# Patient Record
Sex: FEMALE | Race: WHITE
[De-identification: names, ages, dates, MRNs, and addresses within clinical notes are randomized per-mention and may not be internally consistent; named-entity substitution may affect disease eponyms.]

---

## 2021-12-31 ENCOUNTER — HOSPITAL ENCOUNTER (EMERGENCY)
Dept: HOSPITAL 97 - ER | Age: 66
Discharge: LEFT BEFORE DISCHARGE | End: 2021-12-31
Payer: COMMERCIAL

## 2021-12-31 VITALS — DIASTOLIC BLOOD PRESSURE: 58 MMHG | SYSTOLIC BLOOD PRESSURE: 130 MMHG | TEMPERATURE: 98.4 F | OXYGEN SATURATION: 100 %

## 2021-12-31 DIAGNOSIS — Z53.29: ICD-10-CM

## 2021-12-31 DIAGNOSIS — Z20.822: ICD-10-CM

## 2021-12-31 DIAGNOSIS — R11.2: Primary | ICD-10-CM

## 2021-12-31 PROCEDURE — 99282 EMERGENCY DEPT VISIT SF MDM: CPT

## 2021-12-31 NOTE — ER
Nurse's Notes                                                                                     

 [facility]                                                                 

Name: Juli Rushing                                                                             

Age: 66 yrs                                                                                       

Sex: Female                                                                                       

: 1955                                                                                   

MRN: [id number]                                                                                   

Arrival Date: 2021                                                                          

Time: 16:33                                                                                       

Account#: [id number]                                                                            

Bed Waiting                                                                                       

Private MD:                                                                                       

Diagnosis:                                                                                        

                                                                                                  

Presentation:                                                                                     

                                                                                             

16:42 Chief complaint: Patient states: "I want to tested for Covid". Pt reports vomiting for  eo2 

      days, denies vomiting today, reports ongoing nausea, "headache and sinus drainage". Pt      

      gets dialysis T,TH, Sat, reports unvaccinated for COVID. Noted with O2 NC from home.        

      Coronavirus screen: Vaccine status: Patient reports being unvaccinated. Ebola Screen:       

      Patient negative for fever greater than or equal to 101.5 degrees Fahrenheit, and           

      additional compatible Ebola Virus Disease symptoms Patient denies travel to an              

      Ebola-affected area in the 21 days before illness onset. (+) Exposure. Initial Sepsis       

      Screen: Does the patient meet any 2 criteria? No. Patient's initial sepsis screen is        

      negative. Does the patient have a suspected source of infection? No. Patient's initial      

      sepsis screen is negative. Risk Assessment: Do you want to hurt yourself or someone         

      else? Patient reports no desire to harm self or others. Onset of symptoms is unknown.       

16:42 Method Of Arrival: Ambulatory                                                           eo2 

16:42 Acuity: MIRTHA 4                                                                           eo2 

17:01 Note Pt seen by Raymond GARCIA in triage, made aware that COVID results can take a few      eo2 

      days, and that her symptoms cannot be attributed to COVID alone given her health            

      history. Pt advised to wait for further workup in the ER, pt states she does not desire     

      to wait for a long time in the ER, states she wants the COVID swab done and will leave      

      after. Raymond NP aware.                                                                    

                                                                                                  

Triage Assessment:                                                                                

16:49 General: Appears in no apparent distress. Behavior is cooperative.                      eo2 

                                                                                                  

Historical:                                                                                       

- Allergies:                                                                                      

16:47 PENICILLINS;                                                                            eo2 

- PMHx:                                                                                           

16:47 stage 4 kidney failure; dialysis; Hypertensive disorder; high cholesterol;              eo2 

                                                                                                  

- Immunization history:: Adult Immunizations not up to date, Client reports having NOT            

  received the Covid vaccine. Flu vaccine is not up to date.                                      

- Social history:: Smoking status: Patient denies any tobacco usage or history of.                

  Patient/guardian denies using alcohol, street drugs.                                            

                                                                                                  

                                                                                                  

Vital Signs:                                                                                      

16:42  / 58; Pulse 73; Resp 19; Temp 98.4(O); Pulse Ox 100% on 2 lpm NC; Weight 104.33  eo2 

      kg; Height 5 ft. 7 in. (170.18 cm); Pain 9/10;                                              

16:42 Body Mass Index 36.02 (104.33 kg, 170.18 cm)                                            eo2 

16:42 pt reports lower back pain, states I don't know if I have a urinary tract infection     eo2 

                                                                                                  

ED Course:                                                                                        

16:33 Patient arrived in ED.                                                                  mr  

16:44 [facility]                                                        kb  

16:44 Scotty Leavitt MD is Attending Physician.                                                kb  

16:47 Triage completed.                                                                       eo2 

17:01 COVID-19 (Coronavirus) Document "Date of Onset" if Symptomatic Sent.                    eo2 

                                                                                                  

Administered Medications:                                                                         

No medications were administered                                                                  

                                                                                                  

                                                                                                  

Outcome:                                                                                          

17:04 Patient left the ED.                                                                    eo2 

                                                                                                  

Signatures:                                                                                       

Maribel Andrade FNP-C FNP-Ckb Rivera, Mary                                                                                    

Anila Riley, RN                      RN   eo2                                                  

                                                                                                  

**************************************************************************************************

## 2021-12-31 NOTE — XMS REPORT
Continuity of Care Document

                          Created on:2021



Patient:SUZI SEARS

Sex:Female

:1955

External Reference #:[id number]





Demographics







                          Address                   [address] [place] [address] [address]



                                                    [place] [address]

 

                          Home Phone                [phone]

 

                          Work Phone                [phone]

 

                          Email Address             [email or web address]

 

                          Preferred Language        English

 

                          Marital Status            Unknown

 

                          Latter day Affiliation     Unknown

 

                          Race                      Unknown

 

                          Ethnic Group              Unknown









Author







                          Organization              [facility]

t

 

                          Address                   [address]



                                                    [place] [address]

 

                          Phone                     [phone]









Support







                Name            Relationship    Address         Phone

 

                EDUARDO SEARS      SP              [address] [phone]



                                                [place] [address] 

 

                EDUARDO SEARS      FAUSTO              Unavailable     [phone]









Care Team Providers







                    Name                Role                Phone

 

                    DRUMED               Attending Clinician Unavailable

 

                    ANABEL              Attending Clinician Unavailable

 

                    TONO           Attending Clinician Unavailable

 

                    MD ALIYA LAMAR Attending Clinician Unavailable

 

                    LAKHWINDER           Attending Clinician Unavailable

 

                    RADHA              Attending Clinician Unavailable

 

                    ANAIS          Attending Clinician Unavailable

 

                    MD ANAIS      Attending Clinician Unavailable

 

                    MD ANABEL  OBIOMA  Attending Clinician Unavailable

 

                    MD LATISHA POWER Attending Clinician Unavailable

 

                    DANIELLA               Attending Clinician Unavailable

 

                    CHAD              Attending Clinician Unavailable

 

                    DR KEIKO            Attending Clinician Unavailable

 

                    TONO           Admitting Clinician Unavailable

 

                    MD ALIYA LAMAR Admitting Clinician Unavailable

 

                    ANAIS          Admitting Clinician Unavailable

 

                    MD ANAIS      Admitting Clinician Unavailable

 

                    ANABEL              Admitting Clinician Unavailable

 

                    MD JOSEY BECKHAM        Admitting Clinician Unavailable

 

                    MD LATISHA POWER Admitting Clinician Unavailable

 

                    DR KEIKO            Admitting Clinician Unavailable









Problems







       Condition Condition Condition Status Onset  Resolution Last   Treating Co

mments 

Source



       Name   Details Category        Date   Date   Treatment Clinician        



                                                 Date                 

 

       SOLO           Diagnosis Active 2021               Mem

[facility]



                                             12:03:00               l



                SOLO                00:00:                             Barron



                                   00                                 



                                                                      



              Active                                                  



                                                                      



                                                                      



              2021                                                  



                                                                      



                                                                      



                                                                      



                                                                      



                                                                      



                                                                      



                                                                      



                                                                      



                                                                       



              [facility]                                                  



                                                                      



                                                                      

 

       COLON         Diagnosis Active 2017               [facility]

[facility]



       CANCER                                05:49:00               l



       SCREENING-   COLON               00:00:                             [facility]

nn



       Z12.11 CANCER               00                                 



              SCREENING-                                                  



              Z12.11                                                  



                                                                      



                                                                      



              Active                                                  



                                                                      



                                                                      



              2017                                                  



                                                                      



                                                                      



                                                                      



                                                                      



                                                                      



                                                                      



                                                                      



                                                                      



                     [facility]                                                  



              [facility]                                                  



                                                                      



                                                                      

 

       R92.1 -        Diagnosis Active 2016               Me

moria



       MAMMOGRAPH                                15:55:00               l



       IC       R92.1 -               00:01:                             Barron



       CALCIFCN MAMMOGRAPH               00                                 



       FOUND ON IC                                                      



              CALCIFCN                                                  



              FOUND ON                                                  



                                                                      



                                                                      



              Active                                                  



                                                                      



                                                                      



              2016                                                  



                                                                      



                                                                      



                                                                      



                                                                      



                                                                      



                                                                      



                                                                      



                                                                      



                                                                       



              [employer] [facility]                                                  



              [facility]                                                    



                                                                      



                                                                      

 

       Z12.31 -        Diagnosis Active 2016               M

em[facility]



       ENCNTR                      -          07:08:00               l



       SCREEN   Z12.31 -               00:01:                             Donny martinez



       MAMMOGRAM ENCNTR               00                                 



       FOR MA SCREEN                                                  



              MAMMOGRAM                                                  



              FOR MA                                                  



                                                                      



                                                                      



              Active                                                  



                                                                      



                                                                      



              2016                                                  



                                                                      



                                                                      



                                                                      



                                                                      



                                                                      



                                                                      



                                                                      



                                                                      



                                                                       



              [employer]                                                  



              [facility]                                                    



                                                                      



                                                                      

 

       RT WRIST        Diagnosis Active 2017               M

emoria



       DISTAL                      -          02:03:00               l



       RADIUS   RT WRIST               09:00:                             Donny martinez



       CLSD FX DISTAL               00                                 



              RADIUS                                                  



              CLSD FX                                                  



                                                                      



                                                                      



              Active                                                  



                                                                      



                                                                      



              2016                                                  



                                                                      



                                                                      



                                                                      



                                                                      



                                                                      



                                                                      



                                                                      



                                                                      



                     [facility]                                                  



              [place]                                                  



              [facility]                                                  



              [facility]                                                   



                                                                      



                                                                      

 

       Abnormal        Problem Active 2021               Mem

[facility]



       glucose                                22:17:41               l



       level    Abnormal               00:00:                             Donny martinez



       (finding) glucose               00                                 



              level                                                   



              (finding)                                                  



                                                                      



                                                                      



              Active                                                  



                                                                      



                                                                      



              2015                                                  



                                                                      



                                                                      



               Problem                                                  



                                                                      



                                                                      



              2021                                                  



                                                                      



                                                                      



               Data                                                   



              migrated                                                  



              from [employer]                                                  



              on 7/8/15.                                                  



                                                                      



                                                                      



                                                                    



              Medical                                                  



              Group,                                                  



              [facility]                                                    



              [facility],                                                  



              [facility]                                                  



              [place],                                                  



              [facility]                                                     



              [facility]                                                  



              [facility]                                                  



              [place]                                                  



              [facility]                                                  



              [facility],                                                  



              [place]                                                  



                                                                      



                                                                      

 

       Lymphedema        Problem Active 2020               M

em[facility]



       (disorder)                      2-          00:38:51               l



                                   00:00:                             Barron



              Lymphedema               00                                 



              (disorder)                                                  



                                                                      



                                                                      



               Active                                                  



                                                                      



                                                                      



              2014                                                  



                                                                      



                                                                      



               Problem                                                  



                                                                      



                                                                      



              2020                                                  



                                                                      



                                                                      



               Data                                                   



              migrated                                                  



              from [employer]                                                  



              on                                                      



              5/30/15.                                                  



                                                                      



                                                                      



               Medical                                                  



              Group,                                                  



              OPILIZY Zarate,                                                  



              [employer]                                                  



              [place],                                                  



              [facility]                                                     



              [facility]                                                  



              [facility]                                                  



              [place]                                                  



              [facility]                                                  



              [facility],                                                  



              [place]                                                  



                                                                      



                                                                      

 

       Obstructiv        Problem Active 2021               M

[facility]



       e sleep                      2-04          22:17:41               l



       apnea                       00:00:                             [place]



       syndrome Obstructiv               00                                 



       (disorder) e sleep                                                  



              apnea                                                   



              syndrome                                                  



              (disorder)                                                  



                                                                      



                                                                      



               Active                                                  



                                                                      



                                                                      



              2014                                                  



                                                                      



                                                                      



               Problem                                                  



                                                                      



                                                                      



              2021                                                  



                                                                      



                                                                      



               Data                                                   



              migrated                                                  



              from [employer]                                                  



              on                                                      



              5/30/15.                                                  



                                                                      



                                                                      



               Medical                                                  



              Group,                                                  



              OPILIZY Zarate,                                                  



              [employer]                                                  



              [facility],                                                  



              [facility]                                                     



              [facility]                                                  



              [facility]                                                  



              [place]                                                  



              [facility]                                                  



              [facility],                                                  



              [place]                                                  



                                                                      



                                                                      

 

       Hypothyroi        Problem Active 2013-0        2019-10-19               M

em[facility]



       dism                                  21:25:36               l



       (disorder)                      00:00:                             Donny

n



              Hypothyroi               00                                 



              dism                                                    



              (disorder)                                                  



                                                                      



                                                                      



               Active                                                  



                                                                      



                                                                      



              2013                                                  



                                                                      



                                                                      



               Problem                                                  



                                                                      



                                                                      



              10/19/2019                                                  



                                                                      



                                                                      



               Data                                                   



              migrated                                                  



              from [employer]                                                  



              [employer]                                                  



              on                                                      



              5/30/15.                                                  



                                                                      



                                                                      



               Medical                                                  



              Group,[facility]                                                  



              [facility]                                                    



              Tessa,                                                  



              [facility]                                                  



              [facility],                                                  



              [facility]                                                     



              [facility]                                                  



              [facility]                                                  



              [place]                                                  



              [facility]                                                  



              [facility],                                                  



              [place]                                                  



                                                                      



                                                                      

 

       Depressive        Problem Active 2021               [facility]

[facility]



       disorder                      4-24          22:17:41               l



       (disorder)                      00:00:                             Donny

n



              Depressive               00                                 



              disorder                                                  



              (disorder)                                                  



                                                                      



                                                                      



               Active                                                  



                                                                      



                                                                      



              2013                                                  



                                                                      



                                                                      



               Problem                                                  



                                                                      



                                                                      



              2021                                                  



                                                                      



                                                                      



               Data                                                   



              migrated                                                  



              from [employer]                                                  



              [employer]                                                  



              on                                                      



              5/30/15.                                                  



                                                                      



                                                                      



               Medical                                                  



              Group,                                                  



              [facility]                                                    



              [facility],                                                  



              [facility]                                                  



              [facility],                                                  



              [facility]                                                     



              [facility]                                                  



              [facility]                                                  



              [place]                                                  



              [facility]                                                  



              [facility],                                                  



              [place]                                                  



                                                                      



                                                                      

 

       Obesity        Problem Active 2016               [place]

[facility]



       (disorder)                      8-20          00:23:22               l



                Obesity               00:00:                             [place]ron



              (disorder)               00                                 



                                                                      



                                                                      



               Active                                                  



                                                                      



                                                                      



              2012                                                  



                                                                      



                                                                      



               Problem                                                  



                                                                      



                                                                      



              2016                                                  



                                                                      



                                                                      



               Data                                                   



              migrated                                                  



              from [employer]                                                  



              [employer]                                                  



              on                                                      



              5/30/15.                                                  



                                                                      



                                                                      



               [facility]                                                  



              [facility],                                                  



              [facility]                                                  



              [facility],                                                  



              [facility]                                                     



              [facility]                                                  



              [facility]                                                  



              [place]                                                  



              [facility]                                                  



              [facility]                                                   



                                                                      



                                                                      

 

       Cobalamin        Problem Active 2021               [facility]

[facility]



       deficiency                      7-11          22:17:41               l



       (disorder)                      00:00:                             Donny

n



              Cobalamin               00                                 



              deficiency                                                  



              (disorder)                                                  



                                                                      



                                                                      



               Active                                                  



                                                                      



                                                                      



              2012                                                  



                                                                      



                                                                      



               Problem                                                  



                                                                      



                                                                      



              2021                                                  



                                                                      



                                                                      



               Data                                                   



              migrated                                                  



              from [employer]                                                  



              [employer]                                                  



              on                                                      



              5/30/15.                                                  



                                                                      



                                                                      



               Medical                                                  



              [facility]                                                  



              [facility]                                                    



              [facility],                                                  



              [facility]                                                  



              [facility],                                                  



              [facility]                                                     



              [facility]                                                  



              [facility]                                                  



              [place]                                                  



              [facility]                                                  



              [facility],                                                  



              [place]                                                  



                                                                      



                                                                      

 

       Hypertensi        Problem Active 2016               [facility]

[facility]



       ve episode                      7-10          00:23:22               l



       (disorder)                      00:00:                             Donny

n



              Hypertensi               00                                 



              ve episode                                                  



              (disorder)                                                  



                                                                      



                                                                      



               Active                                                  



                                                                      



                                                                      



              07/10/2012                                                  



                                                                      



                                                                      



               Problem                                                  



                                                                      



                                                                      



              2016                                                  



                                                                      



                                                                      



               Data                                                   



              migrated                                                  



              from [employer]                                                  



              [employer]                                                  



              on                                                      



              5/30/15.                                                  



                                                                      



                                                                      



              [facility]                                                  



              [facility],                                                  



              [facility]                                                  



              [facility],                                                  



              [facility]                                                     



              [facility]                                                  



              [facility]                                                  



              [place]                                                  



              [facility]                                                  



              [facility]                                                   



                                                                      



                                                                      

 

       Pain in        Problem                      2016               [place]

[facility]an



       wrist                                     05:02:18               l



       (finding)   Pain in                                                  [facility]

daryl



              wrist                                                   



              (finding)                                                  



                                                                      



                                                                      



                                                                      



                                                                      



                                                                      



                                                                      



                                                                      



              Problem                                                  



                                                                      



                                                                      



              2016                                                  



                                                                      



                                                                      



                                                                      



                                                                      



                                                                      



              [facility]                                                  



              [facility]                                                  



              [facility]                                                    



                                                                      



                                                                      

 

       Calcificat        Problem Resolve               2021               

[facility]



       ion of               d                    22:17:41               l



       breast                                                         [place]



       (finding) Calcificat                                                  



              ion of                                                  



              breast                                                  



              (finding)                                                  



                                                                      



                                                                      



              Resolved                                                  



                                                                      



                                                                      



                                                                      



                                                                      



               Problem                                                  



                                                                      



                                                                      



              2021                                                  



                                                                      



                                                                      



               Left                                                   



                                                                      



                                                                       



              Medical                                                  



              Group,                                                  



              [facility]                                                    



              [facility],                                                  



              [facility]                                                  



              [facility],                                                  



              [facility]                                                     



              [facility]                                                  



              [facility]                                                  



              [place]                                                  



              [facility]                                                  



              [facility],                                                  



              [place]                                                  



                                                                      



                                                                      

 

       Dysuria        Problem Resolve               2021               [facility]

[facility]



       (finding)               d                    22:17:41               l



                Dysuria                                                  [place]



              (finding)                                                  



                                                                      



                                                                      



              Resolved                                                  



                                                                      



                                                                      



                                                                      



                                                                      



               Problem                                                  



                                                                      



                                                                      



              2021                                                  



                                                                      



                                                                      



                                                                      



                                                                      



                                                                    



              Medical                                                  



              Group,                                                  



              [facility]                                                  



              [facility],                                                  



              [place]                                                  



                                                                      



                                                                      

 

       Acute         Problem Active               2020               [facility]

ia



       urinary                                    22:36:35               l



       tract    Acute                                                  [place]



       infection urinary                                                  



       (disorder) tract                                                   



              infection                                                  



              (disorder)                                                  



                                                                      



                                                                      



               Active                                                  



                                                                      



                                                                      



                                                                      



                                                                      



              Problem                                                  



                                                                      



                                                                      



              2020                                                  



                                                                      



                                                                      



                                                                      



                                                                      



                                                                    



              Medical                                                  



              Group                                                   



                                                                      



                                                                      

 

       Chronic        Problem Active               2020               [place]

milan



       renal                                     22:36:35               l



       impairment   Chronic                                                  Her

[place]



       (disorder) renal                                                   



              impairment                                                  



              (disorder)                                                  



                                                                      



                                                                      



               Active                                                  



                                                                      



                                                                      



                                                                      



                                                                      



              Problem                                                  



                                                                      



                                                                      



              2020                                                  



                                                                      



                                                                      



                                                                      



                                                                      



                                                                    



              Medical                                                  



              Group,                                                  



              OPIL[facility],                                                  



              [facility]                                                  



              [facility],                                                  



              [facility]                                                     



              [facility]                                                  



              [facility]                                                  



              [place]                                                  



              [facility]                                                  



              [facility],                                                  



              [place]                                                  



                                                                      



                                                                      

 

       Diabetes        Problem Active               2020               [facility]

[facility]



       mellitus                                    23:27:54               l



       (disorder)   Diabetes                                                  He

rmann



              mellitus                                                  



              (disorder)                                                  



                                                                      



                                                                      



               Active                                                  



                                                                      



                                                                      



                                                                      



                                                                      



              Problem                                                  



                                                                      



                                                                      



              2020                                                  



                                                                      



                                                                      



                                                                      



                                                                      



                                                                    



              Medical                                                  



              Group,                                                  



              [facility]                                                  



              [place]                                                    



                                                                      



                                                                      

 

       Urinalysis        Problem Active               2021               [facility]

[facility]



       = abnormal                                    22:17:41               l



       (finding)                                                         [place]



              Urinalysis                                                  



              = abnormal                                                  



              (finding)                                                  



                                                                      



                                                                      



              Active                                                  



                                                                      



                                                                      



                                                                      



                                                                      



              Problem                                                  



                                                                      



                                                                      



              2021                                                  



                                                                      



                                                                      



                                                                      



                                                                      



                                                                    



              Medical                                                  



              Group,                                                  



              [facility]                                                  



              [place],                                                  



              [place]                                                  



                                                                      



                                                                      

 

       Atrial        Problem Active               2021               [facility]

ia



       fibrillati                                    22:17:41               l



       on       Atrial                                                  [place]



       (disorder) fibrillati                                                  



              on                                                      



              (disorder)                                                  



                                                                      



                                                                      



               Active                                                  



                                                                      



                                                                      



                                                                      



                                                                      



              Problem                                                  



                                                                      



                                                                      



              2021                                                  



                                                                      



                                                                      



                                                                      



                                                                      



                                                                    



              Medical                                                  



              Group,                                                  



              [facility]                                                  



              [facility],                                                  



              [place]                                                  



                                                                      



                                                                      

 

       Benign        Problem Active               2021               [facility]

ia



       essential                                    22:17:41               l



       hypertensi   Benign                                                  [facility]

daryl



       on     essential                                                  



       (disorder) hypertensi                                                  



              on                                                      



              (disorder)                                                  



                                                                      



                                                                      



               Active                                                  



                                                                      



                                                                      



                                                                      



                                                                      



              Problem                                                  



                                                                      



                                                                      



              2021                                                  



                                                                      



                                                                      



                                                                      



                                                                      



                                                                    



              Medical                                                  



              Group,                                                  



              [facility]                                                    



              [facility],                                                  



              [employer]                                                  



              [place],                                                  



              [facility]                                                     



              [place]                                                  



              [facility]                                                  



              [place]                                                  



              [facility]                                                  



              [facility],                                                  



              [place]                                                  



                                                                      



                                                                      

 

       Cardiorena        Problem Active               2021               [facility]

[facility]



       l syndrome                                    22:17:41               l



       (disorder)                                                         [place]

n



              Cardiorena                                                  



              l syndrome                                                  



              (disorder)                                                  



                                                                      



                                                                      



               Active                                                  



                                                                      



                                                                      



                                                                      



                                                                      



              Problem                                                  



                                                                      



                                                                      



              2021                                                  



                                                                      



                                                                      



                                                                      



                                                                      



                                                                    



              Medical                                                  



              Group,                                                  



              [facility] [employer]                                                  



              [facility],                                                  



              [place]                                                  



                                                                      



                                                                      

 

       Chronic        Problem Active               2021               [place]

milan



       kidney                                    22:17:41               l



       disease   Chronic                                                  Donny

n



       (disorder) kidney                                                  



              disease                                                  



              (disorder)                                                  



                                                                      



                                                                      



               Active                                                  



                                                                      



                                                                      



                                                                      



                                                                      



              Problem                                                  



                                                                      



                                                                      



              2021                                                  



                                                                      



                                                                      



                                                                      



                                                                      



                                                                    



              Medical                                                  



              Group,                                                  



              [employer]                                                  



              [facility],                                                  



              [place]                                                  



                                                                      



                                                                      

 

       Chronic        Problem Active               2021               [place]

milan



       kidney                                    22:17:41               l



       disease   Chronic                                                  Donny

n



       stage 3 kidney                                                  



       (disorder) disease                                                  



              stage 3                                                  



              (disorder)                                                  



                                                                      



                                                                      



               Active                                                  



                                                                      



                                                                      



                                                                      



                                                                      



              Problem                                                  



                                                                      



                                                                      



              2021                                                  



                                                                      



                                                                      



                                                                      



                                                                      



                                                                    



              Medical                                                  



              Group,                                                  



              [employer] [employer]                                                  



              [place],                                                  



              [place]                                                  



                                                                      



                                                                      

 

       Chronic        Problem Active               2021               [place]

milan



       pain                                      22:17:41               l



       (finding)   Chronic                                                  Herm

daryl



              pain                                                    



              (finding)                                                  



                                                                      



                                                                      



              Active                                                  



                                                                      



                                                                      



                                                                      



                                                                      



              Problem                                                  



                                                                      



                                                                      



              2021                                                  



                                                                      



                                                                      



                                                                      



                                                                      



                                                                    



              Medical                                                  



              Group,                                                  



              [employer] [employer]                                                  



              [place],                                                  



              [place]                                                  



                                                                      



                                                                      

 

       Dependence        Problem Active               2021               [facility]

[facility]



       on                                        22:17:41               l



       supplement                                                         [place]

n



       al oxygen [place]                                                  



       (finding) on                                                      



              supplement                                                  



              al oxygen                                                  



              (finding)                                                  



                                                                      



                                                                      



              Active                                                  



                                                                      



                                                                      



                                                                      



                                                                      



              Problem                                                  



                                                                      



                                                                      



              2021                                                  



                                                                      



                                                                      



                                                                      



                                                                      



                                                                    



              Medical                                                  



              Group,                                                  



              [place]                                                  



                                                                      



                                                                      

 

       Edema of        Problem Active               2021               [facility]

[facility]



       lower                                     22:17:41               l



       extremity   Edema of                                                  Her

[place]



       (finding) lower                                                   



              extremity                                                  



              (finding)                                                  



                                                                      



                                                                      



              Active                                                  



                                                                      



                                                                      



                                                                      



                                                                      



              Problem                                                  



                                                                      



                                                                      



              2021                                                  



                                                                      



                                                                      



                                                                      



                                                                      



                                                                    



              Medical                                                  



              Group,                                                  



              [facility] [employer]                                                  



              [place],                                                  



              [place]                                                  



                                                                      



                                                                      

 

       Hyperlipid        Problem Active               2021               [facility]

[facility]



       emia                                      22:17:41               l



       (disorder)                                                         [place]

n



              Hyperlipid                                                  



              emia                                                    



              (disorder)                                                  



                                                                      



                                                                      



               Active                                                  



                                                                      



                                                                      



                                                                      



                                                                      



              Problem                                                  



                                                                      



                                                                      



              2021                                                  



                                                                      



                                                                      



               Data                                                   



              migrated                                                  



              from [facility]                                                  



              on                                                      



              5/30/15.                                                  



                                                                      



                                                                      



               Medical                                                  



              Group,                                                  



              [facility],                                                  



              [facility]                                                  



              [facility],                                                  



              [facility]                                                     



              [facility]                                                  



              [facility]                                                  



              [facility]                                                  



              Bone &                                                  



              Joint,                                                  



              [place]                                                  



                                                                      



                                                                      

 

       Hyperparat        Problem Active               2021               M

[facility]



       hyroidism                                    22:17:41               l



       (disorder)                                                         Donny

n



              Hyperparat                                                  



              hyroidism                                                  



              (disorder)                                                  



                                                                      



                                                                      



               Active                                                  



                                                                      



                                                                      



                                                                      



                                                                      



              Problem                                                  



                                                                      



                                                                      



              2021                                                  



                                                                      



                                                                      



                                                                      



                                                                      



                                                                    



              Medical                                                  



              Group,                                                  



              [facility]                                                  



              Land,                                                  



              [place]                                                  



                                                                      



                                                                      

 

       Hypertensi        Problem Active               2021               M

[facility]



       ve                                        22:17:41               l



       disorder,                                                         [place]



       systemic Hypertensi                                                  



       arterial ve                                                      



       (disorder) disorder,                                                  



              systemic                                                  



              arterial                                                  



              (disorder)                                                  



                                                                      



                                                                      



               Active                                                  



                                                                      



                                                                      



                                                                      



                                                                      



              Problem                                                  



                                                                      



                                                                      



              2021                                                  



                                                                      



                                                                      



                                                                      



                                                                      



                                                                    



              Medical                                                  



              [facility]                                                    



              [facility]                                                  



              [facility]                                                  



              [facility]                                                  



              Land,                                                  



              [facility]                                                  



              Land,                                                  



              [place]                                                  



                                                                      



                                                                      

 

       Hypertensi        Problem Active               2021               M

[facility]



       ve renal                                    22:17:41               l



       disease                                                         [place]



       (disorder) Hypertensi                                                  



              ve renal                                                  



              disease                                                  



              (disorder)                                                  



                                                                      



                                                                      



               Active                                                  



                                                                      



                                                                      



                                                                      



                                                                      



              Problem                                                  



                                                                      



                                                                      



              2021                                                  



                                                                      



                                                                      



                                                                      



                                                                      



                                                                    



              Medical                                                  



              Group,                                                  



              [facility]                                                  



              Land,                                                  



              [place]                                                  



                                                                      



                                                                      

 

       Hyperurice        Problem Active               2021               M

[facility]



       keegan                                       22:17:41               l



       (disorder)                                                         Donny

n



              Hyperurice                                                  



              keegan                                                     



              (disorder)                                                  



                                                                      



                                                                      



               Active                                                  



                                                                      



                                                                      



                                                                      



                                                                      



              Problem                                                  



                                                                      



                                                                      



              2021                                                  



                                                                      



                                                                      



               Data                                                   



              migrated                                                  



              from [facility]                                                  



              on                                                      



              5/30/15.                                                  



                                                                      



                                                                      



               Medical                                                  



              Group,                                                  



              [facility]                                                    



              [facility],                                                  



              [facility]                                                  



              Land,                                                  



              [facility]                                                     



              [facility]                                                  



              [facility]                                                  



              [place]                                                  



              [facility]one &                                                  



              Joint,                                                  



              [place]                                                  



                                                                      



                                                                      

 

       Lymphedema        Problem Active               2021               M

[facility]



       of lower                                    22:17:41               l



       extremity                                                         [place]



       (disorder) Lymphedema                                                  



              of lower                                                  



              extremity                                                  



              (disorder)                                                  



                                                                      



                                                                      



               Active                                                  



                                                                      



                                                                      



                                                                      



                                                                      



              Problem                                                  



                                                                      



                                                                      



              2021                                                  



                                                                      



                                                                      



                                                                      



                                                                      



                                                                    



              Medical                                                  



              Group,                                                  



              [facility]                                                  



              Land,                                                  



              [place]                                                  



                                                                      



                                                                      

 

       Malignant        Problem Active               2021               Me

moria



       neoplasm                                    22:17:41               l



       of skin of                                                         [place]

n



       upper limb Malignant                                                  



       (disorder) neoplasm                                                  



              of skin of                                                  



              upper limb                                                  



              (disorder)                                                  



                                                                      



                                                                      



               Active                                                  



                                                                      



                                                                      



                                                                      



                                                                      



              Problem                                                  



                                                                      



                                                                      



              2021                                                  



                                                                      



                                                                      



                                                                      



                                                                      



                                                                    



              Medical                                                  



              Group,                                                  



              [facility]                                                  



              Land,                                                  



              [place]                                                  



                                                                      



                                                                      

 

       Morbid        Problem Active               2021               [facility]

ia



       obesity                                    22:17:41               l



       (disorder)   Morbid                                                  Herm

daryl



              obesity                                                  



              (disorder)                                                  



                                                                      



                                                                      



               Active                                                  



                                                                      



                                                                      



                                                                      



                                                                      



              Problem                                                  



                                                                      



                                                                      



              2021                                                  



                                                                      



                                                                      



                                                                      



                                                                      



                                                                    



              Medical                                                  



              Group,                                                  



              [facility]                                                    



              [facility],                                                  



              [facility]                                                  



              Land,                                                  



              [facility]                                                     



              [facility]                                                  



              [facility]                                                  



              [place]                                                  



              Bone &                                                  



              Joint,                                                  



              [place]                                                  



                                                                      



                                                                      

 

       Post-disch        Problem Active               2021               M

[facility]



       arge                                      22:17:41               l



       follow-up                                                         [place]



       (finding) Post-disch                                                  



              arge                                                    



              follow-up                                                  



              (finding)                                                  



                                                                      



                                                                      



              Active                                                  



                                                                      



                                                                      



                                                                      



                                                                      



              Problem                                                  



                                                                      



                                                                      



              2021                                                  



                                                                      



                                                                      



                                                                      



                                                                      



                                                                    



              Medical                                                  



              Group,                                                  



              [place]                                                  



                                                                      



                                                                      

 

       Prerenal        Problem Active               2021               Mem

oria



       azotemia                                    22:17:41               l



       (disorder)   Prerenal                                                  He

rmann



              azotemia                                                  



              (disorder)                                                  



                                                                      



                                                                      



               Active                                                  



                                                                      



                                                                      



                                                                      



                                                                      



              Problem                                                  



                                                                      



                                                                      



              2021                                                  



                                                                      



                                                                      



                                                                      



                                                                      



                                                                    



              Medical                                                  



              Group,                                                  



              [facility]                                                  



              Land,                                                  



              [place]                                                  



                                                                      



                                                                      

 

       Proteinuri        Problem Active               2021               M

[facility]



       a                                         22:17:41               l



       (finding)                                                         [place]



              Proteinuri                                                  



              a                                                       



              (finding)                                                  



                                                                      



                                                                      



              Active                                                  



                                                                      



                                                                      



                                                                      



                                                                      



              Problem                                                  



                                                                      



                                                                      



              2021                                                  



                                                                      



                                                                      



                                                                      



                                                                      



                                                                    



              Medical                                                  



              Group,                                                  



              [facility]                                                  



              [place],                                                  



              [place]                                                  



                                                                      



                                                                      

 

       Stasis        Problem Active               2021               [facility]

ia



       ulcer                                     22:17:41               l



       (disorder)   Stasis                                                  [facility]

daryl



              ulcer                                                   



              (disorder)                                                  



                                                                      



                                                                      



               Active                                                  



                                                                      



                                                                      



                                                                      



                                                                      



              Problem                                                  



                                                                      



                                                                      



              2021                                                  



                                                                      



                                                                      



                                                                      



                                                                      



                                                                    



              Medical                                                  



              Group,                                                  



              [facility]                                                  



              [place],                                                  



              [place]                                                  



                                                                      



                                                                      

 

       Swelling -        Problem Active               2021               [facility]

[facility]



       edema -                                    22:17:41               l



       symptom   Swelling                                                  [place]

nn



       (finding) - edema -                                                  



              symptom                                                  



              (finding)                                                  



                                                                      



                                                                      



              Active                                                  



                                                                      



                                                                      



                                                                      



                                                                      



              Problem                                                  



                                                                      



                                                                      



              2021                                                  



                                                                      



                                                                      



                                                                      



                                                                      



                                                                    



              Medical                                                  



              Group,                                                  



              [facility]                                                  



              [place],                                                  



              [place]                                                  



                                                                      



                                                                      

 

       Swollen        Problem Active               2021               [place]

[place]an



       ankle                                     22:17:41               l



       (finding)   Swollen                                                  [facility]

daryl



              ankle                                                   



              (finding)                                                  



                                                                      



                                                                      



              Active                                                  



                                                                      



                                                                      



                                                                      



                                                                      



              Problem                                                  



                                                                      



                                                                      



              2021                                                  



                                                                      



                                                                      



                                                                      



                                                                      



                                                                    



              Medical                                                  



              Group,                                                  



              [facility]                                                  



              [place],                                                  



              [place]                                                  



                                                                      



                                                                      

 

       Urinary        Problem Resolve               2021               [facility]

[facility]



       tract                d                    22:17:41               l



       infectious   Urinary                                                  [facility]

[place]



       disease tract                                                   



       (disorder) infectious                                                  



              disease                                                  



              (disorder)                                                  



                                                                      



                                                                      



               Resolved                                                  



                                                                      



                                                                      



                                                                      



                                                                      



                Problem                                                  



                                                                      



                                                                      



              2021                                                  



                                                                      



                                                                      



                                                                      



                                                                      



                                                                    



              Medical                                                  



              Group,                                                  



              [facility]                                                  



              [place],                                                  



              [place]                                                  



                                                                      



                                                                      

 

       Venous        Problem Active               2021               [facility]

ia



       ulcer of                                    22:17:41               l



       leg      Venous                                                  [place]



       (disorder) ulcer of                                                  



              leg                                                     



              (disorder)                                                  



                                                                      



                                                                      



               Active                                                  



                                                                      



                                                                      



                                                                      



                                                                      



              Problem                                                  



                                                                      



                                                                      



              2021                                                  



                                                                      



                                                                      



                                                                      



                                                                      



                                                                    



              Medical                                                  



              Group,                                                  



              [facility]                                                  



              [place],                                                  



              [place]                                                  



                                                                      



                                                                      

 

       Weight        Problem Active               2021               [facility]

ia



       gain                                      22:17:41               l



       finding   Weight                                                  [place]



       (finding) gain                                                    



              finding                                                  



              (finding)                                                  



                                                                      



                                                                      



              Active                                                  



                                                                      



                                                                      



                                                                      



                                                                      



              Problem                                                  



                                                                      



                                                                      



              2021                                                  



                                                                      



                                                                      



                                                                      



                                                                      



                                                                    



              Medical                                                  



              Group,                                                  



              [facility]                                                  



              [place],                                                  



              [place]                                                  



                                                                      



                                                                      

 

       Acute         Problem Active               2016               [facility]

ia



       renal                                     00:23:22               l



       failure   Acute                                                  [place]



       syndrome renal                                                   



       (disorder) failure                                                  



              syndrome                                                  



              (disorder)                                                  



                                                                      



                                                                      



               Active                                                  



                                                                      



                                                                      



                                                                      



                                                                      



              Problem                                                  



                                                                      



                                                                      



              2016                                                  



                                                                      



                                                                      



               Data                                                   



              migrated                                                  



              from [facility]                                                  



              on                                                      



              5/30/15.                                                  



                                                                      



                                                                      



               [facility]                                                  



              [facility],                                                  



              [facility]                                                  



              [place],[facility]                                                     



              [place]                                                  



              [facility]                                                  



              [place]                                                  



              [facility]                                                  



              [facility]                                                   



                                                                      



                                                                      

 

       Kidney        Problem Active               2017               [facility]

ia



       disease                                    03:07:28               l



       (disorder)   Kidney                                                  [facility]

daryl



              disease                                                  



              (disorder)                                                  



                                                                      



                                                                      



               Active                                                  



                                                                      



                                                                      



                                                                      



                                                                      



              Problem                                                  



                                                                      



                                                                      



              2017                                                  



                                                                      



                                                                      



                                                                      



                                                                      



                                                                    



              [place]                                                  



                                                                      



                                                                      

 

       Migraine        Problem Active               2017               [facility]

[facility]



       (disorder)                                    03:07:28               l



                Migraine                                                  [place]

n



              (disorder)                                                  



                                                                      



                                                                      



               Active                                                  



                                                                      



                                                                      



                                                                      



                                                                      



              Problem                                                  



                                                                      



                                                                      



              2017                                                  



                                                                      



                                                                      



                                                                      



                                                                      



                                                                      



              [facility]                                                  



              [facility]                                                  



              [facility]                                                  



              [facility] [place],                                                  



              [place]                                                  



                                                                      



                                                                      

 

       RIGHT         Diagnosis Active               2016               [facility]

[facility]



       WRIST                                     15:06:00               l



       DISTAL   RIGHT                                                  [place]



       RADIUS WRIST                                                   



       CLSD FX DISTAL                                                  



              RADIUS                                                  



              CLSD FX                                                  



                                                                      



                                                                      



              Active                                                  



                                                                      



                                                                      



                                                                      



                                                                      



                                                                      



                                                                      



                                                                      



                                                                      



                                                                      



                                                                      



                   [facility]                                                  



              [facility]                                                  



              [facility]                                                   



                                                                      



                                                                      

 

       DISTAL        Diagnosis Active               2016               [facility]

[facility]



       RADIUS                                    09:46:00               l



       CLSD FX   DISTAL                                                  [place]



              RADIUS                                                  



              CLSD FX                                                  



                                                                      



                                                                      



              Active                                                  



                                                                      



                                                                      



                                                                      



                                                                      



                                                                      



                                                                      



                                                                      



                                                                      



                                                                      



                                                                      



                                                                     



              [facility]                                                     



              [place]                                                  



              Trace                                                   



                                                                      



                                                                      

 

       Cholestero        Problem                      2016               [facility]

[facility]



       l                                         05:02:18               l



       (substance                                                         Don[place]

n



       )      Cholestero                                                  



              l                                                       



              (substance                                                  



              )                                                       



                                                                      



                                                                      



                                                                      



                                                                      



                                                                      



                                                                      



              Problem                                                  



                                                                      



                                                                      



              2016                                                  



                                                                      



                                                                      



                                                                      



                                                                      



                                                                      



              [facility]                                                  



              [facility]                                                  



              [facility]                                                  



               [facility]                                                  



              [facility]                                                    



                                                                      



                                                                      

 

       Sleep         Problem                      2016               [facility]

ia



       apnea                                     05:02:18               l



       (finding)   Sleep                                                  [facility]

n



              apnea                                                   



              (finding)                                                  



                                                                      



                                                                      



                                                                      



                                                                      



                                                                      



                                                                      



                                                                      



              Problem                                                  



                                                                      



                                                                      



              2016                                                  



                                                                      



                                                                      



               2does not                                                  



              use cpap                                                  



                                                                      



                                                                      



              Surgical                                                  



              Specialty                                                  



              [facility]                                                  



              [facility]                                                    



                                                                      



                                                                      

 

       Dependence        Problem        2021            

   [facility]



       on                             01:29:16 01:29:16               l



       supplement                      21:17:                             [facility]

n



       al oxygen Dependence               00                                 



              on                                                      



              supplement                                                  



              al oxygen                                                  



                                                                      



                                                                      



                                                                      



                                                                      



                                                                      



              2021                                                  



                                                                      



                                                                      



                                                                      



                                                                      



                                                                    



              Medical                                                  



              Group                                                   



                                                                      



                                                                      

 

       Other         Problem        2021               [facility]

[facility]



       hyperlipid                         01:29:16 01:29:16               l



       emia     Other               21:16:                             [facility]



              hyperlipid               00                                 



              emia                                                    



                                                                      



                                                                      



                                                                      



                                                                      



              2021                                                  



                                                                      



                                                                      



                                                                      



                                                                      



                                                                    



              Medical                                                  



              Group                                                   



                                                                      



                                                                      

 

       Unsteadine        Problem        2021            

   [facility]



       ss on feet                         01:29:16 01:29:16               l



                                   21:13:                             [facility]



              Unsteadine               00                                 



              ss on feet                                                  



                                                                      



                                                                      



                                                                      



                                                                      



                                                                      



              2021                                                  



                                                                      



                                                                      



                                                                      



                                                                      



                                                                    



              Medical                                                  



              Group                                                   



                                                                      



                                                                      

 

       Weakness        Problem        2021              

 [facility]



                                      01:29:16 01:29:16               l



                Weakness               21:13:                             [facility]

n



                                   00                                 



                                                                      



                                                                      



                                                                      



                                                                      



              2021                                                  



                                                                      



                                                                      



                                                                      



                                                                      



                                                                    



              Medical                                                  



              Group                                                   



                                                                      



                                                                      

 

       Essential        Problem        2021             

  [facility]



       (primary)                         01:29:16 01:29:16               l



       hypertensi                      21:12:                             [facility]

n



       on     Essential               00                                 



              (primary)                                                  



              hypertensi                                                  



              on                                                      



                                                                      



                                                                      



                                                                      



                                                                      



              2021                                                  



                                                                      



                                                                      



                                                                      



                                                                      



                                                                    



              Medical                                                  



              Group                                                   



                                                                      



                                                                      

 

       Other long        Problem        2021            

   [facility]



       term                           22:39:43 22:39:43               l



       (current)   Other               22:29:                             [facility]

n



       drug   long term               00                                 



       therapy (current)                                                  



              drug                                                    



              therapy                                                  



                                                                      



                                                                      



                                                                      



                                                                      



              2021                                                  



                                                                      



                                                                      



                                                                      



                                                                      



                                                                    



              Medical                                                  



              Group                                                   



                                                                      



                                                                      

 

       Other         Problem        2021               SUSIE gonzalez



       abnormal                         22:39:43 22:39:43               l



       glucose   Other               22:23:                             [facility]



              abnormal               00                                 



              glucose                                                  



                                                                      



                                                                      



                                                                      



                                                                      



              2021                                                  



                                                                      



                                                                      



                                                                      



                                                                      



                                                                    



              Medical                                                  



              Group                                                   



                                                                      



                                                                      

 

       Acute         Problem Resolve 2016               

[facility]



       otitis               d      -24   00:23:22 00:23:22               l



       media    Acute               00:00:                             [facility]



       (disorder) otitis               00                                 



              media                                                   



              (disorder)                                                  



                                                                      



                                                                      



               Resolved                                                  



                                                                      



                                                                      



              2013                                                  



                                                                      



                                                                      



               Problem                                                  



                                                                      



                                                                      



              2016                                                  



                                                                      



                                                                      



               Data                                                   



              migrated                                                  



              from                                                   



              [employer]                                                  



              on                                                      



              7/18/15.                                                  



                                                                      



                                                                      



               ILA Zarate,                                                  



              OPILIZY [facility]                                                  



              [place],[facility]                                                     



              [place]                                                  



              [facility]                                                  



              Bone &                                                  



              Joint                                                   



                                                                      



                                                                      







Allergies, Adverse Reactions, Alerts







       Allergy Allergy Status Severity Reaction(s) Onset  Inactive Treating Comm

ents 

Source



       Name   Type                        Date   Date   Clinician        

 

       penicill penicill Active                                           [facility]

a



       ins<sup> ins<sup>                                                  l



       1</sup> 1</sup>                                                  [facility]

 

       codeine< codeine< Active                                           [facility]

a



       sup>1</s sup>1</s                                                  l



       up>    up>                                                     [facility]

 

       cefepime cefepime Active                                           [facility]

a



                                                                      l



                                                                      [facility]

 

       Levaquin Levaquin Active                                           [facility]

a



                                                                      l



                                                                      [facility]

 

       penicill penicill Active                                           [facility]

a



       ins<sup> ins<sup>                                                  l



       2</sup> 2</sup>                                                  [facility]

 

       codeine codeine Active                                           [facility]



                                                                      l



                                                                      [facility]

 

       penicill penicill Active                                           [facility]

a



       ins    ins                                                     l



                                                                      [facility]







Social History







           Social Habit Start Date Stop Date  Quantity   Comments   Source

 

           Social History 2019-10-25 2019-10-25                       [facility] KAREN nguyen



                      14:54:48   14:54:48                         









                Smoking Status  Start Date      Stop Date       Source

 

                Social History                                  [facility]







Medications







       Ordered Filled Start  Stop   Current Ordering Indication Dosage Frequency

 Signature

                    Comments            Components          Source



     Medication Medication Date Date Medication? Clinician                (SIG) 

          



     Name Name                                                   

 

     Mupirocin      2021      Yes                      1 appl,           [facility]

ia



     0.02 MG/MG      1-22                               TOP, TID,           l



     Topical      23:21:                               X 5 day, #           [facility]

[facility]



     Ointment      00                                 22 gm, 0           



                                                  Refill(s),           



                                                  Pharmacy:           



                                                  [facility]           



                                                  [facility]           



                                                  #[id number],           



                                                  165.1, cm,           



                                                  21           



                                                  14:08:00           



                                                  CST,           



                                                  Height,           



                                                  136.42,           



                                                  kg,            



                                                  21           



                                                  14:08:00           



                                                  CST,           



                                                  Weight           

 

     bumetanide      2021      Yes                      2 mg = 1           [facility]

[facility]



     2 mg oral      1-19                               tab, PO,           l



     tablet      21:11:                               Daily, #           [facility]



               00                                 30 tab, 0           



                                                  Refill(s)           

 

     allopurinol      2021      Yes                      100 mg = 1           

[facility]



     100 mg oral      1-19                               tab, PO,           l



     tablet      21:10:                               BID, # 60           [facility]

n



               00                                 tab, 0           



                                                  Refill(s)           

 

     gabapentin      2021      Yes                      200 mg = 2           [facility]

[facility]



     100 MG Oral      1-19                               cap, PO,           l



     Capsule      21:10:                               Bedtime, 0           [facility]

[facility]



               00                                 Refill(s)           

 

     QUEtiapine            Yes                      1 tablet,           Me

[facility]



     50 mg oral      3-30                               once a           l



     tablet      13:55:                               day, 0           [facility]



               00                                 Refill(s)           

 

     torsemide            Yes                      1 tablet,           [facility]

[facility]



     20 mg oral      3-30                               twice a           l



     tablet      13:55:                               day, 0           [facility]



               00                                 Refill(s)           

 

     potassium            Yes                      1 tablet,           [facility]

[facility]



     chloride 20      3-30                               once a           l



     mEq oral      13:54:                               day, 0           [facility]



     tablet,      00                                 Refill(s)           



     extended                                                        



     release                                                        



     (KCL)                                                        

 

     allopurinol            Yes                      1/2            [facility]

a



     300 mg oral      3-30                               tablet,           l



     tablet      13:53:                               once a           [facility]



               00                                 day, 0           



                                                  Refill(s)           

 

     carvedilol            Yes                      1 tablet,           [facility]

[facility]



     3.125 mg      3-30                               twice a           l



     oral tablet      13:53:                               day, 0           [facility]

d[facility]



               00                                 Refill(s)           

 

     Digoxin            Yes                      1 tablet,           [facility]

ia



     0.125 MG      3-30                               once a           l



     Oral Tablet      13:53:                               day, 0           [facility]

daryl



               00                                 Refill(s)           

 

     DULoxetine            Yes                      1 capsule,           M

[facility]



     60 mg oral      3-30                               once a           l



     delayed      13:53:                               day, 0           [facility]



     release      00                                 Refill(s)           



     capsule                                                        

 

     apixaban 5            Yes                      1 tablet,           Me

[facility]



     MG Oral      3-30                               twice a           l



     Tablet      13:53:                               day, 0           [facility]



     [Eliquis]      00                                 Refill(s)           

 

     gabapentin            Yes                      1 capsule,           M

[facility]



     300 MG Oral      3-30                               twice a           l



     Capsule      13:53:                               day, 0           [facility]



               00                                 Refill(s)           

 

     metoprolol            Yes                      1 tablet,           Me

[facility]



     tartrate 50      3-30                               Twice a           l



     mg oral      13:53:                               day, 0           [facility]



     tablet      00                                 Refill(s)           

 

     midodrine 5            Yes                      1 tablet,           M

[facility]



     mg oral      3-30                               twice a           l



     tablet      13:53:                               day, 0           [facility]



               00                                 Refill(s)           

 

     DULoxetine      2020      Yes                      60 mg = 1           Me

[facility]



     60 mg oral      1-25                               cap, PO,           l



     delayed      17:17:                               Daily, #           [facility]

n



     release      00                                 30 cap, 0           



     capsule                                         Refill(s)           

 

     Spironolact      2020      Yes                      25 mg = 1           M

[facility]



     one 25 MG      1-25                               tab, PO,           l



     Oral Tablet      17:17:                               Daily, 0           [facility]

rmann



     [Aldactone]      00                                 Refill(s)           

 

     Digoxin      2020      Yes                      0.125 mg,           [facility]

ia



     0.125 MG      1-25                               PO, Daily,           l



     Oral Tablet      17:13:                               # 30 tab,           H

[facility]



               00                                 0              



                                                  Refill(s)           

 

     Mupirocin      2020      Yes                      See            [facility]



     0.02 MG/MG      0-13                               Instructio           l



     Topical      15:44:                               ns, APPLY           [facility]

nn



     Ointment      00                                 EXTERNALLY           



                                                  TO THE           



                                                  AFFECTED           



                                                  AREA TWICE           



                                                  DAILY, #           



                                                  66 gm, 1           



                                                  Refill(s),           



                                                  Pharmacy:           



                                                  [facility]           



                                                  [facility]           



                                                  #[id number],           



                                                  170.18,           



                                                  cm,            



                                                  20           



                                                  14:42:00           



                                                  CST,           



                                                  Height,           



                                                  164.318,           



                                                  kg,            



                                                  20           



                                                  14:42:00           



                                                  CST,           



                                                  Weight           

 

     Nitrofurant            Yes                      100 mg = 1           

[facility]



     oin 100 MG      8-09                               cap, PO,           l



     Oral      17:57:                               BID, X 10           [facility]



     Capsule      00                                 day, # 20           



     [Macrobid]                                         cap, 0           



                                                  Refill(s),           



                                                  Pharmacy:           



                                                  [facility]           



                                                  #[id number],           



                                                  170.18,           



                                                  cm,            



                                                  20           



                                                  14:42:00           



                                                  CST,           



                                                  Height,           



                                                  164.318,           



                                                  kg,            



                                                  20           



                                                  14:42:00           



                                                  CST,           



                                                  Weight           

 

     lisinopril            Yes                      2.5 mg = 1           [facility]

[facility]



     2.5 mg oral      6-04                               tab, PO,           l



     tablet      23:26:                               Daily, #           [facility]



               00                                 30 tab, 2           



                                                  Refill(s),           



                                                  called to           



                                                  pharmacy           

 

     calcitriol      -0      Yes                      = 1 cap,           [facility]

[facility]



     0.25 mcg      5-20                               PO, Daily,           l



     oral      12:18:                               # 90 cap,           [facility]



     capsule      00                                 1              



                                                  Refill(s),           



                                                  Pharmacy:           



                                                  [facility]           



                                                  #[id number]           

 

     calcitriol      2020-0      Yes                      = 1 cap,           [facility]

[facility]



     0.25 mcg      4-16                               PO, Daily,           l



     oral      16:37:                               # 90           [facility]



     capsule      47                                 unknown           



                                                  unit,           



                                                  Pharmacy:           



                                                  [facility]           



                                                  #[id number]           

 

     Furosemide      -0      Yes                      = 1 tab,           [facility]

[facility]



     40 MG Oral      4-13                               PO, Every           l



     Tablet      20:06:                               Other Day,           [facility]

nn



               19                                 # 45 tab,           



                                                  Pharmacy:           



                                                  [facility]           



                                                  #[id number]           

 

     prednisolon      -0      Yes                      1 drop in           M

[facility]



     e acetate      4-10                               each eye,           l



     10 MG/ML      20:53:                               once           [facility]



     Ophthalmic      00                                 daily, 0           



     Suspension                                         Refill(s)           

 

     bromfenac      -0      Yes                      1 drop in           [facility]

[facility]



     0.7 MG/ML      4-10                               right eye,           l



     Ophthalmic      20:53:                               once           [facility]



     Solution      00                                 daily, 0           



     [Prolensa]                                         Refill(s)           

 

     Furosemide      -0      Yes                      = 1 tab,           [facility]

[facility]



     40 MG Oral      1-23                               PO, Every           l



     Tablet      15:13:                               Other Day,           [facility]

nn



               34                                 # 45 tab,           



                                                  Pharmacy:           



                                                  [facility]           



                                                  #[id number]           

 

     Mupirocin      2019      Yes                      See            [facility]



     0.02 MG/MG      2-27                               Instructio           l



     Topical      19:11:                               ns, # 66           [facility]

n



     Ointment      15                                 gm, APPLY           



                                                  EXTERNALLY           



                                                  TO THE           



                                                  AFFECTED           



                                                  AREA TWICE           



                                                  DAILY,           



                                                  Pharmacy:           



                                                  [facility]           



                                                  #[id number]           

 

     Nitrofurant      2019      Yes                      100 mg = 1           

[facility]



     oin 100 MG      2-13                               cap, PO,           l



     Oral      01:44:                               BID, X 5           [facility]



     Capsule      00                                 day, # 10           



     [Macrodanti                                         cap, 0           



     n]                                           Refill(s),           



                                                  Pharmacy:           



                                                  [facility]           



                                                  #[id number]           

 

     apixaban 5      2019      Yes                      5 mg, PO,           [facility]

[facility]



     MG Oral      0-25                               Q12H, 0           l



     Tablet      15:07:                               Refill(s)           [facility]

n



     [Eliquis]      00                                                

 

     metoprolol      2019      Yes                      50 mg = 1           Me

[facility]



     tartrate 50      0-25                               tab, PO,           l



     mg oral      15:07:                               BID, # 180           [facility]

daryl



     tablet      00                                 tab, 0           



                                                  Refill(s)           

 

     Diltiazem      2019      Yes                      180 mg = 1           [facility]

[facility]



     Hydrochlori      0-25                               cap, PO,           l



     de       15:07:                               Daily, #           [facility]

daryl



     mg/24 hours      00                                 30 cap, 0           



     oral                                         Refill(s)           



     capsule,                                                        



     extended                                                        



     release                                                        

 

     tramadol      2019      Yes                      150 mg = 1           [facility]

[facility]



     150 mg/24      0-25                               cap, PO,           l



     hours oral      15:07:                               Daily, 0           Her

hernandes



     capsule,      00                                 Refill(s)           



     extended                                                        



     release                                                        

 

     Acetaminoph      2019      Yes                      1 tab, PO,           

[facility]



     en 325 MG /      0-25                               Q6H, 0           l



     Hydrocodone      15:07:                               Refill(s)           H

[facility]



     Bitartrate      00                                                



     5 MG Oral                                                        



     Tablet                                                        



     [Norco                                                        



     5/325]                                                        

 

     calcitriol      2019      Yes                      = 1 cap,           [facility]

[facility]



     0.25 mcg      0-11                               PO, Daily,           l



     oral      21:10:                               # 90           [facility]



     capsule      30                                 unknown           



                                                  unit,           



                                                  Pharmacy:           



                                                  [facility]           



                                                  [facility]ORE           



                                                  #[id number]           

 

     gabapentin            Yes                      300 mg = 1           M

[facility]



     300 MG Oral      9-27                               cap, PO,           l



     Capsule      20:54:                               BID, # 180           [facility]

daryl



               00                                 cap, 3           



                                                  Refill(s),           



                                                  called to           



                                                  pharmacy           

 

     allopurinol            Yes                      = 1 tab,           [facility]

[facility]



     300 mg oral      8-17                               PO, Daily,           l



     tablet      02:39:                               # 90 tab,           [facility]

n



               31                                 Pharmacy:           



                                                  [facility]           



                                                  DR[facility]ORE           



                                                  #[id number]           

 

     QUEtiapine            Yes                      50 mg = 1           [facility]

[facility]



     50 mg oral      6-06                               tab, PO,           l



     tablet      15:28:                               Bedtime, #           [facility]

nn



               00                                 30 tab, 1           



                                                  Refill(s)           

 

     eletriptan            Yes                      40 mg = 1           [facility]

[facility]



     40 mg oral      6-06                               tab, PO,           l



     tablet      15:26:                               Daily, PRN           [facility]

nn



               00                                 for            



                                                  migraine           



                                                  headache,           



                                                  may repeat           



                                                  dose once           



                                                  in 2           



                                                  hours, # 6           



                                                  tab, 0           



                                                  Refill(s)           

 

     atorvastati            Yes                      See            [facility]

a



     n 40 mg      3-14                               Instructio           l



     oral tablet      15:07:                               ns, TAKE 1           

[facility]



               35                                 TABLET BY           



                                                  MOUTH           



                                                  EVERY           



                                                  NIGHT AT           



                                                  BEDTIME, #           



                                                  90 tab, 1           



                                                  Refill(s),           



                                                  Pharmacy:           



                                                  [facility]           



                                                  [facility]           



                                                  [id number]           

 

     amLODIPine            Yes                      See            [facility]



     5 mg oral      3-14                               Instructio           l



     tablet      15:07:                               ns, TAKE 1           [facility]

nn



               33                                 TABLET BY           



                                                  MOUTH           



                                                  EVERY DAY,           



                                                  # 90 tab,           



                                                  1              



                                                  Refill(s),           



                                                  Pharmacy:           



                                                  [facility]           



                                                  [facility]           



                                                  [id number]           

 

     lisinopril            Yes                      See            [facility]



     5 mg oral      8-21                               Instructio           l



     tablet      19:00:                               ns, TAKE 1           [facility]

nn



               30                                 TABLET BY           



                                                  MOUTH           



                                                  DAILY, #           



                                                  90 tab, 1           



                                                  Refill(s),           



                                                  Pharmacy:           



                                                  [facility]           



                                                  [facility]           



                                                  [id number]           

 

     atorvastati            Yes                      See            [facility]

a



     n 40 mg      8-21                               Instructio           l



     oral tablet      18:50:                               ns, TAKE 1           

[facility]



               45                                 TABLET BY           



                                                  MOUTH           



                                                  EVERY           



                                                  NIGHT AT           



                                                  BEDTIME, #           



                                                  30 tab, 5           



                                                  Refill(s),           



                                                  Pharmacy:           



                                                  [facility]           



                                                  [facility]           



                                                  [id number]           

 

     amLODIPine            Yes                      See            [facility]



     5 mg oral      8-21                               Instructio           l



     tablet      18:50:                               ns, TAKE 1           [facility]

nn



               41                                 TABLET BY           



                                                  MOUTH           



                                                  EVERY DAY,           



                                                  # 30 tab,           



                                                  5              



                                                  Refill(s),           



                                                  Pharmacy:           



                                                  [facility]           



                                                  [facility]           



                                                  [id number]           

 

     lisinopril            No                       See            [facility]



     5 mg oral      7-31                               Instructio           l



     tablet      15:28:                               ns, # 90           [facility]



               34                                 tab, TAKE           



                                                  1 TABLET           



                                                  BY MOUTH           



                                                  DAILY,           



                                                  Pharmacy:           



                                                  [facility]           



                                                  [facility]           



                                                  [id number]           

 

     allopurinol            No                       300 mg = 1           

[facility]



     300 mg oral      5-10                               tab, PO,           l



     tablet      13:59:                               Daily, #           [facility]



               00                                 90 tab, 1           



                                                  Refill(s),           



                                                  Pharmacy:           



                                                  [facility]           



                                                  [facility]           



                                                  [id number]           

 

     lisinopril            No                       See            [facility]



     5 mg oral      5-01                               Instructio           l



     tablet      12:38:                               ns, # 90           [place]



               18                                 tab, TAKE           



                                                  1 TABLET           



                                                  BY MOUTH           



                                                  DAILY,           



                                                  Pharmacy:           



                                                  [facility]           



                                                  [employer]           



                                                  [id number]           

 

     ALLOPURINOL            Yes                      See            [facility]

a



     300MG      5-01                               Instructio           l



     TABLETS      12:38:                               ns, # 90           [facility]

n



               18                                 tab, TAKE           



                                                  1 TABLET           



                                                  BY MOUTH           



                                                  DAILY,           



                                                  Pharmacy:           



                                                  [facility]           



                                                  [employer] [facility]           



                                                  [id number]           

 

     calcitriol            Yes                      0.25           [facility]



     0.25 mcg      3-28                               microgram           l



     oral      13:49:                               = 1 cap,           [facility]



     capsule      00                                 PO, Daily,           



                                                  # 90 cap,           



                                                  3              



                                                  Refill(s),           



                                                  Pharmacy:           



                                                  [facility]           



                                                  Drug Store           



                                                  [facility]           

 

     Mupirocin            Yes                      1 appl,           [facility]

ia



     0.02 MG/MG      3-21                               TOP, BID,           l



     Topical      15:37:                               30 grams           [facility]

n



     Ointment      21                                 3each, # 3           



                                                  ea, 1           



                                                  Refill(s),           



                                                  Pharmacy:           



                                                  [facility]           



                                                  Drug Store           



                                                  [facility]           

 

     atorvastati            Yes                      See            [facility]

a



     n 40 mg      3-21                               Instructio           l



     oral tablet      15:36:                               ns, TAKE 1           

[place]



               22                                 TABLET BY           



                                                  MOUTH           



                                                  EVERY           



                                                  NIGHT AT           



                                                  BEDTIME, #           



                                                  30 tab, 5           



                                                  Refill(s),           



                                                  Pharmacy:           



                                                  [facility]           



                                                  Drug Store           



                                                  [facility]           

 

     amLODIPine            Yes                      See            [facility]



     5 mg oral      3-21                               Instructio           l



     tablet      15:36:                               ns, TAKE 1           [place]

nn



               18                                 TABLET BY           



                                                  MOUTH           



                                                  EVERY DAY,           



                                                  # 30 tab,           



                                                  5              



                                                  Refill(s),           



                                                  Pharmacy:           



                                                  [facility]           



                                                  Drug Store           



                                                  [facility]           

 

     aspirin 81            Yes                      81 mg = 1           Me

[facility]ia



     mg tablet,      1-24                               tab, PO,           l



     enteric      16:34:                               Daily, #           [facility]

n



     coated      00                                 90 tab, 3           



                                                  Refill(s)           

 

     Xopenex            No   Ghassan K                0.63 mg =           Mem

oria



     0.63 mg/3      3-11           Chun                3 mL,           l



     mL        01:00:                               Soln, NEB,           [facility]



     inhalation      00                                 Once,           



     solution                                         first dose           



                                                  03/10/16           



                                                  19:00:00           



                                                  CST, stop           



                                                  date           



                                                  03/10/16           



                                                  19:00:00           



                                                  CST            

 

     Misc            No   Deuce                300 mL,           [facility]



     Medication      3-11           [place]                Soln-IV,           l



               00:03:                               IV, Once,           [place]



               00                                 first dose           



                                                  03/10/16           



                                                  18:03:00           



                                                  CST, stop           



                                                  date           



                                                  03/10/16           



                                                  18:03:00           



                                                  CST            

 

     promethazin            No   Ghassan K                12.5 mg =          

 [facility]



     e         3-11           [facility]                0.5 mL,           l



               00:02:                               Injection,           [place]



               00                                 IM, Once           



                                                  PRN for           



                                                  severe           



                                                  nausea,           



                                                  first dose           



                                                  03/10/16           



                                                  18:02:00           



                                                  CST            

 

     albuterol            No   Ghassan K                2.5 mg = 3           

[facility]



     2.5 mg/3 mL      3-11           [facility]                mL, Soln,           

l



     (0.083%)      00:02:                               NEB, Once           [facility]

daryl



     inhalation      00                                 PRN for           



     solution                                         wheezing,           



                                                  first dose           



                                                  03/10/16           



                                                  18:02:00           



                                                  CST            

 

     Demerol HCl            No   Ghassan K                12.5 mg =          

 [facility]



               3-11           [facility]                0.25 mL,           l



               00:02:                               Injection,           [place]



               00                                 IV Push,           



                                                  Once PRN           



                                                  for            



                                                  shivers,           



                                                  first dose           



                                                  03/10/16           



                                                  18:02:00           



                                                  CST            

 

     ondansetron            No   Ghassan K                4 mg = 2           

[facility]



               3-11           [facility]                mL,            l



               00:02:                               Injection,           [place]



               00                                 IV Push,           



                                                  q15min PRN           



                                                  for            



                                                  nausea/vom           



                                                  iting,           



                                                  order           



                                                  duration:           



                                                  2 doses,           



                                                  first dose           



                                                  03/10/16           



                                                  18:02:00           



                                                  CST, stop           



                                                  date           



                                                  Limited #           



                                                  of times           

 

     Dilaudid            No   Ghassan K                0.5 mg =           [facility]

oria



               3-11           [facility]                0.25 mL,           l



               00:02:                               Injection,           [place]



               00                                 IV Push,           



                                                  q10min PRN           



                                                  for pain           



                                                  severe           



                                                  (7-10),           



                                                  first dose           



                                                  03/10/16           



                                                  18:02:00           



                                                  CST            

 

     diphenhydrA            No   Ghassan K                25 mg =           M

[facility]



     MINE      3-11           [place]                0.5 mL,           l



               00:02:                               Injection,           [place]



               00                                 IV Push,           



                                                  Once PRN           



                                                  for            



                                                  itching,           



                                                  first dose           



                                                  03/10/16           



                                                  18:02:00           



                                                  CST            

 

     LR 1,000 mL            No   Ghassan K                1,000 mL,          

 [facility]



               3-11           [facility]                IV, 75           l



               00:02:                               mL/hr,           [facility]



               00                                 start date           



                                                  03/10/16           



                                                  18:02:00           



                                                  CST            

 

     Saline Lock            No   Ghassan K                10 mL,           Me

[facility]



     Flush      3-11           [facility]                Soln, IV           l



               00:02:                               Push, As           [place]



               00                                 Indicated           



                                                  PRN for           



                                                  flush,           



                                                  first dose           



                                                  03/10/16           



                                                  18:02:00           



                                                  CST            

 

     Bupivacaine            No   Ghassan K                300 mL,           M

[facility]



     0.25% 300      3-11           Chun                Nerve           l



     mL pump 300      00:02:                               Block, 5           He

[facility]



     mL        00                                 mL/hr,           



                                                  start date           



                                                  03/10/16           



                                                  18:02:00           



                                                  CST            

 

     Misc            No   Deuce                1,000 mL,           [facility]

a



     Medication      3-10           [place]                Soln-IV,           l



               23:42:                               IV, Once,           [place]



               00                                 first dose           



                                                  03/10/16           



                                                  17:42:00           



                                                  CST, stop           



                                                  date           



                                                  03/10/16           



                                                  17:42:00           



                                                  CST            

 

     fentaNYL            No   Deuce                25 mcg =           [facility]

[facility]



               3-10           [place]                0.5 mL,           l



               23:20:                               Injection,           [place]



               00                                 IV, Once,           



                                                  first dose           



                                                  03/10/16           



                                                  17:20:00           



                                                  CST, stop           



                                                  date           



                                                  03/10/16           



                                                  17:20:00           



                                                  CST            

 

     ondansetron            No   [facility]uce                4 mg = 2           

[facility]



               3-10           [place]                mL,            l



               23:03:                               Injection,           [place]



               00                                 IV, Once,           



                                                  first dose           



                                                  03/10/16           



                                                  17:03:00           



                                                  CST, stop           



                                                  date           



                                                  03/10/16           



                                                  17:03:00           



                                                  CST            

 

     fentaNYL            No   Deuce                25 mcg =           [facility]

[facility]



               3-10           [place]                0.5 mL,           l



               23:00:                               Injection,           [facility]



               00                                 IV, Once,           



                                                  first dose           



                                                  03/10/16           



                                                  17:00:00           



                                                  CST, stop           



                                                  date           



                                                  03/10/16           



                                                  17:00:00           



                                                  CST            

 

     fentaNYL            No   Deuce                25 mcg =           [facility]

[facility]



               3-10           [place]                0.5 mL,           l



               22:48:                               Injection,           [place]



               00                                 IV, Once,           



                                                  first dose           



                                                  03/10/16           



                                                  16:48:00           



                                                  CST, stop           



                                                  date           



                                                  03/10/16           



                                                  16:48:00           



                                                  CST            

 

     fentaNYL            No   Deuce                25 mcg =           [facility]

[facility]



               3-10           [place]                0.5 mL,           l



               22:37:                               Injection,           [place]



               00                                 IV, Once,           



                                                  first dose           



                                                  03/10/16           



                                                  16:37:00           



                                                  CST, stop           



                                                  date           



                                                  03/10/16           



                                                  16:37:00           



                                                  CST            

 

     dexamethaso            No   [facility]                8 mg = 2           

[facility]



     ne        3-10           [place]                mL,            l



               22:23:                               Injection,           [place]



               00                                 IV, Once,           



                                                  first dose           



                                                  03/10/16           



                                                  16:23:00           



                                                  CST, stop           



                                                  date           



                                                  03/10/16           



                                                  16:23:00           



                                                  CST            

 

     Misc            No   Deuce                1,000 mL,           [facility]

a



     Medication      3-10           [place]                Soln-IV,           l



               22:21:                               IV, Once,           [place]



               00                                 first dose           



                                                  03/10/16           



                                                  16:21:00           



                                                  CST, stop           



                                                  date           



                                                  03/10/16           



                                                  16:21:00           



                                                  CST            

 

     clindamycin            Yes  Deuce                928.125           M

[facility]



               3-10           [place]                mg,            l



               22:18:                               Soln-IV,           [place]



               00                                 IV, Once,           



                                                  first dose           



                                                  03/10/16           



                                                  16:18:00           



                                                  CST, stop           



                                                  date           



                                                  03/10/16           



                                                  16:18:00           



                                                  CST            

 

     fentaNYL            No   Deuce                25 mcg =           [facility]

[facility]



               3-10           [place]                0.5 mL,           l



               22:05:                               Injection,           [place]



               00                                 IV, Once,           



                                                  first dose           



                                                  03/10/16           



                                                  16:05:00           



                                                  CST, stop           



                                                  date           



                                                  03/10/16           



                                                  16:05:00           



                                                  CST            

 

     midazolam            No   Deuce                0.5 mg =           Me

[facility]



               3-10           [place]                0.5 mL,           l



               22:05:                               Injection,           [place]



               00                                 IV, Once,           



                                                  first dose           



                                                  03/10/16           



                                                  16:05:00           



                                                  CST, stop           



                                                  date           



                                                  03/10/16           



                                                  16:05:00           



                                                  CST            

 

     lidocaine            No   Deuce                3 mL,           [facility]

[facility]



               3-10           [facility]                Injection,           l



               21:58:                               IV, Once,           [facility]



               00                                 first dose           



                                                  03/10/16           



                                                  15:58:00           



                                                  CST, stop           



                                                  date           



                                                  03/10/16           



                                                  15:58:00           



                                                  CST            

 

     propofol            No   Deuce                120 mg =           [facility]

[facility]



               3-10           [place]                12 mL,           l



               21:58:                               Emulsion,           [facility]



               00                                 IV, Once,           



                                                  first dose           



                                                  03/10/16           



                                                  15:58:00           



                                                  CST, stop           



                                                  date           



                                                  03/10/16           



                                                  15:58:00           



                                                  CST            

 

     midazolam            No   Deuce                0.5 mg =           [facility]

[facility]



               3-10           [place]                0.5 mL,           l



               21:50:                               Injection,           [place]



               00                                 IV, Once,           



                                                  first dose           



                                                  03/10/16           



                                                  15:50:00           



                                                  CST, stop           



                                                  date           



                                                  03/10/16           



                                                  15:50:00           



                                                  CST            

 

     fentaNYL            No   Deuce                25 mcg =           [facility]

[facility]



               3-10           [place]                0.5 mL,           l



               21:50:                               Injection,           [facility]



               00                                 IV, Once,           



                                                  first dose           



                                                  03/10/16           



                                                  15:50:00           



                                                  CST, stop           



                                                  date           



                                                  03/10/16           



                                                  15:50:00           



                                                  CST            

 

     midazolam            No   Deuce                0.5 mg =           [facility]

[facility]



               3-10           [place]                0.5 mL,           l



               21:10:                               Injection,           [place]



               00                                 IV, Once,           



                                                  first dose           



                                                  03/10/16           



                                                  15:10:00           



                                                  CST, stop           



                                                  date           



                                                  03/10/16           



                                                  15:10:00           



                                                  CST            

 

     fentaNYL            No   Deuce                25 mcg =           [facility]

[facility]



               3-10           [place]                0.5 mL,           l



               21:10:                               Injection,           [facility]



               00                                 IV, Once,           



                                                  first dose           



                                                  03/10/16           



                                                  15:10:00           



                                                  CST, stop           



                                                  date           



                                                  03/10/16           



                                                  15:10:00           



                                                  CST            

 

     fentaNYL            No   Deuce                25 mcg =           [facility]

[facility]



               3-10           [place]                0.5 mL,           l



               21:05:                               Injection,           [place]



               00                                 IV, Once,           



                                                  first dose           



                                                  03/10/16           



                                                  15:05:00           



                                                  CST, stop           



                                                  date           



                                                  03/10/16           



                                                  15:05:00           



                                                  CST            

 

     midazolam            No   Deuce                0.5 mg =           [facility]

[facility]



               3-10           [place]                0.5 mL,           l



               21:05:                               Injection,           [facility]



               00                                 IV, Once,           



                                                  first dose           



                                                  03/10/16           



                                                  15:05:00           



                                                  CST, stop           



                                                  date           



                                                  03/10/16           



                                                  15:05:00           



                                                  CST            

 

     clindamycin            No   Yoan                900 mg, IV        

   [facility]



               3-10           Lei                Piggyback,           l



               20:00:                               Once,           [facility]



               00                                 infuse           



                                                  over 30           



                                                  minutes,           



                                                  first dose           



                                                  03/10/16           



                                                  14:00:00           



                                                  CST, stop           



                                                  date           



                                                  03/10/16           



                                                  14:00:00           



                                                  CST,           



                                                  Prophylaxi           



                                                  s              

 

     LR 1,000 mL            No   Ghassan K                1,000 mL,          

 [facility]



               3-10           Chun                IV, 30           l



               19:27:                               mL/hr,           [facility]



               00                                 start date           



                                                  03/10/16           



                                                  13:27:00           



                                                  CST            

 

     Lidocaine            No   Ghassan K                0.2 mL,           [facility]

[facility]



     2% 0.2 mL      3-10           Chun                Injection,           l



     IV Start      19:27:                               Subcutaneo           [facility]

[facility]



     [[facility]]      00                                 us, Once           



                                                  PRN for           



                                                  other (see           



                                                  comment),           



                                                  first dose           



                                                  03/10/16           



                                                  13:27:00           



                                                  CST            

 

     Seroquel            Yes                      100 mg,           [facility]

a



               3-09                               Oral,           l



               14:40:                               Daily, 0           [place]



               00                                 Refill(s),           



                                                  migraines           

 

     acetaminoph            Yes                      1 tabs,           [facility]

[facility]



     en-HYDROcod      3-09                               Oral,           l



     one 325      14:40:                               q6hr, 0           [facility]



     mg-5 mg      00                                 Refill(s),           



     oral tablet                                         pain           

 

     traMADol 50            Yes                      50 mg = 1           [facility]

[facility]



     mg oral      3-09                               tabs,           l



     tablet      14:40:                               Oral,           [place]



               00                                 q4hr, 0           



                                                  Refill(s),           



                                                  pain           

 

     amLODIPine-            Yes                      1 tabs,           [facility]

[facility]



     atorvastati      3-09                               Oral, qHS,           l



     n 5 mg-40      14:40:                               0              [facility]



     mg oral      00                                 Refill(s),           



     tablet                                         cholestero           



                                                  l/hyperten           



                                                  alexx           

 

     Osteo            Yes                      Oral,           [facility]



     Bi-Flex      3-09                               Daily, 0           l



               14:40:                               Refill(s),           [facility]



               00                                 supplement           

 

     Nature's            Yes                      1,000 mg =           [facility]

[facility]



     Bounty Red      3-09                               2 caps,           l



     Krill Oil      14:40:                               Oral, BID,           [facility]

[facility]



     500 mg oral      00                                 0              



     capsule                                         Refill(s),           



                                                  supplement           

 

     aspirin 81            Yes                      81 mg = 1           [facility]

[facility]



     mg oral      3-09                               tabs,           l



     tablet      14:40:                               Oral,           [place]



               00                                 Daily, 0           



                                                  Refill(s),           



                                                  supplement           

 

     Axert 12.5            Yes                      12.5 mg =           [facility]

[facility]



     mg oral      3-09                               1 tabs,           l



     tablet      14:40:                               Oral,           [facility]



               00                                 Once, PRN           



                                                  for            



                                                  migraine           



                                                  headache,           



                                                  may repeat           



                                                  dose once           



                                                  in 2           



                                                  hours, # 6           



                                                  tabs, 0           



                                                  Refill(s),           



                                                  migrainesm           



                                                  ay repeat           



                                                  dose once           



                                                  in 2 hours           

 

     Wellbutrin            Yes                      300 mg,           [facility]

[facility]



     XL        3-09                               Oral,           l



               14:40:                               q24hr, 0           [facility]



               00                                 Refill(s),           



                                                  migraines           







Immunizations







           Ordered Immunization Filled Immunization Date       Status     Commen

ts   Source



           Name       Name                                        

 

            influenza            2019-10-23 Completed             [facility] [place]



           vaccine-unspecified<            00:00:00                         [facility]

daryl



           sup>1</sup>                                             

 

           pneumococcal            2019 Completed             [facility] [place]



           23-valent             00:00:00                         [facility]



           vaccine<sup>3</sup>                                             

 

           Hx influenza            2011-10-25 Completed             [facility] [place]



           vaccine-unspecified<            14:42:42                         [facility]

daryl



           sup>1</sup>                                             

 

           Hx influenza            2011-10-25 Completed             [facility] [place]



           vaccine-unspecified<            14:42:42                         [facility]

daryl



           sup>2</sup>                                             







Vital Signs







             Vital Name   Observation Time Observation Value Comments     Source

 

             Heart Rate   2021 20:12:00                           [facility] [place]

 [facility]

 

             Heart Rate   2021 20:08:00                           [facility]

 [facility]

 

             Systolic (mm Hg) 2021 20:08:00                           [facility]

[facility]

 

             Diastolic (mm Hg) 2021 20:08:00                           [facility]

[facility] [place]

 

             Height       2021 20:08:00 165.1 cm                  [facility]

 [facility]

 

             Weight       2021 20:08:00                           [facility]

 [facility]

 

             BMI Calculated 2021 20:08:00                           [facility]

al [place]

 

             Systolic (mm Hg) 2020 15:59:00                           [facility]

[facility] [place]

 

             Diastolic (mm Hg) 2020 15:59:00                           [facility]

[facility] [place]

 

             Heart Rate   2020 15:59:00                           [facility]

 [facility]

 

             Height       2020 15:59:00 165.1 cm                  [facility]

 [facility]

 

             Weight       2020 15:59:00                           [facility]

 [facility]

 

             BMI Calculated 2020 15:59:00                           [facility]

al [place]

 

             Systolic (mm Hg) 2020 20:42:00                           [facility]

[facility] [place]

 

             Diastolic (mm Hg) 2020 20:42:00                           [facility]

[facility] [place]

 

             Heart Rate   2020 20:42:00                           [facility]

 [place]

 

             Temperature Oral (F) 2020 20:42:00 98.2 F                    

[facility] [place]

 

             Height       2020 20:42:00 170.18 cm                 [facility]

 [place]

 

             Weight       2020 20:42:00                           [facility]

 [facility]

 

             BMI Calculated 2020 20:42:00                           [facility]

al [place]

 

             Systolic (mm Hg) 2019 21:53:00                           [facility]

[facility] [place]

 

             Diastolic (mm Hg) 2019 21:53:00                           [facility]

[facility] [place]

 

             Heart Rate   2019 21:53:00                           [facility]

 [place]

 

             Temperature Oral (F) 2019 21:53:00 97.9 F                    

[facility]

 

             Height       2019 21:53:00 165.1 cm                  [facility]

 [place]

 

             Weight       2019 21:53:00                           [facility]

 [place]

 

             BMI Calculated 2019 21:53:00                           [facility]

al [place]

 

             Height       2019-10-25 14:54:00 165.1 cm                  [facility]

 [place]

 

             Weight       2019-10-25 14:54:00                           [facility]

 [facility]

 

             BMI Calculated 2019-10-25 14:54:00                           [facility]

al [place]

 

             Systolic (mm Hg) 2019-10-25 14:54:00                           [facility]

[facility] [place]

 

             Diastolic (mm Hg) 2019-10-25 14:54:00                           [facility]

[facility] [place]

 

             Heart Rate   2019-10-25 14:54:00                           [facility]

 [place]

 

             Temperature Oral (F) 2019-10-25 14:54:00 97.6 F                    

[facility] [place]

 

             Systolic (mm Hg) 2019-10-10 15:37:00                           [facility]

[facility] [place]

 

             Diastolic (mm Hg) 2019-10-10 15:37:00                           [facility]

[facility] [place]

 

             Heart Rate   2019-10-10 15:37:00                           [facility]

 [facility]

 

             Temperature Oral (F) 2019-10-10 15:37:00 98.2 F                    

[facility]

 

             Height       2019-10-10 15:37:00 165.1 cm                  [facility]

 [facility]

 

             Weight       2019-10-10 15:37:00                           [facility]

 [place]

 

             BMI Calculated 2019-10-10 15:37:00                           [facility]

al [place]

 

             Weight       2019 15:18:00                           [facility]

 [place]

 

             BMI Calculated 2019 15:18:00                           [facility]

al [place]

 

             Height       2019 15:18:00 165.1 cm                  [facility]

 [facility]

 

             Temperature Oral (F) 2019 15:18:00 98.4 F                    

[facility] [place]

 

             Heart Rate   2019 15:18:00                           [facility]

 [facility]

 

             Systolic (mm Hg) 2019 15:18:00                           [facility]

[facility] [place]

 

             Diastolic (mm Hg) 2019 15:18:00                           [facility]

[facility] [place]

 

             Height       2019 19:16:00 165.1 cm                  [facility]

 [facility]

 

             BMI Calculated 2019 19:16:00                           [facility]

al [place]

 

             Weight       2019 19:16:00                           [facility]

 [facility]

 

             Heart Rate   2019 19:16:00                           [facility]

 [facility]

 

             Systolic (mm Hg) 2019 19:16:00                           [facility]

[facility] [place]

 

             Diastolic (mm Hg) 2019 19:16:00                           [facility]

[facility] [place]

 

             Height       2019 14:26:00 167.01 cm                 [facility]

 [place]

 

             BMI Calculated 2019 14:26:00                           [facility]

al [place]

 

             Weight       2019 14:26:00                           [facility]

 [facility]

 

             Heart Rate   2019 14:26:00                           [facility]

 [place]

 

             Temperature Oral (F) 2019 14:26:00 97.9 F                    

[facility] [place]

 

             Systolic (mm Hg) 2019 14:26:00                           [facility]

[facility] [place]

 

             Diastolic (mm Hg) 2019 14:26:00                           [facility]

[facility] [place]

 

             Systolic (mm Hg) 2018 18:33:00                           [facility]

[facility] [place]

 

             Diastolic (mm Hg) 2018 18:33:00                           [facility]

[facility] [place]

 

             Heart Rate   2018 18:33:00                           [facility]

 [facility]

 

             Weight       2018 18:33:00                           [facility]

 [place]

 

             BMI Calculated 2018 18:31:00                           [facility]

al [place]

 

             Weight       2018 18:31:00                           [facility]

 [place]

 

             Systolic (mm Hg) 2018 18:31:00                           [facility]

[facility] [place]

 

             Diastolic (mm Hg) 2018 18:31:00                           [facility]

[facility] [place]

 

             Height       2018 18:31:00 170.18 cm                 [facility]

 [place]

 

             Temperature Oral (F) 2018 18:31:00 98.3 F                    

[facility]

 

             Heart Rate   2018 18:31:00                           [facility]

 [facility]

 

             Weight       2018 16:14:00                           [facility]

 [place]

 

             Height       2018 16:14:00 170.18 cm                 [facility]

 [place]

 

             BMI Calculated 2018 16:14:00                           [facility]

al [place]

 

             Heart Rate   2018 16:14:00                           [facility]

 [facility]

 

             Systolic (mm Hg) 2018 16:14:00                           [facility]

[facility] [place]

 

             Diastolic (mm Hg) 2018 16:14:00                           [facility]

[facility] [place]

 

             BMI Calculated 2018 15:06:00                           [facility]

al [place]

 

             Height       2018 15:06:00 170.18 cm                 [facility]

 [place]

 

             Weight       2018 15:06:00                           [facility]

 [place]

 

             Systolic (mm Hg) 2018 15:06:00                           [facility]

[facility] [place]

 

             Diastolic (mm Hg) 2018 15:06:00                           [facility]

[facility] [place]

 

             Heart Rate   2018 15:06:00                           [facility]

 [facility]

 

             Temperature Oral (F) 2018 15:06:00 97.9 F                    

[facility] [place]

 

             Weight       2017 16:17:00                           [facility]

 [place]

 

             Height       2017 16:17:00 170.18 cm                 [facility]

 [place]

 

             BMI Calculated 2017 16:17:00                           [facility]

al [place]

 

             Respitory Rate 2016 01:25:00                           [facility]

al [place]

 

             Systolic (mm Hg) 2016 00:50:00                           [facility]

[facility] [place]

 

             Respitory Rate 2016 00:50:00                           [facility]

al [place]

 

             Heart Rate   2016 00:50:00                           [facility]

 [place]

 

             Systolic (mm Hg) 2016 00:40:00                           [facility]

rial [place]

 

             Respitory Rate 2016 00:40:00                           [facility]

al [place]

 

             Heart Rate   2016 00:40:00                           [facility]

 [place]

 

             Heart Rate   2016 00:30:00                           [facility]

 [place]

 

             Systolic (mm Hg) 2016 00:30:00                           [facility]

rial [place]

 

             Temperature Oral (F) 2016-03-10 23:50:00 37.1 Sherlyn                  

[facility] [place]

 

             Height       2016-03-10 19:23:00 169 cm                    [facility]

 [place]

 

             Weight       2016-03-10 19:23:00                           [facility]

 [place]

 

             Temperature Oral (F) 2016-03-10 19:23:00 36.6 Sherlyn                  

[facility] [place]

 

             Height       2016 14:13:00 170 cm                    [facility]

 [place]

 

             Weight       2016 14:13:00                           [facility]

 [place]







Procedures







                Procedure       Date / Time Performed Performing Clinician [facility]

e

 

                Diabetic retinal eye 2020 06:00:00                 Dillan Mart



                exam<sup>1</sup>                                 

 

                Mammogram       2017-07-15 05:00:00                 Doreen hernandes

 

                Colonoscopy<sup>2</sup> 2017 06:00:00                 Hakeem

rial [place]

 

                OPEN REDUCTION INTERNAL 2016-03-10 22:13:00 Yoan Lei [facility]

rial [place]



                FIXATION RADIUS                                 



                INTRA-ARTICULAR W/3                                 



                FRAGMENTS 11777                                 



                (Right)<sup>1</sup>                                 

 

                Repair of       2016-03-10 06:00:00                 Doreen hernandes



                wrist<sup>1</sup>                                 

 

                skin cancer removed from 2012 00:00:00                 [facility]

[facility] [place]



                arm                                             

 

                Skin cancer of                                  [facility] [place]



                arm<sup>2</sup>                                 

 

                Procedure<sup>2</sup>                                 [facility]

[facility]

 

                Tubal ligation                                  [facility]

 

                Eye operation                                   [facility]

 

                Biopsy of breast                                 [facility]

n

 

                bilateral radial                                 [facility]

n



                kerototomy                                      

 

                bilateral tubal ligation                                 [facility]

l [place]

 

                colonoscopy                                     [facility]







Encounters







        Start   End     Encounter Admission Attending Care    Care    Encounter 

Source



        Date/Time Date/Time Type    Type    Clinicians Facility Department ID   

   

 

        2021 Outpatient         DANIELLA  [facility]     [id number]

[address] [place]



        00:00:00 00:00:00                 TIMAIKATY                 834     M[place]

farhana



                                                                        st

 

        2021 Between                 nullFlavo [facility] 3467

[id number] [facility]



        14:18:24 14:18:24 Visit                   r       Medicine 62      l



                                                        Rudy Hines

n

 

        2021 Between                 nullFlavo [facility] 3467

[id number] [facility]



        00:56:47 00:56:47 Visit                   r       Medicine 61      l



                                                        Rudy Hines

n

 

        2021 Outpatient                 nullFlavo [facility] 3

[id number] [facility]



        20:00:00 05:59:59                         r       Medicine 52      l



                                                        Grantshyanne Hines

n

 

        2021 Phone                   nullFlavo [facility] 3467

[id number] [facility]



        21:38:38 05:59:59 Message                 r       Medicine 13      l



                                                        Rudy Hines

n

 

        2021 Outpatient         ANABEL [facility]     [id number]

[address] [place]



        00:00:00 00:00:00                 DAYLIN Beckwith3     Method

i



                                                                        st

 

        2021-11-15 2021 Between                 nullFlavo [facility] 3467

[id number] [facility]



        15:33:59 15:33:59 Visit                   r       Medicine 59      l



                                                        Rudy Hines

n

 

        2021 2021-10-05 Inpatient         SOLIPURAM, [facility]     074     [id number]

[address] [place]



        00:00:00 00:00:00                 MELISSA                    329     Method

i



                                                                        st

 

        2021 Outpatient         LAKHWINDER, [facility]     2100

[id number] [place]



        00:00:00 00:00:00                 JULIANNA                 104     Method

i



                                                                        st

 

        2021 Outpatient         [facility]     [id number]

[address] [place]



        00:00:00 00:00:00                 RAZIUDDIN                 273     Meth

farhana



                                                                        

 

        2021 Outpatient         EKERUO, [facility]     [id number]

[address] [place]



        00:00:00 00:00:00                 DAYLIN                  787     Method

i



                                                                        

 

        2021 Outpatient         DAOURA, [facility]     [id number]

587 [place]



        00:00:00 00:00:00                 MAGY                 546     Method

i



                                                                        

 

        2021 Inpatient         MATHIVANAN, [facility]     064     [id number]

343464 [place]



        00:00:00 00:00:00                 MELVIN                   275     Method

i



                                                                        

 

        2021 Outpatient         AHMED,  [facility]     [id number]

068 [place]



        00:00:00 00:00:00                 RAZIUDDIN                 199     Meth

farhana



                                                                        

 

        2021 Outpatient         EKERUO, [facility]     021     [id number]

337 [place]



        00:00:00 00:00:00                 DAYLIN                  640     Method

i



                                                                        

 

        2021 Outpatient         EKERUO, [facility]     [id number]

412 [place]



        00:00:00 00:00:00                 DAYLIN                  435     Method

i



                                                                        

 

        2021 Outpatient         EKERUO, [facility]     [id number]

[address] [place]



        00:00:00 00:00:00                 DAYLIN                  537     Method

i



                                                                        

 

        2021 Outpatient         EKERUO, [facility]     [id number]

029 [place]



        00:00:00 00:00:00                 DAYLIN                  709     Method

i



                                                                        

 

        2021 Inpatient         SOLIPURAM, [facility]     064     [id number]

11845 [place]



        00:00:00 00:00:00                 MELISSA                    685     Method

i



                                                                        

 

        2021 Outpatient         AHMED,  [facility]     [id number]

046 [place]



        00:00:00 00:00:00                 RAZIUDDIN                 101     Meth

farhana



                                                                        

 

        2021 Outpatient                 [facility] 3467

[id number] [facility]



        01:00:00 04:59:00                         r       [place] 58      l



                                                        [place]         Meredith



 

        2021 Outpatient         AHMED,  [facility]    PUL     [id number]   

 [facility]



        20:00:00 23:59:00                 RAZIUDDIN                         

 

        2021 Outpatient         DANIELLA,  [facility]     [id number]

[address] [place]



        00:00:00 00:00:00                 RAZIUDDIN                 598     Meth

farhana



                                                                        

 

        2021-04-15 2021 Inpatient         ANAIS, [facility]     064     [id number]

[id number] [place]



        00:00:00 00:00:00                 MELVIN                   060     Method

i



                                                                        

 

        2021 Outpatient                 [facility]     [id number]

422 [place]



        00:00:00 00:00:00                                         470     Method

i



                                                                        

 

        2021 Outpatient                 nullFlavo [facility] 3

[id number] [facility]



        19:30:00 04:59:59                         r       Medicine 51      l



                                                        Rudy Hines

n

 

        2021 Outpatient                 [facility]     [id number]

[address] [place]



        00:00:00 00:00:00                                         398     Method

i



                                                                        

 

        2021 Between                 [facility]Flavo [facility] 3467

[id number] [facility]



        13:38:03 13:38:03 Visit                   r       Medicine 57      l



                                                        Rudy Hines

n

 

        2021 Outpatient         DANIELLA,  [facility]     [id number]

[address] [place]



        00:00:00 00:00:00                 RAZIUDDIN                 554     Meth

farhana



                                                                        

 

        2021 Inpatient         ANAIS, [facility]     025     2100

[id number] [place]



        00:00:00 00:00:00                 MELVIN                   541     Method

i



                                                                        

 

        2021 Inpatient         ANAIS, [facility]     Ayse     2100

[id number] [place]



        00:00:00 00:00:00                 MELVIN                   559     Method

i



                                                                        

 

        2020 Inpatient         ANAIS, [facility]     012     

[id number] [place]



        00:00:00 00:00:00                 MELVIN                   271     Method

i



                                                                        

 

        2020 Outpatient                 [facility]Fl[facility] 3

[id number] [facility]



        16:30:00 05:59:59                         r       Medicine 50      l



                                                        Rudy martinez

 

        2020 Inpatient         SOLIPURAM, [facility]     012     [id number]

[address]120 [place]



        00:00:00 00:00:00                 MELISSA                    464     Method

i



                                                                        

 

        2020-10-23 2020 Inpatient         TONO, [facility]     012     [id number]

[address]330 [place]



        00:00:00 00:00:00                 MELISSA                    557     Method

i



                                                                        

 

        2020-10-23 2020-10-24 Outpt Diag                 nullFlavo [facility]    [id number]

[id number] [facility]



        18:10:00 04:59:00 Services                 r       Outpatient 06      l



                                                        Imaging         [place]



                                                        [place]         

 

        2020-10-21 2020-10-22 Between                 nullFlavo [facility] 3467

[id number] [facility]



        17:58:19 17:58:19 Visit                   r       Medicine 56      l



                                                        Rudy Hines

n

 

        2020-10-13 2020-10-14 Outpatient                 nullFlavo [facility] 3

[id number] [facility]



        15:15:00 04:59:59                         r       Medicine 49      l



                                                        Rudy Hines

n

 

        2020 Outpt Diag                 nullFlavo [facility]    [id number]

38404 [facility]



        17:02:00 04:59:00 Services                 r       Outpatient 05      l



                                                        Imaging         [place]



                                                        [place]         

 

        2020 Ambulatory                 [facility]    [id number]

[id number] [facility]



        19:00:00 19:00:00 Pre-Reg                 r       Nephrology 46      l



                                                        Rudy Hines

n

 

        2020 Outpatient                 [facility]    [id number]

365 [facility]



        11:15:00 11:15:00                                         47      l



                                                                        [place]

 

        2020 Outpatient                 [facility]    [id number]

[id number] [facility]



        19:45:00 04:59:59                         r       Nephrology 48      l



                                                        Rudy Hines

n

 

        2020 Outpatient         MANGIN, [facility]     [id number]

467 [place]



        00:00:00 00:00:00                 EMILIA                    832     Method

i



                                                                        

 

        2020 Phone                   [facility]avo [facility]    [id number]

55 [facility]



        16:17:50 04:59:59 Message                 r       Nephrology 12      l



                                                        Rudy Hines

n

 

        2020 Outpatient                 [facility]avo [facility]    [id number]

[id number] [facility]



        19:00:00 04:59:59                         r       Nephrology 41      l



                                                        Rudy Hines

n

 

        2020 Phone                   [facility]    [id number]

55 [facility]



        18:35:51 04:59:59 Message                 r       Nephrology 11      dennis Arguello         [place]

 

        2020 Outpatient                 [facility] 3

[id number] [facility]



        15:15:00 04:59:59                         r       Medicine 45      dennis Hines

michelle

 

        2020 Ambulatory                 [facility]Fl[facility] 3

[id number] [facility]



        15:15:00 15:15:00 Pre-Reg                 r       Medicine 44      l



                                                        Rudy Hines

michelle

 

        2020 Phone                   nullFlavo [facility]    [id number]

55 [facility]



        13:23:32 04:59:59 Message                 r       Nephrology 10      dennis Rosariosylvie martinez

 

        2020 Between                 nullFlavo [facility] 3467

[id number] [facility]



        14:14:54 14:14:54 Visit                   r       Medicine 52      l



                                                        Rudy         Donny martinez

 

        2020 Outpatient                 [facility]    [facility]    [id number]

365 [facility]



        11:30:00 11:30:00                                         43      dennis



                                                                        [place]

 

        2020 2020-04-15 Phone                   nullFlavo [facility] 3467

[id number] [facility]



        20:00:15 04:59:59 Message                 r       Medicine 09      dennis Rosariosylvie martinez

 

        2020-04-10 2020 Phone                   nullFlavo [facility] 3467

[id number] [facility]



        17:18:28 04:59:59 Message                 r       Medicine 08      dennis Rosariosylvie martinez

 

        2020-04-10 2020 Phone                   nullFlavo [facility]    [id number]

55 [facility]



        13:26:55 04:59:59 Message                 r       Nephrology 07      l



                                                        Rudy Hines

michelle

 

        2020 Outpatient                 nullFlavo [facility] 3

[id number] [facility]



        20:15:00 05:59:59                         r       Medicine 42      dennis Grant         Donny martinez

 

        2020 Phone                   nullFlavo [facility] 3467

[id number] [facility]



        14:22:27 05:59:59 Message                 r       Medicine 06      dennis Palacioberg         Donny martinez

 

        2019 Phone                   nullFlavo [facility] 3467

[id number] [facility]



        16:06:04 05:59:59 Message                 r       Medicine 05      dennis Palacioberg         Donny martinez

 

        2019 Between                 nullFlavo [facility]    [id number]

75 [facility]



        20:05:03 20:05:03 Visit                   r       Nephrology 45      dennis Mart

 

        2019 Outpatient                 nullFlavo [facility]    [id number]

[id number] [facility]



        20:45:00 05:59:59                         r       Nephrology 38      l



                                                        Rudy Hines

michelle

 

        2019 Between                 nullFlavo [facility]    [id number]

75 [facility]



        00:07:10 00:07:10 Visit                   r       Nephrology 43      l



                                                        Saundra Mart

 

        2019 Outpatient                 [facility]    [facility]    [id number]

365 [facility]



        09:00:00 09:00:00                                         39      dennis



                                                                        [place]

 

        2019 Between                 [facility]Flavo [facility] 3467

[id number] [facility]



        21:47:12 21:47:12 Visit                   r       Medicine 41      dennis Hines

michelle

 

        2019-10-29 2019-10-30 Between                 [facility]Flavo [facility] 3467

[id number] [facility]



        22:13:13 22:13:13 Visit                   r       Medicine 40      dennis Hines

michelle

 

        2019-10-25 2019-10-26 Outpatient                 [facility]Flavo [facility] 3

[id number] [facility]



        14:45:00 04:59:59                         r       Medicine 40      dennis Hines

michelle

 

        2019-10-17 2019-10-17 Ambulatory                 [facility]Flavo [facility] 3

[id number] [facility]



        16:00:00 16:00:00 Pre-Reg                 r       Medicine 36      dennis Hines

michelle

 

        2019-10-10 2019-10-11 Outpatient                 nullFlavo [facility]    [id number]

[id number] [facility]



        15:00:00 04:59:59                         r       Nephrology 35      l



                                                        Rudy Hines

michelle

 

        2019-10-10 2019-10-10 Outpatient                 [facility]    [facility]    [id number]

365 [place]



        12:00:00 12:00:00                                         37      dennis



                                                                        [place]

 

        2019 Phone                   [facility] 3467

[id number] [facility]



        15:15:06 04:59:59 Message                 r       Medicine 04      dennis Hines

michelle

 

        2019 Phone                   nullFlavo [facility]    [id number]

55 [place]



        15:10:51 04:59:59 Message                 r       Nephrology 03      dennis martinez

 

        2019 Ambulatory                 nullFlavo [facility]    [id number]

[id number] [facility]



        20:00:00 20:00:00 Pre-Reg                 r       Nephrology 34      dennis martinez

 

        2019 Between                 nullFlavo [facility]    [id number]

75 [place]



        20:15:16 20:15:16 Visit                   r       Nephrology 34      dennis Mart

 

        2019 Phone                   nullFlavo [facility]    [id number]

55 [place]



        15:29:54 04:59:59 Message                 r       Nephrology 02      dennis Mart

 

        2019 Ambulatory                 nullFlavo [facility]    [id number]

[id number] [facility]



        16:30:00 16:30:00 Pre-Reg                 r       Nephrology 29      dennis martinez

 

        2019 Ambulatory                 nullFlavo [facility]    [id number]

[id number] [facility]



        16:15:00 16:15:00 Pre-Reg                 r       Nephrology 30      dennis martinez

 

        2019 Ambulatory                 [facility]Fl[facility] 3

[id number] [facility]



        15:15:00 15:15:00 Pre-Reg                 r       Medicine 31      dennis Hines



 

        [facility]    [id number]

69 [place]



        10:05:00 17:55:00                 GOPAL                         [facility]

[facility]

 

        [facility]    [id number]

427 [place]



        21:36:00 19:00:00                 GOPAL                         [facility]

[facility]

 

        2019 Outpatient                 nullFlavo  Urgent 346

[id number] [facility]



        20:40:00 04:59:59                         r       Care    33      l



                                                        Candace Rosarioann

 

        2019 Outpatient                 [facility]Flavo [facility] 3

[id number] [facility]



        15:15:00 04:59:59                         r       Medicine 32      dennis martinez

 

        2019 Between                 [facility]Flavo [facility] 3467

[id number] [facility]



        19:00:16 19:00:16 Visit                   r       Medicine 29      dennis Hines

michelle

 

        2019 2019-03-15 Between                 nullFlavo [facility]    [id number]

75 [facility]



        15:02:37 15:02:37 Visit                   r       Nephrology 27      l



                                                        Rudy Hines

n

 

        2019 2019-03-15 Outpatient                 [facility]Flavo [facility]    [id number]

[id number] [facility]



        18:45:00 04:59:59                         r       Nephrology 28      l



                                                        Rudy martinez

 

        2019 2019-03-15 Outpatient                 [facility]Flavo [facility]

[id number] [facility]



        14:15:00 04:59:59                         r       Medicine 22      l



                                                        Rudy Hines

n

 

        2019 Between                 [facility]Flavo [facility]    [id number]

75 [facility]



        23:59:47 23:59:47 Visit                   r       Nephrology 26      l



                                                        Rudy martinez

 

        2019 Between                 [facility]Flavo [facility]    [id number]

75 [place]



        22:00:33 22:00:33 Visit                   r       Nephrology 24      dennis martinez

 

        2019 Between                 [facility]Flavo [facility]    [id number]

75 [place]



        04:48:44 04:48:44 Visit                   r       Nephrology 23      l



                                                        Rudy Hines

michelle

 

        2019 Ambulatory                 [facility]Flavo [facility]    [id number]

[id number] [facility]



        20:30:00 20:30:00 Pre-Reg                 r       Nephrology 27      dennis Hines

michelle

 

        2019 Ambulatory                 [facility]Flavo [facility]    [id number]

[id number] [facility]



        18:40:00 18:40:00 Pre-Reg                 r       Nephrology 24      dennis Hines

michelle

 

        2019 Ambulatory                 nullFlavo [facility]    [id number]

[id number] [facility]



        15:30:00 15:30:00 Pre-Reg                 r       Internal 25      dennis Grant         

 

        2018 2018-10-20 Ambulatory                 nullFlavo [facility]    [id number]

[id number] [facility]



        12:45:00 12:45:00 Pre-Reg                 r       Internal 23      dennis Grant         

 

        2018 Outpatient                 nullFlavo [facility]    [id number]

[id number] [facility]



        19:15:00 04:59:59                         r       Nephrology 26      dennis Hines

michelle

 

        2018 Outpatient                 nullFlavo [facility]    [id number]

[id number] [facility]



        18:00:00 04:59:59                         r       Nephrology 21      l



                                                        Rudy Hines

michelle

 

        2018 Outpatient                 nullFlavo [facility]

[id number] [facility]



        18:30:00 04:59:59                         r       Medicine 20      dennis Hines

n

 

        2018 Outpatient                 nullFlavo [facility]    [id number]

[id number] [facility]



        16:00:00 04:59:59                         r       Nephrology 19      dennis Hines

michelle

 

        2018 Outpatient                 nullFlavo [facility]

[id number] [facility]



        15:00:00 04:59:59                         r       Medicine 18      l



                                                        Rudy Hines

michelle

 

        2017 Outpatient                 [facility]    [id number]

365 [facility]



        10:40:00 10:40:00                                         16      dennis Mart

 

        2017 Outpatient                 [facility]    [id number]

365 [facility]



        11:30:00 11:30:00                                         17      dennis Mart

 

        2017 Outpatient                 [facility]    [id number]

365 [facility]



        11:40:00 11:40:00                                         11      dennis Mart

 

        2017 Outpatient                 [facility]    [id number]

365 [facility]



        14:30:00 14:30:00                                         15      dennis Mart

 

        2017 Outpatient                 [facility]    [id number]

365 [facility]



        10:00:00 10:00:00                                         08      dennis Mart

 

        2017 Bedded                  nullFlavo [facility] [id number]

375 [facility]



        11:48:35 14:30:00 Outpatient                 xuan Mart 13      dennis beard

 

        2017 Outpatient                 [facility]    [id number]

365 [facility]



        13:15:00 13:15:00                                         14      dennis Mart

 

        2016 Outpatient                 [facility]    [id number]

365 [facility]



        09:30:00 09:30:00                                         12      dennis Mart

 

        2016 Outpatient                 [facility]    [id number]

365 [facility]



        09:30:00 09:30:00                                         13      dennis Mart

 

        2016 Outpatient                 [facility]    [id number]

365 [facility]



        10:20:00 10:20:00                                         09      dennis Mart

 

        2016 Outpatient                 [facility]    [id number]

365 [facility]



        13:00:00 13:00:00                                         04      l



                                                                        [place]

 

        [id number]



        11:15:00 11:15:00                                         06      dennis



                                                                        [place]

 

        2016 OP Therapy                 nullFlavo [facility]     [id number]

[id number] [facility]



        13:00:00 04:59:00 Patients                 r       [place] 03      l



                                                        Jl Mart

 

        2016 OP Therapy                 nullFlavo [facility]     [id number]

[id number] [facility]



        17:39:00 04:59:00 Patients                 r       Regulo 02      l



                                                        Jl Mart

 

        2016 OP Therapy                 nullFlavo [facility] [id number]

[id number] [facility]



        19:00:00 04:59:00 Patients                 r       [place]   01      dennis



                                                                        [place]

 

        2016 Outpt Diag                 nullFlavo [facility]    [id number]

[id number] [facility]



        16:14:00 04:59:00 Services                 r       Outpatient 04      l



                                                        Imaging         [place]



                                                        [place]         

 

        2016 OP Therapy                 nullFlavo [facility] [id number]

[id number] [facility]



        19:00:00 04:59:00 Patients                 r       [place]   00      dennis



                                                                        [place]

 

        2016 Outpatient                 [facility]    IE    [id number]

365 [facility]



        10:20:00 10:20:00                                         01      dennis



                                                                        [place]

 

        2016-05-10 2016 Outpt Diag                 nullFlavo [facility]    [id number]

[id number] [facility]



        14:59:00 04:59:00 Services                 r       Outpatient 03      l



                                                        Imaging         [place]            

 

        2016 Outpt Diag                 nullFlavo [facility]    [id number]

62355 [facility]



        18:11:00 04:59:00 Services                 r       Outpatient 01      l



                                                        Imaging         [place]



                                                        [place]         

 

        2016-03-10 2016-03-10 Outpatient                 nullFlavo [facility]    [id number]

   [facility]



        12:50:31 19:25:00                         r                       dennis



                                                                        [place]

 

        2016 2016-02-10 Outpt Diag                 nullFlavo [facility]    [id number]

[id number] [facility]



        12:58:00 05:59:00 Services                 r       Outpatient 00      l



                                                        Imaging         [place]



                                                        [place]         

 

        [facility]    [id number]

[facility]



        10:15:00 10:15:00                                         02      dennis Mart

 

        2015 Outpatient                 [facility]    [id number]

365 [facility]



        11:30:00 11:30:00                                         00      l



                                                                        [place]

 

        2014 Outpatient                 [place] [facility] 3467

2273_3 [facility]



        01:16:00 05:59:00                         r       [place] [id number] l



                                                        [place] 1       [place]

nn

 

        2014 Outpatient                 nullFlavo       [id number]

15704 [facility]



        19:16:00 19:16:00                         r       [place] 01      l



                                                                        [place]

 

        2014 Outpatient                 nullFlavo       [id number]

[id number] [facility]



        19:43:00 19:43:00                         r       [place] 00      l



                                                                        [place]







Results







           Test Description Test Time  Test Comments Results    Result Comments 

Source









                          SARS-CoV-2 (COVID-19) RNA [Presence] in Respiratory sp

ecimen by 2021 

22:34:30                                



                    EDWARD with probe detection                     









                      Test Item  Value      Reference Range Interpretation Comme

nts









             SARS-CoV-2 (COVID-19) RNA [Presence] in Respiratory Not detected No

t-Detected              



             specimen by EDWARD with probe detection (test code =                  

                      



             30867-6)                                            

 

             Whether patient is employed in a healthcare setting                

                        



             (test code = 74841-0)                                        

 

             Whether the patient has symptoms related to condition              

                          



             of interest (test code = 28502-1)                                  

      

 

             Patient was hospitalized because of this condition                 

                       



             (test code = 67624-7)                                        

 

             Whether the patient was admitted to intensive care unit            

                            



             (ICU) for condition of interest (test code = 18721-6)              

                          

 

             Whether patient resides in a congregate care setting               

                         



             (test code = 75976-7)                                        



SARS-CoV-2 (COVID-19) RNA [Presence] in Respiratory specimen by EDWARD with probe 
[id number] 22:35:42





             Test Item    Value        Reference Range Interpretation Comments

 

             SARS-CoV-2 (COVID-19) RNA Not detected Not-Detected              



             [Presence] in Respiratory                                        



             specimen by EDWARD with probe                                        



             detection (test code = 39776-3)                                    

    

 

             Whether patient is employed in a                                   

     



             healthcare setting (test code =                                    

    



             39393-2)                                            

 

             Whether the patient has symptoms                                   

     



             related to condition of interest                                   

     



             (test code = 22654-2)                                        

 

             Patient was hospitalized because                                   

     



             of this condition (test code =                                     

   



             54611-9)                                            

 

             Whether the patient was admitted                                   

     



             to intensive care unit (ICU) for                                   

     



             condition of interest (test code                                   

     



             = 42249-2)                                          

 

             Whether patient resides in a                                       

 



             congregate care setting (test                                      

  



             code = 45248-3)                                        



SARS-CoV-2 (COVID-19) RNA [Presence] in Respiratory specimen by EDWARD with probe 
[id number] 22:46:10





             Test Item    Value        Reference Range Interpretation Comments

 

             SARS-CoV-2 (COVID-19) RNA Not detected Not-Detected              



             [Presence] in Respiratory                                        



             specimen by EDWARD with probe                                        



             detection (test code = 64787-3)                                    

    

 

             Whether patient is employed in a                                   

     



             healthcare setting (test code =                                    

    



             00151-3)                                            

 

             Whether the patient has symptoms                                   

     



             related to condition of interest                                   

     



             (test code = 05765-7)                                        

 

             Patient was hospitalized because                                   

     



             of this condition (test code =                                     

   



             65211-5)                                            

 

             Whether the patient was admitted                                   

     



             to intensive care unit (ICU) for                                   

     



             condition of interest (test code                                   

     



             = 79358-1)                                          

 

             Whether patient resides in a                                       

 



             congregate care setting (test                                      

  



             code = 44653-9)                                        



SARS-CoV-2 (COVID-19) RNA [Presence] in Respiratory specimen by EDWARD with probe 
[id number] 01:54:24





             Test Item    Value        Reference Range Interpretation Comments

 

             SARS-CoV-2 (COVID-19) RNA Not detected Not-Detected              



             [Presence] in Respiratory                                        



             specimen by EDWARD with probe                                        



             detection (test code = 10718-8)                                    

    

 

             Whether patient is employed in a                                   

     



             healthcare setting (test code =                                    

    



             38134-3)                                            

 

             Whether the patient has symptoms                                   

     



             related to condition of interest                                   

     



             (test code = 18570-9)                                        

 

             Patient was hospitalized because                                   

     



             of this condition (test code =                                     

   



             60526-4)                                            

 

             Whether the patient was admitted                                   

     



             to intensive care unit (ICU) for                                   

     



             condition of interest (test code                                   

     



             = 34140-1)                                          

 

             Whether patient resides in a                                       

 



             congregate care setting (test                                      

  



             code = 87586-7)                                        



SARS-CoV-2 (COVID-19) RNA [Presence] in Respiratory specimen by EDWARD with probe 
[id number] 21:44:06





             Test Item    Value        Reference Range Interpretation Comments

 

             SARS-CoV-2 (COVID-19) RNA Not detected Not-Detected              



             [Presence] in Respiratory                                        



             specimen by EDWARD with probe                                        



             detection (test code = 05530-1)                                    

    

 

             Whether patient is employed in a                                   

     



             healthcare setting (test code =                                    

    



             94036-6)                                            

 

             Whether the patient has symptoms                                   

     



             related to condition of interest                                   

     



             (test code = 75300-7)                                        

 

             Patient was hospitalized because                                   

     



             of this condition (test code =                                     

   



             72082-4)                                            

 

             Whether the patient was admitted                                   

     



             to intensive care unit (ICU) for                                   

     



             condition of interest (test code                                   

     



             = 16509-8)                                          

 

             Whether patient resides in a                                       

 



             congregate care setting (test                                      

  



             code = 39427-2)                                        



SARS-CoV-2 (COVID-19) RNA [Presence] in Respiratory specimen by EDWARD with probe 
[id number] 00:36:59





             Test Item    Value        Reference Range Interpretation Comments

 

             SARS-CoV-2 (COVID-19) RNA Not detected Not-Detected              



             [Presence] in Respiratory                                        



             specimen by EDWARD with probe                                        



             detection (test code = 01030-2)                                    

    



SARS-CoV-2 (COVID-19) RNA [Presence] in Respiratory specimen by EDWARD with probe 
[id number] 17:35:35





             Test Item    Value        Reference Range Interpretation Comments

 

             SARS-CoV-2 (COVID-19) RNA Not detected Not-Detected              



             [Presence] in Respiratory                                        



             specimen by EDWARD with probe                                        



             detection (test code = 63642-3)                                    

    



SARS-CoV-2 (COVID-19) RNA [Presence] in Respiratory specimen by EDWARD with probe 
[id number] 18:03:30





             Test Item    Value        Reference Range Interpretation Comments

 

             SARS-CoV-2 (COVID-19) RNA Not detected Not-Detected              



             [Presence] in Respiratory                                        



             specimen by EDWARD with probe                                        



             detection (test code = 17636-1)                                    

    



SARS-CoV-2 (COVID-19) RNA [Presence] in Respiratory specimen by EDWARD with probe 
[id number] 10:06:03





             Test Item    Value        Reference Range Interpretation Comments

 

             SARS-CoV-2 (COVID-19) RNA Not detected Not-Detected              



             [Presence] in Respiratory                                        



             specimen by EDWARD with probe                                        



             detection (test code = 78805-8)                                    

    



SARS-CoV-2 (COVID-19) RNA [Presence] in Respiratory specimen by EDWARD with probe 
[id number] 05:11:58





             Test Item    Value        Reference Range Interpretation Comments

 

             SARS-CoV-2 (COVID-19) RNA Not detected Not-Detected              



             [Presence] in Respiratory                                        



             specimen by EDWARD with probe                                        



             detection (test code = 96862-8)                                    

    



SARS-CoV-2 (COVID-19) RNA [Presence] in Respiratory specimen by EDWARD with probe 
[id number] 03:34:16





             Test Item    Value        Reference Range Interpretation Comments

 

             SARS-CoV-2 (COVID-19) RNA Not detected Not-Detected              



             [Presence] in Respiratory                                        



             specimen by EDWARD with probe                                        



             detection (test code = 25367-3)                                    

    



SARS-CoV-2 (COVID-19) RNA [Presence] in Respiratory specimen by EDWARD with probe 
[id number] 10:06:41





             Test Item    Value        Reference Range Interpretation Comments

 

             SARS-CoV-2 (COVID-19) RNA Not detected Not-Detected              



             [Presence] in Respiratory                                        



             specimen by EDWARD with probe                                        



             detection (test code = 88154-8)                                    

    



LIPIDS2020-10-14 12:33:00





             Test Item    Value        Reference Range Interpretation Comments

 

             Chol (test code = Chol) 129                                    



[facility]





             Test Item    Value        Reference Range Interpretation Comments

 

             HDL (test code = HDL) 37                                     



[facility]





             Test Item    Value        Reference Range Interpretation Comments

 

             Trig (test code = Trig) 76                                     



[facility]





             Test Item    Value        Reference Range Interpretation Comments

 

             LDL (Calculated) (test code = LDL 76                               

      



             (Calculated))                                        



[facility]





             Test Item    Value        Reference Range Interpretation Comments

 

             CHD Risk (test code = CHD Risk) 3.5                                

    



[facility]





             Test Item    Value        Reference Range Interpretation Comments

 

             Non HDL Chol (test code = Non HDL Chol) 92                         

            



[facility] [employer] [id number] 14:47:00





             Test Item    Value        Reference Range Interpretation Comments

 

             Vitamin D, 25-OH, Total (test code = 37                      

         



             Vitamin D, 25-OH, Total)                                        



[facility] [employer] [id number] 14:47:00





             Test Item    Value        Reference Range Interpretation Comments

 

             U Creat mg/dL (test code = U Creat 114                       

       



             mg/dL)                                              



[facility] [email or web address] [id number] 14:47:00





             Test Item    Value        Reference Range Interpretation Comments

 

             U Prot/Creat (test code = U Prot/Creat) 430                  

            



[facility] [employer] [id number] 14:47:00





             Test Item    Value        Reference Range Interpretation Comments

 

             U Prot/Creat (test code = U 0.430 1      0.021-0.161               



             Prot/Creat)                                         



[facility]2020-08-06 14:47:00





             Test Item    Value        Reference Range Interpretation Comments

 

             U Protein (test code = U Protein) 49           5-24                

      



[place] [id number] 14:47:00





             Test Item    Value        Reference Range Interpretation Comments

 

             Glucose Lvl (test code = Glucose Lvl) 103          65-99           

          



[place] [id number] 14:47:00





             Test Item    Value        Reference Range Interpretation Comments

 

             BUN (test code = BUN) 24           7-25                      



[place] 846910-08-06 14:47:00





             Test Item    Value        Reference Range Interpretation Comments

 

             Creatinine Lvl (test code = Creatinine 1.32         0.50-0.99      

           



             Lvl)                                                



[place] 846910-08-06 14:47:00





             Test Item    Value        Reference Range Interpretation Comments

 

             eGFR NON-AFR. AMERICAN (test code = 43                             

        



             eGFR NON-AFR. AMERICAN)                                        



[facility]2020-08-06 14:47:00





             Test Item    Value        Reference Range Interpretation Comments

 

             eGFR  (test code = eGFR 50                         

            



             )                                        



[place] 846910-08-06 14:47:00





             Test Item    Value        Reference Range Interpretation Comments

 

             B/C Ratio (test code = B/C Ratio) 18           6-22                

      



[place] 846910-08-06 14:47:00





             Test Item    Value        Reference Range Interpretation Comments

 

             Sodium Lvl (test code = Sodium Lvl) 138          135-146           

        



[place] 846910-08-06 14:47:00





             Test Item    Value        Reference Range Interpretation Comments

 

             Potassium Lvl (test code = Potassium 4.4          3.5-5.3          

         



             Lvl)                                                



[place] 846910-08-06 14:47:00





             Test Item    Value        Reference Range Interpretation Comments

 

             Chloride Lvl (test code = Chloride Lvl) 102                  

            



[place] 846910-08-06 14:47:00





             Test Item    Value        Reference Range Interpretation Comments

 

             CO2 (test code = CO2) 30           20-32                     



[place] 846910-08-06 14:47:00





             Test Item    Value        Reference Range Interpretation Comments

 

             Calcium Lvl (test code = Calcium Lvl) 9.4          8.6-10.4        

          



[place] 846910-08-06 14:47:00





             Test Item    Value        Reference Range Interpretation Comments

 

             Phosphorus (test code = Phosphorus) 4.8          2.5-4.5           

        



[facility]2020-08-06 14:47:00





             Test Item    Value        Reference Range Interpretation Comments

 

             Albumin Lvl (test code = Albumin Lvl) 3.9          3.6-5.1         

          



[facility]170[facility] 14[facility]47:00





             Test Item    Value        Reference Range Interpretation Comments

 

             Plt Count Estimated (test code = DECREASED                         

     



             Plt Count Estimated)                                        



[facility]LqlmxmnFBFZZFGCJF3599-72-72 14:47:00





             Test Item    Value        Reference Range Interpretation Comments

 

             WBC X 10x3 (test code = WBC X 10x3) 7.2          3.8-10.8          

        



[facility] HospitalJkacvrsLFQZADRWRO7140-79-25 14:47:00





             Test Item    Value        Reference Range Interpretation Comments

 

             RBC X 10x6 (test code = RBC X 10x6) 3.71         3.80-5.10         

        



[facility] HospitalCerypnpDETSTYIZDC0548-09-76 14:47:00





             Test Item    Value        Reference Range Interpretation Comments

 

             Hgb (test code = Hgb) 10.7         11.7-15.5                 



[facility] HospitalNcugynaTLAXKOHPVN9425-47-22 14:47:00





             Test Item    Value        Reference Range Interpretation Comments

 

             Hct (test code = Hct) 33.9         35.0-45.0                 



[facility] HospitalOwxqzsmPLRZCZAXGK8562-62-86 14:47:00





             Test Item    Value        Reference Range Interpretation Comments

 

             MCV (test code = MCV) 91.4         80.0-100.0                



[facility] HospitalWmrmzmlOSEYKMWYWW4481-66-29 14:47:00





             Test Item    Value        Reference Range Interpretation Comments

 

             MCH (test code = MCH) 28.8 pg      27.0-33.0                 



[facility] HospitalAijbsljUPAKSWZPDC2377-09-99 14:47:00





             Test Item    Value        Reference Range Interpretation Comments

 

             MCHC (test code = MCHC) 31.6         32.0-36.0                 



[facility] HospitalPcswfcfFPREKXLRNG2527-38-42 14:47:00





             Test Item    Value        Reference Range Interpretation Comments

 

             RDW (test code = RDW) 13.9         11.0-15.0                 



[facility] HospitalFoauuxpBLGSPBYCDR7162-77-30 14:47:00





             Test Item    Value        Reference Range Interpretation Comments

 

             Platelet (test code = Platelet) 110          140-400               

    



[facility] HospitalPlnjcqvFMAXUGSKIJ1405-45-09 14:47:00





             Test Item    Value        Reference Range Interpretation Comments

 

             MPV (test code = MPV) 11.7         7.5-12.5                  



[place] 392640-08-06 14:47:00





             Test Item    Value        Reference Range Interpretation Comments

 

             Neutrophils # (test code = Neutrophils 5414         3544-2441      

           



             #)                                                  



[facility]BP[facility]GU[facility]75[facility]





             Test Item    Value        Reference Range Interpretation Comments

 

             Lymphocytes # (test code = Lymphocytes 1152         850-3900       

           



             #)                                                  



[facility]cpomwTQWYJZWLOY1114-21-26 14:47:00





             Test Item    Value        Reference Range Interpretation Comments

 

             Monocytes # (test code = Monocytes #) 461          200-950         

          



[facility]VvdwomvTTSFHABGRL2600-59-59 14:47:00





             Test Item    Value        Reference Range Interpretation Comments

 

             Eosinophils # (test code = Eosinophils 122                   

           



             #)                                                  



[facility]oniaTtrvfiqVHTRIWIEFZ8927-76-28 14:47:00





             Test Item    Value        Reference Range Interpretation Comments

 

             Basophils # (test code 50           See_Comment                [Aut

omated message] The



             = Basophils #)                                        system which 

generated



                                                                 this result tra

nsmitted



                                                                 reference range

: <=200.



                                                                 The reference r

cheyenne was



                                                                 not used to int

erpret



                                                                 this result as



                                                                 normal/abnormal

.



[facility]oniaFnvxsjxKVHUOSFBTW5844-90-35 14:47:00





             Test Item    Value        Reference Range Interpretation Comments

 

             Segs (test code = Segs) 75.2                                   



[facility]oniaLadghxuKCWIOWKGTB5467-39-33 14:47:00





             Test Item    Value        Reference Range Interpretation Comments

 

             Lymphocytes (test code = Lymphocytes) 16.0                         

          



[facility]oniaYkvfnaoPMCYNRTCAL9521-90-13 14:47:00





             Test Item    Value        Reference Range Interpretation Comments

 

             Monocytes (test code = Monocytes) 6.4                              

      



[facility]oniaIefhltgQUULVQVSVM5218-37-44 14:47:00





             Test Item    Value        Reference Range Interpretation Comments

 

             Eosinophils (test code = Eosinophils) 1.7                          

          



[facility]of[facility]UHXHFIFCNB0407[facility]80-55 14:47:00





             Test Item    Value        Reference Range Interpretation Comments

 

             Basophils (test code = Basophils) 0.7                              

      



[facility] [id number] 14:47:00





             Test Item    Value        Reference Range Interpretation Comments

 

             Result 2 (Urine Culture) See Result Comment                        

   



             (test code = Result 2                                        



             (Urine Culture))                                        



[facility] [id number] 14:47:00





             Test Item    Value        Reference Range Interpretation Comments

 

             UA Color (test code = UA Color) YELLOW                             

    



[facility] [id number] 14:47:00





             Test Item    Value        Reference Range Interpretation Comments

 

             UA Turbidity (test code = UA CLOUDY                                

 



             Turbidity)                                          



[facility] [id number] 14:47:00





             Test Item    Value        Reference Range Interpretation Comments

 

             UA Spec Grav (test code = UA Spec 1.017 1      1.001-1.035         

      



             Grav)                                               



[facility] [id number] 14:47:00





             Test Item    Value        Reference Range Interpretation Comments

 

             UA pH (test code = UA pH) 5.5 1        5.0-8.0                   



[facility] [id number] 14:47:00





             Test Item    Value        Reference Range Interpretation Comments

 

             UA Glucose (test code = UA Glucose) NEGATIVE                       

        



[facility] [id number] 14:47:00





             Test Item    Value        Reference Range Interpretation Comments

 

             UA Bili (test code = UA Bili) NEGATIVE                             

  



[facility] [id number] 14:47:00





             Test Item    Value        Reference Range Interpretation Comments

 

             UA Ketones (test code = UA Ketones) NEGATIVE                       

        



[facility] [id number] 14:47:00





             Test Item    Value        Reference Range Interpretation Comments

 

             UA Blood (test code = UA Blood) 1+                                 

    



[facility] [id number] 14:47:00





             Test Item    Value        Reference Range Interpretation Comments

 

             UA Protein (test code = UA Protein) 1+                             

        



[facility] [id number] 14:47:00





             Test Item    Value        Reference Range Interpretation Comments

 

             UA Nitrite (test code = UA Nitrite) POSITIVE                       

        



[facility] [id number] 14:47:00





             Test Item    Value        Reference Range Interpretation Comments

 

             UA Leuk Est (test code = UA Leuk Est) 2+                           

          



[facility] [id number] 14:47:00





             Test Item    Value        Reference Range Interpretation Comments

 

             UA WBC (test code = UA WBC) > OR = 60                              



[facility] [id number] 14:47:00





             Test Item    Value        Reference Range Interpretation Comments

 

             UA RBC (test code = UA RBC) 0-2                                    



[facility] [id number] 14:47:00





             Test Item    Value        Reference Range Interpretation Comments

 

             UA Sq Epi (test code = UA Sq Epi) NONE SEEN                        

      



[facility] [id number] 14:47:00





             Test Item    Value        Reference Range Interpretation Comments

 

             UA Bacteria (test code = UA Bacteria) MANY                         

          



[facility] [id number] 14:47:00





             Test Item    Value        Reference Range Interpretation Comments

 

             UA Hyal Cast (test code = UA Hyal NONE SEEN                        

      



             Cast)                                               



[facility] [id number] 14:47:00





             Test Item    Value        Reference Range Interpretation Comments

 

             UA Reflex (test code CULTURE INDICATED -                           



             = UA Reflex) RESULTS TO FOLLOW                           



[facility] [id number] 14:47:00





             Test Item    Value        Reference Range Interpretation Comments

 

             U Creat mg/dL (test code = U Creat 114                       

       



             mg/dL)                                              



[facility] [id number] 14:47:00





             Test Item    Value        Reference Range Interpretation Comments

 

             U Alb (test code = U Alb) 16.7                                   



[facility] [id number] 14:47:00





             Test Item    Value        Reference Range Interpretation Comments

 

             U Alb/Crea (test code = U Alb/Crea) 146                            

        



[facility] PROTEIN ELECTROPHORESIS-24HR [id number] 08:01:00





             Test Item    Value        Reference Range Interpretation Comments

 

             CREATININE, 24 HOUR 1.45 g/24 h  0.50-2.15                 



             URINE (test code =                                        



             82396714)                                           

 

             PROTEIN/CREATININE 292 mg/g creat < OR = 114   H            



             RATION (test code =                                        



             07222588)                                           

 

             PROTEIN, TOTAL 24 HR  mg/24 h  <150         H            TEST

 PERFORMED



             (test code = 10340992)                                        AT:QU

EST



                                                                 [facility]

IN



                                                                 [address], [place]



                                                                 [id number] FELIX MD

 

             ALBUMIN (test code = 35 %                                   



             42632738)                                           

 

             ALPHA-1-GLOBULINS (test 10 %                                   



             code = 95880933)                                        

 

             ALPHA-2-GLOBULINS (test 12 %                                   



             code = 33036475)                                        

 

             BETA GLOBULINS (test 25 %                                   



             code = 61091624)                                        

 

             GAMMA GLOBULINS (test 18 %                                   



             code = 64937259)                                        

 

             INTERPRETATION (test                                        Albumin

 and



             code = 10463894)                                        various aba

bulin



                                                                 fractions



                                                                 detected on



                                                                 proteinelectrop

ho



                                                                 resis. No



                                                                 abnormal protei

n



                                                                 bands



                                                                 (Bence-Jonespro

te



                                                                 inuria)



                                                                 detected.TEST



                                                                 PERFORMED



                                                                 AT:[facility]

IN



                                                                 [address]



                                                                 [id number] FELIX MD



NEUTROPHIL CYTOPLASMIC EV--15-21 05:19:00





             Test Item    Value        Reference Range Interpretation Comments

 

             ANCA SCREEN (test NEGATIVE     NEGATIVE                  ANCA Scree

n includes



             code = 65985496)                                        evaluation 

for p-ANCA,



                                                                 c-ANCA andatypi

tayla p-ANCA.



                                                                 A positive ANCA

 screen



                                                                 reflexes to tit

erand



                                                                 pattern(s), e.g

.,



                                                                 cytoplasmic pat

tern



                                                                 (c-ANCA),perinu

clear



                                                                 pattern (p-ANCA

), or



                                                                 atypical p-ANCA



                                                                 pattern.c-ANCA 

and p-ANCA



                                                                 are observed in

 vasculitis,



                                                                 whereasatypical

 p-ANCA is



                                                                 observed in IBD



                                                                 (Inflammatory



                                                                 BowelDisease). 

Atypical



                                                                 p-ANCA is detec

kolby in about



                                                                 55% to 80% ofpa

tients with



                                                                 ulcerative coli

tis but only



                                                                 5% to 25% ofpat

ients with



                                                                 Crohn's disease

.TEST



                                                                 PERFORMED AT:[employer]



                                                                 [facility]



                                                                 [id number] [place], [place]



                                                                 [id number] MARIEE MD,PHD

,RAMILA



COMPREHENSIVE METABOLIC [id number] 06:25:00





             Test Item    Value        Reference Range Interpretation Comments

 

             GLUCOSE (test code = 06D) 115 mg/dL           H            

 

             SODIUM (test code = 01A) 137 mmol/L   136-145                   

 

             POTASSIUM (test code = 01B) 3.3 mmol/L   3.6-5.1      L            

 

             CHLORIDE (test code = 04A) 97 mmol/L           L            

 

             CO2 (test code = 02A) 32 mmol/L    22-32                     

 

             ANION GAP (test code = ANG) 11.3 mmol/L                            

 

             BUN (test code = 05D) 36 mg/dL     7-18         H            

 

             CREATININE (test code = 03E) 1.4 mg/dL    0.4-1.1      H           

 

 

             BUN/CREA (test code = BCR) 25           12-20        H            

 

             CALCIUM (test code = 09D) 8.8 mg/dL    8.3-9.5                   

 

             BILI TOTAL (test code = 11A) 1.2 mg/dL    0.2-1.0      H           

 

 

             PROTEIN (test code = 07D) 6.5 g/dL     6.4-8.2                   

 

             ALBUMIN (test code = 08D) 2.9 g/dL     3.5-4.8      L            

 

             GLOBULIN (test code = GLB) 3.6 g/dL     1.5-3.8                   

 

             ALB/GLOB (test code = AGRR) 0.8          1.0-2.6      L            

 

             ALK PHOS (test code = 35A) 97 IU/L                          

 

             AST (test code = 30A) 15 IU/L      <=42                      

 

             ALT (test code = 31A) 35 IU/L      <=78                      



CBC (INCLUDES AUTOMATED DIFFERENTIAL)2019 06:06:00





             Test Item    Value        Reference Range Interpretation Comments

 

             WBC (test code = WBC) 6.9 10\S\3/uL 4.5-11.0                  

 

             RBC (test code = RBC) 3.56 10\S\6/uL 4.20-5.60    L            

 

             HGB (test code = HBG) 10.4 g/dL    12.0-15.5    L            

 

             HCT (test code = HCT) 32.1 %       35.0-44.0    L            

 

             MCV (test code = MCV) 90.2 fL      81.0-99.0                 

 

             MCH (test code = MCH) 29.2 pg      27.0-31.0                 

 

             MCHC (test code = MCHC) 32.4 g/dL    32.0-36.0                 

 

             RDW (test code = RDW) 15.0 %       11.5-14.5    H            

 

             PLT (test code = PLT) 158 10\S\3/uL 130-400                   

 

             MPV (test code = MPV) 10.7 fL      9.4-12.4                  

 

             NEUTROP # (test code = NE#) 4.4 10\S\3/uL 1.6-8.0                  

 

 

             LYMPH # (test code = LY#) 1.8 10\S\3/uL 1.1-3.5                   

 

             MONOCYTE # (test code = MO#) 0.5 10\S\3/uL 0.0-1.1                 

  

 

             EOSINOPH # (test code = EO#) 0.2 10\S\3/uL 0.0-0.7                 

  

 

             BASOPHIL # (test code = BA#) 0.0 10\S\3/uL 0.0-0.3                 

  

 

             IG # (test code = IG#) 0.03 10\S\3/uL 0.00-0.06                 

 

             NRBC # (test code = NRBC#) 0.00 10\S\3/uL 0.00-0.01                

 

 

             NEUTROPH % (test code = NE%) 64.0 %       35.0-73.0                

 

 

             LYMPH % (test code = LY%) 26.1 %       20.0-55.0                 

 

             MONO % (test code = MO%) 6.5 %        2.5-10.0                  

 

             EOSINOPH % (test code = EO%) 2.6 %        0.0-5.0                  

 

 

             BASOPHIL % (test code = BA%) 0.4 %        0.0-2.0                  

 

 

             IG % (test code = IG%) 0.4 %        0.0-0.8                   

 

             NRBC% (test code = NRBC%) 0.0 %        0.0-0.2                   

 

             MANDIFF (test code = MDIFF) NO           NO                        

 

             RBC MORPH (test code = RBCMOR)              NORMAL                 

   



URINE [id number] 09:53:00





             Test Item    Value        Reference Range Interpretation Comments

 

             Isolate 1 (test code = Proteus mirabilis              A            



             ISO1)                                               

 

             ampicillin (test code = am)  ug/mL                    S            

 

             ampicillin/sulbactam (test  ug/mL                    S            



             code = ams)                                         

 

             piperacillin/tazobactam  ug/mL                    S            



             (test code = tzp)                                        

 

             ceftazidime (test code =  ug/mL                    S            



             jeannine)                                                

 

             ceftriaxone1 (test code =  ug/mL                    S            



             ctr)                                                

 

             cefepime (test code = fep)  ug/mL                    S            

 

             aztreonam (test code = azm)  ug/mL                    S            

 

             ertapenem (test code = etp)  ug/mL                    S            

 

             meropenem (test code = mem)  ug/mL                    S            

 

             gentamicin (test code = gm)  ug/mL                    S            

 

             tobramycin (test code =  ug/mL                    S            



             tob)                                                

 

             levofloxacin (test code =  ug/mL                    S            



             lev)                                                

 

             trimethoprim/sulfamethoxazo  ug/mL                    S            



             le (test code = sxt)                                        



PARTHYROID HORMONE (INTACT)2019 08:24:00





             Test Item    Value        Reference Range Interpretation Comments

 

             PTH INTACT (test code 166.6 pg/mL  18.4-80.1    H            



             = A85)                                              

 

             Ref Range Change ***** Please note the                           



             (test code = REF change in reference                           



             RANGE)       range ***                              



VITAMIN D TOTAL 25 (OH)2019 07:15:00





             Test Item    Value        Reference Range Interpretation Comments

 

             VITAMIN D 25(OH) (test code = VD) 36.0 ng/mL   30.0-100.0          

      



SERUM PROTEIN [id number] 06:20:00





             Test Item    Value        Reference Range Interpretation Comments

 

             PROTEIN TOTAL (test 5.7 g/dL     6.1-8.1      L            TEST PER

FORMED AT:[facility]



             code = 98498788)                                        [facility]

[id number]



                                                                 [facility], [place]



                                                                 [id number] FELIX MD

 

             ALBUMIN (test code = 3.2 g/dL     3.8-4.8      L            



             [id number])                                           

 

             ALPHA-1-GLOBULINS 0.4 g/dL     0.2-0.3      H            



             (test code =                                        



             35093108)                                           

 

             ALPHA-2-GLOBULINS 0.8 g/dL     0.5-0.9                   



             (test code =                                        



             71028085)                                           

 

             BETA 1 GLOBULIN (test 0.5 g/dL     0.4-0.6                   



             code = [id number])                                        

 

             BETA 2 GLOBULIN (test 0.2 g/dL     0.2-0.5                   



             code = 48686026)                                        

 

             GAMMA GLOBULINS (test 0.6 g/dL     0.8-1.7      L            



             code = [id number])                                        

 

             INTERPRETATION (test                                        Pattern

 consistent with



             code = [id number])                                        an acute ph

ase



                                                                 reactionConsist

ent with



                                                                 hypogammaglobul

inemia.



                                                                 Serum free ligh

tchains



                                                                 or urine immuno

fixation



                                                                 should be consi

dered



                                                                 ifplasma cell d

yscrasias



                                                                 are a possible



                                                                 clinicaldiagnos

is.TEST



                                                                 PERFORMED AT:[facility]

[facility]



                                                                 [facility]

[id number]



                                                                 [facility], [place]



                                                                 [id number] FELIX MD



[id number] 06:13:00





             Test Item    Value        Reference Range Interpretation Comments

 

             MAGNESIUM (test code = 48A) 1.7 mg/dL    1.8-2.4      L            



COMPREHENSIVE METABOLIC [id number] 06:13:00





             Test Item    Value        Reference Range Interpretation Comments

 

             GLUCOSE (test code = 06D) 110 mg/dL           H            

 

             SODIUM (test code = 01A) 140 mmol/L   136-145                   

 

             POTASSIUM (test code = 01B) 3.1 mmol/L   3.6-5.1      L            

 

             CHLORIDE (test code = 04A) 96 mmol/L           L            

 

             CO2 (test code = 02A) 34 mmol/L    22-32        H            

 

             ANION GAP (test code = ANG) 13.1 mmol/L                            

 

             BUN (test code = 05D) 33 mg/dL     7-18         H            

 

             CREATININE (test code = 03E) 1.5 mg/dL    0.4-1.1      H           

 

 

             BUN/CREA (test code = BCR) 22           12-20        H            

 

             CALCIUM (test code = 09D) 9.1 mg/dL    8.3-9.5                   

 

             BILI TOTAL (test code = 11A) 1.4 mg/dL    0.2-1.0      H           

 

 

             PROTEIN (test code = 07D) 7.0 g/dL     6.4-8.2                   

 

             ALBUMIN (test code = 08D) 3.2 g/dL     3.5-4.8      L            

 

             GLOBULIN (test code = GLB) 3.8 g/dL     1.5-3.8                   

 

             ALB/GLOB (test code = AGRR) 0.8          1.0-2.6      L            

 

             ALK PHOS (test code = 35A) 111 IU/L                         

 

             AST (test code = 30A) 18 IU/L      <=42                      

 

             ALT (test code = 31A) 43 IU/L      <=78                      



PHOSPHORUS (P04)2019 06:13:00





             Test Item    Value        Reference Range Interpretation Comments

 

             PHOSPHORUS (test code = 43D) 4.0 mg/dL    2.7-4.6                  

 



CBC (INCLUDES AUTOMATED DIFFERENTIAL)2019 05:48:00





             Test Item    Value        Reference Range Interpretation Comments

 

             WBC (test code = WBC) 9.4 10\S\3/uL 4.5-11.0                  

 

             RBC (test code = RBC) 3.73 10\S\6/uL 4.20-5.60    L            

 

             HGB (test code = HBG) 10.8 g/dL    12.0-15.5    L            

 

             HCT (test code = HCT) 33.8 %       35.0-44.0    L            

 

             MCV (test code = MCV) 90.6 fL      81.0-99.0                 

 

             MCH (test code = MCH) 29.0 pg      27.0-31.0                 

 

             MCHC (test code = MCHC) 32.0 g/dL    32.0-36.0                 

 

             RDW (test code = RDW) 15.1 %       11.5-14.5    H            

 

             PLT (test code = PLT) 189 10\S\3/uL 130-400                   

 

             MPV (test code = MPV) 11.8 fL      9.4-12.4                  

 

             NEUTROP # (test code = NE#) 7.0 10\S\3/uL 1.6-8.0                  

 

 

             LYMPH # (test code = LY#) 1.6 10\S\3/uL 1.1-3.5                   

 

             MONOCYTE # (test code = MO#) 0.7 10\S\3/uL 0.0-1.1                 

  

 

             EOSINOPH # (test code = EO#) 0.1 10\S\3/uL 0.0-0.7                 

  

 

             BASOPHIL # (test code = BA#) 0.0 10\S\3/uL 0.0-0.3                 

  

 

             IG # (test code = IG#) 0.06 10\S\3/uL 0.00-0.06                 

 

             NRBC # (test code = NRBC#) 0.00 10\S\3/uL 0.00-0.01                

 

 

             NEUTROPH % (test code = NE%) 74.5 %       35.0-73.0    H           

 

 

             LYMPH % (test code = LY%) 16.5 %       20.0-55.0    L            

 

             MONO % (test code = MO%) 7.0 %        2.5-10.0                  

 

             EOSINOPH % (test code = EO%) 1.1 %        0.0-5.0                  

 

 

             BASOPHIL % (test code = BA%) 0.3 %        0.0-2.0                  

 

 

             IG % (test code = IG%) 0.6 %        0.0-0.8                   

 

             NRBC% (test code = NRBC%) 0.0 %        0.0-0.2                   

 

             MANDIFF (test code = MDIFF) NO           NO                        

 

             RBC MORPH (test code = RBCMOR)              NORMAL                 

   



COMPREHENSIVE METABOLIC [id number] 05:30:00





             Test Item    Value        Reference Range Interpretation Comments

 

             GLUCOSE (test code = 06D) 122 mg/dL           H            

 

             SODIUM (test code = 01A) 140 mmol/L   136-145                   

 

             POTASSIUM (test code = 01B) 3.8 mmol/L   3.6-5.1                   

 

             CHLORIDE (test code = 04A) 100 mmol/L                       

 

             CO2 (test code = 02A) 32 mmol/L    22-32                     

 

             ANION GAP (test code = ANG) 11.8 mmol/L                            

 

             BUN (test code = 05D) 29 mg/dL     7-18         H            

 

             CREATININE (test code = 03E) 1.5 mg/dL    0.4-1.1      H           

 

 

             BUN/CREA (test code = BCR) 20           12-20                     

 

             CALCIUM (test code = 09D) 9.0 mg/dL    8.3-9.5                   

 

             BILI TOTAL (test code = 11A) 1.3 mg/dL    0.2-1.0      H           

 

 

             PROTEIN (test code = 07D) 7.1 g/dL     6.4-8.2                   

 

             ALBUMIN (test code = 08D) 3.5 g/dL     3.5-4.8                   

 

             GLOBULIN (test code = GLB) 3.6 g/dL     1.5-3.8                   

 

             ALB/GLOB (test code = AGRR) 1.0          1.0-2.6                   

 

             ALK PHOS (test code = 35A) 119 IU/L                         

 

             AST (test code = 30A) 22 IU/L      <=42                      

 

             ALT (test code = 31A) 49 IU/L      <=78                      



CBC (INCLUDES AUTOMATED DIFFERENTIAL)2019 05:18:00





             Test Item    Value        Reference Range Interpretation Comments

 

             WBC (test code = WBC) 9.0 10\S\3/uL 4.5-11.0                  

 

             RBC (test code = RBC) 3.94 10\S\6/uL 4.20-5.60    L            

 

             HGB (test code = HBG) 11.4 g/dL    12.0-15.5    L            

 

             HCT (test code = HCT) 35.8 %       35.0-44.0                 

 

             MCV (test code = MCV) 90.9 fL      81.0-99.0                 

 

             MCH (test code = MCH) 28.9 pg      27.0-31.0                 

 

             MCHC (test code = MCHC) 31.8 g/dL    32.0-36.0    L            

 

             RDW (test code = RDW) 14.8 %       11.5-14.5    H            

 

             PLT (test code = PLT) 164 10\S\3/uL 130-400                   

 

             MPV (test code = MPV) 11.6 fL      9.4-12.4                  

 

             NEUTROP # (test code = NE#) 6.8 10\S\3/uL 1.6-8.0                  

 

 

             LYMPH # (test code = LY#) 1.4 10\S\3/uL 1.1-3.5                   

 

             MONOCYTE # (test code = MO#) 0.6 10\S\3/uL 0.0-1.1                 

  

 

             EOSINOPH # (test code = EO#) 0.1 10\S\3/uL 0.0-0.7                 

  

 

             BASOPHIL # (test code = BA#) 0.0 10\S\3/uL 0.0-0.3                 

  

 

             IG # (test code = IG#) 0.06 10\S\3/uL 0.00-0.06                 

 

             NRBC # (test code = NRBC#) 0.00 10\S\3/uL 0.00-0.01                

 

 

             NEUTROPH % (test code = NE%) 75.6 %       35.0-73.0    H           

 

 

             LYMPH % (test code = LY%) 15.9 %       20.0-55.0    L            

 

             MONO % (test code = MO%) 6.5 %        2.5-10.0                  

 

             EOSINOPH % (test code = EO%) 0.9 %        0.0-5.0                  

 

 

             BASOPHIL % (test code = BA%) 0.4 %        0.0-2.0                  

 

 

             IG % (test code = IG%) 0.7 %        0.0-0.8                   

 

             NRBC% (test code = NRBC%) 0.0 %        0.0-0.2                   

 

             MANDIFF (test code = MDIFF) NO           NO                        

 

             RBC MORPH (test code = RBCMOR)              NORMAL                 

   



URINALYSIS WITH [id number] 06:44:00





             Test Item    Value        Reference Range Interpretation Comments

 

             COLOR (test code = COLU) YELLOW       YELLOW                    

 

             CLARITY (test code = CLA) CLOUDY       CLEAR        A            

 

             GLUCOSE UR (test code = UA NEGATIVE     NEGATIVE                  



             GLUCOSE)                                            

 

             BILI UR (test code = BILE) NEGATIVE     NEGATIVE                  

 

             KETONES UR (test code = ACE) NEGATIVE     NEGATIVE                 

 

 

             SP GRAVITY (test code = SPGR) 1.014        1.005-1.030             

  

 

             PH UR (test code = PH) 6.0          4.5-8.0                   

 

             PROTEIN UR (test code = PU) TRACE        NEGATIVE     A            

 

             UROBIL UR (test code = UROQ) 0.2 EU/dL    0.2-1.0                  

 

 

             NITRITE UR (test code = NEGATIVE     NEGATIVE                  



             NITRITE)                                            

 

             BLOOD UR (test code = UA BLOOD) TRACE        NEGATIVE     A        

    

 

             LEUK ES UR (test code = LEUK) 2+           NEGATIVE     A          

  

 

             WBC UR (test code = UWBC) 12 /HPF      0-5          H            

 

             RBC UR (test code = URBC) 1 /HPF       0-2                       

 

             EPITH  UR (test code = UEPC) FEW /LPF     FEW                      

 

 

             BACTERIA UR (test code = UBACT) MODERATE /HPF NONE         A       

     

 

             CAST UR (test code = CAST)  /LPF        NONE                      

 

             CRYSTAL UR (test code = CRYU)  / LPF       NONE                    

  

 

             MUCUS UR (test code = MUC)  / HPF       NONE                      

 

             AMORPH UR (test code = YASMEEN)  / HPF       NONE                     

 

 

             TRICH UR (test code = UTRICH)  /HPF        NONE                    

  

 

             YEAST UR (test code = UY)  /HPF        NONE                      

 

             SPERM UR (test code = USPERM)  /HPF        NONE                    

  



COMPREHENSIVE METABOLIC [id number] 05:24:00





             Test Item    Value        Reference Range Interpretation Comments

 

             GLUCOSE (test code = 06D) 105 mg/dL           H            

 

             SODIUM (test code = 01A) 138 mmol/L   136-145                   

 

             POTASSIUM (test code = 01B) 4.0 mmol/L   3.6-5.1                   

 

             CHLORIDE (test code = 04A) 104 mmol/L                       

 

             CO2 (test code = 02A) 25 mmol/L    22-32                     

 

             ANION GAP (test code = ANG) 13.0 mmol/L                            

 

             BUN (test code = 05D) 29 mg/dL     7-18         H            

 

             CREATININE (test code = 03E) 1.5 mg/dL    0.4-1.1      H           

 

 

             BUN/CREA (test code = BCR) 19           12-20                     

 

             CALCIUM (test code = 09D) 8.4 mg/dL    8.3-9.5                   

 

             BILI TOTAL (test code = 11A) 0.7 mg/dL    0.2-1.0                  

 

 

             PROTEIN (test code = 07D) 6.2 g/dL     6.4-8.2      L            

 

             ALBUMIN (test code = 08D) 3.1 g/dL     3.5-4.8      L            

 

             GLOBULIN (test code = GLB) 3.1 g/dL     1.5-3.8                   

 

             ALB/GLOB (test code = AGRR) 1.0          1.0-2.6                   

 

             ALK PHOS (test code = 35A) 104 IU/L                         

 

             AST (test code = 30A) 22 IU/L      <=42                      

 

             ALT (test code = 31A) 42 IU/L      <=78                      



CBC (INCLUDES AUTOMATED DIFFERENTIAL)2019 05:07:00





             Test Item    Value        Reference Range Interpretation Comments

 

             WBC (test code = WBC) 8.6 10\S\3/uL 4.5-11.0                  

 

             RBC (test code = RBC) 3.72 10\S\6/uL 4.20-5.60    L            

 

             HGB (test code = HBG) 10.8 g/dL    12.0-15.5    L            

 

             HCT (test code = HCT) 33.7 %       35.0-44.0    L            

 

             MCV (test code = MCV) 90.6 fL      81.0-99.0                 

 

             MCH (test code = MCH) 29.0 pg      27.0-31.0                 

 

             MCHC (test code = MCHC) 32.0 g/dL    32.0-36.0                 

 

             RDW (test code = RDW) 14.7 %       11.5-14.5    H            

 

             PLT (test code = PLT) 152 10\S\3/uL 130-400                   

 

             MPV (test code = MPV) 11.4 fL      9.4-12.4                  

 

             NEUTROP # (test code = NE#) 6.2 10\S\3/uL 1.6-8.0                  

 

 

             LYMPH # (test code = LY#) 1.6 10\S\3/uL 1.1-3.5                   

 

             MONOCYTE # (test code = MO#) 0.5 10\S\3/uL 0.0-1.1                 

  

 

             EOSINOPH # (test code = EO#) 0.2 10\S\3/uL 0.0-0.7                 

  

 

             BASOPHIL # (test code = BA#) 0.1 10\S\3/uL 0.0-0.3                 

  

 

             IG # (test code = IG#) 0.03 10\S\3/uL 0.00-0.06                 

 

             NRBC # (test code = NRBC#) 0.00 10\S\3/uL 0.00-0.01                

 

 

             NEUTROPH % (test code = NE%) 72.8 %       35.0-73.0                

 

 

             LYMPH % (test code = LY%) 18.5 %       20.0-55.0    L            

 

             MONO % (test code = MO%) 5.8 %        2.5-10.0                  

 

             EOSINOPH % (test code = EO%) 2.0 %        0.0-5.0                  

 

 

             BASOPHIL % (test code = BA%) 0.6 %        0.0-2.0                  

 

 

             IG % (test code = IG%) 0.3 %        0.0-0.8                   

 

             NRBC% (test code = NRBC%) 0.0 %        0.0-0.2                   

 

             MANDIFF (test code = MDIFF) NO           NO                        

 

             RBC MORPH (test code = RBCMOR)              NORMAL                 

   



U/S KIDNEY (RENAL)2019 23:20:42LOCATION: [id number]: 63-year-old female 
who presents with acute nontraumatic kidney injury.COMMENT:Sonographic imaging 
of this patient's retroperitoneum was performed.The right kidney measures 9.9 x 
5.9 x 6.4 cm with a 13 mm cortical thickness. Acyst is seen in the upper pole 
measuring 23 x 19 x 19 mm, and a second cyst isseen in the lower pole measuring 
18 x 16 x 16 mm.The left kidney measures 9.4 x 5.3 x 5.6 cm with a 11 mm 
cortical thickness. Nocysts or masses are seen in the left kidney.Cortical 
echotexture of the kidneys otherwise is unremarkable.There is no evidence of 
hydronephrosis of either kidney.In the urinary bladder only the left urine jet 
was observed on the color flowstudy. The bladder otherwise is 
unremarkable.IMPRESSION:Cystic changes are seen in the upper pole and lower pole
ofthis patient'sright kidney. No gross abnormalities are seen elsewhere in 
either kidney.The urinary bladder is unremarkable. Only the left urine jet was 
observed onthe color flow study.TROPONIN -55-12 07:14:00





             Test Item    Value        Reference Range Interpretation Comments

 

             TROPONIN I (test code = A84) <0.015 ng/mL 0.000-0.045              

 



COMPREHENSIVE METABOLIC [id number] 05:28:00





             Test Item    Value        Reference Range Interpretation Comments

 

             GLUCOSE (test code = 06D) 110 mg/dL           H            

 

             SODIUM (test code = 01A) 138 mmol/L   136-145                   

 

             POTASSIUM (test code = 01B) 3.9 mmol/L   3.6-5.1                   

 

             CHLORIDE (test code = 04A) 106 mmol/L                       

 

             CO2 (test code = 02A) 24 mmol/L    22-32                     

 

             ANION GAP (test code = ANG) 11.9 mmol/L                            

 

             BUN (test code = 05D) 22 mg/dL     7-18         H            

 

             CREATININE (test code = 03E) 1.5 mg/dL    0.4-1.1      H           

 

 

             BUN/CREA (test code = BCR) 15           12-20                     

 

             CALCIUM (test code = 09D) 8.2 mg/dL    8.3-9.5      L            

 

             BILI TOTAL (test code = 11A) 0.6 mg/dL    0.2-1.0                  

 

 

             PROTEIN (test code = 07D) 6.0 g/dL     6.4-8.2      L            

 

             ALBUMIN (test code = 08D) 2.9 g/dL     3.5-4.8      L            

 

             GLOBULIN (test code = GLB) 3.1 g/dL     1.5-3.8                   

 

             ALB/GLOB (test code = AGRR) 0.9          1.0-2.6      L            

 

             ALK PHOS (test code = 35A) 96 IU/L                          

 

             AST (test code = 30A) 19 IU/L      <=42                      

 

             ALT (test code = 31A) 35 IU/L      <=78                      



CBC (INCLUDES AUTOMATED DIFFERENTIAL)2019 05:14:00





             Test Item    Value        Reference Range Interpretation Comments

 

             WBC (test code = WBC) 7.0 10\S\3/uL 4.5-11.0                  

 

             RBC (test code = RBC) 3.64 10\S\6/uL 4.20-5.60    L            

 

             HGB (test code = HBG) 10.6 g/dL    12.0-15.5    L            

 

             HCT (test code = HCT) 33.5 %       35.0-44.0    L            

 

             MCV (test code = MCV) 92.0 fL      81.0-99.0                 

 

             MCH (test code = MCH) 29.1 pg      27.0-31.0                 

 

             MCHC (test code = MCHC) 31.6 g/dL    32.0-36.0    L            

 

             RDW (test code = RDW) 14.9 %       11.5-14.5    H            

 

             PLT (test code = PLT) 145 10\S\3/uL 130-400                   

 

             MPV (test code = MPV) 11.4 fL      9.4-12.4                  

 

             NEUTROP # (test code = NE#) 4.2 10\S\3/uL 1.6-8.0                  

 

 

             LYMPH # (test code = LY#) 2.1 10\S\3/uL 1.1-3.5                   

 

             MONOCYTE # (test code = MO#) 0.5 10\S\3/uL 0.0-1.1                 

  

 

             EOSINOPH # (test code = EO#) 0.1 10\S\3/uL 0.0-0.7                 

  

 

             BASOPHIL # (test code = BA#) 0.0 10\S\3/uL 0.0-0.3                 

  

 

             IG # (test code = IG#) 0.04 10\S\3/uL 0.00-0.06                 

 

             NRBC # (test code = NRBC#) 0.00 10\S\3/uL 0.00-0.01                

 

 

             NEUTROPH % (test code = NE%) 59.7 %       35.0-73.0                

 

 

             LYMPH % (test code = LY%) 30.1 %       20.0-55.0                 

 

             MONO % (test code = MO%) 7.0 %        2.5-10.0                  

 

             EOSINOPH % (test code = EO%) 2.0 %        0.0-5.0                  

 

 

             BASOPHIL % (test code = BA%) 0.6 %        0.0-2.0                  

 

 

             IG % (test code = IG%) 0.6 %        0.0-0.8                   

 

             NRBC% (test code = NRBC%) 0.0 %        0.0-0.2                   

 

             MANDIFF (test code = MDIFF) NO           NO                        

 

             RBC MORPH (test code = RBCMOR)              NORMAL                 

   



COMPREHENSIVE METABOLIC PAN2019-08-15 04:58:00





             Test Item    Value        Reference Range Interpretation Comments

 

             GLUCOSE (test code = 06D) 112 mg/dL           H            

 

             SODIUM (test code = 01A) 143 mmol/L   136-145                   

 

             POTASSIUM (test code = 01B) 4.4 mmol/L   3.6-5.1                   

 

             CHLORIDE (test code = 04A) 108 mmol/L          H            

 

             CO2 (test code = 02A) 27 mmol/L    22-32                     

 

             ANION GAP (test code = ANG) 12.4 mmol/L                            

 

             BUN (test code = 05D) 19 mg/dL     7-18         H            

 

             CREATININE (test code = 03E) 1.4 mg/dL    0.4-1.1      H           

 

 

             BUN/CREA (test code = BCR) 14           12-20                     

 

             CALCIUM (test code = 09D) 8.5 mg/dL    8.3-9.5                   

 

             BILI TOTAL (test code = 11A) 0.9 mg/dL    0.2-1.0                  

 

 

             PROTEIN (test code = 07D) 6.2 g/dL     6.4-8.2      L            

 

             ALBUMIN (test code = 08D) 2.9 g/dL     3.5-4.8      L            

 

             GLOBULIN (test code = GLB) 3.3 g/dL     1.5-3.8                   

 

             ALB/GLOB (test code = AGRR) 0.9          1.0-2.6      L            

 

             ALK PHOS (test code = 35A) 95 IU/L                          

 

             AST (test code = 30A) 19 IU/L      <=42                      

 

             ALT (test code = 31A) 40 IU/L      <=78                      



CBC (INCLUDES AUTOMATED DIFFERENTIAL)2019-08-15 04:51:00





             Test Item    Value        Reference Range Interpretation Comments

 

             WBC (test code = WBC) 8.2 10\S\3/uL 4.5-11.0                  

 

             RBC (test code = RBC) 3.48 10\S\6/uL 4.20-5.60    L            

 

             HGB (test code = HBG) 10.3 g/dL    12.0-15.5    L            

 

             HCT (test code = HCT) 32.4 %       35.0-44.0    L            

 

             MCV (test code = MCV) 93.1 fL      81.0-99.0                 

 

             MCH (test code = MCH) 29.6 pg      27.0-31.0                 

 

             MCHC (test code = MCHC) 31.8 g/dL    32.0-36.0    L            

 

             RDW (test code = RDW) 15.5 %       11.5-14.5    H            

 

             PLT (test code = PLT) 163 10\S\3/uL 130-400                   

 

             MPV (test code = MPV) 11.8 fL      9.4-12.4                  

 

             NEUTROP # (test code = NE#) 5.3 10\S\3/uL 1.6-8.0                  

 

 

             LYMPH # (test code = LY#) 2.0 10\S\3/uL 1.1-3.5                   

 

             MONOCYTE # (test code = MO#) 0.6 10\S\3/uL 0.0-1.1                 

  

 

             EOSINOPH # (test code = EO#) 0.2 10\S\3/uL 0.0-0.7                 

  

 

             BASOPHIL # (test code = BA#) 0.0 10\S\3/uL 0.0-0.3                 

  

 

             IG # (test code = IG#) 0.03 10\S\3/uL 0.00-0.06                 

 

             NRBC # (test code = NRBC#) 0.00 10\S\3/uL 0.00-0.01                

 

 

             NEUTROPH % (test code = NE%) 65.5 %       35.0-73.0                

 

 

             LYMPH % (test code = LY%) 24.2 %       20.0-55.0                 

 

             MONO % (test code = MO%) 7.2 %        2.5-10.0                  

 

             EOSINOPH % (test code = EO%) 2.2 %        0.0-5.0                  

 

 

             BASOPHIL % (test code = BA%) 0.5 %        0.0-2.0                  

 

 

             IG % (test code = IG%) 0.4 %        0.0-0.8                   

 

             NRBC% (test code = NRBC%) 0.0 %        0.0-0.2                   

 

             MANDIFF (test code = MDIFF) NO           NO                        



CARDIAC [id number] 23:10:00





             Test Item    Value        Reference Range Interpretation Comments

 

             TROPONIN I (test code = A84) <0.015 ng/mL 0.000-0.045              

 



U/S VENOUS DOPPLER IVON LOW [id number] 22:23:14LOCATION: [id number]: 
63-year-old female who presents with bilateral leg swelling.COMMENT:    Sonogra
[facility] imaging of the venous anatomy in both of this patient's legs wasobtained 
utilizing grayscale, color-flow, and Doppler waveform imagingmodalities.The 
common femoral veins, superficial femoral veins, popliteal veins, 
posteriortibial veins, anterior tibial veins, and peroneal veins in both legs 
wereincluded in the study.The anatomy exhibits normal compressibility on 
grayscale study. No suspiciousfilling defects are seen on the color flow study. 
Appropriate waveform responseas are noted on the Doppler study during 
augmentation maneuvers and duringquiet respiration.    IMPRESSION:There is no 
sonographic evidence of venous thrombosis in either of thispatient's legs.
CARDIAC [id number] 16:50:00





             Test Item    Value        Reference Range Interpretation Comments

 

             TROPONIN I (test code = A84) <0.015 ng/mL 0.000-0.045              

 



BRAIN NATRIURETIC [id number] 14:39:00





             Test Item    Value        Reference Range Interpretation Comments

 

             proBNP (test code = PBNP) 1337 pg/mL   0-125        H            



THYROID PANEL/SCREEN (TSH)2019 12:05:00





             Test Item    Value        Reference Range Interpretation Comments

 

             TSH (test code = A57) 0.989 uIU/mL 0.358-3.740               



[id number] 12:02:00





             Test Item    Value        Reference Range Interpretation Comments

 

             CPK (test code = 32A) 98 IU/L                          



AMYLASE AND [id number] 12:02:00





             Test Item    Value        Reference Range Interpretation Comments

 

             AMYLASE (test code = 10A) 19 U/L              L            

 

             LIPASE (test code = 60A) 67 IU/L             L            



COMPREHENSIVE METABOLIC [id number] 12:02:00





             Test Item    Value        Reference Range Interpretation Comments

 

             GLUCOSE (test code = 06D) 109 mg/dL           H            

 

             SODIUM (test code = 01A) 142 mmol/L   136-145                   

 

             POTASSIUM (test code = 01B) 4.2 mmol/L   3.6-5.1                   

 

             CHLORIDE (test code = 04A) 109 mmol/L          H            

 

             CO2 (test code = 02A) 26 mmol/L    22-32                     

 

             ANION GAP (test code = ANG) 11.2 mmol/L                            

 

             BUN (test code = 05D) 16 mg/dL     7-18                      

 

             CREATININE (test code = 03E) 1.3 mg/dL    0.4-1.1      H           

 

 

             BUN/CREA (test code = BCR) 13           12-20                     

 

             CALCIUM (test code = 09D) 8.7 mg/dL    8.3-9.5                   

 

             BILI TOTAL (test code = 11A) 1.3 mg/dL    0.2-1.0      H           

 

 

             PROTEIN (test code = 07D) 6.5 g/dL     6.4-8.2                   

 

             ALBUMIN (test code = 08D) 3.2 g/dL     3.5-4.8      L            

 

             GLOBULIN (test code = GLB) 3.3 g/dL     1.5-3.8                   

 

             ALB/GLOB (test code = AGRR) 1.0          1.0-2.6                   

 

             ALK PHOS (test code = 35A) 101 IU/L                         

 

             AST (test code = 30A) 21 IU/L      <=42                      

 

             ALT (test code = 31A) 41 IU/L      <=78                      



TROPONIN -31-12 11:57:00





             Test Item    Value        Reference Range Interpretation Comments

 

             TROPONIN I (test code = A84) <0.015 ng/mL 0.000-0.045              

 



XR CHEST 1 VIEW [id number] 11:55:22EXAM: XR CHEST 1 VIEW 
PORTABLE.LOCATION: D4.HISTORY: [id number]: Chest pain.COMPARISON: Radiograph dated
2019.TECHNIQUE: Single AP view of the chest was obtained. FINDINGS:The 
heart is enlarged in size. Small left pleural effusion and left basilaropacities
are present. There is elevation of the right hemidiaphragm. No acuteosseous 
abnormality is identified.IMPRESSION:Small left pleural effusion with left 
basilar opacities, which may representatelectasis or infiltrates.Cardiomegaly.
PRO TIME AND [id number] 11:43:00





             Test Item    Value        Reference Range Interpretation Comments

 

             PT (test code = 13.4 s       9.8-13.6                  



             TT)                                                 

 

             INR (test code = 1.2                                    



             INR)                                                

 

             INRH (test code =      **** SUGGESTED                           



             INRH)        THERAPEUTIC RANGE FOR INR:                           



                          ****    2.5 - 3.5 **  For                           



                          Patients with Prosthetic                           



                          Valves or Patients with                           



                                          recurrent                           



                          Thromboembolic Events **                           



                          2.0 - 3.0 ** For Most Other                           



                          Applications **                           

 

             PTT (test code = 30.4 s       20.2-38.0                 



             PTT)                                                

 

             PTTH (test code = **** To monitor the                           



             PTTH)        effectiveness of heparin,                           



                          we offer the Anti-Xa                           



                          (Heparin Assay). It can be                           



                          used for either                           



                          unfractionated or LMW                           



                          Heparin. Order Code is                           



                          ANTI-XA ****                           



CBC (INCLUDES AUTOMATED DIFFERENTIAL)2019 11:36:00





             Test Item    Value        Reference Range Interpretation Comments

 

             WBC (test code = WBC) 7.6 10\S\3/uL 4.5-11.0                  

 

             RBC (test code = RBC) 3.53 10\S\6/uL 4.20-5.60    L            

 

             HGB (test code = HBG) 10.3 g/dL    12.0-15.5    L            

 

             HCT (test code = HCT) 33.5 %       35.0-44.0    L            

 

             MCV (test code = MCV) 94.9 fL      81.0-99.0                 

 

             MCH (test code = MCH) 29.2 pg      27.0-31.0                 

 

             MCHC (test code = MCHC) 30.7 g/dL    32.0-36.0    L            

 

             RDW (test code = RDW) 15.4 %       11.5-14.5    H            

 

             PLT (test code = PLT) 164 10\S\3/uL 130-400                   

 

             MPV (test code = MPV) 11.7 fL      9.4-12.4                  

 

             NEUTROP # (test code = NE#) 5.6 10\S\3/uL 1.6-8.0                  

 

 

             LYMPH # (test code = LY#) 1.4 10\S\3/uL 1.1-3.5                   

 

             MONOCYTE # (test code = MO#) 0.4 10\S\3/uL 0.0-1.1                 

  

 

             EOSINOPH # (test code = EO#) 0.1 10\S\3/uL 0.0-0.7                 

  

 

             BASOPHIL # (test code = BA#) 0.0 10\S\3/uL 0.0-0.3                 

  

 

             IG # (test code = IG#) 0.04 10\S\3/uL 0.00-0.06                 

 

             NRBC # (test code = NRBC#) 0.00 10\S\3/uL 0.00-0.01                

 

 

             NEUTROPH % (test code = NE%) 73.9 %       35.0-73.0    H           

 

 

             LYMPH % (test code = LY%) 18.0 %       20.0-55.0    L            

 

             MONO % (test code = MO%) 5.7 %        2.5-10.0                  

 

             EOSINOPH % (test code = EO%) 1.6 %        0.0-5.0                  

 

 

             BASOPHIL % (test code = BA%) 0.3 %        0.0-2.0                  

 

 

             IG % (test code = IG%) 0.5 %        0.0-0.8                   

 

             NRBC% (test code = NRBC%) 0.0 %        0.0-0.2                   

 

             MANDIFF (test code = MDIFF) NO           NO                        

 

             RBC MORPH (test code = RBCMOR)              NORMAL                 

   



CBC WITH  [id number] 15:29:00





             Test Item    Value        Reference Range Interpretation Comments

 

             WBC (test code = WBC) 9.7 10\S\3/uL 4.5-11.0                  

 

             RBC (test code = RBC) 3.73 10\S\6/uL 4.20-5.60    L            

 

             HGB (test code = HBG) 11.0 g/dL    12.0-15.5    L            

 

             HCT (test code = HCT) 34.1 %       35.0-44.0    L            

 

             MCV (test code = MCV) 91.4 fL      81.0-99.0                 

 

             MCH (test code = MCH) 29.5 pg      27.0-31.0                 

 

             MCHC (test code = MCHC) 32.3 g/dL    32.0-36.0                 

 

             RDW (test code = RDW) 14.2 %       11.5-14.5                 

 

             PLT (test code = PLT) 116 10\S\3/uL 130-400      L            

 

             MPV (test code = MPV) 11.8 fL      9.4-12.4                  

 

             NEUTROP # (test code = NE#) 7.9 10\S\3/uL 1.6-8.0                  

 

 

             LYMPH # (test code = LY#) 1.0 10\S\3/uL 1.1-3.5      L            

 

             MONOCYTE # (test code = 0.6 10\S\3/uL 0.0-1.1                   



             MO#)                                                

 

             EOSINOPH # (test code = 0.1 10\S\3/uL 0.0-0.7                   



             EO#)                                                

 

             BASOPHIL # (test code = 0.0 10\S\3/uL 0.0-0.3                   



             BA#)                                                

 

             IG # (test code = IG#) 0.04 10\S\3/uL 0.00-0.06                 

 

             NRBC # (test code = NRBC#) 0.00 10\S\3/uL 0.00-0.01                

 

 

             NEUTROPH % (test code = 81.6 %       35.0-73.0    H            



             NE%)                                                

 

             LYMPH % (test code = LY%) 10.3 %       20.0-55.0    L            

 

             MONO % (test code = MO%) 6.4 %        2.5-10.0                  

 

             EOSINOPH % (test code = 1.0 %        0.0-5.0                   



             EO%)                                                

 

             BASOPHIL % (test code = 0.3 %        0.0-2.0                   



             BA%)                                                

 

             IG % (test code = IG%) 0.4 %        0.0-0.8                   

 

             NRBC% (test code = NRBC%) 0.0 %        0.0-0.2                   

 

             PLT EST (test code = ADEQUATE     ADEQUATE                  



             PLTEST)                                             

 

             PLT MORPH (test code = NORMAL (1.5-3 um) NORMAL                    



             PLTMOR)                                             



BASIC METABOLIC [id number] 15:26:00





             Test Item    Value        Reference Range Interpretation Comments

 

             GLUCOSE (test code = 06D) 118 mg/dL           H            

 

             SODIUM (test code = 01A) 136 mmol/L   136-145                   

 

             POTASSIUM (test code = 01B) 4.2 mmol/L   3.6-5.1                   

 

             CHLORIDE (test code = 04A) 106 mmol/L                       

 

             CO2 (test code = 02A) 24 mmol/L    22-32                     

 

             ANION GAP (test code = ANG) 10.2 mmol/L                            

 

             BUN (test code = 05D) 38 mg/dL     7-18         H            

 

             CREATININE (test code = 03E) 1.6 mg/dL    0.4-1.1      H           

 

 

             BUN/CREA (test code = BCR) 23           12-20        H            

 

             CALCIUM (test code = 09D) 9.2 mg/dL    8.3-9.5                   



CARDIAC [id number] 06:04:00





             Test Item    Value        Reference Range Interpretation Comments

 

             TROPONIN I (test code = A84) <0.015 ng/mL 0.000-0.045              

 



BASIC METABOLIC [id number] 05:52:00





             Test Item    Value        Reference Range Interpretation Comments

 

             GLUCOSE (test code = 06D) 171 mg/dL           H            

 

             SODIUM (test code = 01A) 138 mmol/L   136-145                   

 

             POTASSIUM (test code = 01B) 4.0 mmol/L   3.6-5.1                   

 

             CHLORIDE (test code = 04A) 107 mmol/L                       

 

             CO2 (test code = 02A) 23 mmol/L    22-32                     

 

             ANION GAP (test code = ANG) 12.0 mmol/L                            

 

             BUN (test code = 05D) 19 mg/dL     7-18         H            

 

             CREATININE (test code = 03E) 1.2 mg/dL    0.4-1.1      H           

 

 

             BUN/CREA (test code = BCR) 15           12-20                     

 

             CALCIUM (test code = 09D) 8.5 mg/dL    8.3-9.5                   



CBC (INCLUDES AUTOMATED DIFFERENTIAL)2019 05:30:00





             Test Item    Value        Reference Range Interpretation Comments

 

             WBC (test code = WBC) 14.0 10\S\3/uL 4.5-11.0     H            

 

             RBC (test code = RBC) 3.64 10\S\6/uL 4.20-5.60    L            

 

             HGB (test code = HBG) 10.8 g/dL    12.0-15.5    L            

 

             HCT (test code = HCT) 33.2 %       35.0-44.0    L            

 

             MCV (test code = MCV) 91.2 fL      81.0-99.0                 

 

             MCH (test code = MCH) 29.7 pg      27.0-31.0                 

 

             MCHC (test code = MCHC) 32.5 g/dL    32.0-36.0                 

 

             RDW (test code = RDW) 14.0 %       11.5-14.5                 

 

             PLT (test code = PLT) 143 10\S\3/uL 130-400                   

 

             MPV (test code = MPV) 11.4 fL      9.4-12.4                  

 

             NEUTROP # (test code = NE#) 12.1 10\S\3/uL 1.6-8.0      H          

  

 

             LYMPH # (test code = LY#) 0.9 10\S\3/uL 1.1-3.5      L            

 

             MONOCYTE # (test code = MO#) 0.9 10\S\3/uL 0.0-1.1                 

  

 

             EOSINOPH # (test code = EO#) 0.0 10\S\3/uL 0.0-0.7                 

  

 

             BASOPHIL # (test code = BA#) 0.0 10\S\3/uL 0.0-0.3                 

  

 

             IG # (test code = IG#) 0.10 10\S\3/uL 0.00-0.06    H            

 

             NRBC # (test code = NRBC#) 0.00 10\S\3/uL 0.00-0.01                

 

 

             NEUTROPH % (test code = NE%) 86.2 %       35.0-73.0    H           

 

 

             LYMPH % (test code = LY%) 6.3 %        20.0-55.0    L            

 

             MONO % (test code = MO%) 6.6 %        2.5-10.0                  

 

             EOSINOPH % (test code = EO%) 0.0 %        0.0-5.0                  

 

 

             BASOPHIL % (test code = BA%) 0.2 %        0.0-2.0                  

 

 

             IG % (test code = IG%) 0.7 %        0.0-0.8                   

 

             NRBC% (test code = NRBC%) 0.0 %        0.0-0.2                   

 

             MANDIFF (test code = MDIFF) NO           NO                        

 

             RBC MORPH (test code = RBCMOR)              NORMAL                 

   



CARDIAC [id number] 23:08:00





             Test Item    Value        Reference Range Interpretation Comments

 

             TROPONIN I (test code = A84) <0.015 ng/mL 0.000-0.045              

 



CT DISSECTION [id number] 19:26:35Exam: CT thorax PE protocol.Location: H
12History: [id number]: Chest painTechnique: Enhanced spiral slices were taken from
the apices of the lungs,through the upper abdomen utilizing a pulmonary embolism
protocol. Multiplanarreformations were performed. 100cc of Omnipaque were used. 
One or more of thefollowing radiation dose reduction techniques was used: 
automated exposurecontrol, adjustment of mA and/or KV according to patient size,
and/orutilization of iterative reconstruction technique.Findings:Nofilling 
defects are seen in the main pulmonary arteries. The pulmonaryvasculature is 
normal. No pulmonary venous congestion or arterial hypertensionis seen.The lungs
are clear. No infiltration or effusion is seen. No mass or nodule isseen.The 
mediastinum and the pulmonary kandis are normal. Calcified granulomas arenoted. No
lymphadenopathy is present. The heart size is  normal.No pericardialeffusion is
seen.The  visualized upper abdominal organs are unremarkable.Impression:1. 
Negative for pulmonary embolism.2. No acute disease.THYROID PANEL/SCREEN (TSH)
2019 18:29:00





             Test Item    Value        Reference Range Interpretation Comments

 

             TSH (test code = A57) 0.706 uIU/mL 0.358-3.740               



LIVER [id number] 18:28:00





             Test Item    Value        Reference Range Interpretation Comments

 

             BILI TOTAL (test code = 11A) 0.9 mg/dL    0.2-1.0                  

 

 

             BILI DIRCT (test code = 12A) 0.2 mg/dL    0.0-0.2                  

 

 

             BILI INDIR (test code = BILII) 0.7 mg/dL    <=0.8                  

   

 

             PROTEIN (test code = 07D) 7.7 g/dL     6.4-8.2                   

 

             ALBUMIN (test code = 08D) 3.8 g/dL     3.5-4.8                   

 

             GLOBULIN (test code = GLB) 3.9 g/dL     1.5-3.8      H            

 

             ALB/GLOB (test code = AGRR) 1.0          1.0-2.6                   

 

             ALK PHOS (test code = 35A) 106 IU/L                         

 

             AST (test code = 30A) 25 IU/L      <=42                      

 

             ALT (test code = 31A) 21 IU/L      <=78                      



BRAIN NATRIURETIC [id number] 18:19:00





             Test Item    Value        Reference Range Interpretation Comments

 

             proBNP (test code = PBNP) 518 pg/mL    0-125        H            



[id number] 18:15:00





             Test Item    Value        Reference Range Interpretation Comments

 

             MAGNESIUM (test code = 48A) 1.9 mg/dL    1.8-2.4                   



[id number] 17:40:00





             Test Item    Value        Reference Range Interpretation Comments

 

             D-DIMER (test code = <200 ng/mL D-DU 0-234                     



             DDI)                                                

 

             D-DIMER COMMENT (test *Level to rule out                           



             code = DDCOM) DVT or PE: <235 ng/mL                           



                          D-DU*                                  



CARDIAC [id number] 17:31:00





             Test Item    Value        Reference Range Interpretation Comments

 

             TROPONIN I (test code = A84) <0.015 ng/mL 0.000-0.045              

 



BASIC METABOLIC [id number] 17:27:00





             Test Item    Value        Reference Range Interpretation Comments

 

             GLUCOSE (test code = 06D) 114 mg/dL           H            

 

             SODIUM (test code = 01A) 142 mmol/L   136-145                   

 

             POTASSIUM (test code = 01B) 4.0 mmol/L   3.6-5.1                   

 

             CHLORIDE (test code = 04A) 111 mmol/L          H            

 

             CO2 (test code = 02A) 26 mmol/L    22-32                     

 

             ANION GAP (test code = ANG) 9.0 mmol/L                             

 

             BUN (test code = 05D) 19 mg/dL     7-18         H            

 

             CREATININE (test code = 03E) 1.2 mg/dL    0.4-1.1      H           

 

 

             BUN/CREA (test code = BCR) 15           12-20                     

 

             CALCIUM (test code = 09D) 9.2 mg/dL    8.3-9.5                   



CBC (INCLUDES AUTOMATED DIFFERENTIAL)2019 17:26:00





             Test Item    Value        Reference Range Interpretation Comments

 

             WBC (test code = WBC) 12.6 10\S\3/uL 4.5-11.0     H            

 

             RBC (test code = RBC) 4.04 10\S\6/uL 4.20-5.60    L            

 

             HGB (test code = HBG) 11.9 g/dL    12.0-15.5    L            

 

             HCT (test code = HCT) 37.6 %       35.0-44.0                 

 

             MCV (test code = MCV) 93.1 fL      81.0-99.0                 

 

             MCH (test code = MCH) 29.5 pg      27.0-31.0                 

 

             MCHC (test code = MCHC) 31.6 g/dL    32.0-36.0    L            

 

             RDW (test code = RDW) 13.8 %       11.5-14.5                 

 

             PLT (test code = PLT) 152 10\S\3/uL 130-400                   

 

             MPV (test code = MPV) 11.3 fL      9.4-12.4                  

 

             NEUTROP # (test code = NE#) 10.7 10\S\3/uL 1.6-8.0      H          

  

 

             LYMPH # (test code = LY#) 1.1 10\S\3/uL 1.1-3.5                   

 

             MONOCYTE # (test code = MO#) 0.6 10\S\3/uL 0.0-1.1                 

  

 

             EOSINOPH # (test code = EO#) 0.1 10\S\3/uL 0.0-0.7                 

  

 

             BASOPHIL # (test code = BA#) 0.0 10\S\3/uL 0.0-0.3                 

  

 

             IG # (test code = IG#) 0.06 10\S\3/uL 0.00-0.06                 

 

             NRBC # (test code = NRBC#) 0.00 10\S\3/uL 0.00-0.01                

 

 

             NEUTROPH % (test code = NE%) 85.2 %       35.0-73.0    H           

 

 

             LYMPH % (test code = LY%) 8.4 %        20.0-55.0    L            

 

             MONO % (test code = MO%) 5.0 %        2.5-10.0                  

 

             EOSINOPH % (test code = EO%) 0.6 %        0.0-5.0                  

 

 

             BASOPHIL % (test code = BA%) 0.3 %        0.0-2.0                  

 

 

             IG % (test code = IG%) 0.5 %        0.0-0.8                   

 

             NRBC% (test code = NRBC%) 0.0 %        0.0-0.2                   

 

             MANDIFF (test code = MDIFF) NO           NO                        



PTT (PARTIAL THROMBOPLASTIN TIME)2019 17:26:00





             Test Item    Value        Reference Range Interpretation Comments

 

             PTT (test code = 31.9 s       20.2-38.0                 



             PTT)                                                

 

             PTTH (test code = **** To monitor the                           



             PTTH)        effectiveness of heparin,                           



                          we offer the Anti-Xa                           



                          (Heparin Assay). It can be                           



                          used for either                           



                          unfractionated or LMW                           



                          Heparin. Order Code is                           



                          ANTI-XA ****                           



PROTHROMBIN [id number] 17:26:00





             Test Item    Value        Reference Range Interpretation Comments

 

             PT (test code = 12.0 s       9.8-13.6                  



             TT)                                                 

 

             INR (test code = 1.1                                    



             INR)                                                

 

             INRH (test code =      **** SUGGESTED                           



             INRH)        THERAPEUTIC RANGE FOR INR:                           



                          ****    2.5 - 3.5 **  For                           



                          Patients with Prosthetic                           



                          Valves or Patients with                           



                                          recurrent                           



                          Thromboembolic Events **                           



                          2.0 - 3.0 ** For Most Other                           



                          Applications **                           



XR CHEST 1 VIEW [id number] 17:04:28Portable AP chest, 1 viewLocation 
Code: S2IKUDVTFT HISTORY: Chest painCOMPARISON: NoneCOMMENT: Mild atelectasis is
present within the lung bases. The costophrenic angles aresharp. The 
cardiomediastinalsilhouette is unremarkable. The bones are intact.IMPRESSION: 
Mild bibasilar atelectasis. Otherwise, no acute abnormalityUNC Health [id number] 
16:51:00





             Test Item    Value        Reference Range Interpretation Comments

 

             Creatinine Lvl (test code = Creatinine 1.15         0.50-1.40      

           



             Lvl)                                                



[facility]2017-01-24 16:51:00





             Test Item    Value        Reference Range Interpretation Comments

 

             BUN (test code = BUN) 16           7-22                      



[facility]2017-01-24 16:51:00





             Test Item    Value        Reference Range Interpretation Comments

 

             Chloride Lvl (test code = Chloride Lvl) 109                  

            



[facility]2017-01-24 16:51:00





             Test Item    Value        Reference Range Interpretation Comments

 

             Potassium Lvl (test code = Potassium 4.3          3.5-5.1          

         



             Lvl)                                                



[facility]2017-01-24 16:51:00





             Test Item    Value        Reference Range Interpretation Comments

 

             Sodium Lvl (test code = Sodium Lvl) 144          135-145           

        



[facility]2017-01-24 16:51:00





             Test Item    Value        Reference Range Interpretation Comments

 

             CO2 (test code = CO2) 28           24-32                     



[facility]2017-01-24 16:51:00





             Test Item    Value        Reference Range Interpretation Comments

 

             Calcium Lvl (test code = Calcium Lvl) 8.4          8.5-10.5        

          



[facility]2017-01-24 16:51:00





             Test Item    Value        Reference Range Interpretation Comments

 

             AGAP (test code = AGAP) 11.3         10.0-20.0                 



[facility]2017-01-24 16:51:00





             Test Item    Value        Reference Range Interpretation Comments

 

             Glucose Lvl (test code = Glucose Lvl) 109          70-99           

          



[facility]2017-01-24 16:51:00





             Test Item    Value        Reference Range Interpretation Comments

 

             eGFR (test code = eGFR) 52                                     



[facility]

## 2021-12-31 NOTE — EDPHYS
Physician Documentation                                                                           

 [facility]                                                                 

Name: Juli Rushing                                                                             

Age: 66 yrs                                                                                       

Sex: Female                                                                                       

: 1955                                                                                   

MRN: [id number]                                                                                   

Arrival Date: 2021                                                                          

Time: 16:33                                                                                       

Account#: [id number]                                                                            

Bed Waiting                                                                                       

Private MD:                                                                                       

ED Physician Scotty Leavitt                                                                         

HPI:                                                                                              

                                                                                             

16:44 This 66 yrs old Female presents to ER via Un[facility]igned with complaints of Covid Test.     kb  

16:44 The patient presents to the emergency department with nausea, vomiting. Onset: The      kb  

      symptoms/episode began/occurred 2 day(s) ago. Possible causes: unknown. The symptoms        

      are aggravated by nothing. The symptoms are alleviated by nothing. Associated signs and     

      symptoms: Pertinent positives: nausea, vomiting. Severity of symptoms: At their worst       

      the symptoms were moderate in the emergency department the symptoms have improved. The      

      patient has not experienced similar symptoms in the past. The patient has not recently      

      seen a physician. Pt reports nausea, vomiting and headache for 2 days. No vomiting          

      today, but nausea continues. States she came in for a covid test because dialysis told      

      her she would get results today.                                                            

                                                                                                  

Historical:                                                                                       

- Allergies:                                                                                      

16:47 PENICILLINS;                                                                            eo2 

- PMHx:                                                                                           

16:47 stage 4 kidney failure; dialysis; Hypertensive disorder; high cholesterol;              eo2 

                                                                                                  

- Immunization history:: Adult Immunizations not up to date, Client reports having NOT            

  received the Covid vaccine. Flu vaccine is not up to date.                                      

- Social history:: Smoking status: Patient denies any tobacco usage or history of.                

  Patient/guardian denies using alcohol, street drugs.                                            

                                                                                                  

                                                                                                  

ROS:                                                                                              

16:44 Constitutional: Negative for fever, chills, and weight loss.                            kb  

16:44 Abdomen/GI: Positive for nausea and vomiting, Negative for abdominal pain.                  

16:44 All other systems are negative.                                                             

16:44 Neuro: Positive for headache.                                                           kb  

                                                                                                  

Exam:                                                                                             

16:45 Constitutional:  This is a well developed, well nourished patient who is awake, alert,  kb  

      and in no acute distress. Head/Face:  Normocephalic, atraumatic. ENT:  Moist Mucous         

      membranes Respiratory:  Respirations even and unlabored. No increased work of               

      breathing. Talking in full sentences Skin:  Warm, dry with normal turgor.  Normal           

      color. MS/ Extremity:  Pulses equal, no cyanosis.  Neurovascular intact.  Full, normal      

      range of motion. Neuro:  Awake and alert, GCS 15, oriented to person, place, time, and      

      situation. Moves all extremities. Normal gait. Psych:  Awake, alert, with orientation       

      to person, place and time.  Behavior, mood, and affect are within normal limits.            

                                                                                                  

Vital Signs:                                                                                      

16:42  / 58; Pulse 73; Resp 19; Temp 98.4(O); Pulse Ox 100% on 2 lpm NC; Weight 104.33  eo2 

      kg; Height 5 ft. 7 in. (170.18 cm); Pain 9/10;                                              

16:42 Body Mass Index 36.02 (104.33 kg, 170.18 cm)                                            eo2 

16:42 pt reports lower back pain, states I don't know if I have a urinary tract infection     eo2 

                                                                                                  

MDM:                                                                                              

16:44 Patient medically screened.                                                             kb  

16:45 Data reviewed: vital signs, nurses notes. Data interpreted: Pulse oximetry: on room air kb  

      is 100 %. Interpretation: normal.                                                           

16:59 ED course: Pt states she does not want to stay for further workup. Came for a covid     kb  

      test and was under the impression that she would get the results today because the          

      dialysis clinic told her that. Pt states she will follow up with her PCP instead of         

      waiting for further studies.                                                                

                                                                                                  

                                                                                             

16:53 Order name: COVID-19 (Coronavirus) Document "Date of Onset" if Symptomatic              kb  

                                                                                                  

Administered Medications:                                                                         

No medications were administered                                                                  

                                                                                                  

                                                                                                  

Disposition:                                                                                      

18:03 Co-signature as Attending Physician, Scotty Leavitt MD.                                    rn  

                                                                                                  

Disposition Summary:                                                                              

21 17:04                                                                                    

Eloped                                                                                            

      Disposition: after being seen by provider                                               eo2 

      Reason: (see nurse's notes)                                                             eo2 

Signatures:                                                                                       

Dispatcher [employer]                           Maribel Molina, DIAMONDP-C                 FNP-CkScotty Fritz MD MD rn Owoade, Eunice, RN                      RN   eo2                                                  

                                                                                                  

**************************************************************************************************

## 2022-01-04 ENCOUNTER — HOSPITAL ENCOUNTER (OUTPATIENT)
Dept: HOSPITAL 97 - ER | Age: 67
Setting detail: OBSERVATION
LOS: 1 days | Discharge: HOME | End: 2022-01-05
Admitting: INTERNAL MEDICINE
Payer: COMMERCIAL

## 2022-01-04 VITALS — BODY MASS INDEX: 38.3 KG/M2

## 2022-01-04 DIAGNOSIS — E87.70: ICD-10-CM

## 2022-01-04 DIAGNOSIS — Z91.15: ICD-10-CM

## 2022-01-04 DIAGNOSIS — Z88.0: ICD-10-CM

## 2022-01-04 DIAGNOSIS — N39.0: Primary | ICD-10-CM

## 2022-01-04 DIAGNOSIS — Z79.01: ICD-10-CM

## 2022-01-04 DIAGNOSIS — Z87.442: ICD-10-CM

## 2022-01-04 DIAGNOSIS — E87.5: ICD-10-CM

## 2022-01-04 DIAGNOSIS — E78.5: ICD-10-CM

## 2022-01-04 DIAGNOSIS — Z99.2: ICD-10-CM

## 2022-01-04 DIAGNOSIS — R05.9: ICD-10-CM

## 2022-01-04 DIAGNOSIS — R09.89: ICD-10-CM

## 2022-01-04 DIAGNOSIS — I95.89: ICD-10-CM

## 2022-01-04 DIAGNOSIS — N18.6: ICD-10-CM

## 2022-01-04 DIAGNOSIS — Z88.8: ICD-10-CM

## 2022-01-04 DIAGNOSIS — Z20.822: ICD-10-CM

## 2022-01-04 PROCEDURE — 84484 ASSAY OF TROPONIN QUANT: CPT

## 2022-01-04 PROCEDURE — 93005 ELECTROCARDIOGRAM TRACING: CPT

## 2022-01-04 PROCEDURE — 87086 URINE CULTURE/COLONY COUNT: CPT

## 2022-01-04 PROCEDURE — 99285 EMERGENCY DEPT VISIT HI MDM: CPT

## 2022-01-04 PROCEDURE — 87186 SC STD MICRODIL/AGAR DIL: CPT

## 2022-01-04 PROCEDURE — 71045 X-RAY EXAM CHEST 1 VIEW: CPT

## 2022-01-04 PROCEDURE — 81015 MICROSCOPIC EXAM OF URINE: CPT

## 2022-01-04 PROCEDURE — 96365 THER/PROPH/DIAG IV INF INIT: CPT

## 2022-01-04 PROCEDURE — 80076 HEPATIC FUNCTION PANEL: CPT

## 2022-01-04 PROCEDURE — 74176 CT ABD & PELVIS W/O CONTRAST: CPT

## 2022-01-04 PROCEDURE — 85610 PROTHROMBIN TIME: CPT

## 2022-01-04 PROCEDURE — 83735 ASSAY OF MAGNESIUM: CPT

## 2022-01-04 PROCEDURE — 87088 URINE BACTERIA CULTURE: CPT

## 2022-01-04 PROCEDURE — 87077 CULTURE AEROBIC IDENTIFY: CPT

## 2022-01-04 PROCEDURE — 85025 COMPLETE CBC W/AUTO DIFF WBC: CPT

## 2022-01-04 PROCEDURE — 82947 ASSAY GLUCOSE BLOOD QUANT: CPT

## 2022-01-04 PROCEDURE — 76377 3D RENDER W/INTRP POSTPROCES: CPT

## 2022-01-04 PROCEDURE — 81003 URINALYSIS AUTO W/O SCOPE: CPT

## 2022-01-04 PROCEDURE — 36415 COLL VENOUS BLD VENIPUNCTURE: CPT

## 2022-01-04 PROCEDURE — 0240U: CPT

## 2022-01-04 PROCEDURE — 84443 ASSAY THYROID STIM HORMONE: CPT

## 2022-01-04 PROCEDURE — 87040 BLOOD CULTURE FOR BACTERIA: CPT

## 2022-01-04 PROCEDURE — 83880 ASSAY OF NATRIURETIC PEPTIDE: CPT

## 2022-01-04 PROCEDURE — 90935 HEMODIALYSIS ONE EVALUATION: CPT

## 2022-01-04 PROCEDURE — 80048 BASIC METABOLIC PNL TOTAL CA: CPT

## 2022-01-04 NOTE — XMS REPORT
Continuity of Care Document

                           Created on:2022



Patient:SUZI SEARS

Sex:Female

:1955

External Reference #:[id number]





Demographics







                          Address                   [address] [place] [address] [address]



                                                    [place] [address]

 

                          Home Phone                [phone]

 

                          Work Phone                [phone]

 

                          Email Address             [email or web address]

 

                          Preferred Language        English

 

                          Marital Status            Unknown

 

                          Latter day Affiliation     Unknown

 

                          Race                      Unknown

 

                          Additional Race(s)        Unavailable

 

                          Ethnic Group              Unknown









Author







                          Organization              [facility]

t

 

                          Address                   [address]



                                                    [place] [address]

 

                          Phone                     [phone]









Support







                Name            Relationship    Address         Phone

 

                EDUARDO SEARS      FAUSTO              [address] [phone]



                                                [place] [address] 

 

                EDUARDO SEARS      FAUSTO              Unavailable     [phone]









Care Team Providers







                    Name                Role                Phone

 

                    AHMED               Attending Clinician Unavailable

 

                    ANABEL              Attending Clinician Unavailable

 

                    TONO           Attending Clinician Unavailable

 

                    MD ALIYA LAMAR Attending Clinician Unavailable

 

                    LAKHWINDER           Attending Clinician Unavailable

 

                    RADHA              Attending Clinician Unavailable

 

                    ANAIS          Attending Clinician Unavailable

 

                    MD ANAIS      Attending Clinician Unavailable

 

                    MD ANABEL  OBIOMA  Attending Clinician Unavailable

 

                    MD LATISHA POWER Attending Clinician Unavailable

 

                    DANIELLA               Attending Clinician Unavailable

 

                    CHAD              Attending Clinician Unavailable

 

                    DR KEIKO            Attending Clinician Unavailable

 

                    TONO           Admitting Clinician Unavailable

 

                    MD ALIYA LAMAR Admitting Clinician Unavailable

 

                    ANAIS          Admitting Clinician Unavailable

 

                    MD ANAIS      Admitting Clinician Unavailable

 

                    ANABEL              Admitting Clinician Unavailable

 

                    MD JOSEY BECKHAM        Admitting Clinician Unavailable

 

                    MD LATISHA POWER Admitting Clinician Unavailable

 

                    DR KEIKO            Admitting Clinician Unavailable









Problems







       Condition Condition Condition Status Onset  Resolution Last   Treating Co

mments 

Source



       Name   Details Category        Date   Date   Treatment Clinician        



                                                 Date                 

 

       SOLO           Diagnosis Active 2021               [facility]

[facility]



                                             12:03:00               l



                SOLO                00:00:                             Barron



                                   00                                 



                                                                      



              Active                                                  



                                                                      



                                                                      



              2021                                                  



                                                                      



                                                                      



                                                                      



                                                                      



                                                                      



                                                                      



                                                                      



                                                                      



                                                                       



              [place]                                                  



                                                                      



                                                                      

 

       COLON         Diagnosis Active 2017               [facility]

[facility]



       CANCER                                05:49:00               l



       SCREENING-   COLON               00:00:                             [facility]

nn



       Z12.11 CANCER               00                                 



              SCREENING-                                                  



              Z12.11                                                  



                                                                      



                                                                      



              Active                                                  



                                                                      



                                                                      



              2017                                                  



                                                                      



                                                                      



                                                                      



                                                                      



                                                                      



                                                                      



                                                                      



                                                                      



                                                                       



              [place]                                                  



                                                                      



                                                                      

 

       R92.1 -        Diagnosis Active 2016               Me

moria



       MAMMOGRAPH                                15:55:00               l



       IC       R92.1 -               00:01:                             Barron



       CALCIFCN MAMMOGRAPH               00                                 



       FOUND ON IC                                                      



              CALCIFCN                                                  



              FOUND ON                                                  



                                                                      



                                                                      



              Active                                                  



                                                                      



                                                                      



              2016                                                  



                                                                      



                                                                      



                                                                      



                                                                      



                                                                      



                                                                      



                                                                      



                                                                      



                                                                       



              [facility]                                                  



              [facility]                                                    



                                                                      



                                                                      

 

       Z12.31 -        Diagnosis Active 2016               [facility]

[facility]



       ENCNTR                      -          07:08:00               l



       SCREEN   Z12.31 -               00:01:                             Donny martinez



       MAMMOGRAM ENCNTR               00                                 



       FOR MA SCREEN                                                  



              MAMMOGRAM                                                  



              FOR MA                                                  



                                                                      



                                                                      



              Active                                                  



                                                                      



                                                                      



              2016                                                  



                                                                      



                                                                      



                                                                      



                                                                      



                                                                      



                                                                      



                                                                      



                                                                      



                                                                       



              [facility]                                                  



              [facility]                                                    



                                                                      



                                                                      

 

       RT WRIST        Diagnosis Active 2017               M

[facility]



       DISTAL                      1-          02:03:00               l



       RADIUS   RT WRIST               09:00:                             Donny martinez



       CLSD FX DISTAL               00                                 



              RADIUS                                                  



              CLSD FX                                                  



                                                                      



                                                                      



              Active                                                  



                                                                      



                                                                      



              2016                                                  



                                                                      



                                                                      



                                                                      



                                                                      



                                                                      



                                                                      



                                                                      



                                                                      



                     [facility]                                                  



              [place]                                                  



              [facility]                                                  



              [facility]                                                   



                                                                      



                                                                      

 

       Abnormal        Problem Active 2021               [facility]

[facility]



       glucose                      7-02          22:17:41               l



       level    Abnormal               00:00:                             Donny martinez



       (finding) glucose               00                                 



              level                                                   



              (finding)                                                  



                                                                      



                                                                      



              Active                                                  



                                                                      



                                                                      



              2015                                                  



                                                                      



                                                                      



               Problem                                                  



                                                                      



                                                                      



              2021                                                  



                                                                      



                                                                      



               Data                                                   



              migrated                                                  



              from [facility]                                                  



              on 7/8/15.                                                  



                                                                      



                                                                      



                                                                    



              Medical                                                  



              Group,                                                  



              [facility],                                                  



              [facility]                                                  



              [facility],                                                  



              [facility]                                                     



              [facility]                                                  



              [facility]                                                  



              [place]                                                  



              [facility]                                                  



              [facility],                                                  



              [place]                                                  



                                                                      



                                                                      

 

       Lymphedema        Problem Active 2020               [facility]

[facility]



       (disorder)                      2-          00:38:51               l



                                   00:00:                             Barron



              Lymphedema               00                                 



              (disorder)                                                  



                                                                      



                                                                      



               Active                                                  



                                                                      



                                                                      



              2014                                                  



                                                                      



                                                                      



               Problem                                                  



                                                                      



                                                                      



              2020                                                  



                                                                      



                                                                      



               Data                                                   



              migrated                                                  



              from [facility]                                                  



              on                                                      



              5/30/15.                                                  



                                                                      



                                                                      



               Medical                                                  



              Group,                                                  



              [facility],                                                  



              [facility]                                                  



              [facility],                                                  



              [facility]                                                     



              [facility]                                                  



              [facility]                                                  



              [place]                                                  



              [facility]                                                  



              [facility],                                                  



              [place]                                                  



                                                                      



                                                                      

 

       Obstructiv        Problem Active 2021               [facility]

[facility]



       e sleep                      2-04          22:17:41               l



       apnea                       00:00:                             Barron



       syndrome Obstructiv               00                                 



       (disorder) e sleep                                                  



              apnea                                                   



              syndrome                                                  



              (disorder)                                                  



                                                                      



                                                                      



               Active                                                  



                                                                      



                                                                      



              2014                                                  



                                                                      



                                                                      



               Problem                                                  



                                                                      



                                                                      



              2021                                                  



                                                                      



                                                                      



               Data                                                   



              migrated                                                  



              from [facility]                                                  



              on                                                      



              5/30/15.                                                  



                                                                      



                                                                      



               Medical                                                  



              Group,                                                  



              [employer],                                                  



              [facility]                                                  



              [facility],                                                  



              [facility]                                                     



              [facility]                                                  



              [facility]                                                  



              [place]                                                  



              [facility]                                                  



              [facility],                                                  



              [place]                                                  



                                                                      



                                                                      

 

       Hypothyroi        Problem Active 2013-0        2019-10-19               M

[facility]



       dism                                  21:25:36               l



       (disorder)                      00:00:                             Donny

n



              Hypothyroi               00                                 



              dism                                                    



              (disorder)                                                  



                                                                      



                                                                      



               Active                                                  



                                                                      



                                                                      



              2013                                                  



                                                                      



                                                                      



               Problem                                                  



                                                                      



                                                                      



              10/19/2019                                                  



                                                                      



                                                                      



               Data                                                   



              migrated                                                  



              from [employer]                                                  



              [employer]                                                  



              on                                                      



              5/30/15.                                                  



                                                                      



                                                                      



               Medical                                                  



              Group,                                                  



              [facility]                                                    



              [facility],                                                  



              [facility]                                                  



              [facility],                                                  



              [facility]                                                     



              [facility]                                                  



              [facility]                                                  



              [place]                                                  



              [facility]                                                  



              [facility],                                                  



              [place]                                                  



                                                                      



                                                                      

 

       Depressive        Problem Active 2021               [facility]

[facility]



       disorder                      4-24          22:17:41               l



       (disorder)                      00:00:                             Donny

n



              Depressive               00                                 



              disorder                                                  



              (disorder)                                                  



                                                                      



                                                                      



               Active                                                  



                                                                      



                                                                      



              2013                                                  



                                                                      



                                                                      



               Problem                                                  



                                                                      



                                                                      



              2021                                                  



                                                                      



                                                                      



               Data                                                   



              migrated                                                  



              from [employer]                                                  



              [employer]                                                  



              on                                                      



              5/30/15.                                                  



                                                                      



                                                                      



               Medical                                                  



              Group,                                                  



              [facility]                                                    



              [facility],                                                  



              [facility]                                                  



              [facility],                                                  



              [facility]                                                     



              [facility]                                                  



              [facility]                                                  



              [place]                                                  



              [facility]                                                  



              [facility],                                                  



              [place]                                                  



                                                                      



                                                                      

 

       Obesity        Problem Active 2016               [place]

[place]



       (disorder)                      8-20          00:23:22               l



                Obesity               00:00:                             [place]



              (disorder)               00                                 



                                                                      



                                                                      



               Active                                                  



                                                                      



                                                                      



              2012                                                  



                                                                      



                                                                      



               Problem                                                  



                                                                      



                                                                      



              2016                                                  



                                                                      



                                                                      



               Data                                                   



              migrated                                                  



              from [employer]                                                  



              [facility]                                                  



              on                                                      



              5/30/15.                                                  



                                                                      



                                                                      



               [facility]                                                  



              [facility],                                                  



              [facility]                                                  



              [facility],                                                  



              [facility]                                                     



              [facility]                                                  



              [facility]                                                  



              [place]                                                  



              [facility]                                                  



              [facility]                                                   



                                                                      



                                                                      

 

       Cobalamin        Problem Active 2021               [facility]

[facility]



       deficiency                      7-11          22:17:41               l



       (disorder)                      00:00:                             Donny

n



              Cobalamin               00                                 



              deficiency                                                  



              (disorder)                                                  



                                                                      



                                                                      



               Active                                                  



                                                                      



                                                                      



              2012                                                  



                                                                      



                                                                      



               Problem                                                  



                                                                      



                                                                      



              2021                                                  



                                                                      



                                                                      



               Data                                                   



              migrated                                                  



              from [employer]                                                  



              [facility]                                                  



              on                                                      



              5/30/15.                                                  



                                                                      



                                                                      



               Medical                                                  



              Group,                                                  



              [facility]                                                    



              [facility],                                                  



              [facility]                                                  



              [facility],                                                  



              [facility]                                                     



              [facility]                                                  



              [facility]                                                  



              [place]                                                  



              [facility]                                                  



              [facility],                                                  



              [place]                                                  



                                                                      



                                                                      

 

       Hypertensi        Problem Active 2016               [facility]

[facility]



       ve episode                      7-10          00:23:22               l



       (disorder)                      00:00:                             Donny

n



              Hypertensi               00                                 



              ve episode                                                  



              (disorder)                                                  



                                                                      



                                                                      



               Active                                                  



                                                                      



                                                                      



              07/10/2012                                                  



                                                                      



                                                                      



               Problem                                                  



                                                                      



                                                                      



              2016                                                  



                                                                      



                                                                      



               Data                                                   



              migrated                                                  



              from [employer]                                                  



              [employer]                                                  



              on                                                      



              5/30/15.                                                  



                                                                      



                                                                      



              [facility]                                                  



              [facility],                                                  



              [facility]                                                  



              [facility],                                                  



              [facility]                                                     



              [facility]                                                  



              [facility]                                                  



              [place]                                                  



              [facility]                                                  



              [facility]                                                   



                                                                      



                                                                      

 

       Pain in        Problem                      2016               [place]

[facility]



       wrist                                     05:02:18               l



       (finding)   Pain in                                                  [facility]

daryl



              wrist                                                   



              (finding)                                                  



                                                                      



                                                                      



                                                                      



                                                                      



                                                                      



                                                                      



                                                                      



              Problem                                                  



                                                                      



                                                                      



              2016                                                  



                                                                      



                                                                      



                                                                      



                                                                      



                                                                      



              [facility]                                                  



              [facility]                                                  



              [facility]                                                    



                                                                      



                                                                      

 

       Calcificat        Problem Resolve               2021               

[facility]



       ion of               d                    22:17:41               l



       breast                                                         [place]



       (finding) Calcificat                                                  



              ion of                                                  



              breast                                                  



              (finding)                                                  



                                                                      



                                                                      



              Resolved                                                  



                                                                      



                                                                      



                                                                      



                                                                      



               Problem                                                  



                                                                      



                                                                      



              2021                                                  



                                                                      



                                                                      



               Left                                                   



                                                                      



                                                                       



              Medical                                                  



              Group,                                                  



              [facility]                                                    



              [facility],                                                  



              [facility]                                                  



              [facility],                                                  



              [facility]                                                     



              [facility]                                                  



              [facility]                                                  



              [place]                                                  



              [facility]                                                  



              [facility],                                                  



              [place]                                                  



                                                                      



                                                                      

 

       Dysuria        Problem Resolve               2021               [facility]

[facility]



       (finding)               d                    22:17:41               l



                Dysuria                                                  [place]



              (finding)                                                  



                                                                      



                                                                      



              Resolved                                                  



                                                                      



                                                                      



                                                                      



                                                                      



               Problem                                                  



                                                                      



                                                                      



              2021                                                  



                                                                      



                                                                      



                                                                      



                                                                      



                                                                    



              Medical                                                  



              Group,                                                  



              [facility]                                                  



              [facility],                                                  



              [place]                                                  



                                                                      



                                                                      

 

       Acute         Problem Active               2020               [facility]

ia



       urinary                                    22:36:35               l



       tract    Acute                                                  [place]



       infection urinary                                                  



       (disorder) tract                                                   



              infection                                                  



              (disorder)                                                  



                                                                      



                                                                      



               Active                                                  



                                                                      



                                                                      



                                                                      



                                                                      



              Problem                                                  



                                                                      



                                                                      



              2020                                                  



                                                                      



                                                                      



                                                                      



                                                                      



                                                                    



              Medical                                                  



              Group                                                   



                                                                      



                                                                      

 

       Chronic        Problem Active               2020               [place]

[place]



       renal                                     22:36:35               l



       impairment   Chronic                                                  [facility]

[place]



       (disorder) renal                                                   



              impairment                                                  



              (disorder)                                                  



                                                                      



                                                                      



               Active                                                  



                                                                      



                                                                      



                                                                      



                                                                      



              Problem                                                  



                                                                      



                                                                      



              2020                                                  



                                                                      



                                                                      



                                                                      



                                                                      



                                                                    



              Medical                                                  



              Group,                                                  



              OPIL[facility]arate,                                                  



              [facility]                                                  



              [facility],                                                  



              [facility]                                                     



              [facility]                                                  



              [facility]                                                  



              [place]                                                  



              [facility]                                                  



              [facility],                                                  



              [place] [facility]                                                  



                                                                      



                                                                      

 

       Diabetes        Problem Active               2020               [facility]

[facility]



       mellitus                                    23:27:54               l



       (disorder)   Diabetes                                                  He

rmann



              mellitus                                                  



              (disorder)                                                  



                                                                      



                                                                      



               Active                                                  



                                                                      



                                                                      



                                                                      



                                                                      



              Problem                                                  



                                                                      



                                                                      



              2020                                                  



                                                                      



                                                                      



                                                                      



                                                                      



                                                                    



              Medical                                                  



              Group,                                                  



              [facility]                                                  



              [facility]                                                    



                                                                      



                                                                      

 

       Urinalysis        Problem Active               2021               [facility]

[facility]



       = abnormal                                    22:17:41               l



       (finding)                                                         [place]



              Urinalysis                                                  



              = abnormal                                                  



              (finding)                                                  



                                                                      



                                                                      



              Active                                                  



                                                                      



                                                                      



                                                                      



                                                                      



              Problem                                                  



                                                                      



                                                                      



              2021                                                  



                                                                      



                                                                      



                                                                      



                                                                      



                                                                    



              Medical                                                  



              Group,                                                  



              [facility]                                                  



              [place],                                                  



              [place]                                                  



                                                                      



                                                                      

 

       Atrial        Problem Active               2021               [facility]

ia



       fibrillati                                    22:17:41               l



       on       Atrial                                                  [place]



       (disorder) fibrillati                                                  



              on                                                      



              (disorder)                                                  



                                                                      



                                                                      



               Active                                                  



                                                                      



                                                                      



                                                                      



                                                                      



              Problem                                                  



                                                                      



                                                                      



              2021                                                  



                                                                      



                                                                      



                                                                      



                                                                      



                                                                    



              Medical                                                  



              Group,                                                  



              [facility]                                                  



              [place],                                                  



              [place]                                                  



                                                                      



                                                                      

 

       Benign        Problem Active               2021               [facility]

ia



       essential                                    22:17:41               l



       hypertensi   Benign                                                  [facility]

daryl



       on     essential                                                  



       (disorder) hypertensi                                                  



              on                                                      



              (disorder)                                                  



                                                                      



                                                                      



               Active                                                  



                                                                      



                                                                      



                                                                      



                                                                      



              Problem                                                  



                                                                      



                                                                      



              2021                                                  



                                                                      



                                                                      



                                                                      



                                                                      



                                                                    



              Medical                                                  



              Group,                                                  



              [facility]                                                    



              [facility],                                                  



              [facility]                                                  



              [facility],                                                  



              [facility]                                                     



              [facility]                                                  



              [facility]                                                  



              [place]                                                  



              [facility]                                                  



              [facility],                                                  



              [place]                                                  



                                                                      



                                                                      

 

       Cardiorena        Problem Active               2021               [facility]

[facility]



       l syndrome                                    22:17:41               l



       (disorder)                                                         Donny

n



              Cardiorena                                                  



              l syndrome                                                  



              (disorder)                                                  



                                                                      



                                                                      



               Active                                                  



                                                                      



                                                                      



                                                                      



                                                                      



              Problem                                                  



                                                                      



                                                                      



              2021                                                  



                                                                      



                                                                      



                                                                      



                                                                      



                                                                    



              Medical                                                  



              Group,                                                  



              [facility]                                                  



              [facility],                                                  



              [place]                                                  



                                                                      



                                                                      

 

       Chronic        Problem Active               2021               [place]

milan



       kidney                                    22:17:41               l



       disease   Chronic                                                  Donny

n



       (disorder) kidney                                                  



              disease                                                  



              (disorder)                                                  



                                                                      



                                                                      



               Active                                                  



                                                                      



                                                                      



                                                                      



                                                                      



              Problem                                                  



                                                                      



                                                                      



              2021                                                  



                                                                      



                                                                      



                                                                      



                                                                      



                                                                    



              Medical                                                  



              Group,                                                  



              [facility]                                                  



              [facility],                                                  



              [place]                                                  



                                                                      



                                                                      

 

       Chronic        Problem Active               2021               [place]

milan



       kidney                                    22:17:41               l



       disease   Chronic                                                  Donny

n



       stage 3 kidney                                                  



       (disorder) disease                                                  



              stage 3                                                  



              (disorder)                                                  



                                                                      



                                                                      



               Active                                                  



                                                                      



                                                                      



                                                                      



                                                                      



              Problem                                                  



                                                                      



                                                                      



              2021                                                  



                                                                      



                                                                      



                                                                      



                                                                      



                                                                    



              Medical                                                  



              Group,                                                  



              [facility]                                                  



              [facility],                                                  



              [place]                                                  



                                                                      



                                                                      

 

       Chronic        Problem Active               2021               [place]

milan



       pain                                      22:17:41               l



       (finding)   Chronic                                                  Herm

daryl



              pain                                                    



              (finding)                                                  



                                                                      



                                                                      



              Active                                                  



                                                                      



                                                                      



                                                                      



                                                                      



              Problem                                                  



                                                                      



                                                                      



              2021                                                  



                                                                      



                                                                      



                                                                      



                                                                      



                                                                    



              Medical                                                  



              Group,                                                  



              [facility]                                                  



              [place],                                                  



              [place]                                                  



                                                                      



                                                                      

 

       Dependence        Problem Active               2021               [facility]

[facility]



       on                                        22:17:41               l



       supplement                                                         Donny

n



       al oxygen Dependence                                                  



       (finding) on                                                      



              supplement                                                  



              al oxygen                                                  



              (finding)                                                  



                                                                      



                                                                      



              Active                                                  



                                                                      



                                                                      



                                                                      



                                                                      



              Problem                                                  



                                                                      



                                                                      



              2021                                                  



                                                                      



                                                                      



                                                                      



                                                                      



                                                                    



              Medical                                                  



              Group,                                                  



              [place]                                                  



                                                                      



                                                                      

 

       Edema of        Problem Active               2021               [facility]

[facility]



       lower                                     22:17:41               l



       extremity   Edema of                                                  Her

[place]



       (finding) lower                                                   



              extremity                                                  



              (finding)                                                  



                                                                      



                                                                      



              Active                                                  



                                                                      



                                                                      



                                                                      



                                                                      



              Problem                                                  



                                                                      



                                                                      



              2021                                                  



                                                                      



                                                                      



                                                                      



                                                                      



                                                                    



              Medical                                                  



              Group,                                                  



              [facility]                                                  



              [place],                                                  



              [place]                                                  



                                                                      



                                                                      

 

       Hyperlipid        Problem Active               2021               [facility]

[facility]



       emia                                      22:17:41               l



       (disorder)                                                         Donny

n



              Hyperlipid                                                  



              emia                                                    



              (disorder)                                                  



                                                                      



                                                                      



               Active                                                  



                                                                      



                                                                      



                                                                      



                                                                      



              Problem                                                  



                                                                      



                                                                      



              2021                                                  



                                                                      



                                                                      



               Data                                                   



              migrated                                                  



              from [facility]                                                  



              on                                                      



              5/30/15.                                                  



                                                                      



                                                                      



               Medical                                                  



              Group,                                                  



              [facility],                                                  



              [facility]                                                  



              Land,[facility]                                                     



              [facility]                                                  



              Trace,[facility]                                                  



              Bone &                                                  



              Joint,                                                  



              [place]                                                  



                                                                      



                                                                      

 

       Hyperparat        Problem Active               2021               M

emoria



       hyroidism                                    22:17:41               l



       (disorder)                                                         Donny

n



              Hyperparat                                                  



              hyroidism                                                  



              (disorder)                                                  



                                                                      



                                                                      



               Active                                                  



                                                                      



                                                                      



                                                                      



                                                                      



              Problem                                                  



                                                                      



                                                                      



              2021                                                  



                                                                      



                                                                      



                                                                      



                                                                      



                                                                    



              Medical                                                  



              Group,                                                  



              [facility]                                                  



              Land,                                                  



              [place]                                                  



                                                                      



                                                                      

 

       Hypertensi        Problem Active               2021               M

emoria



       ve                                        22:17:41               l



       disorder,                                                         [place]



       systemic Hypertensi                                                  



       arterial ve                                                      



       (disorder) disorder,                                                  



              systemic                                                  



              arterial                                                  



              (disorder)                                                  



                                                                      



                                                                      



               Active                                                  



                                                                      



                                                                      



                                                                      



                                                                      



              Problem                                                  



                                                                      



                                                                      



              2021                                                  



                                                                      



                                                                      



                                                                      



                                                                      



                                                                    



              Medical                                                  



              Group,[facility]                                                    



              [facility]                                                  



              [facility]                                                  



              [facility]                                                  



              Land,                                                  



              [facility]                                                  



              Land,                                                  



              [place]                                                  



                                                                      



                                                                      

 

       Hypertensi        Problem Active               2021               M

emoria



       ve renal                                    22:17:41               l



       disease                                                         [place]



       (disorder) Hypertensi                                                  



              ve renal                                                  



              disease                                                  



              (disorder)                                                  



                                                                      



                                                                      



               Active                                                  



                                                                      



                                                                      



                                                                      



                                                                      



              Problem                                                  



                                                                      



                                                                      



              2021                                                  



                                                                      



                                                                      



                                                                      



                                                                      



                                                                    



              Medical                                                  



              Group,                                                  



              [facility]                                                  



              Land,                                                  



              [place]                                                  



                                                                      



                                                                      

 

       Hyperurice        Problem Active               2021               M

emoria



       keegan                                       22:17:41               l



       (disorder)                                                         Donny

n



              Hyperurice                                                  



              keegan                                                     



              (disorder)                                                  



                                                                      



                                                                      



               Active                                                  



                                                                      



                                                                      



                                                                      



                                                                      



              Problem                                                  



                                                                      



                                                                      



              2021                                                  



                                                                      



                                                                      



               Data                                                   



              migrated                                                  



              from [facility]                                                  



              on                                                      



              5/30/15.                                                  



                                                                      



                                                                      



               Medical                                                  



              Group,                                                  



              [facility]                                                    



              [facility],                                                  



              [facility]                                                  



              Land,                                                  



              [facility]                                                     



              [facility]                                                  



              [facility]                                                  



              [place]                                                  



              [facility]one &                                                  



              Joint,                                                  



              [place]                                                  



                                                                      



                                                                      

 

       Lymphedema        Problem Active               2021               M

emoria



       of lower                                    22:17:41               l



       extremity                                                         [place]



       (disorder) Lymphedema                                                  



              of lower                                                  



              extremity                                                  



              (disorder)                                                  



                                                                      



                                                                      



               Active                                                  



                                                                      



                                                                      



                                                                      



                                                                      



              Problem                                                  



                                                                      



                                                                      



              2021                                                  



                                                                      



                                                                      



                                                                      



                                                                      



                                                                    



              Medical                                                  



              Group,                                                  



              [facility]                                                  



              Land,                                                  



              [place]                                                  



                                                                      



                                                                      

 

       Malignant        Problem Active               2021               Me

moria



       neoplasm                                    22:17:41               l



       of skin of                                                         Donny

n



       upper limb Malignant                                                  



       (disorder) neoplasm                                                  



              of skin of                                                  



              upper limb                                                  



              (disorder)                                                  



                                                                      



                                                                      



               Active                                                  



                                                                      



                                                                      



                                                                      



                                                                      



              Problem                                                  



                                                                      



                                                                      



              2021                                                  



                                                                      



                                                                      



                                                                      



                                                                      



                                                                    



              Medical                                                  



              Group,                                                  



              [facility]                                                  



              Land,                                                  



              [place]                                                  



                                                                      



                                                                      

 

       Morbid        Problem Active               2021               [facility]or

ia



       obesity                                    22:17:41               l



       (disorder)   Morbid                                                  Herm

daryl



              obesity                                                  



              (disorder)                                                  



                                                                      



                                                                      



               Active                                                  



                                                                      



                                                                      



                                                                      



                                                                      



              Problem                                                  



                                                                      



                                                                      



              2021                                                  



                                                                      



                                                                      



                                                                      



                                                                      



                                                                    



              Medical                                                  



              Group,                                                  



              [facility]                                                    



              [facility],                                                  



              [facility]                                                  



              Land,                                                  



              [facility]                                                     



              [facility]                                                  



              Trace,[facility]                                                  



              [place]                                                  



              Bone &                                                  



              Joint,                                                  



              [place]                                                  



                                                                      



                                                                      

 

       Post-disch        Problem Active               2021               M

emoria



       arge                                      22:17:41               l



       follow-up                                                         [place]



       (finding) Post-disch                                                  



              arge                                                    



              follow-up                                                  



              (finding)                                                  



                                                                      



                                                                      



              Active                                                  



                                                                      



                                                                      



                                                                      



                                                                      



              Problem                                                  



                                                                      



                                                                      



              2021                                                  



                                                                      



                                                                      



                                                                      



                                                                      



                                                                    



              Medical                                                  



              Group,                                                  



              [place]                                                  



                                                                      



                                                                      

 

       Prerenal        Problem Active               2021               Mem

oria



       azotemia                                    22:17:41               l



       (disorder)   Prerenal                                                  He

rmann



              azotemia                                                  



              (disorder)                                                  



                                                                      



                                                                      



               Active                                                  



                                                                      



                                                                      



                                                                      



                                                                      



              Problem                                                  



                                                                      



                                                                      



              2021                                                  



                                                                      



                                                                      



                                                                      



                                                                      



                                                                    



              Medical                                                  



              Group,                                                  



              [facility]                                                  



              Land,                                                  



              [place]                                                  



                                                                      



                                                                      

 

       Proteinuri        Problem Active               2021               M

emoria



       a                                         22:17:41               l



       (finding)                                                         [place]



              Proteinuri                                                  



              a                                                       



              (finding)                                                  



                                                                      



                                                                      



              Active                                                  



                                                                      



                                                                      



                                                                      



                                                                      



              Problem                                                  



                                                                      



                                                                      



              2021                                                  



                                                                      



                                                                      



                                                                      



                                                                      



                                                                    



              Medical                                                  



              Group,                                                  



              [facility]                                                  



              [place],                                                  



              [place]                                                  



                                                                      



                                                                      

 

       Stasis        Problem Active               2021               [facility]

ia



       ulcer                                     22:17:41               l



       (disorder)   Stasis                                                  [place]

daryl



              ulcer                                                   



              (disorder)                                                  



                                                                      



                                                                      



               Active                                                  



                                                                      



                                                                      



                                                                      



                                                                      



              Problem                                                  



                                                                      



                                                                      



              2021                                                  



                                                                      



                                                                      



                                                                      



                                                                      



                                                                    



              Medical                                                  



              Group,                                                  



              [facility]                                                  



              [place],                                                  



              [place]                                                  



                                                                      



                                                                      

 

       Swelling -        Problem Active               2021               [facility]

[facility]



       edema -                                    22:17:41               l



       symptom   Swelling                                                  [place]

nn



       (finding) - edema -                                                  



              symptom                                                  



              (finding)                                                  



                                                                      



                                                                      



              Active                                                  



                                                                      



                                                                      



                                                                      



                                                                      



              Problem                                                  



                                                                      



                                                                      



              2021                                                  



                                                                      



                                                                      



                                                                      



                                                                      



                                                                    



              Medical                                                  



              Group,                                                  



              [facility]                                                  



              [place],                                                  



              [place]                                                  



                                                                      



                                                                      

 

       Swollen        Problem Active               2021               [place]

[place]an



       ankle                                     22:17:41               l



       (finding)   Swollen                                                  [place]

daryl



              ankle                                                   



              (finding)                                                  



                                                                      



                                                                      



              Active                                                  



                                                                      



                                                                      



                                                                      



                                                                      



              Problem                                                  



                                                                      



                                                                      



              2021                                                  



                                                                      



                                                                      



                                                                      



                                                                      



                                                                    



              Medical                                                  



              Group,                                                  



              [facility]                                                  



              [place],                                                  



              [place]                                                  



                                                                      



                                                                      

 

       Urinary        Problem Resolve               2021               [facility]

[facility]



       tract                d                    22:17:41               l



       infectious   Urinary                                                  Her

[place]



       disease tract                                                   



       (disorder) infectious                                                  



              disease                                                  



              (disorder)                                                  



                                                                      



                                                                      



               Resolved                                                  



                                                                      



                                                                      



                                                                      



                                                                      



                Problem                                                  



                                                                      



                                                                      



              2021                                                  



                                                                      



                                                                      



                                                                      



                                                                      



                                                                    



              Medical                                                  



              Group,                                                  



              [facility]                                                  



              [place],                                                  



              [place]                                                  



                                                                      



                                                                      

 

       Venous        Problem Active               2021               [facility]

ia



       ulcer of                                    22:17:41               l



       leg      Venous                                                  [place]



       (disorder) ulcer of                                                  



              leg                                                     



              (disorder)                                                  



                                                                      



                                                                      



               Active                                                  



                                                                      



                                                                      



                                                                      



                                                                      



              Problem                                                  



                                                                      



                                                                      



              2021                                                  



                                                                      



                                                                      



                                                                      



                                                                      



                                                                    



              Medical                                                  



              Group,                                                  



              [facility]                                                  



              [facility],                                                  



              [place]                                                  



                                                                      



                                                                      

 

       Weight        Problem Active               2021               [facility]

ia



       gain                                      22:17:41               l



       finding   Weight                                                  [place]



       (finding) gain                                                    



              finding                                                  



              (finding)                                                  



                                                                      



                                                                      



              Active                                                  



                                                                      



                                                                      



                                                                      



                                                                      



              Problem                                                  



                                                                      



                                                                      



              2021                                                  



                                                                      



                                                                      



                                                                      



                                                                      



                                                                    



              Medical                                                  



              Group,                                                  



              [facility]                                                  



              [place],                                                  



              [place]                                                  



                                                                      



                                                                      

 

       Acute         Problem Active               2016               [facility]

ia



       renal                                     00:23:22               l



       failure   Acute                                                  [place]



       syndrome renal                                                   



       (disorder) failure                                                  



              syndrome                                                  



              (disorder)                                                  



                                                                      



                                                                      



               Active                                                  



                                                                      



                                                                      



                                                                      



                                                                      



              Problem                                                  



                                                                      



                                                                      



              2016                                                  



                                                                      



                                                                      



               Data                                                   



              migrated                                                  



              from [facility]                                                  



              on                                                      



              5/30/15.                                                  



                                                                      



                                                                      



               [facility]                                                  



              [facility],                                                  



              [facility]                                                  



              [place],[facility]                                                     



              [facility]                                                  



              [facility]                                                  



              [place]                                                  



              [facility]                                                  



              [facility]                                                   



                                                                      



                                                                      

 

       Kidney        Problem Active               2017               [facility]

ia



       disease                                    03:07:28               l



       (disorder)   Kidney                                                  [facility]

daryl



              disease                                                  



              (disorder)                                                  



                                                                      



                                                                      



               Active                                                  



                                                                      



                                                                      



                                                                      



                                                                      



              Problem                                                  



                                                                      



                                                                      



              2017                                                  



                                                                      



                                                                      



                                                                      



                                                                      



                                                                    



              [place]                                                  



                                                                      



                                                                      

 

       Migraine        Problem Active               2017               [facility]

[facility]



       (disorder)                                    03:07:28               l



                Migraine                                                  [place]

n



              (disorder)                                                  



                                                                      



                                                                      



               Active                                                  



                                                                      



                                                                      



                                                                      



                                                                      



              Problem                                                  



                                                                      



                                                                      



              2017                                                  



                                                                      



                                                                      



                                                                      



                                                                      



                                                                      



              [facility]                                                  



              [facility]                                                  



              [facility]                                                  



              [facility] [place],                                                  



              [place]                                                  



                                                                      



                                                                      

 

       RIGHT         Diagnosis Active               2016               [facility]

[facility]



       WRIST                                     15:06:00               l



       DISTAL   RIGHT                                                  [place]



       RADIUS WRIST                                                   



       CLSD FX DISTAL                                                  



              RADIUS                                                  



              CLSD FX                                                  



                                                                      



                                                                      



              Active                                                  



                                                                      



                                                                      



                                                                      



                                                                      



                                                                      



                                                                      



                                                                      



                                                                      



                                                                      



                                                                      



                   [facility]                                                  



              [facility]                                                  



              [facility]                                                   



                                                                      



                                                                      

 

       DISTAL        Diagnosis Active               2016               [facility]

[facility]



       RADIUS                                    09:46:00               l



       CLSD FX   DISTAL                                                  [place]



              RADIUS                                                  



              CLSD FX                                                  



                                                                      



                                                                      



              Active                                                  



                                                                      



                                                                      



                                                                      



                                                                      



                                                                      



                                                                      



                                                                      



                                                                      



                                                                      



                                                                      



                                                                     



              [facility]                                                     



              [facility]                                                  



              Trace                                                   



                                                                      



                                                                      

 

       Cholestero        Problem                      2016               [facility]

[facility]



       l                                         05:02:18               l



       (substance                                                         [place]

n



       )      Cholestero                                                  



              l                                                       



              (substance                                                  



              )                                                       



                                                                      



                                                                      



                                                                      



                                                                      



                                                                      



                                                                      



              Problem                                                  



                                                                      



                                                                      



              2016                                                  



                                                                      



                                                                      



                                                                      



                                                                      



                                                                      



              [facility]                                                  



              [facility]                                                  



              [facility]                                                  



               [facility]                                                  



              [facility]                                                    



                                                                      



                                                                      

 

       Sleep         Problem                      2016               [facility]

ia



       apnea                                     05:02:18               l



       (finding)   Sleep                                                  [facility]

n



              apnea                                                   



              (finding)                                                  



                                                                      



                                                                      



                                                                      



                                                                      



                                                                      



                                                                      



                                                                      



              Problem                                                  



                                                                      



                                                                      



              2016                                                  



                                                                      



                                                                      



               2does not                                                  



              use cpap                                                  



                                                                      



                                                                      



              Surgical                                                  



              Specialty                                                  



              [facility]                                                  



              [facility]                                                    



                                                                      



                                                                      

 

       Dependence        Problem        2021            

   [facility]



       on                             01:29:16 01:29:16               l



       supplement                      21:17:                             [facility]

n



       al oxygen Dependence               00                                 



              on                                                      



              supplement                                                  



              al oxygen                                                  



                                                                      



                                                                      



                                                                      



                                                                      



                                                                      



              2021                                                  



                                                                      



                                                                      



                                                                      



                                                                      



                                                                    



              Medical                                                  



              Group                                                   



                                                                      



                                                                      

 

       Other         Problem        2021               [facility]

[facility]



       hyperlipid                         01:29:16 01:29:16               l



       emia     Other               21:16:                             [place]



              hyperlipid               00                                 



              emia                                                    



                                                                      



                                                                      



                                                                      



                                                                      



              2021                                                  



                                                                      



                                                                      



                                                                      



                                                                      



                                                                    



              Medical                                                  



              Group                                                   



                                                                      



                                                                      

 

       Unsteadine        Problem        2021            

   [facility]



       ss on feet                         01:29:16 01:29:16               l



                                   21:13:                             [facility]



              Unsteadine               00                                 



              ss on feet                                                  



                                                                      



                                                                      



                                                                      



                                                                      



                                                                      



              2021                                                  



                                                                      



                                                                      



                                                                      



                                                                      



                                                                    



              Medical                                                  



              Group                                                   



                                                                      



                                                                      

 

       Weakness        Problem        2021              

 [facility]



                                      01:29:16 01:29:16               l



                Weakness               21:13:                             [facility]

n



                                   00                                 



                                                                      



                                                                      



                                                                      



                                                                      



              2021                                                  



                                                                      



                                                                      



                                                                      



                                                                      



                                                                    



              Medical                                                  



              Group                                                   



                                                                      



                                                                      

 

       Essential        Problem        2021             

  [facility]



       (primary)                         01:29:16 01:29:16               l



       hypertensi                      21:12:                             [place] martinez



       on     Essential               00                                 



              (primary)                                                  



              hypertensi                                                  



              on                                                      



                                                                      



                                                                      



                                                                      



                                                                      



              2021                                                  



                                                                      



                                                                      



                                                                      



                                                                      



                                                                    



              Medical                                                  



              Group                                                   



                                                                      



                                                                      

 

       Other long        Problem        2021            

   [facility]



       term                           22:39:43 22:39:43               l



       (current)   Other               22:29:                             [facility]

n



       drug   long term               00                                 



       therapy (current)                                                  



              drug                                                    



              therapy                                                  



                                                                      



                                                                      



                                                                      



                                                                      



              2021                                                  



                                                                      



                                                                      



                                                                      



                                                                      



                                                                    



              Medical                                                  



              Group                                                   



                                                                      



                                                                      

 

       Other         Problem        2021               SUSIE gonzalez



       abnormal                         22:39:43 22:39:43               l



       glucose   Other               22:23:                             [facility]



              abnormal               00                                 



              glucose                                                  



                                                                      



                                                                      



                                                                      



                                                                      



              2021                                                  



                                                                      



                                                                      



                                                                      



                                                                      



                                                                    



              Medical                                                  



              Group                                                   



                                                                      



                                                                      

 

       Acute         Problem Resolve -2016               

[facility]



       otitis               d      -24   00:23:22 00:23:22               l



       media    Acute               00:00:                             [facility]



       (disorder) otitis               00                                 



              media                                                   



              (disorder)                                                  



                                                                      



                                                                      



               Resolved                                                  



                                                                      



                                                                      



              2013                                                  



                                                                      



                                                                      



               Problem                                                  



                                                                      



                                                                      



              2016                                                  



                                                                      



                                                                      



               Data                                                   



              migrated                                                  



              from [facility]                                                  



              on                                                      



              7/18/15.                                                  



                                                                      



                                                                      



               ILA Zarate,                                                  



              [facility]                                                  



              [place],[facility]                                                     



              [place]                                                  



              [facility]                                                  



              Bone &                                                  



              Joint                                                   



                                                                      



                                                                      







Allergies, Adverse Reactions, Alerts







       Allergy Allergy Status Severity Reaction(s) Onset  Inactive Treating Comm

ents 

Source



       Name   Type                        Date   Date   Clinician        

 

       penicill penicill Active                                           [facility]

a



       ins<sup> ins<sup>                                                  l



       1</sup> 1</sup>                                                  [facility]

 

       codeine< codeine< Active                                           [facility]

a



       sup>1</s sup>1</s                                                  l



       up>    up>                                                     [facility]

 

       cefepime cefepime Active                                           [facility]

a



                                                                      l



                                                                      [facility]

 

       Levaquin Levaquin Active                                           [facility]

a



                                                                      l



                                                                      [place]

 

       penicill penicill Active                                           [facility]

a



       ins<sup> ins<sup>                                                  l



       2</sup> 2</sup>                                                  [facility]

 

       codeine codeine Active                                           [facility]



                                                                      l



                                                                      [facility]

 

       penicill penicill Active                                           [facility]

a



       ins    ins                                                     l



                                                                      [place]







Social History







           Social Habit Start Date Stop Date  Quantity   Comments   Source

 

           Social History 2019-10-25 2019-10-25                       Doreen nguyen



                      14:54:48   14:54:48                         









                Smoking Status  Start Date      Stop Date       Source

 

                Social History                                  [facility]







Medications







       Ordered Filled Start  Stop   Current Ordering Indication Dosage Frequency

 Signature

                    Comments            Components          Source



     Medication Medication Date Date Medication? Clinician                (SIG) 

          



     Name Name                                                   

 

     Mupirocin      2021      Yes                      1 appl,           [facility]

ia



     0.02 MG/MG      1-22                               TOP, TID,           l



     Topical      23:21:                               X 5 day, #           [facility]

[facility]



     Ointment      00                                 22 gm, 0           



                                                  Refill(s),           



                                                  Pharmacy:           



                                                  [facility]           



                                                  [facility]           



                                                  #[id number],           



                                                  165.1, cm,           



                                                  21           



                                                  14:08:00           



                                                  CST,           



                                                  Height,           



                                                  136.42,           



                                                  kg,            



                                                  21           



                                                  14:08:00           



                                                  CST,           



                                                  Weight           

 

     bumetanide      2021      Yes                      2 mg = 1           [facility]

[facility]



     2 mg oral      1-19                               tab, PO,           l



     tablet      21:11:                               Daily, #           [place]



               00                                 30 tab, 0           



                                                  Refill(s)           

 

     allopurinol      2021      Yes                      100 mg = 1           

[facility]



     100 mg oral      1-19                               tab, PO,           l



     tablet      21:10:                               BID, # 60           [facility]

n



               00                                 tab, 0           



                                                  Refill(s)           

 

     gabapentin      2021      Yes                      200 mg = 2           [facility]

[facility]



     100 MG Oral      1-19                               cap, PO,           l



     Capsule      21:10:                               Bedtime, 0           [facility]

[facility]



               00                                 Refill(s)           

 

     QUEtiapine            Yes                      1 tablet,           [facility]

[facility]



     50 mg oral      3-30                               once a           l



     tablet      13:55:                               day, 0           [facility]



               00                                 Refill(s)           

 

     torsemide            Yes                      1 tablet,           [facility]

[facility]



     20 mg oral      3-30                               twice a           l



     tablet      13:55:                               day, 0           [place]



               00                                 Refill(s)           

 

     potassium            Yes                      1 tablet,           [facility]

[facility]



     chloride 20      3-30                               once a           l



     mEq oral      13:54:                               day, 0           [facility]



     tablet,      00                                 Refill(s)           



     extended                                                        



     release                                                        



     (KCL)                                                        

 

     allopurinol            Yes                      1/2            [facility]

a



     300 mg oral      3-30                               tablet,           l



     tablet      13:53:                               once a           [facility]



               00                                 day, 0           



                                                  Refill(s)           

 

     carvedilol            Yes                      1 tablet,           [facility]

[facility]



     3.125 mg      3-30                               twice a           l



     oral tablet      13:53:                               day, 0           [facility]

[facility]



               00                                 Refill(s)           

 

     Digoxin            Yes                      1 tablet,           [facility]

ia



     0.125 MG      3-30                               once a           l



     Oral Tablet      13:53:                               day, 0           [facility]

[facility]



               00                                 Refill(s)           

 

     DULoxetine            Yes                      1 capsule,           [facility]

[facility]



     60 mg oral      3-30                               once a           l



     delayed      13:53:                               day, 0           [place]



     release      00                                 Refill(s)           



     capsule                                                        

 

     apixaban 5            Yes                      1 tablet,           Me

[facility]



     MG Oral      3-30                               twice a           l



     Tablet      13:53:                               day, 0           [facility]



     [Eliquis]      00                                 Refill(s)           

 

     gabapentin            Yes                      1 capsule,           M

[facility]



     300 MG Oral      3-30                               twice a           l



     Capsule      13:53:                               day, 0           [facility]



               00                                 Refill(s)           

 

     metoprolol            Yes                      1 tablet,           Me

[facility]



     tartrate 50      3-30                               Twice a           l



     mg oral      13:53:                               day, 0           [facility]



     tablet      00                                 Refill(s)           

 

     midodrine 5            Yes                      1 tablet,           M

[facility]



     mg oral      3-30                               twice a           l



     tablet      13:53:                               day, 0           [place]



               00                                 Refill(s)           

 

     DULoxetine      2020      Yes                      60 mg = 1           Me

[facility]



     60 mg oral      1-25                               cap, PO,           l



     delayed      17:17:                               Daily, #           [facility]

n



     release      00                                 30 cap, 0           



     capsule                                         Refill(s)           

 

     Spironolact      2020      Yes                      25 mg = 1           [facility]

[facility]



     one 25 MG      1-25                               tab, PO,           l



     Oral Tablet      17:17:                               Daily, 0           [facility]

[facility]



     [Aldactone]      00                                 Refill(s)           

 

     Digoxin      2020      Yes                      0.125 mg,           [facility]

ia



     0.125 MG      1-25                               PO, Daily,           l



     Oral Tablet      17:13:                               # 30 tab,           [facility]

[facility]



               00                                 0              



                                                  Refill(s)           

 

     Mupirocin      2020      Yes                      See            [facility]



     0.02 MG/MG      0-13                               Instructio           l



     Topical      15:44:                               ns, APPLY           [facility]

nn



     Ointment      00                                 EXTERNALLY           



                                                  TO THE           



                                                  AFFECTED           



                                                  AREA TWICE           



                                                  DAILY, #           



                                                  66 gm, 1           



                                                  Refill(s),           



                                                  Pharmacy:           



                                                  [facility]           



                                                  [facility]           



                                                  #[id number],           



                                                  170.18,           



                                                  cm,            



                                                  20           



                                                  14:42:00           



                                                  CST,           



                                                  Height,           



                                                  164.318,           



                                                  kg,            



                                                  20           



                                                  14:42:00           



                                                  CST,           



                                                  Weight           

 

     Nitrofurant            Yes                      100 mg = 1           

[facility]



     oin 100 MG      8-09                               cap, PO,           l



     Oral      17:57:                               BID, X 10           [place]



     Capsule      00                                 day, # 20           



     [Macrobid]                                         cap, 0           



                                                  Refill(s),           



                                                  Pharmacy:           



                                                  [facility]           



                                                  #[id number],           



                                                  170.18,           



                                                  cm,            



                                                  20           



                                                  14:42:00           



                                                  CST,           



                                                  Height,           



                                                  164.318,           



                                                  kg,            



                                                  20           



                                                  14:42:00           



                                                  CST,           



                                                  Weight           

 

     lisinopril      2020-0      Yes                      2.5 mg = 1           M

[facility]



     2.5 mg oral      6-04                               tab, PO,           l



     tablet      23:26:                               Daily, #           [place]



               00                                 30 tab, 2           



                                                  Refill(s),           



                                                  called to           



                                                  pharmacy           

 

     calcitriol      2020-0      Yes                      = 1 cap,           [facility]

[facility]



     0.25 mcg      5-20                               PO, Daily,           l



     oral      12:18:                               # 90 cap,           [place]



     capsule      00                                 1              



                                                  Refill(s),           



                                                  Pharmacy:           



                                                  [facility]           



                                                  #[id number]           

 

     calcitriol      2020-0      Yes                      = 1 cap,           [facility]

[facility]



     0.25 mcg      4-16                               PO, Daily,           l



     oral      16:37:                               # 90           [place]



     capsule      47                                 unknown           



                                                  unit,           



                                                  Pharmacy:           



                                                  [facility]           



                                                  #[id number]           

 

     Furosemide      -0      Yes                      = 1 tab,           [facility]

[facility]



     40 MG Oral      4-13                               PO, Every           l



     Tablet      20:06:                               Other Day,           [facility]

nn



               19                                 # 45 tab,           



                                                  Pharmacy:           



                                                  [facility]           



                                                  #[id number]           

 

     prednisolon      2020-0      Yes                      1 drop in           M

[facility]



     e acetate      4-10                               each eye,           l



     10 MG/ML      20:53:                               once           [place]



     Ophthalmic      00                                 daily, 0           



     Suspension                                         Refill(s)           

 

     bromfenac      -0      Yes                      1 drop in           [facility]

[facility]



     0.7 MG/ML      4-10                               right eye,           l



     Ophthalmic      20:53:                               once           [place]



     Solution      00                                 daily, 0           



     [Prolensa]                                         Refill(s)           

 

     Furosemide      -0      Yes                      = 1 tab,           [facility]

[facility]



     40 MG Oral      1-23                               PO, Every           l



     Tablet      15:13:                               Other Day,           [facility]

nn



               34                                 # 45 tab,           



                                                  Pharmacy:           



                                                  [facility]           



                                                  #[id number]           

 

     Mupirocin      2019      Yes                      See            [facility]



     0.02 MG/MG      2-27                               Instructio           l



     Topical      19:11:                               ns, # 66           [facility]

n



     Ointment      15                                 gm, APPLY           



                                                  EXTERNALLY           



                                                  TO THE           



                                                  AFFECTED           



                                                  AREA TWICE           



                                                  DAILY,           



                                                  Pharmacy:           



                                                  [facility]           



                                                  #[id number]           

 

     Nitrofurant      2019      Yes                      100 mg = 1           

[facility]



     oin 100 MG      2-13                               cap, PO,           l



     Oral      01:44:                               BID, X 5           [place]



     Capsule      00                                 day, # 10           



     [Macrodanti                                         cap, 0           



     n]                                           Refill(s),           



                                                  Pharmacy:           



                                                  [facility]           



                                                  #[id number]           

 

     apixaban 5      2019      Yes                      5 mg, PO,           Me

[facility]



     MG Oral      0-25                               Q12H, 0           l



     Tablet      15:07:                               Refill(s)           [facility]

n



     [Eliquis]      00                                                

 

     metoprolol      2019      Yes                      50 mg = 1           Me

[facility]



     tartrate 50      0-25                               tab, PO,           l



     mg oral      15:07:                               BID, # 180           [facility]

[facility]



     tablet      00                                 tab, 0           



                                                  Refill(s)           

 

     Diltiazem      2019      Yes                      180 mg = 1           Me

[facility]



     Hydrochlori      0-25                               cap, PO,           l



     de       15:07:                               Daily, #           [facility]

[facility]



     mg/24 hours      00                                 30 cap, 0           



     oral                                         Refill(s)           



     capsule,                                                        



     extended                                                        



     release                                                        

 

     tramadol      2019      Yes                      150 mg = 1           [facility]

[facility]



     150 mg/24      0-25                               cap, PO,           l



     hours oral      15:07:                               Daily, 0           Her

hern[facility]



     capsule,      00                                 Refill(s)           



     extended                                                        



     release                                                        

 

     Acetaminoph      2019      Yes                      1 tab, PO,           

[facility]



     en 325 MG /      0-25                               Q6H, 0           l



     Hydrocodone      15:07:                               Refill(s)           H

[facility]



     Bitartrate      00                                                



     5 MG Oral                                                        



     Tablet                                                        



     [Norco                                                        



     5/325]                                                        

 

     calcitriol      2019      Yes                      = 1 cap,           [facility]

[facility]



     0.25 mcg      0-11                               PO, Daily,           l



     oral      21:10:                               # 90           [place]



     capsule      30                                 unknown           



                                                  unit,           



                                                  Pharmacy:           



                                                  [facility]           



                                                  [facility]           



                                                  #[id number]           

 

     gabapentin            Yes                      300 mg = 1           [facility]

[facility]



     300 MG Oral      9-27                               cap, PO,           l



     Capsule      20:54:                               BID, # 180           [facility]

[facility]



               00                                 cap, 3           



                                                  Refill(s),           



                                                  called to           



                                                  pharmacy           

 

     allopurinol            Yes                      = 1 tab,           [facility]

[facility]



     300 mg oral      8-17                               PO, Daily,           l



     tablet      02:39:                               # 90 tab,           [facility]

n



               31                                 Pharmacy:           



                                                  [facility]           



                                                  [facility]           



                                                  #[id number]           

 

     QUEtiapine            Yes                      50 mg = 1           Me

[facility]



     50 mg oral      6-06                               tab, PO,           l



     tablet      15:28:                               Bedtime, #           [facility]

nn



               00                                 30 tab, 1           



                                                  Refill(s)           

 

     eletriptan            Yes                      40 mg = 1           Me

[facility]



     40 mg oral      6-06                               tab, PO,           l



     tablet      15:26:                               Daily, PRN           [facility]

nn



               00                                 for            



                                                  migraine           



                                                  headache,           



                                                  may repeat           



                                                  dose once           



                                                  in 2           



                                                  hours, # 6           



                                                  tab, 0           



                                                  Refill(s)           

 

     atorvastati            Yes                      See            [facility]

a



     n 40 mg      3-14                               Instructio           l



     oral tablet      15:07:                               ns, TAKE 1           

[place]



               35                                 TABLET BY           



                                                  MOUTH           



                                                  EVERY           



                                                  NIGHT AT           



                                                  BEDTIME, #           



                                                  90 tab, 1           



                                                  Refill(s),           



                                                  Pharmacy:           



                                                  [facility]           



                                                  [facility]           



                                                  [id number]           

 

     amLODIPine            Yes                      See            [facility]



     5 mg oral      3-14                               Instructio           l



     tablet      15:07:                               ns, TAKE 1           [facility]

nn



               33                                 TABLET BY           



                                                  MOUTH           



                                                  EVERY DAY,           



                                                  # 90 tab,           



                                                  1              



                                                  Refill(s),           



                                                  Pharmacy:           



                                                  [facility]           



                                                  [facility]           



                                                  [id number]           

 

     lisinopril            Yes                      See            [facility]



     5 mg oral      8-21                               Instructio           l



     tablet      19:00:                               ns, TAKE 1           [facility]

nn



               30                                 TABLET BY           



                                                  MOUTH           



                                                  DAILY, #           



                                                  90 tab, 1           



                                                  Refill(s),           



                                                  Pharmacy:           



                                                  [facility]           



                                                  [facility]           



                                                  [id number]           

 

     atorvastati            Yes                      See            [facility]

a



     n 40 mg      8-21                               Instructio           l



     oral tablet      18:50:                               ns, TAKE 1           

[place]



               45                                 TABLET BY           



                                                  MOUTH           



                                                  EVERY           



                                                  NIGHT AT           



                                                  BEDTIME, #           



                                                  30 tab, 5           



                                                  Refill(s),           



                                                  Pharmacy:           



                                                  [facility]           



                                                  [facility]           



                                                  [id number]           

 

     amLODIPine            Yes                      See            [facility]



     5 mg oral      8-21                               Instructio           l



     tablet      18:50:                               ns, TAKE 1           [facility]

nn



               41                                 TABLET BY           



                                                  MOUTH           



                                                  EVERY DAY,           



                                                  # 30 tab,           



                                                  5              



                                                  Refill(s),           



                                                  Pharmacy:           



                                                  [facility]           



                                                  [facility]           



                                                  [id number]           

 

     lisinopril            No                       See            [facility]



     5 mg oral      7-31                               Instructio           l



     tablet      15:28:                               ns, # 90           [place]



               34                                 tab, TAKE           



                                                  1 TABLET           



                                                  BY MOUTH           



                                                  DAILY,           



                                                  Pharmacy:           



                                                  [facility]           



                                                  [facility]           



                                                  [id number]           

 

     allopurinol            No                       300 mg = 1           

[facility]



     300 mg oral      5-10                               tab, PO,           l



     tablet      13:59:                               Daily, #           [place]



               00                                 90 tab, 1           



                                                  Refill(s),           



                                                  Pharmacy:           



                                                  [facility]           



                                                  [facility]           



                                                  [id number]           

 

     lisinopril            No                       See            [facility]



     5 mg oral      5-01                               Instructio           l



     tablet      12:38:                               ns, # 90           [place]



               18                                 tab, TAKE           



                                                  1 TABLET           



                                                  BY MOUTH           



                                                  DAILY,           



                                                  Pharmacy:           



                                                  [facility]           



                                                  [facility]           



                                                  [id number]           

 

     ALLOPURINOL            Yes                      See            [facility]

a



     300MG      5-01                               Instructio           l



     TABLETS      12:38:                               ns, # 90           Donny

n



               18                                 tab, TAKE           



                                                  1 TABLET           



                                                  BY MOUTH           



                                                  DAILY,           



                                                  Pharmacy:           



                                                  [facility]           



                                                  [facility] [address]           



                                                  [id number]           

 

     calcitriol      2018-0      Yes                      0.25           [facility]



     0.25 mcg      3-28                               microgram           l



     oral      13:49:                               = 1 cap,           [facility]



     capsule      00                                 PO, Daily,           



                                                  # 90 cap,           



                                                  3              



                                                  Refill(s),           



                                                  Pharmacy:           



                                                  [facility]           



                                                  Drug Store           



                                                  [facility]           

 

     Mupirocin            Yes                      1 appl,           [facility]

ia



     0.02 MG/MG      3-21                               TOP, BID,           l



     Topical      15:37:                               30 grams           Donny

n



     Ointment      21                                 3each, # 3           



                                                  ea, 1           



                                                  Refill(s),           



                                                  Pharmacy:           



                                                  [facility]           



                                                  Drug Store           



                                                  [facility]           

 

     atorvastati            Yes                      See            [facility]

a



     n 40 mg      3-21                               Instructio           l



     oral tablet      15:36:                               ns, TAKE 1           

[facility]



               22                                 TABLET BY           



                                                  MOUTH           



                                                  EVERY           



                                                  NIGHT AT           



                                                  BEDTIME, #           



                                                  30 tab, 5           



                                                  Refill(s),           



                                                  Pharmacy:           



                                                  [facility]           



                                                  [facility]           



                                                  [facility]           

 

     amLODIPine            Yes                      See            [facility]



     5 mg oral      3-21                               Instructio           l



     tablet      15:36:                               ns, TAKE 1           [facility]

nn



               18                                 TABLET BY           



                                                  MOUTH           



                                                  EVERY DAY,           



                                                  # 30 tab,           



                                                  5              



                                                  Refill(s),           



                                                  Pharmacy:           



                                                  [facility]           



                                                  Drug Store           



                                                  [facility]           

 

     aspirin 81            Yes                      81 mg = 1           Me

moria



     mg tablet,      1-24                               tab, PO,           l



     enteric      16:34:                               Daily, #           Donny

n



     coated      00                                 90 tab, 3           



                                                  Refill(s)           

 

     Xopenex            No   Ghassan K                0.63 mg =           Mem

oria



     0.63 mg/3      3-11           Chun                3 mL,           l



     mL        01:00:                               Soln, NEB,           [facility]



     inhalation      00                                 Once,           



     solution                                         first dose           



                                                  03/10/16           



                                                  19:00:00           



                                                  CST, stop           



                                                  date           



                                                  03/10/16           



                                                  19:00:00           



                                                  CST            

 

     Misc            No   Deuce                300 mL,           [facility]



     Medication      3-11           Wheat                Soln-IV,           l



               00:03:                               IV, Once,           [place]



               00                                 first dose           



                                                  03/10/16           



                                                  18:03:00           



                                                  CST, stop           



                                                  date           



                                                  03/10/16           



                                                  18:03:00           



                                                  CST            

 

     promethazin            No   Ghassan K                12.5 mg =          

 [facility]



     e         3-11           Chun                0.5 mL,           l



               00:02:                               Injection,           [facility]



               00                                 IM, Once           



                                                  PRN for           



                                                  severe           



                                                  nausea,           



                                                  first dose           



                                                  03/10/16           



                                                  18:02:00           



                                                  CST            

 

     albuterol            No   Ghassan K                2.5 mg = 3           

[facility]



     2.5 mg/3 mL      3-11           Chun                mL, Soln,           

l



     (0.083%)      00:02:                               NEB, Once           [facility]

daryl



     inhalation      00                                 PRN for           



     solution                                         wheezing,           



                                                  first dose           



                                                  03/10/16           



                                                  18:02:00           



                                                  CST            

 

     Demerol HCl            No   Ghassan K                12.5 mg =          

 [facility]



               3-11           Chun                0.25 mL,           l



               00:02:                               Injection,           [facility]



               00                                 IV Push,           



                                                  Once PRN           



                                                  for            



                                                  shivers,           



                                                  first dose           



                                                  03/10/16           



                                                  18:02:00           



                                                  CST            

 

     ondansetron            No   Ghassan K                4 mg = 2           

[facility]



               3-11           Chun                mL,            l



               00:02:                               Injection,           [facility]



               00                                 IV Push,           



                                                  q15min PRN           



                                                  for            



                                                  nausea/vom           



                                                  iting,           



                                                  order           



                                                  duration:           



                                                  2 doses,           



                                                  first dose           



                                                  03/10/16           



                                                  18:02:00           



                                                  CST, stop           



                                                  date           



                                                  Limited #           



                                                  of times           

 

     Dilaudid            No   Ghassan K                0.5 mg =           Mem

oria



               3-11           Chun                0.25 mL,           l



               00:02:                               Injection,           [facility]



               00                                 IV Push,           



                                                  q10min PRN           



                                                  for pain           



                                                  severe           



                                                  (7-10),           



                                                  first dose           



                                                  03/10/16           



                                                  18:02:00           



                                                  CST            

 

     diphenhydrA            No   Ghassan K                25 mg =           M

emoria



     MINE      3-11           Chun                0.5 mL,           l



               00:02:                               Injection,           [facility]



               00                                 IV Push,           



                                                  Once PRN           



                                                  for            



                                                  itching,           



                                                  first dose           



                                                  03/10/16           



                                                  18:02:00           



                                                  CST            

 

     LR 1,000 mL            No   Ghassan K                1,000 mL,          

 [facility]



               3-11           Chun                IV, 75           l



               00:02:                               mL/hr,           [facility]



                                                start date           



                                                  03/10/16           



                                                  18:02:00           



                                                  CST            

 

     Saline Lock            No   Ghassan K                10 mL,           Me

moria



     Flush      3-11           Chun                Soln, IV           l



               00:02:                               Push, As           [facility]



               00                                 Indicated           



                                                  PRN for           



                                                  flush,           



                                                  first dose           



                                                  03/10/16           



                                                  18:02:00           



                                                  CST            

 

     Bupivacaine            No   Ghassan K                300 mL,           M

[facility]oria



     0.25% 300      3-11           Chun                Nerve           l



     mL pump 300      00:02:                               Block, 5           He

rmann



     mL        00                                 mL/hr,           



                                                  start date           



                                                  03/10/16           



                                                  18:02:00           



                                                  CST            

 

     Misc            No   Deuce                1,000 mL,           [facility]

a



     Medication      3-10           [place]heat                Soln-IV,           l



               23:42:                               IV, Once,           [place]



               00                                 first dose           



                                                  03/10/16           



                                                  17:42:00           



                                                  CST, stop           



                                                  date           



                                                  03/10/16           



                                                  17:42:00           



                                                  CST            

 

     fentaNYL            No   Deuce                25 mcg =           Mem

oria



               3-10           [place]                0.5 mL,           l



               23:20:                               Injection,           [place]



               00                                 IV, Once,           



                                                  first dose           



                                                  03/10/16           



                                                  17:20:00           



                                                  CST, stop           



                                                  date           



                                                  03/10/16           



                                                  17:20:00           



                                                  CST            

 

     ondansetron            No   Deuce                4 mg = 2           

[facility]



               3-10           [place]                mL,            l



               23:03:                               Injection,           [place]



               00                                 IV, Once,           



                                                  first dose           



                                                  03/10/16           



                                                  17:03:00           



                                                  CST, stop           



                                                  date           



                                                  03/10/16           



                                                  17:03:00           



                                                  CST            

 

     fentaNYL            No   Deuce                25 mcg =           Mem

oria



               3-10           [place]heat                0.5 mL,           l



               23:00:                               Injection,           [place]



               00                                 IV, Once,           



                                                  first dose           



                                                  03/10/16           



                                                  17:00:00           



                                                  CST, stop           



                                                  date           



                                                  03/10/16           



                                                  17:00:00           



                                                  CST            

 

     fentaNYL            No   Deuce                25 mcg =           Mem

oria



               3-10           [place]                0.5 mL,           l



               22:48:                               Injection,           [place]



               00                                 IV, Once,           



                                                  first dose           



                                                  03/10/16           



                                                  16:48:00           



                                                  CST, stop           



                                                  date           



                                                  03/10/16           



                                                  16:48:00           



                                                  CST            

 

     fentaNYL            No   Deuce                25 mcg =           Mem

oria



               3-10           [place]                0.5 mL,           l



               22:37:                               Injection,           [place]



               00                                 IV, Once,           



                                                  first dose           



                                                  03/10/16           



                                                  16:37:00           



                                                  CST, stop           



                                                  date           



                                                  03/10/16           



                                                  16:37:00           



                                                  CST            

 

     dexamethaso            No   Deuce                8 mg = 2           

[facility]



     ne        3-10           [place]                mL,            l



               22:23:                               Injection,           [place]



               00                                 IV, Once,           



                                                  first dose           



                                                  03/10/16           



                                                  16:23:00           



                                                  CST, stop           



                                                  date           



                                                  03/10/16           



                                                  16:23:00           



                                                  CST            

 

     Misc            No   Deuce                1,000 mL,           [facility]

a



     Medication      3-10           [place]                Soln-IV,           l



               22:21:                               IV, Once,           [place]



               00                                 first dose           



                                                  03/10/16           



                                                  16:21:00           



                                                  CST, stop           



                                                  date           



                                                  03/10/16           



                                                  16:21:00           



                                                  CST            

 

     clindamycin            Yes  Deuce                928.125           M

[facility]



               3-10           [place]                mg,            l



               22:18:                               Soln-IV,           [place]



               00                                 IV, Once,           



                                                  first dose           



                                                  03/10/16           



                                                  16:18:00           



                                                  CST, stop           



                                                  date           



                                                  03/10/16           



                                                  16:18:00           



                                                  CST            

 

     fentaNYL            No   Deuce                25 mcg =           Mem

oria



               3-10           [place]                0.5 mL,           l



               22:05:                               Injection,           [place]



               00                                 IV, Once,           



                                                  first dose           



                                                  03/10/16           



                                                  16:05:00           



                                                  CST, stop           



                                                  date           



                                                  03/10/16           



                                                  16:05:00           



                                                  CST            

 

     midazolam            No   Deuce                0.5 mg =           Me

moria



               3-10           [place]                0.5 mL,           l



               22:05:                               Injection,           [place]



               00                                 IV, Once,           



                                                  first dose           



                                                  03/10/16           



                                                  16:05:00           



                                                  CST, stop           



                                                  date           



                                                  03/10/16           



                                                  16:05:00           



                                                  CST            

 

     lidocaine            No   Deuce                3 mL,           [facility]

ia



               3-10           [place]heat                Injection,           l



               21:58:                               IV, Once,           [facility]



               00                                 first dose           



                                                  03/10/16           



                                                  15:58:00           



                                                  CST, stop           



                                                  date           



                                                  03/10/16           



                                                  15:58:00           



                                                  CST            

 

     propofol            No   Deuce                120 mg =           [facility]

[facility]



               3-10           Wheat                12 mL,           l



               21:58:                               Emulsion,           [place]



               00                                 IV, Once,           



                                                  first dose           



                                                  03/10/16           



                                                  15:58:00           



                                                  CST, stop           



                                                  date           



                                                  03/10/16           



                                                  15:58:00           



                                                  CST            

 

     midazolam            No   Deuce                0.5 mg =           [facility]

[facility]



               3-10           Wheat                0.5 mL,           l



               21:50:                               Injection,           [place]



               00                                 IV, Once,           



                                                  first dose           



                                                  03/10/16           



                                                  15:50:00           



                                                  CST, stop           



                                                  date           



                                                  03/10/16           



                                                  15:50:00           



                                                  CST            

 

     fentaNYL            No   Deuce                25 mcg =           [facility]

[facility]



               3-10           [place]heat                0.5 mL,           l



               21:50:                               Injection,           [place]



               00                                 IV, Once,           



                                                  first dose           



                                                  03/10/16           



                                                  15:50:00           



                                                  CST, stop           



                                                  date           



                                                  03/10/16           



                                                  15:50:00           



                                                  CST            

 

     midazolam            No   Deuce                0.5 mg =           [facility]

[facility]



               3-10           Wheat                0.5 mL,           l



               21:10:                               Injection,           [place]



               00                                 IV, Once,           



                                                  first dose           



                                                  03/10/16           



                                                  15:10:00           



                                                  CST, stop           



                                                  date           



                                                  03/10/16           



                                                  15:10:00           



                                                  CST            

 

     fentaNYL            No   Deuce                25 mcg =           [facility]

[facility]



               3-10           Wheat                0.5 mL,           l



               21:10:                               Injection,           [place]



               00                                 IV, Once,           



                                                  first dose           



                                                  03/10/16           



                                                  15:10:00           



                                                  CST, stop           



                                                  date           



                                                  03/10/16           



                                                  15:10:00           



                                                  CST            

 

     fentaNYL            No   Deuce                25 mcg =           [facility]

[facility]



               3-10           Wheat                0.5 mL,           l



               21:05:                               Injection,           [place]



               00                                 IV, Once,           



                                                  first dose           



                                                  03/10/16           



                                                  15:05:00           



                                                  CST, stop           



                                                  date           



                                                  03/10/16           



                                                  15:05:00           



                                                  CST            

 

     midazolam            No   Deuce                0.5 mg =           [facility]

[facility]



               3-10           Wheat                0.5 mL,           l



               21:05:                               Injection,           [place]



               00                                 IV, Once,           



                                                  first dose           



                                                  03/10/16           



                                                  15:05:00           



                                                  CST, stop           



                                                  date           



                                                  03/10/16           



                                                  15:05:00           



                                                  CST            

 

     clindamycin            No   Yoan                900 mg, IV        

   [facility]



               3-10           Lei                Piggyback,           l



               20:00:                               Once,           [place]



               00                                 infuse           



                                                  over 30           



                                                  minutes,           



                                                  first dose           



                                                  03/10/16           



                                                  14:00:00           



                                                  CST, stop           



                                                  date           



                                                  03/10/16           



                                                  14:00:00           



                                                  CST,           



                                                  Prophylaxi           



                                                  s              

 

     LR 1,000 mL            No   Ghassan K                1,000 mL,          

 [facility]



               3-10           Chun                IV, 30           l



               19:27:                               mL/hr,           [place]



               00                                 start date           



                                                  03/10/16           



                                                  13:27:00           



                                                  CST            

 

     Lidocaine            No   Ghassan K                0.2 mL,           [facility]

[facility]



     2% 0.2 mL      3-10           Chun                Injection,           l



     IV Start      19:27:                               Subcutaneo           [facility]

[facility]



     [[facility]]      00                                 us, Once           



                                                  PRN for           



                                                  other (see           



                                                  comment),           



                                                  first dose           



                                                  03/10/16           



                                                  13:27:00           



                                                  CST            

 

     Seroquel            Yes                      100 mg,           [facility]

a



               3-09                               Oral,           l



               14:40:                               Daily, 0           [place]



               00                                 Refill(s),           



                                                  migraines           

 

     acetaminoph            Yes                      1 tabs,           [facility]

[facility]



     en-HYDROcod      3-09                               Oral,           l



     one 325      14:40:                               q6hr, 0           [place]



     mg-5 mg      00                                 Refill(s),           



     oral tablet                                         pain           

 

     traMADol 50      2016-0      Yes                      50 mg = 1           [facility]

[facility]



     mg oral      3-09                               tabs,           l



     tablet      14:40:                               Oral,           [place]



               00                                 q4hr, 0           



                                                  Refill(s),           



                                                  pain           

 

     amLODIPine-            Yes                      1 tabs,           [facility]

[facility]



     atorvastati      3-09                               Oral, qHS,           l



     n 5 mg-40      14:40:                               0              [place]



     mg oral      00                                 Refill(s),           



     tablet                                         cholestero           



                                                  l/hyperten           



                                                  alexx           

 

     Osteo            Yes                      Oral,           [facility]



     Bi-Flex      3-                               Daily, 0           l



               14:40:                               Refill(s),           [place]



               00                                 supplement           

 

     Nature's            Yes                      1,000 mg =           [facility]

[facility]



     Bounty Red      3-09                               2 caps,           l



     Krill Oil      14:40:                               Oral, BID,           [facility]

[facility]



     500 mg oral      00                                 0              



     capsule                                         Refill(s),           



                                                  supplement           

 

     aspirin 81            Yes                      81 mg = 1           [facility]

[facility]



     mg oral      3-09                               tabs,           l



     tablet      14:40:                               Oral,           [place]



               00                                 Daily, 0           



                                                  Refill(s),           



                                                  supplement           

 

     Axert 12.5            Yes                      12.5 mg =           [facility]

[facility]



     mg oral      3-09                               1 tabs,           l



     tablet      14:40:                               Oral,           [place]



               00                                 Once, PRN           



                                                  for            



                                                  migraine           



                                                  headache,           



                                                  may repeat           



                                                  dose once           



                                                  in 2           



                                                  hours, # 6           



                                                  tabs, 0           



                                                  Refill(s),           



                                                  migrainesm           



                                                  ay repeat           



                                                  dose once           



                                                  in 2 hours           

 

     Wellbutrin            Yes                      300 mg,           [facility]

[facility]



     XL        3-09                               Oral,           l



               14:40:                               q24hr, 0           [place]



               00                                 Refill(s),           



                                                  migraines           







Immunizations







           Ordered Immunization Filled Immunization Date       Status     Commen

ts   Source



           Name       Name                                        

 

            influenza            2019-10-23 Completed             [facility]



           vaccine-unspecified<            00:00:00                         [facility]

d[facility]



           sup>1</sup>                                             

 

           pneumococcal            2019 Completed             [facility]



           23-valent             00:00:00                         [place]



           vaccine<sup>3</sup>                                             

 

           Hx influenza            2011-10-25 Completed             [facility]



           vaccine-unspecified<            14:42:42                         [facility]

d[facility]



           sup>1</sup>                                             

 

           Hx influenza            2011-10-25 Completed             [facility]



           vaccine-unspecified<            14:42:42                         [facility]

d[facility]



           sup>2</sup>                                             







Vital Signs







             Vital Name   Observation Time Observation Value Comments     Source

 

             Heart Rate   2021 20:12:00                           [facility]

 [place]

 

             Heart Rate   2021 20:08:00                           [facility]

 [place]

 

             Systolic (mm Hg) 2021 20:08:00                           [facility]

[facility] [place]

 

             Diastolic (mm Hg) 2021 20:08:00                           [facility]

[facility] [place]

 

             Height       2021 20:08:00 165.1 cm                  [facility]

 [place]

 

             Weight       2021 20:08:00                           [facility]

 [place]

 

             BMI Calculated 2021 20:08:00                           [facility]

al [place]

 

             Systolic (mm Hg) 2020 15:59:00                           [facility]

[facility] [place]

 

             Diastolic (mm Hg) 2020 15:59:00                           [facility]

[facility] [place]

 

             Heart Rate   2020 15:59:00                           [facility]

 [place]

 

             Height       2020 15:59:00 165.1 cm                  [facility]

 [place]

 

             Weight       2020 15:59:00                           [facility]

 [place]

 

             BMI Calculated 2020 15:59:00                           [facility]

al [place]

 

             Systolic (mm Hg) 2020 20:42:00                           [facility]

[facility] [place]

 

             Diastolic (mm Hg) 2020 20:42:00                           [facility]

[facility] [place]

 

             Heart Rate   2020 20:42:00                           [facility]

 [place]

 

             Temperature Oral (F) 2020 20:42:00 98.2 F                    

[facility] [place]

 

             Height       2020 20:42:00 170.18 cm                 [facility]

 [place]

 

             Weight       2020 20:42:00                           [facility]

 [place]

 

             BMI Calculated 2020 20:42:00                           [facility]

al [place]

 

             Systolic (mm Hg) 2019 21:53:00                           [facility]

[facility] [place]

 

             Diastolic (mm Hg) 2019 21:53:00                           [facility]

[facility] [place]

 

             Heart Rate   2019 21:53:00                           [facility]

 [place]

 

             Temperature Oral (F) 2019 21:53:00 97.9 F                    

[facility] [place]

 

             Height       2019 21:53:00 165.1 cm                  [facility]

 [place]

 

             Weight       2019 21:53:00                           [facility]

 [place]

 

             BMI Calculated 2019 21:53:00                           [facility]

al [place]

 

             Height       2019-10-25 14:54:00 165.1 cm                  [facility]

 [place]

 

             Weight       2019-10-25 14:54:00                           [facility]

 [place]

 

             BMI Calculated 2019-10-25 14:54:00                           [facility]

al [place]

 

             Systolic (mm Hg) 2019-10-25 14:54:00                           [facility]

[facility] [place]

 

             Diastolic (mm Hg) 2019-10-25 14:54:00                           [facility]

[facility] [place]

 

             Heart Rate   2019-10-25 14:54:00                           [facility]

 [place]

 

             Temperature Oral (F) 2019-10-25 14:54:00 97.6 F                    

[facility] [place]

 

             Systolic (mm Hg) 2019-10-10 15:37:00                           [facility]

[facility] [place]

 

             Diastolic (mm Hg) 2019-10-10 15:37:00                           [facility]

[facility] [place]

 

             Heart Rate   2019-10-10 15:37:00                           [facility]

 [place]

 

             Temperature Oral (F) 2019-10-10 15:37:00 98.2 F                    

[facility] [place]

 

             Height       2019-10-10 15:37:00 165.1 cm                  [facility]

 [place]

 

             Weight       2019-10-10 15:37:00                           [facility]

 [place]

 

             BMI Calculated 2019-10-10 15:37:00                           [facility]

al [place]

 

             Weight       2019 15:18:00                           [facility]

 [place]

 

             BMI Calculated 2019 15:18:00                           [facility]

al [place]

 

             Height       2019 15:18:00 165.1 cm                  [facility]

 [place]

 

             Temperature Oral (F) 2019 15:18:00 98.4 F                    

[facility] [place]

 

             Heart Rate   2019 15:18:00                           [facility]

 [place]

 

             Systolic (mm Hg) 2019 15:18:00                           [facility]

[facility] [place]

 

             Diastolic (mm Hg) 2019 15:18:00                           [facility]

[facility] [place]

 

             Height       2019 19:16:00 165.1 cm                  [facility]

 [place]

 

             BMI Calculated 2019 19:16:00                           [facility]

al [place]

 

             Weight       2019 19:16:00                           [facility]

 [place]

 

             Heart Rate   2019 19:16:00                           [facility]

 [place]

 

             Systolic (mm Hg) 2019 19:16:00                           [facility]

[facility] [place]

 

             Diastolic (mm Hg) 2019 19:16:00                           [facility]

[facility] [place]

 

             Height       2019 14:26:00 167.01 cm                 [facility]

 [place]

 

             BMI Calculated 2019 14:26:00                           [facility]

al [place]

 

             Weight       2019 14:26:00                           [facility]

 [place]

 

             Heart Rate   2019 14:26:00                           [facility]

 [place]

 

             Temperature Oral (F) 2019 14:26:00 97.9 F                    

[facility] [place]

 

             Systolic (mm Hg) 2019 14:26:00                           [facility]

[facility] [place]

 

             Diastolic (mm Hg) 2019 14:26:00                           [facility]

[facility] [place]

 

             Systolic (mm Hg) 2018 18:33:00                           [facility]

[facility] [place]

 

             Diastolic (mm Hg) 2018 18:33:00                           [facility]

[facility] [place]

 

             Heart Rate   2018 18:33:00                           [facility]

 [place]

 

             Weight       2018 18:33:00                           [facility]

 [place]

 

             BMI Calculated 2018 18:31:00                           [facility]

al [place]

 

             Weight       2018 18:31:00                           [facility]

 [place]

 

             Systolic (mm Hg) 2018 18:31:00                           [facility]

[facility] [place]

 

             Diastolic (mm Hg) 2018 18:31:00                           [facility]

[facility] [place]

 

             Height       2018 18:31:00 170.18 cm                 [facility]

 [place]

 

             Temperature Oral (F) 2018 18:31:00 98.3 F                    

[facility] [place]

 

             Heart Rate   2018 18:31:00                           [facility]

 [place]

 

             Weight       2018 16:14:00                           [facility]

 [place]

 

             Height       2018 16:14:00 170.18 cm                 [facility]

 [place]

 

             BMI Calculated 2018 16:14:00                           [facility]

al [place]

 

             Heart Rate   2018 16:14:00                           [facility]

 [place]

 

             Systolic (mm Hg) 2018 16:14:00                           [facility]

[facility] [place]

 

             Diastolic (mm Hg) 2018 16:14:00                           [facility]

[facility] [place]

 

             BMI Calculated 2018 15:06:00                           [facility]

al [place]

 

             Height       2018 15:06:00 170.18 cm                 [facility]

 [place]

 

             Weight       2018 15:06:00                           [facility]

 [place]

 

             Systolic (mm Hg) 2018 15:06:00                           [facility]

[facility] [place]

 

             Diastolic (mm Hg) 2018 15:06:00                           [facility]

[facility] [place]

 

             Heart Rate   2018 15:06:00                           [facility]

 [place]

 

             Temperature Oral (F) 2018 15:06:00 97.9 F                    

[facility] [place]

 

             Weight       2017 16:17:00                           [facility]

 [place]

 

             Height       2017 16:17:00 170.18 cm                 [facility]

 [place]

 

             BMI Calculated 2017 16:17:00                           [facility]

al [place]

 

             Respitory Rate 2016 01:25:00                           [facility]

al [place]

 

             Systolic (mm Hg) 2016 00:50:00                           [facility]

[facility] [place]

 

             Respitory Rate 2016 00:50:00                           [facility]

al [place]

 

             Heart Rate   2016 00:50:00                           [facility]

 [place]

 

             Systolic (mm Hg) 2016 00:40:00                           [facility]

[facility] [place]

 

             Respitory Rate 2016 00:40:00                           [facility]

al [place]

 

             Heart Rate   2016 00:40:00                           [facility]

 [place]

 

             Heart Rate   2016 00:30:00                           [facility]

 [place]

 

             Systolic (mm Hg) 2016 00:30:00                           [facility]

[facility] [place]

 

             Temperature Oral (F) 2016-03-10 23:50:00 37.1 [place]                  

[facility] [place]

 

             Height       2016-03-10 19:23:00 169 cm                    [facility]

 [place]

 

             Weight       2016-03-10 19:23:00                           [facility]

 [place]

 

             Temperature Oral (F) 2016-03-10 19:23:00 36.6 Sherlyn                  

[facility] [place]

 

             Height       2016 14:13:00 170 cm                    [facility]

 [place]

 

             Weight       2016 14:13:00                           [facility]

 [place]







Procedures







                Procedure       Date / Time Performed Performing Clinician [facility]

e

 

                Diabetic retinal eye 2020 06:00:00                 Dillan Mart



                exam<sup>1</sup>                                 

 

                Mammogram       2017-07-15 05:00:00                 Doreen hernandes

 

                Colonoscopy<sup>2</sup> 2017 06:00:00                 Hakeem

rial [place]

 

                OPEN REDUCTION INTERNAL 2016-03-10 22:13:00 Yoan Lei [facility]

rial [place]



                FIXATION RADIUS                                 



                INTRA-ARTICULAR W/3                                 



                FRAGMENTS 77904                                 



                (Right)<sup>1</sup>                                 

 

                Repair of       2016-03-10 06:00:00                 Doreen hernandes



                wrist<sup>1</sup>                                 

 

                skin cancer removed from 2012 00:00:00                 [facility]

[facility] [place]



                arm                                             

 

                Skin cancer of                                  [facility]



                arm<sup>2</sup>                                 

 

                Procedure<sup>2</sup>                                 [facility]

ermann

 

                Tubal ligation                                  [facility]

 

                Eye operation                                   [facility]

 

                Biopsy of breast                                 [facility]

n

 

                bilateral radial                                 [facility]

n



                kerototomy                                      

 

                bilateral tubal ligation                                 [facility]

l [place]

 

                colonoscopy                                     [facility]







Encounters







        Start   End     Encounter Admission Attending Care    Care    Encounter 

Source



        Date/Time Date/Time Type    Type    Clinicians Facility Department ID   

   

 

        2021 Outpatient         [facility],  [facility]     [id number]

742 [place]



        00:00:00 00:00:00                 RAZIUDDIN                 834     Meth

farhana



                                                                        

 

        2021 Between                 [facility] 3467

[id number] [facility]



        14:18:24 14:18:24 Visit                   r       Medicine 62      l



                                                        Rudy Hines

n

 

        2021 Between                 [facility] 3467

[id number] [facility]



        00:56:47 00:56:47 Visit                   r       Medicine 61      l



                                                        Rudy Hines

n

 

        2021 Outpatient                 [facility] 3

[id number] [facility]



        20:00:00 05:59:59                         r       Medicine 52      l



                                                        Grantshyanne Hines

n

 

        2021 Phone                   [facility] 3467

[id number] [facility]



        21:38:38 05:59:59 Message                 r       Medicine 13      l



                                                        Rudy Hines

n

 

        2021 Outpatient         ANABEL [facility]     [id number]

744 [place]



        00:00:00 00:00:00                 DAYLIN                  513     Method

i



                                                                        

 

        2021-11-15 2021 Between                 [facility] 3467

[id number] [facility]



        15:33:59 15:33:59 Visit                   r       Medicine 59      l



                                                        Rudy Hines

n

 

        2021 2021-10-05 Inpatient         SOLIPURAM, [facility]     074     [id number]

[id number] [place]



        00:00:00 00:00:00                 MELISSA                    329     Method

i



                                                                        

 

        2021 Outpatient         OPNANI, [facility]     2100

[id number] [place]



        00:00:00 00:00:00                 JULIANNA                 104     Method

i



                                                                        

 

        2021 Outpatient         [facility]     [id number]

765 [place]



        00:00:00 00:00:00                 RAZIUDDIN                 273     Meth

farhana



                                                                        

 

        2021 Outpatient         EKERUO, [facility]     [id number]

411 [place]



        00:00:00 00:00:00                 DAYLIN                  787     Method

i



                                                                        

 

        2021 Outpatient         DAOURA, [facility]     [id number]

587 [place]



        00:00:00 00:00:00                 MAGY                 546     Method

i



                                                                        

 

        2021 Inpatient         MATHIVANAN, [facility]     064     [id number]

[id number] [place]



        00:00:00 00:00:00                 MELVIN                   275     Method

i



                                                                        

 

        2021 Outpatient         AHMED,  [facility]     [id number]

068 [place]



        00:00:00 00:00:00                 RAZIUDDIN                 199     Meth

farhana



                                                                        

 

        2021 Outpatient         EKERUO, [facility]     021     [id number]

337 [place]



        00:00:00 00:00:00                 DAYLIN                  640     Method

i



                                                                        

 

        2021 Outpatient         EKERUO, [facility]     [id number]

412 [place]



        00:00:00 00:00:00                 DAYLIN                  435     Method

i



                                                                        

 

        2021 Outpatient         EKERUO, [facility]     [id number]

412 [place]



        00:00:00 00:00:00                 DAYLIN                  537     Method

i



                                                                        

 

        2021 Outpatient         EKERUO, [facility]     [id number]

029 [place]



        00:00:00 00:00:00                 DAYLIN                  709     Method

i



                                                                        

 

        2021 Inpatient         SOLIPURAM, [facility]     064     [id number]

[id number] [place]



        00:00:00 00:00:00                 MELISSA                    685     Method

i



                                                                        

 

        2021 Outpatient         AHMED,  [facility]     [id number]

046 [place]



        00:00:00 00:00:00                 RAZIUDDIN                 101     Meth

farhana



                                                                        

 

        2021 Outpatient                 [facility] 3467

[id number] [facility]



        01:00:00 04:59:00                         r       [place] 58      l



                                                        [place]         [place]



 

        2021 Outpatient         AHMED,  [facility]    PUL     [id number]   

 [facility]



        20:00:00 23:59:00                 RAZIUDDIN                         

 

        2021 Outpatient         DANIELLA,  [facility]     [id number]

900 [place]



        00:00:00 00:00:00                 RAZIUDDIN                 598     Meth

farhana



                                                                        

 

        2021-04-15 2021 Inpatient         ANAIS, [facility]     064     [id number]

[id number] [place]



        00:00:00 00:00:00                 MELVIN                   060     Method

i



                                                                        

 

        2021 Outpatient                 [facility]     [id number]

422 [place]



        00:00:00 00:00:00                                         470     Method

i



                                                                        st

 

        2021 Outpatient                 [facility] 3

[id number] [facility]



        19:30:00 04:59:59                         r       Medicine 51      l



                                                        Rudy Hines

n

 

        2021 Outpatient                 [facility]     [id number]

901 [place]



        00:00:00 00:00:00                                         398     Method

i



                                                                        

 

        2021 Between                 [facility] 3467

[id number] [facility]



        13:38:03 13:38:03 Visit                   r       Medicine 57      l



                                                        Rudy Hines

n

 

        2021 Outpatient         DANIELLA,  [facility]     [id number]

980 [place]



        00:00:00 00:00:00                 RAZIUDDIN                 554     Meth

farhana



                                                                        

 

        2021 [facility]     [facility]     2100

[id number] [place]



        00:00:00 00:00:00                 MELVIN                   541     Method

i



                                                                        

 

        2021 Inpatient         ANAIS, [facility]     Ayse     2100

[id number] [place]



        00:00:00 00:00:00                 MELVIN                   559     Method

i



                                                                        

 

        2020 Inpatient         ANAIS, [facility]     012     

[id number] [place]



        00:00:00 00:00:00                 MELVIN                   271     Method

i



                                                                        

 

        2020 Outpatient                 [facility] 3

[id number] [facility]



        16:30:00 05:59:59                         r       Medicine 50      l



                                                        Rudy martinez

 

        2020 Inpatient         SOLIPURAM, [facility]     012     [id number]

[id number] [place]



        00:00:00 00:00:00                 MELISSA                    464     Method

i



                                                                        

 

        2020-10-23 2020 Inpatient         TONO [facility]     012     [id number]

[id number] [place]



        00:00:00 00:00:00                 MELISSA                    557     Method

i



                                                                        

 

        2020-10-23 2020-10-24 Outpt Diag                 [facility]    [id number]

[id number] [facility]



        18:10:00 04:59:00 Services                 r       Outpatient 06      l



                                                        Imaging         [place]



                                                        [place]         

 

        2020-10-21 2020-10-22 Between                 [facility] 3467

[id number] [facility]



        17:58:19 17:58:19 Visit                   r       Medicine 56      l



                                                        Rudy Hines

n

 

        2020-10-13 2020-10-14 Outpatient                 [facility] 3

[id number] [facility]



        15:15:00 04:59:59                         r       Medicine 49      l



                                                        Rudy Hines

n

 

        2020 Outpt Diag                 [facility]    [id number]

[id number] [facility]



        17:02:00 04:59:00 Services                 r       Outpatient 05      l



                                                        Imaging         [place]



                                                        [place]         

 

        2020 Ambulatory                 [facility]    [id number]

[id number] [facility]



        19:00:00 19:00:00 Pre-Reg                 r       Nephrology 46      l



                                                        Rudy Hines

n

 

        2020 Outpatient                 [facility]    [id number]

365 [facility]



        11:15:00 11:15:00                                         47      l



                                                                        [place]

 

        2020 Outpatient                 [facility]    [id number]

[id number] [facility]



        19:45:00 04:59:59                         r       Nephrology 48      l



                                                        Rudy Hines

n

 

        2020 Outpatient         MANGIN, [facility]     [id number]

467 [place]



        00:00:00 00:00:00                 EMILIA                    832     Method

i



                                                                        

 

        2020 Phone                   [facility]    [id number]

55 [facility]



        16:17:50 04:59:59 Message                 r       Nephrology 12      l



                                                        Rudy Hines

n

 

        2020 Outpatient                 [facility]    [id number]

[id number] [facility]



        19:00:00 04:59:59                         r       Nephrology 41      l



                                                        Rudy Hines

n

 

        2020 Phone                   [facility]    [id number]

55 [facility]



        18:35:51 04:59:59 Message                 r       Nephrology 11      dennis Arguello         [place]

 

        2020 Outpatient                 [facility] 3

[id number] [facility]



        15:15:00 04:59:59                         r       Medicine 45      dennis Hines

michelle

 

        2020 Ambulatory                 [facility] 3

[id number] [facility]



        15:15:00 15:15:00 Pre-Reg                 r       Medicine 44      l



                                                        Rudy Hines

michelle

 

        2020 Phone                   [facility]    [id number]

55 [facility]



        13:23:32 04:59:59 Message                 r       Nephrology 10      dennis Hines

michelle

 

        2020 Between                 [facility] 3467

[id number] [facility]



        14:14:54 14:14:54 Visit                   r       Medicine 52      dennis Rosariosylvie martinez

 

        2020 Outpatient                 [facility]    IE    [id number]

365 [facility]



        11:30:00 11:30:00                                         43      dennis



                                                                        [place]

 

        2020 2020-04-15 Phone                   [facility] 3467

[id number] [facility]



        20:00:15 04:59:59 Message                 r       Medicine 09      dennis Hines

michelle

 

        2020-04-10 2020 Phone                   [facility] 3467

[id number] [facility]



        17:18:28 04:59:59 Message                 r       Medicine 08      dennis Hines

michelle

 

        2020-04-10 2020 Phone                   [facility]    [id number]

55 [facility]



        13:26:55 04:59:59 Message                 r       Nephrology 07      dennis Hines

michelle

 

        2020 Outpatient                 [facility] 3

[id number] [facility]



        20:15:00 05:59:59                         r       Medicine 42      dennis Rosariosylvie martinez

 

        2020 Phone                   [facility] 3467

[id number] [facility]



        14:22:27 05:59:59 Message                 r       Medicine 06      l



                                                        Rudy Rosariosylvie martinez

 

        2019 Phone                   [facility] 3467

[id number] [facility]



        16:06:04 05:59:59 Message                 r       Medicine 05      dennis martinez

 

        2019 Between                 [facility]    [id number]

75 [facility]



        20:05:03 20:05:03 Visit                   r       Nephrology 45      dennis Mart

 

        2019 Outpatient                 [facility]    [id number]

[id number] [facility]



        20:45:00 05:59:59                         r       Nephrology 38      dennis martinez

 

        2019 Between                 [facility]    [id number]

75 [facility]



        00:07:10 00:07:10 Visit                   r       Nephrology 43      dennis Mart

 

        2019 Outpatient                 [facility]    [facility]    [id number]

365 [facility]



        09:00:00 09:00:00                                         39      dennis Mart

 

        2019 Between                 [facility] 3467

[id number] [facility]



        21:47:12 21:47:12 Visit                   r       Medicine 41      dennis Hines

michelle

 

        2019-10-29 2019-10-30 Between                 [facility] 3467

[id number] [facility]



        22:13:13 22:13:13 Visit                   r       Medicine 40      dennis martinez

 

        2019-10-25 2019-10-26 Outpatient                 [facility] 3

[id number] [facility]



        14:45:00 04:59:59                         r       Medicine 40      dennis martinez

 

        2019-10-17 2019-10-17 Ambulatory                 [facility] 3

[id number] [facility]



        16:00:00 16:00:00 Pre-Reg                 r       Medicine 36      dennis Hines

michelle

 

        2019-10-10 2019-10-11 Outpatient                 [facility]    [id number]

[id number] [facility]



        15:00:00 04:59:59                         r       Nephrology 35      dennis martinez

 

        2019-10-10 2019-10-10 Outpatient                 [facility]    IE    [id number]

365 [facility]



        12:00:00 12:00:00                                         37      dennis Mart

 

        2019 Phone                   [facility] 3467

[id number] [facility]



        15:15:06 04:59:59 Message                 r       Medicine 04      dennsi Hines

michelle

 

        2019 Phone                   [facility]    [id number]

55 [facility]



        15:10:51 04:59:59 Message                 r       Nephrology 03      dennis martinez

 

        2019 Ambulatory                 [facility]    [id number]

[id number] [facility]



        20:00:00 20:00:00 Pre-Reg                 r       Nephrology 34      dennis martinez

 

        2019 Between                 [facility]    [id number]

75 [facility]



        20:15:16 20:15:16 Visit                   r       Nephrology 34      dennis Rosarioann

 

        2019 Phone                   [facility]    [id number]

55 [facility]



        15:29:54 04:59:59 Message                 r       Nephrology 02      dennis Mart

 

        2019 Ambulatory                 [facility]    [id number]

[id number] [facility]



        16:30:00 16:30:00 Pre-Reg                 r       Nephrology 29      dennis martinez

 

        2019 Ambulatory                 [facility]    [id number]

[id number] [facility]



        16:15:00 16:15:00 Pre-Reg                 r       Nephrology 30      dennis martinez

 

        2019 Ambulatory                 [facility] 3

[id number] [facility]



        15:15:00 15:15:00 Pre-Reg                 r       Medicine 31      dennis martinez

 

        [facility]    [id number]

69 [place]



        10:05:00 17:55:00                 GOPAL                         [facility]

[facility]

 

        [facility]    [id number]

427 [place]



        21:36:00 19:00:00                 GOPAL                         [facility]

[facility]

 

        2019 Outpatient                 [facility] [id number]

[id number] [facility]



        20:40:00 04:59:59                         r       Care    33      dennis Rosarioann

 

        2019 Outpatient                 [facility] 3

[id number] [facility]



        15:15:00 04:59:59                         r       Medicine 32      dennis martinez

 

        2019 Between                 [facility] 3467

[id number] [facility]



        19:00:16 19:00:16 Visit                   r       Medicine 29      dennis Hines

michelle

 

        2019 2019-03-15 Between                 [facility]    [id number]

75 [facility]



        15:02:37 15:02:37 Visit                   r       Nephrology 27      l



                                                        Rudy Hines

n

 

        2019 2019-03-15 Outpatient                 [facility]    [id number]

[id number] [facility]



        18:45:00 04:59:59                         r       Nephrology 28      l



                                                        Rudy martinez

 

        2019 2019-03-15 Outpatient                 [facility] 3

[id number] [facility]



        14:15:00 04:59:59                         r       Medicine 22      l



                                                        Rudy Hines

michelle

 

        2019 Between                 [facility]    [id number]

75 [facility]



        23:59:47 23:59:47 Visit                   r       Nephrology 26      l



                                                        Rudy Hines

michelle

 

        2019 Between                 [facility]    [id number]

75 [facility]



        22:00:33 22:00:33 Visit                   r       Nephrology 24      dennis Hines

michelle

 

        2019 Between                 [facility]    [id number]

75 [facility]



        04:48:44 04:48:44 Visit                   r       Nephrology 23      l



                                                        Rudy Hines

michelle

 

        2019 Ambulatory                 [facility]    [id number]

[id number] [facility]



        20:30:00 20:30:00 Pre-Reg                 r       Nephrology 27      dennis Hines

michelle

 

        2019 Ambulatory                 [facility]    [id number]

[id number] [facility]



        18:40:00 18:40:00 Pre-Reg                 r       Nephrology 24      dennis Hines

michelle

 

        2019 Ambulatory                 [facility]    [id number]

[id number] [facility]



        15:30:00 15:30:00 Pre-Reg                 r       Internal 25      dennis Grant         

 

        2018 2018-10-20 Ambulatory                 [facility]    [id number]

[id number] [facility]



        12:45:00 12:45:00 Pre-Reg                 r       Internal 23      dennis Grant         

 

        2018 Outpatient                 nullFlavo [facility]    [id number]

[id number] [facility]



        19:15:00 04:59:59                         r       Nephrology 26      l



                                                        Rudy Hines

michelle

 

        2018 Outpatient                 nullFlavo [facility]    [id number]

[id number] [facility]



        18:00:00 04:59:59                         r       Nephrology 21      l



                                                        Rudy         Donny martinez

 

        2018 Outpatient                 nullFlavo [facility]

[id number] [facility]



        18:30:00 04:59:59                         r       Medicine 20      l



                                                        Rudy Hines

n

 

        2018 Outpatient                 nullFlavo [facility]    [id number]

[id number] [facility]



        16:00:00 04:59:59                         r       Nephrology 19      dennis Hines

n

 

        2018 Outpatient                 nullFlavo [facility]

[id number] [facility]



        15:00:00 04:59:59                         r       Medicine 18      l



                                                        Rudy Hines

n

 

        2017 Outpatient                 [facility]    [id number]

365 [facility]



        10:40:00 10:40:00                                         16      dennis Mart

 

        2017 Outpatient                 [facility]    [id number]

365 [facility]



        11:30:00 11:30:00                                         17      dennis Mart

 

        2017 Outpatient                 [facility]    [id number]

365 [facility]



        11:40:00 11:40:00                                         11      dennis Mart

 

        2017 Outpatient                 [facility]    [id number]

365 [facility]



        14:30:00 14:30:00                                         15      dennis Mart

 

        2017 Outpatient                 [facility]    [id number]

365 [facility]



        10:00:00 10:00:00                                         08      dennis Mart

 

        2017 Bedded                  nullFlavo [facility] [id number]

375 [facility]



        11:48:35 14:30:00 Outpatient                 xuan Mart 13      l



                                                        Jyoti Harper



 

        2017 Outpatient                 [facility]    [id number]

365 [facility]



        13:15:00 13:15:00                                         14      dennis Mart

 

        2016 Outpatient                 [facility]    [id number]

365 [facility]



        09:30:00 09:30:00                                         12      dennis Mart

 

        2016 Outpatient                 [facility]    [id number]

365 [facility]



        09:30:00 09:30:00                                         13      dennis Mart

 

        2016 Outpatient                 [facility]    [id number]

365 [facility]



        10:20:00 10:20:00                                         09      dennis Mart

 

        2016 Outpatient                 [facility]    [id number]

365 [facility]



        13:00:00 13:00:00                                         04      l



                                                                        [place]

 

        2016 Outpatient                 [facility]    [id number]

365 [facility]



        11:15:00 11:15:00                                         06      dennis



                                                                        [place]

 

        2016 OP Therapy                 nullFlavo [facility]     [id number]

[id number] [facility]



        13:00:00 04:59:00 Patients                 r       [place] 03      l



                                                        Jl Mart

 

        2016 OP Therapy                 nullFlavo [facility]     [id number]

[id number] [facility]



        17:39:00 04:59:00 Patients                 r       Regulo 02      l



                                                        Trace           [place]

 

        2016 OP Therapy                 nullFlavo [facility] [id number]

[id number] [facility]



        19:00:00 04:59:00 Patients                 r       [place]   01      dennis



                                                                        [place]

 

        2016 Outpt Diag                 nullFlavo [facility]    [id number]

[id number] [facility]



        16:14:00 04:59:00 Services                 r       Outpatient 04      l



                                                        Imaging         [place]



                                                        [place]         

 

        2016 OP Therapy                 nullFlavo [facility] [id number]

[id number] [facility]



        19:00:00 04:59:00 Patients                 r       [place]   00      dennis



                                                                        [place]

 

        [facility]    [id number]

365 [facility]



        10:20:00 10:20:00                                         01      dennis



                                                                        [place]

 

        2016-05-10 2016 Outpt Diag                 nullFlavo [facility]    [id number]

[id number] [facility]



        14:59:00 04:59:00 Services                 r       Outpatient 03      l



                                                        Imaging         [place]



                                                        [place]            

 

        2016 Outpt Diag                 nullFlavo [facility]    [id number]

[id number] [facility]



        18:11:00 04:59:00 Services                 r       Outpatient 01      l



                                                        Imaging         [place]



                                                        [place]         

 

        2016-03-10 2016-03-10 Outpatient                 nullFlavo [facility]    [id number]

   [facility]



        12:50:31 19:25:00                         r                       dennis



                                                                        [place]

 

        2016 2016-02-10 Outpt Diag                 nullFlavo [facility]    [id number]

[id number] [facility]



        12:58:00 05:59:00 Services                 r       Outpatient 00      l



                                                        Imaging         [place]



                                                        [place]         

 

        2016 Outpatient                 [facility]    [id number]

365 [facility]



        10:15:00 10:15:00                                         02      l



                                                                        [place]

 

        2015 Outpatient                 [facility]    [id number]

365 [facility]



        11:30:00 11:30:00                                         00      l



                                                                        [place]

 

        2014 Outpatient                 [place] [facility] 3467

2273_3 Memoria



        01:16:00 05:59:00                         r       [place] [id number] l



                                                        [place] 1       [place]

nn

 

        2014 [facility]      [id number]

[id number] [facility]



        19:16:00 19:16:00                         r       [place] 01      l



                                                                        [place]

 

        2014 [facility]      [id number]

[id number] [facility]



        19:43:00 19:43:00                         r       [place] 00      l



                                                                        [place]







Results







           Test Description Test Time  Test Comments Results    Result Comments 

Source









                          SARS-CoV-2 (COVID-19) RNA [Presence] in Respiratory sp

ecimen by 2021 

22:34:30                                



                    EDWARD with probe detection                     









                      Test Item  Value      Reference Range Interpretation Comme

nts









             SARS-CoV-2 (COVID-19) RNA [Presence] in Respiratory Not detected No

t-Detected              



             specimen by EDWARD with probe detection (test code =                  

                      



             68768-8)                                            

 

             Whether patient is employed in a healthcare setting                

                        



             (test code = 12312-2)                                        

 

             Whether the patient has symptoms related to condition              

                          



             of interest (test code = 74273-0)                                  

      

 

             Patient was hospitalized because of this condition                 

                       



             (test code = 66766-2)                                        

 

             Whether the patient was admitted to intensive care unit            

                            



             (ICU) for condition of interest (test code = 32459-1)              

                          

 

             Whether patient resides in a congregate care setting               

                         



             (test code = 79458-5)                                        



SARS-CoV-2 (COVID-19) RNA [Presence] in Respiratory specimen by EDWARD with probe 
[id number] 22:35:42





             Test Item    Value        Reference Range Interpretation Comments

 

             SARS-CoV-2 (COVID-19) RNA Not detected Not-Detected              



             [Presence] in Respiratory                                        



             specimen by EDWARD with probe                                        



             detection (test code = 96591-3)                                    

    

 

             Whether patient is employed in a                                   

     



             healthcare setting (test code =                                    

    



             82328-4)                                            

 

             Whether the patient has symptoms                                   

     



             related to condition of interest                                   

     



             (test code = 00328-9)                                        

 

             Patient was hospitalized because                                   

     



             of this condition (test code =                                     

   



             57700-8)                                            

 

             Whether the patient was admitted                                   

     



             to intensive care unit (ICU) for                                   

     



             condition of interest (test code                                   

     



             = 03321-8)                                          

 

             Whether patient resides in a                                       

 



             congregate care setting (test                                      

  



             code = 33634-4)                                        



SARS-CoV-2 (COVID-19) RNA [Presence] in Respiratory specimen by EDWARD with probe 
[id number] 22:46:10





             Test Item    Value        Reference Range Interpretation Comments

 

             SARS-CoV-2 (COVID-19) RNA Not detected Not-Detected              



             [Presence] in Respiratory                                        



             specimen by EDWARD with probe                                        



             detection (test code = 77038-3)                                    

    

 

             Whether patient is employed in a                                   

     



             healthcare setting (test code =                                    

    



             16518-1)                                            

 

             Whether the patient has symptoms                                   

     



             related to condition of interest                                   

     



             (test code = 03816-6)                                        

 

             Patient was hospitalized because                                   

     



             of this condition (test code =                                     

   



             84626-6)                                            

 

             Whether the patient was admitted                                   

     



             to intensive care unit (ICU) for                                   

     



             condition of interest (test code                                   

     



             = 56253-4)                                          

 

             Whether patient resides in a                                       

 



             congregate care setting (test                                      

  



             code = 43157-2)                                        



SARS-CoV-2 (COVID-19) RNA [Presence] in Respiratory specimen by EDWARD with probe 
[id number] 01:54:24





             Test Item    Value        Reference Range Interpretation Comments

 

             SARS-CoV-2 (COVID-19) RNA Not detected Not-Detected              



             [Presence] in Respiratory                                        



             specimen by EDWARD with probe                                        



             detection (test code = 41578-0)                                    

    

 

             Whether patient is employed in a                                   

     



             healthcare setting (test code =                                    

    



             49314-5)                                            

 

             Whether the patient has symptoms                                   

     



             related to condition of interest                                   

     



             (test code = 94999-1)                                        

 

             Patient was hospitalized because                                   

     



             of this condition (test code =                                     

   



             29665-1)                                            

 

             Whether the patient was admitted                                   

     



             to intensive care unit (ICU) for                                   

     



             condition of interest (test code                                   

     



             = 03193-7)                                          

 

             Whether patient resides in a                                       

 



             congregate care setting (test                                      

  



             code = 80158-9)                                        



SARS-CoV-2 (COVID-19) RNA [Presence] in Respiratory specimen by EDWARD with probe 
[id number] 21:44:06





             Test Item    Value        Reference Range Interpretation Comments

 

             SARS-CoV-2 (COVID-19) RNA Not detected Not-Detected              



             [Presence] in Respiratory                                        



             specimen by EDWARD with probe                                        



             detection (test code = 17161-8)                                    

    

 

             Whether patient is employed in a                                   

     



             healthcare setting (test code =                                    

    



             05499-5)                                            

 

             Whether the patient has symptoms                                   

     



             related to condition of interest                                   

     



             (test code = 18967-0)                                        

 

             Patient was hospitalized because                                   

     



             of this condition (test code =                                     

   



             96992-6)                                            

 

             Whether the patient was admitted                                   

     



             to intensive care unit (ICU) for                                   

     



             condition of interest (test code                                   

     



             = 16784-6)                                          

 

             Whether patient resides in a                                       

 



             congregate care setting (test                                      

  



             code = 08729-0)                                        



SARS-CoV-2 (COVID-19) RNA [Presence] in Respiratory specimen by EDWARD with probe 
[id number] 00:36:59





             Test Item    Value        Reference Range Interpretation Comments

 

             SARS-CoV-2 (COVID-19) RNA Not detected Not-Detected              



             [Presence] in Respiratory                                        



             specimen by EDWARD with probe                                        



             detection (test code = 13188-9)                                    

    



SARS-CoV-2 (COVID-19) RNA [Presence] in Respiratory specimen by EDWARD with probe 
[id number] 17:35:35





             Test Item    Value        Reference Range Interpretation Comments

 

             SARS-CoV-2 (COVID-19) RNA Not detected Not-Detected              



             [Presence] in Respiratory                                        



             specimen by EDWARD with probe                                        



             detection (test code = 63296-5)                                    

    



SARS-CoV-2 (COVID-19) RNA [Presence] in Respiratory specimen by EDWARD with probe 
[id number] 18:03:30





             Test Item    Value        Reference Range Interpretation Comments

 

             SARS-CoV-2 (COVID-19) RNA Not detected Not-Detected              



             [Presence] in Respiratory                                        



             specimen by EDWARD with probe                                        



             detection (test code = 89529-2)                                    

    



SARS-CoV-2 (COVID-19) RNA [Presence] in Respiratory specimen by EDWARD with probe 
[id number] 10:06:03





             Test Item    Value        Reference Range Interpretation Comments

 

             SARS-CoV-2 (COVID-19) RNA Not detected Not-Detected              



             [Presence] in Respiratory                                        



             specimen by EDWARD with probe                                        



             detection (test code = 18579-0)                                    

    



SARS-CoV-2 (COVID-19) RNA [Presence] in Respiratory specimen by EDWARD with probe 
[id number] 05:11:58





             Test Item    Value        Reference Range Interpretation Comments

 

             SARS-CoV-2 (COVID-19) RNA Not detected Not-Detected              



             [Presence] in Respiratory                                        



             specimen by EDWARD with probe                                        



             detection (test code = 14352-6)                                    

    



SARS-CoV-2 (COVID-19) RNA [Presence] in Respiratory specimen by EDWARD with probe 
[id number] 03:34:16





             Test Item    Value        Reference Range Interpretation Comments

 

             SARS-CoV-2 (COVID-19) RNA Not detected Not-Detected              



             [Presence] in Respiratory                                        



             specimen by EDWARD with probe                                        



             detection (test code = 01790-8)                                    

    



SARS-CoV-2 (COVID-19) RNA [Presence] in Respiratory specimen by EDWARD with probe 
[id number] 10:06:41





             Test Item    Value        Reference Range Interpretation Comments

 

             SARS-CoV-2 (COVID-19) RNA Not detected Not-Detected              



             [Presence] in Respiratory                                        



             specimen by EDWARD with probe                                        



             detection (test code = 05231-6)                                    

    



LIPIDS2020-10-14 12:33:00





             Test Item    Value        Reference Range Interpretation Comments

 

             Chol (test code = Chol) 129                                    



[facility]





             Test Item    Value        Reference Range Interpretation Comments

 

             HDL (test code = HDL) 37                                     



[facility]





             Test Item    Value        Reference Range Interpretation Comments

 

             Trig (test code = Trig) 76                                     



[facility]





             Test Item    Value        Reference Range Interpretation Comments

 

             LDL (Calculated) (test code = LDL 76                               

      



             (Calculated))                                        



[facility]





             Test Item    Value        Reference Range Interpretation Comments

 

             CHD Risk (test code = CHD Risk) 3.5                                

    



[facility]





             Test Item    Value        Reference Range Interpretation Comments

 

             Non HDL Chol (test code = Non HDL Chol) 92                         

            



[facility] [id number] 14:47:00





             Test Item    Value        Reference Range Interpretation Comments

 

             Vitamin D, 25-OH, Total (test code = 37                      

         



             Vitamin D, 25-OH, Total)                                        



[facility] [id number] 14:47:00





             Test Item    Value        Reference Range Interpretation Comments

 

             U Creat mg/dL (test code = U Creat 114                       

       



             mg/dL)                                              



[facility] [id number] 14:47:00





             Test Item    Value        Reference Range Interpretation Comments

 

             U Prot/Creat (test code = U Prot/Creat) 430                  

            



[facility] [id number] 14:47:00





             Test Item    Value        Reference Range Interpretation Comments

 

             U Prot/Creat (test code = U 0.430 1      0.021-0.161               



             Prot/Creat)                                         



[place] [id number] 14:47:00





             Test Item    Value        Reference Range Interpretation Comments

 

             U Protein (test code = U Protein) 49           5-24                

      



[place] 453450-08-06 14:47:00





             Test Item    Value        Reference Range Interpretation Comments

 

             Glucose Lvl (test code = Glucose Lvl) 103          65-99           

          



[place] 453450-08-06 14:47:00





             Test Item    Value        Reference Range Interpretation Comments

 

             BUN (test code = BUN) 24           7-25                      



[place] [id number] 14:47:00





             Test Item    Value        Reference Range Interpretation Comments

 

             Creatinine Lvl (test code = Creatinine 1.32         0.50-0.99      

           



             Lvl)                                                



[place] 453450-08-06 14:47:00





             Test Item    Value        Reference Range Interpretation Comments

 

             eGFR NON-AFR. AMERICAN (test code = 43                             

        



             eGFR NON-AFR. AMERICAN)                                        



[facility]2020-08-06 14:47:00





             Test Item    Value        Reference Range Interpretation Comments

 

             eGFR  (test code = eGFR 50                         

            



             )                                        



[place] 453450-08-06 14:47:00





             Test Item    Value        Reference Range Interpretation Comments

 

             B/C Ratio (test code = B/C Ratio) 18           6-22                

      



[place] 453450-08-06 14:47:00





             Test Item    Value        Reference Range Interpretation Comments

 

             Sodium Lvl (test code = Sodium Lvl) 138          135-146           

        



[place] 453450-08-06 14:47:00





             Test Item    Value        Reference Range Interpretation Comments

 

             Potassium Lvl (test code = Potassium 4.4          3.5-5.3          

         



             Lvl)                                                



[place] 453450-08-06 14:47:00





             Test Item    Value        Reference Range Interpretation Comments

 

             Chloride Lvl (test code = Chloride Lvl) 102                  

            



[place] 453450-08-06 14:47:00





             Test Item    Value        Reference Range Interpretation Comments

 

             CO2 (test code = CO2) 30           20-32                     



[place] 453450-08-06 14:47:00





             Test Item    Value        Reference Range Interpretation Comments

 

             Calcium Lvl (test code = Calcium Lvl) 9.4          8.6-10.4        

          



[place] 453450-08-06 14:47:00





             Test Item    Value        Reference Range Interpretation Comments

 

             Phosphorus (test code = Phosphorus) 4.8          2.5-4.5           

        



[facility]2020-08-06 14:47:00





             Test Item    Value        Reference Range Interpretation Comments

 

             Albumin Lvl (test code = Albumin Lvl) 3.9          3.6-5.1         

          



[facility]





             Test Item    Value        Reference Range Interpretation Comments

 

             Plt Count Estimated (test code = DECREASED                         

     



             Plt Count Estimated)                                        



[facility]AtxwtkpLTYCIFBISH2019-48-56 14:47:00





             Test Item    Value        Reference Range Interpretation Comments

 

             WBC X 10x3 (test code = WBC X 10x3) 7.2          3.8-10.8          

        



[facility] HospitalNzhqyedFXJACBARMJ1501-63-22 14:47:00





             Test Item    Value        Reference Range Interpretation Comments

 

             RBC X 10x6 (test code = RBC X 10x6) 3.71         3.80-5.10         

        



[facility] HospitalQerliolWRYOPYFGSM7658-35-39 14:47:00





             Test Item    Value        Reference Range Interpretation Comments

 

             Hgb (test code = Hgb) 10.7         11.7-15.5                 



[facility] HospitalQcbjhecUDPRKMHIUJ6714-85-03 14:47:00





             Test Item    Value        Reference Range Interpretation Comments

 

             Hct (test code = Hct) 33.9         35.0-45.0                 



[facility] HospitalFcpojoeBCNGMPIDCV6992-25-74 14:47:00





             Test Item    Value        Reference Range Interpretation Comments

 

             MCV (test code = MCV) 91.4         80.0-100.0                



[facility] HospitalMncvttqGJAEIDEBWL5987-62-82 14:47:00





             Test Item    Value        Reference Range Interpretation Comments

 

             MCH (test code = MCH) 28.8 pg      27.0-33.0                 



[facility] HospitalGztqxjlVFUDMSXHEB5382-27-21 14:47:00





             Test Item    Value        Reference Range Interpretation Comments

 

             MCHC (test code = MCHC) 31.6         32.0-36.0                 



[place] 057450-08-06 14:47:00





             Test Item    Value        Reference Range Interpretation Comments

 

             RDW (test code = RDW) 13.9         11.0-15.0                 



[facility] HospitalJfrjivtFNCCVFVCCT5198-11-03 14:47:00





             Test Item    Value        Reference Range Interpretation Comments

 

             Platelet (test code = Platelet) 110          140-400               

    



[facility] HospitalUqxnttcKWFHAOYOFI2041-63-78 14:47:00





             Test Item    Value        Reference Range Interpretation Comments

 

             MPV (test code = MPV) 11.7         7.5-12.5                  



[facility]xyKQGDCTHHSF1132-15-65 14:47:00





             Test Item    Value        Reference Range Interpretation Comments

 

             Neutrophils # (test code = Neutrophils 5414         4230-1601      

           



             #)                                                  



[facility] HospitalKlvaqscWUGBOVHQPF7072-76-36 14:47:00





             Test Item    Value        Reference Range Interpretation Comments

 

             Lymphocytes # (test code = Lymphocytes 1152         850-3900       

           



             #)                                                  



[facility]tlfeqsNNEZYZMLMT5395-52-78 14:47:00





             Test Item    Value        Reference Range Interpretation Comments

 

             Monocytes # (test code = Monocytes #) 461          200-950         

          



[facility] SouthBavtgrsTHSLHTUTZW1314-82-28 14:47:00





             Test Item    Value        Reference Range Interpretation Comments

 

             Eosinophils # (test code = Eosinophils 122                   

           



             #)                                                  



[facility] HospitalDxyvwniBPTDGPXMEF1979-91-88 14:47:00





             Test Item    Value        Reference Range Interpretation Comments

 

             Basophils # (test code 50           See_Comment                [Aut

omated message] The



             = Basophils #)                                        system which 

generated



                                                                 this result tra

nsmitted



                                                                 reference range

: <=200.



                                                                 The reference r

cheyenne was



                                                                 not used to int

erpret



                                                                 this result as



                                                                 normal/abnormal

.



[facility] SouthEdapssrPMXBNZNJXD4088-50-87 14:47:00





             Test Item    Value        Reference Range Interpretation Comments

 

             Segs (test code = Segs) 75.2                                   



[facility] HospitalAbjivanLAWOYIKIHM3465-64-59 14:47:00





             Test Item    Value        Reference Range Interpretation Comments

 

             Lymphocytes (test code = Lymphocytes) 16.0                         

          



[facility] HospitalFundtwhHHDHZPVTKW5420-71-58 14:47:00





             Test Item    Value        Reference Range Interpretation Comments

 

             Monocytes (test code = Monocytes) 6.4                              

      



[facility] HospitalKcxnmudRWNPHSFFCM6232-67-87 14:47:00





             Test Item    Value        Reference Range Interpretation Comments

 

             Eosinophils (test code = Eosinophils) 1.7                          

          



[facility]FiqhthsOQOASGDKIA3568-42-10 14:47:00





             Test Item    Value        Reference Range Interpretation Comments

 

             Basophils (test code = Basophils) 0.7                              

      



[facility] SZBGLVU8867-76-76 14:47:00





             Test Item    Value        Reference Range Interpretation Comments

 

             Result 2 (Urine Culture) See Result Comment                        

   



             (test code = Result 2                                        



             (Urine Culture))                                        



[facility] [id number] 14:47:00





             Test Item    Value        Reference Range Interpretation Comments

 

             UA Color (test code = UA Color) YELLOW                             

    



[facility] [id number] 14:47:00





             Test Item    Value        Reference Range Interpretation Comments

 

             UA Turbidity (test code = UA CLOUDY                                

 



             Turbidity)                                          



[facility] [id number] 14:47:00





             Test Item    Value        Reference Range Interpretation Comments

 

             UA Spec Grav (test code = UA Spec 1.017 1      1.001-1.035         

      



             Grav)                                               



[facility] [id number] 14:47:00





             Test Item    Value        Reference Range Interpretation Comments

 

             UA pH (test code = UA pH) 5.5 1        5.0-8.0                   



[facility] [id number] 14:47:00





             Test Item    Value        Reference Range Interpretation Comments

 

             UA Glucose (test code = UA Glucose) NEGATIVE                       

        



[facility] [id number] 14:47:00





             Test Item    Value        Reference Range Interpretation Comments

 

             UA Bili (test code = UA Bili) NEGATIVE                             

  



[facility] [id number] 14:47:00





             Test Item    Value        Reference Range Interpretation Comments

 

             UA Ketones (test code = UA Ketones) NEGATIVE                       

        



[facility] [id number] 14:47:00





             Test Item    Value        Reference Range Interpretation Comments

 

             UA Blood (test code = UA Blood) 1+                                 

    



[facility] [id number] 14:47:00





             Test Item    Value        Reference Range Interpretation Comments

 

             UA Protein (test code = UA Protein) 1+                             

        



[facility] [id number] 14:47:00





             Test Item    Value        Reference Range Interpretation Comments

 

             UA Nitrite (test code = UA Nitrite) POSITIVE                       

        



[facility] [id number] 14:47:00





             Test Item    Value        Reference Range Interpretation Comments

 

             UA Leuk Est (test code = UA Leuk Est) 2+                           

          



[facility] [id number] 14:47:00





             Test Item    Value        Reference Range Interpretation Comments

 

             UA WBC (test code = UA WBC) > OR = 60                              



[facility] [id number] 14:47:00





             Test Item    Value        Reference Range Interpretation Comments

 

             UA RBC (test code = UA RBC) 0-2                                    



[facility] [id number] 14:47:00





             Test Item    Value        Reference Range Interpretation Comments

 

             UA Sq Epi (test code = UA Sq Epi) NONE SEEN                        

      



[facility] [id number] 14:47:00





             Test Item    Value        Reference Range Interpretation Comments

 

             UA Bacteria (test code = UA Bacteria) MANY                         

          



[facility] [id number] 14:47:00





             Test Item    Value        Reference Range Interpretation Comments

 

             UA Hyal Cast (test code = UA Hyal NONE SEEN                        

      



             Cast)                                               



[facility] AND [id number] 14:47:00





             Test Item    Value        Reference Range Interpretation Comments

 

             UA Reflex (test code CULTURE INDICATED -                           



             = UA Reflex) RESULTS TO FOLLOW                           



[facility] [id number] 14:47:00





             Test Item    Value        Reference Range Interpretation Comments

 

             U Creat mg/dL (test code = U Creat 114                       

       



             mg/dL)                                              



[facility] [id number] 14:47:00





             Test Item    Value        Reference Range Interpretation Comments

 

             U Alb (test code = U Alb) 16.7                                   



[facility] [id number] 14:47:00





             Test Item    Value        Reference Range Interpretation Comments

 

             U Alb/Crea (test code = U Alb/Crea) 146                            

        



[facility] PROTEIN ELECTROPHORESIS-24HR [id number] 08:01:00





             Test Item    Value        Reference Range Interpretation Comments

 

             CREATININE, 24 HOUR 1.45 g/24 h  0.50-2.15                 



             URINE (test code =                                        



             79390237)                                           

 

             PROTEIN/CREATININE 292 mg/g creat < OR = 114   H            



             RATION (test code =                                        



             47194582)                                           

 

             PROTEIN, TOTAL 24 HR  mg/24 h  <150         H            TEST

 PERFORMED



             (test code = 90519346)                                        AT:QU

EST



                                                                 [facility]

IN



                                                                 [address], [place]



                                                                 [id number] FELIX MD

 

             ALBUMIN (test code = 35 %                                   



             95096531)                                           

 

             ALPHA-1-GLOBULINS (test 10 %                                   



             code = 91554890)                                        

 

             ALPHA-2-GLOBULINS (test 12 %                                   



             code = 44799850)                                        

 

             BETA GLOBULINS (test 25 %                                   



             code = 68775272)                                        

 

             GAMMA GLOBULINS (test 18 %                                   



             code = 55525526)                                        

 

             INTERPRETATION (test                                        Albumin

 and



             code = 41248165)                                        various aba

bulin



                                                                 fractions



                                                                 detected on



                                                                 proteinelectrop

ho



                                                                 resis. No



                                                                 abnormal protei

n



                                                                 bands



                                                                 (Bence-Jonespro

te



                                                                 inuria)



                                                                 detected.TEST



                                                                 PERFORMED



                                                                 AT:[facility]

IN



                                                                 [address], [place]



                                                                 [address]KMWDFELVIRA FELIX MD



NEUTROPHIL CYTOPLASMIC [place]--10-21 05:19:00





             Test Item    Value        Reference Range Interpretation Comments

 

             ANCA SCREEN (test NEGATIVE     NEGATIVE                  ANCA Scree

n includes



             code = 26592547)                                        evaluation 

for p-ANCA,



                                                                 c-ANCA andatypi

tayla p-ANCA.



                                                                 A positive ANCA

 screen



                                                                 reflexes to tit

erand



                                                                 pattern(s), e.g

.,



                                                                 cytoplasmic pat

tern



                                                                 (c-ANCA),perinu

clear



                                                                 pattern (p-ANCA

), or



                                                                 atypical p-ANCA



                                                                 pattern.c-ANCA 

and p-ANCA



                                                                 are observed in

 vasculitis,



                                                                 whereasatypical

 p-ANCA is



                                                                 observed in IBD



                                                                 (Inflammatory



                                                                 BowelDisease). 

Atypical



                                                                 p-ANCA is detec

kolby in about



                                                                 55% to 80% ofpa

tients with



                                                                 ulcerative coli

tis but only



                                                                 5% to 25% ofpat

ients with



                                                                 Crohn's disease

.TEST



                                                                 PERFORMED AT:

[facility]



                                                                 [facility]



                                                                 [id number], [place]



                                                                 [id number] MARIEE MD,PHD

,RAMILA



COMPREHENSIVE METABOLIC [id number] 06:25:00





             Test Item    Value        Reference Range Interpretation Comments

 

             GLUCOSE (test code = 06D) 115 mg/dL           H            

 

             SODIUM (test code = 01A) 137 mmol/L   136-145                   

 

             POTASSIUM (test code = 01B) 3.3 mmol/L   3.6-5.1      L            

 

             CHLORIDE (test code = 04A) 97 mmol/L           L            

 

             CO2 (test code = 02A) 32 mmol/L    22-32                     

 

             ANION GAP (test code = ANG) 11.3 mmol/L                            

 

             BUN (test code = 05D) 36 mg/dL     7-18         H            

 

             CREATININE (test code = 03E) 1.4 mg/dL    0.4-1.1      H           

 

 

             BUN/CREA (test code = BCR) 25           12-20        H            

 

             CALCIUM (test code = 09D) 8.8 mg/dL    8.3-9.5                   

 

             BILI TOTAL (test code = 11A) 1.2 mg/dL    0.2-1.0      H           

 

 

             PROTEIN (test code = 07D) 6.5 g/dL     6.4-8.2                   

 

             ALBUMIN (test code = 08D) 2.9 g/dL     3.5-4.8      L            

 

             GLOBULIN (test code = GLB) 3.6 g/dL     1.5-3.8                   

 

             ALB/GLOB (test code = AGRR) 0.8          1.0-2.6      L            

 

             ALK PHOS (test code = 35A) 97 IU/L                          

 

             AST (test code = 30A) 15 IU/L      <=42                      

 

             ALT (test code = 31A) 35 IU/L      <=78                      



CBC (INCLUDES AUTOMATED DIFFERENTIAL)2019 06:06:00





             Test Item    Value        Reference Range Interpretation Comments

 

             WBC (test code = WBC) 6.9 10\S\3/uL 4.5-11.0                  

 

             RBC (test code = RBC) 3.56 10\S\6/uL 4.20-5.60    L            

 

             HGB (test code = HBG) 10.4 g/dL    12.0-15.5    L            

 

             HCT (test code = HCT) 32.1 %       35.0-44.0    L            

 

             MCV (test code = MCV) 90.2 fL      81.0-99.0                 

 

             MCH (test code = MCH) 29.2 pg      27.0-31.0                 

 

             MCHC (test code = MCHC) 32.4 g/dL    32.0-36.0                 

 

             RDW (test code = RDW) 15.0 %       11.5-14.5    H            

 

             PLT (test code = PLT) 158 10\S\3/uL 130-400                   

 

             MPV (test code = MPV) 10.7 fL      9.4-12.4                  

 

             NEUTROP # (test code = NE#) 4.4 10\S\3/uL 1.6-8.0                  

 

 

             LYMPH # (test code = LY#) 1.8 10\S\3/uL 1.1-3.5                   

 

             MONOCYTE # (test code = MO#) 0.5 10\S\3/uL 0.0-1.1                 

  

 

             EOSINOPH # (test code = EO#) 0.2 10\S\3/uL 0.0-0.7                 

  

 

             BASOPHIL # (test code = BA#) 0.0 10\S\3/uL 0.0-0.3                 

  

 

             IG # (test code = IG#) 0.03 10\S\3/uL 0.00-0.06                 

 

             NRBC # (test code = NRBC#) 0.00 10\S\3/uL 0.00-0.01                

 

 

             NEUTROPH % (test code = NE%) 64.0 %       35.0-73.0                

 

 

             LYMPH % (test code = LY%) 26.1 %       20.0-55.0                 

 

             MONO % (test code = MO%) 6.5 %        2.5-10.0                  

 

             EOSINOPH % (test code = EO%) 2.6 %        0.0-5.0                  

 

 

             BASOPHIL % (test code = BA%) 0.4 %        0.0-2.0                  

 

 

             IG % (test code = IG%) 0.4 %        0.0-0.8                   

 

             NRBC% (test code = NRBC%) 0.0 %        0.0-0.2                   

 

             MANDIFF (test code = MDIFF) NO           NO                        

 

             RBC MORPH (test code = RBCMOR)              NORMAL                 

   



URINE [id number] 09:53:00





             Test Item    Value        Reference Range Interpretation Comments

 

             Isolate 1 (test code = Proteus mirabilis              A            



             ISO1)                                               

 

             ampicillin (test code = am)  ug/mL                    S            

 

             ampicillin/sulbactam (test  ug/mL                    S            



             code = ams)                                         

 

             piperacillin/tazobactam  ug/mL                    S            



             (test code = tzp)                                        

 

             ceftazidime (test code =  ug/mL                    S            



             jeannine)                                                

 

             ceftriaxone1 (test code =  ug/mL                    S            



             ctr)                                                

 

             cefepime (test code = fep)  ug/mL                    S            

 

             aztreonam (test code = azm)  ug/mL                    S            

 

             ertapenem (test code = etp)  ug/mL                    S            

 

             meropenem (test code = mem)  ug/mL                    S            

 

             gentamicin (test code = gm)  ug/mL                    S            

 

             tobramycin (test code =  ug/mL                    S            



             tob)                                                

 

             levofloxacin (test code =  ug/mL                    S            



             lev)                                                

 

             trimethoprim/sulfamethoxazo  ug/mL                    S            



             le (test code = sxt)                                        



PARTHYROID HORMONE (INTACT)2019 08:24:00





             Test Item    Value        Reference Range Interpretation Comments

 

             PTH INTACT (test code 166.6 pg/mL  18.4-80.1    H            



             = A85)                                              

 

             Ref Range Change ***** Please note the                           



             (test code = REF change in reference                           



             RANGE)       range ***                              



VITAMIN D TOTAL 25 (OH)2019 07:15:00





             Test Item    Value        Reference Range Interpretation Comments

 

             VITAMIN D 25(OH) (test code = VD) 36.0 ng/mL   30.0-100.0          

      



SERUM PROTEIN [id number] 06:20:00





             Test Item    Value        Reference Range Interpretation Comments

 

             PROTEIN TOTAL (test 5.7 g/dL     6.1-8.1      L            TEST PER

FORMED AT:[facility]



             code = 65032358)                                        DIAGNOSTICS

[id number]



                                                                 [facility], [place]



                                                                 [id number] FELIX MD

 

             ALBUMIN (test code = 3.2 g/dL     3.8-4.8      L            



             [id number])                                           

 

             ALPHA-1-GLOBULINS 0.4 g/dL     0.2-0.3      H            



             (test code =                                        



             74241292)                                           

 

             ALPHA-2-GLOBULINS 0.8 g/dL     0.5-0.9                   



             (test code =                                        



             30668123)                                           

 

             BETA 1 GLOBULIN (test 0.5 g/dL     0.4-0.6                   



             code = 19204042)                                        

 

             BETA 2 GLOBULIN (test 0.2 g/dL     0.2-0.5                   



             code = 86754031)                                        

 

             GAMMA GLOBULINS (test 0.6 g/dL     0.8-1.7      L            



             code = 58782778)                                        

 

             INTERPRETATION (test                                        Pattern

 consistent with



             code = 12210768)                                        an acute ph

ase



                                                                 reactionConsist

ent with



                                                                 hypogammaglobul

inemia.



                                                                 Serum free ligh

tchains



                                                                 or urine immuno

fixation



                                                                 should be consi

dered



                                                                 ifplasma cell d

yscrasias



                                                                 are a possible



                                                                 clinicaldiagnos

is.TEST



                                                                 PERFORMED AT:[facility]

[facility]



                                                                 [facility]

[id number]



                                                                 [facility], [place]



                                                                 [id number] FELIX MD



[id number] 06:13:00





             Test Item    Value        Reference Range Interpretation Comments

 

             MAGNESIUM (test code = 48A) 1.7 mg/dL    1.8-2.4      L            



COMPREHENSIVE METABOLIC [id number] 06:13:00





             Test Item    Value        Reference Range Interpretation Comments

 

             GLUCOSE (test code = 06D) 110 mg/dL           H            

 

             SODIUM (test code = 01A) 140 mmol/L   136-145                   

 

             POTASSIUM (test code = 01B) 3.1 mmol/L   3.6-5.1      L            

 

             CHLORIDE (test code = 04A) 96 mmol/L           L            

 

             CO2 (test code = 02A) 34 mmol/L    22-32        H            

 

             ANION GAP (test code = ANG) 13.1 mmol/L                            

 

             BUN (test code = 05D) 33 mg/dL     7-18         H            

 

             CREATININE (test code = 03E) 1.5 mg/dL    0.4-1.1      H           

 

 

             BUN/CREA (test code = BCR) 22           12-20        H            

 

             CALCIUM (test code = 09D) 9.1 mg/dL    8.3-9.5                   

 

             BILI TOTAL (test code = 11A) 1.4 mg/dL    0.2-1.0      H           

 

 

             PROTEIN (test code = 07D) 7.0 g/dL     6.4-8.2                   

 

             ALBUMIN (test code = 08D) 3.2 g/dL     3.5-4.8      L            

 

             GLOBULIN (test code = GLB) 3.8 g/dL     1.5-3.8                   

 

             ALB/GLOB (test code = AGRR) 0.8          1.0-2.6      L            

 

             ALK PHOS (test code = 35A) 111 IU/L                         

 

             AST (test code = 30A) 18 IU/L      <=42                      

 

             ALT (test code = 31A) 43 IU/L      <=78                      



PHOSPHORUS (P04)2019 06:13:00





             Test Item    Value        Reference Range Interpretation Comments

 

             PHOSPHORUS (test code = 43D) 4.0 mg/dL    2.7-4.6                  

 



CBC (INCLUDES AUTOMATED DIFFERENTIAL)2019 05:48:00





             Test Item    Value        Reference Range Interpretation Comments

 

             WBC (test code = WBC) 9.4 10\S\3/uL 4.5-11.0                  

 

             RBC (test code = RBC) 3.73 10\S\6/uL 4.20-5.60    L            

 

             HGB (test code = HBG) 10.8 g/dL    12.0-15.5    L            

 

             HCT (test code = HCT) 33.8 %       35.0-44.0    L            

 

             MCV (test code = MCV) 90.6 fL      81.0-99.0                 

 

             MCH (test code = MCH) 29.0 pg      27.0-31.0                 

 

             MCHC (test code = MCHC) 32.0 g/dL    32.0-36.0                 

 

             RDW (test code = RDW) 15.1 %       11.5-14.5    H            

 

             PLT (test code = PLT) 189 10\S\3/uL 130-400                   

 

             MPV (test code = MPV) 11.8 fL      9.4-12.4                  

 

             NEUTROP # (test code = NE#) 7.0 10\S\3/uL 1.6-8.0                  

 

 

             LYMPH # (test code = LY#) 1.6 10\S\3/uL 1.1-3.5                   

 

             MONOCYTE # (test code = MO#) 0.7 10\S\3/uL 0.0-1.1                 

  

 

             EOSINOPH # (test code = EO#) 0.1 10\S\3/uL 0.0-0.7                 

  

 

             BASOPHIL # (test code = BA#) 0.0 10\S\3/uL 0.0-0.3                 

  

 

             IG # (test code = IG#) 0.06 10\S\3/uL 0.00-0.06                 

 

             NRBC # (test code = NRBC#) 0.00 10\S\3/uL 0.00-0.01                

 

 

             NEUTROPH % (test code = NE%) 74.5 %       35.0-73.0    H           

 

 

             LYMPH % (test code = LY%) 16.5 %       20.0-55.0    L            

 

             MONO % (test code = MO%) 7.0 %        2.5-10.0                  

 

             EOSINOPH % (test code = EO%) 1.1 %        0.0-5.0                  

 

 

             BASOPHIL % (test code = BA%) 0.3 %        0.0-2.0                  

 

 

             IG % (test code = IG%) 0.6 %        0.0-0.8                   

 

             NRBC% (test code = NRBC%) 0.0 %        0.0-0.2                   

 

             MANDIFF (test code = MDIFF) NO           NO                        

 

             RBC MORPH (test code = RBCMOR)              NORMAL                 

   



COMPREHENSIVE METABOLIC [id number] 05:30:00





             Test Item    Value        Reference Range Interpretation Comments

 

             GLUCOSE (test code = 06D) 122 mg/dL           H            

 

             SODIUM (test code = 01A) 140 mmol/L   136-145                   

 

             POTASSIUM (test code = 01B) 3.8 mmol/L   3.6-5.1                   

 

             CHLORIDE (test code = 04A) 100 mmol/L                       

 

             CO2 (test code = 02A) 32 mmol/L    22-32                     

 

             ANION GAP (test code = ANG) 11.8 mmol/L                            

 

             BUN (test code = 05D) 29 mg/dL     7-18         H            

 

             CREATININE (test code = 03E) 1.5 mg/dL    0.4-1.1      H           

 

 

             BUN/CREA (test code = BCR) 20           12-20                     

 

             CALCIUM (test code = 09D) 9.0 mg/dL    8.3-9.5                   

 

             BILI TOTAL (test code = 11A) 1.3 mg/dL    0.2-1.0      H           

 

 

             PROTEIN (test code = 07D) 7.1 g/dL     6.4-8.2                   

 

             ALBUMIN (test code = 08D) 3.5 g/dL     3.5-4.8                   

 

             GLOBULIN (test code = GLB) 3.6 g/dL     1.5-3.8                   

 

             ALB/GLOB (test code = AGRR) 1.0          1.0-2.6                   

 

             ALK PHOS (test code = 35A) 119 IU/L                         

 

             AST (test code = 30A) 22 IU/L      <=42                      

 

             ALT (test code = 31A) 49 IU/L      <=78                      



CBC (INCLUDES AUTOMATED DIFFERENTIAL)2019 05:18:00





             Test Item    Value        Reference Range Interpretation Comments

 

             WBC (test code = WBC) 9.0 10\S\3/uL 4.5-11.0                  

 

             RBC (test code = RBC) 3.94 10\S\6/uL 4.20-5.60    L            

 

             HGB (test code = HBG) 11.4 g/dL    12.0-15.5    L            

 

             HCT (test code = HCT) 35.8 %       35.0-44.0                 

 

             MCV (test code = MCV) 90.9 fL      81.0-99.0                 

 

             MCH (test code = MCH) 28.9 pg      27.0-31.0                 

 

             MCHC (test code = MCHC) 31.8 g/dL    32.0-36.0    L            

 

             RDW (test code = RDW) 14.8 %       11.5-14.5    H            

 

             PLT (test code = PLT) 164 10\S\3/uL 130-400                   

 

             MPV (test code = MPV) 11.6 fL      9.4-12.4                  

 

             NEUTROP # (test code = NE#) 6.8 10\S\3/uL 1.6-8.0                  

 

 

             LYMPH # (test code = LY#) 1.4 10\S\3/uL 1.1-3.5                   

 

             MONOCYTE # (test code = MO#) 0.6 10\S\3/uL 0.0-1.1                 

  

 

             EOSINOPH # (test code = EO#) 0.1 10\S\3/uL 0.0-0.7                 

  

 

             BASOPHIL # (test code = BA#) 0.0 10\S\3/uL 0.0-0.3                 

  

 

             IG # (test code = IG#) 0.06 10\S\3/uL 0.00-0.06                 

 

             NRBC # (test code = NRBC#) 0.00 10\S\3/uL 0.00-0.01                

 

 

             NEUTROPH % (test code = NE%) 75.6 %       35.0-73.0    H           

 

 

             LYMPH % (test code = LY%) 15.9 %       20.0-55.0    L            

 

             MONO % (test code = MO%) 6.5 %        2.5-10.0                  

 

             EOSINOPH % (test code = EO%) 0.9 %        0.0-5.0                  

 

 

             BASOPHIL % (test code = BA%) 0.4 %        0.0-2.0                  

 

 

             IG % (test code = IG%) 0.7 %        0.0-0.8                   

 

             NRBC% (test code = NRBC%) 0.0 %        0.0-0.2                   

 

             MANDIFF (test code = MDIFF) NO           NO                        

 

             RBC MORPH (test code = RBCMOR)              NORMAL                 

   



URINALYSIS WITH [id number] 06:44:00





             Test Item    Value        Reference Range Interpretation Comments

 

             COLOR (test code = COLU) YELLOW       YELLOW                    

 

             CLARITY (test code = CLA) CLOUDY       CLEAR        A            

 

             GLUCOSE UR (test code = UA NEGATIVE     NEGATIVE                  



             GLUCOSE)                                            

 

             BILI UR (test code = BILE) NEGATIVE     NEGATIVE                  

 

             KETONES UR (test code = ACE) NEGATIVE     NEGATIVE                 

 

 

             SP GRAVITY (test code = SPGR) 1.014        1.005-1.030             

  

 

             PH UR (test code = PH) 6.0          4.5-8.0                   

 

             PROTEIN UR (test code = PU) TRACE        NEGATIVE     A            

 

             UROBIL UR (test code = UROQ) 0.2 EU/dL    0.2-1.0                  

 

 

             NITRITE UR (test code = NEGATIVE     NEGATIVE                  



             NITRITE)                                            

 

             BLOOD UR (test code = UA BLOOD) TRACE        NEGATIVE     A        

    

 

             LEUK ES UR (test code = LEUK) 2+           NEGATIVE     A          

  

 

             WBC UR (test code = UWBC) 12 /HPF      0-5          H            

 

             RBC UR (test code = URBC) 1 /HPF       0-2                       

 

             EPITH  UR (test code = UEPC) FEW /LPF     FEW                      

 

 

             BACTERIA UR (test code = UBACT) MODERATE /HPF NONE         A       

     

 

             CAST UR (test code = CAST)  /LPF        NONE                      

 

             CRYSTAL UR (test code = CRYU)  / LPF       NONE                    

  

 

             MUCUS UR (test code = MUC)  / HPF       NONE                      

 

             AMORPH UR (test code = YASMEEN)  / HPF       NONE                     

 

 

             TRICH UR (test code = UTRICH)  /HPF        NONE                    

  

 

             YEAST UR (test code = UY)  /HPF        NONE                      

 

             SPERM UR (test code = USPERM)  /HPF        NONE                    

  



COMPREHENSIVE METABOLIC [id number] 05:24:00





             Test Item    Value        Reference Range Interpretation Comments

 

             GLUCOSE (test code = 06D) 105 mg/dL           H            

 

             SODIUM (test code = 01A) 138 mmol/L   136-145                   

 

             POTASSIUM (test code = 01B) 4.0 mmol/L   3.6-5.1                   

 

             CHLORIDE (test code = 04A) 104 mmol/L                       

 

             CO2 (test code = 02A) 25 mmol/L    22-32                     

 

             ANION GAP (test code = ANG) 13.0 mmol/L                            

 

             BUN (test code = 05D) 29 mg/dL     7-18         H            

 

             CREATININE (test code = 03E) 1.5 mg/dL    0.4-1.1      H           

 

 

             BUN/CREA (test code = BCR) 19           12-20                     

 

             CALCIUM (test code = 09D) 8.4 mg/dL    8.3-9.5                   

 

             BILI TOTAL (test code = 11A) 0.7 mg/dL    0.2-1.0                  

 

 

             PROTEIN (test code = 07D) 6.2 g/dL     6.4-8.2      L            

 

             ALBUMIN (test code = 08D) 3.1 g/dL     3.5-4.8      L            

 

             GLOBULIN (test code = GLB) 3.1 g/dL     1.5-3.8                   

 

             ALB/GLOB (test code = AGRR) 1.0          1.0-2.6                   

 

             ALK PHOS (test code = 35A) 104 IU/L                         

 

             AST (test code = 30A) 22 IU/L      <=42                      

 

             ALT (test code = 31A) 42 IU/L      <=78                      



CBC (INCLUDES AUTOMATED DIFFERENTIAL)2019 05:07:00





             Test Item    Value        Reference Range Interpretation Comments

 

             WBC (test code = WBC) 8.6 10\S\3/uL 4.5-11.0                  

 

             RBC (test code = RBC) 3.72 10\S\6/uL 4.20-5.60    L            

 

             HGB (test code = HBG) 10.8 g/dL    12.0-15.5    L            

 

             HCT (test code = HCT) 33.7 %       35.0-44.0    L            

 

             MCV (test code = MCV) 90.6 fL      81.0-99.0                 

 

             MCH (test code = MCH) 29.0 pg      27.0-31.0                 

 

             MCHC (test code = MCHC) 32.0 g/dL    32.0-36.0                 

 

             RDW (test code = RDW) 14.7 %       11.5-14.5    H            

 

             PLT (test code = PLT) 152 10\S\3/uL 130-400                   

 

             MPV (test code = MPV) 11.4 fL      9.4-12.4                  

 

             NEUTROP # (test code = NE#) 6.2 10\S\3/uL 1.6-8.0                  

 

 

             LYMPH # (test code = LY#) 1.6 10\S\3/uL 1.1-3.5                   

 

             MONOCYTE # (test code = MO#) 0.5 10\S\3/uL 0.0-1.1                 

  

 

             EOSINOPH # (test code = EO#) 0.2 10\S\3/uL 0.0-0.7                 

  

 

             BASOPHIL # (test code = BA#) 0.1 10\S\3/uL 0.0-0.3                 

  

 

             IG # (test code = IG#) 0.03 10\S\3/uL 0.00-0.06                 

 

             NRBC # (test code = NRBC#) 0.00 10\S\3/uL 0.00-0.01                

 

 

             NEUTROPH % (test code = NE%) 72.8 %       35.0-73.0                

 

 

             LYMPH % (test code = LY%) 18.5 %       20.0-55.0    L            

 

             MONO % (test code = MO%) 5.8 %        2.5-10.0                  

 

             EOSINOPH % (test code = EO%) 2.0 %        0.0-5.0                  

 

 

             BASOPHIL % (test code = BA%) 0.6 %        0.0-2.0                  

 

 

             IG % (test code = IG%) 0.3 %        0.0-0.8                   

 

             NRBC% (test code = NRBC%) 0.0 %        0.0-0.2                   

 

             MANDIFF (test code = MDIFF) NO           NO                        

 

             RBC MORPH (test code = RBCMOR)              NORMAL                 

   



U/S KIDNEY (RENAL)2019 23:20:42LOCATION: [id number]: 63-year-old female 
who presents with acute nontraumatic kidney injury.COMMENT:Sonographic imaging 
of this patient's retroperitoneum was performed.The right kidney measures 9.9 x 
5.9 x 6.4 cm with a 13 mm cortical thickness. Acyst is seen in the upper pole 
measuring 23 x 19 x 19 mm, and a second cyst isseen in the lower pole measuring 
18 x 16 x 16 mm.The left kidney measures 9.4 x 5.3 x 5.6 cm with a 11 mm 
cortical thickness. Nocysts or masses are seen in the left kidney.Cortical 
echotexture of the kidneys otherwise is unremarkable.There is no evidence of 
hydronephrosis of either kidney.In the urinary bladder only the left urine jet 
was observed on the color flowstudy. The bladder otherwise is 
unremarkable.IMPRESSION:Cystic changes are seen in the upper pole and lower pole
ofthis patient'sright kidney. No gross abnormalities are seen elsewhere in 
either kidney.The urinary bladder is unremarkable. Only the left urine jet was 
observed onthe color flow study.TROPONIN -22-05 07:14:00





             Test Item    Value        Reference Range Interpretation Comments

 

             TROPONIN I (test code = A84) <0.015 ng/mL 0.000-0.045              

 



COMPREHENSIVE METABOLIC [id number] 05:28:00





             Test Item    Value        Reference Range Interpretation Comments

 

             GLUCOSE (test code = 06D) 110 mg/dL           H            

 

             SODIUM (test code = 01A) 138 mmol/L   136-145                   

 

             POTASSIUM (test code = 01B) 3.9 mmol/L   3.6-5.1                   

 

             CHLORIDE (test code = 04A) 106 mmol/L                       

 

             CO2 (test code = 02A) 24 mmol/L    22-32                     

 

             ANION GAP (test code = ANG) 11.9 mmol/L                            

 

             BUN (test code = 05D) 22 mg/dL     7-18         H            

 

             CREATININE (test code = 03E) 1.5 mg/dL    0.4-1.1      H           

 

 

             BUN/CREA (test code = BCR) 15           12-20                     

 

             CALCIUM (test code = 09D) 8.2 mg/dL    8.3-9.5      L            

 

             BILI TOTAL (test code = 11A) 0.6 mg/dL    0.2-1.0                  

 

 

             PROTEIN (test code = 07D) 6.0 g/dL     6.4-8.2      L            

 

             ALBUMIN (test code = 08D) 2.9 g/dL     3.5-4.8      L            

 

             GLOBULIN (test code = GLB) 3.1 g/dL     1.5-3.8                   

 

             ALB/GLOB (test code = AGRR) 0.9          1.0-2.6      L            

 

             ALK PHOS (test code = 35A) 96 IU/L                          

 

             AST (test code = 30A) 19 IU/L      <=42                      

 

             ALT (test code = 31A) 35 IU/L      <=78                      



CBC (INCLUDES AUTOMATED DIFFERENTIAL)2019 05:14:00





             Test Item    Value        Reference Range Interpretation Comments

 

             WBC (test code = WBC) 7.0 10\S\3/uL 4.5-11.0                  

 

             RBC (test code = RBC) 3.64 10\S\6/uL 4.20-5.60    L            

 

             HGB (test code = HBG) 10.6 g/dL    12.0-15.5    L            

 

             HCT (test code = HCT) 33.5 %       35.0-44.0    L            

 

             MCV (test code = MCV) 92.0 fL      81.0-99.0                 

 

             MCH (test code = MCH) 29.1 pg      27.0-31.0                 

 

             MCHC (test code = MCHC) 31.6 g/dL    32.0-36.0    L            

 

             RDW (test code = RDW) 14.9 %       11.5-14.5    H            

 

             PLT (test code = PLT) 145 10\S\3/uL 130-400                   

 

             MPV (test code = MPV) 11.4 fL      9.4-12.4                  

 

             NEUTROP # (test code = NE#) 4.2 10\S\3/uL 1.6-8.0                  

 

 

             LYMPH # (test code = LY#) 2.1 10\S\3/uL 1.1-3.5                   

 

             MONOCYTE # (test code = MO#) 0.5 10\S\3/uL 0.0-1.1                 

  

 

             EOSINOPH # (test code = EO#) 0.1 10\S\3/uL 0.0-0.7                 

  

 

             BASOPHIL # (test code = BA#) 0.0 10\S\3/uL 0.0-0.3                 

  

 

             IG # (test code = IG#) 0.04 10\S\3/uL 0.00-0.06                 

 

             NRBC # (test code = NRBC#) 0.00 10\S\3/uL 0.00-0.01                

 

 

             NEUTROPH % (test code = NE%) 59.7 %       35.0-73.0                

 

 

             LYMPH % (test code = LY%) 30.1 %       20.0-55.0                 

 

             MONO % (test code = MO%) 7.0 %        2.5-10.0                  

 

             EOSINOPH % (test code = EO%) 2.0 %        0.0-5.0                  

 

 

             BASOPHIL % (test code = BA%) 0.6 %        0.0-2.0                  

 

 

             IG % (test code = IG%) 0.6 %        0.0-0.8                   

 

             NRBC% (test code = NRBC%) 0.0 %        0.0-0.2                   

 

             MANDIFF (test code = MDIFF) NO           NO                        

 

             RBC MORPH (test code = RBCMOR)              NORMAL                 

   



COMPREHENSIVE METABOLIC PAN2019-08-15 04:58:00





             Test Item    Value        Reference Range Interpretation Comments

 

             GLUCOSE (test code = 06D) 112 mg/dL           H            

 

             SODIUM (test code = 01A) 143 mmol/L   136-145                   

 

             POTASSIUM (test code = 01B) 4.4 mmol/L   3.6-5.1                   

 

             CHLORIDE (test code = 04A) 108 mmol/L          H            

 

             CO2 (test code = 02A) 27 mmol/L    22-32                     

 

             ANION GAP (test code = ANG) 12.4 mmol/L                            

 

             BUN (test code = 05D) 19 mg/dL     7-18         H            

 

             CREATININE (test code = 03E) 1.4 mg/dL    0.4-1.1      H           

 

 

             BUN/CREA (test code = BCR) 14           12-20                     

 

             CALCIUM (test code = 09D) 8.5 mg/dL    8.3-9.5                   

 

             BILI TOTAL (test code = 11A) 0.9 mg/dL    0.2-1.0                  

 

 

             PROTEIN (test code = 07D) 6.2 g/dL     6.4-8.2      L            

 

             ALBUMIN (test code = 08D) 2.9 g/dL     3.5-4.8      L            

 

             GLOBULIN (test code = GLB) 3.3 g/dL     1.5-3.8                   

 

             ALB/GLOB (test code = AGRR) 0.9          1.0-2.6      L            

 

             ALK PHOS (test code = 35A) 95 IU/L                          

 

             AST (test code = 30A) 19 IU/L      <=42                      

 

             ALT (test code = 31A) 40 IU/L      <=78                      



CBC (INCLUDES AUTOMATED DIFFERENTIAL)2019-08-15 04:51:00





             Test Item    Value        Reference Range Interpretation Comments

 

             WBC (test code = WBC) 8.2 10\S\3/uL 4.5-11.0                  

 

             RBC (test code = RBC) 3.48 10\S\6/uL 4.20-5.60    L            

 

             HGB (test code = HBG) 10.3 g/dL    12.0-15.5    L            

 

             HCT (test code = HCT) 32.4 %       35.0-44.0    L            

 

             MCV (test code = MCV) 93.1 fL      81.0-99.0                 

 

             MCH (test code = MCH) 29.6 pg      27.0-31.0                 

 

             MCHC (test code = MCHC) 31.8 g/dL    32.0-36.0    L            

 

             RDW (test code = RDW) 15.5 %       11.5-14.5    H            

 

             PLT (test code = PLT) 163 10\S\3/uL 130-400                   

 

             MPV (test code = MPV) 11.8 fL      9.4-12.4                  

 

             NEUTROP # (test code = NE#) 5.3 10\S\3/uL 1.6-8.0                  

 

 

             LYMPH # (test code = LY#) 2.0 10\S\3/uL 1.1-3.5                   

 

             MONOCYTE # (test code = MO#) 0.6 10\S\3/uL 0.0-1.1                 

  

 

             EOSINOPH # (test code = EO#) 0.2 10\S\3/uL 0.0-0.7                 

  

 

             BASOPHIL # (test code = BA#) 0.0 10\S\3/uL 0.0-0.3                 

  

 

             IG # (test code = IG#) 0.03 10\S\3/uL 0.00-0.06                 

 

             NRBC # (test code = NRBC#) 0.00 10\S\3/uL 0.00-0.01                

 

 

             NEUTROPH % (test code = NE%) 65.5 %       35.0-73.0                

 

 

             LYMPH % (test code = LY%) 24.2 %       20.0-55.0                 

 

             MONO % (test code = MO%) 7.2 %        2.5-10.0                  

 

             EOSINOPH % (test code = EO%) 2.2 %        0.0-5.0                  

 

 

             BASOPHIL % (test code = BA%) 0.5 %        0.0-2.0                  

 

 

             IG % (test code = IG%) 0.4 %        0.0-0.8                   

 

             NRBC% (test code = NRBC%) 0.0 %        0.0-0.2                   

 

             MANDIFF (test code = MDIFF) NO           NO                        



CARDIAC [id number] 23:10:00





             Test Item    Value        Reference Range Interpretation Comments

 

             TROPONIN I (test code = A84) <0.015 ng/mL 0.000-0.045              

 



U/S VENOUS DOPPLER IVON LOW [id number] 22:23:14LOCATION: [id number]: 
63-year-old female who presents with bilateral leg swelling.COMMENT:    Sonogra
[facility] imaging of the venous anatomy in both of this patient's legs wasobtained 
utilizing grayscale, color-flow, and Doppler waveform imagingmodalities.The 
common femoral veins, superficial femoral veins, popliteal veins, 
posteriortibial veins, anterior tibial veins, and peroneal veins in both legs 
wereincluded in the study.The anatomy exhibits normal compressibility on 
grayscale study. No suspiciousfilling defects are seen on the color flow study. 
Appropriate waveform responseas are noted on the Doppler study during 
augmentation maneuvers and duringquiet respiration.    IMPRESSION:There is no 
sonographic evidence of venous thrombosis in either of thispatient's legs.
CARDIAC [id number] 16:50:00





             Test Item    Value        Reference Range Interpretation Comments

 

             TROPONIN I (test code = A84) <0.015 ng/mL 0.000-0.045              

 



BRAIN NATRIURETIC [id number] 14:39:00





             Test Item    Value        Reference Range Interpretation Comments

 

             proBNP (test code = PBNP) 1337 pg/mL   0-125        H            



THYROID PANEL/SCREEN (TSH)2019 12:05:00





             Test Item    Value        Reference Range Interpretation Comments

 

             TSH (test code = A57) 0.989 uIU/mL 0.358-3.740               



[id number] 12:02:00





             Test Item    Value        Reference Range Interpretation Comments

 

             CPK (test code = 32A) 98 IU/L                          



AMYLASE AND [id number] 12:02:00





             Test Item    Value        Reference Range Interpretation Comments

 

             AMYLASE (test code = 10A) 19 U/L              L            

 

             LIPASE (test code = 60A) 67 IU/L             L            



COMPREHENSIVE METABOLIC [id number] 12:02:00





             Test Item    Value        Reference Range Interpretation Comments

 

             GLUCOSE (test code = 06D) 109 mg/dL           H            

 

             SODIUM (test code = 01A) 142 mmol/L   136-145                   

 

             POTASSIUM (test code = 01B) 4.2 mmol/L   3.6-5.1                   

 

             CHLORIDE (test code = 04A) 109 mmol/L          H            

 

             CO2 (test code = 02A) 26 mmol/L    22-32                     

 

             ANION GAP (test code = ANG) 11.2 mmol/L                            

 

             BUN (test code = 05D) 16 mg/dL     7-18                      

 

             CREATININE (test code = 03E) 1.3 mg/dL    0.4-1.1      H           

 

 

             BUN/CREA (test code = BCR) 13           12-20                     

 

             CALCIUM (test code = 09D) 8.7 mg/dL    8.3-9.5                   

 

             BILI TOTAL (test code = 11A) 1.3 mg/dL    0.2-1.0      H           

 

 

             PROTEIN (test code = 07D) 6.5 g/dL     6.4-8.2                   

 

             ALBUMIN (test code = 08D) 3.2 g/dL     3.5-4.8      L            

 

             GLOBULIN (test code = GLB) 3.3 g/dL     1.5-3.8                   

 

             ALB/GLOB (test code = AGRR) 1.0          1.0-2.6                   

 

             ALK PHOS (test code = 35A) 101 IU/L                         

 

             AST (test code = 30A) 21 IU/L      <=42                      

 

             ALT (test code = 31A) 41 IU/L      <=78                      



TROPONIN [id number] 11:57:00





             Test Item    Value        Reference Range Interpretation Comments

 

             TROPONIN I (test code = A84) <0.015 ng/mL 0.000-0.045              

 



XR CHEST 1 VIEW [id number] 11:55:22EXAM: XR CHEST 1 VIEW 
PORTABLE.LOCATION: D4.HISTORY: [id number]: Chest pain.COMPARISON: Radiograph dated
2019.TECHNIQUE: Single AP view of the chest was obtained. FINDINGS:The 
heart is enlarged in size. Small left pleural effusion and left basilaropacities
are present. There is elevation of the right hemidiaphragm. No acuteosseous 
abnormality is identified.IMPRESSION:Small left pleural effusion with left 
basilar opacities, which may representatelectasis or infiltrates.Cardiomegaly.
PRO TIME AND [id number] 11:43:00





             Test Item    Value        Reference Range Interpretation Comments

 

             PT (test code = 13.4 s       9.8-13.6                  



             TT)                                                 

 

             INR (test code = 1.2                                    



             INR)                                                

 

             INRH (test code =      **** SUGGESTED                           



             INRH)        THERAPEUTIC RANGE FOR INR:                           



                          ****    2.5 - 3.5 **  For                           



                          Patients with Prosthetic                           



                          Valves or Patients with                           



                                          recurrent                           



                          Thromboembolic Events **                           



                          2.0 - 3.0 ** For Most Other                           



                          Applications **                           

 

             PTT (test code = 30.4 s       20.2-38.0                 



             PTT)                                                

 

             PTTH (test code = **** To monitor the                           



             PTTH)        effectiveness of heparin,                           



                          we offer the Anti-Xa                           



                          (Heparin Assay). It can be                           



                          used for either                           



                          unfractionated or LMW                           



                          Heparin. Order Code is                           



                          ANTI-XA ****                           



CBC (INCLUDES AUTOMATED DIFFERENTIAL)2019 11:36:00





             Test Item    Value        Reference Range Interpretation Comments

 

             WBC (test code = WBC) 7.6 10\S\3/uL 4.5-11.0                  

 

             RBC (test code = RBC) 3.53 10\S\6/uL 4.20-5.60    L            

 

             HGB (test code = HBG) 10.3 g/dL    12.0-15.5    L            

 

             HCT (test code = HCT) 33.5 %       35.0-44.0    L            

 

             MCV (test code = MCV) 94.9 fL      81.0-99.0                 

 

             MCH (test code = MCH) 29.2 pg      27.0-31.0                 

 

             MCHC (test code = MCHC) 30.7 g/dL    32.0-36.0    L            

 

             RDW (test code = RDW) 15.4 %       11.5-14.5    H            

 

             PLT (test code = PLT) 164 10\S\3/uL 130-400                   

 

             MPV (test code = MPV) 11.7 fL      9.4-12.4                  

 

             NEUTROP # (test code = NE#) 5.6 10\S\3/uL 1.6-8.0                  

 

 

             LYMPH # (test code = LY#) 1.4 10\S\3/uL 1.1-3.5                   

 

             MONOCYTE # (test code = MO#) 0.4 10\S\3/uL 0.0-1.1                 

  

 

             EOSINOPH # (test code = EO#) 0.1 10\S\3/uL 0.0-0.7                 

  

 

             BASOPHIL # (test code = BA#) 0.0 10\S\3/uL 0.0-0.3                 

  

 

             IG # (test code = IG#) 0.04 10\S\3/uL 0.00-0.06                 

 

             NRBC # (test code = NRBC#) 0.00 10\S\3/uL 0.00-0.01                

 

 

             NEUTROPH % (test code = NE%) 73.9 %       35.0-73.0    H           

 

 

             LYMPH % (test code = LY%) 18.0 %       20.0-55.0    L            

 

             MONO % (test code = MO%) 5.7 %        2.5-10.0                  

 

             EOSINOPH % (test code = EO%) 1.6 %        0.0-5.0                  

 

 

             BASOPHIL % (test code = BA%) 0.3 %        0.0-2.0                  

 

 

             IG % (test code = IG%) 0.5 %        0.0-0.8                   

 

             NRBC% (test code = NRBC%) 0.0 %        0.0-0.2                   

 

             MANDIFF (test code = MDIFF) NO           NO                        

 

             RBC MORPH (test code = RBCMOR)              NORMAL                 

   



CBC WITH  [id number] 15:29:00





             Test Item    Value        Reference Range Interpretation Comments

 

             WBC (test code = WBC) 9.7 10\S\3/uL 4.5-11.0                  

 

             RBC (test code = RBC) 3.73 10\S\6/uL 4.20-5.60    L            

 

             HGB (test code = HBG) 11.0 g/dL    12.0-15.5    L            

 

             HCT (test code = HCT) 34.1 %       35.0-44.0    L            

 

             MCV (test code = MCV) 91.4 fL      81.0-99.0                 

 

             MCH (test code = MCH) 29.5 pg      27.0-31.0                 

 

             MCHC (test code = MCHC) 32.3 g/dL    32.0-36.0                 

 

             RDW (test code = RDW) 14.2 %       11.5-14.5                 

 

             PLT (test code = PLT) 116 10\S\3/uL 130-400      L            

 

             MPV (test code = MPV) 11.8 fL      9.4-12.4                  

 

             NEUTROP # (test code = NE#) 7.9 10\S\3/uL 1.6-8.0                  

 

 

             LYMPH # (test code = LY#) 1.0 10\S\3/uL 1.1-3.5      L            

 

             MONOCYTE # (test code = 0.6 10\S\3/uL 0.0-1.1                   



             MO#)                                                

 

             EOSINOPH # (test code = 0.1 10\S\3/uL 0.0-0.7                   



             EO#)                                                

 

             BASOPHIL # (test code = 0.0 10\S\3/uL 0.0-0.3                   



             BA#)                                                

 

             IG # (test code = IG#) 0.04 10\S\3/uL 0.00-0.06                 

 

             NRBC # (test code = NRBC#) 0.00 10\S\3/uL 0.00-0.01                

 

 

             NEUTROPH % (test code = 81.6 %       35.0-73.0    H            



             NE%)                                                

 

             LYMPH % (test code = LY%) 10.3 %       20.0-55.0    L            

 

             MONO % (test code = MO%) 6.4 %        2.5-10.0                  

 

             EOSINOPH % (test code = 1.0 %        0.0-5.0                   



             EO%)                                                

 

             BASOPHIL % (test code = 0.3 %        0.0-2.0                   



             BA%)                                                

 

             IG % (test code = IG%) 0.4 %        0.0-0.8                   

 

             NRBC% (test code = NRBC%) 0.0 %        0.0-0.2                   

 

             PLT EST (test code = ADEQUATE     ADEQUATE                  



             PLTEST)                                             

 

             PLT MORPH (test code = NORMAL (1.5-3 um) NORMAL                    



             PLTMOR)                                             



BASIC METABOLIC [id number] 15:26:00





             Test Item    Value        Reference Range Interpretation Comments

 

             GLUCOSE (test code = 06D) 118 mg/dL           H            

 

             SODIUM (test code = 01A) 136 mmol/L   136-145                   

 

             POTASSIUM (test code = 01B) 4.2 mmol/L   3.6-5.1                   

 

             CHLORIDE (test code = 04A) 106 mmol/L                       

 

             CO2 (test code = 02A) 24 mmol/L    22-32                     

 

             ANION GAP (test code = ANG) 10.2 mmol/L                            

 

             BUN (test code = 05D) 38 mg/dL     7-18         H            

 

             CREATININE (test code = 03E) 1.6 mg/dL    0.4-1.1      H           

 

 

             BUN/CREA (test code = BCR) 23           12-20        H            

 

             CALCIUM (test code = 09D) 9.2 mg/dL    8.3-9.5                   



CARDIAC [id number] 06:04:00





             Test Item    Value        Reference Range Interpretation Comments

 

             TROPONIN I (test code = A84) <0.015 ng/mL 0.000-0.045              

 



BASIC METABOLIC [id number] 05:52:00





             Test Item    Value        Reference Range Interpretation Comments

 

             GLUCOSE (test code = 06D) 171 mg/dL           H            

 

             SODIUM (test code = 01A) 138 mmol/L   136-145                   

 

             POTASSIUM (test code = 01B) 4.0 mmol/L   3.6-5.1                   

 

             CHLORIDE (test code = 04A) 107 mmol/L                       

 

             CO2 (test code = 02A) 23 mmol/L    22-32                     

 

             ANION GAP (test code = ANG) 12.0 mmol/L                            

 

             BUN (test code = 05D) 19 mg/dL     7-18         H            

 

             CREATININE (test code = 03E) 1.2 mg/dL    0.4-1.1      H           

 

 

             BUN/CREA (test code = BCR) 15           12-20                     

 

             CALCIUM (test code = 09D) 8.5 mg/dL    8.3-9.5                   



CBC (INCLUDES AUTOMATED DIFFERENTIAL)2019 05:30:00





             Test Item    Value        Reference Range Interpretation Comments

 

             WBC (test code = WBC) 14.0 10\S\3/uL 4.5-11.0     H            

 

             RBC (test code = RBC) 3.64 10\S\6/uL 4.20-5.60    L            

 

             HGB (test code = HBG) 10.8 g/dL    12.0-15.5    L            

 

             HCT (test code = HCT) 33.2 %       35.0-44.0    L            

 

             MCV (test code = MCV) 91.2 fL      81.0-99.0                 

 

             MCH (test code = MCH) 29.7 pg      27.0-31.0                 

 

             MCHC (test code = MCHC) 32.5 g/dL    32.0-36.0                 

 

             RDW (test code = RDW) 14.0 %       11.5-14.5                 

 

             PLT (test code = PLT) 143 10\S\3/uL 130-400                   

 

             MPV (test code = MPV) 11.4 fL      9.4-12.4                  

 

             NEUTROP # (test code = NE#) 12.1 10\S\3/uL 1.6-8.0      H          

  

 

             LYMPH # (test code = LY#) 0.9 10\S\3/uL 1.1-3.5      L            

 

             MONOCYTE # (test code = MO#) 0.9 10\S\3/uL 0.0-1.1                 

  

 

             EOSINOPH # (test code = EO#) 0.0 10\S\3/uL 0.0-0.7                 

  

 

             BASOPHIL # (test code = BA#) 0.0 10\S\3/uL 0.0-0.3                 

  

 

             IG # (test code = IG#) 0.10 10\S\3/uL 0.00-0.06    H            

 

             NRBC # (test code = NRBC#) 0.00 10\S\3/uL 0.00-0.01                

 

 

             NEUTROPH % (test code = NE%) 86.2 %       35.0-73.0    H           

 

 

             LYMPH % (test code = LY%) 6.3 %        20.0-55.0    L            

 

             MONO % (test code = MO%) 6.6 %        2.5-10.0                  

 

             EOSINOPH % (test code = EO%) 0.0 %        0.0-5.0                  

 

 

             BASOPHIL % (test code = BA%) 0.2 %        0.0-2.0                  

 

 

             IG % (test code = IG%) 0.7 %        0.0-0.8                   

 

             NRBC% (test code = NRBC%) 0.0 %        0.0-0.2                   

 

             MANDIFF (test code = MDIFF) NO           NO                        

 

             RBC MORPH (test code = RBCMOR)              NORMAL                 

   



CARDIAC [id number] 23:08:00





             Test Item    Value        Reference Range Interpretation Comments

 

             TROPONIN I (test code = A84) <0.015 ng/mL 0.000-0.045              

 



CT DISSECTION [id number] 19:26:35Exam: CT thorax PE protocol.Location: H
12History: [id number]: Chest painTechnique: Enhanced spiral slices were taken from
the apices of the lungs,through the upper abdomen utilizing a pulmonary embolism
protocol. Multiplanarreformations were performed. 100cc of Omnipaque were used. 
One or more of thefollowing radiation dose reduction techniques was used: 
automated exposurecontrol, adjustment of mA and/or KV according to patient size,
and/orutilization of iterative reconstruction technique.Findings:Nofilling 
defects are seen in the main pulmonary arteries. The pulmonaryvasculature is 
normal. No pulmonary venous congestion or arterial hypertensionis seen.The lungs
are clear. No infiltration or effusion is seen. No mass or nodule isseen.The 
mediastinum and the pulmonary kandis are normal. Calcified granulomas arenoted. No
lymphadenopathy is present. The heart size is  normal.No pericardialeffusion is
seen.The  visualized upper abdominal organs are unremarkable.Impression:1. 
Negative for pulmonary embolism.2. No acute disease.THYROID PANEL/SCREEN (TSH)
2019 18:29:00





             Test Item    Value        Reference Range Interpretation Comments

 

             TSH (test code = A57) 0.706 uIU/mL 0.358-3.740               



LIVER [id number] 18:28:00





             Test Item    Value        Reference Range Interpretation Comments

 

             BILI TOTAL (test code = 11A) 0.9 mg/dL    0.2-1.0                  

 

 

             BILI DIRCT (test code = 12A) 0.2 mg/dL    0.0-0.2                  

 

 

             BILI INDIR (test code = BILII) 0.7 mg/dL    <=0.8                  

   

 

             PROTEIN (test code = 07D) 7.7 g/dL     6.4-8.2                   

 

             ALBUMIN (test code = 08D) 3.8 g/dL     3.5-4.8                   

 

             GLOBULIN (test code = GLB) 3.9 g/dL     1.5-3.8      H            

 

             ALB/GLOB (test code = AGRR) 1.0          1.0-2.6                   

 

             ALK PHOS (test code = 35A) 106 IU/L                         

 

             AST (test code = 30A) 25 IU/L      <=42                      

 

             ALT (test code = 31A) 21 IU/L      <=78                      



BRAIN NATRIURETIC [id number] 18:19:00





             Test Item    Value        Reference Range Interpretation Comments

 

             proBNP (test code = PBNP) 518 pg/mL    0-125        H            



[id number] 18:15:00





             Test Item    Value        Reference Range Interpretation Comments

 

             MAGNESIUM (test code = 48A) 1.9 mg/dL    1.8-2.4                   



[id number] 17:40:00





             Test Item    Value        Reference Range Interpretation Comments

 

             D-DIMER (test code = <200 ng/mL D-DU 0-234                     



             DDI)                                                

 

             D-DIMER COMMENT (test *Level to rule out                           



             code = DDCOM) DVT or PE: <235 ng/mL                           



                          D-DU*                                  



CARDIAC [id number] 17:31:00





             Test Item    Value        Reference Range Interpretation Comments

 

             TROPONIN I (test code = A84) <0.015 ng/mL 0.000-0.045              

 



BASIC METABOLIC [id number] 17:27:00





             Test Item    Value        Reference Range Interpretation Comments

 

             GLUCOSE (test code = 06D) 114 mg/dL           H            

 

             SODIUM (test code = 01A) 142 mmol/L   136-145                   

 

             POTASSIUM (test code = 01B) 4.0 mmol/L   3.6-5.1                   

 

             CHLORIDE (test code = 04A) 111 mmol/L          H            

 

             CO2 (test code = 02A) 26 mmol/L    22-32                     

 

             ANION GAP (test code = ANG) 9.0 mmol/L                             

 

             BUN (test code = 05D) 19 mg/dL     7-18         H            

 

             CREATININE (test code = 03E) 1.2 mg/dL    0.4-1.1      H           

 

 

             BUN/CREA (test code = BCR) 15           12-20                     

 

             CALCIUM (test code = 09D) 9.2 mg/dL    8.3-9.5                   



CBC (INCLUDES AUTOMATED DIFFERENTIAL)2019 17:26:00





             Test Item    Value        Reference Range Interpretation Comments

 

             WBC (test code = WBC) 12.6 10\S\3/uL 4.5-11.0     H            

 

             RBC (test code = RBC) 4.04 10\S\6/uL 4.20-5.60    L            

 

             HGB (test code = HBG) 11.9 g/dL    12.0-15.5    L            

 

             HCT (test code = HCT) 37.6 %       35.0-44.0                 

 

             MCV (test code = MCV) 93.1 fL      81.0-99.0                 

 

             MCH (test code = MCH) 29.5 pg      27.0-31.0                 

 

             MCHC (test code = MCHC) 31.6 g/dL    32.0-36.0    L            

 

             RDW (test code = RDW) 13.8 %       11.5-14.5                 

 

             PLT (test code = PLT) 152 10\S\3/uL 130-400                   

 

             MPV (test code = MPV) 11.3 fL      9.4-12.4                  

 

             NEUTROP # (test code = NE#) 10.7 10\S\3/uL 1.6-8.0      H          

  

 

             LYMPH # (test code = LY#) 1.1 10\S\3/uL 1.1-3.5                   

 

             MONOCYTE # (test code = MO#) 0.6 10\S\3/uL 0.0-1.1                 

  

 

             EOSINOPH # (test code = EO#) 0.1 10\S\3/uL 0.0-0.7                 

  

 

             BASOPHIL # (test code = BA#) 0.0 10\S\3/uL 0.0-0.3                 

  

 

             IG # (test code = IG#) 0.06 10\S\3/uL 0.00-0.06                 

 

             NRBC # (test code = NRBC#) 0.00 10\S\3/uL 0.00-0.01                

 

 

             NEUTROPH % (test code = NE%) 85.2 %       35.0-73.0    H           

 

 

             LYMPH % (test code = LY%) 8.4 %        20.0-55.0    L            

 

             MONO % (test code = MO%) 5.0 %        2.5-10.0                  

 

             EOSINOPH % (test code = EO%) 0.6 %        0.0-5.0                  

 

 

             BASOPHIL % (test code = BA%) 0.3 %        0.0-2.0                  

 

 

             IG % (test code = IG%) 0.5 %        0.0-0.8                   

 

             NRBC% (test code = NRBC%) 0.0 %        0.0-0.2                   

 

             MANDIFF (test code = MDIFF) NO           NO                        



PTT (PARTIAL THROMBOPLASTIN TIME)2019 17:26:00





             Test Item    Value        Reference Range Interpretation Comments

 

             PTT (test code = 31.9 s       20.2-38.0                 



             PTT)                                                

 

             PTTH (test code = **** To monitor the                           



             PTTH)        effectiveness of heparin,                           



                          we offer the Anti-Xa                           



                          (Heparin Assay). It can be                           



                          used for either                           



                          unfractionated or LMW                           



                          Heparin. Order Code is                           



                          ANTI-XA ****                           



PROTHROMBIN [id number] 17:26:00





             Test Item    Value        Reference Range Interpretation Comments

 

             PT (test code = 12.0 s       9.8-13.6                  



             TT)                                                 

 

             INR (test code = 1.1                                    



             INR)                                                

 

             INRH (test code =      **** SUGGESTED                           



             INRH)        THERAPEUTIC RANGE FOR INR:                           



                          ****    2.5 - 3.5 **  For                           



                          Patients with Prosthetic                           



                          Valves or Patients with                           



                                          recurrent                           



                          Thromboembolic Events **                           



                          2.0 - 3.0 ** For Most Other                           



                          Applications **                           



XR CHEST 1 VIEW [id number] 17:04:28Portable AP chest, 1 viewLocation 
Code: [id number] HISTORY: Chest painCOMPARISON: NoneCOMMENT: Mild atelectasis is
present within the lung bases. The costophrenic angles aresharp. The 
cardiomediastinalsilhouette is unremarkable. The bones are intact.IMPRESSION: 
Mild bibasilar atelectasis. Otherwise, no acute abnormalityCHEM [id number] 
16:51:00





             Test Item    Value        Reference Range Interpretation Comments

 

             Creatinine Lvl (test code = Creatinine 1.15         0.50-1.40      

           



             Lvl)                                                



[facility]2017-01-24 16:51:00





             Test Item    Value        Reference Range Interpretation Comments

 

             BUN (test code = BUN) 16           -22                      



[facility]2017-01-24 16:51:00





             Test Item    Value        Reference Range Interpretation Comments

 

             Chloride Lvl (test code = Chloride Lvl) 109                  

            



[facility]2017-01-24 16:51:00





             Test Item    Value        Reference Range Interpretation Comments

 

             Potassium Lvl (test code = Potassium 4.3          3.5-5.1          

         



             Lvl)                                                



[facility]2017-01-24 16:51:00





             Test Item    Value        Reference Range Interpretation Comments

 

             Sodium Lvl (test code = Sodium Lvl) 144          135-145           

        



[facility]2017-01-24 16:51:00





             Test Item    Value        Reference Range Interpretation Comments

 

             CO2 (test code = CO2) 28           -                     



[facility]2017-01-24 16:51:00





             Test Item    Value        Reference Range Interpretation Comments

 

             Calcium Lvl (test code = Calcium Lvl) 8.4          8.5-10.5        

          



[facility]2017-01-24 16:51:00





             Test Item    Value        Reference Range Interpretation Comments

 

             AGAP (test code = AGAP) 11.3         10.0-20.0                 



[facility]2017-01-24 16:51:00





             Test Item    Value        Reference Range Interpretation Comments

 

             Glucose Lvl (test code = Glucose Lvl) 109          70-99           

          



[facility]2017-01-24 16:51:00





             Test Item    Value        Reference Range Interpretation Comments

 

             eGFR (test code = eGFR) 52                                     



[facility]

## 2022-01-05 VITALS — SYSTOLIC BLOOD PRESSURE: 104 MMHG | DIASTOLIC BLOOD PRESSURE: 73 MMHG | OXYGEN SATURATION: 99 %

## 2022-01-05 VITALS — TEMPERATURE: 97.6 F

## 2022-01-05 LAB
ALBUMIN SERPL BCP-MCNC: 2.6 G/DL (ref 3.4–5)
ALP SERPL-CCNC: 220 U/L (ref 45–117)
ALT SERPL W P-5'-P-CCNC: 32 U/L (ref 12–78)
AST SERPL W P-5'-P-CCNC: 24 U/L (ref 15–37)
BUN BLD-MCNC: 58 MG/DL (ref 7–18)
GLUCOSE SERPLBLD-MCNC: 112 MG/DL (ref 74–106)
HCT VFR BLD CALC: 24.1 % (ref 36–45)
INR BLD: 1.26
LYMPHOCYTES # SPEC AUTO: 1.6 K/UL (ref 0.7–4.9)
MAGNESIUM SERPL-MCNC: 2.5 MG/DL (ref 1.8–2.4)
NT-PROBNP SERPL-MCNC: (no result) PG/ML (ref ?–125)
PMV BLD: 8.2 FL (ref 7.6–11.3)
POTASSIUM SERPL-SCNC: 5.5 MMOL/L (ref 3.5–5.1)
RBC # BLD: 2.37 M/UL (ref 3.86–4.86)
SARS-COV-2 RNA RESP QL NAA+PROBE: NEGATIVE
TROPONIN (EMERG DEPT USE ONLY): < 0.02 NG/ML (ref 0–0.04)
TSH SERPL DL<=0.05 MIU/L-ACNC: 2.57 UIU/ML (ref 0.36–3.74)
UA DIPSTICK W REFLEX MICRO PNL UR: (no result)

## 2022-01-05 RX ADMIN — MEROPENEM SCH: 1 INJECTION INTRAVENOUS at 04:15

## 2022-01-05 RX ADMIN — MEROPENEM SCH MLS: 1 INJECTION INTRAVENOUS at 04:15

## 2022-01-05 NOTE — EKG
Test Date:    2022-01-05               Test Time:    01:41:16

Technician:   ALL                                    

                                                     

MEASUREMENT RESULTS:                                       

Intervals:                                           

Rate:         81                                     

CA:                                                  

QRSD:         86                                     

QT:           350                                    

QTc:          406                                    

Axis:                                                

P:                                                   

CA:                                                  

QRS:          125                                    

T:            -7                                     

                                                     

INTERPRETIVE STATEMENTS:                                       

                                                     

Atrial fibrillation with a competing junctional pacemaker

Possible Right ventricular hypertrophy

Nonspecific T wave abnormality

Abnormal ECG

No previous ECG available for comparison



Electronically Signed On 01-05-22 11:22:12 CST by David Sullivan

## 2022-01-05 NOTE — RAD REPORT
EXAM DESCRIPTION:  CT - Stone Protocol - 1/5/2022 7:18 am

 

CLINICAL HISTORY:  66 years, Female, FLANK PAIN

 

COMPARISON:  None

 

TECHNIQUE:  Multiple transaxial tomograms of the abdomen and pelvis were performed from the lung base
s to the symphysis pubis 3 mm slice thickness at 3 mm interval reconstruction, without administration
 of IV and oral contrast.

Multiplanar reformats in the sagittal and coronal plane were generated and reviewed.

This exam was performed according to our departmental dose-optimization protocol, which includes auto
mated exposure control, adjustment of the mA and/or kV according to patient size and/or use of iterat
kyle reconstruction technique.

 

FINDINGS:  The lack of IV and oral contrast limits evaluation of solid organs, subtle lesions cannot 
be excluded.

The lung bases demonstrated presence of a central catheter within the cavoatrial junction/right atriu
m. Mild cardiomegaly. Minimal dependent atelectatic changes lung bases. Trace bilateral pleural effus
ions.

Grossly the unopacified liver is increase in size. There is small amount of air within the left inter
body biliary system with the presence of a biliary stent within the common bile duct/second portion o
f the duodenum.

Surgical clips within the gallbladder fossa correspond to previous cholecystectomy.

The pancreas, spleen and adrenal glands demonstrate to be within normal limits, no significant focal 
lesions were identified.

There is trace of fluid inferior subcapsular aspect of the liver.

The left kidney demonstrated presence of a left internal ureteral stent and place. Several upper mid 
pole radiodensities correspond to most likely calculi. There is no evidence for significant hydroneph
rosis.

The right kidney demonstrated presence of small upper calculi within the posterior aspect. There are 
upper and lower pole renal cyst.

Grossly the unopacified stomach, small bowel and large bowel demonstrate to be within normal limits. 
There is no evidence for bowel dilatation and/or free air. Mild fecal stasis. The appendix is suggest
ed on axial image [id number].

The urinary bladder demonstrate to be within normal limits. The uterus is unremarkable. Surgical clip
s within bilateral size of the uterus/fallopian tube areas correspond to tubal ligation. The aorta de
monstrate to be within normal limits.   There is no retroperitoneal lymphadenopathy.   There is no ev
idence for ascites.

The bone windows demonstrate mild diffuse bony osteopenia. No definitive compression deformity. Schmo
rl's node superior plate of L3. There is minimal haziness of the skin/subcutaneous tissue perhaps sug
gesting edema.

 

IMPRESSION:  Left internal ureteral stent and place. No evidence for significant hydronephrosis.

Bilateral nephrolithiasis.

Right renal cysts

Status post cholecystectomy with internal biliary stent in place and minimal amount of pneumobilia.

Mild fecal stasis.

Trace of fluid inferior subcapsular aspect of the liver.

Trace bilateral pleural effusions.

Mild cardiomegaly.

 

Electronically signed by:   Danny Delgado MD   1/5/2022 1:41 AM CST Workstation: [id number]

 

 

Due to temporary technical issues with the PACS/Fluency reporting system, reports are being signed by
 the in house radiologist without review as a courtesy to ensure prompt reporting. The interpreting r
adiologist is fully responsible for the content of the report.

## 2022-01-05 NOTE — EDPHYS
Physician Documentation                                                                           

 [facility]                                                                 

Name: Jlui Rushing                                                                             

Age: 66 yrs                                                                                       

Sex: Female                                                                                       

: 1955                                                                                   

MRN: [id number]                                                                                   

Arrival Date: 2022                                                                          

Time: 23:08                                                                                       

Account#: [id number]                                                                            

Bed 27                                                                                            

Private MD:                                                                                       

ED Physician Andrea Presley                                                                      

HPI:                                                                                              

                                                                                             

01:10 This 66 yrs old Female presents to ER via Wheelchair with complaints of Pain With       mh7 

      Urination.                                                                                  

01:10 The patient presents to the emergency department with nausea, that is moderate,         mh7 

      vomiting, that is intermittent, described as clear fluid. Onset: The symptoms/episode       

      began/occurred 4 day(s) ago. Possible causes: unknown. The symptoms are aggravated by       

      nothing. The symptoms are alleviated by nothing. Associated signs and symptoms:             

      Pertinent positives: abdominal pain, dysuria, nausea, vomiting, Low back pain,              

      Pertinent negatives: anorexia, belching, constipation, diarrhea, fever, flatulence, GI      

      bleeding, hematuria, vaginal discharge. Severity of symptoms: At their worst the            

      symptoms were moderate 3 day(s) ago, in the emergency department the symptoms are           

      unchanged.                                                                                  

                                                                                                  

Historical:                                                                                       

- Allergies:                                                                                      

                                                                                             

23:48 PENICILLINS;                                                                            tw5 

                                                                                             

02:36 cefepime;                                                                               al4 

02:36 QUINOLONES;                                                                             al4 

- Home Meds:                                                                                      

                                                                                             

23:48 midodrine 5 mg oral tab 2 tabs 3 times per day [Active]; sevelamer carbonate 800 mg     tw5 

      oral tab 2 tabs 3 times per day [Active]; carvedilol 3.125 mg oral tab 1 tab 2 times        

      per day [Active]; Eliquis 5 mg oral tab 1 tab 2 times per day [Active]; allopurinol 100     

      mg Oral tab 1 tab once daily [Active]; bumetanide 2 mg Oral tab 1 tab once daily            

      [Active]; digoxin 125 mcg (0.125 mg) Oral tab 1 tab once daily [Active]; atorvastatin       

      10 mg oral tab 1 tab once daily [Active]; gabapentin 300 mg oral cap 1 cap BID              

      [Active]; Colace 100 mg oral cap 1 cap once daily [Active]; Osteo Bi-Flex Triple            

      Strength 750 mg-644 mg- 30 mg-1 mg oral tab [Active]; tramadol 100 mg Oral Tb24 1 tab       

      once daily [Active];                                                                        

- PMHx:                                                                                           

23:48 Dialysis; High Cholesterol; Hypertensive disorder; stage 4 kidney failure;              tw5 

- PSHx:                                                                                           

23:48 None;                                                                                   tw5 

                                                                                                  

- Immunization history:: Flu vaccine is not up to date.                                           

- Social history:: Smoking status: Patient denies any tobacco usage or history of.                

                                                                                                  

                                                                                                  

ROS:                                                                                              

                                                                                             

01:10 Constitutional: Negative for fever, chills, and weight loss, Eyes: Negative for injury, mh7 

      pain, redness, and discharge, ENT: Negative for injury, pain, and discharge, Neck:          

      Negative for injury, pain, and swelling, Cardiovascular: Negative for chest pain,           

      palpitations, and edema, Respiratory: Negative for shortness of breath, cough,              

      wheezing, and pleuritic chest pain, MS/Extremity: Negative for injury and deformity,        

      Skin: Negative for injury, rash, and discoloration, Neuro: Negative for headache,           

      weakness, numbness, tingling, and seizure, Psych: Negative for depression, anxiety,         

      suicide ideation, homicidal ideation, and hallucinations, Allergy/Immunology: Negative      

      for hives, rash, and allergies, Endocrine: Negative for neck swelling, polydipsia,          

      polyuria, polyphagia, and marked weight changes, Hematologic/Lymphatic: Negative for        

      swollen nodes, abnormal bleeding, and unusual bruising.                                     

                                                                                                  

Exam:                                                                                             

01:10 Head/Face:  Normocephalic, atraumatic. Eyes:  Pupils equal round and reactive to light, mh7 

      extra-ocular motions intact.  Lids and lashes normal.  Conjunctiva and sclera are           

      non-icteric and not injected.  Cornea within normal limits.  Periorbital areas with no      

      swelling, redness, or edema. Neck:  Trachea midline, no thyromegaly or masses palpated,     

      and no cervical lymphadenopathy.  Supple, full range of motion without nuchal rigidity,     

      or vertebral point tenderness.  No Meningismus. Chest/axilla:  Normal chest wall            

      appearance and motion.  Nontender with no deformity.  No lesions are appreciated.           

      Cardiovascular:  Regular rate and rhythm with a normal S1 and S2.  No gallops, murmurs,     

      or rubs.  Normal PMI, no JVD.  No pulse deficits. Respiratory:  Lungs have equal breath     

      sounds bilaterally, clear to auscultation and percussion.  No rales, rhonchi or wheezes     

      noted.  No increased work of breathing, no retractions or nasal flaring. Abdomen/GI:        

      Soft, non-tender, with normal bowel sounds.  No distension or tympany.  No guarding or      

      rebound.  No evidence of tenderness throughout. Skin:  Warm, dry with normal turgor.        

      Normal color with no rashes, no lesions, and no evidence of cellulitis. MS/ Extremity:      

      Pulses equal, no cyanosis.  Neurovascular intact.  Full, normal range of motion. Neuro:     

       Awake and alert, GCS 15, oriented to person, place, time, and situation.  Cranial          

      nerves II-XII grossly intact.  Motor strength 5/5 in all extremities.  Sensory grossly      

      intact.  Cerebellar exam normal.  Normal gait. Psych:  Awake, alert, with orientation       

      to person, place and time.  Behavior, mood, and affect are within normal limits.            

01:10 Constitutional: The patient appears in no acute distress, alert, awake, uncomfortable.      

01:10 Back:  No spinal tenderness.  No costovertebral tenderness.  Full range of motion.      [facility] 

                                                                                                  

Vital Signs:                                                                                      

                                                                                             

23:45 BP 86 / 51; Pulse 65; Resp 22; Temp 97.6(TE); Pulse Ox 96% on 2 lpm NC; Weight 106.59   tw5 

      kg; Height 5 ft. 7 in. (170.18 cm); Pain 2/10;                                              

                                                                                             

00:00 BP 93 / 62; Pulse 76; Resp 20 S; Pulse Ox 100% on R/A;                                  al4 

00:30  / 47; Pulse 71; Resp 20 S; Pulse Ox 100% on R/A;                                 al4 

01:00 BP 79 / 57; Pulse 71; Resp 20 S; Pulse Ox 100% on R/A;                                  al4 

01:45  / 66; Pulse 66; Resp 20 S; Pulse Ox 100% on R/A;                                 al4 

02:30 BP 96 / 62; Pulse 73; Resp 20; Pulse Ox 100% ;                                          al4 

04:01  / 71; Pulse 73; Resp 20; Pulse Ox 100% ;                                         al4 

05:15  / 73; Pulse 100; Resp 20; Pulse Ox 99% ;                                         al4 

                                                                                             

23:45 Body Mass Index 36.81 (106.59 kg, 170.18 cm)                                            tw5 

                                                                                                  

MDM:                                                                                              

03:21 Differential diagnosis: Nonspecific abd pain, gastritis, pancreatitis, UTI,             7 

      pyelonephritis. Data reviewed: vital signs, nurses notes, old medical records, lab test     

      result(s), CBC, electrolytes, urinalysis, EKG, radiologic studies, CT scan, plain           

      films. Data interpreted: Pulse oximetry: on room air is 100 %. Interpretation: normal.      

      Counseling: I had a detailed discussion with the patient and/or guardian regarding: the     

      historical points, exam findings, and any diagnostic results supporting the                 

      discharge/admit diagnosis, lab results, radiology results, the need for further work-up     

      and treatment in the hospital. Response to treatment: the patient's symptoms have           

      mildly improved after treatment.                                                            

03:24 Patient medically screened.                                                             [facility] 

                                                                                                  

                                                                                             

23:45 Order name: Urine Microscopic Only                                                      [facility] 

                                                                                             

23:45 Order name: Urine Microscopic Only; Complete Time: 01:47                                [facility]

                                                                                             

00:54 Order name: Basic Metabolic Panel                                                       [facility] 

                                                                                             

00:54 Order name: CBC with Diff; Complete Time: 02:18                                         [facility] 

                                                                                             

00:54 Order name: LFT's                                                                       [facility] 

                                                                                             

00:54 Order name: Magnesium                                                                   [facility] 

                                                                                             

00:54 Order name: NT PRO-BNP                                                                  [facility] 

                                                                                             

00:54 Order name: PT-INR; Complete Time: 02:18                                                [facility] 

                                                                                             

00:54 Order name: Troponin (emerg Dept Use Only)                                              [facility] 

                                                                                             

00:54 Order name: COVID-19/FLU A+B (Document "Date of Onset" if Symptomatic); Complete Time:  [facility] 

      02:53                                                                                       

                                                                                             

01:09 Order name: Urine Culture                                                               [facility]

                                                                                             

01:38 Order name: Urinalysis; Complete Time: 01:47                                            [facility]

                                                                                             

01:49 Order name: Blood Culture Adult (2)                                                     [facility] 

                                                                                             

00:54 Order name: XRAY Chest (1 view)                                                         [facility] 

                                                                                             

00:54 Order name: EKG; Complete Time: 00:55                                                   [facility] 

                                                                                             

00:55 Order name: CT Stone Protocol                                                           [facility] 

                                                                                             

04:11 Order name: Comprehensive Metabolic Panel                                               [facility]

                                                                                             

04:11 Order name: Comprehensive Metabolic Panel                                               [facility]

                                                                                             

04:11 Order name: CONS Physician Consult                                                      [facility]

                                                                                             

04:11 Order name: Renal                                                                       [facility]

                                                                                             

04:11 Order name: Comprehensive Metabolic Panel                                               [facility]

                                                                                             

04:11 Order name: Comprehensive Metabolic Panel                                               [facility]

                                                                                             

04:11 Order name: Comprehensive Metabolic Panel                                               [facility]

                                                                                             

04:17 Order name: Thyroid Stimulating Hormone                                                 [facility]

                                                                                             

08:21 Order name: Glucose, Ancillary Testing                                                  [facility]

                                                                                             

12:42 Order name: Glucose, Ancillary Testing                                                  [facility]

                                                                                             

00:54 Order name: Cardiac monitoring; Complete Time: 02:28                                    [facility] 

                                                                                             

00:54 Order name: EKG - Nurse/Tech; Complete Time: 01:44                                      [facility] 

                                                                                             

00:54 Order name: IV Saline Lock; Complete Time: :44                                        [facility] 

                                                                                             

00:54 Order name: Labs collected and sent; Complete Time: :45                               [facility] 

                                                                                             

00:54 Order name: O2 Per Protocol; Complete Time: :45                                       [facility] 

                                                                                             

00:54 Order name: O2 Sat Monitoring; Complete Time: :45                                     [facility] 

                                                                                                  

Administered Medications:                                                                         

02:27 Not Given (Physician Discretion): LevaQUIN (levofloxacin) 500 mg 100 ml IVPB once over  al4 

      60 mins                                                                                     

04:00 Drug: Azactam 1 grams Route: IVPB; Infused Over: 30 mins; Site: right antecubital;      al4 

04:32 Follow up: Response: No adverse reaction; IV Status: Completed infusion; IV Intake:     al4 

      100ml                                                                                       

                                                                                                  

                                                                                                  

Disposition Summary:                                                                              

22 03:24                                                                                    

Hospitalization Ordered                                                                           

      Hospitalization Status: Inpatient Admission                                             [facility] 

      Provider: Roberto Hernandez                                                               M[facility]ail 

      Condition: Stable                                                                       [facility] 

      Problem: an ongoing problem                                                             [facility] 

      Symptoms: have improved                                                                 [facility] 

      Bed/Room Type: Standard                                                                 [facility] 

      Location: [facility] ER HOLD(22 03:41)                                                    

      Room Assignment: ERHOLD-(22 03:41)                                                  

      Diagnosis                                                                                   

        - UTI/ Urinary tract infection, site not specified                                    [facility] 

        - End-stage renal disease on hemodialysis                                             [facility] 

        - Hyperkalemia                                                                        [facility] 

      Forms:                                                                                      

        - Medication Reconciliation Form                                                      [facility] 

        - SBAR form                                                                           [facility] 

Signatures:                                                                                       

Dispatcher [employer]                           EDMS                                                 

Kylah Wynn RN                        RN                                                      

Andrea Presley MD MD   7                                                  

Analia Bauman                                5                                                  

Giovany Urban                            al4                                                  

                                                                                                  

Corrections: (The following items were deleted from the chart)                                    

02:17 01:49 Urine Culture+BA.LAB.BRZ ordered. [facility]

03:41 03:24 Telemetry/MedSurg (Inpatient) [facility]  

03:41 03:24 [facility]                                                                                 

04:17 04:11 Thyroid Stimulating Hormone ordered. EDMS                                         EDMS

                                                                                                  

**************************************************************************************************

## 2022-01-05 NOTE — P.HP
Certification for Inpatient


Patient admitted to: Observation


With expected LOS: <2 Midnights


Patient will require the following post-hospital care: None


Practitioner: I am a practitioner with admitting privileges, knowledge of 

patient current condition, hospital course, and medical plan of care.


Services: Services provided to patient in accordance with Admission requirements

found in Title 42 Section 412.3 of the Code of Federal Regulations





Patient History


Date of Service: 01/05/22


Reason for admission: Dysuria and missed dialysis


History of Present Illness: 





66-year-old female with history of chronic hypotension on midodrine, ESRD after 

prolonged acute kidney failure since the last 3 months, chronic anticoagulation 

with Eliquisunclear reason for anticoagulation, typically do dialysis on TTS 

schedule follows with Dr. Victor but missed the last 2 treatments due to new 

onset of chest congestion and cough.  Patient states she was afraid she might 

have Covid and did not want to go to dialysis.  She states she is not vaccinated

and does not want the vaccine.  She denies any fever.  She developed dysuria 

with flank pain since the last 2 days and had called her nephrologist and was 

asked to come to the emergency room.  On arrival in the emergency room a Covid 

screen was negative.  Chest x-ray shows mild pulmonary edema.  Potassium of 5.5.

 Beta elevated proBNP of 1152.  She is being admitted for UTI with missed 

dialysis.


Home medications list reviewed: Yes





- Past Medical/Surgical History


Diabetic: No


-: Chronic hypotension


-: Hyperlipidemia


-: Chronic anticoagulation use


-: History of nephrolithiasis requiring stent placement


-: Recurrent UTI


-: End-stage renal disease


Past Surgical History: Reviewed- Non-Contributory





- Family History


Family History: Reviewed- Non-Contributory





- Social History


Smoking Status: Never smoker


Smoking therapy provided: No


Alcohol use: No


CD- Drugs: No


Caffeine use: No


Place of Residence: Home





Review of Systems


10-point ROS is otherwise unremarkable





Physical Examination





- Physical Exam


General: Alert, In no apparent distress, Oriented x3, Obese


HEENT: Atraumatic, Normocephalic, PERRLA


Neck: Supple, 2+ carotid pulse no bruit, JVD not distended


Respiratory: Normal air movement, Diminished


Cardiovascular: Normal pulses, Regular rate/rhythm, Normal S1 S2


Capillary refill: <2 Seconds


Gastrointestinal: Normal bowel sounds, Soft and benign, Non-distended


Musculoskeletal: No clubbing, No swelling


Integumentary: No rashes, No breakdown


Neurological: Normal speech, Normal strength at 5/5 x4 extr, Normal tone


Urinary: Dialysis catheter (left permacath)





- Studies


Laboratory Data (last 24 hrs)





01/05/22 01:41: PT 14.5 H, INR 1.26


01/05/22 01:41: WBC 8.30, Hgb 7.4 L, Hct 24.1 L, Plt Count 112 L


01/05/22 01:41: Sodium 138, Potassium 5.5 H, BUN 58 H, Creatinine 6.61 H*, 

Glucose 112 H, Magnesium 2.5 H, Total Bilirubin 0.9, AST 24, ALT 32, Alkaline 

Phosphatase 220 H








Assessment and Plan





- Problems (Diagnosis)


(1) ESRD (end stage renal disease) on dialysis


Current Visit: Yes   Status: Acute   





(2) UTI (urinary tract infection)


Current Visit: Yes   Status: Acute   





(3) Hypotension arterial


Current Visit: Yes   Status: Acute   





- Plan


ESRD


Missed dialysis


UTI


Hyperkalemia


Chronic anticoagulation


Chronic hypotension


History of nephrolithiasiswith indwelling stent noted on CT imaging





Plan


Will admit patient to observation


We will plan for hemodialysis in a.m. nephrology consult


We will start empiric antibiotics with meropenem


Follow urine culture and base antibiotics on sensitivity given patient allergy 

profile


Continue midodrine


Continue Eliquis


Possible discharge in a.m. after dialysis





Discharge Plan: Home





- Advance Directives


Does patient have a Living Will: No


Does patient have a Durable POA for Healthcare: No





- Code Status/Comfort Care


Code Status: Full Code


Physician Review: Patient Assessed, Agree with Above Assessment and Plan


Time Spent Managing Pts Care (In Minutes): 65

## 2022-01-05 NOTE — P.DS
Admission Date: 01/05/22


Discharge Date: 01/05/22


Disposition: ROUTINE DISCHARGE


Discharge Condition: GOOD


Reason for Admission: Dysuria and missed dialysis


Consultations: 





Nephrology - Dr. Vergara





Procedures: 





Problem list


UTI


Volume overloaded secondary to missing dialysis


ESRD on HD


History of nephrolithiasis requiring stent placement


Chronic hypotension





Brief History of Present Illness: 


66-year-old female with history of chronic hypotension on midodrine, ESRD after 

prolonged acute kidney failure since the last 3 months, chronic anticoagulation 

with Eliquisunclear reason for anticoagulation, typically do dialysis on TTS 

schedule follows with Dr. Victor but missed the last 2 treatments due to new 

onset of chest congestion and cough.  Patient states she was afraid she might 

have Covid and did not want to go to dialysis.  She states she is not vaccinated

and does not want the vaccine.  She denies any fever.  She developed dysuria 

with flank pain since the last 2 days and had called her nephrologist and was 

asked to come to the emergency room.  On arrival in the emergency room a Covid 

screen was negative.  Chest x-ray shows mild pulmonary edema.  Potassium of 5.5.

 Beta elevated proBNP of 1152.  She is being admitted for UTI with missed 

dialysis.





Hospital Course: 


Patient underwent dialysis without complications.  She is feeling better, was 

not septic.  She is empirically treated with IV meropenem.


Nephrology was consulted and in agreement that patient was stable and 

appropriate for discharge home.  She is discharged home with single strength 

Bactrim for UTI.  Nephrology stated they will follow up on urine culture and 

inform the patient if anything needs to be changed.


Patient to resume dialysis as previously scheduled.





Vital Signs/Physical Exam: 














Temp Pulse Resp BP Pulse Ox


 


    79      107/58 L   


 


    01/05/22 04:08     01/05/22 04:08   








General: Alert, In no apparent distress


HEENT: Sclerae nonicteric


Respiratory: Diminished, Other (Nonlabored on room air)


Cardiovascular: Regular rate/rhythm, Edema (1+)


Gastrointestinal: Soft and benign, Non-distended, No tenderness


Musculoskeletal: No tenderness


Integumentary: No rashes


Neurological: Normal speech, Normal affect


Laboratory Data at Discharge: 














WBC  8.30 K/uL (4.3-10.9)   01/05/22  01:41    


 


Hgb  7.4 g/dL (12.0-15.0)  L  01/05/22  01:41    


 


Hct  24.1 % (36.0-45.0)  L  01/05/22  01:41    


 


Plt Count  112 K/uL (152-406)  L  01/05/22  01:41    


 


PT  14.5 SECONDS (9.5-12.5)  H  01/05/22  01:41    


 


INR  1.26   01/05/22  01:41    


 


Sodium  138 mmol/L (136-145)   01/05/22  01:41    


 


Potassium  5.5 mmol/L (3.5-5.1)  H  01/05/22  01:41    


 


BUN  58 mg/dL (7-18)  H  01/05/22  01:41    


 


Creatinine  6.61 mg/dL (0.55-1.3)  H*  01/05/22  01:41    


 


Glucose  112 mg/dL ()  H  01/05/22  01:41    


 


Magnesium  2.5 mg/dL (1.8-2.4)  H  01/05/22  01:41    


 


Total Bilirubin  0.9 mg/dL (0.2-1.0)   01/05/22  01:41    


 


AST  24 U/L (15-37)   01/05/22  01:41    


 


ALT  32 U/L (12-78)   01/05/22  01:41    


 


Alkaline Phosphatase  220 U/L ()  H  01/05/22  01:41    








Home Medications: 








Sulfamethoxazole/Trimethoprim [Bactrim 400-80 mg Tablet] 1 each PO BID 5 Days 

#10 tablet 01/05/22 





New Medications: 


Sulfamethoxazole/Trimethoprim [Bactrim 400-80 mg Tablet] 1 each PO BID 5 Days 

#10 tablet


Physician Discharge Instructions: 


You were volume overloaded due to missing dialysis. Improved with dialysis. 

Please continue as normally scheduled.


You were also found to have a UTI. This was discussed with nephrology. You 

received a dose of IV meropenem in the hospital and are prescribed bactrim on 

discharge


Your nephrologist will follow up on the urine culture and call you if the 

antibiotic needs to be changed.





Diet: Renal


Activity: Ad susan


Followup: 


Charisse Green MD [Primary Care Provider] - 


Time spent managing pt's care (in minutes): 45

## 2022-01-05 NOTE — CON
Date of Consultation:  01/05/2022



Reason For Consultation:  Elevated BUN and creatinine, resuming hemodialysis.



History Of Present Illness:  This is a pleasant 66-year-old female with significant past medical hist
ory of hyperlipidemia, cardiac arrhythmia on anticoagulation, nephrolithiasis with recurrent UTI stat
us post stenting, end-stage renal disease secondary to toxic ATN, recently started on dialysis few mo
nths ago as acute kidney injury.  The patient maintained on dialysis.  Apparently, the patient felt s
ick with nausea and vomiting, abdominal pain, suspected herself to have COVID but did not follow inst
ructions.  The patient did not show up to dialysis for the last 2 sessions because she was afraid of 
contamination to other patients and/or picking it up from patient who is infected.  The patient start
ed feeling sick and for that reason reported to the hospital.  In the hospital, found to be over volu
me with elevation in BUN and creatinine and hyperkalemia.  For that reason, we have been consulted.  
As I mentioned, the patient missed the last 2 sessions of dialysis.



Past Medical History:  Includes,

1.Nephrolithiasis with recurrent UTI, status post stenting.  Recurrent treatment as outpatient for U
TI.

2.Hypertension.

3.Hyperlipidemia.

4.End-stage renal disease, dialysis dependent.



Past Surgical History:  Include urethral stent placement.



Family History:  Positive for hypertension.



Social History:  Lives alone.  Denied smoking.  Denied drinking.  Denied drugs abuse.



Allergies:  CEFEPIME, LEVAQUIN, AND PENICILLIN.



Review of Systems:

Head and Neck:  No red eye.  No ear pain. 

GI:  Has abdominal pain.  Has flank pain. 

:  Has flank pain.  Has dysuria. 

Gyn:  No vaginal discharge. 

Respiratory:  Has shortness of breath. 

Cardiovascular:  No chest pain. 

Endocrine:  No polydipsia. 

Skin:  No rash. 

Neuro:  Has neuropathy. 

Musculoskeletal:  No joint pain.



Physical Examination:

Vital Signs:  When I saw the patient; blood pressure 162/78, pulse of 88. 

Chest:  Crackles bilateral. 

Heart:  S1, S2.  Systolic murmur. 

Abdomen:  Soft, nontender.  No guarding or rebound.  Only minimal tenderness on the left flank. 

Extremities:  +1 edema. 

Neurologic:  Alert and oriented x3.  Nonfocal.



Laboratory Data:  WBC 8.3, H and H 7.4/24.1.  Sodium 138, potassium 5.5, bicarb 23, BUN 58, creatinin
e 6.6, calcium 8.7.  BNP 11,841, albumin 2.6, corrected calcium is 10.  TSH 2.7.



Current Medications:  The patient is on include meropenem, hydralazine, Lasix 40, Pepcid, morphine, g
uaifenesin.



Assessment And Plan:  

1.End-stage renal disease, over volume with hyperkalemia, missed dialysis.  I am going to arrange fo
r the patient for dialysis today.  We will challenge the patient to establish better volume control. 
 We will dialyze the patient on low-potassium bath and we will follow up.

2.Hypertension.  We will utilize blood pressure for more ultrafiltration.  We will follow up blood p
ressure after dialysis.

3.Urinary tract infection, recurrent with history of urosepsis and renal stenting.  We will follow u
p CT result.  We will obtain culture of the urine and the blood.  Currently, the patient is going to 
be started on antibiotic.  We will follow up the culture as outpatient.  We will start the patient on
 Bactrim for the time being as the patient is allergic to penicillin and cephalosporin and quinolones
.

4.Hyperkalemia.  The patient is going to be dialyzed on low-potassium bath.

5.Over volume.  The patient is going to be challenged.

6.Diabetes, as by primary.





MA/JUAREZ

DD:  01/05/2022 10:07:58Voice ID:  [id number]

DT:  01/05/2022 10:37:51Report ID:  [id number]

## 2022-01-05 NOTE — ER
Nurse's Notes                                                                                     

 [facility]                                                                 

Name: Juli Rushing                                                                             

Age: 66 yrs                                                                                       

Sex: Female                                                                                       

: 1955                                                                                   

MRN: [id number]                                                                                   

Arrival Date: 2022                                                                          

Time: 23:08                                                                                       

Account#: [id number]                                                                            

Bed 27                                                                                            

Private MD:                                                                                       

Diagnosis: UTI/ Urinary tract infection, site not specified;End-stage renal disease on            

  hemodialysis;Hyperkalemia                                                                       

                                                                                                  

Presentation:                                                                                     

                                                                                             

23:46 Chief complaint: Patient states: "I was here Friday and they did a Covid test, I had    tw5 

      been vomiting. For the last several days I just feel lousy and my lower back hurts. I       

      just feel I have a urinary tract infection.". Coronavirus screen: Vaccine status:           

      Patient reports being unvaccinated. Ebola Screen: Patient negative for fever greater        

      than or equal to 101.5 degrees Fahrenheit, and additional compatible Ebola Virus            

      Disease symptoms Patient denies exposure to infectious person. Patient denies travel to     

      an Ebola-affected area in the 21 days before illness onset. Initial Sepsis Screen: Does     

      the patient meet any 2 criteria? Mean Arterial Pressure (MAP) < 65. Does the patient        

      have a suspected source of infection? Yes: Dysuria/Frequency/Urgency/UTI. Risk              

      Assessment: Do you want to hurt yourself or someone else? Patient reports no desire to      

      harm self or others. Onset of symptoms is unknown.                                          

23:46 Method Of Arrival: Wheelchair                                                           tw5 

23:46 Acuity: MIRTHA 3                                                                           tw5 

                                                                                                  

Triage Assessment:                                                                                

23:48 General: Appears in no apparent distress. uncomfortable, Behavior is calm, cooperative, tw5 

      appropriate for age. Pain: Complains of pain in low back area Pain currently is 3 out       

      of 10 on a pain scale.                                                                      

                                                                                                  

Historical:                                                                                       

- Allergies:                                                                                      

23:48 PENICILLINS;                                                                            tw5 

                                                                                             

02:36 cefepime;                                                                               al4 

02:36 QUINOLONES;                                                                             al4 

- Home Meds:                                                                                      

                                                                                             

23:48 midodrine 5 mg oral tab 2 tabs 3 times per day [Active]; sevelamer carbonate 800 mg     tw5 

      oral tab 2 tabs 3 times per day [Active]; carvedilol 3.125 mg oral tab 1 tab 2 times        

      per day [Active]; Eliquis 5 mg oral tab 1 tab 2 times per day [Active]; allopurinol 100     

      mg Oral tab 1 tab once daily [Active]; bumetanide 2 mg Oral tab 1 tab once daily            

      [Active]; digoxin 125 mcg (0.125 mg) Oral tab 1 tab once daily [Active]; atorvastatin       

      10 mg oral tab 1 tab once daily [Active]; gabapentin 300 mg oral cap 1 cap BID              

      [Active]; Colace 100 mg oral cap 1 cap once daily [Active]; Osteo Bi-Flex Triple            

      Strength 750 mg-644 mg- 30 mg-1 mg oral tab [Active]; tramadol 100 mg Oral Tb24 1 tab       

      once daily [Active];                                                                        

- PMHx:                                                                                           

23:48 Dialysis; High Cholesterol; Hypertensive disorder; stage 4 kidney failure;              tw5 

- PSHx:                                                                                           

23:48 None;                                                                                   tw 

                                                                                                  

- Immunization history:: Flu vaccine is not up to date.                                           

- Social history:: Smoking status: Patient denies any tobacco usage or history of.                

                                                                                                  

                                                                                                  

Screenin:54 Abuse screen: Denies threats or abuse. Denies injuries from another. Abuse screen:      tw5 

      Denies threats or abuse. Nutritional screening: No deficits noted. Tuberculosis             

      screening: No symptoms or risk factors identified. Fall Risk Fall in past 12 months (25     

      points). IV access (20 points). Ambulatory Aid- None/Bed Rest/Nurse Assist (0 pts).         

      Gait- Normal/Bed Rest/Wheelchair (0 pts) Mental Status- Oriented to own ability (0          

      pts). Total Gates Fall Scale indicates High Risk Score (45 or more points). Fall            

      prevention measures have been instituted. Side Rails Up X 2 Placed Close to Nursing         

      Station Frequent Obs/Assessments Occuring As available patient and family educated on       

      Fall Prevention Program and Strategies.                                                     

                                                                                                  

Assessment:                                                                                       

23:53 General: Patient states that she has missed two sessions of dialysis .                                                                                                               

01:00 General: Appears in no apparent distress. comfortable, Behavior is calm, cooperative,   al4 

      appropriate for age, patient states "my doctor sent me here because I had blood work        

      this morning and missed my dialysis today and last Saturday because I wasn't feeling        

      well." patient states she just "generally was not feeling good." patient states " I was     

      vomiting Wednesday and Thursday so I came in Friday to get covid tested.". Pain:            

      Complains of pain in back Pain currently is 4 out of 10 on a pain scale. Neuro: Level       

      of Consciousness is awake, alert, obeys commands, Oriented to person, place, time.          

      Cardiovascular: Heart tones present Capillary refill < 3 seconds Patient's skin is warm     

      and dry. Respiratory: Airway is patent Respiratory effort is even, unlabored,               

      Respiratory pattern is regular, symmetrical, Breath sounds are clear. GI: Abdomen is        

      tender to palpation RUQ and LLQ. : Reports burning with urination, "pelvic pain on        

      both sides". EENT: No signs and/or symptoms were reported regarding the EENT system.        

      Derm: No signs and/or symptoms reported regarding the dermatologic system.                  

      Musculoskeletal: Swelling present in right leg and left leg patient states leg swelling     

      is secondary to her kidney failure.                                                         

01:49 Reassessment: urine dipstick on hold because machine is down .                          al4 

02:27 Reassessment: patient states she is allergic to Levaquin, cefepime, and penicillin -    al4 

      physician aware. Levaquin not given.                                                        

04:00 Reassessment: Patient is alert, oriented x 3, equal unlabored respirations, skin        al4 

      warm/dry/pink. patient ambulated to restroom with assistance. physician updated her on      

      POC.                                                                                        

04:43 Reassessment: Patient is alert, oriented x 3, equal unlabored respirations, skin        al4 

      warm/dry/pink.                                                                              

06:00 Reassessment: patient moved to room 27. report given to ELPIDIO mendoza.                     al4 

06:36 Reassessment: patient educated on meropenem, and stated she does not have any allergies al4 

      or reactions to this med. she gave consent to give.                                         

                                                                                                  

Vital Signs:                                                                                      

                                                                                             

23:45 BP 86 / 51; Pulse 65; Resp 22; Temp 97.6(TE); Pulse Ox 96% on 2 lpm NC; Weight 106.59   tw5 

      kg; Height 5 ft. 7 in. (170.18 cm); Pain 2/10;                                              

                                                                                             

00:00 BP 93 / 62; Pulse 76; Resp 20 S; Pulse Ox 100% on R/A;                                  al4 

00:30  / 47; Pulse 71; Resp 20 S; Pulse Ox 100% on R/A;                                 al4 

01:00 BP 79 / 57; Pulse 71; Resp 20 S; Pulse Ox 100% on R/A;                                  al4 

01:45  / 66; Pulse 66; Resp 20 S; Pulse Ox 100% on R/A;                                 al4 

02:30 BP 96 / 62; Pulse 73; Resp 20; Pulse Ox 100% ;                                          al4 

04:01  / 71; Pulse 73; Resp 20; Pulse Ox 100% ;                                         al4 

05:15  / 73; Pulse 100; Resp 20; Pulse Ox 99% ;                                         al4 

                                                                                             

23:45 Body Mass Index 36.81 (106.59 kg, 170.18 cm)                                            tw5 

                                                                                                  

ED Course:                                                                                        

                                                                                             

23:08 Patient arrived in ED.                                                                  ag3 

23:48 Triage completed.                                                                       tw5 

23:53 Arm band placed on right wrist.                                                         tw5 

23:58 John Akhtar, ELPIDIO is Primary Nurse.                                                    as6 

                                                                                             

00:40 Andrea Presley MD is Attending Physician.                                             mh7 

01:18 CT Stone Protocol In Process Unspecified.                                               EDMS

01:44 Inserted saline lock: 20 gauge in right antecubital area, using aseptic technique.      al4 

      ,using aseptic technique. done by Sil Sal RN Blood collected.                         

01:45 COVID-19/FLU A+B (Document "Date of Onset" if Symptomatic) Sent.                        al4 

01:57 Urine Culture Sent.                                                                     al4 

02:05 Patient has correct armband on for positive identification. Placed in gown. Bed in low  al4 

      position. Call light in reach. Side rails up X 1. Pulse ox on. NIBP on.                     

02:27 COVID-19/FLU A+B (Document "Date of Onset" if Symptomatic) Sent.                        al4 

02:53 XRAY Chest (1 view) In Process Unspecified.                                             EDMS

03:23 Roberto Hernandez MD is Hospitalizing Provider.                                          mh7 

03:35 Blood Culture Adult (2) Sent.                                                           as6 

06:38 No provider procedures requiring assistance completed.                                  al4 

06:39 Patient admitted, IV remains in place.                                                  al4 

                                                                                                  

Administered Medications:                                                                         

02:27 Not Given (Physician Discretion): LevaQUIN (levofloxacin) 500 mg 100 ml IVPB once over  al4 

      60 mins                                                                                     

04:00 Drug: Azactam 1 grams Route: IVPB; Infused Over: 30 mins; Site: right antecubital;      al4 

04:32 Follow up: Response: No adverse reaction; IV Status: Completed infusion; IV Intake:     al4 

      100ml                                                                                       

                                                                                                  

                                                                                                  

Intake:                                                                                           

04:32 IV: 100ml; Total: 100ml.                                                                al4 

                                                                                                  

Outcome:                                                                                          

03:24 Decision to Hospitalize by Provider.                                                    mh7 

06:38 Admitted to ER Hold.  Please see [facility] for further documentation.                    al4 

06:38 Condition: stable                                                                           

06:38 Instructed on the need for admit.                                                           

21:43 Patient left the ED.                                                                    wayne  

                                                                                                  

Signatures:                                                                                       

Dispatcher [employer]                           EDMS                                                 

Jacey Beckham RN                     RN   Amisha Maynard                                 3                                                  

Andrea Presley MD MD   7                                                  

Analia Bauman                                5                                                  

John Akhtar RN RN   as6                                                  

Giovany Urban                            al4                                                  

                                                                                                  

Corrections: (The following items were deleted from the chart)                                    

02:17 01:57 Urine Culture+BA.LAB.BRZ drawn and sent. al4                                      EDMS

06:38  23:54 Fall Risk Fall in past 12 months (25 points). tw5                           al4 

                                                                                             

06:39 06:38 Admitted to ER Hold. Please see [facility] [facility] for further documentation. al4           al4 

06:40 06:38 Inserted saline lock: 20 gauge in right antecubital area, using aseptic           al4 

      technique. ,using aseptic technique. done by Sil Sal RN Blood collected. al4          

                                                                                                  

**************************************************************************************************

## 2022-01-05 NOTE — RAD REPORT
EXAM DESCRIPTION:  RAD - Chest Single View - 1/5/2022 2:54 am

 

CLINICAL HISTORY:  66 years, Female, SOB

 

COMPARISON:  None

 

FINDINGS:  Single view of the chest was obtained portable. No prior films are available for compariso
n.   The lung volume is decreased. The heart is enlarged. Thoracic aorta is unremarkable. Left IJ sisi
luis alberto dialysis catheter. External EKG leads within the field-of-view limits diagnosis.. Elevation right
 hemidiaphragm with compressive atelectatic changes/or infiltrate. Pulmonary vasculature is normal di
stribution. The rest of the soft tissue and bony structures demonstrate to be unremarkable.

 

IMPRESSION:  Elevation right hemidiaphragm with compressive atelectatic changes and/or infiltrate.

Cardiomegaly.

No focal areas of acute airspace disease.

 

Electronically signed by:   Danny Delgado MD   1/5/2022 3:30 AM CST Workstation: [id number]

 

 

Due to temporary technical issues with the PACS/Fluency reporting system, reports are being signed by
 the in house radiologist without review as a courtesy to ensure prompt reporting. The interpreting r
adiologist is fully responsible for the content of the report.

## 2022-01-12 ENCOUNTER — HOSPITAL ENCOUNTER (EMERGENCY)
Dept: HOSPITAL 97 - ER | Age: 67
Discharge: HOME | End: 2022-01-12
Payer: COMMERCIAL

## 2022-01-12 VITALS — DIASTOLIC BLOOD PRESSURE: 49 MMHG | OXYGEN SATURATION: 100 % | TEMPERATURE: 97.9 F | SYSTOLIC BLOOD PRESSURE: 104 MMHG

## 2022-01-12 DIAGNOSIS — I48.91: ICD-10-CM

## 2022-01-12 DIAGNOSIS — I50.20: Primary | ICD-10-CM

## 2022-01-12 DIAGNOSIS — N39.0: ICD-10-CM

## 2022-01-12 DIAGNOSIS — I12.9: ICD-10-CM

## 2022-01-12 DIAGNOSIS — Z99.2: ICD-10-CM

## 2022-01-12 DIAGNOSIS — Z79.01: ICD-10-CM

## 2022-01-12 DIAGNOSIS — N18.4: ICD-10-CM

## 2022-01-12 LAB
ALBUMIN SERPL BCP-MCNC: 3.2 G/DL (ref 3.4–5)
ALP SERPL-CCNC: 225 U/L (ref 45–117)
ALT SERPL W P-5'-P-CCNC: 17 U/L (ref 12–78)
AST SERPL W P-5'-P-CCNC: 8 U/L (ref 15–37)
BUN BLD-MCNC: 32 MG/DL (ref 7–18)
GLUCOSE SERPLBLD-MCNC: 83 MG/DL (ref 74–106)
HCT VFR BLD CALC: 26.5 % (ref 36–45)
INR BLD: 1.21
LYMPHOCYTES # SPEC AUTO: 1.8 K/UL (ref 0.7–4.9)
MAGNESIUM SERPL-MCNC: 2.4 MG/DL (ref 1.8–2.4)
PMV BLD: 7.9 FL (ref 7.6–11.3)
POTASSIUM SERPL-SCNC: 4.7 MMOL/L (ref 3.5–5.1)
RBC # BLD: 2.6 M/UL (ref 3.86–4.86)
TROPONIN I SERPL HS-MCNC: 17 PG/ML (ref ?–58.9)
URINE UROTHELIAL CELLS: <5 /HPF

## 2022-01-12 PROCEDURE — 85610 PROTHROMBIN TIME: CPT

## 2022-01-12 PROCEDURE — 87186 SC STD MICRODIL/AGAR DIL: CPT

## 2022-01-12 PROCEDURE — 71045 X-RAY EXAM CHEST 1 VIEW: CPT

## 2022-01-12 PROCEDURE — 36415 COLL VENOUS BLD VENIPUNCTURE: CPT

## 2022-01-12 PROCEDURE — 84484 ASSAY OF TROPONIN QUANT: CPT

## 2022-01-12 PROCEDURE — 87077 CULTURE AEROBIC IDENTIFY: CPT

## 2022-01-12 PROCEDURE — 81001 URINALYSIS AUTO W/SCOPE: CPT

## 2022-01-12 PROCEDURE — 96374 THER/PROPH/DIAG INJ IV PUSH: CPT

## 2022-01-12 PROCEDURE — 80048 BASIC METABOLIC PNL TOTAL CA: CPT

## 2022-01-12 PROCEDURE — 80076 HEPATIC FUNCTION PANEL: CPT

## 2022-01-12 PROCEDURE — 83880 ASSAY OF NATRIURETIC PEPTIDE: CPT

## 2022-01-12 PROCEDURE — 83735 ASSAY OF MAGNESIUM: CPT

## 2022-01-12 PROCEDURE — 99283 EMERGENCY DEPT VISIT LOW MDM: CPT

## 2022-01-12 PROCEDURE — 93005 ELECTROCARDIOGRAM TRACING: CPT

## 2022-01-12 PROCEDURE — 85025 COMPLETE CBC W/AUTO DIFF WBC: CPT

## 2022-01-12 PROCEDURE — 87088 URINE BACTERIA CULTURE: CPT

## 2022-01-12 PROCEDURE — 87086 URINE CULTURE/COLONY COUNT: CPT

## 2022-01-12 NOTE — XMS REPORT
Continuity of Care Document

                           Created on:2022



Patient:SUZI SEARS

Sex:Female

:1955

External Reference #:[id number]





Demographics







                          Address                   [address] [place] [address] [address]



                                                    [place] [address]

 

                          Home Phone                [phone]

 

                          Work Phone                [phone]

 

                          Email Address             [email or web address]

 

                          Preferred Language        English

 

                          Marital Status            Unknown

 

                          Latter day Affiliation     Unknown

 

                          Race                      Unknown

 

                          Additional Race(s)        Unavailable

 

                          Ethnic Group              Unknown









Author







                          Organization              [facility]

t

 

                          Address                   [address]



                                                    [place] [address]

 

                          Phone                     [phone]









Support







                Name            Relationship    Address         Phone

 

                EDUARDO SEARS      FAUSTO              [address] [phone]



                                                [place] [address] 

 

                EDUARDO SEARS      FAUSTO              Unavailable     [phone]









Care Team Providers







                    Name                Role                Phone

 

                    AHMED               Attending Clinician Unavailable

 

                    ANABEL              Attending Clinician Unavailable

 

                    TONO           Attending Clinician Unavailable

 

                    MD ALIYA LAMAR Attending Clinician Unavailable

 

                    LAKHWINDER           Attending Clinician Unavailable

 

                    RADHA              Attending Clinician Unavailable

 

                    ANAIS          Attending Clinician Unavailable

 

                    MD ANAIS      Attending Clinician Unavailable

 

                    MD ANABEL  OBIOMA  Attending Clinician Unavailable

 

                    MD LATISHA POWER Attending Clinician Unavailable

 

                    DANIELLA               Attending Clinician Unavailable

 

                    CHAD              Attending Clinician Unavailable

 

                    DR KEIKO            Attending Clinician Unavailable

 

                    TONO           Admitting Clinician Unavailable

 

                    MD ALIYA LAMAR Admitting Clinician Unavailable

 

                    ANAIS          Admitting Clinician Unavailable

 

                    MD ANAIS      Admitting Clinician Unavailable

 

                    ANABEL              Admitting Clinician Unavailable

 

                    MD JOSEY BECKHAM        Admitting Clinician Unavailable

 

                    MD LATISHA POWER Admitting Clinician Unavailable

 

                    DR KEIKO            Admitting Clinician Unavailable









Problems







       Condition Condition Condition Status Onset  Resolution Last   Treating Co

mments 

Source



       Name   Details Category        Date   Date   Treatment Clinician        



                                                 Date                 

 

       SOLO           Diagnosis Active 2021               Mem

oria



                                             12:03:00               l



                SOLO                00:00:                             Barron



                                   00                                 



                                                                      



              Active                                                  



                                                                      



                                                                      



              2021                                                  



                                                                      



                                                                      



                                                                      



                                                                      



                                                                      



                                                                      



                                                                      



                                                                      



                                                                       



              [facility]                                                  



                                                                      



                                                                      

 

       COLON         Diagnosis Active 2017               Mem

oria



       CANCER                                05:49:00               l



       SCREENING-   COLON               00:00:                             M[facility]

nn



       Z12.11 CANCER               00                                 



              SCREENING-                                                  



              Z12.11                                                  



                                                                      



                                                                      



              Active                                                  



                                                                      



                                                                      



              2017                                                  



                                                                      



                                                                      



                                                                      



                                                                      



                                                                      



                                                                      



                                                                      



                                                                      



                                                                       



              [place]                                                  



                                                                      



                                                                      

 

       R92.1 -        Diagnosis Active 2016               Me

moria



       MAMMOGRAPH                                15:55:00               l



       IC       R92.1 -               00:01:                             [facility]ron



       CALCIFCN MAMMOGRAPH               00                                 



       FOUND ON IC                                                      



              CALCIFCN                                                  



              FOUND ON                                                  



                                                                      



                                                                      



              Active                                                  



                                                                      



                                                                      



              2016                                                  



                                                                      



                                                                      



                                                                      



                                                                      



                                                                      



                                                                      



                                                                      



                                                                      



                                                                       



              [facility]                                                  



              [facility]                                                    



                                                                      



                                                                      

 

       Z12.31 -        Diagnosis Active 2016               M

emoria



       ENCNTR                      -          07:08:00               l



       SCREEN   Z12.31 -               00:01:                             Donny martinez



       MAMMOGRAM ENCNTR               00                                 



       FOR MA SCREEN                                                  



              MAMMOGRAM                                                  



              FOR MA                                                  



                                                                      



                                                                      



              Active                                                  



                                                                      



                                                                      



              2016                                                  



                                                                      



                                                                      



                                                                      



                                                                      



                                                                      



                                                                      



                                                                      



                                                                      



                                                                       



              [facility]                                                  



              [facility]                                                    



                                                                      



                                                                      

 

       RT WRIST        Diagnosis Active 2017               M

emoria



       DISTAL                      1-          02:03:00               l



       RADIUS   RT WRIST               09:00:                             Donny martinez



       CLSD FX DISTAL               00                                 



              RADIUS                                                  



              CLSD FX                                                  



                                                                      



                                                                      



              Active                                                  



                                                                      



                                                                      



              2016                                                  



                                                                      



                                                                      



                                                                      



                                                                      



                                                                      



                                                                      



                                                                      



                                                                      



                     [facility]                                                  



              [facility]                                                  



              [facility]                                                  



              [facility]                                                   



                                                                      



                                                                      

 

       Abnormal        Problem Active 2021               Mem

oria



       glucose                      7-02          22:17:41               l



       level    Abnormal               00:00:                             Donny martinez



       (finding) glucose               00                                 



              level                                                   



              (finding)                                                  



                                                                      



                                                                      



              Active                                                  



                                                                      



                                                                      



              2015                                                  



                                                                      



                                                                      



               Problem                                                  



                                                                      



                                                                      



              2021                                                  



                                                                      



                                                                      



               Data                                                   



              migrated                                                  



              from [employer]                                                  



              on 7/8/15.                                                  



                                                                      



                                                                      



                                                                    



              Medical                                                  



              Group,                                                  



              [facility],                                                  



              [facility]                                                  



              [facility],                                                  



              [facility]                                                     



              [facility]                                                  



              [facility]                                                  



              [facility]                                                  



              [facility]                                                  



              [facility],                                                  



              [place]                                                  



                                                                      



                                                                      

 

       Lymphedema        Problem Active 2020               M

emoria



       (disorder)                      2-          00:38:51               l



                                   00:00:                             [facility]ron



              Lymphedema               00                                 



              (disorder)                                                  



                                                                      



                                                                      



               Active                                                  



                                                                      



                                                                      



              2014                                                  



                                                                      



                                                                      



               Problem                                                  



                                                                      



                                                                      



              2020                                                  



                                                                      



                                                                      



               Data                                                   



              migrated                                                  



              from [employer]                                                  



              on                                                      



              5/30/15.                                                  



                                                                      



                                                                      



               Medical                                                  



              Group,                                                  



              [facility],                                                  



              [facility]                                                  



              [facility],                                                  



              [facility]                                                     



              [facility]                                                  



              [facility]                                                  



              [facility]                                                  



              [facility]                                                  



              [facility],                                                  



              [place]                                                  



                                                                      



                                                                      

 

       Obstructiv        Problem Active 2021               M

emoria



       e sleep                      2-04          22:17:41               l



       apnea                       00:00:                             [place]



       syndrome Obstructiv               00                                 



       (disorder) e sleep                                                  



              apnea                                                   



              syndrome                                                  



              (disorder)                                                  



                                                                      



                                                                      



               Active                                                  



                                                                      



                                                                      



              2014                                                  



                                                                      



                                                                      



               Problem                                                  



                                                                      



                                                                      



              2021                                                  



                                                                      



                                                                      



               Data                                                   



              migrated                                                  



              from [employer]                                                  



              on                                                      



              5/30/15.                                                  



                                                                      



                                                                      



               Medical                                                  



              Group,                                                  



              [facility],                                                  



              [facility]                                                  



              [facility],                                                  



              [facility]                                                     



              [facility]                                                  



              [facility]                                                  



              [facility]                                                  



              [facility]                                                  



              [facility],                                                  



              [place]                                                  



                                                                      



                                                                      

 

       Hypothyroi        Problem Active 2013-0        2019-10-19               M

em[facility]



       dism                                  21:25:36               l



       (disorder)                      00:00:                             Donny

n



              Hypothyroi               00                                 



              dism                                                    



              (disorder)                                                  



                                                                      



                                                                      



               Active                                                  



                                                                      



                                                                      



              2013                                                  



                                                                      



                                                                      



               Problem                                                  



                                                                      



                                                                      



              10/19/2019                                                  



                                                                      



                                                                      



               Data                                                   



              migrated                                                  



              from [employer]                                                  



              [employer]                                                  



              on                                                      



              5/30/15.                                                  



                                                                      



                                                                      



               Medical                                                  



              Group,                                                  



              [facility]                                                    



              [facility],                                                  



              [facility]                                                  



              [facility],                                                  



              [facility]                                                     



              [facility]                                                  



              [facility]                                                  



              [place]                                                  



              [facility]                                                  



              [facility],                                                  



              [place]                                                  



                                                                      



                                                                      

 

       Depressive        Problem Active 2021               [facility]

[facility]



       disorder                      4-24          22:17:41               l



       (disorder)                      00:00:                             Donny

n



              Depressive               00                                 



              disorder                                                  



              (disorder)                                                  



                                                                      



                                                                      



               Active                                                  



                                                                      



                                                                      



              2013                                                  



                                                                      



                                                                      



               Problem                                                  



                                                                      



                                                                      



              2021                                                  



                                                                      



                                                                      



               Data                                                   



              migrated                                                  



              from [employer]                                                  



              [employer]                                                  



              on                                                      



              5/30/15.                                                  



                                                                      



                                                                      



               Medical                                                  



              Group,                                                  



              [facility]                                                    



              [facility],                                                  



              [facility]                                                  



              [facility],                                                  



              [facility]                                                     



              [facility]                                                  



              [facility]                                                  



              [place]                                                  



              [facility]                                                  



              [facility],                                                  



              [place]                                                  



                                                                      



                                                                      

 

       Obesity        Problem Active 2016               [place]

[facility]



       (disorder)                      8-20          00:23:22               l



                Obesity               00:00:                             [place]



              (disorder)               00                                 



                                                                      



                                                                      



               Active                                                  



                                                                      



                                                                      



              2012                                                  



                                                                      



                                                                      



               Problem                                                  



                                                                      



                                                                      



              2016                                                  



                                                                      



                                                                      



               Data                                                   



              migrated                                                  



              from [employer]                                                  



              [employer]                                                  



              on                                                      



              5/30/15.                                                  



                                                                      



                                                                      



               [facility]                                                  



              [facility],                                                  



              [facility]                                                  



              [facility],                                                  



              [facility]                                                     



              [facility]                                                  



              [facility]                                                  



              [place]                                                  



              [facility]                                                  



              [facility]                                                   



                                                                      



                                                                      

 

       Cobalamin        Problem Active 2021               [facility]

[facility]



       deficiency                      7-11          22:17:41               l



       (disorder)                      00:00:                             Donny

n



              Cobalamin               00                                 



              deficiency                                                  



              (disorder)                                                  



                                                                      



                                                                      



               Active                                                  



                                                                      



                                                                      



              2012                                                  



                                                                      



                                                                      



               Problem                                                  



                                                                      



                                                                      



              2021                                                  



                                                                      



                                                                      



               Data                                                   



              migrated                                                  



              from [employer]                                                  



              [employer]                                                  



              on                                                      



              5/30/15.                                                  



                                                                      



                                                                      



               Medical                                                  



              Group,                                                  



              [facility]                                                    



              [facility],                                                  



              [facility]                                                  



              [facility],                                                  



              [facility]                                                     



              [facility]                                                  



              [facility]                                                  



              [place]                                                  



              [facility]                                                  



              [facility],                                                  



              [place]                                                  



                                                                      



                                                                      

 

       Hypertensi        Problem Active 2016               [facility]

[facility]



       ve episode                      7-10          00:23:22               l



       (disorder)                      00:00:                             Donny

n



              Hypertensi               00                                 



              ve episode                                                  



              (disorder)                                                  



                                                                      



                                                                      



               Active                                                  



                                                                      



                                                                      



              07/10/2012                                                  



                                                                      



                                                                      



               Problem                                                  



                                                                      



                                                                      



              2016                                                  



                                                                      



                                                                      



               Data                                                   



              migrated                                                  



              from [employer]                                                  



              [employer]                                                  



              on                                                      



              5/30/15.                                                  



                                                                      



                                                                      



              [facility]                                                  



              [facility],                                                  



              [facility]                                                  



              [facility],                                                  



              [facility]                                                     



              [facility]                                                  



              [facility]                                                  



              [place]                                                  



              [facility]                                                  



              [facility]                                                   



                                                                      



                                                                      

 

       Pain in        Problem                      2016               [place]

[facility]



       wrist                                     05:02:18               l



       (finding)   Pain in                                                  [facility]

daryl



              wrist                                                   



              (finding)                                                  



                                                                      



                                                                      



                                                                      



                                                                      



                                                                      



                                                                      



                                                                      



              Problem                                                  



                                                                      



                                                                      



              2016                                                  



                                                                      



                                                                      



                                                                      



                                                                      



                                                                      



              [facility] [place]                                                  



              [facility]                                                    



                                                                      



                                                                      

 

       Calcificat        Problem Resolve               2021               

[facility]



       ion of               d                    22:17:41               l



       breast                                                         [place]



       (finding) Calcificat                                                  



              ion of                                                  



              breast                                                  



              (finding)                                                  



                                                                      



                                                                      



              Resolved                                                  



                                                                      



                                                                      



                                                                      



                                                                      



               Problem                                                  



                                                                      



                                                                      



              2021                                                  



                                                                      



                                                                      



               Left                                                   



                                                                      



                                                                       



              Medical                                                  



              Group,                                                  



              [facility]                                                    



              [facility],                                                  



              [facility]                                                  



              [facility],                                                  



              [facility]                                                     



              [facility]                                                  



              [facility]                                                  



              [place]                                                  



              [facility]                                                  



              [facility],                                                  



              [place]                                                  



                                                                      



                                                                      

 

       Dysuria        Problem Resolve               2021               [facility]

[facility]



       (finding)               d                    22:17:41               l



                Dysuria                                                  [place]



              (finding)                                                  



                                                                      



                                                                      



              Resolved                                                  



                                                                      



                                                                      



                                                                      



                                                                      



               Problem                                                  



                                                                      



                                                                      



              2021                                                  



                                                                      



                                                                      



                                                                      



                                                                      



                                                                    



              Medical                                                  



              Group,                                                  



              [facility]                                                  



              [facility],                                                  



              [place]                                                  



                                                                      



                                                                      

 

       Acute         Problem Active               2020               [facility]

ia



       urinary                                    22:36:35               l



       tract    Acute                                                  [place]



       infection urinary                                                  



       (disorder) tract                                                   



              infection                                                  



              (disorder)                                                  



                                                                      



                                                                      



               Active                                                  



                                                                      



                                                                      



                                                                      



                                                                      



              Problem                                                  



                                                                      



                                                                      



              2020                                                  



                                                                      



                                                                      



                                                                      



                                                                      



                                                                    



              Medical                                                  



              Group                                                   



                                                                      



                                                                      

 

       Chronic        Problem Active               2020               [place]

[facility]



       renal                                     22:36:35               l



       impairment   Chronic                                                  Her

[place]



       (disorder) renal                                                   



              impairment                                                  



              (disorder)                                                  



                                                                      



                                                                      



               Active                                                  



                                                                      



                                                                      



                                                                      



                                                                      



              Problem                                                  



                                                                      



                                                                      



              2020                                                  



                                                                      



                                                                      



                                                                      



                                                                      



                                                                    



              Medical                                                  



              Group,                                                  



              OPIL[facility] Zarate,                                                  



              [facility]                                                  



              [facility],                                                  



              [facility]                                                     



              [facility]                                                  



              [facility]                                                  



              [place]                                                  



              [facility]                                                  



              [facility]                                                  



              [place]                                                  



                                                                      



                                                                      

 

       Diabetes        Problem Active               2020               [facility]

[facility]



       mellitus                                    23:27:54               l



       (disorder)   Diabetes                                                  He

rmann



              mellitus                                                  



              (disorder)                                                  



                                                                      



                                                                      



               Active                                                  



                                                                      



                                                                      



                                                                      



                                                                      



              Problem                                                  



                                                                      



                                                                      



              2020                                                  



                                                                      



                                                                      



                                                                      



                                                                      



                                                                    



              Medical                                                  



              Group,                                                  



              [employer]                                                  



              [place]                                                    



                                                                      



                                                                      

 

       Urinalysis        Problem Active               2021               [facility]

[facility]



       = abnormal                                    22:17:41               l



       (finding)                                                         [place]



              Urinalysis                                                  



              = abnormal                                                  



              (finding)                                                  



                                                                      



                                                                      



              Active                                                  



                                                                      



                                                                      



                                                                      



                                                                      



              Problem                                                  



                                                                      



                                                                      



              2021                                                  



                                                                      



                                                                      



                                                                      



                                                                      



                                                                    



              Medical                                                  



              Group,                                                  



              [employer]                                                  



              [place],                                                  



              [place]                                                  



                                                                      



                                                                      

 

       Atrial        Problem Active               2021               [facility]

ia



       fibrillati                                    22:17:41               l



       on       Atrial                                                  [place]



       (disorder) fibrillati                                                  



              on                                                      



              (disorder)                                                  



                                                                      



                                                                      



               Active                                                  



                                                                      



                                                                      



                                                                      



                                                                      



              Problem                                                  



                                                                      



                                                                      



              2021                                                  



                                                                      



                                                                      



                                                                      



                                                                      



                                                                    



              Medical                                                  



              Group,                                                  



              [employer]                                                  



              [place],                                                  



              [place]                                                  



                                                                      



                                                                      

 

       Benign        Problem Active               2021               [facility]

ia



       essential                                    22:17:41               l



       hypertensi   Benign                                                  [facility]

daryl



       on     essential                                                  



       (disorder) hypertensi                                                  



              on                                                      



              (disorder)                                                  



                                                                      



                                                                      



               Active                                                  



                                                                      



                                                                      



                                                                      



                                                                      



              Problem                                                  



                                                                      



                                                                      



              2021                                                  



                                                                      



                                                                      



                                                                      



                                                                      



                                                                    



              Medical                                                  



              Group,                                                  



              [facility]                                                    



              [facility],                                                  



              [employer]                                                  



              [place],                                                  



              [facility]                                                     



              [facility]                                                  



              [facility]                                                  



              [place]                                                  



              [facility]                                                  



              [facility],                                                  



              [place]                                                  



                                                                      



                                                                      

 

       Cardiorena        Problem Active               2021               [facility]

[facility]



       l syndrome                                    22:17:41               l



       (disorder)                                                         [place]

n



              Cardiorena                                                  



              l syndrome                                                  



              (disorder)                                                  



                                                                      



                                                                      



               Active                                                  



                                                                      



                                                                      



                                                                      



                                                                      



              Problem                                                  



                                                                      



                                                                      



              2021                                                  



                                                                      



                                                                      



                                                                      



                                                                      



                                                                    



              Medical                                                  



              Group,                                                  



              [employer]                                                  



              [place],                                                  



              [place]                                                  



                                                                      



                                                                      

 

       Chronic        Problem Active               2021               [place]

milan



       kidney                                    22:17:41               l



       disease   Chronic                                                  Donny

n



       (disorder) kidney                                                  



              disease                                                  



              (disorder)                                                  



                                                                      



                                                                      



               Active                                                  



                                                                      



                                                                      



                                                                      



                                                                      



              Problem                                                  



                                                                      



                                                                      



              2021                                                  



                                                                      



                                                                      



                                                                      



                                                                      



                                                                    



              Medical                                                  



              Group,                                                  



              [employer]                                                  



              [place],                                                  



              [place]                                                  



                                                                      



                                                                      

 

       Chronic        Problem Active               2021               [place]

milan



       kidney                                    22:17:41               l



       disease   Chronic                                                  Donny

n



       stage 3 kidney                                                  



       (disorder) disease                                                  



              stage 3                                                  



              (disorder)                                                  



                                                                      



                                                                      



               Active                                                  



                                                                      



                                                                      



                                                                      



                                                                      



              Problem                                                  



                                                                      



                                                                      



              2021                                                  



                                                                      



                                                                      



                                                                      



                                                                      



                                                                    



              Medical                                                  



              Group,                                                  



              [employer]                                                  



              [place],                                                  



              [place]                                                  



                                                                      



                                                                      

 

       Chronic        Problem Active               2021               [place]

milan



       pain                                      22:17:41               l



       (finding)   Chronic                                                  Herm

daryl



              pain                                                    



              (finding)                                                  



                                                                      



                                                                      



              Active                                                  



                                                                      



                                                                      



                                                                      



                                                                      



              Problem                                                  



                                                                      



                                                                      



              2021                                                  



                                                                      



                                                                      



                                                                      



                                                                      



                                                                    



              Medical                                                  



              Group,                                                  



              [employer]                                                  



              [place],                                                  



              [place]                                                  



                                                                      



                                                                      

 

       Dependence        Problem Active               2021               [facility]

[facility]



       on                                        22:17:41               l



       supplement                                                         [place]ny

n



       al oxygen [place]                                                  



       (finding) on                                                      



              supplement                                                  



              al oxygen                                                  



              (finding)                                                  



                                                                      



                                                                      



              Active                                                  



                                                                      



                                                                      



                                                                      



                                                                      



              Problem                                                  



                                                                      



                                                                      



              2021                                                  



                                                                      



                                                                      



                                                                      



                                                                      



                                                                    



              Medical                                                  



              Group,                                                  



              [place]                                                  



                                                                      



                                                                      

 

       Edema of        Problem Active               2021               [facility]

[facility]



       lower                                     22:17:41               l



       extremity   Edema of                                                  Her

[place]



       (finding) lower                                                   



              extremity                                                  



              (finding)                                                  



                                                                      



                                                                      



              Active                                                  



                                                                      



                                                                      



                                                                      



                                                                      



              Problem                                                  



                                                                      



                                                                      



              2021                                                  



                                                                      



                                                                      



                                                                      



                                                                      



                                                                    



              Medical                                                  



              Group,                                                  



              [employer]                                                  



              [place],                                                  



              [place]                                                  



                                                                      



                                                                      

 

       Hyperlipid        Problem Active               2021               [facility]

[facility]



       emia                                      22:17:41               l



       (disorder)                                                         [place]

n



              Hyperlipid                                                  



              emia                                                    



              (disorder)                                                  



                                                                      



                                                                      



               Active                                                  



                                                                      



                                                                      



                                                                      



                                                                      



              Problem                                                  



                                                                      



                                                                      



              2021                                                  



                                                                      



                                                                      



               Data                                                   



              migrated                                                  



              from [facility]                                                  



              on                                                      



              5/30/15.                                                  



                                                                      



                                                                      



               Medical                                                  



              Group,                                                  



              [facility],                                                  



              [facility]                                                  



              Land,[facility]                                                     



              [place]                                                  



              Trace,[facility]                                                  



              Bone &                                                  



              Joint,                                                  



              [place]                                                  



                                                                      



                                                                      

 

       Hyperparat        Problem Active               2021               M

emoria



       hyroidism                                    22:17:41               l



       (disorder)                                                         Donny

n



              Hyperparat                                                  



              hyroidism                                                  



              (disorder)                                                  



                                                                      



                                                                      



               Active                                                  



                                                                      



                                                                      



                                                                      



                                                                      



              Problem                                                  



                                                                      



                                                                      



              2021                                                  



                                                                      



                                                                      



                                                                      



                                                                      



                                                                    



              Medical                                                  



              Group,                                                  



              [facility]                                                  



              [place],                                                  



              [place]                                                  



                                                                      



                                                                      

 

       Hypertensi        Problem Active               2021               M

emoria



       ve                                        22:17:41               l



       disorder,                                                         [place]



       systemic Hypertensi                                                  



       arterial ve                                                      



       (disorder) disorder,                                                  



              systemic                                                  



              arterial                                                  



              (disorder)                                                  



                                                                      



                                                                      



               Active                                                  



                                                                      



                                                                      



                                                                      



                                                                      



              Problem                                                  



                                                                      



                                                                      



              2021                                                  



                                                                      



                                                                      



                                                                      



                                                                      



                                                                    



              Medical                                                  



              Group,[facility]                                                    



              [facility]                                                  



              [facility]                                                  



              [facility]                                                  



              Land,                                                  



              [facility]                                                  



              [place],                                                  



              [place]                                                  



                                                                      



                                                                      

 

       Hypertensi        Problem Active               2021               M

emoria



       ve renal                                    22:17:41               l



       disease                                                         [place]



       (disorder) Hypertensi                                                  



              ve renal                                                  



              disease                                                  



              (disorder)                                                  



                                                                      



                                                                      



               Active                                                  



                                                                      



                                                                      



                                                                      



                                                                      



              Problem                                                  



                                                                      



                                                                      



              2021                                                  



                                                                      



                                                                      



                                                                      



                                                                      



                                                                    



              Medical                                                  



              Group,                                                  



              [facility]                                                  



              [place],                                                  



              [place]                                                  



                                                                      



                                                                      

 

       Hyperurice        Problem Active               2021               M

emoria



       keegan                                       22:17:41               l



       (disorder)                                                         Donny

n



              Hyperurice                                                  



              keegan                                                     



              (disorder)                                                  



                                                                      



                                                                      



               Active                                                  



                                                                      



                                                                      



                                                                      



                                                                      



              Problem                                                  



                                                                      



                                                                      



              2021                                                  



                                                                      



                                                                      



               Data                                                   



              migrated                                                  



              from [facility]                                                  



              on                                                      



              5/30/15.                                                  



                                                                      



                                                                      



               Medical                                                  



              Group,                                                  



              [facility]                                                    



              [place],                                                  



              [facility]                                                  



              Land,                                                  



              [facility]                                                     



              [place]                                                  



              [facility]                                                  



              [place]                                                  



              Bone &                                                  



              Joint,                                                  



              [place]                                                  



                                                                      



                                                                      

 

       Lymphedema        Problem Active               2021               M

emoria



       of lower                                    22:17:41               l



       extremity                                                         [place]



       (disorder) Lymphedema                                                  



              of lower                                                  



              extremity                                                  



              (disorder)                                                  



                                                                      



                                                                      



               Active                                                  



                                                                      



                                                                      



                                                                      



                                                                      



              Problem                                                  



                                                                      



                                                                      



              2021                                                  



                                                                      



                                                                      



                                                                      



                                                                      



                                                                    



              Medical                                                  



              Group,                                                  



              [facility]                                                  



              Land,                                                  



              [place]                                                  



                                                                      



                                                                      

 

       Malignant        Problem Active               2021               Me

moria



       neoplasm                                    22:17:41               l



       of skin of                                                         Donny

n



       upper limb Malignant                                                  



       (disorder) neoplasm                                                  



              of skin of                                                  



              upper limb                                                  



              (disorder)                                                  



                                                                      



                                                                      



               Active                                                  



                                                                      



                                                                      



                                                                      



                                                                      



              Problem                                                  



                                                                      



                                                                      



              2021                                                  



                                                                      



                                                                      



                                                                      



                                                                      



                                                                    



              Medical                                                  



              Group,                                                  



              [facility]                                                  



              Land,                                                  



              [place]                                                  



                                                                      



                                                                      

 

       Morbid        Problem Active               2021               Memor

ia



       obesity                                    22:17:41               l



       (disorder)   Morbid                                                  Herm

daryl



              obesity                                                  



              (disorder)                                                  



                                                                      



                                                                      



               Active                                                  



                                                                      



                                                                      



                                                                      



                                                                      



              Problem                                                  



                                                                      



                                                                      



              2021                                                  



                                                                      



                                                                      



                                                                      



                                                                      



                                                                    



              Medical                                                  



              Group,                                                  



              [facility]                                                    



              [place],                                                  



              [facility]                                                  



              Land,                                                  



              [facility]                                                     



              [place]                                                  



              [facility]                                                  



              [place]                                                  



              Bone &                                                  



              Joint,                                                  



              [place]                                                  



                                                                      



                                                                      

 

       Post-disch        Problem Active               2021               M

emoria



       arge                                      22:17:41               l



       follow-up                                                         [place]



       (finding) Post-disch                                                  



              arge                                                    



              follow-up                                                  



              (finding)                                                  



                                                                      



                                                                      



              Active                                                  



                                                                      



                                                                      



                                                                      



                                                                      



              Problem                                                  



                                                                      



                                                                      



              2021                                                  



                                                                      



                                                                      



                                                                      



                                                                      



                                                                    



              Medical                                                  



              Group,                                                  



              [place]                                                  



                                                                      



                                                                      

 

       Prerenal        Problem Active               2021               Mem

oria



       azotemia                                    22:17:41               l



       (disorder)   Prerenal                                                  He

rmann



              azotemia                                                  



              (disorder)                                                  



                                                                      



                                                                      



               Active                                                  



                                                                      



                                                                      



                                                                      



                                                                      



              Problem                                                  



                                                                      



                                                                      



              2021                                                  



                                                                      



                                                                      



                                                                      



                                                                      



                                                                    



              Medical                                                  



              Group,                                                  



              [facility]                                                  



              Land,                                                  



              [place]                                                  



                                                                      



                                                                      

 

       Proteinuri        Problem Active               2021               M

emoria



       a                                         22:17:41               l



       (finding)                                                         [place]



              Proteinuri                                                  



              a                                                       



              (finding)                                                  



                                                                      



                                                                      



              Active                                                  



                                                                      



                                                                      



                                                                      



                                                                      



              Problem                                                  



                                                                      



                                                                      



              2021                                                  



                                                                      



                                                                      



                                                                      



                                                                      



                                                                    



              Medical                                                  



              Group,                                                  



              [employer]                                                  



              [place],                                                  



              [place]                                                  



                                                                      



                                                                      

 

       Stasis        Problem Active               2021               [facility]

ia



       ulcer                                     22:17:41               l



       (disorder)   Stasis                                                  [facility]

daryl



              ulcer                                                   



              (disorder)                                                  



                                                                      



                                                                      



               Active                                                  



                                                                      



                                                                      



                                                                      



                                                                      



              Problem                                                  



                                                                      



                                                                      



              2021                                                  



                                                                      



                                                                      



                                                                      



                                                                      



                                                                    



              Medical                                                  



              Group,                                                  



              [employer]                                                  



              [place],                                                  



              [place]                                                  



                                                                      



                                                                      

 

       Swelling -        Problem Active               2021               [facility]

[facility]



       edema -                                    22:17:41               l



       symptom   Swelling                                                  [place]

nn



       (finding) - edema -                                                  



              symptom                                                  



              (finding)                                                  



                                                                      



                                                                      



              Active                                                  



                                                                      



                                                                      



                                                                      



                                                                      



              Problem                                                  



                                                                      



                                                                      



              2021                                                  



                                                                      



                                                                      



                                                                      



                                                                      



                                                                    



              Medical                                                  



              Group,                                                  



              [facility] [employer]                                                  



              [place],                                                  



              [place]                                                  



                                                                      



                                                                      

 

       Swollen        Problem Active               2021               [place]

[place]



       ankle                                     22:17:41               l



       (finding)   Swollen                                                  [facility]

daryl



              ankle                                                   



              (finding)                                                  



                                                                      



                                                                      



              Active                                                  



                                                                      



                                                                      



                                                                      



                                                                      



              Problem                                                  



                                                                      



                                                                      



              2021                                                  



                                                                      



                                                                      



                                                                      



                                                                      



                                                                    



              Medical                                                  



              Group,                                                  



              [facility] [employer]                                                  



              [place],                                                  



              [place]                                                  



                                                                      



                                                                      

 

       Urinary        Problem Resolve               2021               [facility]

[facility]



       tract                d                    22:17:41               l



       infectious   Urinary                                                  Her

[place]



       disease tract                                                   



       (disorder) infectious                                                  



              disease                                                  



              (disorder)                                                  



                                                                      



                                                                      



               Resolved                                                  



                                                                      



                                                                      



                                                                      



                                                                      



                Problem                                                  



                                                                      



                                                                      



              2021                                                  



                                                                      



                                                                      



                                                                      



                                                                      



                                                                    



              Medical                                                  



              Group,                                                  



              [employer]                                                  



              [place],                                                  



              [place]                                                  



                                                                      



                                                                      

 

       Venous        Problem Active               2021               [facility]

ia



       ulcer of                                    22:17:41               l



       leg      Venous                                                  [place]



       (disorder) ulcer of                                                  



              leg                                                     



              (disorder)                                                  



                                                                      



                                                                      



               Active                                                  



                                                                      



                                                                      



                                                                      



                                                                      



              Problem                                                  



                                                                      



                                                                      



              2021                                                  



                                                                      



                                                                      



                                                                      



                                                                      



                                                                    



              Medical                                                  



              Group,                                                  



              [facility]                                                  



              [place],                                                  



              [place]                                                  



                                                                      



                                                                      

 

       Weight        Problem Active               2021               [facility]

ia



       gain                                      22:17:41               l



       finding   Weight                                                  [place]



       (finding) gain                                                    



              finding                                                  



              (finding)                                                  



                                                                      



                                                                      



              Active                                                  



                                                                      



                                                                      



                                                                      



                                                                      



              Problem                                                  



                                                                      



                                                                      



              2021                                                  



                                                                      



                                                                      



                                                                      



                                                                      



                                                                    



              Medical                                                  



              Group,                                                  



              [facility]                                                  



              [place],                                                  



              [place]                                                  



                                                                      



                                                                      

 

       Acute         Problem Active               2016               [facility]

ia



       renal                                     00:23:22               l



       failure   Acute                                                  [place]



       syndrome renal                                                   



       (disorder) failure                                                  



              syndrome                                                  



              (disorder)                                                  



                                                                      



                                                                      



               Active                                                  



                                                                      



                                                                      



                                                                      



                                                                      



              Problem                                                  



                                                                      



                                                                      



              2016                                                  



                                                                      



                                                                      



               Data                                                   



              migrated                                                  



              from [facility]                                                  



              on                                                      



              5/30/15.                                                  



                                                                      



                                                                      



               [facility]                                                  



              [place],                                                  



              [employer]                                                  



              [place],[facility]                                                     



              [place]                                                  



              [facility]                                                  



              [place]                                                  



              [facility]                                                  



              [facility]                                                   



                                                                      



                                                                      

 

       Kidney        Problem Active               2017               [facility]

ia



       disease                                    03:07:28               l



       (disorder)   Kidney                                                  Herm

daryl



              disease                                                  



              (disorder)                                                  



                                                                      



                                                                      



               Active                                                  



                                                                      



                                                                      



                                                                      



                                                                      



              Problem                                                  



                                                                      



                                                                      



              2017                                                  



                                                                      



                                                                      



                                                                      



                                                                      



                                                                    



              [place]                                                  



                                                                      



                                                                      

 

       Migraine        Problem Active               2017               [facility]

[facility]



       (disorder)                                    03:07:28               l



                Migraine                                                  [place]

n



              (disorder)                                                  



                                                                      



                                                                      



               Active                                                  



                                                                      



                                                                      



                                                                      



                                                                      



              Problem                                                  



                                                                      



                                                                      



              2017                                                  



                                                                      



                                                                      



                                                                      



                                                                      



                                                                      



              [facility]                                                  



              [facility] [place]                                                  



              [place]                                                  



                                                                      



                                                                      

 

       RIGHT         Diagnosis Active               2016               [facility]

[facility]



       WRIST                                     15:06:00               l



       DISTAL   RIGHT                                                  [place]



       RADIUS WRIST                                                   



       CLSD FX DISTAL                                                  



              RADIUS                                                  



              CLSD FX                                                  



                                                                      



                                                                      



              Active                                                  



                                                                      



                                                                      



                                                                      



                                                                      



                                                                      



                                                                      



                                                                      



                                                                      



                                                                      



                                                                      



                   [facility]                                                  



              [facility]                                                  



              [facility]                                                   



                                                                      



                                                                      

 

       DISTAL        Diagnosis Active               2016               [facility]

[facility]



       RADIUS                                    09:46:00               l



       CLSD FX   DISTAL                                                  [place]



              RADIUS                                                  



              CLSD FX                                                  



                                                                      



                                                                      



              Active                                                  



                                                                      



                                                                      



                                                                      



                                                                      



                                                                      



                                                                      



                                                                      



                                                                      



                                                                      



                                                                      



                                                                     



              [facility]                                                     



              [place]                                                  



              Trace                                                   



                                                                      



                                                                      

 

       Cholestero        Problem                      2016               [facility]

[facility]



       l                                         05:02:18               l



       (substance                                                         Donny

n



       )      Cholestero                                                  



              l                                                       



              (substance                                                  



              )                                                       



                                                                      



                                                                      



                                                                      



                                                                      



                                                                      



                                                                      



              Problem                                                  



                                                                      



                                                                      



              2016                                                  



                                                                      



                                                                      



                                                                      



                                                                      



                                                                      



              [facility]                                                  



              [facility]                                                  



              [facility]                                                  



               [facility]                                                  



              [facility]                                                    



                                                                      



                                                                      

 

       Sleep         Problem                      2016               [facility]

ia



       apnea                                     05:02:18               l



       (finding)   Sleep                                                  [facility]

n



              apnea                                                   



              (finding)                                                  



                                                                      



                                                                      



                                                                      



                                                                      



                                                                      



                                                                      



                                                                      



              Problem                                                  



                                                                      



                                                                      



              2016                                                  



                                                                      



                                                                      



               2does not                                                  



              use cpap                                                  



                                                                      



                                                                      



              [facility]                                                  



              Specialty                                                  



              [facility]                                                  



              [facility]                                                    



                                                                      



                                                                      

 

       Dependence        Problem        2021            

   [facility]



       on                             01:29:16 01:29:16               l



       supplement                      21:17:                             [facility]

n



       al oxygen Dependence               00                                 



              on                                                      



              supplement                                                  



              al oxygen                                                  



                                                                      



                                                                      



                                                                      



                                                                      



                                                                      



              2021                                                  



                                                                      



                                                                      



                                                                      



                                                                      



                                                                    



              Medical                                                  



              Group                                                   



                                                                      



                                                                      

 

       Other         Problem        2021               [facility]

[facility]



       hyperlipid                         01:29:16 01:29:16               l



       emia     Other               21:16:                             [place]



              hyperlipid               00                                 



              emia                                                    



                                                                      



                                                                      



                                                                      



                                                                      



              2021                                                  



                                                                      



                                                                      



                                                                      



                                                                      



                                                                    



              Medical                                                  



              Group                                                   



                                                                      



                                                                      

 

       Unsteadine        Problem        2021            

   [facility]



       ss on feet                         01:29:16 01:29:16               l



                                   21:13:                             [place]



              Unsteadine               00                                 



              ss on feet                                                  



                                                                      



                                                                      



                                                                      



                                                                      



                                                                      



              2021                                                  



                                                                      



                                                                      



                                                                      



                                                                      



                                                                    



              Medical                                                  



              Group                                                   



                                                                      



                                                                      

 

       Weakness        Problem        2021              

 [facility]



                                      01:29:16 01:29:16               l



                Weakness               21:13:                             [facility]

n



                                   00                                 



                                                                      



                                                                      



                                                                      



                                                                      



              2021                                                  



                                                                      



                                                                      



                                                                      



                                                                      



                                                                    



              Medical                                                  



              Group                                                   



                                                                      



                                                                      

 

       Essential        Problem        2021             

  [facility]



       (primary)                         01:29:16 01:29:16               l



       hypertensi                      21:12:                             [place] martinez



       on     Essential               00                                 



              (primary)                                                  



              hypertensi                                                  



              on                                                      



                                                                      



                                                                      



                                                                      



                                                                      



              2021                                                  



                                                                      



                                                                      



                                                                      



                                                                      



                                                                    



              Medical                                                  



              Group                                                   



                                                                      



                                                                      

 

       Other long        Problem        2021            

   [facility]



       term                           22:39:43 22:39:43               l



       (current)   Other               22:29:                             [facility]

n



       drug   long term               00                                 



       therapy (current)                                                  



              drug                                                    



              therapy                                                  



                                                                      



                                                                      



                                                                      



                                                                      



              2021                                                  



                                                                      



                                                                      



                                                                      



                                                                      



                                                                    



              Medical                                                  



              Group                                                   



                                                                      



                                                                      

 

       Other         Problem        2021               SUSIE gonzalez



       abnormal                         22:39:43 22:39:43               l



       glucose   Other               22:23:                             [facility]



              abnormal               00                                 



              glucose                                                  



                                                                      



                                                                      



                                                                      



                                                                      



              2021                                                  



                                                                      



                                                                      



                                                                      



                                                                      



                                                                    



              Medical                                                  



              Group                                                   



                                                                      



                                                                      

 

       Acute         Problem Resolve -2016               

[facility]



       otitis               d      -24   00:23:22 00:23:22               l



       media    Acute               00:00:                             [facility]



       (disorder) otitis               00                                 



              media                                                   



              (disorder)                                                  



                                                                      



                                                                      



               Resolved                                                  



                                                                      



                                                                      



              2013                                                  



                                                                      



                                                                      



               Problem                                                  



                                                                      



                                                                      



              2016                                                  



                                                                      



                                                                      



               Data                                                   



              migrated                                                  



              from [facility]                                                  



              on                                                      



              7/18/15.                                                  



                                                                      



                                                                      



               ILA Zarate,                                                  



              [facility]                                                  



              [place],[facility]                                                     



              [place]                                                  



              [facility]                                                  



              Bone &                                                  



              Joint                                                   



                                                                      



                                                                      







Allergies, Adverse Reactions, Alerts







       Allergy Allergy Status Severity Reaction(s) Onset  Inactive Treating Comm

ents 

Source



       Name   Type                        Date   Date   Clinician        

 

       penicill penicill Active                                           [facility]

a



       ins<sup> ins<sup>                                                  l



       1</sup> 1</sup>                                                  [facility]

 

       codeine< codeine< Active                                           [facility]

a



       sup>1</s sup>1</s                                                  l



       up>    up>                                                     [facility]

 

       cefepime cefepime Active                                           [facility]

a



                                                                      l



                                                                      [facility]

 

       Levaquin Levaquin Active                                           [facility]

a



                                                                      l



                                                                      [place]

 

       penicill penicill Active                                           [facility]

a



       ins<sup> ins<sup>                                                  l



       2</sup> 2</sup>                                                  [facility]

 

       codeine codeine Active                                           [facility]



                                                                      l



                                                                      [facility]

 

       penicill penicill Active                                           [facility]

a



       ins    ins                                                     l



                                                                      [facility]







Social History







           Social Habit Start Date Stop Date  Quantity   Comments   Source

 

           Social History 2019-10-25 2019-10-25                       Doreen nguyen



                      14:54:48   14:54:48                         









                Smoking Status  Start Date      Stop Date       Source

 

                Social History                                  [facility]







Medications







       Ordered Filled Start  Stop   Current Ordering Indication Dosage Frequency

 Signature

                    Comments            Components          Source



     Medication Medication Date Date Medication? Clinician                (SIG) 

          



     Name Name                                                   

 

     Mupirocin      2021      Yes                      1 appl,           [facility]

ia



     0.02 MG/MG      1-22                               TOP, TID,           l



     Topical      23:21:                               X 5 day, #           [facility]

d[facility]



     Ointment      00                                 22 gm, 0           



                                                  Refill(s),           



                                                  Pharmacy:           



                                                  [facility]           



                                                  [facility]           



                                                  #[id number],           



                                                  165.1, cm,           



                                                  21           



                                                  14:08:00           



                                                  CST,           



                                                  Height,           



                                                  136.42,           



                                                  kg,            



                                                  21           



                                                  14:08:00           



                                                  CST,           



                                                  Weight           

 

     bumetanide      2021      Yes                      2 mg = 1           [facility]

[facility]



     2 mg oral      1-19                               tab, PO,           l



     tablet      21:11:                               Daily, #           [place]



               00                                 30 tab, 0           



                                                  Refill(s)           

 

     allopurinol      2021      Yes                      100 mg = 1           

[facility]



     100 mg oral      1-19                               tab, PO,           l



     tablet      21:10:                               BID, # 60           [facility]

n



               00                                 tab, 0           



                                                  Refill(s)           

 

     gabapentin      2021      Yes                      200 mg = 2           [facility]

[facility]



     100 MG Oral      1-19                               cap, PO,           l



     Capsule      21:10:                               Bedtime, 0           [facility]

d[facility]



               00                                 Refill(s)           

 

     QUEtiapine            Yes                      1 tablet,           [facility]

[facility]



     50 mg oral      3-30                               once a           l



     tablet      13:55:                               day, 0           [facility]



               00                                 Refill(s)           

 

     torsemide            Yes                      1 tablet,           [facility]

[facility]



     20 mg oral      3-30                               twice a           l



     tablet      13:55:                               day, 0           [place]



               00                                 Refill(s)           

 

     potassium            Yes                      1 tablet,           [facility]

[facility]



     chloride 20      3-30                               once a           l



     mEq oral      13:54:                               day, 0           [facility]



     tablet,      00                                 Refill(s)           



     extended                                                        



     release                                                        



     (KCL)                                                        

 

     allopurinol            Yes                      1/2            [facility]

a



     300 mg oral      3-30                               tablet,           l



     tablet      13:53:                               once a           [facility]



               00                                 day, 0           



                                                  Refill(s)           

 

     carvedilol            Yes                      1 tablet,           [facility]

[facility]



     3.125 mg      3-30                               twice a           l



     oral tablet      13:53:                               day, 0           [facility]

d[facility]



               00                                 Refill(s)           

 

     Digoxin            Yes                      1 tablet,           [facility]

ia



     0.125 MG      3-30                               once a           l



     Oral Tablet      13:53:                               day, 0           [facility]

[facility]



               00                                 Refill(s)           

 

     DULoxetine            Yes                      1 capsule,           M

[facility]



     60 mg oral      3-30                               once a           l



     delayed      13:53:                               day, 0           [place]



     release      00                                 Refill(s)           



     capsule                                                        

 

     apixaban 5            Yes                      1 tablet,           Me

[facility]



     MG Oral      3-30                               twice a           l



     Tablet      13:53:                               day, 0           [facility]



     [Eliquis]      00                                 Refill(s)           

 

     gabapentin            Yes                      1 capsule,           M

[facility]



     300 MG Oral      3-30                               twice a           l



     Capsule      13:53:                               day, 0           [facility]



               00                                 Refill(s)           

 

     metoprolol            Yes                      1 tablet,           Me

[facility]



     tartrate 50      3-30                               Twice a           l



     mg oral      13:53:                               day, 0           [facility]



     tablet      00                                 Refill(s)           

 

     midodrine 5            Yes                      1 tablet,           M

[facility]



     mg oral      3-30                               twice a           l



     tablet      13:53:                               day, 0           [facility]



               00                                 Refill(s)           

 

     DULoxetine      2020      Yes                      60 mg = 1           Me

[facility]



     60 mg oral      1-25                               cap, PO,           l



     delayed      17:17:                               Daily, #           [facility]

n



     release      00                                 30 cap, 0           



     capsule                                         Refill(s)           

 

     Spironolact      2020      Yes                      25 mg = 1           M

[facility]



     one 25 MG      1-25                               tab, PO,           l



     Oral Tablet      17:17:                               Daily, 0           He

[facility]ann



     [Aldactone]      00                                 Refill(s)           

 

     Digoxin      2020      Yes                      0.125 mg,           [facility]

ia



     0.125 MG      1-25                               PO, Daily,           l



     Oral Tablet      17:13:                               # 30 tab,           H

[facility]



               00                                 0              



                                                  Refill(s)           

 

     Mupirocin      2020      Yes                      See            [facility]



     0.02 MG/MG      0-13                               Instructio           l



     Topical      15:44:                               ns, APPLY           [facility]

nn



     Ointment      00                                 EXTERNALLY           



                                                  TO THE           



                                                  AFFECTED           



                                                  AREA TWICE           



                                                  DAILY, #           



                                                  66 gm, 1           



                                                  Refill(s),           



                                                  Pharmacy:           



                                                  [facility]           



                                                  [facility]           



                                                  #[id number],           



                                                  170.18,           



                                                  cm,            



                                                  20           



                                                  14:42:00           



                                                  CST,           



                                                  Height,           



                                                  164.318,           



                                                  kg,            



                                                  20           



                                                  14:42:00           



                                                  CST,           



                                                  Weight           

 

     Nitrofurant            Yes                      100 mg = 1           

[facility]



     oin 100 MG      8-09                               cap, PO,           l



     Oral      17:57:                               BID, X 10           [facility]



     Capsule      00                                 day, # 20           



     [Macrobid]                                         cap, 0           



                                                  Refill(s),           



                                                  Pharmacy:           



                                                  [facility]           



                                                  #[id number],           



                                                  170.18,           



                                                  cm,            



                                                  20           



                                                  14:42:00           



                                                  CST,           



                                                  Height,           



                                                  164.318,           



                                                  kg,            



                                                  20           



                                                  14:42:00           



                                                  CST,           



                                                  Weight           

 

     lisinopril      2020-0      Yes                      2.5 mg = 1           M

[facility]



     2.5 mg oral      6-04                               tab, PO,           l



     tablet      23:26:                               Daily, #           [facility]



               00                                 30 tab, 2           



                                                  Refill(s),           



                                                  called to           



                                                  pharmacy           

 

     calcitriol      2020-0      Yes                      = 1 cap,           [facility]

[facility]



     0.25 mcg      5-20                               PO, Daily,           l



     oral      12:18:                               # 90 cap,           [facility]



     capsule      00                                 1              



                                                  Refill(s),           



                                                  Pharmacy:           



                                                  [facility]           



                                                  #[id number]           

 

     calcitriol      2020-0      Yes                      = 1 cap,           [facility]

[facility]



     0.25 mcg      4-16                               PO, Daily,           l



     oral      16:37:                               # 90           [facility]



     capsule      47                                 unknown           



                                                  unit,           



                                                  Pharmacy:           



                                                  [facility]           



                                                  #[id number]           

 

     Furosemide      -0      Yes                      = 1 tab,           [facility]

[facility]



     40 MG Oral      4-13                               PO, Every           l



     Tablet      20:06:                               Other Day,           [facility]

nn



               19                                 # 45 tab,           



                                                  Pharmacy:           



                                                  [facility]           



                                                  #[id number]           

 

     prednisolon      2020-0      Yes                      1 drop in           M

[facility]



     e acetate      4-10                               each eye,           l



     10 MG/ML      20:53:                               once           [place]



     Ophthalmic      00                                 daily, 0           



     Suspension                                         Refill(s)           

 

     bromfenac      -0      Yes                      1 drop in           [facility]

oria



     0.7 MG/ML      4-10                               right eye,           l



     Ophthalmic      20:53:                               once           [facility]



     Solution      00                                 daily, 0           



     [Prolensa]                                         Refill(s)           

 

     Furosemide      -0      Yes                      = 1 tab,           [facility]

[facility]



     40 MG Oral      1-23                               PO, Every           l



     Tablet      15:13:                               Other Day,           [facility]

nn



               34                                 # 45 tab,           



                                                  Pharmacy:           



                                                  [facility]           



                                                  #[id number]           

 

     Mupirocin      2019      Yes                      See            [facility]



     0.02 MG/MG      2-27                               Instructio           l



     Topical      19:11:                               ns, # 66           [facility]

n



     Ointment      15                                 gm, APPLY           



                                                  EXTERNALLY           



                                                  TO THE           



                                                  AFFECTED           



                                                  AREA TWICE           



                                                  DAILY,           



                                                  Pharmacy:           



                                                  [facility]           



                                                  #[id number]           

 

     Nitrofurant      2019      Yes                      100 mg = 1           

[facility]



     oin 100 MG      2-13                               cap, PO,           l



     Oral      01:44:                               BID, X 5           [facility]



     Capsule      00                                 day, # 10           



     [Macrodanti                                         cap, 0           



     n]                                           Refill(s),           



                                                  Pharmacy:           



                                                  [facility]           



                                                  #[id number]           

 

     apixaban 5      2019      Yes                      5 mg, PO,           Me

[facility]



     MG Oral      0-25                               Q12H, 0           l



     Tablet      15:07:                               Refill(s)           [facility]

n



     [Eliquis]      00                                                

 

     metoprolol      2019      Yes                      50 mg = 1           Me

[facility]ia



     tartrate 50      0-25                               tab, PO,           l



     mg oral      15:07:                               BID, # 180           [facility]

daryl



     tablet      00                                 tab, 0           



                                                  Refill(s)           

 

     Diltiazem      2019      Yes                      180 mg = 1           Me

[facility]ia



     Hydrochlori      0-25                               cap, PO,           l



     de       15:07:                               Daily, #           [facility]

daryl



     mg/24 hours      00                                 30 cap, 0           



     oral                                         Refill(s)           



     capsule,                                                        



     extended                                                        



     release                                                        

 

     tramadol      2019      Yes                      150 mg = 1           [facility]

oria



     150 mg/24      0-25                               cap, PO,           l



     hours oral      15:07:                               Daily, 0           Her

hernandes



     capsule,      00                                 Refill(s)           



     extended                                                        



     release                                                        

 

     Acetaminoph      2019      Yes                      1 tab, PO,           

[facility]



     en 325 MG /      0-25                               Q6H, 0           l



     Hydrocodone      15:07:                               Refill(s)           H

[facility]



     Bitartrate      00                                                



     5 MG Oral                                                        



     Tablet                                                        



     [Norco                                                        



     5/325]                                                        

 

     calcitriol      2019      Yes                      = 1 cap,           Mem

oria



     0.25 mcg      0-11                               PO, Daily,           l



     oral      21:10:                               # 90           [place]



     capsule      30                                 unknown           



                                                  unit,           



                                                  Pharmacy:           



                                                  [facility]           



                                                  [facility]           



                                                  #[id number]           

 

     gabapentin            Yes                      300 mg = 1           M

[facility]



     300 MG Oral      9-27                               cap, PO,           l



     Capsule      20:54:                               BID, # 180           [facility]

daryl



               00                                 cap, 3           



                                                  Refill(s),           



                                                  called to           



                                                  pharmacy           

 

     allopurinol            Yes                      = 1 tab,           Me

[facility]



     300 mg oral      8-17                               PO, Daily,           l



     tablet      02:39:                               # 90 tab,           [facility]

n



               31                                 Pharmacy:           



                                                  [facility]           



                                                  [facility]           



                                                  #[id number]           

 

     QUEtiapine            Yes                      50 mg = 1           Me

[facility]ia



     50 mg oral      6-06                               tab, PO,           l



     tablet      15:28:                               Bedtime, #           [facility]

nn



               00                                 30 tab, 1           



                                                  Refill(s)           

 

     eletriptan            Yes                      40 mg = 1           Me

[facility]ia



     40 mg oral      6-06                               tab, PO,           l



     tablet      15:26:                               Daily, PRN           [facility]

nn



               00                                 for            



                                                  migraine           



                                                  headache,           



                                                  may repeat           



                                                  dose once           



                                                  in 2           



                                                  hours, # 6           



                                                  tab, 0           



                                                  Refill(s)           

 

     atorvastati            Yes                      See            [facility]

a



     n 40 mg      3-14                               Instructio           l



     oral tablet      15:07:                               ns, TAKE 1           

[facility]



               35                                 TABLET BY           



                                                  MOUTH           



                                                  EVERY           



                                                  NIGHT AT           



                                                  BEDTIME, #           



                                                  90 tab, 1           



                                                  Refill(s),           



                                                  Pharmacy:           



                                                  [facility]           



                                                  [facility]           



                                                  [id number]           

 

     amLODIPine            Yes                      See            [facility]



     5 mg oral      3-14                               Instructio           l



     tablet      15:07:                               ns, TAKE 1           [facility]

nn



               33                                 TABLET BY           



                                                  MOUTH           



                                                  EVERY DAY,           



                                                  # 90 tab,           



                                                  1              



                                                  Refill(s),           



                                                  Pharmacy:           



                                                  [facility]           



                                                  [facility]           



                                                  [id number]           

 

     lisinopril            Yes                      See            [facility]



     5 mg oral      8-21                               Instructio           l



     tablet      19:00:                               ns, TAKE 1           [facility]

nn



               30                                 TABLET BY           



                                                  MOUTH           



                                                  DAILY, #           



                                                  90 tab, 1           



                                                  Refill(s),           



                                                  Pharmacy:           



                                                  [facility]           



                                                  [facility]           



                                                  [id number]           

 

     atorvastati            Yes                      See            [facility]

a



     n 40 mg      8-21                               Instructio           l



     oral tablet      18:50:                               ns, TAKE 1           

[facility]



               45                                 TABLET BY           



                                                  MOUTH           



                                                  EVERY           



                                                  NIGHT AT           



                                                  BEDTIME, #           



                                                  30 tab, 5           



                                                  Refill(s),           



                                                  Pharmacy:           



                                                  [facility]           



                                                  [facility]           



                                                  [id number]           

 

     amLODIPine            Yes                      See            [facility]



     5 mg oral      8-21                               Instructio           l



     tablet      18:50:                               ns, TAKE 1           [facility]

nn



               41                                 TABLET BY           



                                                  MOUTH           



                                                  EVERY DAY,           



                                                  # 30 tab,           



                                                  5              



                                                  Refill(s),           



                                                  Pharmacy:           



                                                  [facility]           



                                                  [facility]           



                                                  [id number]           

 

     lisinopril            No                       See            [facility]



     5 mg oral      7-31                               Instructio           l



     tablet      15:28:                               ns, # 90           [place]



               34                                 tab, TAKE           



                                                  1 TABLET           



                                                  BY MOUTH           



                                                  DAILY,           



                                                  Pharmacy:           



                                                  [facility]           



                                                  [facility]           



                                                  [id number]           

 

     allopurinol            No                       300 mg = 1           

[facility]



     300 mg oral      5-10                               tab, PO,           l



     tablet      13:59:                               Daily, #           [place]



               00                                 90 tab, 1           



                                                  Refill(s),           



                                                  Pharmacy:           



                                                  [facility]           



                                                  [facility]           



                                                  [id number]           

 

     lisinopril            No                       See            [facility]



     5 mg oral      5-01                               Instructio           l



     tablet      12:38:                               ns, # 90           [place]



               18                                 tab, TAKE           



                                                  1 TABLET           



                                                  BY MOUTH           



                                                  DAILY,           



                                                  Pharmacy:           



                                                  [facility]           



                                                  [facility]           



                                                  [id number]           

 

     ALLOPURINOL            Yes                      See            [facility]

a



     300MG      5-01                               Instructio           l



     TABLETS      12:38:                               ns, # 90           Donny

n



               18                                 tab, TAKE           



                                                  1 TABLET           



                                                  BY MOUTH           



                                                  DAILY,           



                                                  Pharmacy:           



                                                  [facility]           



                                                  [facility] [facility]           



                                                  [id number]           

 

     calcitriol      2018-0      Yes                      0.25           [facility]



     0.25 mcg      3-28                               microgram           l



     oral      13:49:                               = 1 cap,           [facility]



     capsule      00                                 PO, Daily,           



                                                  # 90 cap,           



                                                  3              



                                                  Refill(s),           



                                                  Pharmacy:           



                                                  [facility]           



                                                  D[facility] Store           



                                                  [facility]           

 

     Mupirocin            Yes                      1 appl,           [facility]

ia



     0.02 MG/MG      3-21                               TOP, BID,           l



     Topical      15:37:                               30 grams           Donny

n



     Ointment      21                                 3each, # 3           



                                                  ea, 1           



                                                  Refill(s),           



                                                  Pharmacy:           



                                                  [facility]           



                                                  Drug Store           



                                                  [facility]           

 

     atorvastati            Yes                      See            [facility]

a



     n 40 mg      3-21                               Instructio           l



     oral tablet      15:36:                               ns, TAKE 1           

[facility]



               22                                 TABLET BY           



                                                  MOUTH           



                                                  EVERY           



                                                  NIGHT AT           



                                                  BEDTIME, #           



                                                  30 tab, 5           



                                                  Refill(s),           



                                                  Pharmacy:           



                                                  [facility]           



                                                  [facility] Store           



                                                  [facility]           

 

     amLODIPine            Yes                      See            [facility]



     5 mg oral      3-21                               Instructio           l



     tablet      15:36:                               ns, TAKE 1           [facility]

nn



               18                                 TABLET BY           



                                                  MOUTH           



                                                  EVERY DAY,           



                                                  # 30 tab,           



                                                  5              



                                                  Refill(s),           



                                                  Pharmacy:           



                                                  [facility]           



                                                  Drug Store           



                                                  [facility]           

 

     aspirin 81            Yes                      81 mg = 1           Me

moria



     mg tablet,      1-24                               tab, PO,           l



     enteric      16:34:                               Daily, #           Donny

n



     coated      00                                 90 tab, 3           



                                                  Refill(s)           

 

     Xopenex            No   Ghassan K                0.63 mg =           Mem

oria



     0.63 mg/3      3-11           Chun                3 mL,           l



     mL        01:00:                               Soln, NEB,           [facility]



     inhalation      00                                 Once,           



     solution                                         first dose           



                                                  03/10/16           



                                                  19:00:00           



                                                  CST, stop           



                                                  date           



                                                  03/10/16           



                                                  19:00:00           



                                                  CST            

 

     Misc            No   Deuce                300 mL,           [facility]



     Medication      3-11           Wheat                Soln-IV,           l



               00:03:                               IV, Once,           [facility]



               00                                 first dose           



                                                  03/10/16           



                                                  18:03:00           



                                                  CST, stop           



                                                  date           



                                                  03/10/16           



                                                  18:03:00           



                                                  CST            

 

     promethazin            No   Ghassan K                12.5 mg =          

 [facility]



     e         3-11           Chun                0.5 mL,           l



               00:02:                               Injection,           [place]



               00                                 IM, Once           



                                                  PRN for           



                                                  severe           



                                                  nausea,           



                                                  first dose           



                                                  03/10/16           



                                                  18:02:00           



                                                  CST            

 

     albuterol            No   Ghassan K                2.5 mg = 3           

[facility]



     2.5 mg/3 mL      3-11           Chun                mL, Soln,           

l



     (0.083%)      00:02:                               NEB, Once           [facility]

daryl



     inhalation      00                                 PRN for           



     solution                                         wheezing,           



                                                  first dose           



                                                  03/10/16           



                                                  18:02:00           



                                                  CST            

 

     Demerol HCl            No   Ghassan K                12.5 mg =          

 [facility]



               3-11           Chun                0.25 mL,           l



               00:02:                               Injection,           [place]



               00                                 IV Push,           



                                                  Once PRN           



                                                  for            



                                                  shivers,           



                                                  first dose           



                                                  03/10/16           



                                                  18:02:00           



                                                  CST            

 

     ondansetron            No   Ghassan K                4 mg = 2           

[facility]



               3-11           Chun                mL,            l



               00:02:                               Injection,           [facility]



               00                                 IV Push,           



                                                  q15min PRN           



                                                  for            



                                                  nausea/vom           



                                                  iting,           



                                                  order           



                                                  duration:           



                                                  2 doses,           



                                                  first dose           



                                                  03/10/16           



                                                  18:02:00           



                                                  CST, stop           



                                                  date           



                                                  Limited #           



                                                  of times           

 

     Dilaudid            No   Ghassan K                0.5 mg =           Mem

oria



               3-11           Chun                0.25 mL,           l



               00:02:                               Injection,           [place]



               00                                 IV Push,           



                                                  q10min PRN           



                                                  for pain           



                                                  severe           



                                                  (7-10),           



                                                  first dose           



                                                  03/10/16           



                                                  18:02:00           



                                                  CST            

 

     diphenhydrA            No   Ghassan K                25 mg =           M

[facility]oria



     MINE      3-11           Chun                0.5 mL,           l



               00:02:                               Injection,           [place]



               00                                 IV Push,           



                                                  Once PRN           



                                                  for            



                                                  itching,           



                                                  first dose           



                                                  03/10/16           



                                                  18:02:00           



                                                  CST            

 

     LR 1,000 mL            No   Ghassan K                1,000 mL,          

 [facility]



               3-11           Chun                IV, 75           l



               00:02:                               mL/hr,           [place]



                                                start date           



                                                  03/10/16           



                                                  18:02:00           



                                                  CST            

 

     Saline Lock            No   Ghassan K                10 mL,           Me

moria



     Flush      3-11           Chun                Soln, IV           l



               00:02:                               Push, As           [place]



               00                                 Indicated           



                                                  PRN for           



                                                  flush,           



                                                  first dose           



                                                  03/10/16           



                                                  18:02:00           



                                                  CST            

 

     Bupivacaine            No   Ghassan K                300 mL,           M

[facility]



     0.25% 300      3-11           Chun                Nerve           l



     mL pump 300      00:02:                               Block, 5           He

rmann



     mL        00                                 mL/hr,           



                                                  start date           



                                                  03/10/16           



                                                  18:02:00           



                                                  CST            

 

     Misc            No   Deuce                1,000 mL,           [facility]

a



     Medication      3-10           [place]                Soln-IV,           l



               23:42:                               IV, Once,           [facility]



               00                                 first dose           



                                                  03/10/16           



                                                  17:42:00           



                                                  CST, stop           



                                                  date           



                                                  03/10/16           



                                                  17:42:00           



                                                  CST            

 

     fentaNYL            No   Deuce                25 mcg =           Mem

oria



               3-10           [place]                0.5 mL,           l



               23:20:                               Injection,           [place]



               00                                 IV, Once,           



                                                  first dose           



                                                  03/10/16           



                                                  17:20:00           



                                                  CST, stop           



                                                  date           



                                                  03/10/16           



                                                  17:20:00           



                                                  CST            

 

     ondansetron            No   Deuce                4 mg = 2           

[facility]



               3-10           [place]                mL,            l



               23:03:                               Injection,           [place]



               00                                 IV, Once,           



                                                  first dose           



                                                  03/10/16           



                                                  17:03:00           



                                                  CST, stop           



                                                  date           



                                                  03/10/16           



                                                  17:03:00           



                                                  CST            

 

     fentaNYL            No   Deuce                25 mcg =           Mem

oria



               3-10           [place]                0.5 mL,           l



               23:00:                               Injection,           [facility]



               00                                 IV, Once,           



                                                  first dose           



                                                  03/10/16           



                                                  17:00:00           



                                                  CST, stop           



                                                  date           



                                                  03/10/16           



                                                  17:00:00           



                                                  CST            

 

     fentaNYL            No   Deuce                25 mcg =           Mem

oria



               3-10           [place]                0.5 mL,           l



               22:48:                               Injection,           [facility]



               00                                 IV, Once,           



                                                  first dose           



                                                  03/10/16           



                                                  16:48:00           



                                                  CST, stop           



                                                  date           



                                                  03/10/16           



                                                  16:48:00           



                                                  CST            

 

     fentaNYL            No   Deuce                25 mcg =           Mem

oria



               3-10           [place]                0.5 mL,           l



               22:37:                               Injection,           [facility]



               00                                 IV, Once,           



                                                  first dose           



                                                  03/10/16           



                                                  16:37:00           



                                                  CST, stop           



                                                  date           



                                                  03/10/16           



                                                  16:37:00           



                                                  CST            

 

     dexamethaso            No   Deuce                8 mg = 2           

[facility]



     ne        3-10           [place]                mL,            l



               22:23:                               Injection,           [place]



               00                                 IV, Once,           



                                                  first dose           



                                                  03/10/16           



                                                  16:23:00           



                                                  CST, stop           



                                                  date           



                                                  03/10/16           



                                                  16:23:00           



                                                  CST            

 

     Misc            No   Deuce                1,000 mL,           [facility]

a



     Medication      3-10           [place]                Soln-IV,           l



               22:21:                               IV, Once,           [place]



               00                                 first dose           



                                                  03/10/16           



                                                  16:21:00           



                                                  CST, stop           



                                                  date           



                                                  03/10/16           



                                                  16:21:00           



                                                  CST            

 

     clindamycin            Yes  Deuce                928.125           M

[facility]



               3-10           [place]                mg,            l



               22:18:                               Soln-IV,           [facility]



               00                                 IV, Once,           



                                                  first dose           



                                                  03/10/16           



                                                  16:18:00           



                                                  CST, stop           



                                                  date           



                                                  03/10/16           



                                                  16:18:00           



                                                  CST            

 

     fentaNYL            No   Deuce                25 mcg =           Mem

oria



               3-10           [place]                0.5 mL,           l



               22:05:                               Injection,           [place]



               00                                 IV, Once,           



                                                  first dose           



                                                  03/10/16           



                                                  16:05:00           



                                                  CST, stop           



                                                  date           



                                                  03/10/16           



                                                  16:05:00           



                                                  CST            

 

     midazolam            No   Deuce                0.5 mg =           Me

moria



               3-10           [place]                0.5 mL,           l



               22:05:                               Injection,           [place]



               00                                 IV, Once,           



                                                  first dose           



                                                  03/10/16           



                                                  16:05:00           



                                                  CST, stop           



                                                  date           



                                                  03/10/16           



                                                  16:05:00           



                                                  CST            

 

     lidocaine            No   Deuce                3 mL,           [facility]

ia



               3-10           [place]                Injection,           l



               21:58:                               IV, Once,           [facility]



               00                                 first dose           



                                                  03/10/16           



                                                  15:58:00           



                                                  CST, stop           



                                                  date           



                                                  03/10/16           



                                                  15:58:00           



                                                  CST            

 

     propofol            No   Deuce                120 mg =           [facility]

[facility]



               3-10           Wheat                12 mL,           l



               21:58:                               Emulsion,           [place]



               00                                 IV, Once,           



                                                  first dose           



                                                  03/10/16           



                                                  15:58:00           



                                                  CST, stop           



                                                  date           



                                                  03/10/16           



                                                  15:58:00           



                                                  CST            

 

     midazolam            No   Deuce                0.5 mg =           [facility]

[facility]



               3-10           Wheat                0.5 mL,           l



               21:50:                               Injection,           [place]



               00                                 IV, Once,           



                                                  first dose           



                                                  03/10/16           



                                                  15:50:00           



                                                  CST, stop           



                                                  date           



                                                  03/10/16           



                                                  15:50:00           



                                                  CST            

 

     fentaNYL            No   Deuce                25 mcg =           [facility]

[facility]



               3-10           Wheat                0.5 mL,           l



               21:50:                               Injection,           [place]



               00                                 IV, Once,           



                                                  first dose           



                                                  03/10/16           



                                                  15:50:00           



                                                  CST, stop           



                                                  date           



                                                  03/10/16           



                                                  15:50:00           



                                                  CST            

 

     midazolam            No   Deuce                0.5 mg =           [facility]

[facility]



               3-10           Wheat                0.5 mL,           l



               21:10:                               Injection,           [place]



               00                                 IV, Once,           



                                                  first dose           



                                                  03/10/16           



                                                  15:10:00           



                                                  CST, stop           



                                                  date           



                                                  03/10/16           



                                                  15:10:00           



                                                  CST            

 

     fentaNYL            No   Deuce                25 mcg =           [facility]

[facility]



               3-10           Wheat                0.5 mL,           l



               21:10:                               Injection,           [place]



               00                                 IV, Once,           



                                                  first dose           



                                                  03/10/16           



                                                  15:10:00           



                                                  CST, stop           



                                                  date           



                                                  03/10/16           



                                                  15:10:00           



                                                  CST            

 

     fentaNYL            No   Deuce                25 mcg =           [facility]

[facility]



               3-10           Wheat                0.5 mL,           l



               21:05:                               Injection,           [place]



               00                                 IV, Once,           



                                                  first dose           



                                                  03/10/16           



                                                  15:05:00           



                                                  CST, stop           



                                                  date           



                                                  03/10/16           



                                                  15:05:00           



                                                  CST            

 

     midazolam            No   Deuce                0.5 mg =           [facility]

[facility]



               3-10           Wheat                0.5 mL,           l



               21:05:                               Injection,           [place]



               00                                 IV, Once,           



                                                  first dose           



                                                  03/10/16           



                                                  15:05:00           



                                                  CST, stop           



                                                  date           



                                                  03/10/16           



                                                  15:05:00           



                                                  CST            

 

     clindamycin            No   Yoan                900 mg, IV        

   [facility]



               3-10           Lei                Piggyback,           l



               20:00:                               Once,           [place]



               00                                 infuse           



                                                  over 30           



                                                  minutes,           



                                                  first dose           



                                                  03/10/16           



                                                  14:00:00           



                                                  CST, stop           



                                                  date           



                                                  03/10/16           



                                                  14:00:00           



                                                  CST,           



                                                  Prophylaxi           



                                                  s              

 

     LR 1,000 mL            No   Ghassan K                1,000 mL,          

 [facility]



               3-10           Chun                IV, 30           l



               19:27:                               mL/hr,           [place]



               00                                 start date           



                                                  03/10/16           



                                                  13:27:00           



                                                  CST            

 

     Lidocaine            No   Ghassan K                0.2 mL,           [facility]

[facility]



     2% 0.2 mL      3-10           Chun                Injection,           l



     IV Start      19:27:                               Subcutaneo           [facility]

[facility]



     [[facility]]      00                                 us, Once           



                                                  PRN for           



                                                  other (see           



                                                  comment),           



                                                  first dose           



                                                  03/10/16           



                                                  13:27:00           



                                                  CST            

 

     Seroquel            Yes                      100 mg,           [facility]

a



               3-09                               Oral,           l



               14:40:                               Daily, 0           [place]



               00                                 Refill(s),           



                                                  migraines           

 

     acetaminoph            Yes                      1 tabs,           [facility]

[facility]



     en-HYDROcod      3-09                               Oral,           l



     one 325      14:40:                               q6hr, 0           [place]



     mg-5 mg      00                                 Refill(s),           



     oral tablet                                         pain           

 

     traMADol 50      2016-0      Yes                      50 mg = 1           [facility]

[facility]



     mg oral      3-09                               tabs,           l



     tablet      14:40:                               Oral,           [place]



               00                                 q4hr, 0           



                                                  Refill(s),           



                                                  pain           

 

     amLODIPine-            Yes                      1 tabs,           [facility]

[facility]



     atorvastati      3-09                               Oral, qHS,           l



     n 5 mg-40      14:40:                               0              [place]



     mg oral      00                                 Refill(s),           



     tablet                                         cholestero           



                                                  l/hyperten           



                                                  alexx           

 

     Osteo            Yes                      Oral,           [facility]



     Bi-Flex      3-                               Daily, 0           l



               14:40:                               Refill(s),           [place]



               00                                 supplement           

 

     Nature's            Yes                      1,000 mg =           [facility]

[facility]



     Bounty Red      3-09                               2 caps,           l



     Krill Oil      14:40:                               Oral, BID,           [facility]

[facility]



     500 mg oral      00                                 0              



     capsule                                         Refill(s),           



                                                  supplement           

 

     aspirin 81            Yes                      81 mg = 1           [facility]

[facility]



     mg oral      3-09                               tabs,           l



     tablet      14:40:                               Oral,           [place]



               00                                 Daily, 0           



                                                  Refill(s),           



                                                  supplement           

 

     Axert 12.5            Yes                      12.5 mg =           [facility]

[facility]



     mg oral      3-09                               1 tabs,           l



     tablet      14:40:                               Oral,           [place]



               00                                 Once, PRN           



                                                  for            



                                                  migraine           



                                                  headache,           



                                                  may repeat           



                                                  dose once           



                                                  in 2           



                                                  hours, # 6           



                                                  tabs, 0           



                                                  Refill(s),           



                                                  migrainesm           



                                                  ay repeat           



                                                  dose once           



                                                  in 2 hours           

 

     Wellbutrin            Yes                      300 mg,           [facility]

[facility]



     XL        3-09                               Oral,           l



               14:40:                               q24hr, 0           [facility]



               00                                 Refill(s),           



                                                  migraines           







Immunizations







           Ordered Immunization Filled Immunization Date       Status     Commen

ts   Source



           Name       Name                                        

 

            influenza            2019-10-23 Completed             [facility]



           vaccine-unspecified<            00:00:00                         [facility]

d[facility]



           sup>1</sup>                                             

 

           pneumococcal            2019 Completed             [facility]



           23-valent             00:00:00                         [facility]



           vaccine<sup>3</sup>                                             

 

           Hx influenza            2011-10-25 Completed             [facility]



           vaccine-unspecified<            14:42:42                         [facility]

d[facility]



           sup>1</sup>                                             

 

           Hx influenza            2011-10-25 Completed             [facility]



           vaccine-unspecified<            14:42:42                         [facility]

d[facility]



           sup>2</sup>                                             







Vital Signs







             Vital Name   Observation Time Observation Value Comments     Source

 

             Heart Rate   2021 20:12:00                           [facility]

 [place]

 

             Heart Rate   2021 20:08:00                           [facility]

 [facility]

 

             Systolic (mm Hg) 2021 20:08:00                           [facility]

[facility] [place]

 

             Diastolic (mm Hg) 2021 20:08:00                           [facility]

[facility] [place]

 

             Height       2021 20:08:00 165.1 cm                  [facility]

 [place]

 

             Weight       2021 20:08:00                           [facility]

 [place]

 

             BMI Calculated 2021 20:08:00                           [facility]

[facility] [place]

 

             Systolic (mm Hg) 2020 15:59:00                           [facility]

[facility] [place]

 

             Diastolic (mm Hg) 2020 15:59:00                           [facility]

[facility] [place]

 

             Heart Rate   2020 15:59:00                           [facility]

 [place]

 

             Height       2020 15:59:00 165.1 cm                  [facility]

 [place]

 

             Weight       2020 15:59:00                           [facility]

 [place]

 

             BMI Calculated 2020 15:59:00                           [facility]

al [place]

 

             Systolic (mm Hg) 2020 20:42:00                           [facility]

[facility] [place]

 

             Diastolic (mm Hg) 2020 20:42:00                           [facility]

[facility] [place]

 

             Heart Rate   2020 20:42:00                           [facility]

 [place]

 

             Temperature Oral (F) 2020 20:42:00 98.2 F                    

[facility] [place]

 

             Height       2020 20:42:00 170.18 cm                 [facility]

 [place]

 

             Weight       2020 20:42:00                           [facility]

 [place]

 

             BMI Calculated 2020 20:42:00                           [facility]

al [place]

 

             Systolic (mm Hg) 2019 21:53:00                           [facility]

[facility] [place]

 

             Diastolic (mm Hg) 2019 21:53:00                           [facility]

[facility] [place]

 

             Heart Rate   2019 21:53:00                           [facility]

 [place]

 

             Temperature Oral (F) 2019 21:53:00 97.9 F                    

[facility] [place]

 

             Height       2019 21:53:00 165.1 cm                  [facility]

 [place]

 

             Weight       2019 21:53:00                           [facility]

 [place]

 

             BMI Calculated 2019 21:53:00                           [facility]

al [place]

 

             Height       2019-10-25 14:54:00 165.1 cm                  [facility]

 [place]

 

             Weight       2019-10-25 14:54:00                           [facility]

 [place]

 

             BMI Calculated 2019-10-25 14:54:00                           [facility]

al [place]

 

             Systolic (mm Hg) 2019-10-25 14:54:00                           [facility]

[facility] [place]

 

             Diastolic (mm Hg) 2019-10-25 14:54:00                           [facility]

[facility] [place]

 

             Heart Rate   2019-10-25 14:54:00                           [facility]

 [place]

 

             Temperature Oral (F) 2019-10-25 14:54:00 97.6 F                    

[facility] [place]

 

             Systolic (mm Hg) 2019-10-10 15:37:00                           [facility]

[facility] [place]

 

             Diastolic (mm Hg) 2019-10-10 15:37:00                           [facility]

[facility] [place]

 

             Heart Rate   2019-10-10 15:37:00                           [facility]

 [place]

 

             Temperature Oral (F) 2019-10-10 15:37:00 98.2 F                    

[facility]

 

             Height       2019-10-10 15:37:00 165.1 cm                  [facility]

 [place]

 

             Weight       2019-10-10 15:37:00                           [facility]

 [place]

 

             BMI Calculated 2019-10-10 15:37:00                           [facility]

al [place]

 

             Weight       2019 15:18:00                           [facility]

 [place]

 

             BMI Calculated 2019 15:18:00                           [facility]

al [place]

 

             Height       2019 15:18:00 165.1 cm                  [facility]

 [place]

 

             Temperature Oral (F) 2019 15:18:00 98.4 F                    

[facility]

 

             Heart Rate   2019 15:18:00                           [facility]

 [place]

 

             Systolic (mm Hg) 2019 15:18:00                           [facility]

[facility] [place]

 

             Diastolic (mm Hg) 2019 15:18:00                           [facility]

[facility] [place]

 

             Height       2019 19:16:00 165.1 cm                  [facility]

 [place]

 

             BMI Calculated 2019 19:16:00                           [facility]

al [place]

 

             Weight       2019 19:16:00                           [facility]

 [place]

 

             Heart Rate   2019 19:16:00                           [facility]

 [place]

 

             Systolic (mm Hg) 2019 19:16:00                           [facility]

[facility] [place]

 

             Diastolic (mm Hg) 2019 19:16:00                           [facility]

[facility] [place]

 

             Height       2019 14:26:00 167.01 cm                 [facility]

 [place]

 

             BMI Calculated 2019 14:26:00                           [facility]

al [place]

 

             Weight       2019 14:26:00                           [facility]

 [place]

 

             Heart Rate   2019 14:26:00                           [facility]

 [place]

 

             Temperature Oral (F) 2019 14:26:00 97.9 F                    

[facility] [place]

 

             Systolic (mm Hg) 2019 14:26:00                           [facility]

[facility] [place]

 

             Diastolic (mm Hg) 2019 14:26:00                           [facility]

[facility] [place]

 

             Systolic (mm Hg) 2018 18:33:00                           [facility]

[facility] [place]

 

             Diastolic (mm Hg) 2018 18:33:00                           [facility]

[facility] [place]

 

             Heart Rate   2018 18:33:00                           [facility]

 [place]

 

             Weight       2018 18:33:00                           [facility]

 [place]

 

             BMI Calculated 2018 18:31:00                           [facility]

al [place]

 

             Weight       2018 18:31:00                           [facility]

 [place]

 

             Systolic (mm Hg) 2018 18:31:00                           [facility]

[facility] [place]

 

             Diastolic (mm Hg) 2018 18:31:00                           [facility]

[facility] [place]

 

             Height       2018 18:31:00 170.18 cm                 [facility]

 [place]

 

             Temperature Oral (F) 2018 18:31:00 98.3 F                    

[facility] [place]

 

             Heart Rate   2018 18:31:00                           [facility]

 [place]

 

             Weight       2018 16:14:00                           [facility]

 [place]

 

             Height       2018 16:14:00 170.18 cm                 [facility]

 [place]

 

             BMI Calculated 2018 16:14:00                           [facility]

al [place]

 

             Heart Rate   2018 16:14:00                           [facility]

 [place]

 

             Systolic (mm Hg) 2018 16:14:00                           [facility]

[facility] [place]

 

             Diastolic (mm Hg) 2018 16:14:00                           [facility]

[facility] [place]

 

             BMI Calculated 2018 15:06:00                           [facility]

al [place]

 

             Height       2018 15:06:00 170.18 cm                 [facility]

 [place]

 

             Weight       2018 15:06:00                           [facility]

 [place]

 

             Systolic (mm Hg) 2018 15:06:00                           [facility]

[facility] [place]

 

             Diastolic (mm Hg) 2018 15:06:00                           [facility]

[facility] [place]

 

             Heart Rate   2018 15:06:00                           [facility]

 [place]

 

             Temperature Oral (F) 2018 15:06:00 97.9 F                    

[facility]

 

             Weight       2017 16:17:00                           [facility]

 [place]

 

             Height       2017 16:17:00 170.18 cm                 [facility]

 [place]

 

             BMI Calculated 2017 16:17:00                           [facility]

al [place]

 

             Respitory Rate 2016 01:25:00                           [facility]

al [place]

 

             Systolic (mm Hg) 2016 00:50:00                           [facility]

[facility] [place]

 

             Respitory Rate 2016 00:50:00                           [facility]

al [place]

 

             Heart Rate   2016 00:50:00                           [facility]

 [place]

 

             Systolic (mm Hg) 2016 00:40:00                           [facility]

[facility] [place]

 

             Respitory Rate 2016 00:40:00                           [facility]

al [place]

 

             Heart Rate   2016 00:40:00                           [facility]

 [place]

 

             Heart Rate   2016 00:30:00                           [facility]

 [place]

 

             Systolic (mm Hg) 2016 00:30:00                           [facility]

[facility] [place]

 

             Temperature Oral (F) 2016-03-10 23:50:00 37.1 Sherlyn                  

[facility] [place]

 

             Height       2016-03-10 19:23:00 169 cm                    [facility]

 [place]

 

             Weight       2016-03-10 19:23:00                           [facility]

 [place]

 

             Temperature Oral (F) 2016-03-10 19:23:00 36.6 Sherlyn                  

[facility]

 

             Height       2016 14:13:00 170 cm                    [facility]

 [place]

 

             Weight       2016 14:13:00                           [facility]

 [place]







Procedures







                Procedure       Date / Time Performed Performing Clinician [facility]

e

 

                Diabetic retinal eye 2020 06:00:00                 Dillan Mart



                exam<sup>1</sup>                                 

 

                Mammogram       2017-07-15 05:00:00                 Doreen hernandes

 

                Colonoscopy<sup>2</sup> 2017 06:00:00                 Hakeem

rial [place]

 

                OPEN REDUCTION INTERNAL 2016-03-10 22:13:00 Yoan Lei Mem[facility]

rial [place]



                FIXATION RADIUS                                 



                INTRA-ARTICULAR W/3                                 



                FRAGMENTS 55614                                 



                (Right)<sup>1</sup>                                 

 

                Repair of       2016-03-10 06:00:00                 Doreen hernandes



                wrist<sup>1</sup>                                 

 

                skin cancer removed from 2012 00:00:00                 [facility]

[facility] [place]



                arm                                             

 

                Skin cancer of                                  [facility]



                arm<sup>2</sup>                                 

 

                Procedure<sup>2</sup>                                 [facility]

ermann

 

                Tubal ligation                                  [facility]

 

                Eye operation                                   [facility]

 

                Biopsy of breast                                 [facility]

n

 

                bilateral radial                                 [facility]

n



                kerototomy                                      

 

                bilateral tubal ligation                                 [facility]

l [place]

 

                colonoscopy                                     [facility]







Encounters







        Start   End     Encounter Admission Attending Care    Care    Encounter 

Source



        Date/Time Date/Time Type    Type    Clinicians Facility Department ID   

   

 

        2021 Outpatient         [facility],  [facility]     [id number]

[address] [place]



        00:00:00 00:00:00                 RAZIUDDIN                 834     M[facility]

farhana



                                                                        

 

        2021 Between                 [facility] 3467

[id number] [facility]



        14:18:24 14:18:24 Visit                   r       Medicine 62      l



                                                        Rudy Hines

n

 

        2021 Between                 [facility] 3467

[id number] [facility]



        00:56:47 00:56:47 Visit                   r       Medicine 61      l



                                                        Rudy Hines

n

 

        2021 Outpatient                 [facility] 3

[id number] [facility]



        20:00:00 05:59:59                         r       Medicine 52      l



                                                        Grantshyanne Hines

n

 

        2021 Phone                   [facility] 3467

[id number] [facility]



        21:38:38 05:59:59 Message                 r       Medicine 13      l



                                                        Rudy Hines

n

 

        2021 Outpatient         ANABEL [facility]     [id number]

[address] [place]



        00:00:00 00:00:00                 DAYLIN                  513     Method

i



                                                                        

 

        2021-11-15 2021 Between                 [facility] 3467

[id number] [facility]



        15:33:59 15:33:59 Visit                   r       Medicine 59      l



                                                        Rudy Hines

n

 

        2021 2021-10-05 Inpatient         SOLIPURAM, [facility]     074     [id number]

[address] [place]



        00:00:00 00:00:00                 MELISSA                    329     Method

i



                                                                        

 

        2021 Outpatient         OPNANI, [facility]     2100

[id number] [place]



        00:00:00 00:00:00                 JULIANNA                 104     Method

i



                                                                        

 

        2021 Outpatient         [facility]     [id number]

[address] [place]



        00:00:00 00:00:00                 RAZIUDDIN                 273     [facility]

farhana



                                                                        

 

        2021 Outpatient         EKERUO, [facility]     [id number]

[address] [place]



        00:00:00 00:00:00                 DAYLIN                  787     Method

i



                                                                        

 

        2021 Outpatient         DAOURA, [facility]     [id number]

[address] [place]



        00:00:00 00:00:00                 MAGY                 546     Method

i



                                                                        

 

        2021 Inpatient         MATHIVANAN, [facility]     064     [id number]

[id number] [place]



        00:00:00 00:00:00                 MELVIN                   275     Method

i



                                                                        

 

        2021 Outpatient         AHMED,  [facility]     [id number]

068 [place]



        00:00:00 00:00:00                 RAZIUDDIN                 199     M[facility]

farhana



                                                                        

 

        2021 Outpatient         EKERUO, [facility]     021     [id number]

[address] [place]



        00:00:00 00:00:00                 DAYLIN                  640     Method

i



                                                                        

 

        2021 Outpatient         EKERUO, [facility]     [id number]

[address] [place]



        00:00:00 00:00:00                 DAYLIN                  435     Method

i



                                                                        

 

        2021 Outpatient         EKERUO, [facility]     [id number]

[address] [place]



        00:00:00 00:00:00                 DAYLIN                  537     Method

i



                                                                        

 

        2021 Outpatient         EKERUO, [facility]     [id number]

029 [place]



        00:00:00 00:00:00                 DAYLIN                  709     Method

i



                                                                        

 

        2021 Inpatient         SOLIPURAM, [facility]     064     [id number]

86180 [place]



        00:00:00 00:00:00                 MELISSA                    685     Method

i



                                                                        

 

        2021 Outpatient         AHMED,  [facility]     [id number]

046 [place]



        00:00:00 00:00:00                 RAZIUDDIN                 101     [facility]

farhana



                                                                        

 

        2021 Outpatient                 [facility] 3467

[id number] [facility]



        01:00:00 04:59:00                         r       [place] 58      l



                                                        [place]         [place]



 

        2021 Outpatient         AHMED,  [facility]    PUL     [id number]   

 [facility]



        20:00:00 23:59:00                 RAZIUDDIN                         

 

        2021 Outpatient         DANIELLA,  [facility]     [id number]

[address] [place]



        00:00:00 00:00:00                 RAZIUDDIN                 598     [facility]

farhana



                                                                        

 

        2021-04-15 2021 Inpatient         ANAIS, [facility]     064     [id number]

[id number] [place]



        00:00:00 00:00:00                 MELVIN                   060     Method

i



                                                                        

 

        2021 Outpatient                 [facility]     [id number]

[address] [place]



        00:00:00 00:00:00                                         470     Method

i



                                                                        st

 

        2021 Outpatient                 [facility] 3

[id number] [facility]



        19:30:00 04:59:59                         r       Medicine 51      l



                                                        Rudy Hines

n

 

        2021 Outpatient                 [facility]     [id number]

[address] [place]



        00:00:00 00:00:00                                         398     Method

i



                                                                        

 

        2021 Between                 [facility] 3467

[id number] [facility]



        13:38:03 13:38:03 Visit                   r       Medicine 57      l



                                                        Rudy Hines

n

 

        2021 Outpatient         DANIELLA,  [facility]     [id number]

[address] [place]



        00:00:00 00:00:00                 RAZIUDDIN                 554     [facility]

farhana



                                                                        

 

        2021 [facility]     2100

[id number] [place]



        00:00:00 00:00:00                 MELVIN                   541     Method

i



                                                                        

 

        2021 Inpatient         ANAIS, [facility]     Ayse     2100

[id number] [place]



        00:00:00 00:00:00                 MELVIN                   559     Method

i



                                                                        

 

        2020 Inpatient         ANAIS, [facility]     012     

[id number] [place]



        00:00:00 00:00:00                 MELVIN                   271     Method

i



                                                                        

 

        2020 Outpatient                 [facility] 3

[id number] [facility]



        16:30:00 05:59:59                         r       Medicine 50      l



                                                        Rudy martinez

 

        2020 Inpatient         SOLIPURAM, [facility]     012     [id number]

[address] [place]



        00:00:00 00:00:00                 MELISSA                    464     Method

i



                                                                        

 

        2020-10-23 2020 Inpatient         TONO [facility]     012     [id number]

[id number] [place]



        00:00:00 00:00:00                 MELISSA                    557     Method

i



                                                                        

 

        2020-10-23 2020-10-24 Outpt Diag                 nullFlavo [facility]    [id number]

[id number] [facility]



        18:10:00 04:59:00 Services                 r       Outpatient 06      l



                                                        Imaging         [place]



                                                        [place]         

 

        2020-10-21 2020-10-22 Between                 [facility] 3467

[id number] [facility]



        17:58:19 17:58:19 Visit                   r       Medicine 56      l



                                                        Rudy Hines

n

 

        2020-10-13 2020-10-14 Outpatient                 [facility] 3

[id number] [facility]



        15:15:00 04:59:59                         r       Medicine 49      l



                                                        Rudy Hines

n

 

        2020 Outpt Diag                 nullFlavo [facility]    [id number]

[id number] [facility]



        17:02:00 04:59:00 Services                 r       Outpatient 05      l



                                                        Imaging         [place]



                                                        [place]         

 

        2020 Ambulatory                 [facility]    [id number]

[id number] [facility]



        19:00:00 19:00:00 Pre-Reg                 r       Nephrology 46      l



                                                        Rudy Hines

n

 

        2020 Outpatient                 [facility]    [id number]

365 [facility]



        11:15:00 11:15:00                                         47      l



                                                                        [place]

 

        2020 Outpatient                 [facility]    [id number]

[id number] [facility]



        19:45:00 04:59:59                         r       Nephrology 48      l



                                                        Rudy Hines

n

 

        2020 Outpatient         MANGIN, [facility]     [id number]

[address] [place]



        00:00:00 00:00:00                 EMILIA                    832     Method

i



                                                                        

 

        2020 Phone                   [facility]    [id number]

55 [facility]



        16:17:50 04:59:59 Message                 r       Nephrology 12      l



                                                        Rudy Hines

n

 

        2020 Outpatient                 [facility]    [id number]

[id number] [facility]



        19:00:00 04:59:59                         r       Nephrology 41      l



                                                        Rudy Hines

n

 

        2020 Phone                   [facility]    [id number]

55 [facility]



        18:35:51 04:59:59 Message                 r       Nephrology 11      dennis Arguello         [place]

 

        2020 Outpatient                 [facility] 3

[id number] [facility]



        15:15:00 04:59:59                         r       Medicine 45      dennis Hines

michelle

 

        2020 Ambulatory                 [facility] 3

[id number] [facility]



        15:15:00 15:15:00 Pre-Reg                 r       Medicine 44      l



                                                        Rudy Hines

michelle

 

        2020 Phone                   null[facility]    [id number]

55 [facility]



        13:23:32 04:59:59 Message                 r       Nephrology 10      dennis Hines

michelle

 

        2020 Between                 [facility] 3467

[id number] [facility]



        14:14:54 14:14:54 Visit                   r       Medicine 52      dennis Rosariosylvie martinez

 

        2020 Outpatient                 [facility]    [facility]    [id number]

365 [facility]



        11:30:00 11:30:00                                         43      dennis



                                                                        [place]

 

        2020 2020-04-15 Phone                   [facility] 3467

[id number] [facility]



        20:00:15 04:59:59 Message                 r       Medicine 09      dennis Hines

michelle

 

        2020-04-10 2020 Phone                   [facility] 3467

[id number] [facility]



        17:18:28 04:59:59 Message                 r       Medicine 08      dennis Hines

michelle

 

        2020-04-10 2020 Phone                   [facility]    [id number]

55 [facility]



        13:26:55 04:59:59 Message                 r       Nephrology 07      dennis Hines

michelle

 

        2020 Outpatient                 [facility] 3

[id number] [facility]



        20:15:00 05:59:59                         r       Medicine 42      dennis Rosariosylvie martinez

 

        2020 Phone                   [facility] 3467

[id number] [facility]



        14:22:27 05:59:59 Message                 r       Medicine 06      l



                                                        Rudy Rosariosylvie martinez

 

        2019 Phone                   [facility] 3467

[id number] [facility]



        16:06:04 05:59:59 Message                 r       Medicine 05      dennis martinez

 

        2019 Between                 [facility]    [id number]

75 [facility]



        20:05:03 20:05:03 Visit                   r       Nephrology 45      dennis Mart

 

        2019 Outpatient                 [facility]    [id number]

[id number] [facility]



        20:45:00 05:59:59                         r       Nephrology 38      dennis martinez

 

        2019 Between                 [facility]    [id number]

75 [facility]



        00:07:10 00:07:10 Visit                   r       Nephrology 43      dennis Mart

 

        2019 Outpatient                 [facility]    [facility]    [id number]

365 [facility]



        09:00:00 09:00:00                                         39      dennis Mart

 

        2019 Between                 [facility] 3467

[id number] [facility]



        21:47:12 21:47:12 Visit                   r       Medicine 41      dennis Hines

michelle

 

        2019-10-29 2019-10-30 Between                 [facility] 3467

[id number] [facility]



        22:13:13 22:13:13 Visit                   r       Medicine 40      dennis martinez

 

        2019-10-25 2019-10-26 Outpatient                 [facility] 3

[id number] [facility]



        14:45:00 04:59:59                         r       Medicine 40      dennis martinez

 

        2019-10-17 2019-10-17 Ambulatory                 [facility] 3

[id number] [facility]



        16:00:00 16:00:00 Pre-Reg                 r       Medicine 36      dennis Hines

michelle

 

        2019-10-10 2019-10-11 Outpatient                 [facility]    [id number]

[id number] [facility]



        15:00:00 04:59:59                         r       Nephrology 35      dennis martinez

 

        2019-10-10 2019-10-10 Outpatient                 [facility]    IE    [id number]

365 [facility]



        12:00:00 12:00:00                                         37      dennis Mart

 

        2019 Phone                   [facility] 3467

[id number] [facility]



        15:15:06 04:59:59 Message                 r       Medicine 04      dennis Hines

michelle

 

        2019 Phone                   [facility]    [id number]

55 [facility]



        15:10:51 04:59:59 Message                 r       Nephrology 03      dennis martinez

 

        2019 Ambulatory                 [facility]    [id number]

[id number] [facility]



        20:00:00 20:00:00 Pre-Reg                 r       Nephrology 34      dennis martinez

 

        2019 Between                 [facility]    [id number]

75 [facility]



        20:15:16 20:15:16 Visit                   r       Nephrology 34      dennis Rosarioann

 

        2019 Phone                   [facility]    [id number]

55 [facility]



        15:29:54 04:59:59 Message                 r       Nephrology 02      dennis Mart

 

        2019 Ambulatory                 [facility]avo [facility]    [id number]

[id number] [facility]



        16:30:00 16:30:00 Pre-Reg                 r       Nephrology 29      dennis martinez

 

        2019 Ambulatory                 null[facility]    [id number]

[id number] [facility]



        16:15:00 16:15:00 Pre-Reg                 r       Nephrology 30      dennis martinez

 

        2019 Ambulatory                 [facility] 3

[id number] [facility]



        15:15:00 15:15:00 Pre-Reg                 r       Medicine 31      dennis martinez

 

        2019 Inpatient [facility]     TELE    [id number]

69 [place]



        10:05:00 17:55:00                 GOPAL                         Medica

[facility]

 

        2019 Outpatient [facility]     TELE    [id number]

[address] [place]



        21:36:00 19:00:00                 GOPAL                         Medica

[facility]

 

        2019 Outpatient                 [facility] 346

[id number] [facility]



        20:40:00 04:59:59                         r       Care    33      dennis Rosarioann

 

        2019 Outpatient                 [facility] 3

[id number] [facility]



        15:15:00 04:59:59                         r       Medicine 32      dennis martinez

 

        2019 Between                 [facility] 3467

[id number] [facility]



        19:00:16 19:00:16 Visit                   r       Medicine 29      dennis Hines

michelle

 

        2019 2019-03-15 Between                 [facility]    [id number]

75 [facility]



        15:02:37 15:02:37 Visit                   r       Nephrology 27      l



                                                        Rudy Hines

n

 

        2019 2019-03-15 Outpatient                 [facility]    [id number]

[id number] [facility]



        18:45:00 04:59:59                         r       Nephrology 28      l



                                                        Rudy martinez

 

        2019 2019-03-15 Outpatient                 [facility] 3

[id number] [facility]



        14:15:00 04:59:59                         r       Medicine 22      l



                                                        Rudy Hines

michelle

 

        2019 Between                 [facility]avo [facility]    [id number]

75 [facility]



        23:59:47 23:59:47 Visit                   r       Nephrology 26      l



                                                        Rudy Hines

michelle

 

        2019 Between                 [facility]    [id number]

75 [facility]



        22:00:33 22:00:33 Visit                   r       Nephrology 24      dennis Hines

michelle

 

        2019 Between                 null[facility]avo [facility]    [id number]

75 [facility]



        04:48:44 04:48:44 Visit                   r       Nephrology 23      l



                                                        Rudy Hines

michelle

 

        2019 Ambulatory                 [facility]    [id number]

[id number] [facility]



        20:30:00 20:30:00 Pre-Reg                 r       Nephrology 27      dennis Hines

michelle

 

        2019 Ambulatory                 [facility]    [id number]

[id number] [facility]



        18:40:00 18:40:00 Pre-Reg                 r       Nephrology 24      dennis Hines

michelle

 

        2019 Ambulatory                 [facility]    [id number]

[id number] [facility]



        15:30:00 15:30:00 Pre-Reg                 r       Internal 25      dennis Grant         

 

        2018 2018-10-20 Ambulatory                 [facility]    [id number]

[id number] [facility]



        12:45:00 12:45:00 Pre-Reg                 r       Internal 23      dennis Grant         

 

        2018 Outpatient                 nullFlavo [facility]    [id number]

[id number] [facility]



        19:15:00 04:59:59                         r       Nephrology 26      l



                                                        Rudy Hines

michelle

 

        2018 Outpatient                 nullFlavo [facility]    [id number]

[id number] [facility]



        18:00:00 04:59:59                         r       Nephrology 21      l



                                                        Rudy         Donny martinez

 

        2018 Outpatient                 nullFlavo [facility]

[id number] [facility]



        18:30:00 04:59:59                         r       Medicine 20      l



                                                        Rudy Hines

n

 

        2018 Outpatient                 nullFlavo [facility]    [id number]

[id number] [facility]



        16:00:00 04:59:59                         r       Nephrology 19      dennis Hines

n

 

        2018 Outpatient                 nullFlavo [facility]

[id number] [facility]



        15:00:00 04:59:59                         r       Medicine 18      l



                                                        Rudy Hines

n

 

        2017 Outpatient                 [facility]    [id number]

365 [facility]



        10:40:00 10:40:00                                         16      dennis Mart

 

        2017 Outpatient                 [facility]    [id number]

365 [facility]



        11:30:00 11:30:00                                         17      dennis Mart

 

        2017 Outpatient                 [facility]    [id number]

365 [facility]



        11:40:00 11:40:00                                         11      dennis Mart

 

        2017 Outpatient                 [facility]    [id number]

365 [facility]



        14:30:00 14:30:00                                         15      dennis Mart

 

        2017 Outpatient                 [facility]    [id number]

365 [facility]



        10:00:00 10:00:00                                         08      dennis Mart

 

        2017 Bedded                  nullFlavo [facility] [id number]

375 [facility]



        11:48:35 14:30:00 Outpatient                 xuan Mart 13      l



                                                        Jyoti Harper



 

        2017 Outpatient                 [facility]    [id number]

365 [facility]



        13:15:00 13:15:00                                         14      dennis Mart

 

        2016 Outpatient                 [facility]    [id number]

365 [facility]



        09:30:00 09:30:00                                         12      dennis Mart

 

        2016 Outpatient                 [facility]    [id number]

365 [facility]



        09:30:00 09:30:00                                         13      dennis Mart

 

        2016 Outpatient                 [facility]    [id number]

365 [facility]



        10:20:00 10:20:00                                         09      dennis Mart

 

        2016 Outpatient                 [facility]    [id number]

365 [facility]



        13:00:00 13:00:00                                         04      l



                                                                        [place]

 

        2016 Outpatient                 [facility]    [id number]

365 [facility]



        11:15:00 11:15:00                                         06      dennis



                                                                        [place]

 

        2016 OP Therapy                 nullFlavo [facility]     [id number]

[id number] [facility]



        13:00:00 04:59:00 Patients                 r       [place] 03      l



                                                        Jl Mart

 

        2016 OP Therapy                 nullFlavo [facility]     [id number]

[id number] [facility]



        17:39:00 04:59:00 Patients                 r       Regulo 02      l



                                                        Trace           [place]

 

        2016 OP Therapy                 nullFlavo [facility] [place]

[id number] [facility]



        19:00:00 04:59:00 Patients                 r       Tangirnaq   01      dennis



                                                                        [place]

 

        2016 Outpt Diag                 nullFlavo [facility]    [id number]

[id number] [facility]



        16:14:00 04:59:00 Services                 r       Outpatient 04      l



                                                        Imaging         [place]



                                                        [place]         

 

        2016 OP Therapy                 nullFlavo [facility] [place]

[id number] [facility]



        19:00:00 04:59:00 Patients                 r       [place]   00      dennis



                                                                        [place]

 

        2016 [facility]    [id number]

365 [facility]



        10:20:00 10:20:00                                         01      dennis



                                                                        [place]

 

        2016-05-10 2016 Outpt Diag                 nullFlavo [facility]    [id number]

[id number] [facility]



        14:59:00 04:59:00 Services                 r       Outpatient 03      l



                                                        Imaging         [place]



                                                        Tessa            

 

        2016 Outpt Diag                 nullFlavo [facility]    [id number]

[id number] [facility]



        18:11:00 04:59:00 Services                 r       Outpatient 01      l



                                                        Imaging         [place]



                                                        [place]         

 

        2016-03-10 2016-03-10 Outpatient                 nullFlavo [facility]    [id number]

   [facility]



        12:50:31 19:25:00                         r                       dennis



                                                                        [place]

 

        2016 2016-02-10 Outpt Diag                 nullFlavo [facility]    [id number]

[id number] [facility]



        12:58:00 05:59:00 Services                 r       Outpatient 00      l



                                                        Imaging         [place]



                                                        [place]         

 

        2016 Outpatient                 [facility]    [id number]

365 [facility]



        10:15:00 10:15:00                                         02      l



                                                                        [place]

 

        2015 Outpatient                 [facility]    [id number]

365 [facility]



        11:30:00 11:30:00                                         00      l



                                                                        [place]

 

        2014 Outpatient                 [facility] 3467

[id number] Memoria



        01:16:00 05:59:00                         r       [place] [id number] l



                                                        [place] 1       [place]

nn

 

        2014 [facility]      [id number]

[id number] [facility]



        19:16:00 19:16:00                         r       [place] 01      l



                                                                        [place]

 

        2014 [facility]      [id number]

[id number] [facility]



        19:43:00 19:43:00                         r       [place] 00      l



                                                                        [place]







Results







           Test Description Test Time  Test Comments Results    Result Comments 

Source









                          SARS-CoV-2 (COVID-19) RNA [Presence] in Respiratory sp

ecimen by 2021 

22:34:30                                



                    EDWARD with probe detection                     









                      Test Item  Value      Reference Range Interpretation Comme

nts









             SARS-CoV-2 (COVID-19) RNA [Presence] in Respiratory Not detected No

t-Detected              



             specimen by EDWARD with probe detection (test code =                  

                      



             08901-8)                                            

 

             Whether patient is employed in a healthcare setting                

                        



             (test code = 80744-0)                                        

 

             Whether the patient has symptoms related to condition              

                          



             of interest (test code = 95323-1)                                  

      

 

             Patient was hospitalized because of this condition                 

                       



             (test code = 57717-2)                                        

 

             Whether the patient was admitted to intensive care unit            

                            



             (ICU) for condition of interest (test code = 63671-9)              

                          

 

             Whether patient resides in a congregate care setting               

                         



             (test code = 97236-5)                                        



SARS-CoV-2 (COVID-19) RNA [Presence] in Respiratory specimen by EDWARD with probe 
[id number] 22:35:42





             Test Item    Value        Reference Range Interpretation Comments

 

             SARS-CoV-2 (COVID-19) RNA Not detected Not-Detected              



             [Presence] in Respiratory                                        



             specimen by EDWARD with probe                                        



             detection (test code = 25334-2)                                    

    

 

             Whether patient is employed in a                                   

     



             healthcare setting (test code =                                    

    



             26621-8)                                            

 

             Whether the patient has symptoms                                   

     



             related to condition of interest                                   

     



             (test code = 07107-1)                                        

 

             Patient was hospitalized because                                   

     



             of this condition (test code =                                     

   



             47626-8)                                            

 

             Whether the patient was admitted                                   

     



             to intensive care unit (ICU) for                                   

     



             condition of interest (test code                                   

     



             = 91562-1)                                          

 

             Whether patient resides in a                                       

 



             congregate care setting (test                                      

  



             code = 41594-7)                                        



SARS-CoV-2 (COVID-19) RNA [Presence] in Respiratory specimen by EDWARD with probe 
[id number] 22:46:10





             Test Item    Value        Reference Range Interpretation Comments

 

             SARS-CoV-2 (COVID-19) RNA Not detected Not-Detected              



             [Presence] in Respiratory                                        



             specimen by EDWARD with probe                                        



             detection (test code = 89257-1)                                    

    

 

             Whether patient is employed in a                                   

     



             healthcare setting (test code =                                    

    



             16674-2)                                            

 

             Whether the patient has symptoms                                   

     



             related to condition of interest                                   

     



             (test code = 66379-7)                                        

 

             Patient was hospitalized because                                   

     



             of this condition (test code =                                     

   



             95732-8)                                            

 

             Whether the patient was admitted                                   

     



             to intensive care unit (ICU) for                                   

     



             condition of interest (test code                                   

     



             = 34138-4)                                          

 

             Whether patient resides in a                                       

 



             congregate care setting (test                                      

  



             code = 31019-0)                                        



SARS-CoV-2 (COVID-19) RNA [Presence] in Respiratory specimen by EDWARD with probe 
[id number] 01:54:24





             Test Item    Value        Reference Range Interpretation Comments

 

             SARS-CoV-2 (COVID-19) RNA Not detected Not-Detected              



             [Presence] in Respiratory                                        



             specimen by EDWARD with probe                                        



             detection (test code = 24656-7)                                    

    

 

             Whether patient is employed in a                                   

     



             healthcare setting (test code =                                    

    



             05633-5)                                            

 

             Whether the patient has symptoms                                   

     



             related to condition of interest                                   

     



             (test code = 78308-2)                                        

 

             Patient was hospitalized because                                   

     



             of this condition (test code =                                     

   



             98949-3)                                            

 

             Whether the patient was admitted                                   

     



             to intensive care unit (ICU) for                                   

     



             condition of interest (test code                                   

     



             = 44743-8)                                          

 

             Whether patient resides in a                                       

 



             congregate care setting (test                                      

  



             code = 03180-4)                                        



SARS-CoV-2 (COVID-19) RNA [Presence] in Respiratory specimen by EDWARD with probe 
[id number] 21:44:06





             Test Item    Value        Reference Range Interpretation Comments

 

             SARS-CoV-2 (COVID-19) RNA Not detected Not-Detected              



             [Presence] in Respiratory                                        



             specimen by EDWARD with probe                                        



             detection (test code = 31901-6)                                    

    

 

             Whether patient is employed in a                                   

     



             healthcare setting (test code =                                    

    



             92196-0)                                            

 

             Whether the patient has symptoms                                   

     



             related to condition of interest                                   

     



             (test code = 71975-3)                                        

 

             Patient was hospitalized because                                   

     



             of this condition (test code =                                     

   



             45411-6)                                            

 

             Whether the patient was admitted                                   

     



             to intensive care unit (ICU) for                                   

     



             condition of interest (test code                                   

     



             = 08939-0)                                          

 

             Whether patient resides in a                                       

 



             congregate care setting (test                                      

  



             code = 18362-6)                                        



SARS-CoV-2 (COVID-19) RNA [Presence] in Respiratory specimen by EDWARD with probe 
[id number] 00:36:59





             Test Item    Value        Reference Range Interpretation Comments

 

             SARS-CoV-2 (COVID-19) RNA Not detected Not-Detected              



             [Presence] in Respiratory                                        



             specimen by DEWARD with probe                                        



             detection (test code = 11884-5)                                    

    



SARS-CoV-2 (COVID-19) RNA [Presence] in Respiratory specimen by EDWARD with probe 
[id number] 17:35:35





             Test Item    Value        Reference Range Interpretation Comments

 

             SARS-CoV-2 (COVID-19) RNA Not detected Not-Detected              



             [Presence] in Respiratory                                        



             specimen by EDWARD with probe                                        



             detection (test code = 00088-5)                                    

    



SARS-CoV-2 (COVID-19) RNA [Presence] in Respiratory specimen by EDWARD with probe 
[id number] 18:03:30





             Test Item    Value        Reference Range Interpretation Comments

 

             SARS-CoV-2 (COVID-19) RNA Not detected Not-Detected              



             [Presence] in Respiratory                                        



             specimen by EDWARD with probe                                        



             detection (test code = 84491-7)                                    

    



SARS-CoV-2 (COVID-19) RNA [Presence] in Respiratory specimen by EDWARD with probe 
[id number] 10:06:03





             Test Item    Value        Reference Range Interpretation Comments

 

             SARS-CoV-2 (COVID-19) RNA Not detected Not-Detected              



             [Presence] in Respiratory                                        



             specimen by EDWARD with probe                                        



             detection (test code = 00690-5)                                    

    



SARS-CoV-2 (COVID-19) RNA [Presence] in Respiratory specimen by EDWARD with probe 
[id number] 05:11:58





             Test Item    Value        Reference Range Interpretation Comments

 

             SARS-CoV-2 (COVID-19) RNA Not detected Not-Detected              



             [Presence] in Respiratory                                        



             specimen by EDWARD with probe                                        



             detection (test code = 34826-0)                                    

    



SARS-CoV-2 (COVID-19) RNA [Presence] in Respiratory specimen by EDWARD with probe 
[id number] 03:34:16





             Test Item    Value        Reference Range Interpretation Comments

 

             SARS-CoV-2 (COVID-19) RNA Not detected Not-Detected              



             [Presence] in Respiratory                                        



             specimen by EDWARD with probe                                        



             detection (test code = 40823-3)                                    

    



SARS-CoV-2 (COVID-19) RNA [Presence] in Respiratory specimen by EDWARD with probe 
[id number] 10:06:41





             Test Item    Value        Reference Range Interpretation Comments

 

             SARS-CoV-2 (COVID-19) RNA Not detected Not-Detected              



             [Presence] in Respiratory                                        



             specimen by EDWARD with probe                                        



             detection (test code = 18963-5)                                    

    



LIPIDS2020-10-14 12:33:00





             Test Item    Value        Reference Range Interpretation Comments

 

             Chol (test code = Chol) 129                                    



[facility]





             Test Item    Value        Reference Range Interpretation Comments

 

             HDL (test code = HDL) 37                                     



[facility] 12:33[facility]00





             Test Item    Value        Reference Range Interpretation Comments

 

             Trig (test code = Trig) 76                                     



[facility] 12:33[facility]





             Test Item    Value        Reference Range Interpretation Comments

 

             LDL (Calculated) (test code = LDL 76                               

      



             (Calculated))                                        



[facility] 12:33[facility]





             Test Item    Value        Reference Range Interpretation Comments

 

             CHD Risk (test code = CHD Risk) 3.5                                

    



[facility] 12:33[facility]





             Test Item    Value        Reference Range Interpretation Comments

 

             Non HDL Chol (test code = Non HDL Chol) 92                         

            



[facility] [id number] 14:47:00





             Test Item    Value        Reference Range Interpretation Comments

 

             Vitamin D, 25-OH, Total (test code = 37                      

         



             Vitamin D, 25-OH, Total)                                        



[facility] [id number] 14:47:00





             Test Item    Value        Reference Range Interpretation Comments

 

             U Creat mg/dL (test code = U Creat 114                       

       



             mg/dL)                                              



[facility] [id number] 14:47:00





             Test Item    Value        Reference Range Interpretation Comments

 

             U Prot/Creat (test code = U Prot/Creat) 430                  

            



[facility] [id number] 14:47:00





             Test Item    Value        Reference Range Interpretation Comments

 

             U Prot/Creat (test code = U 0.430 1      0.021-0.161               



             Prot/Creat)                                         



[place] [id number] 14:47:00





             Test Item    Value        Reference Range Interpretation Comments

 

             U Protein (test code = U Protein) 49           5-24                

      



[place] [id number] 14:47:00





             Test Item    Value        Reference Range Interpretation Comments

 

             Glucose Lvl (test code = Glucose Lvl) 103          65-99           

          



[place] [id number] 14:47:00





             Test Item    Value        Reference Range Interpretation Comments

 

             BUN (test code = BUN) 24           7-25                      



[place] [id number] 14:47:00





             Test Item    Value        Reference Range Interpretation Comments

 

             Creatinine Lvl (test code = Creatinine 1.32         0.50-0.99      

           



             Lvl)                                                



[place] 725870-08-06 14:47:00





             Test Item    Value        Reference Range Interpretation Comments

 

             eGFR NON-AFR. AMERICAN (test code = 43                             

        



             eGFR NON-AFR. AMERICAN)                                        



[facility]2020-08-06 14:47:00





             Test Item    Value        Reference Range Interpretation Comments

 

             eGFR  (test code = eGFR 50                         

            



             )                                        



[place] 725870-08-06 14:47:00





             Test Item    Value        Reference Range Interpretation Comments

 

             B/C Ratio (test code = B/C Ratio) 18           6-22                

      



[place] 725870-08-06 14:47:00





             Test Item    Value        Reference Range Interpretation Comments

 

             Sodium Lvl (test code = Sodium Lvl) 138          135-146           

        



[place] 725870-08-06 14:47:00





             Test Item    Value        Reference Range Interpretation Comments

 

             Potassium Lvl (test code = Potassium 4.4          3.5-5.3          

         



             Lvl)                                                



[place] 725870-08-06 14:47:00





             Test Item    Value        Reference Range Interpretation Comments

 

             Chloride Lvl (test code = Chloride Lvl) 102                  

            



[place] 725870-08-06 14:47:00





             Test Item    Value        Reference Range Interpretation Comments

 

             CO2 (test code = CO2) 30           20-32                     



[place] 725870-08-06 14:47:00





             Test Item    Value        Reference Range Interpretation Comments

 

             Calcium Lvl (test code = Calcium Lvl) 9.4          8.6-10.4        

          



[place] 725870-08-06 14:47:00





             Test Item    Value        Reference Range Interpretation Comments

 

             Phosphorus (test code = Phosphorus) 4.8          2.5-4.5           

        



[facility]2020-08-06 14:47:00





             Test Item    Value        Reference Range Interpretation Comments

 

             Albumin Lvl (test code = Albumin Lvl) 3.9          3.6-5.1         

          



[facility]3567[facility]47[facility]00





             Test Item    Value        Reference Range Interpretation Comments

 

             Plt Count Estimated (test code = DECREASED                         

     



             Plt Count Estimated)                                        



[facility] HospitalWidbnhuAMHXUPDMEJ7158-66-86 14:47:00





             Test Item    Value        Reference Range Interpretation Comments

 

             WBC X 10x3 (test code = WBC X 10x3) 7.2          3.8-10.8          

        



[facility] HospitalWcyyypzQSLQZCMNAU9503-05-70 14:47:00





             Test Item    Value        Reference Range Interpretation Comments

 

             RBC X 10x6 (test code = RBC X 10x6) 3.71         3.80-5.10         

        



[facility] HospitalDcjfewkZSNBAGRHJD7243-92-80 14:47:00





             Test Item    Value        Reference Range Interpretation Comments

 

             Hgb (test code = Hgb) 10.7         11.7-15.5                 



[facility] HospitalYhtbzrsYRDJYSQUQD7515-68-38 14:47:00





             Test Item    Value        Reference Range Interpretation Comments

 

             Hct (test code = Hct) 33.9         35.0-45.0                 



[facility] HospitalBkjtacaCZMZJLXOMR9566-15-28 14:47:00





             Test Item    Value        Reference Range Interpretation Comments

 

             MCV (test code = MCV) 91.4         80.0-100.0                



[facility] HospitalHzfxibnRFHCBLKOSW7278-73-08 14:47:00





             Test Item    Value        Reference Range Interpretation Comments

 

             MCH (test code = MCH) 28.8 pg      27.0-33.0                 



[facility] HospitalNnarimgIDWVPPDWDQ3258-67-98 14:47:00





             Test Item    Value        Reference Range Interpretation Comments

 

             MCHC (test code = MCHC) 31.6         32.0-36.0                 



[place] 315230-08-06 14:47:00





             Test Item    Value        Reference Range Interpretation Comments

 

             RDW (test code = RDW) 13.9         11.0-15.0                 



[facility] HospitalYmkmrkjSPQVYPNKIF2345-24-44 14:47:00





             Test Item    Value        Reference Range Interpretation Comments

 

             Platelet (test code = Platelet) 110          140-400               

    



[facility] HospitalQiyvwqxWGRHMFTBLE3115-52-83 14:47:00





             Test Item    Value        Reference Range Interpretation Comments

 

             MPV (test code = MPV) 11.7         7.5-12.5                  



[facility]iv[facility]lqTCPNGWAWWA2490[facility] 14[facility]47[facility]





             Test Item    Value        Reference Range Interpretation Comments

 

             Neutrophils # (test code = Neutrophils 5414         6546-6846      

           



             #)                                                  



[facility] MichiganZrjhbsiLIBBRVEMWC9250-36-04 14:47:00





             Test Item    Value        Reference Range Interpretation Comments

 

             Lymphocytes # (test code = Lymphocytes 1152         850-3900       

           



             #)                                                  



[facility] CenterZaipfouSDEZLBODFP1824-51-89 14:47:00





             Test Item    Value        Reference Range Interpretation Comments

 

             Monocytes # (test code = Monocytes #) 461          200-950         

          



[facility] CenterCdlaveaLUJBRJGGZB4711-92-14 14:47:00





             Test Item    Value        Reference Range Interpretation Comments

 

             Eosinophils # (test code = Eosinophils 122                   

           



             #)                                                  



[facility] MichiganWyuygokTSGPKNEDWS1359-34-84 14:47:00





             Test Item    Value        Reference Range Interpretation Comments

 

             Basophils # (test code 50           See_Comment                [Aut

omated message] The



             = Basophils #)                                        system which 

generated



                                                                 this result tra

nsmitted



                                                                 reference range

: <=200.



                                                                 The reference r

cheyenne was



                                                                 not used to int

erpret



                                                                 this result as



                                                                 normal/abnormal

.



[facility] CenterBvdprwfNULDWMELJO6708-20-29 14:47:00





             Test Item    Value        Reference Range Interpretation Comments

 

             Segs (test code = Segs) 75.2                                   



[facility] MichiganAasaoolWVGGKOACFS5404-98-96 14:47:00





             Test Item    Value        Reference Range Interpretation Comments

 

             Lymphocytes (test code = Lymphocytes) 16.0                         

          



[facility] MichiganPuleyhlWZOQMGXXPD8960-58-83 14:47:00





             Test Item    Value        Reference Range Interpretation Comments

 

             Monocytes (test code = Monocytes) 6.4                              

      



[facility] MichiganKtrftugQMNNLRTFYX2425-85-76 14:47:00





             Test Item    Value        Reference Range Interpretation Comments

 

             Eosinophils (test code = Eosinophils) 1.7                          

          



[facility]mgoidODGTHXYEKH8757-86-93 14:47:00





             Test Item    Value        Reference Range Interpretation Comments

 

             Basophils (test code = Basophils) 0.7                              

      



[facility] [id number] 14:47:00





             Test Item    Value        Reference Range Interpretation Comments

 

             Result 2 (Urine Culture) See Result Comment                        

   



             (test code = Result 2                                        



             (Urine Culture))                                        



[facility] [id number] 14:47:00





             Test Item    Value        Reference Range Interpretation Comments

 

             UA Color (test code = UA Color) YELLOW                             

    



[facility] [id number] 14:47:00





             Test Item    Value        Reference Range Interpretation Comments

 

             UA Turbidity (test code = UA CLOUDY                                

 



             Turbidity)                                          



[facility] [id number] 14:47:00





             Test Item    Value        Reference Range Interpretation Comments

 

             UA Spec Grav (test code = UA Spec 1.017 1      1.001-1.035         

      



             Grav)                                               



[facility] [id number] 14:47:00





             Test Item    Value        Reference Range Interpretation Comments

 

             UA pH (test code = UA pH) 5.5 1        5.0-8.0                   



[facility] [id number] 14:47:00





             Test Item    Value        Reference Range Interpretation Comments

 

             UA Glucose (test code = UA Glucose) NEGATIVE                       

        



[facility] [id number] 14:47:00





             Test Item    Value        Reference Range Interpretation Comments

 

             UA Bili (test code = UA Bili) NEGATIVE                             

  



[facility] [id number] 14:47:00





             Test Item    Value        Reference Range Interpretation Comments

 

             UA Ketones (test code = UA Ketones) NEGATIVE                       

        



[facility] [id number] 14:47:00





             Test Item    Value        Reference Range Interpretation Comments

 

             UA Blood (test code = UA Blood) 1+                                 

    



[facility] [id number] 14:47:00





             Test Item    Value        Reference Range Interpretation Comments

 

             UA Protein (test code = UA Protein) 1+                             

        



[facility] [id number] 14:47:00





             Test Item    Value        Reference Range Interpretation Comments

 

             UA Nitrite (test code = UA Nitrite) POSITIVE                       

        



[facility] [id number] 14:47:00





             Test Item    Value        Reference Range Interpretation Comments

 

             UA Leuk Est (test code = UA Leuk Est) 2+                           

          



[facility] [id number] 14:47:00





             Test Item    Value        Reference Range Interpretation Comments

 

             UA WBC (test code = UA WBC) > OR = 60                              



[facility] [id number] 14:47:00





             Test Item    Value        Reference Range Interpretation Comments

 

             UA RBC (test code = UA RBC) 0-2                                    



[facility] [id number] 14:47:00





             Test Item    Value        Reference Range Interpretation Comments

 

             UA Sq Epi (test code = UA Sq Epi) NONE SEEN                        

      



[facility] [id number] 14:47:00





             Test Item    Value        Reference Range Interpretation Comments

 

             UA Bacteria (test code = UA Bacteria) MANY                         

          



[facility] [id number] 14:47:00





             Test Item    Value        Reference Range Interpretation Comments

 

             UA Hyal Cast (test code = UA Hyal NONE SEEN                        

      



             Cast)                                               



[facility] AND [id number] 14:47:00





             Test Item    Value        Reference Range Interpretation Comments

 

             UA Reflex (test code CULTURE INDICATED -                           



             = UA Reflex) RESULTS TO FOLLOW                           



[facility] [id number] 14:47:00





             Test Item    Value        Reference Range Interpretation Comments

 

             U Creat mg/dL (test code = U Creat 114                       

       



             mg/dL)                                              



[facility] [id number] 14:47:00





             Test Item    Value        Reference Range Interpretation Comments

 

             U Alb (test code = U Alb) 16.7                                   



[facility] [id number] 14:47:00





             Test Item    Value        Reference Range Interpretation Comments

 

             U Alb/Crea (test code = U Alb/Crea) 146                            

        



[facility] PROTEIN ELECTROPHORESIS-24HR [id number] 08:01:00





             Test Item    Value        Reference Range Interpretation Comments

 

             CREATININE, 24 HOUR 1.45 g/24 h  0.50-2.15                 



             URINE (test code =                                        



             65218710)                                           

 

             PROTEIN/CREATININE 292 mg/g creat < OR = 114   H            



             RATION (test code =                                        



             02236248)                                           

 

             PROTEIN, TOTAL 24 HR  mg/24 h  <150         H            TEST

 PERFORMED



             (test code = 04929656)                                        AT:QU

EST



                                                                 [facility]

IN



                                                                 [address], [place]



                                                                 [id number] FELIX MD

 

             ALBUMIN (test code = 35 %                                   



             26487638)                                           

 

             ALPHA-1-GLOBULINS (test 10 %                                   



             code = 24295717)                                        

 

             ALPHA-2-GLOBULINS (test 12 %                                   



             code = 73306967)                                        

 

             BETA GLOBULINS (test 25 %                                   



             code = 13634035)                                        

 

             GAMMA GLOBULINS (test 18 %                                   



             code = 33545794)                                        

 

             INTERPRETATION (test                                        Albumin

 and



             code = 59529006)                                        various aba

bulin



                                                                 fractions



                                                                 detected on



                                                                 proteinelectrop

ho



                                                                 resis. No



                                                                 abnormal protei

n



                                                                 bands



                                                                 (Bence-Jonespro

te



                                                                 inuria)



                                                                 detected.TEST



                                                                 PERFORMED



                                                                 AT:[facility]

IN



                                                                 [address], [place]



                                                                 [id number] FELIX MD



NEUTROPHIL CYTOPLASMIC [id number] 05:19:00





             Test Item    Value        Reference Range Interpretation Comments

 

             ANCA SCREEN (test NEGATIVE     NEGATIVE                  ANCA Scree

n includes



             code = 61450333)                                        evaluation 

for p-ANCA,



                                                                 c-ANCA andatypi

tayla p-ANCA.



                                                                 A positive ANCA

 screen



                                                                 reflexes to tit

erand



                                                                 pattern(s), e.g

.,



                                                                 cytoplasmic pat

tern



                                                                 (c-ANCA),perinu

clear



                                                                 pattern (p-ANCA

), or



                                                                 atypical p-ANCA



                                                                 pattern.c-ANCA 

and p-ANCA



                                                                 are observed in

 vasculitis,



                                                                 whereasatypical

 p-ANCA is



                                                                 observed in IBD



                                                                 (Inflammatory



                                                                 BowelDisease). 

Atypical



                                                                 p-ANCA is detec

kolby in about



                                                                 55% to 80% ofpa

tients with



                                                                 ulcerative coli

tis but only



                                                                 5% to 25% ofpat

ients with



                                                                 Crohn's disease

.TEST



                                                                 PERFORMED AT:

[facility]



                                                                 [facility]



                                                                 [id number], [place]



                                                                 [id number] MARIEE MD,PHD

,RAMILA



COMPREHENSIVE METABOLIC [id number] 06:25:00





             Test Item    Value        Reference Range Interpretation Comments

 

             GLUCOSE (test code = 06D) 115 mg/dL           H            

 

             SODIUM (test code = 01A) 137 mmol/L   136-145                   

 

             POTASSIUM (test code = 01B) 3.3 mmol/L   3.6-5.1      L            

 

             CHLORIDE (test code = 04A) 97 mmol/L           L            

 

             CO2 (test code = 02A) 32 mmol/L    22-32                     

 

             ANION GAP (test code = ANG) 11.3 mmol/L                            

 

             BUN (test code = 05D) 36 mg/dL     7-18         H            

 

             CREATININE (test code = 03E) 1.4 mg/dL    0.4-1.1      H           

 

 

             BUN/CREA (test code = BCR) 25           12-20        H            

 

             CALCIUM (test code = 09D) 8.8 mg/dL    8.3-9.5                   

 

             BILI TOTAL (test code = 11A) 1.2 mg/dL    0.2-1.0      H           

 

 

             PROTEIN (test code = 07D) 6.5 g/dL     6.4-8.2                   

 

             ALBUMIN (test code = 08D) 2.9 g/dL     3.5-4.8      L            

 

             GLOBULIN (test code = GLB) 3.6 g/dL     1.5-3.8                   

 

             ALB/GLOB (test code = AGRR) 0.8          1.0-2.6      L            

 

             ALK PHOS (test code = 35A) 97 IU/L                          

 

             AST (test code = 30A) 15 IU/L      <=42                      

 

             ALT (test code = 31A) 35 IU/L      <=78                      



CBC (INCLUDES AUTOMATED DIFFERENTIAL)2019 06:06:00





             Test Item    Value        Reference Range Interpretation Comments

 

             WBC (test code = WBC) 6.9 10\S\3/uL 4.5-11.0                  

 

             RBC (test code = RBC) 3.56 10\S\6/uL 4.20-5.60    L            

 

             HGB (test code = HBG) 10.4 g/dL    12.0-15.5    L            

 

             HCT (test code = HCT) 32.1 %       35.0-44.0    L            

 

             MCV (test code = MCV) 90.2 fL      81.0-99.0                 

 

             MCH (test code = MCH) 29.2 pg      27.0-31.0                 

 

             MCHC (test code = MCHC) 32.4 g/dL    32.0-36.0                 

 

             RDW (test code = RDW) 15.0 %       11.5-14.5    H            

 

             PLT (test code = PLT) 158 10\S\3/uL 130-400                   

 

             MPV (test code = MPV) 10.7 fL      9.4-12.4                  

 

             NEUTROP # (test code = NE#) 4.4 10\S\3/uL 1.6-8.0                  

 

 

             LYMPH # (test code = LY#) 1.8 10\S\3/uL 1.1-3.5                   

 

             MONOCYTE # (test code = MO#) 0.5 10\S\3/uL 0.0-1.1                 

  

 

             EOSINOPH # (test code = EO#) 0.2 10\S\3/uL 0.0-0.7                 

  

 

             BASOPHIL # (test code = BA#) 0.0 10\S\3/uL 0.0-0.3                 

  

 

             IG # (test code = IG#) 0.03 10\S\3/uL 0.00-0.06                 

 

             NRBC # (test code = NRBC#) 0.00 10\S\3/uL 0.00-0.01                

 

 

             NEUTROPH % (test code = NE%) 64.0 %       35.0-73.0                

 

 

             LYMPH % (test code = LY%) 26.1 %       20.0-55.0                 

 

             MONO % (test code = MO%) 6.5 %        2.5-10.0                  

 

             EOSINOPH % (test code = EO%) 2.6 %        0.0-5.0                  

 

 

             BASOPHIL % (test code = BA%) 0.4 %        0.0-2.0                  

 

 

             IG % (test code = IG%) 0.4 %        0.0-0.8                   

 

             NRBC% (test code = NRBC%) 0.0 %        0.0-0.2                   

 

             MANDIFF (test code = MDIFF) NO           NO                        

 

             RBC MORPH (test code = RBCMOR)              NORMAL                 

   



URINE [id number] 09:53:00





             Test Item    Value        Reference Range Interpretation Comments

 

             Isolate 1 (test code = Proteus mirabilis              A            



             ISO1)                                               

 

             ampicillin (test code = am)  ug/mL                    S            

 

             ampicillin/sulbactam (test  ug/mL                    S            



             code = ams)                                         

 

             piperacillin/tazobactam  ug/mL                    S            



             (test code = tzp)                                        

 

             ceftazidime (test code =  ug/mL                    S            



             jeannine)                                                

 

             ceftriaxone1 (test code =  ug/mL                    S            



             ctr)                                                

 

             cefepime (test code = fep)  ug/mL                    S            

 

             aztreonam (test code = azm)  ug/mL                    S            

 

             ertapenem (test code = etp)  ug/mL                    S            

 

             meropenem (test code = mem)  ug/mL                    S            

 

             gentamicin (test code = gm)  ug/mL                    S            

 

             tobramycin (test code =  ug/mL                    S            



             tob)                                                

 

             levofloxacin (test code =  ug/mL                    S            



             lev)                                                

 

             trimethoprim/sulfamethoxazo  ug/mL                    S            



             le (test code = sxt)                                        



PARTHYROID HORMONE (INTACT)2019 08:24:00





             Test Item    Value        Reference Range Interpretation Comments

 

             PTH INTACT (test code 166.6 pg/mL  18.4-80.1    H            



             = A85)                                              

 

             Ref Range Change ***** Please note the                           



             (test code = REF change in reference                           



             RANGE)       range ***                              



VITAMIN D TOTAL 25 (OH)2019 07:15:00





             Test Item    Value        Reference Range Interpretation Comments

 

             VITAMIN D 25(OH) (test code = VD) 36.0 ng/mL   30.0-100.0          

      



SERUM PROTEIN [id number] 06:20:00





             Test Item    Value        Reference Range Interpretation Comments

 

             PROTEIN TOTAL (test 5.7 g/dL     6.1-8.1      L            TEST PER

FORMED AT:[facility]



             code = 17867904)                                        DIAGNOSTICS

[id number]



                                                                 [facility], [place]



                                                                 [id number] FELIX MD

 

             ALBUMIN (test code = 3.2 g/dL     3.8-4.8      L            



             12511199)                                           

 

             ALPHA-1-GLOBULINS 0.4 g/dL     0.2-0.3      H            



             (test code =                                        



             64793662)                                           

 

             ALPHA-2-GLOBULINS 0.8 g/dL     0.5-0.9                   



             (test code =                                        



             77262022)                                           

 

             BETA 1 GLOBULIN (test 0.5 g/dL     0.4-0.6                   



             code = [id number])                                        

 

             BETA 2 GLOBULIN (test 0.2 g/dL     0.2-0.5                   



             code = 38782079)                                        

 

             GAMMA GLOBULINS (test 0.6 g/dL     0.8-1.7      L            



             code = 60926511)                                        

 

             INTERPRETATION (test                                        Pattern

 consistent with



             code = 12331387)                                        an acute ph

ase



                                                                 reactionConsist

ent with



                                                                 hypogammaglobul

inemia.



                                                                 Serum free ligh

tchains



                                                                 or urine immuno

fixation



                                                                 should be consi

dered



                                                                 ifplasma cell d

yscrasias



                                                                 are a possible



                                                                 clinicaldiagnos

is.TEST



                                                                 PERFORMED AT:QU

EST



                                                                 DIAGNOSTICS-[facility]

[id number]



                                                                 [facility], [place]



                                                                 [id number] FELIX MD



[id number] 06:13:00





             Test Item    Value        Reference Range Interpretation Comments

 

             MAGNESIUM (test code = 48A) 1.7 mg/dL    1.8-2.4      L            



COMPREHENSIVE METABOLIC [id number] 06:13:00





             Test Item    Value        Reference Range Interpretation Comments

 

             GLUCOSE (test code = 06D) 110 mg/dL           H            

 

             SODIUM (test code = 01A) 140 mmol/L   136-145                   

 

             POTASSIUM (test code = 01B) 3.1 mmol/L   3.6-5.1      L            

 

             CHLORIDE (test code = 04A) 96 mmol/L           L            

 

             CO2 (test code = 02A) 34 mmol/L    22-32        H            

 

             ANION GAP (test code = ANG) 13.1 mmol/L                            

 

             BUN (test code = 05D) 33 mg/dL     7-18         H            

 

             CREATININE (test code = 03E) 1.5 mg/dL    0.4-1.1      H           

 

 

             BUN/CREA (test code = BCR) 22           12-20        H            

 

             CALCIUM (test code = 09D) 9.1 mg/dL    8.3-9.5                   

 

             BILI TOTAL (test code = 11A) 1.4 mg/dL    0.2-1.0      H           

 

 

             PROTEIN (test code = 07D) 7.0 g/dL     6.4-8.2                   

 

             ALBUMIN (test code = 08D) 3.2 g/dL     3.5-4.8      L            

 

             GLOBULIN (test code = GLB) 3.8 g/dL     1.5-3.8                   

 

             ALB/GLOB (test code = AGRR) 0.8          1.0-2.6      L            

 

             ALK PHOS (test code = 35A) 111 IU/L                         

 

             AST (test code = 30A) 18 IU/L      <=42                      

 

             ALT (test code = 31A) 43 IU/L      <=78                      



PHOSPHORUS (P04)2019 06:13:00





             Test Item    Value        Reference Range Interpretation Comments

 

             PHOSPHORUS (test code = 43D) 4.0 mg/dL    2.7-4.6                  

 



CBC (INCLUDES AUTOMATED DIFFERENTIAL)2019 05:48:00





             Test Item    Value        Reference Range Interpretation Comments

 

             WBC (test code = WBC) 9.4 10\S\3/uL 4.5-11.0                  

 

             RBC (test code = RBC) 3.73 10\S\6/uL 4.20-5.60    L            

 

             HGB (test code = HBG) 10.8 g/dL    12.0-15.5    L            

 

             HCT (test code = HCT) 33.8 %       35.0-44.0    L            

 

             MCV (test code = MCV) 90.6 fL      81.0-99.0                 

 

             MCH (test code = MCH) 29.0 pg      27.0-31.0                 

 

             MCHC (test code = MCHC) 32.0 g/dL    32.0-36.0                 

 

             RDW (test code = RDW) 15.1 %       11.5-14.5    H            

 

             PLT (test code = PLT) 189 10\S\3/uL 130-400                   

 

             MPV (test code = MPV) 11.8 fL      9.4-12.4                  

 

             NEUTROP # (test code = NE#) 7.0 10\S\3/uL 1.6-8.0                  

 

 

             LYMPH # (test code = LY#) 1.6 10\S\3/uL 1.1-3.5                   

 

             MONOCYTE # (test code = MO#) 0.7 10\S\3/uL 0.0-1.1                 

  

 

             EOSINOPH # (test code = EO#) 0.1 10\S\3/uL 0.0-0.7                 

  

 

             BASOPHIL # (test code = BA#) 0.0 10\S\3/uL 0.0-0.3                 

  

 

             IG # (test code = IG#) 0.06 10\S\3/uL 0.00-0.06                 

 

             NRBC # (test code = NRBC#) 0.00 10\S\3/uL 0.00-0.01                

 

 

             NEUTROPH % (test code = NE%) 74.5 %       35.0-73.0    H           

 

 

             LYMPH % (test code = LY%) 16.5 %       20.0-55.0    L            

 

             MONO % (test code = MO%) 7.0 %        2.5-10.0                  

 

             EOSINOPH % (test code = EO%) 1.1 %        0.0-5.0                  

 

 

             BASOPHIL % (test code = BA%) 0.3 %        0.0-2.0                  

 

 

             IG % (test code = IG%) 0.6 %        0.0-0.8                   

 

             NRBC% (test code = NRBC%) 0.0 %        0.0-0.2                   

 

             MANDIFF (test code = MDIFF) NO           NO                        

 

             RBC MORPH (test code = RBCMOR)              NORMAL                 

   



COMPREHENSIVE METABOLIC [id number] 05:30:00





             Test Item    Value        Reference Range Interpretation Comments

 

             GLUCOSE (test code = 06D) 122 mg/dL           H            

 

             SODIUM (test code = 01A) 140 mmol/L   136-145                   

 

             POTASSIUM (test code = 01B) 3.8 mmol/L   3.6-5.1                   

 

             CHLORIDE (test code = 04A) 100 mmol/L                       

 

             CO2 (test code = 02A) 32 mmol/L    22-32                     

 

             ANION GAP (test code = ANG) 11.8 mmol/L                            

 

             BUN (test code = 05D) 29 mg/dL     7-18         H            

 

             CREATININE (test code = 03E) 1.5 mg/dL    0.4-1.1      H           

 

 

             BUN/CREA (test code = BCR) 20           12-20                     

 

             CALCIUM (test code = 09D) 9.0 mg/dL    8.3-9.5                   

 

             BILI TOTAL (test code = 11A) 1.3 mg/dL    0.2-1.0      H           

 

 

             PROTEIN (test code = 07D) 7.1 g/dL     6.4-8.2                   

 

             ALBUMIN (test code = 08D) 3.5 g/dL     3.5-4.8                   

 

             GLOBULIN (test code = GLB) 3.6 g/dL     1.5-3.8                   

 

             ALB/GLOB (test code = AGRR) 1.0          1.0-2.6                   

 

             ALK PHOS (test code = 35A) 119 IU/L                         

 

             AST (test code = 30A) 22 IU/L      <=42                      

 

             ALT (test code = 31A) 49 IU/L      <=78                      



CBC (INCLUDES AUTOMATED DIFFERENTIAL)2019 05:18:00





             Test Item    Value        Reference Range Interpretation Comments

 

             WBC (test code = WBC) 9.0 10\S\3/uL 4.5-11.0                  

 

             RBC (test code = RBC) 3.94 10\S\6/uL 4.20-5.60    L            

 

             HGB (test code = HBG) 11.4 g/dL    12.0-15.5    L            

 

             HCT (test code = HCT) 35.8 %       35.0-44.0                 

 

             MCV (test code = MCV) 90.9 fL      81.0-99.0                 

 

             MCH (test code = MCH) 28.9 pg      27.0-31.0                 

 

             MCHC (test code = MCHC) 31.8 g/dL    32.0-36.0    L            

 

             RDW (test code = RDW) 14.8 %       11.5-14.5    H            

 

             PLT (test code = PLT) 164 10\S\3/uL 130-400                   

 

             MPV (test code = MPV) 11.6 fL      9.4-12.4                  

 

             NEUTROP # (test code = NE#) 6.8 10\S\3/uL 1.6-8.0                  

 

 

             LYMPH # (test code = LY#) 1.4 10\S\3/uL 1.1-3.5                   

 

             MONOCYTE # (test code = MO#) 0.6 10\S\3/uL 0.0-1.1                 

  

 

             EOSINOPH # (test code = EO#) 0.1 10\S\3/uL 0.0-0.7                 

  

 

             BASOPHIL # (test code = BA#) 0.0 10\S\3/uL 0.0-0.3                 

  

 

             IG # (test code = IG#) 0.06 10\S\3/uL 0.00-0.06                 

 

             NRBC # (test code = NRBC#) 0.00 10\S\3/uL 0.00-0.01                

 

 

             NEUTROPH % (test code = NE%) 75.6 %       35.0-73.0    H           

 

 

             LYMPH % (test code = LY%) 15.9 %       20.0-55.0    L            

 

             MONO % (test code = MO%) 6.5 %        2.5-10.0                  

 

             EOSINOPH % (test code = EO%) 0.9 %        0.0-5.0                  

 

 

             BASOPHIL % (test code = BA%) 0.4 %        0.0-2.0                  

 

 

             IG % (test code = IG%) 0.7 %        0.0-0.8                   

 

             NRBC% (test code = NRBC%) 0.0 %        0.0-0.2                   

 

             MANDIFF (test code = MDIFF) NO           NO                        

 

             RBC MORPH (test code = RBCMOR)              NORMAL                 

   



URINALYSIS WITH [id number] 06:44:00





             Test Item    Value        Reference Range Interpretation Comments

 

             COLOR (test code = COLU) YELLOW       YELLOW                    

 

             CLARITY (test code = CLA) CLOUDY       CLEAR        A            

 

             GLUCOSE UR (test code = UA NEGATIVE     NEGATIVE                  



             GLUCOSE)                                            

 

             BILI UR (test code = BILE) NEGATIVE     NEGATIVE                  

 

             KETONES UR (test code = ACE) NEGATIVE     NEGATIVE                 

 

 

             SP GRAVITY (test code = SPGR) 1.014        1.005-1.030             

  

 

             PH UR (test code = PH) 6.0          4.5-8.0                   

 

             PROTEIN UR (test code = PU) TRACE        NEGATIVE     A            

 

             UROBIL UR (test code = UROQ) 0.2 EU/dL    0.2-1.0                  

 

 

             NITRITE UR (test code = NEGATIVE     NEGATIVE                  



             NITRITE)                                            

 

             BLOOD UR (test code = UA BLOOD) TRACE        NEGATIVE     A        

    

 

             LEUK ES UR (test code = LEUK) 2+           NEGATIVE     A          

  

 

             WBC UR (test code = UWBC) 12 /HPF      0-5          H            

 

             RBC UR (test code = URBC) 1 /HPF       0-2                       

 

             EPITH  UR (test code = UEPC) FEW /LPF     FEW                      

 

 

             BACTERIA UR (test code = UBACT) MODERATE /HPF NONE         A       

     

 

             CAST UR (test code = CAST)  /LPF        NONE                      

 

             CRYSTAL UR (test code = CRYU)  / LPF       NONE                    

  

 

             MUCUS UR (test code = MUC)  / HPF       NONE                      

 

             AMORPH UR (test code = YASMEEN)  / HPF       NONE                     

 

 

             TRICH UR (test code = UTRICH)  /HPF        NONE                    

  

 

             YEAST UR (test code = UY)  /HPF        NONE                      

 

             SPERM UR (test code = USPERM)  /HPF        NONE                    

  



COMPREHENSIVE METABOLIC [id number] 05:24:00





             Test Item    Value        Reference Range Interpretation Comments

 

             GLUCOSE (test code = 06D) 105 mg/dL           H            

 

             SODIUM (test code = 01A) 138 mmol/L   136-145                   

 

             POTASSIUM (test code = 01B) 4.0 mmol/L   3.6-5.1                   

 

             CHLORIDE (test code = 04A) 104 mmol/L                       

 

             CO2 (test code = 02A) 25 mmol/L    22-32                     

 

             ANION GAP (test code = ANG) 13.0 mmol/L                            

 

             BUN (test code = 05D) 29 mg/dL     7-18         H            

 

             CREATININE (test code = 03E) 1.5 mg/dL    0.4-1.1      H           

 

 

             BUN/CREA (test code = BCR) 19           12-20                     

 

             CALCIUM (test code = 09D) 8.4 mg/dL    8.3-9.5                   

 

             BILI TOTAL (test code = 11A) 0.7 mg/dL    0.2-1.0                  

 

 

             PROTEIN (test code = 07D) 6.2 g/dL     6.4-8.2      L            

 

             ALBUMIN (test code = 08D) 3.1 g/dL     3.5-4.8      L            

 

             GLOBULIN (test code = GLB) 3.1 g/dL     1.5-3.8                   

 

             ALB/GLOB (test code = AGRR) 1.0          1.0-2.6                   

 

             ALK PHOS (test code = 35A) 104 IU/L                         

 

             AST (test code = 30A) 22 IU/L      <=42                      

 

             ALT (test code = 31A) 42 IU/L      <=78                      



CBC (INCLUDES AUTOMATED DIFFERENTIAL)2019 05:07:00





             Test Item    Value        Reference Range Interpretation Comments

 

             WBC (test code = WBC) 8.6 10\S\3/uL 4.5-11.0                  

 

             RBC (test code = RBC) 3.72 10\S\6/uL 4.20-5.60    L            

 

             HGB (test code = HBG) 10.8 g/dL    12.0-15.5    L            

 

             HCT (test code = HCT) 33.7 %       35.0-44.0    L            

 

             MCV (test code = MCV) 90.6 fL      81.0-99.0                 

 

             MCH (test code = MCH) 29.0 pg      27.0-31.0                 

 

             MCHC (test code = MCHC) 32.0 g/dL    32.0-36.0                 

 

             RDW (test code = RDW) 14.7 %       11.5-14.5    H            

 

             PLT (test code = PLT) 152 10\S\3/uL 130-400                   

 

             MPV (test code = MPV) 11.4 fL      9.4-12.4                  

 

             NEUTROP # (test code = NE#) 6.2 10\S\3/uL 1.6-8.0                  

 

 

             LYMPH # (test code = LY#) 1.6 10\S\3/uL 1.1-3.5                   

 

             MONOCYTE # (test code = MO#) 0.5 10\S\3/uL 0.0-1.1                 

  

 

             EOSINOPH # (test code = EO#) 0.2 10\S\3/uL 0.0-0.7                 

  

 

             BASOPHIL # (test code = BA#) 0.1 10\S\3/uL 0.0-0.3                 

  

 

             IG # (test code = IG#) 0.03 10\S\3/uL 0.00-0.06                 

 

             NRBC # (test code = NRBC#) 0.00 10\S\3/uL 0.00-0.01                

 

 

             NEUTROPH % (test code = NE%) 72.8 %       35.0-73.0                

 

 

             LYMPH % (test code = LY%) 18.5 %       20.0-55.0    L            

 

             MONO % (test code = MO%) 5.8 %        2.5-10.0                  

 

             EOSINOPH % (test code = EO%) 2.0 %        0.0-5.0                  

 

 

             BASOPHIL % (test code = BA%) 0.6 %        0.0-2.0                  

 

 

             IG % (test code = IG%) 0.3 %        0.0-0.8                   

 

             NRBC% (test code = NRBC%) 0.0 %        0.0-0.2                   

 

             MANDIFF (test code = MDIFF) NO           NO                        

 

             RBC MORPH (test code = RBCMOR)              NORMAL                 

   



U/S KIDNEY (RENAL)2019 23:20:42LOCATION: [id number]: 63-year-old female 
who presents with acute nontraumatic kidney injury.COMMENT:Sonographic imaging 
of this patient's retroperitoneum was performed.The right kidney measures 9.9 x 
5.9 x 6.4 cm with a 13 mm cortical thickness. Acyst is seen in the upper pole 
measuring 23 x 19 x 19 mm, and a second cyst isseen in the lower pole measuring 
18 x 16 x 16 mm.The left kidney measures 9.4 x 5.3 x 5.6 cm with a 11 mm 
cortical thickness. Nocysts or masses are seen in the left kidney.Cortical 
echotexture of the kidneys otherwise is unremarkable.There is no evidence of 
hydronephrosis of either kidney.In the urinary bladder only the left urine jet 
was observed on the color flowstudy. The bladder otherwise is 
unremarkable.IMPRESSION:Cystic changes are seen in the upper pole and lower pole
ofthis patient'sright kidney. No gross abnormalities are seen elsewhere in 
either kidney.The urinary bladder is unremarkable. Only the left urine jet was 
observed onthe color flow study.TROPONIN -[id number] 07:14:00





             Test Item    Value        Reference Range Interpretation Comments

 

             TROPONIN I (test code = A84) <0.015 ng/mL 0.000-0.045              

 



COMPREHENSIVE METABOLIC [id number] 05:28:00





             Test Item    Value        Reference Range Interpretation Comments

 

             GLUCOSE (test code = 06D) 110 mg/dL           H            

 

             SODIUM (test code = 01A) 138 mmol/L   136-145                   

 

             POTASSIUM (test code = 01B) 3.9 mmol/L   3.6-5.1                   

 

             CHLORIDE (test code = 04A) 106 mmol/L                       

 

             CO2 (test code = 02A) 24 mmol/L    22-32                     

 

             ANION GAP (test code = ANG) 11.9 mmol/L                            

 

             BUN (test code = 05D) 22 mg/dL     7-18         H            

 

             CREATININE (test code = 03E) 1.5 mg/dL    0.4-1.1      H           

 

 

             BUN/CREA (test code = BCR) 15           12-20                     

 

             CALCIUM (test code = 09D) 8.2 mg/dL    8.3-9.5      L            

 

             BILI TOTAL (test code = 11A) 0.6 mg/dL    0.2-1.0                  

 

 

             PROTEIN (test code = 07D) 6.0 g/dL     6.4-8.2      L            

 

             ALBUMIN (test code = 08D) 2.9 g/dL     3.5-4.8      L            

 

             GLOBULIN (test code = GLB) 3.1 g/dL     1.5-3.8                   

 

             ALB/GLOB (test code = AGRR) 0.9          1.0-2.6      L            

 

             ALK PHOS (test code = 35A) 96 IU/L                          

 

             AST (test code = 30A) 19 IU/L      <=42                      

 

             ALT (test code = 31A) 35 IU/L      <=78                      



CBC (INCLUDES AUTOMATED DIFFERENTIAL)2019 05:14:00





             Test Item    Value        Reference Range Interpretation Comments

 

             WBC (test code = WBC) 7.0 10\S\3/uL 4.5-11.0                  

 

             RBC (test code = RBC) 3.64 10\S\6/uL 4.20-5.60    L            

 

             HGB (test code = HBG) 10.6 g/dL    12.0-15.5    L            

 

             HCT (test code = HCT) 33.5 %       35.0-44.0    L            

 

             MCV (test code = MCV) 92.0 fL      81.0-99.0                 

 

             MCH (test code = MCH) 29.1 pg      27.0-31.0                 

 

             MCHC (test code = MCHC) 31.6 g/dL    32.0-36.0    L            

 

             RDW (test code = RDW) 14.9 %       11.5-14.5    H            

 

             PLT (test code = PLT) 145 10\S\3/uL 130-400                   

 

             MPV (test code = MPV) 11.4 fL      9.4-12.4                  

 

             NEUTROP # (test code = NE#) 4.2 10\S\3/uL 1.6-8.0                  

 

 

             LYMPH # (test code = LY#) 2.1 10\S\3/uL 1.1-3.5                   

 

             MONOCYTE # (test code = MO#) 0.5 10\S\3/uL 0.0-1.1                 

  

 

             EOSINOPH # (test code = EO#) 0.1 10\S\3/uL 0.0-0.7                 

  

 

             BASOPHIL # (test code = BA#) 0.0 10\S\3/uL 0.0-0.3                 

  

 

             IG # (test code = IG#) 0.04 10\S\3/uL 0.00-0.06                 

 

             NRBC # (test code = NRBC#) 0.00 10\S\3/uL 0.00-0.01                

 

 

             NEUTROPH % (test code = NE%) 59.7 %       35.0-73.0                

 

 

             LYMPH % (test code = LY%) 30.1 %       20.0-55.0                 

 

             MONO % (test code = MO%) 7.0 %        2.5-10.0                  

 

             EOSINOPH % (test code = EO%) 2.0 %        0.0-5.0                  

 

 

             BASOPHIL % (test code = BA%) 0.6 %        0.0-2.0                  

 

 

             IG % (test code = IG%) 0.6 %        0.0-0.8                   

 

             NRBC% (test code = NRBC%) 0.0 %        0.0-0.2                   

 

             MANDIFF (test code = MDIFF) NO           NO                        

 

             RBC MORPH (test code = RBCMOR)              NORMAL                 

   



COMPREHENSIVE METABOLIC PAN2019-08-15 04:58:00





             Test Item    Value        Reference Range Interpretation Comments

 

             GLUCOSE (test code = 06D) 112 mg/dL           H            

 

             SODIUM (test code = 01A) 143 mmol/L   136-145                   

 

             POTASSIUM (test code = 01B) 4.4 mmol/L   3.6-5.1                   

 

             CHLORIDE (test code = 04A) 108 mmol/L          H            

 

             CO2 (test code = 02A) 27 mmol/L    22-32                     

 

             ANION GAP (test code = ANG) 12.4 mmol/L                            

 

             BUN (test code = 05D) 19 mg/dL     7-18         H            

 

             CREATININE (test code = 03E) 1.4 mg/dL    0.4-1.1      H           

 

 

             BUN/CREA (test code = BCR) 14           12-20                     

 

             CALCIUM (test code = 09D) 8.5 mg/dL    8.3-9.5                   

 

             BILI TOTAL (test code = 11A) 0.9 mg/dL    0.2-1.0                  

 

 

             PROTEIN (test code = 07D) 6.2 g/dL     6.4-8.2      L            

 

             ALBUMIN (test code = 08D) 2.9 g/dL     3.5-4.8      L            

 

             GLOBULIN (test code = GLB) 3.3 g/dL     1.5-3.8                   

 

             ALB/GLOB (test code = AGRR) 0.9          1.0-2.6      L            

 

             ALK PHOS (test code = 35A) 95 IU/L                          

 

             AST (test code = 30A) 19 IU/L      <=42                      

 

             ALT (test code = 31A) 40 IU/L      <=78                      



CBC (INCLUDES AUTOMATED DIFFERENTIAL)2019-08-15 04:51:00





             Test Item    Value        Reference Range Interpretation Comments

 

             WBC (test code = WBC) 8.2 10\S\3/uL 4.5-11.0                  

 

             RBC (test code = RBC) 3.48 10\S\6/uL 4.20-5.60    L            

 

             HGB (test code = HBG) 10.3 g/dL    12.0-15.5    L            

 

             HCT (test code = HCT) 32.4 %       35.0-44.0    L            

 

             MCV (test code = MCV) 93.1 fL      81.0-99.0                 

 

             MCH (test code = MCH) 29.6 pg      27.0-31.0                 

 

             MCHC (test code = MCHC) 31.8 g/dL    32.0-36.0    L            

 

             RDW (test code = RDW) 15.5 %       11.5-14.5    H            

 

             PLT (test code = PLT) 163 10\S\3/uL 130-400                   

 

             MPV (test code = MPV) 11.8 fL      9.4-12.4                  

 

             NEUTROP # (test code = NE#) 5.3 10\S\3/uL 1.6-8.0                  

 

 

             LYMPH # (test code = LY#) 2.0 10\S\3/uL 1.1-3.5                   

 

             MONOCYTE # (test code = MO#) 0.6 10\S\3/uL 0.0-1.1                 

  

 

             EOSINOPH # (test code = EO#) 0.2 10\S\3/uL 0.0-0.7                 

  

 

             BASOPHIL # (test code = BA#) 0.0 10\S\3/uL 0.0-0.3                 

  

 

             IG # (test code = IG#) 0.03 10\S\3/uL 0.00-0.06                 

 

             NRBC # (test code = NRBC#) 0.00 10\S\3/uL 0.00-0.01                

 

 

             NEUTROPH % (test code = NE%) 65.5 %       35.0-73.0                

 

 

             LYMPH % (test code = LY%) 24.2 %       20.0-55.0                 

 

             MONO % (test code = MO%) 7.2 %        2.5-10.0                  

 

             EOSINOPH % (test code = EO%) 2.2 %        0.0-5.0                  

 

 

             BASOPHIL % (test code = BA%) 0.5 %        0.0-2.0                  

 

 

             IG % (test code = IG%) 0.4 %        0.0-0.8                   

 

             NRBC% (test code = NRBC%) 0.0 %        0.0-0.2                   

 

             MANDIFF (test code = MDIFF) NO           NO                        



CARDIAC [id number] 23:10:00





             Test Item    Value        Reference Range Interpretation Comments

 

             TROPONIN I (test code = A84) <0.015 ng/mL 0.000-0.045              

 



U/S VENOUS DOPPLER IVON LOW [id number] 22:23:14LOCATION: [id number]: 
63-year-old female who presents with bilateral leg swelling.COMMENT:    Sonogra
[facility] imaging of the venous anatomy in both of this patient's legs wasobtained 
utilizing grayscale, color-flow, and Doppler waveform imagingmodalities.The 
common femoral veins, superficial femoral veins, popliteal veins, 
posteriortibial veins, anterior tibial veins, and peroneal veins in both legs 
wereincluded in the study.The anatomy exhibits normal compressibility on 
grayscale study. No suspiciousfilling defects are seen on the color flow study. 
Appropriate waveform responseas are noted on the Doppler study during 
augmentation maneuvers and duringquiet respiration.    IMPRESSION:There is no 
sonographic evidence of venous thrombosis in either of thispatient's legs.
CARDIAC [id number] 16:50:00





             Test Item    Value        Reference Range Interpretation Comments

 

             TROPONIN I (test code = A84) <0.015 ng/mL 0.000-0.045              

 



BRAIN NATRIURETIC [id number] 14:39:00





             Test Item    Value        Reference Range Interpretation Comments

 

             proBNP (test code = PBNP) 1337 pg/mL   0-125        H            



THYROID PANEL/SCREEN (TSH)2019 12:05:00





             Test Item    Value        Reference Range Interpretation Comments

 

             TSH (test code = A57) 0.989 uIU/mL 0.358-3.740               



[id number] 12:02:00





             Test Item    Value        Reference Range Interpretation Comments

 

             CPK (test code = 32A) 98 IU/L                          



AMYLASE AND [id number] 12:02:00





             Test Item    Value        Reference Range Interpretation Comments

 

             AMYLASE (test code = 10A) 19 U/L              L            

 

             LIPASE (test code = 60A) 67 IU/L             L            



COMPREHENSIVE METABOLIC [id number] 12:02:00





             Test Item    Value        Reference Range Interpretation Comments

 

             GLUCOSE (test code = 06D) 109 mg/dL           H            

 

             SODIUM (test code = 01A) 142 mmol/L   136-145                   

 

             POTASSIUM (test code = 01B) 4.2 mmol/L   3.6-5.1                   

 

             CHLORIDE (test code = 04A) 109 mmol/L          H            

 

             CO2 (test code = 02A) 26 mmol/L    22-32                     

 

             ANION GAP (test code = ANG) 11.2 mmol/L                            

 

             BUN (test code = 05D) 16 mg/dL     7-18                      

 

             CREATININE (test code = 03E) 1.3 mg/dL    0.4-1.1      H           

 

 

             BUN/CREA (test code = BCR) 13           12-20                     

 

             CALCIUM (test code = 09D) 8.7 mg/dL    8.3-9.5                   

 

             BILI TOTAL (test code = 11A) 1.3 mg/dL    0.2-1.0      H           

 

 

             PROTEIN (test code = 07D) 6.5 g/dL     6.4-8.2                   

 

             ALBUMIN (test code = 08D) 3.2 g/dL     3.5-4.8      L            

 

             GLOBULIN (test code = GLB) 3.3 g/dL     1.5-3.8                   

 

             ALB/GLOB (test code = AGRR) 1.0          1.0-2.6                   

 

             ALK PHOS (test code = 35A) 101 IU/L                         

 

             AST (test code = 30A) 21 IU/L      <=42                      

 

             ALT (test code = 31A) 41 IU/L      <=78                      



TROPONIN -15-09 11:57:00





             Test Item    Value        Reference Range Interpretation Comments

 

             TROPONIN I (test code = A84) <0.015 ng/mL 0.000-0.045              

 



XR CHEST 1 VIEW [id number] 11:55:22EXAM: XR CHEST 1 VIEW 
PORTABLE.LOCATION: D4.HISTORY: [id number]: Chest pain.COMPARISON: Radiograph dated
2019.TECHNIQUE: Single AP view of the chest was obtained. FINDINGS:The 
heart is enlarged in size. Small left pleural effusion and left basilaropacities
are present. There is elevation of the right hemidiaphragm. No acuteosseous 
abnormality is identified.IMPRESSION:Small left pleural effusion with left 
basilar opacities, which may representatelectasis or infiltrates.Cardiomegaly.
PRO TIME AND [id number] 11:43:00





             Test Item    Value        Reference Range Interpretation Comments

 

             PT (test code = 13.4 s       9.8-13.6                  



             TT)                                                 

 

             INR (test code = 1.2                                    



             INR)                                                

 

             INRH (test code =      **** SUGGESTED                           



             INRH)        THERAPEUTIC RANGE FOR INR:                           



                          ****    2.5 - 3.5 **  For                           



                          Patients with Prosthetic                           



                          Valves or Patients with                           



                                          recurrent                           



                          Thromboembolic Events **                           



                          2.0 - 3.0 ** For Most Other                           



                          Applications **                           

 

             PTT (test code = 30.4 s       20.2-38.0                 



             PTT)                                                

 

             PTTH (test code = **** To monitor the                           



             PTTH)        effectiveness of heparin,                           



                          we offer the Anti-Xa                           



                          (Heparin Assay). It can be                           



                          used for either                           



                          unfractionated or LMW                           



                          Heparin. Order Code is                           



                          ANTI-XA ****                           



CBC (INCLUDES AUTOMATED DIFFERENTIAL)2019 11:36:00





             Test Item    Value        Reference Range Interpretation Comments

 

             WBC (test code = WBC) 7.6 10\S\3/uL 4.5-11.0                  

 

             RBC (test code = RBC) 3.53 10\S\6/uL 4.20-5.60    L            

 

             HGB (test code = HBG) 10.3 g/dL    12.0-15.5    L            

 

             HCT (test code = HCT) 33.5 %       35.0-44.0    L            

 

             MCV (test code = MCV) 94.9 fL      81.0-99.0                 

 

             MCH (test code = MCH) 29.2 pg      27.0-31.0                 

 

             MCHC (test code = MCHC) 30.7 g/dL    32.0-36.0    L            

 

             RDW (test code = RDW) 15.4 %       11.5-14.5    H            

 

             PLT (test code = PLT) 164 10\S\3/uL 130-400                   

 

             MPV (test code = MPV) 11.7 fL      9.4-12.4                  

 

             NEUTROP # (test code = NE#) 5.6 10\S\3/uL 1.6-8.0                  

 

 

             LYMPH # (test code = LY#) 1.4 10\S\3/uL 1.1-3.5                   

 

             MONOCYTE # (test code = MO#) 0.4 10\S\3/uL 0.0-1.1                 

  

 

             EOSINOPH # (test code = EO#) 0.1 10\S\3/uL 0.0-0.7                 

  

 

             BASOPHIL # (test code = BA#) 0.0 10\S\3/uL 0.0-0.3                 

  

 

             IG # (test code = IG#) 0.04 10\S\3/uL 0.00-0.06                 

 

             NRBC # (test code = NRBC#) 0.00 10\S\3/uL 0.00-0.01                

 

 

             NEUTROPH % (test code = NE%) 73.9 %       35.0-73.0    H           

 

 

             LYMPH % (test code = LY%) 18.0 %       20.0-55.0    L            

 

             MONO % (test code = MO%) 5.7 %        2.5-10.0                  

 

             EOSINOPH % (test code = EO%) 1.6 %        0.0-5.0                  

 

 

             BASOPHIL % (test code = BA%) 0.3 %        0.0-2.0                  

 

 

             IG % (test code = IG%) 0.5 %        0.0-0.8                   

 

             NRBC% (test code = NRBC%) 0.0 %        0.0-0.2                   

 

             MANDIFF (test code = MDIFF) NO           NO                        

 

             RBC MORPH (test code = RBCMOR)              NORMAL                 

   



CBC WITH  [id number] 15:29:00





             Test Item    Value        Reference Range Interpretation Comments

 

             WBC (test code = WBC) 9.7 10\S\3/uL 4.5-11.0                  

 

             RBC (test code = RBC) 3.73 10\S\6/uL 4.20-5.60    L            

 

             HGB (test code = HBG) 11.0 g/dL    12.0-15.5    L            

 

             HCT (test code = HCT) 34.1 %       35.0-44.0    L            

 

             MCV (test code = MCV) 91.4 fL      81.0-99.0                 

 

             MCH (test code = MCH) 29.5 pg      27.0-31.0                 

 

             MCHC (test code = MCHC) 32.3 g/dL    32.0-36.0                 

 

             RDW (test code = RDW) 14.2 %       11.5-14.5                 

 

             PLT (test code = PLT) 116 10\S\3/uL 130-400      L            

 

             MPV (test code = MPV) 11.8 fL      9.4-12.4                  

 

             NEUTROP # (test code = NE#) 7.9 10\S\3/uL 1.6-8.0                  

 

 

             LYMPH # (test code = LY#) 1.0 10\S\3/uL 1.1-3.5      L            

 

             MONOCYTE # (test code = 0.6 10\S\3/uL 0.0-1.1                   



             MO#)                                                

 

             EOSINOPH # (test code = 0.1 10\S\3/uL 0.0-0.7                   



             EO#)                                                

 

             BASOPHIL # (test code = 0.0 10\S\3/uL 0.0-0.3                   



             BA#)                                                

 

             IG # (test code = IG#) 0.04 10\S\3/uL 0.00-0.06                 

 

             NRBC # (test code = NRBC#) 0.00 10\S\3/uL 0.00-0.01                

 

 

             NEUTROPH % (test code = 81.6 %       35.0-73.0    H            



             NE%)                                                

 

             LYMPH % (test code = LY%) 10.3 %       20.0-55.0    L            

 

             MONO % (test code = MO%) 6.4 %        2.5-10.0                  

 

             EOSINOPH % (test code = 1.0 %        0.0-5.0                   



             EO%)                                                

 

             BASOPHIL % (test code = 0.3 %        0.0-2.0                   



             BA%)                                                

 

             IG % (test code = IG%) 0.4 %        0.0-0.8                   

 

             NRBC% (test code = NRBC%) 0.0 %        0.0-0.2                   

 

             PLT EST (test code = ADEQUATE     ADEQUATE                  



             PLTEST)                                             

 

             PLT MORPH (test code = NORMAL (1.5-3 um) NORMAL                    



             PLTMOR)                                             



BASIC METABOLIC [id number] 15:26:00





             Test Item    Value        Reference Range Interpretation Comments

 

             GLUCOSE (test code = 06D) 118 mg/dL           H            

 

             SODIUM (test code = 01A) 136 mmol/L   136-145                   

 

             POTASSIUM (test code = 01B) 4.2 mmol/L   3.6-5.1                   

 

             CHLORIDE (test code = 04A) 106 mmol/L                       

 

             CO2 (test code = 02A) 24 mmol/L    22-32                     

 

             ANION GAP (test code = ANG) 10.2 mmol/L                            

 

             BUN (test code = 05D) 38 mg/dL     7-18         H            

 

             CREATININE (test code = 03E) 1.6 mg/dL    0.4-1.1      H           

 

 

             BUN/CREA (test code = BCR) 23           12-20        H            

 

             CALCIUM (test code = 09D) 9.2 mg/dL    8.3-9.5                   



CARDIAC [id number] 06:04:00





             Test Item    Value        Reference Range Interpretation Comments

 

             TROPONIN I (test code = A84) <0.015 ng/mL 0.000-0.045              

 



BASIC METABOLIC [id number] 05:52:00





             Test Item    Value        Reference Range Interpretation Comments

 

             GLUCOSE (test code = 06D) 171 mg/dL           H            

 

             SODIUM (test code = 01A) 138 mmol/L   136-145                   

 

             POTASSIUM (test code = 01B) 4.0 mmol/L   3.6-5.1                   

 

             CHLORIDE (test code = 04A) 107 mmol/L                       

 

             CO2 (test code = 02A) 23 mmol/L    22-32                     

 

             ANION GAP (test code = ANG) 12.0 mmol/L                            

 

             BUN (test code = 05D) 19 mg/dL     7-18         H            

 

             CREATININE (test code = 03E) 1.2 mg/dL    0.4-1.1      H           

 

 

             BUN/CREA (test code = BCR) 15           12-20                     

 

             CALCIUM (test code = 09D) 8.5 mg/dL    8.3-9.5                   



CBC (INCLUDES AUTOMATED DIFFERENTIAL)2019 05:30:00





             Test Item    Value        Reference Range Interpretation Comments

 

             WBC (test code = WBC) 14.0 10\S\3/uL 4.5-11.0     H            

 

             RBC (test code = RBC) 3.64 10\S\6/uL 4.20-5.60    L            

 

             HGB (test code = HBG) 10.8 g/dL    12.0-15.5    L            

 

             HCT (test code = HCT) 33.2 %       35.0-44.0    L            

 

             MCV (test code = MCV) 91.2 fL      81.0-99.0                 

 

             MCH (test code = MCH) 29.7 pg      27.0-31.0                 

 

             MCHC (test code = MCHC) 32.5 g/dL    32.0-36.0                 

 

             RDW (test code = RDW) 14.0 %       11.5-14.5                 

 

             PLT (test code = PLT) 143 10\S\3/uL 130-400                   

 

             MPV (test code = MPV) 11.4 fL      9.4-12.4                  

 

             NEUTROP # (test code = NE#) 12.1 10\S\3/uL 1.6-8.0      H          

  

 

             LYMPH # (test code = LY#) 0.9 10\S\3/uL 1.1-3.5      L            

 

             MONOCYTE # (test code = MO#) 0.9 10\S\3/uL 0.0-1.1                 

  

 

             EOSINOPH # (test code = EO#) 0.0 10\S\3/uL 0.0-0.7                 

  

 

             BASOPHIL # (test code = BA#) 0.0 10\S\3/uL 0.0-0.3                 

  

 

             IG # (test code = IG#) 0.10 10\S\3/uL 0.00-0.06    H            

 

             NRBC # (test code = NRBC#) 0.00 10\S\3/uL 0.00-0.01                

 

 

             NEUTROPH % (test code = NE%) 86.2 %       35.0-73.0    H           

 

 

             LYMPH % (test code = LY%) 6.3 %        20.0-55.0    L            

 

             MONO % (test code = MO%) 6.6 %        2.5-10.0                  

 

             EOSINOPH % (test code = EO%) 0.0 %        0.0-5.0                  

 

 

             BASOPHIL % (test code = BA%) 0.2 %        0.0-2.0                  

 

 

             IG % (test code = IG%) 0.7 %        0.0-0.8                   

 

             NRBC% (test code = NRBC%) 0.0 %        0.0-0.2                   

 

             MANDIFF (test code = MDIFF) NO           NO                        

 

             RBC MORPH (test code = RBCMOR)              NORMAL                 

   



CARDIAC [id number] 23:08:00





             Test Item    Value        Reference Range Interpretation Comments

 

             TROPONIN I (test code = A84) <0.015 ng/mL 0.000-0.045              

 



CT DISSECTION [id number] 19:26:35Exam: CT thorax PE protocol.Location: H
12History: [id number]: Chest painTechnique: Enhanced spiral slices were taken from
the apices of the lungs,through the upper abdomen utilizing a pulmonary embolism
protocol. Multiplanarreformations were performed. 100cc of Omnipaque were used. 
One or more of thefollowing radiation dose reduction techniques was used: 
automated exposurecontrol, adjustment of mA and/or KV according to patient size,
and/orutilization of iterative reconstruction technique.Findings:Nofilling 
defects are seen in the main pulmonary arteries. The pulmonaryvasculature is 
normal. No pulmonary venous congestion or arterial hypertensionis seen.The lungs
are clear. No infiltration or effusion is seen. No mass or nodule isseen.The 
mediastinum and the pulmonary kandis are normal. Calcified granulomas arenoted. No
lymphadenopathy is present. The heart size is  normal.No pericardialeffusion is
seen.The  visualized upper abdominal organs are unremarkable.Impression:1. 
Negative for pulmonary embolism.2. No acute disease.THYROID PANEL/SCREEN (TSH)
2019 18:29:00





             Test Item    Value        Reference Range Interpretation Comments

 

             TSH (test code = A57) 0.706 uIU/mL 0.358-3.740               



LIVER [id number] 18:28:00





             Test Item    Value        Reference Range Interpretation Comments

 

             BILI TOTAL (test code = 11A) 0.9 mg/dL    0.2-1.0                  

 

 

             BILI DIRCT (test code = 12A) 0.2 mg/dL    0.0-0.2                  

 

 

             BILI INDIR (test code = BILII) 0.7 mg/dL    <=0.8                  

   

 

             PROTEIN (test code = 07D) 7.7 g/dL     6.4-8.2                   

 

             ALBUMIN (test code = 08D) 3.8 g/dL     3.5-4.8                   

 

             GLOBULIN (test code = GLB) 3.9 g/dL     1.5-3.8      H            

 

             ALB/GLOB (test code = AGRR) 1.0          1.0-2.6                   

 

             ALK PHOS (test code = 35A) 106 IU/L                         

 

             AST (test code = 30A) 25 IU/L      <=42                      

 

             ALT (test code = 31A) 21 IU/L      <=78                      



BRAIN NATRIURETIC [id number] 18:19:00





             Test Item    Value        Reference Range Interpretation Comments

 

             proBNP (test code = PBNP) 518 pg/mL    0-125        H            



[id number] 18:15:00





             Test Item    Value        Reference Range Interpretation Comments

 

             MAGNESIUM (test code = 48A) 1.9 mg/dL    1.8-2.4                   



[id number] 17:40:00





             Test Item    Value        Reference Range Interpretation Comments

 

             D-DIMER (test code = <200 ng/mL D-DU 0-234                     



             DDI)                                                

 

             D-DIMER COMMENT (test *Level to rule out                           



             code = DDCOM) DVT or PE: <235 ng/mL                           



                          D-DU*                                  



CARDIAC [id number] 17:31:00





             Test Item    Value        Reference Range Interpretation Comments

 

             TROPONIN I (test code = A84) <0.015 ng/mL 0.000-0.045              

 



BASIC METABOLIC [id number] 17:27:00





             Test Item    Value        Reference Range Interpretation Comments

 

             GLUCOSE (test code = 06D) 114 mg/dL           H            

 

             SODIUM (test code = 01A) 142 mmol/L   136-145                   

 

             POTASSIUM (test code = 01B) 4.0 mmol/L   3.6-5.1                   

 

             CHLORIDE (test code = 04A) 111 mmol/L          H            

 

             CO2 (test code = 02A) 26 mmol/L    22-32                     

 

             ANION GAP (test code = ANG) 9.0 mmol/L                             

 

             BUN (test code = 05D) 19 mg/dL     7-18         H            

 

             CREATININE (test code = 03E) 1.2 mg/dL    0.4-1.1      H           

 

 

             BUN/CREA (test code = BCR) 15           12-20                     

 

             CALCIUM (test code = 09D) 9.2 mg/dL    8.3-9.5                   



CBC (INCLUDES AUTOMATED DIFFERENTIAL)2019 17:26:00





             Test Item    Value        Reference Range Interpretation Comments

 

             WBC (test code = WBC) 12.6 10\S\3/uL 4.5-11.0     H            

 

             RBC (test code = RBC) 4.04 10\S\6/uL 4.20-5.60    L            

 

             HGB (test code = HBG) 11.9 g/dL    12.0-15.5    L            

 

             HCT (test code = HCT) 37.6 %       35.0-44.0                 

 

             MCV (test code = MCV) 93.1 fL      81.0-99.0                 

 

             MCH (test code = MCH) 29.5 pg      27.0-31.0                 

 

             MCHC (test code = MCHC) 31.6 g/dL    32.0-36.0    L            

 

             RDW (test code = RDW) 13.8 %       11.5-14.5                 

 

             PLT (test code = PLT) 152 10\S\3/uL 130-400                   

 

             MPV (test code = MPV) 11.3 fL      9.4-12.4                  

 

             NEUTROP # (test code = NE#) 10.7 10\S\3/uL 1.6-8.0      H          

  

 

             LYMPH # (test code = LY#) 1.1 10\S\3/uL 1.1-3.5                   

 

             MONOCYTE # (test code = MO#) 0.6 10\S\3/uL 0.0-1.1                 

  

 

             EOSINOPH # (test code = EO#) 0.1 10\S\3/uL 0.0-0.7                 

  

 

             BASOPHIL # (test code = BA#) 0.0 10\S\3/uL 0.0-0.3                 

  

 

             IG # (test code = IG#) 0.06 10\S\3/uL 0.00-0.06                 

 

             NRBC # (test code = NRBC#) 0.00 10\S\3/uL 0.00-0.01                

 

 

             NEUTROPH % (test code = NE%) 85.2 %       35.0-73.0    H           

 

 

             LYMPH % (test code = LY%) 8.4 %        20.0-55.0    L            

 

             MONO % (test code = MO%) 5.0 %        2.5-10.0                  

 

             EOSINOPH % (test code = EO%) 0.6 %        0.0-5.0                  

 

 

             BASOPHIL % (test code = BA%) 0.3 %        0.0-2.0                  

 

 

             IG % (test code = IG%) 0.5 %        0.0-0.8                   

 

             NRBC% (test code = NRBC%) 0.0 %        0.0-0.2                   

 

             MANDIFF (test code = MDIFF) NO           NO                        



PTT (PARTIAL THROMBOPLASTIN TIME)2019 17:26:00





             Test Item    Value        Reference Range Interpretation Comments

 

             PTT (test code = 31.9 s       20.2-38.0                 



             PTT)                                                

 

             PTTH (test code = **** To monitor the                           



             PTTH)        effectiveness of heparin,                           



                          we offer the Anti-Xa                           



                          (Heparin Assay). It can be                           



                          used for either                           



                          unfractionated or LMW                           



                          Heparin. Order Code is                           



                          ANTI-XA ****                           



PROTHROMBIN [id number] 17:26:00





             Test Item    Value        Reference Range Interpretation Comments

 

             PT (test code = 12.0 s       9.8-13.6                  



             TT)                                                 

 

             INR (test code = 1.1                                    



             INR)                                                

 

             INRH (test code =      **** SUGGESTED                           



             INRH)        THERAPEUTIC RANGE FOR INR:                           



                          ****    2.5 - 3.5 **  For                           



                          Patients with Prosthetic                           



                          Valves or Patients with                           



                                          recurrent                           



                          Thromboembolic Events **                           



                          2.0 - 3.0 ** For Most Other                           



                          Applications **                           



XR CHEST 1 VIEW [id number] 17:04:28Portable AP chest, 1 viewLocation 
Code: T4UJLZODPA HISTORY: Chest painCOMPARISON: NoneCOMMENT: Mild atelectasis is
present within the lung bases. The costophrenic angles aresharp. The 
cardiomediastinalsilhouette is unremarkable. The bones are intact.IMPRESSION: 
Mild bibasilar atelectasis. Otherwise, no acute abnormalityCHEM [id number] 
16:51:00





             Test Item    Value        Reference Range Interpretation Comments

 

             Creatinine Lvl (test code = Creatinine 1.15         0.50-1.40      

           



             Lvl)                                                



[facility]2017-01-24 16:51:00





             Test Item    Value        Reference Range Interpretation Comments

 

             BUN (test code = BUN) 16           -22                      



[facility]2017-01-24 16:51:00





             Test Item    Value        Reference Range Interpretation Comments

 

             Chloride Lvl (test code = Chloride Lvl) 109                  

            



[facility]2017-01-24 16:51:00





             Test Item    Value        Reference Range Interpretation Comments

 

             Potassium Lvl (test code = Potassium 4.3          3.5-5.1          

         



             Lvl)                                                



[facility]2017-01-24 16:51:00





             Test Item    Value        Reference Range Interpretation Comments

 

             Sodium Lvl (test code = Sodium Lvl) 144          135-145           

        



[facility]2017-01-24 16:51:00





             Test Item    Value        Reference Range Interpretation Comments

 

             CO2 (test code = CO2) 28           -                     



[facility]2017-01-24 16:51:00





             Test Item    Value        Reference Range Interpretation Comments

 

             Calcium Lvl (test code = Calcium Lvl) 8.4          8.5-10.5        

          



[facility]2017-01-24 16:51:00





             Test Item    Value        Reference Range Interpretation Comments

 

             AGAP (test code = AGAP) 11.3         10.0-20.0                 



[facility]2017-01-24 16:51:00





             Test Item    Value        Reference Range Interpretation Comments

 

             Glucose Lvl (test code = Glucose Lvl) 109          70-99           

          



[facility]2017-01-24 16:51:00





             Test Item    Value        Reference Range Interpretation Comments

 

             eGFR (test code = eGFR) 52                                     



[facility]

## 2022-01-12 NOTE — ER
Nurse's Notes                                                                                     

 [facility]                                                                 

Name: Juli Rushing                                                                             

Age: 66 yrs                                                                                       

Sex: Female                                                                                       

: 1955                                                                                   

MRN: [id number]                                                                                   

Arrival Date: 2022                                                                          

Time: 17:05                                                                                       

Account#: [id number]                                                                            

Bed 18                                                                                            

Private MD:                                                                                       

Diagnosis: Unspecified systolic (congestive) heart failure;UTI/ Urinary tract infection, site not 

  specified                                                                                       

                                                                                                  

Presentation:                                                                                     

                                                                                             

17:25 Chief complaint: Patient states: "I went to see Dr. Elkins because my right ribs are     jl7 

      hurting from leaning over a chair. Dr. Elkins listened to my heart with his stethoscope      

      and said he thinks I'm in heart failure so he sent me here." Pt denies palpitations, pt     

      denies shortness of breath, pt denies chest pain, reports back pain for the last few        

      days, rib pain for 3 days, and nausea x 2 weeks. Coronavirus screen: At this time, the      

      client does not indicate any symptoms associated with coronavirus-19. Ebola Screen: No      

      symptoms or risks identified at this time. Initial Sepsis Screen: Does the patient meet     

      any 2 criteria? No. Patient's initial sepsis screen is negative. Does the patient have      

      a suspected source of infection? No. Patient's initial sepsis screen is negative. Risk      

      Assessment: Do you want to hurt yourself or someone else? Patient reports no desire to      

      harm self or others. Onset of symptoms was January 10, 2022.                                

17:25 Method Of Arrival: Wheelchair                                                           jl7 

17:25 Acuity: MIRTHA 3                                                                           jl7 

                                                                                                  

Triage Assessment:                                                                                

17:31 General: Appears in no apparent distress. uncomfortable, Behavior is calm, cooperative, jl7 

      appropriate for age. Pain: Complains of pain in right ribs Pain currently is 5 out of       

      10 on a pain scale. Cardiovascular: Patient's skin is warm and dry. Pulses are palpable     

      in right radial artery and left radial artery are 3+ in right radial artery and left        

      radial artery Rhythm is regular. Respiratory: Airway is patent Respiratory effort is        

      even, unlabored, Respiratory pattern is regular, symmetrical. Derm: Skin is pink, warm      

      \T\ dry.                                                                                    

                                                                                                  

Historical:                                                                                       

- Allergies:                                                                                      

17:31 cefepime;                                                                               jl7 

17:31 PENICILLINS;                                                                            jl7 

17:31 QUINOLONES;                                                                             jl7 

- Home Meds:                                                                                      

17:31 allopurinol 100 mg Oral tab 1 tab once daily [Active]; atorvastatin 10 mg Oral tab 1    jl7 

      tab once daily [Active]; bumetanide 2 mg Oral tab 1 tab once daily [Active]; carvedilol     

      3.125 mg Oral tab 1 tab 2 times per day [Active]; Colace 100 mg Oral cap 1 cap once         

      daily [Active]; digoxin 125 mcg (0.125 mg) Oral tab 1 tab once daily [Active]; Eliquis      

      5 mg Oral tab 1 tab 2 times per day [Active]; gabapentin 300 mg Oral cap 1 cap BID          

      [Active]; midodrine 5 mg Oral tab 2 tabs 3 times per day [Active]; Osteo Bi-Flex Triple     

      Strength 750 mg-644 mg- 30 mg-1 mg Oral tab [Active]; sevelamer carbonate 800 mg Oral       

      tab 2 tabs 3 times per day [Active]; tramadol 100 mg Oral Tb24 1 tab once daily             

      [Active];                                                                                   

- PMHx:                                                                                           

17:31 Dialysis; High Cholesterol; Hypertensive disorder; stage 4 kidney failure; Atrial       jl7 

      fibrillation;                                                                               

                                                                                                  

- Immunization history:: Client reports having NOT received the Covid vaccine.                    

- Social history:: Smoking status: Patient denies any tobacco usage or history of.                

                                                                                                  

                                                                                                  

Screenin:00 Abuse screen: Denies threats or abuse. Denies injuries from another. Nutritional        kd3 

      screening: No deficits noted. Tuberculosis screening: No symptoms or risk factors           

      identified. Fall Risk IV access (20 points).                                                

                                                                                                  

Vital Signs:                                                                                      

17:25  / 49; Pulse 81; Resp 20; Temp 97.9; Pulse Ox 100% on 2 lpm NC; Weight 118.84 kg; jl7 

      Height 5 ft. 7 in. (170.18 cm); Pain 5/10;                                                  

17:25 Body Mass Index 41.03 (118.84 kg, 170.18 cm)                                            jl7 

                                                                                                  

ED Course:                                                                                        

17:05 Patient arrived in ED.                                                                  am2 

17:31 Triage completed.                                                                       jl7 

17:31 Arm band placed on right wrist.                                                         jl7 

20:01 Florence Nunez, RN is Primary Nurse.                                                    kd3 

20:05 Bry Centeno MD is Attending Physician.                                                sp3 

20:41 XRAY Chest (1 view) In Process Unspecified.                                             EDMS

23:00 Patient has correct armband on for positive identification. Placed in gown. Bed in low  kd3 

      position. Call light in reach. Side rails up X 1.                                           

23:00 No provider procedures requiring assistance completed. IV discontinued.                 kd3 

                                                                                                  

Administered Medications:                                                                         

23:00 Drug: Zofran (Ondansetron) 8 mg Route: IVP; Site: right antecubital;                    kd3 

                                                                                                  

                                                                                                  

Outcome:                                                                                          

22:37 Discharge ordered by MD.                                                                sp3 

23:00 Discharged to home ambulatory.                                                          kd3 

23:00 Condition: stable                                                                           

23:00 Discharge instructions given to patient.                                                    

23:10 Patient left the ED.                                                                    kd3 

                                                                                                  

Addendum:                                                                                         

2022                                                                                        

     07:31 Addendum: Culture Results: Positive urine culture. No further action required. Bacteria e
b

           sensitive to prescribed antibiotic.                                                    

                                                                                                  

Signatures:                                                                                       

Dispatcher [employer]                           Terry Ramsey, RN                        RN   jl7                                                  

Basia Sears Elizabeth eb Patel, Setul, MD MD   sp3                                                  

Florence Nunez RN                      RN   kd3                                                  

                                                                                                  

**************************************************************************************************

## 2022-01-12 NOTE — EDPHYS
Physician Documentation                                                                           

 [facility]                                                                 

Name: Juli Rushing                                                                             

Age: 66 yrs                                                                                       

Sex: Female                                                                                       

: 1955                                                                                   

MRN: [id number]                                                                                   

Arrival Date: 2022                                                                          

Time: 17:05                                                                                       

Account#: [id number]                                                                            

Bed 18                                                                                            

Private MD:                                                                                       

ED Physician Bry Centeno                                                                         

HPI:                                                                                              

                                                                                             

20:37 This 66 yrs old Female presents to ER via Wheelchair with complaints of Palpitations,   sp3 

      rib pain.                                                                                   

20:37 6-year-old female with a history of hypertension, stage IV kidney disease on end-stage  sp3 

      renal disease dialysis, hyper lipidemia who presents to the ED for right rib pain and       

      sent by her PCP for possible fluid overload/congestive heart failure. Patient initially     

      was seen in her PCPs office for this and she states that he "listen to her and then         

      stated that she may be in heart failure" and so sent her to the ED for further              

      evaluation. She states that she has right rib pain secondary to leaning on a chair in       

      an abnormal fashion. No direct trauma or fall reported. No back pain, substernal chest      

      pain, shortness of breath, cough, URI symptoms, headache, neck pain, low back pain,         

      abdominal pain, nausea, vomiting, diarrhea, syncope, near syncope, neuro symptoms,          

      rash, known sick contacts, any other ROS at this time..                                     

                                                                                                  

Historical:                                                                                       

- Allergies:                                                                                      

17:31 cefepime;                                                                               jl7 

17:31 PENICILLINS;                                                                            jl7 

17:31 QUINOLONES;                                                                             jl7 

- Home Meds:                                                                                      

17:31 allopurinol 100 mg Oral tab 1 tab once daily [Active]; atorvastatin 10 mg Oral tab 1    jl7 

      tab once daily [Active]; bumetanide 2 mg Oral tab 1 tab once daily [Active]; carvedilol     

      3.125 mg Oral tab 1 tab 2 times per day [Active]; Colace 100 mg Oral cap 1 cap once         

      daily [Active]; digoxin 125 mcg (0.125 mg) Oral tab 1 tab once daily [Active]; Eliquis      

      5 mg Oral tab 1 tab 2 times per day [Active]; gabapentin 300 mg Oral cap 1 cap BID          

      [Active]; midodrine 5 mg Oral tab 2 tabs 3 times per day [Active]; Osteo Bi-Flex Triple     

      Strength 750 mg-644 mg- 30 mg-1 mg Oral tab [Active]; sevelamer carbonate 800 mg Oral       

      tab 2 tabs 3 times per day [Active]; tramadol 100 mg Oral Tb24 1 tab once daily             

      [Active];                                                                                   

- PMHx:                                                                                           

17:31 Dialysis; High Cholesterol; Hypertensive disorder; stage 4 kidney failure; Atrial       jl7 

      fibrillation;                                                                               

                                                                                                  

- Immunization history:: Client reports having NOT received the Covid vaccine.                    

- Social history:: Smoking status: Patient denies any tobacco usage or history of.                

                                                                                                  

                                                                                                  

ROS:                                                                                              

20:39 Constitutional: Negative for fever, chills, and weight loss, Eyes: Negative for injury, sp3 

      pain, redness, and discharge, ENT: Negative for injury, pain, and discharge, Neck:          

      Negative for injury, pain, and swelling, Cardiovascular: Negative for chest pain,           

      palpitations, and edema, Abdomen/GI: Negative for abdominal pain, nausea, vomiting,         

      diarrhea, and constipation, Back: Negative for injury and pain, Skin: Negative for          

      injury, rash, and discoloration, Neuro: Negative for headache, weakness, numbness,          

      tingling, and seizure, Psych: Negative for depression, anxiety, suicide ideation,           

      homicidal ideation, and hallucinations, Allergy/Immunology: Negative for hives, rash,       

      and allergies, Endocrine: Negative for neck swelling, polydipsia, polyuria, polyphagia,     

      and marked weight changes.                                                                  

20:39 All other systems are negative.                                                             

                                                                                                  

Exam:                                                                                             

20:40 Constitutional:  This is a well developed, well nourished patient who is awake, alert,  sp3 

      and in no acute distress. Head/Face:  Normocephalic, atraumatic. Eyes:  Pupils equal        

      round and reactive to light, extra-ocular motions intact.  Lids and lashes normal.          

      Conjunctiva and sclera are non-icteric and not injected.  Cornea within normal limits.      

      Periorbital areas with no swelling, redness, or edema. ENT:  Nares patent. No nasal         

      discharge, no septal abnormalities noted.  External auditory canals are clear.              

      Oropharynx with no redness, swelling, or masses, exudates, or evidence of obstruction,      

      uvula midline.  Mucous membranes moist. Neck:  Trachea midline, no thyromegaly or           

      masses palpated, and no cervical lymphadenopathy.  Supple, full range of motion without     

      nuchal rigidity, or vertebral point tenderness.  No Meningismus. Cardiovascular:            

      Regular rate and rhythm with a normal S1 and S2.  No gallops, murmurs, or rubs.  Normal     

      PMI, no JVD.  No pulse deficits. Abdomen/GI:  Soft, non-tender, with normal bowel           

      sounds.  No distension or tympany.  No guarding or rebound.  No evidence of tenderness      

      throughout. Skin:  Warm, dry with normal turgor.  Normal color with no rashes, no           

      lesions, and no evidence of cellulitis. MS/ Extremity:  Pulses equal, no cyanosis.          

      Neurovascular intact.  Full, normal range of motion. Neuro:  Awake and alert, GCS 15,       

      oriented to person, place, time, and situation.  Cranial nerves II-XII grossly intact.      

      Motor strength 5/5 in all extremities.  Sensory grossly intact.  Cerebellar exam            

      normal.  Normal gait.                                                                       

20:40 Chest/axilla: Patient has mild point chest pain on 2 ribs on the right side.  No subcu      

      emphysema or other rib crepitus or other abnormality is noted..                             

20:40 Respiratory: No respiratory distress.  Mild rales noted bilaterally.  Bilateral lower       

      extremity swelling is slightly worse than baseline per patient..                            

21:00 ECG was reviewed by the Attending Physician. EKG demonstrates atrial fibrillation at 74 sp3 

      bpm with normal QRS, normal axis, with no ST/T changes indicative of ischemia.              

                                                                                                  

Vital Signs:                                                                                      

17:25  / 49; Pulse 81; Resp 20; Temp 97.9; Pulse Ox 100% on 2 lpm NC; Weight 118.84 kg; jl7 

      Height 5 ft. 7 in. (170.18 cm); Pain 5/10;                                                  

17:25 Body Mass Index 41.03 (118.84 kg, 170.18 cm)                                            jl7 

                                                                                                  

MDM:                                                                                              

20:15 Patient medically screened.                                                             sp3 

20:43 Data reviewed: vital signs, nurses notes. ED course: Patient was at dialysis yesterday  sp3 

      and is due for dialysis tomorrow. I do not believe patient is heavily volume overloaded     

      at this time. Will obtain chest x-ray, BNP labs as well as routine labs. I am not           

      suspicious for acute coronary syndrome, vascular compromise, infection, sepsis, or any      

      other critical findings at this time. Will discharge patient home if work-up is             

      negative even if patient is in mild failure secondary to her getting dialysis tomorrow..    

22:35 ED course: Had extensive discussion with patient who really wants to go home.           sp3 

      Laboratory work does not show any significant abnormalities other than the BNP level.       

      Respiratory status clinically is not severe. Patient has mild shortness of breath but       

      she states that that is her baseline at her best day. She has dialysis tomorrow and         

      does not wish to be in the hospital today. Her urine also demonstrated continued            

      infection. Culture has been ordered. We will place her on nitrofurantoin and have her       

      follow-up with her PCP on this. I also told patient that she may return at any time if      

      her breathing gets worse and she wants to be admitted to the hospital for which her and     

      her family member in the room acknowledged and were appreciative. Will discharge home       

      at this time..                                                                              

                                                                                                  

                                                                                             

20:16 Order name: Basic Metabolic Panel; Complete Time: 22:23                                 3 

                                                                                             

20:16 Order name: CBC with Diff; Complete Time: 21:50                                         [facility] 

                                                                                             

20:16 Order name: LFT's; Complete Time: 22:23                                                 3 

                                                                                             

20:16 Order name: Magnesium; Complete Time: 22:23                                             3 

                                                                                             

20:16 Order name: NT PRO-BNP; Complete Time: 22:23                                            [facility] 

                                                                                             

20:16 Order name: PT-INR; Complete Time: 21:50                                                3 

                                                                                             

20:16 Order name: Troponin HS; Complete Time: 22:23                                           3 

                                                                                             

20:16 Order name: XRAY Chest (1 view); Complete Time: 21:50                                   3 

                                                                                             

21:01 Order name: UA                                                                          [facility] 

                                                                                             

21:01 Order name: UA MICROSCOPIC                                                              [facility] 

                                                                                             

21:14 Order name: Urinalysis W/Microscopic; Complete Time: 22:23                              [facility]

                                                                                             

22:16 Order name: Urine Culture                                                               [facility]

                                                                                             

20:16 Order name: EKG; Complete Time: 20:16                                                   [facility] 

                                                                                             

20:16 Order name: Cardiac monitoring                                                          [facility] 

                                                                                             

20:16 Order name: EKG - Nurse/Tech                                                            [facility] 

                                                                                             

20:16 Order name: IV Saline Lock                                                              [facility] 

                                                                                             

20:16 Order name: Labs collected and sent                                                     [facility] 

                                                                                             

20:16 Order name: O2 Per Protocol                                                             [facility] 

                                                                                             

20:16 Order name: O2 Sat Monitoring                                                           3 

                                                                                                  

Administered Medications:                                                                         

23:00 Drug: Zofran (Ondansetron) 8 mg Route: IVP; Site: right antecubital;                    kd3 

                                                                                                  

                                                                                                  

Disposition Summary:                                                                              

22 22:37                                                                                    

Discharge Ordered                                                                                 

      Location: Home                                                                          sp3 

      Condition: Stable                                                                       sp3 

      Diagnosis                                                                                   

        - Unspecified systolic (congestive) heart failure                                     sp3 

        - UTI/ Urinary tract infection, site not specified                                    sp3 

      Discharge Instructions:                                                                     

        - Discharge Summary Sheet                                                             sp3 

        - Urinary Tract Infection, Adult                                                      sp3 

        - Heart Failure Exacerbation                                                          sp3 

      Forms:                                                                                      

        - Medication Reconciliation Form                                                      sp3 

        - Thank You Letter                                                                    sp3 

        - Antibiotic Education                                                                sp3 

        - Prescription Opioid Use                                                             sp3 

      Prescriptions:                                                                              

        - Macrobid 100 mg Oral Capsule                                                             

            - take 1 capsule by ORAL route every 12 hours for 10 days; 20 capsule; Refills:   sp3 

      0, Product Selection Permitted                                                              

Signatures:                                                                                       

Dispatcher [employer]                           EDMS                                                 

Terry Ernandez RN                        RN   jl7                                                  

Bry Centeno MD MD   sp3                                                  

Florence Nunez RN                      RN   kd3                                                  

                                                                                                  

Corrections: (The following items were deleted from the chart)                                    

21:14 21:02 Urinalysis ordered. EDMS                                                          EDMS

21:14 21:02 Urine Microscopic Only ordered. EDMS                                              EDMS

                                                                                                  

**************************************************************************************************

## 2022-02-23 ENCOUNTER — HOSPITAL ENCOUNTER (EMERGENCY)
Dept: HOSPITAL 97 - ER | Age: 67
Discharge: HOME | End: 2022-02-23
Payer: COMMERCIAL

## 2022-02-23 VITALS — DIASTOLIC BLOOD PRESSURE: 71 MMHG | SYSTOLIC BLOOD PRESSURE: 116 MMHG | OXYGEN SATURATION: 99 %

## 2022-02-23 VITALS — TEMPERATURE: 97.9 F

## 2022-02-23 DIAGNOSIS — Z88.8: ICD-10-CM

## 2022-02-23 DIAGNOSIS — I13.2: Primary | ICD-10-CM

## 2022-02-23 DIAGNOSIS — Z88.5: ICD-10-CM

## 2022-02-23 DIAGNOSIS — Z88.0: ICD-10-CM

## 2022-02-23 LAB
BUN BLD-MCNC: 54 MG/DL (ref 7–18)
GLUCOSE SERPLBLD-MCNC: 114 MG/DL (ref 74–106)
HCT VFR BLD CALC: 42 % (ref 36–45)
LYMPHOCYTES # SPEC AUTO: 1.4 K/UL (ref 0.7–4.9)
MAGNESIUM SERPL-MCNC: 2.7 MG/DL (ref 1.8–2.4)
PMV BLD: 7.9 FL (ref 7.6–11.3)
POTASSIUM SERPL-SCNC: 5.5 MMOL/L (ref 3.5–5.1)
RBC # BLD: 4.1 M/UL (ref 3.86–4.86)

## 2022-02-23 PROCEDURE — 83735 ASSAY OF MAGNESIUM: CPT

## 2022-02-23 PROCEDURE — 85025 COMPLETE CBC W/AUTO DIFF WBC: CPT

## 2022-02-23 PROCEDURE — 99284 EMERGENCY DEPT VISIT MOD MDM: CPT

## 2022-02-23 PROCEDURE — 36415 COLL VENOUS BLD VENIPUNCTURE: CPT

## 2022-02-23 PROCEDURE — 80048 BASIC METABOLIC PNL TOTAL CA: CPT

## 2022-02-23 NOTE — EDPHYS
Physician Documentation                                                                           

 [facility]                                                                 

Name: Juli Rushing                                                                             

Age: 66 yrs                                                                                       

Sex: Female                                                                                       

: 1955                                                                                   

MRN: [id number]                                                                                   

Arrival Date: 2022                                                                          

Time: 12:33                                                                                       

Account#: [id number]                                                                            

Bed 26                                                                                            

Private MD: Ciera Rosenthal                                                            

ED Physician Eliecer Chu                                                                    

HPI:                                                                                              

                                                                                             

13:53 This 66 yrs old Female presents to ER via Ambulatory with complaints of Abnormal Lab    ma2 

      Results.                                                                                    

13:53 This is an ESRD patient who is on scheduled hemodialysis, had dialysis yesterday. After ma2 

      dialysis today due to routine blood work were potassium came back 7. PCP called her         

      today to go to ER to get a recheck of her potassium. Patient does not have any              

      symptoms..                                                                                  

                                                                                                  

Historical:                                                                                       

- Allergies:                                                                                      

13:06 cefepime;                                                                               ap3 

13:06 PENICILLINS;                                                                            ap3 

13:06 QUINOLONES;                                                                             ap3 

13:06 Codeine;                                                                                ap3 

- PMHx:                                                                                           

13:06 Atrial fibrillation; Dialysis; High Cholesterol; Hypertensive disorder; stage 4 kidney  ap3 

      failure;                                                                                    

13:08 Congestive heart failure;                                                               ap3 

                                                                                                  

- Immunization history:: Client reports having NOT received the Covid vaccine.                    

  Pneumococcal vaccine is up to date, Flu vaccine is up to date.                                  

- Social history:: Smoking status: Patient denies any tobacco usage or history of.                

- Family history:: not pertinent.                                                                 

                                                                                                  

                                                                                                  

ROS:                                                                                              

13:53 Constitutional: Negative for fever, chills, and weight loss.                            ma2 

13:53 All other systems are negative.                                                             

                                                                                                  

Exam:                                                                                             

13:53 Constitutional:  This is a well developed, well nourished patient who is awake, alert,  ma2 

      and in no acute distress. Head/Face:  Normocephalic, atraumatic. Eyes:  Pupils equal        

      round and reactive to light, extra-ocular motions intact.  Lids and lashes normal.          

      Conjunctiva and sclera are non-icteric and not injected.  Cornea within normal limits.      

      Periorbital areas with no swelling, redness, or edema. ENT:  Nares patent. No nasal         

      discharge, no septal abnormalities noted.  Tympanic membranes are normal and external       

      auditory canals are clear.  Oropharynx with no redness, swelling, or masses, exudates,      

      or evidence of obstruction, uvula midline.  Mucous membranes moist. Neck:  Trachea          

      midline, no thyromegaly or masses palpated, and no cervical lymphadenopathy.  Supple,       

      full range of motion without nuchal rigidity, or vertebral point tenderness.  No            

      Meningismus. Chest/axilla:  Left upper chest dialysis catheter, otherwise normal chest      

      wall appearance and motion.  Nontender with no deformity.  No lesions are appreciated.      

      Cardiovascular:  Regular rate and rhythm with a normal S1 and S2.  No gallops, murmurs,     

      or rubs.  Normal PMI, no JVD.  No pulse deficits. Respiratory:  Lungs have equal breath     

      sounds bilaterally, clear to auscultation and percussion.  No rales, rhonchi or wheezes     

      noted.  No increased work of breathing, no retractions or nasal flaring. Abdomen/GI:        

      Soft, non-tender, with normal bowel sounds.  No distension or tympany.  No guarding or      

      rebound.  No evidence of tenderness throughout. MS/ Extremity:  Pulses equal, no            

      cyanosis.  Neurovascular intact.  Full, normal range of motion. Neuro:  Awake and           

      alert, GCS 15, oriented to person, place, time, and situation.  Cranial nerves II-XII       

      grossly intact.  Motor strength 5/5 in all extremities.  Sensory grossly intact.            

      Cerebellar exam normal.  Normal gait.                                                       

                                                                                                  

Vital Signs:                                                                                      

13:07  / 72; Pulse 79; Resp 17; Temp 97.9; Pulse Ox 99% on 2 lpm NC; Weight 105.69 kg;  ap3 

      Height 5 ft. 7 in. (170.18 cm);                                                             

14:00  / 55; Pulse 80; Resp 18; Pulse Ox 100% on 2 lpm NC;                              lr4 

14:16  / 52; Pulse 71; Resp 17; Pulse Ox 100% on 2 lpm NC;                              lr4 

15:42  / 55; Pulse 64; Resp 18; Pulse Ox 100% ;                                         lr4 

16:32  / 71; Pulse 71; Resp 18; Pulse Ox 99% ;                                          lr4 

13:07 Body Mass Index 36.49 (105.69 kg, 170.18 cm)                                            ap3 

                                                                                                  

MDM:                                                                                              

16:02 Differential Diagnosis Hyperkalemia versus hyperkalemia versus electrolyte abnormality, ma2 

      lab work resulted and shows potassium 5.5, I explained to patient that prior result of      

      potassium of 7 is likely false due to thrombosed blood, she has dialysis tomorrow,          

      patient has no symptoms at this time. Data reviewed: vital signs, nurses notes.             

      Counseling: I had a detailed discussion with the patient and/or guardian regarding: the     

      historical points, exam findings, and any diagnostic results supporting the                 

      discharge/admit diagnosis, the presence of at least one elevated blood pressure reading     

      (>120/80) during this emergency department visit, the need for outpatient follow up.        

      Response to treatment: the patient's symptoms have markedly improved after treatment.       

16:04 Patient medically screened.                                                             ma                                                                                             

13:29 Order name: Basic Metabolic Panel; Complete Time: 15:12                                                                                                                              

13:29 Order name: CBC with Diff; Complete Time: 15:12                                                                                                                                      

13:29 Order name: Magnesium; Complete Time: 15:12                                                                                                                                          

13:29 Order name: EKG; Complete Time: 13:29                                                                                                                                                

13:29 Order name: Cardiac monitoring; Complete Time: 14:15                                                                                                                                 

13:29 Order name: EKG - Nurse/Tech; Complete Time: 14:15                                      ma                                                                                             

13:29 Order name: IV Saline Lock; Complete Time: 13:53                                                                                                                                     

13:29 Order name: Labs collected and sent; Complete Time: 13:53                                                                                                                            

13:29 Order name: O2 Per Protocol; Complete Time: 13:53                                       ma                                                                                             

13:29 Order name: O2 Sat Monitoring; Complete Time: 13:53                                      

                                                                                                  

Administered Medications:                                                                         

No medications were administered                                                                  

                                                                                                  

                                                                                                  

Disposition Summary:                                                                              

22 16:04                                                                                    

Discharge Ordered                                                                                 

      Location: Home                                                                          ma2 

      Condition: Stable                                                                       ma2 

      Diagnosis                                                                                   

        - End stage renal disease                                                             ma2 

      Followup:                                                                               ma2 

        - With: Private Physician                                                                  

        - When: Tomorrow                                                                           

        - Reason: If symptoms return                                                               

      Discharge Instructions:                                                                     

        - Discharge Summary Sheet                                                             ma2 

        - Chronic Kidney Disease, Adult, Easy-to-Read                                         ma2 

      Forms:                                                                                      

        - Medication Reconciliation Form                                                      ma2 

        - Thank You Letter                                                                    ma2 

        - Antibiotic Education                                                                ma2 

        - Prescription Opioid Use                                                             ma2 

Signatures:                                                                                       

Dispatcher [employer]                           EDEliecer López MD MD   ma2                                                  

Basia Coulter RN                    RN   ap3                                                  

                                                                                                  

**************************************************************************************************

## 2022-02-23 NOTE — XMS REPORT
Continuity of Care Document

                          Created on:2022



Patient:SUZI SEARS

Sex:Female

:1955

External Reference #:[id number]





Demographics







                          Address                   [address] [place] [address] [address]



                                                    [place] [address]

 

                          Home Phone                [phone]

 

                          Work Phone                [phone]

 

                          Email Address             [email or web address]

 

                          Preferred Language        English

 

                          Marital Status            Unknown

 

                          Orthodoxy Affiliation     Unknown

 

                          Race                      Unknown

 

                          Additional Race(s)        Unavailable

 

                          Ethnic Group              Unknown









Author







                          Organization              [facility]

t

 

                          Address                   [address]



                                                    [place] [address]

 

                          Phone                     [phone]









Support







                Name            Relationship    Address         Phone

 

                EDUARDO SEARS      FAUSTO              [address] [phone]



                                                [place] [address] 

 

                EDUARDO SEARS      FAUSTO              Unavailable     [phone]









Care Team Providers







                    Name                Role                Phone

 

                    AHMED               Attending Clinician Unavailable

 

                    ANABEL              Attending Clinician Unavailable

 

                    TONO           Attending Clinician Unavailable

 

                    MD ALIYA LAMAR Attending Clinician Unavailable

 

                    LAKHWINDER           Attending Clinician Unavailable

 

                    RADHA              Attending Clinician Unavailable

 

                    NAAIS          Attending Clinician Unavailable

 

                    MD ANAIS      Attending Clinician Unavailable

 

                    MD ANABEL  OBIOMA  Attending Clinician Unavailable

 

                    MD LATISHA POWER Attending Clinician Unavailable

 

                    DANIELLA               Attending Clinician Unavailable

 

                    CHAD              Attending Clinician Unavailable

 

                    DR KEIKO            Attending Clinician Unavailable

 

                    TONO           Admitting Clinician Unavailable

 

                    MD ALIYA LAMAR Admitting Clinician Unavailable

 

                    ANAIS          Admitting Clinician Unavailable

 

                    MD ANAIS      Admitting Clinician Unavailable

 

                    ANABEL              Admitting Clinician Unavailable

 

                    MD JOSEY BECKHAM        Admitting Clinician Unavailable

 

                    MD LATISHA POWER Admitting Clinician Unavailable

 

                    DR KEIKO            Admitting Clinician Unavailable









Problems







       Condition Condition Condition Status Onset  Resolution Last   Treating Co

mments 

Source



       Name   Details Category        Date   Date   Treatment Clinician        



                                                 Date                 

 

       SOLO           Diagnosis Active 2021               Mem

[facility]



                                             12:03:00               l



                SOLO                00:00:                             Barron



                                   00                                 



                                                                      



              Active                                                  



                                                                      



                                                                      



              2021                                                  



                                                                      



                                                                      



                                                                      



                                                                      



                                                                      



                                                                      



                                                                      



                                                                      



                                                                       



              [place]                                                  



                                                                      



                                                                      

 

       COLON         Diagnosis Active 2017               Mem

oria



       CANCER                                05:49:00               l



       SCREENING-   COLON               00:00:                             Meredith

nn



       Z12.11 CANCER               00                                 



              SCREENING-                                                  



              Z12.11                                                  



                                                                      



                                                                      



              Active                                                  



                                                                      



                                                                      



              2017                                                  



                                                                      



                                                                      



                                                                      



                                                                      



                                                                      



                                                                      



                                                                      



                                                                      



                                                                       



              [place]                                                  



                                                                      



                                                                      

 

       R92.1 -        Diagnosis Active 2016               Me

moria



       MAMMOGRAPH                                15:55:00               l



       IC       R92.1 -               00:01:                             Barron



       CALCIFCN MAMMOGRAPH               00                                 



       FOUND ON IC                                                      



              CALCIFCN                                                  



              FOUND ON                                                  



                                                                      



                                                                      



              Active                                                  



                                                                      



                                                                      



              2016                                                  



                                                                      



                                                                      



                                                                      



                                                                      



                                                                      



                                                                      



                                                                      



                                                                      



                                                                       



              [facility]                                                  



              [facility]                                                    



                                                                      



                                                                      

 

       Z12.31 -        Diagnosis Active 2016               M

emoria



       ENCNTR                      -          07:08:00               l



       SCREEN   Z12.31 -               00:01:                             Donny martinez



       MAMMOGRAM ENCNTR               00                                 



       FOR MA SCREEN                                                  



              MAMMOGRAM                                                  



              FOR MA                                                  



                                                                      



                                                                      



              Active                                                  



                                                                      



                                                                      



              2016                                                  



                                                                      



                                                                      



                                                                      



                                                                      



                                                                      



                                                                      



                                                                      



                                                                      



                                                                       



              [employer] [employer]                                                  



              [facility]                                                    



                                                                      



                                                                      

 

       RT WRIST        Diagnosis Active 2017               M

emoria



       DISTAL                      1-          02:03:00               l



       RADIUS   RT WRIST               09:00:                             Donny martinez



       CLSD FX DISTAL               00                                 



              RADIUS                                                  



              CLSD FX                                                  



                                                                      



                                                                      



              Active                                                  



                                                                      



                                                                      



              2016                                                  



                                                                      



                                                                      



                                                                      



                                                                      



                                                                      



                                                                      



                                                                      



                                                                      



                     [facility]                                                  



              [place]                                                  



              [facility]                                                  



              [facility]                                                   



                                                                      



                                                                      

 

       Abnormal        Problem Active 2021               Mem

[facility]



       glucose                      7-02          22:17:41               l



       level    Abnormal               00:00:                             Donny martinez



       (finding) glucose               00                                 



              level                                                   



              (finding)                                                  



                                                                      



                                                                      



              Active                                                  



                                                                      



                                                                      



              2015                                                  



                                                                      



                                                                      



               Problem                                                  



                                                                      



                                                                      



              2021                                                  



                                                                      



                                                                      



               Data                                                   



              migrated                                                  



              from [facility]                                                  



              on 7/8/15.                                                  



                                                                      



                                                                      



                                                                    



              Medical                                                  



              Group,                                                  



              [facility],                                                  



              [facility]                                                  



              [facility],                                                  



              [facility]                                                     



              [facility]                                                  



              [facility]                                                  



              [place]                                                  



              [facility]                                                  



              [facility],                                                  



              [place]                                                  



                                                                      



                                                                      

 

       Lymphedema        Problem Active 2020               M

em[facility]



       (disorder)                      2-          00:38:51               l



                                   00:00:                             Barron



              Lymphedema               00                                 



              (disorder)                                                  



                                                                      



                                                                      



               Active                                                  



                                                                      



                                                                      



              2014                                                  



                                                                      



                                                                      



               Problem                                                  



                                                                      



                                                                      



              2020                                                  



                                                                      



                                                                      



               Data                                                   



              migrated                                                  



              from [facility]                                                  



              on                                                      



              5/30/15.                                                  



                                                                      



                                                                      



               Medical                                                  



              Group,                                                  



              [facility],                                                  



              [facility]                                                  



              [facility],                                                  



              [facility]                                                     



              [facility]                                                  



              [facility]                                                  



              [place]                                                  



              [facility]                                                  



              [facility],                                                  



              [place]                                                  



                                                                      



                                                                      

 

       Obstructiv        Problem Active 2021               M

emoria



       e sleep                      2-04          22:17:41               l



       apnea                       00:00:                             Vermillion



       syndrome Obstructiv               00                                 



       (disorder) e sleep                                                  



              apnea                                                   



              syndrome                                                  



              (disorder)                                                  



                                                                      



                                                                      



               Active                                                  



                                                                      



                                                                      



              2014                                                  



                                                                      



                                                                      



               Problem                                                  



                                                                      



                                                                      



              2021                                                  



                                                                      



                                                                      



               Data                                                   



              migrated                                                  



              from [facility]                                                  



              on                                                      



              5/30/15.                                                  



                                                                      



                                                                      



               Medical                                                  



              Group,                                                  



              [facility],                                                  



              [facility] [employer]                                                  



              [facility],                                                  



              [facility]                                                     



              [facility]                                                  



              [facility]                                                  



              [place]                                                  



              [facility]                                                  



              [facility],                                                  



              [place]                                                  



                                                                      



                                                                      

 

       Hypothyroi        Problem Active 2013-0        2019-10-19               M

emoria



       dism                                  21:25:36               l



       (disorder)                      00:00:                             Donny

n



              Hypothyroi               00                                 



              dism                                                    



              (disorder)                                                  



                                                                      



                                                                      



               Active                                                  



                                                                      



                                                                      



              2013                                                  



                                                                      



                                                                      



               Problem                                                  



                                                                      



                                                                      



              10/19/2019                                                  



                                                                      



                                                                      



               Data                                                   



              migrated                                                  



              from [employer]                                                  



              [employer]                                                  



              on                                                      



              5/30/15.                                                  



                                                                      



                                                                      



               Medical                                                  



              Group,                                                  



              [facility]                                                    



              [facility],                                                  



              [employer] [employer]                                                  



              [facility],                                                  



              [facility]                                                     



              [facility]                                                  



              [facility]                                                  



              [place]                                                  



              [facility]                                                  



              [facility],                                                  



              [place]                                                  



                                                                      



                                                                      

 

       Depressive        Problem Active 2021               [facility]

[facility]



       disorder                      4-24          22:17:41               l



       (disorder)                      00:00:                             Donny

n



              Depressive               00                                 



              disorder                                                  



              (disorder)                                                  



                                                                      



                                                                      



               Active                                                  



                                                                      



                                                                      



              2013                                                  



                                                                      



                                                                      



               Problem                                                  



                                                                      



                                                                      



              2021                                                  



                                                                      



                                                                      



               Data                                                   



              migrated                                                  



              from [employer]                                                  



              [employer]                                                  



              on                                                      



              5/30/15.                                                  



                                                                      



                                                                      



               Medical                                                  



              Group,                                                  



              [facility]                                                    



              [facility],                                                  



              [facility]                                                  



              [facility],                                                  



              [facility]                                                     



              [facility]                                                  



              [facility]                                                  



              [place]                                                  



              [facility]                                                  



              [facility],                                                  



              [place]                                                  



                                                                      



                                                                      

 

       Obesity        Problem Active 2016               [place]

[facility]



       (disorder)                      8-20          00:23:22               l



                Obesity               00:00:                             [place]ron



              (disorder)               00                                 



                                                                      



                                                                      



               Active                                                  



                                                                      



                                                                      



              2012                                                  



                                                                      



                                                                      



               Problem                                                  



                                                                      



                                                                      



              2016                                                  



                                                                      



                                                                      



               Data                                                   



              migrated                                                  



              from [employer]                                                  



              [employer]                                                  



              on                                                      



              5/30/15.                                                  



                                                                      



                                                                      



               [facility]                                                  



              [facility],                                                  



              [facility]                                                  



              [facility],                                                  



              [facility]                                                     



              [facility]                                                  



              [facility]                                                  



              [place]                                                  



              [facility]                                                  



              [facility]                                                   



                                                                      



                                                                      

 

       Cobalamin        Problem Active 2021               [facility]

[facility]ia



       deficiency                      7-11          22:17:41               l



       (disorder)                      00:00:                             Donny

n



              Cobalamin               00                                 



              deficiency                                                  



              (disorder)                                                  



                                                                      



                                                                      



               Active                                                  



                                                                      



                                                                      



              2012                                                  



                                                                      



                                                                      



               Problem                                                  



                                                                      



                                                                      



              2021                                                  



                                                                      



                                                                      



               Data                                                   



              migrated                                                  



              from [employer]                                                  



              [employer]                                                  



              on                                                      



              5/30/15.                                                  



                                                                      



                                                                      



               Medical                                                  



              Group,                                                  



              [facility]                                                    



              [facility],                                                  



              [facility]                                                  



              [facility],                                                  



              [facility]                                                     



              [facility]                                                  



              [facility]                                                  



              [place]                                                  



              [facility]                                                  



              [facility],                                                  



              [place]                                                  



                                                                      



                                                                      

 

       Hypertensi        Problem Active 2016               [facility]

[facility]



       ve episode                      7-10          00:23:22               l



       (disorder)                      00:00:                             Donny

n



              Hypertensi               00                                 



              ve episode                                                  



              (disorder)                                                  



                                                                      



                                                                      



               Active                                                  



                                                                      



                                                                      



              07/10/2012                                                  



                                                                      



                                                                      



               Problem                                                  



                                                                      



                                                                      



              2016                                                  



                                                                      



                                                                      



               Data                                                   



              migrated                                                  



              from [employer]                                                  



              [employer]                                                  



              on                                                      



              5/30/15.                                                  



                                                                      



                                                                      



              [facility]                                                  



              [facility],                                                  



              [facility]                                                  



              [facility],                                                  



              [facility]                                                     



              [facility]                                                  



              [facility]                                                  



              [place]                                                  



              [facility]                                                  



              [facility]                                                   



                                                                      



                                                                      

 

       Pain in        Problem                      2016               [place]

[facility]an



       wrist                                     05:02:18               l



       (finding)   Pain in                                                  [facility]

daryl



              wrist                                                   



              (finding)                                                  



                                                                      



                                                                      



                                                                      



                                                                      



                                                                      



                                                                      



                                                                      



              Problem                                                  



                                                                      



                                                                      



              2016                                                  



                                                                      



                                                                      



                                                                      



                                                                      



                                                                      



              [facility]                                                  



              [facility]                                                  



              [facility]                                                    



                                                                      



                                                                      

 

       Calcificat        Problem Resolve               2021               

[facility]



       ion of               d                    22:17:41               l



       breast                                                         [place]



       (finding) Calcificat                                                  



              ion of                                                  



              breast                                                  



              (finding)                                                  



                                                                      



                                                                      



              Resolved                                                  



                                                                      



                                                                      



                                                                      



                                                                      



               Problem                                                  



                                                                      



                                                                      



              2021                                                  



                                                                      



                                                                      



               Left                                                   



                                                                      



                                                                       



              Medical                                                  



              Group,                                                  



              [facility]                                                    



              [facility],                                                  



              [facility]                                                  



              [facility],                                                  



              [facility]                                                     



              [facility]                                                  



              [facility]                                                  



              [place]                                                  



              [facility]                                                  



              [facility],                                                  



              [place]                                                  



                                                                      



                                                                      

 

       Dysuria        Problem Resolve               2021               [facility]

[facility]



       (finding)               d                    22:17:41               l



                Dysuria                                                  [place]



              (finding)                                                  



                                                                      



                                                                      



              Resolved                                                  



                                                                      



                                                                      



                                                                      



                                                                      



               Problem                                                  



                                                                      



                                                                      



              2021                                                  



                                                                      



                                                                      



                                                                      



                                                                      



                                                                    



              Medical                                                  



              Group,                                                  



              [facility]                                                  



              [facility],                                                  



              [place]                                                  



                                                                      



                                                                      

 

       Acute         Problem Active               2020               [facility]

ia



       urinary                                    22:36:35               l



       tract    Acute                                                  [place]



       infection urinary                                                  



       (disorder) tract                                                   



              infection                                                  



              (disorder)                                                  



                                                                      



                                                                      



               Active                                                  



                                                                      



                                                                      



                                                                      



                                                                      



              Problem                                                  



                                                                      



                                                                      



              2020                                                  



                                                                      



                                                                      



                                                                      



                                                                      



                                                                    



              Medical                                                  



              Group                                                   



                                                                      



                                                                      

 

       Chronic        Problem Active               2020               [place]

milan



       renal                                     22:36:35               l



       impairment   Chronic                                                  Her

[place]



       (disorder) renal                                                   



              impairment                                                  



              (disorder)                                                  



                                                                      



                                                                      



               Active                                                  



                                                                      



                                                                      



                                                                      



                                                                      



              Problem                                                  



                                                                      



                                                                      



              2020                                                  



                                                                      



                                                                      



                                                                      



                                                                      



                                                                    



              Medical                                                  



              Group,                                                  



              OP[facility],                                                  



              [facility]                                                  



              [facility],                                                  



              [facility]                                                     



              [facility]                                                  



              [facility]                                                  



              [place]                                                  



              [facility]                                                  



              [facility],                                                  



              [place]                                                  



                                                                      



                                                                      

 

       Diabetes        Problem Active               2020               [facility]

[facility]



       mellitus                                    23:27:54               l



       (disorder)   Diabetes                                                  He

rmann



              mellitus                                                  



              (disorder)                                                  



                                                                      



                                                                      



               Active                                                  



                                                                      



                                                                      



                                                                      



                                                                      



              Problem                                                  



                                                                      



                                                                      



              2020                                                  



                                                                      



                                                                      



                                                                      



                                                                      



                                                                    



              Medical                                                  



              Group,                                                  



              [facility]                                                  



              [facility]                                                    



                                                                      



                                                                      

 

       Urinalysis        Problem Active               2021               [facility]

[facility]



       = abnormal                                    22:17:41               l



       (finding)                                                         [place]



              Urinalysis                                                  



              = abnormal                                                  



              (finding)                                                  



                                                                      



                                                                      



              Active                                                  



                                                                      



                                                                      



                                                                      



                                                                      



              Problem                                                  



                                                                      



                                                                      



              2021                                                  



                                                                      



                                                                      



                                                                      



                                                                      



                                                                    



              Medical                                                  



              Group,                                                  



              [facility]                                                  



              [place],                                                  



              [place]                                                  



                                                                      



                                                                      

 

       Atrial        Problem Active               2021               [facility]

ia



       fibrillati                                    22:17:41               l



       on       Atrial                                                  [place]



       (disorder) fibrillati                                                  



              on                                                      



              (disorder)                                                  



                                                                      



                                                                      



               Active                                                  



                                                                      



                                                                      



                                                                      



                                                                      



              Problem                                                  



                                                                      



                                                                      



              2021                                                  



                                                                      



                                                                      



                                                                      



                                                                      



                                                                    



              Medical                                                  



              Group,                                                  



              [facility]                                                  



              [place],                                                  



              [place]                                                  



                                                                      



                                                                      

 

       Benign        Problem Active               2021               [facility]

ia



       essential                                    22:17:41               l



       hypertensi   Benign                                                  [facility]

daryl



       on     essential                                                  



       (disorder) hypertensi                                                  



              on                                                      



              (disorder)                                                  



                                                                      



                                                                      



               Active                                                  



                                                                      



                                                                      



                                                                      



                                                                      



              Problem                                                  



                                                                      



                                                                      



              2021                                                  



                                                                      



                                                                      



                                                                      



                                                                      



                                                                    



              Medical                                                  



              Group,                                                  



              [facility]                                                    



              [facility],                                                  



              [facility]                                                  



              [facility],                                                  



              [facility]                                                     



              [facility]                                                  



              [facility]                                                  



              [place]                                                  



              [facility]                                                  



              [facility],                                                  



              [place]                                                  



                                                                      



                                                                      

 

       Cardiorena        Problem Active               2021               [facility]

[facility]



       l syndrome                                    22:17:41               l



       (disorder)                                                         Donny

n



              Cardiorena                                                  



              l syndrome                                                  



              (disorder)                                                  



                                                                      



                                                                      



               Active                                                  



                                                                      



                                                                      



                                                                      



                                                                      



              Problem                                                  



                                                                      



                                                                      



              2021                                                  



                                                                      



                                                                      



                                                                      



                                                                      



                                                                    



              Medical                                                  



              Group,                                                  



              [facility]                                                  



              [place],                                                  



              [place]                                                  



                                                                      



                                                                      

 

       Chronic        Problem Active               2021               [place]

milan



       kidney                                    22:17:41               l



       disease   Chronic                                                  Donny

n



       (disorder) kidney                                                  



              disease                                                  



              (disorder)                                                  



                                                                      



                                                                      



               Active                                                  



                                                                      



                                                                      



                                                                      



                                                                      



              Problem                                                  



                                                                      



                                                                      



              2021                                                  



                                                                      



                                                                      



                                                                      



                                                                      



                                                                    



              Medical                                                  



              Group,                                                  



              [facility]                                                  



              [facility],                                                  



              [place]                                                  



                                                                      



                                                                      

 

       Chronic        Problem Active               2021               [place]

milan



       kidney                                    22:17:41               l



       disease   Chronic                                                  Donny

n



       stage 3 kidney                                                  



       (disorder) disease                                                  



              stage 3                                                  



              (disorder)                                                  



                                                                      



                                                                      



               Active                                                  



                                                                      



                                                                      



                                                                      



                                                                      



              Problem                                                  



                                                                      



                                                                      



              2021                                                  



                                                                      



                                                                      



                                                                      



                                                                      



                                                                    



              Medical                                                  



              Group,                                                  



              [facility]                                                  



              [place],                                                  



              [place]                                                  



                                                                      



                                                                      

 

       Chronic        Problem Active               2021               [place]

milan



       pain                                      22:17:41               l



       (finding)   Chronic                                                  Herm

daryl



              pain                                                    



              (finding)                                                  



                                                                      



                                                                      



              Active                                                  



                                                                      



                                                                      



                                                                      



                                                                      



              Problem                                                  



                                                                      



                                                                      



              2021                                                  



                                                                      



                                                                      



                                                                      



                                                                      



                                                                    



              Medical                                                  



              Group,                                                  



              [facility]                                                  



              [place],                                                  



              [place]                                                  



                                                                      



                                                                      

 

       Dependence        Problem Active               2021               [facility]

[facility]



       on                                        22:17:41               l



       supplement                                                         Donny

n



       al oxygen Dependence                                                  



       (finding) on                                                      



              supplement                                                  



              al oxygen                                                  



              (finding)                                                  



                                                                      



                                                                      



              Active                                                  



                                                                      



                                                                      



                                                                      



                                                                      



              Problem                                                  



                                                                      



                                                                      



              2021                                                  



                                                                      



                                                                      



                                                                      



                                                                      



                                                                    



              Medical                                                  



              Group,                                                  



              [place]                                                  



                                                                      



                                                                      

 

       Edema of        Problem Active               2021               [facility]

[facility]



       lower                                     22:17:41               l



       extremity   Edema of                                                  Her

[place]



       (finding) lower                                                   



              extremity                                                  



              (finding)                                                  



                                                                      



                                                                      



              Active                                                  



                                                                      



                                                                      



                                                                      



                                                                      



              Problem                                                  



                                                                      



                                                                      



              2021                                                  



                                                                      



                                                                      



                                                                      



                                                                      



                                                                    



              Medical                                                  



              Group,                                                  



              [facility]                                                  



              [place],                                                  



              [place]                                                  



                                                                      



                                                                      

 

       Hyperlipid        Problem Active               2021               [facility]

[facility]



       emia                                      22:17:41               l



       (disorder)                                                         Donny

n



              Hyperlipid                                                  



              emia                                                    



              (disorder)                                                  



                                                                      



                                                                      



               Active                                                  



                                                                      



                                                                      



                                                                      



                                                                      



              Problem                                                  



                                                                      



                                                                      



              2021                                                  



                                                                      



                                                                      



               Data                                                   



              migrated                                                  



              from [facility]                                                  



              on                                                      



              5/30/15.                                                  



                                                                      



                                                                      



               Medical                                                  



              Group,                                                  



              [facility],                                                  



              [facility]                                                  



              Land,[facility]                                                     



              [facility]                                                  



              Trace,[facility]                                                  



              Bone &                                                  



              Joint,                                                  



              [place]                                                  



                                                                      



                                                                      

 

       Hyperparat        Problem Active               2021               M

emoria



       hyroidism                                    22:17:41               l



       (disorder)                                                         Donny

n



              Hyperparat                                                  



              hyroidism                                                  



              (disorder)                                                  



                                                                      



                                                                      



               Active                                                  



                                                                      



                                                                      



                                                                      



                                                                      



              Problem                                                  



                                                                      



                                                                      



              2021                                                  



                                                                      



                                                                      



                                                                      



                                                                      



                                                                    



              Medical                                                  



              Group,                                                  



              [facility]                                                  



              Land,                                                  



              [place]                                                  



                                                                      



                                                                      

 

       Hypertensi        Problem Active               2021               M

emoria



       ve                                        22:17:41               l



       disorder,                                                         Barron



       systemic Hypertensi                                                  



       arterial ve                                                      



       (disorder) disorder,                                                  



              systemic                                                  



              arterial                                                  



              (disorder)                                                  



                                                                      



                                                                      



               Active                                                  



                                                                      



                                                                      



                                                                      



                                                                      



              Problem                                                  



                                                                      



                                                                      



              2021                                                  



                                                                      



                                                                      



                                                                      



                                                                      



                                                                    



              Medical                                                  



              Group,[facility]                                                    



              Special[facility]                                                  



              Hospital                                                  



              [facility]                                                  



              Land,                                                  



              [facility]                                                  



              Land,                                                  



              [place]                                                  



                                                                      



                                                                      

 

       Hypertensi        Problem Active               2021               M

emoria



       ve renal                                    22:17:41               l



       disease                                                         Barron



       (disorder) Hypertensi                                                  



              ve renal                                                  



              disease                                                  



              (disorder)                                                  



                                                                      



                                                                      



               Active                                                  



                                                                      



                                                                      



                                                                      



                                                                      



              Problem                                                  



                                                                      



                                                                      



              2021                                                  



                                                                      



                                                                      



                                                                      



                                                                      



                                                                    



              Medical                                                  



              Group,                                                  



              [facility]                                                  



              Land,                                                  



              [place]                                                  



                                                                      



                                                                      

 

       Hyperurice        Problem Active               2021               M

emoria



       keegan                                       22:17:41               l



       (disorder)                                                         Donny

n



              Hyperurice                                                  



              keegan                                                     



              (disorder)                                                  



                                                                      



                                                                      



               Active                                                  



                                                                      



                                                                      



                                                                      



                                                                      



              Problem                                                  



                                                                      



                                                                      



              2021                                                  



                                                                      



                                                                      



               Data                                                   



              migrated                                                  



              from [facility]                                                  



              on                                                      



              5/30/15.                                                  



                                                                      



                                                                      



               Medical                                                  



              Group,                                                  



              [facility]                                                    



              [facility],                                                  



              [facility]                                                  



              Land,                                                  



              [facility]                                                     



              [facility]                                                  



              [facility]                                                  



              [place]                                                  



              [facility]one &                                                  



              Joint,                                                  



              [place]                                                  



                                                                      



                                                                      

 

       Lymphedema        Problem Active               2021               M

emoria



       of lower                                    22:17:41               l



       extremity                                                         Barron



       (disorder) Lymphedema                                                  



              of lower                                                  



              extremity                                                  



              (disorder)                                                  



                                                                      



                                                                      



               Active                                                  



                                                                      



                                                                      



                                                                      



                                                                      



              Problem                                                  



                                                                      



                                                                      



              2021                                                  



                                                                      



                                                                      



                                                                      



                                                                      



                                                                    



              Medical                                                  



              Group,                                                  



              [facility]                                                  



              Land,                                                  



              [place]                                                  



                                                                      



                                                                      

 

       Malignant        Problem Active               2021               Me

moria



       neoplasm                                    22:17:41               l



       of skin of                                                         Donny

n



       upper limb Malignant                                                  



       (disorder) neoplasm                                                  



              of skin of                                                  



              upper limb                                                  



              (disorder)                                                  



                                                                      



                                                                      



               Active                                                  



                                                                      



                                                                      



                                                                      



                                                                      



              Problem                                                  



                                                                      



                                                                      



              2021                                                  



                                                                      



                                                                      



                                                                      



                                                                      



                                                                    



              Medical                                                  



              Group,                                                  



              [facility]                                                  



              Land,                                                  



              [place]                                                  



                                                                      



                                                                      

 

       Morbid        Problem Active               2021               Memor

ia



       obesity                                    22:17:41               l



       (disorder)   Morbid                                                  Herm

daryl



              obesity                                                  



              (disorder)                                                  



                                                                      



                                                                      



               Active                                                  



                                                                      



                                                                      



                                                                      



                                                                      



              Problem                                                  



                                                                      



                                                                      



              2021                                                  



                                                                      



                                                                      



                                                                      



                                                                      



                                                                    



              Medical                                                  



              Group,                                                  



              [facility]                                                    



              [facility],                                                  



              [facility]                                                  



              Land,                                                  



              [facility]                                                     



              [facility]                                                  



              [facility]                                                  



              [place]                                                  



              Bone &                                                  



              Joint,                                                  



              [place]                                                  



                                                                      



                                                                      

 

       Post-disch        Problem Active               2021               M

emoria



       arge                                      22:17:41               l



       follow-up                                                         Barron



       (finding) Post-disch                                                  



              arge                                                    



              follow-up                                                  



              (finding)                                                  



                                                                      



                                                                      



              Active                                                  



                                                                      



                                                                      



                                                                      



                                                                      



              Problem                                                  



                                                                      



                                                                      



              2021                                                  



                                                                      



                                                                      



                                                                      



                                                                      



                                                                    



              Medical                                                  



              Group,                                                  



              [place]                                                  



                                                                      



                                                                      

 

       Prerenal        Problem Active               2021               Mem

oria



       azotemia                                    22:17:41               l



       (disorder)   Prerenal                                                  He

rmann



              azotemia                                                  



              (disorder)                                                  



                                                                      



                                                                      



               Active                                                  



                                                                      



                                                                      



                                                                      



                                                                      



              Problem                                                  



                                                                      



                                                                      



              2021                                                  



                                                                      



                                                                      



                                                                      



                                                                      



                                                                    



              Medical                                                  



              Group,                                                  



              [facility]                                                  



              Land,                                                  



              [place]                                                  



                                                                      



                                                                      

 

       Proteinuri        Problem Active               2021               M

emoria



       a                                         22:17:41               l



       (finding)                                                         [place]



              Proteinuri                                                  



              a                                                       



              (finding)                                                  



                                                                      



                                                                      



              Active                                                  



                                                                      



                                                                      



                                                                      



                                                                      



              Problem                                                  



                                                                      



                                                                      



              2021                                                  



                                                                      



                                                                      



                                                                      



                                                                      



                                                                    



              Medical                                                  



              Group,                                                  



              [facility]                                                  



              [place],                                                  



              [place]                                                  



                                                                      



                                                                      

 

       Stasis        Problem Active               2021               [facility]

ia



       ulcer                                     22:17:41               l



       (disorder)   Stasis                                                  [facility]

daryl



              ulcer                                                   



              (disorder)                                                  



                                                                      



                                                                      



               Active                                                  



                                                                      



                                                                      



                                                                      



                                                                      



              Problem                                                  



                                                                      



                                                                      



              2021                                                  



                                                                      



                                                                      



                                                                      



                                                                      



                                                                    



              Medical                                                  



              Group,                                                  



              [facility]                                                  



              [place],                                                  



              [place]                                                  



                                                                      



                                                                      

 

       Swelling -        Problem Active               2021               [facility]

[facility]



       edema -                                    22:17:41               l



       symptom   Swelling                                                  [place]

nn



       (finding) - edema -                                                  



              symptom                                                  



              (finding)                                                  



                                                                      



                                                                      



              Active                                                  



                                                                      



                                                                      



                                                                      



                                                                      



              Problem                                                  



                                                                      



                                                                      



              2021                                                  



                                                                      



                                                                      



                                                                      



                                                                      



                                                                    



              Medical                                                  



              Group,                                                  



              [facility]                                                  



              [place],                                                  



              [place]                                                  



                                                                      



                                                                      

 

       Swollen        Problem Active               2021               [place]

[place]



       ankle                                     22:17:41               l



       (finding)   Swollen                                                  Herm

daryl



              ankle                                                   



              (finding)                                                  



                                                                      



                                                                      



              Active                                                  



                                                                      



                                                                      



                                                                      



                                                                      



              Problem                                                  



                                                                      



                                                                      



              2021                                                  



                                                                      



                                                                      



                                                                      



                                                                      



                                                                    



              Medical                                                  



              Group,                                                  



              [facility]                                                  



              [place],                                                  



              [place]                                                  



                                                                      



                                                                      

 

       Urinary        Problem Resolve               2021               [facility]

[facility]



       tract                d                    22:17:41               l



       infectious   Urinary                                                  Her

[place]



       disease tract                                                   



       (disorder) infectious                                                  



              disease                                                  



              (disorder)                                                  



                                                                      



                                                                      



               Resolved                                                  



                                                                      



                                                                      



                                                                      



                                                                      



                Problem                                                  



                                                                      



                                                                      



              2021                                                  



                                                                      



                                                                      



                                                                      



                                                                      



                                                                    



              Medical                                                  



              Group,                                                  



              [facility]                                                  



              [place],                                                  



              [place]                                                  



                                                                      



                                                                      

 

       Venous        Problem Active               2021               [facility]

ia



       ulcer of                                    22:17:41               l



       leg      Venous                                                  [place]



       (disorder) ulcer of                                                  



              leg                                                     



              (disorder)                                                  



                                                                      



                                                                      



               Active                                                  



                                                                      



                                                                      



                                                                      



                                                                      



              Problem                                                  



                                                                      



                                                                      



              2021                                                  



                                                                      



                                                                      



                                                                      



                                                                      



                                                                    



              Medical                                                  



              Group,                                                  



              [facility]                                                  



              [place],                                                  



              [place]                                                  



                                                                      



                                                                      

 

       Weight        Problem Active               2021               [facility]

ia



       gain                                      22:17:41               l



       finding   Weight                                                  [place]



       (finding) gain                                                    



              finding                                                  



              (finding)                                                  



                                                                      



                                                                      



              Active                                                  



                                                                      



                                                                      



                                                                      



                                                                      



              Problem                                                  



                                                                      



                                                                      



              2021                                                  



                                                                      



                                                                      



                                                                      



                                                                      



                                                                    



              Medical                                                  



              Group,                                                  



              [facility]                                                  



              [place],                                                  



              [place]                                                  



                                                                      



                                                                      

 

       Acute         Problem Active               2016               [facility]

[facility]



       renal                                     00:23:22               l



       failure   Acute                                                  [place]



       syndrome renal                                                   



       (disorder) failure                                                  



              syndrome                                                  



              (disorder)                                                  



                                                                      



                                                                      



               Active                                                  



                                                                      



                                                                      



                                                                      



                                                                      



              Problem                                                  



                                                                      



                                                                      



              2016                                                  



                                                                      



                                                                      



               Data                                                   



              migrated                                                  



              from [facility]                                                  



              on                                                      



              5/30/15.                                                  



                                                                      



                                                                      



               [facility]                                                  



              [facility],                                                  



              [facility]                                                  



              [place],[facility]                                                     



              [facility]                                                  



              [facility]                                                  



              [place]                                                  



              [facility]                                                  



              [facility]                                                   



                                                                      



                                                                      

 

       Kidney        Problem Active               2017               [facility]

ia



       disease                                    03:07:28               l



       (disorder)   Kidney                                                  Herm

daryl



              disease                                                  



              (disorder)                                                  



                                                                      



                                                                      



               Active                                                  



                                                                      



                                                                      



                                                                      



                                                                      



              Problem                                                  



                                                                      



                                                                      



              2017                                                  



                                                                      



                                                                      



                                                                      



                                                                      



                                                                    



              [place]                                                  



                                                                      



                                                                      

 

       Migraine        Problem Active               2017               [facility]

[facility]



       (disorder)                                    03:07:28               l



                Migraine                                                  Donny

n



              (disorder)                                                  



                                                                      



                                                                      



               Active                                                  



                                                                      



                                                                      



                                                                      



                                                                      



              Problem                                                  



                                                                      



                                                                      



              2017                                                  



                                                                      



                                                                      



                                                                      



                                                                      



                                                                      



              [facility]                                                  



              [facility]                                                  



              [facility]                                                  



              [facility] [place],                                                  



              [place]                                                  



                                                                      



                                                                      

 

       RIGHT         Diagnosis Active               2016               [facility]

[facility]



       WRIST                                     15:06:00               l



       DISTAL   RIGHT                                                  [place]



       RADIUS WRIST                                                   



       CLSD FX DISTAL                                                  



              RADIUS                                                  



              CLSD FX                                                  



                                                                      



                                                                      



              Active                                                  



                                                                      



                                                                      



                                                                      



                                                                      



                                                                      



                                                                      



                                                                      



                                                                      



                                                                      



                                                                      



                   [facility]                                                  



              [place]                                                  



              [facility]                                                  



              [facility]                                                   



                                                                      



                                                                      

 

       DISTAL        Diagnosis Active               2016               [facility]

[facility]



       RADIUS                                    09:46:00               l



       CLSD FX   DISTAL                                                  [place]



              RADIUS                                                  



              CLSD FX                                                  



                                                                      



                                                                      



              Active                                                  



                                                                      



                                                                      



                                                                      



                                                                      



                                                                      



                                                                      



                                                                      



                                                                      



                                                                      



                                                                      



                   [facility]                                                     



              [place]                                                  



              [facility]                                                   



                                                                      



                                                                      

 

       Cholestero        Problem                      2016               [facility]

[facility]



       l                                         05:02:18               l



       (substance                                                         Donny

n



       )      Cholestero                                                  



              l                                                       



              (substance                                                  



              )                                                       



                                                                      



                                                                      



                                                                      



                                                                      



                                                                      



                                                                      



              Problem                                                  



                                                                      



                                                                      



              2016                                                  



                                                                      



                                                                      



                                                                      



                                                                      



                                                                      



              Surgical                                                  



              Specialty                                                  



              [facility]                                                    



                                                                      



                                                                      

 

       Sleep         Problem                      2016               [facility]

ia



       apnea                                     05:02:18               l



       (finding)   Sleep                                                  Donny

n



              apnea                                                   



              (finding)                                                  



                                                                      



                                                                      



                                                                      



                                                                      



                                                                      



                                                                      



                                                                      



              Problem                                                  



                                                                      



                                                                      



              2016                                                  



                                                                      



                                                                      



               2does not                                                  



              use cpap                                                  



                                                                      



                                                                      



              Surgical                                                  



              [facility]                                                    



                                                                      



                                                                      

 

       Acute         Problem Resolve 2016               

[facility]



       otitis               d         00:23:22 00:23:22               l



       media    Acute               00:00:                             [facility]



       (disorder) otitis               00                                 



              media                                                   



              (disorder)                                                  



                                                                      



                                                                      



               Resolved                                                  



                                                                      



                                                                      



              2013                                                  



                                                                      



                                                                      



               Problem                                                  



                                                                      



                                                                      



              2016                                                  



                                                                      



                                                                      



               Data                                                   



              migrated                                                  



              from [facility]                                                  



              on                                                      



              7/18/15.                                                  



                                                                      



                                                                      



               OPID                                                  



              Tessa,                                                  



              OPID Sugar                                                  



              [place],[facility]                                                     



              [place]                                                  



              [place],[facility]                                                  



              Bone &                                                  



              Joint                                                   



                                                                      



                                                                      







History of Past Illness







       Condition Condition Condition Status Onset  Resolution Last   Treating Co

mments 

Source



       Name   Details Category        Date   Date   Treatment Clinician        



                                                 Date                 

 

       Dependence        Problem        2021            

   [facility]



       on                             01:29:16 01:29:16               l



       supplement                      21:17:                             Donny

n



       al oxygen Dependence               00                                 



              on                                                      



              supplement                                                  



              al oxygen                                                  



                                                                      



                                                                      



                                                                      



                                                                      



                                                                      



              2021                                                  



                                                                      



                                                                      



                                                                      



                                                                      



                                                                    



              Medical                                                  



              Group                                                   



                                                                      



                                                                      

 

       Other         Problem        2021               M

[facility]



       hyperlipid                         01:29:16 01:29:16               l



       emia     Other               21:16:                             [facility]



              hyperlipid               00                                 



              emia                                                    



                                                                      



                                                                      



                                                                      



                                                                      



              2021                                                  



                                                                      



                                                                      



                                                                      



                                                                      



                                                                    



              Medical                                                  



              Group                                                   



                                                                      



                                                                      

 

       Unsteadine        Problem        2021            

   [facility]



       ss on feet                         01:29:16 01:29:16               l



                                   21:13:                             [facility]



              Unsteadine               00                                 



              ss on feet                                                  



                                                                      



                                                                      



                                                                      



                                                                      



                                                                      



              2021                                                  



                                                                      



                                                                      



                                                                      



                                                                      



                                                                    



              Medical                                                  



              Group                                                   



                                                                      



                                                                      

 

       Weakness        Problem        2021              

 [facility]



                                      01:29:16 01:29:16               l



                Weakness               21:13:                             Donny

n



                                   00                                 



                                                                      



                                                                      



                                                                      



                                                                      



              2021                                                  



                                                                      



                                                                      



                                                                      



                                                                      



                                                                    



              Medical                                                  



              Group                                                   



                                                                      



                                                                      

 

       Essential        Problem        2021             

  [facility]



       (primary)                         01:29:16 01:29:16               l



       hypertensi                      21:12:                             Donny martinez



       on     Essential               00                                 



              (primary)                                                  



              hypertensi                                                  



              on                                                      



                                                                      



                                                                      



                                                                      



                                                                      



              2021                                                  



                                                                      



                                                                      



                                                                      



                                                                      



                                                                    



              Medical                                                  



              Group                                                   



                                                                      



                                                                      

 

       Other long        Problem        2021            

   [facility]



       term                           22:39:43 22:39:43               l



       (current)   Other               22:29:                             Donny

n



       drug   long term               00                                 



       therapy (current)                                                  



              drug                                                    



              therapy                                                  



                                                                      



                                                                      



                                                                      



                                                                      



              2021                                                  



                                                                      



                                                                      



                                                                      



                                                                      



                                                                    



              Medical                                                  



              Group                                                   



                                                                      



                                                                      

 

       Other         Problem        2021               M

[facility]



       abnormal                         22:39:43 22:39:43               l



       glucose   Other               22:23:                             Barron



              abnormal               00                                 



              glucose                                                  



                                                                      



                                                                      



                                                                      



                                                                      



              2021                                                  



                                                                      



                                                                      



                                                                      



                                                                      



                 [facility]                                                  



              Group                                                   



                                                                      



                                                                      







Allergies, Adverse Reactions, Alerts







       Allergy Allergy Status Severity Reaction(s) Onset  Inactive Treating Comm

ents 

Source



       Name   Type                        Date   Date   Clinician        

 

       penicill penicill Active                                           [facility]

a



       ins<sup> ins<sup>                                                  l



       1</sup> 1</sup>                                                  Barron

 

       codeine< codeine< Active                                           [facility]

a



       sup>1</s sup>1</s                                                  l



       up>    up>                                                     Barron

 

       cefepime cefepime Active                                           [facility]

a



                                                                      l



                                                                      [facility]

 

       Levaquin Levaquin Active                                           [facility]

a



                                                                      l



                                                                      Bar[facility]

 

       penicill penicill Active                                           [facility]

a



       ins<sup> ins<sup>                                                  l



       2</sup> 2</sup>                                                  [facility]

 

       codeine codeine Active                                           [facility]



                                                                      l



                                                                      [facility]

 

       penicill penicill Active                                           [facility]

a



       ins    ins                                                     l



                                                                      Barron







Social History







           Social Habit Start Date Stop Date  Quantity   Comments   Source

 

           Social History 2019-10-25 2019-10-25                       Doreen nguyen



                      14:54:48   14:54:48                         









                Smoking Status  Start Date      Stop Date       Source

 

                Social History                                  Doreen Mart







Medications







       Ordered Filled Start  Stop   Current Ordering Indication Dosage Frequency

 Signature

                    Comments            Components          Source



     Medication Medication Date Date Medication? Clinician                (SIG) 

          



     Name Name                                                   

 

     Mupirocin      2021      Yes                      1 appl,           [facility]

ia



     0.02 MG/MG      -                               TOP, TID,           l



     Topical      23:21:                               X 5 day, #           [facility]

daryl



     Ointment      00                                 22 gm, 0           



                                                  Refill(s),           



                                                  Pharmacy:           



                                                  [facility]           



                                                  DRUG STORE           



                                                  #[id number],           



                                                  165.1, cm,           



                                                  21           



                                                  14:08:00           



                                                  CST,           



                                                  Height,           



                                                  136.42,           



                                                  kg,            



                                                  21           



                                                  14:08:00           



                                                  CST,           



                                                  Weight           

 

     bumetanide      2021      Yes                      2 mg = 1           Mem

oria



     2 mg oral      1-19                               tab, PO,           l



     tablet      21:11:                               Daily, #           [facility]



               00                                 30 tab, 0           



                                                  Refill(s)           

 

     allopurinol      2021      Yes                      100 mg = 1           

[facility]



     100 mg oral      1-19                               tab, PO,           l



     tablet      21:10:                               BID, # 60           Donny

n



               00                                 tab, 0           



                                                  Refill(s)           

 

     gabapentin      2021      Yes                      200 mg = 2           M

[facility]



     100 MG Oral      1-19                               cap, PO,           l



     Capsule      21:10:                               Bedtime, 0           [facility]

daryl



               00                                 Refill(s)           

 

     QUEtiapine            Yes                      1 tablet,           Me

moria



     50 mg oral      3-30                               once a           l



     tablet      13:55:                               day, 0           [facility]



               00                                 Refill(s)           

 

     torsemide            Yes                      1 tablet,           Mem

oria



     20 mg oral      3-30                               twice a           l



     tablet      13:55:                               day, 0           [facility]



               00                                 Refill(s)           

 

     potassium            Yes                      1 tablet,           Mem

oria



     chloride 20      3-30                               once a           l



     mEq oral      13:54:                               day, 0           [facility]



     tablet,      00                                 Refill(s)           



     extended                                                        



     release                                                        



     (KCL)                                                        

 

     allopurinol            Yes                      1/2            [facility]

a



     300 mg oral      3-30                               tablet,           l



     tablet      13:53:                               once a           [facility]



               00                                 day, 0           



                                                  Refill(s)           

 

     carvedilol            Yes                      1 tablet,           Me

moria



     3.125 mg      3-30                               twice a           l



     oral tablet      13:53:                               day, 0           [facility]

daryl



               00                                 Refill(s)           

 

     Digoxin            Yes                      1 tablet,           [facility]

ia



     0.125 MG      3-30                               once a           l



     Oral Tablet      13:53:                               day, 0           [facility]

daryl



               00                                 Refill(s)           

 

     DULoxetine            Yes                      1 capsule,           [facility]

[facility]



     60 mg oral      3-30                               once a           l



     delayed      13:53:                               day, 0           [facility]



     release      00                                 Refill(s)           



     capsule                                                        

 

     apixaban 5            Yes                      1 tablet,           Me

[facility]



     MG Oral      3-30                               twice a           l



     Tablet      13:53:                               day, 0           [facility]



     [Eliquis]      00                                 Refill(s)           

 

     gabapentin            Yes                      1 capsule,           M

[facility]



     300 MG Oral      3-30                               twice a           l



     Capsule      13:53:                               day, 0           [facility]



               00                                 Refill(s)           

 

     metoprolol            Yes                      1 tablet,           Me

[facility]



     tartrate 50      3-30                               Twice a           l



     mg oral      13:53:                               day, 0           [facility]



     tablet      00                                 Refill(s)           

 

     midodrine 5            Yes                      1 tablet,           M

[facility]



     mg oral      3-30                               twice a           l



     tablet      13:53:                               day, 0           [facility]



               00                                 Refill(s)           

 

     DULoxetine      2020      Yes                      60 mg = 1           Me

[facility]



     60 mg oral      1-25                               cap, PO,           l



     delayed      17:17:                               Daily, #           [facility]

n



     release      00                                 30 cap, 0           



     capsule                                         Refill(s)           

 

     Spironolact      2020      Yes                      25 mg = 1           M

[facility]



     one 25 MG      1-25                               tab, PO,           l



     Oral Tablet      17:17:                               Daily, 0           He

rmann



     [Aldactone]      00                                 Refill(s)           

 

     Digoxin      2020      Yes                      0.125 mg,           [facility]

ia



     0.125 MG      1-25                               PO, Daily,           l



     Oral Tablet      17:13:                               # 30 tab,           [facility]

[facility]



               00                                 0              



                                                  Refill(s)           

 

     Mupirocin      2020      Yes                      See            [facility]



     0.02 MG/MG      0-13                               Instruct[facility]           l



     Topical      15:44:                               ns, APPLY           [facility]

nn



     Ointment      00                                 EXTERNALLY           



                                                  TO THE           



                                                  AFFECTED           



                                                  AREA TWICE           



                                                  DAILY, #           



                                                  66 gm, 1           



                                                  Refill(s),           



                                                  Pharmacy:           



                                                  [facility]           



                                                  [facility]           



                                                  #[id number],           



                                                  170.18,           



                                                  cm,            



                                                  20           



                                                  14:42:00           



                                                  CST,           



                                                  Height,           



                                                  164.318,           



                                                  kg,            



                                                  20           



                                                  14:42:00           



                                                  CST,           



                                                  Weight           

 

     Nitrofurant            Yes                      100 mg = 1           

[facility]



     oin 100 MG      8-09                               cap, PO,           l



     Oral      17:57:                               BID, X 10           [facility]



     Capsule      00                                 day, # 20           



     [Macrobid]                                         cap, 0           



                                                  Refill(s),           



                                                  Pharmacy:           



                                                  [facility]           



                                                  #[id number],           



                                                  170.18,           



                                                  cm,            



                                                  20           



                                                  14:42:00           



                                                  CST,           



                                                  Height,           



                                                  164.318,           



                                                  kg,            



                                                  20           



                                                  14:42:00           



                                                  CST,           



                                                  Weight           

 

     lisinopril      -0      Yes                      2.5 mg = 1           M

[facility]



     2.5 mg oral      6-04                               tab, PO,           l



     tablet      23:26:                               Daily, #           [facility]



               00                                 30 tab, 2           



                                                  Refill(s),           



                                                  called to           



                                                  pharmacy           

 

     calcitriol      -0      Yes                      = 1 cap,           [facility]

[facility]



     0.25 mcg      5-20                               PO, Daily,           l



     oral      12:18:                               # 90 cap,           [facility]



     capsule      00                                 1              



                                                  Refill(s),           



                                                  Pharmacy[place]           



                                                  [facility] [place]           



                                                  [id number]           

 

     calcitriol      -0      Yes                      = 1 cap,           [facility]

[facility]



     0.25 mcg      4-16                               PO, Daily,           l



     oral      16:37:                               # 90           [facility]



     capsule      47                                 unknown           



                                                  unit,           



                                                  Pharmacy:           



                                                  [facility]           



                                                  #[id number]           

 

     Furosemide      -0      Yes                      = 1 tab,           [facility]

[facility]



     40 MG Oral      4-13                               PO, Every           l



     Tablet      20:06:                               Other Day,           [facility]

nn



               19                                 # 45 tab,           



                                                  Pharmacy:           



                                                  [facility] [place]           



                                                  #[id number]           

 

     prednisolon      -0      Yes                      1 drop in           M

[facility]



     e acetate      4-10                               each eye,           l



     10 MG/ML      20:53:                               once           [facility]



     Ophthalmic      00                                 daily, 0           



     Suspension                                         Refill(s)           

 

     bromfenac      -0      Yes                      1 drop in           [facility]

[facility]



     0.7 MG/ML      4-10                               right eye,           l



     Ophthalmic      20:53:                               once           [facility]



     Solution      00                                 daily, 0           



     [Prolensa]                                         Refill(s)           

 

     Furosemide      -0      Yes                      = 1 tab,           [facility]

[facility]



     40 MG Oral      1-23                               PO, Every           l



     Tablet      15:13:                               Other Day,           [facility]

nn



               34                                 # 45 tab,           



                                                  Pharmacy:           



                                                  [facility]           



                                                  #[id number]           

 

     Mupirocin      2019      Yes                      See            [facility]



     0.02 MG/MG      2-27                               Instructio           l



     Topical      19:11:                               ns, # 66           [facility]

n



     Ointment      15                                 gm, APPLY           



                                                  EXTERNALLY           



                                                  TO THE           



                                                  AFFECTED           



                                                  AREA TWICE           



                                                  DAILY,           



                                                  Pharmacy:           



                                                  [facility]           



                                                  #[id number]           

 

     Nitrofurant      2019      Yes                      100 mg = 1           

[facility]



     oin 100 MG      2-13                               cap, PO,           l



     Oral      01:44:                               BID, X 5           [facility]



     Capsule      00                                 day, # 10           



     [Macrodanti                                         cap, 0           



     n]                                           Refill(s),           



                                                  Pharmacy:           



                                                  [facility]           



                                                  #[id number]           

 

     apixaban 5      2019      Yes                      5 mg, PO,           Me

[facility]



     MG Oral      0-25                               Q12H, 0           l



     Tablet      15:07:                               Refill(s)           [facility]

n



     [Eliquis]      00                                                

 

     metoprolol      2019      Yes                      50 mg = 1           Me

[facility]



     tartrate 50      0-25                               tab, PO,           l



     mg oral      15:07:                               BID, # 180           [facility]

d[facility]



     tablet      00                                 tab, 0           



                                                  Refill(s)           

 

     Diltiazem      2019      Yes                      180 mg = 1           Me

[facility]



     Hydrochlori      0-25                               cap, PO,           l



     de       15:07:                               Daily, #           [facility]

daryl



     mg/24 hours      00                                 30 cap, 0           



     oral                                         Refill(s)           



     capsule,                                                        



     extended                                                        



     release                                                        

 

     tramadol      2019      Yes                      150 mg = 1           [facility]

[facility]



     150 mg/24      0-25                               cap, PO,           l



     hours oral      15:07:                               Daily, 0           Her

hernandes



     capsule,      00                                 Refill(s)           



     extended                                                        



     release                                                        

 

     Acetaminoph      2019      Yes                      1 tab, PO,           

[facility]



     en 325 MG /      0-25                               Q6H, 0           l



     Hydrocodone      15:07:                               Refill(s)           H

[facility]



     Bitartrate      00                                                



     5 MG Oral                                                        



     Tablet                                                        



     [Norco                                                        



     5/325]                                                        

 

     calcitriol      2019      Yes                      = 1 cap,           [facility]

[facility]



     0.25 mcg      0-11                               PO, Daily,           l



     oral      21:10:                               # 90           [facility]



     capsule      30                                 unknown           



                                                  unit,           



                                                  Pharmacy:           



                                                  [facility]           



                                                  [facility]           



                                                  #[id number]           

 

     gabapentin            Yes                      300 mg = 1           M

[facility]



     300 MG Oral      9-27                               cap, PO,           l



     Capsule      20:54:                               BID, # 180           [facility]

daryl



               00                                 cap, 3           



                                                  Refill(s),           



                                                  called to           



                                                  pharmacy           

 

     allopurinol            Yes                      = 1 tab,           Me

[facility]



     300 mg oral      8-17                               PO, Daily,           l



     tablet      02:39:                               # 90 tab,           [facility]

n



               31                                 Pharmacy:           



                                                  [facility]           



                                                  #[id number]           

 

     QUEtiapine            Yes                      50 mg = 1           Me

[facility]



     50 mg oral      6-06                               tab, PO,           l



     tablet      15:28:                               Bedtime, #           [facility]

nn



               00                                 30 tab, 1           



                                                  Refill(s)           

 

     eletriptan            Yes                      40 mg = 1           Me

[facility]



     40 mg oral      6-06                               tab, PO,           l



     tablet      15:26:                               Daily, PRN           [facility]

nn



               00                                 for            



                                                  migraine           



                                                  headache,           



                                                  may repeat           



                                                  dose once           



                                                  in 2           



                                                  hours, # 6           



                                                  tab, 0           



                                                  Refill(s)           

 

     atorvastati            Yes                      See            [facility]

a



     n 40 mg      3-14                               Instructio           l



     oral tablet      15:07:                               ns, TAKE 1           

[facility]



               35                                 TABLET BY           



                                                  MOUTH           



                                                  EVERY           



                                                  NIGHT AT           



                                                  BEDTIME, #           



                                                  90 tab, 1           



                                                  Refill(s),           



                                                  Pharmacy:           



                                                  [facility]           



                                                  [facility]           



                                                  [id number]           

 

     amLODIPine            Yes                      See            [facility]



     5 mg oral      3-14                               Instructio           l



     tablet      15:07:                               ns, TAKE 1           [facility]

nn



               33                                 TABLET BY           



                                                  MOUTH           



                                                  EVERY DAY,           



                                                  # 90 tab,           



                                                  1              



                                                  Refill(s),           



                                                  Pharmacy:           



                                                  [facility]           



                                                  [facility]           



                                                  [id number]           

 

     lisinopril            Yes                      See            [facility]



     5 mg oral      8-21                               Instructio           l



     tablet      19:00:                               ns, TAKE 1           [facility]

nn



               30                                 TABLET BY           



                                                  MOUTH           



                                                  DAILY, #           



                                                  90 tab, 1           



                                                  Refill(s),           



                                                  Pharmacy:           



                                                  [facility]           



                                                  [facility]           



                                                  [id number]           

 

     atorvastati            Yes                      See            [facility]

a



     n 40 mg      8-21                               Instructio           l



     oral tablet      18:50:                               ns, TAKE 1           

[facility]



               45                                 TABLET BY           



                                                  MOUTH           



                                                  EVERY           



                                                  NIGHT AT           



                                                  BEDTIME, #           



                                                  30 tab, 5           



                                                  Refill(s),           



                                                  Pharmacy:           



                                                  [facility]           



                                                  [facility]           



                                                  [id number]           

 

     amLODIPine            Yes                      See            [facility]



     5 mg oral      8-21                               Instructio           l



     tablet      18:50:                               ns, TAKE 1           [facility]

nn



               41                                 TABLET BY           



                                                  MOUTH           



                                                  EVERY DAY,           



                                                  # 30 tab,           



                                                  5              



                                                  Refill(s),           



                                                  Pharmacy:           



                                                  [facility]           



                                                  [facility]           



                                                  [id number]           

 

     lisinopril            No                       See            [facility]



     5 mg oral      7-31                               Instructio           l



     tablet      15:28:                               ns, # 90           [facility]



               34                                 tab, TAKE           



                                                  1 TABLET           



                                                  BY MOUTH           



                                                  DAILY,           



                                                  Pharmacy:           



                                                  [facility]           



                                                  [facility]           



                                                  [id number]           

 

     allopurinol            No                       300 mg = 1           

[facility]



     300 mg oral      5-10                               tab, PO,           l



     tablet      13:59:                               Daily, #           [facility]



               00                                 90 tab, 1           



                                                  Refill(s),           



                                                  Pharmacy:           



                                                  [facility]           



                                                  [facility]           



                                                  [id number]           

 

     lisinopril            No                       See            [facility]



     5 mg oral      5-01                               Instructio           l



     tablet      12:38:                               ns, # 90           [facility]



               18                                 tab, TAKE           



                                                  1 TABLET           



                                                  BY MOUTH           



                                                  DAILY,           



                                                  Pharmacy:           



                                                  [facility]           



                                                  [facility]           



                                                  [id number]           

 

     ALLOPURINOL            Yes                      See            [facility]

a



     300MG      5-01                               Instructio           l



     TABLETS      12:38:                               ns, # 90           Donny

n



               18                                 tab, TAKE           



                                                  1 TABLET           



                                                  BY MOUTH           



                                                  DAILY,           



                                                  Pharmacy:           



                                                  [facility]           



                                                  [facility]           



                                                  [facility]           

 

     calcitriol            Yes                      0.25           [facility]



     0.25 mcg      3-28                               microgram           l



     oral      13:49:                               = 1 cap,           [facility]



     capsule      00                                 PO, Daily,           



                                                  # 90 cap,           



                                                  3              



                                                  Refill(s),           



                                                  Pharmacy:           



                                                  [facility]           



                                                  [facility]           



                                                  [facility]           

 

     Mupirocin            Yes                      1 appl,           [facility]

ia



     0.02 MG/MG      3-21                               TOP, BID,           l



     Topical      15:37:                               30 grams           Donny

n



     Ointment      21                                 3each, # 3           



                                                  ea, 1           



                                                  Refill(s),           



                                                  Pharmacy:           



                                                  [facility]           



                                                  [facility]           



                                                  [facility]           

 

     atorvastati            Yes                      See            [facility]

a



     n 40 mg      3-21                               Instructio           l



     oral tablet      15:36:                               ns, TAKE 1           

[facility]



               22                                 TABLET BY           



                                                  MOUTH           



                                                  EVERY           



                                                  NIGHT AT           



                                                  BEDTIME, #           



                                                  30 tab, 5           



                                                  Refill(s),           



                                                  Pharmacy:           



                                                  [facility]           



                                                  [facility]           



                                                  [facility]           

 

     amLODIPine            Yes                      See            [facility]



     5 mg oral      3-21                               Instructio           l



     tablet      15:36:                               ns, TAKE 1           [facility]

nn



               18                                 TABLET BY           



                                                  MOUTH           



                                                  EVERY DAY,           



                                                  # 30 tab,           



                                                  5              



                                                  Refill(s),           



                                                  Pharmacy:           



                                                  [facility]           



                                                  [facility]           



                                                  [facility]           

 

     aspirin 81            Yes                      81 mg = 1           Me

moria



     mg tablet,      1-24                               tab, PO,           l



     enteric      16:34:                               Daily, #           Donny

n



     coated      00                                 90 tab, 3           



                                                  Refill(s)           

 

     Xopenex            No   Ghassan K                0.63 mg =           Mem

oria



     0.63 mg/3      3-11           Chun                3 mL,           l



     mL        01:00:                               DAYNA Browning,           [facility]



     inhalation      00                                 Once,           



     solution                                         first dose           



                                                  03/10/16           



                                                  19:00:00           



                                                  CST, stop           



                                                  date           



                                                  03/10/16           



                                                  19:00:00           



                                                  CST            

 

     Misc            No   Deuce                300 mL,           [facility]



     Medication      3-11           [facility]                Soln-IV,           l



               00:03:                               IV, Once,           [facility]



               00                                 first dose           



                                                  03/10/16           



                                                  18:03:00           



                                                  CST, stop           



                                                  date           



                                                  03/10/16           



                                                  18:03:00           



                                                  CST            

 

     promethazin            No   Ghassan K                12.5 mg =          

 [facility]



     e         3-11           Chun                0.5 mL,           l



               00:02:                               Injection,           [facility]



               00                                 IM, Once           



                                                  PRN for           



                                                  severe           



                                                  nausea,           



                                                  first dose           



                                                  03/10/16           



                                                  18:02:00           



                                                  CST            

 

     albuterol            No   Ghassan K                2.5 mg = 3           

[facility]



     2.5 mg/3 mL      3-11           Chun                mL, Soln,           

l



     (0.083%)      00:02:                               NEB, Once           [facility]

daryl



     inhalation      00                                 PRN for           



     solution                                         wheezing,           



                                                  first dose           



                                                  03/10/16           



                                                  18:02:00           



                                                  CST            

 

     Demerol HCl            No   Ghassan K                12.5 mg =          

 [facility]



               3-11           Chun                0.25 mL,           l



               00:02:                               Injection,           [facility]



               00                                 IV Push,           



                                                  Once PRN           



                                                  for            



                                                  shivers,           



                                                  first dose           



                                                  03/10/16           



                                                  18:02:00           



                                                  CST            

 

     ondansetron            No   Ghassan K                4 mg = 2           

[facility]



               3-11           Chun                mL,            l



               00:02:                               Injection,           [facility]



               00                                 IV Push,           



                                                  q15min PRN           



                                                  for            



                                                  nausea/vom           



                                                  iting,           



                                                  order           



                                                  duration:           



                                                  2 doses,           



                                                  first dose           



                                                  03/10/16           



                                                  18:02:00           



                                                  CST, stop           



                                                  date           



                                                  Limited #           



                                                  of times           

 

     Dilaudid            No   Ghassan K                0.5 mg =           Mem

oria



               3-11           Chun                0.25 mL,           l



               00:02:                               Injection,           [facility]



               00                                 IV Push,           



                                                  q10min PRN           



                                                  for pain           



                                                  severe           



                                                  (7-10),           



                                                  first dose           



                                                  03/10/16           



                                                  18:02:00           



                                                  CST            

 

     diphenhydrA            No   Ghassan K                25 mg =           M

[facility]



     MINE      3-11           Chun                0.5 mL,           l



               00:02:                               Injection,           [facility]



               00                                 IV Push,           



                                                  Once PRN           



                                                  for            



                                                  itching,           



                                                  first dose           



                                                  03/10/16           



                                                  18:02:00           



                                                  CST            

 

     LR 1,000 mL            No   Ghassan K                1,000 mL,          

 [facility]



               3-11           Chun                IV, 75           l



               00:02:                               mL/hr,           [facility]



               00                                 start date           



                                                  03/10/16           



                                                  18:02:00           



                                                  CST            

 

     Saline Lock            No   Ghassan K                10 mL,           Me

[facility]ia



     Flush      3-11           Chun                Soln, IV           l



               00:02:                               Push, As           [facility]



               00                                 Indicated           



                                                  PRN for           



                                                  flush,           



                                                  first dose           



                                                  03/10/16           



                                                  18:02:00           



                                                  CST            

 

     Bupivacaine            No   Ghassan K                300 mL,           M

[facility]



     0.25% 300      3-11           Chun                Nerve           l



     mL pump 300      00:02:                               Block, 5           He

rmann



     mL        00                                 mL/hr,           



                                                  start date           



                                                  03/10/16           



                                                  18:02:00           



                                                  CST            

 

     Misc      0      No   Deuce                1,000 mL,           [facility]

a



     Medication      3-10           [facility]                Soln-IV,           l



               23:42:                               IV, Once,           [facility]



               00                                 first dose           



                                                  03/10/16           



                                                  17:42:00           



                                                  CST, stop           



                                                  date           



                                                  03/10/16           



                                                  17:42:00           



                                                  CST            

 

     fentaNYL            No   Deuce                25 mcg =           Mem

oria



               3-10           [facility]                0.5 mL,           l



               23:20:                               Injection,           [facility]



               00                                 IV, Once,           



                                                  first dose           



                                                  03/10/16           



                                                  17:20:00           



                                                  CST, stop           



                                                  date           



                                                  03/10/16           



                                                  17:20:00           



                                                  CST            

 

     ondansetron            No   Deuce                4 mg = 2           

[facility]



               3-10           [facility]                mL,            l



               23:03:                               Injection,           [facility]



               00                                 IV, Once,           



                                                  first dose           



                                                  03/10/16           



                                                  17:03:00           



                                                  CST, stop           



                                                  date           



                                                  03/10/16           



                                                  17:03:00           



                                                  CST            

 

     fentaNYL            No   Deuce                25 mcg =           Mem

oria



               3-10           [facility]                0.5 mL,           l



               23:00:                               Injection,           [facility]



               00                                 IV, Once,           



                                                  first dose           



                                                  03/10/16           



                                                  17:00:00           



                                                  CST, stop           



                                                  date           



                                                  03/10/16           



                                                  17:00:00           



                                                  CST            

 

     fentaNYL            No   Deuce                25 mcg =           Mem

oria



               3-10           [facility]                0.5 mL,           l



               22:48:                               Injection,           [facility]



               00                                 IV, Once,           



                                                  first dose           



                                                  03/10/16           



                                                  16:48:00           



                                                  CST, stop           



                                                  date           



                                                  03/10/16           



                                                  16:48:00           



                                                  CST            

 

     fentaNYL            No   Deuce                25 mcg =           Mem

oria



               3-10           [facility]                0.5 mL,           l



               22:37:                               Injection,           [facility]



               00                                 IV, Once,           



                                                  first dose           



                                                  03/10/16           



                                                  16:37:00           



                                                  CST, stop           



                                                  date           



                                                  03/10/16           



                                                  16:37:00           



                                                  CST            

 

     dexamethaso      0      No   Deuce                8 mg = 2           

[facility]



     ne        3-10           [facility]                mL,            l



               22:23:                               Injection,           [facility]



               00                                 IV, Once,           



                                                  first dose           



                                                  03/10/16           



                                                  16:23:00           



                                                  CST, stop           



                                                  date           



                                                  03/10/16           



                                                  16:23:00           



                                                  CST            

 

     Misc      0      No   Deuce                1,000 mL,           [facility]

a



     Medication      3-10           [facility]                Soln-IV,           l



               22:21:                               IV, Once,           [facility]



               00                                 first dose           



                                                  03/10/16           



                                                  16:21:00           



                                                  CST, stop           



                                                  date           



                                                  03/10/16           



                                                  16:21:00           



                                                  CST            

 

     clindamycin            Yes  Deuce                928.125           M

[facility]



               3-10           [facility]                mg,            l



               22:18:                               Soln-IV,           [facility]



               00                                 IV, Once,           



                                                  first dose           



                                                  03/10/16           



                                                  16:18:00           



                                                  CST, stop           



                                                  date           



                                                  03/10/16           



                                                  16:18:00           



                                                  CST            

 

     fentaNYL            No   Deuce                25 mcg =           Mem

oria



               3-10           [facility]                0.5 mL,           l



               22:05:                               Injection,           [facility]



               00                                 IV, Once,           



                                                  first dose           



                                                  03/10/16           



                                                  16:05:00           



                                                  CST, stop           



                                                  date           



                                                  03/10/16           



                                                  16:05:00           



                                                  CST            

 

     midazolam            No   Deuce                0.5 mg =           Me

[facility]ia



               3-10           [facility]                0.5 mL,           l



               22:05:                               Injection,           [facility]



               00                                 IV, Once,           



                                                  first dose           



                                                  03/10/16           



                                                  16:05:00           



                                                  CST, stop           



                                                  date           



                                                  03/10/16           



                                                  16:05:00           



                                                  CST            

 

     lidocaine            No   Deuce                3 mL,           [facility]

ia



               3-10           [facility]heat                Injection,           l



               21:58:                               IV, Once,           [facility]



               00                                 first dose           



                                                  03/10/16           



                                                  15:58:00           



                                                  CST, stop           



                                                  date           



                                                  03/10/16           



                                                  15:58:00           



                                                  CST            

 

     propofol            No   Deuce                120 mg =           [facility]

[facility]



               3-10           Wheat                12 mL,           l



               21:58:                               Emulsion,           [facility]



               00                                 IV, Once,           



                                                  first dose           



                                                  03/10/16           



                                                  15:58:00           



                                                  CST, stop           



                                                  date           



                                                  03/10/16           



                                                  15:58:00           



                                                  CST            

 

     midazolam            No   Deuce                0.5 mg =           [facility]

[facility]



               3-10           Wheat                0.5 mL,           l



               21:50:                               Injection,           [facility]



               00                                 IV, Once,           



                                                  first dose           



                                                  03/10/16           



                                                  15:50:00           



                                                  CST, stop           



                                                  date           



                                                  03/10/16           



                                                  15:50:00           



                                                  CST            

 

     fentaNYL            No   Deuce                25 mcg =           [facility]

[facility]



               3-10           Wheat                0.5 mL,           l



               21:50:                               Injection,           [facility]



               00                                 IV, Once,           



                                                  first dose           



                                                  03/10/16           



                                                  15:50:00           



                                                  CST, stop           



                                                  date           



                                                  03/10/16           



                                                  15:50:00           



                                                  CST            

 

     midazolam            No   Deuce                0.5 mg =           [facility]

[facility]



               3-10           Wheat                0.5 mL,           l



               21:10:                               Injection,           [facility]



               00                                 IV, Once,           



                                                  first dose           



                                                  03/10/16           



                                                  15:10:00           



                                                  CST, stop           



                                                  date           



                                                  03/10/16           



                                                  15:10:00           



                                                  CST            

 

     fentaNYL            No   Deuce                25 mcg =           [facility]

[facility]



               3-10           Wheat                0.5 mL,           l



               21:10:                               Injection,           [facility]



               00                                 IV, Once,           



                                                  first dose           



                                                  03/10/16           



                                                  15:10:00           



                                                  CST, stop           



                                                  date           



                                                  03/10/16           



                                                  15:10:00           



                                                  CST            

 

     fentaNYL            No   Deuce                25 mcg =           [facility]

[facility]



               3-10           Wheat                0.5 mL,           l



               21:05:                               Injection,           [facility]



               00                                 IV, Once,           



                                                  first dose           



                                                  03/10/16           



                                                  15:05:00           



                                                  CST, stop           



                                                  date           



                                                  03/10/16           



                                                  15:05:00           



                                                  CST            

 

     midazolam            No   Deuce                0.5 mg =           [facility]

[facility]



               3-10           Wheat                0.5 mL,           l



               21:05:                               Injection,           [facility]



               00                                 IV, Once,           



                                                  first dose           



                                                  03/10/16           



                                                  15:05:00           



                                                  CST, stop           



                                                  date           



                                                  03/10/16           



                                                  15:05:00           



                                                  CST            

 

     clindamycin            No   Yoan                900 mg, IV        

   [facility]



               3-10           Lei                Piggyback,           l



               20:00:                               Once,           [facility]



               00                                 infuse           



                                                  over 30           



                                                  minutes,           



                                                  first dose           



                                                  03/10/16           



                                                  14:00:00           



                                                  CST, stop           



                                                  date           



                                                  03/10/16           



                                                  14:00:00           



                                                  CST,           



                                                  Prophylaxi           



                                                  s              

 

     LR 1,000 mL            No   Ghassan K                1,000 mL,          

 [facility]



               3-10           Chun                IV, 30           l



               19:27:                               mL/hr,           [facility]



               00                                 start date           



                                                  03/10/16           



                                                  13:27:00           



                                                  CST            

 

     Lidocaine            No   Ghassan K                0.2 mL,           [facility]

[facility]



     2% 0.2 mL      3-10           Chun                Injection,           l



     IV Start      19:27:                               Subcutaneo           [facility]

hern[facility]



     [[facility]]      00                                 us, Once           



                                                  PRN for           



                                                  other (see           



                                                  comment),           



                                                  first dose           



                                                  03/10/16           



                                                  13:27:00           



                                                  CST            

 

     Seroquel            Yes                      100 mg,           [facility]

a



               3-09                               Oral,           l



               14:40:                               Daily, 0           [facility]



               00                                 Refill(s),           



                                                  migraines           

 

     acetaminoph            Yes                      1 tabs,           [facility]

[facility]



     en-HYDROcod      3-09                               Oral,           l



     one 325      14:40:                               q6hr, 0           [facility]



     mg-5 mg      00                                 Refill(s),           



     oral tablet                                         pain           

 

     traMADol 50            Yes                      50 mg = 1           [facility]

[facility]



     mg oral      3-09                               tabs,           l



     tablet      14:40:                               Oral,           [facility]



               00                                 q4hr, 0           



                                                  Refill(s),           



                                                  pain           

 

     amLODIPine-            Yes                      1 tabs,           [facility]

[facility]



     atorvastati      3-09                               Oral, qHS,           l



     n 5 mg-40      14:40:                               0              [facility]



     mg oral      00                                 Refill(s),           



     tablet                                         cholestero           



                                                  l/hyperten           



                                                  alexx           

 

     Osteo            Yes                      Oral,           [facility]



     Bi-Flex      3-                               Daily, 0           l



               14:40:                               Refill(s),           [facility]



               00                                 supplement           

 

     Nature's            Yes                      1,000 mg =           [facility]

[facility]



     Bounty Red      3-09                               2 caps,           l



     Krill Oil      14:40:                               Oral, BID,           [facility]

[facility]



     500 mg oral      00                                 0              



     capsule                                         Refill(s),           



                                                  supplement           

 

     aspirin 81            Yes                      81 mg = 1           [facility]

[facility]



     mg oral      3-09                               tabs,           l



     tablet      14:40:                               Oral,           [facility]



               00                                 Daily, 0           



                                                  Refill(s),           



                                                  supplement           

 

     Axert 12.5            Yes                      12.5 mg =           [facility]

[facility]



     mg oral      3-09                               1 tabs,           l



     tablet      14:40:                               Oral,           [facility]



               00                                 Once, PRN           



                                                  for            



                                                  migraine           



                                                  headache,           



                                                  may repeat           



                                                  dose once           



                                                  in 2           



                                                  hours, # 6           



                                                  tabs, 0           



                                                  Refill(s),           



                                                  migrainesm           



                                                  ay repeat           



                                                  dose once           



                                                  in 2 hours           

 

     Wellbutrin            Yes                      300 mg,           [facility]

[facility]



     XL        3-09                               Oral,           l



               14:40:                               q24hr, 0           [facility]



               00                                 Refill(s),           



                                                  migraines           







Immunizations







           Ordered Immunization Filled Immunization Date       Status     Commen

ts   Source



           Name       Name                                        

 

           Hx influenza            2019-10-23 Completed             [facility]



           vaccine-unspecified<            00:00:00                         [facility]

[facility]



           sup>1</sup>                                             

 

           pneumococcal            2019 Completed             [facility]



           23-valent             00:00:00                         [facility]



           vaccine<sup>3</sup>                                             

 

           Hx influenza            2011-10-25 Completed             [facility]



           vaccine-unspecified<            14:42:42                         [facility]

d[facility]



           sup>1</sup>                                             

 

           Hx influenza            2011-10-25 Completed             [facility]



           vaccine-unspecified<            14:42:42                         [facility]

d[facility]



           sup>2</sup>                                             







Vital Signs







             Vital Name   Observation Time Observation Value Comments     Source

 

             Heart Rate   2021 20:12:00                           [facility]

 [facility]

 

             Heart Rate   2021 20:08:00                           [facility]

 [facility]

 

             Systolic (mm Hg) 2021 20:08:00                           [facility]

[facility]

 

             Diastolic (mm Hg) 2021 20:08:00                           [facility]

[facility]

 

             Height       2021 20:08:00 165.1 cm                  [facility]

 [facility]

 

             Weight       2021 20:08:00                           [facility]

 [facility]

 

             BMI Calculated 2021 20:08:00                           [facility]

[facility]

 

             Systolic (mm Hg) 2020 15:59:00                           [facility]

[facility]

 

             Diastolic (mm Hg) 2020 15:59:00                           [facility]

[facility]

 

             Heart Rate   2020 15:59:00                           [facility]

 [facility]

 

             Height       2020 15:59:00 165.1 cm                  [facility]

 [facility]

 

             Weight       2020 15:59:00                           [facility]

 [facility]

 

             BMI Calculated 2020 15:59:00                           [facility]

[facility]

 

             Systolic (mm Hg) 2020 20:42:00                           [facility]

[facility]

 

             Diastolic (mm Hg) 2020 20:42:00                           [facility]

[facility]

 

             Heart Rate   2020 20:42:00                           [facility]

 [facility]

 

             Temperature Oral (F) 2020 20:42:00 98.2 F                    

[facility]

 

             Height       2020 20:42:00 170.18 cm                 [facility]

 [facility]

 

             Weight       2020 20:42:00                           [facility]

 [facility]

 

             BMI Calculated 2020 20:42:00                           [facility]

[facility]

 

             Systolic (mm Hg) 2019 21:53:00                           [facility]

[facility]

 

             Diastolic (mm Hg) 2019 21:53:00                           [facility]

[facility]

 

             Heart Rate   2019 21:53:00                           [facility]

 [facility]

 

             Temperature Oral (F) 2019 21:53:00 97.9 F                    

[facility]

 

             Height       2019 21:53:00 165.1 cm                  [facility]

 [facility]

 

             Weight       2019 21:53:00                           [facility]

 [facility]

 

             BMI Calculated 2019 21:53:00                           [facility]

[facility]

 

             Height       2019-10-25 14:54:00 165.1 cm                  [facility]

 [facility]

 

             Weight       2019-10-25 14:54:00                           [facility]

 [facility]

 

             BMI Calculated 2019-10-25 14:54:00                           [facility]

[facility] [place]

 

             Systolic (mm Hg) 2019-10-25 14:54:00                           [facility]

[facility]

 

             Diastolic (mm Hg) 2019-10-25 14:54:00                           [facility]

[facility]

 

             Heart Rate   2019-10-25 14:54:00                           [facility]

 [facility]

 

             Temperature Oral (F) 2019-10-25 14:54:00 97.6 F                    

[facility]

 

             Systolic (mm Hg) 2019-10-10 15:37:00                           [facility]

[facility]

 

             Diastolic (mm Hg) 2019-10-10 15:37:00                           [facility]

[facility]

 

             Heart Rate   2019-10-10 15:37:00                           [facility]

 [facility]

 

             Temperature Oral (F) 2019-10-10 15:37:00 98.2 F                    

[facility]

 

             Height       2019-10-10 15:37:00 165.1 cm                  [facility]

 [facility]

 

             Weight       2019-10-10 15:37:00                           [facility]

 [facility]

 

             BMI Calculated 2019-10-10 15:37:00                           [facility]

[facility] [place]

 

             Weight       2019 15:18:00                           [facility]

 [facility]

 

             BMI Calculated 2019 15:18:00                           [facility]

[facility] [place]

 

             Height       2019 15:18:00 165.1 cm                  [facility]

 [facility]

 

             Temperature Oral (F) 2019 15:18:00 98.4 F                    

[facility]

 

             Heart Rate   2019 15:18:00                           [facility]

 [facility]

 

             Systolic (mm Hg) 2019 15:18:00                           [facility]

[facility]

 

             Diastolic (mm Hg) 2019 15:18:00                           [facility]

[facility]

 

             Height       2019 19:16:00 165.1 cm                  [facility]

 [facility]

 

             BMI Calculated 2019 19:16:00                           [facility]

[facility]

 

             Weight       2019 19:16:00                           [facility]

 [facility]

 

             Heart Rate   2019 19:16:00                           [facility]

 [facility]

 

             Systolic (mm Hg) 2019 19:16:00                           [facility]

[facility]

 

             Diastolic (mm Hg) 2019 19:16:00                           [facility]

[facility]

 

             Height       2019 14:26:00 167.01 cm                 [facility]

 [facility]

 

             BMI Calculated 2019 14:26:00                           [facility]

[facility]

 

             Weight       2019 14:26:00                           [facility]

 [facility]

 

             Heart Rate   2019 14:26:00                           [facility]

 [facility]

 

             Temperature Oral (F) 2019 14:26:00 97.9 F                    

[facility]

 

             Systolic (mm Hg) 2019 14:26:00                           [facility]

[facility]

 

             Diastolic (mm Hg) 2019 14:26:00                           [facility]

[facility]

 

             Systolic (mm Hg) 2018 18:33:00                           [facility]

[facility]

 

             Diastolic (mm Hg) 2018 18:33:00                           [facility]

[facility]

 

             Heart Rate   2018 18:33:00                           [facility]

 [facility]

 

             Weight       2018 18:33:00                           [facility]

 [facility]

 

             BMI Calculated 2018 18:31:00                           [facility]

[facility]

 

             Weight       2018 18:31:00                           [facility]

 [facility]

 

             Systolic (mm Hg) 2018 18:31:00                           [facility]

[facility]

 

             Diastolic (mm Hg) 2018 18:31:00                           [facility]

[facility]

 

             Height       2018 18:31:00 170.18 cm                 [facility]

 [facility]

 

             Temperature Oral (F) 2018 18:31:00 98.3 F                    

[facility]

 

             Heart Rate   2018 18:31:00                           [facility]

 [facility]

 

             Weight       2018 16:14:00                           [facility]

 [facility]

 

             Height       2018 16:14:00 170.18 cm                 [facility]

 [facility]

 

             BMI Calculated 2018 16:14:00                           [facility]

[facility]

 

             Heart Rate   2018 16:14:00                           [facility]

 [facility]

 

             Systolic (mm Hg) 2018 16:14:00                           [facility]

[facility]

 

             Diastolic (mm Hg) 2018 16:14:00                           [facility]

[facility]

 

             BMI Calculated 2018 15:06:00                           [facility]

[facility]

 

             Height       2018 15:06:00 170.18 cm                 [facility]

 [facility]

 

             Weight       2018 15:06:00                           [facility]

 [facility]

 

             Systolic (mm Hg) 2018 15:06:00                           [facility]

[facility]

 

             Diastolic (mm Hg) 2018 15:06:00                           [facility]

[facility]

 

             Heart Rate   2018 15:06:00                           [facility]

 [facility]

 

             Temperature Oral (F) 2018 15:06:00 97.9 F                    

[facility]

 

             Weight       2017 16:17:00                           [facility]

 [facility]

 

             Height       2017 16:17:00 170.18 cm                 [facility]

 [facility]

 

             BMI Calculated 2017 16:17:00                           [facility]

[facility]

 

             Respitory Rate 2016 01:25:00                           [facility]

[facility]

 

             Systolic (mm Hg) 2016 00:50:00                           [facility]

[facility]

 

             Respitory Rate 2016 00:50:00                           [facility]

[facility]

 

             Heart Rate   2016 00:50:00                           [facility]

 [facility]

 

             Systolic (mm Hg) 2016 00:40:00                           [facility]

[facility]

 

             Respitory Rate 2016 00:40:00                           [facility]

[facility]

 

             Heart Rate   2016 00:40:00                           [facility]

 [facility]

 

             Heart Rate   2016 00:30:00                           [facility]

 [facility]

 

             Systolic (mm Hg) 2016 00:30:00                           [facility]

[facility]

 

             Temperature Oral (F) 2016-03-10 23:50:00 37.1 Sherlyn                  

[facility]

 

             Height       2016-03-10 19:23:00 169 cm                    [facility]

 [facility]

 

             Weight       2016-03-10 19:23:00                           [facility]

 [place]

 

             Temperature Oral (F) 2016-03-10 19:23:00 36.6 Sherlyn                  

[facility] [place]

 

             Height       2016 14:13:00 170 cm                    [facility]

 [place]

 

             Weight       2016 14:13:00                           [facility]

 [place]







Procedures







                Procedure       Date / Time Performed Performing Clinician Jericho beltre

 

                Diabetic retinal eye 2020 06:00:00                 Dillan Mart



                exam<sup>1</sup>                                 

 

                Mammogram       2017-07-15 05:00:00                 [facility] hernandes

 

                Colonoscopy<sup>2</sup> 2017 06:00:00                 Hakeem

rial [place]

 

                OPEN REDUCTION INTERNAL 2016-03-10 22:13:00 Yoan Lei [facility]

rial [place]



                FIXATION RADIUS                                 



                INTRA-ARTICULAR W/3                                 



                FRAGMENTS 02678                                 



                (Right)<sup>1</sup>                                 

 

                Repair of       2016-03-10 06:00:00                 Doreen Her hernandes



                wrist<sup>1</sup>                                 

 

                skin cancer removed from 2012 00:00:00                 [facility]

[facility] [place]



                arm                                             

 

                Skin cancer of                                  [facility]



                arm<sup>2</sup>                                 

 

                Procedure<sup>2</sup>                                 [facility]

[facility]

 

                Tubal ligation                                  [facility] [place]

 

                Eye operation                                   [facility] [place]

 

                Biopsy of breast                                 [facility]

n

 

                bilateral radial                                 [facility]

n



                kerototomy                                      

 

                bilateral tubal ligation                                 [facility]

l [place]

 

                colonoscopy                                     [facility] [place]







Encounters







        Start   End     Encounter Admission Attending Care    Care    Encounter 

Source



        Date/Time Date/Time Type    Type    Clinicians Facility Department ID   

   

 

        2022 Outpatient         [facility],  [facility]     [id number]

[address] [place]



        00:00:00 00:00:00                 RAZIUDDIN                 169     Meth

farhana



                                                                        

 

        2022 Outpatient         [facility]     [id number]

[address] [place]



        00:00:00 00:00:00                 RAZIUDDIN                 979     Meth

farhana



                                                                        st

 

        2021 Outpatient         [facility]     [id number]

[address] [place]



        00:00:00 00:00:00                 RAZIUDDIN                 834     Meth

farhana



                                                                        st

 

        2021 Between                 nullFlavo [facility] 3467

[id number] [facility]



        14:18:24 14:18:24 Visit                   r       Medicine 62      l



                                                        Rudy Hines

n

 

        2021 Between                 nullFlavo [facility] 3467

[id number] [facility]



        00:56:47 00:56:47 Visit                   r       Medicine 61      l



                                                        Rudy martinez

 

        2021 Outpatient                 nullFlavo [facility] 3

[id number] [facility]



        20:00:00 05:59:59                         r       Medicine 52      l



                                                        Rudy Hines

n

 

        2021 Phone                   nullFlavo [facility] 3467

[id number] [facility]



        21:38:38 05:59:59 Message                 r       Medicine 13      l



                                                        Rudy martinez

 

        [facility]21 [facility]     [id number]

[address] [place]



        00:00:00 00:00:00                 DAYLIN Arthur     Method

i



                                                                        st

 

        2021-11-15 2021 Between                 nullFlavo [facility] 3467

[id number] [facility]



        15:33:59 15:33:59 Visit                   r       Medicine 59      l



                                                        Rudy martinez

 

        2021 2021-10-05 Inpatient         SOLIPURAM, [facility]     074     [id number]

[id number] [place]



        00:00:00 00:00:00                 MELISSA                    329     Method

i



                                                                        st

 

        2021 Outpatient         OPPERMANN, [facility]     2100

[id number] [place]



        00:00:00 00:00:00                 JULIANNA                 104     Method

i



                                                                        st

 

        2021 Outpatient         AHMED,  [facility]     [id number]

[address] [place]



        00:00:00 00:00:00                 RAZIUDDIN                 273     Meth

farhana



                                                                        st

 

        2021 Outpatient         EKERUO, [facility]     [id number]

[address] [place]



        00:00:00 00:00:00                 DAYLIN                  787     Method

i



                                                                        

 

        2021 Outpatient         DAOURA, [facility]     [id number]

[address] [place]



        00:00:00 00:00:00                 MAGY                 546     Method

i



                                                                        

 

        2021 Inpatient         MATHIVANAN, [facility]     064     [id number]

[id number] [place]



        00:00:00 00:00:00                 MELVIN                   275     Method

i



                                                                        

 

        2021 Outpatient         AHMED,  [facility]     [id number]

068 [place]



        00:00:00 00:00:00                 RAZIUDDIN                 199     Meth

farhana



                                                                        

 

        2021 Outpatient         EKERUO, [facility]     021     [id number]

[address] [place]



        00:00:00 00:00:00                 DAYLIN                  640     Method

i



                                                                        

 

        2021 Outpatient         EKERUO, [facility]     [id number]

412 [place]



        00:00:00 00:00:00                 DAYLIN                  435     Method

i



                                                                        st

 

        2021 Outpatient         EKERUO, [facility]     [id number]

412 [place]



        00:00:00 00:00:00                 DAYLIN                  537     Method

i



                                                                        st

 

        2021 Outpatient         EKERUO, [facility]     [id number]

029 [place]



        00:00:00 00:00:00                 DAYLIN                  709     Method

i



                                                                        

 

        2021 Inpatient         SOLIPURAM, [facility]     064     [id number]

14844 [place]



        00:00:00 00:00:00                 MELISSA                    685     Method

i



                                                                        st

 

        [facility]         [facility]     [id number]

046 [place]



        00:00:00 00:00:00                 RAZIUDDIN                 101     Meth

farhana



                                                                        st

 

        2021 Outpatient                 nullFlavo [place] [id number]

[id number] [facility]



        01:00:00 04:59:00                         r       [place] 58      l



                                                        [place]         Meredith

nn

 

        2021 Outpatient         AHMED,  [facility]    PUL     [id number]   

 [facility]



        20:00:00 23:59:00                 RAZIUDDIN                         

 

        [facility]21 [facility]         [facility]     [id number]

[address] [place]



        00:00:00 00:00:00                 RAZIUDDIN                 598     Meth

farhana



                                                                        st

 

        2021-04-15 2021 [facility]     [facility]     2100

[id number] [place]



        00:00:00 00:00:00                 MELVIN                   060     Method

i



                                                                        st

 

        2021 Outpatient                 [facility]     [id number]

422 [place]



        00:00:00 00:00:00                                         470     Method

i



                                                                        st

 

        2021 Outpatient                 nullFlavo [facility] 3

[id number] [facility]



        19:30:00 04:59:59                         r       Medicine 51      l



                                                        Rudy Hines

n

 

        2021 Outpatient                 [facility]     [id number]

[address] [place]



        00:00:00 00:00:00                                         398     Method

i



                                                                        st

 

        2021 Between                 nullFlavo [facility] 3467

[id number] [facility]



        13:38:03 13:38:03 Visit                   r       Medicine 57      l



                                                        Rudy Hines

n

 

        2021 Outpatient         [facility],  [facility]     [id number]

[address] [place]



        00:00:00 00:00:00                 RAZIUDDIN                 554     Meth

farhana



                                                                        st

 

        2021 [facility]         [facility]     025     2100

[id number] [place]



        00:00:00 00:00:00                 MELVIN                   541     Method

i



                                                                        st

 

        2021 [facility]         [facility]     Ayse     2100

[id number] [place]



        00:00:00 00:00:00                 MELVIN                   559     Method

i



                                                                        

 

        2020 Inpatient         [facility]     012     

[id number] [place]



        00:00:00 00:00:00                 MELVIN                   271     Method

i



                                                                        

 

        2020 Outpatient                 nullFlavo [facility] 3

[id number] [facility]



        16:30:00 05:59:59                         r       Medicine 50      l



                                                        Rudy Hines

n

 

        2020 Inpatient         JESSICA, [facility]     012     

44749 [place]



        00:00:00 00:00:00                 MELISSA                    464     Method

i



                                                                        

 

        2020-10-23 2020 Inpatient         [facility]     012     

[id number] [place]



        00:00:00 00:00:00                 MELISSA                    557     Method

i



                                                                        

 

        2020-10-23 2020-10-24 Outpt Diag                 nullFlavo [facility]    [id number]

[id number] [place]



        18:10:00 04:59:00 Services                 r       Outpatient 06      l



                                                        Imaging         [place]



                                                        [place]         

 

        2020-10-21 2020-10-22 Between                 nullFlavo [facility] 3467

[id number] [facility]



        17:58:19 17:58:19 Visit                   r       Medicine 56      l



                                                        Rudy Hines

n

 

        2020-10-13 2020-10-14 Outpatient                 nullFlavo [facility] 3

[id number] [facility]



        15:15:00 04:59:59                         r       Medicine 49      l



                                                        Rudy Hines

n

 

        2020 Outpt Diag                 nullFlavo [facility]    [id number]

[id number] [facility]



        17:02:00 04:59:00 Services                 r       Outpatient 05      l



                                                        Imaging         [place]



                                                        [place]         

 

        2020 Ambulatory                 nullFlavo [facility]    [id number]

[id number] [facility]



        19:00:00 19:00:00 Pre-Reg                 r       Nephrology 46      l



                                                        Rudy Hines

n

 

        2020 Outpatient                 [facility]    [id number]

365 [facility]



        11:15:00 11:15:00                                         47      l



                                                                        [place]

 

        2020 Outpatient                 nullFlavo [facility]    [id number]

[id number] [facility]



        19:45:00 04:59:59                         r       Nephrology 48      l



                                                        Rudy Hines

n

 

        2020 Outpatient         MANGIN, [facility]     [id number]

[address] [place]



        00:00:00 00:00:00                 EMILIA                    832     Method

i



                                                                        st

 

        2020 Phone                   nullFlavo [facility]    [id number]

55 [facility]



        16:17:50 04:59:59 Message                 r       Nephrology 12      dennis Hines

michelle

 

        2020 Outpatient                 nullFlavo [facility]    [id number]

[id number] [facility]



        19:00:00 04:59:59                         r       Nephrology 41      l



                                                        Rudy Hines

michelle

 

        2020 Phone                   nullFlavo [facility]    [id number]

55 [facility]



        18:35:51 04:59:59 Message                 r       Nephrology 11      l



                                                        Saundra         [place]

 

        2020 Outpatient                 nullFlavo [facility] 3

[id number] [facility]



        15:15:00 04:59:59                         r       Medicine 45      dennis Hines

michelle

 

        2020 Ambulatory                 nullFlavo [facility] 3

[id number] [facility]



        15:15:00 15:15:00 Pre-Reg                 r       Medicine 44      l



                                                        Rudy Hines

michelle

 

        2020 Phone                   nullFlavo [facility]    [id number]

55 [place]



        13:23:32 04:59:59 Message                 r       Nephrology 10      dennis Hines

michelle

 

        2020 Between                 nullFlavo [facility] 3467

[id number] [facility]



        14:14:54 14:14:54 Visit                   r       Medicine 52      dennis Hines

michelle

 

        2020 Outpatient                 [facility]    IE    [id number]

365 [facility]



        11:30:00 11:30:00                                         43      dennis



                                                                        [place]

 

        2020 2020-04-15 Phone                   nullFlavo [facility] 3467

[id number] [facility]



        20:00:15 04:59:59 Message                 r       Medicine 09      dennis Hines

michelle

 

        2020-04-10 2020 Phone                   nullFlavo [facility] 3467

[id number] [facility]



        17:18:28 04:59:59 Message                 r       Medicine 08      dennis Hines

michelle

 

        2020-04-10 2020 Phone                   nullFlavo [facility]    [id number]

55 [place]



        13:26:55 04:59:59 Message                 r       Nephrology 07      l



                                                        Rudy Hines

michelle

 

        2020 Outpatient                 nullFlavo [facility] 3

[id number] [facility]



        20:15:00 05:59:59                         r       Medicine 42      dennis martinez

 

        2020 Phone                   nullFlavo [facility] Family 3467

[id number] [place]



        14:22:27 05:59:59 Message                 r       Medicine 06      dennis martinez

 

        2019 Phone                   nullFlavo [facility] Family 3467

[id number] [place]



        16:06:04 05:59:59 Message                 r       Medicine 05      dennis martinez

 

        2019 Between                 nullFlavo [facility]    [id number]

75 [place]



        20:05:03 20:05:03 Visit                   r       Nephrology 45      dennis Mart

 

        2019 Outpatient                 nullFlavo [facility]    [id number]

[id number] [facility]



        20:45:00 05:59:59                         r       Nephrology 38      dennis Hines

michelle

 

        2019 Between                 nullFlavo MG    [id number]

75 [place]



        00:07:10 00:07:10 Visit                   r       Nephrology 43      dennis Mart

 

        2019 Outpatient                 [facility]    IE    [id number]

365 [place]



        09:00:00 09:00:00                                         39      dennis



                                                                        [place]

 

        2019 Between                 nullFlavo [facility] Family 3467

[id number] [place]



        21:47:12 21:47:12 Visit                   r       Medicine 41      dennis Hines

michelle

 

        2019-10-29 2019-10-30 Between                 nullFlavo [facility] Family 3467

[id number] [place]



        22:13:13 22:13:13 Visit                   r       Medicine 40      dennis Hines

michelle

 

        2019-10-25 2019-10-26 Outpatient                 nullFlavo [facility] Family 3

[id number] [place]



        14:45:00 04:59:59                         r       Medicine 40      dennis Hines

michelle

 

        2019-10-17 2019-10-17 Ambulatory                 [facility]Flavo [facility] 3

[id number] [place]



        16:00:00 16:00:00 Pre-Reg                 r       Medicine 36      dennis Hines

michelle

 

        2019-10-10 2019-10-11 Outpatient                 nullFlavo MG    [id number]

[id number] [facility]



        15:00:00 04:59:59                         r       Nephrology 35      dennis Hines

michelle

 

        2019-10-10 2019-10-10 Outpatient                 IE    IE    [id number]

365 [place]



        12:00:00 12:00:00                                         37      dennis Mart

 

        2019 Phone                   nullFlavo [facility] 3467

[id number] [facility]



        15:15:06 04:59:59 Message                 r       Medicine 04      dennis martinez

 

        2019 Phone                   nullFlavo MG    [id number]

55 [place]



        15:10:51 04:59:59 Message                 r       Nephrology 03      dennis martinez

 

        2019 Ambulatory                 nullFlavo [facility]    [id number]

[id number] [facility]



        20:00:00 20:00:00 Pre-Reg                 r       Nephrology 34      dennis martinez

 

        2019 Between                 nullFlavo [facility]    [id number]

75 [place]



        20:15:16 20:15:16 Visit                   r       Nephrology 34      dennis Mart

 

        2019 Phone                   nullFlavo [facility]    [id number]

55 [place]



        15:29:54 04:59:59 Message                 r       Nephrology 02      dennis Rosarioann

 

        2019 Ambulatory                 nullFlavo [facility]    [id number]

[id number] [place]



        16:30:00 16:30:00 Pre-Reg                 r       Nephrology 29      dennis martinez

 

        2019 Ambulatory                 nullFlavo [facility]    [id number]

[id number] [facility]



        16:15:00 16:15:00 Pre-Reg                 r       Nephrology 30      dennis martinez

 

        2019 Ambulatory                 nullFlavo [facility] 3

[id number] [facility]



        15:15:00 15:15:00 Pre-Reg                 r       Medicine 31      dennis martinez

 

        2019 Inpatient E       KEIKO,   [facility]     TELE    [id number]

69 [place]



        10:05:00 17:55:00                 GOPAL                         Medica

[facility]

 

        [facility]    [id number]

427 [place]



        21:36:00 19:00:00                 GOPAL                         Medica

[facility]

 

        2019 Outpatient                 nullFlavo [facility] 346

[id number] [place]



        20:40:00 04:59:59                         r       Care    33      dennis Mart

 

        2019 Outpatient                 nullFlavo [facility] Family 3

[id number] [facility]



        15:15:00 04:59:59                         r       Medicine 32      l



                                                        Rudy Hines

michelle

 

        2019 Between                 nullFlavo [facility] Family 3467

[id number] [place]



        19:00:16 19:00:16 Visit                   r       Medicine 29      l



                                                        Rudy Hines

michelle

 

        2019 2019-03-15 Between                 nullFlavo [facility]    [id number]

75 [facility]



        15:02:37 15:02:37 Visit                   r       Nephrology 27      l



                                                        Rudy Hines

michelle

 

        2019 2019-03-15 Outpatient                 nullFlavo [facility]    [id number]

[id number] [facility]



        18:45:00 04:59:59                         r       Nephrology 28      l



                                                        Rudy Hines

michelle

 

        2019 2019-03-15 Outpatient                 nullFlavo [facility] Family 3

[id number] [facility]



        14:15:00 04:59:59                         r       Medicine 22      l



                                                        Rudy Hines

michelle

 

        2019 Between                 nullFlavo MG    [id number]

75 [facility]



        23:59:47 23:59:47 Visit                   r       Nephrology 26      dennis Hines

michelle

 

        2019 Between                 nullFlavo [facility]    [id number]

75 [place]



        22:00:33 22:00:33 Visit                   r       Nephrology 24      dennis Hines

michelle

 

        2019 Between                 nullFlavo MG    [id number]

75 [place]



        04:48:44 04:48:44 Visit                   r       Nephrology 23      dennis Hines

michelle

 

        2019 Ambulatory                 nullFlavo [facility]    [id number]

[id number] [facility]



        20:30:00 20:30:00 Pre-Reg                 r       Nephrology 27      dennis Hines

michelle

 

        2019 Ambulatory                 nullFlavo [facility]    [id number]

[id number] [facility]



        18:40:00 18:40:00 Pre-Reg                 r       Nephrology 24      dennis Hines

michelle

 

        2019 Ambulatory                 nullFlavo [facility]    [id number]

[id number] [facility]



        15:30:00 15:30:00 Pre-Reg                 r       Internal 25      dennis Grant         

 

        2018 2018-10-20 Ambulatory                 nullFlavo [facility]    [id number]

[id number] [facility]



        12:45:00 12:45:00 Pre-Reg                 r       Internal 23      l



                                                        Manuel Grant         

 

        2018 Outpatient                 nullFlavo [facility]    [id number]

[id number] [facility]



        19:15:00 04:59:59                         r       Nephrology 26      dennis Hines

michelle

 

        2018 Outpatient                 nullFlavo [facility]    [id number]

[id number] [facility]



        18:00:00 04:59:59                         r       Nephrology 21      l



                                                        Rudy Hines

michelle

 

        2018 Outpatient                 nullFlavo [facility] Family 3

[id number] Mem[facility]



        18:30:00 04:59:59                         r       Medicine 20      dennis Hines

michelle

 

        2018 Outpatient                 nullFlavo [facility]    [id number]

[id number] [facility]



        16:00:00 04:59:59                         r       Nephrology 19      dennis Hines

michelle

 

        2018 Outpatient                 nullFlavo [facility] Family 3

[id number] [facility]



        15:00:00 04:59:59                         r       Medicine 18      dennis Hines

michelle

 

        2017 Outpatient                 [facility]    IE    [id number]

365 [facility]



        10:40:00 10:40:00                                         16      dennis Mart

 

        2017 Outpatient                 [facility]    [facility]    [id number]

365 [facility]



        11:30:00 11:30:00                                         17      dennis Mart

 

        2017 Outpatient                 [facility]    [facility]    [id number]

365 [facility]



        11:40:00 11:40:00                                         11      dennis Mart

 

        2017 Outpatient                 [facility]    CHELSEYIE    [id number]

365 [facility]



        14:30:00 14:30:00                                         15      dennis Mart

 

        2017 Outpatient                 [facility]    [facility]    [id number]

365 [facility]



        10:00:00 10:00:00                                         08      dennis Mart

 

        2017 Bedded                  nullFlavo [facility] [id number]

375 [facility]



        11:48:35 14:30:00 Outpatient                 xuan Mart 13      l



                                                        Jyoti beard

 

        2017 Outpatient                 [facility]    [facility]    [id number]

365 [facility]



        13:15:00 13:15:00                                         14      dennis Mart

 

        2016 Outpatient                 IE    IE    [id number]

365 [facility]



        09:30:00 09:30:00                                         12      dennis Mart

 

        2016 Outpatient                 [facility]    IE    [id number]

365 [facility]



        09:30:00 09:30:00                                         13      dennis Mart

 

        2016 Outpatient                 IE    IE    [id number]

365 [facility]



        10:20:00 10:20:00                                         09      dennis Mart

 

        2016 Outpatient                 IE    IE    [id number]

365 [facility]



        13:00:00 13:00:00                                         04      dennis Mart

 

        2016 Outpatient                 IE    IE    [id number]

365 [facility]



        11:15:00 11:15:00                                         06      dennis Mart

 

        2016 OP Therapy                 nullFlavo [facility]     [id number]

[id number] [facility]



        13:00:00 04:59:00 Patients                 r       [place] 03      dennis



                                                        Jl           Barron

 

        2016 OP Therapy                 nullFlavo [facility]     [id number]

[id number] [facility]



        17:39:00 04:59:00 Patients                 r       Regulo 02      dennis



                                                        Jl           Barron

 

        2016 OP Therapy                 nullFlavo [facility] [place]

[id number] [facility]



        19:00:00 04:59:00 Patients                 r       [place]   01      dennis Mart

 

        2016 Outpt Diag                 nullFlavo [facility]    [id number]

05348 [facility]



        16:14:00 04:59:00 Services                 r       Outpatient 04      l



                                                        Imaging         [place]



                                                        [place]         

 

        2016 OP Therapy                 nullFlavo [facility] [place]

[id number] [facility]



        19:00:00 04:59:00 Patients                 r       [place]   00      dennis Mart

 

        2016 Outpatient                 IE    IE    [id number]

365 [facility]



        10:20:00 10:20:00                                         01      dennis Mart

 

        2016-05-10 2016 Outpt Diag                 nullFlavo [facility]    [id number]

00415 [facility]



        14:59:00 04:59:00 Services                 r       Outpatient 03      l



                                                        Imaging         Barron Zarate            

 

        2016 Outpt Diag                 nullFlavo [facility]    [id number]

90900 [facility]



        18:11:00 04:59:00 Services                 r       Outpatient 01      l



                                                        Imaging         [place]



                                                        [place]         

 

        2016-03-10 2016-03-10 Outpatient                 nullFlavo [facility]    [id number]

   [facility]



        12:50:31 19:25:00                         r                       l



                                                                        [place]

 

        2016 2016-02-10 Outpt Diag                 nullFlavo [facility]    [id number]

[id number] [facility]



        12:58:00 05:59:00 Services                 r       Outpatient 00      l



                                                        Imaging         [place]



                                                        [place]         

 

        2016 Outpatient                 IE    [facility]    [id number]

365 [facility]



        10:15:00 10:15:00                                         02      l



                                                                        aBrron

 

        2015 Outpatient                 [facility]    [id number]

365 [facility]



        11:30:00 11:30:00                                         00      l



                                                                        [place]

 

        2014 Outpatient                 nullFlavo [facility] 3467

2273_3 Memoria



        01:16:00 05:59:00                         r       [place] [id number] l



                                                        [place] 1       [place]

nn

 

        2014 Outpatient                 nullFlavo       [id number]

[id number] [facility]



        19:16:00 19:16:00                         r       [place] 01      l



                                                                        [place]

 

        2014 Outpatient                 nullFlavo       [id number]

13090 [facility]



        19:43:00 19:43:00                         r       [place] 00      l



                                                                        [place]







Results







           Test Description Test Time  Test Comments Results    Result Comments 

Source









                          SARS-CoV-2 (COVID-19) RNA [Presence] in Respiratory sp

ecimen by 2021 

22:34:30                                



                    EDWARD with probe detection                     









                      Test Item  Value      Reference Range Interpretation Comme

nts









             SARS-CoV-2 (COVID-19) RNA [Presence] in Respiratory Not detected No

t-Detected              



             specimen by EDWARD with probe detection (test code =                  

                      



             10538-8)                                            

 

             Whether patient is employed in a healthcare setting                

                        



             (test code = 20337-5)                                        

 

             Whether the patient has symptoms related to condition              

                          



             of interest (test code = 37871-2)                                  

      

 

             Patient was hospitalized because of this condition                 

                       



             (test code = 67913-4)                                        

 

             Whether the patient was admitted to intensive care unit            

                            



             (ICU) for condition of interest (test code = 87978-8)              

                          

 

             Whether patient resides in a congregate care setting               

                         



             (test code = 89607-8)                                        



SARS-CoV-2 (COVID-19) RNA [Presence] in Respiratory specimen by EDWARD with probe 
[id number] 22:35:42





             Test Item    Value        Reference Range Interpretation Comments

 

             SARS-CoV-2 (COVID-19) RNA Not detected Not-Detected              



             [Presence] in Respiratory                                        



             specimen by EDWARD with probe                                        



             detection (test code = 08063-2)                                    

    

 

             Whether patient is employed in a                                   

     



             healthcare setting (test code =                                    

    



             45617-4)                                            

 

             Whether the patient has symptoms                                   

     



             related to condition of interest                                   

     



             (test code = 91528-7)                                        

 

             Patient was hospitalized because                                   

     



             of this condition (test code =                                     

   



             26975-8)                                            

 

             Whether the patient was admitted                                   

     



             to intensive care unit (ICU) for                                   

     



             condition of interest (test code                                   

     



             = 23539-0)                                          

 

             Whether patient resides in a                                       

 



             congregate care setting (test                                      

  



             code = 87258-3)                                        



SARS-CoV-2 (COVID-19) RNA [Presence] in Respiratory specimen by EDWARD with probe 
[id number] 22:46:10





             Test Item    Value        Reference Range Interpretation Comments

 

             SARS-CoV-2 (COVID-19) RNA Not detected Not-Detected              



             [Presence] in Respiratory                                        



             specimen by EDWARD with probe                                        



             detection (test code = 40006-3)                                    

    

 

             Whether patient is employed in a                                   

     



             healthcare setting (test code =                                    

    



             37563-8)                                            

 

             Whether the patient has symptoms                                   

     



             related to condition of interest                                   

     



             (test code = 13184-7)                                        

 

             Patient was hospitalized because                                   

     



             of this condition (test code =                                     

   



             89448-8)                                            

 

             Whether the patient was admitted                                   

     



             to intensive care unit (ICU) for                                   

     



             condition of interest (test code                                   

     



             = 25710-6)                                          

 

             Whether patient resides in a                                       

 



             congregate care setting (test                                      

  



             code = 88877-2)                                        



SARS-CoV-2 (COVID-19) RNA [Presence] in Respiratory specimen by EDWARD with probe 
[id number] 01:54:24





             Test Item    Value        Reference Range Interpretation Comments

 

             SARS-CoV-2 (COVID-19) RNA Not detected Not-Detected              



             [Presence] in Respiratory                                        



             specimen by EDWARD with probe                                        



             detection (test code = 77778-8)                                    

    

 

             Whether patient is employed in a                                   

     



             healthcare setting (test code =                                    

    



             72384-2)                                            

 

             Whether the patient has symptoms                                   

     



             related to condition of interest                                   

     



             (test code = 45244-7)                                        

 

             Patient was hospitalized because                                   

     



             of this condition (test code =                                     

   



             17254-9)                                            

 

             Whether the patient was admitted                                   

     



             to intensive care unit (ICU) for                                   

     



             condition of interest (test code                                   

     



             = 44758-6)                                          

 

             Whether patient resides in a                                       

 



             congregate care setting (test                                      

  



             code = 58097-6)                                        



SARS-CoV-2 (COVID-19) RNA [Presence] in Respiratory specimen by EDWARD with probe 
[id number] 21:44:06





             Test Item    Value        Reference Range Interpretation Comments

 

             SARS-CoV-2 (COVID-19) RNA Not detected Not-Detected              



             [Presence] in Respiratory                                        



             specimen by EDWARD with probe                                        



             detection (test code = 59118-4)                                    

    

 

             Whether patient is employed in a                                   

     



             healthcare setting (test code =                                    

    



             36738-6)                                            

 

             Whether the patient has symptoms                                   

     



             related to condition of interest                                   

     



             (test code = 53210-9)                                        

 

             Patient was hospitalized because                                   

     



             of this condition (test code =                                     

   



             00370-1)                                            

 

             Whether the patient was admitted                                   

     



             to intensive care unit (ICU) for                                   

     



             condition of interest (test code                                   

     



             = 92820-0)                                          

 

             Whether patient resides in a                                       

 



             congregate care setting (test                                      

  



             code = 16048-8)                                        



SARS-CoV-2 (COVID-19) RNA [Presence] in Respiratory specimen by EDWARD with probe 
[id number] 00:36:59





             Test Item    Value        Reference Range Interpretation Comments

 

             SARS-CoV-2 (COVID-19) RNA Not detected Not-Detected              



             [Presence] in Respiratory                                        



             specimen by EDWARD with probe                                        



             detection (test code = 79258-3)                                    

    



SARS-CoV-2 (COVID-19) RNA [Presence] in Respiratory specimen by EDWARD with probe 
[id number] 17:35:35





             Test Item    Value        Reference Range Interpretation Comments

 

             SARS-CoV-2 (COVID-19) RNA Not detected Not-Detected              



             [Presence] in Respiratory                                        



             specimen by EDWARD with probe                                        



             detection (test code = 26135-2)                                    

    



SARS-CoV-2 (COVID-19) RNA [Presence] in Respiratory specimen by EDWARD with probe 
[id number] 18:03:30





             Test Item    Value        Reference Range Interpretation Comments

 

             SARS-CoV-2 (COVID-19) RNA Not detected Not-Detected              



             [Presence] in Respiratory                                        



             specimen by EDWARD with probe                                        



             detection (test code = 45170-7)                                    

    



SARS-CoV-2 (COVID-19) RNA [Presence] in Respiratory specimen by EDWARD with probe 
[id number] 10:06:03





             Test Item    Value        Reference Range Interpretation Comments

 

             SARS-CoV-2 (COVID-19) RNA Not detected Not-Detected              



             [Presence] in Respiratory                                        



             specimen by EDWARD with probe                                        



             detection (test code = 91037-1)                                    

    



SARS-CoV-2 (COVID-19) RNA [Presence] in Respiratory specimen by EDWARD with probe 
[id number] 05:11:58





             Test Item    Value        Reference Range Interpretation Comments

 

             SARS-CoV-2 (COVID-19) RNA Not detected Not-Detected              



             [Presence] in Respiratory                                        



             specimen by EDWARD with probe                                        



             detection (test code = 74545-1)                                    

    



SARS-CoV-2 (COVID-19) RNA [Presence] in Respiratory specimen by EDWARD with probe 
[id number] 03:34:16





             Test Item    Value        Reference Range Interpretation Comments

 

             SARS-CoV-2 (COVID-19) RNA Not detected Not-Detected              



             [Presence] in Respiratory                                        



             specimen by EDWARD with probe                                        



             detection (test code = 92265-6)                                    

    



SARS-CoV-2 (COVID-19) RNA [Presence] in Respiratory specimen by EDWARD with probe 
[id number] 10:06:41





             Test Item    Value        Reference Range Interpretation Comments

 

             SARS-CoV-2 (COVID-19) RNA Not detected Not-Detected              



             [Presence] in Respiratory                                        



             specimen by EDWARD with probe                                        



             detection (test code = 73190-4)                                    

    



LIPIDS2020-10-14 12:33:00





             Test Item    Value        Reference Range Interpretation Comments

 

             Chol (test code = Chol) 129                                    



[facility]





             Test Item    Value        Reference Range Interpretation Comments

 

             HDL (test code = HDL) 37                                     



[facility] 12:33[facility]





             Test Item    Value        Reference Range Interpretation Comments

 

             Trig (test code = Trig) 76                                     



[facility]:33[facility]





             Test Item    Value        Reference Range Interpretation Comments

 

             LDL (Calculated) (test code = LDL 76                               

      



             (Calculated))                                        



[facility]:33[facility]





             Test Item    Value        Reference Range Interpretation Comments

 

             CHD Risk (test code = CHD Risk) 3.5                                

    



[facility] 12:33[facility]





             Test Item    Value        Reference Range Interpretation Comments

 

             Non HDL Chol (test code = Non HDL Chol) 92                         

            



[facility] [id number] 14:47:00





             Test Item    Value        Reference Range Interpretation Comments

 

             Vitamin D, 25-OH, Total (test code = 37                      

         



             Vitamin D, 25-OH, Total)                                        



[facility] [id number] 14:47:00





             Test Item    Value        Reference Range Interpretation Comments

 

             U Creat mg/dL (test code = U Creat 114                       

       



             mg/dL)                                              



[facility]2020-08-06 14:47:00





             Test Item    Value        Reference Range Interpretation Comments

 

             U Prot/Creat (test code = U Prot/Creat) 430                  

            



[place] 8[address]66-08-06 14:47:00





             Test Item    Value        Reference Range Interpretation Comments

 

             U Prot/Creat (test code = U 0.430 1      0.021-0.161               



             Prot/Creat)                                         



[facility]2020-08-06 14:47:00





             Test Item    Value        Reference Range Interpretation Comments

 

             U Protein (test code = U Protein) 49           5-24                

      



[place] [id number] 14:47:00





             Test Item    Value        Reference Range Interpretation Comments

 

             Glucose Lvl (test code = Glucose Lvl) 103          65-99           

          



[place] [id number] 14:47:00





             Test Item    Value        Reference Range Interpretation Comments

 

             BUN (test code = BUN) 24           7-25                      



[place] [id number] 14:47:00





             Test Item    Value        Reference Range Interpretation Comments

 

             Creatinine Lvl (test code = Creatinine 1.32         0.50-0.99      

           



             Lvl)                                                



[facility]2020-08-06 14:47:00





             Test Item    Value        Reference Range Interpretation Comments

 

             eGFR NON-AFR. AMERICAN (test code = 43                             

        



             eGFR NON-AFR. AMERICAN)                                        



[facility]2020-08-06 14:47:00





             Test Item    Value        Reference Range Interpretation Comments

 

             eGFR  (test code = eGFR 50                         

            



             )                                        



[place] 879390-08-06 14:47:00





             Test Item    Value        Reference Range Interpretation Comments

 

             B/C Ratio (test code = B/C Ratio) 18           6-22                

      



[place] 879390-08-06 14:47:00





             Test Item    Value        Reference Range Interpretation Comments

 

             Sodium Lvl (test code = Sodium Lvl) 138          135-146           

        



[place] 879390-08-06 14:47:00





             Test Item    Value        Reference Range Interpretation Comments

 

             Potassium Lvl (test code = Potassium 4.4          3.5-5.3          

         



             Lvl)                                                



[place] 879390-08-06 14:47:00





             Test Item    Value        Reference Range Interpretation Comments

 

             Chloride Lvl (test code = Chloride Lvl) 102                  

            



[facility]2020-08-06 14:47:00





             Test Item    Value        Reference Range Interpretation Comments

 

             CO2 (test code = CO2) 30           20-32                     



[facility]2020-08-06 14:47:00





             Test Item    Value        Reference Range Interpretation Comments

 

             Calcium Lvl (test code = Calcium Lvl) 9.4          8.6-10.4        

          



[place] 879390-08-06 14:47:00





             Test Item    Value        Reference Range Interpretation Comments

 

             Phosphorus (test code = Phosphorus) 4.8          2.5-4.5           

        



[facility]2020-08-06 14:47:00





             Test Item    Value        Reference Range Interpretation Comments

 

             Albumin Lvl (test code = Albumin Lvl) 3.9          3.6-5.1         

          



[facility]7136[facility] 14:47:00





             Test Item    Value        Reference Range Interpretation Comments

 

             Plt Count Estimated (test code = DECREASED                         

     



             Plt Count Estimated)                                        



[facility] HospitalDrtuiqoEHBQGXXSIE3422-55-63 14:47:00





             Test Item    Value        Reference Range Interpretation Comments

 

             WBC X 10x3 (test code = WBC X 10x3) 7.2          3.8-10.8          

        



[place] 120430-08-06 14:47:00





             Test Item    Value        Reference Range Interpretation Comments

 

             RBC X 10x6 (test code = RBC X 10x6) 3.71         3.80-5.10         

        



[facility] HospitalPerixvmJWNESZRXZH6223-92-93 14:47:00





             Test Item    Value        Reference Range Interpretation Comments

 

             Hgb (test code = Hgb) 10.7         11.7-15.5                 



[facility] HospitalQufyuliHMRQPBFCDX4045-32-30 14:47:00





             Test Item    Value        Reference Range Interpretation Comments

 

             Hct (test code = Hct) 33.9         35.0-45.0                 



[place] 120430-08-06 14:47:00





             Test Item    Value        Reference Range Interpretation Comments

 

             MCV (test code = MCV) 91.4         80.0-100.0                



[place] [address]-08-06 14:47:00





             Test Item    Value        Reference Range Interpretation Comments

 

             MCH (test code = MCH) 28.8 pg      27.0-33.0                 



[place] [address]-08-06 14:47:00





             Test Item    Value        Reference Range Interpretation Comments

 

             MCHC (test code = MCHC) 31.6         32.0-36.0                 



[place] 120430-08-06 14:47:00





             Test Item    Value        Reference Range Interpretation Comments

 

             RDW (test code = RDW) 13.9         11.0-15.0                 



[facility] HospitalAmdgofbMEDDPIKDSZ0817-98-79 14:47:00





             Test Item    Value        Reference Range Interpretation Comments

 

             Platelet (test code = Platelet) 110          140-400               

    



[facility] HospitalXktakpmFMNKIWSBXZ1807-69-95 14:47:00





             Test Item    Value        Reference Range Interpretation Comments

 

             MPV (test code = MPV) 11.7         7.5-12.5                  



[facility] HospitalYznbchtZYWKHQSJLC8476-50-74 14:47:00





             Test Item    Value        Reference Range Interpretation Comments

 

             Neutrophils # (test code = Neutrophils 5414         8343-8875      

           



             #)                                                  



[facility] HospitalBbygtwzMHEWFBTZIB8479-66-61 14:47:00





             Test Item    Value        Reference Range Interpretation Comments

 

             Lymphocytes # (test code = Lymphocytes 1152         850-3900       

           



             #)                                                  



[facility] HospitalGuxjucoTICKLNAUVL3299-96-85 14:47:00





             Test Item    Value        Reference Range Interpretation Comments

 

             Monocytes # (test code = Monocytes #) 461          200-950         

          



[facility] HospitalMrhalixQNGBOHYSWV9105-34-41 14:47:00





             Test Item    Value        Reference Range Interpretation Comments

 

             Eosinophils # (test code = Eosinophils 122                   

           



             #)                                                  



[facility] HospitalYwbgcvoXAEHMOWUZG4446-98-86 14:47:00





             Test Item    Value        Reference Range Interpretation Comments

 

             Basophils # (test code 50           See_Comment                [Aut

omated message] The



             = Basophils #)                                        system which 

generated



                                                                 this result tra

nsmitted



                                                                 reference range

: <=200.



                                                                 The reference r

cheyenne was



                                                                 not used to int

erpret



                                                                 this result as



                                                                 normal/abnormal

.



[facility] HospitalWdoekyrQITJNDQQPL6091-56-83 14:47:00





             Test Item    Value        Reference Range Interpretation Comments

 

             Segs (test code = Segs) 75.2                                   



[facility] HospitalQkjfxbzSWFQFZJQKD8072-17-65 14:47:00





             Test Item    Value        Reference Range Interpretation Comments

 

             Lymphocytes (test code = Lymphocytes) 16.0                         

          



[facility] HospitalCorsklcXGVRPBBIGM8857-69-88 14:47:00





             Test Item    Value        Reference Range Interpretation Comments

 

             Monocytes (test code = Monocytes) 6.4                              

      



[place] 120430-08-06 14:47:00





             Test Item    Value        Reference Range Interpretation Comments

 

             Eosinophils (test code = Eosinophils) 1.7                          

          



[facility] HospitalKholmjpHIPYVCFIDV9417-81-79 14:47:00





             Test Item    Value        Reference Range Interpretation Comments

 

             Basophils (test code = Basophils) 0.7                              

      



[facility] [id number] 14:47:00





             Test Item    Value        Reference Range Interpretation Comments

 

             Result 2 (Urine Culture) See Result Comment                        

   



             (test code = Result 2                                        



             (Urine Culture))                                        



[facility] [id number] 14:47:00





             Test Item    Value        Reference Range Interpretation Comments

 

             UA Color (test code = UA Color) YELLOW                             

    



[facility] [id number] 14:47:00





             Test Item    Value        Reference Range Interpretation Comments

 

             UA Turbidity (test code = UA CLOUDY                                

 



             Turbidity)                                          



[facility] [id number] 14:47:00





             Test Item    Value        Reference Range Interpretation Comments

 

             UA Spec Grav (test code = UA Spec 1.017 1      1.001-1.035         

      



             Grav)                                               



[facility] [id number] 14:47:00





             Test Item    Value        Reference Range Interpretation Comments

 

             UA pH (test code = UA pH) 5.5 1        5.0-8.0                   



[facility] [id number] 14:47:00





             Test Item    Value        Reference Range Interpretation Comments

 

             UA Glucose (test code = UA Glucose) NEGATIVE                       

        



[facility] [id number] 14:47:00





             Test Item    Value        Reference Range Interpretation Comments

 

             UA Bili (test code = UA Bili) NEGATIVE                             

  



[facility] [id number] 14:47:00





             Test Item    Value        Reference Range Interpretation Comments

 

             UA Ketones (test code = UA Ketones) NEGATIVE                       

        



[facility] [id number] 14:47:00





             Test Item    Value        Reference Range Interpretation Comments

 

             UA Blood (test code = UA Blood) 1+                                 

    



[facility] [id number] 14:47:00





             Test Item    Value        Reference Range Interpretation Comments

 

             UA Protein (test code = UA Protein) 1+                             

        



[facility] [id number] 14:47:00





             Test Item    Value        Reference Range Interpretation Comments

 

             UA Nitrite (test code = UA Nitrite) POSITIVE                       

        



[facility] [id number] 14:47:00





             Test Item    Value        Reference Range Interpretation Comments

 

             UA Leuk Est (test code = UA Leuk Est) 2+                           

          



[facility] [id number] 14:47:00





             Test Item    Value        Reference Range Interpretation Comments

 

             UA WBC (test code = UA WBC) > OR = 60                              



[facility] [id number] 14:47:00





             Test Item    Value        Reference Range Interpretation Comments

 

             UA RBC (test code = UA RBC) 0-2                                    



[facility] [id number] 14:47:00





             Test Item    Value        Reference Range Interpretation Comments

 

             UA Sq Epi (test code = UA Sq Epi) NONE SEEN                        

      



[facility] [id number] 14:47:00





             Test Item    Value        Reference Range Interpretation Comments

 

             UA Bacteria (test code = UA Bacteria) MANY                         

          



[facility] [id number] 14:47:00





             Test Item    Value        Reference Range Interpretation Comments

 

             UA Hyal Cast (test code = UA Hyal NONE SEEN                        

      



             Cast)                                               



[facility] [id number] 14:47:00





             Test Item    Value        Reference Range Interpretation Comments

 

             UA Reflex (test code CULTURE INDICATED -                           



             = UA Reflex) RESULTS TO FOLLOW                           



[facility] [id number] 14:47:00





             Test Item    Value        Reference Range Interpretation Comments

 

             U Creat mg/dL (test code = U Creat 114                       

       



             mg/dL)                                              



[facility] [id number] 14:47:00





             Test Item    Value        Reference Range Interpretation Comments

 

             U Alb (test code = U Alb) 16.7                                   



[facility] [id number] 14:47:00





             Test Item    Value        Reference Range Interpretation Comments

 

             U Alb/Crea (test code = U Alb/Crea) 146                            

        



[facility] PROTEIN ELECTROPHORESIS-24HR [id number] 08:01:00





             Test Item    Value        Reference Range Interpretation Comments

 

             CREATININE, 24 HOUR 1.45 g/24 h  0.50-2.15                 



             URINE (test code =                                        



             97438736)                                           

 

             PROTEIN/CREATININE 292 mg/g creat < OR = 114   H            



             RATION (test code =                                        



             83245448)                                           

 

             PROTEIN, TOTAL 24 HR  mg/24 h  <150         H            TEST

 PERFORMED



             (test code = 79245614)                                        AT:

EST



                                                                 [facility]

IN



                                                                 [id number] [place], [place] [address], [place]



                                                                 [id number] FELIX MD

 

             ALBUMIN (test code = 35 %                                   



             88885924)                                           

 

             ALPHA-1-GLOBULINS (test 10 %                                   



             code = 56132364)                                        

 

             ALPHA-2-GLOBULINS (test 12 %                                   



             code = 03693680)                                        

 

             BETA GLOBULINS (test 25 %                                   



             code = 72973459)                                        

 

             GAMMA GLOBULINS (test 18 %                                   



             code = 85723526)                                        

 

             INTERPRETATION (test                                        Albumin

 and



             code = 01168926)                                        various aba

bulin



                                                                 fractions



                                                                 detected on



                                                                 proteinelectrop

ho



                                                                 resis. No



                                                                 abnormal protei

n



                                                                 bands



                                                                 (Bence-Jonespro

te



                                                                 inuria)



                                                                 detected.TEST



                                                                 PERFORMED



                                                                 AT:[facility]



                                                                 [facility]

IN



                                                                 [address]



                                                                 [id number] FELIX MD



NEUTROPHIL CYTOPLASMIC GW--25-21 05:19:00





             Test Item    Value        Reference Range Interpretation Comments

 

             ANCA SCREEN (test NEGATIVE     NEGATIVE                  ANCA Scree

n includes



             code = 44627294)                                        evaluation 

for p-ANCA,



                                                                 c-ANCA andatypi

tayla p-ANCA.



                                                                 A positive ANCA

 screen



                                                                 reflexes to tit

erand



                                                                 pattern(s), e.g

.,



                                                                 cytoplasmic pat

tern



                                                                 (c-ANCA),perinu

clear



                                                                 pattern (p-ANCA

), or



                                                                 atypical p-ANCA



                                                                 pattern.c-ANCA 

and p-ANCA



                                                                 are observed in

 vasculitis,



                                                                 whereasatypical

 p-ANCA is



                                                                 observed in IBD



                                                                 (Inflammatory



                                                                 BowelDisease). 

Atypical



                                                                 p-ANCA is detec

kolby in about



                                                                 55% to 80% ofpa

tients with



                                                                 ulcerative coli

tis but only



                                                                 5% to 25% ofpat

ients with



                                                                 Crohn's disease

.TEST



                                                                 PERFORMED AT:[facility]

[employer]



                                                                 [facility]



                                                                 [id number], [place]



                                                                 [id number] MARIEE MD,PHD

,RAMILA



COMPREHENSIVE METABOLIC [id number] 06:25:00





             Test Item    Value        Reference Range Interpretation Comments

 

             GLUCOSE (test code = 06D) 115 mg/dL           H            

 

             SODIUM (test code = 01A) 137 mmol/L   136-145                   

 

             POTASSIUM (test code = 01B) 3.3 mmol/L   3.6-5.1      L            

 

             CHLORIDE (test code = 04A) 97 mmol/L           L            

 

             CO2 (test code = 02A) 32 mmol/L    22-32                     

 

             ANION GAP (test code = ANG) 11.3 mmol/L                            

 

             BUN (test code = 05D) 36 mg/dL     7-18         H            

 

             CREATININE (test code = 03E) 1.4 mg/dL    0.4-1.1      H           

 

 

             BUN/CREA (test code = BCR) 25           12-20        H            

 

             CALCIUM (test code = 09D) 8.8 mg/dL    8.3-9.5                   

 

             BILI TOTAL (test code = 11A) 1.2 mg/dL    0.2-1.0      H           

 

 

             PROTEIN (test code = 07D) 6.5 g/dL     6.4-8.2                   

 

             ALBUMIN (test code = 08D) 2.9 g/dL     3.5-4.8      L            

 

             GLOBULIN (test code = GLB) 3.6 g/dL     1.5-3.8                   

 

             ALB/GLOB (test code = AGRR) 0.8          1.0-2.6      L            

 

             ALK PHOS (test code = 35A) 97 IU/L                          

 

             AST (test code = 30A) 15 IU/L      <=42                      

 

             ALT (test code = 31A) 35 IU/L      <=78                      



CBC (INCLUDES AUTOMATED DIFFERENTIAL)2019 06:06:00





             Test Item    Value        Reference Range Interpretation Comments

 

             WBC (test code = WBC) 6.9 10\S\3/uL 4.5-11.0                  

 

             RBC (test code = RBC) 3.56 10\S\6/uL 4.20-5.60    L            

 

             HGB (test code = HBG) 10.4 g/dL    12.0-15.5    L            

 

             HCT (test code = HCT) 32.1 %       35.0-44.0    L            

 

             MCV (test code = MCV) 90.2 fL      81.0-99.0                 

 

             MCH (test code = MCH) 29.2 pg      27.0-31.0                 

 

             MCHC (test code = MCHC) 32.4 g/dL    32.0-36.0                 

 

             RDW (test code = RDW) 15.0 %       11.5-14.5    H            

 

             PLT (test code = PLT) 158 10\S\3/uL 130-400                   

 

             MPV (test code = MPV) 10.7 fL      9.4-12.4                  

 

             NEUTROP # (test code = NE#) 4.4 10\S\3/uL 1.6-8.0                  

 

 

             LYMPH # (test code = LY#) 1.8 10\S\3/uL 1.1-3.5                   

 

             MONOCYTE # (test code = MO#) 0.5 10\S\3/uL 0.0-1.1                 

  

 

             EOSINOPH # (test code = EO#) 0.2 10\S\3/uL 0.0-0.7                 

  

 

             BASOPHIL # (test code = BA#) 0.0 10\S\3/uL 0.0-0.3                 

  

 

             IG # (test code = IG#) 0.03 10\S\3/uL 0.00-0.06                 

 

             NRBC # (test code = NRBC#) 0.00 10\S\3/uL 0.00-0.01                

 

 

             NEUTROPH % (test code = NE%) 64.0 %       35.0-73.0                

 

 

             LYMPH % (test code = LY%) 26.1 %       20.0-55.0                 

 

             MONO % (test code = MO%) 6.5 %        2.5-10.0                  

 

             EOSINOPH % (test code = EO%) 2.6 %        0.0-5.0                  

 

 

             BASOPHIL % (test code = BA%) 0.4 %        0.0-2.0                  

 

 

             IG % (test code = IG%) 0.4 %        0.0-0.8                   

 

             NRBC% (test code = NRBC%) 0.0 %        0.0-0.2                   

 

             MANDIFF (test code = MDIFF) NO           NO                        

 

             RBC MORPH (test code = RBCMOR)              NORMAL                 

   



URINE [id number] 09:53:00





             Test Item    Value        Reference Range Interpretation Comments

 

             Isolate 1 (test code = Proteus mirabilis              A            



             ISO1)                                               

 

             ampicillin (test code = am)  ug/mL                    S            

 

             ampicillin/sulbactam (test  ug/mL                    S            



             code = ams)                                         

 

             piperacillin/tazobactam  ug/mL                    S            



             (test code = tzp)                                        

 

             ceftazidime (test code =  ug/mL                    S            



             jeannine)                                                

 

             ceftriaxone1 (test code =  ug/mL                    S            



             ctr)                                                

 

             cefepime (test code = fep)  ug/mL                    S            

 

             aztreonam (test code = azm)  ug/mL                    S            

 

             ertapenem (test code = etp)  ug/mL                    S            

 

             meropenem (test code = mem)  ug/mL                    S            

 

             gentamicin (test code = gm)  ug/mL                    S            

 

             tobramycin (test code =  ug/mL                    S            



             tob)                                                

 

             levofloxacin (test code =  ug/mL                    S            



             lev)                                                

 

             trimethoprim/sulfamethoxazo  ug/mL                    S            



             le (test code = sxt)                                        



PARTHYROID HORMONE (INTACT)2019 08:24:00





             Test Item    Value        Reference Range Interpretation Comments

 

             PTH INTACT (test code 166.6 pg/mL  18.4-80.1    H            



             = A85)                                              

 

             Ref Range Change ***** Please note the                           



             (test code = REF change in reference                           



             RANGE)       range ***                              



VITAMIN D TOTAL 25 (OH)2019 07:15:00





             Test Item    Value        Reference Range Interpretation Comments

 

             VITAMIN D 25(OH) (test code = VD) 36.0 ng/mL   30.0-100.0          

      



SERUM PROTEIN [id number] 06:20:00





             Test Item    Value        Reference Range Interpretation Comments

 

             PROTEIN TOTAL (test 5.7 g/dL     6.1-8.1      L            TEST PER

FORMED AT:[facility]



             code = 16275457)                                        [facility]AGNO[facility]

[id number]



                                                                 [facility]

[place], [place]



                                                                 [id number] FELIX MD

 

             ALBUMIN (test code = 3.2 g/dL     3.8-4.8      L            



             [id number])                                           

 

             ALPHA-1-GLOBULINS 0.4 g/dL     0.2-0.3      H            



             (test code =                                        



             28425795)                                           

 

             ALPHA-2-GLOBULINS 0.8 g/dL     0.5-0.9                   



             (test code =                                        



             34976948)                                           

 

             BETA 1 GLOBULIN (test 0.5 g/dL     0.4-0.6                   



             code = [id number])                                        

 

             BETA 2 GLOBULIN (test 0.2 g/dL     0.2-0.5                   



             code = 51203799)                                        

 

             GAMMA GLOBULINS (test 0.6 g/dL     0.8-1.7      L            



             code = 05686143)                                        

 

             INTERPRETATION (test                                        Pattern

 consistent with



             code = [id number])                                        an acute ph

ase



                                                                 reactionConsist

ent with



                                                                 hypogammaglobul

inemia.



                                                                 Serum free ligh

tchains



                                                                 or urine immuno

fixation



                                                                 should be consi

dered



                                                                 ifplasma cell d

yscrasias



                                                                 are a possible



                                                                 clinicaldiagnos

is.TEST



                                                                 PERFORMED AT:[facility]

[facility]



                                                                 [facility]

[id number]



                                                                 [facility]

[place], [place]



                                                                 [id number] FELIX MD



[id number] 06:13:00





             Test Item    Value        Reference Range Interpretation Comments

 

             MAGNESIUM (test code = 48A) 1.7 mg/dL    1.8-2.4      L            



COMPREHENSIVE METABOLIC [id number] 06:13:00





             Test Item    Value        Reference Range Interpretation Comments

 

             GLUCOSE (test code = 06D) 110 mg/dL           H            

 

             SODIUM (test code = 01A) 140 mmol/L   136-145                   

 

             POTASSIUM (test code = 01B) 3.1 mmol/L   3.6-5.1      L            

 

             CHLORIDE (test code = 04A) 96 mmol/L           L            

 

             CO2 (test code = 02A) 34 mmol/L    22-32        H            

 

             ANION GAP (test code = ANG) 13.1 mmol/L                            

 

             BUN (test code = 05D) 33 mg/dL     7-18         H            

 

             CREATININE (test code = 03E) 1.5 mg/dL    0.4-1.1      H           

 

 

             BUN/CREA (test code = BCR) 22           12-20        H            

 

             CALCIUM (test code = 09D) 9.1 mg/dL    8.3-9.5                   

 

             BILI TOTAL (test code = 11A) 1.4 mg/dL    0.2-1.0      H           

 

 

             PROTEIN (test code = 07D) 7.0 g/dL     6.4-8.2                   

 

             ALBUMIN (test code = 08D) 3.2 g/dL     3.5-4.8      L            

 

             GLOBULIN (test code = GLB) 3.8 g/dL     1.5-3.8                   

 

             ALB/GLOB (test code = AGRR) 0.8          1.0-2.6      L            

 

             ALK PHOS (test code = 35A) 111 IU/L                         

 

             AST (test code = 30A) 18 IU/L      <=42                      

 

             ALT (test code = 31A) 43 IU/L      <=78                      



PHOSPHORUS (P04)2019 06:13:00





             Test Item    Value        Reference Range Interpretation Comments

 

             PHOSPHORUS (test code = 43D) 4.0 mg/dL    2.7-4.6                  

 



CBC (INCLUDES AUTOMATED DIFFERENTIAL)2019 05:48:00





             Test Item    Value        Reference Range Interpretation Comments

 

             WBC (test code = WBC) 9.4 10\S\3/uL 4.5-11.0                  

 

             RBC (test code = RBC) 3.73 10\S\6/uL 4.20-5.60    L            

 

             HGB (test code = HBG) 10.8 g/dL    12.0-15.5    L            

 

             HCT (test code = HCT) 33.8 %       35.0-44.0    L            

 

             MCV (test code = MCV) 90.6 fL      81.0-99.0                 

 

             MCH (test code = MCH) 29.0 pg      27.0-31.0                 

 

             MCHC (test code = MCHC) 32.0 g/dL    32.0-36.0                 

 

             RDW (test code = RDW) 15.1 %       11.5-14.5    H            

 

             PLT (test code = PLT) 189 10\S\3/uL 130-400                   

 

             MPV (test code = MPV) 11.8 fL      9.4-12.4                  

 

             NEUTROP # (test code = NE#) 7.0 10\S\3/uL 1.6-8.0                  

 

 

             LYMPH # (test code = LY#) 1.6 10\S\3/uL 1.1-3.5                   

 

             MONOCYTE # (test code = MO#) 0.7 10\S\3/uL 0.0-1.1                 

  

 

             EOSINOPH # (test code = EO#) 0.1 10\S\3/uL 0.0-0.7                 

  

 

             BASOPHIL # (test code = BA#) 0.0 10\S\3/uL 0.0-0.3                 

  

 

             IG # (test code = IG#) 0.06 10\S\3/uL 0.00-0.06                 

 

             NRBC # (test code = NRBC#) 0.00 10\S\3/uL 0.00-0.01                

 

 

             NEUTROPH % (test code = NE%) 74.5 %       35.0-73.0    H           

 

 

             LYMPH % (test code = LY%) 16.5 %       20.0-55.0    L            

 

             MONO % (test code = MO%) 7.0 %        2.5-10.0                  

 

             EOSINOPH % (test code = EO%) 1.1 %        0.0-5.0                  

 

 

             BASOPHIL % (test code = BA%) 0.3 %        0.0-2.0                  

 

 

             IG % (test code = IG%) 0.6 %        0.0-0.8                   

 

             NRBC% (test code = NRBC%) 0.0 %        0.0-0.2                   

 

             MANDIFF (test code = MDIFF) NO           NO                        

 

             RBC MORPH (test code = RBCMOR)              NORMAL                 

   



COMPREHENSIVE METABOLIC [id number] 05:30:00





             Test Item    Value        Reference Range Interpretation Comments

 

             GLUCOSE (test code = 06D) 122 mg/dL           H            

 

             SODIUM (test code = 01A) 140 mmol/L   136-145                   

 

             POTASSIUM (test code = 01B) 3.8 mmol/L   3.6-5.1                   

 

             CHLORIDE (test code = 04A) 100 mmol/L                       

 

             CO2 (test code = 02A) 32 mmol/L    22-32                     

 

             ANION GAP (test code = ANG) 11.8 mmol/L                            

 

             BUN (test code = 05D) 29 mg/dL     7-18         H            

 

             CREATININE (test code = 03E) 1.5 mg/dL    0.4-1.1      H           

 

 

             BUN/CREA (test code = BCR) 20           12-20                     

 

             CALCIUM (test code = 09D) 9.0 mg/dL    8.3-9.5                   

 

             BILI TOTAL (test code = 11A) 1.3 mg/dL    0.2-1.0      H           

 

 

             PROTEIN (test code = 07D) 7.1 g/dL     6.4-8.2                   

 

             ALBUMIN (test code = 08D) 3.5 g/dL     3.5-4.8                   

 

             GLOBULIN (test code = GLB) 3.6 g/dL     1.5-3.8                   

 

             ALB/GLOB (test code = AGRR) 1.0          1.0-2.6                   

 

             ALK PHOS (test code = 35A) 119 IU/L                         

 

             AST (test code = 30A) 22 IU/L      <=42                      

 

             ALT (test code = 31A) 49 IU/L      <=78                      



CBC (INCLUDES AUTOMATED DIFFERENTIAL)2019 05:18:00





             Test Item    Value        Reference Range Interpretation Comments

 

             WBC (test code = WBC) 9.0 10\S\3/uL 4.5-11.0                  

 

             RBC (test code = RBC) 3.94 10\S\6/uL 4.20-5.60    L            

 

             HGB (test code = HBG) 11.4 g/dL    12.0-15.5    L            

 

             HCT (test code = HCT) 35.8 %       35.0-44.0                 

 

             MCV (test code = MCV) 90.9 fL      81.0-99.0                 

 

             MCH (test code = MCH) 28.9 pg      27.0-31.0                 

 

             MCHC (test code = MCHC) 31.8 g/dL    32.0-36.0    L            

 

             RDW (test code = RDW) 14.8 %       11.5-14.5    H            

 

             PLT (test code = PLT) 164 10\S\3/uL 130-400                   

 

             MPV (test code = MPV) 11.6 fL      9.4-12.4                  

 

             NEUTROP # (test code = NE#) 6.8 10\S\3/uL 1.6-8.0                  

 

 

             LYMPH # (test code = LY#) 1.4 10\S\3/uL 1.1-3.5                   

 

             MONOCYTE # (test code = MO#) 0.6 10\S\3/uL 0.0-1.1                 

  

 

             EOSINOPH # (test code = EO#) 0.1 10\S\3/uL 0.0-0.7                 

  

 

             BASOPHIL # (test code = BA#) 0.0 10\S\3/uL 0.0-0.3                 

  

 

             IG # (test code = IG#) 0.06 10\S\3/uL 0.00-0.06                 

 

             NRBC # (test code = NRBC#) 0.00 10\S\3/uL 0.00-0.01                

 

 

             NEUTROPH % (test code = NE%) 75.6 %       35.0-73.0    H           

 

 

             LYMPH % (test code = LY%) 15.9 %       20.0-55.0    L            

 

             MONO % (test code = MO%) 6.5 %        2.5-10.0                  

 

             EOSINOPH % (test code = EO%) 0.9 %        0.0-5.0                  

 

 

             BASOPHIL % (test code = BA%) 0.4 %        0.0-2.0                  

 

 

             IG % (test code = IG%) 0.7 %        0.0-0.8                   

 

             NRBC% (test code = NRBC%) 0.0 %        0.0-0.2                   

 

             MANDIFF (test code = MDIFF) NO           NO                        

 

             RBC MORPH (test code = RBCMOR)              NORMAL                 

   



URINALYSIS WITH [id number] 06:44:00





             Test Item    Value        Reference Range Interpretation Comments

 

             COLOR (test code = COLU) YELLOW       YELLOW                    

 

             CLARITY (test code = CLA) CLOUDY       CLEAR        A            

 

             GLUCOSE UR (test code = UA NEGATIVE     NEGATIVE                  



             GLUCOSE)                                            

 

             BILI UR (test code = BILE) NEGATIVE     NEGATIVE                  

 

             KETONES UR (test code = ACE) NEGATIVE     NEGATIVE                 

 

 

             SP GRAVITY (test code = SPGR) 1.014        1.005-1.030             

  

 

             PH UR (test code = PH) 6.0          4.5-8.0                   

 

             PROTEIN UR (test code = PU) TRACE        NEGATIVE     A            

 

             UROBIL UR (test code = UROQ) 0.2 EU/dL    0.2-1.0                  

 

 

             NITRITE UR (test code = NEGATIVE     NEGATIVE                  



             NITRITE)                                            

 

             BLOOD UR (test code = UA BLOOD) TRACE        NEGATIVE     A        

    

 

             LEUK ES UR (test code = LEUK) 2+           NEGATIVE     A          

  

 

             WBC UR (test code = UWBC) 12 /HPF      0-5          H            

 

             RBC UR (test code = URBC) 1 /HPF       0-2                       

 

             EPITH  UR (test code = UEPC) FEW /LPF     FEW                      

 

 

             BACTERIA UR (test code = UBACT) MODERATE /HPF NONE         A       

     

 

             CAST UR (test code = CAST)  /LPF        NONE                      

 

             CRYSTAL UR (test code = CRYU)  / LPF       NONE                    

  

 

             MUCUS UR (test code = MUC)  / HPF       NONE                      

 

             AMORPH UR (test code = YASMEEN)  / HPF       NONE                     

 

 

             TRICH UR (test code = UTRICH)  /HPF        NONE                    

  

 

             YEAST UR (test code = UY)  /HPF        NONE                      

 

             SPERM UR (test code = USPERM)  /HPF        NONE                    

  



COMPREHENSIVE METABOLIC [id number] 05:24:00





             Test Item    Value        Reference Range Interpretation Comments

 

             GLUCOSE (test code = 06D) 105 mg/dL           H            

 

             SODIUM (test code = 01A) 138 mmol/L   136-145                   

 

             POTASSIUM (test code = 01B) 4.0 mmol/L   3.6-5.1                   

 

             CHLORIDE (test code = 04A) 104 mmol/L                       

 

             CO2 (test code = 02A) 25 mmol/L    22-32                     

 

             ANION GAP (test code = ANG) 13.0 mmol/L                            

 

             BUN (test code = 05D) 29 mg/dL     7-18         H            

 

             CREATININE (test code = 03E) 1.5 mg/dL    0.4-1.1      H           

 

 

             BUN/CREA (test code = BCR) 19           12-20                     

 

             CALCIUM (test code = 09D) 8.4 mg/dL    8.3-9.5                   

 

             BILI TOTAL (test code = 11A) 0.7 mg/dL    0.2-1.0                  

 

 

             PROTEIN (test code = 07D) 6.2 g/dL     6.4-8.2      L            

 

             ALBUMIN (test code = 08D) 3.1 g/dL     3.5-4.8      L            

 

             GLOBULIN (test code = GLB) 3.1 g/dL     1.5-3.8                   

 

             ALB/GLOB (test code = AGRR) 1.0          1.0-2.6                   

 

             ALK PHOS (test code = 35A) 104 IU/L                         

 

             AST (test code = 30A) 22 IU/L      <=42                      

 

             ALT (test code = 31A) 42 IU/L      <=78                      



CBC (INCLUDES AUTOMATED DIFFERENTIAL)2019 05:07:00





             Test Item    Value        Reference Range Interpretation Comments

 

             WBC (test code = WBC) 8.6 10\S\3/uL 4.5-11.0                  

 

             RBC (test code = RBC) 3.72 10\S\6/uL 4.20-5.60    L            

 

             HGB (test code = HBG) 10.8 g/dL    12.0-15.5    L            

 

             HCT (test code = HCT) 33.7 %       35.0-44.0    L            

 

             MCV (test code = MCV) 90.6 fL      81.0-99.0                 

 

             MCH (test code = MCH) 29.0 pg      27.0-31.0                 

 

             MCHC (test code = MCHC) 32.0 g/dL    32.0-36.0                 

 

             RDW (test code = RDW) 14.7 %       11.5-14.5    H            

 

             PLT (test code = PLT) 152 10\S\3/uL 130-400                   

 

             MPV (test code = MPV) 11.4 fL      9.4-12.4                  

 

             NEUTROP # (test code = NE#) 6.2 10\S\3/uL 1.6-8.0                  

 

 

             LYMPH # (test code = LY#) 1.6 10\S\3/uL 1.1-3.5                   

 

             MONOCYTE # (test code = MO#) 0.5 10\S\3/uL 0.0-1.1                 

  

 

             EOSINOPH # (test code = EO#) 0.2 10\S\3/uL 0.0-0.7                 

  

 

             BASOPHIL # (test code = BA#) 0.1 10\S\3/uL 0.0-0.3                 

  

 

             IG # (test code = IG#) 0.03 10\S\3/uL 0.00-0.06                 

 

             NRBC # (test code = NRBC#) 0.00 10\S\3/uL 0.00-0.01                

 

 

             NEUTROPH % (test code = NE%) 72.8 %       35.0-73.0                

 

 

             LYMPH % (test code = LY%) 18.5 %       20.0-55.0    L            

 

             MONO % (test code = MO%) 5.8 %        2.5-10.0                  

 

             EOSINOPH % (test code = EO%) 2.0 %        0.0-5.0                  

 

 

             BASOPHIL % (test code = BA%) 0.6 %        0.0-2.0                  

 

 

             IG % (test code = IG%) 0.3 %        0.0-0.8                   

 

             NRBC% (test code = NRBC%) 0.0 %        0.0-0.2                   

 

             MANDIFF (test code = MDIFF) NO           NO                        

 

             RBC MORPH (test code = RBCMOR)              NORMAL                 

   



U/S KIDNEY (RENAL)2019 23:20:42LOCATION: [id number]: 63-year-old female 
who presents with acute nontraumatic kidney injury.COMMENT:Sonographic imaging 
of this patient's retroperitoneum was performed.The right kidney measures 9.9 x 
5.9 x 6.4 cm with a 13 mm cortical thickness. Acyst is seen in the upper pole 
measuring 23 x 19 x 19 mm, and a second cyst isseen in the lower pole measuring 
18 x 16 x 16 mm.The left kidney measures 9.4 x 5.3 x 5.6 cm with a 11 mm 
cortical thickness. Nocysts or masses are seen in the left kidney.Cortical 
echotexture of the kidneys otherwise is unremarkable.There is no evidence of 
hydronephrosis of either kidney.In the urinary bladder only the left urine jet 
was observed on the color flowstudy. The bladder otherwise is 
unremarkable.IMPRESSION:Cystic changes are seen in the upper pole and lower pole
ofthis patient'sright kidney. No gross abnormalities are seen elsewhere in 
either kidney.The urinary bladder is unremarkable. Only the left urine jet was 
observed onthe color flow study.TROPONIN -31-30 07:14:00





             Test Item    Value        Reference Range Interpretation Comments

 

             TROPONIN I (test code = A84) <0.015 ng/mL 0.000-0.045              

 



COMPREHENSIVE METABOLIC [id number] 05:28:00





             Test Item    Value        Reference Range Interpretation Comments

 

             GLUCOSE (test code = 06D) 110 mg/dL           H            

 

             SODIUM (test code = 01A) 138 mmol/L   136-145                   

 

             POTASSIUM (test code = 01B) 3.9 mmol/L   3.6-5.1                   

 

             CHLORIDE (test code = 04A) 106 mmol/L                       

 

             CO2 (test code = 02A) 24 mmol/L    22-32                     

 

             ANION GAP (test code = ANG) 11.9 mmol/L                            

 

             BUN (test code = 05D) 22 mg/dL     7-18         H            

 

             CREATININE (test code = 03E) 1.5 mg/dL    0.4-1.1      H           

 

 

             BUN/CREA (test code = BCR) 15           12-20                     

 

             CALCIUM (test code = 09D) 8.2 mg/dL    8.3-9.5      L            

 

             BILI TOTAL (test code = 11A) 0.6 mg/dL    0.2-1.0                  

 

 

             PROTEIN (test code = 07D) 6.0 g/dL     6.4-8.2      L            

 

             ALBUMIN (test code = 08D) 2.9 g/dL     3.5-4.8      L            

 

             GLOBULIN (test code = GLB) 3.1 g/dL     1.5-3.8                   

 

             ALB/GLOB (test code = AGRR) 0.9          1.0-2.6      L            

 

             ALK PHOS (test code = 35A) 96 IU/L                          

 

             AST (test code = 30A) 19 IU/L      <=42                      

 

             ALT (test code = 31A) 35 IU/L      <=78                      



CBC (INCLUDES AUTOMATED DIFFERENTIAL)2019 05:14:00





             Test Item    Value        Reference Range Interpretation Comments

 

             WBC (test code = WBC) 7.0 10\S\3/uL 4.5-11.0                  

 

             RBC (test code = RBC) 3.64 10\S\6/uL 4.20-5.60    L            

 

             HGB (test code = HBG) 10.6 g/dL    12.0-15.5    L            

 

             HCT (test code = HCT) 33.5 %       35.0-44.0    L            

 

             MCV (test code = MCV) 92.0 fL      81.0-99.0                 

 

             MCH (test code = MCH) 29.1 pg      27.0-31.0                 

 

             MCHC (test code = MCHC) 31.6 g/dL    32.0-36.0    L            

 

             RDW (test code = RDW) 14.9 %       11.5-14.5    H            

 

             PLT (test code = PLT) 145 10\S\3/uL 130-400                   

 

             MPV (test code = MPV) 11.4 fL      9.4-12.4                  

 

             NEUTROP # (test code = NE#) 4.2 10\S\3/uL 1.6-8.0                  

 

 

             LYMPH # (test code = LY#) 2.1 10\S\3/uL 1.1-3.5                   

 

             MONOCYTE # (test code = MO#) 0.5 10\S\3/uL 0.0-1.1                 

  

 

             EOSINOPH # (test code = EO#) 0.1 10\S\3/uL 0.0-0.7                 

  

 

             BASOPHIL # (test code = BA#) 0.0 10\S\3/uL 0.0-0.3                 

  

 

             IG # (test code = IG#) 0.04 10\S\3/uL 0.00-0.06                 

 

             NRBC # (test code = NRBC#) 0.00 10\S\3/uL 0.00-0.01                

 

 

             NEUTROPH % (test code = NE%) 59.7 %       35.0-73.0                

 

 

             LYMPH % (test code = LY%) 30.1 %       20.0-55.0                 

 

             MONO % (test code = MO%) 7.0 %        2.5-10.0                  

 

             EOSINOPH % (test code = EO%) 2.0 %        0.0-5.0                  

 

 

             BASOPHIL % (test code = BA%) 0.6 %        0.0-2.0                  

 

 

             IG % (test code = IG%) 0.6 %        0.0-0.8                   

 

             NRBC% (test code = NRBC%) 0.0 %        0.0-0.2                   

 

             MANDIFF (test code = MDIFF) NO           NO                        

 

             RBC MORPH (test code = RBCMOR)              NORMAL                 

   



COMPREHENSIVE METABOLIC PAN2019-08-15 04:58:00





             Test Item    Value        Reference Range Interpretation Comments

 

             GLUCOSE (test code = 06D) 112 mg/dL           H            

 

             SODIUM (test code = 01A) 143 mmol/L   136-145                   

 

             POTASSIUM (test code = 01B) 4.4 mmol/L   3.6-5.1                   

 

             CHLORIDE (test code = 04A) 108 mmol/L          H            

 

             CO2 (test code = 02A) 27 mmol/L    22-32                     

 

             ANION GAP (test code = ANG) 12.4 mmol/L                            

 

             BUN (test code = 05D) 19 mg/dL     7-18         H            

 

             CREATININE (test code = 03E) 1.4 mg/dL    0.4-1.1      H           

 

 

             BUN/CREA (test code = BCR) 14           12-20                     

 

             CALCIUM (test code = 09D) 8.5 mg/dL    8.3-9.5                   

 

             BILI TOTAL (test code = 11A) 0.9 mg/dL    0.2-1.0                  

 

 

             PROTEIN (test code = 07D) 6.2 g/dL     6.4-8.2      L            

 

             ALBUMIN (test code = 08D) 2.9 g/dL     3.5-4.8      L            

 

             GLOBULIN (test code = GLB) 3.3 g/dL     1.5-3.8                   

 

             ALB/GLOB (test code = AGRR) 0.9          1.0-2.6      L            

 

             ALK PHOS (test code = 35A) 95 IU/L                          

 

             AST (test code = 30A) 19 IU/L      <=42                      

 

             ALT (test code = 31A) 40 IU/L      <=78                      



CBC (INCLUDES AUTOMATED DIFFERENTIAL)2019-08-15 04:51:00





             Test Item    Value        Reference Range Interpretation Comments

 

             WBC (test code = WBC) 8.2 10\S\3/uL 4.5-11.0                  

 

             RBC (test code = RBC) 3.48 10\S\6/uL 4.20-5.60    L            

 

             HGB (test code = HBG) 10.3 g/dL    12.0-15.5    L            

 

             HCT (test code = HCT) 32.4 %       35.0-44.0    L            

 

             MCV (test code = MCV) 93.1 fL      81.0-99.0                 

 

             MCH (test code = MCH) 29.6 pg      27.0-31.0                 

 

             MCHC (test code = MCHC) 31.8 g/dL    32.0-36.0    L            

 

             RDW (test code = RDW) 15.5 %       11.5-14.5    H            

 

             PLT (test code = PLT) 163 10\S\3/uL 130-400                   

 

             MPV (test code = MPV) 11.8 fL      9.4-12.4                  

 

             NEUTROP # (test code = NE#) 5.3 10\S\3/uL 1.6-8.0                  

 

 

             LYMPH # (test code = LY#) 2.0 10\S\3/uL 1.1-3.5                   

 

             MONOCYTE # (test code = MO#) 0.6 10\S\3/uL 0.0-1.1                 

  

 

             EOSINOPH # (test code = EO#) 0.2 10\S\3/uL 0.0-0.7                 

  

 

             BASOPHIL # (test code = BA#) 0.0 10\S\3/uL 0.0-0.3                 

  

 

             IG # (test code = IG#) 0.03 10\S\3/uL 0.00-0.06                 

 

             NRBC # (test code = NRBC#) 0.00 10\S\3/uL 0.00-0.01                

 

 

             NEUTROPH % (test code = NE%) 65.5 %       35.0-73.0                

 

 

             LYMPH % (test code = LY%) 24.2 %       20.0-55.0                 

 

             MONO % (test code = MO%) 7.2 %        2.5-10.0                  

 

             EOSINOPH % (test code = EO%) 2.2 %        0.0-5.0                  

 

 

             BASOPHIL % (test code = BA%) 0.5 %        0.0-2.0                  

 

 

             IG % (test code = IG%) 0.4 %        0.0-0.8                   

 

             NRBC% (test code = NRBC%) 0.0 %        0.0-0.2                   

 

             MANDIFF (test code = MDIFF) NO           NO                        



CARDIAC [id number] 23:10:00





             Test Item    Value        Reference Range Interpretation Comments

 

             TROPONIN I (test code = A84) <0.015 ng/mL 0.000-0.045              

 



U/S VENOUS DOPPLER IVON LOW [id number] 22:23:14LOCATION: [id number]: 
63-year-old female who presents with bilateral leg swelling.COMMENT:    Sonogra
[facility] imaging of the venous anatomy in both of this patient's legs wasobtained 
utilizing grayscale, color-flow, and Doppler waveform imagingmodalities.The 
common femoral veins, superficial femoral veins, popliteal veins, 
posteriortibial veins, anterior tibial veins, and peroneal veins in both legs 
wereincluded in the study.The anatomy exhibits normal compressibility on 
grayscale study. No suspiciousfilling defects are seen on the color flow study. 
Appropriate waveform responseas are noted on the Doppler study during 
augmentation maneuvers and duringquiet respiration.    IMPRESSION:There is no 
sonographic evidence of venous thrombosis in either of thispatient's legs.
CARDIAC [id number] 16:50:00





             Test Item    Value        Reference Range Interpretation Comments

 

             TROPONIN I (test code = A84) <0.015 ng/mL 0.000-0.045              

 



BRAIN NATRIURETIC [id number] 14:39:00





             Test Item    Value        Reference Range Interpretation Comments

 

             proBNP (test code = PBNP) 1337 pg/mL   0-125        H            



THYROID PANEL/SCREEN (TSH)2019 12:05:00





             Test Item    Value        Reference Range Interpretation Comments

 

             TSH (test code = A57) 0.989 uIU/mL 0.358-3.740               



[id number] 12:02:00





             Test Item    Value        Reference Range Interpretation Comments

 

             CPK (test code = 32A) 98 IU/L                          



AMYLASE AND [id number] 12:02:00





             Test Item    Value        Reference Range Interpretation Comments

 

             AMYLASE (test code = 10A) 19 U/L              L            

 

             LIPASE (test code = 60A) 67 IU/L             L            



COMPREHENSIVE METABOLIC [id number] 12:02:00





             Test Item    Value        Reference Range Interpretation Comments

 

             GLUCOSE (test code = 06D) 109 mg/dL           H            

 

             SODIUM (test code = 01A) 142 mmol/L   136-145                   

 

             POTASSIUM (test code = 01B) 4.2 mmol/L   3.6-5.1                   

 

             CHLORIDE (test code = 04A) 109 mmol/L          H            

 

             CO2 (test code = 02A) 26 mmol/L    22-32                     

 

             ANION GAP (test code = ANG) 11.2 mmol/L                            

 

             BUN (test code = 05D) 16 mg/dL     7-18                      

 

             CREATININE (test code = 03E) 1.3 mg/dL    0.4-1.1      H           

 

 

             BUN/CREA (test code = BCR) 13           12-20                     

 

             CALCIUM (test code = 09D) 8.7 mg/dL    8.3-9.5                   

 

             BILI TOTAL (test code = 11A) 1.3 mg/dL    0.2-1.0      H           

 

 

             PROTEIN (test code = 07D) 6.5 g/dL     6.4-8.2                   

 

             ALBUMIN (test code = 08D) 3.2 g/dL     3.5-4.8      L            

 

             GLOBULIN (test code = GLB) 3.3 g/dL     1.5-3.8                   

 

             ALB/GLOB (test code = AGRR) 1.0          1.0-2.6                   

 

             ALK PHOS (test code = 35A) 101 IU/L                         

 

             AST (test code = 30A) 21 IU/L      <=42                      

 

             ALT (test code = 31A) 41 IU/L      <=78                      



TROPONIN [id number] 11:57:00





             Test Item    Value        Reference Range Interpretation Comments

 

             TROPONIN I (test code = A84) <0.015 ng/mL 0.000-0.045              

 



XR CHEST 1 VIEW [id number] 11:55:22EXAM: XR CHEST 1 VIEW 
PORTABLE.LOCATION: D4.HISTORY: [id number]: Chest pain.COMPARISON: Radiograph dated
2019.TECHNIQUE: Single AP view of the chest was obtained. FINDINGS:The 
heart is enlarged in size. Small left pleural effusion and left basilaropacities
are present. There is elevation of the right hemidiaphragm. No acuteosseous 
abnormality is identified.IMPRESSION:Small left pleural effusion with left 
basilar opacities, which may representatelectasis or infiltrates.Cardiomegaly.
PRO TIME AND [id number] 11:43:00





             Test Item    Value        Reference Range Interpretation Comments

 

             PT (test code = 13.4 s       9.8-13.6                  



             TT)                                                 

 

             INR (test code = 1.2                                    



             INR)                                                

 

             INRH (test code =      **** SUGGESTED                           



             INRH)        THERAPEUTIC RANGE FOR INR:                           



                          ****    2.5 - 3.5 **  For                           



                          Patients with Prosthetic                           



                          Valves or Patients with                           



                                          recurrent                           



                          Thromboembolic Events **                           



                          2.0 - 3.0 ** For Most Other                           



                          Applications **                           

 

             PTT (test code = 30.4 s       20.2-38.0                 



             PTT)                                                

 

             PTTH (test code = **** To monitor the                           



             PTTH)        effectiveness of heparin,                           



                          we offer the Anti-Xa                           



                          (Heparin Assay). It can be                           



                          used for either                           



                          unfractionated or LMW                           



                          Heparin. Order Code is                           



                          ANTI-XA ****                           



CBC (INCLUDES AUTOMATED DIFFERENTIAL)2019 11:36:00





             Test Item    Value        Reference Range Interpretation Comments

 

             WBC (test code = WBC) 7.6 10\S\3/uL 4.5-11.0                  

 

             RBC (test code = RBC) 3.53 10\S\6/uL 4.20-5.60    L            

 

             HGB (test code = HBG) 10.3 g/dL    12.0-15.5    L            

 

             HCT (test code = HCT) 33.5 %       35.0-44.0    L            

 

             MCV (test code = MCV) 94.9 fL      81.0-99.0                 

 

             MCH (test code = MCH) 29.2 pg      27.0-31.0                 

 

             MCHC (test code = MCHC) 30.7 g/dL    32.0-36.0    L            

 

             RDW (test code = RDW) 15.4 %       11.5-14.5    H            

 

             PLT (test code = PLT) 164 10\S\3/uL 130-400                   

 

             MPV (test code = MPV) 11.7 fL      9.4-12.4                  

 

             NEUTROP # (test code = NE#) 5.6 10\S\3/uL 1.6-8.0                  

 

 

             LYMPH # (test code = LY#) 1.4 10\S\3/uL 1.1-3.5                   

 

             MONOCYTE # (test code = MO#) 0.4 10\S\3/uL 0.0-1.1                 

  

 

             EOSINOPH # (test code = EO#) 0.1 10\S\3/uL 0.0-0.7                 

  

 

             BASOPHIL # (test code = BA#) 0.0 10\S\3/uL 0.0-0.3                 

  

 

             IG # (test code = IG#) 0.04 10\S\3/uL 0.00-0.06                 

 

             NRBC # (test code = NRBC#) 0.00 10\S\3/uL 0.00-0.01                

 

 

             NEUTROPH % (test code = NE%) 73.9 %       35.0-73.0    H           

 

 

             LYMPH % (test code = LY%) 18.0 %       20.0-55.0    L            

 

             MONO % (test code = MO%) 5.7 %        2.5-10.0                  

 

             EOSINOPH % (test code = EO%) 1.6 %        0.0-5.0                  

 

 

             BASOPHIL % (test code = BA%) 0.3 %        0.0-2.0                  

 

 

             IG % (test code = IG%) 0.5 %        0.0-0.8                   

 

             NRBC% (test code = NRBC%) 0.0 %        0.0-0.2                   

 

             MANDIFF (test code = MDIFF) NO           NO                        

 

             RBC MORPH (test code = RBCMOR)              NORMAL                 

   



CBC WITH  [id number] 15:29:00





             Test Item    Value        Reference Range Interpretation Comments

 

             WBC (test code = WBC) 9.7 10\S\3/uL 4.5-11.0                  

 

             RBC (test code = RBC) 3.73 10\S\6/uL 4.20-5.60    L            

 

             HGB (test code = HBG) 11.0 g/dL    12.0-15.5    L            

 

             HCT (test code = HCT) 34.1 %       35.0-44.0    L            

 

             MCV (test code = MCV) 91.4 fL      81.0-99.0                 

 

             MCH (test code = MCH) 29.5 pg      27.0-31.0                 

 

             MCHC (test code = MCHC) 32.3 g/dL    32.0-36.0                 

 

             RDW (test code = RDW) 14.2 %       11.5-14.5                 

 

             PLT (test code = PLT) 116 10\S\3/uL 130-400      L            

 

             MPV (test code = MPV) 11.8 fL      9.4-12.4                  

 

             NEUTROP # (test code = NE#) 7.9 10\S\3/uL 1.6-8.0                  

 

 

             LYMPH # (test code = LY#) 1.0 10\S\3/uL 1.1-3.5      L            

 

             MONOCYTE # (test code = 0.6 10\S\3/uL 0.0-1.1                   



             MO#)                                                

 

             EOSINOPH # (test code = 0.1 10\S\3/uL 0.0-0.7                   



             EO#)                                                

 

             BASOPHIL # (test code = 0.0 10\S\3/uL 0.0-0.3                   



             BA#)                                                

 

             IG # (test code = IG#) 0.04 10\S\3/uL 0.00-0.06                 

 

             NRBC # (test code = NRBC#) 0.00 10\S\3/uL 0.00-0.01                

 

 

             NEUTROPH % (test code = 81.6 %       35.0-73.0    H            



             NE%)                                                

 

             LYMPH % (test code = LY%) 10.3 %       20.0-55.0    L            

 

             MONO % (test code = MO%) 6.4 %        2.5-10.0                  

 

             EOSINOPH % (test code = 1.0 %        0.0-5.0                   



             EO%)                                                

 

             BASOPHIL % (test code = 0.3 %        0.0-2.0                   



             BA%)                                                

 

             IG % (test code = IG%) 0.4 %        0.0-0.8                   

 

             NRBC% (test code = NRBC%) 0.0 %        0.0-0.2                   

 

             PLT EST (test code = ADEQUATE     ADEQUATE                  



             PLTEST)                                             

 

             PLT MORPH (test code = NORMAL (1.5-3 um) NORMAL                    



             PLTMOR)                                             



BASIC METABOLIC [id number] 15:26:00





             Test Item    Value        Reference Range Interpretation Comments

 

             GLUCOSE (test code = 06D) 118 mg/dL           H            

 

             SODIUM (test code = 01A) 136 mmol/L   136-145                   

 

             POTASSIUM (test code = 01B) 4.2 mmol/L   3.6-5.1                   

 

             CHLORIDE (test code = 04A) 106 mmol/L                       

 

             CO2 (test code = 02A) 24 mmol/L    22-32                     

 

             ANION GAP (test code = ANG) 10.2 mmol/L                            

 

             BUN (test code = 05D) 38 mg/dL     7-18         H            

 

             CREATININE (test code = 03E) 1.6 mg/dL    0.4-1.1      H           

 

 

             BUN/CREA (test code = BCR) 23           12-20        H            

 

             CALCIUM (test code = 09D) 9.2 mg/dL    8.3-9.5                   



CARDIAC [id number] 06:04:00





             Test Item    Value        Reference Range Interpretation Comments

 

             TROPONIN I (test code = A84) <0.015 ng/mL 0.000-0.045              

 



BASIC METABOLIC [id number] 05:52:00





             Test Item    Value        Reference Range Interpretation Comments

 

             GLUCOSE (test code = 06D) 171 mg/dL           H            

 

             SODIUM (test code = 01A) 138 mmol/L   136-145                   

 

             POTASSIUM (test code = 01B) 4.0 mmol/L   3.6-5.1                   

 

             CHLORIDE (test code = 04A) 107 mmol/L                       

 

             CO2 (test code = 02A) 23 mmol/L    22-32                     

 

             ANION GAP (test code = ANG) 12.0 mmol/L                            

 

             BUN (test code = 05D) 19 mg/dL     7-18         H            

 

             CREATININE (test code = 03E) 1.2 mg/dL    0.4-1.1      H           

 

 

             BUN/CREA (test code = BCR) 15           12-20                     

 

             CALCIUM (test code = 09D) 8.5 mg/dL    8.3-9.5                   



CBC (INCLUDES AUTOMATED DIFFERENTIAL)2019 05:30:00





             Test Item    Value        Reference Range Interpretation Comments

 

             WBC (test code = WBC) 14.0 10\S\3/uL 4.5-11.0     H            

 

             RBC (test code = RBC) 3.64 10\S\6/uL 4.20-5.60    L            

 

             HGB (test code = HBG) 10.8 g/dL    12.0-15.5    L            

 

             HCT (test code = HCT) 33.2 %       35.0-44.0    L            

 

             MCV (test code = MCV) 91.2 fL      81.0-99.0                 

 

             MCH (test code = MCH) 29.7 pg      27.0-31.0                 

 

             MCHC (test code = MCHC) 32.5 g/dL    32.0-36.0                 

 

             RDW (test code = RDW) 14.0 %       11.5-14.5                 

 

             PLT (test code = PLT) 143 10\S\3/uL 130-400                   

 

             MPV (test code = MPV) 11.4 fL      9.4-12.4                  

 

             NEUTROP # (test code = NE#) 12.1 10\S\3/uL 1.6-8.0      H          

  

 

             LYMPH # (test code = LY#) 0.9 10\S\3/uL 1.1-3.5      L            

 

             MONOCYTE # (test code = MO#) 0.9 10\S\3/uL 0.0-1.1                 

  

 

             EOSINOPH # (test code = EO#) 0.0 10\S\3/uL 0.0-0.7                 

  

 

             BASOPHIL # (test code = BA#) 0.0 10\S\3/uL 0.0-0.3                 

  

 

             IG # (test code = IG#) 0.10 10\S\3/uL 0.00-0.06    H            

 

             NRBC # (test code = NRBC#) 0.00 10\S\3/uL 0.00-0.01                

 

 

             NEUTROPH % (test code = NE%) 86.2 %       35.0-73.0    H           

 

 

             LYMPH % (test code = LY%) 6.3 %        20.0-55.0    L            

 

             MONO % (test code = MO%) 6.6 %        2.5-10.0                  

 

             EOSINOPH % (test code = EO%) 0.0 %        0.0-5.0                  

 

 

             BASOPHIL % (test code = BA%) 0.2 %        0.0-2.0                  

 

 

             IG % (test code = IG%) 0.7 %        0.0-0.8                   

 

             NRBC% (test code = NRBC%) 0.0 %        0.0-0.2                   

 

             MANDIFF (test code = MDIFF) NO           NO                        

 

             RBC MORPH (test code = RBCMOR)              NORMAL                 

   



CARDIAC [id number] 23:08:00





             Test Item    Value        Reference Range Interpretation Comments

 

             TROPONIN I (test code = A84) <0.015 ng/mL 0.000-0.045              

 



CT DISSECTION [id number] 19:26:35Exam: CT thorax PE protocol.Location: 
12History: [id number]: Chest painTechnique: Enhanced spiral slices were taken from
the apices of the lungs,through the upper abdomen utilizing a pulmonary embolism
protocol. Multiplanarreformations were performed. 100cc of Omnipaque were used. 
One or more of thefollowing radiation dose reduction techniques was used: 
automated exposurecontrol, adjustment of mA and/or KV according to patient size,
and/orutilization of iterative reconstruction technique.Findings:Nofilling 
defects are seen in the main pulmonary arteries. The pulmonaryvasculature is 
normal. No pulmonary venous congestion or arterial hypertensionis seen.The lungs
are clear. No infiltration or effusion is seen. No mass or nodule isseen.The 
mediastinum and the pulmonary kandis are normal. Calcified granulomas arenoted. No
lymphadenopathy is present. The heart size is  normal.No pericardialeffusion is
seen.The  visualized upper abdominal organs are unremarkable.Impression:1. 
Negative for pulmonary embolism.2. No acute disease.THYROID PANEL/SCREEN (TSH)
2019 18:29:00





             Test Item    Value        Reference Range Interpretation Comments

 

             TSH (test code = A57) 0.706 uIU/mL 0.358-3.740               



LIVER [id number] 18:28:00





             Test Item    Value        Reference Range Interpretation Comments

 

             BILI TOTAL (test code = 11A) 0.9 mg/dL    0.2-1.0                  

 

 

             BILI DIRCT (test code = 12A) 0.2 mg/dL    0.0-0.2                  

 

 

             BILI INDIR (test code = BILII) 0.7 mg/dL    <=0.8                  

   

 

             PROTEIN (test code = 07D) 7.7 g/dL     6.4-8.2                   

 

             ALBUMIN (test code = 08D) 3.8 g/dL     3.5-4.8                   

 

             GLOBULIN (test code = GLB) 3.9 g/dL     1.5-3.8      H            

 

             ALB/GLOB (test code = AGRR) 1.0          1.0-2.6                   

 

             ALK PHOS (test code = 35A) 106 IU/L                         

 

             AST (test code = 30A) 25 IU/L      <=42                      

 

             ALT (test code = 31A) 21 IU/L      <=78                      



BRAIN NATRIURETIC [id number] 18:19:00





             Test Item    Value        Reference Range Interpretation Comments

 

             proBNP (test code = PBNP) 518 pg/mL    0-125        H            



[id number] 18:15:00





             Test Item    Value        Reference Range Interpretation Comments

 

             MAGNESIUM (test code = 48A) 1.9 mg/dL    1.8-2.4                   



[id number] 17:40:00





             Test Item    Value        Reference Range Interpretation Comments

 

             D-DIMER (test code = <200 ng/mL D-DU 0-234                     



             DDI)                                                

 

             D-DIMER COMMENT (test *Level to rule out                           



             code = DDCOM) DVT or PE: <235 ng/mL                           



                          D-DU*                                  



CARDIAC [id number] 17:31:00





             Test Item    Value        Reference Range Interpretation Comments

 

             TROPONIN I (test code = A84) <0.015 ng/mL 0.000-0.045              

 



BASIC METABOLIC [id number] 17:27:00





             Test Item    Value        Reference Range Interpretation Comments

 

             GLUCOSE (test code = 06D) 114 mg/dL           H            

 

             SODIUM (test code = 01A) 142 mmol/L   136-145                   

 

             POTASSIUM (test code = 01B) 4.0 mmol/L   3.6-5.1                   

 

             CHLORIDE (test code = 04A) 111 mmol/L          H            

 

             CO2 (test code = 02A) 26 mmol/L    22-32                     

 

             ANION GAP (test code = ANG) 9.0 mmol/L                             

 

             BUN (test code = 05D) 19 mg/dL     7-18         H            

 

             CREATININE (test code = 03E) 1.2 mg/dL    0.4-1.1      H           

 

 

             BUN/CREA (test code = BCR) 15           12-20                     

 

             CALCIUM (test code = 09D) 9.2 mg/dL    8.3-9.5                   



CBC (INCLUDES AUTOMATED DIFFERENTIAL)2019 17:26:00





             Test Item    Value        Reference Range Interpretation Comments

 

             WBC (test code = WBC) 12.6 10\S\3/uL 4.5-11.0     H            

 

             RBC (test code = RBC) 4.04 10\S\6/uL 4.20-5.60    L            

 

             HGB (test code = HBG) 11.9 g/dL    12.0-15.5    L            

 

             HCT (test code = HCT) 37.6 %       35.0-44.0                 

 

             MCV (test code = MCV) 93.1 fL      81.0-99.0                 

 

             MCH (test code = MCH) 29.5 pg      27.0-31.0                 

 

             MCHC (test code = MCHC) 31.6 g/dL    32.0-36.0    L            

 

             RDW (test code = RDW) 13.8 %       11.5-14.5                 

 

             PLT (test code = PLT) 152 10\S\3/uL 130-400                   

 

             MPV (test code = MPV) 11.3 fL      9.4-12.4                  

 

             NEUTROP # (test code = NE#) 10.7 10\S\3/uL 1.6-8.0      H          

  

 

             LYMPH # (test code = LY#) 1.1 10\S\3/uL 1.1-3.5                   

 

             MONOCYTE # (test code = MO#) 0.6 10\S\3/uL 0.0-1.1                 

  

 

             EOSINOPH # (test code = EO#) 0.1 10\S\3/uL 0.0-0.7                 

  

 

             BASOPHIL # (test code = BA#) 0.0 10\S\3/uL 0.0-0.3                 

  

 

             IG # (test code = IG#) 0.06 10\S\3/uL 0.00-0.06                 

 

             NRBC # (test code = NRBC#) 0.00 10\S\3/uL 0.00-0.01                

 

 

             NEUTROPH % (test code = NE%) 85.2 %       35.0-73.0    H           

 

 

             LYMPH % (test code = LY%) 8.4 %        20.0-55.0    L            

 

             MONO % (test code = MO%) 5.0 %        2.5-10.0                  

 

             EOSINOPH % (test code = EO%) 0.6 %        0.0-5.0                  

 

 

             BASOPHIL % (test code = BA%) 0.3 %        0.0-2.0                  

 

 

             IG % (test code = IG%) 0.5 %        0.0-0.8                   

 

             NRBC% (test code = NRBC%) 0.0 %        0.0-0.2                   

 

             MANDIFF (test code = MDIFF) NO           NO                        



PTT (PARTIAL THROMBOPLASTIN TIME)2019 17:26:00





             Test Item    Value        Reference Range Interpretation Comments

 

             PTT (test code = 31.9 s       20.2-38.0                 



             PTT)                                                

 

             PTTH (test code = **** To monitor the                           



             PTTH)        effectiveness of heparin,                           



                          we offer the Anti-Xa                           



                          (Heparin Assay). It can be                           



                          used for either                           



                          unfractionated or LMW                           



                          Heparin. Order Code is                           



                          ANTI-XA ****                           



PROTHROMBIN [id number] 17:26:00





             Test Item    Value        Reference Range Interpretation Comments

 

             PT (test code = 12.0 s       9.8-13.6                  



             TT)                                                 

 

             INR (test code = 1.1                                    



             INR)                                                

 

             INRH (test code =      **** SUGGESTED                           



             INRH)        THERAPEUTIC RANGE FOR INR:                           



                          ****    2.5 - 3.5 **  For                           



                          Patients with Prosthetic                           



                          Valves or Patients with                           



                                          recurrent                           



                          Thromboembolic Events **                           



                          2.0 - 3.0 ** For Most Other                           



                          Applications **                           



XR CHEST 1 VIEW [id number] 17:04:28Portable AP chest, 1 viewLocation 
Code: [id number] HISTORY: Chest painCOMPARISON: NoneCOMMENT: Mild atelectasis is
present within the lung bases. The costophrenic angles aresharp. The 
cardiomediastinalsilhouette is unremarkable. The bones are intact.IMPRESSION: 
Mild bibasilar atelectasis. Otherwise, no acute abnormalityCHEM [id number] 
16:51:00





             Test Item    Value        Reference Range Interpretation Comments

 

             Creatinine Lvl (test code = Creatinine 1.15         0.50-1.40      

           



             Lvl)                                                



[facility]2017-01-24 16:51:00





             Test Item    Value        Reference Range Interpretation Comments

 

             BUN (test code = BUN) 16           7-22                      



[facility]2017-01-24 16:51:00





             Test Item    Value        Reference Range Interpretation Comments

 

             Chloride Lvl (test code = Chloride Lvl) 109                  

            



[facility]2017-01-24 16:51:00





             Test Item    Value        Reference Range Interpretation Comments

 

             Potassium Lvl (test code = Potassium 4.3          3.5-5.1          

         



             Lvl)                                                



[facility]2017-01-24 16:51:00





             Test Item    Value        Reference Range Interpretation Comments

 

             Sodium Lvl (test code = Sodium Lvl) 144          135-145           

        



[facility]2017-01-24 16:51:00





             Test Item    Value        Reference Range Interpretation Comments

 

             CO2 (test code = CO2) 28           24-32                     



[facility]2017-01-24 16:51:00





             Test Item    Value        Reference Range Interpretation Comments

 

             Calcium Lvl (test code = Calcium Lvl) 8.4          8.5-10.5        

          



[facility]2017-01-24 16:51:00





             Test Item    Value        Reference Range Interpretation Comments

 

             AGAP (test code = AGAP) 11.3         10.0-20.0                 



[facility]2017-01-24 16:51:00





             Test Item    Value        Reference Range Interpretation Comments

 

             Glucose Lvl (test code = Glucose Lvl) 109          70-99           

          



[facility]2017-01-24 16:51:00





             Test Item    Value        Reference Range Interpretation Comments

 

             eGFR (test code = eGFR) 52                                     



[facility]

## 2022-02-23 NOTE — ER
Nurse's Notes                                                                                     

 [facility]                                                                 

Name: Juli Rushing                                                                             

Age: 66 yrs                                                                                       

Sex: Female                                                                                       

: 1955                                                                                   

MRN: [id number]                                                                                   

Arrival Date: 2022                                                                          

Time: 12:33                                                                                       

Account#: [id number]                                                                            

Bed 26                                                                                            

Private MD: Ciera Rosenthal                                                            

Diagnosis: End stage renal disease                                                                

                                                                                                  

Presentation:                                                                                     

                                                                                             

13:07 Chief complaint: Patient states: she was told to come to the ED from her PCP who        ap3 

      informed the patient her blood draw showed she had high potassium. Patient stated the       

      blood was drawn during dialysis yesterday. She is a MWF dialysis patient. Coronavirus       

      screen: At this time, the client does not indicate any symptoms associated with             

      coronavirus-19. Ebola Screen: No symptoms or risks identified at this time. Initial         

      Sepsis Screen: Does the patient meet any 2 criteria? No. Patient's initial sepsis           

      screen is negative. Does the patient have a suspected source of infection? No.              

      Patient's initial sepsis screen is negative. Risk Assessment: Do you want to hurt           

      yourself or someone else? Patient reports no desire to harm self or others. Onset of        

      symptoms was 2022.                                                             

13:07 Method Of Arrival: Ambulatory                                                           ap3 

13:07 Acuity: MIRTHA 3                                                                           ap3 

                                                                                                  

Triage Assessment:                                                                                

13:09 General: Appears in no apparent distress. Behavior is calm, cooperative, appropriate    ap3 

      for age. Pain: Denies pain. Neuro: No deficits noted. Level of Consciousness is awake,      

      alert, obeys commands, Oriented to person, place, time, situation, Appropriate for age      

      Gait is uses walker. Cardiovascular: Denies chest pain. Respiratory: Airway is patent       

      Respiratory effort is even, unlabored, Respiratory pattern is regular, symmetrical.         

13:09 Derm:.                                                                                  ap3 

                                                                                                  

Historical:                                                                                       

- Allergies:                                                                                      

13:06 cefepime;                                                                               ap3 

13:06 PENICILLINS;                                                                            ap3 

13:06 QUINOLONES;                                                                             ap3 

13:06 Codeine;                                                                                ap3 

- PMHx:                                                                                           

13:06 Atrial fibrillation; Dialysis; High Cholesterol; Hypertensive disorder; stage 4 kidney  ap3 

      failure;                                                                                    

13:08 Congestive heart failure;                                                               ap3 

                                                                                                  

- Immunization history:: Client reports having NOT received the Covid vaccine.                    

  Pneumococcal vaccine is up to date, Flu vaccine is up to date.                                  

- Social history:: Smoking status: Patient denies any tobacco usage or history of.                

- Family history:: not pertinent.                                                                 

                                                                                                  

                                                                                                  

Screenin:10 Abuse screen: Denies threats or abuse. Nutritional screening: No deficits noted.        ap3 

      Tuberculosis screening: No symptoms or risk factors identified.                             

15:43 Fall Risk None identified.                                                              lr4 

                                                                                                  

Assessment:                                                                                       

13:54 General: Appears in no apparent distress. comfortable, Behavior is calm, cooperative.   lr4 

      Neuro: No deficits noted. Cardiovascular: Denies chest pain, lightheadedness, Heart         

      tones S1 S2 Capillary refill < 3 seconds. Respiratory: Reports Airway is patent             

      Respiratory effort is Respiratory pattern is regular, Pt on home 02 at 2 L.                 

      Musculoskeletal: No deficits noted.                                                         

13:54 Cardiovascular: Rhythm is atrial fibrillation With PVC's.                               lr4 

16:33 General: pt departed ed ambulatory with  and all personal effects, pt in nad.    lr4 

                                                                                                  

Vital Signs:                                                                                      

13:07  / 72; Pulse 79; Resp 17; Temp 97.9; Pulse Ox 99% on 2 lpm NC; Weight 105.69 kg;  ap3 

      Height 5 ft. 7 in. (170.18 cm);                                                             

14:00  / 55; Pulse 80; Resp 18; Pulse Ox 100% on 2 lpm NC;                              lr4 

14:16  / 52; Pulse 71; Resp 17; Pulse Ox 100% on 2 lpm NC;                              lr4 

15:42  / 55; Pulse 64; Resp 18; Pulse Ox 100% ;                                         lr4 

16:32  / 71; Pulse 71; Resp 18; Pulse Ox 99% ;                                          lr4 

13:07 Body Mass Index 36.49 (105.69 kg, 170.18 cm)                                            ap3 

                                                                                                  

ED Course:                                                                                        

12:33 Patient arrived in ED.                                                                  as  

12:33 Ciera Rosenthal MD is Private Physician.                                    as  

13:08 Triage completed.                                                                       ap3 

13:10 Arm band placed on right wrist.                                                         ap3 

13:25 Eliecer Chu MD is Attending Physician.                                           ma2 

13:53 Suzanne Steen, RN is Primary Nurse.                                                 lr4 

13:53 Basic Metabolic Panel Sent.                                                             lr4 

13:53 CBC with Diff Sent.                                                                     lr4 

13:53 Magnesium Sent.                                                                         lr4 

13:54 Inserted saline lock: 20 gauge in right antecubital area, using aseptic technique.      lr4 

14:15 Basic Metabolic Panel Sent.                                                             lr4 

15:42 No provider procedures requiring assistance completed.                                  lr4 

16:32 Patient has correct armband on for positive identification. Call light in reach. Side   lr4 

      rails up X 1. Adult w/ patient.                                                             

16:32 IV discontinued.                                                                        lr4 

                                                                                                  

Administered Medications:                                                                         

No medications were administered                                                                  

                                                                                                  

                                                                                                  

Outcome:                                                                                          

15:43 Condition: good                                                                         lr4 

16:04 Discharge ordered by MD.                                                                ma2 

16:32 Discharged to home ambulatory.                                                          lr4 

16:32 Discharge instructions given to patient, family.                                            

16:33 Patient left the ED.                                                                    lr4 

                                                                                                  

Signatures:                                                                                       

Lynnette Hutchinson Mohammad, MD MD   ma2                                                  

Basia Coulter RN                    RN   ap3                                                  

Suzanne Steen RN                   RN   lr4                                                  

                                                                                                  

**************************************************************************************************

## 2022-03-13 ENCOUNTER — HOSPITAL ENCOUNTER (EMERGENCY)
Dept: HOSPITAL 97 - ER | Age: 67
Discharge: HOME | End: 2022-03-13
Payer: COMMERCIAL

## 2022-03-13 VITALS — OXYGEN SATURATION: 100 % | TEMPERATURE: 98.1 F

## 2022-03-13 VITALS — DIASTOLIC BLOOD PRESSURE: 95 MMHG | SYSTOLIC BLOOD PRESSURE: 119 MMHG

## 2022-03-13 DIAGNOSIS — I13.2: ICD-10-CM

## 2022-03-13 DIAGNOSIS — Z88.5: ICD-10-CM

## 2022-03-13 DIAGNOSIS — Z99.2: ICD-10-CM

## 2022-03-13 DIAGNOSIS — E78.00: ICD-10-CM

## 2022-03-13 DIAGNOSIS — K59.00: ICD-10-CM

## 2022-03-13 DIAGNOSIS — Z88.8: ICD-10-CM

## 2022-03-13 DIAGNOSIS — Z88.0: ICD-10-CM

## 2022-03-13 DIAGNOSIS — I50.9: ICD-10-CM

## 2022-03-13 DIAGNOSIS — N18.6: ICD-10-CM

## 2022-03-13 DIAGNOSIS — K51.511: Primary | ICD-10-CM

## 2022-03-13 DIAGNOSIS — I48.91: ICD-10-CM

## 2022-03-13 LAB
ALBUMIN SERPL BCP-MCNC: 3.2 G/DL (ref 3.4–5)
ALP SERPL-CCNC: 276 U/L (ref 45–117)
ALT SERPL W P-5'-P-CCNC: 19 U/L (ref 12–78)
AST SERPL W P-5'-P-CCNC: 18 U/L (ref 15–37)
BUN BLD-MCNC: 39 MG/DL (ref 7–18)
GLUCOSE SERPLBLD-MCNC: 104 MG/DL (ref 74–106)
HCT VFR BLD CALC: 39.9 % (ref 36–45)
INR BLD: 1.21
LIPASE SERPL-CCNC: 44 U/L (ref 73–393)
LYMPHOCYTES # SPEC AUTO: 0.9 K/UL (ref 0.7–4.9)
MAGNESIUM SERPL-MCNC: 2.2 MG/DL (ref 1.8–2.4)
PMV BLD: 8.1 FL (ref 7.6–11.3)
POTASSIUM SERPL-SCNC: 4.3 MMOL/L (ref 3.5–5.1)
RBC # BLD: 4.09 M/UL (ref 3.86–4.86)
TROPONIN I SERPL HS-MCNC: 31.1 PG/ML (ref ?–58.9)

## 2022-03-13 PROCEDURE — 83690 ASSAY OF LIPASE: CPT

## 2022-03-13 PROCEDURE — 99284 EMERGENCY DEPT VISIT MOD MDM: CPT

## 2022-03-13 PROCEDURE — 85610 PROTHROMBIN TIME: CPT

## 2022-03-13 PROCEDURE — 74176 CT ABD & PELVIS W/O CONTRAST: CPT

## 2022-03-13 PROCEDURE — 80048 BASIC METABOLIC PNL TOTAL CA: CPT

## 2022-03-13 PROCEDURE — 84484 ASSAY OF TROPONIN QUANT: CPT

## 2022-03-13 PROCEDURE — 96374 THER/PROPH/DIAG INJ IV PUSH: CPT

## 2022-03-13 PROCEDURE — 71045 X-RAY EXAM CHEST 1 VIEW: CPT

## 2022-03-13 PROCEDURE — 36415 COLL VENOUS BLD VENIPUNCTURE: CPT

## 2022-03-13 PROCEDURE — 93005 ELECTROCARDIOGRAM TRACING: CPT

## 2022-03-13 PROCEDURE — 96375 TX/PRO/DX INJ NEW DRUG ADDON: CPT

## 2022-03-13 PROCEDURE — 83735 ASSAY OF MAGNESIUM: CPT

## 2022-03-13 PROCEDURE — 83880 ASSAY OF NATRIURETIC PEPTIDE: CPT

## 2022-03-13 PROCEDURE — 80076 HEPATIC FUNCTION PANEL: CPT

## 2022-03-13 PROCEDURE — 85025 COMPLETE CBC W/AUTO DIFF WBC: CPT

## 2022-03-13 NOTE — XMS REPORT
Continuity of Care Document

                            Created on:2022



Patient:SUZI SEARS

Sex:Female

:1955

External Reference #:[id number]





Demographics







                          Address                   [address] [place] [address] [address] [place] [address]

 

                          Home Phone                [phone]

 

                          Work Phone                [phone]

 

                          Mobile Phone              [phone]

 

                          Email Address             [email or web address]

 

                          Preferred Language        English

 

                          Marital Status            Unknown

 

                          Buddhist Affiliation     Unknown

 

                          Race                      Unknown

 

                          Additional Race(s)        Unavailable

 

                          Ethnic Group              Unknown









Author







                          Organization              [facility]

t

 

                          Address                   [address]



                                                    [place] [address]

 

                          Phone                     [phone]









Support







                Name            Relationship    Address         Phone

 

                EDUARDO SEARS              [address] [phone]



                                                [place] [address] 

 

                EDUARDO SEARS      FAUSTO              Unavailable     [phone]









Care Team Providers







                    Name                Role                Phone

 

                    ANABEL              Attending Clinician Unavailable

 

                    TONO           Attending Clinician Unavailable

 

                    DANIELLA               Attending Clinician Unavailable

 

                    MD ALIYA LAMAR Attending Clinician Unavailable

 

                    LAKHWINDER           Attending Clinician Unavailable

 

                    RADHA              Attending Clinician Unavailable

 

                    ANAIS          Attending Clinician Unavailable

 

                    MD ANAIS      Attending Clinician Unavailable

 

                    MD ANABEL  OBIOMA  Attending Clinician Unavailable

 

                    MD LATISHA POWER Attending Clinician Unavailable

 

                    DANIELLA               Attending Clinician Unavailable

 

                    CHAD              Attending Clinician Unavailable

 

                    DR KEIKO            Attending Clinician Unavailable

 

                    TONO           Admitting Clinician Unavailable

 

                    MD ALIYA LAMAR Admitting Clinician Unavailable

 

                    ANAIS          Admitting Clinician Unavailable

 

                    MD ANAIS      Admitting Clinician Unavailable

 

                    ANABEL              Admitting Clinician Unavailable

 

                    MD JOSEY BECKHAM        Admitting Clinician Unavailable

 

                    MD LATISHA POWER Admitting Clinician Unavailable

 

                    DR KEIKO            Admitting Clinician Unavailable









Problems







       Condition Condition Condition Status Onset  Resolution Last   Treating Co

mments 

Source



       Name   Details Category        Date   Date   Treatment Clinician        



                                                 Date                 

 

       SOLO           Diagnosis Active 2021               [facility]

[facility]



                                             12:03:00               l



                SOLO                00:00:                             Barron



                                   00                                 



                                                                      



              Active                                                  



                                                                      



                                                                      



              2021                                                  



                                                                      



                                                                      



                                                                      



                                                                      



                                                                      



                                                                      



                                                                      



                                                                      



                                                                       



              Carney                                                  



                                                                      



                                                                      

 

       COLON         Diagnosis Active 2017               [facility]

[facility]



       CANCER                                05:49:00               l



       SCREENING-   COLON               00:00:                             M[facility]

nn



       Z12.11 CANCER               00                                 



              SCREENING-                                                  



              Z12.11                                                  



                                                                      



                                                                      



              Active                                                  



                                                                      



                                                                      



              2017                                                  



                                                                      



                                                                      



                                                                      



                                                                      



                                                                      



                                                                      



                                                                      



                                                                      



                                                                       



              Carney                                                  



                                                                      



                                                                      

 

       R92.1 -        Diagnosis Active 2016               Me

moria



       MAMMOGRAPH                      4-12          15:55:00               l



       IC       R92.1 -               00:01:                             [facility]



       CALCIFCN MAMMOGRAPH               00                                 



       FOUND ON IC                                                      



              CALCIFCN                                                  



              FOUND ON                                                  



                                                                      



                                                                      



              Active                                                  



                                                                      



                                                                      



              2016                                                  



                                                                      



                                                                      



                                                                      



                                                                      



                                                                      



                                                                      



                                                                      



                                                                      



                                                                       



              [facility]                                                  



              [facility]                                                    



                                                                      



                                                                      

 

       Z12.31 -        Diagnosis Active 2016               M

[facility]



       ENCNTR                      -          07:08:00               l



       SCREEN   Z12.31 -               00:01:                             Donny martinez



       MAMMOGRAM ENCNTR               00                                 



       FOR MA SCREEN                                                  



              MAMMOGRAM                                                  



              FOR MA                                                  



                                                                      



                                                                      



              Active                                                  



                                                                      



                                                                      



              2016                                                  



                                                                      



                                                                      



                                                                      



                                                                      



                                                                      



                                                                      



                                                                      



                                                                      



                                                                       



              [facility]                                                  



              [facility]                                                    



                                                                      



                                                                      

 

       RT WRIST        Diagnosis Active 2017               M

emoria



       DISTAL                      1-          02:03:00               l



       RADIUS   RT WRIST               09:00:                             Donny martinez



       CLSD FX DISTAL               00                                 



              RADIUS                                                  



              CLSD FX                                                  



                                                                      



                                                                      



              Active                                                  



                                                                      



                                                                      



              2016                                                  



                                                                      



                                                                      



                                                                      



                                                                      



                                                                      



                                                                      



                                                                      



                                                                      



                     [facility]                                                  



              [place]                                                  



              [facility]                                                  



              [facility]                                                   



                                                                      



                                                                      

 

       Abnormal        Problem Active 2021               [facility]

oria



       glucose                                22:17:41               l



       level    Abnormal               00:00:                             Donny martinez



       (finding) glucose               00                                 



              level                                                   



              (finding)                                                  



                                                                      



                                                                      



              Active                                                  



                                                                      



                                                                      



              2015                                                  



                                                                      



                                                                      



               Problem                                                  



                                                                      



                                                                      



              2021                                                  



                                                                      



                                                                      



               Data                                                   



              migrated                                                  



              from [place]                                                  



              on 7/8/15.                                                  



                                                                      



                                                                      



                                                                    



              Medical                                                  



              Group,                                                  



              [facility],                                                  



              [facility]                                                  



              [facility],                                                  



              [facility]                                                     



              [facility]                                                  



              [facility]                                                  



              [place]                                                  



              [facility]                                                  



              [facility],                                                  



              [place]                                                  



                                                                      



                                                                      

 

       Lymphedema        Problem Active 2020               M

[facility]



       (disorder)                      2-          00:38:51               l



                                   00:00:                             [facility]



              Lymphedema               00                                 



              (disorder)                                                  



                                                                      



                                                                      



               Active                                                  



                                                                      



                                                                      



              2014                                                  



                                                                      



                                                                      



               Problem                                                  



                                                                      



                                                                      



              2020                                                  



                                                                      



                                                                      



               Data                                                   



              migrated                                                  



              from [facility]                                                  



              on                                                      



              5/30/15.                                                  



                                                                      



                                                                      



               Medical                                                  



              Group,                                                  



              [facility],                                                  



              [facility]                                                  



              [facility],                                                  



              [facility]                                                     



              [facility]                                                  



              [facility]                                                  



              [place]                                                  



              [facility]                                                  



              [facility],                                                  



              [place]                                                  



                                                                      



                                                                      

 

       Obstructiv        Problem Active 2021               M

[facility]



       e sleep                      2-04          22:17:41               l



       apnea                       00:00:                             [facility]



       syndrome Obstructiv               00                                 



       (disorder) e sleep                                                  



              apnea                                                   



              syndrome                                                  



              (disorder)                                                  



                                                                      



                                                                      



               Active                                                  



                                                                      



                                                                      



              2014                                                  



                                                                      



                                                                      



               Problem                                                  



                                                                      



                                                                      



              2021                                                  



                                                                      



                                                                      



               Data                                                   



              migrated                                                  



              from [facility]                                                  



              on                                                      



              5/30/15.                                                  



                                                                      



                                                                      



               Medical                                                  



              Group,                                                  



              [facility],                                                  



              [facility]                                                  



              [facility],                                                  



              [facility]                                                     



              [facility]                                                  



              [facility]                                                  



              [place]                                                  



              [facility]                                                  



              [facility],                                                  



              [place]                                                  



                                                                      



                                                                      

 

       Hypothyroi        Problem Active 2013-0        2019-10-19               M

[facility]



       dism                        4-24          21:25:36               l



       (disorder)                      00:00:                             Donny

n



              Hypothyroi               00                                 



              dism                                                    



              (disorder)                                                  



                                                                      



                                                                      



               Active                                                  



                                                                      



                                                                      



              2013                                                  



                                                                      



                                                                      



               Problem                                                  



                                                                      



                                                                      



              10/19/2019                                                  



                                                                      



                                                                      



               Data                                                   



              migrated                                                  



              from [facility]                                                  



              [employer]                                                  



              on                                                      



              5/30/15.                                                  



                                                                      



                                                                      



               Medical                                                  



              [facility]                                                  



              [facility]                                                    



              [facility],                                                  



              [facility]                                                  



              [facility],                                                  



              [facility]                                                     



              [facility]                                                  



              [facility]                                                  



              [place]                                                  



              [facility]                                                  



              [facility],                                                  



              [place]                                                  



                                                                      



                                                                      

 

       Depressive        Problem Active 2021               [facility]

[facility]



       disorder                      4-24          22:17:41               l



       (disorder)                      00:00:                             Donny

n



              Depressive               00                                 



              disorder                                                  



              (disorder)                                                  



                                                                      



                                                                      



               Active                                                  



                                                                      



                                                                      



              2013                                                  



                                                                      



                                                                      



               Problem                                                  



                                                                      



                                                                      



              2021                                                  



                                                                      



                                                                      



               Data                                                   



              migrated                                                  



              from [employer]                                                  



              [facility]                                                  



              on                                                      



              5/30/15.                                                  



                                                                      



                                                                      



               Medical                                                  



              Group,                                                  



              [facility]                                                    



              [facility],                                                  



              [facility] [place]                                                  



              [facility],                                                  



              [facility]                                                     



              [facility]                                                  



              [facility]                                                  



              [place]                                                  



              [facility]                                                  



              [facility],                                                  



              [place]                                                  



                                                                      



                                                                      

 

       Obesity        Problem Active 2016               [place]

[place]



       (disorder)                      8-20          00:23:22               l



                Obesity               00:00:                             [place]



              (disorder)               00                                 



                                                                      



                                                                      



               Active                                                  



                                                                      



                                                                      



              2012                                                  



                                                                      



                                                                      



               Problem                                                  



                                                                      



                                                                      



              2016                                                  



                                                                      



                                                                      



               Data                                                   



              migrated                                                  



              from [employer]                                                  



              [employer]                                                  



              on                                                      



              5/30/15.                                                  



                                                                      



                                                                      



              [facility]                                                  



              [place],                                                  



              [facility] [place]                                                  



              [facility],                                                  



              [facility]                                                     



              [facility]                                                  



              [facility]                                                  



              [place]                                                  



              [facility]                                                  



              [facility]                                                   



                                                                      



                                                                      

 

       Cobalamin        Problem Active 2021               Me

moria



       deficiency                      7-          22:17:41               l



       (disorder)                      00:00:                             Donny

n



              Cobalamin               00                                 



              deficiency                                                  



              (disorder)                                                  



                                                                      



                                                                      



               Active                                                  



                                                                      



                                                                      



              2012                                                  



                                                                      



                                                                      



               Problem                                                  



                                                                      



                                                                      



              2021                                                  



                                                                      



                                                                      



               Data                                                   



              migrated                                                  



              from [employer]                                                  



              [facility]                                                  



              on                                                      



              5/30/15.                                                  



                                                                      



                                                                      



               Medical                                                  



              [facility]                                                  



              [facility]                                                    



              [facility],                                                  



              [facility] [place]                                                  



              [facility],                                                  



              [facility]                                                     



              [facility]                                                  



              [facility]                                                  



              [place]                                                  



              [facility]                                                  



              [facility],                                                  



              [place]                                                  



                                                                      



                                                                      

 

       Hypertensi        Problem Active 2016               [facility]

[place]



       ve episode                      7-10          00:23:22               l



       (disorder)                      00:00:                             Donny

n



              Hypertensi               00                                 



              ve episode                                                  



              (disorder)                                                  



                                                                      



                                                                      



               Active                                                  



                                                                      



                                                                      



              07/10/2012                                                  



                                                                      



                                                                      



               Problem                                                  



                                                                      



                                                                      



              2016                                                  



                                                                      



                                                                      



               Data                                                   



              migrated                                                  



              from [employer]                                                  



              [facility]                                                  



              on                                                      



              5/30/15.                                                  



                                                                      



                                                                      



              [facility]                                                  



              [facility],                                                  



              [facility]                                                  



              [facility],                                                  



              [facility]                                                     



              [facility]                                                  



              [facility]                                                  



              [place]                                                  



              [facility]                                                  



              [facility]                                                   



                                                                      



                                                                      

 

       Pain in        Problem                      2016               [place]

[place]an



       wrist                                     05:02:18               l



       (finding)   Pain in                                                  [place]

daryl



              wrist                                                   



              (finding)                                                  



                                                                      



                                                                      



                                                                      



                                                                      



                                                                      



                                                                      



                                                                      



              Problem                                                  



                                                                      



                                                                      



              2016                                                  



                                                                      



                                                                      



                                                                      



                                                                      



                                                                      



              [facility]                                                  



              [facility]                                                  



              [facility] [place]                                                  



              [facility]                                                    



                                                                      



                                                                      

 

       Calcificat        Problem Resolve               2021               

[facility]



       ion of               d                    22:17:41               l



       breast                                                         [place]



       (finding) Calcificat                                                  



              ion of                                                  



              breast                                                  



              (finding)                                                  



                                                                      



                                                                      



              Resolved                                                  



                                                                      



                                                                      



                                                                      



                                                                      



               Problem                                                  



                                                                      



                                                                      



              2021                                                  



                                                                      



                                                                      



               Left                                                   



                                                                      



                                                                       



              Medical                                                  



              Group,                                                  



              [facility]                                                    



              [facility],                                                  



              [facility]                                                  



              [facility],                                                  



              [facility]                                                     



              [facility]                                                  



              [facility]                                                  



              [place]                                                  



              [facility]                                                  



              [facility],                                                  



              [place]                                                  



                                                                      



                                                                      

 

       Dysuria        Problem Resolve               2021               [facility]

[facility]



       (finding)               d                    22:17:41               l



                Dysuria                                                  [place]



              (finding)                                                  



                                                                      



                                                                      



              Resolved                                                  



                                                                      



                                                                      



                                                                      



                                                                      



               Problem                                                  



                                                                      



                                                                      



              2021                                                  



                                                                      



                                                                      



                                                                      



                                                                      



                                                                    



              Medical                                                  



              Group,                                                  



              [facility]                                                  



              [facility],                                                  



              [place]                                                  



                                                                      



                                                                      

 

       Acute         Problem Active               2020               [facility]

ia



       urinary                                    22:36:35               l



       tract    Acute                                                  [place]



       infection urinary                                                  



       (disorder) tract                                                   



              infection                                                  



              (disorder)                                                  



                                                                      



                                                                      



               Active                                                  



                                                                      



                                                                      



                                                                      



                                                                      



              Problem                                                  



                                                                      



                                                                      



              2020                                                  



                                                                      



                                                                      



                                                                      



                                                                      



                                                                    



              Medical                                                  



              Group                                                   



                                                                      



                                                                      

 

       Chronic        Problem Active               2020               [place]

[place]



       renal                                     22:36:35               l



       impairment   Chronic                                                  Her

[place]



       (disorder) renal                                                   



              impairment                                                  



              (disorder)                                                  



                                                                      



                                                                      



               Active                                                  



                                                                      



                                                                      



                                                                      



                                                                      



              Problem                                                  



                                                                      



                                                                      



              2020                                                  



                                                                      



                                                                      



                                                                      



                                                                      



                                                                    



              Medical                                                  



              Group,                                                  



              [facility],                                                  



              [facility]                                                  



              [facility],                                                  



              [facility]                                                     



              [facility]                                                  



              [facility]                                                  



              [place]                                                  



              [facility]                                                  



              [facility]                                                  



              [place]                                                  



                                                                      



                                                                      

 

       Diabetes        Problem Active               2020               [facility]

[facility]



       mellitus                                    23:27:54               l



       (disorder)   Diabetes                                                  He

rmann



              mellitus                                                  



              (disorder)                                                  



                                                                      



                                                                      



               Active                                                  



                                                                      



                                                                      



                                                                      



                                                                      



              Problem                                                  



                                                                      



                                                                      



              2020                                                  



                                                                      



                                                                      



                                                                      



                                                                      



                                                                    



              Medical                                                  



              Group,                                                  



              [employer]                                                  



              [facility]                                                    



                                                                      



                                                                      

 

       Urinalysis        Problem Active               2021               [facility]

[facility]



       = abnormal                                    22:17:41               l



       (finding)                                                         [place]



              Urinalysis                                                  



              = abnormal                                                  



              (finding)                                                  



                                                                      



                                                                      



              Active                                                  



                                                                      



                                                                      



                                                                      



                                                                      



              Problem                                                  



                                                                      



                                                                      



              2021                                                  



                                                                      



                                                                      



                                                                      



                                                                      



                                                                    



              Medical                                                  



              Group,                                                  



              [employer]                                                  



              [place],                                                  



              [place]                                                  



                                                                      



                                                                      

 

       Atrial        Problem Active               2021               [facility]

ia



       fibrillati                                    22:17:41               l



       on       Atrial                                                  [place]



       (disorder) fibrillati                                                  



              on                                                      



              (disorder)                                                  



                                                                      



                                                                      



               Active                                                  



                                                                      



                                                                      



                                                                      



                                                                      



              Problem                                                  



                                                                      



                                                                      



              2021                                                  



                                                                      



                                                                      



                                                                      



                                                                      



                                                                    



              Medical                                                  



              Group,                                                  



              [employer] [employer]                                                  



              [place],                                                  



              [place]                                                  



                                                                      



                                                                      

 

       Benign        Problem Active               2021               [facility]

ia



       essential                                    22:17:41               l



       hypertensi   Benign                                                  H[facility]

daryl



       on     essential                                                  



       (disorder) hypertensi                                                  



              on                                                      



              (disorder)                                                  



                                                                      



                                                                      



               Active                                                  



                                                                      



                                                                      



                                                                      



                                                                      



              Problem                                                  



                                                                      



                                                                      



              2021                                                  



                                                                      



                                                                      



                                                                      



                                                                      



                                                                    



              Medical                                                  



              Group,                                                  



              [facility],                                                  



              [employer]                                                  



              [facility],                                                  



              [facility]                                                     



              [facility]                                                  



              [facility]                                                  



              [place]                                                  



              [facility]                                                  



              [facility],                                                  



              [place]                                                  



                                                                      



                                                                      

 

       Cardiorena        Problem Active               2021               [facility]

[facility]



       l syndrome                                    22:17:41               l



       (disorder)                                                         Donny

n



              Cardiorena                                                  



              l syndrome                                                  



              (disorder)                                                  



                                                                      



                                                                      



               Active                                                  



                                                                      



                                                                      



                                                                      



                                                                      



              Problem                                                  



                                                                      



                                                                      



              2021                                                  



                                                                      



                                                                      



                                                                      



                                                                      



                                                                    



              Medical                                                  



              Group,                                                  



              [employer]                                                  



              [facility],                                                  



              [place]                                                  



                                                                      



                                                                      

 

       Chronic        Problem Active               2021               [place]

[facility]an



       kidney                                    22:17:41               l



       disease   Chronic                                                  Donny

n



       (disorder) kidney                                                  



              disease                                                  



              (disorder)                                                  



                                                                      



                                                                      



               Active                                                  



                                                                      



                                                                      



                                                                      



                                                                      



              Problem                                                  



                                                                      



                                                                      



              2021                                                  



                                                                      



                                                                      



                                                                      



                                                                      



                                                                    



              Medical                                                  



              Group,                                                  



              [employer]                                                  



              [facility],                                                  



              [place]                                                  



                                                                      



                                                                      

 

       Chronic        Problem Active               2021               [place]

milan



       kidney                                    22:17:41               l



       disease   Chronic                                                  Donny

n



       stage 3 kidney                                                  



       (disorder) disease                                                  



              stage 3                                                  



              (disorder)                                                  



                                                                      



                                                                      



               Active                                                  



                                                                      



                                                                      



                                                                      



                                                                      



              Problem                                                  



                                                                      



                                                                      



              2021                                                  



                                                                      



                                                                      



                                                                      



                                                                      



                                                                    



              Medical                                                  



              Group,                                                  



              [employer]                                                  



              [place],                                                  



              [place]                                                  



                                                                      



                                                                      

 

       Chronic        Problem Active               2021               [place]

milan



       pain                                      22:17:41               l



       (finding)   Chronic                                                  Herm

daryl



              pain                                                    



              (finding)                                                  



                                                                      



                                                                      



              Active                                                  



                                                                      



                                                                      



                                                                      



                                                                      



              Problem                                                  



                                                                      



                                                                      



              2021                                                  



                                                                      



                                                                      



                                                                      



                                                                      



                                                                    



              Medical                                                  



              Group,                                                  



              [employer]                                                  



              [place],                                                  



              [place]                                                  



                                                                      



                                                                      

 

       Dependence        Problem Active               2021               [facility]

[facility]



       on                                        22:17:41               l



       supplement                                                         Donny

n



       al oxygen Dependence                                                  



       (finding) on                                                      



              supplement                                                  



              al oxygen                                                  



              (finding)                                                  



                                                                      



                                                                      



              Active                                                  



                                                                      



                                                                      



                                                                      



                                                                      



              Problem                                                  



                                                                      



                                                                      



              2021                                                  



                                                                      



                                                                      



                                                                      



                                                                      



                                                                    



              Medical                                                  



              Group,                                                  



              [place]                                                  



                                                                      



                                                                      

 

       Edema of        Problem Active               2021               [facility]

[facility]



       lower                                     22:17:41               l



       extremity   Edema of                                                  Her

[place]



       (finding) lower                                                   



              extremity                                                  



              (finding)                                                  



                                                                      



                                                                      



              Active                                                  



                                                                      



                                                                      



                                                                      



                                                                      



              Problem                                                  



                                                                      



                                                                      



              2021                                                  



                                                                      



                                                                      



                                                                      



                                                                      



                                                                    



              Medical                                                  



              Group,                                                  



              [employer]                                                  



              [place],                                                  



              [place]                                                  



                                                                      



                                                                      

 

       Hyperlipid        Problem Active               2021               [facility]

[facility]



       emia                                      22:17:41               l



       (disorder)                                                         Donny

n



              Hyperlipid                                                  



              emia                                                    



              (disorder)                                                  



                                                                      



                                                                      



               Active                                                  



                                                                      



                                                                      



                                                                      



                                                                      



              Problem                                                  



                                                                      



                                                                      



              2021                                                  



                                                                      



                                                                      



               Data                                                   



              migrated                                                  



              from [facility]                                                  



              on                                                      



              5/30/15.                                                  



                                                                      



                                                                      



               Medical                                                  



              Group,                                                  



              [facility]                                                    



              [place],                                                  



              [facility] [place]                                                  



              Land,[facility]                                                     



              [facility]                                                  



              Trace,[facility]                                                  



              [place]                                                  



              [facility]                                                  



              Joint,                                                  



              [place]                                                  



                                                                      



                                                                      

 

       Hyperparat        Problem Active               2021               M

[facility]oria



       hyroidism                                    22:17:41               l



       (disorder)                                                         Donny

n



              Hyperparat                                                  



              hyroidism                                                  



              (disorder)                                                  



                                                                      



                                                                      



               Active                                                  



                                                                      



                                                                      



                                                                      



                                                                      



              Problem                                                  



                                                                      



                                                                      



              2021                                                  



                                                                      



                                                                      



                                                                      



                                                                      



                                                                    



              Medical                                                  



              Group,                                                  



              [facility]                                                  



              Land,                                                  



              [place]                                                  



                                                                      



                                                                      

 

       Hypertensi        Problem Active               2021               M

emoria



       ve                                        22:17:41               l



       disorder,                                                         [place]ron



       systemic Hypertensi                                                  



       arterial ve                                                      



       (disorder) disorder,                                                  



              systemic                                                  



              arterial                                                  



              (disorder)                                                  



                                                                      



                                                                      



               Active                                                  



                                                                      



                                                                      



                                                                      



                                                                      



              Problem                                                  



                                                                      



                                                                      



              2021                                                  



                                                                      



                                                                      



                                                                      



                                                                      



                                                                    



              Medical                                                  



              Group,[facility]                                                  



              [facility]                                                    



              [facility]                                                  



              [facility]                                                  



              [facility]                                                  



              Land,                                                  



              [facility]                                                  



              Land,                                                  



              [place]                                                  



                                                                      



                                                                      

 

       Hypertensi        Problem Active               2021               M

em[facility]



       ve renal                                    22:17:41               l



       disease                                                         [place]



       (disorder) Hypertensi                                                  



              ve renal                                                  



              disease                                                  



              (disorder)                                                  



                                                                      



                                                                      



               Active                                                  



                                                                      



                                                                      



                                                                      



                                                                      



              Problem                                                  



                                                                      



                                                                      



              2021                                                  



                                                                      



                                                                      



                                                                      



                                                                      



                                                                    



              Medical                                                  



              Group,                                                  



              [facility]                                                  



              Land,                                                  



              [place]                                                  



                                                                      



                                                                      

 

       Hyperurice        Problem Active               2021               M

emoria



       keegan                                       22:17:41               l



       (disorder)                                                         Donny

n



              Hyperurice                                                  



              keegan                                                     



              (disorder)                                                  



                                                                      



                                                                      



               Active                                                  



                                                                      



                                                                      



                                                                      



                                                                      



              Problem                                                  



                                                                      



                                                                      



              2021                                                  



                                                                      



                                                                      



               Data                                                   



              migrated                                                  



              from [facility]                                                  



              on                                                      



              5/30/15.                                                  



                                                                      



                                                                      



               Medical                                                  



              Group,                                                  



              [facility]                                                    



              [place],                                                  



              [facility]                                                  



              Land,                                                  



              [facility]                                                     



              [facility]                                                  



              [facility]                                                  



              [place]                                                  



              [facility]                                                  



              Joint,                                                  



              [place]                                                  



                                                                      



                                                                      

 

       Lymphedema        Problem Active               2021               M

em[facility]



       of lower                                    22:17:41               l



       extremity                                                         [place]



       (disorder) Lymphedema                                                  



              of lower                                                  



              extremity                                                  



              (disorder)                                                  



                                                                      



                                                                      



               Active                                                  



                                                                      



                                                                      



                                                                      



                                                                      



              Problem                                                  



                                                                      



                                                                      



              2021                                                  



                                                                      



                                                                      



                                                                      



                                                                      



                                                                    



              Medical                                                  



              Group,                                                  



              [facility]                                                  



              Land,                                                  



              [place]                                                  



                                                                      



                                                                      

 

       Malignant        Problem Active               2021               Me

moria



       neoplasm                                    22:17:41               l



       of skin of                                                         Donny

n



       upper limb Malignant                                                  



       (disorder) neoplasm                                                  



              of skin of                                                  



              upper limb                                                  



              (disorder)                                                  



                                                                      



                                                                      



               Active                                                  



                                                                      



                                                                      



                                                                      



                                                                      



              Problem                                                  



                                                                      



                                                                      



              2021                                                  



                                                                      



                                                                      



                                                                      



                                                                      



                                                                    



              Medical                                                  



              Group,                                                  



              [facility] [place]                                                  



              Land,                                                  



              [place]                                                  



                                                                      



                                                                      

 

       Morbid        Problem Active               2021               [facility]

ia



       obesity                                    22:17:41               l



       (disorder)   Morbid                                                  Herm

daryl



              obesity                                                  



              (disorder)                                                  



                                                                      



                                                                      



               Active                                                  



                                                                      



                                                                      



                                                                      



                                                                      



              Problem                                                  



                                                                      



                                                                      



              2021                                                  



                                                                      



                                                                      



                                                                      



                                                                      



                                                                    



              Medical                                                  



              Group,                                                  



              [facility]                                                    



              [facility],                                                  



              [facility]                                                  



              Land,                                                  



              [facility]                                                     



              [facility]                                                  



              Trace,[facility]                                                  



              [place]                                                  



              [facility]one &                                                  



              Joint,                                                  



              [place]                                                  



                                                                      



                                                                      

 

       Post-disch        Problem Active               2021               M

emoria



       arge                                      22:17:41               l



       follow-up                                                         [place]



       (finding) Post-disch                                                  



              arge                                                    



              follow-up                                                  



              (finding)                                                  



                                                                      



                                                                      



              Active                                                  



                                                                      



                                                                      



                                                                      



                                                                      



              Problem                                                  



                                                                      



                                                                      



              2021                                                  



                                                                      



                                                                      



                                                                      



                                                                      



                                                                    



              Medical                                                  



              Group,                                                  



              [place]                                                  



                                                                      



                                                                      

 

       Prerenal        Problem Active               2021               Mem

oria



       azotemia                                    22:17:41               l



       (disorder)   Prerenal                                                  He

rmann



              azotemia                                                  



              (disorder)                                                  



                                                                      



                                                                      



               Active                                                  



                                                                      



                                                                      



                                                                      



                                                                      



              Problem                                                  



                                                                      



                                                                      



              2021                                                  



                                                                      



                                                                      



                                                                      



                                                                      



                                                                    



              Medical                                                  



              Group,                                                  



              [facility] [place]                                                  



              Land,                                                  



              [place]                                                  



                                                                      



                                                                      

 

       Proteinuri        Problem Active               2021               M

emoria



       a                                         22:17:41               l



       (finding)                                                         [place]



              Proteinuri                                                  



              a                                                       



              (finding)                                                  



                                                                      



                                                                      



              Active                                                  



                                                                      



                                                                      



                                                                      



                                                                      



              Problem                                                  



                                                                      



                                                                      



              2021                                                  



                                                                      



                                                                      



                                                                      



                                                                      



                                                                    



              Medical                                                  



              Group,                                                  



              [employer]                                                  



              [place],                                                  



              [place]                                                  



                                                                      



                                                                      

 

       Stasis        Problem Active               2021               [facility]

ia



       ulcer                                     22:17:41               l



       (disorder)   Stasis                                                  [place]

daryl



              ulcer                                                   



              (disorder)                                                  



                                                                      



                                                                      



               Active                                                  



                                                                      



                                                                      



                                                                      



                                                                      



              Problem                                                  



                                                                      



                                                                      



              2021                                                  



                                                                      



                                                                      



                                                                      



                                                                      



                                                                    



              Medical                                                  



              Group,                                                  



              [facility]                                                  



              [place],                                                  



              [place]                                                  



                                                                      



                                                                      

 

       Swelling -        Problem Active               2021               [facility]

[facility]



       edema -                                    22:17:41               l



       symptom   Swelling                                                  [place]

nn



       (finding) - edema -                                                  



              symptom                                                  



              (finding)                                                  



                                                                      



                                                                      



              Active                                                  



                                                                      



                                                                      



                                                                      



                                                                      



              Problem                                                  



                                                                      



                                                                      



              2021                                                  



                                                                      



                                                                      



                                                                      



                                                                      



                                                                    



              Medical                                                  



              Group,                                                  



              [facility]                                                  



              [place],                                                  



              [place]                                                  



                                                                      



                                                                      

 

       Swollen        Problem Active               2021               [place]

[place]an



       ankle                                     22:17:41               l



       (finding)   Swollen                                                  [facility]

daryl



              ankle                                                   



              (finding)                                                  



                                                                      



                                                                      



              Active                                                  



                                                                      



                                                                      



                                                                      



                                                                      



              Problem                                                  



                                                                      



                                                                      



              2021                                                  



                                                                      



                                                                      



                                                                      



                                                                      



                                                                    



              Medical                                                  



              Group,                                                  



              [facility]                                                  



              [place],                                                  



              [place]                                                  



                                                                      



                                                                      

 

       Urinary        Problem Resolve               2021               [facility]

[facility]



       tract                d                    22:17:41               l



       infectious   Urinary                                                  Her

[place]



       disease tract                                                   



       (disorder) infectious                                                  



              disease                                                  



              (disorder)                                                  



                                                                      



                                                                      



               Resolved                                                  



                                                                      



                                                                      



                                                                      



                                                                      



                Problem                                                  



                                                                      



                                                                      



              2021                                                  



                                                                      



                                                                      



                                                                      



                                                                      



                                                                    



              Medical                                                  



              Group,                                                  



              [facility]                                                  



              [place],                                                  



              [place]                                                  



                                                                      



                                                                      

 

       Venous        Problem Active               2021               [facility]

ia



       ulcer of                                    22:17:41               l



       leg      Venous                                                  [place]



       (disorder) ulcer of                                                  



              leg                                                     



              (disorder)                                                  



                                                                      



                                                                      



               Active                                                  



                                                                      



                                                                      



                                                                      



                                                                      



              Problem                                                  



                                                                      



                                                                      



              2021                                                  



                                                                      



                                                                      



                                                                      



                                                                      



                                                                    



              Medical                                                  



              Group,                                                  



              [facility]                                                  



              [place],                                                  



              [place]                                                  



                                                                      



                                                                      

 

       Weight        Problem Active               2021               [facility]

ia



       gain                                      22:17:41               l



       finding   Weight                                                  [place]



       (finding) gain                                                    



              finding                                                  



              (finding)                                                  



                                                                      



                                                                      



              Active                                                  



                                                                      



                                                                      



                                                                      



                                                                      



              Problem                                                  



                                                                      



                                                                      



              2021                                                  



                                                                      



                                                                      



                                                                      



                                                                      



                                                                    



              Medical                                                  



              Group,                                                  



              [facility]                                                  



              [place],                                                  



              [place]                                                  



                                                                      



                                                                      

 

       Acute         Problem Active               2016               [facility]

ia



       renal                                     00:23:22               l



       failure   Acute                                                  [place]



       syndrome renal                                                   



       (disorder) failure                                                  



              syndrome                                                  



              (disorder)                                                  



                                                                      



                                                                      



               Active                                                  



                                                                      



                                                                      



                                                                      



                                                                      



              Problem                                                  



                                                                      



                                                                      



              2016                                                  



                                                                      



                                                                      



               Data                                                   



              migrated                                                  



              from                                                   



              [employer]                                                  



              on                                                      



              5/30/15.                                                  



                                                                      



                                                                      



               [facility]                                                  



              [facility],                                                  



              [employer] [employer]                                                  



              [place],[facility]                                                     



              [place]                                                  



              [facility]                                                  



              [place]                                                  



              [facility]                                                  



              [facility]                                                   



                                                                      



                                                                      

 

       Kidney        Problem Active               2017               [facility]

ia



       disease                                    03:07:28               l



       (disorder)   Kidney                                                  [facility]

daryl



              disease                                                  



              (disorder)                                                  



                                                                      



                                                                      



               Active                                                  



                                                                      



                                                                      



                                                                      



                                                                      



              Problem                                                  



                                                                      



                                                                      



              2017                                                  



                                                                      



                                                                      



                                                                      



                                                                      



                                                                    



              [place]                                                  



                                                                      



                                                                      

 

       Migraine        Problem Active               2017               [facility]

[facility]



       (disorder)                                    03:07:28               l



                Migraine                                                  Donny

n



              (disorder)                                                  



                                                                      



                                                                      



               Active                                                  



                                                                      



                                                                      



                                                                      



                                                                      



              Problem                                                  



                                                                      



                                                                      



              2017                                                  



                                                                      



                                                                      



                                                                      



                                                                      



                                                                      



              [facility]                                                  



              [facility]                                                  



              [facility]                                                  



              [facility] [place],                                                  



              [place]                                                  



                                                                      



                                                                      

 

       RIGHT         Diagnosis Active               2016               [facility]

[facility]



       WRIST                                     15:06:00               l



       DISTAL   RIGHT                                                  [place]



       RADIUS WRIST                                                   



       CLSD FX DISTAL                                                  



              RADIUS                                                  



              CLSD FX                                                  



                                                                      



                                                                      



              Active                                                  



                                                                      



                                                                      



                                                                      



                                                                      



                                                                      



                                                                      



                                                                      



                                                                      



                                                                      



                                                                      



                   [facility]                                                  



              [place]                                                  



              [facility]                                                  



              [facility]                                                   



                                                                      



                                                                      

 

       DISTAL        Diagnosis Active               2016               [facility]

[facility]



       RADIUS                                    09:46:00               l



       CLSD FX   DISTAL                                                  [place]



              RADIUS                                                  



              CLSD FX                                                  



                                                                      



                                                                      



              Active                                                  



                                                                      



                                                                      



                                                                      



                                                                      



                                                                      



                                                                      



                                                                      



                                                                      



                                                                      



                                                                      



                   [facility]                                                     



              [place]                                                  



              Trace                                                   



                                                                      



                                                                      

 

       Cholestero        Problem                      2016               [facility]

[facility]



       l                                         05:02:18               l



       (substance                                                         Donny

n



       )      Cholestero                                                  



              l                                                       



              (substance                                                  



              )                                                       



                                                                      



                                                                      



                                                                      



                                                                      



                                                                      



                                                                      



              Problem                                                  



                                                                      



                                                                      



              2016                                                  



                                                                      



                                                                      



                                                                      



                                                                      



                                                                      



              Surgical                                                  



              Specialty                                                  



              [facility]                                                    



                                                                      



                                                                      

 

       Sleep         Problem                      2016               [facility]

ia



       apnea                                     05:02:18               l



       (finding)   Sleep                                                  Donny

n



              apnea                                                   



              (finding)                                                  



                                                                      



                                                                      



                                                                      



                                                                      



                                                                      



                                                                      



                                                                      



              Problem                                                  



                                                                      



                                                                      



              2016                                                  



                                                                      



                                                                      



               2does not                                                  



              use cpap                                                  



                                                                      



                                                                      



              Surgical                                                  



              [facility]                                                    



                                                                      



                                                                      

 

       Acute         Problem Resolve 2016               

[facility]



       otitis               d         00:23:22 00:23:22               l



       media    Acute               00:00:                             [facility]



       (disorder) otitis               00                                 



              media                                                   



              (disorder)                                                  



                                                                      



                                                                      



               Resolved                                                  



                                                                      



                                                                      



              2013                                                  



                                                                      



                                                                      



               Problem                                                  



                                                                      



                                                                      



              2016                                                  



                                                                      



                                                                      



               Data                                                   



              migrated                                                  



              from [facility]                                                  



              on                                                      



              7/18/15.                                                  



                                                                      



                                                                      



               OPID                                                  



              Tessa,                                                  



              [employer]                                                  



              [place],[facility]                                                     



              [place]                                                  



              Trace,[facility]                                                  



              Bone &                                                  



              Joint                                                   



                                                                      



                                                                      







History of Past Illness







       Condition Condition Condition Status Onset  Resolution Last   Treating Co

mments 

Source



       Name   Details Category        Date   Date   Treatment Clinician        



                                                 Date                 

 

       Dependence        Problem        2021            

   [facility]



       on                             01:29:16 01:29:16               l



       supplement                      21:17:                             Donny

n



       al oxygen Dependence               00                                 



              on                                                      



              supplement                                                  



              al oxygen                                                  



                                                                      



                                                                      



                                                                      



                                                                      



                                                                      



              2021                                                  



                                                                      



                                                                      



                                                                      



                                                                      



                                                                    



              Medical                                                  



              Group                                                   



                                                                      



                                                                      

 

       Other         Problem        2021               M

[facility]



       hyperlipid                         01:29:16 01:29:16               l



       emia     Other               21:16:                             [facility]



              hyperlipid               00                                 



              emia                                                    



                                                                      



                                                                      



                                                                      



                                                                      



              2021                                                  



                                                                      



                                                                      



                                                                      



                                                                      



                                                                    



              Medical                                                  



              Group                                                   



                                                                      



                                                                      

 

       Unsteadine        Problem        2021            

   [facility]



       ss on feet                         01:29:16 01:29:16               l



                                   21:13:                             [facility]



              Unsteadine               00                                 



              ss on feet                                                  



                                                                      



                                                                      



                                                                      



                                                                      



                                                                      



              2021                                                  



                                                                      



                                                                      



                                                                      



                                                                      



                                                                    



              Medical                                                  



              Group                                                   



                                                                      



                                                                      

 

       Weakness        Problem        2021              

 [facility]



                                   -   01:29:16 01:29:16               l



                Weakness               21:13:                             Donny

n



                                   00                                 



                                                                      



                                                                      



                                                                      



                                                                      



              2021                                                  



                                                                      



                                                                      



                                                                      



                                                                      



                                                                    



              Medical                                                  



              Group                                                   



                                                                      



                                                                      

 

       Essential        Problem        2021             

  [facility]



       (primary)                         01:29:16 01:29:16               l



       hypertensi                      21:12:                             Donny

n



       on     Essential               00                                 



              (primary)                                                  



              hypertensi                                                  



              on                                                      



                                                                      



                                                                      



                                                                      



                                                                      



              2021                                                  



                                                                      



                                                                      



                                                                      



                                                                      



                                                                    



              Medical                                                  



              Group                                                   



                                                                      



                                                                      

 

       Other long        Problem        2021            

   [facility]



       term                        -   22:39:43 22:39:43               l



       (current)   Other               22:29:                             Donny

n



       drug   long term               00                                 



       therapy (current)                                                  



              drug                                                    



              therapy                                                  



                                                                      



                                                                      



                                                                      



                                                                      



              2021                                                  



                                                                      



                                                                      



                                                                      



                                                                      



                                                                    



              Medical                                                  



              Group                                                   



                                                                      



                                                                      

 

       Other         Problem        2021               M

[facility]



       abnormal                         22:39:43 22:39:43               l



       glucose   Other               22:23:                             [facility]



              abnormal               00                                 



              glucose                                                  



                                                                      



                                                                      



                                                                      



                                                                      



              2021                                                  



                                                                      



                                                                      



                                                                      



                                                                      



                 [facility]                                                   



                                                                      



                                                                      







Allergies, Adverse Reactions, Alerts







       Allergy Allergy Status Severity Reaction(s) Onset  Inactive Treating Comm

ents 

Source



       Name   Type                        Date   Date   Clinician        

 

       penicill penicill Active                                           [facility]

a



       ins<sup> ins<sup>                                                  l



       1</sup> 1</sup>                                                  [facility]

 

       codeine< codeine< Active                                           [facility]

a



       sup>1</s sup>1</s                                                  l



       up>    up>                                                     [facility]

 

       cefepime cefepime Active                                           [facility]

a



                                                                      l



                                                                      [facility]

 

       Levaquin Levaquin Active                                           [facility]

a



                                                                      l



                                                                      [facility]

 

       penicill penicill Active                                           [facility]

a



       ins<sup> ins<sup>                                                  l



       2</sup> 2</sup>                                                  [facility]

 

       codeine codeine Active                                           [facility]



                                                                      l



                                                                      [facility]

 

       penicill penicill Active                                           [facility]

a



       ins    ins                                                     l



                                                                      [facility]







Social History







           Social Habit Start Date Stop Date  Quantity   Comments   Source

 

           Social History 2019-10-25 2019-10-25                       Doreen nguyen



                      14:54:48   14:54:48                         









                Smoking Status  Start Date      Stop Date       Source

 

                Social History                                  Doreen Mart







Medications







       Ordered Filled Start  Stop   Current Ordering Indication Dosage Frequency

 Signature

                    Comments            Components          Source



     Medication Medication Date Date Medication? Clinician                (SIG) 

          



     Name Name                                                   

 

     Mupirocin      2021      Yes                      1 appl,           [facility]

ia



     0.02 MG/MG                                     TOP, TID,           l



     Topical      23:21:                               X 5 day, #           [facility]

daryl



     Ointment      00                                 22 gm, 0           



                                                  Refill(s),           



                                                  Pharmacy:           



                                                  [facility]           



                                                  [facility]           



                                                  #[id number],           



                                                  165.1, cm,           



                                                  21           



                                                  14:08:00           



                                                  CST,           



                                                  Height,           



                                                  136.42,           



                                                  kg,            



                                                  21           



                                                  14:08:00           



                                                  CST,           



                                                  Weight           

 

     bumetanide      2021      Yes                      2 mg = 1           [facility]

[facility]



     2 mg oral      1-19                               tab, PO,           l



     tablet      21:11:                               Daily, #           [facility]



               00                                 30 tab, 0           



                                                  Refill(s)           

 

     allopurinol      2021      Yes                      100 mg = 1           

[facility]



     100 mg oral      1-19                               tab, PO,           l



     tablet      21:10:                               BID, # 60           Don[facility]

n



               00                                 tab, 0           



                                                  Refill(s)           

 

     gabapentin      2021      Yes                      200 mg = 2           M

[facility]



     100 MG Oral      -19                               cap, PO,           l



     Capsule      21:10:                               Bedtime, 0           [facility]

daryl



               00                                 Refill(s)           

 

     QUEtiapine            Yes                      1 tablet,           Me

[facility]



     50 mg oral      3-30                               once a           l



     tablet      13:55:                               day, 0           [facility]



               00                                 Refill(s)           

 

     torsemide            Yes                      1 tablet,           Mem

oria



     20 mg oral      3-30                               twice a           l



     tablet      13:55:                               day, 0           [facility]



               00                                 Refill(s)           

 

     potassium            Yes                      1 tablet,           [facility]

[facility]



     chloride 20      3-30                               once a           l



     mEq oral      13:54:                               day, 0           [facility]



     tablet,      00                                 Refill(s)           



     extended                                                        



     release                                                        



     (KCL)                                                        

 

     allopurinol            Yes                      1/2            [facility]

a



     300 mg oral      3-30                               tablet,           l



     tablet      13:53:                               once a           [facility]



               00                                 day, 0           



                                                  Refill(s)           

 

     carvedilol            Yes                      1 tablet,           Me

[facility]



     3.125 mg      3-30                               twice a           l



     oral tablet      13:53:                               day, 0           [facility]

daryl



               00                                 Refill(s)           

 

     Digoxin            Yes                      1 tablet,           [facility]

ia



     0.125 MG      3-30                               once a           l



     Oral Tablet      13:53:                               day, 0           [facility]

daryl



               00                                 Refill(s)           

 

     DULoxetine            Yes                      1 capsule,           M

emoria



     60 mg oral      3-30                               once a           l



     delayed      13:53:                               day, 0           [facility]



     release      00                                 Refill(s)           



     capsule                                                        

 

     apixaban 5            Yes                      1 tablet,           Me

moria



     MG Oral      3-30                               twice a           l



     Tablet      13:53:                               day, 0           [facility]



     [Eliquis]      00                                 Refill(s)           

 

     gabapentin            Yes                      1 capsule,           M

emoria



     300 MG Oral      3-30                               twice a           l



     Capsule      13:53:                               day, 0           [facility]



               00                                 Refill(s)           

 

     metoprolol            Yes                      1 tablet,           Me

moria



     tartrate 50      3-30                               Twice a           l



     mg oral      13:53:                               day, 0           [facility]



     tablet      00                                 Refill(s)           

 

     midodrine 5            Yes                      1 tablet,           M

emoria



     mg oral      3-30                               twice a           l



     tablet      13:53:                               day, 0           [facility]



               00                                 Refill(s)           

 

     DULoxetine      2020      Yes                      60 mg = 1           Me

moria



     60 mg oral      1-25                               cap, PO,           l



     delayed      17:17:                               Daily, #           Donny

n



     release      00                                 30 cap, 0           



     capsule                                         Refill(s)           

 

     Spironolact      2020      Yes                      25 mg = 1           M

emoria



     one 25 MG      1-25                               tab, PO,           l



     Oral Tablet      17:17:                               Daily, 0           He

rmann



     [Aldactone]      00                                 Refill(s)           

 

     Digoxin      2020      Yes                      0.125 mg,           [facility]

ia



     0.125 MG      1-25                               PO, Daily,           l



     Oral Tablet      17:13:                               # 30 tab,           H

ermann



               00                                 0              



                                                  Refill(s)           

 

     Mupirocin      2020      Yes                      See            [facility]



     0.02 MG/MG      0-13                               Instructio           l



     Topical      15:44:                               ns, APPLY           [facility]

nn



     Ointment      00                                 EXTERNALLY           



                                                  TO THE           



                                                  AFFECTED           



                                                  AREA TWICE           



                                                  DAILY, #           



                                                  66 gm, 1           



                                                  Refill(s),           



                                                  Pharmacy:           



                                                  [facility]           



                                                  [facility]           



                                                  #[id number],           



                                                  170.18,           



                                                  cm,            



                                                  20           



                                                  14:42:00           



                                                  CST,           



                                                  Height,           



                                                  164.318,           



                                                  kg,            



                                                  20           



                                                  14:42:00           



                                                  CST,           



                                                  Weight           

 

     Nitrofurant            Yes                      100 mg = 1           

[facility]



     oin 100 MG      8-09                               cap, PO,           l



     Oral      17:57:                               BID, X 10           [facility]



     Capsule      00                                 day, # 20           



     [Macrobid]                                         cap, 0           



                                                  Refill(s),           



                                                  Pharmacy:           



                                                  [facility]           



                                                  [facility]           



                                                  #[id number],           



                                                  170.18,           



                                                  cm,            



                                                  20           



                                                  14:42:00           



                                                  CST,           



                                                  Height,           



                                                  164.318,           



                                                  kg,            



                                                  20           



                                                  14:42:00           



                                                  CST,           



                                                  Weight           

 

     lisinopril      -      Yes                      2.5 mg = 1           M

emoria



     2.5 mg oral      6-04                               tab, PO,           l



     tablet      23:26:                               Daily, #           [facility]



               00                                 30 tab, 2           



                                                  Refill(s),           



                                                  called to           



                                                  pharmacy           

 

     calcitriol      -0      Yes                      = 1 cap,           Mem

oria



     0.25 mcg      5-20                               PO, Daily,           l



     oral      12:18:                               # 90 cap,           [facility]



     capsule      00                                 1              



                                                  Refill(s),           



                                                  [facility]           



                                                  [facility]           



                                                  #[id number]           

 

     calcitriol      -0      Yes                      = 1 cap,           Mem

oria



     0.25 mcg      4-16                               PO, Daily,           l



     oral      16:37:                               # 90           [facility]



     capsule      47                                 unknown           



                                                  unit,           



                                                  Pharmacy:           



                                                  [facility]           



                                                  #[id number]           

 

     Furosemide      -0      Yes                      = 1 tab,           Mem

oria



     40 MG Oral      4-13                               PO, Every           l



     Tablet      20:06:                               Other Day,           [facility]

nn



               19                                 # 45 tab,           



                                                  Pharmacy:           



                                                  [facility]           



                                                  #[id number]           

 

     prednisolon      -0      Yes                      1 drop in           M

emoria



     e acetate      4-10                               each eye,           l



     10 MG/ML      20:53:                               once           [facility]



     Ophthalmic      00                                 daily, 0           



     Suspension                                         Refill(s)           

 

     bromfenac      -0      Yes                      1 drop in           Mem

oria



     0.7 MG/ML      4-10                               right eye,           l



     Ophthalmic      20:53:                               once           [facility]



     Solution      00                                 daily, 0           



     [Prolensa]                                         Refill(s)           

 

     Furosemide      -0      Yes                      = 1 tab,           Mem

oria



     40 MG Oral      1-23                               PO, Every           l



     Tablet      15:13:                               Other Day,           [facility]

nn



               34                                 # 45 tab,           



                                                  Pharmacy:           



                                                  [facility] [place]           



                                                  #[id number]           

 

     Mupirocin      2019      Yes                      See            [facility]



     0.02 MG/MG      2-27                               Instructio           l



     Topical      19:11:                               ns, # 66           Donny

n



     Ointment      15                                 gm, APPLY           



                                                  EXTERNALLY           



                                                  TO THE           



                                                  AFFECTED           



                                                  AREA TWICE           



                                                  DAILY,           



                                                  Pharmacy:           



                                                  [facility]           



                                                  #[id number]           

 

     Nitrofurant      2019      Yes                      100 mg = 1           

[facility]



     oin 100 MG      2-13                               cap, PO,           l



     Oral      01:44:                               BID, X 5           [facility]



     Capsule      00                                 day, # 10           



     [Macrodanti                                         cap, 0           



     n]                                           Refill(s),           



                                                  Pharmacy:           



                                                  [facility]           



                                                  #[id number]           

 

     apixaban 5      2019      Yes                      5 mg, PO,           Me

moria



     MG Oral      0-25                               Q12H, 0           l



     Tablet      15:07:                               Refill(s)           Donny

n



     [Eliquis]      00                                                

 

     metoprolol      2019      Yes                      50 mg = 1           Me

moria



     tartrate 50      0-25                               tab, PO,           l



     mg oral      15:07:                               BID, # 180           Herm

daryl



     tablet      00                                 tab, 0           



                                                  Refill(s)           

 

     Diltiazem      2019      Yes                      180 mg = 1           Me

moria



     Hydrochlori      0-25                               cap, PO,           l



     de       15:07:                               Daily, #           Herm

daryl



     mg/24 hours      00                                 30 cap, 0           



     oral                                         Refill(s)           



     capsule,                                                        



     extended                                                        



     release                                                        

 

     tramadol      2019      Yes                      150 mg = 1           Mem

oria



     150 mg/24      0-25                               cap, PO,           l



     hours oral      15:07:                               Daily, 0           Her

hernandes



     capsule,      00                                 Refill(s)           



     extended                                                        



     release                                                        

 

     Acetaminoph      2019      Yes                      1 tab, PO,           

[facility]



     en 325 MG /      0-25                               Q6H, 0           l



     Hydrocodone      15:07:                               Refill(s)           H

ermann



     Bitartrate      00                                                



     5 MG Oral                                                        



     Tablet                                                        



     [Norco                                                        



     5/325]                                                        

 

     calcitriol      2019      Yes                      = 1 cap,           Mem

oria



     0.25 mcg      0-11                               PO, Daily,           l



     oral      21:10:                               # 90           [facility]



     capsule      30                                 unknown           



                                                  unit,           



                                                  [facility]           



                                                  [facility]           



                                                  [id number]           

 

     gabapentin            Yes                      300 mg = 1           M

emoria



     300 MG Oral      9-27                               cap, PO,           l



     Capsule      20:54:                               BID, # 180           Herm

daryl



               00                                 cap, 3           



                                                  Refill(s),           



                                                  called to           



                                                  pharmacy           

 

     allopurinol            Yes                      = 1 tab,           Me

moria



     300 mg oral      8-17                               PO, Daily,           l



     tablet      02:39:                               # 90 tab,           Donny

n



               31                                 Pharm[facility]           



                                                  [facility]           



                                                  [id number]           

 

     QUEtiapine            Yes                      50 mg = 1           Me

moria



     50 mg oral      6-06                               tab, PO,           l



     tablet      15:28:                               Bedtime, #           [facility]

nn



               00                                 30 tab, 1           



                                                  Refill(s)           

 

     eletriptan            Yes                      40 mg = 1           Me

moria



     40 mg oral      6-06                               tab, PO,           l



     tablet      15:26:                               Daily, PRN           [facility]

nn



               00                                 for            



                                                  migraine           



                                                  headache,           



                                                  may repeat           



                                                  dose once           



                                                  in 2           



                                                  hours, # 6           



                                                  tab, 0           



                                                  Refill(s)           

 

     atorvastati            Yes                      See            [facility]

a



     n 40 mg      3-14                               Instructio           l



     oral tablet      15:07:                               ns, TAKE 1           

[facility]



               35                                 TABLET BY           



                                                  MOUTH           



                                                  EVERY           



                                                  NIGHT AT           



                                                  BEDTIME, #           



                                                  90 tab, 1           



                                                  Refill(s),           



                                                  Pharmacy:           



                                                  [facility]           



                                                  [facility]           



                                                  [id number]           

 

     amLODIPine            Yes                      See            [facility]



     5 mg oral      3-14                               Instructio           l



     tablet      15:07:                               ns, TAKE 1           [facility]

nn



               33                                 TABLET BY           



                                                  MOUTH           



                                                  EVERY DAY,           



                                                  # 90 tab,           



                                                  1              



                                                  Refill(s),           



                                                  Pharmacy:           



                                                  [facility]           



                                                  [facility]           



                                                  [id number]           

 

     lisinopril            Yes                      See            [facility]



     5 mg oral      8-21                               Instructio           l



     tablet      19:00:                               ns, TAKE 1           [facility]

nn



               30                                 TABLET BY           



                                                  MOUTH           



                                                  DAILY, #           



                                                  90 tab, 1           



                                                  Refill(s),           



                                                  Pharmacy:           



                                                  [facility]           



                                                  [facility]           



                                                  [id number]           

 

     atorvastati            Yes                      See            [facility]

a



     n 40 mg      8-21                               Instructio           l



     oral tablet      18:50:                               ns, TAKE 1           

[facility]



               45                                 TABLET BY           



                                                  MOUTH           



                                                  EVERY           



                                                  NIGHT AT           



                                                  BEDTIME, #           



                                                  30 tab, 5           



                                                  Refill(s),           



                                                  Pharmacy:           



                                                  [facility]           



                                                  [facility]           



                                                  [id number]           

 

     amLODIPine            Yes                      See            [facility]



     5 mg oral      8-21                               Instructio           l



     tablet      18:50:                               ns, TAKE 1           [facility]eredith

nn



               41                                 TABLET BY           



                                                  MOUTH           



                                                  EVERY DAY,           



                                                  # 30 tab,           



                                                  5              



                                                  Refill(s),           



                                                  Pharmacy:           



                                                  [facility]           



                                                  [facility]           



                                                  [id number]           

 

     lisinopril            No                       See            [facility]



     5 mg oral      7-31                               Instructio           l



     tablet      15:28:                               ns, # 90           [facility]



               34                                 tab, TAKE           



                                                  1 TABLET           



                                                  BY MOUTH           



                                                  DAILY,           



                                                  Pharmacy:           



                                                  [facility]           



                                                  [facility]           



                                                  [id number]           

 

     allopurinol            No                       300 mg = 1           

[facility]



     300 mg oral      5-10                               tab, PO,           l



     tablet      13:59:                               Daily, #           [facility]



               00                                 90 tab, 1           



                                                  Refill(s),           



                                                  Pharmacy:           



                                                  [facility]           



                                                  [facility]           



                                                  [id number]           

 

     lisinopril            No                       See            [facility]



     5 mg oral      5-01                               Instructio           l



     tablet      12:38:                               ns, # 90           [facility]



               18                                 tab, TAKE           



                                                  1 TABLET           



                                                  BY MOUTH           



                                                  DAILY,           



                                                  Pharmacy:           



                                                  [facility]           



                                                  [facility]           



                                                  [id number]           

 

     ALLOPURINOL            Yes                      See            [facility]

a



     300MG      5-01                               Instructio           l



     TABLETS      12:38:                               ns, # 90           Donny

n



               18                                 tab, TAKE           



                                                  1 TABLET           



                                                  BY MOUTH           



                                                  DAILY,           



                                                  Pharmacy:           



                                                  [facility]           



                                                  [facility]           



                                                  [facility]           

 

     calcitriol            Yes                      0.25           [facility]



     0.25 mcg      3-28                               microgram           l



     oral      13:49:                               = 1 cap,           [facility]



     capsule      00                                 PO, Daily,           



                                                  # 90 cap,           



                                                  3              



                                                  Refill(s),           



                                                  Pharmacy:           



                                                  [facility]           



                                                  [facility]           



                                                  [facility]           

 

     Mupirocin            Yes                      1 appl,           [facility]

ia



     0.02 MG/MG      3-21                               TOP, BID,           l



     Topical      15:37:                               30 grams           Donny

n



     Ointment      21                                 3each, # 3           



                                                  ea, 1           



                                                  Refill(s),           



                                                  Pharmacy:           



                                                  [facility]           



                                                  [facility]           



                                                  [facility]           

 

     atorvastati            Yes                      See            [facility]

a



     n 40 mg      3-21                               Instructio           l



     oral tablet      15:36:                               ns, TAKE 1           

[facility]



               22                                 TABLET BY           



                                                  MOUTH           



                                                  EVERY           



                                                  NIGHT AT           



                                                  BEDTIME, #           



                                                  30 tab, 5           



                                                  Refill(s),           



                                                  Pharmacy:           



                                                  [facility]           



                                                  D[facility] Store           



                                                  [facility]           

 

     amLODIPine            Yes                      See            [facility]



     5 mg oral      3-21                               Instructio           l



     tablet      15:36:                               ns, TAKE 1           [facility]

nn



               18                                 TABLET BY           



                                                  MOUTH           



                                                  EVERY DAY,           



                                                  # 30 tab,           



                                                  5              



                                                  Refill(s),           



                                                  Pharmacy:           



                                                  [facility]           



                                                  D[facility]           



                                                  [facility]           

 

     aspirin 81            Yes                      81 mg = 1           Me

[facility]



     mg tablet,      1-24                               tab, PO,           l



     enteric      16:34:                               Daily, #           [facility]

n



     coated      00                                 90 tab, 3           



                                                  Refill(s)           

 

     Xopenex            No   Ghassan K                0.63 mg =           Mem

oria



     0.63 mg/3      3-11           Chun                3 mL,           l



     mL        01:00:                               Soln, NEB,           [facility]



     inhalation      00                                 Once,           



     solution                                         first dose           



                                                  03/10/16           



                                                  19:00:00           



                                                  CST, stop           



                                                  date           



                                                  03/10/16           



                                                  19:00:00           



                                                  CST            

 

     Misc            No   Deuce                300 mL,           [facility]



     Medication      3-11           Wheat                Soln-IV,           l



               00:03:                               IV, Once,           [facility]



               00                                 first dose           



                                                  03/10/16           



                                                  18:03:00           



                                                  CST, stop           



                                                  date           



                                                  03/10/16           



                                                  18:03:00           



                                                  CST            

 

     promethazin            No   Ghassan K                12.5 mg =          

 [facility]



     e         3-11           Chun                0.5 mL,           l



               00:02:                               Injection,           [facility]



               00                                 IM, Once           



                                                  PRN for           



                                                  severe           



                                                  nausea,           



                                                  first dose           



                                                  03/10/16           



                                                  18:02:00           



                                                  CST            

 

     albuterol            No   Ghassan K                2.5 mg = 3           

[facility]



     2.5 mg/3 mL      3-11           Chun                mL, Soln,           

l



     (0.083%)      00:02:                               NEB, Once           [facility]

daryl



     inhalation      00                                 PRN for           



     solution                                         wheezing,           



                                                  first dose           



                                                  03/10/16           



                                                  18:02:00           



                                                  CST            

 

     Demerol HCl            No   Ghassan K                12.5 mg =          

 [facility]



               3-11           Chun                0.25 mL,           l



               00:02:                               Injection,           [facility]



               00                                 IV Push,           



                                                  Once PRN           



                                                  for            



                                                  shivers,           



                                                  first dose           



                                                  03/10/16           



                                                  18:02:00           



                                                  CST            

 

     ondansetron            No   Ghassan K                4 mg = 2           

[facility]



               3-11           Chun                mL,            l



               00:02:                               Injection,           [facility]



               00                                 IV Push,           



                                                  q15min PRN           



                                                  for            



                                                  nausea/vom           



                                                  iting,           



                                                  order           



                                                  duration:           



                                                  2 doses,           



                                                  first dose           



                                                  03/10/16           



                                                  18:02:00           



                                                  CST, stop           



                                                  date           



                                                  Limited #           



                                                  of times           

 

     Dilaudid            No   Ghassan K                0.5 mg =           Mem

oria



               3-11           Chun                0.25 mL,           l



               00:02:                               Injection,           [facility]



               00                                 IV Push,           



                                                  q10min PRN           



                                                  for pain           



                                                  severe           



                                                  (7-10),           



                                                  first dose           



                                                  03/10/16           



                                                  18:02:00           



                                                  CST            

 

     diphenhydrA            No   Ghassan K                25 mg =           M

[facility]



     MINE      3-11           Chun                0.5 mL,           l



               00:02:                               Injection,           [facility]



               00                                 IV Push,           



                                                  Once PRN           



                                                  for            



                                                  itching,           



                                                  first dose           



                                                  03/10/16           



                                                  18:02:00           



                                                  CST            

 

     LR 1,000 mL            No   Ghassan K                1,000 mL,          

 [facility]



               3-11           Chun                IV, 75           l



               00:02:                               mL/hr,           [facility]



               00                                 start date           



                                                  03/10/16           



                                                  18:02:00           



                                                  CST            

 

     Saline Lock            No   Ghassan K                10 mL,           Me

[facility]



     Flush      3-11           Chun                Soln, IV           l



               00:02:                               Push, As           [facility]



               00                                 Indicated           



                                                  PRN for           



                                                  flush,           



                                                  first dose           



                                                  03/10/16           



                                                  18:02:00           



                                                  CST            

 

     Bupivacaine            No   Ghassan K                300 mL,           M

[facility]



     0.25% 300      3-11           Chun                Nerve           l



     mL pump 300      00:02:                               Block, 5           He

rmann



     mL        00                                 mL/hr,           



                                                  start date           



                                                  03/10/16           



                                                  18:02:00           



                                                  CST            

 

     Misc            No   Deuce                1,000 mL,           [facility]

a



     Medication      3-10           Wheat                Soln-IV,           l



               23:42:                               IV, Once,           [facility]



               00                                 first dose           



                                                  03/10/16           



                                                  17:42:00           



                                                  CST, stop           



                                                  date           



                                                  03/10/16           



                                                  17:42:00           



                                                  CST            

 

     fentaNYL            No   Deuce                25 mcg =           Mem

oria



               3-10           [facility]                0.5 mL,           l



               23:20:                               Injection,           [facility]



               00                                 IV, Once,           



                                                  first dose           



                                                  03/10/16           



                                                  17:20:00           



                                                  CST, stop           



                                                  date           



                                                  03/10/16           



                                                  17:20:00           



                                                  CST            

 

     ondansetron            No   Deuce                4 mg = 2           

[facility]



               3-10           Wheat                mL,            l



               23:03:                               Injection,           [facility]



               00                                 IV, Once,           



                                                  first dose           



                                                  03/10/16           



                                                  17:03:00           



                                                  CST, stop           



                                                  date           



                                                  03/10/16           



                                                  17:03:00           



                                                  CST            

 

     fentaNYL            No   Deuce                25 mcg =           Mem

oria



               3-10           Wheat                0.5 mL,           l



               23:00:                               Injection,           [facility]



               00                                 IV, Once,           



                                                  first dose           



                                                  03/10/16           



                                                  17:00:00           



                                                  CST, stop           



                                                  date           



                                                  03/10/16           



                                                  17:00:00           



                                                  CST            

 

     fentaNYL            No   Deuce                25 mcg =           Mem

[facility]



               3-10           Wheat                0.5 mL,           l



               22:48:                               Injection,           [facility]



               00                                 IV, Once,           



                                                  first dose           



                                                  03/10/16           



                                                  16:48:00           



                                                  CST, stop           



                                                  date           



                                                  03/10/16           



                                                  16:48:00           



                                                  CST            

 

     fentaNYL            No   Duece                25 mcg =           Mem

oria



               3-10           [facility]                0.5 mL,           l



               22:37:                               Injection,           [facility]



               00                                 IV, Once,           



                                                  first dose           



                                                  03/10/16           



                                                  16:37:00           



                                                  CST, stop           



                                                  date           



                                                  03/10/16           



                                                  16:37:00           



                                                  CST            

 

     dexamethaso            No   Deuce                8 mg = 2           

[facility]



     ne        3-10           [facility]                mL,            l



               22:23:                               Injection,           [facility]



               00                                 IV, Once,           



                                                  first dose           



                                                  03/10/16           



                                                  16:23:00           



                                                  CST, stop           



                                                  date           



                                                  03/10/16           



                                                  16:23:00           



                                                  CST            

 

     Misc            No   Deuce                1,000 mL,           [facility]

a



     Medication      3-10           [facility]                Soln-IV,           l



               22:21:                               IV, Once,           [facility]



               00                                 first dose           



                                                  03/10/16           



                                                  16:21:00           



                                                  CST, stop           



                                                  date           



                                                  03/10/16           



                                                  16:21:00           



                                                  CST            

 

     clindamycin      -      Yes  Deuce                928.125           M

[facility]



               3-10           [facility]                mg,            l



               22:18:                               Soln-IV,           [facility]



               00                                 IV, Once,           



                                                  first dose           



                                                  03/10/16           



                                                  16:18:00           



                                                  CST, stop           



                                                  date           



                                                  03/10/16           



                                                  16:18:00           



                                                  CST            

 

     fentaNYL            No   Deuce                25 mcg =           Mem

[facility]



               3-10           [facility]                0.5 mL,           l



               22:05:                               Injection,           [facility]



               00                                 IV, Once,           



                                                  first dose           



                                                  03/10/16           



                                                  16:05:00           



                                                  CST, stop           



                                                  date           



                                                  03/10/16           



                                                  16:05:00           



                                                  CST            

 

     midazolam            No   Deuce                0.5 mg =           Me

[facility]



               3-10           Wheat                0.5 mL,           l



               22:05:                               Injection,           [facility]



               00                                 IV, Once,           



                                                  first dose           



                                                  03/10/16           



                                                  16:05:00           



                                                  CST, stop           



                                                  date           



                                                  03/10/16           



                                                  16:05:00           



                                                  CST            

 

     lidocaine            No   Deuce                3 mL,           [facility]

ia



               3-10           Wheat                Injection,           l



               21:58:                               IV, Once,           [facility]



               00                                 first dose           



                                                  03/10/16           



                                                  15:58:00           



                                                  CST, stop           



                                                  date           



                                                  03/10/16           



                                                  15:58:00           



                                                  CST            

 

     propofol            No   Deuce                120 mg =           [facility]

[facility]



               3-10           Wheat                12 mL,           l



               21:58:                               Emulsion,           [facility]



               00                                 IV, Once,           



                                                  first dose           



                                                  03/10/16           



                                                  15:58:00           



                                                  CST, stop           



                                                  date           



                                                  03/10/16           



                                                  15:58:00           



                                                  CST            

 

     midazolam            No   Deuce                0.5 mg =           [facility]

[facility]



               3-10           Wheat                0.5 mL,           l



               21:50:                               Injection,           [facility]



               00                                 IV, Once,           



                                                  first dose           



                                                  03/10/16           



                                                  15:50:00           



                                                  CST, stop           



                                                  date           



                                                  03/10/16           



                                                  15:50:00           



                                                  CST            

 

     fentaNYL            No   Deuce                25 mcg =           [facility]

[facility]



               3-10           Wheat                0.5 mL,           l



               21:50:                               Injection,           [facility]



               00                                 IV, Once,           



                                                  first dose           



                                                  03/10/16           



                                                  15:50:00           



                                                  CST, stop           



                                                  date           



                                                  03/10/16           



                                                  15:50:00           



                                                  CST            

 

     midazolam            No   Deuce                0.5 mg =           [facility]

[facility]



               3-10           Wheat                0.5 mL,           l



               21:10:                               Injection,           [facility]



               00                                 IV, Once,           



                                                  first dose           



                                                  03/10/16           



                                                  15:10:00           



                                                  CST, stop           



                                                  date           



                                                  03/10/16           



                                                  15:10:00           



                                                  CST            

 

     fentaNYL            No   Deuce                25 mcg =           [facility]

[facility]



               3-10           Wheat                0.5 mL,           l



               21:10:                               Injection,           [facility]



               00                                 IV, Once,           



                                                  first dose           



                                                  03/10/16           



                                                  15:10:00           



                                                  CST, stop           



                                                  date           



                                                  03/10/16           



                                                  15:10:00           



                                                  CST            

 

     fentaNYL            No   Deuce                25 mcg =           [facility]

[facility]



               3-10           Wheat                0.5 mL,           l



               21:05:                               Injection,           [facility]



               00                                 IV, Once,           



                                                  first dose           



                                                  03/10/16           



                                                  15:05:00           



                                                  CST, stop           



                                                  date           



                                                  03/10/16           



                                                  15:05:00           



                                                  CST            

 

     midazolam            No   Deuce                0.5 mg =           [facility]

[facility]



               3-10           Wheat                0.5 mL,           l



               21:05:                               Injection,           [facility]



               00                                 IV, Once,           



                                                  first dose           



                                                  03/10/16           



                                                  15:05:00           



                                                  CST, stop           



                                                  date           



                                                  03/10/16           



                                                  15:05:00           



                                                  CST            

 

     clindamycin            No   Yoan                900 mg, IV        

   [facility]



               3-10           Lei                Piggyback,           l



               20:00:                               Once,           [facility]



               00                                 infuse           



                                                  over 30           



                                                  minutes,           



                                                  first dose           



                                                  03/10/16           



                                                  14:00:00           



                                                  CST, stop           



                                                  date           



                                                  03/10/16           



                                                  14:00:00           



                                                  CST,           



                                                  Prophylaxi           



                                                  s              

 

     LR 1,000 mL            No   Ghassan K                1,000 mL,          

 [facility]



               3-10           Chun                IV, 30           l



               19:27:                               mL/hr,           [facility]



               00                                 start date           



                                                  03/10/16           



                                                  13:27:00           



                                                  CST            

 

     Lidocaine            No   Ghassan K                0.2 mL,           [facility]

[facility]



     2% 0.2 mL      3-10           Chun                Injection,           l



     IV Start      19:27:                               Subcutaneo           [facility]

[facility]



     [[facility]]      00                                 us, Once           



                                                  PRN for           



                                                  other (see           



                                                  comment),           



                                                  first dose           



                                                  03/10/16           



                                                  13:27:00           



                                                  CST            

 

     Seroquel            Yes                      100 mg,           [facility]

a



               3-09                               Oral,           l



               14:40:                               Daily, 0           [facility]



               00                                 Refill(s),           



                                                  migraines           

 

     acetaminoph            Yes                      1 tabs,           [facility]

[facility]



     en-HYDROcod      3-09                               Oral,           l



     one 325      14:40:                               q6hr, 0           [facility]



     mg-5 mg      00                                 Refill(s),           



     oral tablet                                         pain           

 

     traMADol 50            Yes                      50 mg = 1           [facility]

[facility]



     mg oral      3-                               tabs,           l



     tablet      14:40:                               Oral,           [facility]



               00                                 q4hr, 0           



                                                  Refill(s),           



                                                  pain           

 

     amLODIPine-            Yes                      1 tabs,           [facility]

[facility]



     atorvastati      3-09                               Oral, qHS,           l



     n 5 mg-40      14:40:                               0              [facility]



     mg oral      00                                 Refill(s),           



     tablet                                         cholestero           



                                                  l/hyperten           



                                                  alexx           

 

     Osteo            Yes                      Oral,           [facility]



     Bi-Flex      3-09                               Daily, 0           l



               14:40:                               Refill(s),           [facility]



               00                                 supplement           

 

     Nature's            Yes                      1,000 mg =           [facility]

[facility]



     Bounty Red      3-09                               2 caps,           l



     Krill Oil      14:40:                               Oral, BID,           [facility]

[facility]



     500 mg oral      00                                 0              



     capsule                                         Refill(s),           



                                                  supplement           

 

     aspirin 81            Yes                      81 mg = 1           [facility]

[facility]



     mg oral      3-09                               tabs,           l



     tablet      14:40:                               Oral,           [facility]



               00                                 Daily, 0           



                                                  Refill(s),           



                                                  supplement           

 

     Axert 12.5            Yes                      12.5 mg =           [facility]

[facility]



     mg oral      3-09                               1 tabs,           l



     tablet      14:40:                               Oral,           [facility]



               00                                 Once, PRN           



                                                  for            



                                                  migraine           



                                                  headache,           



                                                  may repeat           



                                                  dose once           



                                                  in 2           



                                                  hours, # 6           



                                                  tabs, 0           



                                                  Refill(s),           



                                                  migrainesm           



                                                  ay repeat           



                                                  dose once           



                                                  in 2 hours           

 

     Wellbutrin            Yes                      300 mg,           [facility]

[facility]



     XL        3-09                               Oral,           l



               14:40:                               q24hr, 0           [facility]



               00                                 Refill(s),           



                                                  migraines           







Immunizations







           Ordered Immunization Filled Immunization Date       Status     Commen

ts   Source



           Name       Name                                        

 

           Hx influenza            2019-10-23 Completed             [facility]



           vaccine-unspecified<            00:00:00                         [facility]

[facility]



           sup>1</sup>                                             

 

           pneumococcal            2019 Completed             [facility]



           23-valent             00:00:00                         [facility]



           vaccine<sup>3</sup>                                             

 

           Hx influenza            2011-10-25 Completed             [facility]



           vaccine-unspecified<            14:42:42                         [facility]

daryl



           sup>1</sup>                                             

 

           Hx influenza            2011-10-25 Completed             [facility]



           vaccine-unspecified<            14:42:42                         [facility]

daryl



           sup>2</sup>                                             







Vital Signs







             Vital Name   Observation Time Observation Value Comments     Source

 

             Heart Rate   2021 20:12:00                           [facility]

 [facility]

 

             Heart Rate   2021 20:08:00                           [facility]

 [facility]

 

             Systolic (mm Hg) 2021 20:08:00                           [facility]

[facility]

 

             Diastolic (mm Hg) 2021 20:08:00                           [facility]

[facility]

 

             Height       2021 20:08:00 165.1 cm                  [facility]

 [facility]

 

             Weight       2021 20:08:00                           [facility]

 [facility]

 

             BMI Calculated 2021 20:08:00                           [facility]

al [facility]

 

             Systolic (mm Hg) 2020 15:59:00                           [facility]

[facility]

 

             Diastolic (mm Hg) 2020 15:59:00                           [facility]

[facility]

 

             Heart Rate   2020 15:59:00                           [facility]

 [facility]

 

             Height       2020 15:59:00 165.1 cm                  [facility]

 [facility]

 

             Weight       2020 15:59:00                           [facility]

 [facility]

 

             BMI Calculated 2020 15:59:00                           [facility]

al [facility]

 

             Systolic (mm Hg) 2020 20:42:00                           [facility]

[facility]

 

             Diastolic (mm Hg) 2020 20:42:00                           [facility]

[facility]

 

             Heart Rate   2020 20:42:00                           [facility]

 [facility]

 

             Temperature Oral (F) 2020 20:42:00 98.2 F                    

[facility]

 

             Height       2020 20:42:00 170.18 cm                 [facility]

 [facility]

 

             Weight       2020 20:42:00                           [facility]

 [facility]

 

             BMI Calculated 2020 20:42:00                           [facility]

al [facility]

 

             Systolic (mm Hg) 2019 21:53:00                           [facility]

[facility]

 

             Diastolic (mm Hg) 2019 21:53:00                           [facility]

[facility]

 

             Heart Rate   2019 21:53:00                           [facility]

 [facility]

 

             Temperature Oral (F) 2019 21:53:00 97.9 F                    

[facility]

 

             Height       2019 21:53:00 165.1 cm                  [facility]

 [facility]

 

             Weight       2019 21:53:00                           [facility]

 [facility]

 

             BMI Calculated 2019 21:53:00                           [facility]

al [facility]

 

             Height       2019-10-25 14:54:00 165.1 cm                  [facility]

 [facility]

 

             Weight       2019-10-25 14:54:00                           [facility]

 [facility]

 

             BMI Calculated 2019-10-25 14:54:00                           [facility]

al [facility]

 

             Systolic (mm Hg) 2019-10-25 14:54:00                           [facility]

[facility]

 

             Diastolic (mm Hg) 2019-10-25 14:54:00                           [facility]

[facility]

 

             Heart Rate   2019-10-25 14:54:00                           [facility]

 [facility]

 

             Temperature Oral (F) 2019-10-25 14:54:00 97.6 F                    

[facility]

 

             Systolic (mm Hg) 2019-10-10 15:37:00                           [facility]

[facility]

 

             Diastolic (mm Hg) 2019-10-10 15:37:00                           [facility]

[facility]

 

             Heart Rate   2019-10-10 15:37:00                           [facility]

 [facility]

 

             Temperature Oral (F) 2019-10-10 15:37:00 98.2 F                    

[facility]

 

             Height       2019-10-10 15:37:00 165.1 cm                  [facility]

 [facility]

 

             Weight       2019-10-10 15:37:00                           [facility]

 [facility]

 

             BMI Calculated 2019-10-10 15:37:00                           [facility]

al [facility]

 

             Weight       2019 15:18:00                           [facility]

 [facility]

 

             BMI Calculated 2019 15:18:00                           [facility]

al [facility]

 

             Height       2019 15:18:00 165.1 cm                  [facility]

 [facility]

 

             Temperature Oral (F) 2019 15:18:00 98.4 F                    

[facility]

 

             Heart Rate   2019 15:18:00                           [facility]

 [facility]

 

             Systolic (mm Hg) 2019 15:18:00                           [facility]

[facility]

 

             Diastolic (mm Hg) 2019 15:18:00                           [facility]

[facility]

 

             Height       2019 19:16:00 165.1 cm                  [facility]

 [facility]

 

             BMI Calculated 2019 19:16:00                           [facility]

al [facility]

 

             Weight       2019 19:16:00                           [facility]

 [facility]

 

             Heart Rate   2019 19:16:00                           [facility]

 [facility]

 

             Systolic (mm Hg) 2019 19:16:00                           [facility]

[facility]

 

             Diastolic (mm Hg) 2019 19:16:00                           [facility]

[facility]

 

             Height       2019 14:26:00 167.01 cm                 [facility]

 [facility]

 

             BMI Calculated 2019 14:26:00                           [facility]

al [facility]

 

             Weight       2019 14:26:00                           [facility]

 [facility]

 

             Heart Rate   2019 14:26:00                           [facility]

 [facility]

 

             Temperature Oral (F) 2019 14:26:00 97.9 F                    

[facility]

 

             Systolic (mm Hg) 2019 14:26:00                           [facility]

[facility]

 

             Diastolic (mm Hg) 2019 14:26:00                           [facility]

[facility]

 

             Systolic (mm Hg) 2018 18:33:00                           [facility]

[facility]

 

             Diastolic (mm Hg) 2018 18:33:00                           [facility]

[facility]

 

             Heart Rate   2018 18:33:00                           [facility]

 [facility]

 

             Weight       2018 18:33:00                           [facility]

 [facility]

 

             BMI Calculated 2018 18:31:00                           [facility]

al [facility]

 

             Weight       2018 18:31:00                           [facility]

 [facility]

 

             Systolic (mm Hg) 2018 18:31:00                           [facility]

[facility]

 

             Diastolic (mm Hg) 2018 18:31:00                           [facility]

[facility]

 

             Height       2018 18:31:00 170.18 cm                 [facility]

 [facility]

 

             Temperature Oral (F) 2018 18:31:00 98.3 F                    

[facility]

 

             Heart Rate   2018 18:31:00                           [facility]

 [facility]

 

             Weight       2018 16:14:00                           [facility]

 [facility]

 

             Height       2018 16:14:00 170.18 cm                 [facility]

 [facility]

 

             BMI Calculated 2018 16:14:00                           [facility]

al [facility]

 

             Heart Rate   2018 16:14:00                           [facility]

 [facility]

 

             Systolic (mm Hg) 2018 16:14:00                           [facility]

[facility]

 

             Diastolic (mm Hg) 2018 16:14:00                           [facility]

[facility]

 

             BMI Calculated 2018 15:06:00                           [facility]

al [facility]

 

             Height       2018 15:06:00 170.18 cm                 [facility]

 [facility]

 

             Weight       2018 15:06:00                           [facility]

 [facility]

 

             Systolic (mm Hg) 2018 15:06:00                           [facility]

[facility]

 

             Diastolic (mm Hg) 2018 15:06:00                           [facility]

[facility]

 

             Heart Rate   2018 15:06:00                           [facility]

 [facility]

 

             Temperature Oral (F) 2018 15:06:00 97.9 F                    

[facility]

 

             Weight       2017 16:17:00                           [facility]

 [facility]

 

             Height       2017 16:17:00 170.18 cm                 [facility]

 [facility]

 

             BMI Calculated 2017 16:17:00                           [facility]

al [facility]

 

             Respitory Rate 2016 01:25:00                           [facility]

al [facility]

 

             Systolic (mm Hg) 2016 00:50:00                           [facility]

[facility]

 

             Respitory Rate 2016 00:50:00                           [facility]

al [facility]

 

             Heart Rate   2016 00:50:00                           [facility]

 [facility]

 

             Systolic (mm Hg) 2016 00:40:00                           [facility]

[facility]

 

             Respitory Rate 2016 00:40:00                           [facility]

al [facility]

 

             Heart Rate   2016 00:40:00                           [facility]

 [facility]

 

             Heart Rate   2016 00:30:00                           [facility]

 [facility]

 

             Systolic (mm Hg) 2016 00:30:00                           [facility]

rial [place]

 

             Temperature Oral (F) 2016-03-10 23:50:00 37.1 Sherlyn                  

[facility]

 

             Height       2016-03-10 19:23:00 169 cm                    [facility]

 [facility]

 

             Weight       2016-03-10 19:23:00                           [facility]

 [place]

 

             Temperature Oral (F) 2016-03-10 19:23:00 36.6 Sherlyn                  

[facility]

 

             Height       2016 14:13:00 170 cm                    [facility]

 [place]

 

             Weight       2016 14:13:00                           [facility]

 [facility]







Procedures







                Procedure       Date / Time Performed Performing Clinician [facility]

e

 

                Diabetic retinal eye 2020 06:00:00                 Dillan Mart



                exam<sup>1</sup>                                 

 

                Mammogram       2017-07-15 05:00:00                 [facility]

 

                Colonoscopy<sup>2</sup> 2017 06:00:00                 Hakeem

rial [place]

 

                OPEN REDUCTION INTERNAL 2016-03-10 22:13:00 Yoan Lei [place]



                FIXATION RADIUS                                 



                INTRA-ARTICULAR W/3                                 



                FRAGMENTS 79698                                 



                (Right)<sup>1</sup>                                 

 

                Repair of       2016-03-10 06:00:00                 [facility]



                wrist<sup>1</sup>                                 

 

                skin cancer removed from 2012 00:00:00                 [facility]

[facility]



                arm                                             

 

                Skin cancer of                                  [facility]



                arm<sup>2</sup>                                 

 

                Procedure<sup>2</sup>                                 [facility]

[facility]

 

                Tubal ligation                                  [facility]

 

                Eye operation                                   [facility]

 

                Biopsy of breast                                 [facility]

n

 

                bilateral radial                                 [facility]

n



                kerototomy                                      

 

                bilateral tubal ligation                                 [facility]

l [facility]

 

                colonoscopy                                     [facility]







Encounters







        Start   End     Encounter Admission Attending Care    Care    Encounter 

Source



        Date/Time Date/Time Type    Type    Clinicians Facility Department ID   

   

 

        2022 Outpatient         EKERUO, [facility]     [id number]

[address] [place]



        00:00:00 00:00:00                 DAYLIN                  779     Method

i



                                                                        

 

        2022 Inpatient         SOLIPURAM, [facility]     064     [id number]

81632 [place]



        00:00:00 00:00:00                 MELISSA                    492     Method

i



                                                                        st

 

        2022 Outpatient         [facility],  [facility]     [id number]

[address] [place]



        00:00:00 00:00:00                 RAZIUDDIN                 169     Meth

farhana



                                                                        st

 

        2022 Outpatient         AHMED,  [facility]     [id number]

[address] [place]



        00:00:00 00:00:00                 RAZIUDDIN                 979     Meth

farhana



                                                                        st

 

        2021 Outpatient         AHMED,  [facility]     [id number]

[address] [place]



        00:00:00 00:00:00                 RAZIUDDIN                 834     Meth

farhana



                                                                        st

 

        2021 Between                 nullFlavo [facility] 3467

[id number] [facility]



        14:18:24 14:18:24 Visit                   r       Medicine 62      l



                                                        Rudy martinez

 

        2021 Between                 [facility]Flavo [facility] 3467

[id number] [facility]



        00:56:47 00:56:47 Visit                   r       Medicine 61      l



                                                        Rudy martinez

 

        2021 Outpatient                 nullFlavo [facility] 3

[id number] Mem[facility]



        20:00:00 05:59:59                         r       Medicine 52      l



                                                        Rudy martinez

 

        2021 Phone                   nullFlavo [facility] 3467

[id number] Mem[facility]



        21:38:38 05:59:59 Message                 r       Medicine 13      l



                                                        Rudy         Donny

n

 

        2021 Outpatient         EKERUO, [facility]     [id number]

[address] [place]



        00:00:00 00:00:00                 DAYLIN                  513     Method

i



                                                                        

 

        2021-11-15 2021 Between                 nullFlavo [facility] 3467

[id number] Memoria



        15:33:59 15:33:59 Visit                   r       Medicine 59      l



                                                        Grant         Donny

n

 

        2021 2021-10-05 Inpatient         SOLIPURAM, [facility]     074     [id number]

37638 [place]



        00:00:00 00:00:00                 MELISSA                    329     Method

i



                                                                        

 

        2021 Outpatient         OPPERMANN, [facility]     2100

[id number] [place]



        00:00:00 00:00:00                 JULIANNA                 104     Method

i



                                                                        

 

        2021 Outpatient         AHMED,  [facility]     [id number]

[address] [place]



        00:00:00 00:00:00                 RAZIUDDIN                 273     Meth

farhana



                                                                        

 

        2021 Outpatient         EKERUO, [facility]     [id number]

[address] [place]



        00:00:00 00:00:00                 DAYLIN                  787     Method

i



                                                                        

 

        2021 Outpatient         DAOURA, [facility]     [id number]

[address] [place]



        00:00:00 00:00:00                 MAGY                 546     Method

i



                                                                        

 

        2021 Inpatient         ANAIS, [facility]     064     [id number]

[id number] [place]



        00:00:00 00:00:00                 MELVIN                   275     Method

i



                                                                        

 

        2021 Outpatient         AHMED,  [facility]     [id number]

068 [place]



        00:00:00 00:00:00                 RAZIUDDIN                 199     Meth

farhana



                                                                        

 

        2021 Outpatient         EKERUO, [facility]     021     [id number]

[address] [place]



        00:00:00 00:00:00                 DAYLIN                  640     Method

i



                                                                        

 

        2021 Outpatient         EKERUO, [facility]     [id number]

[address] [place]



        00:00:00 00:00:00                 DAYLIN                  435     Method

i



                                                                        

 

        2021 Outpatient         EKERUO, [facility]     [id number]

[address] [place]



        00:00:00 00:00:00                 DAYLIN                  537     Method

i



                                                                        st

 

        2021 Outpatient         EKERUO, [facility]     [id number]

029 [place]



        00:00:00 00:00:00                 DAYLIN                  709     Method

i



                                                                        st

 

        2021 Inpatient         TONO, [facility]     064     [id number]

73466 [place]



        00:00:00 00:00:00                 MELISSA                    685     Method

i



                                                                        st

 

        2021 Outpatient         AHMED,  [facility]     [id number]

[address] [place]



        00:00:00 00:00:00                 RAZIUDDIN                 101     Meth

farhana



                                                                        st

 

        2021 Outpatient                 nullFlavo [place] [id number]

[id number] [facility]



        01:00:00 04:59:00                         r       [place] 58      l



                                                        [place]         Meredith

nn

 

        2021 Outpatient         AHMED,  [facility]    PUL     [id number]   

 [facility]



        20:00:00 23:59:00                 RAZIUDDIN                         

 

        2021 Outpatient         AHMED,  [facility]     [id number]

[address] [place]



        00:00:00 00:00:00                 RAZIUDDIN                 598     Meth

farhana



                                                                        st

 

        2021-04-15 2021 Inpatient         ANAIS, [facility]     064     [id number]

[id number] [place]



        00:00:00 00:00:00                 MELVIN                   060     Method

i



                                                                        

 

        2021 Outpatient                 [facility]     [id number]

[address] [place]



        00:00:00 00:00:00                                         470     Method

i



                                                                        st

 

        2021 Outpatient                 [facility] 3

[id number] Mem[facility]



        19:30:00 04:59:59                         r       Medicine 51      l



                                                        Rudy Hines

n

 

        2021 Outpatient                 [facility]     [id number]

[address] [place]



        00:00:00 00:00:00                                         398     Method

i



                                                                        st

 

        2021 Between                 [facility] 3467

[id number] Mem[facility]



        13:38:03 13:38:03 Visit                   r       Medicine 57      l



                                                        Rudy martinez

 

        2021 Outpatient         MED,  [facility]     [id number]

[address] [place]



        00:00:00 00:00:00                 RAZIUDDIN                 554     Meth

farhana



                                                                        

 

        2021 Inpatient         ANAIS, [facility]     025     

[id number] [place]



        00:00:00 00:00:00                 MELVIN                   541     Method

i



                                                                        

 

        2021 Inpatient         ANAIS [facility]     Ayse     

[id number] [place]



        00:00:00 00:00:00                 MELVIN                   559     Method

i



                                                                        

 

        2020 Inpatient         ANAIS, [facility]     012     

[id number] [place]



        00:00:00 00:00:00                 MELVIN                   271     Method

i



                                                                        

 

        2020 Outpatient                 [facility] 3

[id number] [facility]



        16:30:00 05:59:59                         r       Medicine 50      l



                                                        Rudy Hines

n

 

        2020 Inpatient         TONO, [facility]     012     [id number]

47612 [place]



        00:00:00 00:00:00                 MELISSA                    464     Method

i



                                                                        

 

        2020-10-23 2020 Inpatient         TONO, [facility]     012     [id number]

72214 [place]



        00:00:00 00:00:00                 MELISSA                    557     Method

i



                                                                        

 

        2020-10-23 2020-10-24 Outpt Diag                 nullFlavo [facility]    [id number]

08596 [facility]



        18:10:00 04:59:00 Services                 r       Outpatient 06      l



                                                        Imaging         [facility]



                                                        Carney         

 

        2020-10-21 2020-10-22 Between                 [facility]Fl[facility] 3467

[id number] [facility]



        17:58:19 17:58:19 Visit                   r       Medicine 56      l



                                                        Rudy Hines

n

 

        2020-10-13 2020-10-14 Outpatient                 [facility] 3

[id number] [facility]



        15:15:00 04:59:59                         r       Medicine 49      l



                                                        Rudy Hines

n

 

        2020 Outpt Diag                 nullFlavo [facility]    [id number]

[id number] [facility]



        17:02:00 04:59:00 Services                 r       Outpatient 05      l



                                                        Imaging         [facility]



                                                        Carney         

 

        2020 Ambulatory                 [facility]avo [facility]    [id number]

[id number] [facility]



        19:00:00 19:00:00 Pre-Reg                 r       Nephrology 46      l



                                                        Rudy Hines

n

 

        2020 Outpatient                 [facility]    IE    [id number]

365 [facility]



        11:15:00 11:15:00                                         47      dennis



                                                                        [place]

 

        2020 Outpatient                 nullFlavo [facility]    [id number]

[id number] [facility]



        19:45:00 04:59:59                         r       Nephrology 48      l



                                                        Rudy Hines

michelle

 

        2020 Outpatient         MANGIN, [facility]     [id number]

[address] [place] [address]



        00:00:00 00:00:00                 EMILIA                    832     Method

i



                                                                        st

 

        2020 Phone                   nullFlavo MG    [id number]

55 [facility]



        16:17:50 04:59:59 Message                 r       Nephrology 12      l



                                                        Rudy Hines

michelle

 

        2020 Outpatient                 nullFlavo [facility]    [id number]

[id number] [facility]



        19:00:00 04:59:59                         r       Nephrology 41      l



                                                        Rudy Hines

michelle

 

        2020 Phone                   nullFlavo [facility]    [id number]

55 [facility]



        18:35:51 04:59:59 Message                 r       Nephrology 11      l



                                                        Saundra         [place]

 

        2020 Outpatient                 [facility] 3

[id number] [facility]



        15:15:00 04:59:59                         r       Medicine 45      l



                                                        Rudy Hines

michelle

 

        2020 Ambulatory                 [facility] 3

[id number] [facility]



        15:15:00 15:15:00 Pre-Reg                 r       Medicine 44      l



                                                        Rudy Hines

michelle

 

        2020 Phone                   nullFlavo [facility]    [id number]

55 [facility]



        13:23:32 04:59:59 Message                 r       Nephrology 10      l



                                                        Rudy Hines

michelle

 

        2020 Between                 [facility] 3467

[id number] [facility]



        14:14:54 14:14:54 Visit                   r       Medicine 52      l



                                                        Rudy         Donny martinez

 

        2020 Outpatient                 [facility]    [facility]    [id number]

365 [facility]



        11:30:00 11:30:00                                         43      dennis



                                                                        [place]

 

        2020 2020-04-15 Phone                   [facility] 3467

[id number] [facility]



        20:00:15 04:59:59 Message                 r       Medicine 09      l



                                                        Rudy         Donny martinez

 

        2020-04-10 2020 Phone                   nullFlavo [facility] 3467

[id number] [facility]



        17:18:28 04:59:59 Message                 r       Medicine 08      dennis martinez

 

        2020-04-10 2020 Phone                   nullFlavo MG    [id number]

55 [facility]



        13:26:55 04:59:59 Message                 r       Nephrology 07      dennis martinez

 

        2020 Outpatient                 nullFlavo [facility] Family 3

[id number] [facility]



        20:15:00 05:59:59                         r       Medicine 42      dennis martinez

 

        2020 Phone                   nullFlavo [facility] Family 3467

[id number] [facility]



        14:22:27 05:59:59 Message                 r       Medicine 06      dennis Hines

michelle

 

        2019 Phone                   [facility] 3467

[id number] [facility]



        16:06:04 05:59:59 Message                 r       Medicine 05      dennis Hines

michelle

 

        2019 Between                 nullFlavo MG    [id number]

75 [facility]



        20:05:03 20:05:03 Visit                   r       Nephrology 45      dennis Mart

 

        2019 Outpatient                 [facility]avo [facility]    [id number]

[id number] [facility]



        20:45:00 05:59:59                         r       Nephrology 38      dennis Hines

michelle

 

        2019 Between                 nullFlavo [facility]    [id number]

75 [facility]



        00:07:10 00:07:10 Visit                   r       Nephrology 43      dennis Arguello         [place]

 

        2019 Outpatient                 [facility]    [id number]

365 [place]



        09:00:00 09:00:00                                         39      dennis



                                                                        [place]

 

        2019 Between                 [facility] 3467

[id number] [facility]



        21:47:12 21:47:12 Visit                   r       Medicine 41      dennis Hines

michelle

 

        2019-10-29 2019-10-30 Between                 nullFlavo [facility] 3467

[id number] [facility]



        22:13:13 22:13:13 Visit                   r       Medicine 40      dennis Hines

michelle

 

        2019-10-25 2019-10-26 Outpatient                 nullFlavo [facility] Family 3

[id number] [facility]



        14:45:00 04:59:59                         r       Medicine 40      dennis martinez

 

        2019-10-17 2019-10-17 Ambulatory                 [facility] 3

[id number] [facility]



        16:00:00 16:00:00 Pre-Reg                 r       Medicine 36      dennis martinez

 

        2019-10-10 2019-10-11 Outpatient                 nullFlavo [facility]    [id number]

[id number] [place]



        15:00:00 04:59:59                         r       Nephrology 35      dennis martinez

 

        2019-10-10 2019-10-10 Outpatient                 [facility]    [facility]    [id number]

365 [place]



        12:00:00 12:00:00                                         37      dennis Mart

 

        2019 Phone                   nullFlavo [facility] Family 3467

[id number] [facility]



        15:15:06 04:59:59 Message                 r       Medicine 04      dennis martniez

 

        2019 Phone                   nullFlavo MG    [id number]

55 [place]



        15:10:51 04:59:59 Message                 r       Nephrology 03      dennis martinez

 

        2019 Ambulatory                 nullFlavo MG    [id number]

[id number] [facility]



        20:00:00 20:00:00 Pre-Reg                 r       Nephrology 34      dennis martinez

 

        2019 Between                 nullFlavo MG    [id number]

75 [facility]



        20:15:16 20:15:16 Visit                   r       Nephrology 34      dennis Mart

 

        2019 Phone                   nullFlavo MG    [id number]

55 [facility]



        15:29:54 04:59:59 Message                 r       Nephrology 02      dennis Mart

 

        2019 Ambulatory                 nullFlavo [facility]    [id number]

[id number] [facility]



        16:30:00 16:30:00 Pre-Reg                 r       Nephrology 29      dennis martinez

 

        2019 Ambulatory                 nullFlavo [facility]    [id number]

[id number] [facility]



        16:15:00 16:15:00 Pre-Reg                 r       Nephrology 30      dennis martinez

 

        2019 Ambulatory                 nullFlavo [facility] Family 3

[id number] [facility]



        15:15:00 15:15:00 Pre-Reg                 r       Medicine 31      dennis martinez

 

        2019 Inpatient PABLITO ADEN   [facility]     TELE    [id number]

[address] [place]



        10:05:00 17:55:00                 [facility]

 

        2019 Outpatient PABLITO ADEN   [facility]     TELE    [id number]

[address] [place]



        21:36:00 19:00:00                 GOPALAMARA Martineza

[facility]

 

        2019 Outpatient                 nullFlavo [facility] 346

[id number] [facility]



        20:40:00 04:59:59                         r       Care    33      l



                                                        Candace Rosarioann

 

        2019 Outpatient                 nullFlavo [facility] Family 3

[id number] [facility]



        15:15:00 04:59:59                         r       Medicine 32      l



                                                        Rudy Hines

michelle

 

        2019 Between                 nullFlavo [facility] Family 3467

[id number] [facility]



        19:00:16 19:00:16 Visit                   r       Medicine 29      l



                                                        Grantshyanne Hines

michelle

 

        2019 2019-03-15 Between                 nullFlavo MG    [id number]

75 [facility]



        15:02:37 15:02:37 Visit                   r       Nephrology 27      l



                                                        Rudy Hines

michelle

 

        2019 2019-03-15 Outpatient                 nullFlavo [facility]    [id number]

[id number] [facility]



        18:45:00 04:59:59                         r       Nephrology 28      l



                                                        Rudy Hines

michelle

 

        2019 2019-03-15 Outpatient                 nullFlavo [facility] Family 3

[id number] [facility]



        14:15:00 04:59:59                         r       Medicine 22      l



                                                        Rudy Hines

michelle

 

        2019 Between                 nullFlavo MG    [id number]

75 [facility]



        23:59:47 23:59:47 Visit                   r       Nephrology 26      l



                                                        Grantshyanne Hines

michelle

 

        2019 Between                 nullFlavo MG    [id number]

75 [facility]



        22:00:33 22:00:33 Visit                   r       Nephrology 24      l



                                                        Rudy         Donny

michelle

 

        2019 Between                 nullFlavo MG    [id number]

75 [facility]



        04:48:44 04:48:44 Visit                   r       Nephrology 23      l



                                                        Grantshyanne Hines

michelle

 

        2019 Ambulatory                 nullFlavo MG    [id number]

[id number] [facility]



        20:30:00 20:30:00 Pre-Reg                 r       Nephrology 27      l



                                                        Grant         Donny martinez

 

        2019 Ambulatory                 nullFlavo MG    [id number]

[id number] [facility]



        18:40:00 18:40:00 Pre-Reg                 r       Nephrology 24      dennis martinez

 

        2019 Ambulatory                 nullFlavo MG    [id number]

[id number] [facility]



        15:30:00 15:30:00 Pre-Reg                 r       Internal 25      dennis Grant         

 

        2018 2018-10-20 Ambulatory                 nullFlavo MG    [id number]

[id number] [facility]



        12:45:00 12:45:00 Pre-Reg                 r       Internal 23      dennis Palacioberg         

 

        2018 Outpatient                 nullFlavo MG    [id number]

[id number] [facility]



        19:15:00 04:59:59                         r       Nephrology 26      dennis Hines

michelle

 

        2018 Outpatient                 nullFlavo MG    [id number]

[id number] [facility]



        18:00:00 04:59:59                         r       Nephrology 21      l



                                                        Rudy Hines

michelle

 

        2018 Outpatient                 nullFlavo MG Family 3

[id number] [facility]



        18:30:00 04:59:59                         r       Medicine 20      dennis Hines

michelle

 

        2018 Outpatient                 nullFlavo MG    [id number]

[id number] [facility]



        16:00:00 04:59:59                         r       Nephrology 19      dennis Hines

michelle

 

        2018 Outpatient                 nullFlavo MG Family 3

[id number] [facility]



        15:00:00 04:59:59                         r       Medicine 18      dennis Hines

michelle

 

        2017 Outpatient                 [facility]    [facility]    [id number]

365 [facility]



        10:40:00 10:40:00                                         16      dennis Mart

 

        2017 Outpatient                 [facility]    [facility]    [id number]

365 [facility]



        11:30:00 11:30:00                                         17      dennis Mart

 

        2017 Outpatient                 [facility]    [facility]    [id number]

365 [facility]



        11:40:00 11:40:00                                         11      dennis Mart

 

        2017 Outpatient                 [facility]    [facility]    [id number]

365 [facility]



        14:30:00 14:30:00                                         15      dennis Mart

 

        2017 Outpatient                 [facility]    [facility]    [id number]

365 [facility]



        10:00:00 10:00:00                                         08      dennis Mart

 

        2017 Bedded                  nullFlavo [facility] [id number]

375 [facility]



        11:48:35 14:30:00 Outpatient                 xuan Mart 13      dennis Pinzon         [place]

chirag

 

        2017 Outpatient                 NADEEM    NADEEM    [id number]

365 [facility]



        13:15:00 13:15:00                                         14      dennis Mart

 

        2016 Outpatient                 NADEEM    NADEEM    [id number]

365 [facility]



        09:30:00 09:30:00                                         12      dennis Mart

 

        2016 Outpatient                 NADEEM    NADEEM    [id number]

365 [facility]



        09:30:00 09:30:00                                         13      dennis Mart

 

        2016 Outpatient                 NADEEM    NADEEM    [id number]

365 [facility]



        10:20:00 10:20:00                                         09      dennis Mart

 

        2016 Outpatient                 NADEEM    NADEEM    [id number]

365 [facility]



        13:00:00 13:00:00                                         04      dennis Mart

 

        [id number]



        11:15:00 11:15:00                                         06      dennis Mart

 

        2016 OP Therapy                 nullFlavo [facility]     [id number]

[id number] [facility]



        13:00:00 04:59:00 Patients                 r       [place] 03      dennis Mart

 

        2016 OP Therapy                 nullFlavo [facility]     [id number]

[id number] [facility]



        17:39:00 04:59:00 Patients                 r       [place] 02      dennis Mart

 

        2016 OP Therapy                 nullFlavo [facility] [id number]

[id number] [facility]



        19:00:00 04:59:00 Patients                 r       [place]   01      dennis Mart

 

        2016 Outpt Diag                 nullFlavo [facility]    [id number]

[id number] [facility]



        16:14:00 04:59:00 Services                 r       [place] 04      dennis Mart



                                                        [place]         

 

        2016 OP Therapy                 nullFlavo [facility] [place]

[id number] [facility]



        19:00:00 04:59:00 Patients                 r       [place]   00      dennis Mart

 

        [id number]

[facility]



        10:20:00 10:20:00                                         01      l



                                                                        Barron

 

        2016-05-10 2016 Outpt Diag                 nullFlavo [facility]    [id number]

[id number] [facility]



        14:59:00 04:59:00 Services                 r       Outpatient 03      l



                                                        Imaging         Barron Zarate            

 

        2016 Outpt Diag                 nullFlavo [facility]    [id number]

[id number] [facility]



        18:11:00 04:59:00 Services                 r       Outpatient 01      l



                                                        Imaging         Barron



                                                        [place]         

 

        2016-03-10 2016-03-10 Outpatient                 nullFlavo [facility]    [id number]

   [facility]



        12:50:31 19:25:00                         r                       l



                                                                        Barron

 

        2016 2016-02-10 Outpt Diag                 nullFlavo [facility]    [id number]

[id number] [facility]



        12:58:00 05:59:00 Services                 r       Outpatient 00      l



                                                        Imaging         Barron



                                                        [place]         

 

        2016 Outpatient                 IE    [facility]    [id number]

365 [facility]



        10:15:00 10:15:00                                         02      l



                                                                        Barron

 

        2015 Outpatient                 [facility]    [id number]

365 [facility]



        11:30:00 11:30:00                                         00      l



                                                                        Barron

 

        2014 Outpatient                 nullFlavo [facility] 3467

2273_3 Memoria



        01:16:00 05:59:00                         r       [place]ron [id number] [facility] 1       [place]

nn

 

        2014 Outpatient                 nullFlavo       [id number]

79916 [facility]



        19:16:00 19:16:00                         r       [place] 01      l



                                                                        Barron

 

        2014 Outpatient                 nullFlavo       [id number]

[id number] [facility]



        19:43:00 19:43:00                         r       [place] 00      l



                                                                        [place]







Results







           Test Description Test Time  Test Comments Results    Result Comments 

Source









                          SARS-CoV-2 (COVID-19) RNA [Presence] in Respiratory sp

ecimen by 2022 

23:10:10                                



                    EDWARD with probe detection                     









                      Test Item  Value      Reference Range Interpretation Comme

nts









             SARS coronavirus RNA [Presence] in Isolate by EDWARD with probe Not de

tected                           



             detection (test code = 42286-2)                                    

    

 

             Whether patient is employed in a healthcare setting (test code = No

                                     



             51159-7)                                            

 

             Whether the patient has symptoms related to condition of interest Y

es                                    



             (test code = 60119-0)                                        

 

             Whether the patient was hospitalized for condition of interest No  

                                   



             (test code = 14628-0)                                        

 

             Whether the patient was admitted to intensive care unit (ICU) for N

o                                     



             condition of interest (test code = 00597-7)                        

                

 

             Whether patient resides in a congregate care setting (test code = N

o                                     



             08216-8)                                            

 

             Pregnancy status (test code = 82810-3) No                          

           

 

             Date and time of symptom onset (test code = 46870-7) Unknown       

                         



SARS-CoV-2 (COVID-19) RNA [Presence] in Respiratory specimen by EDWARD with probe 
[id number] 23:10:10





             Test Item    Value        Reference Range Interpretation Comments

 

             SARS-CoV-2 (COVID-19) RNA Not detected                           



             [Presence] in Respiratory                                        



             specimen by EDWARD with probe                                        



             detection (test code = 69098-1)                                    

    

 

             Whether patient is employed in a No                                

     



             healthcare setting (test code =                                    

    



             83286-3)                                            

 

             Whether the patient has symptoms Yes                               

     



             related to condition of interest                                   

     



             (test code = 07216-0)                                        

 

             Whether the patient was No                                     



             hospitalized for condition of                                      

  



             interest (test code = 89947-5)                                     

   

 

             Whether the patient was admitted No                                

     



             to intensive care unit (ICU) for                                   

     



             condition of interest (test code                                   

     



             = 83420-6)                                          

 

             Whether patient resides in a No                                    

 



             congregate care setting (test                                      

  



             code = 21346-2)                                        

 

             Pregnancy status (test code = No                                   

  



             72025-8)                                            

 

             Date and time of symptom onset Unknown                             

   



             (test code = 78560-6)                                        



SARS-CoV-2 (COVID-19) RNA [Presence] in Respiratory specimen by EDWARD with probe 
[id number] 22:34:30





             Test Item    Value        Reference Range Interpretation Comments

 

             SARS-CoV-2 (COVID-19) RNA Not detected Not-Detected              



             [Presence] in Respiratory                                        



             specimen by EDWARD with probe                                        



             detection (test code = 76594-5)                                    

    

 

             Whether patient is employed in a                                   

     



             healthcare setting (test code =                                    

    



             09579-7)                                            

 

             Whether the patient has symptoms                                   

     



             related to condition of interest                                   

     



             (test code = 46548-7)                                        

 

             Patient was hospitalized because                                   

     



             of this condition (test code =                                     

   



             23212-6)                                            

 

             Whether the patient was admitted                                   

     



             to intensive care unit (ICU) for                                   

     



             condition of interest (test code                                   

     



             = 11481-8)                                          

 

             Whether patient resides in a                                       

 



             congregate care setting (test                                      

  



             code = 14823-0)                                        



SARS-CoV-2 (COVID-19) RNA [Presence] in Respiratory specimen by EDWARD with probe 
[id number] 22:35:42





             Test Item    Value        Reference Range Interpretation Comments

 

             SARS-CoV-2 (COVID-19) RNA Not detected Not-Detected              



             [Presence] in Respiratory                                        



             specimen by EDWARD with probe                                        



             detection (test code = 64096-5)                                    

    

 

             Whether patient is employed in a                                   

     



             healthcare setting (test code =                                    

    



             30302-3)                                            

 

             Whether the patient has symptoms                                   

     



             related to condition of interest                                   

     



             (test code = 36534-6)                                        

 

             Patient was hospitalized because                                   

     



             of this condition (test code =                                     

   



             74559-4)                                            

 

             Whether the patient was admitted                                   

     



             to intensive care unit (ICU) for                                   

     



             condition of interest (test code                                   

     



             = 08204-5)                                          

 

             Whether patient resides in a                                       

 



             congregate care setting (test                                      

  



             code = 48166-3)                                        



SARS-CoV-2 (COVID-19) RNA [Presence] in Respiratory specimen by EDWARD with probe 
[id number] 22:46:10





             Test Item    Value        Reference Range Interpretation Comments

 

             SARS-CoV-2 (COVID-19) RNA Not detected Not-Detected              



             [Presence] in Respiratory                                        



             specimen by EDWARD with probe                                        



             detection (test code = 81927-8)                                    

    

 

             Whether patient is employed in a                                   

     



             healthcare setting (test code =                                    

    



             97419-0)                                            

 

             Whether the patient has symptoms                                   

     



             related to condition of interest                                   

     



             (test code = 81053-2)                                        

 

             Patient was hospitalized because                                   

     



             of this condition (test code =                                     

   



             21549-5)                                            

 

             Whether the patient was admitted                                   

     



             to intensive care unit (ICU) for                                   

     



             condition of interest (test code                                   

     



             = 91148-2)                                          

 

             Whether patient resides in a                                       

 



             congregate care setting (test                                      

  



             code = 79349-6)                                        



SARS-CoV-2 (COVID-19) RNA [Presence] in Respiratory specimen by EDWARD with probe 
[id number] 01:54:24





             Test Item    Value        Reference Range Interpretation Comments

 

             SARS-CoV-2 (COVID-19) RNA Not detected Not-Detected              



             [Presence] in Respiratory                                        



             specimen by EDWARD with probe                                        



             detection (test code = 18552-1)                                    

    

 

             Whether patient is employed in a                                   

     



             healthcare setting (test code =                                    

    



             94407-7)                                            

 

             Whether the patient has symptoms                                   

     



             related to condition of interest                                   

     



             (test code = 94208-4)                                        

 

             Patient was hospitalized because                                   

     



             of this condition (test code =                                     

   



             49262-2)                                            

 

             Whether the patient was admitted                                   

     



             to intensive care unit (ICU) for                                   

     



             condition of interest (test code                                   

     



             = 86475-7)                                          

 

             Whether patient resides in a                                       

 



             congregate care setting (test                                      

  



             code = 51980-8)                                        



SARS-CoV-2 (COVID-19) RNA [Presence] in Respiratory specimen by EDWARD with probe 
[id number] 21:44:06





             Test Item    Value        Reference Range Interpretation Comments

 

             SARS-CoV-2 (COVID-19) RNA Not detected Not-Detected              



             [Presence] in Respiratory                                        



             specimen by EDWARD with probe                                        



             detection (test code = 99223-4)                                    

    

 

             Whether patient is employed in a                                   

     



             healthcare setting (test code =                                    

    



             74123-0)                                            

 

             Whether the patient has symptoms                                   

     



             related to condition of interest                                   

     



             (test code = 42649-6)                                        

 

             Patient was hospitalized because                                   

     



             of this condition (test code =                                     

   



             15610-0)                                            

 

             Whether the patient was admitted                                   

     



             to intensive care unit (ICU) for                                   

     



             condition of interest (test code                                   

     



             = 04894-3)                                          

 

             Whether patient resides in a                                       

 



             congregate care setting (test                                      

  



             code = 41815-0)                                        



SARS-CoV-2 (COVID-19) RNA [Presence] in Respiratory specimen by EDWARD with probe 
[id number] 00:36:59





             Test Item    Value        Reference Range Interpretation Comments

 

             SARS-CoV-2 (COVID-19) RNA Not detected Not-Detected              



             [Presence] in Respiratory                                        



             specimen by EDWARD with probe                                        



             detection (test code = 45257-6)                                    

    



SARS-CoV-2 (COVID-19) RNA [Presence] in Respiratory specimen by EDWARD with probe 
[id number] 17:35:35





             Test Item    Value        Reference Range Interpretation Comments

 

             SARS-CoV-2 (COVID-19) RNA Not detected Not-Detected              



             [Presence] in Respiratory                                        



             specimen by EDWARD with probe                                        



             detection (test code = 87730-9)                                    

    



SARS-CoV-2 (COVID-19) RNA [Presence] in Respiratory specimen by EDWARD with probe 
[id number] 18:03:30





             Test Item    Value        Reference Range Interpretation Comments

 

             SARS-CoV-2 (COVID-19) RNA Not detected Not-Detected              



             [Presence] in Respiratory                                        



             specimen by EDWARD with probe                                        



             detection (test code = 06692-5)                                    

    



SARS-CoV-2 (COVID-19) RNA [Presence] in Respiratory specimen by EDWARD with probe 
[id number] 10:06:03





             Test Item    Value        Reference Range Interpretation Comments

 

             SARS-CoV-2 (COVID-19) RNA Not detected Not-Detected              



             [Presence] in Respiratory                                        



             specimen by EDWARD with probe                                        



             detection (test code = 43149-8)                                    

    



SARS-CoV-2 (COVID-19) RNA [Presence] in Respiratory specimen by EDWARD with probe 
[id number] 05:11:58





             Test Item    Value        Reference Range Interpretation Comments

 

             SARS-CoV-2 (COVID-19) RNA Not detected Not-Detected              



             [Presence] in Respiratory                                        



             specimen by EDWARD with probe                                        



             detection (test code = 87898-5)                                    

    



SARS-CoV-2 (COVID-19) RNA [Presence] in Respiratory specimen by EDWARD with probe 
[id number] 03:34:16





             Test Item    Value        Reference Range Interpretation Comments

 

             SARS-CoV-2 (COVID-19) RNA Not detected Not-Detected              



             [Presence] in Respiratory                                        



             specimen by EDWARD with probe                                        



             detection (test code = 06473-5)                                    

    



SARS-CoV-2 (COVID-19) RNA [Presence] in Respiratory specimen by EDWARD with probe 
[id number] 10:06:41





             Test Item    Value        Reference Range Interpretation Comments

 

             SARS-CoV-2 (COVID-19) RNA Not detected Not-Detected              



             [Presence] in Respiratory                                        



             specimen by EDWARD with probe                                        



             detection (test code = 09032-9)                                    

    



[facility]2020-10-14 12:33:00





             Test Item    Value        Reference Range Interpretation Comments

 

             Chol (test code = Chol) 129                                    



[facility]





             Test Item    Value        Reference Range Interpretation Comments

 

             HDL (test code = HDL) 37                                     



[facility]





             Test Item    Value        Reference Range Interpretation Comments

 

             Trig (test code = Trig) 76                                     



[facility]





             Test Item    Value        Reference Range Interpretation Comments

 

             LDL (Calculated) (test code = LDL 76                               

      



             (Calculated))                                        



[facility]





             Test Item    Value        Reference Range Interpretation Comments

 

             CHD Risk (test code = CHD Risk) 3.5                                

    



[facility]





             Test Item    Value        Reference Range Interpretation Comments

 

             Non HDL Chol (test code = Non HDL Chol) 92                         

            



[facility] [employer] [id number] 14:47:00





             Test Item    Value        Reference Range Interpretation Comments

 

             Vitamin D, 25-OH, Total (test code = 37                      

         



             Vitamin D, 25-OH, Total)                                        



[facility]2020-08-06 14:47:00





             Test Item    Value        Reference Range Interpretation Comments

 

             U Creat mg/dL (test code = U Creat 114                       

       



             mg/dL)                                              



[place]6850-08-06 14:47:00





             Test Item    Value        Reference Range Interpretation Comments

 

             U Prot/Creat (test code = U Prot/Creat) 430                  

            



[place] 946850-08-06 14:47:00





             Test Item    Value        Reference Range Interpretation Comments

 

             U Prot/Creat (test code = U 0.430 1      0.021-0.161               



             Prot/Creat)                                         



[place] 946850-08-06 14:47:00





             Test Item    Value        Reference Range Interpretation Comments

 

             U Protein (test code = U Protein) 49           5-24                

      



[place] 946850-08-06 14:47:00





             Test Item    Value        Reference Range Interpretation Comments

 

             Glucose Lvl (test code = Glucose Lvl) 103          65-99           

          



[place] 946850-08-06 14:47:00





             Test Item    Value        Reference Range Interpretation Comments

 

             BUN (test code = BUN) 24           7-25                      



[place] 946850-08-06 14:47:00





             Test Item    Value        Reference Range Interpretation Comments

 

             Creatinine Lvl (test code = Creatinine 1.32         0.50-0.99      

           



             Lvl)                                                



[facility]2020-08-06 14:47:00





             Test Item    Value        Reference Range Interpretation Comments

 

             eGFR NON-AFR. AMERICAN (test code = 43                             

        



             eGFR NON-AFR. AMERICAN)                                        



[place] 946850-08-06 14:47:00





             Test Item    Value        Reference Range Interpretation Comments

 

             eGFR  (test code = eGFR 50                         

            



             )                                        



[place] 946850-08-06 14:47:00





             Test Item    Value        Reference Range Interpretation Comments

 

             B/C Ratio (test code = B/C Ratio) 18           6-22                

      



[place] 946850-08-06 14:47:00





             Test Item    Value        Reference Range Interpretation Comments

 

             Sodium Lvl (test code = Sodium Lvl) 138          135-146           

        



[place] 946850-08-06 14:47:00





             Test Item    Value        Reference Range Interpretation Comments

 

             Potassium Lvl (test code = Potassium 4.4          3.5-5.3          

         



             Lvl)                                                



[place] 946850-08-06 14:47:00





             Test Item    Value        Reference Range Interpretation Comments

 

             Chloride Lvl (test code = Chloride Lvl) 102                  

            



[place] 946850-08-06 14:47:00





             Test Item    Value        Reference Range Interpretation Comments

 

             CO2 (test code = CO2) 30           20-32                     



[place] 946850-08-06 14:47:00





             Test Item    Value        Reference Range Interpretation Comments

 

             Calcium Lvl (test code = Calcium Lvl) 9.4          8.6-10.4        

          



[place] [address]-08-06 14:47:00





             Test Item    Value        Reference Range Interpretation Comments

 

             Phosphorus (test code = Phosphorus) 4.8          2.5-4.5           

        



[place] 946850-08-06 14:47:00





             Test Item    Value        Reference Range Interpretation Comments

 

             Albumin Lvl (test code = Albumin Lvl) 3.9          3.6-5.1         

          



[place] [address]-08-06 14:47:00





             Test Item    Value        Reference Range Interpretation Comments

 

             Plt Count Estimated (test code = DECREASED                         

     



             Plt Count Estimated)                                        



[place] [address]-08-06 14:47:00





             Test Item    Value        Reference Range Interpretation Comments

 

             WBC X 10x3 (test code = WBC X 10x3) 7.2          3.8-10.8          

        



[place] [address]-08-06 14:47:00





             Test Item    Value        Reference Range Interpretation Comments

 

             RBC X 10x6 (test code = RBC X 10x6) 3.71         3.80-5.10         

        



[place] [address]-08-06 14:47:00





             Test Item    Value        Reference Range Interpretation Comments

 

             Hgb (test code = Hgb) 10.7         11.7-15.5                 



[place] [address]-08-06 14:47:00





             Test Item    Value        Reference Range Interpretation Comments

 

             Hct (test code = Hct) 33.9         35.0-45.0                 



[place] [address]-08-06 14:47:00





             Test Item    Value        Reference Range Interpretation Comments

 

             MCV (test code = MCV) 91.4         80.0-100.0                



[place] [address]-08-06 14:47:00





             Test Item    Value        Reference Range Interpretation Comments

 

             MCH (test code = MCH) 28.8 pg      27.0-33.0                 



[place] [address]-08-06 14:47:00





             Test Item    Value        Reference Range Interpretation Comments

 

             MCHC (test code = MCHC) 31.6         32.0-36.0                 



[facility]51[facility]





             Test Item    Value        Reference Range Interpretation Comments

 

             RDW (test code = RDW) 13.9         11.0-15.0                 



[facility]landChizbyzRJVRWPEMUR1748-21-77 14:47:00





             Test Item    Value        Reference Range Interpretation Comments

 

             Platelet (test code = Platelet) 110          140-400               

    



[facility]landMhenuimQTWUZPFLUB2109-10-78 14:47:00





             Test Item    Value        Reference Range Interpretation Comments

 

             MPV (test code = MPV) 11.7         7.5-12.5                  



[facility]landCgosmwhRGJAEDIZRP2816-41-04 14:47:00





             Test Item    Value        Reference Range Interpretation Comments

 

             Neutrophils # (test code = Neutrophils 5414         0750-9378      

           



             #)                                                  



[facility]landKtyxbqcVDKNTWXFTP8025-86-01 14:47:00





             Test Item    Value        Reference Range Interpretation Comments

 

             Lymphocytes # (test code = Lymphocytes 1152         850-3900       

           



             #)                                                  



[facility]landRmrivwbNAYDBQXVPU0162-62-08 14:47:00





             Test Item    Value        Reference Range Interpretation Comments

 

             Monocytes # (test code = Monocytes #) 461          200-950         

          



[facility] PearlandYmuqzyyLIVWGCAHCO2327-70-97 14:47:00





             Test Item    Value        Reference Range Interpretation Comments

 

             Eosinophils # (test code = Eosinophils 122                   

           



             #)                                                  



[facility]landTalcdzeYQHQEJOOYA2047-59-29 14:47:00





             Test Item    Value        Reference Range Interpretation Comments

 

             Basophils # (test code 50           See_Comment                [Aut

omated message] The



             = Basophils #)                                        system which 

generated



                                                                 this result tra

nsmitted



                                                                 reference range

: <=200.



                                                                 The reference r

cheyenne was



                                                                 not used to int

erpret



                                                                 this result as



                                                                 normal/abnormal

.



[facility] PearlandRtrolliPHBPPPSOUS4347-56-47 14:47:00





             Test Item    Value        Reference Range Interpretation Comments

 

             Segs (test code = Segs) 75.2                                   



[facility] PearlandIntsrikKEJAXCICMT8146-98-73 14:47:00





             Test Item    Value        Reference Range Interpretation Comments

 

             Lymphocytes (test code = Lymphocytes) 16.0                         

          



[facility] PearlandUtxeilaDBTHOZAXTZ9213-40-88 14:47:00





             Test Item    Value        Reference Range Interpretation Comments

 

             Monocytes (test code = Monocytes) 6.4                              

      



[facility]landAjydlngBIVUJMPEQE8670-72-85 14:47:00





             Test Item    Value        Reference Range Interpretation Comments

 

             Eosinophils (test code = Eosinophils) 1.7                          

          



[facility] PearlandOxjnknxYXBANRSJDO1596-51-40 14:47:00





             Test Item    Value        Reference Range Interpretation Comments

 

             Basophils (test code = Basophils) 0.7                              

      



[facility] [id number] 14:47:00





             Test Item    Value        Reference Range Interpretation Comments

 

             Result 2 (Urine Culture) See Result Comment                        

   



             (test code = Result 2                                        



             (Urine Culture))                                        



[facility] [id number] 14:47:00





             Test Item    Value        Reference Range Interpretation Comments

 

             UA Color (test code = UA Color) YELLOW                             

    



[facility] [id number] 14:47:00





             Test Item    Value        Reference Range Interpretation Comments

 

             UA Turbidity (test code = UA CLOUDY                                

 



             Turbidity)                                          



[facility] [id number] 14:47:00





             Test Item    Value        Reference Range Interpretation Comments

 

             UA Spec Grav (test code = UA Spec 1.017 1      1.001-1.035         

      



             Grav)                                               



[facility] [id number] 14:47:00





             Test Item    Value        Reference Range Interpretation Comments

 

             UA pH (test code = UA pH) 5.5 1        5.0-8.0                   



[facility] [id number] 14:47:00





             Test Item    Value        Reference Range Interpretation Comments

 

             UA Glucose (test code = UA Glucose) NEGATIVE                       

        



[facility] [id number] 14:47:00





             Test Item    Value        Reference Range Interpretation Comments

 

             UA Bili (test code = UA Bili) NEGATIVE                             

  



[facility] [id number] 14:47:00





             Test Item    Value        Reference Range Interpretation Comments

 

             UA Ketones (test code = UA Ketones) NEGATIVE                       

        



[facility] [id number] 14:47:00





             Test Item    Value        Reference Range Interpretation Comments

 

             UA Blood (test code = UA Blood) 1+                                 

    



[facility] [id number] 14:47:00





             Test Item    Value        Reference Range Interpretation Comments

 

             UA Protein (test code = UA Protein) 1+                             

        



[facility] [id number] 14:47:00





             Test Item    Value        Reference Range Interpretation Comments

 

             UA Nitrite (test code = UA Nitrite) POSITIVE                       

        



[facility] [id number] 14:47:00





             Test Item    Value        Reference Range Interpretation Comments

 

             UA Leuk Est (test code = UA Leuk Est) 2+                           

          



[facility] [id number] 14:47:00





             Test Item    Value        Reference Range Interpretation Comments

 

             UA WBC (test code = UA WBC) > OR = 60                              



[facility] [id number] 14:47:00





             Test Item    Value        Reference Range Interpretation Comments

 

             UA RBC (test code = UA RBC) 0-2                                    



[facility] [id number] 14:47:00





             Test Item    Value        Reference Range Interpretation Comments

 

             UA Sq Epi (test code = UA Sq Epi) NONE SEEN                        

      



[facility] [id number] 14:47:00





             Test Item    Value        Reference Range Interpretation Comments

 

             UA Bacteria (test code = UA Bacteria) MANY                         

          



[facility] [id number] 14:47:00





             Test Item    Value        Reference Range Interpretation Comments

 

             UA Hyal Cast (test code = UA Hyal NONE SEEN                        

      



             Cast)                                               



[facility] AND [id number] 14:47:00





             Test Item    Value        Reference Range Interpretation Comments

 

             UA Reflex (test code CULTURE INDICATED -                           



             = UA Reflex) RESULTS TO FOLLOW                           



[facility]2020-08-06 14:47:00





             Test Item    Value        Reference Range Interpretation Comments

 

             U Creat mg/dL (test code = U Creat 114                       

       



             mg/dL)                                              



[facility] [id number] 14:47:00





             Test Item    Value        Reference Range Interpretation Comments

 

             U Alb (test code = U Alb) 16.7                                   



[facility] [id number] 14:47:00





             Test Item    Value        Reference Range Interpretation Comments

 

             U Alb/Crea (test code = U Alb/Crea) 146                            

        



[facility] PROTEIN ELECTROPHORESIS-24HR [id number] 08:01:00





             Test Item    Value        Reference Range Interpretation Comments

 

             CREATININE, 24 HOUR 1.45 g/24 h  0.50-2.15                 



             URINE (test code =                                        



             95540605)                                           

 

             PROTEIN/CREATININE 292 mg/g creat < OR = 114   H            



             RATION (test code =                                        



             95977659)                                           

 

             PROTEIN, TOTAL 24 HR  mg/24 h  <150         H            TEST

 PERFORMED



             (test code = 21547096)                                        AT:QU

EST



                                                                 [facility]

IN



                                                                 [address] [place], [place] [address], [place]



                                                                 [id number] FELIX MD

 

             ALBUMIN (test code = 35 %                                   



             23626235)                                           

 

             ALPHA-1-GLOBULINS (test 10 %                                   



             code = 91438659)                                        

 

             ALPHA-2-GLOBULINS (test 12 %                                   



             code = 15442784)                                        

 

             BETA GLOBULINS (test 25 %                                   



             code = 54923298)                                        

 

             GAMMA GLOBULINS (test 18 %                                   



             code = 57021498)                                        

 

             INTERPRETATION (test                                        Albumin

 and



             code = 91746003)                                        various aba

bulin



                                                                 fractions



                                                                 detected on



                                                                 proteinelectrop

ho



                                                                 resis. No



                                                                 abnormal protei

n



                                                                 bands



                                                                 (Bence-Jonespro

te



                                                                 inuria)



                                                                 detected.TEST



                                                                 PERFORMED



                                                                 AT:[facility]



                                                                 [facility]

IN



                                                                 [address]



                                                                 [id number] FELIX MD



NEUTROPHIL CYTOPLASMIC MM--94-21 05:19:00





             Test Item    Value        Reference Range Interpretation Comments

 

             ANCA SCREEN (test NEGATIVE     NEGATIVE                  ANCA Scree

n includes



             code = 15774808)                                        evaluation 

for p-ANCA,



                                                                 c-ANCA andatypi

tayla p-ANCA.



                                                                 A positive ANCA

 screen



                                                                 reflexes to tit

erand



                                                                 pattern(s), e.g

.,



                                                                 cytoplasmic pat

tern



                                                                 (c-ANCA),perinu

clear



                                                                 pattern (p-ANCA

), or



                                                                 atypical p-ANCA



                                                                 pattern.c-ANCA 

and p-ANCA



                                                                 are observed in

 vasculitis,



                                                                 whereasatypical

 p-ANCA is



                                                                 observed in IBD



                                                                 (Inflammatory



                                                                 BowelDisease). 

Atypical



                                                                 p-ANCA is detec

kolby in about



                                                                 55% to 80% ofpa

tients with



                                                                 ulcerative coli

tis but only



                                                                 5% to 25% ofpat

ients with



                                                                 Crohn's disease

.TEST



                                                                 PERFORMED AT:[facility]



                                                                 [facility]



                                                                 [id number] [facility] [place] [place]



                                                                 [id number] MARIEE MD,PHD

,RAMILA



COMPREHENSIVE METABOLIC [id number] 06:25:00





             Test Item    Value        Reference Range Interpretation Comments

 

             GLUCOSE (test code = 06D) 115 mg/dL           H            

 

             SODIUM (test code = 01A) 137 mmol/L   136-145                   

 

             POTASSIUM (test code = 01B) 3.3 mmol/L   3.6-5.1      L            

 

             CHLORIDE (test code = 04A) 97 mmol/L           L            

 

             CO2 (test code = 02A) 32 mmol/L    22-32                     

 

             ANION GAP (test code = ANG) 11.3 mmol/L                            

 

             BUN (test code = 05D) 36 mg/dL     7-18         H            

 

             CREATININE (test code = 03E) 1.4 mg/dL    0.4-1.1      H           

 

 

             BUN/CREA (test code = BCR) 25           12-20        H            

 

             CALCIUM (test code = 09D) 8.8 mg/dL    8.3-9.5                   

 

             BILI TOTAL (test code = 11A) 1.2 mg/dL    0.2-1.0      H           

 

 

             PROTEIN (test code = 07D) 6.5 g/dL     6.4-8.2                   

 

             ALBUMIN (test code = 08D) 2.9 g/dL     3.5-4.8      L            

 

             GLOBULIN (test code = GLB) 3.6 g/dL     1.5-3.8                   

 

             ALB/GLOB (test code = AGRR) 0.8          1.0-2.6      L            

 

             ALK PHOS (test code = 35A) 97 IU/L                          

 

             AST (test code = 30A) 15 IU/L      <=42                      

 

             ALT (test code = 31A) 35 IU/L      <=78                      



CBC (INCLUDES AUTOMATED DIFFERENTIAL)2019 06:06:00





             Test Item    Value        Reference Range Interpretation Comments

 

             WBC (test code = WBC) 6.9 10\S\3/uL 4.5-11.0                  

 

             RBC (test code = RBC) 3.56 10\S\6/uL 4.20-5.60    L            

 

             HGB (test code = HBG) 10.4 g/dL    12.0-15.5    L            

 

             HCT (test code = HCT) 32.1 %       35.0-44.0    L            

 

             MCV (test code = MCV) 90.2 fL      81.0-99.0                 

 

             MCH (test code = MCH) 29.2 pg      27.0-31.0                 

 

             MCHC (test code = MCHC) 32.4 g/dL    32.0-36.0                 

 

             RDW (test code = RDW) 15.0 %       11.5-14.5    H            

 

             PLT (test code = PLT) 158 10\S\3/uL 130-400                   

 

             MPV (test code = MPV) 10.7 fL      9.4-12.4                  

 

             NEUTROP # (test code = NE#) 4.4 10\S\3/uL 1.6-8.0                  

 

 

             LYMPH # (test code = LY#) 1.8 10\S\3/uL 1.1-3.5                   

 

             MONOCYTE # (test code = MO#) 0.5 10\S\3/uL 0.0-1.1                 

  

 

             EOSINOPH # (test code = EO#) 0.2 10\S\3/uL 0.0-0.7                 

  

 

             BASOPHIL # (test code = BA#) 0.0 10\S\3/uL 0.0-0.3                 

  

 

             IG # (test code = IG#) 0.03 10\S\3/uL 0.00-0.06                 

 

             NRBC # (test code = NRBC#) 0.00 10\S\3/uL 0.00-0.01                

 

 

             NEUTROPH % (test code = NE%) 64.0 %       35.0-73.0                

 

 

             LYMPH % (test code = LY%) 26.1 %       20.0-55.0                 

 

             MONO % (test code = MO%) 6.5 %        2.5-10.0                  

 

             EOSINOPH % (test code = EO%) 2.6 %        0.0-5.0                  

 

 

             BASOPHIL % (test code = BA%) 0.4 %        0.0-2.0                  

 

 

             IG % (test code = IG%) 0.4 %        0.0-0.8                   

 

             NRBC% (test code = NRBC%) 0.0 %        0.0-0.2                   

 

             MANDIFF (test code = MDIFF) NO           NO                        

 

             RBC MORPH (test code = RBCMOR)              NORMAL                 

   



URINE [id number] 09:53:00





             Test Item    Value        Reference Range Interpretation Comments

 

             Isolate 1 (test code = Proteus mirabilis              A            



             ISO1)                                               

 

             ampicillin (test code = am)  ug/mL                    S            

 

             ampicillin/sulbactam (test  ug/mL                    S            



             code = ams)                                         

 

             piperacillin/tazobactam  ug/mL                    S            



             (test code = tzp)                                        

 

             ceftazidime (test code =  ug/mL                    S            



             jeannine)                                                

 

             ceftriaxone1 (test code =  ug/mL                    S            



             ctr)                                                

 

             cefepime (test code = fep)  ug/mL                    S            

 

             aztreonam (test code = azm)  ug/mL                    S            

 

             ertapenem (test code = etp)  ug/mL                    S            

 

             meropenem (test code = mem)  ug/mL                    S            

 

             gentamicin (test code = gm)  ug/mL                    S            

 

             tobramycin (test code =  ug/mL                    S            



             tob)                                                

 

             levofloxacin (test code =  ug/mL                    S            



             lev)                                                

 

             trimethoprim/sulfamethoxazo  ug/mL                    S            



             le (test code = sxt)                                        



PARTHYROID HORMONE (INTACT)2019 08:24:00





             Test Item    Value        Reference Range Interpretation Comments

 

             PTH INTACT (test code 166.6 pg/mL  18.4-80.1    H            



             = A85)                                              

 

             Ref Range Change ***** Please note the                           



             (test code = REF change in reference                           



             RANGE)       range ***                              



VITAMIN D TOTAL 25 (OH)2019 07:15:00





             Test Item    Value        Reference Range Interpretation Comments

 

             VITAMIN D 25(OH) (test code = VD) 36.0 ng/mL   30.0-100.0          

      



SERUM PROTEIN [id number] 06:20:00





             Test Item    Value        Reference Range Interpretation Comments

 

             PROTEIN TOTAL (test 5.7 g/dL     6.1-8.1      L            TEST PER

FORMED AT:[facility]



             code = 65062635)                                        [facility]

[id number]



                                                                 [facility]

[place], [place]



                                                                 [id number] FELIX MD

 

             ALBUMIN (test code = 3.2 g/dL     3.8-4.8      L            



             [id number])                                           

 

             ALPHA-1-GLOBULINS 0.4 g/dL     0.2-0.3      H            



             (test code =                                        



             97930964)                                           

 

             ALPHA-2-GLOBULINS 0.8 g/dL     0.5-0.9                   



             (test code =                                        



             82368525)                                           

 

             BETA 1 GLOBULIN (test 0.5 g/dL     0.4-0.6                   



             code = 13815648)                                        

 

             BETA 2 GLOBULIN (test 0.2 g/dL     0.2-0.5                   



             code = 61064761)                                        

 

             GAMMA GLOBULINS (test 0.6 g/dL     0.8-1.7      L            



             code = 10721290)                                        

 

             INTERPRETATION (test                                        Pattern

 consistent with



             code = [id number])                                        an acute ph

ase



                                                                 reactionConsist

ent with



                                                                 hypogammaglobul

inemia.



                                                                 Serum free ligh

tchains



                                                                 or urine immuno

fixation



                                                                 should be consi

dered



                                                                 ifplasma cell d

yscrasias



                                                                 are a possible



                                                                 clinicaldiagnos

is.TEST



                                                                 PERFORMED AT:[facility]

[facility]



                                                                 [facility]

[id number]



                                                                 [facility]

[place], [place]



                                                                 [id number] FELIX MD



[id number] 06:13:00





             Test Item    Value        Reference Range Interpretation Comments

 

             MAGNESIUM (test code = 48A) 1.7 mg/dL    1.8-2.4      L            



COMPREHENSIVE METABOLIC [id number] 06:13:00





             Test Item    Value        Reference Range Interpretation Comments

 

             GLUCOSE (test code = 06D) 110 mg/dL           H            

 

             SODIUM (test code = 01A) 140 mmol/L   136-145                   

 

             POTASSIUM (test code = 01B) 3.1 mmol/L   3.6-5.1      L            

 

             CHLORIDE (test code = 04A) 96 mmol/L           L            

 

             CO2 (test code = 02A) 34 mmol/L    22-32        H            

 

             ANION GAP (test code = ANG) 13.1 mmol/L                            

 

             BUN (test code = 05D) 33 mg/dL     7-18         H            

 

             CREATININE (test code = 03E) 1.5 mg/dL    0.4-1.1      H           

 

 

             BUN/CREA (test code = BCR) 22           12-20        H            

 

             CALCIUM (test code = 09D) 9.1 mg/dL    8.3-9.5                   

 

             BILI TOTAL (test code = 11A) 1.4 mg/dL    0.2-1.0      H           

 

 

             PROTEIN (test code = 07D) 7.0 g/dL     6.4-8.2                   

 

             ALBUMIN (test code = 08D) 3.2 g/dL     3.5-4.8      L            

 

             GLOBULIN (test code = GLB) 3.8 g/dL     1.5-3.8                   

 

             ALB/GLOB (test code = AGRR) 0.8          1.0-2.6      L            

 

             ALK PHOS (test code = 35A) 111 IU/L                         

 

             AST (test code = 30A) 18 IU/L      <=42                      

 

             ALT (test code = 31A) 43 IU/L      <=78                      



PHOSPHORUS (P04)2019 06:13:00





             Test Item    Value        Reference Range Interpretation Comments

 

             PHOSPHORUS (test code = 43D) 4.0 mg/dL    2.7-4.6                  

 



CBC (INCLUDES AUTOMATED DIFFERENTIAL)2019 05:48:00





             Test Item    Value        Reference Range Interpretation Comments

 

             WBC (test code = WBC) 9.4 10\S\3/uL 4.5-11.0                  

 

             RBC (test code = RBC) 3.73 10\S\6/uL 4.20-5.60    L            

 

             HGB (test code = HBG) 10.8 g/dL    12.0-15.5    L            

 

             HCT (test code = HCT) 33.8 %       35.0-44.0    L            

 

             MCV (test code = MCV) 90.6 fL      81.0-99.0                 

 

             MCH (test code = MCH) 29.0 pg      27.0-31.0                 

 

             MCHC (test code = MCHC) 32.0 g/dL    32.0-36.0                 

 

             RDW (test code = RDW) 15.1 %       11.5-14.5    H            

 

             PLT (test code = PLT) 189 10\S\3/uL 130-400                   

 

             MPV (test code = MPV) 11.8 fL      9.4-12.4                  

 

             NEUTROP # (test code = NE#) 7.0 10\S\3/uL 1.6-8.0                  

 

 

             LYMPH # (test code = LY#) 1.6 10\S\3/uL 1.1-3.5                   

 

             MONOCYTE # (test code = MO#) 0.7 10\S\3/uL 0.0-1.1                 

  

 

             EOSINOPH # (test code = EO#) 0.1 10\S\3/uL 0.0-0.7                 

  

 

             BASOPHIL # (test code = BA#) 0.0 10\S\3/uL 0.0-0.3                 

  

 

             IG # (test code = IG#) 0.06 10\S\3/uL 0.00-0.06                 

 

             NRBC # (test code = NRBC#) 0.00 10\S\3/uL 0.00-0.01                

 

 

             NEUTROPH % (test code = NE%) 74.5 %       35.0-73.0    H           

 

 

             LYMPH % (test code = LY%) 16.5 %       20.0-55.0    L            

 

             MONO % (test code = MO%) 7.0 %        2.5-10.0                  

 

             EOSINOPH % (test code = EO%) 1.1 %        0.0-5.0                  

 

 

             BASOPHIL % (test code = BA%) 0.3 %        0.0-2.0                  

 

 

             IG % (test code = IG%) 0.6 %        0.0-0.8                   

 

             NRBC% (test code = NRBC%) 0.0 %        0.0-0.2                   

 

             MANDIFF (test code = MDIFF) NO           NO                        

 

             RBC MORPH (test code = RBCMOR)              NORMAL                 

   



COMPREHENSIVE METABOLIC [id number] 05:30:00





             Test Item    Value        Reference Range Interpretation Comments

 

             GLUCOSE (test code = 06D) 122 mg/dL           H            

 

             SODIUM (test code = 01A) 140 mmol/L   136-145                   

 

             POTASSIUM (test code = 01B) 3.8 mmol/L   3.6-5.1                   

 

             CHLORIDE (test code = 04A) 100 mmol/L                       

 

             CO2 (test code = 02A) 32 mmol/L    22-32                     

 

             ANION GAP (test code = ANG) 11.8 mmol/L                            

 

             BUN (test code = 05D) 29 mg/dL     7-18         H            

 

             CREATININE (test code = 03E) 1.5 mg/dL    0.4-1.1      H           

 

 

             BUN/CREA (test code = BCR) 20           12-20                     

 

             CALCIUM (test code = 09D) 9.0 mg/dL    8.3-9.5                   

 

             BILI TOTAL (test code = 11A) 1.3 mg/dL    0.2-1.0      H           

 

 

             PROTEIN (test code = 07D) 7.1 g/dL     6.4-8.2                   

 

             ALBUMIN (test code = 08D) 3.5 g/dL     3.5-4.8                   

 

             GLOBULIN (test code = GLB) 3.6 g/dL     1.5-3.8                   

 

             ALB/GLOB (test code = AGRR) 1.0          1.0-2.6                   

 

             ALK PHOS (test code = 35A) 119 IU/L                         

 

             AST (test code = 30A) 22 IU/L      <=42                      

 

             ALT (test code = 31A) 49 IU/L      <=78                      



CBC (INCLUDES AUTOMATED DIFFERENTIAL)2019 05:18:00





             Test Item    Value        Reference Range Interpretation Comments

 

             WBC (test code = WBC) 9.0 10\S\3/uL 4.5-11.0                  

 

             RBC (test code = RBC) 3.94 10\S\6/uL 4.20-5.60    L            

 

             HGB (test code = HBG) 11.4 g/dL    12.0-15.5    L            

 

             HCT (test code = HCT) 35.8 %       35.0-44.0                 

 

             MCV (test code = MCV) 90.9 fL      81.0-99.0                 

 

             MCH (test code = MCH) 28.9 pg      27.0-31.0                 

 

             MCHC (test code = MCHC) 31.8 g/dL    32.0-36.0    L            

 

             RDW (test code = RDW) 14.8 %       11.5-14.5    H            

 

             PLT (test code = PLT) 164 10\S\3/uL 130-400                   

 

             MPV (test code = MPV) 11.6 fL      9.4-12.4                  

 

             NEUTROP # (test code = NE#) 6.8 10\S\3/uL 1.6-8.0                  

 

 

             LYMPH # (test code = LY#) 1.4 10\S\3/uL 1.1-3.5                   

 

             MONOCYTE # (test code = MO#) 0.6 10\S\3/uL 0.0-1.1                 

  

 

             EOSINOPH # (test code = EO#) 0.1 10\S\3/uL 0.0-0.7                 

  

 

             BASOPHIL # (test code = BA#) 0.0 10\S\3/uL 0.0-0.3                 

  

 

             IG # (test code = IG#) 0.06 10\S\3/uL 0.00-0.06                 

 

             NRBC # (test code = NRBC#) 0.00 10\S\3/uL 0.00-0.01                

 

 

             NEUTROPH % (test code = NE%) 75.6 %       35.0-73.0    H           

 

 

             LYMPH % (test code = LY%) 15.9 %       20.0-55.0    L            

 

             MONO % (test code = MO%) 6.5 %        2.5-10.0                  

 

             EOSINOPH % (test code = EO%) 0.9 %        0.0-5.0                  

 

 

             BASOPHIL % (test code = BA%) 0.4 %        0.0-2.0                  

 

 

             IG % (test code = IG%) 0.7 %        0.0-0.8                   

 

             NRBC% (test code = NRBC%) 0.0 %        0.0-0.2                   

 

             MANDIFF (test code = MDIFF) NO           NO                        

 

             RBC MORPH (test code = RBCMOR)              NORMAL                 

   



URINALYSIS WITH [id number] 06:44:00





             Test Item    Value        Reference Range Interpretation Comments

 

             COLOR (test code = COLU) YELLOW       YELLOW                    

 

             CLARITY (test code = CLA) CLOUDY       CLEAR        A            

 

             GLUCOSE UR (test code = UA NEGATIVE     NEGATIVE                  



             GLUCOSE)                                            

 

             BILI UR (test code = BILE) NEGATIVE     NEGATIVE                  

 

             KETONES UR (test code = ACE) NEGATIVE     NEGATIVE                 

 

 

             SP GRAVITY (test code = SPGR) 1.014        1.005-1.030             

  

 

             PH UR (test code = PH) 6.0          4.5-8.0                   

 

             PROTEIN UR (test code = PU) TRACE        NEGATIVE     A            

 

             UROBIL UR (test code = UROQ) 0.2 EU/dL    0.2-1.0                  

 

 

             NITRITE UR (test code = NEGATIVE     NEGATIVE                  



             NITRITE)                                            

 

             BLOOD UR (test code = UA BLOOD) TRACE        NEGATIVE     A        

    

 

             LEUK ES UR (test code = LEUK) 2+           NEGATIVE     A          

  

 

             WBC UR (test code = UWBC) 12 /HPF      0-5          H            

 

             RBC UR (test code = URBC) 1 /HPF       0-2                       

 

             EPITH  UR (test code = UEPC) FEW /LPF     FEW                      

 

 

             BACTERIA UR (test code = UBACT) MODERATE /HPF NONE         A       

     

 

             CAST UR (test code = CAST)  /LPF        NONE                      

 

             CRYSTAL UR (test code = CRYU)  / LPF       NONE                    

  

 

             MUCUS UR (test code = MUC)  / HPF       NONE                      

 

             AMORPH UR (test code = YASMEEN)  / HPF       NONE                     

 

 

             TRICH UR (test code = UTRICH)  /HPF        NONE                    

  

 

             YEAST UR (test code = UY)  /HPF        NONE                      

 

             SPERM UR (test code = USPERM)  /HPF        NONE                    

  



COMPREHENSIVE METABOLIC [id number] 05:24:00





             Test Item    Value        Reference Range Interpretation Comments

 

             GLUCOSE (test code = 06D) 105 mg/dL           H            

 

             SODIUM (test code = 01A) 138 mmol/L   136-145                   

 

             POTASSIUM (test code = 01B) 4.0 mmol/L   3.6-5.1                   

 

             CHLORIDE (test code = 04A) 104 mmol/L                       

 

             CO2 (test code = 02A) 25 mmol/L    22-32                     

 

             ANION GAP (test code = ANG) 13.0 mmol/L                            

 

             BUN (test code = 05D) 29 mg/dL     7-18         H            

 

             CREATININE (test code = 03E) 1.5 mg/dL    0.4-1.1      H           

 

 

             BUN/CREA (test code = BCR) 19           12-20                     

 

             CALCIUM (test code = 09D) 8.4 mg/dL    8.3-9.5                   

 

             BILI TOTAL (test code = 11A) 0.7 mg/dL    0.2-1.0                  

 

 

             PROTEIN (test code = 07D) 6.2 g/dL     6.4-8.2      L            

 

             ALBUMIN (test code = 08D) 3.1 g/dL     3.5-4.8      L            

 

             GLOBULIN (test code = GLB) 3.1 g/dL     1.5-3.8                   

 

             ALB/GLOB (test code = AGRR) 1.0          1.0-2.6                   

 

             ALK PHOS (test code = 35A) 104 IU/L                         

 

             AST (test code = 30A) 22 IU/L      <=42                      

 

             ALT (test code = 31A) 42 IU/L      <=78                      



CBC (INCLUDES AUTOMATED DIFFERENTIAL)2019 05:07:00





             Test Item    Value        Reference Range Interpretation Comments

 

             WBC (test code = WBC) 8.6 10\S\3/uL 4.5-11.0                  

 

             RBC (test code = RBC) 3.72 10\S\6/uL 4.20-5.60    L            

 

             HGB (test code = HBG) 10.8 g/dL    12.0-15.5    L            

 

             HCT (test code = HCT) 33.7 %       35.0-44.0    L            

 

             MCV (test code = MCV) 90.6 fL      81.0-99.0                 

 

             MCH (test code = MCH) 29.0 pg      27.0-31.0                 

 

             MCHC (test code = MCHC) 32.0 g/dL    32.0-36.0                 

 

             RDW (test code = RDW) 14.7 %       11.5-14.5    H            

 

             PLT (test code = PLT) 152 10\S\3/uL 130-400                   

 

             MPV (test code = MPV) 11.4 fL      9.4-12.4                  

 

             NEUTROP # (test code = NE#) 6.2 10\S\3/uL 1.6-8.0                  

 

 

             LYMPH # (test code = LY#) 1.6 10\S\3/uL 1.1-3.5                   

 

             MONOCYTE # (test code = MO#) 0.5 10\S\3/uL 0.0-1.1                 

  

 

             EOSINOPH # (test code = EO#) 0.2 10\S\3/uL 0.0-0.7                 

  

 

             BASOPHIL # (test code = BA#) 0.1 10\S\3/uL 0.0-0.3                 

  

 

             IG # (test code = IG#) 0.03 10\S\3/uL 0.00-0.06                 

 

             NRBC # (test code = NRBC#) 0.00 10\S\3/uL 0.00-0.01                

 

 

             NEUTROPH % (test code = NE%) 72.8 %       35.0-73.0                

 

 

             LYMPH % (test code = LY%) 18.5 %       20.0-55.0    L            

 

             MONO % (test code = MO%) 5.8 %        2.5-10.0                  

 

             EOSINOPH % (test code = EO%) 2.0 %        0.0-5.0                  

 

 

             BASOPHIL % (test code = BA%) 0.6 %        0.0-2.0                  

 

 

             IG % (test code = IG%) 0.3 %        0.0-0.8                   

 

             NRBC% (test code = NRBC%) 0.0 %        0.0-0.2                   

 

             MANDIFF (test code = MDIFF) NO           NO                        

 

             RBC MORPH (test code = RBCMOR)              NORMAL                 

   



U/S KIDNEY (RENAL)2019 23:20:42LOCATION: [id number]: 63-year-old female 
who presents with acute nontraumatic kidney injury.COMMENT:Sonographic imaging 
of this patient's retroperitoneum was performed.The right kidney measures 9.9 x 
5.9 x 6.4 cm with a 13 mm cortical thickness. Acyst is seen in the upper pole 
measuring 23 x 19 x 19 mm, and a second cyst isseen in the lower pole measuring 
18 x 16 x 16 mm.The left kidney measures 9.4 x 5.3 x 5.6 cm with a 11 mm 
cortical thickness. Nocysts or masses are seen in the left kidney.Cortical 
echotexture of the kidneys otherwise is unremarkable.There is no evidence of 
hydronephrosis of either kidney.In the urinary bladder only the left urine jet 
was observed on the color flowstudy. The bladder otherwise is 
unremarkable.IMPRESSION:Cystic changes are seen in the upper pole and lower pole
ofthis patient'sright kidney. No gross abnormalities are seen elsewhere in 
either kidney.The urinary bladder is unremarkable. Only the left urine jet was 
observed onthe color flow study.TROPONIN [id number] 07:14:00





             Test Item    Value        Reference Range Interpretation Comments

 

             TROPONIN I (test code = A84) <0.015 ng/mL 0.000-0.045              

 



COMPREHENSIVE METABOLIC [id number] 05:28:00





             Test Item    Value        Reference Range Interpretation Comments

 

             GLUCOSE (test code = 06D) 110 mg/dL           H            

 

             SODIUM (test code = 01A) 138 mmol/L   136-145                   

 

             POTASSIUM (test code = 01B) 3.9 mmol/L   3.6-5.1                   

 

             CHLORIDE (test code = 04A) 106 mmol/L                       

 

             CO2 (test code = 02A) 24 mmol/L    22-32                     

 

             ANION GAP (test code = ANG) 11.9 mmol/L                            

 

             BUN (test code = 05D) 22 mg/dL     7-18         H            

 

             CREATININE (test code = 03E) 1.5 mg/dL    0.4-1.1      H           

 

 

             BUN/CREA (test code = BCR) 15           12-20                     

 

             CALCIUM (test code = 09D) 8.2 mg/dL    8.3-9.5      L            

 

             BILI TOTAL (test code = 11A) 0.6 mg/dL    0.2-1.0                  

 

 

             PROTEIN (test code = 07D) 6.0 g/dL     6.4-8.2      L            

 

             ALBUMIN (test code = 08D) 2.9 g/dL     3.5-4.8      L            

 

             GLOBULIN (test code = GLB) 3.1 g/dL     1.5-3.8                   

 

             ALB/GLOB (test code = AGRR) 0.9          1.0-2.6      L            

 

             ALK PHOS (test code = 35A) 96 IU/L                          

 

             AST (test code = 30A) 19 IU/L      <=42                      

 

             ALT (test code = 31A) 35 IU/L      <=78                      



CBC (INCLUDES AUTOMATED DIFFERENTIAL)2019 05:14:00





             Test Item    Value        Reference Range Interpretation Comments

 

             WBC (test code = WBC) 7.0 10\S\3/uL 4.5-11.0                  

 

             RBC (test code = RBC) 3.64 10\S\6/uL 4.20-5.60    L            

 

             HGB (test code = HBG) 10.6 g/dL    12.0-15.5    L            

 

             HCT (test code = HCT) 33.5 %       35.0-44.0    L            

 

             MCV (test code = MCV) 92.0 fL      81.0-99.0                 

 

             MCH (test code = MCH) 29.1 pg      27.0-31.0                 

 

             MCHC (test code = MCHC) 31.6 g/dL    32.0-36.0    L            

 

             RDW (test code = RDW) 14.9 %       11.5-14.5    H            

 

             PLT (test code = PLT) 145 10\S\3/uL 130-400                   

 

             MPV (test code = MPV) 11.4 fL      9.4-12.4                  

 

             NEUTROP # (test code = NE#) 4.2 10\S\3/uL 1.6-8.0                  

 

 

             LYMPH # (test code = LY#) 2.1 10\S\3/uL 1.1-3.5                   

 

             MONOCYTE # (test code = MO#) 0.5 10\S\3/uL 0.0-1.1                 

  

 

             EOSINOPH # (test code = EO#) 0.1 10\S\3/uL 0.0-0.7                 

  

 

             BASOPHIL # (test code = BA#) 0.0 10\S\3/uL 0.0-0.3                 

  

 

             IG # (test code = IG#) 0.04 10\S\3/uL 0.00-0.06                 

 

             NRBC # (test code = NRBC#) 0.00 10\S\3/uL 0.00-0.01                

 

 

             NEUTROPH % (test code = NE%) 59.7 %       35.0-73.0                

 

 

             LYMPH % (test code = LY%) 30.1 %       20.0-55.0                 

 

             MONO % (test code = MO%) 7.0 %        2.5-10.0                  

 

             EOSINOPH % (test code = EO%) 2.0 %        0.0-5.0                  

 

 

             BASOPHIL % (test code = BA%) 0.6 %        0.0-2.0                  

 

 

             IG % (test code = IG%) 0.6 %        0.0-0.8                   

 

             NRBC% (test code = NRBC%) 0.0 %        0.0-0.2                   

 

             MANDIFF (test code = MDIFF) NO           NO                        

 

             RBC MORPH (test code = RBCMOR)              NORMAL                 

   



COMPREHENSIVE METABOLIC PAN2019-08-15 04:58:00





             Test Item    Value        Reference Range Interpretation Comments

 

             GLUCOSE (test code = 06D) 112 mg/dL           H            

 

             SODIUM (test code = 01A) 143 mmol/L   136-145                   

 

             POTASSIUM (test code = 01B) 4.4 mmol/L   3.6-5.1                   

 

             CHLORIDE (test code = 04A) 108 mmol/L          H            

 

             CO2 (test code = 02A) 27 mmol/L    22-32                     

 

             ANION GAP (test code = ANG) 12.4 mmol/L                            

 

             BUN (test code = 05D) 19 mg/dL     7-18         H            

 

             CREATININE (test code = 03E) 1.4 mg/dL    0.4-1.1      H           

 

 

             BUN/CREA (test code = BCR) 14           12-20                     

 

             CALCIUM (test code = 09D) 8.5 mg/dL    8.3-9.5                   

 

             BILI TOTAL (test code = 11A) 0.9 mg/dL    0.2-1.0                  

 

 

             PROTEIN (test code = 07D) 6.2 g/dL     6.4-8.2      L            

 

             ALBUMIN (test code = 08D) 2.9 g/dL     3.5-4.8      L            

 

             GLOBULIN (test code = GLB) 3.3 g/dL     1.5-3.8                   

 

             ALB/GLOB (test code = AGRR) 0.9          1.0-2.6      L            

 

             ALK PHOS (test code = 35A) 95 IU/L                          

 

             AST (test code = 30A) 19 IU/L      <=42                      

 

             ALT (test code = 31A) 40 IU/L      <=78                      



CBC (INCLUDES AUTOMATED DIFFERENTIAL)2019-08-15 04:51:00





             Test Item    Value        Reference Range Interpretation Comments

 

             WBC (test code = WBC) 8.2 10\S\3/uL 4.5-11.0                  

 

             RBC (test code = RBC) 3.48 10\S\6/uL 4.20-5.60    L            

 

             HGB (test code = HBG) 10.3 g/dL    12.0-15.5    L            

 

             HCT (test code = HCT) 32.4 %       35.0-44.0    L            

 

             MCV (test code = MCV) 93.1 fL      81.0-99.0                 

 

             MCH (test code = MCH) 29.6 pg      27.0-31.0                 

 

             MCHC (test code = MCHC) 31.8 g/dL    32.0-36.0    L            

 

             RDW (test code = RDW) 15.5 %       11.5-14.5    H            

 

             PLT (test code = PLT) 163 10\S\3/uL 130-400                   

 

             MPV (test code = MPV) 11.8 fL      9.4-12.4                  

 

             NEUTROP # (test code = NE#) 5.3 10\S\3/uL 1.6-8.0                  

 

 

             LYMPH # (test code = LY#) 2.0 10\S\3/uL 1.1-3.5                   

 

             MONOCYTE # (test code = MO#) 0.6 10\S\3/uL 0.0-1.1                 

  

 

             EOSINOPH # (test code = EO#) 0.2 10\S\3/uL 0.0-0.7                 

  

 

             BASOPHIL # (test code = BA#) 0.0 10\S\3/uL 0.0-0.3                 

  

 

             IG # (test code = IG#) 0.03 10\S\3/uL 0.00-0.06                 

 

             NRBC # (test code = NRBC#) 0.00 10\S\3/uL 0.00-0.01                

 

 

             NEUTROPH % (test code = NE%) 65.5 %       35.0-73.0                

 

 

             LYMPH % (test code = LY%) 24.2 %       20.0-55.0                 

 

             MONO % (test code = MO%) 7.2 %        2.5-10.0                  

 

             EOSINOPH % (test code = EO%) 2.2 %        0.0-5.0                  

 

 

             BASOPHIL % (test code = BA%) 0.5 %        0.0-2.0                  

 

 

             IG % (test code = IG%) 0.4 %        0.0-0.8                   

 

             NRBC% (test code = NRBC%) 0.0 %        0.0-0.2                   

 

             MANDIFF (test code = MDIFF) NO           NO                        



CARDIAC [id number] 23:10:00





             Test Item    Value        Reference Range Interpretation Comments

 

             TROPONIN I (test code = A84) <0.015 ng/mL 0.000-0.045              

 



U/S VENOUS DOPPLER IVON LOW [id number] 22:23:14LOCATION: [id number]: 
63-year-old female who presents with bilateral leg swelling.COMMENT:    Sonogra
[facility] imaging of the venous anatomy in both of this patient's legs wasobtained 
utilizing grayscale, color-flow, and Doppler waveform imagingmodalities.The 
common femoral veins, superficial femoral veins, popliteal veins, 
posteriortibial veins, anterior tibial veins, and peroneal veins in both legs 
wereincluded in the study.The anatomy exhibits normal compressibility on 
grayscale study. No suspiciousfilling defects are seen on the color flow study. 
Appropriate waveform responseas are noted on the Doppler study during 
augmentation maneuvers and duringquiet respiration.    IMPRESSION:There is no 
sonographic evidence of venous thrombosis in either of thispatient's legs.
CARDIAC [id number] 16:50:00





             Test Item    Value        Reference Range Interpretation Comments

 

             TROPONIN I (test code = A84) <0.015 ng/mL 0.000-0.045              

 



BRAIN NATRIURETIC [id number] 14:39:00





             Test Item    Value        Reference Range Interpretation Comments

 

             proBNP (test code = PBNP) 1337 pg/mL   0-125        H            



THYROID PANEL/SCREEN (TSH)2019 12:05:00





             Test Item    Value        Reference Range Interpretation Comments

 

             TSH (test code = A57) 0.989 uIU/mL 0.358-3.740               



[id number] 12:02:00





             Test Item    Value        Reference Range Interpretation Comments

 

             CPK (test code = 32A) 98 IU/L                          



AMYLASE AND [id number] 12:02:00





             Test Item    Value        Reference Range Interpretation Comments

 

             AMYLASE (test code = 10A) 19 U/L              L            

 

             LIPASE (test code = 60A) 67 IU/L             L            



COMPREHENSIVE METABOLIC [id number] 12:02:00





             Test Item    Value        Reference Range Interpretation Comments

 

             GLUCOSE (test code = 06D) 109 mg/dL           H            

 

             SODIUM (test code = 01A) 142 mmol/L   136-145                   

 

             POTASSIUM (test code = 01B) 4.2 mmol/L   3.6-5.1                   

 

             CHLORIDE (test code = 04A) 109 mmol/L          H            

 

             CO2 (test code = 02A) 26 mmol/L    22-32                     

 

             ANION GAP (test code = ANG) 11.2 mmol/L                            

 

             BUN (test code = 05D) 16 mg/dL     7-18                      

 

             CREATININE (test code = 03E) 1.3 mg/dL    0.4-1.1      H           

 

 

             BUN/CREA (test code = BCR) 13           12-20                     

 

             CALCIUM (test code = 09D) 8.7 mg/dL    8.3-9.5                   

 

             BILI TOTAL (test code = 11A) 1.3 mg/dL    0.2-1.0      H           

 

 

             PROTEIN (test code = 07D) 6.5 g/dL     6.4-8.2                   

 

             ALBUMIN (test code = 08D) 3.2 g/dL     3.5-4.8      L            

 

             GLOBULIN (test code = GLB) 3.3 g/dL     1.5-3.8                   

 

             ALB/GLOB (test code = AGRR) 1.0          1.0-2.6                   

 

             ALK PHOS (test code = 35A) 101 IU/L                         

 

             AST (test code = 30A) 21 IU/L      <=42                      

 

             ALT (test code = 31A) 41 IU/L      <=78                      



TROPONIN -[id number] 11:57:00





             Test Item    Value        Reference Range Interpretation Comments

 

             TROPONIN I (test code = A84) <0.015 ng/mL 0.000-0.045              

 



XR CHEST 1 VIEW [id number] 11:55:22EXAM: XR CHEST 1 VIEW 
PORTABLE.LOCATION: D4.HISTORY: [id number]: Chest pain.COMPARISON: Radiograph dated
2019.TECHNIQUE: Single AP view of the chest was obtained. FINDINGS:The 
heart is enlarged in size. Small left pleural effusion and left basilaropacities
are present. There is elevation of the right hemidiaphragm. No acuteosseous 
abnormality is identified.IMPRESSION:Small left pleural effusion with left 
basilar opacities, which may representatelectasis or infiltrates.Cardiomegaly.
PRO TIME AND [id number] 11:43:00





             Test Item    Value        Reference Range Interpretation Comments

 

             PT (test code = 13.4 s       9.8-13.6                  



             TT)                                                 

 

             INR (test code = 1.2                                    



             INR)                                                

 

             INRH (test code =      **** SUGGESTED                           



             INRH)        THERAPEUTIC RANGE FOR INR:                           



                          ****    2.5 - 3.5 **  For                           



                          Patients with Prosthetic                           



                          Valves or Patients with                           



                                          recurrent                           



                          Thromboembolic Events **                           



                          2.0 - 3.0 ** For Most Other                           



                          Applications **                           

 

             PTT (test code = 30.4 s       20.2-38.0                 



             PTT)                                                

 

             PTTH (test code = **** To monitor the                           



             PTTH)        effectiveness of heparin,                           



                          we offer the Anti-Xa                           



                          (Heparin Assay). It can be                           



                          used for either                           



                          unfractionated or LMW                           



                          Heparin. Order Code is                           



                          ANTI-XA ****                           



CBC (INCLUDES AUTOMATED DIFFERENTIAL)2019 11:36:00





             Test Item    Value        Reference Range Interpretation Comments

 

             WBC (test code = WBC) 7.6 10\S\3/uL 4.5-11.0                  

 

             RBC (test code = RBC) 3.53 10\S\6/uL 4.20-5.60    L            

 

             HGB (test code = HBG) 10.3 g/dL    12.0-15.5    L            

 

             HCT (test code = HCT) 33.5 %       35.0-44.0    L            

 

             MCV (test code = MCV) 94.9 fL      81.0-99.0                 

 

             MCH (test code = MCH) 29.2 pg      27.0-31.0                 

 

             MCHC (test code = MCHC) 30.7 g/dL    32.0-36.0    L            

 

             RDW (test code = RDW) 15.4 %       11.5-14.5    H            

 

             PLT (test code = PLT) 164 10\S\3/uL 130-400                   

 

             MPV (test code = MPV) 11.7 fL      9.4-12.4                  

 

             NEUTROP # (test code = NE#) 5.6 10\S\3/uL 1.6-8.0                  

 

 

             LYMPH # (test code = LY#) 1.4 10\S\3/uL 1.1-3.5                   

 

             MONOCYTE # (test code = MO#) 0.4 10\S\3/uL 0.0-1.1                 

  

 

             EOSINOPH # (test code = EO#) 0.1 10\S\3/uL 0.0-0.7                 

  

 

             BASOPHIL # (test code = BA#) 0.0 10\S\3/uL 0.0-0.3                 

  

 

             IG # (test code = IG#) 0.04 10\S\3/uL 0.00-0.06                 

 

             NRBC # (test code = NRBC#) 0.00 10\S\3/uL 0.00-0.01                

 

 

             NEUTROPH % (test code = NE%) 73.9 %       35.0-73.0    H           

 

 

             LYMPH % (test code = LY%) 18.0 %       20.0-55.0    L            

 

             MONO % (test code = MO%) 5.7 %        2.5-10.0                  

 

             EOSINOPH % (test code = EO%) 1.6 %        0.0-5.0                  

 

 

             BASOPHIL % (test code = BA%) 0.3 %        0.0-2.0                  

 

 

             IG % (test code = IG%) 0.5 %        0.0-0.8                   

 

             NRBC% (test code = NRBC%) 0.0 %        0.0-0.2                   

 

             MANDIFF (test code = MDIFF) NO           NO                        

 

             RBC MORPH (test code = RBCMOR)              NORMAL                 

   



CBC WITH  [id number] 15:29:00





             Test Item    Value        Reference Range Interpretation Comments

 

             WBC (test code = WBC) 9.7 10\S\3/uL 4.5-11.0                  

 

             RBC (test code = RBC) 3.73 10\S\6/uL 4.20-5.60    L            

 

             HGB (test code = HBG) 11.0 g/dL    12.0-15.5    L            

 

             HCT (test code = HCT) 34.1 %       35.0-44.0    L            

 

             MCV (test code = MCV) 91.4 fL      81.0-99.0                 

 

             MCH (test code = MCH) 29.5 pg      27.0-31.0                 

 

             MCHC (test code = MCHC) 32.3 g/dL    32.0-36.0                 

 

             RDW (test code = RDW) 14.2 %       11.5-14.5                 

 

             PLT (test code = PLT) 116 10\S\3/uL 130-400      L            

 

             MPV (test code = MPV) 11.8 fL      9.4-12.4                  

 

             NEUTROP # (test code = NE#) 7.9 10\S\3/uL 1.6-8.0                  

 

 

             LYMPH # (test code = LY#) 1.0 10\S\3/uL 1.1-3.5      L            

 

             MONOCYTE # (test code = 0.6 10\S\3/uL 0.0-1.1                   



             MO#)                                                

 

             EOSINOPH # (test code = 0.1 10\S\3/uL 0.0-0.7                   



             EO#)                                                

 

             BASOPHIL # (test code = 0.0 10\S\3/uL 0.0-0.3                   



             BA#)                                                

 

             IG # (test code = IG#) 0.04 10\S\3/uL 0.00-0.06                 

 

             NRBC # (test code = NRBC#) 0.00 10\S\3/uL 0.00-0.01                

 

 

             NEUTROPH % (test code = 81.6 %       35.0-73.0    H            



             NE%)                                                

 

             LYMPH % (test code = LY%) 10.3 %       20.0-55.0    L            

 

             MONO % (test code = MO%) 6.4 %        2.5-10.0                  

 

             EOSINOPH % (test code = 1.0 %        0.0-5.0                   



             EO%)                                                

 

             BASOPHIL % (test code = 0.3 %        0.0-2.0                   



             BA%)                                                

 

             IG % (test code = IG%) 0.4 %        0.0-0.8                   

 

             NRBC% (test code = NRBC%) 0.0 %        0.0-0.2                   

 

             PLT EST (test code = ADEQUATE     ADEQUATE                  



             PLTEST)                                             

 

             PLT MORPH (test code = NORMAL (1.5-3 um) NORMAL                    



             PLTMOR)                                             



BASIC METABOLIC [id number] 15:26:00





             Test Item    Value        Reference Range Interpretation Comments

 

             GLUCOSE (test code = 06D) 118 mg/dL           H            

 

             SODIUM (test code = 01A) 136 mmol/L   136-145                   

 

             POTASSIUM (test code = 01B) 4.2 mmol/L   3.6-5.1                   

 

             CHLORIDE (test code = 04A) 106 mmol/L                       

 

             CO2 (test code = 02A) 24 mmol/L    22-32                     

 

             ANION GAP (test code = ANG) 10.2 mmol/L                            

 

             BUN (test code = 05D) 38 mg/dL     7-18         H            

 

             CREATININE (test code = 03E) 1.6 mg/dL    0.4-1.1      H           

 

 

             BUN/CREA (test code = BCR) 23           12-20        H            

 

             CALCIUM (test code = 09D) 9.2 mg/dL    8.3-9.5                   



CARDIAC [id number] 06:04:00





             Test Item    Value        Reference Range Interpretation Comments

 

             TROPONIN I (test code = A84) <0.015 ng/mL 0.000-0.045              

 



BASIC METABOLIC [id number] 05:52:00





             Test Item    Value        Reference Range Interpretation Comments

 

             GLUCOSE (test code = 06D) 171 mg/dL           H            

 

             SODIUM (test code = 01A) 138 mmol/L   136-145                   

 

             POTASSIUM (test code = 01B) 4.0 mmol/L   3.6-5.1                   

 

             CHLORIDE (test code = 04A) 107 mmol/L                       

 

             CO2 (test code = 02A) 23 mmol/L    22-32                     

 

             ANION GAP (test code = ANG) 12.0 mmol/L                            

 

             BUN (test code = 05D) 19 mg/dL     7-18         H            

 

             CREATININE (test code = 03E) 1.2 mg/dL    0.4-1.1      H           

 

 

             BUN/CREA (test code = BCR) 15           12-20                     

 

             CALCIUM (test code = 09D) 8.5 mg/dL    8.3-9.5                   



CBC (INCLUDES AUTOMATED DIFFERENTIAL)2019 05:30:00





             Test Item    Value        Reference Range Interpretation Comments

 

             WBC (test code = WBC) 14.0 10\S\3/uL 4.5-11.0     H            

 

             RBC (test code = RBC) 3.64 10\S\6/uL 4.20-5.60    L            

 

             HGB (test code = HBG) 10.8 g/dL    12.0-15.5    L            

 

             HCT (test code = HCT) 33.2 %       35.0-44.0    L            

 

             MCV (test code = MCV) 91.2 fL      81.0-99.0                 

 

             MCH (test code = MCH) 29.7 pg      27.0-31.0                 

 

             MCHC (test code = MCHC) 32.5 g/dL    32.0-36.0                 

 

             RDW (test code = RDW) 14.0 %       11.5-14.5                 

 

             PLT (test code = PLT) 143 10\S\3/uL 130-400                   

 

             MPV (test code = MPV) 11.4 fL      9.4-12.4                  

 

             NEUTROP # (test code = NE#) 12.1 10\S\3/uL 1.6-8.0      H          

  

 

             LYMPH # (test code = LY#) 0.9 10\S\3/uL 1.1-3.5      L            

 

             MONOCYTE # (test code = MO#) 0.9 10\S\3/uL 0.0-1.1                 

  

 

             EOSINOPH # (test code = EO#) 0.0 10\S\3/uL 0.0-0.7                 

  

 

             BASOPHIL # (test code = BA#) 0.0 10\S\3/uL 0.0-0.3                 

  

 

             IG # (test code = IG#) 0.10 10\S\3/uL 0.00-0.06    H            

 

             NRBC # (test code = NRBC#) 0.00 10\S\3/uL 0.00-0.01                

 

 

             NEUTROPH % (test code = NE%) 86.2 %       35.0-73.0    H           

 

 

             LYMPH % (test code = LY%) 6.3 %        20.0-55.0    L            

 

             MONO % (test code = MO%) 6.6 %        2.5-10.0                  

 

             EOSINOPH % (test code = EO%) 0.0 %        0.0-5.0                  

 

 

             BASOPHIL % (test code = BA%) 0.2 %        0.0-2.0                  

 

 

             IG % (test code = IG%) 0.7 %        0.0-0.8                   

 

             NRBC% (test code = NRBC%) 0.0 %        0.0-0.2                   

 

             MANDIFF (test code = MDIFF) NO           NO                        

 

             RBC MORPH (test code = RBCMOR)              NORMAL                 

   



CARDIAC [id number] 23:08:00





             Test Item    Value        Reference Range Interpretation Comments

 

             TROPONIN I (test code = A84) <0.015 ng/mL 0.000-0.045              

 



CT DISSECTION [id number] 19:26:35Exam: CT thorax PE protocol.Location: [facility]
12History: [id number]: Chest painTechnique: Enhanced spiral slices were taken from
the apices of the lungs,through the upper abdomen utilizing a pulmonary embolism
protocol. Multiplanarreformations were performed. 100cc of Omnipaque were used. 
One or more of thefollowing radiation dose reduction techniques was used: 
automated exposurecontrol, adjustment of mA and/or KV according to patient size,
and/orutilization of iterative reconstruction technique.Findings:Nofilling 
defects are seen in the main pulmonary arteries. The pulmonaryvasculature is 
normal. No pulmonary venous congestion or arterial hypertensionis seen.The lungs
are clear. No infiltration or effusion is seen. No mass or nodule isseen.The 
mediastinum and the pulmonary kandis are normal. Calcified granulomas arenoted. No
lymphadenopathy is present. The heart size is  normal.No pericardialeffusion is
seen.The  visualized upper abdominal organs are unremarkable.Impression:1. 
Negative for pulmonary embolism.2. No acute disease.THYROID PANEL/SCREEN (TSH)
2019 18:29:00





             Test Item    Value        Reference Range Interpretation Comments

 

             TSH (test code = A57) 0.706 uIU/mL 0.358-3.740               



LIVER [id number] 18:28:00





             Test Item    Value        Reference Range Interpretation Comments

 

             BILI TOTAL (test code = 11A) 0.9 mg/dL    0.2-1.0                  

 

 

             BILI DIRCT (test code = 12A) 0.2 mg/dL    0.0-0.2                  

 

 

             BILI INDIR (test code = BILII) 0.7 mg/dL    <=0.8                  

   

 

             PROTEIN (test code = 07D) 7.7 g/dL     6.4-8.2                   

 

             ALBUMIN (test code = 08D) 3.8 g/dL     3.5-4.8                   

 

             GLOBULIN (test code = GLB) 3.9 g/dL     1.5-3.8      H            

 

             ALB/GLOB (test code = AGRR) 1.0          1.0-2.6                   

 

             ALK PHOS (test code = 35A) 106 IU/L                         

 

             AST (test code = 30A) 25 IU/L      <=42                      

 

             ALT (test code = 31A) 21 IU/L      <=78                      



BRAIN NATRIURETIC [id number] 18:19:00





             Test Item    Value        Reference Range Interpretation Comments

 

             proBNP (test code = PBNP) 518 pg/mL    0-125        H            



[id number] 18:15:00





             Test Item    Value        Reference Range Interpretation Comments

 

             MAGNESIUM (test code = 48A) 1.9 mg/dL    1.8-2.4                   



[id number] 17:40:00





             Test Item    Value        Reference Range Interpretation Comments

 

             D-DIMER (test code = <200 ng/mL D-DU 0-234                     



             DDI)                                                

 

             D-DIMER COMMENT (test *Level to rule out                           



             code = DDCOM) DVT or PE: <235 ng/mL                           



                          D-DU*                                  



CARDIAC [id number] 17:31:00





             Test Item    Value        Reference Range Interpretation Comments

 

             TROPONIN I (test code = A84) <0.015 ng/mL 0.000-0.045              

 



BASIC METABOLIC [id number] 17:27:00





             Test Item    Value        Reference Range Interpretation Comments

 

             GLUCOSE (test code = 06D) 114 mg/dL           H            

 

             SODIUM (test code = 01A) 142 mmol/L   136-145                   

 

             POTASSIUM (test code = 01B) 4.0 mmol/L   3.6-5.1                   

 

             CHLORIDE (test code = 04A) 111 mmol/L          H            

 

             CO2 (test code = 02A) 26 mmol/L    22-32                     

 

             ANION GAP (test code = ANG) 9.0 mmol/L                             

 

             BUN (test code = 05D) 19 mg/dL     7-18         H            

 

             CREATININE (test code = 03E) 1.2 mg/dL    0.4-1.1      H           

 

 

             BUN/CREA (test code = BCR) 15           12-20                     

 

             CALCIUM (test code = 09D) 9.2 mg/dL    8.3-9.5                   



CBC (INCLUDES AUTOMATED DIFFERENTIAL)2019 17:26:00





             Test Item    Value        Reference Range Interpretation Comments

 

             WBC (test code = WBC) 12.6 10\S\3/uL 4.5-11.0     H            

 

             RBC (test code = RBC) 4.04 10\S\6/uL 4.20-5.60    L            

 

             HGB (test code = HBG) 11.9 g/dL    12.0-15.5    L            

 

             HCT (test code = HCT) 37.6 %       35.0-44.0                 

 

             MCV (test code = MCV) 93.1 fL      81.0-99.0                 

 

             MCH (test code = MCH) 29.5 pg      27.0-31.0                 

 

             MCHC (test code = MCHC) 31.6 g/dL    32.0-36.0    L            

 

             RDW (test code = RDW) 13.8 %       11.5-14.5                 

 

             PLT (test code = PLT) 152 10\S\3/uL 130-400                   

 

             MPV (test code = MPV) 11.3 fL      9.4-12.4                  

 

             NEUTROP # (test code = NE#) 10.7 10\S\3/uL 1.6-8.0      H          

  

 

             LYMPH # (test code = LY#) 1.1 10\S\3/uL 1.1-3.5                   

 

             MONOCYTE # (test code = MO#) 0.6 10\S\3/uL 0.0-1.1                 

  

 

             EOSINOPH # (test code = EO#) 0.1 10\S\3/uL 0.0-0.7                 

  

 

             BASOPHIL # (test code = BA#) 0.0 10\S\3/uL 0.0-0.3                 

  

 

             IG # (test code = IG#) 0.06 10\S\3/uL 0.00-0.06                 

 

             NRBC # (test code = NRBC#) 0.00 10\S\3/uL 0.00-0.01                

 

 

             NEUTROPH % (test code = NE%) 85.2 %       35.0-73.0    H           

 

 

             LYMPH % (test code = LY%) 8.4 %        20.0-55.0    L            

 

             MONO % (test code = MO%) 5.0 %        2.5-10.0                  

 

             EOSINOPH % (test code = EO%) 0.6 %        0.0-5.0                  

 

 

             BASOPHIL % (test code = BA%) 0.3 %        0.0-2.0                  

 

 

             IG % (test code = IG%) 0.5 %        0.0-0.8                   

 

             NRBC% (test code = NRBC%) 0.0 %        0.0-0.2                   

 

             MANDIFF (test code = MDIFF) NO           NO                        



PTT (PARTIAL THROMBOPLASTIN TIME)2019 17:26:00





             Test Item    Value        Reference Range Interpretation Comments

 

             PTT (test code = 31.9 s       20.2-38.0                 



             PTT)                                                

 

             PTTH (test code = **** To monitor the                           



             PTTH)        effectiveness of heparin,                           



                          we offer the Anti-Xa                           



                          (Heparin Assay). It can be                           



                          used for either                           



                          unfractionated or LMW                           



                          Heparin. Order Code is                           



                          ANTI-XA ****                           



PROTHROMBIN [id number] 17:26:00





             Test Item    Value        Reference Range Interpretation Comments

 

             PT (test code = 12.0 s       9.8-13.6                  



             TT)                                                 

 

             INR (test code = 1.1                                    



             INR)                                                

 

             INRH (test code =      **** SUGGESTED                           



             INRH)        THERAPEUTIC RANGE FOR INR:                           



                          ****    2.5 - 3.5 **  For                           



                          Patients with Prosthetic                           



                          Valves or Patients with                           



                                          recurrent                           



                          Thromboembolic Events **                           



                          2.0 - 3.0 ** For Most Other                           



                          Applications **                           



XR CHEST 1 VIEW [id number] 17:04:28Portable AP chest, 1 viewLocation 
Code: [id number] HISTORY: Chest painCOMPARISON: NoneCOMMENT: Mild atelectasis is
present within the lung bases. The costophrenic angles aresharp. The 
cardiomediastinalsilhouette is unremarkable. The bones are intact.IMPRESSION: 
Mild bibasilar atelectasis. Otherwise, no acute abnormalityCHEM [id number] 
16:51:00





             Test Item    Value        Reference Range Interpretation Comments

 

             Creatinine Lvl (test code = Creatinine 1.15         0.50-1.40      

           



             Lvl)                                                



[facility] [id number] 16:51:00





             Test Item    Value        Reference Range Interpretation Comments

 

             BUN (test code = BUN) 16                                 



[facility]2017-01-24 16:51:00





             Test Item    Value        Reference Range Interpretation Comments

 

             Chloride Lvl (test code = Chloride Lvl) 109                  

            



[facility]2017-01-24 16:51:00





             Test Item    Value        Reference Range Interpretation Comments

 

             Potassium Lvl (test code = Potassium 4.3          3.5-5.1          

         



             Lvl)                                                



[facility]2017-01-24 16:51:00





             Test Item    Value        Reference Range Interpretation Comments

 

             Sodium Lvl (test code = Sodium Lvl) 144          135-145           

        



[facility]2017-01-24 16:51:00





             Test Item    Value        Reference Range Interpretation Comments

 

             CO2 (test code = CO2) 28           24-32                     



[facility]2017-01-24 16:51:00





             Test Item    Value        Reference Range Interpretation Comments

 

             Calcium Lvl (test code = Calcium Lvl) 8.4          8.5-10.5        

          



[facility]2017-01-24 16:51:00





             Test Item    Value        Reference Range Interpretation Comments

 

             AGAP (test code = AGAP) 11.3         10.0-20.0                 



[facility]2017-01-24 16:51:00





             Test Item    Value        Reference Range Interpretation Comments

 

             Glucose Lvl (test code = Glucose Lvl) 109          70-99           

          



[facility]2017-01-24 16:51:00





             Test Item    Value        Reference Range Interpretation Comments

 

             eGFR (test code = eGFR) 52                                     



[facility]

## 2022-03-13 NOTE — ER
Nurse's Notes                                                                                     

 [facility]                                                                 

Name: Juli Rushing                                                                             

Age: 66 yrs                                                                                       

Sex: Female                                                                                       

: 1955                                                                                   

MRN: [id number]                                                                                   

Arrival Date: 2022                                                                          

Time: 12:55                                                                                       

Account#: [id number]                                                                            

Bed 17                                                                                            

Private MD:                                                                                       

Diagnosis: End stage renal disease-on HD;Left sided colitis with rectal bleeding;Constipation     

                                                                                                  

Presentation:                                                                                     

                                                                                             

13:04 Chief complaint: Patient states: Has not had a bowel movement in 2 weeks since Monday.  ww  

      Patient denies abdominal pain. Had back surgery at [facility] in [facility] and had a         

      urinary stent removed on Friday and placed on Nitrofurantin. Coronavirus screen: Client     

      denies travel out of the [place] in the last 14 days. Ebola Screen: Patient denies travel      

      to an Ebola-affected area in the 21 days before illness onset. Initial Sepsis Screen:       

      Does the patient meet any 2 criteria? No. Patient's initial sepsis screen is negative.      

      Does the patient have a suspected source of infection? No. Patient's initial sepsis         

      screen is negative. Risk Assessment: Do you want to hurt yourself or someone else?          

      Patient reports no desire to harm self or others. Onset of symptoms is unknown.             

13:04 Method Of Arrival: Wheelchair                                                           ww  

13:04 Acuity: MIRTHA 3                                                                           ww  

                                                                                                  

Triage Assessment:                                                                                

13:06 General: Appears in no apparent distress. Behavior is calm, cooperative. Pain: Denies   ww  

      pain. EENT: No signs and/or symptoms were reported regarding the EENT system. Neuro:        

      Level of Consciousness is awake, alert, obeys commands, Oriented to person, place,          

      time, situation, Speech is normal. Cardiovascular: Patient's skin is warm and dry.          

      Respiratory: Airway is patent Respiratory effort is even, unlabored, Respiratory            

      pattern is regular, symmetrical, wears 2L NC at home. GI: Reports constipation. :         

      Reports makes very little urine.                                                            

                                                                                                  

Historical:                                                                                       

- Allergies:                                                                                      

13:06 cefepime;                                                                               ww  

13:06 Codeine;                                                                                ww  

13:06 PENICILLINS;                                                                            ww  

13:06 QUINOLONES;                                                                             ww  

- PMHx:                                                                                           

13:06 Atrial fibrillation; Congestive heart failure; stage 4 kidney failure; Hypertensive     ww  

      disorder; High Cholesterol; Dialysis;                                                       

- PSHx:                                                                                           

13:06 back surgery;                                                                           ww  

                                                                                                  

- Immunization history:: Adult Immunizations Flu vaccine is up to date.                           

- Social history:: Smoking status: Patient denies any tobacco usage or history of.                

- Family history:: not pertinent.                                                                 

                                                                                                  

                                                                                                  

Screenin:08 Abuse screen: Denies threats or abuse. Denies injuries from another. Nutritional        ww  

      screening: No deficits noted. Tuberculosis screening: No symptoms or risk factors           

      identified.                                                                                 

15:00 Fall Risk IV access (20 points). Ambulatory Aid- Crutches/Cane/Walker (15 pts). Gait-   jd3 

      Normal/Bed Rest/Wheelchair (0 pts) Mental Status- Oriented to own ability (0 pts).          

      Total Gates Fall Scale indicates Low Risk Score (25-44 pts). Fall prevention measures       

      have been instituted. Side Rails Up X 2 Placed close to Nursing Station Frequent            

      Obs/Assesments occuring Family Present and informed to notify staff if they need to         

      leave bedside.                                                                              

                                                                                                  

Assessment:                                                                                       

13:00 General: Appears in no apparent distress. Behavior is calm, cooperative, appropriate    ag7 

      for age. Pain: Complains of pain in annus when attemting to defecate Pain does not          

      radiate. Pain currently is 7 out of 10 on a pain scale. Quality of pain is described as     

      aching, Pain began suddenly, Is intermittent, Alleviated by rest. Neuro: Level of           

      Consciousness is awake, alert, obeys commands, Oriented to person, place, situation,        

      Appropriate for age  are equal bilaterally. Cardiovascular: Heart tones S1 S2          

      present Capillary refill < 3 seconds Pulses are 1+ in right radial artery and left          

      radial artery Edema is absent. Rhythm is regular. Respiratory: Airway is patent Trachea     

      midline Respiratory effort is even, unlabored, Respiratory pattern is regular,              

      symmetrical, Breath sounds are diminished bilaterally. GI: Abdomen is round distended,      

      obese, Bowel sounds present in ascending, transverse colon bowel sounds are normoactive     

      absent in descending, sigmoid Bruits are absent. Abd is soft and non tender X 4 quads.      

      Reports constipation.                                                                       

14:25 GI: Stools are reported to be large amount of stool noted. small amount of blood noted  jd3 

      on stool. pt reports only small amount of abdominal pressure relief. . Last BM at 14:25.    

14:51 Reassessment: Patient appears in no apparent distress at this time. No changes from     jd3 

      previously documented assessment. Patient and/or family updated on plan of care and         

      expected duration. Pain level reassessed. Patient is alert, oriented x 3, equal             

      unlabored respirations, skin warm/dry/pink.                                                 

15:52 Reassessment: No changes from previously documented assessment. Patient and/or family   ag7 

      updated on plan of care and expected duration. Pain level reassessed. Patient is alert,     

      oriented x 3, equal unlabored respirations, skin warm/dry/pink. Patient denies pain at      

      this time. Patient states feeling better. Patient states symptoms have improved. extra      

      extra large bowel movement x1.                                                              

16:52 Reassessment: No changes from previously documented assessment. Patient and/or family   ag7 

      updated on plan of care and expected duration. Pain level reassessed. Patient is alert,     

      oriented x 3, equal unlabored respirations, skin warm/dry/pink.                             

17:52 Reassessment: No changes from previously documented assessment. Patient is alert,       ag7 

      oriented x 3, equal unlabored respirations, skin warm/dry/pink.                             

                                                                                                  

Vital Signs:                                                                                      

13:04 BP 91 / 71; Pulse 82; Resp 18; Temp 98.1; Pulse Ox 100% on 2 lpm NC; Weight 104.33 kg;  ww  

      Height 5 ft. 7 in. (170.18 cm); Pain 0/10;                                                  

13:09 BP 95 / 48; Pulse 80; Resp 18; Temp 98.1; Pulse Ox 100% on 2 lpm NC;                    dh4 

15:00  / 63; Pulse 83; Resp 17 S; Pulse Ox 100% on 2 lpm NC;                            jd3 

16:02 BP 93 / 59; Pulse 71 MON; Resp 16 S; Pulse Ox 100% on R/A; Pain 0/10;                   ag7 

17:07  / 103 RA Sitting (man/lg); Pulse 77 MON;                                         ag7 

17:10 BP 96 / 41 RA Supine (man/lg); Pulse 41 MON;                                            ag7 

17:13 BP 43 / 27 RA Standing (man/lg); Pulse 82 MON;                                          ag7 

17:13  / 51 RA Sitting (man/lg); Pulse 86 MON;                                          ag7 

17:50 BP 96 / 62 RA Sitting (man/lg); Pulse 97 MON;                                           ag7 

17:55  / 95 RA Standing (man/lg); Pulse 79 MON;                                         ag7 

13:04 Body Mass Index 36.02 (104.33 kg, 170.18 cm)                                            ww  

                                                                                                  

ED Course:                                                                                        

12:55 Patient arrived in ED.                                                                  ds1 

13:06 Triage completed.                                                                       ww  

13:06 Arm band placed on right wrist.                                                         ww  

13:10 Laura Oneill, RN is Primary Nurse.                                                     ag7 

13:13 Arnold Larsen MD is Attending Physician.                                             cee 

13:41 Inserted saline lock: 20 gauge in right antecubital area, using aseptic technique.      dh4 

      Blood collected.                                                                            

14:15 Notified ED physician of a critical lab result(s). Creatinine 7.8.                      ab2 

14:24 Inserted saline lock: 22 gauge in left antecubital area, using aseptic technique.       jd3 

15:01 Patient has correct armband on for positive identification. Bed in low position. Call   jd3 

      light in reach. Side rails up X2. Adult w/ patient. Pulse ox on. NIBP on.                   

15:39 Abdomen In Process Unspecified.                                                         EDMS

15:53 XRAY Chest (1 view) In Process Unspecified.                                             EDMS

16:46 Clark Cuellar MD is Referral Physician.                                              cee 

16:48 Fabi Vergara MD is Referral Physician.                                              cee 

18:28 No provider procedures requiring assistance completed. IV discontinued, intact,         ag7 

      bleeding controlled, No redness/swelling at site. Pressure dressing applied.                

                                                                                                  

Administered Medications:                                                                         

18:33 Discontinued: NS 0.9% 1000 ml IV at 125 ml/hr continuous                                ag7 

13:46 Drug: NS 0.9% 1000 ml Route: IV; Rate: 125 ml/hr; Site: right antecubital;              ag7 

13:46 Drug: Dulcolax (bisacodyl) Suppository 10 mg Route: NC;                                 ag7 

14:16 Follow up: Response: No adverse reaction                                                ag7 

13:46 Drug: Lactulose 60 grams Volume: 45 ml; Route: PO;                                      ag7 

14:16 Follow up: Response: No adverse reaction                                                ag7 

17:17 CANCELLED (Duplicate Order): Bactrim (trimethoprim-sulfamethoxazole) (160 mg-800 mg     cee 

      (DS) 1 tablet PO once                                                                       

17:24 Drug: ProTONIX (pantoprazole) 40 mg Route: IVP; Site: left antecubital;                 ag7 

18:30 Follow up: Response: No adverse reaction                                                ag7 

17:25 Drug: Flagyl (metroNIDAZOLE) 500 mg Volume: 100 ml; Route: IVPB; Rate: 200 ml/hr;       ag7 

      Infused Over: 30 mins; Site: left antecubital;                                              

18:30 Follow up: Response: Adverse reaction, Physician notified                               ag7 

17:31 Drug: Doxycycline 100 mg Route: PO;                                                     ag7 

18:30 Follow up: Response: No adverse reaction; No change in condition                        ag7 

                                                                                                  

                                                                                                  

Output:                                                                                           

17:49 Stool: 5 (Loose Stool) ; Total: 0ml.                                                    ag7 

                                                                                                  

Outcome:                                                                                          

16:49 Discharge ordered by MD.                                                                cee 

18:28 Discharged to home via wheelchair, with family.                                         ag7 

18:28 Condition: stable                                                                           

18:28 Discharge instructions given to patient, family, Instructed on discharge instructions,      

      follow up and referral plans. medication usage, Demonstrated understanding of               

      instructions, follow-up care, medications, Prescriptions given X 5                          

18:32 Patient left the ED.                                                                    ag7 

                                                                                                  

Signatures:                                                                                       

Dispatcher [employer]                           EDMS                                                 

Arnold Larsen MD MD cha Sanford, Demi                                ds1                                                  

Hunter Mercado RN                    RN   Roberto Macias                                 4                                                  

Netta Bauman RN RN ww Bleininger, Alexis ab2 Glenn, Angela, RN                       RN   ag7                                                  

                                                                                                  

**************************************************************************************************

## 2022-03-13 NOTE — RAD REPORT
EXAM DESCRIPTION:  CTAbdomen   Pelvis Wo Contrast - 3/13/2022 3:40 pm

 

CLINICAL HISTORY:

Abd pain;Constipation

 

COMPARISON:  [facility] Protocol dated 1/5/2022[facility] Protocol dated 1/5/2022; Chest Single View dated 3/13
/2022

 

TECHNIQUE:  CT of the abdomen and pelvis was performed.

 

All CT scans are performed using dose optimization technique as appropriate and may include automated
 exposure control or mA/KV adjustment according to patient size.

 

FINDINGS:  Lower chest: Cardiomegaly. Dialysis catheter. Dependent atelectasis. Trace pleural fluid o
n the right.

Liver: No acute abnormality or suspicious lesions.

Biliary: Biliary stent. Pneumobilia. Cholecystectomy.

Stomach: No significant focal abnormality.

Duodenum: No significant focal abnormality.

Pancreas: No significant abnormality.

Spleen: No significant abnormality.

Adrenal: No suspicious lesions.

Kidney/ureter: No hydronephrosis. Calcified contents within the collecting systems. No hydronephrosis
. Bilateral renal scarring. Too small to characterize and/or benign appearing renal lesions are noted
.

Retroperitoneum: No retroperitoneal adenopathy.

Vascular: No aneurysm.

Bowel: Moderate stool in the colon .. Mild perirectal edema and rectal wall thickening.

Peritoneum: Fluid collection along the inferior margin of the liver measuring 11.3 x 4.3 cm. The flui
d is simple in appearance. It was also present on the CT from 01/05/2022.

Bladder: Gas within the bladder.

Reproductive: No adnexal masses. Tubal ligation clips.

Bones: No acute fracture. L3 compression deformity is unchanged .

Other: n/a

 

IMPRESSION:  Mild rectal wall thickening and perirectal stranding could represent a proctitis.

 

Fluid collection along the inferior margin of the right hepatic lobe is unchanged. This has a simple 
appearance.

 

Gas within the bladder is presumably related to instrumentation. Correlate with urinalysis.

## 2022-03-13 NOTE — EDPHYS
Physician Documentation                                                                           

 [facility]                                                                 

Name: Juli Rushing                                                                             

Age: 66 yrs                                                                                       

Sex: Female                                                                                       

: 1955                                                                                   

MRN: [id number]                                                                                   

Arrival Date: 2022                                                                          

Time: 12:55                                                                                       

Account#: [id number]                                                                            

Bed 17                                                                                            

Private MD:                                                                                       

INDIO Physician Arnold Larsen                                                                      

HPI:                                                                                              

                                                                                             

16:42 This 66 yrs old  Female presents to ER via Wheelchair with complaints of       cee 

      Constipation.                                                                               

16:42 The patient presents with abdominal pain in the lower abdomen, abdominal distention in  cee 

      the upper abdomen, in the lower abdomen. Onset: The symptoms/episode began/occurred 3       

      day(s) ago. The patient presents to the emergency department with rectal bleeding, a        

      small amount, bright red blood with bowel movement. Onset: The symptoms/episode             

      began/occurred today. Abdominal pain: located in the right lower quadrant and left          

      lower quadrant. Modifying factors: The symptoms are alleviated by nothing. Associated       

      signs and symptoms: The patient has no apparent associated signs or symptoms. The           

      symptoms do not radiate. Associated signs and symptoms: Pertinent positives:                

      constipation. Modifying factors: The symptoms are alleviated by nothing, the symptoms       

      are aggravated by nothing.                                                                  

                                                                                                  

Historical:                                                                                       

- Allergies:                                                                                      

13:06 cefepime;                                                                               ww  

13:06 Codeine;                                                                                ww  

13:06 PENICILLINS;                                                                            ww  

13:06 QUINOLONES;                                                                             ww  

- PMHx:                                                                                           

13:06 Atrial fibrillation; Congestive heart failure; stage 4 kidney failure; Hypertensive     ww  

      disorder; High Cholesterol; Dialysis;                                                       

- PSHx:                                                                                           

13:06 back surgery;                                                                           ww  

                                                                                                  

- Immunization history:: Adult Immunizations Flu vaccine is up to date.                           

- Social history:: Smoking status: Patient denies any tobacco usage or history of.                

- Family history:: not pertinent.                                                                 

                                                                                                  

                                                                                                  

ROS:                                                                                              

16:42 Constitutional: Negative for fever, chills, and weight loss, Eyes: Negative for injury, cee 

      pain, redness, and discharge, ENT: Negative for injury, pain, and discharge, Neck:          

      Negative for injury, pain, and swelling, Cardiovascular: Negative for chest pain,           

      palpitations, and edema, Respiratory: Negative for shortness of breath, cough,              

      wheezing, and pleuritic chest pain, Back: Negative for injury and pain, : Negative        

      for injury, bleeding, discharge, and swelling, MS/Extremity: Negative for injury and        

      deformity, Skin: Negative for injury, rash, and discoloration, Neuro: Negative for          

      headache, weakness, numbness, tingling, and seizure, Psych: Negative for depression,        

      anxiety, suicide ideation, homicidal ideation, and hallucinations, Allergy/Immunology:      

      Negative for hives, rash, and allergies, Endocrine: Negative for neck swelling,             

      polydipsia, polyuria, polyphagia, and marked weight changes, Hematologic/Lymphatic:         

      Negative for swollen nodes, abnormal bleeding, and unusual bruising.                        

16:42 Abdomen/GI: Positive for abdominal pain, of the right lower quadrant and left lower         

      quadrant.                                                                                   

                                                                                                  

Exam:                                                                                             

16:42 Constitutional:  This is a well developed, well nourished patient who is awake, alert,  cee 

      and in no acute distress. Head/Face:  Normocephalic, atraumatic. Eyes:  Pupils equal        

      round and reactive to light, extra-ocular motions intact.  Lids and lashes normal.          

      Conjunctiva and sclera are non-icteric and not injected.  Cornea within normal limits.      

      Periorbital areas with no swelling, redness, or edema. ENT:  Nares patent. No nasal         

      discharge, no septal abnormalities noted.  Tympanic membranes are normal and external       

      auditory canals are clear.  Oropharynx with no redness, swelling, or masses, exudates,      

      or evidence of obstruction, uvula midline.  Mucous membranes moist. Neck:  Trachea          

      midline, no thyromegaly or masses palpated, and no cervical lymphadenopathy.  Supple,       

      full range of motion without nuchal rigidity, or vertebral point tenderness.  No            

      Meningismus. Chest/axilla:  Normal chest wall appearance and motion.  Nontender with no     

      deformity.  No lesions are appreciated. Cardiovascular:  Regular rate and rhythm with a     

      normal S1 and S2.  No gallops, murmurs, or rubs.  Normal PMI, no JVD.  No pulse             

      deficits. Respiratory:  Lungs have equal breath sounds bilaterally, clear to                

      auscultation and percussion.  No rales, rhonchi or wheezes noted.  No increased work of     

      breathing, no retractions or nasal flaring. Back:  No spinal tenderness.  No                

      costovertebral tenderness.  Full range of motion. Female :  Normal external               

      genitalia. Skin:  Warm, dry with normal turgor.  Normal color with no rashes, no            

      lesions, and no evidence of cellulitis. MS/ Extremity:  Pulses equal, no cyanosis.          

      Neurovascular intact.  Full, normal range of motion. Neuro:  Awake and alert, GCS 15,       

      oriented to person, place, time, and situation.  Cranial nerves II-XII grossly intact.      

      Motor strength 5/5 in all extremities.  Sensory grossly intact.  Cerebellar exam            

      normal.  Normal gait. Psych:  Awake, alert, with orientation to person, place and time.     

       Behavior, mood, and affect are within normal limits.                                       

16:42 Abdomen/GI: Inspection: abdomen appears normal, Bowel sounds: normal, Palpation: mild       

      abdominal tenderness, in the right lower quadrant and left lower quadrant, Rectal exam:     

      Stool: guaiac positive, no melena, hemorrhoid(s), are not appreciated, mass, is not         

      appreciated, swelling, is not appreciated, tenderness, is not appreciated, Liver: no        

      appreciated palpable abnormalities, Hernia: not appreciated.                                

                                                                                                  

Vital Signs:                                                                                      

13:04 BP 91 / 71; Pulse 82; Resp 18; Temp 98.1; Pulse Ox 100% on 2 lpm NC; Weight 104.33 kg;  ww  

      Height 5 ft. 7 in. (170.18 cm); Pain 0/10;                                                  

13:09 BP 95 / 48; Pulse 80; Resp 18; Temp 98.1; Pulse Ox 100% on 2 lpm NC;                    dh4 

15:00  / 63; Pulse 83; Resp 17 S; Pulse Ox 100% on 2 lpm NC;                            jd3 

16:02 BP 93 / 59; Pulse 71 MON; Resp 16 S; Pulse Ox 100% on R/A; Pain 0/10;                   ag7 

17:07  / 103 RA Sitting (man/lg); Pulse 77 MON;                                         ag7 

17:10 BP 96 / 41 RA Supine (man/lg); Pulse 41 MON;                                            ag7 

17:13 BP 43 / 27 RA Standing (man/lg); Pulse 82 MON;                                          ag7 

17:13  / 51 RA Sitting (man/lg); Pulse 86 MON;                                          ag7 

17:50 BP 96 / 62 RA Sitting (man/lg); Pulse 97 MON;                                           ag7 

17:55  / 95 RA Standing (man/lg); Pulse 79 MON;                                         ag7 

13:04 Body Mass Index 36.02 (104.33 kg, 170.18 cm)                                            ww  

                                                                                                  

MDM:                                                                                              

13:13 Patient medically screened.                                                             cee 

16:52 Differential diagnosis: gastritis, diverticulitis, hemorrhoids, varices,                cee 

      diverticulitis, gastritis, gastroesophageal reflux disease, GI Bleed, non-specific abd      

      pain, pancreatitis, Peptic Ulcer Disease, Ureterolithiasis, urinary tract infection.        

      Data reviewed: vital signs, nurses notes, lab test result(s), EKG, radiologic studies,      

      CT scan, plain films. Data interpreted: Cardiac monitor: rate is 71 beats/min, rhythm       

      is regular, Pulse oximetry: on room air is 100 %. Test interpretation: by ED physician      

      or midlevel provider: ECG, plain radiologic studies. Counseling: I had a detailed           

      discussion with the patient and/or guardian regarding: the historical points, exam          

      findings, and any diagnostic results supporting the discharge/admit diagnosis, lab          

      results, radiology results, the need for outpatient follow up, for definitive care, a       

      gastroenterologist, an internist.                                                           

                                                                                                  

                                                                                             

13:16 Order name: Basic Metabolic Panel                                                       [facility] 

                                                                                             

13:16 Order name: CBC with Diff; Complete Time: 15:21                                         [facility] 

                                                                                             

13:16 Order name: LFT's; Complete Time: 15:21                                                 cee 

                                                                                             

13:16 Order name: Magnesium; Complete Time: 15:21                                             cee 

                                                                                             

13:16 Order name: NT PRO-BNP; Complete Time: 15:21                                                                                                                                         

13:16 Order name: PT-INR; Complete Time: 15:21                                                cee 

                                                                                             

13:16 Order name: Troponin HS; Complete Time: 15:21                                           [facility] 

                                                                                             

13:16 Order name: XRAY Chest (1 view); Complete Time: 16:16                                   cee 

                                                                                             

13:16 Order name: Lipase; Complete Time: 15:21                                                cee 

                                                                                             

13:17 Order name: Basic Metabolic Panel; Complete Time: 15:21                                 [facility]

                                                                                             

13:39 Order name: Abdomen ; Complete Time: 16:16                                              [facility]

                                                                                             

13:16 Order name: EKG; Complete Time: 13:17                                                                                                                                                

13:16 Order name: Cardiac monitoring; Complete Time: 14:02                                                                                                                                 

13:16 Order name: EKG - Nurse/Tech; Complete Time: 14:02                                      cee 

                                                                                             

13:16 Order name: Labs collected and sent; Complete Time: 14:02                                                                                                                            

13:16 Order name: O2 Per Protocol; Complete Time: 14:02                                                                                                                                    

13:16 Order name: O2 Sat Monitoring; Complete Time: 14:02                                                                                                                                  

16:42 Order name: Orthostatics; Complete Time: 18:22                                          cee 

                                                                                                  

Administered Medications:                                                                         

18:33 Discontinued: NS 0.9% 1000 ml IV at 125 ml/hr continuous                                ag7 

13:46 Drug: NS 0.9% 1000 ml Route: IV; Rate: 125 ml/hr; Site: right antecubital;              ag7 

13:46 Drug: Dulcolax (bisacodyl) Suppository 10 mg Route: IL;                                 ag7 

14:16 Follow up: Response: No adverse reaction                                                ag7 

13:46 Drug: Lactulose 60 grams Volume: 45 ml; Route: PO;                                      ag7 

14:16 Follow up: Response: No adverse reaction                                                ag7 

17:17 CANCELLED (Duplicate Order): Bactrim (trimethoprim-sulfamethoxazole) (160 mg-800 mg     cee 

      (DS) 1 tablet PO once                                                                       

17:24 Drug: ProTONIX (pantoprazole) 40 mg Route: IVP; Site: left antecubital;                 ag7 

18:30 Follow up: Response: No adverse reaction                                                ag7 

17:25 Drug: Flagyl (metroNIDAZOLE) 500 mg Volume: 100 ml; Route: IVPB; Rate: 200 ml/hr;       ag7 

      Infused Over: 30 mins; Site: left antecubital;                                              

18:30 Follow up: Response: Adverse reaction, Physician notified                               ag7 

17:31 Drug: Doxycycline 100 mg Route: PO;                                                     ag7 

18:30 Follow up: Response: No adverse reaction; No change in condition                        ag7 

                                                                                                  

                                                                                                  

Disposition Summary:                                                                              

22 16:49                                                                                    

Discharge Ordered                                                                                 

      Location: Home                                                                          cee 

      Problem: new                                                                            cee 

      Symptoms: have improved                                                                 cee 

      Condition: Stable                                                                       cee 

      Diagnosis                                                                                   

        - End stage renal disease - on HD                                                     cee 

        - Left sided colitis with rectal bleeding                                             cee 

        - Constipation                                                                        cee 

      Followup:                                                                               cee 

        - With: Private Physician                                                                  

        - When: 2 - 3 days                                                                         

        - Reason: Recheck today's complaints, Continuance of care, Re-evaluation by your           

      physician                                                                                   

      Followup:                                                                               cee 

        - With: Clark Cuellar MD                                                                

        - When: 1 - 2 days                                                                         

        - Reason: Recheck today's complaints, Re-evaluation by your physician                      

      Followup:                                                                               cee 

        - With: Fabi Vergara MD                                                                

        - When: 1 - 2 days                                                                         

        - Reason: Recheck today's complaints, Re-evaluation by your physician                      

      Discharge Instructions:                                                                     

        - Discharge Summary Sheet                                                             cee 

        - Dialysis                                                                            cee 

        - Hemodialysis, Care After                                                            cee 

        - Colitis                                                                             cee 

        - Eating Plan for Dialysis                                                            cee 

        - Hemodialysis, Easy-to-Read                                                          cee 

      Forms:                                                                                      

        - Medication Reconciliation Form                                                      cee 

        - Thank You Letter                                                                    cee 

        - Antibiotic Education                                                                cee 

        - Prescription Opioid Use                                                             [facility] 

      Prescriptions:                                                                              

        - Flagyl 500 mg Oral Tablet                                                                

            - take 1 tablet by ORAL route every 8 hours for 7 days; 3 tablet; Refills: 0,     [facility] 

      Product Selection Permitted                                                                 

        - Protonix 40 mg Oral Tablet                                                               

            - take 1 tablet by ORAL route once daily; 30 tablet; Refills: 0, Product          cee 

      Selection Permitted                                                                         

        - dicyclomine 20 mg Oral Tablet                                                            

            - take 1 tablet by ORAL route 4 times per day; 28 tablet; Refills: 0, Product     [facility] 

      Selection Permitted                                                                         

        - Lactulose 10 gram/15 mL Oral Solution                                                    

            - take 30 milliliters by ORAL route once daily; 300 milliliter; Refills: 0,       [facility] 

      Product Selection Permitted                                                                 

        - Doxycycline Hyclate 100 mg Oral Tablet                                                   

            - take 1 tablet by ORAL route every 12 hours; 20 tablet; Refills: 0, Product      [facility] 

      Selection Permitted                                                                         

Signatures:                                                                                       

Dispatcher [employer]                           EDMS                                                 

Arnold Larsen MD MD cha Wood, Whitney RN                       RN   ww                                                   

Laura Oneill RN                       RN   ag7                                                  

                                                                                                  

Corrections: (The following items were deleted from the chart)                                    

13:39 13:17 Abdomen Pelvis W [id number] ordered. EDMS                                     EDMS

17:17 16:18 Bactrim (trimethoprim-sulfamethoxazole) (160 mg-800 mg (DS) 1 tablet PO once      [facility] 

      ordered. [facility]                                                                                

                                                                                                  

**************************************************************************************************

## 2022-03-13 NOTE — RAD REPORT
EXAM DESCRIPTION:  RAD - Chest Single View - 3/13/2022 3:53 pm

 

CLINICAL HISTORY:  COUGH

 

COMPARISON:  Chest Single View dated 1/12/2022; Chest Single View dated 1/5/2022

 

FINDINGS:  Lines: Dialysis catheter.

Lungs: Linear opacities at the right lung base.

Pleural: No significant pleural effusions or pneumothorax.

Cardiac: Mild cardiomegaly.

Bones: No acute fractures.

Other:

 

IMPRESSION:  Linear opacities at the right lung base likely reflecting atelectasis.

## 2022-07-29 ENCOUNTER — HOSPITAL ENCOUNTER (INPATIENT)
Dept: HOSPITAL 97 - ER | Age: 67
LOS: 3 days | Discharge: HOME | DRG: 291 | End: 2022-08-01
Attending: INTERNAL MEDICINE | Admitting: INTERNAL MEDICINE
Payer: COMMERCIAL

## 2022-07-29 VITALS — BODY MASS INDEX: 35.8 KG/M2

## 2022-07-29 DIAGNOSIS — Z99.2: ICD-10-CM

## 2022-07-29 DIAGNOSIS — E87.5: ICD-10-CM

## 2022-07-29 DIAGNOSIS — Z20.822: ICD-10-CM

## 2022-07-29 DIAGNOSIS — N18.6: ICD-10-CM

## 2022-07-29 DIAGNOSIS — N25.81: ICD-10-CM

## 2022-07-29 DIAGNOSIS — Z91.15: ICD-10-CM

## 2022-07-29 DIAGNOSIS — E78.5: ICD-10-CM

## 2022-07-29 DIAGNOSIS — E87.2: ICD-10-CM

## 2022-07-29 DIAGNOSIS — Z86.16: ICD-10-CM

## 2022-07-29 DIAGNOSIS — I34.0: ICD-10-CM

## 2022-07-29 DIAGNOSIS — Z88.0: ICD-10-CM

## 2022-07-29 DIAGNOSIS — M54.9: ICD-10-CM

## 2022-07-29 DIAGNOSIS — Z79.01: ICD-10-CM

## 2022-07-29 DIAGNOSIS — I50.33: ICD-10-CM

## 2022-07-29 DIAGNOSIS — D63.1: ICD-10-CM

## 2022-07-29 DIAGNOSIS — I13.2: Primary | ICD-10-CM

## 2022-07-29 LAB
BUN BLD-MCNC: 84 MG/DL (ref 7–18)
GLUCOSE SERPLBLD-MCNC: 97 MG/DL (ref 74–106)
HCT VFR BLD CALC: 28 % (ref 36–45)
INR BLD: 1.48
LIPASE SERPL-CCNC: 86 U/L (ref 73–393)
LYMPHOCYTES # SPEC AUTO: 1.9 K/UL (ref 0.7–4.9)
MCV RBC: 101.5 FL (ref 80–100)
PMV BLD: 8.7 FL (ref 7.6–11.3)
POTASSIUM SERPL-SCNC: 7.3 MMOL/L (ref 3.5–5.1)
RBC # BLD: 2.76 M/UL (ref 3.86–4.86)
TROPONIN I SERPL HS-MCNC: 29.4 PG/ML (ref ?–58.9)

## 2022-07-29 PROCEDURE — 87340 HEPATITIS B SURFACE AG IA: CPT

## 2022-07-29 PROCEDURE — 80048 BASIC METABOLIC PNL TOTAL CA: CPT

## 2022-07-29 PROCEDURE — 99284 EMERGENCY DEPT VISIT MOD MDM: CPT

## 2022-07-29 PROCEDURE — 85025 COMPLETE CBC W/AUTO DIFF WBC: CPT

## 2022-07-29 PROCEDURE — 97530 THERAPEUTIC ACTIVITIES: CPT

## 2022-07-29 PROCEDURE — 74176 CT ABD & PELVIS W/O CONTRAST: CPT

## 2022-07-29 PROCEDURE — 87811 SARS-COV-2 COVID19 W/OPTIC: CPT

## 2022-07-29 PROCEDURE — 84443 ASSAY THYROID STIM HORMONE: CPT

## 2022-07-29 PROCEDURE — 94640 AIRWAY INHALATION TREATMENT: CPT

## 2022-07-29 PROCEDURE — 93005 ELECTROCARDIOGRAM TRACING: CPT

## 2022-07-29 PROCEDURE — 36415 COLL VENOUS BLD VENIPUNCTURE: CPT

## 2022-07-29 PROCEDURE — 86706 HEP B SURFACE ANTIBODY: CPT

## 2022-07-29 PROCEDURE — 97161 PT EVAL LOW COMPLEX 20 MIN: CPT

## 2022-07-29 PROCEDURE — 97116 GAIT TRAINING THERAPY: CPT

## 2022-07-29 PROCEDURE — 71045 X-RAY EXAM CHEST 1 VIEW: CPT

## 2022-07-29 PROCEDURE — 85610 PROTHROMBIN TIME: CPT

## 2022-07-29 PROCEDURE — 93306 TTE W/DOPPLER COMPLETE: CPT

## 2022-07-29 PROCEDURE — 96374 THER/PROPH/DIAG INJ IV PUSH: CPT

## 2022-07-29 PROCEDURE — 90935 HEMODIALYSIS ONE EVALUATION: CPT

## 2022-07-29 PROCEDURE — 84484 ASSAY OF TROPONIN QUANT: CPT

## 2022-07-29 PROCEDURE — 5A1D70Z PERFORMANCE OF URINARY FILTRATION, INTERMITTENT, LESS THAN 6 HOURS PER DAY: ICD-10-PCS

## 2022-07-29 PROCEDURE — 80069 RENAL FUNCTION PANEL: CPT

## 2022-07-29 PROCEDURE — 96375 TX/PRO/DX INJ NEW DRUG ADDON: CPT

## 2022-07-29 PROCEDURE — 83690 ASSAY OF LIPASE: CPT

## 2022-07-29 PROCEDURE — 82947 ASSAY GLUCOSE BLOOD QUANT: CPT

## 2022-07-29 RX ADMIN — HEPARIN SODIUM SCH UNIT: 5000 INJECTION, SOLUTION INTRAVENOUS; SUBCUTANEOUS at 20:49

## 2022-07-29 RX ADMIN — Medication SCH ML: at 20:50

## 2022-07-29 NOTE — ER
Nurse's Notes                                                                                     

 [facility]                                                                 

Name: Juli Rushing                                                                             

Age: 66 yrs                                                                                       

Sex: Female                                                                                       

: 1955                                                                                   

MRN: [id number]                                                                                   

Arrival Date: 2022                                                                          

Time: 12:22                                                                                       

Account#: [id number]                                                                            

Bed 4                                                                                             

Private MD: Anthony Elkins                                                                         

Diagnosis: Weakness;Dyspnea;Unspecified diastolic (congestive) heart failure-Volume Overload;End  

  stage renal disease-on HD;Diarrhea, unspecified;Hyperkalemia-7.3                                

                                                                                                  

Presentation:                                                                                     

                                                                                             

13:15 Chief complaint: Patient states: has bene having diarrhea and nausea for days and has   iw  

      severe back pain and Dr. Elkins wanted some blood work done because she hasn't been to       

      dialysis in 6 days. Coronavirus screen: Client presents with at least one sign or           

      symptom that may indicate coronavirus-19. Ebola Screen: Patient negative for fever          

      greater than or equal to 101.5 degrees Fahrenheit, and additional compatible Ebola          

      Virus Disease symptoms Patient denies exposure to infectious person. Patient denies         

      travel to an Ebola-affected area in the 21 days before illness onset. No symptoms or        

      risks identified at this time. Initial Sepsis Screen: Does the patient meet any 2           

      criteria? No. Patient's initial sepsis screen is negative. Does the patient have a          

      suspected source of infection? No. Patient's initial sepsis screen is negative. Risk        

      Assessment: Do you want to hurt yourself or someone else? Patient reports no desire to      

      harm self or others. Onset of symptoms was 2022.                                   

13:15 Method Of Arrival: Wheelchair                                                           iw  

13:15 Acuity: MIRTHA 3                                                                           iw  

15:27 Acuity: MIRTHA 2                                                                           iw  

                                                                                                  

Historical:                                                                                       

- Allergies:                                                                                      

13:17 cefepime;                                                                               iw  

13:17 Codeine;                                                                                iw  

13:17 PENICILLINS;                                                                            iw  

13:17 QUINOLONES;                                                                             iw  

13:17 Bactrim;                                                                                iw  

- PMHx:                                                                                           

13:17 Atrial fibrillation; Congestive heart failure; Dialysis; High Cholesterol; Hypertensive iw  

      disorder; stage 4 kidney failure;                                                           

- PSHx:                                                                                           

13:17 back surgery;                                                                           iw  

                                                                                                  

- Immunization history:: Client reports receiving the 2nd dose of the Covid vaccine.              

- Social history:: Smoking status: Patient denies any tobacco usage or history of.                

                                                                                                  

                                                                                                  

Screenin:32 Abuse screen: Denies threats or abuse. Denies injuries from another. Nutritional        hb  

      screening: No deficits noted. Tuberculosis screening: No symptoms or risk factors           

      identified. Fall Risk None identified.                                                      

                                                                                                  

Assessment:                                                                                       

14:32 General: Appears in no apparent distress. Behavior is calm, cooperative. Pain: Pain     hb  

      currently is 3 out of 10 on a pain scale. Neuro: Level of Consciousness is awake,           

      alert, obeys commands, Oriented to person, place, time, situation. Cardiovascular:          

      Patient's skin is warm and dry. Respiratory: Respiratory effort is even, unlabored,         

      Respiratory pattern is regular, symmetrical. GI: Reports diarrhea, nausea. : No signs     

      and/or symptoms were reported regarding the genitourinary system. EENT: No signs and/or     

      symptoms were reported regarding the EENT system. Derm: Skin is pink, warm \T\ dry.         

      Musculoskeletal: No signs and/or symptoms reported regarding the musculoskeletal system.    

16:10 Reassessment: Patient appears in no apparent distress at this time. Patient and/or      hb  

      family updated on plan of care and expected duration. Pain level reassessed. Patient is     

      alert, oriented x 3, equal unlabored respirations, skin warm/dry/pink.                      

17:30 Reassessment: Patient appears in no apparent distress at this time. Patient and/or      hb  

      family updated on plan of care and expected duration. Pain level reassessed. Patient is     

      alert, oriented x 3, equal unlabored respirations, skin warm/dry/pink.                      

19:02 Reassessment: Patient appears in no apparent distress at this time. Patient and/or      hb  

      family updated on plan of care and expected duration. Pain level reassessed. Patient is     

      alert, oriented x 3, equal unlabored respirations, skin warm/dry/pink.                      

                                                                                                  

Vital Signs:                                                                                      

13:15  / 53; Pulse 64; Resp 16; Temp 98.2; Pulse Ox 99% on R/A; Weight 104.33 kg;       iw  

      Height 5 ft. 7 in. (170.18 cm);                                                             

14:50  / 79; Pulse 57; Resp 17; Pulse Ox 98% on 3 lpm NC;                               hb  

16:10  / 83; Pulse 71; Resp 21; Pulse Ox 98% on 3 lpm NC;                               hb  

17:30  / 70; Pulse 74; Resp 19; Pulse Ox 98% on 3 lpm NC;                               hb  

19:02  / 65; Pulse 74; Resp 22; Pulse Ox 98% on 3 lpm NC;                               hb  

13:15 Body Mass Index 36.02 (104.33 kg, 170.18 cm)                                            iw  

                                                                                                  

ED Course:                                                                                        

12:22 Patient arrived in ED.                                                                  mr  

12:23 Anthony Eklins MD is Private Physician.                                                 mr  

13:17 Triage completed.                                                                       iw  

13:17 Arm band placed on.                                                                     iw  

13:48 Arnold Larsen MD is Attending Physician.                                             cee 

13:48 Vane Chopra RN is Primary Nurse.                                                   hb  

14:11 Placed in gown. Bed in low position. Call light in reach. Side rails up X 1. Door       mb7 

      closed. Noise minimized. Warm blanket given.                                                

14:20 Inserted saline lock: 22 gauge in left antecubital area, using aseptic technique. Blood hb  

      collected.                                                                                  

14:26 XRAY Chest (1 view) In Process Unspecified.                                             EDMS

15:10 Anthony Elkins MD is Hospitalizing Provider.                                            cee 

15:23 CT Abd/Pelvis - Without Contrast In Process Unspecified.                                EDMS

19:04 Primary Nurse role handed off by Vane Chopra RN                                    eb  

                                                                                                  

Administered Medications:                                                                         

15:13 Discontinued: NS 0.9% 1000 ml IV at 50 ml/hr continuous                                 cee 

14:49 Drug: NS 0.9% 1000 ml Route: IV; Rate: 50 ml/hr; Site: left antecubital;                hb  

15:39 Drug: Insulin Regular Human 10 units {Co-Signature: ll1 (Alison Salazar RN).} Route: IVP; hb  

      Site: left antecubital;                                                                     

18:05 Follow up: Response: No adverse reaction                                                hb  

15:40 Drug: Albuterol - atroVENT (ipratropium) (3:1) (2.5 mg - 0.5 mg) 3 ml Route: Nebulizer; hb  

18:05 Follow up: Response: No adverse reaction                                                hb  

15:40 Drug: Calcium Gluconate 1 grams Route: IVPB; Infused Over: 10 mins; Site: left          hb  

      antecubital;                                                                                

18:06 Follow up: Response: No adverse reaction                                                hb  

15:40 Drug: Sodium Bicarbonate 1 amp Route: IVP; Site: left antecubital;                      hb  

18:05 Follow up: Response: No adverse reaction                                                hb  

15:40 Drug: D50W 50 ml Route: IVP; Site: left antecubital;                                    hb  

18:05 Follow up: Response: No adverse reaction                                                hb  

15:41 Drug: Lasix (furosemide) 100 mg Route: IVP; Site: left antecubital;                     hb  

18:05 Follow up: Response: No adverse reaction                                                hb  

15:54 Drug: Kayexalate (polystyrene) 60 grams Route: PO;                                      hb  

18:05 Follow up: Response: No adverse reaction                                                hb  

                                                                                                  

                                                                                                  

Medication:                                                                                       

14:32 VIS not applicable for this client.                                                     hb  

                                                                                                  

Outcome:                                                                                          

15:12 Decision to Hospitalize by Provider.                                                    cee 

19:29 Patient left the ED.                                                                    hb  

                                                                                                  

Signatures:                                                                                       

Dispatcher [employer]                           EDMS                                                 

Arnold Larsen MD MD cha Rivera, Mary mr Williams, Irene, ELPIDIO                     RN                                                      

Vane Chopra RN                     RN   Kaykay Nuno Mary                               mb7                                                  

Alison Salazar RN                              ll1                                                  

                                                                                                  

Corrections: (The following items were deleted from the chart)                                    

17:30 17:30 Reassessment: Patient appears in no apparent distress at this time. hb            hb  

17:31 14:50  / 79; Pulse 57bpm; Resp 17bpm; Pulse Ox 98% RA; hb                         hb  

                                                                                                  

**************************************************************************************************

## 2022-07-29 NOTE — EDPHYS
Physician Documentation                                                                           

 [facility]                                                                 

Name: Juli Rushing                                                                             

Age: 66 yrs                                                                                       

Sex: Female                                                                                       

: 1955                                                                                   

MRN: [id number]                                                                                   

Arrival Date: 2022                                                                          

Time: 12:22                                                                                       

Account#: [id number]                                                                            

Bed 4                                                                                             

Private MD: Anthony Elkins ED Physician Arnold Larsen                                                                      

HPI:                                                                                              

                                                                                             

14:56 This 66 yrs old  Female presents to ER via Wheelchair with complaints of       cee 

      Diarrhea, Back Pain.                                                                        

                                                                                                  

Historical:                                                                                       

- Allergies:                                                                                      

13:17 cefepime;                                                                               iw  

13:17 Codeine;                                                                                iw  

13:17 PENICILLINS;                                                                            iw  

13:17 QUINOLONES;                                                                             iw  

13:17 Bactrim;                                                                                iw  

- PMHx:                                                                                           

13:17 Atrial fibrillation; Congestive heart failure; Dialysis; High Cholesterol; Hypertensive iw  

      disorder; stage 4 kidney failure;                                                           

- PSHx:                                                                                           

13:17 back surgery;                                                                           iw  

                                                                                                  

- Immunization history:: Client reports receiving the 2nd dose of the Covid vaccine.              

- Social history:: Smoking status: Patient denies any tobacco usage or history of.                

                                                                                                  

                                                                                                  

ROS:                                                                                              

15:00 Constitutional: Negative for fever, chills, and weight loss, Eyes: Negative for injury, cee 

      pain, redness, and discharge, ENT: Negative for injury, pain, and discharge, Neck:          

      Negative for injury, pain, and swelling, Cardiovascular: Negative for chest pain,           

      palpitations, and edema, Abdomen/GI: Negative for abdominal pain, nausea, vomiting,         

      diarrhea, and constipation, Back: Negative for injury and pain, : Negative for            

      injury, bleeding, discharge, and swelling, MS/Extremity: Negative for injury and            

      deformity, Psych: Negative for depression, anxiety, suicide ideation, homicidal             

      ideation, and hallucinations, Allergy/Immunology: Negative for hives, rash, and             

      allergies, Endocrine: Negative for neck swelling, polydipsia, polyuria, polyphagia, and     

      marked weight changes.                                                                      

15:00 Respiratory: Positive for cough, shortness of breath.                                       

15:00 Skin: Positive for pallor.                                                                  

15:00 Neuro: Positive for weakness.                                                               

                                                                                                  

Exam:                                                                                             

15:00 Constitutional:  This is a well developed, well nourished patient who is awake, alert,  cee 

      and in no acute distress. Head/Face:  Normocephalic, atraumatic. ENT:  Nares patent. No     

      nasal discharge, no septal abnormalities noted.  Tympanic membranes are normal and          

      external auditory canals are clear.  Oropharynx with no redness, swelling, or masses,       

      exudates, or evidence of obstruction, uvula midline.  Mucous membranes moist. Neck:         

      Trachea midline, no thyromegaly or masses palpated, and no cervical lymphadenopathy.        

      Supple, full range of motion without nuchal rigidity, or vertebral point tenderness.        

      No Meningismus. Chest/axilla:  Normal chest wall appearance and motion.  Nontender with     

      no deformity.  No lesions are appreciated. Cardiovascular:  Regular rate and rhythm         

      with a normal S1 and S2.  No gallops, murmurs, or rubs.  Normal PMI, no JVD.  No pulse      

      deficits. Respiratory:  Lungs have equal breath sounds bilaterally, clear to                

      auscultation and percussion.  No rales, rhonchi or wheezes noted.  No increased work of     

      breathing, no retractions or nasal flaring. Abdomen/GI:  Soft, non-tender, with normal      

      bowel sounds.  No distension or tympany.  No guarding or rebound.  No evidence of           

      tenderness throughout. Back:  No spinal tenderness.  No costovertebral tenderness.          

      Full range of motion. Female :  Normal external genitalia. MS/ Extremity:  Pulses         

      equal, no cyanosis.  Neurovascular intact.  Full, normal range of motion. Neuro:  Awake     

      and alert, GCS 15, oriented to person, place, time, and situation.  Cranial nerves          

      II-XII grossly intact.  Motor strength 5/5 in all extremities.  Sensory grossly intact.     

       Cerebellar exam normal.  Normal gait. Psych:  Awake, alert, with orientation to            

      person, place and time.  Behavior, mood, and affect are within normal limits.               

15:00 Eyes: Conjunctiva: pale, bilaterally.                                                       

15:00 ECG was reviewed by the Attending Physician.                                                

                                                                                                  

Vital Signs:                                                                                      

13:15  / 53; Pulse 64; Resp 16; Temp 98.2; Pulse Ox 99% on R/A; Weight 104.33 kg;       iw  

      Height 5 ft. 7 in. (170.18 cm);                                                             

14:50  / 79; Pulse 57; Resp 17; Pulse Ox 98% on 3 lpm NC;                               hb  

16:10  / 83; Pulse 71; Resp 21; Pulse Ox 98% on 3 lpm NC;                               hb  

17:30  / 70; Pulse 74; Resp 19; Pulse Ox 98% on 3 lpm NC;                               hb  

19:02  / 65; Pulse 74; Resp 22; Pulse Ox 98% on 3 lpm NC;                               hb  

13:15 Body Mass Index 36.02 (104.33 kg, 170.18 cm)                                            iw  

                                                                                                  

MDM:                                                                                              

13:48 Patient medically screened.                                                             cee 

15:03 Differential diagnosis: Nonspecific abd pain, gastritis, viral gastroenteritis,         cee 

      gastroenteritis. Data reviewed: vital signs, nurses notes, lab test result(s), EKG,         

      radiologic studies, CT scan, plain films. Data interpreted: Cardiac monitor: rate is 57     

      beats/min, rhythm is regular, Pulse oximetry: on room air is 98 %. Test interpretation:     

      by ED physician or midlevel provider: ECG, plain radiologic studies. Counseling: I had      

      a detailed discussion with the patient and/or guardian regarding: the historical            

      points, exam findings, and any diagnostic results supporting the discharge/admit            

      diagnosis, the presence of at least one elevated blood pressure reading (>120/80)           

      during this emergency department visit, lab results, radiology results, the need for        

      further work-up and treatment in the hospital.                                              

                                                                                                  

                                                                                             

13:27 Order name: Basic Metabolic Panel; Complete Time: 15:24                                   

                                                                                             

13:27 Order name: CBC with Diff                                                               iw  

                                                                                             

13:27 Order name: PT-INR; Complete Time: 15:24                                                iw  

                                                                                             

13:27 Order name: Troponin HS; Complete Time: 15:24                                             

                                                                                             

13:50 Order name: SARS RAPID; Complete Time: 14:56                                            [facility] 

                                                                                             

13:27 Order name: XRAY Chest (1 view); Complete Time: 14:36                                     

                                                                                             

14:47 Order name: Lipase; Complete Time: 15:24                                                [facility]

                                                                                             

14:58 Order name: CT Abd/Pelvis - Without Contrast; Complete Time: 15:50                      [facility] 

                                                                                             

13:27 Order name: EKG; Complete Time: 13:29                                                   iw  

                                                                                             

13:27 Order name: Cardiac monitoring; Complete Time: 13:54                                    iw  

                                                                                             

13:27 Order name: EKG - Nurse/Tech; Complete Time: 14:24                                        

                                                                                             

13:27 Order name: IV Saline Lock; Complete Time: 14:49                                        iw  

                                                                                             

13:27 Order name: Labs collected and sent; Complete Time: 14:49                               iw  

                                                                                             

13:27 Order name: O2 Per Protocol; Complete Time: 13:54                                         

                                                                                             

13:27 Order name: O2 Sat Monitoring; Complete Time: 13:54                                     iw  

                                                                                             

15:21 Order name: CONS Physician Consult                                                      [facility]

                                                                                                  

ECG:                                                                                              

15:00 Rate is 58 beats/min. Rhythm is regular. QRS Axis is Normal. NH interval is prolonged   cee 

      at 234 msec. QRS interval is normal. QT interval is normal. No Q waves. T waves are         

      Normal. No ST changes noted. Clinical impression: Sinus bradycardia and No evidence of      

      ischemia. Interpreted by me.                                                                

                                                                                                  

Administered Medications:                                                                         

15:13 Discontinued: NS 0.9% 1000 ml IV at 50 ml/hr continuous                                 cee 

14:49 Drug: NS 0.9% 1000 ml Route: IV; Rate: 50 ml/hr; Site: left antecubital;                hb  

15:39 Drug: Insulin Regular Human 10 units {Co-Signature: ll1 (Alison Salazar RN).} Route: IVP; hb  

      Site: left antecubital;                                                                     

18:05 Follow up: Response: No adverse reaction                                                hb  

15:40 Drug: Albuterol - atroVENT (ipratropium) (3:1) (2.5 mg - 0.5 mg) 3 ml Route: Nebulizer; hb  

18:05 Follow up: Response: No adverse reaction                                                hb  

15:40 Drug: Calcium Gluconate 1 grams Route: IVPB; Infused Over: 10 mins; Site: left          hb  

      antecubital;                                                                                

18:06 Follow up: Response: No adverse reaction                                                hb  

15:40 Drug: Sodium Bicarbonate 1 amp Route: IVP; Site: left antecubital;                      hb  

18:05 Follow up: Response: No adverse reaction                                                hb  

15:40 Drug: D50W 50 ml Route: IVP; Site: left antecubital;                                    hb  

18:05 Follow up: Response: No adverse reaction                                                hb  

15:41 Drug: Lasix (furosemide) 100 mg Route: IVP; Site: left antecubital;                     hb  

18:05 Follow up: Response: No adverse reaction                                                hb  

15:54 Drug: Kayexalate (polystyrene) 60 grams Route: PO;                                      hb  

18:05 Follow up: Response: No adverse reaction                                                hb  

                                                                                                  

                                                                                                  

Disposition Summary:                                                                              

22 15:12                                                                                    

Hospitalization Ordered                                                                           

      Provider: Anthony Elkins cha 

      Condition: Fair                                                                         cee 

      Problem: new                                                                            cee 

      Symptoms: have improved                                                                 cee 

      Bed/Room Type: Standard                                                                 cee 

      Hospitalization Status: Inpatient Admission(22 15:23)                             cee 

      Location: Intensive Care Unit(22 15:44)                                           cee 

      Room Assignment: 1-(22 18:37)                                                     dw  

      Diagnosis                                                                                   

        - Weakness                                                                            cee 

        - Dyspnea                                                                             cee 

        - Unspecified diastolic (congestive) heart failure - Volume Overload                  cee 

        - End stage renal disease - on HD                                                     cee 

        - Diarrhea, unspecified                                                               cee 

        - Hyperkalemia - 7.3                                                                  cee 

      Forms:                                                                                      

        - Medication Reconciliation Form                                                      cee 

        - SBAR form                                                                           cee 

Signatures:                                                                                       

Dispatcher [employer]                           EDSheryr Gonzales RN RN dw Anderson, Corey, MD MD cha Williams, Irene, RN RN                                                      

Vane Chopra RN                     RN                                                      

Alison Salazar RN                              ll1                                                  

                                                                                                  

Corrections: (The following items were deleted from the chart)                                    

14:46 14:09 LIPASE+C.LAB.BRZ ordered. [facility]                                                    EDMS

15:23 15:12 Observation cee                                                                   cee 

15:23 15:12 Telemetry/MedSurg (observation) cee                                               cee 

15:23 15:12 cee                                                                               cee 

15:44 15:23 Telemetry/MedSurg (Inpatient) cee                                                 cee 

15:44 15:23 cee                                                                               cee 

18:37 15:44 cee                                                                               dw  

                                                                                                  

**************************************************************************************************

## 2022-07-29 NOTE — RAD REPORT
EXAM DESCRIPTION:  RAD - Chest Single View - 7/29/2022 2:23 pm

 

CLINICAL HISTORY:  MALAISE

Chest pain.

 

COMPARISON:  Chest Single View dated 3/13/2022; Chest Single View dated 1/12/2022; Chest Single View 
dated 1/5/2022

 

FINDINGS:  Portable technique limits examination quality.

 

Atelectasis is present in the right lung base with elevation of right hemidiaphragm. Mild interstitia
l pulmonary edema is present. The heart is moderately enlarged. Left-sided venous catheter is unchang
ed in position. No displaced fractures.

 

IMPRESSION:  Mild CHF versus volume overload.

 

Atelectasis in the right lung base with elevated right hemidiaphragm.

## 2022-07-29 NOTE — RAD REPORT
EXAM DESCRIPTION:  CT - Abdomen   Pelvis Wo Contrast - 7/29/2022 3:21 pm

 

CLINICAL HISTORY:  Abdominal pain.

Abdominal pain, acute, nonlocalized

 

COMPARISON:  Abdomen   Pelvis Wo Contrast dated 3/13/2022

 

TECHNIQUE:  CT imaging of the abdomen and pelvis was performed without contrast. Solid organ, bowel a
nd vascular assessment is limited due to lack of IV and oral contrast.

 

All CT scans are performed using dose optimization technique as appropriate and may include automated
 exposure control or mA/KV adjustment according to patient size.

 

FINDINGS:  Linear atelectasis is present both lung bases with trace bilateral pleural effusions.

 

Localize fluid collection seen inferior to right lobe of the liver is again noted.This appears slight
ly larger than on the prior study but pain is nonaggressive in appearance. Exact etiology of this les
ion is unclear. Cholecystectomy.

 

The spleen, pancreas and adrenal glands are normal. Stones present in the left pelvicaliceal system a
re unchanged with atrophic left kidney. No hydronephrosis appear right kidney contains several simple
 appearing cysts. No hydronephrosis.

 

Mild free fluid is seen in the pelvis.  No bowel obstruction or free air.

 

Vertebroplasty cement is noted in the L1 level.Mild compression deformity of L3 is unchanged.

 

IMPRESSION:  No acute intra-abdominal or pelvic findings. Right upper quadrant localized fluid collec
tion is of unclear etiology appears mildly larger than on the prior study. It is nonaggressive in carl
earance.

 

A limited non-contrast examination was performed as detailed.

## 2022-07-29 NOTE — P.HP
Certification for Inpatient


Patient admitted to: Inpatient


With expected LOS: >2 Midnights


Practitioner: I am a practitioner with admitting privileges, knowledge of 

patient current condition, hospital course, and medical plan of care.


Services: Services provided to patient in accordance with Admission requirements

found in Title 42 Section 412.3 of the Code of Federal Regulations





Patient History


Date of Service: 07/29/22


Primary Care Provider: Brittni


Reason for admission: non compliance with dialysis


History of Present Illness: 





Office patient of mine.  She has a history of chf, esrd.  The patient has covid 

2 weeks ago.  Was started on paxlovid.  She developed some nausea and diarrhea 

this week.  She was sent in some phenergan.  However the patient was still 

having diarrhea.  Brought her into the office today.  She then revealed she had 

not been to dialysis since last Saturday.  Her normal dialysis doctor is Dr. Sood.  The patient was sent to the ER.  Was found to have an elevated 

potassium and bun 


Allergies





cefepime Allergy (Verified 01/05/22 06:30)


   Hives


levofloxacin Allergy (Verified 01/05/22 06:30)


   Hives/Rash


Penicillins Allergy (Verified 01/05/22 06:30)


   Hives/Rash





Home Medications: 








Sulfamethoxazole/Trimethoprim [Bactrim 400-80 mg Tablet] 1 each PO BID 5 Days 

#10 tablet 01/05/22 








- Past Medical/Surgical History


Diabetic: No


-: Chronic hypotension


-: Hyperlipidemia


-: Chronic anticoagulation use


-: History of nephrolithiasis requiring stent placement


-: Recurrent UTI


-: End-stage renal disease


-: tubal ligation


-: dialysis port





- Social History


Alcohol use: No


CD- Drugs: No


Caffeine use: No





Review of Systems


10-point ROS is otherwise unremarkable


Gastrointestinal: Diarrhea





Physical Examination





- Physical Exam


General: Alert, In no apparent distress


HEENT: Atraumatic, PERRLA, Mucous membr. moist/pink, EOMI, Sclerae nonicteric


Neck: Supple, 2+ carotid pulse no bruit, No LAD, Without JVD or thyroid 

abnormality


Respiratory: Clear to auscultation bilaterally, Normal air movement


Cardiovascular: Regular rate/rhythm, Normal S1 S2


Gastrointestinal: Normal bowel sounds, No tenderness


Musculoskeletal: No tenderness


Integumentary: No rashes


Neurological: Normal gait, Normal speech, Normal strength at 5/5 x4 extr, Normal

tone, Normal affect


Lymphatics: No axilla or inguinal lymphadenopathy





- Studies


Laboratory Data (last 24 hrs)





07/29/22 14:34: PT 16.4 H, INR 1.48


07/29/22 14:34: Sodium 135 L, Potassium 7.3 H*, BUN 84 H, Creatinine 13.10 H*, 

Glucose 97, Lipase 86


07/29/22 13:50: Lipase Cancelled








Assessment and Plan





- Problems (Diagnosis)


(1) ESRD (end stage renal disease) on dialysis


Current Visit: No   Status: Chronic   


Plan: 


Plans for dialysis with Dr. Bernal








(2) CHF (congestive heart failure)


Current Visit: Yes   Status: Chronic   


Plan: 


we can get a consult with Dr. Sullivan.  she needs to establish with a local 

cardiologist.  Will try getting a echocardiogram as we need a baseline 

assessment.  


Qualifiers: 


   Heart failure type: unspecified   Heart failure chronicity: chronic   

Qualified Code(s): I50.9 - Heart failure, unspecified   





(3) Back pain


Current Visit: Yes   Status: Chronic   


Plan: 


will give her pain medication in house.  Will need to work up as an outpatient 


Qualifiers: 


   Back pain location: low back pain 





(4) Non-compliance with renal dialysis


Current Visit: Yes   Status: Chronic   


Plan: 


She has only been in the area for a few months.  This is her second admission 

for non compliance.  She is not very good at follow up 





Discharge Plan: Home


Plan to discharge in: 48 Hours





- Advance Directives


Does patient have a Living Will: No


Does patient have a Durable POA for Healthcare: No





- Code Status/Comfort Care


Code Status Assessed: No


Code Status: Full Code


Physician Review: Patient Assessed, Agree with Above Assessment and Plan


Critical Care: No


Time Spent Managing Pts Care (In Minutes): 75

## 2022-07-30 LAB
BUN BLD-MCNC: 43 MG/DL (ref 7–18)
GLUCOSE SERPLBLD-MCNC: 94 MG/DL (ref 74–106)
HCT VFR BLD CALC: 24.3 % (ref 36–45)
LYMPHOCYTES # SPEC AUTO: 1.2 K/UL (ref 0.7–4.9)
MCV RBC: 104.6 FL (ref 80–100)
PMV BLD: 8.1 FL (ref 7.6–11.3)
POTASSIUM SERPL-SCNC: 5 MMOL/L (ref 3.5–5.1)
RBC # BLD: 2.33 M/UL (ref 3.86–4.86)

## 2022-07-30 PROCEDURE — 5A1D70Z PERFORMANCE OF URINARY FILTRATION, INTERMITTENT, LESS THAN 6 HOURS PER DAY: ICD-10-PCS

## 2022-07-30 RX ADMIN — HEPARIN SODIUM SCH UNIT: 5000 INJECTION, SOLUTION INTRAVENOUS; SUBCUTANEOUS at 08:23

## 2022-07-30 RX ADMIN — HEPARIN SODIUM SCH UNIT: 5000 INJECTION, SOLUTION INTRAVENOUS; SUBCUTANEOUS at 20:48

## 2022-07-30 RX ADMIN — HYDROMORPHONE HYDROCHLORIDE PRN MG: 1 INJECTION, SOLUTION INTRAMUSCULAR; INTRAVENOUS; SUBCUTANEOUS at 21:23

## 2022-07-30 RX ADMIN — Medication SCH ML: at 08:23

## 2022-07-30 RX ADMIN — ONDANSETRON PRN MG: 2 INJECTION INTRAMUSCULAR; INTRAVENOUS at 16:36

## 2022-07-30 RX ADMIN — ACETAMINOPHEN SCH MG: 325 TABLET ORAL at 16:35

## 2022-07-30 RX ADMIN — PANTOPRAZOLE SODIUM SCH MG: 40 TABLET, DELAYED RELEASE ORAL at 05:41

## 2022-07-30 RX ADMIN — HYDROMORPHONE HYDROCHLORIDE PRN MG: 1 INJECTION, SOLUTION INTRAMUSCULAR; INTRAVENOUS; SUBCUTANEOUS at 11:14

## 2022-07-30 RX ADMIN — Medication SCH ML: at 20:49

## 2022-07-30 RX ADMIN — ONDANSETRON PRN MG: 2 INJECTION INTRAMUSCULAR; INTRAVENOUS at 08:24

## 2022-07-30 RX ADMIN — ONDANSETRON PRN MG: 2 INJECTION INTRAMUSCULAR; INTRAVENOUS at 21:22

## 2022-07-30 NOTE — CON
Date of Consultation:  07/30/2022



Reason For Consultation:  Congestive heart failure.



History Of Present Illness:  This is a middle-aged female with history of end-stage renal disease, on
 hemodialysis, not compliant, dyslipidemia, hypertension, who presented to the emergency room with na
usea and diarrhea.  Denies having any chest pain.  There is no shortness of breath.  No orthopnea or 
cough.  No other complaints.



Past Medical History:  As outlined above in the HPI.



Medications:  Refer reconciliation sheet for detailed list.



Allergies:  REVIEWED.  REFER TO NURSE'S NOTES.



Family History:  No premature coronary artery disease or cancer.



Social History:  Does not smoke or drink.  Does not use any drugs.



Review of Systems:

All systems reviewed and are negative except above mentioned in HPI.



Physical Examination:

Vital Signs:  Reviewed. 

Head And Neck:  Pupils are equal and reactive to light.  Intact eye movements.  No JVD.  No cervical 
lymphadenopathy. 

Neck:  Supple.  Thyroid not enlarged. 

Lungs:  Clear to auscultation bilaterally.  No rhonchi, rales, or crackles.  No accessory muscle use.
 

Heart:  Regular rate and rhythm.  No extra sounds. 

Abdomen:  Soft, nontender.  Bowel sounds positive. 

Extremities:  No clubbing or cyanosis.  Intact pulses. 

Skin:  No rash. 

Neurologic:  Alert, awake, and oriented x3.  No acute focal deficits are appreciated.



Investigations:  Troponins are negative.  Her creatinine was 13 and now at 8.6.  Hemoglobin is 8.4.



Assessment And Recommendations:  

1.History of diastolic congestive heart failure.  She seems to be euvolemic now.  Fluid management t
o be carried on by Nephrology during dialysis to get her ideal dry body weight.  From the cardiac sta
ndpoint, the patient does not need any inpatient cardiac workup.  We will plan for outpatient echo an
d stress test.

2.End-stage renal disease, noncompliant.  She is encouraged to stick to her schedule and the patient
 is being dialyzed as an inpatient and she appears to be comfortable.

3.Hypertension.  Blood pressure is controlled.





SR/MODL

DD:  07/30/2022 15:48:22Voice ID:  [id number]

DT:  07/30/2022 20:42:00Report ID:  [id number]

## 2022-07-30 NOTE — PN
Date of Progress Note:  07/30/2022



Subjective:  The patient was admitted with end-stage renal disease, over volume,
and hyperkalemia because she missed her dialysis multiple times over the week.  
The patient had dialysis yesterday, tolerated well.  The patient is feeling 
well.  The patient is waiting for echocardiogram on Monday.  Still has some 
shortness of breath.  Scheduled for another session of dialysis today.



Physical Examination:

Vital Signs:  Blood pressure 115/64, pulse of 77, afebrile.  The patient had 
ultrafiltration of 1700 yesterday. 

Chest:  Crackles bilateral, more prominent on the left side. 

Heart:  S1, S2.  Systolic murmur. 

Abdomen:  Morbidly obese.  Could not appreciate any organomegaly. 

Extremities:  Trace edema. 

Neuro:  Alert, no focality.



Laboratory Data:  WBC 5.5, H and H 8.4/24.3.  Sodium 137, potassium 5, bicarb 
26, BUN 43, creatinine 8, calcium 8.8, TSH 1.8.



Current Medications:  The patient on include:

1.   Heparin.

2.   Tylenol.

3.   Breathing treatment.

4.   Hydrocodone.



Assessment And Plan:  

1.   End-stage renal disease, over volume with hyperkalemia status post dialysis
yesterday.  We will repeat another session of dialysis today.  Then, we will go 
back to her schedule as TTS.

2.   Hyperkalemia, resolved.  The patient dialyzed on low-potassium bath.  We 
will repeat the session today.

3.   Over volume.  The patient is going to be challenged again today.

4.   Anemia of chronic kidney disease.  I am going to go ahead and resume 
Retacrit for the patient.

5.   Secondary hyperparathyroidism.  We will follow up phos for level.

6.   Deconditioning as by primary.

7.   Congestive heart failure with exacerbation.  We will challenge the patient 
today.  We will follow up the progress.  The patient is 

going to be scheduled for echocardiogram, Monday.

8.   Acidosis marginal, going to be corrected with dialysis.



Time spent examining the patient face-to-face, reviewing the data, placing 
order, discussing with  by bedside, discussing with staff member 
including charge nurse and nursing and hospitalist 45 minutes.



KHUSHI

DD:  07/30/2022 12:33:39   Voice ID:  [id number]

DT:  07/30/2022 13:28:42   Report ID:  [id number]

ANANTH

## 2022-07-30 NOTE — P.PN
Subjective


Date of Service: 07/30/22


Primary Care Provider: Brittni


Chief Complaint: non compliance with dialysis


Subjective: No new changes





Review of Systems


10-point ROS is otherwise unremarkable


Gastrointestinal: Nausea


Musculoskeletal: Back Pain





Physical Examination





- Vital Signs


Temperature: 97.5 F


Blood Pressure: 115/64


Pulse: 77


Respirations: 17


Pulse Ox (%): 98





- Physical Exam


General: Alert, In no apparent distress


HEENT: Atraumatic, PERRLA, EOMI


Neck: Supple, JVD not distended


Respiratory: Clear to auscultation bilaterally, Normal air movement


Cardiovascular: Regular rate/rhythm, Normal S1 S2


Gastrointestinal: Normal bowel sounds, No tenderness


Musculoskeletal: No tenderness


Integumentary: No rashes


Neurological: Normal speech, Normal tone, Normal affect


Lymphatics: No axilla or inguinal lymphadenopathy





- Studies


Laboratory Data (last 24 hrs)





07/29/22 14:34: PT 16.4 H, INR 1.48


07/29/22 14:34: Sodium 135 L, Potassium 7.3 H*, BUN 84 H, Creatinine 13.10 H*, 

Glucose 97, Lipase 86


07/29/22 13:50: Lipase Cancelled








Assessment And Plan





- Current Problems (Diagnosis)


(1) ESRD (end stage renal disease) on dialysis


Current Visit: No   Status: Chronic   


Plan: 


Her labs are improved.  potassium is back to normal.  Will  continue dialysis 

with Dr. Medrano








(2) CHF (congestive heart failure)


Current Visit: Yes   Status: Chronic   


Plan: 


we can get a consult with Dr. Sullivan.  she needs to establish with a local 

cardiologist.  Will try getting a echocardiogram as we need a baseline 

assessment.  Order an echo for Monday 


Qualifiers: 


   Heart failure type: unspecified   Heart failure chronicity: chronic   

Qualified Code(s): I50.9 - Heart failure, unspecified   





(3) Back pain


Current Visit: Yes   Status: Chronic   


Plan: 


will give her pain medication in house.  Will need to work up as an outpatient 


Qualifiers: 


   Back pain location: low back pain 





(4) Non-compliance with renal dialysis


Current Visit: Yes   Status: Chronic   


Plan: 


She has only been in the area for a few months.  This is her second admission 

for non compliance.  She is not very good at follow up 





7/30  


Have discussed the patient with Dr. Medrano.  She is historically non co

mpliant.  He will offer her peritoneal dialysis again 





Discharge Plan: Home


Plan to discharge in: Greater than 2 days





- Code Status/Comfort Care


Code Status Assessed: No


Physician Review: Patient Assessed, Agree with Above Assessment and Plan


Critical Care: No


Time Spent Managing PTS Care (In Minutes): 20

## 2022-07-31 LAB
ALBUMIN SERPL BCP-MCNC: 3 G/DL (ref 3.4–5)
BUN BLD-MCNC: 22 MG/DL (ref 7–18)
GLUCOSE SERPLBLD-MCNC: 91 MG/DL (ref 74–106)
POTASSIUM SERPL-SCNC: 4.5 MMOL/L (ref 3.5–5.1)

## 2022-07-31 RX ADMIN — HEPARIN SODIUM SCH UNIT: 5000 INJECTION, SOLUTION INTRAVENOUS; SUBCUTANEOUS at 21:16

## 2022-07-31 RX ADMIN — Medication SCH ML: at 09:00

## 2022-07-31 RX ADMIN — ACETAMINOPHEN SCH MG: 325 TABLET ORAL at 16:42

## 2022-07-31 RX ADMIN — PANTOPRAZOLE SODIUM SCH MG: 40 TABLET, DELAYED RELEASE ORAL at 05:41

## 2022-07-31 RX ADMIN — ACETAMINOPHEN SCH MG: 325 TABLET ORAL at 09:11

## 2022-07-31 RX ADMIN — ONDANSETRON PRN MG: 2 INJECTION INTRAMUSCULAR; INTRAVENOUS at 05:41

## 2022-07-31 RX ADMIN — HEPARIN SODIUM SCH UNIT: 5000 INJECTION, SOLUTION INTRAVENOUS; SUBCUTANEOUS at 09:11

## 2022-07-31 RX ADMIN — HYDROMORPHONE HYDROCHLORIDE PRN MG: 1 INJECTION, SOLUTION INTRAMUSCULAR; INTRAVENOUS; SUBCUTANEOUS at 05:41

## 2022-07-31 RX ADMIN — HYDROMORPHONE HYDROCHLORIDE PRN MG: 1 INJECTION, SOLUTION INTRAMUSCULAR; INTRAVENOUS; SUBCUTANEOUS at 21:17

## 2022-07-31 RX ADMIN — Medication SCH ML: at 21:16

## 2022-07-31 RX ADMIN — ACETAMINOPHEN SCH MG: 325 TABLET ORAL at 00:57

## 2022-07-31 RX ADMIN — HYDROMORPHONE HYDROCHLORIDE PRN MG: 1 INJECTION, SOLUTION INTRAMUSCULAR; INTRAVENOUS; SUBCUTANEOUS at 15:43

## 2022-07-31 NOTE — RAD REPORT
EXAM DESCRIPTION:  RAD - Chest Single View - 7/31/2022 2:11 pm

 

CLINICAL HISTORY:  COPD

 

COMPARISON:  Chest Single View dated 7/29/2022; Chest Single View dated 3/13/2022; Chest Single View 
dated 1/12/2022; Chest Single View dated 1/5/2022

 

FINDINGS:  Lines: Left IJ approach dialysis catheter with tip overlying the right atrium.

Lungs: Low lung volumes which accentuates the pulmonary vasculature.

Pleural: No significant pleural effusions or pneumothorax.

Cardiac: Similar cardiomegaly .

Bones: No acute fractures.

Other:

 

IMPRESSION:  Similar low lung volumes. This accentuates the pulmonary vasculature.

## 2022-07-31 NOTE — EKG
Test Date:    2022-07-29               Test Time:    14:10:33

Technician:   MB                                     

                                                     

MEASUREMENT RESULTS:                                       

Intervals:                                           

Rate:         58                                     

NE:           234                                    

QRSD:         92                                     

QT:           386                                    

QTc:          378                                    

Axis:                                                

P:            35                                     

NE:           234                                    

QRS:          97                                     

T:            7                                      

                                                     

INTERPRETIVE STATEMENTS:                                       

                                                     

Sinus bradycardia with 1st degree AV block

Rightward axis

Low voltage QRS

Incomplete right bundle branch block

Cannot rule out Anterior infarct, age undetermined

Abnormal ECG



Electronically Signed On 07-31-22 16:57:25 CDT by Venancio Barajas

## 2022-07-31 NOTE — P.PN
Subjective


Date of Service: 07/31/22


Primary Care Provider: Brittni


Chief Complaint: non compliance with dialysis


Subjective: No new changes (Patient is complainting about nausea and diarrhea.  

She was asked about these symptoms by nursing and stated no to all these symptom

s earlier)





Review of Systems


10-point ROS is otherwise unremarkable


Gastrointestinal: Nausea, Diarrhea





Physical Examination





- Vital Signs


Temperature: 97.3 F


Blood Pressure: 137/69


Pulse: 79


Respirations: 20


Pulse Ox (%): 96





- Physical Exam


General: Alert, In no apparent distress


HEENT: Atraumatic, PERRLA, EOMI


Neck: Supple, JVD not distended


Respiratory: Clear to auscultation bilaterally, Normal air movement


Cardiovascular: Regular rate/rhythm, Normal S1 S2


Gastrointestinal: Normal bowel sounds, No tenderness


Musculoskeletal: No tenderness


Integumentary: No rashes


Neurological: Normal speech, Normal tone, Normal affect


Lymphatics: No axilla or inguinal lymphadenopathy





Assessment And Plan





- Current Problems (Diagnosis)


(1) ESRD (end stage renal disease) on dialysis


Current Visit: No   Status: Chronic   


Plan: 


Her labs are improved.  potassium is back to normal.  Will  continue dialysis 

with Dr. Medrano








(2) CHF (congestive heart failure)


Current Visit: Yes   Status: Chronic   


Plan: 


we can get a consult with Dr. Sullivan.  she needs to establish with a local 

cardiologist.  Will try getting a echocardiogram as we need a baseline 

assessment.  Order an echo for Monday. 


Plans for discharge after cardiology consult and echo 


Qualifiers: 


   Heart failure type: unspecified   Heart failure chronicity: chronic   

Qualified Code(s): I50.9 - Heart failure, unspecified   





(3) Back pain


Current Visit: Yes   Status: Chronic   


Plan: 


will give her pain medication in house.  Will need to work up as an outpatient 


Qualifiers: 


   Back pain location: low back pain 





(4) Non-compliance with renal dialysis


Current Visit: Yes   Status: Chronic   


Plan: 


She has only been in the area for a few months.  This is her second admission 

for non compliance.  She is not very good at follow up 





7/30  


Have discussed the patient with Dr. Medrano.  She is historically non 

compliant.  He will offer her peritoneal dialysis again 





Discharge Plan: Home





- Code Status/Comfort Care


Code Status Assessed: No


Physician Review: Patient Assessed, Agree with Above Assessment and Plan


Critical Care: No


Time Spent Managing PTS Care (In Minutes): 20

## 2022-07-31 NOTE — PN
Date of Progress Note:  07/31/2022



Subjective:  The patient was admitted with hyperkalemia, over volume.  The 
patient received dialysis back-to-back, last dialysis yesterday, tolerated well.
 We managed to remove 2700.  The patient is still on nasal cannula.



Physical Examination:

Vital Signs:  Blood pressure 137/69, pulse of 79, afebrile. 

Chest:  Crackles bilateral base. 

Heart:  S1, S2.  Systolic murmur. 

Abdomen:  Soft, nontender. 

Extremity:  No edema. 

Neuro:  Alert.  No focality.



Laboratory Data:  WBC 5.5, H and H 8.4/24.3.  Sodium 138, potassium 4.5, bicarb 
30, BUN 22, creatinine 0.5, calcium 8.8, phosphorus 4.3.



Current Medications:  The patient on include;

1.   Epogen.

2.   Breathing treatment.

3.   Hydromorphone.



Assessment And Plan:  

1.   End-stage renal disease, over volume.  We are going to resume dialysis TTS.

2.   Secondary hyperparathyroidism, stable.  Continue current treatment.

3.   Anemia of chronic kidney disease.  Continue LENARD.

4.   Hypertension, controlled, optimal.  We will continue to utilize blood 
pressure for ultrafiltration.

5.   Congestive heart failure with exacerbation.  The patient planned for 
echocardiogram tomorrow.  We will follow up.



Time spent examining the patient face-to-face, reviewing the data, placing 
order, discussing with  by bedside, discussing with staff member 
including charge nurse and nursing and hospitalist 35 minutes.



KHUSHI

DD:  07/31/2022 13:33:04   Voice ID:  [id number]

DT:  07/31/2022 14:04:18   Report ID:  [id number]

ANANTH

## 2022-07-31 NOTE — PN
Date of Progress Note:  07/31/2022



Subjective:  Seen by bedside.  Doing clinically well.  No shortness of breath.  No chest pain.



Review of Systems:

No chest pain, shortness of breath, orthopnea, cough.  No nausea, vomiting, diarrhea.  All other syst
ems reviewed and they were negative.



Physical Examination:

Vital Signs:  Reviewed. 

Head and Neck:  Pupils are equal, reactive to light.  Intact eye movements.  No JVD.  No cervical lym
phadenopathy.  Neck is supple.  Thyroid is not enlarged. 

Lungs:  Clear to auscultation bilaterally.  No rhonchi, wheezing, or crackles.  No accessory muscle u
se. 

Heart:  Regular rate and rhythm.  No extra sounds. 

Abdomen:  Soft, nontender.  Bowel sounds positive.  No organomegaly.  No masses or hernia.  No rigidi
ty or rebound. 

Extremities:  No edema, clubbing, or cyanosis.  Intact pulses. 

Skin:  No rash. 

Neurologic:  Alert, awake.  No acute focal deficits appreciated.



Investigations:  Creatinine 5.56 today and troponins were negative.



Assessment And Recommendations:  

1.Acute on chronic diastolic heart failure exacerbation, seems to be euvolemic after dialysis, doing
 well.  Await on echocardiogram, which will be done tomorrow morning.  I discussed the patient the im
portance of being compliant with dialysis and she voiced understanding.

2.End-stage renal disease with fluid retention and very high creatinine due to noncompliance with di
alysis, status post 

inpatient dialysis and the patient appears to be euvolemic at the present time.





/MODL

DD:  07/31/2022 16:40:56Voice ID:  [id number]

DT:  07/31/2022 16:59:58Report ID:  [id number]

## 2022-08-01 VITALS — DIASTOLIC BLOOD PRESSURE: 65 MMHG | SYSTOLIC BLOOD PRESSURE: 140 MMHG | TEMPERATURE: 97.5 F

## 2022-08-01 VITALS — OXYGEN SATURATION: 99 %

## 2022-08-01 LAB
ALBUMIN SERPL BCP-MCNC: 2.8 G/DL (ref 3.4–5)
BUN BLD-MCNC: 24 MG/DL (ref 7–18)
GLUCOSE SERPLBLD-MCNC: 132 MG/DL (ref 74–106)
POTASSIUM SERPL-SCNC: 4.6 MMOL/L (ref 3.5–5.1)

## 2022-08-01 RX ADMIN — Medication SCH ML: at 09:00

## 2022-08-01 RX ADMIN — ACETAMINOPHEN SCH MG: 325 TABLET ORAL at 09:55

## 2022-08-01 RX ADMIN — ACETAMINOPHEN SCH MG: 325 TABLET ORAL at 00:34

## 2022-08-01 RX ADMIN — HEPARIN SODIUM SCH UNIT: 5000 INJECTION, SOLUTION INTRAVENOUS; SUBCUTANEOUS at 09:55

## 2022-08-01 RX ADMIN — PANTOPRAZOLE SODIUM SCH MG: 40 TABLET, DELAYED RELEASE ORAL at 06:20

## 2022-08-01 NOTE — P.DS
Admission Date: 07/30/22


Discharge Date: 08/01/22


Primary Care Provider: Brittni


Disposition: ROUTINE DISCHARGE


Discharge Condition: GOOD


Reason for Admission: non compliance with dialysis





- Problems


(1) ESRD (end stage renal disease) on dialysis


Status: Chronic   





(2) CHF (congestive heart failure)


Status: Chronic   


Qualifiers: 


   Heart failure type: unspecified   Heart failure chronicity: chronic   

Qualified Code(s): I50.9 - Heart failure, unspecified   





(3) Back pain


Status: Chronic   


Qualifiers: 


   Back pain location: low back pain 





(4) Non-compliance with renal dialysis


Status: Chronic   


Brief History of Present Illness: 





Office patient of mine.  She has a history of chf, esrd.  The patient has covid 

2 weeks ago.  Was started on paxlovid.  She developed some nausea and diarrhea 

this week.  She was sent in some phenergan.  However the patient was still 

having diarrhea.  Brought her into the office today.  She then revealed she had 

not been to dialysis since last Saturday.  Her normal dialysis doctor is Dr. Sood.  The patient was sent to the ER.  Was found to have an elevated 

potassium and bun 


Hospital Course: 





Patient was admitted for non compliance with dialysis.  Was seen by Dr. Medrano.  Who ran his dialysis.  The patient was also seen by Dr. Barajas.  This

was to establish with a local cardiologist.  We have gotten an echocardiogram.  

Will discharge him today and have the patient follow up in the office.  thank 

you for allowing me to take part in her care. 


Vital Signs/Physical Exam: 














Temp Pulse Resp BP Pulse Ox


 


 97.5 F   78   16   140/65   100 


 


 08/01/22 12:00  08/01/22 12:00 08/01/22 12:00  08/01/22 12:00 08/01/22 12:00








General: Alert, In no apparent distress


HEENT: Atraumatic, PERRLA, EOMI


Neck: Supple, JVD not distended


Respiratory: Clear to auscultation bilaterally, Normal air movement


Cardiovascular: Regular rate/rhythm, Normal S1 S2


Gastrointestinal: Normal bowel sounds, No tenderness


Musculoskeletal: No tenderness


Integumentary: No rashes


Neurological: Normal speech, Normal tone, Normal affect


Lymphatics: No axilla or inguinal lymphadenopathy


Laboratory Data at Discharge: 














WBC  5.5 K/uL (4.3-10.9)  D 07/30/22  04:58    


 


Hgb  8.4 g/dL (12.0-15.0)  L  07/30/22  04:58    


 


Hct  24.3 % (36.0-45.0)  L  07/30/22  04:58    


 


Plt Count  102 K/uL (152-406)  L  07/30/22  04:58    


 


PT  16.4 SECONDS (9.5-12.5)  H  07/29/22  14:34    


 


INR  1.48   07/29/22  14:34    


 


Sodium  140 mmol/L (136-145)   08/01/22  03:26    


 


Potassium  4.6 mmol/L (3.5-5.1)   08/01/22  03:26    


 


BUN  24 mg/dL (7-18)  H  08/01/22  03:26    


 


Creatinine  7.04 mg/dL (0.55-1.3)  H* D 08/01/22  03:26    


 


Glucose  132 mg/dL ()  H  08/01/22  03:26    


 


Phosphorus  5.3 mg/dL (2.5-4.9)  H  08/01/22  03:26    


 


Lipase  86 U/L ()   07/29/22  14:34    








Home Medications: 








Apixaban [Eliquis] 1 tab PO BID 07/29/22 


Atorvastatin Calcium 1 tab PO BEDTIME 07/29/22 


Brimonidine Tartrate [Alphagan P] 1 drop EACH EYE BID 07/29/22 


Bumetanide 1 tab PO DAILY 07/29/22 


Diclofenac Na [Voltaren D.r*] 1 tab PO BID 07/29/22 


Digoxin [Lanoxin] 1 tab PO T,TH,S 07/29/22 


Folic Acid 1 tab PO DAILY 07/29/22 


Glucosamine/D3/Boswellia Pauline [Osteo Bi-Flex Tablet] 1 tab PO DAILY 07/29/22 


Melatonin 1 tab SL BEDTIME 07/29/22 


Metoprolol Tartrate 1 tab PO BID 07/29/22 


Midodrine HCl 1 tab PO TID 07/29/22 


Pantoprazole [Protonix Tab*] 1 tab PO DAILY 07/29/22 


Promethazine HCl 1 tab PO TID PRN 07/29/22 


Sevelamer Carbonate [Renvela] 2 tab PO TID 07/29/22 


Sucroferric Oxyhydroxide [Velphoro] 1 tab PO DAILY 07/29/22 


Tramadol HCl [Ultram] 1 tab PO Q12H 07/29/22 


Diphenox/Atropine [Lomotil] 1 tab PO TIDP PRN 7 Days #20 tab 08/01/22 


Promethazine Tab [Phenergan] 25 mg PO Q6HP PRN 7 Days #20 tab 08/01/22 





New Medications: 


Diphenox/Atropine [Lomotil] 1 tab PO TIDP PRN 7 Days #20 tab


 PRN Reason: Diarrhea


Promethazine Tab [Phenergan] 25 mg PO Q6HP PRN 7 Days #20 tab


 PRN Reason: Nausea / Vomiting


Diet: Renal


Activity: Ad susan


Followup: 


Anthony Elkins MD [Primary Care Provider] - 1 Week


(Call to schedule appointment.


)


Venancio Barajas MD [ACTIVE - CAN ADMIT] - 1-2 Weeks (Call to schedule 

appointment.)


Physician Review: Patient Assessed, Agree with Above Assessment and Plan


Time spent managing pt's care (in minutes): 30

## 2022-08-01 NOTE — ECHO
HEIGHT: 5 ft 7 in   WEIGHT: 228 lb 9.6 oz   DATE OF STUDY: 08/01/2022   REFER DR: Anthony Elkins MD

2-DIMENSIONAL: YES

     M.MODE: YES

 DOPPLER: YES

COLOR FLOW: YES



                    TDS:  NO

PORTABLE: YES

 DEFINITY:  NO

BUBBLE STUDY: NO





DIAGNOSIS:  CONGESTIVE HEART FAILURE



CARDIAC HISTORY:  

CATHERIZATION: NO

SURGERY: NO

PROSTHETIC VALVE: NO

PACEMAKER: NO





MEASUREMENTS (cm)

    DIASTOLIC (NORMALS)                 SYSTOLIC (NORMALS)

IVSd                 1.1 (0.6-1.2)                    LA Diam 4.9 (1.9-4.0)     LVEF       
  55%  

LVIDd               3.5 (3.5-5.7)                        LVIDs      2.5 (2.0-3.5)     %FS  
        28%

LVPWd             1.1 (0.6-1.2)

Ao Diam           2.6 (2.0-3.7)



2 DIMENSIONAL ASSESSMENT:

RIGHT ATRIUM:                   NORMAL

LEFT ATRIUM:       ENLARGED



RIGHT VENTRICLE:            NORMAL

LEFT VENTRICLE: NORMAL



TRICUSPID VALVE:             

MITRAL VALVE:     



PULMONIC VALVE:             NORMAL

AORTIC VALVE:     NORMAL



PERICARDIAL EFFUSION: NONE

AORTIC ROOT:      NORMAL





LEFT VENTRICULAR WALL MOTION:     NORMAL 



DOPPLER/COLOR FLOW:     SEE BELOW



COMMENTS:      NORMAL LEFT VENTRICULAR EJECTION FRACTION 55-60% WITH NORMAL WALL MOTION. 
MODERATE TO SEVERE TRICUSPID AND MITRAL REGURGITATION. LEFT ATRIAL ENLARGEMENT. MODERATE 
DIASTOLIC DYSFUNCTION. SEVERE PULMONARY HYPERTENSION WITH RIGHT VENTRICULAR SYSTOLIC 
PRESSURE >60 mmHg.



TECHNOLOGIST:   RILEY SARMIENTO

## 2022-08-01 NOTE — PN
Date of Progress Note:  08/01/2022



Subjective:  Seen by bedside.  She feels well.  Does not have any chest pain or shortness of breath.



Review of Systems:

No chest pain, shortness of breath, orthopnea, cough.  No nausea, vomiting, diarrhea.  All other syst
ems reviewed and they were negative.



Physical Examination:

Vital Signs:  Reviewed. 

Head and Neck:  Pupils are equal, reactive to light.  Intact eye movements.  No JVD.  No cervical lym
phadenopathy.  Neck is supple.  Thyroid is not enlarged. 

Lungs:  Clear to auscultation bilaterally.  No rhonchi, wheezing, or crackles.  No accessory muscle u
se. 

Heart:  Regular rate and rhythm.  No extra sounds. 

Abdomen:  Soft, nontender.  Bowel sounds positive.  No organomegaly.  No masses or hernia.  No rigidi
ty or rebound. 

Extremities:  No clubbing or cyanosis.  Intact pulses. 

Skin:  No rash. 

Neurologic:  Alert, awake.  No acute focal deficits appreciated.



Investigations:  On echo; she has moderate-to-severe MR and moderate-to-severe TR, normal ejection fr
action, and moderate diastolic dysfunction.



Assessment And Recommendations:  

1.Diastolic congestive heart failure.  This is chronic.  Needs fluid management through dialysis.  T
he patient understands to keep compliant with her dialysis sessions.

2.Mitral valve regurgitation, appears severe.  However, recommend outpatient transesophageal echocar
diogram to further assess the mitral valve and tricuspid valve.  The patient likely will need surgica
l intervention on the above valves. 

From my standpoint, this patient to be released and I will follow her 

up as an outpatient and arrange for RUPA if the patient chooses to be compliant.





/JUAREZ

DD:  08/01/2022 12:59:10Voice ID:  [id number]

DT:  08/01/2022 17:40:50Report ID:  [id number]

## 2022-08-01 NOTE — PN
Date of Progress Note:  08/01/2022



Chief Complaint:  Hyperkalemia, fluid overload, end-stage renal disease.  Patient presented to the [facility] because of nausea, vomiting, and diarrhea.  She has multiple medical problems including histor
y of congestive heart failure with diastolic dysfunction, obstructive sleep apnea, lymphedema, atrial
 fibrillation.  The patient received dialysis with daily treatment to control severe hyperkalemia.  S
he was admitted to ICU when she was found to have potassium of 7.5.  Azotemia has improved with dialy
sis and potassium level stabilized.  Potassium level today is within normal limits.  She is due for d
ialysis tomorrow.  Volemia is in better control.  Patient denies fever or chills.



Physical Examination:

Lungs:  Clear to auscultation bilaterally. 

Heart:  S1, S2. 

Abdomen:  Soft. 

Extremities:  Slight edema in both legs.



Impression And Plan:  

1.End-stage renal disease.  Patient will have dialysis tomorrow.

2.Secondary hyperparathyroidism, stable.  Patient is off calcitriol.  Intact PTH was below target ra
nge.

3.Anemia of chronic kidney disease.  Continue LENARD.

4.Hypertension, controlled.  Continue to monitor fluid intake and fluid balance.  Patient is to cont
inue p.o. fluid restriction.

5.Congestive heart failure with exacerbation, acute on chronic with diastolic dysfunction.  Patient 
is undergoing cardiac workup.  The patient will continue low-sodium diet and p.o. fluid restriction. 
 

Plan is to advance ultrafiltration to treat fluid overload.

6.Intradialytic hypotension.  Continue midodrine.





EB/MODL

DD:  08/01/2022 17:01:49Voice ID:  [id number]

DT:  08/01/2022 22:24:59Report ID:  [id number]

## 2022-10-16 ENCOUNTER — HOSPITAL ENCOUNTER (EMERGENCY)
Dept: HOSPITAL 97 - ER | Age: 67
LOS: 1 days | Discharge: HOME | End: 2022-10-17
Payer: COMMERCIAL

## 2022-10-16 DIAGNOSIS — R10.9: Primary | ICD-10-CM

## 2022-10-16 DIAGNOSIS — Z88.8: ICD-10-CM

## 2022-10-16 DIAGNOSIS — N18.6: ICD-10-CM

## 2022-10-16 DIAGNOSIS — E78.5: ICD-10-CM

## 2022-10-16 DIAGNOSIS — Z88.6: ICD-10-CM

## 2022-10-16 DIAGNOSIS — I48.91: ICD-10-CM

## 2022-10-16 DIAGNOSIS — M48.55XA: ICD-10-CM

## 2022-10-16 DIAGNOSIS — Z88.0: ICD-10-CM

## 2022-10-16 DIAGNOSIS — Z99.2: ICD-10-CM

## 2022-10-16 DIAGNOSIS — Z88.1: ICD-10-CM

## 2022-10-16 LAB
ALBUMIN SERPL BCP-MCNC: 3.2 G/DL (ref 3.4–5)
ALP SERPL-CCNC: 149 U/L (ref 45–117)
ALT SERPL W P-5'-P-CCNC: 19 U/L (ref 12–78)
AST SERPL W P-5'-P-CCNC: 18 U/L (ref 15–37)
BUN BLD-MCNC: 24 MG/DL (ref 7–18)
GLUCOSE SERPLBLD-MCNC: 108 MG/DL (ref 74–106)
HCT VFR BLD CALC: 31.9 % (ref 36–45)
LIPASE SERPL-CCNC: 88 U/L (ref 73–393)
LYMPHOCYTES # SPEC AUTO: 1 K/UL (ref 0.7–4.9)
MCV RBC: 104.6 FL (ref 80–100)
PMV BLD: 7.9 FL (ref 7.6–11.3)
POTASSIUM SERPL-SCNC: 4.4 MMOL/L (ref 3.5–5.1)
RBC # BLD: 3.05 M/UL (ref 3.86–4.86)

## 2022-10-16 PROCEDURE — 36415 COLL VENOUS BLD VENIPUNCTURE: CPT

## 2022-10-16 PROCEDURE — 87088 URINE BACTERIA CULTURE: CPT

## 2022-10-16 PROCEDURE — 99284 EMERGENCY DEPT VISIT MOD MDM: CPT

## 2022-10-16 PROCEDURE — 74176 CT ABD & PELVIS W/O CONTRAST: CPT

## 2022-10-16 PROCEDURE — 87086 URINE CULTURE/COLONY COUNT: CPT

## 2022-10-16 PROCEDURE — 81015 MICROSCOPIC EXAM OF URINE: CPT

## 2022-10-16 PROCEDURE — 96374 THER/PROPH/DIAG INJ IV PUSH: CPT

## 2022-10-16 PROCEDURE — 51702 INSERT TEMP BLADDER CATH: CPT

## 2022-10-16 PROCEDURE — 80053 COMPREHEN METABOLIC PANEL: CPT

## 2022-10-16 PROCEDURE — 83690 ASSAY OF LIPASE: CPT

## 2022-10-16 PROCEDURE — 85025 COMPLETE CBC W/AUTO DIFF WBC: CPT

## 2022-10-16 NOTE — XMS REPORT
Continuity of Care Document

                           Created on:2022



Patient:SUZI SEARS

Sex:Female

:1955

External Reference #:[id number]





Demographics







                          Address                   [address]



                                                    [place] [address]

 

                          Home Phone                [phone]

 

                          Work Phone                [phone]

 

                          Mobile Phone              [phone]

 

                          Email Address             [email or web address]

 

                          Preferred Language        English

 

                          Marital Status            Unknown

 

                          Yazidism Affiliation     Unknown

 

                          Race                      Unknown

 

                          Additional Race(s)        Unavailable

 

                          Ethnic Group              Unknown









Author







                          Organization              [facility]

t

 

                          Address                   [address]



                                                    [place] [address]

 

                          Phone                     [phone]









Support







                Name            Relationship    Address         Phone

 

                JEIMY SEARS EDUARDO SP              [address] [phone]

[phone]



                                                [place] [address] 

 

                CHITRA SEARSKEVIN LAMBERT              Unavailable     [phone]

 

                JEIMY SEARS CHITRA SPOU            [address] [phone]



                                                [place] [address] 

 

                JEIMY SEARS SPOU            [address]       [phone]

[phone]



                                                [place] [address] 

 

                CHITRA SEARS    SPOU            [address]       [phone]



                                                [place] [address] 









Care Team Providers







                    Name                Role                Phone

 

                    Clark Cuellar   Attending Clinician Unavailable

 

                    ADRIEL MATIAS     Attending Clinician Unavailable

 

                    DAYLIN LITTLE      Attending Clinician Unavailable

 

                    MARTINA DOUGLASS     Attending Clinician Unavailable

 

                    Venancio Barajas      Attending Clinician Unavailable

 

                    Koko         Attending Clinician Unavailable

 

                    LORRAINE LYON       Attending Clinician Unavailable

 

                    Charisse Green  Attending Clinician [phone]

 

                    ESTELITA LAMAR        Attending Clinician Unavailable

 

                    THU DUVALL       Attending Clinician Unavailable

 

                    MD ROSALIO ANGULO     Attending Clinician Unavailable

 

                    Suzie Dominguez       Attending Clinician Unavailable

 

                    Hadley Medina         Attending Clinician Unavailable

 

                    MELISSA POWER     Attending Clinician Unavailable

 

                    BRENNA JACOBS    Attending Clinician Unavailable

 

                    MD ESTELITA LAMAR Attending Clinician Unavailable

 

                    JULIANNA KEANE  Attending Clinician Unavailable

 

                    MAGY GARCIA     Attending Clinician Unavailable

 

                    MELVIN MANZO   Attending Clinician Unavailable

 

                    MD MELVIN MANZO Attending Clinician Unavailable

 

                    MD DAYLIN LITTLE OBIOMA Attending Clinician Unavailable

 

                    SOLIPURAM, MD MELISSA GOOD R Attending Clinician Unavailable

 

                    Brenna Jacobs    Attending Clinician [phone]

 

                    BRENNA JACOBS    Attending Clinician Unavailable

 

                    Abdi Schulz Attending Clinician [phone]

 

                    Gabriela Rosenthal Attending Clinician [phone]

 

                    EMILIA BONILLA        Attending Clinician Unavailable

 

                    DR GOPAL ADEN    Attending Clinician Unavailable

 

                    Yousif Rhodes Attending Clinician [phone]

 

                    Randy Cunningham   Attending Clinician [phone]

 

                    Yoan Lei     Attending Clinician [phone]

 

                    Abbi Somers  Attending Clinician [phone]

 

                    Vignesh See Attending Clinician [phone]

 

                    Charisse Green  Admitting Clinician Unavailable

 

                    MARTINA DOUGLASS     Admitting Clinician Unavailable

 

                    Anthony Elkins     Admitting Clinician Unavailable

 

                    ElliotB         Admitting Clinician Unavailable

 

                    AKI DUPREE         Admitting Clinician Unavailable

 

                    CHRISTINE MARTINEZ       Admitting Clinician Unavailable

 

                    MD ROSALIO ANGULO     Admitting Clinician Unavailable

 

                    MELISSA POWER     Admitting Clinician Unavailable

 

                    MD ESTELITA LAMAR Admitting Clinician Unavailable

 

                    MELVIN MANZO   Admitting Clinician Unavailable

 

                    MD MELVIN MANZO Admitting Clinician Unavailable

 

                    DAYLIN LITTLE      Admitting Clinician Unavailable

 

                    MD KAUSHIK BECKHAM   Admitting Clinician Unavailable

 

                    MD MELISSA POWER Admitting Clinician Unavailable

 

                    DR GOPAL ADEN    Admitting Clinician Unavailable









Payers







           Payer Name Policy Type Policy Number Effective Date Expiration Date S ource MEDICARE B-[place]:            [id number] 2021            



           [employer]                       00:00:00              







Problems







       Condition Condition Condition Status Onset  Resolution Last   Treating Co

mments 

Source



       Name   Details Category        Date   Date   Treatment Clinician        



                                                 Date                 

 

       SOLO     SOLO   Diagnosis Active 2021               Mem

oria



              Active                         12:03:00               l



              2021               00:00:                             Donny Pinzon                                                    

 

       COLON   COLON Diagnosis Active 2017               Mem

oria



       CANCER CANCER                         05:49:00               l



       SCREENING- SCREENING-               00:00:                             Neel deleon



       Z12.11 Z12.11               00                                 



              Active                                                  



              2017                                                  



              [facility]                                                  



              [facility]                                                    

 

       R92.1 -  R92.1 - Diagnosis Active 2016               

[facility]



       MAMMOGRAPH MAMMOGRAPH               -12          15:55:00               

l



       IC     IC                   00:01:                             Barron



       CALCIFCN CALCIFCN               00                                 



       FOUND ON FOUND ON                                                  



              Active                                                  



              2016                                                  



              [facility]                                                  



              [place]                                                  



                                                                      

 

       Z12.31 -  Z12.31 - Diagnosis Active 2016             

  [facility]



       ENCNTR ENCNTR               -          07:08:00               l



       SCREEN SCREEN               00:01:                             Barron



       MAMMOGRAM MAMMOGRAM               00                                 



       FOR MA FOR MA                                                  



              Active                                                  



              2016                                                  



               [facility]                                                  



              [place]                                                  

 

       RT WRIST  RT WRIST Diagnosis Active 2017             

  [facility]



       DISTAL DISTAL               1-          02:03:00               l



       RADIUS RADIUS               09:00:                             Barron



       CLSD FX CLSD FX               00                                 



              Active                                                  



              2016                                                  



              [facility]                                                     



              [place]                                                  



              Bone &                                                  



              Joint                                                   

 

       Abnormal  Abnormal Problem Active 2022               

[facility]



       glucose glucose               7-02          03:11:55               l



       level  level                00:00:                             Barron



       (finding) (finding)               00                                 



              Active                                                  



              2015                                                  



               Problem                                                  



              2022                                                  



              Data                                                    



              migrated                                                  



              from [facility]                                                  



              on 7/8/15.                                                  



               Medical                                                  



              Group,                                                  



              ILA Zarate,                                                  



              [facility]                                                  



              [place],                                                  



              [facility]                                                     



              [place]                                                  



              Trace,[facility]                                                  



              [place]                                                  



              Bone &                                                  



              Joint,                                                  



              [place]                                                  

 

       Lymphedema        Problem Active 2020               M

emoria



       (disorder) Lymphedema               2-04          00:38:51               

l



              (disorder)               00:00:                             Donny

n



              Active               00                                 



              2014                                                  



              Problem                                                  



              2020                                                  



              Data                                                    



              migrated                                                  



              from [facility]                                                  



              on                                                      



              5/30/15.                                                  



               Medical                                                  



              Group,                                                  



              ILA Zarate,                                                  



              [facility]                                                  



              Land,                                                  



              SMR                                                     



              Regulo                                                  



              Trace,[facility]                                                  



              [place]                                                  



              Bone &                                                  



              Joint,                                                  



              [place]                                                  

 

       Obstructiv  Obstructi Problem Active 2022            

   [facility]



       e sleep ve sleep               2-04          03:11:55               l



       apnea  apnea                00:00:                             [facility]



       syndrome syndrome               00                                 



       (disorder) (disorder)                                                  



               Active                                                  



              2014                                                  



              Problem                                                  



              2022                                                  



              Data                                                    



              migrated                                                  



              from [facility]                                                  



              on                                                      



              5/30/15.                                                  



               Medical                                                  



              Group,                                                  



              OPILIZY Zarate,                                                  



              [facility]                                                  



              Land,                                                  



              S[facility]                                                     



              Regulo                                                  



              Trace,[facility]                                                  



              [place]                                                  



              [facility]one &                                                  



              Joint,                                                  



              [place]                                                  

 

       Hypothyroi  Hypothyro Problem Active 2013-0        2019-10-19            

   [facility]



       dism   idism                4-24          21:25:36               l



       (disorder) (disorder)               00:00:                             He

rmann



              Active               00                                 



              2013                                                  



              Problem                                                  



              10/19/2019                                                  



              Data                                                    



              migrated                                                  



              from [facility]                                                  



              on                                                      



              5/30/15.                                                  



               Medical                                                  



              Group,                                                  



              [facility]                                                    



              Tessa,                                                  



              [facility]                                                  



              Land,                                                  



              [facility]                                                     



              [facility]                                                  



              Trace,[facility]                                                  



              [place]                                                  



              [facility]one &amp;                                                  



              Joint,                                                  



              [place]                                                  

 

       Depressive  Depressiv Problem Active 2022            

   [facility]



       disorder e disorder               4-24          03:11:55               l



       (disorder) (disorder)               00:00:                             He

rmann



              Active               00                                 



              2013                                                  



              Problem                                                  



              2022                                                  



              Data                                                    



              migrated                                                  



              from [facility]                                                  



              on                                                      



              5/30/15.                                                  



               Medical                                                  



              Group,                                                  



              [facility]                                                    



              Tessa,                                                  



              [facility]                                                  



              Land,                                                  



              S[facility]                                                     



              [facility]                                                  



              Trace,[facility]                                                  



              [place]                                                  



              [facility]one &                                                  



              Joint,                                                  



              [place]                                                  

 

       Obesity  Obesity Problem Active 2016               Me

moria



       (disorder) (disorder)               8-20          00:23:22               

l



              Active               00:00:                             Barron



              2012               00                                 



               Problem                                                  



              2016                                                  



              Data                                                    



              migrated                                                  



              from [facility]                                                  



              on                                                      



              5/30/15.                                                  



               [facility]                                                  



              Tessa,                                                  



              [facility]                                                  



              Land,                                                  



              SMR                                                     



              [facility]                                                  



              Trace,[facility]                                                  



              [place]                                                  



              Bone &                                                  



              Joint                                                   

 

       Cobalamin  Cobalamin Problem Active 2022             

  [facility]



       deficiency deficiency               7-11          03:11:55               

l



       (disorder) (disorder)               00:00:                             He

rmann



              Active               00                                 



              2012                                                  



              Problem                                                  



              2022                                                  



              Data                                                    



              migrated                                                  



              from [facility]                                                  



              on                                                      



              5/30/15.                                                  



               Medical                                                  



              Group,                                                  



              [facility]                                                    



              Tessa,                                                  



              [facility]                                                  



              Land,                                                  



              S[facility]                                                     



              Regulo                                                  



              Trace,[facility]                                                  



              [place]                                                  



              [facility]one &                                                  



              Joint,                                                  



              [place]                                                  

 

       Hypertensi  Hypertens Problem Active 2016            

   [facility]



       ve episode kyle                  7-10          00:23:22               l



       (disorder) episode               00:00:                             Meredith

nn



              (disorder)               00                                 



              Active                                                  



              07/10/2012                                                  



              Problem                                                  



              2016                                                  



              Data                                                    



              migrated                                                  



              from [facility]                                                  



              on                                                      



              5/30/15.                                                  



               [facility]                                                  



              Tessa,                                                  



              [facility]                                                  



              [place],                                                  



              S[facility]                                                     



              [facility]                                                  



              Trace,[facility]                                                  



              [place]                                                  



              Bone &                                                  



              Joint                                                   

 

       Pain in   Pain in Problem                      2016               [facility]

[facility]



       wrist  wrist                              05:02:18               l



       (finding) (finding)                                                  [facility]

daryl



              Problem                                                  



              2016                                                  



              [facility]                                                  



              [facility]                                                  



              [facility]                                                  



              [facility] [place]                                                  



              [place]                                                    

 

       Calcificat  Calcifica Problem Resolve               2022           

    [facility]



       ion of tion of        d                    03:11:55               l



       breast breast                                                  [place]



       (finding) (finding)                                                  



              Resolved                                                  



              Problem                                                  



              2022                                                  



              Left                                                   



              Medical                                                  



              Group,                                                  



              [facility]                                                    



              Tessa,                                                  



              [facility] [place]                                                  



              [place],                                                  



              [facility]                                                     



              [place]                                                  



              [facility]                                                  



              [place]                                                  



              [facility]one [facility]                                                  



              Joint,                                                  



              [place]                                                  

 

       Dysuria   Dysuria Problem Resolve               2022               

[facility]



       (finding) (finding)        d                    03:11:55               l



              Resolved                                                  [place]



              Problem                                                  



              2022                                                  



                                                                    



              Medical                                                  



              Group,                                                  



              [facility] [place]                                                  



              [place],                                                  



              [place]                                                  

 

       Acute   Acute Problem Active               2020               [facility]

ia



       urinary urinary                             22:36:35               l



       tract  tract                                                   [place]



       infection infection                                                  



       (disorder) (disorder)                                                  



              Active                                                  



              Problem                                                  



              2020                                                  



                                                                    



              Medical                                                  



              Group                                                   

 

       Chronic  Chronic Problem Active               2020               Me

moria



       renal  renal                              22:36:35               l



       impairment impairment                                                  He

rmann



       (disorder) (disorder)                                                  



               Active                                                  



              Problem                                                  



              2020                                                  



               Medical                                                  



              Group,                                                  



              OPILIZY Zarate,                                                  



              [facility] [place]                                                  



              [place],[facility]                                                     



              [place]                                                  



              [facility]                                                  



              [place]                                                  



              [facility]one [facility]                                                  



              Joint,                                                  



              [place]                                                  

 

       Diabetes   Diabetes Problem Active               2020              

 [facility]



       mellitus mellitus                             23:27:54               l



       (disorder) (disorder)                                                  He

rmann



              Active                                                  



              Problem                                                  



              2020                                                  



                                                                    



              Medical                                                  



              Group,                                                  



              [facility] [place]                                                  



              [place]                                                    

 

       Urinalysis  Urinalysi Problem Active               2022            

   [facility]



       = abnormal s =                                03:11:55               l



       (finding) abnormal                                                  [place]

nn



              (finding)                                                  



              Active                                                  



              Problem                                                  



              2022                                                  



               Medical                                                  



              Group,                                                  



              [facility] [place]                                                  



              [place],                                                  



              [place]                                                  

 

       Atrial  Atrial Problem Active               2022               [place]

milan



       fibrillati fibrillati                             03:11:55               

l



       on     on                                                      [place]



       (disorder) (disorder)                                                  



               Active                                                  



              Problem                                                  



              2022                                                  



               Medical                                                  



              Group,                                                  



              [facility] [place]                                                  



              [place],                                                  



              [place]                                                  

 

       Benign  Benign Problem Active               2022               [place]

milan



       essential essential                             03:11:55               l



       hypertensi hypertensi                                                  He

rmann



       on     on                                                      



       (disorder) (disorder)                                                  



              Active                                                  



              Problem                                                  



              2022                                                  



               Medical                                                  



              Group,                                                  



              [facility]                                                    



              Tessa,                                                  



              [facility] [place]                                                  



              [place],                                                  



              [facility]                                                     



              [facility]                                                  



              [facility]                                                  



              [place]                                                  



              [facility]one [facility]                                                  



              Joint,                                                  



              [place]                                                  

 

       Cardiorena        Problem Active               2022               M

[facility]oria



       l syndrome Cardiorena                             03:11:55               

l



       (disorder) l syndrome                                                  He

rmann



              (disorder)                                                  



              Active                                                  



              Problem                                                  



              2022                                                  



                                                                    



              Medical                                                  



              Group,                                                  



              [facility] [place]                                                  



              [place],                                                  



              [place]                                                  

 

       Chronic   Chronic Problem Active               2022               M

[facility]



       kidney kidney                             03:11:55               l



       disease disease                                                  [place]



       (disorder) (disorder)                                                  



              Active                                                  



              Problem                                                  



              2022                                                  



                                                                    



              Medical                                                  



              Group,                                                  



              [facility] [place]                                                  



              [place],                                                  



              [place]                                                  

 

       Chronic   Chronic Problem Active               2022               M

[facility]oria



       kidney kidney                             03:11:55               l



       disease disease                                                  [place]



       stage 3 stage 3                                                  



       (disorder) (disorder)                                                  



              Active                                                  



              Problem                                                  



              2022                                                  



                                                                    



              Medical                                                  



              Group,                                                  



              [facility] [place]                                                  



              [place],                                                  



              [place]                                                  

 

       Chronic  Chronic Problem Active               2022               Me

moria



       pain   pain                               03:11:55               l



       (finding) (finding)                                                  [facility]

daryl



              Active                                                  



              Problem                                                  



              2022                                                  



               Medical                                                  



              Group,                                                  



              [facility] [place]                                                  



              [place],                                                  



              [place]                                                  

 

       Dependence  Dependenc Problem Active               2022            

   [facility]



       on     e on                               03:11:55               l



       supplement supplement                                                  He

adrienne



       al oxygen al oxygen                                                  



       (finding) (finding)                                                  



              Active                                                  



              Problem                                                  



              2022                                                  



               Medical                                                  



              Group,                                                  



              [place]                                                  

 

       Edema of  Edema of Problem Active               2022               

[facility]



       lower  lower                              03:11:55               l



       extremity extremity                                                  [facility]

daryl



       (finding) (finding)                                                  



              Active                                                  



              Problem                                                  



              2022                                                  



               Medical                                                  



              Group,                                                  



              [facility] [place]                                                  



              [place],                                                  



              [place]                                                  

 

       Hyperlipid  Hyperlipi Problem Active               2022            

   [facility]



       emia   demia                              03:11:55               l



       (disorder) (disorder)                                                  He

rmann



              Active                                                  



              Problem                                                  



              2022                                                  



              Data                                                    



              migrated                                                  



              from [facility]                                                  



              on                                                      



              5/30/15.                                                  



               Medical                                                  



              Group,                                                  



              [facility]                                                    



              Tessa,                                                  



              [facility] [place]                                                  



              [place],                                                  



              [facility]                                                     



              [place]                                                  



              [facility]                                                  



              [place]                                                  



              [facility]                                                  



              Joint,                                                  



              [place]                                                  

 

       Hyperparat  Hyperpara Problem Active               2022            

   [facility]



       hyroidism thyroidism                             03:11:55               l



       (disorder) (disorder)                                                  He

rmann



              Active                                                  



              Problem                                                  



              2022                                                  



                                                                    



              Medical                                                  



              Group,                                                  



              [facility] [place]                                                  



              [place],                                                  



              [place]                                                  

 

       Hypertensi  Hypertens Problem Active               2022            

   [facility]



       ve     kyle                                03:11:55               l



       disorder, disorder,                                                  [facility]

daryl



       systemic systemic                                                  



       arterial arterial                                                  



       (disorder) (disorder)                                                  



              Active                                                  



              Problem                                                  



              2022                                                  



               Medical                                                  



              Group,[facility]                                                  



              ical                                                    



              Specialty                                                  



              Hospital                                                  



              [facility] [place]                                                  



              [place],                                                  



              [facility] [place]                                                  



              [place],                                                  



              [place]                                                  

 

       Hypertensi  Hypertens Problem Active               2022            

   [facility]



       ve renal kyle renal                             03:11:55               l



       disease disease                                                  [place]



       (disorder) (disorder)                                                  



              Active                                                  



              Problem                                                  



              2022                                                  



               Medical                                                  



              Group,                                                  



              [facility] [place]                                                  



              [place],                                                  



              [place]                                                  

 

       Hyperurice  Hyperuric Problem Active               2022            

   [facility]



       keegan    emia                               03:11:55               l



       (disorder) (disorder)                                                  He

rmann



              Active                                                  



              Problem                                                  



              2022                                                  



              Data                                                    



              migrated                                                  



              from [facility]                                                  



              on                                                      



              5/30/15.                                                  



               Medical                                                  



              Group,                                                  



              [facility]                                                    



              [facility],                                                  



              [facility] [place]                                                  



              [facility],                                                  



              [facility]                                                     



              [place]                                                  



              [facility]                                                  



              [place]                                                  



              [facility]                                                  



              Joint,                                                  



              [place]                                                  

 

       Lymphedema  Lymphedem Problem Active               2022            

   [facility]



       of lower a of lower                             03:11:55               l



       extremity extremity                                                  [facility]

daryl



       (disorder) (disorder)                                                  



              Active                                                  



              Problem                                                  



              2022                                                  



               Medical                                                  



              Group,                                                  



              [facility] [place]                                                  



              [place],                                                  



              [place]                                                  

 

       Malignant  Malignant Problem Active               2022             

  [facility]



       neoplasm neoplasm                             03:11:55               l



       of skin of of skin of                                                  He

rmann



       upper limb upper limb                                                  



       (disorder) (disorder)                                                  



              Active                                                  



              Problem                                                  



              2022                                                  



                                                                    



              Medical                                                  



              Group,                                                  



              [facility]                                                  



              [place],                                                  



              [place]                                                  

 

       Morbid  Morbid Problem Active               2022               Hakeem

milan



       obesity obesity                             03:11:55               l



       (disorder) (disorder)                                                  He

rmann



              Active                                                  



              Problem                                                  



              2022                                                  



               Medical                                                  



              Group,                                                  



              [facility]                                                    



              Tessa,                                                  



              [facility]                                                  



              [place],                                                  



              [facility]                                                     



              [place]                                                  



              [facility]                                                  



              [place]                                                  



              [facility]                                                  



              Joint,                                                  



              [place]                                                  

 

       Post-disch  Post-disc Problem Active               2022            

   [facility]



       arge   harge                              03:11:55               l



       follow-up follow-up                                                  Abraham brito



       (finding) (finding)                                                  



              Active                                                  



              Problem                                                  



              2022                                                  



               Medical                                                  



              Group,                                                  



              [place]                                                  

 

       Prerenal   Prerenal Problem Active               2022              

 [facility]



       azotemia azotemia                             03:11:55               l



       (disorder) (disorder)                                                  He

rmann



              Active                                                  



              Problem                                                  



              2022                                                  



                                                                    



              Medical                                                  



              Group,                                                  



              [facility]                                                  



              [place],                                                  



              [place]                                                  

 

       Proteinuri  Proteinur Problem Active               2022            

   [facility]



       a      ia                                 03:11:55               l



       (finding) (finding)                                                  Abraham brito



              Active                                                  



              Problem                                                  



              2022                                                  



               Medical                                                  



              Group,                                                  



              [facility]                                                  



              [place],                                                  



              [place]                                                  

 

       Stasis  Stasis Problem Active               2022               [place]

milan



       ulcer  ulcer                              03:11:55               l



       (disorder) (disorder)                                                  He

rmann



              Active                                                  



              Problem                                                  



              2022                                                  



               Medical                                                  



              Group,                                                  



              [facility]                                                  



              [place],                                                  



              [place]                                                  



                                                                      

 

       Swelling -  Swelling Problem Active               2022             

  [facility]



       edema - - edema -                             03:11:55               l



       symptom symptom                                                  [facility]



       (finding) (finding)                                                  



              Active                                                  



              Problem                                                  



              2022                                                  



               Medical                                                  



              Group,                                                  



              [facility]                                                  



              [place],                                                  



              [place]                                                  



                                                                      

 

       Swollen  Swollen Problem Active               2022               Me

moria



       ankle  ankle                              03:11:55               l



       (finding) (finding)                                                  Herm

daryl



              Active                                                  



              Problem                                                  



              2022                                                  



               Medical                                                  



              Group,                                                  



              [facility]                                                  



              [place],                                                  



              [place]                                                  

 

       Urinary  Urinary Problem Resolve               2022               M

emoria



       tract  tract         d                    03:11:55               l



       infectious infectious                                                  He

rmann



       disease disease                                                  



       (disorder) (disorder)                                                  



              Resolved                                                  



              Problem                                                  



              2022                                                  



               Medical                                                  



              Group,                                                  



              [facility]                                                  



              [place],                                                  



              [place]                                                  

 

       Venous  Venous Problem Active               2022               [place]

milan



       ulcer of ulcer of                             03:11:55               l



       leg    leg                                                     [place]



       (disorder) (disorder)                                                  



              Active                                                  



              Problem                                                  



              2022                                                  



               Medical                                                  



              Group,                                                  



              [facility]                                                  



              [place],                                                  



              [place]                                                  

 

       Weight  Weight Problem Active               2022               [place]

milan



       gain   gain                               03:11:55               l



       finding finding                                                  [place]



       (finding) (finding)                                                  



              Active                                                  



              Problem                                                  



              2022                                                  



               Medical                                                  



              Group,                                                  



              [facility]                                                  



              [place],                                                  



              [place]                                                  

 

       Acute   Acute Problem Active               2016               Memor

ia



       renal  renal                              00:23:22               l



       failure failure                                                  [facility]



       syndrome syndrome                                                  



       (disorder) (disorder)                                                  



              Active                                                  



              Problem                                                  



              2016                                                  



              Data                                                    



              migrated                                                  



              from                                                   



              [facility] [employer]                                                  



              on                                                      



              5/30/15.                                                  



               ILA Zarate,                                                  



              [facility]                                                  



              [place],[facility]                                                     



              [place]                                                  



              [facility]                                                  



              [facility]                                                  



              [facility]                                                   

 

       Kidney  Kidney Problem Active               2017               [place]

milan



       disease disease                             03:07:28               l



       (disorder) (disorder)                                                  He

rmann



               Active                                                  



              Problem                                                  



              2017                                                  



               [facility]                                                  



              [facility]                                                    

 

       Migraine  Migraine Problem Active               2017               

[facility]



       (disorder) (disorder)                             03:07:28               

l



              Active                                                  [place]



              Problem                                                  



              2017                                                  



              [facility]                                                  



              [facility]                                                  



              [facility]                                                  



              [facility] [place],                                                  



              [place]                                                  

 

       RIGHT   RIGHT Diagnosis Active               2016               Mem

oria



       WRIST  WRIST                              15:06:00               l



       DISTAL DISTAL                                                  [place]



       RADIUS RADIUS                                                  



       CLSD FX CLSD FX                                                  



              Active                                                  



              [facility]                                                     



              [place]                                                  



              [facility]one &                                                  



              Joint                                                   

 

       DISTAL   DISTAL Diagnosis Active               2016               M

emoria



       RADIUS RADIUS                             09:46:00               l



       CLSD FX CLSD FX                                                  [place]



              Active [facility]                                                     



              [place] [facility]                                                   

 

       Long-term  Long-term Problem Active               2022             

  [facility]



       current current                             03:11:55               l



       use of use of                                                  [facility]



       drug   drug                                                    



       therapy therapy                                                  



       (situation (situation                                                  



       )      ) Active                                                  



              Problem                                                  



              2022                                                  



               Medical                                                  



              Group                                                   

 

       Cholestero  Cholester Problem                      2016            

   [facility]



       l      ol                                 05:02:18               l



       (substance (substance                                                  He

rmann



       )      )  Problem                                                  



              2016                                                  



              Surgical                                                  



              Specialty                                                  



              [facility]                                                    

 

       Sleep   Sleep Problem                      2016               Memor

ia



       apnea  apnea                              05:02:18               l



       (finding) (finding)                                                  Herm

daryl



              Problem                                                  



              2016                                                  



              <sup>2</matos                                                  



              p>does not                                                  



              use cpap                                                  



              Surgical                                                  



              [facility]                                                    

 

       Acute   Acute Problem Resolve 2016               

[facility]



       otitis otitis        d         00:23:22 00:23:22               l



       media  media                00:00:                             [place]



       (disorder) (disorder)               00                                 



              Resolved                                                  



              2013                                                  



              Problem                                                  



              2016                                                  



              Data                                                    



              migrated                                                  



              from                                                   



              [facility]                                                  



              on                                                      



              7/18/15.                                                  



               ILA Zarate,                                                  



              [facility]                                                  



              [place],[facility]                                                     



              [place]                                                  



              Trace,[facility]                                                  



              Bone &                                                  



              Joint                                                   







History of Past Illness







       Condition Condition Condition Status Onset  Resolution Last   Treating Co

mments 

Source



       Name   Details Category        Date   Date   Treatment Clinician        



                                                 Date                 

 

       -nCoV  2019-nCoV Problem        2022         

      [facility]



       acute  acute                   03:11:55 03:11:55               l



       respirator respirator               21:13:                             He

rmann



       y disease y disease               00                                 



              2022                                                  



               Medical                                                  



              Group                                                   

 

       Bronchitis  Bronchiti Problem        2022        

       [facility]



       , not  s, not                  03:11:55 03:11:55               l



       specified specified               21:13:                             Herm

daryl



       as acute as acute               00                                 



       or chronic or chronic                                                  



              2022                                                  



               Medical                                                  



              Group                                                   

 

       Other   Other Problem        2021               M

emoria



       abnormal abnormal                  01:18:13 01:18:13               l



       glucose glucose               21:47:                             Barron



              2021               00                                 



              2021                                                  



               Medical                                                  



              Group                                                   

 

       Other long  Other Problem        2021            

   [facility]



       term   long term                  01:18:13 01:18:13               l



       (current) (current)               21:46:                             Abraham brito



       drug   drug                 00                                 



       therapy therapy                                                  



              2021                                                  



               Medical                                                  



              Group                                                   

 

       Other   Other Problem        2021               M

emoria



       hyperlipid hyperlipid                  01:18:13 01:18:13             

  l



       emia   emia                 21:45:                             [facility]ron



              2021               00                                 



              2021                                                  



               Medical                                                  



              Group                                                   

 

       Essential  Essential Problem        2021         

      [facility]



       (primary) (primary)                  01:18:13 01:18:13               

l



       hypertensi hypertensi               21:44:                             Neel deleon



       on     on                   2021                                                  



               Medical                                                  



              Group                                                   

 

       Muscle  Muscle Problem        2021               

[facility]



       spasm of spasm of                  01:18:13 01:18:13               l



       back   back                 21:39:                             [facility]



              2021               00                                 



              2021                                                  



              [facility]                                                  



              Group                                                   

 

       Low back  Low back Problem        2021           

    [facility]



       pain,  pain,                   01:18:13 01:18:13               l



       unspecifie unspecifie               21:38:                             Neel deleon



       d      d                    00                                 



              2021                                                  



              [facility]                                                  



              Group                                                   

 

       Dependence  Dependenc Problem        2021        

       [facility]



       on     e on                    01:29:16 01:29:16               l



       supplement supplement               21:17:                             Neel deleon



       al oxygen al oxygen               00                                 



              2021                                                  



              [facility]                                                  



              Group                                                   

 

       Unsteadine  Unsteadin Problem        2021        

       [facility]



       ss on feet ess on                  01:29:16 01:29:16               l



              feet                 21:13:                             [facility]



              2021               00                                 



              2021                                                  



               Medical                                                  



              Group                                                   

 

       Weakness  Weakness Problem        2021           

    [facility]



              2021   01:29:16 01:29:16               l



              2021               21:13:                             Donny martinez



               Medical               00                                 



              Group                                                   







Allergies, Adverse Reactions, Alerts







       Allergy Allergy Status Severity Reaction(s) Onset  Inactive Treating Comm

ents 

Source



       Name   Type                        Date   Date   Clinician        

 

       codeine DA     Active U      UNKN                         [facility]



                                          5-13                        Clear



                                          00:00:                      [place]



                                                                    [facility]

 

       sulfamet DA     Active U      UNKN                         [facility]



       hoxazole                             -                        Clear



                                          00:00:                      [place]



                                                                    [facility]

 

       trimetho DA     Active U      UNKN                         [facility]



       prim                               -                        Clear



                                          00:00:                      [place]



                                                                    [facility]

 

       P[place]ill DA     Active U      AS AN INFANT                       [facility]

A



       ins                                                        Clear



                                          00:00:                      [place]



                                                                    [facility]

 

       cefepime DA     Active U      RASH                         [facility]



                                          5-06                        Clear



                                          00:00:                      [place]



                                                                    [facility]

 

       levoflox DA     Active U      RASH                         [facility]



       acin                               5-06                        Clear



                                          00:00:                      [place]



                                                                    [facility]

 

       penicill penicill Active                                           [facility]

a



       ins<sup> ins<sup>                                                  l



       1</sup> 1</sup>                                                  [facility]

 

       codeine< codeine< Active                                           [facility]

a



       sup>2</s sup>2</s                                                  l



       up>    up>                                                     [facility]

 

       cefepime cefepime Active                                           [facility]

a



                                                                      l



                                                                      [place]

 

       Levaquin Levaquin Active                                           [facility]

a



                                                                      l



                                                                      [facility]

 

       penicill penicill Active                                           [facility]

a



       ins<sup> ins<sup>                                                  l



       2</sup> 2</sup>                                                  [facility]

 

       codeine codeine Active                                           [facility]



                                                                      l



                                                                      [facility]

 

       Bactrim Bactrim Active                                           [facility]



                                                                      l



                                                                      [facility]

 

       penicill penicill Active                                           [facility]

a



       ins    ins                                                     l



                                                                      [facility]







Social History







           Social Habit Start Date Stop Date  Quantity   Comments   Source

 

           Social History 2020                       Doreen nguyen



                      15:59:50   15:59:50                         









                Smoking Status  Start Date      Stop Date       Source

 

                Social History                                  [facility]







Medications







       Ordered Filled Start  Stop   Current Ordering Indication Dosage Frequency

 Signature

                    Comments            Components          Source



     Medication Medication Date Date Medication? Clinician                (SIG) 

          



     Name Name                                                   

 

     Zithromax      2022-0      Yes                      See            [facility]



     Z-Mata 250      6-29                               Instruct[facility]           l



     mg oral      21:14:                               ns, Take 2           [facility]

d[facility]



     tablet      00                                 tablets by           



                                                  mouth the           



                                                  first day           



                                                  then 1           



                                                  tablet by           



                                                  mouth days           



                                                  2-5. (Pt           



                                                  is on           



                                                  hemodialys           



                                                  is)., X 5           



                                                  day, # 6           



                                                  tab, 0           



                                                  Refill(s),           



                                                  Pharmacy:           



                                                  [facility],           



                                                  [id number],           



                                                  cm,            



                                                  22           



                                                  14:52:00           



                                                  CDT,           



                                                  Height,           



                                                  106.818,           



                                                  kg,            



                                                  22           



                                                  14...           

 

     Zith[facility]      2-0      Yes                      [facility]                               [facility]           l



     mg oral      21:14:                               ns, Take 2           [facility]

d[facility]



     tablet      00                                 tablets by           



                                                  mouth the           



                                                  first day           



                                                  then 1           



                                                  tablet by           



                                                  mouth days           



                                                  2-5. (Pt           



                                                  is on           



                                                  hemodialys           



                                                  is)., X 5           



                                                  day, # 6           



                                                  tab, 0           



                                                  Refill(s),           



                                                  Pharmacy:           



                                                  [facility],           



                                                  [id number],           



                                                  cm,            



                                                  22           



                                                  14:52:00           



                                                  CDT,           



                                                  Height,           



                                                  106.818,           



                                                  kg,            



                                                  22           



                                                  14...           

 

     Velphoro      2-0      Yes                      500 mg,           [facility]

a



               6-29                               CHEW,           l



               20:13:                               Daily,           [facility]



               00                                 take with           



                                                  food, 0           



                                                  Refill(s)           

 

     Velphoro      2-0      Yes                      500 mg,           [facility]

a



               6-29                               CHEW,           l



               20:13:                               Daily,           [place]



               00                                 take with           



                                                  food, 0           



                                                  Refill(s)           

 

     sevelamer      2-0      Yes                      2,400 mg =           [facility]

[facility]



     carbonate      6-29                               3 tab, PO,           l



     800 mg oral      20:12:                               TID-Meals,           

[place]



     tablet      00                                 # 270 tab,           



                                                  0              



                                                  Refill(s)           

 

     sevelamer      2-0      Yes                      2,400 mg =           [facility]

[facility]



     carbonate      6-29                               3 tab, PO,           l



     800 mg oral      20:12:                               TID-Meals,           

[facility]



     tablet      00                                 # 270 tab,           



                                                  0              



                                                  Refill(s)           

 

     pantoprazol      2-0      Yes                      40 mg = 1           [facility]

[facility]



     e 40 mg      6-29                               tab, PO,           l



     oral      20:11:                               Daily, 0           [facility]



     enteric      00                                 Refill(s)           



     coated                                                        



     tablet                                                        

 

     pantoprazol      2-0      Yes                      40 mg = 1           [facility]

[facility]



     e 40 mg      6-29                               tab, PO,           l



     oral      20:11:                               Daily, 0           [facility]



     enteric      00                                 Refill(s)           



     coated                                                        



     tablet                                                        

 

     oxyCODONE 5      0      Yes                      5 mg = 1           [facility]

[facility]



     mg oral      6-29                               tab, PO,           l



     tablet,      20:10:                               Q4H, PRN           [facility]

n



     immediate      00                                 Pain, prn,           



     release                                         0              



                                                  Refill(s)           

 

     oxyCODONE 5      0      Yes                      5 mg = 1           [facility]

[facility]



     mg oral      6-29                               tab, PO,           l



     tablet,      20:10:                               Q4H, PRN           [facility]

n



     immediate      00                                 Pain, prn,           



     release                                         0              



                                                  Refill(s)           

 

     ondansetron      0      Yes                      4 mg = 1           [facility]

[facility]



     4 mg oral      6-29                               tab, PO,           l



     tablet,      20:09:                               TID, PRN           [facility]

n



     disintegrat      00                                 Nausea /           



     ing                                          Vomiting,           



                                                  Dissolve           



                                                  tab under           



                                                  tongue, 0           



                                                  Refill(s)           

 

     ondansetron      -0      Yes                      4 mg = 1           [facility]

[facility]



     4 mg oral      6-29                               tab, PO,           l



     tablet,      20:09:                               TID, PRN           [facility]

n



     disintegrat      00                                 Nausea /           



     ing                                          Vomiting,           



                                                  Dissolve           



                                                  tab under           



                                                  tongue, 0           



                                                  Refill(s)           

 

     Nitazoxanid      -0      Yes                      500 mg = 1           

[facility]



     e 500 mg      6-29                               tab, PO,           l



     oral tablet      20:08:                               Q12H, 0           [facility]

[facility]



               00                                 Refill(s)           

 

     Nitazoxanid      -0      Yes                      500 mg = 1           

[facility]



     e 500 mg      6-29                               tab, PO,           l



     oral tablet      20:08:                               Q12H, 0           [facility]

[facility]



               00                                 Refill(s)           

 

     metoprolol      -0      Yes                      25 mg = 1           [facility]

[facility]



     tartrate 25      6-29                               tab, PO,           l



     mg oral      20:07:                               BID, 0           [facility]



     tablet      00                                 Refill(s)           

 

     metoprolol      -0      Yes                      25 mg = 1           [facility]

[facility]



     tartrate 25      6-29                               tab, PO,           l



     mg oral      20:07:                               BID, 0           [facility]



     tablet      00                                 Refill(s)           

 

     methocarbam      -0      Yes                      250 mg =           [facility]

[facility]



     ol 500 mg      6-29                               0.5 tab,           l



     oral tablet      20:05:                               PO, TID, 0           

[facility]



               00                                 Refill(s)           

 

     methocarbam      2-0      Yes                      250 mg =           [facility]

[facility]



     ol 500 mg      6-29                               0.5 tab,           l



     oral tablet      20:05:                               PO, TID, 0           

[facility]



               00                                 Refill(s)           

 

     Alphagan P      -0      Yes                      1 drp,           [facility]

ia



     0.1%      6-29                               OPTH, Q12H           l



     ophthalmic      20:04:                                              [facility]



     solution      00                                                

 

     folic acid      -0      Yes                      1 mg, PO,           [facility]

[facility]



               6-29                               Daily, 0           l



               20:04:                               Refill(s)                                                           

 

     Alphagan P      -0      Yes                      1 drp,           [facility]

[facility]



     0.1%      6-29                               OPTH, Q12H           l



     ophthalmic      20:04:                                              [facility]



     solution      00                                                

 

     folic acid      -0      Yes                      1 mg, PO,           [facility]

[facility]



               6-29                               Daily, 0           l



               20:04:                               Refill(s)           [facility]                                                

 

     Oxygen      -0      Yes                      1 btl,           [facility]



               6-29                               MISC,           l



               20:03:                               Daily, 2           [facility] [place]



               00                                 liters, 0           



                                                  Refill(s)           

 

     Oxygen      -0      Yes                      1 btl,           [facility]



               6-29                               MISC,           l



               20:03:                               Daily, 2           [facility]



               00                                 liters, 0           



                                                  Refill(s)           

 

     Eliquis 2.5      -0      Yes                      2.5 mg,           [facility]

[facility]



     mg oral      6-29                               PO, Q12H,           l



     tablet      20:02:                               tab, 0           [facility] [place]



               00                                 Refill(s)           

 

     Eliquis 2.5      -0      Yes                      2.5 mg,           [facility]

[facility]



     mg oral      6-29                               PO, Q12H,           l



     tablet      20:02:                               tab, 0           [facility]



               00                                 Refill(s)           

 

     tizanidine      -0      Yes                      4 mg = 1           [facility]

[facility]



     4 mg oral      5-16                               tab, PO,           l



     tablet      18:09:                               Bedtime,           [facility] [place]



               00                                 PRN AS           



                                                  NEEDED FOR           



                                                  MUSCLE           



                                                  SPASM, #           



                                                  15 tab, 0           



                                                  Refill(s),           



                                                  Pharmacy:           



                                                  [facility]           



                                                  #[id number],           



                                                  165.1, cm,           



                                                  21           



                                                  15:23:00           



                                                  CST,           



                                                  Height,           



                                                  107.301,           



                                                  kg,            



                                                  21           



                                                  15:23:00           



                                                  CST,           



                                                  Weight           

 

     tizanidine      -0      Yes                      4 mg = 1           [facility]

[facility]



     4 mg oral      5-16                               tab, PO,           l



     tablet      18:09:                               Bedtime,           [facility]



               00                                 PRN AS           



                                                  NEEDED FOR           



                                                  MUSCLE           



                                                  SPASM, #           



                                                  15 tab, 0           



                                                  Refill(s),           



                                                  Pharmacy:           



                                                  [facility]           



                                                  #[id number],           



                                                  165.1, cm,           



                                                  21           



                                                  15:23:00           



                                                  CST,           



                                                  Height,           



                                                  107.301,           



                                                  kg,            



                                                  21           



                                                  15:23:00           



                                                  CST,           



                                                  Weight           

 

     Ondansetron      2-0      Yes                      4 mg = 1           [facility]

[facility]



     4 MG Oral      1-07                               tab, PO,           l



     Tablet      22:01:                               BID, X 5           [facility]



     [Zofran]      00                                 day, # 10           



                                                  tab, 0           



                                                  Refill(s),           



                                                  Pharmacy:           



                                                  [facility]           



                                                  [facility]           



                                                  #[id number],           



                                                  165.1, cm,           



                                                  21           



                                                  15:23:00           



                                                  CST,           



                                                  Height,           



                                                  107.301,           



                                                  kg,            



                                                  21           



                                                  15:23:00           



                                                  CST,           



                                                  Weight           

 

     Ondansetron      -0      Yes                      4 mg = 1           [facility]

[facility]



     4 MG Oral      1-07                               tab, PO,           l



     Tablet      22:01:                               BID, X 5           [facility]



     [Zofran]      00                                 day, # 10           



                                                  tab, 0           



                                                  Refill(s),           



                                                  Pharmacy:           



                                                  [facility]           



                                                  [facility]           



                                                  #[id number],           



                                                  165.1, cm,           



                                                  21           



                                                  15:23:00           



                                                  CST,           



                                                  Height,           



                                                  107.301,           



                                                  kg,            



                                                  21           



                                                  15:23:00           



                                                  CST,           



                                                  Weight           

 

     atorvastati      2021      Yes                      10 mg = 1           [facility]

[facility]



     n 10 mg      2-27                               tab, PO,           l



     oral tablet      21:45:                               Bedtime, #           

[place]



               00                                 90 tab, 0           



                                                  Refill(s),           



                                                  Pharmacy:           



                                                  [facility]           



                                                  [facility]           



                                                  #[id number],           



                                                  165.1, cm,           



                                                  21           



                                                  15:23:00           



                                                  CST,           



                                                  Height,           



                                                  107.301,           



                                                  kg,            



                                                  21           



                                                  15:23:00           



                                                  CST,           



                                                  Weight           

 

     atorvastati      2021      Yes                      10 mg = 1           M

[facility]



     n 10 mg      2-27                               tab, PO,           l



     oral tablet      21:45:                               Bedtime, #           

[place]



               00                                 90 tab, 0           



                                                  Refill(s),           



                                                  Pharmacy:           



                                                  [facility]           



                                                  [facility]           



                                                  #[id number],           



                                                  165.1, cm,           



                                                  21           



                                                  15:23:00           



                                                  CST,           



                                                  Height,           



                                                  107.301,           



                                                  kg,            



                                                  21           



                                                  15:23:00           



                                                  CST,           



                                                  Weight           

 

     tizanidine      2021      Yes                      4 mg = 1           [facility]

[facility]



     4 mg oral      2-27                               tab, PO,           l



     tablet      21:40:                               Bedtime,           [place]



               00                                 PRN for           



                                                  muscle           



                                                  spasm, #           



                                                  30 tab, 0           



                                                  Refill(s),           



                                                  Pharmacy:           



                                                  [facility]           



                                                  #[id number],           



                                                  165.1, cm,           



                                                  21           



                                                  15:23:00           



                                                  CST,           



                                                  Height,           



                                                  107.301,           



                                                  kg,            



                                                  21           



                                                  15:23:00           



                                                  CST,           



                                                  Weight           

 

     Acetaminoph      2021      Yes                      1 tab, PO,           

[facility]



     en 325 MG /      2-27                               Q12H, PRN           l



     tramadol      21:40:                               for pain,           [facility]

[facility]



     hydrochlori      00                                 X 15 day,           



     de 37.5 MG                                         # 30 tab,           



     Oral Tablet                                         0              



     [Ultracet]                                         Refill(s),           



                                                  Pharmacy:           



                                                  [facility]           



                                                  #[id number],           



                                                  165.1, cm,           



                                                  21           



                                                  15:23:00           



                                                  CST,           



                                                  Height,           



                                                  107.301,           



                                                  kg,            



                                                  21           



                                                  15:23:00           



                                                  CST,           



                                                  Weight           

 

     tizanidine      2021      Yes                      4 mg = 1           [facility]

[facility]



     4 mg oral      2-27                               tab, PO,           l



     tablet      21:40:                               Bedtime,           [place]



                                                PRN for           



                                                  muscle           



                                                  spasm, #           



                                                  30 tab, 0           



                                                  Refill(s),           



                                                  Pharmacy:           



                                                  [facility]           



                                                  #[id number],           



                                                  165.1, cm,           



                                                  21           



                                                  15:23:00           



                                                  CST,           



                                                  Height,           



                                                  107.301,           



                                                  kg,            



                                                  21           



                                                  15:23:00           



                                                  CST,           



                                                  Weight           

 

     Acetaminoph      2021      Yes                      1 tab, PO,           

[facility]



     en 325 MG /      2-27                               Q12H, PRN           l



     tramadol      21:40:                               for pain,           [facility]

[facility]



     hydrochlori      00                                 X 15 day,           



     de 37.5 MG                                         # 30 tab,           



     Oral Tablet                                         0              



     [Ultracet]                                         Refill(s),           



                                                  Pharmacy:           



                                                  [facility]           



                                                  #[id number],           



                                                  165.1, cm,           



                                                  21           



                                                  15:23:00           



                                                  CST,           



                                                  Height,           



                                                  107.301,           



                                                  kg,            



                                                  21           



                                                  15:23:00           



                                                  CST,           



                                                  Weight           

 

     sevelamer      2021      Yes                      1,600 mg =           [facility]

[facility]



     800 mg oral      2-27                               2 tab, PO,           l



     tablet      21:21:                               TID-Meals,           [facility]

nn



               00                                 # 180 tab,           



                                                  0              



                                                  Refill(s)           

 

     sevelamer      2021      Yes                      1,600 mg =           [facility]

[facility]



     800 mg oral      2-27                               2 tab, PO,           l



     tablet      21:21:                               TID-Meals,           [facility]

nn



               00                                 # 180 tab,           



                                                  0              



                                                  Refill(s)           

 

     Mupirocin      2021      Yes                      1 appl,           [facility]

[facility]



     0.02 MG/MG      1-22                               TOP, TID,           l



     Topical      23:21:                               X 5 day, #           [facility]

[facility]



     Ointment      00                                 22 gm, 0           



                                                  Refill(s),           



                                                  Pharmacy:           



                                                  [facility]           



                                                  [facility]           



                                                  #[id number],           



                                                  165.1, cm,           



                                                  21           



                                                  14:08:00           



                                                  CST,           



                                                  Height,           



                                                  136.42,           



                                                  kg,            



                                                  21           



                                                  14:08:00           



                                                  CST,           



                                                  Weight           

 

     Mupirocin      2021      Yes                      1 appl,           [facility]

[facility]



     0.02 MG/MG      1-22                               TOP, TID,           l



     Topical      23:21:                               X 5 day, #           [facility]

[facility]



     Ointment      00                                 22 gm, 0           



                                                  Refill(s),           



                                                  Pharmacy:           



                                                  [facility]           



                                                  [facility]           



                                                  #[id number],           



                                                  165.1, cm,           



                                                  21           



                                                  14:08:00           



                                                  CST,           



                                                  Height,           



                                                  136.42,           



                                                  kg,            



                                                  21           



                                                  14:08:00           



                                                  CST,           



                                                  Weight           

 

     bumetanide      2021      Yes                      2 mg = 1           [facility]

[facility]



     2 mg oral      1-19                               tab, PO,           l



     tablet      21:11:                               Daily, #           [place]



               00                                 30 tab, 0           



                                                  Refill(s)           

 

     bumetanide      2021      Yes                      2 mg = 1           [facility]

[facility]



     2 mg oral      1-19                               tab, PO,           l



     tablet      21:11:                               Daily, #           [place]



               00                                 30 tab, 0           



                                                  Refill(s)           

 

     allopurinol      2021      Yes                      100 mg = 1           

[facility]



     100 mg oral      1-19                               tab, PO,           l



     tablet      21:10:                               BID, # 60           [facility]

n



               00                                 tab, 0           



                                                  Refill(s)           

 

     gabapentin      2021      Yes                      200 mg = 2           [facility]

[facility]



     100 MG Oral      1-19                               cap, PO,           l



     Capsule      21:10:                               Bedtime, 0           [facility]

[facility]



               00                                 Refill(s)           

 

     allopurinol      2021      Yes                      100 mg = 1           

[facility]



     100 mg oral      1-19                               tab, PO,           l



     tablet      21:10:                               BID, # 60           [facility]

n



               00                                 tab, 0           



                                                  Refill(s)           

 

     gabapentin      2021      Yes                      200 mg = 2           [facility]

[facility]



     100 MG Oral      1-19                               cap, PO,           l



     Capsule      21:10:                               Bedtime, 0           [facility]

[facility]



               00                                 Refill(s)           

 

     QUEtiapine      -0      Yes                      1 tablet,           [facility]

[facility]



     50 mg oral      3-30                               once a           l



     tablet      13:55:                               day, 0           [facility]



               00                                 Refill(s)           

 

     torsemide      -0      Yes                      1 tablet,           [facility]

[facility]



     20 mg oral      3-30                               twice a           l



     tablet      13:55:                               day, 0           [place]



               00                                 Refill(s)           

 

     QUEtiapine      -0      Yes                      1 tablet,           [facility]

[facility]



     50 mg oral      3-30                               once a           l



     tablet      13:55:                               day, 0           [place]



               00                                 Refill(s)           

 

     torsemide      -0      Yes                      1 tablet,           [facility]

[facility]



     20 mg oral      3-30                               twice a           l



     tablet      13:55:                               day, 0           [place]



               00                                 Refill(s)           

 

     potassium      0      Yes                      1 tablet,           [facility]

[facility]



     chloride 20      3-30                               once a           l



     mEq oral      13:54:                               day, 0           [place]



     tablet,      00                                 Refill(s)           



     extended                                                        



     release                                                        



     (KCL)                                                        

 

     potassium      0      Yes                      1 tablet,           [facility]

[facility]



     chloride 20      3-30                               once a           l



     mEq oral      13:54:                               day, 0           [facility]



     tablet,      00                                 Refill(s)           



     extended                                                        



     release                                                        



     (KCL)                                                        

 

     allopurinol            Yes                      1/2            [facility]

a



     300 mg oral      3-30                               tablet,           l



     tablet      13:53:                               once a           [place]



               00                                 day, 0           



                                                  Refill(s)           

 

     carvedilol            Yes                      1 tablet,           [facility]

[facility]



     3.125 mg      3-30                               twice a           l



     oral tablet      13:53:                               day, 0           [facility]

[facility]



               00                                 Refill(s)           

 

     Digoxin      0      Yes                      1 tablet,           [facility]

[facility]



     0.125 MG      3-30                               once a           l



     Oral Tablet      13:53:                               day, 0           [facility]

[facility]



               00                                 Refill(s)           

 

     DULoxetine      -0      Yes                      1 capsule,           [facility]

[facility]



     60 mg oral      3-30                               once a           l



     delayed      13:53:                               day, 0           [place]



     release      00                                 Refill(s)           



     capsule                                                        

 

     apixaban 5      0      Yes                      1 tablet,           [facility]

[facility]



     MG Oral      3-30                               twice a           l



     Tablet      13:53:                               day, 0           [place]



     [Eliquis]      00                                 Refill(s)           

 

     gabapentin      0      Yes                      1 capsule,           [facility]

[facility]



     300 MG Oral      3-30                               twice a           l



     Capsule      13:53:                               day, 0           [facility]



               00                                 Refill(s)           

 

     metoprolol      0      Yes                      1 tablet,           [facility]

[facility]



     tartrate 50      3-30                               Twice a           l



     mg oral      13:53:                               day, 0           [facility]



     tablet      00                                 Refill(s)           

 

     midodrine 5            Yes                      1 tablet,           [facility]

[facility]



     mg oral      3-30                               twice a           l



     tablet      13:53:                               day, 0           [place]



               00                                 Refill(s)           

 

     allopurinol            Yes                      1/2            [facility]

a



     300 mg oral      3-30                               tablet,           l



     tablet      13:53:                               once a           [facility]



               00                                 day, 0           



                                                  Refill(s)           

 

     carvedilol            Yes                      1 tablet,           [facility]

[facility]



     3.125 mg      3-30                               twice a           l



     oral tablet      13:53:                               day, 0           [facility]

[facility]



               00                                 Refill(s)           

 

     Digoxin            Yes                      1 tablet,           [facility]

[facility]



     0.125 MG      3-30                               once a           l



     Oral Tablet      13:53:                               day, 0           [facility]

[facility]



               00                                 Refill(s)           

 

     DULoxetine            Yes                      1 capsule,           [facility]

[facility]



     60 mg oral      3-30                               once a           l



     delayed      13:53:                               day, 0           [place]



     release      00                                 Refill(s)           



     capsule                                                        

 

     apixaban 5            Yes                      1 tablet,           [facility]

[facility]



     MG Oral      3-30                               twice a           l



     Tablet      13:53:                               day, 0           [facility]



     [Eliquis]      00                                 Refill(s)           

 

     gabapentin            Yes                      1 capsule,           [facility]

[facility]



     300 MG Oral      3-30                               twice a           l



     Capsule      13:53:                               day, 0           [place]



               00                                 Refill(s)           

 

     metoprolol            Yes                      1 tablet,           [facility]

[facility]



     tartrate 50      3-30                               Twice a           l



     mg oral      13:53:                               day, 0           [place]



     tablet      00                                 Refill(s)           

 

     midodrine 5            Yes                      1 tablet,           [facility]

[facility]



     mg oral      3-30                               twice a           l



     tablet      13:53:                               day, 0           [facility]



               00                                 Refill(s)           

 

     DULoxetine      2020      Yes                      60 mg = 1           [facility]

[facility]



     60 mg oral      1-25                               cap, PO,           l



     delayed      17:17:                               Daily, #           [facility]

n



     release      00                                 30 cap, 0           



     capsule                                         Refill(s)           

 

     Spironolact      2020      Yes                      25 mg = 1           M

[facility]



     one 25 MG      1-25                               tab, PO,           l



     Oral Tablet      17:17:                               Daily, 0           He

rmann



     [Aldactone]      00                                 Refill(s)           

 

     DULoxetine      2020-1      Yes                      60 mg = 1           Me

moria



     60 mg oral      1-25                               cap, PO,           l



     delayed      17:17:                               Daily, #           [facility]

n



     release      00                                 30 cap, 0           



     capsule                                         Refill(s)           

 

     Spironolact      2020      Yes                      25 mg = 1           M

em[facility]



     one 25 MG      1-25                               tab, PO,           l



     Oral Tablet      17:17:                               Daily, 0           He

rmann



     [Aldactone]      00                                 Refill(s)           

 

     Digoxin      2020      Yes                      0.125 mg,           [facility]

ia



     0.125 MG      1-25                               PO, Daily,           l



     Oral Tablet      17:13:                               # 30 tab,           H

ermann



               00                                 0              



                                                  Refill(s)           

 

     Digoxin      2020      Yes                      0.125 mg,           [facility]

ia



     0.125 MG      1-25                               PO, Daily,           l



     Oral Tablet      17:13:                               # 30 tab,           H

ermann



               00                                 0              



                                                  Refill(s)           

 

     Mupirocin      2020      Yes                      See            [facility]



     0.02 MG/MG      0-13                               Instructio           l



     Topical      15:44:                               ns, APPLY           [facility]

nn



     Ointment      00                                 EXTERNALLY           



                                                  TO THE           



                                                  AFFECTED           



                                                  AREA TWICE           



                                                  DAILY, #           



                                                  66 gm, 1           



                                                  Refill(s),           



                                                  Pharmacy:           



                                                  [facility]           



                                                  #[id number],           



                                                  170.18,           



                                                  cm,            



                                                  20           



                                                  14:42:00           



                                                  CST,           



                                                  Height,           



                                                  164.318,           



                                                  kg,            



                                                  20           



                                                  14:42:00           



                                                  CST,           



                                                  Weight           

 

     Mupirocin      2020      Yes                      See            [facility]



     0.02 MG/MG      0-13                               Instructio           l



     Topical      15:44:                               ns, APPLY           [facility]

nn



     Ointment      00                                 EXTERNALLY           



                                                  TO THE           



                                                  AFFECTED           



                                                  AREA TWICE           



                                                  DAILY, #           



                                                  66 gm, 1           



                                                  Refill(s),           



                                                  Pharmacy:           



                                                  [facility]           



                                                  #[id number],           



                                                  170.18,           



                                                  cm,            



                                                  20           



                                                  14:42:00           



                                                  CST,           



                                                  Height,           



                                                  164.318,           



                                                  kg,            



                                                  20           



                                                  14:42:00           



                                                  CST,           



                                                  Weight           

 

     Nitrofurant            Yes                      100 mg = 1           

[facility]



     oin 100 MG      8-09                               cap, PO,           l



     Oral      17:57:                               BID, X 10           [place]



     Capsule      00                                 day, # 20           



     [Macrobid]                                         cap, 0           



                                                  Refill(s),           



                                                  Pharmacy:           



                                                  [facility]           



                                                  #[id number],           



                                                  170.18,           



                                                  cm,            



                                                  20           



                                                  14:42:00           



                                                  CST,           



                                                  Height,           



                                                  164.318,           



                                                  kg,            



                                                  20           



                                                  14:42:00           



                                                  CST,           



                                                  Weight           

 

     Nitrofurant      2020-0      Yes                      100 mg = 1           

[facility]



     oin 100 MG      8-09                               cap, PO,           l



     Oral      17:57:                               BID, X 10           [place]



     Capsule      00                                 day, # 20           



     [Macrobid]                                         cap, 0           



                                                  Refill(s),           



                                                  Pharmacy:           



                                                  [facility]           



                                                  #[id number],           



                                                  170.18,           



                                                  cm,            



                                                  20           



                                                  14:42:00           



                                                  CST,           



                                                  Height,           



                                                  164.318,           



                                                  kg,            



                                                  20           



                                                  14:42:00           



                                                  CST,           



                                                  Weight           

 

     lisinopril      2020-0      Yes                      2.5 mg = 1           M

emoria



     2.5 mg oral      6-04                               tab, PO,           l



     tablet      23:26:                               Daily, #           [facility]



               00                                 30 tab, 2           



                                                  Refill(s),           



                                                  called to           



                                                  pharmacy           

 

     lisinopril      2020-0      Yes                      2.5 mg = 1           M

em[facility]



     2.5 mg oral      6-04                               tab, PO,           l



     tablet      23:26:                               Daily, #           [place]



               00                                 30 tab, 2           



                                                  Refill(s),           



                                                  called to           



                                                  pharmacy           

 

     calcitriol      2020-0      Yes                      = 1 cap,           [facility]

[facility]



     0.25 mcg      5-20                               PO, Daily,           l



     oral      12:18:                               # 90 cap,           [facility]



     capsule      00                                 1              



                                                  Refill(s),           



                                                  Pharmacy:           



                                                  [facility]           



                                                  [facility] [place]           



                                                  #[id number]           

 

     calcitriol      2020-0      Yes                      = 1 cap,           [facility]

[facility]



     0.25 mcg      5-20                               PO, Daily,           l



     oral      12:18:                               # 90 cap,           [place]



     capsule      00                                 1              



                                                  Refill(s),           



                                                  Pharmacy:           



                                                  [facility]           



                                                  [facility] [place]           



                                                  #[id number]           

 

     calcitriol      2020-0      Yes                      = 1 cap,           [facility]

[facility]



     0.25 mcg      4-16                               PO, Daily,           l



     oral      16:37:                               # 90           [facility]



     capsule      47                                 unknown           



                                                  unit,           



                                                  Pharmacy:           



                                                  [facility]           



                                                  [facility]           



                                                  #[id number]           

 

     calcitriol      2020-0      Yes                      = 1 cap,           [facility]

[facility]



     0.25 mcg      4-16                               PO, Daily,           l



     oral      16:37:                               # 90           [facility]



     capsule      47                                 unknown           



                                                  unit,           



                                                  Pharmacy:           



                                                  [facility]           



                                                  [facility] [place]           



                                                  #[id number]           

 

     Furosemide      2020-0      Yes                      = 1 tab,           [facility]

oria



     40 MG Oral      4-13                               PO, Every           l



     Tablet      20:06:                               Other Day,           [facility]

nn



               19                                 # 45 tab,           



                                                  Pharmacy:           



                                                  [facility]           



                                                  [facility]           



                                                  #[id number]           

 

     Furosemide      2020-0      Yes                      = 1 tab,           [facility]

[facility]



     40 MG Oral      4-13                               PO, Every           l



     Tablet      20:06:                               Other Day,           [facility]



               19                                 # 45 tab,           



                                                  Pharmacy:           



                                                  [facility]           



                                                  [facility]           



                                                  #[id number]           

 

     prednisolon      2020-0      Yes                      1 drop in           M

[facility]



     e acetate      4-10                               each eye,           l



     10 MG/ML      20:53:                               once           [place]



     Ophthalmic      00                                 daily, 0           



     Suspension                                         Refill(s)           

 

     bromfenac      2020-0      Yes                      1 drop in           [facility]

oria



     0.7 MG/ML      4-10                               right eye,           l



     Ophthalmic      20:53:                               once           [place]



     Solution      00                                 daily, 0           



     [Prolensa]                                         Refill(s)           

 

     prednisolon      2020-0      Yes                      1 drop in           M

em[facility]



     e acetate      4-10                               each eye,           l



     10 MG/ML      20:53:                               once           [facility]



     Ophthalmic      00                                 daily, 0           



     Suspension                                         Refill(s)           

 

     bromfenac      2020-0      Yes                      1 drop in           [facility]

oria



     0.7 MG/ML      4-10                               right eye,           l



     Ophthalmic      20:53:                               once           [place]



     Solution      00                                 daily, 0           



     [Prolensa]                                         Refill(s)           

 

     Furosemide      2020-0      Yes                      = 1 tab,           [facility]

oria



     40 MG Oral      1-23                               PO, Every           l



     Tablet      15:13:                               Other Day,           [facility]



               34                                 # 45 tab,           



                                                  Pharmacy:           



                                                  [facility]           



                                                  [facility] [place]           



                                                  #[id number]           

 

     Furosemide      2020-0      Yes                      = 1 tab,           [facility]

[facility]



     40 MG Oral      1-23                               PO, Every           l



     Tablet      15:13:                               Other Day,           [facility]



               34                                 # 45 tab,           



                                                  Pharmacy:           



                                                  [facility]           



                                                  [facility] [place]           



                                                  #[id number]           

 

     Mupirocin      2019      Yes                      See            [facility]



     0.02 MG/MG      2-27                               Instructio           l



     Topical      19:11:                               ns, # 66           [facility]

n



     Ointment      15                                 gm, APPLY           



                                                  EXTERNALLY           



                                                  TO THE           



                                                  AFFECTED           



                                                  AREA TWICE           



                                                  DAILY,           



                                                  Pharmacy:           



                                                  [facility]           



                                                  [facility]           



                                                  #[id number]           

 

     Mupirocin      2019      Yes                      See            [facility]



     0.02 MG/MG      2-27                               Instructio           l



     Topical      19:11:                               ns, # 66           [facility]

n



     Ointment      15                                 gm, APPLY           



                                                  EXTERNALLY           



                                                  TO THE           



                                                  AFFECTED           



                                                  AREA TWICE           



                                                  DAILY,           



                                                  Pharmacy:           



                                                  [facility]           



                                                  [facility]           



                                                  #[id number]           

 

     Nitrofurant      2019      Yes                      100 mg = 1           

[facility]



     oin 100 MG      2-13                               cap, PO,           l



     Oral      01:44:                               BID, X 5           [facility]



     Capsule      00                                 day, # 10           



     [Macrodanti                                         cap, 0           



     n]                                           Refill(s),           



                                                  Pharmacy:           



                                                  [facility]           



                                                  [facility]           



                                                  #[id number]           

 

     Nitrofurant      2019      Yes                      100 mg = 1           

[facility]



     oin 100 MG      2-13                               cap, PO,           l



     Oral      01:44:                               BID, X 5           [facility]



     Capsule      00                                 day, # 10           



     [Macrodanti                                         cap, 0           



     n]                                           Refill(s),           



                                                  Pharmacy:           



                                                  [facility]           



                                                  [facility] [place]           



                                                  #[id number]           

 

     apixaban 2019      Yes                      5 mg, PO,           Me

moria



     MG Oral      0-25                               Q12H, 0           l



     Tablet      15:07:                               Refill(s)           [facility]

n



     [Eliquis]      00                                                

 

     metoprolol      2019      Yes                      50 mg = 1           Me

[facility]ia



     tartrate 50      0-25                               tab, PO,           l



     mg oral      15:07:                               BID, # 180           [facility]

daryl



     tablet      00                                 tab, 0           



                                                  Refill(s)           

 

     Diltiazem      2019      Yes                      180 mg = 1           [facility]

[facility]



     Hydrochlori      0-25                               cap, PO,           l



     de       15:07:                               Daily, #           [facility]

daryl



     mg/24 hours      00                                 30 cap, 0           



     oral                                         Refill(s)           



     capsule,                                                        



     extended                                                        



     release                                                        

 

     tramadol      2019      Yes                      150 mg = 1           [facility]

[facility]



     150 mg/24      0-25                               cap, PO,           l



     hours oral      15:07:                               Daily, 0           [facility]

hernandes



     capsule,      00                                 Refill(s)           



     extended                                                        



     release                                                        

 

     Acetaminoph      2019      Yes                      1 tab, PO,           

[facility]



     en 325 MG /      0-25                               Q6H, 0           l



     Hydrocodone      15:07:                               Refill(s)           H

[facility]



     Bitartrate      00                                                



     5 MG Oral                                                        



     Tablet                                                        



     [Norco                                                        



     5/325]                                                        

 

     apixaban 2019      Yes                      5 mg, PO,           Me

moria



     MG Oral      0-25                               Q12H, 0           l



     Tablet      15:07:                               Refill(s)           [facility]

n



     [Eliquis]      00                                                

 

     metoprolol      2019      Yes                      50 mg = 1           Me

moria



     tartrate 50      0-25                               tab, PO,           l



     mg oral      15:07:                               BID, # 180           [facility]

daryl



     tablet      00                                 tab, 0           



                                                  Refill(s)           

 

     Diltiazem      2019      Yes                      180 mg = 1           [facility]

[facility]ia



     Hydrochlori      0-25                               cap, PO,           l



     de       15:07:                               Daily, #           [facility]

daryl



     mg/24 hours      00                                 30 cap, 0           



     oral                                         Refill(s)           



     capsule,                                                        



     extended                                                        



     release                                                        

 

     tramadol      2019      Yes                      150 mg = 1           [facility]

[facility]



     150 mg/24      0-25                               cap, PO,           l



     hours oral      15:07:                               Daily, 0           Her

hernandes



     capsule,      00                                 Refill(s)           



     extended                                                        



     release                                                        

 

     Acetaminoph      2019      Yes                      1 tab, PO,           

[facility]



     en 325 MG /      0-25                               Q6H, 0           l



     Hydrocodone      15:07:                               Refill(s)           H

[facility]



     Bitartrate      00                                                



     5 MG Oral                                                        



     Tablet                                                        



     [Norco                                                        



     5/325]                                                        

 

     calcitriol      2019      Yes                      = 1 cap,           [facility]

[facility]



     0.25 mcg      0-11                               PO, Daily,           l



     oral      21:10:                               # 90           [facility]



     capsule      30                                 unknown           



                                                  unit,           



                                                  Pharmacy:           



                                                  [facility]           



                                                  [facility]           



                                                  #[id number]           

 

     calcitriol      2019      Yes                      = 1 cap,           [facility]

[facility]



     0.25 mcg      0-11                               PO, Daily,           l



     oral      21:10:                               # 90           [facility]



     capsule      30                                 unknown           



                                                  unit,           



                                                  Pharmacy:           



                                                  [facility]           



                                                  [facility]           



                                                  #[id number]           

 

     gabapentin            Yes                      300 mg = 1           M

[facility]



     300 MG Oral      9-27                               cap, PO,           l



     Capsule      20:54:                               BID, # 180           [facility]

daryl



               00                                 cap, 3           



                                                  Refill(s),           



                                                  called to           



                                                  pharmacy           

 

     gabapentin            Yes                      300 mg = 1           M

[facility]



     300 MG Oral      9-27                               cap, PO,           l



     Capsule      20:54:                               BID, # 180           [facility]

daryl



               00                                 cap, 3           



                                                  Refill(s),           



                                                  called to           



                                                  pharmacy           

 

     allopurinol            Yes                      = 1 tab,           Me

moria



     300 mg oral      8-17                               PO, Daily,           l



     tablet      02:39:                               # 90 tabDonny

n



               31                                 Pharmacy:           



                                                  [facility]           



                                                  [facility]           



                                                  #[id number]           

 

     allopurinol      -      Yes                      = 1 tab,           Me

[facility]



     300 mg oral      8-17                               PO, Daily,           l



     tablet      02:39:                               # 90 Donny rothman

n



               31                                 Pharmacy:           



                                                  [facility]           



                                                  [facility]           



                                                  #[id number]           

 

     QUEtiapine      -      Yes                      50 mg = 1           Me

[facility]



     50 mg oral      6-06                               tab, PO,           l



     tablet      15:28:                               Bedtime, #           [facility]

nn



               00                                 30 tab, 1           



                                                  Refill(s)           

 

     QUEtiapine      -      Yes                      50 mg = 1           [facility]

[facility]



     50 mg oral      6-06                               tab, PO,           l



     tablet      15:28:                               Bedtime, #           [facility]

nn



               00                                 30 tab, 1           



                                                  Refill(s)           

 

     eletriptan      -      Yes                      40 mg = 1           Me

[facility]



     40 mg oral      6-06                               tab, PO,           l



     tablet      15:26:                               Daily, PRN           [facility]

nn



               00                                 for            



                                                  migraine           



                                                  headache,           



                                                  may repeat           



                                                  dose once           



                                                  in 2           



                                                  hours, # 6           



                                                  tab, 0           



                                                  Refill(s)           

 

     eletriptan      2019      Yes                      40 mg = 1           [facility]

[facility]



     40 mg oral      6-06                               tab, PO,           l



     tablet      15:26:                               Daily, PRN           [facility]

nn



               00                                 for            



                                                  migraine           



                                                  headache,           



                                                  may repeat           



                                                  dose once           



                                                  in 2           



                                                  hours, # 6           



                                                  tab, 0           



                                                  Refill(s)           

 

     atorvastati            Yes                      See            [facility]

a



     n 40 mg      3-14                               Instructio           l



     oral tablet      15:07:                               ns, TAKE 1           

[facility]



               35                                 TABLET BY           



                                                  MOUTH           



                                                  EVERY           



                                                  NIGHT AT           



                                                  BEDTIME, #           



                                                  90 tab, 1           



                                                  Refill(s),           



                                                  Pharmacy:           



                                                  [facility]           



                                                  [facility]           



                                                  [id number]           

 

     atorvastati            Yes                      See            [facility]

a



     n 40 mg      3-14                               Instructio           l



     oral tablet      15:07:                               ns, TAKE 1           

[facility]



               35                                 TABLET BY           



                                                  MOUTH           



                                                  EVERY           



                                                  NIGHT AT           



                                                  BEDTIME, #           



                                                  90 tab, 1           



                                                  Refill(s),           



                                                  Pharmacy:           



                                                  [facility]           



                                                  [facility]           



                                                  [id number]           

 

     amLODIPine            Yes                      See            [facility]



     5 mg oral      3-14                               Instructio           l



     tablet      15:07:                               ns, TAKE 1           [facility]

nn



               33                                 TABLET BY           



                                                  MOUTH           



                                                  EVERY DAY,           



                                                  # 90 tab,           



                                                  1              



                                                  Refill(s),           



                                                  Pharmacy:           



                                                  [facility]           



                                                  [facility]           



                                                  [facility]           

 

     amLODIPine            Yes                      See            [facility]



     5 mg oral      3-14                               Instructio           l



     tablet      15:07:                               ns, TAKE 1           [facility]

nn



               33                                 TABLET BY           



                                                  MOUTH           



                                                  EVERY DAY,           



                                                  # 90 tab,           



                                                  1              



                                                  Refill(s),           



                                                  Pharmacy:           



                                                  [facility]           



                                                  [facility]           



                                                  [id number]           

 

     lisinopril            Yes                      See            [facility]



     5 mg oral      8-21                               Instructio           l



     tablet      19:00:                               ns, TAKE 1           [facility]

nn



               30                                 TABLET BY           



                                                  MOUTH           



                                                  DAILY, #           



                                                  90 tab, 1           



                                                  Refill(s),           



                                                  Pharmacy:           



                                                  [facility]           



                                                  [facility]           



                                                  [facility]           

 

     lisinopril            Yes                      See            [facility]



     5 mg oral      8-21                               Instructio           l



     tablet      19:00:                               ns, TAKE 1           [facility]

nn



               30                                 TABLET BY           



                                                  MOUTH           



                                                  DAILY, #           



                                                  90 tab, 1           



                                                  Refill(s),           



                                                  Pharmacy:           



                                                  [facility]           



                                                  [facility]           



                                                  [facility]           

 

     atorvastati            Yes                      See            [facility]

a



     n 40 mg      8-21                               Instructio           l



     oral tablet      18:50:                               ns, TAKE 1           

[place]



               45                                 TABLET BY           



                                                  MOUTH           



                                                  EVERY           



                                                  NIGHT AT           



                                                  BEDTIME, #           



                                                  30 tab, 5           



                                                  Refill(s),           



                                                  Pharmacy:           



                                                  [facility]           



                                                  [facility]           



                                                  [id number]           

 

     atorvastati            Yes                      See            [facility]

a



     n 40 mg      8-21                               Instructio           l



     oral tablet      18:50:                               ns, TAKE 1           

[place]



               45                                 TABLET BY           



                                                  MOUTH           



                                                  EVERY           



                                                  NIGHT AT           



                                                  BEDTIME, #           



                                                  30 tab, 5           



                                                  Refill(s),           



                                                  Pharmacy:           



                                                  [facility]           



                                                  [facility]           



                                                  [id number]           

 

     amLODIPine            Yes                      See            [facility]



     5 mg oral      8-21                               Instructio           l



     tablet      18:50:                               ns, TAKE 1           [facility]

nn



               41                                 TABLET BY           



                                                  MOUTH           



                                                  EVERY DAY,           



                                                  # 30 tab,           



                                                  5              



                                                  Refill(s),           



                                                  Pharmacy:           



                                                  [facility]           



                                                  [facility]           



                                                  [id number]           

 

     amLODIPine            Yes                      See            [facility]



     5 mg oral      8-21                               Instructio           l



     tablet      18:50:                               ns, TAKE 1           [facility]

nn



               41                                 TABLET BY           



                                                  MOUTH           



                                                  EVERY DAY,           



                                                  # 30 tab,           



                                                  5              



                                                  Refill(s),           



                                                  Pharmacy:           



                                                  [facility]           



                                                  [facility]           



                                                  [id number]           

 

     lisinopril            No                       See            [facility]



     5 mg oral      7-31                               Instructio           l



     tablet      15:28:                               ns, # 90           [facility]



               34                                 tab, TAKE           



                                                  1 TABLET           



                                                  BY MOUTH           



                                                  DAILY,           



                                                  Pharmacy:           



                                                  [facility]           



                                                  [facility]           



                                                  [id number]           

 

     lisinopril            No                       See            [facility]



     5 mg oral      7-31                               Instructio           l



     tablet      15:28:                               ns, # 90           [facility]



               34                                 tab, TAKE           



                                                  1 TABLET           



                                                  BY MOUTH           



                                                  DAILY,           



                                                  Pharmacy:           



                                                  [facility]           



                                                  [facility]           



                                                  [id number]           

 

     allopurinol            No                       300 mg = 1           

[facility]



     300 mg oral      5-10                               tab, PO,           l



     tablet      13:59:                               Daily, #           [facility]



               00                                 90 tab, 1           



                                                  Refill(s),           



                                                  Pharmacy:           



                                                  [facility]           



                                                  [facility]           



                                                  [id number]           

 

     allopurinol            No                       300 mg = 1           

[facility]



     300 mg oral      5-10                               tab, PO,           l



     tablet      13:59:                               Daily, #           [facility]



               00                                 90 tab, 1           



                                                  Refill(s),           



                                                  Pharmacy:           



                                                  [facility]           



                                                  [facility]           



                                                  [id number]           

 

     lisinopril            No                       See            [facility]



     5 mg oral      5-01                               Instructio           l



     tablet      12:38:                               ns, # 90           [facility]



               18                                 tab, TAKE           



                                                  1 TABLET           



                                                  BY MOUTH           



                                                  DAILY,           



                                                  Pharmacy:           



                                                  [facility]           



                                                  [facility]           



                                                  [id number]           

 

     ALLOPURINOL            Yes                      See            [facility]

a



     300MG      5-01                               Instructio           l



     TABLETS      12:38:                               ns, # 90           Don[facility]

n



               18                                 tab, TAKE           



                                                  1 TABLET           



                                                  BY MOUTH           



                                                  DAILY,           



                                                  Pharmacy:           



                                                  [place] [id number]           

 

     lisinopril            No                       See            [facility]



     5 mg oral      5-01                               Instructio           l



     tablet      12:38:                               ns, # 90           [place]



               18                                 tab, TAKE           



                                                  1 TABLET           



                                                  BY MOUTH           



                                                  DAILY,           



                                                  Pharmacy:           



                                                  [place] [id number]           

 

     ALLOPURINOL            Yes                      See            [facility]

a



     300MG      5-01                               Instructio           l



     TABLETS      12:38:                               ns, # 90           Don[facility]

n



               18                                 tab, TAKE           



                                                  1 TABLET           



                                                  BY MOUTH           



                                                  DAILY,           



                                                  Pharmacy:           



                                                  [place] [id number]           

 

     calcitriol            Yes                      0.25           [facility]



     0.25 mcg      3-28                               microgram           l



     oral      13:49:                               = 1 cap,           [facility]



     capsule      00                                 PO, Daily,           



                                                  # 90 cap,           



                                                  3              



                                                  Refill(s),           



                                                  Pharmacy:           



                                                  [place] [address]           

 

     calcitriol            Yes                      0.25           [facility]



     0.25 mcg      3-28                               microgram           l



     oral      13:49:                               = 1 cap,           [facility]



     capsule      00                                 PO, Daily,           



                                                  # 90 cap,           



                                                  3              



                                                  Refill(s),           



                                                  Pharmacy:           



                                                  [place] [address]           

 

     Mupirocin            Yes                      1 appl,           [facility]

ia



     0.02 MG/MG      3-21                               TOP, BID,           l



     Topical      15:37:                               30 grams           [facility]

n



     Ointment      21                                 3each, # 3           



                                                  ea, 1           



                                                  Refill(s),           



                                                  Pharmacy:           



                                                  [place] [id number]           

 

     Mupirocin            Yes                      1 appl,           [facility]

ia



     0.02 MG/MG      3-21                               TOP, BID,           l



     Topical      15:37:                               30 grams           [facility]

n



     Ointment      21                                 3each, # 3           



                                                  ea, 1           



                                                  Refill(s),           



                                                  Pharmacy:           



                                                  [place] [id number]           

 

     atorvastati            Yes                      See            [facility]

a



     n 40 mg      3-21                               Instructio           l



     oral tablet      15:36:                               ns, TAKE 1           

[facility]



               22                                 TABLET BY           



                                                  MOUTH           



                                                  EVERY           



                                                  NIGHT AT           



                                                  BEDTIME, #           



                                                  30 tab, 5           



                                                  Refill(s),           



                                                  Pharmacy:           



                                                  [place] [id number]           

 

     atorvastati            Yes                      See            [facility]

a



     n 40 mg      3-21                               Instructio           l



     oral tablet      15:36:                               ns, TAKE 1           

[place]



               22                                 TABLET BY           



                                                  MOUTH           



                                                  EVERY           



                                                  NIGHT AT           



                                                  BEDTIME, #           



                                                  30 tab, 5           



                                                  Refill(s),           



                                                  Pharmacy:           



                                                  [place] [address]           

 

     amLODIPine            Yes                      See            [facility]



     5 mg oral      3-21                               Instructio           l



     tablet      15:36:                               ns, TAKE 1           [facility]

nn



               18                                 TABLET BY           



                                                  MOUTH           



                                                  EVERY DAY,           



                                                  # 30 tab,           



                                                  5              



                                                  Refill(s),           



                                                  Pharmacy:           



                                                  [facility]           



                                                  [facility]           



                                                  [id number]           

 

     amLODIPine            Yes                      See            [facility]



     5 mg oral      3-21                               Instructio           l



     tablet      15:36:                               ns, TAKE 1           [facility]

nn



               18                                 TABLET BY           



                                                  MOUTH           



                                                  EVERY DAY,           



                                                  # 30 tab,           



                                                  5              



                                                  Refill(s),           



                                                  Pharmacy:           



                                                  [facility]           



                                                  [facility]           



                                                  [id number]           

 

     aspirin 81            Yes                      81 mg = 1           [facility]

[facility]



     mg tablet,      1-24                               tab, PO,           l



     enteric      16:34:                               Daily, #           [facility]

n



     coated      00                                 90 tab, 3           



                                                  Refill(s)           

 

     aspirin 81            Yes                      81 mg = 1           [facility]

[facility]



     mg tablet,      1-24                               tab, PO,           l



     enteric      16:34:                               Daily, #           [facility]

n



     coated      00                                 90 tab, 3           



                                                  Refill(s)           

 

     Xopenex            No   Ghassan K                0.63 mg =           [facility]

[facility]



     0.63 mg/3      3-11           [facility]                3 mL,           l



     mL        01:00:                               Soln, NEB,           [facility]



     inhalation      00                                 Once,           



     solution                                         first dose           



                                                  03/10/16           



                                                  19:00:00           



                                                  CST, stop           



                                                  date           



                                                  03/10/16           



                                                  19:00:00           



                                                  CST            

 

     Xopenex            No   Ghassan K                0.63 mg =           [facility]

[facility]



     0.63 mg/3      3-11           [facility]                3 mL,           l



     mL        01:00:                               Soln, NEB,           [facility]



     inhalation      00                                 Once,           



     solution                                         first dose           



                                                  03/10/16           



                                                  19:00:00           



                                                  CST, stop           



                                                  date           



                                                  03/10/16           



                                                  19:00:00           



                                                  CST            

 

     Misc            No   Deuce                300 mL,           [facility]



     Medication      3-11           [place]                Soln-IV,           l



               00:03:                               IV, Once,           [facility]



                                                first dose           



                                                  03/10/16           



                                                  18:03:00           



                                                  CST, stop           



                                                  date           



                                                  03/10/16           



                                                  18:03:00           



                                                  CST            

 

     Misc            No   Deuce                300 mL,           [facility]



     Medication      3-11           [place]                Soln-IV,           l



               00:03:                               IV, Once,           [facility]



               00                                 first dose           



                                                  03/10/16           



                                                  18:03:00           



                                                  CST, stop           



                                                  date           



                                                  03/10/16           



                                                  18:03:00           



                                                  CST            

 

     promethazin            No   Ghassan K                12.5 mg =          

 [facility]



     e         3-11           [facility]                0.5 mL,           l



               00:02:                               Injection,           [facility]



               00                                 IM, Once           



                                                  PRN for           



                                                  severe           



                                                  nausea,           



                                                  first dose           



                                                  03/10/16           



                                                  18:02:00           



                                                  CST            

 

     albuterol            No   Ghassan K                2.5 mg = 3           

[facility]



     2.5 mg/3 mL      3-11           [facility]                mL, Soln,           

l



     (0.083%)      00:02:                               NEB, Once           [facility]

[facility]



     inhalation      00                                 PRN for           



     solution                                         wheezing,           



                                                  first dose           



                                                  03/10/16           



                                                  18:02:00           



                                                  CST            

 

     Demerol HCl            No   Ghassan K                12.5 mg =          

 [facility]



               3-11           [facility]                0.25 mL,           l



               00:02:                               Injection,           [place]



               00                                 IV Push,           



                                                  Once PRN           



                                                  for            



                                                  shivers,           



                                                  first dose           



                                                  03/10/16           



                                                  18:02:00           



                                                  CST            

 

     ondansetron            No   Ghassan K                4 mg = 2           

[facility]



               3-11           [facility]                mL,            l



               00:02:                               Injection,           [facility]



               00                                 IV Push,           



                                                  q15min PRN           



                                                  for            



                                                  nausea/vom           



                                                  iting,           



                                                  order           



                                                  duration:           



                                                  2 doses,           



                                                  first dose           



                                                  03/10/16           



                                                  18:02:00           



                                                  CST, stop           



                                                  date           



                                                  Limited #           



                                                  of times           

 

     Dilaudid            No   Ghassan K                0.5 mg =           [facility]

[facility]



               3-11           [facility]                0.25 mL,           l



               00:02:                               Injection,           [facility]



               00                                 IV Push,           



                                                  q10min PRN           



                                                  for pain           



                                                  severe           



                                                  (7-10),           



                                                  first dose           



                                                  03/10/16           



                                                  18:02:00           



                                                  CST            

 

     diphenhydrA            No   Ghassan K                25 mg =           [facility]

[facility]



     MINE      3-11           [facility]                0.5 mL,           l



               00:02:                               Injection,           [facility]



               00                                 IV Push,           



                                                  Once PRN           



                                                  for            



                                                  itching,           



                                                  first dose           



                                                  03/10/16           



                                                  18:02:00           



                                                  CST            

 

     LR 1,000 mL            No   Ghassan K                1,000 mL,          

 [facility]



               3-11           [facility]                IV, 75           l



               00:02:                               mL/hr,           [place]



               00                                 start date           



                                                  03/10/16           



                                                  18:02:00           



                                                  CST            

 

     Saline Lock            No   Ghassan K                10 mL,           [facility]

[facility]



     Flush      3-11           Ch[facility]                Soln, IV           l



               00:02:                               Push, As           [facility]



               00                                 Indicated           



                                                  PRN for           



                                                  flush,           



                                                  first dose           



                                                  03/10/16           



                                                  18:02:00           



                                                  CST            

 

     Bupivacaine            No   Ghassan K                300 mL,           [facility]

[facility]



     0.25% 300      3-11           Ch[facility]                Nerve           l



     mL pump 300      00:02:                               Block, 5           [facility]

[facility]



     mL        00                                 mL/hr,           



                                                  start date           



                                                  03/10/16           



                                                  18:02:00           



                                                  CST            

 

     promethazin            No   Ghassan K                12.5 mg =          

 [facility]



     e         3-11           [facility]                0.5 mL,           l



               00:02:                               Injection,           [place]



               00                                 IM, Once           



                                                  PRN for           



                                                  severe           



                                                  nausea,           



                                                  first dose           



                                                  03/10/16           



                                                  18:02:00           



                                                  CST            

 

     albuterol            No   Ghassan K                2.5 mg = 3           

[facility]



     2.5 mg/3 mL      3-11           [facility]                mL, Soln,           

l



     (0.083%)      00:02:                               NEB, Once           [facility]

d[facility]



     inhalation      00                                 PRN for           



     solution                                         wheezing,           



                                                  first dose           



                                                  03/10/16           



                                                  18:02:00           



                                                  CST            

 

     Demerol HCl            No   Ghassan K                12.5 mg =          

 [facility]



               3-11           [facility]                0.25 mL,           l



               00:02:                               Injection,           [facility]



               00                                 IV Push,           



                                                  Once PRN           



                                                  for            



                                                  shivers,           



                                                  first dose           



                                                  03/10/16           



                                                  18:02:00           



                                                  CST            

 

     ondansetron            No   Ghassan K                4 mg = 2           

[facility]



               3-11           [facility]                mL,            l



               00:02:                               Injection,           [place]



               00                                 IV Push,           



                                                  q15min PRN           



                                                  for            



                                                  nausea/vom           



                                                  iting,           



                                                  order           



                                                  duration:           



                                                  2 doses,           



                                                  first dose           



                                                  03/10/16           



                                                  18:02:00           



                                                  CST, stop           



                                                  date           



                                                  Limited #           



                                                  of times           

 

     Dilaudid            No   Ghassan K                0.5 mg =           [facility]

[facility]



               3-11           [facility]                0.25 mL,           l



               00:02:                               Injection,           [place]



               00                                 IV Push,           



                                                  q10min PRN           



                                                  for pain           



                                                  severe           



                                                  (7-10),           



                                                  first dose           



                                                  03/10/16           



                                                  18:02:00           



                                                  CST            

 

     diphenhydrA            No   Ghassan K                25 mg =           [facility]

[facility]



     MINE      3-11           Ch[facility]                0.5 mL,           l



               00:02:                               Injection,           [facility]



               00                                 IV Push,           



                                                  Once PRN           



                                                  for            



                                                  itching,           



                                                  first dose           



                                                  03/10/16           



                                                  18:02:00           



                                                  CST            

 

     LR 1,000 mL            No   Ghassan K                1,000 mL,          

 [facility]



               3-11           [facility]                IV, 75           l



               00:02:                               mL/hr,           [facility]



               00                                 start date           



                                                  03/10/16           



                                                  18:02:00           



                                                  CST            

 

     Saline Lock            No   Ghassan K                10 mL,           [facility]

[facility]



     Flush      3-11           [facility]                Soln, IV           l



               00:02:                               Push, As           [facility]



               00                                 Indicated           



                                                  PRN for           



                                                  flush,           



                                                  first dose           



                                                  03/10/16           



                                                  18:02:00           



                                                  CST            

 

     Bupivacaine      -0      No   Ghassan K                300 mL,           [facility]

[facility]



     0.25% 300      3-11           [facility]                Nerve           l



     mL pump 300      00:02:                               Block, 5           [facility]

[facility]



     mL        00                                 mL/hr,           



                                                  start date           



                                                  03/10/16           



                                                  18:02:00           



                                                  CST            

 

     Misc      0      No   Deuce                1,000 mL,           [facility]

a



     Medication      3-10           [facility]                Soln-IV,           l



               23:42:                               IV, Once,           [facility]                                 first dose           



                                                  03/10/16           



                                                  17:42:00           



                                                  CST, stop           



                                                  date           



                                                  03/10/16           



                                                  17:42:00           



                                                  CST            

 

     Misc            No   Deuce                1,000 mL,           [facility]

a



     Medication      3-10           [facility]                Soln-IV,           l



               23:42:                               IV, Once,           [facility]                                 first dose           



                                                  03/10/16           



                                                  17:42:00           



                                                  CST, stop           



                                                  date           



                                                  03/10/16           



                                                  17:42:00           



                                                  CST            

 

     fentaNYL            No   Deuce                25 mcg =           [facility]

[facility]



               3-10           [facility]                0.5 mL,           l



               23:20:                               Injection,           [place]



               00                                 IV, Once,           



                                                  first dose           



                                                  03/10/16           



                                                  17:20:00           



                                                  CST, stop           



                                                  date           



                                                  03/10/16           



                                                  17:20:00           



                                                  CST            

 

     fentaNYL      -0      No   Deuce                25 mcg =           [facility]

[facility]



               3-10           [facility]                0.5 mL,           l



               23:20:                               Injection,           [facility]



               00                                 IV, Once,           



                                                  first dose           



                                                  03/10/16           



                                                  17:20:00           



                                                  CST, stop           



                                                  date           



                                                  03/10/16           



                                                  17:20:00           



                                                  CST            

 

     ondansetron      0      No   Deuce                4 mg = 2           

[facility]



               3-10           [facility]                mL,            l



               23:03:                               Injection,           [place]



               00                                 IV, Once,           



                                                  first dose           



                                                  03/10/16           



                                                  17:03:00           



                                                  CST, stop           



                                                  date           



                                                  03/10/16           



                                                  17:03:00           



                                                  CST            

 

     ondansetron      0      No   Deuce                4 mg = 2           

[facility]



               3-10           [facility]                mL,            l



               23:03:                               Injection,           [place]



               00                                 IV, Once,           



                                                  first dose           



                                                  03/10/16           



                                                  17:03:00           



                                                  CST, stop           



                                                  date           



                                                  03/10/16           



                                                  17:03:00           



                                                  CST            

 

     fentaNYL      0      No   Deuce                25 mcg =           [facility]

[facility]



               3-10           [facility]                0.5 mL,           l



               23:00:                               Injection,           [place]



               00                                 IV, Once,           



                                                  first dose           



                                                  03/10/16           



                                                  17:00:00           



                                                  CST, stop           



                                                  date           



                                                  03/10/16           



                                                  17:00:00           



                                                  CST            

 

     fentaNYL      -0      No   Deuce                25 mcg =           [facility]

[facility]



               3-10           [facility]                0.5 mL,           l



               23:00:                               Injection,           [place]



               00                                 IV, Once,           



                                                  first dose           



                                                  03/10/16           



                                                  17:00:00           



                                                  CST, stop           



                                                  date           



                                                  03/10/16           



                                                  17:00:00           



                                                  CST            

 

     fentaNYL      -0      No   Deuce                25 mcg =           [facility]

[facility]



               3-10           [facility]                0.5 mL,           l



               22:48:                               Injection,           [place]



               00                                 IV, Once,           



                                                  first dose           



                                                  03/10/16           



                                                  16:48:00           



                                                  CST, stop           



                                                  date           



                                                  03/10/16           



                                                  16:48:00           



                                                  CST            

 

     fentaNYL            No   Deuce                25 mcg =           [facility]

[facility]



               3-10           [facility]                0.5 mL,           l



               22:48:                               Injection,           [place]



               00                                 IV, Once,           



                                                  first dose           



                                                  03/10/16           



                                                  16:48:00           



                                                  CST, stop           



                                                  date           



                                                  03/10/16           



                                                  16:48:00           



                                                  CST            

 

     fentaNYL      -0      No   Deuce                25 mcg =           [facility]

[facility]



               3-10           [place]                0.5 mL,           l



               22:37:                               Injection,           [place]



               00                                 IV, Once,           



                                                  first dose           



                                                  03/10/16           



                                                  16:37:00           



                                                  CST, stop           



                                                  date           



                                                  03/10/16           



                                                  16:37:00           



                                                  CST            

 

     fentaNYL      0      No   Deuce                25 mcg =           [facility]

[facility]



               3-10           [facility]                0.5 mL,           l



               22:37:                               Injection,           [place]



               00                                 IV, Once,           



                                                  first dose           



                                                  03/10/16           



                                                  16:37:00           



                                                  CST, stop           



                                                  date           



                                                  03/10/16           



                                                  16:37:00           



                                                  CST            

 

     dexamethaso      -0      No   Deuce                8 mg = 2           

[facility]



     ne        3-10           [place]                mL,            l



               22:23:                               Injection,           [place]



               00                                 IV, Once,           



                                                  first dose           



                                                  03/10/16           



                                                  16:23:00           



                                                  CST, stop           



                                                  date           



                                                  03/10/16           



                                                  16:23:00           



                                                  CST            

 

     dexamethaso      -0      No   Deuce                8 mg = 2           

[facility]



     ne        3-10           [place]                mL,            l



               22:23:                               Injection,           [place]



               00                                 IV, Once,           



                                                  first dose           



                                                  03/10/16           



                                                  16:23:00           



                                                  CST, stop           



                                                  date           



                                                  03/10/16           



                                                  16:23:00           



                                                  CST            

 

     Misc      -0      No   Deuce                1,000 mL,           [facility]

a



     Medication      3-10           [place]                Soln-IV,           l



               22:21:                               IV, Once,           [place]



               00                                 first dose           



                                                  03/10/16           



                                                  16:21:00           



                                                  CST, stop           



                                                  date           



                                                  03/10/16           



                                                  16:21:00           



                                                  CST            

 

     Misc      -0      No   Deuce                1,000 mL,           [facility]

a



     Medication      3-10           [place]                Soln-IV,           l



               22:21:                               IV, Once,           [place]



               00                                 first dose           



                                                  03/10/16           



                                                  16:21:00           



                                                  CST, stop           



                                                  date           



                                                  03/10/16           



                                                  16:21:00           



                                                  CST            

 

     clindamycin            Yes  Deuce                928.125           [facility]

[facility]



               3-10           [facility]                mg,            l



               22:18:                               Soln-IV,           [place]



               00                                 IV, Once,           



                                                  first dose           



                                                  03/10/16           



                                                  16:18:00           



                                                  CST, stop           



                                                  date           



                                                  03/10/16           



                                                  16:18:00           



                                                  CST            

 

     clindamycin            Yes  Deuce                928.125           [facility]

[facility]



               3-10           [place]                mg,            l



               22:18:                               Soln-IV,           [place]



               00                                 IV, Once,           



                                                  first dose           



                                                  03/10/16           



                                                  16:18:00           



                                                  CST, stop           



                                                  date           



                                                  03/10/16           



                                                  16:18:00           



                                                  CST            

 

     fentaNYL            No   Deuce                25 mcg =           [facility]

[facility]



               3-10           [facility]                0.5 mL,           l



               22:05:                               Injection,           [place]



               00                                 IV, Once,           



                                                  first dose           



                                                  03/10/16           



                                                  16:05:00           



                                                  CST, stop           



                                                  date           



                                                  03/10/16           



                                                  16:05:00           



                                                  CST            

 

     midazolam            No   Deuce                0.5 mg =           [facility]

[facility]



               3-10           [facility]                0.5 mL,           l



               22:05:                               Injection,           [place]



               00                                 IV, Once,           



                                                  first dose           



                                                  03/10/16           



                                                  16:05:00           



                                                  CST, stop           



                                                  date           



                                                  03/10/16           



                                                  16:05:00           



                                                  CST            

 

     fentaNYL            No   Deuce                25 mcg =           [facility]

[facility]



               3-10           [facility]                0.5 mL,           l



               22:05:                               Injection,           [place]



               00                                 IV, Once,           



                                                  first dose           



                                                  03/10/16           



                                                  16:05:00           



                                                  CST, stop           



                                                  date           



                                                  03/10/16           



                                                  16:05:00           



                                                  CST            

 

     midazolam            No   Deuce                0.5 mg =           [facility]

[facility]



               3-10           [place]                0.5 mL,           l



               22:05:                               Injection,           [place]



               00                                 IV, Once,           



                                                  first dose           



                                                  03/10/16           



                                                  16:05:00           



                                                  CST, stop           



                                                  date           



                                                  03/10/16           



                                                  16:05:00           



                                                  CST            

 

     lidocaine      -0      No   Deuce                3 mL,           [facility]

[facility]



               3-10           [facility]                Injection,           l



               21:58:                               IV, Once,           [facility]



               00                                 first dose           



                                                  03/10/16           



                                                  15:58:00           



                                                  CST, stop           



                                                  date           



                                                  03/10/16           



                                                  15:58:00           



                                                  CST            

 

     propofol            No   Deuce                120 mg =           [facility]

[facility]



               3-10           [facility]                12 mL,           l



               21:58:                               Emulsion,           [place]



               00                                 IV, Once,           



                                                  first dose           



                                                  03/10/16           



                                                  15:58:00           



                                                  CST, stop           



                                                  date           



                                                  03/10/16           



                                                  15:58:00           



                                                  CST            

 

     lidocaine            No   Deuce                3 mL,           [facility]

[facility]



               3-10           [place]                Injection,           l



               21:58:                               IV, Once,           [place]                                 first dose           



                                                  03/10/16           



                                                  15:58:00           



                                                  CST, stop           



                                                  date           



                                                  03/10/16           



                                                  15:58:00           



                                                  CST            

 

     propofol            No   Deuce                120 mg =           [facility]

[facility]



               3-10           [place]                12 mL,           l



               21:58:                               Emulsion,           [place]



               00                                 IV, Once,           



                                                  first dose           



                                                  03/10/16           



                                                  15:58:00           



                                                  CST, stop           



                                                  date           



                                                  03/10/16           



                                                  15:58:00           



                                                  CST            

 

     midazolam            No   Deuce                0.5 mg =           [facility]

[facility]



               3-10           [place]                0.5 mL,           l



               21:50:                               Injection,           [place]



               00                                 IV, Once,           



                                                  first dose           



                                                  03/10/16           



                                                  15:50:00           



                                                  CST, stop           



                                                  date           



                                                  03/10/16           



                                                  15:50:00           



                                                  CST            

 

     fentaNYL            No   Deuce                25 mcg =           [facility]

[facility]



               3-10           [facility]                0.5 mL,           l



               21:50:                               Injection,           [place]



               00                                 IV, Once,           



                                                  first dose           



                                                  03/10/16           



                                                  15:50:00           



                                                  CST, stop           



                                                  date           



                                                  03/10/16           



                                                  15:50:00           



                                                  CST            

 

     midazolam            No   Deuce                0.5 mg =           [facility]

[facility]



               3-10           Wheat                0.5 mL,           l



               21:50:                               Injection,           [place]



               00                                 IV, Once,           



                                                  first dose           



                                                  03/10/16           



                                                  15:50:00           



                                                  CST, stop           



                                                  date           



                                                  03/10/16           



                                                  15:50:00           



                                                  CST            

 

     fentaNYL            No   Deuce                25 mcg =           [facility]

[facility]



               3-10           Wheat                0.5 mL,           l



               21:50:                               Injection,           [facility]



               00                                 IV, Once,           



                                                  first dose           



                                                  03/10/16           



                                                  15:50:00           



                                                  CST, stop           



                                                  date           



                                                  03/10/16           



                                                  15:50:00           



                                                  CST            

 

     midazolam      -0      No   Deuce                0.5 mg =           [facility]

[facility]



               3-10           Wheat                0.5 mL,           l



               21:10:                               Injection,           [facility]



               00                                 IV, Once,           



                                                  first dose           



                                                  03/10/16           



                                                  15:10:00           



                                                  CST, stop           



                                                  date           



                                                  03/10/16           



                                                  15:10:00           



                                                  CST            

 

     fentaNYL      -0      No   Deuce                25 mcg =           [facility]

[facility]



               3-10           Wheat                0.5 mL,           l



               21:10:                               Injection,           [place]



               00                                 IV, Once,           



                                                  first dose           



                                                  03/10/16           



                                                  15:10:00           



                                                  CST, stop           



                                                  date           



                                                  03/10/16           



                                                  15:10:00           



                                                  CST            

 

     midazolam      -      No   Deuce                0.5 mg =           [facility]

[facility]



               3-10           Wheat                0.5 mL,           l



               21:10:                               Injection,           [facility]



               00                                 IV, Once,           



                                                  first dose           



                                                  03/10/16           



                                                  15:10:00           



                                                  CST, stop           



                                                  date           



                                                  03/10/16           



                                                  15:10:00           



                                                  CST            

 

     fentaNYL      -      No   Deuce                25 mcg =           [facility]

[facility]



               3-10           Wheat                0.5 mL,           l



               21:10:                               Injection,           [facility]



               00                                 IV, Once,           



                                                  first dose           



                                                  03/10/16           



                                                  15:10:00           



                                                  CST, stop           



                                                  date           



                                                  03/10/16           



                                                  15:10:00           



                                                  CST            

 

     fentaNYL      -0      No   Deuce                25 mcg =           [facility]

[facility]



               3-10           Wheat                0.5 mL,           l



               21:05:                               Injection,           [place]



               00                                 IV, Once,           



                                                  first dose           



                                                  03/10/16           



                                                  15:05:00           



                                                  CST, stop           



                                                  date           



                                                  03/10/16           



                                                  15:05:00           



                                                  CST            

 

     midazolam      -0      No   Deuce                0.5 mg =           [facility]

[facility]



               3-10           Wheat                0.5 mL,           l



               21:05:                               Injection,           [place]



               00                                 IV, Once,           



                                                  first dose           



                                                  03/10/16           



                                                  15:05:00           



                                                  CST, stop           



                                                  date           



                                                  03/10/16           



                                                  15:05:00           



                                                  CST            

 

     fentaNYL      -0      No   Deuce                25 mcg =           [facility]

[facility]



               3-10           Wheat                0.5 mL,           l



               21:05:                               Injection,           [facility]



               00                                 IV, Once,           



                                                  first dose           



                                                  03/10/16           



                                                  15:05:00           



                                                  CST, stop           



                                                  date           



                                                  03/10/16           



                                                  15:05:00           



                                                  CST            

 

     midazolam      -0      No   Deuce                0.5 mg =           [facility]

[facility]



               3-10           Wheat                0.5 mL,           l



               21:05:                               Injection,           [place]



               00                                 IV, Once,           



                                                  first dose           



                                                  03/10/16           



                                                  15:05:00           



                                                  CST, stop           



                                                  date           



                                                  03/10/16           



                                                  15:05:00           



                                                  CST            

 

     clindamycin            No   Yoan                900 mg, IV        

   [facility]



               3-10           Lei                Piggyback,           l



               20:00:                               Once,           [place]



               00                                 infuse           



                                                  over 30           



                                                  minutes,           



                                                  first dose           



                                                  03/10/16           



                                                  14:00:00           



                                                  CST, stop           



                                                  date           



                                                  03/10/16           



                                                  14:00:00           



                                                  CST,           



                                                  Prophylaxi           



                                                  s              

 

     clindamycin            No   Yoan                900 mg, IV        

   [facility]



               3-10           Lei                Piggyback,           l



               20:00:                               Once,           [place]



               00                                 infuse           



                                                  over 30           



                                                  minutes,           



                                                  first dose           



                                                  03/10/16           



                                                  14:00:00           



                                                  CST, stop           



                                                  date           



                                                  03/10/16           



                                                  14:00:00           



                                                  CST,           



                                                  Prophylaxi           



                                                  s              

 

     LR 1,000 mL            No   Ghassan K                1,000 mL,          

 [facility]



               3-10           Chun                IV, 30           l



               19:27:                               mL/hr,           [facility]



               00                                 start date           



                                                  03/10/16           



                                                  13:27:00           



                                                  CST            

 

     Lidocaine            No   Ghassan K                0.2 mL,           [facility]

[facility]



     2% 0.2 mL      3-10           Chun                Injection,           l



     IV Start      19:27:                               Subcutaneo           [facility]

[facility]



     [[place]]      00                                 us, Once           



                                                  PRN for           



                                                  other (see           



                                                  comment),           



                                                  first dose           



                                                  03/10/16           



                                                  13:27:00           



                                                  CST            

 

     LR 1,000 mL            No   Ghassan K                1,000 mL,          

 [facility]



               3-10           Chun                IV, 30           l



               19:27:                               mL/hr,           [facility]



               00                                 start date           



                                                  03/10/16           



                                                  13:27:00           



                                                  CST            

 

     Lidocaine            No   Ghassan K                0.2 mL,           [facility]

[facility]



     2% 0.2 mL      3-10           Chun                Injection,           l



     IV Start      19:27:                               Subcutaneo           [facility]

[facility]



     [[place]]      00                                 us, Once           



                                                  PRN for           



                                                  other (see           



                                                  comment),           



                                                  first dose           



                                                  03/10/16           



                                                  13:27:00           



                                                  CST            

 

     Seroquel            Yes                      100 mg,           [facility]

a



               3-09                               Oral,           l



               14:40:                               Daily, 0           [place]



               00                                 Refill(s),           



                                                  migraines           

 

     acetaminoph            Yes                      1 tabs,           [facility]

[facility]



     en-HYDROcod      3-09                               Oral,           l



     one 325      14:40:                               q6hr, 0           [facility]



     mg-5 mg      00                                 Refill(s),           



     oral tablet                                         pain           

 

     traMADol 50            Yes                      50 mg = 1           [facility]

[facility]



     mg oral      3-09                               tabs,           l



     tablet      14:40:                               Oral,           [facility]



               00                                 q4hr, 0           



                                                  Refill(s),           



                                                  pain           

 

     amLODIPine-            Yes                      1 tabs,           [facility]

[facility]



     atorvastati      3-09                               Oral, qHS,           l



     n 5 mg-40      14:40:                               0              [place]



     mg oral      00                                 Refill(s),           



     tablet                                         cholestero           



                                                  l/hyperten           



                                                  alexx           

 

     Osteo      -      Yes                      Oral,           [facility]



     Bi-Flex      3-                               Daily, 0           l



               14:40:                               Refill(s),           [facility]



               00                                 supplement           

 

     Nature's            Yes                      1,000 mg =           [facility]

[facility]



     Bounty Red      3-09                               2 caps,           l



     Krill Oil      14:40:                               Oral, BID,           [facility]

[facility]



     500 mg oral      00                                 0              



     capsule                                         Refill(s),           



                                                  supplement           

 

     aspirin 81            Yes                      81 mg = 1           [facility]

[facility]



     mg oral      3-09                               tabs,           l



     tablet      14:40:                               Oral,           [facility]



               00                                 Daily, 0           



                                                  Refill(s),           



                                                  supplement           

 

     Axert 12.5            Yes                      12.5 mg =           [facility]

[facility]



     mg oral      3-09                               1 tabs,           l



     tablet      14:40:                               Oral,           [facility]



               00                                 Once, PRN           



                                                  for            



                                                  migraine           



                                                  headache,           



                                                  may repeat           



                                                  dose once           



                                                  in 2           



                                                  hours, # 6           



                                                  tabs, 0           



                                                  Refill(s),           



                                                  migrainesm           



                                                  ay repeat           



                                                  dose once           



                                                  in 2 hours           

 

     Wellbutrin            Yes                      300 mg,           [facility]

[facility]



     XL        3-09                               Oral,           l



               14:40:                               q24hr, 0           [place]



               00                                 Refill(s),           



                                                  migraines           

 

     Seroquel            Yes                      100 mg,           [facility]

a



               3-09                               Oral,           l



               14:40:                               Daily, 0           [facility]



               00                                 Refill(s),           



                                                  migraines           

 

     acetaminoph            Yes                      1 tabs,           [facility]

[facility]



     en-HYDROcod      3-                               Oral,           l



     one 325      14:40:                               q6hr, 0           [place]



     mg-5 mg      00                                 Refill(s),           



     oral tablet                                         pain           

 

     traMADol 50            Yes                      50 mg = 1           [facility]

[facility]



     mg oral      3-09                               tabs,           l



     tablet      14:40:                               Oral,           [place]



               00                                 q4hr, 0           



                                                  Refill(s),           



                                                  pain           

 

     amLODIPine-            Yes                      1 tabs,           [facility]

[facility]



     atorvastati      3-09                               Oral, qHS,           l



     n 5 mg-40      14:40:                               0              [facility]



     mg oral      00                                 Refill(s),           



     tablet                                         cholestero           



                                                  l/hyperten           



                                                  alexx           

 

     Osteo            Yes                      Oral,           [facility]



     Bi-Flex      3-                               Daily, 0           l



               14:40:                               Refill(s),           [facility]



               00                                 supplement           

 

     Nature's            Yes                      1,000 mg =           [facility]

[facility]



     Bounty Red      3-09                               2 caps,           l



     Krill Oil      14:40:                               Oral, BID,           [facility]

[facility]



     500 mg oral      00                                 0              



     capsule                                         Refill(s),           



                                                  supplement           

 

     aspirin 81            Yes                      81 mg = 1           [facility]

[facility]



     mg oral      3-09                               tabs,           l



     tablet      14:40:                               Oral,           [place]



               00                                 Daily, 0           



                                                  Refill(s),           



                                                  supplement           

 

     Axert 12.5            Yes                      12.5 mg =           [facility]

[facility]



     mg oral      3-09                               1 tabs,           l



     tablet      14:40:                               Oral,           [place]



               00                                 Once, PRN           



                                                  for            



                                                  migraine           



                                                  headache,           



                                                  may repeat           



                                                  dose once           



                                                  in 2           



                                                  hours, # 6           



                                                  tabs, 0           



                                                  Refill(s),           



                                                  migrainesm           



                                                  ay repeat           



                                                  dose once           



                                                  in 2 hours           

 

     Wellbutrin            Yes                      300 mg,           [facility]

[facility]



     XL        3-09                               Oral,           l



               14:40:                               q24hr, 0           [facility]



               00                                 Refill(s),           



                                                  migraines           







Immunizations







           Ordered Immunization Filled Immunization Date       Status     Commen

ts   Source



           Name       Name                                        

 

           Hx influenza            2019-10-23 Completed             [facility]



           vaccine-unspecified<            00:00:00                         [facility]

[facility]



           sup>1</sup>                                             

 

           Hx influenza            2019-10-23 Completed             [facility]



           vaccine-unspecified<            00:00:00                         [facility]

[facility]



           sup>1</sup>                                             

 

           pneumococcal            2019 Completed             [facility]



           23-valent             00:00:00                         [place]



           vaccine<sup>3</sup>                                             

 

           pneumococcal            2019 Completed             [facility]



           [facility]valent             00:00:00                         [place]



           vaccine<sup>3</sup>                                             

 

           Hx influenza            2011-10-25 Completed             [facility]



           vaccine-unspecified<            14:42:42                         [facility]

daryl



           sup>1</sup>                                             

 

           Hx influenza            2011-10-25 Completed             [facility]



           vaccine-unspecified<            14:42:42                         [facility]

daryl



           sup>2</sup>                                             

 

           Hx influenza            2011-10-25 Completed             [facility]



           vaccine-unspecified<            14:42:42                         [facility]

daryl



           sup>1</sup>                                             

 

           Hx influenza            2011-10-25 Completed             [facility]



           vaccine-unspecified<            14:42:42                         [facility]

daryl



           sup>2</sup>                                             







Vital Signs







             Vital Name   Observation Time Observation Value Comments     Source

 

             Temperature Oral (F) 2022 19:52:00 97.6 F                    

[facility] [place]

 

             Height       2022 19:52:00 170.18 cm                 [facility]

 [place]

 

             Weight       2022 19:52:00                           [facility]

 [place]

 

             BMI Calculated 2022 19:52:00                           [facility]

al [place]

 

             Heart Rate   2021 21:23:00                           [facility]

 [place]

 

             Systolic (mm Hg) 2021 21:23:00                           [facility]

[facility] [place]

 

             Diastolic (mm Hg) 2021 21:23:00                           [facility]

[facility] [place]

 

             Height       2021 21:23:00 165.1 cm                  [facility]

 [place]

 

             Weight       2021 21:23:00                           [facility]

 [place]

 

             BMI Calculated 2021 21:23:00                           [facility]

al [place]

 

             Heart Rate   2021 20:12:00                           [facility]

 [place]

 

             Heart Rate   2021 20:08:00                           [facility]

 [place]

 

             Systolic (mm Hg) 2021 20:08:00                           [facility]

[facility] [place]

 

             Diastolic (mm Hg) 2021 20:08:00                           [facility]

[facility] [place]

 

             Height       2021 20:08:00 165.1 cm                  [facility]

 [place]

 

             Weight       2021 20:08:00                           [facility]

 [place]

 

             BMI Calculated 2021 20:08:00                           [facility]

al [place]

 

             Systolic (mm Hg) 2020 15:59:00                           [facility]

[facility] [place]

 

             Diastolic (mm Hg) 2020 15:59:00                           [facility]

[facility] [place]

 

             Heart Rate   2020 15:59:00                           [facility]

 [place]

 

             Height       2020 15:59:00 165.1 cm                  [facility]

 [place]

 

             Weight       2020 15:59:00                           [facility]

 [place]

 

             BMI Calculated 2020 15:59:00                           [facility]

al [place]

 

             Systolic (mm Hg) 2020 20:42:00                           [facility]

[facility] [place]

 

             Diastolic (mm Hg) 2020 20:42:00                           [facility]

[facility] [place]

 

             Heart Rate   2020 20:42:00                           [facility]

 [place]

 

             Temperature Oral (F) 2020 20:42:00 98.2 F                    

[facility] [place]

 

             Height       2020 20:42:00 170.18 cm                 [facility]

 [place]

 

             Weight       2020 20:42:00                           [facility]

 [place]

 

             BMI Calculated 2020 20:42:00                           [facility]

al [place]

 

             Systolic (mm Hg) 2019 21:53:00                           [facility]

[facility] [place]

 

             Diastolic (mm Hg) 2019 21:53:00                           [facility]

[facility] [place]

 

             Heart Rate   2019 21:53:00                           [facility]

 [place]

 

             Temperature Oral (F) 2019 21:53:00 97.9 F                    

[facility] [place]

 

             Height       2019 21:53:00 165.1 cm                  [facility]

 [place]

 

             Weight       2019 21:53:00                           [facility]

 [place]

 

             BMI Calculated 2019 21:53:00                           [facility]

[facility] [place]

 

             Height       2019-10-25 14:54:00 165.1 cm                  [facility]

 [place]

 

             Weight       2019-10-25 14:54:00                           [facility]

 [place]

 

             BMI Calculated 2019-10-25 14:54:00                           [facility]

al [place]

 

             Systolic (mm Hg) 2019-10-25 14:54:00                           [facility]

[facility] [place]

 

             Diastolic (mm Hg) 2019-10-25 14:54:00                           [facility]

[facility] [place]

 

             Heart Rate   2019-10-25 14:54:00                           [facility]

 [place]

 

             Temperature Oral (F) 2019-10-25 14:54:00 97.6 F                    

[facility] [place]

 

             Systolic (mm Hg) 2019-10-10 15:37:00                           [facility]

[facility] [place]

 

             Diastolic (mm Hg) 2019-10-10 15:37:00                           [facility]

[facility] [place]

 

             Heart Rate   2019-10-10 15:37:00                           [facility]

 [place]

 

             Temperature Oral (F) 2019-10-10 15:37:00 98.2 F                    

[facility] [place]

 

             Height       2019-10-10 15:37:00 165.1 cm                  [facility]

 [place]

 

             Weight       2019-10-10 15:37:00                           [facility]

 [place]

 

             BMI Calculated 2019-10-10 15:37:00                           [facility]

[facility] [place]

 

             Weight       2019 15:18:00                           [facility]

 [place]

 

             BMI Calculated 2019 15:18:00                           [facility]

[facility] [place]

 

             Height       2019 15:18:00 165.1 cm                  [facility]

 [place]

 

             Temperature Oral (F) 2019 15:18:00 98.4 F                    

[facility] [place]

 

             Heart Rate   2019 15:18:00                           [facility]

 [place]

 

             Systolic (mm Hg) 2019 15:18:00                           [facility]

[facility] [place]

 

             Diastolic (mm Hg) 2019 15:18:00                           [facility]

[facility] [place]

 

             Height       2019 19:16:00 165.1 cm                  [facility]

 [place]

 

             BMI Calculated 2019 19:16:00                           [facility]

[facility] [place]

 

             Weight       2019 19:16:00                           [facility]

 [place]

 

             Heart Rate   2019 19:16:00                           [facility]

 [place]

 

             Systolic (mm Hg) 2019 19:16:00                           [facility]

[facility] [place]

 

             Diastolic (mm Hg) 2019 19:16:00                           [facility]

[facility] [place]

 

             Height       2019 14:26:00 167.01 cm                 [facility]

 [place]

 

             BMI Calculated 2019 14:26:00                           [facility]

al [place]

 

             Weight       2019 14:26:00                           [facility]

 [place]

 

             Heart Rate   2019 14:26:00                           [facility]

 [place]

 

             Temperature Oral (F) 2019 14:26:00 97.9 F                    

[facility] [place]

 

             Systolic (mm Hg) 2019 14:26:00                           [facility]

[facility] [place]

 

             Diastolic (mm Hg) 2019 14:26:00                           [facility]

[facility] [place]

 

             Systolic (mm Hg) 2018 18:33:00                           [facility]

[facility] [place]

 

             Diastolic (mm Hg) 2018 18:33:00                           [facility]

[facility] [place]

 

             Heart Rate   2018 18:33:00                           [facility]

 [place]

 

             Weight       2018 18:33:00                           [facility]

 [place]

 

             BMI Calculated 2018 18:31:00                           [facility]

al [place]

 

             Weight       2018 18:31:00                           [facility]

 [place]

 

             Systolic (mm Hg) 2018 18:31:00                           [facility]

[facility] [place]

 

             Diastolic (mm Hg) 2018 18:31:00                           [facility]

[facility] [place]

 

             Height       2018 18:31:00 170.18 cm                 [facility]

 [place]

 

             Temperature Oral (F) 2018 18:31:00 98.3 F                    

[facility] [place]

 

             Heart Rate   2018 18:31:00                           [facility]

 [place]

 

             Weight       2018 16:14:00                           [facility]

 [place]

 

             Height       2018 16:14:00 170.18 cm                 [facility]

 [place]

 

             BMI Calculated 2018 16:14:00                           [facility]

[facility] [place]

 

             Heart Rate   2018 16:14:00                           [facility]

 [place]

 

             Systolic (mm Hg) 2018 16:14:00                           [facility]

[facility] [place]

 

             Diastolic (mm Hg) 2018 16:14:00                           [facility]

[facility] [place]

 

             BMI Calculated 2018 15:06:00                           [facility]

al [place]

 

             Height       2018 15:06:00 170.18 cm                 [facility]

 [place]

 

             Weight       2018 15:06:00                           [facility]

 [place]

 

             Systolic (mm Hg) 2018 15:06:00                           [facility]

[facility] [place]

 

             Diastolic (mm Hg) 2018 15:06:00                           [facility]

[facility] [place]

 

             Heart Rate   2018 15:06:00                           [facility]

 [place]

 

             Temperature Oral (F) 2018 15:06:00 97.9 F                    

[facility] [place]

 

             Weight       2017 16:17:00                           [facility]

 [place]

 

             Height       2017 16:17:00 170.18 cm                 [facility]

 [place]

 

             BMI Calculated 2017 16:17:00                           [facility]

al [place]

 

             Respitory Rate 2016 01:25:00                           [facility]

al [place]

 

             Systolic (mm Hg) 2016 00:50:00                           [facility]

[facility] [place]

 

             Respitory Rate 2016 00:50:00                           [facility]

al [place]

 

             Heart Rate   2016 00:50:00                           [facility]

 [place]

 

             Systolic (mm Hg) 2016 00:40:00                           [facility]

[facility] [place]

 

             Respitory Rate 2016 00:40:00                           [facility]

al [place]

 

             Heart Rate   2016 00:40:00                           [facility]

 [place]

 

             Heart Rate   2016 00:30:00                           [facility]

 [place]

 

             Systolic (mm Hg) 2016 00:30:00                           [facility]

[facility] [place]

 

             Temperature Oral (F) 2016-03-10 23:50:00 37.1 Sherlyn                  

[facility] [place]

 

             Height       2016-03-10 19:23:00 169 cm                    [facility]

 [place]

 

             Weight       2016-03-10 19:23:00                           [facility]

 [place]

 

             Temperature Oral (F) 2016-03-10 19:23:00 36.6 Sherlyn                  

[facility] [place]

 

             Height       2016 14:13:00 170 cm                    [facility]

 [place]

 

             Weight       2016 14:13:00                           [facility]

 [place]







Procedures







                Procedure       Date / Time     Performing Clinician Source



                                Performed                       

 

                Diabetic retinal eye 2020 06:00:00                 [facility]

l [place]



                exam<sup>1</sup>                                 

 

                Mammogram       2017-07-15 05:00:00                 [facility]

[facility]

 

                Colonoscopy<sup>2</sup> 2017 06:00:00                 [facility]

[facility] [place]

 

                OPEN REDUCTION INTERNAL 2016-03-10 22:13:00 Shandra Leiew   [facility]

[facility] [place]



                FIXATION RADIUS                                 



                INTRA-ARTICULAR W/3                                 



                FRAGMENTS 28486                                 



                (Right)<sup>1</sup>                                 

 

                Repair of wrist<sup>3</sup> 2016-03-10 06:00:00                 

[facility] [place]

 

                skin cancer removed from 2012 00:00:00                 [facility]

[facility] [place]



                arm                                             

 

                Skin cancer of                                  [facility] [place]



                arm<sup>4</sup>                                 

 

                Cholecystectomy                                 [facility] [place]

 

                Procedure<sup>2</sup>                                 [facility]

[facility]

 

                Tubal ligation                                  [facility]

 

                Eye operation                                   [facility] [place]

 

                Biopsy of breast                                 [facility] Donny

n

 

                bilateral radial kerototomy                                 [facility]

[facility] [place]

 

                bilateral tubal ligation                                 [facility]

l [place]

 

                colonoscopy                                     [facility]







Encounters







        Start   End     Encounter Admission Attending Care    Care    Encounter 

Source



        Date/Time Date/Time Type    Type    Clinicians Facility Department ID   

   

 

        2022         Inpatient RUFINA Cuellar, KHANHPM   ENDO    [id number]

8 [facility]



        09:00:00                         [address]

 

        2022-10-05 2022-10-05 Outpatient         [facility]     [id number]

[address]



        00:00:00 00:00:00                 ADRIEL                 622     [address]

di



                                                                        

 

        2022-10-05 2022-10-05 [facility]     [id number]

[address]



        00:00:00 00:00:00                 ADRIEL                 62Toni     Meth[place]

di



                                                                        st

 

        2022-10-05 2022-10-05 [facility]     [id number]

[address]



        00:00:00 00:00:00                 ADRIEL                 626     [address]

di



                                                                        st

 

        202220 Outpatient         EKERUO, [facility]     [id number]

[address]



        00:00:00 00:00:00                 DAYLIN                  502     [facility]

i



                                                                        

 

        2022 Outpatient         POLLY,  [facility]     [id number]

[address]



        00:00:00 00:00:00                 ADRIEL                 547     [address]

di



                                                                        

 

        2022 Inpatient         RAFAT, [facility]     [id number]

[id number]



        00:00:00 00:00:00                 ADIL                    904     [facility]

i



                                                                        

 

        2022 Outpatient KHANH MinayaCL   OUTD    [id number]

562 [facility]



        04:58:00 04:58:00                 Venancio                  89      [facility]

 

        2022 Outpatient CLEMENTE Minaya   OUTD    [id number]

090 [facility]



        06:37:00 06:37:00                 Venancio                  64      [facility]

 

        2022 Outpatient         [facility]     [facility]     [id number]

[phone]



        00:00:00 00:00:00                                         [id number]   [place]



                                                                        [facility]

 

        2022 Outpatient         LYON,  [place] [address]     [id number]

[address]



        00:00:00 00:00:00                 LORRAINE Christine     Method

i



                                                                        

 

        2022 Outpatient                 nullFlavo [facility]

[id number] [facility]



        21:20:00 04:59:59                         r       Medicine 56      l



                                                        Rudy Hines

n

 

        2022 Outpatient                 nullFlavo [facility]

[id number] [facility]



        21:20:00 04:59:59                         r       Medicine 56      l



                                                        Rudy Hines

n

 

        2022 Outpatient         Green,  [facility]    [id number]

365 



        16:20:00 23:59:59                 Charisse N                 56      

 

        2022 Outpatient                 [facility]    [facility]    [id number]

365 [facility]



        16:20:00 16:20:00                                         56      l



                                                                        [place]

 

        2022 Emergency         WILLARD,  [facility]     064     [id number]

[address]



        00:00:00 00:00:00                 ESTELITA                   503     [facility]

i



                                                                        st

 

        2022 Inpatient         JADIEL [facility]     064     [id number]

[address]



        00:00:00 00:00:00                 THU                   968     [facility]

i



                                                                        st

 

        [facility]    LA0

[id number] [facility]



        07:07:00 07:07:00                                         79      [facility]

 

        2022 [facility]   [id number]

[id number] [facility]



        07:07:00 07:07:00                                            [facility]

 

        2022 Phone                   nullFlavo [facility] 3467

[id number] [facility]



        15:25:04 04:59:59 Message                 r       Medicine 17      l



                                                        Rudy martinez

 

        2022 Phone                   nullFlavo [facility] 3467

[id number] [facility]



        15:25:04 04:59:59 Message                 r       Medicine 17      l



                                                        Rudy martinez

 

        2022 Outpatient                 [facility]    [id number]

355 



        10:25:04 23:59:59                                         17      

 

        2022 Emergency EM      Tenke, Hadley HCAPM   CT    [id number]

[id number] [facility]



        12:08:00 15:51:00                                         00      [facility]

 

        2022 Emergency EM      Tenke, Hadley [facility]CAPM   [facility]   [id number]

 [facility]



        12:08:00 15:51:00                                            [facility]

 

        2022 Out[facility]   [facility]    

[id number] [facility]



        07:10:00 07:10:00                 Clark                   92      [facility]

 

        2022 Ambulatory                 [facility]

[id number] [facility]



        15:15:00 15:15:00 Pre-Reg                 r       Medicine 54      l



                                                        Rudy martinez

 

        2022 Ambulatory                 [facility]

[id number] [facility]



        15:15:00 15:15:00 Pre-Reg                 r       Medicine 54      l



                                                        Rudy martinez

 

        2022 Outpatient                 [facility]    [id number]

365 [facility]



        10:15:00 10:15:00                                         54      l



                                                                        [place]

 

        2022 Outpatient         Peter,  [facility]    [id number]

365 



        10:15:00 10:15:00                 Charisse N                 54      

 

        2022 Outpatient                 [facility]    [facility]    [id number]

365 [facility]



        10:30:00 10:30:00                                         55      l



                                                                        [place]

 

        2022 Outpatient                 [facility]    [facility]    [id number]

365 [facility]



        10:30:00 10:30:00                                         55      l



                                                                        [place]

 

        2022 Outpatient         ANABEL, [facility]     [id number]

[address]



        00:00:00 00:00:00                 DAYLIN                  779     [facility]

i



                                                                        

 

        2022 Inpatient         SOLIPURASUSIE, [facility]     064     [id number]

[id number]



        00:00:00 00:00:00                 MELISSA                    492     [facility]

i



                                                                        

 

        2022 Phone                   [facility]Flavo [facility] 3467

[id number] [facility]



        17:07:02 05:59:59 Message                 r       Medicine 16      l



                                                        Rudy martinez

 

        2022 Phone                   [facility]Fl[facility] 3467

[id number] [facility]



        17:07:02 05:59:59 Message                 r       Medicine 16      l



                                                        Rudy martinez

 

        2022 Outpatient                 [facility]    [facility]    [id number]

355 



        11:07:02 23:59:59                                         16      

 

        2022 Outpatient         MED,  [facility]     [id number]

[address]



        00:00:00 00:00:00                 RAZIUDDIN                 169     [facility]

farhana



                                                                        

 

        2022 Outpatient         [facility],  [facility]     [id number]

[address]



        00:00:00 00:00:00                 RAZIUDDIN                 979     M[facility]

farhana



                                                                        st

 

        2022 Phone                   [facility]Fl[facility]67

[id number] [facility]



        19:33:53 05:59:59 Message                 r       Medicine 15      l



                                                        Rudy martinez

 

        2022 Phone                   nullFlavo [facility] 3467

[id number] [facility]



        19:33:53 05:59:59 Message                 r       Medicine 15      l



                                                        Grant         Donny martinez

 

        2022 Outpatient                 [facility]    [facility]    [id number]

355 



        13:33:53 23:59:59                                         15      

 

        2021 Phone                   nullFlavo [facility] 3467

[id number] [facility]



        19:38:03 05:59:59 Message                 r       Medicine 14      dennis Palacioberg         Donny martinez

 

        2021 Phone                   [facility]

[id number] Memoria



        19:38:03 05:59:59 Message                 r       Medicine 14      dennis martinez

 

        2021 Outpatient                 [facility]    [facility]    [id number]

355 



        13:38:03 23:59:59                                         14      

 

        2021 Outpatient                 [facility]

[id number] Memoria



        21:15:00 05:59:59                         r       Medicine 53      dennis martinez

 

        2021 Outpatient                 [facility]

[id number] Memoria



        21:15:00 05:59:59                         r       Medicine 53      dennis martinez

 

        2021 Outpatient         Green,  MG    MG    [id number]

365 



        15:15:00 23:59:59                 Charisse MARTINEZ                 53      

 

        2021 Outpatient                 [facility]    IE    [id number]

365 Memoria



        15:15:00 15:15:00                                         Rashel Mart

 

        2021 Outpatient         MED,  [facility]     [id number]

742 [place]



        00:00:00 00:00:00                 RAZIUDDIN                 834     Meth

farhana



                                                                        st

 

        2021 Between                 [facility]

[id number] Memoria



        14:18:24 14:18:24 Visit                   r       Medicine 62      dennis martinez

 

        2021 Between                 [facility]

[id number] Memoria



        14:18:24 14:18:24 Visit                   r       Medicine 62      dennis martinez

 

        2021 Outpatient                 [facility]    [facility]    [id number]

375 



        08:18:24 08:18:24                                         62      

 

        2021 Between                 [facility]

[id number] Memoria



        00:56:47 00:56:47 Visit                   r       Medicine 61      dennis Rosariosylvie martinez

 

        2021 Between                 [facility]

[id number] Memoria



        00:56:47 00:56:47 Visit                   r       Medicine 61      dennis Palacioberg         Donny martinez

 

        2021 Outpatient                 [facility]    [facility]    [id number]

375 



        18:56:47 18:56:47                                         61      

 

        2021 Outpatient                 [facility]

[id number] Memoria



        20:00:00 05:59:59                         r       Medicine 52      dennis Palacioberg         Donyn martinez

 

        2021 Outpatient                 [facility]

[id number] Memoria



        20:00:00 05:59:59                         r       Medicine 52      dennis Palacioberg         Donny martinez

 

        2021 Outpatient         Green,  [facility]    [facility]    [id number]

365 



        14:00:00 23:59:59                 Charisse MARTINEZ                 52      

 

        2021 Outpatient                 [facility]    [facility]    [id number]

365 Memoria



        14:00:00 14:00:00                                         52      dennis Mart

 

        2021 Phone                   [facility]

[id number] Memoria



        21:38:38 05:59:59 Message                 r       Medicine 13      dennis martinez

 

        2021 Phone                   [facility]

[id number] Memoria



        21:38:38 05:59:59 Message                 r       Medicine 13      dennis martinez

 

        2021 Outpatient                 [facility]    [facility]    [id number]

355 



        15:38:38 23:59:59                                         13      

 

        2021 Outpatient         EKERKAVITHA, [facility]     [id number]

744 [place]



        00:00:00 00:00:00                 DAYLIN Quinonez

i



                                                                        st

 

        2021-11-15 2021 Between                 [facility]

[id number] Memoria



        15:33:59 15:33:59 Visit                   r       Medicine 59      l



                                                        Rudy Hines

n

 

        2021-11-15 2021 Between                 [facility]

[id number] Memoria



        15:33:59 15:33:59 Visit                   r       Medicine 59      l



                                                        Rudy Hines

n

 

        2021-11-15 2021 Outpatient                 [facility]    [id number]

375 



        09:33:59 09:33:59                                         59      

 

        2021 2021-10-05 Inpatient         SOLIPURAM, [facility]     074     [id number]

[id number] [place]



        00:00:00 00:00:00                 MELISSA                    329     Method

i



                                                                        

 

        2021 Outpatient         OPPERMANN, [facility]     2100

[id number] [place]



        00:00:00 00:00:00                 JULIANNA                 104     Method

i



                                                                        

 

        2021 Outpatient         AHMED,  [facility]     [id number]

765 [place]



        00:00:00 00:00:00                 RAZIUDDIN                 273     Meth

farhana



                                                                        

 

        2021 Outpatient         EKERUO, [facility]     [id number]

411 [place]



        00:00:00 00:00:00                 DAYLIN                  787     Method

i



                                                                        

 

        2021 Outpatient         DAOURA, [facility]     [id number]

587 [place]



        00:00:00 00:00:00                 MAGY                 546     Method

i



                                                                        

 

        2021 Inpatient         KISHOREAN, [facility]     064     2100

[id number] [place]



        00:00:00 00:00:00                 MELVIN                   275     Method

i



                                                                        

 

        2021 Outpatient         AHMED,  [facility]     [id number]

068 [place]



        00:00:00 00:00:00                 RAZIUDDIN                 199     Meth

farhana



                                                                        

 

        2021 Outpatient         EKERUO, [facility]     021     [id number]

337 [place]



        00:00:00 00:00:00                 DAYLIN                  640     Method

i



                                                                        

 

        2021 Outpatient         EKERUO, [facility]     [id number]

412 [place]



        00:00:00 00:00:00                 DAYLIN                  435     Method

i



                                                                        

 

        2021 Outpatient         EKERUO, [facility]     [id number]

412 [place]



        00:00:00 00:00:00                 DAYLIN                  537     Method

i



                                                                        

 

        2021 Outpatient         EKERUO, [facility]     [id number]

029 [place]



        00:00:00 00:00:00                 DAYLIN                  709     Method

i



                                                                        

 

        2021 Inpatient         TONO, [facility]     064     [id number]

[id number] [place]



        00:00:00 00:00:00                 MELISSA                    685     Method

i



                                                                        

 

        2021 Outpatient         AHMED,  [facility]     [id number]

046 [place]



        00:00:00 00:00:00                 RAZIUDDIN                 101     Meth

farhana



                                                                        

 

        2021 Outpatient                 [facility]

[id number] Memoria



        01:00:00 04:59:00                         r       [place] 58      l



                                                        [place]         Meredith



 

        2021 Outpatient                 [facility]67

[id number] Memoria



        01:00:00 04:59:00                         r       [place] 58      l



                                                        [place]         Meredith



 

        2021 Outpatient         Ahmed,  [facility]    [facility]    [id number]

375 



        20:00:00 23:59:00                 Raziuddin                 58      

 

        2021 Outpatient         AHMED,  [facility]    PUL     [id number]   

 [facility]



        20:00:00 23:59:00                 RAZIUDDIN                         

 

        2021 Outpatient         AHMED,  [facility]     [id number]

900 [place]



        00:00:00 00:00:00                 RAZIUDDIN                 598     Meth

farhana



                                                                        

 

        2021-04-15 2021 Inpatient         ANAIS, [facility]     064     [id number]

[id number] [place]



        00:00:00 00:00:00                 MELVIN                   060     Method

i



                                                                        

 

        2021 Outpatient                 [facility]     [id number]

422 [place]



        00:00:00 00:00:00                                         470     Method

i



                                                                        

 

        2021 Outpatient                 [facility]

[id number] Memoria



        19:30:00 04:59:59                         r       Medicine 51      l



                                                        Rudy martinez

 

        2021 Outpatient                 [facility]

[id number] Memoria



        19:30:00 04:59:59                         r       Medicine 51      l



                                                        Rudy Hines

n

 

        2021 Outpatient         Green,  [facility]    [facility]    [id number]

365 



        14:30:00 23:59:59                 Charisse N                 51      

 

        2021 Outpatient                 [facility]    [facility]    [id number]

365 Memoria



        14:30:00 14:30:00                                         51      l



                                                                        [place]

 

        2021 Outpatient                 [facility]     [id number]

901 [place]



        00:00:00 00:00:00                                         398     Method

i



                                                                        

 

        2021 Between                 [facility]

[id number] Memoria



        13:38:03 13:38:03 Visit                   r       Medicine 57      l



                                                        Rudy martinez

 

        2021 Between                 [facility]

[id number] Memoria



        13:38:03 13:38:03 Visit                   r       Medicine 57      l



                                                        Rudy Hines

n

 

        2021 Outpatient                 MG    MG    [id number]

375 



        08:38:03 08:38:03                                         57      

 

        2021 Outpatient         DANIELLA  [facility]     [id number]

980 [place]



        00:00:00 00:00:00                 RAZIUDDIN                 554     Meth

farhana



                                                                        

 

        2021 [facility]     2100

[id number] [place]



        00:00:00 00:00:00                 MELVIN                   541     Method

i



                                                                        

 

        2021 [facility]         [facility]     Ayse     2100

[id number] [place]



        00:00:00 00:00:00                 MELVIN                   559     Method

i



                                                                        

 

        2020 Inpatient         ANAIS [facility]     012     2100

[id number] [place]



        00:00:00 00:00:00                 MELVIN                   271     Method

i



                                                                        

 

        2020 Outpatient                 [facility]

[id number] Memoria



        16:30:00 05:59:59                         r       Medicine 50      l



                                                        Rudy martinez

 

        2020 Outpatient                 [facility]

[id number] Memoria



        16:30:00 05:59:59                         r       Medicine 50      l



                                                        Rudy Hines

n

 

        2020 Outpatient         Peter  MG    [facility]    [id number]

365 



        10:30:00 23:59:59                 Charisse MARTINEZ                 50      

 

        2020 [facility]    [id number]

365 [facility]



        10:30:00 10:30:00                                         50      l



                                                                        [place]

 

        2020 Inpatient         SOLIPURAM, [facility]     012     [id number]

08229 [place]



        00:00:00 00:00:00                 MELISSA                    464     Method

i



                                                                        

 

        2020-10-23 2020 Inpatient         SOLIPURA, [facility]     012     [id number]

[id number] [place]



        00:00:00 00:00:00                 MELISSA                    557     Method

i



                                                                        st

 

        2020-10-23 2020-10-24 Outpt Diag                 [facility]    [id number]

[id number] Memoria



        18:10:00 04:59:00 Services                 r       Outpatient 06      l



                                                        Imaging         [place]



                                                        [place]         

 

        2020-10-23 2020-10-24 Outpt Diag                 [facility]    [id number]

50726 Memoria



        18:10:00 04:59:00 Services                 r       Outpatient 06      l



                                                        Imaging         [place]



                                                        [place]         

 

        2020-10-23 2020-10-23 Outpatient         Jazz, 29    29    [id number]

[id number] 



        13:10:00 23:59:00                 Abdi Rich      

 

        2020-10-21 2020-10-22 Between                 [facility]

[id number] Memoria



        17:58:19 17:58:19 Visit                   r       Medicine 56      l



                                                        Rudy Hines

n

 

        2020-10-21 2020-10-22 Between                 [facility]

[id number] Memoria



        17:58:19 17:58:19 Visit                   r       Medicine 56      l



                                                        Rudy Hines

n

 

        2020-10-21 2020-10-22 Outpatient                 MG    [facility]    [id number]

375 



        12:58:19 12:58:19                                         56      

 

        2020-10-13 2020-10-14 Outpatient                 [facility]

[id number] Memoria



        15:15:00 04:59:59                         r       Medicine 49      l



                                                        Rudy Hines

n

 

        2020-10-13 2020-10-14 Outpatient                 [facility]

[id number] Memoria



        15:15:00 04:59:59                         r       Medicine 49      l



                                                        Rudy Hines

n

 

        2020-10-13 2020-10-13 Outpatient         Peter,  [facility]    [id number]

365 



        10:15:00 23:59:59                 Charisse MICHELLE                 49      

 

        2020-10-13 2020-10-13 Outpatient                 [facility]    [facility]    [id number]

365 Memoria



        10:15:00 10:15:00                                         49      l



                                                                        [place]

 

        2020 Outpt Diag                 [facility]    [id number]

[id number] Memoria



        17:02:00 04:59:00 Services                 r       Outpatient 05      l



                                                        Imaging         [place]



                                                        [place]         

 

        2020 Outpt Diag                 [facility]    [id number]

34502 Memoria



        17:02:00 04:59:00 Services                 r       Outpatient 05      l



                                                        Imaging         [place]



                                                        [place]         

 

        2020 Outpatient         Jazz, 29    [facility]    [id number]

[id number] 



        12:02:00 23:59:00                 Abdi N                 05      

 

        2020 Ambulatory                 [facility]    [id number]

[id number] Memoria



        19:00:00 19:00:00 Pre-Reg                 r       Nephrology 46      l



                                                        Rudy Hines

n

 

        2020 Ambulatory                 [facility]    [id number]

[id number] Memoria



        19:00:00 19:00:00 Pre-Reg                 r       Nephrology 46      l



                                                        Rudy Hines

n

 

        2020 Outpatient                 [facility]    [facility]    [id number]

365 Memoria



        14:00:00 14:00:00                                         46      l



                                                                        [place]

 

        2020 Outpatient         [facility]    346

[id number] 



        14:00:00 14:00:00                 Daca,                   46      



                                        Gabriela J                         

 

        2020 Outpatient                 [facility]    [facility]    [id number]

365 Memoria



        11:15:00 11:15:00                                         47      l



                                                                        [place]

 

        2020 Outpatient                 [facility]    [facility]    [id number]

365 Memoria



        11:15:00 11:15:00                                         47      l



                                                                        [place]

 

        2020 Outpatient                 [facility]    [id number]

[id number] Memoria



        19:45:00 04:59:59                         r       Nephrology 48      l



                                                        Rudy Hines

n

 

        2020 Outpatient                 [facility]    [id number]

[id number] Memoria



        19:45:00 04:59:59                         r       Nephrology 48      dennis Hines

michelle

 

        2020 [facility]         [facility]    346

[id number] 



        14:45:00 23:59:59                 Dacalyssia,                   48      



                                        Gabriela EDUARDO                         

 

        2020 Outpatient                 [facility]    IE    [id number]

365 Memoria



        14:45:00 14:45:00                                         48      dennis



                                                                        [place]

 

        2020 Outpatient         KIMBERLIIN, [facility]     [id number]

[address] [place] [address]



        00:00:00 00:00:00                 EMILIA                    832     Method

i



                                                                        st

 

        2020 Phone                   [facility]    [id number]

55 Memoria



        16:17:50 04:59:59 Message                 r       Nephrology 12      dennis Hines

michelle

 

        2020 Phone                   [facility] MG    [id number]

55 Memoria



        16:17:50 04:59:59 Message                 r       Nephrology 12      dennis Hines

michelle

 

        2020 Outpatient                 [facility]    [facility]    [id number]

355 



        11:17:50 23:59:59                                         12      

 

        2020 Outpatient                 [facility]    [id number]

[id number] Memoria



        19:00:00 04:59:59                         r       Nephrology 41      dennis Hines

michelle

 

        2020 Outpatient                 [facility] MG    [id number]

[id number] Memoria



        19:00:00 04:59:59                         r       Nephrology 41      dennis Hines

michelle

 

        2020 Outpatient         [place] [facility]    MG    346

[id number] 



        14:00:00 23:59:59                 [place]aca,                   41      



                                        Gabriela EDUARDO                         

 

        2020 Outpatient                 [facility]    [facility]    [id number]

365 Memoria



        14:00:00 14:00:00                                         41      dennis



                                                                        [place]

 

        2020 Phone                   [facility] MG    [id number]

55 Memoria



        18:35:51 04:59:59 Message                 r       Nephrology 11      l



                                                        [place]         [place]

 

        2020 Phone                   [facility] MG    [id number]

55 Memoria



        18:35:51 04:59:59 Message                 r       Nephrology 11      dennis Mart

 

        2020 Outpatient                 [facility]    [facility]    [id number]

355 



        13:35:51 23:59:59                                         11      

 

        2020 Outpatient                 [facility]

[id number] Memoria



        15:15:00 04:59:59                         r       Medicine 45      l



                                                        Rudy Hines

n

 

        2020 Outpatient                 [facility]

[id number] Memoria



        15:15:00 04:59:59                         r       Medicine 45      l



                                                        Rudy Hines

n

 

        2020 Outpatient         Green,  [facility]    MG    [id number]

365 



        10:15:00 23:59:59                 Charisse N                 45      

 

        2020 Ambulatory                 [facility]

[id number] Memoria



        15:15:00 15:15:00 Pre-Reg                 r       Medicine 44      l



                                                        Rudy Hines

n

 

        2020 Ambulatory                 [facility]

[id number] Memoria



        15:15:00 15:15:00 Pre-Reg                 r       Medicine 44      l



                                                        Rudy Hines

n

 

        2020 Outpatient                 [facility]    [facility]    [id number]

365 Memoria



        10:15:00 10:15:00                                         45      dennis Mart

 

        2020 Outpatient                 [facility]    [facility]    [id number]

365 Memoria



        10:15:00 10:15:00                                         44      dennis



                                                                        aBrron

 

        2020 Outpatient         Green,  MG    [facility]    [id number]

365 



        10:15:00 10:15:00                 Charisse N                 44      

 

        2020 Phone                   [facility]    [id number]

55 Memoria



        13:23:32 04:59:59 Message                 r       Nephrology 10      dennis Hines

n

 

        2020 Phone                   [facility] MG    [id number]

55 Memoria



        13:23:32 04:59:59 Message                 r       Nephrology 10      dennis Hines

n

 

        2020 Outpatient                 [facility]    [facility]    [id number]

355 



        08:23:32 23:59:59                                         10      

 

        2020 Between                 [facility]

[id number] Memoria



        14:14:54 14:14:54 Visit                   r       Medicine 52      dennis Hines

michelle

 

        2020 Between                 [facility] [facility]

[id number] Memoria



        14:14:54 14:14:54 Visit                   r       Medicine 52      dennis Grant         Donny martinez

 

        2020 Outpatient                 [facility]    [facility]    [id number]

375 



        09:14:54 09:14:54                                         52      

 

        2020 Outpatient                 [facility]    [facility]    [id number]

365 [facility]oria



        11:30:00 11:30:00                                         43      dennis



                                                                        [place]

 

        2020 Outpatient                 [facility]    [facility]    [id number]

365 [facility]



        11:30:00 11:30:00                                         43      dennis



                                                                        [place]

 

        2020 2020-04-15 Phone                   [facility]

[id number] Memoria



        20:00:15 04:59:59 Message                 r       Medicine 09      l



                                                        Grant         Donny martinez

 

        2020 2020-04-15 Phone                   [facility] 3467

[id number] Memoria



        20:00:15 04:59:59 Message                 r       Medicine 09      dennis Rosariosylvie martinez

 

        2020 Outpatient                 MG    MG    [id number]

355 



        15:00:15 23:59:59                                         09      

 

        2020-04-10 2020 Phone                   [facility] 3467

[id number] Memoria



        17:18:28 04:59:59 Message                 r       Medicine 08      l



                                                        Rudy         Donny martinez

 

        2020-04-10 2020 Phone                   [facility] 3467

[id number] Memoria



        17:18:28 04:59:59 Message                 r       Medicine 08      l



                                                        Rudy         Donny martinez

 

        2020-04-10 2020 Phone                   [facility] MG    [id number]

55 Memoria



        13:26:55 04:59:59 Message                 r       Nephrology 07      l



                                                        Rudy         Donny martinez

 

        2020-04-10 2020 Phone                   [facility] MG    [id number]

55 Memoria



        13:26:55 04:59:59 Message                 r       Nephrology 07      l



                                                        Grant         Donny martinez

 

        2020-04-10 2020 Outpatient                 [facility]    [facility]    [id number]

355 



        12:18:28 23:59:59                                         08      

 

        2020-04-10 2020 Outpatient                 [facility]    [facility]    [id number]

355 



        08:26:55 23:59:59                                         07      

 

        2020 Outpatient                 [facility] 3

[id number] Memoria



        20:15:00 05:59:59                         r       Medicine 42      l



                                                        Rudy Hines

michelle

 

        2020 Outpatient                 [facility]

[id number] Memoria



        20:15:00 05:59:59                         r       Medicine 42      l



                                                        Rudy         Donny

michelle

 

        2020 Outpatient         [place],  MG    MG    [id number]

365 



        14:15:00 23:59:59                 Charisse MARTINEZ                 42      

 

        2020 Outpatient                 [facility]    [facility]    [id number]

365 Memoria



        14:15:00 14:15:00                                         42      dennis



                                                                        [place]

 

        2020 Phone                   [facility]67

[id number] Memoria



        14:22:27 05:59:59 Message                 r       Medicine 06      l



                                                        Rudy Hines

michelle

 

        2020 Phone                   [facility] 3467

[id number] Memoria



        14:22:27 05:59:59 Message                 r       Medicine 06      dennis Hines

michelle

 

        2020 Outpatient                 MG    MG    [id number]

355 



        08:22:27 23:59:59                                         06      

 

        2019 Phone                   [facility] 3467

[id number] Memoria



        16:06:04 05:59:59 Message                 r       Medicine 05      l



                                                        Rudy         Donny

michelle

 

        2019 Phone                   [facility] 3467

[id number] Memoria



        16:06:04 05:59:59 Message                 r       Medicine 05      l



                                                        Rudy Rosariosylvie martinez

 

        2019 Outpatient                 [facility]    [facility]    [id number]

355 



        10:06:04 23:59:59                                         05      

 

        2019 Between                 [facility] MG    [id number]

75 Memoria



        20:05:03 20:05:03 Visit                   r       Nephrology 45      l



                                                        Saundra         [place]

 

        2019 Between                 [facility] MG    [id number]

75 Memoria



        20:05:03 20:05:03 Visit                   r       Nephrology 45      l



                                                        [place]         [place]

 

        2019 Outpatient                 [facility]    [id number]

375 



        14:05:03 14:05:03                                         45      

 

        2019 Outpatient                 [facility]    [id number]

[id number] Memoria



        20:45:00 05:59:59                         r       Nephrology 38      dennis martinez

 

        2019 Outpatient                 [facility] MG    [id number]

[id number] Memoria



        20:45:00 05:59:59                         r       Nephrology 38      dennis Hines

n

 

        2019 Outpatient         [place]    346

[id number] 



        14:45:00 23:59:59                 Rosy,                   38      



                                        Gabriela CLARK                         

 

        2019 [facility]    [id number]

365 [facility]



        14:45:00 14:45:00                                         38      dennis Mart

 

        2019 Between                 [facility] MG    [id number]

75 Memoria



        00:07:10 00:07:10 Visit                   r       Nephrology 43      dennis Mart

 

        2019 Between                 [facility]    [id number]

75 Memoria



        00:07:10 00:07:10 Visit                   r       Nephrology 43      dennis Mart

 

        2019 Outpatient                 [facility]    [id number]

375 



        18:07:10 18:07:10                                         43      

 

        2019 Outpatient                 IE    IE    [id number]

365 Memoria



        09:00:00 09:00:00                                         39      dennis Mart

 

        2019 Outpatient                 IE    IE    [id number]

365 Memoria



        09:00:00 09:00:00                                         39      dennis



                                                                        [place]

 

        2019 Between                 [facility] 3467

[id number] Memoria



        21:47:12 21:47:12 Visit                   r       Medicine 41      dennis Hines

michelle

 

        2019 Between                 [facility] 3467

[id number] Memoria



        21:47:12 21:47:12 Visit                   r       Medicine 41      dennis Hines

michelle

 

        2019 Outpatient                 [facility]    [id number]

375 



        15:47:12 15:47:12                                         41      

 

        2019-10-29 2019-10-30 Between                 [facility]

[id number] Memoria



        22:13:13 22:13:13 Visit                   r       Medicine 40      dennis Hines

n

 

        2019-10-29 2019-10-30 Between                 [facility]

[id number] Memoria



        22:13:13 22:13:13 Visit                   r       Medicine 40      dennis Hines

n

 

        2019-10-29 2019-10-30 Outpatient                 [facility]    [facility]    [id number]

375 



        17:13:13 17:13:13                                         40      

 

        2019-10-25 2019-10-26 Outpatient                 [facility] 3

[id number] Memoria



        14:45:00 04:59:59                         r       Medicine 40      dennis Hines

n

 

        2019-10-25 2019-10-26 Outpatient                 [facility] 3

[id number] Memoria



        14:45:00 04:59:59                         r       Medicine 40      dennis Hines

n

 

        2019-10-25 2019-10-25 Outpatient         [place],  [facility]    [facility]    [id number]

365 



        09:45:00 23:59:59                 Charisse N                 40      

 

        2019-10-25 2019-10-25 Outpatient                 [facility]    [facility]    [id number]

365 [facility]oria



        09:45:00 09:45:00                                         40      dennis



                                                                        [place]

 

        2019-10-17 2019-10-17 Ambulatory                 [facility] 3

[id number] Memoria



        16:00:00 16:00:00 Pre-Reg                 r       Medicine 36      dennis Hines

n

 

        2019-10-17 2019-10-17 Ambulatory                 [facility] 3

[id number] Memoria



        16:00:00 16:00:00 Pre-Reg                 r       Medicine 36      dennis Hines

n

 

        2019-10-17 2019-10-17 Outpatient                 [facility]    [facility]    [id number]

365 Memoria



        11:00:00 11:00:00                                         36      dennis



                                                                        [place]

 

        2019-10-17 2019-10-17 Outpatient         [place],  [facility]    MG    [id number]

365 



        11:00:00 11:00:00                 Charisse N                 36      

 

        2019-10-10 2019-10-11 Outpatient                 [facility] MG    [id number]

[id number] Memoria



        15:00:00 04:59:59                         r       Nephrology 35      l



                                                        Rudy Hines

n

 

        2019-10-10 2019-10-11 Outpatient                 [facility]    [id number]

[id number] Memoria



        15:00:00 04:59:59                         r       Nephrology 35      l



                                                        Rudy Hines

n

 

        2019-10-10 2019-10-10 Outpatient         [place],  [facility]    [id number]

365 



        10:00:00 23:59:59                 Charisse N                 35      

 

        2019-10-10 2019-10-10 Outpatient                 IE    IE    [id number]

365 [facility]oria



        12:00:00 12:00:00                                         37      dennis Mart

 

        2019-10-10 2019-10-10 Outpatient                 IE    IE    [id number]

365 [facility]oria



        12:00:00 12:00:00                                         37      dennis Mart

 

        2019-10-10 2019-10-10 Outpatient                 IE    IE    [id number]

365 [facility]oria



        10:00:00 10:00:00                                         35      dennis Mart

 

        2019 Phone                   [facility] 3467

[id number] Memoria



        15:15:06 04:59:59 Message                 r       Medicine 04      dennis Hines

n

 

        2019 Phone                   [facility] 3467

[id number] Memoria



        15:15:06 04:59:59 Message                 r       Medicine 04      dennis Hines

n

 

        2019 Phone                   [facility]    [id number]

55 Memoria



        15:10:51 04:59:59 Message                 r       Nephrology 03      dennis Hines

n

 

        2019 Phone                   [facility] MG    [id number]

55 Memoria



        15:10:51 04:59:59 Message                 r       Nephrology 03      dennis Hines

n

 

        2019 Outpatient                 [facility]    [id number]

355 



        10:15:06 23:59:59                                         04      

 

        2019 Outpatient                 [facility]    [id number]

355 



        10:10:51 23:59:59                                         03      

 

        2019 Ambulatory                 [facility]    [id number]

[id number] Memoria



        20:00:00 20:00:00 Pre-Reg                 r       Nephrology 34      l



                                                        Rudy Hines

n

 

        2019 Ambulatory                 [facility]    [id number]

[id number] Memoria



        20:00:00 20:00:00 Pre-Reg                 r       Nephrology 34      l



                                                        Rudy Hines

n

 

        2019 Outpatient                 [facility]    [id number]

365 Memoria



        15:00:00 15:00:00                                         34      dennis Mart

 

        2019 Outpatient         [place]    346

[id number] 



        15:00:00 15:00:00                 Tate Gallegos                         

 

        2019 Between                 [facility] MG    [id number]

75 Memoria



        20:15:16 20:15:16 Visit                   r       Nephrology 34      dennis Mart

 

        2019 Between                 [facility] MG    [id number]

75 Memoria



        20:15:16 20:15:16 Visit                   r       Nephrology 34      dennis Mart

 

        2019 Outpatient                 [facility]    [id number]

375 



        15:15:16 15:15:16                                         34      

 

        2019 Phone                   [facility] MG    [id number]

55 Memoria



        15:29:54 04:59:59 Message                 r       Nephrology 02      dennis Mart

 

        2019 Phone                   [facility] MG    [id number]

55 Memoria



        15:29:54 04:59:59 Message                 r       Nephrology 02      dennis Mart

 

        2019 Outpatient                 MG    [facility]    [id number]

355 



        10:29:54 23:59:59                                         02      

 

        2019 Ambulatory                 [facility] MG    [id number]

[id number] Memoria



        16:30:00 16:30:00 Pre-Reg                 r       Nephrology 29      l



                                                        Rudy martinez

 

        2019 Ambulatory                 [facility] MG    [id number]

[id number] Memoria



        16:30:00 16:30:00 Pre-Reg                 r       Nephrology 29      l



                                                        Rudy Hines

michelle

 

        2019 Ambulatory                 [facility] MG    [id number]

[id number] Memoria



        16:15:00 16:15:00 Pre-Reg                 r       Nephrology 30      l



                                                        Rudy Hines

michelle

 

        2019 Ambulatory                 [facility] MG    [id number]

[id number] Memoria



        16:15:00 16:15:00 Pre-Reg                 r       Nephrology 30      l



                                                        Rudy Hines

michelle

 

        2019 Ambulatory                 [facility] 3

[id number] Memoria



        15:15:00 15:15:00 Pre-Reg                 r       Medicine 31      dennis Hines

n

 

        2019 Ambulatory                 [facility] 3

[id number] Memoria



        15:15:00 15:15:00 Pre-Reg                 r       Medicine 31      dennis martinez

 

        2019 Outpatient                 [facility]    [facility]    [id number]

365 Memoria



        11:30:00 11:30:00                                         29      dennis



                                                                        [place]

 

        2019 Outpatient         [place]    [facility]    [id number]

[id number] 



        11:30:00 11:30:00                 Rosy                   29      



                                        Gabriela CALRK                         

 

        2019 Outpatient                 [facility]    [facility]    [id number]

365 Memoria



        11:15:00 11:15:00                                         30      dennis



                                                                        [place]

 

        2019 Outpatient         [place]    [facility]    [id number]

[id number] 



        11:15:00 11:15:00                 Rosy                   Jamil      



                                        Gabriela CLARK                         

 

        2019 Outpatient                 [facility]    [facility]    [id number]

365 Memoria



        10:15:00 10:15:00                                         31      dennis



                                                                        [place]

 

        2019 Outpatient         [place],  MG    MG    [id number]

365 



        10:15:00 10:15:00                 Charisse MARTINEZ                 31      

 

        2019 Inpatient PABLITO ADEN,   [facility]     TELE    [id number]

[address]



        10:05:00 17:55:00                 GOPAL                         Med[facility]

l



                                                                        [place]

 

        2019 Outpatient PABLITO ADEN,   [facility]     TELE    [id number]

[address] [place]



        21:36:00 19:00:00                 GOPAL                         [facility]

l



                                                                        [place]

 

        2019 Outpatient                 [facility] [id number]

[id number] Memoria



        20:40:00 04:59:59                         r       Care    33      l



                                                        Candace         [place]

 

        2019 Outpatient                 [facility] 346

[id number] Memoria



        20:40:00 04:59:59                         r       Care    33      l



                                                        Candace         [place]

 

        2019 Outpatient         Van     [facility]    MG    [id number]

365 



        15:40:00 23:59:59                 [place]                 Yousif louise                           

 

        2019 Outpatient                 [facility]    [facility]    [id number]

365 Memoria



        15:40:00 15:40:00                                         33      dennis



                                                                        [place]

 

        2019 Outpatient                 [facility] 3

[id number] Memoria



        15:15:00 04:59:59                         r       Medicine 32      dennis Hines

michelle

 

        2019 Outpatient                 [facility] 3

[id number] Memoria



        15:15:00 04:59:59                         r       Medicine 32      dennis Hines

michelle

 

        2019 Outpatient         [place]- MG    MG    [id number]

[id number] 



        10:15:00 23:59:59                 Kristie Gallegos                         

 

        2019 Outpatient                 [facility]    [facility]    [id number]

365 Memoria



        10:15:00 10:15:00                                         32      dennis



                                                                        [place]

 

        2019 Between                 [facility] 3467

[id number] Memoria



        19:00:16 19:00:16 Visit                   r       Medicine 29      dennis Hines

michelle

 

        2019 Between                 [facility] 3467

[id number] Memoria



        19:00:16 19:00:16 Visit                   r       Medicine 29      dennis Hines

michelle

 

        2019 Outpatient                 MG    MG    [id number]

375 



        14:00:16 14:00:16                                         29      

 

        2019 2019-03-15 Between                 [facility] MG    [id number]

75 Memoria



        15:02:37 15:02:37 Visit                   r       Nephrology 27      dennis Hines

michelle

 

        2019 2019-03-15 Between                 [facility] MG    [id number]

75 Memoria



        15:02:37 15:02:37 Visit                   r       Nephrology 27      dennis Hines

michelle

 

        2019 2019-03-15 Outpatient                 MG    MG    [id number]

375 



        10:02:37 10:02:37                                         27      

 

        2019 2019-03-15 Outpatient                 [facility] MG    [id number]

[id number] Memoria



        18:45:00 04:59:59                         r       Nephrology 28      l



                                                        Rudy Hines

michelle

 

        2019 2019-03-15 Outpatient                 [facility]    [id number]

[id number] Memoria



        18:45:00 04:59:59                         r       Nephrology 28      dennis Hines

n

 

        2019 2019-03-15 Outpatient                 [facility] 3

[id number] Memoria



        14:15:00 04:59:59                         r       Medicine 22      dennis Hines

n

 

        2019 2019-03-15 Outpatient                 [facility] 3

[id number] Memoria



        14:15:00 04:59:59                         r       Medicine 22      dennis Hines

n

 

        2019 Outpatient         [place]    346

[id number] 



        13:45:00 23:59:59                 Husam Gallegos                         

 

        2019 Outpatient         [place],  [facility]    [id number]

365 



        09:15:00 23:59:59                 Charisse MARTINEZ                 22      

 

        2019 Outpatient                 [facility]    IE    [id number]

365 Memoria



        13:45:00 13:45:00                                         28      dennis



                                                                        [place]

 

        2019 Outpatient                 [facility]    IE    [id number]

365 Memoria



        09:15:00 09:15:00                                         22      dennis



                                                                        [place]

 

        2019 Between                 [facility] MG    [id number]

75 Memoria



        23:59:47 23:59:47 Visit                   r       Nephrology 26      dennis Hines

n

 

        2019 Between                 [facility] MG    [id number]

75 Memoria



        23:59:47 23:59:47 Visit                   r       Nephrology 26      dennis Hines

michelle

 

        2019 [facility]    [id number]

375 



        18:59:47 18:59:47                                         26      

 

        2019 Between                 [facility] MG    [id number]

75 Memoria



        22:00:33 22:00:33 Visit                   r       Nephrology 24      dnenis Hines

michelle

 

        2019 Between                 [facility] MG    [id number]

75 Memoria



        22:00:33 22:00:33 Visit                   r       Nephrology 24      dennis Hines

michelle

 

        2019 [facility]    [id number]

375 



        16:00:33 16:00:33                                         24      

 

        2019 Between                 [facility] MG    [id number]

75 Memoria



        04:48:44 04:48:44 Visit                   r       Nephrology 23      dennis martinez

 

        2019 Between                 [facility]    [id number]

75 Memoria



        04:48:44 04:48:44 Visit                   r       Nephrology 23      dennis martinez

 

        2019 Outpatient                 [facility]    MG    [id number]

375 



        22:48:44 22:48:44                                         23      

 

        2019 Ambulatory                 [facility] MG    [id number]

[id number] Memoria



        20:30:00 20:30:00 Pre-Reg                 r       Nephrology 27      dennis martinez

 

        2019 Ambulatory                 [facility] MG    [id number]

[id number] Memoria



        20:30:00 20:30:00 Pre-Reg                 r       Nephrology 27      dennis martinez

 

        2019 Ambulatory                 [facility] MG    [id number]

[id number] Memoria



        18:40:00 18:40:00 Pre-Reg                 r       Nephrology 24      dennis martinez

 

        2019 Ambulatory                 [facility] MG    [id number]

[id number] [facility]oria



        18:40:00 18:40:00 Pre-Reg                 r       Nephrology 24      dennis martinez

 

        2019 Outpatient                 [facility]    IE    [id number]

365 [facility]



        14:30:00 14:30:00                                         27      dennis Mart

 

        2019 Outpatient         [place]    MG    346

[id number] 



        14:30:00 14:30:00                 Zuleyma Gallegos                         

 

        2019 Outpatient         [place]    MG    346

[id number] 



        14:30:00 14:30:00                 Zuleyma Gallegos                         

 

        2019 Outpatient                 [facility]    IE    [id number]

365 [facility]



        12:40:00 12:40:00                                         24      dennis



                                                                        Bar[place]

 

        2019 Outpatient         [place]    [facility]    346

[id number] 



        12:40:00 12:40:00                 Michael Gallegos                         

 

        2019 Outpatient         [facility]    346

[id number] 



        12:40:00 12:40:00                 Michael Gallegos                         

 

        2019 Ambulatory                 [facility]    [id number]

83383 Memoria



        15:30:00 15:30:00 Pre-Reg                 r       Internal 25      [facility]         

 

        2019 Ambulatory                 nullFlavo MG    [id number]

90893 Memoria



        15:30:00 15:30:00 Pre-Reg                 r       Internal 25      [facility]         

 

        [id number]



        09:30:00 09:30:00                                         25      [facility]

 

        2019 Outpatient                 [facility]    [id number]

365 



        09:30:00 09:30:00                                         25      

 

        2018 2018-10-20 Ambulatory                 [facility]    [id number]

[id number] Memoria



        12:45:00 12:45:00 Pre-Reg                 r       Internal 23      [facility]         

 

        2018 2018-10-20 Ambulatory                 nullFlavo MG    [id number]

[id number] Memoria



        12:45:00 12:45:00 Pre-Reg                 r       Internal 23      [facility]         

 

        2018 2018-10-20 Outpatient                 [facility]    [id number]

365 



        07:45:00 07:45:00                                         23      

 

        2018 Outpatient                 nullFlavo MG    [id number]

[id number] Memoria



        19:15:00 04:59:59                         r       Nephrology 26      l



                                                        Rudy Hines

michelle

 

        2018 Outpatient                 nullFlavo MG    [id number]

[id number] Memoria



        19:15:00 04:59:59                         r       Nephrology 26      l



                                                        Rudy Hines

michelle

 

        2018 Outpatient                 nullFlavo MG    [id number]

[id number] Memoria



        18:00:00 04:59:59                         r       Nephrology 21      l



                                                        Rudy Hines

michelle

 

        2018 Outpatient                 nullFlavo MG    [id number]

[id number] Memoria



        18:00:00 04:59:59                         r       Nephrology 21      l



                                                        Rudy Hines

michelle

 

        2018 Outpatient         [facility]    346

[id number] 



        14:15:00 23:59:59                 DacTay cade      



                                        Gabriela EDUARDO                         

 

        2018 Outpatient         [place]    346

[id number] 



        13:00:00 23:59:59                 DacNela cade      



                                        Gabriela CLARK                         

 

        2018 Outpatient                 IE    IE    [id number]

365 [facility]



        14:15:00 14:15:00                                         26      dennis



                                                                        [place]

 

        2018 Outpatient                 [facility]    IE    [id number]

365 [facility]



        13:00:00 13:00:00                                         21      dennis



                                                                        [place]

 

        2018 Outpatient                 [facility]    IE    [id number]

365 [facility]



        07:45:00 07:45:00                                         23      dennis



                                                                        [place]

 

        2018 Outpatient                 [facility]

[id number] Memoria



        18:30:00 04:59:59                         r       Medicine 20      dennis Hines

michelle

 

        2018 Outpatient                 [facility]

[id number] Memoria



        18:30:00 04:59:59                         r       Medicine 20      l



                                                        Rudy Hines

michelle

 

        2018 Outpatient         [place],  MG    MG    [id number]

365 



        13:30:00 23:59:59                 Charisse MARTINEZ                 20      

 

        2018 Outpatient                 IE    IE    [id number]

365 [facility]



        13:30:00 13:30:00                                         20      dennis



                                                                        [place]

 

        2018 Outpatient                 nullFlavo MG    [id number]

[id number] Memoria



        16:00:00 04:59:59                         r       Nephrology 19      dennis Hines

n

 

        2018 Outpatient                 nullFlavo MG    [id number]

[id number] [facility]oria



        16:00:00 04:59:59                         r       Nephrology 19      dennis Hines

michelle

 

        2018 [facility]

[id number] 



        11:00:00 23:59:59                 Toney Gallegos                         

 

        2018 [facility]

[id number] 



        11:00:00 23:59:59                 Toney Gallegos                         

 

        2018 Outpatient                 [facility]    [facility]    [id number]

365 [facility]



        11:00:00 11:00:00                                         19      dennis



                                                                        [place]

 

        2018 Outpatient                 [facility]

[id number] Memoria



        15:00:00 04:59:59                         r       Medicine 18      l



                                                        Rudy Hines

n

 

        2018 Outpatient                 [facility]

[id number] Memoria



        15:00:00 04:59:59                         r       Medicine 18      l



                                                        Rudy Hines

n

 

        2018 Outpatient         [facility],  [facility]    [facility]    [id number]

365 



        10:00:00 23:59:59                 Charisse N                 18      

 

        2018 Outpatient         [place],  [facility]    [facility]    [id number]

365 



        10:00:00 23:59:59                 Charisse N                 18      

 

        2018 Outpatient                 [facility]    [facility]    [id number]

365 [facility]



        10:00:00 10:00:00                                         18      dennis Mart

 

        2017 Outpatient                 [facility]    [facility]    [id number]

365 [facility]



        10:40:00 10:40:00                                         16      dennis Mart

 

        2017 Outpatient                 [facility]    [facility]    [id number]

365 [facility]



        10:40:00 10:40:00                                         16      dennis Mart

 

        2017 Outpatient                 [facility]    [facility]    [id number]

365 [facility]



        11:30:00 11:30:00                                         17      dennis Mart

 

        2017 Outpatient                 [facility]    [facility]    [id number]

365 [facility]



        11:30:00 11:30:00                                         17      dennis Mart

 

        2017 Outpatient                 [facility]    [facility]    [id number]

365 [facility]



        11:40:00 11:40:00                                         11      dennis Mart

 

        2017 Outpatient                 [facility]    [facility]    [id number]

365 [facility]



        11:40:00 11:40:00                                         11      dennis Mart

 

        2017 Outpatient                 [facility]    [facility]    [id number]

365 [facility]



        14:30:00 14:30:00                                         15      dennis Mart

 

        2017 Outpatient                 [facility]    [facility]    [id number]

365 [facility]



        14:30:00 14:30:00                                         15      l



                                                                        [place]

 

        2017 Outpatient                 [facility]    [facility]IE    [id number]

365 [facility]



        10:00:00 10:00:00                                         08      dennis Mart

 

        2017 Outpatient                 [facility]    [facility]    [id number]

365 [facility]



        10:00:00 10:00:00                                         08      dennis Mart

 

        2017 Bedded                  nullFlavo [facility] [id number]

375 Memoria



        11:48:35 14:30:00 Outpatient                 r       [place] 13      dennis Harper



 

        2017 Bedded                  nullFlavo [facility] [id number]

375 Memoria



        11:48:35 14:30:00 Outpatient                 xuan Rosarioann 13      dennis Harper



 

        2017 Outpatient         [facility]    [id number]

[id number] 



        05:48:35 08:30:00                 Randy Velasco      

 

        2017 Outpatient                 [facility]    [facility]    [id number]

365 [facility]



        13:15:00 13:15:00                                         14      dennis Mart

 

        2017 Outpatient                 [facility]    [facility]    [id number]

365 [facility]



        13:15:00 13:15:00                                         14      dennis Mart

 

        2016 Outpatient                 [facility]    [facility]    [id number]

365 [facility]



        09:30:00 09:30:00                                         12      dennis Mart

 

        2016 Outpatient                 [facility]    [facility]    [id number]

365 [facility]



        09:30:00 09:30:00                                         13      dennis Mart

 

        2016 Outpatient                 [facility]    [facility]IE    [id number]

365 [facility]



        09:30:00 09:30:00                                         12      dennis Mart

 

        2016 Outpatient                 [facility]    [facility]IE    [id number]

365 [facility]



        09:30:00 09:30:00                                         13      dennis Mart

 

        2016 Outpatient                 [facility]    [facility]IE    [id number]

365 [facility]



        10:20:00 10:20:00                                         09      dennis Mart

 

        2016 Outpatient                 [facility]    [facility]    [id number]

365 [facility]



        10:20:00 10:20:00                                         09      dennis Mart

 

        2016 Outpatient                 [facility]    [facility]    [id number]

365 [facility]



        13:00:00 13:00:00                                         04      dennis Mart

 

        2016 [id number]

[facility]



        13:00:00 13:00:00                                         04      dennis Mart

 

        2016 [facility]    [id number]

365 [facility]



        11:15:00 11:15:00                                         06      dennis Mart

 

        2016 [facility]    [id number]

365 [facility]



        11:15:00 11:15:00                                         06      dennis Mart

 

        2016 OP Therapy                 nullFlavo [facility]     [id number]

[id number] Memoria



        13:00:00 04:59:00 Patients                 xuan Rajput 03      dennis Mart

 

        2016 OP Therapy                 [facility]Flavo [facility]     [id number]

[id number] Memoria



        13:00:00 04:59:00 Patients                 xuan Rajput 03      dennis Mart

 

        2016 Outpatient         Lei,   2.16.840. 2.16.840.1. 3

[id number] 



        08:00:00 23:59:00                 Yoan M [id number]. [id number] 03    

  



                                                3.615.55 5.55            

 

        2016 OP Therapy                 [facility]Flavo [facility]     [id number]

[id number] Memoria



        17:39:00 04:59:00 Patients                 r       [place] 02      dennis Mart

 

        2016 OP Therapy                 [facility]Flavo [facility]     [id number]

[id number] Memoria



        17:39:00 04:59:00 Patients                 xuan Rajput 02      dennis Mart

 

        2016 Outpatient         Lei,   2.16.840. 2.16.840.1. 3

[id number] 



        12:39:00 23:59:00                 Yoan M 1.984056. 802603.3.61 02    

  



                                                3.615.55 5.55            

 

        2016 OP Therapy                 [facility] [place] 346

[id number] Memoria



        19:00:00 04:59:00 Patients                 r       [place]         dennis Mart

 

        2016 OP Therapy                 [facility]avo [facility] [place] 346

[id number] Memoria



        19:00:00 04:59:00 Patients                 r       [place]         dennis Mart

 

        2016 Outpatient         Do,   2.16.840. 2.16.840.1. 3

[id number] 



        14:00:00 23:59:00                 Yoan RICHARDS 1.599782. 991320.3.61 01    

  



                                                3.615.69 5.69            

 

        2016 Outpt Diag                 nullFlavo [facility]    [id number]

24582 Memoria



        16:14:00 04:59:00 Services                 r       Outpatient 04      l



                                                        Imaging         [place]         

 

        2016 Outpt Diag                 [facility]Fl[facility]    [id number]

34572 Memoria



        16:14:00 04:59:00 Services                 r       Outpatient 04      



                                                        Imaging         [place]         

 

        2016 [facility] [address]    346

[id number] 



        11:14:00 23:59:00                 Roland Gallegos                         

 

        2016 OP Therapy                 [facility] [place] 346

[id number] Memoria



        19:00:00 04:59:00 Patients                 r       [place]   00      dennis



                                                                        [place]

 

        2016 OP Therapy                 [facility] [place] 346

[id number] Memoria



        19:00:00 04:59:00 Patients                 r       [place]   00      dennis



                                                                        [place]

 

        2016 Outpatient         Do,   2.16.840. 2.16.840.1. 3

[id number] 



        14:00:00 23:59:00                 Yoan M [id number]. 843347.3.61 00    

  



                                                3.615.69 5.69            

 

        2016 Outpatient                 IE    IE    [id number]

365 [facility]



        10:20:00 10:20:00                                               dennis



                                                                        [place]

 

        2016 Outpatient                 IE    IE    [id number]

365 [facility]



        10:20:00 10:20:00                                               dennis Mart

 

        2016-05-10 2016 Outpt Diag                 [facility]Fl[facility]    [id number]

32152 Memoria



        14:59:00 04:59:00 Services                 r       Outpatient 03      l



                                                        Imaging         Barron Zarate            

 

        2016-05-10 2016 Outpt Diag                 [facility]    [id number]

25331 Memoria



        14:59:00 04:59:00 Services                 r       Outpatient 03      l



                                                        Imaging         Barron Zarate            

 

        2016-05-10 2016-05-10 Outpatient         Abbi Somers 28    28    346

[id number] 



        09:59:00 23:59:00                 [place]                  2016 Outpt Diag                 [facility]    [id number]

[id number] Memoria



        18:11:00 04:59:00 Services                 r       Outpatient 01      l



                                                        Imaging         Barron [place]         

 

        2016 Outpt Diag                 [facility]    [id number]

38991 Memoria



        18:11:00 04:59:00 Services                 r       Outpatient 01      l



                                                        Imaging         Barron Pinzon         

 

        2016 Outpatient         Abbi Somers 29    29    346

[id number] 



        13:11:00 23:59:00                 [place]                  01      

 

        2016-03-10 2016-03-10 Outpatient                 nullFlavo [facility]    [id number]

   Memoria



        12:50:31 19:25:00                         r                       l



                                                                        [place]

 

        2016-03-10 2016-03-10 Outpatient                 2.16.840. 2.16.840.1. 3

[id number]   Memoria



        12:50:31 19:25:00                         [id number] 800553.3.20         

l



                                                3.2081.20 81.2000         Donny

n



                                                00                      [place]



                                                                        l



                                                                        [facility]

 

        2016-03-10 2016-03-10 Outpatient                 [facility]Flavo [facility]    [id number]

   Memoria



        12:50:31 19:25:00                         r                       l



                                                                        [place]

 

        2016 2016-02-10 Outpt Diag                 nullFlavo [facility]    [id number]

78495 Memoria



        12:58:00 05:59:00 Services                 r       Outpatient 00      l



                                                        Imaging         Barron Pinzon         

 

        2016 2016-02-10 Outpt Diag                 [facility]Flavo [facility]    [id number]

10362 Memoria



        12:58:00 05:59:00 Services                 r       Outpatient 00      l



                                                        Imaging         Barron Pinzon         

 

        2016 Outpatient         Abbi Somers 29    29    346

[id number] 



        06:58:00 23:59:00                 [place]                  2016 Outpatient                 [facility]    [id number]

365 Memoria



        10:15:00 10:15:00                                         02      l



                                                                        [place]

 

        2016 Outpatient                 [facility]    [id number]

365 Memoria



        10:15:00 10:15:00                                         02      l



                                                                        [place]

 

        2015 Outpatient                 [facility]    [id number]

365 Memoria



        11:30:00 11:30:00                                         00      l



                                                                        [place]

 

        2015 Outpatient                 [facility]    [id number]

365 Memoria



        11:30:00 11:30:00                                         00      l



                                                                        [place]

 

        2014 Outpatient                 nullFlavo [place] [id number]

2273_3 Memoria



        01:16:00 05:59:00                         r       [place] [id number] 



                                                        [place] [facility]       [facility]

 

        2014 Outpatient                 nullFlavo [facility] 3467

2273_3 Memoria



        01:16:00 05:59:00                         r       [place] [id number] 



                                                        [place] [facility]       [facility]

 

        2014 Outpatient         [facility] 2.16.840. 2.16.840.

1. [id number] 



        19:16:00 23:59:00                 , Vignesh 1.745796. 093248.3.61        

 



                                        Caitlin  3.615.0.1 5.0.100         



                                                00                      

 

        2014 Outpatient                 nullFlavo       [id number]

47025 Memoria



        19:16:00 19:16:00                         r       [place] 01      l



                                                                        [place]

 

        2014 Outpatient                 nullFlavo       [id number]

14008 Memoria



        19:16:00 19:16:00                         r       [place] 01      l



                                                                        [place]

 

        2014 Outpatient                 nullFlavo       [id number]

18893 Memoria



        19:43:00 19:43:00                         r       [place] 00      l



                                                                        [place]

 

        2014 Outpatient                 nullFlavo       [id number]

62971 Memoria



        19:43:00 19:43:00                         r       [place] 00      l



                                                                        [place]







Results







           Test Description Test Time  Test Comments Results    Result Comments 

Source









                          SARS-CoV-2 (COVID-19) RNA [Presence] in Respiratory sp

ecimen by 2022 

19:34:22                                



                    EDWARD with probe detection                     









                      Test Item  Value      Reference Range Interpretation Comme

nts









             SARS-CoV-2 (COVID-19) RNA [Presence] in Respiratory specimen by EDWARD

 Detected                               



             with probe detection (test code = 95341-1)                         

               

 

             Whether patient is employed in a healthcare setting (test code = Un

known                                



             72817-1)                                            

 

             Whether the patient has symptoms related to condition of interest U

nknown                                



             (test code = 28204-7)                                        

 

             Whether the patient was hospitalized for condition of interest (mariama

t Unknown                                



             code = 92274-8)                                        

 

             Whether the patient was admitted to intensive care unit (ICU) for U

nknown                                



             condition of interest (test code = 65319-8)                        

                

 

             Whether patient resides in a congregate care setting (test code = U

nknown                                



             54632-1)                                            

 

             Pregnancy status (test code = 82810-3) Unknown                     

           

 

             Date and time of symptom onset (test code = 63364-3) Unknown       

                         



BASIC METABOLIC [id number] 16:39:00





             Test Item    Value        Reference Range Interpretation Comments

 

             SODIUM (test code = NA) 138 mEq/L    134-147      N            

 

             POTASSIUM (test code = 3.7 mEq/L    3.4-5.0      N            



             K)                                                  

 

             CHLORIDE (test code = 100 mEq/L    100-108      N            



             CL)                                                 

 

             CARBON DIOXIDE (test 28 mEq/l     21-33        N            



             code = CO2)                                         

 

             ANION GAP (test code = 13           0-20         N            



             GAP)                                                

 

             GLUCOSE (test code = 77 mg/dL            N            



             GLU)                                                

 

             BLOOD UREA NITROGEN 13 mg/dL     7-18         N            



             (test code = BUN)                                        

 

             GLOMERULAR FILTRATION 11.6         80-90        L            Units 

of measure =



             RATE (test code = GFR)                                        ml/mi

n/1.73 m2

 

             CREATININE (test code = 3.9 mg/dL    0.6-1.3      H            



             CREAT)                                              

 

             CALCIUM (test code = 8.8 mg/dL    8.0-10.5     N            



             CA)                                                 



PROTHROMBIN [id number] 16:33:00





             Test Item    Value        Reference Range Interpretation Comments

 

             PROTHROMBIN TIME 13.1 SECONDS 9.3-12.9     H            



             PATIENT (test code =                                        



             PTP)                                                

 

             INTERNATIONAL NORMAL 1.2          0.8-1.2      N             TARGET

 INR BY



             RATIO (test code =                                        INDICATIO

N Indication



             INR)                                                INR1. Prophylax

is of



                                                                 venous thrombos

is 2.0



                                                                 - 3.0 (orthoped

ic



                                                                 surgery), Proph

ylaxis



                                                                 of venous throm

bosis



                                                                 (other than hig

h-risk



                                                                 surgery), Treat

ment of



                                                                 Deep Vein



                                                                 Thrombosis/Pulm

onary



                                                                 Embolism, Preve

ntion



                                                                 of systemic emb

olism -



                                                                 Tissue heart va

lves,



                                                                 Acute Myocardia

l



                                                                 Infarction (to 

prevent



                                                                 systemic emboli

sm),



                                                                 Valvular heart



                                                                 disease, Atrial



                                                                 Fibrillation,



                                                                 Bileaflet mecha

nical



                                                                 valve in aortic



                                                                 position.2. Mec

hanical



                                                                 prosthetic valv

es



                                                                 (high risk), 2.

5 - 3.5



                                                                 Presence of Lup

us



                                                                 Anticoagulant o

r



                                                                 Antiphospholipi

d



                                                                 Antibodies, Pre

vention



                                                                 of systemic emb

olism -



                                                                 Acute Myocardia

l



                                                                 Infarction (to 

prevent



                                                                 recurrent infar

ct).



CBC W/AUTO [id number] 16:23:00





             Test Item    Value        Reference Range Interpretation Comments

 

             WHITE BLOOD CELL (test code = 9.2 x10 3/uL 4.5-11.0     N          

  



             WBC)                                                

 

             RED BLOOD CELL (test code = 3.17 x10 6/uL 3.54-5.02    L           

 



             RBC)                                                

 

             HEMOGLOBIN (test code = HGB) 10.3 g/dL    11.0-15.0    L           

 

 

             HEMATOCRIT (test code = HCT) 33.2 %       33.0-45.0    N           

 

 

             MEAN CELL VOLUME (test code = 104.7 fL     81.0-99.0    H          

  



             MCV)                                                

 

             MEAN CELL HGB (test code = MCH) 32.5 pg      27.0-33.0    N        

    

 

             MEAN CELL HGB CONCETRATION 31.0 g/dL    33.0-37.0    L            



             (test code = MCHC)                                        

 

             RED CELL DISTRIBUTION WIDTH CV 14.5 %       11.5-14.5    N         

   



             (test code = RDW)                                        

 

             RED CELL DISTRIBUTION WIDTH SD 56.6 fL      37.0-54.0    H         

   



             (test code = RDW-SD)                                        

 

             PLATELET COUNT (test code = 108 x10 3/uL 150-400      L            



             PLT)                                                

 

             MEAN PLATELET VOLUME (test code 10.2 fL      7.0-9.0      H        

    



             = MPV)                                              

 

             NEUTROPHIL % (test code = NT%) 80.2 %       56.0-77.0    H         

   

 

             IMMATURE GRANULOCYTE % (test 1.4 %        0.0-2.0      N           

 



             code = IG%)                                         

 

             LYMPHOCYTE % (test code = LY%) 11.0 %       14.0-32.0    L         

   

 

             MONOCYTE % (test code = MO%) 5.2 %        4.8-9.0      N           

 

 

             EOSINOPHIL % (test code = EO%) 1.5 %        0.3-3.7      N         

   

 

             BASOPHIL % (test code = BA%) 0.7 %        0.0-2.0      N           

 

 

             NUCLEATED RBC % (test code = 0.0 %        0-0          N           

 



             NRBC%)                                              

 

             NEUTROPHIL # (test code = NT#) 7.34 x10 3/uL 2.0-7.6      N        

    

 

             IMMATURE GRANULOCYTE # (test 0.13 x10 3/uL 0.00-0.03    H          

  



             code = IG#)                                         

 

             LYMPHOCYTE # (test code = LY#) 1.01 x10 3/uL 1.0-3.8      N        

    

 

             MONOCYTE # (test code = MO#) 0.48 x10 3/uL 0.1-0.8      N          

  

 

             EOSINOPHIL # (test code = EO#) 0.14 x10 3/uL 0.0-0.2      N        

    

 

             BASOPHIL # (test code = BA#) 0.06 x10 3/uL 0.0-0.2      N          

  

 

             NUCLEATED RBC # (test code = 0.00 x10 3/uL 0.0-0.1      N          

  



             NRBC#)                                              

 

             MANUAL DIFF REQUIRED (test code NO                                 

    



             = MDIFF)                                            



HEPATITIS B SURFACE [id number] 21:36:32





             Test Item    Value        Reference Range Interpretation Comments

 

             HEPATITIS B SURFACE ANTIBODY < mIU/mL     <8.0                     

 



             (BEAKER) (test code = 647)                                        



 ID - DBHEPATITIS B SURFACE [id number] 21:24:22





             Test Item    Value        Reference Range Interpretation Comments

 

             HEPATITIS B SURFACE ANTIGEN (2) Nonreactive  Nonreactive           

    



             (BEAKER) (test code = 2585)                                        



Specimen is considered negative for HBsAg.SARS-CoV-2 (COVID-19) RNA [Presence] 
in Respiratory specimen by EDWARD with probe [id number] 00:41:02





             Test Item    Value        Reference Range Interpretation Comments

 

             SARS-CoV-2 (COVID-19) RNA Not detected                           



             [Presence] in Respiratory                                        



             specimen by EDWARD with probe                                        



             detection (test code = 74850-6)                                    

    

 

             Whether patient is employed in a Unknown                           

     



             healthcare setting (test code =                                    

    



             16849-6)                                            

 

             Whether the patient has symptoms Unknown                           

     



             related to condition of interest                                   

     



             (test code = 43206-8)                                        

 

             Whether the patient was Unknown                                



             hospitalized for condition of                                      

  



             interest (test code = 91382-6)                                     

   

 

             Whether the patient was admitted Unknown                           

     



             to intensive care unit (ICU) for                                   

     



             condition of interest (test code                                   

     



             = 10555-1)                                          

 

             Whether patient resides in a Unknown                               

 



             congregate care setting (test                                      

  



             code = 93499-7)                                        

 

             Pregnancy status (test code = Unknown                              

  



             63596-5)                                            

 

             Date and time of symptom onset Unknown                             

   



             (test code = 48484-3)                                        



- XR [id number] 10:28:00
[facility]  : 1955 Sex: 
F************************************************************ Name: 
SUZI SEARS [facility] : 1955 Age/S: 66 / F [id number] 
Unit #: [id number] Loc: [place] [address] Phys: Suzie Dominguez MD Acct: 
[id number] Dis Date: Status: REG SDC PHONE #: [phone] Exam Date: 
2022 0950 FAX #: Reason: ERCP EXAMS: CPT: [id number] XR ERCP 42037 Fluoro
Time: 33 SEC DAP (Gy m2): Air Kerma (mGy): EXAMINATION: - XR ERCP. LOCATION: 
S17. HISTORY: ERCP, CBDobstruction. COMPARISON: None. FINDINGS/ IMPRESSION: Nine
 portable limited intraoperative fluoroscopic images of right upper quadrant 
during ERCP are submitted to radiology department. Initial image demonstrates 
CBD stent and postoperative clips in upper abdomen. Subsequent images 
demonstrate contrastopacification of CBD and small bowel. Please see operative 
report for further details. No radiologist was present during procedure. 
FLUOROSCOPIC TIME: 35.6 seconds. Reference air Kerma: 11.146 mGy.  **
Electronically Signed by ISH Paz ** ** on 2022 at 1028 **
 Reported and signedby: Roverto Paz M.D. CC: Suzie Dominguez MD; Charisse Green MD PAGE 1 Signed Report Name: SUZI SEARS : 
1955 Age/S: 66 / F [id number] Unit #: [id number] Loc:[place] 
[address] Phys: Suzie Dominguez MD Acct: [id number] Dis Date: Status: REG [facility] [place], [place] PHONE 
#: [phone] Exam Date: 2022 0966 FAX #: Reason: ERCP  EXAMS: CPT: 
[id number] XR ERCP [id number] Fluoro Time: 33 SEC DAP (Gy m2): Air Kerma (mGy): 
(Continued) Technologist: Maribel Diaz, RT(R) Trnscb Date/Time: 2022 
(3536) [email or web address] Orig Print D/T: S: 2022 (7129) PAGE 2 Signed Report
[id number] 08:51:00





             Test Item    Value        Reference Range Interpretation Comments

 

             POTASSIUM (test code = K) 5.1 mmol/L   3.4-5.0      H            



CBC W/AUTO [id number] 08:15:00





             Test Item    Value        Reference Range Interpretation Comments

 

             WHITE BLOOD CELL (test code = 9.2 K/mm3    3.5-11.0     N          

  



             WBC)                                                

 

             RED BLOOD CELL (test code = 3.76 M/mm3   4.70-6.10    L            



             RBC)                                                

 

             HEMOGLOBIN (test code = HGB) 11.8 G/DL    10.4-14.9    N           

 

 

             HEMATOCRIT (test code = HCT) 37.7 %       31.5-44.1    N           

 

 

             MEAN CELL VOLUME (test code = 100.3 Fl     84.5-98.6    H          

  



             MCV)                                                

 

             MEAN CELL HGB (test code = MCH) 31.4 pg      27.0-34.2    N        

    

 

             MEAN CELL HGB CONCETRATION 31.3 G/DL    31.5-34.0    L            



             (test code = MCHC)                                        

 

             RED CELL DISTRIBUTION WIDTH 13.2 SD      11.5-14.5    N            



             (test code = RDW)                                        

 

             PLATELET COUNT (test code = 136 K/mm3    150-450      L            



             PLT)                                                

 

             MEAN PLATELET VOLUME (test code 9.80 fL      7.0-10.5     N        

    



             = MPV)                                              

 

             NEUTROPHIL % (test code = NT%) 71.9 %       40-76        N         

   

 

             IMMATURE GRANULOCYTE % (test 0.9 %        0.0-5.0      N           

 



             code = IG%)                                         

 

             LYMPHOCYTE % (test code = LY%) 18.1 %       20.5-51.1    L         

   

 

             MONOCYTE % (test code = MO%) 6.8 %        1.7-9.3      N           

 

 

             EOSINOPHIL % (test code = EO%) 1.5 %        0.0-6.0      N         

   

 

             BASOPHIL % (test code = BA%) 0.8 %        0.0-2.0      N           

 

 

             NUCLEATED RBC % (test code = 0.0 /100WBC% 0.0-1.0      N           

 



             NRBC%)                                              

 

             NEUTROPHIL # (test code = NT#) 6.7 K/mm3    1.8-7.6      N         

   

 

             IMMATURE GRANULOCYTE # (test 0.08 x10 3/uL 0.00-0.03    H          

  



             code = IG#)                                         

 

             LYMPHOCYTE # (test code = LY#) 1.7 K/mm3    0.6-3.2      N         

   

 

             MONOCYTE # (test code = MO#) 0.6 K/mm3    0.3-1.1      N           

 

 

             EOSINOPHIL # (test code = EO#) 0.1 K/mm3    0.0-0.4      N         

   

 

             BASOPHIL # (test code = BA#) 0.1 K/mm3    0.0-0.1      N           

 

 

             NUCLEATED RBC # (test code = 0.0 K/mm3    0.0-0.1      N           

 



             NRBC#)                                              

 

             MANUAL DIFF REQUIRED (test code NO DIFF/SCN  CRITERIA              

    



             = MDIFF)                                            



BASIC METABOLIC [id number] 08:08:00





             Test Item    Value        Reference Range Interpretation Comments

 

             SODIUM (test code = NA) 135 mmol/L   134-147      N            

 

             POTASSIUM (test code = K) 5.9 mmol/L   3.4-5.0      HH           

 

             CHLORIDE (test code = CL) 103 mmol/L   100-108      N            

 

             CARBON DIOXIDE (test code = CO2) 27 mmol/L    21-32        N       

     

 

             ANION GAP (test code = GAP) 5.0 GAP calc 4.0-15.0     N            

 

             GLUCOSE (test code = GLU) 109 MG/DL           N            

 

             BLOOD UREA NITROGEN (test code = 43 MG/DL     7-18         H       

     



             BUN)                                                

 

             GLOMERULAR FILTRATION RATE (test 4 estGFR     >60          L       

     



             code = GFR)                                         

 

             CREATININE (test code = CREAT) 10.5 MG/DL   0.6-1.0      H         

   

 

             CALCIUM (test code = CA) 9.9 MG/DL    8.5-10.1     N            



COVID 19 INHOUSE [id number] 14:11:00





             Test Item    Value        Reference Range Interpretation Comments

 

             COVID 19 INHOUSE AG NEGATIVE     Negative                  Per arthur

facturer,



             (test code =                                        negative result

s should



             [id number])                                        be treated aspr

esumptive



                                                                 and, if inconsi

stent with



                                                                 clinical signs



                                                                 andsymptoms or 

necessary



                                                                 for patient man

agement,



                                                                 should betested

 with an



                                                                 alternative mol

ecular



                                                                 assay. Negative

 resultsdo



                                                                 not preclude SA

RS-CoV-2



                                                                 infection and s

hould not



                                                                 be usedas the s

ole basis



                                                                 for patient man

agement



                                                                 decisions. Nega

tive



                                                                 results should 

be



                                                                 considered in t

he context



                                                                 of apatient's r

ecent



                                                                 exposures, hist

ory,



                                                                 presence of cli

nicalsigns



                                                                 and symptoms co

nsistent



                                                                 with COVID-19.



CBC W/AUTO [id number] 14:00:00





             Test Item    Value        Reference Range Interpretation Comments

 

             WHITE BLOOD CELL 7.2 K/mm3    3.5-11.0     N            



             (test code = WBC)                                        

 

             RED BLOOD CELL (test 4.01 M/mm3   4.70-6.10    L            



             code = RBC)                                         

 

             HEMOGLOBIN (test code 12.6 G/DL    10.4-14.9    N            



             = HGB)                                              

 

             HEMATOCRIT (test code 39.7 %       31.5-44.1    N            



             = HCT)                                              

 

             MEAN CELL VOLUME 99.0 Fl      84.5-98.6    H            



             (test code = MCV)                                        

 

             MEAN CELL HGB (test 31.4 pg      27.0-34.2    N            



             code = MCH)                                         

 

             MEAN CELL HGB 31.7 G/DL    31.5-34.0    N            



             CONCETRATION (test                                        



             code = MCHC)                                        

 

             RED CELL DISTRIBUTION 13.3 SD      11.5-14.5    N            



             WIDTH (test code =                                        



             RDW)                                                

 

             PLATELET COUNT (test 106 K/mm3    150-450      L            



             code = PLT)                                         

 

             MEAN PLATELET VOLUME 10.20 fL     7.0-10.5     N            



             (test code = MPV)                                        

 

             NEUTROPHIL % (test 72.0 %       40-76        N            



             code = NT%)                                         

 

             IMMATURE GRANULOCYTE 0.7 %        0.0-5.0      N            



             % (test code = IG%)                                        

 

             LYMPHOCYTE % (test 17.8 %       20.5-51.1    L            



             code = LY%)                                         

 

             MONOCYTE % (test code 7.4 %        1.7-9.3      N            



             = MO%)                                              

 

             EOSINOPHIL % (test 1.4 %        0.0-6.0      N            



             code = EO%)                                         

 

             BASOPHIL % (test code 0.7 %        0.0-2.0      N            



             = BA%)                                              

 

             NUCLEATED RBC % (test 0.0 /100WBC% 0.0-1.0      N            



             code = NRBC%)                                        

 

             NEUTROPHIL # (test 5.2 K/mm3    1.8-7.6      N            



             code = NT#)                                         

 

             IMMATURE GRANULOCYTE 0.05 x10 3/uL 0.00-0.03    H            



             # (test code = IG#)                                        

 

             LYMPHOCYTE # (test 1.3 K/mm3    0.6-3.2      N            



             code = LY#)                                         

 

             MONOCYTE # (test code 0.5 K/mm3    0.3-1.1      N            



             = MO#)                                              

 

             EOSINOPHIL # (test 0.1 K/mm3    0.0-0.4      N            



             code = EO#)                                         

 

             BASOPHIL # (test code 0.1 K/mm3    0.0-0.1      N            



             = BA#)                                              

 

             NUCLEATED RBC # (test 0.0 K/mm3    0.0-0.1      N            



             code = NRBC#)                                        

 

             MANUAL DIFF REQUIRED NO DIFF/SCN  CRITERIA                  SLIDE R

SERGIOW



             (test code = MDIFF)                                        CONSISTA

NT WITH



                                                                 AUTO DIFFERENTI

AL.



BASIC METABOLIC [id number] 13:32:00





             Test Item    Value        Reference Range Interpretation Comments

 

             SODIUM (test code = NA) 136 mmol/L   134-147      N            

 

             POTASSIUM (test code = K) 5.1 mmol/L   3.4-5.0      H            

 

             CHLORIDE (test code = CL) 103 mmol/L   100-108      N            

 

             CARBON DIOXIDE (test code = CO2) 26 mmol/L    21-32        N       

     

 

             ANION GAP (test code = GAP) 7.0 GAP calc 4.0-15.0     N            

 

             GLUCOSE (test code = GLU) 103 MG/DL           N            

 

             BLOOD UREA NITROGEN (test code = 37 MG/DL     7-18         H       

     



             BUN)                                                

 

             GLOMERULAR FILTRATION RATE (test 6 estGFR     >60          L       

     



             code = GFR)                                         

 

             CREATININE (test code = CREAT) 7.4 MG/DL    0.6-1.0      H         

   

 

             CALCIUM (test code = CA) 10.1 MG/DL   8.5-10.1     N            



HEPATIC FUNCTION [id number] 13:32:00





             Test Item    Value        Reference Range Interpretation Comments

 

             TOTAL PROTEIN (test code = PROT) 7.7 G/DL     6.4-8.2      N       

     

 

             ALBUMIN (test code = ALB) 3.4 G/DL     3.4-5.0      N            

 

             BILIRUBIN TOTAL (test code = BILT) 2.10 MG/DL   0.2-1.2      H     

       

 

             BILIRUBIN DIRECT (test code = 0.50 MG/DL   0.00-0.30    H          

  



             BILD)                                               

 

             BILIRUBIN INDIRECT (test code = 1.60 MG/DL   0.2-1.2      H        

    



             BILIND)                                             

 

             SGOT/AST (test code = AST) 17 Unit/L    15-37        N            

 

             SGPT/ALT (test code = ALT) 27 Unit/L    12-78        N            

 

             ALKALINE PHOSPHATASE TOTAL (test 222 Unit/L          H       

     



             code = ALKP)                                        



[id number] 13:32:00





             Test Item    Value        Reference Range Interpretation Comments

 

             LIPASE (test code = LIP) 109 Unit/L   114-286      L            



[id number] 18:01:00





             Test Item    Value        Reference Range Interpretation Comments

 

             SURGICAL (test --------------------------------                    

       



             code = SR)   --------------------------------                      

     



                          ----------------------------RUN                       

    



                          DATE: 22  [facility] PAGE 1 RUN TIME:                       

    



                          1801 Specimen Inquiry RUN USER:                       

    



                          INTERFACE                              



                          --------------------------------                      

     



                          --------------------------------                      

     



                          ----------------------------KYLE                      

     



                          ENT: SUZI SEARS ACCT #:                          

 



                          [id number] LOC: L.END U #:                          

 



                          [id number] AGE/SX: 66/F ROOM:                         

  



                          RE22REG DR:                           



                          Clark Cuellar MD :                           



                          57 BED: DIS:  STATUS: DEP                       

    



                          [facility] [place] [place] TLOC:                              



                          --------------------------------                      

     



                          --------------------------------                      

     



                          ----------------------------                          

 



                          SPEC #: 22:PMC: RECD:                           



                           STATUS: VERONICA LEYVA                        

   



                          #: [id number] SOFIA:                        

    



                          [facility] DR: Clark Cuellar MD                          

 



                          ENTERED:  SP TYPE:                       

    



                          SURGICAL  OTHR DR:                           



                          Charisse Green MD ORDERED:                         

  



                          98206/2, 50425, 72549, 33764,                         

  



                          ANATOMIC SPEC, SPECIMEN TRACK                         

  



                          COPIES TO: Charisse Green MD                       

    



                          [address] [place] [address] [phone]                           



                          [place] [address]                         

  



                          [phone] PROCEDURES: [id number]                        

   



                          () 48106                           



                          () 87364                           



                          () 00454                           



                          () SPECIMEN TRACK                        

   



                          () TISSUES: A.                           



                          GASTRIC MUCOSA - BX ANTRUM AND                        

   



                          BODY B. ESOPHAGUS BIOPSY -                           



                          DISTAI ESOPHAGUS BX FINAL                           



                          DIAGNOSIS A. Stomach, antrum and                      

     



                          body, endoscopic biopsy:-                           



                          Healing/reparative/chemical                           



                          gastropathy changes, minimal to                       

    



                          mild- Negative for intestinal                         

  



                          metaplasia- Negative for                           



                          Helicobacter pylori                           



                          (Immunohistochemistry stain) B.                       

    



                          Esophagus, distal, endoscopic                         

  



                          biopsy:- Reflux esophagitis,                          

 



                          mild- Negative for glandular                          

 



                          epithelium/intestinal                           



                          metaplasia/dysplasia (Alcian                          

 



                          Blue stain)- Negative for fungal                      

     



                          organisms (PAS stain) Comment:                        

   



                          Suggest clinical correlation.                         

  



                          Note: For each marker stain                           



                          tested above: Positive control                        

   



                          is positive and                           



                          Negative(external or internal)                        

   



                          control is negative (staining                         

  



                          performed at [facility]).                       

    



                          ** CONTINUED ON NEXT PAGE **                          

 



                          --------------------------------                      

     



                          --------------------------------                      

     



                          ----------------------------RUN                       

    



                          DATE: 22 [facility] - LAB PAGE 2 RUN TIME:                       

    



                          1801 Specimen Inquiry RUN USER:                       

    



                          INTERFACE                              



                          --------------------------------                      

     



                          --------------------------------                      

     



                          ----------------------------SPEC                      

     



                          #: 22:PMC: PATIENT:                           



                          SUZI SEARS #[id number]                         

  



                          (Continued)---------------------                      

     



                          --------------------------------                      

     



                          --------------------------------                      

     



                          ------- GROSS DESCRIPTION A.                          

 



                          Antrum and body biopsy. It                           



                          consists of 4 tissue fragments                        

   



                          measuring 2-5 mm. All as A1. B.                       

    



                          Distal esophageal biopsy. It                          

 



                          consists of 2 tissue fragments                        

   



                          measuring 3 mm each. Allas B1.                        

   



                          Technical component performed at                      

     



                          [employer],[id number] ,                        

   



                          [place] [address] Unless gross                         

  



                          only, the diagnosis is based                          

 



                          upon microscopic                           



                          examination.Immunohistochemistry                      

     



                          : This test was developed and                         

  



                          its performancecharacteristics                        

   



                          determined by this laboratory.                        

   



                          It has not been approved nordoes                      

     



                          it need approval by the [place] FDA.                       

    



                          Appropriate positive and                           



                          negative controlsare reviewed                         

  



                          and judged to be acceptable.                          

 



                          This laboratory is certified                          

 



                          underthe Clinical Laboratory                          

 



                          Improvement Amendments (CLIA-88)                      

     



                          as qualified toperform high                           



                          complexity clinical laboratory                        

   



                          testing. MICROSCOPIC DESCRIPTION                      

     



                          Findings are incorporated into                        

   



                          the diagnosis                           



                          section.------------------------                      

     



                          --------------------------------                      

     



                          --------------------------------                      

     



                          ---- Signed SIGNATURE ON Kye Zambrano 22 180                         

  



                          --------------------------------                      

     



                          --------------------------------                      

     



                          ----------------------------  **                      

     



                          END OF REPORT **                           



BASIC METABOLIC [id number] 08:13:00





             Test Item    Value        Reference Range Interpretation Comments

 

             SODIUM (test code = NA) 137 mmol/L   134-147      N            

 

             POTASSIUM (test code = K) 4.2 mmol/L   3.4-5.0      N            

 

             CHLORIDE (test code = CL) 105 mmol/L   100-108      N            

 

             CARBON DIOXIDE (test code = CO2) 23 mmol/L    21-32        N       

     

 

             ANION GAP (test code = GAP) 9.0 GAP calc 4.0-15.0     N            

 

             GLUCOSE (test code = GLU) 111 MG/DL           H            

 

             BLOOD UREA NITROGEN (test code = 41 MG/DL     7-18         H       

     



             BUN)                                                

 

             GLOMERULAR FILTRATION RATE (test 5 estGFR     >60          L       

     



             code = GFR)                                         

 

             CREATININE (test code = CREAT) 8.6 MG/DL    0.6-1.0      H         

   

 

             CALCIUM (test code = CA) 9.8 MG/DL    8.5-10.1     N            



- XR CHEST 2 -16-89 13:28:00
[facility] 1955 Sex: 
F************************************************************ Name: 
SUZI SEARS [place] : 1955 Age/S: 66 / F [id number] 
Unit #: [id number] Loc: [place] [address] Phys: Clark Cuellar MD  Acct: 
[id number] Dis Date: Status: PRE SDC PHONE #: [phone] Exam Date: 
2022 1253  FAX #: Reason: PRE OP EXAMS: CPT: [id number] XR CHEST 2 [id number] 
Fluoro Time: DAP (Gy m2): Air Kerma (mGy): Chest 2 views 2022 1:27 PM 
CLINICAL HISTORY: Preop  COMPARISON: None available LOCATION: W1 IMPRESSION: 
There is elevation of the right hemidiaphragm. Bibasilar opacities suggest 
atelectasis. Pneumonia should be excluded clinically. Cardiomediastinal contours
 are within normal limits. The central vasculature is not engorged. A tunneled 
left hemodialysis catheter is present. There are chronic appearing degenerative 
changes in the skeleton. ** Electronically Signed by M.D. Timothy Seipel ** **  
on 2022 at 1328 ** Reported and signed by: Timothy Seipel, M.D. CC: 
Charisse Green MD; Clark Cuellar MD PAGE 1 Signed Report Name: 
SUZI SEARS [place] : 1955 Age/S: 66 / F [id number] 
Unit #: [id number] Loc: [place] [address]  Phys: Clark Cuellar MD Acct: 
[id number] Dis Date: Status: PRE [facility] [place] [place] PHONE #: [phone] Exam Date: 
2022 1253 FAX #: Reason: PRE OP EXAMS: CPT:  [id number] XR CHEST 2 V 76636 
Fluoro Time: DAP (Gy m2): Air Kerma (mGy): (Continued) Technologist: Kylah Jacobo RT (R)(CT)  Trnscb Date/Time: 2022 (4214) [email or web address].[email or web address] Orig Print D/T:
 S: 2022 (0978)  PAGE 2 Signed ReportBASIC METABOLIC [id number] 
12:55:00





             Test Item    Value        Reference Range Interpretation Comments

 

             SODIUM (test code = NA) 134 mmol/L   134-147      N            

 

             POTASSIUM (test code = K) 4.6 mmol/L   3.4-5.0      N            

 

             CHLORIDE (test code = CL) 100 mmol/L   100-108      N            

 

             CARBON DIOXIDE (test code = CO2) 28 mmol/L    21-32        N       

     

 

             ANION GAP (test code = GAP) 6.0 GAP calc 4.0-15.0     N            

 

             GLUCOSE (test code = GLU) 113 MG/DL           H            

 

             BLOOD UREA NITROGEN (test code = 38 MG/DL     7-18         H       

     



             BUN)                                                

 

             GLOMERULAR FILTRATION RATE (test 6 estGFR     >60          L       

     



             code = GFR)                                         

 

             CREATININE (test code = CREAT) 7.0 MG/DL    0.6-1.0      H         

   

 

             CALCIUM (test code = CA) 10.5 MG/DL   8.5-10.1     H            



COVID 19 INHOUSE [id number] 12:52:00





             Test Item    Value        Reference Range Interpretation Comments

 

             COVID 19 INHOUSE AG NEGATIVE     Negative                  Per arthur

factmiguelr,



             (test code =                                        negative result

s should



             [id number])                                        be treated aspr

esumptive



                                                                 and, if inconsi

stent with



                                                                 clinical signs



                                                                 andsymptoms or 

necessary



                                                                 for patient man

agement,



                                                                 should betested

 with an



                                                                 alternative mol

ecular



                                                                 assay. Negative

 resultsdo



                                                                 not preclude SA

RS-CoV-2



                                                                 infection and s

hould not



                                                                 be usedas the s

ole basis



                                                                 for patient man

agement



                                                                 decisions. Nega

tive



                                                                 results should 

be



                                                                 considered in t

he context



                                                                 of apatient's r

ecent



                                                                 exposures, hist

ory,



                                                                 presence of cli

nicalsigns



                                                                 and symptoms co

nsistent



                                                                 with COVID-19.



PROTHROMBIN [id number] 12:44:00





             Test Item    Value        Reference Range Interpretation Comments

 

             PT PATIENT (test 12.2 SECONDS 9.3-12.9     N            



             code = PTP)                                         

 

             INTERNATIONAL NORMAL 1.07 INR Unit 0.8-1.2      N             TARGE

T INR BY



             RATIO (test code =                                        INDICATIO

N Indication



             INR)                                                INR1. Prophylax

is of



                                                                 venous thrombos

is 2.0



                                                                 - 3.0 (orthoped

ic



                                                                 surgery), Proph

ylaxis



                                                                 of venous throm

bosis



                                                                 (other than hig

h-risk



                                                                 surgery), Treat

ment of



                                                                 Deep Vein



                                                                 Thrombosis/Pulm

onary



                                                                 Embolism, Preve

ntion



                                                                 of systemic emb

olism -



                                                                 Tissue heart va

lves,



                                                                 Acute Myocardia

l



                                                                 Infarction (to 

prevent



                                                                 systemic emboli

sm),



                                                                 Valvular heart



                                                                 disease, Acute



                                                                 Myocardial Infa

rction



                                                                 (to prevent sys

temic



                                                                 embolism), Valv

ular



                                                                 heart disease, 

Atrial



                                                                 Fibrillation,



                                                                 Bileaflet mecha

nical



                                                                 valve in aortic



                                                                 position.2. Mec

hanical



                                                                 prosthetic valv

es



                                                                 (high risk), 2.

5 - 3.5



                                                                 Presence of Lup

us



                                                                 Anticoagulant o

r



                                                                 Antiphospholipi

d



                                                                 Antibodies, Pre

vention



                                                                 of systemic emb

olism -



                                                                 Acute Myocardia

l



                                                                 Infarction (to 

prevent



                                                                 recurrent infar

ct).



THROMBOPLASTIN TIME [id number] 12:44:00





             Test Item    Value        Reference Range Interpretation Comments

 

             THROMBOPLASTIN TIME PARTIAL 36.2 SECONDS 26-35        H            



             (test code = PTT)                                        



CBC W/AUTO [id number] 12:43:00





             Test Item    Value        Reference Range Interpretation Comments

 

             WHITE BLOOD CELL (test code = 9.1 K/mm3    3.5-11.0     N          

  



             WBC)                                                

 

             RED BLOOD CELL (test code = 4.16 M/mm3   4.70-6.10    L            



             RBC)                                                

 

             HEMOGLOBIN (test code = HGB) 13.1 G/DL    10.4-14.9    N           

 

 

             HEMATOCRIT (test code = HCT) 41.8 %       31.5-44.1    N           

 

 

             MEAN CELL VOLUME (test code = 100.5 Fl     84.5-98.6    H          

  



             MCV)                                                

 

             MEAN CELL HGB (test code = MCH) 31.5 pg      27.0-34.2    N        

    

 

             MEAN CELL HGB CONCETRATION 31.3 G/DL    31.5-34.0    L            



             (test code = MCHC)                                        

 

             RED CELL DISTRIBUTION WIDTH 13.8 SD      11.5-14.5    N            



             (test code = RDW)                                        

 

             PLATELET COUNT (test code = 140 K/mm3    150-450      L            



             PLT)                                                

 

             MEAN PLATELET VOLUME (test code 10.20 fL     7.0-10.5     N        

    



             = MPV)                                              

 

             NEUTROPHIL % (test code = NT%) 71.8 %       40-76        N         

   

 

             IMMATURE GRANULOCYTE % (test 1.3 %        0.0-5.0      N           

 



             code = IG%)                                         

 

             LYMPHOCYTE % (test code = LY%) 18.2 %       20.5-51.1    L         

   

 

             MONOCYTE % (test code = MO%) 6.7 %        1.7-9.3      N           

 

 

             EOSINOPHIL % (test code = EO%) 1.3 %        0.0-6.0      N         

   

 

             BASOPHIL % (test code = BA%) 0.7 %        0.0-2.0      N           

 

 

             NUCLEATED RBC % (test code = 0.0 /100WBC% 0.0-1.0      N           

 



             NRBC%)                                              

 

             NEUTROPHIL # (test code = NT#) 6.5 K/mm3    1.8-7.6      N         

   

 

             IMMATURE GRANULOCYTE # (test 0.12 x10 3/uL 0.00-0.03    H          

  



             code = IG#)                                         

 

             LYMPHOCYTE # (test code = LY#) 1.7 K/mm3    0.6-3.2      N         

   

 

             MONOCYTE # (test code = MO#) 0.6 K/mm3    0.3-1.1      N           

 

 

             EOSINOPHIL # (test code = EO#) 0.1 K/mm3    0.0-0.4      N         

   

 

             BASOPHIL # (test code = BA#) 0.1 K/mm3    0.0-0.1      N           

 

 

             NUCLEATED RBC # (test code = 0.0 K/mm3    0.0-0.1      N           

 



             NRBC#)                                              

 

             MANUAL DIFF REQUIRED (test code NO DIFF/SCN  CRITERIA              

    



             = MDIFF)                                            



SARS-CoV-2 (COVID-19) RNA [Presence] in Respiratory specimen by EDWARD with probe 
[id number] 23:10:10





             Test Item    Value        Reference Range Interpretation Comments

 

             SARS coronavirus RNA [Presence] Not detected                       

    



             in Isolate by EDWARD with probe                                       

 



             detection (test code = 71595-6)                                    

    

 

             Whether patient is employed in a No                                

     



             healthcare setting (test code =                                    

    



             74789-7)                                            

 

             Whether the patient has symptoms Yes                               

     



             related to condition of interest                                   

     



             (test code = 86071-3)                                        

 

             Whether the patient was No                                     



             hospitalized for condition of                                      

  



             interest (test code = 40425-6)                                     

   

 

             Whether the patient was admitted No                                

     



             to intensive care unit (ICU) for                                   

     



             condition of interest (test code                                   

     



             = 80806-0)                                          

 

             Whether patient resides in a No                                    

 



             congregate care setting (test                                      

  



             code = 54372-4)                                        

 

             Pregnancy status (test code = No                                   

  



             16304-3)                                            

 

             Date and time of symptom onset Unknown                             

   



             (test code = 38270-9)                                        



SARS-CoV-2 (COVID-19) RNA [Presence] in Respiratory specimen by EDWARD with probe 
[id number] 23:10:10





             Test Item    Value        Reference Range Interpretation Comments

 

             SARS-CoV-2 (COVID-19) RNA Not detected                           



             [Presence] in Respiratory                                        



             specimen by EDWARD with probe                                        



             detection (test code = 08081-7)                                    

    

 

             Whether patient is employed in a No                                

     



             healthcare setting (test code =                                    

    



             70871-8)                                            

 

             Whether the patient has symptoms Yes                               

     



             related to condition of interest                                   

     



             (test code = 99449-3)                                        

 

             Whether the patient was No                                     



             hospitalized for condition of                                      

  



             interest (test code = 64099-5)                                     

   

 

             Whether the patient was admitted No                                

     



             to intensive care unit (ICU) for                                   

     



             condition of interest (test code                                   

     



             = 89107-0)                                          

 

             Whether patient resides in a No                                    

 



             congregate care setting (test                                      

  



             code = 79221-0)                                        

 

             Pregnancy status (test code = No                                   

  



             40095-3)                                            

 

             Date and time of symptom onset Unknown                             

   



             (test code = 27411-3)                                        



SARS-CoV-2 (COVID-19) RNA [Presence] in Respiratory specimen by EDWARD with probe 
[id number] 22:34:30





             Test Item    Value        Reference Range Interpretation Comments

 

             SARS-CoV-2 (COVID-19) RNA Not detected Not-Detected              



             [Presence] in Respiratory                                        



             specimen by EDWARD with probe                                        



             detection (test code = 36182-8)                                    

    

 

             Whether patient is employed in a                                   

     



             healthcare setting (test code =                                    

    



             13085-9)                                            

 

             Whether the patient has symptoms                                   

     



             related to condition of interest                                   

     



             (test code = 55690-6)                                        

 

             Patient was hospitalized because                                   

     



             of this condition (test code =                                     

   



             28736-9)                                            

 

             Whether the patient was admitted                                   

     



             to intensive care unit (ICU) for                                   

     



             condition of interest (test code                                   

     



             = 33528-4)                                          

 

             Whether patient resides in a                                       

 



             congregate care setting (test                                      

  



             code = 76306-6)                                        



SARS-CoV-2 (COVID-19) RNA [Presence] in Respiratory specimen by EDWARD with probe 
[id number] 22:35:42





             Test Item    Value        Reference Range Interpretation Comments

 

             SARS-CoV-2 (COVID-19) RNA Not detected Not-Detected              



             [Presence] in Respiratory                                        



             specimen by EDWARD with probe                                        



             detection (test code = 65636-0)                                    

    

 

             Whether patient is employed in a                                   

     



             healthcare setting (test code =                                    

    



             41846-1)                                            

 

             Whether the patient has symptoms                                   

     



             related to condition of interest                                   

     



             (test code = 28484-7)                                        

 

             Patient was hospitalized because                                   

     



             of this condition (test code =                                     

   



             54939-7)                                            

 

             Whether the patient was admitted                                   

     



             to intensive care unit (ICU) for                                   

     



             condition of interest (test code                                   

     



             = 69167-2)                                          

 

             Whether patient resides in a                                       

 



             congregate care setting (test                                      

  



             code = 14064-3)                                        



SARS-CoV-2 (COVID-19) RNA [Presence] in Respiratory specimen by EDWARD with probe 
[id number] 22:46:10





             Test Item    Value        Reference Range Interpretation Comments

 

             SARS-CoV-2 (COVID-19) RNA Not detected Not-Detected              



             [Presence] in Respiratory                                        



             specimen by EDWARD with probe                                        



             detection (test code = 04461-1)                                    

    

 

             Whether patient is employed in a                                   

     



             healthcare setting (test code =                                    

    



             48481-3)                                            

 

             Whether the patient has symptoms                                   

     



             related to condition of interest                                   

     



             (test code = 08700-0)                                        

 

             Patient was hospitalized because                                   

     



             of this condition (test code =                                     

   



             04471-7)                                            

 

             Whether the patient was admitted                                   

     



             to intensive care unit (ICU) for                                   

     



             condition of interest (test code                                   

     



             = 75085-3)                                          

 

             Whether patient resides in a                                       

 



             congregate care setting (test                                      

  



             code = 43262-4)                                        



SARS-CoV-2 (COVID-19) RNA [Presence] in Respiratory specimen by EDWARD with probe 
[id number] 01:54:24





             Test Item    Value        Reference Range Interpretation Comments

 

             SARS-CoV-2 (COVID-19) RNA Not detected Not-Detected              



             [Presence] in Respiratory                                        



             specimen by EDWARD with probe                                        



             detection (test code = 66383-7)                                    

    

 

             Whether patient is employed in a                                   

     



             healthcare setting (test code =                                    

    



             98076-2)                                            

 

             Whether the patient has symptoms                                   

     



             related to condition of interest                                   

     



             (test code = 17080-3)                                        

 

             Patient was hospitalized because                                   

     



             of this condition (test code =                                     

   



             31819-8)                                            

 

             Whether the patient was admitted                                   

     



             to intensive care unit (ICU) for                                   

     



             condition of interest (test code                                   

     



             = 65403-1)                                          

 

             Whether patient resides in a                                       

 



             congregate care setting (test                                      

  



             code = 78431-0)                                        



SARS-CoV-2 (COVID-19) RNA [Presence] in Respiratory specimen by EDWARD with probe 
[id number] 21:44:06





             Test Item    Value        Reference Range Interpretation Comments

 

             SARS-CoV-2 (COVID-19) RNA Not detected Not-Detected              



             [Presence] in Respiratory                                        



             specimen by EDWARD with probe                                        



             detection (test code = 17787-0)                                    

    

 

             Whether patient is employed in a                                   

     



             healthcare setting (test code =                                    

    



             95152-0)                                            

 

             Whether the patient has symptoms                                   

     



             related to condition of interest                                   

     



             (test code = 49427-6)                                        

 

             Patient was hospitalized because                                   

     



             of this condition (test code =                                     

   



             74807-3)                                            

 

             Whether the patient was admitted                                   

     



             to intensive care unit (ICU) for                                   

     



             condition of interest (test code                                   

     



             = 15949-7)                                          

 

             Whether patient resides in a                                       

 



             congregate care setting (test                                      

  



             code = 89064-6)                                        



SARS-CoV-2 (COVID-19) RNA [Presence] in Respiratory specimen by EDWARD with probe 
[id number] 00:36:59





             Test Item    Value        Reference Range Interpretation Comments

 

             SARS-CoV-2 (COVID-19) RNA Not detected Not-Detected              



             [Presence] in Respiratory                                        



             specimen by EDWARD with probe                                        



             detection (test code = 50929-5)                                    

    



SARS-CoV-2 (COVID-19) RNA [Presence] in Respiratory specimen by EDWARD with probe 
[id number] 17:35:35





             Test Item    Value        Reference Range Interpretation Comments

 

             SARS-CoV-2 (COVID-19) RNA Not detected Not-Detected              



             [Presence] in Respiratory                                        



             specimen by EDWARD with probe                                        



             detection (test code = 17101-2)                                    

    



SARS-CoV-2 (COVID-19) RNA [Presence] in Respiratory specimen by EDWARD with probe 
[id number] 18:03:30





             Test Item    Value        Reference Range Interpretation Comments

 

             SARS-CoV-2 (COVID-19) RNA Not detected Not-Detected              



             [Presence] in Respiratory                                        



             specimen by EDWARD with probe                                        



             detection (test code = 02550-1)                                    

    



SARS-CoV-2 (COVID-19) RNA [Presence] in Respiratory specimen by EDWARD with probe 
[id number] 10:06:03





             Test Item    Value        Reference Range Interpretation Comments

 

             SARS-CoV-2 (COVID-19) RNA Not detected Not-Detected              



             [Presence] in Respiratory                                        



             specimen by EDWARD with probe                                        



             detection (test code = 89615-1)                                    

    



SARS-CoV-2 (COVID-19) RNA [Presence] in Respiratory specimen by EDWARD with probe 
[id number] 05:11:58





             Test Item    Value        Reference Range Interpretation Comments

 

             SARS-CoV-2 (COVID-19) RNA Not detected Not-Detected              



             [Presence] in Respiratory                                        



             specimen by EDWARD with probe                                        



             detection (test code = 18587-6)                                    

    



SARS-CoV-2 (COVID-19) RNA [Presence] in Respiratory specimen by EDWARD with probe 
[id number] 03:34:16





             Test Item    Value        Reference Range Interpretation Comments

 

             SARS-CoV-2 (COVID-19) RNA Not detected Not-Detected              



             [Presence] in Respiratory                                        



             specimen by EDWARD with probe                                        



             detection (test code = 93483-3)                                    

    



SARS-CoV-2 (COVID-19) RNA [Presence] in Respiratory specimen by EDWARD with probe 
[id number] 10:06:41





             Test Item    Value        Reference Range Interpretation Comments

 

             SARS-CoV-2 (COVID-19) RNA Not detected Not-Detected              



             [Presence] in Respiratory                                        



             specimen by EDWARD with probe                                        



             detection (test code = 67713-8)                                    

    



[facility]2020-10-14 12:33:00





             Test Item    Value        Reference Range Interpretation Comments

 

             Chol (test code = Chol) 129                                    



[facility]





             Test Item    Value        Reference Range Interpretation Comments

 

             HDL (test code = HDL) 37                                     



[facility]





             Test Item    Value        Reference Range Interpretation Comments

 

             Trig (test code = Trig) 76                                     



[facility]





             Test Item    Value        Reference Range Interpretation Comments

 

             LDL (Calculated) (test code = LDL 76                               

      



             (Calculated))                                        



[facility]





             Test Item    Value        Reference Range Interpretation Comments

 

             CHD Risk (test code = CHD Risk) 3.5                                

    



[facility]





             Test Item    Value        Reference Range Interpretation Comments

 

             Non HDL Chol (test code = Non HDL Chol) 92                         

            



[facility]





             Test Item    Value        Reference Range Interpretation Comments

 

             Chol (test code = Chol) 129                                    



[facility]





             Test Item    Value        Reference Range Interpretation Comments

 

             HDL (test code = HDL) 37                                     



[facility]





             Test Item    Value        Reference Range Interpretation Comments

 

             Trig (test code = Trig) 76                                     



[facility]





             Test Item    Value        Reference Range Interpretation Comments

 

             LDL (Calculated) (test code = LDL 76                               

      



             (Calculated))                                        



[facility]





             Test Item    Value        Reference Range Interpretation Comments

 

             CHD Risk (test code = CHD Risk) 3.5                                

    



[facility]





             Test Item    Value        Reference Range Interpretation Comments

 

             Non HDL Chol (test code = Non HDL Chol) 92                         

            



[place] 902330-08-06 14:47:00





             Test Item    Value        Reference Range Interpretation Comments

 

             Vitamin D, 25-OH, Total (test code = 37                      

         



             Vitamin D, 25-OH, Total)                                        



[place] 902330-08-06 14:47:00





             Test Item    Value        Reference Range Interpretation Comments

 

             U Creat mg/dL (test code = U Creat 114                       

       



             mg/dL)                                              



[place] 902330-08-06 14:47:00





             Test Item    Value        Reference Range Interpretation Comments

 

             U Prot/Creat (test code = U Prot/Creat) 430                  

            



[place] 902330-08-06 14:47:00





             Test Item    Value        Reference Range Interpretation Comments

 

             U Prot/Creat (test code = U 0.430 1      0.021-0.161               



             Prot/Creat)                                         



[place] 902330-08-06 14:47:00





             Test Item    Value        Reference Range Interpretation Comments

 

             U Protein (test code = U Protein) 49           5-24                

      



[place] 902330-08-06 14:47:00





             Test Item    Value        Reference Range Interpretation Comments

 

             Glucose Lvl (test code = Glucose Lvl) 103          65-99           

          



[place] 902330-08-06 14:47:00





             Test Item    Value        Reference Range Interpretation Comments

 

             BUN (test code = BUN) 24           7-25                      



[place] 902330-08-06 14:47:00





             Test Item    Value        Reference Range Interpretation Comments

 

             Vitamin D, 25-OH, Total (test code = 37                      

         



             Vitamin D, 25-OH, Total)                                        



[place] 902330-08-06 14:47:00





             Test Item    Value        Reference Range Interpretation Comments

 

             U Creat mg/dL (test code = U Creat 114                       

       



             mg/dL)                                              



[place] 902330-08-06 14:47:00





             Test Item    Value        Reference Range Interpretation Comments

 

             Creatinine Lvl (test code = Creatinine 1.32         0.50-0.99      

           



             Lvl)                                                



[place] 902330-08-06 14:47:00





             Test Item    Value        Reference Range Interpretation Comments

 

             U Prot/Creat (test code = U Prot/Creat) 430                  

            



[facility]2020-08-06 14:47:00





             Test Item    Value        Reference Range Interpretation Comments

 

             U Prot/Creat (test code = U 0.430 1      0.021-0.161               



             Prot/Creat)                                         



[facility]2020-08-06 14:47:00





             Test Item    Value        Reference Range Interpretation Comments

 

             U Protein (test code = U Protein) 49           5-24                

      



[place] 902330-08-06 14:47:00





             Test Item    Value        Reference Range Interpretation Comments

 

             Glucose Lvl (test code = Glucose Lvl) 103          65-99           

          



[place] 902330-08-06 14:47:00





             Test Item    Value        Reference Range Interpretation Comments

 

             BUN (test code = BUN) 24           7-25                      



[facility]2020-08-06 14:47:00





             Test Item    Value        Reference Range Interpretation Comments

 

             Creatinine Lvl (test code = Creatinine 1.32         0.50-0.99      

           



             Lvl)                                                



[facility]2020-08-06 14:47:00





             Test Item    Value        Reference Range Interpretation Comments

 

             eGFR NON-AFR. AMERICAN (test code = 43                             

        



             eGFR NON-AFR. AMERICAN)                                        



[facility]2020-08-06 14:47:00





             Test Item    Value        Reference Range Interpretation Comments

 

             eGFR  (test code = eGFR 50                         

            



             )                                        



[facility]2020-08-06 14:47:00





             Test Item    Value        Reference Range Interpretation Comments

 

             B/C Ratio (test code = B/C Ratio) 18           6-22                

      



[facility]2020-08-06 14:47:00





             Test Item    Value        Reference Range Interpretation Comments

 

             Sodium Lvl (test code = Sodium Lvl) 138          135-146           

        



[facility]2020-08-06 14:47:00





             Test Item    Value        Reference Range Interpretation Comments

 

             Potassium Lvl (test code = Potassium 4.4          3.5-5.3          

         



             Lvl)                                                



[facility]2020-08-06 14:47:00





             Test Item    Value        Reference Range Interpretation Comments

 

             Chloride Lvl (test code = Chloride Lvl) 102                  

            



[place] 902330-08-06 14:47:00





             Test Item    Value        Reference Range Interpretation Comments

 

             CO2 (test code = CO2) 30           20-32                     



[place] 902330-08-06 14:47:00





             Test Item    Value        Reference Range Interpretation Comments

 

             Calcium Lvl (test code = Calcium Lvl) 9.4          8.6-10.4        

          



[facility]2020-08-06 14:47:00





             Test Item    Value        Reference Range Interpretation Comments

 

             Phosphorus (test code = Phosphorus) 4.8          2.5-4.5           

        



[place] 902330-08-06 14:47:00





             Test Item    Value        Reference Range Interpretation Comments

 

             Albumin Lvl (test code = Albumin Lvl) 3.9          3.6-5.1         

          



[place] 221760-08-06 14:47:00





             Test Item    Value        Reference Range Interpretation Comments

 

             Plt Count Estimated (test code = DECREASED                         

     



             Plt Count Estimated)                                        



[facility]





             Test Item    Value        Reference Range Interpretation Comments

 

             WBC X 10x3 (test code = WBC X 10x3) 7.2          3.8-10.8          

        



[place] [address]-08-06 14:47:00





             Test Item    Value        Reference Range Interpretation Comments

 

             RBC X 10x6 (test code = RBC X 10x6) 3.71         3.80-5.10         

        



[place] [address]-08-06 14:47:00





             Test Item    Value        Reference Range Interpretation Comments

 

             Hgb (test code = Hgb) 10.7         11.7-15.5                 



[facility]2020-08-06 14:47:00





             Test Item    Value        Reference Range Interpretation Comments

 

             eGFR NON-AFR. AMERICAN (test code = 43                             

        



             eGFR NON-AFR. AMERICAN)                                        



[facility]lblyxgXWDDANOFKH7638-70-49 14:47:00





             Test Item    Value        Reference Range Interpretation Comments

 

             Hct (test code = Hct) 33.9         35.0-45.0                 



[place] 221760-08-06 14:47:00





             Test Item    Value        Reference Range Interpretation Comments

 

             MCV (test code = MCV) 91.4         80.0-100.0                



[place] [address]-08-06 14:47:00





             Test Item    Value        Reference Range Interpretation Comments

 

             MCH (test code = MCH) 28.8 pg      27.0-33.0                 



[place] [address]-08-06 14:47:00





             Test Item    Value        Reference Range Interpretation Comments

 

             MCHC (test code = MCHC) 31.6         32.0-36.0                 



[place] 221760-08-06 14:47:00





             Test Item    Value        Reference Range Interpretation Comments

 

             RDW (test code = RDW) 13.9         11.0-15.0                 



[facility]ulxjmdIMXCLNHBDK8246-72-51 14:47:00





             Test Item    Value        Reference Range Interpretation Comments

 

             Platelet (test code = Platelet) 110          140-400               

    



[facility] HospitalZddrklyLKEUPFZPYL1205-85-87 14:47:00





             Test Item    Value        Reference Range Interpretation Comments

 

             MPV (test code = MPV) 11.7         7.5-12.5                  



[facility] HospitalZfkuarnIEFECYEIUF2514-62-89 14:47:00





             Test Item    Value        Reference Range Interpretation Comments

 

             Neutrophils # (test code = Neutrophils 5414         3645-2912      

           



             #)                                                  



[facility] HospitalYsclgxcFIIFWGKYZR8164-59-15 14:47:00





             Test Item    Value        Reference Range Interpretation Comments

 

             Lymphocytes # (test code = Lymphocytes 1152         850-3900       

           



             #)                                                  



[facility] HospitalAjwhlbqNJPVWVPXMQ1930-89-74 14:47:00





             Test Item    Value        Reference Range Interpretation Comments

 

             Monocytes # (test code = Monocytes #) 461          200-950         

          



[facility]2020-08-06 14:47:00





             Test Item    Value        Reference Range Interpretation Comments

 

             eGFR  (test code = eGFR 50                         

            



             )                                        



[facility] HospitalDwmesqwEZEULJCLRV8454-75-44 14:47:00





             Test Item    Value        Reference Range Interpretation Comments

 

             Eosinophils # (test code = Eosinophils 122                   

           



             #)                                                  



[facility] HospitalCgnvdboGOTRLUFLWM9266-84-01 14:47:00





             Test Item    Value        Reference Range Interpretation Comments

 

             Basophils # (test code 50           See_Comment                [Aut

omated message] The



             = Basophils #)                                        system which 

generated



                                                                 this result tra

nsmitted



                                                                 reference range

: <=200.



                                                                 The reference r

cheyenne was



                                                                 not used to int

erpret



                                                                 this result as



                                                                 normal/abnormal

.



[facility] HospitalPqyicyxMYDSGXNEZM4911-45-08 14:47:00





             Test Item    Value        Reference Range Interpretation Comments

 

             Segs (test code = Segs) 75.2                                   



[facility] HospitalMzyhtvgJOHTYAIETK9232-08-02 14:47:00





             Test Item    Value        Reference Range Interpretation Comments

 

             Lymphocytes (test code = Lymphocytes) 16.0                         

          



[place] 221760-08-06 14:47:00





             Test Item    Value        Reference Range Interpretation Comments

 

             Monocytes (test code = Monocytes) 6.4                              

      



[place] [address]-08-06 14:47:00





             Test Item    Value        Reference Range Interpretation Comments

 

             Eosinophils (test code = Eosinophils) 1.7                          

          



[place] [address]-08-06 14:47:00





             Test Item    Value        Reference Range Interpretation Comments

 

             Basophils (test code = Basophils) 0.7                              

      



[facility] [id number] 14:47:00





             Test Item    Value        Reference Range Interpretation Comments

 

             Result 2 (Urine Culture) See Result Comment                        

   



             (test code = Result 2                                        



             (Urine Culture))                                        



[facility] [id number] 14:47:00





             Test Item    Value        Reference Range Interpretation Comments

 

             UA Color (test code = UA Color) YELLOW                             

    



[facility] [id number] 14:47:00





             Test Item    Value        Reference Range Interpretation Comments

 

             UA Turbidity (test code = UA CLOUDY                                

 



             Turbidity)                                          



[facility] [id number] 14:47:00





             Test Item    Value        Reference Range Interpretation Comments

 

             B/C Ratio (test code = B/C Ratio) 18           6-22                

      



[facility] [id number] 14:47:00





             Test Item    Value        Reference Range Interpretation Comments

 

             UA Spec Grav (test code = UA Spec 1.017 1      1.001-1.035         

      



             Grav)                                               



[facility] [id number] 14:47:00





             Test Item    Value        Reference Range Interpretation Comments

 

             UA pH (test code = UA pH) 5.5 1        5.0-8.0                   



[facility] [id number] 14:47:00





             Test Item    Value        Reference Range Interpretation Comments

 

             UA Glucose (test code = UA Glucose) NEGATIVE                       

        



[facility] [id number] 14:47:00





             Test Item    Value        Reference Range Interpretation Comments

 

             UA Bili (test code = UA Bili) NEGATIVE                             

  



[facility] [id number] 14:47:00





             Test Item    Value        Reference Range Interpretation Comments

 

             UA Ketones (test code = UA Ketones) NEGATIVE                       

        



[facility] [id number] 14:47:00





             Test Item    Value        Reference Range Interpretation Comments

 

             UA Blood (test code = UA Blood) 1+                                 

    



[facility] [id number] 14:47:00





             Test Item    Value        Reference Range Interpretation Comments

 

             UA Protein (test code = UA Protein) 1+                             

        



[facility] [id number] 14:47:00





             Test Item    Value        Reference Range Interpretation Comments

 

             UA Nitrite (test code = UA Nitrite) POSITIVE                       

        



[facility] [id number] 14:47:00





             Test Item    Value        Reference Range Interpretation Comments

 

             UA Leuk Est (test code = UA Leuk Est) 2+                           

          



[facility] AND [id number] 14:47:00





             Test Item    Value        Reference Range Interpretation Comments

 

             UA WBC (test code = UA WBC) > OR = 60                              



[facility]2020-08-06 14:47:00





             Test Item    Value        Reference Range Interpretation Comments

 

             Sodium Lvl (test code = Sodium Lvl) 138          135-146           

        



[facility] AND [id number] 14:47:00





             Test Item    Value        Reference Range Interpretation Comments

 

             UA RBC (test code = UA RBC) 0-2                                    



[facility] AND [id number] 14:47:00





             Test Item    Value        Reference Range Interpretation Comments

 

             UA Sq Epi (test code = UA Sq Epi) NONE SEEN                        

      



[facility] AND [id number] 14:47:00





             Test Item    Value        Reference Range Interpretation Comments

 

             UA Bacteria (test code = UA Bacteria) MANY                         

          



[facility] AND [id number] 14:47:00





             Test Item    Value        Reference Range Interpretation Comments

 

             UA Hyal Cast (test code = UA Hyal NONE SEEN                        

      



             Cast)                                               



[facility] AND [id number] 14:47:00





             Test Item    Value        Reference Range Interpretation Comments

 

             UA Reflex (test code CULTURE INDICATED -                           



             = UA Reflex) RESULTS TO FOLLOW                           



[facility]2020-08-06 14:47:00





             Test Item    Value        Reference Range Interpretation Comments

 

             U Creat mg/dL (test code = U Creat 114                       

       



             mg/dL)                                              



[facility]2020-08-06 14:47:00





             Test Item    Value        Reference Range Interpretation Comments

 

             U Alb (test code = U Alb) 16.7                                   



[facility]2020-08-06 14:47:00





             Test Item    Value        Reference Range Interpretation Comments

 

             U Alb/Crea (test code = U Alb/Crea) 146                            

        



[facility]2020-08-06 14:47:00





             Test Item    Value        Reference Range Interpretation Comments

 

             Potassium Lvl (test code = Potassium 4.4          3.5-5.3          

         



             Lvl)                                                



[facility]2020-08-06 14:47:00





             Test Item    Value        Reference Range Interpretation Comments

 

             Chloride Lvl (test code = Chloride Lvl) 102                  

            



[facility]2020-08-06 14:47:00





             Test Item    Value        Reference Range Interpretation Comments

 

             CO2 (test code = CO2) 30           20-32                     



[facility]2020-08-06 14:47:00





             Test Item    Value        Reference Range Interpretation Comments

 

             Calcium Lvl (test code = Calcium Lvl) 9.4          8.6-10.4        

          



[place] 902330-08-06 14:47:00





             Test Item    Value        Reference Range Interpretation Comments

 

             Phosphorus (test code = Phosphorus) 4.8          2.5-4.5           

        



[facility]2020-08-06 14:47:00





             Test Item    Value        Reference Range Interpretation Comments

 

             Albumin Lvl (test code = Albumin Lvl) 3.9          3.6-5.1         

          



[place] 221760-08-06 14:47:00





             Test Item    Value        Reference Range Interpretation Comments

 

             Plt Count Estimated (test code = DECREASED                         

     



             Plt Count Estimated)                                        



[place] 221760-08-06 14:47:00





             Test Item    Value        Reference Range Interpretation Comments

 

             WBC X 10x3 (test code = WBC X 10x3) 7.2          3.8-10.8          

        



[place] [address]-08-06 14:47:00





             Test Item    Value        Reference Range Interpretation Comments

 

             RBC X 10x6 (test code = RBC X 10x6) 3.71         3.80-5.10         

        



[place] [address]-08-06 14:47:00





             Test Item    Value        Reference Range Interpretation Comments

 

             Hgb (test code = Hgb) 10.7         11.7-15.5                 



[place] [address]-08-06 14:47:00





             Test Item    Value        Reference Range Interpretation Comments

 

             Hct (test code = Hct) 33.9         35.0-45.0                 



[place] [address]-08-06 14:47:00





             Test Item    Value        Reference Range Interpretation Comments

 

             MCV (test code = MCV) 91.4         80.0-100.0                



[place] [address]-08-06 14:47:00





             Test Item    Value        Reference Range Interpretation Comments

 

             MCH (test code = MCH) 28.8 pg      27.0-33.0                 



[place] [address]-08-06 14:47:00





             Test Item    Value        Reference Range Interpretation Comments

 

             MCHC (test code = MCHC) 31.6         32.0-36.0                 



[place] [address]-08-06 14:47:00





             Test Item    Value        Reference Range Interpretation Comments

 

             RDW (test code = RDW) 13.9         11.0-15.0                 



[id number]47[facility]





             Test Item    Value        Reference Range Interpretation Comments

 

             Platelet (test code = Platelet) 110          140-400               

    



[facility] CenterIibrptoUXQVCZRBMR7665-45-31 14:47:00





             Test Item    Value        Reference Range Interpretation Comments

 

             MPV (test code = MPV) 11.7         7.5-12.5                  



[facility] CenterLjkkiueLWGDGXVJFD4120-08-93 14:47:00





             Test Item    Value        Reference Range Interpretation Comments

 

             Neutrophils # (test code = Neutrophils 5414         3799-5650      

           



             #)                                                  



[facility] CenterIvqxxkzQGUXEJJDJX8186-18-20 14:47:00





             Test Item    Value        Reference Range Interpretation Comments

 

             Lymphocytes # (test code = Lymphocytes 1152         850-3900       

           



             #)                                                  



[facility] CenterQhkthrsFRNDIMFLRM2656-13-10 14:47:00





             Test Item    Value        Reference Range Interpretation Comments

 

             Monocytes # (test code = Monocytes #) 461          200-950         

          



[facility] CenterLfjcvijMAAEYQSAQK3505-81-44 14:47:00





             Test Item    Value        Reference Range Interpretation Comments

 

             Eosinophils # (test code = Eosinophils 122                   

           



             #)                                                  



[facility] CenterLkrqqjpBVVFQQYCUS4105-90-78 14:47:00





             Test Item    Value        Reference Range Interpretation Comments

 

             Basophils # (test code 50           See_Comment                [Aut

omated message] The



             = Basophils #)                                        system which 

generated



                                                                 this result tra

nsmitted



                                                                 reference range

: <=200.



                                                                 The reference r

cheyenne was



                                                                 not used to int

erpret



                                                                 this result as



                                                                 normal/abnormal

.



[facility]btkercTGFFHXAPVU6049-88-12 14:47:00





             Test Item    Value        Reference Range Interpretation Comments

 

             Segs (test code = Segs) 75.2                                   



[facility] CenterPfyekvkMRHFILGJAY4792-35-09 14:47:00





             Test Item    Value        Reference Range Interpretation Comments

 

             Lymphocytes (test code = Lymphocytes) 16.0                         

          



[facility] CenterTksnurzGIFVWWUPCD0867-66-27 14:47:00





             Test Item    Value        Reference Range Interpretation Comments

 

             Monocytes (test code = Monocytes) 6.4                              

      



[facility] CenterItmebqsELLKSIISDO3151-84-77 14:47:00





             Test Item    Value        Reference Range Interpretation Comments

 

             Eosinophils (test code = Eosinophils) 1.7                          

          



[facility] CenterDuqcjtaVUIEZRHUYD0189-94-93 14:47:00





             Test Item    Value        Reference Range Interpretation Comments

 

             Basophils (test code = Basophils) 0.7                              

      



[facility] [id number] 14:47:00





             Test Item    Value        Reference Range Interpretation Comments

 

             Result 2 (Urine Culture) See Result Comment                        

   



             (test code = Result 2                                        



             (Urine Culture))                                        



[facility] AND [id number] 14:47:00





             Test Item    Value        Reference Range Interpretation Comments

 

             UA Color (test code = UA Color) YELLOW                             

    



[facility] AND [id number] 14:47:00





             Test Item    Value        Reference Range Interpretation Comments

 

             UA Turbidity (test code = UA CLOUDY                                

 



             Turbidity)                                          



[facility] AND [id number] 14:47:00





             Test Item    Value        Reference Range Interpretation Comments

 

             UA Spec Grav (test code = UA Spec 1.017 1      1.001-1.035         

      



             Grav)                                               



[facility] AND [id number] 14:47:00





             Test Item    Value        Reference Range Interpretation Comments

 

             UA pH (test code = UA pH) 5.5 1        5.0-8.0                   



[facility] AND [id number] 14:47:00





             Test Item    Value        Reference Range Interpretation Comments

 

             UA Glucose (test code = UA Glucose) NEGATIVE                       

        



[facility] AND [id number] 14:47:00





             Test Item    Value        Reference Range Interpretation Comments

 

             UA Bili (test code = UA Bili) NEGATIVE                             

  



[facility] AND [id number] 14:47:00





             Test Item    Value        Reference Range Interpretation Comments

 

             UA Ketones (test code = UA Ketones) NEGATIVE                       

        



[facility] AND [id number] 14:47:00





             Test Item    Value        Reference Range Interpretation Comments

 

             UA Blood (test code = UA Blood) 1+                                 

    



[facility] AND [id number] 14:47:00





             Test Item    Value        Reference Range Interpretation Comments

 

             UA Protein (test code = UA Protein) 1+                             

        



[facility] AND [id number] 14:47:00





             Test Item    Value        Reference Range Interpretation Comments

 

             UA Nitrite (test code = UA Nitrite) POSITIVE                       

        



[facility] AND [id number] 14:47:00





             Test Item    Value        Reference Range Interpretation Comments

 

             UA Leuk Est (test code = UA Leuk Est) 2+                           

          



[facility] AND [id number] 14:47:00





             Test Item    Value        Reference Range Interpretation Comments

 

             UA WBC (test code = UA WBC) > OR = 60                              



[facility] AND [id number] 14:47:00





             Test Item    Value        Reference Range Interpretation Comments

 

             UA RBC (test code = UA RBC) 0-2                                    



[facility] [id number] 14:47:00





             Test Item    Value        Reference Range Interpretation Comments

 

             UA Sq Epi (test code = UA Sq Epi) NONE SEEN                        

      



[facility] [id number] 14:47:00





             Test Item    Value        Reference Range Interpretation Comments

 

             UA Bacteria (test code = UA Bacteria) MANY                         

          



[facility] [id number] 14:47:00





             Test Item    Value        Reference Range Interpretation Comments

 

             UA Hyal Cast (test code = UA Hyal NONE SEEN                        

      



             Cast)                                               



[facility] AND [id number] 14:47:00





             Test Item    Value        Reference Range Interpretation Comments

 

             UA Reflex (test code CULTURE INDICATED -                           



             = UA Reflex) RESULTS TO FOLLOW                           



[facility]2020-08-06 14:47:00





             Test Item    Value        Reference Range Interpretation Comments

 

             U Creat mg/dL (test code = U Creat 114                       

       



             mg/dL)                                              



[facility] [id number] 14:47:00





             Test Item    Value        Reference Range Interpretation Comments

 

             U Alb (test code = U Alb) 16.7                                   



[facility] [id number] 14:47:00





             Test Item    Value        Reference Range Interpretation Comments

 

             U Alb/Crea (test code = U Alb/Crea) 146                            

        



[facility] PROTEIN ELECTROPHORESIS-24HR [id number] 08:01:00





             Test Item    Value        Reference Range Interpretation Comments

 

             CREATININE, 24 HOUR 1.45 g/24 h  0.50-2.15                 



             URINE (test code =                                        



             26887295)                                           

 

             PROTEIN/CREATININE 292 mg/g creat < OR = 114   H            



             RATION (test code =                                        



             97427049)                                           

 

             PROTEIN, TOTAL 24 HR  mg/24 h  <150         H            TEST

 PERFORMED



             (test code = 39099308)                                        AT:[facility]

EST



                                                                 [facility]

IN



                                                                 [address] [place] [address], [place]



                                                                 [id number] FELIX MD

 

             ALBUMIN (test code = 35 %                                   



             21262463)                                           

 

             ALPHA-1-GLOBULINS (test 10 %                                   



             code = 23692063)                                        

 

             ALPHA-2-GLOBULINS (test 12 %                                   



             code = 19661823)                                        

 

             BETA GLOBULINS (test 25 %                                   



             code = 48770519)                                        

 

             GAMMA GLOBULINS (test 18 %                                   



             code = 31066843)                                        

 

             INTERPRETATION (test                                        Albumin

 and



             code = 53932703)                                        various aba

bulin



                                                                 fractions



                                                                 detected on



                                                                 proteinelectrop

ho



                                                                 resis. No



                                                                 abnormal protei

n



                                                                 bands



                                                                 (Bence-Jonespro

te



                                                                 inuria)



                                                                 detected.TEST



                                                                 PERFORMED



                                                                 AT:[facility]



                                                                 [facility]

IN



                                                                 [facility].RIDDHI ALVAREZ



                                                                 [id number] FELIX MD



NEUTROPHIL CYTOPLASMIC [id number] 05:19:00





             Test Item    Value        Reference Range Interpretation Comments

 

             ANCA SCREEN (test NEGATIVE     NEGATIVE                  ANCA Scree

n includes



             code = 39763522)                                        evaluation 

for p-ANCA,



                                                                 c-ANCA andatypi

tayla p-ANCA.



                                                                 A positive ANCA

 screen



                                                                 reflexes to tit

erand



                                                                 pattern(s), e.g

.,



                                                                 cytoplasmic pat

tern



                                                                 (c-ANCA),perinu

clear



                                                                 pattern (p-ANCA

), or



                                                                 atypical p-ANCA



                                                                 pattern.c-ANCA 

and p-ANCA



                                                                 are observed in

 vasculitis,



                                                                 whereasatypical

 p-ANCA is



                                                                 observed in IBD



                                                                 (Inflammatory



                                                                 BowelDisease). 

Atypical



                                                                 p-ANCA is detec

kolby in about



                                                                 55% to 80% ofpa

tients with



                                                                 ulcerative coli

tis but only



                                                                 5% to 25% ofpat

ients with



                                                                 Crohn's disease

.TEST



                                                                 PERFORMED AT:[facility]



                                                                 DIAGNOST[facility]



                                                                 [id number] [facility] [place]



                                                                 [id number] MARIEE MD,PHD

,RAMILA



COMPREHENSIVE METABOLIC [id number] 06:25:00





             Test Item    Value        Reference Range Interpretation Comments

 

             GLUCOSE (test code = 06D) 115 mg/dL           H            

 

             SODIUM (test code = 01A) 137 mmol/L   136-145                   

 

             POTASSIUM (test code = 01B) 3.3 mmol/L   3.6-5.1      L            

 

             CHLORIDE (test code = 04A) 97 mmol/L           L            

 

             CO2 (test code = 02A) 32 mmol/L    22-32                     

 

             ANION GAP (test code = ANG) 11.3 mmol/L                            

 

             BUN (test code = 05D) 36 mg/dL     7-18         H            

 

             CREATININE (test code = 03E) 1.4 mg/dL    0.4-1.1      H           

 

 

             BUN/CREA (test code = BCR) 25           12-20        H            

 

             CALCIUM (test code = 09D) 8.8 mg/dL    8.3-9.5                   

 

             BILI TOTAL (test code = 11A) 1.2 mg/dL    0.2-1.0      H           

 

 

             PROTEIN (test code = 07D) 6.5 g/dL     6.4-8.2                   

 

             ALBUMIN (test code = 08D) 2.9 g/dL     3.5-4.8      L            

 

             GLOBULIN (test code = GLB) 3.6 g/dL     1.5-3.8                   

 

             ALB/GLOB (test code = AGRR) 0.8          1.0-2.6      L            

 

             ALK PHOS (test code = 35A) 97 IU/L                          

 

             AST (test code = 30A) 15 IU/L      <=42                      

 

             ALT (test code = 31A) 35 IU/L      <=78                      



CBC (INCLUDES AUTOMATED DIFFERENTIAL)2019 06:06:00





             Test Item    Value        Reference Range Interpretation Comments

 

             WBC (test code = WBC) 6.9 10\S\3/uL 4.5-11.0                  

 

             RBC (test code = RBC) 3.56 10\S\6/uL 4.20-5.60    L            

 

             HGB (test code = HBG) 10.4 g/dL    12.0-15.5    L            

 

             HCT (test code = HCT) 32.1 %       35.0-44.0    L            

 

             MCV (test code = MCV) 90.2 fL      81.0-99.0                 

 

             MCH (test code = MCH) 29.2 pg      27.0-31.0                 

 

             MCHC (test code = MCHC) 32.4 g/dL    32.0-36.0                 

 

             RDW (test code = RDW) 15.0 %       11.5-14.5    H            

 

             PLT (test code = PLT) 158 10\S\3/uL 130-400                   

 

             MPV (test code = MPV) 10.7 fL      9.4-12.4                  

 

             NEUTROP # (test code = NE#) 4.4 10\S\3/uL 1.6-8.0                  

 

 

             LYMPH # (test code = LY#) 1.8 10\S\3/uL 1.1-3.5                   

 

             MONOCYTE # (test code = MO#) 0.5 10\S\3/uL 0.0-1.1                 

  

 

             EOSINOPH # (test code = EO#) 0.2 10\S\3/uL 0.0-0.7                 

  

 

             BASOPHIL # (test code = BA#) 0.0 10\S\3/uL 0.0-0.3                 

  

 

             IG # (test code = IG#) 0.03 10\S\3/uL 0.00-0.06                 

 

             NRBC # (test code = NRBC#) 0.00 10\S\3/uL 0.00-0.01                

 

 

             NEUTROPH % (test code = NE%) 64.0 %       35.0-73.0                

 

 

             LYMPH % (test code = LY%) 26.1 %       20.0-55.0                 

 

             MONO % (test code = MO%) 6.5 %        2.5-10.0                  

 

             EOSINOPH % (test code = EO%) 2.6 %        0.0-5.0                  

 

 

             BASOPHIL % (test code = BA%) 0.4 %        0.0-2.0                  

 

 

             IG % (test code = IG%) 0.4 %        0.0-0.8                   

 

             NRBC% (test code = NRBC%) 0.0 %        0.0-0.2                   

 

             MANDIFF (test code = MDIFF) NO           NO                        

 

             RBC MORPH (test code = RBCMOR)              NORMAL                 

   



URINE [id number] 09:53:00





             Test Item    Value        Reference Range Interpretation Comments

 

             Isolate 1 (test code = Proteus mirabilis              A            



             ISO1)                                               

 

             ampicillin (test code = am)  ug/mL                    S            

 

             ampicillin/sulbactam (test  ug/mL                    S            



             code = ams)                                         

 

             piperacillin/tazobactam  ug/mL                    S            



             (test code = tzp)                                        

 

             ceftazidime (test code =  ug/mL                    S            



             jeannine)                                                

 

             ceftriaxone1 (test code =  ug/mL                    S            



             ctr)                                                

 

             cefepime (test code = fep)  ug/mL                    S            

 

             aztreonam (test code = azm)  ug/mL                    S            

 

             ertapenem (test code = etp)  ug/mL                    S            

 

             meropenem (test code = mem)  ug/mL                    S            

 

             gentamicin (test code = gm)  ug/mL                    S            

 

             tobramycin (test code =  ug/mL                    S            



             tob)                                                

 

             levofloxacin (test code =  ug/mL                    S            



             lev)                                                

 

             trimethoprim/sulfamethoxazo  ug/mL                    S            



             le (test code = sxt)                                        



PARTHYROID HORMONE (INTACT)2019 08:24:00





             Test Item    Value        Reference Range Interpretation Comments

 

             PTH INTACT (test code 166.6 pg/mL  18.4-80.1    H            



             = A85)                                              

 

             Ref Range Change ***** Please note the                           



             (test code = REF change in reference                           



             RANGE)       range ***                              



VITAMIN D TOTAL 25 (OH)2019 07:15:00





             Test Item    Value        Reference Range Interpretation Comments

 

             VITAMIN D 25(OH) (test code = VD) 36.0 ng/mL   30.0-100.0          

      



SERUM PROTEIN [id number] 06:20:00





             Test Item    Value        Reference Range Interpretation Comments

 

             PROTEIN TOTAL (test 5.7 g/dL     6.1-8.1      L            TEST PER

FORMED AT:[facility]



             code = 30083193)                                        [facility]

[id number]



                                                                 [facility]

[place], [place]



                                                                 [id number] FELIX MD

 

             ALBUMIN (test code = 3.2 g/dL     3.8-4.8      L            



             25441835)                                           

 

             ALPHA-1-GLOBULINS 0.4 g/dL     0.2-0.3      H            



             (test code =                                        



             44525525)                                           

 

             ALPHA-2-GLOBULINS 0.8 g/dL     0.5-0.9                   



             (test code =                                        



             47844557)                                           

 

             BETA 1 GLOBULIN (test 0.5 g/dL     0.4-0.6                   



             code = 52491899)                                        

 

             BETA 2 GLOBULIN (test 0.2 g/dL     0.2-0.5                   



             code = 68099522)                                        

 

             GAMMA GLOBULINS (test 0.6 g/dL     0.8-1.7      L            



             code = 72809526)                                        

 

             INTERPRETATION (test                                        Pattern

 consistent with



             code = 39203459)                                        an acute ph

ase



                                                                 reactionConsist

ent with



                                                                 hypogammaglobul

inemia.



                                                                 Serum free ligh

tchains



                                                                 or urine immuno

fixation



                                                                 should be consi

dered



                                                                 ifplasma cell d

yscrasias



                                                                 are a possible



                                                                 clinicaldiagnos

is.TEST



                                                                 PERFORMED AT:[facility]

[facility]



                                                                 [facility]

[id number]



                                                                 [facility]

[place], [place]



                                                                 [address]LKDTSJONH FELIX MD



[id number] 06:13:00





             Test Item    Value        Reference Range Interpretation Comments

 

             MAGNESIUM (test code = 48A) 1.7 mg/dL    1.8-2.4      L            



COMPREHENSIVE METABOLIC [id number] 06:13:00





             Test Item    Value        Reference Range Interpretation Comments

 

             GLUCOSE (test code = 06D) 110 mg/dL           H            

 

             SODIUM (test code = 01A) 140 mmol/L   136-145                   

 

             POTASSIUM (test code = 01B) 3.1 mmol/L   3.6-5.1      L            

 

             CHLORIDE (test code = 04A) 96 mmol/L           L            

 

             CO2 (test code = 02A) 34 mmol/L    22-32        H            

 

             ANION GAP (test code = ANG) 13.1 mmol/L                            

 

             BUN (test code = 05D) 33 mg/dL     7-18         H            

 

             CREATININE (test code = 03E) 1.5 mg/dL    0.4-1.1      H           

 

 

             BUN/CREA (test code = BCR) 22           12-20        H            

 

             CALCIUM (test code = 09D) 9.1 mg/dL    8.3-9.5                   

 

             BILI TOTAL (test code = 11A) 1.4 mg/dL    0.2-1.0      H           

 

 

             PROTEIN (test code = 07D) 7.0 g/dL     6.4-8.2                   

 

             ALBUMIN (test code = 08D) 3.2 g/dL     3.5-4.8      L            

 

             GLOBULIN (test code = GLB) 3.8 g/dL     1.5-3.8                   

 

             ALB/GLOB (test code = AGRR) 0.8          1.0-2.6      L            

 

             ALK PHOS (test code = 35A) 111 IU/L                         

 

             AST (test code = 30A) 18 IU/L      <=42                      

 

             ALT (test code = 31A) 43 IU/L      <=78                      



PHOSPHORUS (P04)2019 06:13:00





             Test Item    Value        Reference Range Interpretation Comments

 

             PHOSPHORUS (test code = 43D) 4.0 mg/dL    2.7-4.6                  

 



CBC (INCLUDES AUTOMATED DIFFERENTIAL)2019 05:48:00





             Test Item    Value        Reference Range Interpretation Comments

 

             WBC (test code = WBC) 9.4 10\S\3/uL 4.5-11.0                  

 

             RBC (test code = RBC) 3.73 10\S\6/uL 4.20-5.60    L            

 

             HGB (test code = HBG) 10.8 g/dL    12.0-15.5    L            

 

             HCT (test code = HCT) 33.8 %       35.0-44.0    L            

 

             MCV (test code = MCV) 90.6 fL      81.0-99.0                 

 

             MCH (test code = MCH) 29.0 pg      27.0-31.0                 

 

             MCHC (test code = MCHC) 32.0 g/dL    32.0-36.0                 

 

             RDW (test code = RDW) 15.1 %       11.5-14.5    H            

 

             PLT (test code = PLT) 189 10\S\3/uL 130-400                   

 

             MPV (test code = MPV) 11.8 fL      9.4-12.4                  

 

             NEUTROP # (test code = NE#) 7.0 10\S\3/uL 1.6-8.0                  

 

 

             LYMPH # (test code = LY#) 1.6 10\S\3/uL 1.1-3.5                   

 

             MONOCYTE # (test code = MO#) 0.7 10\S\3/uL 0.0-1.1                 

  

 

             EOSINOPH # (test code = EO#) 0.1 10\S\3/uL 0.0-0.7                 

  

 

             BASOPHIL # (test code = BA#) 0.0 10\S\3/uL 0.0-0.3                 

  

 

             IG # (test code = IG#) 0.06 10\S\3/uL 0.00-0.06                 

 

             NRBC # (test code = NRBC#) 0.00 10\S\3/uL 0.00-0.01                

 

 

             NEUTROPH % (test code = NE%) 74.5 %       35.0-73.0    H           

 

 

             LYMPH % (test code = LY%) 16.5 %       20.0-55.0    L            

 

             MONO % (test code = MO%) 7.0 %        2.5-10.0                  

 

             EOSINOPH % (test code = EO%) 1.1 %        0.0-5.0                  

 

 

             BASOPHIL % (test code = BA%) 0.3 %        0.0-2.0                  

 

 

             IG % (test code = IG%) 0.6 %        0.0-0.8                   

 

             NRBC% (test code = NRBC%) 0.0 %        0.0-0.2                   

 

             MANDIFF (test code = MDIFF) NO           NO                        

 

             RBC MORPH (test code = RBCMOR)              NORMAL                 

   



COMPREHENSIVE METABOLIC [id number] 05:30:00





             Test Item    Value        Reference Range Interpretation Comments

 

             GLUCOSE (test code = 06D) 122 mg/dL           H            

 

             SODIUM (test code = 01A) 140 mmol/L   136-145                   

 

             POTASSIUM (test code = 01B) 3.8 mmol/L   3.6-5.1                   

 

             CHLORIDE (test code = 04A) 100 mmol/L                       

 

             CO2 (test code = 02A) 32 mmol/L    22-32                     

 

             ANION GAP (test code = ANG) 11.8 mmol/L                            

 

             BUN (test code = 05D) 29 mg/dL     7-18         H            

 

             CREATININE (test code = 03E) 1.5 mg/dL    0.4-1.1      H           

 

 

             BUN/CREA (test code = BCR) 20           12-20                     

 

             CALCIUM (test code = 09D) 9.0 mg/dL    8.3-9.5                   

 

             BILI TOTAL (test code = 11A) 1.3 mg/dL    0.2-1.0      H           

 

 

             PROTEIN (test code = 07D) 7.1 g/dL     6.4-8.2                   

 

             ALBUMIN (test code = 08D) 3.5 g/dL     3.5-4.8                   

 

             GLOBULIN (test code = GLB) 3.6 g/dL     1.5-3.8                   

 

             ALB/GLOB (test code = AGRR) 1.0          1.0-2.6                   

 

             ALK PHOS (test code = 35A) 119 IU/L                         

 

             AST (test code = 30A) 22 IU/L      <=42                      

 

             ALT (test code = 31A) 49 IU/L      <=78                      



CBC (INCLUDES AUTOMATED DIFFERENTIAL)2019 05:18:00





             Test Item    Value        Reference Range Interpretation Comments

 

             WBC (test code = WBC) 9.0 10\S\3/uL 4.5-11.0                  

 

             RBC (test code = RBC) 3.94 10\S\6/uL 4.20-5.60    L            

 

             HGB (test code = HBG) 11.4 g/dL    12.0-15.5    L            

 

             HCT (test code = HCT) 35.8 %       35.0-44.0                 

 

             MCV (test code = MCV) 90.9 fL      81.0-99.0                 

 

             MCH (test code = MCH) 28.9 pg      27.0-31.0                 

 

             MCHC (test code = MCHC) 31.8 g/dL    32.0-36.0    L            

 

             RDW (test code = RDW) 14.8 %       11.5-14.5    H            

 

             PLT (test code = PLT) 164 10\S\3/uL 130-400                   

 

             MPV (test code = MPV) 11.6 fL      9.4-12.4                  

 

             NEUTROP # (test code = NE#) 6.8 10\S\3/uL 1.6-8.0                  

 

 

             LYMPH # (test code = LY#) 1.4 10\S\3/uL 1.1-3.5                   

 

             MONOCYTE # (test code = MO#) 0.6 10\S\3/uL 0.0-1.1                 

  

 

             EOSINOPH # (test code = EO#) 0.1 10\S\3/uL 0.0-0.7                 

  

 

             BASOPHIL # (test code = BA#) 0.0 10\S\3/uL 0.0-0.3                 

  

 

             IG # (test code = IG#) 0.06 10\S\3/uL 0.00-0.06                 

 

             NRBC # (test code = NRBC#) 0.00 10\S\3/uL 0.00-0.01                

 

 

             NEUTROPH % (test code = NE%) 75.6 %       35.0-73.0    H           

 

 

             LYMPH % (test code = LY%) 15.9 %       20.0-55.0    L            

 

             MONO % (test code = MO%) 6.5 %        2.5-10.0                  

 

             EOSINOPH % (test code = EO%) 0.9 %        0.0-5.0                  

 

 

             BASOPHIL % (test code = BA%) 0.4 %        0.0-2.0                  

 

 

             IG % (test code = IG%) 0.7 %        0.0-0.8                   

 

             NRBC% (test code = NRBC%) 0.0 %        0.0-0.2                   

 

             MANDIFF (test code = MDIFF) NO           NO                        

 

             RBC MORPH (test code = RBCMOR)              NORMAL                 

   



URINALYSIS WITH [id number] 06:44:00





             Test Item    Value        Reference Range Interpretation Comments

 

             COLOR (test code = COLU) YELLOW       YELLOW                    

 

             CLARITY (test code = CLA) CLOUDY       CLEAR        A            

 

             GLUCOSE UR (test code = UA NEGATIVE     NEGATIVE                  



             GLUCOSE)                                            

 

             BILI UR (test code = BILE) NEGATIVE     NEGATIVE                  

 

             KETONES UR (test code = ACE) NEGATIVE     NEGATIVE                 

 

 

             SP GRAVITY (test code = SPGR) 1.014        1.005-1.030             

  

 

             PH UR (test code = PH) 6.0          4.5-8.0                   

 

             PROTEIN UR (test code = PU) TRACE        NEGATIVE     A            

 

             UROBIL UR (test code = UROQ) 0.2 EU/dL    0.2-1.0                  

 

 

             NITRITE UR (test code = NEGATIVE     NEGATIVE                  



             NITRITE)                                            

 

             BLOOD UR (test code = UA BLOOD) TRACE        NEGATIVE     A        

    

 

             LEUK ES UR (test code = LEUK) 2+           NEGATIVE     A          

  

 

             WBC UR (test code = UWBC) 12 /HPF      0-5          H            

 

             RBC UR (test code = URBC) 1 /HPF       0-2                       

 

             EPITH UR (test code = UEPC) FEW /LPF     FEW                       

 

             BACTERIA UR (test code = UBACT) MODERATE /HPF NONE         A       

     

 

             CAST UR (test code = CAST)  /LPF        NONE                      

 

             CRYSTAL UR (test code = CRYU)  / LPF       NONE                    

  

 

             MUCUS UR (test code = MUC)  / HPF       NONE                      

 

             AMORPH UR (test code = YASMEEN)  / HPF       NONE                     

 

 

             TRICH UR (test code = UTRICH)  /HPF        NONE                    

  

 

             YEAST UR (test code = UY)  /HPF        NONE                      

 

             SPERM UR (test code = USPERM)  /HPF        NONE                    

  



COMPREHENSIVE METABOLIC [id number] 05:24:00





             Test Item    Value        Reference Range Interpretation Comments

 

             GLUCOSE (test code = 06D) 105 mg/dL           H            

 

             SODIUM (test code = 01A) 138 mmol/L   136-145                   

 

             POTASSIUM (test code = 01B) 4.0 mmol/L   3.6-5.1                   

 

             CHLORIDE (test code = 04A) 104 mmol/L                       

 

             CO2 (test code = 02A) 25 mmol/L    22-32                     

 

             ANION GAP (test code = ANG) 13.0 mmol/L                            

 

             BUN (test code = 05D) 29 mg/dL     7-18         H            

 

             CREATININE (test code = 03E) 1.5 mg/dL    0.4-1.1      H           

 

 

             BUN/CREA (test code = BCR) 19           12-20                     

 

             CALCIUM (test code = 09D) 8.4 mg/dL    8.3-9.5                   

 

             BILI TOTAL (test code = 11A) 0.7 mg/dL    0.2-1.0                  

 

 

             PROTEIN (test code = 07D) 6.2 g/dL     6.4-8.2      L            

 

             ALBUMIN (test code = 08D) 3.1 g/dL     3.5-4.8      L            

 

             GLOBULIN (test code = GLB) 3.1 g/dL     1.5-3.8                   

 

             ALB/GLOB (test code = AGRR) 1.0          1.0-2.6                   

 

             ALK PHOS (test code = 35A) 104 IU/L                         

 

             AST (test code = 30A) 22 IU/L      <=42                      

 

             ALT (test code = 31A) 42 IU/L      <=78                      



CBC (INCLUDES AUTOMATED DIFFERENTIAL)2019 05:07:00





             Test Item    Value        Reference Range Interpretation Comments

 

             WBC (test code = WBC) 8.6 10\S\3/uL 4.5-11.0                  

 

             RBC (test code = RBC) 3.72 10\S\6/uL 4.20-5.60    L            

 

             HGB (test code = HBG) 10.8 g/dL    12.0-15.5    L            

 

             HCT (test code = HCT) 33.7 %       35.0-44.0    L            

 

             MCV (test code = MCV) 90.6 fL      81.0-99.0                 

 

             MCH (test code = MCH) 29.0 pg      27.0-31.0                 

 

             MCHC (test code = MCHC) 32.0 g/dL    32.0-36.0                 

 

             RDW (test code = RDW) 14.7 %       11.5-14.5    H            

 

             PLT (test code = PLT) 152 10\S\3/uL 130-400                   

 

             MPV (test code = MPV) 11.4 fL      9.4-12.4                  

 

             NEUTROP # (test code = NE#) 6.2 10\S\3/uL 1.6-8.0                  

 

 

             LYMPH # (test code = LY#) 1.6 10\S\3/uL 1.1-3.5                   

 

             MONOCYTE # (test code = MO#) 0.5 10\S\3/uL 0.0-1.1                 

  

 

             EOSINOPH # (test code = EO#) 0.2 10\S\3/uL 0.0-0.7                 

  

 

             BASOPHIL # (test code = BA#) 0.1 10\S\3/uL 0.0-0.3                 

  

 

             IG # (test code = IG#) 0.03 10\S\3/uL 0.00-0.06                 

 

             NRBC # (test code = NRBC#) 0.00 10\S\3/uL 0.00-0.01                

 

 

             NEUTROPH % (test code = NE%) 72.8 %       35.0-73.0                

 

 

             LYMPH % (test code = LY%) 18.5 %       20.0-55.0    L            

 

             MONO % (test code = MO%) 5.8 %        2.5-10.0                  

 

             EOSINOPH % (test code = EO%) 2.0 %        0.0-5.0                  

 

 

             BASOPHIL % (test code = BA%) 0.6 %        0.0-2.0                  

 

 

             IG % (test code = IG%) 0.3 %        0.0-0.8                   

 

             NRBC% (test code = NRBC%) 0.0 %        0.0-0.2                   

 

             MANDIFF (test code = MDIFF) NO           NO                        

 

             RBC MORPH (test code = RBCMOR)              NORMAL                 

   



U/S KIDNEY (RENAL)2019 23:20:42LOCATION: [id number]: 63-year-old female 
who presents with acute nontraumatic kidney injury.COMMENT:Sonographic imaging 
of this patient's retroperitoneum was performed.The right kidney measures 9.9 x 
5.9 x 6.4 cm with a 13 mm cortical thickness. Acyst is seen in the upper pole 
measuring 23 x 19 x 19 mm, and a second cyst isseen in the lower pole measuring 
18 x 16 x 16 mm.The left kidney measures 9.4 x 5.3 x 5.6 cm with a 11 mm 
cortical thickness. Nocysts or masses are seen in the left kidney.Cortical 
echotexture of the kidneys otherwise is unremarkable.There is no evidence of 
hydronephrosis of either kidney.In the urinary bladder only the left urine jet 
was observed on the color flowstudy. The bladder otherwise is 
unremarkable.IMPRESSION:Cystic changes are seen in the upper pole and lower pole
ofthis patient'sright kidney. No gross abnormalities are seen elsewhere in 
either kidney.The urinary bladder is unremarkable. Only the left urine jet was 
observed onthe color flow study.TROPONIN -[id number] 07:14:00





             Test Item    Value        Reference Range Interpretation Comments

 

             TROPONIN I (test code = A84) <0.015 ng/mL 0.000-0.045              

 



COMPREHENSIVE METABOLIC [id number] 05:28:00





             Test Item    Value        Reference Range Interpretation Comments

 

             GLUCOSE (test code = 06D) 110 mg/dL           H            

 

             SODIUM (test code = 01A) 138 mmol/L   136-145                   

 

             POTASSIUM (test code = 01B) 3.9 mmol/L   3.6-5.1                   

 

             CHLORIDE (test code = 04A) 106 mmol/L                       

 

             CO2 (test code = 02A) 24 mmol/L    22-32                     

 

             ANION GAP (test code = ANG) 11.9 mmol/L                            

 

             BUN (test code = 05D) 22 mg/dL     7-18         H            

 

             CREATININE (test code = 03E) 1.5 mg/dL    0.4-1.1      H           

 

 

             BUN/CREA (test code = BCR) 15           12-20                     

 

             CALCIUM (test code = 09D) 8.2 mg/dL    8.3-9.5      L            

 

             BILI TOTAL (test code = 11A) 0.6 mg/dL    0.2-1.0                  

 

 

             PROTEIN (test code = 07D) 6.0 g/dL     6.4-8.2      L            

 

             ALBUMIN (test code = 08D) 2.9 g/dL     3.5-4.8      L            

 

             GLOBULIN (test code = GLB) 3.1 g/dL     1.5-3.8                   

 

             ALB/GLOB (test code = AGRR) 0.9          1.0-2.6      L            

 

             ALK PHOS (test code = 35A) 96 IU/L                          

 

             AST (test code = 30A) 19 IU/L      <=42                      

 

             ALT (test code = 31A) 35 IU/L      <=78                      



CBC (INCLUDES AUTOMATED DIFFERENTIAL)2019 05:14:00





             Test Item    Value        Reference Range Interpretation Comments

 

             WBC (test code = WBC) 7.0 10\S\3/uL 4.5-11.0                  

 

             RBC (test code = RBC) 3.64 10\S\6/uL 4.20-5.60    L            

 

             HGB (test code = HBG) 10.6 g/dL    12.0-15.5    L            

 

             HCT (test code = HCT) 33.5 %       35.0-44.0    L            

 

             MCV (test code = MCV) 92.0 fL      81.0-99.0                 

 

             MCH (test code = MCH) 29.1 pg      27.0-31.0                 

 

             MCHC (test code = MCHC) 31.6 g/dL    32.0-36.0    L            

 

             RDW (test code = RDW) 14.9 %       11.5-14.5    H            

 

             PLT (test code = PLT) 145 10\S\3/uL 130-400                   

 

             MPV (test code = MPV) 11.4 fL      9.4-12.4                  

 

             NEUTROP # (test code = NE#) 4.2 10\S\3/uL 1.6-8.0                  

 

 

             LYMPH # (test code = LY#) 2.1 10\S\3/uL 1.1-3.5                   

 

             MONOCYTE # (test code = MO#) 0.5 10\S\3/uL 0.0-1.1                 

  

 

             EOSINOPH # (test code = EO#) 0.1 10\S\3/uL 0.0-0.7                 

  

 

             BASOPHIL # (test code = BA#) 0.0 10\S\3/uL 0.0-0.3                 

  

 

             IG # (test code = IG#) 0.04 10\S\3/uL 0.00-0.06                 

 

             NRBC # (test code = NRBC#) 0.00 10\S\3/uL 0.00-0.01                

 

 

             NEUTROPH % (test code = NE%) 59.7 %       35.0-73.0                

 

 

             LYMPH % (test code = LY%) 30.1 %       20.0-55.0                 

 

             MONO % (test code = MO%) 7.0 %        2.5-10.0                  

 

             EOSINOPH % (test code = EO%) 2.0 %        0.0-5.0                  

 

 

             BASOPHIL % (test code = BA%) 0.6 %        0.0-2.0                  

 

 

             IG % (test code = IG%) 0.6 %        0.0-0.8                   

 

             NRBC% (test code = NRBC%) 0.0 %        0.0-0.2                   

 

             MANDIFF (test code = MDIFF) NO           NO                        

 

             RBC MORPH (test code = RBCMOR)              NORMAL                 

   



COMPREHENSIVE METABOLIC PAN2019-08-15 04:58:00





             Test Item    Value        Reference Range Interpretation Comments

 

             GLUCOSE (test code = 06D) 112 mg/dL           H            

 

             SODIUM (test code = 01A) 143 mmol/L   136-145                   

 

             POTASSIUM (test code = 01B) 4.4 mmol/L   3.6-5.1                   

 

             CHLORIDE (test code = 04A) 108 mmol/L          H            

 

             CO2 (test code = 02A) 27 mmol/L    22-32                     

 

             ANION GAP (test code = ANG) 12.4 mmol/L                            

 

             BUN (test code = 05D) 19 mg/dL     7-18         H            

 

             CREATININE (test code = 03E) 1.4 mg/dL    0.4-1.1      H           

 

 

             BUN/CREA (test code = BCR) 14           12-20                     

 

             CALCIUM (test code = 09D) 8.5 mg/dL    8.3-9.5                   

 

             BILI TOTAL (test code = 11A) 0.9 mg/dL    0.2-1.0                  

 

 

             PROTEIN (test code = 07D) 6.2 g/dL     6.4-8.2      L            

 

             ALBUMIN (test code = 08D) 2.9 g/dL     3.5-4.8      L            

 

             GLOBULIN (test code = GLB) 3.3 g/dL     1.5-3.8                   

 

             ALB/GLOB (test code = AGRR) 0.9          1.0-2.6      L            

 

             ALK PHOS (test code = 35A) 95 IU/L                          

 

             AST (test code = 30A) 19 IU/L      <=42                      

 

             ALT (test code = 31A) 40 IU/L      <=78                      



CBC (INCLUDES AUTOMATED DIFFERENTIAL)2019-08-15 04:51:00





             Test Item    Value        Reference Range Interpretation Comments

 

             WBC (test code = WBC) 8.2 10\S\3/uL 4.5-11.0                  

 

             RBC (test code = RBC) 3.48 10\S\6/uL 4.20-5.60    L            

 

             HGB (test code = HBG) 10.3 g/dL    12.0-15.5    L            

 

             HCT (test code = HCT) 32.4 %       35.0-44.0    L            

 

             MCV (test code = MCV) 93.1 fL      81.0-99.0                 

 

             MCH (test code = MCH) 29.6 pg      27.0-31.0                 

 

             MCHC (test code = MCHC) 31.8 g/dL    32.0-36.0    L            

 

             RDW (test code = RDW) 15.5 %       11.5-14.5    H            

 

             PLT (test code = PLT) 163 10\S\3/uL 130-400                   

 

             MPV (test code = MPV) 11.8 fL      9.4-12.4                  

 

             NEUTROP # (test code = NE#) 5.3 10\S\3/uL 1.6-8.0                  

 

 

             LYMPH # (test code = LY#) 2.0 10\S\3/uL 1.1-3.5                   

 

             MONOCYTE # (test code = MO#) 0.6 10\S\3/uL 0.0-1.1                 

  

 

             EOSINOPH # (test code = EO#) 0.2 10\S\3/uL 0.0-0.7                 

  

 

             BASOPHIL # (test code = BA#) 0.0 10\S\3/uL 0.0-0.3                 

  

 

             IG # (test code = IG#) 0.03 10\S\3/uL 0.00-0.06                 

 

             NRBC # (test code = NRBC#) 0.00 10\S\3/uL 0.00-0.01                

 

 

             NEUTROPH % (test code = NE%) 65.5 %       35.0-73.0                

 

 

             LYMPH % (test code = LY%) 24.2 %       20.0-55.0                 

 

             MONO % (test code = MO%) 7.2 %        2.5-10.0                  

 

             EOSINOPH % (test code = EO%) 2.2 %        0.0-5.0                  

 

 

             BASOPHIL % (test code = BA%) 0.5 %        0.0-2.0                  

 

 

             IG % (test code = IG%) 0.4 %        0.0-0.8                   

 

             NRBC% (test code = NRBC%) 0.0 %        0.0-0.2                   

 

             MANDIFF (test code = MDIFF) NO           NO                        



CARDIAC [id number] 23:10:00





             Test Item    Value        Reference Range Interpretation Comments

 

             TROPONIN I (test code = A84) <0.015 ng/mL 0.000-0.045              

 



U/S VENOUS DOPPLER IVON LOW [id number] 22:23:14LOCATION: [id number]: 
63-year-old female who presents with bilateral leg swelling.COMMENT: Sonographic
imaging of the venous anatomy in both of this patient's legs wasobtained 
utilizing grayscale, color-flow, and Doppler waveform imagingmodalities.The 
common femoral veins, superficial femoral veins, popliteal veins, 
posteriortibial veins, anterior tibial veins, and peroneal veins in both legs 
wereincluded in the study.The anatomy exhibits normal compressibility on 
grayscale study. No suspiciousfilling defects are seen on the color flow study. 
Appropriate waveform responseas are noted on the Dopplerstudy during 
augmentation maneuvers and duringquiet respiration. IMPRESSION:There is no 
sonographic evidence of venous thrombosis in either of thispatient's legs.
CARDIAC [id number] 16:50:00





             Test Item    Value        Reference Range Interpretation Comments

 

             TROPONIN I (test code = A84) <0.015 ng/mL 0.000-0.045              

 



BRAIN NATRIURETIC [id number] 14:39:00





             Test Item    Value        Reference Range Interpretation Comments

 

             proBNP (test code = PBNP) 1337 pg/mL   0-125        H            



THYROID PANEL/SCREEN (TSH)2019 12:05:00





             Test Item    Value        Reference Range Interpretation Comments

 

             TSH (test code = A57) 0.989 uIU/mL 0.358-3.740               



[id number] 12:02:00





             Test Item    Value        Reference Range Interpretation Comments

 

             CPK (test code = 32A) 98 IU/L                          



AMYLASE AND [id number] 12:02:00





             Test Item    Value        Reference Range Interpretation Comments

 

             AMYLASE (test code = 10A) 19 U/L              L            

 

             LIPASE (test code = 60A) 67 IU/L             L            



COMPREHENSIVE METABOLIC [id number] 12:02:00





             Test Item    Value        Reference Range Interpretation Comments

 

             GLUCOSE (test code = 06D) 109 mg/dL           H            

 

             SODIUM (test code = 01A) 142 mmol/L   136-145                   

 

             POTASSIUM (test code = 01B) 4.2 mmol/L   3.6-5.1                   

 

             CHLORIDE (test code = 04A) 109 mmol/L          H            

 

             CO2 (test code = 02A) 26 mmol/L    22-32                     

 

             ANION GAP (test code = ANG) 11.2 mmol/L                            

 

             BUN (test code = 05D) 16 mg/dL     7-18                      

 

             CREATININE (test code = 03E) 1.3 mg/dL    0.4-1.1      H           

 

 

             BUN/CREA (test code = BCR) 13           12-20                     

 

             CALCIUM (test code = 09D) 8.7 mg/dL    8.3-9.5                   

 

             BILI TOTAL (test code = 11A) 1.3 mg/dL    0.2-1.0      H           

 

 

             PROTEIN (test code = 07D) 6.5 g/dL     6.4-8.2                   

 

             ALBUMIN (test code = 08D) 3.2 g/dL     3.5-4.8      L            

 

             GLOBULIN (test code = GLB) 3.3 g/dL     1.5-3.8                   

 

             ALB/GLOB (test code = AGRR) 1.0          1.0-2.6                   

 

             ALK PHOS (test code = 35A) 101 IU/L                         

 

             AST (test code = 30A) 21 IU/L      <=42                      

 

             ALT (test code = 31A) 41 IU/L      <=78                      



TROPONIN -[id number] 11:57:00





             Test Item    Value        Reference Range Interpretation Comments

 

             TROPONIN I (test code = A84) <0.015 ng/mL 0.000-0.045              

 



XR CHEST 1 VIEW [id number] 11:55:22EXAM: XR CHEST 1 VIEW 
PORTABLE.LOCATION: D4.HISTORY: [id number]: Chest pain.COMPARISON: Radiograph dated
2019.TECHNIQUE: Single AP view of the chest was obtained. FINDINGS:The 
heart is enlarged in size. Small left pleural effusion and left basilaropacities
are present. There is elevation of the right hemidiaphragm. No acuteosseous 
abnormality is identified.IMPRESSION:Small left pleural effusion with left 
basilar opacities, which may representatelectasis or infiltrates.Cardiomegaly.
PRO TIME AND [id number] 11:43:00





             Test Item    Value        Reference Range Interpretation Comments

 

             PT (test code = 13.4 s       9.8-13.6                  



             TT)                                                 

 

             INR (test code = 1.2                                    



             INR)                                                

 

             INRH (test code =  **** SUGGESTED THERAPEUTIC                      

     



             INRH)        RANGE FOR INR: **** 2.5 -                           



                          3.5 ** For Patients with                           



                          Prosthetic Valves or                           



                          Patients with recurrent                           



                          Thromboembolic Events **                           



                          2.0 - 3.0 ** For Most Other                           



                          Applications **                           

 

             PTT (test code = 30.4 s       20.2-38.0                 



             PTT)                                                

 

             PTTH (test code = **** To monitor the                           



             PTTH)        effectiveness of heparin,                           



                          we offer the Anti-Xa                           



                          (Heparin Assay). It can be                           



                          used for either                           



                          unfractionated or LMW                           



                          Heparin. Order Code is                           



                          ANTI-XA ****                           



CBC (INCLUDES AUTOMATED DIFFERENTIAL)2019 11:36:00





             Test Item    Value        Reference Range Interpretation Comments

 

             WBC (test code = WBC) 7.6 10\S\3/uL 4.5-11.0                  

 

             RBC (test code = RBC) 3.53 10\S\6/uL 4.20-5.60    L            

 

             HGB (test code = HBG) 10.3 g/dL    12.0-15.5    L            

 

             HCT (test code = HCT) 33.5 %       35.0-44.0    L            

 

             MCV (test code = MCV) 94.9 fL      81.0-99.0                 

 

             MCH (test code = MCH) 29.2 pg      27.0-31.0                 

 

             MCHC (test code = MCHC) 30.7 g/dL    32.0-36.0    L            

 

             RDW (test code = RDW) 15.4 %       11.5-14.5    H            

 

             PLT (test code = PLT) 164 10\S\3/uL 130-400                   

 

             MPV (test code = MPV) 11.7 fL      9.4-12.4                  

 

             NEUTROP # (test code = NE#) 5.6 10\S\3/uL 1.6-8.0                  

 

 

             LYMPH # (test code = LY#) 1.4 10\S\3/uL 1.1-3.5                   

 

             MONOCYTE # (test code = MO#) 0.4 10\S\3/uL 0.0-1.1                 

  

 

             EOSINOPH # (test code = EO#) 0.1 10\S\3/uL 0.0-0.7                 

  

 

             BASOPHIL # (test code = BA#) 0.0 10\S\3/uL 0.0-0.3                 

  

 

             IG # (test code = IG#) 0.04 10\S\3/uL 0.00-0.06                 

 

             NRBC # (test code = NRBC#) 0.00 10\S\3/uL 0.00-0.01                

 

 

             NEUTROPH % (test code = NE%) 73.9 %       35.0-73.0    H           

 

 

             LYMPH % (test code = LY%) 18.0 %       20.0-55.0    L            

 

             MONO % (test code = MO%) 5.7 %        2.5-10.0                  

 

             EOSINOPH % (test code = EO%) 1.6 %        0.0-5.0                  

 

 

             BASOPHIL % (test code = BA%) 0.3 %        0.0-2.0                  

 

 

             IG % (test code = IG%) 0.5 %        0.0-0.8                   

 

             NRBC% (test code = NRBC%) 0.0 %        0.0-0.2                   

 

             MANDIFF (test code = MDIFF) NO           NO                        

 

             RBC MORPH (test code = RBCMOR)              NORMAL                 

   



CBC WITH [id number] 15:29:00





             Test Item    Value        Reference Range Interpretation Comments

 

             WBC (test code = WBC) 9.7 10\S\3/uL 4.5-11.0                  

 

             RBC (test code = RBC) 3.73 10\S\6/uL 4.20-5.60    L            

 

             HGB (test code = HBG) 11.0 g/dL    12.0-15.5    L            

 

             HCT (test code = HCT) 34.1 %       35.0-44.0    L            

 

             MCV (test code = MCV) 91.4 fL      81.0-99.0                 

 

             MCH (test code = MCH) 29.5 pg      27.0-31.0                 

 

             MCHC (test code = MCHC) 32.3 g/dL    32.0-36.0                 

 

             RDW (test code = RDW) 14.2 %       11.5-14.5                 

 

             PLT (test code = PLT) 116 10\S\3/uL 130-400      L            

 

             MPV (test code = MPV) 11.8 fL      9.4-12.4                  

 

             NEUTROP # (test code = NE#) 7.9 10\S\3/uL 1.6-8.0                  

 

 

             LYMPH # (test code = LY#) 1.0 10\S\3/uL 1.1-3.5      L            

 

             MONOCYTE # (test code = 0.6 10\S\3/uL 0.0-1.1                   



             MO#)                                                

 

             EOSINOPH # (test code = 0.1 10\S\3/uL 0.0-0.7                   



             EO#)                                                

 

             BASOPHIL # (test code = 0.0 10\S\3/uL 0.0-0.3                   



             BA#)                                                

 

             IG # (test code = IG#) 0.04 10\S\3/uL 0.00-0.06                 

 

             NRBC # (test code = NRBC#) 0.00 10\S\3/uL 0.00-0.01                

 

 

             NEUTROPH % (test code = 81.6 %       35.0-73.0    H            



             NE%)                                                

 

             LYMPH % (test code = LY%) 10.3 %       20.0-55.0    L            

 

             MONO % (test code = MO%) 6.4 %        2.5-10.0                  

 

             EOSINOPH % (test code = 1.0 %        0.0-5.0                   



             EO%)                                                

 

             BASOPHIL % (test code = 0.3 %        0.0-2.0                   



             BA%)                                                

 

             IG % (test code = IG%) 0.4 %        0.0-0.8                   

 

             NRBC% (test code = NRBC%) 0.0 %        0.0-0.2                   

 

             PLT EST (test code = ADEQUATE     ADEQUATE                  



             PLTEST)                                             

 

             PLT MORPH (test code = NORMAL (1.5-3 um) NORMAL                    



             PLTMOR)                                             



BASIC METABOLIC [id number] 15:26:00





             Test Item    Value        Reference Range Interpretation Comments

 

             GLUCOSE (test code = 06D) 118 mg/dL           H            

 

             SODIUM (test code = 01A) 136 mmol/L   136-145                   

 

             POTASSIUM (test code = 01B) 4.2 mmol/L   3.6-5.1                   

 

             CHLORIDE (test code = 04A) 106 mmol/L                       

 

             CO2 (test code = 02A) 24 mmol/L    22-32                     

 

             ANION GAP (test code = ANG) 10.2 mmol/L                            

 

             BUN (test code = 05D) 38 mg/dL     7-18         H            

 

             CREATININE (test code = 03E) 1.6 mg/dL    0.4-1.1      H           

 

 

             BUN/CREA (test code = BCR) 23           12-20        H            

 

             CALCIUM (test code = 09D) 9.2 mg/dL    8.3-9.5                   



CARDIAC [id number] 06:04:00





             Test Item    Value        Reference Range Interpretation Comments

 

             TROPONIN I (test code = A84) <0.015 ng/mL 0.000-0.045              

 



BASIC METABOLIC [id number] 05:52:00





             Test Item    Value        Reference Range Interpretation Comments

 

             GLUCOSE (test code = 06D) 171 mg/dL           H            

 

             SODIUM (test code = 01A) 138 mmol/L   136-145                   

 

             POTASSIUM (test code = 01B) 4.0 mmol/L   3.6-5.1                   

 

             CHLORIDE (test code = 04A) 107 mmol/L                       

 

             CO2 (test code = 02A) 23 mmol/L    22-32                     

 

             ANION GAP (test code = ANG) 12.0 mmol/L                            

 

             BUN (test code = 05D) 19 mg/dL     7-18         H            

 

             CREATININE (test code = 03E) 1.2 mg/dL    0.4-1.1      H           

 

 

             BUN/CREA (test code = BCR) 15           12-20                     

 

             CALCIUM (test code = 09D) 8.5 mg/dL    8.3-9.5                   



CBC (INCLUDES AUTOMATED DIFFERENTIAL)2019 05:30:00





             Test Item    Value        Reference Range Interpretation Comments

 

             WBC (test code = WBC) 14.0 10\S\3/uL 4.5-11.0     H            

 

             RBC (test code = RBC) 3.64 10\S\6/uL 4.20-5.60    L            

 

             HGB (test code = HBG) 10.8 g/dL    12.0-15.5    L            

 

             HCT (test code = HCT) 33.2 %       35.0-44.0    L            

 

             MCV (test code = MCV) 91.2 fL      81.0-99.0                 

 

             MCH (test code = MCH) 29.7 pg      27.0-31.0                 

 

             MCHC (test code = MCHC) 32.5 g/dL    32.0-36.0                 

 

             RDW (test code = RDW) 14.0 %       11.5-14.5                 

 

             PLT (test code = PLT) 143 10\S\3/uL 130-400                   

 

             MPV (test code = MPV) 11.4 fL      9.4-12.4                  

 

             NEUTROP # (test code = NE#) 12.1 10\S\3/uL 1.6-8.0      H          

  

 

             LYMPH # (test code = LY#) 0.9 10\S\3/uL 1.1-3.5      L            

 

             MONOCYTE # (test code = MO#) 0.9 10\S\3/uL 0.0-1.1                 

  

 

             EOSINOPH # (test code = EO#) 0.0 10\S\3/uL 0.0-0.7                 

  

 

             BASOPHIL # (test code = BA#) 0.0 10\S\3/uL 0.0-0.3                 

  

 

             IG # (test code = IG#) 0.10 10\S\3/uL 0.00-0.06    H            

 

             NRBC # (test code = NRBC#) 0.00 10\S\3/uL 0.00-0.01                

 

 

             NEUTROPH % (test code = NE%) 86.2 %       35.0-73.0    H           

 

 

             LYMPH % (test code = LY%) 6.3 %        20.0-55.0    L            

 

             MONO % (test code = MO%) 6.6 %        2.5-10.0                  

 

             EOSINOPH % (test code = EO%) 0.0 %        0.0-5.0                  

 

 

             BASOPHIL % (test code = BA%) 0.2 %        0.0-2.0                  

 

 

             IG % (test code = IG%) 0.7 %        0.0-0.8                   

 

             NRBC% (test code = NRBC%) 0.0 %        0.0-0.2                   

 

             MANDIFF (test code = MDIFF) NO           NO                        

 

             RBC MORPH (test code = RBCMOR)              NORMAL                 

   



CARDIAC [id number] 23:08:00





             Test Item    Value        Reference Range Interpretation Comments

 

             TROPONIN I (test code = A84) <0.015 ng/mL 0.000-0.045              

 



CT DISSECTION [id number] 19:26:35Exam: CT thorax PE protocol.Location: H
12History: [id number]: Chest painTechnique: Enhanced spiral slices were taken from
the apices of the lungs,through the upper abdomen utilizing a pulmonary embolism
protocol. Multiplanarreformations were performed. 100cc of Omnipaque were used. 
One or more of thefollowing radiation dose reduction techniques was used: 
automated exposurecontrol, adjustment of mA and/or KV according to patient size,
and/orutilization of iterative reconstruction technique.Findings:Nofilling 
defects are seen in the main pulmonary arteries. The pulmonaryvasculature is 
normal. No pulmonary venous congestion or arterial hypertensionis seen.The lungs
are clear. No infiltration or effusion is seen. No mass or nodule isseen.The 
mediastinum and the pulmonary kandis are normal. Calcified granulomas arenoted. No
lymphadenopathy is present. The heart size is normal.No pericardialeffusion is s
een.The visualized upper abdominal organs are unremarkable.Impression:1. 
Negative for pulmonary embolism.2. No acute disease.THYROID PANEL/SCREEN (TSH)
2019 18:29:00





             Test Item    Value        Reference Range Interpretation Comments

 

             TSH (test code = A57) 0.706 uIU/mL 0.358-3.740               



LIVER [id number] 18:28:00





             Test Item    Value        Reference Range Interpretation Comments

 

             BILI TOTAL (test code = 11A) 0.9 mg/dL    0.2-1.0                  

 

 

             BILI DIRCT (test code = 12A) 0.2 mg/dL    0.0-0.2                  

 

 

             BILI INDIR (test code = BILII) 0.7 mg/dL    <=0.8                  

   

 

             PROTEIN (test code = 07D) 7.7 g/dL     6.4-8.2                   

 

             ALBUMIN (test code = 08D) 3.8 g/dL     3.5-4.8                   

 

             GLOBULIN (test code = GLB) 3.9 g/dL     1.5-3.8      H            

 

             ALB/GLOB (test code = AGRR) 1.0          1.0-2.6                   

 

             ALK PHOS (test code = 35A) 106 IU/L                         

 

             AST (test code = 30A) 25 IU/L      <=42                      

 

             ALT (test code = 31A) 21 IU/L      <=78                      



BRAIN NATRIURETIC [id number] 18:19:00





             Test Item    Value        Reference Range Interpretation Comments

 

             proBNP (test code = PBNP) 518 pg/mL    0-125        H            



[id number] 18:15:00





             Test Item    Value        Reference Range Interpretation Comments

 

             MAGNESIUM (test code = 48A) 1.9 mg/dL    1.8-2.4                   



[id number] 17:40:00





             Test Item    Value        Reference Range Interpretation Comments

 

             D-DIMER (test code = <200 ng/mL D-DU 0-234                     



             DDI)                                                

 

             D-DIMER COMMENT (test *Level to rule out                           



             code = DDCOM) DVT or PE: <235 ng/mL                           



                          D-DU*                                  



CARDIAC [id number] 17:31:00





             Test Item    Value        Reference Range Interpretation Comments

 

             TROPONIN I (test code = A84) <0.015 ng/mL 0.000-0.045              

 



BASIC METABOLIC [id number] 17:27:00





             Test Item    Value        Reference Range Interpretation Comments

 

             GLUCOSE (test code = 06D) 114 mg/dL           H            

 

             SODIUM (test code = 01A) 142 mmol/L   136-145                   

 

             POTASSIUM (test code = 01B) 4.0 mmol/L   3.6-5.1                   

 

             CHLORIDE (test code = 04A) 111 mmol/L          H            

 

             CO2 (test code = 02A) 26 mmol/L    22-32                     

 

             ANION GAP (test code = ANG) 9.0 mmol/L                             

 

             BUN (test code = 05D) 19 mg/dL     7-18         H            

 

             CREATININE (test code = 03E) 1.2 mg/dL    0.4-1.1      H           

 

 

             BUN/CREA (test code = BCR) 15           12-20                     

 

             CALCIUM (test code = 09D) 9.2 mg/dL    8.3-9.5                   



CBC (INCLUDES AUTOMATED DIFFERENTIAL)2019 17:26:00





             Test Item    Value        Reference Range Interpretation Comments

 

             WBC (test code = WBC) 12.6 10\S\3/uL 4.5-11.0     H            

 

             RBC (test code = RBC) 4.04 10\S\6/uL 4.20-5.60    L            

 

             HGB (test code = HBG) 11.9 g/dL    12.0-15.5    L            

 

             HCT (test code = HCT) 37.6 %       35.0-44.0                 

 

             MCV (test code = MCV) 93.1 fL      81.0-99.0                 

 

             MCH (test code = MCH) 29.5 pg      27.0-31.0                 

 

             MCHC (test code = MCHC) 31.6 g/dL    32.0-36.0    L            

 

             RDW (test code = RDW) 13.8 %       11.5-14.5                 

 

             PLT (test code = PLT) 152 10\S\3/uL 130-400                   

 

             MPV (test code = MPV) 11.3 fL      9.4-12.4                  

 

             NEUTROP # (test code = NE#) 10.7 10\S\3/uL 1.6-8.0      H          

  

 

             LYMPH # (test code = LY#) 1.1 10\S\3/uL 1.1-3.5                   

 

             MONOCYTE # (test code = MO#) 0.6 10\S\3/uL 0.0-1.1                 

  

 

             EOSINOPH # (test code = EO#) 0.1 10\S\3/uL 0.0-0.7                 

  

 

             BASOPHIL # (test code = BA#) 0.0 10\S\3/uL 0.0-0.3                 

  

 

             IG # (test code = IG#) 0.06 10\S\3/uL 0.00-0.06                 

 

             NRBC # (test code = NRBC#) 0.00 10\S\3/uL 0.00-0.01                

 

 

             NEUTROPH % (test code = NE%) 85.2 %       35.0-73.0    H           

 

 

             LYMPH % (test code = LY%) 8.4 %        20.0-55.0    L            

 

             MONO % (test code = MO%) 5.0 %        2.5-10.0                  

 

             EOSINOPH % (test code = EO%) 0.6 %        0.0-5.0                  

 

 

             BASOPHIL % (test code = BA%) 0.3 %        0.0-2.0                  

 

 

             IG % (test code = IG%) 0.5 %        0.0-0.8                   

 

             NRBC% (test code = NRBC%) 0.0 %        0.0-0.2                   

 

             MANDIFF (test code = MDIFF) NO           NO                        



PTT (PARTIAL THROMBOPLASTIN TIME)2019 17:26:00





             Test Item    Value        Reference Range Interpretation Comments

 

             PTT (test code = 31.9 s       20.2-38.0                 



             PTT)                                                

 

             PTTH (test code = **** To monitor the                           



             PTTH)        effectiveness of heparin,                           



                          we offer the Anti-Xa                           



                          (Heparin Assay). It can be                           



                          used for either                           



                          unfractionated or LMW                           



                          Heparin. Order Code is                           



                          ANTI-XA ****                           



PROTHROMBIN [id number] 17:26:00





             Test Item    Value        Reference Range Interpretation Comments

 

             PT (test code = 12.0 s       9.8-13.6                  



             TT)                                                 

 

             INR (test code = 1.1                                    



             INR)                                                

 

             INRH (test code =  **** SUGGESTED THERAPEUTIC                      

     



             INRH)        RANGE FOR INR: **** 2.5 -                           



                          3.5 ** For Patients with                           



                          Prosthetic Valves or                           



                          Patients with recurrent                           



                          Thromboembolic Events **                           



                          2.0 - 3.0 ** For Most Other                           



                          Applications **                           



XR CHEST 1 VIEW [id number] 17:04:28Portable AP chest, 1 viewLocation 
Code: [id number] HISTORY: Chest painCOMPARISON: NoneCOMMENT: Mild atelectasis is
present within the lung bases. The costophrenic angles aresharp. The 
cardiomediastinalsilhouette is unremarkable. The bones are intact.IMPRESSION: 
Mild bibasilar atelectasis. Otherwise, no acute abnormalityCHEM [id number] 
16:51:00





             Test Item    Value        Reference Range Interpretation Comments

 

             Creatinine Lvl (test code = Creatinine 1.15         0.50-1.40      

           



             Lvl)                                                



[facility]2017-01-24 16:51:00





             Test Item    Value        Reference Range Interpretation Comments

 

             BUN (test code = BUN) 16           7-22                      



[facility]2017-01-24 16:51:00





             Test Item    Value        Reference Range Interpretation Comments

 

             Chloride Lvl (test code = Chloride Lvl) 109                  

            



[id number]51[facility]





             Test Item    Value        Reference Range Interpretation Comments

 

             Potassium Lvl (test code = Potassium 4.3          3.5-5.1          

         



             Lvl)                                                



[facility]2017-01-24 16:51:00





             Test Item    Value        Reference Range Interpretation Comments

 

             Sodium Lvl (test code = Sodium Lvl) 144          135-145           

        



[facility]2017-01-24 16:51:00





             Test Item    Value        Reference Range Interpretation Comments

 

             CO2 (test code = CO2)                      



[facility]2017-01-24 16:51:00





             Test Item    Value        Reference Range Interpretation Comments

 

             Calcium Lvl (test code = Calcium Lvl) 8.4          8.5-10.5        

          



[facility]2017-01-24 16:51:00





             Test Item    Value        Reference Range Interpretation Comments

 

             AGAP (test code = AGAP) 11.3         10.0-20.0                 



[facility]2017-01-24 16:51:00





             Test Item    Value        Reference Range Interpretation Comments

 

             Glucose Lvl (test code = Glucose Lvl) 109          70-99           

          



[facility]2017-01-24 16:51:00





             Test Item    Value        Reference Range Interpretation Comments

 

             eGFR (test code = eGFR) 52                                     



[facility]2017-01-24 16:51:00





             Test Item    Value        Reference Range Interpretation Comments

 

             Creatinine Lvl (test code = Creatinine 1.15         0.50-1.40      

           



             Lvl)                                                



[facility]2017-01-24 16:51:00





             Test Item    Value        Reference Range Interpretation Comments

 

             BUN (test code = BUN) 16           -                      



[facility]2017-01-24 16:51:00





             Test Item    Value        Reference Range Interpretation Comments

 

             Chloride Lvl (test code = Chloride Lvl) 109                  

            



[facility]2017-01-24 16:51:00





             Test Item    Value        Reference Range Interpretation Comments

 

             Potassium Lvl (test code = Potassium 4.3          3.5-5.1          

         



             Lvl)                                                



[facility]2017-01-24 16:51:00





             Test Item    Value        Reference Range Interpretation Comments

 

             Sodium Lvl (test code = Sodium Lvl) 144          135-145           

        



[facility]2017-01-24 16:51:00





             Test Item    Value        Reference Range Interpretation Comments

 

             CO2 (test code = CO2)                      



[facility]2017-01-24 16:51:00





             Test Item    Value        Reference Range Interpretation Comments

 

             Calcium Lvl (test code = Calcium Lvl) 8.4          8.5-10.5        

          



[facility]2017-01-24 16:51:00





             Test Item    Value        Reference Range Interpretation Comments

 

             AGAP (test code = AGAP) 11.3         10.0-20.0                 



[facility]2017-01-24 16:51:00





             Test Item    Value        Reference Range Interpretation Comments

 

             Glucose Lvl (test code = Glucose Lvl) 109          70-99           

          



[facility]2017-01-24 16:51:00





             Test Item    Value        Reference Range Interpretation Comments

 

             eGFR (test code = eGFR) 52                                     



[facility]

## 2022-10-16 NOTE — RAD REPORT
EXAM DESCRIPTION:  CTAbdomen   Pelvis Wo Contrast - 10/16/2022 11:33 pm

 

CLINICAL HISTORY:

abdominal pain

 

COMPARISON:  [facility] Protocol dated 10/13/2022; Abdomen   Pelvis Wo Contrast dated 10/8/2022; Abdomen  
 Pelvis Wo Contrast dated 7/29/2022; Abdomen   Pelvis Wo Contrast dated 3/13/2022[facility] Protocol dated
 10/13/2022; Abdomen   Pelvis Wo Contrast dated 10/8/2022; Abdomen   Pelvis Wo Contrast dated 7/29/20
22; Abdomen   Pelvis Wo Contrast dated 3/13/2022; [facility] Protocol dated 1/5/2022

 

TECHNIQUE:  CT of the abdomen and pelvis was performed.

 

All CT scans are performed using dose optimization technique as appropriate and may include automated
 exposure control or mA/KV adjustment according to patient size.

 

FINDINGS:  Lower chest: Tiny right pleural effusion. Cardiomegaly. Coronary artery calcifications. Di
alysis catheter.

Liver: No acute abnormality or suspicious lesions.

Biliary: No biliary ductal dilatation.

Stomach: No significant focal abnormality.

Duodenum: No significant focal abnormality.

Pancreas: No significant abnormality.

Spleen: No significant abnormality.

Adrenal: No suspicious lesions.

Kidney/ureter: No hydronephrosis. Multiple calcifications in the left interpolar kidney and collectin
g system. Too small to characterize and/or benign appearing renal lesions are noted. Renal scarring.

Retroperitoneum: No retroperitoneal adenopathy.

Vascular: No aneurysm.

Bowel: Subacute/ chronic T11, T12, L2, L3, L4, and L5 compression fractures. . No appendicitis.

Peritoneum: Simple appearing fluid collection posterior to the right hepatic lobe is again identified
. This is unchanged.

Bladder: Grossly unremarkable.

Reproductive: No adnexal masses. Tubal ligation clips.

Bones: L1 kyphoplasty.

Other: n/a

 

IMPRESSION:  No acute intra-abdominal or pelvic finding.

 

Combination of subacute and chronic thoracic and lumbar compression fractures. No new acute fracture.

## 2022-10-17 VITALS — OXYGEN SATURATION: 100 %

## 2022-10-17 VITALS — TEMPERATURE: 97.7 F | SYSTOLIC BLOOD PRESSURE: 133 MMHG | DIASTOLIC BLOOD PRESSURE: 64 MMHG

## 2022-10-17 NOTE — ER
Nurse's Notes                                                                                     

 [facility]                                                                 

Name: Juli Rushing                                                                             

Age: 66 yrs                                                                                       

Sex: Female                                                                                       

: 1955                                                                                   

MRN: [id number]                                                                                   

Arrival Date: 10/16/2022                                                                          

Time: 17:59                                                                                       

Account#: [id number]                                                                            

Bed 25                                                                                            

Private MD:                                                                                       

Diagnosis: Right flank pain;Compression fractures of Thoracic and Lumbar vertebrae                

                                                                                                  

Presentation:                                                                                     

10/16                                                                                             

18:19 Chief complaint: Patient states: i am having a really stabbing pain in my right side    tw2 

      about waist level. just started today. i just got out of the hospital and told me that      

      was because i missed dialysis. was sick last week and couldn't do normal dialysis which     

      is //s. i make very little urine since dialysis, i do have a discharge that's gummy      

      like its brownish yellow and does have an odor. Coronavirus screen: At this time, the       

      client does not indicate any symptoms associated with coronavirus-19. Ebola Screen:         

      Patient denies travel to an Ebola-affected area in the 21 days before illness onset.        

      Initial Sepsis Screen: Does the patient meet any 2 criteria? No. Patient's initial          

      sepsis screen is negative. Does the patient have a suspected source of infection? No.       

      Patient's initial sepsis screen is negative. Risk Assessment: Do you want to hurt           

      yourself or someone else? Patient reports no desire to harm self or others. Onset of        

      symptoms was 2022.                                                              

18:19 Method Of Arrival: Wheelchair                                                           tw2 

18:19 Acuity: MIRTHA 3                                                                           tw2 

                                                                                                  

Triage Assessment:                                                                                

18:23 General: Appears in no apparent distress. uncomfortable, obese, Behavior is calm,       tw2 

      cooperative, appropriate for age. Pain: Complains of pain in right flank pain. Neuro:       

      Level of Consciousness is awake, alert, obeys commands, Oriented to person, place,          

      time, situation. : Reports discharge, malodorous, yellow.                                 

                                                                                                  

Historical:                                                                                       

- Allergies:                                                                                      

18:23 Bactrim;                                                                                tw2 

18:23 cefepime;                                                                               tw2 

18:23 Codeine;                                                                                tw2 

18:23 PENICILLINS;                                                                            tw2 

18:23 QUINOLONES;                                                                             tw2 

18:23 levofloxacin;                                                                           tw2 

18:23 Morphine; itching;                                                                      tw2 

- PMHx:                                                                                           

18:23 stage 4 kidney failure; High Cholesterol; Hypertensive disorder; Dialysis; Congestive   tw2 

      heart failure; Atrial fibrillation;                                                         

- PSHx:                                                                                           

18:23 back surgery; Dialysis catheter LEFT upper chest;                                       tw2 

                                                                                                  

- Immunization history:: Adult Immunizations.                                                     

- Social history:: Smoking status: .                                                              

                                                                                                  

                                                                                                  

Screenin:31 Abuse screen: Denies threats or abuse. Denies injuries from another. Nutritional        as6 

      screening: No deficits noted. Tuberculosis screening: No symptoms or risk factors           

      identified. Fall Risk None identified.                                                      

                                                                                                  

Assessment:                                                                                       

20:29 General: Appears in no apparent distress. Behavior is calm, cooperative. Pain:          as6 

      Complains of pain in right flank. Neuro: Level of Consciousness is awake, alert.            

      Respiratory: Respiratory effort is even, unlabored. GI: Reports nausea. : Reports.        

      : Reports inability to void. : Reports pain in right flank(s).                          

23:12 General: updated pt on plan of care, pending CT results .                               as6 

10/17                                                                                             

00:39 Reassessment: Patient is alert, oriented x 3, equal unlabored respirations, skin        bb  

      warm/dry/pink. awaiting urine results, family at bedside.                                   

01:17 Reassessment: Patient is alert, oriented x 3, equal unlabored respirations, skin        bb  

      warm/dry/pink. pt verbalized understanding of and agrees to plan of care discharge          

      instructions given pt assisted to exit via wheelchair accompanied by spouse.                

                                                                                                  

Vital Signs:                                                                                      

10/16                                                                                             

18:19  / 50; Pulse 80; Resp 17; Temp 98.4(TE); Pulse Ox 93% on R/A; Weight 111.58 kg    tw2 

      (R); Height 5 ft. 7 in. (170.18 cm); Pain 8/10;                                             

20:31  / 69; Pulse 68; Resp 18 S; Pulse Ox 99% on 2 lpm NC;                             as6 

21:45  / 84; Pulse 72; Resp 18 S; Pulse Ox 100% on R/A;                                 as6 

23:12  / 67; Pulse 68; Resp 16 S; Pulse Ox 100% on R/A;                                 as6 

10/17                                                                                             

00:39  / 64; Pulse 86; Resp 20 S; Temp 97.7(O); Pulse Ox 100% on 2 lpm NC;              bb  

10/16                                                                                             

18:19 Body Mass Index 38.53 (111.58 kg, 170.18 cm)                                            tw2 

10/16                                                                                             

18:19 pt states uses home o2 daily at 2L and 93 % is good for her, pt placed on 2l nc at this tw2 

      time                                                                                        

                                                                                                  

ED Course:                                                                                        

17:59 Patient arrived in ED.                                                                  ja2 

18:23 Triage completed.                                                                       tw2 

18:23 Arm band placed on.                                                                     tw2 

19:05 Jeancarlos Olmos DO is Attending Physician.                                                ms3 

19:56 John Akhtar, RN is Primary Nurse.                                                    as6 

20:30 Inserted saline lock: 20 gauge in right antecubital area, using aseptic technique.      as6 

      Blood collected.                                                                            

20:31 Bed in low position. Call light in reach.                                               as6 

23:35 Abdomen In Process Unspecified.                                                         EDMS

10/17                                                                                             

00:15 Straight cath inserted, using sterile technique, Specimen obtained. Returned scant      bb  

      amount of cream colored opaque urine.                                                       

00:40 Primary Nurse role handed off by John Akhtar, ELPIDIO                                     bb  

00:40 Jacey Beckham RN is Primary Nurse.                                                   bb  

01:18 No provider procedures requiring assistance completed. IV discontinued, intact,         bb  

      bleeding controlled, No redness/swelling at site. Pressure dressing applied.                

                                                                                                  

Administered Medications:                                                                         

10/16                                                                                             

20:31 Drug: Zofran (Ondansetron) 4 mg Route: IVP; Site: right antecubital;                    as6 

22:00 Follow up: Response: No adverse reaction                                                bb  

10/17                                                                                             

00:27 CANCELLED (Physician Discretion): Ketamine 10 mg IVP once                               ms3 

00:38 Drug: traMADol 50 mg Route: PO;                                                         bb  

01:16 Follow up: Response: No adverse reaction                                                bb  

                                                                                                  

                                                                                                  

Medication:                                                                                       

10/16                                                                                             

23:12 VIS not applicable for this client.                                                     as6 

                                                                                                  

Outcome:                                                                                          

10/17                                                                                             

00:59 Discharge ordered by MD.                                                                ms3 

01:18 Discharged to home via wheelchair, with family.                                         bb  

01:18 Condition: stable                                                                           

01:18 Discharge instructions given to patient, Instructed on discharge instructions, follow       

      up and referral plans. medication usage, Demonstrated understanding of instructions,        

      follow-up care, medications, Prescriptions given X 1.                                       

01:18 Patient left the ED.                                                                    bb  

                                                                                                  

Signatures:                                                                                       

Dispatcher [employer]                           EDMS                                                 

Jacey Beckham, ELPIDIO                     RN   bb                                                   

Claire Stovall RN                          RN   tw2                                                  

Jeancarlos Olmos DO                        DO   ms3                                                  

Alma Patton                           ja2                                                  

John Akhtar, ELPIDIO                      RN   as6                                                  

                                                                                                  

**************************************************************************************************

## 2022-10-17 NOTE — EDPHYS
Physician Documentation                                                                           

 [facility]                                                                 

Name: Juli Rushing                                                                             

Age: 66 yrs                                                                                       

Sex: Female                                                                                       

: 1955                                                                                   

MRN: [id number]                                                                                   

Arrival Date: 10/16/2022                                                                          

Time: 17:59                                                                                       

Account#: [id number]                                                                            

Bed 25                                                                                            

Private MD:                                                                                       

ED Physician Jeancarlos Olmos                                                                         

HPI:                                                                                              

10/16                                                                                             

22:53 This 66 yrs old Female presents to ER via Wheelchair with complaints of Flank Pain.     ms3 

22:53 66-year-old female with past medical history of end-stage renal disease,                ms3 

      hyperlipidemia, hypertension, atrial fibrillation, congestive heart failure presents        

      for right-sided flank pain that began at 7:30 AM. Patient states pain is an 8/10            

      described as sharp. Patient denies alleviating or inciting factors. Patient denies          

      fevers, chills, nausea, vomiting, diarrhea.                                                 

                                                                                                  

Historical:                                                                                       

- Allergies:                                                                                      

18:23 Bactrim;                                                                                tw2 

18:23 cefepime;                                                                               tw2 

18:23 Codeine;                                                                                tw2 

18:23 PENICILLINS;                                                                            tw2 

18:23 QUINOLONES;                                                                             tw2 

18:23 levofloxacin;                                                                           tw2 

18:23 Morphine; itching;                                                                      tw2 

- PMHx:                                                                                           

18:23 stage 4 kidney failure; High Cholesterol; Hypertensive disorder; Dialysis; Congestive   tw2 

      heart failure; Atrial fibrillation;                                                         

- PSHx:                                                                                           

18:23 back surgery; Dialysis catheter LEFT upper chest;                                       tw2 

                                                                                                  

- Immunization history:: Adult Immunizations.                                                     

- Social history:: Smoking status: .                                                              

                                                                                                  

                                                                                                  

ROS:                                                                                              

22:53 Constitutional: Negative for fever, and chills. Neck: Negative for injury, pain, and    ms3 

      swelling, Cardiovascular: Negative for chest pain, and palpitations. Respiratory:           

      Negative for shortness of breath, cough, wheezing, and pleuritic chest pain,                

      Abdomen/GI: Negative for abdominal pain, nausea, vomiting, diarrhea, and constipation.      

22:53 Skin: Negative for injury, rash, and discoloration.                                         

22:53 Back: Positive for flank pain, on the right.                                                

22:53 All other systems are negative.                                                             

                                                                                                  

Exam:                                                                                             

22:53 Constitutional:  This is a well developed, well nourished patient who is awake, alert,  ms3 

      and in no acute distress. Eyes:  Pupils equal round and reactive to light, extra-ocular     

      motions intact.  Lids and lashes normal.  Conjunctiva and sclera are non-icteric and        

      not injected.  Periorbital areas with no swelling, redness, or edema. ENT:  Nares           

      patent. No nasal discharge, no septal abnormalities noted.  Tympanic membranes are          

      normal and external auditory canals are clear.  Oropharynx with no redness, swelling,       

      or masses, exudates, or evidence of obstruction, uvula midline.  Mucous membranes           

      moist. Neck:  Trachea midline, no cervical lymphadenopathy.  Supple, full range of          

      motion without nuchal rigidity, or vertebral point tenderness.  No Meningismus.             

      Chest/axilla:  Normal chest wall appearance and motion.  Nontender with no deformity.       

      Cardiovascular:  Regular rate and rhythm with a normal S1 and S2.  No gallops, murmurs,     

      or rubs.  Normal PMI, no JVD.  No pulse deficits. Respiratory:  Lungs have equal breath     

      sounds bilaterally, clear to auscultation and percussion.  No rales, rhonchi or wheezes     

      noted.  No increased work of breathing, no retractions or nasal flaring. Abdomen/GI:        

      Soft, non-tender, with normal bowel sounds.  No distension or tympany.  No guarding or      

      rebound.  No evidence of tenderness throughout.                                             

22:53 Skin:  Warm, dry with normal turgor.  Normal color with no rashes, no lesions, and no       

      evidence of cellulitis. Psych:  Awake, alert, with orientation to person, place and         

      time.  Behavior, mood, and affect are within normal limits.                                 

22:53 Back: CVA tenderness, that is moderate, is noted on the right.                              

                                                                                                  

Vital Signs:                                                                                      

18:19  / 50; Pulse 80; Resp 17; Temp 98.4(TE); Pulse Ox 93% on R/A; Weight 111.58 kg    tw2 

      (R); Height 5 ft. 7 in. (170.18 cm); Pain 8/10;                                             

20:31  / 69; Pulse 68; Resp 18 S; Pulse Ox 99% on 2 lpm NC;                             as6 

21:45  / 84; Pulse 72; Resp 18 S; Pulse Ox 100% on R/A;                                 as6 

23:12  / 67; Pulse 68; Resp 16 S; Pulse Ox 100% on R/A;                                 as6 

10/17                                                                                             

00:39  / 64; Pulse 86; Resp 20 S; Temp 97.7(O); Pulse Ox 100% on 2 lpm NC;              bb  

10/16                                                                                             

18:19 Body Mass Index 38.53 (111.58 kg, 170.18 cm)                                            tw2 

10/16                                                                                             

18:19 pt states uses home o2 daily at 2L and 93 % is good for her, pt placed on 2l nc at this tw2 

      time                                                                                        

                                                                                                  

MDM:                                                                                              

19:26 Patient medically screened.                                                             ms3 

22:53 Differential diagnosis: nephrolithiasis, pyelonephritis, UTI.                           ms3 

10/17                                                                                             

00:59 Data reviewed: vital signs, nurses notes, lab test result(s), radiologic studies, and   ms3 

      as a result, I will discharge patient. Counseling: I had a detailed discussion with the     

      patient and/or guardian regarding: the historical points, exam findings, and any            

      diagnostic results supporting the discharge/admit diagnosis, lab results, radiology         

      results, the need for outpatient follow up, to return to the emergency department if        

      symptoms worsen or persist or if there are any questions or concerns that arise at          

      home. ED course: Discussed labs, imaging, physical exam findings with patient and her       

      . Patient to follow-up with her primary care physician in 2 to 3 days. Return        

      precautions discussed include worsening symptoms, or any other concerns. On                 

      reevaluation patient is alert and oriented, in no apparent distress, nontoxic,              

      ambulatory in the emergency department, speaking full sentences..                           

                                                                                                  

10/16                                                                                             

19:29 Order name: CBC with Diff; Complete Time: 22:53                                         ms3 

10/16                                                                                             

19:29 Order name: CMP; Complete Time: 22:53                                                   ms3 

10/16                                                                                             

19:29 Order name: Lipase; Complete Time: 22:53                                                ms3 

10/16                                                                                             

19:29 Order name: Urine Microscopic Only; Complete Time: 00:50                                ms3 

10/17                                                                                             

00:45 Order name: Urine Culture                                                               EDMS

10/16                                                                                             

19:29 Order name: IV Saline Lock; Complete Time: 20:31                                        ms3 

10/16                                                                                             

19:29 Order name: Labs collected and sent; Complete Time: 20:31                               ms3 

10/16                                                                                             

19:29 Order name: Urine Dipstick-Ancillary (obtain specimen); Complete Time: 21:44            ms3 

10/16                                                                                             

23:25 Order name: Abdomen ; Complete Time: 23:56                                              EDMS

                                                                                                  

Administered Medications:                                                                         

10/16                                                                                             

20:31 Drug: Zofran (Ondansetron) 4 mg Route: IVP; Site: right antecubital;                    as6 

22:00 Follow up: Response: No adverse reaction                                                bb  

10/17                                                                                             

00:27 CANCELLED (Physician Discretion): Ketamine 10 mg IVP once                               ms3 

00:38 Drug: traMADol 50 mg Route: PO;                                                         bb  

01:16 Follow up: Response: No adverse reaction                                                bb  

                                                                                                  

                                                                                                  

Disposition Summary:                                                                              

10/17/22 00:59                                                                                    

Discharge Ordered                                                                                 

      Location: Home                                                                          ms3 

      Condition: Stable                                                                       ms3 

      Diagnosis                                                                                   

        - Right flank pain                                                                    ms3 

        - Compression fractures of Thoracic and Lumbar vertebrae                              ms3 

      Followup:                                                                               ms3 

        - With: Private Physician                                                                  

        - When: 2 - 3 days                                                                         

        - Reason: Recheck today's complaints                                                       

      Discharge Instructions:                                                                     

        - Discharge Summary Sheet                                                             ms3 

        - Flank Pain, Adult                                                                   ms3 

      Forms:                                                                                      

        - Medication Reconciliation Form                                                      ms3 

        - Thank You Letter                                                                    ms3 

        - Antibiotic Education                                                                ms3 

        - Prescription Opioid Use                                                             ms3 

      Prescriptions:                                                                              

        - ondansetron 4 mg Oral tablet,disintegrating                                              

            - take 1 tablet by ORAL route every 8 hours; 15 tablet; Refills: 0, Product       ms3 

      Selection Permitted                                                                         

Signatures:                                                                                       

Dispatcher [employer]                           Jacey Mckinnon RN RN   bb                                                   

Claire Stovall RN                          RN   tw2                                                  

Jeancarlos Olmos DO                        DO   ms3                                                  

John Akhtar RN                      RN   as6                                                  

                                                                                                  

Corrections: (The following items were deleted from the chart)                                    

00:27 00:18 Ketamine 10 mg IVP once ordered. ms3                                              ms3 

                                                                                                  

**************************************************************************************************

## 2022-11-11 ENCOUNTER — HOSPITAL ENCOUNTER (INPATIENT)
Dept: HOSPITAL 97 - ER | Age: 67
LOS: 3 days | Discharge: HOME | DRG: 640 | End: 2022-11-14
Attending: INTERNAL MEDICINE | Admitting: INTERNAL MEDICINE
Payer: COMMERCIAL

## 2022-11-11 VITALS — BODY MASS INDEX: 33.5 KG/M2

## 2022-11-11 DIAGNOSIS — Z88.5: ICD-10-CM

## 2022-11-11 DIAGNOSIS — N25.81: ICD-10-CM

## 2022-11-11 DIAGNOSIS — Z20.822: ICD-10-CM

## 2022-11-11 DIAGNOSIS — N18.6: ICD-10-CM

## 2022-11-11 DIAGNOSIS — I13.2: ICD-10-CM

## 2022-11-11 DIAGNOSIS — E87.70: Primary | ICD-10-CM

## 2022-11-11 DIAGNOSIS — Z99.2: ICD-10-CM

## 2022-11-11 DIAGNOSIS — E78.5: ICD-10-CM

## 2022-11-11 DIAGNOSIS — Z98.51: ICD-10-CM

## 2022-11-11 DIAGNOSIS — R06.03: ICD-10-CM

## 2022-11-11 DIAGNOSIS — Z99.89: ICD-10-CM

## 2022-11-11 DIAGNOSIS — I50.32: ICD-10-CM

## 2022-11-11 DIAGNOSIS — I48.91: ICD-10-CM

## 2022-11-11 DIAGNOSIS — Z28.310: ICD-10-CM

## 2022-11-11 DIAGNOSIS — D63.1: ICD-10-CM

## 2022-11-11 DIAGNOSIS — G47.33: ICD-10-CM

## 2022-11-11 DIAGNOSIS — Z91.15: ICD-10-CM

## 2022-11-11 DIAGNOSIS — Z88.1: ICD-10-CM

## 2022-11-11 DIAGNOSIS — Z79.01: ICD-10-CM

## 2022-11-11 DIAGNOSIS — Z90.49: ICD-10-CM

## 2022-11-11 DIAGNOSIS — I95.3: ICD-10-CM

## 2022-11-11 DIAGNOSIS — Z88.0: ICD-10-CM

## 2022-11-11 LAB
BUN BLD-MCNC: 25 MG/DL (ref 7–18)
GLUCOSE SERPLBLD-MCNC: 92 MG/DL (ref 74–106)
HCT VFR BLD CALC: 29.7 % (ref 36–45)
LYMPHOCYTES # SPEC AUTO: 1.2 K/UL (ref 0.7–4.9)
MCV RBC: 102.1 FL (ref 80–100)
PMV BLD: 8.7 FL (ref 7.6–11.3)
POTASSIUM SERPL-SCNC: 4.7 MMOL/L (ref 3.5–5.1)
RBC # BLD: 2.9 M/UL (ref 3.86–4.86)
TROPONIN I SERPL HS-MCNC: 16.5 PG/ML (ref ?–58.9)

## 2022-11-11 PROCEDURE — 36415 COLL VENOUS BLD VENIPUNCTURE: CPT

## 2022-11-11 PROCEDURE — 80053 COMPREHEN METABOLIC PANEL: CPT

## 2022-11-11 PROCEDURE — 82947 ASSAY GLUCOSE BLOOD QUANT: CPT

## 2022-11-11 PROCEDURE — 80048 BASIC METABOLIC PNL TOTAL CA: CPT

## 2022-11-11 PROCEDURE — 87811 SARS-COV-2 COVID19 W/OPTIC: CPT

## 2022-11-11 PROCEDURE — 99285 EMERGENCY DEPT VISIT HI MDM: CPT

## 2022-11-11 PROCEDURE — 71045 X-RAY EXAM CHEST 1 VIEW: CPT

## 2022-11-11 PROCEDURE — 84484 ASSAY OF TROPONIN QUANT: CPT

## 2022-11-11 PROCEDURE — 90935 HEMODIALYSIS ONE EVALUATION: CPT

## 2022-11-11 PROCEDURE — 85025 COMPLETE CBC W/AUTO DIFF WBC: CPT

## 2022-11-11 PROCEDURE — 93005 ELECTROCARDIOGRAM TRACING: CPT

## 2022-11-11 RX ADMIN — APIXABAN SCH MG: 2.5 TABLET, FILM COATED ORAL at 21:50

## 2022-11-11 RX ADMIN — Medication SCH: at 21:00

## 2022-11-11 RX ADMIN — ATORVASTATIN CALCIUM SCH MG: 10 TABLET, FILM COATED ORAL at 21:50

## 2022-11-11 RX ADMIN — METOPROLOL SUCCINATE SCH MG: 25 TABLET, EXTENDED RELEASE ORAL at 21:50

## 2022-11-11 NOTE — P.CNS
Date of Consult: 11/11/22


Reason for Consult: esrd


Requesting Physician: Anthony Elkins


Primary Care Provider: Brittni


Chief Complaint: angina, esrd


History of Present Illness: 





66F w/ PMHx of ESRD on HD TTS, chronic diastolic heart failure, afib, anemia, & 

renal osteodystrophy, who p/w L-sided chest pain, admitted for further cardiac 

eval.  Received HD yesterday.





Allergies





cefepime Allergy (Verified 01/05/22 06:30)


   Hives


codeine Allergy (Verified 07/30/22 05:03)


   Itching


levofloxacin Allergy (Verified 01/05/22 06:30)


   Hives/Rash


morphine Allergy (Verified 07/30/22 05:03)


   Itching


Penicillins Allergy (Verified 01/05/22 06:30)


   Hives/Rash


sulfamethoxazole [From Bactrim] Allergy (Verified 07/30/22 05:03)


   Hives


trimethoprim [From Bactrim] Allergy (Verified 07/30/22 05:03)


   Hives





Home Medications: 








Apixaban [Eliquis] 2.5 mg PO BID 07/29/22 


Bumetanide 1 tab PO DAILY 07/29/22 


Digoxin [Lanoxin] 1 tab PO T,TH,S 07/29/22 


Metoprolol Tartrate 1 tab PO BID 07/29/22 


Midodrine HCl 1 tab PO TID 07/29/22 


Pantoprazole [Protonix Tab*] 1 tab PO BID 07/29/22 


Sevelamer Carbonate [Renvela] 800 mg PO TID 07/29/22 


Dicyclomine [Bentyl*] 1 tab PO TID PRN 11/11/22 


Ondansetron [Zofran (Odt)*] 8 mg PO TID PRN 11/11/22 


Tizanidine [Zanaflex*] 1 tab PO BID 11/11/22 








- Past Medical/Surgical History


Diabetic: No


-: Chronic hypotension


-: Hyperlipidemia


-: Chronic anticoagulation use


-: History of nephrolithiasis requiring stent placement


-: Recurrent UTI


-: End-stage renal disease


-: CHF


-: HTN


-: A-Fib


-: tubal ligation


-: dialysis port


-: cholecystectomy


-: right broken wrist


-: lasik


-: cataracts removed





- Family History


  ** Sister


Medical History: Cancer


Notes: per pt, sister has lung cancer and is being treated for a "spot on her 

brain"





- Social History


Alcohol use: No


CD- Drugs: No


Caffeine use: Yes


Place of Residence: Home





Review of Systems


General: Weakness


Eyes: Unremarkable


ENT: Unremarkable


Respiratory: Unremarkable


Cardiovascular: Chest Pain


Gastrointestinal: Unremarkable


Genitourinary: Unremarkable


Musculoskeletal: Unremarkable


Integumentary: Unremarkable


Neurological: Unremarkable


Lymphatics: Unremarkable





Physical Examination














Temp Pulse Resp BP Pulse Ox


 


 98.4 F   65   20   110/60    


 


 11/11/22 11:06  11/11/22 17:27  11/11/22 17:27  11/11/22 17:27   








General: Other (NAD)


HEENT: Atraumatic, Normocephalic


Neck: Supple, JVD not distended


Respiratory: Other (Symmetric chest expansion)


Cardiovascular: No rubs, No murmurs


Gastrointestinal: Soft and benign, No guarding


Musculoskeletal: No clubbing


Integumentary: No warmth


Neurological: Normal speech, Normal tone


Urinary: Other (No bladder distention)


External genitalia: Deferred


Rectal: Deferred


Laboratory Data (last 24 hrs)





11/11/22 12:10: WBC 6.50, Hgb 9.4 L, Hct 29.7 L, Plt Count 103 L


11/11/22 12:10: Sodium 137, Potassium 4.7, BUN 25 H, Creatinine 5.77 H*, Glucose

 92





Conclusions/Impression: 





1. End-stage renal disease on outpt HD TTS.  HD access: L TDC.  .5 kgs.  

Received HD yesterday.  HD tomorrow.


2. Chest pain.  L-sided, over L TDC site.  Per other services.


3. Secondary hyperparathyroidism.  Recheck PTH + 25OHD as outpt


4. Anemia of chronic kidney disease. Continue LENARD.


4. Hypertension.  Cont current med regimen.


5. Chronic diastolic HF, afib.  Per other services.


6. Intradialytic hypotension. Continue midodrine.


7. Sleep apnea.  Per other services.

## 2022-11-11 NOTE — EDPHYS
Physician Documentation                                                                           

 [facility]                                                                 

Name: Juli Rushing                                                                             

Age: 66 yrs                                                                                       

Sex: Female                                                                                       

: 1955                                                                                   

MRN: [id number]                                                                                   

Arrival Date: 2022                                                                          

Time: 11:04                                                                                       

Account#: [id number]                                                                            

Bed 6                                                                                             

Private MD: Anthony Elkins                                                                         

ED Physician Kyler Brink                                                                       

HPI:                                                                                              

                                                                                             

19:35 This 66 yrs old Female presents to ER via Wheelchair with complaints of Chest Pain.     kdr 

19:36 Patient presents to the ED complaining of chest pain that began 1/2 weeks ago. Patient  kdr 

      reports that she was in [facility] and was released after a few days admission     

      where and they ruled out an MI. I was about 5 days ago. She has follow-up appointment       

      with her cardiologist (Dr. Barajas) today and he told her to come to the ER for further      

      treatment.. Onset: The symptoms/episode began/occurred gradually, 1.5 week(s) ago.          

      Severity of symptoms: At their worst the symptoms were mild moderate just prior to          

      arrival, in the emergency department the symptoms are unchanged. The patient has            

      experienced similar episodes in the past, chronically. The patient has been recently        

      seen by a physician: It is unknown whether or not the patient has recently seen a           

      physician. Patient states that more than the chest discomfort which was seems to be         

      chronic, she is having shortness of breath which occurs with even short distance..          

                                                                                                  

Historical:                                                                                       

- Allergies:                                                                                      

11:09 Bactrim;                                                                                ss  

11:09 cefepime;                                                                               ss  

11:09 Codeine;                                                                                ss  

11:09 Levofloxacin;                                                                           ss  

11:09 Morphine; itching;                                                                      ss  

11:09 PENICILLINS;                                                                            ss  

11:09 QUINOLONES;                                                                             ss  

- PMHx:                                                                                           

11:09 Atrial fibrillation; Congestive heart failure; Dialysis; High Cholesterol; Hypertensive ss  

      disorder; stage 4 kidney failure;                                                           

- PSHx:                                                                                           

11:09 back surgery; Dialysis catheter LEFT upper chest;                                       ss  

                                                                                                  

- Immunization history:: Client reports having NOT received the Covid vaccine.                    

- Social history:: Smoking status: Patient denies any tobacco usage or history of.                

                                                                                                  

                                                                                                  

ROS:                                                                                              

19:36 Constitutional: Negative for fever, chills, and weight loss, Eyes: Negative for injury, kdr 

      pain, redness, and discharge, Neck: Negative for injury, pain, and swelling,                

      Abdomen/GI: Negative for abdominal pain, nausea, vomiting, diarrhea, and constipation,      

      Back: Negative for injury and pain, : Negative for injury, bleeding, discharge, and       

      swelling, MS/Extremity: Negative for injury and deformity, Skin: Negative for injury,       

      rash, and discoloration, Neuro: Negative for headache, weakness, numbness, tingling,        

      and seizure activity. Psych: Negative for depression, anxiety, suicide ideation,            

      homicidal ideation, and hallucinations, Allergy/Immunology: Negative for hives, rash,       

      and allergies, Endocrine: Negative for neck swelling, polydipsia, polyuria, polyphagia,     

      and marked weight changes, Hematologic/Lymphatic: Negative for swollen nodes, abnormal      

      bleeding, and unusual bruising.                                                             

19:36 Cardiovascular: Positive for chest pain, edema, Negative for orthopnea, palpitations,       

      paroxysmal nocturnal dyspnea.                                                               

19:36 Respiratory: Positive for dyspnea on exertion, shortness of breath, Negative for            

      hemoptysis, orthopnea, pleurisy.                                                            

                                                                                                  

Exam:                                                                                             

19:36 Constitutional:  This is a well developed, well nourished patient who is awake, alert,  kdr 

      and in no acute distress. Head/Face:  Normocephalic, atraumatic. Eyes:  Pupils equal        

      round and reactive to light, extra-ocular motions intact.  Lids and lashes normal.          

      Conjunctiva and sclera are non-icteric and not injected.  Cornea within normal limits.      

      Periorbital areas with no swelling, redness, or edema. Neck:  Trachea midline, no           

      thyromegaly or masses palpated, and no cervical lymphadenopathy.  Supple, full range of     

      motion without nuchal rigidity, or vertebral point tenderness.  No Meningismus.             

      Chest/axilla:  Normal chest wall appearance and motion.  Nontender with no deformity.       

      No lesions are appreciated. Cardiovascular:  Regular rate and rhythm with a normal S1       

      and S2.  No gallops, murmurs, or rubs.  Normal PMI, no JVD.  No pulse deficits.             

      Respiratory:  Lungs have equal breath sounds bilaterally, clear to auscultation and         

      percussion.  No rales, rhonchi or wheezes noted.  No increased work of breathing, no        

      retractions or nasal flaring. Abdomen/GI:  Soft, non-tender, with normal bowel sounds.      

      No distension or tympany.  No guarding or rebound.  No evidence of tenderness               

      throughout. Back:  No spinal tenderness.  No costovertebral tenderness.  Full range of      

      motion. Skin:  Warm, dry with normal turgor.  Normal color with no rashes, no lesions,      

      and no evidence of cellulitis. MS/ Extremity:  Pulses equal, no cyanosis.                   

      Neurovascular intact.  Full, normal range of motion. Neuro:  Awake and alert, GCS 15,       

      oriented to person, place, time, and situation.  Cranial nerves II-XII grossly intact.      

      Motor strength 5/5 in all extremities.  Sensory grossly intact.  Cerebellar exam            

      normal.  Normal gait. Psych:  Awake, alert, with orientation to person, place and time.     

       Behavior, mood, and affect are within normal limits.                                       

19:36 Cardiovascular: Rate: normal, Edema: 2+ edema to level of left midcalf, left ankle,         

      left foot, left toes, right midcalf, right ankle, right foot and right toes.                

                                                                                                  

Vital Signs:                                                                                      

11:06 BP 97 / 55; Pulse 55; Resp 18; Temp 98.4(TE); Pulse Ox 99% ; Weight 107.95 kg; Height 5 ss  

      ft. 7 in. (170.18 cm); Pain 8/10;                                                           

13:08  / 96; Pulse 67; Resp 22 S; Pulse Ox 96% on R/A;                                  jd3 

14:07 BP 88 / 55; Pulse 68; Resp 21; Pulse Ox 97% on R/A;                                     jd3 

17:27  / 60; Pulse 65; Resp 20; Pulse Ox 100% on R/A;                                   jd3 

11:06 Body Mass Index 37.28 (107.95 kg, 170.18 cm)                                            ss  

                                                                                                  

MDM:                                                                                              

14:38 Patient medically screened.                                                             kdr 

19:36 Data reviewed: vital signs, nurses notes, lab test result(s), EKG, radiologic studies.  kdr 

      Counseling: I had a detailed discussion with the patient and/or guardian regarding: the     

      historical points, exam findings, and any diagnostic results supporting the                 

      discharge/admit diagnosis, lab results, radiology results, the need for further work-up     

      and treatment in the hospital.                                                              

                                                                                                  

                                                                                             

11:40 Order name: Basic Metabolic Panel; Complete Time: 13:46                                 kdr 

                                                                                             

11:40 Order name: CBC with Diff; Complete Time: 13:46                                         kdr 

                                                                                             

11:40 Order name: Troponin HS; Complete Time: 13:46                                           kdr 

                                                                                             

11:40 Order name: XRAY Chest (1 view); Complete Time: 13:46                                   kdr 

                                                                                             

15:03 Order name: SARS RAPID; Complete Time: 16:08                                            jd3 

                                                                                             

16:52 Order name: Troponin High Sensitivity                                                   EDMS

                                                                                             

11:10 Order name: EKG; Complete Time: 11:11                                                   ss  

                                                                                             

11:10 Order name: EKG - Nurse/Tech; Complete Time: 13:21                                      ss  

                                                                                             

11:40 Order name: Cardiac monitoring; Complete Time: 12:02                                    kdr 

                                                                                             

16:29 Order name: CONS Physician Consult                                                      EDMS

                                                                                             

16:29 Order name: CONS Physician Consult                                                      EDMS

                                                                                             

16:29 Order name: Renal                                                                       EDMS

                                                                                             

11:40 Order name: Labs collected and sent; Complete Time: 12:12                               kdr 

                                                                                             

11:40 Order name: O2 Per Protocol; Complete Time: 12:02                                       kdr 

                                                                                             

11:40 Order name: O2 Sat Monitoring; Complete Time: 12:02                                     kdr 

                                                                                                  

Administered Medications:                                                                         

13:38 Drug: Ondansetron 4 mg Route: PO;                                                       jd3 

14:30 Follow up: Response: No adverse reaction                                                jd3 

                                                                                                  

                                                                                                  

Disposition Summary:                                                                              

22 14:38                                                                                    

Hospitalization Ordered                                                                           

      Hospitalization Status: Inpatient Admission                                             kdr 

      Provider: Anthony Elkins 

      Location: Telemetry/MedSurg (Inpatient)                                                 kdr 

      Condition: Fair                                                                         kdr 

      Problem: an ongoing problem                                                             kdr 

      Symptoms: are unchanged                                                                 kdr 

      Bed/Room Type: Standard                                                                 kdr 

      Room Assignment: 214(22 16:35)                                                    eb  

      Diagnosis                                                                                   

        - Shortness of breath                                                                 kdr 

        - Chest pain, unspecified                                                             kdr 

      Forms:                                                                                      

        - Medication Reconciliation Form                                                      kdr 

        - SBAR form                                                                           kdr 

Signatures:                                                                                       

Dispatcher [employer]                           EDMS                                                 

Kyler Brink MD MD   kdr                                                  

Lili Price RN RN ss Davies, Jonathon, RN                    RN   jKaykay Guzman                                                   

                                                                                                  

Corrections: (The following items were deleted from the chart)                                    

16:35 14:38 kdr                                                                               eb  

                                                                                                  

**************************************************************************************************

## 2022-11-11 NOTE — P.HP
Certification for Inpatient


Patient admitted to: Inpatient


With expected LOS: >2 Midnights


Patient will require the following post-hospital care: None


Practitioner: I am a practitioner with admitting privileges, knowledge of 

patient current condition, hospital course, and medical plan of care.


Services: Services provided to patient in accordance with Admission requirements

found in Title 42 Section 412.3 of the Code of Federal Regulations





Patient History


Date of Service: 22


Primary Care Provider: Brittni


Reason for admission: angina, esrd


History of Present Illness: 





Patient is an office patient of mine.  She has a history of chf, diastolic 

dysfunction, Sleep apnea, afib and htn.  She evidently has been having chest p

ain for the past 10days.  Went to a cardiologist in [facility].  Was admitted and

had a chest pain rule out.  The patient was discharged.  She was still having 

pain. Describes it as left sided, non radiating.  Currently a 8/10.  She states 

it is constant at this point.  Was on exertion to begin with.  


Allergies





cefepime Allergy (Verified 22 06:30)


   Hives


codeine Allergy (Verified 22 05:03)


   Itching


levofloxacin Allergy (Verified 22 06:30)


   Hives/Rash


morphine Allergy (Verified 22 05:03)


   Itching


Penicillins Allergy (Verified 22 06:30)


   Hives/Rash


sulfamethoxazole [From Bactrim] Allergy (Verified 22 05:03)


   Hives


trimethoprim [From Bactrim] Allergy (Verified 22 05:03)


   Hives





Home Medications: 








Apixaban [Eliquis] 2.5 mg PO BID 22 


Atorvastatin Calcium 1 tab PO BEDTIME 22 


Brimonidine Tartrate [Alphagan P] 1 drop EACH EYE BID 22 


Bumetanide 1 tab PO DAILY 22 


Diclofenac Na [Voltaren D.r*] 1 tab PO BID 22 


Digoxin [Lanoxin] 1 tab PO T,TH,S 22 


Folic Acid 1 tab PO DAILY 22 


Glucosamine/D3/Boswellia Pauline [Osteo Bi-Flex Tablet] 1 tab PO BID 22 


Melatonin 1 tab SL BEDTIME 22 


Metoprolol Tartrate 1 tab PO BID 22 


Midodrine HCl 1 tab PO TID 22 


Pantoprazole [Protonix Tab*] 1 tab PO DAILY 22 


Promethazine HCl 1 tab PO TID PRN 22 


Sevelamer Carbonate [Renvela] 800 mg PO TID 22 


Sucroferric Oxyhydroxide [Velphoro] 500 mg PO TID 22 


Tramadol HCl [Ultram] 100 mg PO Q12H 22 


Diphenox/Atropine [Lomotil] 1 tab PO TIDP PRN 7 Days #20 tab 22 


Promethazine Tab [Phenergan] 25 mg PO Q6HP PRN 7 Days #20 tab 22 








- Past Medical/Surgical History


Diabetic: No


-: Chronic hypotension


-: Hyperlipidemia


-: Chronic anticoagulation use


-: History of nephrolithiasis requiring stent placement


-: Recurrent UTI


-: End-stage renal disease


-: CHF


-: HTN


-: A-Fib


-: tubal ligation


-: dialysis port


-: cholecystectomy


-: right broken wrist


-: lasik


-: cataracts removed





- Family History


  ** Sister


-: Cancer


Notes: per pt, sister has lung cancer and is being treated for a "spot on her 

brain"





- Social History


Alcohol use: No


CD- Drugs: No


Caffeine use: Yes





Review of Systems


10-point ROS is otherwise unremarkable


Cardiovascular: Chest Pain





Physical Examination





- Physical Exam


General: Alert, In no apparent distress


HEENT: Atraumatic, PERRLA, Mucous membr. moist/pink, EOMI, Sclerae nonicteric


Neck: Supple, 2+ carotid pulse no bruit, No LAD, Without JVD or thyroid 

abnormality


Respiratory: Clear to auscultation bilaterally, Normal air movement


Cardiovascular: Regular rate/rhythm, Normal S1 S2


Gastrointestinal: Normal bowel sounds, No tenderness


Musculoskeletal: No tenderness


Integumentary: No rashes


Neurological: Normal gait, Normal speech, Normal strength at 5/5 x4 extr, Normal

 tone, Normal affect


Lymphatics: No axilla or inguinal lymphadenopathy





- Studies


Laboratory Data (last 24 hrs)





22 12:10: WBC 6.50, Hgb 9.4 L, Hct 29.7 L, Plt Count 103 L


22 12:10: Sodium 137, Potassium 4.7, BUN 25 H, Creatinine 5.77 H*, Glucose

 92








Assessment and Plan





- Problems (Diagnosis)


(1) Angina at rest


Current Visit: Yes   Status: Acute   


Plan: 


will admit the patient.  Check serial troponins. She was sent to the ER from a 

office visit with Dr. Barajas.  Will discuss the plan with him 








(2) CHF (congestive heart failure)


Current Visit: No   Status: Chronic   


Plan: 


1 + edema.  She is doing well.  Her lungs are clear.  Will restart her 

metoprolol.  


Qualifiers: 


   Heart failure type: diastolic   Heart failure chronicity: chronic   Qualified

 Code(s): I50.32 - Chronic diastolic (congestive) heart failure   





(3) ESRD (end stage renal disease) on dialysis


Current Visit: No   Status: Chronic   


Plan: 


discussed with Dr. Whelan. She is a TTS dialysis patient 








(4) Sleep apnea


Current Visit: No   Status: Acute   


Plan: 


will order a cpap for the patient, she can also bring her home cpap 


Qualifiers: 


   Primary sleep apnea of  type: obstructive 





- Advance Directives


Does patient have a Living Will: No


Does patient have a Durable POA for Healthcare: No





- Code Status/Comfort Care


Code Status Assessed: Yes


Code Status: Full Code


Physician Review: Patient Assessed, Agree with Above Assessment and Plan


Critical Care: No


Time Spent Managing Pts Care (In Minutes): 50

## 2022-11-11 NOTE — ER
Nurse's Notes                                                                                     

 [facility]                                                                 

Name: Juli Rushing                                                                             

Age: 66 yrs                                                                                       

Sex: Female                                                                                       

: 1955                                                                                   

MRN: [id number]                                                                                   

Arrival Date: 2022                                                                          

Time: 11:04                                                                                       

Account#: [id number]                                                                            

Bed 6                                                                                             

Private MD: Anthony Elkins                                                                         

Diagnosis: Shortness of breath;Chest pain, unspecified                                            

                                                                                                  

Presentation:                                                                                     

                                                                                             

11:06 Chief complaint: Patient states: Chest pain that has been ongoing x 1.5 weeks. PT       ss  

      reports she was seen in [facility] and released after ruling out an MI 5 days       

      ago. PT states she had a follow up appointment with her cardiologist today and told her     

      to come to the ER for further treatment. Coronavirus screen: Client denies travel out       

      of the [place] in the last 14 days. Ebola Screen: Patient denies exposure to infectious        

      person. Patient denies travel to an Ebola-affected area in the 21 days before illness       

      onset. Initial Sepsis Screen: Does the patient meet any 2 criteria? No. Patient's           

      initial sepsis screen is negative. Does the patient have a suspected source of              

      infection? No. Patient's initial sepsis screen is negative. Risk Assessment: Do you         

      want to hurt yourself or someone else? Patient reports no desire to harm self or            

      others. Onset of symptoms was 2022.                                            

11:06 Method Of Arrival: Wheelchair                                                           ss  

11:06 Acuity: MIRTHA 2                                                                           ss  

                                                                                                  

Historical:                                                                                       

- Allergies:                                                                                      

11:09 Bactrim;                                                                                ss  

11:09 cefepime;                                                                               ss  

11:09 Codeine;                                                                                ss  

11:09 Levofloxacin;                                                                           ss  

11:09 Morphine; itching;                                                                      ss  

11:09 PENICILLINS;                                                                            ss  

11:09 QUINOLONES;                                                                             ss  

- PMHx:                                                                                           

11:09 Atrial fibrillation; Congestive heart failure; Dialysis; High Cholesterol; Hypertensive ss  

      disorder; stage 4 kidney failure;                                                           

- PSHx:                                                                                           

11:09 back surgery; Dialysis catheter LEFT upper chest;                                       ss  

                                                                                                  

- Immunization history:: Client reports having NOT received the Covid vaccine.                    

- Social history:: Smoking status: Patient denies any tobacco usage or history of.                

                                                                                                  

                                                                                                  

Screenin:24 Abuse screen: Denies threats or abuse. Nutritional screening: No deficits noted.        jd3 

      Tuberculosis screening: No symptoms or risk factors identified. Fall Risk Ambulatory        

      Aid- None/Bed Rest/Nurse Assist (0 pts). Gait- Normal/Bed Rest/Wheelchair (0 pts)           

      Mental Status- Oriented to own ability (0 pts). Total Gates Fall Scale indicates No         

      Risk (0-24 pts).                                                                            

                                                                                                  

Assessment:                                                                                       

12:00 General: Appears in no apparent distress. comfortable, Behavior is calm, cooperative,   jd3 

      appropriate for age. Pain: Complains of pain in chest Pain does not radiate. Quality of     

      pain is described as pressure, Pain began X 10 days. Neuro: Drummond Agitation-Sedation     

      Scale (RASS): 0 - Alert and Calm Level of Consciousness is awake, alert, obeys              

      commands, Oriented to person, place, time, situation. Cardiovascular: Heart tones           

      present Capillary refill < 3 seconds Patient's skin is warm and dry. Rhythm is atrial       

      fibrillation. Respiratory: Reports shortness of breath at rest Airway is patent             

      Respiratory effort is even, unlabored, Respiratory pattern is regular, symmetrical,         

      Breath sounds are diminished bilaterally. GI: No signs and/or symptoms were reported        

      involving the gastrointestinal system. : No signs and/or symptoms were reported           

      regarding the genitourinary system. EENT: No signs and/or symptoms were reported            

      regarding the EENT system. Derm: Skin is intact, Skin is dry, Skin is normal, Skin          

      temperature is warm. Musculoskeletal: Circulation, motion, and sensation intact. Range      

      of motion: intact in all extremities.                                                       

13:08 Reassessment: Patient appears in no apparent distress at this time. Patient and/or      vg1 

      family updated on plan of care and expected duration. Pain level reassessed. Patient is     

      alert, oriented x 3, equal unlabored respirations, skin warm/dry/pink.                      

14:07 Reassessment: Patient appears in no apparent distress at this time. No changes from     jd3 

      previously documented assessment. Patient and/or family updated on plan of care and         

      expected duration. Pain level reassessed. Patient is alert, oriented x 3, equal             

      unlabored respirations, skin warm/dry/pink.                                                 

15:00 Reassessment: Patient appears in no apparent distress at this time. Patient and/or      jd3 

      family updated on plan of care and expected duration. Pain level reassessed. Patient is     

      alert, oriented x 3, equal unlabored respirations, skin warm/dry/pink. awaiting             

      admission.                                                                                  

16:00 Reassessment: Patient appears in no apparent distress at this time. No changes from     jd3 

      previously documented assessment. Patient and/or family updated on plan of care and         

      expected duration. Pain level reassessed. Patient is alert, oriented x 3, equal             

      unlabored respirations, skin warm/dry/pink.                                                 

17:00 Reassessment: Patient appears in no apparent distress at this time. No changes from     jd3 

      previously documented assessment. Patient and/or family updated on plan of care and         

      expected duration. Pain level reassessed. Patient is alert, oriented x 3, equal             

      unlabored respirations, skin warm/dry/pink.                                                 

17:33 Reassessment: Patient and/or family updated on plan of care and expected duration. Pain jd3 

      level reassessed. Patient is alert, oriented x 3, equal unlabored respirations, skin        

      warm/dry/pink. report called to Evita MAGALLANES nurse for room 214.                               

                                                                                                  

Vital Signs:                                                                                      

11:06 BP 97 / 55; Pulse 55; Resp 18; Temp 98.4(TE); Pulse Ox 99% ; Weight 107.95 kg; Height 5 ss  

      ft. 7 in. (170.18 cm); Pain 8/10;                                                           

13:08  / 96; Pulse 67; Resp 22 S; Pulse Ox 96% on R/A;                                  jd3 

14:07 BP 88 / 55; Pulse 68; Resp 21; Pulse Ox 97% on R/A;                                     jd3 

17:27  / 60; Pulse 65; Resp 20; Pulse Ox 100% on R/A;                                   jd3 

11:06 Body Mass Index 37.28 (107.95 kg, 170.18 cm)                                              

                                                                                                  

ED Course:                                                                                        

11:04 Patient arrived in ED.                                                                  am2 

11:04 Anthony Elkins MD is Private Physician.                                                 am2 

11:09 Triage completed.                                                                       ss  

11:09 Arm band placed on right wrist.                                                         ss  

11:39 Kyler Brink MD is Attending Physician.                                              kdr 

12:00 Hunter Mercado RN is Primary Nurse.                                                  jd3 

12:32 XRAY Chest (1 view) In Process Unspecified.                                             EDMS

13:25 Patient has correct armband on for positive identification. Bed in low position. Call   jd3 

      light in reach. Side rails up X 1. Adult w/ patient. Client placed on continuous            

      cardiac and pulse oximetry monitoring. NIBP monitoring applied. Cardiac monitor on.         

      Pulse ox on. NIBP on. Warm blanket given.                                                   

13:25 Patient maintains SpO2 saturation greater than 95% on room air.                         jd3 

14:37 Anthony Elkins MD is Hospitalizing Provider.                                            kdr 

17:34 Patient admitted, IV remains in place.                                                  jd3 

17:35 No provider procedures requiring assistance completed.                                  jd3 

                                                                                                  

Administered Medications:                                                                         

13:38 Drug: Ondansetron 4 mg Route: PO;                                                       jd3 

14:30 Follow up: Response: No adverse reaction                                                jd3 

                                                                                                  

                                                                                                  

Medication:                                                                                       

13:24 VIS not applicable for this client.                                                     jd3 

                                                                                                  

Outcome:                                                                                          

14:38 Decision to Hospitalize by Provider.                                                    kdr 

17:35 Admitted to Tele accompanied by tech, via wheelchair, room 214, with chart, Report      jd3 

      called to  Evita MAGALLANES                                                                        

17:35 Condition: stable                                                                           

17:35 Instructed on the need for admit, Demonstrated understanding of instructions.               

17:37 Patient left the ED.                                                                    jd3 

                                                                                                  

Signatures:                                                                                       

Dispatcher [employer]                           EDMS                                                 

Kyler Brink MD MD   kdr                                                  

Lili Price RN                      RN   ss                                                   

Basia Sears am2                                                  

Hunter Mercado RN                    RN   jd3                                                  

Nicole Barnes, RN                    RN   vg1                                                  

                                                                                                  

Corrections: (The following items were deleted from the chart)                                    

13:21 13:08  / 96; Pulse 22bpm; Resp 67bpm; Pulse Ox 96% RA; vg1                        jd3 

14:19 14:07 BP 98 / 55; Pulse 68bpm; Resp 21bpm; Pulse Ox 97% RA; jd3                         jd3 

                                                                                                  

**************************************************************************************************

## 2022-11-11 NOTE — XMS REPORT
Continuity of Care Document

                          Created on:2022



Patient:SUZI SEARS

Sex:Female

:1955

External Reference #:[id number]





Demographics







                          Address                   [address]



                                                    [place] [address]

 

                          Home Phone                [phone]

 

                          Work Phone                [phone]

 

                          Mobile Phone              [phone]

 

                          Email Address             [email or web address]

 

                          Preferred Language        English

 

                          Marital Status            Unknown

 

                          Rastafarian Affiliation     Unknown

 

                          Race                      Unknown

 

                          Additional Race(s)        Unavailable



                                                    White

 

                          Ethnic Group              Unknown









Author







                          Organization              [facility]

t

 

                          Address                   [address]



                                                    [place] [address]

 

                          Phone                     [phone]









Support







                Name            Relationship    Address         Phone

 

                JEIMY SEARS SPOU            [address] [phone]



                                                [place] [address] 

 

                JEIMY SEARS SPOU            [address] [phone]



                                                [place] [address] 

 

                JEIMY SEARS EDUARDO SP              [address] [phone]

[phone]



                                                [place] [address] 

 

                JEIMY SEARS SPOU            [address]       [phone]

[phone]



                                                [place] [address] 

 

                CHITRA SEARS    SPOU            [address]       [phone]



                                                [place] [address] 

 

                Jeimy Sears Spouse          [phone]

[phone]



                                                [place] [address] 

 

                Jonah Sears   Child           Unavailable     [phone]









Care Team Providers







                    Name                Role                Phone

 

                    Peter WIGGINS, Charisse MARTINEZ Primary Care Physician [phone]

 

                    Clark Cuellar   Attending Clinician Unavailable

 

                    Miriam WIGGINS, Suresh Landry Attending Clinician [phone]

 

                    Lane WIGGINS, Zhen Steward Attending Clinician [phone]

[phone]

 

                    Faye Mitchell MD    Attending Clinician [phone]

 

                    Trinidad WIGGINS, Kumar Valadez Attending Clinician [phone]

[phone]

 

                    Adriel Sparks MD Attending Clinician [phone]

 

                    Vadim WIGGINS, Daylin   Attending Clinician [phone]

 

                    Jacobo WIGGINS, Quinton Gonsalez Attending Clinician [phone]

 

                    Rafat WIGGINS, Martina  Attending Clinician [phone]

 

                    Louann Odom MA Attending Clinician Unavailable

 

                    Venancio Barajas      Attending Clinician Unavailable

 

                    Koko         Attending Clinician Unavailable

 

                    Addison WIGGINS, Louann Carrizales Attending Clinician [phone]

[phone]

 

                    Aki Dupree MD      Attending Clinician [phone]

 

                    Luc WIGGINS, Shleby Attending Clinician [phone]

 

                    Fantasma Squires       Attending Clinician [phone]

 

                    Charisse Green  Attending Clinician [phone]

 

                    Maureen WIGGINS, Martha Chu Attending Clinician [phone]

[phone]

 

                    Jasper WIGGINS, Parveen LIN Attending Clinician [phone]

 

                    Shaikh ROSALIE, Rosalio     Attending Clinician [phone]

 

                    Christine Saini DO Attending Clinician [phone]

 

                    Edinson WIGGINS, Trev Hays Attending Clinician [phone]

 

                    Hasmukh WIGGINS, Mariluz Sanchez Attending Clinician [phone]

 

                    Suzie Dominguez       Attending Clinician Unavailable

 

                    Hadley Medina         Attending Clinician Unavailable

 

                    Antionette MAGALLANES, Joana      Attending Clinician Unavailable

 

                    Nieves WIGGINS, Melissa PHAM Attending Clinician [phone]

[phone]

 

                    Lazaro WIGGINS, Naseem Attending Clinician [phone]

 

                    Elaina Clark MD Attending Clinician [phone]

 

                    Brenna Jacobs MD Attending Clinician [phone]

 

                    [email or web address]      Attending Clinician Unavailable

 

                    MD MARTHA LAMAR Attending Clinician Unavailable

 

                    JULIANNA KEANE  Attending Clinician Unavailable

 

                    MAGY EARL     Attending Clinician Unavailable

 

                    MELVIN MANZO   Attending Clinician Unavailable

 

                    MD MELVIN MANZO Attending Clinician Unavailable

 

                    MD DAYLIN LITTLE Attending Clinician Unavailable

 

                    MD MELISSA PICKETT Attending Clinician Unavailable

 

                    BRENNA JACOBS    Attending Clinician Unavailable

 

                    Brenna Jacobs    Attending Clinician [phone]

 

                    Abdi Schulz Attending Clinician [phone]

 

                    Gabriela Rosenthal Attending Clinician [phone]

[phone]

 

                    EMILIA BONILLA        Attending Clinician Unavailable

 

                    DR GOPAL ADEN    Attending Clinician Unavailable

 

                    Yousif Rhodes Attending Clinician [phone]

 

                    Randy Cunningham   Attending Clinician [phone]

 

                    Yoan Lei     Attending Clinician [phone]

 

                    Abbi Somers  Attending Clinician [phone]

 

                    Vignesh See Attending Clinician [phone]

 

                    Charisse Green  Admitting Clinician Unavailable

 

                    ZHEN JENKINS     Admitting Clinician Unavailable

 

                    MARTINA DOUGLASS     Admitting Clinician Unavailable

 

                    Anthony Elkins     Admitting Clinician Unavailable

 

                    Koko         Admitting Clinician Unavailable

 

                    AKI DUPREE         Admitting Clinician Unavailable

 

                    CHRISTINE SAINI       Admitting Clinician Unavailable

 

                    MD ROSALIO ANGULO     Admitting Clinician Unavailable

 

                    MELISSA PICKETT     Admitting Clinician Unavailable

 

                    Susan      Admitting Clinician Unavailable

 

                    MD MARTHA LAMAR Admitting Clinician Unavailable

 

                    MELVIN MANZO   Admitting Clinician Unavailable

 

                    MD MELVIN MANZO Admitting Clinician Unavailable

 

                    DAYLIN LITTLE      Admitting Clinician Unavailable

 

                    MD KAUSHIK BECKHAM   Admitting Clinician Unavailable

 

                    MD MELISSA PICKETT Admitting Clinician Unavailable

 

                    DR GOPAL ADEN    Admitting Clinician Unavailable









Payers







           Payer Name Policy Type Policy Number Effective Date Expiration Date S ource MEDICARE [place]:            [id number] 2021            



           [employer]                       00:00:00              







Problems







       Condition Condition Condition Status Onset  Resolution Last   Treating Co

mments 

Source



       Name   Details Category        Date   Date   Treatment Clinician        



                                                 Date                 

 

       Chest pain Chest pain Disease Active 2022                             M

ethodi



       with high with high               05                               



       risk of risk of               00:00:                             [facility]



       acute  acute                00                                 l



       coronary coronary                                                  



       syndrome syndrome                                                  

 

       Hyperkalem Hyperkalem Disease Active                              M

ethodi



       ia     ia                   9                               st



                                   00:00:                             Hospita



                                   00                                 l

 

       Fluid  Fluid  Disease Active                              Methodi



       overload overload               



                                   00:00:                             Hospita



                                   00                                 l

 

       COVID-19 COVID-19 Disease Active                              [facility]

di



       virus  virus                                               st



       detected detected               00:00:                             Hospit

a



                                   00                                 l

 

       Acute back Acute back Disease Active                              M

ethodi



       pain   pain                                                st



                                   00:00:                             [facility]ospita



                                   00                                 l

 

       Sepsis Sepsis Disease Active                              Methodi



                                                                  st



                                   00:00:                             [facility]ospita



                                   00                                 l

 

       ARF (acute ARF (acute Disease Active                              M

ethodi



       renal  renal                



       failure) failure)               00:00:                             Hospit

a



                                   00                                 l

 

       Hypoxemia Hypoxemia Disease Active                              Met

hodi



                                                                  st



                                   00:00:                             Hospita



                                   00                                 l

 

       Pulmonary Pulmonary Disease Active                              Met

hodi



       edema  edema                                               st



                                   00:00:                             Hospita



                                   00                                 l

 

       UTI    UTI    Disease Active                              Methodi



       (urinary (urinary               



       tract  tract                00:00:                             [facility]osp[facility]



       infection) infection)               00                                 l

 

       Shortness Shortness Disease Active                              Met

hodi



       of breath of breath               



                                   00:00:                             Hospita



                                   00                                 l

 

       Dysuria Dysuria Disease Active                              Methodi



                                                                  st



                                   00:00:                             Hospita



                                   00                                 l

 

       Edema  Edema  Disease Active                              Methodi



                                                                  st



                                   00:00:                             Hospita



                                   00                                 l

 

       Hyperlipid Hyperlipid Disease Active                       Overview

: Methodi



       emia   emia                                         Formattin st



                                   00:00:                      g of this Hospita



                                   00                          note   l



                                                               might be 



                                                               different 



                                                               from the 



                                                               original. 



                                                               Data   



                                                               migrated 



                                                               from [employer] 



                                                               [employer] 



                                                               y on   



                                                               5/30/15.D 



                                                               nancie    



                                                               migrated 



                                                               from [employer] 



                                                               [employer] 



                                                               y on   



                                                               5/30/15.D 



                                                               nancie    



                                                               migrated 



                                                               from [employer] 



                                                               [employer] 



                                                               y on   



                                                               5/30/15.D 



                                                               nancie    



                                                               migrated 



                                                               from [employer] 



                                                               [employer] 



                                                               y on   



                                                               5/30/15.D 



                                                               nancie    



                                                               migrated 



                                                               from [employer] 



                                                               [employer] 



                                                               y on   



                                                               5/30/15. 

 

       Hyperparat Hyperparat Disease Active                              M

ethodi



       hyroidism hyroidism                                              st



                                   00:00:                             Hospita



                                   00                                 l

 

       Hyperurice Hyperurice Disease Active                       Overview

: Methodi



       keegan    keegan                                          Formattin st



                                   00:00:                      g of this Hospita



                                   00                          note   l



                                                               might be 



                                                               different 



                                                               from the 



                                                               original. 



                                                               Data   



                                                               migrated 



                                                               from [employer] 



                                                               [employer] 



                                                               y on   



                                                               5/30/15.D 



                                                               nancie    



                                                               migrated 



                                                               from [employer] 



                                                               [employer] 



                                                               y on   



                                                               5/30/15.D 



                                                               nancie    



                                                               migrated 



                                                               from [employer] 



                                                               [employer] 



                                                               y on   



                                                               5/30/15.D 



                                                               nancie    



                                                               migrated 



                                                               from [employer] 



                                                               [employer] 



                                                               y on   



                                                               5/30/15.D 



                                                               nancie    



                                                               migrated 



                                                               from [employer] 



                                                               [employer] 



                                                               y on   



                                                               5/30/15. 

 

       Abnormal Abnormal Disease Active                              [facility]

di



       urinalysis urinalysis               



                                   00:00:                             Hospita



                                   00                                 l

 

       Abnormal Abnormal Disease Active                              [facility]

di



       gait   gait                 



                                   00:00:                             [facility]ospita



                                   00                                 l

 

       Ankle  Ankle  Disease Active                              Methodi



       swelling swelling               



                                   00:00:                             [facility]ospita



                                   00                                 l

 

       Atrial Atrial Disease Active                              Methodi



       fibrillati fibrillati               



       on     on                   00:00:                             Hospita



                                   00                                 l

 

       Chronic Chronic Disease Active                              Methodi



       venous venous               



       stasis stasis               00:00:                             Hospita



                                   00                                 l

 

       Venous Venous Disease Active                              Methodi



       stasis stasis               



       ulcer of ulcer of               00:00:                             Hospit

a



       lower  lower                00                                 l



       extremity extremity                                                  

 

       Weight Weight Disease Active                              Methodi



       gain   gain                 



                                   00:00:                             Hospita



                                   00                                 l

 

       Pain in Pain in Disease Active                              Methodi



       wrist  wrist                



                                   00:00:                             Hospita



                                   00                                 l

 

       Prerenal Prerenal Disease Active                              [facility]

di



       azotemia azotemia               



                                   00:00:                             Hospita



                                   00                                 l

 

       Proteinuri Proteinuri Disease Active                              M

ethodi



       a      a                    



                                   00:00:                             Hospita



                                   00                                 l

 

       Peripheral Peripheral Disease Active                              M

ethodi



       venous venous               



       insufficie insufficie               00:00:                             Ho

spita



       ncy    ncy                  00                                 l

 

       Malignant Malignant Disease Active                              Met

hodi



       neoplasm neoplasm               



       of skin of of skin of               00:00:                             Ho

spita



       upper  upper                00                                 l



       extremity extremity                                                  

 

       Migraine Migraine Disease Active                              [facility]

di



       headache headache               



                                   00:00:                             Hospita



                                   00                                 l

 

       Diabetes Diabetes Disease Active                              [facility]

di



       mellitus mellitus               



                                   00:00:                             [facility]ospita



                                   00                                 l

 

       Abdominal Abdominal Disease Active                              Met

hodi



       pain   pain                 



                                   00:00:                             [facility]ospita



                                   00                                 l

 

       CHF    CHF    Disease Active                              Methodi



       (congestiv (congestiv                                              st



       e heart e heart               00:00:                             Hospita



       failure) failure)               00                                 l

 

       Flank pain Flank pain Disease Active                              M

ethodi



                                   6-18                               st



                                   00:00:                             Hospita



                                   00                                 l

 

       Renal  Renal  Disease Active                       Overview: Method

i



       stone  stone                [address]



                                   00:00:                      g of this Hospita



                                   00                          note   l



                                                               might be 



                                                               different 



                                                               from the 



                                                               original. 



                                                               Added  



                                                               automatic 



                                                               ally from 



                                                               request 



                                                               for    



                                                               surgery 



                                                               [id number] 

 

       SOLO     SOLO   Diagnosis Active 2021               [facility]

[facility]



              Active                         12:03:00               l



              2021               00:00:                             Donny

n



               [facility]               00                                 



              [place]                                                    

 

       Symptomati Symptomati Disease Active                       Overview

: Methodi



       c anemia c anemia               4-15                        Formattin st



                                   00:00:                      g of this [facility]osp[facility]



                                   00                          note   l



                                                               might be 



                                                               different 



                                                               from the 



                                                               original. 



                                                               Added  



                                                               automatic 



                                                               ally from 



                                                               request 



                                                               for    



                                                               surgery 



                                                               [id number] 

 

       Acute  Acute  Disease Active                              Methodi



       gallstone gallstone               3-12                               st



       pancreatit pancreatit               00:00:                             [facility]

spita



       is     is                   00                                 l

 

       Hypervolem Hypervolem Disease Active                              M

ethodi



       ia     ia                   2                               st



                                   00:00:                             Hospita



                                   00                                 l

 

       Hypotensio Hypotensio Disease Active 2020                             M

ethodi



       n      n                    2-21                               st



                                   00:00:                             [facility]osp[facility]



                                   00                                 l

 

       Respirator Respirator Disease Active 2020                             M

ethodi



       y failure y failure               1-16                               st



                                   00:00:                             [facility]ospita



                                   00                                 l

 

       Acute  Acute  Disease Active 2020                             Methodi



       cystitis cystitis               1-13                               st



       without without               00:00:                             [facility]ospita



       hematuria hematuria               00                                 l

 

       Class 3 Class 3 Disease Active 2020                             Methodi



       severe severe               0-26                               st



       obesity obesity               00:00:                             Hospita



       without without               00                                 l



       serious serious                                                  



       comorbidit comorbidit                                                  



       y with y with                                                  



       body mass body mass                                                  



       index  index                                                   



       (BMI) of (BMI) of                                                  



       60.0 to 60.0 to                                                  



       69.9 in 69.9 in                                                  



       adult  adult                                                   

 

       Acute  Acute  Disease Active 2020                             Methodi



       renal  renal                0-26                               st



       failure failure               00:00:                             [facility]ospita



       (ARF)  (ARF)                00                                 l

 

       Acute  Acute  Disease Active 2020                             Methodi



       respirator respirator               0-26                               st



       y failure y failure               00:00:                             Hosp

mimi



       with   with                 00                                 l



       hypoxia hypoxia                                                  



       and    and                                                     



       hypercapni hypercapni                                                  



       a      a                                                       

 

       Acute  Acute  Disease Active 2020                             Methodi



       congestive congestive               0-24                               st



       heart  heart                00:00:                             Hospita



       failure failure               00                                 l

 

       Left foot Left foot Disease Active 2019-1                             Met

hodi



       infection infection               0-15                               st



                                   00:00:                             Hospita



                                   00                                 l

 

       Cellulitis Cellulitis Disease Active -0                             M

ethodi



       of left of left               9-15                               st



       leg    leg                  00:00:                             Hospita



                                   00                                 l

 

       Bacteremia Bacteremia Disease Active 2019-                             M

ethodi



       due to due to                                              st



       Pseudomona Pseudomona               00:00:                             Toney montalvo



       s      s                    00                                 l

 

       COLON   COLON Diagnosis Active 2017               [facility]

[place]



       CANCER CANCER                         05:49:00               l



       SCREENING- SCREENING-               00:00:                             He

rmann



       Z12.11 Z12.11               00                                 



              Active                                                  



              2017                                                  



               [facility]                                                  



              [place]                                                    

 

       R92.1 -  R92.1 - Diagnosis Active 2016               

[facility]



       MAMMOGRAPH MAMMOGRAPH                         15:55:00               

l



       IC     IC                   00:01:                             [facility]



       CALCIFCN CALCIFCN               00                                 



       FOUND ON FOUND ON                                                  



              Active                                                  



              2016                                                  



               [facility]                                                  



              [place]                                                  

 

       Z12.31 -  Z12.31 - Diagnosis Active 2016             

  [facility]



       ENCNTR ENCNTR                         07:08:00               l



       SCREEN SCREEN               00:01:                             [facility]



       MAMMOGRAM MAMMOGRAM               00                                 



       FOR MA FOR MA                                                  



              Active                                                  



              2016                                                  



               [facility]                                                  



              [place]                                                  

 

       RT WRIST  RT WRIST Diagnosis Active 2017             

  [facility]



       DISTAL DISTAL               1-          02:03:00               l



       RADIUS RADIUS               09:00:                             [facility]



       CLSD FX CLSD FX               00                                 



              Active                                                  



              2016                                                  



               [facility]                                                  



              Bone &                                                  



              Joint                                                   

 

       Abnormal Abnormal Disease Active                       Overview: Me

thodi



       glucose glucose               7-02                        Formattin st



       level  level                00:00:                      g of this Hospita



                                   00                          note   l



                                                               might be 



                                                               different 



                                                               from the 



                                                               original. 



                                                               Data   



                                                               migrated 



                                                               from [employer] 



                                                               y on   



                                                               7/8/15.Da 



                                                               ta     



                                                               migrated 



                                                               from [employer] 



                                                               y on   



                                                               7/8/15.Da 



                                                               ta     



                                                               migrated 



                                                               from [employer] 



                                                               y on   



                                                               7/8/15. 

 

       Lymphedema Lymphedema Disease Active                       Overview

: Methodi



                                   2-04                        Formattin st



                                   00:00:                      g of this Hospita



                                   00                          note   l



                                                               might be 



                                                               different 



                                                               from the 



                                                               original. 



                                                               Data   



                                                               migrated 



                                                               from [employer] 



                                                               y on   



                                                               5/30/15. 

 

       Obstructiv Obstructiv Disease Active                       Overview

: Methodi



       e sleep e sleep               2-04                        Formattin st



       apnea  apnea                00:00:                      g of this Hospita



       syndrome syndrome               00                          note   l



                                                               might be 



                                                               different 



                                                               from the 



                                                               original. 



                                                               Data   



                                                               migrated 



                                                               from [employer] 



                                                               [employer] 



                                                               y on   



                                                               5/30/15.D 



                                                               nancie    



                                                               migrated 



                                                               from [employer] 



                                                               [employer] 



                                                               y on   



                                                               5/30/15.D 



                                                               nancie    



                                                               migrated 



                                                               from [employer] 



                                                               [employer] 



                                                               y on   



                                                               5/30/15.D 



                                                               nancie    



                                                               migrated 



                                                               from [employer] 



                                                               [employer] 



                                                               y on   



                                                               5/30/15.D 



                                                               nancie    



                                                               migrated 



                                                               from [employer] 



                                                               [employer] 



                                                               y on   



                                                               5/30/15.D 



                                                               nancie    



                                                               migrated 



                                                               from [employer] 



                                                               [employer] 



                                                               y on   



                                                               5/30/15. 

 

       Hypothyroi Hypothyroi Disease Active                       Overview

: Methodi



       dism   dism                                         Formattin st



                                   00:00:                      g of this Hospita



                                   00                          note   l



                                                               might be 



                                                               different 



                                                               from the 



                                                               original. 



                                                               Data   



                                                               migrated 



                                                               from [employer] 



                                                               [employer] 



                                                               y on   



                                                               5/30/15. 

 

       Depressive Depressive Disease Active                       Overview

: Methodi



       disorder disorder                                       Formattin st



                                   00:00:                      g of this Hospita



                                   00                          note   l



                                                               might be 



                                                               different 



                                                               from the 



                                                               original. 



                                                               Data   



                                                               migrated 



                                                               from [employer] 



                                                               [employer] 



                                                               y on   



                                                               5/30/15.D 



                                                               nancie    



                                                               migrated 



                                                               from [employer] 



                                                               [employer] 



                                                               y on   



                                                               5/30/15.D 



                                                               nancie    



                                                               migrated 



                                                               from [employer] 



                                                               [employer] 



                                                               y on   



                                                               5/30/15.D 



                                                               nancie    



                                                               migrated 



                                                               from [employer] 



                                                               [employer] 



                                                               y on   



                                                               5/30/15. 

 

       Obesity  Obesity Problem Active 2016               [facility]

moria



       (disorder) (disorder)               8          00:23:22               

l



              Active               00:00:                             [facility]



              2012               00                                 



              Problem                                                  



              2016                                                  



              Data                                                    



              migrated                                                  



              from [employer]                                                  



              [employer]                                                  



              on                                                      



              5/30/15.                                                  



               [facility]                                                  



              Tessa,                                                  



              [facility]                                                  



              [place],[facility]                                                  



              [place]                                                  



              Bone &                                                  



              Joint                                                   

 

       Cobalamin Cobalamin Disease Active                       Overview: 

Methodi



       deficiency deficiency                                       Formattin

 st



                                   00:00:                      g of this Hospita



                                   00                          note   l



                                                               might be 



                                                               different 



                                                               from the 



                                                               original. 



                                                               Data   



                                                               migrated 



                                                               from [employer] 



                                                               [employer] 



                                                               y on   



                                                               5/30/15.D 



                                                               nancie    



                                                               migrated 



                                                               from [employer] 



                                                               [employer] 



                                                               y on   



                                                               5/30/15.D 



                                                               nancie    



                                                               migrated 



                                                               from [employer] 



                                                               [employer] 



                                                               y on   



                                                               5/30/15.D 



                                                               nancie    



                                                               migrated 



                                                               from [employer] 



                                                               [employer] 



                                                               y on   



                                                               5/30/15. 

 

       Hypertensi  Hypertens Problem Active 2016            

   [facility]



       ve episode kyle                  7-10          00:23:22               l



       (disorder) episode               00:00:                             [facility]

nn



              (disorder)               00                                 



              Active                                                  



              07/10/2012                                                  



              Problem                                                  



              2016                                                  



              Data                                                    



              migrated                                                  



              from                                                   



              [employer]                                                  



              on                                                      



              5/30/15.                                                  



               [facility]                                                  



              [place],                                                  



              [facility]                                                  



              [place],                                                  



              [facility]                                                     



              [place]                                                  



              Trace,[facility]                                                  



              [place]                                                  



              Bone &                                                  



              Joint                                                   

 

       Kidney  Kidney Problem Active               2017               [place]

milan



       disease disease                             03:07:28               l



       (disorder) (disorder)                                                  He

rmann



              Active                                                  



              Problem                                                  



              2017                                                  



               [place]                                                  



              [place]                                                    

 

       RIGHT   RIGHT Diagnosis Active               2016               Mem

oria



       WRIST  WRIST                              15:06:00               l



       DISTAL DISTAL                                                  [place]



       RADIUS RADIUS                                                  



       CLSD FX CLSD FX                                                  



              Active                                                  



              [facility]                                                     



              [place]                                                  



              Bone &                                                  



              Joint                                                   

 

       DISTAL   DISTAL Diagnosis Active               2016               M

emoria



       RADIUS RADIUS                             09:46:00               l



       CLSD FX CLSD FX                                                  [place]



              Active                                                   



              [facility]                                                     



              [place]                                                  



              Trace                                                   

 

       Long-term  Long-term Problem Active               2022             

  [facility]



       current current                             03:11:55               l



       use of use of                                                  [place]



       drug   drug                                                    



       therapy therapy                                                  



       (situation (situation                                                  



       )      ) Active                                                  



              Problem                                                  



              2022                                                  



               Medical                                                  



              Group                                                   

 

       Cholestero  Cholester Problem                      2016            

   [facility]



       l      ol                                 05:02:18               l



       (substance (substance                                                  He

rmann



       )      )  Problem                                                  



              2016                                                  



              Surgical                                                  



              Specialty                                                  



              [facility]                                                    

 

       Sleep   Sleep Problem                      2016               Memor

ia



       apnea  apnea                              05:02:18               l



       (finding) (finding)                                                  [place]

daryl



              Problem                                                  



              2016                                                  



              <sup>2</matos                                                  



              p>does not                                                  



              use cpap                                                  



              Surgical                                                  



              [facility]                                                    

 

       Calcificat  Calcifica Problem Resolve               2022           

    [facility]



       ion of tion of        d                    03:11:55               l



       breast breast                                                  [place]



       (finding) (finding)                                                  



              Resolved                                                  



              Problem                                                  



              2022                                                  



              Left                                                   



              Medical                                                  



              Group,                                                  



              [facility]                                                    



              Tessa,                                                  



              [facility]                                                  



              Land,[facility]                                                     



              [place]                                                  



              Trace,[facility]                                                  



              [place]                                                  



              Bone &                                                  



              Joint,                                                  



              [place]                                                  



                                                                      

 

       Acute   Acute Problem Active               2020               Memor

ia



       urinary urinary                             22:36:35               l



       tract  tract                                                   [place]



       infection infection                                                  



       (disorder) (disorder)                                                  



               Active                                                  



              Problem                                                  



              2020                                                  



               Medical                                                  



              Group                                                   

 

       Chronic  Chronic Problem Active               2020               Me

moria



       renal  renal                              22:36:35               l



       impairment impairment                                                  He

rmann



       (disorder) (disorder)                                                  



              Active                                                  



              Problem                                                  



              2020                                                  



               Medical                                                  



              Group,                                                  



              [facility]                                                    



              Tessa,                                                  



              [facility]                                                  



              [place],                                                  



              [facility]                                                     



              [place]                                                  



              Trace,[facility]                                                  



              [place]                                                  



              Bone &                                                  



              Joint,                                                  



              [place]                                                  

 

       Benign  Benign Problem Active               2022               [place]

milan



       essential essential                             03:11:55               l



       hypertensi hypertensi                                                  He

rmann



       on     on                                                      



       (disorder) (disorder)                                                  



              Active                                                  



              Problem                                                  



              2022                                                  



               Medical                                                  



              Group,                                                  



              [facility]                                                    



              Tessa,                                                  



              [facility]                                                  



              [place],[facility]                                                     



              [place]                                                  



              Trace,[facility]                                                  



              [place]                                                  



              Bone &                                                  



              Joint,                                                  



              [place]                                                  

 

       Cardiorena  Cardioren Problem Active               2022            

   [facility]



       l syndrome al                                 03:11:55               l



       (disorder) syndrome                                                  [place]

daryl



              (disorder)                                                  



              Active                                                  



              Problem                                                  



              2022                                                  



                                                                    



              Medical                                                  



              Group,                                                  



              [facility]                                                  



              [place],                                                  



              [place]                                                  

 

       Chronic   Chronic Problem Active               2022               M

emoria



       kidney kidney                             03:11:55               l



       disease disease                                                  [place]



       (disorder) (disorder)                                                  



              Active                                                  



              Problem                                                  



              2022                                                  



                                                                    



              Medical                                                  



              Group,                                                  



              [facility]                                                  



              [place],                                                  



              [place]                                                  

 

       Chronic   Chronic Problem Active               2022               M

emoria



       kidney kidney                             03:11:55               l



       disease disease                                                  [place]



       stage 3 stage 3                                                  



       (disorder) (disorder)                                                  



              Active                                                  



              Problem                                                  



              2022                                                  



                                                                    



              Medical                                                  



              Group,                                                  



              [facility]                                                  



              [place],                                                  



              [place]                                                  

 

       Chronic   Chronic Problem Active               2022               M

emoria



       pain   pain                               03:11:55               l



       (finding) (finding)                                                  [place]

daryl



              Active                                                  



              Problem                                                  



              2022                                                  



                                                                    



              Medical                                                  



              Group,                                                  



              [facility]                                                  



              [place],                                                  



              [place]                                                  

 

       Dependence  Dependenc Problem Active               2022            

   [facility]



       on     e on                               03:11:55               l



       supplement supplement                                                  He

rmann



       al oxygen al oxygen                                                  



       (finding) (finding)                                                  



              Active                                                  



              Problem                                                  



              2022                                                  



                                                                    



              Medical                                                  



              Group,                                                  



              [place]                                                  

 

       Edema of  Edema of Problem Active               2022               

[facility]



       lower  lower                              03:11:55               l



       extremity extremity                                                  [place]

daryl



       (finding) (finding)                                                  



              Active                                                  



              Problem                                                  



              2022                                                  



               Medical                                                  



              Group,                                                  



              [facility]                                                  



              [place],                                                  



              [place]                                                  

 

       Hypertensi  Hypertens Problem Active               2022            

   [facility]



       ve     kyle                                03:11:55               l



       disorder, disorder,                                                  [place]

daryl



       systemic systemic                                                  



       arterial arterial                                                  



       (disorder) (disorder)                                                  



              Active                                                  



              Problem                                                  



              2022                                                  



                                                                    



              Medical                                                  



              Group,[facility]                                                  



              ical                                                    



              Specialty                                                  



              Hospital                                                  



              [facility] [place]                                                  



              [place],                                                  



              [facility]                                                  



              [place],                                                  



              [place]                                                  

 

       Hypertensi  Hypertens Problem Active               2022            

   [facility]



       ve renal kyle renal                             03:11:55               l



       disease disease                                                  [place]



       (disorder) (disorder)                                                  



              Active                                                  



              Problem                                                  



              2022                                                  



               Medical                                                  



              Group,                                                  



              [facility]                                                  



              [place],                                                  



              [place]                                                  

 

       Lymphedema  Lymphedem Problem Active               2022            

   [facility]



       of lower a of lower                             03:11:55               l



       extremity extremity                                                  [place]

daryl



       (disorder) (disorder)                                                  



              Active                                                  



              Problem                                                  



              2022                                                  



               Medical                                                  



              Group,                                                  



              [facility]                                                  



              [place],                                                  



              [place]                                                  

 

       Morbid  Morbid Problem Active               2022               [place]

milan



       obesity obesity                             03:11:55               l



       (disorder) (disorder)                                                  He

rmann



              Active                                                  



              Problem                                                  



              2022                                                  



                                                                    



              Medical                                                  



              Group,                                                  



              ILA Zarate,                                                  



              [facility]                                                  



              [place],[facility]                                                     



              [place]                                                  



              Trace,[facility]                                                  



              [place]                                                  



              Bone &                                                  



              Joint,                                                  



              [place]                                                  

 

       Post-disch  Post-disc Problem Active               2022            

   [facility]



       arge   harge                              03:11:55               l



       follow-up follow-up                                                  [facility]bra[facility] brito



       (finding) (finding)                                                  



              Active                                                  



              Problem                                                  



              2022                                                  



               Medical                                                  



              Group,                                                  



              [place]                                                  

 

       Stasis  Stasis Problem Active               2022               [place]

milan



       ulcer  ulcer                              03:11:55               l



       (disorder) (disorder)                                                  He

rmann



              Active                                                  



              Problem                                                  



              2022                                                  



                                                                    



              Medical                                                  



              Group,                                                  



              [facility]                                                  



              [place],                                                  



              [place]                                                  

 

       Swelling -  Swelling Problem Active               2022             

  [facility]



       edema - - edema -                             03:11:55               l



       symptom symptom                                                  [place]



       (finding) (finding)                                                  



              Active                                                  



              Problem                                                  



              2022                                                  



                                                                    



              Medical                                                  



              Group,                                                  



              [facility]                                                  



              [place],                                                  



              [place]                                                  

 

       Acute    Acute Problem Resolve 2016              

 [facility]



       otitis otitis        d      4-24   00:23:22 00:23:22               l



       media  media                00:00:                             [place]



       (disorder) (disorder)               00                                 



              Resolved                                                  



              2013                                                  



              Problem                                                  



              2016                                                  



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           



     per tablet                                         days           



                                                  .acute           



                                                  pain.           

 

     methocarbam      2022- No             500mg Q.25D Take 500          

 [facility]



     oL        -                          mg by           st



     (ROBAXIN)      18:56: 00:00                          mouth 4           [facility]

[facility]



     500 MG      38   :00                           (four)           l



     tablet                                         times a           



                                                  day.           

 

     apixaban      2022- No             2.5mg Q.5D Take 2.5           [facility]

[facility]



     (ELIQUIS) 5                                mg by           st



     mg tablet      18:51: 00:00                          mouth 2           [facility]

[facility]



               03   :00                           (two)           l



                                                  times a           



                                                  day.           

 

     sevelamer      2022- No             1600mg [id number] Take 1,600    

       [facility]



     (RENVELA)                           [id number] mg by           st



     800 mg      18:50: 00:00                     3D   mouth 3           [facility]osp[facility]



     tablet      58   :00                           (three)           l



                                                  times a           



                                                  day with           



                                                  meals.           

 

     oxyCODONE      2022- No        [id number] 5mg  Q4H  Take 5 mg           M

[facility]



     (ROXICODONE                                by mouth           st



     ) 5 MG      18:50: 00:00                          every 4           [facility]osp[facility]



     immediate      30   :00                           (four)           l



     release                                         hours as           



     tablet                                         needed for           



                                                  moderate           



                                                  pain           



                                                  .acute           



                                                  pain.           

 

     tiZANidine      0      Yes            4mg  Q.5D Take 1           [facility]

[facility]



     (ZANAFLEX)      -01                               tablet (4           st



     4 MG tablet      00:00:                               mg total)           H

ospita



               00                                 by mouth 2           l



                                                  (two)           



                                                  times a           



                                                  day as           



                                                  needed for           



                                                  muscle           



                                                  spasms.           

 

     promethazin      -0      Yes            25mg Q6H  Take 1           [facility]

[facility]



     e         8-01                               tablet (25           st



     (PHENERGAN)      00:00:                               mg total)           H

ospita



     25 MG      00                                 by mouth           l



     tablet                                         every 6           



                                                  (six)           



                                                  hours as           



                                                  needed for           



                                                  vomiting           



                                                  or nausea.           

 

     promethazin      -0      Yes            50mg [id number] Take 1         

  [facility]



     e         7-28                          [id number] tablet (50           st



     (PHENERGAN)      00:00:                          3D   mg total)           H

ospita



     50 MG      00                                 by mouth 3           l



     tablet                                         (three)           



                                                  times a           



                                                  day as           



                                                  needed for           



                                                  nausea or           



                                                  vomiting.           

 

     diclofenac            Yes            75mg Q.5D Take 1           [facility]

[facility]



     (VOLTAREN)      7-26                               tablet (75           st



     75 MG EC      00:00:                               mg total)           [facility]

[facility]



     tablet      00                                 by mouth 2           l



                                                  (two)           



                                                  times a           



                                                  day.           

 

     fluconazole            Yes            150mg Q7D  Take 1           [facility]

[facility]



     (DIFLUCAN)      7-25                               tablet           st



     150 MG      00:00:                               (150 mg           [facility]osp[facility]



     tablet      00                                 total) by           l



                                                  mouth once           



                                                  a week. On           



                                                  Monday           

 

     DULoxetine      2022- No             60mg QD   Take 60 mg           

[facility]



     (CYMBALTA)                                by mouth           st



     60 MG      18:01: 00:00                          daily.           [facility]



     capsule      54   :00                                          l

 

     gabapentin      2022- No             300mg Q.5D Take 300           M

[facility]



     (NEURONTIN)      -09                          mg by           st



     100 mg      18:01: 00:00                          mouth 2           [facility]



     capsule      54   :00                           (two)           l



                                                  times a           



                                                  day.           

 

     allopurinoL      2022- No             100mg QD   Take 100           

[facility]



     (ZYLOPRIM)      -09                          mg by           st



     100 MG      18:01: 00:00                          mouth           [facility]



     tablet      54   :00                           daily.           l

 

     Zithromax            Yes                      See            [facility]



     Z-Mata 250      6-29                               Instructio           l



     mg oral      21:14:                               ns, Take 2           [facility]

[facility]



     tablet      00                                 tablets by           



                                                  mouth the           



                                                  first day           



                                                  then 1           



                                                  tablet by           



                                                  mouth days           



                                                  2-5. (Pt           



                                                  is on           



                                                  hemodialys           



                                                  is)., X 5           



                                                  day, # 6           



                                                  tab, 0           



                                                  Refill(s),           



                                                  Pharmacy:           



                                                  [facility],           



                                                  170.18,           



                                                  cm,            



                                                  22           



                                                  14:52:00           



                                                  CDT,           



                                                  Height,           



                                                  106.818,           



                                                  kg,            



                                                  22           



                                                  14...           

 

     Zithromax            Yes                      See            [facility]



     Z-Mata 250      6-29                               Instructio           l



     mg oral      21:14:                               ns, Take 2           [facility]

d[facility]



     tablet      00                                 tablets by           



                                                  mouth the           



                                                  first day           



                                                  then 1           



                                                  tablet by           



                                                  mouth days           



                                                  2-5. (Pt           



                                                  is on           



                                                  hemodialys           



                                                  is)., X 5           



                                                  day, # 6           



                                                  tab, 0           



                                                  Refill(s),           



                                                  Pharmacy:           



                                                  [facility]            



                                                  Pharmacy           



                                                  [place],           



                                                  170.18,           



                                                  cm,            



                                                  22           



                                                  14:52:00           



                                                  CDT,           



                                                  Height,           



                                                  106.818,           



                                                  kg,            



                                                  22           



                                                  14...           

 

     Zithromax      2-0      Yes                      See            [facility]



     Z-Mata 250      6-29                               Instructio           l



     mg oral      21:14:                               ns, Take 2           [facility]

d[facility]



     tablet      00                                 tablets by           



                                                  mouth the           



                                                  first day           



                                                  then 1           



                                                  tablet by           



                                                  mouth days           



                                                  2-5. (Pt           



                                                  is on           



                                                  hemodialys           



                                                  is)., X 5           



                                                  day, # 6           



                                                  tab, 0           



                                                  Refill(s),           



                                                  Pharmacy:           



                                                  [facility]            



                                                  Pharmacy           



                                                  [place],           



                                                  170.18,           



                                                  cm,            



                                                  22           



                                                  14:52:00           



                                                  CDT,           



                                                  Height,           



                                                  106.818,           



                                                  kg,            



                                                  22           



                                                  14...           

 

     Velphoro      2022-0      Yes                      500 mg,           [facility]

a



               6-29                               CHEW,           l



               20:13:                               Daily,           [facility]



               00                                 take with           



                                                  food, 0           



                                                  Refill(s)           

 

     Velphoro      2022-0      Yes                      500 mg,           [facility]

a



               6-29                               CHEW,           l



               20:13:                               Daily,           [facility]



               00                                 take with           



                                                  food, 0           



                                                  Refill(s)           

 

     Velphoro      2022-0      Yes                      500 mg,           [facility]

a



               6-29                               CHEW,           l



               20:13:                               Daily,           [place]



               00                                 take with           



                                                  food, 0           



                                                  Refill(s)           

 

     sevelamer      2-0      Yes                      2,400 mg =           [facility]

[facility]



     carbonate      6-29                               3 tab, PO,           l



     800 mg oral      20:12:                               TID-Meals,           

[facility]



     tablet      00                                 # 270 tab,           



                                                  0              



                                                  Refill(s)           

 

     sevelamer      2022-0      Yes                      2,400 mg =           [facility]

[facility]



     carbonate      6-29                               3 tab, PO,           l



     800 mg oral      20:12:                               TID-Meals,           

[facility]



     tablet      00                                 # 270 tab,           



                                                  0              



                                                  Refill(s)           

 

     sevelamer      2022-0      Yes                      2,400 mg =           [facility]

[facility]



     carbonate      6-29                               3 tab, PO,           l



     800 mg oral      20:12:                               TID-Meals,           

[facility]



     tablet      00                                 # 270 tab,           



                                                  0              



                                                  Refill(s)           

 

     pantoprazol      2-0      Yes                      40 mg = 1           [facility]

[facility]



     e 40 mg      6-29                               tab, PO,           l



     oral      20:11:                               Daily, 0           [place]



     enteric      00                                 Refill(s)           



     coated                                                        



     tablet                                                        

 

     pantoprazol      2022-0      Yes                      40 mg = 1           [facility]

[facility]



     e 40 mg      6-29                               tab, PO,           l



     oral      20:11:                               Daily, 0           [place]



     enteric      00                                 Refill(s)           



     coated                                                        



     tablet                                                        

 

     pantoprazol      -0      Yes                      40 mg = 1           [facility]

[facility]



     e 40 mg      6-29                               tab, PO,           l



     oral      20:11:                               Daily, 0           [place]



     enteric      00                                 Refill(s)           



     coated                                                        



     tablet                                                        

 

     oxyCODONE 5      0      Yes                      5 mg = 1           [facility]

[facility]



     mg oral      6-29                               tab, PO,           l



     tablet,      20:10:                               Q4H, PRN           [facility]

n



     immediate      00                                 Pain, prn,           



     release                                         0              



                                                  Refill(s)           

 

     oxyCODONE 5      -0      Yes                      5 mg = 1           [facility]

[facility]



     mg oral      6-29                               tab, PO,           l



     tablet,      20:10:                               Q4H, PRN           [facility]

n



     immediate      00                                 Pain, prn,           



     release                                         0              



                                                  Refill(s)           

 

     oxyCODONE 5      -0      Yes                      5 mg = 1           [facility]

[facility]



     mg oral      6-29                               tab, PO,           l



     tablet,      20:10:                               Q4H, PRN           [facility]

n



     immediate      00                                 Pain, prn,           



     release                                         0              



                                                  Refill(s)           

 

     ondansetron      -0      Yes                      4 mg = 1           [facility]

[facility]



     4 mg oral      6-29                               tab, PO,           l



     tablet,      20:09:                               TID, PRN           [facility]

n



     disintegrat      00                                 Nausea /           



     ing                                          Vomiting,           



                                                  Dissolve           



                                                  tab under           



                                                  tongue, 0           



                                                  Refill(s)           

 

     ondansetron      -0      Yes                      4 mg = 1           [facility]

[facility]



     4 mg oral      6-29                               tab, PO,           l



     tablet,      20:09:                               TID, PRN           [facility]

n



     disintegrat      00                                 Nausea /           



     ing                                          Vomiting,           



                                                  Dissolve           



                                                  tab under           



                                                  tongue, 0           



                                                  Refill(s)           

 

     ondansetron      -0      Yes                      4 mg = 1           [facility]

[facility]



     4 mg oral      6-29                               tab, PO,           l



     tablet,      20:09:                               TID, PRN           [facility]

n



     disintegrat      00                                 Nausea /           



     ing                                          Vomiting,           



                                                  Dissolve           



                                                  tab under           



                                                  tongue, 0           



                                                  Refill(s)           

 

     Nitazoxanid      -0      Yes                      500 mg = 1           

[facility]



     e 500 mg      6-29                               tab, PO,           l



     oral tablet      20:08:                               Q12H, 0           [facility]

[facility]



               00                                 Refill(s)           

 

     Nitazoxanid      -0      Yes                      500 mg = 1           

[facility]



     e 500 mg      6-29                               tab, PO,           l



     oral tablet      20:08:                               Q12H, 0           [facility]

[place]



               00                                 Refill(s)           

 

     Nitazoxanid      -0      Yes                      500 mg = 1           

[facility]



     e 500 mg      6-29                               tab, PO,           l



     oral tablet      20:08:                               Q12H, 0           [facility]

[place]



               00                                 Refill(s)           

 

     metoprolol      -0      Yes                      25 mg = 1           [facility]

[facility]



     tartrate 25      6-29                               tab, PO,           l



     mg oral      20:07:                               BID, 0           [facility]



     tablet      00                                 Refill(s)           

 

     metoprolol      -0      Yes                      25 mg = 1           [facility]

[facility]



     tartrate 25      6-29                               tab, PO,           l



     mg oral      20:07:                               BID, 0           [place]



     tablet      00                                 Refill(s)           

 

     metoprolol      -0      Yes                      25 mg = 1           [facility]

[facility]



     tartrate 25      6-29                               tab, PO,           l



     mg oral      20:07:                               BID, 0           [place]



     tablet      00                                 Refill(s)           

 

     methocarbam      -0      Yes                      250 mg =           [facility]

[facility]



     ol 500 mg      6-29                               0.5 tab,           l



     oral tablet      20:05:                               PO, TID, 0           

[facility]



               00                                 Refill(s)           

 

     methocarbam      -0      Yes                      250 mg =           [facility]

[facility]



     ol 500 mg      6-29                               0.5 tab,           l



     oral tablet      20:05:                               PO, TID, 0           

[facility]



               00                                 Refill(s)           

 

     methocarbam      -0      Yes                      250 mg =           [facility]

[facility]



     ol 500 mg      6-29                               0.5 tab,           l



     oral tablet      20:05:                               PO, TID, 0           

[facility]



               00                                 Refill(s)           

 

     Alphagan P      -0      Yes                      1 drp,           [facility]

[facility]



     0.1%      6-29                               OPTH, Q12H           l



     ophthalmic      20:04:                                              [place]



     solution      00                                                

 

     folic acid      -0      Yes                      1 mg, PO,           [facility]

[facility]



               6-29                               Daily, 0           l



               20:04:                               Refill(s)           [facility]



                                                               

 

     Alphagan P      -0      Yes                      1 drp,           [facility]

[facility]



     0.1%      6-29                               OPTH, Q12H           l



     ophthalmic      20:04:                                              [place]



     solution      00                                                

 

     folic acid      -0      Yes                      1 mg, PO,           [facility]

[facility]



               6-29                               Daily, 0           l



               20:04:                               Refill(s)           [facility]



                                                               

 

     Alphagan P      -0      Yes                      1 drp,           [facility]

[facility]



     0.1%                                     OPTH, Q12H           l



     ophthalmic      20:04:                                              [place]



     solution      00                                                

 

     folic acid            Yes                      1 mg, PO,           [facility]

[facility]



                                              Daily, 0           l



               20:04:                               Refill(s)           [place]



               00                                                

 

     Oxygen            Yes                      1 btl,           [facility]



               -                               MISC,           l



               20:03:                               Daily, 2           [facility]



               00                                 liters, 0           



                                                  Refill(s)           

 

     Oxygen            Yes                      1 btl,           [facility]



               -                               MISC,           l



               20:03:                               Daily, 2           [place]



               00                                 liters, 0           



                                                  Refill(s)           

 

     Oxygen            Yes                      1 btl,           [facility]



               -                               MISC,           l



               20:03:                               Daily, 2           [place]



               00                                 liters, 0           



                                                  Refill(s)           

 

     Eliquis 2.5            Yes                      2.5 mg,           [facility]

[facility]



     mg oral      6-                               PO, Q12H,           l



     tablet      20:02:                               tab, 0           [place]



               00                                 Refill(s)           

 

     Eliquis 2.5      0      Yes                      2.5 mg,           [facility]

[facility]



     mg oral      6-29                               PO, Q12H,           l



     tablet      20:02:                               tab, 0           [place]



               00                                 Refill(s)           

 

     Eliquis 2.5      0      Yes                      2.5 mg,           [facility]

[facility]



     mg oral      6-29                               PO, Q12H,           l



     tablet      20:02:                               tab, 0           [facility]



               00                                 Refill(s)           

 

     doxycycline      2022- No             200mg Q.5D Take 200           

[facility]



     (VIBRAMYCIN      -                          mg by           



     ) 100 MG      13:07: 00:00                          mouth 2           [facility]

ta



     capsule      01   :00                           (two)           l



                                                  times a           



                                                  day. Take           



                                                  200mg (x2           



                                                  100mg           



                                                  capsules).           



                                                  Per            



                                                  patient           



                                                  started           



                                                  taking           



                                                  2022           

 

     glucosam/ch      2022- No             1{tbl} Q.5D Take 1           [facility]

[facility]



     on-msm1/C/m                                tablet by           



     ang/evelia      13:07: 00:00                          mouth 2           [facility]

ta



     (OSTEO      01   :00                           (two)           l



     BI-FLEX                                         times a           



     TRIPLE                                         day.           



     STRENGTH                                                        



     ORAL)                                                        

 

     NON       2022- No             1{capsu QD   Take 1           [facility]



     FORMULARY      6-26 06-25                le}       capsule by           st



               13:07: 00:00                          mouth           [facility]



               01   :00                           nightly.           l



                                                  Patient           



                                                  takes           



                                                  young           



                                                  living           



                                                  probiotic           



                                                  with 17           



                                                  billion           



                                                  active           



                                                  cultures           

 

     folic acid      2022- No             1mg  QD   Take 1           [facility]

[facility]



     (FOLVITE) 1                                tablet (1           st



     MG tablet      00:00: 04:59                          mg total)           [facility]

[facility]



               00   :00                           by mouth           l



                                                  daily for           



                                                  30 days.           

 

     methocarbam      2022- No             250mg [id number] Take 0.5     

      [facility]



     oL                             [id number] tablets           st



     (ROBAXIN)      00:00: 04:59                     3D   (250 mg           [facility]

[facility]



     500 MG      00   :00                           total) by           l



     tablet                                         mouth 3           



                                                  (three)           



                                                  times a           



                                                  day for 7           



                                                  days.           

 

     nitazoxanid      2022- No             500mg Q.5D Take 1           [facility]

[facility]



     e (ALINIA)                                tablet           st



     500 MG      00:00: 04:59                          (500 mg           [facility]



     tablet      00   :00                           total) by           l



                                                  mouth 2           



                                                  (two)           



                                                  times a           



                                                  day for 7           



                                                  days.           

 

     oxyCODONE      2022- No        [id number] 5mg  Q4H  Take 1           [facility]

[facility]



     (ROXICODONE                                tablet (5           st



     ) 5 MG      00:00: 04:59                          mg total)           [facility]

ta



     immediate      00   :00                           by mouth           l



     release                                         every 4           



     tablet                                         (four)           



                                                  hours as           



                                                  needed for           



                                                  severe           



                                                  pain for           



                                                  up to 20           



                                                  doses           



                                                  .acute           



                                                  pain. Max           



                                                  Daily           



                                                  Amount: 30           



                                                  mg             

 

     tizanidine            Yes                      4 mg = 1           [facility]

[facility]



     4 mg oral      5-16                               tab, PO,           l



     tablet      18:09:                               Bedtime,           [place]



               00                                 PRN AS           



                                                  NEEDED FOR           



                                                  MUSCLE           



                                                  SPASM, #           



                                                  15 tab, 0           



                                                  Refill(s),           



                                                  Pharmacy:           



                                                  [facility]           



                                                  #[id number],           



                                                  165.1, cm,           



                                                  21           



                                                  15:23:00           



                                                  CST,           



                                                  Height,           



                                                  107.301,           



                                                  kg,            



                                                  21           



                                                  15:23:00           



                                                  CST,           



                                                  Weight           

 

     tizanidine      -0      Yes                      4 mg = 1           [facility]

[facility]



     4 mg oral      5-16                               tab, PO,           l



     tablet      18:09:                               Bedtime,           [place]



               00                                 PRN AS           



                                                  NEEDED FOR           



                                                  MUSCLE           



                                                  SPASM, #           



                                                  15 tab, 0           



                                                  Refill(s),           



                                                  Pharmacy:           



                                                  [facility]           



                                                  #[id number],           



                                                  165.1, cm,           



                                                  21           



                                                  15:23:00           



                                                  CST,           



                                                  Height,           



                                                  107.301,           



                                                  kg,            



                                                  21           



                                                  15:23:00           



                                                  CST,           



                                                  Weight           

 

     tizanidine            Yes                      4 mg = 1           [facility]

[facility]



     4 mg oral      5-16                               tab, PO,           l



     tablet      18:09:                               Bedtime,           [facility]



               00                                 PRN AS           



                                                  NEEDED FOR           



                                                  MUSCLE           



                                                  SPASM, #           



                                                  15 tab, 0           



                                                  Refill(s),           



                                                  Pharmacy:           



                                                  [facility]           



                                                  [facility]           



                                                  #[id number],           



                                                  165.1, cm,           



                                                  21           



                                                  15:23:00           



                                                  CST,           



                                                  Height,           



                                                  107.301,           



                                                  kg,            



                                                  21           



                                                  15:23:00           



                                                  CST,           



                                                  Weight           

 

     nitrofurant      2022- No             100mg Q.5D Take 1           [facility]

[facility]



     oin,      3-11 03-15                          capsule           st



     macrocrysta      00:00: 04:59                          (100 mg           [facility]

[facility]



     l-monohydra      00   :00                           total) by           l



     te,                                          mouth 2           



     (MACROBID)                                         (two)           



     100 MG                                         times a           



     capsule                                         day for 3           



                                                  days.           

 

     lidocaine      2022- No             1{patch Q24H Place 1           [facility]

[facility]



     (LIDODERM)      3-09 04-09                }         patch on           st



     5 %       00:00: 04:59                          the skin           [facility]



               00   :00                           in the           l



                                                  morning           



                                                  for 30           



                                                  days.           



                                                  Remove &           



                                                  Discard           



                                                  patch           



                                                  within 12           



                                                  hours or           



                                                  as             



                                                  directed           



                                                  by MD.           

 

     traMADoL      2022- No        [id number] 50mg Q.[id number] Take 1          

 [facility]



     (ULTRAM) 50      3-09 03-20                     [id number] tablet (50      

     st



     mg tablet      00:00: 04:59                     3D   mg total)           [facility]

[facility]



               00   :00                           by mouth           l



                                                  as needed           



                                                  in the           



                                                  morning           



                                                  and 1           



                                                  tablet (50           



                                                  mg total)           



                                                  as needed           



                                                  at noon           



                                                  and 1           



                                                  tablet (50           



                                                  mg total)           



                                                  as needed           



                                                  in the           



                                                  evening           



                                                  for            



                                                  moderate           



                                                  pain. Do           



                                                  all this           



                                                  for up to           



                                                  10             



                                                  days.acute           



                                                  pain.           

 

     metoprolol            Yes            25mg Q.5D Take 25 mg           [facility]

[facility]



     tartrate      2-21                               by mouth 2           st



     (LOPRESSOR)      00:00:                               (two)           [facility]

ta



     25 mg      00                                 times a           l



     tablet                                         day.           

 

     Ondansetron            Yes                      4 mg = 1           [facility]

[facility]



     4 MG Oral      1-07                               tab, PO,           l



     Tablet      22:01:                               BID, X 5           [facility]



     [Zofran]                                       day, # 10           



                                                  tab, 0           



                                                  Refill(s),           



                                                  Pharmacy:           



                                                  [facility]           



                                                  [facility]           



                                                  #[id number],           



                                                  165.1, cm,           



                                                  21           



                                                  15:23:00           



                                                  CST,           



                                                  Height,           



                                                  107.301,           



                                                  kg,            



                                                  21           



                                                  15:23:00           



                                                  CST,           



                                                  Weight           

 

     Ondansetron      2-0      Yes                      4 mg = 1           [facility]

[facility]



     4 MG Oral      1-07                               tab, PO,           l



     Tablet      22:01:                               BID, X 5           [facility]



     [Zofran]                                       day, # 10           



                                                  tab, 0           



                                                  Refill(s),           



                                                  Pharmacy:           



                                                  [facility]           



                                                  [facility]           



                                                  #[id number],           



                                                  165.1, cm,           



                                                  21           



                                                  15:23:00           



                                                  CST,           



                                                  Height,           



                                                  107.301,           



                                                  kg,            



                                                  21           



                                                  15:23:00           



                                                  CST,           



                                                  Weight           

 

     Ondansetron      -0      Yes                      4 mg = 1           [facility]

[facility]



     4 MG Oral      1-07                               tab, PO,           l



     Tablet      22:01:                               BID, X 5           [facility]



     [Zofran]                                       day, # 10           



                                                  tab, 0           



                                                  Refill(s),           



                                                  Pharmacy:           



                                                  [facility]           



                                                  [facility]           



                                                  #[id number],           



                                                  165.1, cm,           



                                                  21           



                                                  15:23:00           



                                                  CST,           



                                                  Height,           



                                                  107.301,           



                                                  kg,            



                                                  21           



                                                  15:23:00           



                                                  CST,           



                                                  Weight           

 

     atorvastati      2021      Yes                      10 mg = 1           [facility]

[facility]



     n 10 mg      2-27                               tab, PO,           l



     oral tablet      21:45:                               Bedtime, #           

[facility]



               00                                 90 tab, 0           



                                                  Refill(s),           



                                                  Pharmacy:           



                                                  [facility]           



                                                  [facility]           



                                                  #[id number],           



                                                  165.1, cm,           



                                                  21           



                                                  15:23:00           



                                                  CST,           



                                                  Height,           



                                                  107.301,           



                                                  kg,            



                                                  21           



                                                  15:23:00           



                                                  CST,           



                                                  Weight           

 

     atorvastati      2021      Yes                      10 mg = 1           M

[facility]



     n 10 mg      2-27                               tab, PO,           l



     oral tablet      21:45:                               Bedtime, #           

[place]



               00                                 90 tab, 0           



                                                  Refill(s),           



                                                  Pharmacy:           



                                                  [facility]           



                                                  [facility]           



                                                  #[id number],           



                                                  165.1, cm,           



                                                  21           



                                                  15:23:00           



                                                  CST,           



                                                  Height,           



                                                  107.301,           



                                                  kg,            



                                                  21           



                                                  15:23:00           



                                                  CST,           



                                                  Weight           

 

     atorvastati      2021      Yes                      10 mg = 1           M

[facility]



     n 10 mg      2-27                               tab, PO,           l



     oral tablet      21:45:                               Bedtime, #           

[place]



               00                                 90 tab, 0           



                                                  Refill(s),           



                                                  Pharmacy:           



                                                  [facility]           



                                                  #[id number],           



                                                  165.1, cm,           



                                                  21           



                                                  15:23:00           



                                                  CST,           



                                                  Height,           



                                                  107.301,           



                                                  kg,            



                                                  21           



                                                  15:23:00           



                                                  CST,           



                                                  Weight           

 

     tizanidine      2021      Yes                      4 mg = 1           [facility]

[facility]



     4 mg oral      2-27                               tab, PO,           l



     tablet      21:40:                               Bedtime,           [place]



               00                                 PRN for           



                                                  muscle           



                                                  spasm, #           



                                                  30 tab, 0           



                                                  Refill(s),           



                                                  Pharmacy:           



                                                  [facility]           



                                                  #[id number],           



                                                  165.1, cm,           



                                                  21           



                                                  15:23:00           



                                                  CST,           



                                                  Height,           



                                                  107.301,           



                                                  kg,            



                                                  21           



                                                  15:23:00           



                                                  CST,           



                                                  Weight           

 

     Acetaminoph      2021      Yes                      1 tab, PO,           

[facility]



     en 325 MG /      2-27                               Q12H, PRN           l



     tramadol      21:40:                               for pain,           [facility]

[facility]



     hydrochlori      00                                 X 15 day,           



     de 37.5 MG                                         # 30 tab,           



     Oral Tablet                                         0              



     [Ultracet]                                         Refill(s),           



                                                  Pharmacy:           



                                                  [facility]           



                                                  #[id number],           



                                                  165.1, cm,           



                                                  21           



                                                  15:23:00           



                                                  CST,           



                                                  Height,           



                                                  107.301,           



                                                  kg,            



                                                  21           



                                                  15:23:00           



                                                  CST,           



                                                  Weight           

 

     tizanidine      2021      Yes                      4 mg = 1           [facility]

[facility]



     4 mg oral      2-27                               tab, PO,           l



     tablet      21:40:                               Bedtime,           [place]



               00                                 PRN for           



                                                  muscle           



                                                  spasm, #           



                                                  30 tab, 0           



                                                  Refill(s),           



                                                  Pharmacy:           



                                                  [facility]           



                                                  #[id number],           



                                                  165.1, cm,           



                                                  21           



                                                  15:23:00           



                                                  CST,           



                                                  Height,           



                                                  107.301,           



                                                  kg,            



                                                  21           



                                                  15:23:00           



                                                  CST,           



                                                  Weight           

 

     Acetaminoph      2021      Yes                      1 tab, PO,           

[facility]



     en 325 MG /      2-27                               Q12H, PRN           l



     tramadol      21:40:                               for pain,           [facility]

[facility]



     hydrochlori      00                                 X 15 day,           



     de 37.5 MG                                         # 30 tab,           



     Oral Tablet                                         0              



     [Ultracet]                                         Refill(s),           



                                                  Pharmacy:           



                                                  [facility]           



                                                  [facility]           



                                                  #[id number],           



                                                  165.1, cm,           



                                                  21           



                                                  15:23:00           



                                                  CST,           



                                                  Height,           



                                                  107.301,           



                                                  kg,            



                                                  21           



                                                  15:23:00           



                                                  CST,           



                                                  Weight           

 

     tizanidine      2021      Yes                      4 mg = 1           [facility]

[facility]



     4 mg oral      2-27                               tab, PO,           l



     tablet      21:40:                               Bedtime,           [place]



               00                                 PRN for           



                                                  muscle           



                                                  spasm, #           



                                                  30 tab, 0           



                                                  Refill(s),           



                                                  Pharmacy:           



                                                  [facility]           



                                                  [facility]           



                                                  #[id number],           



                                                  165.1, cm,           



                                                  21           



                                                  15:23:00           



                                                  CST,           



                                                  Height,           



                                                  107.301,           



                                                  kg,            



                                                  21           



                                                  15:23:00           



                                                  CST,           



                                                  Weight           

 

     Acetaminoph      2021      Yes                      1 tab, PO,           

[facility]



     en 325 MG /      2-27                               Q12H, PRN           l



     tramadol      21:40:                               for pain,           [facility]

[facility]



     hydrochlori      00                                 X 15 day,           



     de 37.5 MG                                         # 30 tab,           



     Oral Tablet                                         0              



     [Ultracet]                                         Refill(s),           



                                                  Pharmacy:           



                                                  [facility]           



                                                  [facility]           



                                                  #[id number],           



                                                  165.1, cm,           



                                                  21           



                                                  15:23:00           



                                                  CST,           



                                                  Height,           



                                                  107.301,           



                                                  kg,            



                                                  21           



                                                  15:23:00           



                                                  CST,           



                                                  Weight           

 

     sevelamer      2021      Yes                      1,600 mg =           Me

[facility]



     800 mg oral      2-27                               2 tab, PO,           l



     tablet      21:21:                               TID-Meals,           [facility]

nn



               00                                 # 180 tab,           



                                                  0              



                                                  Refill(s)           

 

     sevelamer      2021      Yes                      1,600 mg =           Me

[facility]



     800 mg oral      2-27                               2 tab, PO,           l



     tablet      21:21:                               TID-Meals,           [facility]

nn



               00                                 # 180 tab,           



                                                  0              



                                                  Refill(s)           

 

     sevelamer      2021      Yes                      1,600 mg =           Me

[facility]



     800 mg oral      2-27                               2 tab, PO,           l



     tablet      21:21:                               TID-Meals,           [facility]

nn



               00                                 # 180 tab,           



                                                  0              



                                                  Refill(s)           

 

     Mupirocin      2021      Yes                      1 appl,           [facility]

ia



     0.02 MG/MG      1-22                               TOP, TID,           l



     Topical      23:21:                               X 5 day, #           [facility]

[facility]



     Ointment      00                                 22 gm, 0           



                                                  Refill(s),           



                                                  Pharmacy:           



                                                  [facility]           



                                                  #[id number],           



                                                  165.1, cm,           



                                                  21           



                                                  14:08:00           



                                                  CST,           



                                                  Height,           



                                                  136.42,           



                                                  kg,            



                                                  21           



                                                  14:08:00           



                                                  CST,           



                                                  Weight           

 

     Mupirocin      2021      Yes                      1 appl,           [facility]

ia



     0.02 MG/MG      1-22                               TOP, TID,           l



     Topical      23:21:                               X 5 day, #           [facility]

d[facility]



     Ointment      00                                 22 gm, 0           



                                                  Refill(s),           



                                                  Pharmacy:           



                                                  [facility]           



                                                  [facility]           



                                                  #[id number],           



                                                  165.1, cm,           



                                                  21           



                                                  14:08:00           



                                                  CST,           



                                                  Height,           



                                                  136.42,           



                                                  kg,            



                                                  21           



                                                  14:08:00           



                                                  CST,           



                                                  Weight           

 

     Mupirocin      2021      Yes                      1 appl,           [facility]

ia



     0.02 MG/MG      -22                               TOP, TID,           l



     Topical      23:21:                               X 5 day, #           [facility]

d[facility]



     Ointment      00                                 22 gm, 0           



                                                  Refill(s),           



                                                  Pharmacy:           



                                                  [facility]           



                                                  [facility]           



                                                  #[id number],           



                                                  165.1, cm,           



                                                  21           



                                                  14:08:00           



                                                  CST,           



                                                  Height,           



                                                  136.42,           



                                                  kg,            



                                                  21           



                                                  14:08:00           



                                                  CST,           



                                                  Weight           

 

     bumetanide      2021      Yes                      2 mg = 1           [facility]

[facility]



     2 mg oral      1-19                               tab, PO,           l



     tablet      21:11:                               Daily, #           [place]



               00                                 30 tab, 0           



                                                  Refill(s)           

 

     bumetanide      2021      Yes                      2 mg = 1           [facility]

[facility]



     2 mg oral      1-19                               tab, PO,           l



     tablet      21:11:                               Daily, #           [place]



               00                                 30 tab, 0           



                                                  Refill(s)           

 

     bumetanide      2021      Yes                      2 mg = 1           [facility]

[facility]



     2 mg oral      1-19                               tab, PO,           l



     tablet      21:11:                               Daily, #           [place]



               00                                 30 tab, 0           



                                                  Refill(s)           

 

     allopurinol      2021      Yes                      100 mg = 1           

[facility]



     100 mg oral      1-19                               tab, PO,           l



     tablet      21:10:                               BID, # 60           [facility]

n



               00                                 tab, 0           



                                                  Refill(s)           

 

     gabapentin      2021      Yes                      200 mg = 2           [facility]

[facility]



     100 MG Oral      1-19                               cap, PO,           l



     Capsule      21:10:                               Bedtime, 0           [facility]

[facility]



               00                                 Refill(s)           

 

     allopurinol      2021      Yes                      100 mg = 1           

[facility]



     100 mg oral      1-19                               tab, PO,           l



     tablet      21:10:                               BID, # 60           [facility]

n



               00                                 tab, 0           



                                                  Refill(s)           

 

     gabapentin      2021      Yes                      200 mg = 2           [facility]

[facility]



     100 MG Oral      1-19                               cap, PO,           l



     Capsule      21:10:                               Bedtime, 0           [facility]

[facility]



               00                                 Refill(s)           

 

     allopurinol      2021      Yes                      100 mg = 1           

[facility]



     100 mg oral      1-19                               tab, PO,           l



     tablet      21:10:                               BID, # 60           [facility]

n



               00                                 tab, 0           



                                                  Refill(s)           

 

     gabapentin      2021      Yes                      200 mg = 2           M

[facility]



     100 MG Oral      1-19                               cap, PO,           l



     Capsule      21:10:                               Bedtime, 0           [facility]

[facility]



               00                                 Refill(s)           

 

     midodrine      2021      Yes            10mg [id number] Take 10 mg       

    [facility]



     (PROAMATINE      1-13                          [id number] by mouth 3       

    st



     ) 10 MG      00:00:                          3D   (three)           [facility]



     tablet      00                                 times a           l



                                                  day.           

 

     BUMETanide      2021      Yes            2mg  QD   Take 2 mg           Me

[facility]



     (BUMEX) 2      1-12                               by mouth           st



     MG tablet      00:00:                               every           [facility]



               00                                 morning.           l

 

     digOXIN      2021      Yes            125ug [id number] Take 125          

 [facility]



     (LANOXIN)      1-12                          [id number] mcg by           st



     125 mcg      00:00:                          3W   mouth 3           [facility]



     (0.125 mg)      00                                 (three)           l



     tablet                                         times a           



                                                  week. On           



                                                  dialysis           



                                                  days[place]uesda           



                                                  y,             



                                                  Thursday,           



                                                  Saturday           

 

     carvediloL      2021- No                                      [facility]

i



     (COREG)      1-12 02-28                                         st



     3.125 MG      00:00: 00:00                                         [facility]



     tablet      00   :00                                          l

 

     ipratropium      2021- No        [id number] .5mg Q.25D Take 2.5      

     Methodi



     (ATROVENT)      0-05 02-28                          mL (0.5 mg           st



     0.02 %      00:00: 00:00                          total) by           [facility]

ta



     nebulizer      00   :00                           nebulizati           l



     solution                                         on 4           



                                                  (four)           



                                                  times a           



                                                  day.           

 

     QUEtiapine            Yes                      1 tablet,           [facility]

[facility]



     50 mg oral      3-30                               once a           l



     tablet      13:55:                               day, 0           [facility]



               00                                 Refill(s)           

 

     torsemide      2021-0      Yes                      1 tablet,           [facility]

[facility]



     20 mg oral      3-30                               twice a           l



     tablet      13:55:                               day, 0           [place]



               00                                 Refill(s)           

 

     QUEtiapine      -0      Yes                      1 tablet,           [facility]

[facility]



     50 mg oral      3-30                               once a           l



     tablet      13:55:                               day, 0           [place]



               00                                 Refill(s)           

 

     torsemide      -0      Yes                      1 tablet,           [facility]

[facility]



     20 mg oral      3-30                               twice a           l



     tablet      13:55:                               day, 0           [place]



               00                                 Refill(s)           

 

     QUEtiapine      -0      Yes                      1 tablet,           [facility]

[facility]



     50 mg oral      3-30                               once a           l



     tablet      13:55:                               day, 0           [facility]



               00                                 Refill(s)           

 

     torsemide      -0      Yes                      1 tablet,           [facility]

[facility]



     20 mg oral      3-30                               twice a           l



     tablet      13:55:                               day, 0           [place]



               00                                 Refill(s)           

 

     potassium      -0      Yes                      1 tablet,           [facility]

[facility]



     chloride 20      3-30                               once a           l



     mEq oral      13:54:                               day, 0           [place]



     tablet,      00                                 Refill(s)           



     extended                                                        



     release                                                        



     (KCL)                                                        

 

     potassium      -0      Yes                      1 tablet,           [facility]

[facility]



     chloride 20      3-30                               once a           l



     mEq oral      13:54:                               day, 0           [facility]



     tablet,      00                                 Refill(s)           



     extended                                                        



     release                                                        



     (KCL)                                                        

 

     potassium      -0      Yes                      1 tablet,           [facility]

[facility]



     chloride 20      3-30                               once a           l



     mEq oral      13:54:                               day, 0           [facility]



     tablet,      00                                 Refill(s)           



     extended                                                        



     release                                                        



     (KCL)                                                        

 

     allopurinol      -0      Yes                      1/2            [facility]

a



     300 mg oral      3-30                               tablet,           l



     tablet      13:53:                               once a           [facility]



               00                                 day, 0           



                                                  Refill(s)           

 

     carvedilol      -0      Yes                      1 tablet,           [facility]

[facility]



     3.125 mg      3-30                               twice a           l



     oral tablet      13:53:                               day, 0           [facility]

[facility]



               00                                 Refill(s)           

 

     Digoxin      -0      Yes                      1 tablet,           [facility]

[facility]



     0.125 MG      3-30                               once a           l



     Oral Tablet      13:53:                               day, 0           [facility]

[facility]



               00                                 Refill(s)           

 

     DULoxetine      -0      Yes                      1 capsule,           [facility]

[facility]



     60 mg oral      3-30                               once a           l



     delayed      13:53:                               day, 0           [place]



     release      00                                 Refill(s)           



     capsule                                                        

 

     apixaban 5      0      Yes                      1 tablet,           [facility]

[facility]



     MG Oral      3-30                               twice a           l



     Tablet      13:53:                               day, 0           [facility]



     [Eliquis]      00                                 Refill(s)           

 

     gabapentin      0      Yes                      1 capsule,           [facility]

[facility]



     300 MG Oral      3-30                               twice a           l



     Capsule      13:53:                               day, 0           [place]



               00                                 Refill(s)           

 

     metoprolol      0      Yes                      1 tablet,           [facility]

[facility]



     tartrate 50      3-30                               Twice a           l



     mg oral      13:53:                               day, 0           [place]



     tablet      00                                 Refill(s)           

 

     midodrine 5            Yes                      1 tablet,           [facility]

[facility]



     mg oral      3-30                               twice a           l



     tablet      13:53:                               day, 0           [facility]



               00                                 Refill(s)           

 

     allopurinol            Yes                      1/2            [facility]

[facility]



     300 mg oral      3-30                               tablet,           l



     tablet      13:53:                               once a           [place]



               00                                 day, 0           



                                                  Refill(s)           

 

     carvedilol            Yes                      1 tablet,           [facility]

[facility]



     3.125 mg      3-30                               twice a           l



     oral tablet      13:53:                               day, 0           [facility]

[facility]



               00                                 Refill(s)           

 

     Digoxin            Yes                      1 tablet,           [facility]

[facility]



     0.125 MG      3-30                               once a           l



     Oral Tablet      13:53:                               day, 0           [facility]

[facility]



               00                                 Refill(s)           

 

     DULoxetine            Yes                      1 capsule,           [facility]

[facility]



     60 mg oral      3-30                               once a           l



     delayed      13:53:                               day, 0           [facility]



     release      00                                 Refill(s)           



     capsule                                                        

 

     apixaban 5            Yes                      1 tablet,           [facility]

[facility]



     MG Oral      3-30                               twice a           l



     Tablet      13:53:                               day, 0           [facility]



     [Eliquis]      00                                 Refill(s)           

 

     gabapentin            Yes                      1 capsule,           [facility]

[facility]



     300 MG Oral      3-30                               twice a           l



     Capsule      13:53:                               day, 0           [facility]



               00                                 Refill(s)           

 

     metoprolol      0      Yes                      1 tablet,           [facility]

[facility]



     tartrate 50      3-30                               Twice a           l



     mg oral      13:53:                               day, 0           [place]



     tablet      00                                 Refill(s)           

 

     midodrine 5      0      Yes                      1 tablet,           [facility]

[facility]



     mg oral      3-30                               twice a           l



     tablet      13:53:                               day, 0           [facility]



               00                                 Refill(s)           

 

     allopurinol            Yes                      1/2            [facility]

a



     300 mg oral      3-30                               tablet,           l



     tablet      13:53:                               once a           [place]



               00                                 day, 0           



                                                  Refill(s)           

 

     carvedilol            Yes                      1 tablet,           [facility]

[facility]



     3.125 mg      3-30                               twice a           l



     oral tablet      13:53:                               day, 0           [facility]

[facility]



               00                                 Refill(s)           

 

     Digoxin            Yes                      1 tablet,           [facility]

[facility]



     0.125 MG      3-30                               once a           l



     Oral Tablet      13:53:                               day, 0           [facility]

[facility]



               00                                 Refill(s)           

 

     DULoxetine            Yes                      1 capsule,           [facility]

[facility]



     60 mg oral      3-30                               once a           l



     delayed      13:53:                               day, 0           [place]



     release      00                                 Refill(s)           



     capsule                                                        

 

     apixaban 5            Yes                      1 tablet,           [facility]

[facility]



     MG Oral      3-30                               twice a           l



     Tablet      13:53:                               day, 0           [place]



     [Eliquis]      00                                 Refill(s)           

 

     gabapentin            Yes                      1 capsule,           [facility]

[facility]



     300 MG Oral      3-30                               twice a           l



     Capsule      13:53:                               day, 0           [facility]



               00                                 Refill(s)           

 

     metoprolol            Yes                      1 tablet,           [facility]

[facility]



     tartrate 50      3-30                               Twice a           l



     mg oral      13:53:                               day, 0           [place]



     tablet      00                                 Refill(s)           

 

     midodrine 5            Yes                      1 tablet,           [facility]

[facility]



     mg oral      3-30                               twice a           l



     tablet      13:53:                               day, 0           [place]



               00                                 Refill(s)           

 

     DULoxetine      2020      Yes                      60 mg = 1           [facility]

[facility]



     60 mg oral      1-25                               cap, PO,           l



     delayed      17:17:                               Daily, #           [facility]

n



     release      00                                 30 cap, 0           



     capsule                                         Refill(s)           

 

     Spironolact      2020      Yes                      25 mg = 1           [facility]

[facility]



     one 25 MG      1-25                               tab, PO,           l



     Oral Tablet      17:17:                               Daily, 0           [facility]

[facility]



     [Aldactone]      00                                 Refill(s)           

 

     DULoxetine      2020      Yes                      60 mg = 1           [facility]

[facility]



     60 mg oral      1-25                               cap, PO,           l



     delayed      17:17:                               Daily, #           [facility]

n



     release      00                                 30 cap, 0           



     capsule                                         Refill(s)           

 

     Spironolact      2020      Yes                      25 mg = 1           [facility]

[facility]



     one 25 MG      1-25                               tab, PO,           l



     Oral Tablet      17:17:                               Daily, 0           [facility]

[facility]



     [Aldactone]      00                                 Refill(s)           

 

     DULoxetine      2020      Yes                      60 mg = 1           [facility]

[facility]



     60 mg oral      1-25                               cap, PO,           l



     delayed      17:17:                               Daily, #           [facility]

n



     release      00                                 30 cap, 0           



     capsule                                         Refill(s)           

 

     Spironolact      2020      Yes                      25 mg = 1           [facility]

[facility]



     one 25 MG      1-25                               tab, PO,           l



     Oral Tablet      17:17:                               Daily, 0           [facility]

[facility]



     [Aldactone]      00                                 Refill(s)           

 

     Digoxin      2020      Yes                      0.125 mg,           [facility]

[facility]



     0.125 MG      1-25                               PO, Daily,           l



     Oral Tablet      17:13:                               # 30 tab,           [facility]

[facility]



               00                                 0              



                                                  Refill(s)           

 

     Digoxin      2020      Yes                      0.125 mg,           [facility]

[facility]



     0.125 MG      1-25                               PO, Daily,           l



     Oral Tablet      17:13:                               # 30 tab,           [facility]

[facility]



               00                                 0              



                                                  Refill(s)           

 

     Digoxin      2020      Yes                      0.125 mg,           [facility]

[facility]



     0.125 MG      1-25                               PO, Daily,           l



     Oral Tablet      17:13:                               # 30 tab,           [facility]

[facility]



               00                                 0              



                                                  Refill(s)           

 

     Mupirocin      2020      Yes                      See            [facility]



     0.02 MG/MG      0-13                               Instructio           l



     Topical      15:44:                               ns, APPLY           [facility]

[facility]



     Ointment      00                                 EXTERNALLY           



                                                  TO THE           



                                                  AFFECTED           



                                                  AREA TWICE           



                                                  DAILY, #           



                                                  66 gm, 1           



                                                  Refill(s),           



                                                  Pharmacy:           



                                                  [facility]           



                                                  #[id number],           



                                                  170.18,           



                                                  cm,            



                                                  20           



                                                  14:42:00           



                                                  CST,           



                                                  Height,           



                                                  164.318,           



                                                  kg,            



                                                  20           



                                                  14:42:00           



                                                  CST,           



                                                  Weight           

 

     Mupirocin      2020      Yes                      See            [facility]



     0.02 MG/MG      0-13                               Instructio           l



     Topical      15:44:                               ns, APPLY           [facility]

[facility]



     Ointment      00                                 EXTERNALLY           



                                                  TO THE           



                                                  AFFECTED           



                                                  AREA TWICE           



                                                  DAILY, #           



                                                  66 gm, 1           



                                                  Refill(s),           



                                                  Pharmacy:           



                                                  [facility]           



                                                  #[id number],           



                                                  170.18,           



                                                  cm,            



                                                  20           



                                                  14:42:00           



                                                  CST,           



                                                  Height,           



                                                  164.318,           



                                                  kg,            



                                                  20           



                                                  14:42:00           



                                                  CST,           



                                                  Weight           

 

     Mupirocin      2020      Yes                      See            [facility]



     0.02 MG/MG      0-13                               Instructio           l



     Topical      15:44:                               ns, APPLY           [facility]

[facility]



     Ointment      00                                 EXTERNALLY           



                                                  TO THE           



                                                  AFFECTED           



                                                  AREA TWICE           



                                                  DAILY, #           



                                                  66 gm, 1           



                                                  Refill(s),           



                                                  Pharmacy:           



                                                  [facility]           



                                                  #[id number],           



                                                  170.18,           



                                                  cm,            



                                                  20           



                                                  14:42:00           



                                                  CST,           



                                                  Height,           



                                                  164.318,           



                                                  kg,            



                                                  20           



                                                  14:42:00           



                                                  CST,           



                                                  Weight           

 

     Nitrofurant      2020-0      Yes                      100 mg = 1           

[facility]



     oin 100 MG      8-09                               cap, PO,           l



     Oral      17:57:                               BID, X 10           [place]



     Capsule      00                                 day, # 20           



     [Macrobid]                                         cap, 0           



                                                  Refill(s),           



                                                  Pharmacy:           



                                                  [facility]           



                                                  #[id number],           



                                                  170.18,           



                                                  cm,            



                                                  20           



                                                  14:42:00           



                                                  CST,           



                                                  Height,           



                                                  164.318,           



                                                  kg,            



                                                  20           



                                                  14:42:00           



                                                  CST,           



                                                  Weight           

 

     Nitrofurant      2020-0      Yes                      100 mg = 1           

[facility]



     oin 100 MG      8-09                               cap, PO,           l



     Oral      17:57:                               BID, X 10           [facility]



     Capsule      00                                 day, # 20           



     [Macrobid]                                         cap, 0           



                                                  Refill(s),           



                                                  Pharmacy:           



                                                  [facility]           



                                                  #[id number],           



                                                  170.18,           



                                                  cm,            



                                                  20           



                                                  14:42:00           



                                                  CST,           



                                                  Height,           



                                                  164.318,           



                                                  kg,            



                                                  20           



                                                  14:42:00           



                                                  CST,           



                                                  Weight           

 

     Nitrofurant      2020-0      Yes                      100 mg = 1           

[facility]



     oin 100 MG      8-09                               cap, PO,           l



     Oral      17:57:                               BID, X 10           [place]



     Capsule      00                                 day, # 20           



     [Macrobid]                                         cap, 0           



                                                  Refill(s),           



                                                  Pharmacy:           



                                                  [facility]           



                                                  #[id number],           



                                                  170.18,           



                                                  cm,            



                                                  20           



                                                  14:42:00           



                                                  CST,           



                                                  Height,           



                                                  164.318,           



                                                  kg,            



                                                  20           



                                                  14:42:00           



                                                  CST,           



                                                  Weight           

 

     lisinopril      2020-0      Yes                      2.5 mg = 1           [facility]

[facility]



     2.5 mg oral      6-04                               tab, PO,           l



     tablet      23:26:                               Daily, #           [place]



               00                                 30 tab, 2           



                                                  Refill(s),           



                                                  called to           



                                                  pharmacy           

 

     lisinopril      2020-0      Yes                      2.5 mg = 1           [facility]

[facility]



     2.5 mg oral      6-04                               tab, PO,           l



     tablet      23:26:                               Daily, #           [facility]



               00                                 30 tab, 2           



                                                  Refill(s),           



                                                  called to           



                                                  pharmacy           

 

     lisinopril      2020-0      Yes                      2.5 mg = 1           [facility]

[facility]



     2.5 mg oral      6-04                               tab, PO,           l



     tablet      23:26:                               Daily, #           [facility]



               00                                 30 tab, 2           



                                                  Refill(s),           



                                                  called to           



                                                  pharmacy           

 

     calcitriol      2020-0      Yes                      = 1 cap,           [facility]

[facility]



     0.25 mcg      5-20                               PO, Daily,           l



     oral      12:18:                               # 90 cap,           [place]



     capsule      00                                 1              



                                                  Refill(s),           



                                                  Pharmacy:           



                                                  [facility]           



                                                  [facility]           



                                                  #[id number]           

 

     calcitriol      2020-0      Yes                      = 1 cap,           [facility]

[facility]



     0.25 mcg      5-20                               PO, Daily,           l



     oral      12:18:                               # 90 cap,           [facility]



     capsule      00                                 1              



                                                  Refill(s),           



                                                  Pharmacy:           



                                                  [facility]           



                                                  [facility]           



                                                  #[id number]           

 

     calcitriol      2020-0      Yes                      = 1 cap,           [facility]

[facility]



     0.25 mcg      5-20                               PO, Daily,           l



     oral      12:18:                               # 90 cap,           [facility]



     capsule      00                                 1              



                                                  Refill(s),           



                                                  Pharmacy:           



                                                  [facility]           



                                                  [facility]           



                                                  #[id number]           

 

     calcitriol      2020-0      Yes                      = 1 cap,           [facility]

[facility]



     0.25 mcg      4-16                               PO, Daily,           l



     oral      16:37:                               # 90           [place]



     capsule      47                                 unknown           



                                                  unit,           



                                                  Pharmacy:           



                                                  [facility]           



                                                  [facility]           



                                                  #[id number]           

 

     calcitriol      2020-0      Yes                      = 1 cap,           [facility]

[facility]



     0.25 mcg      4-16                               PO, Daily,           l



     oral      16:37:                               # 90           [place]



     capsule      47                                 unknown           



                                                  unit,           



                                                  Pharmacy:           



                                                  [facility]           



                                                  #[id number]           

 

     calcitriol      2020-0      Yes                      = 1 cap,           [facility]

[facility]



     0.25 mcg      4-16                               PO, Daily,           l



     oral      16:37:                               # 90           [place]



     capsule      47                                 unknown           



                                                  unit,           



                                                  Pharmacy:           



                                                  [facility]           



                                                  [facility]           



                                                  #[id number]           

 

     Furosemide      2020-0      Yes                      = 1 tab,           [facility]

[facility]



     40 MG Oral      4-13                               PO, Every           l



     Tablet      20:06:                               Other Day,           [facility]

[facility]



               19                                 # 45 tab,           



                                                  Pharmacy:           



                                                  [facility]           



                                                  [facility]           



                                                  #[id number]           

 

     Furosemide      2020-0      Yes                      = 1 tab,           [facility]

[facility]



     40 MG Oral      4-13                               PO, Every           l



     Tablet      20:06:                               Other Day,           [facility]

[facility]



               19                                 # 45 tab,           



                                                  Pharmacy:           



                                                  [facility]           



                                                  [facility]           



                                                  #[id number]           

 

     Furosemide      2020-0      Yes                      = 1 tab,           [facility]

[facility]



     40 MG Oral      4-13                               PO, Every           l



     Tablet      20:06:                               Other Day,           [facility]

[facility]



               19                                 # 45 tab,           



                                                  Pharmacy:           



                                                  [facility]           



                                                  [facility]           



                                                  #[id number]           

 

     prednisolon      2020-0      Yes                      1 drop in           [facility]

[facility]



     e acetate      4-10                               each eye,           l



     10 MG/ML      20:53:                               once           [place]



     Ophthalmic      00                                 daily, 0           



     Suspension                                         Refill(s)           

 

     bromfenac      2020-0      Yes                      1 drop in           [facility]

[facility]



     0.7 MG/ML      4-10                               right eye,           l



     Ophthalmic      20:53:                               once           [place]



     Solution      00                                 daily, 0           



     [Prolensa]                                         Refill(s)           

 

     prednisolon      2020-0      Yes                      1 drop in           [facility]

[facility]



     e acetate      4-10                               each eye,           l



     10 MG/ML      20:53:                               once           [place]



     Ophthalmic      00                                 daily, 0           



     Suspension                                         Refill(s)           

 

     bromfenac      2020-0      Yes                      1 drop in           [facility]

[facility]



     0.7 MG/ML      4-10                               right eye,           l



     Ophthalmic      20:53:                               once           [place]



     Solution      00                                 daily, 0           



     [Prolensa]                                         Refill(s)           

 

     prednisolon      2020-0      Yes                      1 drop in           [facility]

[facility]



     e acetate      4-10                               each eye,           l



     10 MG/ML      20:53:                               once           [place]



     Ophthalmic      00                                 daily, 0           



     Suspension                                         Refill(s)           

 

     bromfenac      2020-0      Yes                      1 drop in           [facility]

[facility]



     0.7 MG/ML      4-10                               right eye,           l



     Ophthalmic      20:53:                               once           [place]



     Solution      00                                 daily, 0           



     [Prolensa]                                         Refill(s)           

 

     Furosemide      2020-0      Yes                      = 1 tab,           [facility]

[facility]



     40 MG Oral      1-23                               PO, Every           l



     Tablet      15:13:                               Other Day,           [facility]



               34                                 # 45 tab,           



                                                  Pharmacy:           



                                                  [facility]           



                                                  #[id number]           

 

     Furosemide      2020-0      Yes                      = 1 tab,           [facility]

[facility]



     40 MG Oral      1-23                               PO, Every           l



     Tablet      15:13:                               Other Day,           [facility]



               34                                 # 45 tab,           



                                                  Pharmacy:           



                                                  [facility]           



                                                  #[id number]           

 

     Furosemide      2020-0      Yes                      = 1 tab,           [facility]

[facility]



     40 MG Oral      1-23                               PO, Every           l



     Tablet      15:13:                               Other Day,           [facility]



               34                                 # 45 tab,           



                                                  Pharmacy:           



                                                  [facility]           



                                                  #[id number]           

 

     Mupirocin      2019      Yes                      See            [facility]



     0.02 MG/MG      2-27                               Instructio           l



     Topical      19:11:                               ns, # 66           [facility]

n



     Ointment      15                                 gm, APPLY           



                                                  EXTERNALLY           



                                                  TO THE           



                                                  AFFECTED           



                                                  AREA TWICE           



                                                  DAILY,           



                                                  Pharmacy:           



                                                  [facility]           



                                                  #[id number]           

 

     Mupirocin      2019      Yes                      See            [facility]



     0.02 MG/MG      2-27                               Instructio           l



     Topical      19:11:                               ns, # 66           [facility]

n



     Ointment      15                                 gm, APPLY           



                                                  EXTERNALLY           



                                                  TO THE           



                                                  AFFECTED           



                                                  AREA TWICE           



                                                  DAILY,           



                                                  Pharmacy:           



                                                  [facility]           



                                                  [facility]           



                                                  #[id number]           

 

     Mupirocin      2019      Yes                      See            [facility]



     0.02 MG/MG      2-27                               Instructio           l



     Topical      19:11:                               ns, # 66           [facility]

n



     Ointment      15                                 gm, APPLY           



                                                  EXTERNALLY           



                                                  TO THE           



                                                  AFFECTED           



                                                  AREA TWICE           



                                                  DAILY,           



                                                  Pharmacy:           



                                                  [facility]           



                                                  [facility]           



                                                  #[id number]           

 

     Nitrofurant      2019      Yes                      100 mg = 1           

[facility]



     oin 100 MG      2-13                               cap, PO,           l



     Oral      01:44:                               BID, X 5           [place]



     Capsule      00                                 day, # 10           



     [Macrodanti                                         cap, 0           



     n]                                           Refill(s),           



                                                  Pharmacy:           



                                                  [facility]           



                                                  #[id number]           

 

     Nitrofurant      2019      Yes                      100 mg = 1           

[facility]



     oin 100 MG      2-13                               cap, PO,           l



     Oral      01:44:                               BID, X 5           [place]



     Capsule      00                                 day, # 10           



     [Macrodanti                                         cap, 0           



     n]                                           Refill(s),           



                                                  Pharmacy:           



                                                  [facility]           



                                                  [facility]           



                                                  #[id number]           

 

     Nitrofurant      2019      Yes                      100 mg = 1           

[facility]



     oin 100 MG      2-13                               cap, PO,           l



     Oral      01:44:                               BID, X 5           [place]



     Capsule      00                                 day, # 10           



     [Macrodanti                                         cap, 0           



     n]                                           Refill(s),           



                                                  Pharmacy:           



                                                  [facility]           



                                                  [facility]           



                                                  #[id number]           

 

     apixaban 5      2019      Yes                      5 mg, PO,           [facility]

[facility]



     MG Oral      0-25                               Q12H, 0           l



     Tablet      15:07:                               Refill(s)           [facility]

n



     [Eliquis]      00                                                

 

     metoprolol      2019      Yes                      50 mg = 1           [facility]

[facility]



     tartrate 50      0-25                               tab, PO,           l



     mg oral      15:07:                               BID, # 180           [facility]

[facility]



     tablet      00                                 tab, 0           



                                                  Refill(s)           

 

     Diltiazem      2019      Yes                      180 mg = 1           [facility]

[facility]



     Hydrochlori      0-25                               cap, PO,           l



     de       15:07:                               Daily, #           [facility]

[facility]



     mg/24 hours      00                                 30 cap, 0           



     oral                                         Refill(s)           



     capsule,                                                        



     extended                                                        



     release                                                        

 

     tramadol      2019      Yes                      150 mg = 1           [facility]

[facility]



     150 mg/24      0-25                               cap, PO,           l



     hours oral      15:07:                               Daily, 0           [facility]

[facility]



     capsule,      00                                 Refill(s)           



     extended                                                        



     release                                                        

 

     Acetaminoph      2019      Yes                      1 tab, PO,           

[facility]



     en 325 MG /      0-25                               Q6H, 0           l



     Hydrocodone      15:07:                               Refill(s)           [facility]

[facility]



     Bitartrate      00                                                



     5 MG Oral                                                        



     Tablet                                                        



     [Norco                                                        



     5/325]                                                        

 

     apixaban 5      2019      Yes                      5 mg, PO,           [facility]

[facility]



     MG Oral      0-25                               Q12H, 0           l



     Tablet      15:07:                               Refill(s)           [facility]

n



     [Eliquis]      00                                                

 

     metoprolol      2019      Yes                      50 mg = 1           [facility]

[facility]



     tartrate 50      0-25                               tab, PO,           l



     mg oral      15:07:                               BID, # 180           [facility]

[facility]



     tablet      00                                 tab, 0           



                                                  Refill(s)           

 

     Diltiazem      2019      Yes                      180 mg = 1           [facility]

[facility]



     Hydrochlori      0-25                               cap, PO,           l



     de       15:07:                               Daily, #           [facility]

[facility]



     mg/24 hours      00                                 30 cap, 0           



     oral                                         Refill(s)           



     capsule,                                                        



     extended                                                        



     release                                                        

 

     tramadol      2019      Yes                      150 mg = 1           [facility]

[facility]



     150 mg/24      0-25                               cap, PO,           l



     hours oral      15:07:                               Daily, 0           [facility]

[facility]



     capsule,      00                                 Refill(s)           



     extended                                                        



     release                                                        

 

     Acetaminoph      2019      Yes                      1 tab, PO,           

[facility]



     en 325 MG /      0-25                               Q6H, 0           l



     Hydrocodone      15:07:                               Refill(s)           [facility]

[facility]



     Bitartrate      00                                                



     5 MG Oral                                                        



     Tablet                                                        



     [Norco                                                        



     5/325]                                                        

 

     apixaban 5      2019      Yes                      5 mg, PO,           [facility]

[facility]



     MG Oral      0-25                               Q12H, 0           l



     Tablet      15:07:                               Refill(s)           [facility]

n



     [Eliquis]      00                                                

 

     metoprolol      2019      Yes                      50 mg = 1           Me

moria



     tartrate 50      0-25                               tab, PO,           l



     mg oral      15:07:                               BID, # 180           [facility]

daryl



     tablet      00                                 tab, 0           



                                                  Refill(s)           

 

     Diltiazem      2019      Yes                      180 mg = 1           Me

moria



     Hydrochlori      0-25                               cap, PO,           l



     de       15:07:                               Daily, #           [facility]

daryl



     mg/24 hours      00                                 30 cap, 0           



     oral                                         Refill(s)           



     capsule,                                                        



     extended                                                        



     release                                                        

 

     tramadol      2019      Yes                      150 mg = 1           [facility]

[facility]



     150 mg/24      0-25                               cap, PO,           l



     hours oral      15:07:                               Daily, 0           Her

hernandes



     capsule,      00                                 Refill(s)           



     extended                                                        



     release                                                        

 

     Acetaminoph      2019      Yes                      1 tab, PO,           

[facility]



     en 325 MG /      0-25                               Q6H, 0           l



     Hydrocodone      15:07:                               Refill(s)           H

[facility]



     Bitartrate      00                                                



     5 MG Oral                                                        



     Tablet                                                        



     [Norco                                                        



     5/325]                                                        

 

     calcitriol      2019      Yes                      = 1 cap,           [facility]

[facility]



     0.25 mcg      0-11                               PO, Daily,           l



     oral      21:10:                               # 90           [place]



     capsule      30                                 unknown           



                                                  unit,           



                                                  Pharmacy:           



                                                  [facility]           



                                                  [facility]           



                                                  #[id number]           

 

     calcitriol      2019      Yes                      = 1 cap,           [facility]

[facility]



     0.25 mcg      0-11                               PO, Daily,           l



     oral      21:10:                               # 90           [place]



     capsule      30                                 unknown           



                                                  unit,           



                                                  Pharmacy:           



                                                  [facility]           



                                                  [facility]           



                                                  #[id number]           

 

     calcitriol      2019      Yes                      = 1 cap,           [facility]

[facility]



     0.25 mcg      0-11                               PO, Daily,           l



     oral      21:10:                               # 90           [place]



     capsule      30                                 unknown           



                                                  unit,           



                                                  Pharmacy:           



                                                  [facility]           



                                                  [facility]           



                                                  #[id number]           

 

     gabapentin      -      Yes                      300 mg = 1           [facility]

[facility]



     300 MG Oral      9-27                               cap, PO,           l



     Capsule      20:54:                               BID, # 180           [facility]

daryl



               00                                 cap, 3           



                                                  Refill(s),           



                                                  called to           



                                                  pharmacy           

 

     gabapentin      -      Yes                      300 mg = 1           [facility]

[facility]



     300 MG Oral      9-27                               cap, PO,           l



     Capsule      20:54:                               BID, # 180           [facility]

daryl



               00                                 cap, 3           



                                                  Refill(s),           



                                                  called to           



                                                  pharmacy           

 

     gabapentin            Yes                      300 mg = 1           [facility]

[facility]



     300 MG Oral      9-27                               cap, PO,           l



     Capsule      20:54:                               BID, # 180           [facility]

daryl



               00                                 cap, 3           



                                                  Refill(s),           



                                                  called to           



                                                  pharmacy           

 

     allopurinol      2019-      Yes                      = 1 tab,           Me

moria



     300 mg oral      8-17                               PO, Daily,           l



     tablet      02:39:                               # 90 tabDonny

n



               31                                 Pharmacy:           



                                                  [facility]           



                                                  [facility]           



                                                  #[id number]           

 

     allopurinol      -      Yes                      = 1 tab,           Me

moria



     300 mg oral      8-17                               PO, Daily,           l



     tablet      02:39:                               # 90 tabDonny

n



               31                                 Pharmacy:           



                                                  [facility]           



                                                  [facility]           



                                                  #[id number]           

 

     allopurinol      -      Yes                      = 1 tab,           Me

moria



     300 mg oral      8-17                               PO, Daily,           l



     tablet      02:39:                               # 90 tabDonny

n



               31                                 Pharmacy:           



                                                  [facility]           



                                                  [facility]           



                                                  #[id number]           

 

     QUEtiapine            Yes                      50 mg = 1           Me

moria



     50 mg oral      6-06                               tab, PO,           l



     tablet      15:28:                               Bedtime, #           [facility]

nn



               00                                 30 tab, 1           



                                                  Refill(s)           

 

     QUEtiapine            Yes                      50 mg = 1           Me

moria



     50 mg oral      6-06                               tab, PO,           l



     tablet      15:28:                               Bedtime, #           [facility]

nn



               00                                 30 tab, 1           



                                                  Refill(s)           

 

     QUEtiapine            Yes                      50 mg = 1           Me

moria



     50 mg oral      6-06                               tab, PO,           l



     tablet      15:28:                               Bedtime, #           [facility]

nn



               00                                 30 tab, 1           



                                                  Refill(s)           

 

     eletriptan            Yes                      40 mg = 1           Me

moria



     40 mg oral      6-06                               tab, PO,           l



     tablet      15:26:                               Daily, PRN           [facility]

nn



               00                                 for            



                                                  migraine           



                                                  headache,           



                                                  may repeat           



                                                  dose once           



                                                  in 2           



                                                  hours, # 6           



                                                  tab, 0           



                                                  Refill(s)           

 

     eletriptan            Yes                      40 mg = 1           Me

moria



     40 mg oral      6-06                               tab, PO,           l



     tablet      15:26:                               Daily, PRN           [facility]

nn



               00                                 for            



                                                  migraine           



                                                  headache,           



                                                  may repeat           



                                                  dose once           



                                                  in 2           



                                                  hours, # 6           



                                                  tab, 0           



                                                  Refill(s)           

 

     eletriptan            Yes                      40 mg = 1           [facility]

moria



     40 mg oral      6-06                               tab, PO,           l



     tablet      15:26:                               Daily, PRN           [facility]

nn



               00                                 for            



                                                  migraine           



                                                  headache,           



                                                  may repeat           



                                                  dose once           



                                                  in 2           



                                                  hours, # 6           



                                                  tab, 0           



                                                  Refill(s)           

 

     atorvastati            Yes                      See            [facility]

a



     n 40 mg      3-14                               Instructio           l



     oral tablet      15:07:                               ns, TAKE 1           

[place]



               35                                 TABLET BY           



                                                  MOUTH           



                                                  EVERY           



                                                  NIGHT AT           



                                                  BEDTIME, #           



                                                  90 tab, 1           



                                                  Refill(s),           



                                                  [facility]           



                                                  [facility]           



                                                  [id number]           

 

     atorvastati            Yes                      See            [facility]

a



     n 40 mg      3-14                               Instructio           l



     oral tablet      15:07:                               ns, TAKE 1           

[place]



               35                                 TABLET BY           



                                                  MOUTH           



                                                  EVERY           



                                                  NIGHT AT           



                                                  BEDTIME, #           



                                                  90 tab, 1           



                                                  Refill(s),           



                                                  [facility]arm[facility]:           



                                                  [facility]           



                                                  [id number]           

 

     atorvastati            Yes                      See            [facility]

a



     n 40 mg      3-14                               Instructio           l



     oral tablet      15:07:                               ns, TAKE 1           

[place]



               35                                 TABLET BY           



                                                  MOUTH           



                                                  EVERY           



                                                  NIGHT AT           



                                                  BEDTIME, #           



                                                  90 tab, 1           



                                                  Refill(s),           



                                                  Pharmacy:           



                                                  [facility]           



                                                  [facility]           



                                                  [id number]           

 

     amLODIPine            Yes                      See            [facility]



     5 mg oral      3-14                               Instructio           l



     tablet      15:07:                               ns, TAKE 1           [facility]

nn



               33                                 TABLET BY           



                                                  MOUTH           



                                                  EVERY DAY,           



                                                  # 90 tab,           



                                                  1              



                                                  Refill(s),           



                                                  Pharmacy:           



                                                  [facility]           



                                                  [facility]           



                                                  [id number]           

 

     amLODIPine            Yes                      See            [facility]



     5 mg oral      3-14                               Instructio           l



     tablet      15:07:                               ns, TAKE 1           [facility]

nn



               33                                 TABLET BY           



                                                  MOUTH           



                                                  EVERY DAY,           



                                                  # 90 tab,           



                                                  1              



                                                  Refill(s),           



                                                  Pharmacy:           



                                                  [facility]           



                                                  [facility]           



                                                  [id number]           

 

     amLODIPine            Yes                      See            [facility]



     5 mg oral      3-14                               Instructio           l



     tablet      15:07:                               ns, TAKE 1           [facility]

nn



               33                                 TABLET BY           



                                                  MOUTH           



                                                  EVERY DAY,           



                                                  # 90 tab,           



                                                  1              



                                                  Refill(s),           



                                                  Pharmacy:           



                                                  [facility]           



                                                  [facility]           



                                                  [id number]           

 

     lisinopril            Yes                      See            [facility]



     5 mg oral      8-21                               In[facility]           l



     tablet      19:00:                               ns, TAKE 1           [facility]

nn



               30                                 TABLET BY           



                                                  MOUTH           



                                                  DAILY, #           



                                                  90 tab, 1           



                                                  Refill(s),           



                                                  Pharmacy:           



                                                  [facility]           



                                                  [facility]           



                                                  [id number]           

 

     lisinopril            Yes                      See            [facility]



     5 mg oral      8-21                               Instruct[facility]           l



     tablet      19:00:                               ns, TAKE 1           [facility]

nn



               30                                 TABLET BY           



                                                  MOUTH           



                                                  DAILY, #           



                                                  90 tab, 1           



                                                  Refill(s),           



                                                  Pharmacy:           



                                                  [facility]           



                                                  [facility]           



                                                  [id number]           

 

     lisinopril            Yes                      See            [facility]



     5 mg oral      8-21                               Instructio           l



     tablet      19:00:                               ns, TAKE 1           [facility]

nn



               30                                 TABLET BY           



                                                  MOUTH           



                                                  DAILY, #           



                                                  90 tab, 1           



                                                  Refill(s),           



                                                  Pharmacy:           



                                                  [facility]           



                                                  [facility]           



                                                  [id number]           

 

     atorvastati            Yes                      See            [facility]

a



     n 40 mg      8-21                               Instructio           l



     oral tablet      18:50:                               ns, TAKE 1           

[place]



               45                                 TABLET BY           



                                                  MOUTH           



                                                  EVERY           



                                                  NIGHT AT           



                                                  BEDTIME, #           



                                                  30 tab, 5           



                                                  Refill(s),           



                                                  Pharmacy:           



                                                  [facility]           



                                                  [facility]           



                                                  [id number]           

 

     atorvastati            Yes                      See            [facility]

a



     n 40 mg      8-21                               Instructio           l



     oral tablet      18:50:                               ns, TAKE 1           

[place]



               45                                 TABLET BY           



                                                  MOUTH           



                                                  EVERY           



                                                  NIGHT AT           



                                                  BEDTIME, #           



                                                  30 tab, 5           



                                                  Refill(s),           



                                                  Pharmacy:           



                                                  [facility]           



                                                  [facility]           



                                                  [id number]           

 

     atorvastati            Yes                      See            [facility]

a



     n 40 mg      8-21                               Instructio           l



     oral tablet      18:50:                               ns, TAKE 1           

[place]



               45                                 TABLET BY           



                                                  MOUTH           



                                                  EVERY           



                                                  NIGHT AT           



                                                  BEDTIME, #           



                                                  30 tab, 5           



                                                  Refill(s),           



                                                  Pharmacy:           



                                                  [facility]           



                                                  [facility]           



                                                  [id number]           

 

     amLODIPine            Yes                      See            [facility]



     5 mg oral      8-21                               Instructio           l



     tablet      18:50:                               ns, TAKE 1           [facility]

nn



               41                                 TABLET BY           



                                                  MOUTH           



                                                  EVERY DAY,           



                                                  # 30 tab,           



                                                  5              



                                                  Refill(s),           



                                                  Pharmacy:           



                                                  [facility]           



                                                  [facility]           



                                                  [id number]           

 

     amLODIPine            Yes                      See            [facility]



     5 mg oral      8-21                               Instructio           l



     tablet      18:50:                               ns, TAKE 1           [facility]

nn



               41                                 TABLET BY           



                                                  MOUTH           



                                                  EVERY DAY,           



                                                  # 30 tab,           



                                                  5              



                                                  Refill(s),           



                                                  Pharmacy:           



                                                  [facility]           



                                                  [facility]           



                                                  [id number]           

 

     amLODIPine            Yes                      See            [facility]



     5 mg oral      8-21                               Instructio           l



     tablet      18:50:                               ns, TAKE 1           [facility]

nn



               41                                 TABLET BY           



                                                  MOUTH           



                                                  EVERY DAY,           



                                                  # 30 tab,           



                                                  5              



                                                  Refill(s),           



                                                  Pharmacy:           



                                                  [facility]           



                                                  [facility]           



                                                  [id number]           

 

     lisinopril            No                       See            [facility]



     5 mg oral      7-31                               Instructio           l



     tablet      15:28:                               ns, # 90           [place]



               34                                 tab, TAKE           



                                                  1 TABLET           



                                                  BY MOUTH           



                                                  DAILY,           



                                                  Pharmacy:           



                                                  [facility]           



                                                  [facility]           



                                                  [id number]           

 

     lisinopril            No                       See            [facility]



     5 mg oral      7-31                               Instructio           l



     tablet      15:28:                               ns, # 90           [place]



               34                                 tab, TAKE           



                                                  1 TABLET           



                                                  BY MOUTH           



                                                  DAILY,           



                                                  Pharmacy:           



                                                  [facility]           



                                                  [facility]           



                                                  [id number]           

 

     lisinopril            No                       See            [facility]



     5 mg oral      7-31                               Instructio           l



     tablet      15:28:                               ns, # 90           [place]



               34                                 tab, TAKE           



                                                  1 TABLET           



                                                  BY MOUTH           



                                                  DAILY,           



                                                  Pharmacy:           



                                                  [facility]           



                                                  [facility]           



                                                  [id number]           

 

     allopurinol            No                       300 mg = 1           

[facility]



     300 mg oral      5-10                               tab, PO,           l



     tablet      13:59:                               Daily, #           [place]



               00                                 90 tab, 1           



                                                  Refill(s),           



                                                  Pharmacy:           



                                                  [facility]           



                                                  [facility]           



                                                  [id number]           

 

     allopurinol            No                       300 mg = 1           

[facility]



     300 mg oral      5-10                               tab, PO,           l



     tablet      13:59:                               Daily, #           [place]



               00                                 90 tab, 1           



                                                  Refill(s),           



                                                  Pharmacy:           



                                                  [facility]           



                                                  [facility]           



                                                  [id number]           

 

     allopurinol            No                       300 mg = 1           

[facility]



     300 mg oral      5-10                               tab, PO,           l



     tablet      13:59:                               Daily, #           [place]



               00                                 90 tab, 1           



                                                  Refill(s),           



                                                  Pharmacy:           



                                                  [place] [address]           

 

     lisinopril            No                       See            [facility]



     5 mg oral      5-01                               Instructio           l



     tablet      12:38:                               ns, # 90           [place]



               18                                 tab, TAKE           



                                                  1 TABLET           



                                                  BY MOUTH           



                                                  DAILY,           



                                                  Pharmacy:           



                                                  [facility]           



                                                  [facility]           



                                                  [id number]           

 

     ALLOPURINOL            Yes                      See            [facility]

a



     300MG      5-01                               Instructio           l



     TABLETS      12:38:                               ns, # 90           [place]

n



               18                                 tab, TAKE           



                                                  1 TABLET           



                                                  BY MOUTH           



                                                  DAILY,           



                                                  Pharmacy:           



                                                  [facility]           



                                                  [facility]           



                                                  [id number]           

 

     lisinopril            No                       See            [facility]



     5 mg oral      5-01                               Instructio           l



     tablet      12:38:                               ns, # 90           [place]



               18                                 tab, TAKE           



                                                  1 TABLET           



                                                  BY MOUTH           



                                                  DAILY,           



                                                  Pharmacy:           



                                                  [facility]           



                                                  [facility]           



                                                  [id number]           

 

     ALLOPURINOL            Yes                      See            [facility]

a



     300MG      5-01                               Instructio           l



     TABLETS      12:38:                               ns, # 90           [place]

n



               18                                 tab, TAKE           



                                                  1 TABLET           



                                                  BY MOUTH           



                                                  DAILY,           



                                                  Pharmacy:           



                                                  [facility]           



                                                  [facility]           



                                                  [id number]           

 

     lisinopril            No                       See            [facility]



     5 mg oral      5-01                               Instructio           l



     tablet      12:38:                               ns, # 90           [place]



               18                                 tab, TAKE           



                                                  1 TABLET           



                                                  BY MOUTH           



                                                  DAILY,           



                                                  Pharmacy:           



                                                  [facility]           



                                                  [facility]           



                                                  [id number]           

 

     ALLOPURINOL            Yes                      See            [facility]

a



     300MG      5-01                               Instructio           l



     TABLETS      12:38:                               ns, # 90           [place]

n



               18                                 tab, TAKE           



                                                  1 TABLET           



                                                  BY MOUTH           



                                                  DAILY,           



                                                  Pharmacy:           



                                                  [facility]           



                                                  [facility]           



                                                  [id number]           

 

     calcitriol            Yes                      0.25           [facility]



     0.25 mcg      3-28                               microgram           l



     oral      13:49:                               = 1 cap,           [place]



     capsule      00                                 PO, Daily,           



                                                  # 90 cap,           



                                                  3              



                                                  Refill(s),           



                                                  Pharmacy:           



                                                  [facility]           



                                                  [facility]           



                                                  [id number]           

 

     calcitriol            Yes                      0.25           [facility]



     0.25 mcg      3-28                               microgram           l



     oral      13:49:                               = 1 cap,           [place]



     capsule      00                                 PO, Daily,           



                                                  # 90 cap,           



                                                  3              



                                                  Refill(s),           



                                                  Pharmacy:           



                                                  [facility]           



                                                  [facility]           



                                                  [id number]           

 

     calcitriol            Yes                      0.25           [facility]



     0.25 mcg      3-28                               microgram           l



     oral      13:49:                               = 1 cap,           [place]



     capsule      00                                 PO, Daily,           



                                                  # 90 cap,           



                                                  3              



                                                  Refill(s),           



                                                  Pharmacy:           



                                                  [place] [id number]           

 

     Mupirocin            Yes                      1 appl,           [facility]

ia



     0.02 MG/MG      3-21                               TOP, BID,           l



     Topical      15:37:                               30 grams           [place]

n



     Ointment      21                                 3each, # 3           



                                                  ea, 1           



                                                  Refill(s),           



                                                  Pharmacy:           



                                                  [place] [id number]           

 

     Mupirocin            Yes                      1 appl,           [facility]

ia



     0.02 MG/MG      3-21                               TOP, BID,           l



     Topical      15:37:                               30 grams           [place]

n



     Ointment      21                                 3each, # 3           



                                                  ea, 1           



                                                  Refill(s),           



                                                  Pharmacy:           



                                                  [place] [id number]           

 

     Mupirocin            Yes                      1 appl,           [facility]

ia



     0.02 MG/MG      3-21                               TOP, BID,           l



     Topical      15:37:                               30 grams           [place]

n



     Ointment      21                                 3each, # 3           



                                                  ea, 1           



                                                  Refill(s),           



                                                  Pharmacy:           



                                                  [place] [id number]           

 

     atorvastati            Yes                      See            [facility]

a



     n 40 mg      3-21                               Instructio           l



     oral tablet      15:36:                               ns, TAKE 1           

[place]



               22                                 TABLET BY           



                                                  MOUTH           



                                                  EVERY           



                                                  NIGHT AT           



                                                  BEDTIME, #           



                                                  30 tab, 5           



                                                  Refill(s),           



                                                  Pharmacy:           



                                                  [place] [id number]           

 

     atorvastati            Yes                      See            [facility]

a



     n 40 mg      3-21                               Instructio           l



     oral tablet      15:36:                               ns, TAKE 1           

[place]



               22                                 TABLET BY           



                                                  MOUTH           



                                                  EVERY           



                                                  NIGHT AT           



                                                  BEDTIME, #           



                                                  30 tab, 5           



                                                  Refill(s),           



                                                  Pharmacy:           



                                                  [place] [id number]           

 

     atorvastati            Yes                      See            [facility]

a



     n 40 mg      3-21                               Instructio           l



     oral tablet      15:36:                               ns, TAKE 1           

[place]



               22                                 TABLET BY           



                                                  MOUTH           



                                                  EVERY           



                                                  NIGHT AT           



                                                  BEDTIME, #           



                                                  30 tab, 5           



                                                  Refill(s),           



                                                  Pharmacy:           



                                                  [place] [address]           

 

     amLODIPine            Yes                      See            [facility]



     5 mg oral      3-21                               Instructio           l



     tablet      15:36:                               ns, TAKE 1           [facility]

nn



               18                                 TABLET BY           



                                                  MOUTH           



                                                  EVERY DAY,           



                                                  # 30 tab,           



                                                  5              



                                                  Refill(s),           



                                                  Pharmacy:           



                                                  [place] [id number]           

 

     amLODIPine      2018-0      Yes                      See            [facility]



     5 mg oral      3-21                               Instructio           l



     tablet      15:36:                               ns, TAKE 1           [facility]

nn



               18                                 TABLET BY           



                                                  MOUTH           



                                                  EVERY DAY,           



                                                  # 30 tab,           



                                                  5              



                                                  Refill(s),           



                                                  Pharmacy:           



                                                  [facility]           



                                                  [facility]           



                                                  [id number]           

 

     amLODIPine            Yes                      See            [facility]



     5 mg oral      3-21                               Instructio           l



     tablet      15:36:                               ns, TAKE 1           [facility]

nn



               18                                 TABLET BY           



                                                  MOUTH           



                                                  EVERY DAY,           



                                                  # 30 tab,           



                                                  5              



                                                  Refill(s),           



                                                  Pharmacy:           



                                                  [facility]           



                                                  [facility]           



                                                  [id number]           

 

     aspirin 81            Yes                      81 mg = 1           [facility]

[facility]



     mg tablet,      1-24                               tab, PO,           l



     enteric      16:34:                               Daily, #           [facility]

n



     coated      00                                 90 tab, 3           



                                                  Refill(s)           

 

     aspirin 81            Yes                      81 mg = 1           [facility]

[facility]



     mg tablet,      1-24                               tab, PO,           l



     enteric      16:34:                               Daily, #           [facility]

n



     coated      00                                 90 tab, 3           



                                                  Refill(s)           

 

     aspirin 81            Yes                      81 mg = 1           [facility]

[facility]



     mg tablet,      1-24                               tab, PO,           l



     enteric      16:34:                               Daily, #           [facility]

n



     coated      00                                 90 tab, 3           



                                                  Refill(s)           

 

     Xopenex            No   Ghassan K                0.63 mg =           [facility]

[facility]



     0.63 mg/3      3-11           [facility]                3 mL,           l



     mL        01:00:                               Soln, NEB,           [place]



     inhalation      00                                 Once,           



     solution                                         first dose           



                                                  03/10/16           



                                                  19:00:00           



                                                  CST, stop           



                                                  date           



                                                  03/10/16           



                                                  19:00:00           



                                                  CST            

 

     Xopenex            No   Ghassan K                0.63 mg =           [facility]

[facility]



     0.63 mg/3      3-11           Chun                3 mL,           l



     mL        01:00:                               Soln, NEB,           [place]



     inhalation      00                                 Once,           



     solution                                         first dose           



                                                  03/10/16           



                                                  19:00:00           



                                                  CST, stop           



                                                  date           



                                                  03/10/16           



                                                  19:00:00           



                                                  CST            

 

     Xopenex            No   Ghassan K                0.63 mg =           [facility]

[facility]



     0.63 mg/3      3-11           Chun                3 mL,           l



     mL        01:00:                               Soln, NEB,           [place]



     inhalation      00                                 Once,           



     solution                                         first dose           



                                                  03/10/16           



                                                  19:00:00           



                                                  CST, stop           



                                                  date           



                                                  03/10/16           



                                                  19:00:00           



                                                  CST            

 

     Misc            No   Deuce                300 mL,           [facility]



     Medication      3-11           [place]                Soln-IV,           l



               00:03:                               IV, Once,           [place]



               00                                 first dose           



                                                  03/10/16           



                                                  18:03:00           



                                                  CST, stop           



                                                  date           



                                                  03/10/16           



                                                  18:03:00           



                                                  CST            

 

     Misc            No   Deuce                300 mL,           [facility]



     Medication      3-11           [place]                Soln-IV,           l



               00:03:                               IV, Once,           [place]



               00                                 first dose           



                                                  03/10/16           



                                                  18:03:00           



                                                  CST, stop           



                                                  date           



                                                  03/10/16           



                                                  18:03:00           



                                                  CST            

 

     Misc            No   Deuce                300 mL,           [facility]



     Medication      3-11           [place]                Soln-IV,           l



               00:03:                               IV, Once,           [place]



                                                first dose           



                                                  03/10/16           



                                                  18:03:00           



                                                  CST, stop           



                                                  date           



                                                  03/10/16           



                                                  18:03:00           



                                                  CST            

 

     promethazin            No   Ghassan K                12.5 mg =          

 [facility]



     e         3-11           Chun                0.5 mL,           l



               00:02:                               Injection,           [place]



               00                                 IM, Once           



                                                  PRN for           



                                                  severe           



                                                  nausea,           



                                                  first dose           



                                                  03/10/16           



                                                  18:02:00           



                                                  CST            

 

     albuterol            No   Ghassan K                2.5 mg = 3           

[facility]



     2.5 mg/3 mL      3-11           Chun                mL, Soln,           

l



     (0.083%)      00:02:                               NEB, Once           [facility]

[facility]



     inhalation      00                                 PRN for           



     solution                                         wheezing,           



                                                  first dose           



                                                  03/10/16           



                                                  18:02:00           



                                                  CST            

 

     Demerol HCl            No   Ghassan K                12.5 mg =          

 [facility]



               3-11           Chun                0.25 mL,           l



               00:02:                               Injection,           [place]



               00                                 IV Push,           



                                                  Once PRN           



                                                  for            



                                                  shivers,           



                                                  first dose           



                                                  03/10/16           



                                                  18:02:00           



                                                  CST            

 

     ondansetron            No   Ghassan K                4 mg = 2           

[facility]



               3-11           Chun                mL,            l



               00:02:                               Injection,           [place]



               00                                 IV Push,           



                                                  q15min PRN           



                                                  for            



                                                  nausea/vom           



                                                  iting,           



                                                  order           



                                                  duration:           



                                                  2 doses,           



                                                  first dose           



                                                  03/10/16           



                                                  18:02:00           



                                                  CST, stop           



                                                  date           



                                                  Limited #           



                                                  of times           

 

     Dilaudid            No   Ghassan K                0.5 mg =           [facility]

[facility]



               3-11           Chun                0.25 mL,           l



               00:02:                               Injection,           [place]



               00                                 IV Push,           



                                                  q10min PRN           



                                                  for pain           



                                                  severe           



                                                  (7-10),           



                                                  first dose           



                                                  03/10/16           



                                                  18:02:00           



                                                  CST            

 

     diphenhydrA            No   Ghassan K                25 mg =           [facility]

[facility]



     MINE      3-11           Chun                0.5 mL,           l



               00:02:                               Injection,           [place]



               00                                 IV Push,           



                                                  Once PRN           



                                                  for            



                                                  itching,           



                                                  first dose           



                                                  03/10/16           



                                                  18:02:00           



                                                  CST            

 

     LR 1,000 mL            No   Ghassan K                1,000 mL,          

 [facility]



               3-11           Chun                IV, 75           l



               00:02:                               mL/hr,           [place]



               00                                 start date           



                                                  03/10/16           



                                                  18:02:00           



                                                  CST            

 

     Saline Lock            No   Ghassan K                10 mL,           [facility]

[facility]



     Flush      3-11           Chun                Soln, IV           l



               00:02:                               Push, As           [place]



               00                                 Indicated           



                                                  PRN for           



                                                  flush,           



                                                  first dose           



                                                  03/10/16           



                                                  18:02:00           



                                                  CST            

 

     Bupivacaine            No   Ghassan K                300 mL,           [facility]

[facility]



     0.25% 300      3-11           Chun                Nerve           l



     mL pump 300      00:02:                               Block, 5           [facility]

[facility]



     mL        00                                 mL/hr,           



                                                  start date           



                                                  03/10/16           



                                                  18:02:00           



                                                  CST            

 

     promethazin            No   Ghassan K                12.5 mg =          

 [facility]



     e         3-11           Chun                0.5 mL,           l



               00:02:                               Injection,           [place]



               00                                 IM, Once           



                                                  PRN for           



                                                  severe           



                                                  nausea,           



                                                  first dose           



                                                  03/10/16           



                                                  18:02:00           



                                                  CST            

 

     albuterol            No   Ghassan K                2.5 mg = 3           

[facility]



     2.5 mg/3 mL      3-11           Chun                mL, Soln,           

l



     (0.083%)      00:02:                               NEB, Once           [facility]

[facility]



     inhalation      00                                 PRN for           



     solution                                         wheezing,           



                                                  first dose           



                                                  03/10/16           



                                                  18:02:00           



                                                  CST            

 

     Demerol HCl            No   Ghassan K                12.5 mg =          

 [facility]



               3-11           Chun                0.25 mL,           l



               00:02:                               Injection,           [place]



               00                                 IV Push,           



                                                  Once PRN           



                                                  for            



                                                  shivers,           



                                                  first dose           



                                                  03/10/16           



                                                  18:02:00           



                                                  CST            

 

     ondansetron            No   Ghassan K                4 mg = 2           

[facility]



               3-11           Chun                mL,            l



               00:02:                               Injection,           [place]



               00                                 IV Push,           



                                                  q15min PRN           



                                                  for            



                                                  nausea/vom           



                                                  iting,           



                                                  order           



                                                  duration:           



                                                  2 doses,           



                                                  first dose           



                                                  03/10/16           



                                                  18:02:00           



                                                  CST, stop           



                                                  date           



                                                  Limited #           



                                                  of times           

 

     Dilaudid            No   Ghassan K                0.5 mg =           [facility]

[facility]



               3-11           Chun                0.25 mL,           l



               00:02:                               Injection,           [place]



               00                                 IV Push,           



                                                  q10min PRN           



                                                  for pain           



                                                  severe           



                                                  (7-10),           



                                                  first dose           



                                                  03/10/16           



                                                  18:02:00           



                                                  CST            

 

     diphenhydrA            No   Ghassan K                25 mg =           [facility]

[facility]



     MINE      3-11           Chun                0.5 mL,           l



               00:02:                               Injection,           [place]



               00                                 IV Push,           



                                                  Once PRN           



                                                  for            



                                                  itching,           



                                                  first dose           



                                                  03/10/16           



                                                  18:02:00           



                                                  CST            

 

     LR 1,000 mL            No   Ghassan K                1,000 mL,          

 [facility]



               3-11           Chun                IV, 75           l



               00:02:                               mL/hr,           [place]



               00                                 start date           



                                                  03/10/16           



                                                  18:02:00           



                                                  CST            

 

     Saline Lock            No   Ghassan K                10 mL,           [facility]

[facility]



     Flush      3-11           Chun                Soln, IV           l



               00:02:                               Push, As           [place]



               00                                 Indicated           



                                                  PRN for           



                                                  flush,           



                                                  first dose           



                                                  03/10/16           



                                                  18:02:00           



                                                  CST            

 

     Bupivacaine            No   Ghassan K                300 mL,           [facility]

[facility]



     0.25% 300      3-11           Chun                Nerve           l



     mL pump 300      00:02:                               Block, 5           [facility]

[place]



     mL        00                                 mL/hr,           



                                                  start date           



                                                  03/10/16           



                                                  18:02:00           



                                                  CST            

 

     promethazin            No   Ghassan K                12.5 mg =          

 [facility]



     e         3-11           Chun                0.5 mL,           l



               00:02:                               Injection,           [place]



               00                                 IM, Once           



                                                  PRN for           



                                                  severe           



                                                  nausea,           



                                                  first dose           



                                                  03/10/16           



                                                  18:02:00           



                                                  CST            

 

     albuterol            No   Ghassan K                2.5 mg = 3           

[facility]



     2.5 mg/3 mL      3-11           Chun                mL, Soln,           

l



     (0.083%)      00:02:                               NEB, Once           [facility]

[place]



     inhalation      00                                 PRN for           



     solution                                         wheezing,           



                                                  first dose           



                                                  03/10/16           



                                                  18:02:00           



                                                  CST            

 

     Demerol HCl            No   Ghassan K                12.5 mg =          

 [facility]



               3-11           Chun                0.25 mL,           l



               00:02:                               Injection,           [place]



               00                                 IV Push,           



                                                  Once PRN           



                                                  for            



                                                  shivers,           



                                                  first dose           



                                                  03/10/16           



                                                  18:02:00           



                                                  CST            

 

     ondansetron            No   Ghassan K                4 mg = 2           

[facility]



               3-11           Chun                mL,            l



               00:02:                               Injection,           [place]



               00                                 IV Push,           



                                                  q15min PRN           



                                                  for            



                                                  nausea/vom           



                                                  iting,           



                                                  order           



                                                  duration:           



                                                  2 doses,           



                                                  first dose           



                                                  03/10/16           



                                                  18:02:00           



                                                  CST, stop           



                                                  date           



                                                  Limited #           



                                                  of times           

 

     Dilaudid            No   Ghassan K                0.5 mg =           [facility]

[facility]



               3-11           Chun                0.25 mL,           l



               00:02:                               Injection,                                            IV Push,           



                                                  q10min PRN           



                                                  for pain           



                                                  severe           



                                                  (7-10),           



                                                  first dose           



                                                  03/10/16           



                                                  18:02:00           



                                                  CST            

 

     diphenhydrA            No   Ghassan K                25 mg =           [facility]

[facility]



     MINE      3-11           Chun                0.5 mL,           l



               00:02:                               Injection,           [place]                                 IV Push,           



                                                  Once PRN           



                                                  for            



                                                  itching,           



                                                  first dose           



                                                  03/10/16           



                                                  18:02:00           



                                                  CST            

 

     LR 1,000 mL            No   Ghassan K                1,000 mL,          

 [facility]



               3-11           Chun                IV, 75           l



               00:02:                               mL/hr,                                            start date           



                                                  03/10/16           



                                                  18:02:00           



                                                  CST            

 

     Saline Lock            No   Ghassan K                10 mL,           Me

moria



     Flush      3-11           Chun                Soln, IV           l



               00:02:                               Push, As                                            Indicated           



                                                  PRN for           



                                                  flush,           



                                                  first dose           



                                                  03/10/16           



                                                  18:02:00           



                                                  CST            

 

     Bupivacaine            No   Ghassan K                300 mL,           M

[facility]



     0.25% 300      3-11           Chun                Nerve           l



     mL pump 300      00:02:                               Block, 5           He

rmann



     mL        00                                 mL/hr,           



                                                  start date           



                                                  03/10/16           



                                                  18:02:00           



                                                  CST            

 

     Misc            No   Deuce                1,000 mL,           [facility]

a



     Medication      3-10           Wheat                Soln-IV,           l



               23:42:                               IV, Once,                                            first dose           



                                                  03/10/16           



                                                  17:42:00           



                                                  CST, stop           



                                                  date           



                                                  03/10/16           



                                                  17:42:00           



                                                  CST            

 

     Misc            No   Deuce                1,000 mL,           [facility]

a



     Medication      3-10           [facility]                Soln-IV,           l



               23:42:                               IV, Once,                                            first dose           



                                                  03/10/16           



                                                  17:42:00           



                                                  CST, stop           



                                                  date           



                                                  03/10/16           



                                                  17:42:00           



                                                  CST            

 

     Misc            No   Deuce                1,000 mL,           [facility]

a



     Medication      3-10           Wheat                Soln-IV,           l



               23:42:                               IV, Once,           [place]



               00                                 first dose           



                                                  03/10/16           



                                                  17:42:00           



                                                  CST, stop           



                                                  date           



                                                  03/10/16           



                                                  17:42:00           



                                                  CST            

 

     fentaNYL      -0      No   Deuce                25 mcg =           [facility]

[facility]



               3-10           [facility]                0.5 mL,           l



               23:20:                               Injection,           [place]



               00                                 IV, Once,           



                                                  first dose           



                                                  03/10/16           



                                                  17:20:00           



                                                  CST, stop           



                                                  date           



                                                  03/10/16           



                                                  17:20:00           



                                                  CST            

 

     fentaNYL      -0      No   Deuce                25 mcg =           [facility]

[facility]



               3-10           [facility]                0.5 mL,           l



               23:20:                               Injection,           [place]



               00                                 IV, Once,           



                                                  first dose           



                                                  03/10/16           



                                                  17:20:00           



                                                  CST, stop           



                                                  date           



                                                  03/10/16           



                                                  17:20:00           



                                                  CST            

 

     fentaNYL      -0      No   Deuce                25 mcg =           [facility]

[facility]



               3-10           [facility]                0.5 mL,           l



               23:20:                               Injection,           [place]



               00                                 IV, Once,           



                                                  first dose           



                                                  03/10/16           



                                                  17:20:00           



                                                  CST, stop           



                                                  date           



                                                  03/10/16           



                                                  17:20:00           



                                                  CST            

 

     ondansetron            No   Deuce                4 mg = 2           

[facility]



               3-10           [facility]                mL,            l



               23:03:                               Injection,           [place]



               00                                 IV, Once,           



                                                  first dose           



                                                  03/10/16           



                                                  17:03:00           



                                                  CST, stop           



                                                  date           



                                                  03/10/16           



                                                  17:03:00           



                                                  CST            

 

     ondansetron            No   Deuce                4 mg = 2           

[facility]



               3-10           [facility]                mL,            l



               23:03:                               Injection,           [place]



               00                                 IV, Once,           



                                                  first dose           



                                                  03/10/16           



                                                  17:03:00           



                                                  CST, stop           



                                                  date           



                                                  03/10/16           



                                                  17:03:00           



                                                  CST            

 

     ondansetron            No   Deuce                4 mg = 2           

[facility]



               3-10           [facility]                mL,            l



               23:03:                               Injection,           [place]



               00                                 IV, Once,           



                                                  first dose           



                                                  03/10/16           



                                                  17:03:00           



                                                  CST, stop           



                                                  date           



                                                  03/10/16           



                                                  17:03:00           



                                                  CST            

 

     fentaNYL      -0      No   Deuce                25 mcg =           [facility]

[facility]



               3-10           [facility]                0.5 mL,           l



               23:00:                               Injection,           [place]



               00                                 IV, Once,           



                                                  first dose           



                                                  03/10/16           



                                                  17:00:00           



                                                  CST, stop           



                                                  date           



                                                  03/10/16           



                                                  17:00:00           



                                                  CST            

 

     fentaNYL      -0      No   Deuce                25 mcg =           [facility]

[facility]



               3-10           [facility]                0.5 mL,           l



               23:00:                               Injection,           [place]



               00                                 IV, Once,           



                                                  first dose           



                                                  03/10/16           



                                                  17:00:00           



                                                  CST, stop           



                                                  date           



                                                  03/10/16           



                                                  17:00:00           



                                                  CST            

 

     fentaNYL      -      No   Deuce                25 mcg =           [facility]

[facility]



               3-10           [facility]                0.5 mL,           l



               23:00:                               Injection,           [place]



               00                                 IV, Once,           



                                                  first dose           



                                                  03/10/16           



                                                  17:00:00           



                                                  CST, stop           



                                                  date           



                                                  03/10/16           



                                                  17:00:00           



                                                  CST            

 

     fentaNYL            No   Deuce                25 mcg =           [facility]

[facility]



               3-10           [facility]                0.5 mL,           l



               22:48:                               Injection,           [place]



               00                                 IV, Once,           



                                                  first dose           



                                                  03/10/16           



                                                  16:48:00           



                                                  CST, stop           



                                                  date           



                                                  03/10/16           



                                                  16:48:00           



                                                  CST            

 

     fentaNYL            No   Deuce                25 mcg =           [facility]

[facility]



               3-10           [place]                0.5 mL,           l



               22:48:                               Injection,           [place]



               00                                 IV, Once,           



                                                  first dose           



                                                  03/10/16           



                                                  16:48:00           



                                                  CST, stop           



                                                  date           



                                                  03/10/16           



                                                  16:48:00           



                                                  CST            

 

     fentaNYL            No   Deuce                25 mcg =           [facility]

[facility]



               3-10           [facility]                0.5 mL,           l



               22:48:                               Injection,           [place]



               00                                 IV, Once,           



                                                  first dose           



                                                  03/10/16           



                                                  16:48:00           



                                                  CST, stop           



                                                  date           



                                                  03/10/16           



                                                  16:48:00           



                                                  CST            

 

     fentaNYL            No   Deuce                25 mcg =           [facility]

[facility]



               3-10           [facility]                0.5 mL,           l



               22:37:                               Injection,           [place]



               00                                 IV, Once,           



                                                  first dose           



                                                  03/10/16           



                                                  16:37:00           



                                                  CST, stop           



                                                  date           



                                                  03/10/16           



                                                  16:37:00           



                                                  CST            

 

     fentaNYL            No   Deuce                25 mcg =           [facility]

[facility]



               3-10           [facility]                0.5 mL,           l



               22:37:                               Injection,           [place]



               00                                 IV, Once,           



                                                  first dose           



                                                  03/10/16           



                                                  16:37:00           



                                                  CST, stop           



                                                  date           



                                                  03/10/16           



                                                  16:37:00           



                                                  CST            

 

     fentaNYL      -      No   Deuce                25 mcg =           [facility]

[facility]



               3-10           [place]                0.5 mL,           l



               22:37:                               Injection,           [place]



               00                                 IV, Once,           



                                                  first dose           



                                                  03/10/16           



                                                  16:37:00           



                                                  CST, stop           



                                                  date           



                                                  03/10/16           



                                                  16:37:00           



                                                  CST            

 

     dexamethaso      2016-0      No   [facility]uce                8 mg = 2           

[facility]



     ne        3-10           [facility]                mL,            l



               22:23:                               Injection,           [place]



               00                                 IV, Once,           



                                                  first dose           



                                                  03/10/16           



                                                  16:23:00           



                                                  CST, stop           



                                                  date           



                                                  03/10/16           



                                                  16:23:00           



                                                  CST            

 

     dexamethaso      -0      No   Deuce                8 mg = 2           

[facility]



     ne        3-10           [facility]                mL,            l



               22:23:                               Injection,           [place]



               00                                 IV, Once,           



                                                  first dose           



                                                  03/10/16           



                                                  16:23:00           



                                                  CST, stop           



                                                  date           



                                                  03/10/16           



                                                  16:23:00           



                                                  CST            

 

     dexamethaso      -0      No   Deuce                8 mg = 2           

[facility]



     ne        3-10           [facility]                mL,            l



               22:23:                               Injection,           [place]



               00                                 IV, Once,           



                                                  first dose           



                                                  03/10/16           



                                                  16:23:00           



                                                  CST, stop           



                                                  date           



                                                  03/10/16           



                                                  16:23:00           



                                                  CST            

 

     Misc      -0      No   Deuce                1,000 mL,           [facility]

a



     Medication      3-10           [facility]                Soln-IV,           l



               22:21:                               IV, Once,           [place]                                 first dose           



                                                  03/10/16           



                                                  16:21:00           



                                                  CST, stop           



                                                  date           



                                                  03/10/16           



                                                  16:21:00           



                                                  CST            

 

     Misc      -0      No   Deuce                1,000 mL,           [facility]

a



     Medication      3-10           [facility]                Soln-IV,           l



               22:21:                               IV, Once,           [place]                                 first dose           



                                                  03/10/16           



                                                  16:21:00           



                                                  CST, stop           



                                                  date           



                                                  03/10/16           



                                                  16:21:00           



                                                  CST            

 

     Misc      0      No   Deuce                1,000 mL,           [facility]

a



     Medication      3-10           [facility]                Soln-IV,           l



               22:21:                               IV, Once,           [place]                                 first dose           



                                                  03/10/16           



                                                  16:21:00           



                                                  CST, stop           



                                                  date           



                                                  03/10/16           



                                                  16:21:00           



                                                  CST            

 

     clindamycin      -0      Yes  Deuce                928.125           M

[facility]



               3-10           [facility]                mg,            l



               22:18:                               Soln-IV,           [place]



               00                                 IV, Once,           



                                                  first dose           



                                                  03/10/16           



                                                  16:18:00           



                                                  CST, stop           



                                                  date           



                                                  03/10/16           



                                                  16:18:00           



                                                  CST            

 

     clindamycin      -0      Yes  Deuce                928.125           M

[facility]



               3-10           [place]                mg,            l



               22:18:                               Soln-IV,           [place]



               00                                 IV, Once,           



                                                  first dose           



                                                  03/10/16           



                                                  16:18:00           



                                                  CST, stop           



                                                  date           



                                                  03/10/16           



                                                  16:18:00           



                                                  CST            

 

     clindamycin      -0      Yes  Deuce                928.125           M

[facility]



               3-10           [place]                mg,            l



               22:18:                               Soln-IV,           [place]



               00                                 IV, Once,           



                                                  first dose           



                                                  03/10/16           



                                                  16:18:00           



                                                  CST, stop           



                                                  date           



                                                  03/10/16           



                                                  16:18:00           



                                                  CST            

 

     fentaNYL            No   Deuce                25 mcg =           [facility]

[facility]



               3-10           Wheat                0.5 mL,           l



               22:05:                               Injection,           [place]



               00                                 IV, Once,           



                                                  first dose           



                                                  03/10/16           



                                                  16:05:00           



                                                  CST, stop           



                                                  date           



                                                  03/10/16           



                                                  16:05:00           



                                                  CST            

 

     midazolam            No   Deuce                0.5 mg =           [facility]

[facility]



               3-10           Wheat                0.5 mL,           l



               22:05:                               Injection,           [place]



               00                                 IV, Once,           



                                                  first dose           



                                                  03/10/16           



                                                  16:05:00           



                                                  CST, stop           



                                                  date           



                                                  03/10/16           



                                                  16:05:00           



                                                  CST            

 

     fentaNYL      -      No   Deuce                25 mcg =           [facility]

[facility]



               3-10           Wheat                0.5 mL,           l



               22:05:                               Injection,           [place]



               00                                 IV, Once,           



                                                  first dose           



                                                  03/10/16           



                                                  16:05:00           



                                                  CST, stop           



                                                  date           



                                                  03/10/16           



                                                  16:05:00           



                                                  CST            

 

     midazolam      -      No   Deuce                0.5 mg =           [facility]

[facility]



               3-10           Wheat                0.5 mL,           l



               22:05:                               Injection,           [place]



               00                                 IV, Once,           



                                                  first dose           



                                                  03/10/16           



                                                  16:05:00           



                                                  CST, stop           



                                                  date           



                                                  03/10/16           



                                                  16:05:00           



                                                  CST            

 

     fentaNYL      -0      No   Deuce                25 mcg =           [facility]

[facility]



               3-10           Wheat                0.5 mL,           l



               22:05:                               Injection,           [place]



               00                                 IV, Once,           



                                                  first dose           



                                                  03/10/16           



                                                  16:05:00           



                                                  CST, stop           



                                                  date           



                                                  03/10/16           



                                                  16:05:00           



                                                  CST            

 

     midazolam            No   Deuce                0.5 mg =           [facility]

[facility]



               3-10           Wheat                0.5 mL,           l



               22:05:                               Injection,           [place]



               00                                 IV, Once,           



                                                  first dose           



                                                  03/10/16           



                                                  16:05:00           



                                                  CST, stop           



                                                  date           



                                                  03/10/16           



                                                  16:05:00           



                                                  CST            

 

     lidocaine      -0      No   Deuce                3 mL,           [facility]

[facility]



               3-10           Wheat                Injection,           l



               21:58:                               IV, Once,           [place]



               00                                 first dose           



                                                  03/10/16           



                                                  15:58:00           



                                                  CST, stop           



                                                  date           



                                                  03/10/16           



                                                  15:58:00           



                                                  CST            

 

     propofol            No   Deuce                120 mg =           [facility]

[facility]



               3-10           W[facility]                12 mL,           l



               21:58:                               Emulsion,           [place]



               00                                 IV, Once,           



                                                  first dose           



                                                  03/10/16           



                                                  15:58:00           



                                                  CST, stop           



                                                  date           



                                                  03/10/16           



                                                  15:58:00           



                                                  CST            

 

     lidocaine            No   Deuce                3 mL,           [facility]

[facility]



               3-10           Wheat                Injection,           l



               21:58:                               IV, Once,           [place]



               00                                 first dose           



                                                  03/10/16           



                                                  15:58:00           



                                                  CST, stop           



                                                  date           



                                                  03/10/16           



                                                  15:58:00           



                                                  CST            

 

     propofol            No   Deuce                120 mg =           [facility]

[facility]



               3-10           [place]                12 mL,           l



               21:58:                               Emulsion,           [place]



               00                                 IV, Once,           



                                                  first dose           



                                                  03/10/16           



                                                  15:58:00           



                                                  CST, stop           



                                                  date           



                                                  03/10/16           



                                                  15:58:00           



                                                  CST            

 

     lidocaine            No   Deuce                3 mL,           [facility]

[facility]



               3-10           Wheat                Injection,           l



               21:58:                               IV, Once,           [place]



               00                                 first dose           



                                                  03/10/16           



                                                  15:58:00           



                                                  CST, stop           



                                                  date           



                                                  03/10/16           



                                                  15:58:00           



                                                  CST            

 

     propofol            No   Deuce                120 mg =           [facility]

[facility]



               3-10           [place]                12 mL,           l



               21:58:                               Emulsion,           [place]



               00                                 IV, Once,           



                                                  first dose           



                                                  03/10/16           



                                                  15:58:00           



                                                  CST, stop           



                                                  date           



                                                  03/10/16           



                                                  15:58:00           



                                                  CST            

 

     midazolam            No   Deuce                0.5 mg =           [facility]

[facility]



               3-10           [place]                0.5 mL,           l



               21:50:                               Injection,           [place]



               00                                 IV, Once,           



                                                  first dose           



                                                  03/10/16           



                                                  15:50:00           



                                                  CST, stop           



                                                  date           



                                                  03/10/16           



                                                  15:50:00           



                                                  CST            

 

     fentaNYL            No   Deuce                25 mcg =           [facility]

[facility]



               3-10           [place]                0.5 mL,           l



               21:50:                               Injection,           [place]



               00                                 IV, Once,           



                                                  first dose           



                                                  03/10/16           



                                                  15:50:00           



                                                  CST, stop           



                                                  date           



                                                  03/10/16           



                                                  15:50:00           



                                                  CST            

 

     midazolam            No   Deuce                0.5 mg =           [facility]

[facility]



               3-10           [place]                0.5 mL,           l



               21:50:                               Injection,           [place]



               00                                 IV, Once,           



                                                  first dose           



                                                  03/10/16           



                                                  15:50:00           



                                                  CST, stop           



                                                  date           



                                                  03/10/16           



                                                  15:50:00           



                                                  CST            

 

     fentaNYL      2016-0      No   Deuce                25 mcg =           [facility]

[facility]



               3-10           [place]                0.5 mL,           l



               21:50:                               Injection,           [place]



               00                                 IV, Once,           



                                                  first dose           



                                                  03/10/16           



                                                  15:50:00           



                                                  CST, stop           



                                                  date           



                                                  03/10/16           



                                                  15:50:00           



                                                  CST            

 

     midazolam      -0      No   Deuce                0.5 mg =           [facility]

[facility]



               3-10           [place]                0.5 mL,           l



               21:50:                               Injection,           [place]



               00                                 IV, Once,           



                                                  first dose           



                                                  03/10/16           



                                                  15:50:00           



                                                  CST, stop           



                                                  date           



                                                  03/10/16           



                                                  15:50:00           



                                                  CST            

 

     fentaNYL      2016-0      No   Deuce                25 mcg =           [facility]

[facility]



               3-10           [place]                0.5 mL,           l



               21:50:                               Injection,           [place]



               00                                 IV, Once,           



                                                  first dose           



                                                  03/10/16           



                                                  15:50:00           



                                                  CST, stop           



                                                  date           



                                                  03/10/16           



                                                  15:50:00           



                                                  CST            

 

     midazolam      -0      No   Deuce                0.5 mg =           [facility]

[facility]



               3-10           [place]                0.5 mL,           l



               21:10:                               Injection,           [place]



               00                                 IV, Once,           



                                                  first dose           



                                                  03/10/16           



                                                  15:10:00           



                                                  CST, stop           



                                                  date           



                                                  03/10/16           



                                                  15:10:00           



                                                  CST            

 

     fentaNYL      2016-0      No   Deuce                25 mcg =           [facility]

[facility]



               3-10           [place]                0.5 mL,           l



               21:10:                               Injection,           [place]



               00                                 IV, Once,           



                                                  first dose           



                                                  03/10/16           



                                                  15:10:00           



                                                  CST, stop           



                                                  date           



                                                  03/10/16           



                                                  15:10:00           



                                                  CST            

 

     midazolam      -0      No   [facility]uce                0.5 mg =           [facility]

[facility]



               3-10           [place]                0.5 mL,           l



               21:10:                               Injection,           [place]



               00                                 IV, Once,           



                                                  first dose           



                                                  03/10/16           



                                                  15:10:00           



                                                  CST, stop           



                                                  date           



                                                  03/10/16           



                                                  15:10:00           



                                                  CST            

 

     fentaNYL      2016-0      No   Deuce                25 mcg =           [facility]

[facility]



               3-10           [place]                0.5 mL,           l



               21:10:                               Injection,           [place]



               00                                 IV, Once,           



                                                  first dose           



                                                  03/10/16           



                                                  15:10:00           



                                                  CST, stop           



                                                  date           



                                                  03/10/16           



                                                  15:10:00           



                                                  CST            

 

     midazolam      2016-0      No   [facility]uce                0.5 mg =           [facility]

[facility]



               3-10           [place]                0.5 mL,           l



               21:10:                               Injection,           [place]



               00                                 IV, Once,           



                                                  first dose           



                                                  03/10/16           



                                                  15:10:00           



                                                  CST, stop           



                                                  date           



                                                  03/10/16           



                                                  15:10:00           



                                                  CST            

 

     fentaNYL      -      No   Deuce                25 mcg =           [facility]

[facility]



               3-10           [place]                0.5 mL,           l



               21:10:                               Injection,           [place]



               00                                 IV, Once,           



                                                  first dose           



                                                  03/10/16           



                                                  15:10:00           



                                                  CST, stop           



                                                  date           



                                                  03/10/16           



                                                  15:10:00           



                                                  CST            

 

     fentaNYL            No   Deuce                25 mcg =           [facility]

[facility]



               3-10           [place]                0.5 mL,           l



               21:05:                               Injection,           [place]



               00                                 IV, Once,           



                                                  first dose           



                                                  03/10/16           



                                                  15:05:00           



                                                  CST, stop           



                                                  date           



                                                  03/10/16           



                                                  15:05:00           



                                                  CST            

 

     midazolam            No   Deuce                0.5 mg =           [facility]

[facility]



               3-10           [place]                0.5 mL,           l



               21:05:                               Injection,           [place]



               00                                 IV, Once,           



                                                  first dose           



                                                  03/10/16           



                                                  15:05:00           



                                                  CST, stop           



                                                  date           



                                                  03/10/16           



                                                  15:05:00           



                                                  CST            

 

     fentaNYL            No   Deuce                25 mcg =           [facility]

[facility]



               3-10           [place]                0.5 mL,           l



               21:05:                               Injection,           [place]



               00                                 IV, Once,           



                                                  first dose           



                                                  03/10/16           



                                                  15:05:00           



                                                  CST, stop           



                                                  date           



                                                  03/10/16           



                                                  15:05:00           



                                                  CST            

 

     midazolam            No   Deuce                0.5 mg =           Me

[facility]ia



               3-10           Wheat                0.5 mL,           l



               21:05:                               Injection,           [place]



               00                                 IV, Once,           



                                                  first dose           



                                                  03/10/16           



                                                  15:05:00           



                                                  CST, stop           



                                                  date           



                                                  03/10/16           



                                                  15:05:00           



                                                  CST            

 

     fentaNYL            No   Deuce                25 mcg =           [facility]

[facility]



               3-10           Wheat                0.5 mL,           l



               21:05:                               Injection,           [place]



               00                                 IV, Once,           



                                                  first dose           



                                                  03/10/16           



                                                  15:05:00           



                                                  CST, stop           



                                                  date           



                                                  03/10/16           



                                                  15:05:00           



                                                  CST            

 

     midazolam            No   Deuce                0.5 mg =           Me

[facility]



               3-10           Wheat                0.5 mL,           l



               21:05:                               Injection,           [place]



               00                                 IV, Once,           



                                                  first dose           



                                                  03/10/16           



                                                  15:05:00           



                                                  CST, stop           



                                                  date           



                                                  03/10/16           



                                                  15:05:00           



                                                  CST            

 

     clindamycin      -0      No   Yoan                900 mg, IV        

   [facility]



               3-10           Lei                Piggyback,           l



               20:00:                               Once,           [place]



               00                                 infuse           



                                                  over 30           



                                                  minutes,           



                                                  first dose           



                                                  03/10/16           



                                                  14:00:00           



                                                  CST, stop           



                                                  date           



                                                  03/10/16           



                                                  14:00:00           



                                                  CST,           



                                                  Prophylaxi           



                                                  s              

 

     clindamycin      -0      No   Yoan                900 mg, IV        

   [facility]



               3-10           Lei                Piggyback,           l



               20:00:                               Once,           [place]



               00                                 infuse           



                                                  over 30           



                                                  minutes,           



                                                  first dose           



                                                  03/10/16           



                                                  14:00:00           



                                                  CST, stop           



                                                  date           



                                                  03/10/16           



                                                  14:00:00           



                                                  CST,           



                                                  Prophylaxi           



                                                  s              

 

     clindamycin      -0      No   Yoan                900 mg, IV        

   [facility]



               3-10           Lei                Piggyback,           l



               20:00:                               Once,           [place]



               00                                 infuse           



                                                  over 30           



                                                  minutes,           



                                                  first dose           



                                                  03/10/16           



                                                  14:00:00           



                                                  CST, stop           



                                                  date           



                                                  03/10/16           



                                                  14:00:00           



                                                  CST,           



                                                  Prophylaxi           



                                                  s              

 

     LR 1,000 mL            No   Ghassan K                1,000 mL,          

 [facility]



               3-10           Chun                IV, 30           l



               19:27:                               mL/hr,           [place]



               00                                 start date           



                                                  03/10/16           



                                                  13:27:00           



                                                  CST            

 

     Lidocaine      -0      No   Ghassan K                0.2 mL,           [facility]

[facility]



     2% 0.2 mL      3-10           Chun                Injection,           l



     IV Start      19:27:                               Subcutaneo           Her

[facility]



     [[place]]      00                                 us, Once           



                                                  PRN for           



                                                  other (see           



                                                  comment),           



                                                  first dose           



                                                  03/10/16           



                                                  13:27:00           



                                                  CST            

 

     LR 1,000 mL      -0      No   Ghassan K                1,000 mL,          

 [facility]



               3-10           Chun                IV, 30           l



               19:27:                               mL/hr,           [place]



               00                                 start date           



                                                  03/10/16           



                                                  13:27:00           



                                                  CST            

 

     Lidocaine      -0      No   Ghassan K                0.2 mL,           [facility]

[facility]



     2% 0.2 mL      3-10           Chun                Injection,           l



     IV Start      19:27:                               Subcutaneo           Her

hernandes



     [[place]]      00                                 us, Once           



                                                  PRN for           



                                                  other (see           



                                                  comment),           



                                                  first dose           



                                                  03/10/16           



                                                  13:27:00           



                                                  CST            

 

     LR 1,000 mL            No   Ghassan K                1,000 mL,          

 [facility]



               3-10           Chun                IV, 30           l



               19:27:                               mL/hr,           [place]



               00                                 start date           



                                                  03/10/16           



                                                  13:27:00           



                                                  CST            

 

     Lidocaine            No   Ghassan K                0.2 mL,           [facility]

[facility]



     2% 0.2 mL      3-10           Chun                Injection,           l



     IV Start      19:27:                               Subcutaneo           Her

hernandes



     [[facility]]      00                                 us, Once           



                                                  PRN for           



                                                  other (see           



                                                  comment),           



                                                  first dose           



                                                  03/10/16           



                                                  13:27:00           



                                                  CST            

 

     Seroquel            Yes                      100 mg,           [facility]

a



               3-09                               Oral,           l



               14:40:                               Daily, 0           [place]



               00                                 Refill(s),           



                                                  migraines           

 

     acetaminoph            Yes                      1 tabs,           [facility]

[facility]



     en-HYDROcod      3-09                               Oral,           l



     one 325      14:40:                               q6hr, 0           [place]



     mg-5 mg      00                                 Refill(s),           



     oral tablet                                         pain           

 

     traMADol 50            Yes                      50 mg = 1           [facility]

[facility]



     mg oral      3-09                               tabs,           l



     tablet      14:40:                               Oral,           [place]



               00                                 q4hr, 0           



                                                  Refill(s),           



                                                  pain           

 

     amLODIPine-            Yes                      1 tabs,           [facility]

[facility]



     atorvastati      3-09                               Oral, qHS,           l



     n 5 mg-40      14:40:                               0              [place]



     mg oral      00                                 Refill(s),           



     tablet                                         cholestero           



                                                  l/hyperten           



                                                  alexx           

 

     Osteo            Yes                      Oral,           [facility]



     Bi-Flex      3-                               Daily, 0           l



               14:40:                               Refill(s),           [place]



               00                                 supplement           

 

     Nature's            Yes                      1,000 mg =           [facility]

[facility]



     Bounty Red      3-09                               2 caps,           l



     Krill Oil      14:40:                               Oral, BID,           [facility]

[facility]



     500 mg oral      00                                 0              



     capsule                                         Refill(s),           



                                                  supplement           

 

     aspirin 81            Yes                      81 mg = 1           Me

[facility]



     mg oral      3-09                               tabs,           l



     tablet      14:40:                               Oral,           [place]



               00                                 Daily, 0           



                                                  Refill(s),           



                                                  supplement           

 

     Axert 12.5            Yes                      12.5 mg =           Me

[facility]



     mg oral      3-09                               1 tabs,           l



     tablet      14:40:                               Oral,           [place]



               00                                 Once, PRN           



                                                  for            



                                                  migraine           



                                                  headache,           



                                                  may repeat           



                                                  dose once           



                                                  in 2           



                                                  hours, # 6           



                                                  tabs, 0           



                                                  Refill(s),           



                                                  migrainesm           



                                                  ay repeat           



                                                  dose once           



                                                  in 2 hours           

 

     Wellbutrin      2016-0      Yes                      300 mg,           [place]

[facility]an



     XL        3-                               Oral,           l



               14:40:                               q24hr, 0           [place]



               00                                 Refill(s),           



                                                  migraines           

 

     Seroquel      2016-      Yes                      100 mg,           [facility]

a



               3-                               Oral,           l



               14:40:                               Daily, 0           [place]



               00                                 Refill(s),           



                                                  migraines           

 

     acetaminoph            Yes                      1 tabs,           [facility]

[facility]



     en-HYDROcod      3-                               Oral,           l



     one 325      14:40:                               q6hr, 0           [place]



     mg-5 mg      00                                 Refill(s),           



     oral tablet                                         pain           

 

     traMADol 50      2016-      Yes                      50 mg = 1           M

[facility]



     mg oral      3-09                               tabs,           l



     tablet      14:40:                               Oral,           [place]



               00                                 q4hr, 0           



                                                  Refill(s),           



                                                  pain           

 

     amLODIPine-      2016      Yes                      1 tabs,           [facility]

[facility]



     atorvastati      3-                               Oral, qHS,           l



     n 5 mg-40      14:40:                               0              [place]



     mg oral      00                                 Refill(s),           



     tablet                                         cholestero           



                                                  l/hyperten           



                                                  alexx           

 

     Osteo      -      Yes                      Oral,           [facility]



     Bi-Flex      3-                               Daily, 0           l



               14:40:                               Refill(s),           [place]



               00                                 supplement           

 

     Nature's            Yes                      1,000 mg =           [facility]

[facility]



     Bounty Red      3-09                               2 caps,           l



     Krill Oil      14:40:                               Oral, BID,           He

[facility]



     500 mg oral      00                                 0              



     capsule                                         Refill(s),           



                                                  supplement           

 

     aspirin 81            Yes                      81 mg = 1           Me

[facility]



     mg oral      3-09                               tabs,           l



     tablet      14:40:                               Oral,           [place]



               00                                 Daily, 0           



                                                  Refill(s),           



                                                  supplement           

 

     Axert 12.5      -      Yes                      12.5 mg =           Me

[facility]



     mg oral      3-09                               1 tabs,           l



     tablet      14:40:                               Oral,           [place]



               00                                 Once, PRN           



                                                  for            



                                                  migraine           



                                                  headache,           



                                                  may repeat           



                                                  dose once           



                                                  in 2           



                                                  hours, # 6           



                                                  tabs, 0           



                                                  Refill(s),           



                                                  migrainesm           



                                                  ay repeat           



                                                  dose once           



                                                  in 2 hours           

 

     Wellbutrin      2016-      Yes                      300 mg,           [place]

[facility]an



     XL        3-                               Oral,           l



               14:40:                               q24hr, 0           [place]



               00                                 Refill(s),           



                                                  migraines           

 

     Seroquel      2016      Yes                      100 mg,           [facility]

a



               3-                               Oral,           l



               14:40:                               Daily, 0           [place]



               00                                 Refill(s),           



                                                  migraines           

 

     acetaminoph            Yes                      1 tabs,           [facility]

[facility]



     en-HYDROcod      3-09                               Oral,           l



     one 325      14:40:                               q6hr, 0           [place]



     mg-5 mg      00                                 Refill(s),           



     oral tablet                                         pain           

 

     traMADol 50            Yes                      50 mg = 1           M

[facility]



     mg oral      3-09                               tabs,           l



     tablet      14:40:                               Oral,           [place]



               00                                 q4hr, 0           



                                                  Refill(s),           



                                                  pain           

 

     amLODIPine-            Yes                      1 tabs,           [facility]

[facility]



     atorvastati      3-09                               Oral, qHS,           l



     n 5 mg-40      14:40:                               0              [place]



     mg oral      00                                 Refill(s),           



     tablet                                         cholestero           



                                                  l/hyperten           



                                                  alexx           

 

     Osteo            Yes                      Oral,           [facility]



     Bi-Flex      3                               Daily, 0           l



               14:40:                               Refill(s),           [place]



               00                                 supplement           

 

     Nature's            Yes                      1,000 mg =           [facility]

[facility]



     Bounty Red      3-09                               2 caps,           l



     Krill Oil      14:40:                               Oral, BID,           [facility]

[facility]



     500 mg oral      00                                 0              



     capsule                                         Refill(s),           



                                                  supplement           

 

     aspirin 81            Yes                      81 mg = 1           Me

[facility]



     mg oral      3-09                               tabs,           l



     tablet      14:40:                               Oral,           [place]



               00                                 Daily, 0           



                                                  Refill(s),           



                                                  supplement           

 

     Axert 12.5            Yes                      12.5 mg =           Me

[facility]



     mg oral      3-09                               1 tabs,           l



     tablet      14:40:                               Oral,           [place]



               00                                 Once, PRN           



                                                  for            



                                                  migraine           



                                                  headache,           



                                                  may repeat           



                                                  dose once           



                                                  in 2           



                                                  hours, # 6           



                                                  tabs, 0           



                                                  Refill(s),           



                                                  migrainesm           



                                                  ay repeat           



                                                  dose once           



                                                  in 2 hours           

 

     Wellbutrin            Yes                      300 mg,           [place]

[facility]



     XL        3-09                               Oral,           l



               14:40:                               q24hr, 0           [place]



               00                                 Refill(s),           



                                                  migraines           







Immunizations







           Ordered Immunization Filled Immunization Date       Status     Commen

ts   Source



           Name       Name                                        

 

           FLUCELVAX QUAD PF            2022 Completed             [facility]

st



                                 00:00:00                         [facility]

 

           Bebtelovimab            2022 Completed             [facility]



                                 00:00:00                         [facility]

 

           FLUCELVAX QUAD PF            2021-10-05 Completed             [facility]

st



                                 00:00:00                         [facility]

 

           FLUCELVAX QUAD PF            2020 Completed             [facility]

st



                                 00:00:00                         [facility]

 

           Hx influenza            2019-10-23 Completed             [facility]



           vaccine-unspecified<            00:00:00                         



           sup>1</sup>                                             

 

           Hx influenza            2019-10-23 Completed             [facility]



           vaccine-unspecified<            00:00:00                         



           sup>1</sup>                                             

 

           Hx influenza            2019-10-23 Completed             [facility]

[facility]



           vaccine-unspecified<            00:00:00                         



           sup>1</sup>                                             

 

           FLUCELVAX QUAD PF            2019-10-23 Completed             [facility]

[facility]



                                 [facility]:00:00                         [facility]

 

           Tdap                  2019 Completed             [facility]



                                 00:00:00                         [facility]

 

           pneumococcal            2019 Completed             [facility]

[facility]



           23-valent             00:00:00                         



           vaccine<sup>3</sup>                                             

 

           pneumococcal            2019 Completed             [facility]

[facility]



           23-valent             00:00:00                         



           vaccine<sup>3</sup>                                             

 

           pneumococcal            2019 Completed             [facility]

[facility]



           23-valent             00:00:00                         



           vaccine<sup>3</sup>                                             

 

           Hx influenza            2011-10-25 Completed             [facility]

[facility]



           vaccine-unspecified<            14:42:42                         



           sup>1</sup>                                             

 

           Hx influenza            2011-10-25 Completed             [facility]

[facility]



           vaccine-unspecified<            14:42:42                         



           sup>2</sup>                                             

 

           Hx influenza            2011-10-25 Completed             [facility]

[facility]



           vaccine-unspecified<            14:42:42                         



           sup>1</sup>                                             

 

           Hx influenza            2011-10-25 Completed             [facility]

[facility]



           vaccine-unspecified<            14:42:42                         



           sup>2</sup>                                             

 

           Hx influenza            2011-10-25 Completed             [facility]

[facility]



           vaccine-unspecified<            14:42:42                         



           sup>1</sup>                                             

 

           Hx influenza            2011-10-25 Completed             [facility]

[facility]



           vaccine-unspecified<            14:42:42                         



           sup>2</sup>                                             







Vital Signs







             Vital Name   Observation Time Observation Value Comments     Source

 

             Systolic blood 2022 21:24:34 114 mm[Hg]                Method

ist [facility]



             pressure                                            

 

             Diastolic blood 2022 21:24:34 57 mm[Hg]                 [facility]



             pressure                                            

 

             Heart rate   2022 21:24:34 87 /min                   [facility]

 

             Body temperature 2022 21:24:34 36.61 Sherlyn                 [facility]

 

             Respiratory rate 2022 21:24:34 18 /min                   [facility]

 

             Oxygen saturation in 2022 21:24:34 100 /min                  

[facility]



             Arterial blood by                                        



             Pulse oximetry                                        

 

             Body height  2022 05:00:00 170.2 cm                  [facility]

 

             Body weight  2022 05:00:00 107.2 kg                  [facility]

 

             BMI          2022 05:00:00 37.02 kg/m2               [facility]

 

             Temperature Oral (F) 2022 19:52:00 97.6 F                    

[facility]

 

             Height       2022 19:52:00 170.18 cm                 [facility]

 [place]

 

             Weight       2022 19:52:00                           [facility]

 [place]

 

             BMI Calculated 2022 19:52:00                           [facility]

[facility] [place]

 

             Heart Rate   2021 21:23:00                           [facility]

 [place]

 

             Systolic (mm Hg) 2021 21:23:00                           [facility]

[facility] [place]

 

             Diastolic (mm Hg) 2021 21:23:00                           [facility]

[facility] [place]

 

             Height       2021 21:23:00 165.1 cm                  [facility]

 [place]

 

             Weight       2021 21:23:00                           [facility]

 [place]

 

             BMI Calculated 2021 21:23:00                           [facility]

[facility] [place]

 

             Heart Rate   2021 20:12:00                           [facility]

 [place]

 

             Heart Rate   2021 20:08:00                           [facility]

 [place]

 

             Systolic (mm Hg) 2021 20:08:00                           [facility]

[facility] [place]

 

             Diastolic (mm Hg) 2021 20:08:00                           [facility]

[facility] [place]

 

             Height       2021 20:08:00 165.1 cm                  [facility]

 [place]

 

             Weight       2021 20:08:00                           [facility]

 [place]

 

             BMI Calculated 2021 20:08:00                           [facility]

[facility] [place]

 

             Systolic (mm Hg) 2020 15:59:00                           [facility]

[facility] [place]

 

             Diastolic (mm Hg) 2020 15:59:00                           [facility]

[facility] [place]

 

             Heart Rate   2020 15:59:00                           [facility]

 [place]

 

             Height       2020 15:59:00 165.1 cm                  [facility]

 [place]

 

             Weight       2020 15:59:00                           [facility]

 [place]

 

             BMI Calculated 2020 15:59:00                           [facility]

[facility] [place]

 

             Systolic (mm Hg) 2020 20:42:00                           [facility]

[facility] [place]

 

             Diastolic (mm Hg) 2020 20:42:00                           [facility]

[facility] [place]

 

             Heart Rate   2020 20:42:00                           [facility]

 [place]

 

             Temperature Oral (F) 2020 20:42:00 98.2 F                    

[facility]

 

             Height       2020 20:42:00 170.18 cm                 [facility]

 [place]

 

             Weight       2020 20:42:00                           [facility]

 [place]

 

             BMI Calculated 2020 20:42:00                           [facility]

[facility] [place]

 

             Systolic (mm Hg) 2019 21:53:00                           [facility]

[facility] [place]

 

             Diastolic (mm Hg) 2019 21:53:00                           [facility]

[facility] [place]

 

             Heart Rate   2019 21:53:00                           [facility]

 [place]

 

             Temperature Oral (F) 2019 21:53:00 97.9 F                    

[facility]

 

             Height       2019 21:53:00 165.1 cm                  [facility]

 [place]

 

             Weight       2019 21:53:00                           [facility]

 [place]

 

             BMI Calculated 2019 21:53:00                           [facility]

[facility] [place]

 

             Height       2019-10-25 14:54:00 165.1 cm                  [facility]

 [place]

 

             Weight       2019-10-25 14:54:00                           [facility]

 [place]

 

             BMI Calculated 2019-10-25 14:54:00                           [facility]

[facility] [place]

 

             Systolic (mm Hg) 2019-10-25 14:54:00                           [facility]

[facility] [place]

 

             Diastolic (mm Hg) 2019-10-25 14:54:00                           [facility]

[facility] [place]

 

             Heart Rate   2019-10-25 14:54:00                           [facility]

 [place]

 

             Temperature Oral (F) 2019-10-25 14:54:00 97.6 F                    

[facility]

 

             Systolic (mm Hg) 2019-10-10 15:37:00                           [facility]

[facility] [place]

 

             Diastolic (mm Hg) 2019-10-10 15:37:00                           [facility]

[facility] [place]

 

             Heart Rate   2019-10-10 15:37:00                           [facility]

 [place]

 

             Temperature Oral (F) 2019-10-10 15:37:00 98.2 F                    

[facility]

 

             Height       2019-10-10 15:37:00 165.1 cm                  [facility]

 [place]

 

             Weight       2019-10-10 15:37:00                           [facility]

 [place]

 

             BMI Calculated 2019-10-10 15:37:00                           [facility]

[facility] [place]

 

             Weight       2019 15:18:00                           [facility]

 [place]

 

             BMI Calculated 2019 15:18:00                           [facility]

[facility] [place]

 

             Height       2019 15:18:00 165.1 cm                  [facility]

 [place]

 

             Temperature Oral (F) 2019 15:18:00 98.4 F                    

[facility]

 

             Heart Rate   2019 15:18:00                           [facility]

 [place]

 

             Systolic (mm Hg) 2019 15:18:00                           [facility]

[facility] [place]

 

             Diastolic (mm Hg) 2019 15:18:00                           [facility]

[facility] [place]

 

             Height       2019 19:16:00 165.1 cm                  [facility]

 [place]

 

             BMI Calculated 2019 19:16:00                           [facility]

[facility] [place]

 

             Weight       2019 19:16:00                           [facility]

 [place]

 

             Heart Rate   2019 19:16:00                           [facility]

 [place]

 

             Systolic (mm Hg) 2019 19:16:00                           [facility] [place]

 

             Diastolic (mm Hg) 2019 19:16:00                           [facility]

[facility] [place]

 

             Height       2019 14:26:00 167.01 cm                 [facility]

 [place]

 

             BMI Calculated 2019 14:26:00                           [facility]

[facility] [place]

 

             Weight       2019 14:26:00                           [facility]

 [place]

 

             Heart Rate   2019 14:26:00                           [facility]

 [place]

 

             Temperature Oral (F) 2019 14:26:00 97.9 F                    

[facility]

 

             Systolic (mm Hg) 2019 14:26:00                           [facility]

[facility] [place]

 

             Diastolic (mm Hg) 2019 14:26:00                           [facility]

[facility] [place]

 

             Systolic (mm Hg) 2018 18:33:00                           [facility]

[facility] [place]

 

             Diastolic (mm Hg) 2018 18:33:00                           [facility]

[facility] [place]

 

             Heart Rate   2018 18:33:00                           [facility]

 [place]

 

             Weight       2018 18:33:00                           [facility]

 [place]

 

             BMI Calculated 2018 18:31:00                           [facility]

[facility] [place]

 

             Weight       2018 18:31:00                           [facility]

 [place]

 

             Systolic (mm Hg) 2018 18:31:00                           [facility] [place]

 

             Diastolic (mm Hg) 2018 18:31:00                           [facility]

[facility] [place]

 

             Height       2018 18:31:00 170.18 cm                 [facility]

 [place]

 

             Temperature Oral (F) 2018 18:31:00 98.3 F                    

[facility]

 

             Heart Rate   2018 18:31:00                           [facility]

 [place]

 

             Weight       2018 16:14:00                           [facility]

 [place]

 

             Height       2018 16:14:00 170.18 cm                 [facility]

 [place]

 

             BMI Calculated 2018 16:14:00                           [facility]

[facility] [place]

 

             Heart Rate   2018 16:14:00                           [facility]

 [place]

 

             Systolic (mm Hg) 2018 16:14:00                           [facility]

[facility] [place]

 

             Diastolic (mm Hg) 2018 16:14:00                           [facility]

[facility] [place]

 

             BMI Calculated 2018 15:06:00                           [facility]

[facility] [place]

 

             Height       2018 15:06:00 170.18 cm                 [facility]

 [place]

 

             Weight       2018 15:06:00                           [facility]

 [place]

 

             Systolic (mm Hg) 2018 15:06:00                           [facility]

[facility] [place]

 

             Diastolic (mm Hg) 2018 15:06:00                           [facility]

[facility] [place]

 

             Heart Rate   2018 15:06:00                           [facility]

 [place]

 

             Temperature Oral (F) 2018 15:06:00 97.9 F                    

[facility]

 

             Weight       2017 16:17:00                           [facility]

 [place]

 

             Height       2017 16:17:00 170.18 cm                 [facility]

 [place]

 

             BMI Calculated 2017 16:17:00                           [facility]

[facility] [place]

 

             Respitory Rate 2016 01:25:00                           [facility]

[facility] [place]

 

             Systolic (mm Hg) 2016 00:50:00                           [facility] [place]

 

             Respitory Rate 2016 00:50:00                           [facility]

[facility] [place]

 

             Heart Rate   2016 00:50:00                           [facility]

 [place]

 

             Systolic (mm Hg) 2016 00:40:00                           [facility]

[facility] [place]

 

             Respitory Rate 2016 00:40:00                           [facility]

[facility] [place]

 

             Heart Rate   2016 00:40:00                           [facility]

 [place]

 

             Heart Rate   2016 00:30:00                           [facility]

 [place]

 

             Systolic (mm Hg) 2016 00:30:00                           [facility]

[facility] [place]

 

             Temperature Oral (F) 2016-03-10 23:50:00 37.1 Sherlyn                  

[facility]

 

             Height       2016-03-10 19:23:00 169 cm                    [facility]

 [place]

 

             Weight       2016-03-10 19:23:00                           [facility] [place]

 

             Temperature Oral (F) 2016-03-10 19:23:00 36.6 Sherlyn                  

[facility] [place]

 

             Height       2016 14:13:00 170 cm                    [facility]

 [place]

 

             Weight       2016 14:13:00                           [facility]







Procedures







                Procedure       Date / Time     Performing Clinician Source



                                Performed                       

 

                PROTHROMBIN TIME WITH INR 2022 06:57:00 Faye Mitchell   [facility] 2022 06:57:00 Faye Mitchell   [facility]



                PANEL                                           

 

                MAGNESIUM LEVEL 2022 06:57:00 Faye Mitchell

spital

 

                PHOSPHORUS LEVEL 2022 06:57:00 Faye Mitchell

ospital

 

                ESTIMATED GFR   2022 06:57:00 Faye Mitchell

spital

 

                TROPONIN T      2022 06:57:00 Faye Mitchell 

spital

 

                ZZCOVID-19 ANTI-SPIKE IGG 2022 06:56:00 [facility]



                ANTIBODY TITER                                  

 

                COVID-19 [facility]OLOG[facility] PAT[facility]56[facility]



                SURVEILLANCE                                    

 

                HC COMPLETE BLD COUNT 2022 06:56:00 Faye Mitchell   [facility]



                W/AUTO DIFF                                     

 

                COVID-19 QUALITATIVE 2022 04:58:00 Suresh Pineda [facility]



                RT-PCR                                          

 

                TROPONIN T      2022 03:58:00 Faye Mitchell 

spital

 

                XR CHEST 1 VW PORTABLE 2022 01:31:25 Suresh Pineda

 [facility] METABOLIC 2022 01:20:00 Suresh ontiveros [facility]



                PANEL                                           

 

                LIPASE LEVEL    2022 01:20:00 Suresh ontiveros [facility]

 

                TROPONIN T      2022 01:20:00 Faye Mitchell 

spital

 

                B NATRIURETIC PEPTIDE 2022 01:20:00 [facility]

 

                HC COMPLETE BLD COUNT 2022 01:20:00 [facility]



                W/AUTO DIFF                                     

 

                ESTIMATED GFR   2022 01:20:00 [facility]

 

                ECG 12-LEAD     2022 01:03:45 Faye Mitchell 

spital

 

                COMPREHENSIVE METABOLIC 2022 16:47:00 [facility]



                PANEL                           Baljinder       

 

                CBC WITH PLATELET AND 2022 16:47:00 [facility]



                DIFFERENTIAL                    Baljinder       

 

                THYROID STIMULATING 2022 16:47:00 [facility]



                HORMONE                         Baljinder       

 

                PARATHYROID HORMONE 2022 16:47:00 [facility]



                                                Baljinder       

 

                VITAMIN D 25 HYDROXY LEVEL 2022 16:47:00 [facility]



                                                Baljinder       

 

                NM BONE SCAN 3 PHASE 2022-10-05 18:11:00 [facility]



                                                Baljinder       

 

                BONE DENSITY    2022-10-05 14:02:48 [facility]



                                                Baljinder       

 

                BONE DENSITY PERIPHERAL 2022-10-05 14:02:48 [facility]



                                                Baljinder       

 

                POC GLUCOSE     2022 04:49:00 Martina Douglass 

spital

 

                CBC HEMOGRAM    2022 10:05:00 Sanket Keene 

spital

 

                BASIC METABOLIC PANEL 2022 10:05:00 Sanket Keene  [facility]

 

                ESTIMATED GFR   2022 10:05:00 Adriel Sparks [facility]



                                                Baljinder       

 

                XR LUMBAR SPINE 2 OR 3 VW 2022 21:10:24 Sanket Keene  [facility]

 

                XR THORACIC SPINE 2  2022 21:10:08 JassiSanket snider  [facility]

 

                XR CHEST 1 VW PORTABLE 2022 19:05:29 Rafat il [facility]

 

                BASIC METABOLIC PANEL 2022 09:08:00 [facility]

 

                PHOSPHORUS LEVEL 2022 09:08:00 [facility]

 

                ESTIMATED GFR   2022 09:08:00 [facility]

 

                TTE COMPLETE, WO CONTRAST, 2022 15:40:58 Yue Youngohkun

 [facility]



                W DOPPLER ([id number])                                 

 

                MRI THORACIC SPINE WO 2022 02:11:31 Adriel Sparks [facility]



                CONTRAST                        Baljinder       

 

                MRI LUMBAR SPINE WO 2022 01:29:36 Quinton Centeno [facility]



                CONTRAST                        [facility]   

 

                HEPATITIS B CORE ANTIBODY 2022 17:34:00 [facility]

[facility]



                TOTAL                                           

 

                HEPATITIS B SURFACE AB, 2022 17:34:00 [facility]

[facility]



                QUANTITATIVE                                    

 

                HEPATITIS B SURFACE [facility]22 17:34:00 [facility]



                ANTIGEN                                         

 

                COVID-19 QUALITATIVE 2022 16:35:00 Quinton Centeno [facility]



                RT-PCR                          [facility]   

 

                COVID-19 OMICRON VARIANT 2022 16:35:00 [facility]



                QUALITATIVE RT-PCR                 [facility]   

 

                POC GLUCOSE     2022 16:35:00 Rafat il [facility]

[facility]

 

                POC GLUCOSE     2022 15:09:00 [facility] Centeno [facility]   

 

                CT LUMBAR SPINE WO 2022 13:41:51 Quinton Centeno [facility]



                CONTRAST                        [facility]   

 

                CT RENAL STONE PROTOCOL 2022 13:41:38 Quinton Centeno 

[facility]   

 

                HC COMPLETE BLD COUNT 2022 12:44:00 Qu[facility] Centeno [facility]



                W/AUTO DIFF                     [facility] 2022 12:44:00 [facility] 

[facility]



                PANEL                           [facility]   

 

                ESTIMATED GFR   2022 12:44:00 [facility]   

 

                MI CRITICAL CARE, E/M 2022 12:28:33 Qu[facility] Centeno [facility]



                30-74 MINUTES                   [facility]   

 

                HEPATITIS B SURFACE 2022 15:33:00                 [facility]

[place]



                ANTIGEN                                         [facility]

 

                HEPATITIS B SURFACE 2022 15:33:00                 [facility]

[facility]



                ANTIBODY                                        [facility]

 

                XR CHEST 1 VW PORTABLE 2022 13:25:18 Pamela Blanton

[facility] 2022 10:32:00 [facility]



                PANEL                                           

 

                HC COMPLETE BLD COUNT 2022 10:32:00 Shelby Calle [facility]



                W/AUTO DIFF                                     

 

                ESTIMATED GFR   2022 10:32:00 [facility]

 

                HEMODIALYSIS    2022 05:06:40 Janeen Rosenthal 

ospital



                                                [facility]        

 

                HEPATITIS B CORE ANTIBODY 2022 20:59:00 Galo [facility]



                TOTAL                           [facility]        

 

                HEPATITIS B CORE ANTIBODY 2022 20:59:00 Galo [facility]



                IGM                             Gabriela        

 

                HEPATITIS B SURFACE 2022 20:59:00 Galo [facility]



                ANTIGEN                         Gabriela        

 

                HEPATITIS B SURFACE AB, 2022 20:58:00 BarMarcelino [facility]



                QUANTITATIVE                    Gabriela        

 

                HEMODIALYSIS    2022 15:58:20 BarJaneen [facility]

ospital



                                                Gabriela        

 

                TROPONIN T      2022 15:44:00 [facility]

 

                XR CHEST 1 VW PORTABLE 2022 11:41:07 Luc [facility]

 

                TROPONIN T      2022 11:41:00 [facility]

 

                HC COMPLETE BLD COUNT 2022 11:41:00 [facility]



                W/AUTO DIFF                                     

 

                COMPREHENSIVE METABOLIC 2022 11:41:00 [facility]



                PANEL                                           

 

                PROTHROMBIN TIME WITH INR 2022 11:41:00 [facility]

 

                PARTIAL THROMBOPLASTIN 2022 11:41:00 [facility]



                TIME (PTT)                                      

 

                B NATRIURETIC PEPTIDE 2022 11:41:00 [facility]

 

                PROCALCITONIN   2022 11:41:00 [facility]

 

                CREATINE KINASE, TOTAL 2022 11:41:00 [facility]



                (CPK)                                           

 

                C-REACTIVE PROTEIN 2022 11:41:00 [facility]

 

                INTERLEUKIN 6   2022 11:41:00 [facility]

 

                FERRITIN LEVEL  2022 11:41:00 [facility]

 

                D-DIMER         2022 11:41:00 [facility]

 

                LDH             2022 11:41:00 [facility]

 

                FIBRINOGEN      2022 11:41:00 [facility]

 

                ESTIMATED GFR   2022 11:41:00 [facility]

 

                RESPIRATORY PATHOGEN PANEL 2022 09:45:00 Louann Jaime 

[facility]



                WITH COVID-19 RT-PCR                 All         

 

                XR CHEST 1 VW   2022 08:05:46 Louann Jaime 

ospiloco Carrizales         

 

                BLOOD CULTURE, AEROBIC & 2022 07:49:00 Louann Jaime [facility]



                ANAEROBIC                       All         

 

                HC COMPLETE [facility]LD [facility]22 [facility]49[facility]00 [facility]



                W/AUTO DIFF                     All         

 

                COMPREHENSIVE METABOLIC 2022 07:49:00 Louann Jaime [facility]



                PANEL                           All         

 

                TROPONIN T      2022 07:49:00 Shelby Calle [facility]

 

                B NATRIURETIC PEPTIDE 2022 07:49:00 Louann Jaime [facility]



                                                All         

 

                ESTIMATED GFR   2022 07:49:00 Louann Jaime 

osantoniotal



                                                All         

 

                ECG ED PRELIMINARY 2022 07:26:13 Louann Jaime [facility]



                INTERPRETATION                  All         

 

                ECG 12-LEAD     2022 07:20:02 Louann Jaime 

osantoniotal



                                                Serena         

 

                CT ABDOMEN PELVIS WO 2022 [facility]38:27 [facility]



                CONTRAST                                        

 

                HC COMPLETE BLD COUNT 2022 23:14:00 Mahamed [facility]



                W/AUTO DIFF                                     

 

                COMPREHENSIVE METABOLIC 2022 23:14:00 [facility]



                PANEL                                           

 

                LIPASE LEVEL    2022 23:14:00 [facility]

 

                ESTIMATED GFR   2022 23:14:00 [facility]

 

                BASIC METABOLIC PANEL 2022 22:54:00 [facility]



                                                Gabriela        

 

                ESTIMATED GFR   2022 22:54:00 [facility]        

 

                POC GLUCOSE     2022 22:04:00 Edinson Trev [facility]

 

                CT ANGIOGRAM PE CHEST 2022 00:23:56 Emilia Bonilla [facility]

 

                IR VERTEBRO LUM UNI OR IVON 2022 19:25:00 Christine Saini

jarod [facility]

 

                ECG 12-LEAD     2022 18:05:39 Mariluz Noe [facility]

 

                BASIC METABOLIC PANEL 2022 10:16:00 [facility]

 

                HC COMPLETE BLD COUNT 2022 [facility]:16:00 [facility]



                W/AUTO DIFF                                     

 

                PROTHROMBIN TIME WITH INR 2022 10:16:00 Trev Neves

[facility]

 

                ESTIMATED GFR   2022 10:16:00 Trev Neves Saúl [facility]

 

                TYPE AND SCREEN 2022 10:16:00 Trev Neves [facility]

 

                TROPONIN T      2022 02:33:00 Trev Neves [facility]

 

                HEMODIALYSIS    2022 01:59:31 Baranowssandee-Daca[facility]



                                                Gabriela        

 

                TTE COMPLETE, WO CONTRAST, 2022 00:15:00 Trev Neves

[facility]



                W DOPPLER ([id number])                                 

 

                TROPONIN T      2022 23:10:00 Edinson Trev Saúl [facility]

 

                TROPONIN T      2022 20:19:00 Trev Neves [facility]

 

                HEMODIALYSIS    2022 13:51:20 [facility]ado-Rosy[facility]

[facility]



                                                Gabriela        

 

                HC COMPLETE BLD COUNT 2022 11:10:00 [facility]



                W/AUTO DIFF                                     

 

                BASIC METABOLIC PANEL 2022 11:10:00 Parveen [facility]

 

                PARATHYROID HORMONE 2022 11:10:00 [facility]

 

                VITAMIN D 25 HYDROXY LEVEL 2022 11:10:00 Christine Saini

[facility]

 

                DIGOXIN LEVEL   2022 11:10:00 [facility]

ospital



                                                Gabriela        

 

                PHOSPHORUS LEVEL 2022 11:10:00 [facility], [facility]



                                                Gabriela        

 

                ESTIMATED GFR   2022 11:10:00 Christine Saini [facility]

 

                HC COMPLETE BLD COUNT 2022 09:45:00 Parveen [facility]



                W/AUTO DIFF                                     

 

                BASIC METABOLIC PANEL 2022 09:45:00 Parveen [facility]

 

                PROTHROMBIN TIME WITH INR 2022 09:45:00 ParveenChristine kirkland

[facility]

 

                ESTIMATED GFR   2022 09:45:00 Parveen Christine [facility]

 

                GASTROINTESTINAL PANEL 2022 22:47:00 Maheshsoni Nadia  [facility]

 

                XR CHEST 1 VW PORTABLE 2022 17:34:26 Parveen [facility]

 

                HC COMPLETE BLD COUNT 2022 10:01:00 Parveen [facility]



                W/AUTO DIFF                                     

 

                BASIC METABOLIC PANEL 2022 10:01:00 Parveen [facility]

 

                ESTIMATED GFR   2022 10:01:00 Parveen Christine [facility]

 

                HEMODIALYSIS    2022 14:21:31 Baranomargaka-Daca[facility]

[facility]



                                                Gabriela        

 

                HC COMPLETE BLD COUNT 2022 10:11:00 [facility]



                W/AUTO DIFF                                     

 

                COMPREHENSIVE METABOLIC 2022 10:11:00 Parveen Christine Mesha

[facility]



                PANEL                                           

 

                ESTIMATED GFR   2022 10:11:00 [facility]

 

                MRI LUMBAR SPINE WO 2022 02:13:34 [facility]



                CONTRAST                                        

 

                POTASSIUM LEVEL 2022 18:15:00 [facility]

[facility]



                                                Gabriela        

 

                HEPATITIS B SURFACE 2022 13:46:00 Baranowska-Daca, [facility]



                ANTIGEN                         Gabriela        

 

                HEPATITIS B SURFACE 2022 13:46:00 Baranowska-Daca, [facility]



                ANTIBODY                        Gabriela        

 

                TROPONIN T      2022 13:43:00 Rosalio Angulo Ho

spital

 

                HEMODIALYSIS    2022 13:16:08 Baranowska-Daca[facility]

[facility]



                                                Gabriela        

 

                HC COMPLETE BLD COUNT 2022 11:21:00 Rosalio Angulo

[facility]



                W/AUTO DIFF                                     

 

                PROTHROMBIN TIME WITH INR 2022 11:21:00 Angulo [facility]

 

                COMPREHENSIVE METABOLIC 2022 11:21:00 [facility]



                PANEL                                           

 

                THYROID STIMULATING 2022 11:21:00 [facility]



                HORMONE                                         

 

                ESTIMATED GFR   2022 11:21:00 Rosalio Angulo 

spital

 

                ZZCOVID-19 ANTI-SPIKE IGG 2022 06:43:00 [facility]



                ANTIBODY TITER                                  

 

                COVID-19 SEROLOGY PATIENT 2022 06:43:00 [facility]



                SURVEILLANCE                                    

 

                TROPONIN T      2022 06:43:00 Rosalio Angulo 

spital

 

                PROCALCITONIN   2022 06:43:00 Rosalio Angulo 

spital

 

                URINE CULTURE   2022 05:18:00 Nadia Mendez 

spital

 

                COVID-19 QUALITATIVE 2022 04:41:00 [facility]



                RT-PCR                                          

 

                URINALYSIS SCREEN AND 2022 04:41:00 [facility]



                MICROSCOPY, WITH REFLEX TO                                 



                CULTURE                                         

 

                CT ABDOMEN PELVIS WO 2022 03:51:11 [facility]



                CONTRAST                                        

 

                CT LUMBAR SPINE WO 2022 03:49:20 [facility]



                CONTRAST                                        

 

                COMPREHENSIVE METABOLIC 2022 03:31:00 [facility]



                [facility]EL                                           

 

                HC COMPLETE BLD COUNT 2022 03:31:00 [facility]



                W/AUTO DIFF                                     

 

                ESTIMATED GFR   2022 03:31:00 Nadia Mendez 

spital

 

                XR CHEST 1 VW   2022 03:22:49 Nadia Mendez 

spital

 

                ECG ED PRELIMINARY 2022 02:44:33 [facility]



                INTERPRETATION                                  

 

                ECG 12-LEAD     2022 02:40:03 Rosalio Angulo 

spital

 

                HEMODIALYSIS    2022 14:28:03 Baranowska-Daca[facility]

[facility]



                                                Gabriela        

 

                BASIC METABOLIC PANEL 2022 03:17:00 Galo, [facility]



                                                Gabriela        

 

                ESTIMATED GFR   2022 03:17:00 Beth[facility]Rosy[facility]

[facility]



                                                Gabriela        

 

                POC GLUCOSE     2022 02:29:00 [facility]ieves[facility]



                                                R               

 

                IR VERTEBRO CERVICAL AND 2022 20:19:56 [facility]



                THOR UNI OR IVON                 R               

 

                IR VERTEBROPLASTY EA ADDL 2022 20:19:56 Génesis, [facility]



                T OR L                          R               

 

                MI AN ELECTIVE  2022 19:47:00 Juan Jose Templeton Armk [facility]



                ENDOTRACHEAL AIRWAY                                 

 

                HEMODIALYSIS    2022 20:14:38 [facility]

[facility]



                                                Gabriela        

 

                BLOOD CULTURE, AEROBIC & 2022 20:28:00 Magy Earl

[facility]



                ANAEROBIC                                       

 

                URINE CULTURE   2022 23:56:00 [facility]Rosy[facility]

[facility]



                                                Gabriela        

 

                CT RENAL STONE PROTOCOL 2022 23:39:33 Met Galo

[facility]        

 

                URINALYSIS SCREEN AND 2022 22:29:00 Galo, [facility]



                MICROSCOPY, WITH REFLEX TO                 Gabriela        



                CULTURE                                         

 

                HEMODIALYSIS    2022 15:08:13 [facility]



                                                Gabriela        

 

                HC COMPLETE BLD COUNT 2022 09:55:00 Solsujata, [facility]



                W/AUTO DIFF                     R               

 

                BASIC METABOLIC PANEL 2022 09:55:00 [facility]



                                                R               

 

                PROTHROMBIN TIME WITH INR 2022 09:55:00 Génesis, [facility]



                                                R               

 

                URIC ACID LEVEL 2022 09:55:00 [facility], [facility]

[facility]        

 

                DIGOXIN LEVEL   2022 09:55:00 Bar[facility]owska-Daca, [facility]

[facility]        

 

                CORTISOL LEVEL, AM 2022 09:55:00 Baranowska-Daca, [facility]        

 

                HEMOGLOBIN A1C  2022 09:55:00 [facility]Daca, [facility]        

 

                AMYLASE LEVEL   2022 09:55:00 Baranowska-Daca, [facility]

ospital



                                                Gabriela        

 

                LIPASE LEVEL    2022 09:55:00 Baranowska-Daca, [facility]

[facility]        

 

                ESTIMATED GFR   2022 09:55:00 Melissa [facility]



                                                R               

 

                VITAMIN D 25 HYDROXY LEVEL 2022 09:33:00 Baranowska-Daca, 

[facility]        

 

                PARATHYROID HORMONE 2022 09:33:00 Baranowska-Daca, [facility]        

 

                PHOSPHORUS LEVEL 2022 09:33:00 Baranowska-Daca, [facility]        

 

                XR SHOULDER 2+ VW RIGHT 2022 04:05:25 Baranowska-Daca, [facility]        

 

                HEMODIALYSIS    2022 03:41:52 Baranowska-Daca, [facility]        

 

                MRI LUMBAR SPINE WO 2022 15:10:25 Bree Ivan [facility]



                CONTRAST                                        

 

                MRI THORACIC SPINE WO 2022 14:44:08 [facility]ree [facility]



                CONTRAST                                        

 

                BASIC METABOLIC PANEL 2022 10:43:00 Baranowska-Daca, [facility]        

 

                ESTIMATED GFR   2022 10:43:00 Baranowska-Daca, [facility]

[facility]        

 

                BASIC METABOLIC PANEL 2022 22:26:00 Baranowska-Daca, [facility]        

 

                ESTIMATED GFR   2022 22:26:00 Baranowska-Daca[facility]

[facility]



                                                Gabriela        

 

                HEMODIALYSIS    2022 14:03:25 Cristiano [facility]ist [facility]

ospital



                                                Gabriela        

 

                HC COMPLETE BLD COUNT 2022 09:43:00 [facility]



                W/AUTO DIFF                     Tajdin          

 

                COMPREHENSIVE METABOLIC 2022 09:43:00 [facility]



                PANEL                           Tajdin          

 

                MAGNESIUM LEVEL 2022 09:43:00 [facility]



                                                Tajdin          

 

                ESTIMATED GFR   2022 09:43:00 [facility]          

 

                TROPONIN T      2022 00:24:00 Mahamed [facility] [place]

 

                HEPATITIS B [facility]FACE [facility]22 [facility]:24[facility]



                ANTIGEN                         [facility]        

 

                HEPATITIS B SURFACE AB, 2022 00:24:00 Galo [facility]



                QUANTITATIVE                    Gabriela        

 

                HEMODIALYSIS    2022 22:22:37 Janeen Rosenthal 

ospital



                                                Gabriela        

 

                THYROID STIMULATING 2022 21:32:00 Esther Biggs [facility]



                HORMONE                                         

 

                T4, FREE        2022 21:32:00 Esther Biggs Ho

spital

 

                TROPONIN T      2022 21:32:00 Esther Biggs Ho

spital

 

                COVID-19 QUALITATIVE 2022 21:22:00 Mahamed [facility]



                RT-PCR                                          

 

                CT LUMBAR SPINE WO 2022 20:55:41 Mahamed [facility]



                CONTRAST                                        

 

                CT THORACIC SPINE WO 2022 20:53:28 Mahamed [facility]



                CONTRAST                                        

 

                CT ANGIOGRAM PE CHEST 2022 20:45:21 Mahamed [facility]

 

                ECG 12-LEAD     2022 19:35:11 Mahamed [facility] [place]

 

                XR THORACIC SPINE 2 VW 2022 19:17:36 Mahamed [facility]

 

                HC COMPLETE BLD COUNT 2022 18:50:00 Mahamed [facility]



                W/AUTO DIFF                                     

 

                COMPREHENSIVE METABOLIC 2022 18:50:00 [facility]



                PANEL                                           

 

                TROPONIN T      2022 18:50:00 [facility]

 

                ESTIMATED GFR   2022 18:50:00 [facility]

 

                LIPASE LEVEL    2022 18:50:00 [facility]

 

                ECG ED PRELIMINARY 2022 [facility]18:35 [facility]



                INTERPRETATION                                  

 

                XR CHEST 2 VW   2022 16:02:54 Brenna Jacobs 

ospital

 

                Diabetic retinal eye 2020 06:00:00                 Dillan aMrt



                exam<sup>1</sup>                                 

 

                Mammogram       2017-07-15 05:00:00                 Doreen hernandes

 

                Colonoscopy<sup>2</sup> 2017 06:00:00                 Hakeem

[facility] [place]

 

                OPEN REDUCTION INTERNAL 2016-03-10 22:13:00 Yoan Lei   [facility]

[facility] [place]



                FIXATION RADIUS                                 



                INTRA-ARTICULAR W/3                                 



                FRAGMENTS [id number]                                 



                (Right)<sup>1</sup>                                 

 

                Repair of       2016-03-10 06:00:00                 Doreen hernandes



                wrist<sup>3</sup>                                 

 

                skin cancer [facility] 2012 00:00:00                 [facility] [place]



                arm                                             

 

                Cholecystectomy                                 [facility]

 

                Procedure<sup>2</sup>                                 [facility]

ermann

 

                Tubal ligation                                  [facility]

 

                Eye operation                                   [facility]

 

                Biopsy of breast                                 [facility]

n

 

                bilateral radial                                 [facility]

n



                kerototomy                                      

 

                bilateral tubal ligation                                 [facility]

 

                colonoscopy                                     [facility] [place]

 

                Skin cancer of                                  [facility]



                arm<sup>4</sup>                                 







Plan of Care







             Planned Activity Planned Date Details      Comments     Source

 

             Future Scheduled 2022   HEPATITIS B VACCINES              [facility]



             Test         11:07:43     (1 of 3 - 3-dose              



                                       series) [code =              



                                       HEPATITIS B VACCINES              



                                       (1 of 3 - 3-dose              



                                       series)]                  

 

             Future Scheduled 2022   COVID-19 VACCINE (#1)              [facility]



             Test         11:07:43     [code = COVID-19              



                                       VACCINE (#1)]              

 

             Future Scheduled 2022   65+ PNEUMOCOCCAL              Methodi

[facility]



             Test         11:07:43     VACCINE (1 - PCV)              



                                       [code = 65+               



                                       PNEUMOCOCCAL VACCINE              



                                       (1 - PCV)]                

 

             Future Scheduled 2022   SHINGLES VACCINES (1              Met

[facility]



             Test         11:07:43     of 2) [code = SHINGLES              



                                       VACCINES (1 of 2)]              

 

             Future Scheduled 2022   Screening for              [facility]



             Test         11:07:43     malignant neoplasm of              



                                       cervix (procedure)              



                                       [code = 862706886]              

 

             Future Scheduled 2022   COLONOSCOPY SCREENING              [facility]



             Test         11:07:43     [code = COLONOSCOPY              



                                       SCREENING]                

 

             Future Scheduled 2022   BREAST CANCER              [facility]



             Test         11:07:43     SCREENING [code =              



                                       BREAST CANCER              



                                       SCREENING]                







Encounters







        Start   End     Encounter Admission Attending Care    Care    Encounter 

Source



        Date/Time Date/Time Type    Type    Clinicians Facility Department ID   

   

 

        2022         Inpatient KHANH GermanPM   ENDO    [id number]

8 [facility]



        09:00:00                         [address]

 

        2022 [facility]         Suresh Pineda 1.2.840.1 1

[id number] 

[id number]                              Methodi



        20:01:00 18:10:00 Encounter         Zhen Jenkins 21350.1.1    

     208     [facility] 3.430.2.7                 

[facility] .3.799076        

         l



                                                .8                      

 

        2022 Inpatient         [facility]     064     [id number]

[address] [place] [address]



        00:00:00 00:00:00                 [facility]                 208     Method

i



                                                                        st

 

        2022 Travel                  1.2.840.1 1.2.252.576 1645

[id number] Methodi



        00:00:00 00:00:00                         96877.1.1 350.1.13.43 679     

st



                                                3.430.2.7 0.2.7.3.698         Ho

spita



                                                .3.123168 084.8           l



                                                .8                      

 

        2022 Orders          Bridger,  1.2.840.1 [id number]

6850 Methodi



        00:00:00 00:00:00 Only            Adriel 30556.1.1         198     st



                                        Baljinder 3.430.2.7                 Hosp

mimi



                                                .3.071424                 l



                                                .8                      

 

        2022-10-21 2022-10-21 Office          [facility]  1.2.840.1 [id number]

5385 Methodi



        12:00:00 12:15:00 Visit           Adriel 57548.1.1         507     st



                                        Baljinder 3.430.2.7                 Hosp

mimi



                                                .3.653092                 l



                                                .8                      

 

        2022-10-21 2022-10-21 Travel                  1.2.840.1 1.2.724.550 8517

776224 Methodi



        00:00:00 00:00:00                         17129.1.1 350.1.13.43 055     

st



                                                3.430.2.7 0.2.7.3.698         Ho

spita



                                                .3.011171 084.8           l



                                                .8                      

 

        2022-10-21 2022-10-21 Outpatient         [facility]     [id number]

[address] [place] [address]



        00:00:00 00:00:00                 ADRIEL                 507     Metho

di



                                                                        st

 

        2022-10-12 2022-10-12 Travel                  1.2.840.1 1.2.491.081 0945

662814 Methodi



        00:00:00 00:00:00                         12739.1.1 350.1.13.43 459     

st



                                                3.430.2.7 0.2.7.3.698         Ho

spita



                                                .3.300822 084.8           l



                                                .8                      

 

        2022-10-05 2022-10-05 [facility]  1.2.840.1 [id number] 

[id number] Methodi



        12:10:58 23:59:00 Encounter         Adriel 58280.1.1         626     s

t



                                        Baljinder 3.430.2.7                 Hosp

mimi



                                                .3.671097                 l



                                                .8                      

 

        2022-10-05 2022-10-05 [facility]  1.2.840.1 [id number] 

[id number] Methodi



        09:06:47 12:09:00 Encounter         Adriel 36229.1.1         624     s

t



                                        Baljinder 3.430.2.7                 Hosp

mimi



                                                .3.607199                 l



                                                .8                      

 

        2022-10-05 2022-10-05 Hospital         [place],  1.2.840.1 [id number]

90423 Methodi



        08:25:04 09:05:00 Encounter         Adriel 23144.1.1         622     s

t



                                        Baljinder 3.430.2.7                 Hosp

mimi



                                                .3.945431                 l



                                                .8                      

 

        2022-10-05 2022-10-05 Travel                  1.2.840.1 1.2.719.525 0170

[id number] Methodi



        00:00:00 00:00:00                         12228.1.1 350.1.13.43 702     

st



                                                3.430.2.7 0.2.7.3.698         Ho

spita



                                                .3.402496 084.8           l



                                                .8                      

 

        2022-10-05 2022-10-05 [facility]         [facility]     [id number]

[address]



        00:00:00 00:00:00                 ADRIEL                 622     Metho

di



                                                                        st

 

        2022-10-05 2022-10-05 [facility]         [facility]     [id number]

[address] [place] [address]



        00:00:00 00:00:00                 ADRIEL Cotton     Metho

di



                                                                        st

 

        2022-10-05 2022-10-05 [facility]         [facility]     [id number]

[address]



        00:00:00 00:00:00                 ADRIEL                 626     Metho

di



                                                                        st

 

        2022 Office          Eker, 1.2.840.1 [id number] 086015

9152 Methodi



        14:15:00 14:15:00 Visit           Daylin  02522.1.1         502     st



                                                3.430.2.7                 Hospit

a



                                                .3.215373                 l



                                                .8                      

 

        2022 Travel                  1.2.840.1 1.2.163.642 3371

250092 Methodi



        00:00:00 00:00:00                         35024.1.1 350.1.13.43 876     

st



                                                3.430.2.7 0.2.7.3.698         Ho

spita



                                                .3.128873 084.8           l



                                                .8                      

 

        [facility]22 [facility]         [facility]     [id number]

[address]



        00:00:00 00:00:00                 DAYLIN                  502     Method

i



                                                                        st

 

        2022 Office          Bridger,  1.2.840.1 [id number]

3139 Methodi



        12:45:00 13:22:26 Visit           Adriel 48567.1.1         547     st



                                        Baljinder 3.430.2.7                 Hosp

mimi



                                                .3.235692                 l



                                                .8                      

 

        2022 Outpatient         BRIDGER,  [facility]     [id number]

[address]



        00:00:00 00:00:00                 ADRIEL                 547     Metho

di



                                                                        st

 

        2022 Travel                  1.2.840.1 1.2.148.429 8131

463920 Methodi



        00:00:00 00:00:00                         66172.1.1 350.1.13.43 541     

st



                                                3.430.2.7 0.2.7.3.698         Ho

spita



                                                .3.233397 084.8           l



                                                .8                      

 

          [facility] 1.2.840[facility]1 

[id number]                 [id number]                Methodi



        07:21:00 20:54:00 Encounter         Martina Douglass 12699.1.1         90

4     st



                                                3.430.2.7                 Hospit

a



                                                .3.538297                 l



                                                .8                      

 

        2022 Inpatient         RAFAT [facility]     064     [id number]

[address]



        00:00:00 00:00:00                 ADIL                    904     Method

i



                                                                        st

 

        2022 Prep for         Lei, 1.2.840.1 [id number] 11861

48857 Methodi



        00:00:00 00:00:00 Surgery         Louann MEDEL 92288.1.1         190     s

t



                                                3.430.2.7                 Hospit

a



                                                .3.281765                 l



                                                .8                      

 

        2022 Travel                  1.2.840.1 1.2.980.366 0668

339602 Methodi



        00:00:00 00:00:00                         45403.1.1 350.1.13.43 602     

st



                                                3.430.2.7 0.2.7.3.698         Ho

spita



                                                .3.026399 084.8           l



                                                .8                      

 

        2022 Outpatient CLEMENTE Minaya   OUTD    [id number]

562 [facility]



        04:58:00 04:58:00                 Venancio                  89      [facility]

 

        2022 Outpatient CLEMENTE Minaya   OUTD    [id number]

090 [facility]



        06:37:00 06:37:00                 Venancio                  64      [facility]

 

        2022 Lab                     [facility]   [id number]

053 [facility]



        00:00:00 00:00:00 Requisitio                                         [facility]

 

        2022 Outpatient         [facility]     476

[address]



        00:00:00 00:00:00                                         [address]   [place]



                                                                        Urology

 

        2022 Emergency         Louann Jaime 1.2.840

.1 [id number] 

[id number]                              Methodi



        02:04:00 18:01:00                 Aki Dupree 41836.1.1         026     

[facility]430[facility]          

       [facility]



                                        Marlon Squiresish .3.965378                 

l



                                                .8                      

 

        2022 Outpatient         [facility]     064     [id number]

[address]



        00:00:00 00:00:00                 FANTASMA Christine     Method

i



                                                                        st

 

        2022 Travel                  1.2.840.1 1.2.915.046 7169

026415 Methodi



        00:00:00 00:00:00                         61885.1.1 350.1.13.43 552     

st



                                                3.430.2.7 0.2.7.3.698         Ho

spita



                                                .3.961294 084.8           l



                                                .8                      

 

        2022 Outpatient                 [facility]

[id number] Memoria



        21:20:00 04:59:59                         r       Medicine 56      l



                                                        Rudy         Donny

n

 

        2022 Outpatient                 [facility]Flavo [facility]

[id number] Memoria



        21:20:00 04:59:59                         r       Medicine 56      l



                                                        Rudy Hines

n

 

        2022 Outpatient         Peter,  NESSA    MG    [id number]

365 



        16:20:00 23:59:59                 Charisse MARTINEZ                 56      

 

        2022 Outpatient                 [facility]    [facility]    [id number]

365 [facility]



        16:20:00 16:20:00                                         56      l



                                                                        Barron

 

        2022 Emergency         [facility],  1.2.840.1 [id number] 2100

[id number] Methodi



        17:10:00 22:30:00                 Martha   06876.1.1         503     [facility] 3.430.2.7                 Hosp

mimi



                                                .3.971006                 l



                                                .8                      

 

        2022 Travel                  1.2.840.1 1.2.316.781 2747

449956 Methodi



        00:00:00 00:00:00                         11363.1.1 350.1.13.43 329     

st



                                                3.430.2.7 0.2.7.3.698         Ho

spita



                                                .3.694594 084.8           l



                                                .8                      

 

        2022 Emergency         [facility]  [facility]     064     [id number]

[address] [place] [address]



        00:00:00 00:00:00                 MARTHA                   503     Method

i



                                                                        st

 

        2022 [facility]         Parveen Hutchinson . 1.2.840.1 104

[id number] 

[id number]                              Methodi



        21:20:00 13:07:00 Encounter         Rosalio Angulo 17965.1.1         968  

   [facility] 3.430.2.7         

        Hospita



                                        Trev Neves .3.014548          

       l



                                                .8                      

 

        2022 Inpatient         [facility]     064     [id number]

[address] [place] [address]



        00:00:00 00:00:00                 TREV                   968     Method

i



                                                                        st

 

        2022 Anesthesia         Hasmukh,   1.2.840.1 [id number]

[id number] Methodi



        13:41:00 14:30:00 Event           Mariluz  12846.1.1         538     st



                                        Laura  3.430.2.7                 Hospit

a



                                                .3.464638                 l



                                                .8                      

 

        2022 Travel                  1.2.840.1 1.2.909.643 5501

639444 Methodi



        00:00:00 00:00:00                         90082.1.1 350.1.13.43 714     

st



                                                3.430.2.7 0.2.7.3.698         Ho

spita



                                                .3.278676 084.8           l



                                                .8                      

 

        [facility]    LA0

[id number] [facility]



        07:07:00 07:07:00                                         79      [facility]

 

        2022 Outpatient Suzie Ramos [facility]   F94

7 [facility]



        07:07:00 07:07:00                                            [facility]

 

        2022 Phone                   nullFlavo [facility] 3467

620814 Memoria



        15:25:04 04:59:59 Message                 r       Medicine 17      l



                                                        Rudy Hines

michelle

 

        2022 Phone                   nullFlavo [facility] 3467

473067 Memoria



        15:25:04 04:59:59 Message                 r       Medicine 17      l



                                                        Rudy Hines

michelle

 

        2022 Outpatient                 [facility]    [id number]

355 



        10:25:04 23:59:59                                         17      

 

        2022 Emergency EM      [facility]    LA00

396580 [facility]



        12:08:00 15:51:00                                         00      [facility]

 

        2022 [facility]      [facility]45

77202 [facility]



        12:08:00 15:51:00                                            [facility]

 

        2022 [facility]    

77457 [facility]



        07:10:00 07:10:00                 Clark                   92      [facility]

 

        2022 Ambulatory                 nullFlavo [facility] 3

[id number] Memoria



        15:15:00 15:15:00 Pre-Reg                 r       Medicine 54      l



                                                        Rudy Hines

n

 

        2022 Ambulatory                 [facility]Flavo [facility]

[id number] [facility]



        15:15:00 15:15:00 Pre-Reg                 r       Medicine 54      l



                                                        Rudy Hines

n

 

        2022 Outpatient                 [facility]    [facility]    [id number]

365 [facility]



        10:15:00 10:15:00                                         54      dennis Mart

 

        2022 Outpatient         Green,  MG    MG    [id number]

365 



        10:15:00 10:15:00                 Charisse N                 54      

 

        2022 Outpatient                 [facility]    [facility]    [id number]

365 [facility]



        10:30:00 10:30:00                                         55      dennis



                                                                        [place]

 

        2022 Outpatient                 [facility]    [facility]    [id number]

365 [facility]



        10:30:00 10:30:00                                         55      dennis



                                                                        [place]

 

        2022 Patient         AntionetteKateryna martinezda 1.2.840.1 [id number]

[id number] 

Methodi



        00:00:00 00:00:00 Outreach                 66831.1.1         020     st



                                                3.430.2.7                 Hospit

a



                                                .3.323529                 l



                                                .8                      

 

        2022 Office          Ekfrancis, 1.2.840.1 [id number]

0540 Methodi



        10:15:00 11:21:59 Visit           Daylin  69103.1.1         779     st



                                                3.430.2.7                 Hospit

a



                                                .3.412684                 l



                                                .8                      

 

        2022 Travel                  1.2.840.1 1.2.690.460 2074

885040 Methodi



        00:00:00 00:00:00                         63829.1.1 350.1.13.43 779     

st



                                                3.430.2.7 0.2.7.3.698         Ho

spita



                                                .3.389811 084.8           l



                                                .8                      

 

        2022 Outpatient         EKFRANCIS [facility]     [id number]

[address]



        00:00:00 00:00:00                 DAYLIN                  779     Method

i



                                                                        st

 

        2022 [facility]         Suresh Pineda 1.2.840.1 1

[id number] 

[id number]                              Methodi



        12:16:00 16:05:00 Encounter         Melissa Pickett 12907.1.1   

      492     st



                                                3.430.2.7                 Hospit

a



                                                .3.556856                 l



                                                .8                      

 

        2022 Inpatient         NIEVES [facility]     064      [place]



        00:00:00 00:00:00                 MELISSA                    492     Method

i



                                                                        st

 

        2022 Anesthesia         Naseem Willis 1.2.8

40.1 [id number] 

[id number]                              Methodi



        13:07:00 14:43:00 Event           Elaina Clark 56360.1.1         65

0     st



                                                3.430.2.7                 Hospit

a



                                                .3.291284                 l



                                                .8                      

 

        2022 Telephone         Vadim 1.2.840.1 [id number] 2100

166433 Methodi



        00:00:00 00:00:00                 Day[place]  97452.1.1         006     st



                                                3.430.2.7                 Hospit

a



                                                .3.148454                 l



                                                .8                      

 

        2022 Phone                   nullFlavo [facility] 3467

[id number] Memoria



        17:07:02 05:59:59 Message                 r       Medicine 16      l



                                                        Rudy Hines

n

 

        2022 Phone                   nullFlavo [facility] 3467

[id number] Memoria



        17:07:02 05:59:59 Message                 r       Medicine 16      l



                                                        Rudy Hines

n

 

        2022 Outpatient                 [facility]    [id number]

355 



        11:07:02 23:59:59                                         16      

 

        2022 Travel                  1.2.840.1 1.2.507.638 1236

[id number] Methodi



        00:00:00 00:00:00                         18129.1.1 350.1.13.43 996     

st



                                                3.430.2.7 0.2.7.3.698         Ho

spita



                                                .3.757442 084.8           l



                                                .8                      

 

        2022 Office          [facility],  1.2.840.1 [id number]

7744 Methodi



        16:30:00 16:34:07 Visit           Novasuesarabjit 21554.1.1         169     st



                                                3.430.2.7                 Hospit

a



                                                .3.100689                 l



                                                .8                      

 

        2022 Outpatient         [facility]     [id number]

[address] [place]



        00:00:00 00:00:00                 RAZIUDDIN                 169     Meth

farhana



                                                                        st

 

        2022 Hospital         [facility],  1.2.840.1 [id number] [id number]

31024 Methodi



        09:45:00 23:59:00 Encounter         Novamarysol 83787.1.1         979     

st



                                                3.430.2.7                 Hospit

a



                                                .3.770962                 l



                                                .8                      

 

        2022 Travel                  1.2.840.1 1.2.728.610 7919

[id number] Methodi



        00:00:00 00:00:00                         42680.1.1 350.1.13.43 961     

st



                                                3.430.2.7 0.2.7.3.698         Ho

spita



                                                .3.741395 084.8           l



                                                .8                      

 

        2022 Outpatient         [facility]     [id number]

[address] [place]



        00:00:00 00:00:00                 RAZIUDDIN                 979     Meth

farhana



                                                                        st

 

        2022 Phone                   [facility] 3467

[id number] Memoria



        19:33:53 05:59:59 Message                 r       Medicine 15      dennis martinez

 

        2022 Phone                   [facility] 3467

[id number] Memoria



        19:33:53 05:59:59 Message                 r       Medicine Dimitry martinez

 

        2022 Outpatient                 [facility]    [id number]

355 



        13:33:53 23:59:59                                         15      

 

        2021 Phone                   [facility] 3467

[id number] Memoria



        19:38:03 05:59:59 Message                 r       Medicine 14      dennis Hines

n

 

        2021 Phone                   [facility] 3467

[id number] Memoria



        19:38:03 05:59:59 Message                 r       Medicine 14      dennis Hines

n

 

        2021 Outpatient                 [facility]    [facility]    [id number]

355 



        13:38:03 23:59:59                                         14      

 

        2021 Outpatient                 [facility]

[id number] Memoria



        21:15:00 05:59:59                         r       Medicine 53      dennis Hines

n

 

        2021 Outpatient                 [facility]

[id number] Memoria



        21:15:00 05:59:59                         r       Medicine 53      dennis Hines

n

 

        2021 Outpatient         Green,  MG    [facility]    [id number]

365 



        15:15:00 23:59:59                 Charisse N                 53      

 

        2021 Outpatient                 [facility]    [id number]

365 Memoria



        15:15:00 15:15:00                                         53      dennis



                                                                        [place]

 

        2021 Office          [facility],  1.2.840.1 [id number]

2742 Methodi



        15:30:00 16:01:04 Visit           Brenna 02104.1.1         834     st



                                                3.430.2.7                 Hospit

a



                                                .3.399806                 l



                                                .8                      

 

        2021 Travel                  1.2.840.1 1.2.311.267 9246

601904 Methodi



        00:00:00 00:00:00                         61304.1.1 350.1.13.43 575     

st



                                                3.430.2.7 0.2.7.3.698         Ho

spita



                                                .3.291081 084.8           l



                                                .8                      

 

        2021 Outpatient         AHMED,  [facility]     [id number]

[address] [place]



        00:00:00 00:00:00                 BRENNA                 834     Meth

farhana



                                                                        st

 

        2021 Between                 [facility] 3467

[id number] Memoria



        14:18:24 14:18:24 Visit                   r       Medicine 62      dennis Hines

michelle

 

        2021 Between                 [facility] 3467

[id number] Memoria



        14:18:24 14:18:24 Visit                   r       Medicine 62      dennis Hines

michelle

 

        2021 Outpatient                 [facility]    [id number]

375 



        08:18:24 08:18:24                                         62      

 

        2021 Between                 [facility] 3467

[id number] Memoria



        00:56:47 00:56:47 Visit                   r       Medicine 61      dennis Hines

michelle

 

        2021 Between                 [facility] 3467

[id number] Memoria



        00:56:47 00:56:47 Visit                   r       Medicine 61      dennis Hines

michelle

 

        2021 Outpatient                 [facility]    [facility]    [id number]

375 



        18:56:47 18:56:47                                         61      

 

        2021 Outpatient                 [facility]

[id number] Memoria



        20:00:00 05:59:59                         r       Medicine 52      dennis



                                                        Grant         Donny

michelle

 

        2021 Outpatient                 [facility]

[id number] Memoria



        20:00:00 05:59:59                         r       Medicine 52      dennis



                                                        Grant         Donny

michelle

 

        2021 Outpatient         Green,  MG    MG    [id number]

365 



        14:00:00 23:59:59                 Charisse MARTINEZ                 52      

 

        2021 Outpatient                 [facility]    [facility]    [id number]

365 Memoria



        14:00:00 14:00:00                                         52      dennis Mart

 

        2021 Phone                   [facility] 3467

[id number] Memoria



        21:38:38 05:59:59 Message                 r       Medicine 13      dennis martinez

 

        2021 Phone                   [facility] 3467

[id number] Memoria



        21:38:38 05:59:59 Message                 r       Medicine 13      dennis martinez

 

        2021 Outpatient                 [facility]    [facility]    [id number]

355 



        15:38:38 23:59:59                                         13      

 

        2021 Office          Vadim, 1.2.840.1 [id number]

2744 Methodi



        11:00:00 11:44:58 Visit           Daylin  45838.1.1         513     st



                                                3.430.2.7                 Hospit

a



                                                .3.681402                 l



                                                .8                      

 

        2021 Travel                  1.2.840.1 1.2.482.741 3752

956724 Methodi



        00:00:00 00:00:00                         77299.1.1 350.1.13.43 808     

st



                                                3.430.2.7 0.2.7.3.698         Ho

spita



                                                .3.276625 084.8           l



                                                .8                      

 

        2021 Outpatient         CHANFRANCIS [facility]     [id number]

[address] [place]



        00:00:00 00:00:00                 DAYLIN                  513     Method

i



                                                                        st

 

        2021-11-15 2021 Between                 [facility] 3467

[id number] Memoria



        15:33:59 15:33:59 Visit                   r       Medicine 59      l



                                                        Grant         Donny

n

 

        2021-11-15 2021 Between                 [facility] 3467

[id number] Memoria



        15:33:59 15:33:59 Visit                   r       Medicine 59      l



                                                        Rudy Hines

n

 

        2021-11-15 2021 Outpatient                 [facility]    [id number]

375 



        09:33:59 09:33:59                                         59      

 

        2021 2021-10-05 Inpatient         INEVES, [facility]     [address]      [place]



        00:00:00 00:00:00                 MELISSA                    329     Method

i



                                                                        st

 

        2021 Outpatient         [facility] PRIV    PRIV    140

[id number] [facility]



        00:00:00 00:00:00                 n_J                     [id number] Medica

l

 

        2021 Outpatient         LAKHWINDER [facility]     2100

[id number] [place]



        00:00:00 00:00:00                 JULIANNA                 104     Method

i



                                                                        st

 

        2021 Outpatient         REYNALDO  [facility]     [id number]

[address] [place]



        00:00:00 00:00:00                 RAZIUDDIN                 273     Meth

farhana



                                                                        

 

        2021 Outpatient         EKERUO, [facility]     [id number]

[address] [place]



        00:00:00 00:00:00                 DAYLIN                  787     Method

i



                                                                        

 

        2021 Outpatient         DAOURA, [facility]     [id number]

[address] [place]



        00:00:00 00:00:00                 MAGY                 546     Method

i



                                                                        

 

        2021 Inpatient         MATHIVANAN, [facility]     064     2100

[id number] [place]



        00:00:00 00:00:00                 MELVIN                   275     Method

i



                                                                        

 

        2021 Outpatient         AHMED,  [facility]     [id number]

[address] [place]



        00:00:00 00:00:00                 RAZIUDDIN                 199     Meth

farhana



                                                                        

 

        2021 Outpatient         EKERUO, [facility]     021     [id number]

[address] [place]



        00:00:00 00:00:00                 DAYLIN                  640     Method

i



                                                                        

 

        2021 Outpatient         EKERUO, [facility]     [id number]

[address] [place]



        00:00:00 00:00:00                 DAYLIN                  435     Method

i



                                                                        

 

        2021 Outpatient         EKERUO, [facility]     [id number]

[address] [place]



        00:00:00 00:00:00                 DAYLIN                  537     Method

i



                                                                        

 

        2021 Outpatient         EKERUO, [facility]     [id number]

[address] [place]



        00:00:00 00:00:00                 DAYLIN                  709     Method

i



                                                                        

 

        2021 Inpatient         SOLIPURAM, [facility]     064     [id number]

70084 [place]



        00:00:00 00:00:00                 MELISSA                    685     Method

i



                                                                        

 

        2021 Outpatient         AHMED,  [facility]     [id number]

[address] [place]



        00:00:00 00:00:00                 RAZIUDDIN                 101     Meth

farhana



                                                                        

 

        2021 Outpatient                 [facility]

[id number] Memoria



        01:00:00 04:59:00                         uxan Mart 58      l



                                                        [place]         Meredith



 

        2021 Outpatient                 [facility]

[id number] Memoria



        01:00:00 04:59:00                         r       Barron 58      l



                                                        [place]         Meredith

nn

 

        2021 Outpatient         AHMED,  [facility]    PUL     [id number]   

 [facility]



        20:00:00 23:59:00                 RAZIUDDIN                         

 

        2021 Outpatient         Reynaldo,  [facility]    [facility]    [id number]

375 



        20:00:00 23:59:00                 Raziuddin                 58      

 

        2021 Outpatient         AHMED,  [facility]     [id number]

[address] [place]



        00:00:00 00:00:00                 RAZIUDDIN                 598     Meth

farhana



                                                                        st

 

        2021-04-15 2021 Inpatient         ANAIS, [facility]     064     2100

[id number] [place]



        00:00:00 00:00:00                 MELVIN                   060     Method

i



                                                                        st

 

        2021 Outpatient                 [facility]     [id number]

[address] [place]



        00:00:00 00:00:00                                         470     Method

i



                                                                        st

 

        2021 Outpatient                 [facility]

[id number] Memoria



        19:30:00 04:59:59                         r       Medicine 51      l



                                                        Rudy Hines

n

 

        2021 Outpatient                 [facility]

[id number] Memoria



        19:30:00 04:59:59                         r       Medicine 51      l



                                                        Rudy Hines

n

 

        2021 Outpatient         Green,  MG    MG    [id number]

365 



        14:30:00 23:59:59                 Charisse MARTINEZ                 51      

 

        2021 Outpatient                 IE    IE    [id number]

365 Memoria



        14:30:00 14:30:00                                         51      l



                                                                        Barron

 

        2021 Outpatient                 [facility]     [id number]

[address] [place] [address]



        00:00:00 00:00:00                                         398     Method

i



                                                                        st

 

        2021 Between                 [facility] 3467

[id number] Memoria



        13:38:03 13:38:03 Visit                   r       Medicine 57      l



                                                        Rudy Hines

n

 

        2021 Between                 [facility] 3467

[id number] Memoria



        13:38:03 13:38:03 Visit                   r       Medicine 57      l



                                                        Rudy Hines

n

 

        2021 Outpatient                 [facility]    [facility]    [id number]

375 



        08:38:03 08:38:03                                         57      

 

        2021 Outpatient         REYNALDO,  [facility]     [id number]

[address] [place]



        00:00:00 00:00:00                 RAZIUDDIN                 554     Meth

farhana



                                                                        

 

        2021 Inpatient         ANAIS [facility]     025     

[id number] [place]



        00:00:00 00:00:00                 MELVIN                   541     Method

i



                                                                        

 

        2021 Inpatient         ANAIS [facility]     Ayse     2100

[id number] [place]



        00:00:00 00:00:00                 MELVIN                   559     Method

i



                                                                        

 

        2020 Inpatient         ANAIS [facility]     012     

[id number] [place]



        00:00:00 00:00:00                 MELVIN                   271     Method

i



                                                                        

 

        2020 Outpatient                 [facility]

[id number] Memoria



        16:30:00 05:59:59                         r       Medicine 50      l



                                                        Rudy Hines

n

 

        2020 Outpatient                 [facility]

[id number] Memoria



        16:30:00 05:59:59                         r       Medicine 50      l



                                                        Rudy Hines

n

 

        2020 Outpatient         Green,  [facility]    MG    [id number]

365 



        10:30:00 23:59:59                 Charisse N                 50      

 

        2020 Outpatient                 [facility]    [facility]    [id number]

365 Memoria



        10:30:00 10:30:00                                         50      l



                                                                        [place]

 

        2020 Inpatient         SOLIPURAM, [facility]     012     [id number]

35532 [place]



        00:00:00 00:00:00                 MELISSA                    464     Method

i



                                                                        

 

        2020-10-23 2020 Inpatient         SOLIPURAM, [facility]     012     [id number]

64436 [place]



        00:00:00 00:00:00                 MELISSA                    557     Method

i



                                                                        

 

        2020-10-23 2020-10-24 Outpt Diag                 [facility]    [id number]

13524 Memoria



        18:10:00 04:59:00 Services                 r       Outpatient 06      l



                                                        Imaging         [place]



                                                        [place]         

 

        2020-10-23 2020-10-24 Outpt Diag                 [facility]    [id number]

95157 Memoria



        18:10:00 04:59:00 Services                 r       Outpatient 06      l



                                                        Imaging         [place]



                                                        [place]         

 

        2020-10-23 2020-10-23 Outpatient         Jazz [facility]    [facility]    [id number]

7385 



        13:10:00 23:59:00                 Abdi MARTINEZ                 06      

 

        2020-10-21 2020-10-22 Between                 [facility] 3467

[id number] Memoria



        17:58:19 17:58:19 Visit                   r       Medicine 56      l



                                                        Rudy Hines

n

 

        2020-10-21 2020-10-22 Between                 [facility] 3467

[id number] Memoria



        17:58:19 17:58:19 Visit                   r       Medicine 56      l



                                                        Rudy Hines

n

 

        2020-10-21 2020-10-22 Outpatient                 [facility]    [facility]    [id number]

375 



        12:58:19 12:58:19                                         56      

 

        2020-10-13 2020-10-14 Outpatient                 [facility]

[id number] Memoria



        15:15:00 04:59:59                         r       Medicine 49      l



                                                        Rudy Hines

n

 

        2020-10-13 2020-10-14 Outpatient                 [facility]

[id number] Memoria



        15:15:00 04:59:59                         r       Medicine 49      l



                                                        Rudy Hines

n

 

        2020-10-13 2020-10-13 Outpatient         Green,  MG    [facility]    [id number]

365 



        10:15:00 23:59:59                 Charisse MARTINEZ                 49      

 

        2020-10-13 2020-10-13 Outpatient                 [facility]    [id number]

365 Memoria



        10:15:00 10:15:00                                         49      l



                                                                        [place]

 

        2020 Outpt Diag                 [facility]    [id number]

33252 Memoria



        17:02:00 04:59:00 Services                 r       Outpatient 05      l



                                                        Imaging         [place]



                                                        [place]         

 

        2020 Outpt Diag                 [facility]    [id number]

91149 Memoria



        17:02:00 04:59:00 Services                 r       Outpatient 05      l



                                                        Imaging         [place]



                                                        [place]         

 

        2020 Outpatient         Jazz, [facility]    29    [id number]

7385 



        12:02:00 23:59:00                 Abdi MARTINEZ                 05      

 

        2020 Ambulatory                 [facility]    [id number]

[id number] Memoria



        19:00:00 19:00:00 Pre-Reg                 r       Nephrology 46      dennis martinez

 

        2020 Ambulatory                 [facility]    [id number]

40461 Memoria



        19:00:00 19:00:00 Pre-Reg                 r       Nephrology 46      l



                                                        Rudy martinez

 

        2020 Outpatient                 IE    IE    [id number]

365 [facility]



        14:00:00 14:00:00                                         46      dennis Mart

 

        2020 Outpatient         [facility]    346

[id number] 



        14:00:00 14:00:00                 Dacalyssia,                   46      



                                        Gabriela CLARK                         

 

        2020 Outpatient                 [facility]    [facility]    [id number]

365 [facility]



        11:15:00 11:15:00                                         47      dennis



                                                                        [place]

 

        2020 Outpatient                 IE    IE    [id number]

365 Memoria



        11:15:00 11:15:00                                         47      dennis



                                                                        [place]

 

        2020 Outpatient                 [facility]Flavo [facility]    [id number]

[id number] Memoria



        19:45:00 04:59:59                         r       Nephrology 48      l



                                                        Rudy martinez

 

        2020 Outpatient                 [facility]    [id number]

83122 Memoria



        19:45:00 04:59:59                         r       Nephrology 48      l



                                                        Rudy Hines

michelle

 

        2020 Outpatient         [facility]    346

[id number] 



        14:45:00 23:59:59                 Dacalyssia                   48      



                                        Gabriela CLARK                         

 

        2020 Outpatient                 IE    IE    [id number]

365 [facility]



        14:45:00 14:45:00                                         48      dennis



                                                                        Barron

 

        2020 Outpatient         [facility], [facility]     [id number]

[address] [place] [address]



        00:00:00 00:00:00                 EMILIA                    832     Method

i



                                                                        st

 

        2020 Phone                   nullFlavo [facility]    [id number]

55 Memoria



        16:17:50 04:59:59 Message                 r       Nephrology 12      dennis Hines

michelle

 

        2020 Phone                   nullFlavo [facility]    [id number]

55 [facility]oria



        16:17:50 04:59:59 Message                 r       Nephrology 12      l



                                                        Rudy Hines

n

 

        2020 Outpatient                 [facility]    MG    [id number]

355 



        11:17:50 23:59:59                                         12      

 

        2020 Outpatient                 nullFlavo MG    [id number]

[id number] Memoria



        19:00:00 04:59:59                         r       Nephrology 41      l



                                                        Rudy Hines

n

 

        2020 Outpatient                 nullFlavo MG    [id number]

[id number] Memoria



        19:00:00 04:59:59                         r       Nephrology 41      l



                                                        Rudy Hines

n

 

        2020 Outpatient         [place]    MG    [id number]

[id number] 



        14:00:00 23:59:59                 Daca,                   41      



                                        Gabriela EDUARDO                         

 

        2020 Outpatient                 [facility]    IE    [id number]

365 [facility]



        14:00:00 14:00:00                                         41      dennis



                                                                        Barron

 

        2020 Phone                   nullFlavo MG    [id number]

55 [facility]



        18:35:51 04:59:59 Message                 r       Nephrology 11      dennis Mart

 

        2020 Phone                   nullFlavo MG    [id number]

55 [facility]



        18:35:51 04:59:59 Message                 r       Nephrology 11      dennis Mart

 

        2020 Outpatient                 MG    MG    [id number]

355 



        13:35:51 23:59:59                                         11      

 

        2020 Outpatient                 nullFlavo [facility] 3

[id number] Memoria



        15:15:00 04:59:59                         r       Medicine 45      l



                                                        Rudy Hines

n

 

        2020 Outpatient                 nullFlavo [facility]

[id number] Memoria



        15:15:00 04:59:59                         r       Medicine 45      l



                                                        Rudy Hines

n

 

        2020 Outpatient         Green,  MG    MG    [id number]

365 



        10:15:00 23:59:59                 Charisse MARTINEZ                 45      

 

        2020 Ambulatory                 nullFlavo [facility]

[id number] Memoria



        15:15:00 15:15:00 Pre-Reg                 r       Medicine 44      l



                                                        Rudy Hines

n

 

        2020 Ambulatory                 nullFlavo [facility] 3

[id number] Memoria



        15:15:00 15:15:00 Pre-Reg                 r       Medicine 44      l



                                                        Rudy Hines

michelle

 

        2020 Outpatient                 [facility]    [facility]    [id number]

365 [facility]



        10:15:00 10:15:00                                         45      dennis



                                                                        Barron

 

        2020 Outpatient                 [facility]    [facility]    [id number]

365 [facility]



        10:15:00 10:15:00                                         44      dennis Mart

 

        2020 Outpatient         Green,  MG    MG    [id number]

365 



        10:15:00 10:15:00                 Charisse N                 44      

 

        2020 Phone                   nullFlavo MG    [id number]

55 Memoria



        13:23:32 04:59:59 Message                 r       Nephrology 10      dennis Rosariosylvie martinez

 

        2020 Phone                   nullFlavo MG    [id number]

55 [facility]oria



        13:23:32 04:59:59 Message                 r       Nephrology 10      dennis Grant         Donny martinez

 

        2020 Outpatient                 [facility]    MG    [id number]

355 



        08:23:32 23:59:59                                         10      

 

        2020 Between                 nullFlavo [facility] 3467

[id number] Memoria



        14:14:54 14:14:54 Visit                   r       Medicine 52      l



                                                        Rudy Rosariosylvie martinez

 

        2020 Between                 nullFlavo [facility] 3467

[id number] Memoria



        14:14:54 14:14:54 Visit                   r       Medicine 52      l



                                                        Grant         Donny martinez

 

        2020 Outpatient                 [facility]    MG    [id number]

375 



        09:14:54 09:14:54                                         52      

 

        2020 Outpatient                 [facility]    IE    [id number]

365 [facility]



        11:30:00 11:30:00                                         43      dennis Mart

 

        2020 Outpatient                 [facility]    [facility]    [id number]

365 [facility]



        11:30:00 11:30:00                                         43      dennis Matr

 

        2020 2020-04-15 Phone                   nullFlavo [facility] 3467

[id number] Memoria



        20:00:15 04:59:59 Message                 r       Medicine 09      l



                                                        Grant         Donny martinez

 

        2020 2020-04-15 Phone                   nullFlavo [facility] 3467

[id number] Memoria



        20:00:15 04:59:59 Message                 r       Medicine 09      dennis Hines

n

 

        2020 Outpatient                 [facility]    [facility]    [id number]

355 



        15:00:15 23:59:59                                         09      

 

        2020-04-10 2020 Phone                   nullFlavo [facility] 3467

[id number] Memoria



        17:18:28 04:59:59 Message                 r       Medicine 08      dennis Hines

n

 

        2020-04-10 2020 Phone                   nullFlavo [facility] 3467

[id number] Memoria



        17:18:28 04:59:59 Message                 r       Medicine 08      dennis Hines

michelle

 

        2020-04-10 2020 Phone                   nullFlavo [facility]    [id number]

55 [facility]oria



        13:26:55 04:59:59 Message                 r       Nephrology 07      dennis Hines

michelle

 

        2020-04-10 2020 Phone                   nullFlavo MG    [id number]

55 [facility]oria



        13:26:55 04:59:59 Message                 r       Nephrology 07      dennis Hines

michelle

 

        2020-04-10 2020 Outpatient                 [facility]    [facility]    [id number]

355 



        12:18:28 23:59:59                                         08      

 

        2020-04-10 2020 Outpatient                 [facility]    [facility]    [id number]

355 



        08:26:55 23:59:59                                         07      

 

        2020 Outpatient                 nullFlavo [facility] 3

[id number] Memoria



        20:15:00 05:59:59                         r       Medicine 42      l



                                                        Rudy Hines

michelle

 

        2020 Outpatient                 nullFlavo [facility] Family 3

[id number] Memoria



        20:15:00 05:59:59                         r       Medicine 42      l



                                                        Rudy Hines

michelle

 

        2020 Outpatient         Green,  [facility]    MG    [id number]

365 



        14:15:00 23:59:59                 Charisse MARTINEZ                 42      

 

        2020 Outpatient                 [facility]    [facility]    [id number]

365 [facility]



        14:15:00 14:15:00                                         Gildardo Mart

 

        2020 Phone                   nullFlavo [facility] 3467

[id number] Memoria



        14:22:27 05:59:59 Message                 r       Medicine 06      dennis martinez

 

        2020 Phone                   nullFlavo [facility] 3467

[id number] Memoria



        14:22:27 05:59:59 Message                 r       Medicine 06      dennis martinez

 

        2020 Outpatient                 [facility]    [facility]    [id number]

355 



        08:22:27 23:59:59                                         06      

 

        2019 Phone                   nullFlavo [facility] 3467

[id number] Memoria



        16:06:04 05:59:59 Message                 r       Medicine 05      dennis martinez

 

        2019 Phone                   nullFlavo [facility] 3467

[id number] Memoria



        16:06:04 05:59:59 Message                 r       Medicine Fran martinez

 

        2019 Outpatient                 [facility]    [facility]    [id number]

355 



        10:06:04 23:59:59                                         05      

 

        2019 Between                 nullFlavo MG    [id number]

75 Memoria



        20:05:03 20:05:03 Visit                   r       Nephrology 45      dennis Mart

 

        2019 Between                 nullFlavo MG    [id number]

75 [facility]oria



        20:05:03 20:05:03 Visit                   r       Nephrology 45      dennis Mart

 

        2019 Outpatient                 [facility]    [facility]    [id number]

375 



        14:05:03 14:05:03                                         45      

 

        2019 Outpatient                 nullFlavo MG    [id number]

[id number] Memoria



        20:45:00 05:59:59                         r       Nephrology 38      dennis martinez

 

        2019 Outpatient                 nullFlavo MG    [id number]

[id number] Memoria



        20:45:00 05:59:59                         r       Nephrology 38      dennis Hines

michelle

 

        2019 Outpatient         [facility]    [facility]    346

[id number] 



        14:45:00 23:59:59                 Brianda Gallegos                         

 

        2019 Outpatient                 [facility]    [facility]    [id number]

365 Memoria



        14:45:00 14:45:00                                         38      dennis Mart

 

        2019 Between                 nullFlavo MG    [id number]

75 Memoria



        00:07:10 00:07:10 Visit                   r       Nephrology 43      dennis Mart

 

        2019 Between                 nullFlavo MG    [id number]

75 Memoria



        00:07:10 00:07:10 Visit                   r       Nephrology 43      dennis Mart

 

        2019 Outpatient                 MG    MG    [id number]

375 



        18:07:10 18:07:10                                         43      

 

        2019 Outpatient                 [facility]    IE    [id number]

365 [facility]



        09:00:00 09:00:00                                         39      dennis Mart

 

        2019 Outpatient                 [facility]    IE    [id number]

365 [facility]



        09:00:00 09:00:00                                         39      dennis Mart

 

        2019 Between                 nullFlavo [facility] 3467

[id number] Memoria



        21:47:12 21:47:12 Visit                   r       Medicine 41      dennis Hines

michelle

 

        2019 Between                 nullFlavo [facility] 3467

[id number] Memoria



        21:47:12 21:47:12 Visit                   r       Medicine 41      dennis Hines

michelle

 

        2019 Outpatient                 MG    MG    [id number]

375 



        15:47:12 15:47:12                                         41      

 

        2019-10-29 2019-10-30 Between                 nullFlavo [facility] 3467

[id number] Memoria



        22:13:13 22:13:13 Visit                   r       Medicine 40      dennis Hines

michelle

 

        2019-10-29 2019-10-30 Between                 nullFlavo [facility] 3467

[id number] Memoria



        22:13:13 22:13:13 Visit                   r       Medicine 40      dennis Hines

michelle

 

        2019-10-29 2019-10-30 Outpatient                 MG    MG    [id number]

375 



        17:13:13 17:13:13                                         40      

 

        2019-10-25 2019-10-26 Outpatient                 nullFlavo [facility] 3

[id number] Memoria



        14:45:00 04:59:59                         r       Medicine 40      dennis Hines

michelle

 

        2019-10-25 2019-10-26 Outpatient                 nullFlavo [facility]

[id number] Memoria



        14:45:00 04:59:59                         r       Medicine 40      l



                                                        Grant         Donny martinez

 

        2019-10-25 2019-10-25 Outpatient         Green,  MG    MG    [id number]

365 



        09:45:00 23:59:59                 Charisse N                 40      

 

        2019-10-25 2019-10-25 Outpatient                 [facility]    [facility]    [id number]

365 [facility]



        09:45:00 09:45:00                                         40      dennis



                                                                        Barron

 

        2019-10-17 2019-10-17 Ambulatory                 nullFlavo [facility]

[id number] Memoria



        16:00:00 16:00:00 Pre-Reg                 r       Medicine 36      dennis Hines

n

 

        2019-10-17 2019-10-17 Ambulatory                 nullFlavo [facility]

[id number] Memoria



        16:00:00 16:00:00 Pre-Reg                 r       Medicine 36      dennis Hines

n

 

        2019-10-17 2019-10-17 Outpatient                 [facility]    [facility]    [id number]

365 [facility]



        11:00:00 11:00:00                                         36      dennis



                                                                        Barron

 

        2019-10-17 2019-10-17 Outpatient         Green,  MG    MG    [id number]

365 



        11:00:00 11:00:00                 Charisse N                 36      

 

        2019-10-10 2019-10-11 Outpatient                 nullFlavo MG    [id number]

[id number] Memoria



        15:00:00 04:59:59                         r       Nephrology 35      dennis iHnes

n

 

        2019-10-10 2019-10-11 Outpatient                 nullFlavo MG    [id number]

[id number] Memoria



        15:00:00 04:59:59                         r       Nephrology 35      dennis Hines

n

 

        2019-10-10 2019-10-10 Outpatient         Green,  MG    MG    [id number]

365 



        10:00:00 23:59:59                 Charisse N                 35      

 

        2019-10-10 2019-10-10 Outpatient                 [facility]    [facility]    [id number]

365 [facility]oria



        12:00:00 12:00:00                                         37      dennis Mart

 

        2019-10-10 2019-10-10 Outpatient                 [facility]    [facility]    [id number]

365 [facility]oria



        12:00:00 12:00:00                                         37      dennis Mart

 

        2019-10-10 2019-10-10 Outpatient                 [facility]    [facility]    [id number]

365 [facility]oria



        10:00:00 10:00:00                                         35      dennis Mart

 

        2019 Phone                   nullFlavo [facility] 3467

[id number] Memoria



        15:15:06 04:59:59 Message                 r       Medicine 04      dennis Hines

n

 

        2019 Phone                   nullFlavo [facility] 3467

[id number] Memoria



        15:15:06 04:59:59 Message                 r       Medicine 04      dennis martinez

 

        2019 Phone                   nullFlavo MG    [id number]

55 Memoria



        15:10:51 04:59:59 Message                 r       Nephrology 03      dennis martinez

 

        2019 Phone                   nullFlavo MG    [id number]

55 Memoria



        15:10:51 04:59:59 Message                 r       Nephrology 03      dennis martinez

 

        2019 Outpatient                 MG    MG    [id number]

355 



        10:15:06 23:59:59                                         04      

 

        2019 Outpatient                 MG    MG    [id number]

355 



        10:10:51 23:59:59                                         03      

 

        2019 Ambulatory                 nullFlavo MG    [id number]

[id number] Memoria



        20:00:00 20:00:00 Pre-Reg                 r       Nephrology 34      dennis martinez

 

        2019 Ambulatory                 nullFlavo MG    [id number]

[id number] Memoria



        20:00:00 20:00:00 Pre-Reg                 r       Nephrology 34      dennis martinez

 

        2019 Outpatient                 [facility]    [id number]

365 Memoria



        15:00:00 15:00:00                                         34      dennis Mart

 

        2019 Outpatient         [place] MG    [facility]    346

[id number] 



        15:00:00 15:00:00                 Tate Gallegos                         

 

        2019 Between                 nullFlavo MG    [id number]

75 Memoria



        20:15:16 20:15:16 Visit                   r       Nephrology 34      dennis Mart

 

        2019 Between                 nullFlavo MG    [id number]

75 [facility]oria



        20:15:16 20:15:16 Visit                   r       Nephrology 34      dennis Mart

 

        2019 Outpatient                 MG    MG    [id number]

375 



        15:15:16 15:15:16                                         34      

 

        2019 Phone                   nullFlavo MG    [id number]

55 [facility]oria



        15:29:54 04:59:59 Message                 r       Nephrology 02      dennis Mart

 

        2019 Phone                   nullFlavo MG    [id number]

55 Memoria



        15:29:54 04:59:59 Message                 r       Nephrology 02      dennis Mart

 

        2019 Outpatient                 [facility]    MG    [id number]

355 



        10:29:54 23:59:59                                         02      

 

        2019 Ambulatory                 nullFlavo MG    [id number]

[id number] Memoria



        16:30:00 16:30:00 Pre-Reg                 r       Nephrology 29      dennis martinez

 

        2019 Ambulatory                 nullFlavo MG    [id number]

[id number] Memoria



        16:30:00 16:30:00 Pre-Reg                 r       Nephrology 29      dennis martinez

 

        2019 Ambulatory                 nullFlavo MG    [id number]

[id number] Memoria



        16:15:00 16:15:00 Pre-Reg                 r       Nephrology 30      dennis martinez

 

        2019 Ambulatory                 nullFlavo MG    [id number]

[id number] Memoria



        16:15:00 16:15:00 Pre-Reg                 r       Nephrology 30      dennis martinez

 

        2019 Ambulatory                 nullFlavo [facility]

[id number] Memoria



        15:15:00 15:15:00 Pre-Reg                 r       Medicine 31      dennis martinez

 

        2019 Ambulatory                 nullFlavo [facility]

[id number] Memoria



        15:15:00 15:15:00 Pre-Reg                 r       Medicine 31      dennis martinez

 

        2019 Outpatient                 [facility]    IE    [id number]

365 [facility]oria



        11:30:00 11:30:00                                         29      dennis Mart

 

        2019 Outpatient         [place] MG    MG    [id number]

[id number] 



        11:30:00 11:30:00                 Marco A Gallegos                         

 

        2019 Outpatient                 [facility]    [facility]    [id number]

365 [facility]



        11:15:00 11:15:00                                         30      dennis Rosarioann

 

        2019 Outpatient         [place]    MG    [id number]

[id number] 



        11:15:00 11:15:00                 Jamil Gallegosbieta J                         

 

        2019 Outpatient                 [facility]    [facility]    [id number]

365 [facility]



        10:15:00 10:15:00                                         31      dennis Mart

 

        2019 Outpatient         Green,  MG    [facility]    [id number]

365 



        10:15:00 10:15:00                 Charisse MARTINEZ                 31      

 

        2019 Inpatient PABLITO ADEN   [facility]     TELE    [id number]

69 [place]



        10:05:00 17:55:00                 GOPAL                         [facility]

[facility]

 

        2019 Outpatient PABLITO ADEN   [facility]     TELE    [id number]

427 [place]



        21:36:00 19:00:00                 GOPAL                         [facility]

[facility]

 

        2019 Outpatient                 nullFlavo [facility] 346

[id number] Memoria



        20:40:00 04:59:59                         r       Care    33      l



                                                        [facility]

 

        2019 Outpatient                 nullFlavo [facility] 346

[id number] Memoria



        20:40:00 04:59:59                         r       Care    33      l



                                                        Candace         [place]

 

        2019 Outpatient         Van     [facility]    [facility]    [id number]

365 



        15:40:00 23:59:59                 Mitch Brooke      



                                        Yousif kirkpatrick                         



                                        Escobar                           

 

        2019 Outpatient                 [facility]    [facility]    [id number]

365 [facility]



        15:40:00 15:40:00                                         33      dennis Mart

 

        2019 Outpatient                 nullFlavo [facility]

[id number] Memoria



        15:15:00 04:59:59                         r       Medicine 32      l



                                                        Rudy martinez

 

        2019 Outpatient                 nullFlavo [facility]

[id number] Memoria



        15:15:00 04:59:59                         r       Medicine 32      l



                                                        Rudy martinez

 

        2019 Outpatient         [place]    [facility]    [id number]

[id number] 



        10:15:00 23:59:59                 DacKristie cade                         

 

        2019 Outpatient                 [facility]    [facility]    [id number]

365 [facility]



        10:15:00 10:15:00                                         Kristie Mart

 

        2019 Between                 nullFlavo [facility] 3467

[id number] Memoria



        19:00:16 19:00:16 Visit                   r       Medicine 29      dennis Hines

n

 

        2019 Between                 nullFlavo [facility] 3467

[id number] Memoria



        19:00:16 19:00:16 Visit                   r       Medicine 29      dennis Hines

n

 

        2019 Outpatient                 MG    MG    [id number]

375 



        14:00:16 14:00:16                                         29      

 

        2019 2019-03-15 Between                 nullFlavo MG    [id number]

75 [facility]



        15:02:37 15:02:37 Visit                   r       Nephrology 27      dennis Hines

n

 

        2019 2019-03-15 Between                 nullFlavo MG    [id number]

75 [facility]



        15:02:37 15:02:37 Visit                   r       Nephrology 27      dennis Hines

n

 

        2019 2019-03-15 Outpatient                 MG    MG    [id number]

375 



        10:02:37 10:02:37                                         27      

 

        2019 2019-03-15 Outpatient                 nullFlavo MG    [id number]

[id number] [facility]oria



        18:45:00 04:59:59                         r       Nephrology 28      dennis Hines

n

 

        2019 2019-03-15 Outpatient                 nullFlavo MG    [id number]

[id number] [facility]



        18:45:00 04:59:59                         r       Nephrology 28      dennis Hines

n

 

        2019 2019-03-15 Outpatient                 nullFlavo [facility] 3

[id number] Memoria



        14:15:00 04:59:59                         r       Medicine 22      dennis Hines

n

 

        2019 2019-03-15 Outpatient                 nullFlavo [facility] Family 3

[id number] Memoria



        14:15:00 04:59:59                         r       Medicine 22      dennis Hines

n

 

        2019 Outpatient         [place] [facility]    MG    [id number]

[id number] 



        13:45:00 23:59:59                 Husam Gallegos                         

 

        2019 Outpatient         [place],  MG    MG    [id number]

365 



        09:15:00 23:59:59                 Charisse MARTINEZ                 22      

 

        2019 Outpatient                 [facility]    [facility]    [id number]

365 Memoria



        13:45:00 13:45:00                                         28      dennis Rosarioann

 

        2019 Outpatient                 [facility]    [id number]

365 Memoria



        09:15:00 09:15:00                                         22      dennis



                                                                        Barron

 

        2019 Between                 nullFlavo MG    [id number]

75 Memoria



        23:59:47 23:59:47 Visit                   r       Nephrology 26      dennis Hines

michelle

 

        2019 Between                 nullFlavo MG    [id number]

75 Memoria



        23:59:47 23:59:47 Visit                   r       Nephrology 26      dennis Hines

michelle

 

        2019 Outpatient                 [facility]    [id number]

375 



        18:59:47 18:59:47                                         26      

 

        2019 Between                 nullFlavo MG    [id number]

75 Memoria



        22:00:33 22:00:33 Visit                   r       Nephrology 24      dennis Hines

michelle

 

        2019 Between                 nullFlavo MG    [id number]

75 Memoria



        22:00:33 22:00:33 Visit                   r       Nephrology 24      dennis Hines

michelle

 

        2019 Outpatient                 [facility]    [id number]

375 



        16:00:33 16:00:33                                         24      

 

        2019 Between                 nullFlavo MG    [id number]

75 Memoria



        04:48:44 04:48:44 Visit                   r       Nephrology 23      dennis Hines

michelle

 

        2019 Between                 nullFlavo MG    [id number]

75 Memoria



        04:48:44 04:48:44 Visit                   r       Nephrology 23      dennis Hines

michelle

 

        2019 Outpatient                 [facility]    [id number]

375 



        22:48:44 22:48:44                                         23      

 

        2019 Ambulatory                 nullFlavo MG    [id number]

[id number] Memoria



        20:30:00 20:30:00 Pre-Reg                 r       Nephrology 27      dennis Hines

michelle

 

        2019 Ambulatory                 nullFlavo MG    [id number]

[id number] Memoria



        20:30:00 20:30:00 Pre-Reg                 r       Nephrology 27      dennis Hines

michelle

 

        2019 Ambulatory                 nullFlavo MG    [id number]

[id number] Memoria



        18:40:00 18:40:00 Pre-Reg                 r       Nephrology 24      dennis martinez

 

        2019 Ambulatory                 nullFlavo MG    [id number]

24073 Memoria



        18:40:00 18:40:00 Pre-Reg                 r       Nephrology 24      dennis martinez

 

        2019 Outpatient                 [facility]    [facility]    [id number]

365 Memoria



        14:30:00 14:30:00                                         27      dennis



                                                                        Barron

 

        2019 [facility]

[id number] 



        14:30:00 14:30:00                 Zuleyma Gallegos                         

 

        2019 [facility]

[id number] 



        14:30:00 14:30:00                 Zuleyma Gallegos                         

 

        2019 Outpatient                 IE    IE    [id number]

365 Memoria



        12:40:00 12:40:00                                         24      dennis Mart

 

        2019 Outpatient         [facility]    346

[id number] 



        12:40:00 12:40:00                 Michael Gallegos                         

 

        2019 Outpatient         [facility]

[id number] 



        12:40:00 12:40:00                 Michael Gallegos                         

 

        2019 Ambulatory                 nullFlavo MG    [id number]

[id number] Memoria



        15:30:00 15:30:00 Pre-Reg                 r       Internal 25      l



                                                        Medicine         Barron Grant         

 

        2019 Ambulatory                 nullFlavo MG    [id number]

[id number] Memoria



        15:30:00 15:30:00 Pre-Reg                 r       Internal 25      l



                                                        Manuel Grant         

 

        2019 Outpatient                 MHIE    IE    [id number]

365 Memoria



        09:30:00 09:30:00                                         25      dennis Mart

 

        2019 Outpatient                 MG    MG    [id number]

365 



        09:30:00 09:30:00                                         25      

 

        2018 2018-10-20 Ambulatory                 nullFlavo MG    [id number]

[id number] Memoria



        12:45:00 12:45:00 Pre-Reg                 r       Internal 23      dennis Grant         

 

        2018 2018-10-20 Ambulatory                 nullFlavo MG    [id number]

[id number] Memoria



        12:45:00 12:45:00 Pre-Reg                 r       Internal 23      dennis Grant         

 

        2018 2018-10-20 Outpatient                 MG    MG    [id number]

365 



        07:45:00 07:45:00                                         23      

 

        2018 Outpatient                 nullFlavo MG    [id number]

[id number] Memoria



        19:15:00 04:59:59                         r       Nephrology 26      dennis martinez

 

        2018 Outpatient                 nullFlavo MG    [id number]

[id number] Memoria



        19:15:00 04:59:59                         r       Nephrology 26      dennis martinez

 

        2018 Outpatient                 nullFlavo MG    [id number]

[id number] Memoria



        18:00:00 04:59:59                         r       Nephrology 21      dennis Hines

michelle

 

        2018 Outpatient                 nullFlavo MG    [id number]

[id number] Memoria



        18:00:00 04:59:59                         r       Nephrology 21      dennis martinez

 

        2018 Outpatient         [place]    346

[id number] 



        14:15:00 23:59:59                 Daca,                   Tay      



                                        Gabriela EDUARDO                         

 

        2018 Outpatient         [place]    MG    346

[id number] 



        13:00:00 23:59:59                 Daca,                   21      



                                        Gabriela EDUARDO                         

 

        2018 Outpatient                 IE    IE    [id number]

365 Memoria



        14:15:00 14:15:00                                         26      dennis Mart

 

        2018 Outpatient                 MHIE    IE    [id number]

365 Memoria



        13:00:00 13:00:00                                         21      dennis



                                                                        Barron

 

        2018 Outpatient                 MHIE    IE    [id number]

365 Memoria



        07:45:00 07:45:00                                         23      dennis



                                                                        Barron

 

        2018 Outpatient                 nullFlavo [facility] Family 3

[id number] Memoria



        18:30:00 04:59:59                         r       Medicine 20      dennis Hines

michelle

 

        2018 Outpatient                 nullFlavo MG Family 3

[id number] Memoria



        18:30:00 04:59:59                         r       Medicine 20      dennis Hines

n

 

        2018 Outpatient         [place],  MG    MG    [id number]

365 



        13:30:00 23:59:59                 Charisse N                 20      

 

        2018 Outpatient                 MHIE    MHIE    [id number]

365 Memoria



        13:30:00 13:30:00                                         20      dennis Mart

 

        2018 Outpatient                 nullFlavo MG    [id number]

[id number] Memoria



        16:00:00 04:59:59                         r       Nephrology 19      dennis Hines

n

 

        2018 Outpatient                 nullFlavo MG    [id number]

[id number] Memoria



        16:00:00 04:59:59                         r       Nephrology 19      dennis Hines

michelle

 

        2018 Outpatient         [place]    MG    346

[id number] 



        11:00:00 23:59:59                 Daca,                   Toney CLARK                         

 

        2018 [facility]         [facility]

[id number] 



        11:00:00 23:59:59                 Daca,                   Toney Chaeta EDUARDO                         

 

        2018 Outpatient                 MHIE    IE    [id number]

365 Memoria



        11:00:00 11:00:00                                         19      dennis Mart

 

        2018 Outpatient                 nullFlavo MG Family 3

[id number] Memoria



        15:00:00 04:59:59                         r       Medicine 18      dennis Hines

michelle

 

        2018 Outpatient                 nullFlavo MG Family 3

[id number] Memoria



        15:00:00 04:59:59                         r       Medicine 18      dennis Hines

n

 

        2018 Outpatient         Green,  MG    MG    [id number]

365 



        10:00:00 23:59:59                 Charisse N                 18      

 

        2018 Outpatient         [facility],  [facility]    MG    [id number]

365 



        10:00:00 23:59:59                 Charisse N                 18      

 

        2018 Outpatient                 MHIE    IE    [id number]

365 Memoria



        10:00:00 10:00:00                                         18      dennis Mart

 

        2017 Outpatient                 MHIE    MHIE    [id number]

365 [facility]oria



        10:40:00 10:40:00                                         16      dennis



                                                                        Barron

 

        2017 Outpatient                 MHIE    MHIE    [id number]

365 Memoria



        10:40:00 10:40:00                                         16      dennis



                                                                        Barron

 

        2017 Outpatient                 MHIE    MHIE    [id number]

365 Memoria



        11:30:00 11:30:00                                         17      dennis



                                                                        Barron

 

        2017 Outpatient                 MHIE    MHIE    [id number]

365 Memoria



        11:30:00 11:30:00                                         17      dennis



                                                                        Barron

 

        2017 Outpatient                 MHIE    [facility]IE    [id number]

365 Memoria



        11:40:00 11:40:00                                         11      dennis



                                                                        Barron

 

        2017 Outpatient                 MHIE    MHIE    [id number]

365 Memoria



        11:40:00 11:40:00                                         11      dennis



                                                                        Barron

 

        2017 Outpatient                 MHIE    MHIE    [id number]

365 [facility]oria



        14:30:00 14:30:00                                         15      dennis



                                                                        Barron

 

        2017 Outpatient                 MHIE    MHIE    [id number]

365 [facility]oria



        14:30:00 14:30:00                                         15      dennis



                                                                        Barron

 

        2017 Outpatient                 MHIE    MHIE    [id number]

365 Memoria



        10:00:00 10:00:00                                         08      dennis



                                                                        Barron

 

        2017 Outpatient                 MHIE    MHIE    [id number]

365 [facility]oria



        10:00:00 10:00:00                                         08      dennis Mart

 

        2017 Bedded                  [facility] [id number]

375 [facility]



        11:48:35 14:30:00 Outpatient                 r       Barron 13      l



                                                        [place]         Meredith

nn

 

        2017 Bedded                  [facility] [id number]

375 Memoria



        11:48:35 14:30:00 Outpatient                 xuan Mart 13      l



                                                        [place]         Meredith

nn

 

        2017 Outpatient         CHELSEY CunninghamSL    [facility]    [id number]

[id number] 



        05:48:35 08:30:00                 Randy Velasco      

 

        2017 Outpatient                 MHIE    [facility]    [id number]

365 [facility]oria



        13:15:00 13:15:00                                         14      dennis



                                                                        Barron

 

        2017 Outpatient                 MHIE    IE    [id number]

365 Memoria



        13:15:00 13:15:00                                         14      dennis



                                                                        Barron

 

        2016 Outpatient                 MHIE    [facility]IE    [id number]

365 Memoria



        09:30:00 09:30:00                                         12      dennis



                                                                        Barron

 

        2016 Outpatient                 MHIE    [facility]IE    [id number]

365 Memoria



        09:30:00 09:30:00                                         13      dennis



                                                                        Barron

 

        2016 Outpatient                 MHIE    [facility]    [id number]

365 [facility]oria



        09:30:00 09:30:00                                         12      dennis



                                                                        Barron

 

        2016 Outpatient                 MHIE    MHIE    [id number]

365 Memoria



        09:30:00 09:30:00                                         13      dennis



                                                                        Barron

 

        2016 Outpatient                 MHIE    [facility]    [id number]

365 Memoria



        10:20:00 10:20:00                                         09      dennis Mart

 

        2016 Outpatient                 MHIE    IE    [id number]

365 Memoria



        10:20:00 10:20:00                                         09      dennis



                                                                        Barron

 

        2016 Outpatient                 MHIE    [facility]    [id number]

365 [facility]oria



        13:00:00 13:00:00                                         04      dennis Mart

 

        2016 Outpatient                 MHIE    [facility]IE    [id number]

365 [facility]oria



        13:00:00 13:00:00                                         04      dennis Mart

 

        2016 Outpatient                 MHIE    IE    [id number]

365 [facility]oria



        11:15:00 11:15:00                                         06      dennis Mart

 

        2016 Outpatient                 MHIE    IE    [id number]

365 Memoria



        11:15:00 11:15:00                                         06      dennis Mart

 

        2016 OP Therapy                 nullFlavo [facility]     [id number]

[id number] Memoria



        13:00:00 04:59:00 Patients                 xuan Rajput 03      dennis Mart

 

        2016 OP Therapy                 nullFlavo [facility]     [id number]

[id number] Memoria



        13:00:00 04:59:00 Patients                 xuan Rajput 03      dennis Mart

 

        2016 Outpatient         Do   2.16.840. 2.16.840.1. 3

[id number] 



        08:00:00 23:59:00                 Yoan RICHARDS [id number]. [id number] 03    

  



                                                3.615.55 5.55            

 

        2016 OP Therapy                 nullFlavo [facility]     [id number]

[id number] Memoria



        17:39:00 04:59:00 Patients                 r       Regulo 02      dennis Mart

 

        2016 OP Therapy                 nullFlavo [facility]     [id number]

[id number] Memoria



        17:39:00 04:59:00 Patients                 r       Regulo 02      dennis Mart

 

        2016 Outpatient         Do,   2.16.840. 2.16.840.1. 3

[id number] 



        12:39:00 23:59:00                 Yoan RICHARDS [id number]. [id number] 02    

  



                                                3.615.55 5.55            

 

        2016 OP Therapy                 nullFlavo [facility] [place] 346

[id number] Memoria



        19:00:00 04:59:00 Patients                 r       [place]         dennis Mart

 

        2016 OP Therapy                 nullFlavo [facility] [place] 346

[id number] Memoria



        19:00:00 04:59:00 Patients                 r       [place]         dennis Mart

 

        2016 Outpatient         Do,   2.16.840. 2.16.840.1. 3

[id number] 



        14:00:00 23:59:00                 Yoan RICHARDS [id number]. [id number] 01    

  



                                                3.615.69 5.69            

 

        2016 Outpt Diag                 nullFlavo [facility]    [id number]

[id number] Memoria



        16:14:00 04:59:00 Services                 r       Outpatient 04      l



                                                        Imaging         [place]



                                                        [place]         

 

        2016 Outpt Diag                 nullFlavo [facility]    [id number]

[id number] Memoria



        16:14:00 04:59:00 Services                 r       Outpatient 04      l



                                                        Imaging         [place]



                                                        [place]         

 

        2016 Outpatient         [address]    346

[id number] 



        11:14:00 23:59:00                 Roland Gallegos                         

 

        2016 OP Therapy                 nullFlavo [facility] [place] 346

[id number] Memoria



        19:00:00 04:59:00 Patients                 r       [place]   00      dennis Mart

 

        2016 OP Therapy                 nullFlavo [facility] [place]

[id number] Memoria



        19:00:00 04:59:00 Patients                 r       [place]   00      dennis Mart

 

        2016 Outpatient         Do   2.16.840. 2.16.840.1. 3

[id number] 



        14:00:00 23:59:00                 Yoan RICHARDS [id number]. [id number] 00    

  



                                                3.615.69 5.69            

 

        2016 Outpatient                 IE    [facility]    [id number]

365 Memoria



        10:20:00 10:20:00                                         01      dennis Mart

 

        2016 Outpatient                 IE    IE    [id number]

365 Memoria



        10:20:00 10:20:00                                         01      dennis Mart

 

        2016-05-10 2016 Outpt Diag                 nullFlavo [facility]    [id number]

[id number] Memoria



        14:59:00 04:59:00 Services                 r       Outpatient 03      l



                                                        Imaging         Barron Zarate            

 

        2016-05-10 2016 Outpt Diag                 nullFlavo [facility]    [id number]

[id number] Memoria



        14:59:00 04:59:00 Services                 r       Outpatient 03      l



                                                        Imaging         Barron Zarate            

 

        2016-05-10 2016-05-10 Outpatient         Abbi Somers 28    28    346

[id number] 



        09:59:00 23:59:00                 [place]                  2016 Outpt Diag                 nullFlavo [facility]    [id number]

26219 Memoria



        18:11:00 04:59:00 Services                 r       Outpatient 01      l



                                                        Imaging         [place]         

 

        2016 Outpt Diag                 nullFlavo [facility]    [id number]

02700 Memoria



        18:11:00 04:59:00 Services                 r       Outpatient 01      l



                                                        Imaging         [place]         

 

        2016 Outpatient         Abbi Somers 29    29    346

[id number] 



        13:11:00 23:59:00                 At[place]                  01      

 

        2016-03-10 2016-03-10 Outpatient                 nullFlavo [facility]    [id number]

   Memoria



        12:50:31 19:25:00                         r                       



                                                                        Barron

 

        2016-03-10 2016-03-10 Outpatient                 2.16.840. 2.16.840.1. 3

[id number]   Memoria



        12:50:31 19:25:00                         [id number]         

l



                                                3.2081.20 81.2000         Donny

n



                                                00                      [facility]



                                                                        l



                                                                        Hospita



                                                                        [facility]

 

        2016-03-10 2016-03-10 Outpatient                 nullFlavo [facility]    [id number]

   Memoria



        12:50:31 19:25:00                         r                       l



                                                                        [place]

 

        2016 2016-02-10 Outpt Diag                 nullFlavo [facility]    [id number]

04561 Memoria



        12:58:00 05:59:00 Services                 r       Outpatient 00      l



                                                        Imaging         Barron



                                                        [place]         

 

        2016 2016-02-10 Outpt Diag                 nullFlavo [facility]    [id number]

[id number] Memoria



        12:58:00 05:59:00 Services                 r       Outpatient 00      l



                                                        Imaging         [place]



                                                        [place]         

 

        2016 Outpatient         Somers, Abbi [address]    346

[id number] 



        06:58:00 23:59:00                 [place]                  00      

 

        2016 Outpatient                 MH[facility]    IE    [id number]

365 Memoria



        10:15:00 10:15:00                                         02      l



                                                                        [place]

 

        2016 Outpatient                 MHIE    IE    [id number]

365 [facility]



        10:15:00 10:15:00                                         02      dennis Mart

 

        2015 Outpatient                 MHIE    IE    [id number]

365 [facility]



        11:30:00 11:30:00                                         00      dennis Mart

 

        2015 Outpatient                 MHIE    IE    [id number]

365 [facility]oria



        11:30:00 11:30:00                                         00      dennis



                                                                        [place]

 

        2014 Outpatient                 nullFlavo [facility] [id number]

[id number] Memoria



        01:16:00 05:59:00                         r       Barron [id number] 



                                                        [place] 1       [facility]

 

        2014 Outpatient                 [facility] 3467

2273_3 Memoria



        01:16:00 05:59:00                         r       Barron [id number] 



                                                        [place] 1       [facility]

 

        2014 Outpatient         [facility] 2.16.840. 2.16.840.

1. [id number] 



        19:16:00 23:59:00                 , Vignesh [id number]        

 



                                        Caitlin  [id number]         



                                                00                      

 

        2014 Outpatient                 nullFlavo       [id number]

[id number] Memoria



        19:16:00 19:16:00                         r       [place] [address]      l



                                                                        [place]

 

        2014 Outpatient                 nullFlavo       [id number]

[id number] Memoria



        19:16:00 19:16:00                         r       [place] 01      l



                                                                        [place]

 

        2014 Outpatient                 nullFlavo       [id number]

[id number] Memoria



        19:43:00 19:43:00                         r       [place] [address]      l



                                                                        [place]

 

        2014 Outpatient                 nullFlavo       [id number]

[id number] Memoria



        19:43:00 19:43:00                         r       [place] [address]      l



                                                                        [place]







Results







           Test Description Test Time  Test Comments Results    Result Comments 

Source









                    ECG 12 lead         2022 22:49:39 









                      Test Item  Value      Reference Range Interpretation Comme

nts









             Ventricular rate (test code = 253)                                 

       

 

             QRSD interval (test code = 260)                                    

    

 

             QT interval (test code = 264)                                      

  

 

             QTC interval (test code = 265)                                     

   

 

             QRS axis 1 (test code = 268)                                       

 

 

             T wave axis (test code = 270)                                      

  

 

             EKG impression (test code = 273) Atrial fibrillation-Rightward axis

-ST & T                           



                          wave abnormality, consider inferior                   

        



                          ischemia-Abnormal ECG-In automated                    

       



                          comparison with ECG of 2022                    

       



                          02:20,-QRS axis shifted right-Electronically          

                 



                          Signed By Moris WIGGINS, Neftali YING (2008) on                

           



                          2022 4:49:37 PM                           



[facility] ChandFvsojdzbMECK-DvQ-1 (COVID-19) RNA [Presence] in Respiratory specimen 
by EDWARD with probe [id number] 04:29:38





             Test Item    Value        Reference Range Interpretation Comments

 

             SARS-CoV-2 (COVID-19) RNA Not detected                           



             [Presence] in Respiratory                                        



             specimen by EDWARD with probe                                        



             detection (test code = 14811-0)                                    

    

 

             Whether patient is employed in a Unknown                           

     



             healthcare setting (test code =                                    

    



             75427-6)                                            

 

             Whether the patient has symptoms Unknown                           

     



             related to condition of interest                                   

     



             (test code = 32285-3)                                        

 

             Whether the patient was Unknown                                



             hospitalized for condition of                                      

  



             interest (test code = 57907-3)                                     

   

 

             Whether the patient was admitted Unknown                           

     



             to intensive care unit (ICU) for                                   

     



             condition of interest (test code                                   

     



             = 77158-7)                                          

 

             Whether patient resides in a Unknown                               

 



             congregate care setting (test                                      

  



             code = 78405-0)                                        

 

             Pregnancy status (test code = Unknown                              

  



             77126-2)                                            

 

             Date and time of symptom onset Unknown                             

   



             (test code = 45271-3)                                        



[id number]49[facility]00





             Test Item    Value        Reference    Interpretation Comments



                                       Range                     

 

             PTH (test code = 146 pg/mL    16-77        H             Interpreti

ve Guide Intact



             2731-8)                                             PTH



                                                                 Calcium--------

----------



                                                                 ---------- ----

---Normal



                                                                 Parathyroid Nor

mal



                                                                 NormalHypoparat

hyroidism



                                                                 Low or Low Norm

al



                                                                 LowHyperparathy

roidism



                                                                 Primary Normal 

or High



                                                                 High Secondary 

High Normal



                                                                 or Low Tertiary

 High



                                                                 HighNon-Parathy

roid



                                                                 Hypercalcemia L

ow or Low



                                                                 Normal High

 

             SUGEY (test code = FASTING:NO                             



             SUGEY)         FASTING: NO                            

 

             RAC (test code = Performing                             



             RAC)         Organization                           



                          Information:                           



                          Site ID: RGA                           



                          Name: [facility]                               



                          Address: [address] [place] [address]                            



                          [id number]                             



                          Director:                              



                          Vignesh Felix                           

 

             Lab          Abnormal                               



             Interpretation                                        



             (test code =                                        



             94963-3)                                            



[facility] [id number] 17:49:00





             Test Item    Value        Reference Range Interpretation Comments

 

             TSH (test                 See_Comment                [Automated mes

zia]



             code =                                              The system ic

h



             3016-3)                                             generated this



                                                                 result transmit

kolby



                                                                 reference range

:



                                                                 0.40 - 4.50 mIU

/L.



                                                                 The reference r

cheyenne



                                                                 was not used to



                                                                 interpret this



                                                                 result as



                                                                 normal/abnormal

.

 

             SUGEY (test    FASTING:NO FASTING:                           



             code = SUGEY)  NO                                     

 

             RAC (test    Performing                             



             code = RAC)  Organization                           



                          Information: Site ID:                           



                          [facility]                           



                          Lab Address: [address] [place] [address]                            



                          [id number] Director:                           



                          Vignesh Felix                           



[facility] 25 hydroxy [id number] 17:49:00





             Test Item    Value        Reference Range Interpretation Comments

 

             Vitamin D,   82 ng/mL                         Vitamin D Statu

s



             25-hydroxy (test                                        25-OH Vitam

in D:



             code = 1989-3)                                        Deficiency: <

20



                                                                 ng/mLInsufficie

ncy



                                                                 : 20 - 29



                                                                 ng/mLOptimal: >

 or



                                                                 = 30 ng/mL For



                                                                 25-OH Vitamin D



                                                                 testing on



                                                                 patients on



                                                                 D2-supplementat

ion



                                                                 and patients fo

r



                                                                 whom quantitati

on



                                                                 of D2 and D3



                                                                 fractions is



                                                                 required, the



                                                                 [employer]ureD(TM

)25



                                                                 -OH VIT D,



                                                                 (D2,D3), LC/MS/

MS



                                                                 is recommended:



                                                                 order code 9288

8



                                                                 (patients



                                                                 >2yrs).See Note

 1



                                                                 Note 1 For



                                                                 additional



                                                                 information,



                                                                 please refer to



                                                                 http://educatio

n.Q



                                                                 [facility]

.co



                                                                 m/faq/BBQ376 (T

his



                                                                 link is being



                                                                 provided for



                                                                 informational/e

radu



                                                                 ational purpose

s



                                                                 only.)

 

             SUGEY (test code = FASTING:NO FASTING:                           



             SUGEY)         NO                                     

 

             RAC (test code = Performing                             



             RAC)         Organization                           



                          Information: Site                           



                          ID: [facility]                           



                          Lab Address: [address] [place] [address]                            



                          [phone] Director:                           



                          Vignesh Felix                           



[facility] [id number] 04:51:00





             Test Item    Value        Reference Range Interpretation Comments

 

             POC glucose (test code = 129 mg/dL    65-99        H            Ope

rator Name:



             66022-6)                                            Juliana



                                                                 [place]~Daniela

ce



                                                                 ID:



                                                                 [id number]~Luzmaria

table



                                                                 : HMW Notified 

RN

 

             Lab Interpretation (test Abnormal                               



             code = 19136-3)                                        



[facility], (w Contrast, Strain and
3D if needed)2022 14:28:07





             Test Item    Value        Reference    Interpretation Comments



                                       Range                     

 

             RA pressure (test              mmHg                      



             code = 1711241894)                                        

 

             EF (test code = 50 %         54-74        A            



             4092794099)                                         

 

             IVS,d (test code = 0.92 cm      0.6-0.9      A            



             2363396687)                                         

 

             IVS s 2D (test code 1.08 cm                                



             = 6060095783)                                        

 

             LVPWD,d (test code = 0.93 cm      0.60-1.19                 



             1103552237)                                         

 

             LVPW s PLAX (test 1.13 cm                                



             code = 3183074665)                                        

 

             LV,s (test code = 2.94 cm                                



             9337369869)                                         

 

             LVOT Diam,S (test 1.98 cm                                



             code = 4824891395)                                        

 

             LV PEREZ VOL (test 66.04 ml                         



             code = 2553330201)                                        

 

             LV SYS VOL (test 33.32 ml     14-42                     



             code = 9491429683)                                        

 

             MV Peak E Adama (test 1.61 m/s                               



             code = 0548008381)                                        

 

             MV Peak A Adama (test 0.58 m/s                               



             code = 1800935238)                                        

 

             E/A ratio (test code              See_Comment  A             [Autom

ated



             = 5937382683)                                        message] The



                                                                 system which



                                                                 generated this



                                                                 result



                                                                 transmitted



                                                                 reference range

:



                                                                 <=0.8. The



                                                                 reference range



                                                                 was not used to



                                                                 interpret this



                                                                 result as



                                                                 normal/abnormal

.

 

             E wave decelartion              See_Comment  A             [Automat

ed



             time (test code =                                        message] T

he



             2355422305)                                         system which



                                                                 generated this



                                                                 result



                                                                 transmitted



                                                                 reference range

:



                                                                 200 msec. The



                                                                 reference range



                                                                 was not used to



                                                                 interpret this



                                                                 result as



                                                                 normal/abnormal

.

 

             LV,d (test code = 3.90 cm                                



             2550201333)                                         

 

             IVS/LVPW,2D (test                                        



             code = 5117814792)                                        

 

             LV EF,2D (test code 57.26 %                                



             = 0713397856)                                        

 

             LV FS Cube 2D (test                                        



             code = 9423081424)                                        

 

             LV FS Teich 2D (test                                        



             code = 1838528982)                                        

 

             LV SV Teich 2D (test 32.73 ml                               



             code = 8987095269)                                        

 

             LV Vol s Teich PSAX 33.32 ml                               



             (test code =                                        



             5404979029)                                         

 

             LVOT stroke volume 0.46 cm3                               



             (test code =                                        



             6129989198)                                         

 

             Left Atrium  5.30 cm      See_Comment  A             [Automated



             Dimension Anterior                                        message] 

The



             (test code =                                        system which



             1335441647)                                         generated this



                                                                 result



                                                                 transmitted



                                                                 reference range

:



                                                                 <=3.8. The



                                                                 reference range



                                                                 was not used to



                                                                 interpret this



                                                                 result as



                                                                 normal/abnormal

.

 

             LA Vol MOD A4C (test 130.09 ml                              



             code = 6328035180)                                        

 

             LA area s A4C (test 36.52 cm2                              



             code = 6485464515)                                        

 

             LVOT area (test code 3.08 cm2                               



             = 6374944091)                                        

 

             LVOT Vmax (test code 0.85 m/s                               



             = 4862062527)                                        

 

             AoV Mean PG (test              See_Comment                [Automate

d



             code = 7754872504)                                        message] 

The



                                                                 system which



                                                                 generated this



                                                                 result



                                                                 transmitted



                                                                 reference range

:



                                                                 20 mmHg. The



                                                                 reference range



                                                                 was not used to



                                                                 interpret this



                                                                 result as



                                                                 normal/abnormal

.

 

             AoV Peak PG (test              mmHg                      



             code = 4597726933)                                        

 

             AV LVOT peak              mmHg                      



             gradient (test code                                        



             = 5400283986)                                        

 

             AoV Area, Vmax (test 1.94 cm2     See_Comment                [Autom

ated



             code = 2758338165)                                        message] 

The



                                                                 system which



                                                                 generated this



                                                                 result



                                                                 transmitted



                                                                 reference range

:



                                                                 >=1.5. The



                                                                 reference range



                                                                 was not used to



                                                                 interpret this



                                                                 result as



                                                                 normal/abnormal

.

 

             LVOT VTI (CM) (test 15.00 cm                               



             code = 8892710378)                                        

 

             AoV Vmax (test code 1.35 m/s                               



             = 9309635021)                                        

 

             AoV Vmn (test code = 0.82 m/s                               



             7965414845)                                         

 

             AoV Area, VTI (test 2.30 cm2                               



             code = 9079613321)                                        

 

             LVOT CO (test code = 4.72 l/min                             



             7476412140)                                         

 

             LVOT HR for LVOT CO              bpm                       



             (test code =                                        



             1875822156)                                         

 

             Velocity Ratio 0.63 m/s                               



             (V1/V2) (test code =                                        



             4689)                                               

 

             MV mean gradient              See_Comment                [Automated



             (test code =                                        message] The



             3855193067)                                         system which



                                                                 generated this



                                                                 result



                                                                 transmitted



                                                                 reference range

:



                                                                 5 mmHg. The



                                                                 reference range



                                                                 was not used to



                                                                 interpret this



                                                                 result as



                                                                 normal/abnormal

.

 

             MR peak grad (test              mmHg                      



             code = 8399273864)                                        

 

             MV stenosis pressure 30.12 ms     See_Comment                [Autom

ated



             1/2 time (test code                                        message]

 The



             = 4835677890)                                        system which



                                                                 generated this



                                                                 result



                                                                 transmitted



                                                                 reference range

:



                                                                 <=150. The



                                                                 reference range



                                                                 was not used to



                                                                 interpret this



                                                                 result as



                                                                 normal/abnormal

.

 

             MV E A ratio (test                                        



             code = 0947193622)                                        

 

             MV valve area p 1/2 7.30 cm2                               



             method (test code =                                        



             1647489516)                                         

 

             MV VTI Tips (test 0.15 m                                 



             code = 7285874495)                                        

 

             MV Vmax (test code = 0.72 m                                 



             3781832203)                                         

 

             RVSP (test code =              See_Comment  A             [Automate

d



             3804280605)                                         message] The



                                                                 system which



                                                                 generated this



                                                                 result



                                                                 transmitted



                                                                 reference range

:



                                                                 40.00 mmHg. The



                                                                 reference range



                                                                 was not used to



                                                                 interpret this



                                                                 result as



                                                                 normal/abnormal

.

 

             TR pk grad (test              mmHg                      



             code = 9005526223)                                        

 

             TR Vpeak (test code 2.85 m/s                               



             = 2073457735)                                        

 

             RVSP (TR) (test code              mmHg                      



             = 8334588878)                                        

 

             RVOT Vmax (test code 0.44 m/s                               



             = 7979809877)                                        

 

             PV Mean Grad (test              mmHg                      



             code = 9201904200)                                        

 

             PV Pk Grad (test              See_Comment                [Automated



             code = 3041721674)                                        message] 

The



                                                                 system which



                                                                 generated this



                                                                 result



                                                                 transmitted



                                                                 reference range

:



                                                                 36 mmHg. The



                                                                 reference range



                                                                 was not used to



                                                                 interpret this



                                                                 result as



                                                                 normal/abnormal

.

 

             PV VTI (test code = 0.17 m                                 



             1754714453)                                         

 

             RVOT pk grad (test              mmHg                      



             code = 1696046644)                                        

 

             PV VMAX (test code = 1.05 m/s     See_Comment                [Autom

ated



             1360915474)                                         message] The



                                                                 system which



                                                                 generated this



                                                                 result



                                                                 transmitted



                                                                 reference range

:



                                                                 <=3. The



                                                                 reference range



                                                                 was not used to



                                                                 interpret this



                                                                 result as



                                                                 normal/abnormal

.

 

             PV Vmn (test code =                                        



             5678750423)                                         

 

             Ao Root Diameter 2.95 cm      See_Comment                [Automated



             (test code =                                        message] The



             1680196346)                                         system which



                                                                 generated this



                                                                 result



                                                                 transmitted



                                                                 reference range

:



                                                                 <=3.99. The



                                                                 reference range



                                                                 was not used to



                                                                 interpret this



                                                                 result as



                                                                 normal/abnormal

.

 

             Ao Root Diameter 2.95 cm                                



             (test code =                                        



             9823726090)                                         

 

             Ascending aorta 2.58 cm                                



             (test code =                                        



             8385053292)                                         

 

             Pred METS R1 (test                                        



             code = 6718950919)                                        

 

             Pred Exer Dur R1                                        



             (test code =                                        



             9186168236)                                         

 

             MV Decel slope (test 15.54 m/s2                             



             code = 2295744042)                                        

 

             LVPW pct thck PLAX 20.67 %                                



             (test code =                                        



             4962474433)                                         

 

             LV vol s cube 2D 25.42 ml                               



             (test code =                                        



             3835231127)                                         

 

             LV vol d cube 2D 59.47 ml                               



             (test code =                                        



             6575424948)                                         

 

             LV SV Cube 2D (test 34.05 ml                               



             code = 1552655405)                                        

 

             IVS pct thck PLAX 17.04 %                                



             (test code =                                        



             5462327399)                                         

 

             Calc MPHR (test code              bpm                       



             = 1110448048)                                        

 

             85 of MPHR (test                                        



             code = 0273012463)                                        

 

             RVOT VTI (test code 0.08 m                                 



             = 2945517839)                                        

 

             RVOT Vmn (test code 0.30 m/s                               



             = 7442731054)                                        

 

             RVOT mean grad (test              mmHg                      



             code = 3303084948)                                        

 

             MAX Pred HR (test                                        



             code = 9080584355)                                        

 

             LVOT mean grad (test              mmHg                      



             code = 5306073508)                                        

 

             Aov area Vmn (test 2.31 cm2                               



             code = 7625736497)                                        

 

             MV AE ratio (test                                        



             code = 4837236369)                                        

 

             LVOT Vmn (test code                                        



             = 2356220199)                                        

 

             MR Vmax (test code = 4.65 m/s                               



             7931847400)                                         

 

             AoV VTI (test code = 0.20 m                                 



             1421597823)                                         

 

             LVOT VTI (test code 0.15 m                                 



             = 4780553999)                                        

 

                          SUGEY (test code =          

                                                           



             SUGEY)          Left Ventricle:                           



                          Normal systolic                           



                          function with a                           



                          visually estimated                           



                          EF of 55 - 60%.                           



                          Grade III                              



                          (restrictive)                           



                          diastolic                              



                                                    dysfunction.

                                                           



                           Right Ventricle:                           



                          Right ventricle is                           



                          moderately dilated.                           



                          Mildly reduced                           



                                                    systolic function.

                                                           



                           Tricuspid Valve:                           



                          Moderate valvular                           



                          regurgitation. Left                           



                          VentricleLeft                           



                          ventricle size is                           



                          normal. Normal                           



                          systolic function                           



                          with a visually                           



                          estimated EF of 55 -                           



                          60%. Grade III                           



                          (restrictive)                           



                          diastolic                              



                          dysfunction.Right                           



                          VentricleRight                           



                          ventricle is                           



                          moderately dilated.                           



                          Mildly reduced                           



                          systolic                               



                          function.Left                           



                          AtriumLeft atrium is                           



                          severely                               



                          dilated.Right                           



                          AtriumRight atrium                           



                          is severely                            



                          dilated.Mitral                           



                          ValveMild mitral                           



                          annular                                



                          calcification. Mild                           



                          valvular                               



                          regurgitation.Tricus                           



                          pid ValveValve                           



                          structure is normal.                           



                          Moderate valvular                           



                          regurgitation.Aortic                           



                          ValveValve structure                           



                          is normal. No                           



                          significant valvular                           



                          regurgitation.Pulmon                           



                          ic ValveValve                           



                          structure is normal.                           



                          Mild valvular                           



                          regurgitation.Perica                           



                          rdiumThere is no                           



                          pericardial effusion                           



                          present.Study                           



                          DetailsStudy quality                           



                          was adequate. A                           



                          complete 2D, color                           



                          flow Doppler and                           



                          spectral Doppler                           



                          echocardiogram was                           



                          performed.The                           



                          apical, parasternal,                           



                          subcostal and                           



                          suprasternal views                           



                          were obtained.                           



                          Technical                              



                          difficulties due to                           



                          lung artifact.                           

 

             Lab Interpretation Abnormal                               



             (test code =                                        



             74447-0)                                            



[facility] Respiratory specimen 
by EDWARD with probe [id number] 19:34:22





             Test Item    Value        Reference Range Interpretation Comments

 

             SARS-CoV-2 (COVID-19) RNA [Presence] Detected                      

         



             in Respiratory specimen by EDWARD with                                

        



             probe detection (test code =                                       

 



             14786-3)                                            

 

             Whether patient is employed in a Unknown                           

     



             healthcare setting (test code =                                    

    



             19266-6)                                            

 

             Whether the patient has symptoms Unknown                           

     



             related to condition of interest                                   

     



             (test code = 58622-5)                                        

 

             Whether the patient was hospitalized Unknown                       

         



             for condition of interest (test code                               

         



             = 41362-1)                                          

 

             Whether the patient was admitted to Unknown                        

        



             intensive care unit (ICU) for                                      

  



             condition of interest (test code =                                 

       



             62299-0)                                            

 

             Whether patient resides in a Unknown                               

 



             congregate care setting (test code =                               

         



             67822-5)                                            

 

             Pregnancy status (test code = Unknown                              

  



             54457-8)                                            

 

             Date and time of symptom onset (test Unknown                       

         



             code = 26374-0)                                        



[facility] [id number] 16[facility]39:00





             Test Item    Value        Reference Range Interpretation Comments

 

             SODIUM (test code = NA) 138 mEq/L    134-147      N            

 

             POTASSIUM (test code = 3.7 mEq/L    3.4-5.0      N            



             K)                                                  

 

             CHLORIDE (test code = 100 mEq/L    100-108      N            



             CL)                                                 

 

             CARBON DIOXIDE (test 28 mEq/l     21-33        N            



             code = CO2)                                         

 

             ANION GAP (test code = 13           0-20         N            



             GAP)                                                

 

             GLUCOSE (test code = 77 mg/dL            N            



             GLU)                                                

 

             BLOOD UREA NITROGEN 13 mg/dL     7-18         N            



             (test code = BUN)                                        

 

             GLOMERULAR FILTRATION 11.6         80-90        L            Units 

of measure =



             RATE (test code = GFR)                                        ml/mi

n/1.73 m2

 

             CREATININE (test code = 3.9 mg/dL    0.6-1.3      H            



             CREAT)                                              

 

             CALCIUM (test code = 8.8 mg/dL    8.0-10.5     N            



             CA)                                                 



PROTHROMBIN [id number] 16:33:00





             Test Item    Value        Reference Range Interpretation Comments

 

             PROTHROMBIN TIME 13.1 SECONDS 9.3-12.9     H            



             PATIENT (test code =                                        



             PTP)                                                

 

             INTERN[employer] NORMAL 1.2          0.8-1.2      N             TARGET

 INR BY



             RATIO (test code =                                        INDICATIO

N Indication



             INR)                                                INR1. Prophylax

is of



                                                                 venous thrombos

is 2.0



                                                                 - 3.0 (orthoped

ic



                                                                 surgery), Proph

ylaxis



                                                                 of venous throm

bosis



                                                                 (other than hig

h-risk



                                                                 surgery), Treat

ment of



                                                                 Deep Vein



                                                                 Thrombosis/Pulm

onary



                                                                 Embolism, Preve

ntion



                                                                 of systemic emb

olism -



                                                                 Tissue heart va

lves,



                                                                 Acute Myocardia

l



                                                                 Infarction (to 

prevent



                                                                 systemic emboli

sm),



                                                                 Valvular heart



                                                                 disease, Atrial



                                                                 Fibrillation,



                                                                 Bileaflet mecha

nical



                                                                 valve in aortic



                                                                 position.2. Mec

hanical



                                                                 prosthetic valv

es



                                                                 (high risk), 2.

5 - 3.5



                                                                 Presence of Lup

us



                                                                 Anticoagulant o

r



                                                                 Antiphospholipi

d



                                                                 Antibodies, Pre

vention



                                                                 of systemic emb

olism -



                                                                 Acute Myocardia

l



                                                                 Infarction (to 

prevent



                                                                 recurrent infar

ct).



CBC W/AUTO [id number] 16:23:00





             Test Item    Value        Reference Range Interpretation Comments

 

             WHITE BLOOD CELL (test code = 9.2 x10 3/uL 4.5-11.0     N          

  



             WBC)                                                

 

             RED BLOOD CELL (test code = 3.17 x10 6/uL 3.54-5.02    L           

 



             RBC)                                                

 

             HEMOGLOBIN (test code = HGB) 10.3 g/dL    11.0-15.0    L           

 

 

             HEMATOCRIT (test code = HCT) 33.2 %       33.0-45.0    N           

 

 

             MEAN CELL VOLUME (test code = 104.7 fL     81.0-99.0    H          

  



             MCV)                                                

 

             MEAN CELL HGB (test code = MCH) 32.5 pg      27.0-33.0    N        

    

 

             MEAN CELL HGB CONCETRATION 31.0 g/dL    33.0-37.0    L            



             (test code = MCHC)                                        

 

             RED CELL DISTRIBUTION WIDTH CV 14.5 %       11.5-14.5    N         

   



             (test code = RDW)                                        

 

             RED CELL DISTRIBUTION WIDTH SD 56.6 fL      37.0-54.0    H         

   



             (test code = RDW-SD)                                        

 

             PLATELET COUNT (test code = 108 x10 3/uL 150-400      L            



             PLT)                                                

 

             MEAN PLATELET VOLUME (test code 10.2 fL      7.0-9.0      H        

    



             = MPV)                                              

 

             NEUTROPHIL % (test code = NT%) 80.2 %       56.0-77.0    H         

   

 

             IMMATURE GRANULOCYTE % (test 1.4 %        0.0-2.0      N           

 



             code = IG%)                                         

 

             LYMPHOCYTE % (test code = LY%) 11.0 %       14.0-32.0    L         

   

 

             MONOCYTE % (test code = MO%) 5.2 %        4.8-9.0      N           

 

 

             EOSINOPHIL % (test code = EO%) 1.5 %        0.3-3.7      N         

   

 

             BASOPHIL % (test code = BA%) 0.7 %        0.0-2.0      N           

 

 

             NUCLEATED RBC % (test code = 0.0 %        0-0          N           

 



             NRBC%)                                              

 

             NEUTROPHIL # (test code = NT#) 7.34 x10 3/uL 2.0-7.6      N        

    

 

             IMMATURE GRANULOCYTE # (test 0.13 x10 3/uL 0.00-0.03    H          

  



             code = IG#)                                         

 

             LYMPHOCYTE # (test code = LY#) 1.01 x10 3/uL 1.0-3.8      N        

    

 

             MONOCYTE # (test code = MO#) 0.48 x10 3/uL 0.1-0.8      N          

  

 

             EOSINOPHIL # (test code = EO#) 0.14 x10 3/uL 0.0-0.2      N        

    

 

             BASOPHIL # (test code = BA#) 0.06 x10 3/uL 0.0-0.2      N          

  

 

             NUCLEATED RBC # (test code = 0.00 x10 3/uL 0.0-0.1      N          

  



             NRBC#)                                              

 

             MANUAL DIFF REQUIRED (test code NO                                 

    



             = MDIFF)                                            



Hepatitis B surface [id number] 21:36:32





             Test Item    Value        Reference Range Interpretation Comments

 

             Hep B S Ab (test code <8.0         See_Comment                [Auto

mated



             = 21586-6)                                          message] The



                                                                 system which



                                                                 generated this



                                                                 result transmit

kolby



                                                                 reference range

:



                                                                 <8.0 mIU/mL. 

e



                                                                 reference range



                                                                 was not used to



                                                                 interpret this



                                                                 result as



                                                                 normal/abnormal

.

 

             SUGEY (test code = SUGEY)  ID -                           



                          DB                                     

 

             Lab Interpretation Normal                                 



             (test code = 93815-9)                                        



[id number]32





             Test Item    Value        Reference Range Interpretation Comments

 

             HEPATITIS B SURFACE ANTIBODY < mIU/mL     <8.0                     

 



             (BEAKER) (test code = 647)                                        



 ID - DBHepatitis B surface [id number] 21:24:22





             Test Item    Value        Reference Range Interpretation Comments

 

             Hepatitis B surface Nonreactive  Nonreactive               



             antigen (test code =                                        



             5195-3)                                             

 

             SUGEY (test code = SUGEY) Specimen is                            



                          considered negative                           



                          for HBsAg.                             

 

             Lab Interpretation (test Normal                                 



             code = 57518-8)                                        



[facility] [id number] 21:24:22





             Test Item    Value        Reference Range Interpretation Comments

 

             HEPATITIS B SURFACE ANTIGEN (2) Nonreactive  Nonreactive           

    



             (BEAKER) (test code = 2585)                                        



Specimen is considered negative for HBsAg.Transthoracic Echocardiogram Complete,
(w Contrast, Strain and 3D if needed)2022 13:23:07





             Test Item    Value        Reference    Interpretation Comments



                                       Range                     

 

             EF (test code = 66 %         54-74                     



             [id number])                                         

 

             IVS,d (test code = 1.03 cm      0.6-0.9      A            



             5384179511)                                         

 

             LVPWD,d (test code = 1.27 cm      0.60-1.19    A            



             5906538387)                                         

 

             LV,s (test code = 2.56 cm                                



             1608404843)                                         

 

             LVOT Diam,S (test 2.01 cm                                



             code = 3578980614)                                        

 

             LV PEREZ VOL (test 69.20 ml                         



             code = 3169318601)                                        

 

             LV SYS VOL (test 23.74 ml     14-42                     



             code = 0084377597)                                        

 

             MV Peak E Adama (test 1.20 m/s                               



             code = 5021114266)                                        

 

             MV Peak A Adama (test 0.47 m/s                               



             code = 6710033200)                                        

 

             E/A ratio (test code              See_Comment  A             [Autom

ated



             = 2160289932)                                        message] The



                                                                 system which



                                                                 generated this



                                                                 result



                                                                 transmitted



                                                                 reference range

:



                                                                 <=0.8. The



                                                                 reference range



                                                                 was not used to



                                                                 interpret this



                                                                 result as



                                                                 normal/abnormal

.

 

             E wave decelartion              See_Comment  A             [Automat

ed



             time (test code =                                        message] T

he



             9561911232)                                         system which



                                                                 generated this



                                                                 result



                                                                 transmitted



                                                                 reference range

:



                                                                 200 msec. The



                                                                 reference range



                                                                 was not used to



                                                                 interpret this



                                                                 result as



                                                                 normal/abnormal

.

 

             LV,d (test code = 3.98 cm                                



             1032815684)                                         

 

             IVS/LVPW,2D (test                                        



             code = 6624126829)                                        

 

             LV EF,2D (test code 73.32 %                                



             = 7291437752)                                        

 

             LV FS Cube 2D (test                                        



             code = 6354079820)                                        

 

             LV FS Teich 2D (test                                        



             code = 4543416592)                                        

 

             LV SV Teich 2D (test 45.46 ml                               



             code = 8495734391)                                        

 

             LV Vol s Teich PSAX 23.74 ml                               



             (test code =                                        



             9333888263)                                         

 

             LVOT stroke volume 0.35 cm3                               



             (test code =                                        



             2174271135)                                         

 

             Left Atrium  4.52 cm      See_Comment  A             [Automated



             Dimension Anterior                                        message] 

The



             (test code =                                        system which



             6175593525)                                         generated this



                                                                 result



                                                                 transmitted



                                                                 reference range

:



                                                                 <=3.8. The



                                                                 reference range



                                                                 was not used to



                                                                 interpret this



                                                                 result as



                                                                 normal/abnormal

.

 

             LA Vol MOD A4C (test 83.62 ml                               



             code = 7051736209)                                        

 

             LA area s A4C (test 28.59 cm2                              



             code = 8103201815)                                        

 

             LA Ao Ratio Mmode                                        



             (test code =                                        



             0525587307)                                         

 

             LVOT area (test code 3.17 cm2                               



             = 8158418286)                                        

 

             LVOT Vmax (test code 0.62 m/s                               



             = 3885964102)                                        

 

             AoV Mean PG (test              See_Comment                [Automate

d



             code = 2926436262)                                        message] 

The



                                                                 system which



                                                                 generated this



                                                                 result



                                                                 transmitted



                                                                 reference range

:



                                                                 20 mmHg. The



                                                                 reference range



                                                                 was not used to



                                                                 interpret this



                                                                 result as



                                                                 normal/abnormal

.

 

             AoV Peak PG (test              mmHg                      



             code = 8327482751)                                        

 

             AV LVOT peak              mmHg                      



             gradient (test code                                        



             = 9563306995)                                        

 

             AoV Area, Vmax (test 2.00 cm2     See_Comment                [Autom

ated



             code = 0979245977)                                        message] 

The



                                                                 system which



                                                                 generated this



                                                                 result



                                                                 transmitted



                                                                 reference range

:



                                                                 >=1.5. The



                                                                 reference range



                                                                 was not used to



                                                                 interpret this



                                                                 result as



                                                                 normal/abnormal

.

 

             LVOT VTI (CM) (test 11.00 cm                               



             code = 5595591909)                                        

 

             AoV Vmax (test code 0.98 m/s                               



             = 8115165649)                                        

 

             AoV Vmn (test code = 0.65 m/s                               



             8898641994)                                         

 

             AoV Area, VTI (test 2.42 cm2                               



             code = 1718801095)                                        

 

             Velocity Ratio 0.63 m/s                               



             (V1/V2) (test code =                                        



             4689)                                               

 

             MR peak grad (test              mmHg                      



             code = 8684824024)                                        

 

             MV stenosis pressure 31.00 ms     See_Comment                [Autom

ated



             1/2 time (test code                                        message]

 The



             = 5343800326)                                        system which



                                                                 generated this



                                                                 result



                                                                 transmitted



                                                                 reference range

:



                                                                 <=150. The



                                                                 reference range



                                                                 was not used to



                                                                 interpret this



                                                                 result as



                                                                 normal/abnormal

.

 

             MV E A ratio (test                                        



             code = 0067351354)                                        

 

             MV valve area p 1/2 7.10 cm2                               



             method (test code =                                        



             0301933499)                                         

 

             E prime lat (test                                        



             code = 1777054894)                                        

 

             E prine sept (test                                        



             code = 2806600010)                                        

 

             RVSP (test code =              See_Comment  A             [Automate

d



             1181861807)                                         message] The



                                                                 system which



                                                                 generated this



                                                                 result



                                                                 transmitted



                                                                 reference range

:



                                                                 40.00 mmHg. The



                                                                 reference range



                                                                 was not used to



                                                                 interpret this



                                                                 result as



                                                                 normal/abnormal

.

 

             RA pressure (test              mmHg                      



             code = 2464367720)                                        

 

             TR pk grad (test              mmHg                      



             code = 2274070104)                                        

 

             TR Vpeak (test code 3.51 m/s                               



             = 1226890327)                                        

 

             RVSP (TR) (test code              mmHg                      



             = 1317370962)                                        

 

             RVOT Vmax (test code 0.46 m/s                               



             = 2544634615)                                        

 

             PV Pk Grad (test              See_Comment                [Automated



             code = 1972882276)                                        message] 

The



                                                                 system which



                                                                 generated this



                                                                 result



                                                                 transmitted



                                                                 reference range

:



                                                                 36 mmHg. The



                                                                 reference range



                                                                 was not used to



                                                                 interpret this



                                                                 result as



                                                                 normal/abnormal

.

 

             RVOT pk grad (test              mmHg                      



             code = 9998045937)                                        

 

             PV VMAX (test code = 1.15 m/s     See_Comment                [Autom

ated



             8734014029)                                         message] The



                                                                 system which



                                                                 generated this



                                                                 result



                                                                 transmitted



                                                                 reference range

:



                                                                 <=3. The



                                                                 reference range



                                                                 was not used to



                                                                 interpret this



                                                                 result as



                                                                 normal/abnormal

.

 

             Ao root annulus 2.82 cm                                



             (test code =                                        



             0710079222)                                         

 

             Ao Root Diameter 3.23 cm      See_Comment                [Automated



             (test code =                                        message] The



             5582072812)                                         system which



                                                                 generated this



                                                                 result



                                                                 transmitted



                                                                 reference range

:



                                                                 <=3.99. The



                                                                 reference range



                                                                 was not used to



                                                                 interpret this



                                                                 result as



                                                                 normal/abnormal

.

 

             Ao Root Diameter 3.23 cm                                



             (test code =                                        



             3804003077)                                         

 

             Ascending aorta 3.69 cm                                



             (test code =                                        



             0568739776)                                         

 

             Pred METS R1 (test                                        



             code = 8802786533)                                        

 

             Pred Exer Dur R1                                        



             (test code =                                        



             5123692256)                                         

 

             MV Decel slope (test 11.26 m/s2                             



             code = 4487884091)                                        

 

             LV vol s cube 2D 16.83 ml                               



             (test code =                                        



             1291939948)                                         

 

             LV vol d cube 2D 63.08 ml                               



             (test code =                                        



             7351754433)                                         

 

             LV SV Cube 2D (test 46.25 ml                               



             code = 6432474935)                                        

 

             Calc MPHR (test code              bpm                       



             = 0073045660)                                        

 

             85 of MPHR (test                                        



             code = 2417121906)                                        

 

             MAX Pred HR (test                                        



             code = 8824517659)                                        

 

             LVOT mean grad (test              mmHg                      



             code = 9976309269)                                        

 

             Aov area Vmn (test 1.92 cm2                               



             code = 6052672835)                                        

 

             MV AE ratio (test                                        



             code = 9859240213)                                        

 

             LVOT Vmn (test code                                        



             = 0388878731)                                        

 

             MR Vmax (test code = 4.22 m/s                               



             4619441863)                                         

 

             AoV VTI (test code = 0.15 m                                 



             1686574088)                                         

 

             LVOT VTI (test code 0.11 m                                 



             = 0444526606)                                        

 

                          SUGEY (test code =          

                                                           



             SUGEY)          Left Ventricle:                           



                          Normal systolic                           



                          function with a                           



                          visually estimated                           



                          EF of 65 - 70%.                           



                          Grade I (impaired                           



                          relaxation)                            



                          diastolic                              



                                                    dysfunction.

                                                           



                           Left Atrium: Left                           



                          atrium is mildly                           



                                                    dilated.

                                                           



                           Right Ventricle:                           



                          Right ventricle is                           



                          moderately dilated.                           



                          Reduced systolic                           



                                                    function.

                                                           



                           Right Atrium: Right                           



                          atrium is moderately                           



                                                    dilated.

                                                           



                           Mitral Valve: Valve                           



                          structure is normal.                           



                          Mildly calcified                           



                          leaflets. Mild                           



                          valvular                               



                                                    regurgitation.

                                                           



                           Tricuspid Valve:                           



                          Moderate valvular                           



                          regurgitation.                           



                          Moderately elevated                           



                          pulmonary artery                           



                          systolic pressure.                           



                          RVSP is 59.82 mmHg.                           



                          Left VentricleLeft                           



                          ventricle size is                           



                          normal. Normal                           



                          systolic function                           



                          with a visually                           



                          estimated EF of 65 -                           



                          70%. Grade I                           



                          (impaired                              



                          relaxation)                            



                          diastolic                              



                          dysfunction.Right                           



                          VentricleRight                           



                          ventricle is                           



                          moderately dilated.                           



                          Reduced systolic                           



                          function.Left                           



                          AtriumLeft atrium is                           



                          mildly dilated.Right                           



                          AtriumRight atrium                           



                          is moderately                           



                          dilated.Mitral                           



                          ValveValve structure                           



                          is normal. Mildly                           



                          calcified leaflets.                           



                          Mild valvular                           



                          regurgitation.Tricus                           



                          pid ValveValve                           



                          structure is normal.                           



                          Moderate valvular                           



                          regurgitation.                           



                          Moderately elevated                           



                          pulmonary artery                           



                          systolic pressure.                           



                          RVSP is 59.82                           



                          mmHg.Aortic                            



                          ValveValve structure                           



                          appears tricuspid.                           



                          No significant                           



                          valvular                               



                          regurgitation. No                           



                          stenosis.Pulmonic                           



                          ValveValve structure                           



                          is                                     



                          normal.PericardiumTh                           



                          ere is no                              



                          pericardial effusion                           



                          present.Study                           



                          DetailsStudy quality                           



                          was good. A complete                           



                          2D, color flow                           



                          Doppler and spectral                           



                          Doppler                                



                          echocardiogram was                           



                          performed.The                           



                          apical, parasternal,                           



                          subcostal and                           



                          suprasternal views                           



                          were obtained.                           



                          Patient exhibited                           



                          sinus tachycardia.                           

 

             Lab Interpretation Abnormal                               



             (test code =                                        



             93276-1)                                            



[id number]





             Test Item    Value        Reference    Interpretation Comments



                                       Range                     

 

             Urine culture Proteus [id number]              A            Specime

n



             isolate (test cfu/mlThe                              InformationSpe

[facility]



             code = 00717-2) performance                            Source: Urin

eSpecimen



                          characteristics of                           Site: [facility]

an catch



                          this assay on this                           



                          isolatewere                            



                          validated by the                           



                          Microbiology                           



                          Laboratory at                           



                          [facility]. This                           



                          source has not been                           



                          approved by the                           



                          U.S. Food and Drug                           



                          Administration. The                           



                          results are not                           



                          intended to be used                           



                          as the sole means                           



                          for clinical                           



                          diagnosis or                           



                          patient management.                           



                          The Microbiology                           



                          Laboratory is                           



                          authorized under                           



                          the clinical                           



                          Laboratory                             



                          Improvement                            



                          Amendments of 1988                           



                          (CLIA-88) to                           



                          perform high                           



                          complexity testing.                           

 

             Lab          Abnormal                               



             Interpretation                                        



             (test code =                                        



             48924-4)                                            



[facility]Presence] in Respiratory specimen 
by EDWARD with probe [id number] 00:41:02





             Test Item    Value        Reference Range Interpretation Comments

 

             SARS-CoV-2 (COVID-19) RNA Not detected                           



             [Presence] in Respiratory                                        



             specimen by EDWARD with probe                                        



             detection (test code = 79758-7)                                    

    

 

             Whether patient is employed in a Unknown                           

     



             healthcare setting (test code =                                    

    



             65554-2)                                            

 

             Whether the patient has symptoms Unknown                           

     



             related to condition of interest                                   

     



             (test code = 54145-6)                                        

 

             Whether the patient was Unknown                                



             hospitalized for condition of                                      

  



             interest (test code = 78809-2)                                     

   

 

             Whether the patient was admitted Unknown                           

     



             to intensive care unit (ICU) for                                   

     



             condition of interest (test code                                   

     



             = 04413-1)                                          

 

             Whether patient resides in a Unknown                               

 



             congregate care setting (test                                      

  



             code = 63003-5)                                        

 

             Pregnancy status (test code = Unknown                              

  



             13347-3)                                            

 

             Date and time of symptom onset Unknown                             

   



             (test code = 58849-7)                                        



[facility]- XR [id number] 10:28:00
[facility] : 1955 Sex: 
F************************************************************ Name: 
SUZI SEARS [facility] : 1955 Age/S: 66 / F [address] [facility] 
Unit #: [id number] Loc: [place] [address] Phys: Suzie Dominguez MD  Acct: 
[id number] Dis Date: Status: REG SDC PHONE #: [phone] Exam Date: 
2022 0950 FAX #:  Reason: ERCP EXAMS: CPT: [id number] XR ERCP 47781  Fluoro
 Time: 33 SEC DAP (Gy m2): Air Kerma (mGy): EXAMINATION: - XR ERCP. LOCATION: 
S17. HISTORY: ERCP, CBD obstruction.  COMPARISON: None. FINDINGS/ IMPRESSION: 
Nine portable limited intraoperative fluoroscopic images of right upper quadrant
 during ERCP are submitted to radiology department. Initial imagedemonstrates 
CBD stent and postoperative clips in upper abdomen. Subsequent images 
demonstrate contrast opacification of CBD  and small bowel. Please see operative
 report for further details. No radiologist was present during procedure. 
FLUOROSCOPIC TIME: 35.6 seconds. Reference air Kerma: 11.146 mGy.** 
Electronically Signed by ISH Paz ** ** on 2022 at 1028 **
 Reported and signed by: Roverto Paz M.D. CC: Suzie Dominguez MD; 
Charisse Green MD  PAGE 1 Signed Report Name: SUZI SEARS 
: 1955 Age/S: 66 / F  [id number] Unit #: [id number]: 
[place] [address] Phys: Suzie Dominguez MD Acct: [id number] Dis Date: Status: 
REG SDC PHONE #: [phone] Exam Date: 2022 0950 FAX #: Reason: ERCP  
EXAMS: CPT: [id number] XR ERCP 00408 Fluoro Time: 33 SEC DAP (Gy m2): Air Kerma 
(mGy): (Continued) Technologist: Maribel Diaz, RT(R) Trnscb Date/Time: 
2022 (1028) t.SDR.ANS4  Orig Print D/T: S: 2022 (1032) PAGE 2 Signed
 [id number] 08:51:00





             Test Item    Value        Reference Range Interpretation Comments

 

             POTASSIUM (test code = K) 5.1 mmol/L   3.4-5.0      H            



CBC W/AUTO [id number] 08:15:00





             Test Item    Value        Reference Range Interpretation Comments

 

             WHITE BLOOD CELL (test code = 9.2 K/mm3    3.5-11.0     N          

  



             WBC)                                                

 

             RED BLOOD CELL (test code = 3.76 M/mm3   4.70-6.10    L            



             RBC)                                                

 

             HEMOGLOBIN (test code = HGB) 11.8 G/DL    10.4-14.9    N           

 

 

             HEMATOCRIT (test code = HCT) 37.7 %       31.5-44.1    N           

 

 

             MEAN CELL VOLUME (test code = 100.3 Fl     84.5-98.6    H          

  



             MCV)                                                

 

             MEAN CELL HGB (test code = MCH) 31.4 pg      27.0-34.2    N        

    

 

             MEAN CELL HGB CONCETRATION 31.3 G/DL    31.5-34.0    L            



             (test code = MCHC)                                        

 

             RED CELL DISTRIBUTION WIDTH 13.2 SD      11.5-14.5    N            



             (test code = RDW)                                        

 

             PLATELET COUNT (test code = 136 K/mm3    150-450      L            



             PLT)                                                

 

             MEAN PLATELET VOLUME (test code 9.80 fL      7.0-10.5     N        

    



             = MPV)                                              

 

             NEUTROPHIL % (test code = NT%) 71.9 %       40-76        N         

   

 

             IMMATURE GRANULOCYTE % (test 0.9 %        0.0-5.0      N           

 



             code = IG%)                                         

 

             LYMPHOCYTE % (test code = LY%) 18.1 %       20.5-51.1    L         

   

 

             MONOCYTE % (test code = MO%) 6.8 %        1.7-9.3      N           

 

 

             EOSINOPHIL % (test code = EO%) 1.5 %        0.0-6.0      N         

   

 

             BASOPHIL % (test code = BA%) 0.8 %        0.0-2.0      N           

 

 

             NUCLEATED RBC % (test code = 0.0 /100WBC% 0.0-1.0      N           

 



             NRBC%)                                              

 

             NEUTROPHIL # (test code = NT#) 6.7 K/mm3    1.8-7.6      N         

   

 

             IMMATURE GRANULOCYTE # (test 0.08 x10 3/uL 0.00-0.03    H          

  



             code = IG#)                                         

 

             LYMPHOCYTE # (test code = LY#) 1.7 K/mm3    0.6-3.2      N         

   

 

             MONOCYTE # (test code = MO#) 0.6 K/mm3    0.3-1.1      N           

 

 

             EOSINOPHIL # (test code = EO#) 0.1 K/mm3    0.0-0.4      N         

   

 

             BASOPHIL # (test code = BA#) 0.1 K/mm3    0.0-0.1      N           

 

 

             NUCLEATED RBC # (test code = 0.0 K/mm3    0.0-0.1      N           

 



             NRBC#)                                              

 

             MANUAL DIFF REQUIRED (test code NO DIFF/SCN  CRITERIA              

    



             = MDIFF)                                            



BASIC METABOLIC [id number] 08:08:00





             Test Item    Value        Reference Range Interpretation Comments

 

             SODIUM (test code = NA) 135 mmol/L   134-147      N            

 

             POTASSIUM (test code = K) 5.9 mmol/L   3.4-5.0      HH           

 

             CHLORIDE (test code = CL) 103 mmol/L   100-108      N            

 

             CARBON DIOXIDE (test code = CO2) 27 mmol/L    21-32        N       

     

 

             ANION GAP (test code = GAP) 5.0 GAP calc 4.0-15.0     N            

 

             GLUCOSE (test code = GLU) 109 MG/DL           N            

 

             BLOOD UREA NITROGEN (test code = 43 MG/DL     7-18         H       

     



             BUN)                                                

 

             GLOMERULAR FILTRATION RATE (test 4 estGFR     >60          L       

     



             code = GFR)                                         

 

             CREATININE (test code = CREAT) 10.5 MG/DL   0.6-1.0      H         

   

 

             CALCIUM (test code = CA) 9.9 MG/DL    8.5-10.1     N            



COVID 19 INHOUSE [id number] 14:11:00





             Test Item    Value        Reference Range Interpretation Comments

 

             COVID 19 INHOUSE AG NEGATIVE     Negative                  Per manu

facturer,



             (test code =                                        negative result

s should



             [id number])                                        be treated aspr

esumptive



                                                                 and, if inconsi

stent with



                                                                 clinical signs



                                                                 andsymptoms or 

necessary



                                                                 for patient man

agement,



                                                                 should betested

 with an



                                                                 alternative mol

ecular



                                                                 assay. Negative

 resultsdo



                                                                 not preclude SA

RS-CoV-2



                                                                 infection and s

hould not



                                                                 be usedas the s

ole basis



                                                                 for patient man

agement



                                                                 decisions. Nega

tive



                                                                 results should 

be



                                                                 considered in t

he context



                                                                 of apatient's r

ecent



                                                                 exposures, hist

ory,



                                                                 presence of cli

nicalsigns



                                                                 and symptoms co

nsistent



                                                                 with COVID-19.



CBC W/AUTO [id number] 14:00:00





             Test Item    Value        Reference Range Interpretation Comments

 

             WHITE BLOOD CELL 7.2 K/mm3    3.5-11.0     N            



             (test code = WBC)                                        

 

             RED BLOOD CELL (test 4.01 M/mm3   4.70-6.10    L            



             code = RBC)                                         

 

             HEMOGLOBIN (test code 12.6 G/DL    10.4-14.9    N            



             = HGB)                                              

 

             HEMATOCRIT (test code 39.7 %       31.5-44.1    N            



             = HCT)                                              

 

             MEAN CELL VOLUME 99.0 Fl      84.5-98.6    H            



             (test code = MCV)                                        

 

             MEAN CELL HGB (test 31.4 pg      27.0-34.2    N            



             code = MCH)                                         

 

             MEAN CELL HGB 31.7 G/DL    31.5-34.0    N            



             CONCETRATION (test                                        



             code = MCHC)                                        

 

             RED CELL DISTRIBUTION 13.3 SD      11.5-14.5    N            



             WIDTH (test code =                                        



             RDW)                                                

 

             PLATELET COUNT (test 106 K/mm3    150-450      L            



             code = PLT)                                         

 

             MEAN PLATELET VOLUME 10.20 fL     7.0-10.5     N            



             (test code = MPV)                                        

 

             NEUTROPHIL % (test 72.0 %       40-76        N            



             code = NT%)                                         

 

             IMMATURE GRANULOCYTE 0.7 %        0.0-5.0      N            



             % (test code = IG%)                                        

 

             LYMPHOCYTE % (test 17.8 %       20.5-51.1    L            



             code = LY%)                                         

 

             MONOCYTE % (test code 7.4 %        1.7-9.3      N            



             = MO%)                                              

 

             EOSINOPHIL % (test 1.4 %        0.0-6.0      N            



             code = EO%)                                         

 

             BASOPHIL % (test code 0.7 %        0.0-2.0      N            



             = BA%)                                              

 

             NUCLEATED RBC % (test 0.0 /100WBC% 0.0-1.0      N            



             code = NRBC%)                                        

 

             NEUTROPHIL # (test 5.2 K/mm3    1.8-7.6      N            



             code = NT#)                                         

 

             IMMATURE GRANULOCYTE 0.05 x10 3/uL 0.00-0.03    H            



             # (test code = IG#)                                        

 

             LYMPHOCYTE # (test 1.3 K/mm3    0.6-3.2      N            



             code = LY#)                                         

 

             MONOCYTE # (test code 0.5 K/mm3    0.3-1.1      N            



             = MO#)                                              

 

             EOSINOPHIL # (test 0.1 K/mm3    0.0-0.4      N            



             code = EO#)                                         

 

             BASOPHIL # (test code 0.1 K/mm3    0.0-0.1      N            



             = BA#)                                              

 

             NUCLEATED RBC # (test 0.0 K/mm3    0.0-0.1      N            



             code = NRBC#)                                        

 

             MANUAL DIFF REQUIRED NO DIFF/SCN  CRITERIA                  SLIDE R

SERGIOW



             (test code = MDIFF)                                        CONSISTA

NT WITH



                                                                 AUTO DIFFERENTI

AL.



HEPATIC FUNCTION [id number] 13:32:00





             Test Item    Value        Reference Range Interpretation Comments

 

             TOTAL PROTEIN (test code = PROT) 7.7 G/DL     6.4-8.2      N       

     

 

             ALBUMIN (test code = ALB) 3.4 G/DL     3.4-5.0      N            

 

             BILIRUBIN TOTAL (test code = BILT) 2.10 MG/DL   0.2-1.2      H     

       

 

             BILIRUBIN DIRECT (test code = 0.50 MG/DL   0.00-0.30    H          

  



             BILD)                                               

 

             BILIRUBIN INDIRECT (test code = 1.60 MG/DL   0.2-1.2      H        

    



             BILIND)                                             

 

             SGOT/AST (test code = AST) 17 Unit/L    15-37        N            

 

             SGPT/ALT (test code = ALT) 27 Unit/L    12-78        N            

 

             ALKALINE PHOSPHATASE TOTAL (test 222 Unit/L          H       

     



             code = ALKP)                                        



[id number] 13:32:00





             Test Item    Value        Reference Range Interpretation Comments

 

             LIPASE (test code = LIP) 109 Unit/L   114-286      L            



BASIC METABOLIC [id number] 13:32:00





             Test Item    Value        Reference Range Interpretation Comments

 

             SODIUM (test code = NA) 136 mmol/L   134-147      N            

 

             POTASSIUM (test code = K) 5.1 mmol/L   3.4-5.0      H            

 

             CHLORIDE (test code = CL) 103 mmol/L   100-108      N            

 

             CARBON DIOXIDE (test code = CO2) 26 mmol/L    21-32        N       

     

 

             ANION GAP (test code = GAP) 7.0 GAP calc 4.0-15.0     N            

 

             GLUCOSE (test code = GLU) 103 MG/DL           N            

 

             BLOOD UREA NITROGEN (test code = 37 MG/DL     7-18         H       

     



             BUN)                                                

 

             GLOMERULAR FILTRATION RATE (test 6 estGFR     >60          L       

     



             code = GFR)                                         

 

             CREATININE (test code = CREAT) 7.4 MG/DL    0.6-1.0      H         

   

 

             CALCIUM (test code = CA) 10.1 MG/DL   8.5-10.1     N            



[id number] 18:01:00





             Test Item    Value        Reference Range Interpretation Comments

 

             SURGICAL (test --------------------------------                    

       



             code = SR)   --------------------------------                      

     



                          ----------------------------RUN                       

    



                          DATE: 22 [facility] PAGE 1 RUN TIME:                       

    



                          180 Specimen Inquiry RUN USER:                       

    



                          INTERFACE                              



                          --------------------------------                      

     



                          --------------------------------                      

     



                          ----------------------------KYLE                      

     



                          ENT: SUZI SEARS ACCT #:                          

 



                          [id number] LOC: L.END U #:                          

 



                          [id number] AGE/SX: 66/F ROOM:                         

  



                          RE22REG DR:                           



                          Clark Cuellar MD :                           



                          56 BED:  DIS: STATUS: [facility] TLOC:                              



                          --------------------------------                      

     



                          --------------------------------                      

     



                          ----------------------------                          

 



                          SPEC #: 22:PMC: RECD:                           



                           STATUS: VERONICA AUTUMN                        

   



                          #: [id number] SOFIA:                        

    



                          [facility] DR: Clark Cuellar MD                          

 



                          ENTERED:  SP TYPE:                       

    



                          SURGICAL OTHR DR:                           



                          Charisse Green MD ORDERED:                         

  



                          93526/2, 11829, 51531, 51062,                         

  



                          ANATOMIC SPEC, SPECIMEN TRACK                         

  



                          COPIES TO: Charisse Green MD                       

    



                          [address] [place] [address] [phone]                           



                          [place] [address]                         

  



                          [phone] PROCEDURES: [id number]                        

   



                          () 07250                           



                          () 07230                           



                          () 99624                           



                          () SPECIMEN TRACK                        

   



                          () TISSUES: A.                           



                          GASTRIC MUCOSA - BX ANTRUM AND                        

   



                          BODY  B. ESOPHAGUS BIOPSY -                           



                          DISTAI ESOPHAGUS BX FINAL                           



                          DIAGNOSIS A. Stomach, antrum and                      

     



                          body, endoscopic biopsy:-                           



                          Healing/reparative/chemical                           



                          gastropathy changes, minimal to                       

    



                          mild- Negative for intestinal                         

  



                          metaplasia- Negative for                           



                          Helicobacter pylori                           



                          (Immunohistochemistry stain) B.                       

    



                          Esophagus, distal, endoscopic                         

  



                          biopsy:- Reflux esophagitis,                          

 



                          mild- Negative for glandular                          

 



                          epithelium/intestinal                           



                          metaplasia/dysplasia (Alcian                          

 



                          Blue stain)- Negative for fungal                      

     



                          organisms (PAS stain) Comment:                        

   



                          Suggest clinical correlation.                         

  



                          Note: For each marker stain                           



                          tested above: Positive control                        

   



                          is positive and                           



                          Negative(external or internal)                        

   



                          control is negative (staining                         

  



                          performed at [facility]).                       

    



                          ** CONTINUED ON NEXT PAGE **                          

 



                          --------------------------------                      

     



                          --------------------------------                      

     



                          ----------------------------RUN                       

    



                          DATE: 22 [facility] PAGE 2 RUN TIME:                       

    



                          1801  Specimen Inquiry RUN USER:                      

     



                          INTERFACE                              



                          --------------------------------                      

     



                          --------------------------------                      

     



                          ----------------------------SPEC                      

     



                          #: 22:[facility]: PATIENT:                           



                          SUZI SEARS #[id number]                         

  



                          (Continued)---------------------                      

     



                          --------------------------------                      

     



                          --------------------------------                      

     



                          ------- GROSS DESCRIPTION A.                          

 



                          Antrum and body biopsy. It                           



                          consists of 4 tissue fragments                        

   



                          measuring 2-5 mm. All as A1. B.                       

    



                          Distal esophageal biopsy. It                          

 



                          consists of 2 tissue fragments                        

   



                          measuring 3 mm each. Allas B1.                        

   



                          Technical component performed at                      

     



                          [id number] ,                        

   



                          [place] [address] Unless gross                         

  



                          only, the diagnosis is based                          

 



                          upon microscopic                           



                          examination.Immunohistochemistry                      

     



                          : This test was developed and                         

  



                          its performancecharacteristics                        

   



                          determined by this laboratory.                        

   



                          It has not been approved nordoes                      

     



                          it need approval by the [place] FDA.                       

    



                          Appropriate positive and                           



                          negative controlsare reviewed                         

  



                          and judged to be acceptable.                          

 



                          This laboratory is certified                          

 



                          underthe Clinical Laboratory                          

 



                          Improvement Amendments (CLIA-88)                      

     



                          as qualified toperform high                           



                          complexity clinical laboratory                        

   



                          testing. MICROSCOPIC DESCRIPTION                      

     



                          Findings are incorporated into                        

   



                          the diagnosis                           



                          section.------------------------                      

     



                          --------------------------------                      

     



                          --------------------------------                      

     



                          ---- Signed SIGNATURE ON FILE                         

  



                          Kye Fontenot 22 180                         

  



                          --------------------------------                      

     



                          --------------------------------                      

     



                          ----------------------------  **                      

     



                          END OF REPORT **                           



BASIC METABOLIC [id number] 08:13:00





             Test Item    Value        Reference Range Interpretation Comments

 

             SODIUM (test code = NA) 137 mmol/L   134-147      N            

 

             POTASSIUM (test code = K) 4.2 mmol/L   3.4-5.0      N            

 

             CHLORIDE (test code = CL) 105 mmol/L   100-108      N            

 

             CARBON DIOXIDE (test code = CO2) 23 mmol/L    21-32        N       

     

 

             ANION GAP (test code = GAP) 9.0 GAP calc 4.0-15.0     N            

 

             GLUCOSE (test code = GLU) 111 MG/DL           H            

 

             BLOOD UREA NITROGEN (test code = 41 MG/DL     7-18         H       

     



             BUN)                                                

 

             GLOMERULAR FILTRATION RATE (test 5 estGFR     >60          L       

     



             code = GFR)                                         

 

             CREATININE (test code = CREAT) 8.6 MG/DL    0.6-1.0      H         

   

 

             CALCIUM (test code = CA) 9.8 MG/DL    8.5-10.1     N            



- XR CHEST [id number] -[id number] 13:28:00
[facility] 1955 Sex: 
F************************************************************ Name: 
SUZI SEARS [place] : 1955 Age/S: 66 / F [id number] 
Unit #: [id number] Loc: [place] [address] Phys: Clark Cuellar MD Acct: 
[id number] Dis Date: Status: PRE [facility] [place], [place] PHONE #: [phone] Exam Date: 
2022 1253 FAX #: Reason: PRE OP EXAMS: CPT: [id number] XR CHEST 2 V 46335 
Fluoro Time: DAP (Gy m2): Air Kerma (mGy): Chest 2 views 2022 1:27 PM 
CLINICAL HISTORY: Preop COMPARISON: None available LOCATION: W1 IMPRESSION: 
There is elevation of the right hemidiaphragm. Bibasilar opacities suggest 
atelectasis. Pneumonia should be excluded clinically. Cardiomediastinal contours
 are within normal limits. The central vasculature is not engorged. A tunneled 
left hemodialysis catheter is present. There are chronic appearing degenerative 
changes in the skeleton. ** Electronically Signed by M.D. Timothy Seipel **  ** 
on 2022 at 1328 ** Reported and signed by: Timothy Seipel, M.D. CC: 
Charisse Green MD; Clark Cuellar MD PAGE 1 Signed Report Name: 
DAVID SEARS [place] : 1955 Age/S: 66 / F [id number] 
Unit #: [id number] Loc: [place] [address] Phys: Clark Cuellar MD Acct: 
[id number] Dis Date: Status: PRE [facility] [place] [place] PHONE #: [phone] Exam Date: 
2022 1257 FAX #: Reason: PRE OP EXAMS:  CPT: [id number] XR CHEST 2 V 23260 
Fluoro Time:DAP (Gy m2): Air Kerma (mGy): (Continued) Technologist: Kylah Jacobo,
 RT (R)(CT) Trnscb Date/Time: 2022 (9871) JoeTS14 Orig Print D/T: S: 
2022 (4821)  PAGE 2 Signed ReportBASIC METABOLIC [id number] 12:55:00





             Test Item    Value        Reference Range Interpretation Comments

 

             SODIUM (test code = NA) 134 mmol/L   134-147      N            

 

             POTASSIUM (test code = K) 4.6 mmol/L   3.4-5.0      N            

 

             CHLORIDE (test code = CL) 100 mmol/L   100-108      N            

 

             CARBON DIOXIDE (test code = CO2) 28 mmol/L    21-32        N       

     

 

             ANION GAP (test code = GAP) 6.0 GAP calc 4.0-15.0     N            

 

             GLUCOSE (test code = GLU) 113 MG/DL           H            

 

             BLOOD UREA NITROGEN (test code = 38 MG/DL     7-18         H       

     



             BUN)                                                

 

             GLOMERULAR FILTRATION RATE (test 6 estGFR     >60          L       

     



             code = GFR)                                         

 

             CREATININE (test code = CREAT) 7.0 MG/DL    0.6-1.0      H         

   

 

             CALCIUM (test code = CA) 10.5 MG/DL   8.5-10.1     H            



COVID 19 INHOUSE [id number] 12:52:00





             Test Item    Value        Reference Range Interpretation Comments

 

             COVID 19 INHOUSE AG NEGATIVE     Negative                  Per manu

facturer,



             (test code =                                        negative result

s should



             [id number])                                        be treated aspr

esumptive



                                                                 and, if inconsi

stent with



                                                                 clinical signs



                                                                 andsymptoms or 

necessary



                                                                 for patient man

agement,



                                                                 should betested

 with an



                                                                 alternative mol

ecular



                                                                 assay. Negative

 resultsdo



                                                                 not preclude SA

RS-CoV-2



                                                                 infection and s

hould not



                                                                 be usedas the s

ole basis



                                                                 for patient man

agement



                                                                 decisions. Nega

tive



                                                                 results should 

be



                                                                 considered in t

he context



                                                                 of apatient's r

ecent



                                                                 exposures, hist

ory,



                                                                 presence of cli

nicalsigns



                                                                 and symptoms co

nsistent



                                                                 with COVID-19.



PROTHROMBIN [id number] 12:44:00





             Test Item    Value        Reference Range Interpretation Comments

 

             PT PATIENT (test 12.2 SECONDS 9.3-12.9     N            



             code = PTP)                                         

 

             INTERNATIONAL NORMAL 1.07 INR Unit 0.8-1.2      N             TARGE

T INR BY



             RATIO (test code =                                        INDICATIO

N Indication



             INR)                                                INR1. Prophylax

is of



                                                                 venous thrombos

is 2.0



                                                                 - 3.0 (orthoped

ic



                                                                 surgery), Proph

ylaxis



                                                                 of venous throm

bosis



                                                                 (other than hig

h-risk



                                                                 surgery), Treat

ment of



                                                                 Deep Vein



                                                                 Thrombosis/Pulm

onary



                                                                 Embolism, Preve

ntion



                                                                 of systemic emb

olism -



                                                                 Tissue heart va

lves,



                                                                 Acute Myocardia

l



                                                                 Infarction (to 

prevent



                                                                 systemic emboli

sm),



                                                                 Valvular heart



                                                                 disease, Acute



                                                                 Myocardial Infa

rction



                                                                 (to prevent sys

temic



                                                                 embolism), Valv

ular



                                                                 heart disease, 

Atrial



                                                                 Fibrillation,



                                                                 Bileaflet mecha

nical



                                                                 valve in aortic



                                                                 position.2. Mec

hanical



                                                                 prosthetic valv

es



                                                                 (high risk), 2.

5 - 3.5



                                                                 Presence of Lup

us



                                                                 Anticoagulant o

r



                                                                 Antiphospholipi

d



                                                                 Antibodies, Pre

vention



                                                                 of systemic emb

olism -



                                                                 Acute Myocardia

l



                                                                 Infarction (to 

prevent



                                                                 recurrent infar

ct).



THROMBOPLASTIN TIME [id number] 12:44:00





             Test Item    Value        Reference Range Interpretation Comments

 

             THROMBOPLASTIN TIME PARTIAL 36.2 SECONDS 26-35        H            



             (test code = PTT)                                        



CBC W/AUTO [id number] 12:43:00





             Test Item    Value        Reference Range Interpretation Comments

 

             WHITE BLOOD CELL (test code = 9.1 K/mm3    3.5-11.0     N          

  



             WBC)                                                

 

             RED BLOOD CELL (test code = 4.16 M/mm3   4.70-6.10    L            



             RBC)                                                

 

             HEMOGLOBIN (test code = HGB) 13.1 G/DL    10.4-14.9    N           

 

 

             HEMATOCRIT (test code = HCT) 41.8 %       31.5-44.1    N           

 

 

             MEAN CELL VOLUME (test code = 100.5 Fl     84.5-98.6    H          

  



             MCV)                                                

 

             MEAN CELL HGB (test code = MCH) 31.5 pg      27.0-34.2    N        

    

 

             MEAN CELL HGB CONCETRATION 31.3 G/DL    31.5-34.0    L            



             (test code = MCHC)                                        

 

             RED CELL DISTRIBUTION WIDTH 13.8 SD      11.5-14.5    N            



             (test code = RDW)                                        

 

             PLATELET COUNT (test code = 140 K/mm3    150-450      L            



             PLT)                                                

 

             MEAN PLATELET VOLUME (test code 10.20 fL     7.0-10.5     N        

    



             = MPV)                                              

 

             NEUTROPHIL % (test code = NT%) 71.8 %       40-76        N         

   

 

             IMMATURE GRANULOCYTE % (test 1.3 %        0.0-5.0      N           

 



             code = IG%)                                         

 

             LYMPHOCYTE % (test code = LY%) 18.2 %       20.5-51.1    L         

   

 

             MONOCYTE % (test code = MO%) 6.7 %        1.7-9.3      N           

 

 

             EOSINOPHIL % (test code = EO%) 1.3 %        0.0-6.0      N         

   

 

             BASOPHIL % (test code = BA%) 0.7 %        0.0-2.0      N           

 

 

             NUCLEATED RBC % (test code = 0.0 /100WBC% 0.0-1.0      N           

 



             NRBC%)                                              

 

             NEUTROPHIL # (test code = NT#) 6.5 K/mm3    1.8-7.6      N         

   

 

             IMMATURE GRANULOCYTE # (test 0.12 x10 3/uL 0.00-0.03    H          

  



             code = IG#)                                         

 

             LYMPHOCYTE # (test code = LY#) 1.7 K/mm3    0.6-3.2      N         

   

 

             MONOCYTE # (test code = MO#) 0.6 K/mm3    0.3-1.1      N           

 

 

             EOSINOPHIL # (test code = EO#) 0.1 K/mm3    0.0-0.4      N         

   

 

             BASOPHIL # (test code = BA#) 0.1 K/mm3    0.0-0.1      N           

 

 

             NUCLEATED RBC # (test code = 0.0 K/mm3    0.0-0.1      N           

 



             NRBC#)                                              

 

             MANUAL DIFF REQUIRED (test code NO DIFF/SCN  CRITERIA              

    



             = MDIFF)                                            



SARS-CoV-2 (COVID-19) RNA [Presence] in Respiratory specimen by EDWARD with probe 
[id number] 23:10:10





             Test Item    Value        Reference Range Interpretation Comments

 

             SARS coronavirus RNA [Presence] Not detected                       

    



             in Isolate by EDWARD with probe                                       

 



             detection (test code = 51153-4)                                    

    

 

             Whether patient is employed in a No                                

     



             healthcare setting (test code =                                    

    



             33199-6)                                            

 

             Whether the patient has symptoms Yes                               

     



             related to condition of interest                                   

     



             (test code = 30863-6)                                        

 

             Whether the patient was No                                     



             hospitalized for condition of                                      

  



             interest (test code = 52063-6)                                     

   

 

             Whether the patient was admitted No                                

     



             to intensive care unit (ICU) for                                   

     



             condition of interest (test code                                   

     



             = 75262-0)                                          

 

             Whether patient resides in a No                                    

 



             congregate care setting (test                                      

  



             code = 74576-0)                                        

 

             Pregnancy status (test code = No                                   

  



             15009-1)                                            

 

             Date and time of symptom onset Unknown                             

   



             (test code = 41890-0)                                        



[facility] [Presence] in 
Respiratory specimen by EDWARD with probe [id number] 23:10:10





             Test Item    Value        Reference Range Interpretation Comments

 

             SARS-CoV-2 (COVID-19) RNA Not detected                           



             [Presence] in Respiratory                                        



             specimen by EDWARD with probe                                        



             detection (test code = 08595-1)                                    

    

 

             Whether patient is employed in a No                                

     



             healthcare setting (test code =                                    

    



             34991-0)                                            

 

             Whether the patient has symptoms Yes                               

     



             related to condition of interest                                   

     



             (test code = 14090-4)                                        

 

             Whether the patient was No                                     



             hospitalized for condition of                                      

  



             interest (test code = 82333-3)                                     

   

 

             Whether the patient was admitted No                                

     



             to intensive care unit (ICU) for                                   

     



             condition of interest (test code                                   

     



             = 11737-3)                                          

 

             Whether patient resides in a No                                    

 



             congregate care setting (test                                      

  



             code = 71551-1)                                        

 

             Pregnancy status (test code = No                                   

  



             91961-5)                                            

 

             Date and time of symptom onset Unknown                             

   



             (test code = 79713-6)                                        



[facility] [Presence] in 
Respiratory specimen by EDWARD with probe [id number] 22:34:30





             Test Item    Value        Reference Range Interpretation Comments

 

             SARS-CoV-2 (COVID-19) RNA Not detected Not-Detected              



             [Presence] in Respiratory                                        



             specimen by EDWARD with probe                                        



             detection (test code = 88708-3)                                    

    

 

             Whether patient is employed in a                                   

     



             healthcare setting (test code =                                    

    



             83886-3)                                            

 

             Whether the patient has symptoms                                   

     



             related to condition of interest                                   

     



             (test code = 66065-5)                                        

 

             Patient was hospitalized because                                   

     



             of this condition (test code =                                     

   



             51296-9)                                            

 

             Whether the patient was admitted                                   

     



             to intensive care unit (ICU) for                                   

     



             condition of interest (test code                                   

     



             = 13847-9)                                          

 

             Whether patient resides in a                                       

 



             congregate care setting (test                                      

  



             code = 24310-9)                                        



[facility]-CoV-2 (COVID-19) RNA [Presence] in 
Respiratory specimen by EDWARD with probe [id number] 22:35:42





             Test Item    Value        Reference Range Interpretation Comments

 

             SARS-CoV-2 (COVID-19) RNA Not detected Not-Detected              



             [Presence] in Respiratory                                        



             specimen by EDWARD with probe                                        



             detection (test code = 67826-9)                                    

    

 

             Whether patient is employed in a                                   

     



             healthcare setting (test code =                                    

    



             86285-2)                                            

 

             Whether the patient has symptoms                                   

     



             related to condition of interest                                   

     



             (test code = 37990-8)                                        

 

             Patient was hospitalized because                                   

     



             of this condition (test code =                                     

   



             32274-2)                                            

 

             Whether the patient was admitted                                   

     



             to intensive care unit (ICU) for                                   

     



             condition of interest (test code                                   

     



             = 32985-8)                                          

 

             Whether patient resides in a                                       

 



             congregate care setting (test                                      

  



             code = 13537-4)                                        



[facility]-CoV-2 (COVID-19) RNA [Presence] in 
Respiratory specimen by EDWARD with probe [id number] 22:46:10





             Test Item    Value        Reference Range Interpretation Comments

 

             SARS-CoV-2 (COVID-19) RNA Not detected Not-Detected              



             [Presence] in Respiratory                                        



             specimen by EDWARD with probe                                        



             detection (test code = 97410-6)                                    

    

 

             Whether patient is employed in a                                   

     



             healthcare setting (test code =                                    

    



             44902-4)                                            

 

             Whether the patient has symptoms                                   

     



             related to condition of interest                                   

     



             (test code = 50797-5)                                        

 

             Patient was hospitalized because                                   

     



             of this condition (test code =                                     

   



             77489-3)                                            

 

             Whether the patient was admitted                                   

     



             to intensive care unit (ICU) for                                   

     



             condition of interest (test code                                   

     



             = 32253-4)                                          

 

             Whether patient resides in a                                       

 



             congregate care setting (test                                      

  



             code = 34500-0)                                        



[facility] in 
Respiratory specimen by EDWARD with probe [id number] 01:54:24





             Test Item    Value        Reference Range Interpretation Comments

 

             SARS-CoV-2 (COVID-19) RNA Not detected Not-Detected              



             [Presence] in Respiratory                                        



             specimen by EDWARD with probe                                        



             detection (test code = 87544-9)                                    

    

 

             Whether patient is employed in a                                   

     



             healthcare setting (test code =                                    

    



             01659-8)                                            

 

             Whether the patient has symptoms                                   

     



             related to condition of interest                                   

     



             (test code = 36329-4)                                        

 

             Patient was hospitalized because                                   

     



             of this condition (test code =                                     

   



             10509-9)                                            

 

             Whether the patient was admitted                                   

     



             to intensive care unit (ICU) for                                   

     



             condition of interest (test code                                   

     



             = 75792-2)                                          

 

             Whether patient resides in a                                       

 



             congregate care setting (test                                      

  



             code = 01729-0)                                        



[facility]-CoV[facility] (C[facility]-19) RNA [Presence] in 
Respiratory specimen by EDWARD with probe [id number] 21:44:06





             Test Item    Value        Reference Range Interpretation Comments

 

             SARS-CoV-2 (COVID-19) RNA Not detected Not-Detected              



             [Presence] in Respiratory                                        



             specimen by EDWARD with probe                                        



             detection (test code = 74982-7)                                    

    

 

             Whether patient is employed in a                                   

     



             healthcare setting (test code =                                    

    



             49994-6)                                            

 

             Whether the patient has symptoms                                   

     



             related to condition of interest                                   

     



             (test code = 10404-6)                                        

 

             Patient was hospitalized because                                   

     



             of this condition (test code =                                     

   



             89139-1)                                            

 

             Whether the patient was admitted                                   

     



             to intensive care unit (ICU) for                                   

     



             condition of interest (test code                                   

     



             = 37241-0)                                          

 

             Whether patient resides in a                                       

 



             congregate care setting (test                                      

  



             code = 31912-6)                                        



[facility]-CoV[facility] (C[facility]-19) RNA [Presence] in 
Respiratory specimen by EDWARD with probe [id number] 00:36:59





             Test Item    Value        Reference Range Interpretation Comments

 

             SARS-CoV-2 (COVID-19) RNA Not detected Not-Detected              



             [Presence] in Respiratory                                        



             specimen by EDWARD with probe                                        



             detection (test code = 94326-0)                                    

    



[facility] (COVID-19) RNA [Presence] in 
Respiratory specimen by EDWARD with probe [id number] 17:35:35





             Test Item    Value        Reference Range Interpretation Comments

 

             SARS-CoV-2 (COVID-19) RNA Not detected Not-Detected              



             [Presence] in Respiratory                                        



             specimen by EDWARD with probe                                        



             detection (test code = 15165-9)                                    

    



[facility] ([facility]-19) RNA [Presence] in 
Respiratory specimen by EDWARD with probe [id number] 18:03:30





             Test Item    Value        Reference Range Interpretation Comments

 

             SARS-CoV-2 (COVID-19) RNA Not detected Not-Detected              



             [Presence] in Respiratory                                        



             specimen by EDWARD with probe                                        



             detection (test code = 56292-7)                                    

    



[facility] (C[facility]-19) RNA [Presence] in 
Respiratory specimen by EDWARD with probe [id number] 10:06:03





             Test Item    Value        Reference Range Interpretation Comments

 

             SARS-CoV-2 (COVID-19) RNA Not detected Not-Detected              



             [Presence] in Respiratory                                        



             specimen by EDWARD with probe                                        



             detection (test code = 40955-1)                                    

    



[facility] (C[facility]-19) RNA [Presence] in 
Respiratory specimen by EDWARD with probe [id number] 05:11:58





             Test Item    Value        Reference Range Interpretation Comments

 

             SARS-CoV-2 (COVID-19) RNA Not detected Not-Detected              



             [Presence] in Respiratory                                        



             specimen by EDWARD with probe                                        



             detection (test code = 15347-3)                                    

    



[facility] (C[facility]-19) RNA [Presence] in 
Respiratory specimen by EDWARD with probe [id number] 03:34:16





             Test Item    Value        Reference Range Interpretation Comments

 

             SARS-CoV-2 (COVID-19) RNA Not detected Not-Detected              



             [Presence] in Respiratory                                        



             specimen by EDWARD with probe                                        



             detection (test code = 10128-1)                                    

    



[facility] ([facility]-19) RNA [Presence] in 
Respiratory specimen by EDWARD with probe [id number] 10:06:41





             Test Item    Value        Reference Range Interpretation Comments

 

             SARS-CoV-2 (COVID-19) RNA Not detected Not-Detected              



             [Presence] in Respiratory                                        



             specimen by EDWARD with probe                                        



             detection (test code = 51443-0)                                    

    



[facility]2020-10-14 12:33:00





             Test Item    Value        Reference Range Interpretation Comments

 

             Chol (test code = Chol) 129                                    



[facility]2020-10-14 12:33:00





             Test Item    Value        Reference Range Interpretation Comments

 

             Chol (test code = Chol) 129                                    



[facility]2020-10-14 12:33:00





             Test Item    Value        Reference Range Interpretation Comments

 

             HDL (test code = HDL) 37                                     



[facility]2020-10-14 12:33:00





             Test Item    Value        Reference Range Interpretation Comments

 

             HDL (test code = HDL) 37                                     



[facility]2020-10-14 12:33:00





             Test Item    Value        Reference Range Interpretation Comments

 

             Trig (test code = Trig) 76                                     



[facility]2020-10-14 12:33:00





             Test Item    Value        Reference Range Interpretation Comments

 

             LDL (Calculated) (test code = LDL 76                               

      



             (Calculated))                                        



[facility]2020-10-14 12:33:00





             Test Item    Value        Reference Range Interpretation Comments

 

             CHD Risk (test code = CHD Risk) 3.5                                

    



[facility]





             Test Item    Value        Reference Range Interpretation Comments

 

             Non HDL Chol (test code = Non HDL Chol) 92                         

            



[facility]2020-10-14 12:33:00





             Test Item    Value        Reference Range Interpretation Comments

 

             Trig (test code = Trig) 76                                     



[facility]2020-10-14 12:33:00





             Test Item    Value        Reference Range Interpretation Comments

 

             LDL (Calculated) (test code = LDL 76                               

      



             (Calculated))                                        



[facility]





             Test Item    Value        Reference Range Interpretation Comments

 

             CHD Risk (test code = CHD Risk) 3.5                                

    



[facility]





             Test Item    Value        Reference Range Interpretation Comments

 

             Non HDL Chol (test code = Non HDL Chol) 92                         

            



[facility]2020-10-14 12:33:00





             Test Item    Value        Reference Range Interpretation Comments

 

             Chol (test code = Chol) 129                                    



[facility]2020-10-14 12:33:00





             Test Item    Value        Reference Range Interpretation Comments

 

             HDL (test code = HDL) 37                                     



[facility]





             Test Item    Value        Reference Range Interpretation Comments

 

             Trig (test code = Trig) 76                                     



[facility]2020-10-14 12:33:00





             Test Item    Value        Reference Range Interpretation Comments

 

             LDL (Calculated) (test code = LDL 76                               

      



             (Calculated))                                        



[facility]2020-10-14 12:33:00





             Test Item    Value        Reference Range Interpretation Comments

 

             CHD Risk (test code = CHD Risk) 3.5                                

    



[facility]





             Test Item    Value        Reference Range Interpretation Comments

 

             Non HDL Chol (test code = Non HDL Chol) 92                         

            



[facility] [employer] [id number] 14:47:00





             Test Item    Value        Reference Range Interpretation Comments

 

             Vitamin D, 25-OH, Total (test code = 37                      

         



             Vitamin D, 25-OH, Total)                                        



[facility] [place] [place] [id number] 14:47:00





             Test Item    Value        Reference Range Interpretation Comments

 

             U Creat mg/dL (test code = U Creat 114                       

       



             mg/dL)                                              



[facility] [employer] [id number] 14:47:00





             Test Item    Value        Reference Range Interpretation Comments

 

             U Prot/Creat (test code = U Prot/Creat) 430                  

            



[facility]2020-08-06 14:47:00





             Test Item    Value        Reference Range Interpretation Comments

 

             U Prot/Creat (test code = U 0.430 1      0.021-0.161               



             Prot/Creat)                                         



[facility]2020-08-06 14:47:00





             Test Item    Value        Reference Range Interpretation Comments

 

             U Protein (test code = U Protein) 49           5-24                

      



[facility]2020-08-06 14:47:00





             Test Item    Value        Reference Range Interpretation Comments

 

             Glucose Lvl (test code = Glucose Lvl) 103          65-99           

          



[place]250-08-06 14:47:00





             Test Item    Value        Reference Range Interpretation Comments

 

             BUN (test code = BUN) 24           7-25                      



[facility]2020-08-06 14:47:00





             Test Item    Value        Reference Range Interpretation Comments

 

             Creatinine Lvl (test code = Creatinine 1.32         0.50-0.99      

           



             Lvl)                                                



[facility]2020-08-06 14:47:00





             Test Item    Value        Reference Range Interpretation Comments

 

             eGFR NON-AFR. AMERICAN (test code = 43                             

        



             eGFR NON-AFR. AMERICAN)                                        



[facility]2020-08-06 14:47:00





             Test Item    Value        Reference Range Interpretation Comments

 

             eGFR  (test code = eGFR 50                         

            



             )                                        



[place] 256250-08-06 14:47:00





             Test Item    Value        Reference Range Interpretation Comments

 

             B/C Ratio (test code = B/C Ratio) 18           6-22                

      



[place] 256250-08-06 14:47:00





             Test Item    Value        Reference Range Interpretation Comments

 

             Sodium Lvl (test code = Sodium Lvl) 138          135-146           

        



[facility]2020-08-06 14:47:00





             Test Item    Value        Reference Range Interpretation Comments

 

             Potassium Lvl (test code = Potassium 4.4          3.5-5.3          

         



             Lvl)                                                



[facility]2020-08-06 14:47:00





             Test Item    Value        Reference Range Interpretation Comments

 

             Chloride Lvl (test code = Chloride Lvl) 102                  

            



[place] 256250-08-06 14:47:00





             Test Item    Value        Reference Range Interpretation Comments

 

             CO2 (test code = CO2) 30           20-32                     



[facility]2020-08-06 14:47:00





             Test Item    Value        Reference Range Interpretation Comments

 

             Calcium Lvl (test code = Calcium Lvl) 9.4          8.6-10.4        

          



[place] 256250-08-06 14:47:00





             Test Item    Value        Reference Range Interpretation Comments

 

             Phosphorus (test code = Phosphorus) 4.8          2.5-4.5           

        



[facility]2020-08-06 14:47:00





             Test Item    Value        Reference Range Interpretation Comments

 

             Albumin Lvl (test code = Albumin Lvl) 3.9          3.6-5.1         

          



[place] [address]-08-06 14:47:00





             Test Item    Value        Reference Range Interpretation Comments

 

             Plt Count Estimated (test code = DECREASED                         

     



             Plt Count Estimated)                                        



[place] 609650-08-06 14:47:00





             Test Item    Value        Reference Range Interpretation Comments

 

             WBC X 10x3 (test code = WBC X 10x3) 7.2          3.8-10.8          

        



[place] [address]-08-06 14:47:00





             Test Item    Value        Reference Range Interpretation Comments

 

             RBC X 10x6 (test code = RBC X 10x6) 3.71         3.80-5.10         

        



[place] [address]-08-06 14:47:00





             Test Item    Value        Reference Range Interpretation Comments

 

             Hgb (test code = Hgb) 10.7         11.7-15.5                 



[place] [address]-08-06 14:47:00





             Test Item    Value        Reference Range Interpretation Comments

 

             Hct (test code = Hct) 33.9         35.0-45.0                 



[place] [address]-08-06 14:47:00





             Test Item    Value        Reference Range Interpretation Comments

 

             MCV (test code = MCV) 91.4         80.0-100.0                



[place] [address]-08-06 14:47:00





             Test Item    Value        Reference Range Interpretation Comments

 

             MCH (test code = MCH) 28.8 pg      27.0-33.0                 



[place] [address]-08-06 14:47:00





             Test Item    Value        Reference Range Interpretation Comments

 

             MCHC (test code = MCHC) 31.6         32.0-36.0                 



[place] [address]-08-06 14:47:00





             Test Item    Value        Reference Range Interpretation Comments

 

             RDW (test code = RDW) 13.9         11.0-15.0                 



[facility]





             Test Item    Value        Reference Range Interpretation Comments

 

             Platelet (test code = Platelet) 110          140-400               

    



[facility] CenterMvaxrxwBQMGFAGQDZ9872-34-07 14:47:00





             Test Item    Value        Reference Range Interpretation Comments

 

             MPV (test code = MPV) 11.7         7.5-12.5                  



[facility] CenterUnhvduoJPZKQVGRNR6086-67-00 14:47:00





             Test Item    Value        Reference Range Interpretation Comments

 

             Neutrophils # (test code = Neutrophils 5414         0925-7191      

           



             #)                                                  



[facility]pp[facility]EBG[facility]TLNT1583[facility]42-20 14:47:00





             Test Item    Value        Reference Range Interpretation Comments

 

             Lymphocytes # (test code = Lymphocytes 1152         850-3900       

           



             #)                                                  



[facility] Center[facility]aglpasYNEWOTSNTQ9418-00-26 14:47:00





             Test Item    Value        Reference Range Interpretation Comments

 

             Monocytes # (test code = Monocytes #) 461          200-950         

          



[facility] CenterPjsjjhgPWAZAATBSG4673-27-46 14:47:00





             Test Item    Value        Reference Range Interpretation Comments

 

             Eosinophils # (test code = Eosinophils 122                   

           



             #)                                                  



[facility] Center[facility]agyczbWNTRDBCIFW3260-24-36 14:47:00





             Test Item    Value        Reference Range Interpretation Comments

 

             Basophils # (test code 50           See_Comment                [Aut

omated message] The



             = Basophils #)                                        system which 

generated



                                                                 this result tra

nsmitted



                                                                 reference range

: <=200.



                                                                 The reference r

cheyenne was



                                                                 not used to int

erpret



                                                                 this result as



                                                                 normal/abnormal

.



[id number] 14:47:00





             Test Item    Value        Reference Range Interpretation Comments

 

             Segs (test code = Segs) 75.2                                   



[facility] HospitalPupkhjtCSGFABFXNP3819-70-70 14:47:00





             Test Item    Value        Reference Range Interpretation Comments

 

             Lymphocytes (test code = Lymphocytes) 16.0                         

          



[facility] CenterLiwydyjEJBCMQMCAF3698-92-97 14:47:00





             Test Item    Value        Reference Range Interpretation Comments

 

             Monocytes (test code = Monocytes) 6.4                              

      



[facility] HospitalBsbzrzqJIVRCIEJMG0476-60-95 14:47:00





             Test Item    Value        Reference Range Interpretation Comments

 

             Eosinophils (test code = Eosinophils) 1.7                          

          



[facility] CenterZczdiuiRWTETAHDLR5795-01-91 14:47:00





             Test Item    Value        Reference Range Interpretation Comments

 

             Basophils (test code = Basophils) 0.7                              

      



[facility] [id number] 14:47:00





             Test Item    Value        Reference Range Interpretation Comments

 

             Result 2 (Urine Culture) See Result Comment                        

   



             (test code = Result 2                                        



             (Urine Culture))                                        



[facility] AND [id number] 14:47:00





             Test Item    Value        Reference Range Interpretation Comments

 

             UA Color (test code = UA Color) YELLOW                             

    



[facility] AND [id number] 14:47:00





             Test Item    Value        Reference Range Interpretation Comments

 

             UA Turbidity (test code = UA CLOUDY                                

 



             Turbidity)                                          



[facility] AND [id number] 14:47:00





             Test Item    Value        Reference Range Interpretation Comments

 

             UA Spec Grav (test code = UA Spec 1.017 1      1.001-1.035         

      



             Grav)                                               



[facility] AND [id number] 14:47:00





             Test Item    Value        Reference Range Interpretation Comments

 

             UA pH (test code = UA pH) 5.5 1        5.0-8.0                   



[facility] AND [id number] 14:47:00





             Test Item    Value        Reference Range Interpretation Comments

 

             UA Glucose (test code = UA Glucose) NEGATIVE                       

        



[facility] AND [id number] 14:47:00





             Test Item    Value        Reference Range Interpretation Comments

 

             UA Bili (test code = UA Bili) NEGATIVE                             

  



[facility] AND [id number] 14:47:00





             Test Item    Value        Reference Range Interpretation Comments

 

             UA Ketones (test code = UA Ketones) NEGATIVE                       

        



[facility] AND [id number] 14:47:00





             Test Item    Value        Reference Range Interpretation Comments

 

             UA Blood (test code = UA Blood) 1+                                 

    



[facility] AND [id number] 14:47:00





             Test Item    Value        Reference Range Interpretation Comments

 

             UA Protein (test code = UA Protein) 1+                             

        



[facility] AND [id number] 14:47:00





             Test Item    Value        Reference Range Interpretation Comments

 

             UA Nitrite (test code = UA Nitrite) POSITIVE                       

        



[facility] AND [id number] 14:47:00





             Test Item    Value        Reference Range Interpretation Comments

 

             UA Leuk Est (test code = UA Leuk Est) 2+                           

          



[facility] AND [id number] 14:47:00





             Test Item    Value        Reference Range Interpretation Comments

 

             UA WBC (test code = UA WBC) > OR = 60                              



[facility] AND [id number] 14:47:00





             Test Item    Value        Reference Range Interpretation Comments

 

             UA RBC (test code = UA RBC) 0-2                                    



[facility] [id number] 14:47:00





             Test Item    Value        Reference Range Interpretation Comments

 

             UA Sq Epi (test code = UA Sq Epi) NONE SEEN                        

      



[facility] [id number] 14:47:00





             Test Item    Value        Reference Range Interpretation Comments

 

             UA Bacteria (test code = UA Bacteria) MANY                         

          



[facility] [id number] 14:47:00





             Test Item    Value        Reference Range Interpretation Comments

 

             UA Hyal Cast (test code = UA Hyal NONE SEEN                        

      



             Cast)                                               



[facility] AND [id number] 14:47:00





             Test Item    Value        Reference Range Interpretation Comments

 

             UA Reflex (test code CULTURE INDICATED -                           



             = UA Reflex) RESULTS TO FOLLOW                           



[facility]2020-08-06 14:47:00





             Test Item    Value        Reference Range Interpretation Comments

 

             U Creat mg/dL (test code = U Creat 114                       

       



             mg/dL)                                              



[facility]2020-08-06 14:47:00





             Test Item    Value        Reference Range Interpretation Comments

 

             U Alb (test code = U Alb) 16.7                                   



[facility]2020-08-06 14:47:00





             Test Item    Value        Reference Range Interpretation Comments

 

             U Alb/Crea (test code = U Alb/Crea) 146                            

        



[facility]2020-08-06 14:47:00





             Test Item    Value        Reference Range Interpretation Comments

 

             Vitamin D, 25-OH, Total (test code = 37                      

         



             Vitamin D, 25-OH, Total)                                        



[facility]2020-08-06 14:47:00





             Test Item    Value        Reference Range Interpretation Comments

 

             U Creat mg/dL (test code = U Creat 114                       

       



             mg/dL)                                              



[facility]2020-08-06 14:47:00





             Test Item    Value        Reference Range Interpretation Comments

 

             U Prot/Creat (test code = U Prot/Creat) 430                  

            



[facility]2020-08-06 14:47:00





             Test Item    Value        Reference Range Interpretation Comments

 

             U Prot/Creat (test code = U 0.430 1      0.021-0.161               



             Prot/Creat)                                         



[facility]2020-08-06 14:47:00





             Test Item    Value        Reference Range Interpretation Comments

 

             U Protein (test code = U Protein) 49           5-24                

      



[facility]2020-08-06 14:47:00





             Test Item    Value        Reference Range Interpretation Comments

 

             Glucose Lvl (test code = Glucose Lvl) 103          65-99           

          



[place] 256250-08-06 14:47:00





             Test Item    Value        Reference Range Interpretation Comments

 

             BUN (test code = BUN) 24           7-25                      



[place] 256250-08-06 14:47:00





             Test Item    Value        Reference Range Interpretation Comments

 

             Creatinine Lvl (test code = Creatinine 1.32         0.50-0.99      

           



             Lvl)                                                



[place] 256250-08-06 14:47:00





             Test Item    Value        Reference Range Interpretation Comments

 

             eGFR NON-AFR. AMERICAN (test code = 43                             

        



             eGFR NON-AFR. AMERICAN)                                        



[place] 256250-08-06 14:47:00





             Test Item    Value        Reference Range Interpretation Comments

 

             eGFR  (test code = eGFR 50                         

            



             )                                        



[place] 256250-08-06 14:47:00





             Test Item    Value        Reference Range Interpretation Comments

 

             B/C Ratio (test code = B/C Ratio) 18           6-22                

      



[place] 256250-08-06 14:47:00





             Test Item    Value        Reference Range Interpretation Comments

 

             Sodium Lvl (test code = Sodium Lvl) 138          135-146           

        



[place] 256250-08-06 14:47:00





             Test Item    Value        Reference Range Interpretation Comments

 

             Potassium Lvl (test code = Potassium 4.4          3.5-5.3          

         



             Lvl)                                                



[facility]2020-08-06 14:47:00





             Test Item    Value        Reference Range Interpretation Comments

 

             Chloride Lvl (test code = Chloride Lvl) 102                  

            



[place] 256250-08-06 14:47:00





             Test Item    Value        Reference Range Interpretation Comments

 

             CO2 (test code = CO2) 30           20-32                     



[place] 256250-08-06 14:47:00





             Test Item    Value        Reference Range Interpretation Comments

 

             Calcium Lvl (test code = Calcium Lvl) 9.4          8.6-10.4        

          



[place] 256250-08-06 14:47:00





             Test Item    Value        Reference Range Interpretation Comments

 

             Phosphorus (test code = Phosphorus) 4.8          2.5-4.5           

        



[place] [address]250-08-06 14:47:00





             Test Item    Value        Reference Range Interpretation Comments

 

             Albumin Lvl (test code = Albumin Lvl) 3.9          3.6-5.1         

          



[place] [address]650-08-06 14:47:00





             Test Item    Value        Reference Range Interpretation Comments

 

             Plt Count Estimated (test code = DECREASED                         

     



             Plt Count Estimated)                                        



[id number] 14:47:00





             Test Item    Value        Reference Range Interpretation Comments

 

             WBC X 10x3 (test code = WBC X 10x3) 7.2          3.8-10.8          

        



[place]650-08-06 14:47:00





             Test Item    Value        Reference Range Interpretation Comments

 

             RBC X 10x6 (test code = RBC X 10x6) 3.71         3.80-5.10         

        



[facility]QmxqzgdZNHZRBINJI8081-30-46 14:47:00





             Test Item    Value        Reference Range Interpretation Comments

 

             Hgb (test code = Hgb) 10.7         11.7-15.5                 



[place] 609650-08-06 14:47:00





             Test Item    Value        Reference Range Interpretation Comments

 

             Hct (test code = Hct) 33.9         35.0-45.0                 



[facility] HospitalApfwiabTXSKSMGVZB9798-27-36 14:47:00





             Test Item    Value        Reference Range Interpretation Comments

 

             MCV (test code = MCV) 91.4         80.0-100.0                



[facility] HospitalUcxeihlNPXOCMVDLW4167-90-30 14:47:00





             Test Item    Value        Reference Range Interpretation Comments

 

             MCH (test code = MCH) 28.8 pg      27.0-33.0                 



[facility] HospitalMqkrxjsOWCYOESSEI5517-05-74 14:47:00





             Test Item    Value        Reference Range Interpretation Comments

 

             MCHC (test code = MCHC) 31.6         32.0-36.0                 



[facility] HospitalQctuixeWNXQLEHSDV6901-43-45 14:47:00





             Test Item    Value        Reference Range Interpretation Comments

 

             RDW (test code = RDW) 13.9         11.0-15.0                 



[place] 609650-08-06 14:47:00





             Test Item    Value        Reference Range Interpretation Comments

 

             Platelet (test code = Platelet) 110          140-400               

    



[facility] HospitalXnkfweeIXRKXJDTIG9432-91-67 14:47:00





             Test Item    Value        Reference Range Interpretation Comments

 

             MPV (test code = MPV) 11.7         7.5-12.5                  



[place]650-08-06 14:47:00





             Test Item    Value        Reference Range Interpretation Comments

 

             Neutrophils # (test code = Neutrophils 5414         2846-3141      

           



             #)                                                  



[facility] HospitalNvjgpoqTLCVGMWJIH6012-72-97 14:47:00





             Test Item    Value        Reference Range Interpretation Comments

 

             Lymphocytes # (test code = Lymphocytes 1152         850-3900       

           



             #)                                                  



[facility] [place], TXApiztiiWGEFHXPEEV8574-00-27 14:47:00





             Test Item    Value        Reference Range Interpretation Comments

 

             Monocytes # (test code = Monocytes #) 461          200-950         

          



[facility]UDFSZJYQ4780[facility] 14[facility]47:[facility]





             Test Item    Value        Reference Range Interpretation Comments

 

             Eosinophils # (test code = Eosinophils 122                   

           



             #)                                                  



[facility]NjfiwjsHWCVHECGYC4481-29-87 14:47:00





             Test Item    Value        Reference Range Interpretation Comments

 

             Basophils # (test code 50           See_Comment                [Aut

omated message] The



             = Basophils #)                                        system which 

generated



                                                                 this result tra

nsmitted



                                                                 reference range

: <=200.



                                                                 The reference r

cheyenne was



                                                                 not used to int

erpret



                                                                 this result as



                                                                 normal/abnormal

.



[facility] HospitalGbjvnmmRDTGBZKBPT8871-03-90 14:47:00





             Test Item    Value        Reference Range Interpretation Comments

 

             Segs (test code = Segs) 75.2                                   



[facility] HospitalUzgqdsuGHXTPMTOZK5555-14-45 14:47:00





             Test Item    Value        Reference Range Interpretation Comments

 

             Lymphocytes (test code = Lymphocytes) 16.0                         

          



[facility] CenterJvjnatwWMVEETITCJ2266-77-07 14:47:00





             Test Item    Value        Reference Range Interpretation Comments

 

             Monocytes (test code = Monocytes) 6.4                              

      



[facility] CenterMuusnftOEQHNUDLZV9228-78-80 14:47:00





             Test Item    Value        Reference Range Interpretation Comments

 

             Eosinophils (test code = Eosinophils) 1.7                          

          



[facility] CenterDmtkfvrQFZAHLLDNH5479-84-28 14:47:00





             Test Item    Value        Reference Range Interpretation Comments

 

             Basophils (test code = Basophils) 0.7                              

      



[facility] [id number] 14:47:00





             Test Item    Value        Reference Range Interpretation Comments

 

             Result 2 (Urine Culture) See Result Comment                        

   



             (test code = Result 2                                        



             (Urine Culture))                                        



[facility] AND [id number] 14:47:00





             Test Item    Value        Reference Range Interpretation Comments

 

             UA Color (test code = UA Color) YELLOW                             

    



[facility] AND [id number] 14:47:00





             Test Item    Value        Reference Range Interpretation Comments

 

             UA Turbidity (test code = UA CLOUDY                                

 



             Turbidity)                                          



[facility] AND [id number] 14:47:00





             Test Item    Value        Reference Range Interpretation Comments

 

             UA Spec Grav (test code = UA Spec 1.017 1      1.001-1.035         

      



             Grav)                                               



[facility] [id number] 14:47:00





             Test Item    Value        Reference Range Interpretation Comments

 

             UA pH (test code = UA pH) 5.5 1        5.0-8.0                   



[facility] [id number] 14:47:00





             Test Item    Value        Reference Range Interpretation Comments

 

             UA Glucose (test code = UA Glucose) NEGATIVE                       

        



[facility] [id number] 14:47:00





             Test Item    Value        Reference Range Interpretation Comments

 

             UA Bili (test code = UA Bili) NEGATIVE                             

  



[facility] [id number] 14:47:00





             Test Item    Value        Reference Range Interpretation Comments

 

             UA Ketones (test code = UA Ketones) NEGATIVE                       

        



[facility] [id number] 14:47:00





             Test Item    Value        Reference Range Interpretation Comments

 

             UA Blood (test code = UA Blood) 1+                                 

    



[facility] [id number] 14:47:00





             Test Item    Value        Reference Range Interpretation Comments

 

             UA Protein (test code = UA Protein) 1+                             

        



[facility] [id number] 14:47:00





             Test Item    Value        Reference Range Interpretation Comments

 

             UA Nitrite (test code = UA Nitrite) POSITIVE                       

        



[facility] [id number] 14:47:00





             Test Item    Value        Reference Range Interpretation Comments

 

             UA Leuk Est (test code = UA Leuk Est) 2+                           

          



[facility] [id number] 14:47:00





             Test Item    Value        Reference Range Interpretation Comments

 

             UA WBC (test code = UA WBC) > OR = 60                              



[facility] [id number] 14:47:00





             Test Item    Value        Reference Range Interpretation Comments

 

             UA RBC (test code = UA RBC) 0-2                                    



[facility] [id number] 14:47:00





             Test Item    Value        Reference Range Interpretation Comments

 

             UA Sq Epi (test code = UA Sq Epi) NONE SEEN                        

      



[facility] [id number] 14:47:00





             Test Item    Value        Reference Range Interpretation Comments

 

             UA Bacteria (test code = UA Bacteria) MANY                         

          



[facility] [id number] 14:47:00





             Test Item    Value        Reference Range Interpretation Comments

 

             UA Hyal Cast (test code = UA Hyal NONE SEEN                        

      



             Cast)                                               



[facility] [place] [id number] 14:47:00





             Test Item    Value        Reference Range Interpretation Comments

 

             UA Reflex (test code CULTURE INDICATED -                           



             = UA Reflex) RESULTS TO FOLLOW                           



[place] [address]-08-06 14:47:00





             Test Item    Value        Reference Range Interpretation Comments

 

             U Creat mg/dL (test code = U Creat 114                       

       



             mg/dL)                                              



[place] [address]-08-06 14:47:00





             Test Item    Value        Reference Range Interpretation Comments

 

             U Alb (test code = U Alb) 16.7                                   



[place] [address]-08-06 14:47:00





             Test Item    Value        Reference Range Interpretation Comments

 

             U Alb/Crea (test code = U Alb/Crea) 146                            

        



[place] 256250-08-06 14:47:00





             Test Item    Value        Reference Range Interpretation Comments

 

             Vitamin D, 25-OH, Total (test code = 37                      

         



             Vitamin D, 25-OH, Total)                                        



[place] [address]-08-06 14:47:00





             Test Item    Value        Reference Range Interpretation Comments

 

             U Creat mg/dL (test code = U Creat 114                       

       



             mg/dL)                                              



[place] [address]-08-06 14:47:00





             Test Item    Value        Reference Range Interpretation Comments

 

             U Prot/Creat (test code = U Prot/Creat) 430                  

            



[facility]2020-08-06 14:47:00





             Test Item    Value        Reference Range Interpretation Comments

 

             U Prot/Creat (test code = U 0.430 1      0.021-0.161               



             Prot/Creat)                                         



[place] 256250-08-06 14:47:00





             Test Item    Value        Reference Range Interpretation Comments

 

             U Protein (test code = U Protein) 49           5-24                

      



[place] [address]-08-06 14:47:00





             Test Item    Value        Reference Range Interpretation Comments

 

             Glucose Lvl (test code = Glucose Lvl) 103          65-99           

          



[place] 256250-08-06 14:47:00





             Test Item    Value        Reference Range Interpretation Comments

 

             BUN (test code = BUN) 24           7-25                      



[place] 256250-08-06 14:47:00





             Test Item    Value        Reference Range Interpretation Comments

 

             Creatinine Lvl (test code = Creatinine 1.32         0.50-0.99      

           



             Lvl)                                                



[place] 256250-08-06 14:47:00





             Test Item    Value        Reference Range Interpretation Comments

 

             eGFR NON-AFR. AMERICAN (test code = 43                             

        



             eGFR NON-AFR. AMERICAN)                                        



[facility]2020-08-06 14:47:00





             Test Item    Value        Reference Range Interpretation Comments

 

             eGFR  (test code = eGFR 50                         

            



             )                                        



[facility]2020-08-06 14:47:00





             Test Item    Value        Reference Range Interpretation Comments

 

             B/C Ratio (test code = B/C Ratio) 18           6-22                

      



[place] 256250-08-06 14:47:00





             Test Item    Value        Reference Range Interpretation Comments

 

             Sodium Lvl (test code = Sodium Lvl) 138          135-146           

        



[place] 256250-08-06 14:47:00





             Test Item    Value        Reference Range Interpretation Comments

 

             Potassium Lvl (test code = Potassium 4.4          3.5-5.3          

         



             Lvl)                                                



[place] 256250-08-06 14:47:00





             Test Item    Value        Reference Range Interpretation Comments

 

             Chloride Lvl (test code = Chloride Lvl) 102                  

            



[place] 256250-08-06 14:47:00





             Test Item    Value        Reference Range Interpretation Comments

 

             CO2 (test code = CO2) 30           20-32                     



[place] 256250-08-06 14:47:00





             Test Item    Value        Reference Range Interpretation Comments

 

             Calcium Lvl (test code = Calcium Lvl) 9.4          8.6-10.4        

          



[place] 256250-08-06 14:47:00





             Test Item    Value        Reference Range Interpretation Comments

 

             Phosphorus (test code = Phosphorus) 4.8          2.5-4.5           

        



[place] 256250-08-06 14:47:00





             Test Item    Value        Reference Range Interpretation Comments

 

             Albumin Lvl (test code = Albumin Lvl) 3.9          3.6-5.1         

          



[place]650-08-06 14:47:00





             Test Item    Value        Reference Range Interpretation Comments

 

             Plt Count Estimated (test code = DECREASED                         

     



             Plt Count Estimated)                                        



[facility] HospitalIfeeekdKNGQGVUIIS2484-64-25 14:47:00





             Test Item    Value        Reference Range Interpretation Comments

 

             WBC X 10x3 (test code = WBC X 10x3) 7.2          3.8-10.8          

        



[place] 609650-08-06 14:47:00





             Test Item    Value        Reference Range Interpretation Comments

 

             RBC X 10x6 (test code = RBC X 10x6) 3.71         3.80-5.10         

        



[place] [address]-08-06 14:47:00





             Test Item    Value        Reference Range Interpretation Comments

 

             Hgb (test code = Hgb) 10.7         11.7-15.5                 



[place]650-08-06 14:47:00





             Test Item    Value        Reference Range Interpretation Comments

 

             Hct (test code = Hct) 33.9         35.0-45.0                 



[facility] HospitalJbdkhleLCSEYWHIXJ1116-64-61 14:47:00





             Test Item    Value        Reference Range Interpretation Comments

 

             MCV (test code = MCV) 91.4         80.0-100.0                



[facility] HospitalDjcdrlsCLYGIHMGMI8746-72-51 14:47:00





             Test Item    Value        Reference Range Interpretation Comments

 

             MCH (test code = MCH) 28.8 pg      27.0-33.0                 



[facility] HospitalDyfmezoUHQZQUBJSC8259-57-91 14:47:00





             Test Item    Value        Reference Range Interpretation Comments

 

             MCHC (test code = MCHC) 31.6         32.0-36.0                 



[facility] HospitalFiowbhcYUCJFDAMGV4983-08-21 14:47:00





             Test Item    Value        Reference Range Interpretation Comments

 

             RDW (test code = RDW) 13.9         11.0-15.0                 



[place] [address]-08-06 14:47:00





             Test Item    Value        Reference Range Interpretation Comments

 

             Platelet (test code = Platelet) 110          140-400               

    



[facility] HospitalDuamilpDDOBWNRNAB6108-03-81 14:47:00





             Test Item    Value        Reference Range Interpretation Comments

 

             MPV (test code = MPV) 11.7         7.5-12.5                  



[facility] HospitalWrbuwueJHDXHJXYKD7660-09-26 14:47:00





             Test Item    Value        Reference Range Interpretation Comments

 

             Neutrophils # (test code = Neutrophils 5414         7497-0671      

           



             #)                                                  



[facility] HospitalSkjrzvdBIPCSNNUPR9965-32-40 14:47:00





             Test Item    Value        Reference Range Interpretation Comments

 

             Lymphocytes # (test code = Lymphocytes 1152         850-3900       

           



             #)                                                  



[facility] HospitalYfugkjoFFWCQYKLUD3373-52-20 14:47:00





             Test Item    Value        Reference Range Interpretation Comments

 

             Monocytes # (test code = Monocytes #) 461          200-950         

          



[facility] HospitalGydikcyMGLPFSCXZR8884-62-11 14:47:00





             Test Item    Value        Reference Range Interpretation Comments

 

             Eosinophils # (test code = Eosinophils 122                   

           



             #)                                                  



[facility] HospitalCqvttacSMGLDCPZGC7771-50-26 14:47:00





             Test Item    Value        Reference Range Interpretation Comments

 

             Basophils # (test code 50           See_Comment                [Aut

omated message] The



             = Basophils #)                                        system which 

generated



                                                                 this result tra

nsmitted



                                                                 reference range

: <=200.



                                                                 The reference r

cheyenne was



                                                                 not used to int

erpret



                                                                 this result as



                                                                 normal/abnormal

.



[facility] [place], [place]BddncioKFEGSACZDR8827-73-05 14:47:00





             Test Item    Value        Reference Range Interpretation Comments

 

             Segs (test code = Segs) 75.2                                   



[facility] CenterKrhmuyxWFTBKTFMRX2293-76-77 14:47:00





             Test Item    Value        Reference Range Interpretation Comments

 

             Lymphocytes (test code = Lymphocytes) 16.0                         

          



[facility] [place], TXCbqznprBKFXBXBRSS9922-20-52 14:47:00





             Test Item    Value        Reference Range Interpretation Comments

 

             Monocytes (test code = Monocytes) 6.4                              

      



[facility] [place], TXIhevjwpSTWWYXQKML2234-80-32 14:47:00





             Test Item    Value        Reference Range Interpretation Comments

 

             Eosinophils (test code = Eosinophils) 1.7                          

          



[facility] [place], TXYgygttjXSJZZCEYZK3123-18-25 14:47:00





             Test Item    Value        Reference Range Interpretation Comments

 

             Basophils (test code = Basophils) 0.7                              

      



[facility] [id number] 14:47:00





             Test Item    Value        Reference Range Interpretation Comments

 

             Result 2 (Urine Culture) See Result Comment                        

   



             (test code = Result 2                                        



             (Urine Culture))                                        



[facility] AND [id number] 14:47:00





             Test Item    Value        Reference Range Interpretation Comments

 

             UA Color (test code = UA Color) YELLOW                             

    



[facility] AND [id number] 14:47:00





             Test Item    Value        Reference Range Interpretation Comments

 

             UA Turbidity (test code = UA CLOUDY                                

 



             Turbidity)                                          



[facility] AND [id number] 14:47:00





             Test Item    Value        Reference Range Interpretation Comments

 

             UA Spec Grav (test code = UA Spec 1.017 1      1.001-1.035         

      



             Grav)                                               



[facility] [place] [id number] 14:47:00





             Test Item    Value        Reference Range Interpretation Comments

 

             UA pH (test code = UA pH) 5.5 1        5.0-8.0                   



[facility] [place] [id number] 14:47:00





             Test Item    Value        Reference Range Interpretation Comments

 

             UA Glucose (test code = UA Glucose) NEGATIVE                       

        



[facility] [place] [id number] 14:47:00





             Test Item    Value        Reference Range Interpretation Comments

 

             UA Bili (test code = UA Bili) NEGATIVE                             

  



[facility] [place] [id number] 14:47:00





             Test Item    Value        Reference Range Interpretation Comments

 

             UA Ketones (test code = UA Ketones) NEGATIVE                       

        



[facility] [id number] 14:47:00





             Test Item    Value        Reference Range Interpretation Comments

 

             UA Blood (test code = UA Blood) 1+                                 

    



[facility] [id number] 14:47:00





             Test Item    Value        Reference Range Interpretation Comments

 

             UA Protein (test code = UA Protein) 1+                             

        



[facility] [id number] 14:47:00





             Test Item    Value        Reference Range Interpretation Comments

 

             UA Nitrite (test code = UA Nitrite) POSITIVE                       

        



[facility] [id number] 14:47:00





             Test Item    Value        Reference Range Interpretation Comments

 

             UA Leuk Est (test code = UA Leuk Est) 2+                           

          



[facility] [id number] 14:47:00





             Test Item    Value        Reference Range Interpretation Comments

 

             UA WBC (test code = UA WBC) > OR = 60                              



[facility] [id number] 14:47:00





             Test Item    Value        Reference Range Interpretation Comments

 

             UA RBC (test code = UA RBC) 0-2                                    



[facility] [id number] 14:47:00





             Test Item    Value        Reference Range Interpretation Comments

 

             UA Sq Epi (test code = UA Sq Epi) NONE SEEN                        

      



[facility] [id number] 14:47:00





             Test Item    Value        Reference Range Interpretation Comments

 

             UA Bacteria (test code = UA Bacteria) MANY                         

          



[facility] [id number] 14:47:00





             Test Item    Value        Reference Range Interpretation Comments

 

             UA Hyal Cast (test code = UA Hyal NONE SEEN                        

      



             Cast)                                               



[facility] [place] [id number] 14:47:00





             Test Item    Value        Reference Range Interpretation Comments

 

             UA Reflex (test code CULTURE INDICATED -                           



             = UA Reflex) RESULTS TO FOLLOW                           



[facility] [place] [id number] 14:47:00





             Test Item    Value        Reference Range Interpretation Comments

 

             U Creat mg/dL (test code = U Creat 114                       

       



             mg/dL)                                              



[facility] [place] [id number] 14:47:00





             Test Item    Value        Reference Range Interpretation Comments

 

             U Alb (test code = U Alb) 16.7                                   



[facility] [place] [id number] 14:47:00





             Test Item    Value        Reference Range Interpretation Comments

 

             U Alb/Crea (test code = U Alb/Crea) 146                            

        



[facility] PROTEIN ELECTROPHORESIS-24HR [id number] 08:01:00





             Test Item    Value        Reference Range Interpretation Comments

 

             CREATININE, 24 HOUR 1.45 g/24 h  0.50-2.15                 



             URINE (test code =                                        



             98108981)                                           

 

             PROTEIN/CREATININE 292 mg/g creat < OR = 114   H            



             RATION (test code =                                        



             10041253)                                           

 

             PROTEIN, TOTAL 24 HR  mg/24 h  <150         H            TEST

 PERFORMED



             (test code = 34126361)                                        AT:[facility]

[facility]



                                                                 [facility]

IN



                                                                 [address]



                                                                 [id number] FELIX MD

 

             ALBUMIN (test code = 35 %                                   



             34994777)                                           

 

             ALPHA-1-GLOBULINS (test 10 %                                   



             code = 26582131)                                        

 

             ALPHA-2-GLOBULINS (test 12 %                                   



             code = 15818894)                                        

 

             BETA GLOBULINS (test 25 %                                   



             code = 78278192)                                        

 

             GAMMA GLOBULINS (test 18 %                                   



             code = 12816093)                                        

 

             INTERPRETATION (test                                        Albumin

 and



             code = 61587658)                                        various aba

bulin



                                                                 fractions



                                                                 detected on



                                                                 proteinelectrop

ho



                                                                 resis. No



                                                                 abnormal protei

n



                                                                 bands



                                                                 (Bence-Jonespro

te



                                                                 inuria)



                                                                 detected.TEST



                                                                 PERFORMED



                                                                 AT:[employer]

IN



                                                                 [address]



                                                                 [id number] FELIX MD



NEUTROPHIL CYTOPLASMIC [id number] 05:19:00





             Test Item    Value        Reference Range Interpretation Comments

 

             ANCA SCREEN (test NEGATIVE     NEGATIVE                  ANCA Scree

n includes



             code = 28010881)                                        evaluation 

for p-ANCA,



                                                                 c-ANCA andatypi

tayla p-ANCA.



                                                                 A positive ANCA

 screen



                                                                 reflexes to tit

erand



                                                                 pattern(s), e.g

.,



                                                                 cytoplasmic pat

tern



                                                                 (c-ANCA),perinu

clear



                                                                 pattern (p-ANCA

), or



                                                                 atypical p-ANCA



                                                                 pattern.c-ANCA 

and p-ANCA



                                                                 are observed in

 vasculitis,



                                                                 whereasatypical

 p-ANCA is



                                                                 observed in IBD



                                                                 (Inflammatory



                                                                 BowelDisease). 

Atypical



                                                                 p-ANCA is detec

kolby in about



                                                                 55% to 80% ofpa

tients with



                                                                 ulcerative coli

tis but only



                                                                 5% to 25% ofpat

ients with



                                                                 Crohn's disease

.TEST



                                                                 PERFORMED AT:[email or web address]

EST



                                                                 [facility]ST[facility]



                                                                 [id number] [place]



                                                                 [id number] MARIEE MD,PHD

,RAMILA



COMPREHENSIVE METABOLIC [id number] 06:25:00





             Test Item    Value        Reference Range Interpretation Comments

 

             GLUCOSE (test code = 06D) 115 mg/dL           H            

 

             SODIUM (test code = 01A) 137 mmol/L   136-145                   

 

             POTASSIUM (test code = 01B) 3.3 mmol/L   3.6-5.1      L            

 

             CHLORIDE (test code = 04A) 97 mmol/L           L            

 

             CO2 (test code = 02A) 32 mmol/L    22-32                     

 

             ANION GAP (test code = ANG) 11.3 mmol/L                            

 

             BUN (test code = 05D) 36 mg/dL     7-18         H            

 

             CREATININE (test code = 03E) 1.4 mg/dL    0.4-1.1      H           

 

 

             BUN/CREA (test code = BCR) 25           12-20        H            

 

             CALCIUM (test code = 09D) 8.8 mg/dL    8.3-9.5                   

 

             BILI TOTAL (test code = 11A) 1.2 mg/dL    0.2-1.0      H           

 

 

             PROTEIN (test code = 07D) 6.5 g/dL     6.4-8.2                   

 

             ALBUMIN (test code = 08D) 2.9 g/dL     3.5-4.8      L            

 

             GLOBULIN (test code = GLB) 3.6 g/dL     1.5-3.8                   

 

             ALB/GLOB (test code = AGRR) 0.8          1.0-2.6      L            

 

             ALK PHOS (test code = 35A) 97 IU/L                          

 

             AST (test code = 30A) 15 IU/L      <=42                      

 

             ALT (test code = 31A) 35 IU/L      <=78                      



CBC (INCLUDES AUTOMATED DIFFERENTIAL)2019 06:06:00





             Test Item    Value        Reference Range Interpretation Comments

 

             WBC (test code = WBC) 6.9 10\S\3/uL 4.5-11.0                  

 

             RBC (test code = RBC) 3.56 10\S\6/uL 4.20-5.60    L            

 

             HGB (test code = HBG) 10.4 g/dL    12.0-15.5    L            

 

             HCT (test code = HCT) 32.1 %       35.0-44.0    L            

 

             MCV (test code = MCV) 90.2 fL      81.0-99.0                 

 

             MCH (test code = MCH) 29.2 pg      27.0-31.0                 

 

             MCHC (test code = MCHC) 32.4 g/dL    32.0-36.0                 

 

             RDW (test code = RDW) 15.0 %       11.5-14.5    H            

 

             PLT (test code = PLT) 158 10\S\3/uL 130-400                   

 

             MPV (test code = MPV) 10.7 fL      9.4-12.4                  

 

             NEUTROP # (test code = NE#) 4.4 10\S\3/uL 1.6-8.0                  

 

 

             LYMPH # (test code = LY#) 1.8 10\S\3/uL 1.1-3.5                   

 

             MONOCYTE # (test code = MO#) 0.5 10\S\3/uL 0.0-1.1                 

  

 

             EOSINOPH # (test code = EO#) 0.2 10\S\3/uL 0.0-0.7                 

  

 

             BASOPHIL # (test code = BA#) 0.0 10\S\3/uL 0.0-0.3                 

  

 

             IG # (test code = IG#) 0.03 10\S\3/uL 0.00-0.06                 

 

             NRBC # (test code = NRBC#) 0.00 10\S\3/uL 0.00-0.01                

 

 

             NEUTROPH % (test code = NE%) 64.0 %       35.0-73.0                

 

 

             LYMPH % (test code = LY%) 26.1 %       20.0-55.0                 

 

             MONO % (test code = MO%) 6.5 %        2.5-10.0                  

 

             EOSINOPH % (test code = EO%) 2.6 %        0.0-5.0                  

 

 

             BASOPHIL % (test code = BA%) 0.4 %        0.0-2.0                  

 

 

             IG % (test code = IG%) 0.4 %        0.0-0.8                   

 

             NRBC% (test code = NRBC%) 0.0 %        0.0-0.2                   

 

             MANDIFF (test code = MDIFF) NO           NO                        

 

             RBC MORPH (test code = RBCMOR)              NORMAL                 

   



URINE [id number] 09:53:00





             Test Item    Value        Reference Range Interpretation Comments

 

             Isolate 1 (test code = Proteus mirabilis              A            



             ISO1)                                               

 

             ampicillin (test code = am)  ug/mL                    S            

 

             ampicillin/sulbactam (test  ug/mL                    S            



             code = ams)                                         

 

             piperacillin/tazobactam  ug/mL                    S            



             (test code = tzp)                                        

 

             ceftazidime (test code =  ug/mL                    S            



             jeannine)                                                

 

             ceftriaxone1 (test code =  ug/mL                    S            



             ctr)                                                

 

             cefepime (test code = fep)  ug/mL                    S            

 

             aztreonam (test code = azm)  ug/mL                    S            

 

             ertapenem (test code = etp)  ug/mL                    S            

 

             meropenem (test code = mem)  ug/mL                    S            

 

             gentamicin (test code = gm)  ug/mL                    S            

 

             tobramycin (test code =  ug/mL                    S            



             tob)                                                

 

             levofloxacin (test code =  ug/mL                    S            



             lev)                                                

 

             trimethoprim/sulfamethoxazo  ug/mL                    S            



             le (test code = sxt)                                        



PARTHYROID HORMONE (INTACT)2019 08:24:00





             Test Item    Value        Reference Range Interpretation Comments

 

             PTH INTACT (test code 166.6 pg/mL  18.4-80.1    H            



             = A85)                                              

 

             Ref Range Change ***** Please note the                           



             (test code = REF change in reference                           



             RANGE)       range ***                              



VITAMIN D TOTAL 25 (OH)2019 07:15:00





             Test Item    Value        Reference Range Interpretation Comments

 

             VITAMIN D 25(OH) (test code = VD) 36.0 ng/mL   30.0-100.0          

      



SERUM PROTEIN [id number] 06:20:00





             Test Item    Value        Reference Range Interpretation Comments

 

             PROTEIN TOTAL (test 5.7 g/dL     6.1-8.1      L            TEST PER

FORMED AT:[facility]



             code = 38404740)                                        DIAGNOSTICS

[id number]



                                                                 [facility], [place]



                                                                 [id number] FELIX MD

 

             ALBUMIN (test code = 3.2 g/dL     3.8-4.8      L            



             90579072)                                           

 

             ALPHA-1-GLOBULINS 0.4 g/dL     0.2-0.3      H            



             (test code =                                        



             22594949)                                           

 

             ALPHA-2-GLOBULINS 0.8 g/dL     0.5-0.9                   



             (test code =                                        



             07880665)                                           

 

             BETA 1 GLOBULIN (test 0.5 g/dL     0.4-0.6                   



             code = 95518032)                                        

 

             BETA 2 GLOBULIN (test 0.2 g/dL     0.2-0.5                   



             code = 43618035)                                        

 

             GAMMA GLOBULINS (test 0.6 g/dL     0.8-1.7      L            



             code = 40349706)                                        

 

             INTERPRETATION (test                                        Pattern

 consistent with



             code = 43032392)                                        an acute ph

ase



                                                                 reactionConsist

ent with



                                                                 hypogammaglobul

inemia.



                                                                 Serum free ligh

tchains



                                                                 or urine immuno

fixation



                                                                 should be consi

dered



                                                                 ifplasma cell d

yscrasias



                                                                 are a possible



                                                                 clinicaldiagnos

is.TEST



                                                                 PERFORMED AT:

[facility]



                                                                 [facility]

[id number]



                                                                 [facility]

[place], [place]



                                                                 [id number] FELIX MD



[id number] 06:13:00





             Test Item    Value        Reference Range Interpretation Comments

 

             MAGNESIUM (test code = 48A) 1.7 mg/dL    1.8-2.4      L            



COMPREHENSIVE METABOLIC [id number] 06:13:00





             Test Item    Value        Reference Range Interpretation Comments

 

             GLUCOSE (test code = 06D) 110 mg/dL           H            

 

             SODIUM (test code = 01A) 140 mmol/L   136-145                   

 

             POTASSIUM (test code = 01B) 3.1 mmol/L   3.6-5.1      L            

 

             CHLORIDE (test code = 04A) 96 mmol/L           L            

 

             CO2 (test code = 02A) 34 mmol/L    22-32        H            

 

             ANION GAP (test code = ANG) 13.1 mmol/L                            

 

             BUN (test code = 05D) 33 mg/dL     7-18         H            

 

             CREATININE (test code = 03E) 1.5 mg/dL    0.4-1.1      H           

 

 

             BUN/CREA (test code = BCR) 22           12-20        H            

 

             CALCIUM (test code = 09D) 9.1 mg/dL    8.3-9.5                   

 

             BILI TOTAL (test code = 11A) 1.4 mg/dL    0.2-1.0      H           

 

 

             PROTEIN (test code = 07D) 7.0 g/dL     6.4-8.2                   

 

             ALBUMIN (test code = 08D) 3.2 g/dL     3.5-4.8      L            

 

             GLOBULIN (test code = GLB) 3.8 g/dL     1.5-3.8                   

 

             ALB/GLOB (test code = AGRR) 0.8          1.0-2.6      L            

 

             ALK PHOS (test code = 35A) 111 IU/L                         

 

             AST (test code = 30A) 18 IU/L      <=42                      

 

             ALT (test code = 31A) 43 IU/L      <=78                      



PHOSPHORUS (P04)2019 06:13:00





             Test Item    Value        Reference Range Interpretation Comments

 

             PHOSPHORUS (test code = 43D) 4.0 mg/dL    2.7-4.6                  

 



CBC (INCLUDES AUTOMATED DIFFERENTIAL)2019 05:48:00





             Test Item    Value        Reference Range Interpretation Comments

 

             WBC (test code = WBC) 9.4 10\S\3/uL 4.5-11.0                  

 

             RBC (test code = RBC) 3.73 10\S\6/uL 4.20-5.60    L            

 

             HGB (test code = HBG) 10.8 g/dL    12.0-15.5    L            

 

             HCT (test code = HCT) 33.8 %       35.0-44.0    L            

 

             MCV (test code = MCV) 90.6 fL      81.0-99.0                 

 

             MCH (test code = MCH) 29.0 pg      27.0-31.0                 

 

             MCHC (test code = MCHC) 32.0 g/dL    32.0-36.0                 

 

             RDW (test code = RDW) 15.1 %       11.5-14.5    H            

 

             PLT (test code = PLT) 189 10\S\3/uL 130-400                   

 

             MPV (test code = MPV) 11.8 fL      9.4-12.4                  

 

             NEUTROP # (test code = NE#) 7.0 10\S\3/uL 1.6-8.0                  

 

 

             LYMPH # (test code = LY#) 1.6 10\S\3/uL 1.1-3.5                   

 

             MONOCYTE # (test code = MO#) 0.7 10\S\3/uL 0.0-1.1                 

  

 

             EOSINOPH # (test code = EO#) 0.1 10\S\3/uL 0.0-0.7                 

  

 

             BASOPHIL # (test code = BA#) 0.0 10\S\3/uL 0.0-0.3                 

  

 

             IG # (test code = IG#) 0.06 10\S\3/uL 0.00-0.06                 

 

             NRBC # (test code = NRBC#) 0.00 10\S\3/uL 0.00-0.01                

 

 

             NEUTROPH % (test code = NE%) 74.5 %       35.0-73.0    H           

 

 

             LYMPH % (test code = LY%) 16.5 %       20.0-55.0    L            

 

             MONO % (test code = MO%) 7.0 %        2.5-10.0                  

 

             EOSINOPH % (test code = EO%) 1.1 %        0.0-5.0                  

 

 

             BASOPHIL % (test code = BA%) 0.3 %        0.0-2.0                  

 

 

             IG % (test code = IG%) 0.6 %        0.0-0.8                   

 

             NRBC% (test code = NRBC%) 0.0 %        0.0-0.2                   

 

             MANDIFF (test code = MDIFF) NO           NO                        

 

             RBC MORPH (test code = RBCMOR)              NORMAL                 

   



COMPREHENSIVE METABOLIC [id number] 05:30:00





             Test Item    Value        Reference Range Interpretation Comments

 

             GLUCOSE (test code = 06D) 122 mg/dL           H            

 

             SODIUM (test code = 01A) 140 mmol/L   136-145                   

 

             POTASSIUM (test code = 01B) 3.8 mmol/L   3.6-5.1                   

 

             CHLORIDE (test code = 04A) 100 mmol/L                       

 

             CO2 (test code = 02A) 32 mmol/L    22-32                     

 

             ANION GAP (test code = ANG) 11.8 mmol/L                            

 

             BUN (test code = 05D) 29 mg/dL     7-18         H            

 

             CREATININE (test code = 03E) 1.5 mg/dL    0.4-1.1      H           

 

 

             BUN/CREA (test code = BCR) 20           12-20                     

 

             CALCIUM (test code = 09D) 9.0 mg/dL    8.3-9.5                   

 

             BILI TOTAL (test code = 11A) 1.3 mg/dL    0.2-1.0      H           

 

 

             PROTEIN (test code = 07D) 7.1 g/dL     6.4-8.2                   

 

             ALBUMIN (test code = 08D) 3.5 g/dL     3.5-4.8                   

 

             GLOBULIN (test code = GLB) 3.6 g/dL     1.5-3.8                   

 

             ALB/GLOB (test code = AGRR) 1.0          1.0-2.6                   

 

             ALK PHOS (test code = 35A) 119 IU/L                         

 

             AST (test code = 30A) 22 IU/L      <=42                      

 

             ALT (test code = 31A) 49 IU/L      <=78                      



CBC (INCLUDES AUTOMATED DIFFERENTIAL)2019 05:18:00





             Test Item    Value        Reference Range Interpretation Comments

 

             WBC (test code = WBC) 9.0 10\S\3/uL 4.5-11.0                  

 

             RBC (test code = RBC) 3.94 10\S\6/uL 4.20-5.60    L            

 

             HGB (test code = HBG) 11.4 g/dL    12.0-15.5    L            

 

             HCT (test code = HCT) 35.8 %       35.0-44.0                 

 

             MCV (test code = MCV) 90.9 fL      81.0-99.0                 

 

             MCH (test code = MCH) 28.9 pg      27.0-31.0                 

 

             MCHC (test code = MCHC) 31.8 g/dL    32.0-36.0    L            

 

             RDW (test code = RDW) 14.8 %       11.5-14.5    H            

 

             PLT (test code = PLT) 164 10\S\3/uL 130-400                   

 

             MPV (test code = MPV) 11.6 fL      9.4-12.4                  

 

             NEUTROP # (test code = NE#) 6.8 10\S\3/uL 1.6-8.0                  

 

 

             LYMPH # (test code = LY#) 1.4 10\S\3/uL 1.1-3.5                   

 

             MONOCYTE # (test code = MO#) 0.6 10\S\3/uL 0.0-1.1                 

  

 

             EOSINOPH # (test code = EO#) 0.1 10\S\3/uL 0.0-0.7                 

  

 

             BASOPHIL # (test code = BA#) 0.0 10\S\3/uL 0.0-0.3                 

  

 

             IG # (test code = IG#) 0.06 10\S\3/uL 0.00-0.06                 

 

             NRBC # (test code = NRBC#) 0.00 10\S\3/uL 0.00-0.01                

 

 

             NEUTROPH % (test code = NE%) 75.6 %       35.0-73.0    H           

 

 

             LYMPH % (test code = LY%) 15.9 %       20.0-55.0    L            

 

             MONO % (test code = MO%) 6.5 %        2.5-10.0                  

 

             EOSINOPH % (test code = EO%) 0.9 %        0.0-5.0                  

 

 

             BASOPHIL % (test code = BA%) 0.4 %        0.0-2.0                  

 

 

             IG % (test code = IG%) 0.7 %        0.0-0.8                   

 

             NRBC% (test code = NRBC%) 0.0 %        0.0-0.2                   

 

             MANDIFF (test code = MDIFF) NO           NO                        

 

             RBC MORPH (test code = RBCMOR)              NORMAL                 

   



URINALYSIS WITH [id number] 06:44:00





             Test Item    Value        Reference Range Interpretation Comments

 

             COLOR (test code = COLU) YELLOW       YELLOW                    

 

             CLARITY (test code = CLA) CLOUDY       CLEAR        A            

 

             GLUCOSE UR (test code = UA NEGATIVE     NEGATIVE                  



             GLUCOSE)                                            

 

             BILI UR (test code = BILE) NEGATIVE     NEGATIVE                  

 

             KETONES UR (test code = ACE) NEGATIVE     NEGATIVE                 

 

 

             SP GRAVITY (test code = SPGR) 1.014        1.005-1.030             

  

 

             PH UR (test code = PH) 6.0          4.5-8.0                   

 

             PROTEIN UR (test code = PU) TRACE        NEGATIVE     A            

 

             UROBIL UR (test code = UROQ) 0.2 EU/dL    0.2-1.0                  

 

 

             NITRITE UR (test code = NEGATIVE     NEGATIVE                  



             NITRITE)                                            

 

             BLOOD UR (test code = UA BLOOD) TRACE        NEGATIVE     A        

    

 

             LEUK ES UR (test code = LEUK) 2+           NEGATIVE     A          

  

 

             WBC UR (test code = UWBC) 12 /HPF      0-5          H            

 

             RBC UR (test code = URBC) 1 /HPF       0-2                       

 

             EPITH UR (test code = UEPC) FEW /LPF     FEW                       

 

             BACTERIA UR (test code = UBACT) MODERATE /HPF NONE         A       

     

 

             CAST UR (test code = CAST)  /LPF        NONE                      

 

             CRYSTAL UR (test code = CRYU)  / LPF       NONE                    

  

 

             MUCUS UR (test code = MUC)  / HPF       NONE                      

 

             AMORPH UR (test code = YASMEEN)  / HPF       NONE                     

 

 

             TRICH UR (test code = UTRICH)  /HPF        NONE                    

  

 

             YEAST UR (test code = UY)  /HPF        NONE                      

 

             SPERM UR (test code = USPERM)  /HPF        NONE                    

  



COMPREHENSIVE METABOLIC [id number] 05:24:00





             Test Item    Value        Reference Range Interpretation Comments

 

             GLUCOSE (test code = 06D) 105 mg/dL           H            

 

             SODIUM (test code = 01A) 138 mmol/L   136-145                   

 

             POTASSIUM (test code = 01B) 4.0 mmol/L   3.6-5.1                   

 

             CHLORIDE (test code = 04A) 104 mmol/L                       

 

             CO2 (test code = 02A) 25 mmol/L    22-32                     

 

             ANION GAP (test code = ANG) 13.0 mmol/L                            

 

             BUN (test code = 05D) 29 mg/dL     7-18         H            

 

             CREATININE (test code = 03E) 1.5 mg/dL    0.4-1.1      H           

 

 

             BUN/CREA (test code = BCR) 19           12-20                     

 

             CALCIUM (test code = 09D) 8.4 mg/dL    8.3-9.5                   

 

             BILI TOTAL (test code = 11A) 0.7 mg/dL    0.2-1.0                  

 

 

             PROTEIN (test code = 07D) 6.2 g/dL     6.4-8.2      L            

 

             ALBUMIN (test code = 08D) 3.1 g/dL     3.5-4.8      L            

 

             GLOBULIN (test code = GLB) 3.1 g/dL     1.5-3.8                   

 

             ALB/GLOB (test code = AGRR) 1.0          1.0-2.6                   

 

             ALK PHOS (test code = 35A) 104 IU/L                         

 

             AST (test code = 30A) 22 IU/L      <=42                      

 

             ALT (test code = 31A) 42 IU/L      <=78                      



CBC (INCLUDES AUTOMATED DIFFERENTIAL)2019 05:07:00





             Test Item    Value        Reference Range Interpretation Comments

 

             WBC (test code = WBC) 8.6 10\S\3/uL 4.5-11.0                  

 

             RBC (test code = RBC) 3.72 10\S\6/uL 4.20-5.60    L            

 

             HGB (test code = HBG) 10.8 g/dL    12.0-15.5    L            

 

             HCT (test code = HCT) 33.7 %       35.0-44.0    L            

 

             MCV (test code = MCV) 90.6 fL      81.0-99.0                 

 

             MCH (test code = MCH) 29.0 pg      27.0-31.0                 

 

             MCHC (test code = MCHC) 32.0 g/dL    32.0-36.0                 

 

             RDW (test code = RDW) 14.7 %       11.5-14.5    H            

 

             PLT (test code = PLT) 152 10\S\3/uL 130-400                   

 

             MPV (test code = MPV) 11.4 fL      9.4-12.4                  

 

             NEUTROP # (test code = NE#) 6.2 10\S\3/uL 1.6-8.0                  

 

 

             LYMPH # (test code = LY#) 1.6 10\S\3/uL 1.1-3.5                   

 

             MONOCYTE # (test code = MO#) 0.5 10\S\3/uL 0.0-1.1                 

  

 

             EOSINOPH # (test code = EO#) 0.2 10\S\3/uL 0.0-0.7                 

  

 

             BASOPHIL # (test code = BA#) 0.1 10\S\3/uL 0.0-0.3                 

  

 

             IG # (test code = IG#) 0.03 10\S\3/uL 0.00-0.06                 

 

             NRBC # (test code = NRBC#) 0.00 10\S\3/uL 0.00-0.01                

 

 

             NEUTROPH % (test code = NE%) 72.8 %       35.0-73.0                

 

 

             LYMPH % (test code = LY%) 18.5 %       20.0-55.0    L            

 

             MONO % (test code = MO%) 5.8 %        2.5-10.0                  

 

             EOSINOPH % (test code = EO%) 2.0 %        0.0-5.0                  

 

 

             BASOPHIL % (test code = BA%) 0.6 %        0.0-2.0                  

 

 

             IG % (test code = IG%) 0.3 %        0.0-0.8                   

 

             NRBC% (test code = NRBC%) 0.0 %        0.0-0.2                   

 

             MANDIFF (test code = MDIFF) NO           NO                        

 

             RBC MORPH (test code = RBCMOR)              NORMAL                 

   



U/S KIDNEY (RENAL)2019 23:20:42LOCATION: [id number]: 63-year-old female 
who presents with acute nontraumatic kidney injury.COMMENT:Sonographic imaging 
of this patient's retroperitoneum was performed.The right kidney measures 9.9 x 
5.9 x 6.4 cm with a 13 mm cortical thickness. Acyst is seen in the upper pole 
measuring 23 x 19 x 19 mm, and a second cyst isseen in the lower pole measuring 
18 x 16 x 16 mm.The left kidney measures 9.4 x 5.3 x 5.6 cm with a 11 mm 
cortical thickness. Nocysts or masses are seen in the left kidney.Cortical 
echotexture of the kidneys otherwise is unremarkable.There is no evidence of 
hydronephrosis of either kidney.In the urinary bladder only the left urine jet 
was observed on the color flowstudy. The bladder otherwise is 
unremarkable.IMPRESSION:Cystic changes are seen in the upper pole and lower pole
ofthis patient'sright kidney. No gross abnormalities are seen elsewhere in 
either kidney.The urinary bladder is unremarkable. Only the left urine jet was 
observed onthe color flow study.TROPONIN -13-36 07:14:00





             Test Item    Value        Reference Range Interpretation Comments

 

             TROPONIN I (test code = A84) <0.015 ng/mL 0.000-0.045              

 



COMPREHENSIVE METABOLIC [id number] 05:28:00





             Test Item    Value        Reference Range Interpretation Comments

 

             GLUCOSE (test code = 06D) 110 mg/dL           H            

 

             SODIUM (test code = 01A) 138 mmol/L   136-145                   

 

             POTASSIUM (test code = 01B) 3.9 mmol/L   3.6-5.1                   

 

             CHLORIDE (test code = 04A) 106 mmol/L                       

 

             CO2 (test code = 02A) 24 mmol/L    22-32                     

 

             ANION GAP (test code = ANG) 11.9 mmol/L                            

 

             BUN (test code = 05D) 22 mg/dL     7-18         H            

 

             CREATININE (test code = 03E) 1.5 mg/dL    0.4-1.1      H           

 

 

             BUN/CREA (test code = BCR) 15           12-20                     

 

             CALCIUM (test code = 09D) 8.2 mg/dL    8.3-9.5      L            

 

             BILI TOTAL (test code = 11A) 0.6 mg/dL    0.2-1.0                  

 

 

             PROTEIN (test code = 07D) 6.0 g/dL     6.4-8.2      L            

 

             ALBUMIN (test code = 08D) 2.9 g/dL     3.5-4.8      L            

 

             GLOBULIN (test code = GLB) 3.1 g/dL     1.5-3.8                   

 

             ALB/GLOB (test code = AGRR) 0.9          1.0-2.6      L            

 

             ALK PHOS (test code = 35A) 96 IU/L                          

 

             AST (test code = 30A) 19 IU/L      <=42                      

 

             ALT (test code = 31A) 35 IU/L      <=78                      



CBC (INCLUDES AUTOMATED DIFFERENTIAL)2019 05:14:00





             Test Item    Value        Reference Range Interpretation Comments

 

             WBC (test code = WBC) 7.0 10\S\3/uL 4.5-11.0                  

 

             RBC (test code = RBC) 3.64 10\S\6/uL 4.20-5.60    L            

 

             HGB (test code = HBG) 10.6 g/dL    12.0-15.5    L            

 

             HCT (test code = HCT) 33.5 %       35.0-44.0    L            

 

             MCV (test code = MCV) 92.0 fL      81.0-99.0                 

 

             MCH (test code = MCH) 29.1 pg      27.0-31.0                 

 

             MCHC (test code = MCHC) 31.6 g/dL    32.0-36.0    L            

 

             RDW (test code = RDW) 14.9 %       11.5-14.5    H            

 

             PLT (test code = PLT) 145 10\S\3/uL 130-400                   

 

             MPV (test code = MPV) 11.4 fL      9.4-12.4                  

 

             NEUTROP # (test code = NE#) 4.2 10\S\3/uL 1.6-8.0                  

 

 

             LYMPH # (test code = LY#) 2.1 10\S\3/uL 1.1-3.5                   

 

             MONOCYTE # (test code = MO#) 0.5 10\S\3/uL 0.0-1.1                 

  

 

             EOSINOPH # (test code = EO#) 0.1 10\S\3/uL 0.0-0.7                 

  

 

             BASOPHIL # (test code = BA#) 0.0 10\S\3/uL 0.0-0.3                 

  

 

             IG # (test code = IG#) 0.04 10\S\3/uL 0.00-0.06                 

 

             NRBC # (test code = NRBC#) 0.00 10\S\3/uL 0.00-0.01                

 

 

             NEUTROPH % (test code = NE%) 59.7 %       35.0-73.0                

 

 

             LYMPH % (test code = LY%) 30.1 %       20.0-55.0                 

 

             MONO % (test code = MO%) 7.0 %        2.5-10.0                  

 

             EOSINOPH % (test code = EO%) 2.0 %        0.0-5.0                  

 

 

             BASOPHIL % (test code = BA%) 0.6 %        0.0-2.0                  

 

 

             IG % (test code = IG%) 0.6 %        0.0-0.8                   

 

             NRBC% (test code = NRBC%) 0.0 %        0.0-0.2                   

 

             MANDIFF (test code = MDIFF) NO           NO                        

 

             RBC MORPH (test code = RBCMOR)              NORMAL                 

   



COMPREHENSIVE METABOLIC PAN2019-08-15 04:58:00





             Test Item    Value        Reference Range Interpretation Comments

 

             GLUCOSE (test code = 06D) 112 mg/dL           H            

 

             SODIUM (test code = 01A) 143 mmol/L   136-145                   

 

             POTASSIUM (test code = 01B) 4.4 mmol/L   3.6-5.1                   

 

             CHLORIDE (test code = 04A) 108 mmol/L          H            

 

             CO2 (test code = 02A) 27 mmol/L    22-32                     

 

             ANION GAP (test code = ANG) 12.4 mmol/L                            

 

             BUN (test code = 05D) 19 mg/dL     7-18         H            

 

             CREATININE (test code = 03E) 1.4 mg/dL    0.4-1.1      H           

 

 

             BUN/CREA (test code = BCR) 14           12-20                     

 

             CALCIUM (test code = 09D) 8.5 mg/dL    8.3-9.5                   

 

             BILI TOTAL (test code = 11A) 0.9 mg/dL    0.2-1.0                  

 

 

             PROTEIN (test code = 07D) 6.2 g/dL     6.4-8.2      L            

 

             ALBUMIN (test code = 08D) 2.9 g/dL     3.5-4.8      L            

 

             GLOBULIN (test code = GLB) 3.3 g/dL     1.5-3.8                   

 

             ALB/GLOB (test code = AGRR) 0.9          1.0-2.6      L            

 

             ALK PHOS (test code = 35A) 95 IU/L                          

 

             AST (test code = 30A) 19 IU/L      <=42                      

 

             ALT (test code = 31A) 40 IU/L      <=78                      



CBC (INCLUDES AUTOMATED DIFFERENTIAL)2019-08-15 04:51:00





             Test Item    Value        Reference Range Interpretation Comments

 

             WBC (test code = WBC) 8.2 10\S\3/uL 4.5-11.0                  

 

             RBC (test code = RBC) 3.48 10\S\6/uL 4.20-5.60    L            

 

             HGB (test code = HBG) 10.3 g/dL    12.0-15.5    L            

 

             HCT (test code = HCT) 32.4 %       35.0-44.0    L            

 

             MCV (test code = MCV) 93.1 fL      81.0-99.0                 

 

             MCH (test code = MCH) 29.6 pg      27.0-31.0                 

 

             MCHC (test code = MCHC) 31.8 g/dL    32.0-36.0    L            

 

             RDW (test code = RDW) 15.5 %       11.5-14.5    H            

 

             PLT (test code = PLT) 163 10\S\3/uL 130-400                   

 

             MPV (test code = MPV) 11.8 fL      9.4-12.4                  

 

             NEUTROP # (test code = NE#) 5.3 10\S\3/uL 1.6-8.0                  

 

 

             LYMPH # (test code = LY#) 2.0 10\S\3/uL 1.1-3.5                   

 

             MONOCYTE # (test code = MO#) 0.6 10\S\3/uL 0.0-1.1                 

  

 

             EOSINOPH # (test code = EO#) 0.2 10\S\3/uL 0.0-0.7                 

  

 

             BASOPHIL # (test code = BA#) 0.0 10\S\3/uL 0.0-0.3                 

  

 

             IG # (test code = IG#) 0.03 10\S\3/uL 0.00-0.06                 

 

             NRBC # (test code = NRBC#) 0.00 10\S\3/uL 0.00-0.01                

 

 

             NEUTROPH % (test code = NE%) 65.5 %       35.0-73.0                

 

 

             LYMPH % (test code = LY%) 24.2 %       20.0-55.0                 

 

             MONO % (test code = MO%) 7.2 %        2.5-10.0                  

 

             EOSINOPH % (test code = EO%) 2.2 %        0.0-5.0                  

 

 

             BASOPHIL % (test code = BA%) 0.5 %        0.0-2.0                  

 

 

             IG % (test code = IG%) 0.4 %        0.0-0.8                   

 

             NRBC% (test code = NRBC%) 0.0 %        0.0-0.2                   

 

             MANDIFF (test code = MDIFF) NO           NO                        



CARDIAC [id number] 23:10:00





             Test Item    Value        Reference Range Interpretation Comments

 

             TROPONIN I (test code = A84) <0.015 ng/mL 0.000-0.045              

 



U/S VENOUS DOPPLER IVON LOW [id number] 22:23:14LOCATION: [id number]: 
63-year-old female who presents with bilateral leg swelling.COMMENT: Sonographic
imaging of the venous anatomy in both of this patient's legs wasobtained 
utilizing grayscale, color-flow, and Doppler waveform imagingmodalities.The 
common femoral veins, superficial femoral veins, popliteal veins, 
posteriortibial veins, anterior tibial veins, and peroneal veins in both legs 
wereincluded in the study.The anatomy exhibits normal compressibility on 
grayscale study. No suspiciousfilling defects are seen on the color flow study. 
Appropriate waveform responseas are noted on the Dopplerstudy during 
augmentation maneuvers and duringquiet respiration. IMPRESSION:There is no 
sonographic evidence of venous thrombosis in either of thispatient's legs.
CARDIAC [id number] 16:50:00





             Test Item    Value        Reference Range Interpretation Comments

 

             TROPONIN I (test code = A84) <0.015 ng/mL 0.000-0.045              

 



BRAIN NATRIURETIC [id number] 14:39:00





             Test Item    Value        Reference Range Interpretation Comments

 

             proBNP (test code = PBNP) 1337 pg/mL   0-125        H            



THYROID PANEL/SCREEN (TSH)2019 12:05:00





             Test Item    Value        Reference Range Interpretation Comments

 

             TSH (test code = A57) 0.989 uIU/mL 0.358-3.740               



[id number] 12:02:00





             Test Item    Value        Reference Range Interpretation Comments

 

             CPK (test code = 32A) 98 IU/L                          



AMYLASE AND [id number] 12:02:00





             Test Item    Value        Reference Range Interpretation Comments

 

             AMYLASE (test code = 10A) 19 U/L              L            

 

             LIPASE (test code = 60A) 67 IU/L             L            



COMPREHENSIVE METABOLIC [id number] 12:02:00





             Test Item    Value        Reference Range Interpretation Comments

 

             GLUCOSE (test code = 06D) 109 mg/dL           H            

 

             SODIUM (test code = 01A) 142 mmol/L   136-145                   

 

             POTASSIUM (test code = 01B) 4.2 mmol/L   3.6-5.1                   

 

             CHLORIDE (test code = 04A) 109 mmol/L          H            

 

             CO2 (test code = 02A) 26 mmol/L    22-32                     

 

             ANION GAP (test code = ANG) 11.2 mmol/L                            

 

             BUN (test code = 05D) 16 mg/dL     7-18                      

 

             CREATININE (test code = 03E) 1.3 mg/dL    0.4-1.1      H           

 

 

             BUN/CREA (test code = BCR) 13           12-20                     

 

             CALCIUM (test code = 09D) 8.7 mg/dL    8.3-9.5                   

 

             BILI TOTAL (test code = 11A) 1.3 mg/dL    0.2-1.0      H           

 

 

             PROTEIN (test code = 07D) 6.5 g/dL     6.4-8.2                   

 

             ALBUMIN (test code = 08D) 3.2 g/dL     3.5-4.8      L            

 

             GLOBULIN (test code = GLB) 3.3 g/dL     1.5-3.8                   

 

             ALB/GLOB (test code = AGRR) 1.0          1.0-2.6                   

 

             ALK PHOS (test code = 35A) 101 IU/L                         

 

             AST (test code = 30A) 21 IU/L      <=42                      

 

             ALT (test code = 31A) 41 IU/L      <=78                      



TROPONIN -[id number] 11:57:00





             Test Item    Value        Reference Range Interpretation Comments

 

             TROPONIN I (test code = A84) <0.015 ng/mL 0.000-0.045              

 



XR CHEST 1 VIEW [id number] 11:55:22EXAM: XR CHEST 1 VIEW 
PORTABLE.LOCATION: .HISTORY: [id number]: Chest pain.COMPARISON: Radiograph dated
2019.TECHNIQUE: Single AP view of the chest was obtained. FINDINGS:The 
heart is enlarged in size. Small left pleural effusion and left basilaropacities
are present. There is elevation of the right hemidiaphragm. No acuteosseous 
abnormality is identified.IMPRESSION:Small left pleural effusion with left 
basilar opacities, which may representatelectasis or infiltrates.Cardiomegaly.
PRO TIME AND [id number] 11:43:00





             Test Item    Value        Reference Range Interpretation Comments

 

             PT (test code = 13.4 s       9.8-13.6                  



             TT)                                                 

 

             INR (test code = 1.2                                    



             INR)                                                

 

             INRH (test code =  **** SUGGESTED THERAPEUTIC                      

     



             INRH)        RANGE FOR INR: **** 2.5 -                           



                          3.5 ** For Patients with                           



                          Prosthetic Valves or                           



                          Patients with recurrent                           



                          Thromboembolic Events **                           



                          2.0 - 3.0 ** For Most Other                           



                          Applications **                           

 

             PTT (test code = 30.4 s       20.2-38.0                 



             PTT)                                                

 

             PTTH (test code = **** To monitor the                           



             PTTH)        effectiveness of heparin,                           



                          we offer the Anti-Xa                           



                          (Heparin Assay). It can be                           



                          used for either                           



                          unfractionated or LMW                           



                          Heparin. Order Code is                           



                          ANTI-XA ****                           



CBC (INCLUDES AUTOMATED DIFFERENTIAL)2019 11:36:00





             Test Item    Value        Reference Range Interpretation Comments

 

             WBC (test code = WBC) 7.6 10\S\3/uL 4.5-11.0                  

 

             RBC (test code = RBC) 3.53 10\S\6/uL 4.20-5.60    L            

 

             HGB (test code = HBG) 10.3 g/dL    12.0-15.5    L            

 

             HCT (test code = HCT) 33.5 %       35.0-44.0    L            

 

             MCV (test code = MCV) 94.9 fL      81.0-99.0                 

 

             MCH (test code = MCH) 29.2 pg      27.0-31.0                 

 

             MCHC (test code = MCHC) 30.7 g/dL    32.0-36.0    L            

 

             RDW (test code = RDW) 15.4 %       11.5-14.5    H            

 

             PLT (test code = PLT) 164 10\S\3/uL 130-400                   

 

             MPV (test code = MPV) 11.7 fL      9.4-12.4                  

 

             NEUTROP # (test code = NE#) 5.6 10\S\3/uL 1.6-8.0                  

 

 

             LYMPH # (test code = LY#) 1.4 10\S\3/uL 1.1-3.5                   

 

             MONOCYTE # (test code = MO#) 0.4 10\S\3/uL 0.0-1.1                 

  

 

             EOSINOPH # (test code = EO#) 0.1 10\S\3/uL 0.0-0.7                 

  

 

             BASOPHIL # (test code = BA#) 0.0 10\S\3/uL 0.0-0.3                 

  

 

             IG # (test code = IG#) 0.04 10\S\3/uL 0.00-0.06                 

 

             NRBC # (test code = NRBC#) 0.00 10\S\3/uL 0.00-0.01                

 

 

             NEUTROPH % (test code = NE%) 73.9 %       35.0-73.0    H           

 

 

             LYMPH % (test code = LY%) 18.0 %       20.0-55.0    L            

 

             MONO % (test code = MO%) 5.7 %        2.5-10.0                  

 

             EOSINOPH % (test code = EO%) 1.6 %        0.0-5.0                  

 

 

             BASOPHIL % (test code = BA%) 0.3 %        0.0-2.0                  

 

 

             IG % (test code = IG%) 0.5 %        0.0-0.8                   

 

             NRBC% (test code = NRBC%) 0.0 %        0.0-0.2                   

 

             MANDIFF (test code = MDIFF) NO           NO                        

 

             RBC MORPH (test code = RBCMOR)              NORMAL                 

   



CBC WITH [id number] 15:29:00





             Test Item    Value        Reference Range Interpretation Comments

 

             WBC (test code = WBC) 9.7 10\S\3/uL 4.5-11.0                  

 

             RBC (test code = RBC) 3.73 10\S\6/uL 4.20-5.60    L            

 

             HGB (test code = HBG) 11.0 g/dL    12.0-15.5    L            

 

             HCT (test code = HCT) 34.1 %       35.0-44.0    L            

 

             MCV (test code = MCV) 91.4 fL      81.0-99.0                 

 

             MCH (test code = MCH) 29.5 pg      27.0-31.0                 

 

             MCHC (test code = MCHC) 32.3 g/dL    32.0-36.0                 

 

             RDW (test code = RDW) 14.2 %       11.5-14.5                 

 

             PLT (test code = PLT) 116 10\S\3/uL 130-400      L            

 

             MPV (test code = MPV) 11.8 fL      9.4-12.4                  

 

             NEUTROP # (test code = NE#) 7.9 10\S\3/uL 1.6-8.0                  

 

 

             LYMPH # (test code = LY#) 1.0 10\S\3/uL 1.1-3.5      L            

 

             MONOCYTE # (test code = 0.6 10\S\3/uL 0.0-1.1                   



             MO#)                                                

 

             EOSINOPH # (test code = 0.1 10\S\3/uL 0.0-0.7                   



             EO#)                                                

 

             BASOPHIL # (test code = 0.0 10\S\3/uL 0.0-0.3                   



             BA#)                                                

 

             IG # (test code = IG#) 0.04 10\S\3/uL 0.00-0.06                 

 

             NRBC # (test code = NRBC#) 0.00 10\S\3/uL 0.00-0.01                

 

 

             NEUTROPH % (test code = 81.6 %       35.0-73.0    H            



             NE%)                                                

 

             LYMPH % (test code = LY%) 10.3 %       20.0-55.0    L            

 

             MONO % (test code = MO%) 6.4 %        2.5-10.0                  

 

             EOSINOPH % (test code = 1.0 %        0.0-5.0                   



             EO%)                                                

 

             BASOPHIL % (test code = 0.3 %        0.0-2.0                   



             BA%)                                                

 

             IG % (test code = IG%) 0.4 %        0.0-0.8                   

 

             NRBC% (test code = NRBC%) 0.0 %        0.0-0.2                   

 

             PLT EST (test code = ADEQUATE     ADEQUATE                  



             PLTEST)                                             

 

             PLT MORPH (test code = NORMAL (1.5-3 um) NORMAL                    



             PLTMOR)                                             



BASIC METABOLIC [id number] 15:26:00





             Test Item    Value        Reference Range Interpretation Comments

 

             GLUCOSE (test code = 06D) 118 mg/dL           H            

 

             SODIUM (test code = 01A) 136 mmol/L   136-145                   

 

             POTASSIUM (test code = 01B) 4.2 mmol/L   3.6-5.1                   

 

             CHLORIDE (test code = 04A) 106 mmol/L                       

 

             CO2 (test code = 02A) 24 mmol/L    22-32                     

 

             ANION GAP (test code = ANG) 10.2 mmol/L                            

 

             BUN (test code = 05D) 38 mg/dL     7-18         H            

 

             CREATININE (test code = 03E) 1.6 mg/dL    0.4-1.1      H           

 

 

             BUN/CREA (test code = BCR) 23           12-20        H            

 

             CALCIUM (test code = 09D) 9.2 mg/dL    8.3-9.5                   



CARDIAC [id number] 06:04:00





             Test Item    Value        Reference Range Interpretation Comments

 

             TROPONIN I (test code = A84) <0.015 ng/mL 0.000-0.045              

 



BASIC METABOLIC [id number] 05:52:00





             Test Item    Value        Reference Range Interpretation Comments

 

             GLUCOSE (test code = 06D) 171 mg/dL           H            

 

             SODIUM (test code = 01A) 138 mmol/L   136-145                   

 

             POTASSIUM (test code = 01B) 4.0 mmol/L   3.6-5.1                   

 

             CHLORIDE (test code = 04A) 107 mmol/L                       

 

             CO2 (test code = 02A) 23 mmol/L    22-32                     

 

             ANION GAP (test code = ANG) 12.0 mmol/L                            

 

             BUN (test code = 05D) 19 mg/dL     7-18         H            

 

             CREATININE (test code = 03E) 1.2 mg/dL    0.4-1.1      H           

 

 

             BUN/CREA (test code = BCR) 15           12-20                     

 

             CALCIUM (test code = 09D) 8.5 mg/dL    8.3-9.5                   



CBC (INCLUDES AUTOMATED DIFFERENTIAL)2019 05:30:00





             Test Item    Value        Reference Range Interpretation Comments

 

             WBC (test code = WBC) 14.0 10\S\3/uL 4.5-11.0     H            

 

             RBC (test code = RBC) 3.64 10\S\6/uL 4.20-5.60    L            

 

             HGB (test code = HBG) 10.8 g/dL    12.0-15.5    L            

 

             HCT (test code = HCT) 33.2 %       35.0-44.0    L            

 

             MCV (test code = MCV) 91.2 fL      81.0-99.0                 

 

             MCH (test code = MCH) 29.7 pg      27.0-31.0                 

 

             MCHC (test code = MCHC) 32.5 g/dL    32.0-36.0                 

 

             RDW (test code = RDW) 14.0 %       11.5-14.5                 

 

             PLT (test code = PLT) 143 10\S\3/uL 130-400                   

 

             MPV (test code = MPV) 11.4 fL      9.4-12.4                  

 

             NEUTROP # (test code = NE#) 12.1 10\S\3/uL 1.6-8.0      H          

  

 

             LYMPH # (test code = LY#) 0.9 10\S\3/uL 1.1-3.5      L            

 

             MONOCYTE # (test code = MO#) 0.9 10\S\3/uL 0.0-1.1                 

  

 

             EOSINOPH # (test code = EO#) 0.0 10\S\3/uL 0.0-0.7                 

  

 

             BASOPHIL # (test code = BA#) 0.0 10\S\3/uL 0.0-0.3                 

  

 

             IG # (test code = IG#) 0.10 10\S\3/uL 0.00-0.06    H            

 

             NRBC # (test code = NRBC#) 0.00 10\S\3/uL 0.00-0.01                

 

 

             NEUTROPH % (test code = NE%) 86.2 %       35.0-73.0    H           

 

 

             LYMPH % (test code = LY%) 6.3 %        20.0-55.0    L            

 

             MONO % (test code = MO%) 6.6 %        2.5-10.0                  

 

             EOSINOPH % (test code = EO%) 0.0 %        0.0-5.0                  

 

 

             BASOPHIL % (test code = BA%) 0.2 %        0.0-2.0                  

 

 

             IG % (test code = IG%) 0.7 %        0.0-0.8                   

 

             NRBC% (test code = NRBC%) 0.0 %        0.0-0.2                   

 

             MANDIFF (test code = MDIFF) NO           NO                        

 

             RBC MORPH (test code = RBCMOR)              NORMAL                 

   



CARDIAC [id number] 23:08:00





             Test Item    Value        Reference Range Interpretation Comments

 

             TROPONIN I (test code = A84) <0.015 ng/mL 0.000-0.045              

 



CT DISSECTION [id number] 19:26:35Exam: CT thorax PE protocol.Location: 
12History: [id number]: Chest painTechnique: Enhanced spiral slices were taken from
the apices of the lungs,through the upper abdomen utilizing a pulmonary embolism
protocol. Multiplanarreformations were performed. 100cc of Omnipaque were used. 
One or more of thefollowing radiation dose reduction techniques was used: 
automated exposurecontrol, adjustment of mA and/or KV according to patient size,
and/orutilization of iterative reconstruction technique.Findings:Nofilling 
defects are seen in the main pulmonary arteries. The pulmonaryvasculature is 
normal. No pulmonary venous congestion or arterial hypertensionis seen.The lungs
are clear. No infiltration or effusion is seen. No mass or nodule isseen.The 
mediastinum and the pulmonary kandis are normal. Calcified granulomas arenoted. No
lymphadenopathy is present. The heart size is normal.No pericardialeffusion is s
een.The visualized upper abdominal organs are unremarkable.Impression:1. 
Negative for pulmonary embolism.2. No acute disease.THYROID PANEL/SCREEN (TSH)
2019 18:29:00





             Test Item    Value        Reference Range Interpretation Comments

 

             TSH (test code = A57) 0.706 uIU/mL 0.358-3.740               



LIVER [id number] 18:28:00





             Test Item    Value        Reference Range Interpretation Comments

 

             BILI TOTAL (test code = 11A) 0.9 mg/dL    0.2-1.0                  

 

 

             BILI DIRCT (test code = 12A) 0.2 mg/dL    0.0-0.2                  

 

 

             BILI INDIR (test code = BILII) 0.7 mg/dL    <=0.8                  

   

 

             PROTEIN (test code = 07D) 7.7 g/dL     6.4-8.2                   

 

             ALBUMIN (test code = 08D) 3.8 g/dL     3.5-4.8                   

 

             GLOBULIN (test code = GLB) 3.9 g/dL     1.5-3.8      H            

 

             ALB/GLOB (test code = AGRR) 1.0          1.0-2.6                   

 

             ALK PHOS (test code = 35A) 106 IU/L                         

 

             AST (test code = 30A) 25 IU/L      <=42                      

 

             ALT (test code = 31A) 21 IU/L      <=78                      



BRAIN NATRIURETIC [id number] 18:19:00





             Test Item    Value        Reference Range Interpretation Comments

 

             proBNP (test code = PBNP) 518 pg/mL    0-125        H            



[id number] 18:15:00





             Test Item    Value        Reference Range Interpretation Comments

 

             MAGNESIUM (test code = 48A) 1.9 mg/dL    1.8-2.4                   



[id number] 17:40:00





             Test Item    Value        Reference Range Interpretation Comments

 

             D-DIMER (test code = <200 ng/mL D-DU 0-234                     



             DDI)                                                

 

             D-DIMER COMMENT (test *Level to rule out                           



             code = DDCOM) DVT or PE: <235 ng/mL                           



                          D-DU*                                  



CARDIAC [id number] 17:31:00





             Test Item    Value        Reference Range Interpretation Comments

 

             TROPONIN I (test code = A84) <0.015 ng/mL 0.000-0.045              

 



BASIC METABOLIC [id number] 17:27:00





             Test Item    Value        Reference Range Interpretation Comments

 

             GLUCOSE (test code = 06D) 114 mg/dL           H            

 

             SODIUM (test code = 01A) 142 mmol/L   136-145                   

 

             POTASSIUM (test code = 01B) 4.0 mmol/L   3.6-5.1                   

 

             CHLORIDE (test code = 04A) 111 mmol/L          H            

 

             CO2 (test code = 02A) 26 mmol/L    22-32                     

 

             ANION GAP (test code = ANG) 9.0 mmol/L                             

 

             BUN (test code = 05D) 19 mg/dL     7-18         H            

 

             CREATININE (test code = 03E) 1.2 mg/dL    0.4-1.1      H           

 

 

             BUN/CREA (test code = BCR) 15           12-20                     

 

             CALCIUM (test code = 09D) 9.2 mg/dL    8.3-9.5                   



CBC (INCLUDES AUTOMATED DIFFERENTIAL)2019 17:26:00





             Test Item    Value        Reference Range Interpretation Comments

 

             WBC (test code = WBC) 12.6 10\S\3/uL 4.5-11.0     H            

 

             RBC (test code = RBC) 4.04 10\S\6/uL 4.20-5.60    L            

 

             HGB (test code = HBG) 11.9 g/dL    12.0-15.5    L            

 

             HCT (test code = HCT) 37.6 %       35.0-44.0                 

 

             MCV (test code = MCV) 93.1 fL      81.0-99.0                 

 

             MCH (test code = MCH) 29.5 pg      27.0-31.0                 

 

             MCHC (test code = MCHC) 31.6 g/dL    32.0-36.0    L            

 

             RDW (test code = RDW) 13.8 %       11.5-14.5                 

 

             PLT (test code = PLT) 152 10\S\3/uL 130-400                   

 

             MPV (test code = MPV) 11.3 fL      9.4-12.4                  

 

             NEUTROP # (test code = NE#) 10.7 10\S\3/uL 1.6-8.0      H          

  

 

             LYMPH # (test code = LY#) 1.1 10\S\3/uL 1.1-3.5                   

 

             MONOCYTE # (test code = MO#) 0.6 10\S\3/uL 0.0-1.1                 

  

 

             EOSINOPH # (test code = EO#) 0.1 10\S\3/uL 0.0-0.7                 

  

 

             BASOPHIL # (test code = BA#) 0.0 10\S\3/uL 0.0-0.3                 

  

 

             IG # (test code = IG#) 0.06 10\S\3/uL 0.00-0.06                 

 

             NRBC # (test code = NRBC#) 0.00 10\S\3/uL 0.00-0.01                

 

 

             NEUTROPH % (test code = NE%) 85.2 %       35.0-73.0    H           

 

 

             LYMPH % (test code = LY%) 8.4 %        20.0-55.0    L            

 

             MONO % (test code = MO%) 5.0 %        2.5-10.0                  

 

             EOSINOPH % (test code = EO%) 0.6 %        0.0-5.0                  

 

 

             BASOPHIL % (test code = BA%) 0.3 %        0.0-2.0                  

 

 

             IG % (test code = IG%) 0.5 %        0.0-0.8                   

 

             NRBC% (test code = NRBC%) 0.0 %        0.0-0.2                   

 

             MANDIFF (test code = MDIFF) NO           NO                        



PTT (PARTIAL THROMBOPLASTIN TIME)2019 17:26:00





             Test Item    Value        Reference Range Interpretation Comments

 

             PTT (test code = 31.9 s       20.2-38.0                 



             PTT)                                                

 

             PTTH (test code = **** To monitor the                           



             PTTH)        effectiveness of heparin,                           



                          we offer the Anti-Xa                           



                          (Heparin Assay). It can be                           



                          used for either                           



                          unfractionated or LMW                           



                          Heparin. Order Code is                           



                          ANTI-XA ****                           



PROTHROMBIN [id number] 17:26:00





             Test Item    Value        Reference Range Interpretation Comments

 

             PT (test code = 12.0 s       9.8-13.6                  



             TT)                                                 

 

             INR (test code = 1.1                                    



             INR)                                                

 

             INRH (test code =  **** SUGGESTED THERAPEUTIC                      

     



             INRH)        RANGE FOR INR: **** 2.5 -                           



                          3.5 ** For Patients with                           



                          Prosthetic Valves or                           



                          Patients with recurrent                           



                          Thromboembolic Events **                           



                          2.0 - 3.0 ** For Most Other                           



                          Applications **                           



XR CHEST 1 VIEW [id number] 17:04:28Portable AP chest, 1 viewLocation 
Code: X5RDPSORAA HISTORY: Chest painCOMPARISON: NoneCOMMENT: Mild atelectasis is
present within the lung bases. The costophrenic angles aresharp. The 
cardiomediastinalsilhouette is unremarkable. The bones are intact.IMPRESSION: 
Mild bibasilar atelectasis. Otherwise, no acute abnormalityAtrium Health Steele [id number] 
16:51:00





             Test Item    Value        Reference Range Interpretation Comments

 

             Creatinine Lvl (test code = Creatinine 1.15         0.50-1.40      

           



             Lvl)                                                



[facility]2017-01-24 16:51:00





             Test Item    Value        Reference Range Interpretation Comments

 

             BUN (test code = BUN) 16           -                      



[facility]2017-01-24 16:51:00





             Test Item    Value        Reference Range Interpretation Comments

 

             Chloride Lvl (test code = Chloride Lvl) 109                  

            



[facility]2017-01-24 16:51:00





             Test Item    Value        Reference Range Interpretation Comments

 

             Potassium Lvl (test code = Potassium 4.3          3.5-5.1          

         



             Lvl)                                                



[facility]2017-01-24 16:51:00





             Test Item    Value        Reference Range Interpretation Comments

 

             Sodium Lvl (test code = Sodium Lvl) 144          135-145           

        



[facility]2017-01-24 16:51:00





             Test Item    Value        Reference Range Interpretation Comments

 

             CO2 (test code = CO2) 28           24-32                     



[facility]2017-01-24 16:51:00





             Test Item    Value        Reference Range Interpretation Comments

 

             Calcium Lvl (test code = Calcium Lvl) 8.4          8.5-10.5        

          



[facility]2017-01-24 16:51:00





             Test Item    Value        Reference Range Interpretation Comments

 

             AGAP (test code = AGAP) 11.3         10.0-20.0                 



[facility]2017-01-24 16:51:00





             Test Item    Value        Reference Range Interpretation Comments

 

             Glucose Lvl (test code = Glucose Lvl) 109          70-99           

          



[facility]2017-01-24 16:51:00





             Test Item    Value        Reference Range Interpretation Comments

 

             eGFR (test code = eGFR) 52                                     



[facility]2017-01-24 16:51:00





             Test Item    Value        Reference Range Interpretation Comments

 

             Creatinine Lvl (test code = Creatinine 1.15         0.50-1.40      

           



             Lvl)                                                



[facility]2017-01-24 16:51:00





             Test Item    Value        Reference Range Interpretation Comments

 

             BUN (test code = BUN) 16                                 



[facility]2017-01-24 16:51:00





             Test Item    Value        Reference Range Interpretation Comments

 

             Chloride Lvl (test code = Chloride Lvl) 109                  

            



[facility]2017-01-24 16:51:00





             Test Item    Value        Reference Range Interpretation Comments

 

             Potassium Lvl (test code = Potassium 4.3          3.5-5.1          

         



             Lvl)                                                



[facility]2017-01-24 16:51:00





             Test Item    Value        Reference Range Interpretation Comments

 

             Sodium Lvl (test code = Sodium Lvl) 144          135-145           

        



[facility]2017-01-24 16:51:00





             Test Item    Value        Reference Range Interpretation Comments

 

             CO2 (test code = CO2) 28           -                     



[facility]2017-01-24 16:51:00





             Test Item    Value        Reference Range Interpretation Comments

 

             Calcium Lvl (test code = Calcium Lvl) 8.4          8.5-10.5        

          



[facility]2017-01-24 16:51:00





             Test Item    Value        Reference Range Interpretation Comments

 

             AGAP (test code = AGAP) 11.3         10.0-20.0                 



[facility]2017-01-24 16:51:00





             Test Item    Value        Reference Range Interpretation Comments

 

             Glucose Lvl (test code = Glucose Lvl) 109          70-99           

          



[facility]2017-01-24 16:51:00





             Test Item    Value        Reference Range Interpretation Comments

 

             eGFR (test code = eGFR) 52                                     



[facility]2017-01-24 16:51:00





             Test Item    Value        Reference Range Interpretation Comments

 

             Creatinine Lvl (test code = Creatinine 1.15         0.50-1.40      

           



             Lvl)                                                



[facility]2017-01-24 16:51:00





             Test Item    Value        Reference Range Interpretation Comments

 

             BUN (test code = BUN) 16                                 



[facility]2017-01-24 16:51:00





             Test Item    Value        Reference Range Interpretation Comments

 

             Chloride Lvl (test code = Chloride Lvl) 109                  

            



[facility]2017-01-24 16:51:00





             Test Item    Value        Reference Range Interpretation Comments

 

             Potassium Lvl (test code = Potassium 4.3          3.5-5.1          

         



             Lvl)                                                



[facility]2017-01-24 16:51:00





             Test Item    Value        Reference Range Interpretation Comments

 

             Sodium Lvl (test code = Sodium Lvl) 144          135-145           

        



[facility]2017-01-24 16:51:00





             Test Item    Value        Reference Range Interpretation Comments

 

             CO2 (test code = CO2) 28           24-32                     



[facility]2017-01-24 16:51:00





             Test Item    Value        Reference Range Interpretation Comments

 

             Calcium Lvl (test code = Calcium Lvl) 8.4          8.5-10.5        

          



[facility]2017-01-24 16:51:00





             Test Item    Value        Reference Range Interpretation Comments

 

             AGAP (test code = AGAP) 11.3         10.0-20.0                 



[facility]2017-01-24 16:51:00





             Test Item    Value        Reference Range Interpretation Comments

 

             Glucose Lvl (test code = Glucose Lvl) 109          70-99           

          



[facility]2017-01-24 16:51:00





             Test Item    Value        Reference Range Interpretation Comments

 

             eGFR (test code = eGFR) 52                                     



[facility]

## 2022-11-11 NOTE — RAD REPORT
EXAM DESCRIPTION:  RAD - Chest Single View - 11/11/2022 12:30 pm

 

CLINICAL HISTORY:  CHEST PAIN

Chest pain.

 

COMPARISON:  Chest Single View dated 10/13/2022; Chest Single View dated 7/31/2022; Chest Single View
 dated 7/29/2022; Chest Single View dated 3/13/2022

 

FINDINGS:  Portable technique limits examination quality.

 

The lungs are underinflated but grossly clear. Chronically elevated right hemidiaphragm noted. Heart 
is moderately enlarged. Left-sided venous catheter has tip in the SVC.

 

IMPRESSION:  Underinflated lungs.

## 2022-11-12 PROCEDURE — 5A1D70Z PERFORMANCE OF URINARY FILTRATION, INTERMITTENT, LESS THAN 6 HOURS PER DAY: ICD-10-PCS

## 2022-11-12 RX ADMIN — APIXABAN SCH MG: 2.5 TABLET, FILM COATED ORAL at 21:54

## 2022-11-12 RX ADMIN — PANTOPRAZOLE SODIUM SCH MG: 40 TABLET, DELAYED RELEASE ORAL at 08:11

## 2022-11-12 RX ADMIN — DIGOXIN SCH MG: 125 TABLET ORAL at 16:18

## 2022-11-12 RX ADMIN — Medication SCH: at 21:00

## 2022-11-12 RX ADMIN — HEPARIN SODIUM PRN UNIT: 1000 INJECTION, SOLUTION INTRAVENOUS; SUBCUTANEOUS at 15:34

## 2022-11-12 RX ADMIN — PROMETHAZINE HYDROCHLORIDE PRN MG: 25 INJECTION INTRAMUSCULAR; INTRAVENOUS at 08:11

## 2022-11-12 RX ADMIN — PROMETHAZINE HYDROCHLORIDE PRN MG: 25 INJECTION INTRAMUSCULAR; INTRAVENOUS at 16:18

## 2022-11-12 RX ADMIN — ATORVASTATIN CALCIUM SCH MG: 10 TABLET, FILM COATED ORAL at 21:53

## 2022-11-12 RX ADMIN — METOPROLOL SUCCINATE SCH MG: 25 TABLET, EXTENDED RELEASE ORAL at 21:54

## 2022-11-12 RX ADMIN — Medication SCH: at 08:12

## 2022-11-12 RX ADMIN — APIXABAN SCH MG: 2.5 TABLET, FILM COATED ORAL at 08:11

## 2022-11-12 RX ADMIN — MORPHINE SULFATE PRN MG: 4 INJECTION, SOLUTION INTRAMUSCULAR; INTRAVENOUS at 08:12

## 2022-11-12 NOTE — PN
Date of Progress Note:  11/12/2022



Subjective:  Seen by bedside.  No chest pain.



Review of Systems:

No further chest pain, but she has shortness of breath.  No orthopnea.  No lower extremity edema.  No
 nausea, vomiting, diarrhea.  No abdominal pain.  No dysuria, polyuria, or urinary urgency.  No skin 
rash.  All other systems reviewed and they were negative.



Physical Examination:

Vital Signs:  Reviewed. 

Head and Neck:  Pupils are equal, reactive to light.  Intact eye movements.  No JVD.  No cervical lym
phadenopathy.  Neck is supple.  Thyroid is not enlarged. 

Lungs:  Decreased breathing sounds with rhonchi bilaterally.  No accessory muscle use or muscle retra
ction. 

Heart:  Regular rate and rhythm.  No extra sounds. 

Abdomen:  Soft, nontender.  Bowel sounds positive.  No organomegaly.  No masses or hernia.  No rigidi
ty or rebound. 

Extremities:  3+ edema bilaterally.  No clubbing or cyanosis.  Intact pulses. 

Skin:  No rash. 

Neurologic:  Alert, awake, oriented x3.  No acute focal deficits appreciated.



Investigations:  Three cardiac enzymes sets were negative.



Assessment And Recommendations:  

1.Chest pain.  She had recent negative stress test and cardiac enzymes are negative.  No further car
diac testing is recommended.  Her symptoms likely due to fluid overload condition.

2.Acute on chronic diastolic heart failure exacerbation with fluid 

retention.  The patient needs more fluid removal with dialysis as planned and recommended by Nephrolo
gy.





/JUAREZ

DD:  11/12/2022 12:47:54Voice ID:  [id number]

DT:  11/12/2022 14:49:02Report ID:  [id number]

## 2022-11-12 NOTE — PN
Date of Progress Note:  11/12/2022



Subjective:  The patient was admitted with over volume, chest tightness.  Cardiac enzyme has been don
e.  Unstable angina has been ruled out.  The patient missed her dialysis yesterday.  Scheduled for di
alysis today.



Physical Examination:

Vital Signs:  When I saw the patient, blood pressure 124/69, pulse of 73. 

Chest:  Crackles bilateral base. 

Heart:  S1, S2.  Regular. 

Abdomen:  Soft, nontender. 

Extremities:  No edema. 

Neuro:  Alert.  No focality.



Laboratory Data:  For the patient; hemoglobin 9.4.  Sodium 137, potassium 4.7, bicarb 31, BUN 25, cre
atinine 5.7, GFR of 8, calcium 9.4.



Current Medications:  The patient on include; Eliquis, promethazine, Epogen, atorvastatin, digoxin, m
etoprolol, pantoprazole.



Assessment And Plan:  

1.End-stage renal disease, over volume.  We will arrange for dialysis today and we will monitor the 
patient.

2.Hypertension, controlled optimal.  We will utilize blood pressure for more ultrafiltration.

3.Over volume.  We will challenge the patient today.

4.Respiratory distress, secondary to over volume as above.





MA/JUAREZ

DD:  11/12/2022 11:04:14Voice ID:  [id number]

DT:  11/12/2022 14:30:06Report ID:  [id number]

## 2022-11-12 NOTE — P.PN
Subjective


Date of Service: 22


Primary Care Provider: Brittni


Chief Complaint: angina, esrd


Subjective: No new changes (patient playing games on her phone.  States she is 

doing well.  Still having chest pain.  Which does not seem to match her 

demeanor)





Review of Systems


10-point ROS is otherwise unremarkable


Cardiovascular: Chest Pain





Physical Examination





- Vital Signs


Temperature: 98.2 F


Blood Pressure: 124/69


Pulse: 73


Respirations: 18


Pulse Ox (%): 93





- Physical Exam


General: Alert, In no apparent distress


HEENT: Atraumatic, PERRLA, EOMI


Neck: Supple, JVD not distended


Respiratory: Clear to auscultation bilaterally, Normal air movement


Cardiovascular: Regular rate/rhythm, Normal S1 S2


Gastrointestinal: Normal bowel sounds, No tenderness


Musculoskeletal: No tenderness


Integumentary: No rashes


Neurological: Normal speech, Normal tone, Normal affect


Lymphatics: No axilla or inguinal lymphadenopathy





- Studies


Laboratory Data (last 24 hrs)





22 12:10: WBC 6.50, Hgb 9.4 L, Hct 29.7 L, Plt Count 103 L


22 12:10: Sodium 137, Potassium 4.7, BUN 25 H, Creatinine 5.77 H*, Glucose

92








Assessment And Plan





- Current Problems (Diagnosis)


(1) CHF (congestive heart failure)


Current Visit: No   Status: Chronic   


Plan: 


1 + edema.  She is doing well.  Her lungs are clear.  Will restart her 

metoprolol.  


Qualifiers: 


   Heart failure type: diastolic   Heart failure chronicity: chronic   Qualified

Code(s): I50.32 - Chronic diastolic (congestive) heart failure   





(2) ESRD (end stage renal disease) on dialysis


Current Visit: No   Status: Chronic   


Plan: 


discussed with Dr. Whelan. She is a TTS dialysis patient 





plans for dialysis today 








(3) Sleep apnea


Current Visit: No   Status: Acute   


Plan: 


will order a cpap for the patient, she can also bring her home cpap 


Qualifiers: 


   Primary sleep apnea of  type: obstructive 





(4) Angina at rest


Current Visit: Yes   Status: Acute   


Plan: 


will admit the patient.  Check serial troponins. She was sent to the ER from a 

office visit with Dr. Barajas.  Will discuss the plan with him 





Patient is comfortable.  troponins are negative.   No plans from cardiology.  

Other than dialysis for diuresis 





Discharge Plan: Home


Plan to discharge in: 24 Hours





- Code Status/Comfort Care


Code Status Assessed: No


Physician Review: Patient Assessed, Agree with Above Assessment and Plan


Critical Care: No


Time Spent Managing PTS Care (In Minutes): 20

## 2022-11-13 LAB
ALBUMIN SERPL BCP-MCNC: 3.2 G/DL (ref 3.4–5)
ALP SERPL-CCNC: 142 U/L (ref 45–117)
ALT SERPL W P-5'-P-CCNC: 16 U/L (ref 12–78)
AST SERPL W P-5'-P-CCNC: 7 U/L (ref 15–37)
BUN BLD-MCNC: 24 MG/DL (ref 7–18)
GLUCOSE SERPLBLD-MCNC: 105 MG/DL (ref 74–106)
HCT VFR BLD CALC: 30.3 % (ref 36–45)
LYMPHOCYTES # SPEC AUTO: 0.8 K/UL (ref 0.7–4.9)
MCV RBC: 101.3 FL (ref 80–100)
PMV BLD: 7.8 FL (ref 7.6–11.3)
POTASSIUM SERPL-SCNC: 4.6 MMOL/L (ref 3.5–5.1)
RBC # BLD: 2.99 M/UL (ref 3.86–4.86)

## 2022-11-13 RX ADMIN — METOPROLOL SUCCINATE SCH MG: 25 TABLET, EXTENDED RELEASE ORAL at 22:12

## 2022-11-13 RX ADMIN — MORPHINE SULFATE PRN MG: 4 INJECTION, SOLUTION INTRAMUSCULAR; INTRAVENOUS at 02:04

## 2022-11-13 RX ADMIN — PROMETHAZINE HYDROCHLORIDE PRN MG: 25 INJECTION INTRAMUSCULAR; INTRAVENOUS at 02:04

## 2022-11-13 RX ADMIN — PROMETHAZINE HYDROCHLORIDE PRN MG: 25 INJECTION INTRAMUSCULAR; INTRAVENOUS at 08:46

## 2022-11-13 RX ADMIN — Medication SCH: at 17:17

## 2022-11-13 RX ADMIN — APIXABAN SCH MG: 2.5 TABLET, FILM COATED ORAL at 22:12

## 2022-11-13 RX ADMIN — PROMETHAZINE HYDROCHLORIDE PRN MG: 25 INJECTION INTRAMUSCULAR; INTRAVENOUS at 17:09

## 2022-11-13 RX ADMIN — APIXABAN SCH MG: 2.5 TABLET, FILM COATED ORAL at 08:38

## 2022-11-13 RX ADMIN — MORPHINE SULFATE PRN MG: 4 INJECTION, SOLUTION INTRAMUSCULAR; INTRAVENOUS at 17:09

## 2022-11-13 RX ADMIN — DIGOXIN SCH MG: 125 TABLET ORAL at 08:40

## 2022-11-13 RX ADMIN — Medication SCH: at 08:38

## 2022-11-13 RX ADMIN — MORPHINE SULFATE PRN MG: 4 INJECTION, SOLUTION INTRAMUSCULAR; INTRAVENOUS at 08:46

## 2022-11-13 RX ADMIN — ATORVASTATIN CALCIUM SCH MG: 10 TABLET, FILM COATED ORAL at 22:12

## 2022-11-13 RX ADMIN — PANTOPRAZOLE SODIUM SCH MG: 40 TABLET, DELAYED RELEASE ORAL at 08:38

## 2022-11-13 NOTE — P.PN
Subjective


Date of Service: 22


Primary Care Provider: Brittni


Chief Complaint: angina, esrd


Subjective: Improving (3.5 lts removed   Patient is comfortable playing on her 

ipad.  still complaints of chest pain.  Seems out of proportion to her vitals, 

labs and physical exam)





Review of Systems


10-point ROS is otherwise unremarkable


Respiratory: Shortness of Breath


Cardiovascular: Chest Pain





Physical Examination





- Vital Signs


Temperature: 98.1 F


Blood Pressure: 121/73


Pulse: 83


Respirations: 18


Pulse Ox (%): 93





- Physical Exam


General: Alert, In no apparent distress


HEENT: Atraumatic, PERRLA, EOMI


Neck: Supple, JVD not distended


Respiratory: Clear to auscultation bilaterally, Normal air movement


Cardiovascular: Regular rate/rhythm, Normal S1 S2


Gastrointestinal: Normal bowel sounds, No tenderness


Musculoskeletal: No tenderness


Integumentary: No rashes


Neurological: Normal speech, Normal tone, Normal affect


Lymphatics: No axilla or inguinal lymphadenopathy





Assessment And Plan





- Current Problems (Diagnosis)


(1) CHF (congestive heart failure)


Current Visit: No   Status: Chronic   


Plan: 


1 + edema.  She is doing well.  Her lungs are clear.  Will restart her 

metoprolol.  





no plans on cardiac testing. this seems to be an exacerbation 


Qualifiers: 


   Heart failure type: diastolic   Heart failure chronicity: chronic   Qualified

Code(s): I50.32 - Chronic diastolic (congestive) heart failure   





(2) ESRD (end stage renal disease) on dialysis


Current Visit: No   Status: Chronic   


Plan: 


discussed with Dr. Whelan. She is a TTS dialysis patient 





plans for dialysis today 








(3) Sleep apnea


Current Visit: No   Status: Acute   


Plan: 


will order a cpap for the patient, she can also bring her home cpap 


Qualifiers: 


   Primary sleep apnea of  type: obstructive 





(4) Angina at rest


Current Visit: Yes   Status: Acute   


Plan: 


will admit the patient.  Check serial troponins. She was sent to the ER from a 

office visit with Dr. Barajas.  Will discuss the plan with him 





resolved.  Most likely due to acute chf exacerbation 





Discharge Plan: Home


Plan to discharge in: 24 Hours





- Code Status/Comfort Care


Code Status Assessed: No


Physician Review: Patient Assessed, Agree with Above Assessment and Plan


Critical Care: No


Time Spent Managing PTS Care (In Minutes): 20

## 2022-11-13 NOTE — PN
Date of Progress Note:  11/13/2022



Subjective:  The patient was admitted to the hospital with over volume.  The patient had dialysis yes
terday.  Still feeling shortness of breath.



Physical Examination:

Vital Signs:  Blood pressure 121/73, pulse of 83, afebrile. 

Chest:  Crackles bilateral. 

Heart:  S1, S2.  Systolic murmur. 

Abdomen:  Soft, nontender. 

Extremity:  No edema.



Laboratory Data:  Hemoglobin 9.8.  Sodium 139, potassium 4.6, bicarb 29, BUN 24, creatinine 5.5, calc
ium 9.2.  Albumin 3.2.



Current Medications:  The patient on include Eliquis, Epogen, digoxin, metoprolol 25 b.i.d.



Assessment And Plan:  

1.End-stage renal disease with over volume.  We will arrange for extra dialysis tomorrow to establis
h better volume control.

2.Hypertension.  We will utilize blood pressure for more ultrafiltration.

3.Anemia of chronic kidney disease.  Continue LENARD.

4.Over volume.  We will do extra dialysis tomorrow.





KHUSHI

DD:  11/13/2022 12:22:30Voice ID:  [id number]

DT:  11/13/2022 13:09:47Report ID:  [id number]

## 2022-11-14 VITALS — DIASTOLIC BLOOD PRESSURE: 67 MMHG | SYSTOLIC BLOOD PRESSURE: 131 MMHG | TEMPERATURE: 99 F

## 2022-11-14 VITALS — OXYGEN SATURATION: 94 %

## 2022-11-14 LAB
ALBUMIN SERPL BCP-MCNC: 2.9 G/DL (ref 3.4–5)
ALP SERPL-CCNC: 146 U/L (ref 45–117)
ALT SERPL W P-5'-P-CCNC: 17 U/L (ref 12–78)
ANISOCYTOSIS BLD QL: (no result)
AST SERPL W P-5'-P-CCNC: 11 U/L (ref 15–37)
BLD SMEAR INTERP: (no result)
BUN BLD-MCNC: 39 MG/DL (ref 7–18)
GIANT PLATELETS BLD QL SMEAR: (no result)
GLUCOSE SERPLBLD-MCNC: 100 MG/DL (ref 74–106)
HCT VFR BLD CALC: 28.7 % (ref 36–45)
LYMPHOCYTES # SPEC AUTO: 0.7 K/UL (ref 0.7–4.9)
MACROCYTES BLD QL: (no result)
MCV RBC: 102 FL (ref 80–100)
MORPHOLOGY BLD-IMP: (no result)
OVALOCYTES BLD QL SMEAR: (no result)
PMV BLD: 8.1 FL (ref 7.6–11.3)
POLYCHROMASIA BLD QL SMEAR: (no result)
POTASSIUM SERPL-SCNC: 5.1 MMOL/L (ref 3.5–5.1)
RBC # BLD: 2.81 M/UL (ref 3.86–4.86)

## 2022-11-14 RX ADMIN — PANTOPRAZOLE SODIUM SCH MG: 40 TABLET, DELAYED RELEASE ORAL at 09:41

## 2022-11-14 RX ADMIN — APIXABAN SCH MG: 2.5 TABLET, FILM COATED ORAL at 09:41

## 2022-11-14 RX ADMIN — DIGOXIN SCH: 125 TABLET ORAL at 09:00

## 2022-11-14 RX ADMIN — Medication SCH: at 09:00

## 2022-11-14 RX ADMIN — MORPHINE SULFATE PRN MG: 4 INJECTION, SOLUTION INTRAMUSCULAR; INTRAVENOUS at 09:41

## 2022-11-14 RX ADMIN — PROMETHAZINE HYDROCHLORIDE PRN MG: 25 INJECTION INTRAMUSCULAR; INTRAVENOUS at 09:41

## 2022-11-14 RX ADMIN — HEPARIN SODIUM PRN UNIT: 1000 INJECTION, SOLUTION INTRAVENOUS; SUBCUTANEOUS at 13:22

## 2022-11-14 NOTE — PN
Date of Progress Note:  11/14/2022



Subjective:  Seen by bedside.  She is in dialysis.  Her breathing is improving.



Physical Examination:

Vital Signs:  Reviewed. 

Head and Neck:  Pupils are equal, reactive to light.  Intact eye movements.  Positive JVD.  No cervic
al lymphadenopathy.  Neck is supple.  Thyroid is not enlarged. 

Lungs:  Clear to auscultation bilaterally.  No rhonchi, wheezing, or crackles.  No accessory muscle u
se. 

Heart:  Regular rate and rhythm.  No extra sounds. 

Abdomen:  Soft, nontender.  Bowel sounds positive.  No organomegaly.  No masses or hernia.  No rigidi
ty or rebound. 

Extremities:  No clubbing or cyanosis.  Intact pulses.  Positive edema. 

Skin:  No rash. 

Neurologic:  Alert, awake, oriented x3.  No acute focal deficits appreciated.



Investigations:  Labs were reviewed.



Assessment And Recommendations:  

1.Acute on chronic diastolic heart failure with fluid retention, probably not compliant with dialysi
s.  She is having in-house dialysis by Nephrology and fluid removal adjustment accordingly.

2.Chest pain.  It is noncardiac.  Troponins are negative and she had normal stress test recently.





SR/MODL

DD:  11/14/2022 14:01:27Voice ID:  [id number]

DT:  11/14/2022 15:57:13Report ID:  [id number]

## 2022-11-14 NOTE — P.DS
Admission Date: 22


Discharge Date: 22


Primary Care Provider: Brittni


Disposition: ROUTINE DISCHARGE


Discharge Condition: GOOD


Reason for Admission: angina, esrd





- Problems


(1) CHF (congestive heart failure)


Current Visit: No   Status: Chronic   


Qualifiers: 


   Heart failure type: diastolic   Heart failure chronicity: chronic   Qualified

Code(s): I50.32 - Chronic diastolic (congestive) heart failure   





(2) ESRD (end stage renal disease) on dialysis


Current Visit: No   Status: Chronic   





(3) Sleep apnea


Current Visit: No   Status: Acute   


Qualifiers: 


   Primary sleep apnea of  type: obstructive 





(4) Angina at rest


Current Visit: Yes   Status: Acute   


Brief History of Present Illness: 





Patient is an office patient of mine.  She has a history of chf, diastolic 

dysfunction, Sleep apnea, afib and htn.  She evidently has been having chest 

pain for the past 10days.  Went to a cardiologist in [facility].  Was admitted 

and had a chest pain rule out.  The patient was discharged.  She was still 

having pain. Describes it as left sided, non radiating.  Currently a 8/10.  She 

states it is constant at this point.  Was on exertion to begin with.  


Hospital Course: 





Patient was admitted.  Had negative troponins.  She had improved edema after 

dialysis.  Was resting comfortably every day.  However complained of chest pain.

There is no plans for cardiac testing.  Was done last week in [facility].  Will 

have her follow up as an outpatient 


Vital Signs/Physical Exam: 














Temp Pulse Resp BP Pulse Ox


 


 98.2 F   86   20   99/53 L  94 


 


 22 04:00  22 04:00  22 04:00  22 04:00  22 04:00








General: Alert, In no apparent distress


HEENT: Atraumatic, PERRLA, EOMI


Neck: Supple, JVD not distended


Respiratory: Clear to auscultation bilaterally, Normal air movement


Cardiovascular: Regular rate/rhythm, Normal S1 S2


Gastrointestinal: Normal bowel sounds, No tenderness


Musculoskeletal: No tenderness


Integumentary: No rashes


Neurological: Normal speech, Normal tone, Normal affect


Lymphatics: No axilla or inguinal lymphadenopathy


Laboratory Data at Discharge: 














WBC  4.70 K/uL (4.3-10.9)   22  06:22    


 


Hgb  9.3 g/dL (12.0-15.0)  L  22  06:22    


 


Hct  28.7 % (36.0-45.0)  L  22  06:22    


 


Plt Count  97 K/uL (152-406)  L  22  06:22    


 


Sodium  141 mmol/L (136-145)   22  06:22    


 


Potassium  5.1 mmol/L (3.5-5.1)   22  06:22    


 


BUN  39 mg/dL (7-18)  H  22  06:22    


 


Creatinine  7.37 mg/dL (0.55-1.3)  H*  22  06:22    


 


Glucose  100 mg/dL ()   22  06:22    


 


Total Bilirubin  1.4 mg/dL (0.2-1.0)  H  22  06:22    


 


AST  11 U/L (15-37)  L  22  06:22    


 


ALT  17 U/L (12-78)   22  06:22    


 


Alkaline Phosphatase  146 U/L ()  H  22  06:22    








Home Medications: 








Apixaban [Eliquis] 2.5 mg PO BID 22 


Bumetanide 1 tab PO DAILY 22 


Digoxin [Lanoxin] 1 tab PO T,TH,S 22 


Metoprolol Tartrate 1 tab PO BID 22 


Midodrine HCl 1 tab PO TID 22 


Pantoprazole [Protonix Tab*] 1 tab PO BID 22 


Sevelamer Carbonate [Renvela] 800 mg PO TID 22 


Dicyclomine [Bentyl*] 1 tab PO TID PRN 22 


Ondansetron [Zofran (Odt)*] 8 mg PO TID PRN 22 


Tizanidine [Zanaflex*] 1 tab PO BID 22 





Diet: Renal


Activity: Ad susan


Followup: 


Farheen Bernal [ACTIVE - CAN ADMIT] - 


Farheen Bernal MD [OUTSIDE PHYSICIAN] - 2-3 Days


Anthony Elkins MD [Primary Care Provider] - 1 Week


Physician Review: Patient Assessed, Agree with Above Assessment and Plan


Time spent managing pt's care (in minutes): 30

## 2022-11-14 NOTE — EKG
Test Date:    2022-11-11               Test Time:    11:11:53

Technician:   ANTONI                                     

                                                     

MEASUREMENT RESULTS:                                       

Intervals:                                           

Rate:         52                                     

AZ:                                                  

QRSD:         86                                     

QT:           418                                    

QTc:          388                                    

Axis:                                                

P:                                                   

AZ:                                                  

QRS:          109                                    

T:            40                                     

                                                     

INTERPRETIVE STATEMENTS:                                       

                                                     

Atrial fibrillation with slow ventricular response

Possible Right ventricular hypertrophy

Nonspecific ST and T wave abnormality

Abnormal ECG

Compared to ECG 10/13/2022 14:44:44

ST (T wave) deviation now present

Incomplete right bundle-branch block no longer present

T-wave abnormality no longer present

Possible ischemia no longer present



Electronically Signed On 11-14-22 15:11:09 CST by Venancio Barajas

## 2022-11-15 NOTE — PN
Date of Progress Note:  11/14/2022



Chief Complaint:  Congestive heart failure associated with severe fatigue, shortness of breath, gener
alized weakness, and hypotensive episode.



Subjective:  The patient was admitted to the hospital because of volume overload.  Cardiology consult
ation is requested.  The patient is dialysis dependent.  She has end-stage renal disease and is under
going dialysis 3 times per week.



Review of Systems:

The patient is feeling better.  Today, she is undergoing dialysis.



Physical Examination:

Vital Signs:  Blood pressure 110/70, heart rate 83. 

Chest:  Few crackles. 

Heart:  S1, S2.  Systolic murmur 2/6 at left lower sternal border. 

Abdomen:  Obese, soft, nontender. 

Extremities:  Slight edema.



Impression And Plan:  

1.End-stage renal disease, fluid overload.  Continue p.o. fluid restriction.  Monitor fluid balance.
  The patient will have dialysis tomorrow.

2.Hypertension.  Currently, the patient is off blood pressure medication due to history of hypotensi
ve episode, although the patient may need to continue beta-blocker after received Cardiology recommen
dation.

3.Anemia of chronic kidney disease.  Continue LENARD, fluid overload.  Continue p.o. fluid restriction,
 low-sodium diet.

4.History of intradialytic hypotension.  The patient will follow up with Cardiology for workup of po
ssible pulmonary hypertension.





EB/MODL

DD:  11/14/2022 23:59:54Voice ID:  [id number]

DT:  11/15/2022 06:18:49Report ID:  [id number]

## 2022-12-03 ENCOUNTER — HOSPITAL ENCOUNTER (EMERGENCY)
Dept: HOSPITAL 97 - ER | Age: 67
Discharge: ANOTHER HOSPITAL | End: 2022-12-03
Payer: COMMERCIAL

## 2022-12-03 VITALS — SYSTOLIC BLOOD PRESSURE: 98 MMHG | DIASTOLIC BLOOD PRESSURE: 57 MMHG | OXYGEN SATURATION: 96 %

## 2022-12-03 VITALS — TEMPERATURE: 97.6 F

## 2022-12-03 DIAGNOSIS — I13.2: ICD-10-CM

## 2022-12-03 DIAGNOSIS — Z88.1: ICD-10-CM

## 2022-12-03 DIAGNOSIS — E78.5: ICD-10-CM

## 2022-12-03 DIAGNOSIS — Z88.0: ICD-10-CM

## 2022-12-03 DIAGNOSIS — Z20.822: ICD-10-CM

## 2022-12-03 DIAGNOSIS — I50.9: ICD-10-CM

## 2022-12-03 DIAGNOSIS — R00.1: ICD-10-CM

## 2022-12-03 DIAGNOSIS — Z99.2: ICD-10-CM

## 2022-12-03 DIAGNOSIS — E87.6: ICD-10-CM

## 2022-12-03 DIAGNOSIS — N18.6: ICD-10-CM

## 2022-12-03 DIAGNOSIS — D64.9: ICD-10-CM

## 2022-12-03 DIAGNOSIS — Z88.5: ICD-10-CM

## 2022-12-03 DIAGNOSIS — I48.19: Primary | ICD-10-CM

## 2022-12-03 LAB
ALBUMIN SERPL BCP-MCNC: 3.2 G/DL (ref 3.4–5)
ALP SERPL-CCNC: 126 U/L (ref 45–117)
ALT SERPL W P-5'-P-CCNC: 16 U/L (ref 12–78)
AST SERPL W P-5'-P-CCNC: 11 U/L (ref 15–37)
BUN BLD-MCNC: 19 MG/DL (ref 7–18)
GLUCOSE SERPLBLD-MCNC: 151 MG/DL (ref 74–106)
HCT VFR BLD CALC: 29.3 % (ref 36–45)
INR BLD: 1.27
LYMPHOCYTES # SPEC AUTO: 0.9 K/UL (ref 0.7–4.9)
MAGNESIUM SERPL-MCNC: 2.1 MG/DL (ref 1.8–2.4)
MCV RBC: 98.9 FL (ref 80–100)
MORPHOLOGY BLD-IMP: (no result)
PMV BLD: 7.9 FL (ref 7.6–11.3)
POTASSIUM SERPL-SCNC: 3.2 MMOL/L (ref 3.5–5.1)
RBC # BLD: 2.96 M/UL (ref 3.86–4.86)
TROPONIN I SERPL HS-MCNC: 27.2 PG/ML (ref ?–58.9)

## 2022-12-03 PROCEDURE — 36415 COLL VENOUS BLD VENIPUNCTURE: CPT

## 2022-12-03 PROCEDURE — 83880 ASSAY OF NATRIURETIC PEPTIDE: CPT

## 2022-12-03 PROCEDURE — 84484 ASSAY OF TROPONIN QUANT: CPT

## 2022-12-03 PROCEDURE — 80076 HEPATIC FUNCTION PANEL: CPT

## 2022-12-03 PROCEDURE — 87811 SARS-COV-2 COVID19 W/OPTIC: CPT

## 2022-12-03 PROCEDURE — 80048 BASIC METABOLIC PNL TOTAL CA: CPT

## 2022-12-03 PROCEDURE — 93005 ELECTROCARDIOGRAM TRACING: CPT

## 2022-12-03 PROCEDURE — 85610 PROTHROMBIN TIME: CPT

## 2022-12-03 PROCEDURE — 80162 ASSAY OF DIGOXIN TOTAL: CPT

## 2022-12-03 PROCEDURE — 71045 X-RAY EXAM CHEST 1 VIEW: CPT

## 2022-12-03 PROCEDURE — 83735 ASSAY OF MAGNESIUM: CPT

## 2022-12-03 PROCEDURE — 85025 COMPLETE CBC W/AUTO DIFF WBC: CPT

## 2022-12-03 PROCEDURE — 99285 EMERGENCY DEPT VISIT HI MDM: CPT

## 2022-12-03 PROCEDURE — 96365 THER/PROPH/DIAG IV INF INIT: CPT

## 2022-12-03 PROCEDURE — 94640 AIRWAY INHALATION TREATMENT: CPT

## 2022-12-03 PROCEDURE — 96375 TX/PRO/DX INJ NEW DRUG ADDON: CPT

## 2022-12-03 NOTE — P.CNS
Date of Consult: 12/03/22


Reason for Consult: hypokalemia, ESRD 


Chief Complaint: low BP 


History of Present Illness: 





66F w/ PMHx of ESRD on HD TTS, chronic diastolic heart failure, afib, anemia, & 

renal osteodystrophy, 


pt was sent from HD unit for hypotesnion 


pt with chronic HX of hypotension , on midodridne , today noticed to have very 

low BP during dialysis , that did not respond to midodrine, no fluid removed 

during HD , in ER pt received 1 liter of NS , BP improved, but she noticed to be

bradycardic 








ROS 


General :  weakness, 


HEENT: dizziness 


Resp: denies SOB, cough or wheezes 


Cardiovascular: denied chest pain, palpitation  , no SOB


GI: denies abdominal pain, diarrhea or constipation 


: denies dysuria, urgency, foamy urine or blood tinged urine


Musculoskeletal: denies muscle aches, joint pain


Endo: denies polyuria and and polydipsia 


Extre: denies pain numbness and swelling 








Physical exam


General: AAOx3, NAD, obese


HEENT PERRLA, moist mucose membrane 


neck: supple, no elevated JVD


CHEST; CTAB, no wheezes or rales


HEART : RRR. Normal S1,2 no murmur or rub 


Abd: soft, Nt


Ext: edema , chronic venous stasis


Skin : No rash








A/P 


#End-stage renal disease on outpt HD TTS.  HD access: 


#Hypokalemia 


#Symptomatic bradycardia 


#Hypotension 


#Anemia of Chronic  kidney disease. 


#Chronic diastolic HF, afib.  


# Intradialytic hypotension. on  midodrine.








Plan 


No need for HD at this time , monitor potassium , will hold on replacement, BP 

improved , will hold IVF , plan to transfer pt to [facility] for further 

cardiology W/u 








Total time spent 65 minutes including documentation, reviewing labs , placing 

orders and discussing with medical team 








Allergies





cefepime Allergy (Verified 01/05/22 06:30)


   Hives


codeine Allergy (Verified 07/30/22 05:03)


   Itching


levofloxacin Allergy (Verified 01/05/22 06:30)


   Hives/Rash


morphine Allergy (Verified 07/30/22 05:03)


   Itching


Penicillins Allergy (Verified 01/05/22 06:30)


   Hives/Rash


sulfamethoxazole [From Bactrim] Allergy (Verified 07/30/22 05:03)


   Hives


trimethoprim [From Bactrim] Allergy (Verified 07/30/22 05:03)


   Hives





Home Medications: 








Apixaban [Eliquis] 2.5 mg PO BID 07/29/22 


Bumetanide 1 tab PO DAILY 07/29/22 


Digoxin [Lanoxin] 1 tab PO T,TH,S 07/29/22 


Metoprolol Tartrate 1 tab PO BID 07/29/22 


Midodrine HCl 1 tab PO TID 07/29/22 


Pantoprazole [Protonix Tab*] 1 tab PO BID 07/29/22 


Sevelamer Carbonate [Renvela] 800 mg PO TID 07/29/22 


Dicyclomine [Bentyl*] 1 tab PO TID PRN 11/11/22 


Ondansetron [Zofran (Odt)*] 8 mg PO TID PRN 11/11/22 


Tizanidine [Zanaflex*] 1 tab PO BID 11/11/22 








- Past Medical/Surgical History


Diabetic: No


-: Chronic hypotension


-: Hyperlipidemia


-: Chronic anticoagulation use


-: History of nephrolithiasis requiring stent placement


-: Recurrent UTI


-: End-stage renal disease


-: CHF


-: HTN


-: A-Fib


-: tubal ligation


-: dialysis port


-: cholecystectomy


-: right broken wrist


-: lasik


-: cataracts removed





- Family History


  ** Sister


Medical History: Cancer


Notes: per pt, sister has lung cancer and is being treated for a "spot on her 

brain"





- Social History


Alcohol use: No


CD- Drugs: No


Caffeine use: Yes





Physical Examination


Laboratory Data (last 24 hrs)





12/03/22 10:34: PT 14.0 H, INR 1.27


12/03/22 09:22: WBC 7.20, Hgb 9.5 L, Hct 29.3 L, Plt Count 93 L


12/03/22 09:22: Sodium 138, Potassium 3.2 L, BUN 19 H, Creatinine 5.40 H*, 

Glucose 151 H, Magnesium 2.1, Total Bilirubin 1.3 H, AST 11 L, ALT 16, Alkaline 

Phosphatase 126 H

## 2022-12-03 NOTE — XMS REPORT
Continuity of Care Document

                           Created on:December 3, 2022



Patient:SUZI SEARS

Sex:Female

:1955

External Reference #:[id number]





Demographics







                          Address                   [address]



                                                    [place] [address]

 

                          Home Phone                [phone]

 

                          Work Phone                [phone]

 

                          Mobile Phone              [phone]

 

                          Email Address             [email or web address]

 

                          Preferred Language        English

 

                          Marital Status            Unknown

 

                          [employer] Affiliation     Unknown

 

                          Race                      Unknown

 

                          Additional Race(s)        White



                                                    Unavailable

 

                          Ethnic Group              Unknown









Author







                          Organization              [facility]

t

 

                          Address                   [address]



                                                    [place] [address]

 

                          Phone                     [phone]









Support







                Name            Relationship    Address         Phone

 

                Jeimy Sears Spouse          [address] [phone]



                                                [place] [address] 

 

                JEIMY SEARS SPOU            [address] [phone]



                                                [place] [address] 

 

                JEIMY SEARS SPOU            [address]       [phone]

[phone]



                                                [place] [address] 

 

                CHITRA SEARS    SPOU            [address]       [phone]



                                                [place] [address] 

 

                Jeimy Sears Spouse          [phone]

[phone]



                                                [place] [address] 

 

                Jonah Sears   Child           Unavailable     [phone]









Care Team Providers







                    Name                Role                Phone

 

                    Peter WIGGINS, Charisse MARTINEZ Primary Care Physician [phone]

 

                    Clark Cuellar   Attending Clinician Unavailable

 

                    SARBJIT RHODES     Attending Clinician Unavailable

 

                    DO ALISIA REYES    Attending Clinician Unavailable

 

                    Adriel Sparks MD Attending Clinician [phone]

 

                    Daylin Little MD   Attending Clinician [phone]

 

                    Jacobo WIGGINS, Quinton Gonsalez Attending Clinician [phone]

[phone]

 

                    Martina Douglass MD  Attending Clinician [phone]

 

                    Louann Odom MA Attending Clinician Unavailable

 

                    Venancio Barajas      Attending Clinician Unavailable

 

                    Koko         Attending Clinician Unavailable

 

                    Addison WIGGINS, Louann Carrizales Attending Clinician [phone]

[phone]

 

                    Aki Dupree MD      Attending Clinician [phone]

 

                    Luc WIGGINS, Shelby Attending Clinician [phone]

 

                    Fantasma Squires       Attending Clinician [phone]

 

                    Charisse Green  Attending Clinician [phone]

 

                    Maurene WIGGINS, Martha Chu Attending Clinician [phone]

[phone]

 

                    Jasper WIGGINS, Parveen LIN Attending Clinician [phone]

 

                    Shaikh ROSALIE, Rosalio     Attending Clinician [phone]

 

                    Christine Saini DO Attending Clinician [phone]

 

                    Edinson WIGGINS, Trev Hays Attending Clinician [phone]

 

                    Hasmukh WIGGINS, Mariluz Sanchez Attending Clinician [phone]

 

                    Suzie Dominguez       Attending Clinician Unavailable

 

                    Hadley Medina         Attending Clinician Unavailable

 

                    Antionette MAGALLANES, Joana      Attending Clinician Unavailable

 

                    Miriam WIGGINS, Suresh Landry Attending Clinician [phone]

 

                    Nieves WIGGINS, Melissa PHAM Attending Clinician [phone]

[phone]

 

                    Lazaro WIGGINS, Naseem Attending Clinician [phone]

 

                    Elaina Clark MD Attending Clinician [phone]

 

                    Brenna Jacobs MD Attending Clinician [phone]

 

                    [email or web address]_J      Attending Clinician Unavailable

 

                    MD MARTHA LAMAR Attending Clinician Unavailable

 

                    JULIANNA KEANE  Attending Clinician Unavailable

 

                    MAGY EARL     Attending Clinician Unavailable

 

                    MELVIN MANZO   Attending Clinician Unavailable

 

                    MD MELVIN MANZO Attending Clinician Unavailable

 

                    MD DAYLIN LITTLE Attending Clinician Unavailable

 

                    MD MELISSA PICKETT Attending Clinician Unavailable

 

                    BRENNA JACOBS    Attending Clinician Unavailable

 

                    Brenna Jacobs    Attending Clinician [phone]

 

                    Abdi Schulz Attending Clinician [phone]

 

                    Gabriela Rosenthal Attending Clinician [phone]

[phone]

 

                    EMILIA BONILLA        Attending Clinician Unavailable

 

                    DR GOPAL ADEN    Attending Clinician Unavailable

 

                    Yousif Rhodes Attending Clinician [phone]

 

                    Randy Cunningham   Attending Clinician [phone]

 

                    Yoan Lei     Attending Clinician [phone]

 

                    Abbi Somers  Attending Clinician [phone]

 

                    Vignesh See Attending Clinician [phone]

 

                    Charisse Green  Admitting Clinician Unavailable

 

                    ZHEN JENKINS     Admitting Clinician Unavailable

 

                    MARTINA DOUGLASS     Admitting Clinician Unavailable

 

                    Anthony Elkins     Admitting Clinician Unavailable

 

                    [email or web address]_[email or web address]         Admitting Clinician Unavailable

 

                    AKI DUPREE         Admitting Clinician Unavailable

 

                    CHRISTINE SAINI       Admitting Clinician Unavailable

 

                    MD ROSALIO ANGULO     Admitting Clinician Unavailable

 

                    MELISSA PICKETT     Admitting Clinician Unavailable

 

                    DEBBY_EVELIN_Alec_EDUARDO      Admitting Clinician Unavailable

 

                    MD MARTHA LAMAR Admitting Clinician Unavailable

 

                    MELVIN MANZO   Admitting Clinician Unavailable

 

                    MD MELVIN MANZO Admitting Clinician Unavailable

 

                    DAYLIN LITTLE      Admitting Clinician Unavailable

 

                    MD KAUSHIK BECKHAM   Admitting Clinician Unavailable

 

                    MD MELISSA PICKETT Admitting Clinician Unavailable

 

                    DR GOPAL ADEN    Admitting Clinician Unavailable









Payers







           Payer Name Policy Type Policy Number Effective Date Expiration Date S

maryam

 

           [place]:            [id number] 2021            



           [employer]                       00:00:00              







Problems







       Condition Condition Condition Status Onset  Resolution Last   Treating Co

mments 

Source



       Name   Details Category        Date   Date   Treatment Clinician        



                                                 Date                 

 

       Chest pain Chest pain Disease Active 2022                             M

ethodi



       with high with high               1-05                               st



       risk of risk of               00:00:                             [facility]



       acute  acute                00                                 l



       coronary coronary                                                  



       syndrome syndrome                                                  

 

       Hyperkalem Hyperkalem Disease Active                              M

ethodi



       ia     ia                                                  st



                                   00:00:                             [facility]ospita



                                   00                                 l

 

       Fluid  Fluid  Disease Active                              Methodi



       overload overload                                              st



                                   00:00:                             Hospita



                                   00                                 l

 

       COVID-19 COVID-19 Disease Active                              [facility]

di



       virus  virus                                               st



       detected detected               00:00:                             Hospit

a



                                   00                                 l

 

       Acute back Acute back Disease Active                              M

ethodi



       pain   pain                 2-                               st



                                   00:00:                             [facility]ospita



                                   00                                 l

 

       Sepsis Sepsis Disease Active                              Methodi



                                   9-27                               st



                                   00:00:                             Hospita



                                   00                                 l

 

       ARF (acute ARF (acute Disease Active                              M

ethodi



       renal  renal                



       failure) failure)               00:00:                             Hospit

a



                                   00                                 l

 

       Hypoxemia Hypoxemia Disease Active                              Met

hodi



                                   



                                   00:00:                             Hospita



                                   00                                 l

 

       Pulmonary Pulmonary Disease Active                              Met

hodi



       edema  edema                



                                   00:00:                             Hospita



                                   00                                 l

 

       UTI    UTI    Disease Active                              Methodi



       (urinary (urinary               



       tract  tract                00:00:                             [facility]osp[facility]



       infection) infection)               00                                 l

 

       Shortness Shortness Disease Active                              Met

hodi



       of breath of breath               



                                   00:00:                             Hospita



                                   00                                 l

 

       Venous Venous Disease Active                              Methodi



       stasis stasis               



       ulcer of ulcer of               00:00:                             Hospit

a



       lower  lower                00                                 l



       extremity extremity                                                  

 

       Weight Weight Disease Active                              Methodi



       gain   gain                 



                                   00:00:                             [facility]ospita



                                   00                                 l

 

       Pain in Pain in Disease Active                              Methodi



       wrist  wrist                



                                   00:00:                             [facility]ospita



                                   00                                 l

 

       Prerenal Prerenal Disease Active                              [facility]

di



       azotemia azotemia               



                                   00:00:                             [facility]ospita



                                   00                                 l

 

       Proteinuri Proteinuri Disease Active                              M

ethodi



       a      a                    



                                   00:00:                             [facility]ospita



                                   00                                 l

 

       Peripheral Peripheral Disease Active                              M

ethodi



       venous venous               



       insufficie insufficie               00:00:                             Ho

spita



       ncy    ncy                  00                                 l

 

       Malignant Malignant Disease Active                              Met

hodi



       neoplasm neoplasm               



       of skin of of skin of               00:00:                             Ho

spita



       upper  upper                00                                 l



       extremity extremity                                                  

 

       Migraine Migraine Disease Active                              [facility]

di



       headache headache               



                                   00:00:                             [facility]ospita



                                   00                                 l

 

       Diabetes Diabetes Disease Active                              [facility]

di



       mellitus mellitus               



                                   00:00:                             [facility]ospita



                                   00                                 l

 

       Dysuria Dysuria Disease Active                              Methodi



                                   



                                   00:00:                             [facility]ospita



                                   00                                 l

 

       Edema  Edema  Disease Active                              Methodi



                                   



                                   00:00:                             Hospita



                                   00                                 l

 

       Hyperlipid Hyperlipid Disease Active                       Overview

: Silverio santiago                 



                                   00:00:                      g of this Hospita



                                   00                          note   l



                                                               might be 



                                                               different 



                                                               from the 



                                                               original. 



                                                               Data   



                                                               migrated 



                                                               from [employer] 



                                                               [employer] 



                                                               y on   



                                                               5/30/15.D 



                                                               nancie    



                                                               migrated 



                                                               from [employer] 



                                                               [employer] 



                                                               y on   



                                                               5/30/15.D 



                                                               nancie    



                                                               migrated 



                                                               from [employer] 



                                                               [employer] 



                                                               y on   



                                                               5/30/15.D 



                                                               nancie    



                                                               migrated 



                                                               from [employer] 



                                                               [employer] 



                                                               y on   



                                                               5/30/15.D 



                                                               nancie    



                                                               migrated 



                                                               from [employer] 



                                                               [employer] 



                                                               y on   



                                                               5/30/15. 

 

       Hyperparat Hyperparat Disease Active                              M

ethodi



       hyroidism hyroidism                                              st



                                   00:00:                             Hospita



                                   00                                 l

 

       Hyperurice Hyperurice Disease Active                       Overview

: Methodi



       keegan    keegan                                          Formattin st



                                   00:00:                      g of this [facility]ospita



                                   00                          note   l



                                                               might be 



                                                               different 



                                                               from the 



                                                               original. 



                                                               Data   



                                                               migrated 



                                                               from [employer] 



                                                               [employer] 



                                                               y on   



                                                               5/30/15.D 



                                                               nancie    



                                                               migrated 



                                                               from [employer] 



                                                               [employer] 



                                                               y on   



                                                               5/30/15.D 



                                                               nancie    



                                                               migrated 



                                                               from [employer] 



                                                               [employer] 



                                                               y on   



                                                               5/30/15.D 



                                                               nancie    



                                                               migrated 



                                                               from [employer] 



                                                               [employer] 



                                                               y on   



                                                               5/30/15.D 



                                                               nancie    



                                                               migrated 



                                                               from [employer] 



                                                               [employer] 



                                                               y on   



                                                               5/30/15. 

 

       Abnormal Abnormal Disease Active                              [facility]

di



       urinalysis urinalysis               



                                   00:00:                             Hospita



                                   00                                 l

 

       Abnormal Abnormal Disease Active                              [facility]

di



       gait   gait                 



                                   00:00:                             Hospita



                                   00                                 l

 

       Ankle  Ankle  Disease Active                              Methodi



       swelling swelling               



                                   00:00:                             Hospita



                                   00                                 l

 

       Atrial Atrial Disease Active                              Methodi



       fibrillati fibrillati                                              st



       on     on                   00:00:                             Hospita



                                   00                                 l

 

       Chronic Chronic Disease Active                              Methodi



       venous venous               



       stasis stasis               00:00:                             Hospita



                                   00                                 l

 

       Abdominal Abdominal Disease Active                              Met

hodi



       pain   pain                                                st



                                   00:00:                             Hospita



                                   00                                 l

 

       CHF    CHF    Disease Active                              Methodi



       (congestiv (congestiv                                              st



       e heart e heart               00:00:                             Hospita



       failure) failure)               00                                 l

 

       Flank pain Flank pain Disease Active                              M

ethodi



                                                                  st



                                   00:00:                             Hospita



                                   00                                 l

 

       Renal  Renal  Disease Active                       Overview: Method

i



       stone  stone                                        Formattin st



                                   00:00:                      g of this Hospita



                                   00                          note   l



                                                               might be 



                                                               different 



                                                               from the 



                                                               original. 



                                                               Added  



                                                               automatic 



                                                               ally from 



                                                               request 



                                                               for    



                                                               surgery 



                                                               [id number] 

 

       SOLO      SOLO  Diagnosis Active 2021               [facility]

[facility]



              Active                         12:03:00               l



              2021               00:00:                             Donny

n



              [facility]               00                                 



              [place]                                                    

 

       Symptomati Symptomati Disease Active                       Overview

: Methodi



       c anemia c anemia               4-15                        Formattin st



                                   00:00:                      g of this [facility]osp[facility]



                                   00                          note   l



                                                               might be 



                                                               different 



                                                               from the 



                                                               original. 



                                                               Added  



                                                               automatic 



                                                               ally from 



                                                               request 



                                                               for    



                                                               surgery 



                                                               [id number] 

 

       Acute  Acute  Disease Active                              Methodi



       gallstone gallstone               3-12                               st



       pancreatit pancreatit               00:00:                             Ho

spita



       is     is                   00                                 l

 

       Hypervolem Hypervolem Disease Active                              M

ethodi



       ia     ia                                                  st



                                   00:00:                             [facility]osp[facility]



                                   00                                 l

 

       Hypotensio Hypotensio Disease Active 2020                             M

ethodi



       n      n                    2-                               st



                                   00:00:                             [facility]ospita



                                   00                                 l

 

       Respirator Respirator Disease Active 2020                             M

ethodi



       y failure y failure               1-16                               st



                                   00:00:                             [facility]osp[facility]



                                   00                                 l

 

       Acute  Acute  Disease Active 2020                             Methodi



       cystitis cystitis               1-13                               st



       without without               00:00:                             [facility]osp[facility]



       hematuria hematuria               00                                 l

 

       Class 3 Class 3 Disease Active 2020                             Methodi



       severe severe               0-26                               st



       obesity obesity               00:00:                             [facility]osp[facility]



       without without               00                                 l



       serious serious                                                  



       comorbidit comorbidit                                                  



       y with y with                                                  



       body mass body mass                                                  



       index  index                                                   



       (BMI) of (BMI) of                                                  



       60.0 to 60.0 to                                                  



       69.9 in 69.9 in                                                  



       adult  adult                                                   

 

       Acute  Acute  Disease Active 2020                             Methodi



       renal  renal                0-26                               st



       failure failure               00:00:                             [facility]ospita



       (ARF)  (ARF)                00                                 l

 

       Acute  Acute  Disease Active 2020                             Methodi



       respirator respirator               0-26                               st



       y failure y failure               00:00:                             Hosp

mimi



       with   with                 00                                 l



       hypoxia hypoxia                                                  



       and    and                                                     



       hypercapni hypercapni                                                  



       a      a                                                       

 

       Acute  Acute  Disease Active 2020                             Methodi



       congestive congestive               0-24                               st



       heart  heart                00:00:                             Hospita



       failure failure               00                                 l

 

       Left foot Left foot Disease Active 2019                             Met

hodi



       infection infection               0-15                               st



                                   00:00:                             [facility]ospita



                                   00                                 l

 

       Cellulitis Cellulitis Disease Active                              M

ethodi



       of left of left               9-15                               st



       leg    leg                  00:00:                             Hospita



                                   00                                 l

 

       Bacteremia Bacteremia Disease Active                              M

ethodi



       due to due to                                              st



       Pseudomona Pseudomona               00:00:                             Ho

selvin



       s      s                    00                                 l

 

       COLON   COLON Diagnosis Active 2017               [facility]

nataliiaa



       CANCER CANCER               -          05:49:00               l



       SCREENING- SCREENING-               00:00:                             He

adrienne



       Z12.11 Z12.11               00                                 



              Active                                                  



              2017                                                  



                [facility]                                                  



              [place]                                                    

 

       R92.1 -  R92.1 - Diagnosis Active 2016               

[facility]



       MAMMOGRAPH MAMMOGRAPH                         15:55:00               

l



       IC     IC                   00:01:                             [facility]



       CALCIFCN CALCIFCN               00                                 



       FOUND ON FOUND ON                                                  



              Active                                                  



              2016                                                  



                [facility]                                                  



              [place]                                                  

 

       Z12.31 -  Z12.31 - Diagnosis Active 2016             

  [facility]



       ENCNTR ENCNTR               -          07:08:00               l



       SCREEN SCREEN               00:01:                             [facility]



       MAMMOGRAM MAMMOGRAM               00                                 



       FOR MA FOR MA                                                  



              Active                                                  



              2016                                                  



               [facility]                                                  



              [place]                                                  

 

       RT WRIST  RT WRIST Diagnosis Active 2017             

  [facility]



       DISTAL DISTAL               1-          02:03:00               l



       RADIUS RADIUS               09:00:                             [facility]



       CLSD FX CLSD FX               00                                 



              Active                                                  



              2016                                                  



              [facility]                                                  



              Bone &                                                  



              Joint                                                   

 

       Abnormal Abnormal Disease Active                       Overview: Me

thodi



       glucose glucose               7-02                        Formattin st



       level  level                00:00:                      g of this Hospita



                                   00                          note   l



                                                               might be 



                                                               different 



                                                               from the 



                                                               original. 



                                                               Data   



                                                               migrated 



                                                               from [facility] 



                                                               [facility] 



                                                               y on   



                                                               7/8/15.Da 



                                                               ta     



                                                               migrated 



                                                               from [facility] 



                                                               [facility] 



                                                               y on   



                                                               7/8/15.Da 



                                                               ta     



                                                               migrated 



                                                               from [facility] 



                                                               [facility] 



                                                               y on   



                                                               7/8/15. 

 

       Obstructiv Obstructiv Disease Active                       Overview

: Methodi



       e sleep e sleep               2-04                        Formattin st



       apnea  apnea                00:00:                      g of this Hospita



       syndrome syndrome               00                          note   l



                                                               might be 



                                                               different 



                                                               from the 



                                                               original. 



                                                               Data   



                                                               migrated 



                                                               from [facility] 



                                                               [facility] 



                                                               y on   



                                                               5/30/15.D 



                                                               nancie    



                                                               migrated 



                                                               from [facility] 



                                                               [facility] 



                                                               y on   



                                                               5/30/15.D 



                                                               nancie    



                                                               migrated 



                                                               from [facility] 



                                                               [facility] 



                                                               y on   



                                                               5/30/15.D 



                                                               nancie    



                                                               migrated 



                                                               from [facility] 



                                                               [facility] 



                                                               y on   



                                                               5/30/15.D 



                                                               nancie    



                                                               migrated 



                                                               from [facility] 



                                                               [facility] 



                                                               y on   



                                                               5/30/15.D 



                                                               nancie    



                                                               migrated 



                                                               from [facility] 



                                                               [facility] 



                                                               y on   



                                                               5/30/15. 

 

       Lymphedema Lymphedema Disease Active                       Overview

: Methodi



                                   2-04                        Formattin st



                                   00:00:                      g of this Hospita



                                   00                          note   l



                                                               might be 



                                                               different 



                                                               from the 



                                                               original. 



                                                               Data   



                                                               migrated 



                                                               from [facility] 



                                                               [facility] 



                                                               y on   



                                                               5/30/15. 

 

       Depressive Depressive Disease Active                       Overview

: Methodi



       disorder disorder               4-24                        Formattin st



                                   00:00:                      g of this Hospita



                                   00                          note   l



                                                               might be 



                                                               different 



                                                               from the 



                                                               original. 



                                                               Data   



                                                               migrated 



                                                               from [facility] 



                                                               [facility] 



                                                               y on   



                                                               5/30/15.D 



                                                               nancie    



                                                               migrated 



                                                               from [facility] 



                                                               [facility] 



                                                               y on   



                                                               5/30/15.D 



                                                               nancie    



                                                               migrated 



                                                               from [facility] 



                                                               [facility] 



                                                               y on   



                                                               5/30/15.D 



                                                               nancie    



                                                               migrated 



                                                               from [facility] 



                                                               [facility] 



                                                               y on   



                                                               5/30/15. 

 

       Hypothyroi Hypothyroi Disease Active                       Overview

: Methodi



       dism   dism                 4-24                        Formattin st



                                   00:00:                      g of this Hospita



                                   00                          note   l



                                                               might be 



                                                               different 



                                                               from the 



                                                               original. 



                                                               Data   



                                                               migrated 



                                                               from [facility] 



                                                               [facility] 



                                                               y on   



                                                               5/30/15. 

 

       Obesity  Obesity Problem Active 2016               [facility]

moria



       (disorder) (disorder)               8          00:23:22               

l



              Active               00:00:                             [place]



              2012               00                                 



              Problem                                                  



              2016                                                  



              Data                                                    



              migrated                                                  



              from [facility]                                                  



              [facility]                                                  



              on                                                      



              5/30/15.                                                  



              [facility]                                                  



              Tessa,                                                  



              [facility]                                                  



              [place],                                                  



              [facility]                                                     



              [place]                                                  



              [facility]                                                  



              [facility]                                                  



              Bone &                                                  



              Joint                                                   

 

       Cobalamin Cobalamin Disease Active                       Overview: 

Methodi



       deficiency deficiency               7-                        Formattin

 st



                                   00:00:                      g of this Hospita



                                   00                          note   l



                                                               might be 



                                                               different 



                                                               from the 



                                                               original. 



                                                               Data   



                                                               migrated 



                                                               from [facility] 



                                                               [facility] 



                                                               y on   



                                                               5/30/15.D 



                                                               nancie    



                                                               migrated 



                                                               from [facility] 



                                                               [facility] 



                                                               y on   



                                                               5/30/15.D 



                                                               nancie    



                                                               migrated 



                                                               from [facility] 



                                                               [facility] 



                                                               y on   



                                                               5/30/15.D 



                                                               nancie    



                                                               migrated 



                                                               from [facility] 



                                                               [facility] 



                                                               y on   



                                                               5/30/15. 

 

       Hypertensi  Hypertens Problem Active 2016            

   [facility]



       ve episode kyle                  7-10          00:23:22               l



       (disorder) episode               00:00:                             [place]

nn



              (disorder)               00                                 



              Active                                                  



              07/10/2012                                                  



              Problem                                                  



              2016                                                  



              Data                                                    



              migrated                                                  



              from [facility]                                                  



              [facility]                                                  



              on                                                      



              5/30/15.                                                  



              [facility]                                                  



              [place],                                                  



              [facility]                                                  



              [facility],                                                  



              [facility]                                                     



              [place]                                                  



              [facility]                                                  



              [place]                                                  



              Bone &                                                  



              Joint                                                   

 

       Edema of   Edema of Problem Active               2022              

 [facility]



       lower  lower                              03:11:55               l



       extremity extremity                                                  [place]

daryl



       (finding) (finding)                                                  



              Active                                                  



              Problem                                                  



              2022                                                  



                                                                    



              Medical                                                  



              Group,                                                  



              [facility]                                                  



              [place],                                                  



              [place]                                                  

 

       Hypertensi        Problem Active               2022               M

emoria



       ve     Hypertensi                             03:11:55               l



       disorder, ve                                                      [place]



       systemic disorder,                                                  



       arterial systemic                                                  



       (disorder) arterial                                                  



              (disorder)                                                  



              Active                                                  



              Problem                                                  



              2022                                                  



                                                                    



              Medical                                                  



              Group,[facility]                                                    



              Specialty                                                  



              Hospital                                                  



              [facility] [place]                                                  



              Land,                                                  



              [facility]                                                  



              [place],                                                  



              [place]                                                  

 

       Hypertensi  Hypertens Problem Active               2022            

   [facility]



       ve renal kyle renal                             03:11:55               l



       disease disease                                                  [place]



       (disorder) (disorder)                                                  



              Active                                                  



              Problem                                                  



              2022                                                  



               Medical                                                  



              Group,                                                  



              [facility]                                                  



              [place],                                                  



              [place]                                                  

 

       Lymphedema  Lymphedem Problem Active               2022            

   [facility]



       of lower a of lower                             03:11:55               l



       extremity extremity                                                  [place]erm

daryl



       (disorder) (disorder)                                                  



              Active                                                  



              Problem                                                  



              2022                                                  



               Medical                                                  



              Group,                                                  



              [facility]                                                  



              [place],                                                  



              [place]                                                  

 

       Morbid  Morbid Problem Active               2022               [place]

milan



       obesity obesity                             03:11:55               l



       (disorder) (disorder)                                                  He

rmann



              Active                                                  



              Problem                                                  



              2022                                                  



               Medical                                                  



              Group,                                                  



              [facility]                                                    



              Tessa,                                                  



              [facility] [place]                                                  



              Land,[facility]                                                     



              [place]                                                  



              [facility]                                                  



              [place]                                                  



              [facility]one &                                                  



              Joint,                                                  



              [place]                                                  

 

       Post-disch  Post-disc Problem Active               2022            

   [facility]



       arge   harge                              03:11:55               l



       follow-up follow-up                                                  Abraham brito



       (finding) (finding)                                                  



              Active                                                  



              Problem                                                  



              2022                                                  



               Medical                                                  



              Group,                                                  



              [place]                                                  

 

       Stasis  Stasis Problem Active               2022               [place]

milan



       ulcer  ulcer                              03:11:55               l



       (disorder) (disorder)                                                  He

rmann



              Active                                                  



              Problem                                                  



              2022                                                  



               Medical                                                  



              Group,                                                  



              [facility]                                                  



              [place],                                                  



              [place]                                                  

 

       Swelling -  Swelling Problem Active               2022             

  [facility]



       edema - - edema -                             03:11:55               l



       symptom symptom                                                  [place]



       (finding) (finding)                                                  



              Active                                                  



              Problem                                                  



              2022                                                  



               Medical                                                  



              Group,                                                  



              [facility]                                                  



              [place],                                                  



              [place]                                                  

 

       Kidney  Kidney Problem Active               2017               [place]

milan



       disease disease                             03:07:28               l



       (disorder) (disorder)                                                  He

rmann



              Active                                                  



              Problem                                                  



              2017                                                  



                [place]                                                  



              [facility]                                                    

 

       RIGHT   RIGHT Diagnosis Active               2016               Mem

oria



       WRIST  WRIST                              15:06:00               l



       DISTAL DISTAL                                                  [place]



       RADIUS RADIUS                                                  



       CLSD FX CLSD FX                                                  



              Active [facility]                                                  



              [place]                                                  



              [facility] &                                                  



              Joint                                                   

 

       DISTAL  DISTAL Diagnosis Active               2016               Me

moria



       RADIUS RADIUS                             09:46:00               l



       CLSD FX CLSD FX                                                  [place]



              Active                                                    



              [facility]                                                     



              [place]                                                  



              Trace                                                   

 

       Long-term  Long-term Problem Active               2022             

  [facility]



       current current                             03:11:55               l



       use of use of                                                  [place]



       drug   drug                                                    



       therapy therapy                                                  



       (situation (situation                                                  



       )      ) Active                                                  



              Problem                                                  



              2022                                                  



               Medical                                                  



              Group                                                   

 

       Cholestero  Cholester Problem                      2016            

   [facility]



       l      ol                                 05:02:18               l



       (substance (substance                                                  He

rmann



       )      ) Problem                                                  



              2016                                                  



               Surgical                                                  



              [facility]                                                    

 

       Sleep   Sleep Problem                      2016               Memor

ia



       apnea  apnea                              05:02:18               l



       (finding) (finding)                                                  [place]

daryl



              Problem                                                  



              2016                                                  



              <sup>2</matos                                                  



              p>does not                                                  



              use cpap                                                  



              Surgical                                                  



              [facility]                                                    

 

       Calcificat  Calcifica Problem Resolve               2022           

    [facility]



       ion of tion of        d                    03:11:55               l



       breast breast                                                  [place]



       (finding) (finding)                                                  



              Resolved                                                  



              Problem                                                  



              2022                                                  



              Left                                                   



              Medical                                                  



              Group,                                                  



              [facility]                                                    



              Tessa,                                                  



              [facility]                                                  



              [facility],                                                  



              [facility]                                                     



              [place]                                                  



              Trace,[facility]                                                  



              [place]                                                  



              [facility]                                                  



              Joint,                                                  



              [place]                                                  

 

       Acute   Acute Problem Active               2020               Memor

ia



       urinary urinary                             22:36:35               l



       tract  tract                                                   [place]



       infection infection                                                  



       (disorder) (disorder)                                                  



              Active                                                  



              Problem                                                  



              2020                                                  



               Medical                                                  



              Group                                                   

 

       Chronic  Chronic Problem Active               2020               [place]



       renal  renal                              22:36:35               l



       impairment impairment                                                  He

rmann



       (disorder) (disorder)                                                  



              Active                                                  



              Problem                                                  



              2020                                                  



                                                                    



              Medical                                                  



              Group,                                                  



              [facility]                                                    



              Tessa,                                                  



              [facility]                                                  



              Land,                                                  



              [facility]                                                     



              [place]                                                  



              [facility]                                                  



              [place]                                                  



              [facility]one [facility]                                                  



              Joint,                                                  



              [place]                                                  

 

       Benign  Benign Problem Active               2022               [place]

[place]an



       essential essential                             03:11:55               l



       hypertensi hypertensi                                                  He

rmann



       on     on                                                      



       (disorder) (disorder)                                                  



              Active                                                  



              Problem                                                  



              2022                                                  



               Medical                                                  



              Group,                                                  



              ILA Zarate,                                                  



              [facility]                                                  



              Land,                                                  



              [facility]                                                     



              [place]                                                  



              [facility]                                                  



              [place]                                                  



              [facility]                                                  



              Joint,                                                  



              [place]                                                  

 

       Cardiorena  Cardioren Problem Active               2022            

   [facility]



       l syndrome al                                 03:11:55               l



       (disorder) syndrome                                                  [place]

daryl



              (disorder)                                                  



               Active                                                  



              Problem                                                  



              2022                                                  



               Medical                                                  



              Group,                                                  



              [facility]                                                  



              [place],                                                  



              [place]                                                  

 

       Chronic  Chronic Problem Active               2022               Me

moria



       kidney kidney                             03:11:55               l



       disease disease                                                  [place]



       (disorder) (disorder)                                                  



              Active                                                  



              Problem                                                  



              2022                                                  



               Medical                                                  



              Group,                                                  



              [facility]                                                  



              [place],                                                  



              [place]                                                  



                                                                      

 

       Chronic  Chronic Problem Active               2022               Me

moria



       kidney kidney                             03:11:55               l



       disease disease                                                  [place]



       stage 3 stage 3                                                  



       (disorder) (disorder)                                                  



               Active                                                  



              Problem                                                  



              2022                                                  



               Medical                                                  



              Group,                                                  



              [facility]                                                  



              Land,                                                  



              [place]                                                  

 

       Chronic  Chronic Problem Active               2022               Me

moria



       pain   pain                               03:11:55               l



       (finding) (finding)                                                  [place]

daryl



              Active                                                  



              Problem                                                  



              2022                                                  



               Medical                                                  



              Group,                                                  



              [facility]                                                  



              [place],                                                  



              [place]                                                  

 

       Dependence  Dependenc Problem Active               2022            

   [facility]



       on     e on                               03:11:55               l



       supplement supplement                                                  Neel deleno



       al oxygen al oxygen                                                  



       (finding) (finding)                                                  



              Active                                                  



              Problem                                                  



              2022                                                  



               Medical                                                  



              Group,                                                  



              [place]                                                  

 

       Acute   Acute Problem Resolve 2016               

[facility]



       otitis otitis        d         00:23:22 00:23:22               l



       media  media                00:00:                             [place]



       (disorder) (disorder)               00                                 



              Resolved                                                  



              2013                                                  



               Problem                                                  



              2016                                                  



              Data                                                    



              migrated                                                  



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                           (four)           l



     tablet                                         times a           



                                                  day.           

 

     apixaban      -0 - No             2.5mg Q.5D Take 2.5           [facility]

[facility]



     (ELIQUIS) 5                                mg by           st



     mg tablet      18:51: 00:00                          mouth 2           Hosp

mimi



               03   :00                           (two)           l



                                                  times a           



                                                  day.           

 

     sevelamer      -0 - No             1600mg [id number] Take 1,600    

       [facility]



     (RENVELA)                           [id number] mg by           st



     800 mg      18:50: 00:00                     3D   mouth 3           [facility]



     tablet      58   :00                           (three)           l



                                                  times a           



                                                  day with           



                                                  meals.           

 

     oxyCODONE      2022- No        [id number] 5mg  Q4H  Take 5 mg           M

[facility]i



     (ROXICODONE                                by mouth           st



     ) 5 MG      18:50: 00:00                          every 4           [facility]



     immediate      30   :00                           (four)           l



     release                                         hours as           



     tablet                                         needed for           



                                                  moderate           



                                                  pain           



                                                  .acute           



                                                  pain.           

 

     tiZANidine      0      Yes            4mg  Q.5D Take 1           [facility]

di



     (ZANAFLEX)      9-                               tablet (4           st



     4 MG tablet      00:00:                               mg total)           [facility]

ospita



               00                                 by mouth 2           l



                                                  (two)           



                                                  times a           



                                                  day as           



                                                  needed for           



                                                  muscle           



                                                  spasms.           

 

     promethazin      -0      Yes            25mg Q6H  Take 1           [facility]

farhana



     e         8-01                               tablet (25           st



     (PHENERGAN)      00:00:                               mg total)           [facility]

ospita



     25 MG      00                                 by mouth           l



     tablet                                         every 6           



                                                  (six)           



                                                  hours as           



                                                  needed for           



                                                  vomiting           



                                                  or nausea.           

 

     promethazin      -0      Yes            50mg [id number] Take 1         

  [facility]



     e         7-28                          [id number] tablet (50           st



     (PHENERGAN)      00:00:                          3D   mg total)           [facility]

ospita



     50 MG      00                                 by mouth 3           l



     tablet                                         (three)           



                                                  times a           



                                                  day as           



                                                  needed for           



                                                  nausea or           



                                                  vomiting.           

 

     diclofenac      -0      Yes            75mg Q.5D Take 1           [facility]

di



     (VOLTAREN)      7-26                               tablet (75           st



     75 MG EC      00:00:                               mg total)           [facility]

mimi



     tablet      00                                 by mouth 2           l



                                                  (two)           



                                                  times a           



                                                  day.           

 

     fluconazole            Yes            150mg Q7D  Take 1           [facility]

[facility]



     (DIFLUCAN)      7-25                               tablet           st



     150 MG      00:00:                               (150 mg           [facility]



     tablet      00                                 total) by           l



                                                  mouth once           



                                                  a week. On           



                                                  Monday           

 

     DULoxetine       No             60mg QD   Take 60 mg           

[facility]



     (CYMBALTA)                                by mouth           st



     60 MG      18:01: 00:00                          daily.           [facility]



     capsule      54   :00                                          l

 

     gabapentin      2022- No             300mg Q.5D Take 300           M

[facility]i



     (NEURONTIN)      -09                          mg by           st



     100 mg      18:01: 00:00                          mouth 2           [facility]



     capsule      54   :00                           (two)           l



                                                  times a           



                                                  day.           

 

     allopurinoL       No             100mg QD   Take 100           

[facility]



     (ZYLOPRIM)      -                          mg by           st



     100 MG      18:01: 00:00                          mouth           [facility]



     tablet      54   :00                           daily.           l

 

     Zithromax            Yes                      See            [facility]



     Z-Mata 250      6-29                               Instructio           l



     mg oral      21:14:                               ns, Take 2           [facility]

d[facility]



     tablet      00                                 tablets by           



                                                  mouth the           



                                                  first day           



                                                  then 1           



                                                  tablet by           



                                                  mouth days           



                                                  2-5. (Pt           



                                                  is on           



                                                  hemodialys           



                                                  is)., X 5           



                                                  day, # 6           



                                                  tab, 0           



                                                  Refill(s),           



                                                  Pharmacy:           



                                                  [facility],           



                                                  [id number],           



                                                  cm,            



                                                  22           



                                                  14:52:00           



                                                  CDT,           



                                                  Height,           



                                                  106.818,           



                                                  kg,            



                                                  22           



                                                  14...           

 

     Zithromax            Yes                      See            [facility]



     Z-Mata 250      6-29                               Instructio           l



     mg oral      21:14:                               ns, Take 2           [facility]

daryl



     tablet      00                                 tablets by           



                                                  mouth the           



                                                  first day           



                                                  then 1           



                                                  tablet by           



                                                  mouth days           



                                                  2-5. (Pt           



                                                  is on           



                                                  hemodialys           



                                                  is)., X 5           



                                                  day, # 6           



                                                  tab, 0           



                                                  Refill(s),           



                                                  Pharmacy:           



                                                  [facility],           



                                                  [id number],           



                                                  cm,            



                                                  22           



                                                  14:52:00           



                                                  CDT,           



                                                  Height,           



                                                  106.818,           



                                                  kg,            



                                                  22           



                                                  14...           

 

     Zithromax            Yes                      See            [facility]



     Z-Mata 250      6-29                               Instructio           l



     mg oral      21:14:                               ns, Take 2           [facility]

daryl



     tablet      00                                 tablets by           



                                                  mouth the           



                                                  first day           



                                                  then 1           



                                                  tablet by           



                                                  mouth days           



                                                  2-5. (Pt           



                                                  is on           



                                                  hemodialys           



                                                  is)., X 5           



                                                  day, # 6           



                                                  tab, 0           



                                                  Refill(s),           



                                                  Pharmacy:           



                                                  [facility],           



                                                  [id number],           



                                                  cm,            



                                                  22           



                                                  14:52:00           



                                                  CDT,           



                                                  Height,           



                                                  106.818,           



                                                  kg,            



                                                  22           



                                                  14...           

 

     [facility]romax      2022-0      [facility]                      [facility]                               In[facility]           l



     mg oral      21:14:                               ns, Take 2           [facility]

daryl



     tablet      00                                 tablets by           



                                                  mouth the           



                                                  first day           



                                                  then 1           



                                                  tablet by           



                                                  mouth days           



                                                  2-5. (Pt           



                                                  is on           



                                                  hemodialys           



                                                  is)., X 5           



                                                  day, # 6           



                                                  tab, 0           



                                                  Refill(s),           



                                                  Pharmacy:           



                                                  [facility],           



                                                  [id number],           



                                                  cm,            



                                                  22           



                                                  14:52:00           



                                                  CDT,           



                                                  Height,           



                                                  106.818,           



                                                  kg,            



                                                  22           



                                                  14...           

 

     Velphoro      2022-0      Yes                      500 mg,           [facility]

a



               6-29                               CHEW,           l



               20:13:                               Daily,           [place]



               00                                 take with           



                                                  food, 0           



                                                  Refill(s)           

 

     Velphoro      2022-0      Yes                      500 mg,           [facility]

a



               6-29                               CHEW,           l



               20:13:                               Daily,           [place]



               00                                 take with           



                                                  food, 0           



                                                  Refill(s)           

 

     Velphoro      2022-0      Yes                      500 mg,           [facility]

a



               6-29                               CHEW,           l



               20:13:                               Daily,           [place]



               00                                 take with           



                                                  food, 0           



                                                  Refill(s)           

 

     Velphoro      2022-0      Yes                      500 mg,           [facility]

a



               6-29                               CHEW,           l



               20:13:                               Daily,           [place]



               00                                 take with           



                                                  food, 0           



                                                  Refill(s)           

 

     sevelamer      2022-0      Yes                      2,400 mg =           [facility]

[facility]



     carbonate      6-29                               3 tab, PO,           l



     800 mg oral      20:12:                               TID-Meals,           

[place]



     tablet      00                                 # 270 tab,           



                                                  0              



                                                  Refill(s)           

 

     sevelamer      2022-0      Yes                      2,400 mg =           [facility]

[facility]



     carbonate      6-29                               3 tab, PO,           l



     800 mg oral      20:12:                               TID-Meals,           

[facility]



     tablet      00                                 # 270 tab,           



                                                  0              



                                                  Refill(s)           

 

     sevelamer      2022-0      Yes                      2,400 mg =           [facility]

[facility]



     carbonate      6-29                               3 tab, PO,           l



     800 mg oral      20:12:                               TID-Meals,           

[place]



     tablet      00                                 # 270 tab,           



                                                  0              



                                                  Refill(s)           

 

     sevelamer            Yes                      2,400 mg =           [facility]

[facility]



     carbonate      6-29                               3 tab, PO,           l



     800 mg oral      20:12:                               TID-Meals,           

[facility]



     tablet      00                                 # 270 tab,           



                                                  0              



                                                  Refill(s)           

 

     pantoprazol      0      Yes                      40 mg = 1           [facility]

[facility]



     e 40 mg      6-29                               tab, PO,           l



     oral      20:11:                               Daily, 0           [facility]



     enteric      00                                 Refill(s)           



     coated                                                        



     tablet                                                        

 

     pantoprazol      0      Yes                      40 mg = 1           [facility]

[facility]



     e 40 mg      6-29                               tab, PO,           l



     oral      20:11:                               Daily, 0           [facility]



     enteric      00                                 Refill(s)           



     coated                                                        



     tablet                                                        

 

     pantoprazol      0      Yes                      40 mg = 1           [facility]

[facility]



     e 40 mg      6-29                               tab, PO,           l



     oral      20:11:                               Daily, 0           [place]



     enteric      00                                 Refill(s)           



     coated                                                        



     tablet                                                        

 

     pantoprazol      0      Yes                      40 mg = 1           [facility]

[facility]



     e 40 mg      6-29                               tab, PO,           l



     oral      20:11:                               Daily, 0           [facility]



     enteric      00                                 Refill(s)           



     coated                                                        



     tablet                                                        

 

     oxyCODONE 5            Yes                      5 mg = 1           [facility]

[facility]



     mg oral      6-29                               tab, PO,           l



     tablet,      20:10:                               Q4H, PRN           [facility]

n



     immediate      00                                 Pain, prn,           



     release                                         0              



                                                  Refill(s)           

 

     oxyCODONE 5            Yes                      5 mg = 1           [facility]

[facility]



     mg oral      6-29                               tab, PO,           l



     tablet,      20:10:                               Q4H, PRN           [facility]

n



     immediate      00                                 Pain, prn,           



     release                                         0              



                                                  Refill(s)           

 

     oxyCODONE 5      0      Yes                      5 mg = 1           [facility]

[facility]



     mg oral      6-29                               tab, PO,           l



     tablet,      20:10:                               Q4H, PRN           [facility]

n



     immediate      00                                 Pain, prn,           



     release                                         0              



                                                  Refill(s)           

 

     oxyCODONE 5            Yes                      5 mg = 1           [facility]

[facility]



     mg oral      6-29                               tab, PO,           l



     tablet,      20:10:                               Q4H, PRN           [facility]

n



     immediate      00                                 Pain, prn,           



     release                                         0              



                                                  Refill(s)           

 

     ondansetron      2022-0      Yes                      4 mg = 1           [facility]

[facility]



     4 mg oral      6-29                               tab, PO,           l



     tablet,      20:09:                               TID, PRN           [facility]

n



     disintegrat      00                                 Nausea /           



     ing                                          Vomiting,           



                                                  Dissolve           



                                                  tab under           



                                                  tongue, 0           



                                                  Refill(s)           

 

     ondansetron      2022-0      Yes                      4 mg = 1           [facility]

[facility]



     4 mg oral      6-29                               tab, PO,           l



     tablet,      20:09:                               TID, PRN           [facility]

n



     disintegrat      00                                 Nausea /           



     ing                                          Vomiting,           



                                                  Dissolve           



                                                  tab under           



                                                  tongue, 0           



                                                  Refill(s)           

 

     ondansetron      2-0      Yes                      4 mg = 1           [facility]

[facility]



     4 mg oral      6-29                               tab, PO,           l



     tablet,      20:09:                               TID, PRN           [facility]

n



     disintegrat      00                                 Nausea /           



     ing                                          Vomiting,           



                                                  Dissolve           



                                                  tab under           



                                                  tongue, 0           



                                                  Refill(s)           

 

     ondansetron      2-0      Yes                      4 mg = 1           [facility]

[facility]



     4 mg oral      6-29                               tab, PO,           l



     tablet,      20:09:                               TID, PRN           [facility]

n



     disintegrat      00                                 Nausea /           



     ing                                          Vomiting,           



                                                  Dissolve           



                                                  tab under           



                                                  tongue, 0           



                                                  Refill(s)           

 

     Nitazoxanid      2-0      Yes                      500 mg = 1           

[facility]



     e 500 mg      6-29                               tab, PO,           l



     oral tablet      20:08:                               Q12H, 0           [facility]

[facility]



               00                                 Refill(s)           

 

     Nitazoxanid      2-0      Yes                      500 mg = 1           

[facility]



     e 500 mg      6-29                               tab, PO,           l



     oral tablet      20:08:                               Q12H, 0           [facility]

[facility]



               00                                 Refill(s)           

 

     Nitazoxanid      2-0      Yes                      500 mg = 1           

[facility]



     e 500 mg      6-29                               tab, PO,           l



     oral tablet      20:08:                               Q12H, 0           [facility]

[facility]



               00                                 Refill(s)           

 

     Nitazoxanid      2-0      Yes                      500 mg = 1           

[facility]



     e 500 mg      6-29                               tab, PO,           l



     oral tablet      20:08:                               Q12H, 0           [facility]

[facility]



               00                                 Refill(s)           

 

     metoprolol      2022-0      Yes                      25 mg = 1           [facility]

[facility]



     tartrate 25      6-29                               tab, PO,           l



     mg oral      20:07:                               BID, 0           [place]



     tablet      00                                 Refill(s)           

 

     metoprolol      2022-0      Yes                      25 mg = 1           [facility]

[facility]



     tartrate 25      6-29                               tab, PO,           l



     mg oral      20:07:                               BID, 0           [facility]



     tablet      00                                 Refill(s)           

 

     metoprolol      -0      Yes                      25 mg = 1           [facility]

[facility]



     tartrate 25      6-29                               tab, PO,           l



     mg oral      20:07:                               BID, 0           [facility]



     tablet      00                                 Refill(s)           

 

     metoprolol      -0      Yes                      25 mg = 1           [facility]

[facility]



     tartrate 25      6-29                               tab, PO,           l



     mg oral      20:07:                               BID, 0           [place]



     tablet      00                                 Refill(s)           

 

     methocarbam      2-0      Yes                      250 mg =           [facility]

[facility]



     ol 500 mg      6-29                               0.5 tab,           l



     oral tablet      20:05:                               PO, TID, 0           

[facility]



               00                                 Refill(s)           

 

     methocarbam      2-0      Yes                      250 mg =           [facility]

[facility]



     ol 500 mg      6-29                               0.5 tab,           l



     oral tablet      20:05:                               PO, TID, 0           

[facility]



               00                                 Refill(s)           

 

     methocarbam      -0      Yes                      250 mg =           [facility]

[facility]



     ol 500 mg      6-29                               0.5 tab,           l



     oral tablet      20:05:                               PO, TID, 0           

[place]



               00                                 Refill(s)           

 

     methocarbam      -0      Yes                      250 mg =           [facility]

[facility]



     ol 500 mg      6-29                               0.5 tab,           l



     oral tablet      20:05:                               PO, TID, 0           

[place]



               00                                 Refill(s)           

 

     Alphagan P      -0      Yes                      1 drp,           [facility]

[facility]



     0.1%      6-                               OPTH, Q12H           l



     ophthalmic      20:04:                                              [facility]



     solution      00                                                

 

     folic acid      -0      Yes                      1 mg, PO,           [facility]

[facility]



               6-29                               Daily, 0           l



               20:04:                               Refill(s)           [place]



               00                                                

 

     Alphagan P      -0      Yes                      1 drp,           [facility]

[facility]



     0.1%      -                               OPTH, Q12H           l



     ophthalmic      20:04:                                              [place]



     solution      00                                                

 

     folic acid      -0      Yes                      1 mg, PO,           [facility]

[facility]



               6-29                               Daily, 0           l



               20:04:                               Refill(s)           [facility]



               00                                                

 

     Alphagan P      2-0      Yes                      1 drp,           [facility]

[facility]



     0.1%      6-29                               OPTH, Q12H           l



     ophthalmic      20:04:                                              [place]



     solution      00                                                

 

     folic acid      -0      Yes                      1 mg, PO,           [facility]

[facility]



               6-29                               Daily, 0           l



               20:04:                               Refill(s)           [facility]



               00                                                

 

     Alphagan P            Yes                      1 drp,           [facility]

[facility]



     0.1%                                     OPTH, Q12H           l



     ophthalmic      20:04:                                              [facility]



     solution      00                                                

 

     folic acid            Yes                      1 mg, PO,           [facility]

[facility]



                                              Daily, 0           l



               20:04:                               Refill(s)           [facility]



               00                                                

 

     Oxygen            Yes                      1 btl,           [facility]



               -                               MISC,           l



               20:03:                               Daily, 2           [place]



               00                                 liters, 0           



                                                  Refill(s)           

 

     Oxygen            Yes                      1 btl,           [facility]



               -                               MISC,           l



               20:03:                               Daily, 2           [facility]



               00                                 liters, 0           



                                                  Refill(s)           

 

     Oxygen            Yes                      1 btl,           [facility]



               -                               MISC,           l



               20:03:                               Daily, 2           [place]



               00                                 liters, 0           



                                                  Refill(s)           

 

     Oxygen            Yes                      1 btl,           [facility]



               -                               MISC,           l



               20:03:                               Daily, 2           [place]



               00                                 liters, 0           



                                                  Refill(s)           

 

     Eliquis 2.5            Yes                      2.5 mg,           [facility]

[facility]



     mg oral      6-29                               PO, Q12H,           l



     tablet      20:02:                               tab, 0           [place]



               00                                 Refill(s)           

 

     Eliquis 2.5            Yes                      2.5 mg,           [facility]

[facility]



     mg oral      6-29                               PO, Q12H,           l



     tablet      20:02:                               tab, 0           [place]



               00                                 Refill(s)           

 

     Eliquis 2.5            Yes                      2.5 mg,           [facility]

[facility]



     mg oral      6-29                               PO, Q12H,           l



     tablet      20:02:                               tab, 0           [facility]



               00                                 Refill(s)           

 

     Eliquis 2.5            Yes                      2.5 mg,           [facility]

[facility]



     mg oral      6-29                               PO, Q12H,           l



     tablet      20:02:                               tab, 0           [facility]



               00                                 Refill(s)           

 

     doxycycline      2022- No             200mg Q.5D Take 200           

[facility]



     (VIBRAMYCIN      6-26 06-25                          mg by           st



     ) 100 MG      13:07: 00:00                          mouth 2           [facility]

ta



     capsule      01   :00                           (two)           l



                                                  times a           



                                                  day. Take           



                                                  200mg (x2           



                                                  100mg           



                                                  capsules).           



                                                  Per            



                                                  patient           



                                                  started           



                                                  taking           



                                                  2022           

 

     glucosam/ch      2022- No             1{tbl} Q.5D Take 1           [facility]

[facility]



     on-msm1/C/m                                tablet by           st



     ang/inaw      13:07: 00:00                          mouth 2           [facility]

ta



     (OSTEO      01   :00                           (two)           l



     BI-FLEX                                         times a           



     TRIPLE                                         day.           



     STRENGTH                                                        



     ORAL)                                                        

 

     NON       2022- No             1{capsu QD   Take 1           [facility]



     FORMULARY                      le}       capsule by           st



               13:07: 00:00                          mouth           [facility]



               01   :00                           nightly.           l



                                                  Patient           



                                                  takes           



                                                  young           



                                                  living           



                                                  probiotic           



                                                  with 17           



                                                  billion           



                                                  active           



                                                  cultures           

 

     folic acid      2022- No             1mg  QD   Take 1           [facility]

[facility]



     (FOLVITE) 1                                tablet (1           st



     MG tablet      00:00: 04:59                          mg total)           [facility]

[facility]



               00   :00                           by mouth           l



                                                  daily for           



                                                  30 days.           

 

     methocarbam       No             250mg [id number] Take 0.5     

      [facility]



     oL                             [id number] tablets           st



     (ROBAXIN)      00:00: 04:59                     3D   (250 mg           [facility]

[facility]



     500 MG      00   :00                           total) by           l



     tablet                                         mouth 3           



                                                  (three)           



                                                  times a           



                                                  day for 7           



                                                  days.           

 

     nitazoxanid      2022- No             500mg Q.5D Take 1           [facility]

[facility]



     e (ALINIA)                                tablet           st



     500 MG      00:00: 04:59                          (500 mg           [facility]



     tablet      00   :00                           total) by           l



                                                  mouth 2           



                                                  (two)           



                                                  times a           



                                                  day for 7           



                                                  days.           

 

     oxyCODONE       No        62760 5mg  Q4H  Take 1           [facility]

[facility]



     (ROXICODONE                                tablet (5           st



     ) 5 MG      00:00: 04:59                          mg total)           [facility]

ta



     immediate      00   :00                           by mouth           l



     release                                         every 4           



     tablet                                         (four)           



                                                  hours as           



                                                  needed for           



                                                  severe           



                                                  pain for           



                                                  up to 20           



                                                  doses           



                                                  .acute           



                                                  pain. Max           



                                                  Daily           



                                                  Amount: 30           



                                                  mg             

 

     tizanidine            Yes                      4 mg = 1           [facility]

[facility]



     4 mg oral      5-16                               tab, PO,           l



     tablet      18:09:                               Bedtime,           [facility]



               00                                 PRN AS           



                                                  NEEDED FOR           



                                                  MUSCLE           



                                                  SPASM, #           



                                                  15 tab, 0           



                                                  Refill(s),           



                                                  Pharmacy:           



                                                  [facility]           



                                                  [facility]           



                                                  #[id number],           



                                                  165.1, cm,           



                                                  12/27/21           



                                                  15:23:00           



                                                  CST,           



                                                  Height,           



                                                  107.301,           



                                                  kg,            



                                                  21           



                                                  15:23:00           



                                                  CST,           



                                                  Weight           

 

     tizanidine      2-0      Yes                      4 mg = 1           [facility]

[facility]



     4 mg oral      5-16                               tab, PO,           l



     tablet      18:09:                               Bedtime,           [facility]



               00                                 PRN AS           



                                                  NEEDED FOR           



                                                  MUSCLE           



                                                  SPASM, #           



                                                  15 tab, 0           



                                                  Refill(s),           



                                                  Pharmacy:           



                                                  [facility]           



                                                  [facility]           



                                                  #[id number],           



                                                  165.1, cm,           



                                                  21           



                                                  15:23:00           



                                                  CST,           



                                                  Height,           



                                                  107.301,           



                                                  kg,            



                                                  21           



                                                  15:23:00           



                                                  CST,           



                                                  Weight           

 

     tizanidine      -0      Yes                      4 mg = 1           [facility]

[facility]



     4 mg oral      5-16                               tab, PO,           l



     tablet      18:09:                               Bedtime,           [facility]



               00                                 PRN AS           



                                                  NEEDED FOR           



                                                  MUSCLE           



                                                  SPASM, #           



                                                  15 tab, 0           



                                                  Refill(s),           



                                                  [facility]           



                                                  [facility]           



                                                  [id number],           



                                                  165.1, cm,           



                                                  21           



                                                  15:23:00           



                                                  CST,           



                                                  Height,           



                                                  107.301,           



                                                  kg,            



                                                  21           



                                                  15:23:00           



                                                  CST,           



                                                  Weight           

 

     tizanidine      -0      Yes                      4 mg = 1           [facility]

[facility]



     4 mg oral      5-16                               tab, PO,           l



     tablet      18:09:                               Bedtime,           [place]



               00                                 PRN AS           



                                                  NEEDED FOR           



                                                  MUSCLE           



                                                  SPASM, #           



                                                  15 tab, 0           



                                                  Refill(s),           



                                                  Pharmacy:           



                                                  [facility]           



                                                  [facility]           



                                                  #[id number],           



                                                  165.1, cm,           



                                                  21           



                                                  15:23:00           



                                                  CST,           



                                                  Height,           



                                                  107.301,           



                                                  kg,            



                                                  21           



                                                  15:23:00           



                                                  CST,           



                                                  Weight           

 

     nitrofurant      2022- No             100mg Q.5D Take 1           [facility]

[facility]



     oin,      3- 03-15                          capsule           st



     macrocrysta      00:00: 04:59                          (100 mg           [facility]

[facility]



     l-monohydra      00   :00                           total) by           l



     te,                                          mouth 2           



     (MACROBID)                                         (two)           



     100 MG                                         times a           



     capsule                                         day for 3           



                                                  days.           

 

     lidocaine      2022- No             1{patch Q24H Place 1           [facility]

[facility]



     (LIDODERM)      3-09 04-09                }         patch on           st



     5 %       00:00: 04:59                          the skin           [facility]



               00   :00                           in the           l



                                                  morning           



                                                  for 30           



                                                  days.           



                                                  Remove &           



                                                  Discard           



                                                  patch           



                                                  within 12           



                                                  hours or           



                                                  as             



                                                  directed           



                                                  by MD.           

 

     traMADoL      -0 - No        30123 50mg [id number] Take 1          

 [facility]



     (ULTRAM) 50      3-09 03-20                     [id number] tablet (50      

     st



     mg tablet      00:00: 04:59                     3D   mg total)           [facility]

[facility]



               00   :00                           by mouth           l



                                                  as needed           



                                                  in the           



                                                  morning           



                                                  and 1           



                                                  tablet (50           



                                                  mg total)           



                                                  as needed           



                                                  at noon           



                                                  and 1           



                                                  tablet (50           



                                                  mg total)           



                                                  as needed           



                                                  in the           



                                                  evening           



                                                  for            



                                                  moderate           



                                                  pain. Do           



                                                  all this           



                                                  for up to           



                                                  10             



                                                  days.acute           



                                                  pain.           

 

     metoprolol      -0      Yes            25mg Q.5D Take 25 mg           M

ethodi



     tartrate      2-21                               by mouth 2           st



     (LOPRESSOR)      00:00:                               (two)           [facility]

ta



     25 mg      00                                 times a           l



     tablet                                         day.           

 

     Ondansetron      -0      Yes                      4 mg = 1           Me

[facility]



     4 MG Oral      1-07                               tab, PO,           l



     Tablet      22:01:                               BID, X 5           [facility]



     [Zofran]                                       day, # 10           



                                                  tab, 0           



                                                  Refill(s),           



                                                  Pharmacy:           



                                                  [facility]           



                                                  #[id number],           



                                                  165.1, cm,           



                                                  21           



                                                  15:23:00           



                                                  CST,           



                                                  Height,           



                                                  107.301,           



                                                  kg,            



                                                  21           



                                                  15:23:00           



                                                  CST,           



                                                  Weight           

 

     Ondansetron      -0      Yes                      4 mg = 1           Me

[facility]



     4 MG Oral      1-07                               tab, PO,           l



     Tablet      22:01:                               BID, X 5           [facility]



     [Zofran]                                       day, # 10           



                                                  tab, 0           



                                                  Refill(s),           



                                                  Pharmacy:           



                                                  [facility]           



                                                  #[id number],           



                                                  165.1, cm,           



                                                  21           



                                                  15:23:00           



                                                  CST,           



                                                  Height,           



                                                  107.301,           



                                                  kg,            



                                                  21           



                                                  15:23:00           



                                                  CST,           



                                                  Weight           

 

     Ondansetron      2-0      Yes                      4 mg = 1           Me

[facility]



     4 MG Oral      1-07                               tab, PO,           l



     Tablet      22:01:                               BID, X 5           [place]



     [Zofran]                                       day, # 10           



                                                  tab, 0           



                                                  Refill(s),           



                                                  Pharmacy:           



                                                  [facility]           



                                                  #[id number],           



                                                  165.1, cm,           



                                                  21           



                                                  15:23:00           



                                                  CST,           



                                                  Height,           



                                                  107.301,           



                                                  kg,            



                                                  21           



                                                  15:23:00           



                                                  CST,           



                                                  Weight           

 

     Ondansetron            Yes                      4 mg = 1           Me

[facility]



     4 MG Oral      1-07                               tab, PO,           l



     Tablet      22:01:                               BID, X 5           [place]



     [Zofran]      00                                 day, # 10           



                                                  tab, 0           



                                                  Refill(s),           



                                                  Pharmacy:           



                                                  [facility]           



                                                  [facility]           



                                                  #[id number],           



                                                  165.1, cm,           



                                                  21           



                                                  15:23:00           



                                                  CST,           



                                                  Height,           



                                                  107.301,           



                                                  kg,            



                                                  21           



                                                  15:23:00           



                                                  CST,           



                                                  Weight           

 

     atorvastati      2021      Yes                      10 mg = 1           M

[facility]



     n 10 mg      2-27                               tab, PO,           l



     oral tablet      21:45:                               Bedtime, #           

[place]



               00                                 90 tab, 0           



                                                  Refill(s),           



                                                  Pharmacy:           



                                                  [facility]           



                                                  [facility]           



                                                  #[id number],           



                                                  165.1, cm,           



                                                  21           



                                                  15:23:00           



                                                  CST,           



                                                  Height,           



                                                  107.301,           



                                                  kg,            



                                                  21           



                                                  15:23:00           



                                                  CST,           



                                                  Weight           

 

     atorvastati      2021      Yes                      10 mg = 1           M

[facility]



     n 10 mg      2-27                               tab, PO,           l



     oral tablet      21:45:                               Bedtime, #           

[place]



               00                                 90 tab, 0           



                                                  Refill(s),           



                                                  Pharmacy:           



                                                  [facility]           



                                                  #[id number],           



                                                  165.1, cm,           



                                                  21           



                                                  15:23:00           



                                                  CST,           



                                                  Height,           



                                                  107.301,           



                                                  kg,            



                                                  21           



                                                  15:23:00           



                                                  CST,           



                                                  Weight           

 

     atorvastati      2021      Yes                      10 mg = 1           M

[facility]



     n 10 mg      2-27                               tab, PO,           l



     oral tablet      21:45:                               Bedtime, #           

[place]



               00                                 90 tab, 0           



                                                  Refill(s),           



                                                  Pharmacy:           



                                                  [facility]           



                                                  [facility]           



                                                  #[id number],           



                                                  165.1, cm,           



                                                  21           



                                                  15:23:00           



                                                  CST,           



                                                  Height,           



                                                  107.301,           



                                                  kg,            



                                                  21           



                                                  15:23:00           



                                                  CST,           



                                                  Weight           

 

     atorvastati      2021      Yes                      10 mg = 1           M

[facility]



     n 10 mg      2-27                               tab, PO,           l



     oral tablet      21:45:                               Bedtime, #           

[place]



               00                                 90 tab, 0           



                                                  Refill(s),           



                                                  Pharmacy:           



                                                  [facility]           



                                                  #[id number],           



                                                  165.1, cm,           



                                                  21           



                                                  15:23:00           



                                                  CST,           



                                                  Height,           



                                                  107.301,           



                                                  kg,            



                                                  21           



                                                  15:23:00           



                                                  CST,           



                                                  Weight           

 

     tizanidine      2021      Yes                      4 mg = 1           [facility]

[facility]



     4 mg oral      2-27                               tab, PO,           l



     tablet      21:40:                               Bedtime,           [place]



               00                                 PRN for           



                                                  muscle           



                                                  spasm, #           



                                                  30 tab, 0           



                                                  Refill(s),           



                                                  Pharmacy:           



                                                  [facility]           



                                                  #[id number],           



                                                  165.1, cm,           



                                                  21           



                                                  15:23:00           



                                                  CST,           



                                                  Height,           



                                                  107.301,           



                                                  kg,            



                                                  21           



                                                  15:23:00           



                                                  CST,           



                                                  Weight           

 

     Acetaminoph      2021      Yes                      1 tab, PO,           

[facility]



     en 325 MG /      2-27                               Q12H, PRN           l



     tramadol      21:40:                               for pain,           [facility]

[facility]



     hydrochlori      00                                 X 15 day,           



     de 37.5 MG                                         # 30 tab,           



     Oral Tablet                                         0              



     [Ultracet]                                         Refill(s),           



                                                  Pharmacy:           



                                                  [facility]           



                                                  #[id number],           



                                                  165.1, cm,           



                                                  21           



                                                  15:23:00           



                                                  CST,           



                                                  Height,           



                                                  107.301,           



                                                  kg,            



                                                  21           



                                                  15:23:00           



                                                  CST,           



                                                  Weight           

 

     tizanidine      2021      Yes                      4 mg = 1           [facility]

[facility]



     4 mg oral      2-27                               tab, PO,           l



     tablet      21:40:                               Bedtime,           [place]



               00                                 PRN for           



                                                  muscle           



                                                  spasm, #           



                                                  30 tab, 0           



                                                  Refill(s),           



                                                  Pharmacy:           



                                                  [facility]           



                                                  #[id number],           



                                                  165.1, cm,           



                                                  21           



                                                  15:23:00           



                                                  CST,           



                                                  Height,           



                                                  107.301,           



                                                  kg,            



                                                  21           



                                                  15:23:00           



                                                  CST,           



                                                  Weight           

 

     Acetaminoph      2021      Yes                      1 tab, PO,           

[facility]



     en 325 MG /      2-27                               Q12H, PRN           l



     tramadol      21:40:                               for pain,           [facility]

[facility]



     hydrochlori      00                                 X 15 day,           



     de 37.5 MG                                         # 30 tab,           



     Oral Tablet                                         0              



     [Ultracet]                                         Refill(s),           



                                                  Pharmacy:           



                                                  [facility]           



                                                  #[id number],           



                                                  165.1, cm,           



                                                  21           



                                                  15:23:00           



                                                  CST,           



                                                  Height,           



                                                  107.301,           



                                                  kg,            



                                                  21           



                                                  15:23:00           



                                                  CST,           



                                                  Weight           

 

     tizanidine      2021      Yes                      4 mg = 1           [facility]

[facility]



     4 mg oral      2-27                               tab, PO,           l



     tablet      21:40:                               Bedtime,           [place]



               00                                 PRN for           



                                                  muscle           



                                                  spasm, #           



                                                  30 tab, 0           



                                                  Refill(s),           



                                                  Pharmacy:           



                                                  [facility]           



                                                  #[id number],           



                                                  165.1, cm,           



                                                  21           



                                                  15:23:00           



                                                  CST,           



                                                  Height,           



                                                  107.301,           



                                                  kg,            



                                                  21           



                                                  15:23:00           



                                                  CST,           



                                                  Weight           

 

     Acetaminoph      2021      Yes                      1 tab, PO,           

[facility]



     en 325 MG /      2-27                               Q12H, PRN           l



     tramadol      21:40:                               for pain,           [facility]

[facility]



     hydrochlori      00                                 X 15 day,           



     de 37.5 MG                                         # 30 tab,           



     Oral Tablet                                         0              



     [Ultracet]                                         Refill(s),           



                                                  Pharmacy:           



                                                  [facility]           



                                                  #[id number],           



                                                  165.1, cm,           



                                                  21           



                                                  15:23:00           



                                                  CST,           



                                                  Height,           



                                                  107.301,           



                                                  kg,            



                                                  21           



                                                  15:23:00           



                                                  CST,           



                                                  Weight           

 

     tizanidine      2021      Yes                      4 mg = 1           [facility]

[facility]



     4 mg oral      2-27                               tab, PO,           l



     tablet      21:40:                               Bedtime,           [place]



                                                PRN for           



                                                  muscle           



                                                  spasm, #           



                                                  30 tab, 0           



                                                  Refill(s),           



                                                  Pharmacy:           



                                                  [facility]           



                                                  #[id number],           



                                                  165.1, cm,           



                                                  21           



                                                  15:23:00           



                                                  CST,           



                                                  Height,           



                                                  107.301,           



                                                  kg,            



                                                  21           



                                                  15:23:00           



                                                  CST,           



                                                  Weight           

 

     Acetaminoph      2021      Yes                      1 tab, PO,           

[facility]



     en 325 MG /      2-27                               Q12H, PRN           l



     tramadol      21:40:                               for pain,           [facility]

[facility]



     hydrochlori      00                                 X 15 day,           



     de 37.5 MG                                         # 30 tab,           



     Oral Tablet                                         0              



     [Ultracet]                                         Refill(s),           



                                                  Pharmacy:           



                                                  [facility]           



                                                  #[id number],           



                                                  165.1, cm,           



                                                  21           



                                                  15:23:00           



                                                  CST,           



                                                  Height,           



                                                  107.301,           



                                                  kg,            



                                                  21           



                                                  15:23:00           



                                                  CST,           



                                                  Weight           

 

     sevelamer      2021      Yes                      1,600 mg =           Me

[facility]



     800 mg oral      2-27                               2 tab, PO,           l



     tablet      21:21:                               TID-Meals,           [facility]

nn



               00                                 # 180 tab,           



                                                  0              



                                                  Refill(s)           

 

     sevelamer      2021      Yes                      1,600 mg =           Me

[facility]



     800 mg oral      2-27                               2 tab, PO,           l



     tablet      21:21:                               TID-Meals,           [facility]

nn



               00                                 # 180 tab,           



                                                  0              



                                                  Refill(s)           

 

     sevelamer      2021      Yes                      1,600 mg =           Me

[facility]



     800 mg oral      2-27                               2 tab, PO,           l



     tablet      21:21:                               TID-Meals,           [facility]

nn



               00                                 # 180 tab,           



                                                  0              



                                                  Refill(s)           

 

     sevelamer      2021      Yes                      1,600 mg =           Me

[facility]



     800 mg oral      2-27                               2 tab, PO,           l



     tablet      21:21:                               TID-Meals,           [facility]

nn



               00                                 # 180 tab,           



                                                  0              



                                                  Refill(s)           

 

     Mupirocin      2021      Yes                      1 appl,           [facility]

ia



     0.02 MG/MG      1-22                               TOP, TID,           l



     Topical      23:21:                               X 5 day, #           [facility]

d[facility]



     Ointment      00                                 22 gm, 0           



                                                  Refill(s),           



                                                  Pharmacy:           



                                                  [facility]           



                                                  [facility]           



                                                  #[id number],           



                                                  165.1, cm,           



                                                  21           



                                                  14:08:00           



                                                  CST,           



                                                  Height,           



                                                  136.42,           



                                                  kg,            



                                                  21           



                                                  14:08:00           



                                                  CST,           



                                                  Weight           

 

     Mupirocin      2021      Yes                      1 appl,           [facility]

ia



     0.02 MG/MG      1-22                               TOP, TID,           l



     Topical      23:21:                               X 5 day, #           [facility]

d[facility]



     Ointment      00                                 22 gm, 0           



                                                  Refill(s),           



                                                  Pharmacy:           



                                                  [facility]           



                                                  #[id number],           



                                                  165.1, cm,           



                                                  21           



                                                  14:08:00           



                                                  CST,           



                                                  Height,           



                                                  136.42,           



                                                  kg,            



                                                  21           



                                                  14:08:00           



                                                  CST,           



                                                  Weight           

 

     Mupirocin      2021      Yes                      1 appl,           [facility]

ia



     0.02 MG/MG      1-22                               TOP, TID,           l



     Topical      23:21:                               X 5 day, #           [facility]

daryl



     Ointment      00                                 22 gm, 0           



                                                  Refill(s),           



                                                  Pharmacy:           



                                                  [facility]           



                                                  #[id number],           



                                                  165.1, cm,           



                                                  21           



                                                  14:08:00           



                                                  CST,           



                                                  Height,           



                                                  136.42,           



                                                  kg,            



                                                  21           



                                                  14:08:00           



                                                  CST,           



                                                  Weight           

 

     Mupirocin      2021      Yes                      1 appl,           [facility]

ia



     0.02 MG/MG      1-22                               TOP, TID,           l



     Topical      23:21:                               X 5 day, #           [facility]

d[facility]



     Ointment      00                                 22 gm, 0           



                                                  Refill(s),           



                                                  Pharmacy:           



                                                  [facility]           



                                                  [facility] ST[facility]           



                                                  #[id number],           



                                                  165.1, cm,           



                                                  21           



                                                  14:08:00           



                                                  CST,           



                                                  Height,           



                                                  136.42,           



                                                  kg,            



                                                  21           



                                                  14:08:00           



                                                  CST,           



                                                  Weight           

 

     bumetanide      2021      Yes                      2 mg = 1           [facility]

[facility]



     2 mg oral      1-19                               tab, PO,           l



     tablet      21:11:                               Daily, #           [facility]



               00                                 30 tab, 0           



                                                  Refill(s)           

 

     bumetanide      2021      Yes                      2 mg = 1           [facility]

[facility]



     2 mg oral      1-19                               tab, PO,           l



     tablet      21:11:                               Daily, #           [facility]



               00                                 30 tab, 0           



                                                  Refill(s)           

 

     bumetanide      2021      Yes                      2 mg = 1           [facility]

[facility]



     2 mg oral      1-19                               tab, PO,           l



     tablet      21:11:                               Daily, #           [place]



               00                                 30 tab, 0           



                                                  Refill(s)           

 

     bumetanide      2021      Yes                      2 mg = 1           [facility]

[facility]



     2 mg oral      1-19                               tab, PO,           l



     tablet      21:11:                               Daily, #           [facility]



               00                                 30 tab, 0           



                                                  Refill(s)           

 

     allopurinol      2021      Yes                      100 mg = 1           

[facility]



     100 mg oral      1-19                               tab, PO,           l



     tablet      21:10:                               BID, # 60           [facility]

n



               00                                 tab, 0           



                                                  Refill(s)           

 

     gabapentin      2021      Yes                      200 mg = 2           [facility]

[facility]



     100 MG Oral      1-19                               cap, PO,           l



     Capsule      21:10:                               Bedtime, 0           [facility]

[facility]



               00                                 Refill(s)           

 

     allopurinol      2021      Yes                      100 mg = 1           

[facility]



     100 mg oral      1-19                               tab, PO,           l



     tablet      21:10:                               BID, # 60           [facility]

n



               00                                 tab, 0           



                                                  Refill(s)           

 

     gabapentin      2021      Yes                      200 mg = 2           [facility]

[facility]



     100 MG Oral      1-19                               cap, PO,           l



     Capsule      21:10:                               Bedtime, 0           [facility]

[facility]



               00                                 Refill(s)           

 

     allopurinol      2021      Yes                      100 mg = 1           

[facility]



     100 mg oral      1-19                               tab, PO,           l



     tablet      21:10:                               BID, # 60           [facility]

n



               00                                 tab, 0           



                                                  Refill(s)           

 

     gabapentin      2021      Yes                      200 mg = 2           [facility]

[facility]



     100 MG Oral      1-19                               cap, PO,           l



     Capsule      21:10:                               Bedtime, 0           [facility]

[facility]



               00                                 Refill(s)           

 

     allopurinol      2021      Yes                      100 mg = 1           

[facility]



     100 mg oral      -19                               tab, PO,           l



     tablet      21:10:                               BID, # 60           [facility]

n



               00                                 tab, 0           



                                                  Refill(s)           

 

     gabapentin      2021      Yes                      200 mg = 2           [facility]

[facility]



     100 MG Oral      -19                               cap, PO,           l



     Capsule      21:10:                               Bedtime, 0           [facility]

[facility]



               00                                 Refill(s)           

 

     midodrine      2021      Yes            10mg [id number] Take 10 mg       

    [facility]



     (PROAMATINE      -13                          [id number] by mouth 3       

    st



     ) 10 MG      00:00:                          3D   (three)           [facility]



     tablet      00                                 times a           l



                                                  day.           

 

     BUMETanide      2021      Yes            2mg  QD   Take 2 mg           Me

[facility]



     (BUMEX) 2      -12                               by mouth           st



     MG tablet      00:00:                               every           [facility]



               00                                 morning.           l

 

     digOXIN      2021      Yes            125ug [id number] Take 125          

 [facility]



     (LANOXIN)      -12                          [id number] mcg by           st



     125 mcg      00:00:                          3W   mouth 3           [facility]



     (0.125 mg)      00                                 (three)           l



     tablet                                         times a           



                                                  week. On           



                                                  dialysis           



                                                  daysTuesda           



                                                  y,             



                                                  Thursday,           



                                                  Saturday           

 

     carvediloL      2021- No                                      [facility]

i



     (COREG)      1 02-28                                         st



     3.125 MG      00:00: 00:00                                         [facility]



     tablet      00   :00                                          l

 

     ipratropium      2021- No        [id number] .5mg Q.25D Take 2.5      

     [facility]



     (ATROVENT)      0-05 02-28                          mL (0.5 mg           st



     0.02 %      00:00: 00:00                          total) [facility]           [facility]

[facility]



     nebulizer      00   :00                           nebulizati           l



     solution                                         on 4           



                                                  (four)           



                                                  times a           



                                                  day.           

 

     QUEtiapine            Yes                      1 tablet,           [facility]

[facility]



     50 mg oral      3-30                               once a           l



     tablet      13:55:                               day, 0           [place]



               00                                 Refill(s)           

 

     torsemide            Yes                      1 tablet,           [facility]

[facility]



     20 mg oral      3-30                               twice a           l



     tablet      13:55:                               day, 0           [place]



               00                                 Refill(s)           

 

     QUEtiapine            Yes                      1 tablet,           [facility]

[facility]



     50 mg oral      3-30                               once a           l



     tablet      13:55:                               day, 0           [place]



               00                                 Refill(s)           

 

     torsemide      2021-0      Yes                      1 tablet,           [facility]

[facility]



     20 mg oral      3-30                               twice a           l



     tablet      13:55:                               day, 0           [place]



               00                                 Refill(s)           

 

     QUEtiapine      -0      Yes                      1 tablet,           [facility]

[facility]



     50 mg oral      3-30                               once a           l



     tablet      13:55:                               day, 0           [place]



               00                                 Refill(s)           

 

     torsemide      -0      Yes                      1 tablet,           [facility]

[facility]



     20 mg oral      3-30                               twice a           l



     tablet      13:55:                               day, 0           [place]



               00                                 Refill(s)           

 

     QUEtiapine      -0      Yes                      1 tablet,           Me

[facility]



     50 mg oral      3-30                               once a           l



     tablet      13:55:                               day, 0           [place]



               00                                 Refill(s)           

 

     torsemide      -0      Yes                      1 tablet,           [facility]

[facility]



     20 mg oral      3-30                               twice a           l



     tablet      13:55:                               day, 0           [place]



               00                                 Refill(s)           

 

     potassium      -0      Yes                      1 tablet,           [facility]

[facility]



     chloride 20      3-30                               once a           l



     mEq oral      13:54:                               day, 0           [facility]



     tablet,      00                                 Refill(s)           



     extended                                                        



     release                                                        



     (KCL)                                                        

 

     potassium      -0      Yes                      1 tablet,           [facility]

[facility]



     chloride 20      3-30                               once a           l



     mEq oral      13:54:                               day, 0           [facility]



     tablet,      00                                 Refill(s)           



     extended                                                        



     release                                                        



     (KCL)                                                        

 

     potassium      -0      Yes                      1 tablet,           [facility]

[facility]



     chloride 20      3-30                               once a           l



     mEq oral      13:54:                               day, 0           [facility]



     tablet,      00                                 Refill(s)           



     extended                                                        



     release                                                        



     (KCL)                                                        

 

     potassium      -0      Yes                      1 tablet,           [facility]

[facility]



     chloride 20      3-30                               once a           l



     mEq oral      13:54:                               day, 0           [facility]



     tablet,      00                                 Refill(s)           



     extended                                                        



     release                                                        



     (KCL)                                                        

 

     allopurinol      -0      Yes                      1/2            [facility]

a



     300 mg oral      3-30                               tablet,           l



     tablet      13:53:                               once a           [facility]



               00                                 day, 0           



                                                  Refill(s)           

 

     carvedilol      -0      Yes                      1 tablet,           Me

[facility]



     3.125 mg      3-30                               twice a           l



     oral tablet      13:53:                               day, 0           [facility]

[facility]



               00                                 Refill(s)           

 

     Digoxin      -0      Yes                      1 tablet,           [facility]

[facility]



     0.125 MG      3-30                               once a           l



     Oral Tablet      13:53:                               day, 0           [facility]

[facility]



               00                                 Refill(s)           

 

     DULoxetine      0      Yes                      1 capsule,           [facility]

[facility]



     60 mg oral      3-30                               once a           l



     delayed      13:53:                               day, 0           [facility]



     release      00                                 Refill(s)           



     capsule                                                        

 

     apixaban 5            Yes                      1 tablet,           [facility]

[facility]



     MG Oral      3-30                               twice a           l



     Tablet      13:53:                               day, 0           [facility]



     [Eliquis]      00                                 Refill(s)           

 

     gabapentin            Yes                      1 capsule,           [facility]

[facility]



     300 MG Oral      3-30                               twice a           l



     Capsule      13:53:                               day, 0           [place]



               00                                 Refill(s)           

 

     metoprolol            Yes                      1 tablet,           [facility]

[facility]



     tartrate 50      3-30                               Twice a           l



     mg oral      13:53:                               day, 0           [place]



     tablet      00                                 Refill(s)           

 

     midodrine 5            Yes                      1 tablet,           [facility]

[facility]



     mg oral      3-30                               twice a           l



     tablet      13:53:                               day, 0           [place]



               00                                 Refill(s)           

 

     allopurinol            Yes                      1/2            [facility]

a



     300 mg oral      3-30                               tablet,           l



     tablet      13:53:                               once a           [facility]



               00                                 day, 0           



                                                  Refill(s)           

 

     carvedilol            Yes                      1 tablet,           Me

[facility]



     3.125 mg      3-30                               twice a           l



     oral tablet      13:53:                               day, 0           [facility]

daryl



               00                                 Refill(s)           

 

     Digoxin            Yes                      1 tablet,           [facility]

ia



     0.125 MG      3-30                               once a           l



     Oral Tablet      13:53:                               day, 0           [facility]

d[facility]



               00                                 Refill(s)           

 

     DULoxetine            Yes                      1 capsule,           M

[facility]



     60 mg oral      3-30                               once a           l



     delayed      13:53:                               day, 0           [place]



     release      00                                 Refill(s)           



     capsule                                                        

 

     apixaban 5            Yes                      1 tablet,           Me

[facility]



     MG Oral      3-30                               twice a           l



     Tablet      13:53:                               day, 0           [facility]



     [Eliquis]      00                                 Refill(s)           

 

     gabapentin            Yes                      1 capsule,           M

[facility]



     300 MG Oral      3-30                               twice a           l



     Capsule      13:53:                               day, 0           [facility]



               00                                 Refill(s)           

 

     metoprolol      0      Yes                      1 tablet,           Me

[facility]



     tartrate 50      3-30                               Twice a           l



     mg oral      13:53:                               day, 0           [place]



     tablet      00                                 Refill(s)           

 

     midodrine 5      2021-0      Yes                      1 tablet,           M

[facility]



     mg oral      3-30                               twice a           l



     tablet      13:53:                               day, 0           [facility]



               00                                 Refill(s)           

 

     allopurinol      -0      Yes                      1/2            [facility]

a



     300 mg oral      3-30                               tablet,           l



     tablet      13:53:                               once a           [place]



               00                                 day, 0           



                                                  Refill(s)           

 

     carvedilol      -0      Yes                      1 tablet,           Me

[facility]



     3.125 mg      3-30                               twice a           l



     oral tablet      13:53:                               day, 0           [facility]

d[facility]



               00                                 Refill(s)           

 

     Digoxin      -0      Yes                      1 tablet,           [facility]

ia



     0.125 MG      3-30                               once a           l



     Oral Tablet      13:53:                               day, 0           [facility]

d[facility]



               00                                 Refill(s)           

 

     DULoxetine      -0      Yes                      1 capsule,           M

[facility]



     60 mg oral      3-30                               once a           l



     delayed      13:53:                               day, 0           [facility]



     release      00                                 Refill(s)           



     capsule                                                        

 

     apixaban 5      0      Yes                      1 tablet,           Me

[facility]



     MG Oral      3-30                               twice a           l



     Tablet      13:53:                               day, 0           [place]



     [Eliquis]      00                                 Refill(s)           

 

     gabapentin      -0      Yes                      1 capsule,           M

[facility]



     300 MG Oral      3-30                               twice a           l



     Capsule      13:53:                               day, 0           [place]



               00                                 Refill(s)           

 

     metoprolol      -0      Yes                      1 tablet,           Me

[facility]



     tartrate 50      3-30                               Twice a           l



     mg oral      13:53:                               day, 0           [facility]



     tablet      00                                 Refill(s)           

 

     midodrine 5      -0      Yes                      1 tablet,           M

[facility]



     mg oral      3-30                               twice a           l



     tablet      13:53:                               day, 0           [facility]



               00                                 Refill(s)           

 

     allopurinol      -0      Yes                      1/2            [facility]

a



     300 mg oral      3-30                               tablet,           l



     tablet      13:53:                               once a           [place]



               00                                 day, 0           



                                                  Refill(s)           

 

     carvedilol      -0      Yes                      1 tablet,           Me

[facility]



     3.125 mg      3-30                               twice a           l



     oral tablet      13:53:                               day, 0           [facility]

d[facility]



               00                                 Refill(s)           

 

     Digoxin      -0      Yes                      1 tablet,           [facility]

ia



     0.125 MG      3-30                               once a           l



     Oral Tablet      13:53:                               day, 0           [facility]

d[facility]



               00                                 Refill(s)           

 

     DULoxetine      -0      Yes                      1 capsule,           M

[facility]



     60 mg oral      3-30                               once a           l



     delayed      13:53:                               day, 0           [facility]



     release      00                                 Refill(s)           



     capsule                                                        

 

     apixaban 5            Yes                      1 tablet,           Me

[facility]



     MG Oral      3-30                               twice a           l



     Tablet      13:53:                               day, 0           [facility]



     [Eliquis]      00                                 Refill(s)           

 

     gabapentin            Yes                      1 capsule,           M

[facility]



     300 MG Oral      3-30                               twice a           l



     Capsule      13:53:                               day, 0           [place]



               00                                 Refill(s)           

 

     metoprolol            Yes                      1 tablet,           Me

[facility]



     tartrate 50      3-30                               Twice a           l



     mg oral      13:53:                               day, 0           [place]



     tablet      00                                 Refill(s)           

 

     midodrine 5            Yes                      1 tablet,           M

[facility]



     mg oral      3-30                               twice a           l



     tablet      13:53:                               day, 0           [facility]



               00                                 Refill(s)           

 

     DULoxetine      2020      Yes                      60 mg = 1           Me

[facility]



     60 mg oral      1-25                               cap, PO,           l



     delayed      17:17:                               Daily, #           [facility]

n



     release      00                                 30 cap, 0           



     capsule                                         Refill(s)           

 

     Spironolact      2020      Yes                      25 mg = 1           M

[facility]



     one 25 MG      1-25                               tab, PO,           l



     Oral Tablet      17:17:                               Daily, 0           [facility]

[facility]



     [Aldactone]      00                                 Refill(s)           

 

     DULoxetine      2020      Yes                      60 mg = 1           Me

[facility]



     60 mg oral      1-25                               cap, PO,           l



     delayed      17:17:                               Daily, #           [facility]

n



     release      00                                 30 cap, 0           



     capsule                                         Refill(s)           

 

     Spironolact      2020      Yes                      25 mg = 1           M

[facility]



     one 25 MG      1-25                               tab, PO,           l



     Oral Tablet      17:17:                               Daily, 0           [facility]

[facility]



     [Aldactone]      00                                 Refill(s)           

 

     DULoxetine      2020      Yes                      60 mg = 1           Me

[facility]



     60 mg oral      1-25                               cap, PO,           l



     delayed      17:17:                               Daily, #           [facility]

n



     release      00                                 30 cap, 0           



     capsule                                         Refill(s)           

 

     Spironolact      2020      Yes                      25 mg = 1           M

[facility]



     one 25 MG      1-25                               tab, PO,           l



     Oral Tablet      17:17:                               Daily, 0           [facility]

[facility]



     [Aldactone]      00                                 Refill(s)           

 

     DULoxetine      2020      Yes                      60 mg = 1           Me

[facility]



     60 mg oral      1-25                               cap, PO,           l



     delayed      17:17:                               Daily, #           [facility]

n



     release      00                                 30 cap, 0           



     capsule                                         Refill(s)           

 

     Spironolact      2020      Yes                      25 mg = 1           M

[facility]



     one 25 MG      1-25                               tab, PO,           l



     Oral Tablet      17:17:                               Daily, 0           He

[facility]



     [Aldactone]      00                                 Refill(s)           

 

     Digoxin      2020      Yes                      0.125 mg,           [facility]

ia



     0.125 MG      1-25                               PO, Daily,           l



     Oral Tablet      17:13:                               # 30 tab,           H

[facility]



               00                                 0              



                                                  Refill(s)           

 

     Digoxin      2020      Yes                      0.125 mg,           [facility]

ia



     0.125 MG      1-25                               PO, Daily,           l



     Oral Tablet      17:13:                               # 30 tab,           H

[facility]



               00                                 0              



                                                  Refill(s)           

 

     Digoxin      2020      Yes                      0.125 mg,           [facility]

ia



     0.125 MG      1-25                               PO, Daily,           l



     Oral Tablet      17:13:                               # 30 tab,           H

[facility]



               00                                 0              



                                                  Refill(s)           

 

     Digoxin      2020      Yes                      0.125 mg,           [facility]

ia



     0.125 MG      1-25                               PO, Daily,           l



     Oral Tablet      17:13:                               # 30 tab,           H

[facility]



               00                                 0              



                                                  Refill(s)           

 

     Mupirocin      2020      Yes                      See            [facility]



     0.02 MG/MG      0-13                               Instructio           l



     Topical      15:44:                               ns, APPLY           [facility]

nn



     Ointment      00                                 EXTERNALLY           



                                                  TO THE           



                                                  AFFECTED           



                                                  AREA TWICE           



                                                  DAILY, #           



                                                  66 gm, 1           



                                                  Refill(s),           



                                                  Pharm[facility]           



                                                  [facility]           



                                                  #[id number],           



                                                  170.18,           



                                                  cm,            



                                                  20           



                                                  14:42:00           



                                                  CST,           



                                                  Height,           



                                                  164.318,           



                                                  kg,            



                                                  20           



                                                  14:42:00           



                                                  CST,           



                                                  Weight           

 

     Mupirocin      2020      Yes                      See            [facility]



     0.02 MG/MG      0-13                               Instructio           l



     Topical      15:44:                               ns, APPLY           [facility]

nn



     Ointment      00                                 EXTERNALLY           



                                                  TO THE           



                                                  AFFECTED           



                                                  AREA TWICE           



                                                  DAILY, #           



                                                  66 gm, 1           



                                                  Refill(s),           



                                                  Pharmacy:           



                                                  [facility]           



                                                  #[id number],           



                                                  170.18,           



                                                  cm,            



                                                  20           



                                                  14:42:00           



                                                  CST,           



                                                  Height,           



                                                  164.318,           



                                                  kg,            



                                                  20           



                                                  14:42:00           



                                                  CST,           



                                                  Weight           

 

     Mupirocin      2020      Yes                      See            [facility]



     0.02 MG/MG      0-13                               Instructio           l



     Topical      15:44:                               ns, APPLY           [facility]

nn



     Ointment      00                                 EXTERNALLY           



                                                  TO THE           



                                                  AFFECTED           



                                                  AREA TWICE           



                                                  DAILY, #           



                                                  66 gm, 1           



                                                  Refill(s),           



                                                  Pharmacy:           



                                                  [facility]           



                                                  [facility]           



                                                  #[id number],           



                                                  170.18,           



                                                  cm,            



                                                  20           



                                                  14:42:00           



                                                  CST,           



                                                  Height,           



                                                  164.318,           



                                                  kg,            



                                                  20           



                                                  14:42:00           



                                                  CST,           



                                                  Weight           

 

     Mupirocin      2020-1      Yes                      See            [facility]



     0.02 MG/MG      0-13                               Instructio           l



     Topical      15:44:                               ns, APPLY           [facility]

nn



     Ointment      00                                 EXTERNALLY           



                                                  TO THE           



                                                  AFFECTED           



                                                  AREA TWICE           



                                                  DAILY, #           



                                                  66 gm, 1           



                                                  Refill(s),           



                                                  Pharmacy:           



                                                  [facility]           



                                                  [facility]           



                                                  #[id number],           



                                                  170.18,           



                                                  cm,            



                                                  20           



                                                  14:42:00           



                                                  CST,           



                                                  Height,           



                                                  164.318,           



                                                  kg,            



                                                  20           



                                                  14:42:00           



                                                  CST,           



                                                  Weight           

 

     Nitrofurant      2020-0      Yes                      100 mg = 1           

[facility]



     oin 100 MG      8-09                               cap, PO,           l



     Oral      17:57:                               BID, X 10           [facility]



     Capsule      00                                 day, # 20           



     [Macrobid]                                         cap, 0           



                                                  Refill(s),           



                                                  Pharmacy:           



                                                  [facility]           



                                                  [facility]           



                                                  #[id number],           



                                                  170.18,           



                                                  cm,            



                                                  20           



                                                  14:42:00           



                                                  CST,           



                                                  Height,           



                                                  164.318,           



                                                  kg,            



                                                  20           



                                                  14:42:00           



                                                  CST,           



                                                  Weight           

 

     Nitrofurant      2020-0      Yes                      100 mg = 1           

[facility]



     oin 100 MG      8-09                               cap, PO,           l



     Oral      17:57:                               BID, X 10           [place]



     Capsule      00                                 day, # 20           



     [Macrobid]                                         cap, 0           



                                                  Refill(s),           



                                                  Pharmacy:           



                                                  [facility]           



                                                  [facility]           



                                                  #[id number],           



                                                  170.18,           



                                                  cm,            



                                                  20           



                                                  14:42:00           



                                                  CST,           



                                                  Height,           



                                                  164.318,           



                                                  kg,            



                                                  20           



                                                  14:42:00           



                                                  CST,           



                                                  Weight           

 

     Nitrofurant      2020-0      Yes                      100 mg = 1           

[facility]



     oin 100 MG      8-09                               cap, PO,           l



     Oral      17:57:                               BID, X 10           [place]



     Capsule      00                                 day, # 20           



     [Macrobid]                                         cap, 0           



                                                  Refill(s),           



                                                  Pharm[facility]           



                                                  [facility]           



                                                  #[id number],           



                                                  170.18,           



                                                  cm,            



                                                  20           



                                                  14:42:00           



                                                  CST,           



                                                  Height,           



                                                  164.318,           



                                                  kg,            



                                                  20           



                                                  14:42:00           



                                                  CST,           



                                                  Weight           

 

     Nitrofurant      2020-0      Yes                      100 mg = 1           

[facility]



     oin 100 MG      8-09                               cap, PO,           l



     Oral      17:57:                               BID, X 10           [place]



     Capsule      00                                 day, # 20           



     [Macrobid]                                         cap, 0           



                                                  Refill(s),           



                                                  Pharmacy:           



                                                  [facility]           



                                                  [facility]           



                                                  #[id number],           



                                                  170.18,           



                                                  cm,            



                                                  20           



                                                  14:42:00           



                                                  CST,           



                                                  Height,           



                                                  164.318,           



                                                  kg,            



                                                  20           



                                                  14:42:00           



                                                  CST,           



                                                  Weight           

 

     lisinopril      2020-0      Yes                      2.5 mg = 1           [facility]

[facility]



     2.5 mg oral      6-04                               tab, PO,           l



     tablet      23:26:                               Daily, #           [facility]



               00                                 30 tab, 2           



                                                  Refill(s),           



                                                  called to           



                                                  pharmacy           

 

     lisinopril      2020-0      Yes                      2.5 mg = 1           [facility]

[facility]



     2.5 mg oral      6-04                               tab, PO,           l



     tablet      23:26:                               Daily, #           [facility]



               00                                 30 tab, 2           



                                                  Refill(s),           



                                                  called to           



                                                  pharmacy           

 

     lisinopril      2020-0      Yes                      2.5 mg = 1           [facility]

[facility]



     2.5 mg oral      6-04                               tab, PO,           l



     tablet      23:26:                               Daily, #           [facility]



               00                                 30 tab, 2           



                                                  Refill(s),           



                                                  called to           



                                                  pharmacy           

 

     lisinopril      2020-0      Yes                      2.5 mg = 1           [facility]

[facility]



     2.5 mg oral      6-04                               tab, PO,           l



     tablet      23:26:                               Daily, #           [facility]



               00                                 30 tab, 2           



                                                  Refill(s),           



                                                  called to           



                                                  pharmacy           

 

     calcitriol      2020-0      Yes                      = 1 cap,           [facility]

[facility]



     0.25 mcg      5-20                               PO, Daily,           l



     oral      12:18:                               # 90 cap,           [place]



     capsule      00                                 1              



                                                  Refill(s),           



                                                  Pharmacy:           



                                                  [facility]           



                                                  #[id number]           

 

     calcitriol      2020-0      Yes                      = 1 cap,           [facility]

[facility]



     0.25 mcg      5-20                               PO, Daily,           l



     oral      12:18:                               # 90 cap,           [place]



     capsule      00                                 1              



                                                  Refill(s),           



                                                  Pharmacy:           



                                                  [facility]           



                                                  #[id number]           

 

     calcitriol      2020-0      Yes                      = 1 cap,           [facility]

[facility]



     0.25 mcg      5-20                               PO, Daily,           l



     oral      12:18:                               # 90 cap,           [place]



     capsule      00                                 1              



                                                  Refill(s),           



                                                  Pharm[facility]           



                                                  [facility]           



                                                  [facility]           



                                                  #[id number]           

 

     calcitriol      2020-0      Yes                      = 1 cap,           [facility]

[facility]



     0.25 mcg      5-20                               PO, Daily,           l



     oral      12:18:                               # 90 cap,           [place]



     capsule      00                                 1              



                                                  Refill(s),           



                                                  Pharmacy:           



                                                  [facility]           



                                                  [facility]           



                                                  #[id number]           

 

     calcitriol      2020-0      Yes                      = 1 cap,           [facility]

[facility]



     0.25 mcg      4-16                               PO, Daily,           l



     oral      16:37:                               # 90           [place]



     capsule      47                                 unknown           



                                                  unit,           



                                                  Pharmacy:           



                                                  [facility]           



                                                  [facility]           



                                                  #[id number]           

 

     calcitriol      2020-0      Yes                      = 1 cap,           [facility]

[facility]



     0.25 mcg      4-16                               PO, Daily,           l



     oral      16:37:                               # 90           [place]



     capsule      47                                 unknown           



                                                  unit,           



                                                  Pharmacy:           



                                                  [facility]           



                                                  [facility]           



                                                  #[id number]           

 

     calcitriol      2020-0      Yes                      = 1 cap,           [facility]

[facility]



     0.25 mcg      4-16                               PO, Daily,           l



     oral      16:37:                               # 90           [place]



     capsule      47                                 unknown           



                                                  unit,           



                                                  Pharmacy:           



                                                  [facility]           



                                                  [facility]           



                                                  #[id number]           

 

     calcitriol      2020-0      Yes                      = 1 cap,           [facility]

[facility]



     0.25 mcg      4-16                               PO, Daily,           l



     oral      16:37:                               # 90           [place]



     capsule      47                                 unknown           



                                                  unit,           



                                                  Pharmacy:           



                                                  [facility]           



                                                  #[id number]           

 

     Furosemide      2020-0      Yes                      = 1 tab,           [facility]

[facility]



     40 MG Oral      4-13                               PO, Every           l



     Tablet      20:06:                               Other Day,           [place]

nn



               19                                 # 45 tab,           



                                                  Pharmacy:           



                                                  [facility]           



                                                  [facility]           



                                                  #[id number]           

 

     Furosemide      2020-0      Yes                      = 1 tab,           [facility]

[facility]



     40 MG Oral      4-13                               PO, Every           l



     Tablet      20:06:                               Other Day,           [place]

[facility]



               19                                 # 45 tab,           



                                                  Pharmacy:           



                                                  [facility]           



                                                  [facility]           



                                                  #[id number]           

 

     Furosemide      2020-0      Yes                      = 1 tab,           [facility]

[facility]



     40 MG Oral      4-13                               PO, Every           l



     Tablet      20:06:                               Other Day,           [place]

[facility]



               19                                 # 45 tab,           



                                                  Pharmacy:           



                                                  [facility]           



                                                  [facility]           



                                                  #[id number]           

 

     Furosemide      2020-0      Yes                      = 1 tab,           [facility]

[facility]



     40 MG Oral      4-13                               PO, Every           l



     Tablet      20:06:                               Other Day,           [place]

[facility]



               19                                 # 45 tab,           



                                                  Pharmacy:           



                                                  [facility]           



                                                  [facility]           



                                                  #[id number]           

 

     prednisolon      2020-0      Yes                      1 drop in           [facility]

[facility]



     e acetate      4-10                               each eye,           l



     10 MG/ML      20:53:                               once           [place]



     Ophthalmic      00                                 daily, 0           



     Suspension                                         Refill(s)           

 

     bromfenac      2020-0      Yes                      1 drop in           [facility]

[facility]



     0.7 MG/ML      4-10                               right eye,           l



     Ophthalmic      20:53:                               once           [facility]



     Solution      00                                 daily, 0           



     [Prolensa]                                         Refill(s)           

 

     prednisolon      2020-0      Yes                      1 drop in           [facility]

[facility]



     e acetate      4-10                               each eye,           l



     10 MG/ML      20:53:                               once           [facility]



     Ophthalmic      00                                 daily, 0           



     Suspension                                         Refill(s)           

 

     bromfenac      2020-0      Yes                      1 drop in           [facility]

[facility]



     0.7 MG/ML      4-10                               right eye,           l



     Ophthalmic      20:53:                               once           [facility]



     Solution      00                                 daily, 0           



     [Prolensa]                                         Refill(s)           

 

     prednisolon      2020-0      Yes                      1 drop in           [facility]

[facility]



     e acetate      4-10                               each eye,           l



     10 MG/ML      20:53:                               once           [place]



     Ophthalmic      00                                 daily, 0           



     Suspension                                         Refill(s)           

 

     bromfenac      2020-0      Yes                      1 drop in           [facility]

[facility]



     0.7 MG/ML      4-10                               right eye,           l



     Ophthalmic      20:53:                               once           [facility]



     Solution      00                                 daily, 0           



     [Prolensa]                                         Refill(s)           

 

     prednisolon      2020-0      Yes                      1 drop in           [facility]

[facility]



     e acetate      4-10                               each eye,           l



     10 MG/ML      20:53:                               once           [place]



     Ophthalmic      00                                 daily, 0           



     Suspension                                         Refill(s)           

 

     bromfenac      2020-0      Yes                      1 drop in           [facility]

[facility]



     0.7 MG/ML      4-10                               right eye,           l



     Ophthalmic      20:53:                               once           [place]



     Solution      00                                 daily, 0           



     [Prolensa]                                         Refill(s)           

 

     Furosemide      2020-0      Yes                      = 1 tab,           [facility]

[facility]



     40 MG Oral      1-23                               PO, Every           l



     Tablet      15:13:                               Other Day,           [place]



               34                                 # 45 tab,           



                                                  Pharmacy:           



                                                  [facility]           



                                                  #[id number]           

 

     Furosemide      2020-0      Yes                      = 1 tab,           [facility]

[facility]



     40 MG Oral      1-23                               PO, Every           l



     Tablet      15:13:                               Other Day,           [place]



               34                                 # 45 tab,           



                                                  [facility]           



                                                  [facility]           



                                                  [facility]           



                                                  #[id number]           

 

     Furosemide      2020-0      Yes                      = 1 tab,           [facility]

[facility]



     40 MG Oral      1-23                               PO, Every           l



     Tablet      15:13:                               Other Day,           [place]



               34                                 # 45 tab,           



                                                  Pharmacy:           



                                                  [facility]           



                                                  [facility]           



                                                  #[id number]           

 

     Furosemide      2020-0      Yes                      = 1 tab,           [facility]

[facility]



     40 MG Oral      1-23                               PO, Every           l



     Tablet      15:13:                               Other Day,           [place]



               34                                 # 45 tab,           



                                                  Pharmacy:           



                                                  [facility]           



                                                  [facility]           



                                                  #[id number]           

 

     Mupirocin      2019      Yes                      See            [facility]



     0.02 MG/MG      2-27                               Instructio           l



     Topical      19:11:                               ns, # 66           [facility]

n



     Ointment      15                                 gm, APPLY           



                                                  EXTERNALLY           



                                                  TO THE           



                                                  AFFECTED           



                                                  AREA TWICE           



                                                  DAILY,           



                                                  Pharmacy:           



                                                  [facility]           



                                                  [facility]           



                                                  #[id number]           

 

     Mupirocin      2019      Yes                      See            [facility]



     0.02 MG/MG      2-27                               Instructio           l



     Topical      19:11:                               ns, # 66           [facility]

n



     Ointment      15                                 gm, APPLY           



                                                  EXTERNALLY           



                                                  TO THE           



                                                  AFFECTED           



                                                  AREA TWICE           



                                                  DAILY,           



                                                  Pharmacy:           



                                                  [facility]           



                                                  [facility]           



                                                  #[id number]           

 

     Mupirocin      2019      Yes                      See            [facility]



     0.02 MG/MG      2-27                               Instructio           l



     Topical      19:11:                               ns, # 66           [facility]

n



     Ointment      15                                 gm, APPLY           



                                                  EXTERNALLY           



                                                  TO THE           



                                                  AFFECTED           



                                                  AREA TWICE           



                                                  DAILY,           



                                                  Pharmacy:           



                                                  [facility]           



                                                  [facility]           



                                                  #[id number]           

 

     Mupirocin      2019      Yes                      See            [facility]



     0.02 MG/MG      2-27                               Instructio           l



     Topical      19:11:                               ns, # 66           [facility]

n



     Ointment      15                                 gm, APPLY           



                                                  EXTERNALLY           



                                                  TO THE           



                                                  AFFECTED           



                                                  AREA TWICE           



                                                  DAILY,           



                                                  Pharmacy:           



                                                  [facility]           



                                                  [facility]           



                                                  #[id number]           

 

     Nitrofurant      2019      Yes                      100 mg = 1           

[facility]



     oin 100 MG      2-13                               cap, PO,           l



     Oral      01:44:                               BID, X 5           [place]



     Capsule      00                                 day, # 10           



     [Macrodanti                                         cap, 0           



     n]                                           Refill(s),           



                                                  Pharmacy:           



                                                  [facility]           



                                                  [facility]           



                                                  #[id number]           

 

     Nitrofurant      2019      Yes                      100 mg = 1           

[facility]



     oin 100 MG      2-13                               cap, PO,           l



     Oral      01:44:                               BID, X 5           [place]



     Capsule      00                                 day, # 10           



     [Macrodanti                                         cap, 0           



     n]                                           Refill(s),           



                                                  Pharmacy:           



                                                  [facility]           



                                                  [facility]           



                                                  #[id number]           

 

     Nitrofurant      2019      Yes                      100 mg = 1           

[facility]



     oin 100 MG      2-13                               cap, PO,           l



     Oral      01:44:                               BID, X 5           [place]



     Capsule      00                                 day, # 10           



     [Macrodanti                                         cap, 0           



     n]                                           Refill(s),           



                                                  Pharmacy:           



                                                  [facility]           



                                                  [facility]           



                                                  #[id number]           

 

     Nitrofurant      2019      Yes                      100 mg = 1           

[facility]



     oin 100 MG      2-13                               cap, PO,           l



     Oral      01:44:                               BID, X 5           [place]



     Capsule      00                                 day, # 10           



     [Macrodanti                                         cap, 0           



     n]                                           Refill(s),           



                                                  Pharmacy:           



                                                  [facility]           



                                                  [facility] STORE           



                                                  #[id number]           

 

     apixaban 5      2019      Yes                      5 mg, PO,           [facility]

[facility]



     MG Oral      0-25                               Q12H, 0           l



     Tablet      15:07:                               Refill(s)           [facility]

n



     [Eliquis]      00                                                

 

     metoprolol      2019      Yes                      50 mg = 1           [facility]

[facility]



     tartrate 50      0-25                               tab, PO,           l



     mg oral      15:07:                               BID, # 180           [facility]

[facility]



     tablet      00                                 tab, 0           



                                                  Refill(s)           

 

     Diltiazem      2019      Yes                      180 mg = 1           [facility]

[facility]



     Hydrochlori      0-25                               cap, PO,           l



     de       15:07:                               Daily, #           [facility]

[facility]



     mg/24 hours      00                                 30 cap, 0           



     oral                                         Refill(s)           



     capsule,                                                        



     extended                                                        



     release                                                        

 

     tramadol      2019      Yes                      150 mg = 1           [facility]

[facility]



     150 mg/24      0-25                               cap, PO,           l



     hours oral      15:07:                               Daily, 0           [facility]

[facility]



     capsule,      00                                 Refill(s)           



     extended                                                        



     release                                                        

 

     Acetaminoph      2019      Yes                      1 tab, PO,           

[facility]



     en 325 MG /      0-25                               Q6H, 0           l



     Hydrocodone      15:07:                               Refill(s)           [facility]

[facility]



     Bitartrate      00                                                



     5 MG Oral                                                        



     Tablet                                                        



     [Norco                                                        



     5/325]                                                        

 

     apixaban 2019      Yes                      5 mg, PO,           [facility]

[facility]



     MG Oral      0-25                               Q12H, 0           l



     Tablet      15:07:                               Refill(s)           Don[facility]

n



     [Eliquis]      00                                                

 

     metoprolol      2019      Yes                      50 mg = 1           [facility]

[facility]



     tartrate 50      0-25                               tab, PO,           l



     mg oral      15:07:                               BID, # 180           [facility]

daryl



     tablet      00                                 tab, 0           



                                                  Refill(s)           

 

     Diltiazem      2019      Yes                      180 mg = 1           [facility]

[facility]



     Hydrochlori      0-25                               cap, PO,           l



     de       15:07:                               Daily, #           [facility]

d[facility]



     mg/24 hours      00                                 30 cap, 0           



     oral                                         Refill(s)           



     capsule,                                                        



     extended                                                        



     release                                                        

 

     tramadol      2019      Yes                      150 mg = 1           [facility]

[facility]



     150 mg/24      0-25                               cap, PO,           l



     hours oral      15:07:                               Daily, 0           Her

[facility]



     capsule,      00                                 Refill(s)           



     extended                                                        



     release                                                        

 

     Acetaminoph      2019      Yes                      1 tab, PO,           

[facility]



     en 325 MG /      0-25                               Q6H, 0           l



     Hydrocodone      15:07:                               Refill(s)           [facility]

[facility]



     Bitartrate      00                                                



     5 MG Oral                                                        



     Tablet                                                        



     [Norco                                                        



     5/325]                                                        

 

     apixaban 5      2019      Yes                      5 mg, PO,           [facility]

[facility]



     MG Oral      0-25                               Q12H, 0           l



     Tablet      15:07:                               Refill(s)           [facility]



     [Eliquis]      00                                                

 

     metoprolol      2019      Yes                      50 mg = 1           [facility]

[facility]



     tartrate 50      0-25                               tab, PO,           l



     mg oral      15:07:                               BID, # 180           [facility]

d[facility]



     tablet      00                                 tab, 0           



                                                  Refill(s)           

 

     Diltiazem      2019      Yes                      180 mg = 1           [facility]

[facility]



     Hydrochlori      0-25                               cap, PO,           l



     de       15:07:                               Daily, #           [facility]

[facility]



     mg/24 hours      00                                 30 cap, 0           



     oral                                         Refill(s)           



     capsule,                                                        



     extended                                                        



     release                                                        

 

     tramadol      2019      Yes                      150 mg = 1           [facility]

[facility]



     150 mg/24      0-25                               cap, PO,           l



     hours oral      15:07:                               Daily, 0           Her

[facility]



     capsule,      00                                 Refill(s)           



     extended                                                        



     release                                                        

 

     Acetaminoph      2019      Yes                      1 tab, PO,           

[facility]



     en 325 MG /      0-25                               Q6H, 0           l



     Hydrocodone      15:07:                               Refill(s)           H

[facility]



     Bitartrate      00                                                



     5 MG Oral                                                        



     Tablet                                                        



     [Norco                                                        



     5/325]                                                        

 

     apixaban 5      2019      Yes                      5 mg, PO,           [facility]

[facility]



     MG Oral      0-25                               Q12H, 0           l



     Tablet      15:07:                               Refill(s)           [facility] martinez



     [Eliquis]      00                                                

 

     metoprolol      2019      Yes                      50 mg = 1           [facility]

[facility]



     tartrate 50      0-25                               tab, PO,           l



     mg oral      15:07:                               BID, # 180           [facility]

d[facility]



     tablet      00                                 tab, 0           



                                                  Refill(s)           

 

     Diltiazem      2019      Yes                      180 mg = 1           [facility]

[facility]



     Hydrochlori      0-25                               cap, PO,           l



     de       15:07:                               Daily, #           [facility]

d[facility]



     mg/24 hours      00                                 30 cap, 0           



     oral                                         Refill(s)           



     capsule,                                                        



     extended                                                        



     release                                                        

 

     tramadol      2019      Yes                      150 mg = 1           [facility]

[facility]



     150 mg/24      0-25                               cap, PO,           l



     hours oral      15:07:                               Daily, 0           Her

[facility]



     capsule,      00                                 Refill(s)           



     extended                                                        



     release                                                        

 

     Acetaminoph      2019      Yes                      1 tab, PO,           

[facility]



     en 325 MG /      0-25                               Q6H, 0           l



     Hydrocodone      15:07:                               Refill(s)           H

[facility]



     Bitartrate      00                                                



     5 MG Oral                                                        



     Tablet                                                        



     [Norco                                                        



     5/325]                                                        

 

     calcitriol      2019      Yes                      = 1 cap,           [facility]

[facility]



     0.25 mcg      0-11                               PO, Daily,           l



     oral      21:10:                               # 90           [place]



     capsule      30                                 unknown           



                                                  unit,           



                                                  Pharmacy:           



                                                  [facility]           



                                                  [facility]           



                                                  #[id number]           

 

     calcitriol      2019      Yes                      = 1 cap,           [facility]

[facility]



     0.25 mcg      0-11                               PO, Daily,           l



     oral      21:10:                               # 90           [place]



     capsule      30                                 unknown           



                                                  unit,           



                                                  Pharmacy:           



                                                  [facility]           



                                                  [facility]           



                                                  #[id number]           

 

     calcitriol      2019      Yes                      = 1 cap,           [facility]

[facility]



     0.25 mcg      0-11                               PO, Daily,           l



     oral      21:10:                               # 90           [place]



     capsule      30                                 unknown           



                                                  unit,           



                                                  Pharmacy:           



                                                  [facility]           



                                                  [facility]           



                                                  #[id number]           

 

     calcitriol      2019      Yes                      = 1 cap,           [facility]

[facility]



     0.25 mcg      0-11                               PO, Daily,           l



     oral      21:10:                               # 90           [place]



     capsule      30                                 unknown           



                                                  unit,           



                                                  Pharmacy:           



                                                  [facility]           



                                                  [facility] [place]           



                                                  #[id number]           

 

     gabapentin            Yes                      300 mg = 1           [facility]

[facility]



     300 MG Oral      9-27                               cap, PO,           l



     Capsule      20:54:                               BID, # 180           [facility]

[facility]



               00                                 cap, 3           



                                                  Refill(s),           



                                                  called to           



                                                  pharmacy           

 

     gabapentin            Yes                      300 mg = 1           [facility]

[facility]



     300 MG Oral      9-27                               cap, PO,           l



     Capsule      20:54:                               BID, # 180           [facility]

[facility]



               00                                 cap, 3           



                                                  Refill(s),           



                                                  called to           



                                                  pharmacy           

 

     gabapentin      2019-      Yes                      300 mg = 1           [facility]

[facility]



     300 MG Oral      9-27                               cap, PO,           l



     Capsule      20:54:                               BID, # 180           [facility]

[facility]



               00                                 cap, 3           



                                                  Refill(s),           



                                                  called to           



                                                  pharmacy           

 

     gabapentin      2019-      Yes                      300 mg = 1           [facility]

[facility]



     300 MG Oral      9-27                               cap, PO,           l



     Capsule      20:54:                               BID, # 180           [facility]

d[facility]



               00                                 cap, 3           



                                                  Refill(s),           



                                                  called to           



                                                  pharmacy           

 

     allopurinol      2019-      Yes                      = 1 tab,           [facility]

[facility]



     300 mg oral      8-17                               PO, Daily,           l



     tablet      02:39:                               # 90 tab,           Donny

n



               31                                 [facility]arm[facility]           



                                                  [facility]           



                                                  #[id number]           

 

     allopurinol            Yes                      = 1 tab,           [facility]

[facility]



     300 mg oral      8-17                               PO, Daily,           l



     tablet      02:39:                               # 90 tab,           Donny

n



               31                                 Pharmacy:           



                                                  [facility]           



                                                  [facility]           



                                                  #[id number]           

 

     allopurinol      2019-0      Yes                      = 1 tab,           [facility]

[facility]



     300 mg oral      8-17                               PO, Daily,           l



     tablet      02:39:                               # 90 tab,           Donny

n



               31                                 Pharmacy:           



                                                  [facility]           



                                                  [facility]           



                                                  #[id number]           

 

     allopurinol      2019-0      Yes                      = 1 tab,           [facility]

[facility]



     300 mg oral      8-17                               PO, Daily,           l



     tablet      02:39:                               # 90 tab,           Don[facility]

n



               31                                 Pharmacy:           



                                                  [facility]           



                                                  [facility]           



                                                  #[id number]           

 

     QUEtiapine      2019-0      Yes                      50 mg = 1           [facility]

[facility]



     50 mg oral      6-06                               tab, PO,           l



     tablet      15:28:                               Bedtime, #           [facility]

nn



               00                                 30 tab, 1           



                                                  Refill(s)           

 

     QUEtiapine      2019-0      Yes                      50 mg = 1           [facility]

[facility]



     50 mg oral      6-06                               tab, PO,           l



     tablet      15:28:                               Bedtime, #           [facility]

nn



               00                                 30 tab, 1           



                                                  Refill(s)           

 

     QUEtiapine      2019-0      Yes                      50 mg = 1           [facility]

[facility]



     50 mg oral      6-06                               tab, PO,           l



     tablet      15:28:                               Bedtime, #           [facility]

nn



               00                                 30 tab, 1           



                                                  Refill(s)           

 

     QUEtiapine      2019-0      Yes                      50 mg = 1           [facility]

[facility]



     50 mg oral      6-06                               tab, PO,           l



     tablet      15:28:                               Bedtime, #           [facility]

nn



               00                                 30 tab, 1           



                                                  Refill(s)           

 

     eletriptan      2019-0      Yes                      40 mg = 1           [facility]

[facility]



     40 mg oral      6-06                               tab, PO,           l



     tablet      15:26:                               Daily, PRN           [facility]

nn



               00                                 for            



                                                  migraine           



                                                  headache,           



                                                  may repeat           



                                                  dose once           



                                                  in 2           



                                                  hours, # 6           



                                                  tab, 0           



                                                  Refill(s)           

 

     eletriptan      2019-0      Yes                      40 mg = 1           [facility]

[facility]



     40 mg oral      6-06                               tab, PO,           l



     tablet      15:26:                               Daily, PRN           [facility]

nn



               00                                 for            



                                                  migraine           



                                                  headache,           



                                                  may repeat           



                                                  dose once           



                                                  in 2           



                                                  hours, # 6           



                                                  tab, 0           



                                                  Refill(s)           

 

     eletriptan      2019-0      Yes                      40 mg = 1           [facility]

[facility]



     40 mg oral      6-06                               tab, PO,           l



     tablet      15:26:                               Daily, PRN           [facility]

nn



               00                                 for            



                                                  migraine           



                                                  headache,           



                                                  may repeat           



                                                  dose once           



                                                  in 2           



                                                  hours, # 6           



                                                  tab, 0           



                                                  Refill(s)           

 

     eletriptan            Yes                      40 mg = 1           [facility]

[facility]



     40 mg oral      6-06                               tab, PO,           l



     tablet      15:26:                               Daily, PRN           [facility]

nn



               00                                 for            



                                                  migraine           



                                                  headache,           



                                                  may repeat           



                                                  dose once           



                                                  in 2           



                                                  hours, # 6           



                                                  tab, 0           



                                                  Refill(s)           

 

     atorvastati            Yes                      See            [facility]

a



     n 40 mg      3-14                               Instructio           l



     oral tablet      15:07:                               ns, TAKE 1           

[place]



               35                                 TABLET BY           



                                                  MOUTH           



                                                  EVERY           



                                                  NIGHT AT           



                                                  BEDTIME, #           



                                                  90 tab, 1           



                                                  Refill(s),           



                                                  Pharmacy:           



                                                  [facility]           



                                                  [facility]           



                                                  [id number]           

 

     atorvastati            Yes                      See            [facility]

a



     n 40 mg      3-14                               Instructio           l



     oral tablet      15:07:                               ns, TAKE 1           

[place]



               35                                 TABLET BY           



                                                  MOUTH           



                                                  EVERY           



                                                  NIGHT AT           



                                                  BEDTIME, #           



                                                  90 tab, 1           



                                                  Refill(s),           



                                                  Pharmacy:           



                                                  [facility]           



                                                  [facility]           



                                                  [id number]           

 

     atorvastati            Yes                      See            [facility]

a



     n 40 mg      3-14                               Instructio           l



     oral tablet      15:07:                               ns, TAKE 1           

[facility]



               35                                 TABLET BY           



                                                  MOUTH           



                                                  EVERY           



                                                  NIGHT AT           



                                                  BEDTIME, #           



                                                  90 tab, 1           



                                                  Refill(s),           



                                                  Pharmacy:           



                                                  [facility]           



                                                  [facility]           



                                                  [id number]           

 

     atorvastati            Yes                      See            [facility]

a



     n 40 mg      3-14                               Instructio           l



     oral tablet      15:07:                               ns, TAKE 1           

[place]



               35                                 TABLET BY           



                                                  MOUTH           



                                                  EVERY           



                                                  NIGHT AT           



                                                  BEDTIME, #           



                                                  90 tab, 1           



                                                  Refill(s),           



                                                  Pharmacy:           



                                                  [facility]           



                                                  [facility]           



                                                  [id number]           

 

     amLODIPine            Yes                      See            [facility]



     5 mg oral      3-14                               Instructio           l



     tablet      15:07:                               ns, TAKE 1           [facility]

nn



               33                                 TABLET BY           



                                                  MOUTH           



                                                  EVERY DAY,           



                                                  # 90 tab,           



                                                  1              



                                                  Refill(s),           



                                                  Pharmacy:           



                                                  [facility]           



                                                  [facility]           



                                                  [id number]           

 

     amLODIPine            Yes                      See            [facility]



     5 mg oral      3-14                               Instructio           l



     tablet      15:07:                               ns, TAKE 1           [facility]

nn



               33                                 TABLET BY           



                                                  MOUTH           



                                                  EVERY DAY,           



                                                  # 90 tab,           



                                                  1              



                                                  Refill(s),           



                                                  Pharmacy:           



                                                  [facility]           



                                                  [facility]           



                                                  [id number]           

 

     amLODIPine            Yes                      See            [facility]



     5 mg oral      3-14                               Instructio           l



     tablet      15:07:                               ns, TAKE 1           [facility]

nn



               33                                 TABLET BY           



                                                  MOUTH           



                                                  EVERY DAY,           



                                                  # 90 tab,           



                                                  1              



                                                  Refill(s),           



                                                  Pharmacy:           



                                                  [facility]           



                                                  [facility]           



                                                  [id number]           

 

     amLODIPine            Yes                      See            [facility]



     5 mg oral      3-14                               Instructio           l



     tablet      15:07:                               ns, TAKE 1           [facility]

nn



               33                                 TABLET BY           



                                                  MOUTH           



                                                  EVERY DAY,           



                                                  # 90 tab,           



                                                  1              



                                                  Refill(s),           



                                                  Pharmacy:           



                                                  [facility]           



                                                  [facility]           



                                                  [id number]           

 

     lisinopril            Yes                      See            [facility]



     5 mg oral      8-21                               Instructio           l



     tablet      19:00:                               ns, TAKE 1           [facility]

nn



               30                                 TABLET BY           



                                                  MOUTH           



                                                  DAILY, #           



                                                  90 tab, 1           



                                                  Refill(s),           



                                                  Pharmacy:           



                                                  [facility]           



                                                  [facility]           



                                                  [id number]           

 

     lisinopril            Yes                      See            [facility]



     5 mg oral      8-21                               Instructio           l



     tablet      19:00:                               ns, TAKE 1           [facility]

nn



               30                                 TABLET BY           



                                                  MOUTH           



                                                  DAILY, #           



                                                  90 tab, 1           



                                                  Refill(s),           



                                                  Pharmacy:           



                                                  [facility]           



                                                  [facility]           



                                                  [id number]           

 

     lisinopril            Yes                      See            [facility]



     5 mg oral      8-21                               Instructio           l



     tablet      19:00:                               ns, TAKE 1           [facility]

nn



               30                                 TABLET BY           



                                                  MOUTH           



                                                  DAILY, #           



                                                  90 tab, 1           



                                                  Refill(s),           



                                                  Pharmacy:           



                                                  [facility]           



                                                  [facility]           



                                                  [id number]           

 

     lisinopril            Yes                      See            [facility]



     5 mg oral      8-21                               Instructio           l



     tablet      19:00:                               ns, TAKE 1           [facility]

nn



               30                                 TABLET BY           



                                                  MOUTH           



                                                  DAILY, #           



                                                  90 tab, 1           



                                                  Refill(s),           



                                                  Pharmacy:           



                                                  [facility]           



                                                  [facility]           



                                                  [id number]           

 

     atorvastati            Yes                      See            [facility]

a



     n 40 mg      8-21                               Instructio           l



     oral tablet      18:50:                               ns, TAKE 1           

[place]



               45                                 TABLET BY           



                                                  MOUTH           



                                                  EVERY           



                                                  NIGHT AT           



                                                  BEDTIME, #           



                                                  30 tab, 5           



                                                  Refill(s),           



                                                  Pharmacy:           



                                                  [facility]           



                                                  [facility]           



                                                  [id number]           

 

     atorvastati            Yes                      See            [facility]

a



     n 40 mg      8-21                               Instructio           l



     oral tablet      18:50:                               ns, TAKE 1           

[place]



               45                                 TABLET BY           



                                                  MOUTH           



                                                  EVERY           



                                                  NIGHT AT           



                                                  BEDTIME, #           



                                                  30 tab, 5           



                                                  Refill(s),           



                                                  Pharmacy:           



                                                  [facility]           



                                                  [facility]           



                                                  [id number]           

 

     atorvastati            Yes                      See            [facility]

a



     n 40 mg      8-21                               Instructio           l



     oral tablet      18:50:                               ns, TAKE 1           

[place]



               45                                 TABLET BY           



                                                  MOUTH           



                                                  EVERY           



                                                  NIGHT AT           



                                                  BEDTIME, #           



                                                  30 tab, 5           



                                                  Refill(s),           



                                                  Pharmacy:           



                                                  [facility]           



                                                  [facility]           



                                                  [id number]           

 

     atorvastati            Yes                      See            [facility]

a



     n 40 mg      8-21                               Instructio           l



     oral tablet      18:50:                               ns, TAKE 1           

[place]



               45                                 TABLET BY           



                                                  MOUTH           



                                                  EVERY           



                                                  NIGHT AT           



                                                  BEDTIME, #           



                                                  30 tab, 5           



                                                  Refill(s),           



                                                  Pharmacy:           



                                                  [facility]           



                                                  [facility]           



                                                  [id number]           

 

     amLODIPine            Yes                      See            [facility]



     5 mg oral      8-21                               Instructio           l



     tablet      18:50:                               ns, TAKE 1           [facility]

nn



               41                                 TABLET BY           



                                                  MOUTH           



                                                  EVERY DAY,           



                                                  # 30 tab,           



                                                  5              



                                                  Refill(s),           



                                                  Pharmacy:           



                                                  [facility]           



                                                  [facility]           



                                                  [id number]           

 

     amLODIPine            Yes                      See            [facility]



     5 mg oral      8-21                               Instructio           l



     tablet      18:50:                               ns, TAKE 1           [facility]

nn



               41                                 TABLET BY           



                                                  MOUTH           



                                                  EVERY DAY,           



                                                  # 30 tab,           



                                                  5              



                                                  Refill(s),           



                                                  Pharmacy:           



                                                  [facility]           



                                                  [facility]           



                                                  [id number]           

 

     amLODIPine            Yes                      See            [facility]



     5 mg oral      8-21                               Instructio           l



     tablet      18:50:                               ns, TAKE 1           [facility]

nn



               41                                 TABLET BY           



                                                  MOUTH           



                                                  EVERY DAY,           



                                                  # 30 tab,           



                                                  5              



                                                  Refill(s),           



                                                  Pharmacy:           



                                                  [facility]           



                                                  [facility]           



                                                  [id number]           

 

     amLODIPine            Yes                      See            [facility]



     5 mg oral      8-21                               Instructio           l



     tablet      18:50:                               ns, TAKE 1           [facility]

nn



               41                                 TABLET BY           



                                                  MOUTH           



                                                  EVERY DAY,           



                                                  # 30 tab,           



                                                  5              



                                                  Refill(s),           



                                                  Pharmacy:           



                                                  [facility]           



                                                  [facility]           



                                                  [id number]           

 

     lisinopril            No                       See            [facility]



     5 mg oral      7-31                               Instructio           l



     tablet      15:28:                               ns, # 90           [facility]



               34                                 tab, TAKE           



                                                  1 TABLET           



                                                  BY MOUTH           



                                                  DAILY,           



                                                  Pharmacy:           



                                                  [facility]           



                                                  [facility]           



                                                  [id number]           

 

     lisinopril            No                       See            [facility]



     5 mg oral      7-31                               Instructio           l



     tablet      15:28:                               ns, # 90           [place]



               34                                 tab, TAKE           



                                                  1 TABLET           



                                                  BY MOUTH           



                                                  DAILY,           



                                                  Pharmacy:           



                                                  [facility]           



                                                  [facility]           



                                                  [id number]           

 

     lisinopril            No                       See            [facility]



     5 mg oral      7-31                               Instructio           l



     tablet      15:28:                               ns, # 90           [place]



               34                                 tab, TAKE           



                                                  1 TABLET           



                                                  BY MOUTH           



                                                  DAILY,           



                                                  Pharmacy:           



                                                  [facility]           



                                                  [facility]           



                                                  [id number]           

 

     lisinopril            No                       See            [facility]



     5 mg oral      7-31                               Instructio           l



     tablet      15:28:                               ns, # 90           [place]



               34                                 tab, TAKE           



                                                  1 TABLET           



                                                  BY MOUTH           



                                                  DAILY,           



                                                  Pharmacy:           



                                                  [facility]           



                                                  [facility]           



                                                  [id number]           

 

     allopurinol            No                       300 mg = 1           

[facility]



     300 mg oral      5-10                               tab, PO,           l



     tablet      13:59:                               Daily, #           [place]



               00                                 90 tab, 1           



                                                  Refill(s),           



                                                  Pharmacy:           



                                                  [place] [id number]           

 

     allopurinol            No                       300 mg = 1           

[facility]



     300 mg oral      5-10                               tab, PO,           l



     tablet      13:59:                               Daily, #           [facility]



               00                                 90 tab, 1           



                                                  Refill(s),           



                                                  Pharmacy:           



                                                  [place] [address]           

 

     allopurinol            No                       300 mg = 1           

[facility]



     300 mg oral      5-10                               tab, PO,           l



     tablet      13:59:                               Daily, #           [place]



               00                                 90 tab, 1           



                                                  Refill(s),           



                                                  Pharmacy:           



                                                  [place] [address]           

 

     allopurinol            No                       300 mg = 1           

[facility]



     300 mg oral      5-10                               tab, PO,           l



     tablet      13:59:                               Daily, #           [place]



               00                                 90 tab, 1           



                                                  Refill(s),           



                                                  Pharmacy:           



                                                  [place] [address]           

 

     lisinopril            No                       See            [facility]



     5 mg oral      5-01                               Instructio           l



     tablet      12:38:                               ns, # 90           [place]



               18                                 tab, TAKE           



                                                  1 TABLET           



                                                  BY MOUTH           



                                                  DAILY,           



                                                  Pharmacy:           



                                                  [place] [address]           

 

     ALLOPURINOL            Yes                      See            [facility]

a



     300MG      5-01                               Instructio           l



     TABLETS      12:38:                               ns, # 90           [facility]

n



               18                                 tab, TAKE           



                                                  1 TABLET           



                                                  BY MOUTH           



                                                  DAILY,           



                                                  Pharmacy:           



                                                  [place] [address]           

 

     lisinopril            No                       See            [facility]



     5 mg oral      5-01                               Instructio           l



     tablet      12:38:                               ns, # 90           [place]



               18                                 tab, TAKE           



                                                  1 TABLET           



                                                  BY MOUTH           



                                                  DAILY,           



                                                  Pharmacy:           



                                                  [place] [address]           

 

     ALLOPURINOL            Yes                      See            [facility]

a



     300MG      5-01                               Instructio           l



     TABLETS      12:38:                               ns, # 90           [facility]

n



               18                                 tab, TAKE           



                                                  1 TABLET           



                                                  BY MOUTH           



                                                  DAILY,           



                                                  Pharmacy:           



                                                  [place] [address]           

 

     lisinopril      0      No                       See            [facility]



     5 mg oral      5-01                               Instructio           l



     tablet      12:38:                               ns, # 90           [place]



               18                                 tab, TAKE           



                                                  1 TABLET           



                                                  BY MOUTH           



                                                  DAILY,           



                                                  Pharmacy:           



                                                  [place] [address]           

 

     ALLOPURINOL      0      Yes                      See            [facility]

a



     300MG      5-01                               Instructio           l



     TABLETS      12:38:                               ns, # 90           [facility]

n



               18                                 tab, TAKE           



                                                  1 TABLET           



                                                  BY MOUTH           



                                                  DAILY,           



                                                  Pharmacy:           



                                                  [place] [address]           

 

     lisinopril            No                       See            [facility]



     5 mg oral      5-01                               Instructio           l



     tablet      12:38:                               ns, # 90           [place]



               18                                 tab, TAKE           



                                                  1 TABLET           



                                                  BY MOUTH           



                                                  DAILY,           



                                                  Pharmacy:           



                                                  [place] [address]           

 

     ALLOPURINOL            Yes                      See            [facility]

a



     300MG      5-01                               Instructio           l



     TABLETS      12:38:                               ns, # 90           [facility]

n



               18                                 tab, TAKE           



                                                  1 TABLET           



                                                  BY MOUTH           



                                                  DAILY,           



                                                  Pharmacy:           



                                                  [place] [address]           

 

     calcitriol            Yes                      0.25           [facility]



     0.25 mcg      3-28                               microgram           l



     oral      13:49:                               = 1 cap,           [place]



     capsule      00                                 PO, Daily,           



                                                  # 90 cap,           



                                                  3              



                                                  Refill(s),           



                                                  Pharmacy:           



                                                  [place] [address]           

 

     calcitriol            Yes                      0.25           [facility]



     0.25 mcg      3-28                               microgram           l



     oral      13:49:                               = 1 cap,           [facility]



     capsule      00                                 PO, Daily,           



                                                  # 90 cap,           



                                                  3              



                                                  Refill(s),           



                                                  Pharmacy:           



                                                  [facility]           



                                                  [facility]           



                                                  [id number]           

 

     calcitriol            Yes                      0.25           [facility]



     0.25 mcg      3-28                               microgram           l



     oral      13:49:                               = 1 cap,           [place]



     capsule      00                                 PO, Daily,           



                                                  # 90 cap,           



                                                  3              



                                                  Refill(s),           



                                                  Pharmacy:           



                                                  [facility]           



                                                  [facility]           



                                                  [id number]           

 

     calcitriol            Yes                      0.25           [facility]



     0.25 mcg      3-28                               microgram           l



     oral      13:49:                               = 1 cap,           [place]



     capsule      00                                 PO, Daily,           



                                                  # 90 cap,           



                                                  3              



                                                  Refill(s),           



                                                  Pharmacy:           



                                                  [facility]           



                                                  [facility]           



                                                  [id number]           

 

     Mupirocin            Yes                      1 appl,           [facility]

ia



     0.02 MG/MG      3-21                               TOP, BID,           l



     Topical      15:37:                               30 grams           [facility]

n



     Ointment      21                                 3each, # 3           



                                                  ea, 1           



                                                  Refill(s),           



                                                  Pharmacy:           



                                                  [facility]           



                                                  [facility]           



                                                  [id number]           

 

     Mupirocin            Yes                      1 appl,           [facility]

ia



     0.02 MG/MG      3-21                               TOP, BID,           l



     Topical      15:37:                               30 grams           [facility]

n



     Ointment      21                                 3each, # 3           



                                                  ea, 1           



                                                  Refill(s),           



                                                  Pharmacy:           



                                                  [facility]           



                                                  [facility]           



                                                  [id number]           

 

     Mupirocin            Yes                      1 appl,           [facility]

ia



     0.02 MG/MG      3-21                               TOP, BID,           l



     Topical      15:37:                               30 grams           [facility]

n



     Ointment      21                                 3each, # 3           



                                                  ea, 1           



                                                  Refill(s),           



                                                  Pharmacy:           



                                                  [place] [address]           

 

     Mupirocin            Yes                      1 appl,           [facility]

ia



     0.02 MG/MG      3-21                               TOP, BID,           l



     Topical      15:37:                               30 grams           [facility]

n



     Ointment      21                                 3each, # 3           



                                                  ea, 1           



                                                  Refill(s),           



                                                  Pharmacy:           



                                                  [place] [address]           

 

     atorvastati            Yes                      See            [facility]

a



     n 40 mg      3-21                               Instructio           l



     oral tablet      15:36:                               ns, TAKE 1           

[place]



               22                                 TABLET BY           



                                                  MOUTH           



                                                  EVERY           



                                                  NIGHT AT           



                                                  BEDTIME, #           



                                                  30 tab, 5           



                                                  Refill(s),           



                                                  Pharmacy:           



                                                  [place] [address]           

 

     atorvastati            Yes                      See            [facility]

a



     n 40 mg      3-21                               Instructio           l



     oral tablet      15:36:                               ns, TAKE 1           

[place]



               22                                 TABLET BY           



                                                  MOUTH           



                                                  EVERY           



                                                  NIGHT AT           



                                                  BEDTIME, #           



                                                  30 tab, 5           



                                                  Refill(s),           



                                                  Pharmacy:           



                                                  [place] [address]           

 

     atorvastati            Yes                      See            [facility]

a



     n 40 mg      3-21                               Instructio           l



     oral tablet      15:36:                               ns, TAKE 1           

[place]



               22                                 TABLET BY           



                                                  MOUTH           



                                                  EVERY           



                                                  NIGHT AT           



                                                  BEDTIME, #           



                                                  30 tab, 5           



                                                  Refill(s),           



                                                  Pharmacy:           



                                                  [place] [address]           

 

     atorvastati            Yes                      See            [facility]

a



     n 40 mg      3-21                               Instructio           l



     oral tablet      15:36:                               ns, TAKE 1           

[place]



               22                                 TABLET BY           



                                                  MOUTH           



                                                  EVERY           



                                                  NIGHT AT           



                                                  BEDTIME, #           



                                                  30 tab, 5           



                                                  Refill(s),           



                                                  Pharmacy:           



                                                  [place] [address]           

 

     amLODIPine            Yes                      See            [facility]



     5 mg oral      3-21                               Instructio           l



     tablet      15:36:                               ns, TAKE 1           [place]

nn



               18                                 TABLET BY           



                                                  MOUTH           



                                                  EVERY DAY,           



                                                  # 30 tab,           



                                                  5              



                                                  Refill(s),           



                                                  Pharmacy:           



                                                  [place] [address]           

 

     amLODIPine            Yes                      See            [facility]



     5 mg oral      3-21                               Instructio           l



     tablet      15:36:                               ns, TAKE 1           [place]

nn



               18                                 TABLET BY           



                                                  MOUTH           



                                                  EVERY DAY,           



                                                  # 30 tab,           



                                                  5              



                                                  Refill(s),           



                                                  Pharmacy:           



                                                  [place] [address]           

 

     amLODIPine            Yes                      See            [facility]



     5 mg oral      3-21                               Instructio           l



     tablet      15:36:                               ns, TAKE 1           [place]

nn



               18                                 TABLET BY           



                                                  MOUTH           



                                                  EVERY DAY,           



                                                  # 30 tab,           



                                                  5              



                                                  Refill(s),           



                                                  Pharmacy:           



                                                  [facility]           



                                                  [facility]           



                                                  [id number]           

 

     amLODIPine      2018-0      Yes                      See            [facility]



     5 mg oral      3-21                               Instructio           l



     tablet      15:36:                               ns, TAKE 1           [place]

nn



               18                                 TABLET BY           



                                                  MOUTH           



                                                  EVERY DAY,           



                                                  # 30 tab,           



                                                  5              



                                                  Refill(s),           



                                                  Pharmacy:           



                                                  [facility]           



                                                  [facility]           



                                                  [facility]           

 

     aspirin 81      2017-0      Yes                      81 mg = 1           [facility]

[facility]



     mg tablet,      1-24                               tab, PO,           l



     enteric      16:34:                               Daily, #           [facility]

n



     coated      00                                 90 tab, 3           



                                                  Refill(s)           

 

     aspirin 81      2017      Yes                      81 mg = 1           [facility]

[facility]



     mg tablet,      1-24                               tab, PO,           l



     enteric      16:34:                               Daily, #           [facility]

n



     coated      00                                 90 tab, 3           



                                                  Refill(s)           

 

     aspirin 81      2017-0      Yes                      81 mg = 1           [facility]

[facility]



     mg tablet,      1-24                               tab, PO,           l



     enteric      16:34:                               Daily, #           [facility]

n



     coated      00                                 90 tab, 3           



                                                  Refill(s)           

 

     aspirin 81      [address]      Yes                      81 mg = 1           [facility]

[facility]



     mg tablet,      1-24                               tab, PO,           l



     enteric      16:34:                               Daily, #           [facility]

n



     coated      00                                 90 tab, 3           



                                                  Refill(s)           

 

     Xopenex            No   Ghassan K                0.63 mg =           [facility]

[facility]



     0.63 mg/3      3-11           [facility]                3 mL,           l



     mL        01:00:                               Soln, NEB,           [place]



     inhalation      00                                 Once,           



     solution                                         first dose           



                                                  03/10/16           



                                                  19:00:00           



                                                  CST, stop           



                                                  date           



                                                  03/10/16           



                                                  19:00:00           



                                                  CST            

 

     Xopenex      0      No   Ghassan K                0.63 mg =           [facility]

[facility]



     0.63 mg/3      3-11           [facility]                3 mL,           l



     mL        01:00:                               Soln, NEB,           [facility]



     inhalation      00                                 Once,           



     solution                                         first dose           



                                                  03/10/16           



                                                  19:00:00           



                                                  CST, stop           



                                                  date           



                                                  03/10/16           



                                                  19:00:00           



                                                  CST            

 

     Xopenex      0      No   Ghassan K                0.63 mg =           [facility]

[facility]



     0.63 mg/3      3-11           [place]                3 mL,           l



     mL        01:00:                               Soln, NEB,           [place]



     inhalation      00                                 Once,           



     solution                                         first dose           



                                                  03/10/16           



                                                  19:00:00           



                                                  CST, stop           



                                                  date           



                                                  03/10/16           



                                                  19:00:00           



                                                  CST            

 

     Xopenex      -      No   Ghassan K                0.63 mg =           [facility]

[facility]



     0.63 mg/3      3-11           Chun                3 mL,           l



     mL        01:00:                               Soln, NEB,           [facility]



     inhalation      00                                 Once,           



     solution                                         first dose           



                                                  03/10/16           



                                                  19:00:00           



                                                  CST, stop           



                                                  date           



                                                  03/10/16           



                                                  19:00:00           



                                                  CST            

 

     Misc            No   Deuce                300 mL,           [facility]



     Medication      3-11           Wheat                Soln-IV,           l



               00:03:                               IV, Once,           [facility]



               00                                 first dose           



                                                  03/10/16           



                                                  18:03:00           



                                                  CST, stop           



                                                  date           



                                                  03/10/16           



                                                  18:03:00           



                                                  CST            

 

     Misc            No   Deuce                300 mL,           [facility]



     Medication      3-11           Wheat                Soln-IV,           l



               00:03:                               IV, Once,           [place]



                                                first dose           



                                                  03/10/16           



                                                  18:03:00           



                                                  CST, stop           



                                                  date           



                                                  03/10/16           



                                                  18:03:00           



                                                  CST            

 

     Misc            No   Deuce                300 mL,           [facility]



     Medication      3-11           Wheat                Soln-IV,           l



               00:03:                               IV, Once,           [place]



               00                                 first dose           



                                                  03/10/16           



                                                  18:03:00           



                                                  CST, stop           



                                                  date           



                                                  03/10/16           



                                                  18:03:00           



                                                  CST            

 

     Misc            No   Deuce                300 mL,           [facility]



     Medication      3-11           Wheat                Soln-IV,           l



               00:03:                               IV, Once,           [place]



                                                first dose           



                                                  03/10/16           



                                                  18:03:00           



                                                  CST, stop           



                                                  date           



                                                  03/10/16           



                                                  18:03:00           



                                                  CST            

 

     promethazin            No   Ghassan K                12.5 mg =          

 [facility]



     e         3-11           Chun                0.5 mL,           l



               00:02:                               Injection,           [place]



               00                                 IM, Once           



                                                  PRN for           



                                                  severe           



                                                  nausea,           



                                                  first dose           



                                                  03/10/16           



                                                  18:02:00           



                                                  CST            

 

     albuterol            No   Ghassan K                2.5 mg = 3           

[facility]



     2.5 mg/3 mL      3-11           Chun                mL, Soln,           

l



     (0.083%)      00:02:                               NEB, Once           [facility]

[facility]



     inhalation      00                                 PRN for           



     solution                                         wheezing,           



                                                  first dose           



                                                  03/10/16           



                                                  18:02:00           



                                                  CST            

 

     Demerol HCl            No   Ghassan K                12.5 mg =          

 [facility]



               3-11           Chun                0.25 mL,           l



               00:02:                               Injection,           [place]



               00                                 IV Push,           



                                                  Once PRN           



                                                  for            



                                                  shivers,           



                                                  first dose           



                                                  03/10/16           



                                                  18:02:00           



                                                  CST            

 

     ondansetron            No   Ghassan K                4 mg = 2           

[facility]



               3-11           Chun                mL,            l



               00:02:                               Injection,           [place]



               00                                 IV Push,           



                                                  q15min PRN           



                                                  for            



                                                  nausea/vom           



                                                  iting,           



                                                  order           



                                                  duration:           



                                                  2 doses,           



                                                  first dose           



                                                  03/10/16           



                                                  18:02:00           



                                                  CST, stop           



                                                  date           



                                                  Limited #           



                                                  of times           

 

     Dilaudid            No   Ghassan K                0.5 mg =           [facility]

[facility]



               3-11           Chun                0.25 mL,           l



               00:02:                               Injection,           [place]



               00                                 IV Push,           



                                                  q10min PRN           



                                                  for pain           



                                                  severe           



                                                  (7-10),           



                                                  first dose           



                                                  03/10/16           



                                                  18:02:00           



                                                  CST            

 

     diphenhydrA            No   Ghassan K                25 mg =           [facility]

[facility]



     MINE      3-11           Chun                0.5 mL,           l



               00:02:                               Injection,           [place]



               00                                 IV Push,           



                                                  Once PRN           



                                                  for            



                                                  itching,           



                                                  first dose           



                                                  03/10/16           



                                                  18:02:00           



                                                  CST            

 

     LR 1,000 mL            No   Ghassan K                1,000 mL,          

 [facility]



               3-11           Chun                IV, 75           l



               00:02:                               mL/hr,           [place]



               00                                 start date           



                                                  03/10/16           



                                                  18:02:00           



                                                  CST            

 

     Saline Lock            No   Ghassan K                10 mL,           [facility]

[facility]



     Flush      3-11           Chun                Soln, IV           l



               00:02:                               Push, As           [facility]



               00                                 Indicated           



                                                  PRN for           



                                                  flush,           



                                                  first dose           



                                                  03/10/16           



                                                  18:02:00           



                                                  CST            

 

     Bupivacaine            No   Ghassan K                300 mL,           [facility]

[facility]



     0.25% 300      3-11           Chun                Nerve           l



     mL pump 300      00:02:                               Block, 5           [facility]

[facility]



     mL        00                                 mL/hr,           



                                                  start date           



                                                  03/10/16           



                                                  18:02:00           



                                                  CST            

 

     promethazin            No   Ghassan K                12.5 mg =          

 [facility]



     e         3-11           Chun                0.5 mL,           l



               00:02:                               Injection,           [place]



               00                                 IM, Once           



                                                  PRN for           



                                                  severe           



                                                  nausea,           



                                                  first dose           



                                                  03/10/16           



                                                  18:02:00           



                                                  CST            

 

     albuterol            No   Ghassan K                2.5 mg = 3           

[facility]



     2.5 mg/3 mL      3-11           Chun                mL, Soln,           

l



     (0.083%)      00:02:                               NEB, Once           [facility]

[facility]



     inhalation      00                                 PRN for           



     solution                                         wheezing,           



                                                  first dose           



                                                  03/10/16           



                                                  18:02:00           



                                                  CST            

 

     Demerol HCl            No   Ghassan K                12.5 mg =          

 [facility]



               3-11           Chun                0.25 mL,           l



               00:02:                               Injection,           [place]



               00                                 IV Push,           



                                                  Once PRN           



                                                  for            



                                                  shivers,           



                                                  first dose           



                                                  03/10/16           



                                                  18:02:00           



                                                  CST            

 

     ondansetron            No   Ghassan K                4 mg = 2           

[facility]



               3-11           Chun                mL,            l



               00:02:                               Injection,           [place]



               00                                 IV Push,           



                                                  q15min PRN           



                                                  for            



                                                  nausea/vom           



                                                  iting,           



                                                  order           



                                                  duration:           



                                                  2 doses,           



                                                  first dose           



                                                  03/10/16           



                                                  18:02:00           



                                                  CST, stop           



                                                  date           



                                                  Limited #           



                                                  of times           

 

     Dilaudid            No   Ghassan K                0.5 mg =           [facility]

[facility]



               3-11           Chun                0.25 mL,           l



               00:02:                               Injection,           [place]



               00                                 IV Push,           



                                                  q10min PRN           



                                                  for pain           



                                                  severe           



                                                  (7-10),           



                                                  first dose           



                                                  03/10/16           



                                                  18:02:00           



                                                  CST            

 

     diphenhydrA            No   Ghassan K                25 mg =           [facility]

[facility]



     MINE      3-11           Chun                0.5 mL,           l



               00:02:                               Injection,           [place]



               00                                 IV Push,           



                                                  Once PRN           



                                                  for            



                                                  itching,           



                                                  first dose           



                                                  03/10/16           



                                                  18:02:00           



                                                  CST            

 

     LR 1,000 mL            No   Ghassan K                1,000 mL,          

 [facility]



               3-11           Chun                IV, 75           l



               00:02:                               mL/hr,           [place]



               00                                 start date           



                                                  03/10/16           



                                                  18:02:00           



                                                  CST            

 

     Saline Lock            No   Ghassan K                10 mL,           [facility]

[facility]



     Flush      3-11           Chun                Soln, IV           l



               00:02:                               Push, As           [place]



               00                                 Indicated           



                                                  PRN for           



                                                  flush,           



                                                  first dose           



                                                  03/10/16           



                                                  18:02:00           



                                                  CST            

 

     Bupivacaine            No   Ghassan K                300 mL,           [facility]

[facility]



     0.25% 300      3-11           Chun                Nerve           l



     mL pump 300      00:02:                               Block, 5           [facility]

[facility]



     mL        00                                 mL/hr,           



                                                  start date           



                                                  03/10/16           



                                                  18:02:00           



                                                  CST            

 

     promethazin            No   Ghassan K                12.5 mg =          

 [facility]



     e         3-11           Chun                0.5 mL,           l



               00:02:                               Injection,           [place]



               00                                 IM, Once           



                                                  PRN for           



                                                  severe           



                                                  nausea,           



                                                  first dose           



                                                  03/10/16           



                                                  18:02:00           



                                                  CST            

 

     albuterol            No   Ghassan K                2.5 mg = 3           

[facility]



     2.5 mg/3 mL      3-11           Chun                mL, Soln,           

l



     (0.083%)      00:02:                               NEB, Once           [facility]

[facility]



     inhalation      00                                 PRN for           



     solution                                         wheezing,           



                                                  first dose           



                                                  03/10/16           



                                                  18:02:00           



                                                  CST            

 

     Demerol HCl            No   Ghassan K                12.5 mg =          

 [facility]



               3-11           Chun                0.25 mL,           l



               00:02:                               Injection,           [place]



               00                                 IV Push,           



                                                  Once PRN           



                                                  for            



                                                  shivers,           



                                                  first dose           



                                                  03/10/16           



                                                  18:02:00           



                                                  CST            

 

     ondansetron            No   Ghassan K                4 mg = 2           

[facility]



               3-11           Chun                mL,            l



               00:02:                               Injection,           [place]



               00                                 IV Push,           



                                                  q15min PRN           



                                                  for            



                                                  nausea/vom           



                                                  iting,           



                                                  order           



                                                  duration:           



                                                  2 doses,           



                                                  first dose           



                                                  03/10/16           



                                                  18:02:00           



                                                  CST, stop           



                                                  date           



                                                  Limited #           



                                                  of times           

 

     Dilaudid            No   Ghassan K                0.5 mg =           [facility]

[facility]



               3-11           Chun                0.25 mL,           l



               00:02:                               Injection,           [place]



               00                                 IV Push,           



                                                  q10min PRN           



                                                  for pain           



                                                  severe           



                                                  (7-10),           



                                                  first dose           



                                                  03/10/16           



                                                  18:02:00           



                                                  CST            

 

     diphenhydrA            No   Ghassan K                25 mg =           [facility]

[facility]



     MINE      3-11           Chun                0.5 mL,           l



               00:02:                               Injection,           [place]



               00                                 IV Push,           



                                                  Once PRN           



                                                  for            



                                                  itching,           



                                                  first dose           



                                                  03/10/16           



                                                  18:02:00           



                                                  CST            

 

     LR 1,000 mL            No   Ghassan K                1,000 mL,          

 [facility]



               3-11           Chun                IV, 75           l



               00:02:                               mL/hr,           [place]



               00                                 start date           



                                                  03/10/16           



                                                  18:02:00           



                                                  CST            

 

     Saline Lock            No   Ghassan K                10 mL,           Me

[facility]



     Flush      3-11           Chun                Soln, IV           l



               00:02:                               Push, As           [facility]



               00                                 Indicated           



                                                  PRN for           



                                                  flush,           



                                                  first dose           



                                                  03/10/16           



                                                  18:02:00           



                                                  CST            

 

     Bupivacaine            No   Ghassan K                300 mL,           M

[facility]



     0.25% 300      3-11           Chun                Nerve           l



     mL pump 300      00:02:                               Block, 5           He

[facility]



     mL        00                                 mL/hr,           



                                                  start date           



                                                  03/10/16           



                                                  18:02:00           



                                                  CST            

 

     promethazin            No   Ghassan K                12.5 mg =          

 [facility]



     e         3-11           Chun                0.5 mL,           l



               00:02:                               Injection,           [facility]



               00                                 IM, Once           



                                                  PRN for           



                                                  severe           



                                                  nausea,           



                                                  first dose           



                                                  03/10/16           



                                                  18:02:00           



                                                  CST            

 

     albuterol            No   Ghassan K                2.5 mg = 3           

[facility]



     2.5 mg/3 mL      3-11           Chun                mL, Soln,           

l



     (0.083%)      00:02:                               NEB, Once           [facility]

daryl



     inhalation      00                                 PRN for           



     solution                                         wheezing,           



                                                  first dose           



                                                  03/10/16           



                                                  18:02:00           



                                                  CST            

 

     Demerol HCl            No   Ghassan K                12.5 mg =          

 [facility]



               3-11           Chun                0.25 mL,           l



               00:02:                               Injection,           [place]



               00                                 IV Push,           



                                                  Once PRN           



                                                  for            



                                                  shivers,           



                                                  first dose           



                                                  03/10/16           



                                                  18:02:00           



                                                  CST            

 

     ondansetron            No   Ghassan K                4 mg = 2           

[facility]



               3-11           Chun                mL,            l



               00:02:                               Injection,           [place]



               00                                 IV Push,           



                                                  q15min PRN           



                                                  for            



                                                  nausea/vom           



                                                  iting,           



                                                  order           



                                                  duration:           



                                                  2 doses,           



                                                  first dose           



                                                  03/10/16           



                                                  18:02:00           



                                                  CST, stop           



                                                  date           



                                                  Limited #           



                                                  of times           

 

     Dilaudid            No   Ghassan K                0.5 mg =           [facility]

[facility]



               3-11           Chun                0.25 mL,           l



               00:02:                               Injection,           [place]



               00                                 IV Push,           



                                                  q10min PRN           



                                                  for pain           



                                                  severe           



                                                  (7-10),           



                                                  first dose           



                                                  03/10/16           



                                                  18:02:00           



                                                  CST            

 

     diphenhydrA            No   Ghassan K                25 mg =           M

[facility]



     MINE      3-11           Chun                0.5 mL,           l



               00:02:                               Injection,           [place]



               00                                 IV Push,           



                                                  Once PRN           



                                                  for            



                                                  itching,           



                                                  first dose           



                                                  03/10/16           



                                                  18:02:00           



                                                  CST            

 

     LR 1,000 mL            No   Ghassan K                1,000 mL,          

 [facility]



               3-11           Chun                IV, 75           l



               00:02:                               mL/hr,           [place]



               00                                 start date           



                                                  03/10/16           



                                                  18:02:00           



                                                  CST            

 

     Saline Lock            No   Ghassan K                10 mL,           Me

[facility]



     Flush      3-11           Chun                Soln, IV           l



               00:02:                               Push, As           [place]



               00                                 Indicated           



                                                  PRN for           



                                                  flush,           



                                                  first dose           



                                                  03/10/16           



                                                  18:02:00           



                                                  CST            

 

     Bupivacaine      -      No   Ghassan K                300 mL,           M

[facility]



     0.25% 300      3-11           Chun                Nerve           l



     mL pump 300      00:02:                               Block, 5           He

[facility]



     mL        00                                 mL/hr,           



                                                  start date           



                                                  03/10/16           



                                                  18:02:00           



                                                  CST            

 

     Misc            No   Deuce                1,000 mL,           [facility]

a



     Medication      3-10           [facility]                Soln-IV,           l



               23:42:                               IV, Once,           [facility]



                                                first dose           



                                                  03/10/16           



                                                  17:42:00           



                                                  CST, stop           



                                                  date           



                                                  03/10/16           



                                                  17:42:00           



                                                  CST            

 

     Misc            No   Deuce                1,000 mL,           [facility]

a



     Medication      3-10           [place]                Soln-IV,           l



               23:42:                               IV, Once,           [facility]



                                                first dose           



                                                  03/10/16           



                                                  17:42:00           



                                                  CST, stop           



                                                  date           



                                                  03/10/16           



                                                  17:42:00           



                                                  CST            

 

     Misc            No   Deuce                1,000 mL,           [facility]

a



     Medication      3-10           [place]                Soln-IV,           l



               23:42:                               IV, Once,           [facility]



                                                first dose           



                                                  03/10/16           



                                                  17:42:00           



                                                  CST, stop           



                                                  date           



                                                  03/10/16           



                                                  17:42:00           



                                                  CST            

 

     Misc            No   Deuce                1,000 mL,           [facility]

a



     Medication      3-10           [place]                Soln-IV,           l



               23:42:                               IV, Once,           [place]



                                                first dose           



                                                  03/10/16           



                                                  17:42:00           



                                                  CST, stop           



                                                  date           



                                                  03/10/16           



                                                  17:42:00           



                                                  CST            

 

     fentaNYL            No   Deuce                25 mcg =           [facility]

[facility]



               3-10           [place]                0.5 mL,           l



               23:20:                               Injection,           [facility]



               00                                 IV, Once,           



                                                  first dose           



                                                  03/10/16           



                                                  17:20:00           



                                                  CST, stop           



                                                  date           



                                                  03/10/16           



                                                  17:20:00           



                                                  CST            

 

     fentaNYL            No   Deuce                25 mcg =           [facility]

[facility]



               3-10           [place]                0.5 mL,           l



               23:20:                               Injection,           [place]



               00                                 IV, Once,           



                                                  first dose           



                                                  03/10/16           



                                                  17:20:00           



                                                  CST, stop           



                                                  date           



                                                  03/10/16           



                                                  17:20:00           



                                                  CST            

 

     fentaNYL            No   Deuce                25 mcg =           [facility]

[facility]



               3-10           [place]                0.5 mL,           l



               23:20:                               Injection,           [place]



               00                                 IV, Once,           



                                                  first dose           



                                                  03/10/16           



                                                  17:20:00           



                                                  CST, stop           



                                                  date           



                                                  03/10/16           



                                                  17:20:00           



                                                  CST            

 

     fentaNYL      2016-0      No   Deuce                25 mcg =           [facility]

[facility]



               3-10           [place]                0.5 mL,           l



               23:20:                               Injection,           [place]



               00                                 IV, Once,           



                                                  first dose           



                                                  03/10/16           



                                                  17:20:00           



                                                  CST, stop           



                                                  date           



                                                  03/10/16           



                                                  17:20:00           



                                                  CST            

 

     ondansetron      -0      No   [facility]                4 mg = 2           

[facility]



               3-10           [place]                mL,            l



               23:03:                               Injection,           [place]



               00                                 IV, Once,           



                                                  first dose           



                                                  03/10/16           



                                                  17:03:00           



                                                  CST, stop           



                                                  date           



                                                  03/10/16           



                                                  17:03:00           



                                                  CST            

 

     ondansetron      -0      No   Deuce                4 mg = 2           

[facility]



               3-10           [place]                mL,            l



               23:03:                               Injection,           [facility]



               00                                 IV, Once,           



                                                  first dose           



                                                  03/10/16           



                                                  17:03:00           



                                                  CST, stop           



                                                  date           



                                                  03/10/16           



                                                  17:03:00           



                                                  CST            

 

     ondansetron      -0      No   Deuce                4 mg = 2           

[facility]



               3-10           [place]                mL,            l



               23:03:                               Injection,           [place]



               00                                 IV, Once,           



                                                  first dose           



                                                  03/10/16           



                                                  17:03:00           



                                                  CST, stop           



                                                  date           



                                                  03/10/16           



                                                  17:03:00           



                                                  CST            

 

     ondansetron      -0      No   Deuce                4 mg = 2           

[facility]



               3-10           [place]                mL,            l



               23:03:                               Injection,           [place]



               00                                 IV, Once,           



                                                  first dose           



                                                  03/10/16           



                                                  17:03:00           



                                                  CST, stop           



                                                  date           



                                                  03/10/16           



                                                  17:03:00           



                                                  CST            

 

     fentaNYL      -0      No   Deuce                25 mcg =           [facility]

[facility]



               3-10           [place]                0.5 mL,           l



               23:00:                               Injection,           [facility]



               00                                 IV, Once,           



                                                  first dose           



                                                  03/10/16           



                                                  17:00:00           



                                                  CST, stop           



                                                  date           



                                                  03/10/16           



                                                  17:00:00           



                                                  CST            

 

     fentaNYL      -0      No   Deuce                25 mcg =           [facility]

[facility]



               3-10           [place]                0.5 mL,           l



               23:00:                               Injection,           [place]



               00                                 IV, Once,           



                                                  first dose           



                                                  03/10/16           



                                                  17:00:00           



                                                  CST, stop           



                                                  date           



                                                  03/10/16           



                                                  17:00:00           



                                                  CST            

 

     fentaNYL      -0      No   Deuce                25 mcg =           [facility]

[facility]



               3-10           [place]                0.5 mL,           l



               23:00:                               Injection,           [place]



               00                                 IV, Once,           



                                                  first dose           



                                                  03/10/16           



                                                  17:00:00           



                                                  CST, stop           



                                                  date           



                                                  03/10/16           



                                                  17:00:00           



                                                  CST            

 

     fentaNYL      -0      No   Deuce                25 mcg =           [facility]

[facility]



               3-10           [place]                0.5 mL,           l



               23:00:                               Injection,           [place]



               00                                 IV, Once,           



                                                  first dose           



                                                  03/10/16           



                                                  17:00:00           



                                                  CST, stop           



                                                  date           



                                                  03/10/16           



                                                  17:00:00           



                                                  CST            

 

     fentaNYL      -0      No   Deuce                25 mcg =           [facility]

[facility]



               3-10           [place]                0.5 mL,           l



               22:48:                               Injection,           [place]



               00                                 IV, Once,           



                                                  first dose           



                                                  03/10/16           



                                                  16:48:00           



                                                  CST, stop           



                                                  date           



                                                  03/10/16           



                                                  16:48:00           



                                                  CST            

 

     fentaNYL      -0      No   Deuce                25 mcg =           [facility]

[facility]



               3-10           [place]                0.5 mL,           l



               22:48:                               Injection,           [facility]



               00                                 IV, Once,           



                                                  first dose           



                                                  03/10/16           



                                                  16:48:00           



                                                  CST, stop           



                                                  date           



                                                  03/10/16           



                                                  16:48:00           



                                                  CST            

 

     fentaNYL      -      No   Deuce                25 mcg =           [facility]

[facility]



               3-10           [facility]                0.5 mL,           l



               22:48:                               Injection,           [place]



               00                                 IV, Once,           



                                                  first dose           



                                                  03/10/16           



                                                  16:48:00           



                                                  CST, stop           



                                                  date           



                                                  03/10/16           



                                                  16:48:00           



                                                  CST            

 

     fentaNYL      -0      No   Deuce                25 mcg =           [facility]

[facility]



               3-10           [facility]                0.5 mL,           l



               22:48:                               Injection,           [place]



               00                                 IV, Once,           



                                                  first dose           



                                                  03/10/16           



                                                  16:48:00           



                                                  CST, stop           



                                                  date           



                                                  03/10/16           



                                                  16:48:00           



                                                  CST            

 

     fentaNYL      -0      No   Deuce                25 mcg =           [facility]

[facility]



               3-10           [facility]                0.5 mL,           l



               22:37:                               Injection,           [place]



               00                                 IV, Once,           



                                                  first dose           



                                                  03/10/16           



                                                  16:37:00           



                                                  CST, stop           



                                                  date           



                                                  03/10/16           



                                                  16:37:00           



                                                  CST            

 

     fentaNYL      -0      No   Deuce                25 mcg =           [facility]

[facility]



               3-10           [facility]                0.5 mL,           l



               22:37:                               Injection,           [place]



               00                                 IV, Once,           



                                                  first dose           



                                                  03/10/16           



                                                  16:37:00           



                                                  CST, stop           



                                                  date           



                                                  03/10/16           



                                                  16:37:00           



                                                  CST            

 

     fentaNYL      -0      No   Deuce                25 mcg =           [facility]

[facility]



               3-10           [facility]                0.5 mL,           l



               22:37:                               Injection,           [place]



               00                                 IV, Once,           



                                                  first dose           



                                                  03/10/16           



                                                  16:37:00           



                                                  CST, stop           



                                                  date           



                                                  03/10/16           



                                                  16:37:00           



                                                  CST            

 

     fentaNYL      2016-0      No   Deuce                25 mcg =           [facility]

[facility]



               3-10           [facility]                0.5 mL,           l



               22:37:                               Injection,           [place]



               00                                 IV, Once,           



                                                  first dose           



                                                  03/10/16           



                                                  16:37:00           



                                                  CST, stop           



                                                  date           



                                                  03/10/16           



                                                  16:37:00           



                                                  CST            

 

     dexamethaso      2016-0      No   [facility]                8 mg = 2           

[facility]



     ne        3-10           [facility]                mL,            l



               22:23:                               Injection,           [place]



               00                                 IV, Once,           



                                                  first dose           



                                                  03/10/16           



                                                  16:23:00           



                                                  CST, stop           



                                                  date           



                                                  03/10/16           



                                                  16:23:00           



                                                  CST            

 

     dexamethaso      -0      No   [facility]uce                8 mg = 2           

[facility]



     ne        3-10           [place]                mL,            l



               22:23:                               Injection,           [place]



               00                                 IV, Once,           



                                                  first dose           



                                                  03/10/16           



                                                  16:23:00           



                                                  CST, stop           



                                                  date           



                                                  03/10/16           



                                                  16:23:00           



                                                  CST            

 

     dexamethaso      -0      No   [facility]                8 mg = 2           

[facility]



     ne        3-10           [place]                mL,            l



               22:23:                               Injection,           [place]



               00                                 IV, Once,           



                                                  first dose           



                                                  03/10/16           



                                                  16:23:00           



                                                  CST, stop           



                                                  date           



                                                  03/10/16           



                                                  16:23:00           



                                                  CST            

 

     dexamethaso      -0      No   [facility]uce                8 mg = 2           

[facility]



     ne        3-10           [place]                mL,            l



               22:23:                               Injection,           [place]



               00                                 IV, Once,           



                                                  first dose           



                                                  03/10/16           



                                                  16:23:00           



                                                  CST, stop           



                                                  date           



                                                  03/10/16           



                                                  16:23:00           



                                                  CST            

 

     Misc      -0      No   Deuce                1,000 mL,           [facility]

a



     Medication      3-10           [facility]                Soln-IV,           l



               22:21:                               IV, Once,           [place]



               00                                 first dose           



                                                  03/10/16           



                                                  16:21:00           



                                                  CST, stop           



                                                  date           



                                                  03/10/16           



                                                  16:21:00           



                                                  CST            

 

     Misc      -0      No   Deuce                1,000 mL,           [facility]

a



     Medication      3-10           [facility]                Soln-IV,           l



               22:21:                               IV, Once,           [place]



               00                                 first dose           



                                                  03/10/16           



                                                  16:21:00           



                                                  CST, stop           



                                                  date           



                                                  03/10/16           



                                                  16:21:00           



                                                  CST            

 

     Misc      0      No   Deuce                1,000 mL,           [facility]

a



     Medication      3-10           [facility]                Soln-IV,           l



               22:21:                               IV, Once,           [facility]



               00                                 first dose           



                                                  03/10/16           



                                                  16:21:00           



                                                  CST, stop           



                                                  date           



                                                  03/10/16           



                                                  16:21:00           



                                                  CST            

 

     Misc      0      No   Deuce                1,000 mL,           [facility]

a



     Medication      3-10           [facility]                Soln-IV,           l



               22:21:                               IV, Once,           [place]



               00                                 first dose           



                                                  03/10/16           



                                                  16:21:00           



                                                  CST, stop           



                                                  date           



                                                  03/10/16           



                                                  16:21:00           



                                                  CST            

 

     clindamycin            Yes  Deuce                928.125           [facility]

[facility]



               3-10           [place]                mg,            l



               22:18:                               Soln-IV,           [place]



               00                                 IV, Once,           



                                                  first dose           



                                                  03/10/16           



                                                  16:18:00           



                                                  CST, stop           



                                                  date           



                                                  03/10/16           



                                                  16:18:00           



                                                  CST            

 

     clindamycin      0      Yes  Deuce                928.125           [facility]

[facility]



               3-10           [place]                mg,            l



               22:18:                               Soln-IV,           [place]



               00                                 IV, Once,           



                                                  first dose           



                                                  03/10/16           



                                                  16:18:00           



                                                  CST, stop           



                                                  date           



                                                  03/10/16           



                                                  16:18:00           



                                                  CST            

 

     clindamycin      0      Yes  Deuce                928.125           [facility]

[facility]



               3-10           [place]                mg,            l



               22:18:                               Soln-IV,           [place]



               00                                 IV, Once,           



                                                  first dose           



                                                  03/10/16           



                                                  16:18:00           



                                                  CST, stop           



                                                  date           



                                                  03/10/16           



                                                  16:18:00           



                                                  CST            

 

     clindamycin      0      Yes  Deuce                928.125           [facility]

[facility]



               3-10           [place]                mg,            l



               22:18:                               Soln-IV,           [place]



               00                                 IV, Once,           



                                                  first dose           



                                                  03/10/16           



                                                  16:18:00           



                                                  CST, stop           



                                                  date           



                                                  03/10/16           



                                                  16:18:00           



                                                  CST            

 

     fentaNYL      -0      No   Deuce                25 mcg =           [facility]

[facility]



               3-10           [facility]                0.5 mL,           l



               22:05:                               Injection,           [place]



               00                                 IV, Once,           



                                                  first dose           



                                                  03/10/16           



                                                  16:05:00           



                                                  CST, stop           



                                                  date           



                                                  03/10/16           



                                                  16:05:00           



                                                  CST            

 

     midazolam            No   Deuce                0.5 mg =           [facility]

[facility]



               3-10           [place]                0.5 mL,           l



               22:05:                               Injection,           [place]



               00                                 IV, Once,           



                                                  first dose           



                                                  03/10/16           



                                                  16:05:00           



                                                  CST, stop           



                                                  date           



                                                  03/10/16           



                                                  16:05:00           



                                                  CST            

 

     fentaNYL      -0      No   Deuce                25 mcg =           [facility]

[facility]



               3-10           [place]                0.5 mL,           l



               22:05:                               Injection,           [place]



               00                                 IV, Once,           



                                                  first dose           



                                                  03/10/16           



                                                  16:05:00           



                                                  CST, stop           



                                                  date           



                                                  03/10/16           



                                                  16:05:00           



                                                  CST            

 

     midazolam      -0      No   [facility]uce                0.5 mg =           [facility]

[facility]



               3-10           [place]                0.5 mL,           l



               22:05:                               Injection,           [place]



               00                                 IV, Once,           



                                                  first dose           



                                                  03/10/16           



                                                  16:05:00           



                                                  CST, stop           



                                                  date           



                                                  03/10/16           



                                                  16:05:00           



                                                  CST            

 

     fentaNYL      -      No   Deuce                25 mcg =           [facility]

[facility]



               3-10           [facility]                0.5 mL,           l



               22:05:                               Injection,           [place]



               00                                 IV, Once,           



                                                  first dose           



                                                  03/10/16           



                                                  16:05:00           



                                                  CST, stop           



                                                  date           



                                                  03/10/16           



                                                  16:05:00           



                                                  CST            

 

     midazolam      -      No   Deuce                0.5 mg =           [facility]

[facility]



               3-10           [place]                0.5 mL,           l



               22:05:                               Injection,           [place]



               00                                 IV, Once,           



                                                  first dose           



                                                  03/10/16           



                                                  16:05:00           



                                                  CST, stop           



                                                  date           



                                                  03/10/16           



                                                  16:05:00           



                                                  CST            

 

     fentaNYL      -      No   Deuce                25 mcg =           [facility]

[facility]



               3-10           [place]                0.5 mL,           l



               22:05:                               Injection,           [place]



               00                                 IV, Once,           



                                                  first dose           



                                                  03/10/16           



                                                  16:05:00           



                                                  CST, stop           



                                                  date           



                                                  03/10/16           



                                                  16:05:00           



                                                  CST            

 

     midazolam            No   Deuce                0.5 mg =           [facility]

[facility]



               3-10           [place]                0.5 mL,           l



               22:05:                               Injection,           [place]



               00                                 IV, Once,           



                                                  first dose           



                                                  03/10/16           



                                                  16:05:00           



                                                  CST, stop           



                                                  date           



                                                  03/10/16           



                                                  16:05:00           



                                                  CST            

 

     lidocaine      -0      No   [facility]                3 mL,           [facility]

[facility]



               3-10           [facility]                Injection,           l



               21:58:                               IV, Once,           [facility]



               00                                 first dose           



                                                  03/10/16           



                                                  15:58:00           



                                                  CST, stop           



                                                  date           



                                                  03/10/16           



                                                  15:58:00           



                                                  CST            

 

     propofol      0      No   [facility]                120 mg =           [facility]

[facility]



               3-10           [facility]                12 mL,           l



               21:58:                               Emulsion,           [place]



               00                                 IV, Once,           



                                                  first dose           



                                                  03/10/16           



                                                  15:58:00           



                                                  CST, stop           



                                                  date           



                                                  03/10/16           



                                                  15:58:00           



                                                  CST            

 

     lidocaine            No   Deuce                3 mL,           [facility]

[facility]



               3-10           Wheat                Injection,           l



               21:58:                               IV, Once,           [facility]



               00                                 first dose           



                                                  03/10/16           



                                                  15:58:00           



                                                  CST, stop           



                                                  date           



                                                  03/10/16           



                                                  15:58:00           



                                                  CST            

 

     propofol            No   Deuce                120 mg =           [facility]

[facility]



               3-10           W[facility]                12 mL,           l



               21:58:                               Emulsion,           [place]



               00                                 IV, Once,           



                                                  first dose           



                                                  03/10/16           



                                                  15:58:00           



                                                  CST, stop           



                                                  date           



                                                  03/10/16           



                                                  15:58:00           



                                                  CST            

 

     lidocaine            No   Deuce                3 mL,           [facility]

[facility]



               3-10           Wheat                Injection,           l



               21:58:                               IV, Once,           [place]



               00                                 first dose           



                                                  03/10/16           



                                                  15:58:00           



                                                  CST, stop           



                                                  date           



                                                  03/10/16           



                                                  15:58:00           



                                                  CST            

 

     propofol            No   Deuce                120 mg =           [facility]

[facility]



               3-10           Wheat                12 mL,           l



               21:58:                               Emulsion,           [facility]



               00                                 IV, Once,           



                                                  first dose           



                                                  03/10/16           



                                                  15:58:00           



                                                  CST, stop           



                                                  date           



                                                  03/10/16           



                                                  15:58:00           



                                                  CST            

 

     lidocaine            No   Deuce                3 mL,           [facility]

[facility]



               3-10           Wheat                Injection,           l



               21:58:                               IV, Once,           [facility]



               00                                 first dose           



                                                  03/10/16           



                                                  15:58:00           



                                                  CST, stop           



                                                  date           



                                                  03/10/16           



                                                  15:58:00           



                                                  CST            

 

     propofol            No   Deuce                120 mg =           [facility]

[facility]



               3-10           Wheat                12 mL,           l



               21:58:                               Emulsion,           [place]



               00                                 IV, Once,           



                                                  first dose           



                                                  03/10/16           



                                                  15:58:00           



                                                  CST, stop           



                                                  date           



                                                  03/10/16           



                                                  15:58:00           



                                                  CST            

 

     midazolam            No   Deuce                0.5 mg =           [facility]

[facility]



               3-10           Wheat                0.5 mL,           l



               21:50:                               Injection,           [place]



               00                                 IV, Once,           



                                                  first dose           



                                                  03/10/16           



                                                  15:50:00           



                                                  CST, stop           



                                                  date           



                                                  03/10/16           



                                                  15:50:00           



                                                  CST            

 

     fentaNYL            No   Deuce                25 mcg =           [facility]

[facility]



               3-10           W[facility]                0.5 mL,           l



               21:50:                               Injection,           [place]



               00                                 IV, Once,           



                                                  first dose           



                                                  03/10/16           



                                                  15:50:00           



                                                  CST, stop           



                                                  date           



                                                  03/10/16           



                                                  15:50:00           



                                                  CST            

 

     midazolam      -0      No   Deuce                0.5 mg =           [facility]

[facility]



               3-10           Wheat                0.5 mL,           l



               21:50:                               Injection,           [facility]



               00                                 IV, Once,           



                                                  first dose           



                                                  03/10/16           



                                                  15:50:00           



                                                  CST, stop           



                                                  date           



                                                  03/10/16           



                                                  15:50:00           



                                                  CST            

 

     fentaNYL      -0      No   Deuce                25 mcg =           [facility]

[facility]



               3-10           Wheat                0.5 mL,           l



               21:50:                               Injection,           [place]



               00                                 IV, Once,           



                                                  first dose           



                                                  03/10/16           



                                                  15:50:00           



                                                  CST, stop           



                                                  date           



                                                  03/10/16           



                                                  15:50:00           



                                                  CST            

 

     midazolam      -0      No   Deuce                0.5 mg =           [facility]

[facility]



               3-10           Wheat                0.5 mL,           l



               21:50:                               Injection,           [facility]



               00                                 IV, Once,           



                                                  first dose           



                                                  03/10/16           



                                                  15:50:00           



                                                  CST, stop           



                                                  date           



                                                  03/10/16           



                                                  15:50:00           



                                                  CST            

 

     fentaNYL      -      No   Deuce                25 mcg =           [facility]

[facility]



               3-10           Wheat                0.5 mL,           l



               21:50:                               Injection,           [place]



               00                                 IV, Once,           



                                                  first dose           



                                                  03/10/16           



                                                  15:50:00           



                                                  CST, stop           



                                                  date           



                                                  03/10/16           



                                                  15:50:00           



                                                  CST            

 

     midazolam      -0      No   Deuce                0.5 mg =           [facility]

[facility]



               3-10           Wheat                0.5 mL,           l



               21:50:                               Injection,           [facility]



               00                                 IV, Once,           



                                                  first dose           



                                                  03/10/16           



                                                  15:50:00           



                                                  CST, stop           



                                                  date           



                                                  03/10/16           



                                                  15:50:00           



                                                  CST            

 

     fentaNYL      -0      No   Deuce                25 mcg =           [facility]

[facility]



               3-10           Wheat                0.5 mL,           l



               21:50:                               Injection,           [place]



               00                                 IV, Once,           



                                                  first dose           



                                                  03/10/16           



                                                  15:50:00           



                                                  CST, stop           



                                                  date           



                                                  03/10/16           



                                                  15:50:00           



                                                  CST            

 

     midazolam      -0      No   Deuce                0.5 mg =           [facility]

[facility]



               3-10           W[facility]                0.5 mL,           l



               21:10:                               Injection,           [facility]



               00                                 IV, Once,           



                                                  first dose           



                                                  03/10/16           



                                                  15:10:00           



                                                  CST, stop           



                                                  date           



                                                  03/10/16           



                                                  15:10:00           



                                                  CST            

 

     fentaNYL      -0      No   Deuce                25 mcg =           [facility]

[facility]



               3-10           [facility]                0.5 mL,           l



               21:10:                               Injection,           [facility]



               00                                 IV, Once,           



                                                  first dose           



                                                  03/10/16           



                                                  15:10:00           



                                                  CST, stop           



                                                  date           



                                                  03/10/16           



                                                  15:10:00           



                                                  CST            

 

     midazolam      2016-0      No   [facility]                0.5 mg =           [facility]

[facility]



               3-10           [facility]                0.5 mL,           l



               21:10:                               Injection,           [facility]



               00                                 IV, Once,           



                                                  first dose           



                                                  03/10/16           



                                                  15:10:00           



                                                  CST, stop           



                                                  date           



                                                  03/10/16           



                                                  15:10:00           



                                                  CST            

 

     fentaNYL      -0      No   Deuce                25 mcg =           [facility]

[facility]



               3-10           W[facility]                0.5 mL,           l



               21:10:                               Injection,           [facility]



               00                                 IV, Once,           



                                                  first dose           



                                                  03/10/16           



                                                  15:10:00           



                                                  CST, stop           



                                                  date           



                                                  03/10/16           



                                                  15:10:00           



                                                  CST            

 

     midazolam      -0      No   Deuce                0.5 mg =           [facility]

[facility]



               3-10           [facility]                0.5 mL,           l



               21:10:                               Injection,           [place]



               00                                 IV, Once,           



                                                  first dose           



                                                  03/10/16           



                                                  15:10:00           



                                                  CST, stop           



                                                  date           



                                                  03/10/16           



                                                  15:10:00           



                                                  CST            

 

     fentaNYL      -0      No   Deuce                25 mcg =           [facility]

[facility]



               3-10           [facility]                0.5 mL,           l



               21:10:                               Injection,           [place]



               00                                 IV, Once,           



                                                  first dose           



                                                  03/10/16           



                                                  15:10:00           



                                                  CST, stop           



                                                  date           



                                                  03/10/16           



                                                  15:10:00           



                                                  CST            

 

     midazolam      -0      No   Deuce                0.5 mg =           [facility]

[facility]



               3-10           W[facility]                0.5 mL,           l



               21:10:                               Injection,           [facility]



               00                                 IV, Once,           



                                                  first dose           



                                                  03/10/16           



                                                  15:10:00           



                                                  CST, stop           



                                                  date           



                                                  03/10/16           



                                                  15:10:00           



                                                  CST            

 

     fentaNYL      -0      No   Deuce                25 mcg =           [facility]

[facility]



               3-10           W[facility]                0.5 mL,           l



               21:10:                               Injection,           [facility]



               00                                 IV, Once,           



                                                  first dose           



                                                  03/10/16           



                                                  15:10:00           



                                                  CST, stop           



                                                  date           



                                                  03/10/16           



                                                  15:10:00           



                                                  CST            

 

     fentaNYL      2016-0      No   Deuce                25 mcg =           [facility]

[facility]



               3-10           [facility]                0.5 mL,           l



               21:05:                               Injection,           [place]



               00                                 IV, Once,           



                                                  first dose           



                                                  03/10/16           



                                                  15:05:00           



                                                  CST, stop           



                                                  date           



                                                  03/10/16           



                                                  15:05:00           



                                                  CST            

 

     midazolam      0      No   Deuce                0.5 mg =           [facility]

[facility]



               3-10           Wheat                0.5 mL,           l



               21:05:                               Injection,           [place]



               00                                 IV, Once,           



                                                  first dose           



                                                  03/10/16           



                                                  15:05:00           



                                                  CST, stop           



                                                  date           



                                                  03/10/16           



                                                  15:05:00           



                                                  CST            

 

     fentaNYL      -0      No   Deuce                25 mcg =           [facility]

[facility]



               3-10           W[facility]                0.5 mL,           l



               21:05:                               Injection,           [place]



               00                                 IV, Once,           



                                                  first dose           



                                                  03/10/16           



                                                  15:05:00           



                                                  CST, stop           



                                                  date           



                                                  03/10/16           



                                                  15:05:00           



                                                  CST            

 

     midazolam            No   Deuce                0.5 mg =           [facility]

[facility]



               3-10           W[facility]                0.5 mL,           l



               21:05:                               Injection,           [facility]



               00                                 IV, Once,           



                                                  first dose           



                                                  03/10/16           



                                                  15:05:00           



                                                  CST, stop           



                                                  date           



                                                  03/10/16           



                                                  15:05:00           



                                                  CST            

 

     fentaNYL      -      No   Deuce                25 mcg =           [facility]

[facility]



               3-10           [facility]                0.5 mL,           l



               21:05:                               Injection,           [place]



               00                                 IV, Once,           



                                                  first dose           



                                                  03/10/16           



                                                  15:05:00           



                                                  CST, stop           



                                                  date           



                                                  03/10/16           



                                                  15:05:00           



                                                  CST            

 

     midazolam            No   Deuce                0.5 mg =           [facility]

[facility]



               3-10           Wheat                0.5 mL,           l



               21:05:                               Injection,           [place]



               00                                 IV, Once,           



                                                  first dose           



                                                  03/10/16           



                                                  15:05:00           



                                                  CST, stop           



                                                  date           



                                                  03/10/16           



                                                  15:05:00           



                                                  CST            

 

     fentaNYL      -0      No   Deuce                25 mcg =           [facility]

[facility]



               3-10           Wheat                0.5 mL,           l



               21:05:                               Injection,           [place]



               00                                 IV, Once,           



                                                  first dose           



                                                  03/10/16           



                                                  15:05:00           



                                                  CST, stop           



                                                  date           



                                                  03/10/16           



                                                  15:05:00           



                                                  CST            

 

     midazolam      0      No   Deuce                0.5 mg =           Me

[facility]



               3-10           Wheat                0.5 mL,           l



               21:05:                               Injection,           [place]



               00                                 IV, Once,           



                                                  first dose           



                                                  03/10/16           



                                                  15:05:00           



                                                  CST, stop           



                                                  date           



                                                  03/10/16           



                                                  15:05:00           



                                                  CST            

 

     clindamycin      -0      No   Yoan                900 mg, IV        

   [facility]



               3-10           Lei                Piggyback,           l



               20:00:                               Once,           [place]



               00                                 infuse           



                                                  over 30           



                                                  minutes,           



                                                  first dose           



                                                  03/10/16           



                                                  14:00:00           



                                                  CST, stop           



                                                  date           



                                                  03/10/16           



                                                  14:00:00           



                                                  CST,           



                                                  Prophylaxi           



                                                  s              

 

     clindamycin      -0      No   Yoan                900 mg, IV        

   [facility]



               3-10           Lei                Piggyback,           l



               20:00:                               Once,           [place]



               00                                 infuse           



                                                  over 30           



                                                  minutes,           



                                                  first dose           



                                                  03/10/16           



                                                  14:00:00           



                                                  CST, stop           



                                                  date           



                                                  03/10/16           



                                                  14:00:00           



                                                  CST,           



                                                  Prophylaxi           



                                                  s              

 

     clindamycin      -0      No   Yoan                900 mg, IV        

   [facility]



               3-10           Lei                Piggyback,           l



               20:00:                               Once,           [place]



               00                                 infuse           



                                                  over 30           



                                                  minutes,           



                                                  first dose           



                                                  03/10/16           



                                                  14:00:00           



                                                  CST, stop           



                                                  date           



                                                  03/10/16           



                                                  14:00:00           



                                                  CST,           



                                                  Prophylaxi           



                                                  s              

 

     clindamycin      -0      No   Yoan                900 mg, IV        

   [facility]



               3-10           Lei                Piggyback,           l



               20:00:                               Once,           [place]



               00                                 infuse           



                                                  over 30           



                                                  minutes,           



                                                  first dose           



                                                  03/10/16           



                                                  14:00:00           



                                                  CST, stop           



                                                  date           



                                                  03/10/16           



                                                  14:00:00           



                                                  CST,           



                                                  Prophylaxi           



                                                  s              

 

     LR 1,000 mL      -0      No   Ghassna K                1,000 mL,          

 [facility]



               3-10           Chun                IV, 30           l



               19:27:                               mL/hr,                                            start date           



                                                  03/10/16           



                                                  13:27:00           



                                                  CST            

 

     Lidocaine      -0      No   Ghassan K                0.2 mL,           [facility]

[facility]



     2% 0.2 mL      3-10           Chun                Injection,           l



     IV Start      19:27:                               Subcutaneo           Her

hernandes



     [[facility]]      00                                 us, Once           



                                                  PRN for           



                                                  other (see           



                                                  comment),           



                                                  first dose           



                                                  03/10/16           



                                                  13:27:00           



                                                  CST            

 

     LR 1,000 mL      -0      No   Ghassan K                1,000 mL,          

 [facility]



               3-10           Chun                IV, 30           l



               19:27:                               mL/hr,           [place]                                 start date           



                                                  03/10/16           



                                                  13:27:00           



                                                  CST            

 

     Lidocaine      -0      No   Ghassan K                0.2 mL,           [facility]

[facility]



     2% 0.2 mL      3-10           Chun                Injection,           l



     IV Start      19:27:                               Subcutaneo           Her

hernandes



     [[facility]]      00                                 us, Once           



                                                  PRN for           



                                                  other (see           



                                                  comment),           



                                                  first dose           



                                                  03/10/16           



                                                  13:27:00           



                                                  CST            

 

     LR 1,000 mL            No   Ghassan K                1,000 mL,          

 [facility]



               3-10           Chun                IV, 30           l



               19:27:                               mL/hr,                                            start date           



                                                  03/10/16           



                                                  13:27:00           



                                                  CST            

 

     Lidocaine            No   Ghassan K                0.2 mL,           [facility]

[facility]



     2% 0.2 mL      3-10           Chun                Injection,           l



     IV Start      19:27:                               Subcutaneo           Her

hernandes



     [[facility]]      00                                 us, Once           



                                                  PRN for           



                                                  other (see           



                                                  comment),           



                                                  first dose           



                                                  03/10/16           



                                                  13:27:00           



                                                  CST            

 

     LR 1,000 mL            No   Ghassan K                1,000 mL,          

 [facility]



               3-10           Chun                IV, 30           l



               19:27:                               mL/hr,                                            start date           



                                                  03/10/16           



                                                  13:27:00           



                                                  CST            

 

     Lidocaine            No   Ghassan K                0.2 mL,           [facility]

[facility]



     2% 0.2 mL      3-10           Chun                Injection,           l



     IV Start      19:27:                               Subcutaneo           Her

hernand[facility]



     [[facility]]      00                                 us, Once           



                                                  PRN for           



                                                  other (see           



                                                  comment),           



                                                  first dose           



                                                  03/10/16           



                                                  13:27:00           



                                                  CST            

 

     Seroquel            Yes                      100 mg,           [facility]

a



               3-09                               Oral,           l



               14:40:                               Daily, 0           [place]



               00                                 Refill(s),           



                                                  migraines           

 

     acetaminoph            Yes                      1 tabs,           [facility]

[facility]



     en-HYDROcod      3-09                               Oral,           l



     one 325      14:40:                               q6hr, 0           [place]



     mg-5 mg      00                                 Refill(s),           



     oral tablet                                         pain           

 

     traMADol 50            Yes                      50 mg = 1           [facility]

[facility]



     mg oral      3-09                               tabs,           l



     tablet      14:40:                               Oral,           [place]



               00                                 q4hr, 0           



                                                  Refill(s),           



                                                  pain           

 

     amLODIPine-            Yes                      1 tabs,           [facility]

[facility]



     atorvastati      3-09                               Oral, qHS,           l



     n 5 mg-40      14:40:                               0              [place]



     mg oral      00                                 Refill(s),           



     tablet                                         cholestero           



                                                  l/hyperten           



                                                  alexx           

 

     Osteo            Yes                      Oral,           [facility]



     Bi-Flex      3-09                               Daily, 0           l



               14:40:                               Refill(s),           [place]



               00                                 supplement           

 

     Nature's            Yes                      1,000 mg =           [facility]

[facility]



     Bounty Red      3-09                               2 caps,           l



     Krill Oil      14:40:                               Oral, BID,           [facility]

[facility]



     500 mg oral      00                                 0              



     capsule                                         Refill(s),           



                                                  supplement           

 

     aspirin 81            Yes                      81 mg = 1           Me

[facility]



     mg oral      3-09                               tabs,           l



     tablet      14:40:                               Oral,           [place]



               00                                 Daily, 0           



                                                  Refill(s),           



                                                  supplement           

 

     Axert 12.5            Yes                      12.5 mg =           Me

[facility]



     mg oral      3-                               1 tabs,           l



     tablet      14:40:                               Oral,           [place]



               00                                 Once, PRN           



                                                  for            



                                                  migraine           



                                                  headache,           



                                                  may repeat           



                                                  dose once           



                                                  in 2           



                                                  hours, # 6           



                                                  tabs, 0           



                                                  Refill(s),           



                                                  migrainesm           



                                                  ay repeat           



                                                  dose once           



                                                  in 2 hours           

 

     Wellbutrin            Yes                      300 mg,           [place]

[facility]an



     XL        3-                               Oral,           l



               14:40:                               q24hr, 0           [place]



               00                                 Refill(s),           



                                                  migraines           

 

     Seroquel            Yes                      100 mg,           [facility]

a



               3-                               Oral,           l



               14:40:                               Daily, 0           [place]



               00                                 Refill(s),           



                                                  migraines           

 

     acetaminoph            Yes                      1 tabs,           [facility]

[facility]



     en-HYDROcod      3-                               Oral,           l



     one 325      14:40:                               q6hr, 0           [place]



     mg-5 mg      00                                 Refill(s),           



     oral tablet                                         pain           

 

     traMADol 50            Yes                      50 mg = 1           M

[facility]



     mg oral      3-09                               tabs,           l



     tablet      14:40:                               Oral,           [place]



               00                                 q4hr, 0           



                                                  Refill(s),           



                                                  pain           

 

     amLODIPine-            Yes                      1 tabs,           [facility]

[facility]



     atorvastati      3-09                               Oral, qHS,           l



     n 5 mg-40      14:40:                               0              [place]



     mg oral      00                                 Refill(s),           



     tablet                                         cholestero           



                                                  l/hyperten           



                                                  alexx           

 

     Osteo            Yes                      Oral,           [facility]



     Bi-Flex      3-09                               Daily, 0           l



               14:40:                               Refill(s),           [place]



               00                                 supplement           

 

     Nature's            Yes                      1,000 mg =           [facility]

[facility]



     Bounty Red      3-09                               2 caps,           l



     Krill Oil      14:40:                               Oral, BID,           [facility]

[facility]



     500 mg oral      00                                 0              



     capsule                                         Refill(s),           



                                                  supplement           

 

     aspirin 81            Yes                      81 mg = 1           Me

[facility]



     mg oral      3-09                               tabs,           l



     tablet      14:40:                               Oral,           [place]



               00                                 Daily, 0           



                                                  Refill(s),           



                                                  supplement           

 

     Axert 12.5            Yes                      12.5 mg =           Me

[facility]



     mg oral      3                               1 tabs,           l



     tablet      14:40:                               Oral,           [place]



               00                                 Once, PRN           



                                                  for            



                                                  migraine           



                                                  headache,           



                                                  may repeat           



                                                  dose once           



                                                  in 2           



                                                  hours, # 6           



                                                  tabs, 0           



                                                  Refill(s),           



                                                  migrainesm           



                                                  ay repeat           



                                                  dose once           



                                                  in 2 hours           

 

     Wellbutrin            Yes                      300 mg,           [place]

milan



     XL        3-                               Oral,           l



               14:40:                               q24hr, 0           [place]



               00                                 Refill(s),           



                                                  migraines           

 

     Seroquel            Yes                      100 mg,           [facility]

a



               3-                               Oral,           l



               14:40:                               Daily, 0           [place]



               00                                 Refill(s),           



                                                  migraines           

 

     acetaminoph            Yes                      1 tabs,           [facility]

[facility]



     en-HYDROcod      3-09                               Oral,           l



     one 325      14:40:                               q6hr, 0           [place]



     mg-5 mg      00                                 Refill(s),           



     oral tablet                                         pain           

 

     traMADol 50            Yes                      50 mg = 1           M

[facility]



     mg oral      3-                               tabs,           l



     tablet      14:40:                               Oral,           [place]



               00                                 q4hr, 0           



                                                  Refill(s),           



                                                  pain           

 

     amLODIPine-            Yes                      1 tabs,           [facility]

[facility]



     atorvastati      3-                               Oral, qHS,           l



     n 5 mg-40      14:40:                               0              [place]



     mg oral      00                                 Refill(s),           



     tablet                                         cholestero           



                                                  l/hyperten           



                                                  alexx           

 

     Osteo            Yes                      Oral,           [facility]



     Bi-Flex      3-09                               Daily, 0           l



               14:40:                               Refill(s),           [place]



               00                                 supplement           

 

     Nature's            Yes                      1,000 mg =           [facility]

[facility]



     Bounty Red      3-09                               2 caps,           l



     Krill Oil      14:40:                               Oral, BID,           [facility]

[facility]



     500 mg oral      00                                 0              



     capsule                                         Refill(s),           



                                                  supplement           

 

     aspirin 81            Yes                      81 mg = 1           [facility]

[facility]



     mg oral      3-09                               tabs,           l



     tablet      14:40:                               Oral,           [facility]



               00                                 Daily, 0           



                                                  Refill(s),           



                                                  supplement           

 

     Axert 12.5            Yes                      12.5 mg =           [facility]

[facility]



     mg oral      3-09                               1 tabs,           l



     tablet      14:40:                               Oral,           [place]



               00                                 Once, PRN           



                                                  for            



                                                  migraine           



                                                  headache,           



                                                  may repeat           



                                                  dose once           



                                                  in 2           



                                                  hours, # 6           



                                                  tabs, 0           



                                                  Refill(s),           



                                                  migrainesm           



                                                  ay repeat           



                                                  dose once           



                                                  in 2 hours           

 

     Wellbutrin            Yes                      300 mg,           [facility]

[facility]



     XL        3-09                               Oral,           l



               14:40:                               q24hr, 0           [facility]



               00                                 Refill(s),           



                                                  migraines           

 

     Seroquel            Yes                      100 mg,           [facility]

a



               3-                               Oral,           l



               14:40:                               Daily, 0           [place]



               00                                 Refill(s),           



                                                  migraines           

 

     acetaminoph            Yes                      1 tabs,           [facility]

[facility]



     en-HYDROcod      3-                               Oral,           l



     one 325      14:40:                               q6hr, 0           [facility]



     mg-5 mg      00                                 Refill(s),           



     oral tablet                                         pain           

 

     traMADol 50            Yes                      50 mg = 1           [facility]

[facility]



     mg oral      3-                               tabs,           l



     tablet      14:40:                               Oral,           [place]



               00                                 q4hr, 0           



                                                  Refill(s),           



                                                  pain           

 

     amLODIPine-            Yes                      1 tabs,           [facility]

[facility]



     atorvastati      3-                               Oral, qHS,           l



     n 5 mg-40      14:40:                               0              [place]



     mg oral      00                                 Refill(s),           



     tablet                                         cholestero           



                                                  l/hyperten           



                                                  alexx           

 

     Osteo            Yes                      Oral,           [facility]



     Bi-Flex      3-09                               Daily, 0           l



               14:40:                               Refill(s),           [facility]



               00                                 supplement           

 

     Nature's            Yes                      1,000 mg =           [facility]

[facility]



     Bounty Red      3-09                               2 caps,           l



     Krill Oil      14:40:                               Oral, BID,           [facility]

[facility]



     500 mg oral      00                                 0              



     capsule                                         Refill(s),           



                                                  supplement           

 

     aspirin 81            Yes                      81 mg = 1           [facility]

[facility]



     mg oral      3-09                               tabs,           l



     tablet      14:40:                               Oral,           [facility]



               00                                 Daily, 0           



                                                  Refill(s),           



                                                  supplement           

 

     Axert 12.5            Yes                      12.5 mg =           [facility]

[facility]



     mg oral      3-09                               1 tabs,           l



     tablet      14:40:                               Oral,           [place]



               00                                 Once, PRN           



                                                  for            



                                                  migraine           



                                                  headache,           



                                                  may repeat           



                                                  dose once           



                                                  in 2           



                                                  hours, # 6           



                                                  tabs, 0           



                                                  Refill(s),           



                                                  migrainesm           



                                                  ay repeat           



                                                  dose once           



                                                  in 2 hours           

 

     Wellbutrin            Yes                      300 mg,           [facility]

[facility]



     XL        3-09                               Oral,           l



               14:40:                               q24hr, 0           [facility]



               00                                 Refill(s),           



                                                  migraines           







Immunizations







           Ordered Immunization Filled Immunization Date       Status     Commen

ts   Source



           Name       Name                                        

 

           FLUCELVAX QUAD PF            2022 Completed             [facility]

 

           Bebtelovimab            2022 Completed             [facility]



                                 [facility]                         [facility]

 

           FLUCELVAX QUAD PF            2021-10-05 Completed             [facility]

 

           FLUCELVAX QUAD PF            2020 Completed             [facility]



                                 [facility]

 

           Hx influenza            2019-10-23 Completed             [facility]

[facility]



           vaccine-unspecified<            00:00:00                         



           sup>1</sup>                                             

 

           Hx influenza            2019-10-23 Completed             [facility]

[facility]



           vaccine-unspecified<            00:00:00                         



           sup>1</sup>                                             

 

           Hx influenza            2019-10-23 Completed             [facility]

[facility]



           vaccine-unspecified<            00:00:00                         



           sup>1</sup>                                             

 

           FLUCELVAX QUAD PF            2019-10-23 Completed             [facility]



                                 [facility]

 

           Hx influenza            2019-10-23 Completed             [facility]

[facility]



           vaccine-unspecified<            00:00:00                         



           sup>1</sup>                                             

 

           Tdap                  2019 Completed             [facility]



                                 00[facility]00                         [facility]

 

           pneumococcal            2019 Completed             [facility]

[facility]



           23-valent             00:00:00                         



           vaccine<sup>3</sup>                                             

 

           pneumococcal            2019 Completed             [facility]

[facility]



           23-valent             00:00:00                         



           vaccine<sup>3</sup>                                             

 

           pneumococcal            2019 Completed             [facility]

[facility]



           23-valent             00:00:00                         



           vaccine<sup>3</sup>                                             

 

           pneumococcal            2019 Completed             [facility]

[facility]



           23-valent             00:00:00                         



           vaccine<sup>3</sup>                                             

 

           Hx influenza            2011-10-25 Completed             [facility]

[facility]



           vaccine-unspecified<            14:42:42                         



           sup>1</sup>                                             

 

           Hx influenza            2011-10-25 Completed             [facility]

[facility]



           vaccine-unspecified<            14:42:42                         



           sup>2</sup>                                             

 

           Hx influenza            2011-10-25 Completed             [facility]

[facility]



           vaccine-unspecified<            14:42:42                         



           sup>1</sup>                                             

 

           Hx influenza            2011-10-25 Completed             [facility]

[facility]



           vaccine-unspecified<            14:42:42                         



           sup>2</sup>                                             

 

           Hx influenza            2011-10-25 Completed             [facility]

[facility]



           vaccine-unspecified<            14:42:42                         



           sup>1</sup>                                             

 

           Hx influenza            2011-10-25 Completed             [facility]

[facility]



           vaccine-unspecified<            14:42:42                         



           sup>2</sup>                                             

 

           Hx influenza            2011-10-25 Completed             [facility]

[facility]



           vaccine-unspecified<            14:42:42                         



           sup>1</sup>                                             

 

           Hx influenza            2011-10-25 Completed             [facility]

[facility]



           vaccine-unspecified<            14:42:42                         



           sup>2</sup>                                             







Vital Signs







             Vital Name   Observation Time Observation Value Comments     Source

 

             Systolic blood 2022 21:24:34 114 mm[Hg]                [facility]



             pressure                                            

 

             Diastolic blood 2022 21:24:34 57 mm[Hg]                 [facility]



             pressure                                            

 

             Heart rate   2022 21:24:34 87 /min                   [facility]

 

             Body temperature 2022 21:24:34 36.61 [place]                 [facility]

 

             Respiratory rate 2022 21:24:34 18 /min                   [facility]

 

             Oxygen saturation in 2022 21:24:34 100 /min                  

[facility]



             Arterial blood by                                        



             Pulse oximetry                                        

 

             Body height  2022 05:00:00 170.2 cm                  [facility]

 

             Body weight  2022 05:00:00 107.2 kg                  [facility]

 

             BMI          2022 05:00:00 37.02 kg/m2               [facility]

 

             Temperature Oral (F) 2022 19:52:00 97.6 F                    

[facility]

 

             Height       2022 19:52:00 170.18 cm                 [facility]

 [place]

 

             Weight       2022 19:52:00                           [facility]

 [place]

 

             BMI Calculated 2022 19:52:00                           [facility]

[facility] [place]

 

             Heart Rate   2021 21:23:00                           [facility]

 [facility]

 

             Systolic (mm Hg) 2021 21:23:00                           [facility]

[facility] [place]

 

             Diastolic (mm Hg) 2021 21:23:00                           [facility]

[facility] [place]

 

             Height       2021 21:23:00 165.1 cm                  [facility]

 [facility]

 

             Weight       2021 21:23:00                           [facility]

 [place]

 

             BMI Calculated 2021 21:23:00                           [facility]

[facility] [place]

 

             Heart Rate   2021 20:12:00                           [facility]

 [place]

 

             Heart Rate   2021 20:08:00                           [facility]

 [place]

 

             Systolic (mm Hg) 2021 20:08:00                           [facility]

[facility] [place]

 

             Diastolic (mm Hg) 2021 20:08:00                           [facility]

[facility] [place]

 

             Height       2021 20:08:00 165.1 cm                  [facility]

 [facility]

 

             Weight       2021 20:08:00                           [facility]

 [place]

 

             BMI Calculated 2021 20:08:00                           [facility]

[facility] [place]

 

             Systolic (mm Hg) 2020 15:59:00                           [facility]

[facility] [place]

 

             Diastolic (mm Hg) 2020 15:59:00                           [facility]

[facility] [place]

 

             Heart Rate   2020 15:59:00                           [facility]

 [facility]

 

             Height       2020 15:59:00 165.1 cm                  [facility]

 [facility]

 

             Weight       2020 15:59:00                           [facility]

 [facility]

 

             BMI Calculated 2020 15:59:00                           [facility]

[facility] [place]

 

             Systolic (mm Hg) 2020 20:42:00                           [facility]

[facility] [place]

 

             Diastolic (mm Hg) 2020 20:42:00                           [facility]

[facility] [place]

 

             Heart Rate   2020 20:42:00                           [facility]

 [place]

 

             Temperature Oral (F) 2020 20:42:00 98.2 F                    

[facility] [place]

 

             Height       2020 20:42:00 170.18 cm                 [facility]

 [place]

 

             Weight       2020 20:42:00                           [facility]

 [place]

 

             BMI Calculated 2020 20:42:00                           [facility]

[facility] [place]

 

             Systolic (mm Hg) 2019 21:53:00                           [facility]

[facility] [place]

 

             Diastolic (mm Hg) 2019 21:53:00                           [facility]

[facility] [place]

 

             Heart Rate   2019 21:53:00                           [facility]

 [place]

 

             Temperature Oral (F) 2019 21:53:00 97.9 F                    

[facility]

 

             Height       2019 21:53:00 165.1 cm                  [facility]

 

             Weight       2019 21:53:00                           [facility]

 [facility]

 

             BMI Calculated 2019 21:53:00                           [facility]

[facility] [place]

 

             Height       2019-10-25 14:54:00 165.1 cm                  [facility]

 [place]

 

             Weight       2019-10-25 14:54:00                           [facility]

 [place]

 

             BMI Calculated 2019-10-25 14:54:00                           [facility]

[facility] [place]

 

             Systolic (mm Hg) 2019-10-25 14:54:00                           [facility]

[facility] [place]

 

             Diastolic (mm Hg) 2019-10-25 14:54:00                           [facility]

[facility] [place]

 

             Heart Rate   2019-10-25 14:54:00                           [facility]

 [place]

 

             Temperature Oral (F) 2019-10-25 14:54:00 97.6 F                    

[facility]

 

             Systolic (mm Hg) 2019-10-10 15:37:00                           [facility]

[facility] [place]

 

             Diastolic (mm Hg) 2019-10-10 15:37:00                           [facility]

[facility] [place]

 

             Heart Rate   2019-10-10 15:37:00                           [facility]

 [place]

 

             Temperature Oral (F) 2019-10-10 15:37:00 98.2 F                    

[facility]

 

             Height       2019-10-10 15:37:00 165.1 cm                  [facility]

 [place]

 

             Weight       2019-10-10 15:37:00                           [facility]

 [place]

 

             BMI Calculated 2019-10-10 15:37:00                           [facility]

[facility] [place]

 

             Weight       2019 15:18:00                           [facility]

 [place]

 

             BMI Calculated 2019 15:18:00                           [facility]

[facility] [place]

 

             Height       2019 15:18:00 165.1 cm                  [facility]

 [place]

 

             Temperature Oral (F) 2019 15:18:00 98.4 F                    

[facility]

 

             Heart Rate   2019 15:18:00                           [facility]

 

             Systolic (mm Hg) 2019 15:18:00                           [facility]

[facility] [place]

 

             Diastolic (mm Hg) 2019 15:18:00                           [facility]

[facility] [place]

 

             Height       2019 19:16:00 165.1 cm                  [facility]

 [place]

 

             BMI Calculated 2019 19:16:00                           [facility]

[facility] [place]

 

             Weight       2019 19:16:00                           [facility]

 [place]

 

             Heart Rate   2019 19:16:00                           [facility]

 [place]

 

             Systolic (mm Hg) 2019 19:16:00                           [facility]

[facility] [place]

 

             Diastolic (mm Hg) 2019 19:16:00                           [facility]

[facility] [place]

 

             Height       2019 14:26:00 167.01 cm                 [facility]

 [place]

 

             BMI Calculated 2019 14:26:00                           [facility]

[facility] [place]

 

             Weight       2019 14:26:00                           [facility]

 [facility]

 

             Heart Rate   2019 14:26:00                           [facility]

 [place]

 

             Temperature Oral (F) 2019 14:26:00 97.9 F                    

[facility]

 

             Systolic (mm Hg) 2019 14:26:00                           [facility]

[facility] [place]

 

             Diastolic (mm Hg) 2019 14:26:00                           [facility]

[facility]

 

             Systolic (mm Hg) 2018 18:33:00                           [facility]

[facility] [place]

 

             Diastolic (mm Hg) 2018 18:33:00                           [facility]

[facility]

 

             Heart Rate   2018 18:33:00                           [facility]

 [facility]

 

             Weight       2018 18:33:00                           [facility]

 [place]

 

             BMI Calculated 2018 18:31:00                           [facility]

[facility] [place]

 

             Weight       2018 18:31:00                           [facility]

 

             Systolic (mm Hg) 2018 18:31:00                           [facility]

[facility] [place]

 

             Diastolic (mm Hg) 2018 18:31:00                           [facility]

[facility] [place]

 

             Height       2018 18:31:00 170.18 cm                 [facility]

 [place]

 

             Temperature Oral (F) 2018 18:31:00 98.3 F                    

[facility]

 

             Heart Rate   2018 18:31:00                           [facility]

 [facility]

 

             Weight       2018 16:14:00                           [facility]

 [place]

 

             Height       2018 16:14:00 170.18 cm                 [facility]

 [facility]

 

             BMI Calculated 2018 16:14:00                           [facility]

[facility] [place]

 

             Heart Rate   2018 16:14:00                           [facility]

 [place]

 

             Systolic (mm Hg) 2018 16:14:00                           [facility]

[facility] [place]

 

             Diastolic (mm Hg) 2018 16:14:00                           [facility]

[facility] [place]

 

             BMI Calculated 2018 15:06:00                           [facility]

[facility] [place]

 

             Height       2018 15:06:00 170.18 cm                 [facility]

 [place]

 

             Weight       2018 15:06:00                           [facility]

 [place]

 

             Systolic (mm Hg) 2018 15:06:00                           [facility]

[facility] [place]

 

             Diastolic (mm Hg) 2018 15:06:00                           [facility]

[facility] [place]

 

             Heart Rate   2018 15:06:00                           [facility]

 [place]

 

             Temperature Oral (F) 2018 15:06:00 97.9 F                    

[facility] [place]

 

             Weight       2017 16:17:00                           [facility]

 [place]

 

             Height       2017 16:17:00 170.18 cm                 [facility]

 [place]

 

             BMI Calculated 2017 16:17:00                           [facility]

[facility] [place]

 

             Respitory Rate 2016 01:25:00                           [facility]

[facility] [place]

 

             Systolic (mm Hg) 2016 00:50:00                           [facility]

[facility] [place]

 

             Respitory Rate 2016 00:50:00                           [facility]

[facility] [place]

 

             Heart Rate   2016 00:50:00                           [facility]

 [facility]

 

             Systolic (mm Hg) 2016 00:40:00                           [facility]

[facility] [place]

 

             Respitory Rate 2016 00:40:00                           [facility]

[facility] [place]

 

             Heart Rate   2016 00:40:00                           [facility]

 [place]

 

             Heart Rate   2016 00:30:00                           [facility]

 [place]

 

             Systolic (mm Hg) 2016 00:30:00                           [facility]

[facility] [place]

 

             Temperature Oral (F) 2016-03-10 23:50:00 37.1 [facility]                  

[facility] [place]

 

             Height       2016-03-10 19:23:00 169 cm                    [facility]

 

             Weight       2016-03-10 19:23:00                           [facility]

 [place]

 

             Temperature Oral (F) 2016-03-10 19:23:00 36.6 [facility]                  

[facility]

 

             Height       2016 14:13:00 170 cm                    [facility]

 [place]

 

             Weight       2016 14:13:00                           [facility]

 [place]







Procedures







                Procedure       Date / Time     Performing Clinician Source



                                Performed                       

 

                PROTHROMBIN TIME WITH INR 2022 06:57:00 Alisia Reyes   [facility]

 

                COMPREHENSIVE METABOLIC 2022 06:57:00 Alisia Reyes

[facility]



                PANEL                                           

 

                MAGNESIUM LEVEL 2022 06:57:00 Alisia Reyes

spital

 

                PHOSPHORUS LEVEL 2022 06:57:00 Alisia Reyes

ospital

 

                ESTIMATED GFR   2022 06:57:00 Alisia Reyes

spital

 

                TROPONIN T      2022 06:57:00 Alisia Reyes

spital

 

                ZZCOVID-19 ANTI-SPIKE IGG 2022 06:56:00 Alisia Reyes   [facility]



                ANTIBODY TITER                                  

 

                COVID-19 SEROLOGY PATIENT 2022 06:56:00 Alisia Reyes   [facility]



                SURVEILLANCE                                    

 

                HC COMPLETE BLD COUNT 2022 06:56:00 [facility]



                W/AUTO DIFF                                     

 

                COVID-19 QUALITATIVE 2022 04:58:00 [facility]



                RT-PCR                                          

 

                TROPONIN T      2022 03:58:00 Alisia Reyes 

spital

 

                XR CHEST 1 VW PORTABLE 2022 01:31:25 [facility] METABOLIC 2022 01:20:00 [facility]



                PANEL                                           

 

                LIPASE LEVEL    2022 01:20:00 [facility]

 

                TROPONIN T      2022 01:20:00 [facility]

[facility]

 

                B NATRIURETIC PEPTIDE 2022 01:20:00 [facility]

 

                HC COMPLETE BLD COUNT 2022 01:20:00 [facility]



                W/AUTO DIFF                                     

 

                ESTIMATED GFR   2022 01:20:00 [facility]

 

                ECG 12-LEAD     2022 01:03:45 Alisia Reyes 

spital

 

                COMPREHENSIVE METABOLIC 2022 16:47:00 [facility] Adriel [facility]



                PANEL                           Baljinder       

 

                CBC WITH PLATELET AND 2022 16:47:00 [facility]



                DIFFERENTIAL                    Baljinder       

 

                THYROID STIMULATING 2022 16:47:00 [facility]



                HORMONE                         Baljinder       

 

                PARATHYROID HORMONE 2022 16:47:00 [facility]



                                                Baljinder       

 

                VITAMIN D 25 HYDROXY LEVEL 2022 16:47:00 [facility]



                                                Baljinder       

 

                NM BONE SCAN 3 PHASE 2022-10-05 18:11:00 [facility]



                                                Baljinder       

 

                BONE DENSITY    2022-10-05 14:02:48 Adriel Sparks [facility]

spital



                                                Baljinder       

 

                BONE DENSITY PERIPHERAL 2022-10-05 14:02:48 Adriel Sparks [facility]



                                                Baljinder       

 

                POC GLUCOSE     2022 04:49:00 [facility]

[facility]

 

                CBC HEMOGRAM    2022 10:05:00 JassiSanket snider Ho

spital

 

                BASIC METABOLIC PANEL 2022 10:05:00 [facility]

 

                ESTIMATED GFR   2022 10:05:00 Adriel Sparks [facility]

spital



                                                Baljinder       

 

                XR LUMBAR SPINE 2 OR 3 VW 2022 21:10:24 Jassi, Sanketadina YING  [facility]

 

                XR THORACIC SPINE 2 VW 2022 21:10:08 Jassi, Sanket KGisele  [facility]

 

                XR CHEST 1 VW PORTABLE 2022 19:05:29 Martina Douglass [facility]

 

                BASIC METABOLIC PANEL 2022 09:08:00 [facility]

 

                PHOSPHORUS LEVEL 2022 09:08:00 [facility]

 

                ESTIMATED GFR   2022 09:08:00 [facility]

 

                TTE COMPLETE, WO CONTRAST, 2022 15:40:58 [facility]



                W DOPPLER ([id number])                                 

 

                MRI THORACIC SPINE WO 2022 02:11:31 Ad[facility]



                CONTRAST                        Baljinder       

 

                MRI LUMBAR SPINE WO 2022 01:29:36 Quinton Centeno [facility]



                CONTRAST                        [facility]   

 

                HEPATITIS B CORE ANTIBODY 2022 17:34:00 Francine Fishman [facility]



                TOTAL                                           

 

                HEPATITIS B SURFACE AB, 2022 17:34:00 FishmanFrancine hooper

[facility]



                QUANTITATIVE                                    

 

                HEPATITIS B SURFACE 2022 17:34:00 Francine Fishman [facility]



                ANTIGEN                                         

 

                COVID-19 QUALITATIVE 2022 16:35:00 [facility]



                RT-PCR                          Dasharathbhai   

 

                COVID-19 OMICRON VARIANT 2022 16:35:00 [facility]



                QUALITATIVE RT-PCR                 [facility]   

 

                POC GLUCOSE     2022 16:35:00 Martina Douglass Ho

spital

 

                POC GLUCOSE     2022 15:09:00 [facility]   

 

                CT LUMBAR SPINE WO 2022 13:41:51 [facility]



                CONTRAST                        [facility]   

 

                CT RENAL STONE PROTOCOL 2022 13:41:38 [facility]   

 

                HC COMPLETE BLD COUNT 2022 12:44:00 [facility]



                W/AUTO DIFF                     [facility] 2022 12:44:00 [facility]



                PANEL                           [facility]   

 

                ESTIMATED GFR   2022 12:44:00 [facility]   

 

                [place] CRITICAL CARE, E/M 2022 12:28:33 [facility]



                30-74 MINUTES                   [facility]   

 

                HEPATITIS B SURFACE 2022 15:33:00                 [facility]



                ANTIGEN                                         [facility]

 

                HEPATITIS B SURFACE 2022 15:33:00                 [facility]



                ANTIBODY                                        [facility]

 

                XR CHEST 1 VW PORTABLE 2022 13:25:18 Pamela Blanton    [facility] METABOLIC 2022 10:32:00 Shelby Calle

 [facility]



                PANEL                                           

 

                HC COMPLETE BLD COUNT 2022 10:32:00 Shelby Calle [facility]



                W/AUTO DIFF                                     

 

                ESTIMATED GFR   2022 10:32:00 [facility]

 

                HEMODIALYSIS    2022 05:06:40 Janeen Rosenthal 

sebastian Ochoa        

 

                HEPATITIS B CORE ANTIBODY 2022 20:59:00 Galo [facility]



                TOTAL                           [facility]        

 

                HEPATITIS B CORE ANTIBODY 2022 20:59:00 BarMarcelino [facility]



                IGM                             [facility]        

 

                HEPATITIS B SURFACE 2022 20:59:00 Galo [facility]



                ANTIGEN                         [facility]        

 

                HEPATITIS B SURFACE AB, 2022 20:58:00 Galo [facility]



                QUANTITATIVE                    Gabriela        

 

                HEMODIALYSIS    2022 15:58:20 Galo [facility]

ospital



                                                [facility]        

 

                TROPONIN T      2022 15:44:00 [facility]

 

                XR CHEST 1 VW PORTABLE 2022 11:41:07 [facility]

 

                TROPONIN T      2022 11:41:00 [facility]

 

                HC COMPLETE BLD COUNT 2022 11:41:00 [facility]



                W/AUTO DIFF                                     

 

                COMPREHENSIVE METABOLIC 2022 11:41:00 [facility]



                PANEL                                           

 

                PROTHROMBIN TIME WITH INR 2022 11:41:00 [facility]

[facility]

 

                PARTIAL THROMBOPLASTIN 2022 11:41:00 [facility]



                TIME (PTT)                                      

 

                B NATRIURETIC PEPTIDE 2022 11:41:00 [facility]

 

                PROCALCITONIN   2022 11:41:00 [facility]

 

                CREATINE KINASE, TOTAL 2022 11:41:00 [facility]



                (CPK)                                           

 

                C-REACTIVE PROTEIN 2022 11:41:00 [facility]

 

                INTERLEUKIN 6   2022 11:41:00 [facility]

 

                FERRITIN LEVEL  2022 11:41:00 [facility]

 

                D-DIMER         2022 11:41:00 [facility]

 

                LDH             2022 11:41:00 [facility]

 

                FIBRINOGEN      2022 11:41:00 [facility]

 

                ESTIMATED GFR   2022 11:41:00 [facility]

 

                RESPIRATORY PATHOGEN PANEL 2022 09:45:00 [facility]



                WITH COVID-19 RT-PCR                 All         

 

                XR CHEST 1 VW   2022 08:05:46 Louann Jaime         

 

                BLOOD CULTURE, AEROBIC & 2022 07:49:00 Louann Jaime [facility]



                ANAEROBIC                       All         

 

                HC COMPLETE BLD COUNT 2022 07:49:00 Louann Jaime [facility]



                W/AUTO DIFF                     All         

 

                COMPREHENSIVE METABOLIC 2022 07:49:00 Louann Jaime [facility]



                PANEL                           All         

 

                TROPONIN T      2022 07:49:00 [facility]

 

                B NATRIURETIC PEPTIDE 2022 07:49:00 [facility]



                                                All         

 

                ESTIMATED GFR   2022 07:49:00 Louann Jaime         

 

                ECG ED PRELIMINARY 2022 07:26:13 Louann Jaime [facility]



                INTERPRETATION                  All         

 

                ECG 12-LEAD     2022 07:20:02 Louann Jaime         

 

                CT ABDOMEN PELVIS WO 2022 23:38:27 Mahamed [facility]



                CONTRAST                                        

 

                HC COMPLETE BLD COUNT 2022 23:14:00 Mahamed [facility]



                W/AUTO DIFF                                     

 

                COMPREHENSIVE METABOLIC 2022 23:14:00 Mahamed [facility]



                PANEL                                           

 

                LIPASE LEVEL    2022 23:14:00 [facility]

 

                ESTIMATED GFR   2022 23:14:00 [facility]

 

                BASIC METABOLIC PANEL 2022 22:54:00 Galo, Metho

[facility]



                                                Gabriela        

 

                ESTIMATED GFR   2022 22:54:00 [facility]

[facility]



                                                Gabriela        

 

                POC GLUCOSE     2022 22:04:00 Trev Neves [facility]

 

                CT ANGIOGRAM PE CHEST 2022 00:23:56 Emilia Bonilla

[facility]

 

                IR VERTEBRO LUM UNI OR IVON 2022 19:25:00 Christine Saini

[facility]

 

                ECG 12-LEAD     2022 18:05:39 Mariluz Noe [facility]

 

                BASIC METABOLIC PANEL 2022 10:16:00 Edinson [facility]

 

                HC COMPLETE BLD COUNT 2022 10:16:00 Edinson [facility]



                W/AUTO DIFF                                     

 

                PROTHROMBIN TIME WITH INR 2022 10:16:00 Trev Neves

[facility]

 

                ESTIMATED GFR   2022 10:16:00 Trev Neves Saúl [facility]

 

                TYPE AND SCREEN 2022 10:16:00 Edinson [facility]

 

                TROPONIN T      2022 02:33:00 Edinson Trev Saúl [facility]

 

                HEMODIALYSIS    2022 01:59:31 [facility]onDacalyssia[facility]

[facility]



                                                Gabriela        

 

                TTE COMPLETE, WO CONTRAST, 2022 00:15:00 Trev Neves

ier [facility]



                W DOPPLER ([id number])                                 

 

                TROPONIN T      2022 23:10:00 Edinson Trev [facility]

 

                TROPONIN T      2022 20:19:00 Edinson Trev Saúl [facility]

 

                HEMODIALYSIS    2022 13:51:20 [facility]aca[facility]

[facility]



                                                Gabriela        

 

                HC COMPLETE BLD COUNT 2022 11:10:00 Parveen [facility]



                W/AUTO DIFF                                     

 

                BASIC METABOLIC PANEL 2022 11:10:00 Parveen [facility]

 

                PARATHYROID HORMONE 2022 11:10:00 [facility]

 

                VITAMIN D 25 HYDROXY LEVEL 2022 11:10:00 Christine Saini

[facility]

 

                DIGOXIN LEVEL   2022 11:10:00 [facility]

[facility]



                                                Gabriela        

 

                PHOSPHORUS LEVEL 2022 11:10:00 [facility]



                                                Gabriela        

 

                ESTIMATED GFR   2022 11:10:00 Christine Saini [facility]

 

                HC COMPLETE BLD COUNT 2022 09:45:00 Parveen [facility]



                W/AUTO DIFF                                     

 

                BASIC METABOLIC PANEL 2022 09:45:00 Parveen [facility]

 

                PROTHROMBIN TIME WITH INR 2022 09:45:[address] [facility]

 

                ESTIMATED GFR   2022 09:45:00 Parveen [facility]

 

                GASTROINTESTINAL PANEL 2022 22:47:00 Nadia Mendez  [facility]

 

                XR CHEST 1 VW PORTABLE 2022 17:34:26 Parveen [facility]

 

                HC COMPLETE BLD COUNT 2022 10:01:00 Parveen [facility]



                W/AUTO DIFF                                     

 

                BASIC METABOLIC PANEL 2022 10:01:00 Parveen [facility]

 

                ESTIMATED GFR   2022 10:01:00 Parveen [facility]

 

                HEMODIALYSIS    2022 14:21:31 [facility]

ospital



                                                Gabriela        

 

                HC COMPLETE BLD COUNT 2022 10:11:00 Parveen [facility]



                W/AUTO DIFF                                     

 

                COMPREHENSIVE METABOLIC 2022 10:11:00 Christine Saini [facility]



                PANEL                                           

 

                ESTIMATED GFR   2022 10:11:00 Parveen [facility]

 

                MRI LUMBAR SPINE WO 2022 02:13:34 Shaikh [facility]



                CONTRAST                                        

 

                POTASSIUM LEVEL 2022 18:15:00 [facility]

[facility]



                                                Gabriela        

 

                HEPATITIS B SURFACE 2022 13:46:00 Baranowssandee-Rosy, [facility]



                ANTIGEN                         Gabriela        

 

                HEPATITIS B SURFACE 2022 13:46:00 [facility]



                ANTIBODY                        Gabriela        

 

                TROPONIN T      2022 13:43:00 Rosalio Angulo Ho

spital

 

                HEMODIALYSIS    2022 13:16:08 Beth-Cristiano [facility]ist H

ospital



                                                Gabriela        

 

                HC COMPLETE BLD COUNT 2022 11:21:00 Shaikh [facility]



                W/AUTO DIFF                                     

 

                PROTHROMBIN TIME WITH INR 2022 11:21:00 [facility]

 

                COMPREHENSIVE METABOLIC 2022 11:21:00 Rosalio Angulo    [facility]



                PANEL                                           

 

                THYROID STIMULATING 2022 11:21:00 Shaikh [facility]



                HORMONE                                         

 

                ESTIMATED GFR   2022 11:21:00 Rosalio Angulo 

spital

 

                ZZCOVID-19 ANTI-SPIKE IGG 2022 06:43:00 [facility]



                ANTIBODY TITER                                  

 

                COVID-19 SEROLOGY PATIENT 2022 06:43:00 Shaikh [facility]



                SURVEILLANCE                                    

 

                TROPONIN T      2022 06:43:00 Rosalio Angulo 

spital

 

                PROCALCITONIN   2022 06:43:00 Rosalio Angulo 

spital

 

                URINE CULTURE   2022 05:18:00 Nadia Mendez 

spital

 

                COVID-19 QUALITATIVE 2022 04:41:00 Nadia Mendez  [facility]



                RT-PCR                                          

 

                URINALYSIS SCREEN AND 2022 04:41:00 Vanessa [facility]



                MICROSCOPY, WITH REFLEX TO                                 



                CULTURE                                         

 

                CT ABDOMEN PELVIS WO 2022 03:51:11 Nadia Mendezi

[facility]



                CONTRAST                                        

 

                CT LUMBAR SPINE WO 2022 03:49:20 [facility]essa [facility]



                CONTRAST                                        

 

                COMPREHENSIVE METABOLIC 2022 03:31:00 Nadia Mendez  [facility]



                PANEL                                           

 

                HC COMPLETE BLD COUNT 2022 03:31:00 Vanessa [facility]



                W/AUTO DIFF                                     

 

                ESTIMATED GFR   2022 03:31:00 [facility]

[facility]

 

                XR CHEST 1 VW   2022 03:22:49 Nadia Mendez  [facility]

spital

 

                ECG ED PRELIMINARY 2022 02:44:33 Vanessa [facility]



                INTERPRETATION                                  

 

                ECG 12-LEAD     2022 02:40:03 Rosalio Anguloist Ho

spital

 

                HEMODIALYSIS    2022 14:28:03 [facility]

[facility]



                                                Gabriela        

 

                BASIC METABOLIC PANEL 2022 03:17:00 Baranowska-Daca, [facility]



                                                Gabriela        

 

                ESTIMATED GFR   2022 03:17:00 [facility]

[facility]



                                                Gabriela        

 

                POC GLUCOSE     2022 02:29:00 [facility]



                                                R               

 

                IR VERTEBRO CERVICAL AND 2022 20:19:56 Melissa Pickett

[facility]



                THOR UNI OR IVON                 R               

 

                IR VERTEBROPLASTY EA ADDL 2022 20:19:56 Melissa Pickett

[facility]



                T OR L                          R               

 

                TN AN ELECTIVE  2022 19:47:00 Juan Jose [facility]



                ENDOTRACHEAL AIRWAY                                 

 

                HEMODIALYSIS    2022 20:14:38 [facility]

[facility]



                                                Gabriela        

 

                BLOOD CULTURE, AEROBIC & 2022 20:28:00 Magy Earl

[facility]



                ANAEROBIC                                       

 

                URINE CULTURE   2022 23:56:00 [facility]

[facility]



                                                Gabriela        

 

                CT RENAL STONE PROTOCOL 2022 23:39:33 Baranowska-Daca, 

[facility]        

 

                URINALYSIS SCREEN AND 2022 22:29:00 Galo, [facility]



                MICROSCOPY, WITH REFLEX TO                 Gabriela        



                CULTURE                                         

 

                HEMODIALYSIS    2022 15:08:13 Baranolew-Dacalyssia, [facility] 

ospital



                                                Gabriela        

 

                HC COMPLETE BLD COUNT 2022 09:55:00 Nieves, [facility]



                W/AUTO DIFF                     R               

 

                BASIC METABOLIC PANEL 2022 09:55:00 Solstacey, [facility]



                                                R               

 

                PROTHROMBIN TIME WITH INR 2022 09:55:00 Jessica, Melissa Gtz

[facility]



                                                R               

 

                URIC ACID LEVEL 2022 09:55:00 Baranowska-Daca, [facility]

ospital



                                                Gabriela        

 

                DIGOXIN LEVEL   2022 09:55:00 Baranowska-Daca, [facility]

ospital



                                                Gabriela        

 

                CORTISOL LEVEL, AM 2022 09:55:00 Baranowssandee-Rosy, [facility]        

 

                HEMOGLOBIN A1C  2022 09:55:00 Baranowska-Daca, [facility]

ospital



                                                Gabriela        

 

                AMYLASE LEVEL   2022 09:55:00 Baranowska-Daca, [facility]

ospital



                                                Gabriela        

 

                LIPASE LEVEL    2022 09:55:00 [facility]



                                                Gabriela        

 

                ESTIMATED GFR   2022 09:55:00 Nieves, [facility]



                                                R               

 

                VITAMIN D 25 HYDROXY LEVEL 2022 09:33:00 Baranowssandee-Samsona, 

[facility]        

 

                PARATHYROID HORMONE 2022 09:33:00 Soniawssandee-Rosy, [facility]        

 

                PHOSPHORUS LEVEL 2022 09:33:00 Baranowska-Daca, [facility]        

 

                XR SHOULDER 2+ VW RIGHT 2022 04:05:25 Beth-Rosy, [facility]        

 

                HEMODIALYSIS    2022 03:41:52 [facility]Daca, [facility]

[facility]



                                                Gabriela        

 

                MRI LUMBAR SPINE WO 2022 15:10:25 Bree [facility]



                CONTRAST                                        

 

                MRI THORACIC SPINE WO 2022 14:44:08 Bree Ivan [facility]



                CONTRAST                                        

 

                BASIC METABOLIC PANEL 2022 10:43:00 [facility]        

 

                ESTIMATED GFR   2022 10:43:00 [facility]Daca, [facility]



                                                [facility]        

 

                BASIC METABOLIC PANEL 2022 22:26:00 Baranowska-Daca, [facility]        

 

                ESTIMATED GFR   2022 22:26:00 [facility]Daca, [facility]

[facility]



                                                Gabriela        

 

                HEMODIALYSIS    2022 14:03:25 Baranowska-Daca, [facility]

ospital



                                                Gabriela        

 

                HC COMPLETE BLD COUNT 2022 09:43:00 [facility]



                W/AUTO DIFF                     Tajdin          

 

                COMPREHENSIVE METABOLIC 2022 09:43:00 [facility]



                PANEL                           Tajdin          

 

                MAGNESIUM LEVEL 2022 09:43:00 [facility]



                                                Tajdin          

 

                ESTIMATED GFR   2022 09:43:00 [facility]



                                                Tajdin          

 

                TROPONIN T      2022 00:24:00 Tone Irby [facility]

 

                HEPATITIS B SURFACE 2022 00:24:00 Beth-Rosy [facility]



                ANTIGEN                         [facility]        

 

                HEPATITIS B SURFACE AB, 2022 00:24:00 Barankushwska-Daca [facility]



                QUANTITATIVE                    Gabriela        

 

                HEMODIALYSIS    2022 22:22:37 [facility]Daca, [facility]



                                                [facility]        

 

                THYROID STIMULATING 2022 21:32:00 Esther Biggs [facility]



                HORMONE                                         

 

                T4, FREE        2022 21:32:00 Esther Biggs [facility]

spital

 

                TROPONIN T      2022 21:32:00 Esther Biggs [facility]

spital

 

                COVID-19 QUALITATIVE 2022 21:22:00 Ray, [facility]



                RT-PCR                                          

 

                CT LUMBAR SPINE WO 2022 20:55:41 Ray, [facility]



                CONTRAST                                        

 

                CT THORACIC SPINE WO 2022 20:53:28 Ray, [facility]



                CONTRAST                                        

 

                CT ANGIOGRAM PE CHEST 2022 20:45:21 Ray, [facility]

 

                ECG 12-LEAD     2022 19:35:11 Ray, [facility]

 

                XR THORACIC SPINE 2 VW 2022 19:17:36 Ray, [facility]

 

                HC COMPLETE BLD COUNT 2022 18:50:00 Ray, [facility]



                W/AUTO DIFF                                     

 

                COMPREHENSIVE METABOLIC 2022 18:50:00 Ray, [facility]



                PANEL                                           

 

                TROPONIN T      2022 18:50:00 Ray, [facility]

 

                ESTIMATED GFR   2022 18:50:00 Ray, [facility]

 

                LIPASE LEVEL    2022 18:50:00 Ray, [facility]

 

                ECG ED PRELIMINARY 2022 18:18:35 Ray, [facility]



                INTERPRETATION                                  

 

                XR CHEST 2 VW   2022 16:02:54 [facility]

ospital

 

                Diabetic retinal eye 2020 06:00:00                 Dillan Mart



                exam<sup>1</sup>                                 

 

                Mammogram       2017-07-15 05:00:00                 Doreen hernandes

 

                Colonoscopy<sup>2</sup> 2017 06:00:00                 Hakeem Mart

 

                OPEN REDUCTION INTERNAL 2016-03-10 22:13:00 LeiShandra crockerew   [facility]



                FIXATION RADIUS                                 



                INTRA-ARTICULAR W/3                                 



                FRAGMENTS 00986                                 



                (Right)<sup>1</sup>                                 

 

                Repair of       2016-03-10 06:00:00                 Doreen hernandes



                wrist<sup>3</sup>                                 

 

                [facility] cancer [facility] 2012 [facility]00:00                 [facility]



                arm                                             

 

                Cholecystectomy                                 [facility]

 

                Procedure<sup>2</sup>                                 [facility]

 

                Tubal ligation                                  [facility]

 

                Eye operation                                   [facility]

 

                Biopsy of breast                                 [facility]

n

 

                bilateral radial                                 [facility]

n



                kerototomy                                      

 

                bilateral tubal ligation                                 [facility]

[facility]

 

                colonoscopy                                     [facility]

 

                Skin cancer of                                  [facility]



                arm<sup>4</sup>                                 







Plan of Care







             Planned Activity Planned Date Details      Comments     Source

 

             Future Scheduled 2022   HEPATITIS B VACCINES              Met

[facility]



             Test         11:07:43     (1 of 3 - 3-dose              



                                       series) [code =              



                                       HEPATITIS B VACCINES              



                                       (1 of 3 - 3-dose              



                                       series)]                  

 

             Future Scheduled 2022   COVID-19 VACCINE (#1)              [facility]



             Test         11:07:43     [code = COVID-19              



                                       VACCINE (#1)]              

 

             Future Scheduled 2022   65+ PNEUMOCOCCAL              [facility]



             Test         11:07:43     VACCINE (1 - PCV)              



                                       [code = 65+               



                                       PNEUMOCOCCAL VACCINE              



                                       (1 - PCV)]                

 

             Future Scheduled 2022   SHINGLES VACCINES (1              Met

[facility]



             Test         11:07:43     of 2) [code = SHINGLES              



                                       VACCINES (1 of 2)]              

 

             Future Scheduled 2022   Screening for              [facility]



             Test         11:07:43     malignant neoplasm of              



                                       cervix (procedure)              



                                       [code = 467956067]              

 

             Future Scheduled 2022   COLONOSCOPY SCREENING              [facility]



             Test         11:07:43     [code = COLONOSCOPY              



                                       SCREENING]                

 

             Future Scheduled 2022   BREAST CANCER              [facility]



             Test         11:07:43     SCREENING [code =              



                                       BREAST CANCER              



                                       SCREENING]                







Encounters







        Start   End     Encounter Admission Attending Care    Care    Encounter 

Source



        Date/Time Date/Time Type    Type    Clinicians Facility Department ID   

   

 

        2022         Inpatient RUFINA Cuellar, KHANHPM   ENDO    [id number]

8 [facility]



        09:00:00                         [address]

 

        2022 [facility]         [facility] 1.2.840.[facility] [id number]

[address] [place]



        00:00:00 00:00:00 Encounter         SARBJIT 33599.1.1         208     [facility]



                                                3.430.2.7                 [facility] [[email or web address]][email or web address]                 



                                                .8                      

 

        2022 Travel                  1.2.840.1 1.2.029.662 3621

[id number] Methodi



        00:00:00 00:00:00                         22309.1.1 350.1.13.43 679     

st



                                                3.430.2.7 0.2.7.3.698         Ho

spita



                                                .3.477732 084.8           l



                                                .8                      

 

        2022 [facility],  1.2.840.1 [id number]

6850 Methodi



        00:00:00 00:00:00 Only            Adriel 82392.1.1         [address]     st



                                        Baljinder 3.430.2.7                 Hosp

mimi



                                                .3.218426                 l



                                                .8                      

 

        2022-10-21 2022-10-21 [facility],  1.2.840.1 [id number]

5385 Methodi



        12:00:00 12:15:00 Visit           Adriel 23289.1.1         [address]     st



                                        Baljinder 3.430.2.7                 Hosp

mimi



                                                .3.914237                 l



                                                .8                      

 

        2022-10-21 2022-10-21 Travel                  1.2.840.1 1.2.151.122 3818

[id number] Methodi



        00:00:00 00:00:00                         52922.1.1 350.1.13.43 055     

st



                                                3.430.2.7 0.2.7.3.698         Ho

spita



                                                .3.658213 084.8           l



                                                .8                      

 

        2022-10-12 2022-10-12 Travel                  1.2.840.1 1.2.291.043 2669

[id number] Methodi



        00:00:00 00:00:00                         77404.1.1 350.1.13.43 459     

st



                                                3.430.2.7 0.2.7.3.698         Ho

spita



                                                .3.174392 084.8           l



                                                .8                      

 

        2022-10-05 2022-10-05 [facility],  1.2.840.1 [id number]

[id number] [place]



        00:00:00 00:00:00 Encounter         ADRIEL 91358.1.1         622     M

ethodi



                                                3.430.2.7                 st



                                                .3.264087                 



                                                .8                      

 

        2022-10-05 2022-10-05 Hospital         [facility],  1.2.840.1 [id number] 

[id number] [place]



        00:00:00 00:00:00 Encounter         ADRIEL 81496.1.1         624     M

ethodi



                                                3.430.2.7                 st



                                                .3.939780                 



                                                .8                      

 

        2022-10-05 2022-10-05 [facility],  1.2.840.1 [id number] 

[id number] [place]



        00:00:00 00:00:00 Encounter         ADRIEL 44296.1.1         626     M

ethodi



                                                3.430.2.7                 st



                                                .3.535323                 



                                                .8                      

 

        2022-10-05 2022-10-05 Travel                  1.2.840.1 1.2.798.731 8822

[id number] Methodi



        00:00:00 00:00:00                         98965.1.1 350.1.13.43 702     

st



                                                3.430.2.7 0.2.7.3.698         Ho

spita



                                                .3.695578 084.8           l



                                                .8                      

 

        2022 Office          Saeidmichalegt, 1.2.840.1 [id number]

9152 Methodi



        14:15:00 14:15:00 Visit           Daylin  35324.1.1         502     st



                                                3.430.2.7                 Hospit

a



                                                .3.619717                 l



                                                .8                      

 

        2022 Travel                  1.2.840.1 1.2.172.035 1546

387810 Methodi



        00:00:00 00:00:00                         53068.1.1 350.1.13.43 876     

st



                                                3.430.2.7 0.2.7.3.698         Ho

spita



                                                .3.439341 084.8           l



                                                .8                      

 

        2022 Office          Bridger,  1.2.840.1 [id number]

3139 Methodi



        12:45:00 13:22:26 Visit           Adriel 17523.1.1         547     st



                                        Baljinder 3.430.2.7                 Hosp

mimi



                                                .3.131400                 l



                                                .8                      

 

        2022 Travel                  1.2.840.1 1.2.218.801 3242

516128 Methodi



        00:00:00 00:00:00                         72255.1.1 350.1.13.43 541     

st



                                                3.430.2.7 0.2.7.3.698         Ho

spita



                                                .3.524807 084.8           l



                                                .8                      

 

          2022 [facility]            [facility]

[facility] 1.2.840.1 

[id number]                 [id number]                Methodi



        07:21:00 20:54:00 Encounter         Martina Douglass 29403.1.1         90

4     st



                                                3.430.2.7                 Hospit

a



                                                .3.810502                 l



                                                .8                      

 

        2022 Prep for         Lei, 1.2.840.1 [id number] 79727

99972 Methodi



        00:00:00 00:00:00 Surgery         Louann D 02265.1.1         190     s

t



                                                3.430.2.7                 Hospit

a



                                                .3.392602                 l



                                                .8                      

 

        2022 Travel                  1.2.840.1 1.2.747.389 6267

289889 Methodi



        00:00:00 00:00:00                         10321.1.1 350.1.13.43 602     

st



                                                3.430.2.7 0.2.7.3.698         Ho

spita



                                                .3.066426 084.8           l



                                                .8                      

 

        2022 Outpatient CLEMENTE Minaya   OUTD    [id number]

[id number] [facility]



        04:58:00 04:58:00                 Venancio                  89      [facility]

 

        2022 Outpatient CLEMENTE Minaya   OUTD    [id number]

[id number] [facility]



        06:37:00 06:37:00                 Venancio                  64      [facility]

 

        2022 Lab                     [facility]   [id number]

[address] [facility]



        00:00:00 00:00:00 [facility]

 

        2022 Lab                     [facility]   [id number]

[address] [facility] [address]



        00:00:00 00:00:00 [facility]

 

        2022 Outpatient         Armstrong_B [facility]     476

[id number] [place]



        00:00:00 00:00:00                                         [id number]   [place]



                                                                        U[facility]

 

        2022 Emergency         Louann Jaime 1.2.840

.1 [id number] 

[id number]                              Methodi



        02:04:00 18:01:00                 Diab Aki 04602.1.1         026     

st



                                        [facility] 3.430.2[facility]7          

       [facility]



                                        Squires, Fantasma .3.384887                 

l



                                                .8                      

 

        2022 Travel                  1.2.840.1 1.2.718.821 9252

223472 Methodi



        00:00:00 00:00:00                         11675.1.1 350.1.13.43 552     

st



                                                3.430.2.7 0.2.7.3.698         Ho

spita



                                                .3.978554 084.8           l



                                                .8                      

 

        2022 Outpatient                 nullFlavo [facility]

[id number] [facility]



        21:20:00 04:59:59                         r       Medicine 56      l



                                                        Rudy Hines

n

 

        2022 Outpatient                 nullFlavo [facility]

[id number] [facility]



        21:20:00 04:59:59                         r       Medicine 56      l



                                                        Rudy Hines

n

 

        2022 Outpatient         Green,  [facility]    MG    [id number]

[id number] 



        16:20:00 23:59:59                 Charisse N                 56      

 

        2022 Outpatient                 [facility]    [facility]    [id number]

[id number] [facility]



        16:20:00 16:20:00                                         56      l



                                                                        [facility]

 

        2022 Emergency         Maureen  1.2.840.1 [id number]

[id number] Methodi



        17:10:00 22:30:00                 Martha   44791.1.1         503     st



                                        [place] 3.430.2.7                 Hosp

mimi



                                                .3.678700                 l



                                                .8                      

 

        2022 Travel                  1.2.840.1 1.2.004.751 6062

[id number] Methodi



        00:00:00 00:00:00                         46435.1.1 350.1.13.43 329     

st



                                                3.430.2.7 0.2.7.3.698         Ho

spita



                                                .3.634103 084.8           l



                                                .8                      

 

        2022 Hospital         Parveen Hutchinson T. 1.2.840.1 [id number]

[id number] 

[id number]                              Methodi



        21:20:00 13:07:00 Encounter         Rosalio Angulo 07614.1.1         968  

   st



                                        Christine Sainiyan 3.430.2.7         

        Hospita



                                        Trev Neves .3.361814          

       l



                                                .8                      

 

        2022 Anesthesia         Noe,   1.2.840.1 [id number]

[id number] Methodi



        13:41:00 14:30:00 Event           Mariluz  61207.1.1         538     st



                                        Laura  3.430.2.7                 Hospit

a



                                                .3.377675                 l



                                                .8                      

 

        2022 Travel                  1.2.840.1 1.2.397.352 3061

028455 Methodi



        00:00:00 00:00:00                         24803.1.1 350.1.13.43 714     

st



                                                3.430.2.7 0.2.7.3.698         Ho

spita



                                                .3.081458 084.8           l



                                                .8                      

 

        2022 Outpatient Suzie Ramos HCAPM   DAYS    [id number]

[id number] [facility]



        07:07:00 07:07:00                                         79      [facility]

 

        2022 Outpatient Suzie Ramos HCAPM   HCAPM   F94

[id number] [facility]



        07:07:00 07:07:00                                            [facility]

 

        2022 Phone                   [facility] Jtqikm 5603 [id number] Memoria



        15:25:04 04:59:59 Message                 r       Medicine 17      l



                                                        Rudy martinez

 

        2022 Phone                   nullFlavo [facility] 3467

[id number] Memoria



        15:25:04 04:59:59 Message                 r       Medicine 17      l



                                                        Rudy Hines

n

 

        2022 Outpatient                 [facility]    MG    [id number]

[id number] 



        10:25:04 23:59:59                                         17      

 

        2022 Emergency EM      Hadley Medina HCAPM   CT    [id number]

[id number] [facility]



        12:08:00 15:51:00                                         00      [facility]

 

        2022 Emergency EM      Hadley Medina HCAPM   HCAPM   [id number]

 [facility]



        12:08:00 15:51:00                                            [facility]

 

        2022 Outpatient RUFINA Cuellar HCAPM   ENDO    

[id number] [facility]



        07:10:00 07:10:00                 Clark                   92      [facility]

 

        2022 Ambulatory                 [facility]

[id number] Memoria



        15:15:00 15:15:00 Pre-Reg                 r       Medicine 54      l



                                                        Rudy martinez

 

        2022 Ambulatory                 [facility]

[id number] Memoria



        15:15:00 15:15:00 Pre-Reg                 r       Medicine 54      l



                                                        Rudy martinez

 

        2022 Outpatient                 [facility]    [facility]    [id number]

365 Memoria



        10:15:00 10:15:00                                         54      dennis Rosarioann

 

        2022 Outpatient         Green,  [facility]    [id number]

365 



        10:15:00 10:15:00                 Charisse MARTINEZ                 54      

 

        2022 Outpatient                 [facility]    [facility]    [id number]

365 Memoria



        10:30:00 10:30:00                                         55      dennis Mart

 

        2022 Outpatient                 [facility]    [facility]    [id number]

365 Memoria



        10:30:00 10:30:00                                         55      dennis Mart

 

        2022 Patient         Joana Adan 1.2.840.1 [id number]

[id number] 

Methodi



        00:00:00 00:00:00 Outreach                 42995.1.1         020     st



                                                3.430.2.7                 Hospit

a



                                                .3.237097                 l



                                                .8                      

 

        2022 Office          Ekeruo, 1.2.840.1 [id number]

0540 Methodi



        10:15:00 11:21:59 Visit           Daylin  32632.1.1         779     st



                                                3.430.2.7                 Hospit

a



                                                .3.860757                 l



                                                .8                      

 

        2022 Travel                  1.2.840.1 1.2.435.958 5833

[id number] Methodi



        00:00:00 00:00:00                         09447.1.1 350.1.13.43 779     

st



                                                3.430.2.7 0.2.7.3.698         Ho

spita



                                                .3.891895 084.8           l



                                                .8                      

 

        2022 [facility]         Suresh Pineda 1.2.840.1 1

[id number] 

[id number]                              Methodi



        12:16:00 16:05:00 Encounter         SolkeonstaceyMelissa chavis 72399.1.1   

      492     st



                                                3.430.2.7                 Hospit

a



                                                .3.627107                 l



                                                .8                      

 

        2022 Anesthesia         LiborioNaseem Herron 1.2.8

40.1 [id number] 

[id number]                              Methodi



        13:07:00 14:43:00 Event           Elaina Clark 24033.1.1         65

0     st



                                                3.430.2.7                 Hospit

a



                                                .3.018282                 l



                                                .8                      

 

        2022 Telephone         Saeidboom, 1.2.840.1 [id number] 2100

[id number] Methodi



        00:00:00 00:00:00                 Day[place]  16359.1.1         006     st



                                                3.430.2.7                 Hospit

a



                                                .3.133174                 l



                                                .8                      

 

        2022 Phone                   [facility] 2976 [id number] Memoria



        17:07:02 05:59:59 Message                 r       Medicine 16      l



                                                        Rudy martinez

 

        2022 Phone                   nullFlavo [facility] 346Mikhail

[id number] Memoria



        17:07:02 05:59:59 Message                 r       Medicine 16      l



                                                        Rudy Hines

michelle

 

        2022 Outpatient                 [facility]    [id number]

355 



        11:07:02 23:59:59                                         16      

 

        2022 Travel                  1.2.840.1 1.2.534.339 2048

[id number] Methodi



        00:00:00 00:00:00                         47878.1.1 350.1.13.43 996     

st



                                                3.430.2.7 0.2.7.3.698         Ho

spita



                                                .3.051646 084.8           l



                                                .8                      

 

        2022 [facility]  1.2.840.1 [id number]

7744 Methodi



        16:30:00 16:34:07 Visit           Brenna 60079.1.1         169     st



                                                3.430.2.7                 Hospit

a



                                                .3.651894                 l



                                                .8                      

 

        2022 [facility],  1.2.840.1 [id number]

27088 Methodi



        09:45:00 23:59:00 Encounter         Brenna 74241.1.1         979     

st



                                                3.430.2.7                 Hospit

a



                                                .3.333234                 l



                                                .8                      

 

        2022 Travel                  1.2.840.1 1.2.760.516 2297

161950 Methodi



        00:00:00 00:00:00                         19887.1.1 350.1.13.43 961     

st



                                                3.430.2.7 0.2.7.3.698         Ho

spita



                                                .3.958288 084.8           l



                                                .8                      

 

        2022 Phone                   nullFlavo [facility] Family 3467

[id number] Memoria



        19:33:53 05:59:59 Message                 r       Medicine 15      l



                                                        Rudy Rosariosylvie martinez

 

        2022 Phone                   nullFlavo [facility] Family Felipa

[id number] Memoria



        19:33:53 05:59:59 Message                 r       Medicine 15      l



                                                        Rudy Rosariosylvie martinez

 

        2022 Outpatient                 [facility]    [facility]    [id number]

355 



        13:33:53 23:59:59                                         15      

 

        2021 Phone                   [facility] 3467

[id number] Memoria



        19:38:03 05:59:59 Message                 r       Medicine 14      dennis Hines

n

 

        2021 Phone                   [facility] 3467

[id number] Memoria



        19:38:03 05:59:59 Message                 r       Medicine 14      dennis Hines

n

 

        2021 Outpatient                 [facility]    [facility]    [id number]

355 



        13:38:03 23:59:59                                         14      

 

        2021 Outpatient                 [facility]

[id number] Memoria



        21:15:00 05:59:59                         r       Medicine 53      dennis Hines

n

 

        2021 Outpatient                 [facility]

[id number] Memoria



        21:15:00 05:59:59                         r       Medicine 53      dennis Hines

n

 

        2021 Outpatient         Green,  [facility]    [facility]    [id number]

365 



        15:15:00 23:59:59                 Charisse MARTINEZ                 53      

 

        2021 Outpatient                 [facility]    [facility]    [id number]

365 Memoria



        15:15:00 15:15:00                                         53      dennis



                                                                        Barron

 

        2021 Office          Ahmed,  1.2.840.1 [id number]

2742 Methodi



        15:30:00 16:01:04 Visit           Brenna 36502.1.1         834     st



                                                3.430.2.7                 Hospit

a



                                                .3.836739                 l



                                                .8                      

 

        2021 Travel                  1.2.840.1 1.2.114.398 1988

508682 Methodi



        00:00:00 00:00:00                         29316.1.1 350.1.13.43 575     

st



                                                3.430.2.7 0.2.7.3.698         Ho

spita



                                                .3.919641 084.8           l



                                                .8                      

 

        2021 Between                 nullFlavo [facility] 3467

[id number] Memoria



        14:18:24 14:18:24 Visit                   r       Medicine 62      dennis Palacioberg         Donny martinez

 

        2021 Between                 [facility]Flavo [facility] 3467

[id number] Memoria



        14:18:24 14:18:24 Visit                   r       Medicine 62      l



                                                        Rudy martinez

 

        2021 Outpatient                 [facility]    [facility]    [id number]

375 



        08:18:24 08:18:24                                         62      

 

        2021 Between                 nullFlavo [facility] 3467

[id number] Memoria



        00:56:47 00:56:47 Visit                   r       Medicine 61      dennis martinez

 

        2021 Between                 [facility]Flavo [facility] 3467

[id number] Memoria



        00:56:47 00:56:47 Visit                   r       Medicine 61      dennis martinez

 

        2021 Outpatient                 [facility]    [facility]    [id number]

375 



        18:56:47 18:56:47                                         61      

 

        2021 Outpatient                 [facility]

[id number] Memoria



        20:00:00 05:59:59                         r       Medicine 52      dennis martinez

 

        2021 Outpatient                 [facility]

[id number] Memoria



        20:00:00 05:59:59                         r       Medicine 52      dennis martinez

 

        2021 Outpatient         Green,  [facility]    [facility]    [id number]

365 



        14:00:00 23:59:59                 Charisse MARTINEZ                 52      

 

        2021 Outpatient                 [facility]    [facility]    [id number]

365 Memoria



        14:00:00 14:00:00                                         52      dennis Mart

 

        2021 Phone                   nullFlavo [facility] 3467

[id number] Memoria



        21:38:38 05:59:59 Message                 r       Medicine 13      dennis martinez

 

        2021 Phone                   nullFlavo [facility] 3467

[id number] Memoria



        21:38:38 05:59:59 Message                 r       Medicine 13      dennis martinez

 

        2021 Outpatient                 [facility]    [id number]

355 



        15:38:38 23:59:59                                         13      

 

        2021 Office          Vadim, 1.2.840.1 [id number]

2744 Methodi



        11:00:00 11:44:58 Visit           Day[place]  58050.1.1         513     st



                                                3.430.2.7                 Hospit

a



                                                .3.209231                 l



                                                .8                      

 

        2021 Travel                  1.2.840.1 1.2.225.836 2446

[id number] Methodi



        00:00:00 00:00:00                         64376.1.1 350.1.13.43 808     

st



                                                3.430.2.7 0.2.7.3.698         Ho

spita



                                                .3.466976 084.8           l



                                                .8                      

 

        2021-11-15 2021 Between                 nullFlavo [facility] Family 3467

[id number] Memoria



        15:33:59 15:33:59 Visit                   r       Medicine 59      l



                                                        Rudy Hines

michelle

 

        2021-11-15 2021 Between                 nullFlavo [facility] Family 3467

[id number] Memoria



        15:33:59 15:33:59 Visit                   r       Medicine 59      l



                                                        Rudy Hines

michelle

 

        2021-11-15 2021 Outpatient                 [facility]    [id number]

375 



        09:33:59 09:33:59                                         59      

 

        2021 2021-10-05 Inpatient         JESSICA, [facility]     074     [id number]

[id number] [place]



        00:00:00 00:00:00                 MELISSA                    329     Method

i



                                                                        st

 

        2021 Outpatient         [facility] PRIV    PRIV    140

[id number] [facility]



        00:00:00 00:00:00                 n_J                     [id number] Medica

l

 

        2021 Outpatient         LAKHWINDER, [facility]     2100

[id number] [place]



        00:00:00 00:00:00                 JULIANNA                 104     Method

i



                                                                        st

 

        2021 Outpatient         DANIELLA,  [facility]     [id number]

765 [place]



        00:00:00 00:00:00                 RAZIUDDIN                 273     Meth

farhana



                                                                        st

 

        2021 Outpatient         EKERUO, [facility]     [id number]

411 [place]



        00:00:00 00:00:00                 DAYLIN                  787     Method

i



                                                                        

 

        2021 Outpatient         DAOURA, [facility]     [id number]

587 [place]



        00:00:00 00:00:00                 MAGY                 546     Method

i



                                                                        

 

        2021 Inpatient         MATHIVANAN, [facility]     064     [id number]

[id number] [place]



        00:00:00 00:00:00                 MELVIN                   275     Method

i



                                                                        

 

        2021 Outpatient         AHMED,  [facility]     [id number]

068 [place]



        00:00:00 00:00:00                 RAZIUDDIN                 199     Meth

farhana



                                                                        

 

        2021 Outpatient         EKERUO, [facility]     021     [id number]

337 [place]



        00:00:00 00:00:00                 DAYLIN                  640     Method

i



                                                                        

 

        2021 Outpatient         EKERUO, [facility]     [id number]

412 [place]



        00:00:00 00:00:00                 DAYLIN                  435     Method

i



                                                                        

 

        2021 Outpatient         EKERUO, [facility]     [id number]

412 [place]



        00:00:00 00:00:00                 DAYLIN                  537     Method

i



                                                                        

 

        2021 Outpatient         EKERUO, [facility]     [id number]

029 [place]



        00:00:00 00:00:00                 DAYLIN                  709     Method

i



                                                                        

 

        2021 Inpatient         SOLIPURAM, [facility]     064     [id number]

96823 [place]



        00:00:00 00:00:00                 MELISSA                    685     Method

i



                                                                        

 

        2021 Outpatient         AHMED,  [facility]     [id number]

046 [place]



        00:00:00 00:00:00                 RAZIUDDIN                 101     Meth

farhana



                                                                        

 

        2021 Outpatient                 [facility]

[id number] Memoria



        01:00:00 04:59:00                         r       [place] 58      l



                                                        [place]         Meredith

nn

 

        2021 Outpatient                 [facility]67

[id number] Memoria



        01:00:00 04:59:00                         r       [place] 58      l



                                                        [place]         Meredith

nn

 

        2021 Outpatient         AHMED,  [facility]    PUL     7558   

 [facility]



        20:00:00 23:59:00                 RAZIUDDIN                         

 

        2021 Outpatient         Ahmed,  [facility]    [facility]    [id number]

375 



        20:00:00 23:59:00                 Raziuddin                 58      

 

        2021 Outpatient         AHMED,  [facility]     [id number]

900 [place]



        00:00:00 00:00:00                 RAZIUDDIN                 598     Meth

farhana



                                                                        st

 

        2021-04-15 2021 Inpatient         ANAIS, [facility]     064     2100

[id number] [place]



        00:00:00 00:00:00                 MELVIN                   060     Method

i



                                                                        st

 

        2021 Outpatient                 [facility]     [id number]

422 [place]



        00:00:00 00:00:00                                         470     Method

i



                                                                        st

 

        2021 Outpatient                 [facility]

[id number] Memoria



        19:30:00 04:59:59                         r       Medicine 51      l



                                                        Rudy Hines

n

 

        2021 Outpatient                 [facility]

[id number] Memoria



        19:30:00 04:59:59                         r       Medicine 51      l



                                                        Rudy martinez

 

        2021 Outpatient         Green,  [facility]    [facility]    [id number]

365 



        14:30:00 23:59:59                 Charisse MARTINEZ                 51      

 

        2021 Outpatient                 [facility]    [facility]    [id number]

365 Memoria



        14:30:00 14:30:00                                         51      dennis Mart

 

        2021 Outpatient                 [facility]     [id number]

[address] [place] [address]



        00:00:00 00:00:00                                         398     Method

i



                                                                        st

 

        2021 Between                 nullFlavo [facility] 3467

[id number] Memoria



        13:38:03 13:38:03 Visit                   r       Medicine 57      l



                                                        Rudy martinez

 

        2021 Between                 [facility] 3467

[id number] Memoria



        13:38:03 13:38:03 Visit                   r       Medicine 57      l



                                                        Rudy martinez

 

        2021 Outpatient                 [facility]    MG    [id number]

375 



        08:38:03 08:38:03                                         57      

 

        2021 Outpatient         DANIELLA  [facility]     [id number]

980 [place]



        00:00:00 00:00:00                 RAZIUDDIN                 554     Meth

farhana



                                                                        st

 

        2021 Inpatient         ANAIS [facility]     025     

[id number] [place]



        00:00:00 00:00:00                 MELVIN                   541     Method

i



                                                                        st

 

        2021 Inpatient         ANAIS [facility]     Ayse     

[id number] [place]



        00:00:00 00:00:00                 MELVIN                   559     Method

i



                                                                        st

 

        2020 Inpatient         ANAIS [facility]     012     

[id number] [place]



        00:00:00 00:00:00                 MELVIN                   271     Method

i



                                                                        st

 

        2020 Outpatient                 [facility]

[id number] Memoria



        16:30:00 05:59:59                         r       Medicine 50      l



                                                        Rudy Hines

n

 

        2020 Outpatient                 [facility]

[id number] Memoria



        16:30:00 05:59:59                         r       Medicine 50      l



                                                        Rudy Hines

n

 

        2020 Outpatient         Green,  MG    MG    [id number]

365 



        10:30:00 23:59:59                 Charisse N                 50      

 

        2020 Outpatient                 [facility]    IE    [id number]

365 Memoria



        10:30:00 10:30:00                                         50      l



                                                                        Barron

 

        2020 Inpatient         SOLKEONURASUSIE, [facility]     012     [id number]

[id number] [place]



        00:00:00 00:00:00                 MELISSA                    464     Method

i



                                                                        st

 

        2020-10-23 2020 Inpatient         SOLSANTA, [facility]     012     [id number]

[id number] [place]



        00:00:00 00:00:00                 MELISSA                    557     Method

i



                                                                        st

 

        2020-10-23 2020-10-24 Outpt Diag                 nullFlavo [facility]    [id number]

[id number] Memoria



        18:10:00 04:59:00 Services                 r       Outpatient 06      l



                                                        Imaging         Barron



                                                        [place]         

 

        2020-10-23 2020-10-24 Outpt Diag                 nullFlavo [facility]    [id number]

[id number] Memoria



        18:10:00 04:59:00 Services                 r       Outpatient 06      l



                                                        Imaging         Barron



                                                        [place]         

 

        2020-10-23 2020-10-23 Outpatient         Jazz, [facility]    [facility]    [id number]

7385 



        13:10:00 23:59:00                 Abdi MARTINEZ                 06      

 

        2020-10-21 2020-10-22 Between                 nullFlavo [facility] 3467

[id number] Memoria



        17:58:19 17:58:19 Visit                   r       Medicine 56      l



                                                        Rudy Hines

n

 

        2020-10-21 2020-10-22 Between                 [facility]Flavo [facility] 3467

[id number] Memoria



        17:58:19 17:58:19 Visit                   r       Medicine 56      l



                                                        Rudy         Donny

n

 

        2020-10-21 2020-10-22 Outpatient                 [facility]    [facility]    [id number]

375 



        12:58:19 12:58:19                                         56      

 

        2020-10-13 2020-10-14 Outpatient                 [facility]Fl[facility]

[id number] Memoria



        15:15:00 04:59:59                         r       Medicine 49      l



                                                        Rudy Hines

n

 

        2020-10-13 2020-10-14 Outpatient                 [facility]

[id number] Memoria



        15:15:00 04:59:59                         r       Medicine 49      l



                                                        Rudy Hines

n

 

        2020-10-13 2020-10-13 Outpatient         Green,  [facility]    [facility]    [id number]

365 



        10:15:00 23:59:59                 Charisse MARTINEZ                 49      

 

        2020-10-13 2020-10-13 Outpatient                 [facility]    [facility]    [id number]

365 Memoria



        10:15:00 10:15:00                                         49      dennis Mart

 

        2020 Outpt Diag                 [facility]    [id number]

[id number] Memoria



        17:02:00 04:59:00 Services                 r       Outpatient 05      l



                                                        Imaging         [place]



                                                        [place]         

 

        2020 Outpt Diag                 [facility]    [id number]

05962 Memoria



        17:02:00 04:59:00 Services                 r       Outpatient 05      l



                                                        Imaging         [place]



                                                        [place]         

 

        2020 Outpatient         [facility]    [id number]

[id number] 



        12:02:00 23:59:00                 Abdi MARTINEZ                 05      

 

        2020 Ambulatory                 [facility]    [id number]

[id number] Memoria



        19:00:00 19:00:00 Pre-Reg                 r       Nephrology 46      l



                                                        Rudy martinez

 

        2020 Ambulatory                 [facility]    [id number]

17092 Memoria



        19:00:00 19:00:00 Pre-Reg                 r       Nephrology 46      l



                                                        Rudy martinez

 

        2020 Outpatient                 [facility]    [facility]    [id number]

365 Memoria



        14:00:00 14:00:00                                         46      dennis



                                                                        Barron

 

        2020 Outpatient         [facility]    346

[id number] 



        14:00:00 14:00:00                 Daca,                   46      



                                        Gabriela J                         

 

        2020 Outpatient                 [facility]    IE    [id number]

365 Memoria



        11:15:00 11:15:00                                         47      dennis



                                                                        Barron

 

        2020 Outpatient                 [facility]    [facility]    [id number]

365 Memoria



        11:15:00 11:15:00                                         47      dennis



                                                                        Barron

 

        2020 Outpatient                 [facility]    [id number]

[id number] Memoria



        19:45:00 04:59:59                         r       Nephrology 48      l



                                                        Rudy Hines

michelle

 

        2020 Outpatient                 [facility]    [id number]

[id number] Memoria



        19:45:00 04:59:59                         r       Nephrology 48      l



                                                        Rudy Hines

michelle

 

        2020 Outpatient         [facility]    346

[id number] 



        14:45:00 23:59:59                 Daca,                   48      



                                        Gabriela EDUARDO                         

 

        2020 Outpatient                 IE    IE    [id number]

365 Memoria



        14:45:00 14:45:00                                         48      dennis



                                                                        Barron

 

        2020 Outpatient         [facility]     [id number]

467 [place]



        00:00:00 00:00:00                 EMILIA                    832     [facility]

i



                                                                        st

 

        2020 Phone                   [facility] MG    [id number]

55 Memoria



        16:17:50 04:59:59 Message                 r       Nephrology 12      dennis Rosariosylvie martinez

 

        2020 Phone                   [facility] MG    [id number]

55 Memoria



        16:17:50 04:59:59 Message                 r       Nephrology 12      l



                                                        Rudy Hines

michelle

 

        2020 Outpatient                 [facility]    [facility]    [id number]

355 



        11:17:50 23:59:59                                         12      

 

        2020 Outpatient                 [facility] MG    [id number]

[id number] Memoria



        19:00:00 04:59:59                         r       Nephrology 41      dennis martinez

 

        2020 Outpatient                 [facility]    [id number]

[id number] Memoria



        19:00:00 04:59:59                         r       Nephrology 41      l



                                                        Rudy martinez

 

        2020 Outpatient         [address]    MG    [id number]

[id number] 



        14:00:00 23:59:59                 Daca,                   41      



                                        Gabriela J                         

 

        2020 Outpatient                 [facility]    IE    [id number]

365 Memoria



        14:00:00 14:00:00                                         41      dennis Mart

 

        2020 Phone                   [facility] MG    [id number]

55 Memoria



        18:35:51 04:59:59 Message                 r       Nephrology 11      dennis Mart

 

        2020 Phone                   [facility] MG    [id number]

55 Memoria



        18:35:51 04:59:59 Message                 r       Nephrology 11      dennis Mart

 

        2020 Outpatient                 [facility]    [facility]    [id number]

355 



        13:35:51 23:59:59                                         11      

 

        2020 Outpatient                 [facility]

[id number] Memoria



        15:15:00 04:59:59                         r       Medicine 45      l



                                                        Rudy Hines

michelle

 

        2020 Outpatient                 [facility]

[id number] Memoria



        15:15:00 04:59:59                         r       Medicine 45      l



                                                        Rudy Hines

n

 

        2020 Outpatient         Green,  MG    MG    [id number]

365 



        10:15:00 23:59:59                 Charisse MARTINEZ                 45      

 

        2020 Ambulatory                 [facility]

[id number] Memoria



        15:15:00 15:15:00 Pre-Reg                 r       Medicine 44      l



                                                        Rudy Hines

michelle

 

        2020 Ambulatory                 [facility]

[id number] Memoria



        15:15:00 15:15:00 Pre-Reg                 r       Medicine 44      l



                                                        Rudy         Donny martinez

 

        2020 Outpatient                 [facility]    IE    [id number]

365 Memoria



        10:15:00 10:15:00                                         45      dennis



                                                                        Barron

 

        2020 Outpatient                 [facility]    [facility]    [id number]

365 Memoria



        10:15:00 10:15:00                                         44      dennis Mart

 

        2020 Outpatient         Green,  MG    MG    [id number]

365 



        10:15:00 10:15:00                 Charisse N                 44      

 

        2020 Phone                   [facility] MG    [id number]

55 Memoria



        13:23:32 04:59:59 Message                 r       Nephrology 10      dennis Grant         Donny martinez

 

        2020 Phone                   [facility] MG    [id number]

55 Memoria



        13:23:32 04:59:59 Message                 r       Nephrology 10      l



                                                        Grant         Donny martinez

 

        2020 Outpatient                 MG    MG    [id number]

355 



        08:23:32 23:59:59                                         10      

 

        2020 Between                 [facility] Family 3467

[id number] Memoria



        14:14:54 14:14:54 Visit                   r       Medicine 52      l



                                                        Rudy         Donny martinez

 

        2020 Between                 [facility] Family 3467

[id number] Memoria



        14:14:54 14:14:54 Visit                   r       Medicine 52      l



                                                        Grant         Donny martinez

 

        2020 Outpatient                 MG    MG    [id number]

375 



        09:14:54 09:14:54                                         52      

 

        2020 Outpatient                 [facility]    IE    [id number]

365 Memoria



        11:30:00 11:30:00                                         43      dennis Mart

 

        2020 Outpatient                 [facility]    IE    [id number]

365 Memoria



        11:30:00 11:30:00                                         43      dennis Mart

 

        2020 2020-04-15 Phone                   [facility] Family 3467

[id number] Memoria



        20:00:15 04:59:59 Message                 r       Medicine 09      l



                                                        Grant         Donny martinez

 

        2020 2020-04-15 Phone                   [facility] 3467

[id number] Memoria



        20:00:15 04:59:59 Message                 r       Medicine 09      ednnis Hines

michelle

 

        2020 Outpatient                 [facility]    MG    [id number]

355 



        15:00:15 23:59:59                                         09      

 

        2020-04-10 2020 Phone                   [facility] 3467

[id number] Memoria



        17:18:28 04:59:59 Message                 r       Medicine 08      dennis Hines

michelle

 

        2020-04-10 2020 Phone                   [facility] 3467

[id number] Memoria



        17:18:28 04:59:59 Message                 r       Medicine 08      dennis Hines

michelle

 

        2020-04-10 2020 Phone                   [facility] MG    [id number]

55 Memoria



        13:26:55 04:59:59 Message                 r       Nephrology 07      denins Hines

michelle

 

        2020-04-10 2020 Phone                   [facility] MG    [id number]

55 Memoria



        13:26:55 04:59:59 Message                 r       Nephrology 07      dennis Hines

michelle

 

        2020-04-10 2020 Outpatient                 [facility]    [facility]    [id number]

355 



        12:18:28 23:59:59                                         08      

 

        2020-04-10 2020 Outpatient                 [facility]    [facility]    [id number]

355 



        08:26:55 23:59:59                                         07      

 

        2020 Outpatient                 [facility] 3

[id number] Memoria



        20:15:00 05:59:59                         r       Medicine 42      l



                                                        Rudy Hines

michelle

 

        2020 Outpatient                 [facility] 3

[id number] Memoria



        20:15:00 05:59:59                         r       Medicine 42      l



                                                        Rudy Hines

michelle

 

        2020 Outpatient         Green,  [facility]    MG    [id number]

365 



        14:15:00 23:59:59                 Charisse N                 42      

 

        2020 Outpatient                 [facility]    [facility]    [id number]

365 Memoria



        14:15:00 14:15:00                                         Gildardo Mart

 

        2020 Phone                   [facility] 3467

[id number] Memoria



        14:22:27 05:59:59 Message                 r       Medicine 06      dennis Hines

michelle

 

        2020 Phone                   [facility] 3467

[id number] Memoria



        14:22:27 05:59:59 Message                 r       Medicine 06      dennis Hines

n

 

        2020 Outpatient                 MG    [facility]    [id number]

355 



        08:22:27 23:59:59                                         06      

 

        2019 Phone                   [facility] 3467

[id number] Memoria



        16:06:04 05:59:59 Message                 r       Medicine 05      dennis martinez

 

        2019 Phone                   [facility] 3467

[id number] Memoria



        16:06:04 05:59:59 Message                 r       Medicine 05      dennis martinez

 

        2019 Outpatient                 MG    [facility]    [id number]

355 



        10:06:04 23:59:59                                         05      

 

        2019 Between                 [facility] MG    [id number]

75 Memoria



        20:05:03 20:05:03 Visit                   r       Nephrology 45      dennis Arguello         [place]

 

        2019 Between                 [facility] MG    [id number]

75 Memoria



        20:05:03 20:05:03 Visit                   r       Nephrology 45      l



                                                        Saundra         Barron

 

        2019 Outpatient                 MG    [facility]    [id number]

375 



        14:05:03 14:05:03                                         45      

 

        2019 Outpatient                 [facility] MG    [id number]

[id number] Memoria



        20:45:00 05:59:59                         r       Nephrology 38      dennis Hines

michelle

 

        2019 Outpatient                 [facility] MG    [id number]

[id number] Memoria



        20:45:00 05:59:59                         r       Nephrology 38      dennis Hines

michelle

 

        2019 Outpatient         [place]    [facility]    346

[id number] 



        14:45:00 23:59:59                 Brianda Gallegos                         

 

        2019 Outpatient                 [facility]    [id number]

365 Memoria



        14:45:00 14:45:00                                         38      dennis Mart

 

        2019 Between                 [facility] MG    [id number]

75 Memoria



        00:07:10 00:07:10 Visit                   r       Nephrology 43      l



                                                        Saundra Mart

 

        2019 Between                 [facility] MG    [id number]

75 Memoria



        00:07:10 00:07:10 Visit                   r       Nephrology 43      dennis Mart

 

        2019 Outpatient                 [facility]    [id number]

375 



        18:07:10 18:07:10                                         43      

 

        2019 Outpatient                 IE    IE    [id number]

365 Memoria



        09:00:00 09:00:00                                         39      dennis Mart

 

        2019 Outpatient                 IE    IE    [id number]

365 Memoria



        09:00:00 09:00:00                                         39      dennis



                                                                        Barron

 

        2019 Between                 [facility] 3467

[id number] Memoria



        21:47:12 21:47:12 Visit                   r       Medicine 41      dennis Rosariosylvie martinez

 

        2019 Between                 [facility] 3467

[id number] Memoria



        21:47:12 21:47:12 Visit                   r       Medicine 41      dennis Grant         Donny martinez

 

        2019 Outpatient                 [facility]    [id number]

375 



        15:47:12 15:47:12                                         41      

 

        2019-10-29 2019-10-30 Between                 [facility] 3467

[id number] Memoria



        22:13:13 22:13:13 Visit                   r       Medicine 40      dennis Hines

michelle

 

        2019-10-29 2019-10-30 Between                 [facility] 3467

[id number] Memoria



        22:13:13 22:13:13 Visit                   r       Medicine 40      dennis Grant         Donny martinez

 

        2019-10-29 2019-10-30 Outpatient                 [facility]    [id number]

375 



        17:13:13 17:13:13                                         40      

 

        2019-10-25 2019-10-26 Outpatient                 [facility]

[id number] Memoria



        14:45:00 04:59:59                         r       Medicine 40      dennis Rosariosylvie martinez

 

        2019-10-25 2019-10-26 Outpatient                 [facility]

[id number] Memoria



        14:45:00 04:59:59                         r       Medicine 40      l



                                                        Grant         Donny martinez

 

        2019-10-25 2019-10-25 Outpatient         Green,  MG    [facility]    [id number]

365 



        09:45:00 23:59:59                 Charisse N                 40      

 

        2019-10-25 2019-10-25 Outpatient                 [facility]    [facility]    [id number]

365 [facility]oria



        09:45:00 09:45:00                                         40      dennis



                                                                        Barron

 

        2019-10-17 2019-10-17 Ambulatory                 [facility]

[id number] Memoria



        16:00:00 16:00:00 Pre-Reg                 r       Medicine 36      dennis Hines

n

 

        2019-10-17 2019-10-17 Ambulatory                 [facility]

[id number] Memoria



        16:00:00 16:00:00 Pre-Reg                 r       Medicine 36      dennis Hines

n

 

        2019-10-17 2019-10-17 Outpatient                 [facility]    [facility]    [id number]

365 Memoria



        11:00:00 11:00:00                                         36      dennis



                                                                        Barron

 

        2019-10-17 2019-10-17 Outpatient         Green,  [facility]    MG    [id number]

365 



        11:00:00 11:00:00                 Charisse N                 36      

 

        2019-10-10 2019-10-11 Outpatient                 null[facility]avo MG    [id number]

[id number] Memoria



        15:00:00 04:59:59                         r       Nephrology 35      dennis Hines

n

 

        2019-10-10 2019-10-11 Outpatient                 null[facility]avo MG    [id number]

[id number] Memoria



        15:00:00 04:59:59                         r       Nephrology 35      dennis Hines

n

 

        2019-10-10 2019-10-10 Outpatient         Green,  MG    MG    [id number]

365 



        10:00:00 23:59:59                 Charisse N                 35      

 

        2019-10-10 2019-10-10 Outpatient                 [facility]    IE    [id number]

365 Memoria



        12:00:00 12:00:00                                         37      dennis



                                                                        Barron

 

        2019-10-10 2019-10-10 Outpatient                 [facility]    IE    [id number]

365 Memoria



        12:00:00 12:00:00                                         37      dennis



                                                                        Barron

 

        2019-10-10 2019-10-10 Outpatient                 [facility]    [facility]    [id number]

365 Memoria



        10:00:00 10:00:00                                         35      dennis



                                                                        Barron

 

        2019 Phone                   [facility] 3467

[id number] Memoria



        15:15:06 04:59:59 Message                 r       Medicine 04      dennis Hines

n

 

        2019 Phone                   [facility] 3467

[id number] Memoria



        15:15:06 04:59:59 Message                 r       Medicine 04      dennis martinez

 

        2019 Phone                   [facility] MG    [id number]

55 Memoria



        15:10:51 04:59:59 Message                 r       Nephrology 03      dennis martinez

 

        2019 Phone                   [facility]    [id number]

55 Memoria



        15:10:51 04:59:59 Message                 r       Nephrology 03      dennis martinez

 

        2019 Outpatient                 MG    MG    [id number]

355 



        10:15:06 23:59:59                                         04      

 

        2019 Outpatient                 MG    MG    [id number]

355 



        10:10:51 23:59:59                                         03      

 

        2019 Ambulatory                 [facility] MG    [id number]

[id number] Memoria



        20:00:00 20:00:00 Pre-Reg                 r       Nephrology 34      dennis martinez

 

        2019 Ambulatory                 [facility] MG    [id number]

[id number] Memoria



        20:00:00 20:00:00 Pre-Reg                 r       Nephrology 34      dennis martinez

 

        2019 Outpatient                 [facility]    [id number]

365 Memoria



        15:00:00 15:00:00                                         34      dennis Mart

 

        2019 Outpatient         [place]    [facility]    346

[id number] 



        15:00:00 15:00:00                 Tate Gallegos                         

 

        2019 Between                 [facility] MG    [id number]

75 Memoria



        20:15:16 20:15:16 Visit                   r       Nephrology 34      dennis Mart

 

        2019 Between                 [facility] MG    [id number]

75 Memoria



        20:15:16 20:15:16 Visit                   r       Nephrology 34      dennis Mart

 

        2019 Outpatient                 MG    MG    [id number]

375 



        15:15:16 15:15:16                                         34      

 

        2019 Phone                   [facility] MG    [id number]

55 Memoria



        15:29:54 04:59:59 Message                 r       Nephrology 02      dennis Mart

 

        2019 Phone                   [facility] MG    [id number]

55 Memoria



        15:29:54 04:59:59 Message                 r       Nephrology 02      dennis Mart

 

        2019 Outpatient                 [facility]    MG    [id number]

355 



        10:29:54 23:59:59                                         02      

 

        2019 Ambulatory                 null[facility]avo MG    [id number]

[id number] Memoria



        16:30:00 16:30:00 Pre-Reg                 r       Nephrology 29      l



                                                        Rudy martinez

 

        2019 Ambulatory                 [facility] MG    [id number]

[id number] Memoria



        16:30:00 16:30:00 Pre-Reg                 r       Nephrology 29      dennis martinez

 

        2019 Ambulatory                 [facility] MG    [id number]

[id number] Memoria



        16:15:00 16:15:00 Pre-Reg                 r       Nephrology 30      dennis martinez

 

        2019 Ambulatory                 [facility] MG    [id number]

[id number] Memoria



        16:15:00 16:15:00 Pre-Reg                 r       Nephrology 30      dennis martinez

 

        2019 Ambulatory                 [facility]

[id number] Memoria



        15:15:00 15:15:00 Pre-Reg                 r       Medicine 31      dennis martinez

 

        2019 Ambulatory                 [facility]

[id number] Memoria



        15:15:00 15:15:00 Pre-Reg                 r       Medicine 31      dennis martinez

 

        2019 Outpatient                 [facility]    [facility]    [id number]

365 [facility]oria



        11:30:00 11:30:00                                         29      dennis



                                                                        Bar[place]

 

        2019 [facility]         [facility]    [id number]

[id number] 



        11:30:00 11:30:00                 Marco A Gallegos                         

 

        2019 Outpatient                 MHIE    MHIE    [id number]

365 [facility]



        11:15:00 11:15:00                                         30      dennis



                                                                        Barron

 

        2019 Outpatient         [place]- MG    MG    346

[id number] 



        11:15:00 11:15:00                 Jamil Gallegos                         

 

        2019 Outpatient                 MHIE    MHIE    [id number]

365 [facility]



        10:15:00 10:15:00                                         31      dennis



                                                                        [place]

 

        2019 Outpatient         Green,  [facility]    [id number]

365 



        10:15:00 10:15:00                 Charisse N                       

 

        2019 Inpatient PABLITO ADEN   [facility]     [place]    [id number]

[address] [place]



        10:05:00 17:55:00                 GOPAL                         [facility]

[facility]

 

        2019 Outpatient PABLITO ADEN   [facility]     TELE    [id number]

[address] [place]



        21:36:00 19:00:00                 GOPAL                         [facility]

[facility]

 

        2019 Outpatient                 nullFlavo MH Urgent 346

[id number] Memoria



        20:40:00 04:59:59                         r       Care    33      l



                                                        [facility]

 

        2019 Outpatient                 nullFlavo MH Urgent 346

[id number] Memoria



        20:40:00 04:59:59                         r       Care    33      l



                                                        Candace         [place]

 

        2019 Outpatient         Van     MG    MG    [id number]

365 



        15:40:00 23:59:59                 Mitch Brooke      



                                        Yousif kirkpatrick                         



                                        Esc[place]                           

 

        2019 Outpatient                 MHIE    IE    [id number]

365 [facility]



        15:40:00 15:40:00                                         33      dennis Mart

 

        2019 Outpatient                 nullFlavo MG Family 3

[id number] Memoria



        15:15:00 04:59:59                         r       Medicine 32      l



                                                        Rudy martinez

 

        2019 Outpatient                 nullFlavo [facility] Family 3

[id number] Memoria



        15:15:00 04:59:59                         r       Medicine 32      l



                                                        Rudy martinez

 

        2019 Outpatient         [place]- MG    MG    346

[id number] 



        10:15:00 23:59:59                 Kristie Gallegos                         

 

        2019 Outpatient                 MHIE    MHIE    [id number]

365 [facility]



        10:15:00 10:15:00                                         32      dennis Mart

 

        2019 Between                 nullFlavo MG Family 3467

[id number] Memoria



        19:00:16 19:00:16 Visit                   r       Medicine 29      dennis Hines

n

 

        2019 Between                 nullFlavo MG Family 3467

[id number] Memoria



        19:00:16 19:00:16 Visit                   r       Medicine 29      dennis Hines

n

 

        2019 Outpatient                 MG    MG    [id number]

375 



        14:00:16 14:00:16                                         29      

 

        2019 2019-03-15 Between                 nullFlavo MG    [id number]

75 [facility]



        15:02:37 15:02:37 Visit                   r       Nephrology 27      dennis Hines

n

 

        2019 2019-03-15 Between                 nullFlavo MG    [id number]

75 [facility]



        15:02:37 15:02:37 Visit                   r       Nephrology 27      dennis Hines

n

 

        2019 2019-03-15 Outpatient                 MG    MG    [id number]

375 



        10:02:37 10:02:37                                         27      

 

        2019 2019-03-15 Outpatient                 nullFlavo MG    [id number]

[id number] Memoria



        18:45:00 04:59:59                         r       Nephrology 28      dennis Hines

n

 

        2019 2019-03-15 Outpatient                 nullFlavo MG    [id number]

[id number] Memoria



        18:45:00 04:59:59                         r       Nephrology 28      dennis Hines

n

 

        2019 2019-03-15 Outpatient                 nullFlavo MG Family 3

[id number] Memoria



        14:15:00 04:59:59                         r       Medicine 22      dennis martinez

 

        2019 2019-03-15 Outpatient                 nullFlavo MG Family 3

[id number] Memoria



        14:15:00 04:59:59                         r       Medicine 22      dennis Hines

n

 

        2019 Outpatient         [place]- MG    MG    346

[id number] 



        13:45:00 23:59:59                 Husam Gallegos                         

 

        2019 Outpatient         Green,  MG    MG    [id number]

365 



        09:15:00 23:59:59                 Charisse MARTINEZ                 22      

 

        2019 Outpatient                 MHIE    MHIE    [id number]

365 [facility]



        13:45:00 13:45:00                                         Husam Mart

 

        2019 Outpatient                 MHIE    MHIE    [id number]

365 [facility]



        09:15:00 09:15:00                                         22      dennis Mart

 

        2019 Between                 nullFlavo MG    [id number]

75 [facility]



        23:59:47 23:59:47 Visit                   r       Nephrology 26      dennis Hines

michelle

 

        2019 Between                 nullFlavo MG    [id number]

75 [facility]



        23:59:47 23:59:47 Visit                   r       Nephrology 26      dennis Hines

michelle

 

        2019 Outpatient                 [facility]    [id number]

375 



        18:59:47 18:59:47                                         26      

 

        2019 Between                 nullFlavo MG    [id number]

75 [facility]



        22:00:33 22:00:33 Visit                   r       Nephrology 24      dennis Hines

michelle

 

        2019 Between                 nullFlavo MG    [id number]

75 [facility]



        22:00:33 22:00:33 Visit                   r       Nephrology 24      dennis Hines

michelle

 

        2019 Outpatient                 [facility]    [id number]

375 



        16:00:33 16:00:33                                         24      

 

        2019 Between                 nullFlavo MG    [id number]

75 [facility]



        04:48:44 04:48:44 Visit                   r       Nephrology 23      dennis Hines

michelle

 

        2019 Between                 nullFlavo [facility]    [id number]

75 [facility]



        04:48:44 04:48:44 Visit                   r       Nephrology 23      dennis Hines

michelle

 

        2019 Outpatient                 [facility]    [id number]

375 



        22:48:44 22:48:44                                         23      

 

        2019 Ambulatory                 nullFlavo MG    [id number]

[id number] Memoria



        20:30:00 20:30:00 Pre-Reg                 r       Nephrology 27      dennis Hines

michelle

 

        2019 Ambulatory                 nullFlavo MG    [id number]

[id number] Memoria



        20:30:00 20:30:00 Pre-Reg                 r       Nephrology 27      dennis Hines

michelle

 

        2019 Ambulatory                 nullFlavo MG    [id number]

[id number] Memoria



        18:40:00 18:40:00 Pre-Reg                 r       Nephrology 24      dennis martinez

 

        2019 Ambulatory                 nullFlavo MG    [id number]

[id number] Memoria



        18:40:00 18:40:00 Pre-Reg                 r       Nephrology 24      dennis martinez

 

        2019 Outpatient                 MHIE    MHIE    [id number]

365 [facility]



        14:30:00 14:30:00                                         27      dennis Rosarioann

 

        2019 Outpatient         [place]- MG    MG    346

[id number] 



        14:30:00 14:30:00                 Zuleyma Gallegos                         

 

        2019 Outpatient         [place] MG    MG    346

[id number] 



        14:30:00 14:30:00                 Zuleyma Gallegos                         

 

        2019 Outpatient                 MHIE    MHIE    [id number]

365 [facility]



        12:40:00 12:40:00                                         24      dennis Rosarioann

 

        2019 Outpatient         [place]- MG    MG    346

[id number] 



        12:40:00 12:40:00                 SamsonMichael cade                         

 

        2019 Outpatient         [place] MG    MG    346

[id number] 



        12:40:00 12:40:00                 SamsonMichael cade                         

 

        2019 Ambulatory                 nullFlavo MG    [id number]

[id number] Memoria



        15:30:00 15:30:00 Pre-Reg                 r       Internal 25      l



                                                        Medicine         Barron Grant         

 

        2019 Ambulatory                 nullFlavo MG    [id number]

12014 Memoria



        15:30:00 15:30:00 Pre-Reg                 r       Internal 25      l



                                                        Manuel Grant         

 

        2019 Outpatient                 MHIE    MHIE    [id number]

365 [facility]



        09:30:00 09:30:00                                         25      dennis



                                                                        [place]

 

        2019 Outpatient                 MH[facility]    MHMG    [id number]

365 



        09:30:00 09:30:00                                         25      

 

        2018 2018-10-20 Ambulatory                 nullFlavo MHMG    [id number]

[id number] Memoria



        12:45:00 12:45:00 Pre-Reg                 r       Internal 23      l



                                                        Medicine         Barron Grant         

 

        2018 2018-10-20 Ambulatory                 nullFlavo MG    [id number]

[id number] Memoria



        12:45:00 12:45:00 Pre-Reg                 r       Internal 23      l



                                                        Manuel Grant         

 

        2018 2018-10-20 Outpatient                 MG    MG    [id number]

365 



        07:45:00 07:45:00                                         23      

 

        2018 Outpatient                 nullFlavo MG    [id number]

[id number] Memoria



        19:15:00 04:59:59                         r       Nephrology 26      l



                                                        Rudy Hines

michelle

 

        2018 Outpatient                 nullFlavo MG    [id number]

[id number] Memoria



        19:15:00 04:59:59                         r       Nephrology 26      dennis Hines

michelle

 

        2018 Outpatient                 nullFlavo MG    [id number]

[id number] Memoria



        18:00:00 04:59:59                         r       Nephrology 21      dennis Hines

michelle

 

        2018 Outpatient                 nullFlavo MG    [id number]

[id number] Memoria



        18:00:00 04:59:59                         r       Nephrology 21      dennis Hines

michelle

 

        2018 Outpatient         [facility]    346

[id number] 



        14:15:00 23:59:59                 Daca,                   26      



                                        [facility]                         

 

        2018 [facility]

[id number] 



        13:00:00 23:59:59                 Daca,                   21      



                                        Gabriela J                         

 

        2018 Outpatient                 IE    IE    [id number]

365 [facility]



        14:15:00 14:15:00                                         26      dennis



                                                                        [place]

 

        2018 Outpatient                 MHIE    IE    [id number]

365 [facility]



        13:00:00 13:00:00                                         21      dennis



                                                                        [place]

 

        2018 Outpatient                 MHIE    IE    [id number]

365 [facility]



        07:45:00 07:45:00                                         23      dennis



                                                                        [place]

 

        2018 Outpatient                 nullFlavo MG Family 3

[id number] Memoria



        18:30:00 04:59:59                         r       Medicine 20      dennis Hines

michelle

 

        2018 Outpatient                 nullFlavo MG Family 3

[id number] Memoria



        18:30:00 04:59:59                         r       Medicine 20      dennis Hines

n

 

        2018 Outpatient         Green,  MHMG    MG    [id number]

365 



        13:30:00 23:59:59                 Charisse N                 20      

 

        2018 Outpatient                 MHIE    MHIE    [id number]

365 [facility]



        13:30:00 13:30:00                                         20      dennis



                                                                        [place]

 

        2018 Outpatient                 nullFlavo MHMG    [id number]

[id number] Memoria



        16:00:00 04:59:59                         r       Nephrology 19      dennis Hines

n

 

        2018 Outpatient                 nullFlavo MHMG    [id number]

[id number] Memoria



        16:00:00 04:59:59                         r       Nephrology 19      dennis Hines

n

 

        2018 Outpatient         [place]    [facility]    346

[id number] 



        11:00:00 23:59:59                 Daca,                   19      



                                        Gabriela J                         

 

        2018 Outpatient         [place]    MG    346

[id number] 



        11:00:00 23:59:59                 Daca,                   19      



                                        Gabriela J                         

 

        2018 Outpatient                 MHIE    MHIE    [id number]

365 [facility]



        11:00:00 11:00:00                                         19      dennis



                                                                        [place]

 

        2018 Outpatient                 nullFlavo [facility] Family 3

[id number] Memoria



        15:00:00 04:59:59                         r       Medicine 18      dennis Hines

n

 

        2018 Outpatient                 nullFlavo [facility] Family 3

[id number] Memoria



        15:00:00 04:59:59                         r       Medicine 18      dennis Hines

n

 

        2018 Outpatient         Green,  [facility]    MHMG    [id number]

365 



        10:00:00 23:59:59                 Charisse N                 18      

 

        2018 Outpatient         Green,  [facility]    MHMG    [id number]

365 



        10:00:00 23:59:59                 Charisse N                 18      

 

        2018 Outpatient                 MHIE    MHIE    [id number]

365 [facility]



        10:00:00 10:00:00                                         18      dennis Mart

 

        2017 Outpatient                 MHIE    MHIE    [id number]

365 [facility]



        10:40:00 10:40:00                                         16      dennis



                                                                        [place]

 

        2017 Outpatient                 MHIE    MHIE    [id number]

365 [facility]



        10:40:00 10:40:00                                         16      dennis



                                                                        [place]

 

        2017 Outpatient                 MHIE    MHIE    [id number]

365 [facility]



        11:30:00 11:30:00                                         17      dennis Mart

 

        2017 Outpatient                 MHIE    MHIE    [id number]

365 [facility]



        11:30:00 11:30:00                                         17      dennis



                                                                        [place]

 

        2017 Outpatient                 MHIE    MHIE    [id number]

365 [facility]



        11:40:00 11:40:00                                         11      dennis Mart

 

        2017 Outpatient                 MHIE    MHIE    [id number]

365 [facility]



        11:40:00 11:40:00                                         11      dennis Mart

 

        2017 Outpatient                 MHIE    MHIE    [id number]

365 [facility]



        14:30:00 14:30:00                                         15      dennis Mart

 

        2017 Outpatient                 MHIE    MHIE    [id number]

365 [facility]



        14:30:00 14:30:00                                         15      dennis Mart

 

        2017 Outpatient                 MHIE    MHIE    [id number]

365 [facility]



        10:00:00 10:00:00                                         08      dennis Mart

 

        2017 Outpatient                 MHIE    MHIE    [id number]

365 [facility]



        10:00:00 10:00:00                                         08      dennis Mart

 

        2017 Bedded                  [facility]Flavo [facility] [id number]

375 [facility]



        11:48:35 14:30:00 Outpatient                 xuan Mart 13      l



                                                        [place]         Meredith

nn

 

        2017 Bedded                  [facility]Flavo [facility] [id number]

375 [facility]



        11:48:35 14:30:00 Outpatient                 xuan Mart 13      l



                                                        [place]         Meredith

nn

 

        2017 Outpatient         OSWALDO Cunningham    [facility]    [id number]

[id number] 



        05:48:35 08:30:00                 Randy Velasco      

 

        2017 Outpatient                 MHIE    MHIE    [id number]

365 [facility]



        13:15:00 13:15:00                                         14      dennis Mart

 

        2017 Outpatient                 MHIE    MHIE    [id number]

365 [facility]



        13:15:00 13:15:00                                         14      dennis



                                                                        [place]

 

        2016 Outpatient                 MHIE    MHIE    [id number]

365 [facility]



        09:30:00 09:30:00                                         12      dennis



                                                                        [place]

 

        2016 Outpatient                 MHIE    MHIE    [id number]

365 [facility]



        09:30:00 09:30:00                                         13      dennis



                                                                        [place]

 

        2016 Outpatient                 MHIE    MHIE    [id number]

365 [facility]



        09:30:00 09:30:00                                         12      dennis



                                                                        [place]

 

        2016 Outpatient                 MHIE    MHIE    [id number]

365 [facility]



        09:30:00 09:30:00                                         13      dennis



                                                                        [place]

 

        2016 Outpatient                 MHIE    MHIE    [id number]

365 [facility]



        10:20:00 10:20:00                                         09      dennis



                                                                        [place]

 

        2016 Outpatient                 MHIE    MHIE    [id number]

365 [facility]



        10:20:00 10:20:00                                         09      dennis



                                                                        [place]

 

        2016 Outpatient                 MHIE    MHIE    [id number]

365 [facility]



        13:00:00 13:00:00                                         04      l



                                                                        [place]

 

        2016 Outpatient                 MHIE    MHIE    [id number]

365 [facility]



        13:00:00 13:00:00                                         04      l



                                                                        Barron

 

        2016 Outpatient                 MHIE    MHIE    [id number]

365 [facility]



        11:15:00 11:15:00                                         06      dennis



                                                                        [place]

 

        2016 Outpatient                 MHIE    MHIE    [id number]

365 [facility]



        11:15:00 11:15:00                                         06      dennis



                                                                        [place]

 

        2016 OP Therapy                 nullFlavo SMR     [id number]

[id number] Memoria



        13:00:00 04:59:00 Patients                 xuan Rajput 03      dennis



                                                        Jl           Barron

 

        2016 OP Therapy                 nullFlavo SMR     [id number]

[id number] Memoria



        13:00:00 04:59:00 Patients                 r       Regulo 03      l



                                                        Jl           Barron

 

        2016 Outpatient         Do   2.16.840. 2.16.840.1. 3

[id number] 



        08:00:00 23:59:00                 Yoan CHAVIS [id number]. [id number] 03    

  



                                                [phone] 5.55            

 

        2016 OP Therapy                 nullFlavo SMR     [id number]

[id number] Memoria



        17:39:00 04:59:00 Patients                 xuan Rajput 02      dennis Mart

 

        2016 OP Therapy                 nullFlavo SMR     [id number]

[id number] Memoria



        17:39:00 04:59:00 Patients                 xuan Rajput 02      dennis Mart

 

        2016 Outpatient         Lei,   2.16.840. 2.16.840.1. 3

[id number] 



        12:39:00 23:59:00                 Yoan CHAVIS [id number]. [id number] 02    

  



                                                [phone] 5.55            

 

        2016 OP Therapy                 nullFlavo [facility] [place] 346

[id number] Memoria



        19:00:00 04:59:00 Patients                 r       [place]         dennis Mart

 

        2016 OP Therapy                 nullFlavo S[facility] [place] 346

[id number] Memoria



        19:00:00 04:59:00 Patients                 xuan Ambrosio         dennis Mart

 

        2016 Outpatient         Lei,   2.16.840. 2.16.840.1. 3

[id number] 



        14:00:00 23:59:00                 Yoan CHAVIS [id number]. [id number] 01    

  



                                                [id number] 5.69            

 

        2016 Outpt Diag                 nullFlavo [facility]    [id number]

[id number] Memoria



        16:14:00 04:59:00 Services                 r       Outpatient 04      l



                                                        Imaging         [place]ron



                                                        [place]         

 

        2016 Outpt Diag                 nullFlavo [facility]    [id number]

[id number] Memoria



        16:14:00 04:59:00 Services                 r       Outpatient 04      l



                                                        Imaging         [place]



                                                        [place]         

 

        2016 [facility]    346

[id number] 



        11:14:00 23:59:00                 Roland Gallegos                         

 

        2016 OP Therapy                 nullFlavo [facility] [place] 346

[id number] Memoria



        19:00:00 04:59:00 Patients                 r       [place]   00      dennis Mart

 

        2016 OP Therapy                 nullFlavo [facility] 346

[id number] Memoria



        19:00:00 04:59:00 Patients                 r       [place]   00      dennis Mart

 

        2016 Outpatient         Do   2.16.840. 2.16.840.1. 3

[id number] 



        14:00:00 23:59:00                 Yoan CHAVIS [id number]. [id number]    

  



                                                [id number] 5.69            

 

        2016 Outpatient                 IE    IE    [id number]

365 [facility]



        10:20:00 10:20:00                                         01      dennis



                                                                        [place]ron

 

        2016 Outpatient                 MHIE    MHIE    [id number]

365 [facility]



        10:20:00 10:20:00                                         01      dennis Mart

 

        2016-05-10 2016 Outpt Diag                 nullFlavo [facility]    10106

[id number] [facility]



        14:59:00 04:59:00 Services                 r       Outpatient 03      l



                                                        Imaging         Barron Zarate            

 

        2016-05-10 2016 Outpt Diag                 nullFlavo [facility]    [id number]

83653 [facility]



        14:59:00 04:59:00 Services                 r       Outpatient 03      l



                                                        Imaging         Barron Zarate            

 

        2016-05-10 2016-05-10 Outpatient         [place] 28    28    346

[id number] 



        09:59:00 23:59:00                 [place]                  2016 Outpt Diag                 nullFlavo [facility]    [id number]

79186 [facility]



        18:11:00 04:59:00 Services                 r       Outpatient 01      l



                                                        Imaging         Barron



                                                        [place]         

 

        2016 Outpt Diag                 nullFlavo [facility]    35161

14543 [facility]



        18:11:00 04:59:00 Services                 r       Outpatient 01      l



                                                        Imaging         Barron



                                                        [place]         

 

        2016 Outpatient         [place] 29    29    346

[id number] 



        13:11:00 23:59:00                 [place]                  01      

 

        2016-03-10 2016-03-10 Outpatient                 nullFlavo [facility]    [id number]

   [facility]



        12:50:31 19:25:00                         r                       dennis



                                                                        [place]

 

        2016-03-10 2016-03-10 Outpatient                 2.16.840. 2.16.840.1. 3

[id number]   [facility]



        12:50:31 19:25:00                         [id number]         

l



                                                [id number] 81.2000         Donny

n



                                                00                      [facility]



                                                                        l



                                                                        [facility]osp[facility]



                                                                        [facility]ennis [facility]



                                                                        [place]

 

        2016-03-10 2016-03-10 Outpatient                 nullFlavo [facility]    [id number]

   [facility]



        12:50:31 19:25:00                         r                       dennis Mart

 

        2016 2016-02-10 Outpt Diag                 nullFlavo [facility]    [id number]

01836 [facility]



        12:58:00 05:59:00 Services                 r       Outpatient 00      l



                                                        Imaging         Barron



                                                        [place]         

 

        2016 2016-02-10 Outpt Diag                 nullFlavo [facility]    81593

37965 [facility]



        12:58:00 05:59:00 Services                 r       Outpatient 00      l



                                                        Imaging         [place]



                                                        [place]         

 

        2016 Outpatient         Abbi [place] 29    29    346

[id number] 



        06:58:00 23:59:00                 [place]                  00      

 

        2016 Outpatient                 [facility]    [id number]

365 [facility]



        10:15:00 10:15:00                                         02      dennis Rosarioann

 

        2016 Outpatient                 [facility]    [id number]

365 [facility]



        10:15:00 10:15:00                                         02      dennis



                                                                        Barron

 

        2015 Outpatient                 [facility]    [id number]

365 [facility]



        11:30:00 11:30:00                                         00      dennis



                                                                        Barron

 

        2015 Outpatient                 [facility]    [id number]

365 [facility]



        11:30:00 11:30:00                                         00      dennis Mart

 

        2014 Outpatient                 nullFlavo [facility] 3467

2273_3 Memoria



        01:16:00 05:59:00                         r       Barron [id number] l



                                                        [place] 1       [facility]

 

        2014 Outpatient                 nullFlavo [facility] 3467

2273_3 Memoria



        01:16:00 05:59:00                         r       Barron [id number] 



                                                        [place] 1       [facility]

 

        2014 Outpatient         [facility] 2.16.840. 2.16.840.

1. [id number] 



        19:16:00 23:59:00                 , Vignesh [id number]        

 



                                        Caitlin  [id number] 5.0.100         



                                                00                      

 

        2014 Outpatient                 nullFlavo       10169

14454 [facility]



        19:16:00 19:16:00                         r       [place] 01      l



                                                                        [place]

 

        2014 Outpatient                 nullFlavo       [id number]

84206 [facility]



        19:16:00 19:16:00                         r       [place] 01      l



                                                                        Barron

 

        2014 Outpatient                 nullFlavo       35174

12777 [facility]



        19:43:00 19:43:00                         r       [place] 00      l



                                                                        Barron

 

        2014 Outpatient                 nullFlavo       [id number]

85988 [facility]



        19:43:00 19:43:00                         r       [place] 00      l



                                                                        Barron







Results







           Test Description Test Time  Test Comments Results    Result Comments 

Source









                    ECG 12 lead         2022 22:49:39 









                      Test Item  Value      Reference Range Interpretation Comme

nts









             Ventricular rate (test code = 253)                                 

       

 

             QRSD interval (test code = 260)                                    

    

 

             QT interval (test code = 264)                                      

  

 

             QTC interval (test code = 265)                                     

   

 

             QRS axis 1 (test code = 268)                                       

 

 

             T wave axis (test code = 270)                                      

  

 

             EKG impression (test code = 273) Atrial fibrillation-Rightward axis

-ST & T                           



                          wave abnormality, consider inferior                   

        



                          ischemia-Abnormal ECG-In automated                    

       



                          comparison with ECG of 2022                    

       



                          02:20,-QRS axis shifted right-Electronically          

                 



                          Signed By Neftali Subramanian MD () on                

           



                          2022 4:49:37 PM                           



[facility] (COVID-19) RNA [Presence] in Respiratory specimen 
by EDWARD with probe [id number] 04:29:38





             Test Item    Value        Reference Range Interpretation Comments

 

             SARS-CoV-2 (COVID-19) RNA Not detected                           



             [Presence] in Respiratory                                        



             specimen by EDWARD with probe                                        



             detection (test code = 02424-9)                                    

    

 

             Whether patient is employed in a Unknown                           

     



             healthcare setting (test code =                                    

    



             49867-3)                                            

 

             Whether the patient has symptoms Unknown                           

     



             related to condition of interest                                   

     



             (test code = 35321-5)                                        

 

             Whether the patient was Unknown                                



             hospitalized for condition of                                      

  



             interest (test code = 32686-0)                                     

   

 

             Whether the patient was admitted Unknown                           

     



             to intensive care unit (ICU) for                                   

     



             condition of interest (test code                                   

     



             = 41016-7)                                          

 

             Whether patient resides in a Unknown                               

 



             congregate care setting (test                                      

  



             code = 40068-2)                                        

 

             Pregnancy status (test code = Unknown                              

  



             11690-7)                                            

 

             Date and time of symptom onset Unknown                             

   



             (test code = 76519-0)                                        



[id number]49:00





             Test Item    Value        Reference    Interpretation Comments



                                       Range                     

 

             PTH (test code = 146 pg/mL    16-77        H             Interpreti

ve Guide Intact



             2731-8)                                             PTH



                                                                 Calcium--------

----------



                                                                 ---------- ----

---Normal



                                                                 Parathyroid Nor

mal



                                                                 NormalHypoparat

hyroidism



                                                                 Low or Low Norm

al



                                                                 LowHyperparathy

roidism



                                                                 Primary Normal 

or High



                                                                 High Secondary 

High Normal



                                                                 or Low Tertiary

 High



                                                                 HighNon-Parathy

roid



                                                                 Hypercalcemia L

ow or Low



                                                                 Normal High

 

             SUGEY (test code = FASTING:NO                             



             SUGEY)         FASTING: NO                            

 

             RAC (test code = Performing                             



             RAC)         Organization                           



                          Information:                           



                          Site ID: A                           



                          Name: [facility]                               



                          Address: [address] [place] [address]                            



                          [id number]                             



                          Director:                              



                          Vignesh Felix                           

 

             Lab          Abnormal                               



             Interpretation                                        



             (test code =                                        



             97358-3)                                            



[facility] [id number] 17:49:00





             Test Item    Value        Reference Range Interpretation Comments

 

             TSH (test                 See_Comment                [Automated mes

zia]



             code =                                              The system whic

h



             3016-3)                                             generated this



                                                                 result transmit

kolby



                                                                 reference range

:



                                                                 0.40 - 4.50 mIU

/L.



                                                                 The reference r

chey[place]



                                                                 was not used to



                                                                 interpret this



                                                                 result as



                                                                 normal/abnormal

.

 

             SUGEY (test    FASTING:NO FASTING:                           



             code = SUGEY)  NO                                     

 

             RAC (test    Performing                             



             code = RAC)  Organization                           



                          Information: Site ID:                           



                          [facility]                           



                          Lab Address: [address] [place] [address]                            



                          [id number] Director:                           



                          Vignesh Felix                           



[facility] [id number] 17:49:00





             Test Item    Value        Reference Range Interpretation Comments

 

             Vitamin D,   82 ng/mL                         Vitamin D Statu

s



             25-hydroxy (test                                        25-OH Vitam

in D:



             code = 1989-3)                                        Deficiency: <

20



                                                                 ng/mLInsufficie

ncy



                                                                 : 20 - 29



                                                                 ng/mLOptimal: >

 or



                                                                 = 30 ng/mL For



                                                                 25-OH Vitamin D



                                                                 testing on



                                                                 patients on



                                                                 D2-supplementat

ion



                                                                 and patients fo

r



                                                                 whom quantitati

on



                                                                 of D2 and D3



                                                                 fractions is



                                                                 required, the



                                                                 [employer]ureD(TM

)25



                                                                 -OH VIT D,



                                                                 (D2,D3), LC/MS/

MS



                                                                 is recommended:



                                                                 order code 9288

8



                                                                 (patients



                                                                 >2yrs).See Note

 1



                                                                 Note 1 For



                                                                 additional



                                                                 information,



                                                                 please refer to



                                                                 http://educatio

n.Q



                                                                 [facility]

.co



                                                                 m/faq/VQG025 (T

his



                                                                 link is being



                                                                 provided for



                                                                 informational/e

radu



                                                                 ational purpose

s



                                                                 only.)

 

             SUGEY (test code = FASTING:NO FASTING:                           



             SUGEY)         NO                                     

 

             RAC (test code = Performing                             



             RAC)         Organization                           



                          Information: Site                           



                          ID: RGA [facility]                           



                          Lab Address: [address] [place] [address]                            



                          [phone] Director:                           



                          Vignesh Felix                           



[facility] [id number] 04:51:00





             Test Item    Value        Reference Range Interpretation Comments

 

             POC glucose (test code = 129 mg/dL    65-99        H            Ope

rator Name:



             57434-0)                                            Juliana Rose~Daniela

ce



                                                                 ID:



                                                                 [id number]~Luzmaria

table



                                                                 : HMW Notified 

RN

 

             Lab Interpretation (test Abnormal                               



             code = 10825-4)                                        



[facility], (w Contrast, Strain and
3D if needed)2022 14:28:07





             Test Item    Value        Reference    Interpretation Comments



                                       Range                     

 

             RA pressure (test              mmHg                      



             code = 6598912159)                                        

 

             EF (test code = 50 %         54-74        A            



             6896931474)                                         

 

             IVS,d (test code = 0.92 cm      0.6-0.9      A            



             8137804473)                                         

 

             IVS s 2D (test code 1.08 cm                                



             = 6167025817)                                        

 

             LVPWD,d (test code = 0.93 cm      0.60-1.19                 



             2926464586)                                         

 

             LVPW s PLAX (test 1.13 cm                                



             code = 7142647468)                                        

 

             LV,s (test code = 2.94 cm                                



             3401380856)                                         

 

             LVOT Diam,S (test 1.98 cm                                



             code = 0534974924)                                        

 

             LV PEREZ VOL (test 66.04 ml                         



             code = 6075627578)                                        

 

             LV SYS VOL (test 33.32 ml     14-42                     



             code = 8902786593)                                        

 

             MV Peak E Adama (test 1.61 m/s                               



             code = 1307203462)                                        

 

             MV Peak A Adama (test 0.58 m/s                               



             code = 0406319936)                                        

 

             E/A ratio (test code              See_Comment  A             [Autom

ated



             = 9881013486)                                        message] The



                                                                 system which



                                                                 generated this



                                                                 result



                                                                 transmitted



                                                                 reference range

:



                                                                 <=0.8. The



                                                                 reference range



                                                                 was not used to



                                                                 interpret this



                                                                 result as



                                                                 normal/abnormal

.

 

             E wave decelartion              See_Comment  A             [Automat

ed



             time (test code =                                        message] T

he



             9987297851)                                         system which



                                                                 generated this



                                                                 result



                                                                 transmitted



                                                                 reference range

:



                                                                 200 msec. The



                                                                 reference range



                                                                 was not used to



                                                                 interpret this



                                                                 result as



                                                                 normal/abnormal

.

 

             LV,d (test code = 3.90 cm                                



             6338160432)                                         

 

             IVS/LVPW,2D (test                                        



             code = 7473748609)                                        

 

             LV EF,2D (test code 57.26 %                                



             = 7129036769)                                        

 

             LV FS Cube 2D (test                                        



             code = 9114727818)                                        

 

             LV FS Teich 2D (test                                        



             code = 5289186652)                                        

 

             LV SV Teich 2D (test 32.73 ml                               



             code = 5705488824)                                        

 

             LV Vol s Teich PSAX 33.32 ml                               



             (test code =                                        



             [id number])                                         

 

             LVOT stroke volume 0.46 cm3                               



             (test code =                                        



             2515784901)                                         

 

             Left Atrium  5.30 cm      See_Comment  A             [Automated



             Dimension Anterior                                        message] 

The



             (test code =                                        system which



             7598826552)                                         generated this



                                                                 result



                                                                 transmitted



                                                                 reference range

:



                                                                 <=3.8. The



                                                                 reference range



                                                                 was not used to



                                                                 interpret this



                                                                 result as



                                                                 normal/abnormal

.

 

             LA Vol MOD A4C (test 130.09 ml                              



             code = 9960482956)                                        

 

             LA area s A4C (test 36.52 cm2                              



             code = 3176929558)                                        

 

             LVOT area (test code 3.08 cm2                               



             = 1831081296)                                        

 

             LVOT Vmax (test code 0.85 m/s                               



             = 5587707170)                                        

 

             AoV Mean PG (test              See_Comment                [Automate

d



             code = 6068110852)                                        message] 

The



                                                                 system which



                                                                 generated this



                                                                 result



                                                                 transmitted



                                                                 reference range

:



                                                                 20 mmHg. The



                                                                 reference range



                                                                 was not used to



                                                                 interpret this



                                                                 result as



                                                                 normal/abnormal

.

 

             AoV Peak PG (test              mmHg                      



             code = 4026812829)                                        

 

             AV LVOT peak              mmHg                      



             gradient (test code                                        



             = 0311090261)                                        

 

             AoV Area, Vmax (test 1.94 cm2     See_Comment                [Autom

ated



             code = 7673409556)                                        message] 

The



                                                                 system which



                                                                 generated this



                                                                 result



                                                                 transmitted



                                                                 reference range

:



                                                                 >=1.5. The



                                                                 reference range



                                                                 was not used to



                                                                 interpret this



                                                                 result as



                                                                 normal/abnormal

.

 

             LVOT VTI (CM) (test 15.00 cm                               



             code = 5508681054)                                        

 

             AoV Vmax (test code 1.35 m/s                               



             = 2959577233)                                        

 

             AoV Vmn (test code = 0.82 m/s                               



             3893505522)                                         

 

             AoV Area, VTI (test 2.30 cm2                               



             code = 1801908636)                                        

 

             LVOT CO (test code = 4.72 l/min                             



             6852268039)                                         

 

             LVOT HR for LVOT CO              bpm                       



             (test code =                                        



             3061518751)                                         

 

             Velocity Ratio 0.63 m/s                               



             (V1/V2) (test code =                                        



             4689)                                               

 

             MV mean gradient              See_Comment                [Automated



             (test code =                                        message] The



             6046261876)                                         system which



                                                                 generated this



                                                                 result



                                                                 transmitted



                                                                 reference range

:



                                                                 5 mmHg. The



                                                                 reference range



                                                                 was not used to



                                                                 interpret this



                                                                 result as



                                                                 normal/abnormal

.

 

             MR peak grad (test              mmHg                      



             code = 8938149379)                                        

 

             MV stenosis pressure 30.12 ms     See_Comment                [Autom

ated



             1/2 time (test code                                        message]

 The



             = 7074979138)                                        system which



                                                                 generated this



                                                                 result



                                                                 transmitted



                                                                 reference range

:



                                                                 <=150. The



                                                                 reference range



                                                                 was not used to



                                                                 interpret this



                                                                 result as



                                                                 normal/abnormal

.

 

             MV E A ratio (test                                        



             code = 8975308564)                                        

 

             MV valve area p 1/2 7.30 cm2                               



             method (test code =                                        



             2944409792)                                         

 

             MV VTI Tips (test 0.15 m                                 



             code = 6489210527)                                        

 

             MV Vmax (test code = 0.72 m                                 



             7557690085)                                         

 

             RVSP (test code =              See_Comment  A             [Automate

d



             8155378546)                                         message] The



                                                                 system which



                                                                 generated this



                                                                 result



                                                                 transmitted



                                                                 reference range

:



                                                                 40.00 mmHg. The



                                                                 reference range



                                                                 was not used to



                                                                 interpret this



                                                                 result as



                                                                 normal/abnormal

.

 

             TR pk grad (test              mmHg                      



             code = 4550845116)                                        

 

             TR Vpeak (test code 2.85 m/s                               



             = 4997441904)                                        

 

             RVSP (TR) (test code              mmHg                      



             = 8056498879)                                        

 

             RVOT Vmax (test code 0.44 m/s                               



             = 9622159691)                                        

 

             PV Mean Grad (test              mmHg                      



             code = 4550204652)                                        

 

             PV Pk Grad (test              See_Comment                [Automated



             code = 2410643871)                                        message] 

The



                                                                 system which



                                                                 generated this



                                                                 result



                                                                 transmitted



                                                                 reference range

:



                                                                 36 mmHg. The



                                                                 reference range



                                                                 was not used to



                                                                 interpret this



                                                                 result as



                                                                 normal/abnormal

.

 

             PV VTI (test code = 0.17 m                                 



             8555310815)                                         

 

             RVOT pk grad (test              mmHg                      



             code = 7104054392)                                        

 

             PV VMAX (test code = 1.05 m/s     See_Comment                [Autom

ated



             3652756573)                                         message] The



                                                                 system which



                                                                 generated this



                                                                 result



                                                                 transmitted



                                                                 reference range

:



                                                                 <=3. The



                                                                 reference range



                                                                 was not used to



                                                                 interpret this



                                                                 result as



                                                                 normal/abnormal

.

 

             PV Vmn (test code =                                        



             5939608065)                                         

 

             Ao Root Diameter 2.95 cm      See_Comment                [Automated



             (test code =                                        message] The



             0291206421)                                         system which



                                                                 generated this



                                                                 result



                                                                 transmitted



                                                                 reference range

:



                                                                 <=3.99. The



                                                                 reference range



                                                                 was not used to



                                                                 interpret this



                                                                 result as



                                                                 normal/abnormal

.

 

             Ao Root Diameter 2.95 cm                                



             (test code =                                        



             5394677839)                                         

 

             Ascending aorta 2.58 cm                                



             (test code =                                        



             6730858491)                                         

 

             Pred METS R1 (test                                        



             code = 6734813354)                                        

 

             Pred Exer Dur R1                                        



             (test code =                                        



             9642800618)                                         

 

             MV Decel slope (test 15.54 m/s2                             



             code = 0077560286)                                        

 

             LVPW pct thck PLAX 20.67 %                                



             (test code =                                        



             6795057859)                                         

 

             LV vol s cube 2D 25.42 ml                               



             (test code =                                        



             4588594438)                                         

 

             LV vol d cube 2D 59.47 ml                               



             (test code =                                        



             3649372011)                                         

 

             LV SV Cube 2D (test 34.05 ml                               



             code = 3551256254)                                        

 

             IVS pct thck PLAX 17.04 %                                



             (test code =                                        



             2851628048)                                         

 

             Calc MPHR (test code              bpm                       



             = 8692105916)                                        

 

             85 of MPHR (test                                        



             code = 9495214602)                                        

 

             RVOT VTI (test code 0.08 m                                 



             = 9005422604)                                        

 

             RVOT Vmn (test code 0.30 m/s                               



             = 0452564043)                                        

 

             RVOT mean grad (test              mmHg                      



             code = 1697604871)                                        

 

             MAX Pred HR (test                                        



             code = 8946567400)                                        

 

             LVOT mean grad (test              mmHg                      



             code = 1890265041)                                        

 

             Aov area Vmn (test 2.31 cm2                               



             code = 2765031175)                                        

 

             MV AE ratio (test                                        



             code = 2671242782)                                        

 

             LVOT Vmn (test code                                        



             = 4985503188)                                        

 

             MR Vmax (test code = 4.65 m/s                               



             0261293734)                                         

 

             AoV VTI (test code = 0.20 m                                 



             8006633027)                                         

 

             LVOT VTI (test code 0.15 m                                 



             = 9575737252)                                        

 

                          SUGEY (test code =          

                                                           



             SUGEY)          Left Ventricle:                           



                          Normal systolic                           



                          function with a                           



                          visually estimated                           



                          EF of 55 - 60%.                           



                          Grade III                              



                          (restrictive)                           



                          diastolic                              



                                                    dysfunction.

                                                           



                           Right Ventricle:                           



                          Right ventricle is                           



                          moderately dilated.                           



                          Mildly reduced                           



                                                    systolic function.

                                                           



                           Tricuspid Valve:                           



                          Moderate valvular                           



                          regurgitation. Left                           



                          VentricleLeft                           



                          ventricle size is                           



                          normal. Normal                           



                          systolic function                           



                          with a visually                           



                          estimated EF of 55 -                           



                          60%. Grade III                           



                          (restrictive)                           



                          diastolic                              



                          dysfunction.Right                           



                          VentricleRight                           



                          ventricle is                           



                          moderately dilated.                           



                          Mildly reduced                           



                          systolic                               



                          function.Left                           



                          AtriumLeft atrium is                           



                          severely                               



                          dilated.Right                           



                          AtriumRight atrium                           



                          is severely                            



                          dilated.Mitral                           



                          ValveMild mitral                           



                          annular                                



                          calcification. Mild                           



                          valvular                               



                          regurgitation.Tricus                           



                          pid ValveValve                           



                          structure is normal.                           



                          Moderate valvular                           



                          regurgitation.Aortic                           



                          ValveValve structure                           



                          is normal. No                           



                          significant valvular                           



                          regurgitation.Pulmon                           



                          ic ValveValve                           



                          structure is normal.                           



                          Mild valvular                           



                          regurgitation.Perica                           



                          rdiumThere is no                           



                          pericardial effusion                           



                          present.Study                           



                          DetailsStudy quality                           



                          was adequate. A                           



                          complete 2D, color                           



                          flow Doppler and                           



                          spectral Doppler                           



                          echocardiogram was                           



                          performed.The                           



                          apical, parasternal,                           



                          subcostal and                           



                          suprasternal views                           



                          were obtained.                           



                          Technical                              



                          difficulties due to                           



                          lung artifact.                           

 

             Lab Interpretation Abnormal                               



             (test code =                                        



             19537-3)                                            



Janeen ChandARS-CoV-2 (COVID-19) RNA [Presence] in Respiratory specimen 
by EDWARD with probe [id number] 19:34:22





             Test Item    Value        Reference Range Interpretation Comments

 

             SARS-CoV-2 (COVID-19) RNA [Presence] Detected                      

         



             in Respiratory specimen by EDWARD with                                

        



             probe detection (test code =                                       

 



             57828-2)                                            

 

             Whether patient is employed in a Unknown                           

     



             healthcare setting (test code =                                    

    



             85967-8)                                            

 

             Whether the patient has symptoms Unknown                           

     



             related to condition of interest                                   

     



             (test code = 55681-7)                                        

 

             Whether the patient was hospitalized Unknown                       

         



             for condition of interest (test code                               

         



             = 25651-8)                                          

 

             Whether the patient was admitted to Unknown                        

        



             intensive care unit (ICU) for                                      

  



             condition of interest (test code =                                 

       



             27749-0)                                            

 

             Whether patient resides in a Unknown                               

 



             congregate care setting (test code =                               

         



             73548-0)                                            

 

             Pregnancy status (test code = Unknown                              

  



             26363-7)                                            

 

             Date and time of symptom onset (test Unknown                       

         



             code = 26369-1)                                        



[facility] METABOLIC [id number] 16:39:00





             Test Item    Value        Reference Range Interpretation Comments

 

             SODIUM (test code = NA) 138 mEq/L    134-147      N            

 

             POTASSIUM (test code = 3.7 mEq/L    3.4-5.0      N            



             K)                                                  

 

             CHLORIDE (test code = 100 mEq/L    100-108      N            



             CL)                                                 

 

             CARBON DIOXIDE (test 28 mEq/l     21-33        N            



             code = CO2)                                         

 

             ANION GAP (test code = 13           0-20         N            



             GAP)                                                

 

             GLUCOSE (test code = 77 mg/dL            N            



             GLU)                                                

 

             BLOOD UREA NITROGEN 13 mg/dL     7-18         N            



             (test code = BUN)                                        

 

             GLOMERULAR FILTRATION 11.6         80-90        L            Units 

of measure =



             RATE (test code = GFR)                                        ml/mi

n/1.73 m2

 

             CREATININE (test code = 3.9 mg/dL    0.6-1.3      H            



             CREAT)                                              

 

             CALCIUM (test code = 8.8 mg/dL    8.0-10.5     N            



             CA)                                                 



PROTHROMBIN [id number] 16:33:00





             Test Item    Value        Reference Range Interpretation Comments

 

             PROTHROMBIN TIME 13.1 SECONDS 9.3-12.9     H            



             PATIENT (test code =                                        



             PTP)                                                

 

             INTERNATIONAL NORMAL 1.2          0.8-1.2      N             TARGET

 INR BY



             RATIO (test code =                                        INDICATIO

N Indication



             INR)                                                INR1. Prophylax

is of



                                                                 venous thrombos

is  2.0



                                                                 - 3.0 (orthoped

ic



                                                                 surgery), Proph

ylaxis



                                                                 of venous throm

bosis



                                                                 (other than hig

h-risk



                                                                 surgery), Treat

ment of



                                                                 Deep Vein



                                                                 Thrombosis/Pulm

onary



                                                                 Embolism, Preve

ntion



                                                                 of systemic emb

olism -



                                                                 Tissue heart va

lves,



                                                                 Acute Myocardia

l



                                                                 Infarction (to 

prevent



                                                                 systemic emboli

sm),



                                                                 Valvular heart



                                                                 disease, Atrial



                                                                 Fibrillation,



                                                                 Bileaflet mecha

nical



                                                                 valve in aortic



                                                                 position.2. Mec

hanical



                                                                 prosthetic valv

es



                                                                 (high risk), 2.

5 - 3.5



                                                                 Presence of Lup

us



                                                                 Anticoagulant o

r



                                                                 Antiphospholipi

d



                                                                 Antibodies, Pre

vention



                                                                 of systemic emb

olism -



                                                                 Acute Myocardia

l



                                                                 Infarction (to 

prevent



                                                                 recurrent infar

ct).



CBC W/AUTO [id number] 16:23:00





             Test Item    Value        Reference Range Interpretation Comments

 

             WHITE BLOOD CELL (test code = 9.2 x10 3/uL 4.5-11.0     N          

  



             WBC)                                                

 

             RED BLOOD CELL (test code = 3.17 x10 6/uL 3.54-5.02    L           

 



             RBC)                                                

 

             HEMOGLOBIN (test code = HGB) 10.3 g/dL    11.0-15.0    L           

 

 

             HEMATOCRIT (test code = HCT) 33.2 %       33.0-45.0    N           

 

 

             MEAN CELL VOLUME (test code = 104.7 fL     81.0-99.0    H          

  



             MCV)                                                

 

             MEAN CELL HGB (test code = MCH) 32.5 pg      27.0-33.0    N        

    

 

             MEAN CELL HGB CONCETRATION 31.0 g/dL    33.0-37.0    L            



             (test code = MCHC)                                        

 

             RED CELL DISTRIBUTION WIDTH CV 14.5 %       11.5-14.5    N         

   



             (test code = RDW)                                        

 

             RED CELL DISTRIBUTION WIDTH SD 56.6 fL      37.0-54.0    H         

   



             (test code = RDW-SD)                                        

 

             PLATELET COUNT (test code = 108 x10 3/uL 150-400      L            



             PLT)                                                

 

             MEAN PLATELET VOLUME (test code 10.2 fL      7.0-9.0      H        

    



             = MPV)                                              

 

             NEUTROPHIL % (test code = NT%) 80.2 %       56.0-77.0    H         

   

 

             IMMATURE GRANULOCYTE % (test 1.4 %        0.0-2.0      N           

 



             code = IG%)                                         

 

             LYMPHOCYTE % (test code = LY%) 11.0 %       14.0-32.0    L         

   

 

             MONOCYTE % (test code = MO%) 5.2 %        4.8-9.0      N           

 

 

             EOSINOPHIL % (test code = EO%) 1.5 %        0.3-3.7      N         

   

 

             BASOPHIL % (test code = BA%) 0.7 %        0.0-2.0      N           

 

 

             NUCLEATED RBC % (test code = 0.0 %        0-0          N           

 



             NRBC%)                                              

 

             NEUTROPHIL # (test code = NT#) 7.34 x10 3/uL 2.0-7.6      N        

    

 

             IMMATURE GRANULOCYTE # (test 0.13 x10 3/uL 0.00-0.03    H          

  



             code = IG#)                                         

 

             LYMPHOCYTE # (test code = LY#) 1.01 x10 3/uL 1.0-3.8      N        

    

 

             MONOCYTE # (test code = MO#) 0.48 x10 3/uL 0.1-0.8      N          

  

 

             EOSINOPHIL # (test code = EO#) 0.14 x10 3/uL 0.0-0.2      N        

    

 

             BASOPHIL # (test code = BA#) 0.06 x10 3/uL 0.0-0.2      N          

  

 

             NUCLEATED RBC # (test code = 0.00 x10 3/uL 0.0-0.1      N          

  



             NRBC#)                                              

 

             MANUAL DIFF REQUIRED (test code NO                                 

    



             = MDIFF)                                            



Hepatitis B surface [id number] 21:36:32





             Test Item    Value        Reference Range Interpretation Comments

 

             Hep B S Ab (test code <8.0         See_Comment                [Auto

mated



             = 27840-2)                                          message] The



                                                                 system which



                                                                 generated this



                                                                 result transmit

kolby



                                                                 reference range

:



                                                                 <8.0 mIU/mL. Th

e



                                                                 reference range



                                                                 was not used to



                                                                 interpret this



                                                                 result as



                                                                 normal/abnormal

.

 

             SUGEY (test code = SUGEY)  ID -                           



                          DB                                     

 

             Lab Interpretation Normal                                 



             (test code = 14771-7)                                        



[id number]36:32





             Test Item    Value        Reference Range Interpretation Comments

 

             Hep B S Ab (test code <8.0         See_Comment                [Auto

mated



             = 84322-6)                                          message] The



                                                                 system which



                                                                 generated this



                                                                 result transmit

kolby



                                                                 reference range

:



                                                                 <8.0 mIU/mL. Th

e



                                                                 reference range



                                                                 was not used to



                                                                 interpret this



                                                                 result as



                                                                 normal/abnormal

.

 

             SUGEY (test code = SUGEY)  ID -                           



                          DB                                     

 

             Lab Interpretation Normal                                 



             (test code = 39018-1)                                        



[facility] [id number] 21:36:32





             Test Item    Value        Reference Range Interpretation Comments

 

             HEPATITIS B SURFACE ANTIBODY < mIU/mL     <8.0                     

 



             (BEAKER) (test code = 647)                                        



 ID - DBHepatitis B surface [id number] 21:24:22





             Test Item    Value        Reference Range Interpretation Comments

 

             Hepatitis B surface Nonreactive  Nonreactive               



             antigen (test code =                                        



             5195-3)                                             

 

             SUGEY (test code = SUGEY) Specimen is                            



                          considered negative                           



                          for HBsAg.                             

 

             Lab Interpretation (test Normal                                 



             code = 53061-5)                                        



[facility] [id number] 21:24:22





             Test Item    Value        Reference Range Interpretation Comments

 

             Hepatitis B surface Nonreactive  Nonreactive               



             antigen (test code =                                        



             5195-3)                                             

 

             SUGEY (test code = SUGEY) Specimen is                            



                          considered negative                           



                          for HBsAg.                             

 

             Lab Interpretation (test Normal                                 



             code = 83438-8)                                        



[facility] [id number] 21:24:22





             Test Item    Value        Reference Range Interpretation Comments

 

             HEPATITIS B SURFACE ANTIGEN (2) Nonreactive  Nonreactive           

    



             (BEAKER) (test code = 2585)                                        



Specimen is considered negative for HBsAg.Transthoracic Echocardiogram Complete,
(w Contrast, Strain and 3D if needed)2022 13:23:07





             Test Item    Value        Reference    Interpretation Comments



                                       Range                     

 

             EF (test code = 66 %         54-74                     



             [id number])                                         

 

             IVS,d (test code = 1.03 cm      0.6-0.9      A            



             8895855358)                                         

 

             LVPWD,d (test code = 1.27 cm      0.60-1.19    A            



             6523114267)                                         

 

             LV,s (test code = 2.56 cm                                



             3085760370)                                         

 

             LVOT Diam,S (test 2.01 cm                                



             code = 7397880730)                                        

 

             LV PEREZ VOL (test 69.20 ml                         



             code = 8214432552)                                        

 

             LV SYS VOL (test 23.74 ml     14-42                     



             code = 4055506387)                                        

 

             MV Peak E Adama (test 1.20 m/s                               



             code = 5092511648)                                        

 

             MV Peak A Adama (test 0.47 m/s                               



             code = 6007602891)                                        

 

             E/A ratio (test code              See_Comment  A             [Autom

ated



             = 8809089057)                                        message] The



                                                                 system which



                                                                 generated this



                                                                 result



                                                                 transmitted



                                                                 reference range

:



                                                                 <=0.8. The



                                                                 reference range



                                                                 was not used to



                                                                 interpret this



                                                                 result as



                                                                 normal/abnormal

.

 

             E wave decelartion              See_Comment  A             [Automat

ed



             time (test code =                                        message] T

he



             [id number])                                         system which



                                                                 generated this



                                                                 result



                                                                 transmitted



                                                                 reference range

:



                                                                 200 msec. The



                                                                 reference range



                                                                 was not used to



                                                                 interpret this



                                                                 result as



                                                                 normal/abnormal

.

 

             LV,d (test code = 3.98 cm                                



             8399752205)                                         

 

             IVS/LVPW,2D (test                                        



             code = 1546022702)                                        

 

             LV EF,2D (test code 73.32 %                                



             = 5252238173)                                        

 

             LV FS Cube 2D (test                                        



             code = 4999747722)                                        

 

             LV FS Teich 2D (test                                        



             code = 9518733821)                                        

 

             LV SV Teich 2D (test 45.46 ml                               



             code = 6670472256)                                        

 

             LV Vol s Teich PSAX 23.74 ml                               



             (test code =                                        



             2188767551)                                         

 

             LVOT stroke volume 0.35 cm3                               



             (test code =                                        



             6548051785)                                         

 

             Left Atrium  4.52 cm      See_Comment  A             [Automated



             Dimension Anterior                                        message] 

The



             (test code =                                        system which



             0829394165)                                         generated this



                                                                 result



                                                                 transmitted



                                                                 reference range

:



                                                                 <=3.8. The



                                                                 reference range



                                                                 was not used to



                                                                 interpret this



                                                                 result as



                                                                 normal/abnormal

.

 

             LA Vol MOD A4C (test 83.62 ml                               



             code = 6176044712)                                        

 

             LA area s A4C (test 28.59 cm2                              



             code = 1809451062)                                        

 

             LA Ao Ratio Mmode                                        



             (test code =                                        



             9457364982)                                         

 

             LVOT area (test code 3.17 cm2                               



             = 3050675272)                                        

 

             LVOT Vmax (test code 0.62 m/s                               



             = 3340224932)                                        

 

             AoV Mean PG (test              See_Comment                [Automate

d



             code = 8033030844)                                        message] 

The



                                                                 system which



                                                                 generated this



                                                                 result



                                                                 transmitted



                                                                 reference range

:



                                                                 20 mmHg. The



                                                                 reference range



                                                                 was not used to



                                                                 interpret this



                                                                 result as



                                                                 normal/abnormal

.

 

             AoV Peak PG (test              mmHg                      



             code = 5305923922)                                        

 

             AV LVOT peak              mmHg                      



             gradient (test code                                        



             = 7368556966)                                        

 

             AoV Area, Vmax (test 2.00 cm2     See_Comment                [Autom

ated



             code = 9700343436)                                        message] 

The



                                                                 system which



                                                                 generated this



                                                                 result



                                                                 transmitted



                                                                 reference range

:



                                                                 >=1.5. The



                                                                 reference range



                                                                 was not used to



                                                                 interpret this



                                                                 result as



                                                                 normal/abnormal

.

 

             LVOT VTI (CM) (test 11.00 cm                               



             code = 5023998799)                                        

 

             AoV Vmax (test code 0.98 m/s                               



             = 6651591219)                                        

 

             AoV Vmn (test code = 0.65 m/s                               



             5558464136)                                         

 

             AoV Area, VTI (test 2.42 cm2                               



             code = 9095490028)                                        

 

             Velocity Ratio 0.63 m/s                               



             (V1/V2) (test code =                                        



             4689)                                               

 

             MR peak grad (test              mmHg                      



             code = 7957807008)                                        

 

             MV stenosis pressure 31.00 ms     See_Comment                [Autom

ated



             1/2 time (test code                                        message]

 The



             = 0143113042)                                        system which



                                                                 generated this



                                                                 result



                                                                 transmitted



                                                                 reference range

:



                                                                 <=150. The



                                                                 reference range



                                                                 was not used to



                                                                 interpret this



                                                                 result as



                                                                 normal/abnormal

.

 

             MV E A ratio (test                                        



             code = 4890615936)                                        

 

             MV valve area p 1/2 7.10 cm2                               



             method (test code =                                        



             3162109417)                                         

 

             E prime lat (test                                        



             code = 0112193114)                                        

 

             E prine sept (test                                        



             code = 2447648313)                                        

 

             RVSP (test code =              See_Comment  A             [Automate

d



             7601274008)                                         message] The



                                                                 system which



                                                                 generated this



                                                                 result



                                                                 transmitted



                                                                 reference range

:



                                                                 40.00 mmHg. The



                                                                 reference range



                                                                 was not used to



                                                                 interpret this



                                                                 result as



                                                                 normal/abnormal

.

 

             RA pressure (test              mmHg                      



             code = 8947344026)                                        

 

             TR pk grad (test              mmHg                      



             code = 3895794731)                                        

 

             TR Vpeak (test code 3.51 m/s                               



             = 6223772972)                                        

 

             RVSP (TR) (test code              mmHg                      



             = 8663798946)                                        

 

             RVOT Vmax (test code 0.46 m/s                               



             = 5745710904)                                        

 

             PV Pk Grad (test              See_Comment                [Automated



             code = 8893582750)                                        message] 

The



                                                                 system which



                                                                 generated this



                                                                 result



                                                                 transmitted



                                                                 reference range

:



                                                                 36 mmHg. The



                                                                 reference range



                                                                 was not used to



                                                                 interpret this



                                                                 result as



                                                                 normal/abnormal

.

 

             RVOT pk grad (test              mmHg                      



             code = 7271721079)                                        

 

             PV VMAX (test code = 1.15 m/s     See_Comment                [Autom

ated



             3425082535)                                         message] The



                                                                 system which



                                                                 generated this



                                                                 result



                                                                 transmitted



                                                                 reference range

:



                                                                 <=3. The



                                                                 reference range



                                                                 was not used to



                                                                 interpret this



                                                                 result as



                                                                 normal/abnormal

.

 

             Ao root annulus 2.82 cm                                



             (test code =                                        



             4208412333)                                         

 

             Ao Root Diameter 3.23 cm      See_Comment                [Automated



             (test code =                                        message] The



             5911677418)                                         system which



                                                                 generated this



                                                                 result



                                                                 transmitted



                                                                 reference range

:



                                                                 <=3.99. The



                                                                 reference range



                                                                 was not used to



                                                                 interpret this



                                                                 result as



                                                                 normal/abnormal

.

 

             Ao Root Diameter 3.23 cm                                



             (test code =                                        



             3270296093)                                         

 

             Ascending aorta 3.69 cm                                



             (test code =                                        



             0016804892)                                         

 

             Pred METS R1 (test                                        



             code = 0787138846)                                        

 

             Pred Exer Dur R1                                        



             (test code =                                        



             6507845028)                                         

 

             MV Decel slope (test 11.26 m/s2                             



             code = 4332237237)                                        

 

             LV vol s cube 2D 16.83 ml                               



             (test code =                                        



             4549685476)                                         

 

             LV vol d cube 2D 63.08 ml                               



             (test code =                                        



             1587213569)                                         

 

             LV SV Cube 2D (test 46.25 ml                               



             code = 7331844296)                                        

 

             Calc MPHR (test code              bpm                       



             = 9004349446)                                        

 

             85 of MPHR (test                                        



             code = 1560239438)                                        

 

             MAX Pred HR (test                                        



             code = 2524918369)                                        

 

             LVOT mean grad (test              mmHg                      



             code = 0099409447)                                        

 

             Aov area Vmn (test 1.92 cm2                               



             code = 4460531758)                                        

 

             MV AE ratio (test                                        



             code = 7952097488)                                        

 

             LVOT Vmn (test code                                        



             = 5888035822)                                        

 

             MR Vmax (test code = 4.22 m/s                               



             8186740070)                                         

 

             AoV VTI (test code = 0.15 m                                 



             6938506346)                                         

 

             LVOT VTI (test code 0.11 m                                 



             = 3961348375)                                        

 

                          SUGEY (test code =          

                                                           



             SUGEY)          Left Ventricle:                           



                          Normal systolic                           



                          function with a                           



                          visually estimated                           



                          EF of 65 - 70%.                           



                          Grade I (impaired                           



                          relaxation)                            



                          diastolic                              



                                                    dysfunction.

                                                           



                           Left Atrium: Left                           



                          atrium is mildly                           



                                                    dilated.

                                                           



                           Right Ventricle:                           



                          Right ventricle is                           



                          moderately dilated.                           



                          Reduced systolic                           



                                                    function.

                                                           



                           Right Atrium: Right                           



                          atrium is moderately                           



                                                    dilated.

                                                           



                           Mitral Valve: Valve                           



                          structure is normal.                           



                          Mildly calcified                           



                          leaflets. Mild                           



                          valvular                               



                                                    regurgitation.

                                                           



                           Tricuspid Valve:                           



                          Moderate valvular                           



                          regurgitation.                           



                          Moderately elevated                           



                          pulmonary artery                           



                          systolic pressure.                           



                          RVSP is 59.82 mmHg.                           



                          Left VentricleLeft                           



                          ventricle size is                           



                          normal. Normal                           



                          systolic function                           



                          with a visually                           



                          estimated EF of 65 -                           



                          70%. Grade I                           



                          (impaired                              



                          relaxation)                            



                          diastolic                              



                          dysfunction.Right                           



                          VentricleRight                           



                          ventricle is                           



                          moderately dilated.                           



                          Reduced systolic                           



                          function.Left                           



                          AtriumLeft atrium is                           



                          mildly dilated.Right                           



                          AtriumRight atrium                           



                          is moderately                           



                          dilated.Mitral                           



                          ValveValve structure                           



                          is normal. Mildly                           



                          calcified leaflets.                           



                          Mild valvular                           



                          regurgitation.Tricus                           



                          pid ValveValve                           



                          structure is normal.                           



                          Moderate valvular                           



                          regurgitation.                           



                          Moderately elevated                           



                          pulmonary artery                           



                          systolic pressure.                           



                          RVSP is 59.82                           



                          mmHg.Aortic                            



                          ValveValve structure                           



                          appears tricuspid.                           



                          No significant                           



                          valvular                               



                          regurgitation. No                           



                          stenosis.Pulmonic                           



                          ValveValve structure                           



                          is                                     



                          normal.PericardiumTh                           



                          ere is no                              



                          pericardial effusion                           



                          present.Study                           



                          DetailsStudy quality                           



                          was good. A complete                           



                          2D, color flow                           



                          Doppler and spectral                           



                          Doppler                                



                          echocardiogram was                           



                          performed.The                           



                          apical, parasternal,                           



                          subcostal and                           



                          suprasternal views                           



                          were obtained.                           



                          Patient exhibited                           



                          sinus tachycardia.                           

 

             Lab Interpretation Abnormal                               



             (test code =                                        



             22287-8)                                            



[facility] [id number] 07:26:00





             Test Item    Value        Reference    Interpretation Comments



                                       Range                     

 

             Urine culture Proteus [id number]              A            Specime

n



             isolate (test cfu/mlThe                              InformationSpe

[facility]



             code = 70507-6) performance                            Source: Urin

eSpecimen



                          characteristics of                           Site: [facility]

an catch



                          this assay on this                           



                          isolatewere                            



                          validated by the                           



                          Microbiology                           



                          Laboratory at                           



                          [facility]. This                           



                          source has not been                           



                          approved by the                           



                          U.S. Food and Drug                           



                          Administration. The                           



                          results are not                           



                          intended to be used                           



                          as the sole means                           



                          for clinical                           



                          diagnosis or                           



                          patient management.                           



                          The Microbiology                           



                          Laboratory is                           



                          authorized under                           



                          the clinical                           



                          Laboratory                             



                          Improvement                            



                          Amendments of 1988                           



                          (CLIA-88) to                           



                          perform high                           



                          complexity testing.                           

 

             Lab          Abnormal                               



             Interpretation                                        



             (test code =                                        



             28546-6)                                            



[facility]Presence] in Respiratory specimen 
by EDWARD with probe [id number] 00:41:02





             Test Item    Value        Reference Range Interpretation Comments

 

             SARS-CoV-2 (COVID-19) RNA Not detected                           



             [Presence] in Respiratory                                        



             specimen by EDWARD with probe                                        



             detection (test code = 04373-5)                                    

    

 

             Whether patient is employed in a Unknown                           

     



             healthcare setting (test code =                                    

    



             11852-4)                                            

 

             Whether the patient has symptoms Unknown                           

     



             related to condition of interest                                   

     



             (test code = 76246-3)                                        

 

             Whether the patient was Unknown                                



             hospitalized for condition of                                      

  



             interest (test code = 88395-2)                                     

   

 

             Whether the patient was admitted Unknown                           

     



             to intensive care unit (ICU) for                                   

     



             condition of interest (test code                                   

     



             = 17388-3)                                          

 

             Whether patient resides in a Unknown                               

 



             congregate care setting (test                                      

  



             code = 27293-1)                                        

 

             Pregnancy status (test code = Unknown                              

  



             50280-6)                                            

 

             Date and time of symptom onset Unknown                             

   



             (test code = 25296-5)                                        



[facility]-  [id number] 10:28:00
************************************************************[facility] : 1955 Sex: 
F************************************************************ Name: 
SUZI SEARS [facility]  : 1955 Age/S: 66 / F [id number] [place] 
Unit #: [id number] Loc: [place] [address] Phys: Suzie Dominguez MD  Acct: 
[id number] Dis Date: Status: REG SDC PHONE #: [phone] Exam Date: 
2022 0950 FAX #: Reason: ERCP EXAMS: CPT: [id number] XR ERCP  73480 Fluoro 
Time: 33 SEC DAP (Gy m2): Air Kerma (mGy): EXAMINATION: - XR ERCP. LOCATION: 
S17. HISTORY: ERCP, CBD obstruction. COMPARISON: None. FINDINGS/ IMPRESSION: 
Nine portable limited intraoperative fluoroscopic images of right upper quadrant
 during ERCP are submitted to radiology department. Initial image demonstrates 
CBD stent and postoperative clips in upper abdomen. Subsequent images 
demonstrate contrast opacification of CBD and small bowel. Please see operative 
report for further details. No radiologist was present during procedure. 
FLUOROSCOPIC TIME: 35.6 seconds. Reference air Kerma: 11.146 mGy. ** 
Electronically Signed by ISH Paz ** ** on 2022 at 1028 **
 Reported and signed by: Roverto Paz M.D. CC: Suzie Dominguez MD; 
Charisse Green MD  PAGE 1 Signed Report Name:SUZI SEARS [place] 
: 1955 Age/S: 66 / F [id number] [place] Unit #: [id number] Loc: 
[place] [address] Phys: Suzie Dominguez MD  Acct: [id number] Dis Date: Status: 
REG SDC PHONE #: [phone] Exam Date: 2022 0950 FAX #: Reason: ERCP 
EXAMS: CPT: [id number] XR ERCP [id number]  FluoroTime: 33 SEC DAP (Gy m2): Air Kerma 
(mGy): (Continued) Technologist: Maribel Diaz RT(R) Trnscb Date/Time: 
2022 (9224) [email or web address] Orig Print D/T: S: 2022 (3214) PAGE 2 Signed 
[id number] 08:51:00





             Test Item    Value        Reference Range Interpretation Comments

 

             POTASSIUM (test code = K) 5.1 mmol/L   3.4-5.0      H            



CBC W/AUTO [id number] 08:15:00





             Test Item    Value        Reference Range Interpretation Comments

 

             WHITE BLOOD CELL (test code = 9.2 K/mm3    3.5-11.0     N          

  



             WBC)                                                

 

             RED BLOOD CELL (test code = 3.76 M/mm3   4.70-6.10    L            



             RBC)                                                

 

             HEMOGLOBIN (test code = HGB) 11.8 G/DL    10.4-14.9    N           

 

 

             HEMATOCRIT (test code = HCT) 37.7 %       31.5-44.1    N           

 

 

             MEAN CELL VOLUME (test code = 100.3 Fl     84.5-98.6    H          

  



             MCV)                                                

 

             MEAN CELL HGB (test code = MCH) 31.4 pg      27.0-34.2    N        

    

 

             MEAN CELL HGB CONCETRATION 31.3 G/DL    31.5-34.0    L            



             (test code = MCHC)                                        

 

             RED CELL DISTRIBUTION WIDTH 13.2 SD      11.5-14.5    N            



             (test code = RDW)                                        

 

             PLATELET COUNT (test code = 136 K/mm3    150-450      L            



             PLT)                                                

 

             MEAN PLATELET VOLUME (test code 9.80 fL      7.0-10.5     N        

    



             = MPV)                                              

 

             NEUTROPHIL % (test code = NT%) 71.9 %       40-76        N         

   

 

             IMMATURE GRANULOCYTE % (test 0.9 %        0.0-5.0      N           

 



             code = IG%)                                         

 

             LYMPHOCYTE % (test code = LY%) 18.1 %       20.5-51.1    L         

   

 

             MONOCYTE % (test code = MO%) 6.8 %        1.7-9.3      N           

 

 

             EOSINOPHIL % (test code = EO%) 1.5 %        0.0-6.0      N         

   

 

             BASOPHIL % (test code = BA%) 0.8 %        0.0-2.0      N           

 

 

             NUCLEATED RBC % (test code = 0.0 /100WBC% 0.0-1.0      N           

 



             NRBC%)                                              

 

             NEUTROPHIL # (test code = NT#) 6.7 K/mm3    1.8-7.6      N         

   

 

             IMMATURE GRANULOCYTE # (test 0.08 x10 3/uL 0.00-0.03    H          

  



             code = IG#)                                         

 

             LYMPHOCYTE # (test code = LY#) 1.7 K/mm3    0.6-3.2      N         

   

 

             MONOCYTE # (test code = MO#) 0.6 K/mm3    0.3-1.1      N           

 

 

             EOSINOPHIL # (test code = EO#) 0.1 K/mm3    0.0-0.4      N         

   

 

             BASOPHIL # (test code = BA#) 0.1 K/mm3    0.0-0.1      N           

 

 

             NUCLEATED RBC # (test code = 0.0 K/mm3    0.0-0.1      N           

 



             NRBC#)                                              

 

             MANUAL DIFF REQUIRED (test code NO DIFF/SCN  CRITERIA              

    



             = MDIFF)                                            



BASIC METABOLIC [id number] 08:08:00





             Test Item    Value        Reference Range Interpretation Comments

 

             SODIUM (test code = NA) 135 mmol/L   134-147      N            

 

             POTASSIUM (test code = K) 5.9 mmol/L   3.4-5.0      HH           

 

             CHLORIDE (test code = CL) 103 mmol/L   100-108      N            

 

             CARBON DIOXIDE (test code = CO2) 27 mmol/L    21-32        N       

     

 

             ANION GAP (test code = GAP) 5.0 GAP calc 4.0-15.0     N            

 

             GLUCOSE (test code = GLU) 109 MG/DL           N            

 

             BLOOD UREA NITROGEN (test code = 43 MG/DL     7-18         H       

     



             BUN)                                                

 

             GLOMERULAR FILTRATION RATE (test 4 estGFR     >60          L       

     



             code = GFR)                                         

 

             CREATININE (test code = CREAT) 10.5 MG/DL   0.6-1.0      H         

   

 

             CALCIUM (test code = CA) 9.9 MG/DL    8.5-10.1     N            



COVID 19 INHOUSE [id number] 14:11:00





             Test Item    Value        Reference Range Interpretation Comments

 

             COVID 19 INHOUSE AG NEGATIVE     Negative                  Per arthur

facturer,



             (test code =                                        negative result

s should



             [id number])                                        be treated aspr

esumptive



                                                                 and, if inconsi

stent with



                                                                 clinical signs



                                                                 andsymptoms or 

necessary



                                                                 for patient man

agement,



                                                                 should betested

 with an



                                                                 alternative mol

ecular



                                                                 assay. Negative

 resultsdo



                                                                 not preclude SA

RS-CoV-2



                                                                 infection and s

hould not



                                                                 be usedas the s

ole basis



                                                                 for patient man

agement



                                                                 decisions. Nega

tive



                                                                 results should 

be



                                                                 considered in t

he context



                                                                 of apatient's r

ecent



                                                                 exposures, hist

ory,



                                                                 presence of cli

nicalsigns



                                                                 and symptoms co

nsistent



                                                                 with COVID-19.



CBC W/AUTO [id number] 14:00:00





             Test Item    Value        Reference Range Interpretation Comments

 

             WHITE BLOOD CELL 7.2 K/mm3    3.5-11.0     N            



             (test code = WBC)                                        

 

             RED BLOOD CELL (test 4.01 M/mm3   4.70-6.10    L            



             code = RBC)                                         

 

             HEMOGLOBIN (test code 12.6 G/DL    10.4-14.9    N            



             = HGB)                                              

 

             HEMATOCRIT (test code 39.7 %       31.5-44.1    N            



             = HCT)                                              

 

             MEAN CELL VOLUME 99.0 Fl      84.5-98.6    H            



             (test code = MCV)                                        

 

             MEAN CELL HGB (test 31.4 pg      27.0-34.2    N            



             code = MCH)                                         

 

             MEAN CELL HGB 31.7 G/DL    31.5-34.0    N            



             CONCETRATION (test                                        



             code = MCHC)                                        

 

             RED CELL DISTRIBUTION 13.3 SD      11.5-14.5    N            



             WIDTH (test code =                                        



             RDW)                                                

 

             PLATELET COUNT (test 106 K/mm3    150-450      L            



             code = PLT)                                         

 

             MEAN PLATELET VOLUME 10.20 fL     7.0-10.5     N            



             (test code = MPV)                                        

 

             NEUTROPHIL % (test 72.0 %       40-76        N            



             code = NT%)                                         

 

             IMMATURE GRANULOCYTE 0.7 %        0.0-5.0      N            



             % (test code = IG%)                                        

 

             LYMPHOCYTE % (test 17.8 %       20.5-51.1    L            



             code = LY%)                                         

 

             MONOCYTE % (test code 7.4 %        1.7-9.3      N            



             = MO%)                                              

 

             EOSINOPHIL % (test 1.4 %        0.0-6.0      N            



             code = EO%)                                         

 

             BASOPHIL % (test code 0.7 %        0.0-2.0      N            



             = BA%)                                              

 

             NUCLEATED RBC % (test 0.0 /100WBC% 0.0-1.0      N            



             code = NRBC%)                                        

 

             NEUTROPHIL # (test 5.2 K/mm3    1.8-7.6      N            



             code = NT#)                                         

 

             IMMATURE GRANULOCYTE 0.05 x10 3/uL 0.00-0.03    H            



             # (test code = IG#)                                        

 

             LYMPHOCYTE # (test 1.3 K/mm3    0.6-3.2      N            



             code = LY#)                                         

 

             MONOCYTE # (test code 0.5 K/mm3    0.3-1.1      N            



             = MO#)                                              

 

             EOSINOPHIL # (test 0.1 K/mm3    0.0-0.4      N            



             code = EO#)                                         

 

             BASOPHIL # (test code 0.1 K/mm3    0.0-0.1      N            



             = BA#)                                              

 

             NUCLEATED RBC # (test 0.0 K/mm3    0.0-0.1      N            



             code = NRBC#)                                        

 

             MANUAL DIFF REQUIRED NO DIFF/SCN  CRITERIA                  SLIDE R

EVIEW



             (test code = MDIFF)                                        CONSISTA

NT WITH



                                                                 AUTO DIFFERENTI

AL.



BASIC METABOLIC [id number] 13:32:00





             Test Item    Value        Reference Range Interpretation Comments

 

             SODIUM (test code = NA) 136 mmol/L   134-147      N            

 

             POTASSIUM (test code = K) 5.1 mmol/L   3.4-5.0      H            

 

             CHLORIDE (test code = CL) 103 mmol/L   100-108      N            

 

             CARBON DIOXIDE (test code = CO2) 26 mmol/L    21-32        N       

     

 

             ANION GAP (test code = GAP) 7.0 GAP calc 4.0-15.0     N            

 

             GLUCOSE (test code = GLU) 103 MG/DL           N            

 

             BLOOD UREA NITROGEN (test code = 37 MG/DL     7-18         H       

     



             BUN)                                                

 

             GLOMERULAR FILTRATION RATE (test 6 estGFR     >60          L       

     



             code = GFR)                                         

 

             CREATININE (test code = CREAT) 7.4 MG/DL    0.6-1.0      H         

   

 

             CALCIUM (test code = CA) 10.1 MG/DL   8.5-10.1     N            



HEPATIC FUNCTION [id number] 13:32:00





             Test Item    Value        Reference Range Interpretation Comments

 

             TOTAL PROTEIN (test code = PROT) 7.7 G/DL     6.4-8.2      N       

     

 

             ALBUMIN (test code = ALB) 3.4 G/DL     3.4-5.0      N            

 

             BILIRUBIN TOTAL (test code = BILT) 2.10 MG/DL   0.2-1.2      H     

       

 

             BILIRUBIN DIRECT (test code = 0.50 MG/DL   0.00-0.30    H          

  



             BILD)                                               

 

             BILIRUBIN INDIRECT (test code = 1.60 MG/DL   0.2-1.2      H        

    



             BILIND)                                             

 

             SGOT/AST (test code = AST) 17 Unit/L    15-37        N            

 

             SGPT/ALT (test code = ALT) 27 Unit/L    12-78        N            

 

             ALKALINE PHOSPHATASE TOTAL (test 222 Unit/L          H       

     



             code = ALKP)                                        



[id number] 13:32:00





             Test Item    Value        Reference Range Interpretation Comments

 

             LIPASE (test code = LIP) 109 Unit/L   114-286      L            



[id number] 18:01:00





             Test Item    Value        Reference Range Interpretation Comments

 

             SURGICAL (test --------------------------------                    

       



             code = SR)   --------------------------------                      

     



                          ----------------------------RUN                       

    



                          DATE: 22 [facility] PAGE 1 RUN TIME:                       

    



                          1801 Specimen Inquiry RUN USER:                       

    



                          INTERFACE                              



                          --------------------------------                      

     



                          --------------------------------                      

     



                          ----------------------------KYLE                      

     



                          ENT: SUZI SEARS ACCT #:                          

 



                          [id number] LOC: L.END U #:                          

 



                          [id number] AGE/SX: 66/F ROOM:                         

  



                          RE22REG DR:                           



                          Clark Cuellar MD :                           



                          57 BED: DIS: STATUS: LEEROY                        

   



                          [facility] TLOC:                              



                          --------------------------------                      

     



                          --------------------------------                      

     



                          ----------------------------                          

 



                          SPEC #: 22:PMC: RECD:                           



                           STATUS: VERONICA LEYVA                        

   



                          #: [id number] SOFIA: 22-5                       

    



                          [facility] DR: Clark Cuellar MD                          

 



                          ENTERED:  SP TYPE:                       

    



                          SURGICAL OTHR DR:                           



                          Charisse Green MD ORDERED:                         

  



                          45660/2, 87367, 92818, 01596,                         

  



                          ANATOMIC SPEC, SPECIMEN TRACK                         

  



                          COPIES TO: Charisse Green MD                       

    



                          [address] [facility] [place] [address] [phone]                           



                          Clark Cuellar MD [address] [place] [address]                         

  



                          [phone] PROCEDURES: [id number]                        

   



                          (22-) 95680                           



                          () 51936                           



                          () 28311                           



                          () SPECIMEN TRACK                        

   



                          () TISSUES: A.                           



                          GASTRIC MUCOSA - BX ANTRUM AND                        

   



                          BODY B. ESOPHAGUS BIOPSY -                           



                          DISTAI ESOPHAGUS BX FINAL                           



                          DIAGNOSIS A. Stomach, antrum and                      

     



                          body, endoscopic biopsy:-                           



                          Healing/reparative/chemical                           



                          gastropathy changes, minimal to                       

    



                          mild- Negative for intestinal                         

  



                          metaplasia- Negative for                           



                          Helicobacter pylori                           



                          (Immunohistochemistry stain) B.                       

    



                          Esophagus, distal, endoscopic                         

  



                          biopsy:- Reflux esophagitis,                          

 



                          mild- Negative for glandular                          

 



                          epithelium/intestinal                           



                          metaplasia/dysplasia (Alcian                          

 



                          Blue stain)- Negative for fungal                      

     



                          organisms (PAS stain) Comment:                        

   



                          Suggest clinical correlation.                         

  



                          Note: For each marker stain                           



                          tested above: Positive control                        

   



                          is positive and                           



                          Negative(external or internal)                        

   



                          control is negative (staining                         

  



                          performed at [facility]).                       

    



                          ** CONTINUED ON NEXT PAGE **                          

 



                          --------------------------------                      

     



                          --------------------------------                      

     



                          ----------------------------RUN                       

    



                          DATE: 22 [facility] PAGE 2 RUN TIME:                       

    



                          1801  Specimen Inquiry RUN USER:                      

     



                          INTERFACE                              



                          --------------------------------                      

     



                          --------------------------------                      

     



                          ----------------------------SPEC                      

     



                          #: 22:[facility]: PATIENT:                           



                          SUZI SEARS #[id number]                         

  



                          (Continued)---------------------                      

     



                          --------------------------------                      

     



                          --------------------------------                      

     



                          ------- GROSS DESCRIPTION A.                          

 



                          Antrum and body biopsy. It                           



                          consists of 4 tissue fragments                        

   



                          measuring 2-5 mm. All as A1. B.                       

    



                          Distal esophageal biopsy. It                          

 



                          consists of 2 tissue fragments                        

   



                          measuring 3 mm each. Allas B1.                        

   



                          Technical component performed at                      

     



                          [facility],[id number] ,                        

   



                          [place] [address] Unless gross                         

  



                          only, the diagnosis is based                          

 



                          upon microscopic                           



                          examination.Immunohistochemistry                      

     



                          : This test was developed and                         

  



                          its performancecharacteristics                        

   



                          determined by this laboratory.                        

   



                          It has not been approved nordoes                      

     



                          it need approval by the [place] FDA.                       

    



                          Appropriate positive and                           



                          negative controlsare reviewed                         

  



                          and judged to be acceptable.                          

 



                          This laboratory is certified                          

 



                          underthe Clinical Laboratory                          

 



                          Improvement Amendments (CLIA-88)                      

     



                          as qualified toperform high                           



                          complexity clinical laboratory                        

   



                          testing. MICROSCOPIC DESCRIPTION                      

     



                          Findings are incorporated into                        

   



                          the diagnosis                           



                          section.------------------------                      

     



                          --------------------------------                      

     



                          --------------------------------                      

     



                          ---- Signed SIGNATURE ON FILE                         

  



                          Kye Fontenot 22 1801                         

  



                          --------------------------------                      

     



                          --------------------------------                      

     



                          ----------------------------  **                      

     



                          END OF REPORT **                           



BASIC METABOLIC [id number] 08:13:00





             Test Item    Value        Reference Range Interpretation Comments

 

             SODIUM (test code = NA) 137 mmol/L   134-147      N            

 

             POTASSIUM (test code = K) 4.2 mmol/L   3.4-5.0      N            

 

             CHLORIDE (test code = CL) 105 mmol/L   100-108      N            

 

             CARBON DIOXIDE (test code = CO2) 23 mmol/L    21-32        N       

     

 

             ANION GAP (test code = GAP) 9.0 GAP calc 4.0-15.0     N            

 

             GLUCOSE (test code = GLU) 111 MG/DL           H            

 

             BLOOD UREA NITROGEN (test code = 41 MG/DL     7-18         H       

     



             BUN)                                                

 

             GLOMERULAR FILTRATION RATE (test 5 estGFR     >60          L       

     



             code = GFR)                                         

 

             CREATININE (test code = CREAT) 8.6 MG/DL    0.6-1.0      H         

   

 

             CALCIUM (test code = CA) 9.8 MG/DL    8.5-10.1     N            



- XR CHEST 2 -[id number] 13:28:00
********************************[facility]****[facility] : 1955 Sex: 
F************************************************************ Name: 
SUZI SEARS  [facility] : 1955 Age/S: 66 / F [id number] [place] 
Unit #: [id number] Loc: [place] [address]  Phys: Clark Cuellar MD Acct: 
[id number] Dis Date: Status: PRE [facility] PHONE#: [phone] Exam Date: 
2022 1259 FAX #: Reason: PRE OP EXAMS: CPT:  [id number] XR CHEST 2 [id number] 
Fluoro Time: DAP (Gy m2): Air Kerma (mGy): Chest 2 views 2022 1:27 PM 
CLINICAL HISTORY: Preop COMPARISON: None available LOCATION: W1 IMPRESSION: 
There is elevation of the right hemidiaphragm. Bibasilar opacities suggest 
atelectasis. Pneumonia should be excluded clinically. Cardiomediastinalcontours 
are within normal limits. The central vasculature is not engorged.  A tunneled 
left hemodialysis catheter is present. There are chronic appearing degenerative 
changes in the skeleton. ** Electronically Signed by M.D. Timothy Seipel ** ** 
on 2022 at 1328 ** Reported and signed by: Timothy Seipel, M.D. CC: 
Charisse Green MD; Clark Cuellar MD PAGE 1 Signed Report  Name: 
SUZI SEARS [facility] : 1955 Age/S: 66 / F [id number] [place] 
Unit #: [id number] Loc:  [place] [address] Phys: Clark Cuellar MD Acct: 
[id number] Dis Date: Status: PRE SDC  PHONE #: [phone] Exam Date: 
2022 1253 FAX #: Reason: PRE OP EXAMS:  CPT: [id number] XR CHEST 2 V 54255 
Fluoro Time: DAP (Gy m2): Air Kerma (mGy): (Continued) Technologist: Kylah Jacobo RT (R)(CT) Trnscb Date/Time: 2022 (1328) [id number] Orig Print D/T: 
S: 2022 (6255)  PAGE 2 Signed ReportBASIC METABOLIC [id number] 
12:55:00





             Test Item    Value        Reference Range Interpretation Comments

 

             SODIUM (test code = NA) 134 mmol/L   134-147      N            

 

             POTASSIUM (test code = K) 4.6 mmol/L   3.4-5.0      N            

 

             CHLORIDE (test code = CL) 100 mmol/L   100-108      N            

 

             CARBON DIOXIDE (test code = CO2) 28 mmol/L    21-32        N       

     

 

             ANION GAP (test code = GAP) 6.0 GAP calc 4.0-15.0     N            

 

             GLUCOSE (test code = GLU) 113 MG/DL           H            

 

             BLOOD UREA NITROGEN (test code = 38 MG/DL     7-18         H       

     



             BUN)                                                

 

             GLOMERULAR FILTRATION RATE (test 6 estGFR     >60          L       

     



             code = GFR)                                         

 

             CREATININE (test code = CREAT) 7.0 MG/DL    0.6-1.0      H         

   

 

             CALCIUM (test code = CA) 10.5 MG/DL   8.5-10.1     H            



COVID 19 INHOUSE [id number] 12:52:00





             Test Item    Value        Reference Range Interpretation Comments

 

             COVID 19 INHOUSE AG NEGATIVE     Negative                  Per manu

facturer,



             (test code =                                        negative result

s should



             [id number])                                        be treated aspr

esumptive



                                                                 and, if inconsi

stent with



                                                                 clinical signs



                                                                 andsymptoms or 

necessary



                                                                 for patient man

agement,



                                                                 should betested

 with an



                                                                 alternative mol

ecular



                                                                 assay. Negative

 resultsdo



                                                                 not preclude SA

RS-CoV-2



                                                                 infection and s

hould not



                                                                 be usedas the s

ole basis



                                                                 for patient man

agement



                                                                 decisions. Nega

tive



                                                                 results should 

be



                                                                 considered in t

he context



                                                                 of apatient's r

ecent



                                                                 exposures, hist

ory,



                                                                 presence of cli

nicalsigns



                                                                 and symptoms co

nsistent



                                                                 with COVID-19.



PROTHROMBIN [id number] 12:44:00





             Test Item    Value        Reference Range Interpretation Comments

 

             PT PATIENT (test 12.2 SECONDS 9.3-12.9     N            



             code = PTP)                                         

 

             INTERNATIONAL NORMAL 1.07 INR Unit 0.8-1.2      N             TARGE

T INR BY



             RATIO (test code =                                        INDICATIO

N Indication



             INR)                                                INR1. Prophylax

is of



                                                                 venous thrombos

is 2.0



                                                                 - 3.0 (orthoped

ic



                                                                 surgery), Proph

ylaxis



                                                                 of venous throm

bosis



                                                                 (other than hig

h-risk



                                                                 surgery), Treat

ment of



                                                                 Deep Vein



                                                                 Thrombosis/Pulm

onary



                                                                 Embolism, Preve

ntion



                                                                 of systemic emb

olism -



                                                                 Tissue heart va

lves,



                                                                 Acute Myocardia

l



                                                                 Infarction (to 

prevent



                                                                 systemic emboli

sm),



                                                                 Valvular heart



                                                                 disease, Acute



                                                                 Myocardial Infa

rction



                                                                 (to prevent sys

temic



                                                                 embolism), Valv

ular



                                                                 heart disease, 

Atrial



                                                                 Fibrillation,



                                                                 Bileaflet mecha

nical



                                                                 valve in aortic



                                                                 position.2. Mec

hanical



                                                                 prosthetic valv

es



                                                                 (high risk), 2.

5 - 3.5



                                                                 Presence of Lup

us



                                                                 Anticoagulant o

r



                                                                 Antiphospholipi

d



                                                                 Antibodies, Pre

vention



                                                                 of systemic emb

olism -



                                                                 Acute Myocardia

l



                                                                 Infarction (to 

prevent



                                                                 recurrent infar

ct).



THROMBOPLASTIN TIME [id number] 12:44:00





             Test Item    Value        Reference Range Interpretation Comments

 

             THROMBOPLASTIN TIME PARTIAL 36.2 SECONDS 26-35        H            



             (test code = PTT)                                        



CBC W/AUTO [id number] 12:43:00





             Test Item    Value        Reference Range Interpretation Comments

 

             WHITE BLOOD CELL (test code = 9.1 K/mm3    3.5-11.0     N          

  



             WBC)                                                

 

             RED BLOOD CELL (test code = 4.16 M/mm3   4.70-6.10    L            



             RBC)                                                

 

             HEMOGLOBIN (test code = HGB) 13.1 G/DL    10.4-14.9    N           

 

 

             HEMATOCRIT (test code = HCT) 41.8 %       31.5-44.1    N           

 

 

             MEAN CELL VOLUME (test code = 100.5 Fl     84.5-98.6    H          

  



             MCV)                                                

 

             MEAN CELL HGB (test code = MCH) 31.5 pg      27.0-34.2    N        

    

 

             MEAN CELL HGB CONCETRATION 31.3 G/DL    31.5-34.0    L            



             (test code = MCHC)                                        

 

             RED CELL DISTRIBUTION WIDTH 13.8 SD      11.5-14.5    N            



             (test code = RDW)                                        

 

             PLATELET COUNT (test code = 140 K/mm3    150-450      L            



             PLT)                                                

 

             MEAN PLATELET VOLUME (test code 10.20 fL     7.0-10.5     N        

    



             = MPV)                                              

 

             NEUTROPHIL % (test code = NT%) 71.8 %       40-76        N         

   

 

             IMMATURE GRANULOCYTE % (test 1.3 %        0.0-5.0      N           

 



             code = IG%)                                         

 

             LYMPHOCYTE % (test code = LY%) 18.2 %       20.5-51.1    L         

   

 

             MONOCYTE % (test code = MO%) 6.7 %        1.7-9.3      N           

 

 

             EOSINOPHIL % (test code = EO%) 1.3 %        0.0-6.0      N         

   

 

             BASOPHIL % (test code = BA%) 0.7 %        0.0-2.0      N           

 

 

             NUCLEATED RBC % (test code = 0.0 /100WBC% 0.0-1.0      N           

 



             NRBC%)                                              

 

             NEUTROPHIL # (test code = NT#) 6.5 K/mm3    1.8-7.6      N         

   

 

             IMMATURE GRANULOCYTE # (test 0.12 x10 3/uL 0.00-0.03    H          

  



             code = IG#)                                         

 

             LYMPHOCYTE # (test code = LY#) 1.7 K/mm3    0.6-3.2      N         

   

 

             MONOCYTE # (test code = MO#) 0.6 K/mm3    0.3-1.1      N           

 

 

             EOSINOPHIL # (test code = EO#) 0.1 K/mm3    0.0-0.4      N         

   

 

             BASOPHIL # (test code = BA#) 0.1 K/mm3    0.0-0.1      N           

 

 

             NUCLEATED RBC # (test code = 0.0 K/mm3    0.0-0.1      N           

 



             NRBC#)                                              

 

             MANUAL DIFF REQUIRED (test code NO DIFF/SCN  CRITERIA              

    



             = MDIFF)                                            



SARS-CoV-2 (COVID-19) RNA [Presence] in Respiratory specimen by EDWARD with probe 
[id number] 23:10:10





             Test Item    Value        Reference Range Interpretation Comments

 

             SARS coronavirus RNA [Presence] Not detected                       

    



             in Isolate by EDWARD with probe                                       

 



             detection (test code = 61997-7)                                    

    

 

             Whether patient is employed in a No                                

     



             healthcare setting (test code =                                    

    



             64094-7)                                            

 

             Whether the patient has symptoms Yes                               

     



             related to condition of interest                                   

     



             (test code = 82740-6)                                        

 

             Whether the patient was No                                     



             hospitalized for condition of                                      

  



             interest (test code = 57203-6)                                     

   

 

             Whether the patient was admitted No                                

     



             to intensive care unit (ICU) for                                   

     



             condition of interest (test code                                   

     



             = 00945-8)                                          

 

             Whether patient resides in a No                                    

 



             congregate care setting (test                                      

  



             code = 93786-9)                                        

 

             Pregnancy status (test code = No                                   

  



             29132-6)                                            

 

             Date and time of symptom onset Unknown                             

   



             (test code = 41334-9)                                        



[facility]-CoV-2 (COVID-19) RNA [Presence] in 
Respiratory specimen by EDWARD with probe [id number] 23:10:10





             Test Item    Value        Reference Range Interpretation Comments

 

             SARS-CoV-2 (COVID-19) RNA Not detected                           



             [Presence] in Respiratory                                        



             specimen by EDWARD with probe                                        



             detection (test code = 40824-8)                                    

    

 

             Whether patient is employed in a No                                

     



             healthcare setting (test code =                                    

    



             49593-9)                                            

 

             Whether the patient has symptoms Yes                               

     



             related to condition of interest                                   

     



             (test code = 92615-3)                                        

 

             Whether the patient was No                                     



             hospitalized for condition of                                      

  



             interest (test code = 39950-9)                                     

   

 

             Whether the patient was admitted No                                

     



             to intensive care unit (ICU) for                                   

     



             condition of interest (test code                                   

     



             = 93646-1)                                          

 

             Whether patient resides in a No                                    

 



             congregate care setting (test                                      

  



             code = 63077-3)                                        

 

             Pregnancy status (test code = No                                   

  



             98127-9)                                            

 

             Date and time of symptom onset Unknown                             

   



             (test code = 28502-6)                                        



[facility]-CoV-2 (COVID-19) RNA [Presence] in 
Respiratory specimen by EDWARD with probe [id number] 22:34:30





             Test Item    Value        Reference Range Interpretation Comments

 

             SARS-CoV-2 (COVID-19) RNA Not detected Not-Detected              



             [Presence] in Respiratory                                        



             specimen by EDWARD with probe                                        



             detection (test code = 30347-8)                                    

    

 

             Whether patient is employed in a                                   

     



             healthcare setting (test code =                                    

    



             05046-7)                                            

 

             Whether the patient has symptoms                                   

     



             related to condition of interest                                   

     



             (test code = 35872-0)                                        

 

             Patient was hospitalized because                                   

     



             of this condition (test code =                                     

   



             70154-8)                                            

 

             Whether the patient was admitted                                   

     



             to intensive care unit (ICU) for                                   

     



             condition of interest (test code                                   

     



             = 38862-0)                                          

 

             Whether patient resides in a                                       

 



             congregate care setting (test                                      

  



             code = 93500-1)                                        



[facility]-CoV-2 (COVID-19) RNA [Presence] in 
Respiratory specimen by EDWARD with probe [id number] 22:35:42





             Test Item    Value        Reference Range Interpretation Comments

 

             SARS-CoV-2 (COVID-19) RNA Not detected Not-Detected              



             [Presence] in Respiratory                                        



             specimen by EDWARD with probe                                        



             detection (test code = 85385-3)                                    

    

 

             Whether patient is employed in a                                   

     



             healthcare setting (test code =                                    

    



             53188-3)                                            

 

             Whether the patient has symptoms                                   

     



             related to condition of interest                                   

     



             (test code = 66108-4)                                        

 

             Patient was hospitalized because                                   

     



             of this condition (test code =                                     

   



             91465-4)                                            

 

             Whether the patient was admitted                                   

     



             to intensive care unit (ICU) for                                   

     



             condition of interest (test code                                   

     



             = 22766-3)                                          

 

             Whether patient resides in a                                       

 



             congregate care setting (test                                      

  



             code = 54909-6)                                        



[facility]-CoV-2 (COVID-19) RNA [Presence] in 
Respiratory specimen by EDWARD with probe [id number] 22:46:10





             Test Item    Value        Reference Range Interpretation Comments

 

             SARS-CoV-2 (COVID-19) RNA Not detected Not-Detected              



             [Presence] in Respiratory                                        



             specimen by EDWARD with probe                                        



             detection (test code = 08258-0)                                    

    

 

             Whether patient is employed in a                                   

     



             healthcare setting (test code =                                    

    



             07702-9)                                            

 

             Whether the patient has symptoms                                   

     



             related to condition of interest                                   

     



             (test code = 86766-1)                                        

 

             Patient was hospitalized because                                   

     



             of this condition (test code =                                     

   



             85837-3)                                            

 

             Whether the patient was admitted                                   

     



             to intensive care unit (ICU) for                                   

     



             condition of interest (test code                                   

     



             = 11202-2)                                          

 

             Whether patient resides in a                                       

 



             congregate care setting (test                                      

  



             code = 20047-1)                                        



[facility]-CoV-2 (COVID-19) RNA [Presence] in 
Respiratory specimen by EDWARD with probe [id number] 01:54:24





             Test Item    Value        Reference Range Interpretation Comments

 

             SARS-CoV-2 (COVID-19) RNA Not detected Not-Detected              



             [Presence] in Respiratory                                        



             specimen by EDWARD with probe                                        



             detection (test code = 06577-2)                                    

    

 

             Whether patient is employed in a                                   

     



             healthcare setting (test code =                                    

    



             70714-1)                                            

 

             Whether the patient has symptoms                                   

     



             related to condition of interest                                   

     



             (test code = 38567-6)                                        

 

             Patient was hospitalized because                                   

     



             of this condition (test code =                                     

   



             98592-6)                                            

 

             Whether the patient was admitted                                   

     



             to intensive care unit (ICU) for                                   

     



             condition of interest (test code                                   

     



             = 05129-4)                                          

 

             Whether patient resides in a                                       

 



             congregate care setting (test                                      

  



             code = 04023-9)                                        



[facility] (C[facility]-19) RNA [Presence] in 
Respiratory specimen by EDWARD with probe [id number] 21:44:06





             Test Item    Value        Reference Range Interpretation Comments

 

             SARS-CoV-2 (COVID-19) RNA Not detected Not-Detected              



             [Presence] in Respiratory                                        



             specimen by EDWARD with probe                                        



             detection (test code = 99571-9)                                    

    

 

             Whether patient is employed in a                                   

     



             healthcare setting (test code =                                    

    



             11981-0)                                            

 

             Whether the patient has symptoms                                   

     



             related to condition of interest                                   

     



             (test code = 42276-7)                                        

 

             Patient was hospitalized because                                   

     



             of this condition (test code =                                     

   



             80758-1)                                            

 

             Whether the patient was admitted                                   

     



             to intensive care unit (ICU) for                                   

     



             condition of interest (test code                                   

     



             = 68522-5)                                          

 

             Whether patient resides in a                                       

 



             congregate care setting (test                                      

  



             code = 43262-4)                                        



[facility] (C[facility]-19) RNA [Presence] in 
Respiratory specimen by EDWARD with probe [id number] 00:36:59





             Test Item    Value        Reference Range Interpretation Comments

 

             SARS-CoV-2 (COVID-19) RNA Not detected Not-Detected              



             [Presence] in Respiratory                                        



             specimen by EDWARD with probe                                        



             detection (test code = 98259-8)                                    

    



[facility] ([facility]) RNA [Presence] in 
Respiratory specimen by EDWARD with probe [id number] 17:35:35





             Test Item    Value        Reference Range Interpretation Comments

 

             SARS-CoV-2 (COVID-19) RNA Not detected Not-Detected              



             [Presence] in Respiratory                                        



             specimen by EDWARD with probe                                        



             detection (test code = 89617-0)                                    

    



[facility] (C[facility]) RNA [Presence] in 
Respiratory specimen by EDWARD with probe [id number] 18:03:30





             Test Item    Value        Reference Range Interpretation Comments

 

             SARS-CoV-2 (COVID-19) RNA Not detected Not-Detected              



             [Presence] in Respiratory                                        



             specimen by EDWARD with probe                                        



             detection (test code = 01956-2)                                    

    



[facility] ([facility]) RNA [Presence] in 
Respiratory specimen by EDWARD with probe [id number] 10:06:03





             Test Item    Value        Reference Range Interpretation Comments

 

             SARS-CoV-2 (COVID-19) RNA Not detected Not-Detected              



             [Presence] in Respiratory                                        



             specimen by EDWARD with probe                                        



             detection (test code = 83191-4)                                    

    



[facility] ([facility]) RNA [Presence] in 
Respiratory specimen by EDWARD with probe [id number] 05:11:58





             Test Item    Value        Reference Range Interpretation Comments

 

             SARS-CoV-2 (COVID-19) RNA Not detected Not-Detected              



             [Presence] in Respiratory                                        



             specimen by EDWARD with probe                                        



             detection (test code = 54920-1)                                    

    



[facility] (C[facility]-19) RNA [Presence] in 
Respiratory specimen by EDWARD with probe [id number] 03:34:16





             Test Item    Value        Reference Range Interpretation Comments

 

             SARS-CoV-2 (COVID-19) RNA Not detected Not-Detected              



             [Presence] in Respiratory                                        



             specimen by EDWARD with probe                                        



             detection (test code = 88809-1)                                    

    



[facility] (C[facility]) RNA [Presence] in 
Respiratory specimen by EDWARD with probe iajqaexco2028-93-07 10:06:41





             Test Item    Value        Reference Range Interpretation Comments

 

             SARS-CoV-2 (COVID-19) RNA Not detected Not-Detected              



             [Presence] in Respiratory                                        



             specimen by EDWARD with probe                                        



             detection (test code = 45658-1)                                    

    



[facility]2020-10-14 12:33:00





             Test Item    Value        Reference Range Interpretation Comments

 

             Chol (test code = Chol) 129                                    



[facility]2020-10-14 12:33:00





             Test Item    Value        Reference Range Interpretation Comments

 

             HDL (test code = HDL) 37                                     



[facility]2020-10-14 12:33:00





             Test Item    Value        Reference Range Interpretation Comments

 

             Trig (test code = Trig) 76                                     



[facility]2020-10-14 12:33:00





             Test Item    Value        Reference Range Interpretation Comments

 

             LDL (Calculated) (test code = LDL 76                               

      



             (Calculated))                                        



[facility]2020-10-14 12:33:00





             Test Item    Value        Reference Range Interpretation Comments

 

             CHD Risk (test code = CHD Risk) 3.5                                

    



[facility]2020-10-14 12:33:00





             Test Item    Value        Reference Range Interpretation Comments

 

             Non HDL Chol (test code = Non HDL Chol) 92                         

            



[facility]2020-10-14 12:33:00





             Test Item    Value        Reference Range Interpretation Comments

 

             Chol (test code = Chol) 129                                    



[facility]2020-10-14 12:33:00





             Test Item    Value        Reference Range Interpretation Comments

 

             HDL (test code = HDL) 37                                     



[facility]2020-10-14 12:33:00





             Test Item    Value        Reference Range Interpretation Comments

 

             Trig (test code = Trig) 76                                     



[facility]2020-10-14 12:33:00





             Test Item    Value        Reference Range Interpretation Comments

 

             LDL (Calculated) (test code = LDL 76                               

      



             (Calculated))                                        



[facility]2020-10-14 12:33:00





             Test Item    Value        Reference Range Interpretation Comments

 

             CHD Risk (test code = CHD Risk) 3.5                                

    



[facility]2020-10-14 12:33:00





             Test Item    Value        Reference Range Interpretation Comments

 

             Non HDL Chol (test code = Non HDL Chol) 92                         

            



[facility]2020-10-14 12:33:00





             Test Item    Value        Reference Range Interpretation Comments

 

             Chol (test code = Chol) 129                                    



[facility]-10-14 12:33:00





             Test Item    Value        Reference Range Interpretation Comments

 

             HDL (test code = HDL) 37                                     



[facility]2020-10-14 12:33:00





             Test Item    Value        Reference Range Interpretation Comments

 

             Trig (test code = Trig) 76                                     



[facility]2020-10-14 12:33:00





             Test Item    Value        Reference Range Interpretation Comments

 

             LDL (Calculated) (test code = LDL 76                               

      



             (Calculated))                                        



[place] [address]-10-14 12:33:00





             Test Item    Value        Reference Range Interpretation Comments

 

             CHD Risk (test code = CHD Risk) 3.5                                

    



[facility]





             Test Item    Value        Reference Range Interpretation Comments

 

             Non HDL Chol (test code = Non HDL Chol) 92                         

            



[facility]





             Test Item    Value        Reference Range Interpretation Comments

 

             Chol (test code = Chol) 129                                    



[facility]





             Test Item    Value        Reference Range Interpretation Comments

 

             HDL (test code = HDL) 37                                     



[facility] [address] 12:33:00





             Test Item    Value        Reference Range Interpretation Comments

 

             Trig (test code = Trig) 76                                     



[facility]-10-14 12:33:00





             Test Item    Value        Reference Range Interpretation Comments

 

             LDL (Calculated) (test code = LDL 76                               

      



             (Calculated))                                        



[facility]-10-14 12:33:00





             Test Item    Value        Reference Range Interpretation Comments

 

             CHD Risk (test code = CHD Risk) 3.5                                

    



[facility]





             Test Item    Value        Reference Range Interpretation Comments

 

             Non HDL Chol (test code = Non HDL Chol) 92                         

            



[facility] [id number] 14:47:00





             Test Item    Value        Reference Range Interpretation Comments

 

             Vitamin D, 25-OH, Total (test code = 37                      

         



             Vitamin D, 25-OH, Total)                                        



[facility] [id number] 14:47:00





             Test Item    Value        Reference Range Interpretation Comments

 

             U Creat mg/dL (test code = U Creat 114                       

       



             mg/dL)                                              



[facility] [id number] 14:47:00





             Test Item    Value        Reference Range Interpretation Comments

 

             U Prot/Creat (test code = U Prot/Creat) 430                  

            



[facility] [id number] 14:47:00





             Test Item    Value        Reference Range Interpretation Comments

 

             U Prot/Creat (test code = U 0.430 1      0.021-0.161               



             Prot/Creat)                                         



[facility] [id number] 14:47:00





             Test Item    Value        Reference Range Interpretation Comments

 

             U Protein (test code = U Protein) 49           5-24                

      



[facility] [id number] 14:47:00





             Test Item    Value        Reference Range Interpretation Comments

 

             Glucose Lvl (test code = Glucose Lvl) 103          65-99           

          



[place]190-08-06 14:47:00





             Test Item    Value        Reference Range Interpretation Comments

 

             BUN (test code = BUN) 24           7-25                      



[place] [address]-08-06 14:47:00





             Test Item    Value        Reference Range Interpretation Comments

 

             Creatinine Lvl (test code = Creatinine 1.32         0.50-0.99      

           



             Lvl)                                                



[place] [address]-08-06 14:47:00





             Test Item    Value        Reference Range Interpretation Comments

 

             eGFR NON-AFR. AMERICAN (test code = 43                             

        



             eGFR NON-AFR. AMERICAN)                                        



[place] [address]-08-06 14:47:00





             Test Item    Value        Reference Range Interpretation Comments

 

             eGFR  (test code = eGFR 50                         

            



             )                                        



[place] [address]-08-06 14:47:00





             Test Item    Value        Reference Range Interpretation Comments

 

             B/C Ratio (test code = B/C Ratio) 18           6-22                

      



[place] [address]-08-06 14:47:00





             Test Item    Value        Reference Range Interpretation Comments

 

             Sodium Lvl (test code = Sodium Lvl) 138          135-146           

        



[place] 113190-08-06 14:47:00





             Test Item    Value        Reference Range Interpretation Comments

 

             Potassium Lvl (test code = Potassium 4.4          3.5-5.3          

         



             Lvl)                                                



[facility]2020-08-06 14:47:00





             Test Item    Value        Reference Range Interpretation Comments

 

             Chloride Lvl (test code = Chloride Lvl) 102                  

            



[place] 113190-08-06 14:47:00





             Test Item    Value        Reference Range Interpretation Comments

 

             CO2 (test code = CO2) 30           20-32                     



[place] [address]-08-06 14:47:00





             Test Item    Value        Reference Range Interpretation Comments

 

             Calcium Lvl (test code = Calcium Lvl) 9.4          8.6-10.4        

          



[place] 113190-08-06 14:47:00





             Test Item    Value        Reference Range Interpretation Comments

 

             Phosphorus (test code = Phosphorus) 4.8          2.5-4.5           

        



[place] [address]-08-06 14:47:00





             Test Item    Value        Reference Range Interpretation Comments

 

             Albumin Lvl (test code = Albumin Lvl) 3.9          3.6-5.1         

          



[place] 629550-08-06 14:47:00





             Test Item    Value        Reference Range Interpretation Comments

 

             Plt Count Estimated (test code = DECREASED                         

     



             Plt Count Estimated)                                        



[facility]25[facility] 14:47:00





             Test Item    Value        Reference Range Interpretation Comments

 

             WBC X 10x3 (test code = WBC X 10x3) 7.2          3.8-10.8          

        



[place] 629550-08-06 14:47:00





             Test Item    Value        Reference Range Interpretation Comments

 

             RBC X 10x6 (test code = RBC X 10x6) 3.71         3.80-5.10         

        



[facility] HospitalYresiukQMJIUVEBNO2842-09-89 14:47:00





             Test Item    Value        Reference Range Interpretation Comments

 

             Hgb (test code = Hgb) 10.7         11.7-15.5                 



[facility] HospitalUeiluzcZKSLZNMUCK3144-37-16 14:47:00





             Test Item    Value        Reference Range Interpretation Comments

 

             Hct (test code = Hct) 33.9         35.0-45.0                 



[facility] HospitalHhpvxllGCDGQEJLVE5287-70-87 14:47:00





             Test Item    Value        Reference Range Interpretation Comments

 

             MCV (test code = MCV) 91.4         80.0-100.0                



[facility] HospitalJmftegbMDCJQUNQZN2896-64-40 14:47:00





             Test Item    Value        Reference Range Interpretation Comments

 

             MCH (test code = MCH) 28.8 pg      27.0-33.0                 



[facility] HospitalWozqxdnBRTZDBNOET3184-63-54 14:47:00





             Test Item    Value        Reference Range Interpretation Comments

 

             MCHC (test code = MCHC) 31.6         32.0-36.0                 



[facility] HospitalJscgkpjLEFOQJSIUJ8813-16-51 14:47:00





             Test Item    Value        Reference Range Interpretation Comments

 

             RDW (test code = RDW) 13.9         11.0-15.0                 



[place] 629550-08-06 14:47:00





             Test Item    Value        Reference Range Interpretation Comments

 

             Platelet (test code = Platelet) 110          140-400               

    



[facility] HospitalEbodrdvEDSCZJCPFW1988-55-66 14:47:00





             Test Item    Value        Reference Range Interpretation Comments

 

             MPV (test code = MPV) 11.7         7.5-12.5                  



[facility] HospitalVkskbajIUJGQGOSKM4408-86-03 14:47:00





             Test Item    Value        Reference Range Interpretation Comments

 

             Neutrophils # (test code = Neutrophils 5414         4245-4764      

           



             #)                                                  



[facility] HospitalEyfzbvzKIORNCEAIE0173-83-55 14:47:00





             Test Item    Value        Reference Range Interpretation Comments

 

             Lymphocytes # (test code = Lymphocytes 1152         850-3900       

           



             #)                                                  



[facility]0067[facility]51[facility]37 14:47:00





             Test Item    Value        Reference Range Interpretation Comments

 

             Monocytes # (test code = Monocytes #) 461          200-950         

          



[facility] HospitalXmbmbwrEZVEFMHJHH9263-08-95 14:47:00





             Test Item    Value        Reference Range Interpretation Comments

 

             Eosinophils # (test code = Eosinophils 122                   

           



             #)                                                  



[facility] HospitalAapoygjEACSVQAVJW0711-69-00 14:47:00





             Test Item    Value        Reference Range Interpretation Comments

 

             Basophils # (test code 50           See_Comment                [Aut

omated message] The



             = Basophils #)                                        system which 

generated



                                                                 this result tra

nsmitted



                                                                 reference range

: <=200.



                                                                 The reference r

cheyenne was



                                                                 not used to int

erpret



                                                                 this result as



                                                                 normal/abnormal

.



[facility] HospitalKhytnumDGZZMSPIHB3618-43-54 14:47:00





             Test Item    Value        Reference Range Interpretation Comments

 

             Segs (test code = Segs) 75.2                                   



[facility] HospitalQvxwtuzRBKJMOJOIW4428-10-70 14:47:00





             Test Item    Value        Reference Range Interpretation Comments

 

             Lymphocytes (test code = Lymphocytes) 16.0                         

          



[facility] HospitalJozhiolZTRAVFIYNP0190-31-04 14:47:00





             Test Item    Value        Reference Range Interpretation Comments

 

             Monocytes (test code = Monocytes) 6.4                              

      



[facility] HospitalBvpwnbjPUWWXHHZLR4981-41-35 14:47:00





             Test Item    Value        Reference Range Interpretation Comments

 

             Eosinophils (test code = Eosinophils) 1.7                          

          



[facility] HospitalYfxysboYSAJWELUFT5144-64-75 14:47:00





             Test Item    Value        Reference Range Interpretation Comments

 

             Basophils (test code = Basophils) 0.7                              

      



[facility] JRUSMVM8890-03-90 14:47:00





             Test Item    Value        Reference Range Interpretation Comments

 

             Result 2 (Urine Culture) See Result Comment                        

   



             (test code = Result 2                                        



             (Urine Culture))                                        



[facility] AND [id number] 14:47:00





             Test Item    Value        Reference Range Interpretation Comments

 

             UA Color (test code = UA Color) YELLOW                             

    



[facility] AND [id number] 14:47:00





             Test Item    Value        Reference Range Interpretation Comments

 

             UA Turbidity (test code = UA CLOUDY                                

 



             Turbidity)                                          



[facility] AND [id number] 14:47:00





             Test Item    Value        Reference Range Interpretation Comments

 

             UA Spec Grav (test code = UA Spec 1.017 1      1.001-1.035         

      



             Grav)                                               



[facility] AND [id number] 14:47:00





             Test Item    Value        Reference Range Interpretation Comments

 

             UA pH (test code = UA pH) 5.5 1        5.0-8.0                   



[facility]2020-08-06 14:47:00





             Test Item    Value        Reference Range Interpretation Comments

 

             Vitamin D, 25-OH, Total (test code = 37                      

         



             Vitamin D, 25-OH, Total)                                        



[facility] AND [id number] 14:47:00





             Test Item    Value        Reference Range Interpretation Comments

 

             UA Glucose (test code = UA Glucose) NEGATIVE                       

        



[facility]2020-08-06 14:47:00





             Test Item    Value        Reference Range Interpretation Comments

 

             U Creat mg/dL (test code = U Creat 114                       

       



             mg/dL)                                              



[facility]2020-08-06 14:47:00





             Test Item    Value        Reference Range Interpretation Comments

 

             U Prot/Creat (test code = U Prot/Creat) 430                  

            



[facility]2020-08-06 14:47:00





             Test Item    Value        Reference Range Interpretation Comments

 

             U Prot/Creat (test code = U 0.430 1      0.021-0.161               



             Prot/Creat)                                         



[facility]2020-08-06 14:47:00





             Test Item    Value        Reference Range Interpretation Comments

 

             U Protein (test code = U Protein) 49           5-24                

      



[place]190-08-06 14:47:00





             Test Item    Value        Reference Range Interpretation Comments

 

             Glucose Lvl (test code = Glucose Lvl) 103          65-99           

          



[facility]2020-08-06 14:47:00





             Test Item    Value        Reference Range Interpretation Comments

 

             BUN (test code = BUN) 24           7-25                      



[place] 113190-08-06 14:47:00





             Test Item    Value        Reference Range Interpretation Comments

 

             Creatinine Lvl (test code = Creatinine 1.32         0.50-0.99      

           



             Lvl)                                                



[facility]2020-08-06 14:47:00





             Test Item    Value        Reference Range Interpretation Comments

 

             eGFR NON-AFR. AMERICAN (test code = 43                             

        



             eGFR NON-AFR. AMERICAN)                                        



[facility]2020-08-06 14:47:00





             Test Item    Value        Reference Range Interpretation Comments

 

             eGFR  (test code = eGFR 50                         

            



             )                                        



[place] 113190-08-06 14:47:00





             Test Item    Value        Reference Range Interpretation Comments

 

             B/C Ratio (test code = B/C Ratio) 18           6-22                

      



[facility] AND [id number] 14:47:00





             Test Item    Value        Reference Range Interpretation Comments

 

             UA Bili (test code = UA Bili) NEGATIVE                             

  



[facility] [id number] 14:47:00





             Test Item    Value        Reference Range Interpretation Comments

 

             Sodium Lvl (test code = Sodium Lvl) 138          135-146           

        



[facility]2020-08-06 14:47:00





             Test Item    Value        Reference Range Interpretation Comments

 

             Potassium Lvl (test code = Potassium 4.4          3.5-5.3          

         



             Lvl)                                                



[facility]2020-08-06 14:47:00





             Test Item    Value        Reference Range Interpretation Comments

 

             Chloride Lvl (test code = Chloride Lvl) 102                  

            



[facility]2020-08-06 14:47:00





             Test Item    Value        Reference Range Interpretation Comments

 

             CO2 (test code = CO2) 30           20-32                     



[facility]2020-08-06 14:47:00





             Test Item    Value        Reference Range Interpretation Comments

 

             Calcium Lvl (test code = Calcium Lvl) 9.4          8.6-10.4        

          



[facility]2020-08-06 14:47:00





             Test Item    Value        Reference Range Interpretation Comments

 

             Phosphorus (test code = Phosphorus) 4.8          2.5-4.5           

        



[facility]2020-08-06 14:47:00





             Test Item    Value        Reference Range Interpretation Comments

 

             Albumin Lvl (test code = Albumin Lvl) 3.9          3.6-5.1         

          



[id number] 14[facility]47:00





             Test Item    Value        Reference Range Interpretation Comments

 

             Plt Count Estimated (test code = DECREASED                         

     



             Plt Count Estimated)                                        



[facility]OvhtlqeZXSRPWVSKN0476-31-07 14:47:00





             Test Item    Value        Reference Range Interpretation Comments

 

             WBC X 10x3 (test code = WBC X 10x3) 7.2          3.8-10.8          

        



[facility] HospitalGupgcalRCJZFNGAFT3884-12-31 14:47:00





             Test Item    Value        Reference Range Interpretation Comments

 

             RBC X 10x6 (test code = RBC X 10x6) 3.71         3.80-5.10         

        



[facility] AND [id number] 14:47:00





             Test Item    Value        Reference Range Interpretation Comments

 

             UA Ketones (test code = UA Ketones) NEGATIVE                       

        



[facility]LH[facility]61[facility]





             Test Item    Value        Reference Range Interpretation Comments

 

             Hgb (test code = Hgb) 10.7         11.7-15.5                 



[facility] HospitalGwjzugiTIWIYROATK2024-55-42 14:47:00





             Test Item    Value        Reference Range Interpretation Comments

 

             Hct (test code = Hct) 33.9         35.0-45.0                 



[facility] HospitalEzltizpUEGAHQNJBC5027-95-90 14:47:00





             Test Item    Value        Reference Range Interpretation Comments

 

             MCV (test code = MCV) 91.4         80.0-100.0                



[facility] HospitalLvnzdlqTGGIBAIGZF6224-05-17 14:47:00





             Test Item    Value        Reference Range Interpretation Comments

 

             MCH (test code = MCH) 28.8 pg      27.0-33.0                 



[facility] Ho[id number] 14:47:00





             Test Item    Value        Reference Range Interpretation Comments

 

             MCHC (test code = MCHC) 31.6         32.0-36.0                 



[facility] HospitalHtixvagBOKGINLNXU9146-92-88 14:47:00





             Test Item    Value        Reference Range Interpretation Comments

 

             RDW (test code = RDW) 13.9         11.0-15.0                 



[facility] HospitalZyhscfbJFTFTUWGKY8540-00-70 14:47:00





             Test Item    Value        Reference Range Interpretation Comments

 

             Platelet (test code = Platelet) 110          140-400               

    



[facility] HospitalZbfqlcrNLFRDQVCTN6134-62-39 14:47:00





             Test Item    Value        Reference Range Interpretation Comments

 

             MPV (test code = MPV) 11.7         7.5-12.5                  



[facility] HospitalSxolzdbEDTQVTEKBL2553-04-67 14:47:00





             Test Item    Value        Reference Range Interpretation Comments

 

             Neutrophils # (test code = Neutrophils 5414         1957-2716      

           



             #)                                                  



[facility] HospitalTlywbswRMXTLBCWEF5795-65-26 14:47:00





             Test Item    Value        Reference Range Interpretation Comments

 

             Lymphocytes # (test code = Lymphocytes 1152         850-3900       

           



             #)                                                  



[facility]2020-08-06 14:47:00





             Test Item    Value        Reference Range Interpretation Comments

 

             UA Blood (test code = UA Blood) 1+                                 

    



[facility] HospitalLbkkrtiTPBFVAAWSY3270-27-72 14:47:00





             Test Item    Value        Reference Range Interpretation Comments

 

             Monocytes # (test code = Monocytes #) 461          200-950         

          



[facility] HospitalFrlhgueZJWPLWLQKH5987-55-39 14:47:00





             Test Item    Value        Reference Range Interpretation Comments

 

             Eosinophils # (test code = Eosinophils 122                   

           



             #)                                                  



[facility] HospitalJapefmyZETEWVPALJ6316-57-18 14:47:00





             Test Item    Value        Reference Range Interpretation Comments

 

             Basophils # (test code 50           See_Comment                [Aut

omated message] The



             = Basophils #)                                        system which 

generated



                                                                 this result tra

nsmitted



                                                                 reference range

: <=200.



                                                                 The reference r

cheyenne was



                                                                 not used to int

erpret



                                                                 this result as



                                                                 normal/abnormal

.



[facility] HospitalOjuzzrtMOBSGGHZCI1379-22-41 14:47:00





             Test Item    Value        Reference Range Interpretation Comments

 

             Segs (test code = Segs) 75.2                                   



[facility] HospitalKselzjvSVAWEGHZPX2975-29-28 14:47:00





             Test Item    Value        Reference Range Interpretation Comments

 

             Lymphocytes (test code = Lymphocytes) 16.0                         

          



[facility] HospitalZahotfwSZXUXWKPWO6033-87-97 14:47:00





             Test Item    Value        Reference Range Interpretation Comments

 

             Monocytes (test code = Monocytes) 6.4                              

      



[facility] HospitalShmsfefWDBNXSBZBC3999-40-56 14:47:00





             Test Item    Value        Reference Range Interpretation Comments

 

             Eosinophils (test code = Eosinophils) 1.7                          

          



[facility] HospitalRpmjdpyVMOYAXDKBR2678-10-65 14:47:00





             Test Item    Value        Reference Range Interpretation Comments

 

             Basophils (test code = Basophils) 0.7                              

      



[facility] [id number] 14:47:00





             Test Item    Value        Reference Range Interpretation Comments

 

             Result 2 (Urine Culture) See Result Comment                        

   



             (test code = Result 2                                        



             (Urine Culture))                                        



[facility] AND [id number] 14:47:00





             Test Item    Value        Reference Range Interpretation Comments

 

             UA Color (test code = UA Color) YELLOW                             

    



[facility] AND [id number] 14:47:00





             Test Item    Value        Reference Range Interpretation Comments

 

             UA Protein (test code = UA Protein) 1+                             

        



[facility] AND [id number] 14:47:00





             Test Item    Value        Reference Range Interpretation Comments

 

             UA Turbidity (test code = UA CLOUDY                                

 



             Turbidity)                                          



[facility] AND [id number] 14:47:00





             Test Item    Value        Reference Range Interpretation Comments

 

             UA Spec Grav (test code = UA Spec 1.017 1      1.001-1.035         

      



             Grav)                                               



[facility] AND [id number] 14:47:00





             Test Item    Value        Reference Range Interpretation Comments

 

             UA pH (test code = UA pH) 5.5 1        5.0-8.0                   



[facility] [id number] 14:47:00





             Test Item    Value        Reference Range Interpretation Comments

 

             UA Glucose (test code = UA Glucose) NEGATIVE                       

        



[facility] [id number] 14:47:00





             Test Item    Value        Reference Range Interpretation Comments

 

             UA Bili (test code = UA Bili) NEGATIVE                             

  



[facility] [id number] 14:47:00





             Test Item    Value        Reference Range Interpretation Comments

 

             UA Ketones (test code = UA Ketones) NEGATIVE                       

        



[facility] [id number] 14:47:00





             Test Item    Value        Reference Range Interpretation Comments

 

             UA Blood (test code = UA Blood) 1+                                 

    



[facility] [id number] 14:47:00





             Test Item    Value        Reference Range Interpretation Comments

 

             UA Protein (test code = UA Protein) 1+                             

        



[facility] [id number] 14:47:00





             Test Item    Value        Reference Range Interpretation Comments

 

             UA Nitrite (test code = UA Nitrite) POSITIVE                       

        



[facility] [id number] 14:47:00





             Test Item    Value        Reference Range Interpretation Comments

 

             UA Leuk Est (test code = UA Leuk Est) 2+                           

          



[facility] [id number] 14:47:00





             Test Item    Value        Reference Range Interpretation Comments

 

             UA Nitrite (test code = UA Nitrite) POSITIVE                       

        



[facility] [id number] 14:47:00





             Test Item    Value        Reference Range Interpretation Comments

 

             UA WBC (test code = UA WBC) > OR = 60                              



[facility] [id number] 14:47:00





             Test Item    Value        Reference Range Interpretation Comments

 

             UA RBC (test code = UA RBC) 0-2                                    



[facility] [id number] 14:47:00





             Test Item    Value        Reference Range Interpretation Comments

 

             UA Sq Epi (test code = UA Sq Epi) NONE SEEN                        

      



[facility] [id number] 14:47:00





             Test Item    Value        Reference Range Interpretation Comments

 

             UA Bacteria (test code = UA Bacteria) MANY                         

          



[facility] [id number] 14:47:00





             Test Item    Value        Reference Range Interpretation Comments

 

             UA Hyal Cast (test code = UA Hyal NONE SEEN                        

      



             Cast)                                               



[facility] [id number] 14:47:00





             Test Item    Value        Reference Range Interpretation Comments

 

             UA Reflex (test code CULTURE INDICATED -                           



             = UA Reflex) RESULTS TO FOLLOW                           



[facility] [id number] 14:47:00





             Test Item    Value        Reference Range Interpretation Comments

 

             U Creat mg/dL (test code = U Creat 114                       

       



             mg/dL)                                              



[facility] [id number] 14:47:00





             Test Item    Value        Reference Range Interpretation Comments

 

             U Alb (test code = U Alb) 16.7                                   



[facility] [id number] 14:47:00





             Test Item    Value        Reference Range Interpretation Comments

 

             U Alb/Crea (test code = U Alb/Crea) 146                            

        



[facility] [id number] 14:47:00





             Test Item    Value        Reference Range Interpretation Comments

 

             UA Leuk Est (test code = UA Leuk Est) 2+                           

          



[facility] [id number] 14:47:00





             Test Item    Value        Reference Range Interpretation Comments

 

             UA WBC (test code = UA WBC) > OR = 60                              



[facility] AND [id number] 14:47:00





             Test Item    Value        Reference Range Interpretation Comments

 

             UA RBC (test code = UA RBC) 0-2                                    



[facility] [id number] 14:47:00





             Test Item    Value        Reference Range Interpretation Comments

 

             UA Sq Epi (test code = UA Sq Epi) NONE SEEN                        

      



[facility] [id number] 14:47:00





             Test Item    Value        Reference Range Interpretation Comments

 

             UA Bacteria (test code = UA Bacteria) MANY                         

          



[facility] [id number] 14:47:00





             Test Item    Value        Reference Range Interpretation Comments

 

             UA Hyal Cast (test code = UA Hyal NONE SEEN                        

      



             Cast)                                               



[facility] AND [id number] 14:47:00





             Test Item    Value        Reference Range Interpretation Comments

 

             UA Reflex (test code CULTURE INDICATED -                           



             = UA Reflex) RESULTS TO FOLLOW                           



[facility] [id number] 14:47:00





             Test Item    Value        Reference Range Interpretation Comments

 

             U Creat mg/dL (test code = U Creat 114                       

       



             mg/dL)                                              



[facility] [id number] 14:47:00





             Test Item    Value        Reference Range Interpretation Comments

 

             U Alb (test code = U Alb) 16.7                                   



[facility] [id number] 14:47:00





             Test Item    Value        Reference Range Interpretation Comments

 

             U Alb/Crea (test code = U Alb/Crea) 146                            

        



[facility] Hospital[facility] [id number] 14:47:00





             Test Item    Value        Reference Range Interpretation Comments

 

             Vitamin D, 25-OH, Total (test code = 37                      

         



             Vitamin D, 25-OH, Total)                                        



[facility] HospitalS[facility] LimitedFormer[facility] [id number] 14:47:00





             Test Item    Value        Reference Range Interpretation Comments

 

             U Creat mg/dL (test code = U Creat 114                       

       



             mg/dL)                                              



[facility] [id number] 14:47:00





             Test Item    Value        Reference Range Interpretation Comments

 

             U Prot/Creat (test code = U Prot/Creat) 430                  

            



[facility] [id number] 14:47:00





             Test Item    Value        Reference Range Interpretation Comments

 

             U Prot/Creat (test code = U 0.430 1      0.021-0.161               



             Prot/Creat)                                         



[facility] [id number] 14:47:00





             Test Item    Value        Reference Range Interpretation Comments

 

             U Protein (test code = U Protein) 49           5-24                

      



[facility] [id number] 14:47:00





             Test Item    Value        Reference Range Interpretation Comments

 

             Glucose Lvl (test code = Glucose Lvl) 103          65-99           

          



[facility] [id number] 14:47:00





             Test Item    Value        Reference Range Interpretation Comments

 

             BUN (test code = BUN) 24           7-25                      



[facility] [id number] 14:47:00





             Test Item    Value        Reference Range Interpretation Comments

 

             Creatinine Lvl (test code = Creatinine 1.32         0.50-0.99      

           



             Lvl)                                                



[facility] [id number] 14:47:00





             Test Item    Value        Reference Range Interpretation Comments

 

             eGFR NON-AFR. AMERICAN (test code = 43                             

        



             eGFR NON-AFR. AMERICAN)                                        



[facility] [id number] 14:47:00





             Test Item    Value        Reference Range Interpretation Comments

 

             eGFR  (test code = eGFR 50                         

            



             )                                        



[facility] [id number] 14:47:00





             Test Item    Value        Reference Range Interpretation Comments

 

             B/C Ratio (test code = B/C Ratio) 18           6-22                

      



[facility] [id number] 14:47:00





             Test Item    Value        Reference Range Interpretation Comments

 

             Sodium Lvl (test code = Sodium Lvl) 138          135-146           

        



[place] [address]-08-06 14:47:00





             Test Item    Value        Reference Range Interpretation Comments

 

             Potassium Lvl (test code = Potassium 4.4          3.5-5.3          

         



             Lvl)                                                



[place] 113190-08-06 14:47:00





             Test Item    Value        Reference Range Interpretation Comments

 

             Chloride Lvl (test code = Chloride Lvl) 102                  

            



[place] [address]-08-06 14:47:00





             Test Item    Value        Reference Range Interpretation Comments

 

             CO2 (test code = CO2) 30           20-32                     



[place] [address]-08-06 14:47:00





             Test Item    Value        Reference Range Interpretation Comments

 

             Calcium Lvl (test code = Calcium Lvl) 9.4          8.6-10.4        

          



[place] [address]-08-06 14:47:00





             Test Item    Value        Reference Range Interpretation Comments

 

             Phosphorus (test code = Phosphorus) 4.8          2.5-4.5           

        



[place] 113190-08-06 14:47:00





             Test Item    Value        Reference Range Interpretation Comments

 

             Albumin Lvl (test code = Albumin Lvl) 3.9          3.6-5.1         

          



[place] [address]-08-06 14:47:00





             Test Item    Value        Reference Range Interpretation Comments

 

             Plt Count Estimated (test code = DECREASED                         

     



             Plt Count Estimated)                                        



[place] 629550-08-06 14:47:00





             Test Item    Value        Reference Range Interpretation Comments

 

             WBC X 10x3 (test code = WBC X 10x3) 7.2          3.8-10.8          

        



[place] [address]-08-06 14:47:00





             Test Item    Value        Reference Range Interpretation Comments

 

             RBC X 10x6 (test code = RBC X 10x6) 3.71         3.80-5.10         

        



[place] [address]-08-06 14:47:00





             Test Item    Value        Reference Range Interpretation Comments

 

             Hgb (test code = Hgb) 10.7         11.7-15.5                 



[place] [address]-08-06 14:47:00





             Test Item    Value        Reference Range Interpretation Comments

 

             Hct (test code = Hct) 33.9         35.0-45.0                 



[place] [address]-08-06 14:47:00





             Test Item    Value        Reference Range Interpretation Comments

 

             MCV (test code = MCV) 91.4         80.0-100.0                



[place] [address]-08-06 14:47:00





             Test Item    Value        Reference Range Interpretation Comments

 

             MCH (test code = MCH) 28.8 pg      27.0-33.0                 



[facility]SjvgohpFDSFBSITVL6499[facility]58-30 14:47:00





             Test Item    Value        Reference Range Interpretation Comments

 

             MCHC (test code = MCHC) 31.6         32.0-36.0                 



[facility] HospitalKdbznavNMMTQPOOTM3140-48-43 14:47:00





             Test Item    Value        Reference Range Interpretation Comments

 

             RDW (test code = RDW) 13.9         11.0-15.0                 



[facility] HospitalQclkigvINDXMGYVFQ9068-18-12 14:47:00





             Test Item    Value        Reference Range Interpretation Comments

 

             Platelet (test code = Platelet) 110          140-400               

    



[facility] HospitalAmcgpuiCCWSGDZVOP9225-50-68 14:47:00





             Test Item    Value        Reference Range Interpretation Comments

 

             MPV (test code = MPV) 11.7         7.5-12.5                  



[facility] HospitalNcctiqnPHLDQTDTLV6126-25-33 14:47:00





             Test Item    Value        Reference Range Interpretation Comments

 

             Neutrophils # (test code = Neutrophils 5414         8151-4983      

           



             #)                                                  



[facility] HospitalXyqaiowIMZBVIZWTP7278-60-33 14:47:00





             Test Item    Value        Reference Range Interpretation Comments

 

             Lymphocytes # (test code = Lymphocytes 1152         850-3900       

           



             #)                                                  



[facility] HospitalJoqvlkxRSBOKQCTXR4886-55-00 14:47:00





             Test Item    Value        Reference Range Interpretation Comments

 

             Monocytes # (test code = Monocytes #) 461          200-950         

          



[facility] HospitalFypyicxLSIITHOGFX5330-86-28 14:47:00





             Test Item    Value        Reference Range Interpretation Comments

 

             Eosinophils # (test code = Eosinophils 122                   

           



             #)                                                  



[facility] HospitalPsvckcyGVUFTTVCZJ0997-11-24 14:47:00





             Test Item    Value        Reference Range Interpretation Comments

 

             Basophils # (test code 50           See_Comment                [Aut

omated message] The



             = Basophils #)                                        system which 

generated



                                                                 this result tra

nsmitted



                                                                 reference range

: <=200.



                                                                 The reference r

cheyenne was



                                                                 not used to int

erpret



                                                                 this result as



                                                                 normal/abnormal

.



[facility] HospitalVvtmeysZFQNNTIGGV5056-67-59 14:47:00





             Test Item    Value        Reference Range Interpretation Comments

 

             Segs (test code = Segs) 75.2                                   



[facility] HospitalBipextvWDZXSEBTTG6870-62-92 14:47:00





             Test Item    Value        Reference Range Interpretation Comments

 

             Lymphocytes (test code = Lymphocytes) 16.0                         

          



[facility] HospitalZiusvlkXXCTVHQGQA7249-65-03 14:47:00





             Test Item    Value        Reference Range Interpretation Comments

 

             Monocytes (test code = Monocytes) 6.4                              

      



[facility] HospitalNmkmdilPBPXIDMWLV3710-88-78 14:47:00





             Test Item    Value        Reference Range Interpretation Comments

 

             Eosinophils (test code = Eosinophils) 1.7                          

          



[facility] HospitalEvzhysgLPIZYBSKRS5802-62-95 14:47:00





             Test Item    Value        Reference Range Interpretation Comments

 

             Basophils (test code = Basophils) 0.7                              

      



[facility] [id number] 14:47:00





             Test Item    Value        Reference Range Interpretation Comments

 

             Result 2 (Urine Culture) See Result Comment                        

   



             (test code = Result 2                                        



             (Urine Culture))                                        



[facility] AND [id number] 14:47:00





             Test Item    Value        Reference Range Interpretation Comments

 

             UA Color (test code = UA Color) YELLOW                             

    



[facility] AND [id number] 14:47:00





             Test Item    Value        Reference Range Interpretation Comments

 

             UA Turbidity (test code = UA CLOUDY                                

 



             Turbidity)                                          



[facility] AND [id number] 14:47:00





             Test Item    Value        Reference Range Interpretation Comments

 

             UA Spec Grav (test code = UA Spec 1.017 1      1.001-1.035         

      



             Grav)                                               



[facility] AND [id number] 14:47:00





             Test Item    Value        Reference Range Interpretation Comments

 

             UA pH (test code = UA pH) 5.5 1        5.0-8.0                   



[facility] AND [id number] 14:47:00





             Test Item    Value        Reference Range Interpretation Comments

 

             UA Glucose (test code = UA Glucose) NEGATIVE                       

        



[facility] AND [id number] 14:47:00





             Test Item    Value        Reference Range Interpretation Comments

 

             UA Bili (test code = UA Bili) NEGATIVE                             

  



[facility] AND [id number] 14:47:00





             Test Item    Value        Reference Range Interpretation Comments

 

             UA Ketones (test code = UA Ketones) NEGATIVE                       

        



[facility] AND [id number] 14:47:00





             Test Item    Value        Reference Range Interpretation Comments

 

             UA Blood (test code = UA Blood) 1+                                 

    



[facility] AND [id number] 14:47:00





             Test Item    Value        Reference Range Interpretation Comments

 

             UA Protein (test code = UA Protein) 1+                             

        



[facility] AND [id number] 14:47:00





             Test Item    Value        Reference Range Interpretation Comments

 

             UA Nitrite (test code = UA Nitrite) POSITIVE                       

        



[facility] AND [id number] 14:47:00





             Test Item    Value        Reference Range Interpretation Comments

 

             UA Leuk Est (test code = UA Leuk Est) 2+                           

          



[facility] [id number] 14:47:00





             Test Item    Value        Reference Range Interpretation Comments

 

             UA WBC (test code = UA WBC) > OR = 60                              



[facility] [id number] 14:47:00





             Test Item    Value        Reference Range Interpretation Comments

 

             UA RBC (test code = UA RBC) 0-2                                    



[facility] [id number] 14:47:00





             Test Item    Value        Reference Range Interpretation Comments

 

             UA Sq Epi (test code = UA Sq Epi) NONE SEEN                        

      



[facility] [id number] 14:47:00





             Test Item    Value        Reference Range Interpretation Comments

 

             UA Bacteria (test code = UA Bacteria) MANY                         

          



[facility] [id number] 14:47:00





             Test Item    Value        Reference Range Interpretation Comments

 

             UA Hyal Cast (test code = UA Hyal NONE SEEN                        

      



             Cast)                                               



[facility] AND [id number] 14:47:00





             Test Item    Value        Reference Range Interpretation Comments

 

             UA Reflex (test code CULTURE INDICATED -                           



             = UA Reflex) RESULTS TO FOLLOW                           



[facility] [id number] 14:47:00





             Test Item    Value        Reference Range Interpretation Comments

 

             U Creat mg/dL (test code = U Creat 114                       

       



             mg/dL)                                              



[facility] [id number] 14:47:00





             Test Item    Value        Reference Range Interpretation Comments

 

             U Alb (test code = U Alb) 16.7                                   



[facility] [id number] 14:47:00





             Test Item    Value        Reference Range Interpretation Comments

 

             U Alb/Crea (test code = U Alb/Crea) 146                            

        



[facility] [id number] 14:47:00





             Test Item    Value        Reference Range Interpretation Comments

 

             Vitamin D, 25-OH, Total (test code = 37                      

         



             Vitamin D, 25-OH, Total)                                        



[facility] [id number] 14:47:00





             Test Item    Value        Reference Range Interpretation Comments

 

             U Creat mg/dL (test code = U Creat 114                       

       



             mg/dL)                                              



[facility] [id number] 14:47:00





             Test Item    Value        Reference Range Interpretation Comments

 

             U Prot/Creat (test code = U Prot/Creat) 430                  

            



[facility]2020-08-06 14:47:00





             Test Item    Value        Reference Range Interpretation Comments

 

             U Prot/Creat (test code = U 0.430 1      0.021-0.161               



             Prot/Creat)                                         



[facility]2020-08-06 14:47:00





             Test Item    Value        Reference Range Interpretation Comments

 

             U Protein (test code = U Protein) 49           5-24                

      



[place] 113190-08-06 14:47:00





             Test Item    Value        Reference Range Interpretation Comments

 

             Glucose Lvl (test code = Glucose Lvl) 103          65-99           

          



[place] 113190-08-06 14:47:00





             Test Item    Value        Reference Range Interpretation Comments

 

             BUN (test code = BUN) 24           7-25                      



[facility]2020-08-06 14:47:00





             Test Item    Value        Reference Range Interpretation Comments

 

             Creatinine Lvl (test code = Creatinine 1.32         0.50-0.99      

           



             Lvl)                                                



[facility]2020-08-06 14:47:00





             Test Item    Value        Reference Range Interpretation Comments

 

             eGFR NON-AFR. AMERICAN (test code = 43                             

        



             eGFR NON-AFR. AMERICAN)                                        



[facility]2020-08-06 14:47:00





             Test Item    Value        Reference Range Interpretation Comments

 

             eGFR  (test code = eGFR 50                         

            



             )                                        



[facility]2020-08-06 14:47:00





             Test Item    Value        Reference Range Interpretation Comments

 

             B/C Ratio (test code = B/C Ratio) 18           6-22                

      



[facility]2020-08-06 14:47:00





             Test Item    Value        Reference Range Interpretation Comments

 

             Sodium Lvl (test code = Sodium Lvl) 138          135-146           

        



[facility]2020-08-06 14:47:00





             Test Item    Value        Reference Range Interpretation Comments

 

             Potassium Lvl (test code = Potassium 4.4          3.5-5.3          

         



             Lvl)                                                



[facility]2020-08-06 14:47:00





             Test Item    Value        Reference Range Interpretation Comments

 

             Chloride Lvl (test code = Chloride Lvl) 102                  

            



[place]190-08-06 14:47:00





             Test Item    Value        Reference Range Interpretation Comments

 

             CO2 (test code = CO2) 30           20-32                     



[place] 113190-08-06 14:47:00





             Test Item    Value        Reference Range Interpretation Comments

 

             Calcium Lvl (test code = Calcium Lvl) 9.4          8.6-10.4        

          



[facility]2020-08-06 14:47:00





             Test Item    Value        Reference Range Interpretation Comments

 

             Phosphorus (test code = Phosphorus) 4.8          2.5-4.5           

        



[facility] [id number] 14:47:00





             Test Item    Value        Reference Range Interpretation Comments

 

             Albumin Lvl (test code = Albumin Lvl) 3.9          3.6-5.1         

          



[place] 629550-08-06 14:47:00





             Test Item    Value        Reference Range Interpretation Comments

 

             Plt Count Estimated (test code = DECREASED                         

     



             Plt Count Estimated)                                        



[facility] HospitalJyjitkbRIVGAKXMXR3981-36-09 14:47:00





             Test Item    Value        Reference Range Interpretation Comments

 

             WBC X 10x3 (test code = WBC X 10x3) 7.2          3.8-10.8          

        



[place] 629550-08-06 14:47:00





             Test Item    Value        Reference Range Interpretation Comments

 

             RBC X 10x6 (test code = RBC X 10x6) 3.71         3.80-5.10         

        



[place] [address]-08-06 14:47:00





             Test Item    Value        Reference Range Interpretation Comments

 

             Hgb (test code = Hgb) 10.7         11.7-15.5                 



[facility] HospitalCywuvssTVTSHOULYH1840-84-37 14:47:00





             Test Item    Value        Reference Range Interpretation Comments

 

             Hct (test code = Hct) 33.9         35.0-45.0                 



[facility] HospitalFdwidzgPYGBDBBRAL0722-30-92 14:47:00





             Test Item    Value        Reference Range Interpretation Comments

 

             MCV (test code = MCV) 91.4         80.0-100.0                



[place] 629550-08-06 14:47:00





             Test Item    Value        Reference Range Interpretation Comments

 

             MCH (test code = MCH) 28.8 pg      27.0-33.0                 



[place] 629550-08-06 14:47:00





             Test Item    Value        Reference Range Interpretation Comments

 

             MCHC (test code = MCHC) 31.6         32.0-36.0                 



[place] 629550-08-06 14:47:00





             Test Item    Value        Reference Range Interpretation Comments

 

             RDW (test code = RDW) 13.9         11.0-15.0                 



[place] 629550-08-06 14:47:00





             Test Item    Value        Reference Range Interpretation Comments

 

             Platelet (test code = Platelet) 110          140-400               

    



[facility] HospitalWncmdjfXWSLRCEARP5391-99-17 14:47:00





             Test Item    Value        Reference Range Interpretation Comments

 

             MPV (test code = MPV) 11.7         7.5-12.5                  



[facility] HospitalHqxjtswORALDMZVMC5863-79-89 14:47:00





             Test Item    Value        Reference Range Interpretation Comments

 

             Neutrophils # (test code = Neutrophils 5414         9421-5819      

           



             #)                                                  



[facility] HospitalOpcmqtoVEFMVFZBQC8198-36-33 14:47:00





             Test Item    Value        Reference Range Interpretation Comments

 

             Lymphocytes # (test code = Lymphocytes 1152         850-3900       

           



             #)                                                  



[facility] HospitalTxzioebERGIWNPIPH2780-45-39 14:47:00





             Test Item    Value        Reference Range Interpretation Comments

 

             Monocytes # (test code = Monocytes #) 461          200-950         

          



[facility] HospitalDejadfwSGROYIFZGE7899-16-07 14:47:00





             Test Item    Value        Reference Range Interpretation Comments

 

             Eosinophils # (test code = Eosinophils 122                   

           



             #)                                                  



[facility] CenterOfbgvayTMAMTADRJS5555-39-13 14:47:00





             Test Item    Value        Reference Range Interpretation Comments

 

             Basophils # (test code 50           See_Comment                [Aut

omated message] The



             = Basophils #)                                        system which 

generated



                                                                 this result tra

nsmitted



                                                                 reference range

: <=200.



                                                                 The reference r

cheyenne was



                                                                 not used to int

erpret



                                                                 this result as



                                                                 normal/abnormal

.



[facility] CenterElmtvhcJHBTJLRWJL6201-77-15 14:47:00





             Test Item    Value        Reference Range Interpretation Comments

 

             Segs (test code = Segs) 75.2                                   



[facility] CenterIngnbrlBTDJSWVSYB2539-97-45 14:47:00





             Test Item    Value        Reference Range Interpretation Comments

 

             Lymphocytes (test code = Lymphocytes) 16.0                         

          



[facility] HospitalAfhzfcjKYQFZANJLL2168-65-72 14:47:00





             Test Item    Value        Reference Range Interpretation Comments

 

             Monocytes (test code = Monocytes) 6.4                              

      



[facility] HospitalZklpuzmBOOPXSVNUJ2185-30-43 14:47:00





             Test Item    Value        Reference Range Interpretation Comments

 

             Eosinophils (test code = Eosinophils) 1.7                          

          



[facility] HospitalFrfgflzFBUKYPOJXS3280-30-65 14:47:00





             Test Item    Value        Reference Range Interpretation Comments

 

             Basophils (test code = Basophils) 0.7                              

      



[facility] [id number] 14:47:00





             Test Item    Value        Reference Range Interpretation Comments

 

             Result 2 (Urine Culture) See Result Comment                        

   



             (test code = Result 2                                        



             (Urine Culture))                                        



[facility] AND [id number] 14:47:00





             Test Item    Value        Reference Range Interpretation Comments

 

             UA Color (test code = UA Color) YELLOW                             

    



[facility] AND [id number] 14:47:00





             Test Item    Value        Reference Range Interpretation Comments

 

             UA Turbidity (test code = UA CLOUDY                                

 



             Turbidity)                                          



[facility] AND [id number] 14:47:00





             Test Item    Value        Reference Range Interpretation Comments

 

             UA Spec Grav (test code = UA Spec 1.017 1      1.001-1.035         

      



             Grav)                                               



[facility] AND [id number] 14:47:00





             Test Item    Value        Reference Range Interpretation Comments

 

             UA pH (test code = UA pH) 5.5 1        5.0-8.0                   



[facility] AND [id number] 14:47:00





             Test Item    Value        Reference Range Interpretation Comments

 

             UA Glucose (test code = UA Glucose) NEGATIVE                       

        



[facility] AND [id number] 14:47:00





             Test Item    Value        Reference Range Interpretation Comments

 

             UA Bili (test code = UA Bili) NEGATIVE                             

  



[facility] AND [id number] 14:47:00





             Test Item    Value        Reference Range Interpretation Comments

 

             UA Ketones (test code = UA Ketones) NEGATIVE                       

        



[facility] AND [id number] 14:47:00





             Test Item    Value        Reference Range Interpretation Comments

 

             UA Blood (test code = UA Blood) 1+                                 

    



[facility] AND [id number] 14:47:00





             Test Item    Value        Reference Range Interpretation Comments

 

             UA Protein (test code = UA Protein) 1+                             

        



[facility] AND [id number] 14:47:00





             Test Item    Value        Reference Range Interpretation Comments

 

             UA Nitrite (test code = UA Nitrite) POSITIVE                       

        



[facility] AND [id number] 14:47:00





             Test Item    Value        Reference Range Interpretation Comments

 

             UA Leuk Est (test code = UA Leuk Est) 2+                           

          



[facility] AND [id number] 14:47:00





             Test Item    Value        Reference Range Interpretation Comments

 

             UA WBC (test code = UA WBC) > OR = 60                              



[facility] AND [id number] 14:47:00





             Test Item    Value        Reference Range Interpretation Comments

 

             UA RBC (test code = UA RBC) 0-2                                    



[facility] AND [id number] 14:47:00





             Test Item    Value        Reference Range Interpretation Comments

 

             UA Sq Epi (test code = UA Sq Epi) NONE SEEN                        

      



[facility] [id number] 14:47:00





             Test Item    Value        Reference Range Interpretation Comments

 

             UA Bacteria (test code = UA Bacteria) MANY                         

          



[facility] [id number] 14:47:00





             Test Item    Value        Reference Range Interpretation Comments

 

             UA Hyal Cast (test code = UA Hyal NONE SEEN                        

      



             Cast)                                               



[facility] [id number] 14:47:00





             Test Item    Value        Reference Range Interpretation Comments

 

             UA Reflex (test code CULTURE INDICATED -                           



             = UA Reflex) RESULTS TO FOLLOW                           



[facility] [id number] 14:47:00





             Test Item    Value        Reference Range Interpretation Comments

 

             U Creat mg/dL (test code = U Creat 114                       

       



             mg/dL)                                              



[facility] [id number] 14:47:00





             Test Item    Value        Reference Range Interpretation Comments

 

             U Alb (test code = U Alb) 16.7                                   



[facility] [id number] 14:47:00





             Test Item    Value        Reference Range Interpretation Comments

 

             U Alb/Crea (test code = U Alb/Crea) 146                            

        



[facility] PROTEIN ELECTROPHORESIS-24HR [id number] 08:01:00





             Test Item    Value        Reference Range Interpretation Comments

 

             CREATININE, 24 HOUR 1.45 g/24 h  0.50-2.15                 



             URINE (test code =                                        



             51660570)                                           

 

             PROTEIN/CREATININE 292 mg/g creat < OR = 114   H            



             RATION (test code =                                        



             64279912)                                           

 

             PROTEIN, TOTAL 24 HR  mg/24 h  <150         H            TEST

 PERFORMED



             (test code = 25092958)                                        AT:QU

EST



                                                                 [facility]

IN



                                                                 [address] [place] [address]



                                                                 [id number]

ARCHIE FELIX MD

 

             ALBUMIN (test code = 35 %                                   



             61693710)                                           

 

             ALPHA-1-GLOBULINS (test 10 %                                   



             code = 27537057)                                        

 

             ALPHA-2-GLOBULINS (test 12 %                                   



             code = 16433215)                                        

 

             BETA GLOBULINS (test 25 %                                   



             code = 27281670)                                        

 

             GAMMA GLOBULINS (test 18 %                                   



             code = 54033856)                                        

 

             INTERPRETATION (test                                        Albumin

 and



             code = 77542316)                                        various aba

bulin



                                                                 fractions



                                                                 detected on



                                                                 proteinelectrop

ho



                                                                 resis. No



                                                                 abnormal protei

n



                                                                 bands



                                                                 (Bence-Jonespro

te



                                                                 inuria)



                                                                 detected.TEST



                                                                 PERFORMED



                                                                 AT:[employer]

IN



                                                                  [facility].RIDDHI ALVAREZ



                                                                 [id number] FELIX MD



NEUTROPHIL CYTOPLASMIC [id number] 05:19:00





             Test Item    Value        Reference Range Interpretation Comments

 

             ANCA SCREEN (test NEGATIVE     NEGATIVE                  ANCA Scree

n includes



             code = 88017589)                                        evaluation 

for p-ANCA,



                                                                 c-ANCA andatypi

tayla p-ANCA.



                                                                 A positive ANCA

 screen



                                                                 reflexes to tit

erand



                                                                 pattern(s), e.g

.,



                                                                 cytoplasmic pat

tern



                                                                 (c-ANCA),perinu

clear



                                                                 pattern (p-ANCA

), or



                                                                 atypical p-ANCA



                                                                 pattern.c-ANCA 

and p-ANCA



                                                                 are observed in

 vasculitis,



                                                                 whereasatypical

 p-ANCA is



                                                                 observed in IBD



                                                                 (Inflammatory



                                                                 BowelDisease). 

Atypical



                                                                 p-ANCA is detec

kolby in about



                                                                 55% to 80% ofpa

tients with



                                                                 ulcerative coli

tis but only



                                                                 5% to 25% ofpat

ients with



                                                                 Crohn's disease

.TEST



                                                                 PERFORMED AT:[facility]



                                                                 [facility]



                                                                 [id number], [place]



                                                                 [id number] MARIEE MD,PHD

,RAMILA



COMPREHENSIVE METABOLIC [id number] 06:25:00





             Test Item    Value        Reference Range Interpretation Comments

 

             GLUCOSE (test code = 06D) 115 mg/dL           H            

 

             SODIUM (test code = 01A) 137 mmol/L   136-145                   

 

             POTASSIUM (test code = 01B) 3.3 mmol/L   3.6-5.1      L            

 

             CHLORIDE (test code = 04A) 97 mmol/L           L            

 

             CO2 (test code = 02A) 32 mmol/L    22-32                     

 

             ANION GAP (test code = ANG) 11.3 mmol/L                            

 

             BUN (test code = 05D) 36 mg/dL     7-18         H            

 

             CREATININE (test code = 03E) 1.4 mg/dL    0.4-1.1      H           

 

 

             BUN/CREA (test code = BCR) 25           12-20        H            

 

             CALCIUM (test code = 09D) 8.8 mg/dL    8.3-9.5                   

 

             BILI TOTAL (test code = 11A) 1.2 mg/dL    0.2-1.0      H           

 

 

             PROTEIN (test code = 07D) 6.5 g/dL     6.4-8.2                   

 

             ALBUMIN (test code = 08D) 2.9 g/dL     3.5-4.8      L            

 

             GLOBULIN (test code = GLB) 3.6 g/dL     1.5-3.8                   

 

             ALB/GLOB (test code = AGRR) 0.8          1.0-2.6      L            

 

             ALK PHOS (test code = 35A) 97 IU/L                          

 

             AST (test code = 30A) 15 IU/L      <=42                      

 

             ALT (test code = 31A) 35 IU/L      <=78                      



CBC (INCLUDES AUTOMATED DIFFERENTIAL)2019 06:06:00





             Test Item    Value        Reference Range Interpretation Comments

 

             WBC (test code = WBC) 6.9 10\S\3/uL 4.5-11.0                  

 

             RBC (test code = RBC) 3.56 10\S\6/uL 4.20-5.60    L            

 

             HGB (test code = HBG) 10.4 g/dL    12.0-15.5    L            

 

             HCT (test code = HCT) 32.1 %       35.0-44.0    L            

 

             MCV (test code = MCV) 90.2 fL      81.0-99.0                 

 

             MCH (test code = MCH) 29.2 pg      27.0-31.0                 

 

             MCHC (test code = MCHC) 32.4 g/dL    32.0-36.0                 

 

             RDW (test code = RDW) 15.0 %       11.5-14.5    H            

 

             PLT (test code = PLT) 158 10\S\3/uL 130-400                   

 

             MPV (test code = MPV) 10.7 fL      9.4-12.4                  

 

             NEUTROP # (test code = NE#) 4.4 10\S\3/uL 1.6-8.0                  

 

 

             LYMPH # (test code = LY#) 1.8 10\S\3/uL 1.1-3.5                   

 

             MONOCYTE # (test code = MO#) 0.5 10\S\3/uL 0.0-1.1                 

  

 

             EOSINOPH # (test code = EO#) 0.2 10\S\3/uL 0.0-0.7                 

  

 

             BASOPHIL # (test code = BA#) 0.0 10\S\3/uL 0.0-0.3                 

  

 

             IG # (test code = IG#) 0.03 10\S\3/uL 0.00-0.06                 

 

             NRBC # (test code = NRBC#) 0.00 10\S\3/uL 0.00-0.01                

 

 

             NEUTROPH % (test code = NE%) 64.0 %       35.0-73.0                

 

 

             LYMPH % (test code = LY%) 26.1 %       20.0-55.0                 

 

             MONO % (test code = MO%) 6.5 %        2.5-10.0                  

 

             EOSINOPH % (test code = EO%) 2.6 %        0.0-5.0                  

 

 

             BASOPHIL % (test code = BA%) 0.4 %        0.0-2.0                  

 

 

             IG % (test code = IG%) 0.4 %        0.0-0.8                   

 

             NRBC% (test code = NRBC%) 0.0 %        0.0-0.2                   

 

             MANDIFF (test code = MDIFF) NO           NO                        

 

             RBC MORPH (test code = RBCMOR)              NORMAL                 

   



URINE [id number] 09:53:00





             Test Item    Value        Reference Range Interpretation Comments

 

             Isolate 1 (test code = Proteus mirabilis              A            



             ISO1)                                               

 

             ampicillin (test code = am)  ug/mL                    S            

 

             ampicillin/sulbactam (test  ug/mL                    S            



             code = ams)                                         

 

             piperacillin/tazobactam  ug/mL                    S            



             (test code = tzp)                                        

 

             ceftazidime (test code =  ug/mL                    S            



             jeannine)                                                

 

             ceftriaxone1 (test code =  ug/mL                    S            



             ctr)                                                

 

             cefepime (test code = fep)  ug/mL                    S            

 

             aztreonam (test code = azm)  ug/mL                    S            

 

             ertapenem (test code = etp)  ug/mL                    S            

 

             meropenem (test code = mem)  ug/mL                    S            

 

             gentamicin (test code = gm)  ug/mL                    S            

 

             tobramycin (test code =  ug/mL                    S            



             tob)                                                

 

             levofloxacin (test code =  ug/mL                    S            



             lev)                                                

 

             trimethoprim/sulfamethoxazo  ug/mL                    S            



             le (test code = sxt)                                        



PARTHYROID HORMONE (INTACT)2019 08:24:00





             Test Item    Value        Reference Range Interpretation Comments

 

             PTH INTACT (test code 166.6 pg/mL  18.4-80.1    H            



             = A85)                                              

 

             Ref Range Change ***** Please note the                           



             (test code = REF change in reference                           



             RANGE)       range ***                              



VITAMIN D TOTAL 25 (OH)2019 07:15:00





             Test Item    Value        Reference Range Interpretation Comments

 

             VITAMIN D 25(OH) (test code = VD) 36.0 ng/mL   30.0-100.0          

      



SERUM PROTEIN [id number] 06:20:00





             Test Item    Value        Reference Range Interpretation Comments

 

             PROTEIN TOTAL (test 5.7 g/dL     6.1-8.1      L            TEST PER

FORMED AT:[facility]



             code = 03619983)                                        DIAGNOSTICS

[id number]



                                                                 [facility] [place]



                                                                 [id number] FELIX MD

 

             ALBUMIN (test code = 3.2 g/dL     3.8-4.8      L            



             91764879)                                           

 

             ALPHA-1-GLOBULINS 0.4 g/dL     0.2-0.3      H            



             (test code =                                        



             19722935)                                           

 

             ALPHA-2-GLOBULINS 0.8 g/dL     0.5-0.9                   



             (test code =                                        



             66978536)                                           

 

             BETA 1 GLOBULIN (test 0.5 g/dL     0.4-0.6                   



             code = 57216172)                                        

 

             BETA 2 GLOBULIN (test 0.2 g/dL     0.2-0.5                   



             code = 38061732)                                        

 

             GAMMA GLOBULINS (test 0.6 g/dL     0.8-1.7      L            



             code = 95522385)                                        

 

             INTERPRETATION (test                                        Pattern

 consistent with



             code = 65900777)                                        an acute ph

ase



                                                                 reactionConsist

ent with



                                                                 hypogammaglobul

inemia.



                                                                 Serum free ligh

tchains



                                                                 or urine immuno

fixation



                                                                 should be consi

dered



                                                                 ifplasma cell d

yscrasias



                                                                 are a possible



                                                                 clinicaldiagnos

is.TEST



                                                                 PERFORMED AT:QU

EST



                                                                 [facility])[address]



                                                                 [facility] [place]



                                                                 [address]PSCYZELVIRA FELIX MD



[id number] 06:13:00





             Test Item    Value        Reference Range Interpretation Comments

 

             MAGNESIUM (test code = 48A) 1.7 mg/dL    1.8-2.4      L            



COMPREHENSIVE METABOLIC [id number] 06:13:00





             Test Item    Value        Reference Range Interpretation Comments

 

             GLUCOSE (test code = 06D) 110 mg/dL           H            

 

             SODIUM (test code = 01A) 140 mmol/L   136-145                   

 

             POTASSIUM (test code = 01B) 3.1 mmol/L   3.6-5.1      L            

 

             CHLORIDE (test code = 04A) 96 mmol/L           L            

 

             CO2 (test code = 02A) 34 mmol/L    22-32        H            

 

             ANION GAP (test code = ANG) 13.1 mmol/L                            

 

             BUN (test code = 05D) 33 mg/dL     7-18         H            

 

             CREATININE (test code = 03E) 1.5 mg/dL    0.4-1.1      H           

 

 

             BUN/CREA (test code = BCR) 22           12-20        H            

 

             CALCIUM (test code = 09D) 9.1 mg/dL    8.3-9.5                   

 

             BILI TOTAL (test code = 11A) 1.4 mg/dL    0.2-1.0      H           

 

 

             PROTEIN (test code = 07D) 7.0 g/dL     6.4-8.2                   

 

             ALBUMIN (test code = 08D) 3.2 g/dL     3.5-4.8      L            

 

             GLOBULIN (test code = GLB) 3.8 g/dL     1.5-3.8                   

 

             ALB/GLOB (test code = AGRR) 0.8          1.0-2.6      L            

 

             ALK PHOS (test code = 35A) 111 IU/L                         

 

             AST (test code = 30A) 18 IU/L      <=42                      

 

             ALT (test code = 31A) 43 IU/L      <=78                      



PHOSPHORUS (P04)2019 06:13:00





             Test Item    Value        Reference Range Interpretation Comments

 

             PHOSPHORUS (test code = 43D) 4.0 mg/dL    2.7-4.6                  

 



CBC (INCLUDES AUTOMATED DIFFERENTIAL)2019 05:48:00





             Test Item    Value        Reference Range Interpretation Comments

 

             WBC (test code = WBC) 9.4 10\S\3/uL 4.5-11.0                  

 

             RBC (test code = RBC) 3.73 10\S\6/uL 4.20-5.60    L            

 

             HGB (test code = HBG) 10.8 g/dL    12.0-15.5    L            

 

             HCT (test code = HCT) 33.8 %       35.0-44.0    L            

 

             MCV (test code = MCV) 90.6 fL      81.0-99.0                 

 

             MCH (test code = MCH) 29.0 pg      27.0-31.0                 

 

             MCHC (test code = MCHC) 32.0 g/dL    32.0-36.0                 

 

             RDW (test code = RDW) 15.1 %       11.5-14.5    H            

 

             PLT (test code = PLT) 189 10\S\3/uL 130-400                   

 

             MPV (test code = MPV) 11.8 fL      9.4-12.4                  

 

             NEUTROP # (test code = NE#) 7.0 10\S\3/uL 1.6-8.0                  

 

 

             LYMPH # (test code = LY#) 1.6 10\S\3/uL 1.1-3.5                   

 

             MONOCYTE # (test code = MO#) 0.7 10\S\3/uL 0.0-1.1                 

  

 

             EOSINOPH # (test code = EO#) 0.1 10\S\3/uL 0.0-0.7                 

  

 

             BASOPHIL # (test code = BA#) 0.0 10\S\3/uL 0.0-0.3                 

  

 

             IG # (test code = IG#) 0.06 10\S\3/uL 0.00-0.06                 

 

             NRBC # (test code = NRBC#) 0.00 10\S\3/uL 0.00-0.01                

 

 

             NEUTROPH % (test code = NE%) 74.5 %       35.0-73.0    H           

 

 

             LYMPH % (test code = LY%) 16.5 %       20.0-55.0    L            

 

             MONO % (test code = MO%) 7.0 %        2.5-10.0                  

 

             EOSINOPH % (test code = EO%) 1.1 %        0.0-5.0                  

 

 

             BASOPHIL % (test code = BA%) 0.3 %        0.0-2.0                  

 

 

             IG % (test code = IG%) 0.6 %        0.0-0.8                   

 

             NRBC% (test code = NRBC%) 0.0 %        0.0-0.2                   

 

             MANDIFF (test code = MDIFF) NO           NO                        

 

             RBC MORPH (test code = RBCMOR)              NORMAL                 

   



COMPREHENSIVE METABOLIC [id number] 05:30:00





             Test Item    Value        Reference Range Interpretation Comments

 

             GLUCOSE (test code = 06D) 122 mg/dL           H            

 

             SODIUM (test code = 01A) 140 mmol/L   136-145                   

 

             POTASSIUM (test code = 01B) 3.8 mmol/L   3.6-5.1                   

 

             CHLORIDE (test code = 04A) 100 mmol/L                       

 

             CO2 (test code = 02A) 32 mmol/L    22-32                     

 

             ANION GAP (test code = ANG) 11.8 mmol/L                            

 

             BUN (test code = 05D) 29 mg/dL     7-18         H            

 

             CREATININE (test code = 03E) 1.5 mg/dL    0.4-1.1      H           

 

 

             BUN/CREA (test code = BCR) 20           12-20                     

 

             CALCIUM (test code = 09D) 9.0 mg/dL    8.3-9.5                   

 

             BILI TOTAL (test code = 11A) 1.3 mg/dL    0.2-1.0      H           

 

 

             PROTEIN (test code = 07D) 7.1 g/dL     6.4-8.2                   

 

             ALBUMIN (test code = 08D) 3.5 g/dL     3.5-4.8                   

 

             GLOBULIN (test code = GLB) 3.6 g/dL     1.5-3.8                   

 

             ALB/GLOB (test code = AGRR) 1.0          1.0-2.6                   

 

             ALK PHOS (test code = 35A) 119 IU/L                         

 

             AST (test code = 30A) 22 IU/L      <=42                      

 

             ALT (test code = 31A) 49 IU/L      <=78                      



CBC (INCLUDES AUTOMATED DIFFERENTIAL)2019 05:18:00





             Test Item    Value        Reference Range Interpretation Comments

 

             WBC (test code = WBC) 9.0 10\S\3/uL 4.5-11.0                  

 

             RBC (test code = RBC) 3.94 10\S\6/uL 4.20-5.60    L            

 

             HGB (test code = HBG) 11.4 g/dL    12.0-15.5    L            

 

             HCT (test code = HCT) 35.8 %       35.0-44.0                 

 

             MCV (test code = MCV) 90.9 fL      81.0-99.0                 

 

             MCH (test code = MCH) 28.9 pg      27.0-31.0                 

 

             MCHC (test code = MCHC) 31.8 g/dL    32.0-36.0    L            

 

             RDW (test code = RDW) 14.8 %       11.5-14.5    H            

 

             PLT (test code = PLT) 164 10\S\3/uL 130-400                   

 

             MPV (test code = MPV) 11.6 fL      9.4-12.4                  

 

             NEUTROP # (test code = NE#) 6.8 10\S\3/uL 1.6-8.0                  

 

 

             LYMPH # (test code = LY#) 1.4 10\S\3/uL 1.1-3.5                   

 

             MONOCYTE # (test code = MO#) 0.6 10\S\3/uL 0.0-1.1                 

  

 

             EOSINOPH # (test code = EO#) 0.1 10\S\3/uL 0.0-0.7                 

  

 

             BASOPHIL # (test code = BA#) 0.0 10\S\3/uL 0.0-0.3                 

  

 

             IG # (test code = IG#) 0.06 10\S\3/uL 0.00-0.06                 

 

             NRBC # (test code = NRBC#) 0.00 10\S\3/uL 0.00-0.01                

 

 

             NEUTROPH % (test code = NE%) 75.6 %       35.0-73.0    H           

 

 

             LYMPH % (test code = LY%) 15.9 %       20.0-55.0    L            

 

             MONO % (test code = MO%) 6.5 %        2.5-10.0                  

 

             EOSINOPH % (test code = EO%) 0.9 %        0.0-5.0                  

 

 

             BASOPHIL % (test code = BA%) 0.4 %        0.0-2.0                  

 

 

             IG % (test code = IG%) 0.7 %        0.0-0.8                   

 

             NRBC% (test code = NRBC%) 0.0 %        0.0-0.2                   

 

             MANDIFF (test code = MDIFF) NO           NO                        

 

             RBC MORPH (test code = RBCMOR)              NORMAL                 

   



URINALYSIS WITH [id number] 06:44:00





             Test Item    Value        Reference Range Interpretation Comments

 

             COLOR (test code = COLU) YELLOW       YELLOW                    

 

             CLARITY (test code = CLA) CLOUDY       CLEAR        A            

 

             GLUCOSE UR (test code = UA NEGATIVE     NEGATIVE                  



             GLUCOSE)                                            

 

             BILI UR (test code = BILE) NEGATIVE     NEGATIVE                  

 

             KETONES UR (test code = ACE) NEGATIVE     NEGATIVE                 

 

 

             SP GRAVITY (test code = SPGR) 1.014        1.005-1.030             

  

 

             PH UR (test code = PH) 6.0          4.5-8.0                   

 

             PROTEIN UR (test code = PU) TRACE        NEGATIVE     A            

 

             UROBIL UR (test code = UROQ) 0.2 EU/dL    0.2-1.0                  

 

 

             NITRITE UR (test code = NEGATIVE     NEGATIVE                  



             NITRITE)                                            

 

             BLOOD UR (test code = UA BLOOD) TRACE        NEGATIVE     A        

    

 

             LEUK ES UR (test code = LEUK) 2+           NEGATIVE     A          

  

 

             WBC UR (test code = UWBC) 12 /HPF      0-5          H            

 

             RBC UR (test code = URBC) 1 /HPF       0-2                       

 

             EPITH UR (test code = UEPC) FEW /LPF     FEW                       

 

             BACTERIA UR (test code = UBACT) MODERATE /HPF NONE         A       

     

 

             CAST UR (test code = CAST)  /LPF        NONE                      

 

             CRYSTAL UR (test code = CRYU)  / LPF       NONE                    

  

 

             MUCUS UR (test code = MUC)  / HPF       NONE                      

 

             AMORPH UR (test code = YASMEEN)  / HPF       NONE                     

 

 

             TRICH UR (test code = UTRICH)  /HPF        NONE                    

  

 

             YEAST UR (test code = UY)  /HPF        NONE                      

 

             SPERM UR (test code = USPERM)  /HPF        NONE                    

  



COMPREHENSIVE METABOLIC [id number] 05:24:00





             Test Item    Value        Reference Range Interpretation Comments

 

             GLUCOSE (test code = 06D) 105 mg/dL           H            

 

             SODIUM (test code = 01A) 138 mmol/L   136-145                   

 

             POTASSIUM (test code = 01B) 4.0 mmol/L   3.6-5.1                   

 

             CHLORIDE (test code = 04A) 104 mmol/L                       

 

             CO2 (test code = 02A) 25 mmol/L    22-32                     

 

             ANION GAP (test code = ANG) 13.0 mmol/L                            

 

             BUN (test code = 05D) 29 mg/dL     7-18         H            

 

             CREATININE (test code = 03E) 1.5 mg/dL    0.4-1.1      H           

 

 

             BUN/CREA (test code = BCR) 19           12-20                     

 

             CALCIUM (test code = 09D) 8.4 mg/dL    8.3-9.5                   

 

             BILI TOTAL (test code = 11A) 0.7 mg/dL    0.2-1.0                  

 

 

             PROTEIN (test code = 07D) 6.2 g/dL     6.4-8.2      L            

 

             ALBUMIN (test code = 08D) 3.1 g/dL     3.5-4.8      L            

 

             GLOBULIN (test code = GLB) 3.1 g/dL     1.5-3.8                   

 

             ALB/GLOB (test code = AGRR) 1.0          1.0-2.6                   

 

             ALK PHOS (test code = 35A) 104 IU/L                         

 

             AST (test code = 30A) 22 IU/L      <=42                      

 

             ALT (test code = 31A) 42 IU/L      <=78                      



CBC (INCLUDES AUTOMATED DIFFERENTIAL)2019 05:07:00





             Test Item    Value        Reference Range Interpretation Comments

 

             WBC (test code = WBC) 8.6 10\S\3/uL 4.5-11.0                  

 

             RBC (test code = RBC) 3.72 10\S\6/uL 4.20-5.60    L            

 

             HGB (test code = HBG) 10.8 g/dL    12.0-15.5    L            

 

             HCT (test code = HCT) 33.7 %       35.0-44.0    L            

 

             MCV (test code = MCV) 90.6 fL      81.0-99.0                 

 

             MCH (test code = MCH) 29.0 pg      27.0-31.0                 

 

             MCHC (test code = MCHC) 32.0 g/dL    32.0-36.0                 

 

             RDW (test code = RDW) 14.7 %       11.5-14.5    H            

 

             PLT (test code = PLT) 152 10\S\3/uL 130-400                   

 

             MPV (test code = MPV) 11.4 fL      9.4-12.4                  

 

             NEUTROP # (test code = NE#) 6.2 10\S\3/uL 1.6-8.0                  

 

 

             LYMPH # (test code = LY#) 1.6 10\S\3/uL 1.1-3.5                   

 

             MONOCYTE # (test code = MO#) 0.5 10\S\3/uL 0.0-1.1                 

  

 

             EOSINOPH # (test code = EO#) 0.2 10\S\3/uL 0.0-0.7                 

  

 

             BASOPHIL # (test code = BA#) 0.1 10\S\3/uL 0.0-0.3                 

  

 

             IG # (test code = IG#) 0.03 10\S\3/uL 0.00-0.06                 

 

             NRBC # (test code = NRBC#) 0.00 10\S\3/uL 0.00-0.01                

 

 

             NEUTROPH % (test code = NE%) 72.8 %       35.0-73.0                

 

 

             LYMPH % (test code = LY%) 18.5 %       20.0-55.0    L            

 

             MONO % (test code = MO%) 5.8 %        2.5-10.0                  

 

             EOSINOPH % (test code = EO%) 2.0 %        0.0-5.0                  

 

 

             BASOPHIL % (test code = BA%) 0.6 %        0.0-2.0                  

 

 

             IG % (test code = IG%) 0.3 %        0.0-0.8                   

 

             NRBC% (test code = NRBC%) 0.0 %        0.0-0.2                   

 

             MANDIFF (test code = MDIFF) NO           NO                        

 

             RBC MORPH (test code = RBCMOR)              NORMAL                 

   



U/S KIDNEY (RENAL)2019 23:20:42LOCATION: [id number]: 63-year-old female 
who presents with acute nontraumatic kidney injury.COMMENT:Sonographic imaging 
of this patient's retroperitoneum was performed.The right kidney measures 9.9 x 
5.9 x 6.4 cm with a 13 mm cortical thickness. Acyst is seen in the upper pole 
measuring 23 x 19 x 19 mm, and a second cyst isseen in the lower pole measuring 
18 x 16 x 16 mm.The left kidney measures 9.4 x 5.3 x 5.6 cm with a 11 mm 
cortical thickness. Nocysts or masses are seen in the left kidney.Cortical 
echotexture of the kidneys otherwise is unremarkable.There is no evidence of 
hydronephrosis of either kidney.In the urinary bladder only the left urine jet 
was observed on the color flowstudy. The bladder otherwise is 
unremarkable.IMPRESSION:Cystic changes are seen in the upper pole and lower pole
ofthis patient'sright kidney. No gross abnormalities are seen elsewhere in 
either kidney.The urinary bladder is unremarkable. Only the left urine jet was 
observed onthe color flow study.TROPONIN -02-19 07:14:00





             Test Item    Value        Reference Range Interpretation Comments

 

             TROPONIN I (test code = A84) <0.015 ng/mL 0.000-0.045              

 



COMPREHENSIVE METABOLIC [id number] 05:28:00





             Test Item    Value        Reference Range Interpretation Comments

 

             GLUCOSE (test code = 06D) 110 mg/dL           H            

 

             SODIUM (test code = 01A) 138 mmol/L   136-145                   

 

             POTASSIUM (test code = 01B) 3.9 mmol/L   3.6-5.1                   

 

             CHLORIDE (test code = 04A) 106 mmol/L                       

 

             CO2 (test code = 02A) 24 mmol/L    22-32                     

 

             ANION GAP (test code = ANG) 11.9 mmol/L                            

 

             BUN (test code = 05D) 22 mg/dL     7-18         H            

 

             CREATININE (test code = 03E) 1.5 mg/dL    0.4-1.1      H           

 

 

             BUN/CREA (test code = BCR) 15           12-20                     

 

             CALCIUM (test code = 09D) 8.2 mg/dL    8.3-9.5      L            

 

             BILI TOTAL (test code = 11A) 0.6 mg/dL    0.2-1.0                  

 

 

             PROTEIN (test code = 07D) 6.0 g/dL     6.4-8.2      L            

 

             ALBUMIN (test code = 08D) 2.9 g/dL     3.5-4.8      L            

 

             GLOBULIN (test code = GLB) 3.1 g/dL     1.5-3.8                   

 

             ALB/GLOB (test code = AGRR) 0.9          1.0-2.6      L            

 

             ALK PHOS (test code = 35A) 96 IU/L                          

 

             AST (test code = 30A) 19 IU/L      <=42                      

 

             ALT (test code = 31A) 35 IU/L      <=78                      



CBC (INCLUDES AUTOMATED DIFFERENTIAL)2019 05:14:00





             Test Item    Value        Reference Range Interpretation Comments

 

             WBC (test code = WBC) 7.0 10\S\3/uL 4.5-11.0                  

 

             RBC (test code = RBC) 3.64 10\S\6/uL 4.20-5.60    L            

 

             HGB (test code = HBG) 10.6 g/dL    12.0-15.5    L            

 

             HCT (test code = HCT) 33.5 %       35.0-44.0    L            

 

             MCV (test code = MCV) 92.0 fL      81.0-99.0                 

 

             MCH (test code = MCH) 29.1 pg      27.0-31.0                 

 

             MCHC (test code = MCHC) 31.6 g/dL    32.0-36.0    L            

 

             RDW (test code = RDW) 14.9 %       11.5-14.5    H            

 

             PLT (test code = PLT) 145 10\S\3/uL 130-400                   

 

             MPV (test code = MPV) 11.4 fL      9.4-12.4                  

 

             NEUTROP # (test code = NE#) 4.2 10\S\3/uL 1.6-8.0                  

 

 

             LYMPH # (test code = LY#) 2.1 10\S\3/uL 1.1-3.5                   

 

             MONOCYTE # (test code = MO#) 0.5 10\S\3/uL 0.0-1.1                 

  

 

             EOSINOPH # (test code = EO#) 0.1 10\S\3/uL 0.0-0.7                 

  

 

             BASOPHIL # (test code = BA#) 0.0 10\S\3/uL 0.0-0.3                 

  

 

             IG # (test code = IG#) 0.04 10\S\3/uL 0.00-0.06                 

 

             NRBC # (test code = NRBC#) 0.00 10\S\3/uL 0.00-0.01                

 

 

             NEUTROPH % (test code = NE%) 59.7 %       35.0-73.0                

 

 

             LYMPH % (test code = LY%) 30.1 %       20.0-55.0                 

 

             MONO % (test code = MO%) 7.0 %        2.5-10.0                  

 

             EOSINOPH % (test code = EO%) 2.0 %        0.0-5.0                  

 

 

             BASOPHIL % (test code = BA%) 0.6 %        0.0-2.0                  

 

 

             IG % (test code = IG%) 0.6 %        0.0-0.8                   

 

             NRBC% (test code = NRBC%) 0.0 %        0.0-0.2                   

 

             MANDIFF (test code = MDIFF) NO           NO                        

 

             RBC MORPH (test code = RBCMOR)              NORMAL                 

   



COMPREHENSIVE METABOLIC PAN2019-08-15 04:58:00





             Test Item    Value        Reference Range Interpretation Comments

 

             GLUCOSE (test code = 06D) 112 mg/dL           H            

 

             SODIUM (test code = 01A) 143 mmol/L   136-145                   

 

             POTASSIUM (test code = 01B) 4.4 mmol/L   3.6-5.1                   

 

             CHLORIDE (test code = 04A) 108 mmol/L          H            

 

             CO2 (test code = 02A) 27 mmol/L    22-32                     

 

             ANION GAP (test code = ANG) 12.4 mmol/L                            

 

             BUN (test code = 05D) 19 mg/dL     7-18         H            

 

             CREATININE (test code = 03E) 1.4 mg/dL    0.4-1.1      H           

 

 

             BUN/CREA (test code = BCR) 14           12-20                     

 

             CALCIUM (test code = 09D) 8.5 mg/dL    8.3-9.5                   

 

             BILI TOTAL (test code = 11A) 0.9 mg/dL    0.2-1.0                  

 

 

             PROTEIN (test code = 07D) 6.2 g/dL     6.4-8.2      L            

 

             ALBUMIN (test code = 08D) 2.9 g/dL     3.5-4.8      L            

 

             GLOBULIN (test code = GLB) 3.3 g/dL     1.5-3.8                   

 

             ALB/GLOB (test code = AGRR) 0.9          1.0-2.6      L            

 

             ALK PHOS (test code = 35A) 95 IU/L                          

 

             AST (test code = 30A) 19 IU/L      <=42                      

 

             ALT (test code = 31A) 40 IU/L      <=78                      



CBC (INCLUDES AUTOMATED DIFFERENTIAL)2019-08-15 04:51:00





             Test Item    Value        Reference Range Interpretation Comments

 

             WBC (test code = WBC) 8.2 10\S\3/uL 4.5-11.0                  

 

             RBC (test code = RBC) 3.48 10\S\6/uL 4.20-5.60    L            

 

             HGB (test code = HBG) 10.3 g/dL    12.0-15.5    L            

 

             HCT (test code = HCT) 32.4 %       35.0-44.0    L            

 

             MCV (test code = MCV) 93.1 fL      81.0-99.0                 

 

             MCH (test code = MCH) 29.6 pg      27.0-31.0                 

 

             MCHC (test code = MCHC) 31.8 g/dL    32.0-36.0    L            

 

             RDW (test code = RDW) 15.5 %       11.5-14.5    H            

 

             PLT (test code = PLT) 163 10\S\3/uL 130-400                   

 

             MPV (test code = MPV) 11.8 fL      9.4-12.4                  

 

             NEUTROP # (test code = NE#) 5.3 10\S\3/uL 1.6-8.0                  

 

 

             LYMPH # (test code = LY#) 2.0 10\S\3/uL 1.1-3.5                   

 

             MONOCYTE # (test code = MO#) 0.6 10\S\3/uL 0.0-1.1                 

  

 

             EOSINOPH # (test code = EO#) 0.2 10\S\3/uL 0.0-0.7                 

  

 

             BASOPHIL # (test code = BA#) 0.0 10\S\3/uL 0.0-0.3                 

  

 

             IG # (test code = IG#) 0.03 10\S\3/uL 0.00-0.06                 

 

             NRBC # (test code = NRBC#) 0.00 10\S\3/uL 0.00-0.01                

 

 

             NEUTROPH % (test code = NE%) 65.5 %       35.0-73.0                

 

 

             LYMPH % (test code = LY%) 24.2 %       20.0-55.0                 

 

             MONO % (test code = MO%) 7.2 %        2.5-10.0                  

 

             EOSINOPH % (test code = EO%) 2.2 %        0.0-5.0                  

 

 

             BASOPHIL % (test code = BA%) 0.5 %        0.0-2.0                  

 

 

             IG % (test code = IG%) 0.4 %        0.0-0.8                   

 

             NRBC% (test code = NRBC%) 0.0 %        0.0-0.2                   

 

             MANDIFF (test code = MDIFF) NO           NO                        



CARDIAC [id number] 23:10:00





             Test Item    Value        Reference Range Interpretation Comments

 

             TROPONIN I (test code = A84) <0.015 ng/mL 0.000-0.045              

 



U/S VENOUS DOPPLER IVON LOW [id number] 22:23:14LOCATION: [id number]: 
63-year-old female who presents with bilateral leg swelling.COMMENT: Sonographic
imaging of the venous anatomy in both of this patient's legs wasobtained 
utilizing grayscale, color-flow, and Doppler waveform imagingmodalities.The 
common femoral veins, superficial femoral veins, popliteal veins, 
posteriortibial veins, anterior tibial veins, and peroneal veins in both legs 
wereincluded in the study.The anatomy exhibits normal compressibility on 
grayscale study. No suspiciousfilling defects are seen on the color flow study. 
Appropriate waveform responseas are noted on the Dopplerstudy during 
augmentation maneuvers and duringquiet respiration. IMPRESSION:There is no 
sonographic evidence of venous thrombosis in either of thispatient's legs.
CARDIAC [id number] 16:50:00





             Test Item    Value        Reference Range Interpretation Comments

 

             TROPONIN I (test code = A84) <0.015 ng/mL 0.000-0.045              

 



BRAIN NATRIURETIC [id number] 14:39:00





             Test Item    Value        Reference Range Interpretation Comments

 

             proBNP (test code = PBNP) 1337 pg/mL   0-125        H            



THYROID PANEL/SCREEN (TSH)2019 12:05:00





             Test Item    Value        Reference Range Interpretation Comments

 

             TSH (test code = A57) 0.989 uIU/mL 0.358-3.740               



[id number] 12:02:00





             Test Item    Value        Reference Range Interpretation Comments

 

             CPK (test code = 32A) 98 IU/L                          



AMYLASE AND [id number] 12:02:00





             Test Item    Value        Reference Range Interpretation Comments

 

             AMYLASE (test code = 10A) 19 U/L              L            

 

             LIPASE (test code = 60A) 67 IU/L             L            



COMPREHENSIVE METABOLIC [id number] 12:02:00





             Test Item    Value        Reference Range Interpretation Comments

 

             GLUCOSE (test code = 06D) 109 mg/dL           H            

 

             SODIUM (test code = 01A) 142 mmol/L   136-145                   

 

             POTASSIUM (test code = 01B) 4.2 mmol/L   3.6-5.1                   

 

             CHLORIDE (test code = 04A) 109 mmol/L          H            

 

             CO2 (test code = 02A) 26 mmol/L    22-32                     

 

             ANION GAP (test code = ANG) 11.2 mmol/L                            

 

             BUN (test code = 05D) 16 mg/dL     7-18                      

 

             CREATININE (test code = 03E) 1.3 mg/dL    0.4-1.1      H           

 

 

             BUN/CREA (test code = BCR) 13           12-20                     

 

             CALCIUM (test code = 09D) 8.7 mg/dL    8.3-9.5                   

 

             BILI TOTAL (test code = 11A) 1.3 mg/dL    0.2-1.0      H           

 

 

             PROTEIN (test code = 07D) 6.5 g/dL     6.4-8.2                   

 

             ALBUMIN (test code = 08D) 3.2 g/dL     3.5-4.8      L            

 

             GLOBULIN (test code = GLB) 3.3 g/dL     1.5-3.8                   

 

             ALB/GLOB (test code = AGRR) 1.0          1.0-2.6                   

 

             ALK PHOS (test code = 35A) 101 IU/L                         

 

             AST (test code = 30A) 21 IU/L      <=42                      

 

             ALT (test code = 31A) 41 IU/L      <=78                      



TROPONIN -36-41 11:57:00





             Test Item    Value        Reference Range Interpretation Comments

 

             TROPONIN I (test code = A84) <0.015 ng/mL 0.000-0.045              

 



XR CHEST 1 VIEW [id number] 11:55:22EXAM: XR CHEST 1 VIEW 
PORTABLE.LOCATION: D4.HISTORY: [id number]: Chest pain.COMPARISON: Radiograph dated
2019.TECHNIQUE: Single AP view of the chest was obtained. FINDINGS:The 
heart is enlarged in size. Small left pleural effusion and left basilaropacities
are present. There is elevation of the right hemidiaphragm. No acuteosseous 
abnormality is identified.IMPRESSION:Small left pleural effusion with left 
basilar opacities, which may representatelectasis or infiltrates.Cardiomegaly.
PRO TIME AND [id number] 11:43:00





             Test Item    Value        Reference Range Interpretation Comments

 

             PT (test code = 13.4 s       9.8-13.6                  



             TT)                                                 

 

             INR (test code = 1.2                                    



             INR)                                                

 

             INRH (test code =  **** SUGGESTED THERAPEUTIC                      

     



             INRH)        RANGE FOR INR: **** 2.5 -                           



                          3.5 ** For Patients with                           



                          Prosthetic Valves or                           



                          Patients with  recurrent                           



                          Thromboembolic Events **                           



                          2.0 - 3.0 ** For Most Other                           



                          Applications **                           

 

             PTT (test code = 30.4 s       20.2-38.0                 



             PTT)                                                

 

             PTTH (test code = **** To monitor the                           



             PTTH)        effectiveness of heparin,                           



                          we offer the Anti-Xa                           



                          (Heparin Assay). It can be                           



                          used for either                           



                          unfractionated or LMW                           



                          Heparin. Order Code is                           



                          ANTI-XA ****                           



CBC (INCLUDES AUTOMATED DIFFERENTIAL)2019 11:36:00





             Test Item    Value        Reference Range Interpretation Comments

 

             WBC (test code = WBC) 7.6 10\S\3/uL 4.5-11.0                  

 

             RBC (test code = RBC) 3.53 10\S\6/uL 4.20-5.60    L            

 

             HGB (test code = HBG) 10.3 g/dL    12.0-15.5    L            

 

             HCT (test code = HCT) 33.5 %       35.0-44.0    L            

 

             MCV (test code = MCV) 94.9 fL      81.0-99.0                 

 

             MCH (test code = MCH) 29.2 pg      27.0-31.0                 

 

             MCHC (test code = MCHC) 30.7 g/dL    32.0-36.0    L            

 

             RDW (test code = RDW) 15.4 %       11.5-14.5    H            

 

             PLT (test code = PLT) 164 10\S\3/uL 130-400                   

 

             MPV (test code = MPV) 11.7 fL      9.4-12.4                  

 

             NEUTROP # (test code = NE#) 5.6 10\S\3/uL 1.6-8.0                  

 

 

             LYMPH # (test code = LY#) 1.4 10\S\3/uL 1.1-3.5                   

 

             MONOCYTE # (test code = MO#) 0.4 10\S\3/uL 0.0-1.1                 

  

 

             EOSINOPH # (test code = EO#) 0.1 10\S\3/uL 0.0-0.7                 

  

 

             BASOPHIL # (test code = BA#) 0.0 10\S\3/uL 0.0-0.3                 

  

 

             IG # (test code = IG#) 0.04 10\S\3/uL 0.00-0.06                 

 

             NRBC # (test code = NRBC#) 0.00 10\S\3/uL 0.00-0.01                

 

 

             NEUTROPH % (test code = NE%) 73.9 %       35.0-73.0    H           

 

 

             LYMPH % (test code = LY%) 18.0 %       20.0-55.0    L            

 

             MONO % (test code = MO%) 5.7 %        2.5-10.0                  

 

             EOSINOPH % (test code = EO%) 1.6 %        0.0-5.0                  

 

 

             BASOPHIL % (test code = BA%) 0.3 %        0.0-2.0                  

 

 

             IG % (test code = IG%) 0.5 %        0.0-0.8                   

 

             NRBC% (test code = NRBC%) 0.0 %        0.0-0.2                   

 

             MANDIFF (test code = MDIFF) NO           NO                        

 

             RBC MORPH (test code = RBCMOR)              NORMAL                 

   



CBC WITH [id number] 15:29:00





             Test Item    Value        Reference Range Interpretation Comments

 

             WBC (test code = WBC) 9.7 10\S\3/uL 4.5-11.0                  

 

             RBC (test code = RBC) 3.73 10\S\6/uL 4.20-5.60    L            

 

             HGB (test code = HBG) 11.0 g/dL    12.0-15.5    L            

 

             HCT (test code = HCT) 34.1 %       35.0-44.0    L            

 

             MCV (test code = MCV) 91.4 fL      81.0-99.0                 

 

             MCH (test code = MCH) 29.5 pg      27.0-31.0                 

 

             MCHC (test code = MCHC) 32.3 g/dL    32.0-36.0                 

 

             RDW (test code = RDW) 14.2 %       11.5-14.5                 

 

             PLT (test code = PLT) 116 10\S\3/uL 130-400      L            

 

             MPV (test code = MPV) 11.8 fL      9.4-12.4                  

 

             NEUTROP # (test code = NE#) 7.9 10\S\3/uL 1.6-8.0                  

 

 

             LYMPH # (test code = LY#) 1.0 10\S\3/uL 1.1-3.5      L            

 

             MONOCYTE # (test code = 0.6 10\S\3/uL 0.0-1.1                   



             MO#)                                                

 

             EOSINOPH # (test code = 0.1 10\S\3/uL 0.0-0.7                   



             EO#)                                                

 

             BASOPHIL # (test code = 0.0 10\S\3/uL 0.0-0.3                   



             BA#)                                                

 

             IG # (test code = IG#) 0.04 10\S\3/uL 0.00-0.06                 

 

             NRBC # (test code = NRBC#) 0.00 10\S\3/uL 0.00-0.01                

 

 

             NEUTROPH % (test code = 81.6 %       35.0-73.0    H            



             NE%)                                                

 

             LYMPH % (test code = LY%) 10.3 %       20.0-55.0    L            

 

             MONO % (test code = MO%) 6.4 %        2.5-10.0                  

 

             EOSINOPH % (test code = 1.0 %        0.0-5.0                   



             EO%)                                                

 

             BASOPHIL % (test code = 0.3 %        0.0-2.0                   



             BA%)                                                

 

             IG % (test code = IG%) 0.4 %        0.0-0.8                   

 

             NRBC% (test code = NRBC%) 0.0 %        0.0-0.2                   

 

             PLT EST (test code = ADEQUATE     ADEQUATE                  



             PLTEST)                                             

 

             PLT MORPH (test code = NORMAL (1.5-3 um) NORMAL                    



             PLTMOR)                                             



BASIC METABOLIC [id number] 15:26:00





             Test Item    Value        Reference Range Interpretation Comments

 

             GLUCOSE (test code = 06D) 118 mg/dL           H            

 

             SODIUM (test code = 01A) 136 mmol/L   136-145                   

 

             POTASSIUM (test code = 01B) 4.2 mmol/L   3.6-5.1                   

 

             CHLORIDE (test code = 04A) 106 mmol/L                       

 

             CO2 (test code = 02A) 24 mmol/L    22-32                     

 

             ANION GAP (test code = ANG) 10.2 mmol/L                            

 

             BUN (test code = 05D) 38 mg/dL     7-18         H            

 

             CREATININE (test code = 03E) 1.6 mg/dL    0.4-1.1      H           

 

 

             BUN/CREA (test code = BCR) 23           12-20        H            

 

             CALCIUM (test code = 09D) 9.2 mg/dL    8.3-9.5                   



CARDIAC [id number] 06:04:00





             Test Item    Value        Reference Range Interpretation Comments

 

             TROPONIN I (test code = A84) <0.015 ng/mL 0.000-0.045              

 



BASIC METABOLIC [id number] 05:52:00





             Test Item    Value        Reference Range Interpretation Comments

 

             GLUCOSE (test code = 06D) 171 mg/dL           H            

 

             SODIUM (test code = 01A) 138 mmol/L   136-145                   

 

             POTASSIUM (test code = 01B) 4.0 mmol/L   3.6-5.1                   

 

             CHLORIDE (test code = 04A) 107 mmol/L                       

 

             CO2 (test code = 02A) 23 mmol/L    22-32                     

 

             ANION GAP (test code = ANG) 12.0 mmol/L                            

 

             BUN (test code = 05D) 19 mg/dL     7-18         H            

 

             CREATININE (test code = 03E) 1.2 mg/dL    0.4-1.1      H           

 

 

             BUN/CREA (test code = BCR) 15           12-20                     

 

             CALCIUM (test code = 09D) 8.5 mg/dL    8.3-9.5                   



CBC (INCLUDES AUTOMATED DIFFERENTIAL)2019 05:30:00





             Test Item    Value        Reference Range Interpretation Comments

 

             WBC (test code = WBC) 14.0 10\S\3/uL 4.5-11.0     H            

 

             RBC (test code = RBC) 3.64 10\S\6/uL 4.20-5.60    L            

 

             HGB (test code = HBG) 10.8 g/dL    12.0-15.5    L            

 

             HCT (test code = HCT) 33.2 %       35.0-44.0    L            

 

             MCV (test code = MCV) 91.2 fL      81.0-99.0                 

 

             MCH (test code = MCH) 29.7 pg      27.0-31.0                 

 

             MCHC (test code = MCHC) 32.5 g/dL    32.0-36.0                 

 

             RDW (test code = RDW) 14.0 %       11.5-14.5                 

 

             PLT (test code = PLT) 143 10\S\3/uL 130-400                   

 

             MPV (test code = MPV) 11.4 fL      9.4-12.4                  

 

             NEUTROP # (test code = NE#) 12.1 10\S\3/uL 1.6-8.0      H          

  

 

             LYMPH # (test code = LY#) 0.9 10\S\3/uL 1.1-3.5      L            

 

             MONOCYTE # (test code = MO#) 0.9 10\S\3/uL 0.0-1.1                 

  

 

             EOSINOPH # (test code = EO#) 0.0 10\S\3/uL 0.0-0.7                 

  

 

             BASOPHIL # (test code = BA#) 0.0 10\S\3/uL 0.0-0.3                 

  

 

             IG # (test code = IG#) 0.10 10\S\3/uL 0.00-0.06    H            

 

             NRBC # (test code = NRBC#) 0.00 10\S\3/uL 0.00-0.01                

 

 

             NEUTROPH % (test code = NE%) 86.2 %       35.0-73.0    H           

 

 

             LYMPH % (test code = LY%) 6.3 %        20.0-55.0    L            

 

             MONO % (test code = MO%) 6.6 %        2.5-10.0                  

 

             EOSINOPH % (test code = EO%) 0.0 %        0.0-5.0                  

 

 

             BASOPHIL % (test code = BA%) 0.2 %        0.0-2.0                  

 

 

             IG % (test code = IG%) 0.7 %        0.0-0.8                   

 

             NRBC% (test code = NRBC%) 0.0 %        0.0-0.2                   

 

             MANDIFF (test code = MDIFF) NO           NO                        

 

             RBC MORPH (test code = RBCMOR)              NORMAL                 

   



CARDIAC [id number] 23:08:00





             Test Item    Value        Reference Range Interpretation Comments

 

             TROPONIN I (test code = A84) <0.015 ng/mL 0.000-0.045              

 



CT DISSECTION [id number] 19:26:35Exam: CT thorax PE protocol.Location: H
12History: [id number]: Chest painTechnique: Enhanced spiral slices were taken from
the apices of the lungs,through the upper abdomen utilizing a pulmonary embolism
protocol. Multiplanarreformations were performed. 100cc of Omnipaque were used. 
One or more of thefollowing radiation dose reduction techniques was used: 
automated exposurecontrol, adjustment of mA and/or KV according to patient size,
and/orutilization of iterative reconstruction technique.Findings:Nofilling 
defects are seen in the main pulmonary arteries. The pulmonaryvasculature is 
normal. No pulmonary venous congestion or arterial hypertensionis seen.The lungs
are clear. No infiltration or effusion is seen. No mass or nodule isseen.The 
mediastinum and the pulmonary kandis are normal. Calcified granulomas arenoted. No
lymphadenopathy is present. The heart size is normal.No pericardialeffusion is s
een.The visualized upper abdominal organs are unremarkable.Impression:1. 
Negative for pulmonary embolism.2. No acute disease.THYROID PANEL/SCREEN (TSH)
2019 18:29:00





             Test Item    Value        Reference Range Interpretation Comments

 

             TSH (test code = A57) 0.706 uIU/mL 0.358-3.740               



LIVER [id number] 18:28:00





             Test Item    Value        Reference Range Interpretation Comments

 

             BILI TOTAL (test code = 11A) 0.9 mg/dL    0.2-1.0                  

 

 

             BILI DIRCT (test code = 12A) 0.2 mg/dL    0.0-0.2                  

 

 

             BILI INDIR (test code = BILII) 0.7 mg/dL    <=0.8                  

   

 

             PROTEIN (test code = 07D) 7.7 g/dL     6.4-8.2                   

 

             ALBUMIN (test code = 08D) 3.8 g/dL     3.5-4.8                   

 

             GLOBULIN (test code = GLB) 3.9 g/dL     1.5-3.8      H            

 

             ALB/GLOB (test code = AGRR) 1.0          1.0-2.6                   

 

             ALK PHOS (test code = 35A) 106 IU/L                         

 

             AST (test code = 30A) 25 IU/L      <=42                      

 

             ALT (test code = 31A) 21 IU/L      <=78                      



BRAIN NATRIURETIC [id number] 18:19:00





             Test Item    Value        Reference Range Interpretation Comments

 

             proBNP (test code = PBNP) 518 pg/mL    0-125        H            



[id number] 18:15:00





             Test Item    Value        Reference Range Interpretation Comments

 

             MAGNESIUM (test code = 48A) 1.9 mg/dL    1.8-2.4                   



[id number] 17:40:00





             Test Item    Value        Reference Range Interpretation Comments

 

             D-DIMER (test code = <200 ng/mL D-DU 0-234                     



             DDI)                                                

 

             D-DIMER COMMENT (test *Level to rule out                           



             code = DDCOM) DVT or PE: <235 ng/mL                           



                          D-DU*                                  



CARDIAC [id number] 17:31:00





             Test Item    Value        Reference Range Interpretation Comments

 

             TROPONIN I (test code = A84) <0.015 ng/mL 0.000-0.045              

 



BASIC METABOLIC [id number] 17:27:00





             Test Item    Value        Reference Range Interpretation Comments

 

             GLUCOSE (test code = 06D) 114 mg/dL           H            

 

             SODIUM (test code = 01A) 142 mmol/L   136-145                   

 

             POTASSIUM (test code = 01B) 4.0 mmol/L   3.6-5.1                   

 

             CHLORIDE (test code = 04A) 111 mmol/L          H            

 

             CO2 (test code = 02A) 26 mmol/L    22-32                     

 

             ANION GAP (test code = ANG) 9.0 mmol/L                             

 

             BUN (test code = 05D) 19 mg/dL     7-18         H            

 

             CREATININE (test code = 03E) 1.2 mg/dL    0.4-1.1      H           

 

 

             BUN/CREA (test code = BCR) 15           12-20                     

 

             CALCIUM (test code = 09D) 9.2 mg/dL    8.3-9.5                   



CBC (INCLUDES AUTOMATED DIFFERENTIAL)2019 17:26:00





             Test Item    Value        Reference Range Interpretation Comments

 

             WBC (test code = WBC) 12.6 10\S\3/uL 4.5-11.0     H            

 

             RBC (test code = RBC) 4.04 10\S\6/uL 4.20-5.60    L            

 

             HGB (test code = HBG) 11.9 g/dL    12.0-15.5    L            

 

             HCT (test code = HCT) 37.6 %       35.0-44.0                 

 

             MCV (test code = MCV) 93.1 fL      81.0-99.0                 

 

             MCH (test code = MCH) 29.5 pg      27.0-31.0                 

 

             MCHC (test code = MCHC) 31.6 g/dL    32.0-36.0    L            

 

             RDW (test code = RDW) 13.8 %       11.5-14.5                 

 

             PLT (test code = PLT) 152 10\S\3/uL 130-400                   

 

             MPV (test code = MPV) 11.3 fL      9.4-12.4                  

 

             NEUTROP # (test code = NE#) 10.7 10\S\3/uL 1.6-8.0      H          

  

 

             LYMPH # (test code = LY#) 1.1 10\S\3/uL 1.1-3.5                   

 

             MONOCYTE # (test code = MO#) 0.6 10\S\3/uL 0.0-1.1                 

  

 

             EOSINOPH # (test code = EO#) 0.1 10\S\3/uL 0.0-0.7                 

  

 

             BASOPHIL # (test code = BA#) 0.0 10\S\3/uL 0.0-0.3                 

  

 

             IG # (test code = IG#) 0.06 10\S\3/uL 0.00-0.06                 

 

             NRBC # (test code = NRBC#) 0.00 10\S\3/uL 0.00-0.01                

 

 

             NEUTROPH % (test code = NE%) 85.2 %       35.0-73.0    H           

 

 

             LYMPH % (test code = LY%) 8.4 %        20.0-55.0    L            

 

             MONO % (test code = MO%) 5.0 %        2.5-10.0                  

 

             EOSINOPH % (test code = EO%) 0.6 %        0.0-5.0                  

 

 

             BASOPHIL % (test code = BA%) 0.3 %        0.0-2.0                  

 

 

             IG % (test code = IG%) 0.5 %        0.0-0.8                   

 

             NRBC% (test code = NRBC%) 0.0 %        0.0-0.2                   

 

             MANDIFF (test code = MDIFF) NO           NO                        



PTT (PARTIAL THROMBOPLASTIN TIME)2019 17:26:00





             Test Item    Value        Reference Range Interpretation Comments

 

             PTT (test code = 31.9 s       20.2-38.0                 



             PTT)                                                

 

             PTTH (test code = **** To monitor the                           



             PTTH)        effectiveness of heparin,                           



                          we offer the Anti-Xa                           



                          (Heparin Assay). It can be                           



                          used for either                           



                          unfractionated or LMW                           



                          Heparin. Order Code is                           



                          ANTI-XA ****                           



PROTHROMBIN [id number] 17:26:00





             Test Item    Value        Reference Range Interpretation Comments

 

             PT (test code = 12.0 s       9.8-13.6                  



             TT)                                                 

 

             INR (test code = 1.1                                    



             INR)                                                

 

             INRH (test code =  **** SUGGESTED THERAPEUTIC                      

     



             INRH)        RANGE FOR INR: **** 2.5 -                           



                          3.5 ** For Patients with                           



                          Prosthetic Valves or                           



                          Patients with recurrent                           



                          Thromboembolic Events **                           



                          2.0 - 3.0 ** For Most Other                           



                          Applications **                           



XR CHEST 1 VIEW [id number] 17:04:28Portable AP chest, 1 viewLocation 
Code: [id number] HISTORY: Chest painCOMPARISON: NoneCOMMENT: Mild atelectasis is
present within the lung bases. The costophrenic angles aresharp. The 
cardiomediastinalsilhouette is unremarkable. The bones are intact.IMPRESSION: 
Mild bibasilar atelectasis. Otherwise, no acute abnormalityCHEM [id number] 
16:51:00





             Test Item    Value        Reference Range Interpretation Comments

 

             Creatinine Lvl (test code = Creatinine 1.15         0.50-1.40      

           



             Lvl)                                                



[facility]2017-01-24 16:51:00





             Test Item    Value        Reference Range Interpretation Comments

 

             BUN (test code = BUN) 16           7-22                      



[facility]2017-01-24 16:51:00





             Test Item    Value        Reference Range Interpretation Comments

 

             Chloride Lvl (test code = Chloride Lvl) 109                  

            



[facility]2017-01-24 16:51:00





             Test Item    Value        Reference Range Interpretation Comments

 

             Potassium Lvl (test code = Potassium 4.3          3.5-5.1          

         



             Lvl)                                                



[facility]2017-01-24 16:51:00





             Test Item    Value        Reference Range Interpretation Comments

 

             Sodium Lvl (test code = Sodium Lvl) 144          135-145           

        



[facility]2017-01-24 16:51:00





             Test Item    Value        Reference Range Interpretation Comments

 

             CO2 (test code = CO2)                      



[facility]2017-01-24 16:51:00





             Test Item    Value        Reference Range Interpretation Comments

 

             Calcium Lvl (test code = Calcium Lvl) 8.4          8.5-10.5        

          



[facility]2017-01-24 16:51:00





             Test Item    Value        Reference Range Interpretation Comments

 

             AGAP (test code = AGAP) 11.3         10.0-20.0                 



[facility]2017-01-24 16:51:00





             Test Item    Value        Reference Range Interpretation Comments

 

             Glucose Lvl (test code = Glucose Lvl) 109          70-99           

          



[facility]2017-01-24 16:51:00





             Test Item    Value        Reference Range Interpretation Comments

 

             eGFR (test code = eGFR) 52                                     



[facility]2017-01-24 16:51:00





             Test Item    Value        Reference Range Interpretation Comments

 

             Creatinine Lvl (test code = Creatinine 1.15         0.50-1.40      

           



             Lvl)                                                



[facility]2017-01-24 16:51:00





             Test Item    Value        Reference Range Interpretation Comments

 

             BUN (test code = BUN) 16                                 



[facility]2017-01-24 16:51:00





             Test Item    Value        Reference Range Interpretation Comments

 

             Chloride Lvl (test code = Chloride Lvl) 109                  

            



[facility]2017-01-24 16:51:00





             Test Item    Value        Reference Range Interpretation Comments

 

             Potassium Lvl (test code = Potassium 4.3          3.5-5.1          

         



             Lvl)                                                



[facility]2017-01-24 16:51:00





             Test Item    Value        Reference Range Interpretation Comments

 

             Sodium Lvl (test code = Sodium Lvl) 144          135-145           

        



[facility]2017-01-24 16:51:00





             Test Item    Value        Reference Range Interpretation Comments

 

             CO2 (test code = CO2)                      



[facility]2017-01-24 16:51:00





             Test Item    Value        Reference Range Interpretation Comments

 

             Calcium Lvl (test code = Calcium Lvl) 8.4          8.5-10.5        

          



[facility]2017-01-24 16:51:00





             Test Item    Value        Reference Range Interpretation Comments

 

             AGAP (test code = AGAP) 11.3         10.0-20.0                 



[facility]2017-01-24 16:51:00





             Test Item    Value        Reference Range Interpretation Comments

 

             Glucose Lvl (test code = Glucose Lvl) 109          70-99           

          



[facility]2017-01-24 16:51:00





             Test Item    Value        Reference Range Interpretation Comments

 

             eGFR (test code = eGFR) 52                                     



[facility]2017-01-24 16:51:00





             Test Item    Value        Reference Range Interpretation Comments

 

             Creatinine Lvl (test code = Creatinine 1.15         0.50-1.40      

           



             Lvl)                                                



[facility]2017-01-24 16:51:00





             Test Item    Value        Reference Range Interpretation Comments

 

             BUN (test code = BUN) 16           -                      



[facility]2017-01-24 16:51:00





             Test Item    Value        Reference Range Interpretation Comments

 

             Chloride Lvl (test code = Chloride Lvl) 109                  

            



[facility]2017-01-24 16:51:00





             Test Item    Value        Reference Range Interpretation Comments

 

             Potassium Lvl (test code = Potassium 4.3          3.5-5.1          

         



             Lvl)                                                



[facility]2017-01-24 16:51:00





             Test Item    Value        Reference Range Interpretation Comments

 

             Sodium Lvl (test code = Sodium Lvl) 144          135-145           

        



[facility]2017-01-24 16:51:00





             Test Item    Value        Reference Range Interpretation Comments

 

             CO2 (test code = CO2) 28           24-32                     



[facility]2017-01-24 16:51:00





             Test Item    Value        Reference Range Interpretation Comments

 

             Calcium Lvl (test code = Calcium Lvl) 8.4          8.5-10.5        

          



[facility]2017-01-24 16:51:00





             Test Item    Value        Reference Range Interpretation Comments

 

             AGAP (test code = AGAP) 11.3         10.0-20.0                 



[facility]2017-01-24 16:51:00





             Test Item    Value        Reference Range Interpretation Comments

 

             Glucose Lvl (test code = Glucose Lvl) 109          70-99           

          



[facility]2017-01-24 16:51:00





             Test Item    Value        Reference Range Interpretation Comments

 

             eGFR (test code = eGFR) 52                                     



[facility]2017-01-24 16:51:00





             Test Item    Value        Reference Range Interpretation Comments

 

             Creatinine Lvl (test code = Creatinine 1.15         0.50-1.40      

           



             Lvl)                                                



[facility]2017-01-24 16:51:00





             Test Item    Value        Reference Range Interpretation Comments

 

             BUN (test code = BUN) 16           7-22                      



[facility]2017-01-24 16:51:00





             Test Item    Value        Reference Range Interpretation Comments

 

             Chloride Lvl (test code = Chloride Lvl) 109                  

            



[facility]2017-01-24 16:51:00





             Test Item    Value        Reference Range Interpretation Comments

 

             Potassium Lvl (test code = Potassium 4.3          3.5-5.1          

         



             Lvl)                                                



[facility]2017-01-24 16:51:00





             Test Item    Value        Reference Range Interpretation Comments

 

             Sodium Lvl (test code = Sodium Lvl) 144          135-145           

        



[facility]2017-01-24 16:51:00





             Test Item    Value        Reference Range Interpretation Comments

 

             CO2 (test code = CO2) 28           24-32                     



[facility]2017-01-24 16:51:00





             Test Item    Value        Reference Range Interpretation Comments

 

             Calcium Lvl (test code = Calcium Lvl) 8.4          8.5-10.5        

          



[facility]2017-01-24 16:51:00





             Test Item    Value        Reference Range Interpretation Comments

 

             AGAP (test code = AGAP) 11.3         10.0-20.0                 



[facility]2017-01-24 16:51:00





             Test Item    Value        Reference Range Interpretation Comments

 

             Glucose Lvl (test code = Glucose Lvl) 109          70-99           

          



[facility]2017-01-24 16:51:00





             Test Item    Value        Reference Range Interpretation Comments

 

             eGFR (test code = eGFR) 52                                     



[facility]

## 2022-12-03 NOTE — RAD REPORT
[facility]

 

CLINICAL HISTORY:  Cough

 

COMPARISON:  November 11, 2022

 

FINDINGS:   The lungs appear clear of acute infiltrate. The heart is borderline enlarged

 

Central venous catheter in place.

 

Chronic elevation right hemidiaphragm

## 2022-12-03 NOTE — ER
Nurse's Notes                                                                                     

 [facility]                                                                 

Name: Juli Rushing                                                                             

Age: 67 yrs                                                                                       

Sex: Female                                                                                       

: 1955                                                                                   

MRN: [id number]                                                                                   

Arrival Date: 2022                                                                          

Time: 09:10                                                                                       

Account#: [id number]                                                                            

Bed 2                                                                                             

Private MD:                                                                                       

Diagnosis: Persistent atrial fibrillation-WITH SVR;Bradycardia, unspecified;Hypokalemia;End stage 

  renal disease-ON HD;Anemia, unspecified                                                         

                                                                                                  

Presentation:                                                                                     

                                                                                             

09:12 Chief complaint: EMS states: client was at dialysis for 30min when she became           kc6 

      hypotensive, systolic in the 70's and 80's. client was afib in the 40's and 50's en         

      route. Coronavirus screen: Vaccine status: Patient reports being unvaccinated. At this      

      time, the client does not indicate any symptoms associated with coronavirus-19. Ebola       

      Screen: No symptoms or risks identified at this time. Initial Sepsis Screen: Does the       

      patient meet any 2 criteria? Systolic BP < 90 mmHg. Mean Arterial Pressure (MAP) < 65.      

      Does the patient have a suspected source of infection? No. Patient's initial sepsis         

      screen is negative. Risk Assessment: Do you want to hurt yourself or someone else?          

      Patient reports no desire to harm self or others. Onset of symptoms was December 3,         

      2022.                                                                                       

09:12 Method Of Arrival: EMS: [facility]                                                kc6 

09:12 Acuity: MIRTHA 2                                                                           jd3 

                                                                                                  

Triage Assessment:                                                                                

09:16 General: Appears in no apparent distress. comfortable, Behavior is calm, cooperative,   kc6 

      appropriate for age, drowsy. Pain: Denies pain. EENT: No signs and/or symptoms were         

      reported regarding the EENT system. Neuro: Drummond Agitation-Sedation Scale (RASS): -1     

      Drowsy Level of Consciousness is awake, alert, obeys commands, lethargic, Oriented to       

      person, place, time, situation, Appropriate for age. Respiratory: Airway is patent          

      Trachea midline Respiratory effort is even, unlabored, Respiratory pattern is regular,      

      symmetrical, Breath sounds are clear bilaterally. GI: No signs and/or symptoms were         

      reported involving the gastrointestinal system. : No signs and/or symptoms were           

      reported regarding the genitourinary system. Derm: No signs and/or symptoms reported        

      regarding the dermatologic system. Skin is intact, Skin is dry, Skin is pale, Skin          

      temperature is warm. Musculoskeletal: No signs and/or symptoms reported regarding the       

      musculoskeletal system. Circulation, motion, and sensation intact. Capillary refill < 3     

      seconds, Range of motion: intact in all extremities.                                        

09:16 Cardiovascular: Heart tones S1 S2 present Capillary refill < 3 seconds Dialysis shunt:  kc6 

      in the anterior aspect of left upper chest, with no erythema, with no edema, no             

      bleeding noted.                                                                             

                                                                                                  

Historical:                                                                                       

- Allergies:                                                                                      

09:15 Bactrim;                                                                                kc6 

09:15 cefepime;                                                                               kc6 

09:15 Codeine;                                                                                kc6 

09:15 PENICILLINS;                                                                            kc6 

09:15 Levofloxacin;                                                                           kc6 

09:15 Morphine; itching;                                                                      kc6 

09:15 QUINOLONES;                                                                             kc6 

- Home Meds:                                                                                      

09:15 midodrine oral [Active];                                                                kc6 

09:20 Alphagan P ophthalmic (eye) 1 drop twice a day [Active]; midodrine 5 mg oral tab PRN    jd3 

      with dialysis [Active]; apixaban 2.5 mg oral tab 1 tab 2 times per day [Active];            

      bumetanide 2 mg Oral tab 1 tab once daily [Active]; digoxin 125 mcg (0.125 mg) Oral tab     

      1 tab 3 X weekly [Active]; pantoprazole 40 mg oral TbEC 1 tab once daily [Active];          

      metoprolol tartrate 25 mg Oral tab 1 tab 2 times per day [Active]; midodrine 10 mg oral     

      tab 1 tab Q8H, don't take before dialysis, bring pills to traetment. [Active];              

- PMHx:                                                                                           

09:15 Atrial fibrillation; Dialysis; High Cholesterol; Congestive heart failure; Hypertensive kc6 

      disorder; stage 4 kidney failure;                                                           

- PSHx:                                                                                           

09:15 back surgery; Dialysis catheter LEFT upper chest;                                       kc6 

                                                                                                  

- Immunization history:: Client reports having NOT received the Covid vaccine. Flu                

  vaccine is up to date.                                                                          

- Social history:: Smoking status: Patient denies any tobacco usage or history of.                

                                                                                                  

                                                                                                  

Screenin:19 Abuse screen: Denies threats or abuse. Denies injuries from another. Nutritional        kc6 

      screening: No deficits noted. Tuberculosis screening: No symptoms or risk factors           

      identified. Fall Risk No fall in past 12 months (0 pts). No secondary diagnosis (0          

      pts). IV access (20 points). Ambulatory Aid- None/Bed Rest/Nurse Assist (0 pts). Gait-      

      Normal/Bed Rest/Wheelchair (0 pts) Mental Status- Oriented to own ability (0 pts).          

      Total Gates Fall Scale indicates No Risk (0-24 pts).                                        

                                                                                                  

Assessment:                                                                                       

09:19 Reassessment: please see triage assessment.                                             kc6 

10:25 Reassessment: No changes from previously documented assessment. Patient and/or family   jd3 

      updated on plan of care and expected duration. Pain level reassessed. Patient is alert,     

      oriented x 3, equal unlabored respirations, skin warm/dry/pink.                             

11:25 Reassessment: Patient appears in no apparent distress at this time. No changes from     [facility] 

      previously documented assessment. Patient and/or family updated on plan of care and         

      expected duration. Pain level reassessed. Patient is alert, oriented x 3, equal             

      unlabored respirations, skin warm/dry/pink. Patient denies pain at this time. Patient       

      states feeling better. Patient states symptoms have improved.                               

12:25 Reassessment: Patient appears in no apparent distress at this time. No changes from     [facility] 

      previously documented assessment. Patient and/or family updated on plan of care and         

      expected duration. Pain level reassessed. Patient is alert, oriented x 3, equal             

      unlabored respirations, skin warm/dry/pink. Patient denies pain at this time. Patient       

      states feeling better. Patient states symptoms have improved.                               

                                                                                                  

Vital Signs:                                                                                      

09:12 BP 83 / 50; Pulse 40; Resp 19 S; Temp 97.6(O); Pulse Ox 96% on R/A; Weight 102.51 kg    kc6 

      (R); Height 5 ft. 7 in. (170.18 cm) (R); Pain 0/10;                                         

10:24  / 62; Pulse 44; Resp 19; Pulse Ox 100% on Nebulizer Mask;                        jd3 

11:09  / 52; Pulse 69; Resp 22 S; Pulse Ox 100% on R/A;                                 kc6 

12:40 BP 98 / 57; Pulse 65; Resp 12 S; Pulse Ox 96% on R/A;                                   kc6 

09:12 Body Mass Index 35.40 (102.51 kg, 170.18 cm)                                            6 

                                                                                                  

ED Course:                                                                                        

09:10 Patient arrived in ED.                                                                  6 

09:12 Dawna Corado RN is Primary Nurse.                                                 kc6 

09:15 Arnold Larsen MD is Attending Physician.                                             [facility] 

09:15 Triage completed.                                                                       kc6 

09:19 Arm band placed on.                                                                     kc6 

09:19 Patient has correct armband on for positive identification. Placed in gown. Bed in low  kc6 

      position. Call light in reach. Side rails up X2. Adult w/ patient.                          

09:27 Inserted saline lock: 18 gauge in right antecubital area, using aseptic technique.      jd3 

      Blood collected. placed by [employer] student nurse observed by Reggie MAGALLANES and Hunter RN.             

09:48 SARS RAPID Sent.                                                                        kc6 

10:31 XRAY Chest (1 view) In Process Unspecified.                                             EDMS

10:34 intiated a transfer with Kaykay Martinez from the [facility].            

11:19 administrative approval given by Kaykay Martinez/ patient has been accepted to [facility] [address] [address] bed 7/ Dr. Denisse Robles has accepted the patient in transfer.           

      report to be called to [phone].                                                        

12:20 No provider procedures requiring assistance completed. Patient transferred, IV remains  kc6 

      in place.                                                                                   

                                                                                                  

Administered Medications:                                                                         

09:27 Not Given (Duplicate Order): NS 0.9% 1000 ml IV at 125 ml/hr continuous                 cee 

09:35 Drug: NS 0.45 % 1000 ml Route: IV; Rate: 75 ml/hr; Site: right antecubital;             jd3 

09:45 Drug: GlucaGen (glucagon) 1 mg Route: IVP; Site: right antecubital;                     jd3 

10:45 Follow up: Response: No adverse reaction; Blood sugar is elevated                       kc6 

09:50 Drug: Albuterol - atroVENT (ipratropium) (3:1) (2.5 mg - 0.5 mg) 3 ml Route: Nebulizer; jd3 

10:50 Follow up: Response: No adverse reaction                                                kc6 

09:58 Drug: Calcium Gluconate 1 grams Route: IVPB; Infused Over: 20 mins; Site: right         jd3 

      antecubital;                                                                                

10:24 Follow up: Response: No adverse reaction; IV Status: Completed infusion; IV Intake:     jd3 

      100ml                                                                                       

                                                                                                  

                                                                                                  

Medication:                                                                                       

12:20 VIS not applicable for this client.                                                     kc6 

                                                                                                  

Intake:                                                                                           

10:24 IV: 100ml; Total: 100ml.                                                                jd3 

                                                                                                  

Outcome:                                                                                          

10:49 ER care complete, transfer ordered by MD.                                               cee 

12:19 Transferred by ground EMS to [facility], Transfer form completed.    kc6 

      Note:  report given to ELPIDIO Baez                                                           

12:19 Condition: stable                                                                           

12:19 Instructed on the need for transfer.                                                        

13:59 Patient left the ED.                                                                    kc6 

                                                                                                  

Signatures:                                                                                       

Dispatcher [employer]                           EDArnold Case MD MD cha Davies, Jonathon, RN                    RN   jd3                                                  

Kaykay Hayes Kaitlyn, RN                   RN   kc6                                                  

                                                                                                  

Corrections: (The following items were deleted from the chart)                                    

09:33 09:12 Acuity: MIRTHA 3 kc6                                                                 jd3 

11:08 10:31 Neuro: Seizure activity Type of seizure: grand mal seizure. Seizure lasted        kc6 

      approximately 1 minutes. Patient is post-ictal at this time. kc6                            

11:08 10:31 Neuro: Seizure activity Type of seizure: grand mal seizure. Seizure lasted        kc6 

      approximately 1 minutes. Patient is post-ictal at this time. kc6                            

12:19 09:16 Cardiovascular: Heart tones S1 S2 present Capillary refill < 3 seconds Rhythm is  kc6 

      atrial fibrillation Dialysis shunt: in the anterior aspect of left upper chest, with        

      palpable thrill, with auscultated bruit, with no erythema, with no edema, no bleeding       

      noted kc6                                                                                   

                                                                                                  

**************************************************************************************************

## 2022-12-03 NOTE — EDPHYS
Physician Documentation                                                                           

 [facility]                                                                 

Name: Juli Rushing                                                                             

Age: 67 yrs                                                                                       

Sex: Female                                                                                       

: 1955                                                                                   

MRN: [id number]                                                                                   

Arrival Date: 2022                                                                          

Time: 09:10                                                                                       

Account#: [id number]                                                                            

Bed 2                                                                                             

Private MD:                                                                                       

ED Physician Arnold Larsen                                                                      

HPI:                                                                                              

                                                                                             

09:28 This 67 yrs old  Female presents to ER via EMS with complaints of LOW BP, LOW  cee 

      HEART RATE.                                                                                 

09:28 The patient has shortness of breath at rest. Onset: The symptoms/episode began/occurred cee 

      just prior to arrival. Duration: The symptoms are continuous, and are unchanged since       

      they started. The patient's shortness of breath is aggravated by nothing, is alleviated     

      by nothing. SENT FROM DIALYSIS. Associated signs and symptoms: Pertinent positives:         

      non-productive cough. Severity of symptoms: At their worst the symptoms were mild           

      moderate in the emergency department the symptoms are unchanged. Onset: The                 

      symptoms/episode began/occurred this morning. The patient has experienced similar           

      episodes in the past.                                                                       

                                                                                                  

Historical:                                                                                       

- Allergies:                                                                                      

09:15 Bactrim;                                                                                kc6 

09:15 cefepime;                                                                               kc6 

09:15 Codeine;                                                                                kc6 

09:15 PENICILLINS;                                                                            kc6 

09:15 Levofloxacin;                                                                           kc6 

09:15 Morphine; itching;                                                                      kc6 

09:15 QUINOLONES;                                                                             kc6 

- Home Meds:                                                                                      

09:15 midodrine oral [Active];                                                                kc6 

09:20 Alphagan P ophthalmic (eye) 1 drop twice a day [Active]; midodrine 5 mg oral tab PRN    jd3 

      with dialysis [Active]; apixaban 2.5 mg oral tab 1 tab 2 times per day [Active];            

      bumetanide 2 mg Oral tab 1 tab once daily [Active]; digoxin 125 mcg (0.125 mg) Oral tab     

      1 tab 3 X weekly [Active]; pantoprazole 40 mg oral TbEC 1 tab once daily [Active];          

      metoprolol tartrate 25 mg Oral tab 1 tab 2 times per day [Active]; midodrine 10 mg oral     

      tab 1 tab Q8H, don't take before dialysis, bring pills to traetment. [Active];              

- PMHx:                                                                                           

09:15 Atrial fibrillation; Dialysis; High Cholesterol; Congestive heart failure; Hypertensive kc6 

      disorder; stage 4 kidney failure;                                                           

- PSHx:                                                                                           

09:15 back surgery; Dialysis catheter LEFT upper chest;                                       kc6 

                                                                                                  

- Immunization history:: Client reports having NOT received the Covid vaccine. Flu                

  vaccine is up to date.                                                                          

- Social history:: Smoking status: Patient denies any tobacco usage or history of.                

                                                                                                  

                                                                                                  

ROS:                                                                                              

09:32 Constitutional: Negative for fever, chills, and weight loss, Eyes: Negative for injury, cee 

      pain, redness, and discharge, ENT: Negative for injury, pain, and discharge, Neck:          

      Negative for injury, pain, and swelling, Abdomen/GI: Negative for abdominal pain,           

      nausea, vomiting, diarrhea, and constipation, Back: Negative for injury and pain, :       

      Negative for injury, bleeding, discharge, and swelling, MS/Extremity: Negative for          

      injury and deformity, Skin: Negative for injury, rash, and discoloration, Neuro:            

      Negative for headache, weakness, numbness, tingling, and seizure, Psych: Negative for       

      depression, anxiety, suicide ideation, homicidal ideation, and hallucinations,              

      Allergy/Immunology: Negative for hives, rash, and allergies, Endocrine: Negative for        

      neck swelling, polydipsia, polyuria, polyphagia, and marked weight changes,                 

      Hematologic/Lymphatic: Negative for swollen nodes, abnormal bleeding, and unusual           

      bruising.                                                                                   

09:32 Cardiovascular: Positive for palpitations.                                                  

09:32 Respiratory: Positive for shortness of breath.                                              

                                                                                                  

Exam:                                                                                             

09:32 Constitutional:  This is a well developed, well nourished patient who is awake, alert,  cee 

      and in no acute distress. Head/Face:  Normocephalic, atraumatic. Eyes:  Pupils equal        

      round and reactive to light, extra-ocular motions intact.  Lids and lashes normal.          

      Conjunctiva and sclera are non-icteric and not injected.  Cornea within normal limits.      

      Periorbital areas with no swelling, redness, or edema. ENT:  Nares patent. No nasal         

      discharge, no septal abnormalities noted.  Tympanic membranes are normal and external       

      auditory canals are clear.  Oropharynx with no redness, swelling, or masses, exudates,      

      or evidence of obstruction, uvula midline.  Mucous membranes moist. Neck:  Trachea          

      midline, no thyromegaly or masses palpated, and no cervical lymphadenopathy.  Supple,       

      full range of motion without nuchal rigidity, or vertebral point tenderness.  No            

      Meningismus. Chest/axilla:  Normal chest wall appearance and motion.  Nontender with no     

      deformity.  No lesions are appreciated. Abdomen/GI:  Soft, non-tender, with normal          

      bowel sounds.  No distension or tympany.  No guarding or rebound.  No evidence of           

      tenderness throughout. Back:  No spinal tenderness.  No costovertebral tenderness.          

      Full range of motion. Female :  Normal external genitalia. Skin:  Warm, dry with          

      normal turgor.  Normal color with no rashes, no lesions, and no evidence of cellulitis.     

      MS/ Extremity:  Pulses equal, no cyanosis.  Neurovascular intact.  Full, normal range       

      of motion. Psych:  Awake, alert, with orientation to person, place and time.  Behavior,     

      mood, and affect are within normal limits.                                                  

09:32 Cardiovascular: Rate: bradycardic, actual rate is  40 bpm, Rhythm: irregularly              

      irregular, Pulses: Pulses are 3+ in bilateral radial, brachial, femoral, popliteal,         

      posterior tibial and and dorsalis pedis arteries.. Heart sounds: normal, Edema: 2+          

      edema to level of left midcalf and right midcalf, JVD: is noted bilaterally, to 2 cm.       

09:46 ECG was reviewed by the Attending Physician.                                            [facility] 

                                                                                                  

Vital Signs:                                                                                      

09:12 BP 83 / 50; Pulse 40; Resp 19 S; Temp 97.6(O); Pulse Ox 96% on R/A; Weight 102.51 kg    kc6 

      (R); Height 5 ft. 7 in. (170.18 cm) (R); Pain 0/10;                                         

10:24  / 62; Pulse 44; Resp 19; Pulse Ox 100% on Nebulizer Mask;                        jd3 

11:09  / 52; Pulse 69; Resp 22 S; Pulse Ox 100% on R/A;                                 kc6 

12:40 BP 98 / 57; Pulse 65; Resp 12 S; Pulse Ox 96% on R/A;                                   kc6 

09:12 Body Mass Index 35.40 (102.51 kg, 170.18 cm)                                            kc6 

                                                                                                  

MDM:                                                                                              

09:15 Patient medically screened.                                                             cee 

09:50 Differential diagnosis: Anemia CHF exacerbation, Chronic Obstructive Pulmonary Disease  cee 

      Myocardial Infarction pneumonia, pulmonary edema, reactive airway disease, Sepsis           

      Unstable Angina. Antibiotic administration: Not indicated. Differential Diagnosis           

      sepsis. The patient's Wells Deep Vein Thrombosis Score was calculated as follows: Total     

      Score: 0-2 Pts- Low Risk. The patient's pulmonary embolism risk score was calculated as     

      follows: Total Score: 0-2 points. This patient was found to be at low risk for a            

      pulmonary embolism by using the Well's assessment criteria. Immunization status:            

      Pneumococcal vaccine: Influenza vaccine: Data reviewed: vital signs, nurses notes, EMS      

      record, lab test result(s), EKG, radiologic studies, plain films. Data interpreted:         

      Cardiac monitor: rate is 96 beats/min, rhythm is atrial fibrillation, Pulse oximetry:       

      on room air is 92 %. Test interpretation: by ED physician or midlevel provider: ECG,        

      plain radiologic studies.                                                                   

                                                                                                  

                                                                                             

09:15 Order name: Basic Metabolic Panel; Complete Time: 10:45                                 [facility] 

                                                                                             

09:15 Order name: CBC with Diff; Complete Time: 11:08                                         [facility] 

                                                                                             

09:15 Order name: LFT's; Complete Time: 10:45                                                 [facility] 

                                                                                             

09:15 Order name: Magnesium; Complete Time: 10:45                                             [facility] 

                                                                                             

09:15 Order name: NT PRO-BNP; Complete Time: 10:45                                            [facility] 

                                                                                             

09:15 Order name: PT-INR; Complete Time: 11:08                                                [facility] 

                                                                                             

09:15 Order name: Troponin HS; Complete Time: 10:45                                           [facility] 

                                                                                             

09:15 Order name: XRAY Chest (1 view); Complete Time: 11:08                                   [facility] 

                                                                                             

09:15 Order name: SARS RAPID; Complete Time: 11:08                                            [facility] 

                                                                                             

09:19 Order name: Digoxin; Complete Time: 10:20                                               [facility] 

                                                                                             

10:47 Order name: Manual Differential; Complete Time: 11:08                                   [facility]

                                                                                             

09:15 Order name: EKG; Complete Time: 09:16                                                   [facility] 

                                                                                             

09:15 Order name: Cardiac monitoring; Complete Time: 09:20                                    [facility] 

                                                                                             

09:15 Order name: EKG - Nurse/Tech; Complete Time: 09:47                                      [facility] 

                                                                                             

09:15 Order name: IV Saline Lock; Complete Time: 09:33                                        [facility] 

                                                                                             

09:15 Order name: Labs collected and sent; Complete Time: 09:33                               [facility] 

                                                                                             

09:15 Order name: O2 Per Protocol; Complete Time: 09:20                                       [facility] 

                                                                                             

09:15 Order name: O2 Sat Monitoring; Complete Time: 09:20                                     [facility] 

                                                                                                  

EC:46 Rate is 50 beats/min. Rhythm is irregularly irregular. QRS Axis is Normal. AR interval  cee 

      is normal. QRS interval is normal. QT interval is normal. No Q waves. T waves are           

      Normal. No ST changes noted. Clinical impression: Atrial Fibrillation. Interpreted by       

      me. Reviewed by me.                                                                         

                                                                                                  

Administered Medications:                                                                         

09:27 Not Given (Duplicate Order): NS 0.9% 1000 ml IV at 125 ml/hr continuous                 cee 

09:35 Drug: NS 0.45 % 1000 ml Route: IV; Rate: 75 ml/hr; Site: right antecubital;             jd3 

09:45 Drug: GlucaGen (glucagon) 1 mg Route: IVP; Site: right antecubital;                     jd3 

10:45 Follow up: Response: No adverse reaction; Blood sugar is elevated                       kc6 

09:50 Drug: Albuterol - atroVENT (ipratropium) (3:1) (2.5 mg - 0.5 mg) 3 ml Route: Nebulizer; jd3 

10:50 Follow up: Response: No adverse reaction                                                kc6 

09:58 Drug: Calcium Gluconate 1 grams Route: IVPB; Infused Over: 20 mins; Site: right         jd3 

      antecubital;                                                                                

10:24 Follow up: Response: No adverse reaction; IV Status: Completed infusion; IV Intake:     jd3 

      100ml                                                                                       

                                                                                                  

                                                                                                  

Disposition Summary:                                                                              

22 10:49                                                                                    

Transfer Ordered                                                                                  

      Transfer Location: [facility]                                cee 

      Reason: Higher level of care                                                            cee 

      Condition: Fair                                                                         cee 

      Problem: an ongoing problem                                                             cee 

      Symptoms: have worsened                                                                 cee 

      Accepting Physician: TO ICU(22 13:59)                                             kc6 

      Diagnosis                                                                                   

        - Persistent atrial fibrillation - WITH SVR                                           cee 

        - Bradycardia, unspecified                                                            cee 

        - Hypokalemia                                                                         cee 

        - End stage renal disease - ON HD                                                     cee 

        - Anemia, unspecified                                                                 cee 

      Forms:                                                                                      

        - Medication Reconciliation Form                                                      cee 

        - SBAR form                                                                           cee 

Signatures:                                                                                       

Dispatcher [employer]                           Arnold Liu MD MD cha Davies, Jonathon, RN                    RN   jd3                                                  

Dawna Corado RN                   RN   kc6                                                  

                                                                                                  

Corrections: (The following items were deleted from the chart)                                    

13:59 10:49 TO ICU cee                                                                        kc6 

                                                                                                  

**************************************************************************************************

## 2022-12-05 NOTE — EKG
Test Date:    2022-12-03               Test Time:    09:38:51

Technician:   LISA                                    

                                                     

MEASUREMENT RESULTS:                                       

Intervals:                                           

Rate:         50                                     

NE:                                                  

QRSD:         94                                     

QT:           454                                    

QTc:          413                                    

Axis:                                                

P:                                                   

NE:                                                  

QRS:          126                                    

T:            163                                    

                                                     

INTERPRETIVE STATEMENTS:                                       

                                                     

Atrial fibrillation with slow ventricular response

Incomplete right bundle branch block

Right ventricular hypertrophy with repolarization abnormality

T wave abnormality, consider lateral ischemia or digitalis effect

Abnormal ECG

Compared to ECG 11/11/2022 11:11:53

Incomplete right bundle-branch block now present

Early repolarization now present

T-wave abnormality now present

Possible ischemia now present

ST (T wave) deviation no longer present



Electronically Signed On 12-05-22 13:50:02 CST by Venancio Barajas

## 2022-12-24 ENCOUNTER — HOSPITAL ENCOUNTER (INPATIENT)
Dept: HOSPITAL 97 - ER | Age: 67
LOS: 1 days | Discharge: HOME | DRG: 291 | End: 2022-12-25
Attending: INTERNAL MEDICINE | Admitting: INTERNAL MEDICINE
Payer: COMMERCIAL

## 2022-12-24 VITALS — BODY MASS INDEX: 37.5 KG/M2

## 2022-12-24 DIAGNOSIS — Z79.899: ICD-10-CM

## 2022-12-24 DIAGNOSIS — I95.3: ICD-10-CM

## 2022-12-24 DIAGNOSIS — Z88.1: ICD-10-CM

## 2022-12-24 DIAGNOSIS — Z20.822: ICD-10-CM

## 2022-12-24 DIAGNOSIS — I48.91: ICD-10-CM

## 2022-12-24 DIAGNOSIS — Z88.5: ICD-10-CM

## 2022-12-24 DIAGNOSIS — J96.01: ICD-10-CM

## 2022-12-24 DIAGNOSIS — D63.1: ICD-10-CM

## 2022-12-24 DIAGNOSIS — N18.6: ICD-10-CM

## 2022-12-24 DIAGNOSIS — Z88.0: ICD-10-CM

## 2022-12-24 DIAGNOSIS — Z79.01: ICD-10-CM

## 2022-12-24 DIAGNOSIS — I13.2: Primary | ICD-10-CM

## 2022-12-24 DIAGNOSIS — I50.31: ICD-10-CM

## 2022-12-24 DIAGNOSIS — Z98.51: ICD-10-CM

## 2022-12-24 DIAGNOSIS — Z99.2: ICD-10-CM

## 2022-12-24 DIAGNOSIS — Z90.49: ICD-10-CM

## 2022-12-24 DIAGNOSIS — E78.5: ICD-10-CM

## 2022-12-24 LAB
ALBUMIN SERPL BCP-MCNC: 3 G/DL (ref 3.4–5)
ALP SERPL-CCNC: 133 U/L (ref 45–117)
ALT SERPL W P-5'-P-CCNC: 11 U/L (ref 13–56)
AST SERPL W P-5'-P-CCNC: 5 U/L (ref 15–37)
BUN BLD-MCNC: 32 MG/DL (ref 7–18)
COHGB MFR BLDA: 1.9 % (ref 0–1.5)
GLUCOSE SERPLBLD-MCNC: 114 MG/DL (ref 74–106)
HCT VFR BLD CALC: 27.8 % (ref 36–45)
LYMPHOCYTES # SPEC AUTO: 0.6 K/UL (ref 0.7–4.9)
MAGNESIUM SERPL-MCNC: 2.2 MG/DL (ref 1.6–2.4)
MCV RBC: 97 FL (ref 80–100)
OXYHGB MFR BLDA: 91 % (ref 94–97)
PMV BLD: 7.6 FL (ref 7.6–11.3)
POTASSIUM SERPL-SCNC: 3.7 MMOL/L (ref 3.5–5.1)
RBC # BLD: 2.87 M/UL (ref 3.86–4.86)
SAO2 % BLDA: 94.2 % (ref 92–98.5)
SARS-COV-2 RNA RESP QL NAA+PROBE: NEGATIVE
TROPONIN I SERPL HS-MCNC: 21.3 PG/ML (ref ?–58.9)

## 2022-12-24 PROCEDURE — 93005 ELECTROCARDIOGRAM TRACING: CPT

## 2022-12-24 PROCEDURE — 80053 COMPREHEN METABOLIC PANEL: CPT

## 2022-12-24 PROCEDURE — 5A1D70Z PERFORMANCE OF URINARY FILTRATION, INTERMITTENT, LESS THAN 6 HOURS PER DAY: ICD-10-PCS

## 2022-12-24 PROCEDURE — 96374 THER/PROPH/DIAG INJ IV PUSH: CPT

## 2022-12-24 PROCEDURE — 83735 ASSAY OF MAGNESIUM: CPT

## 2022-12-24 PROCEDURE — 71045 X-RAY EXAM CHEST 1 VIEW: CPT

## 2022-12-24 PROCEDURE — 36415 COLL VENOUS BLD VENIPUNCTURE: CPT

## 2022-12-24 PROCEDURE — 83880 ASSAY OF NATRIURETIC PEPTIDE: CPT

## 2022-12-24 PROCEDURE — 99285 EMERGENCY DEPT VISIT HI MDM: CPT

## 2022-12-24 PROCEDURE — 0240U: CPT

## 2022-12-24 PROCEDURE — 84484 ASSAY OF TROPONIN QUANT: CPT

## 2022-12-24 PROCEDURE — 82805 BLOOD GASES W/O2 SATURATION: CPT

## 2022-12-24 PROCEDURE — 85025 COMPLETE CBC W/AUTO DIFF WBC: CPT

## 2022-12-24 PROCEDURE — 90935 HEMODIALYSIS ONE EVALUATION: CPT

## 2022-12-24 RX ADMIN — TIZANIDINE SCH MG: 4 TABLET ORAL at 20:19

## 2022-12-24 RX ADMIN — MIDODRINE HYDROCHLORIDE SCH MG: 5 TABLET ORAL at 20:19

## 2022-12-24 RX ADMIN — SEVELAMER CARBONATE SCH MG: 800 TABLET, FILM COATED ORAL at 13:12

## 2022-12-24 RX ADMIN — SEVELAMER CARBONATE SCH MG: 800 TABLET, FILM COATED ORAL at 20:19

## 2022-12-24 RX ADMIN — APIXABAN SCH MG: 2.5 TABLET, FILM COATED ORAL at 20:19

## 2022-12-24 RX ADMIN — MIDODRINE HYDROCHLORIDE SCH MG: 5 TABLET ORAL at 13:12

## 2022-12-24 RX ADMIN — PANTOPRAZOLE SODIUM SCH MG: 40 TABLET, DELAYED RELEASE ORAL at 20:18

## 2022-12-24 RX ADMIN — MORPHINE SULFATE PRN MG: 4 INJECTION, SOLUTION INTRAMUSCULAR; INTRAVENOUS at 21:16

## 2022-12-24 NOTE — XMS REPORT
Continuity of Care Document

                          Created on:2022



Patient:SUZI SEARS

Sex:Female

:1955

External Reference #:[id number]





Demographics







                          Address                   [address]



                                                    [place] [address]

 

                          Home Phone                [phone]

 

                          Work Phone                [phone]

 

                          Mobile Phone              [phone]

 

                          Email Address             [email or web address]

 

                          Preferred Language        English

 

                          Marital Status            Unknown

 

                          Shinto Affiliation     Unknown

 

                          Race                      Unknown

 

                          Additional Race(s)        Unavailable



                                                    White

 

                          Ethnic Group              Unknown









Author







                          Organization              [facility]

t

 

                          Address                   [address]



                                                    [place] [address]

 

                          Phone                     [phone]









Support







                Name            Relationship    Address         Phone

 

                JEIMY SEARS EDUARDO SIMON               [address] [phone]

[phone]



                                                [place] [address] 

 

                JEIMY SEARS SPOU            [address] [phone]



                                                [place] [address] 

 

                JEIMY SEARS SPOU            [address]       [phone]

[phone]



                                                [place] [address] 

 

                CHITRA SEARSU            [address]       [phone]



                                                [place] [address] 

 

                Jeimy Sears Spouse          [phone]

[phone]



                                                [place] [address] 

 

                Jonah Sears   Child           Unavailable     [phone]









Care Team Providers







                    Name                Role                Phone

 

                    CHARISSE COYNE  Primary Care Physician Unavailable

 

                    Clark Cuellar   Attending Clinician Unavailable

 

                    VINAYAK BROUSSARD      Attending Clinician Unavailable

 

                    Luma Beckham MD   Attending Clinician [phone]

 

                    Joanna WIGGINS, Sanket Dukes Attending Clinician [phone]

 

                    Vinayak Broussard MD   Attending Clinician [phone]

 

                    LUMA BECKHAM      Attending Clinician Unavailable

 

                    SARBJIT RHODES     Attending Clinician Unavailable

 

                    DO ALISIA REYES    Attending Clinician Unavailable

 

                    Bridger WIGGINS, Adriel Gale Attending Clinician [phone]

 

                    Daylin Little MD   Attending Clinician [phone]

 

                    Jacobo WIGGINS, Quinton Gonsalez Attending Clinician [phone]

[phone]

 

                    Jean-Claude WIGGINS, Martina  Attending Clinician [phone]

 

                    Louann Odom MA Attending Clinician Unavailable

 

                    Venancio Barajas      Attending Clinician Unavailable

 

                    Koko         Attending Clinician Unavailable

 

                    Addison WIGGINS, Louann Carrizales Attending Clinician [phone]

[phone]

 

                    Aki Dupree MD      Attending Clinician [phone]

 

                    Luc WIGGINS, Shelby Attending Clinician [phone]

 

                    Fantasma Squires       Attending Clinician [phone]

 

                    Charisse Coyne  Attending Clinician [phone]

 

                    Maureen WIGGINS, Martha Chu Attending Clinician [phone]

[phone]

 

                    Parveen Hutchinson MD Attending Clinician [phone]

 

                    Shaikh ROSALIE, Rosalio     Attending Clinician [phone]

 

                    Christine Saini DO Attending Clinician [phone]

 

                    Edinson WIGGINS, Trev Hays Attending Clinician [phone]

 

                    Hasmukh WIGGINS, Mariluz Sanchez Attending Clinician [phone]

 

                    Suzie Dominguez       Attending Clinician Unavailable

 

                    Hadley Medina         Attending Clinician Unavailable

 

                    Antionette MAGALLANES, Joana      Attending Clinician Unavailable

 

                    Miriam WIGGINS, Suresh Landry Attending Clinician [phone]

 

                    Nieves WIGGINS, Melissa PHAM Attending Clinician [phone]

[phone]

 

                    Naseem Willis MD Attending Clinician [phone]

 

                    Elaina Clark MD Attending Clinician [phone]

 

                    Brenna Jacobs MD Attending Clinician [phone]

 

                    [email or web address]_J      Attending Clinician Unavailable

 

                    MD MARTHA LAMAR Attending Clinician Unavailable

 

                    JULIANNA KEANE  Attending Clinician Unavailable

 

                    MAGY EARL     Attending Clinician Unavailable

 

                    MELVIN MANZO   Attending Clinician Unavailable

 

                    MD MELVIN MANZO Attending Clinician Unavailable

 

                    MD DAYLIN LITTLE Attending Clinician Unavailable

 

                    MD MELISSA PICKETT Attending Clinician Unavailable

 

                    Brenna Jacobs    Attending Clinician [phone]

 

                    BRENNA JACOBS    Attending Clinician Unavailable

 

                    Abdi Schulz Attending Clinician [phone]

 

                    Gabriela Rosenthal Attending Clinician [phone]

[phone]

 

                    EMILIA BONILLA        Attending Clinician Unavailable

 

                    DR GOPAL ADEN    Attending Clinician Unavailable

 

                    Yousif Rhodes Attending Clinician [phone]

 

                    Randy Cunningham   Attending Clinician [phone]

 

                    Yoan Lei     Attending Clinician [phone]

 

                    Abbi Somers  Attending Clinician [phone]

 

                    Vignesh See Attending Clinician [phone]

 

                    Charisse Coyne  Admitting Clinician Unavailable

 

                    LUMA BECKHAM      Admitting Clinician Unavailable

 

                    ZHEN JENKINS     Admitting Clinician Unavailable

 

                    MARTINA DOUGLASS     Admitting Clinician Unavailable

 

                    Anthony Elkins     Admitting Clinician Unavailable

 

                    Koko         Admitting Clinician Unavailable

 

                    AKI DUPREE         Admitting Clinician Unavailable

 

                    CHRISTINE SAINI       Admitting Clinician Unavailable

 

                    MD ROSALIO ANGULO     Admitting Clinician Unavailable

 

                    MELISSA PICKETT     Admitting Clinician Unavailable

 

                    DEBBY_EVELIN_Alec_EDUARDO      Admitting Clinician Unavailable

 

                    MD MARTHA LAMAR Admitting Clinician Unavailable

 

                    MELVIN MANZO   Admitting Clinician Unavailable

 

                    MD MELVIN MANZO Admitting Clinician Unavailable

 

                    DAYLIN LITTLE      Admitting Clinician Unavailable

 

                    MD KAUSHIK BECKHAM   Admitting Clinician Unavailable

 

                    MD MELISSA PICKETT Admitting Clinician Unavailable

 

                    DR GOPAL ADEN    Admitting Clinician Unavailable









Payers







           Payer Name Policy Type Policy Number Effective Date Expiration Date BEAU francisco

 

           MEDICARE A B            [id number] 2021            



                                            00:00:00              

 

           [employer]            [id number]            



           [place]                               00:00:00              

 

           [place]:            [id number] 2021            



           [employer]                       00:00:00              







Problems







       Condition Condition Condition Status Onset  Resolution Last   Treating Co

mments 

Source



       Name   Details Category        Date   Date   Treatment Clinician        



                                                 Date                 

 

       Hypotensio Hypotensio Disease Active 2022                             [facility]

[facility]



       n      n                    [facility]                               [facility]



                                   [facility]



                                   [facility]                                 [facility]

 

       Chest pain Chest pain Disease Active 2022                             M

ethodi



       with high with high               1-05                               st



       risk of risk of               00:00:                             [facility]



       acute  acute                00                                 l



       coronary coronary                                                  



       syndrome syndrome                                                  

 

       Hyperkalem Hyperkalem Disease Active                              M

ethodi



       ia     ia                   



                                   00:00:                             Hospita



                                   00                                 l

 

       Fluid  Fluid  Disease Active                              Methodi



       overload overload                                              st



                                   00:00:                             [facility]ospita



                                   00                                 l

 

       COVID-19 COVID-19 Disease Active                              [facility]

di



       virus  virus                                               st



       detected detected               00:00:                             Hospit

a



                                   00                                 l

 

       Acute back Acute back Disease Active                              M

ethodi



       pain   pain                                                st



                                   00:00:                             [facility]ospita



                                   00                                 l

 

       Sepsis Sepsis Disease Active                              Methodi



                                                                  st



                                   00:00:                             [facility]ospita



                                   00                                 l

 

       ARF (acute ARF (acute Disease Active                              M

ethodi



       renal  renal                



       failure) failure)               00:00:                             Hospit

a



                                   00                                 l

 

       Hypoxemia Hypoxemia Disease Active                              Met

hodi



                                   



                                   00:00:                             Hospita



                                   00                                 l

 

       Pulmonary Pulmonary Disease Active                              Met

hodi



       edema  edema                



                                   00:00:                             Hospita



                                   00                                 l

 

       UTI    UTI    Disease Active                              Methodi



       (urinary (urinary               



       tract  tract                00:00:                             [facility]ospita



       infection) infection)               00                                 l

 

       Shortness Shortness Disease Active                              Met

hodi



       of breath of breath               



                                   00:00:                             Hospita



                                   00                                 l

 

       Venous Venous Disease Active                              Methodi



       stasis stasis               



       ulcer of ulcer of               00:00:                             Hospit

a



       lower  lower                00                                 l



       extremity extremity                                                  

 

       Weight Weight Disease Active                              Methodi



       gain   gain                 



                                   00:00:                             Hospita



                                   00                                 l

 

       Pain in Pain in Disease Active                              Methodi



       wrist  wrist                



                                   00:00:                             [facility]ospita



                                   00                                 l

 

       Prerenal Prerenal Disease Active                              [facility]

di



       azotemia azotemia               



                                   00:00:                             [facility]ospita



                                   00                                 l

 

       Proteinuri Proteinuri Disease Active                              M

ethodi



       a      a                    



                                   00:00:                             [facility]ospita



                                   00                                 l

 

       Peripheral Peripheral Disease Active                              M

ethodi



       venous venous               



       insufficie insufficie               00:00:                             Ho

spita



       ncy    ncy                  00                                 l

 

       Malignant Malignant Disease Active                              Met

hodi



       neoplasm neoplasm               



       of skin of of skin of               00:00:                             Ho

spita



       upper  upper                00                                 l



       extremity extremity                                                  

 

       Migraine Migraine Disease Active                              [facility]

di



       headache headache               



                                   00:00:                             [facility]ospita



                                   00                                 l

 

       Diabetes Diabetes Disease Active                              [facility]

di



       mellitus mellitus               



                                   00:00:                             [facility]ospita



                                   00                                 l

 

       Dysuria Dysuria Disease Active                              Methodi



                                                                  st



                                   00:00:                             Hospita



                                   00                                 l

 

       Edema  Edema  Disease Active                              Methodi



                                   



                                   00:00:                             Hospita



                                   00                                 l

 

       Hyperlipid Hyperlipid Disease Active                       Overview

: Methodi



       emia   emia                                         Formattin st



                                   00:00:                      g of this Hospita



                                   00                          note   l



                                                               might be 



                                                               different 



                                                               from the 



                                                               original. 



                                                               Data   



                                                               migrated 



                                                               from [employer] 



                                                               [employer] 



                                                               y on   



                                                               5/30/15.D 



                                                               nancie    



                                                               migrated 



                                                               from [employer] 



                                                               [employer] 



                                                               y on   



                                                               5/30/15.D 



                                                               nancie    



                                                               migrated 



                                                               from [employer] 



                                                               [employer] 



                                                               y on   



                                                               5/30/15.D 



                                                               nancie    



                                                               migrated 



                                                               from [employer] 



                                                               [employer] 



                                                               y on   



                                                               5/30/15.D 



                                                               nancie    



                                                               migrated 



                                                               from [employer] 



                                                               [employer] 



                                                               y on   



                                                               5/30/15. 

 

       Hyperparat Hyperparat Disease Active                              M

ethodi



       hyroidism hyroidism               



                                   00:00:                             Hospita



                                   00                                 l

 

       Hyperurice Hyperurice Disease Active                       Overview

: Methodi



       keegan    keegan                                          Formattin st



                                   00:00:                      g of this Hospita



                                   00                          note   l



                                                               might be 



                                                               different 



                                                               from the 



                                                               original. 



                                                               Data   



                                                               migrated 



                                                               from [employer] 



                                                               [employer] 



                                                               y on   



                                                               5/30/15.D 



                                                               nancie    



                                                               migrated 



                                                               from [employer] 



                                                               [employer] 



                                                               y on   



                                                               5/30/15.D 



                                                               nancie    



                                                               migrated 



                                                               from [employer] 



                                                               [employer] 



                                                               y on   



                                                               5/30/15.D 



                                                               nancie    



                                                               migrated 



                                                               from [employer] 



                                                               [employer] 



                                                               y on   



                                                               5/30/15.D 



                                                               nancie    



                                                               migrated 



                                                               from [employer] 



                                                               [employer] 



                                                               y on   



                                                               5/30/15. 

 

       Abnormal Abnormal Disease Active                              [facility]

di



       urinalysis urinalysis               



                                   00:00:                             Hospita



                                   00                                 l

 

       Abnormal Abnormal Disease Active                              [facility]

di



       gait   gait                 



                                   00:00:                             Hospita



                                   00                                 l

 

       Ankle  Ankle  Disease Active                              Methodi



       swelling swelling               



                                   00:00:                             [facility]ospita



                                   00                                 l

 

       Atrial Atrial Disease Active                              Methodi



       fibrillati fibrillati               



       on     on                   00:00:                             Hospita



                                   00                                 l

 

       Chronic Chronic Disease Active                              Methodi



       venous venous               9-23                               st



       stasis stasis               00:00:                             Hospita



                                   00                                 l

 

       Abdominal Abdominal Disease Active                              Met

hodi



       pain   pain                                                st



                                   00:00:                             Hospita



                                   00                                 l

 

       CHF    CHF    Disease Active                              Methodi



       (congestiv (congestiv               6-29                               st



       e heart e heart               00:00:                             Hospita



       failure) failure)               00                                 l

 

       Flank pain Flank pain Disease Active                              M

ethodi



                                   6-18                               st



                                   00:00:                             Hospita



                                   00                                 l

 

       Renal  Renal  Disease Active                       Overview: Method

i



       stone  stone                6-18                        Formattin st



                                   00:00:                      g of this [facility]osp[facility]



                                   00                          note   l



                                                               might be 



                                                               different 



                                                               from the 



                                                               original. 



                                                               Added  



                                                               automatic 



                                                               ally from 



                                                               request 



                                                               for    



                                                               surgery 



                                                               [id number] 

 

       SOLO     SOLO   Diagnosis Active 2021               [facility]

[facility]



              Active                         12:03:00               l



              2021               00:00:                             [facility]

n



               [facility]               00                                 



              [place]                                                    

 

       Symptomati Symptomati Disease Active                       Overview

: Methodi



       c anemia c anemia               4-15                        Formattin st



                                   00:00:                      g of this [facility]osp[facility]



                                   00                          note   l



                                                               might be 



                                                               different 



                                                               from the 



                                                               original. 



                                                               Added  



                                                               automatic 



                                                               ally from 



                                                               request 



                                                               for    



                                                               surgery 



                                                               [id number] 

 

       Acute  Acute  Disease Active                              Methodi



       gallstone gallstone               3-12                               st



       pancreatit pancreatit               00:00:                             Ho

spita



       is     is                   00                                 l

 

       Hypervolem Hypervolem Disease Active                              M

ethodi



       ia     ia                   2-                               st



                                   00:00:                             [facility]osp[facility]



                                   00                                 l

 

       Hypotensio Hypotensio Disease Active 2020                             M

ethodi



       n      n                    2-21                               st



                                   00:00:                             [facility]



                                   00                                 l

 

       Respirator Respirator Disease Active 2020                             M

ethodi



       y failure y failure               1-16                               st



                                   00:00:                             [facility]osp[facility]



                                   00                                 l

 

       Acute  Acute  Disease Active 2020                             Methodi



       cystitis cystitis               1-13                               st



       without without               00:00:                             [facility]



       hematuria hematuria               00                                 l

 

       Class 3 Class 3 Disease Active 2020                             Methodi



       severe severe               0-26                               st



       obesity obesity               00:00:                             [facility]



       without without               00                                 l



       serious serious                                                  



       comorbidit comorbidit                                                  



       y with y with                                                  



       body mass body mass                                                  



       index  index                                                   



       (BMI) of (BMI) of                                                  



       60.0 to 60.0 to                                                  



       69.9 in 69.9 in                                                  



       adult  adult                                                   

 

       Acute  Acute  Disease Active 2020                             Methodi



       renal  renal                0-26                               st



       failure failure               00:00:                             [facility]



       (ARF)  (ARF)                00                                 l

 

       Acute  Acute  Disease Active 2020                             Methodi



       respirator respirator               0-26                               st



       y failure y failure               00:00:                             Hosp

mimi



       with   with                 00                                 l



       hypoxia hypoxia                                                  



       and    and                                                     



       hypercapni hypercapni                                                  



       a      a                                                       

 

       Acute  Acute  Disease Active 2020                             Methodi



       congestive congestive               0-24                               st



       heart  heart                00:00:                             [facility]osp[facility]



       failure failure               00                                 l

 

       Left foot Left foot Disease Active 2019                             Met

hodi



       infection infection               0-15                               st



                                   00:00:                             [facility]ospita



                                   00                                 l

 

       Cellulitis Cellulitis Disease Active                              M

ethodi



       of left of left               9-15                               st



       leg    leg                  00:00:                             [facility]ospita



                                   00                                 l

 

       Bacteremia Bacteremia Disease Active                              M

ethodi



       due to due to               9                               st



       Pseudomona Pseudomona               00:00:                             Ho

spita



       s      s                    00                                 l

 

       COLON   COLON Diagnosis Active 2017               [facility]

[facility]



       CANCER CANCER               24          05:49:00               l



       SCREENING- SCREENING-               00:00:                             He

rmann



       Z12.11 Z12.11               00                                 



              Active                                                  



              2017                                                  



               [facility]                                                  



              [place]                                                    

 

       R92.1 -  R92.1 - Diagnosis Active 2016               

[place]



       MAMMOGRAPH MAMMOGRAPH                         15:55:00               

l



       IC     IC                   00:01:                             [facility]



       CALCIFCN CALCIFCN               00                                 



       FOUND ON FOUND ON                                                  



              Active                                                  



              2016                                                  



               [facility]                                                  



              [place]                                                  

 

       Z12.31 -  Z12.31 - Diagnosis Active 2016             

  [place]



       ENCNTR ENCNTR                         07:08:00               l



       SCREEN SCREEN               00:01:                             [facility]



       MAMMOGRAM MAMMOGRAM               00                                 



       FOR MA FOR MA                                                  



              Active                                                  



              2016                                                  



               [facility]                                                  



              [place]                                                  

 

       RT WRIST  RT WRIST Diagnosis Active 2017             

  [place]



       DISTAL DISTAL               1-          02:03:00               l



       RADIUS RADIUS               09:00:                             [place]



       CLSD FX CLSD FX               00                                 



              Active                                                  



              2016                                                  



               [facility]                                                  



              [facility]                                                  



              [facility]                                                   

 

       Abnormal Abnormal Disease Active                       Overview: Me

thodi



       glucose glucose               7-02                        Formattin st



       level  level                00:00:                      g of this Hospita



                                   00                          note   l



                                                               might be 



                                                               different 



                                                               from the 



                                                               original. 



                                                               Data   



                                                               migrated 



                                                               from [facility] 



                                                               [facility] 



                                                               y on   



                                                               7/8/15.Da 



                                                               ta     



                                                               migrated 



                                                               from [facility] 



                                                               [facility] 



                                                               y on   



                                                               7/8/15.Da 



                                                               ta     



                                                               migrated 



                                                               from [facility] 



                                                               [facility] 



                                                               y on   



                                                               7/8/15. 

 

       Obstructiv Obstructiv Disease Active                       Overview

: Methodi



       e sleep e sleep               2-04                        Formattin st



       apnea  apnea                00:00:                      g of this [facility]osp[facility]



       syndrome syndrome               00                          note   l



                                                               might be 



                                                               different 



                                                               from the 



                                                               original. 



                                                               Data   



                                                               migrated 



                                                               from [facility] 



                                                               [facility] 



                                                               y on   



                                                               5/30/15.D 



                                                               nancie    



                                                               migrated 



                                                               from [facility] 



                                                               [facility] 



                                                               y on   



                                                               5/30/15.D 



                                                               nancie    



                                                               migrated 



                                                               from [facility] 



                                                               [facility] 



                                                               y on   



                                                               5/30/15.D 



                                                               nancie    



                                                               migrated 



                                                               from [facility] 



                                                               [facility] 



                                                               y on   



                                                               5/30/15.D 



                                                               nancie    



                                                               migrated 



                                                               from [facility] 



                                                               [facility] 



                                                               y on   



                                                               5/30/15.D 



                                                               nancie    



                                                               migrated 



                                                               from [facility] 



                                                               [facility] 



                                                               y on   



                                                               5/30/15. 

 

       Lymphedema Lymphedema Disease Active                       Overview

: Methodi



                                   2-04                        Formattin st



                                   00:00:                      g of this [facility]ospita



                                   00                          note   l



                                                               might be 



                                                               different 



                                                               from the 



                                                               original. 



                                                               Data   



                                                               migrated 



                                                               from [facility] 



                                                               [facility] 



                                                               y on   



                                                               5/30/15. 

 

       Depressive Depressive Disease Active                       Overview

: Methodi



       disorder disorder               4-24                        Formattin st



                                   00:00:                      g of this Hospita



                                   00                          note   l



                                                               might be 



                                                               different 



                                                               from the 



                                                               original. 



                                                               Data   



                                                               migrated 



                                                               from [facility] 



                                                               [facility] 



                                                               y on   



                                                               5/30/15.D 



                                                               nancie    



                                                               migrated 



                                                               from [facility] 



                                                               [facility] 



                                                               y on   



                                                               5/30/15.D 



                                                               nancie    



                                                               migrated 



                                                               from [facility] 



                                                               [facility] 



                                                               y on   



                                                               5/30/15.D 



                                                               nancie    



                                                               migrated 



                                                               from [facility] 



                                                               [facility] 



                                                               y on   



                                                               5/30/15. 

 

       Hypothyroi Hypothyroi Disease Active                       Overview

: Methodi



       dism   dism                 4-24                        Formattin st



                                   00:00:                      g of this Hospita



                                   00                          note   l



                                                               might be 



                                                               different 



                                                               from the 



                                                               original. 



                                                               Data   



                                                               migrated 



                                                               from [facility] 



                                                               [facility] 



                                                               y on   



                                                               5/30/15. 

 

       Obesity  Obesity Problem Active 2016               Me

moria



       (disorder) (disorder)               8          00:23:22               

l



              Active               00:00:                             [facility]



              2012               00                                 



              Problem                                                  



              2016                                                  



              Data                                                    



              migrated                                                  



              from [facility]                                                  



              [facility]                                                  



              on                                                      



              5/30/15.                                                  



              [facility]                                                  



              [facility]                                                  



              [facility]                                                  



              [place]                                                  



              [facility]                                                     



              [place]                                                  



              [facility]                                                  



              [facility]                                                  



              Bone &                                                  



              Joint                                                   

 

       Cobalamin Cobalamin Disease Active                       Overview: 

Methodi



       deficiency deficiency               7-11                        Formattin

 st



                                   00:00:                      g of this Hospita



                                   00                          note   l



                                                               might be 



                                                               different 



                                                               from the 



                                                               original. 



                                                               Data   



                                                               migrated 



                                                               from [facility] 



                                                               [facility] 



                                                               y on   



                                                               5/30/15.D 



                                                               nancie    



                                                               migrated 



                                                               from [facility] 



                                                               [facility] 



                                                               y on   



                                                               5/30/15.D 



                                                               nancie    



                                                               migrated 



                                                               from [facility] 



                                                               [facility] 



                                                               y on   



                                                               5/30/15.D 



                                                               nancie    



                                                               migrated 



                                                               from [facility] 



                                                               [facility] 



                                                               y on   



                                                               5/30/15. 

 

       Hypertensi  Hypertens Problem Active -2016            

   [facility]



       ve episode kyle                  7-10          00:23:22               l



       (disorder) episode               00:00:                             [place]

nn



              (disorder)               00                                 



              Active                                                  



              07/10/2012                                                  



              Problem                                                  



              2016                                                  



               Data                                                   



              migrated                                                  



              from [employer]                                                  



              [employer]                                                  



              on                                                      



              5/30/15.                                                  



               [facility]                                                  



              Tessa,                                                  



              [facility] [place]                                                  



              [place],                                                  



              [facility]                                                     



              [place]                                                  



              Trace,[facility]                                                  



              [place]                                                  



              [facility]one &                                                  



              Joint                                                   

 

       Calcificat  Calcifica Problem Resolve               2022           

    [facility]



       ion of tion of        d                    03:11:55               l



       breast breast                                                  [place]



       (finding) (finding)                                                  



              Resolved                                                  



              Problem                                                  



              2022                                                  



              Left                                                   



              Medical                                                  



              Group,                                                  



              [facility]                                                    



              [place],                                                  



              [facility] [place]                                                  



              Land,                                                  



              S[facility]                                                     



              [place]                                                  



              Trace,[facility]                                                  



              [place]                                                  



              [facility]one [facility]                                                  



              Joint,                                                  



              [place]                                                  

 

       Acute    Acute Problem Active               2020               [place]

[place]



       urinary urinary                             22:36:35               l



       tract  tract                                                   [place]



       infection infection                                                  



       (disorder) (disorder)                                                  



              Active                                                  



              Problem                                                  



              2020                                                  



                                                                    



              Medical                                                  



              Group                                                   

 

       Chronic  Chronic Problem Active               2020               Me

moria



       renal  renal                              22:36:35               l



       impairment impairment                                                  He

rmann



       (disorder) (disorder)                                                  



              Active                                                  



              Problem                                                  



              2020                                                  



               Medical                                                  



              Group,                                                  



              [facility]                                                    



              [place],                                                  



              [facility] [place]                                                  



              Land,                                                  



              [facility]                                                     



              [place]                                                  



              Trace,[facility]                                                  



              [place]                                                  



              [facility]one [facility]                                                  



              Joint,                                                  



              [place]                                                  

 

       Benign  Benign Problem Active               2022               [place]

[place]



       essential essential                             03:11:55               l



       hypertensi hypertensi                                                  He

rmann



       on     on                                                      



       (disorder) (disorder)                                                  



              Active                                                  



              Problem                                                  



              2022                                                  



               Medical                                                  



              Group,                                                  



              [facility]                                                    



              [place],                                                  



              [facility] [place]                                                  



              Land,                                                  



              S[facility]                                                     



              [place]                                                  



              [facility]                                                  



              [place]                                                  



              [facility]                                                  



              Joint,                                                  



              [place]                                                  

 

       Cardiorena  Cardioren Problem Active               2022            

   [facility]



       l syndrome al                                 03:11:55               l



       (disorder) syndrome                                                  [place]

daryl



              (disorder)                                                  



              Active                                                  



              Problem                                                  



              2022                                                  



               Medical                                                  



              Group,                                                  



              [facility] [place]                                                  



              Land,                                                  



              [place]                                                  

 

       Chronic  Chronic Problem Active               2022               Me

moria



       kidney kidney                             03:11:55               l



       disease disease                                                  [place]



       (disorder) (disorder)                                                  



              Active                                                  



              Problem                                                  



              2022                                                  



               Medical                                                  



              Group,                                                  



              [facility] [place]                                                  



              [place],                                                  



              [place]                                                  

 

       Chronic  Chronic Problem Active               2022               Me

moria



       kidney kidney                             03:11:55               l



       disease disease                                                  [place]



       stage 3 stage 3                                                  



       (disorder) (disorder)                                                  



              Active                                                  



              Problem                                                  



              2022                                                  



               Medical                                                  



              Group,                                                  



              [facility] [place]                                                  



              [place],                                                  



              [place]                                                  

 

       Chronic  Chronic Problem Active               2022               Me

moria



       pain   pain                               03:11:55               l



       (finding) (finding)                                                  [place]

daryl



              Active                                                  



              Problem                                                  



              2022                                                  



               Medical                                                  



              Group,                                                  



              [facility] [place]                                                  



              [place],                                                  



              [place]                                                  

 

       Dependence  Dependenc Problem Active               2022            

   [facility]



       on     e on                               03:11:55               l



       supplement supplement                                                  He

adrienne



       al oxygen al oxygen                                                  



       (finding) (finding)                                                  



              Active                                                  



              Problem                                                  



              2022                                                  



               Medical                                                  



              Group,                                                  



              [place]                                                  

 

       Edema of  Edema of Problem Active               2022               

[facility]



       lower  lower                              03:11:55               l



       extremity extremity                                                  [place]

daryl



       (finding) (finding)                                                  



              Active                                                  



              Problem                                                  



              2022                                                  



                                                                    



              Medical                                                  



              Group,                                                  



              [facility] [place]                                                  



              [place],                                                  



              [place]                                                  

 

       Hypertensi  Hypertens Problem Active               2022            

   [facility]



       ve     kyle                                03:11:55               l



       disorder, disorder,                                                  [place]

daryl



       systemic systemic                                                  



       arterial arterial                                                  



       (disorder) (disorder)                                                  



              Active                                                  



              Problem                                                  



              2022                                                  



               Medical                                                  



              Group,[facility]                                                  



              ical                                                    



              Specialty                                                  



              Hospital                                                  



              [facility] [place]                                                  



              Land,                                                  



              [facility]                                                  



              Land,                                                  



              [place]                                                  

 

       Hypertensi  Hypertens Problem Active               2022            

   [facility]



       ve renal kyle renal                             03:11:55               l



       disease disease                                                  [place]



       (disorder) (disorder)                                                  



              Active                                                  



              Problem                                                  



              2022                                                  



               Medical                                                  



              Group,                                                  



              [facility] [place]                                                  



              [place],                                                  



              [place]                                                  

 

       Lymphedema  Lymphedem Problem Active               2022            

   [facility]



       of lower a of lower                             03:11:55               l



       extremity extremity                                                  [place]

daryl



       (disorder) (disorder)                                                  



              Active                                                  



              Problem                                                  



              2022                                                  



               Medical                                                  



              Group,                                                  



              [facility]                                                  



              [place],                                                  



              [place]                                                  

 

       Morbid  Morbid Problem Active               2022               [place]

[place]



       obesity obesity                             03:11:55               l



       (disorder) (disorder)                                                  He

rmdaryl



              Active                                                  



              Problem                                                  



              2022                                                  



               Medical                                                  



              Group,                                                  



              [facility]                                                    



              [place],                                                  



              [facility]                                                  



              Land,                                                  



              [facility]                                                     



              [place]                                                  



              Trace,[facility]                                                  



              [place]                                                  



              [facility]                                                  



              Joint,                                                  



              [place]                                                  

 

       Post-disch  Post-disc Problem Active               2022            

   [facility]



       arge   harge                              03:11:55               l



       follow-up follow-up                                                  [place]

daryl



       (finding) (finding)                                                  



              Active                                                  



              Problem                                                  



              2022                                                  



               Medical                                                  



              Group,                                                  



              [place]                                                  



                                                                      

 

       Stasis  Stasis Problem Active               2022               [place]

[place]



       ulcer  ulcer                              03:11:55               l



       (disorder) (disorder)                                                  He

rmann



              Active                                                  



              Problem                                                  



              2022                                                  



               Medical                                                  



              Group,                                                  



              [facility] [place]                                                  



              [place],                                                  



              [place]                                                  

 

       Swelling -  Swelling Problem Active               2022             

  [facility]



       edema - - edema -                             03:11:55               l



       symptom symptom                                                  [place]



       (finding) (finding)                                                  



              Active                                                  



              Problem                                                  



              2022                                                  



               Medical                                                  



              Group,                                                  



              [facility] [place]                                                  



              [place],                                                  



              [place]                                                  

 

       Kidney  Kidney Problem Active               2017               [place]

[place]



       disease disease                             03:07:28               l



       (disorder) (disorder)                                                  He

rmann



              Active                                                  



              Problem                                                  



              2017                                                  



               [place]                                                  



              [facility]                                                    

 

       RIGHT    RIGHT Diagnosis Active               2016               [facility]

moria



       WRIST  WRIST                              15:06:00               l



       DISTAL DISTAL                                                  [place]



       RADIUS RADIUS                                                  



       CLSD FX CLSD FX                                                  



              Active                                                  



              [facility]                                                     



              [place]                                                  



              [facility]one &                                                  



              Joint                                                   

 

       DISTAL  DISTAL Diagnosis Active               2016               [facility]

[place]



       RADIUS RADIUS                             09:46:00               l



       CLSD FX CLSD FX                                                  [place]



              Active [facility]                                                     



              [place]                                                  



              Trace                                                   

 

       Long-term  Long-term Problem Active               2022             

  [facility]



       current current                             03:11:55               l



       use of use of                                                  [place]



       drug   drug                                                    



       therapy therapy                                                  



       (situation (situation                                                  



       )      ) Active                                                  



              Problem                                                  



              2022                                                  



               Medical                                                  



              Group                                                   

 

       Cholestero  Cholester Problem                      2016            

   [facility]



       l      ol                                 05:02:18               l



       (substance (substance                                                  [place]

rmann



       )      ) Problem                                                  



              2016                                                  



              Surgical                                                  



              [facility]                                                    

 

       Sleep    Sleep Problem                      2016               [place]

[place]



       apnea  apnea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                                                    

 

           History [facility] 2021 1                     [facility]



           Housing Places 00:00:00   00:00:00                         Hospital



           Lived                                                  

 

           History [facility] 2021 2                     [facility]



           Housing Homeless 00:00:00   00:00:00                         Hospital



           Last Year                                              

 

           Alcohol Comment 2021-04-15 2021-04-15 rarely                [facility]



                      00:00:00   00:00:00                         Hospital

 

           History [facility] 2021 1                     [facility]



           Alcohol Frequency 00:00:00   00:00:00                         Hospita

l

 

           Social History 2020                       [facility]



                      15:59:50   15:59:50                         

 

           Tobacco use and 2019 Smokeless tobacco            Me

[facility]



           exposure   00:00:00   00:00:00   non-user              Hospital

 

           Sex Assigned At [address]                       [facility]

[facility]



           [facility]









                Smoking Status  Start Date      Stop Date       Source

 

                Social [facility]







Medications







       Ordered Filled Start  Stop   Current Ordering Indication Dosage Frequency

 Signature

                    Comments            Components          Source



     Medication Medication Date Date Medication? Clinician                (SIG) 

          



     Name Name                                                   

 

     brimonidine      2022      Yes            1[drp] Q.5D 1 drop 2           

[facility]



     (ALPHAGAN)      2-07                               (two)           [facility]



     0.15 %      13:19:                               times           Medical



     ophthalmic      20                                 daily.           Center



     solution                                                        

 

     apixaban      2022      Yes            2.5mg Q.5D Take 2.5           [facility]



     (ELIQUIS)      2-07                               mg by           [facility]



     2.5 mg Tab      13:19:                               mouth 2           Medi

tayla



     tablet      20                                 (two)           Center



                                                  times           



                                                  daily.           

 

     bumetanide      2022      Yes            2mg  QD   Take 2 mg           [facility]



     (BUMEX) 2      2-07                               by mouth           [facility]



     MG tablet      13:19:                               daily.           [facility]

l



               20                                                Center

 

     pantoprazol      2022      Yes            40mg QD   Take 40 mg           

[facility]



     e         2-07                               by mouth           [facility]



     (PROTONIX)      13:19:                               daily.           [facility]

al



     40 MG      20                                                Center



     tablet                                                        

 

     midodrine      2022- Yes            5mg       Take 1           [facility]

[facility]



     (PROAMATINE                                tablet (5           [facility]

[facility]



     ) 5 MG      00:00: 23:59                          mg total)           Medic

al



     tablet      00   :00                           by mouth 3           Center



                                                  (three)           



                                                  times           



                                                  daily           



                                                  before           



                                                  meals for           



                                                  60 days.           

 

     digoxin      2022- No             125ug      Take 125           [facility]



     (LANOXIN)                                mcg by           [facility]



     0.125 MG      19:03: 00:00                          mouth 3           Medic

al



     tablet      43   :00                           times per           Center



                                                  week .           

 

     metoprolol      2022 No             25mg Q.5D Take 25 mg           

[facility]



     tartrate                                by mouth 2           [facility]

s



     (LOPRESSOR)      19:03: 00:00                          (two)           Medi

tayla



     25 MG      43   :00                           times           Center



     tablet                                         daily.           

 

     midodrine      2022- No             10mg Q.[id number] Take 10 mg      

     [facility]



     (PROAMATINE                           [id number] by mouth 3      

     [facility]



     ) 10 MG      19:03: 00:00                     3D   (three)           Medica

l



     tablet      43   :00                           times           Center



                                                  daily.           

 

     mINOCYCLine      2022      Yes            100mg      Take 1           [facility]



     (MINOCIN,DY                                     capsule           [facility]



     NACIN) 100      00:00:                               (100 mg           Medi

tayla



     MG capsule      00                                 total) by           Cent

er



                                                  mouth           



                                                  every 12           



                                                  (twelve)           



                                                  hours.           

 

     promethazin      2022- No             12.5mg      Take 1           [facility]

[facility]



     e                                   tablet           [facility]



     (PHENERGAN)      00:00: 23:59                          (12.5 mg           M

edical



     12.5 MG      00   :00                           total) by           Center



     tablet                                         mouth           



                                                  every 6           



                                                  (six)           



                                                  hours as           



                                                  needed for           



                                                  Nausea for           



                                                  up to 15           



                                                  days.           

 

     apixaban      2022- No             2.5mg Q.5D Take 1           [facility]

[facility]



     (ELIQUIS)                                tablet           st



     2.5 mg      10:03: 00:00                          (2.5 mg           Hospita



     tablet      30   :00                           total) by           l



                                                  mouth 2           



                                                  (two)           



                                                  times a           



                                                  day.           

 

     apixaban      2022      Yes            5mg  Q.5D Take 1           [facility]



     (ELIQUIS) 5                                     tablet (5           st



     mg tablet      00:00:                               mg total)           Hos

[place]



               00                                 by mouth 2           l



                                                  (two)           



                                                  times a           



                                                  day.           

 

     atorvastati      2022      Yes            10mg QD   Take 10 mg           

[facility]



     n (LIPITOR)                                     by mouth           st



     10 mg      18:12:                               every           Hospita



     tablet      11                                 evening.           l

 

     sucroferric      2022      Yes            500mg Q.[id number] Chew 500      

     [facility]



     oxyhydroxid                                [id number] mg 3           st



     e         18:12:                          3D   (three)           Hospita



     (Velphoro)      11                                 times a           l



     500 mg                                         day with           



     tablet,chew                                         meals.           



     able                                                        

 

     ondansetron      2022      Yes            4mg  Q8H  Take 4 mg           M

ethodi



     (ZOFRAN) 4                                     by mouth           st



     MG tablet      18:12:                               every 8           Hospi

ta



               11                                 (eight)           l



                                                  hours as           



                                                  needed for           



                                                  nausea or           



                                                  vomiting.           

 

     brimonidine      2022      Yes            1[drp] Q.5D Administer         

  [facility]



     (ALPHAGAN                                     1 drop to           st



     P) 0.1 %      18:12:                               both eyes           Hosp

mimi



     drops      11                                 2 (two)           l



                                                  times a           



                                                  day.           

 

     folic acid      2022      Yes            1mg  QD   Take 1           [facility]

[facility]



     (FOLVITE) 1                                     tablet (1           st



     MG tablet      18:12:                               mg total)           Hos

[place]



               11                                 by mouth           l



                                                  daily.           

 

     pantoprazol      2022- No             40mg QD   Take 40 mg          

 [facility]



     e         06                          by mouth           st



     (PROTONIX)      16:58: 00:00                          daily.           Hosp

mimi



     40 MG EC      50   :00                                          l



     tablet                                                        

 

     pantoprazol      2022      Yes            40mg Q.5D Take 1           [facility]

[facility]



     e                                        tablet (40           st



     (PROTONIX)      00:00:                               mg total)           Ho

spita



     40 MG EC      00                                 by mouth 2           l



     tablet                                         (two)           



                                                  times a           



                                                  day.           

 

     acetaminoph      2022- No        23191 1{tbl} Q.5D Take 1           

[facility]



     en-codeine      9-09 09-20                          tablet by           st



     (TYLENOL      00:00: 04:59                          mouth 2           Hospi

ta



     WITH      00   :00                           (two)           l



     CODEINE #3)                                         times a           



     300-30 mg                                         day for 10           



     per tablet                                         days           



                                                  .chronic           



                                                  pain.           

 

     acetaminoph      2022- No        [id number] 1{tbl} Q.5D Take 1           

[facility]



     en-codeine                                tablet by           st



     (TYLENOL      00:00: 00:00                          mouth 2           Hospi

ta



     WITH      00   :00                           (two)           l



     CODEINE #3)                                         times a           



     300-30 mg                                         day for 10           



     per tablet                                         days           



                                                  .acute           



                                                  pain.           

 

     methocarbam      2022- No             500mg Q.25D Take 500          

 [facility]



     oL                                  mg by           st



     (ROBAXIN)      18:56: 00:00                          mouth 4           Hosp

mimi



     500 MG      38   :00                           (four)           l



     tablet                                         times a           



                                                  day.           

 

     apixaban      2022- No             2.5mg Q.5D Take 2.5           Met

hodi



     (ELIQUIS) 5                                mg by           st



     mg tablet      18:51: 00:00                          mouth 2           Hosp

mimi



               03   :00                           (two)           l



                                                  times a           



                                                  day.           

 

     sevelamer      -2022- No             1600mg Q.[id number] Take 1,600    

       [facility]



     (RENVELA)                           [id number] mg by           st



     800 mg      18:50: 00:00                     3D   mouth 3           Hospita



     tablet      58   :00                           (three)           l



                                                  times a           



                                                  day with           



                                                  meals.           

 

     oxyCODONE      2022- No        [id number] 5mg  Q4H  Take 5 mg           M

ethodi



     (ROXICODONE                                by mouth           st



     ) 5 MG      18:50: 00:00                          every 4           Hospita



     immediate      30   :00                           (four)           l



     release                                         hours as           



     tablet                                         needed for           



                                                  moderate           



                                                  pain           



                                                  .acute           



                                                  pain.           

 

     tiZANidine      -0      Yes            4mg  Q.5D Take 1           [facility]

[facility]



     (ZANAFLEX)                                     tablet (4           st



     4 MG tablet      00:00:                               mg total)           H

ospita



               00                                 by mouth 2           l



                                                  (two)           



                                                  times a           



                                                  day as           



                                                  needed for           



                                                  muscle           



                                                  spasms.           

 

     promethazin      0      Yes            25mg Q6H  Take 1           [facility]

[facility]



     e         -01                               tablet (25           st



     (PHENERGAN)      00:00:                               mg total)           H

ospita



     25 MG      00                                 by mouth           l



     tablet                                         every 6           



                                                  (six)           



                                                  hours as           



                                                  needed for           



                                                  vomiting           



                                                  or nausea.           

 

     promethazin            Yes            50mg Q.[id number] Take 1         

  [facility]



     e         -28                          [id number] tablet (50           st



     (PHENERGAN)      00:00:                          3D   mg total)           H

ospita



     50 MG      00                                 by mouth 3           l



     tablet                                         (three)           



                                                  times a           



                                                  day as           



                                                  needed for           



                                                  nausea or           



                                                  vomiting.           

 

     diclofenac            Yes            75mg Q.5D Take 1           [facility]

[facility]



     (VOLTAREN)      7-26                               tablet (75           st



     75 MG EC      00:00:                               mg total)           Hosp

mimi



     tablet      00                                 by mouth 2           l



                                                  (two)           



                                                  times a           



                                                  day.           

 

     fluconazole            Yes            150mg Q7D  Take 1           Met

hodi



     (DIFLUCAN)      7-25                               tablet           st



     150 MG      00:00:                               (150 mg           Hospita



     tablet      00                                 total) by           l



                                                  mouth once           



                                                  a week. On           



                                                  Monday           

 

     DULoxetine      2022- No             60mg QD   Take 60 mg           

[facility]



     (CYMBALTA)      -                          by mouth           st



     60 MG      18:01: 00:00                          daily.           Hospita



     capsule      54   :00                                          l

 

     gabapentin      2022- No             300mg Q.5D Take 300           M

ethodi



     (NEURONTIN)      -09                          mg by           st



     100 mg      18:01: 00:00                          mouth 2           Hospita



     capsule      54   :00                           (two)           l



                                                  times a           



                                                  day.           

 

     allopurinoL      2022- No             100mg QD   Take 100           

[facility]



     (ZYLOPRIM)      -09                          mg by           st



     100 MG      18:01: 00:00                          mouth           Hospita



     tablet      54   :00                           daily.           l

 

     Zithromax            Yes                      See            [employer]      6-29                               Instructio           l



     mg oral      21:14:                               ns, Take 2           Herm

daryl



     tablet      00                                 tablets by           



                                                  mouth the           



                                                  first day           



                                                  then 1           



                                                  tablet by           



                                                  mouth days           



                                                  2-5. (Pt           



                                                  is on           



                                                  hemodialys           



                                                  is)., X 5           



                                                  day, # 6           



                                                  tab, 0           



                                                  Refill(s),           



                                                  Pharmacy:           



                                                  [facility]            



                                                  Pharmacy           



                                                  [place],           



                                                  170.18,           



                                                  cm,            



                                                  22           



                                                  14:52:00           



                                                  CDT,           



                                                  Height,           



                                                  106.818,           



                                                  kg,            



                                                  22           



                                                  14...           

 

     Zithromax            Yes                      See            [employer] 250      6-29                               Instructio           l



     mg oral      21:14:                               ns, Take 2           Herm

daryl



     tablet      00                                 tablets by           



                                                  mouth the           



                                                  first day           



                                                  then 1           



                                                  tablet by           



                                                  mouth days           



                                                  2-5. (Pt           



                                                  is on           



                                                  hemodialys           



                                                  is)., X 5           



                                                  day, # 6           



                                                  tab, 0           



                                                  Refill(s),           



                                                  Pharmacy:           



                                                  [facility],           



                                                  [id number],           



                                                  cm,            



                                                  22           



                                                  14:52:00           



                                                  CDT,           



                                                  Height,           



                                                  106.818,           



                                                  kg,            



                                                  22           



                                                  14...           

 

     Zithromax      2-0      Yes                      See            [place]



     [facility] 250      6-29                               Instructio           l



     mg oral      21:14:                               ns, Take 2           Herm

daryl



     tablet      00                                 tablets by           



                                                  mouth the           



                                                  first day           



                                                  then 1           



                                                  tablet by           



                                                  mouth days           



                                                  2-5. (Pt           



                                                  is on           



                                                  hemodialys           



                                                  is)., X 5           



                                                  day, # 6           



                                                  tab, 0           



                                                  Refill(s),           



                                                  Pharmacy:           



                                                  [facility],           



                                                  [id number],           



                                                  cm,            



                                                  22           



                                                  14:52:00           



                                                  CDT,           



                                                  Height,           



                                                  106.818,           



                                                  kg,            



                                                  22           



                                                  14...           

 

     Zithromax      -0      Yes                      See            [place]



     Future Ad Labs 250      6-29                               Instructio           l



     mg oral      21:14:                               ns, Take 2           [facility]

[facility]



     tablet      00                                 tablets by           



                                                  mouth the           



                                                  first day           



                                                  then 1           



                                                  tablet by           



                                                  mouth days           



                                                  2-5. (Pt           



                                                  is on           



                                                  hemodialys           



                                                  is)., X 5           



                                                  day, # 6           



                                                  tab, 0           



                                                  Refill(s),           



                                                  Pharmacy:           



                                                  [facility],           



                                                  [id number],           



                                                  cm,            



                                                  22           



                                                  14:52:00           



                                                  CDT,           



                                                  Height,           



                                                  106.818,           



                                                  kg,            



                                                  22           



                                                  14...           

 

     [facility]      [facility]0      [facility]                      [facility]                               In[facility]           l



     mg oral      21:14:                               ns, Take 2           [facility]

[facility]



     tablet      00                                 tablets by           



                                                  mouth the           



                                                  first day           



                                                  then 1           



                                                  tablet by           



                                                  mouth days           



                                                  2-5. (Pt           



                                                  is on           



                                                  hemodialys           



                                                  is)., X 5           



                                                  day, # 6           



                                                  tab, 0           



                                                  Refill(s),           



                                                  Pharmacy:           



                                                  [facility],           



                                                  [id number],           



                                                  cm,            



                                                  22           



                                                  14:52:00           



                                                  CDT,           



                                                  Height,           



                                                  106.818,           



                                                  kg,            



                                                  22           



                                                  14...           

 

     Velphoro      -0      Yes                      500 mg,           [facility]

a



               6                               CHEW,           l



               20:13:                               Daily,           [place]



               00                                 take with           



                                                  food, 0           



                                                  Refill(s)           

 

     Velphoro      2022-0      Yes                      500 mg,           [facility]

a



               6-29                               CHEW,           l



               20:13:                               Daily,           [place]



               00                                 take with           



                                                  food, 0           



                                                  Refill(s)           

 

     Velphoro      2022-0      Yes                      500 mg,           [facility]

a



               6-29                               CHEW,           l



               20:13:                               Daily,           [place]



               00                                 take with           



                                                  food, 0           



                                                  Refill(s)           

 

     Velphoro      2022-0      Yes                      500 mg,           [facility]

a



               6-29                               CHEW,           l



               20:13:                               Daily,           [place]



               00                                 take with           



                                                  food, 0           



                                                  Refill(s)           

 

     Velphoro      2022-0      Yes                      500 mg,           [facility]

a



               6-29                               CHEW,           l



               20:13:                               Daily,           [place]



               00                                 take with           



                                                  food, 0           



                                                  Refill(s)           

 

     sevelamer      2-0      Yes                      2,400 mg =           [facility]

[facility]



     carbonate      6-29                               3 tab, PO,           l



     800 mg oral      20:12:                               TID-Meals,           

[place]



     tablet      00                                 # 270 tab,           



                                                  0              



                                                  Refill(s)           

 

     sevelamer      2-0      Yes                      2,400 mg =           [facility]

[facility]



     carbonate      6-29                               3 tab, PO,           l



     800 mg oral      20:12:                               TID-Meals,           

[place]



     tablet      00                                 # 270 tab,           



                                                  0              



                                                  Refill(s)           

 

     sevelamer      2-0      Yes                      2,400 mg =           [facility]

[facility]



     carbonate      6-29                               3 tab, PO,           l



     800 mg oral      20:12:                               TID-Meals,           

[place]



     tablet      00                                 # 270 tab,           



                                                  0              



                                                  Refill(s)           

 

     sevelamer      2022-0      Yes                      2,400 mg =           [facility]

[facility]



     carbonate      6-29                               3 tab, PO,           l



     800 mg oral      20:12:                               TID-Meals,           

[place]



     tablet      00                                 # 270 tab,           



                                                  0              



                                                  Refill(s)           

 

     sevelamer      2-0      Yes                      2,400 mg =           [facility]

[facility]



     carbonate      6-29                               3 tab, PO,           l



     800 mg oral      20:12:                               TID-Meals,           

[place]



     tablet      00                                 # 270 tab,           



                                                  0              



                                                  Refill(s)           

 

     pantoprazol      2-0      Yes                      40 mg = 1           [facility]

[facility]



     e 40 mg      6-29                               tab, PO,           l



     oral      20:11:                               Daily, 0           [place]



     enteric      00                                 Refill(s)           



     coated                                                        



     tablet                                                        

 

     pantoprazol      2-0      Yes                      40 mg = 1           [facility]

[facility]



     e 40 mg      6-29                               tab, PO,           l



     oral      20:11:                               Daily, 0           [place]



     enteric      00                                 Refill(s)           



     coated                                                        



     tablet                                                        

 

     pantoprazol      -0      Yes                      40 mg = 1           [facility]

[facility]



     e 40 mg      6-29                               tab, PO,           l



     oral      20:11:                               Daily, 0           [place]



     enteric      00                                 Refill(s)           



     coated                                                        



     tablet                                                        

 

     pantoprazol      -0      Yes                      40 mg = 1           [facility]

[facility]



     e 40 mg      6-29                               tab, PO,           l



     oral      20:11:                               Daily, 0           [place]



     enteric      00                                 Refill(s)           



     coated                                                        



     tablet                                                        

 

     pantoprazol      -0      Yes                      40 mg = 1           [facility]

[facility]



     e 40 mg      6-29                               tab, PO,           l



     oral      20:11:                               Daily, 0           [place]



     enteric      00                                 Refill(s)           



     coated                                                        



     tablet                                                        

 

     oxyCODONE 5      -0      Yes                      5 mg = 1           [facility]

[facility]



     mg oral      6-29                               tab, PO,           l



     tablet,      20:10:                               Q4H, PRN           [place]

n



     immediate      00                                 Pain, prn,           



     release                                         0              



                                                  Refill(s)           

 

     oxyCODONE 5      -0      Yes                      5 mg = 1           [facility]

[facility]



     mg oral      6-29                               tab, PO,           l



     tablet,      20:10:                               Q4H, PRN           [place]

n



     immediate      00                                 Pain, prn,           



     release                                         0              



                                                  Refill(s)           

 

     oxyCODONE 5      -0      Yes                      5 mg = 1           [facility]

[facility]



     mg oral      6-29                               tab, PO,           l



     tablet,      20:10:                               Q4H, PRN           [place]

n



     immediate      00                                 Pain, prn,           



     release                                         0              



                                                  Refill(s)           

 

     oxyCODONE 5      -0      Yes                      5 mg = 1           [facility]

[facility]



     mg oral      6-29                               tab, PO,           l



     tablet,      20:10:                               Q4H, PRN           [place]

n



     immediate      00                                 Pain, prn,           



     release                                         0              



                                                  Refill(s)           

 

     oxyCODONE 5      -0      Yes                      5 mg = 1           [facility]

[facility]



     mg oral      6-29                               tab, PO,           l



     tablet,      20:10:                               Q4H, PRN           [place]

n



     immediate      00                                 Pain, prn,           



     release                                         0              



                                                  Refill(s)           

 

     ondansetron      -0      Yes                      4 mg = 1           [facility]

[facility]



     4 mg oral      6-29                               tab, PO,           l



     tablet,      20:09:                               TID, PRN           [place]

n



     disintegrat      00                                 Nausea /           



     ing                                          Vomiting,           



                                                  Dissolve           



                                                  tab under           



                                                  tongue, 0           



                                                  Refill(s)           

 

     ondansetron      2-0      Yes                      4 mg = 1           [facility]

[facility]



     4 mg oral      6-29                               tab, PO,           l



     tablet,      20:09:                               TID, PRN           [place]

n



     disintegrat      00                                 Nausea /           



     ing                                          Vomiting,           



                                                  Dissolve           



                                                  tab under           



                                                  tongue, 0           



                                                  Refill(s)           

 

     ondansetron      2-0      Yes                      4 mg = 1           [facility]

[facility]



     4 mg oral      6-29                               tab, PO,           l



     tablet,      20:09:                               TID, PRN           [place]

n



     disintegrat      00                                 Nausea /           



     ing                                          Vomiting,           



                                                  Dissolve           



                                                  tab under           



                                                  tongue, 0           



                                                  Refill(s)           

 

     ondansetron      2-0      Yes                      4 mg = 1           [facility]

[facility]



     4 mg oral      6-29                               tab, PO,           l



     tablet,      20:09:                               TID, PRN           [place]

n



     disintegrat      00                                 Nausea /           



     ing                                          Vomiting,           



                                                  Dissolve           



                                                  tab under           



                                                  tongue, 0           



                                                  Refill(s)           

 

     ondansetron      -0      Yes                      4 mg = 1           [facility]

[facility]



     4 mg oral      6-29                               tab, PO,           l



     tablet,      20:09:                               TID, PRN           [place]

n



     disintegrat      00                                 Nausea /           



     ing                                          Vomiting,           



                                                  Dissolve           



                                                  tab under           



                                                  tongue, 0           



                                                  Refill(s)           

 

     Nitazoxanid      -0      Yes                      500 mg = 1           

[facility]



     e 500 mg      6-29                               tab, PO,           l



     oral tablet      20:08:                               Q12H, 0           [facility]

[facility]



               00                                 Refill(s)           

 

     Nitazoxanid      -0      Yes                      500 mg = 1           

[facility]



     e 500 mg      6-29                               tab, PO,           l



     oral tablet      20:08:                               Q12H, 0           [facility]

[facility]



               00                                 Refill(s)           

 

     Nitazoxanid      -0      Yes                      500 mg = 1           

[facility]



     e 500 mg      6-29                               tab, PO,           l



     oral tablet      20:08:                               Q12H, 0           [facility]

[facility]



               00                                 Refill(s)           

 

     Nitazoxanid      2-0      Yes                      500 mg = 1           

[facility]



     e 500 mg      6-29                               tab, PO,           l



     oral tablet      20:08:                               Q12H, 0           [facility]

[facility]



               00                                 Refill(s)           

 

     Nitazoxanid      2-0      Yes                      500 mg = 1           

[facility]



     e 500 mg      6-29                               tab, PO,           l



     oral tablet      20:08:                               Q12H, 0           [facility]

[facility]



               00                                 Refill(s)           

 

     metoprolol      -0      Yes                      25 mg = 1           [facility]

[facility]



     tartrate 25      6-29                               tab, PO,           l



     mg oral      20:07:                               BID, 0           [place]



     tablet      00                                 Refill(s)           

 

     metoprolol      -0      Yes                      25 mg = 1           [facility]

[facility]



     tartrate 25      6-29                               tab, PO,           l



     mg oral      20:07:                               BID, 0           [place]



     tablet      00                                 Refill(s)           

 

     metoprolol      -0      Yes                      25 mg = 1           [facility]

[facility]



     tartrate 25      6-29                               tab, PO,           l



     mg oral      20:07:                               BID, 0           [place]



     tablet      00                                 Refill(s)           

 

     metoprolol      -0      Yes                      25 mg = 1           [facility]

[facility]



     tartrate 25      6-29                               tab, PO,           l



     mg oral      20:07:                               BID, 0           [place]



     tablet      00                                 Refill(s)           

 

     metoprolol      -0      Yes                      25 mg = 1           [facility]

[facility]



     tartrate 25      6-29                               tab, PO,           l



     mg oral      20:07:                               BID, 0           [place]



     tablet      00                                 Refill(s)           

 

     methocarbam      -0      Yes                      250 mg =           [facility]

[facility]



     ol 500 mg      6-29                               0.5 tab,           l



     oral tablet      20:05:                               PO, TID, 0           

[place]



               00                                 Refill(s)           

 

     methocarbam      -0      Yes                      250 mg =           [facility]

[facility]



     ol 500 mg      6-29                               0.5 tab,           l



     oral tablet      20:05:                               PO, TID, 0           

[place]



               00                                 Refill(s)           

 

     methocarbam      -0      Yes                      250 mg =           [facility]

[facility]



     ol 500 mg      6-29                               0.5 tab,           l



     oral tablet      20:05:                               PO, TID, 0           

[place]



               00                                 Refill(s)           

 

     methocarbam      -0      Yes                      250 mg =           [facility]

[facility]



     ol 500 mg      6-29                               0.5 tab,           l



     oral tablet      20:05:                               PO, TID, 0           

[place]



               00                                 Refill(s)           

 

     methocarbam      -0      Yes                      250 mg =           [facility]

[facility]



     ol 500 mg      6-29                               0.5 tab,           l



     oral tablet      20:05:                               PO, TID, 0           

[place]



               00                                 Refill(s)           

 

     Alphagan P      -0      Yes                      1 drp,           [facility]

[facility]



     0.1%                                     OPTH, Q12H           l



     ophthalmic      20:04:                                              [place]



     solution      00                                                

 

     folic acid      -0      Yes                      1 mg, PO,           [facility]

[facility]



               -                               Daily, 0           l



               20:04:                               Refill(s)           [place]



               00                                                

 

     Alphagan P      2022-0      Yes                      1 drp,           [facility]

[facility]



     0.1%      -                               OPTH, Q12H           l



     ophthalmic      20:04:                                              [place]



     solution      00                                                

 

     folic acid      0      Yes                      1 mg, PO,           [facility]

[facility]



               6-                               Daily, 0           l



               20:04:                               Refill(s)           [place]



                                                               

 

     Alphagan P      0      Yes                      1 drp,           [facility]

[facility]



     0.1%      -                               OPTH, Q12H           l



     ophthalmic      20:04:                                              [place]



     solution      00                                                

 

     folic acid      0      Yes                      1 mg, PO,           [facility]

[facility]



               6-                               Daily, 0           l



               20:04:                               Refill(s)           [place]



                                                               

 

     Alphagan P            Yes                      1 drp,           [facility]

[facility]



     0.1%      -                               OPTH, Q12H           l



     ophthalmic      20:04:                                              [place]



     solution      00                                                

 

     folic acid      0      Yes                      1 mg, PO,           [facility]

[facility]



               -                               Daily, 0           l



               20:04:                               Refill(s)           [place]



                                                               

 

     Alphagan P            Yes                      1 drp,           [facility]

[facility]



     0.1%                                     OPTH, Q12H           l



     ophthalmic      20:04:                                              [place]



     solution      00                                                

 

     folic acid      0      Yes                      1 mg, PO,           [facility]

[facility]



               -                               Daily, 0           l



               20:04:                               Refill(s)           [place]



               00                                                

 

     Oxygen      -0      Yes                      1 btl,           [facility]



               6-                               MISC,           l



               20:03:                               Daily, 2           [place]



               00                                 liters, 0           



                                                  Refill(s)           

 

     Oxygen      -0      Yes                      1 btl,           [facility]



               -                               MISC,           l



               20:03:                               Daily, 2           [facility]



               00                                 liters, 0           



                                                  Refill(s)           

 

     Oxygen      -0      Yes                      1 btl,           [facility]



               6-                               MISC,           l



               20:03:                               Daily, 2           [facility]



               00                                 liters, 0           



                                                  Refill(s)           

 

     Oxygen      -0      Yes                      1 btl,           [facility]



               -                               MISC,           l



               20:03:                               Daily, 2           [facility]



               00                                 liters, 0           



                                                  Refill(s)           

 

     Oxygen      -0      Yes                      1 btl,           [facility]



               6-                               MISC,           l



               20:03:                               Daily, 2           [place]



               00                                 liters, 0           



                                                  Refill(s)           

 

     Eliquis 2.5      0      Yes                      2.5 mg,           [facility]

[facility]



     mg oral      -                               PO, Q12H,           l



     tablet      20:02:                               tab, 0           [place]



               00                                 Refill(s)           

 

     Eliquis 2.5      -0      Yes                      2.5 mg,           [facility]

[facility]



     mg oral      6-29                               PO, Q12H,           l



     tablet      20:02:                               tab, 0           [place]



               00                                 Refill(s)           

 

     Eliquis 2.5      -0      Yes                      2.5 mg,           [facility]

[facility]



     mg oral      6-29                               PO, Q12H,           l



     tablet      20:02:                               tab, 0           [place]



               00                                 Refill(s)           

 

     Eliquis 2.5      -0      Yes                      2.5 mg,           [facility]

[facility]



     mg oral      6-29                               PO, Q12H,           l



     tablet      20:02:                               tab, 0           [facility]



               00                                 Refill(s)           

 

     Eliquis 2.5      -0      Yes                      2.5 mg,           [facility]

[facility]



     mg oral      6-29                               PO, Q12H,           l



     tablet      20:02:                               tab, 0           [place]



               00                                 Refill(s)           

 

     doxycycline      2022- No             200mg Q.5D Take 200           

[facility]



     (VIBRAMYCIN      -25                          mg by           



     ) 100 MG      13:07: 00:00                          mouth 2           [facility]

ta



     capsule      01   :00                           (two)           l



                                                  times a           



                                                  day. Take           



                                                  200mg (x2           



                                                  100mg           



                                                  capsules).           



                                                  Per            



                                                  patient           



                                                  started           



                                                  taking           



                                                  2022           

 

     glucosam/ch      2022- No             1{tbl} Q.5D Take 1           [facility]

[facility]



     on-msm1/C/m                                tablet by           



     ang/evelia      13:07: 00:00                          mouth 2           [facility]

ta



     (OSTEO      01   :00                           (two)           l



     BI-FLEX                                         times a           



     TRIPLE                                         day.           



     STRENGTH                                                        



     ORAL)                                                        

 

     NON       2022- No             1{capsu QD   Take 1           [facility]



     FORMULARY       06-25                le}       capsule by           



               13:07: 00:00                          mouth           [facility]



               01   :00                           nightly.           l



                                                  Patient           



                                                  takes           



                                                  young           



                                                  living           



                                                  probiotic           



                                                  with 17           



                                                  billion           



                                                  active           



                                                  cultures           

 

     folic acid      2022- No             1mg  QD   Take 1           [facility]

[facility]



     (FOLVITE) 1      -                          tablet (1           st



     MG tablet      00:00: 04:59                          mg total)           [facility]

[facility]



               00   :00                           by mouth           l



                                                  daily for           



                                                  30 days.           

 

     methocarbam      2022- No             250mg [id number] Take 0.5     

      [facility]



     oL                             [id number] tablets           st



     (ROBAXIN)      00:00: 04:59                     3D   (250 mg           [facility]

[facility]



     500 MG      00   :00                           total) by           l



     tablet                                         mouth 3           



                                                  (three)           



                                                  times a           



                                                  day for 7           



                                                  days.           

 

     nitazoxanid      2022- No             500mg Q.5D Take 1           [facility]

[facility]



     e (ALINIA)                                tablet           st



     500 MG      00:00: 04:59                          (500 mg           [facility]



     tablet      00   :00                           total) by           l



                                                  mouth 2           



                                                  (two)           



                                                  times a           



                                                  day for 7           



                                                  days.           

 

     oxyCODONE      2022- No        [id number] 5mg  Q4H  Take 1           [facility]

[facility]



     (ROXICODONE                                tablet (5           st



     ) 5 MG      00:00: 04:59                          mg total)           [facility]

ta



     immediate      00   :00                           by mouth           l



     release                                         every 4           



     tablet                                         (four)           



                                                  hours as           



                                                  needed for           



                                                  severe           



                                                  pain for           



                                                  up to 20           



                                                  doses           



                                                  .acute           



                                                  pain. Max           



                                                  Daily           



                                                  Amount: 30           



                                                  mg             

 

     tizanidine            Yes                      4 mg = 1           [facility]

[facility]



     4 mg oral      5-16                               tab, PO,           l



     tablet      18:09:                               Bedtime,           [facility]



               00                                 PRN AS           



                                                  NEEDED FOR           



                                                  MUSCLE           



                                                  SPASM, #           



                                                  15 tab, 0           



                                                  Refill(s),           



                                                  Pharmacy:           



                                                  [facility]           



                                                  #[id number],           



                                                  165.1, cm,           



                                                  21           



                                                  15:23:00           



                                                  CST,           



                                                  Height,           



                                                  107.301,           



                                                  kg,            



                                                  21           



                                                  15:23:00           



                                                  CST,           



                                                  Weight           

 

     tizanidine      -0      Yes                      4 mg = 1           [facility]

[facility]



     4 mg oral      5-16                               tab, PO,           l



     tablet      18:09:                               Bedtime,           [facility]



               00                                 PRN AS           



                                                  NEEDED FOR           



                                                  MUSCLE           



                                                  SPASM, #           



                                                  15 tab, 0           



                                                  Refill(s),           



                                                  Pharmacy:           



                                                  [facility]           



                                                  #[id number],           



                                                  165.1, cm,           



                                                  21           



                                                  15:23:00           



                                                  CST,           



                                                  Height,           



                                                  107.301,           



                                                  kg,            



                                                  21           



                                                  15:23:00           



                                                  CST,           



                                                  Weight           

 

     tizanidine      -0      Yes                      4 mg = 1           [facility]

[facility]



     4 mg oral      5-16                               tab, PO,           l



     tablet      18:09:                               Bedtime,           [facility]



               00                                 PRN AS           



                                                  NEEDED FOR           



                                                  MUSCLE           



                                                  SPASM, #           



                                                  15 tab, 0           



                                                  Refill(s),           



                                                  Pharmacy:           



                                                  [facility]           



                                                  #[id number],           



                                                  165.1, cm,           



                                                  21           



                                                  15:23:00           



                                                  CST,           



                                                  Height,           



                                                  107.301,           



                                                  kg,            



                                                  21           



                                                  15:23:00           



                                                  CST,           



                                                  Weight           

 

     tizanidine            Yes                      4 mg = 1           [facility]

[facility]



     4 mg oral      5-16                               tab, PO,           l



     tablet      18:09:                               Bedtime,           [facility]



               00                                 PRN AS           



                                                  NEEDED FOR           



                                                  MUSCLE           



                                                  SPASM, #           



                                                  15 tab, 0           



                                                  Refill(s),           



                                                  Pharmacy:           



                                                  [facility]           



                                                  [facility]           



                                                  #[id number],           



                                                  165.1, cm,           



                                                  21           



                                                  15:23:00           



                                                  CST,           



                                                  Height,           



                                                  107.301,           



                                                  kg,            



                                                  21           



                                                  15:23:00           



                                                  CST,           



                                                  Weight           

 

     tizanidine            Yes                      4 mg = 1           [facility]

[facility]



     4 mg oral      5-16                               tab, PO,           l



     tablet      18:09:                               Bedtime,           [facility]



               00                                 PRN AS           



                                                  NEEDED FOR           



                                                  MUSCLE           



                                                  SPASM, #           



                                                  15 tab, 0           



                                                  Refill(s),           



                                                  Pharmacy:           



                                                  [facility]           



                                                  #[id number],           



                                                  165.1, cm,           



                                                  21           



                                                  15:23:00           



                                                  CST,           



                                                  Height,           



                                                  107.301,           



                                                  kg,            



                                                  21           



                                                  15:23:00           



                                                  CST,           



                                                  Weight           

 

     nitrofurant      2022- No             100mg Q.5D Take 1           [facility]

[facility]



     oin,      3-11 03-15                          capsule           st



     macrocrysta      00:00: 04:59                          (100 mg           [facility]

[facility]



     l-monohydra      00   :00                           total) by           l



     te,                                          mouth 2           



     (MACROBID)                                         (two)           



     100 MG                                         times a           



     capsule                                         day for 3           



                                                  days.           

 

     lidocaine      2022- No             1{patch Q24H Place 1           [facility]

[facility]



     (LIDODERM)      3-09 04-09                }         patch on           st



     5 %       00:00: 04:59                          the skin           [facility]



               00   :00                           in the           l



                                                  morning           



                                                  for 30           



                                                  days.           



                                                  Remove &           



                                                  Discard           



                                                  patch           



                                                  within 12           



                                                  hours or           



                                                  as             



                                                  directed           



                                                  by MD.           

 

     traMADoL      2022- No        [id number] 50mg [id number] Take 1          

 [facility]



     (ULTRAM) 50      3-09 03-20                     [id number] tablet (50      

     st



     mg tablet      00:00: 04:59                     3D   mg total)           [facility]

[facility]



               00   :00                           by mouth           l



                                                  as needed           



                                                  in the           



                                                  morning           



                                                  and 1           



                                                  tablet (50           



                                                  mg total)           



                                                  as needed           



                                                  at noon           



                                                  and 1           



                                                  tablet (50           



                                                  mg total)           



                                                  as needed           



                                                  in the           



                                                  evening           



                                                  for            



                                                  moderate           



                                                  pain. Do           



                                                  all this           



                                                  for up to           



                                                  10             



                                                  days.acute           



                                                  pain.           

 

     metoprolol      2-0      Yes            25mg Q.5D Take 25 mg           [facility]

[facility]



     tartrate      2-21                               by mouth 2           st



     (LOPRESSOR)      00:00:                               (two)           [facility]

ta



     25 mg      00                                 times a           l



     tablet                                         day.           

 

     Ondansetron      2022-0      Yes                      4 mg = 1           [facility]

[facility]



     4 MG Oral      1-07                               tab, PO,           l



     Tablet      22:01:                               BID, X 5           [place]



     [Zofran]                                       day, # 10           



                                                  tab, 0           



                                                  Refill(s),           



                                                  Pharmacy:           



                                                  [facility]           



                                                  #[id number],           



                                                  165.1, cm,           



                                                  21           



                                                  15:23:00           



                                                  CST,           



                                                  Height,           



                                                  107.301,           



                                                  kg,            



                                                  21           



                                                  15:23:00           



                                                  CST,           



                                                  Weight           

 

     Ondansetron      2022-0      Yes                      4 mg = 1           Me

[facility]



     4 MG Oral      1-07                               tab, PO,           l



     Tablet      22:01:                               BID, X 5           [place]



     [Zofran]                                       day, # 10           



                                                  tab, 0           



                                                  Refill(s),           



                                                  Pharmacy:           



                                                  [facility]           



                                                  #[id number],           



                                                  165.1, cm,           



                                                  21           



                                                  15:23:00           



                                                  CST,           



                                                  Height,           



                                                  107.301,           



                                                  kg,            



                                                  21           



                                                  15:23:00           



                                                  CST,           



                                                  Weight           

 

     Ondansetron      2022-0      Yes                      4 mg = 1           Me

[facility]



     4 MG Oral      1-07                               tab, PO,           l



     Tablet      22:01:                               BID, X 5           [place]



     [Zofran]                                       day, # 10           



                                                  tab, 0           



                                                  Refill(s),           



                                                  Pharmacy:           



                                                  [facility]           



                                                  #[id number],           



                                                  165.1, cm,           



                                                  21           



                                                  15:23:00           



                                                  CST,           



                                                  Height,           



                                                  107.301,           



                                                  kg,            



                                                  21           



                                                  15:23:00           



                                                  CST,           



                                                  Weight           

 

     Ondansetron      2022-0      Yes                      4 mg = 1           Me

[facility]



     4 MG Oral      1-07                               tab, PO,           l



     Tablet      22:01:                               BID, X 5           [place]



     [Zofran]                                       day, # 10           



                                                  tab, 0           



                                                  Refill(s),           



                                                  Pharmacy:           



                                                  [facility]           



                                                  #[id number],           



                                                  165.1, cm,           



                                                  21           



                                                  15:23:00           



                                                  CST,           



                                                  Height,           



                                                  107.301,           



                                                  kg,            



                                                  21           



                                                  15:23:00           



                                                  CST,           



                                                  Weight           

 

     Ondansetron            Yes                      4 mg = 1           Me

[facility]



     4 MG Oral      1-07                               tab, PO,           l



     Tablet      22:01:                               BID, X 5           [place]



     [Zofran]      00                                 day, # 10           



                                                  tab, 0           



                                                  Refill(s),           



                                                  Pharmacy:           



                                                  [facility]           



                                                  [facility]           



                                                  #[id number],           



                                                  165.1, cm,           



                                                  21           



                                                  15:23:00           



                                                  CST,           



                                                  Height,           



                                                  107.301,           



                                                  kg,            



                                                  21           



                                                  15:23:00           



                                                  CST,           



                                                  Weight           

 

     atorvastati      2021      Yes                      10 mg = 1           M

[facility]



     n 10 mg      2-27                               tab, PO,           l



     oral tablet      21:45:                               Bedtime, #           

[place]



               00                                 90 tab, 0           



                                                  Refill(s),           



                                                  Pharmacy:           



                                                  [facility]           



                                                  [facility]           



                                                  #[id number],           



                                                  165.1, cm,           



                                                  21           



                                                  15:23:00           



                                                  CST,           



                                                  Height,           



                                                  107.301,           



                                                  kg,            



                                                  21           



                                                  15:23:00           



                                                  CST,           



                                                  Weight           

 

     atorvastati      2021      Yes                      10 mg = 1           M

[facility]



     n 10 mg      2-27                               tab, PO,           l



     oral tablet      21:45:                               Bedtime, #           

[place]



               00                                 90 tab, 0           



                                                  Refill(s),           



                                                  Pharmacy:           



                                                  [facility]           



                                                  [facility]           



                                                  #[id number],           



                                                  165.1, cm,           



                                                  21           



                                                  15:23:00           



                                                  CST,           



                                                  Height,           



                                                  107.301,           



                                                  kg,            



                                                  21           



                                                  15:23:00           



                                                  CST,           



                                                  Weight           

 

     atorvastati      2021      Yes                      10 mg = 1           M

[facility]



     n 10 mg      2-27                               tab, PO,           l



     oral tablet      21:45:                               Bedtime, #           

[place]



               00                                 90 tab, 0           



                                                  Refill(s),           



                                                  Pharmacy:           



                                                  [facility]           



                                                  [facility]           



                                                  #[id number],           



                                                  165.1, cm,           



                                                  21           



                                                  15:23:00           



                                                  CST,           



                                                  Height,           



                                                  107.301,           



                                                  kg,            



                                                  21           



                                                  15:23:00           



                                                  CST,           



                                                  Weight           

 

     atorvastati      2021      Yes                      10 mg = 1           M

[facility]



     n 10 mg      2-27                               tab, PO,           l



     oral tablet      21:45:                               Bedtime, #           

[place]



               00                                 90 tab, 0           



                                                  Refill(s),           



                                                  Pharmacy:           



                                                  [facility]           



                                                  #[id number],           



                                                  165.1, cm,           



                                                  21           



                                                  15:23:00           



                                                  CST,           



                                                  Height,           



                                                  107.301,           



                                                  kg,            



                                                  21           



                                                  15:23:00           



                                                  CST,           



                                                  Weight           

 

     atorvastati      2021      Yes                      10 mg = 1           M

[facility]



     n 10 mg      2-27                               tab, PO,           l



     oral tablet      21:45:                               Bedtime, #           

[place]



               00                                 90 tab, 0           



                                                  Refill(s),           



                                                  Pharmacy:           



                                                  [facility]           



                                                  #[id number],           



                                                  165.1, cm,           



                                                  21           



                                                  15:23:00           



                                                  CST,           



                                                  Height,           



                                                  107.301,           



                                                  kg,            



                                                  21           



                                                  15:23:00           



                                                  CST,           



                                                  Weight           

 

     tizanidine      2021      Yes                      4 mg = 1           [facility]

[facility]



     4 mg oral      2-27                               tab, PO,           l



     tablet      21:40:                               Bedtime,           [place]



               00                                 PRN for           



                                                  muscle           



                                                  spasm, #           



                                                  30 tab, 0           



                                                  Refill(s),           



                                                  Pharmacy:           



                                                  [facility]           



                                                  #[id number],           



                                                  165.1, cm,           



                                                  21           



                                                  15:23:00           



                                                  CST,           



                                                  Height,           



                                                  107.301,           



                                                  kg,            



                                                  21           



                                                  15:23:00           



                                                  CST,           



                                                  Weight           

 

     Acetaminoph      2021      Yes                      1 tab, PO,           

[facility]



     en 325 MG /      2-27                               Q12H, PRN           l



     tramadol      21:40:                               for pain,           [facility]

[facility]



     hydrochlori      00                                 X 15 day,           



     de 37.5 MG                                         # 30 tab,           



     Oral Tablet                                         0              



     [Ultracet]                                         Refill(s),           



                                                  Pharmacy:           



                                                  [facility]           



                                                  #[id number],           



                                                  165.1, cm,           



                                                  21           



                                                  15:23:00           



                                                  CST,           



                                                  Height,           



                                                  107.301,           



                                                  kg,            



                                                  21           



                                                  15:23:00           



                                                  CST,           



                                                  Weight           

 

     tizanidine      2021      Yes                      4 mg = 1           [facility]

[facility]



     4 mg oral      2-27                               tab, PO,           l



     tablet      21:40:                               Bedtime,           [place]



               00                                 PRN for           



                                                  muscle           



                                                  spasm, #           



                                                  30 tab, 0           



                                                  Refill(s),           



                                                  Pharmacy:           



                                                  [facility]           



                                                  #[id number],           



                                                  165.1, cm,           



                                                  21           



                                                  15:23:00           



                                                  CST,           



                                                  Height,           



                                                  107.301,           



                                                  kg,            



                                                  21           



                                                  15:23:00           



                                                  CST,           



                                                  Weight           

 

     Acetaminoph      2021      Yes                      1 tab, PO,           

[facility]



     en 325 MG /      2-27                               Q12H, PRN           l



     tramadol      21:40:                               for pain,           [facility]

[facility]



     hydrochlori      00                                 X 15 day,           



     de 37.5 MG                                         # 30 tab,           



     Oral Tablet                                         0              



     [Ultracet]                                         Refill(s),           



                                                  Pharmacy:           



                                                  [facility]           



                                                  #[id number],           



                                                  165.1, cm,           



                                                  21           



                                                  15:23:00           



                                                  CST,           



                                                  Height,           



                                                  107.301,           



                                                  kg,            



                                                  21           



                                                  15:23:00           



                                                  CST,           



                                                  Weight           

 

     tizanidine      2021      Yes                      4 mg = 1           [facility]

[facility]



     4 mg oral      2-27                               tab, PO,           l



     tablet      21:40:                               Bedtime,           [place]



               00                                 PRN for           



                                                  muscle           



                                                  spasm, #           



                                                  30 tab, 0           



                                                  Refill(s),           



                                                  Pharmacy:           



                                                  [facility]           



                                                  #[id number],           



                                                  165.1, cm,           



                                                  21           



                                                  15:23:00           



                                                  CST,           



                                                  Height,           



                                                  107.301,           



                                                  kg,            



                                                  21           



                                                  15:23:00           



                                                  CST,           



                                                  Weight           

 

     Acetaminoph      2021      Yes                      1 tab, PO,           

[facility]



     en 325 MG /      2-27                               Q12H, PRN           l



     tramadol      21:40:                               for pain,           [facility]

[facility]



     hydrochlori      00                                 X 15 day,           



     de 37.5 MG                                         # 30 tab,           



     Oral Tablet                                         0              



     [Ultracet]                                         Refill(s),           



                                                  Pharmacy:           



                                                  [facility]           



                                                  #[id number],           



                                                  165.1, cm,           



                                                  21           



                                                  15:23:00           



                                                  CST,           



                                                  Height,           



                                                  107.301,           



                                                  kg,            



                                                  21           



                                                  15:23:00           



                                                  CST,           



                                                  Weight           

 

     tizanidine      2021      Yes                      4 mg = 1           [facility]

[facility]



     4 mg oral      2-27                               tab, PO,           l



     tablet      21:40:                               Bedtime,           [place]



               00                                 PRN for           



                                                  muscle           



                                                  spasm, #           



                                                  30 tab, 0           



                                                  Refill(s),           



                                                  Pharmacy:           



                                                  [facility]           



                                                  #[id number],           



                                                  165.1, cm,           



                                                  21           



                                                  15:23:00           



                                                  CST,           



                                                  Height,           



                                                  107.301,           



                                                  kg,            



                                                  21           



                                                  15:23:00           



                                                  CST,           



                                                  Weight           

 

     Acetaminoph      2021      Yes                      1 tab, PO,           

[facility]



     en 325 MG /      2-27                               Q12H, PRN           l



     tramadol      21:40:                               for pain,           [facility]

[facility]



     hydrochlori      00                                 X 15 day,           



     de 37.5 MG                                         # 30 tab,           



     Oral Tablet                                         0              



     [Ultracet]                                         Refill(s),           



                                                  Pharmacy:           



                                                  [facility]           



                                                  #[id number],           



                                                  165.1, cm,           



                                                  21           



                                                  15:23:00           



                                                  CST,           



                                                  Height,           



                                                  107.301,           



                                                  kg,            



                                                  21           



                                                  15:23:00           



                                                  CST,           



                                                  Weight           

 

     tizanidine      2021      Yes                      4 mg = 1           [facility]

[facility]



     4 mg oral      2-27                               tab, PO,           l



     tablet      21:40:                               Bedtime,           [place]



               00                                 PRN for           



                                                  muscle           



                                                  spasm, #           



                                                  30 tab, 0           



                                                  Refill(s),           



                                                  Pharmacy:           



                                                  [facility]           



                                                  #[id number],           



                                                  165.1, cm,           



                                                  21           



                                                  15:23:00           



                                                  CST,           



                                                  Height,           



                                                  107.301,           



                                                  kg,            



                                                  21           



                                                  15:23:00           



                                                  CST,           



                                                  Weight           

 

     Acetaminoph      2021      Yes                      1 tab, PO,           

[facility]



     en 325 MG /      2-27                               Q12H, PRN           l



     tramadol      21:40:                               for pain,           [facility]

[facility]



     hydrochlori      00                                 X 15 day,           



     de 37.5 MG                                         # 30 tab,           



     Oral Tablet                                         0              



     [Ultracet]                                         Refill(s),           



                                                  Pharmacy:           



                                                  [facility]           



                                                  #[id number],           



                                                  165.1, cm,           



                                                  21           



                                                  15:23:00           



                                                  CST,           



                                                  Height,           



                                                  107.301,           



                                                  kg,            



                                                  21           



                                                  15:23:00           



                                                  CST,           



                                                  Weight           

 

     sevelamer      2021      Yes                      1,600 mg =           [facility]

[facility]



     800 mg oral      2-27                               2 tab, PO,           l



     tablet      21:21:                               TID-Meals,           [facility]

nn



               00                                 # 180 tab,           



                                                  0              



                                                  Refill(s)           

 

     sevelamer      2021      Yes                      1,600 mg =           Me

[facility]



     800 mg oral      2-27                               2 tab, PO,           l



     tablet      21:21:                               TID-Meals,           [facility]

[facility]



               00                                 # 180 tab,           



                                                  0              



                                                  Refill(s)           

 

     sevelamer      2021      Yes                      1,600 mg =           [facility]

[facility]



     800 mg oral      2-27                               2 tab, PO,           l



     tablet      21:21:                               TID-Meals,           [facility]

[facility]



               00                                 # 180 tab,           



                                                  0              



                                                  Refill(s)           

 

     sevelamer      2021      Yes                      1,600 mg =           [facility]

[facility]



     800 mg oral      2-27                               2 tab, PO,           l



     tablet      21:21:                               TID-Meals,           [facility]

[facility]



               00                                 # 180 tab,           



                                                  0              



                                                  Refill(s)           

 

     sevelamer      2021      Yes                      1,600 mg =           Me

[facility]



     800 mg oral      2-27                               2 tab, PO,           l



     tablet      21:21:                               TID-Meals,           [facility]

[facility]



               00                                 # 180 tab,           



                                                  0              



                                                  Refill(s)           

 

     Mupirocin      2021      Yes                      1 appl,           [facility]

ia



     0.02 MG/MG      1-22                               TOP, TID,           l



     Topical      23:21:                               X 5 day, #           [facility]

[facility]



     Ointment      00                                 22 gm, 0           



                                                  Refill(s),           



                                                  Pharmacy:           



                                                  [facility]           



                                                  [facility]           



                                                  #[id number],           



                                                  165.1, cm,           



                                                  21           



                                                  14:08:00           



                                                  CST,           



                                                  Height,           



                                                  136.42,           



                                                  kg,            



                                                  21           



                                                  14:08:00           



                                                  CST,           



                                                  Weight           

 

     Mupirocin      2021      Yes                      1 appl,           [facility]

[facility]



     0.02 MG/MG      1-22                               TOP, TID,           l



     Topical      23:21:                               X 5 day, #           [facility]

d[facility]



     Ointment      00                                 22 gm, 0           



                                                  Refill(s),           



                                                  Pharmacy:           



                                                  [facility]           



                                                  [facility]           



                                                  #[id number],           



                                                  165.1, cm,           



                                                  21           



                                                  14:08:00           



                                                  CST,           



                                                  Height,           



                                                  136.42,           



                                                  kg,            



                                                  21           



                                                  14:08:00           



                                                  CST,           



                                                  Weight           

 

     Mupirocin      2021      Yes                      1 appl,           [facility]

[facility]



     0.02 MG/MG      1-22                               TOP, TID,           l



     Topical      23:21:                               X 5 day, #           [facility]

[facility]



     Ointment      00                                 22 gm, 0           



                                                  Refill(s),           



                                                  Pharmacy:           



                                                  [facility]           



                                                  [facility] [place]           



                                                  #[id number],           



                                                  165.1, cm,           



                                                  21           



                                                  14:08:00           



                                                  CST,           



                                                  Height,           



                                                  136.42,           



                                                  kg,            



                                                  21           



                                                  14:08:00           



                                                  CST,           



                                                  Weight           

 

     Mupirocin      2021      Yes                      1 appl,           [facility]

[facility]



     0.02 MG/MG      1-22                               TOP, TID,           l



     Topical      23:21:                               X 5 day, #           [facility]

[facility]



     Ointment      00                                 22 gm, 0           



                                                  Refill(s),           



                                                  Pharmacy:           



                                                  [facility]           



                                                  #[id number],           



                                                  165.1, cm,           



                                                  21           



                                                  14:08:00           



                                                  CST,           



                                                  Height,           



                                                  136.42,           



                                                  kg,            



                                                  21           



                                                  14:08:00           



                                                  CST,           



                                                  Weight           

 

     Mupirocin      2021      Yes                      1 appl,           [facility]

[facility]



     0.02 MG/MG      1-22                               TOP, TID,           l



     Topical      23:21:                               X 5 day, #           [facility]

[facility]



     Ointment      00                                 22 gm, 0           



                                                  Refill(s),           



                                                  Pharmacy:           



                                                  [facility]           



                                                  [facility]           



                                                  #[id number],           



                                                  165.1, cm,           



                                                  21           



                                                  14:08:00           



                                                  CST,           



                                                  Height,           



                                                  136.42,           



                                                  kg,            



                                                  21           



                                                  14:08:00           



                                                  CST,           



                                                  Weight           

 

     bumetanide      2021      Yes                      2 mg = 1           [facility]

[facility]



     2 mg oral      1-19                               tab, PO,           l



     tablet      21:11:                               Daily, #           [place]



               00                                 30 tab, 0           



                                                  Refill(s)           

 

     bumetanide      2021      Yes                      2 mg = 1           [facility]

[facility]



     2 mg oral      1-19                               tab, PO,           l



     tablet      21:11:                               Daily, #           [facility]



               00                                 30 tab, 0           



                                                  Refill(s)           

 

     bumetanide      2021      Yes                      2 mg = 1           [facility]

[facility]



     2 mg oral      1-19                               tab, PO,           l



     tablet      21:11:                               Daily, #           [place]



               00                                 30 tab, 0           



                                                  Refill(s)           

 

     bumetanide      2021      Yes                      2 mg = 1           [facility]

[facility]



     2 mg oral      1-19                               tab, PO,           l



     tablet      21:11:                               Daily, #           [place]



               00                                 30 tab, 0           



                                                  Refill(s)           

 

     bumetanide      2021      Yes                      2 mg = 1           [facility]

[facility]



     2 mg oral      1-19                               tab, PO,           l



     tablet      21:11:                               Daily, #           [facility]



               00                                 30 tab, 0           



                                                  Refill(s)           

 

     allopurinol      2021      Yes                      100 mg = 1           

[facility]



     100 mg oral      1-19                               tab, PO,           l



     tablet      21:10:                               BID, # 60           [facility]

n



               00                                 tab, 0           



                                                  Refill(s)           

 

     gabapentin      2021      Yes                      200 mg = 2           [facility]

[facility]



     100 MG Oral      1-19                               cap, PO,           l



     Capsule      21:10:                               Bedtime, 0           [facility]

[facility]



               00                                 Refill(s)           

 

     allopurinol      2021      Yes                      100 mg = 1           

[facility]



     100 mg oral      1-19                               tab, PO,           l



     tablet      21:10:                               BID, # 60           [facility]

n



               00                                 tab, 0           



                                                  Refill(s)           

 

     gabapentin      2021      Yes                      200 mg = 2           [facility]

[facility]



     100 MG Oral      1-19                               cap, PO,           l



     Capsule      21:10:                               Bedtime, 0           [facility]

[facility]



               00                                 Refill(s)           

 

     allopurinol      2021      Yes                      100 mg = 1           

[facility]



     100 mg oral      1-19                               tab, PO,           l



     tablet      21:10:                               BID, # 60           [facility]

n



               00                                 tab, 0           



                                                  Refill(s)           

 

     gabapentin      2021      Yes                      200 mg = 2           [facility]

[facility]



     100 MG Oral      1-19                               cap, PO,           l



     Capsule      21:10:                               Bedtime, 0           [facility]

[facility]



               00                                 Refill(s)           

 

     allopurinol      2021      Yes                      100 mg = 1           

[facility]



     100 mg oral      1-19                               tab, PO,           l



     tablet      21:10:                               BID, # 60           [facility]

n



               00                                 tab, 0           



                                                  Refill(s)           

 

     gabapentin      2021      Yes                      200 mg = 2           [facility]

[facility]



     100 MG Oral      1-19                               cap, PO,           l



     Capsule      21:10:                               Bedtime, 0           [facility]

[facility]



               00                                 Refill(s)           

 

     allopurinol      2021      Yes                      100 mg = 1           

[facility]



     100 mg oral      1-19                               tab, PO,           l



     tablet      21:10:                               BID, # 60           [facility]

n



               00                                 tab, 0           



                                                  Refill(s)           

 

     gabapentin      2021      Yes                      200 mg = 2           [facility]

[facility]



     100 MG Oral      1-19                               cap, PO,           l



     Capsule      21:10:                               Bedtime, 0           [facility]

[facility]



               00                                 Refill(s)           

 

     midodrine      2021      Yes            10mg [id number] Take 10 mg       

    [facility]



     (PROAMATINE      1-13                          [id number] by mouth 3       

    st



     ) 10 MG      00:00:                          3D   (three)           [facility]



     tablet      00                                 times a           l



                                                  day.           

 

     BUMETanide      2021      Yes            2mg  QD   Take 2 mg           [facility]

[facility]



     (BUMEX) 2      1-12                               by mouth           st



     MG tablet      00:00:                               every           [facility]



               00                                 morning.           l

 

     digOXIN      2021      Yes            125ug [id number] Take 125          

 [facility]



     (LANOXIN)      1-12                          [id number] mcg by           st



     125 mcg      00:00:                          3W   mouth 3           [facility]



     (0.125 mg)      00                                 (three)           l



     tablet                                         times a           



                                                  week. On           



                                                  dialysis           



                                                  days[place]uesda           



                                                  y,             



                                                  Thursday,           



                                                  Saturday           

 

     carvediloL      2021- No                                      [facility]

i



     (COREG)      1-12 -28                                         st



     3.125 MG      00:00: 00:00                                         [facility]



     tablet      00   :00                                          l

 

     ipratropium      2021- No        [id number] .5mg Q.25D Take 2.5      

     [facility]



     (ATROVENT)      0-05 02-28                          mL (0.5 mg           st



     0.02 %      00:00: 00:00                          total) [facility]           [facility]

[facility]



     nebulizer      00   :00                           nebulizati           l



     solution                                         on 4           



                                                  (four)           



                                                  times a           



                                                  day.           

 

     QUEtiapine      2021-0      Yes                      1 tablet,           [facility]

[facility]



     50 mg oral      3-30                               once a           l



     tablet      13:55:                               day, 0           [place]



               00                                 Refill(s)           

 

     torsemide      2021-0      Yes                      1 tablet,           [facility]

[facility]



     20 mg oral      3-30                               twice a           l



     tablet      13:55:                               day, 0           [facility]



               00                                 Refill(s)           

 

     QUEtiapine      2021-0      Yes                      1 tablet,           [facility]

[facility]



     50 mg oral      3-30                               once a           l



     tablet      13:55:                               day, 0           [facility]



               00                                 Refill(s)           

 

     torsemide      2021-0      Yes                      1 tablet,           [facility]

[facility]



     20 mg oral      3-30                               twice a           l



     tablet      13:55:                               day, 0           [facility]



               00                                 Refill(s)           

 

     QUEtiapine      2021-0      Yes                      1 tablet,           [facility]

[facility]



     50 mg oral      3-30                               once a           l



     tablet      13:55:                               day, 0           [place]



               00                                 Refill(s)           

 

     torsemide      2021-0      Yes                      1 tablet,           [facility]

[facility]



     20 mg oral      3-30                               twice a           l



     tablet      13:55:                               day, 0           [place]



               00                                 Refill(s)           

 

     QUEtiapine      2021-0      Yes                      1 tablet,           [facility]

[facility]



     50 mg oral      3-30                               once a           l



     tablet      13:55:                               day, 0           [place]



               00                                 Refill(s)           

 

     torsemide      2021-0      Yes                      1 tablet,           [facility]

[facility]



     20 mg oral      3-30                               twice a           l



     tablet      13:55:                               day, 0           [place]



               00                                 Refill(s)           

 

     QUEtiapine      2021-0      Yes                      1 tablet,           [facility]

[facility]



     50 mg oral      3-30                               once a           l



     tablet      13:55:                               day, 0           [facility]



               00                                 Refill(s)           

 

     torsemide      2021-0      Yes                      1 tablet,           [facility]

[facility]



     20 mg oral      3-30                               twice a           l



     tablet      13:55:                               day, 0           [place]



               00                                 Refill(s)           

 

     potassium      2021-0      Yes                      1 tablet,           [facility]

[facility]



     chloride 20      3-30                               once a           l



     mEq oral      13:54:                               day, 0           [facility]



     tablet,      00                                 Refill(s)           



     extended                                                        



     release                                                        



     (KCL)                                                        

 

     potassium      2021-0      Yes                      1 tablet,           [facility]

[facility]



     chloride 20      3-30                               once a           l



     mEq oral      13:54:                               day, 0           [facility]



     tablet,      00                                 Refill(s)           



     extended                                                        



     release                                                        



     (KCL)                                                        

 

     potassium            Yes                      1 tablet,           [facility]

[facility]



     chloride 20      3-30                               once a           l



     mEq oral      13:54:                               day, 0           [facility]



     tablet,      00                                 Refill(s)           



     extended                                                        



     release                                                        



     (KCL)                                                        

 

     potassium      0      Yes                      1 tablet,           [facility]

[facility]



     chloride 20      3-30                               once a           l



     mEq oral      13:54:                               day, 0           [place]



     tablet,      00                                 Refill(s)           



     extended                                                        



     release                                                        



     (KCL)                                                        

 

     potassium      0      Yes                      1 tablet,           [facility]

[facility]



     chloride 20      3-30                               once a           l



     mEq oral      13:54:                               day, 0           [place]



     tablet,      00                                 Refill(s)           



     extended                                                        



     release                                                        



     (KCL)                                                        

 

     allopurinol            Yes                      1/2            [facility]

a



     300 mg oral      3-30                               tablet,           l



     tablet      13:53:                               once a           [place]



               00                                 day, 0           



                                                  Refill(s)           

 

     carvedilol            Yes                      1 tablet,           [facility]

[facility]



     3.125 mg      3-30                               twice a           l



     oral tablet      13:53:                               day, 0           [facility]

[facility]



               00                                 Refill(s)           

 

     Digoxin            Yes                      1 tablet,           [facility]

[facility]



     0.125 MG      3-30                               once a           l



     Oral Tablet      13:53:                               day, 0           [facility]

[facility]



               00                                 Refill(s)           

 

     DULoxetine            Yes                      1 capsule,           [facility]

[facility]



     60 mg oral      3-30                               once a           l



     delayed      13:53:                               day, 0           [place]



     release      00                                 Refill(s)           



     capsule                                                        

 

     apixaban 5            Yes                      1 tablet,           [facility]

[facility]



     MG Oral      3-30                               twice a           l



     Tablet      13:53:                               day, 0           [place]



     [Eliquis]      00                                 Refill(s)           

 

     gabapentin            Yes                      1 capsule,           [facility]

[facility]



     300 MG Oral      3-30                               twice a           l



     Capsule      13:53:                               day, 0           [place]



               00                                 Refill(s)           

 

     metoprolol            Yes                      1 tablet,           [facility]

[facility]



     tartrate 50      3-30                               Twice a           l



     mg oral      13:53:                               day, 0           [place]



     tablet      00                                 Refill(s)           

 

     midodrine 5            Yes                      1 tablet,           [facility]

[facility]



     mg oral      3-30                               twice a           l



     tablet      13:53:                               day, 0           [place]



               00                                 Refill(s)           

 

     allopurinol            Yes                      1/2            [facility]

[facility]



     300 mg oral      3-30                               tablet,           l



     tablet      13:53:                               once a           [facility]



               00                                 day, 0           



                                                  Refill(s)           

 

     carvedilol            Yes                      1 tablet,           [facility]

[facility]



     3.125 mg      3-30                               twice a           l



     oral tablet      13:53:                               day, 0           [facility]

[facility]



               00                                 Refill(s)           

 

     Digoxin      0      Yes                      1 tablet,           [facility]

[facility]



     0.125 MG      3-30                               once a           l



     Oral Tablet      13:53:                               day, 0           [facility]

[facility]



               00                                 Refill(s)           

 

     DULoxetine            Yes                      1 capsule,           [facility]

[facility]



     60 mg oral      3-30                               once a           l



     delayed      13:53:                               day, 0           [place]



     release      00                                 Refill(s)           



     capsule                                                        

 

     apixaban 5            Yes                      1 tablet,           [facility]

[facility]



     MG Oral      3-30                               twice a           l



     Tablet      13:53:                               day, 0           [place]



     [Eliquis]      00                                 Refill(s)           

 

     gabapentin            Yes                      1 capsule,           [facility]

[facility]



     300 MG Oral      3-30                               twice a           l



     Capsule      13:53:                               day, 0           [place]



               00                                 Refill(s)           

 

     metoprolol            Yes                      1 tablet,           [facility]

[facility]



     tartrate 50      3-30                               Twice a           l



     mg oral      13:53:                               day, 0           [place]



     tablet      00                                 Refill(s)           

 

     midodrine 5            Yes                      1 tablet,           [facility]

[facility]



     mg oral      3-30                               twice a           l



     tablet      13:53:                               day, 0           [place]



               00                                 Refill(s)           

 

     allopurinol            Yes                      1/2            [facility]

[facility]



     300 mg oral      3-30                               tablet,           l



     tablet      13:53:                               once a           [place]



               00                                 day, 0           



                                                  Refill(s)           

 

     carvedilol            Yes                      1 tablet,           [facility]

[facility]



     3.125 mg      3-30                               twice a           l



     oral tablet      13:53:                               day, 0           [facility]

[facility]



               00                                 Refill(s)           

 

     Digoxin            Yes                      1 tablet,           [facility]

[facility]



     0.125 MG      3-30                               once a           l



     Oral Tablet      13:53:                               day, 0           [facility]

[facility]



               00                                 Refill(s)           

 

     DULoxetine      0      Yes                      1 capsule,           [facility]

[facility]



     60 mg oral      3-30                               once a           l



     delayed      13:53:                               day, 0           [facility]



     release      00                                 Refill(s)           



     capsule                                                        

 

     apixaban 5            Yes                      1 tablet,           [facility]

[facility]



     MG Oral      3-30                               twice a           l



     Tablet      13:53:                               day, 0           [place]



     [Eliquis]      00                                 Refill(s)           

 

     gabapentin      2021-0      Yes                      1 capsule,           [facility]

[facility]



     300 MG Oral      3-30                               twice a           l



     Capsule      13:53:                               day, 0           [place]



               00                                 Refill(s)           

 

     metoprolol      -0      Yes                      1 tablet,           [facility]

[facility]



     tartrate 50      3-30                               Twice a           l



     mg oral      13:53:                               day, 0           [place]



     tablet      00                                 Refill(s)           

 

     midodrine 5            Yes                      1 tablet,           [facility]

[facility]



     mg oral      3-30                               twice a           l



     tablet      13:53:                               day, 0           [place]



               00                                 Refill(s)           

 

     allopurinol            Yes                      1/2            [facility]

[facility]



     300 mg oral      3-30                               tablet,           l



     tablet      13:53:                               once a           [place]



               00                                 day, 0           



                                                  Refill(s)           

 

     carvedilol      0      Yes                      1 tablet,           [facility]

[facility]



     3.125 mg      3-30                               twice a           l



     oral tablet      13:53:                               day, 0           [facility]

[facility]



               00                                 Refill(s)           

 

     Digoxin      0      Yes                      1 tablet,           [facility]

[facility]



     0.125 MG      3-30                               once a           l



     Oral Tablet      13:53:                               day, 0           [facility]

[facility]



               00                                 Refill(s)           

 

     DULoxetine      0      Yes                      1 capsule,           [facility]

[facility]



     60 mg oral      3-30                               once a           l



     delayed      13:53:                               day, 0           [place]



     release      00                                 Refill(s)           



     capsule                                                        

 

     apixaban 5      0      Yes                      1 tablet,           [facility]

[facility]



     MG Oral      3-30                               twice a           l



     Tablet      13:53:                               day, 0           [facility]



     [Eliquis]      00                                 Refill(s)           

 

     gabapentin      0      Yes                      1 capsule,           [facility]

[facility]



     300 MG Oral      3-30                               twice a           l



     Capsule      13:53:                               day, 0           [facility]



               00                                 Refill(s)           

 

     metoprolol      0      Yes                      1 tablet,           [facility]

[facility]



     tartrate 50      3-30                               Twice a           l



     mg oral      13:53:                               day, 0           [facility]



     tablet      00                                 Refill(s)           

 

     midodrine 5            Yes                      1 tablet,           [facility]

[facility]



     mg oral      3-30                               twice a           l



     tablet      13:53:                               day, 0           [place]



               00                                 Refill(s)           

 

     allopurinol      -0      Yes                      1/2            [facility]

[facility]



     300 mg oral      3-30                               tablet,           l



     tablet      13:53:                               once a           [facility]



               00                                 day, 0           



                                                  Refill(s)           

 

     carvedilol      0      Yes                      1 tablet,           [facility]

[facility]



     3.125 mg      3-30                               twice a           l



     oral tablet      13:53:                               day, 0           [facility]

[facility]



               00                                 Refill(s)           

 

     Digoxin            Yes                      1 tablet,           [facility]

[facility]



     0.125 MG      3-30                               once a           l



     Oral Tablet      13:53:                               day, 0           [facility]

[facility]



               00                                 Refill(s)           

 

     DULoxetine            Yes                      1 capsule,           [facility]

[facility]



     60 mg oral      3-30                               once a           l



     delayed      13:53:                               day, 0           [place]



     release      00                                 Refill(s)           



     capsule                                                        

 

     apixaban 5            Yes                      1 tablet,           [facility]

[facility]



     MG Oral      3-30                               twice a           l



     Tablet      13:53:                               day, 0           [place]



     [Eliquis]      00                                 Refill(s)           

 

     gabapentin            Yes                      1 capsule,           [facility]

[facility]



     300 MG Oral      3-30                               twice a           l



     Capsule      13:53:                               day, 0           [facility]



               00                                 Refill(s)           

 

     metoprolol            Yes                      1 tablet,           [facility]

[facility]



     tartrate 50      3-30                               Twice a           l



     mg oral      13:53:                               day, 0           [place]



     tablet      00                                 Refill(s)           

 

     midodrine 5            Yes                      1 tablet,           [facility]

[facility]



     mg oral      3-30                               twice a           l



     tablet      13:53:                               day, 0           [place]



               00                                 Refill(s)           

 

     DULoxetine      2020      Yes                      60 mg = 1           [facility]

[facility]



     60 mg oral      1-25                               cap, PO,           l



     delayed      17:17:                               Daily, #           [facility]

n



     release      00                                 30 cap, 0           



     capsule                                         Refill(s)           

 

     Spironolact      2020      Yes                      25 mg = 1           [facility]

[facility]



     one 25 MG      1-25                               tab, PO,           l



     Oral Tablet      17:17:                               Daily, 0           [facility]

[facility]



     [Aldactone]      00                                 Refill(s)           

 

     DULoxetine      2020      Yes                      60 mg = 1           [facility]

[facility]



     60 mg oral      1-25                               cap, PO,           l



     delayed      17:17:                               Daily, #           [facility]

n



     release      00                                 30 cap, 0           



     capsule                                         Refill(s)           

 

     Spironolact      2020      Yes                      25 mg = 1           [facility]

[facility]



     one 25 MG      1-25                               tab, PO,           l



     Oral Tablet      17:17:                               Daily, 0           [facility]

[facility]



     [Aldactone]      00                                 Refill(s)           

 

     DULoxetine      2020      Yes                      60 mg = 1           [facility]

[facility]



     60 mg oral      1-25                               cap, PO,           l



     delayed      17:17:                               Daily, #           [facility]

n



     release      00                                 30 cap, 0           



     capsule                                         Refill(s)           

 

     Spironolact      2020      Yes                      25 mg = 1           [facility]

[facility]



     one 25 MG      1-25                               tab, PO,           l



     Oral Tablet      17:17:                               Daily, 0           [facility]

[facility]



     [Aldactone]      00                                 Refill(s)           

 

     DULoxetine      2020      Yes                      60 mg = 1           [facility]

[facility]



     60 mg oral      1-25                               cap, PO,           l



     delayed      17:17:                               Daily, #           [facility]

n



     release      00                                 30 cap, 0           



     capsule                                         Refill(s)           

 

     Spironolact      2020      Yes                      25 mg = 1           [facility]

[facility]



     one 25 MG      1-25                               tab, PO,           l



     Oral Tablet      17:17:                               Daily, 0           [facility]

[facility]



     [Aldactone]      00                                 Refill(s)           

 

     DULoxetine      2020      Yes                      60 mg = 1           [facility]

[facility]



     60 mg oral      1-25                               cap, PO,           l



     delayed      17:17:                               Daily, #           [facility]

n



     release      00                                 30 cap, 0           



     capsule                                         Refill(s)           

 

     Spironolact      2020      Yes                      25 mg = 1           [facility]

[facility]



     one 25 MG      1-25                               tab, PO,           l



     Oral Tablet      17:17:                               Daily, 0           [facility]

[facility]



     [Aldactone]      00                                 Refill(s)           

 

     Digoxin      2020      Yes                      0.125 mg,           [facility]

[facility]



     0.125 MG      1-25                               PO, Daily,           l



     Oral Tablet      17:13:                               # 30 tab,           [facility]

[facility]



               00                                 0              



                                                  Refill(s)           

 

     Digoxin      2020      Yes                      0.125 mg,           [facility]

[facility]



     0.125 MG      1-25                               PO, Daily,           l



     Oral Tablet      17:13:                               # 30 tab,           [facility]

[facility]



               00                                 0              



                                                  Refill(s)           

 

     Digoxin      2020      Yes                      0.125 mg,           [facility]

[facility]



     0.125 MG      1-25                               PO, Daily,           l



     Oral Tablet      17:13:                               # 30 tab,           [facility]

[facility]



               00                                 0              



                                                  Refill(s)           

 

     Digoxin      -      Yes                      0.125 mg,           [facility]

[facility]



     0.125 MG      1-25                               PO, Daily,           l



     Oral Tablet      17:13:                               # 30 tab,           [facility]

[facility]



               00                                 0              



                                                  Refill(s)           

 

     Digoxin      2020      Yes                      0.125 mg,           [facility]

[facility]



     0.125 MG      1-25                               PO, Daily,           l



     Oral Tablet      17:13:                               # 30 tab,           [facility]

[facility]



               00                                 0              



                                                  Refill(s)           

 

     Mupirocin      2020      Yes                      See            [facility]



     0.02 MG/MG      0-13                               Instructio           l



     Topical      15:44:                               ns, APPLY           [facility]

nn



     Ointment      00                                 EXTERNALLY           



                                                  TO THE           



                                                  AFFECTED           



                                                  AREA TWICE           



                                                  DAILY, #           



                                                  66 gm, 1           



                                                  Refill(s),           



                                                  Pharmacy:           



                                                  [facility]           



                                                  #[id number],           



                                                  170.18,           



                                                  cm,            



                                                  20           



                                                  14:42:00           



                                                  CST,           



                                                  Height,           



                                                  164.318,           



                                                  kg,            



                                                  20           



                                                  14:42:00           



                                                  CST,           



                                                  Weight           

 

     Mupirocin      2020-1      Yes                      See            [facility]



     0.02 MG/MG      0-13                               Instructio           l



     Topical      15:44:                               ns, APPLY           [facility]

nn



     Ointment      00                                 EXTERNALLY           



                                                  TO THE           



                                                  AFFECTED           



                                                  AREA TWICE           



                                                  DAILY, #           



                                                  66 gm, 1           



                                                  Refill(s),           



                                                  Pharmacy:           



                                                  [facility]           



                                                  #[id number],           



                                                  170.18,           



                                                  cm,            



                                                  20           



                                                  14:42:00           



                                                  CST,           



                                                  Height,           



                                                  164.318,           



                                                  kg,            



                                                  20           



                                                  14:42:00           



                                                  CST,           



                                                  Weight           

 

     Mupirocin      2020-      Yes                      See            [facility]



     0.02 MG/MG      0-13                               Instructio           l



     Topical      15:44:                               ns, APPLY           [facility]

nn



     Ointment      00                                 EXTERNALLY           



                                                  TO THE           



                                                  AFFECTED           



                                                  AREA TWICE           



                                                  DAILY, #           



                                                  66 gm, 1           



                                                  Refill(s),           



                                                  [place]armacy[place]           



                                                  [facility] [place]           



                                                  [id number],           



                                                  170.18,           



                                                  cm,            



                                                  20           



                                                  14:42:00           



                                                  CST,           



                                                  Height,           



                                                  164.318,           



                                                  kg,            



                                                  20           



                                                  14:42:00           



                                                  CST,           



                                                  Weight           

 

     Mupirocin      2020-1      Yes                      See            [facility]



     0.02 MG/MG      0-13                               Instructio           l



     Topical      15:44:                               ns, APPLY           [facility]

[facility]



     Ointment      00                                 EXTERNALLY           



                                                  TO THE           



                                                  AFFECTED           



                                                  AREA TWICE           



                                                  DAILY, #           



                                                  66 gm, 1           



                                                  Refill(s),           



                                                  Pharmacy:           



                                                  [facility]           



                                                  #[id number],           



                                                  170.18,           



                                                  cm,            



                                                  20           



                                                  14:42:00           



                                                  CST,           



                                                  Height,           



                                                  164.318,           



                                                  kg,            



                                                  20           



                                                  14:42:00           



                                                  CST,           



                                                  Weight           

 

     Mupirocin      2020-1      Yes                      See            [facility]



     0.02 MG/MG      0-13                               Instructio           l



     Topical      15:44:                               ns, APPLY           [facility]

nn



     Ointment      00                                 EXTERNALLY           



                                                  TO THE           



                                                  AFFECTED           



                                                  AREA TWICE           



                                                  DAILY, #           



                                                  66 gm, 1           



                                                  Refill(s),           



                                                  Pharmacy:           



                                                  [facility]           



                                                  #[id number],           



                                                  170.18,           



                                                  cm,            



                                                  20           



                                                  14:42:00           



                                                  CST,           



                                                  Height,           



                                                  164.318,           



                                                  kg,            



                                                  20           



                                                  14:42:00           



                                                  CST,           



                                                  Weight           

 

     Nitrofurant      2020-0      Yes                      100 mg = 1           

[facility]



     oin 100 MG      8-09                               cap, PO,           l



     Oral      17:57:                               BID, X 10           [place]



     Capsule      00                                 day, # 20           



     [Macrobid]                                         cap, 0           



                                                  Refill(s),           



                                                  Pharmacy:           



                                                  [facility]           



                                                  #[id number],           



                                                  170.18,           



                                                  cm,            



                                                  20           



                                                  14:42:00           



                                                  CST,           



                                                  Height,           



                                                  164.318,           



                                                  kg,            



                                                  20           



                                                  14:42:00           



                                                  CST,           



                                                  Weight           

 

     Nitrofurant      2020-0      Yes                      100 mg = 1           

[facility]



     oin 100 MG      8-09                               cap, PO,           l



     Oral      17:57:                               BID, X 10           [place]



     Capsule      00                                 day, # 20           



     [Macrobid]                                         cap, 0           



                                                  Refill(s),           



                                                  Pharmacy:           



                                                  [facility]           



                                                  #[id number],           



                                                  170.18,           



                                                  cm,            



                                                  20           



                                                  14:42:00           



                                                  CST,           



                                                  Height,           



                                                  164.318,           



                                                  kg,            



                                                  20           



                                                  14:42:00           



                                                  CST,           



                                                  Weight           

 

     Nitrofurant      2020-0      Yes                      100 mg = 1           

[facility]



     oin 100 MG      8-09                               cap, PO,           l



     Oral      17:57:                               BID, X 10           [place]



     Capsule      00                                 day, # 20           



     [Macrobid]                                         cap, 0           



                                                  Refill(s),           



                                                  Pharmacy:           



                                                  [facility]           



                                                  #[id number],           



                                                  170.18,           



                                                  cm,            



                                                  20           



                                                  14:42:00           



                                                  CST,           



                                                  Height,           



                                                  164.318,           



                                                  kg,            



                                                  20           



                                                  14:42:00           



                                                  CST,           



                                                  Weight           

 

     Nitrofurant      2020-0      Yes                      100 mg = 1           

[facility]



     oin 100 MG      8-09                               cap, PO,           l



     Oral      17:57:                               BID, X 10           [place]



     Capsule      00                                 day, # 20           



     [Macrobid]                                         cap, 0           



                                                  Refill(s),           



                                                  Pharmacy:           



                                                  [facility]           



                                                  #[id number],           



                                                  170.18,           



                                                  cm,            



                                                  20           



                                                  14:42:00           



                                                  CST,           



                                                  Height,           



                                                  164.318,           



                                                  kg,            



                                                  20           



                                                  14:42:00           



                                                  CST,           



                                                  Weight           

 

     Nitrofurant      2020-0      Yes                      100 mg = 1           

[facility]



     oin 100 MG      8-09                               cap, PO,           l



     Oral      17:57:                               BID, X 10           [place]



     Capsule      00                                 day, # 20           



     [Macrobid]                                         cap, 0           



                                                  Refill(s),           



                                                  Pharmacy:           



                                                  [facility]           



                                                  #[id number],           



                                                  170.18,           



                                                  cm,            



                                                  20           



                                                  14:42:00           



                                                  CST,           



                                                  Height,           



                                                  164.318,           



                                                  kg,            



                                                  20           



                                                  14:42:00           



                                                  CST,           



                                                  Weight           

 

     lisinopril      2020-0      Yes                      2.5 mg = 1           [facility]

[facility]



     2.5 mg oral      6-04                               tab, PO,           l



     tablet      23:26:                               Daily, #           [place]



               00                                 30 tab, 2           



                                                  Refill(s),           



                                                  called to           



                                                  pharmacy           

 

     lisinopril      2020-0      Yes                      2.5 mg = 1           [facility]

[facility]



     2.5 mg oral      6-04                               tab, PO,           l



     tablet      23:26:                               Daily, #           [facility]



               00                                 30 tab, 2           



                                                  Refill(s),           



                                                  called to           



                                                  pharmacy           

 

     lisinopril      2020-0      Yes                      2.5 mg = 1           [facility]

[facility]



     2.5 mg oral      6-04                               tab, PO,           l



     tablet      23:26:                               Daily, #           [facility]



               00                                 30 tab, 2           



                                                  Refill(s),           



                                                  called to           



                                                  pharmacy           

 

     lisinopril      2020-0      Yes                      2.5 mg = 1           [facility]

[facility]



     2.5 mg oral      6-04                               tab, PO,           l



     tablet      23:26:                               Daily, #           [place]



               00                                 30 tab, 2           



                                                  Refill(s),           



                                                  called to           



                                                  pharmacy           

 

     lisinopril      2020-0      Yes                      2.5 mg = 1           [facility]

[facility]



     2.5 mg oral      6-04                               tab, PO,           l



     tablet      23:26:                               Daily, #           [place]



               00                                 30 tab, 2           



                                                  Refill(s),           



                                                  called to           



                                                  pharmacy           

 

     calcitriol      2020-0      Yes                      = 1 cap,           [facility]

[facility]



     0.25 mcg      5-20                               PO, Daily,           l



     oral      12:18:                               # 90 cap,           [place]



     capsule      00                                 1              



                                                  Refill(s),           



                                                  Pharmacy:           



                                                  [facility] [place]           



                                                  [id number]           

 

     calcitriol      2020-0      Yes                      = 1 cap,           [facility]

[facility]



     0.25 mcg      5-20                               PO, Daily,           l



     oral      12:18:                               # 90 cap,           [facility]



     capsule      00                                 1              



                                                  Refill(s),           



                                                  Pharmacy:           



                                                  [facility]           



                                                  #[id number]           

 

     calcitriol      2020-0      Yes                      = 1 cap,           [facility]

[facility]



     0.25 mcg      5-20                               PO, Daily,           l



     oral      12:18:                               # 90 cap,           [facility]



     capsule      00                                 1              



                                                  Refill(s),           



                                                  Pharmacy:           



                                                  [facility] [place]           



                                                  [id number]           

 

     calcitriol      2020-0      Yes                      = 1 cap,           [facility]

[facility]



     0.25 mcg      5-20                               PO, Daily,           l



     oral      12:18:                               # 90 cap,           [place]



     capsule      00                                 1              



                                                  Refill(s),           



                                                  Pharmacy:           



                                                  [facility]           



                                                  [facility] [place]           



                                                  [id number]           

 

     calcitriol      2020-0      Yes                      = 1 cap,           [facility]

[facility]



     0.25 mcg      5-20                               PO, Daily,           l



     oral      12:18:                               # 90 cap,           [facility]



     capsule      00                                 1              



                                                  Refill(s),           



                                                  Pharmacy:           



                                                  [facility]           



                                                  [facility] [place]           



                                                  [place][id number]           

 

     calcitriol      2020-0      Yes                      = 1 cap,           [facility]

[facility]



     0.25 mcg      4-16                               PO, Daily,           l



     oral      16:37:                               # 90           [facility]



     capsule      47                                 unknown           



                                                  unit,           



                                                  Pharmacy:           



                                                  [facility]           



                                                  [facility] [place]           



                                                  [id number]           

 

     calcitriol      2020-0      Yes                      = 1 cap,           [facility]

[facility]



     0.25 mcg      4-16                               PO, Daily,           l



     oral      16:37:                               # 90           [place]



     capsule      47                                 unknown           



                                                  unit,           



                                                  Pharmacy:           



                                                  [facility]           



                                                  [facility] [place]           



                                                  [id number]           

 

     calcitriol      2020-0      Yes                      = 1 cap,           [facility]

[facility]



     0.25 mcg      4-16                               PO, Daily,           l



     oral      16:37:                               # 90           [place]



     capsule      47                                 unknown           



                                                  unit,           



                                                  Pharmacy:           



                                                  [facility]           



                                                  [facility] [id number]           

 

     calcitriol      2020-0      Yes                      = 1 cap,           [facility]

[facility]



     0.25 mcg      4-16                               PO, Daily,           l



     oral      16:37:                               # 90           [place]



     capsule      47                                 unknown           



                                                  unit,           



                                                  Pharmacy:           



                                                  [facility]           



                                                  [facility] [id number]           

 

     calcitriol      2020-0      Yes                      = 1 cap,           [facility]

[facility]



     0.25 mcg      4-16                               PO, Daily,           l



     oral      16:37:                               # 90           [place]



     capsule      47                                 unknown           



                                                  unit,           



                                                  Pharmacy:           



                                                  [facility]           



                                                  [facility] [place]           



                                                  [id number]           

 

     Furosemide      2020-0      Yes                      = 1 tab,           [facility]

[facility]



     40 MG Oral      4-13                               PO, Every           l



     Tablet      20:06:                               Other Day,           [facility]

nn



               19                                 # 45 tab,           



                                                  Pharmacy:           



                                                  [facility]           



                                                  [facility] [id number]           

 

     Furosemide      2020-0      Yes                      = 1 tab,           [facility]

[facility]



     40 MG Oral      4-13                               PO, Every           l



     Tablet      20:06:                               Other Day,           [place]

nn



               19                                 # 45 tab,           



                                                  Pharmacy:           



                                                  [facility]           



                                                  [facility]           



                                                  #[id number]           

 

     Furosemide      2020-0      Yes                      = 1 tab,           [facility]

[facility]



     40 MG Oral      4-13                               PO, Every           l



     Tablet      20:06:                               Other Day,           [facility]

[facility]



               19                                 # 45 tab,           



                                                  Pharmacy:           



                                                  [facility]           



                                                  [facility] [place]           



                                                  [id number]           

 

     Furosemide      2020-0      Yes                      = 1 tab,           [facility]

[facility]



     40 MG Oral      4-13                               PO, Every           l



     Tablet      20:06:                               Other Day,           [place]

nn



               19                                 # 45 tab,           



                                                  Pharmacy:           



                                                  [facility]           



                                                  #[id number]           

 

     Furosemide      2020-0      Yes                      = 1 tab,           [facility]

[facility]



     40 MG Oral      4-13                               PO, Every           l



     Tablet      20:06:                               Other Day,           [place]

nn



               19                                 # 45 tab,           



                                                  Pharmacy:           



                                                  [facility]           



                                                  #[id number]           

 

     prednisolon      2020-0      Yes                      1 drop in           M

[facility]



     e acetate      4-10                               each eye,           l



     10 MG/ML      20:53:                               once           [place]



     Ophthalmic      00                                 daily, 0           



     Suspension                                         Refill(s)           

 

     bromfenac      2020-0      Yes                      1 drop in           [facility]

[facility]



     0.7 MG/ML      4-10                               right eye,           l



     Ophthalmic      20:53:                               once           [place]



     Solution      00                                 daily, 0           



     [Prolensa]                                         Refill(s)           

 

     prednisolon      2020-0      Yes                      1 drop in           M

[facility]



     e acetate      4-10                               each eye,           l



     10 MG/ML      20:53:                               once           [place]



     Ophthalmic      00                                 daily, 0           



     Suspension                                         Refill(s)           

 

     bromfenac      2020-0      Yes                      1 drop in           [facility]

[facility]



     0.7 MG/ML      4-10                               right eye,           l



     Ophthalmic      20:53:                               once           [place]



     Solution      00                                 daily, 0           



     [Prolensa]                                         Refill(s)           

 

     prednisolon      2020-0      Yes                      1 drop in           [facility]

[facility]



     e acetate      4-10                               each eye,           l



     10 MG/ML      20:53:                               once           [place]



     Ophthalmic      00                                 daily, 0           



     Suspension                                         Refill(s)           

 

     bromfenac      2020-0      Yes                      1 drop in           [facility]

[facility]



     0.7 MG/ML      4-10                               right eye,           l



     Ophthalmic      20:53:                               once           [place]



     Solution      00                                 daily, 0           



     [Prolensa]                                         Refill(s)           

 

     prednisolon      2020-0      Yes                      1 drop in           [facility]

[facility]



     e acetate      4-10                               each eye,           l



     10 MG/ML      20:53:                               once           [place]



     Ophthalmic      00                                 daily, 0           



     Suspension                                         Refill(s)           

 

     bromfenac      2020-0      Yes                      1 drop in           [facility]

[facility]



     0.7 MG/ML      4-10                               right eye,           l



     Ophthalmic      20:53:                               once           [place]



     Solution      00                                 daily, 0           



     [Prolensa]                                         Refill(s)           

 

     prednisolon      2020-0      Yes                      1 drop in           [facility]

[facility]



     e acetate      4-10                               each eye,           l



     10 MG/ML      20:53:                               once           [place]



     Ophthalmic      00                                 daily, 0           



     Suspension                                         Refill(s)           

 

     bromfenac      2020-0      Yes                      1 drop in           [facility]

[facility]



     0.7 MG/ML      4-10                               right eye,           l



     Ophthalmic      20:53:                               once           [place]



     Solution      00                                 daily, 0           



     [Prolensa]                                         Refill(s)           

 

     Furosemide      2020-0      Yes                      = 1 tab,           [facility]

[facility]



     40 MG Oral      1-23                               PO, Every           l



     Tablet      15:13:                               Other Day,           [place]

nn



               34                                 # 45 tab,           



                                                  Pharmacy:           



                                                  [facility]           



                                                  #[id number]           

 

     Furosemide      2020-0      Yes                      = 1 tab,           [facility]

[facility]



     40 MG Oral      1-23                               PO, Every           l



     Tablet      15:13:                               Other Day,           [facility]



               34                                 # 45 tab,           



                                                  Pharmacy:           



                                                  [facility]           



                                                  #[id number]           

 

     Furosemide      2020-0      Yes                      = 1 tab,           [facility]

[facility]



     40 MG Oral      1-23                               PO, Every           l



     Tablet      15:13:                               Other Day,           [facility]



               34                                 # 45 tab,           



                                                  Pharmacy:           



                                                  [facility]           



                                                  #[id number]           

 

     Furosemide      2020-0      Yes                      = 1 tab,           [facility]

[facility]



     40 MG Oral      1-23                               PO, Every           l



     Tablet      15:13:                               Other Day,           [facility]



               34                                 # 45 tab,           



                                                  Pharmacy:           



                                                  [facility]           



                                                  #[id number]           

 

     Furosemide      2020-0      Yes                      = 1 tab,           [facility]

[facility]



     40 MG Oral      1-23                               PO, Every           l



     Tablet      15:13:                               Other Day,           [facility]



               34                                 # 45 tab,           



                                                  Pharmacy:           



                                                  [facility]           



                                                  [facility] [place]           



                                                  [id number]           

 

     Mupirocin      2019      Yes                      See            [facility]



     0.02 MG/MG      2-27                               Instructio           l



     Topical      19:11:                               ns, # 66           [facility]

n



     Ointment      15                                 gm, APPLY           



                                                  EXTERNALLY           



                                                  TO THE           



                                                  AFFECTED           



                                                  AREA TWICE           



                                                  DAILY,           



                                                  Pharmacy:           



                                                  [facility]           



                                                  [facility] [id number]           

 

     Mupirocin      2019      Yes                      See            [facility]



     0.02 MG/MG      2-27                               Instructio           l



     Topical      19:11:                               ns, # 66           [facility]

n



     Ointment      15                                 gm, APPLY           



                                                  EXTERNALLY           



                                                  TO THE           



                                                  AFFECTED           



                                                  AREA TWICE           



                                                  DAILY,           



                                                  Pharmacy:           



                                                  [facility]           



                                                  [facility] [place]           



                                                  [id number]           

 

     Mupirocin      2019      Yes                      See            [facility]



     0.02 MG/MG      2-27                               Instructio           l



     Topical      19:11:                               ns, # 66           [facility]

n



     Ointment      15                                 gm, APPLY           



                                                  EXTERNALLY           



                                                  TO THE           



                                                  AFFECTED           



                                                  AREA TWICE           



                                                  DAILY,           



                                                  Pharmacy:           



                                                  [facility]           



                                                  [facility] [id number]           

 

     Mupirocin      2019      Yes                      See            [facility]



     0.02 MG/MG      2-27                               Instructio           l



     Topical      19:11:                               ns, # 66           [facility]

n



     Ointment      15                                 gm, APPLY           



                                                  EXTERNALLY           



                                                  TO THE           



                                                  AFFECTED           



                                                  AREA TWICE           



                                                  DAILY,           



                                                  Pharmacy:           



                                                  [facility]           



                                                  [facility]           



                                                  #[id number]           

 

     Mupirocin      2019      Yes                      See            [facility]



     0.02 MG/MG      2-27                               Instructio           l



     Topical      19:11:                               ns, # 66           [facility]

n



     Ointment      15                                 gm, APPLY           



                                                  EXTERNALLY           



                                                  TO THE           



                                                  AFFECTED           



                                                  AREA TWICE           



                                                  DAILY,           



                                                  Pharmacy:           



                                                  [facility]           



                                                  [facility]           



                                                  #[id number]           

 

     Nitrofurant      2019      Yes                      100 mg = 1           

[facility]



     oin 100 MG      2-13                               cap, PO,           l



     Oral      01:44:                               BID, X 5           [place]



     Capsule      00                                 day, # 10           



     [Macrodanti                                         cap, 0           



     n]                                           Refill(s),           



                                                  Pharmacy:           



                                                  [facility]           



                                                  #[id number]           

 

     Nitrofurant      2019      Yes                      100 mg = 1           

[facility]



     oin 100 MG      2-13                               cap, PO,           l



     Oral      01:44:                               BID, X 5           [place]



     Capsule      00                                 day, # 10           



     [Macrodanti                                         cap, 0           



     n]                                           Refill(s),           



                                                  Pharmacy:           



                                                  [facility]           



                                                  [facility] [id number]           

 

     Nitrofurant      2019      Yes                      100 mg = 1           

[facility]



     oin 100 MG      2-13                               cap, PO,           l



     Oral      01:44:                               BID, X 5           [place]



     Capsule      00                                 day, # 10           



     [Macrodanti                                         cap, 0           



     n]                                           Refill(s),           



                                                  Pharmacy:           



                                                  [facility]           



                                                  [facility]           



                                                  #[id number]           

 

     Nitrofurant      2019      Yes                      100 mg = 1           

[facility]



     oin 100 MG      2-13                               cap, PO,           l



     Oral      01:44:                               BID, X 5           [place]



     Capsule      00                                 day, # 10           



     [Macrodanti                                         cap, 0           



     n]                                           Refill(s),           



                                                  Pharmacy:           



                                                  [facility]           



                                                  [facility]           



                                                  #[id number]           

 

     Nitrofurant      2019      Yes                      100 mg = 1           

[facility]



     oin 100 MG      2-13                               cap, PO,           l



     Oral      01:44:                               BID, X 5           [place]



     Capsule      00                                 day, # 10           



     [Macrodanti                                         cap, 0           



     n]                                           Refill(s),           



                                                  Pharmacy:           



                                                  [facility]           



                                                  [facility]           



                                                  #[id number]           

 

     apixaban 2019      Yes                      5 mg, PO,           [facility]

[facility]



     MG Oral      0-25                               Q12H, 0           l



     Tablet      15:07:                               Refill(s)           [facility]

n



     [Eliquis]      00                                                

 

     metoprolol      2019      Yes                      50 mg = 1           [facility]

[facility]



     tartrate 50      0-25                               tab, PO,           l



     mg oral      15:07:                               BID, # 180           [facility]

[facility]



     tablet      00                                 tab, 0           



                                                  Refill(s)           

 

     Diltiazem      2019      Yes                      180 mg = 1           [facility]

[facility]



     Hydrochlori      0-25                               cap, PO,           l



     de       15:07:                               Daily, #           [facility]

[facility]



     mg/24 hours      00                                 30 cap, 0           



     oral                                         Refill(s)           



     capsule,                                                        



     extended                                                        



     release                                                        

 

     tramadol      2019      Yes                      150 mg = 1           [facility]

[facility]



     150 mg/24      0-25                               cap, PO,           l



     hours oral      15:07:                               Daily, 0           [facility]

[facility]



     capsule,      00                                 Refill(s)           



     extended                                                        



     release                                                        

 

     Acetaminoph      2019      Yes                      1 tab, PO,           

[facility]



     en 325 MG /      0-25                               Q6H, 0           l



     Hydrocodone      15:07:                               Refill(s)           [facility]

[facility]



     Bitartrate      00                                                



     5 MG Oral                                                        



     Tablet                                                        



     [Norco                                                        



     5/325]                                                        

 

     apixaban 5      2019      Yes                      5 mg, PO,           [facility]

[facility]



     MG Oral      0-25                               Q12H, 0           l



     Tablet      15:07:                               Refill(s)           [facility]

n



     [Eliquis]      00                                                

 

     metoprolol      2019      Yes                      50 mg = 1           [facility]

[facility]



     tartrate 50      0-25                               tab, PO,           l



     mg oral      15:07:                               BID, # 180           [facility]

daryl



     tablet      00                                 tab, 0           



                                                  Refill(s)           

 

     Diltiazem      2019      Yes                      180 mg = 1           Me

[facility]



     Hydrochlori      0-25                               cap, PO,           l



     de       15:07:                               Daily, #           [facility]

d[facility]



     mg/24 hours      00                                 30 cap, 0           



     oral                                         Refill(s)           



     capsule,                                                        



     extended                                                        



     release                                                        

 

     tramadol      2019      Yes                      150 mg = 1           [facility]

[facility]



     150 mg/24      0-25                               cap, PO,           l



     hours oral      15:07:                               Daily, 0           Her

[facility]



     capsule,      00                                 Refill(s)           



     extended                                                        



     release                                                        

 

     Acetaminoph      2019      Yes                      1 tab, PO,           

[facility]



     en 325 MG /      0-25                               Q6H, 0           l



     Hydrocodone      15:07:                               Refill(s)           H

[facility]



     Bitartrate      00                                                



     5 MG Oral                                                        



     Tablet                                                        



     [Norco                                                        



     5/325]                                                        

 

     apixaban 5      2019      Yes                      5 mg, PO,           Me

[facility]



     MG Oral      0-25                               Q12H, 0           l



     Tablet      15:07:                               Refill(s)           Donny

n



     [Eliquis]      00                                                

 

     metoprolol      2019      Yes                      50 mg = 1           Me

[facility]



     tartrate 50      0-25                               tab, PO,           l



     mg oral      15:07:                               BID, # 180           [facility]

daryl



     tablet      00                                 tab, 0           



                                                  Refill(s)           

 

     Diltiazem      2019      Yes                      180 mg = 1           Me

[facility]



     Hydrochlori      0-25                               cap, PO,           l



     de       15:07:                               Daily, #           [facility]

daryl



     mg/24 hours      00                                 30 cap, 0           



     oral                                         Refill(s)           



     capsule,                                                        



     extended                                                        



     release                                                        

 

     tramadol      2019      Yes                      150 mg = 1           [facility]

[facility]



     150 mg/24      0-25                               cap, PO,           l



     hours oral      15:07:                               Daily, 0           Her

hernandes



     capsule,      00                                 Refill(s)           



     extended                                                        



     release                                                        

 

     Acetaminoph      2019      Yes                      1 tab, PO,           

[facility]



     en 325 MG /      0-25                               Q6H, 0           l



     Hydrocodone      15:07:                               Refill(s)           H

[facility]



     Bitartrate      00                                                



     5 MG Oral                                                        



     Tablet                                                        



     [Norco                                                        



     5/325]                                                        

 

     apixaban 5      2019      Yes                      5 mg, PO,           Me

[facility]



     MG Oral      0-25                               Q12H, 0           l



     Tablet      15:07:                               Refill(s)           oDnny martinez



     [Eliquis]      00                                                

 

     metoprolol      2019      Yes                      50 mg = 1           Me

[facility]



     tartrate 50      0-25                               tab, PO,           l



     mg oral      15:07:                               BID, # 180           [facility]

daryl



     tablet      00                                 tab, 0           



                                                  Refill(s)           

 

     Diltiazem      2019      Yes                      180 mg = 1           Me

[facility]



     Hydrochlori      0-25                               cap, PO,           l



     de       15:07:                               Daily, #           [facility]

daryl



     mg/24 hours      00                                 30 cap, 0           



     oral                                         Refill(s)           



     capsule,                                                        



     extended                                                        



     release                                                        

 

     tramadol      2019      Yes                      150 mg = 1           [facility]

[facility]



     150 mg/24      0-25                               cap, PO,           l



     hours oral      15:07:                               Daily, 0           Her

hernandes



     capsule,      00                                 Refill(s)           



     extended                                                        



     release                                                        

 

     Acetaminoph      2019      Yes                      1 tab, PO,           

[facility]



     en 325 MG /      0-25                               Q6H, 0           l



     Hydrocodone      15:07:                               Refill(s)           H

[facility]



     Bitartrate      00                                                



     5 MG Oral                                                        



     Tablet                                                        



     [Norco                                                        



     5/325]                                                        

 

     apixaban 5      2019      Yes                      5 mg, PO,           Me

[facility]



     MG Oral      0-25                               Q12H, 0           l



     Tablet      15:07:                               Refill(s)           Donny martinez



     [Eliquis]      00                                                

 

     metoprolol      2019      Yes                      50 mg = 1           Me

moria



     tartrate 50      0-25                               tab, PO,           l



     mg oral      15:07:                               BID, # 180           [facility]

daryl



     tablet      00                                 tab, 0           



                                                  Refill(s)           

 

     Diltiazem      2019      Yes                      180 mg = 1           Me

[facility]



     Hydrochlori      0-25                               cap, PO,           l



     de       15:07:                               Daily, #           [facility]

daryl



     mg/24 hours      00                                 30 cap, 0           



     oral                                         Refill(s)           



     capsule,                                                        



     extended                                                        



     release                                                        

 

     tramadol      2019      Yes                      150 mg = 1           [facility]

[facility]



     150 mg/24      0-25                               cap, PO,           l



     hours oral      15:07:                               Daily, 0           Her

hernandes



     capsule,      00                                 Refill(s)           



     extended                                                        



     release                                                        

 

     Acetaminoph      2019      Yes                      1 tab, PO,           

[facility]



     en 325 MG /      0-25                               Q6H, 0           l



     Hydrocodone      15:07:                               Refill(s)           H

[facility]



     Bitartrate      00                                                



     5 MG Oral                                                        



     Tablet                                                        



     [Norco                                                        



     5/325]                                                        

 

     calcitriol      2019      Yes                      = 1 cap,           [facility]

[facility]



     0.25 mcg      0-11                               PO, Daily,           l



     oral      21:10:                               # 90           [place]



     capsule      30                                 unknown           



                                                  unit,           



                                                  Pharmacy:           



                                                  [facility]           



                                                  #[id number]           

 

     calcitriol      2019      Yes                      = 1 cap,           [facility]

[facility]



     0.25 mcg      0-11                               PO, Daily,           l



     oral      21:10:                               # 90           [place]



     capsule      30                                 unknown           



                                                  unit,           



                                                  Pharmacy:           



                                                  [facility]           



                                                  #[id number]           

 

     calcitriol      2019      Yes                      = 1 cap,           [facility]

[facility]



     0.25 mcg      0-11                               PO, Daily,           l



     oral      21:10:                               # 90           [place]



     capsule      30                                 unknown           



                                                  unit,           



                                                  Pharmacy:           



                                                  [facility]           



                                                  #[id number]           

 

     calcitriol      2019      Yes                      = 1 cap,           [facility]

[facility]



     0.25 mcg      0-11                               PO, Daily,           l



     oral      21:10:                               # 90           [place]



     capsule      30                                 unknown           



                                                  unit,           



                                                  Pharmacy:           



                                                  [facility]           



                                                  #[id number]           

 

     calcitriol      2019      Yes                      = 1 cap,           [facility]

[facility]



     0.25 mcg      0-11                               PO, Daily,           l



     oral      21:10:                               # 90           [place]



     capsule      30                                 unknown           



                                                  unit,           



                                                  Pharmacy:           



                                                  [facility]           



                                                  [facility]           



                                                  #[id number]           

 

     gabapentin            Yes                      300 mg = 1           M

[facility]



     300 MG Oral      9-27                               cap, PO,           l



     Capsule      20:54:                               BID, # 180           [facility]

daryl



               00                                 cap, 3           



                                                  Refill(s),           



                                                  called to           



                                                  pharmacy           

 

     gabapentin      2019-0      Yes                      300 mg = 1           M

[facility]



     300 MG Oral      9-27                               cap, PO,           l



     Capsule      20:54:                               BID, # 180           [facility]

daryl



               00                                 cap, 3           



                                                  Refill(s),           



                                                  called to           



                                                  pharmacy           

 

     gabapentin      2019-0      Yes                      300 mg = 1           M

[facility]



     300 MG Oral      9-27                               cap, PO,           l



     Capsule      20:54:                               BID, # 180           [facility]

daryl



               00                                 cap, 3           



                                                  Refill(s),           



                                                  called to           



                                                  pharmacy           

 

     gabapentin      2019-0      Yes                      300 mg = 1           [facility]

[facility]



     300 MG Oral      9-27                               cap, PO,           l



     Capsule      20:54:                               BID, # 180           [facility]

daryl



               00                                 cap, 3           



                                                  Refill(s),           



                                                  called to           



                                                  pharmacy           

 

     gabapentin      2019-0      Yes                      300 mg = 1           M

[facility]



     300 MG Oral      9-27                               cap, PO,           l



     Capsule      20:54:                               BID, # 180           [facility]

daryl



               00                                 cap, 3           



                                                  Refill(s),           



                                                  called to           



                                                  pharmacy           

 

     allopurinol      2019-0      Yes                      = 1 tab,           Me

moria



     300 mg oral      8-17                               PO, Daily,           l



     tablet      02:39:                               # 90 tabDonny

n



                                                Pharmacy:           



                                                  [facility]           



                                                  [facility] [place]           



                                                  #[id number]           

 

     allopurinol      2019-0      Yes                      = 1 tab,           Me

moria



     300 mg oral      8-17                               PO, Daily,           l



     tablet      02:39:                               # 90 tabDonny

n



                                                Pharmacy:           



                                                  [facility]           



                                                  [facility]           



                                                  #[id number]           

 

     allopurinol      2019-0      Yes                      = 1 tab,           Me

moria



     300 mg oral      8-17                               PO, Daily,           l



     tablet      02:39:                               # 90 tabDonny

n



                                                Pharmacy:           



                                                  [facility]           



                                                  [facility]           



                                                  #[id number]           

 

     allopurinol      2019-0      Yes                      = 1 tab,           Me

moria



     300 mg oral      8-17                               PO, Daily,           l



     tablet      02:39:                               # 90 tabDonny

n



               31                                 Pharmacy:           



                                                  [facility]           



                                                  [facility]           



                                                  #[id number]           

 

     allopurinol      2019-0      Yes                      = 1 tab,           Me

moria



     300 mg oral      8-17                               PO, Daily,           l



     tablet      02:39:                               # 90 tabDonny

n



                                                Pharmacy:           



                                                  [facility]           



                                                  [facility]           



                                                  #[id number]           

 

     QUEtiapine      2019-0      Yes                      50 mg = 1           Me

moria



     50 mg oral      6-06                               tab, PO,           l



     tablet      15:28:                               Bedtime, #           [facility]

nn



               00                                 30 tab, 1           



                                                  Refill(s)           

 

     QUEtiapine      2019-0      Yes                      50 mg = 1           Me

moria



     50 mg oral      6-06                               tab, PO,           l



     tablet      15:28:                               Bedtime, #           [facility]

nn



               00                                 30 tab, 1           



                                                  Refill(s)           

 

     QUEtiapine      -      Yes                      50 mg = 1           Me

moria



     50 mg oral      6-06                               tab, PO,           l



     tablet      15:28:                               Bedtime, #           [facility]

nn



               00                                 30 tab, 1           



                                                  Refill(s)           

 

     QUEtiapine            Yes                      50 mg = 1           Me

moria



     50 mg oral      6-06                               tab, PO,           l



     tablet      15:28:                               Bedtime, #           [facility]

nn



               00                                 30 tab, 1           



                                                  Refill(s)           

 

     QUEtiapine      -      Yes                      50 mg = 1           Me

moria



     50 mg oral      6-06                               tab, PO,           l



     tablet      15:28:                               Bedtime, #           [facility]

nn



               00                                 30 tab, 1           



                                                  Refill(s)           

 

     eletriptan            Yes                      40 mg = 1           Me

moria



     40 mg oral      6-06                               tab, PO,           l



     tablet      15:26:                               Daily, PRN           [facility]

nn



               00                                 for            



                                                  migraine           



                                                  headache,           



                                                  may repeat           



                                                  dose once           



                                                  in 2           



                                                  hours, # 6           



                                                  tab, 0           



                                                  Refill(s)           

 

     eletriptan            Yes                      40 mg = 1           Me

moria



     40 mg oral      6-06                               tab, PO,           l



     tablet      15:26:                               Daily, PRN           [facility]

nn



               00                                 for            



                                                  migraine           



                                                  headache,           



                                                  may repeat           



                                                  dose once           



                                                  in 2           



                                                  hours, # 6           



                                                  tab, 0           



                                                  Refill(s)           

 

     eletriptan      -      Yes                      40 mg = 1           Me

moria



     40 mg oral      6-06                               tab, PO,           l



     tablet      15:26:                               Daily, PRN           [facility]

nn



               00                                 for            



                                                  migraine           



                                                  headache,           



                                                  may repeat           



                                                  dose once           



                                                  in 2           



                                                  hours, # 6           



                                                  tab, 0           



                                                  Refill(s)           

 

     eletriptan      -      Yes                      40 mg = 1           Me

moria



     40 mg oral      6-06                               tab, PO,           l



     tablet      15:26:                               Daily, PRN           [facility]

nn



               00                                 for            



                                                  migraine           



                                                  headache,           



                                                  may repeat           



                                                  dose once           



                                                  in 2           



                                                  hours, # 6           



                                                  tab, 0           



                                                  Refill(s)           

 

     eletriptan      -      Yes                      40 mg = 1           Me

moria



     40 mg oral      6-06                               tab, PO,           l



     tablet      15:26:                               Daily, PRN           [facility]

nn



               00                                 for            



                                                  migraine           



                                                  headache,           



                                                  may repeat           



                                                  dose once           



                                                  in 2           



                                                  hours, # 6           



                                                  tab, 0           



                                                  Refill(s)           

 

     atorvastati            Yes                      See            [facility]

a



     n 40 mg      3-14                               Instructio           l



     oral tablet      15:07:                               ns, TAKE 1           

[place]



               35                                 TABLET BY           



                                                  MOUTH           



                                                  EVERY           



                                                  NIGHT AT           



                                                  BEDTIME, #           



                                                  90 tab, 1           



                                                  Refill(s),           



                                                  Pharmacy:           



                                                  [address]            Yes                      See            [facility]

a



     n 40 mg      3-14                               Instructio           l



     oral tablet      15:07:                               ns, TAKE 1           

[place]



               35                                 TABLET BY           



                                                  MOUTH           



                                                  EVERY           



                                                  NIGHT AT           



                                                  BEDTIME, #           



                                                  90 tab, 1           



                                                  Refill(s),           



                                                  Pharmacy:           



                                                  [address]            Yes                      See            [facility]

a



     n 40 mg      3-14                               Instructio           l



     oral tablet      15:07:                               ns, TAKE 1           

[place]



               35                                 TABLET BY           



                                                  MOUTH           



                                                  EVERY           



                                                  NIGHT AT           



                                                  BEDTIME, #           



                                                  90 tab, 1           



                                                  Refill(s),           



                                                  Pharmacy:           



                                                  [address]            Yes                      See            [facility]

a



     n 40 mg      3-14                               Instructio           l



     oral tablet      15:07:                               ns, TAKE 1           

[place]



               35                                 TABLET BY           



                                                  MOUTH           



                                                  EVERY           



                                                  NIGHT AT           



                                                  BEDTIME, #           



                                                  90 tab, 1           



                                                  Refill(s),           



                                                  Pharmacy:           



                                                  [address]            Yes                      See            [facility]

a



     n 40 mg      3-14                               Instructio           l



     oral tablet      15:07:                               ns, TAKE 1           

[place]



               35                                 TABLET BY           



                                                  MOUTH           



                                                  EVERY           



                                                  NIGHT AT           



                                                  BEDTIME, #           



                                                  90 tab, 1           



                                                  Refill(s),           



                                                  Pharmacy:           



                                                  [place] [id number]           

 

     amLODIPine            Yes                      See            [facility]



     5 mg oral      3-14                               Instructio           l



     tablet      15:07:                               ns, TAKE 1           [facility]

nn



               33                                 TABLET BY           



                                                  MOUTH           



                                                  EVERY DAY,           



                                                  # 90 tab,           



                                                  1              



                                                  Refill(s),           



                                                  Pharmacy:           



                                                  [place] [address]           

 

     amLODIPine            Yes                      See            [facility]



     5 mg oral      3-14                               Instructio           l



     tablet      15:07:                               ns, TAKE 1           [facility]

nn



               33                                 TABLET BY           



                                                  MOUTH           



                                                  EVERY DAY,           



                                                  # 90 tab,           



                                                  1              



                                                  Refill(s),           



                                                  Pharmacy:           



                                                  [place] [address]           

 

     amLODIPine            Yes                      See            [facility]



     5 mg oral      3-14                               Instructio           l



     tablet      15:07:                               ns, TAKE 1           [facility]

nn



               33                                 TABLET BY           



                                                  MOUTH           



                                                  EVERY DAY,           



                                                  # 90 tab,           



                                                  1              



                                                  Refill(s),           



                                                  Pharmacy:           



                                                  [place] [address]           

 

     amLODIPine            Yes                      See            [facility]



     5 mg oral      3-14                               Instructio           l



     tablet      15:07:                               ns, TAKE 1           [facility]

[facility]



               33                                 TABLET BY           



                                                  MOUTH           



                                                  EVERY DAY,           



                                                  # 90 tab,           



                                                  1              



                                                  Refill(s),           



                                                  Pharmacy:           



                                                  [facility]           



                                                  [facility]           



                                                  [id number]           

 

     amLODIPine            Yes                      See            [facility]



     5 mg oral      3-14                               Instructio           l



     tablet      15:07:                               ns, TAKE 1           [facility]

nn



               33                                 TABLET BY           



                                                  MOUTH           



                                                  EVERY DAY,           



                                                  # 90 tab,           



                                                  1              



                                                  Refill(s),           



                                                  Pharmacy:           



                                                  [facility]           



                                                  [facility]           



                                                  [id number]           

 

     lisinopril            Yes                      See            [facility]



     5 mg oral      8-21                               Instructio           l



     tablet      19:00:                               ns, TAKE 1           [facility]

nn



               30                                 TABLET BY           



                                                  MOUTH           



                                                  DAILY, #           



                                                  90 tab, 1           



                                                  Refill(s),           



                                                  Pharmacy:           



                                                  [facility]           



                                                  [facility]           



                                                  [id number]           

 

     lisinopril            Yes                      See            [facility]



     5 mg oral      8-21                               Instructio           l



     tablet      19:00:                               ns, TAKE 1           [facility]

[facility]



               30                                 TABLET BY           



                                                  MOUTH           



                                                  DAILY, #           



                                                  90 tab, 1           



                                                  Refill(s),           



                                                  Pharmacy:           



                                                  [facility]           



                                                  [facility]           



                                                  [id number]           

 

     lisinopril            Yes                      See            [facility]



     5 mg oral      8-21                               Instructio           l



     tablet      19:00:                               ns, TAKE 1           [facility]

[facility]



               30                                 TABLET BY           



                                                  MOUTH           



                                                  DAILY, #           



                                                  90 tab, 1           



                                                  Refill(s),           



                                                  Pharmacy:           



                                                  [facility]           



                                                  [facility]           



                                                  [id number]           

 

     lisinopril            Yes                      See            [facility]



     5 mg oral      8-21                               Instructio           l



     tablet      19:00:                               ns, TAKE 1           [facility]

[facility]



               30                                 TABLET BY           



                                                  MOUTH           



                                                  DAILY, #           



                                                  90 tab, 1           



                                                  Refill(s),           



                                                  Pharmacy:           



                                                  [facility]           



                                                  [facility]           



                                                  [id number]           

 

     lisinopril            Yes                      See            [facility]



     5 mg oral      8-21                               Instructio           l



     tablet      19:00:                               ns, TAKE 1           [facility]

nn



               30                                 TABLET BY           



                                                  MOUTH           



                                                  DAILY, #           



                                                  90 tab, 1           



                                                  Refill(s),           



                                                  Pharmacy:           



                                                  [facility]           



                                                  [facility]           



                                                  [id number]           

 

     atorvastati            Yes                      See            [facility]

a



     n 40 mg      8-21                               Instructio           l



     oral tablet      18:50:                               ns, TAKE 1           

[place]



               45                                 TABLET BY           



                                                  MOUTH           



                                                  EVERY           



                                                  NIGHT AT           



                                                  BEDTIME, #           



                                                  30 tab, 5           



                                                  Refill(s),           



                                                  Pharmacy:           



                                                  [facility]           



                                                  [facility]           



                                                  [id number]           

 

     atorvastati            Yes                      See            [facility]

a



     n 40 mg      8-21                               Instructio           l



     oral tablet      18:50:                               ns, TAKE 1           

[place]



               45                                 TABLET BY           



                                                  MOUTH           



                                                  EVERY           



                                                  NIGHT AT           



                                                  BEDTIME, #           



                                                  30 tab, 5           



                                                  Refill(s),           



                                                  Pharmacy:           



                                                  [facility]           



                                                  [facility]           



                                                  [id number]           

 

     atorvastati            Yes                      See            [facility]

a



     n 40 mg      8-21                               Instructio           l



     oral tablet      18:50:                               ns, TAKE 1           

[place]



               45                                 TABLET BY           



                                                  MOUTH           



                                                  EVERY           



                                                  NIGHT AT           



                                                  BEDTIME, #           



                                                  30 tab, 5           



                                                  Refill(s),           



                                                  Pharmacy:           



                                                  [facility]           



                                                  [facility]           



                                                  [id number]           

 

     atorvastati            Yes                      See            [facility]

a



     n 40 mg      8-21                               Instructio           l



     oral tablet      18:50:                               ns, TAKE 1           

[place]



               45                                 TABLET BY           



                                                  MOUTH           



                                                  EVERY           



                                                  NIGHT AT           



                                                  BEDTIME, #           



                                                  30 tab, 5           



                                                  Refill(s),           



                                                  Pharmacy:           



                                                  [place] [id number]           

 

     atorvastati            Yes                      See            [facility]

a



     n 40 mg      8-21                               Instructio           l



     oral tablet      18:50:                               ns, TAKE 1           

[place]



               45                                 TABLET BY           



                                                  MOUTH           



                                                  EVERY           



                                                  NIGHT AT           



                                                  BEDTIME, #           



                                                  30 tab, 5           



                                                  Refill(s),           



                                                  Pharmacy:           



                                                  [place] [address]           

 

     amLODIPine            Yes                      See            [facility]



     5 mg oral      8-21                               Instructio           l



     tablet      18:50:                               ns, TAKE 1           [facility]

nn



               41                                 TABLET BY           



                                                  MOUTH           



                                                  EVERY DAY,           



                                                  # 30 tab,           



                                                  5              



                                                  Refill(s),           



                                                  Pharmacy:           



                                                  [place] [address]           

 

     amLODIPine            Yes                      See            [facility]



     5 mg oral      8-21                               Instructio           l



     tablet      18:50:                               ns, TAKE 1           [facility]

nn



               41                                 TABLET BY           



                                                  MOUTH           



                                                  EVERY DAY,           



                                                  # 30 tab,           



                                                  5              



                                                  Refill(s),           



                                                  Pharmacy:           



                                                  [facility]           



                                                  [facility]           



                                                  [id number]           

 

     amLODIPine            Yes                      See            [facility]



     5 mg oral      8-21                               Instructio           l



     tablet      18:50:                               ns, TAKE 1           [facility]

nn



               41                                 TABLET BY           



                                                  MOUTH           



                                                  EVERY DAY,           



                                                  # 30 tab,           



                                                  5              



                                                  Refill(s),           



                                                  Pharmacy:           



                                                  [facility]           



                                                  [facility]           



                                                  [id number]           

 

     amLODIPine            Yes                      See            [facility]



     5 mg oral      8-21                               Instructio           l



     tablet      18:50:                               ns, TAKE 1           [facility]

nn



               41                                 TABLET BY           



                                                  MOUTH           



                                                  EVERY DAY,           



                                                  # 30 tab,           



                                                  5              



                                                  Refill(s),           



                                                  Pharmacy:           



                                                  [facility]           



                                                  [facility]           



                                                  [id number]           

 

     amLODIPine            Yes                      See            [facility]



     5 mg oral      8-21                               Instructio           l



     tablet      18:50:                               ns, TAKE 1           [facility]

nn



               41                                 TABLET BY           



                                                  MOUTH           



                                                  EVERY DAY,           



                                                  # 30 tab,           



                                                  5              



                                                  Refill(s),           



                                                  Pharmacy:           



                                                  [place] [address]           

 

     lisinopril      0      No                       See            [facility]



     5 mg oral      7-31                               Instructio           l



     tablet      15:28:                               ns, # 90           [place]



               34                                 tab, TAKE           



                                                  1 TABLET           



                                                  BY MOUTH           



                                                  DAILY,           



                                                  Pharmacy:           



                                                  [facility]           



                                                  [facility]           



                                                  [id number]           

 

     lisinopril      0      No                       See            [facility]



     5 mg oral      7-31                               Instructio           l



     tablet      15:28:                               ns, # 90           [place]



               34                                 tab, TAKE           



                                                  1 TABLET           



                                                  BY MOUTH           



                                                  DAILY,           



                                                  Pharmacy:           



                                                  [facility]           



                                                  [facility]           



                                                  [id number]           

 

     lisinopril            No                       See            [facility]



     5 mg oral      7-31                               Instructio           l



     tablet      15:28:                               ns, # 90           [place]



               34                                 tab, TAKE           



                                                  1 TABLET           



                                                  BY MOUTH           



                                                  DAILY,           



                                                  Pharmacy:           



                                                  [facility]           



                                                  [facility]           



                                                  [id number]           

 

     lisinopril            No                       See            [facility]



     5 mg oral      7-31                               Instructio           l



     tablet      15:28:                               ns, # 90           [place]



               34                                 tab, TAKE           



                                                  1 TABLET           



                                                  BY MOUTH           



                                                  DAILY,           



                                                  Pharmacy:           



                                                  [facility]           



                                                  [facility]           



                                                  [id number]           

 

     lisinopril            No                       See            [facility]



     5 mg oral      7-31                               Instructio           l



     tablet      15:28:                               ns, # 90           [place]



               34                                 tab, TAKE           



                                                  1 TABLET           



                                                  BY MOUTH           



                                                  DAILY,           



                                                  Pharmacy:           



                                                  [facility]           



                                                  [facility]           



                                                  [id number]           

 

     allopurinol            No                       300 mg = 1           

[facility]



     300 mg oral      5-10                               tab, PO,           l



     tablet      13:59:                               Daily, #           [place]



               00                                 90 tab, 1           



                                                  Refill(s),           



                                                  Pharmacy:           



                                                  [facility]           



                                                  [facility]           



                                                  [id number]           

 

     allopurinol            No                       300 mg = 1           

[facility]



     300 mg oral      5-10                               tab, PO,           l



     tablet      13:59:                               Daily, #           [place]



               00                                 90 tab, 1           



                                                  Refill(s),           



                                                  Pharmacy:           



                                                  [facility]           



                                                  [facility]           



                                                  [id number]           

 

     allopurinol            No                       300 mg = 1           

[facility]



     300 mg oral      5-10                               tab, PO,           l



     tablet      13:59:                               Daily, #           [place]



               00                                 90 tab, 1           



                                                  Refill(s),           



                                                  Pharmacy:           



                                                  [facility]           



                                                  [facility]           



                                                  [id number]           

 

     allopurinol            No                       300 mg = 1           

[facility]



     300 mg oral      5-10                               tab, PO,           l



     tablet      13:59:                               Daily, #           [place]



               00                                 90 tab, 1           



                                                  Refill(s),           



                                                  Pharmacy:           



                                                  [facility]           



                                                  [facility]           



                                                  [id number]           

 

     allopurinol            No                       300 mg = 1           

[facility]



     300 mg oral      5-10                               tab, PO,           l



     tablet      13:59:                               Daily, #           [place]



               00                                 90 tab, 1           



                                                  Refill(s),           



                                                  Pharmacy:           



                                                  [facility]           



                                                  [facility]           



                                                  [id number]           

 

     lisinopril      0      No                       See            [facility]



     5 mg oral      5-01                               Instructio           l



     tablet      12:38:                               ns, # 90           [place]



               18                                 tab, TAKE           



                                                  1 TABLET           



                                                  BY MOUTH           



                                                  DAILY,           



                                                  Pharmacy:           



                                                  [facility]           



                                                  [facility]           



                                                  [id number]           

 

     ALLOPURINOL      0      Yes                      See            [facility]

a



     300MG      5-01                               Instructio           l



     TABLETS      12:38:                               ns, # 90           [place]

n



               18                                 tab, TAKE           



                                                  1 TABLET           



                                                  BY MOUTH           



                                                  DAILY,           



                                                  Pharmacy:           



                                                  [facility]           



                                                  [facility]           



                                                  [id number]           

 

     lisinopril      0      No                       See            [facility]



     5 mg oral      5-01                               Instructio           l



     tablet      12:38:                               ns, # 90           [place]



               18                                 tab, TAKE           



                                                  1 TABLET           



                                                  BY MOUTH           



                                                  DAILY,           



                                                  Pharmacy:           



                                                  [facility]           



                                                  [facility]           



                                                  [id number]           

 

     ALLOPURINOL            Yes                      See            [facility]

a



     300MG      5-01                               Instructio           l



     TABLETS      12:38:                               ns, # 90           [place]

n



               18                                 tab, TAKE           



                                                  1 TABLET           



                                                  BY MOUTH           



                                                  DAILY,           



                                                  Pharmacy:           



                                                  [facility]           



                                                  [facility]           



                                                  [id number]           

 

     lisinopril            No                       See            [facility]



     5 mg oral      5-01                               Instructio           l



     tablet      12:38:                               ns, # 90           [place]



               18                                 tab, TAKE           



                                                  1 TABLET           



                                                  BY MOUTH           



                                                  DAILY,           



                                                  Pharmacy:           



                                                  [facility]           



                                                  [facility]           



                                                  [id number]           

 

     ALLOPURINOL      0      Yes                      See            [facility]

a



     300MG      5-01                               Instructio           l



     TABLETS      12:38:                               ns, # 90           [place]

n



               18                                 tab, TAKE           



                                                  1 TABLET           



                                                  BY MOUTH           



                                                  DAILY,           



                                                  Pharmacy:           



                                                  [facility]           



                                                  [facility]           



                                                  [id number]           

 

     lisinopril      0      No                       See            [facility]



     5 mg oral      5-01                               Instructio           l



     tablet      12:38:                               ns, # 90           [place]



               18                                 tab, TAKE           



                                                  1 TABLET           



                                                  BY MOUTH           



                                                  DAILY,           



                                                  Pharmacy:           



                                                  [facility]           



                                                  [facility]           



                                                  [id number]           

 

     ALLOPURINOL      [address]      Yes                      See            [facility]

a



     300MG      5-01                               Instructio           l



     TABLETS      12:38:                               ns, # 90           [place]

n



               18                                 tab, TAKE           



                                                  1 TABLET           



                                                  BY MOUTH           



                                                  DAILY,           



                                                  Pharmacy:           



                                                  [facility]           



                                                  [facility]           



                                                  [id number]           

 

     lisinopril      0      No                       See            [facility]



     5 mg oral      5-01                               Instructio           l



     tablet      12:38:                               ns, # 90           [place]



               18                                 tab, TAKE           



                                                  1 TABLET           



                                                  BY MOUTH           



                                                  DAILY,           



                                                  Pharmacy:           



                                                  [facility]           



                                                  [facility]           



                                                  [id number]           

 

     ALLOPURINOL      [address]      Yes                      See            [facility]

a



     300MG      5-01                               Instructio           l



     TABLETS      12:38:                               ns, # 90           [place]

n



               18                                 tab, TAKE           



                                                  1 TABLET           



                                                  BY MOUTH           



                                                  DAILY,           



                                                  Pharmacy:           



                                                  [facility]           



                                                  [facility]           



                                                  [id number]           

 

     calcitriol            Yes                      0.25           [facility]



     0.25 mcg      3-28                               microgram           l



     oral      13:49:                               = 1 cap,           [place]



     capsule      00                                 PO, Daily,           



                                                  # 90 cap,           



                                                  3              



                                                  Refill(s),           



                                                  Pharmacy:           



                                                  [facility]           



                                                  [facility]           



                                                  [id number]           

 

     calcitriol            Yes                      0.25           [facility]



     0.25 mcg      3-28                               microgram           l



     oral      13:49:                               = 1 cap,           [place]



     capsule      00                                 PO, Daily,           



                                                  # 90 cap,           



                                                  3              



                                                  Refill(s),           



                                                  Pharmacy:           



                                                  [facility]           



                                                  [facility]           



                                                  [id number]           

 

     calcitriol            Yes                      0.25           [facility]



     0.25 mcg      3-28                               microgram           l



     oral      13:49:                               = 1 cap,           [place]



     capsule      00                                 PO, Daily,           



                                                  # 90 cap,           



                                                  3              



                                                  Refill(s),           



                                                  Pharmacy:           



                                                  [facility]           



                                                  [facility]           



                                                  [id number]           

 

     calcitriol            Yes                      0.25           [facility]



     0.25 mcg      3-28                               microgram           l



     oral      13:49:                               = 1 cap,           [place]



     capsule      00                                 PO, Daily,           



                                                  # 90 cap,           



                                                  3              



                                                  Refill(s),           



                                                  Pharmacy:           



                                                  [facility]           



                                                  [facility]           



                                                  [id number]           

 

     calcitriol            Yes                      0.25           [facility]



     0.25 mcg      3-28                               microgram           l



     oral      13:49:                               = 1 cap,           [place]



     capsule      00                                 PO, Daily,           



                                                  # 90 cap,           



                                                  3              



                                                  Refill(s),           



                                                  Pharmacy:           



                                                  [facility]           



                                                  [facility]           



                                                  [id number]           

 

     Mupirocin            Yes                      1 appl,           [facility]

ia



     0.02 MG/MG      3-21                               TOP, BID,           l



     Topical      15:37:                               30 grams           [place]

n



     Ointment      21                                 3each, # 3           



                                                  ea, 1           



                                                  Refill(s),           



                                                  Pharmacy:           



                                                  [facility]           



                                                  [facility]           



                                                  [id number]           

 

     Mupirocin            Yes                      1 appl,           [facility]

ia



     0.02 MG/MG      3-21                               TOP, BID,           l



     Topical      15:37:                               30 grams           [place]

n



     Ointment      21                                 3each, # 3           



                                                  ea, 1           



                                                  Refill(s),           



                                                  Pharmacy:           



                                                  [facility]           



                                                  [facility]           



                                                  [id number]           

 

     Mupirocin            Yes                      1 appl,           [facility]

ia



     0.02 MG/MG      3-21                               TOP, BID,           l



     Topical      15:37:                               30 grams           [place]

n



     Ointment      21                                 3each, # 3           



                                                  ea, 1           



                                                  Refill(s),           



                                                  Pharmacy:           



                                                  [facility]           



                                                  [facility]           



                                                  [id number]           

 

     Mupirocin            Yes                      1 appl,           [facility]

ia



     0.02 MG/MG      3-21                               TOP, BID,           l



     Topical      15:37:                               30 grams           [place]

n



     Ointment      21                                 3each, # 3           



                                                  ea, 1           



                                                  Refill(s),           



                                                  Pharmacy:           



                                                  [facility]           



                                                  [facility]           



                                                  [id number]           

 

     Mupirocin            Yes                      1 appl,           [facility]

ia



     0.02 MG/MG      3-21                               TOP, BID,           l



     Topical      15:37:                               30 grams           [place]

n



     Ointment      21                                 3each, # 3           



                                                  ea, 1           



                                                  Refill(s),           



                                                  Pharmacy:           



                                                  [facility]           



                                                  [facility]           



                                                  [id number]           

 

     atorvastati            Yes                      See            [facility]

a



     n 40 mg      3-21                               Instructio           l



     oral tablet      15:36:                               ns, TAKE 1           

[place]



               22                                 TABLET BY           



                                                  MOUTH           



                                                  EVERY           



                                                  NIGHT AT           



                                                  BEDTIME, #           



                                                  30 tab, 5           



                                                  Refill(s),           



                                                  Pharmacy:           



                                                  [facility]           



                                                  [facility]           



                                                  [id number]           

 

     atorvastati            Yes                      See            [facility]

a



     n 40 mg      3-21                               Instructio           l



     oral tablet      15:36:                               ns, TAKE 1           

[place]



               22                                 TABLET BY           



                                                  MOUTH           



                                                  EVERY           



                                                  NIGHT AT           



                                                  BEDTIME, #           



                                                  30 tab, 5           



                                                  Refill(s),           



                                                  Pharmacy:           



                                                  [facility]           



                                                  [facility]           



                                                  [id number]           

 

     atorvastati            Yes                      See            [facility]

a



     n 40 mg      3-21                               Instructio           l



     oral tablet      15:36:                               ns, TAKE 1           

[place]



               22                                 TABLET BY           



                                                  MOUTH           



                                                  EVERY           



                                                  NIGHT AT           



                                                  BEDTIME, #           



                                                  30 tab, 5           



                                                  Refill(s),           



                                                  Pharmacy:           



                                                  [facility]           



                                                  [facility]           



                                                  [id number]           

 

     atorvastati            Yes                      See            [facility]

a



     n 40 mg      3-21                               Instructio           l



     oral tablet      15:36:                               ns, TAKE 1           

[place]



               22                                 TABLET BY           



                                                  MOUTH           



                                                  EVERY           



                                                  NIGHT AT           



                                                  BEDTIME, #           



                                                  30 tab, 5           



                                                  Refill(s),           



                                                  Pharmacy:           



                                                  [facility]           



                                                  [facility]           



                                                  [id number]           

 

     atorvastati            Yes                      See            [facility]

a



     n 40 mg      3-21                               Instructio           l



     oral tablet      15:36:                               ns, TAKE 1           

[place]



               22                                 TABLET BY           



                                                  MOUTH           



                                                  EVERY           



                                                  NIGHT AT           



                                                  BEDTIME, #           



                                                  30 tab, 5           



                                                  Refill(s),           



                                                  Pharmacy:           



                                                  [facility]           



                                                  [facility]           



                                                  [id number]           

 

     amLODIPine            Yes                      See            [facility]



     5 mg oral      3-21                               Instructio           l



     tablet      15:36:                               ns, TAKE 1           [place]

nn



               18                                 TABLET BY           



                                                  MOUTH           



                                                  EVERY DAY,           



                                                  # 30 tab,           



                                                  5              



                                                  Refill(s),           



                                                  Pharmacy:           



                                                  [facility]           



                                                  [facility]           



                                                  [id number]           

 

     amLODIPine            Yes                      See            [facility]



     5 mg oral      3-21                               Instructio           l



     tablet      15:36:                               ns, TAKE 1           [place]

nn



               18                                 TABLET BY           



                                                  MOUTH           



                                                  EVERY DAY,           



                                                  # 30 tab,           



                                                  5              



                                                  Refill(s),           



                                                  Pharmacy:           



                                                  [facility]           



                                                  [facility]           



                                                  [facility]           

 

     amLODIPine            Yes                      See            [facility]



     5 mg oral      3-21                               Instructio           l



     tablet      15:36:                               ns, TAKE 1           [place]

nn



               18                                 TABLET BY           



                                                  MOUTH           



                                                  EVERY DAY,           



                                                  # 30 tab,           



                                                  5              



                                                  Refill(s),           



                                                  Pharmacy:           



                                                  [facility]           



                                                  [facility]           



                                                  [facility]           

 

     amLODIPine            Yes                      See            [facility]



     5 mg oral      3-21                               Instructio           l



     tablet      15:36:                               ns, TAKE 1           [place]

nn



               18                                 TABLET BY           



                                                  MOUTH           



                                                  EVERY DAY,           



                                                  # 30 tab,           



                                                  5              



                                                  Refill(s),           



                                                  Pharmacy:           



                                                  [facility]           



                                                  [facility]           



                                                  [id number]           

 

     amLODIPine            Yes                      See            [facility]



     5 mg oral      3-21                               Instructio           l



     tablet      15:36:                               ns, TAKE 1           [place]

nn



               18                                 TABLET BY           



                                                  MOUTH           



                                                  EVERY DAY,           



                                                  # 30 tab,           



                                                  5              



                                                  Refill(s),           



                                                  Pharmacy:           



                                                  [facility]           



                                                  [facility]           



                                                  [id number]           

 

     aspirin 81      2017-      Yes                      81 mg = 1           Me

[facility]



     mg tablet,      1-24                               tab, PO,           l



     enteric      16:34:                               Daily, #           [facility]

n



     coated      00                                 90 tab, 3           



                                                  Refill(s)           

 

     aspirin 81      2017-0      Yes                      81 mg = 1           Me

moria



     mg tablet,      1-24                               tab, PO,           l



     enteric      16:34:                               Daily, #           [facility]

n



     coated      00                                 90 tab, 3           



                                                  Refill(s)           

 

     aspirin 81      2017-0      Yes                      81 mg = 1           Me

moria



     mg tablet,      1-24                               tab, PO,           l



     enteric      16:34:                               Daily, #           [facility]

n



     coated      00                                 90 tab, 3           



                                                  Refill(s)           

 

     aspirin 81      2017-0      Yes                      81 mg = 1           Me

[facility]ia



     mg tablet,      1-24                               tab, PO,           l



     enteric      16:34:                               Daily, #           [facility]

n



     coated      00                                 90 tab, 3           



                                                  Refill(s)           

 

     aspirin 81      2017-0      Yes                      81 mg = 1           Me

[facility]ia



     mg tablet,      1-24                               tab, PO,           l



     enteric      16:34:                               Daily, #           [facility]

n



     coated      00                                 90 tab, 3           



                                                  Refill(s)           

 

     Xopenex      0      No   Ghassan K                0.63 mg =           Mem

[facility]



     0.63 mg/3      3-11           Chun                3 mL,           l



     mL        01:00:                               Soln, NEB,           [place]



     inhalation      00                                 Once,           



     solution                                         first dose           



                                                  03/10/16           



                                                  19:00:00           



                                                  CST, stop           



                                                  date           



                                                  03/10/16           



                                                  19:00:00           



                                                  CST            

 

     Xopenex            No   Ghassan K                0.63 mg =           [facility]

[facility]



     0.63 mg/3      3-11           Chnu                3 mL,           l



     mL        01:00:                               Soln, NEB,           [place]



     inhalation      00                                 Once,           



     solution                                         first dose           



                                                  03/10/16           



                                                  19:00:00           



                                                  CST, stop           



                                                  date           



                                                  03/10/16           



                                                  19:00:00           



                                                  CST            

 

     Xopenex            No   Ghassan K                0.63 mg =           [facility]

[facility]



     0.63 mg/3      3-11           [place]un                3 mL,           l



     mL        01:00:                               Soln, NEB,           [place]



     inhalation      00                                 Once,           



     solution                                         first dose           



                                                  03/10/16           



                                                  19:00:00           



                                                  CST, stop           



                                                  date           



                                                  03/10/16           



                                                  19:00:00           



                                                  CST            

 

     Xopenex            No   Ghassan K                0.63 mg =           Mem

[facility]



     0.63 mg/3      3-11           Chun                3 mL,           l



     mL        01:00:                               Soln, NEB,           [place]



     inhalation      00                                 Once,           



     solution                                         first dose           



                                                  03/10/16           



                                                  19:00:00           



                                                  CST, stop           



                                                  date           



                                                  03/10/16           



                                                  19:00:00           



                                                  CST            

 

     Xopenex            No   Ghassan K                0.63 mg =           Mem

[facility]



     0.63 mg/3      3-11           [place]                3 mL,           l



     mL        01:00:                               Soln, NEB,           [place]



     inhalation      00                                 Once,           



     solution                                         first dose           



                                                  03/10/16           



                                                  19:00:00           



                                                  CST, stop           



                                                  date           



                                                  03/10/16           



                                                  19:00:00           



                                                  CST            

 

     Misc      0      No   Deuce                300 mL,           [facility]



     Medication      3-11           [place]                Soln-IV,           l



               00:03:                               IV, Once,           [place]



               00                                 first dose           



                                                  03/10/16           



                                                  18:03:00           



                                                  CST, stop           



                                                  date           



                                                  03/10/16           



                                                  18:03:00           



                                                  CST            

 

     Misc      0      No   Deuce                300 mL,           [facility]



     Medication      3-11           Wheat                Soln-IV,           l



               00:03:                               IV, Once,           [place]



               00                                 first dose           



                                                  03/10/16           



                                                  18:03:00           



                                                  CST, stop           



                                                  date           



                                                  03/10/16           



                                                  18:03:00           



                                                  CST            

 

     Misc            No   Deuce                300 mL,           [facility]



     Medication      3-11           Wheat                Soln-IV,           l



               00:03:                               IV, Once,           [place]



                                                first dose           



                                                  03/10/16           



                                                  18:03:00           



                                                  CST, stop           



                                                  date           



                                                  03/10/16           



                                                  18:03:00           



                                                  CST            

 

     Misc            No   Deuce                300 mL,           [facility]



     Medication      3-11           [place]                Soln-IV,           l



               00:03:                               IV, Once,           [place]



                                                first dose           



                                                  03/10/16           



                                                  18:03:00           



                                                  CST, stop           



                                                  date           



                                                  03/10/16           



                                                  18:03:00           



                                                  CST            

 

     Misc            No   Deuce                300 mL,           [facility]



     Medication      3-11           [place]                Soln-IV,           l



               00:03:                               IV, Once,           [place]                                 first dose           



                                                  03/10/16           



                                                  18:03:00           



                                                  CST, stop           



                                                  date           



                                                  03/10/16           



                                                  18:03:00           



                                                  CST            

 

     albuterol            No   Ghassan K                2.5 mg = 3           

[facility]



     2.5 mg/3 mL      3-11           [facility]                mL, Soln,           

l



     (0.083%)      00:02:                               NEB, Once           [facility]

d[facility]



     inhalation      00                                 PRN for           



     solution                                         wheezing,           



                                                  first dose           



                                                  03/10/16           



                                                  18:02:00           



                                                  CST            

 

     Demerol HCl            No   Ghassan K                12.5 mg =          

 [facility]



               3-11           [facility]                0.25 mL,           l



               00:02:                               Injection,           [place]



               00                                 IV Push,           



                                                  Once PRN           



                                                  for            



                                                  shivers,           



                                                  first dose           



                                                  03/10/16           



                                                  18:02:00           



                                                  CST            

 

     ondansetron            No   Ghassan K                4 mg = 2           

[facility]



               3-11           [place]                mL,            l



               00:02:                               Injection,           [place]



               00                                 IV Push,           



                                                  q15min PRN           



                                                  for            



                                                  nausea/vom           



                                                  iting,           



                                                  order           



                                                  duration:           



                                                  2 doses,           



                                                  first dose           



                                                  03/10/16           



                                                  18:02:00           



                                                  CST, stop           



                                                  date           



                                                  Limited #           



                                                  of times           

 

     Dilaudid            No   Ghassan K                0.5 mg =           [facility]

[facility]



               3-11           [facility]un                0.25 mL,           l



               00:02:                               Injection,           [place]



               00                                 IV Push,           



                                                  q10min PRN           



                                                  for pain           



                                                  severe           



                                                  (7-10),           



                                                  first dose           



                                                  03/10/16           



                                                  18:02:00           



                                                  CST            

 

     diphenhydrA            No   Ghassan K                25 mg =           M

[facility]



     MINE      3-11           Chun                0.5 mL,           l



               00:02:                               Injection,           [place]



               00                                 IV Push,           



                                                  Once PRN           



                                                  for            



                                                  itching,           



                                                  first dose           



                                                  03/10/16           



                                                  18:02:00           



                                                  CST            

 

     LR 1,000 mL            No   Ghassan K                1,000 mL,          

 [facility]



               3-11           [facility]                IV, 75           l



               00:02:                               mL/hr,           [facility]



               00                                 start date           



                                                  03/10/16           



                                                  18:02:00           



                                                  CST            

 

     Saline Lock            No   Ghassan K                10 mL,           Me

[facility]



     Flush      3-11           Chun                Soln, IV           l



               00:02:                               Push, As           [place]



               00                                 Indicated           



                                                  PRN for           



                                                  flush,           



                                                  first dose           



                                                  03/10/16           



                                                  18:02:00           



                                                  CST            

 

     Bupivacaine            No   Ghassan K                300 mL,           M

[facility]



     0.25% 300      3-11           Chun                Nerve           l



     mL pump 300      00:02:                               Block, 5           He

[facility]



     mL        00                                 mL/hr,           



                                                  start date           



                                                  03/10/16           



                                                  18:02:00           



                                                  CST            

 

     promethazin            No   Ghassan K                12.5 mg =          

 [facility]



     e         3-11           Chun                0.5 mL,           l



               00:02:                               Injection,           [place]



               00                                 IM, Once           



                                                  PRN for           



                                                  severe           



                                                  nausea,           



                                                  first dose           



                                                  03/10/16           



                                                  18:02:00           



                                                  CST            

 

     albuterol            No   Ghassan K                2.5 mg = 3           

[facility]



     2.5 mg/3 mL      3-11           Chun                mL, Soln,           

l



     (0.083%)      00:02:                               NEB, Once           [facility]

daryl



     inhalation      00                                 PRN for           



     solution                                         wheezing,           



                                                  first dose           



                                                  03/10/16           



                                                  18:02:00           



                                                  CST            

 

     Demerol HCl            No   Ghassan K                12.5 mg =          

 [facility]



               3-11           Chun                0.25 mL,           l



               00:02:                               Injection,           [place]



               00                                 IV Push,           



                                                  Once PRN           



                                                  for            



                                                  shivers,           



                                                  first dose           



                                                  03/10/16           



                                                  18:02:00           



                                                  CST            

 

     ondansetron            No   Ghassan K                4 mg = 2           

[facility]



               3-11           Chun                mL,            l



               00:02:                               Injection,           [place]



               00                                 IV Push,           



                                                  q15min PRN           



                                                  for            



                                                  nausea/vom           



                                                  iting,           



                                                  order           



                                                  duration:           



                                                  2 doses,           



                                                  first dose           



                                                  03/10/16           



                                                  18:02:00           



                                                  CST, stop           



                                                  date           



                                                  Limited #           



                                                  of times           

 

     Dilaudid            No   Ghassan K                0.5 mg =           [facility]

[facility]



               3-11           Chun                0.25 mL,           l



               00:02:                               Injection,           [place]



               00                                 IV Push,           



                                                  q10min PRN           



                                                  for pain           



                                                  severe           



                                                  (7-10),           



                                                  first dose           



                                                  03/10/16           



                                                  18:02:00           



                                                  CST            

 

     diphenhydrA            No   Ghassan K                25 mg =           M

[facility]



     MINE      3-11           Chun                0.5 mL,           l



               00:02:                               Injection,           [place]



               00                                 IV Push,           



                                                  Once PRN           



                                                  for            



                                                  itching,           



                                                  first dose           



                                                  03/10/16           



                                                  18:02:00           



                                                  CST            

 

     LR 1,000 mL            No   Ghassan K                1,000 mL,          

 [facility]



               3-11           Chun                IV, 75           l



               00:02:                               mL/hr,           [place]



               00                                 start date           



                                                  03/10/16           



                                                  18:02:00           



                                                  CST            

 

     Saline Lock            No   Ghassan K                10 mL,           Me

[facility]



     Flush      3-11           Chun                Soln, IV           l



               00:02:                               Push, As           [place]



               00                                 Indicated           



                                                  PRN for           



                                                  flush,           



                                                  first dose           



                                                  03/10/16           



                                                  18:02:00           



                                                  CST            

 

     Bupivacaine            No   Ghassan K                300 mL,           M

[facility]



     0.25% 300      3-11           Chun                Nerve           l



     mL pump 300      00:02:                               Block, 5           He

[facility]



     mL        00                                 mL/hr,           



                                                  start date           



                                                  03/10/16           



                                                  18:02:00           



                                                  CST            

 

     promethazin            No   Ghassan K                12.5 mg =          

 [facility]



     e         3-11           Chun                0.5 mL,           l



               00:02:                               Injection,           [place]



               00                                 IM, Once           



                                                  PRN for           



                                                  severe           



                                                  nausea,           



                                                  first dose           



                                                  03/10/16           



                                                  18:02:00           



                                                  CST            

 

     albuterol            No   Ghassan K                2.5 mg = 3           

[facility]



     2.5 mg/3 mL      3-11           Chun                mL, Soln,           

l



     (0.083%)      00:02:                               NEB, Once           [facility]

d[facility]



     inhalation      00                                 PRN for           



     solution                                         wheezing,           



                                                  first dose           



                                                  03/10/16           



                                                  18:02:00           



                                                  CST            

 

     Demerol HCl            No   Ghassan K                12.5 mg =          

 [facility]



               3-11           Chun                0.25 mL,           l



               00:02:                               Injection,           [place]



               00                                 IV Push,           



                                                  Once PRN           



                                                  for            



                                                  shivers,           



                                                  first dose           



                                                  03/10/16           



                                                  18:02:00           



                                                  CST            

 

     ondansetron            No   Ghassan K                4 mg = 2           

[facility]



               3-11           Chun                mL,            l



               00:02:                               Injection,           [place]



               00                                 IV Push,           



                                                  q15min PRN           



                                                  for            



                                                  nausea/vom           



                                                  iting,           



                                                  order           



                                                  duration:           



                                                  2 doses,           



                                                  first dose           



                                                  03/10/16           



                                                  18:02:00           



                                                  CST, stop           



                                                  date           



                                                  Limited #           



                                                  of times           

 

     Dilaudid            No   Ghassan K                0.5 mg =           [facility]

[facility]



               3-11           Chun                0.25 mL,           l



               00:02:                               Injection,           [place]



               00                                 IV Push,           



                                                  q10min PRN           



                                                  for pain           



                                                  severe           



                                                  (7-10),           



                                                  first dose           



                                                  03/10/16           



                                                  18:02:00           



                                                  CST            

 

     diphenhydrA            No   Ghassan K                25 mg =           M

[facility]



     MINE      3-11           Chun                0.5 mL,           l



               00:02:                               Injection,           [place]



               00                                 IV Push,           



                                                  Once PRN           



                                                  for            



                                                  itching,           



                                                  first dose           



                                                  03/10/16           



                                                  18:02:00           



                                                  CST            

 

     LR 1,000 mL            No   Ghassan K                1,000 mL,          

 [facility]



               3-11           Chun                IV, 75           l



               00:02:                               mL/hr,           [place]



               00                                 start date           



                                                  03/10/16           



                                                  18:02:00           



                                                  CST            

 

     Saline Lock            No   Ghassan K                10 mL,           Me

[facility]



     Flush      3-11           Chun                Soln, IV           l



               00:02:                               Push, As           [place]



               00                                 Indicated           



                                                  PRN for           



                                                  flush,           



                                                  first dose           



                                                  03/10/16           



                                                  18:02:00           



                                                  CST            

 

     Bupivacaine            No   Ghassan K                300 mL,           M

[facility]



     0.25% 300      3-11           Chun                Nerve           l



     mL pump 300      00:02:                               Block, 5           He

[facility]



     mL        00                                 mL/hr,           



                                                  start date           



                                                  03/10/16           



                                                  18:02:00           



                                                  CST            

 

     promethazin            No   Ghassan K                12.5 mg =          

 [facility]



     e         3-11           Chun                0.5 mL,           l



               00:02:                               Injection,           [place]



               00                                 IM, Once           



                                                  PRN for           



                                                  severe           



                                                  nausea,           



                                                  first dose           



                                                  03/10/16           



                                                  18:02:00           



                                                  CST            

 

     albuterol            No   Ghassan K                2.5 mg = 3           

[facility]



     2.5 mg/3 mL      3-11           Chun                mL, Soln,           

l



     (0.083%)      00:02:                               NEB, Once           [facility]

[facility]



     inhalation      00                                 PRN for           



     solution                                         wheezing,           



                                                  first dose           



                                                  03/10/16           



                                                  18:02:00           



                                                  CST            

 

     Demerol HCl            No   Ghassan K                12.5 mg =          

 [facility]



               3-11           Chun                0.25 mL,           l



               00:02:                               Injection,           [place]



               00                                 IV Push,           



                                                  Once PRN           



                                                  for            



                                                  shivers,           



                                                  first dose           



                                                  03/10/16           



                                                  18:02:00           



                                                  CST            

 

     ondansetron            No   Ghassan K                4 mg = 2           

[facility]



               3-11           Chun                mL,            l



               00:02:                               Injection,           [place]



               00                                 IV Push,           



                                                  q15min PRN           



                                                  for            



                                                  nausea/vom           



                                                  iting,           



                                                  order           



                                                  duration:           



                                                  2 doses,           



                                                  first dose           



                                                  03/10/16           



                                                  18:02:00           



                                                  CST, stop           



                                                  date           



                                                  Limited #           



                                                  of times           

 

     Dilaudid            No   Ghassan K                0.5 mg =           [facility]

[facility]



               3-11           Chun                0.25 mL,           l



               00:02:                               Injection,           [place]



               00                                 IV Push,           



                                                  q10min PRN           



                                                  for pain           



                                                  severe           



                                                  (7-10),           



                                                  first dose           



                                                  03/10/16           



                                                  18:02:00           



                                                  CST            

 

     diphenhydrA            No   Ghassan K                25 mg =           M

[facility]



     MINE      3-11           Chun                0.5 mL,           l



               00:02:                               Injection,           [place]



               00                                 IV Push,           



                                                  Once PRN           



                                                  for            



                                                  itching,           



                                                  first dose           



                                                  03/10/16           



                                                  18:02:00           



                                                  CST            

 

     LR 1,000 mL            No   Ghassan K                1,000 mL,          

 [facility]



               3-11           Chun                IV, 75           l



               00:02:                               mL/hr,           [place]



               00                                 start date           



                                                  03/10/16           



                                                  18:02:00           



                                                  CST            

 

     Saline Lock            No   Ghassan K                10 mL,           Me

[facility]



     Flush      3-11           Chun                Soln, IV           l



               00:02:                               Push, As           [place]



               00                                 Indicated           



                                                  PRN for           



                                                  flush,           



                                                  first dose           



                                                  03/10/16           



                                                  18:02:00           



                                                  CST            

 

     Bupivacaine            No   Ghassan K                300 mL,           M

[facility]



     0.25% 300      3-11           Chun                Nerve           l



     mL pump 300      00:02:                               Block, 5           He

[facility]



     mL        00                                 mL/hr,           



                                                  start date           



                                                  03/10/16           



                                                  18:02:00           



                                                  CST            

 

     promethazin            No   Ghassan K                12.5 mg =          

 [facility]



     e         3-11           Chun                0.5 mL,           l



               00:02:                               Injection,           [place]



               00                                 IM, Once           



                                                  PRN for           



                                                  severe           



                                                  nausea,           



                                                  first dose           



                                                  03/10/16           



                                                  18:02:00           



                                                  CST            

 

     albuterol            No   Ghassan K                2.5 mg = 3           

[facility]



     2.5 mg/3 mL      3-11           Chun                mL, Soln,           

l



     (0.083%)      00:02:                               NEB, Once           [facility]

daryl



     inhalation      00                                 PRN for           



     solution                                         wheezing,           



                                                  first dose           



                                                  03/10/16           



                                                  18:02:00           



                                                  CST            

 

     Demerol HCl            No   Ghassan K                12.5 mg =          

 [facility]



               3-11           Chun                0.25 mL,           l



               00:02:                               Injection,           [place]



               00                                 IV Push,           



                                                  Once PRN           



                                                  for            



                                                  shivers,           



                                                  first dose           



                                                  03/10/16           



                                                  18:02:00           



                                                  CST            

 

     ondansetron            No   Ghassan K                4 mg = 2           

[facility]



               3-11           Chun                mL,            l



               00:02:                               Injection,           [place]



               00                                 IV Push,           



                                                  q15min PRN           



                                                  for            



                                                  nausea/vom           



                                                  iting,           



                                                  order           



                                                  duration:           



                                                  2 doses,           



                                                  first dose           



                                                  03/10/16           



                                                  18:02:00           



                                                  CST, stop           



                                                  date           



                                                  Limited #           



                                                  of times           

 

     Dilaudid            No   Ghassan K                0.5 mg =           [facility]

[facility]



               3-11           Chun                0.25 mL,           l



               00:02:                               Injection,           [place]



               00                                 IV Push,           



                                                  q10min PRN           



                                                  for pain           



                                                  severe           



                                                  (7-10),           



                                                  first dose           



                                                  03/10/16           



                                                  18:02:00           



                                                  CST            

 

     diphenhydrA            No   Ghassan K                25 mg =           M

[facility]



     MINE      3-11           Chun                0.5 mL,           l



               00:02:                               Injection,           [place]



               00                                 IV Push,           



                                                  Once PRN           



                                                  for            



                                                  itching,           



                                                  first dose           



                                                  03/10/16           



                                                  18:02:00           



                                                  CST            

 

     LR 1,000 mL            No   Ghassan K                1,000 mL,          

 [facility]



               3-11           Chun                IV, 75           l



               00:02:                               mL/hr,           [place]



               00                                 start date           



                                                  03/10/16           



                                                  18:02:00           



                                                  CST            

 

     Saline Lock            No   Ghassan K                10 mL,           Me

[facility]



     Flush      3-11           Chun                Soln, IV           l



               00:02:                               Push, As           [place]



               00                                 Indicated           



                                                  PRN for           



                                                  flush,           



                                                  first dose           



                                                  03/10/16           



                                                  18:02:00           



                                                  CST            

 

     Bupivacaine            No   Ghassan K                300 mL,           M

[facility]



     0.25% 300      3-11           Chun                Nerve           l



     mL pump 300      00:02:                               Block, 5           He

[facility]



     mL        00                                 mL/hr,           



                                                  start date           



                                                  03/10/16           



                                                  18:02:00           



                                                  CST            

 

     promethazin            No   Ghassan K                12.5 mg =          

 [facility]



     e         3-11           Chun                0.5 mL,           l



               00:02:                               Injection,           [place]



               00                                 IM, Once           



                                                  PRN for           



                                                  severe           



                                                  nausea,           



                                                  first dose           



                                                  03/10/16           



                                                  18:02:00           



                                                  CST            

 

     Misc            No   Deuce                1,000 mL,           [facility]

a



     Medication      3-10           [facility]                Soln-IV,           l



               23:42:                               IV, Once,           [place]



                                                first dose           



                                                  03/10/16           



                                                  17:42:00           



                                                  CST, stop           



                                                  date           



                                                  03/10/16           



                                                  17:42:00           



                                                  CST            

 

     Misc            No   Deuce                1,000 mL,           [facility]

a



     Medication      3-10           [place]                Soln-IV,           l



               23:42:                               IV, Once,           [place]                                 first dose           



                                                  03/10/16           



                                                  17:42:00           



                                                  CST, stop           



                                                  date           



                                                  03/10/16           



                                                  17:42:00           



                                                  CST            

 

     Misc            No   Deuce                1,000 mL,           [facility]

a



     Medication      3-10           [place]                Soln-IV,           l



               23:42:                               IV, Once,                                            first dose           



                                                  03/10/16           



                                                  17:42:00           



                                                  CST, stop           



                                                  date           



                                                  03/10/16           



                                                  17:42:00           



                                                  CST            

 

     Misc            No   Deuce                1,000 mL,           [facility]

a



     Medication      3-10           [place]                Soln-IV,           l



               23:42:                               IV, Once,           [place]                                 first dose           



                                                  03/10/16           



                                                  17:42:00           



                                                  CST, stop           



                                                  date           



                                                  03/10/16           



                                                  17:42:00           



                                                  CST            

 

     Misc            No   Deuce                1,000 mL,           [facility]

a



     Medication      3-10           [place]                Soln-IV,           l



               23:42:                               IV, Once,                                            first dose           



                                                  03/10/16           



                                                  17:42:00           



                                                  CST, stop           



                                                  date           



                                                  03/10/16           



                                                  17:42:00           



                                                  CST            

 

     fentaNYL            No   Deuce                25 mcg =           [facility]

[facility]



               3-10           [place]                0.5 mL,           l



               23:20:                               Injection,           [place]



               00                                 IV, Once,           



                                                  first dose           



                                                  03/10/16           



                                                  17:20:00           



                                                  CST, stop           



                                                  date           



                                                  03/10/16           



                                                  17:20:00           



                                                  CST            

 

     fentaNYL            No   Deuce                25 mcg =           [facility]

[facility]



               3-10           [place]                0.5 mL,           l



               23:20:                               Injection,           [place]



               00                                 IV, Once,           



                                                  first dose           



                                                  03/10/16           



                                                  17:20:00           



                                                  CST, stop           



                                                  date           



                                                  03/10/16           



                                                  17:20:00           



                                                  CST            

 

     fentaNYL            No   Deuce                25 mcg =           [facility]

[facility]



               3-10           [place]                0.5 mL,           l



               23:20:                               Injection,           [place]



               00                                 IV, Once,           



                                                  first dose           



                                                  03/10/16           



                                                  17:20:00           



                                                  CST, stop           



                                                  date           



                                                  03/10/16           



                                                  17:20:00           



                                                  CST            

 

     fentaNYL      2016-0      No   Deuce                25 mcg =           [facility]

[facility]



               3-10           [place]                0.5 mL,           l



               23:20:                               Injection,           [place]



               00                                 IV, Once,           



                                                  first dose           



                                                  03/10/16           



                                                  17:20:00           



                                                  CST, stop           



                                                  date           



                                                  03/10/16           



                                                  17:20:00           



                                                  CST            

 

     fentaNYL      2016-0      No   Deuce                25 mcg =           [facility]

[facility]



               3-10           [place]                0.5 mL,           l



               23:20:                               Injection,           [place]



               00                                 IV, Once,           



                                                  first dose           



                                                  03/10/16           



                                                  17:20:00           



                                                  CST, stop           



                                                  date           



                                                  03/10/16           



                                                  17:20:00           



                                                  CST            

 

     ondansetron      -0      No   Deuce                4 mg = 2           

[facility]



               3-10           [place]                mL,            l



               23:03:                               Injection,           [place]



               00                                 IV, Once,           



                                                  first dose           



                                                  03/10/16           



                                                  17:03:00           



                                                  CST, stop           



                                                  date           



                                                  03/10/16           



                                                  17:03:00           



                                                  CST            

 

     ondansetron      -0      No   Deuce                4 mg = 2           

[facility]



               3-10           [place]                mL,            l



               23:03:                               Injection,           [place]



               00                                 IV, Once,           



                                                  first dose           



                                                  03/10/16           



                                                  17:03:00           



                                                  CST, stop           



                                                  date           



                                                  03/10/16           



                                                  17:03:00           



                                                  CST            

 

     ondansetron      -0      No   Deuce                4 mg = 2           

[facility]



               3-10           [place]                mL,            l



               23:03:                               Injection,           [place]



               00                                 IV, Once,           



                                                  first dose           



                                                  03/10/16           



                                                  17:03:00           



                                                  CST, stop           



                                                  date           



                                                  03/10/16           



                                                  17:03:00           



                                                  CST            

 

     ondansetron      2016-0      No   Deuce                4 mg = 2           

[facility]



               3-10           [place]                mL,            l



               23:03:                               Injection,           [place]



               00                                 IV, Once,           



                                                  first dose           



                                                  03/10/16           



                                                  17:03:00           



                                                  CST, stop           



                                                  date           



                                                  03/10/16           



                                                  17:03:00           



                                                  CST            

 

     ondansetron      -0      No   Deuce                4 mg = 2           

[facility]



               3-10           [place]                mL,            l



               23:03:                               Injection,           [place]



               00                                 IV, Once,           



                                                  first dose           



                                                  03/10/16           



                                                  17:03:00           



                                                  CST, stop           



                                                  date           



                                                  03/10/16           



                                                  17:03:00           



                                                  CST            

 

     fentaNYL      2016-0      No   Deuce                25 mcg =           [facility]

[facility]



               3-10           [place]                0.5 mL,           l



               23:00:                               Injection,           [place]



               00                                 IV, Once,           



                                                  first dose           



                                                  03/10/16           



                                                  17:00:00           



                                                  CST, stop           



                                                  date           



                                                  03/10/16           



                                                  17:00:00           



                                                  CST            

 

     fentaNYL      -0      No   Deuce                25 mcg =           [facility]

[facility]



               3-10           [place]                0.5 mL,           l



               23:00:                               Injection,           [place]



               00                                 IV, Once,           



                                                  first dose           



                                                  03/10/16           



                                                  17:00:00           



                                                  CST, stop           



                                                  date           



                                                  03/10/16           



                                                  17:00:00           



                                                  CST            

 

     fentaNYL      -0      No   Deuce                25 mcg =           [facility]

[facility]



               3-10           [place]                0.5 mL,           l



               23:00:                               Injection,           [place]



               00                                 IV, Once,           



                                                  first dose           



                                                  03/10/16           



                                                  17:00:00           



                                                  CST, stop           



                                                  date           



                                                  03/10/16           



                                                  17:00:00           



                                                  CST            

 

     fentaNYL      -0      No   Deuce                25 mcg =           [facility]

[facility]



               3-10           [place]                0.5 mL,           l



               23:00:                               Injection,           [place]



               00                                 IV, Once,           



                                                  first dose           



                                                  03/10/16           



                                                  17:00:00           



                                                  CST, stop           



                                                  date           



                                                  03/10/16           



                                                  17:00:00           



                                                  CST            

 

     fentaNYL      -0      No   Deuce                25 mcg =           [facility]

[facility]



               3-10           [place]                0.5 mL,           l



               23:00:                               Injection,           [place]



               00                                 IV, Once,           



                                                  first dose           



                                                  03/10/16           



                                                  17:00:00           



                                                  CST, stop           



                                                  date           



                                                  03/10/16           



                                                  17:00:00           



                                                  CST            

 

     fentaNYL      2016-0      No   Deuce                25 mcg =           [facility]

[facility]



               3-10           Wheat                0.5 mL,           l



               22:48:                               Injection,           [place]



               00                                 IV, Once,           



                                                  first dose           



                                                  03/10/16           



                                                  16:48:00           



                                                  CST, stop           



                                                  date           



                                                  03/10/16           



                                                  16:48:00           



                                                  CST            

 

     fentaNYL      2016-0      No   Deuce                25 mcg =           [facility]

[facility]



               3-10           Wheat                0.5 mL,           l



               22:48:                               Injection,           [place]



               00                                 IV, Once,           



                                                  first dose           



                                                  03/10/16           



                                                  16:48:00           



                                                  CST, stop           



                                                  date           



                                                  03/10/16           



                                                  16:48:00           



                                                  CST            

 

     fentaNYL      -0      No   Deuce                25 mcg =           [facility]

[facility]



               3-10           [place]heat                0.5 mL,           l



               22:48:                               Injection,           [place]



               00                                 IV, Once,           



                                                  first dose           



                                                  03/10/16           



                                                  16:48:00           



                                                  CST, stop           



                                                  date           



                                                  03/10/16           



                                                  16:48:00           



                                                  CST            

 

     fentaNYL      -0      No   Deuce                25 mcg =           [facility]

[facility]



               3-10           [place]                0.5 mL,           l



               22:48:                               Injection,           [place]



               00                                 IV, Once,           



                                                  first dose           



                                                  03/10/16           



                                                  16:48:00           



                                                  CST, stop           



                                                  date           



                                                  03/10/16           



                                                  16:48:00           



                                                  CST            

 

     fentaNYL      -0      No   Deuce                25 mcg =           [facility]

[facility]



               3-10           [place]                0.5 mL,           l



               22:48:                               Injection,           [place]



               00                                 IV, Once,           



                                                  first dose           



                                                  03/10/16           



                                                  16:48:00           



                                                  CST, stop           



                                                  date           



                                                  03/10/16           



                                                  16:48:00           



                                                  CST            

 

     fentaNYL      -0      No   Deuce                25 mcg =           [facility]

[facility]



               3-10           [place]heat                0.5 mL,           l



               22:37:                               Injection,           [place]



               00                                 IV, Once,           



                                                  first dose           



                                                  03/10/16           



                                                  16:37:00           



                                                  CST, stop           



                                                  date           



                                                  03/10/16           



                                                  16:37:00           



                                                  CST            

 

     fentaNYL      -0      No   Deuce                25 mcg =           [facility]

[facility]



               3-10           [place]heat                0.5 mL,           l



               22:37:                               Injection,           [place]



               00                                 IV, Once,           



                                                  first dose           



                                                  03/10/16           



                                                  16:37:00           



                                                  CST, stop           



                                                  date           



                                                  03/10/16           



                                                  16:37:00           



                                                  CST            

 

     fentaNYL      -0      No   Deuce                25 mcg =           [facility]

[facility]



               3-10           [place]                0.5 mL,           l



               22:37:                               Injection,           [place]



               00                                 IV, Once,           



                                                  first dose           



                                                  03/10/16           



                                                  16:37:00           



                                                  CST, stop           



                                                  date           



                                                  03/10/16           



                                                  16:37:00           



                                                  CST            

 

     fentaNYL      -0      No   Deuce                25 mcg =           [facility]

[facility]



               3-10           [place]                0.5 mL,           l



               22:37:                               Injection,           [place]



               00                                 IV, Once,           



                                                  first dose           



                                                  03/10/16           



                                                  16:37:00           



                                                  CST, stop           



                                                  date           



                                                  03/10/16           



                                                  16:37:00           



                                                  CST            

 

     fentaNYL      -0      No   Deuce                25 mcg =           [facility]

[facility]



               3-10           [place]                0.5 mL,           l



               22:37:                               Injection,           [place]



               00                                 IV, Once,           



                                                  first dose           



                                                  03/10/16           



                                                  16:37:00           



                                                  CST, stop           



                                                  date           



                                                  03/10/16           



                                                  16:37:00           



                                                  CST            

 

     dexamethaso      -0      No   [facility]uce                8 mg = 2           

[facility]



     ne        3-10           [place]                mL,            l



               22:23:                               Injection,           [place]



               00                                 IV, Once,           



                                                  first dose           



                                                  03/10/16           



                                                  16:23:00           



                                                  CST, stop           



                                                  date           



                                                  03/10/16           



                                                  16:23:00           



                                                  CST            

 

     dexamethaso      -0      No   [place]                8 mg = 2           

[facility]



     ne        3-10           [place]                mL,            l



               22:23:                               Injection,           [place]



               00                                 IV, Once,           



                                                  first dose           



                                                  03/10/16           



                                                  16:23:00           



                                                  CST, stop           



                                                  date           



                                                  03/10/16           



                                                  16:23:00           



                                                  CST            

 

     dexamethaso      2016-0      No   [place]                8 mg = 2           

[facility]



     ne        3-10           [place]                mL,            l



               22:23:                               Injection,           [place]



               00                                 IV, Once,           



                                                  first dose           



                                                  03/10/16           



                                                  16:23:00           



                                                  CST, stop           



                                                  date           



                                                  03/10/16           



                                                  16:23:00           



                                                  CST            

 

     dexamethaso      -0      No   [place]                8 mg = 2           

[facility]



     ne        3-10           [place]                mL,            l



               22:23:                               Injection,           [place]



               00                                 IV, Once,           



                                                  first dose           



                                                  03/10/16           



                                                  16:23:00           



                                                  CST, stop           



                                                  date           



                                                  03/10/16           



                                                  16:23:00           



                                                  CST            

 

     dexamethaso      -0      No   [place]uce                8 mg = 2           

[facility]



     ne        3-10           [place]                mL,            l



               22:23:                               Injection,           [place]



               00                                 IV, Once,           



                                                  first dose           



                                                  03/10/16           



                                                  16:23:00           



                                                  CST, stop           



                                                  date           



                                                  03/10/16           



                                                  16:23:00           



                                                  CST            

 

     Misc      0      No   [facility]                1,000 mL,           [facility]

a



     Medication      3-10           [place]                Soln-IV,           l



               22:21:                               IV, Once,                                            first dose           



                                                  03/10/16           



                                                  16:21:00           



                                                  CST, stop           



                                                  date           



                                                  03/10/16           



                                                  16:21:00           



                                                  CST            

 

     Misc      -0      No   [facility]                1,000 mL,           [facility]

a



     Medication      3-10           [place]                Soln-IV,           l



               22:21:                               IV, Once,                                            first dose           



                                                  03/10/16           



                                                  16:21:00           



                                                  CST, stop           



                                                  date           



                                                  03/10/16           



                                                  16:21:00           



                                                  CST            

 

     Misc      -0      No   [facility]                1,000 mL,           [facility]

a



     Medication      3-10           [place]                Soln-IV,           l



               22:21:                               IV, Once,                                            first dose           



                                                  03/10/16           



                                                  16:21:00           



                                                  CST, stop           



                                                  date           



                                                  03/10/16           



                                                  16:21:00           



                                                  CST            

 

     Misc      -0      No   [facility]uce                1,000 mL,           [facility]

a



     Medication      3-10           [place]                Soln-IV,           l



               22:21:                               IV, Once,                                            first dose           



                                                  03/10/16           



                                                  16:21:00           



                                                  CST, stop           



                                                  date           



                                                  03/10/16           



                                                  16:21:00           



                                                  CST            

 

     Misc      -0      No   [facility]uce                1,000 mL,           [facility]

a



     Medication      3-10           [place]                Soln-IV,           l



               22:21:                               IV, Once,           [place]



               00                                 first dose           



                                                  03/10/16           



                                                  16:21:00           



                                                  CST, stop           



                                                  date           



                                                  03/10/16           



                                                  16:21:00           



                                                  CST            

 

     clindamycin      0      Yes  Deuce                928.125           [facility]

[facility]



               3-10           [place]                mg,            l



               22:18:                               Soln-IV,           [place]



               00                                 IV, Once,           



                                                  first dose           



                                                  03/10/16           



                                                  16:18:00           



                                                  CST, stop           



                                                  date           



                                                  03/10/16           



                                                  16:18:00           



                                                  CST            

 

     clindamycin      0      Yes  Deuce                928.125           [facility]

[facility]



               3-10           [place]                mg,            l



               22:18:                               Soln-IV,           [place]



               00                                 IV, Once,           



                                                  first dose           



                                                  03/10/16           



                                                  16:18:00           



                                                  CST, stop           



                                                  date           



                                                  03/10/16           



                                                  16:18:00           



                                                  CST            

 

     clindamycin            Yes  Deuce                928.125           [facility]

[facility]



               3-10           [place]                mg,            l



               22:18:                               Soln-IV,           [place]



               00                                 IV, Once,           



                                                  first dose           



                                                  03/10/16           



                                                  16:18:00           



                                                  CST, stop           



                                                  date           



                                                  03/10/16           



                                                  16:18:00           



                                                  CST            

 

     clindamycin      0      Yes  Deuce                928.125           [facility]

[facility]



               3-10           [place]                mg,            l



               22:18:                               Soln-IV,           [place]



               00                                 IV, Once,           



                                                  first dose           



                                                  03/10/16           



                                                  16:18:00           



                                                  CST, stop           



                                                  date           



                                                  03/10/16           



                                                  16:18:00           



                                                  CST            

 

     clindamycin      0      Yes  Deuce                928.125           [facility]

[facility]



               3-10           [place]                mg,            l



               22:18:                               Soln-IV,           [place]



               00                                 IV, Once,           



                                                  first dose           



                                                  03/10/16           



                                                  16:18:00           



                                                  CST, stop           



                                                  date           



                                                  03/10/16           



                                                  16:18:00           



                                                  CST            

 

     fentaNYL            No   Deuce                25 mcg =           [facility]

[facility]



               3-10           [place]                0.5 mL,           l



               22:05:                               Injection,           [place]



               00                                 IV, Once,           



                                                  first dose           



                                                  03/10/16           



                                                  16:05:00           



                                                  CST, stop           



                                                  date           



                                                  03/10/16           



                                                  16:05:00           



                                                  CST            

 

     midazolam      -0      No   Deuce                0.5 mg =           [facility]

[facility]



               3-10           [place]                0.5 mL,           l



               22:05:                               Injection,           [place]



               00                                 IV, Once,           



                                                  first dose           



                                                  03/10/16           



                                                  16:05:00           



                                                  CST, stop           



                                                  date           



                                                  03/10/16           



                                                  16:05:00           



                                                  CST            

 

     fentaNYL      2016-0      No   Deuce                25 mcg =           [facility]

[facility]



               3-10           [place]                0.5 mL,           l



               22:05:                               Injection,           [place]



               00                                 IV, Once,           



                                                  first dose           



                                                  03/10/16           



                                                  16:05:00           



                                                  CST, stop           



                                                  date           



                                                  03/10/16           



                                                  16:05:00           



                                                  CST            

 

     midazolam      -0      No   Deuce                0.5 mg =           Me

[facility]



               3-10           Wheat                0.5 mL,           l



               22:05:                               Injection,           [place]



               00                                 IV, Once,           



                                                  first dose           



                                                  03/10/16           



                                                  16:05:00           



                                                  CST, stop           



                                                  date           



                                                  03/10/16           



                                                  16:05:00           



                                                  CST            

 

     fentaNYL      -0      No   Deuce                25 mcg =           [facility]

[facility]



               3-10           Wheat                0.5 mL,           l



               22:05:                               Injection,           [place]



               00                                 IV, Once,           



                                                  first dose           



                                                  03/10/16           



                                                  16:05:00           



                                                  CST, stop           



                                                  date           



                                                  03/10/16           



                                                  16:05:00           



                                                  CST            

 

     midazolam      -0      No   Deuce                0.5 mg =           Me

[facility]



               3-10           Wheat                0.5 mL,           l



               22:05:                               Injection,           [place]



               00                                 IV, Once,           



                                                  first dose           



                                                  03/10/16           



                                                  16:05:00           



                                                  CST, stop           



                                                  date           



                                                  03/10/16           



                                                  16:05:00           



                                                  CST            

 

     fentaNYL      2016-0      No   Deuce                25 mcg =           [facility]

[facility]



               3-10           Wheat                0.5 mL,           l



               22:05:                               Injection,           [place]



               00                                 IV, Once,           



                                                  first dose           



                                                  03/10/16           



                                                  16:05:00           



                                                  CST, stop           



                                                  date           



                                                  03/10/16           



                                                  16:05:00           



                                                  CST            

 

     midazolam      -0      No   Deuce                0.5 mg =           Me

[facility]



               3-10           Wheat                0.5 mL,           l



               22:05:                               Injection,           [place]



               00                                 IV, Once,           



                                                  first dose           



                                                  03/10/16           



                                                  16:05:00           



                                                  CST, stop           



                                                  date           



                                                  03/10/16           



                                                  16:05:00           



                                                  CST            

 

     fentaNYL      2016-0      No   Deuce                25 mcg =           [facility]

[facility]



               3-10           Wheat                0.5 mL,           l



               22:05:                               Injection,           [place]



               00                                 IV, Once,           



                                                  first dose           



                                                  03/10/16           



                                                  16:05:00           



                                                  CST, stop           



                                                  date           



                                                  03/10/16           



                                                  16:05:00           



                                                  CST            

 

     midazolam      2016-0      No   Deuce                0.5 mg =           Me

[facility]



               3-10           Wheat                0.5 mL,           l



               22:05:                               Injection,           [place]



               00                                 IV, Once,           



                                                  first dose           



                                                  03/10/16           



                                                  16:05:00           



                                                  CST, stop           



                                                  date           



                                                  03/10/16           



                                                  16:05:00           



                                                  CST            

 

     lidocaine            No   Deuce                3 mL,           [facility]

[facility]



               3-10           Wheat                Injection,           l



               21:58:                               IV, Once,           [place]



               00                                 first dose           



                                                  03/10/16           



                                                  15:58:00           



                                                  CST, stop           



                                                  date           



                                                  03/10/16           



                                                  15:58:00           



                                                  CST            

 

     propofol            No   Deuce                120 mg =           [facility]

[facility]



               3-10           [place]heat                12 mL,           l



               21:58:                               Emulsion,           [place]



               00                                 IV, Once,           



                                                  first dose           



                                                  03/10/16           



                                                  15:58:00           



                                                  CST, stop           



                                                  date           



                                                  03/10/16           



                                                  15:58:00           



                                                  CST            

 

     lidocaine            No   Deuce                3 mL,           [facility]

[facility]



               3-10           Wheat                Injection,           l



               21:58:                               IV, Once,           [place]



               00                                 first dose           



                                                  03/10/16           



                                                  15:58:00           



                                                  CST, stop           



                                                  date           



                                                  03/10/16           



                                                  15:58:00           



                                                  CST            

 

     propofol            No   Deuce                120 mg =           [facility]

[facility]



               3-10           Wheat                12 mL,           l



               21:58:                               Emulsion,           [place]



               00                                 IV, Once,           



                                                  first dose           



                                                  03/10/16           



                                                  15:58:00           



                                                  CST, stop           



                                                  date           



                                                  03/10/16           



                                                  15:58:00           



                                                  CST            

 

     lidocaine            No   Deuce                3 mL,           [facility]

[facility]



               3-10           Wheat                Injection,           l



               21:58:                               IV, Once,           [place]



               00                                 first dose           



                                                  03/10/16           



                                                  15:58:00           



                                                  CST, stop           



                                                  date           



                                                  03/10/16           



                                                  15:58:00           



                                                  CST            

 

     propofol            No   Deuce                120 mg =           [facility]

[facility]



               3-10           [place]                12 mL,           l



               21:58:                               Emulsion,           [place]



               00                                 IV, Once,           



                                                  first dose           



                                                  03/10/16           



                                                  15:58:00           



                                                  CST, stop           



                                                  date           



                                                  03/10/16           



                                                  15:58:00           



                                                  CST            

 

     lidocaine            No   Deuce                3 mL,           [facility]

[facility]



               3-10           Wheat                Injection,           l



               21:58:                               IV, Once,           [place]



               00                                 first dose           



                                                  03/10/16           



                                                  15:58:00           



                                                  CST, stop           



                                                  date           



                                                  03/10/16           



                                                  15:58:00           



                                                  CST            

 

     propofol            No   Deuce                120 mg =           [facility]

[facility]



               3-10           Wheat                12 mL,           l



               21:58:                               Emulsion,           [place]



               00                                 IV, Once,           



                                                  first dose           



                                                  03/10/16           



                                                  15:58:00           



                                                  CST, stop           



                                                  date           



                                                  03/10/16           



                                                  15:58:00           



                                                  CST            

 

     lidocaine            No   Deuce                3 mL,           [facility]

[facility]



               3-10           Wheat                Injection,           l



               21:58:                               IV, Once,           [place]



               00                                 first dose           



                                                  03/10/16           



                                                  15:58:00           



                                                  CST, stop           



                                                  date           



                                                  03/10/16           



                                                  15:58:00           



                                                  CST            

 

     propofol            No   Deuce                120 mg =           [facility]

[facility]



               3-10           [place]                12 mL,           l



               21:58:                               Emulsion,           [place]



               00                                 IV, Once,           



                                                  first dose           



                                                  03/10/16           



                                                  15:58:00           



                                                  CST, stop           



                                                  date           



                                                  03/10/16           



                                                  15:58:00           



                                                  CST            

 

     midazolam            No   Deuce                0.5 mg =           [facility]

[facility]



               3-10           [place]                0.5 mL,           l



               21:50:                               Injection,           [place]



               00                                 IV, Once,           



                                                  first dose           



                                                  03/10/16           



                                                  15:50:00           



                                                  CST, stop           



                                                  date           



                                                  03/10/16           



                                                  15:50:00           



                                                  CST            

 

     fentaNYL            No   Deuce                25 mcg =           [facility]

[facility]



               3-10           [place]heat                0.5 mL,           l



               21:50:                               Injection,           [place]



               00                                 IV, Once,           



                                                  first dose           



                                                  03/10/16           



                                                  15:50:00           



                                                  CST, stop           



                                                  date           



                                                  03/10/16           



                                                  15:50:00           



                                                  CST            

 

     midazolam            No   Deuce                0.5 mg =           [facility]

[facility]



               3-10           Wheat                0.5 mL,           l



               21:50:                               Injection,           [place]



               00                                 IV, Once,           



                                                  first dose           



                                                  03/10/16           



                                                  15:50:00           



                                                  CST, stop           



                                                  date           



                                                  03/10/16           



                                                  15:50:00           



                                                  CST            

 

     fentaNYL            No   Deuce                25 mcg =           [facility]

[facility]



               3-10           Wheat                0.5 mL,           l



               21:50:                               Injection,           [place]



               00                                 IV, Once,           



                                                  first dose           



                                                  03/10/16           



                                                  15:50:00           



                                                  CST, stop           



                                                  date           



                                                  03/10/16           



                                                  15:50:00           



                                                  CST            

 

     midazolam            No   Deuce                0.5 mg =           [facility]

[facility]



               3-10           Wheat                0.5 mL,           l



               21:50:                               Injection,           [place]



               00                                 IV, Once,           



                                                  first dose           



                                                  03/10/16           



                                                  15:50:00           



                                                  CST, stop           



                                                  date           



                                                  03/10/16           



                                                  15:50:00           



                                                  CST            

 

     fentaNYL            No   [place]                25 mcg =           [facility]

[facility]



               3-10           Wheat                0.5 mL,           l



               21:50:                               Injection,           [place]



               00                                 IV, Once,           



                                                  first dose           



                                                  03/10/16           



                                                  15:50:00           



                                                  CST, stop           



                                                  date           



                                                  03/10/16           



                                                  15:50:00           



                                                  CST            

 

     midazolam      -      No   Deuce                0.5 mg =           [facility]

[facility]



               3-10           Wheat                0.5 mL,           l



               21:50:                               Injection,           [place]



               00                                 IV, Once,           



                                                  first dose           



                                                  03/10/16           



                                                  15:50:00           



                                                  CST, stop           



                                                  date           



                                                  03/10/16           



                                                  15:50:00           



                                                  CST            

 

     fentaNYL            No   Deuce                25 mcg =           [facility]

[facility]



               3-10           Wheat                0.5 mL,           l



               21:50:                               Injection,           [place]



               00                                 IV, Once,           



                                                  first dose           



                                                  03/10/16           



                                                  15:50:00           



                                                  CST, stop           



                                                  date           



                                                  03/10/16           



                                                  15:50:00           



                                                  CST            

 

     midazolam      -      No   Deuce                0.5 mg =           [facility]

[facility]



               3-10           [place]                0.5 mL,           l



               21:50:                               Injection,           [place]



               00                                 IV, Once,           



                                                  first dose           



                                                  03/10/16           



                                                  15:50:00           



                                                  CST, stop           



                                                  date           



                                                  03/10/16           



                                                  15:50:00           



                                                  CST            

 

     fentaNYL      -      No   Deuce                25 mcg =           [facility]

[facility]



               3-10           Wheat                0.5 mL,           l



               21:50:                               Injection,           [place]



               00                                 IV, Once,           



                                                  first dose           



                                                  03/10/16           



                                                  15:50:00           



                                                  CST, stop           



                                                  date           



                                                  03/10/16           



                                                  15:50:00           



                                                  CST            

 

     midazolam            No   Deuce                0.5 mg =           [facility]

[facility]



               3-10           Wheat                0.5 mL,           l



               21:10:                               Injection,           [place]



               00                                 IV, Once,           



                                                  first dose           



                                                  03/10/16           



                                                  15:10:00           



                                                  CST, stop           



                                                  date           



                                                  03/10/16           



                                                  15:10:00           



                                                  CST            

 

     fentaNYL            No   Deuce                25 mcg =           [facility]

[facility]



               3-10           Wheat                0.5 mL,           l



               21:10:                               Injection,           [place]



               00                                 IV, Once,           



                                                  first dose           



                                                  03/10/16           



                                                  15:10:00           



                                                  CST, stop           



                                                  date           



                                                  03/10/16           



                                                  15:10:00           



                                                  CST            

 

     midazolam            No   Deuce                0.5 mg =           [facility]

[facility]



               3-10           Wheat                0.5 mL,           l



               21:10:                               Injection,           [place]



               00                                 IV, Once,           



                                                  first dose           



                                                  03/10/16           



                                                  15:10:00           



                                                  CST, stop           



                                                  date           



                                                  03/10/16           



                                                  15:10:00           



                                                  CST            

 

     fentaNYL      2016-0      No   Deuce                25 mcg =           [facility]

[facility]



               3-10           Wheat                0.5 mL,           l



               21:10:                               Injection,           [place]



               00                                 IV, Once,           



                                                  first dose           



                                                  03/10/16           



                                                  15:10:00           



                                                  CST, stop           



                                                  date           



                                                  03/10/16           



                                                  15:10:00           



                                                  CST            

 

     midazolam      2016-0      No   Deuce                0.5 mg =           Me

[facility]



               3-10           Wheat                0.5 mL,           l



               21:10:                               Injection,           [place]



               00                                 IV, Once,           



                                                  first dose           



                                                  03/10/16           



                                                  15:10:00           



                                                  CST, stop           



                                                  date           



                                                  03/10/16           



                                                  15:10:00           



                                                  CST            

 

     fentaNYL      2016-0      No   Deuce                25 mcg =           [facility]

[facility]



               3-10           Wheat                0.5 mL,           l



               21:10:                               Injection,           [place]



               00                                 IV, Once,           



                                                  first dose           



                                                  03/10/16           



                                                  15:10:00           



                                                  CST, stop           



                                                  date           



                                                  03/10/16           



                                                  15:10:00           



                                                  CST            

 

     midazolam      2016-0      No   Deuce                0.5 mg =           [facility]

[facility]



               3-10           Wheat                0.5 mL,           l



               21:10:                               Injection,           [place]



               00                                 IV, Once,           



                                                  first dose           



                                                  03/10/16           



                                                  15:10:00           



                                                  CST, stop           



                                                  date           



                                                  03/10/16           



                                                  15:10:00           



                                                  CST            

 

     fentaNYL      2016-0      No   Deuce                25 mcg =           [facility]

[facility]



               3-10           Wheat                0.5 mL,           l



               21:10:                               Injection,           [place]



               00                                 IV, Once,           



                                                  first dose           



                                                  03/10/16           



                                                  15:10:00           



                                                  CST, stop           



                                                  date           



                                                  03/10/16           



                                                  15:10:00           



                                                  CST            

 

     midazolam      2016-0      No   Deuce                0.5 mg =           Me

[facility]



               3-10           Wheat                0.5 mL,           l



               21:10:                               Injection,           [place]



               00                                 IV, Once,           



                                                  first dose           



                                                  03/10/16           



                                                  15:10:00           



                                                  CST, stop           



                                                  date           



                                                  03/10/16           



                                                  15:10:00           



                                                  CST            

 

     fentaNYL      2016-0      No   Deuce                25 mcg =           [facility]

[facility]



               3-10           Wheat                0.5 mL,           l



               21:10:                               Injection,           [place]



               00                                 IV, Once,           



                                                  first dose           



                                                  03/10/16           



                                                  15:10:00           



                                                  CST, stop           



                                                  date           



                                                  03/10/16           



                                                  15:10:00           



                                                  CST            

 

     fentaNYL      2016-0      No   Deuce                25 mcg =           [facility]

[facility]



               3-10           Wheat                0.5 mL,           l



               21:05:                               Injection,           [place]



               00                                 IV, Once,           



                                                  first dose           



                                                  03/10/16           



                                                  15:05:00           



                                                  CST, stop           



                                                  date           



                                                  03/10/16           



                                                  15:05:00           



                                                  CST            

 

     midazolam      2016-0      No   Deuce                0.5 mg =           Me

[facility]



               3-10           Wheat                0.5 mL,           l



               21:05:                               Injection,           [place]



               00                                 IV, Once,           



                                                  first dose           



                                                  03/10/16           



                                                  15:05:00           



                                                  CST, stop           



                                                  date           



                                                  03/10/16           



                                                  15:05:00           



                                                  CST            

 

     fentaNYL      2016-0      No   Deuce                25 mcg =           [facility]

[facility]



               3-10           Wheat                0.5 mL,           l



               21:05:                               Injection,           [place]



               00                                 IV, Once,           



                                                  first dose           



                                                  03/10/16           



                                                  15:05:00           



                                                  CST, stop           



                                                  date           



                                                  03/10/16           



                                                  15:05:00           



                                                  CST            

 

     midazolam      2016-0      No   Deuce                0.5 mg =           Me

[facility]



               3-10           Wheat                0.5 mL,           l



               21:05:                               Injection,           [place]



               00                                 IV, Once,           



                                                  first dose           



                                                  03/10/16           



                                                  15:05:00           



                                                  CST, stop           



                                                  date           



                                                  03/10/16           



                                                  15:05:00           



                                                  CST            

 

     fentaNYL      2016-0      No   Deuce                25 mcg =           [facility]

[facility]



               3-10           Wheat                0.5 mL,           l



               21:05:                               Injection,           [place]



               00                                 IV, Once,           



                                                  first dose           



                                                  03/10/16           



                                                  15:05:00           



                                                  CST, stop           



                                                  date           



                                                  03/10/16           



                                                  15:05:00           



                                                  CST            

 

     midazolam      -0      No   Deuce                0.5 mg =           [facility]

[facility]



               3-10           Wheat                0.5 mL,           l



               21:05:                               Injection,           [place]



               00                                 IV, Once,           



                                                  first dose           



                                                  03/10/16           



                                                  15:05:00           



                                                  CST, stop           



                                                  date           



                                                  03/10/16           



                                                  15:05:00           



                                                  CST            

 

     fentaNYL      -0      No   Deuce                25 mcg =           [facility]

[facility]



               3-10           Wheat                0.5 mL,           l



               21:05:                               Injection,           [place]



               00                                 IV, Once,           



                                                  first dose           



                                                  03/10/16           



                                                  15:05:00           



                                                  CST, stop           



                                                  date           



                                                  03/10/16           



                                                  15:05:00           



                                                  CST            

 

     midazolam      -0      No   Deuce                0.5 mg =           [facility]

[facility]



               3-10           Wheat                0.5 mL,           l



               21:05:                               Injection,           [place]



               00                                 IV, Once,           



                                                  first dose           



                                                  03/10/16           



                                                  15:05:00           



                                                  CST, stop           



                                                  date           



                                                  03/10/16           



                                                  15:05:00           



                                                  CST            

 

     fentaNYL      -0      No   Deuce                25 mcg =           [facility]

[facility]



               3-10           Wheat                0.5 mL,           l



               21:05:                               Injection,           [place]



               00                                 IV, Once,           



                                                  first dose           



                                                  03/10/16           



                                                  15:05:00           



                                                  CST, stop           



                                                  date           



                                                  03/10/16           



                                                  15:05:00           



                                                  CST            

 

     midazolam            No   Deuce                0.5 mg =           Me

[facility]



               3-10           Wheat                0.5 mL,           l



               21:05:                               Injection,           [place]



               00                                 IV, Once,           



                                                  first dose           



                                                  03/10/16           



                                                  15:05:00           



                                                  CST, stop           



                                                  date           



                                                  03/10/16           



                                                  15:05:00           



                                                  CST            

 

     clindamycin            No   Yoan                900 mg, IV        

   [facility]



               3-10           Lei                Piggyback,           l



               20:00:                               Once,           [place]



               00                                 infuse           



                                                  over 30           



                                                  minutes,           



                                                  first dose           



                                                  03/10/16           



                                                  14:00:00           



                                                  CST, stop           



                                                  date           



                                                  03/10/16           



                                                  14:00:00           



                                                  CST,           



                                                  Prophylaxi           



                                                  s              

 

     clindamycin            No   Yoan                900 mg, IV        

   [facility]



               3-10           Lei                Piggyback,           l



               20:00:                               Once,           [place]



               00                                 infuse           



                                                  over 30           



                                                  minutes,           



                                                  first dose           



                                                  03/10/16           



                                                  14:00:00           



                                                  CST, stop           



                                                  date           



                                                  03/10/16           



                                                  14:00:00           



                                                  CST,           



                                                  Prophylaxi           



                                                  s              

 

     clindamycin            No   Yoan                900 mg, IV        

   [facility]



               3-10           Lei                Piggyback,           l



               20:00:                               Once,           [place]



               00                                 infuse           



                                                  over 30           



                                                  minutes,           



                                                  first dose           



                                                  03/10/16           



                                                  14:00:00           



                                                  CST, stop           



                                                  date           



                                                  03/10/16           



                                                  14:00:00           



                                                  CST,           



                                                  Prophylaxi           



                                                  s              

 

     clindamycin            No   Yoan                900 mg, IV        

   [facility]



               3-10           Lei                Piggyback,           l



               20:00:                               Once,           [place]



               00                                 infuse           



                                                  over 30           



                                                  minutes,           



                                                  first dose           



                                                  03/10/16           



                                                  14:00:00           



                                                  CST, stop           



                                                  date           



                                                  03/10/16           



                                                  14:00:00           



                                                  CST,           



                                                  Prophylaxi           



                                                  s              

 

     clindamycin            No   Yoan                900 mg, IV        

   [facility]



               3-10           Lei                Piggyback,           l



               20:00:                               Once,           [place]



               00                                 infuse           



                                                  over 30           



                                                  minutes,           



                                                  first dose           



                                                  03/10/16           



                                                  14:00:00           



                                                  CST, stop           



                                                  date           



                                                  03/10/16           



                                                  14:00:00           



                                                  CST,           



                                                  Prophylaxi           



                                                  s              

 

     LR 1,000 mL            No   Hgassan K                1,000 mL,          

 [facility]



               3-10           Chun                IV, 30           l



               19:27:                               mL/hr,           [place]



                                                start date           



                                                  03/10/16           



                                                  13:27:00           



                                                  CST            

 

     Lidocaine            No   Ghassan K                0.2 mL,           [facility]

[facility]



     2% 0.2 mL      3-10           Chun                Injection,           l



     IV Start      19:27:                               Subcutaneo           Her

[facility]



     [[place]]      00                                 us, Once           



                                                  PRN for           



                                                  other (see           



                                                  comment),           



                                                  first dose           



                                                  03/10/16           



                                                  13:27:00           



                                                  CST            

 

     LR 1,000 mL            No   Ghassan K                1,000 mL,          

 [facility]



               3-10           Chun                IV, 30           l



               19:27:                               mL/hr,           [place]



                                                start date           



                                                  03/10/16           



                                                  13:27:00           



                                                  CST            

 

     Lidocaine            No   Ghassan K                0.2 mL,           [facility]

[facility]



     2% 0.2 mL      3-10           Chun                Injection,           l



     IV Start      19:27:                               Subcutaneo           Her

hernandes



     [[facility]]      00                                 us, Once           



                                                  PRN for           



                                                  other (see           



                                                  comment),           



                                                  first dose           



                                                  03/10/16           



                                                  13:27:00           



                                                  CST            

 

     LR 1,000 mL            No   Ghassan K                1,000 mL,          

 [facility]



               3-10           Chun                IV, 30           l



               19:27:                               mL/hr,           [place]



                                                start date           



                                                  03/10/16           



                                                  13:27:00           



                                                  CST            

 

     Lidocaine            No   Ghassan K                0.2 mL,           Mem

oria



     2% 0.2 mL      3-10           Chun                Injection,           l



     IV Start      19:27:                               Subcutaneo           Her

hernandes



     [[facility]]      00                                 us, Once           



                                                  PRN for           



                                                  other (see           



                                                  comment),           



                                                  first dose           



                                                  03/10/16           



                                                  13:27:00           



                                                  CST            

 

     LR 1,000 mL            No   Ghassan K                1,000 mL,          

 [facility]



               3-10           Chun                IV, 30           l



               19:27:                               mL/hr,           [place]



                                                start date           



                                                  03/10/16           



                                                  13:27:00           



                                                  CST            

 

     Lidocaine            No   Ghassan K                0.2 mL,           Mem

[facility]



     2% 0.2 mL      3-10           Chun                Injection,           l



     IV Start      19:27:                               Subcutaneo           Her

hernandes



     [[place]]      00                                 us, Once           



                                                  PRN for           



                                                  other (see           



                                                  comment),           



                                                  first dose           



                                                  03/10/16           



                                                  13:27:00           



                                                  CST            

 

     LR 1,000 mL            No   Ghassan K                1,000 mL,          

 [facility]



               3-10           [facility]un                IV, 30           l



               19:27:                               mL/hr,           [place]



                                                start date           



                                                  03/10/16           



                                                  13:27:00           



                                                  CST            

 

     Lidocaine            No   Ghassan K                0.2 mL,           Mem

[facility]



     2% 0.2 mL      3-10           Chun                Injection,           l



     IV Start      19:27:                               Subcutaneo           Her

hernandes



     [[facility]]      00                                 us, Once           



                                                  PRN for           



                                                  other (see           



                                                  comment),           



                                                  first dose           



                                                  03/10/16           



                                                  13:27:00           



                                                  CST            

 

     Seroquel            Yes                      100 mg,           [facility]

a



               3-09                               Oral,           l



               14:40:                               Daily, 0           [place]



               00                                 Refill(s),           



                                                  migraines           

 

     acetaminoph            Yes                      1 tabs,           [facility]

[facility]



     en-HYDROcod      3-09                               Oral,           l



     one 325      14:40:                               q6hr, 0           [place]



     mg-5 mg      00                                 Refill(s),           



     oral tablet                                         pain           

 

     traMADol 50            Yes                      50 mg = 1           M

[facility]



     mg oral      3-09                               tabs,           l



     tablet      14:40:                               Oral,           [place]



               00                                 q4hr, 0           



                                                  Refill(s),           



                                                  pain           

 

     amLODIPine-            Yes                      1 tabs,           [facility]

[facility]



     atorvastati      3-09                               Oral, qHS,           l



     n 5 mg-40      14:40:                               0              [place]



     mg oral      00                                 Refill(s),           



     tablet                                         cholestero           



                                                  l/hyperten           



                                                  alexx           

 

     Osteo            Yes                      Oral,           [facility]



     Bi-Flex      3                               Daily, 0           l



               14:40:                               Refill(s),           [place]



               00                                 supplement           

 

     Nature's            Yes                      1,000 mg =           Mem

[facility]



     Bounty Red      3-09                               2 caps,           l



     Krill Oil      14:40:                               Oral, BID,           He

[facility]



     500 mg oral      00                                 0              



     capsule                                         Refill(s),           



                                                  supplement           

 

     aspirin 81            Yes                      81 mg = 1           Me

[facility]



     mg oral      3-09                               tabs,           l



     tablet      14:40:                               Oral,           [place]



               00                                 Daily, 0           



                                                  Refill(s),           



                                                  supplement           

 

     Axert 12.5            Yes                      12.5 mg =           Me

[facility]



     mg oral      3-09                               1 tabs,           l



     tablet      14:40:                               Oral,           [place]



               00                                 Once, PRN           



                                                  for            



                                                  migraine           



                                                  headache,           



                                                  may repeat           



                                                  dose once           



                                                  in 2           



                                                  hours, # 6           



                                                  tabs, 0           



                                                  Refill(s),           



                                                  migrainesm           



                                                  ay repeat           



                                                  dose once           



                                                  in 2 hours           

 

     Wellbutrin            Yes                      300 mg,           [place]

[facility]



     XL        3-09                               Oral,           l



               14:40:                               q24hr, 0           [place]



               00                                 Refill(s),           



                                                  migraines           

 

     Seroquel            Yes                      100 mg,           [facility]

a



               3-09                               Oral,           l



               14:40:                               Daily, 0           [place]



               00                                 Refill(s),           



                                                  migraines           

 

     acetaminoph            Yes                      1 tabs,           [facility]

[facility]



     en-HYDROcod      3-09                               Oral,           l



     one 325      14:40:                               q6hr, 0           [place]



     mg-5 mg      00                                 Refill(s),           



     oral tablet                                         pain           

 

     traMADol 50            Yes                      50 mg = 1           M

[facility]



     mg oral      3-09                               tabs,           l



     tablet      14:40:                               Oral,           [place]



               00                                 q4hr, 0           



                                                  Refill(s),           



                                                  pain           

 

     amLODIPine-            Yes                      1 tabs,           Mem

[facility]



     atorvastati      3-                               Oral, qHS,           l



     n 5 mg-40      14:40:                               0              [place]



     mg oral      00                                 Refill(s),           



     tablet                                         cholestero           



                                                  l/hyperten           



                                                  alexx           

 

     Osteo            Yes                      Oral,           [facility]



     Bi-Flex      3-                               Daily, 0           l



               14:40:                               Refill(s),           [place]



               00                                 supplement           

 

     Nature's            Yes                      1,000 mg =           Mem

[facility]



     Bounty Red      3-09                               2 caps,           l



     Krill Oil      14:40:                               Oral, BID,           He

[facility]



     500 mg oral      00                                 0              



     capsule                                         Refill(s),           



                                                  supplement           

 

     aspirin 81            Yes                      81 mg = 1           Me

[facility]



     mg oral      3-09                               tabs,           l



     tablet      14:40:                               Oral,           [place]



               00                                 Daily, 0           



                                                  Refill(s),           



                                                  supplement           

 

     Axert 12.5            Yes                      12.5 mg =           Me

[facility]



     mg oral      3-09                               1 tabs,           l



     tablet      14:40:                               Oral,           [place]



               00                                 Once, PRN           



                                                  for            



                                                  migraine           



                                                  headache,           



                                                  may repeat           



                                                  dose once           



                                                  in 2           



                                                  hours, # 6           



                                                  tabs, 0           



                                                  Refill(s),           



                                                  migrainesm           



                                                  ay repeat           



                                                  dose once           



                                                  in 2 hours           

 

     Wellbutrin            Yes                      300 mg,           [place]

[facility]



     XL        3-09                               Oral,           l



               14:40:                               q24hr, 0           [place]



               00                                 Refill(s),           



                                                  migraines           

 

     Seroquel            Yes                      100 mg,           [facility]

a



               3-09                               Oral,           l



               14:40:                               Daily, 0           [place]



               00                                 Refill(s),           



                                                  migraines           

 

     acetaminoph            Yes                      1 tabs,           [facility]

[facility]



     en-HYDROcod      3-                               Oral,           l



     one 325      14:40:                               q6hr, 0           [place]



     mg-5 mg      00                                 Refill(s),           



     oral tablet                                         pain           

 

     traMADol 50            Yes                      50 mg = 1           M

[facility]



     mg oral      3-09                               tabs,           l



     tablet      14:40:                               Oral,           [place]



               00                                 q4hr, 0           



                                                  Refill(s),           



                                                  pain           

 

     amLODIPine-            Yes                      1 tabs,           Mem

[facility]



     atorvastati      3-                               Oral, qHS,           l



     n 5 mg-40      14:40:                               0              [place]



     mg oral      00                                 Refill(s),           



     tablet                                         cholestero           



                                                  l/hyperten           



                                                  alexx           

 

     Osteo            Yes                      Oral,           [facility]



     Bi-Flex      3-09                               Daily, 0           l



               14:40:                               Refill(s),           [place]



               00                                 supplement           

 

     Nature's            Yes                      1,000 mg =           [facility]

[facility]



     Bounty Red      3-09                               2 caps,           l



     Krill Oil      14:40:                               Oral, BID,           [facility]

[facility]



     500 mg oral      00                                 0              



     capsule                                         Refill(s),           



                                                  supplement           

 

     aspirin 81            Yes                      81 mg = 1           Me

[facility]



     mg oral      3-09                               tabs,           l



     tablet      14:40:                               Oral,           [place]



               00                                 Daily, 0           



                                                  Refill(s),           



                                                  supplement           

 

     Axert 12.5            Yes                      12.5 mg =           Me

[facility]



     mg oral      3-09                               1 tabs,           l



     tablet      14:40:                               Oral,           [place]



               00                                 Once, PRN           



                                                  for            



                                                  migraine           



                                                  headache,           



                                                  may repeat           



                                                  dose once           



                                                  in 2           



                                                  hours, # 6           



                                                  tabs, 0           



                                                  Refill(s),           



                                                  migrainesm           



                                                  ay repeat           



                                                  dose once           



                                                  in 2 hours           

 

     Wellbutrin            Yes                      300 mg,           [place]

[facility]



     XL        3-09                               Oral,           l



               14:40:                               q24hr, 0           [place]



               00                                 Refill(s),           



                                                  migraines           

 

     Seroquel            Yes                      100 mg,           [facility]

a



               3-09                               Oral,           l



               14:40:                               Daily, 0           [place]



               00                                 Refill(s),           



                                                  migraines           

 

     acetaminoph            Yes                      1 tabs,           [facility]

[facility]



     en-HYDROcod      3-                               Oral,           l



     one 325      14:40:                               q6hr, 0           [place]



     mg-5 mg      00                                 Refill(s),           



     oral tablet                                         pain           

 

     traMADol 50            Yes                      50 mg = 1           M

[facility]



     mg oral      3-09                               tabs,           l



     tablet      14:40:                               Oral,           [place]



               00                                 q4hr, 0           



                                                  Refill(s),           



                                                  pain           

 

     amLODIPine-            Yes                      1 tabs,           Mem

[facility]



     atorvastati      3-09                               Oral, qHS,           l



     n 5 mg-40      14:40:                               0              [place]



     mg oral      00                                 Refill(s),           



     tablet                                         cholestero           



                                                  l/hyperten           



                                                  alexx           

 

     Osteo      -      Yes                      Oral,           [facility]



     Bi-Flex      3-09                               Daily, 0           l



               14:40:                               Refill(s),           [place]



               00                                 supplement           

 

     Nature's            Yes                      1,000 mg =           Mem

[facility]



     Bounty Red      3-09                               2 caps,           l



     Krill Oil      14:40:                               Oral, BID,           [facility]

[facility]



     500 mg oral      00                                 0              



     capsule                                         Refill(s),           



                                                  supplement           

 

     aspirin 81            Yes                      81 mg = 1           Me

[facility]



     mg oral      3-09                               tabs,           l



     tablet      14:40:                               Oral,           [place]



               00                                 Daily, 0           



                                                  Refill(s),           



                                                  supplement           

 

     Axert 12.5            Yes                      12.5 mg =           Me

[facility]



     mg oral      3-09                               1 tabs,           l



     tablet      14:40:                               Oral,           [place]



               00                                 Once, PRN           



                                                  for            



                                                  migraine           



                                                  headache,           



                                                  may repeat           



                                                  dose once           



                                                  in 2           



                                                  hours, # 6           



                                                  tabs, 0           



                                                  Refill(s),           



                                                  migrainesm           



                                                  ay repeat           



                                                  dose once           



                                                  in 2 hours           

 

     Wellbutrin            Yes                      300 mg,           [place]

[facility]



     XL        3-                               Oral,           l



               14:40:                               q24hr, 0           [place]



               00                                 Refill(s),           



                                                  migraines           

 

     Seroquel            Yes                      100 mg,           [facility]

a



               3-                               Oral,           l



               14:40:                               Daily, 0           [place]



               00                                 Refill(s),           



                                                  migraines           

 

     acetaminoph            Yes                      1 tabs,           [facility]

[facility]



     en-HYDROcod      3-09                               Oral,           l



     one 325      14:40:                               q6hr, 0           [place]



     mg-5 mg      00                                 Refill(s),           



     oral tablet                                         pain           

 

     traMADol 50            Yes                      50 mg = 1           [facility]

[facility]



     mg oral      3-09                               tabs,           l



     tablet      14:40:                               Oral,           [place]



               00                                 q4hr, 0           



                                                  Refill(s),           



                                                  pain           

 

     amLODIPine-            Yes                      1 tabs,           [facility]

[facility]



     atorvastati      3-09                               Oral, qHS,           l



     n 5 mg-40      14:40:                               0              [place]



     mg oral      00                                 Refill(s),           



     tablet                                         cholestero           



                                                  l/hyperten           



                                                  alexx           

 

     Osteo            Yes                      Oral,           [facility]



     Bi-Flex      3-09                               Daily, 0           l



               14:40:                               Refill(s),           [place]



               00                                 supplement           

 

     Nature's            Yes                      1,000 mg =           [facility]

[facility]



     Bounty Red      3-09                               2 caps,           l



     Krill Oil      14:40:                               Oral, BID,           [facility]

[facility]



     500 mg oral      00                                 0              



     capsule                                         Refill(s),           



                                                  supplement           

 

     aspirin 81            Yes                      81 mg = 1           [facility]

[facility]



     mg oral      3-09                               tabs,           l



     tablet      14:40:                               Oral,           [place]



               00                                 Daily, 0           



                                                  Refill(s),           



                                                  supplement           

 

     Axert 12.5            Yes                      12.5 mg =           [facility]

[facility]



     mg oral      3-09                               1 tabs,           l



     tablet      14:40:                               Oral,           [place]



               00                                 Once, PRN           



                                                  for            



                                                  migraine           



                                                  headache,           



                                                  may repeat           



                                                  dose once           



                                                  in 2           



                                                  hours, # 6           



                                                  tabs, 0           



                                                  Refill(s),           



                                                  migrainesm           



                                                  ay repeat           



                                                  dose once           



                                                  in 2 hours           

 

     Wellbutrin            Yes                      300 mg,           [facility]

[facility]



     XL        3-09                               Oral,           l



               14:40:                               q24hr, 0           [place]



               00                                 Refill(s),           



                                                  migraines           







Immunizations







           Ordered Immunization Filled Immunization Date       Status     Commen

ts   Source



           Name       Name                                        

 

           FLUCELVAX QUAD             2022 Completed             [facility]

[facility]



                                 00:00:00                         [facility]

 

           Bebtelovima            2022 Completed             [facility]



                                 00:00:00                         [facility]

 

           FLUCELVAX QUAD             2021-10-05 Completed             [facility]

[facility]



                                 00:00:00                         [facility]

 

           FLUCELVAX QUAD             2020 Completed             [facility]

[facility]



                                 00:00:00                         [facility]

 

           Hx influenza            2019-10-23 Completed             [facility]

[facility]



           vaccine-unspecified<            00:00:00                         



           sup>1</sup>                                             

 

           Hx influenza            2019-10-23 Completed             [facility]

[facility]



           vaccine-unspecified<            00:00:00                         



           sup>1</sup>                                             

 

           Hx influenza            2019-10-23 Completed             [facility]

[facility]



           vaccine-unspecified<            00:00:00                         



           sup>1</sup>                                             

 

           Hx influenza            2019-10-23 Completed             [facility]

[facility]



           vaccine-unspecified<            00:00:00                         



           sup>1</sup>                                             

 

           FLUCELVAX QUAD             2019-10-23 Completed             [facility]

[facility]



                                 [facility]                         [facility]

 

           Hx influenza            2019-10-23 Completed             [facility]

[facility]



           vaccine-unspecified<            00:00:00                         



           sup>1</sup>                                             

 

           Tdap                  2019 Completed             [facility]



                                 [facility]:00                         [facility]

 

           pneumococcal            2019 Completed             [facility]

[facility]



           23-valent             00:00:00                         



           vaccine<sup>3</sup>                                             

 

           pneumococcal            2019 Completed             [facility]

[facility]



           23-valent             00:00:00                         



           vaccine<sup>3</sup>                                             

 

           pneumococcal            2019 Completed             [facility]

[facility]



           23-valent             00:00:00                         



           vaccine<sup>3</sup>                                             

 

           pneumococcal            2019 Completed             [facility]

[facility]



           23-valent             00:00:00                         



           vaccine<sup>3</sup>                                             

 

           pneumococcal            2019 Completed             [facility]

[facility]



           23-valent             00:00:00                         



           vaccine<sup>3</sup>                                             

 

           Hx influenza            2011-10-25 Completed             [facility]

[facility]



           vaccine-unspecified<            14:42:42                         



           sup>1</sup>                                             

 

           Hx influenza            2011-10-25 Completed             [facility]

[facility]



           vaccine-unspecified<            14:42:42                         



           sup>2</sup>                                             

 

           Hx influenza            2011-10-25 Completed             [facility]

[facility]



           vaccine-unspecified<            14:42:42                         



           sup>1</sup>                                             

 

           Hx influenza            2011-10-25 Completed             [facility]

[facility]



           vaccine-unspecified<            14:42:42                         



           sup>2</sup>                                             

 

           Hx influenza            2011-10-25 Completed             [facility]

[facility]



           vaccine-unspecified<            14:42:42                         



           sup>1</sup>                                             

 

           Hx influenza            2011-10-25 Completed             [facility]

[facility]



           vaccine-unspecified<            14:42:42                         



           sup>2</sup>                                             

 

           Hx influenza            2011-10-25 Completed             [facility]

[facility]



           vaccine-unspecified<            14:42:42                         



           sup>1</sup>                                             

 

           Hx influenza            2011-10-25 Completed             [facility]

[facility]



           vaccine-unspecified<            14:42:42                         



           sup>2</sup>                                             

 

           Hx influenza            2011-10-25 Completed             [facility]

[facility]



           vaccine-unspecified<            14:42:42                         



           sup>1</sup>                                             

 

           Hx influenza            2011-10-25 Completed             [facility]

[facility]



           vaccine-unspecified<            14:42:42                         



           sup>2</sup>                                             







Vital Signs







             Vital Name   Observation Time Observation Value Comments     Source

 

             WEIGHT       2022 11:28:00 98.2 kg                   

 

             WEIGHT       2022 07:30:00 101 kg                    

 

             HEIGHT       2022 16:00:00 170.2 cm                  

 

             WEIGHT       2022 16:00:00 98.9 kg                   

 

             WEIGHT       2022 11:28:00 98.2 kg                   

 

             WEIGHT       2022 07:30:00 101 kg                    

 

             HEIGHT       2022 16:00:00 170.2 cm                  

 

             WEIGHT       2022 16:00:00 98.9 kg                   

 

             WEIGHT       2022 11:28:00 98.2 kg                   

 

             WEIGHT       2022 07:30:00 101 kg                    

 

             HEIGHT       2022 16:00:00 170.2 cm                  

 

             WEIGHT       2022 16:00:00 98.9 kg                   

 

             Systolic blood 2022 08:05:00 133 mm[Hg]                [facility]

 

             Diastolic blood 2022 08:05:00 87 mm[Hg]                 [facility]

 

             Heart rate   2022 08:05:00 104 /min                  [facility]

 

             Body temperature 2022 08:05:00 35.56 Sherlyn                 [facility]

 

             Respiratory rate 2022 08:05:00 20 /min                   [facility]

 

             Oxygen saturation in 2022 08:05:00 98 /min                   

[facility]



             Arterial blood by                                        [facility]

nter



             Pulse oximetry                                        

 

             Body weight  2022 11:28:00 98.2 kg                   [facility]

 

             BMI          2022 11:28:00 33.91 kg/m2               [facility]

 

             Body height  2022 16:00:00 170.2 cm                  [facility]

 

             Systolic blood 2022 21:24:34 114 mm[Hg]                [facility]

ist [facility]



             pressure                                            

 

             Diastolic blood 2022 21:24:34 57 mm[facility]Hg]                 [facility]

[facility]



             pressure                                            

 

             Heart rate   2022 21:24:34 87 /min                   [facility]

 

             Body temperature 2022 21:24:34 36.61 Shelryn                 [facility]

 

             Respiratory rate 2022 21:24:34 18 /min                   [facility]

 

             Oxygen saturation in 2022 21:24:34 100 /min                  

[facility]



             Arterial blood by                                        



             Pulse oximetry                                        

 

             Body height  2022 05:00:00 170.2 cm                  [facility]

 

             Body weight  2022 05:00:00 107.2 kg                  [facility]

 

             BMI          2022 05:00:00 37.02 kg/m2               [facility]

 

             Temperature Oral (F) 2022 19:52:00 97.6 F                    

[facility] [place]

 

             Height       2022 19:52:00 170.18 cm                 [facility]

 [place]

 

             Weight       2022 19:52:00                           [facility]

 [place]

 

             BMI Calculated 2022 19:52:00                           [facility]

[facility] [place]

 

             Heart Rate   2021 21:23:00                           [facility]

 [place]

 

             Systolic (mm Hg) 2021 21:23:00                           [facility]

[facility] [place]

 

             Diastolic (mm Hg) 2021 21:23:00                           [facility]

[facility] [place]

 

             Height       2021 21:23:00 165.1 cm                  [facility]

 [place]

 

             Weight       2021 21:23:00                           [facility]

 [place]

 

             BMI Calculated 2021 21:23:00                           [facility]

[facility] [place]

 

             Heart Rate   2021 20:12:00                           [facility]

 [place]

 

             Heart Rate   2021 20:08:00                           [facility]

 [place]

 

             Systolic (mm Hg) 2021 20:08:00                           [facility]

[facility] [place]

 

             Diastolic (mm Hg) 2021 20:08:00                           [facility]

[facility] [place]

 

             Height       2021 20:08:00 165.1 cm                  [facility]

 [place]

 

             Weight       2021 20:08:00                           [facility]

 [place]

 

             BMI Calculated 2021 20:08:00                           [facility]

al [place]

 

             Systolic (mm Hg) 2020 15:59:00                           [facility]

[facility] [place]

 

             Diastolic (mm Hg) 2020 15:59:00                           [facility]

[facility] [place]

 

             Heart Rate   2020 15:59:00                           [facility]

 [place]

 

             Height       2020 15:59:00 165.1 cm                  [facility]

 [place]

 

             Weight       2020 15:59:00                           [facility]

 [place]

 

             BMI Calculated 2020 15:59:00                           [facility]

[facility] [place]

 

             Systolic (mm Hg) 2020 20:42:00                           [facility]

[facility] [place]

 

             Diastolic (mm Hg) 2020 20:42:00                           [facility]

[facility] [place]

 

             Heart Rate   2020 20:42:00                           [facility]

 [place]

 

             Temperature Oral (F) 2020 20:42:00 98.2 F                    

[facility] [place]

 

             Height       2020 20:42:00 170.18 cm                 [facility]

 [place]

 

             Weight       2020 20:42:00                           [facility]

 [place]

 

             BMI Calculated 2020 20:42:00                           [facility]

[facility] [place]

 

             Systolic (mm Hg) 2019 21:53:00                           [facility]

[facility] [place]

 

             Diastolic (mm Hg) 2019 21:53:00                           [facility]

[facility] [place]

 

             Heart Rate   2019 21:53:00                           [facility]

 [place]

 

             Temperature Oral (F) 2019 21:53:00 97.9 F                    

[facility] [place]

 

             Height       2019 21:53:00 165.1 cm                  [facility]

 [place]

 

             Weight       2019 21:53:00                           [facility]

 [place]

 

             BMI Calculated 2019 21:53:00                           [facility]

[facility] [place]

 

             Height       2019-10-25 14:54:00 165.1 cm                  [facility]

 [place]

 

             Weight       2019-10-25 14:54:00                           [facility]

 [place]

 

             BMI Calculated 2019-10-25 14:54:00                           [facility]

al [place]

 

             Systolic (mm Hg) 2019-10-25 14:54:00                           [facility]

[facility] [place]

 

             Diastolic (mm Hg) 2019-10-25 14:54:00                           [facility]

[facility] [place]

 

             Heart Rate   2019-10-25 14:54:00                           [facility]

 [place]

 

             Temperature Oral (F) 2019-10-25 14:54:00 97.6 F                    

[facility] [place]

 

             Systolic (mm Hg) 2019-10-10 15:37:00                           [facility]

[facility] [place]

 

             Diastolic (mm Hg) 2019-10-10 15:37:00                           [facility]

[facility] [place]

 

             Heart Rate   2019-10-10 15:37:00                           [facility]

 [place]

 

             Temperature Oral (F) 2019-10-10 15:37:00 98.2 F                    

[facility] [place]

 

             Height       2019-10-10 15:37:00 165.1 cm                  [facility]

 [place]

 

             Weight       2019-10-10 15:37:00                           [facility]

 [place]

 

             BMI Calculated 2019-10-10 15:37:00                           [facility]

al [place]

 

             Weight       2019 15:18:00                           [facility]

 [place]

 

             BMI Calculated 2019 15:18:00                           [facility]

al [place]

 

             Height       2019 15:18:00 165.1 cm                  [facility]

 [place]

 

             Temperature Oral (F) 2019 15:18:00 98.4 F                    

[facility] [place]

 

             Heart Rate   2019 15:18:00                           [facility]

 [place]

 

             Systolic (mm Hg) 2019 15:18:00                           [facility]

[facility] [place]

 

             Diastolic (mm Hg) 2019 15:18:00                           [facility]

[facility] [place]

 

             Height       2019 19:16:00 165.1 cm                  [facility]

 [place]

 

             BMI Calculated 2019 19:16:00                           [facility]

al [place]

 

             Weight       2019 19:16:00                           [facility]

 [place]

 

             Heart Rate   2019 19:16:00                           [facility]

 [place]

 

             Systolic (mm Hg) 2019 19:16:00                           [facility]

[facility] [place]

 

             Diastolic (mm Hg) 2019 19:16:00                           [facility]

[facility] [place]

 

             Height       2019 14:26:00 167.01 cm                 [facility]

 [place]

 

             BMI Calculated 2019 14:26:00                           [facility]

al [place]

 

             Weight       2019 14:26:00                           [facility]

 [place]

 

             Heart Rate   2019 14:26:00                           [facility]

 [place]

 

             Temperature Oral (F) 2019 14:26:00 97.9 F                    

[facility] [place]

 

             Systolic (mm Hg) 2019 14:26:00                           [facility]

[facility] [place]

 

             Diastolic (mm Hg) 2019 14:26:00                           [facility]

[facility] [place]

 

             Systolic (mm Hg) 2018 18:33:00                           [facility]

[facility] [place]

 

             Diastolic (mm Hg) 2018 18:33:00                           [facility]

[facility] [place]

 

             Heart Rate   2018 18:33:00                           [facility]

 [place]

 

             Weight       2018 18:33:00                           [facility]

 [place]

 

             BMI Calculated 2018 18:31:00                           [facility]

al [place]

 

             Weight       2018 18:31:00                           [facility]

 [place]

 

             Systolic (mm Hg) 2018 18:31:00                           [facility]

[facility] [place]

 

             Diastolic (mm Hg) 2018 18:31:00                           [facility]

[facility] [place]

 

             Height       2018 18:31:00 170.18 cm                 [facility]

 [place]

 

             Temperature Oral (F) 2018 18:31:00 98.3 F                    

[facility] [place]

 

             Heart Rate   2018 18:31:00                           [facility]

 [place]

 

             Weight       2018 16:14:00                           [facility]

 [place]

 

             Height       2018 16:14:00 170.18 cm                 [facility]

 [place]

 

             BMI Calculated 2018 16:14:00                           [facility]

[facility] [place]

 

             Heart Rate   2018 16:14:00                           [facility]

 [place]

 

             Systolic (mm Hg) 2018 16:14:00                           [facility]

[facility] [place]

 

             Diastolic (mm Hg) 2018 16:14:00                           [facility]

[facility] [place]

 

             BMI Calculated 2018 15:06:00                           [facility]

al [place]

 

             Height       2018 15:06:00 170.18 cm                 [facility]

 [place]

 

             Weight       2018 15:06:00                           [facility]

 [place]

 

             Systolic (mm Hg) 2018 15:06:00                           [facility]

[facility] [place]

 

             Diastolic (mm Hg) 2018 15:06:00                           [facility]

[facility] [place]

 

             Heart Rate   2018 15:06:00                           [facility]

 [place]

 

             Temperature Oral (F) 2018 15:06:00 97.9 F                    

[facility] [place]

 

             Weight       2017 16:17:00                           [facility]

 [place]

 

             Height       2017 16:17:00 170.18 cm                 [facility]

 [place]

 

             BMI Calculated 2017 16:17:00                           [facility]

al [place]

 

             Respitory Rate 2016 01:25:00                           [facility]

al [place]

 

             Systolic (mm Hg) 2016 00:50:00                           [facility]

[facility] [place]

 

             Respitory Rate 2016 00:50:00                           [facility]

al [place]

 

             Heart Rate   2016 00:50:00                           [facility]

 [place]

 

             Systolic (mm Hg) 2016 00:40:00                           [facility]

[facility] [place]

 

             Respitory Rate 2016 00:40:00                           [facility]

[facility] [place]

 

             Heart Rate   2016 00:40:00                           [facility]

 [place]

 

             Heart Rate   2016 00:30:00                           [facility]

 [place]

 

             Systolic (mm Hg) 2016 00:30:00                           [facility]

[facility] [place]

 

             Temperature Oral (F) 2016-03-10 23:50:00 37.1 Sherlyn                  

[facility] [place]

 

             Height       2016-03-10 19:23:00 169 cm                    [facility]

 [place]

 

             Weight       2016-03-10 19:23:00                           [facility]

 

             Temperature Oral (F) 2016-03-10 19:23:00 36.6 Sherlyn                  

[facility] [place]

 

             Height       2016 14:13:00 170 cm                    [facility]

 [place]

 

             Weight       2016 14:13:00                           [facility]







Procedures







                Procedure       Date / Time     Performing Clinician Source



                                Performed                       

 

                ULTRAFILTRATION HD CRRT 2022 18:40:00 Jeanie Canada

 [facility]

 

                ECG 12-LEAD     2022 10:57:31 Unknown, Hl7 [facility]

 

                NM MYOCARDIAL PERFUSION 2022 10:55:00 Luma Beckham  [facility]



                PET/CT (REST & STRESS)                                 Medical C

enter

 

                TREADMILL       2022 10:49:30 Unknown, 7 [facility]



                TOLERANCE(NON-NUCLEAR                                 Medical Ce

nter



                TREADMILL)                                      

 

                ECG 12-LEAD     2022 10:49:10 Unknown, Hl7 [facility]

 

                CBC W/PLT COUNT & AUTO 2022 03:40:00 Vinayak Broussard  [facility]

 

                BASIC METABOLIC PANEL 2022 03:40:00 Vinayak Broussard  [facility]

 

                MAGNESIUM       2022 03:40:00 Vinayak Broussard  [facility]

 

                PHOSPHORUS      2022 03:40:00 Vinayak Broussard  [facility]

 

                CBC W/PLT COUNT & AUTO 2022 03:40:00 David Godwin [facility]

 

                HEPATITIS B SURFACE 2022 08:07:00 Capo Squires  [facility]

 

                HEMODIALYSIS INPATIENT 2022 07:15:26 Bo Corado [facility]

 

                CBC W/PLT COUNT & AUTO 2022 04:40:00 Vinayak Broussard  [facility]

 

                BASIC METABOLIC PANEL 2022 04:40:00 Jerzy, [facility]

 

                MAGNESIUM       2022 04:40:00 Jerzy [facility]

 

                PHOSPHORUS      2022 04:40:00 Jerzy [facility]

 

                HEPATITIS C ANTIBODY 2022 04:40:00 Sanket [facility]

 

                PERIPHERAL BLOOD SMEAR - 2022 04:40:00 Sanket [facility]



                PATHOLOGIST REVIEW                                 Medical Cente

r

 

                CBC W/PLT COUNT & AUTO 2022 04:40:00 David Godwin [facility]

 

                2D ECHO W/ DOPPLER 2022 15:49:57 David Godwin [facility]



                (CW/PW/COLOR)                                   [facility]

 

                CBC W/PLT COUNT & AUTO 2022 04:30:00 Jerzy [facility]

 

                BASIC METABOLIC PANEL 2022 04:30:00 Jerzy [facility]

 

                MAGNESIUM       2022 04:30:00 Jerzy [facility]

 

                PHOSPHORUS      2022 04:30:00 Jerzy [facility]

 

                PROTEIN ELECTROPHORESIS, 2022 04:30:00 Luma Beckham  [facility]

 

                CBC W/PLT COUNT & AUTO 2022 04:30:00 David Godwin [facility]

 

                HIGH SENSITIVITY TROPONIN 2022 18:09:00 David Godwin [facility]

 

                POTASSIUM       2022 18:09:00 Jerzy [facility]

 

                XR CHEST 1 VIEW PORTABLE / 2022 15:31:00 David Godwin [facility]

 

                COMPREHENSIVE METABOLIC 2022 15:15:00 CellDavid evans [facility]

 

                MAGNESIUM       2022 15:15:00 David Godwin [facility]

 

                PHOSPHORUS      2022 15:15:00 Celli [facility]

 

                HIGH SENSITIVITY TROPONIN 2022 15:15:00 CelliDavid [facility]



                I                                               [facility]

 

                B-TYPE NATRIURETIC FACTOR 2022 15:15:00 CelliDavid Ivelisse

[facility]



                (BNP)                                           [facility]

 

                CBC W/PLT COUNT & AUTO 2022 15:15:00 [facility]



                DIFFERENTIAL                                    [facility]

 

                CBC W/PLT COUNT & AUTO 2022 15:15:00 CelliDavid [facility]



                DIFFERENTIAL                                    [facility]

 

                TSH             2022 15:14:00 Celli [facility]

 

                ECG 12-LEAD     2022 14:50:19 Unknown, Hl7 [facility]

 

                ECG 12-LEAD     2022 14:50:19 Unknown, H[facility]7 [facility]

 

                PROTHROMBIN TIME WITH INR 2022 06:57:00 Alisia Reyes   [facility]

 

                COMPREHENSIVE METABOLIC 2022 06:57:00 Alisia Reyes   [facility]



                PANEL                                           

 

                MAGNESIUM LEVEL 2022 06:57:00 Alisia Reyes Ho

spital

 

                PHOSPHORUS LEVEL 2022 06:57:00 Alisia Reyes H

ospital

 

                ESTIMATED GFR   2022 06:57:00 Alisia Reyes Ho

spital

 

                TROPONIN T      2022 06:57:00 Alisia Reyes Ho

spital

 

                ZZCOVID-19 ANTI-SPIKE IGG 2022 06:56:00 Alisia Reyes   [facility]



                ANTIBODY TITER                                  

 

                COVID-19 SEROLOGY PATIENT 2022 06:56:00 Alisia Reyes   [facility]



                SURVEILLANCE                                    

 

                HC COMPLETE BLD COUNT 2022 06:56:00 Alisia Reyes

t Hospital



                W/AUTO DIFF                                     

 

                COVID-19 QUALITATIVE 2022 04:58:00 [facility]



                RT-PCR                                          

 

                TROPONIN T      2022 03:58:00 Alisia Reyes

spital

 

                XR CHEST 1 VW PORTABLE 2022 01:31:25 [facility]

 

                COMPREHENSIVE METABOLIC 2022 01:20:00 [facility]



                PANEL                                           

 

                LIPASE LEVEL    2022 01:20:00 [facility]

 

                TROPONIN T      2022 01:20:00 Alisia Reyes 

spital

 

                B NATRIURETIC PEPTIDE 2022 01:20:00 [facility]

 

                HC COMPLETE BLD COUNT 2022 01:20:00 [facility]



                W/AUTO DIFF                                     

 

                ESTIMATED GFR   2022 01:20:00 [facility]

 

                ECG 12-LEAD     2022 01:03:45 Alisia Reyes 

spital

 

                COMPREHENSIVE METABOLIC 2022 16:47:00 [facility]



                PANEL                           Baljinder       

 

                CBC WITH PLATELET AND 2022 16:47:00 [facility]



                DIFFERENTIAL                    Baljinder       

 

                THYROID STIMULATING 2022 16:47:00 [facility]



                HORMONE                         Baljinder       

 

                PARATHYROID HORMONE 2022 16:47:00 [facility]



                                                Baljinder       

 

                VITAMIN D 25 HYDROXY LEVEL 2022 16:47:00 [facility]



                                                Baljinder       

 

                NM BONE SCAN 3 PHASE 2022-10-05 18:11:00 [facility]



                                                Baljnider       

 

                BONE DENSITY    2022-10-05 14:02:48 [facility]

spital



                                                Baljinder       

 

                BONE DENSITY PERIPHERAL 2022-10-05 14:02:48 [facility]



                                                Baljinder       

 

                POC GLUCOSE     2022 04:49:00 Martina Douglass 

spital

 

                CBC HEMOGRAM    2022 10:05:00 Sanket Keene 

spital

 

                BASIC METABOLIC PANEL 2022 10:05:00 Jassi, Sanket YING  [facility]

 

                ESTIMATED GFR   2022 10:05:00 Adriel Sparks Ho

spital



                                                Baljinder       

 

                XR LUMBAR SPINE 2 OR 3 VW 2022 21:10:24 Jassi, Sanket DOWLING.  [facility]

 

                XR THORACIC SPINE 2 VW 2022 21:10:08 Jassi[facility]

[facility]

 

                XR CHEST 1 VW PORTABLE 2022 19:05:29 Radhain il [facility]

 

                BASIC METABOLIC PANEL 2022 09:08:00 [facility]

 

                PHOSPHORUS LEVEL 2022 09:08:00 [facility]

 

                ESTIMATED GFR   2022 09:08:00 [facility]

 

                TTE COMPLETE, WO CONTRAST, 2022 15:40:58 [facility]



                W DOPPLER ([id number])                                 

 

                MRI THORACIC SPINE WO 2022 02:11:31 Adriel Sparks

[facility]



                CONTRAST                        Baljinder       

 

                MRI LUMBAR SPINE WO 2022 01:29:36 Quinton Centeno [facility]



                CONTRAST                        [facility]   

 

                HEPATITIS B CORE ANTIBODY 2022 17:34:00 Francine Fishman

[facility]



                TOTAL                                           

 

                HEPATITIS B SURFACE AB, 2022 17:34:00 FishmanFrancine

[facility]



                QUANTITATIVE                                    

 

                HEPATITIS B SURFACE 2022 17:34:00 [facility] Francinevalente Wallace [facility]



                ANTIGEN                                         

 

                COVID-19 QUALITATIVE 2022 16:35:00 Quinton Centeno [facility]



                RT-PCR                          [facility]   

 

                COVID-19 OMICRON VARIANT 2022 16:35:00 [facility]

 [facility]



                QUALITATIVE RT-PCR                 [facility]   

 

                POC GLUCOSE     2022 16:35:00 Martina Douglass Ho

spital

 

                POC GLUCOSE     2022 15:09:00 [facility]   

 

                CT LUMBAR SPINE WO 2022 13:41:51 Centeno [facility]



                CONTRAST                        [facility]   

 

                CT RENAL STONE PROTOCOL 2022 13:41:38 [facility]   

 

                HC COMPLETE BLD COUNT 2022 12:44:00 CentenoQuinton [facility]



                W/AUTO DIFF                     [facility] 2022 12:44:00 [facility]



                PANEL                           [facility]   

 

                ESTIMATED GFR   2022 12:44:00 [facility]   

 

                OK CRITICAL CARE, E/M 2022 12:28:33 CentenoQuinton [facility]



                30-74 MINUTES                   [facility]   

 

                HEPATITIS B SURFACE 2022 15:33:00                 [facility]



                ANTIGEN                                         [facility]

 

                HEPATITIS B SURFACE 2022 15:33:00                 [facility]

[facility]



                ANTIBODY                                        [facility]

 

                XR CHEST 1 VW PORTABLE 2022 13:25:18 Pamela Blanton    [facility] 2022 10:32:00 Luc [facility]



                PANEL                                           

 

                HC COMPLETE BLD COUNT 2022 10:32:00 Shelby Calle [facility]



                W/AUTO DIFF                                     

 

                ESTIMATED GFR   2022 10:32:00 Shelby Calle [facility]

 

                HEMODIALYSIS    2022 05:06:40 Janeen Rosenthal 

ospital



                                                Gabriela        

 

                HEPATITIS B CORE ANTIBODY 2022 20:59:00 Galo [facility]



                TOTAL                           Gabriela        

 

                HEPATITIS B CORE ANTIBODY 2022 20:59:00 Galo [facility]



                IGM                             Gabriela        

 

                HEPATITIS B SURFACE 2022 20:59:00 Galo [facility]



                ANTIGEN                         Gabriela        

 

                HEPATITIS B SURFACE AB, 2022 20:58:00 Met Galo

[facility]



                QUANTITATIVE                    Gabriela        

 

                HEMODIALYSIS    2022 15:58:20 [facility]

ospital



                                                [facility]        

 

                TROPONIN T      2022 [facility]:44:00 [facility]

 

                XR CHEST 1 VW PORTABLE 2022 11:41:07 [facility]

 

                TROPONIN T      2022 11:41:00 [facility]

 

                HC COMPLETE BLD COUNT 2022 11:41:00 [facility]



                W/AUTO DIFF                                     

 

                COMPREHENSIVE METABOLIC 2022 11:41:00 [facility]



                PANEL                                           

 

                PROTHROMBIN TIME WITH INR 2022 11:41:00 [facility]

[facility]

 

                PARTIAL THROMBOPLASTIN 2022 11:41:00 [facility]



                TIME (PTT)                                      

 

                B NATRIURETIC PEPTIDE 2022 11:41:00 [facility]

 

                PROCALCITONIN   2022 11:41:00 [facility]

 

                CREATINE KINASE, TOTAL 2022 11:41:00 [facility]



                (CPK)                                           

 

                C-REACTIVE PROTEIN 2022 11:41:00 [facility]

 

                INTERLEUKIN 6   2022 11:41:00 [facility]

 

                FERRITIN LEVEL  2022 11:41:00 [facility]

 

                D-DIMER         2022 11:41:00 [facility]

 

                LDH             2022 11:41:00 [facility]

 

                FIBRINOGEN      2022 11:41:00 [facility]

 

                ESTIMATED GFR   2022 11:41:00 [facility]

 

                RESPIRATORY PATHOGEN PANEL 2022 09:45:00 [facility]



                WITH COVID-19 RT-PCR                 All         

 

                XR CHEST 1 VW   2022 08:05:46 Louann Jaime 

sebastian Carrizales         

 

                BLOOD CULTURE, AEROBIC & 2022 07:49:00 Louann Jaime [facility]



                ANAEROBIC                       All         

 

                HC COMPLETE BLD COUNT 2022 07:49:00 Louann Jaime [facility]



                W/AUTO DIFF                     All         

 

                COMPREHENSIVE METABOLIC 2022 07:49:00 Louann Jaime [facility]



                PANEL                           All         

 

                TROPONIN T      2022 07:49:00 Shelby Calle [facility]

 

                B NATRIURETIC PEPTIDE 2022 07:49:00 [facility]



                                                All         

 

                ESTIMATED GFR   2022 07:49:00 Louann Jaime         

 

                ECG ED PRELIMINARY 2022 07:26:13 [facility]



                INTERPRETATION                  All         

 

                ECG 12-LEAD     2022 07:20:02 [facility]

sebastian Carrizales         

 

                CT ABDOMEN PELVIS WO 2022 23:38:27 Mahamed [facility]



                CONTRAST                                        

 

                HC COMPLETE BLD COUNT 2022 23:14:00 Mahamed [facility]/AUTO DIFF                                     

 

                COMPREHENSIVE METABOLIC 2022 23:14:00 Mahamed [facility]



                PANEL                                           

 

                LIPASE LEVEL    2022 23:14:00 Mahamed [facility]

 

                ESTIMATED GFR   2022 23:14:00 [facility]

 

                BASIC METABOLIC PANEL 2022 22:54:00 [facility]        

 

                ESTIMATED GFR   2022 22:54:00 [facility]        

 

                POC GLUCOSE     2022 22:04:00 Trev Neves [facility]

 

                CT ANGIOGRAM PE CHEST 2022 00:23:56 Emilia Bonilla [facility]

 

                IR VERTEBRO LUM UNI OR IVON 2022 19:25:00 Christine S[address]i [facility]

 

                ECG 12-LEAD     2022 18:05:39 Mariluz Noe [facility]

 

                BASIC METABOLIC PANEL 2022 10:16:00 Edinson [facility]

 

                HC COMPLETE BLD COUNT 2022 10:16:00 Edinson [facility]



                W/AUTO DIFF                                     

 

                PROTHROMBIN TIME WITH INR 2022 10:16:00 Edinson \[facility] [place] [facility]"" [facility]

 

                ESTIMATED GFR   2022 10:16:00 Edinson [facility]

 

                TYPE AND SCREEN 2022 10:16:00 [facility] Maryvaleluis [facility]

 

                TROPONIN T      2022 02:33:00 Edinson [facility]

 

                HEMODIALYSIS    2022 01:59:31 Beth-Rosy [facility]ani H

ospital



                                                Gabriela        

 

                TTE COMPLETE, WO CONTRAST, 2022 00:15:00 Trev Neves

ier [facility]



                W DOPPLER ([id number])                                 

 

                TROPONIN T      2022 23:10:00 Edinson [facility]

 

                TROPONIN T      2022 20:19:00 Edinson [facility]

 

                HEMODIALYSIS    2022 13:51:20 Beth-Roys [facility] H

ospital



                                                Gabriela        

 

                HC COMPLETE BLD COUNT 2022 11:10:00 Parveen [facility]



                W/AUTO DIFF                                     

 

                BASIC METABOLIC PANEL 2022 11:10:00 [facility]

 

                PARATHYROID HORMONE 2022 11:10:00 [facility]

 

                VITAMIN D 25 HYDROXY LEVEL 2022 11:10:00 [facility]

 

                DIGOXIN LEVEL   2022 11:10:00 [facility]

ospital



                                                Gabriela        

 

                PHOSPHORUS LEVEL 2022 11:10:00 Galo [facility]



                                                Gabriela        

 

                ESTIMATED GFR   2022 11:10:00 Parveen [facility]

 

                HC COMPLETE BLD COUNT 2022 09:45:00 Parveen [facility]



                W/AUTO DIFF                                     

 

                BASIC METABOLIC PANEL 2022 09:45:00 Parveen [facility]

 

                PROTHROMBIN TIME WITH INR 2022 09:45:00 Parveen [facility]

 

                ESTIMATED GFR   2022 09:45:00 Parveen [facility]

 

                GASTROINTESTINAL PANEL 2022 22:47:00 Nadia Mendez

[facility]

 

                XR CHEST 1 VW PORTABLE 2022 17:34:26 Parveen [facility]

 

                HC COMPLETE BLD COUNT 2022 10:01:00 Parveen [facility]



                W/AUTO DIFF                                     

 

                BASIC METABOLIC PANEL 2022 10:01:00 Parveen [facility]

 

                ESTIMATED GFR   2022 10:01:00 Parveen [facility]

 

                HEMODIALYSIS    2022 14:21:31 Janeen Rosetnhal 

[facility]        

 

                HC COMPLETE BLD COUNT 2022 10:11:00 Parveen [facility]



                W/AUTO DIFF                                     

 

                COMPREHENSIVE METABOLIC 2022 10:11:00 Parveen [facility]



                PANEL                                           

 

                ESTIMATED GFR   2022 10:11:00 Parveen [facility]

 

                MRI LUMBAR SPINE WO 2022 02:13:34 Rosalio Angulo    [facility]



                CONTRAST                                        

 

                POTASSIUM LEVEL 2022 18:15:00 [facility]

ospital



                                                Gabriela        

 

                HEPATITIS B SURFACE 2022 13:46:00 [facility]



                ANTIGEN                         Gabriela        

 

                HEPATITIS B SURFACE 2022 13:46:00 [facility]



                ANTIBODY                        Gabriela        

 

                TROPONIN T      2022 13:43:00 Rosalio Angulo 

spital

 

                HEMODIALYSIS    2022 13:16:08 Janeen Rosenthal H

ospital



                                                Gabriela        

 

                HC COMPLETE BLD COUNT 2022 11:21:00 Shaikh [facility]



                W/AUTO DIFF                                     

 

                PROTHROMBIN TIME WITH INR 2022 11:21:00 Shaikh [facility]

 

                COMPREHENSIVE METABOLIC 2022 11:21:00 [facility]



                PANEL                                           

 

                THYROID STIMULATING 2022 11:21:00 Angulo [facility]



                HORMONE                                         

 

                ESTIMATED GFR   2022 11:21:00 Rosalio Angulo 

spital

 

                ZZCOVID-19 ANTI-SPIKE IGG 2022 06:43:00 [facility]



                ANTIBODY TITER                                  

 

                COVID-19 SEROLOGY PATIENT 2022 06:43:00 Shaikh [facility]



                SURVEILLANCE                                    

 

                TROPONIN T      2022 06:43:00 Rosalio Angulo 

spital

 

                PROCALCITONIN   2022 06:43:00 Rosalio Angulo 

spital

 

                URINE CULTURE   2022 05:18:00 Nadia Mendez  [facility]ani 

spital

 

                COVID-19 QUALITATIVE 2022 [facility]41[facility]00 [facility]



                RT-PCR                                          

 

                URINALYSIS [facility]CREEN AND 2022 [facility]41[facility]



                MICROSCOPY, WITH REFLEX TO                                 



                CULTURE                                         

 

                CT ABDOMEN PELVIS WO 2022 03[facility]51:11 [facility]



                [facility]                                        

 

                CT [facility]UMBAR [facility]22 [facility]49[facility]20 [facility]



                [facility]                                        

 

                COMPREHENSIVE [facility]ABOLIC 2022 [facility]31[facility]00 [facility]



                [facility]                                           

 

                HC COMPLETE BLD COUNT 2022 03:31:00 [facility]essa [facility]



                W/AUTO DIFF                                     

 

                ESTIMATED GFR   2022 03:31:00 Nadia Mendez 

spital

 

                XR CHEST 1 VW   2022 03:22:49 Nadia Mendez 

spital

 

                ECG ED PRELIMINARY 2022 02[facility]44:33 [facility]



                INTERPRETATION                                  

 

                ECG 12-LEAD     2022 02:40:03 Rosalio Angulo 

spital

 

                HEMODIALYSIS    2022 14:28:03 Baranowska-Daca, [facility]

[facility]



                                                Gabriela        

 

                BASIC METABOLIC PANEL 2022 03:17:00 Baranowssandee-Daca, [facility]



                                                Gabriela        

 

                ESTIMATED GFR   2022 03:17:00 [facility]anowska-Daca, [facility]

[facility]



                                                Gabriela        

 

                POC GLUCOSE     2022 02:29:00 Melissa Pickett [facility]



                                                R               

 

                IR VERTEBRO CERVICAL AND 2022 20:19:56 SolipMelissa ernst

[facility]



                THOR UNI OR IVON                 R               

 

                IR VERTEBROPLASTY EA ADDL 2022 20:19:56 Melissa Pickett [facility]



                T OR L                          R               

 

                OK AN ELECTIVE  2022 19:47:00 Juan Jose Templeton [facility]



                ENDOTRACHEAL AIRWAY                                 

 

                HEMODIALYSIS    2022 20:14:38 [facility]olew-Daca, [facility]        

 

                BLOOD CULTURE, AEROBIC & 2022 20:28:00 Magy Earl [facility]



                ANAEROBIC                                       

 

                URINE CULTURE   2022 23:56:00 [facility]anowska-Daca, [facility]

[facility]



                                                Gabriela        

 

                CT RENAL STONE PROTOCOL 2022 23:39:33 [facility]ee[facility]aca, [facility]        

 

                URINALYSIS SCREEN AND 2022 22:29:00 Baranowssandee-Daca, [facility]



                MICROSCOPY, WITH REFLEX TO                 Gabriela        



                CULTURE                                         

 

                HEMODIALYSIS    2022 15:08:13 Baranowska-Daca, [facility]

[facility]



                                                Gabriela        

 

                HC COMPLETE BLD COUNT 2022 09:55:00 Solipsujata, [facility]



                W/AUTO DIFF                     R               

 

                BASIC METABOLIC PANEL 2022 09:55:00 Solstacey, [facility]



                                                R               

 

                PROTHROMBIN TIME WITH INR 2022 09:55:00 Nieves, Melissa tomas [facility]



                                                R               

 

                URIC ACID LEVEL 2022 09:55:00 Baranowska-Daca, [facility]

ospital



                                                Gabriela        

 

                DIGOXIN LEVEL   2022 09:55:00 Baranowska-Daca, [facility]

ospital



                                                Gabriela        

 

                CORTISOL LEVEL, AM 2022 09:55:00 Baranowska-Daca, [facility]        

 

                HEMOGLOBIN A1C  2022 09:55:00 Baranowska-Daca, [facility]

ospital



                                                Gabriela        

 

                AMYLASE LEVEL   2022 09:55:00 Baranowska-Daca, [facility]

ospital



                                                Gabriela        

 

                LIPASE LEVEL    2022 09:55:00 Baranowska-Daca, [facility]

ospital



                                                Gabriela        

 

                ESTIMATED GFR   2022 09:55:00 Nieves, [facility]



                                                R               

 

                VITAMIN D 25 HYDROXY LEVEL 2022 09:33:00 Baranowska-Daca, 

[facility]        

 

                PARATHYROID HORMONE 2022 09:33:00 Baranowska-Daca, [facility]        

 

                PHOSPHORUS LEVEL 2022 09:33:00 Baranowska-Daca, [facility]        

 

                XR SHOULDER 2+ VW RIGHT 2022 04:05:25 Baranowska-Daca, [facility]        

 

                HEMODIALYSIS    2022 03:41:52 Baranowska-Daca, [facility]

ospital



                                                Gabriela        

 

                MRI LUMBAR SPINE WO 2022 15:10:25 Bree Ivan [facility]



                CONTRAST                                        

 

                MRI THORACIC SPINE WO 2022 14:44:08 Bree Ivan [facility]



                CONTRAST                                        

 

                BASIC METABOLIC PANEL 2022 10:43:00 Baranowska-Daca, [facility]



                                                Gabriela        

 

                ESTIMATED GFR   2022 10:43:00 [facility]owska-Daca, [facility]

[facility]



                                                Gabriela        

 

                BASIC METABOLIC PANEL 2022 22:26:00 Baranowska-Daca, [facility]



                                                Gabriela        

 

                ESTIMATED GFR   2022 22:26:00 [facility]owska-Daca, [facility]

[facility]



                                                Gabriela        

 

                HEMODIALYSIS    2022 14:03:25 Baranowska-Daca, [facility]

[facility]



                                                Gabriela        

 

                HC COMPLETE BLD COUNT 2022 09:43:00 [facility]



                W/AUTO DIFF                     Tajdin          

 

                COMPREHENSIVE METABOLIC 2022 09:43:00 [facility]



                PANEL                           Tajdin          

 

                MAGNESIUM LEVEL 2022 09:43:00 [facility]



                                                Tajdin          

 

                ESTIMATED GFR   2022 09:43:00 [facility]



                                                Tajdin          

 

                TROPONIN T      2022 00:24:00 Tone Irby [facility]

 

                HEPATITIS B SURFACE 2022 00:24:00 [facility]



                ANTIGEN                         [facility]        

 

                HEPATITIS B SURFACE AB, 2022 00:24:00 Galo [facility]



                QUANTITATIVE                    Gabriela        

 

                HEMODIALYSIS    2022 22:22:37 Baranowska-Samsona, [facility]

[facility]



                                                Gabriela        

 

                THYROID ST[facility]ULATING 2022 21:32:00 [facility]



                H[facility]ONE                                         

 

                T4, FREE        2022 21:32:00 Esther Biggs 

spital

 

                TROPONIN T      2022 21:32:00 Esther Biggs 

spital

 

                COVID-19 QUALITATIVE 2022 21:22:00 Mahamed [facility]



                RT-PCR                                          

 

                CT LUMBAR SPINE WO 2022 20:55:41 Ray, [facility]



                CONTRAST                                        

 

                CT THORACIC SPINE WO 2022 20:53:28 Ray, [facility]



                CONTRAST                                        

 

                CT ANGIOGRAM PE CHEST 2022 20:45:21 Ray, [facility]

 

                ECG 12-LEAD     2022 19:35:11 Ray, [facility]

 

                XR THORACIC SPINE 2 VW 2022 19:17:36 Ray, [facility]

 

                HC COMPLETE BLD COUNT 2022 18:50:00 Ray, [facility]



                W/AUTO DIFF                                     

 

                COMPREHENSIVE METABOLIC 2022 18:50:00 Ray, [facility]



                PANEL                                           

 

                TROPONIN T      2022 18:50:00 Ray, [facility]

 

                ESTIMATED GFR   2022 18:50:00 Ray, [facility]

 

                LIPASE LEVEL    2022 18:50:00 Ray, [facility]

 

                ECG ED PRELIMINARY 2022 18:18:35 Ray, [facility]



                INTERPRETATION                                  

 

                XR CHEST 2 VW   2022 16:02:54 [facility]

[facility]

 

                Diabetic retinal eye 2020 06:00:00                 Dillan Mart



                exam<sup>1</sup>                                 

 

                Mammogram       2017-07-15 05:00:00                 Doreen Her hernandes

 

                Colonoscopy<sup>2</sup> 2017 06:00:00                 Hakeem

[facility] [place]

 

                OPEN REDUCTION INTERNAL 2016-03-10 22:13:00 Lei, Yoan   Hakeem

[facility] [place]



                FIXATION RADIUS                                 



                INTRA-ARTICULAR W/3                                 



                FRAGMENTS [id number]                                 



                (Right)<sup>1</sup>                                 

 

                Repair of       2016-03-10 06:00:00                 Doreen hernandes



                wrist<sup>3</sup>                                 

 

                [facility] cancer [facility] 2012 [facility]00                 [facility]



                [facility]                                             

 

                Cholecystectomy                                 [facility]

 

                Procedure<sup>2</sup>                                 [facility]

ermann

 

                Tubal ligation                                  [facility]

 

                Eye operation                                   [facility]

 

                Biopsy of breast                                 Doreen Hines

n

 

                bilateral radial                                 Doreen Hines

n



                kerototomy                                      

 

                bilateral tubal ligation                                 [facility] Mart

 

                colonoscopy                                     [facility]

 

                Skin cancer of                                  [facility]



                arm<sup>4</sup>                                 







Plan of Care







             Planned Activity Planned Date Details      Comments     Source

 

             Future Scheduled 2029   DTAP/TDAP/TD VACCINES              [facility]

[facility]



             Test         00:00:00     (2 - Td or Tdap) [code              Medic

al Center



                                       = DTAP/TDAP/TD              



                                       VACCINES (2 - Td or              



                                       Tdap)]                    

 

             Future Scheduled 2022   HEPATITIS B VACCINES              Met

[facility]



             Test         11:07:43     (1 of 3 - 3-dose              



                                       series) [code =              



                                       HEPATITIS B VACCINES              



                                       (1 of 3 - 3-dose              



                                       series)]                  

 

             Future Scheduled 2022   COVID-19 VACCINE (#1)              [facility]



             Test         11:07:43     [code = COVID-19              



                                       VACCINE (#1)]              

 

             Future Scheduled 2022   65+ PNEUMOCOCCAL              [facility]



             Test         11:07:43     VACCINE (1 - PCV)              



                                       [code = 65+               



                                       PNEUMOCOCCAL VACCINE              



                                       (1 - PCV)]                

 

             Future Scheduled 2022   SHINGLES VACCINES (1              Met

[facility]



             Test         11:07:43     of 2) [code = SHINGLES              



                                       VACCINES (1 of 2)]              

 

             Future Scheduled 2022   Screening for              [facility]



             Test         11:07:43     malignant neoplasm of              



                                       cervix (procedure)              



                                       [code = 383020288]              

 

             Future Scheduled 2022   COLONOSCOPY SCREENING              [facility]



             Test         11:07:43     [code = COLONOSCOPY              



                                       SCREENING]                

 

             Future Scheduled 2022   BREAST CANCER              [facility]



             Test         11:07:43     SCREENING [code =              



                                       BREAST CANCER              



                                       SCREENING]                

 

             Future Scheduled 2022   MEDICARE ANNUAL              [facility]



             Test         00:00:00     WELLNESS (YEAR 2 or              Medical 

Center



                                       FIRST YEAR if no IPPE)              



                                       [code = MEDICARE              



                                       ANNUAL WELLNESS (YEAR              



                                       2 or FIRST YEAR if no              



                                       IPPE)]                    

 

             Future Scheduled 2022   DEPRESSION SCREENING              [facility]



             Test         00:00:00     (12+) [code =              Medical Center



                                       DEPRESSION SCREENING              



                                       (12+)]                    

 

             Future Scheduled 2022   FALLS RISK SCREENING              [facility]



             Test         00:00:00     [code = FALLS RISK              Medical C

enter



                                       SCREENING]                

 

             Future Scheduled 2021   PNEUMOCOCCAL 65+ YRS              [facility]



             Test         00:00:00     (1 - PCV) [code =              Medical Ce

nter



                                       PNEUMOCOCCAL 65+ YRS              



                                       (1 - PCV)]                

 

             Future Scheduled 2006   SHINGLES VACCINES (1              [facility]



             Test         00:00:00     of 2) [code = SHINGLES              Medic

al Center



                                       VACCINES (1 of 2)]              

 

             Future Scheduled 1968   Tobacco Cessation              [facility]



             Test         00:00:00     Counseling and              Medical Cente

r



                                       Screening (12+) [code              



                                       = Tobacco Cessation              



                                       Counseling and              



                                       Screening (12+)]              

 

             Future Scheduled 1957   COVID-19 VACCINE (#1)              [facility]

[facility]



             Test         00:00:00     [code = COVID-19              [facility]

ter



                                       VACCINE (#1)]              

 

             Future Scheduled 1956   Screening for              [facility]

es



             Test         00:00:00     malignant neoplasm of              Medica

l Center



                                       breast (procedure)              



                                       [code = 955216247]              

 

             Future Scheduled 1956   CT Colonography              [facility]



             Test         00:00:00     (combo) [code = CT              Medical C

enter



                                       Colonography (combo)]              

 

             Future Scheduled 1956   Screening for              [facility]

es



             Test         00:00:00     malignant neoplasm of              Medica

l Center



                                       colon (procedure)              



                                       [code = 190446432]              

 

             Future Scheduled 1956   Screening for              [facility]

es



             Test         00:00:00     malignant neoplasm of              Medica

l Center



                                       colon (procedure)              



                                       [code = 333720576]              

 

             Future Scheduled 1956   DXA SCAN [code = DXA              [facility]



             Test         00:00:00     SCAN]                     [facility]

 

             Future Scheduled 1956   Screening for              [facility]

es



             Test         00:00:00     malignant neoplasm of              Medica

l Center



                                       colon (procedure)              



                                       [code = 180497360]              

 

             Future Scheduled 1956   Screening for              [facility]

es



             Test         00:00:00     malignant neoplasm of              Medica

l Center



                                       colon (procedure)              



                                       [code = 648971307]              

 

             Future Scheduled 1956   Sigmoidoscopy [code =              [facility]

[facility]



             Test         00:00:00     Sigmoidoscopy]              Medical Cente

r







Encounters







        Start   End     Encounter Admission Attending Care    Care    Encounter 

Source



        Date/Time Date/Time Type    Type    Clinicians Facility Department ID   

   

 

        2022         Inpatient EL      KHANH CuellarPM   ENDO    [id number]

[id number] [facility]



        [facility]00[facility]00                         [facility]

 

        [facility] Inpatient ER      LAVINIA BROUSSARD    Cardiology 20

[id number] [facility]



        14:20:00 12:50:00                 VINAYAK                             

 

        2022 [facility]         Luma Beckham [facility]   [id number]

 [id number] [facility] 

St



        14:20:00 12:50:00 Encounter         Sanket Marshall                

         [facility]

[facility]

 

        2022 Orders                  [facility]   [id number]

[id number] [facility]



        00:00:00 00:00:00 Only                                            [facility]

 

        2022 Outpatient                 [facility]     [id number]

[id number]



        00:00:00 23:59:00                                                 My



                                                                        Medicin



                                                                        e

 

        2022 Orders                  [facility]   [id number]

[id number] [facility]



        00:00:00 00:00:00 Only                                            [facility]

 

        2022 Hospital         [facility] 1.2.840.1 [id number]

[id number] [place]



        00:00:00 00:00:00 Encounter         SARBJIT [id number]         208     Me

thodi



                                                3.430.2.7                 st



                                                .3.046914                 



                                                .8                      

 

        2022 Travel                  1.2.840.1 1.2.127.506 [id number]

[id number] [facility]



        00:00:00 00:00:00                         85776.1.1 350.1.13.43 679     

st



                                                3.430.2.7 0.2.7.3.698         Ho

spita



                                                .3.462280 084.8           l



                                                .8                      

 

        2022 Orders          [place],  1.2.840.1 [id number]

[id number] [facility]i



        00:00:00 00:00:00 Only            Adriel 52086.1.1         [address]     st



                                        Baljinder 3.430.2.7                 Hosp

mimi



                                                .3.037600                 l



                                                .8                      

 

        2022-10-21 2022-10-21 Office          [place],  1.2.840.1 [id number]

[id number] [facility]i



        12:00:00 12:15:00 Visit           Adriel 95391.1.1         [address]     [address]



                                        Baljinder 3.430.2.7                 Hosp

mimi



                                                .3.122031                 l



                                                .8                      

 

        2022-10-21 2022-10-21 Travel                  1.2.840.1 1.2.250.323 [id number]

[id number] [facility]i



        00:00:00 00:00:00                         10585.1.1 350.1.13.43 055     

st



                                                3.430.2.7 0.2.7.3.698         Ho

spita



                                                .3.708173 084.8           l



                                                .8                      

 

        2022-10-12 2022-10-12 Travel                  1.2.840.1 1.2.994.778 [id number]

[id number] [facility]



        00:00:00 00:00:00                         24888.1.1 350.1.13.43 459     

st



                                                3.430.2.7 0.2.7.3.698         Ho

spita



                                                .3.604760 084.8           l



                                                .8                      

 

        2022-10-05 2022-10-05 [facility]  1.2.840.1 [id number]  [place]



        00:00:00 00:00:00 Encounter         ADRIEL 28387.1.1         622     M

ethodi



                                                3.430.2.7                 st



                                                .3.480416                 



                                                .8                      

 

        2022-10-05 2022-10-05 [facility]  1.2.840.1 [id number] 70 [place]



        00:00:00 00:00:00 Encounter         ADRIEL 44427.1.1         624     M

ethodi



                                                3.430.2.7                 st



                                                .3.264279                 



                                                .8                      

 

        2022-10-05 2022-10-05 [facility]  1.2.840.1 [id number]  [place]



        00:00:00 00:00:00 Encounter         ADRIEL 69688.1.1         626     M

ethodi



                                                3.430.2.7                 st



                                                .3.238790                 



                                                .8                      

 

        2022-10-05 2022-10-05 Travel                  1.2.840.1 1.2.514.183 9239

039825 Methodi



        00:00:00 00:00:00                         31593.1.1 350.1.13.43 702     

st



                                                3.430.2.7 0.2.7.3.698         Ho

spita



                                                .3.664100 084.8           l



                                                .8                      

 

        2022 Office          Vadim, 1.2.840.1 [id number]

9152 Methodi



        14:15:00 14:15:00 Visit           Daylin  16203.1.1         502     st



                                                3.430.2.7                 Hospit

a



                                                .3.888996                 l



                                                .8                      

 

        2022 Travel                  1.2.840.1 1.2.784.787 5617

431168 Methodi



        00:00:00 00:00:00                         61757.1.1 350.1.13.43 876     

st



                                                3.430.2.7 0.2.7.3.698         Ho

spita



                                                .3.891399 084.8           l



                                                .8                      

 

        2022 Office          [place],  1.2.840.1 [id number] [id number]

3139 Methodi



        12:45:00 13:22:26 Visit           Adriel 03363.1.1         547     st



                                        Baljinder 3.430.2.7                 Hosp

mimi



                                                .3.080957                 l



                                                .8                      

 

        2022 Travel                  1.2.840.1 1.2.685.099 2154

820823 Methodi



        00:00:00 00:00:00                         70530.1.1 350.1.13.43 541     

st



                                                3.430.2.7 0.2.7.3.698         Ho

spita



                                                .3.178171 084.8           l



                                                .8                      

 

          [facility]840[facility] 

[id number]                 [id number]                Methodi



        07:21:00 20:54:00 Encounter         Martina Douglass 63455.1.1         90

4     st



                                                3.430.2.7                 Hospit

a



                                                .3.614912                 l



                                                .8                      

 

        2022 Prep for         Lei, 1.2.840.1 [id number] 96932

07846 Methodi



        00:00:00 00:00:00 Surgery         Louann D 29574.1.1         190     s

t



                                                3.430.2.7                 Hospit

a



                                                .3.323499                 l



                                                .8                      

 

        2022 Travel                  1.2.840.1 1.2.290.024 7309

203112 Methodi



        00:00:00 00:00:00                         79913.1.1 350.1.13.43 602     

st



                                                3.430.2.7 0.2.7.3.698         Ho

spita



                                                .3.879171 084.8           l



                                                .8                      

 

        2022 Outpatient CLEMENTE Minaya   [facility]    [id number]

562 [facility]



        04:58:00 04:58:00                 Venancio                  89      [facility]

 

        2022 Outpatient CLEMENTE Minaya   [facility]    [id number]

090 [facility]



        06:37:00 06:37:00                 Venancio                  64      [facility]

 

        2022 Lab                     [facility]   [id number]

[address] [facility]



        00:00:00 00:00:00 Requisitio                                         [employer]

 

        2022 Lab                     [facility]   [id number]

[address] [facility]



        00:00:00 00:00:00 Requisitio                                         [place]

[facility]

 

        2022 [facility]         [facility]     476

806-202 [place]



        00:00:00 00:00:00                                         55463   [place]



                                                                        U[facility]

 

        2022 Emergency         Louann Jaime 1.2.840

.1 [id number] 

[id number]                              Methodi



        02:04:00 18:01:00                 Aki Dupree 36347.1.1         026     





                                        Shelby Calle 3.430.2.7          

       Hospita



                                        Fantasma Squires .3.220471                 

l



                                                .8                      

 

        2022 Travel                  1.2.840.1 1.2.475.767 2601

990583 Methodi



        00:00:00 00:00:00                         66623.1.1 350.1.13.43 552     

st



                                                3.430.2.7 0.2.7.3.698         Ho

spita



                                                .3.462087 084.8           l



                                                .8                      

 

        2022 Outpatient                 [facility]

[id number] Memoria



        21:20:00 04:59:59                         r       Medicine 56      l



                                                        Rudy Hines

n

 

        2022 Outpatient                 [facility]

[id number] [facility]



        21:20:00 04:59:59                         r       Medicine 56      l



                                                        Grantshyanne Hines

n

 

        2022 Outpatient         Coyne,  MG    [facility]    [id number]

365 



        16:20:00 23:59:59                 Charisse N                 56      

 

        2022 Outpatient                 [facility]    [facility]    [id number]

365 [facility]



        16:20:00 16:20:00                                         56      l



                                                                        Barron

 

        2022 Emergency         Maureen,  1.2.840.1 [id number] 

160043 Methodi



        17:10:00 22:30:00                 Martha   83690.1.1         503     [facility] 3.430.2.7                 Hosp

mimi



                                                .3.263508                 l



                                                .8                      

 

        2022 Travel                  1.2.840.1 1.2.504.094 1413

383556 Methodi



        00:00:00 00:00:00                         61563.1.1 350.1.13.43 329     

st



                                                3.430.2.7 0.2.7.3.698         Ho

spita



                                                .3.222266 084.8           l



                                                .8                      

 

        2022 [facility]         Parveen Hutchinson. 1.2.840.1 104

361805 

[id number]                              Methodi



        21:20:00 13:07:00 Encounter         Rosalio Angulo 47608.1.1         968  

   



                                        ParveenChristine 3.430.2.7         

        Hospita



                                        Trev Neves .3.225454          

       l



                                                .8                      

 

        2022 Anesthesia         Noe,   1.2.840.1 [id number]

[id number] Methodi



        13:41:00 14:30:00 Event           Mariluz  33183.1.1         538     st



                                        Laura  3.430.2.7                 Hospit

a



                                                .3.703373                 l



                                                .8                      

 

        2022 Travel                  1.2.840.1 1.2.797.216 3689

851216 Methodi



        00:00:00 00:00:00                         58529.1.1 350.1.13.43 714     

st



                                                3.430.2.7 0.2.7.3.698         Ho

spita



                                                .3.903771 084.8           l



                                                .8                      

 

        2022 Outpatient Suzie Ramos [facility]   DAYS    LA0

[id number] [facility]



        07:07:00 07:07:00                                         79      [facility]

 

        2022 Outpatient Suzie Ramos [facility]   F94

 [facility]



        07:07:00 07:07:00                                            [facility]

 

        2022 Phone                   [facility] 3467

[id number] Memoria



        15:25:04 04:59:59 Message                 r       Medicine 17      l



                                                        Rudy         Donny martinez

 

        2022 Phone                   [facility] 3467

[id number] Memoria



        15:25:04 04:59:59 Message                 r       Medicine 17      l



                                                        Rudy martinez

 

        2022 Outpatient                 [facility]    [id number]

355 



        10:25:04 23:59:59                                         17      

 

        2022 Emergency EM      Hadley Medina [facility]   [place]    LA00

761560 [facility]



        12:08:00 15:51:00                                         00      [facility]

 

        2022 Emergency EM      Hadley Medina [facility]   F945

77 [facility]



        12:08:00 15:51:00                                            [facility]

 

        2022 Outpatient RUFINA Cuellar [facility]   ENDO    

87581 [facility]



        07:10:00 07:10:00                 Clark                   92      [facility]

 

        2022 Ambulatory                 [facility]

[id number] [facility]



        15:15:00 15:15:00 Pre-Reg                 r       Medicine 54      l



                                                        Rudy Hines

n

 

        2022 Ambulatory                 [facility]

[id number] [facility]



        15:15:00 15:15:00 Pre-Reg                 r       Medicine 54      l



                                                        Rudy Hines

n

 

        2022 Outpatient                 [facility]    [id number]

365 [facility]



        10:15:00 10:15:00                                         54      dennis Mart

 

        2022 Outpatient         C[place]ne,  [facility]    [facility]    [id number]

365 



        10:15:00 10:15:00                 Charisse MARTINEZ                 54      

 

        2022 Outpatient                 [facility]    [id number]

365 [facility]



        10:30:00 10:30:00                                         55      dennis



                                                                        Barron

 

        2022 Outpatient                 [facility]    [id number]

365 [facility]



        10:30:00 10:30:00                                         55      dennis



                                                                        Barron

 

        2022 Patient         Joana Adan 1.2.840.1 [id number]

[id number] 

Methodi



        00:00:00 00:00:00 Outreach                 85667.1.1         020     st



                                                3.430.2.7                 Hospit

a



                                                .3.692440                 l



                                                .8                      

 

        2022 Office          Vadim, 1.2.840.1 [id number]

0540 Methodi



        10:15:00 11:21:59 Visit           Daylin  46244.1.1         779     st



                                                3.430.2.7                 Hospit

a



                                                .3.258283                 l



                                                .8                      

 

        2022 Travel                  1.2.840.1 1.2.552.220 7310

163704 Methodi



        00:00:00 00:00:00                         76348.1.1 350.1.13.43 779     

st



                                                3.430.2.7 0.2.7.3.698         Ho

spita



                                                .3.458787 084.8           l



                                                .8                      

 

        2022 Hospital         Suresh Pineda 1.2.840.1 1

[id number] 

[id number]                              Methodi



        12:16:00 16:05:00 Encounter         Melissa Pickett 49804.1.1   

      492     st



                                                3.430.2.7                 Hospit

a



                                                .3.732069                 l



                                                .8                      

 

        2022 Anesthesia         Naseem Willis 1.2.8

40.1 [id number] 

[id number]                              Methodi



        13:07:00 14:43:00 Event           Elaina Clark 80055.1.1         65

0     st



                                                3.430.2.7                 Hospit

a



                                                .3.561822                 l



                                                .8                      

 

        2022 Telephone         Vadim 1.2.840.1 [id number] 2100

511066 Methodi



        00:00:00 00:00:00                 Daylin  68827.1.1         006     st



                                                3.430.2.7                 Hospit

a



                                                .3.558871                 l



                                                .8                      

 

        2022 Phone                   [facility] 3467

[id number] Memoria



        17:07:02 05:59:59 Message                 r       Medicine 16      l



                                                        Rudy Hines

michelle

 

        2022 Phone                   [facility] 3467

[id number] Memoria



        17:07:02 05:59:59 Message                 r       Medicine 16      l



                                                        Rudy Hines

michelle

 

        2022 Outpatient                 [facility]    [id number]

355 



        11:07:02 23:59:59                                         16      

 

        2022 Travel                  1.2.840.1 1.2.364.185 5486

360007 Methodi



        00:00:00 00:00:00                         77060.1.1 350.1.13.43 996     

st



                                                3.430.2.7 0.2.7.3.698         Ho

spita



                                                .3.115878 084.8           l



                                                .8                      

 

        2022 Office          Reynaldo,  1.2.840.1 [id number] 225449

7744 Methodi



        16:30:00 16:34:07 Visit           Brenna 87997.1.1         169     st



                                                3.430.2.7                 Hospit

a



                                                .3.414072                 l



                                                .8                      

 

        2022 [facility],  1.2.840.1 [id number] 29593

19457 Methodi



        09:45:00 23:59:00 Encounter         Brenna 49748.1.1         979     

st



                                                3.430.2.7                 Hospit

a



                                                .3.317101                 l



                                                .8                      

 

        2022 Travel                  1.2.840.1 1.2.341.010 1106

590738 Methodi



        00:00:00 00:00:00                         07300.1.1 350.1.13.43 961     

st



                                                3.430.2.7 0.2.7.3.698         Ho

spita



                                                .3.801554 084.8           l



                                                .8                      

 

        2022 Phone                   [facility]

[id number] [facility]oria



        19:33:53 05:59:59 Message                 r       Medicine 15      l



                                                        Rudy Hines

michelle

 

        2022 Phone                   [facility] 3467

[id number] Memoria



        19:33:53 05:59:59 Message                 r       Medicine 15      l



                                                        Grant         Donny martinez

 

        2022 Outpatient                 [facility]    [id number]

355 



        13:33:53 23:59:59                                         15      

 

        2021 Phone                   [facility]67

[id number] [facility]oria



        19:38:03 05:59:59 Message                 r       Medicine 14      l



                                                        Rudy Hines

michelle

 

        2021 Phone                   [facility]

[id number] [facility]



        19:38:03 05:59:59 Message                 r       Medicine 14      dennis martinez

 

        2021 Outpatient                 [facility]    [id number]

355 



        13:38:03 23:59:59                                         14      

 

        2021 Outpatient                 [facility]

[id number] [facility]



        21:15:00 05:59:59                         r       Medicine 53      l



                                                        Rudy martinez

 

        2021 Outpatient                 [facility]

[id number] [facility]



        21:15:00 05:59:59                         r       Medicine 53      l



                                                        Grant         Donny

n

 

        2021 Outpatient         Peter,  [facility]    [id number]

365 



        15:15:00 23:59:59                 Charisse N                 53      

 

        2021 Outpatient                 TATUM    [facility]    [id number]

365 Memoria



        15:15:00 15:15:00                                         53      dennis



                                                                        Barron

 

        2021 Office          [place],  1.2.840.1 [id number]

2742 Methodi



        15:30:00 16:01:04 Visit           Brenna 89561.1.1         834     st



                                                3.430.2.7                 Hospit

a



                                                .3.025922                 l



                                                .8                      

 

        2021 Travel                  1.2.840.1 1.2.168.291 9433

857643 Methodi



        00:00:00 00:00:00                         38007.1.1 350.1.13.43 575     

st



                                                3.430.2.7 0.2.7.3.698         Ho

spita



                                                .3.863249 084.8           l



                                                .8                      

 

        2021 Between                 [facility] 3467

[id number] Memoria



        14:18:24 14:18:24 Visit                   r       Medicine 62      l



                                                        Rudy martinez

 

        2021 Between                 [facility] 3467

[id number] Memoria



        14:18:24 14:18:24 Visit                   r       Medicine 62      l



                                                        Rudy martinez

 

        2021 Outpatient                 [facility]    [id number]

375 



        08:18:24 08:18:24                                         62      

 

        2021 Between                 [facility] 3467

177637 Memoria



        00:56:47 00:56:47 Visit                   r       Medicine 61      l



                                                        Rudy martinez

 

        2021 Between                 [facility] 3467

[id number] Memoria



        00:56:47 00:56:47 Visit                   r       Medicine 61      l



                                                        Rudy martinez

 

        2021 Outpatient                 [facility]    [facility]    [id number]

375 



        18:56:47 18:56:47                                         61      

 

        2021 Outpatient                 [facility]

[id number] [facility]



        20:00:00 05:59:59                         r       Medicine 52      dennis Hines

n

 

        2021 Outpatient                 [facility]

[id number] [facility]



        20:00:00 05:59:59                         r       Medicine 52      dennis Hines

n

 

        2021 Outpatient         C[place]ne,  [facility]    [facility]    [id number]

365 



        14:00:00 23:59:59                 Charisse N                 52      

 

        2021 Outpatient                 [facility]    [id number]

365 [facility]



        14:00:00 14:00:00                                         52      dennis Mart

 

        2021 Phone                   [facility]

[id number] [facility]oria



        21:38:38 05:59:59 Message                 r       Medicine 13      dennis Hines

n

 

        2021 Phone                   [facility]

[id number] [facility]



        21:38:38 05:59:59 Message                 r       Medicine 13      dennis martinez

 

        2021 Outpatient                 [facility]    [facility]    [id number]

355 



        15:38:38 23:59:59                                         13      

 

        2021 Office          Ekeruo, 1.2.840.1 [id number]

2744 Methodi



        11:00:00 11:44:58 Visit           Daylin  13456.1.1         513     st



                                                3.430.2.7                 Hospit

a



                                                .3.582640                 l



                                                .8                      

 

        2021 Travel                  1.2.840.1 1.2.068.337 0879

766836 Methodi



        00:00:00 00:00:00                         30825.1.1 350.1.13.43 808     

st



                                                3.430.2.7 0.2.7.3.698         Ho

spita



                                                .3.409285 084.8           l



                                                .8                      

 

        2021-11-15 2021 Between                 [facility]

[id number] Memoria



        15:33:59 15:33:59 Visit                   r       Medicine 59      l



                                                        Rudy Hines

n

 

        2021-11-15 2021 Between                 [facility] 3467

[id number] Memoria



        15:33:59 15:33:59 Visit                   r       Medicine 59      l



                                                        Rudy Hines

n

 

        2021-11-15 2021 Outpatient                 [facility]    [id number]

375 



        09:33:59 09:33:59                                         59      

 

        2021 2021-10-05 Inpatient         SOLIPURAM, [facility]     074     [id number]

[address] [place]



        00:00:00 00:00:00                 MELISSA                    329     Method

i



                                                                        st

 

        2021 Outpatient         GC_EAH_Brow PRIV    PRIV    140

[id number] [facility]



        00:00:00 00:00:00                 n_J                     [id number] [facility]

l

 

        2021 Outpatient         OPPERMANN, [facility]     2100

202488 [place]



        00:00:00 00:00:00                 JULIANNA                 104     Method

i



                                                                        st

 

        2021 Outpatient         AHMED,  [facility]     [id number]

765 [place]



        00:00:00 00:00:00                 RAZIUDDIN                 273     Meth

farhana



                                                                        

 

        2021 Outpatient         EKERUO, [facility]     [id number]

411 [place]



        00:00:00 00:00:00                 DAYLIN                  787     Method

i



                                                                        

 

        2021 Outpatient         DAOURA, [facility]     [id number]

587 [place]



        00:00:00 00:00:00                 MAGY                 546     Method

i



                                                                        st

 

        2021 Inpatient         MATHIVANAN, [facility]     064     2100

[id number] [place]



        00:00:00 00:00:00                 MELVIN                   275     Method

i



                                                                        st

 

        2021 Outpatient         AHMED,  [facility]     [id number]

068 [place]



        00:00:00 00:00:00                 RAZIUDDIN                 199     Meth

farhana



                                                                        st

 

        2021 Outpatient         EKERUO, [facility]     021     [id number]

337 [place]



        00:00:00 00:00:00                 DAYLIN                  640     Method

i



                                                                        st

 

        2021 Outpatient         EKERUO, [facility]     [id number]

412 [place]



        00:00:00 00:00:00                 DAYLIN                  435     Method

i



                                                                        st

 

        2021 Outpatient         EKERUO, [facility]     [id number]

412 [place]



        00:00:00 00:00:00                 DAYLIN                  537     Method

i



                                                                        st

 

        2021 Outpatient         EKERUO, [facility]     [id number]

029 [place]



        00:00:00 00:00:00                 DAYLIN                  709     Method

i



                                                                        st

 

        2021 Inpatient         SOLIPURAM, [facility]     064     [id number]

88922 [place]



        00:00:00 00:00:00                 MELISSA                    685     Method

i



                                                                        st

 

        2021 Outpatient         AHMED,  [facility]     [id number]

046 [place]



        00:00:00 00:00:00                 RAZIUDDIN                 101     Meth

farhana



                                                                        

 

        2021 Outpatient                 [facility]

[id number] [facility]



        01:00:00 04:59:00                         r       [place] 58      l



                                                        [place]         Meredith



 

        2021 Outpatient                 [facility]

[id number] [facility]



        01:00:00 04:59:00                         r       [place] 58      l



                                                        [place]         Meredith



 

        2021 Outpatient         Ahmed,  [facility]    [facility]    [id number]

375 



        20:00:00 23:59:00                 Raziuddin                 58      

 

        2021 Outpatient         AHMED,  [facility]    PUL     [id number]   

 [facility]



        20:00:00 23:59:00                 RAZIUDDIN                         

 

        2021 Outpatient         AHMED,  [facility]     [id number]

900 [place]



        00:00:00 00:00:00                 RAZIUDDIN                 598     Meth

farhana



                                                                        st

 

        2021-04-15 2021 Inpatient         PRATIMAIVANAN, [facility]     064     2100

[id number] [place]



        00:00:00 00:00:00                 MELVIN                   060     Method

i



                                                                        st

 

        2021 Outpatient                 [facility]     [id number]

422 [place]



        00:00:00 00:00:00                                         470     Method

i



                                                                        st

 

        2021 Outpatient                 [facility]

[id number] [facility]



        19:30:00 04:59:59                         r       Medicine 51      l



                                                        Grant         Donny

n

 

        2021 Outpatient                 [facility]

[id number] [facility]



        19:30:00 04:59:59                         r       Medicine 51      l



                                                        Rudy Hines

n

 

        2021 Outpatient         [place],  MG    MG    [id number]

365 



        14:30:00 23:59:59                 Charisse N                 51      

 

        2021 Outpatient                 [facility]    IE    [id number]

365 [facility]



        14:30:00 14:30:00                                         51      l



                                                                        [place]

 

        2021 Outpatient                 [facility]     [id number]

901 [place]



        00:00:00 00:00:00                                         398     Method

i



                                                                        st

 

        2021 Between                 [facility]

203430 [facility]



        13:38:03 13:38:03 Visit                   r       Medicine 57      l



                                                        Grant         Donny

n

 

        2021 Between                 [facility]

[id number] [facility]



        13:38:03 13:38:03 Visit                   r       Medicine 57      l



                                                        Grant         Donny

n

 

        2021 Outpatient                 MG    MG    [id number]

375 



        08:38:03 08:38:03                                         57      

 

        2021 Outpatient         REYNALDO,  [facility]     [id number]

980 [place]



        00:00:00 00:00:00                 RAZIUDDIN                 554     Meth

farhana



                                                                        st

 

        2021 Inpatient         ANAIS [facility]     025     2100

272721 [place]



        00:00:00 00:00:00                 MELVIN                   541     Method

i



                                                                        

 

        2021 Inpatient         ANAIS [facility]     Ayse     2100

284552 [place]



        00:00:00 00:00:00                 MELVIN                   559     Method

i



                                                                        

 

        2020 Inpatient         PRATIMADWAIN [facility]     012     2100

456488 [place]



        00:00:00 00:00:00                 MELVIN                   271     Method

i



                                                                        st

 

        2020 Outpatient                 [facility]

[id number] [facility]



        16:30:00 05:59:59                         r       Medicine 50      l



                                                        Rudy Hines

n

 

        2020 Outpatient                 [facility]

[id number] [facility]



        16:30:00 05:59:59                         r       Medicine 50      l



                                                        Rudy Hines

n

 

        2020 Outpatient         [place],  MG    [facility]    [id number]

365 



        10:30:00 23:59:59                 Charisse N                 50      

 

        2020 Outpatient                 [facility]    [facility]    [id number]

365 [facility]



        10:30:00 10:30:00                                         50      l



                                                                        [place]

 

        2020 Inpatient         SOLIPURAM, [facility]     012     [id number]

36898 [place]



        00:00:00 00:00:00                 MELISSA                    464     Method

i



                                                                        

 

        2020-10-23 2020 Inpatient         SOLIPURA, [facility]     012     [id number]

36306 [place]



        00:00:00 00:00:00                 MELISSA                    557     Method

i



                                                                        

 

        2020-10-23 2020-10-24 Outpt Diag                 [facility]    [id number]

55246 [facility]



        18:10:00 04:59:00 Services                 r       Outpatient 06      l



                                                        Imaging         [place]



                                                        Jennings         

 

        2020-10-23 2020-10-24 Outpt Diag                 [facility]    [id number]

01292 [facility]



        18:10:00 04:59:00 Services                 r       Outpatient 06      l



                                                        Imaging         [place]



                                                        Jennings         

 

        2020-10-23 2020-10-23 Outpatient         [place], 29    29    [id number]

7385 



        13:10:00 23:59:00                 Abdi MARTINEZ                 06      

 

        2020-10-21 2020-10-22 Between                 [facility] 3467

[id number] [facility]



        17:58:19 17:58:19 Visit                   r       Medicine 56      l



                                                        Rudy Hines

n

 

        2020-10-21 2020-10-22 Between                 [facility]67

[id number] [facility]



        17:58:19 17:58:19 Visit                   r       Medicine 56      l



                                                        Rudy Hines

n

 

        2020-10-21 2020-10-22 Outpatient                 MG    [facility]    [id number]

375 



        12:58:19 12:58:19                                         56      

 

        2020-10-13 2020-10-14 Outpatient                 [facility]

[id number] [facility]



        15:15:00 04:59:59                         r       Medicine 49      l



                                                        Rudy Hines

n

 

        2020-10-13 2020-10-14 Outpatient                 [facility]

[id number] [facility]



        15:15:00 04:59:59                         r       Medicine 49      l



                                                        Rudy Hines

n

 

        2020-10-13 2020-10-13 Outpatient         Ocyne,  MG    [facility]    [id number]

365 



        10:15:00 23:59:59                 Charisse MARTINEZ                 49      

 

        2020-10-13 2020-10-13 Outpatient                 [facility]    [facility]    [id number]

365 [facility]



        10:15:00 10:15:00                                         49      l



                                                                        [place]

 

        2020 Outpt Diag                 [facility]    [id number]

00366 [facility]



        17:02:00 04:59:00 Services                 r       Outpatient 05      l



                                                        Imaging         [place]



                                                        Jennings         

 

        2020 Outpt Diag                 [facility]    [id number]

09570 [facility]



        17:02:00 04:59:00 Services                 r       Outpatient 05      l



                                                        Imaging         [place]



                                                        Jennings         

 

        2020 Outpatient         [place], 29    [facility]    [id number]

7385 



        12:02:00 23:59:00                 Abdi N                 05      

 

        2020 Ambulatory                 [facility]    [id number]

10093 [facility]



        19:00:00 19:00:00 Pre-Reg                 r       Nephrology 46      l



                                                        Rudy Hines

n

 

        2020 Ambulatory                 [facility]    [id number]

72599 [facility]



        19:00:00 19:00:00 Pre-Reg                 r       Nephrology 46      l



                                                        Rudy Hines

n

 

        2020 Outpatient                 [facility]    [facility]    [id number]

365 [facility]



        14:00:00 14:00:00                                         46      l



                                                                        [place]

 

        2020 Outpatient         Beth- MG    [facility]    346

[id number] 



        14:00:00 14:00:00                 Jese Gallegos                         

 

        2020 Outpatient                 [facility]    [facility]    [id number]

365 [facility]



        11:15:00 11:15:00                                         47      l



                                                                        [place]

 

        2020 Outpatient                 [facility]    [facility]    [id number]

365 [facility]



        11:15:00 11:15:00                                         47      dennis



                                                                        [place]

 

        2020 Outpatient                 [facility]    [id number]

[id number] [facility]



        19:45:00 04:59:59                         r       Nephrology 48      l



                                                        Rudy Hines

michelle

 

        2020 Outpatient                 [facility]    [id number]

66674 [facility]



        19:45:00 04:59:59                         r       Nephrology 48      l



                                                        Rudy Hines

michelle

 

        2020 Outpatient         [place]    346

[id number] 



        14:45:00 23:59:59                 Daca,                   48      



                                        Gabriela J                         

 

        2020 Outpatient                 [facility]    [facility]    [id number]

365 [facility]



        14:45:00 14:45:00                                         48      dennis



                                                                        [place]

 

        2020 [facility]         [facility]     [id number]

[address] [place] [address]



        00:00:00 00:00:00                 EMILIA                    832     Method

i



                                                                        st

 

        2020 Phone                   [facility]    [id number]

55 [facility]



        16:17:50 04:59:59 Message                 r       Nephrology 12      dennis Hines

michelle

 

        2020 Phone                   [facility]    [id number]

55 [facility]



        16:17:50 04:59:59 Message                 r       Nephrology 12      dennis Hines

michelle

 

        2020 Outpatient                 MG    MG    [id number]

355 



        11:17:50 23:59:59                                         12      

 

        2020 Outpatient                 [facility]    [id number]

[id number] [facility]



        19:00:00 04:59:59                         r       Nephrology 41      l



                                                        Rudy Hines

michelle

 

        2020 Outpatient                 [facility]    [id number]

[id number] [facility]



        19:00:00 04:59:59                         r       Nephrology 41      l



                                                        Rudy Hines

michelle

 

        2020 Outpatient         [place]    346

[id number] 



        14:00:00 23:59:59                 Daca,                   41      



                                        Gabriela J                         

 

        2020 Outpatient                 [facility]    [facility]    [id number]

365 [facility]



        14:00:00 14:00:00                                         41      dennis Mart

 

        2020 Phone                   [facility]    [id number]

55 [facility]



        18:35:51 04:59:59 Message                 r       Nephrology 11      dennis Mart

 

        2020 Phone                   [facility]    [id number]

55 [facility]



        18:35:51 04:59:59 Message                 r       Nephrology 11      dennis Mart

 

        2020 Outpatient                 [facility]    [facility]    [id number]

355 



        13:35:51 23:59:59                                         11      

 

        2020 Outpatient                 [facility]

[id number] [facility]



        15:15:00 04:59:59                         r       Medicine 45      l



                                                        Rudy Hines

n

 

        2020 Outpatient                 [facility]

[id number] [facility]



        15:15:00 04:59:59                         r       Medicine 45      dennis Hines

n

 

        2020 Outpatient         [place],  [facility]    MG    [id number]

365 



        10:15:00 23:59:59                 Charisse N                 45      

 

        2020 Ambulatory                 [facility]

[id number] [facility]



        15:15:00 15:15:00 Pre-Reg                 r       Medicine 44      dennis Hines

n

 

        2020 Ambulatory                 [facility]

[id number] [facility]



        15:15:00 15:15:00 Pre-Reg                 r       Medicine 44      dennis Hines

n

 

        2020 Outpatient                 [facility]    IE    [id number]

365 [facility]



        10:15:00 10:15:00                                         45      dennis Rosarioann

 

        2020 Outpatient                 [facility]    [facility]    [id number]

365 [facility]



        10:15:00 10:15:00                                         44      dennis Mart

 

        2020 Outpatient         [place],  MG    MG    [id number]

365 



        10:15:00 10:15:00                 Charisse N                 44      

 

        2020 Phone                   [facility]    [id number]

55 [facility]



        13:23:32 04:59:59 Message                 r       Nephrology 10      dennis Hines

n

 

        2020 Phone                   [facility]    [id number]

55 [facility]



        13:23:32 04:59:59 Message                 r       Nephrology 10      dennis Hines

michelle

 

        2020 Outpatient                 MG    [facility]    [id number]

355 



        08:23:32 23:59:59                                         10      

 

        2020 Between                 [facility] 3467

[id number] [facility]



        14:14:54 14:14:54 Visit                   r       Medicine 52      dennis Hines

michelle

 

        2020 Between                 [facility] 3467

[id number] [facility]



        14:14:54 14:14:54 Visit                   r       Medicine 52      ednnis Hines

michelle

 

        2020 Outpatient                 [facility]    MG    [id number]

375 



        09:14:54 09:14:54                                         52      

 

        2020 Outpatient                 [facility]    [facility]    [id number]

365 [facility]



        11:30:00 11:30:00                                         43      dennis



                                                                        [place]

 

        2020 Outpatient                 [facility]    [facility]    [id number]

365 [facility]



        11:30:00 11:30:00                                         43      dennis



                                                                        [place]

 

        2020 2020-04-15 Phone                   [facility] 3467

[id number] [facility]



        20:00:15 04:59:59 Message                 r       Medicine 09      dennis Hines

michelle

 

        2020 2020-04-15 Phone                   [facility] 3467

[id number] [facility]



        20:00:15 04:59:59 Message                 r       Medicine 09      dennis Hines

michelle

 

        2020 Outpatient                 MG    [facility]    [id number]

355 



        15:00:15 23:59:59                                         09      

 

        2020-04-10 2020 Phone                   [facility] 3467

[id number] [facility]



        17:18:28 04:59:59 Message                 r       Medicine 08      dennis Hines

michelle

 

        2020-04-10 2020 Phone                   [facility] 3467

[id number] [facility]



        17:18:28 04:59:59 Message                 r       Medicine 08      dennis Hines

michelle

 

        2020-04-10 2020 Phone                   [facility]    [id number]

55 [facility]



        13:26:55 04:59:59 Message                 r       Nephrology 07      dennis Rosariosylvie martinez

 

        2020-04-10 2020 Phone                   [facility]    [id number]

55 [facility]



        13:26:55 04:59:59 Message                 r       Nephrology 07      l



                                                        Rudy Hines

n

 

        2020-04-10 2020 Outpatient                 MG    MG    [id number]

355 



        12:18:28 23:59:59                                         08      

 

        2020-04-10 2020 Outpatient                 [facility]    MG    [id number]

355 



        08:26:55 23:59:59                                         07      

 

        2020 Outpatient                 [facility]

[id number] [facility]



        20:15:00 05:59:59                         r       Medicine 42      dennis Hines

n

 

        2020 Outpatient                 [facility]

[id number] [facility]



        20:15:00 05:59:59                         r       Medicine 42      l



                                                        Rudy martinez

 

        2020 Outpatient         [place],  MG    [facility]    [id number]

365 



        14:15:00 23:59:59                 Charisse MARTINEZ                 42      

 

        2020 Outpatient                 [facility]    [facility]    [id number]

365 [facility]



        14:15:00 14:15:00                                         42      dennis



                                                                        [place]

 

        2020 Phone                   [facility] 3467

[id number] [facility]



        14:22:27 05:59:59 Message                 r       Medicine 06      dennis Hines

michelle

 

        2020 Phone                   [facility] 3467

[id number] [facility]



        14:22:27 05:59:59 Message                 r       Medicine 06      dennis Hines

michelle

 

        2020 Outpatient                 MG    [facility]    [id number]

355 



        08:22:27 23:59:59                                         2019 Phone                   nullFlavo [facility] Family 3467

[id number] Memoria



        16:06:04 05:59:59 Message                 r       Medicine 05      dennis Hines

michelle

 

        2019 Phone                   nullFlavo [facility] 3467

[id number] Memoria



        16:06:04 05:59:59 Message                 r       Medicine 05      dennis Hines

michelle

 

        2019 Outpatient                 MG    [facility]    [id number]

355 



        10:06:04 23:59:59                                         2019 Between                 nullFlavo     [id number]

75 Memoria



        20:05:03 20:05:03 Visit                   r       Nephrology 45      l



                                                        Saundra Mart

 

        2019 Between                 nullFlavo [facility]    [id number]

75 Memoria



        20:05:03 20:05:03 Visit                   r       Nephrology 45      dennis Mart

 

        2019 Outpatient                 [facility]    [id number]

375 



        14:05:03 14:05:03                                         45      

 

        2019 Outpatient                 nullFlavo [facility]    [id number]

[id number] Memoria



        20:45:00 05:59:59                         r       Nephrology 38      dennis Hines

n

 

        2019 Outpatient                 nullFlavo [facility]    [id number]

[id number] Memoria



        20:45:00 05:59:59                         r       Nephrology 38      dennis Hines

n

 

        2019 [facility]    346

[id number] 



        14:45:00 23:59:59                 [place],                   38      



                                        Gabriela CLARK                         

 

        2019 Outpatient                 IE    IE    [id number]

365 Memoria



        14:45:00 14:45:00                                         38      dennis Mart

 

        2019 Between                 nullFlavo [facility]    [id number]

75 Memoria



        00:07:10 00:07:10 Visit                   r       Nephrology 43      dennis Mart

 

        2019 Between                 nullFlavo [facility]    [id number]

75 Memoria



        00:07:10 00:07:10 Visit                   r       Nephrology 43      dennis Mart

 

        2019 Outpatient                 [facility]    [id number]

375 



        18:07:10 18:07:10                                         43      

 

        2019 Outpatient                 IE    IE    [id number]

365 Memoria



        09:00:00 09:00:00                                         39      dennis Mart

 

        2019 Outpatient                 IE    IE    [id number]

365 Memoria



        09:00:00 09:00:00                                         39      dennis



                                                                        [place]

 

        2019 Between                 nullFlavo [facility] 3467

[id number] Memoria



        21:47:12 21:47:12 Visit                   r       Medicine 41      l



                                                        Rudy Hines

michelle

 

        2019 Between                 nullFlavo [facility] 3467

[id number] Memoria



        21:47:12 21:47:12 Visit                   r       Medicine 41      dennis Hines

n

 

        2019 Outpatient                 [facility]    [facility]    [id number]

375 



        15:47:12 15:47:12                                         41      

 

        2019-10-29 2019-10-30 Between                 nullFlavo [facility] 3467

[id number] Memoria



        22:13:13 22:13:13 Visit                   r       Medicine 40      dennis Hines

n

 

        2019-10-29 2019-10-30 Between                 nullFlavo [facility] 3467

[id number] Memoria



        22:13:13 22:13:13 Visit                   r       Medicine 40      dennis Hines

n

 

        2019-10-29 2019-10-30 Outpatient                 [facility]    [facility]    [id number]

375 



        17:13:13 17:13:13                                         40      

 

        2019-10-25 2019-10-26 Outpatient                 nullFlavo [facility] 3

[id number] Memoria



        14:45:00 04:59:59                         r       Medicine 40      dennis Hines

n

 

        2019-10-25 2019-10-26 Outpatient                 nullFlavo [facility] 3

[id number] Memoria



        14:45:00 04:59:59                         r       Medicine 40      dennis Hines

n

 

        2019-10-25 2019-10-25 Outpatient         [place],  [facility]    MG    [id number]

365 



        09:45:00 23:59:59                 Charisse N                 40      

 

        2019-10-25 2019-10-25 Outpatient                 [facility]    [facility]    [id number]

365 Memoria



        09:45:00 09:45:00                                         40      dennis Mart

 

        2019-10-17 2019-10-17 Ambulatory                 nullFlavo [facility] 3

[id number] Memoria



        16:00:00 16:00:00 Pre-Reg                 r       Medicine 36      dennis Hines

n

 

        2019-10-17 2019-10-17 Ambulatory                 nullFlavo [facility] 3

[id number] Memoria



        16:00:00 16:00:00 Pre-Reg                 r       Medicine 36      dennis Hines

n

 

        2019-10-17 2019-10-17 Outpatient                 [facility]    [facility]    [id number]

365 Memoria



        11:00:00 11:00:00                                         36      dennis Mart

 

        2019-10-17 2019-10-17 Outpatient         [place],  [facility]    MG    [id number]

365 



        11:00:00 11:00:00                 Charisse N                 36      

 

        2019-10-10 2019-10-11 Outpatient                 nullFlavo [facility]    [id number]

[id number] Memoria



        15:00:00 04:59:59                         r       Nephrology 35      l



                                                        Rudy Hines

n

 

        2019-10-10 2019-10-11 Outpatient                 nullFlavo [facility]    [id number]

[id number] Memoria



        15:00:00 04:59:59                         r       Nephrology 35      l



                                                        Rudy Hines

n

 

        2019-10-10 2019-10-10 Outpatient         [place],  MG    [facility]    [id number]

365 



        10:00:00 23:59:59                 Charisse N                 35      

 

        2019-10-10 2019-10-10 Outpatient                 [facility]    IE    [id number]

365 [facility]oria



        12:00:00 12:00:00                                         37      dennis Mart

 

        2019-10-10 2019-10-10 Outpatient                 [facility]    IE    [id number]

365 [facility]oria



        12:00:00 12:00:00                                         37      dennis Mart

 

        2019-10-10 2019-10-10 Outpatient                 [facility]    IE    [id number]

365 Memoria



        10:00:00 10:00:00                                         35      dennis Mart

 

        2019 Phone                   nullFlavo [facility] 3467

[id number] Memoria



        15:15:06 04:59:59 Message                 r       Medicine 04      dennis Hines

n

 

        2019 Phone                   nullFlavo [facility] 3467

[id number] Memoria



        15:15:06 04:59:59 Message                 r       Medicine 04      dennis Hines

n

 

        2019 Phone                   nullFlavo [facility]    [id number]

55 Memoria



        15:10:51 04:59:59 Message                 r       Nephrology 03      dennis Hines

n

 

        2019 Phone                   nullFlavo [facility]    [id number]

55 Memoria



        15:10:51 04:59:59 Message                 r       Nephrology 03      dennis Hines

n

 

        2019 Outpatient                 MG    [facility]    [id number]

355 



        10:15:06 23:59:59                                         04      

 

        2019 Outpatient                 [facility]    [id number]

355 



        10:10:51 23:59:59                                         03      

 

        2019 Ambulatory                 nullFlavo [facility]    [id number]

[id number] Memoria



        20:00:00 20:00:00 Pre-Reg                 r       Nephrology 34      l



                                                        Rudy Hines

n

 

        2019 Ambulatory                 nullFlavo [facility]    [id number]

[id number] Memoria



        20:00:00 20:00:00 Pre-Reg                 r       Nephrology 34      dennis martinez

 

        2019 Outpatient                 [facility]    [id number]

365 Memoria



        15:00:00 15:00:00                                         34      dennis Mart

 

        2019 Outpatient         [facility]    346

[id number] 



        15:00:00 15:00:00                 Rosy,                   Tate CLARK                         

 

        2019 Between                 nullFlavo MG    [id number]

75 Memoria



        20:15:16 20:15:16 Visit                   r       Nephrology 34      dennis Mart

 

        2019 Between                 nullFlavo MG    [id number]

75 Memoria



        20:15:16 20:15:16 Visit                   r       Nephrology 34      dennis Mart

 

        2019 Outpatient                 [facility]    [id number]

375 



        15:15:16 15:15:16                                         34      

 

        2019 Phone                   nullFlavo MG    [id number]

55 Memoria



        15:29:54 04:59:59 Message                 r       Nephrology 02      dennis Mart

 

        2019 Phone                   nullFlavo MG    [id number]

55 Memoria



        15:29:54 04:59:59 Message                 r       Nephrology 02      dennis Mart

 

        2019 Outpatient                 [facility]    [id number]

355 



        10:29:54 23:59:59                                         02      

 

        2019 Ambulatory                 nullFlavo MG    [id number]

[id number] Memoria



        16:30:00 16:30:00 Pre-Reg                 r       Nephrology 29      l



                                                        Rudy martinez

 

        2019 Ambulatory                 nullFlavo MG    [id number]

[id number] Memoria



        16:30:00 16:30:00 Pre-Reg                 r       Nephrology 29      l



                                                        Rudy martinez

 

        2019 Ambulatory                 nullFlavo MG    [id number]

[id number] Memoria



        16:15:00 16:15:00 Pre-Reg                 r       Nephrology 30      l



                                                        Rudy martinez

 

        2019 Ambulatory                 nullFlavo MG    [id number]

[id number] Memoria



        16:15:00 16:15:00 Pre-Reg                 r       Nephrology 30      dennis martinez

 

        2019 Ambulatory                 nullFlavo [facility] 3

[id number] Memoria



        15:15:00 15:15:00 Pre-Reg                 r       Medicine 31      dennis martinez

 

        2019 Ambulatory                 nullFlavo [facility] 3

[id number] Memoria



        15:15:00 15:15:00 Pre-Reg                 r       Medicine 31      l



                                                        Rudy Hines

n

 

        2019 Outpatient                 [facility]    [facility]    [id number]

365 Memoria



        11:30:00 11:30:00                                         29      dennis



                                                                        [place]

 

        2019 Outpatient         [place]    MG    [id number]

[id number] 



        11:30:00 11:30:00                 Rosy                   Marco A      



                                        Gabriela CLARK                         

 

        2019 Outpatient                 [facility]    IE    [id number]

365 Memoria



        11:15:00 11:15:00                                         30      dennis



                                                                        [place]

 

        2019 Outpatient         [place] MG    MG    346

[id number] 



        11:15:00 11:15:00                 RosyJamil      



                                        Gabriela EDUARDO                         

 

        2019 Outpatient                 [facility]    [facility]    [id number]

365 Memoria



        10:15:00 10:15:00                                         31      dennis



                                                                        [place]

 

        2019 Outpatient         [place],  MG    MG    [id number]

365 



        10:15:00 10:15:00                 Charisse MARTINEZ                 31      

 

        2019 Inpatient PABLITO PARRPABLITO,   [facility]     TELE    [id number]

69 [place]



        10:05:00 17:55:00                 GOPAL                         [facility]

[facility]

 

        2019 Outpatient PABLITO ADEN,   [facility]     TELE    [id number]

427 [place]



        21:36:00 19:00:00                 GOPAL                         [facility]

[facility]

 

        2019 Outpatient                 nullFlavo [facility] 346

[id number] Memoria



        20:40:00 04:59:59                         r       Care    33      l



                                                        Candace         [place]

 

        2019 Outpatient                 nullFlavo [facility] 346

[id number] Memoria



        20:40:00 04:59:59                         r       Care    33      dennis Maria         [place]

 

        2019 [facility]         [facility]    [id number]

365 



        15:40:00 23:59:59                 Mitch                 Yousif louise                           

 

        2019 Outpatient                 IE    IE    [id number]

365 Memoria



        15:40:00 15:40:00                                         33      dennis



                                                                        [place]

 

        2019 Outpatient                 nullFlavo MG Family 3

[id number] Memoria



        15:15:00 04:59:59                         r       Medicine 32      dennis Grant         Donny martinez

 

        2019 Outpatient                 nullFlavo MG Family 3

[id number] Memoria



        15:15:00 04:59:59                         r       Medicine 32      dennis Grant         Donny martinez

 

        2019 Outpatient         [facility]    346

[id number] 



        10:15:00 23:59:59                 Kristie Gallegos                         

 

        2019 Outpatient                 IE    IE    [id number]

365 [facility]



        10:15:00 10:15:00                                         32      dennis



                                                                        [place]

 

        2019 Between                 nullFlavo MG Family 3467

[id number] Memoria



        19:00:16 19:00:16 Visit                   r       Medicine 29      dennis Grant         Donny

michelle

 

        2019 Between                 nullFlavo [facility] Family 3467

[id number] Memoria



        19:00:16 19:00:16 Visit                   r       Medicine 29      l



                                                        Grant         Donyn martinez

 

        2019 [facility]    [id number]

375 



        14:00:16 14:00:16                                         29      

 

        2019 2019-03-15 Between                 nullFlavo MG    [id number]

75 Memoria



        15:02:37 15:02:37 Visit                   r       Nephrology 27      l



                                                        Grant         Donny martinez

 

        2019 2019-03-15 Between                 nullFlavo MG    [id number]

75 Memoria



        15:02:37 15:02:37 Visit                   r       Nephrology 27      l



                                                        Rudy martinez

 

        2019 2019-03-15 Out[facility]    [id number]

375 



        10:02:37 10:02:37                                         27      

 

        2019 2019-03-15 Outpatient                 nullFlavo MG    [id number]

[id number] Memoria



        18:45:00 04:59:59                         r       Nephrology 28      dennis Hines

n

 

        2019 2019-03-15 Outpatient                 nullFlavo MG    [id number]

[id number] Memoria



        18:45:00 04:59:59                         r       Nephrology 28      dennis Hines

n

 

        2019 2019-03-15 Outpatient                 nullFlavo MG Family 3

[id number] Memoria



        14:15:00 04:59:59                         r       Medicine 22      dennis Hines

n

 

        2019 2019-03-15 Outpatient                 nullFlavo MG Family 3

[id number] Memoria



        14:15:00 04:59:59                         r       Medicine 22      dennis Hines

n

 

        2019 Outpatient         [place] MG    [facility]    346

[id number] 



        13:45:00 23:59:59                 Husam Gallegos                         

 

        2019 Outpatient         [place],  [facility]    [id number]

365 



        09:15:00 23:59:59                 Charisse MARTINEZ                 22      

 

        2019 Outpatient                 [facility]    IE    [id number]

365 [facility]



        13:45:00 13:45:00                                         28      dennis Mart

 

        2019 Outpatient                 [facility]    [facility]    [id number]

365 Memoria



        09:15:00 09:15:00                                         22      dennis Mart

 

        2019 Between                 nullFlavo MG    [id number]

75 Memoria



        23:59:47 23:59:47 Visit                   r       Nephrology 26      dennis Hines

michelle

 

        2019 Between                 nullFlavo MG    [id number]

75 Memoria



        23:59:47 23:59:47 Visit                   r       Nephrology 26      dennis Hines

michelle

 

        2019 Outpatient                 MG    MG    [id number]

375 



        18:59:47 18:59:47                                         26      

 

        2019 Between                 nullFlavo MG    [id number]

75 Memoria



        22:00:33 22:00:33 Visit                   r       Nephrology 24      dennis Hines

michelle

 

        2019 Between                 nullFlavo MG    [id number]

75 Memoria



        22:00:33 22:00:33 Visit                   r       Nephrology 24      dennis Hines

n

 

        2019 Outpatient                 [facility]    MG    [id number]

375 



        16:00:33 16:00:33                                         24      

 

        2019 Between                 nullFlavo [facility]    [id number]

75 Memoria



        04:48:44 04:48:44 Visit                   r       Nephrology 23      dennis martinez

 

        2019 Between                 nullFlavo [facility]    [id number]

75 Memoria



        04:48:44 04:48:44 Visit                   r       Nephrology 23      dennis Hines

n

 

        2019 Outpatient                 [facility]    MG    [id number]

375 



        22:48:44 22:48:44                                         23      

 

        2019 Ambulatory                 nullFlavo MG    [id number]

[id number] Memoria



        20:30:00 20:30:00 Pre-Reg                 r       Nephrology 27      dennis martinez

 

        2019 Ambulatory                 nullFlavo MG    [id number]

[id number] Memoria



        20:30:00 20:30:00 Pre-Reg                 r       Nephrology 27      dennis Hines

n

 

        2019 Ambulatory                 nullFlavo MG    [id number]

[id number] Memoria



        18:40:00 18:40:00 Pre-Reg                 r       Nephrology 24      dennis martinez

 

        2019 Ambulatory                 nullFlavo MG    [id number]

[id number] Memoria



        18:40:00 18:40:00 Pre-Reg                 r       Nephrology 24      dennis martinez

 

        2019 Outpatient                 [facility]    IE    [id number]

365 [facility]



        14:30:00 14:30:00                                         27      dennis Mart

 

        2019 Outpatient         [place]    [facility]    346

[id number] 



        14:30:00 14:30:00                 Zuleyma Gallegos                         

 

        2019 Outpatient         [place]    [facility]    346

[id number] 



        14:30:00 14:30:00                 Zuleyma Gallegos                         

 

        2019 Outpatient                 [facility]    IE    [id number]

365 [facility]



        12:40:00 12:40:00                                         24      dennis Mart

 

        2019 Outpatient         [facility]    346

[id number] 



        12:40:00 12:40:00                 Michael Gallegos                         

 

        2019 Outpatient         [facility]    346

[id number] 



        12:40:00 12:40:00                 Michael Gallegos                         

 

        2019 Ambulatory                 nullFlavo MG    [id number]

[id number] Memoria



        15:30:00 15:30:00 Pre-Reg                 r       Internal 25      l



                                                        Medicine         [place]



                                                        [place]         

 

        2019 Ambulatory                 nullFlavo [facility]    [id number]

[id number] Memoria



        15:30:00 15:30:00 Pre-Reg                 r       Internal 25      l



                                                        Medicine         [place]



                                                        [place]         

 

        2019 Outpatient                 [facility]    [id number]

365 [facility]



        09:30:00 09:30:00                                         25      dennis



                                                                        [place]

 

        2019 Outpatient                 [facility]    [id number]

365 



        09:30:00 09:30:00                                         25      

 

        2018 2018-10-20 Ambulatory                 nullFlavo MG    [id number]

[id number] Memoria



        12:45:00 12:45:00 Pre-Reg                 r       Internal 23      l



                                                        Medicine         [place]



                                                        [place]         

 

        2018 2018-10-20 Ambulatory                 nullFlavo [facility]    [id number]

[id number] Memoria



        12:45:00 12:45:00 Pre-Reg                 r       Internal 23      l



                                                        Medicine         [place]



                                                        [place]         

 

        2018 2018-10-20 Outpatient                 MG    [facility]    [id number]

365 



        07:45:00 07:45:00                                         23      

 

        2018 Outpatient                 nullFlavo MG    [id number]

[id number] Memoria



        19:15:00 04:59:59                         r       Nephrology 26      l



                                                        Rudy         Donny martinez

 

        2018 Outpatient                 nullFlavo MG    [id number]

[id number] Memoria



        19:15:00 04:59:59                         r       Nephrology 26      l



                                                        Rudy Hines

michelle

 

        2018 Outpatient                 nullFlavo MG    [id number]

[id number] Memoria



        18:00:00 04:59:59                         r       Nephrology 21      l



                                                        Rudy         Donny martinez

 

        2018 Outpatient                 nullFlavo [facility]    [id number]

[id number] Memoria



        18:00:00 04:59:59                         r       Nephrology 21      l



                                                        Rudy Hines

michelle

 

        2018 [facility]    346

[id number] 



        14:15:00 23:59:59                 Daca,                   26      



                                        Gabriela CLARK                         

 

        2018 [facility]

[id number] 



        13:00:00 23:59:59                 Daca                   21      



                                        Gabriela CLARK                         

 

        2018 Outpatient                 IE    IE    [id number]

365 [facility]



        14:15:00 14:15:00                                         26      dennis



                                                                        Barron

 

        2018 Outpatient                 [facility]    IE    [id number]

[facility]



        13:00:00 13:00:00                                         21      dennis



                                                                        Barron

 

        2018 Outpatient                 [facility]    IE    [id number]

[facility]



        07:45:00 07:45:00                                         23      dennis



                                                                        Barron

 

        2018 Outpatient                 [facility]

[id number] [facility]



        18:30:00 04:59:59                         r       Medicine 20      l



                                                        Rudy Hines

michelle

 

        2018 Outpatient                 [facility]

[id number] [facility]oria



        18:30:00 04:59:59                         r       Medicine 20      dennis Hines

n

 

        2018 Outpatient         C[place]ne,  MG    MG    [id number]

365 



        13:30:00 23:59:59                 Cahrisse MARTINEZ                 20      

 

        2018 Outpatient                 IE    IE    [id number]

365 [facility]



        13:30:00 13:30:00                                         20      dennis



                                                                        Barron

 

        2018 Outpatient                 nullFlavo MG    [id number]

[id number] [facility]



        16:00:00 04:59:59                         r       Nephrology 19      l



                                                        Rudy Hines

n

 

        2018 Outpatient                 nullFlavo MG    [id number]

[id number] [facility]oria



        16:00:00 04:59:59                         r       Nephrology 19      l



                                                        Rudy Hines

michelle

 

        2018 [facility]

[id number] 



        11:00:00 23:59:59                 Daca,                   19      



                                        Gabriela J                         

 

        2018 Outpatient         [facility]- [facility]    [facility]    346

[id number] 



        11:00:00 23:59:59                 Toney Gallegos                         

 

        2018 Outpatient                 [facility]    [facility]    [id number]

365 [facility]



        11:00:00 11:00:00                                         19      dennis Mart

 

        2018 Outpatient                 [facility]

[id number] [facility]



        15:00:00 04:59:59                         r       Medicine 18      dennis Hines

michelle

 

        2018 Outpatient                 [facility]

[id number] Memoria



        15:00:00 04:59:59                         r       Medicine 18      dennis Hines

michelle

 

        2018 Outpatient         [place],  [facility]    MG    [id number]

365 



        10:00:00 23:59:59                 Charisse N                 18      

 

        2018 Outpatient         [place],  [facility]    MG    [id number]

365 



        10:00:00 23:59:59                 Charisse N                 18      

 

        2018 Outpatient                 [facility]    [facility]    [id number]

[facility]



        10:00:00 10:00:00                                         18      dennis Mart

 

        2017 Outpatient                 [facility]    [facility]    [id number]

[facility]



        10:40:00 10:40:00                                         16      dennis Mart

 

        2017 Outpatient                 [facility]    [facility]    [id number]

[facility]



        10:40:00 10:40:00                                         16      dennis Mart

 

        2017 Outpatient                 [facility]    [facility]    [id number]

[facility]



        11:30:00 11:30:00                                         17      dennis Mart

 

        2017 Outpatient                 [facility]    [facility]    [id number]

[facility]



        11:30:00 11:30:00                                         17      dennis Mart

 

        2017 Outpatient                 [facility]    [facility]    [id number]

[facility]



        11:40:00 11:40:00                                         11      dennis Mart

 

        2017 Outpatient                 [facility]    [facility]    [id number]

365 [facility]



        11:40:00 11:40:00                                         11      ednnis Mart

 

        2017 Outpatient                 [facility]    [facility]    [id number]

[facility]



        14:30:00 14:30:00                                         15      dennis Mart

 

        2017 Outpatient                 [facility]    [facility]    [id number]

[facility]



        14:30:00 14:30:00                                         15      dennis Mart

 

        2017 Outpatient                 [facility]    [facility]    [id number]

365 [facility]



        10:00:00 10:00:00                                         08      dennis Mart

 

        2017 Outpatient                 [facility]    [facility]    [id number]

[facility]



        10:00:00 10:00:00                                         08      dennis Mart

 

        2017 Bedded                  nullFlavo [facility] [id number]

375 [facility]



        11:48:35 14:30:00 Outpatient                 r       Barron 13      dennis



                                                        [place]



 

        2017 Bedded                  nullFlavo [facility] [id number]

375 [facility]



        11:48:35 14:30:00 Outpatient                 xuan Mart 13      l



                                                        [place]



 

        2017 Outpatient         Cunningham, [facility]    [facility]    [id number]

[id number] 



        05:48:35 08:30:00                 Randy LEVIN                 13      

 

        2017 Outpatient                 [facility]    [facility]    [id number]

[facility]



        13:15:00 13:15:00                                         14      dennis Mart

 

        2017 Outpatient                 [facility]    [facility]    [id number]

[facility]



        13:15:00 13:15:00                                         14      dennis Mart

 

        2016 Outpatient                 [facility]    [facility]    [id number]

[facility]



        09:30:00 09:30:00                                         12      dennis Mart

 

        2016 Outpatient                 [facility]    [facility]    [id number]

[facility]



        09:30:00 09:30:00                                         13      dennis Mart

 

        2016 Outpatient                 [facility]    [facility]    [id number]

[facility]



        09:30:00 09:30:00                                         12      dennis Mart

 

        2016 Outpatient                 [facility]    [facility]    [id number]

[facility]



        09:30:00 09:30:00                                         13      dennis Mart

 

        2016 Outpatient                 [facility]    [facility]    [id number]

[facility]



        10:20:00 10:20:00                                         09      dennis Mart

 

        2016 Outpatient                 [facility]    [id number]

[facility]



        10:20:00 10:20:00                                         09      dennis



                                                                        Barron

 

        2016 Outpatient                 [facility]    [id number]

[facility]



        13:00:00 13:00:00                                         04      l



                                                                        Barron

 

        2016 Outpatient                 [facility]    [id number]

365 [facility]



        13:00:00 13:00:00                                         04      l



                                                                        Barron

 

        2016 Outpatient                 [facility]    [id number]

365 [facility]



        11:15:00 11:15:00                                         06      dennis



                                                                        Barron

 

        2016 Outpatient                 [facility]    [id number]

365 [facility]



        11:15:00 11:15:00                                         06      dennis



                                                                        Barron

 

        2016 OP Therapy                 nullFlavo [facility]     [id number]

[id number] [facility]oria



        13:00:00 04:59:00 Patients                 xuan Rajput 03      dennis



                                                        Jl           Barron

 

        2016 OP Therapy                 nullFlavo [facility]     [id number]

[id number] Memoria



        13:00:00 04:59:00 Patients                 xuan Rajput 03      dennis



                                                        Jl           Barron

 

        2016 Outpatient         Lei,   2.16.840. 2.16.840.1. 3

[id number] 



        08:00:00 23:59:00                 Yoan M [id number] 03    

  



                                                3.615.55 5.55            

 

        2016 OP Therapy                 nullFlavo [facility]     [id number]

[id number] [facility]



        17:39:00 04:59:00 Patients                 xuan Rajput 02      dennis



                                                        Jl Rosarioann

 

        2016 OP Therapy                 nullFlavo [facility]     [id number]

[id number] [facility]oria



        17:39:00 04:59:00 Patients                 xuan Rajput 02      dennis



                                                        Jl Rosarioann

 

        2016 Outpatient         Lei,   2.16.840. 2.16.840.1. 3

[id number] 



        12:39:00 23:59:00                 Yoan M [id number]. [id number] 02    

  



                                                3.615.55 5.55            

 

        2016 OP Therapy                 nullFlavo [facility] 346

[id number] Memoria



        19:00:00 04:59:00 Patients                 r       [place]   01      [facility]

 

        2016 OP Therapy                 nullFlavo [facility] [place] 346

[id number] Memoria



        19:00:00 04:59:00 Patients                 r       [place]   01      [facility]

 

        2016 Outpatient         Lei,   2.16.840. 2.16.840.1. 3

[id number] 



        14:00:00 23:59:00                 Yoan RICHARDS [id number]. [id number] 01    

  



                                                3.615.69 5.69            

 

        2016 Outpt Diag                 nullFlavo [facility]    [id number]

[id number] Memoria



        16:14:00 04:59:00 Services                 r       Outpatient 04      [facility]         

 

        2016 Outpt Diag                 nullFlavo [facility]    [id number]

24205 Memoria



        16:14:00 04:59:00 Services                 r       Outpatient 04      [facility]         

 

        2016 Outpatient         [address]    346

[id number] 



        11:14:00 23:59:00                 Rosy                   Roland Ochoa EDUARDO                         

 

        2016 OP Therapy                 nullFlavo [facility] [place] 346

[id number] Memoria



        19:00:00 04:59:00 Patients                 r       [place]   00      [facility]

 

        2016 OP Therapy                 nullFlavo [facility] [place] 346

[id number] Memoria



        19:00:00 04:59:00 Patients                 r       [place]   00      [facility]

 

        2016 Outpatient         Lei,   2.16.840. 2.16.840.1. 3

[id number] 



        14:00:00 23:59:00                 Yoan RICHARDS [id number]. [id number] 00    

  



                                                3.615.69 5.69            

 

        2016 Outpatient                 [facility]    [id number]

365 [facility]



        10:20:00 10:20:00                                               [facility]

 

        2016 [id number]

[facility]



        10:20:00 10:20:00                                               [facility]

 

        2016-05-10 2016 Outpt Diag                 nullFlavo [facility]    [id number]

[id number] Memoria



        14:59:00 04:59:00 Services                 r       Outpatient 03      l



                                                        Imaging         Barron Zarate            

 

        2016-05-10 2016 Outpt Diag                 nullFlavo [facility]    [id number]

[id number] Memoria



        14:59:00 04:59:00 Services                 r       Outpatient 03      l



                                                        Imaging         Barron Zarate            

 

        2016-05-10 2016-05-10 Outpatient         Abbi Somers28    28    346

[id number] 



        09:59:00 23:59:00                 Atkins                  2016 Outpt Diag                 nullFlavo [facility]    [id number]

72435 Memoria



        18:11:00 04:59:00 Services                 r       Outpatient 01      l



                                                        Imaging         Barron Pinzon         

 

        2016 Outpt Diag                 nullFlavo [facility]    [id number]

97979 Memoria



        18:11:00 04:59:00 Services                 r       Outpatient 01      l



                                                        Imaging         [place]         

 

        2016 Outpatient         Abbi Somers 29    [facility]    346

[id number] 



        13:11:00 23:59:00                 At[place]                  01      

 

        2016-03-10 2016-03-10 Outpatient                 nullFlavo [facility]    [id number]

   Memoria



        12:50:31 19:25:00                         r                       l



                                                                        Barron

 

        2016-03-10 2016-03-10 Outpatient                 2.16.840. 2.16.840.1. 3

[id number]   Memoria



        12:50:31 19:25:00                         [id number]         

l



                                                3.2081.20 81.2000         Donny

n



                                                00                      [facility]



                                                                        l



                                                                        Hospita



                                                                        [facility]

 

        2016-03-10 2016-03-10 Outpatient                 nullFlavo [facility]    [id number]

   Memoria



        12:50:31 19:25:00                         r                       l



                                                                        Barron

 

        2016 2016-02-10 Outpt Diag                 nullFlavo [facility]    [id number]

[id number] Memoria



        12:58:00 05:59:00 Services                 r       Outpatient 00      l



                                                        Imaging         [place]ron [place]         

 

        2016 2016-02-10 Outpt Diag                 nullFlavo [facility]    [id number]

[id number] Memoria



        12:58:00 05:59:00 Services                 r       Outpatient 00      l



                                                        Imaging         [place]ron [place]         

 

        2016 Outpatient         Somers, Abbi 29    [facility]    346

[id number] 



        06:58:00 23:59:00                 [place]                  00      

 

        2016 Outpatient                 [facility]    IE    [id number]

365 [facility]



        10:15:00 10:15:00                                         02      [facility]

 

        2016 Outpatient                 [facility]    IE    [id number]

365 [facility]



        10:15:00 10:15:00                                         02      dennis Rosarioann

 

        2015 [id number]

[facility]



        11:30:00 11:30:00                                         00      dennis Rosarioann

 

        2015 [facility]    [id number]

365 [facility]



        11:30:00 11:30:00                                         00      [facility]

 

        2014 Outpatient                 nullFlavo [facility] 3467

2273_3 Memoria



        01:16:00 05:59:00                         r       Barron [id number] 



                                                        [address]

 

        2014 Outpatient                 nullFlavo [facility] 3467

2273_3 Memoria



        01:16:00 05:59:00                         r       Barron [id number] 



                                                        [address]

 

        2014 Outpatient         [facility] 2.16.840. 2.16.840.

1. [id number] 



        19:16:00 23:59:00                 , Vignesh [id number]. [id number]        

 



                                        Caitlin  3.615.0.1 5.0.100         



                                                00                      

 

        2014 Outpatient                 nullFlavo       [id number]

98668 Memoria



        19:16:00 19:16:00                         r       [place] 01      [facility]

 

        2014 Outpatient                 nullFlavo       [id number]

[id number] Memoria



        19:16:00 19:16:00                         r       [place] 01      [facility]

 

        2014 Outpatient                 nullFlavo       [id number]

[id number] Memoria



        19:43:00 19:43:00                         r       [place]      



                                                                        Barron

 

        2014 Outpatient                 nullFlavo       [id number]

[id number] Memoria



        19:43:00 19:43:00                         r       [place]      



                                                                        [place]







Results







           Test Description Test Time  Test Comments Results    Result     Sourc

e



                                                       Comments   

 

           PET/CT, CARDIAC 2022 Reason for ************************       

     



           PERF REST AND 16:05:00   exam:->angina ************************      

      



           STRESS                           ************[facility]            



                                            SUZI SEARS : 1955            



                                            Sex:                  



                                            F***********************            



                                            ************************            



                                            *************FINAL            



                                            REPORT PATIENT ID:            



                                            [id number] PROCEDURE:            



                                            MYOCARDIAL PERFUSION            



                                            PET/CT IMAGING            



                                            (Rest/Stress)CPT CODE:            



                                            19220 INDICATION:            



                                            Evaluation for chest            



                                            pain CARDIOVASCULAR            



                                            PROFILE:CAD History:            



                                            NoneSymptoms: Chest            



                                            painRisk Factors: Atrial            



                                            fibrillation, ESRDBMI:            



                                            34 STRESS             



                                            PROTOCOL:Pharmacologic            



                                            stress was achieved with            



                                            a 10-second intravenous            



                                            infusion of regadenoson            



                                            0.4 mg. The            



                                            radiopharmaceutical was            



                                            administered 30 seconds            



                                            after the start of the            



                                            regadenoson infusion.            



                                            IMAGING PROTOCOL:Limited            



                                            low-dose CT imaging was            



                                            performed for            



                                            attenuation correction.            



                                            39.9 mCi of Rb-82            



                                            chloride was injected            



                                            intravenously at rest,            



                                            and gated PET images            



                                            were obtained. Then,            



                                            39.9 mCi of Rb-82            



                                            chloride was injected            



                                            intravenously at peak            



                                            stress, and gated PET            



                                            images were obtained.            



                                            Image quality is good.            



                                            REST FINDINGS:HR:            



                                            79/minBP: 123/65            



                                            mmHgPrelim. EKG: Atrial            



                                            fibrillation with            



                                            incomplete RBBB and            



                                            nonspecific ST            



                                            changes.Perfusion:            



                                            Normal.Wall Motion:            



                                            Normal (LVEF >70%).LV            



                                            Volume: Normal. STRESS            



                                            FINDINGS:HR: 96/min (62%            



                                            of MPHR)BP: 116/70            



                                            mmHgPrelim. EKG: No            



                                            ischemic              



                                            changes.Symptoms:            



                                            Dyspnea (treatment not            



                                            required).Perfusion:            



                                            Normal.Wall Motion:            



                                            Normal (LVEF >70%).LV            



                                            Volume: Not            



                                            significantly changed            



                                            from rest. IMPRESSION:1.            



                                            Normal study.2. Normal            



                                            myocardial perfusion.3.            



                                            Normal resting LVEF,            



                                            which does not            



                                            deteriorate with            



                                            pharmacologic stress.4.            



                                            Normal extracardiac            



                                            tracer distribution.5.            



                                            There is no prior study            



                                            for comparison. Signed:            



                                            Reggie Gudino MDReport            



                                            Verified Date/Time:            



                                            2022 16:05:42            



                                            Electronically signed            



                                            by: REGGIE GUDINO MD on            



                                            2022 04:05 PM            









                    BASIC METABOLIC PANEL 2022 06:01:00 









                      Test Item  Value      Reference Range Interpretation Comme

nts









             SODIUM (BEAKER) (test 140 meq/L    136-145                   



             code = 381)                                         

 

             POTASSIUM (BEAKER) 4.8 meq/L    3.5-5.1                   Specimen 

slightly hemolyzed



             (test code = 379)                                        

 

             CHLORIDE (BEAKER) (test 107 meq/L                        



             code = 382)                                         

 

             CO2 (BEAKER) (test code 21 meq/L     22-29        L            



             = 355)                                              

 

             BLOOD UREA NITROGEN 22 mg/dL     7-21         H            



             (BEAKER) (test code =                                        



             354)                                                

 

             CREATININE (BEAKER) 5.15 mg/dL   0.57-1.25    H            Specimen

 slightly hemolyzed



             (test code = 358)                                        

 

             GLUCOSE RANDOM (BEAKER) 97 mg/dL                         



             (test code = 652)                                        

 

             CALCIUM (BEAKER) (test 9.1 mg/dL    8.4-10.2                  



             code = 697)                                         

 

             EGFR (BEAKER) (test 9 mL/min/1.73 sq m                            I

nterpretation of eGFR values



             code = 1092)                                        Stage Descripti

on Result G1



                                                                 Normal or high 

>=90 G2 Mildly



                                                                 decreased 60-89

 G3a Mildly to



                                                                 moderately 45-5

9 G3b Moderately



                                                                 to severely 30-

44 G4 Severly



                                                                 decreased 15-29

 G5 Kidney



                                                                 failure <15Repo

rted eGFR is



                                                                 based on the CK

D-EPI 



                                                                 equation that d

oes not use a



                                                                 race coefficien

tEstimated GFR is



                                                                 not as accurate

 as Creatinine



                                                                 Clearance in pr

edicting



                                                                 glomerular filt

ration rate.



                                                                 Estimated GFR i

s not applicable



                                                                 for dialysis seng miller



 ID - MARCOSpecimen slightly [id number] 05:55:08





             Test Item    Value        Reference Range Interpretation Comments

 

             MAGNESIUM (BEAKER) 1.9 mg/dL    1.6-2.6                   Specimen 

slightly



             (test code = 627)                                        hemolyzed



 ID - [id number] 05:55:08





             Test Item    Value        Reference Range Interpretation Comments

 

             PHOSPHORUS (BEAKER) 3.4 mg/dL    2.3-4.7                   Specimen

 slightly



             (test code = 604)                                        hemolyzed



 ID - MARCOCBC W/PLT COUNT &amp; AUTO [id number] 04:43:14





             Test Item    Value        Reference Range Interpretation Comments

 

             WHITE BLOOD CELL COUNT 6.7 K/ L     3.5-10.5                  



             (BEAKER) (test code =                                        



             775)                                                

 

             RED BLOOD CELL COUNT 2.94 M/ L    3.93-5.22    L            



             (BEAKER) (test code =                                        



             761)                                                

 

             HEMOGLOBIN (BEAKER) 9.1 GM/DL    11.2-15.7    L            



             (test code = 410)                                        

 

             HEMATOCRIT (BEAKER) 30.4 %       34.1-44.9    L            



             (test code = 411)                                        

 

             MEAN CORPUSCULAR 103 fL       79-95        H            Discordant 

MCV



             VOLUME (BEAKER) (test                                        result

s compared to



             code = 753)                                         previous result

s;



                                                                 clinical correl

ation



                                                                 required.

 

             MEAN CORPUSCULAR 31.0 pg      25.6-32.2                 



             HEMOGLOBIN (BEAKER)                                        



             (test code = 751)                                        

 

             MEAN CORPUSCULAR 29.9 GM/DL   32.2-35.5    L            



             HEMOGLOBIN CONC                                        



             (BEAKER) (test code =                                        



             752)                                                

 

             RED CELL DISTRIBUTION 14.2 %       11.7-14.4                 



             WIDTH (BEAKER) (test                                        



             code = 412)                                         

 

             PLATELET COUNT 84 K/CU MM   150-450      L            



             (BEAKER) (test code =                                        



             756)                                                

 

             MEAN PLATELET VOLUME 10.3 fL      9.4-12.3                  



             (BEAKER) (test code =                                        



             754)                                                

 

             NUCLEATED RED BLOOD 0 /100 WBC   0-0                       



             CELLS (BEAKER) (test                                        



             code = 413)                                         

 

             NEUTROPHILS RELATIVE 75 %                                   



             PERCENT (BEAKER) (test                                        



             code = 429)                                         

 

             LYMPHOCYTES RELATIVE 16 %                                   



             PERCENT (BEAKER) (test                                        



             code = 430)                                         

 

             MONOCYTES RELATIVE 7 %                                    



             PERCENT (BEAKER) (test                                        



             code = 431)                                         

 

             EOSINOPHILS RELATIVE 2 %                                    



             PERCENT (BEAKER) (test                                        



             code = 432)                                         

 

             BASOPHILS RELATIVE 0 %                                    



             PERCENT (BEAKER) (test                                        



             code = 437)                                         

 

             NEUTROPHILS ABSOLUTE 5.05 K/ L    1.56-6.13                 



             COUNT (BEAKER) (test                                        



             code = 670)                                         

 

             LYMPHOCYTES ABSOLUTE 1.05 K/ L    1.18-3.74    L            



             COUNT (BEAKER) (test                                        



             code = 414)                                         

 

             MONOCYTES ABSOLUTE 0.44 K/ L    0.24-0.36    H            



             COUNT (BEAKER) (test                                        



             code = 415)                                         

 

             EOSINOPHILS ABSOLUTE 0.10 K/ L    0.04-0.36                 



             COUNT (BEAKER) (test                                        



             code = 416)                                         

 

             BASOPHILS ABSOLUTE 0.02 K/ L    0.01-0.08                 



             COUNT (BEAKER) (test                                        



             code = 417)                                         

 

             IMMATURE     0.60 %       0.00-1.00                 



             GRANULOCYTES-RELATIVE                                        



             PERCENT (BEAKER) (test                                        



             code = 2801)                                        



PROTEIN ELECTROPHORESIS, SERUM WITH REFLEX TO [id number] 16:42:33





             Test Item    Value        Reference Range Interpretation Comments

 

             ALBUMIN FRACTION 3.4 gm/dL    3.5-5.5      L            



             (BEAKER) (test code =                                        



             405)                                                

 

             ALPHA 1 FRACTION 0.4 gm/dL    0.2-0.4                   



             (BEAKER) (test code =                                        



             389)                                                

 

             ALPHA 2 FRACTION 0.7 gm/dL    0.4-1.0                   



             (BEAKER) (test code =                                        



             390)                                                

 

             BETA FRACTION 0.6 gm/dL    0.5-1.1                   



             (BEAKER) (test code =                                        



             392)                                                

 

             GAMMA GLOBULIN 0.8 gm/dL    0.7-1.6                   



             FRACTION (BEAKER)                                        



             (test code = 391)                                        

 

             INTERPRETATION-119 Albumin decreased. This                         

  



             (BEAKER) (test code = may indicate protein                         

  



             2615)        malnutrition or a                           



                          protein losing state.                           

 

             [id number] Meredith Reyes, MD                           



             (BEAKER) (test code = (electronic signature)                       

    



             2616)                                               

 

             PROTEIN TOTAL SERUM, 6.0 gm/dL    6.0-8.3                   



             SPEP (BEAKER) (test                                        



             code = 4000)                                        



Clinical  - SFOperator ID - CAITLYN BOperator ID - ADMPERIPHERAL BLOOD SMEAR
- PATHOLOGIST [id number] 14:33:15





             Test Item    Value        Reference Range Interpretation Comments

 

             PERIPHERAL SMR REVIEW Cell counts confirmed.                       

    



             (BEAKER) (test code = There is macrocytic                          

 



             2640)        anemia. Platelets are                           



                          decreased with no                           



                          significant platelet                           



                          clumps or satellitism                           



                          identified.                            

 

             [id number] Amelia Ken                          

 



             (BEAKER) (test code = M.D.                                   



             8280)                                               



HEPATITIS B SURFACE [id number] 10:29:09





             Test Item    Value        Reference Range Interpretation Comments

 

             HEPATITIS B SURFACE ANTIGEN (2) Nonreactive  Nonreactive           

    



             (BEAKER) (test code = 5695)                                        



Specimen is considered negative for HBsAg.Transthoracic 2D echo w/ doppler 
(cw/pw/color)2022 08:20:07Ejection FractionSLEH ECHO HEARTLAB MKCKESSON 
CP[facility]CHI [facility] [id number] 05:41:18





             Test Item    Value        Reference Range Interpretation Comments

 

             HEPATITIS C ANTIBODY (BEAKER) Nonreactive  Nonreactive             

  



             (test code = 367)                                        



 ID - ROSALINOBASIC METABOLIC [id number] 05:22:16





             Test Item    Value        Reference Range Interpretation Comments

 

             SODIUM (BEAKER) 142 meq/L    136-145                   



             (test code = 381)                                        

 

             POTASSIUM    4.5 meq/L    3.5-5.1                   



             (BEAKER) (test                                        



             code = 379)                                         

 

             CHLORIDE (BEAKER) 109 meq/L           H            



             (test code = 382)                                        

 

             CO2 (BEAKER) 21 meq/L     22-29        L            



             (test code = 355)                                        

 

             BLOOD UREA   37 mg/dL     7-21         H            



             NITROGEN (BEAKER)                                        



             (test code = 354)                                        

 

             CREATININE   8.14 mg/dL   0.57-1.25    H            



             (BEAKER) (test                                        



             code = 358)                                         

 

             GLUCOSE RANDOM 99 mg/dL                         



             (BEAKER) (test                                        



             code = 652)                                         

 

             CALCIUM (BEAKER) 9.0 mg/dL    8.4-10.2                  



             (test code = 697)                                        

 

             EGFR (BEAKER) 5                                       Interpretatio

n of eGFR



             (test code = mL/min/1.73                            values Stage De

scription



             1092)        sq m                                   Result G1 Jeanette

l or high



                                                                 >=90 G2 Mildly 

decreased



                                                                 60-89 G3a Mildl

y to



                                                                 moderately 45-5

9 G3b



                                                                 Moderately to s

everely



                                                                 30-44 G4 Severl

y decreased



                                                                 15-29 G5 Kidney

 failure



                                                                 <15Reported eGF

R is based



                                                                 on the CKD-EPI 





                                                                 equation that d

oes not use



                                                                 a race



                                                                 coefficientEsti

mated GFR



                                                                 is not as accur

ate as



                                                                 Creatinine Mable acuna in



                                                                 predicting glom

erular



                                                                 filtration rate

. Estimated



                                                                 GFR is not appl

icable for



                                                                 dialysis patien

ts



 ID - [id number] 05:21:47





             Test Item    Value        Reference Range Interpretation Comments

 

             MAGNESIUM (BEAKER) (test code = 2.0 mg/dL    1.6-2.6               

    



             627)                                                



 ID - [id number] 05:21:47





             Test Item    Value        Reference Range Interpretation Comments

 

             PHOSPHORUS (BEAKER) (test code = 4.8 mg/dL    2.3-4.7      H       

     



             604)                                                



 ID - EMMANUELCBC W/PLT COUNT &amp; AUTO [id number] 05:05:48





             Test Item    Value        Reference Range Interpretation Comments

 

             WHITE BLOOD CELL COUNT (BEAKER) 7.0 K/ L     3.5-10.5              

    



             (test code = 775)                                        

 

             RED BLOOD CELL COUNT (BEAKER) 3.00 M/ L    3.93-5.22    L          

  



             (test code = 761)                                        

 

             HEMOGLOBIN (BEAKER) (test code = 9.4 GM/DL    11.2-15.7    L       

     



             410)                                                

 

             HEMATOCRIT (BEAKER) (test code = 32.0 %       34.1-44.9    L       

     



             411)                                                

 

             MEAN CORPUSCULAR VOLUME (BEAKER) 107 fL       79-95        H       

     



             (test code = 753)                                        

 

             MEAN CORPUSCULAR HEMOGLOBIN 31.3 pg      25.6-32.2                 



             (BEAKER) (test code = 751)                                        

 

             MEAN CORPUSCULAR HEMOGLOBIN CONC 29.4 GM/DL   32.2-35.5    L       

     



             (BEAKER) (test code = 752)                                        

 

             RED CELL DISTRIBUTION WIDTH 14.4 %       11.7-14.4                 



             (BEAKER) (test code = 412)                                        

 

             PLATELET COUNT (BEAKER) (test code 84 K/CU MM   150-450      L     

       



             = 756)                                              

 

             MEAN PLATELET VOLUME (BEAKER) 10.2 fL      9.4-12.3                

  



             (test code = 754)                                        

 

             NUCLEATED RED BLOOD CELLS (BEAKER) 0 /100 WBC   0-0                

       



             (test code = 413)                                        

 

             NEUTROPHILS RELATIVE PERCENT 81 %                                  

 



             (BEAKER) (test code = 429)                                        

 

             LYMPHOCYTES RELATIVE PERCENT 12 %                                  

 



             (BEAKER) (test code = 430)                                        

 

             MONOCYTES RELATIVE PERCENT 5 %                                    



             (BEAKER) (test code = 431)                                        

 

             EOSINOPHILS RELATIVE PERCENT 1 %                                   

 



             (BEAKER) (test code = 432)                                        

 

             BASOPHILS RELATIVE PERCENT 0 %                                    



             (BEAKER) (test code = 437)                                        

 

             NEUTROPHILS ABSOLUTE COUNT 5.69 K/ L    1.56-6.13                 



             (BEAKER) (test code = 670)                                        

 

             LYMPHOCYTES ABSOLUTE COUNT 0.84 K/ L    1.18-3.74    L            



             (BEAKER) (test code = 414)                                        

 

             MONOCYTES ABSOLUTE COUNT (BEAKER) 0.35 K/ L    0.24-0.36           

      



             (test code = 415)                                        

 

             EOSINOPHILS ABSOLUTE COUNT 0.08 K/ L    0.04-0.36                 



             (BEAKER) (test code = 416)                                        

 

             BASOPHILS ABSOLUTE COUNT (BEAKER) 0.02 K/ L    0.01-0.08           

      



             (test code = 417)                                        

 

             IMMATURE GRANULOCYTES-RELATIVE 0.60 %       0.00-1.00              

   



             PERCENT (BEAKER) (test code =                                      

  



             2801)                                               



BASIC METABOLIC [id number] 05:39:02





             Test Item    Value        Reference Range Interpretation Comments

 

             SODIUM (BEAKER) 141 meq/L    136-145                   



             (test code = 381)                                        

 

             POTASSIUM    5.0 meq/L    3.5-5.1                   



             (BEAKER) (test                                        



             code = 379)                                         

 

             CHLORIDE (BEAKER) 108 meq/L           H            



             (test code = 382)                                        

 

             CO2 (BEAKER) 22 meq/L     22-29                     



             (test code = 355)                                        

 

             BLOOD UREA   25 mg/dL     7-21         H            



             NITROGEN (BEAKER)                                        



             (test code = 354)                                        

 

             CREATININE   6.82 mg/dL   0.57-1.25    H            



             (BEAKER) (test                                        



             code = 358)                                         

 

             GLUCOSE RANDOM 102 mg/dL                        



             (BEAKER) (test                                        



             code = 652)                                         

 

             CALCIUM (BEAKER) 9.3 mg/dL    8.4-10.2                  



             (test code = 697)                                        

 

             EGFR (BEAKER) 6                                       Interpretatio

n of eGFR



             (test code = mL/min/1.73                            values Stage De

scription



             1092)        sq m                                   Result G1 Jeanette

l or high



                                                                 >=90 G2 Mildly 

decreased



                                                                 60-89 G3a Mildl

y to



                                                                 moderately 45-5

9 G3b



                                                                 Moderately to s

everely



                                                                 30-44 G4 Severl

y decreased



                                                                 15-29 G5 Kidney

 failure



                                                                 <15Reported eGF

R is based



                                                                 on the CKD-EPI 





                                                                 equation that d

oes not use



                                                                 a race



                                                                 coefficientEsti

mated GFR



                                                                 is not as accur

ate as



                                                                 Creatinine Mable

kalpana in



                                                                 predicting glom

erular



                                                                 filtration rate

. Estimated



                                                                 GFR is not appl

icable for



                                                                 dialysis patien

ts



 ID - CHERELLE [id number] 05:34:03





             Test Item    Value        Reference Range Interpretation Comments

 

             MAGNESIUM (BEAKER) (test code = 1.9 mg/dL    1.6-2.6               

    



             627)                                                



 JORGE LUIS VILLARREAL [id number] 05:34:03





             Test Item    Value        Reference Range Interpretation Comments

 

             PHOSPHORUS (BEAKER) (test code = 5.9 mg/dL    2.3-4.7      H       

     



             604)                                                



 JORGE LUIS VILLARREAL WCBC W/PLT COUNT &amp; AUTO [id number] 04:51:11





             Test Item    Value        Reference Range Interpretation Comments

 

             WHITE BLOOD CELL COUNT (BEAKER) 7.5 K/ L     3.5-10.5              

    



             (test code = 775)                                        

 

             RED BLOOD CELL COUNT (BEAKER) 2.88 M/ L    3.93-5.22    L          

  



             (test code = 761)                                        

 

             HEMOGLOBIN (BEAKER) (test code = 9.2 GM/DL    11.2-15.7    L       

     



             410)                                                

 

             HEMATOCRIT (BEAKER) (test code = 30.6 %       34.1-44.9    L       

     



             411)                                                

 

             MEAN CORPUSCULAR VOLUME (BEAKER) 106 fL       79-95        H       

     



             (test code = 753)                                        

 

             MEAN CORPUSCULAR HEMOGLOBIN 31.9 pg      25.6-32.2                 



             (BEAKER) (test code = 751)                                        

 

             MEAN CORPUSCULAR HEMOGLOBIN CONC 30.1 GM/DL   32.2-35.5    L       

     



             (BEAKER) (test code = 752)                                        

 

             RED CELL DISTRIBUTION WIDTH 14.3 %       11.7-14.4                 



             (BEAKER) (test code = 412)                                        

 

             PLATELET COUNT (BEAKER) (test code 87 K/CU MM   150-450      L     

       



             = 756)                                              

 

             MEAN PLATELET VOLUME (BEAKER) 10.4 fL      9.4-12.3                

  



             (test code = 754)                                        

 

             NUCLEATED RED BLOOD CELLS (BEAKER) 0 /100 WBC   0-0                

       



             (test code = 413)                                        

 

             NEUTROPHILS RELATIVE PERCENT 77 %                                  

 



             (BEAKER) (test code = 429)                                        

 

             LYMPHOCYTES RELATIVE PERCENT 15 %                                  

 



             (BEAKER) (test code = 430)                                        

 

             MONOCYTES RELATIVE PERCENT 5 %                                    



             (BEAKER) (test code = 431)                                        

 

             EOSINOPHILS RELATIVE PERCENT 1 %                                   

 



             (BEAKER) (test code = 432)                                        

 

             BASOPHILS RELATIVE PERCENT 0 %                                    



             (BEAKER) (test code = 437)                                        

 

             NEUTROPHILS ABSOLUTE COUNT 5.82 K/ L    1.56-6.13                 



             (BEAKER) (test code = 670)                                        

 

             LYMPHOCYTES ABSOLUTE COUNT 1.13 K/ L    1.18-3.74    L            



             (BEAKER) (test code = 414)                                        

 

             MONOCYTES ABSOLUTE COUNT (BEAKER) 0.39 K/ L    0.24-0.36    H      

      



             (test code = 415)                                        

 

             EOSINOPHILS ABSOLUTE COUNT 0.10 K/ L    0.04-0.36                 



             (BEAKER) (test code = 416)                                        

 

             BASOPHILS ABSOLUTE COUNT (BEAKER) 0.03 K/ L    0.01-0.08           

      



             (test code = 417)                                        

 

             IMMATURE GRANULOCYTES-RELATIVE 0.70 %       0.00-1.00              

   



             PERCENT (BIANKA) (test code =                                      

  



             2801)                                               



HIGH SENSITIVITY TROPONIN -15-06 18:47:20





             Test Item    Value        Reference Range Interpretation Comments

 

             HIGH SENSITIVITY 22 pg/ml     See_Comment  H             [Automated

 message]



             TROPONIN I (test code =                                        The 

system which



             2877474)                                            generated this 

result



                                                                 transmitted ref

erence



                                                                 range: <=17. Th

e



                                                                 reference range

 was



                                                                 not used to int

erpret



                                                                 this result as



                                                                 normal/abnormal

.



 ID - CAITLYN BThe  STAT High Sensitivity Troponin-I results 
should be used in conjunction with other diagnostic information such as ECG, 
clinical observations and information, and patient symptoms to aid in the 
diagnosis of MI.[id number] 18:35:03





             Test Item    Value        Reference Range Interpretation Comments

 

             POTASSIUM (BIANKA) (test code = 3.1 meq/L    3.5-5.1      L        

    



             379)                                                



 ID - CAITLYN BABAK, CHEST, 1 VIEW, NON [id number] 16:19:00Reason for 
exam:-&gt;pulm edemaShould this be performed at the bedside?-&gt;Yes
************************************************************[facility]  : 1955 Sex: 
F************************************************************FINAL REPORT 
PATIENT ID: [id number] RAD, CHEST, 1 VIEW, NON DEPT INDICATION: pulm edema 
COMPARISON: None FINDINGS: Portable frontal view of the chest. IMPRESSION: 
Support Lines: Dialysis catheter tip overlies the right atrium. Lungs and 
pleura: Lungs are hypoinflated but otherwise unremarkable. No significant 
pulmonary edema or pleural effusions. No significant pneumothorax. Heart and 
mediastinum: Normal contours. Additional findings: None. Signed: Olga Greer
MDReport Verified Date/Time: 2022 16:19:33 Electronically signed by: OLGA GREER MD on 2022 04:19 [id number] 16:00:35





             Test Item    Value        Reference Range Interpretation Comments

 

             THYROID STIMULATING HORMONE 1.121 uIU/mL 0.350-4.940               



             (BEAKER) (test code = 772)                                        



 ID - CAITLYN BHIGH SENSITIVITY TROPONIN -48-85 15:45:53





             Test Item    Value        Reference Range Interpretation Comments

 

             HIGH SENSITIVITY 23 pg/ml     See_Comment  H             [Automated

 message]



             TROPONIN I (test code =                                        The 

system which



             8456640)                                            generated this 

result



                                                                 transmitted ref

erence



                                                                 range: <=17. Th

e



                                                                 reference range

 was



                                                                 not used to int

erpret



                                                                 this result as



                                                                 normal/abnormal

.



 ID - CAITLYN BThe  STAT High Sensitivity Troponin-I results 
should be used in conjunction with other diagnostic information such as ECG, 
clinical observations and information, and patient symptoms to aid in the 
diagnosis of MI.B-TYPE NATRIURETIC FACTOR (BNP)2022 15:45:53





             Test Item    Value        Reference Range Interpretation Comments

 

             B-TYPE NATRIURETIC PEPTIDE (BEAKER) 395 pg/mL    0-100        H    

        



             (test code = 700)                                        



 ID - CAITLYN BCOMPREHENSIVE METABOLIC [id number] 15:42:17





             Test Item    Value        Reference Range Interpretation Comments

 

             TOTAL PROTEIN 6.2 gm/dL    6.0-8.3                   



             (BEAKER) (test                                        



             code = 770)                                         

 

             ALBUMIN (BEAKER) 3.5 g/dL     3.5-5.0                   



             (test code = 1145)                                        

 

             ALKALINE     109 U/L                          



             PHOSPHATASE                                         



             (BEAKER) (test                                        



             code = 346)                                         

 

             BILIRUBIN TOTAL 1.2 mg/dL    0.2-1.2                   



             (BEAKER) (test                                        



             code = 377)                                         

 

             SODIUM (BEAKER) 141 meq/L    136-145                   



             (test code = 381)                                        

 

             POTASSIUM (BEAKER) 3.1 meq/L    3.5-5.1      L            



             (test code = 379)                                        

 

             CHLORIDE (BEAKER) 105 meq/L                        



             (test code = 382)                                        

 

             CO2 (BEAKER) (test 25 meq/L     22-29                     



             code = 355)                                         

 

             BLOOD UREA   19 mg/dL     7-21                      



             NITROGEN (BEAKER)                                        



             (test code = 354)                                        

 

             CREATININE   5.92 mg/dL   0.57-1.25    H            



             (BEAKER) (test                                        



             code = 358)                                         

 

             GLUCOSE RANDOM 109 mg/dL           H            



             (BEAKER) (test                                        



             code = 652)                                         

 

             CALCIUM (BEAKER) 9.0 mg/dL    8.4-10.2                  



             (test code = 697)                                        

 

             AST (SGOT)   11 U/L       5-34                      



             (BEAKER) (test                                        



             code = 353)                                         

 

             ALT (SGPT)   11 U/L       6-55                      



             (BEAKER) (test                                        



             code = 347)                                         

 

             EGFR (BEAKER) 7                                        Interpretati

on of eGFR



             (test code = 1092) mL/min/1.73                            values St

age Description



                          sq m                                   Result G1 Jeanette

l or high



                                                                 >=90 G2 Mildly 

decreased



                                                                 60-89 G3a Mildl

y to



                                                                 moderately 45-5

9 G3b



                                                                 Moderately to s

everely



                                                                 30-44 G4 Severl

y decreased



                                                                 15-29 G5 Kidney

 failure



                                                                 <15Reported eGF

R is based



                                                                 on the CKD-EPI 





                                                                 equation that d

oes not use



                                                                 a race



                                                                 coefficientEsti

mated GFR



                                                                 is not as accur

ate as



                                                                 Creatinine Mable

kalpana in



                                                                 predicting glom

erular



                                                                 filtration rate

. Estimated



                                                                 GFR is not appl

icable for



                                                                 dialysis patien

ts



 ID - CAITLYN [id number] 15:39:33





             Test Item    Value        Reference Range Interpretation Comments

 

             MAGNESIUM (BEAKER) (test code = 1.9 mg/dL    1.6-2.6               

    



             627)                                                



 ID - CAITLYN [id number] 15:39:33





             Test Item    Value        Reference Range Interpretation Comments

 

             PHOSPHORUS (BEAKER) (test code = 5.0 mg/dL    2.3-4.7      H       

     



             604)                                                



 ID - CAITLYN BCBC W/PLT COUNT &amp; AUTO [id number] 15:23:08





             Test Item    Value        Reference Range Interpretation Comments

 

             WHITE BLOOD CELL COUNT (BEAKER) 5.6 K/ L     3.5-10.5              

    



             (test code = 775)                                        

 

             RED BLOOD CELL COUNT (BEAKER) 2.85 M/ L    3.93-5.22    L          

  



             (test code = 761)                                        

 

             HEMOGLOBIN (BEAKER) (test code = 8.9 GM/DL    11.2-15.7    L       

     



             410)                                                

 

             HEMATOCRIT (BEAKER) (test code = 29.9 %       34.1-44.9    L       

     



             411)                                                

 

             MEAN CORPUSCULAR VOLUME (BEAKER) 105 fL       79-95        H       

     



             (test code = 753)                                        

 

             MEAN CORPUSCULAR HEMOGLOBIN 31.2 pg      25.6-32.2                 



             (BEAKER) (test code = 751)                                        

 

             MEAN CORPUSCULAR HEMOGLOBIN CONC 29.8 GM/DL   32.2-35.5    L       

     



             (BEAKER) (test code = 752)                                        

 

             RED CELL DISTRIBUTION WIDTH 14.0 %       11.7-14.4                 



             (BEAKER) (test code = 412)                                        

 

             PLATELET COUNT (BEAKER) (test code 67 K/CU MM   150-450      L     

       



             = 756)                                              

 

             MEAN PLATELET VOLUME (BEAKER) 9.6 fL       9.4-12.3                

  



             (test code = 754)                                        

 

             NUCLEATED RED BLOOD CELLS (BEAKER) 0 /100 WBC   0-0                

       



             (test code = 413)                                        

 

             NEUTROPHILS RELATIVE PERCENT 76 %                                  

 



             (BEAKER) (test code = 429)                                        

 

             LYMPHOCYTES RELATIVE PERCENT 15 %                                  

 



             (BEAKER) (test code = 430)                                        

 

             MONOCYTES RELATIVE PERCENT 7 %                                    



             (BEAKER) (test code = 431)                                        

 

             EOSINOPHILS RELATIVE PERCENT 1 %                                   

 



             (BEAKER) (test code = 432)                                        

 

             BASOPHILS RELATIVE PERCENT 0 %                                    



             (BEAKER) (test code = 437)                                        

 

             NEUTROPHILS ABSOLUTE COUNT 4.27 K/ L    1.56-6.13                 



             (BEAKER) (test code = 670)                                        

 

             LYMPHOCYTES ABSOLUTE COUNT 0.86 K/ L    1.18-3.74    L            



             (BEAKER) (test code = 414)                                        

 

             MONOCYTES ABSOLUTE COUNT (BEAKER) 0.37 K/ L    0.24-0.36    H      

      



             (test code = 415)                                        

 

             EOSINOPHILS ABSOLUTE COUNT 0.05 K/ L    0.04-0.36                 



             (BEAKER) (test code = 416)                                        

 

             BASOPHILS ABSOLUTE COUNT (BEAKER) 0.02 K/ L    0.01-0.08           

      



             (test code = 417)                                        

 

             IMMATURE GRANULOCYTES-RELATIVE 0.50 %       0.00-1.00              

   



             PERCENT (BEAKER) (test code =                                      

  



             2801)                                               



ECG 12 [id number] 22:49:39





             Test Item    Value        Reference Range Interpretation Comments

 

             Ventricular rate (test                                        



             code = 253)                                         

 

             QRSD interval (test                                        



             code = 260)                                         

 

             QT interval (test code                                        



             = 264)                                              

 

             QTC interval (test code                                        



             = 265)                                              

 

             QRS axis 1 (test code =                                        



             268)                                                

 

             T wave axis (test code                                        



             = 270)                                              

 

             EKG impression (test Atrial                                 



             code = 273)  fibrillation-Rightward                           



                          axis-ST & T wave                           



                          abnormality, consider                           



                          inferior                               



                          ischemia-Abnormal                           



                          ECG-In automated                           



                          comparison with ECG of                           



                          2022 02:20,-QRS                           



                          axis shifted                           



                          right-Electronically                           



                          Signed By Neftali Subramanian MD (2008) on                           



                          2022 4:49:37 PM                           



[facility]Presence] in Respiratory specimen 
by EDWARD with probe [id number] 04:29:38





             Test Item    Value        Reference Range Interpretation Comments

 

             SARS-CoV-2 (COVID-19) RNA Not detected                           



             [Presence] in Respiratory                                        



             specimen by EDWARD with probe                                        



             detection (test code = 79991-7)                                    

    

 

             Whether patient is employed in a Unknown                           

     



             healthcare setting (test code =                                    

    



             41185-7)                                            

 

             Whether the patient has symptoms Unknown                           

     



             related to condition of interest                                   

     



             (test code = 85255-0)                                        

 

             Whether the patient was Unknown                                



             hospitalized for condition of                                      

  



             interest (test code = 37728-6)                                     

   

 

             Whether the patient was admitted Unknown                           

     



             to intensive care unit (ICU) for                                   

     



             condition of interest (test code                                   

     



             = 71873-1)                                          

 

             Whether patient resides in a Unknown                               

 



             congregate care setting (test                                      

  



             code = 38320-3)                                        

 

             Pregnancy status (test code = Unknown                              

  



             25256-1)                                            

 

             Date and time of symptom onset Unknown                             

   



             (test code = 81152-0)                                        



[id number]49:00





             Test Item    Value        Reference    Interpretation Comments



                                       Range                     

 

             PTH (test code = 146 pg/mL    16-77        H             Interpreti

ve Guide Intact



             2731-8)                                             PTH



                                                                 Calcium--------

----------



                                                                 ----------  ---

----Normal



                                                                 Parathyroid Nor

mal



                                                                 NormalHypoparat

hyroidism



                                                                 Low or Low Norm

al



                                                                 LowHyperparathy

roidism



                                                                 Primary Normal 

or High



                                                                 High Secondary 

 High



                                                                 Normal or Low T

ertiary



                                                                 High HighNon-Pa

rathyroid



                                                                 Hypercalcemia L

ow or Low



                                                                 Normal High

 

             SUGEY (test code = FASTING:NO                             



             SUGEY)         FASTING: NO                            

 

             RAC (test code = Performing                             



             RAC)         Organization                           



                          Information:                           



                          Site ID: RGA                           



                          Name: [facility]                           



                          [facility]                               



                          Address: [address] [place] [address]                            



                          [address]                             



                          Director:                              



                          Vignesh Felix                           

 

             Lab          Abnormal                               



             Interpretation                                        



             (test code =                                        



             06031-2)                                            



[facility] [id number] 17:49:00





             Test Item    Value        Reference Range Interpretation Comments

 

             TSH (test                 See_Comment                [Automated mes

zia]



             code =                                              The system whic

h



             3016-3)                                             generated this



                                                                 result transmit

kolby



                                                                 reference range

:



                                                                 0.40 - 4.50 mIU

/L.



                                                                 The reference r

cheyenne



                                                                 was not used to



                                                                 interpret this



                                                                 result as



                                                                 normal/abnormal

.

 

             SUGEY (test    FASTING:NO FASTING:                           



             code = SUGEY)  NO                                     

 

             RAC (test    Performing                             



             code = RAC)  Organization                           



                          Information: Site ID:                           



                          [facility]                           



                          Lab Address: [address] [place] [address]                            



                          [id number] Director:                           



                          [facility] [id number] 17:49:00





             Test Item    Value        Reference Range Interpretation Comments

 

             Vitamin D,   82 ng/mL                         Vitamin D Statu

s



             25-hydroxy (test                                        25-OH Vitam

in D:



             code = -3)                                        Deficiency: <

20



                                                                 ng/mLInsufficie

ncy



                                                                 : 20 - 29



                                                                 ng/mLOptimal: >

 or



                                                                 = 30 ng/mL For



                                                                 25-OH Vitamin D



                                                                 testing on



                                                                 patients on



                                                                 D2-supplementat

ion



                                                                 and patients fo

r



                                                                 whom quantitati

on



                                                                 of D2 and D3



                                                                 fractions is



                                                                 required, the



                                                                 [employer](TM

)25



                                                                 -OH VIT D,



                                                                 (D2,D3), LC/MS/

MS



                                                                 is recommended:



                                                                 order code 9288

8



                                                                 (patients



                                                                 >2yrs).See Note

 1



                                                                 Note 1 For



                                                                 additional



                                                                 information,



                                                                 please refer to



                                                                 http://educatio

n.Q



                                                                 [facility]

.co



                                                                 m/faq/YKD896 (T

his



                                                                 link is being



                                                                 provided for



                                                                 informational/e

radu



                                                                 ational purpose

s



                                                                 only.)

 

             SUGEY (test code = FASTING:NO FASTING:                           



             SUGEY)         NO                                     

 

             RAC (test code = Performing                             



             RAC)         Organization                           



                          Information: Site                           



                          ID: RGA [facility]                           



                          Lab Address: [address] [place] [address]                            



                          [address] Director:                           



                          Vignesh Greenwoodridge                           



[facility] [id number] 04:51:00





             Test Item    Value        Reference Range Interpretation Comments

 

             POC glucose (test code = 129 mg/dL    65-99        H            Ope

rator Name:



             24798-4)                                            Juliana Rose~Daniela

ce



                                                                 ID:



                                                                 [id number]~Luzmaria

table



                                                                 : HMW Notified 

RN

 

             Lab Interpretation (test Abnormal                               



             code = 52890-0)                                        



[facility], (w Contrast, Strain and
3D if needed)2022 14:28:07





             Test Item    Value        Reference    Interpretation Comments



                                       Range                     

 

             RA pressure (test              mmHg                      



             code = 8705110510)                                        

 

             EF (test code = 50 %         54-74        A            



             8451107386)                                         

 

             IVS,d (test code = 0.92 cm      0.6-0.9      A            



             0729270290)                                         

 

             IVS s 2D (test code 1.08 cm                                



             = 6914376455)                                        

 

             LVPWD,d (test code = 0.93 cm      0.60-1.19                 



             6235191515)                                         

 

             LVPW s PLAX (test 1.13 cm                                



             code = 8668226560)                                        

 

             LV,s (test code = 2.94 cm                                



             0980743344)                                         

 

             LVOT Diam,S (test 1.98 cm                                



             code = 1903009081)                                        

 

             LV PEREZ VOL (test 66.04 ml                         



             code = 0791333929)                                        

 

             LV SYS VOL (test 33.32 ml     14-42                     



             code = 2899407270)                                        

 

             MV Peak E Adama (test 1.61 m/s                               



             code = 4950110010)                                        

 

             MV Peak A Adama (test 0.58 m/s                               



             code = 5492199471)                                        

 

             E/A ratio (test code              See_Comment  A             [Autom

ated



             = 0948141871)                                        message] The



                                                                 system which



                                                                 generated this



                                                                 result



                                                                 transmitted



                                                                 reference range

:



                                                                 <=0.8. The



                                                                 reference range



                                                                 was not used to



                                                                 interpret this



                                                                 result as



                                                                 normal/abnormal

.

 

             E wave decelartion              See_Comment  A             [Automat

ed



             time (test code =                                        message] T

he



             5916461079)                                         system which



                                                                 generated this



                                                                 result



                                                                 transmitted



                                                                 reference range

:



                                                                 200 msec. The



                                                                 reference range



                                                                 was not used to



                                                                 interpret this



                                                                 result as



                                                                 normal/abnormal

.

 

             LV,d (test code = 3.90 cm                                



             7023792190)                                         

 

             IVS/LVPW,2D (test                                        



             code = 8419020507)                                        

 

             LV EF,2D (test code 57.26 %                                



             = 7562844264)                                        

 

             LV FS Cube 2D (test                                        



             code = 3455636303)                                        

 

             LV FS Teich 2D (test                                        



             code = 9151804211)                                        

 

             LV SV Teich 2D (test 32.73 ml                               



             code = 8156051600)                                        

 

             LV Vol s Teich PSAX 33.32 ml                               



             (test code =                                        



             7316454443)                                         

 

             LVOT stroke volume 0.46 cm3                               



             (test code =                                        



             0212153521)                                         

 

             Left Atrium  5.30 cm      See_Comment  A             [Automated



             Dimension Anterior                                        message] 

The



             (test code =                                        system which



             3071318537)                                         generated this



                                                                 result



                                                                 transmitted



                                                                 reference range

:



                                                                 <=3.8. The



                                                                 reference range



                                                                 was not used to



                                                                 interpret this



                                                                 result as



                                                                 normal/abnormal

.

 

             LA Vol MOD A4C (test 130.09 ml                              



             code = 6933116425)                                        

 

             LA area s A4C (test 36.52 cm2                              



             code = 1849928401)                                        

 

             LVOT area (test code 3.08 cm2                               



             = 8732392586)                                        

 

             LVOT Vmax (test code 0.85 m/s                               



             = 4331874941)                                        

 

             AoV Mean PG (test              See_Comment                [Automate

d



             code = 7890058686)                                        message] 

The



                                                                 system which



                                                                 generated this



                                                                 result



                                                                 transmitted



                                                                 reference range

:



                                                                 20 mmHg. The



                                                                 reference range



                                                                 was not used to



                                                                 interpret this



                                                                 result as



                                                                 normal/abnormal

.

 

             AoV Peak PG (test              mmHg                      



             code = 1845028589)                                        

 

             AV LVOT peak              mmHg                      



             gradient (test code                                        



             = 9542178965)                                        

 

             AoV Area, Vmax (test 1.94 cm2     See_Comment                [Autom

ated



             code = 7144620478)                                        message] 

The



                                                                 system which



                                                                 generated this



                                                                 result



                                                                 transmitted



                                                                 reference range

:



                                                                 >=1.5. The



                                                                 reference range



                                                                 was not used to



                                                                 interpret this



                                                                 result as



                                                                 normal/abnormal

.

 

             LVOT VTI (CM) (test 15.00 cm                               



             code = 9310182106)                                        

 

             AoV Vmax (test code 1.35 m/s                               



             = 7093334406)                                        

 

             AoV Vmn (test code = 0.82 m/s                               



             2966101222)                                         

 

             AoV Area, VTI (test 2.30 cm2                               



             code = 3121390665)                                        

 

             LVOT CO (test code = 4.72 l/min                             



             8655714876)                                         

 

             LVOT HR for LVOT CO              bpm                       



             (test code =                                        



             4178403900)                                         

 

             Velocity Ratio 0.63 m/s                               



             (V1/V2) (test code =                                        



             4689)                                               

 

             MV mean gradient              See_Comment                [Automated



             (test code =                                        message] The



             9758113097)                                         system which



                                                                 generated this



                                                                 result



                                                                 transmitted



                                                                 reference range

:



                                                                 5 mmHg. The



                                                                 reference range



                                                                 was not used to



                                                                 interpret this



                                                                 result as



                                                                 normal/abnormal

.

 

             MR peak grad (test              mmHg                      



             code = 4687730331)                                        

 

             MV stenosis pressure 30.12 ms     See_Comment                [Autom

ated



             1/2 time (test code                                        message]

 The



             = 4007563457)                                        system which



                                                                 generated this



                                                                 result



                                                                 transmitted



                                                                 reference range

:



                                                                 <=150. The



                                                                 reference range



                                                                 was not used to



                                                                 interpret this



                                                                 result as



                                                                 normal/abnormal

.

 

             MV E A ratio (test                                        



             code = 9271300077)                                        

 

             MV valve area p 1/2 7.30 cm2                               



             method (test code =                                        



             7147666158)                                         

 

             MV VTI Tips (test 0.15 m                                 



             code = 4273320850)                                        

 

             MV Vmax (test code = 0.72 m                                 



             0356898865)                                         

 

             RVSP (test code =              See_Comment  A             [Automate

d



             8941785565)                                         message] The



                                                                 system which



                                                                 generated this



                                                                 result



                                                                 transmitted



                                                                 reference range

:



                                                                 40.00 mmHg. The



                                                                 reference range



                                                                 was not used to



                                                                 interpret this



                                                                 result as



                                                                 normal/abnormal

.

 

             TR pk grad (test              mmHg                      



             code = 3464179522)                                        

 

             TR Vpeak (test code 2.85 m/s                               



             = 0647533900)                                        

 

             RVSP (TR) (test code              mmHg                      



             = 2576642870)                                        

 

             RVOT Vmax (test code 0.44 m/s                               



             = 3314054363)                                        

 

             PV Mean Grad (test              mmHg                      



             code = 5259500846)                                        

 

             PV Pk Grad (test              See_Comment                [Automated



             code = 2332284226)                                        message] 

The



                                                                 system which



                                                                 generated this



                                                                 result



                                                                 transmitted



                                                                 reference range

:



                                                                 36 mmHg. The



                                                                 reference range



                                                                 was not used to



                                                                 interpret this



                                                                 result as



                                                                 normal/abnormal

.

 

             PV VTI (test code = 0.17 m                                 



             8870523862)                                         

 

             RVOT pk grad (test              mmHg                      



             code = 0185047042)                                        

 

             PV VMAX (test code = 1.05 m/s     See_Comment                [Autom

ated



             [id number])                                         message] The



                                                                 system which



                                                                 generated this



                                                                 result



                                                                 transmitted



                                                                 reference range

:



                                                                 <=3. The



                                                                 reference range



                                                                 was not used to



                                                                 interpret this



                                                                 result as



                                                                 normal/abnormal

.

 

             PV Vmn (test code =                                        



             3135095539)                                         

 

             Ao Root Diameter 2.95 cm      See_Comment                [Automated



             (test code =                                        message] The



             4916162885)                                         system which



                                                                 generated this



                                                                 result



                                                                 transmitted



                                                                 reference range

:



                                                                 <=3.99. The



                                                                 reference range



                                                                 was not used to



                                                                 interpret this



                                                                 result as



                                                                 normal/abnormal

.

 

             Ao Root Diameter 2.95 cm                                



             (test code =                                        



             0720152618)                                         

 

             Ascending aorta 2.58 cm                                



             (test code =                                        



             6489869983)                                         

 

             Pred METS R1 (test                                        



             code = 7426621151)                                        

 

             Pred Exer Dur R1                                        



             (test code =                                        



             5499519200)                                         

 

             MV Decel slope (test 15.54 m/s2                             



             code = 7601236016)                                        

 

             LVPW pct thck PLAX 20.67 %                                



             (test code =                                        



             1078613376)                                         

 

             LV vol s cube 2D 25.42 ml                               



             (test code =                                        



             8948678433)                                         

 

             LV vol d cube 2D 59.47 ml                               



             (test code =                                        



             8842261249)                                         

 

             LV SV Cube 2D (test 34.05 ml                               



             code = 2679550273)                                        

 

             IVS pct thck PLAX 17.04 %                                



             (test code =                                        



             9500830230)                                         

 

             Calc MPHR (test code              bpm                       



             = 1830899271)                                        

 

             85 of MPHR (test                                        



             code = 4809597009)                                        

 

             RVOT VTI (test code 0.08 m                                 



             = 4024537478)                                        

 

             RVOT Vmn (test code 0.30 m/s                               



             = 7329919028)                                        

 

             RVOT mean grad (test              mmHg                      



             code = 1879468473)                                        

 

             MAX Pred HR (test                                        



             code = 1307247543)                                        

 

             LVOT mean grad (test              mmHg                      



             code = 0253412782)                                        

 

             Aov area Vmn (test 2.31 cm2                               



             code = 0469972515)                                        

 

             MV AE ratio (test                                        



             code = 7691504382)                                        

 

             LVOT Vmn (test code                                        



             = 5451434709)                                        

 

             MR Vmax (test code = 4.65 m/s                               



             0146431668)                                         

 

             AoV VTI (test code = 0.20 m                                 



             4179715540)                                         

 

             LVOT VTI (test code 0.15 m                                 



             = 5824337753)                                        

 

                          SUGEY (test code =          

                                                           



             SUGEY)          Left Ventricle:                           



                          Normal systolic                           



                          function with a                           



                          visually estimated                           



                          EF of 55 - 60%.                           



                          Grade III                              



                          (restrictive)                           



                          diastolic                              



                                                    dysfunction.

                                                           



                           Right Ventricle:                           



                          Right ventricle is                           



                          moderately dilated.                           



                          Mildly reduced                           



                                                    systolic function.

                                                           



                           Tricuspid Valve:                           



                          Moderate valvular                           



                          regurgitation. Left                           



                          VentricleLeft                           



                          ventricle size is                           



                          normal. Normal                           



                          systolic function                           



                          with a visually                           



                          estimated EF of 55 -                           



                          60%. Grade III                           



                          (restrictive)                           



                          diastolic                              



                          dysfunction.Right                           



                          VentricleRight                           



                          ventricle is                           



                          moderately dilated.                           



                          Mildly reduced                           



                          systolic                               



                          function.Left                           



                          AtriumLeft atrium is                           



                          severely                               



                          dilated.Right                           



                          AtriumRight atrium                           



                          is severely                            



                          dilated.Mitral                           



                          ValveMild mitral                           



                          annular                                



                          calcification. Mild                           



                          valvular                               



                          regurgitation.Tricus                           



                          pid ValveValve                           



                          structure is normal.                           



                          Moderate valvular                           



                          regurgitation.Aortic                           



                          ValveValve structure                           



                          is normal. No                           



                          significant valvular                           



                          regurgitation.Pulmon                           



                          ic ValveValve                           



                          structure is normal.                           



                          Mild valvular                           



                          regurgitation.Perica                           



                          rdiumThere is no                           



                          pericardial effusion                           



                          present.Study                           



                          DetailsStudy quality                           



                          was adequate. A                           



                          complete 2D, color                           



                          flow Doppler and                           



                          spectral Doppler                           



                          echocardiogram was                           



                          performed.The                           



                          apical, parasternal,                           



                          subcostal and                           



                          suprasternal views                           



                          were obtained.                           



                          Technical                              



                          difficulties due to                           



                          lung artifact.                           

 

             Lab Interpretation Abnormal                               



             (test code =                                        



             14219-7)                                            



[facility] Respiratory specimen 
by EDWARD with probe [id number] 19:34:22





             Test Item    Value        Reference Range Interpretation Comments

 

             SARS-CoV-2 (COVID-19) RNA [Presence] Detected                      

         



             in Respiratory specimen by EDWARD with                                

        



             probe detection (test code =                                       

 



             26046-9)                                            

 

             Whether patient is employed in a Unknown                           

     



             healthcare setting (test code =                                    

    



             41662-0)                                            

 

             Whether the patient has symptoms Unknown                           

     



             related to condition of interest                                   

     



             (test code = 59464-3)                                        

 

             Whether the patient was hospitalized Unknown                       

         



             for condition of interest (test code                               

         



             = 30945-5)                                          

 

             Whether the patient was admitted to Unknown                        

        



             intensive care unit (ICU) for                                      

  



             condition of interest (test code =                                 

       



             97989-8)                                            

 

             Whether patient resides in a Unknown                               

 



             congregate care setting (test code =                               

         



             06316-3)                                            

 

             Pregnancy status (test code = Unknown                              

  



             51678-2)                                            

 

             Date and time of symptom onset (test Unknown                       

         



             code = 99093-4)                                        



[facility] METABOLIC [id number] 16:39:00





             Test Item    Value        Reference Range Interpretation Comments

 

             SODIUM (test code = NA) 138 mEq/L    134-147      N            

 

             POTASSIUM (test code = 3.7 mEq/L    3.4-5.0      N            



             K)                                                  

 

             CHLORIDE (test code = 100 mEq/L    100-108      N            



             CL)                                                 

 

             CARBON DIOXIDE (test 28 mEq/l     21-33        N            



             code = CO2)                                         

 

             ANION GAP (test code = 13           0-20         N            



             GAP)                                                

 

             GLUCOSE (test code = 77 mg/dL            N            



             GLU)                                                

 

             BLOOD UREA NITROGEN 13 mg/dL     7-18         N            



             (test code = BUN)                                        

 

             GLOMERULAR FILTRATION 11.6         80-90        L            Units 

of measure =



             RATE (test code = GFR)                                        ml/mi

n/1.73 m2

 

             CREATININE (test code = 3.9 mg/dL    0.6-1.3      H            



             CREAT)                                              

 

             CALCIUM (test code = 8.8 mg/dL    8.0-10.5     N            



             CA)                                                 



PROTHROMBIN [id number] 16:33:00





             Test Item    Value        Reference Range Interpretation Comments

 

             PROTHROMBIN TIME 13.1 SECONDS 9.3-12.9     H            



             PATIENT (test code =                                        



             PTP)                                                

 

             INTERNATIONAL NORMAL 1.2          0.8-1.2      N             TARGET

 INR BY



             RATIO (test code =                                        INDICATIO

N Indication



             INR)                                                INR1. Prophylax

is of



                                                                 venous thrombos

is 2.0



                                                                 - 3.0 (orthoped

ic



                                                                 surgery), Proph

ylaxis



                                                                 of venous throm

bosis



                                                                 (other than hig

h-risk



                                                                 surgery), Treat

ment of



                                                                 Deep Vein



                                                                 Thrombosis/Pulm

onary



                                                                 Embolism, Preve

ntion



                                                                 of systemic emb

olism -



                                                                 Tissue heart va

lves,



                                                                 Acute Myocardia

l



                                                                 Infarction (to 

prevent



                                                                 systemic emboli

sm),



                                                                 Valvular heart



                                                                 disease, Atrial



                                                                 Fibrillation,



                                                                 Bileaflet mecha

nical



                                                                 valve in aortic



                                                                 position.2. Mec

hanical



                                                                 prosthetic valv

es



                                                                 (high risk), 2.

5 - 3.5



                                                                 Presence of Lup

us



                                                                 Anticoagulant o

r



                                                                 Antiphospholipi

d



                                                                 Antibodies, Pre

vention



                                                                 of systemic emb

olism -



                                                                 Acute Myocardia

l



                                                                 Infarction (to 

prevent



                                                                 recurrent infar

ct).



CBC W/AUTO [id number] 16:23:00





             Test Item    Value        Reference Range Interpretation Comments

 

             WHITE BLOOD CELL (test code = 9.2 x10 3/uL 4.5-11.0     N          

  



             WBC)                                                

 

             RED BLOOD CELL (test code = 3.17 x10 6/uL 3.54-5.02    L           

 



             RBC)                                                

 

             HEMOGLOBIN (test code = HGB) 10.3 g/dL    11.0-15.0    L           

 

 

             HEMATOCRIT (test code = HCT) 33.2 %       33.0-45.0    N           

 

 

             MEAN CELL VOLUME (test code = 104.7 fL     81.0-99.0    H          

  



             MCV)                                                

 

             MEAN CELL HGB (test code = MCH) 32.5 pg      27.0-33.0    N        

    

 

             MEAN CELL HGB CONCETRATION 31.0 g/dL    33.0-37.0    L            



             (test code = MCHC)                                        

 

             RED CELL DISTRIBUTION WIDTH CV 14.5 %       11.5-14.5    N         

   



             (test code = RDW)                                        

 

             RED CELL DISTRIBUTION WIDTH SD 56.6 fL      37.0-54.0    H         

   



             (test code = RDW-SD)                                        

 

             PLATELET COUNT (test code = 108 x10 3/uL 150-400      L            



             PLT)                                                

 

             MEAN PLATELET VOLUME (test code 10.2 fL      7.0-9.0      H        

    



             = MPV)                                              

 

             NEUTROPHIL % (test code = NT%) 80.2 %       56.0-77.0    H         

   

 

             IMMATURE GRANULOCYTE % (test 1.4 %        0.0-2.0      N           

 



             code = IG%)                                         

 

             LYMPHOCYTE % (test code = LY%) 11.0 %       14.0-32.0    L         

   

 

             MONOCYTE % (test code = MO%) 5.2 %        4.8-9.0      N           

 

 

             EOSINOPHIL % (test code = EO%) 1.5 %        0.3-3.7      N         

   

 

             BASOPHIL % (test code = BA%) 0.7 %        0.0-2.0      N           

 

 

             NUCLEATED RBC % (test code = 0.0 %        0-0          N           

 



             NRBC%)                                              

 

             NEUTROPHIL # (test code = NT#) 7.34 x10 3/uL 2.0-7.6      N        

    

 

             IMMATURE GRANULOCYTE # (test 0.13 x10 3/uL 0.00-0.03    H          

  



             code = IG#)                                         

 

             LYMPHOCYTE # (test code = LY#) 1.01 x10 3/uL 1.0-3.8      N        

    

 

             MONOCYTE # (test code = MO#) 0.48 x10 3/uL 0.1-0.8      N          

  

 

             EOSINOPHIL # (test code = EO#) 0.14 x10 3/uL 0.0-0.2      N        

    

 

             BASOPHIL # (test code = BA#) 0.06 x10 3/uL 0.0-0.2      N          

  

 

             NUCLEATED RBC # (test code = 0.00 x10 3/uL 0.0-0.1      N          

  



             NRBC#)                                              

 

             MANUAL DIFF REQUIRED (test code NO                                 

    



             = MDIFF)                                            



Hepatitis B surface [id number] 21:36:32





             Test Item    Value        Reference Range Interpretation Comments

 

             Hep B S Ab (test code <8.0         See_Comment                [Auto

mated



             = 01454-7)                                          message] The



                                                                 system which



                                                                 generated this



                                                                 result transmit

kolby



                                                                 reference range

:



                                                                 <8.0 mIU/mL. Th

e



                                                                 reference range



                                                                 was not used to



                                                                 interpret this



                                                                 result as



                                                                 normal/abnormal

.

 

             SUGEY (test code = SUGEY)  ID -                           



                          DB                                     

 

             Lab Interpretation Normal                                 



             (test code = 17069-5)                                        



[id number]32





             Test Item    Value        Reference Range Interpretation Comments

 

             Hep B S Ab (test code <8.0         See_Comment                [Auto

mated



             = 83058-1)                                          message] The



                                                                 system which



                                                                 generated this



                                                                 result transmit

kolby



                                                                 reference range

:



                                                                 <8.0 mIU/mL. Th

e



                                                                 reference range



                                                                 was not used to



                                                                 interpret this



                                                                 result as



                                                                 normal/abnormal

.

 

             SUGEY (test code = SUGEY)  ID -                           



                          DB                                     

 

             Lab Interpretation Normal                                 



             (test code = 48487-4)                                        



[facility] [id number] 21:36:32





             Test Item    Value        Reference Range Interpretation Comments

 

             Hep B S Ab (test code <8.0         See_Comment                [Auto

mated



             = 34061-0)                                          message] The



                                                                 system which



                                                                 generated this



                                                                 result transmit

kolby



                                                                 reference range

:



                                                                 <8.0 mIU/mL. 

e



                                                                 reference range



                                                                 was not used to



                                                                 interpret this



                                                                 result as



                                                                 normal/abnormal

.

 

             SUGEY (test code = SUGEY)  ID -                           



                          DB                                     

 

             Lab Interpretation Normal                                 



             (test code = 16756-0)                                        



[facility] [id number] 21:36:32





             Test Item    Value        Reference Range Interpretation Comments

 

             HEPATITIS B SURFACE ANTIBODY < mIU/mL     <8.0                     

 



             (BEAKER) (test code = 647)                                        



 ID - DBHepatitis B surface [id number] 21:24:22





             Test Item    Value        Reference Range Interpretation Comments

 

             Hepatitis B surface Nonreactive  Nonreactive               



             antigen (test code =                                        



             5195-3)                                             

 

             SUGEY (test code = SUGEY) Specimen is                            



                          considered negative                           



                          for HBsAg.                             

 

             Lab Interpretation (test Normal                                 



             code = 34972-3)                                        



[facility] [id number] 21:24:22





             Test Item    Value        Reference Range Interpretation Comments

 

             Hepatitis B surface Nonreactive  Nonreactive               



             antigen (test code =                                        



             5195-3)                                             

 

             SUGEY (test code = SUGEY) Specimen is                            



                          considered negative                           



                          for HBsAg.                             

 

             Lab Interpretation (test Normal                                 



             code = 28631-0)                                        



[facility] B SURFACE [id number] 21:24:22





             Test Item    Value        Reference Range Interpretation Comments

 

             HEPATITIS B SURFACE ANTIGEN (2) Nonreactive  Nonreactive           

    



             (BEAKER) (test code = 2585)                                        



Specimen is considered negative for HBsAg.Transthoracic Echocardiogram Complete,
(w Contrast, Strain and 3D if needed)2022 13:23:07





             Test Item    Value        Reference    Interpretation Comments



                                       Range                     

 

             EF (test code = 66 %         54-74                     



             [id number])                                         

 

             IVS,d (test code = 1.03 cm      0.6-0.9      A            



             1019190905)                                         

 

             LVPWD,d (test code = 1.27 cm      0.60-1.19    A            



             8618571994)                                         

 

             LV,s (test code = 2.56 cm                                



             4156207909)                                         

 

             LVOT Diam,S (test 2.01 cm                                



             code = 5077915093)                                        

 

             LV PEREZ VOL (test 69.20 ml                         



             code = 8100345570)                                        

 

             LV SYS VOL (test 23.74 ml     14-42                     



             code = 3230746526)                                        

 

             MV Peak E Adama (test 1.20 m/s                               



             code = 5021167792)                                        

 

             MV Peak A Adama (test 0.47 m/s                               



             code = 4470217943)                                        

 

             E/A ratio (test code              See_Comment  A             [Autom

ated



             = 1758073497)                                        message] The



                                                                 system which



                                                                 generated this



                                                                 result



                                                                 transmitted



                                                                 reference range

:



                                                                 <=0.8. The



                                                                 reference range



                                                                 was not used to



                                                                 interpret this



                                                                 result as



                                                                 normal/abnormal

.

 

             E wave decelartion              See_Comment  A             [Automat

ed



             time (test code =                                        message] T

he



             8680206529)                                         system which



                                                                 generated this



                                                                 result



                                                                 transmitted



                                                                 reference range

:



                                                                 200 msec. The



                                                                 reference range



                                                                 was not used to



                                                                 interpret this



                                                                 result as



                                                                 normal/abnormal

.

 

             LV,d (test code = 3.98 cm                                



             4021055897)                                         

 

             IVS/LVPW,2D (test                                        



             code = 5026857044)                                        

 

             LV EF,2D (test code 73.32 %                                



             = 5095170865)                                        

 

             LV FS Cube 2D (test                                        



             code = 4420294392)                                        

 

             LV FS Teich 2D (test                                        



             code = 6706061443)                                        

 

             LV SV Teich 2D (test 45.46 ml                               



             code = 2702290843)                                        

 

             LV Vol s Teich PSAX 23.74 ml                               



             (test code =                                        



             9739458278)                                         

 

             LVOT stroke volume 0.35 cm3                               



             (test code =                                        



             2025066642)                                         

 

             Left Atrium  4.52 cm      See_Comment  A             [Automated



             Dimension Anterior                                        message] 

The



             (test code =                                        system which



             7650841273)                                         generated this



                                                                 result



                                                                 transmitted



                                                                 reference range

:



                                                                 <=3.8. The



                                                                 reference range



                                                                 was not used to



                                                                 interpret this



                                                                 result as



                                                                 normal/abnormal

.

 

             LA Vol MOD A4C (test 83.62 ml                               



             code = 4656973714)                                        

 

             LA area s A4C (test 28.59 cm2                              



             code = 6682124308)                                        

 

             LA Ao Ratio Mmode                                        



             (test code =                                        



             4330986385)                                         

 

             LVOT area (test code 3.17 cm2                               



             = 5042075937)                                        

 

             LVOT Vmax (test code 0.62 m/s                               



             = 0957641577)                                        

 

             AoV Mean PG (test              See_Comment                [Automate

d



             code = 1601637324)                                        message] 

The



                                                                 system which



                                                                 generated this



                                                                 result



                                                                 transmitted



                                                                 reference range

:



                                                                 20 mmHg. The



                                                                 reference range



                                                                 was not used to



                                                                 interpret this



                                                                 result as



                                                                 normal/abnormal

.

 

             AoV Peak PG (test              mmHg                      



             code = 4808277718)                                        

 

             AV LVOT peak              mmHg                      



             gradient (test code                                        



             = 9927428719)                                        

 

             AoV Area, Vmax (test 2.00 cm2     See_Comment                [Autom

ated



             code = 9905009478)                                        message] 

The



                                                                 system which



                                                                 generated this



                                                                 result



                                                                 transmitted



                                                                 reference range

:



                                                                 >=1.5. The



                                                                 reference range



                                                                 was not used to



                                                                 interpret this



                                                                 result as



                                                                 normal/abnormal

.

 

             LVOT VTI (CM) (test 11.00 cm                               



             code = 3172912999)                                        

 

             AoV Vmax (test code 0.98 m/s                               



             = 2934361746)                                        

 

             AoV Vmn (test code = 0.65 m/s                               



             1344490234)                                         

 

             AoV Area, VTI (test 2.42 cm2                               



             code = 2377925066)                                        

 

             Velocity Ratio 0.63 m/s                               



             (V1/V2) (test code =                                        



             4689)                                               

 

             MR peak grad (test              mmHg                      



             code = 3421925788)                                        

 

             MV stenosis pressure 31.00 ms     See_Comment                [Autom

ated



             1/2 time (test code                                        message]

 The



             = 2051275622)                                        system which



                                                                 generated this



                                                                 result



                                                                 transmitted



                                                                 reference range

:



                                                                 <=150. The



                                                                 reference range



                                                                 was not used to



                                                                 interpret this



                                                                 result as



                                                                 normal/abnormal

.

 

             MV E A ratio (test                                        



             code = 6897050873)                                        

 

             MV valve area p 1/2 7.10 cm2                               



             method (test code =                                        



             9564209276)                                         

 

             E prime lat (test                                        



             code = 5561705718)                                        

 

             E prine sept (test                                        



             code = 0333432899)                                        

 

             RVSP (test code =              See_Comment  A             [Automate

d



             2370374233)                                         message] The



                                                                 system which



                                                                 generated this



                                                                 result



                                                                 transmitted



                                                                 reference range

:



                                                                 40.00 mmHg. The



                                                                 reference range



                                                                 was not used to



                                                                 interpret this



                                                                 result as



                                                                 normal/abnormal

.

 

             RA pressure (test              mmHg                      



             code = 1468040609)                                        

 

             TR pk grad (test              mmHg                      



             code = 7735575181)                                        

 

             TR Vpeak (test code 3.51 m/s                               



             = 1859525234)                                        

 

             RVSP (TR) (test code              mmHg                      



             = 2618127283)                                        

 

             RVOT Vmax (test code 0.46 m/s                               



             = 4171320155)                                        

 

             PV Pk Grad (test              See_Comment                [Automated



             code = 1425254320)                                        message] 

The



                                                                 system which



                                                                 generated this



                                                                 result



                                                                 transmitted



                                                                 reference range

:



                                                                 36 mmHg. The



                                                                 reference range



                                                                 was not used to



                                                                 interpret this



                                                                 result as



                                                                 normal/abnormal

.

 

             RVOT pk grad (test              mmHg                      



             code = 1964317777)                                        

 

             PV VMAX (test code = 1.15 m/s     See_Comment                [Autom

ated



             3389086431)                                         message] The



                                                                 system which



                                                                 generated this



                                                                 result



                                                                 transmitted



                                                                 reference range

:



                                                                 <=3. The



                                                                 reference range



                                                                 was not used to



                                                                 interpret this



                                                                 result as



                                                                 normal/abnormal

.

 

             Ao root annulus 2.82 cm                                



             (test code =                                        



             0320813453)                                         

 

             Ao Root Diameter 3.23 cm      See_Comment                [Automated



             (test code =                                        message] The



             5555544657)                                         system which



                                                                 generated this



                                                                 result



                                                                 transmitted



                                                                 reference range

:



                                                                 <=3.99. The



                                                                 reference range



                                                                 was not used to



                                                                 interpret this



                                                                 result as



                                                                 normal/abnormal

.

 

             Ao Root Diameter 3.23 cm                                



             (test code =                                        



             7306277836)                                         

 

             Ascending aorta 3.69 cm                                



             (test code =                                        



             5672529872)                                         

 

             Pred METS R1 (test                                        



             code = 5402500207)                                        

 

             Pred Exer Dur R1                                        



             (test code =                                        



             3025819400)                                         

 

             MV Decel slope (test 11.26 m/s2                             



             code = 4750652119)                                        

 

             LV vol s cube 2D 16.83 ml                               



             (test code =                                        



             5148226522)                                         

 

             LV vol d cube 2D 63.08 ml                               



             (test code =                                        



             3029049227)                                         

 

             LV SV Cube 2D (test 46.25 ml                               



             code = 4546039158)                                        

 

             Calc MPHR (test code              bpm                       



             = 7823630786)                                        

 

             85 of MPHR (test                                        



             code = 5059166000)                                        

 

             MAX Pred HR (test                                        



             code = 8860830140)                                        

 

             LVOT mean grad (test              mmHg                      



             code = 9104620886)                                        

 

             Aov area Vmn (test 1.92 cm2                               



             code = 3999006098)                                        

 

             MV AE ratio (test                                        



             code = 0931222355)                                        

 

             LVOT Vmn (test code                                        



             = 1231661550)                                        

 

             MR Vmax (test code = 4.22 m/s                               



             [id number])                                         

 

             AoV VTI (test code = 0.15 m                                 



             [id number])                                         

 

             LVOT VTI (test code 0.11 m                                 



             = 7800204079)                                        

 

                          SUGEY (test code =          

                                                           



             SUGEY)          Left Ventricle:                           



                          Normal systolic                           



                          function with a                           



                          visually estimated                           



                          EF of 65 - 70%.                           



                          Grade I (impaired                           



                          relaxation)                            



                          diastolic                              



                                                    dysfunction.

                                                           



                           Left Atrium: Left                           



                          atrium is mildly                           



                                                    dilated.

                                                           



                           Right Ventricle:                           



                          Right ventricle is                           



                          moderately dilated.                           



                          Reduced systolic                           



                                                    function.

                                                           



                           Right Atrium: Right                           



                          atrium is moderately                           



                                                    dilated.

                                                           



                           Mitral Valve: Valve                           



                          structure is normal.                           



                          Mildly calcified                           



                          leaflets. Mild                           



                          valvular                               



                                                    regurgitation.

                                                           



                           Tricuspid Valve:                           



                          Moderate valvular                           



                          regurgitation.                           



                          Moderately elevated                           



                          pulmonary artery                           



                          systolic pressure.                           



                          RVSP is 59.82 mmHg.                           



                          Left VentricleLeft                           



                          ventricle size is                           



                          normal. Normal                           



                          systolic function                           



                          with a visually                           



                          estimated EF of 65 -                           



                          70%. Grade I                           



                          (impaired                              



                          relaxation)                            



                          diastolic                              



                          dysfunction.Right                           



                          VentricleRight                           



                          ventricle is                           



                          moderately dilated.                           



                          Reduced systolic                           



                          function.Left                           



                          AtriumLeft atrium is                           



                          mildly dilated.Right                           



                          AtriumRight atrium                           



                          is moderately                           



                          dilated.Mitral                           



                          ValveValve structure                           



                          is normal. Mildly                           



                          calcified leaflets.                           



                          Mild valvular                           



                          regurgitation.Tricus                           



                          pid ValveValve                           



                          structure is normal.                           



                          Moderate valvular                           



                          regurgitation.                           



                          Moderately elevated                           



                          pulmonary artery                           



                          systolic pressure.                           



                          RVSP is 59.82                           



                          mmHg.Aortic                            



                          ValveValve structure                           



                          appears tricuspid.                           



                          No significant                           



                          valvular                               



                          regurgitation. No                           



                          stenosis.Pulmonic                           



                          ValveValve structure                           



                          is                                     



                          normal.PericardiumTh                           



                          ere is no                              



                          pericardial effusion                           



                          present.Study                           



                          DetailsStudy quality                           



                          was good. A complete                           



                          2D, color flow                           



                          Doppler and spectral                           



                          Doppler                                



                          echocardiogram was                           



                          performed.The                           



                          apical, parasternal,                           



                          subcostal and                           



                          suprasternal views                           



                          were obtained.                           



                          Patient exhibited                           



                          sinus tachycardia.                           

 

             Lab Interpretation Abnormal                               



             (test code =                                        



             32791-5)                                            



[facility] [id number]00





             Test Item    Value        Reference    Interpretation Comments



                                       Range                     

 

             Urine culture Proteus [id number]              A            Specime

n



             isolate (test cfu/mlThe                              InformationSpe

[facility]



             code = 72218-9) performance                            Source: Urin

eSpecimen



                          characteristics of                           Site: [facility]

an catch



                          this assay on this                           



                          isolatewere                            



                          validated by the                           



                          Microbiology                           



                          Laboratory at                           



                          [facility]. This                           



                          source has not been                           



                          approved by the                           



                          U.S. Food and Drug                           



                          Administration.                           



                          The results are not                           



                          intended to be used                           



                          as the sole means                           



                          for clinical                           



                          diagnosis or                           



                          patient management.                           



                          The Microbiology                           



                          Laboratory is                           



                          authorized under                           



                          the clinical                           



                          Laboratory                             



                          Improvement                            



                          Amendments of 1988                           



                          (CLIA-88) to                           



                          perform high                           



                          complexity testing.                           

 

             Lab          Abnormal                               



             Interpretation                                        



             (test code =                                        



             94423-3)                                            



[facility]Presence] in Respiratory specimen 
by EDWARD with probe [id number] 00:41:02





             Test Item    Value        Reference Range Interpretation Comments

 

             SARS-CoV-2 (COVID-19) RNA Not detected                           



             [Presence] in Respiratory                                        



             specimen by EDWARD with probe                                        



             detection (test code = 18160-6)                                    

    

 

             Whether patient is employed in a Unknown                           

     



             healthcare setting (test code =                                    

    



             62323-0)                                            

 

             Whether the patient has symptoms Unknown                           

     



             related to condition of interest                                   

     



             (test code = 21823-5)                                        

 

             Whether the patient was Unknown                                



             hospitalized for condition of                                      

  



             interest (test code = 54181-9)                                     

   

 

             Whether the patient was admitted Unknown                           

     



             to intensive care unit (ICU) for                                   

     



             condition of interest (test code                                   

     



             = 18543-2)                                          

 

             Whether patient resides in a Unknown                               

 



             congregate care setting (test                                      

  



             code = 04621-2)                                        

 

             Pregnancy status (test code = Unknown                              

  



             74418-3)                                            

 

             Date and time of symptom onset Unknown                             

   



             (test code = 31227-1)                                        



[facility]- XR [id number] 10:28:00
************************************************************[facility] : 1955 Sex: 
F************************************************************ Name: 
SUZI SEARS [facility] : 1955 Age/S: 66 / F [id number] [place] 
Unit #: [id number] Loc: [place] [address] Phys: Suzie Dominguez MD  Acct: 
[id number] Dis Date: Status: REG SDC PHONE #: [phone] Exam Date: 
2022 0950 FAX #:  Reason: ERCP EXAMS: CPT: [id number] XR ERCP 21159 Fluoro 
Time: 33 SEC DAP (Gy m2): Air Kerma (mGy): EXAMINATION: - XR ERCP. LOCATION: 
S17. HISTORY: ERCP, CBD obstruction.  COMPARISON: None. FINDINGS/ IMPRESSION: 
Nine portable limited intraoperative fluoroscopic images of right upper quadrant
 during ERCP are submitted to radiology department. Initial image demonstrates 
CBD stent and postoperative clips in upper abdomen. Subsequent images 
demonstrate contrast opacification of CBD  and small bowel. Please see operative
 report for further details. No radiologist was present during procedure. 
FLUOROSCOPIC TIME: 35.6 seconds. Reference air Kerma: 11.146 mGy. ** 
Electronically Signed by ISH Paz ** ** on 2022 at 1028 **
 Reported and signed by: Roverto Paz M.D. CC: Suzie Dominguez MD; 
Charisse Coyne MD  PAGE 1 Signed Report Name: SUZI SEARS [place] 
: 1955 Age/S: 66 / F [id number] [place] Unit #: [id number] Loc: 
[place] [address] Phys: Suzie Dominguez MD Acct: [id number] Dis Date: Status: 
REG SDC PHONE #: [phone] Exam Date: 2022 0950 FAX #: Reason: ERCP 
EXAMS: CPT: [id number] XR ERCP 46487 Fluoro Time: 33 SEC DAP (Gy m2): Air Kerma 
(mGy): (Continued) Technologist: Maribel Diaz, RT(R) Trnscb Date/Time: 
2022 (1028) t.SDR.[email or web address]  Orig Print D/T: S: 2022 (1032) PAGE 2 Signed
 [id number] 08:51:00





             Test Item    Value        Reference Range Interpretation Comments

 

             POTASSIUM (test code = K) 5.1 mmol/L   3.4-5.0      H            



CBC W/AUTO [id number] 08:15:00





             Test Item    Value        Reference Range Interpretation Comments

 

             WHITE BLOOD CELL (test code = 9.2 K/mm3    3.5-11.0     N          

  



             WBC)                                                

 

             RED BLOOD CELL (test code = 3.76 M/mm3   4.70-6.10    L            



             RBC)                                                

 

             HEMOGLOBIN (test code = HGB) 11.8 G/DL    10.4-14.9    N           

 

 

             HEMATOCRIT (test code = HCT) 37.7 %       31.5-44.1    N           

 

 

             MEAN CELL VOLUME (test code = 100.3 Fl     84.5-98.6    H          

  



             MCV)                                                

 

             MEAN CELL HGB (test code = MCH) 31.4 pg      27.0-34.2    N        

    

 

             MEAN CELL HGB CONCETRATION 31.3 G/DL    31.5-34.0    L            



             (test code = MCHC)                                        

 

             RED CELL DISTRIBUTION WIDTH 13.2 SD      11.5-14.5    N            



             (test code = RDW)                                        

 

             PLATELET COUNT (test code = 136 K/mm3    150-450      L            



             PLT)                                                

 

             MEAN PLATELET VOLUME (test code 9.80 fL      7.0-10.5     N        

    



             = MPV)                                              

 

             NEUTROPHIL % (test code = NT%) 71.9 %       40-76        N         

   

 

             IMMATURE GRANULOCYTE % (test 0.9 %        0.0-5.0      N           

 



             code = IG%)                                         

 

             LYMPHOCYTE % (test code = LY%) 18.1 %       20.5-51.1    L         

   

 

             MONOCYTE % (test code = MO%) 6.8 %        1.7-9.3      N           

 

 

             EOSINOPHIL % (test code = EO%) 1.5 %        0.0-6.0      N         

   

 

             BASOPHIL % (test code = BA%) 0.8 %        0.0-2.0      N           

 

 

             NUCLEATED RBC % (test code = 0.0 /100WBC% 0.0-1.0      N           

 



             NRBC%)                                              

 

             NEUTROPHIL # (test code = NT#) 6.7 K/mm3    1.8-7.6      N         

   

 

             IMMATURE GRANULOCYTE # (test 0.08 x10 3/uL 0.00-0.03    H          

  



             code = IG#)                                         

 

             LYMPHOCYTE # (test code = LY#) 1.7 K/mm3    0.6-3.2      N         

   

 

             MONOCYTE # (test code = MO#) 0.6 K/mm3    0.3-1.1      N           

 

 

             EOSINOPHIL # (test code = EO#) 0.1 K/mm3    0.0-0.4      N         

   

 

             BASOPHIL # (test code = BA#) 0.1 K/mm3    0.0-0.1      N           

 

 

             NUCLEATED RBC # (test code = 0.0 K/mm3    0.0-0.1      N           

 



             NRBC#)                                              

 

             MANUAL DIFF REQUIRED (test code NO DIFF/SCN  CRITERIA              

    



             = MDIFF)                                            



BASIC METABOLIC [id number] 08:08:00





             Test Item    Value        Reference Range Interpretation Comments

 

             SODIUM (test code = NA) 135 mmol/L   134-147      N            

 

             POTASSIUM (test code = K) 5.9 mmol/L   3.4-5.0      HH           

 

             CHLORIDE (test code = CL) 103 mmol/L   100-108      N            

 

             CARBON DIOXIDE (test code = CO2) 27 mmol/L    21-32        N       

     

 

             ANION GAP (test code = GAP) 5.0 GAP calc 4.0-15.0     N            

 

             GLUCOSE (test code = GLU) 109 MG/DL           N            

 

             BLOOD UREA NITROGEN (test code = 43 MG/DL     7-18         H       

     



             BUN)                                                

 

             GLOMERULAR FILTRATION RATE (test 4 estGFR     >60          L       

     



             code = GFR)                                         

 

             CREATININE (test code = CREAT) 10.5 MG/DL   0.6-1.0      H         

   

 

             CALCIUM (test code = CA) 9.9 MG/DL    8.5-10.1     N            



COVID 19 INHOUSE [id number] 14:11:00





             Test Item    Value        Reference Range Interpretation Comments

 

             COVID 19 INHOUSE AG NEGATIVE     Negative                  Per manu

facturer,



             (test code =                                        negative result

s should



             [id number])                                        be treated aspr

esumptive



                                                                 and, if inconsi

stent with



                                                                 clinical signs



                                                                 andsymptoms or 

necessary



                                                                 for patient man

agement,



                                                                 should betested

 with an



                                                                 alternative mol

ecular



                                                                 assay. Negative

 resultsdo



                                                                 not preclude SA

RS-CoV-2



                                                                 infection and s

hould not



                                                                 be usedas the s

ole basis



                                                                 for patient man

agement



                                                                 decisions. Nega

tive



                                                                 results should 

be



                                                                 considered in t

he context



                                                                 of apatient's r

ecent



                                                                 exposures, hist

ory,



                                                                 presence of cli

nicalsigns



                                                                 and symptoms co

nsistent



                                                                 with COVID-19.



CBC W/AUTO [id number] 14:00:00





             Test Item    Value        Reference Range Interpretation Comments

 

             WHITE BLOOD CELL 7.2 K/mm3    3.5-11.0     N            



             (test code = WBC)                                        

 

             RED BLOOD CELL (test 4.01 M/mm3   4.70-6.10    L            



             code = RBC)                                         

 

             HEMOGLOBIN (test code 12.6 G/DL    10.4-14.9    N            



             = HGB)                                              

 

             HEMATOCRIT (test code 39.7 %       31.5-44.1    N            



             = HCT)                                              

 

             MEAN CELL VOLUME 99.0 Fl      84.5-98.6    H            



             (test code = MCV)                                        

 

             MEAN CELL HGB (test 31.4 pg      27.0-34.2    N            



             code = MCH)                                         

 

             MEAN CELL HGB 31.7 G/DL    31.5-34.0    N            



             CONCETRATION (test                                        



             code = MCHC)                                        

 

             RED CELL DISTRIBUTION 13.3 SD      11.5-14.5    N            



             WIDTH (test code =                                        



             RDW)                                                

 

             PLATELET COUNT (test 106 K/mm3    150-450      L            



             code = PLT)                                         

 

             MEAN PLATELET VOLUME 10.20 fL     7.0-10.5     N            



             (test code = MPV)                                        

 

             NEUTROPHIL % (test 72.0 %       40-76        N            



             code = NT%)                                         

 

             IMMATURE GRANULOCYTE 0.7 %        0.0-5.0      N            



             % (test code = IG%)                                        

 

             LYMPHOCYTE % (test 17.8 %       20.5-51.1    L            



             code = LY%)                                         

 

             MONOCYTE % (test code 7.4 %        1.7-9.3      N            



             = MO%)                                              

 

             EOSINOPHIL % (test 1.4 %        0.0-6.0      N            



             code = EO%)                                         

 

             BASOPHIL % (test code 0.7 %        0.0-2.0      N            



             = BA%)                                              

 

             NUCLEATED RBC % (test 0.0 /100WBC% 0.0-1.0      N            



             code = NRBC%)                                        

 

             NEUTROPHIL # (test 5.2 K/mm3    1.8-7.6      N            



             code = NT#)                                         

 

             IMMATURE GRANULOCYTE 0.05 x10 3/uL 0.00-0.03    H            



             # (test code = IG#)                                        

 

             LYMPHOCYTE # (test 1.3 K/mm3    0.6-3.2      N            



             code = LY#)                                         

 

             MONOCYTE # (test code 0.5 K/mm3    0.3-1.1      N            



             = MO#)                                              

 

             EOSINOPHIL # (test 0.1 K/mm3    0.0-0.4      N            



             code = EO#)                                         

 

             BASOPHIL # (test code 0.1 K/mm3    0.0-0.1      N            



             = BA#)                                              

 

             NUCLEATED RBC # (test 0.0 K/mm3    0.0-0.1      N            



             code = NRBC#)                                        

 

             MANUAL DIFF REQUIRED NO DIFF/SCN  CRITERIA                  SLIDE R

EVIEW



             (test code = MDIFF)                                        CONSISTA

NT WITH



                                                                 AUTO DIFFERENTI

AL.



BASIC METABOLIC [id number] 13:32:00





             Test Item    Value        Reference Range Interpretation Comments

 

             SODIUM (test code = NA) 136 mmol/L   134-147      N            

 

             POTASSIUM (test code = K) 5.1 mmol/L   3.4-5.0      H            

 

             CHLORIDE (test code = CL) 103 mmol/L   100-108      N            

 

             CARBON DIOXIDE (test code = CO2) 26 mmol/L    21-32        N       

     

 

             ANION GAP (test code = GAP) 7.0 GAP calc 4.0-15.0     N            

 

             GLUCOSE (test code = GLU) 103 MG/DL           N            

 

             BLOOD UREA NITROGEN (test code = 37 MG/DL     7-18         H       

     



             BUN)                                                

 

             GLOMERULAR FILTRATION RATE (test 6 estGFR     >60          L       

     



             code = GFR)                                         

 

             CREATININE (test code = CREAT) 7.4 MG/DL    0.6-1.0      H         

   

 

             CALCIUM (test code = CA) 10.1 MG/DL   8.5-10.1     N            



HEPATIC FUNCTION [id number] 13:32:00





             Test Item    Value        Reference Range Interpretation Comments

 

             TOTAL PROTEIN (test code = PROT) 7.7 G/DL     6.4-8.2      N       

     

 

             ALBUMIN (test code = ALB) 3.4 G/DL     3.4-5.0      N            

 

             BILIRUBIN TOTAL (test code = BILT) 2.10 MG/DL   0.2-1.2      H     

       

 

             BILIRUBIN DIRECT (test code = 0.50 MG/DL   0.00-0.30    H          

  



             BILD)                                               

 

             BILIRUBIN INDIRECT (test code = 1.60 MG/DL   0.2-1.2      H        

    



             BILIND)                                             

 

             SGOT/AST (test code = AST) 17 Unit/L    15-37        N            

 

             SGPT/ALT (test code = ALT) 27 Unit/L    12-78        N            

 

             ALKALINE PHOSPHATASE TOTAL (test 222 Unit/L          H       

     



             code = ALKP)                                        



[id number] 13:32:00





             Test Item    Value        Reference Range Interpretation Comments

 

             LIPASE (test code = LIP) 109 Unit/L   114-286      L            



[id number] 18:01:00





             Test Item    Value        Reference Range Interpretation Comments

 

             SURGICAL (test --------------------------------                    

       



             code = SR)   --------------------------------                      

     



                          ----------------------------RUN                       

    



                          DATE: 22 KHANH Rivero                           



                          [facility] [employer] [facility] PAGE 1 RUN TIME:                       

    



                          1801 Specimen Inquiry RUN USER:                       

    



                          INTERFACE                              



                          --------------------------------                      

     



                          --------------------------------                      

     



                          ----------------------------KYLE                      

     



                          ENT: SUZI SEARS ACCT #:                          

 



                          [id number] LOC: L.END U #:                          

 



                          [id number] AGE/SX: 66/F ROOM:                         

  



                          RE22REG DR:                           



                          Clark Cuellar MD :                           



                          57 BED:  DIS: STATUS: [facility] TLOC:                              



                          --------------------------------                      

     



                          --------------------------------                      

     



                          ----------------------------                          

 



                          SPEC #: 22:PMC: RECD:                           



                           STATUS: [facility]                        

   



                          #: [id number] SOFIA: 22-5                       

    



                          [facility] DR: Clark Cuellar MD                          

 



                          ENTERED:  SP TYPE:                       

    



                          SURGICAL OTHR DR:                           



                          Charisse Coyne MD ORDERED:                         

  



                          90750/2, 08302, 02654, 85924,                         

  



                          ANATOMIC SPEC, SPECIMEN TRACK                         

  



                          COPIES TO: Charisse Coyne MD                       

    



                          [address] [place] [address] [phone]                           



                          Clark Cuellar  [place], [place] [address] [phone] PROCEDURES: 19578                        

   



                          ()  96137                           



                          () 92503                           



                          () 65047                           



                          () SPECIMEN TRACK                        

   



                          () TISSUES: A.                           



                          GASTRIC MUCOSA - BX ANTRUM AND                        

   



                          BODY  B. ESOPHAGUS BIOPSY -                           



                          DISTAI ESOPHAGUS BX FINAL                           



                          DIAGNOSIS A. Stomach, antrum and                      

     



                          body, endoscopic biopsy:-                           



                          Healing/reparative/chemical                           



                          gastropathy changes, minimal to                       

    



                          mild- Negative for intestinal                         

  



                          metaplasia- Negative for                           



                          Helicobacter pylori                           



                          (Immunohistochemistry stain) B.                       

    



                          Esophagus, distal, endoscopic                         

  



                          biopsy:- Reflux esophagitis,                          

 



                          mild- Negative for glandular                          

 



                          epithelium/intestinal                           



                          metaplasia/dysplasia (Alcian                          

 



                          Blue stain)- Negative for fungal                      

     



                          organisms (PAS stain) Comment:                        

   



                          Suggest clinical correlation.                         

  



                          Note: For each marker stain                           



                          tested above: Positive control                        

   



                          is positive and                           



                          Negative(external or internal)                        

   



                          control is negative (staining                         

  



                          performed at [facility]).                       

    



                          ** CONTINUED ON NEXT PAGE **                          

 



                          --------------------------------                      

     



                          --------------------------------                      

     



                          ----------------------------RUN                       

    



                          DATE: 22 [facility] PAGE 2 RUN TIME:                       

    



                          1801  Specimen Inquiry RUN USER:                      

     



                          INTERFACE                              



                          --------------------------------                      

     



                          --------------------------------                      

     



                          ----------------------------SPEC                      

     



                          #: 22:PMC: PATIENT:                           



                          SUZI SEARS #[id number]                         

  



                          (Continued)---------------------                      

     



                          --------------------------------                      

     



                          --------------------------------                      

     



                          ------- GROSS DESCRIPTION A.                          

 



                          Antrum and body biopsy. It                           



                          consists of 4 tissue fragments                        

   



                          measuring 2-5 mm. All as A1. B.                       

    



                          Distal esophageal biopsy. It                          

 



                          consists of 2 tissue fragments                        

   



                          measuring 3 mm each. Allas B1.                        

   



                          Technical component performed at                      

     



                          [facility],[id number],                        

   



                          [place] [address] Unless gross                         

  



                          only, the diagnosis is based                          

 



                          upon microscopic                           



                          examination.Immunohistochemistry                      

     



                          : This test was developed and                         

  



                          its performancecharacteristics                        

   



                          determined by this laboratory.                        

   



                          It has not been approved nordoes                      

     



                          it need approval by the [place] FDA.                       

    



                          Appropriate positive and                           



                          negative controlsare reviewed                         

  



                          and judged to be acceptable.                          

 



                          This laboratory is certified                          

 



                          underthe Clinical Laboratory                          

 



                          Improvement Amendments (CLIA-88)                      

     



                          as qualified toperform high                           



                          complexity clinical laboratory                        

   



                          testing. MICROSCOPIC DESCRIPTION                      

     



                          Findings are incorporated into                        

   



                          the diagnosis                           



                          section.------------------------                      

     



                          --------------------------------                      

     



                          --------------------------------                      

     



                          ---- Signed SIGNATURE ON FILE                         

  



                          [facility] 22 180                         

  



                          --------------------------------                      

     



                          --------------------------------                      

     



                          ----------------------------  **                      

     



                          END OF REPORT **                           



BASIC METABOLIC [id number] 08:13:00





             Test Item    Value        Reference Range Interpretation Comments

 

             SODIUM (test code = NA) 137 mmol/L   134-147      N            

 

             POTASSIUM (test code = K) 4.2 mmol/L   3.4-5.0      N            

 

             CHLORIDE (test code = CL) 105 mmol/L   100-108      N            

 

             CARBON DIOXIDE (test code = CO2) 23 mmol/L    21-32        N       

     

 

             ANION GAP (test code = GAP) 9.0 GAP calc 4.0-15.0     N            

 

             GLUCOSE (test code = GLU) 111 MG/DL           H            

 

             BLOOD UREA NITROGEN (test code = 41 MG/DL     7-18         H       

     



             BUN)                                                

 

             GLOMERULAR FILTRATION RATE (test 5 estGFR     >60          L       

     



             code = GFR)                                         

 

             CREATININE (test code = CREAT) 8.6 MG/DL    0.6-1.0      H         

   

 

             CALCIUM (test code = CA) 9.8 MG/DL    8.5-10.1     N            



- XR CHEST [id number] -[id number] 13:28:00
************************************************************[facility] 1955 Sex: 
F************************************************************ Name: 
SUZI SEARS [place] : 1955 Age/S: 66 / F [id number] [place] 
Unit #: [id number] Loc: [place] [address] Phys: Clark Cuellar MD Acct: 
[id number] Dis Date: Status: PRE [facility] [place]ONE #: [phone] Exam Date: 
2022 1253 FAX #: Reason: PRE OP EXAMS: CPT: [id number] XR CHEST 2 V 42559 
Fluoro Time: DAP (Gy m2): Air Kerma (mGy): Chest 2 views 2022 1:27 PM 
CLINICAL HISTORY: Preop COMPARISON: None available LOCATION: W1 IMPRESSION: 
There is elevation of the right hemidiaphragm. Bibasilar opacities suggest 
atelectasis. Pneumonia should be excluded clinically. Cardiomediastinal contours
 are within normal limits. The central vasculature is not engorged. A tunneled 
left hemodialysis catheter is present. There are chronic appearing degenerative 
changes in the skeleton. ** Electronically Signed by M.D. Timothy Seipel ** ** 
on 2022 at 1328 ** Reported and signed by: Timothy Seipel, M.D. CC: 
Charisse Coyne MD; Clark Cuellar MD PAGE 1 Signed Report  Name: 
DAVID SEARS [place] : 1955 Age/S: 66 / F [id number] [place] 
Unit #: [id number] Loc: [place] [address] Phys: Clark Cuellar MD Acct: 
[id number] Dis Date: Status: PRE SDC PHONE #: [phone] Exam Date: 
2022 1253 FAX #: Reason: PRE OP EXAMS:  CPT: [id number] XR CHEST 2 V 26606 
Fluoro Time:DAP (Gy m2): Air Kerma (mGy): (Continued) Technologist: Kylah Jacobo
 RT (R)(CT) Trnscb Date/Time: 2022 (9862) [id number] Orig Print D/T: S: 
2022 (3711)  PAGE 2 Signed ReportBASIC METABOLIC [id number] 12:55:00





             Test Item    Value        Reference Range Interpretation Comments

 

             SODIUM (test code = NA) 134 mmol/L   134-147      N            

 

             POTASSIUM (test code = K) 4.6 mmol/L   3.4-5.0      N            

 

             CHLORIDE (test code = CL) 100 mmol/L   100-108      N            

 

             CARBON DIOXIDE (test code = CO2) 28 mmol/L    21-32        N       

     

 

             ANION GAP (test code = GAP) 6.0 GAP calc 4.0-15.0     N            

 

             GLUCOSE (test code = GLU) 113 MG/DL           H            

 

             BLOOD UREA NITROGEN (test code = 38 MG/DL     7-18         H       

     



             BUN)                                                

 

             GLOMERULAR FILTRATION RATE (test 6 estGFR     >60          L       

     



             code = GFR)                                         

 

             CREATININE (test code = CREAT) 7.0 MG/DL    0.6-1.0      H         

   

 

             CALCIUM (test code = CA) 10.5 MG/DL   8.5-10.1     H            



COVID 19 INHOUSE [id number] 12:52:00





             Test Item    Value        Reference Range Interpretation Comments

 

             COVID 19 INHOUSE AG NEGATIVE     Negative                  Per manu

facturer,



             (test code =                                        negative result

s should



             [id number])                                        be treated aspr

esumptive



                                                                 and, if inconsi

stent with



                                                                 clinical signs



                                                                 andsymptoms or 

necessary



                                                                 for patient man

agement,



                                                                 should betested

 with an



                                                                 alternative mol

ecular



                                                                 assay. Negative

 resultsdo



                                                                 not preclude SA

RS-CoV-2



                                                                 infection and s

hould not



                                                                 be usedas the s

ole basis



                                                                 for patient man

agement



                                                                 decisions. Nega

tive



                                                                 results should 

be



                                                                 considered in t

he context



                                                                 of apatient's r

ecent



                                                                 exposures, hist

ory,



                                                                 presence of cli

nicalsigns



                                                                 and symptoms co

nsistent



                                                                 with COVID-19.



PROTHROMBIN [id number] 12:44:00





             Test Item    Value        Reference Range Interpretation Comments

 

             PT PATIENT (test 12.2 SECONDS 9.3-12.9     N            



             code = PTP)                                         

 

             INTERNATIONAL NORMAL 1.07 INR Unit 0.8-1.2      N             TARGE

T INR BY



             RATIO (test code =                                        INDICATIO

N Indication



             INR)                                                INR1. Prophylax

is of



                                                                 venous thrombos

is 2.0



                                                                 - 3.0 (orthoped

ic



                                                                 surgery), Proph

ylaxis



                                                                 of venous throm

bosis



                                                                 (other than hig

h-risk



                                                                 surgery), Treat

ment of



                                                                 Deep Vein



                                                                 Thrombosis/Pulm

onary



                                                                 Embolism, Preve

ntion



                                                                 of systemic emb

olism -



                                                                 Tissue heart va

lves,



                                                                 Acute Myocardia

l



                                                                 Infarction (to 

prevent



                                                                 systemic emboli

sm),



                                                                 Valvular heart



                                                                 disease, Acute



                                                                 Myocardial Infa

rction



                                                                 (to prevent sys

temic



                                                                 embolism), Valv

ular



                                                                 heart disease, 

Atrial



                                                                 Fibrillation,



                                                                 Bileaflet mecha

nical



                                                                 valve in aortic



                                                                 position.2. Mec

hanical



                                                                 prosthetic valv

es



                                                                 (high risk), 2.

5 - 3.5



                                                                 Presence of Lup

us



                                                                 Anticoagulant o

r



                                                                 Antiphospholipi

d



                                                                 Antibodies, Pre

vention



                                                                 of systemic emb

olism -



                                                                 Acute Myocardia

l



                                                                 Infarction (to 

prevent



                                                                 recurrent infar

ct).



THROMBOPLASTIN TIME [id number] 12:44:00





             Test Item    Value        Reference Range Interpretation Comments

 

             THROMBOPLASTIN TIME PARTIAL 36.2 SECONDS 26-35        H            



             (test code = PTT)                                        



CBC W/AUTO [id number] 12:43:00





             Test Item    Value        Reference Range Interpretation Comments

 

             WHITE BLOOD CELL (test code = 9.1 K/mm3    3.5-11.0     N          

  



             WBC)                                                

 

             RED BLOOD CELL (test code = 4.16 M/mm3   4.70-6.10    L            



             RBC)                                                

 

             HEMOGLOBIN (test code = HGB) 13.1 G/DL    10.4-14.9    N           

 

 

             HEMATOCRIT (test code = HCT) 41.8 %       31.5-44.1    N           

 

 

             MEAN CELL VOLUME (test code = 100.5 Fl     84.5-98.6    H          

  



             MCV)                                                

 

             MEAN CELL HGB (test code = MCH) 31.5 pg      27.0-34.2    N        

    

 

             MEAN CELL HGB CONCETRATION 31.3 G/DL    31.5-34.0    L            



             (test code = MCHC)                                        

 

             RED CELL DISTRIBUTION WIDTH 13.8 SD      11.5-14.5    N            



             (test code = RDW)                                        

 

             PLATELET COUNT (test code = 140 K/mm3    150-450      L            



             PLT)                                                

 

             MEAN PLATELET VOLUME (test code 10.20 fL     7.0-10.5     N        

    



             = MPV)                                              

 

             NEUTROPHIL % (test code = NT%) 71.8 %       40-76        N         

   

 

             IMMATURE GRANULOCYTE % (test 1.3 %        0.0-5.0      N           

 



             code = IG%)                                         

 

             LYMPHOCYTE % (test code = LY%) 18.2 %       20.5-51.1    L         

   

 

             MONOCYTE % (test code = MO%) 6.7 %        1.7-9.3      N           

 

 

             EOSINOPHIL % (test code = EO%) 1.3 %        0.0-6.0      N         

   

 

             BASOPHIL % (test code = BA%) 0.7 %        0.0-2.0      N           

 

 

             NUCLEATED RBC % (test code = 0.0 /100WBC% 0.0-1.0      N           

 



             NRBC%)                                              

 

             NEUTROPHIL # (test code = NT#) 6.5 K/mm3    1.8-7.6      N         

   

 

             IMMATURE GRANULOCYTE # (test 0.12 x10 3/uL 0.00-0.03    H          

  



             code = IG#)                                         

 

             LYMPHOCYTE # (test code = LY#) 1.7 K/mm3    0.6-3.2      N         

   

 

             MONOCYTE # (test code = MO#) 0.6 K/mm3    0.3-1.1      N           

 

 

             EOSINOPHIL # (test code = EO#) 0.1 K/mm3    0.0-0.4      N         

   

 

             BASOPHIL # (test code = BA#) 0.1 K/mm3    0.0-0.1      N           

 

 

             NUCLEATED RBC # (test code = 0.0 K/mm3    0.0-0.1      N           

 



             NRBC#)                                              

 

             MANUAL DIFF REQUIRED (test code NO DIFF/SCN  CRITERIA              

    



             = MDIFF)                                            



SARS-CoV-2 (COVID-19) RNA [Presence] in Respiratory specimen by EDWARD with probe 
[id number] 23:10:10





             Test Item    Value        Reference Range Interpretation Comments

 

             SARS coronavirus RNA [Presence] Not detected                       

    



             in Isolate by EDWARD with probe                                       

 



             detection (test code = 55869-4)                                    

    

 

             Whether patient is employed in a No                                

     



             healthcare setting (test code =                                    

    



             26698-4)                                            

 

             Whether the patient has symptoms Yes                               

     



             related to condition of interest                                   

     



             (test code = 72817-9)                                        

 

             Whether the patient was No                                     



             hospitalized for condition of                                      

  



             interest (test code = 19566-1)                                     

   

 

             Whether the patient was admitted No                                

     



             to intensive care unit (ICU) for                                   

     



             condition of interest (test code                                   

     



             = 63240-9)                                          

 

             Whether patient resides in a No                                    

 



             congregate care setting (test                                      

  



             code = 46666-0)                                        

 

             Pregnancy status (test code = No                                   

  



             92575-3)                                            

 

             Date and time of symptom onset Unknown                             

   



             (test code = 57479-3)                                        



[facility] (COVID-19) RNA [Presence] in 
Respiratory specimen by EDWARD with probe [id number] 23:10:10





             Test Item    Value        Reference Range Interpretation Comments

 

             SARS-CoV-2 (COVID-19) RNA Not detected                           



             [Presence] in Respiratory                                        



             specimen by EDWARD with probe                                        



             detection (test code = 81420-7)                                    

    

 

             Whether patient is employed in a No                                

     



             healthcare setting (test code =                                    

    



             59339-1)                                            

 

             Whether the patient has symptoms Yes                               

     



             related to condition of interest                                   

     



             (test code = 50487-1)                                        

 

             Whether the patient was No                                     



             hospitalized for condition of                                      

  



             interest (test code = 62037-7)                                     

   

 

             Whether the patient was admitted No                                

     



             to intensive care unit (ICU) for                                   

     



             condition of interest (test code                                   

     



             = 50154-5)                                          

 

             Whether patient resides in a No                                    

 



             congregate care setting (test                                      

  



             code = 80124-3)                                        

 

             Pregnancy status (test code = No                                   

  



             49055-8)                                            

 

             Date and time of symptom onset Unknown                             

   



             (test code = 56355-9)                                        



[facility]CiE-8 (COVID-19) RNA [Presence] in 
Respiratory specimen by EDWARD with probe [id number] 22:34:30





             Test Item    Value        Reference Range Interpretation Comments

 

             SARS-CoV-2 (COVID-19) RNA Not detected Not-Detected              



             [Presence] in Respiratory                                        



             specimen by EDWARD with probe                                        



             detection (test code = 49824-8)                                    

    

 

             Whether patient is employed in a                                   

     



             healthcare setting (test code =                                    

    



             03501-4)                                            

 

             Whether the patient has symptoms                                   

     



             related to condition of interest                                   

     



             (test code = 34051-6)                                        

 

             Patient was hospitalized because                                   

     



             of this condition (test code =                                     

   



             85772-0)                                            

 

             Whether the patient was admitted                                   

     



             to intensive care unit (ICU) for                                   

     



             condition of interest (test code                                   

     



             = 18660-2)                                          

 

             Whether patient resides in a                                       

 



             congregate care setting (test                                      

  



             code = 49716-3)                                        



[facility] [Presence] in 
Respiratory specimen by EDWARD with probe [id number] 22:35:42





             Test Item    Value        Reference Range Interpretation Comments

 

             SARS-CoV-2 (COVID-19) RNA Not detected Not-Detected              



             [Presence] in Respiratory                                        



             specimen by EDWARD with probe                                        



             detection (test code = 27539-9)                                    

    

 

             Whether patient is employed in a                                   

     



             healthcare setting (test code =                                    

    



             30743-4)                                            

 

             Whether the patient has symptoms                                   

     



             related to condition of interest                                   

     



             (test code = 67456-3)                                        

 

             Patient was hospitalized because                                   

     



             of this condition (test code =                                     

   



             35348-9)                                            

 

             Whether the patient was admitted                                   

     



             to intensive care unit (ICU) for                                   

     



             condition of interest (test code                                   

     



             = 45089-4)                                          

 

             Whether patient resides in a                                       

 



             congregate care setting (test                                      

  



             code = 17808-2)                                        



[facility] RNA [Presence] in 
Respiratory specimen by EDWARD with probe [id number] 22:46:10





             Test Item    Value        Reference Range Interpretation Comments

 

             SARS-CoV-2 (COVID-19) RNA Not detected Not-Detected              



             [Presence] in Respiratory                                        



             specimen by EDWARD with probe                                        



             detection (test code = 05787-5)                                    

    

 

             Whether patient is employed in a                                   

     



             healthcare setting (test code =                                    

    



             00302-5)                                            

 

             Whether the patient has symptoms                                   

     



             related to condition of interest                                   

     



             (test code = 13102-1)                                        

 

             Patient was hospitalized because                                   

     



             of this condition (test code =                                     

   



             09621-4)                                            

 

             Whether the patient was admitted                                   

     



             to intensive care unit (ICU) for                                   

     



             condition of interest (test code                                   

     



             = 22227-6)                                          

 

             Whether patient resides in a                                       

 



             congregate care setting (test                                      

  



             code = 41673-8)                                        



[facility] [Presence] in 
Respiratory specimen by EDWARD with probe [id number] 01:54:24





             Test Item    Value        Reference Range Interpretation Comments

 

             SARS-CoV-2 (COVID-19) RNA Not detected Not-Detected              



             [Presence] in Respiratory                                        



             specimen by EDWARD with probe                                        



             detection (test code = 06161-7)                                    

    

 

             Whether patient is employed in a                                   

     



             healthcare setting (test code =                                    

    



             37326-2)                                            

 

             Whether the patient has symptoms                                   

     



             related to condition of interest                                   

     



             (test code = 14777-7)                                        

 

             Patient was hospitalized because                                   

     



             of this condition (test code =                                     

   



             43223-4)                                            

 

             Whether the patient was admitted                                   

     



             to intensive care unit (ICU) for                                   

     



             condition of interest (test code                                   

     



             = 51683-0)                                          

 

             Whether patient resides in a                                       

 



             congregate care setting (test                                      

  



             code = 22113-9)                                        



[facility] [Presence] in 
Respiratory specimen by EDWARD with probe [id number] 21:44:06





             Test Item    Value        Reference Range Interpretation Comments

 

             SARS-CoV-2 (COVID-19) RNA Not detected Not-Detected              



             [Presence] in Respiratory                                        



             specimen by EDWARD with probe                                        



             detection (test code = 12670-7)                                    

    

 

             Whether patient is employed in a                                   

     



             healthcare setting (test code =                                    

    



             45092-5)                                            

 

             Whether the patient has symptoms                                   

     



             related to condition of interest                                   

     



             (test code = 45200-5)                                        

 

             Patient was hospitalized because                                   

     



             of this condition (test code =                                     

   



             08603-0)                                            

 

             Whether the patient was admitted                                   

     



             to intensive care unit (ICU) for                                   

     



             condition of interest (test code                                   

     



             = 80768-7)                                          

 

             Whether patient resides in a                                       

 



             congregate care setting (test                                      

  



             code = 98582-1)                                        



[facility]-CoV-2 (COVID-19) RNA [Presence] in 
Respiratory specimen by EDWARD with probe [id number] 00:36:59





             Test Item    Value        Reference Range Interpretation Comments

 

             SARS-CoV-2 (COVID-19) RNA Not detected Not-Detected              



             [Presence] in Respiratory                                        



             specimen by EDWARD with probe                                        



             detection (test code = 34501-3)                                    

    



[facility] (COVID-19) RNA [Presence] in 
Respiratory specimen by EDWARD with probe [id number] 17:35:35





             Test Item    Value        Reference Range Interpretation Comments

 

             SARS-CoV-2 (COVID-19) RNA Not detected Not-Detected              



             [Presence] in Respiratory                                        



             specimen by EDWARD with probe                                        



             detection (test code = 37149-0)                                    

    



[facility] (COVID-19) RNA [Presence] in 
Respiratory specimen by EDWARD with probe [id number] 18:03:30





             Test Item    Value        Reference Range Interpretation Comments

 

             SARS-CoV-2 (COVID-19) RNA Not detected Not-Detected              



             [Presence] in Respiratory                                        



             specimen by EDWARD with probe                                        



             detection (test code = 14135-2)                                    

    



[facility]-CoV-2 (COVID-19) RNA [Presence] in 
Respiratory specimen by EDWARD with probe [id number] 10:06:03





             Test Item    Value        Reference Range Interpretation Comments

 

             SARS-CoV-2 (COVID-19) RNA Not detected Not-Detected              



             [Presence] in Respiratory                                        



             specimen by EDWARD with probe                                        



             detection (test code = 09689-0)                                    

    



[facility] (COVID-19) RNA [Presence] in 
Respiratory specimen by EDWARD with probe [id number] 05:11:58





             Test Item    Value        Reference Range Interpretation Comments

 

             SARS-CoV-2 (COVID-19) RNA Not detected Not-Detected              



             [Presence] in Respiratory                                        



             specimen by EDWARD with probe                                        



             detection (test code = 70683-0)                                    

    



[facility] (COVID-19) RNA [Presence] in 
Respiratory specimen by EDWARD with probe [id number] 03:34:16





             Test Item    Value        Reference Range Interpretation Comments

 

             SARS-CoV-2 (COVID-19) RNA Not detected Not-Detected              



             [Presence] in Respiratory                                        



             specimen by EDWARD with probe                                        



             detection (test code = 49739-3)                                    

    



[facility]-CoV-[facility] (COVID-19) RNA [Presence] in 
Respiratory specimen by EDWARD with probe [id number] 10:06:41





             Test Item    Value        Reference Range Interpretation Comments

 

             SARS-CoV-2 (COVID-19) RNA Not detected Not-Detected              



             [Presence] in Respiratory                                        



             specimen by EDWARD with probe                                        



             detection (test code = 28014-2)                                    

    



[facility]





             Test Item    Value        Reference Range Interpretation Comments

 

             Chol (test code = Chol) 129                                    



[facility]2020-10-14 12:33:00





             Test Item    Value        Reference Range Interpretation Comments

 

             HDL (test code = HDL) 37                                     



[facility]2020-10-14 12:33:00





             Test Item    Value        Reference Range Interpretation Comments

 

             Trig (test code = Trig) 76                                     



[facility]





             Test Item    Value        Reference Range Interpretation Comments

 

             LDL (Calculated) (test code = LDL 76                               

      



             (Calculated))                                        



[facility]2020-10-14 12:33:00





             Test Item    Value        Reference Range Interpretation Comments

 

             CHD Risk (test code = CHD Risk) 3.5                                

    



[facility]2020-10-14 12:33:00





             Test Item    Value        Reference Range Interpretation Comments

 

             Non HDL Chol (test code = Non HDL Chol) 92                         

            



[facility]2020-10-14 12:33:00





             Test Item    Value        Reference Range Interpretation Comments

 

             Chol (test code = Chol) 129                                    



[facility]2020-10-14 12:33:00





             Test Item    Value        Reference Range Interpretation Comments

 

             HDL (test code = HDL) 37                                     



[facility]2020-10-14 12:33:00





             Test Item    Value        Reference Range Interpretation Comments

 

             Trig (test code = Trig) 76                                     



[facility]2020-10-14 12:33:00





             Test Item    Value        Reference Range Interpretation Comments

 

             LDL (Calculated) (test code = LDL 76                               

      



             (Calculated))                                        



[facility]2020-10-14 12:33:00





             Test Item    Value        Reference Range Interpretation Comments

 

             CHD Risk (test code = CHD Risk) 3.5                                

    



[facility]2020-10-14 12:33:00





             Test Item    Value        Reference Range Interpretation Comments

 

             Non HDL Chol (test code = Non HDL Chol) 92                         

            



[facility]2020-10-14 12:33:00





             Test Item    Value        Reference Range Interpretation Comments

 

             Chol (test code = Chol) 129                                    



[facility]2020-10-14 12:33:00





             Test Item    Value        Reference Range Interpretation Comments

 

             HDL (test code = HDL) 37                                     



[facility]2020-10-14 12:33:00





             Test Item    Value        Reference Range Interpretation Comments

 

             Trig (test code = Trig) 76                                     



[facility]2020-10-14 12:33:00





             Test Item    Value        Reference Range Interpretation Comments

 

             LDL (Calculated) (test code = LDL 76                               

      



             (Calculated))                                        



[facility]2020-10-14 12:33:00





             Test Item    Value        Reference Range Interpretation Comments

 

             CHD Risk (test code = CHD Risk) 3.5                                

    



[facility]





             Test Item    Value        Reference Range Interpretation Comments

 

             Non HDL Chol (test code = Non HDL Chol) 92                         

            



[facility]2020-10-14 12:33:00





             Test Item    Value        Reference Range Interpretation Comments

 

             Chol (test code = Chol) 129                                    



[facility]2020-10-14 12:33:00





             Test Item    Value        Reference Range Interpretation Comments

 

             Chol (test code = Chol) 129                                    



[facility]2020-10-14 12:33:00





             Test Item    Value        Reference Range Interpretation Comments

 

             HDL (test code = HDL) 37                                     



[place] [address]-10-14 12:33:00





             Test Item    Value        Reference Range Interpretation Comments

 

             HDL (test code = HDL) 37                                     



[place] [address]-10-14 12:33:00





             Test Item    Value        Reference Range Interpretation Comments

 

             Trig (test code = Trig) 76                                     



[place] [address]-10-14 12:33:00





             Test Item    Value        Reference Range Interpretation Comments

 

             LDL (Calculated) (test code = LDL 76                               

      



             (Calculated))                                        



[place] [address]-10-14 12:33:00





             Test Item    Value        Reference Range Interpretation Comments

 

             CHD Risk (test code = CHD Risk) 3.5                                

    



[place] [address]-10-14 12:33:00





             Test Item    Value        Reference Range Interpretation Comments

 

             Non HDL Chol (test code = Non HDL Chol) 92                         

            



[place] [address]-10-14 12:33:00





             Test Item    Value        Reference Range Interpretation Comments

 

             Trig (test code = Trig) 76                                     



[place] [address]-10-14 12:33:00





             Test Item    Value        Reference Range Interpretation Comments

 

             LDL (Calculated) (test code = LDL 76                               

      



             (Calculated))                                        



[place] [address]-10-14 12:33:00





             Test Item    Value        Reference Range Interpretation Comments

 

             CHD Risk (test code = CHD Risk) 3.5                                

    



[place] 682350-10-14 12:33:00





             Test Item    Value        Reference Range Interpretation Comments

 

             Non HDL Chol (test code = Non HDL Chol) 92                         

            



[facility] [id number] 14:47:00





             Test Item    Value        Reference Range Interpretation Comments

 

             Vitamin D, 25-OH, Total (test code = 37                      

         



             Vitamin D, 25-OH, Total)                                        



[facility] [id number] 14:47:00





             Test Item    Value        Reference Range Interpretation Comments

 

             U Creat mg/dL (test code = U Creat 114                       

       



             mg/dL)                                              



[facility] [id number] 14:47:00





             Test Item    Value        Reference Range Interpretation Comments

 

             U Prot/Creat (test code = U Prot/Creat) 430                  

            



[facility] [id number] 14:47:00





             Test Item    Value        Reference Range Interpretation Comments

 

             U Prot/Creat (test code = U 0.430 1      0.021-0.161               



             Prot/Creat)                                         



[facility] [id number] 14:47:00





             Test Item    Value        Reference Range Interpretation Comments

 

             U Protein (test code = U Protein) 49           5-24                

      



[place] 501270-08-06 14:47:00





             Test Item    Value        Reference Range Interpretation Comments

 

             Glucose Lvl (test code = Glucose Lvl) 103          65-99           

          



[place] 501270-08-06 14:47:00





             Test Item    Value        Reference Range Interpretation Comments

 

             BUN (test code = BUN) 24           7-25                      



[facility] Campussc[facility] mobilityEC[facility] Bert[facility] [id number] 14:47:00





             Test Item    Value        Reference Range Interpretation Comments

 

             Creatinine Lvl (test code = Creatinine 1.32         0.50-0.99      

           



             Lvl)                                                



[facility] Campussc[facility] mobilityECU [facility] Bert[facility] [id number] 14:47:00





             Test Item    Value        Reference Range Interpretation Comments

 

             eGFR NON-AFR. AMERICAN (test code = 43                             

        



             eGFR NON-AFR. AMERICAN)                                        



[facility] [id number] 14:47:00





             Test Item    Value        Reference Range Interpretation Comments

 

             eGFR  (test code = eGFR 50                         

            



             )                                        



[facility] [id number] 14:47:00





             Test Item    Value        Reference Range Interpretation Comments

 

             B/C Ratio (test code = B/C Ratio) 18           6-22                

      



[facility] [id number] 14:47:00





             Test Item    Value        Reference Range Interpretation Comments

 

             Sodium Lvl (test code = Sodium Lvl) 138          135-146           

        



[facility] [id number] 14:47:00





             Test Item    Value        Reference Range Interpretation Comments

 

             Potassium Lvl (test code = Potassium 4.4          3.5-5.3          

         



             Lvl)                                                



[facility] [id number] 14:47:00





             Test Item    Value        Reference Range Interpretation Comments

 

             Chloride Lvl (test code = Chloride Lvl) 102                  

            



[facility] [id number] 14:47:00





             Test Item    Value        Reference Range Interpretation Comments

 

             CO2 (test code = CO2) 30           20-32                     



[facility] [id number] 14:47:00





             Test Item    Value        Reference Range Interpretation Comments

 

             Calcium Lvl (test code = Calcium Lvl) 9.4          8.6-10.4        

          



[facility] [id number] 14:47:00





             Test Item    Value        Reference Range Interpretation Comments

 

             Phosphorus (test code = Phosphorus) 4.8          2.5-4.5           

        



[facility]2020-08-06 14:47:00





             Test Item    Value        Reference Range Interpretation Comments

 

             Albumin Lvl (test code = Albumin Lvl) 3.9          3.6-5.1         

          



[id number] 14:47:00





             Test Item    Value        Reference Range Interpretation Comments

 

             Plt Count Estimated (test code = DECREASED                         

     



             Plt Count Estimated)                                        



[facility] WestFapkdwjUGIFFZOCXO9613-08-07 14:47:00





             Test Item    Value        Reference Range Interpretation Comments

 

             WBC X 10x3 (test code = WBC X 10x3) 7.2          3.8-10.8          

        



[place] 951190-08-06 14:47:00





             Test Item    Value        Reference Range Interpretation Comments

 

             RBC X 10x6 (test code = RBC X 10x6) 3.71         3.80-5.10         

        



[facility] [id number] 14:47:00





             Test Item    Value        Reference Range Interpretation Comments

 

             Hgb (test code = Hgb) 10.7         11.7-15.5                 



[facility] WestDdzqrhiJPSNNDGHDG2160-70-53 14:47:00





             Test Item    Value        Reference Range Interpretation Comments

 

             Hct (test code = Hct) 33.9         35.0-45.0                 



[facility] WestAtxlueeKGDXGHHDOC9818-47-24 14:47:00





             Test Item    Value        Reference Range Interpretation Comments

 

             MCV (test code = MCV) 91.4         80.0-100.0                



[facility] WestHswmmuaQHDRREIRBE2703-64-98 14:47:00





             Test Item    Value        Reference Range Interpretation Comments

 

             MCH (test code = MCH) 28.8 pg      27.0-33.0                 



[facility] WestIjezwifBNRBWWRGED2558-50-96 14:47:00





             Test Item    Value        Reference Range Interpretation Comments

 

             MCHC (test code = MCHC) 31.6         32.0-36.0                 



[place] 951190-08-06 14:47:00





             Test Item    Value        Reference Range Interpretation Comments

 

             RDW (test code = RDW) 13.9         11.0-15.0                 



[place] 951190-08-06 14:47:00





             Test Item    Value        Reference Range Interpretation Comments

 

             Platelet (test code = Platelet) 110          140-400               

    



[facility] WestJljsyzxUXXRIJSTRL0249-20-16 14:47:00





             Test Item    Value        Reference Range Interpretation Comments

 

             MPV (test code = MPV) 11.7         7.5-12.5                  



[facility] [id number] 14:47:00





             Test Item    Value        Reference Range Interpretation Comments

 

             Neutrophils # (test code = Neutrophils 5414         4893-6271      

           



             #)                                                  



[facility] CenterXstshecDICZGTNCZQ9575-04-71 14:47:00





             Test Item    Value        Reference Range Interpretation Comments

 

             Lymphocytes # (test code = Lymphocytes 1152         850-3900       

           



             #)                                                  



[facility]AylrmznRPDANQETCO4924[facility] 14:47:00





             Test Item    Value        Reference Range Interpretation Comments

 

             Monocytes # (test code = Monocytes #) 461          200-950         

          



[facility] CenterQstpzntWSFTNBBSZX2903-19-33 14:47:00





             Test Item    Value        Reference Range Interpretation Comments

 

             Eosinophils # (test code = Eosinophils 122                   

           



             #)                                                  



[facility] MichiganOcezsmqKRZBWDZOXI4165-16-41 14:47:00





             Test Item    Value        Reference Range Interpretation Comments

 

             Basophils # (test code 50           See_Comment                [Aut

omated message] The



             = Basophils #)                                        system which 

generated



                                                                 this result tra

nsmitted



                                                                 reference range

: <=200.



                                                                 The reference r

cheyenne was



                                                                 not used to int

erpret



                                                                 this result as



                                                                 normal/abnormal

.



[facility] MichiganIxdvtryMPMIYQTZTZ5472-74-02 14:47:00





             Test Item    Value        Reference Range Interpretation Comments

 

             Segs (test code = Segs) 75.2                                   



[facility] WestMcjipwrGDJBGMAOKK4539-81-20 14:47:00





             Test Item    Value        Reference Range Interpretation Comments

 

             Lymphocytes (test code = Lymphocytes) 16.0                         

          



[facility] MichiganCkmuizcSDNQKUYXIO2541-78-73 14:47:00





             Test Item    Value        Reference Range Interpretation Comments

 

             Monocytes (test code = Monocytes) 6.4                              

      



[facility] MichiganDwwvokwYNZLRLHTCA5516-24-52 14:47:00





             Test Item    Value        Reference Range Interpretation Comments

 

             Eosinophils (test code = Eosinophils) 1.7                          

          



[facility] CenterGwbtjqyFALNLHQRTC8048-02-77 14:47:00





             Test Item    Value        Reference Range Interpretation Comments

 

             Basophils (test code = Basophils) 0.7                              

      



[facility] [id number] 14:47:00





             Test Item    Value        Reference Range Interpretation Comments

 

             Result 2 (Urine Culture) See Result Comment                        

   



             (test code = Result 2                                        



             (Urine Culture))                                        



[facility] AND [id number] 14:47:00





             Test Item    Value        Reference Range Interpretation Comments

 

             UA Color (test code = UA Color) YELLOW                             

    



[facility] AND [id number] 14:47:00





             Test Item    Value        Reference Range Interpretation Comments

 

             UA Turbidity (test code = UA CLOUDY                                

 



             Turbidity)                                          



[facility] AND [id number] 14:47:00





             Test Item    Value        Reference Range Interpretation Comments

 

             UA Spec Grav (test code = UA Spec 1.017 1      1.001-1.035         

      



             Grav)                                               



[facility] [id number] 14:47:00





             Test Item    Value        Reference Range Interpretation Comments

 

             UA pH (test code = UA pH) 5.5 1        5.0-8.0                   



[facility] [id number] 14:47:00





             Test Item    Value        Reference Range Interpretation Comments

 

             UA Glucose (test code = UA Glucose) NEGATIVE                       

        



[facility] [id number] 14:47:00





             Test Item    Value        Reference Range Interpretation Comments

 

             Vitamin D, 25-OH, Total (test code = 37                      

         



             Vitamin D, 25-OH, Total)                                        



[facility] [id number] 14:47:00





             Test Item    Value        Reference Range Interpretation Comments

 

             UA Bili (test code = UA Bili) NEGATIVE                             

  



[facility] [id number] 14:47:00





             Test Item    Value        Reference Range Interpretation Comments

 

             UA Ketones (test code = UA Ketones) NEGATIVE                       

        



[facility] [id number] 14:47:00





             Test Item    Value        Reference Range Interpretation Comments

 

             UA Blood (test code = UA Blood) 1+                                 

    



[facility] [id number] 14:47:00





             Test Item    Value        Reference Range Interpretation Comments

 

             UA Protein (test code = UA Protein) 1+                             

        



[facility] [id number] 14:47:00





             Test Item    Value        Reference Range Interpretation Comments

 

             UA Nitrite (test code = UA Nitrite) POSITIVE                       

        



[facility] [id number] 14:47:00





             Test Item    Value        Reference Range Interpretation Comments

 

             UA Leuk Est (test code = UA Leuk Est) 2+                           

          



[facility] [id number] 14:47:00





             Test Item    Value        Reference Range Interpretation Comments

 

             UA WBC (test code = UA WBC) > OR = 60                              



[facility] [id number] 14:47:00





             Test Item    Value        Reference Range Interpretation Comments

 

             UA RBC (test code = UA RBC) 0-2                                    



[facility] [id number] 14:47:00





             Test Item    Value        Reference Range Interpretation Comments

 

             UA Sq Epi (test code = UA Sq Epi) NONE SEEN                        

      



[facility] [id number] 14:47:00





             Test Item    Value        Reference Range Interpretation Comments

 

             UA Bacteria (test code = UA Bacteria) MANY                         

          



[facility]2020-08-06 14:47:00





             Test Item    Value        Reference Range Interpretation Comments

 

             U Creat mg/dL (test code = U Creat 114                       

       



             mg/dL)                                              



[facility] AND [id number] 14:47:00





             Test Item    Value        Reference Range Interpretation Comments

 

             UA Hyal Cast (test code = UA Hyal NONE SEEN                        

      



             Cast)                                               



[facility] AND [id number] 14:47:00





             Test Item    Value        Reference Range Interpretation Comments

 

             UA Reflex (test code CULTURE INDICATED -                           



             = UA Reflex) RESULTS TO FOLLOW                           



[facility]2020-08-06 14:47:00





             Test Item    Value        Reference Range Interpretation Comments

 

             U Creat mg/dL (test code = U Creat 114                       

       



             mg/dL)                                              



[facility]2020-08-06 14:47:00





             Test Item    Value        Reference Range Interpretation Comments

 

             U Alb (test code = U Alb) 16.7                                   



[place] 074470-08-06 14:47:00





             Test Item    Value        Reference Range Interpretation Comments

 

             U Alb/Crea (test code = U Alb/Crea) 146                            

        



[facility]2020-08-06 14:47:00





             Test Item    Value        Reference Range Interpretation Comments

 

             U Prot/Creat (test code = U Prot/Creat) 430                  

            



[facility]2020-08-06 14:47:00





             Test Item    Value        Reference Range Interpretation Comments

 

             U Prot/Creat (test code = U 0.430 1      0.021-0.161               



             Prot/Creat)                                         



[facility]2020-08-06 14:47:00





             Test Item    Value        Reference Range Interpretation Comments

 

             U Protein (test code = U Protein) 49           5-24                

      



[place] 501270-08-06 14:47:00





             Test Item    Value        Reference Range Interpretation Comments

 

             Glucose Lvl (test code = Glucose Lvl) 103          65-99           

          



[place] 501270-08-06 14:47:00





             Test Item    Value        Reference Range Interpretation Comments

 

             BUN (test code = BUN) 24           7-25                      



[place] 501270-08-06 14:47:00





             Test Item    Value        Reference Range Interpretation Comments

 

             Creatinine Lvl (test code = Creatinine 1.32         0.50-0.99      

           



             Lvl)                                                



[place] 501270-08-06 14:47:00





             Test Item    Value        Reference Range Interpretation Comments

 

             eGFR NON-AFR. AMERICAN (test code = 43                             

        



             eGFR NON-AFR. AMERICAN)                                        



[place] 501270-08-06 14:47:00





             Test Item    Value        Reference Range Interpretation Comments

 

             eGFR  (test code = eGFR 50                         

            



             )                                        



[place] 501270-08-06 14:47:00





             Test Item    Value        Reference Range Interpretation Comments

 

             B/C Ratio (test code = B/C Ratio) 18           6-22                

      



[place] 501270-08-06 14:47:00





             Test Item    Value        Reference Range Interpretation Comments

 

             Sodium Lvl (test code = Sodium Lvl) 138          135-146           

        



[place] 501270-08-06 14:47:00





             Test Item    Value        Reference Range Interpretation Comments

 

             Potassium Lvl (test code = Potassium 4.4          3.5-5.3          

         



             Lvl)                                                



[facility]2020-08-06 14:47:00





             Test Item    Value        Reference Range Interpretation Comments

 

             Chloride Lvl (test code = Chloride Lvl) 102                  

            



[place] 501270-08-06 14:47:00





             Test Item    Value        Reference Range Interpretation Comments

 

             CO2 (test code = CO2) 30           20-32                     



[place] 501270-08-06 14:47:00





             Test Item    Value        Reference Range Interpretation Comments

 

             Calcium Lvl (test code = Calcium Lvl) 9.4          8.6-10.4        

          



[place] 501270-08-06 14:47:00





             Test Item    Value        Reference Range Interpretation Comments

 

             Phosphorus (test code = Phosphorus) 4.8          2.5-4.5           

        



[place] 501270-08-06 14:47:00





             Test Item    Value        Reference Range Interpretation Comments

 

             Albumin Lvl (test code = Albumin Lvl) 3.9          3.6-5.1         

          



[place] 951190-08-06 14:47:00





             Test Item    Value        Reference Range Interpretation Comments

 

             Plt Count Estimated (test code = DECREASED                         

     



             Plt Count Estimated)                                        



[place] 951190-08-06 14:47:00





             Test Item    Value        Reference Range Interpretation Comments

 

             WBC X 10x3 (test code = WBC X 10x3) 7.2          3.8-10.8          

        



[place] 39219-08-06 14:47:00





             Test Item    Value        Reference Range Interpretation Comments

 

             RBC X 10x6 (test code = RBC X 10x6) 3.71         3.80-5.10         

        



[place] 951190-08-06 14:47:00





             Test Item    Value        Reference Range Interpretation Comments

 

             Hgb (test code = Hgb) 10.7         11.7-15.5                 



[place] 392[address]-08-06 14:47:00





             Test Item    Value        Reference Range Interpretation Comments

 

             Hct (test code = Hct) 33.9         35.0-45.0                 



[facility]AR[facility]19[facility] 14[facility]47:00





             Test Item    Value        Reference Range Interpretation Comments

 

             MCV (test code = MCV) 91.4         80.0-100.0                



[place] 39219-08-06 14:47:00





             Test Item    Value        Reference Range Interpretation Comments

 

             MCH (test code = MCH) 28.8 pg      27.0-33.0                 



[facility] WestZhmbnrvYQHRXVXHYZ8178-91-19 14:47:00





             Test Item    Value        Reference Range Interpretation Comments

 

             MCHC (test code = MCHC) 31.6         32.0-36.0                 



[place] 951190-08-06 14:47:00





             Test Item    Value        Reference Range Interpretation Comments

 

             RDW (test code = RDW) 13.9         11.0-15.0                 



[facility] WestNongazxLZEFLOWMOF0127-45-64 14:47:00





             Test Item    Value        Reference Range Interpretation Comments

 

             Platelet (test code = Platelet) 110          140-400               

    



[facility] WestMgmtokzCNZQBOLEOJ7230-08-94 14:47:00





             Test Item    Value        Reference Range Interpretation Comments

 

             MPV (test code = MPV) 11.7         7.5-12.5                  



[facility] WestPtbutxcYIJECOLEYY0798-20-32 14:47:00





             Test Item    Value        Reference Range Interpretation Comments

 

             Neutrophils # (test code = Neutrophils 5414         4962-6771      

           



             #)                                                  



[facility] WestGwkqqacBVTVJZJWRW2421-77-49 14:47:00





             Test Item    Value        Reference Range Interpretation Comments

 

             Lymphocytes # (test code = Lymphocytes 1152         850-3900       

           



             #)                                                  



[facility] WestUtsbkjiLYRLQWLHIS2536-95-15 14:47:00





             Test Item    Value        Reference Range Interpretation Comments

 

             Monocytes # (test code = Monocytes #) 461          200-950         

          



[facility] WestHsadcdeKSFPHMERFT5201-44-66 14:47:00





             Test Item    Value        Reference Range Interpretation Comments

 

             Eosinophils # (test code = Eosinophils 122                   

           



             #)                                                  



[facility] CampusMlhdoexMOGDRVPBTD6050-33-50 14:47:00





             Test Item    Value        Reference Range Interpretation Comments

 

             Basophils # (test code 50           See_Comment                [Aut

omated message] The



             = Basophils #)                                        system which 

generated



                                                                 this result tra

nsmitted



                                                                 reference range

: <=200.



                                                                 The reference r

cheyenne was



                                                                 not used to int

erpret



                                                                 this result as



                                                                 normal/abnormal

.



[facility]jbaffmYRMUEMLJUR8405-41-61 14:47[facility]00





             Test Item    Value        Reference Range Interpretation Comments

 

             Segs (test code = Segs) 75.2                                   



[facility] CenterTfqtwbpBHSSSELRXL1970-66-75 14:47:00





             Test Item    Value        Reference Range Interpretation Comments

 

             Lymphocytes (test code = Lymphocytes) 16.0                         

          



[facility]vo[facility]KCBLDM1332[facility]61[facility] 14:47:00





             Test Item    Value        Reference Range Interpretation Comments

 

             Monocytes (test code = Monocytes) 6.4                              

      



[facility] CenterLjzrrywCSNYARQRQY7886-68-72 14:47:00





             Test Item    Value        Reference Range Interpretation Comments

 

             Eosinophils (test code = Eosinophils) 1.7                          

          



[facility] CenterLqsiekdYUWKUSEIRB6477-38-64 14:47:00





             Test Item    Value        Reference Range Interpretation Comments

 

             Basophils (test code = Basophils) 0.7                              

      



[facility] [id number] 14:47:00





             Test Item    Value        Reference Range Interpretation Comments

 

             Result 2 (Urine Culture) See Result Comment                        

   



             (test code = Result 2                                        



             (Urine Culture))                                        



[facility] AND [id number] 14:47:00





             Test Item    Value        Reference Range Interpretation Comments

 

             UA Color (test code = UA Color) YELLOW                             

    



[facility] AND [id number] 14:47:00





             Test Item    Value        Reference Range Interpretation Comments

 

             UA Turbidity (test code = UA CLOUDY                                

 



             Turbidity)                                          



[facility] AND [id number] 14:47:00





             Test Item    Value        Reference Range Interpretation Comments

 

             UA Spec Grav (test code = UA Spec 1.017 1      1.001-1.035         

      



             Grav)                                               



[facility] AND [id number] 14:47:00





             Test Item    Value        Reference Range Interpretation Comments

 

             UA pH (test code = UA pH) 5.5 1        5.0-8.0                   



[facility] AND [id number] 14:47:00





             Test Item    Value        Reference Range Interpretation Comments

 

             UA Glucose (test code = UA Glucose) NEGATIVE                       

        



[facility] AND [id number] 14:47:00





             Test Item    Value        Reference Range Interpretation Comments

 

             UA Bili (test code = UA Bili) NEGATIVE                             

  



[facility] [id number] 14:47:00





             Test Item    Value        Reference Range Interpretation Comments

 

             UA Ketones (test code = UA Ketones) NEGATIVE                       

        



[facility] [id number] 14:47:00





             Test Item    Value        Reference Range Interpretation Comments

 

             UA Blood (test code = UA Blood) 1+                                 

    



[facility] [id number] 14:47:00





             Test Item    Value        Reference Range Interpretation Comments

 

             UA Protein (test code = UA Protein) 1+                             

        



[facility] [id number] 14:47:00





             Test Item    Value        Reference Range Interpretation Comments

 

             UA Nitrite (test code = UA Nitrite) POSITIVE                       

        



[facility] [id number] 14:47:00





             Test Item    Value        Reference Range Interpretation Comments

 

             UA Leuk Est (test code = UA Leuk Est) 2+                           

          



[facility] [id number] 14:47:00





             Test Item    Value        Reference Range Interpretation Comments

 

             UA WBC (test code = UA WBC) > OR = 60                              



[facility] [id number] 14:47:00





             Test Item    Value        Reference Range Interpretation Comments

 

             UA RBC (test code = UA RBC) 0-2                                    



[facility] [id number] 14:47:00





             Test Item    Value        Reference Range Interpretation Comments

 

             UA Sq Epi (test code = UA Sq Epi) NONE SEEN                        

      



[facility] [id number] 14:47:00





             Test Item    Value        Reference Range Interpretation Comments

 

             UA Bacteria (test code = UA Bacteria) MANY                         

          



[facility] [id number] 14:47:00





             Test Item    Value        Reference Range Interpretation Comments

 

             UA Hyal Cast (test code = UA Hyal NONE SEEN                        

      



             Cast)                                               



[facility] [id number] 14:47:00





             Test Item    Value        Reference Range Interpretation Comments

 

             UA Reflex (test code CULTURE INDICATED -                           



             = UA Reflex) RESULTS TO FOLLOW                           



[facility] [id number] 14:47:00





             Test Item    Value        Reference Range Interpretation Comments

 

             U Creat mg/dL (test code = U Creat 114                       

       



             mg/dL)                                              



[facility] [id number] 14:47:00





             Test Item    Value        Reference Range Interpretation Comments

 

             U Alb (test code = U Alb) 16.7                                   



[facility] [id number] 14:47:00





             Test Item    Value        Reference Range Interpretation Comments

 

             U Alb/Crea (test code = U Alb/Crea) 146                            

        



[facility] [id number] 14:47:00





             Test Item    Value        Reference Range Interpretation Comments

 

             Vitamin D, 25-OH, Total (test code = 37                      

         



             Vitamin D, 25-OH, Total)                                        



[facility] [id number] 14:47:00





             Test Item    Value        Reference Range Interpretation Comments

 

             U Creat mg/dL (test code = U Creat 114                       

       



             mg/dL)                                              



[facility]2020-08-06 14:47:00





             Test Item    Value        Reference Range Interpretation Comments

 

             U Prot/Creat (test code = U Prot/Creat) 430                  

            



[facility]2020-08-06 14:47:00





             Test Item    Value        Reference Range Interpretation Comments

 

             U Prot/Creat (test code = U 0.430 1      0.021-0.161               



             Prot/Creat)                                         



[facility]2020-08-06 14:47:00





             Test Item    Value        Reference Range Interpretation Comments

 

             U Protein (test code = U Protein) 49           5-24                

      



[place] 501270-08-06 14:47:00





             Test Item    Value        Reference Range Interpretation Comments

 

             Glucose Lvl (test code = Glucose Lvl) 103          65-99           

          



[facility]2020-08-06 14:47:00





             Test Item    Value        Reference Range Interpretation Comments

 

             BUN (test code = BUN) 24           7-25                      



[facility]2020-08-06 14:47:00





             Test Item    Value        Reference Range Interpretation Comments

 

             Creatinine Lvl (test code = Creatinine 1.32         0.50-0.99      

           



             Lvl)                                                



[facility]2020-08-06 14:47:00





             Test Item    Value        Reference Range Interpretation Comments

 

             eGFR NON-AFR. AMERICAN (test code = 43                             

        



             eGFR NON-AFR. AMERICAN)                                        



[facility]2020-08-06 14:47:00





             Test Item    Value        Reference Range Interpretation Comments

 

             eGFR  (test code = eGFR 50                         

            



             )                                        



[place] 501270-08-06 14:47:00





             Test Item    Value        Reference Range Interpretation Comments

 

             B/C Ratio (test code = B/C Ratio) 18           6-22                

      



[place] 501270-08-06 14:47:00





             Test Item    Value        Reference Range Interpretation Comments

 

             Sodium Lvl (test code = Sodium Lvl) 138          135-146           

        



[place] 501270-08-06 14:47:00





             Test Item    Value        Reference Range Interpretation Comments

 

             Potassium Lvl (test code = Potassium 4.4          3.5-5.3          

         



             Lvl)                                                



[place] 501270-08-06 14:47:00





             Test Item    Value        Reference Range Interpretation Comments

 

             Chloride Lvl (test code = Chloride Lvl) 102                  

            



[place] 501270-08-06 14:47:00





             Test Item    Value        Reference Range Interpretation Comments

 

             CO2 (test code = CO2) 30           20-32                     



[place] 501270-08-06 14:47:00





             Test Item    Value        Reference Range Interpretation Comments

 

             Calcium Lvl (test code = Calcium Lvl) 9.4          8.6-10.4        

          



[place] 501270-08-06 14:47:00





             Test Item    Value        Reference Range Interpretation Comments

 

             Phosphorus (test code = Phosphorus) 4.8          2.5-4.5           

        



[place] 501270-08-06 14:47:00





             Test Item    Value        Reference Range Interpretation Comments

 

             Albumin Lvl (test code = Albumin Lvl) 3.9          3.6-5.1         

          



[place] 951190-08-06 14:47:00





             Test Item    Value        Reference Range Interpretation Comments

 

             Plt Count Estimated (test code = DECREASED                         

     



             Plt Count Estimated)                                        



[place] 951190-08-06 14:47:00





             Test Item    Value        Reference Range Interpretation Comments

 

             WBC X 10x3 (test code = WBC X 10x3) 7.2          3.8-10.8          

        



[place] [address]-08-06 14:47:00





             Test Item    Value        Reference Range Interpretation Comments

 

             RBC X 10x6 (test code = RBC X 10x6) 3.71         3.80-5.10         

        



[place] [address]-08-06 14:47:00





             Test Item    Value        Reference Range Interpretation Comments

 

             Hgb (test code = Hgb) 10.7         11.7-15.5                 



[place] [address]-08-06 14:47:00





             Test Item    Value        Reference Range Interpretation Comments

 

             Hct (test code = Hct) 33.9         35.0-45.0                 



[place] [address]-08-06 14:47:00





             Test Item    Value        Reference Range Interpretation Comments

 

             MCV (test code = MCV) 91.4         80.0-100.0                



[facility]FtnwnmpZTESKEDVWV5781-13-01 14:47:00





             Test Item    Value        Reference Range Interpretation Comments

 

             MCH (test code = MCH) 28.8 pg      27.0-33.0                 



[facility] WestYnuycaiTMXYXPNCCZ4014-61-27 14:47:00





             Test Item    Value        Reference Range Interpretation Comments

 

             MCHC (test code = MCHC) 31.6         32.0-36.0                 



[facility] WestFfvqhbjEWCUIXTQCY1127-59-14 14:47:00





             Test Item    Value        Reference Range Interpretation Comments

 

             RDW (test code = RDW) 13.9         11.0-15.0                 



[facility] WestAbjrrfyQVBDJYDIYV2238-24-30 14:47:00





             Test Item    Value        Reference Range Interpretation Comments

 

             Platelet (test code = Platelet) 110          140-400               

    



[facility] WestSjdakknQDPDNTANGG6471-95-96 14:47:00





             Test Item    Value        Reference Range Interpretation Comments

 

             MPV (test code = MPV) 11.7         7.5-12.5                  



[facility] WestXhufirvNSMVCXVDRW9220-23-01 14:47:00





             Test Item    Value        Reference Range Interpretation Comments

 

             Neutrophils # (test code = Neutrophils 5414         9268-3771      

           



             #)                                                  



[facility] WestVvieydgNZSOYZMMMK6953-96-96 14:47:00





             Test Item    Value        Reference Range Interpretation Comments

 

             Lymphocytes # (test code = Lymphocytes 1152         850-3900       

           



             #)                                                  



[facility] WestRbpnjxjYBEQLZWKAN9493-52-72 14:47:00





             Test Item    Value        Reference Range Interpretation Comments

 

             Monocytes # (test code = Monocytes #) 461          200-950         

          



[facility] WestFadsnbmVSGFZYNOXP3844-23-32 14:47:00





             Test Item    Value        Reference Range Interpretation Comments

 

             Eosinophils # (test code = Eosinophils 122                   

           



             #)                                                  



[facility] WestPvkxjdwVYAVTPPZRX0788-23-52 14:47:00





             Test Item    Value        Reference Range Interpretation Comments

 

             Basophils # (test code 50           See_Comment                [Aut

omated message] The



             = Basophils #)                                        system which 

generated



                                                                 this result tra

nsmitted



                                                                 reference range

: <=200.



                                                                 The reference r

cheyenne was



                                                                 not used to int

erpret



                                                                 this result as



                                                                 normal/abnormal

.



[facility] WestLptbwnoGLNXXGMEYR7960-72-07 14:47:00





             Test Item    Value        Reference Range Interpretation Comments

 

             Segs (test code = Segs) 75.2                                   



[facility] WestFlftpwcURHCQAICVR8595-53-05 14:47:00





             Test Item    Value        Reference Range Interpretation Comments

 

             Lymphocytes (test code = Lymphocytes) 16.0                         

          



[facility]wtgcBFLYUWFXAC8247-45-41 14:47:00





             Test Item    Value        Reference Range Interpretation Comments

 

             Monocytes (test code = Monocytes) 6.4                              

      



[facility] MichiganMfrmbucBBOCADNXVM1160-34-15 14:47:00





             Test Item    Value        Reference Range Interpretation Comments

 

             Eosinophils (test code = Eosinophils) 1.7                          

          



[facility]VxjvkkiBULXOGEBGE6670-05-00 14:47:00





             Test Item    Value        Reference Range Interpretation Comments

 

             Basophils (test code = Basophils) 0.7                              

      



[facility] LAB [id number] 14:47:00





             Test Item    Value        Reference Range Interpretation Comments

 

             Result 2 (Urine Culture) See Result Comment                        

   



             (test code = Result 2                                        



             (Urine Culture))                                        



[facility] AND [id number] 14:47:00





             Test Item    Value        Reference Range Interpretation Comments

 

             UA Color (test code = UA Color) YELLOW                             

    



[facility] AND [id number] 14:47:00





             Test Item    Value        Reference Range Interpretation Comments

 

             UA Turbidity (test code = UA CLOUDY                                

 



             Turbidity)                                          



[facility] AND [id number] 14:47:00





             Test Item    Value        Reference Range Interpretation Comments

 

             UA Spec Grav (test code = UA Spec 1.017 1      1.001-1.035         

      



             Grav)                                               



[facility] AND [id number] 14:47:00





             Test Item    Value        Reference Range Interpretation Comments

 

             UA pH (test code = UA pH) 5.5 1        5.0-8.0                   



[facility] AND [id number] 14:47:00





             Test Item    Value        Reference Range Interpretation Comments

 

             UA Glucose (test code = UA Glucose) NEGATIVE                       

        



[facility] AND [id number] 14:47:00





             Test Item    Value        Reference Range Interpretation Comments

 

             UA Bili (test code = UA Bili) NEGATIVE                             

  



[facility] AND [id number] 14:47:00





             Test Item    Value        Reference Range Interpretation Comments

 

             UA Ketones (test code = UA Ketones) NEGATIVE                       

        



[facility] AND [id number] 14:47:00





             Test Item    Value        Reference Range Interpretation Comments

 

             UA Blood (test code = UA Blood) 1+                                 

    



[facility] AND [id number] 14:47:00





             Test Item    Value        Reference Range Interpretation Comments

 

             UA Protein (test code = UA Protein) 1+                             

        



[facility] [id number] 14:47:00





             Test Item    Value        Reference Range Interpretation Comments

 

             UA Nitrite (test code = UA Nitrite) POSITIVE                       

        



[facility] [id number] 14:47:00





             Test Item    Value        Reference Range Interpretation Comments

 

             UA Leuk Est (test code = UA Leuk Est) 2+                           

          



[facility] [id number] 14:47:00





             Test Item    Value        Reference Range Interpretation Comments

 

             UA WBC (test code = UA WBC) > OR = 60                              



[id number]





             Test Item    Value        Reference Range Interpretation Comments

 

             UA RBC (test code = UA RBC) 0-2                                    



[facility] [id number] 14:47:00





             Test Item    Value        Reference Range Interpretation Comments

 

             UA Sq Epi (test code = UA Sq Epi) NONE SEEN                        

      



[facility] [id number] 14:47:00





             Test Item    Value        Reference Range Interpretation Comments

 

             UA Bacteria (test code = UA Bacteria) MANY                         

          



[facility] [id number] 14:47:00





             Test Item    Value        Reference Range Interpretation Comments

 

             UA Hyal Cast (test code = UA Hyal NONE SEEN                        

      



             Cast)                                               



[facility] AND [id number] 14:47:00





             Test Item    Value        Reference Range Interpretation Comments

 

             UA Reflex (test code CULTURE INDICATED -                           



             = UA Reflex) RESULTS TO FOLLOW                           



[facility] [id number] 14:47:00





             Test Item    Value        Reference Range Interpretation Comments

 

             U Creat mg/dL (test code = U Creat 114                       

       



             mg/dL)                                              



[facility] [id number] 14:47:00





             Test Item    Value        Reference Range Interpretation Comments

 

             U Alb (test code = U Alb) 16.7                                   



[facility] [id number] 14:47:00





             Test Item    Value        Reference Range Interpretation Comments

 

             U Alb/Crea (test code = U Alb/Crea) 146                            

        



[id number]





             Test Item    Value        Reference Range Interpretation Comments

 

             Vitamin D, 25-OH, Total (test code = 37                      

         



             Vitamin D, 25-OH, Total)                                        



[id number] 14:47:00





             Test Item    Value        Reference Range Interpretation Comments

 

             U Creat mg/dL (test code = U Creat 114                       

       



             mg/dL)                                              



[facility]2020-08-06 14:47:00





             Test Item    Value        Reference Range Interpretation Comments

 

             U Prot/Creat (test code = U Prot/Creat) 430                  

            



[place]270-08-06 14:47:00





             Test Item    Value        Reference Range Interpretation Comments

 

             U Prot/Creat (test code = U 0.430 1      0.021-0.161               



             Prot/Creat)                                         



[place] 501270-08-06 14:47:00





             Test Item    Value        Reference Range Interpretation Comments

 

             U Protein (test code = U Protein) 49           5-24                

      



[place] 501270-08-06 14:47:00





             Test Item    Value        Reference Range Interpretation Comments

 

             Glucose Lvl (test code = Glucose Lvl) 103          65-99           

          



[place] 501270-08-06 14:47:00





             Test Item    Value        Reference Range Interpretation Comments

 

             BUN (test code = BUN) 24           7-25                      



[place] 501270-08-06 14:47:00





             Test Item    Value        Reference Range Interpretation Comments

 

             Creatinine Lvl (test code = Creatinine 1.32         0.50-0.99      

           



             Lvl)                                                



[place] 501270-08-06 14:47:00





             Test Item    Value        Reference Range Interpretation Comments

 

             eGFR NON-AFR. AMERICAN (test code = 43                             

        



             eGFR NON-AFR. AMERICAN)                                        



[facility]2020-08-06 14:47:00





             Test Item    Value        Reference Range Interpretation Comments

 

             eGFR  (test code = eGFR 50                         

            



             )                                        



[place] 501270-08-06 14:47:00





             Test Item    Value        Reference Range Interpretation Comments

 

             B/C Ratio (test code = B/C Ratio) 18           6-22                

      



[place] 501270-08-06 14:47:00





             Test Item    Value        Reference Range Interpretation Comments

 

             Sodium Lvl (test code = Sodium Lvl) 138          135-146           

        



[place] 501270-08-06 14:47:00





             Test Item    Value        Reference Range Interpretation Comments

 

             Potassium Lvl (test code = Potassium 4.4          3.5-5.3          

         



             Lvl)                                                



[place] 501270-08-06 14:47:00





             Test Item    Value        Reference Range Interpretation Comments

 

             Chloride Lvl (test code = Chloride Lvl) 102                  

            



[place] 501270-08-06 14:47:00





             Test Item    Value        Reference Range Interpretation Comments

 

             CO2 (test code = CO2) 30           20-32                     



[facility]2020-08-06 14:47:00





             Test Item    Value        Reference Range Interpretation Comments

 

             Calcium Lvl (test code = Calcium Lvl) 9.4          8.6-10.4        

          



[place] 501270-08-06 14:47:00





             Test Item    Value        Reference Range Interpretation Comments

 

             Phosphorus (test code = Phosphorus) 4.8          2.5-4.5           

        



[place] 501270-08-06 14:47:00





             Test Item    Value        Reference Range Interpretation Comments

 

             Albumin Lvl (test code = Albumin Lvl) 3.9          3.6-5.1         

          



[place] 951190-08-06 14:47:00





             Test Item    Value        Reference Range Interpretation Comments

 

             Plt Count Estimated (test code = DECREASED                         

     



             Plt Count Estimated)                                        



[place] 951190-08-06 14:47:00





             Test Item    Value        Reference Range Interpretation Comments

 

             WBC X 10x3 (test code = WBC X 10x3) 7.2          3.8-10.8          

        



[place] 951190-08-06 14:47:00





             Test Item    Value        Reference Range Interpretation Comments

 

             RBC X 10x6 (test code = RBC X 10x6) 3.71         3.80-5.10         

        



[place] 951190-08-06 14:47:00





             Test Item    Value        Reference Range Interpretation Comments

 

             Hgb (test code = Hgb) 10.7         11.7-15.5                 



[id number]





             Test Item    Value        Reference Range Interpretation Comments

 

             Hct (test code = Hct) 33.9         35.0-45.0                 



[place] 951190-08-06 14:47:00





             Test Item    Value        Reference Range Interpretation Comments

 

             MCV (test code = MCV) 91.4         80.0-100.0                



[place] 951190-08-06 14:47:00





             Test Item    Value        Reference Range Interpretation Comments

 

             MCH (test code = MCH) 28.8 pg      27.0-33.0                 



[place] 951190-08-06 14:47:00





             Test Item    Value        Reference Range Interpretation Comments

 

             MCHC (test code = MCHC) 31.6         32.0-36.0                 



[place] 951190-08-06 14:47:00





             Test Item    Value        Reference Range Interpretation Comments

 

             RDW (test code = RDW) 13.9         11.0-15.0                 



[id number] 14:47:00





             Test Item    Value        Reference Range Interpretation Comments

 

             Platelet (test code = Platelet) 110          140-400               

    



[facility] [id number] 14:47:00





             Test Item    Value        Reference Range Interpretation Comments

 

             MPV (test code = MPV) 11.7         7.5-12.5                  



[facility] [id number] 14:47:00





             Test Item    Value        Reference Range Interpretation Comments

 

             Neutrophils # (test code = Neutrophils 5414         2696-8005      

           



             #)                                                  



[facility] WestYipndqyZJCCZIKTJT2581-22-40 14:47:00





             Test Item    Value        Reference Range Interpretation Comments

 

             Lymphocytes # (test code = Lymphocytes 1152         850-3900       

           



             #)                                                  



[facility]EvfgmmjQVFUEQHNAN4791-70-58 14:47:00





             Test Item    Value        Reference Range Interpretation Comments

 

             Monocytes # (test code = Monocytes #) 461          200-950         

          



[facility] WestKmzulaoKHQCNQGZMA8074-64-75 14:47:00





             Test Item    Value        Reference Range Interpretation Comments

 

             Eosinophils # (test code = Eosinophils 122                   

           



             #)                                                  



[facility] [id number] 14:47:00





             Test Item    Value        Reference Range Interpretation Comments

 

             Basophils # (test code 50           See_Comment                [Aut

omated message] The



             = Basophils #)                                        system which 

generated



                                                                 this result tra

nsmitted



                                                                 reference range

: <=200.



                                                                 The reference r

cheyenne was



                                                                 not used to int

erpret



                                                                 this result as



                                                                 normal/abnormal

.



[facility] [id number] 14:47:00





             Test Item    Value        Reference Range Interpretation Comments

 

             Segs (test code = Segs) 75.2                                   



[facility] WestGodkkhySOMDGXJAXH5173-18-90 14:47:00





             Test Item    Value        Reference Range Interpretation Comments

 

             Lymphocytes (test code = Lymphocytes) 16.0                         

          



[facility] WestHqowoalTGYEPEAWTM6548-51-57 14:47:00





             Test Item    Value        Reference Range Interpretation Comments

 

             Monocytes (test code = Monocytes) 6.4                              

      



[facility] WestVfhpbhtBUBUVLNJZB9317-07-41 14:47:00





             Test Item    Value        Reference Range Interpretation Comments

 

             Eosinophils (test code = Eosinophils) 1.7                          

          



[facility] WestSrwfgznCJJTKLXCKM7416-65-74 14:47:00





             Test Item    Value        Reference Range Interpretation Comments

 

             Basophils (test code = Basophils) 0.7                              

      



[facility] [id number] 14[facility]47:00





             Test Item    Value        Reference Range Interpretation Comments

 

             Result 2 (Urine Culture) See Result Comment                        

   



             (test code = Result 2                                        



             (Urine Culture))                                        



[facility] AND [id number] 14:47:00





             Test Item    Value        Reference Range Interpretation Comments

 

             UA Color (test code = UA Color) YELLOW                             

    



[facility] AND [id number] 14:47:00





             Test Item    Value        Reference Range Interpretation Comments

 

             UA Turbidity (test code = UA CLOUDY                                

 



             Turbidity)                                          



[facility] AND [id number] 14:47:00





             Test Item    Value        Reference Range Interpretation Comments

 

             UA Spec Grav (test code = UA Spec 1.017 1      1.001-1.035         

      



             Grav)                                               



[facility] AND [id number] 14:47:00





             Test Item    Value        Reference Range Interpretation Comments

 

             UA pH (test code = UA pH) 5.5 1        5.0-8.0                   



[facility] AND [id number] 14:47:00





             Test Item    Value        Reference Range Interpretation Comments

 

             UA Glucose (test code = UA Glucose) NEGATIVE                       

        



[facility] AND [id number] 14:47:00





             Test Item    Value        Reference Range Interpretation Comments

 

             UA Bili (test code = UA Bili) NEGATIVE                             

  



[facility] [id number] 14:47:00





             Test Item    Value        Reference Range Interpretation Comments

 

             UA Ketones (test code = UA Ketones) NEGATIVE                       

        



[facility] AND [id number] 14:47:00





             Test Item    Value        Reference Range Interpretation Comments

 

             UA Blood (test code = UA Blood) 1+                                 

    



[facility] AND [id number] 14:47:00





             Test Item    Value        Reference Range Interpretation Comments

 

             UA Protein (test code = UA Protein) 1+                             

        



[facility] [id number] 14:47:00





             Test Item    Value        Reference Range Interpretation Comments

 

             UA Nitrite (test code = UA Nitrite) POSITIVE                       

        



[facility] AND [id number] 14:47:00





             Test Item    Value        Reference Range Interpretation Comments

 

             UA Leuk Est (test code = UA Leuk Est) 2+                           

          



[facility] [id number] 14:47:00





             Test Item    Value        Reference Range Interpretation Comments

 

             UA WBC (test code = UA WBC) > OR = 60                              



[facility] [id number] 14:47:00





             Test Item    Value        Reference Range Interpretation Comments

 

             UA RBC (test code = UA RBC) 0-2                                    



[facility] AND [id number] 14:47:00





             Test Item    Value        Reference Range Interpretation Comments

 

             UA Sq Epi (test code = UA Sq Epi) NONE SEEN                        

      



[facility] AND [id number] 14:47:00





             Test Item    Value        Reference Range Interpretation Comments

 

             UA Bacteria (test code = UA Bacteria) MANY                         

          



[facility] AND [id number] 14:47:00





             Test Item    Value        Reference Range Interpretation Comments

 

             UA Hyal Cast (test code = UA Hyal NONE SEEN                        

      



             Cast)                                               



[facility] AND [id number] 14:47:00





             Test Item    Value        Reference Range Interpretation Comments

 

             UA Reflex (test code CULTURE INDICATED -                           



             = UA Reflex) RESULTS TO FOLLOW                           



[facility]2020-08-06 14:47:00





             Test Item    Value        Reference Range Interpretation Comments

 

             U Creat mg/dL (test code = U Creat 114                       

       



             mg/dL)                                              



[facility]2020-08-06 14:47:00





             Test Item    Value        Reference Range Interpretation Comments

 

             U Alb (test code = U Alb) 16.7                                   



[facility]2020-08-06 14:47:00





             Test Item    Value        Reference Range Interpretation Comments

 

             U Alb/Crea (test code = U Alb/Crea) 146                            

        



[facility]2020-08-06 14:47:00





             Test Item    Value        Reference Range Interpretation Comments

 

             Vitamin D, 25-OH, Total (test code = 37                      

         



             Vitamin D, 25-OH, Total)                                        



[facility]2020-08-06 14:47:00





             Test Item    Value        Reference Range Interpretation Comments

 

             U Creat mg/dL (test code = U Creat 114                       

       



             mg/dL)                                              



[facility]2020-08-06 14:47:00





             Test Item    Value        Reference Range Interpretation Comments

 

             U Prot/Creat (test code = U Prot/Creat) 430                  

            



[facility]2020-08-06 14:47:00





             Test Item    Value        Reference Range Interpretation Comments

 

             U Prot/Creat (test code = U 0.430 1      0.021-0.161               



             Prot/Creat)                                         



[facility]2020-08-06 14:47:00





             Test Item    Value        Reference Range Interpretation Comments

 

             U Protein (test code = U Protein) 49           5-24                

      



[place] 501270-08-06 14:47:00





             Test Item    Value        Reference Range Interpretation Comments

 

             Glucose Lvl (test code = Glucose Lvl) 103          65-99           

          



[place] 501270-08-06 14:47:00





             Test Item    Value        Reference Range Interpretation Comments

 

             BUN (test code = BUN) 24           7-25                      



[place] 501270-08-06 14:47:00





             Test Item    Value        Reference Range Interpretation Comments

 

             Creatinine Lvl (test code = Creatinine 1.32         0.50-0.99      

           



             Lvl)                                                



[place] [address]-08-06 14:47:00





             Test Item    Value        Reference Range Interpretation Comments

 

             eGFR NON-AFR. AMERICAN (test code = 43                             

        



             eGFR NON-AFR. AMERICAN)                                        



[place] 501270-08-06 14:47:00





             Test Item    Value        Reference Range Interpretation Comments

 

             eGFR  (test code = eGFR 50                         

            



             )                                        



[place] [address]-08-06 14:47:00





             Test Item    Value        Reference Range Interpretation Comments

 

             B/C Ratio (test code = B/C Ratio) 18           6-22                

      



[place] 501270-08-06 14:47:00





             Test Item    Value        Reference Range Interpretation Comments

 

             Sodium Lvl (test code = Sodium Lvl) 138          135-146           

        



[place] 501270-08-06 14:47:00





             Test Item    Value        Reference Range Interpretation Comments

 

             Potassium Lvl (test code = Potassium 4.4          3.5-5.3          

         



             Lvl)                                                



[facility]2020-08-06 14:47:00





             Test Item    Value        Reference Range Interpretation Comments

 

             Chloride Lvl (test code = Chloride Lvl) 102                  

            



[place] 501270-08-06 14:47:00





             Test Item    Value        Reference Range Interpretation Comments

 

             CO2 (test code = CO2) 30           20-32                     



[place] 501270-08-06 14:47:00





             Test Item    Value        Reference Range Interpretation Comments

 

             Calcium Lvl (test code = Calcium Lvl) 9.4          8.6-10.4        

          



[place] 501270-08-06 14:47:00





             Test Item    Value        Reference Range Interpretation Comments

 

             Phosphorus (test code = Phosphorus) 4.8          2.5-4.5           

        



[place] 501270-08-06 14:47:00





             Test Item    Value        Reference Range Interpretation Comments

 

             Albumin Lvl (test code = Albumin Lvl) 3.9          3.6-5.1         

          



[place] 951190-08-06 14:47:00





             Test Item    Value        Reference Range Interpretation Comments

 

             Plt Count Estimated (test code = DECREASED                         

     



             Plt Count Estimated)                                        



[id number] 14:47:00





             Test Item    Value        Reference Range Interpretation Comments

 

             WBC X 10x3 (test code = WBC X 10x3) 7.2          3.8-10.8          

        



[place] 951190-08-06 14:47:00





             Test Item    Value        Reference Range Interpretation Comments

 

             RBC X 10x6 (test code = RBC X 10x6) 3.71         3.80-5.10         

        



[place] 951190-08-06 14:47:00





             Test Item    Value        Reference Range Interpretation Comments

 

             Hgb (test code = Hgb) 10.7         11.7-15.5                 



[place] [address]-08-06 14:47:00





             Test Item    Value        Reference Range Interpretation Comments

 

             Hct (test code = Hct) 33.9         35.0-45.0                 



[place] 951190-08-06 14:47:00





             Test Item    Value        Reference Range Interpretation Comments

 

             MCV (test code = MCV) 91.4         80.0-100.0                



[place] 951190-08-06 14:47:00





             Test Item    Value        Reference Range Interpretation Comments

 

             MCH (test code = MCH) 28.8 pg      27.0-33.0                 



[place] 951190-08-06 14:47:00





             Test Item    Value        Reference Range Interpretation Comments

 

             MCHC (test code = MCHC) 31.6         32.0-36.0                 



[place] 951190-08-06 14:47:00





             Test Item    Value        Reference Range Interpretation Comments

 

             RDW (test code = RDW) 13.9         11.0-15.0                 



[place] [id number] 14:47:00





             Test Item    Value        Reference Range Interpretation Comments

 

             Platelet (test code = Platelet) 110          140-400               

    



[place] 951190-08-06 14:47:00





             Test Item    Value        Reference Range Interpretation Comments

 

             MPV (test code = MPV) 11.7         7.5-12.5                  



[place] 951190-08-06 14:47:00





             Test Item    Value        Reference Range Interpretation Comments

 

             Neutrophils # (test code = Neutrophils 5414         6030-6499      

           



             #)                                                  



[facility]tBKXAC[facility]OQ1746[facility]-54 14[facility]47:00





             Test Item    Value        Reference Range Interpretation Comments

 

             Lymphocytes # (test code = Lymphocytes 1152         850-3900       

           



             #)                                                  



[facility]3163[facility] 14:47:00





             Test Item    Value        Reference Range Interpretation Comments

 

             Monocytes # (test code = Monocytes #) 461          200-950         

          



[facility] CenterGqjzljcLKKRZWPVFZ2530-74-34 14:47:00





             Test Item    Value        Reference Range Interpretation Comments

 

             Eosinophils # (test code = Eosinophils 122                   

           



             #)                                                  



[facility]UfzhlaeJNYUAOYOBW9165-56-52 14:47:00





             Test Item    Value        Reference Range Interpretation Comments

 

             Basophils # (test code 50           See_Comment                [Aut

omated message] The



             = Basophils #)                                        system which 

generated



                                                                 this result tra

nsmitted



                                                                 reference range

: <=200.



                                                                 The reference r

cheyenne was



                                                                 not used to int

erpret



                                                                 this result as



                                                                 normal/abnormal

.



[facility] MichiganNjsztpiUSVJTFXNML5374-33-07 14:47:00





             Test Item    Value        Reference Range Interpretation Comments

 

             Segs (test code = Segs) 75.2                                   



[facility]ck[facility]OP[facility]APN6481[facility]78[facility]95 14:47:00





             Test Item    Value        Reference Range Interpretation Comments

 

             Lymphocytes (test code = Lymphocytes) 16.0                         

          



[facility] MichiganNmuwuvhLIAFANBMTT8837-48-00 14:47:00





             Test Item    Value        Reference Range Interpretation Comments

 

             Monocytes (test code = Monocytes) 6.4                              

      



[facility] MichiganImceaheIAGEFGIHSY2687-29-82 14:47:00





             Test Item    Value        Reference Range Interpretation Comments

 

             Eosinophils (test code = Eosinophils) 1.7                          

          



[facility] CenterCraxrmnJFKMQOBIST2935-88-91 14:47:00





             Test Item    Value        Reference Range Interpretation Comments

 

             Basophils (test code = Basophils) 0.7                              

      



[facility] [id number] 14:47:00





             Test Item    Value        Reference Range Interpretation Comments

 

             Result 2 (Urine Culture) See Result Comment                        

   



             (test code = Result 2                                        



             (Urine Culture))                                        



[facility] AND [id number] 14:47:00





             Test Item    Value        Reference Range Interpretation Comments

 

             UA Color (test code = UA Color) YELLOW                             

    



[facility] AND [id number] 14:47:00





             Test Item    Value        Reference Range Interpretation Comments

 

             UA Turbidity (test code = UA CLOUDY                                

 



             Turbidity)                                          



[facility] AND [id number] 14:47:00





             Test Item    Value        Reference Range Interpretation Comments

 

             UA Spec Grav (test code = UA Spec 1.017 1      1.001-1.035         

      



             Grav)                                               



[facility] [id number] 14:47:00





             Test Item    Value        Reference Range Interpretation Comments

 

             UA pH (test code = UA pH) 5.5 1        5.0-8.0                   



[facility] [id number] 14:47:00





             Test Item    Value        Reference Range Interpretation Comments

 

             UA Glucose (test code = UA Glucose) NEGATIVE                       

        



[facility] [id number] 14:47:00





             Test Item    Value        Reference Range Interpretation Comments

 

             UA Bili (test code = UA Bili) NEGATIVE                             

  



[facility] [id number] 14:47:00





             Test Item    Value        Reference Range Interpretation Comments

 

             UA Ketones (test code = UA Ketones) NEGATIVE                       

        



[facility] [id number] 14:47:00





             Test Item    Value        Reference Range Interpretation Comments

 

             UA Blood (test code = UA Blood) 1+                                 

    



[facility] [id number] 14:47:00





             Test Item    Value        Reference Range Interpretation Comments

 

             UA Protein (test code = UA Protein) 1+                             

        



[facility] [id number] 14:47:00





             Test Item    Value        Reference Range Interpretation Comments

 

             UA Nitrite (test code = UA Nitrite) POSITIVE                       

        



[facility] [id number] 14:47:00





             Test Item    Value        Reference Range Interpretation Comments

 

             UA Leuk Est (test code = UA Leuk Est) 2+                           

          



[facility] [id number] 14:47:00





             Test Item    Value        Reference Range Interpretation Comments

 

             UA WBC (test code = UA WBC) > OR = 60                              



[facility] [id number] 14:47:00





             Test Item    Value        Reference Range Interpretation Comments

 

             UA RBC (test code = UA RBC) 0-2                                    



[facility] [id number] 14:47:00





             Test Item    Value        Reference Range Interpretation Comments

 

             UA Sq Epi (test code = UA Sq Epi) NONE SEEN                        

      



[facility] [id number] 14:47:00





             Test Item    Value        Reference Range Interpretation Comments

 

             UA Bacteria (test code = UA Bacteria) MANY                         

          



[facility] [id number] 14:47:00





             Test Item    Value        Reference Range Interpretation Comments

 

             UA Hyal Cast (test code = UA Hyal NONE SEEN                        

      



             Cast)                                               



[facility] [id number] 14:47:00





             Test Item    Value        Reference Range Interpretation Comments

 

             UA Reflex (test code CULTURE INDICATED -                           



             = UA Reflex) RESULTS TO FOLLOW                           



[facility] [id number] 14:47:00





             Test Item    Value        Reference Range Interpretation Comments

 

             U Creat mg/dL (test code = U Creat 114                       

       



             mg/dL)                                              



[facility] [id number] 14:47:00





             Test Item    Value        Reference Range Interpretation Comments

 

             U Alb (test code = U Alb) 16.7                                   



[facility] [id number] 14:47:00





             Test Item    Value        Reference Range Interpretation Comments

 

             U Alb/Crea (test code = U Alb/Crea) 146                            

        



[facility] PROTEIN ELECTROPHORESIS-24HR [id number] 08:01:00





             Test Item    Value        Reference Range Interpretation Comments

 

             CREATININE, 24 HOUR 1.45 g/24 h  0.50-2.15                 



             URINE (test code =                                        



             61598427)                                           

 

             PROTEIN/CREATININE 292 mg/g creat < OR = 114   H            



             RATION (test code =                                        



             32105728)                                           

 

             PROTEIN, TOTAL 24 HR  mg/24 h  <150         H            TEST

 PERFORMED



             (test code = 57304515)                                        AT:[facility]

[facility]



                                                                 [facility]

IN



                                                                 [address]



                                                                 [id number] FELIX MD

 

             ALBUMIN (test code = 35 %                                   



             67199109)                                           

 

             ALPHA-1-GLOBULINS (test 10 %                                   



             code = 07448203)                                        

 

             ALPHA-2-GLOBULINS (test 12 %                                   



             code = 20314503)                                        

 

             BETA GLOBULINS (test 25 %                                   



             code = 56668465)                                        

 

             GAMMA GLOBULINS (test 18 %                                   



             code = 83773611)                                        

 

             INTERPRETATION (test                                        Albumin

 and



             code = 82819920)                                        various aba

bulin



                                                                 fractions



                                                                 detected on



                                                                 proteinelectrop

ho



                                                                 resis. No



                                                                 abnormal protei

n



                                                                 bands



                                                                 (Bence-Jonespro

te



                                                                 inuria)



                                                                 detected.TEST



                                                                 PERFORMED



                                                                 AT:[facility]

IN



                                                                 [address]



                                                                 [address]XKLKLELVIRA FELIX MD



NEUTROPHIL CYTOPLASMIC KP--[id number] 05:19:00





             Test Item    Value        Reference Range Interpretation Comments

 

             ANCA SCREEN (test NEGATIVE     NEGATIVE                  ANCA Scree

n includes



             code = 31776944)                                        evaluation 

for p-ANCA,



                                                                 c-ANCA andatypi

tayla p-ANCA.



                                                                 A positive ANCA

 screen



                                                                 reflexes to tit

erand



                                                                 pattern(s), e.g

.,



                                                                 cytoplasmic pat

tern



                                                                 (c-ANCA),perinu

clear



                                                                 pattern (p-ANCA

), or



                                                                 atypical p-ANCA



                                                                 pattern.c-ANCA 

and p-ANCA



                                                                 are observed in

 vasculitis,



                                                                 whereasatypical

 p-ANCA is



                                                                 observed in IBD



                                                                 (Inflammatory



                                                                 BowelDisease). 

Atypical



                                                                 p-ANCA is detec

kolby in about



                                                                 55% to 80% ofpa

tients with



                                                                 ulcerative coli

tis but only



                                                                 5% to 25% ofpat

ients with



                                                                 Crohn's disease

.TEST



                                                                 PERFORMED AT:

[facility]



                                                                 [facility]NO[facility]



                                                                 [id number] Critical access [place]SC[place], [place]



                                                                 [id number] MARIEE MD,PHD

,RAMILA



COMPREHENSIVE METABOLIC [id number] 06:25:00





             Test Item    Value        Reference Range Interpretation Comments

 

             GLUCOSE (test code = 06D) 115 mg/dL           H            

 

             SODIUM (test code = 01A) 137 mmol/L   136-145                   

 

             POTASSIUM (test code = 01B) 3.3 mmol/L   3.6-5.1      L            

 

             CHLORIDE (test code = 04A) 97 mmol/L           L            

 

             CO2 (test code = 02A) 32 mmol/L    22-32                     

 

             ANION GAP (test code = ANG) 11.3 mmol/L                            

 

             BUN (test code = 05D) 36 mg/dL     7-18         H            

 

             CREATININE (test code = 03E) 1.4 mg/dL    0.4-1.1      H           

 

 

             BUN/CREA (test code = BCR) 25           12-20        H            

 

             CALCIUM (test code = 09D) 8.8 mg/dL    8.3-9.5                   

 

             BILI TOTAL (test code = 11A) 1.2 mg/dL    0.2-1.0      H           

 

 

             PROTEIN (test code = 07D) 6.5 g/dL     6.4-8.2                   

 

             ALBUMIN (test code = 08D) 2.9 g/dL     3.5-4.8      L            

 

             GLOBULIN (test code = GLB) 3.6 g/dL     1.5-3.8                   

 

             ALB/GLOB (test code = AGRR) 0.8          1.0-2.6      L            

 

             ALK PHOS (test code = 35A) 97 IU/L                          

 

             AST (test code = 30A) 15 IU/L      <=42                      

 

             ALT (test code = 31A) 35 IU/L      <=78                      



CBC (INCLUDES AUTOMATED DIFFERENTIAL)2019 06:06:00





             Test Item    Value        Reference Range Interpretation Comments

 

             WBC (test code = WBC) 6.9 10\S\3/uL 4.5-11.0                  

 

             RBC (test code = RBC) 3.56 10\S\6/uL 4.20-5.60    L            

 

             HGB (test code = HBG) 10.4 g/dL    12.0-15.5    L            

 

             HCT (test code = HCT) 32.1 %       35.0-44.0    L            

 

             MCV (test code = MCV) 90.2 fL      81.0-99.0                 

 

             MCH (test code = MCH) 29.2 pg      27.0-31.0                 

 

             MCHC (test code = MCHC) 32.4 g/dL    32.0-36.0                 

 

             RDW (test code = RDW) 15.0 %       11.5-14.5    H            

 

             PLT (test code = PLT) 158 10\S\3/uL 130-400                   

 

             MPV (test code = MPV) 10.7 fL      9.4-12.4                  

 

             NEUTROP # (test code = NE#) 4.4 10\S\3/uL 1.6-8.0                  

 

 

             LYMPH # (test code = LY#) 1.8 10\S\3/uL 1.1-3.5                   

 

             MONOCYTE # (test code = MO#) 0.5 10\S\3/uL 0.0-1.1                 

  

 

             EOSINOPH # (test code = EO#) 0.2 10\S\3/uL 0.0-0.7                 

  

 

             BASOPHIL # (test code = BA#) 0.0 10\S\3/uL 0.0-0.3                 

  

 

             IG # (test code = IG#) 0.03 10\S\3/uL 0.00-0.06                 

 

             NRBC # (test code = NRBC#) 0.00 10\S\3/uL 0.00-0.01                

 

 

             NEUTROPH % (test code = NE%) 64.0 %       35.0-73.0                

 

 

             LYMPH % (test code = LY%) 26.1 %       20.0-55.0                 

 

             MONO % (test code = MO%) 6.5 %        2.5-10.0                  

 

             EOSINOPH % (test code = EO%) 2.6 %        0.0-5.0                  

 

 

             BASOPHIL % (test code = BA%) 0.4 %        0.0-2.0                  

 

 

             IG % (test code = IG%) 0.4 %        0.0-0.8                   

 

             NRBC% (test code = NRBC%) 0.0 %        0.0-0.2                   

 

             MANDIFF (test code = MDIFF) NO           NO                        

 

             RBC MORPH (test code = RBCMOR)              NORMAL                 

   



URINE [id number] 09:53:00





             Test Item    Value        Reference Range Interpretation Comments

 

             Isolate 1 (test code = Proteus mirabilis              A            



             ISO1)                                               

 

             ampicillin (test code = am)  ug/mL                    S            

 

             ampicillin/sulbactam (test  ug/mL                    S            



             code = ams)                                         

 

             piperacillin/tazobactam  ug/mL                    S            



             (test code = tzp)                                        

 

             ceftazidime (test code =  ug/mL                    S            



             jeannine)                                                

 

             ceftriaxone1 (test code =  ug/mL                    S            



             ctr)                                                

 

             cefepime (test code = fep)  ug/mL                    S            

 

             aztreonam (test code = azm)  ug/mL                    S            

 

             ertapenem (test code = etp)  ug/mL                    S            

 

             meropenem (test code = mem)  ug/mL                    S            

 

             gentamicin (test code = gm)  ug/mL                    S            

 

             tobramycin (test code =  ug/mL                    S            



             tob)                                                

 

             levofloxacin (test code =  ug/mL                    S            



             lev)                                                

 

             trimethoprim/sulfamethoxazo  ug/mL                    S            



             le (test code = sxt)                                        



PARTHYROID HORMONE (INTACT)2019 08:24:00





             Test Item    Value        Reference Range Interpretation Comments

 

             PTH INTACT (test code 166.6 pg/mL  18.4-80.1    H            



             = A85)                                              

 

             Ref Range Change ***** Please note the                           



             (test code = REF change in reference                           



             RANGE)       range ***                              



VITAMIN D TOTAL 25 (OH)2019 07:15:00





             Test Item    Value        Reference Range Interpretation Comments

 

             VITAMIN D 25(OH) (test code = VD) 36.0 ng/mL   30.0-100.0          

      



SERUM PROTEIN [id number] 06:20:00





             Test Item    Value        Reference Range Interpretation Comments

 

             PROTEIN TOTAL (test 5.7 g/dL     6.1-8.1      L            TEST PER

FORMED AT:[facility]



             code = 36493750)                                        [facility]

[id number]



                                                                 [facility]

[place], [place]



                                                                 [id number] FELIX MD

 

             ALBUMIN (test code = 3.2 g/dL     3.8-4.8      L            



             18565057)                                           

 

             ALPHA-1-GLOBULINS 0.4 g/dL     0.2-0.3      H            



             (test code =                                        



             30804763)                                           

 

             ALPHA-2-GLOBULINS 0.8 g/dL     0.5-0.9                   



             (test code =                                        



             43516887)                                           

 

             BETA 1 GLOBULIN (test 0.5 g/dL     0.4-0.6                   



             code = 60931706)                                        

 

             BETA 2 GLOBULIN (test 0.2 g/dL     0.2-0.5                   



             code = 90045580)                                        

 

             GAMMA GLOBULINS (test 0.6 g/dL     0.8-1.7      L            



             code = 31972197)                                        

 

             INTERPRETATION (test                                        Pattern

 consistent with



             code = 90823786)                                        an acute ph

ase



                                                                 reactionConsist

ent with



                                                                 hypogammaglobul

inemia.



                                                                 Serum free ligh

tchains



                                                                 or urine immuno

fixation



                                                                 should be consi

dered



                                                                 ifplasma cell d

yscrasias



                                                                 are a possible



                                                                 clinicaldiagnos

is.TEST



                                                                 PERFORMED AT:[facility]

[facility]



                                                                 [facility]

[id number]



                                                                 [facility] [place]



                                                                 [id number] FELIX MD



[id number] 06:13:00





             Test Item    Value        Reference Range Interpretation Comments

 

             MAGNESIUM (test code = 48A) 1.7 mg/dL    1.8-2.4      L            



COMPREHENSIVE METABOLIC [id number] 06:13:00





             Test Item    Value        Reference Range Interpretation Comments

 

             GLUCOSE (test code = 06D) 110 mg/dL           H            

 

             SODIUM (test code = 01A) 140 mmol/L   136-145                   

 

             POTASSIUM (test code = 01B) 3.1 mmol/L   3.6-5.1      L            

 

             CHLORIDE (test code = 04A) 96 mmol/L           L            

 

             CO2 (test code = 02A) 34 mmol/L    22-32        H            

 

             ANION GAP (test code = ANG) 13.1 mmol/L                            

 

             BUN (test code = 05D) 33 mg/dL     7-18         H            

 

             CREATININE (test code = 03E) 1.5 mg/dL    0.4-1.1      H           

 

 

             BUN/CREA (test code = BCR) 22           12-20        H            

 

             CALCIUM (test code = 09D) 9.1 mg/dL    8.3-9.5                   

 

             BILI TOTAL (test code = 11A) 1.4 mg/dL    0.2-1.0      H           

 

 

             PROTEIN (test code = 07D) 7.0 g/dL     6.4-8.2                   

 

             ALBUMIN (test code = 08D) 3.2 g/dL     3.5-4.8      L            

 

             GLOBULIN (test code = GLB) 3.8 g/dL     1.5-3.8                   

 

             ALB/GLOB (test code = AGRR) 0.8          1.0-2.6      L            

 

             ALK PHOS (test code = 35A) 111 IU/L                         

 

             AST (test code = 30A) 18 IU/L      <=42                      

 

             ALT (test code = 31A) 43 IU/L      <=78                      



PHOSPHORUS (P04)2019 06:13:00





             Test Item    Value        Reference Range Interpretation Comments

 

             PHOSPHORUS (test code = 43D) 4.0 mg/dL    2.7-4.6                  

 



CBC (INCLUDES AUTOMATED DIFFERENTIAL)2019 05:48:00





             Test Item    Value        Reference Range Interpretation Comments

 

             WBC (test code = WBC) 9.4 10\S\3/uL 4.5-11.0                  

 

             RBC (test code = RBC) 3.73 10\S\6/uL 4.20-5.60    L            

 

             HGB (test code = HBG) 10.8 g/dL    12.0-15.5    L            

 

             HCT (test code = HCT) 33.8 %       35.0-44.0    L            

 

             MCV (test code = MCV) 90.6 fL      81.0-99.0                 

 

             MCH (test code = MCH) 29.0 pg      27.0-31.0                 

 

             MCHC (test code = MCHC) 32.0 g/dL    32.0-36.0                 

 

             RDW (test code = RDW) 15.1 %       11.5-14.5    H            

 

             PLT (test code = PLT) 189 10\S\3/uL 130-400                   

 

             MPV (test code = MPV) 11.8 fL      9.4-12.4                  

 

             NEUTROP # (test code = NE#) 7.0 10\S\3/uL 1.6-8.0                  

 

 

             LYMPH # (test code = LY#) 1.6 10\S\3/uL 1.1-3.5                   

 

             MONOCYTE # (test code = MO#) 0.7 10\S\3/uL 0.0-1.1                 

  

 

             EOSINOPH # (test code = EO#) 0.1 10\S\3/uL 0.0-0.7                 

  

 

             BASOPHIL # (test code = BA#) 0.0 10\S\3/uL 0.0-0.3                 

  

 

             IG # (test code = IG#) 0.06 10\S\3/uL 0.00-0.06                 

 

             NRBC # (test code = NRBC#) 0.00 10\S\3/uL 0.00-0.01                

 

 

             NEUTROPH % (test code = NE%) 74.5 %       35.0-73.0    H           

 

 

             LYMPH % (test code = LY%) 16.5 %       20.0-55.0    L            

 

             MONO % (test code = MO%) 7.0 %        2.5-10.0                  

 

             EOSINOPH % (test code = EO%) 1.1 %        0.0-5.0                  

 

 

             BASOPHIL % (test code = BA%) 0.3 %        0.0-2.0                  

 

 

             IG % (test code = IG%) 0.6 %        0.0-0.8                   

 

             NRBC% (test code = NRBC%) 0.0 %        0.0-0.2                   

 

             MANDIFF (test code = MDIFF) NO           NO                        

 

             RBC MORPH (test code = RBCMOR)              NORMAL                 

   



COMPREHENSIVE METABOLIC [id number] 05:30:00





             Test Item    Value        Reference Range Interpretation Comments

 

             GLUCOSE (test code = 06D) 122 mg/dL           H            

 

             SODIUM (test code = 01A) 140 mmol/L   136-145                   

 

             POTASSIUM (test code = 01B) 3.8 mmol/L   3.6-5.1                   

 

             CHLORIDE (test code = 04A) 100 mmol/L                       

 

             CO2 (test code = 02A) 32 mmol/L    22-32                     

 

             ANION GAP (test code = ANG) 11.8 mmol/L                            

 

             BUN (test code = 05D) 29 mg/dL     7-18         H            

 

             CREATININE (test code = 03E) 1.5 mg/dL    0.4-1.1      H           

 

 

             BUN/CREA (test code = BCR) 20           12-20                     

 

             CALCIUM (test code = 09D) 9.0 mg/dL    8.3-9.5                   

 

             BILI TOTAL (test code = 11A) 1.3 mg/dL    0.2-1.0      H           

 

 

             PROTEIN (test code = 07D) 7.1 g/dL     6.4-8.2                   

 

             ALBUMIN (test code = 08D) 3.5 g/dL     3.5-4.8                   

 

             GLOBULIN (test code = GLB) 3.6 g/dL     1.5-3.8                   

 

             ALB/GLOB (test code = AGRR) 1.0          1.0-2.6                   

 

             ALK PHOS (test code = 35A) 119 IU/L                         

 

             AST (test code = 30A) 22 IU/L      <=42                      

 

             ALT (test code = 31A) 49 IU/L      <=78                      



CBC (INCLUDES AUTOMATED DIFFERENTIAL)2019 05:18:00





             Test Item    Value        Reference Range Interpretation Comments

 

             WBC (test code = WBC) 9.0 10\S\3/uL 4.5-11.0                  

 

             RBC (test code = RBC) 3.94 10\S\6/uL 4.20-5.60    L            

 

             HGB (test code = HBG) 11.4 g/dL    12.0-15.5    L            

 

             HCT (test code = HCT) 35.8 %       35.0-44.0                 

 

             MCV (test code = MCV) 90.9 fL      81.0-99.0                 

 

             MCH (test code = MCH) 28.9 pg      27.0-31.0                 

 

             MCHC (test code = MCHC) 31.8 g/dL    32.0-36.0    L            

 

             RDW (test code = RDW) 14.8 %       11.5-14.5    H            

 

             PLT (test code = PLT) 164 10\S\3/uL 130-400                   

 

             MPV (test code = MPV) 11.6 fL      9.4-12.4                  

 

             NEUTROP # (test code = NE#) 6.8 10\S\3/uL 1.6-8.0                  

 

 

             LYMPH # (test code = LY#) 1.4 10\S\3/uL 1.1-3.5                   

 

             MONOCYTE # (test code = MO#) 0.6 10\S\3/uL 0.0-1.1                 

  

 

             EOSINOPH # (test code = EO#) 0.1 10\S\3/uL 0.0-0.7                 

  

 

             BASOPHIL # (test code = BA#) 0.0 10\S\3/uL 0.0-0.3                 

  

 

             IG # (test code = IG#) 0.06 10\S\3/uL 0.00-0.06                 

 

             NRBC # (test code = NRBC#) 0.00 10\S\3/uL 0.00-0.01                

 

 

             NEUTROPH % (test code = NE%) 75.6 %       35.0-73.0    H           

 

 

             LYMPH % (test code = LY%) 15.9 %       20.0-55.0    L            

 

             MONO % (test code = MO%) 6.5 %        2.5-10.0                  

 

             EOSINOPH % (test code = EO%) 0.9 %        0.0-5.0                  

 

 

             BASOPHIL % (test code = BA%) 0.4 %        0.0-2.0                  

 

 

             IG % (test code = IG%) 0.7 %        0.0-0.8                   

 

             NRBC% (test code = NRBC%) 0.0 %        0.0-0.2                   

 

             MANDIFF (test code = MDIFF) NO           NO                        

 

             RBC MORPH (test code = RBCMOR)              NORMAL                 

   



URINALYSIS WITH [id number] 06:44:00





             Test Item    Value        Reference Range Interpretation Comments

 

             COLOR (test code = COLU) YELLOW       YELLOW                    

 

             CLARITY (test code = CLA) CLOUDY       CLEAR        A            

 

             GLUCOSE UR (test code = UA NEGATIVE     NEGATIVE                  



             GLUCOSE)                                            

 

             BILI UR (test code = BILE) NEGATIVE     NEGATIVE                  

 

             KETONES UR (test code = ACE) NEGATIVE     NEGATIVE                 

 

 

             SP GRAVITY (test code = SPGR) 1.014        1.005-1.030             

  

 

             PH UR (test code = PH) 6.0          4.5-8.0                   

 

             PROTEIN UR (test code = PU) TRACE        NEGATIVE     A            

 

             UROBIL UR (test code = UROQ) 0.2 EU/dL    0.2-1.0                  

 

 

             NITRITE UR (test code = NEGATIVE     NEGATIVE                  



             NITRITE)                                            

 

             BLOOD UR (test code = UA BLOOD) TRACE        NEGATIVE     A        

    

 

             LEUK ES UR (test code = LEUK) 2+           NEGATIVE     A          

  

 

             WBC UR (test code = UWBC) 12 /HPF      0-5          H            

 

             RBC UR (test code = URBC) 1 /HPF       0-2                       

 

             EPITH UR (test code = UEPC) FEW /LPF     FEW                       

 

             BACTERIA UR (test code = UBACT) MODERATE /HPF NONE         A       

     

 

             CAST UR (test code = CAST)  /LPF        NONE                      

 

             CRYSTAL UR (test code = CRYU)  / LPF       NONE                    

  

 

             MUCUS UR (test code = MUC)  / HPF       NONE                      

 

             AMORPH UR (test code = YASMEEN)  / HPF       NONE                     

 

 

             TRICH UR (test code = UTRICH)  /HPF        NONE                    

  

 

             YEAST UR (test code = UY)  /HPF        NONE                      

 

             SPERM UR (test code = USPERM)  /HPF        NONE                    

  



COMPREHENSIVE METABOLIC [id number] 05:24:00





             Test Item    Value        Reference Range Interpretation Comments

 

             GLUCOSE (test code = 06D) 105 mg/dL           H            

 

             SODIUM (test code = 01A) 138 mmol/L   136-145                   

 

             POTASSIUM (test code = 01B) 4.0 mmol/L   3.6-5.1                   

 

             CHLORIDE (test code = 04A) 104 mmol/L                       

 

             CO2 (test code = 02A) 25 mmol/L    22-32                     

 

             ANION GAP (test code = ANG) 13.0 mmol/L                            

 

             BUN (test code = 05D) 29 mg/dL     7-18         H            

 

             CREATININE (test code = 03E) 1.5 mg/dL    0.4-1.1      H           

 

 

             BUN/CREA (test code = BCR) 19           12-20                     

 

             CALCIUM (test code = 09D) 8.4 mg/dL    8.3-9.5                   

 

             BILI TOTAL (test code = 11A) 0.7 mg/dL    0.2-1.0                  

 

 

             PROTEIN (test code = 07D) 6.2 g/dL     6.4-8.2      L            

 

             ALBUMIN (test code = 08D) 3.1 g/dL     3.5-4.8      L            

 

             GLOBULIN (test code = GLB) 3.1 g/dL     1.5-3.8                   

 

             ALB/GLOB (test code = AGRR) 1.0          1.0-2.6                   

 

             ALK PHOS (test code = 35A) 104 IU/L                         

 

             AST (test code = 30A) 22 IU/L      <=42                      

 

             ALT (test code = 31A) 42 IU/L      <=78                      



CBC (INCLUDES AUTOMATED DIFFERENTIAL)2019 05:07:00





             Test Item    Value        Reference Range Interpretation Comments

 

             WBC (test code = WBC) 8.6 10\S\3/uL 4.5-11.0                  

 

             RBC (test code = RBC) 3.72 10\S\6/uL 4.20-5.60    L            

 

             HGB (test code = HBG) 10.8 g/dL    12.0-15.5    L            

 

             HCT (test code = HCT) 33.7 %       35.0-44.0    L            

 

             MCV (test code = MCV) 90.6 fL      81.0-99.0                 

 

             MCH (test code = MCH) 29.0 pg      27.0-31.0                 

 

             MCHC (test code = MCHC) 32.0 g/dL    32.0-36.0                 

 

             RDW (test code = RDW) 14.7 %       11.5-14.5    H            

 

             PLT (test code = PLT) 152 10\S\3/uL 130-400                   

 

             MPV (test code = MPV) 11.4 fL      9.4-12.4                  

 

             NEUTROP # (test code = NE#) 6.2 10\S\3/uL 1.6-8.0                  

 

 

             LYMPH # (test code = LY#) 1.6 10\S\3/uL 1.1-3.5                   

 

             MONOCYTE # (test code = MO#) 0.5 10\S\3/uL 0.0-1.1                 

  

 

             EOSINOPH # (test code = EO#) 0.2 10\S\3/uL 0.0-0.7                 

  

 

             BASOPHIL # (test code = BA#) 0.1 10\S\3/uL 0.0-0.3                 

  

 

             IG # (test code = IG#) 0.03 10\S\3/uL 0.00-0.06                 

 

             NRBC # (test code = NRBC#) 0.00 10\S\3/uL 0.00-0.01                

 

 

             NEUTROPH % (test code = NE%) 72.8 %       35.0-73.0                

 

 

             LYMPH % (test code = LY%) 18.5 %       20.0-55.0    L            

 

             MONO % (test code = MO%) 5.8 %        2.5-10.0                  

 

             EOSINOPH % (test code = EO%) 2.0 %        0.0-5.0                  

 

 

             BASOPHIL % (test code = BA%) 0.6 %        0.0-2.0                  

 

 

             IG % (test code = IG%) 0.3 %        0.0-0.8                   

 

             NRBC% (test code = NRBC%) 0.0 %        0.0-0.2                   

 

             MANDIFF (test code = MDIFF) NO           NO                        

 

             RBC MORPH (test code = RBCMOR)              NORMAL                 

   



U/S KIDNEY (RENAL)2019 23:20:42LOCATION: [id number]: 63-year-old female 
who presents with acute nontraumatic kidney injury.COMMENT:Sonographic imaging 
of this patient's retroperitoneum was performed.The right kidney measures 9.9 x 
5.9 x 6.4 cm with a 13 mm cortical thickness. Acyst is seen in the upper pole 
measuring 23 x 19 x 19 mm, and a second cyst isseen in the lower pole measuring 
18 x 16 x 16 mm.The left kidney measures 9.4 x 5.3 x 5.6 cm with a 11 mm 
cortical thickness. Nocysts or masses are seen in the left kidney.Cortical 
echotexture of the kidneys otherwise is unremarkable.There is no evidence of 
hydronephrosis of either kidney.In the urinary bladder only the left urine jet 
was observed on the color flowstudy. The bladder otherwise is 
unremarkable.IMPRESSION:Cystic changes are seen in the upper pole and lower pole
ofthis patient'sright kidney. No gross abnormalities are seen elsewhere in 
either kidney.The urinary bladder is unremarkable. Only the left urine jet was 
observed onthe color flow study.TROPONIN -76-09 07:14:00





             Test Item    Value        Reference Range Interpretation Comments

 

             TROPONIN I (test code = A84) <0.015 ng/mL 0.000-0.045              

 



COMPREHENSIVE METABOLIC [id number] 05:28:00





             Test Item    Value        Reference Range Interpretation Comments

 

             GLUCOSE (test code = 06D) 110 mg/dL           H            

 

             SODIUM (test code = 01A) 138 mmol/L   136-145                   

 

             POTASSIUM (test code = 01B) 3.9 mmol/L   3.6-5.1                   

 

             CHLORIDE (test code = 04A) 106 mmol/L                       

 

             CO2 (test code = 02A) 24 mmol/L    22-32                     

 

             ANION GAP (test code = ANG) 11.9 mmol/L                            

 

             BUN (test code = 05D) 22 mg/dL     7-18         H            

 

             CREATININE (test code = 03E) 1.5 mg/dL    0.4-1.1      H           

 

 

             BUN/CREA (test code = BCR) 15           12-20                     

 

             CALCIUM (test code = 09D) 8.2 mg/dL    8.3-9.5      L            

 

             BILI TOTAL (test code = 11A) 0.6 mg/dL    0.2-1.0                  

 

 

             PROTEIN (test code = 07D) 6.0 g/dL     6.4-8.2      L            

 

             ALBUMIN (test code = 08D) 2.9 g/dL     3.5-4.8      L            

 

             GLOBULIN (test code = GLB) 3.1 g/dL     1.5-3.8                   

 

             ALB/GLOB (test code = AGRR) 0.9          1.0-2.6      L            

 

             ALK PHOS (test code = 35A) 96 IU/L                          

 

             AST (test code = 30A) 19 IU/L      <=42                      

 

             ALT (test code = 31A) 35 IU/L      <=78                      



CBC (INCLUDES AUTOMATED DIFFERENTIAL)2019 05:14:00





             Test Item    Value        Reference Range Interpretation Comments

 

             WBC (test code = WBC) 7.0 10\S\3/uL 4.5-11.0                  

 

             RBC (test code = RBC) 3.64 10\S\6/uL 4.20-5.60    L            

 

             HGB (test code = HBG) 10.6 g/dL    12.0-15.5    L            

 

             HCT (test code = HCT) 33.5 %       35.0-44.0    L            

 

             MCV (test code = MCV) 92.0 fL      81.0-99.0                 

 

             MCH (test code = MCH) 29.1 pg      27.0-31.0                 

 

             MCHC (test code = MCHC) 31.6 g/dL    32.0-36.0    L            

 

             RDW (test code = RDW) 14.9 %       11.5-14.5    H            

 

             PLT (test code = PLT) 145 10\S\3/uL 130-400                   

 

             MPV (test code = MPV) 11.4 fL      9.4-12.4                  

 

             NEUTROP # (test code = NE#) 4.2 10\S\3/uL 1.6-8.0                  

 

 

             LYMPH # (test code = LY#) 2.1 10\S\3/uL 1.1-3.5                   

 

             MONOCYTE # (test code = MO#) 0.5 10\S\3/uL 0.0-1.1                 

  

 

             EOSINOPH # (test code = EO#) 0.1 10\S\3/uL 0.0-0.7                 

  

 

             BASOPHIL # (test code = BA#) 0.0 10\S\3/uL 0.0-0.3                 

  

 

             IG # (test code = IG#) 0.04 10\S\3/uL 0.00-0.06                 

 

             NRBC # (test code = NRBC#) 0.00 10\S\3/uL 0.00-0.01                

 

 

             NEUTROPH % (test code = NE%) 59.7 %       35.0-73.0                

 

 

             LYMPH % (test code = LY%) 30.1 %       20.0-55.0                 

 

             MONO % (test code = MO%) 7.0 %        2.5-10.0                  

 

             EOSINOPH % (test code = EO%) 2.0 %        0.0-5.0                  

 

 

             BASOPHIL % (test code = BA%) 0.6 %        0.0-2.0                  

 

 

             IG % (test code = IG%) 0.6 %        0.0-0.8                   

 

             NRBC% (test code = NRBC%) 0.0 %        0.0-0.2                   

 

             MANDIFF (test code = MDIFF) NO           NO                        

 

             RBC MORPH (test code = RBCMOR)              NORMAL                 

   



COMPREHENSIVE METABOLIC PAN2019-08-15 04:58:00





             Test Item    Value        Reference Range Interpretation Comments

 

             GLUCOSE (test code = 06D) 112 mg/dL           H            

 

             SODIUM (test code = 01A) 143 mmol/L   136-145                   

 

             POTASSIUM (test code = 01B) 4.4 mmol/L   3.6-5.1                   

 

             CHLORIDE (test code = 04A) 108 mmol/L          H            

 

             CO2 (test code = 02A) 27 mmol/L    22-32                     

 

             ANION GAP (test code = ANG) 12.4 mmol/L                            

 

             BUN (test code = 05D) 19 mg/dL     7-18         H            

 

             CREATININE (test code = 03E) 1.4 mg/dL    0.4-1.1      H           

 

 

             BUN/CREA (test code = BCR) 14           12-20                     

 

             CALCIUM (test code = 09D) 8.5 mg/dL    8.3-9.5                   

 

             BILI TOTAL (test code = 11A) 0.9 mg/dL    0.2-1.0                  

 

 

             PROTEIN (test code = 07D) 6.2 g/dL     6.4-8.2      L            

 

             ALBUMIN (test code = 08D) 2.9 g/dL     3.5-4.8      L            

 

             GLOBULIN (test code = GLB) 3.3 g/dL     1.5-3.8                   

 

             ALB/GLOB (test code = AGRR) 0.9          1.0-2.6      L            

 

             ALK PHOS (test code = 35A) 95 IU/L                          

 

             AST (test code = 30A) 19 IU/L      <=42                      

 

             ALT (test code = 31A) 40 IU/L      <=78                      



CBC (INCLUDES AUTOMATED DIFFERENTIAL)2019-08-15 04:51:00





             Test Item    Value        Reference Range Interpretation Comments

 

             WBC (test code = WBC) 8.2 10\S\3/uL 4.5-11.0                  

 

             RBC (test code = RBC) 3.48 10\S\6/uL 4.20-5.60    L            

 

             HGB (test code = HBG) 10.3 g/dL    12.0-15.5    L            

 

             HCT (test code = HCT) 32.4 %       35.0-44.0    L            

 

             MCV (test code = MCV) 93.1 fL      81.0-99.0                 

 

             MCH (test code = MCH) 29.6 pg      27.0-31.0                 

 

             MCHC (test code = MCHC) 31.8 g/dL    32.0-36.0    L            

 

             RDW (test code = RDW) 15.5 %       11.5-14.5    H            

 

             PLT (test code = PLT) 163 10\S\3/uL 130-400                   

 

             MPV (test code = MPV) 11.8 fL      9.4-12.4                  

 

             NEUTROP # (test code = NE#) 5.3 10\S\3/uL 1.6-8.0                  

 

 

             LYMPH # (test code = LY#) 2.0 10\S\3/uL 1.1-3.5                   

 

             MONOCYTE # (test code = MO#) 0.6 10\S\3/uL 0.0-1.1                 

  

 

             EOSINOPH # (test code = EO#) 0.2 10\S\3/uL 0.0-0.7                 

  

 

             BASOPHIL # (test code = BA#) 0.0 10\S\3/uL 0.0-0.3                 

  

 

             IG # (test code = IG#) 0.03 10\S\3/uL 0.00-0.06                 

 

             NRBC # (test code = NRBC#) 0.00 10\S\3/uL 0.00-0.01                

 

 

             NEUTROPH % (test code = NE%) 65.5 %       35.0-73.0                

 

 

             LYMPH % (test code = LY%) 24.2 %       20.0-55.0                 

 

             MONO % (test code = MO%) 7.2 %        2.5-10.0                  

 

             EOSINOPH % (test code = EO%) 2.2 %        0.0-5.0                  

 

 

             BASOPHIL % (test code = BA%) 0.5 %        0.0-2.0                  

 

 

             IG % (test code = IG%) 0.4 %        0.0-0.8                   

 

             NRBC% (test code = NRBC%) 0.0 %        0.0-0.2                   

 

             MANDIFF (test code = MDIFF) NO           NO                        



CARDIAC [id number] 23:10:00





             Test Item    Value        Reference Range Interpretation Comments

 

             TROPONIN I (test code = A84) <0.015 ng/mL 0.000-0.045              

 



U/S VENOUS DOPPLER IVON LOW [id number] 22:23:14LOCATION: [id number]: 
63-year-old female who presents with bilateral leg swelling.COMMENT: Sonographic
imaging of the venous anatomy in both of this patient's legs wasobtained 
utilizing grayscale, color-flow, and Doppler waveform imagingmodalities.The 
common femoral veins, superficial femoral veins, popliteal veins, 
posteriortibial veins, anterior tibial veins, and peroneal veins in both legs 
wereincluded in the study.The anatomy exhibits normal compressibility on 
grayscale study. No suspiciousfilling defects are seen on the color flow study. 
Appropriate waveform responseas are noted on the Dopplerstudy during 
augmentation maneuvers and duringquiet respiration. IMPRESSION:There is no 
sonographic evidence of venous thrombosis in either of thispatient's legs.
CARDIAC [id number] 16:50:00





             Test Item    Value        Reference Range Interpretation Comments

 

             TROPONIN I (test code = A84) <0.015 ng/mL 0.000-0.045              

 



BRAIN NATRIURETIC [id number] 14:39:00





             Test Item    Value        Reference Range Interpretation Comments

 

             proBNP (test code = PBNP) 1337 pg/mL   0-125        H            



THYROID PANEL/SCREEN (TSH)2019 12:05:00





             Test Item    Value        Reference Range Interpretation Comments

 

             TSH (test code = A57) 0.989 uIU/mL 0.358-3.740               



[id number] 12:02:00





             Test Item    Value        Reference Range Interpretation Comments

 

             CPK (test code = 32A) 98 IU/L                          



AMYLASE AND [id number] 12:02:00





             Test Item    Value        Reference Range Interpretation Comments

 

             AMYLASE (test code = 10A) 19 U/L              L            

 

             LIPASE (test code = 60A) 67 IU/L             L            



COMPREHENSIVE METABOLIC [id number] 12:02:00





             Test Item    Value        Reference Range Interpretation Comments

 

             GLUCOSE (test code = 06D) 109 mg/dL           H            

 

             SODIUM (test code = 01A) 142 mmol/L   136-145                   

 

             POTASSIUM (test code = 01B) 4.2 mmol/L   3.6-5.1                   

 

             CHLORIDE (test code = 04A) 109 mmol/L          H            

 

             CO2 (test code = 02A) 26 mmol/L    22-32                     

 

             ANION GAP (test code = ANG) 11.2 mmol/L                            

 

             BUN (test code = 05D) 16 mg/dL     7-18                      

 

             CREATININE (test code = 03E) 1.3 mg/dL    0.4-1.1      H           

 

 

             BUN/CREA (test code = BCR) 13           12-20                     

 

             CALCIUM (test code = 09D) 8.7 mg/dL    8.3-9.5                   

 

             BILI TOTAL (test code = 11A) 1.3 mg/dL    0.2-1.0      H           

 

 

             PROTEIN (test code = 07D) 6.5 g/dL     6.4-8.2                   

 

             ALBUMIN (test code = 08D) 3.2 g/dL     3.5-4.8      L            

 

             GLOBULIN (test code = GLB) 3.3 g/dL     1.5-3.8                   

 

             ALB/GLOB (test code = AGRR) 1.0          1.0-2.6                   

 

             ALK PHOS (test code = 35A) 101 IU/L                         

 

             AST (test code = 30A) 21 IU/L      <=42                      

 

             ALT (test code = 31A) 41 IU/L      <=78                      



TROPONIN [id number] 11:57:00





             Test Item    Value        Reference Range Interpretation Comments

 

             TROPONIN I (test code = A84) <0.015 ng/mL 0.000-0.045              

 



XR CHEST 1 VIEW [id number] 11:55:22EXAM: XR CHEST 1 VIEW 
PORTABLE.LOCATION: D4.HISTORY: [id number]: Chest pain.COMPARISON: Radiograph dated
2019.TECHNIQUE: Single AP view of the chest was obtained. FINDINGS:The 
heart is enlarged in size. Small left pleural effusion and left basilaropacities
are present. There is elevation of the right hemidiaphragm. No acuteosseous 
abnormality is identified.IMPRESSION:Small left pleural effusion with left 
basilar opacities, which may representatelectasis or infiltrates.Cardiomegaly.
PRO TIME AND [id number] 11:43:00





             Test Item    Value        Reference Range Interpretation Comments

 

             PT (test code = 13.4 s       9.8-13.6                  



             TT)                                                 

 

             INR (test code = 1.2                                    



             INR)                                                

 

             INRH (test code =  **** SUGGESTED THERAPEUTIC                      

     



             INRH)        RANGE FOR INR: **** 2.5 -                           



                          3.5 ** For Patients with                           



                          Prosthetic Valves or                           



                          Patients with recurrent                           



                          Thromboembolic Events **                           



                          2.0 - 3.0 ** For Most Other                           



                          Applications **                           

 

             PTT (test code = 30.4 s       20.2-38.0                 



             PTT)                                                

 

             PTTH (test code = **** To monitor the                           



             PTTH)        effectiveness of heparin,                           



                          we offer the Anti-Xa                           



                          (Heparin Assay). It can be                           



                          used for either                           



                          unfractionated or LMW                           



                          Heparin. Order Code is                           



                          ANTI-XA ****                           



CBC (INCLUDES AUTOMATED DIFFERENTIAL)2019 11:36:00





             Test Item    Value        Reference Range Interpretation Comments

 

             WBC (test code = WBC) 7.6 10\S\3/uL 4.5-11.0                  

 

             RBC (test code = RBC) 3.53 10\S\6/uL 4.20-5.60    L            

 

             HGB (test code = HBG) 10.3 g/dL    12.0-15.5    L            

 

             HCT (test code = HCT) 33.5 %       35.0-44.0    L            

 

             MCV (test code = MCV) 94.9 fL      81.0-99.0                 

 

             MCH (test code = MCH) 29.2 pg      27.0-31.0                 

 

             MCHC (test code = MCHC) 30.7 g/dL    32.0-36.0    L            

 

             RDW (test code = RDW) 15.4 %       11.5-14.5    H            

 

             PLT (test code = PLT) 164 10\S\3/uL 130-400                   

 

             MPV (test code = MPV) 11.7 fL      9.4-12.4                  

 

             NEUTROP # (test code = NE#) 5.6 10\S\3/uL 1.6-8.0                  

 

 

             LYMPH # (test code = LY#) 1.4 10\S\3/uL 1.1-3.5                   

 

             MONOCYTE # (test code = MO#) 0.4 10\S\3/uL 0.0-1.1                 

  

 

             EOSINOPH # (test code = EO#) 0.1 10\S\3/uL 0.0-0.7                 

  

 

             BASOPHIL # (test code = BA#) 0.0 10\S\3/uL 0.0-0.3                 

  

 

             IG # (test code = IG#) 0.04 10\S\3/uL 0.00-0.06                 

 

             NRBC # (test code = NRBC#) 0.00 10\S\3/uL 0.00-0.01                

 

 

             NEUTROPH % (test code = NE%) 73.9 %       35.0-73.0    H           

 

 

             LYMPH % (test code = LY%) 18.0 %       20.0-55.0    L            

 

             MONO % (test code = MO%) 5.7 %        2.5-10.0                  

 

             EOSINOPH % (test code = EO%) 1.6 %        0.0-5.0                  

 

 

             BASOPHIL % (test code = BA%) 0.3 %        0.0-2.0                  

 

 

             IG % (test code = IG%) 0.5 %        0.0-0.8                   

 

             NRBC% (test code = NRBC%) 0.0 %        0.0-0.2                   

 

             MANDIFF (test code = MDIFF) NO           NO                        

 

             RBC MORPH (test code = RBCMOR)              NORMAL                 

   



CBC WITH [id number] 15:29:00





             Test Item    Value        Reference Range Interpretation Comments

 

             WBC (test code = WBC) 9.7 10\S\3/uL 4.5-11.0                  

 

             RBC (test code = RBC) 3.73 10\S\6/uL 4.20-5.60    L            

 

             HGB (test code = HBG) 11.0 g/dL    12.0-15.5    L            

 

             HCT (test code = HCT) 34.1 %       35.0-44.0    L            

 

             MCV (test code = MCV) 91.4 fL      81.0-99.0                 

 

             MCH (test code = MCH) 29.5 pg      27.0-31.0                 

 

             MCHC (test code = MCHC) 32.3 g/dL    32.0-36.0                 

 

             RDW (test code = RDW) 14.2 %       11.5-14.5                 

 

             PLT (test code = PLT) 116 10\S\3/uL 130-400      L            

 

             MPV (test code = MPV) 11.8 fL      9.4-12.4                  

 

             NEUTROP # (test code = NE#) 7.9 10\S\3/uL 1.6-8.0                  

 

 

             LYMPH # (test code = LY#) 1.0 10\S\3/uL 1.1-3.5      L            

 

             MONOCYTE # (test code = 0.6 10\S\3/uL 0.0-1.1                   



             MO#)                                                

 

             EOSINOPH # (test code = 0.1 10\S\3/uL 0.0-0.7                   



             EO#)                                                

 

             BASOPHIL # (test code = 0.0 10\S\3/uL 0.0-0.3                   



             BA#)                                                

 

             IG # (test code = IG#) 0.04 10\S\3/uL 0.00-0.06                 

 

             NRBC # (test code = NRBC#) 0.00 10\S\3/uL 0.00-0.01                

 

 

             NEUTROPH % (test code = 81.6 %       35.0-73.0    H            



             NE%)                                                

 

             LYMPH % (test code = LY%) 10.3 %       20.0-55.0    L            

 

             MONO % (test code = MO%) 6.4 %        2.5-10.0                  

 

             EOSINOPH % (test code = 1.0 %        0.0-5.0                   



             EO%)                                                

 

             BASOPHIL % (test code = 0.3 %        0.0-2.0                   



             BA%)                                                

 

             IG % (test code = IG%) 0.4 %        0.0-0.8                   

 

             NRBC% (test code = NRBC%) 0.0 %        0.0-0.2                   

 

             PLT EST (test code = ADEQUATE     ADEQUATE                  



             PLTEST)                                             

 

             PLT MORPH (test code = NORMAL (1.5-3 um) NORMAL                    



             PLTMOR)                                             



BASIC METABOLIC [id number] 15:26:00





             Test Item    Value        Reference Range Interpretation Comments

 

             GLUCOSE (test code = 06D) 118 mg/dL           H            

 

             SODIUM (test code = 01A) 136 mmol/L   136-145                   

 

             POTASSIUM (test code = 01B) 4.2 mmol/L   3.6-5.1                   

 

             CHLORIDE (test code = 04A) 106 mmol/L                       

 

             CO2 (test code = 02A) 24 mmol/L    22-32                     

 

             ANION GAP (test code = ANG) 10.2 mmol/L                            

 

             BUN (test code = 05D) 38 mg/dL     7-18         H            

 

             CREATININE (test code = 03E) 1.6 mg/dL    0.4-1.1      H           

 

 

             BUN/CREA (test code = BCR) 23           12-20        H            

 

             CALCIUM (test code = 09D) 9.2 mg/dL    8.3-9.5                   



CARDIAC [id number] 06:04:00





             Test Item    Value        Reference Range Interpretation Comments

 

             TROPONIN I (test code = A84) <0.015 ng/mL 0.000-0.045              

 



BASIC METABOLIC [id number] 05:52:00





             Test Item    Value        Reference Range Interpretation Comments

 

             GLUCOSE (test code = 06D) 171 mg/dL           H            

 

             SODIUM (test code = 01A) 138 mmol/L   136-145                   

 

             POTASSIUM (test code = 01B) 4.0 mmol/L   3.6-5.1                   

 

             CHLORIDE (test code = 04A) 107 mmol/L                       

 

             CO2 (test code = 02A) 23 mmol/L    22-32                     

 

             ANION GAP (test code = ANG) 12.0 mmol/L                            

 

             BUN (test code = 05D) 19 mg/dL     7-18         H            

 

             CREATININE (test code = 03E) 1.2 mg/dL    0.4-1.1      H           

 

 

             BUN/CREA (test code = BCR) 15           12-20                     

 

             CALCIUM (test code = 09D) 8.5 mg/dL    8.3-9.5                   



CBC (INCLUDES AUTOMATED DIFFERENTIAL)2019 05:30:00





             Test Item    Value        Reference Range Interpretation Comments

 

             WBC (test code = WBC) 14.0 10\S\3/uL 4.5-11.0     H            

 

             RBC (test code = RBC) 3.64 10\S\6/uL 4.20-5.60    L            

 

             HGB (test code = HBG) 10.8 g/dL    12.0-15.5    L            

 

             HCT (test code = HCT) 33.2 %       35.0-44.0    L            

 

             MCV (test code = MCV) 91.2 fL      81.0-99.0                 

 

             MCH (test code = MCH) 29.7 pg      27.0-31.0                 

 

             MCHC (test code = MCHC) 32.5 g/dL    32.0-36.0                 

 

             RDW (test code = RDW) 14.0 %       11.5-14.5                 

 

             PLT (test code = PLT) 143 10\S\3/uL 130-400                   

 

             MPV (test code = MPV) 11.4 fL      9.4-12.4                  

 

             NEUTROP # (test code = NE#) 12.1 10\S\3/uL 1.6-8.0      H          

  

 

             LYMPH # (test code = LY#) 0.9 10\S\3/uL 1.1-3.5      L            

 

             MONOCYTE # (test code = MO#) 0.9 10\S\3/uL 0.0-1.1                 

  

 

             EOSINOPH # (test code = EO#) 0.0 10\S\3/uL 0.0-0.7                 

  

 

             BASOPHIL # (test code = BA#) 0.0 10\S\3/uL 0.0-0.3                 

  

 

             IG # (test code = IG#) 0.10 10\S\3/uL 0.00-0.06    H            

 

             NRBC # (test code = NRBC#) 0.00 10\S\3/uL 0.00-0.01                

 

 

             NEUTROPH % (test code = NE%) 86.2 %       35.0-73.0    H           

 

 

             LYMPH % (test code = LY%) 6.3 %        20.0-55.0    L            

 

             MONO % (test code = MO%) 6.6 %        2.5-10.0                  

 

             EOSINOPH % (test code = EO%) 0.0 %        0.0-5.0                  

 

 

             BASOPHIL % (test code = BA%) 0.2 %        0.0-2.0                  

 

 

             IG % (test code = IG%) 0.7 %        0.0-0.8                   

 

             NRBC% (test code = NRBC%) 0.0 %        0.0-0.2                   

 

             MANDIFF (test code = MDIFF) NO           NO                        

 

             RBC MORPH (test code = RBCMOR)              NORMAL                 

   



CARDIAC [id number] 23:08:00





             Test Item    Value        Reference Range Interpretation Comments

 

             TROPONIN I (test code = A84) <0.015 ng/mL 0.000-0.045              

 



CT DISSECTION [id number] 19:26:35Exam: CT thorax PE protocol.Location: H
12History: [id number]: Chest painTechnique: Enhanced spiral slices were taken from
the apices of the lungs,through the upper abdomen utilizing a pulmonary embolism
protocol. Multiplanarreformations were performed. 100cc of Omnipaque were used. 
One or more of thefollowing radiation dose reduction techniques was used: 
automated exposurecontrol, adjustment of mA and/or KV according to patient size,
and/orutilization of iterative reconstruction technique.Findings:Nofilling 
defects are seen in the main pulmonary arteries. The pulmonaryvasculature is 
normal. No pulmonary venous congestion or arterial hypertensionis seen.The lungs
are clear. No infiltration or effusion is seen. No mass or nodule isseen.The 
mediastinum and the pulmonary kandis are normal. Calcified granulomas arenoted. No
lymphadenopathy is present. The heart size is normal.No pericardialeffusion is s
een.The visualized upper abdominal organs are unremarkable.Impression:1. 
Negative for pulmonary embolism.2. No acute disease.THYROID PANEL/SCREEN (TSH)
2019 18:29:00





             Test Item    Value        Reference Range Interpretation Comments

 

             TSH (test code = A57) 0.706 uIU/mL 0.358-3.740               



LIVER [id number] 18:28:00





             Test Item    Value        Reference Range Interpretation Comments

 

             BILI TOTAL (test code = 11A) 0.9 mg/dL    0.2-1.0                  

 

 

             BILI DIRCT (test code = 12A) 0.2 mg/dL    0.0-0.2                  

 

 

             BILI INDIR (test code = BILII) 0.7 mg/dL    <=0.8                  

   

 

             PROTEIN (test code = 07D) 7.7 g/dL     6.4-8.2                   

 

             ALBUMIN (test code = 08D) 3.8 g/dL     3.5-4.8                   

 

             GLOBULIN (test code = GLB) 3.9 g/dL     1.5-3.8      H            

 

             ALB/GLOB (test code = AGRR) 1.0          1.0-2.6                   

 

             ALK PHOS (test code = 35A) 106 IU/L                         

 

             AST (test code = 30A) 25 IU/L      <=42                      

 

             ALT (test code = 31A) 21 IU/L      <=78                      



BRAIN NATRIURETIC [id number] 18:19:00





             Test Item    Value        Reference Range Interpretation Comments

 

             proBNP (test code = PBNP) 518 pg/mL    0-125        H            



[id number] 18:15:00





             Test Item    Value        Reference Range Interpretation Comments

 

             MAGNESIUM (test code = 48A) 1.9 mg/dL    1.8-2.4                   



[id number] 17:40:00





             Test Item    Value        Reference Range Interpretation Comments

 

             D-DIMER (test code = <200 ng/mL D-DU 0-234                     



             DDI)                                                

 

             D-DIMER COMMENT (test *Level to rule out                           



             code = DDCOM) DVT or PE: <235 ng/mL                           



                          D-DU*                                  



CARDIAC [id number] 17:31:00





             Test Item    Value        Reference Range Interpretation Comments

 

             TROPONIN I (test code = A84) <0.015 ng/mL 0.000-0.045              

 



BASIC METABOLIC [id number] 17:27:00





             Test Item    Value        Reference Range Interpretation Comments

 

             GLUCOSE (test code = 06D) 114 mg/dL           H            

 

             SODIUM (test code = 01A) 142 mmol/L   136-145                   

 

             POTASSIUM (test code = 01B) 4.0 mmol/L   3.6-5.1                   

 

             CHLORIDE (test code = 04A) 111 mmol/L          H            

 

             CO2 (test code = 02A) 26 mmol/L    22-32                     

 

             ANION GAP (test code = ANG) 9.0 mmol/L                             

 

             BUN (test code = 05D) 19 mg/dL     7-18         H            

 

             CREATININE (test code = 03E) 1.2 mg/dL    0.4-1.1      H           

 

 

             BUN/CREA (test code = BCR) 15           12-20                     

 

             CALCIUM (test code = 09D) 9.2 mg/dL    8.3-9.5                   



CBC (INCLUDES AUTOMATED DIFFERENTIAL)2019 17:26:00





             Test Item    Value        Reference Range Interpretation Comments

 

             WBC (test code = WBC) 12.6 10\S\3/uL 4.5-11.0     H            

 

             RBC (test code = RBC) 4.04 10\S\6/uL 4.20-5.60    L            

 

             HGB (test code = HBG) 11.9 g/dL    12.0-15.5    L            

 

             HCT (test code = HCT) 37.6 %       35.0-44.0                 

 

             MCV (test code = MCV) 93.1 fL      81.0-99.0                 

 

             MCH (test code = MCH) 29.5 pg      27.0-31.0                 

 

             MCHC (test code = MCHC) 31.6 g/dL    32.0-36.0    L            

 

             RDW (test code = RDW) 13.8 %       11.5-14.5                 

 

             PLT (test code = PLT) 152 10\S\3/uL 130-400                   

 

             MPV (test code = MPV) 11.3 fL      9.4-12.4                  

 

             NEUTROP # (test code = NE#) 10.7 10\S\3/uL 1.6-8.0      H          

  

 

             LYMPH # (test code = LY#) 1.1 10\S\3/uL 1.1-3.5                   

 

             MONOCYTE # (test code = MO#) 0.6 10\S\3/uL 0.0-1.1                 

  

 

             EOSINOPH # (test code = EO#) 0.1 10\S\3/uL 0.0-0.7                 

  

 

             BASOPHIL # (test code = BA#) 0.0 10\S\3/uL 0.0-0.3                 

  

 

             IG # (test code = IG#) 0.06 10\S\3/uL 0.00-0.06                 

 

             NRBC # (test code = NRBC#) 0.00 10\S\3/uL 0.00-0.01                

 

 

             NEUTROPH % (test code = NE%) 85.2 %       35.0-73.0    H           

 

 

             LYMPH % (test code = LY%) 8.4 %        20.0-55.0    L            

 

             MONO % (test code = MO%) 5.0 %        2.5-10.0                  

 

             EOSINOPH % (test code = EO%) 0.6 %        0.0-5.0                  

 

 

             BASOPHIL % (test code = BA%) 0.3 %        0.0-2.0                  

 

 

             IG % (test code = IG%) 0.5 %        0.0-0.8                   

 

             NRBC% (test code = NRBC%) 0.0 %        0.0-0.2                   

 

             MANDIFF (test code = MDIFF) NO           NO                        



PTT (PARTIAL THROMBOPLASTIN TIME)2019 17:26:00





             Test Item    Value        Reference Range Interpretation Comments

 

             PTT (test code = 31.9 s       20.2-38.0                 



             PTT)                                                

 

             PTTH (test code = **** To monitor the                           



             PTTH)        effectiveness of heparin,                           



                          we offer the Anti-Xa                           



                          (Heparin Assay). It can be                           



                          used for either                           



                          unfractionated or LMW                           



                          Heparin. Order Code is                           



                          ANTI-XA ****                           



PROTHROMBIN [id number] 17:26:00





             Test Item    Value        Reference Range Interpretation Comments

 

             PT (test code = 12.0 s       9.8-13.6                  



             TT)                                                 

 

             INR (test code = 1.1                                    



             INR)                                                

 

             INRH (test code =  **** SUGGESTED THERAPEUTIC                      

     



             INRH)        RANGE FOR INR: **** 2.5 -                           



                          3.5 ** For Patients with                           



                          Prosthetic Valves or                           



                          Patients with  recurrent                           



                          Thromboembolic Events **                           



                          2.0 - 3.0 ** For Most Other                           



                          Applications **                           



XR CHEST 1 VIEW [id number] 17:04:28Portable AP chest, 1 viewLocation 
Code: [id number] HISTORY: Chest painCOMPARISON: NoneCOMMENT: Mild atelectasis is
present within the lung bases. The costophrenic angles aresharp. The 
cardiomediastinalsilhouette is unremarkable. The bones are intact.IMPRESSION: 
Mild bibasilar atelectasis. Otherwise, no acute abnormalityCHEM [id number] 
16:51:00





             Test Item    Value        Reference Range Interpretation Comments

 

             Creatinine Lvl (test code = Creatinine 1.15         0.50-1.40      

           



             Lvl)                                                



[facility]2017-01-24 16:51:00





             Test Item    Value        Reference Range Interpretation Comments

 

             BUN (test code = BUN) 16           -                      



[facility]2017-01-24 16:51:00





             Test Item    Value        Reference Range Interpretation Comments

 

             Chloride Lvl (test code = Chloride Lvl) 109                  

            



[facility]2017-01-24 16:51:00





             Test Item    Value        Reference Range Interpretation Comments

 

             Potassium Lvl (test code = Potassium 4.3          3.5-5.1          

         



             Lvl)                                                



[facility]2017-01-24 16:51:00





             Test Item    Value        Reference Range Interpretation Comments

 

             Sodium Lvl (test code = Sodium Lvl) 144          135-145           

        



[facility]2017-01-24 16:51:00





             Test Item    Value        Reference Range Interpretation Comments

 

             CO2 (test code = CO2) 28           24-32                     



[facility]2017-01-24 16:51:00





             Test Item    Value        Reference Range Interpretation Comments

 

             Calcium Lvl (test code = Calcium Lvl) 8.4          8.5-10.5        

          



[facility]2017-01-24 16:51:00





             Test Item    Value        Reference Range Interpretation Comments

 

             AGAP (test code = AGAP) 11.3         10.0-20.0                 



[facility]2017-01-24 16:51:00





             Test Item    Value        Reference Range Interpretation Comments

 

             Glucose Lvl (test code = Glucose Lvl) 109          70-99           

          



[facility]2017-01-24 16:51:00





             Test Item    Value        Reference Range Interpretation Comments

 

             eGFR (test code = eGFR) 52                                     



[facility]2017-01-24 16:51:00





             Test Item    Value        Reference Range Interpretation Comments

 

             Creatinine Lvl (test code = Creatinine 1.15         0.50-1.40      

           



             Lvl)                                                



[facility]2017-01-24 16:51:00





             Test Item    Value        Reference Range Interpretation Comments

 

             BUN (test code = BUN) 16           -                      



[facility]2017-01-24 16:51:00





             Test Item    Value        Reference Range Interpretation Comments

 

             Chloride Lvl (test code = Chloride Lvl) 109                  

            



[facility]2017-01-24 16:51:00





             Test Item    Value        Reference Range Interpretation Comments

 

             Potassium Lvl (test code = Potassium 4.3          3.5-5.1          

         



             Lvl)                                                



[facility]2017-01-24 16:51:00





             Test Item    Value        Reference Range Interpretation Comments

 

             Sodium Lvl (test code = Sodium Lvl) 144          135-145           

        



[facility]2017-01-24 16:51:00





             Test Item    Value        Reference Range Interpretation Comments

 

             CO2 (test code = CO2) 28           24-32                     



[facility]2017-01-24 16:51:00





             Test Item    Value        Reference Range Interpretation Comments

 

             Calcium Lvl (test code = Calcium Lvl) 8.4          8.5-10.5        

          



[facility]2017-01-24 16:51:00





             Test Item    Value        Reference Range Interpretation Comments

 

             AGAP (test code = AGAP) 11.3         10.0-20.0                 



[facility]2017-01-24 16:51:00





             Test Item    Value        Reference Range Interpretation Comments

 

             Glucose Lvl (test code = Glucose Lvl) 109          70-99           

          



[facility]2017-01-24 16:51:00





             Test Item    Value        Reference Range Interpretation Comments

 

             eGFR (test code = eGFR) 52                                     



[facility]2017-01-24 16:51:00





             Test Item    Value        Reference Range Interpretation Comments

 

             Creatinine Lvl (test code = Creatinine 1.15         0.50-1.40      

           



             Lvl)                                                



[facility]2017-01-24 16:51:00





             Test Item    Value        Reference Range Interpretation Comments

 

             BUN (test code = BUN) 16           -                      



[facility]2017-01-24 16:51:00





             Test Item    Value        Reference Range Interpretation Comments

 

             Chloride Lvl (test code = Chloride Lvl) 109                  

            



[facility]2017-01-24 16:51:00





             Test Item    Value        Reference Range Interpretation Comments

 

             Potassium Lvl (test code = Potassium 4.3          3.5-5.1          

         



             Lvl)                                                



[facility]2017-01-24 16:51:00





             Test Item    Value        Reference Range Interpretation Comments

 

             Sodium Lvl (test code = Sodium Lvl) 144          135-145           

        



[facility]2017-01-24 16:51:00





             Test Item    Value        Reference Range Interpretation Comments

 

             CO2 (test code = CO2) 28           -                     



[facility]2017-01-24 16:51:00





             Test Item    Value        Reference Range Interpretation Comments

 

             Calcium Lvl (test code = Calcium Lvl) 8.4          8.5-10.5        

          



[facility]2017-01-24 16:51:00





             Test Item    Value        Reference Range Interpretation Comments

 

             AGAP (test code = AGAP) 11.3         10.0-20.0                 



[facility]2017-01-24 16:51:00





             Test Item    Value        Reference Range Interpretation Comments

 

             Glucose Lvl (test code = Glucose Lvl) 109          70-99           

          



[facility]2017-01-24 16:51:00





             Test Item    Value        Reference Range Interpretation Comments

 

             eGFR (test code = eGFR) 52                                     



[facility]2017-01-24 16:51:00





             Test Item    Value        Reference Range Interpretation Comments

 

             Creatinine Lvl (test code = Creatinine 1.15         0.50-1.40      

           



             Lvl)                                                



[facility]2017-01-24 16:51:00





             Test Item    Value        Reference Range Interpretation Comments

 

             BUN (test code = BUN) 16           -                      



[facility]2017-01-24 16:51:00





             Test Item    Value        Reference Range Interpretation Comments

 

             Chloride Lvl (test code = Chloride Lvl) 109                  

            



[facility]2017-01-24 16:51:00





             Test Item    Value        Reference Range Interpretation Comments

 

             Potassium Lvl (test code = Potassium 4.3          3.5-5.1          

         



             Lvl)                                                



[facility]2017-01-24 16:51:00





             Test Item    Value        Reference Range Interpretation Comments

 

             Sodium Lvl (test code = Sodium Lvl) 144          135-145           

        



[facility]2017-01-24 16:51:00





             Test Item    Value        Reference Range Interpretation Comments

 

             CO2 (test code = CO2) 28           -32                     



[facility]2017-01-24 16:51:00





             Test Item    Value        Reference Range Interpretation Comments

 

             Calcium Lvl (test code = Calcium Lvl) 8.4          8.5-10.5        

          



[facility]2017-01-24 16:51:00





             Test Item    Value        Reference Range Interpretation Comments

 

             AGAP (test code = AGAP) 11.3         10.0-20.0                 



[facility]2017-01-24 16:51:00





             Test Item    Value        Reference Range Interpretation Comments

 

             Glucose Lvl (test code = Glucose Lvl) 109          70-99           

          



[facility]2017-01-24 16:51:00





             Test Item    Value        Reference Range Interpretation Comments

 

             eGFR (test code = eGFR) 52                                     



[facility]2017-01-24 16:51:00





             Test Item    Value        Reference Range Interpretation Comments

 

             Creatinine Lvl (test code = Creatinine 1.15         0.50-1.40      

           



             Lvl)                                                



[facility]2017-01-24 16:51:00





             Test Item    Value        Reference Range Interpretation Comments

 

             BUN (test code = BUN) 16           -                      



[facility]2017-01-24 16:51:00





             Test Item    Value        Reference Range Interpretation Comments

 

             Chloride Lvl (test code = Chloride Lvl) 109                  

            



[facility]2017-01-24 16:51:00





             Test Item    Value        Reference Range Interpretation Comments

 

             Potassium Lvl (test code = Potassium 4.3          3.5-5.1          

         



             Lvl)                                                



[facility]2017-01-24 16:51:00





             Test Item    Value        Reference Range Interpretation Comments

 

             Sodium Lvl (test code = Sodium Lvl) 144          135-145           

        



[facility]2017-01-24 16:51:00





             Test Item    Value        Reference Range Interpretation Comments

 

             CO2 (test code = CO2) 28           24-32                     



[facility]2017-01-24 16:51:00





             Test Item    Value        Reference Range Interpretation Comments

 

             Calcium Lvl (test code = Calcium Lvl) 8.4          8.5-10.5        

          



[facility]2017-01-24 16:51:00





             Test Item    Value        Reference Range Interpretation Comments

 

             AGAP (test code = AGAP) 11.3         10.0-20.0                 



[facility]2017-01-24 16:51:00





             Test Item    Value        Reference Range Interpretation Comments

 

             Glucose Lvl (test code = Glucose Lvl) 109          70-99           

          



[facility]2017-01-24 16:51:00





             Test Item    Value        Reference Range Interpretation Comments

 

             eGFR (test code = eGFR) 52                                     



[facility]

## 2022-12-24 NOTE — RAD REPORT
EXAM DESCRIPTION:  RAD - Chest Single View - 12/24/2022 8:53 am

 

CLINICAL HISTORY:  SOB

Chest pain.

 

COMPARISON:  Chest Single View dated 12/3/2022; Chest Single View dated 11/11/2022; Chest Single View
 dated 10/13/2022; Chest Single View dated 7/31/2022

 

FINDINGS:  Portable technique limits examination quality.

 

Chronically elevated right hemidiaphragm is noted. Mild pulmonary edema suspected. The heart is moder
ately enlarged. Left-sided venous catheter has tip in the right atrium.

 

IMPRESSION:  Mild CHF versus volume overload pattern.

## 2022-12-24 NOTE — ER
Nurse's Notes                                                                                     

 [facility]                                                                 

Name: Juli Rushing                                                                             

Age: 67 yrs                                                                                       

Sex: Female                                                                                       

: 1955                                                                                   

MRN: [id number]                                                                                   

Arrival Date: 2022                                                                          

Time: 07:54                                                                                       

Account#: [id number]                                                                            

Bed 7                                                                                             

Private MD:                                                                                       

Diagnosis: Fluid overload;ESRD needing dialysis;Acute respiratory failure with hypoxia            

                                                                                                  

Presentation:                                                                                     

                                                                                             

07:56 Chief complaint: EMS states: Pt from dialysis, c/o SOB, did not receive treatment. Was  ph  

      90% RA at dialysis center, placed on 2 L. Pt is in a-fib w/ rate up to 140's, has hx of     

      a-fib. Pt also c/o some chest pain and nausea, IV to RAC, 4 mg Zofran given.                

      Coronavirus screen: Vaccine status: Patient reports being unvaccinated. Ebola Screen:       

      No symptoms or risks identified at this time. Initial Sepsis Screen: Does the patient       

      meet any 2 criteria? No. Patient's initial sepsis screen is negative. Does the patient      

      have a suspected source of infection? No. Patient's initial sepsis screen is negative.      

      Risk Assessment: Do you want to hurt yourself or someone else?. Onset of symptoms was       

      2022.                                                                          

07:56 Method Of Arrival: EMS: [facility]                                                ph  

07:56 Acuity: MIRTHA 2                                                                           ph  

                                                                                                  

Historical:                                                                                       

- Allergies:                                                                                      

08:01 Bactrim;                                                                                ph  

08:01 cefepime;                                                                               ph  

08:01 Codeine;                                                                                ph  

08:01 Levofloxacin;                                                                           ph  

08:01 Morphine; itching;                                                                      ph  

08:01 PENICILLINS;                                                                            ph  

08:01 QUINOLONES;                                                                             ph  

- Home Meds:                                                                                      

08:01 Alphagan P ophthalmic (eye) 1 drop twice a day [Active]; apixaban 2.5 mg Oral tab 1 tab ph  

      2 times per day [Active]; bumetanide 2 mg Oral tab 1 tab once daily [Active]; digoxin       

      125 mcg (0.125 mg) Oral tab 1 tab 3 X weekly [Active]; metoprolol tartrate 25 mg Oral       

      tab 1 tab 2 times per day [Active]; midodrine 5 mg Oral tab PRN with dialysis [Active];     

      midodrine 10 mg Oral tab 1 tab Q8H, don't take before dialysis, bring pills to              

      traetment. [Active]; pantoprazole 40 mg Oral TbEC 1 tab once daily [Active]; midodrine      

      Oral [Active];                                                                              

- PMHx:                                                                                           

08:01 Atrial fibrillation; Congestive heart failure; Dialysis; High Cholesterol; Hypertensive ph  

      disorder; stage 4 kidney failure;                                                           

- PSHx:                                                                                           

08:01 back surgery; Dialysis catheter LEFT upper chest;                                       ph  

                                                                                                  

- Immunization history:: Adult Immunizations unknown.                                             

- Social history:: Smoking status: Patient denies any tobacco usage or history of.                

                                                                                                  

                                                                                                  

Screenin:22 [facility] ED Fall Risk Assessment (Adult) History of falling in the last 3 months,       db  

      including since admission No falls in past 3 months (0 pts) Confusion or Disorientation     

      No (0 pts) Intoxicated or Sedated No (0 pts) Impaired Gait No (0 pts) Mobility Assist       

      Device Used Yes (1 pt) Altered Elimination No (0 pt) Score/Fall Risk Level 0 - 2 = Low      

      Risk Oriented to surroundings, Hourly rounding (assess needs \T\ fall precautionary         

      measures) done. Abuse screen: Denies threats or abuse. Denies injuries from another.        

      Nutritional screening: No deficits noted. Tuberculosis screening: No symptoms or risk       

      factors identified.                                                                         

                                                                                                  

Assessment:                                                                                       

08:00 Reassessment: Patient appears in no apparent distress at this time. Patient and/or      db  

      family updated on plan of care and expected duration. Pain level reassessed. Patient is     

      alert, oriented x 3, equal unlabored respirations, skin warm/dry/pink. Reassessment:        

      PATIENT DID NOT RECEIVE DIALYSIS. General: Appears in no apparent distress.                 

      comfortable, Behavior is calm, cooperative, appropriate for age. Pain: Complains of         

      pain in chest. Neuro: No deficits noted. Level of Consciousness is awake, alert, obeys      

      commands, Oriented to person, place, time, situation, Appropriate for age Speech is         

      normal. Cardiovascular: Reports chest pain, Capillary refill < 3 seconds Rhythm is          

      atrial fibrillation. Respiratory: Reports shortness of breath at rest Airway is patent      

      Respiratory effort is even, unlabored, Respiratory pattern is regular, symmetrical. GI:     

      No deficits noted. No signs and/or symptoms were reported involving the                     

      gastrointestinal system. GI: Abdomen is round distended. : No deficits noted. No          

      signs and/or symptoms were reported regarding the genitourinary system. :. EENT: No       

      deficits noted. No signs and/or symptoms were reported regarding the EENT system. Derm:     

      No deficits noted. No signs and/or symptoms reported regarding the dermatologic system.     

      Musculoskeletal: No deficits noted. No signs and/or symptoms reported regarding the         

      musculoskeletal system.                                                                     

08:22 Reassessment: RT AT BEDSIDE FOR ABG.                                                    db  

09:00 Reassessment: Patient appears in no apparent distress at this time. Patient and/or      db  

      family updated on plan of care and expected duration. Pain level reassessed. Patient is     

      alert, oriented x 3, equal unlabored respirations, skin warm/dry/pink. Patient states       

      symptoms have improved.                                                                     

10:00 Reassessment: Patient appears in no apparent distress at this time. No changes from     db  

      previously documented assessment. Patient and/or family updated on plan of care and         

      expected duration. Pain level reassessed. Patient is alert, oriented x 3, equal             

      unlabored respirations, skin warm/dry/pink.                                                 

11:00 Reassessment: Patient appears in no apparent distress at this time. No changes from     db  

      previously documented assessment. Patient and/or family updated on plan of care and         

      expected duration. Pain level reassessed.                                                   

12:20 Reassessment: Patient appears in no apparent distress at this time. No changes from     db  

      previously documented assessment. Patient and/or family updated on plan of care and         

      expected duration. Pain level reassessed. Patient is alert, oriented x 3, equal             

      unlabored respirations, skin warm/dry/pink. General: Appears Behavior is.                   

                                                                                                  

Vital Signs:                                                                                      

07:56  / 83; Pulse 135; Resp 18; Temp 98.4; Pulse Ox 86% on R/A; Weight 108.86 kg;      ph  

      Height 5 ft. 7 in. (170.18 cm);                                                             

08:00  / 102; Pulse 121; Resp 26; Pulse Ox 100% on 2 lpm NC;                            db  

08:30  / 105; Pulse 104; Resp 28; Pulse Ox 98% on 2 lpm NC;                             db  

09:00  / 70; Pulse 101; Resp 23; Pulse Ox 96% on 2 lpm NC;                              db  

10:00  / 74; Pulse 20; Resp 22; Pulse Ox 96% on 2 lpm NC;                               db  

11:00  / 90; Pulse 84; Resp 20; Pulse Ox 98% on 2 lpm NC;                               db  

12:00  / 63; Pulse 90; Resp 20; Pulse Ox 98% on 2 lpm NC;                               db  

07:56 Body Mass Index 37.59 (108.86 kg, 170.18 cm)                                            ph  

07:56 improved to 99% on 2L NC                                                                ph  

                                                                                                  

ED Course:                                                                                        

07:54 Patient arrived in ED.                                                                  em1 

07:56 Alondra Thompson MD is Attending Physician.                                           sd2 

08:01 Triage completed.                                                                       ph  

08:03 Arm band placed on Patient placed in an exam room, on a stretcher, on oxygen, on        ph  

      cardiac monitor, on pulse oximetry.                                                         

08:04 Tami Ya, RN is Primary Nurse.                                                  db  

08:15 Maintain EMS IV. Dressing intact. Good blood return noted. Site clean \T\ dry. Gauge \T\    db

      site: 20 G right AC. Flushed right antecubital.                                             

08:22 Oxygen administration via nasal cannula \T\ 2L/min Response to oxygen therapy: symptoms   db  

      improved.                                                                                   

08:55 XRAY Chest (1 view) In Process Unspecified.                                             EDMS

10:21 Anthony Elkins MD is Hospitalizing Provider.                                            sd2 

12:11 Patient has correct armband on for positive identification. Bed in low position. Call   db  

      light in reach. Side rails up X2. Client placed on continuous cardiac and pulse             

      oximetry monitoring. NIBP monitoring applied. Warm blanket given.                           

12:11 No provider procedures requiring assistance completed.                                  db  

12:22 Patient admitted, IV remains in place.                                                  db  

                                                                                                  

Administered Medications:                                                                         

08:21 Drug: Cardizem (diltiazem) 10 mg Route: IVP; Site: right antecubital;                   db  

12:17 Follow up: Response: No adverse reaction                                                db  

                                                                                                  

                                                                                                  

Medication:                                                                                       

08:29 VIS not applicable for this client.                                                     db  

                                                                                                  

Outcome:                                                                                          

10:21 Decision to Hospitalize by Provider.                                                    sd2 

12:22 Admitted to Med/surg accompanied by tech, via wheelchair.                               db  

12:22 Condition: stable                                                                           

12:22 Instructed on the need for admit.                                                           

12:40 Patient left the ED.                                                                    db  

                                                                                                  

Signatures:                                                                                       

Dispatcher [employer]                           EDMS                                                 

Daljit Hutchinson                               em1                                                  

Niyah Mcguire RN                      RN   ph                                                   

Alondra Thompson MD MD   sd2                                                  

Tami Ya, ELPIDIO                    RN   db                                                   

                                                                                                  

**************************************************************************************************

## 2022-12-24 NOTE — P.HP
Certification for Inpatient


Patient admitted to: Observation


With expected LOS: <2 Midnights


Patient will require the following post-hospital care: None


Practitioner: I am a practitioner with admitting privileges, knowledge of 

patient current condition, hospital course, and medical plan of care.


Services: Services provided to patient in accordance with Admission requirements

found in Title 42 Section 412.3 of the Code of Federal Regulations





Patient History


Date of Service: 12/24/22


Primary Care Provider: Brittni


Reason for admission: Chf exacerbation 


History of Present Illness: 


Patient is an office patient of mine.  She has a history of chf and esrd.  Went 

to dialysis today. She was found to be hypoxic.  Was sent to the ER.  CXR showed

bilateral pleural infiltrates.  The patient does get fluid overloaded quite 

frequently.   She has some shortness of breath 


Allergies





cefepime Allergy (Verified 01/05/22 06:30)


   Hives


codeine Allergy (Verified 07/30/22 05:03)


   Itching


levofloxacin Allergy (Verified 01/05/22 06:30)


   Hives/Rash


morphine Allergy (Verified 07/30/22 05:03)


   Itching


Penicillins Allergy (Verified 01/05/22 06:30)


   Hives/Rash


sulfamethoxazole [From Bactrim] Allergy (Verified 07/30/22 05:03)


   Hives


trimethoprim [From Bactrim] Allergy (Verified 07/30/22 05:03)


   Hives





Home Medications: 








Apixaban [Eliquis] 2.5 mg PO BID 07/29/22 


Bumetanide 1 tab PO DAILY 07/29/22 


Digoxin [Lanoxin] 1 tab PO T,TH,S 07/29/22 


Metoprolol Tartrate 1 tab PO BID 07/29/22 


Midodrine HCl 1 tab PO TID 07/29/22 


Pantoprazole [Protonix Tab*] 1 tab PO BID 07/29/22 


Sevelamer Carbonate [Renvela] 800 mg PO TID 07/29/22 


Dicyclomine [Bentyl*] 1 tab PO TID PRN 11/11/22 


Ondansetron [Zofran (Odt)*] 8 mg PO TID PRN 11/11/22 


Tizanidine [Zanaflex*] 1 tab PO BID 11/11/22 








- Past Medical/Surgical History


Diabetic: No


-: Chronic hypotension


-: Hyperlipidemia


-: Chronic anticoagulation use


-: History of nephrolithiasis requiring stent placement


-: Recurrent UTI


-: End-stage renal disease


-: CHF


-: HTN


-: A-Fib


-: tubal ligation


-: dialysis port


-: cholecystectomy


-: right broken wrist


-: lasik


-: cataracts removed





- Family History


  ** Sister


-: Cancer


Notes: per pt, sister has lung cancer and is being treated for a "spot on her 

brain"





- Social History


Alcohol use: No


CD- Drugs: No


Caffeine use: Yes





Review of Systems


10-point ROS is otherwise unremarkable


Respiratory: Shortness of Breath





Physical Examination





- Physical Exam


General: Alert, In no apparent distress


HEENT: Atraumatic, PERRLA, Mucous membr. moist/pink, EOMI, Sclerae nonicteric


Neck: Supple, 2+ carotid pulse no bruit, No LAD, Without JVD or thyroid 

abnormality


Respiratory: Clear to auscultation bilaterally, Normal air movement, Cra

ckles/rales (mild at the bases of the lungs)


Cardiovascular: Regular rate/rhythm, Normal S1 S2


Gastrointestinal: Normal bowel sounds, No tenderness


Musculoskeletal: No tenderness


Integumentary: No rashes


Neurological: Normal gait, Normal speech, Normal strength at 5/5 x4 extr, Normal

 tone, Normal affect


Lymphatics: No axilla or inguinal lymphadenopathy





- Studies


Laboratory Data (last 24 hrs)





12/24/22 08:15: Sodium 135 L, Potassium 3.7, BUN 32 H, Creatinine 6.53 H*, 

Glucose 114 H, Magnesium 2.2, Total Bilirubin 1.1 H, AST 5 L, ALT 11 L, Alkaline

 Phosphatase 133 H


12/24/22 08:15: WBC 10.00, Hgb 8.9 L, Hct 27.8 L, Plt Count 187








Assessment and Plan





- Problems (Diagnosis)


(1) Acute diastolic (congestive) heart failure


Current Visit: No   Status: Acute   


Plan: 


will have Dr. Bernal run her dialysis.  If the patient has improved we can 

discharge her home tomorrow. 








(2) ESRD (end stage renal disease) on dialysis


Current Visit: No   Status: Chronic   


Plan: 


Dr. Bernal to perform the dialysis





Discharge Plan: Home


Plan to discharge in: 24 Hours





- Advance Directives


Does patient have a Living Will: No


Does patient have a Durable POA for Healthcare: No





- Code Status/Comfort Care


Code Status Assessed: No


Code Status: Full Code


Critical Care: No


Time Spent Managing Pts Care (In Minutes): 40

## 2022-12-24 NOTE — EDPHYS
Physician Documentation                                                                           

 [facility]                                                                 

Name: Juli Rushing                                                                             

Age: 67 yrs                                                                                       

Sex: Female                                                                                       

: 1955                                                                                   

MRN: [id number]                                                                                   

Arrival Date: 2022                                                                          

Time: 07:54                                                                                       

Account#: [id number]                                                                            

Bed 7                                                                                             

Private MD:                                                                                       

ED Physician Alondra Thompson                                                                    

HPI:                                                                                              

                                                                                             

08:04 This 67 yrs old Female presents to ER via EMS with complaints of SOB.                   sd2 

08:04 66 yo F presents with CC via EMS of SOB. Reports ongoing and worsening overnight to the sd2 

      point where she could not sleep. Went to dialysis this morning and worsened and was         

      sent to ER. Found to be 85% on room air and currently on 2L NC. Reports ongoing             

      intermittently productive cough. Denies fever or recent sick contacts. Denies chest         

      pain. Reports hx of A-fib on Eliquis. Taken off her Metoprolol due to bradycardia           

      issues and hypotension. She is currently on midodrine and has taken all of her morning      

      medications today..                                                                         

                                                                                                  

Historical:                                                                                       

- Allergies:                                                                                      

08:01 Bactrim;                                                                                ph  

08:01 cefepime;                                                                               ph  

08:01 Codeine;                                                                                ph  

08:01 Levofloxacin;                                                                           ph  

08:01 Morphine; itching;                                                                      ph  

08:01 PENICILLINS;                                                                            ph  

08:01 QUINOLONES;                                                                             ph  

- Home Meds:                                                                                      

08:01 Alphagan P ophthalmic (eye) 1 drop twice a day [Active]; apixaban 2.5 mg Oral tab 1 tab ph  

      2 times per day [Active]; bumetanide 2 mg Oral tab 1 tab once daily [Active]; digoxin       

      125 mcg (0.125 mg) Oral tab 1 tab 3 X weekly [Active]; metoprolol tartrate 25 mg Oral       

      tab 1 tab 2 times per day [Active]; midodrine 5 mg Oral tab PRN with dialysis [Active];     

      midodrine 10 mg Oral tab 1 tab Q8H, don't take before dialysis, bring pills to              

      traetment. [Active]; pantoprazole 40 mg Oral TbEC 1 tab once daily [Active]; midodrine      

      Oral [Active];                                                                              

- PMHx:                                                                                           

08:01 Atrial fibrillation; Congestive heart failure; Dialysis; High Cholesterol; Hypertensive ph  

      disorder; stage 4 kidney failure;                                                           

- PSHx:                                                                                           

08:01 back surgery; Dialysis catheter LEFT upper chest;                                       ph  

                                                                                                  

- Immunization history:: Adult Immunizations unknown.                                             

- Social history:: Smoking status: Patient denies any tobacco usage or history of.                

                                                                                                  

                                                                                                  

ROS:                                                                                              

08:04 Constitutional: Negative for fever, chills, and weight loss, Eyes: Negative for injury, sd2 

      pain, redness, and discharge, Cardiovascular: Negative for chest pain, palpitations,        

      and edema.                                                                                  

08:04 Abdomen/GI: Negative for abdominal pain, nausea, Positive for post-tussive emesis and       

      chronic diarrhea. MS/Extremity: Negative for injury and deformity, Skin: Negative for       

      injury, rash, and discoloration, Neuro: Negative for headache, numbness and tingling.       

08:04 Respiratory: Positive for cough, shortness of breath, Negative for wheezing.                

                                                                                                  

Exam:                                                                                             

08:04 Constitutional:  This is a well developed, well nourished patient who is awake, alert,  sd2 

      and in no acute distress. Head/Face:  Normocephalic, atraumatic. Eyes:  EOMI, normal        

      conjunctiva bilaterally Chest/axilla:  Normal chest wall appearance and motion.             

      Nontender with no deformity.  HD catheter in place to L chest wall. Loop recorder also      

      in place. Cardiovascular:  Tachycardic rate and irregularly irregular rhythm with a         

      normal S1 and S2.  No gallops, murmurs, or rubs.  2+ distal pulses. Respiratory:  Lungs     

      have equal breath sounds bilaterally, diminished BS at bilateral bases.  No rales,          

      rhonchi or wheezes noted.  No increased work of breathing, no retractions or nasal          

      flaring. Abdomen/GI:  Soft, non-tender, with normal bowel sounds. No guarding or            

      rebound.  No evidence of tenderness throughout. Skin:  Warm, dry with normal turgor.        

      Normal color with no rashes, no lesions, and no evidence of cellulitis. MS/ Extremity:      

      Pulses equal, no cyanosis.  Neurovascular intact.  Full, normal range of motion.            

      Ambulatory without difficulty. Psych:  Awake, alert, with orientation to person, place      

      and time.  Behavior, mood, and affect are within normal limits.                             

08:04 ECG was reviewed by the Attending Physician. A-fib w/RVR, rate 122, no STEMI criteria   sd2 

                                                                                                  

Vital Signs:                                                                                      

07:56  / 83; Pulse 135; Resp 18; Temp 98.4; Pulse Ox 86% on R/A; Weight 108.86 kg;      ph  

      Height 5 ft. 7 in. (170.18 cm);                                                             

08:00  / 102; Pulse 121; Resp 26; Pulse Ox 100% on 2 lpm NC;                            db  

08:30  / 105; Pulse 104; Resp 28; Pulse Ox 98% on 2 lpm NC;                             db  

09:00  / 70; Pulse 101; Resp 23; Pulse Ox 96% on 2 lpm NC;                              db  

10:00  / 74; Pulse 20; Resp 22; Pulse Ox 96% on 2 lpm NC;                               db  

11:00  / 90; Pulse 84; Resp 20; Pulse Ox 98% on 2 lpm NC;                               db  

12:00  / 63; Pulse 90; Resp 20; Pulse Ox 98% on 2 lpm NC;                               db  

07:56 Body Mass Index 37.59 (108.86 kg, 170.18 cm)                                            ph  

07:56 improved to 99% on 2L NC                                                                ph  

                                                                                                  

MDM:                                                                                              

07:56 Patient medically screened.                                                             sd2 

08:07 Differential Diagnosis Differential diagnosis includes but is not limited to: ACS,      sd2 

      DVT/PE, pneumothorax, dissection, musculoskeletal, anxiety, anemia, electrolyte             

      abnormality, pneumonia, CHF, COPD among others. Data reviewed: vital signs, nurses          

      notes, EMS record.                                                                          

10:18 Data reviewed: lab test result(s), EKG, radiologic studies. Counseling: I had a         sd2 

      detailed discussion with the patient and/or guardian regarding: the historical points,      

      exam findings, and any diagnostic results supporting the discharge/admit diagnosis, lab     

      results, radiology results, the need for further work-up and treatment in the hospital.     

      ED course: Discussed case with Dr. Comer, Nephrology, who will arrange for dialysis for      

      patient. Labs and imaging consistent with fluid overload with hypoxia. Dr. Elkins to         

      admit. .                                                                                    

                                                                                                  

                                                                                             

08:03 Order name: CBC with Diff; Complete Time: 08:49                                         sd2 

                                                                                             

08:03 Order name: CMP; Complete Time: 10:04                                                   sd2 

                                                                                             

08:03 Order name: Magnesium; Complete Time: 10:04                                             sd2 

                                                                                             

08:03 Order name: Troponin High Sensitivity; Complete Time: 10:04                             sd2 

                                                                                             

08:03 Order name: BNP; Complete Time: 10:04                                                   sd2 

                                                                                             

08:03 Order name: ABG; Complete Time: 10:04                                                   sd2 

                                                                                             

08:03 Order name: COVID-19/FLU A+B; Complete Time: 10:04                                      sd2 

                                                                                             

11:34 Order name: CBC with Automated Diff                                                     EDMS

                                                                                             

11:34 Order name: CBC with Automated Diff                                                     EDMS

                                                                                             

11:34 Order name: CBC with Automated Diff                                                     EDMS

                                                                                             

11:34 Order name: CBC with Automated Diff                                                     EDMS

                                                                                             

11:34 Order name: Comprehensive Metabolic Panel                                               EDMS

                                                                                             

11:34 Order name: Comprehensive Metabolic Panel                                               EDMS

                                                                                             

11:34 Order name: Comprehensive Metabolic Panel                                               EDMS

                                                                                             

08:03 Order name: EKG - Nurse/Tech; Complete Time: 08:04                                      sd2 

                                                                                             

08:03 Order name: XRAY Chest (1 view); Complete Time: 10:29                                   sd2 

                                                                                             

11:32 Order name: CONS Physician Consult                                                      EDMS

                                                                                             

11:32 Order name: Renal                                                                       EDMS

                                                                                             

11:34 Order name: Comprehensive Metabolic Panel                                               EDMS

                                                                                                  

Administered Medications:                                                                         

08:21 Drug: Cardizem (diltiazem) 10 mg Route: IVP; Site: right antecubital;                   db  

12:17 Follow up: Response: No adverse reaction                                                db  

                                                                                                  

                                                                                                  

Disposition Summary:                                                                              

22 10:21                                                                                    

Hospitalization Ordered                                                                           

      Hospitalization Status: Observation                                                     sd2 

      Provider: Anthony Elkins                                                                 sd2 

      Location: Telemetry/MedSurg (observation)                                               sd2 

      Condition: Stable                                                                       sd2 

      Problem: an acute exacerbation                                                          sd2 

      Symptoms: have improved                                                                 sd2 

      Bed/Room Type: Standard                                                                 sd2 

      Room Assignment: 207(22 11:55)                                                    em1 

      Diagnosis                                                                                   

        - Fluid overload                                                                      sd2 

        - ESRD needing dialysis                                                               sd2 

        - Acute respiratory failure with hypoxia                                              sd2 

      Forms:                                                                                      

        - Medication Reconciliation Form                                                      sd2 

        - SBAR form                                                                           sd2 

Signatures:                                                                                       

Dispatcher [employer]                           EDMS                                                 

Daljit Hutchinson                               em1                                                  

Niyah Mcguire RN                      RN                                                      

Alondra Thompson MD MD   sd2                                                  

Tami Ya RN                    RN   db                                                   

                                                                                                  

Corrections: (The following items were deleted from the chart)                                    

11:55 10:21 sd2                                                                               em1 

                                                                                                  

**************************************************************************************************

## 2022-12-25 VITALS — OXYGEN SATURATION: 95 %

## 2022-12-25 VITALS — TEMPERATURE: 98 F | DIASTOLIC BLOOD PRESSURE: 57 MMHG | SYSTOLIC BLOOD PRESSURE: 104 MMHG

## 2022-12-25 LAB
ALBUMIN SERPL BCP-MCNC: 2.7 G/DL (ref 3.4–5)
ALP SERPL-CCNC: 136 U/L (ref 45–117)
ALT SERPL W P-5'-P-CCNC: 11 U/L (ref 13–56)
AST SERPL W P-5'-P-CCNC: 7 U/L (ref 15–37)
BUN BLD-MCNC: 21 MG/DL (ref 7–18)
GLUCOSE SERPLBLD-MCNC: 135 MG/DL (ref 74–106)
HCT VFR BLD CALC: 27.4 % (ref 36–45)
LYMPHOCYTES # SPEC AUTO: 1 K/UL (ref 0.7–4.9)
MCV RBC: 98.4 FL (ref 80–100)
PMV BLD: 7.8 FL (ref 7.6–11.3)
POTASSIUM SERPL-SCNC: 4 MMOL/L (ref 3.5–5.1)
RBC # BLD: 2.78 M/UL (ref 3.86–4.86)

## 2022-12-25 RX ADMIN — MIDODRINE HYDROCHLORIDE SCH MG: 5 TABLET ORAL at 09:20

## 2022-12-25 RX ADMIN — PANTOPRAZOLE SODIUM SCH MG: 40 TABLET, DELAYED RELEASE ORAL at 09:20

## 2022-12-25 RX ADMIN — TIZANIDINE SCH MG: 4 TABLET ORAL at 09:21

## 2022-12-25 RX ADMIN — MORPHINE SULFATE PRN MG: 4 INJECTION, SOLUTION INTRAMUSCULAR; INTRAVENOUS at 03:37

## 2022-12-25 RX ADMIN — SEVELAMER CARBONATE SCH MG: 800 TABLET, FILM COATED ORAL at 09:20

## 2022-12-25 RX ADMIN — APIXABAN SCH MG: 2.5 TABLET, FILM COATED ORAL at 09:21

## 2022-12-25 NOTE — P.DS
Admission Date: 12/24/22


Discharge Date: 12/25/22


Primary Care Provider: Brittni


Disposition: ROUTINE DISCHARGE


Discharge Condition: GOOD


Reason for Admission: Chf exacerbation 





- Problems


(1) Acute diastolic (congestive) heart failure


Current Visit: No   Status: Acute   





(2) ESRD (end stage renal disease) on dialysis


Current Visit: No   Status: Chronic   


Brief History of Present Illness: 


Patient is an office patient of mine.  She has a history of chf and esrd.  Went 

to dialysis today. She was found to be hypoxic.  Was sent to the ER.  CXR showed

bilateral pleural infiltrates.  The patient does get fluid overloaded quite 

frequently.   She has some shortness of breath 


Hospital Course: 





Patient was admitted.  Had Dr. Stinson do her dialysis.  3.5lts removed.  She 

states she's ready to go home.  Will discharge her and have her follow up in the

office.  Thank you for allowing me to take part in her care. 


Vital Signs/Physical Exam: 














Temp Pulse Resp BP Pulse Ox


 


 98 F   86   19   104/57 L  95 


 


 12/25/22 08:00  12/25/22 08:00  12/25/22 08:00  12/25/22 08:00  12/25/22 08:00








General: Alert, In no apparent distress


HEENT: Atraumatic, PERRLA, EOMI


Neck: Supple, JVD not distended


Respiratory: Clear to auscultation bilaterally, Normal air movement


Cardiovascular: Regular rate/rhythm, Normal S1 S2


Gastrointestinal: Normal bowel sounds, No tenderness


Musculoskeletal: No tenderness


Integumentary: No rashes


Neurological: Normal speech, Normal tone, Normal affect


Lymphatics: No axilla or inguinal lymphadenopathy


Laboratory Data at Discharge: 














WBC  11.80 K/uL (4.3-10.9)  H  12/25/22  06:02    


 


Hgb  8.5 g/dL (12.0-15.0)  L  12/25/22  06:02    


 


Hct  27.4 % (36.0-45.0)  L  12/25/22  06:02    


 


Plt Count  191 K/uL (152-406)   12/25/22  06:02    


 


Sodium  140 mmol/L (136-145)  D 12/25/22  06:02    


 


Potassium  4.0 mmol/L (3.5-5.1)   12/25/22  06:02    


 


BUN  21 mg/dL (7-18)  H  12/25/22  06:02    


 


Creatinine  5.12 mg/dL (0.55-1.02)  H*  12/25/22  06:02    


 


Glucose  135 mg/dL ()  H  12/25/22  06:02    


 


Magnesium  2.2 mg/dL (1.6-2.4)   12/24/22  08:15    


 


Total Bilirubin  1.0 mg/dL (0.2-1.0)   12/25/22  06:02    


 


AST  7 U/L (15-37)  L  12/25/22  06:02    


 


ALT  11 U/L (13-56)  L  12/25/22  06:02    


 


Alkaline Phosphatase  136 U/L ()  H  12/25/22  06:02    








Home Medications: 








Apixaban [Eliquis] 2.5 mg PO BID 07/29/22 


Bumetanide 1 tab PO DAILY 07/29/22 


Digoxin [Lanoxin] 1 tab PO T,TH,S 07/29/22 


Metoprolol Tartrate 1 tab PO BID 07/29/22 


Midodrine HCl 1 tab PO TID 07/29/22 


Pantoprazole [Protonix Tab*] 1 tab PO BID 07/29/22 


Sevelamer Carbonate [Renvela] 800 mg PO TID 07/29/22 


Dicyclomine [Bentyl*] 1 tab PO TID PRN 11/11/22 


Ondansetron [Zofran (Odt)*] 8 mg PO TID PRN 11/11/22 


Tizanidine [Zanaflex*] 1 tab PO BID 11/11/22 





Diet: Renal


Activity: Ad susan


Followup: 


Anthony Elkins MD [Primary Care Provider] - 1 Week


Physician Review: Patient Assessed, Agree with Above Assessment and Plan


Time spent managing pt's care (in minutes): 30

## 2022-12-28 ENCOUNTER — HOSPITAL ENCOUNTER (INPATIENT)
Dept: HOSPITAL 97 - ER | Age: 67
LOS: 2 days | Discharge: LEFT BEFORE DISCHARGE | DRG: 291 | End: 2022-12-30
Attending: INTERNAL MEDICINE | Admitting: INTERNAL MEDICINE
Payer: COMMERCIAL

## 2022-12-28 VITALS — BODY MASS INDEX: 36 KG/M2

## 2022-12-28 DIAGNOSIS — I95.89: ICD-10-CM

## 2022-12-28 DIAGNOSIS — Z99.81: ICD-10-CM

## 2022-12-28 DIAGNOSIS — Z79.01: ICD-10-CM

## 2022-12-28 DIAGNOSIS — I48.0: ICD-10-CM

## 2022-12-28 DIAGNOSIS — I13.2: Primary | ICD-10-CM

## 2022-12-28 DIAGNOSIS — Z98.51: ICD-10-CM

## 2022-12-28 DIAGNOSIS — E78.5: ICD-10-CM

## 2022-12-28 DIAGNOSIS — Z99.2: ICD-10-CM

## 2022-12-28 DIAGNOSIS — N18.6: ICD-10-CM

## 2022-12-28 DIAGNOSIS — I50.33: ICD-10-CM

## 2022-12-28 LAB
ALBUMIN SERPL BCP-MCNC: 3.2 G/DL (ref 3.4–5)
ALP SERPL-CCNC: 155 U/L (ref 45–117)
ALT SERPL W P-5'-P-CCNC: 13 U/L (ref 13–56)
AST SERPL W P-5'-P-CCNC: 7 U/L (ref 15–37)
BUN BLD-MCNC: 41 MG/DL (ref 7–18)
GLUCOSE SERPLBLD-MCNC: 125 MG/DL (ref 74–106)
HCT VFR BLD CALC: 29.1 % (ref 36–45)
LYMPHOCYTES # SPEC AUTO: 1 K/UL (ref 0.7–4.9)
MAGNESIUM SERPL-MCNC: 2.2 MG/DL (ref 1.6–2.4)
MCV RBC: 96.1 FL (ref 80–100)
PMV BLD: 7.8 FL (ref 7.6–11.3)
POTASSIUM SERPL-SCNC: 4.6 MMOL/L (ref 3.5–5.1)
RBC # BLD: 3.03 M/UL (ref 3.86–4.86)
TROPONIN I SERPL HS-MCNC: 23.4 PG/ML (ref ?–58.9)
TSH SERPL DL<=0.05 MIU/L-ACNC: 0.98 UIU/ML (ref 0.36–3.74)

## 2022-12-28 PROCEDURE — 36415 COLL VENOUS BLD VENIPUNCTURE: CPT

## 2022-12-28 PROCEDURE — 86706 HEP B SURFACE ANTIBODY: CPT

## 2022-12-28 PROCEDURE — 96375 TX/PRO/DX INJ NEW DRUG ADDON: CPT

## 2022-12-28 PROCEDURE — 99285 EMERGENCY DEPT VISIT HI MDM: CPT

## 2022-12-28 PROCEDURE — 87811 SARS-COV-2 COVID19 W/OPTIC: CPT

## 2022-12-28 PROCEDURE — 87340 HEPATITIS B SURFACE AG IA: CPT

## 2022-12-28 PROCEDURE — 93005 ELECTROCARDIOGRAM TRACING: CPT

## 2022-12-28 PROCEDURE — 84443 ASSAY THYROID STIM HORMONE: CPT

## 2022-12-28 PROCEDURE — 83735 ASSAY OF MAGNESIUM: CPT

## 2022-12-28 PROCEDURE — 71045 X-RAY EXAM CHEST 1 VIEW: CPT

## 2022-12-28 PROCEDURE — 96374 THER/PROPH/DIAG INJ IV PUSH: CPT

## 2022-12-28 PROCEDURE — 80053 COMPREHEN METABOLIC PANEL: CPT

## 2022-12-28 PROCEDURE — 85025 COMPLETE CBC W/AUTO DIFF WBC: CPT

## 2022-12-28 PROCEDURE — 90935 HEMODIALYSIS ONE EVALUATION: CPT

## 2022-12-28 PROCEDURE — 84484 ASSAY OF TROPONIN QUANT: CPT

## 2022-12-28 PROCEDURE — 83880 ASSAY OF NATRIURETIC PEPTIDE: CPT

## 2022-12-28 RX ADMIN — METOPROLOL TARTRATE SCH MG: 25 TABLET ORAL at 21:00

## 2022-12-28 RX ADMIN — APIXABAN SCH MG: 2.5 TABLET, FILM COATED ORAL at 21:00

## 2022-12-28 RX ADMIN — SEVELAMER CARBONATE SCH MG: 800 TABLET, FILM COATED ORAL at 17:00

## 2022-12-28 RX ADMIN — TIZANIDINE SCH MG: 4 TABLET ORAL at 22:00

## 2022-12-28 RX ADMIN — PANTOPRAZOLE SODIUM SCH MG: 40 TABLET, DELAYED RELEASE ORAL at 21:00

## 2022-12-28 NOTE — EDPHYS
Physician Documentation                                                                           

 [facility]                                                                 

Name: Juli Rushing                                                                             

Age: 67 yrs                                                                                       

Sex: Female                                                                                       

: 1955                                                                                   

MRN: [id number]                                                                                   

Arrival Date: 2022                                                                          

Time: 10:55                                                                                       

Account#: [id number]                                                                            

Bed 19                                                                                            

Private MD:                                                                                       

ED Physician Skyler Fabian                                                                     

HPI:                                                                                              

                                                                                             

12:51 This 67 yrs old Female presents to ER via Wheelchair with complaints of afib.           rt  

12:51 The patient has shortness of breath at rest. Onset: The symptoms/episode began/occurred rt  

      at an unknown time. Duration: The symptoms are continuous. The patient's shortness of       

      breath has no apparent modifying factors. Associated signs and symptoms: The patient        

      has no apparent associated signs or symptoms. Severity of symptoms: At their worst the      

      symptoms were moderate. Patient was admitted recently to the hospital, metoprolol was       

      stopped due to low blood pressures. Patient was seen at her cardiologist office today       

      found her to be in A. fib with RVR at a rate of the 160s. The patient reports a             

      shortness of breath but denies palpitations, chest pain with acute complaints. Symptoms     

      are moderate in severity, no other aggravating alleviating factors..                        

                                                                                                  

Historical:                                                                                       

- Allergies:                                                                                      

11:15 Bactrim;                                                                                ph  

11:15 cefepime;                                                                               ph  

11:15 Codeine;                                                                                ph  

11:15 Levofloxacin;                                                                           ph  

11:15 Morphine; itching;                                                                      ph  

11:15 PENICILLINS;                                                                            ph  

11:15 QUINOLONES;                                                                             ph  

- PMHx:                                                                                           

11:15 Atrial fibrillation; Congestive heart failure; Dialysis; High Cholesterol; Hypertensive ph  

      disorder; stage 4 kidney failure;                                                           

- PSHx:                                                                                           

11:15 back surgery; Dialysis catheter LEFT upper chest;                                       ph  

                                                                                                  

- Immunization history:: Adult Immunizations unknown.                                             

- Social history:: Smoking status: Patient denies any tobacco usage or history of.                

- Family history:: not pertinent.                                                                 

                                                                                                  

                                                                                                  

ROS:                                                                                              

12:51 Constitutional: Negative for fever, chills, and weight loss, Eyes: Negative for injury, rt  

      pain, redness, and discharge, ENT: Negative for injury, pain, and discharge, Neck:          

      Negative for injury, pain, and swelling, Cardiovascular: Negative for chest pain,           

      palpitations, and edema, Abdomen/GI: Negative for abdominal pain, nausea, vomiting,         

      diarrhea, and constipation, Back: Negative for injury and pain, MS/Extremity: Negative      

      for injury and deformity, Skin: Negative for injury, rash, and discoloration, Neuro:        

      Negative for headache, weakness, numbness, tingling, and seizure, Psych: Negative for       

      depression, anxiety, suicide ideation, homicidal ideation, and hallucinations.              

12:51 Respiratory: Positive for shortness of breath, Negative for cough.                          

                                                                                                  

Exam:                                                                                             

12:51 Constitutional:  This is a well developed, well nourished patient who is awake, alert,  rt  

      and in no acute distress. Head/Face:  Normocephalic, atraumatic. Eyes:  Pupils equal        

      round and reactive to light, extra-ocular motions intact.  Lids and lashes normal.          

      Conjunctiva and sclera are non-icteric and not injected.  Cornea within normal limits.      

      Periorbital areas with no swelling, redness, or edema. ENT:  Nares patent. No nasal         

      discharge, no septal abnormalities noted.  Tympanic membranes are normal and external       

      auditory canals are clear.  Oropharynx with no redness, swelling, or masses, exudates,      

      or evidence of obstruction, uvula midline.  Mucous membranes moist. Neck:  Trachea          

      midline, no thyromegaly or masses palpated, and no cervical lymphadenopathy.  Supple,       

      full range of motion without nuchal rigidity, or vertebral point tenderness.  No            

      Meningismus. Chest/axilla:  Normal chest wall appearance and motion.  Nontender with no     

      deformity.  No lesions are appreciated. Respiratory:  Lungs have equal breath sounds        

      bilaterally, clear to auscultation and percussion.  No rales, rhonchi or wheezes noted.     

       No increased work of breathing, no retractions or nasal flaring. Abdomen/GI:  Soft,        

      non-tender, with normal bowel sounds.  No distension or tympany.  No guarding or            

      rebound.  No evidence of tenderness throughout. Skin:  Warm, dry with normal turgor.        

      Normal color with no rashes, no lesions, and no evidence of cellulitis. MS/ Extremity:      

      Pulses equal, no cyanosis.  Neurovascular intact.  Full, normal range of motion. Neuro:     

       Awake and alert, GCS 15, oriented to person, place, time, and situation.  Cranial          

      nerves II-XII grossly intact.  Motor strength 5/5 in all extremities.  Sensory grossly      

      intact.  Cerebellar exam normal.  Normal gait. Psych:  Awake, alert, with orientation       

      to person, place and time.  Behavior, mood, and affect are within normal limits.            

12:51 Cardiovascular: Tachycardic, irregularly irregular rhythm, heart sounds normal.             

12:51 ECG was reviewed by the Attending Physician.                                                

                                                                                                  

Vital Signs:                                                                                      

11:05  / 99; Pulse 147; Resp 32; Pulse Ox 100% 2 lpm ;                                  mb9 

11:12  / 99; Pulse 135; Resp 22; Temp 98.3(O); Pulse Ox 99% on 2 lpm NC; Weight 104.33  ph  

      kg; Height 5 ft. 7 in. (170.18 cm);                                                         

11:30  / 63; Pulse 126; Resp 33; Pulse Ox 100% on 2 lpm NC;                             mb9 

12:00  / 83; Pulse 130; Resp 28; Pulse Ox 100% ;                                        mb9 

13:00  / 73; Pulse 127; Resp 20; Pulse Ox 100% on R/A;                                  mb9 

14:00  / 60; Pulse 109; Resp 21; Pulse Ox 100% on 2 lpm NC;                             mb9 

15:03  / 73; Pulse 117; Resp 24; Pulse Ox 98% on 2 lpm NC;                              mb9 

11:12 Body Mass Index 36.02 (104.33 kg, 170.18 cm)                                            ph  

                                                                                                  

MDM:                                                                                              

11:00 Patient medically screened.                                                             rt  

13:47 Differential diagnosis: Anemia asthma, Bronchitis A. fib, pulmonary edema. Data         rt  

      reviewed: vital signs, nurses notes, old medical records, lab test result(s), EKG,          

      radiologic studies.                                                                         

                                                                                                  

                                                                                             

11:10 Order name: CBC with Diff; Complete Time: 13:18                                         rt  

                                                                                             

11:10 Order name: CMP; Complete Time: 13:18                                                   rt  

                                                                                             

11:10 Order name: Magnesium; Complete Time: 13:18                                             rt  

                                                                                             

11:10 Order name: Troponin High Sensitivity; Complete Time: 13:18                             rt  

                                                                                             

11:10 Order name: BNP; Complete Time: 13:18                                                   rt  

                                                                                             

11:10 Order name: TSH; Complete Time: 13:18                                                   rt  

                                                                                             

11:10 Order name: EKG; Complete Time: 11:11                                                   rt  

                                                                                             

11:10 Order name: EKG - Nurse/Tech; Complete Time: 11:15                                      rt  

                                                                                             

13:53 Order name: SARS RAPID; Complete Time: 14:36                                            mb9 

                                                                                                  

EC:51 Rate is 134 beats/min. Rhythm is irregularly irregular, A fib with No ectopy. Right     rt  

      axis deviation noted. QRS interval is normal. QT interval is normal. No Q waves.            

                                                                                                  

Administered Medications:                                                                         

11:30 Drug: amiodarone 150 mg Volume: 100 ml; Route: IVPB; Infused Over: 10 mins; Site: right mb9 

      antecubital;                                                                                

11:40 Follow up: Response: No adverse reaction; IV Status: Completed infusion                 mb9 

12:01 Drug: amiodarone 900 mg, D5W 500 ml Route: IVPB; Rate: 1 mg/min; Site: right            mb9 

      antecubital;                                                                                

12:02 Drug: Zofran (Ondansetron) 4 mg Route: IVP; Site: right antecubital;                    mb9 

12:20 Follow up: Response: No adverse reaction                                                mb9 

                                                                                                  

                                                                                                  

Disposition:                                                                                      

13:47 Critical Care:.                                                                         rt  

                                                                                                  

Disposition Summary:                                                                              

22 13:44                                                                                    

Hospitalization Ordered                                                                           

      Hospitalization Status: Observation                                                     rt  

      Provider: Anthony Elkins                                                                 rt  

      Condition: Stable                                                                       rt  

      Problem: an acute exacerbation                                                          rt  

      Symptoms: have improved                                                                 rt  

      Bed/Room Type: Standard                                                                 rt  

      Location: Telemetry/MedSurg (Inpatient)(22 07:46)                                 kj1 

      Room Assignment: 408(22 07:46)                                                    kj1 

      Diagnosis                                                                                   

        - Unspecified atrial fibrillation                                                     rt  

      Forms:                                                                                      

        - Medication Reconciliation Form                                                      rt  

        - SBAR form                                                                           rt  

Critical care time excluding procedures:                                                          

13:47 Critical care time: Bedside Care: 30 minutes, Consultation: 5 minutes. Total time: 35   rt  

      minutes                                                                                     

                                                                                                  

Signatures:                                                                                       

Dispatcher [employer]                           Niyah Fowler RN RN ph Garcia, Cindy, RN RN cg Jackson, Kandis                              kj1                                                  

Eden Najera RN                 RN   mb9                                                  

Skyler Fabian MD MD   rt                                                   

                                                                                                  

Corrections: (The following items were deleted from the chart)                                    

19:37 13:44 Telemetry/MedSurg (observation) rt                                                cg  

19:37 13:44 rt                                                                                cg  

                                                                                             

07:46  19:37 [facility] ER HOLD cg                                                             kj1 

                                                                                             

07:46  19:37 ERHOLD- cg                                                                  kj1 

                                                                                                  

**************************************************************************************************

## 2022-12-28 NOTE — P.HP
Certification for Inpatient


Patient admitted to: Inpatient


With expected LOS: >2 Midnights


Patient will require the following post-hospital care: None


Practitioner: I am a practitioner with admitting privileges, knowledge of 

patient current condition, hospital course, and medical plan of care.


Services: Services provided to patient in accordance with Admission requirements

found in Title 42 Section 412.3 of the Code of Federal Regulations





Patient History


Date of Service: 12/28/22


Primary Care Provider: Brittni


Reason for admission: atrial fib w/ rvr


History of Present Illness: 





Patient is here after going to Dr. Barajas office.  The patient was just having 

some tiredness.  However she was found to have a rate of 150's in Dr. Barajas's 

office and was sent to the hospital to start her on amiodarone.  She states that

her metoprolol was stopped when she was transfered for rvr on the last visit.  

This may be a reason for the rate. 


Allergies





cefepime Allergy (Verified 01/05/22 06:30)


   Hives


codeine Allergy (Verified 07/30/22 05:03)


   Itching


levofloxacin Allergy (Verified 01/05/22 06:30)


   Hives/Rash


morphine Allergy (Verified 07/30/22 05:03)


   Itching


Penicillins Allergy (Verified 01/05/22 06:30)


   Hives/Rash


sulfamethoxazole [From Bactrim] Allergy (Verified 07/30/22 05:03)


   Hives


trimethoprim [From Bactrim] Allergy (Verified 07/30/22 05:03)


   Hives





Home Medications: 








Apixaban [Eliquis] 2.5 mg PO BID 07/29/22 


Bumetanide 1 tab PO DAILY 07/29/22 


Digoxin [Lanoxin] 1 tab PO T,TH,S 07/29/22 


Metoprolol Tartrate 1 tab PO BID 07/29/22 


Midodrine HCl 1 tab PO TID 07/29/22 


Pantoprazole [Protonix Tab*] 1 tab PO BID 07/29/22 


Sevelamer Carbonate [Renvela] 800 mg PO TID 07/29/22 


Dicyclomine [Bentyl*] 1 tab PO TID PRN 11/11/22 


Ondansetron [Zofran (Odt)*] 8 mg PO TID PRN 11/11/22 


Tizanidine [Zanaflex*] 1 tab PO BID 11/11/22 








- Past Medical/Surgical History


Diabetic: No


-: Chronic hypotension


-: Hyperlipidemia


-: Chronic anticoagulation use


-: History of nephrolithiasis requiring stent placement


-: Recurrent UTI


-: End-stage renal disease


-: CHF


-: HTN


-: A-Fib


-: tubal ligation


-: dialysis port


-: cholecystectomy


-: right broken wrist


-: lasik


-: cataracts removed





- Family History


  ** Sister


-: Cancer


Notes: per pt, sister has lung cancer and is being treated for a "spot on her 

brain"





- Social History


Alcohol use: No


CD- Drugs: No


Caffeine use: Yes





Review of Systems


10-point ROS is otherwise unremarkable


General: Malaise





Physical Examination





- Physical Exam


General: Alert, In no apparent distress


HEENT: Atraumatic, PERRLA, Mucous membr. moist/pink, EOMI, Sclerae nonicteric


Neck: Supple, 2+ carotid pulse no bruit, No LAD, Without JVD or thyroid 

abnormality


Respiratory: Clear to auscultation bilaterally, Normal air movement


Cardiovascular: Normal S1 S2, Irregular heart rate/rhythm


Gastrointestinal: Normal bowel sounds, No tenderness


Musculoskeletal: No tenderness


Integumentary: No rashes


Neurological: Normal gait, Normal speech, Normal strength at 5/5 x4 extr, Normal

 tone, Normal affect


Lymphatics: No axilla or inguinal lymphadenopathy





- Studies


Laboratory Data (last 24 hrs)





12/28/22 12:21: Sodium 137, Potassium 4.6, BUN 41 H, Creatinine 6.76 H*, Glucose

 125 H, Magnesium 2.2, Total Bilirubin 1.2 H, AST 7 L, ALT 13, Alkaline 

Phosphatase 155 H


12/28/22 12:21: WBC 10.10, Hgb 9.3 L, Hct 29.1 L, Plt Count 176








Assessment and Plan





- Problems (Diagnosis)


(1) Atrial fibrillation


Current Visit: Yes   Status: Acute   


Plan: 


she is on amiodarone.  Will restart the metoprol and digoxin.  Consult to Dr. Barajas 


Qualifiers: 


   Atrial fibrillation type: paroxysmal   Qualified Code(s): I48.0 - Paroxysmal 

atrial fibrillation   





(2) CHF (congestive heart failure)


Current Visit: No   Status: Chronic   


Plan: 


currently not fluid overloaded.  Volume status is well controlled with dialysis


Qualifiers: 


   Heart failure type: diastolic   Heart failure chronicity: chronic   Qualified

 Code(s): I50.32 - Chronic diastolic (congestive) heart failure   





(3) ESRD (end stage renal disease) on dialysis


Current Visit: No   Status: Chronic   


Plan: 


consult to Dr. Medrano for dialysis managment





Discharge Plan: Home


Plan to discharge in: 24 Hours





- Advance Directives


Does patient have a Living Will: No


Does patient have a Durable POA for Healthcare: No





- Code Status/Comfort Care


Code Status Assessed: No


Code Status: Full Code


Physician Review: Patient Assessed, Agree with Above Assessment and Plan


Critical Care: No


Time Spent Managing Pts Care (In Minutes): 45

## 2022-12-28 NOTE — ER
Nurse's Notes                                                                                     

 [facility]                                                                 

Name: Juli Rushing                                                                             

Age: 67 yrs                                                                                       

Sex: Female                                                                                       

: 1955                                                                                   

MRN: [id number]                                                                                   

Arrival Date: 2022                                                                          

Time: 10:55                                                                                       

Account#: [id number]                                                                            

Bed 19                                                                                            

Private MD:                                                                                       

Diagnosis: Unspecified atrial fibrillation                                                        

                                                                                                  

Presentation:                                                                                     

                                                                                             

11:12 Chief complaint: Patient states: Sent from cardiologist for a-fib/RVR w/ rate up to     ph  

      160s, hx of a-fib, states that she was recently transferred to [place] for HR in 30s        

      and taken off of her metoprolol. Pt denies chest pain or palpitations, reports weakness     

      and fatigue, on home oxygen at 2L NC. Coronavirus screen: Vaccine status: Patient           

      reports being unvaccinated. Ebola Screen: No symptoms or risks identified at this time.     

      Initial Sepsis Screen: Does the patient meet any 2 criteria? No. Patient's initial          

      sepsis screen is negative. Does the patient have a suspected source of infection? No.       

      Patient's initial sepsis screen is negative. Risk Assessment: Do you want to hurt           

      yourself or someone else? Patient reports no desire to harm self or others. Onset of        

      symptoms was 2022.                                                             

11:12 Method Of Arrival: Wheelchair                                                           ph  

11:12 Acuity: MIRTHA 2                                                                           ph  

                                                                                                  

Historical:                                                                                       

- Allergies:                                                                                      

11:15 Bactrim;                                                                                ph  

11:15 cefepime;                                                                               ph  

11:15 Codeine;                                                                                ph  

11:15 Levofloxacin;                                                                           ph  

11:15 Morphine; itching;                                                                      ph  

11:15 PENICILLINS;                                                                            ph  

11:15 QUINOLONES;                                                                             ph  

- PMHx:                                                                                           

11:15 Atrial fibrillation; Congestive heart failure; Dialysis; High Cholesterol; Hypertensive ph  

      disorder; stage 4 kidney failure;                                                           

- PSHx:                                                                                           

11:15 back surgery; Dialysis catheter LEFT upper chest;                                       ph  

                                                                                                  

- Immunization history:: Adult Immunizations unknown.                                             

- Social history:: Smoking status: Patient denies any tobacco usage or history of.                

- Family history:: not pertinent.                                                                 

                                                                                                  

                                                                                                  

Screenin:00 [facility] ED Fall Risk Assessment (Adult) History of falling in the last 3 months,       mb9 

      including since admission No falls in past 3 months (0 pts) Confusion or Disorientation     

      No (0 pts) Intoxicated or Sedated No (0 pts) Impaired Gait No (0 pts) Mobility Assist       

      Device Used No (0 pt) Altered Elimination No (0 pt) Score/Fall Risk Level 0 - 2 = Low       

      Risk Maintained a safe environment. Abuse screen: Denies threats or abuse. Nutritional      

      screening: No deficits noted. Nutritional screening: No deficits noted. Tuberculosis        

      screening: No symptoms or risk factors identified.                                          

                                                                                                  

Assessment:                                                                                       

11:05 General: Appears in no apparent distress. uncomfortable, Behavior is cooperative. Pain: mb9 

      Denies pain. Neuro: Drummond Agitation-Sedation Scale (RASS): 0 - Alert and Calm Level      

      of Consciousness is awake, alert, obeys commands, Oriented to person, place, time,          

      situation, Appropriate for age. Cardiovascular: Reports nausea, shortness of breath,        

      Denies chest pain, Heart tones S1 S2 present Capillary refill < 3 seconds Rhythm is         

      atrial fibrillation with rapid ventricular response.                                        

11:05 Respiratory: Reports shortness of breath Airway is patent Respiratory effort is even,   mb9 

      unlabored, Respiratory pattern is regular, symmetrical, Breath sounds are clear             

      bilaterally. GI: Abdomen is round non-distended, Bowel sounds present X 4 quads. Abd is     

      soft and non tender. GI: Abd is soft and non tender X 4 quads. : No signs and/or          

      symptoms were reported regarding the genitourinary system. EENT: No signs and/or            

      symptoms were reported regarding the EENT system. Derm: Skin is intact, Skin is dry,        

      Skin is normal, Skin temperature is warm. Musculoskeletal: Range of motion: intact in       

      all extremities.                                                                            

12:05 General: Appears comfortable, Behavior is cooperative. Pain: Denies pain. Neuro: Level  mb9 

      of Consciousness is awake, alert, obeys commands, Oriented to person, place, time,          

      situation, Appropriate for age. Cardiovascular: Reports nausea, shortness of breath,        

      Denies chest pain, Rhythm is atrial fibrillation with rapid ventricular response.           

12:05 Respiratory: Airway is patent Respiratory effort is labored, Respiratory pattern is     mb9 

      tachypnea. Derm: Skin is intact, Skin is dry, Skin is normal, Skin temperature is warm.     

13:05 Reassessment: pt currently sleeping. Respirations are even and unlabored. Airway is     mb9 

      patent. Rhythm is AFIb.                                                                     

14:27 General: Appears in no apparent distress. comfortable. Pain: Denies pain.               mb9 

      Cardiovascular: Rhythm is atrial fibrillation. Respiratory: Airway is patent. Derm:         

      Skin is pink, warm \T\ dry.                                                                 

15:22 Reassessment: pt sleeping. Airway is patent. Respirations are even. Rhythm is AFib.     mb9 

      Skin is pink, warm, and intact.                                                             

                                                                                                  

Vital Signs:                                                                                      

11:05  / 99; Pulse 147; Resp 32; Pulse Ox 100% 2 lpm ;                                  mb9 

11:12  / 99; Pulse 135; Resp 22; Temp 98.3(O); Pulse Ox 99% on 2 lpm NC; Weight 104.33  ph  

      kg; Height 5 ft. 7 in. (170.18 cm);                                                         

11:30  / 63; Pulse 126; Resp 33; Pulse Ox 100% on 2 lpm NC;                             mb9 

12:00  / 83; Pulse 130; Resp 28; Pulse Ox 100% ;                                        mb9 

13:00  / 73; Pulse 127; Resp 20; Pulse Ox 100% on R/A;                                  mb9 

14:00  / 60; Pulse 109; Resp 21; Pulse Ox 100% on 2 lpm NC;                             mb9 

15:03  / 73; Pulse 117; Resp 24; Pulse Ox 98% on 2 lpm NC;                              mb9 

11:12 Body Mass Index 36.02 (104.33 kg, 170.18 cm)                                            ph  

                                                                                                  

ED Course:                                                                                        

10:55 Patient arrived in ED.                                                                  as  

10:58 Skyler Fabian MD is Attending Physician.                                            rt  

11:00 Placed in gown. Bed in low position. Call light in reach. Side rails up X 1. Client     mb9 

      placed on continuous cardiac and pulse oximetry monitoring. NIBP monitoring applied.        

      Cardiac monitor on.                                                                         

11:00 EKG done, by ED staff, reviewed by Skyler Fabian MD.                                  mb9 

11:15 Triage completed.                                                                       ph  

11:15 Eden Najera RN is Primary Nurse.                                               mb9 

11:15 Arm band placed on Patient placed in an exam room, on a stretcher, on oxygen, on        ph  

      cardiac monitor, on pulse oximetry.                                                         

11:15 Inserted saline lock: 20 gauge in right antecubital area, using aseptic technique.      mb9 

12:28 No provider procedures requiring assistance completed.                                  mb9 

13:43 Anthony Elkins MD is Hospitalizing Provider.                                            rt  

14:12 SARS RAPID Sent.                                                                        mb9 

19:40 Patient admitted, IV remains in place.                                                  ha1 

                                                                                                  

Administered Medications:                                                                         

11:30 Drug: amiodarone 150 mg Volume: 100 ml; Route: IVPB; Infused Over: 10 mins; Site: right mb9 

      antecubital;                                                                                

11:40 Follow up: Response: No adverse reaction; IV Status: Completed infusion                 mb9 

12:01 Drug: amiodarone 900 mg, D5W 500 ml Route: IVPB; Rate: 1 mg/min; Site: right            mb9 

      antecubital;                                                                                

12:02 Drug: Zofran (Ondansetron) 4 mg Route: IVP; Site: right antecubital;                    mb9 

12:20 Follow up: Response: No adverse reaction                                                mb9 

                                                                                                  

                                                                                                  

Medication:                                                                                       

12:28 VIS not applicable for this client.                                                     mb9 

                                                                                                  

Outcome:                                                                                          

13:44 Decision to Hospitalize by Provider.                                                    rt  

19:40 Admitted to ER Hold.  Please see [facility] for further documentation.                    ha1 

19:40 Condition: stable                                                                           

19:40 Instructed on the need for admit, Demonstrated understanding of instructions.               

                                                                                             

09:49 Patient left the ED.                                                                      

                                                                                                  

Signatures:                                                                                       

Lynnette Hutchinson Shelby, RN RN                                                      

Niyah Mcguire RN RN                                                      

Lori Giles RN                        RN   ha1                                                  

Eden Najera RN                 RN   mb9                                                  

Skyler Fabian MD MD   rt                                                   

                                                                                                  

Corrections: (The following items were deleted from the chart)                                    

                                                                                             

12:05 11:30  / 79; Pulse 88bpm; Resp 18bpm; Pulse Ox 100%; mb9                          mb9 

                                                                                                  

**************************************************************************************************

## 2022-12-29 VITALS — OXYGEN SATURATION: 96 %

## 2022-12-29 LAB — HBV SURFACE AB SER-ACNC: < 3.1 MIU/ML (ref ?–8)

## 2022-12-29 RX ADMIN — SEVELAMER CARBONATE SCH: 800 TABLET, FILM COATED ORAL at 17:00

## 2022-12-29 RX ADMIN — TIZANIDINE SCH MG: 4 TABLET ORAL at 08:47

## 2022-12-29 RX ADMIN — APIXABAN SCH MG: 2.5 TABLET, FILM COATED ORAL at 08:47

## 2022-12-29 RX ADMIN — TIZANIDINE SCH MG: 4 TABLET ORAL at 21:10

## 2022-12-29 RX ADMIN — SEVELAMER CARBONATE SCH MG: 800 TABLET, FILM COATED ORAL at 08:00

## 2022-12-29 RX ADMIN — AMIODARONE HYDROCHLORIDE SCH MG: 200 TABLET ORAL at 21:09

## 2022-12-29 RX ADMIN — PANTOPRAZOLE SODIUM SCH MG: 40 TABLET, DELAYED RELEASE ORAL at 08:47

## 2022-12-29 RX ADMIN — APIXABAN SCH MG: 2.5 TABLET, FILM COATED ORAL at 21:09

## 2022-12-29 RX ADMIN — PANTOPRAZOLE SODIUM SCH MG: 40 TABLET, DELAYED RELEASE ORAL at 21:09

## 2022-12-29 RX ADMIN — METOPROLOL TARTRATE SCH MG: 25 TABLET ORAL at 08:47

## 2022-12-29 RX ADMIN — SEVELAMER CARBONATE SCH MG: 800 TABLET, FILM COATED ORAL at 13:48

## 2022-12-29 RX ADMIN — METOPROLOL TARTRATE SCH: 25 TABLET ORAL at 17:18

## 2022-12-29 NOTE — CON
Date of Consultation:  12/29/2022



Reason For Consultation:  Atrial fibrillation with rapid ventricular response.



History Of Present Illness:  This is a middle-aged female, well known to me.  Has history of chronic 
atrial fibrillation, hypertension, diastolic heart failure.  No history of coronary artery disease an
d she has end-stage renal disease, on hemodialysis.  Evaluated yesterday in my office and she was in 
atrial fibrillation, heart rate was in the 150s, appeared to be fluid overloaded with shortness of br
eath and generally is very weak, so she was directly admitted to the hospital.  Started on amiodarone
 drip.  Heart rate has improved significantly and the patient, however, still short of breath.  No ch
est pain.



Past Medical History:  As outlined above in HPI.



Medications:  Refer to reconciliation sheet for detailed list.



Allergies:  LONG LIST OF ALLERGIES WAS REVIEWED.  PLEASE REFER TO THE NURSE'S NOTES IN THE CHART.



Family History:  No premature coronary artery disease or cancer.



Social History:  She does not smoke or drink.  Does not use any drugs.



Review of Systems:

All systems reviewed and they were negative except what mentioned in HPI.



Physical Examination:

Vital Signs:  Temperature is 98.3, pulse 74, breathing at 22, blood pressure is 115/65, saturating 88
% on room air, and 97% with oxygen. 

General:  Pleasant middle-aged female, appears generally weak and has some slight shortness of breath
. 

Head and Neck:  Pupils are equal, reactive to light.  Intact eye movements.  Positive JVD.  No cervic
al lymphadenopathy.  Neck is supple.  Thyroid is not enlarged. 

Lungs:  Decreased breathing sounds with thin crackles in the bases.  No accessory muscle use or muscl
e retraction. 

Heart:  Irregularly irregular.  No extra sounds. 

Abdomen:  Soft, nontender.  Bowel sounds positive.  No organomegaly.  No masses or hernia.  No rigidi
ty or rebound. 

Extremities:  No clubbing or cyanosis.  2+ pedal edema. 

Neurologic:  Alert, awake, oriented x3.  No acute focal deficits appreciated.



Investigations:  Troponin is 23.  NT-proBNP is 39,000 range.  BUN 41, creatinine 6.7, hemoglobin 9.3.




Assessment And Recommendations:  

1.Atrial fibrillation with rapid ventricular response, status post 24 hours IV amiodarone load.  Thi
s was discontinued today, started her on amiodarone 200 mg by mouth twice a day.  Avoid beta-blockers
 as her blood pressure was significantly low, which led to discontinuation of her beta-blocker and al
so recommend to stop digoxin due to the advanced kidney failure as digoxin toxicity is an extreme pos
sibility due to the advanced kidney disease.  After being on amiodarone for about a month, we will pl
an for cardioversion.  Also continue Eliquis 2.5 mg twice a day.

2.Acute on chronic diastolic heart failure.  She is fluid overloaded.  Consult Nephrology for dialys
is.

3.End-stage renal disease.  The patient needs dialysis today.  She is significantly fluid overloaded
.  We will discuss with Nephrology.





/MODL

DD:  12/29/2022 14:49:37Voice ID:  [id number]

DT:  12/29/2022 15:45:03Report ID:  [id number]

## 2022-12-29 NOTE — CON
Date of Consultation:  12/29/2022



Chief Complaint:  End-stage renal disease on hemodialysis, congestive heart failure, generalized weak
ness, atrial fibrillation with rapid ventricular response, and shortness of breath.



History Of Present Illness:  The patient was admitted to the hospital after she was seen by her cardi
ologist for office followup and she was found to have tachycardia, heart rate was in 150s.  The patie
nt was complaining of some shortness of breath.  The patient was referred to the hospital for treatme
nt of atrial fibrillation with rapid ventricular response.  Recently the patient was taken off metopr
olol because of bradycardia and she was hospitalized for bradycardia and transferred to [facility] for higher level of care in the recent past.  The patient today is treated with amiodarone and beta
 blocker was initiated for heart rate control.  The patient is complaining of some shortness of breat
h, she is bedbound.  She denies chest pain, although she is complaining of generalized weakness.  She
 has chronic legs edema and was treated with Bumex as well as she is treated with dialysis 3 times pe
r week for end-stage renal disease and during dialysis, ultrafiltration is done for volume control.  
The patient has intradialytic hypotension, chronic hypotension.  She was complaining of some dizzines
s and generalized weakness.  She denies syncope or fall.



Past Medical History:  End-stage renal disease, chronic hypotension, hyperlipidemia, chronic anticoag
ulation use, atrial fibrillation, chronic recurrent UTI, history of nephrolithiasis requiring stent p
lacement and stent removal, congestive heart failure with diastolic dysfunction, atrial fibrillation 
chronic, dialysis access, tubal ligation, cholecystectomy, history of acute pancreatitis, and history
 of pneumonia.



Family History:  Sister had lung cancer and brain metastatic disease.



Social History:  She denies tobacco, alcohol, or illicit drugs.



Physical Examination:

General:  Patient is awake, alert, follows commands. 

Eyes:  Anicteric sclerae.  EOMI. 

Ears, Nose, Mouth, And Throat:  Oral mucosa moist.  No pallor. 

Neck:  Supple.  No bruits. 

Lungs:  Clear to auscultation.  No wheezing. 

Cardiovascular:  S1, S2.  Irregularly irregular. 

Abdomen:  Soft, benign, obese, nontender.  No rebound.  No guarding.  No flank tenderness. 

Extremities:  Slight edema.  Moving extremities.  

Neuro:  Cranial nerves intact. 

Psychiatric:  Alert and oriented x3.  Normal affect.



Investigations:  Sodium 137, potassium 4.6, BUN 41, creatinine 6.77, glucose 125, and magnesium 2.2. 
 Hemoglobin 9.3, WBC 10.10, and platelet count 176,000.



Impression And Plan:  

1.End-stage renal disease, fluid overload, congestive heart failure with diastolic dysfunction, and 
volume overload.  Patient will have urgent dialysis to control the volume overload and to provide cali
lysis with ultrafiltration.  Patient has chronic hypotension and she will resume midodrine for blood 
pressure support to prevent intradialytic hypotension.

2.Congestive heart failure.  Patient is undergoing cardiac workup to rule out acute coronary syndrom
e.

3.Atrial fibrillation with rapid ventricular response.  The patient was restarted on metoprolol and 
digoxin and she is currently on amiodarone.

4.Renal osteodystrophy.  Continue renal diet and binders.

5.Anemia of chronic kidney disease.  Monitor hemoglobin level.  Continue LENARD.





EB/MODL

DD:  12/29/2022 20:16:22Voice ID:  [id number]

DT:  12/29/2022 21:38:09Report ID:  [id number]

## 2022-12-29 NOTE — EKG
Test Date:    2022-12-24               Test Time:    07:58:35

Technician:   SHELTON                                    

                                                     

MEASUREMENT RESULTS:                                       

Intervals:                                           

Rate:         122                                    

ND:                                                  

QRSD:         82                                     

QT:           280                                    

QTc:          399                                    

Axis:                                                

P:                                                   

ND:                                                  

QRS:          74                                     

T:            256                                    

                                                     

INTERPRETIVE STATEMENTS:                                       

                                                     

Atrial fibrillation with rapid ventricular response

Nonspecific ST and T wave abnormality

Abnormal ECG

Compared to ECG 12/03/2022 09:38:51

ST (T wave) deviation now present

Incomplete right bundle-branch block no longer present

Right ventricular hypertrophy no longer present

Early repolarization no longer present

T-wave abnormality no longer present

Possible ischemia no longer present



Electronically Signed On 12-29-22 17:49:14 CST by Venancio Barajas

## 2022-12-29 NOTE — EKG
Test Date:    2022-12-28               Test Time:    11:12:05

Technician:   MB                                     

                                                     

MEASUREMENT RESULTS:                                       

Intervals:                                           

Rate:         134                                    

WI:                                                  

QRSD:         76                                     

QT:           264                                    

QTc:          394                                    

Axis:                                                

P:                                                   

WI:                                                  

QRS:          105                                    

T:            -53                                    

                                                     

INTERPRETIVE STATEMENTS:                                       

                                                     

Atrial fibrillation with rapid ventricular response

Possible Right ventricular hypertrophy

Nonspecific ST and T wave abnormality

Abnormal ECG

Compared to ECG 12/24/2022 20:08:52

Ventricular premature complex(es) no longer present

ST (T wave) deviation still present



Electronically Signed On 12-29-22 17:44:21 CST by Venancio Barajas

## 2022-12-29 NOTE — EKG
Test Date:    2022-12-24               Test Time:    20:08:52

Technician:   EDDIE                                   

                                                     

MEASUREMENT RESULTS:                                       

Intervals:                                           

Rate:         151                                    

CO:                                                  

QRSD:         76                                     

QT:           320                                    

QTc:          507                                    

Axis:                                                

P:                                                   

CO:                                                  

QRS:          127                                    

T:            15                                     

                                                     

INTERPRETIVE STATEMENTS:                                       

                                                     

Atrial fibrillation with rapid ventricular response 

Possible Right ventricular hypertrophy

Marked ST abnormality, possible inferior subendocardial injury

Abnormal ECG

Compared to ECG 12/24/2022 07:58:35

Ventricular premature complex(es) now present

ST (T wave) deviation still present



Electronically Signed On 12-29-22 17:48:33 CST by Venancio Barajas

## 2022-12-29 NOTE — P.PN
Subjective


Date of Service: 12/29/22


Primary Care Provider: Brittni


Chief Complaint: atrial fib w/ rvr


Subjective: Improving (heart rate well controlled on metoprolol and amiodarone)





Review of Systems


10-point ROS is otherwise unremarkable


General: Weakness





Physical Examination





- Vital Signs


Temperature: 97.8 F


Blood Pressure: 109/79


Pulse: 79


Respirations: 22


Pulse Ox (%): 97





- Physical Exam


General: Alert, In no apparent distress


HEENT: Atraumatic, PERRLA, EOMI


Neck: Supple, JVD not distended


Respiratory: Clear to auscultation bilaterally, Normal air movement


Cardiovascular: Regular rate/rhythm, Normal S1 S2


Gastrointestinal: Normal bowel sounds, No tenderness


Musculoskeletal: No tenderness


Integumentary: No rashes


Neurological: Normal speech, Normal tone, Normal affect


Lymphatics: No axilla or inguinal lymphadenopathy





- Studies


Laboratory Data (last 24 hrs)





12/28/22 12:21: Sodium 137, Potassium 4.6, BUN 41 H, Creatinine 6.76 H*, Glucose

125 H, Magnesium 2.2, Total Bilirubin 1.2 H, AST 7 L, ALT 13, Alkaline 

Phosphatase 155 H


12/28/22 12:21: WBC 10.10, Hgb 9.3 L, Hct 29.1 L, Plt Count 176








Assessment And Plan





- Current Problems (Diagnosis)


(1) Atrial fibrillation


Current Visit: Yes   Status: Acute   


Plan: 


she is on amiodarone.  Will restart the metoprol and digoxin.  Consult to Dr. Barajas 


12.29


well controlled.  Will d/c amiodarone drip and start her on oral amiodarone.  


Qualifiers: 


   Atrial fibrillation type: paroxysmal   Qualified Code(s): I48.0 - Paroxysmal 

atrial fibrillation   





(2) CHF (congestive heart failure)


Current Visit: No   Status: Chronic   


Plan: 


currently not fluid overloaded.  Volume status is well controlled with dialysis


Qualifiers: 


   Heart failure type: diastolic   Heart failure chronicity: chronic   Qualified

Code(s): I50.32 - Chronic diastolic (congestive) heart failure   





(3) ESRD (end stage renal disease) on dialysis


Current Visit: No   Status: Chronic   


Plan: 


consult to Dr. Medrano for dialysis managment





Discharge Plan: Home


Plan to discharge in: 24 Hours


Physician Review: Patient Assessed, Agree with Above Assessment and Plan


Critical Care: No


Time Spent Managing PTS Care (In Minutes): 20

## 2022-12-30 VITALS — DIASTOLIC BLOOD PRESSURE: 74 MMHG | TEMPERATURE: 97 F | SYSTOLIC BLOOD PRESSURE: 122 MMHG

## 2022-12-30 LAB
ALBUMIN SERPL BCP-MCNC: 2.9 G/DL (ref 3.4–5)
ALP SERPL-CCNC: 152 U/L (ref 45–117)
ALT SERPL W P-5'-P-CCNC: 11 U/L (ref 13–56)
AST SERPL W P-5'-P-CCNC: 5 U/L (ref 15–37)
BUN BLD-MCNC: 44 MG/DL (ref 7–18)
GLUCOSE SERPLBLD-MCNC: 98 MG/DL (ref 74–106)
HCT VFR BLD CALC: 26.3 % (ref 36–45)
LYMPHOCYTES # SPEC AUTO: 1.1 K/UL (ref 0.7–4.9)
MCV RBC: 97 FL (ref 80–100)
PMV BLD: 7.9 FL (ref 7.6–11.3)
POTASSIUM SERPL-SCNC: 4.4 MMOL/L (ref 3.5–5.1)
RBC # BLD: 2.71 M/UL (ref 3.86–4.86)

## 2022-12-30 RX ADMIN — METOPROLOL TARTRATE SCH MG: 25 TABLET ORAL at 17:41

## 2022-12-30 RX ADMIN — AMIODARONE HYDROCHLORIDE SCH MG: 200 TABLET ORAL at 08:56

## 2022-12-30 RX ADMIN — APIXABAN SCH MG: 2.5 TABLET, FILM COATED ORAL at 08:56

## 2022-12-30 RX ADMIN — PANTOPRAZOLE SODIUM SCH MG: 40 TABLET, DELAYED RELEASE ORAL at 08:56

## 2022-12-30 RX ADMIN — AMIODARONE HYDROCHLORIDE SCH: 200 TABLET ORAL at 21:00

## 2022-12-30 RX ADMIN — SEVELAMER CARBONATE SCH MG: 800 TABLET, FILM COATED ORAL at 17:41

## 2022-12-30 RX ADMIN — SEVELAMER CARBONATE SCH MG: 800 TABLET, FILM COATED ORAL at 08:56

## 2022-12-30 RX ADMIN — METOPROLOL TARTRATE SCH: 25 TABLET ORAL at 05:12

## 2022-12-30 RX ADMIN — APIXABAN SCH: 2.5 TABLET, FILM COATED ORAL at 21:00

## 2022-12-30 RX ADMIN — TIZANIDINE SCH MG: 4 TABLET ORAL at 08:57

## 2022-12-30 RX ADMIN — PANTOPRAZOLE SODIUM SCH: 40 TABLET, DELAYED RELEASE ORAL at 21:00

## 2022-12-30 RX ADMIN — TIZANIDINE SCH: 4 TABLET ORAL at 21:00

## 2022-12-30 RX ADMIN — SEVELAMER CARBONATE SCH MG: 800 TABLET, FILM COATED ORAL at 15:50

## 2022-12-30 NOTE — PN
Date of Progress Note:  12/30/2022



Subjective:  Seen at bedside.  She appears to be doing better.  Heart rate has improved, but she is s
till having shortness of breath.



Review of Systems:

No chest pain.  Has shortness of breath and generally is weak.  No nausea, vomiting, diarrhea.  No ab
dominal pain.  No dysuria, polyuria, or urinary urgency.  All other systems reviewed are negative.



Physical Examination:

Vital Signs:  Temperature is 98.6, pulse is 97, breathing at 18, blood pressure 116/65, saturating 95
%. 

General:  Pleasant middle-aged female, in no apparent distress. 

Head and Neck:  Pupils are equal, reactive to light.  Intact eye movements.  No JVD.  No cervical lym
phadenopathy. 

Neck:  Supple.  Thyroid is not enlarged. 

Lungs:  Decreased breathing sounds with crackles in both lung fields.  No accessory muscle use or mus
rajat retraction. 

Heart:  Irregular.  No extra sounds. 

Abdomen:  Soft, nontender.  Bowel sounds positive.  No organomegaly.  No masses or hernia.  No rigidi
ty or rebound. 

Extremities:  No clubbing, cyanosis.  Positive edema. 

Neurologic:  Alert, awake.  No acute focal deficits appreciated.



Investigations:  Labs were reviewed.



Assessment/recommendations:  

1.Atrial fibrillation with rapid ventricular response.  Heart rate has improved.  Continue amiodaron
e at 200 mg twice a day and metoprolol 12.5 mg twice a day.

2.Shortness of breath due to fluid retention.  I do not believe the patient is ready to go home and 
recommend further evaluation by 

Nephrology to do back-to-back sessions of dialysis to get her to more for dry weight as she is defini
tely still fluid overloaded.





SR/MODL

DD:  12/30/2022 14:25:13Voice ID:  [id number]

DT:  12/30/2022 17:33:57Report ID:  [id number]

## 2022-12-30 NOTE — RAD REPORT
[facility]  [facility]

 

CLINICAL HISTORY:  Shortness of breath

 

COMPARISON:  December 24, 2022

 

FINDINGS:   Mild bilateral pulmonary opacities have resolved

 

Heart is mildly enlarged.

 

Elevation right hemidiaphragm persists

 

Central venous catheter in place

## 2022-12-30 NOTE — P.DS
Admission Date: 12/29/22


Discharge Date: 12/30/22


Primary Care Provider: Brittni


Disposition: ROUTINE DISCHARGE


Discharge Condition: GOOD


Reason for Admission: atrial fib w/ rvr





- Problems


(1) Atrial fibrillation


Current Visit: Yes   Status: Acute   


Qualifiers: 


   Atrial fibrillation type: paroxysmal   Qualified Code(s): I48.0 - Paroxysmal 

atrial fibrillation   





(2) CHF (congestive heart failure)


Current Visit: No   Status: Chronic   


Qualifiers: 


   Heart failure type: diastolic   Heart failure chronicity: chronic   Qualified

Code(s): I50.32 - Chronic diastolic (congestive) heart failure   





(3) ESRD (end stage renal disease) on dialysis


Current Visit: No   Status: Chronic   


Brief History of Present Illness: 





Patient is here after going to Dr. Barajas office.  The patient was just having 

some tiredness.  However she was found to have a rate of 150's in Dr. Barajas's 

office and was sent to the hospital to start her on amiodarone.  She states that

her metoprolol was stopped when she was transfered for rvr on the last visit.  

This may be a reason for the rate. 


Hospital Course: 





Patient admitted for afib with RVR.  Was started on an amiodarone drip, digoxin 

and metoprolol.  Seen by Dr. Barajas.  she will be sent home on oral amiodarone 

and low dose metoprolol   Thank you for allowing me to take part in the patients

care. 


Vital Signs/Physical Exam: 














Temp Pulse Resp BP Pulse Ox


 


 98.6 F   97 H  18   116/65   95 


 


 12/30/22 08:00  12/30/22 08:00  12/30/22 08:00  12/30/22 08:00  12/30/22 08:00








General: Alert, In no apparent distress


HEENT: Atraumatic, PERRLA, EOMI


Neck: Supple, JVD not distended


Respiratory: Clear to auscultation bilaterally, Normal air movement


Cardiovascular: Regular rate/rhythm, Normal S1 S2


Gastrointestinal: Normal bowel sounds, No tenderness


Musculoskeletal: No tenderness


Integumentary: No rashes


Neurological: Normal speech, Normal tone, Normal affect


Lymphatics: No axilla or inguinal lymphadenopathy


Laboratory Data at Discharge: 














WBC  10.10 K/uL (4.3-10.9)   12/28/22  12:21    


 


Hgb  9.3 g/dL (12.0-15.0)  L  12/28/22  12:21    


 


Hct  29.1 % (36.0-45.0)  L  12/28/22  12:21    


 


Plt Count  176 K/uL (152-406)   12/28/22  12:21    


 


Sodium  137 mmol/L (136-145)   12/28/22  12:21    


 


Potassium  4.6 mmol/L (3.5-5.1)   12/28/22  12:21    


 


BUN  41 mg/dL (7-18)  H  12/28/22  12:21    


 


Creatinine  6.76 mg/dL (0.55-1.02)  H*  12/28/22  12:21    


 


Glucose  125 mg/dL ()  H  12/28/22  12:21    


 


Magnesium  2.2 mg/dL (1.6-2.4)   12/28/22  12:21    


 


Total Bilirubin  1.2 mg/dL (0.2-1.0)  H  12/28/22  12:21    


 


AST  7 U/L (15-37)  L  12/28/22  12:21    


 


ALT  13 U/L (13-56)   12/28/22  12:21    


 


Alkaline Phosphatase  155 U/L ()  H  12/28/22  12:21    








Home Medications: 








Apixaban [Eliquis] 2.5 mg PO BID 07/29/22 


Bumetanide 1 tab PO DAILY 07/29/22 


Digoxin [Lanoxin] 1 tab PO T,TH,S 07/29/22 


Metoprolol Tartrate 1 tab PO BID 07/29/22 


Midodrine HCl 1 tab PO TID 07/29/22 


Pantoprazole [Protonix Tab*] 1 tab PO BID 07/29/22 


Sevelamer Carbonate [Renvela] 800 mg PO TID 07/29/22 


Dicyclomine [Bentyl*] 1 tab PO TID PRN 11/11/22 


Ondansetron [Zofran (Odt)*] 8 mg PO TID PRN 11/11/22 


Tizanidine [Zanaflex*] 1 tab PO BID 11/11/22 


Amiodarone HCl [Cordarone*] 200 mg PO BID 90 Days #180 tab 12/30/22 


Metoprolol Tartrate [Lopressor*] 12.5 mg PO BID 6AM 6PM 90 Days #180 tab 

12/30/22 





New Medications: 


Amiodarone HCl [Cordarone*] 200 mg PO BID 90 Days #180 tab


Metoprolol Tartrate [Lopressor*] 12.5 mg PO BID 6AM 6PM 90 Days #180 tab


Diet: Renal


Activity: Ad susan


Followup: 


Anthony Elkins MD [ACTIVE - CAN ADMIT] - 1 Week


Venancio Barajas MD [Primary Care Provider] - 1 Week


Physician Review: Patient Assessed, Agree with Above Assessment and Plan


Time spent managing pt's care (in minutes): 30

## 2022-12-30 NOTE — PN
Date of Progress Note:  12/30/2022



Chief Complaint:  End-stage renal disease, on hemodialysis; congestive heart failure; generalized wea
kness.



Subjective:  The patient is undergoing cardiac workup.  She was started on beta-blocker and digoxin f
or rate control.  She received amiodarone for atrial fibrillation with rapid ventricular response.  T
he patient has history of congestive heart failure, intradialytic hypotension, and chronic hypotensio
n.  She received dialysis yesterday with ultrafiltration, although she is not feeling better today.  
She has some shortness of breath and is to have urgent dialysis for volume control.  Leg edema is in 
good control, although patient is having dyspnea on exertion and some orthopnea.  Cardiology consulta
tion was obtained during this admission.



Review of Systems:

The patient denies fever, chills.  Denies cough.  She complains of shortness of breath.



Physical Examination:

Lungs:  Few rhonchi.  Crackles at bases. 

Heart:  S1, S2.  

Abdomen:  Soft, obese. 

Extremities:  Slight edema in both ankles.



Impression:  

1.End-stage renal disease.  Continue daily dialysis treatment with ultrafiltration to control fluid 
overload and provide management for congestive heart failure with diastolic dysfunction.  Plan is to 
use midodrine for blood pressure support during dialysis.  The patient has chronic hypotension and in
tradialytic hypotension.

2.Congestive heart failure.  Patient is undergoing cardiac workup.

3.Atrial fibrillation with rapid ventricular rate on metoprolol and digoxin.  Further recommendation
 from Cardiology.  I recommend to check digoxin level.

4.Renal osteodystrophy.  Continue renal diet and binders.

5.Anemia of chronic kidney disease.  Continue LENARD.





EB/MODL

DD:  12/30/2022 16:24:06Voice ID:  [id number]

DT:  12/30/2022 21:46:41Report ID:  [id number]

## 2022-12-31 NOTE — P.PN
Date of Service: 12/31/22





Patient was discharged yesterday.  Held at the request of Dr. Barajas.  Wanted a 

round of dialysis due to sob.  Per the night shift nurse she was eating a lot of

fast food the Night before(high sodium food).  Was dialysied by Dr. Medrano.  

Was to be discharged Sat after her regular dialysis.  However she wanted to 

leave and have dialysis at her regular center.  Left before I responded to the 

night nurse.  Hopefully she will follow up in the office.

## 2023-01-04 ENCOUNTER — HOSPITAL ENCOUNTER (OUTPATIENT)
Dept: HOSPITAL 97 - ER | Age: 68
Setting detail: OBSERVATION
LOS: 1 days | Discharge: HOME | End: 2023-01-05
Attending: INTERNAL MEDICINE | Admitting: INTERNAL MEDICINE
Payer: COMMERCIAL

## 2023-01-04 VITALS — BODY MASS INDEX: 39.3 KG/M2

## 2023-01-04 DIAGNOSIS — R11.2: Primary | ICD-10-CM

## 2023-01-04 DIAGNOSIS — E87.1: ICD-10-CM

## 2023-01-04 DIAGNOSIS — M54.50: ICD-10-CM

## 2023-01-04 DIAGNOSIS — I10: ICD-10-CM

## 2023-01-04 DIAGNOSIS — D63.1: ICD-10-CM

## 2023-01-04 DIAGNOSIS — E78.5: ICD-10-CM

## 2023-01-04 DIAGNOSIS — Z88.0: ICD-10-CM

## 2023-01-04 DIAGNOSIS — Z20.822: ICD-10-CM

## 2023-01-04 DIAGNOSIS — Z88.6: ICD-10-CM

## 2023-01-04 DIAGNOSIS — K52.9: ICD-10-CM

## 2023-01-04 DIAGNOSIS — I48.0: ICD-10-CM

## 2023-01-04 DIAGNOSIS — N18.6: ICD-10-CM

## 2023-01-04 DIAGNOSIS — K59.00: ICD-10-CM

## 2023-01-04 DIAGNOSIS — I50.32: ICD-10-CM

## 2023-01-04 DIAGNOSIS — N20.0: ICD-10-CM

## 2023-01-04 DIAGNOSIS — Z99.2: ICD-10-CM

## 2023-01-04 LAB
ALBUMIN SERPL BCP-MCNC: 3.1 G/DL (ref 3.4–5)
ALP SERPL-CCNC: 135 U/L (ref 45–117)
ALT SERPL W P-5'-P-CCNC: < 10 U/L (ref 13–56)
AST SERPL W P-5'-P-CCNC: < 3 U/L (ref 15–37)
BLD SMEAR INTERP: (no result)
BUN BLD-MCNC: 41 MG/DL (ref 7–18)
GLUCOSE SERPLBLD-MCNC: 127 MG/DL (ref 74–106)
HCT VFR BLD CALC: 26.6 % (ref 36–45)
INR BLD: 1.98
LIPASE SERPL-CCNC: 117 U/L (ref 73–393)
LYMPHOCYTES # SPEC AUTO: 0.9 K/UL (ref 0.7–4.9)
MAGNESIUM SERPL-MCNC: 2.2 MG/DL (ref 1.6–2.4)
MCV RBC: 95.9 FL (ref 80–100)
MORPHOLOGY BLD-IMP: (no result)
NT-PROBNP SERPL-MCNC: (no result) PG/ML (ref ?–125)
PMV BLD: 7.8 FL (ref 7.6–11.3)
POTASSIUM SERPL-SCNC: 4 MMOL/L (ref 3.5–5.1)
RBC # BLD: 2.77 M/UL (ref 3.86–4.86)
SARS-COV-2 RNA RESP QL NAA+PROBE: NEGATIVE
TROPONIN I SERPL HS-MCNC: 19.9 PG/ML (ref ?–58.9)

## 2023-01-04 PROCEDURE — 80048 BASIC METABOLIC PNL TOTAL CA: CPT

## 2023-01-04 PROCEDURE — 80076 HEPATIC FUNCTION PANEL: CPT

## 2023-01-04 PROCEDURE — 0240U: CPT

## 2023-01-04 PROCEDURE — 99284 EMERGENCY DEPT VISIT MOD MDM: CPT

## 2023-01-04 PROCEDURE — 71250 CT THORAX DX C-: CPT

## 2023-01-04 PROCEDURE — 96361 HYDRATE IV INFUSION ADD-ON: CPT

## 2023-01-04 PROCEDURE — 87040 BLOOD CULTURE FOR BACTERIA: CPT

## 2023-01-04 PROCEDURE — 83605 ASSAY OF LACTIC ACID: CPT

## 2023-01-04 PROCEDURE — 85610 PROTHROMBIN TIME: CPT

## 2023-01-04 PROCEDURE — 36415 COLL VENOUS BLD VENIPUNCTURE: CPT

## 2023-01-04 PROCEDURE — 71045 X-RAY EXAM CHEST 1 VIEW: CPT

## 2023-01-04 PROCEDURE — 84484 ASSAY OF TROPONIN QUANT: CPT

## 2023-01-04 PROCEDURE — 96374 THER/PROPH/DIAG INJ IV PUSH: CPT

## 2023-01-04 PROCEDURE — 93005 ELECTROCARDIOGRAM TRACING: CPT

## 2023-01-04 PROCEDURE — 83690 ASSAY OF LIPASE: CPT

## 2023-01-04 PROCEDURE — 83880 ASSAY OF NATRIURETIC PEPTIDE: CPT

## 2023-01-04 PROCEDURE — 74176 CT ABD & PELVIS W/O CONTRAST: CPT

## 2023-01-04 PROCEDURE — 84443 ASSAY THYROID STIM HORMONE: CPT

## 2023-01-04 PROCEDURE — 83735 ASSAY OF MAGNESIUM: CPT

## 2023-01-04 PROCEDURE — 85025 COMPLETE CBC W/AUTO DIFF WBC: CPT

## 2023-01-04 RX ADMIN — METOPROLOL TARTRATE SCH MG: 25 TABLET ORAL at 20:59

## 2023-01-04 RX ADMIN — PANTOPRAZOLE SODIUM SCH MG: 40 TABLET, DELAYED RELEASE ORAL at 16:30

## 2023-01-04 RX ADMIN — TRAMADOL HYDROCHLORIDE AND ACETAMINOPHEN SCH TAB: 37.5; 325 TABLET, FILM COATED ORAL at 16:53

## 2023-01-04 RX ADMIN — SEVELAMER CARBONATE SCH MG: 800 TABLET, FILM COATED ORAL at 17:00

## 2023-01-04 RX ADMIN — Medication SCH ML: at 21:00

## 2023-01-04 RX ADMIN — APIXABAN SCH MG: 2.5 TABLET, FILM COATED ORAL at 21:00

## 2023-01-04 RX ADMIN — AMIODARONE HYDROCHLORIDE SCH MG: 200 TABLET ORAL at 21:00

## 2023-01-04 NOTE — RAD REPORT
EXAM DESCRIPTION:  CT - Chest Abd Pelvis Wo Con - 1/4/2023 1:51 pm

 

CLINICAL HISTORY:   Chest and abdominal pain

 

COMPARISON:  October 2022

 

TECHNIQUE:  Computed axial tomography of the chest, abdomen and pelvis was obtained. Oral contrast wa
s given. IV contrast was not requested.

 

All CT scans are performed using dose optimization technique as appropriate and may include automated
 exposure control or mA/KV adjustment according to patient size.

 

FINDINGS:   The evaluation of mediastinum, kandis, vessels and solid organs is limited secondary to the
 lack of IV contrast administration

 

Mild right basilar lung atelectasis.

 

Left lung is clear.

 

 No mediastinal or hilar lymphadenopathy is seen.

 

A pleural effusion is not present. A pericardial effusion is not seen.

 

14.4 centimeter cystic mass right abdomen minimally enlarged.

 

Cholecystectomy with chronic pneumobilia.

 

Liver, spleen, pancreas and adrenals appear grossly normal.

 

Left renal calculi. 12 millimeter calculus left UPJ with mild to moderate left hydronephrosis. Renal 
cortical thinning. This is without significant change from prior exam.

 

There is no evidence of diverticulitis.

 

Multiple old and subacute vertebral compression fracture is unchanged.

 

IMPRESSION:  No acute abnormality of the chest

 

Slight enlargement of a 14.4 centimeter fluid collection/simple cystic mass right abdomen

 

Left renal calculi with moderate left hydronephrosis

## 2023-01-04 NOTE — EDPHYS
Physician Documentation                                                                           

 [facility]                                                                 

Name: Juli Rushing                                                                             

Age: 67 yrs                                                                                       

Sex: Female                                                                                       

: 1955                                                                                   

MRN: [id number]                                                                                   

Arrival Date: 2023                                                                          

Time: 10:26                                                                                       

Account#: [id number]                                                                            

Bed CT                                                                                            

Private MD: Anthony Elkins ED Physician Arnold Larsen                                                                      

HPI:                                                                                              

                                                                                             

10:40 This 67 yrs old  Female presents to ER via Unassigned with complaints of Low   cee 

      BP.                                                                                         

10:40 low bp.                                                                                 cee 

13:42 Onset: The symptoms/episode began/occurred this morning. Severity of symptoms: At their cee 

      worst the symptoms were mild in the emergency department the symptoms have improved         

      mildly. The patient has experienced similar episodes in the past, several times.            

                                                                                                  

Historical:                                                                                       

- Allergies:                                                                                      

10:49 Bactrim;                                                                                ap3 

10:49 cefepime;                                                                               ap3 

10:49 Codeine;                                                                                ap3 

10:49 Levofloxacin;                                                                           ap3 

10:49 Morphine; itching;                                                                      ap3 

10:49 PENICILLINS;                                                                            ap3 

10:49 QUINOLONES;                                                                             ap3 

- Home Meds:                                                                                      

10:49 amiodarone 200 mg Oral tab 1 tab 2 times per day [Active]; metoprolol tartrate 25 mg    ap3 

      Oral tab .5 tab 2 times per day [Active];                                                   

- PMHx:                                                                                           

10:49 Atrial fibrillation; Congestive heart failure; Dialysis; High Cholesterol; Hypertensive ap3 

      disorder; stage 4 kidney failure;                                                           

- PSHx:                                                                                           

10:49 back surgery; Dialysis catheter LEFT upper chest;                                       ap3 

                                                                                                  

- Immunization history:: Client reports having NOT received the Covid vaccine.                    

- Social history:: Smoking status: Patient denies any tobacco usage or history of.                

                                                                                                  

                                                                                                  

ROS:                                                                                              

13:43 Constitutional: Negative for fever, chills, and weight loss, Eyes: Negative for injury, cee 

      pain, redness, and discharge, ENT: Negative for injury, pain, and discharge, Neck:          

      Negative for injury, pain, and swelling, Cardiovascular: Negative for chest pain,           

      palpitations, and edema, Abdomen/GI: Negative for abdominal pain, nausea, vomiting,         

      diarrhea, and constipation, Back: Negative for injury and pain, : Negative for            

      injury, bleeding, discharge, and swelling, MS/Extremity: Negative for injury and            

      deformity, Skin: Negative for injury, rash, and discoloration, Psych: Negative for          

      depression, anxiety, suicide ideation, homicidal ideation, and hallucinations,              

      Allergy/Immunology: Negative for hives, rash, and allergies, Endocrine: Negative for        

      neck swelling, polydipsia, polyuria, polyphagia, and marked weight changes,                 

      Hematologic/Lymphatic: Negative for swollen nodes, abnormal bleeding, and unusual           

      bruising.                                                                                   

13:43 Respiratory: Positive for cough, shortness of breath.                                       

13:43 Neuro: Positive for dizziness, weakness.                                                    

                                                                                                  

Exam:                                                                                             

13:43 Constitutional:  This is a well developed, well nourished patient who is awake, alert,  cee 

      and in no acute distress. Head/Face:  Normocephalic, atraumatic. Eyes:  Pupils equal        

      round and reactive to light, extra-ocular motions intact.  Lids and lashes normal.          

      Conjunctiva and sclera are non-icteric and not injected.  Cornea within normal limits.      

      Periorbital areas with no swelling, redness, or edema. ENT:  Nares patent. No nasal         

      discharge, no septal abnormalities noted.  Tympanic membranes are normal and external       

      auditory canals are clear.  Oropharynx with no redness, swelling, or masses, exudates,      

      or evidence of obstruction, uvula midline.  Mucous membranes moist. Neck:  Trachea          

      midline, no thyromegaly or masses palpated, and no cervical lymphadenopathy.  Supple,       

      full range of motion without nuchal rigidity, or vertebral point tenderness.  No            

      Meningismus. Chest/axilla:  Normal chest wall appearance and motion.  Nontender with no     

      deformity.  No lesions are appreciated. Abdomen/GI:  Soft, non-tender, with normal          

      bowel sounds.  No distension or tympany.  No guarding or rebound.  No evidence of           

      tenderness throughout. Back:  No spinal tenderness.  No costovertebral tenderness.          

      Full range of motion. Female :  Normal external genitalia. Skin:  Warm, dry with          

      normal turgor.  Normal color with no rashes, no lesions, and no evidence of cellulitis.     

      MS/ Extremity:  Pulses equal, no cyanosis.  Neurovascular intact.  Full, normal range       

      of motion. Neuro:  Awake and alert, GCS 15, oriented to person, place, time, and            

      situation.  Cranial nerves II-XII grossly intact.  Motor strength 5/5 in all                

      extremities.  Sensory grossly intact.  Cerebellar exam normal.  Normal gait. Psych:         

      Awake, alert, with orientation to person, place and time.  Behavior, mood, and affect       

      are within normal limits.                                                                   

13:43 Cardiovascular: Rate: tachycardic, actual rate is  100 bpm, Rhythm: regular, Pulses:        

      Pulses are 3+ in bilateral radial, brachial, femoral, popliteal, posterior tibial and       

      and dorsalis pedis arteries.. Heart sounds: normal, Edema: is not appreciated.              

13:43 ECG was reviewed by the Attending Physician.                                                

                                                                                                  

Vital Signs:                                                                                      

10:45  / 63; Pulse 88; Resp 19; Temp 98.1; Pulse Ox 98% 4 lpm ; Weight 104.33 kg;       ap3 

      Height 5 ft. 7 in. (170.18 cm);                                                             

10:59  / 62; Pulse 90; Resp 18; Pulse Ox 98% on 4 lpm NC;                               ld1 

12:46 BP 94 / 71; Pulse 100; Resp 18; Pulse Ox 94% on R/A;                                    ld1 

13:47  / 46; Pulse 99; Resp 18; Pulse Ox 96% on R/A;                                    ld1 

14:57  / 50; Pulse 121; Resp 20; Pulse Ox 99% on 4 lpm NC;                              ld1 

19:00  / 87; Pulse 107; Resp 20; Pulse Ox 94% on 3 lpm NC;                              jb4 

10:45 Body Mass Index 36.02 (104.33 kg, 170.18 cm)                                            ap3 

10:45 patient wears 4liters at home                                                           ap3 

                                                                                                  

MDM:                                                                                              

10:38 Patient medically screened.                                                             ece 

14:02 Differential diagnosis: Anemia Anxiety Reaction CHF exacerbation, Myocardial Infarction cee 

      pulmonary edema, Pulmonary Embolism reactive airway disease, Sepsis Unstable Angina.        

      Antibiotic administration: Rocephin and Zithromax given. Differential Diagnosis altered     

      mental status, sepsis, flu. The patient's Wells Deep Vein Thrombosis Score was              

      calculated as follows: Total Score: 0-2 Pts- Low Risk. The patient's pulmonary embolism     

      risk score was calculated as follows: Total Score: 0-2 points. This patient was found       

      to be at low risk for a pulmonary embolism by using the Well's assessment criteria.         

      Immunization status: Pneumococcal vaccine: Influenza vaccine: Data reviewed: vital          

      signs, nurses notes, EMS record, lab test result(s), EKG, radiologic studies. Data          

      interpreted: Cardiac monitor: not applicable for this patient encounter. Pulse              

      oximetry: is not applicable for this patient encounter. Test interpretation: by ED          

      physician or midlevel provider: ECG. Counseling: I had a detailed discussion with the       

      patient and/or guardian regarding: the historical points, exam findings, and any            

      diagnostic results supporting the discharge/admit diagnosis, lab results, radiology         

      results, the need for further work-up and treatment in the hospital.                        

                                                                                                  

                                                                                             

10:43 Order name: Basic Metabolic Panel; Complete Time: 12:50                                 cee 

                                                                                             

10:43 Order name: CBC with Diff; Complete Time: 12:50                                         cee 

                                                                                             

10:43 Order name: LFT's; Complete Time: 12:50                                                 [facility] 

                                                                                             

10:43 Order name: Magnesium; Complete Time: 12:50                                             [facility] 

                                                                                             

10:43 Order name: NT PRO-BNP; Complete Time: 12:50                                            cee 

                                                                                             

10:43 Order name: PT-INR; Complete Time: 12:50                                                [facility] 

                                                                                             

10:43 Order name: Troponin HS; Complete Time: 12:50                                           [facility] 

                                                                                             

10:43 Order name: Lipase; Complete Time: 12:50                                                [facility] 

                                                                                             

10:43 Order name: Blood Culture Adult (2)                                                     [facility] 

                                                                                             

10:43 Order name: Lactate w/ 2H reflex if indic.; Complete Time: 12:50                        [facility] 

                                                                                             

10:43 Order name: COVID-19/FLU A+B; Complete Time: 12:50                                      [facility] 

                                                                                             

10:43 Order name: Urine Culture                                                               cee 

                                                                                             

10:43 Order name: Urine Microscopic Only                                                                                                                                                   

10:47 Order name: TSH; Complete Time: 12:50                                                   ld1 

                                                                                             

10:43 Order name: XRAY Chest (1 view); Complete Time: 12:50                                   [facility] 

                                                                                             

10:43 Order name: EKG; Complete Time: 10:44                                                   [facility] 

                                                                                             

10:43 Order name: Cardiac monitoring; Complete Time: 10:48                                    [facility] 

                                                                                             

10:43 Order name: EKG - Nurse/Tech; Complete Time: 10:48                                      [facility] 

                                                                                             

10:43 Order name: IV Saline Lock; Complete Time: 11:01                                        cee 

                                                                                             

10:43 Order name: Labs collected and sent; Complete Time: 11:01                               [facility] 

                                                                                             

10:43 Order name: O2 Per Protocol; Complete Time: 10:48                                       cee 

                                                                                             

10:43 Order name: O2 Sat Monitoring; Complete Time: 10:48                                     cee 

                                                                                             

11:56 Order name: CBC Smear Scan; Complete Time: 12:50                                        [facility]

                                                                                             

13:38 Order name: CT Chest Abdomen Pelvis W/O Contrast                                        cee 

                                                                                             

14:00 Order name: CONS Physician Consult                                                      [facility]

                                                                                                  

EC:43 Rate is 94 beats/min. Rhythm is irregularly irregular. QRS Axis is Normal. DE interval  cee 

      is normal. QRS interval is normal. QT interval is normal. No Q waves. T waves are           

      Normal. No ST changes noted. Clinical impression: Atrial Fibrillation and No evidence       

      of ischemia. Interpreted by me. Reviewed by me.                                             

                                                                                                  

Administered Medications:                                                                         

10:59 Drug: NS 0.9% 1000 ml Route: IV; Rate: 1 bolus; Site: right antecubital;                ld1 

13:00 Follow up: Response: No adverse reaction; IV Status: Infusion continued                 ld1 

11:35 Drug: Rocephin (cefTRIAXone) 1 grams Route: IV; Rate: per protocol; Site: right         ld1 

      antecubital;                                                                                

12:00 Follow up: Response: No adverse reaction                                                ld1 

                                                                                                  

                                                                                                  

Disposition Summary:                                                                              

23 13:49                                                                                    

Hospitalization Ordered                                                                           

      Hospitalization Status: Inpatient Admission                                             cee 

      Provider: Anthony Elkins cha 

      Location: Telemetry/MedSurg (Inpatient)                                                 cee 

      Condition: Fair                                                                         cee 

      Problem: new                                                                            cee 

      Symptoms: have improved                                                                 cee 

      Bed/Room Type: Standard                                                                 cee 

      Room Assignment: 218(23 19:07)                                                    eb1 

      Diagnosis                                                                                   

        - Dyspnea                                                                             cee 

        - End stage renal disease - on HD                                                     cee 

        - Weakness                                                                            cee 

        - Anemia, unspecified                                                                 cee 

        - Hypotension, unspecified                                                            cee 

        - Chronic atrial fibrillation                                                         cee 

      Forms:                                                                                      

        - Medication Reconciliation Form                                                      cee 

        - SBAR form                                                                           cee 

Signatures:                                                                                       

Dispatcher [employer]                           EDMS                                                 

Arnold Larsen MD MD cha Prokisch, Amanda RN                    RN   ap3                                                  

Angelica Medrano RN                     RN   eb1                                                  

Nerissa Watkins RN                     RN   ld1                                                  

                                                                                                  

Corrections: (The following items were deleted from the chart)                                    

19: 13:49 cee                                                                               eb1 

                                                                                                  

**************************************************************************************************

## 2023-01-04 NOTE — ER
Nurse's Notes                                                                                     

 [facility]                                                                 

Name: Juli Rushing                                                                             

Age: 67 yrs                                                                                       

Sex: Female                                                                                       

: 1955                                                                                   

MRN: [id number]                                                                                   

Arrival Date: 2023                                                                          

Time: 10:26                                                                                       

Account#: [id number]                                                                            

Bed CT                                                                                            

Private MD: Anthony Elkins                                                                         

Diagnosis: Dyspnea;End stage renal disease-on HD;Weakness;Anemia, unspecified;Hypotension,        

  unspecified;Chronic atrial fibrillation                                                         

                                                                                                  

Presentation:                                                                                     

                                                                                             

10:45 Chief complaint: Patient states: she was sent to the ER by her home health nurse due to ap3 

      her blood pressure being low. patient states her blood pressure was 100/50, and she has     

      been having a hard time keeping her oxygen up with her home O2. patient reports her         

      SpO2 at home has been in the low 90s. Coronavirus screen: At this time, the client does     

      not indicate any symptoms associated with coronavirus-19. Ebola Screen: No symptoms or      

      risks identified at this time. Initial Sepsis Screen: Does the patient meet any 2           

      criteria? No. Patient's initial sepsis screen is negative. Does the patient have a          

      suspected source of infection? No. Patient's initial sepsis screen is negative. Risk        

      Assessment: Do you want to hurt yourself or someone else? Patient reports no desire to      

      harm self or others. Onset of symptoms was 2023.                                

10:45 Method Of Arrival: Wheelchair                                                           ap3 

10:45 Acuity: MIRTHA 3                                                                           ap3 

                                                                                                  

Triage Assessment:                                                                                

10:52 General: Appears in no apparent distress. Behavior is cooperative. Pain: Denies pain.   ap3 

      Neuro: Level of Consciousness is awake, alert, obeys commands, Oriented to person,          

      place, time, situation, Speech is normal. Cardiovascular: Patient's skin is warm and        

      dry. Rhythm is atrial fibrillation. Respiratory: Airway is patent Respiratory effort is     

      even, unlabored, Respiratory pattern is regular, symmetrical.                               

                                                                                                  

Historical:                                                                                       

- Allergies:                                                                                      

10:49 Bactrim;                                                                                ap3 

10:49 cefepime;                                                                               ap3 

10:49 Codeine;                                                                                ap3 

10:49 Levofloxacin;                                                                           ap3 

10:49 Morphine; itching;                                                                      ap3 

10:49 PENICILLINS;                                                                            ap3 

10:49 QUINOLONES;                                                                             ap3 

- Home Meds:                                                                                      

10:49 amiodarone 200 mg Oral tab 1 tab 2 times per day [Active]; metoprolol tartrate 25 mg    ap3 

      Oral tab .5 tab 2 times per day [Active];                                                   

- PMHx:                                                                                           

10:49 Atrial fibrillation; Congestive heart failure; Dialysis; High Cholesterol; Hypertensive ap3 

      disorder; stage 4 kidney failure;                                                           

- PSHx:                                                                                           

10:49 back surgery; Dialysis catheter LEFT upper chest;                                       ap3 

                                                                                                  

- Immunization history:: Client reports having NOT received the Covid vaccine.                    

- Social history:: Smoking status: Patient denies any tobacco usage or history of.                

                                                                                                  

                                                                                                  

Screening:                                                                                        

10:52 Abuse screen: Denies threats or abuse. Nutritional screening: No deficits noted.        ap3 

      Tuberculosis screening: No symptoms or risk factors identified.                             

11:00 [facility] ED Fall Risk Assessment (Adult) History of falling in the last 3 months,       ld1 

      including since admission No falls in past 3 months (0 pts).                                

                                                                                                  

Assessment:                                                                                       

11:00 General: Appears in no apparent distress. comfortable, Behavior is calm, cooperative,   ld1 

      appropriate for age. Pain: Complains of pain in back Pain does not radiate. Pain            

      currently is 7 out of 10 on a pain scale. Quality of pain is described as aching,           

      throbbing. Neuro: Level of Consciousness is awake, alert, obeys commands, Oriented to       

      person, place, time, situation. Cardiovascular: Capillary refill < 3 seconds Patient's      

      skin is warm and dry. Cardiovascular: Rhythm is atrial fibrillation. Respiratory: GI:       

      Abdomen is round non-distended. : No signs and/or symptoms were reported regarding        

      the genitourinary system. EENT: No signs and/or symptoms were reported regarding the        

      EENT system. Derm: No signs and/or symptoms reported regarding the dermatologic system.     

      Musculoskeletal: No signs and/or symptoms reported regarding the musculoskeletal system.    

11:11 Cardiovascular: Dialysis shunt: in the anterior aspect of left upper chest, patient     ap3 

      goes to dialysis Tuesday, Thursday and Saturday . :.                                      

14:57 Reassessment: Patient appears in no apparent distress at this time. Patient and/or      ld1 

      family updated on plan of care and expected duration. Pain level reassessed. Patient is     

      alert, oriented x 3, equal unlabored respirations, skin warm/dry/pink.                      

19:27 Reassessment: Patient appears in no apparent distress at this time. Patient and/or      jb4 

      family updated on plan of care and expected duration. Pain level reassessed. Patient is     

      alert, oriented x 3, equal unlabored respirations, skin warm/dry/pink. attempted to         

      call report, instructed to wait for call back.                                              

                                                                                                  

Vital Signs:                                                                                      

10:45  / 63; Pulse 88; Resp 19; Temp 98.1; Pulse Ox 98% 4 lpm ; Weight 104.33 kg;       ap3 

      Height 5 ft. 7 in. (170.18 cm);                                                             

10:59  / 62; Pulse 90; Resp 18; Pulse Ox 98% on 4 lpm NC;                               ld1 

12:46 BP 94 / 71; Pulse 100; Resp 18; Pulse Ox 94% on R/A;                                    ld1 

13:47  / 46; Pulse 99; Resp 18; Pulse Ox 96% on R/A;                                    ld1 

14:57  / 50; Pulse 121; Resp 20; Pulse Ox 99% on 4 lpm NC;                              ld1 

19:00  / 87; Pulse 107; Resp 20; Pulse Ox 94% on 3 lpm NC;                              jb4 

10:45 Body Mass Index 36.02 (104.33 kg, 170.18 cm)                                            ap3 

10:45 patient wears 4liters at home                                                           ap3 

                                                                                                  

ED Course:                                                                                        

10:26 Patient arrived in ED.                                                                  rg4 

10:26 Anthony Elkins MD is Private Physician.                                                 rg4 

10:36 Nerissa Watkins RN is Primary Nurse.                                                   ld1 

10:38 Arnold Larsen MD is Attending Physician.                                             cee 

10:49 Triage completed.                                                                       ap3 

10:53 Arm band placed on left wrist.                                                          ap3 

10:53 Patient has correct armband on for positive identification. Placed in gown. Bed in low  ap3 

      position. Call light in reach. Side rails up X 1. Adult w/ patient. Cardiac monitor on.     

      Pulse ox on. NIBP on. Door closed. Noise minimized. Warm blanket given.                     

10:57 Initial lab(s) drawn, by me, sent to lab. First set of blood cultures drawn by jesus thomas 

      Second set of blood cultures drawn by me, COVID swab sent to lab.                           

11:00 No provider procedures requiring assistance completed. Inserted saline lock: 20 gauge   ld1 

      in right antecubital area, using aseptic technique. Blood collected.                        

11:09 XRAY Chest (1 view) In Process Unspecified.                                             EDMS

11:40 Inserted saline lock: 22 gauge in right wrist, using aseptic technique.                 zm  

13:47 Anthony Elkins MD is Hospitalizing Provider.                                            [facility] 

13:53 CT Chest Abdomen Pelvis W/O Contrast In Process Unspecified.                            EDMS

                                                                                                  

Administered Medications:                                                                         

10:59 Drug: NS 0.9% 1000 ml Route: IV; Rate: 1 bolus; Site: right antecubital;                ld1 

13:00 Follow up: Response: No adverse reaction; IV Status: Infusion continued                 ld1 

11:35 Drug: Rocephin (cefTRIAXone) 1 grams Route: IV; Rate: per protocol; Site: right         ld1 

      antecubital;                                                                                

12:00 Follow up: Response: No adverse reaction                                                ld1 

                                                                                                  

                                                                                                  

Medication:                                                                                       

10:53 VIS not applicable for this client.                                                     ap3 

                                                                                                  

Outcome:                                                                                          

13:49 Decision to Hospitalize by Provider.                                                    cee 

20:00 Patient left the ED.                                                                    jb4 

                                                                                                  

Signatures:                                                                                       

Dispatcher [employer]                           EDMS                                                 

Arnold Larsen MD MD cha Garcia, Rubi                                 rg4                                                  

Rao Conroy RN                       RN   jb4                                                  

Basia Coulter RN                    RN   ap3                                                  

Nerissa Watkins RN                     RN   ld1                                                  

Shameka Rojas7                                                  

Ev Hutchinson                              zm                                                   

                                                                                                  

**************************************************************************************************

## 2023-01-04 NOTE — XMS REPORT
Continuity of Care Document

                           Created on:2023



Patient:SUZI SEARS

Sex:Female

:1955

External Reference #:[id number]





Demographics







                          Address                   [address]



                                                    [place] [address]

 

                          Home Phone                [phone]

 

                          Work Phone                [phone]

 

                          Mobile Phone              [phone]

 

                          Email Address             [email or web address]

 

                          Preferred Language        English

 

                          Marital Status            Unknown

 

                          Protestant Affiliation     Unknown

 

                          Race                      Unknown

 

                          Additional Race(s)        Unavailable



                                                    White

 

                          Ethnic Group              Unknown









Author







                          Organization              [facility]

t

 

                          Address                   [address]



                                                    [place] [address]

 

                          Phone                     [phone]









Support







                Name            Relationship    Address         Phone

 

                JEIMY SEARS ALFRED               [address] [phone]

[phone]



                                                [place] [address] 

 

                Jeimy Sears Spouse          [address] [phone]



                                                [place] [address] 

 

                Jeimy Sears Spouse          [address] [phone]

[phone]



                                                [place] [address] 

 

                Jonah Sears   Child           Unavailable     [phone]

 

                JEIMY SEARS SPOU            [address]       [phone]

[phone]



                                                [place] [address] 

 

                CHITRA SEARS    SPOU            [address]       [phone]



                                                [place] [address] 









Care Team Providers







                    Name                Role                Phone

 

                    CHARISSE COYNE  Primary Care Physician Unavailable

 

                    Clark Cuellar   Attending Clinician Unavailable

 

                    VINAYAK BROUSSARD      Attending Clinician Unavailable

 

                    Luma Beckham MD   Attending Clinician [phone]

 

                    Joanna WIGGINS, Sanket Dukes Attending Clinician [phone]

 

                    Vinayak Broussard MD   Attending Clinician [phone]

 

                    SARBJIT RHODES     Attending Clinician Unavailable

 

                    DO ALISIA REYES    Attending Clinician Unavailable

 

                    Bridger WIGGINS, Adriel Gale Attending Clinician [phone]

 

                    Daylin Little MD   Attending Clinician [phone]

 

                    Jacobo WIGGINS, Quinton Gonsalez Attending Clinician [phone]

[phone]

 

                    Martina Dogulass MD  Attending Clinician [phone]

 

                    Louann Odom MA Attending Clinician Unavailable

 

                    Venancio Barajas      Attending Clinician Unavailable

 

                    Koko         Attending Clinician Unavailable

 

                    Addison WIGGINS, Louann Carrizales Attending Clinician [phone]

[phone]

 

                    Aki Dupree MD      Attending Clinician [phone]

 

                    Luc WIGGINS, Shelby Attending Clinician [phone]

 

                    Fantasma Squires       Attending Clinician [phone]

 

                    Charisse Coyne  Attending Clinician [phone]

 

                    Maureen WIGGINS, Martha Chu Attending Clinician [phone]

[phone]

 

                    Jasper WIGGINS, Parveen LIN Attending Clinician [phone]

 

                    Shaikh ROSALIE, Rosalio     Attending Clinician [phone]

 

                    Christine Saini DO Attending Clinician [phone]

 

                    Edinson WIGGINS, Trev Hays Attending Clinician [phone]

 

                    Hasmukh WIGGINS, Mariluz Sanchez Attending Clinician [phone]

 

                    Suzie Dominguez       Attending Clinician Unavailable

 

                    Hadley Medina         Attending Clinician Unavailable

 

                    Antionette MAGALLANES, Joana      Attending Clinician Unavailable

 

                    Miriam WIGGINS, Suresh Landry Attending Clinician [phone]

 

                    Nieves WIGGINS, Melissa PHAM Attending Clinician [phone]

[phone]

 

                    Lazaro WIGGINS, Naseem Attending Clinician [phone]

 

                    Elaina Clark MD Attending Clinician [phone]

 

                    Brenna Jacobs MD Attending Clinician [phone]

 

                    _VICKEY_Alec_J      Attending Clinician Unavailable

 

                    MD MARTHA LAMAR Attending Clinician Unavailable

 

                    JULIANNA KEANE  Attending Clinician Unavailable

 

                    MAGY EARL     Attending Clinician Unavailable

 

                    MELVIN MANZO   Attending Clinician Unavailable

 

                    MD MELVIN MANZO Attending Clinician Unavailable

 

                    MD DAYLIN LITTLE Attending Clinician Unavailable

 

                    MD MELISSA PICKETT Attending Clinician Unavailable

 

                    Brenna Jacobs    Attending Clinician [phone]

 

                    BRENNA JACOBS    Attending Clinician Unavailable

 

                    Abdi Schulz Attending Clinician [phone]

 

                    Gabriela Rosenthal Attending Clinician [phone]

[phone]

 

                    EMILIA BONILLA        Attending Clinician Unavailable

 

                    DR GOPAL ADEN    Attending Clinician Unavailable

 

                    Yousif Rhodes Attending Clinician [phone]

 

                    Randy Cunningham   Attending Clinician [phone]

 

                    Yoan Lei     Attending Clinician [phone]

 

                    Abbi Somers  Attending Clinician [phone]

 

                    Vignesh See Attending Clinician [phone]

 

                    Charisse Coyne  Admitting Clinician Unavailable

 

                    LUMA BECKHAM      Admitting Clinician Unavailable

 

                    ZHEN JENKINS     Admitting Clinician Unavailable

 

                    MARTINA DOUGLASS     Admitting Clinician Unavailable

 

                    Anthony Elkins     Admitting Clinician Unavailable

 

                    Koko         Admitting Clinician Unavailable

 

                    AKI DUPREE         Admitting Clinician Unavailable

 

                    CHRISTINE SAINI       Admitting Clinician Unavailable

 

                    MD ROSALIO ANGULO     Admitting Clinician Unavailable

 

                    MELISSA PICKETT     Admitting Clinician Unavailable

 

                    DEBBY_EVELIN_Violet      Admitting Clinician Unavailable

 

                    MD MARTHA LAMAR Admitting Clinician Unavailable

 

                    MELVIN MANZO   Admitting Clinician Unavailable

 

                    MD MELVIN MANZO Admitting Clinician Unavailable

 

                    DAYLIN LITTLE      Admitting Clinician Unavailable

 

                    MD KAUSHIK BECKAHM   Admitting Clinician Unavailable

 

                    MD MELISSA PICKETT Admitting Clinician Unavailable

 

                    DR GOPAL ADEN    Admitting Clinician Unavailable









Payers







           Payer Name Policy Type Policy Number Effective Date Expiration Date S

maryam

 

           MEDICARE A B            [id number] 2021            



                                            00:00:00              

 

           [employer]            [id number]            



           [place]                               00:00:00              

 

           [place]:            [id number] 2021            



           [employer]                       00:00:00              







Problems







       Condition Condition Condition Status Onset  Resolution Last   Treating Co

mments 

Source



       Name   Details Category        Date   Date   Treatment Clinician        



                                                 Date                 

 

       Hypotensio Hypotensio Disease Active 2022                             C

[facility]



       n      n                    2-03                               [facility]



                                   00:00:                             [address]

 

       Chest pain Chest pain Disease Active 2022                             M

ethodi



       with high with high               1-05                               st



       risk of risk of               00:00:                             [facility]



       acute  acute                00                                 l



       coronary coronary                                                  



       syndrome syndrome                                                  

 

       Hyperkalem Hyperkalem Disease Active                              M

ethodi



       ia     ia                   



                                   00:00:                             [facility]ospita



                                   00                                 l

 

       Fluid  Fluid  Disease Active                              Methodi



       overload overload                                              st



                                   00:00:                             [facility]ospita



                                   00                                 l

 

       COVID-19 COVID-19 Disease Active                              [facility]

di



       virus  virus                                               st



       detected detected               00:00:                             Hospit

a



                                   00                                 l

 

       Acute back Acute back Disease Active                              M

ethodi



       pain   pain                                                st



                                   00:00:                             [facility]



                                   00                                 l

 

       Sepsis Sepsis Disease Active                              [facility]



                                                                  st



                                   00:00:                             [facility]ospita



                                   00                                 l

 

       ARF (acute ARF (acute Disease Active                              M

ethodi



       renal  renal                



       failure) failure)               00:00:                             Hospit

a



                                   00                                 l

 

       Hypoxemia Hypoxemia Disease Active                              Met

hodi



                                   



                                   00:00:                             [facility]ospita



                                   00                                 l

 

       Pulmonary Pulmonary Disease Active                              Met

hodi



       edema  edema                



                                   00:00:                             [facility]osp[facility]



                                   00                                 l

 

       UTI    UTI    Disease Active                              Methodi



       (urinary (urinary               



       tract  tract                00:00:                             [facility]osp[facility]



       infection) infection)               00                                 l

 

       Shortness Shortness Disease Active                              Met

hodi



       of breath of breath               



                                   00:00:                             [facility]ospita



                                   00                                 l

 

       Venous Venous Disease Active                              Methodi



       stasis stasis               



       ulcer of ulcer of               00:00:                             Hospit

a



       lower  lower                00                                 l



       extremity extremity                                                  

 

       Weight Weight Disease Active                              Methodi



       gain   gain                 



                                   00:00:                             [facility]ospita



                                   00                                 l

 

       Pain in Pain in Disease Active                              Methodi



       wrist  wrist                



                                   00:00:                             [facility]ospita



                                   00                                 l

 

       Prerenal Prerenal Disease Active                              [facility]

di



       azotemia azotemia               



                                   00:00:                             [facility]osp[facility]



                                   00                                 l

 

       Proteinuri Proteinuri Disease Active                              M

ethodi



       a      a                    



                                   00:00:                             [facility]osp[facility]



                                   00                                 l

 

       Peripheral Peripheral Disease Active                              M

ethodi



       venous venous               



       insufficie insufficie               00:00:                             Ho

spita



       ncy    ncy                  00                                 l

 

       Malignant Malignant Disease Active                              Met

hodi



       neoplasm neoplasm               



       of skin of of skin of               00:00:                             Ho

spita



       upper  upper                00                                 l



       extremity extremity                                                  

 

       Migraine Migraine Disease Active                              [facility]

di



       headache headache               



                                   00:00:                             [facility]osp[facility]



                                   00                                 l

 

       Diabetes Diabetes Disease Active                              [facility]

di



       mellitus mellitus               



                                   00:00:                             [facility]osp[facility]



                                   00                                 l

 

       Dysuria Dysuria Disease Active                              [facility]



                                                                  st



                                   00:00:                             [facility]ospita



                                   00                                 l

 

       Edema  Edema  Disease Active                              Methodi



                                   



                                   00:00:                             [facility]ospita



                                   00                                 l

 

       Hyperlipid Hyperlipid Disease Active                       Overview

: Methodi



       emia   emia                                         Formattin st



                                   00:00:                      g of this [facility]ospita



                                   00                          note   l



                                                               might be 



                                                               different 



                                                               from the 



                                                               original. 



                                                               Data   



                                                               migrated 



                                                               from [employer] 



                                                               [employer] 



                                                               y on   



                                                               5/30/15.D 



                                                               nancie    



                                                               migrated 



                                                               from [employer] 



                                                               [employer] 



                                                               y on   



                                                               5/30/15.D 



                                                               nancie    



                                                               migrated 



                                                               from [employer] 



                                                               [employer] 



                                                               y on   



                                                               5/30/15.D 



                                                               nancie    



                                                               migrated 



                                                               from [employer] 



                                                               [employer] 



                                                               y on   



                                                               5/30/15.D 



                                                               nancie    



                                                               migrated 



                                                               from [employer] 



                                                               [employer] 



                                                               y on   



                                                               5/30/15. 

 

       Hyperparat Hyperparat Disease Active                              M

ethodi



       hyroidism hyroidism               



                                   00:00:                             Hospita



                                   00                                 l

 

       Hyperurice Hyperurice Disease Active                       Overview

: Methodi



       keegan    keegan                                          Formattin st



                                   00:00:                      g of this Hospita



                                   00                          note   l



                                                               might be 



                                                               different 



                                                               from the 



                                                               original. 



                                                               Data   



                                                               migrated 



                                                               from [employer] 



                                                               [employer] 



                                                               y on   



                                                               5/30/15.D 



                                                               nancie    



                                                               migrated 



                                                               from [employer] 



                                                               [employer] 



                                                               y on   



                                                               5/30/15.D 



                                                               nancie    



                                                               migrated 



                                                               from [employer] 



                                                               [employer] 



                                                               y on   



                                                               5/30/15.D 



                                                               nancie    



                                                               migrated 



                                                               from [employer] 



                                                               [employer] 



                                                               y on   



                                                               5/30/15.D 



                                                               nancie    



                                                               migrated 



                                                               from [employer] 



                                                               [employer] 



                                                               y on   



                                                               5/30/15. 

 

       Abnormal Abnormal Disease Active                              [facility]

di



       urinalysis urinalysis               



                                   00:00:                             [facility]ospita



                                   00                                 l

 

       Abnormal Abnormal Disease Active                              [facility]

di



       gait   gait                 



                                   00:00:                             [facility]ospita



                                   00                                 l

 

       Ankle  Ankle  Disease Active                              Methodi



       swelling swelling               



                                   00:00:                             [facility]ospita



                                   00                                 l

 

       Atrial Atrial Disease Active                              Methodi



       fibrillati fibrillati               



       on     on                   00:00:                             [facility]ospita



                                   00                                 l

 

       Chronic Chronic Disease Active                              Methodi



       venous venous               923                               st



       stasis stasis               00:00:                             Hospita



                                   00                                 l

 

       Abdominal Abdominal Disease Active                              Met

hodi



       pain   pain                                                st



                                   00:00:                             [facility]ospita



                                   00                                 l

 

       CHF    CHF    Disease Active                              Methodi



       (congestiv (congestiv               6-                               st



       e heart e heart               00:00:                             Hospita



       failure) failure)               00                                 l

 

       Flank pain Flank pain Disease Active                              M

ethodi



                                   6-18                               st



                                   00:00:                             Hospita



                                   00                                 l

 

       Renal  Renal  Disease Active                       Overview: Method

i



       stone  stone                [address]



                                   00:00:                      g of this Hospita



                                   00                          note   l



                                                               might be 



                                                               different 



                                                               from the 



                                                               original. 



                                                               Added  



                                                               automatic 



                                                               ally from 



                                                               request 



                                                               for    



                                                               surgery 



                                                               [id number] 

 

       SOLO     SOLO   Diagnosis Active 2021               [facility]

[facility]



              Active                         12:03:00               l



              2021               00:00:                             [facility]

n



              [facility]               00                                 



              [place]                                                    

 

       Symptomati Symptomati Disease Active                       Overview

: Methodi



       c anemia c anemia               [address]



                                   00:00:                      g of this [facility]ospita



                                   00                          note   l



                                                               might be 



                                                               different 



                                                               from the 



                                                               original. 



                                                               Added  



                                                               automatic 



                                                               ally from 



                                                               request 



                                                               for    



                                                               surgery 



                                                               [id number] 

 

       Acute  Acute  Disease Active                              Methodi



       gallstone gallstone               3-12                               st



       pancreatit pancreatit               00:00:                             Ho

spita



       is     is                   00                                 l

 

       Hypervolem Hypervolem Disease Active                              M

ethodi



       ia     ia                   2-                               st



                                   00:00:                             Hospita



                                   00                                 l

 

       Hypotensio Hypotensio Disease Active 2020                             M

ethodi



       n      n                    2-21                               st



                                   00:00:                             [facility]ospita



                                   00                                 l

 

       Respirator Respirator Disease Active 2020                             M

ethodi



       y failure y failure               1-16                               st



                                   00:00:                             [facility]ospita



                                   00                                 l

 

       Acute  Acute  Disease Active 2020                             Methodi



       cystitis cystitis               1-13                               st



       without without               00:00:                             [facility]ospita



       hematuria hematuria               00                                 l

 

       Class 3 Class 3 Disease Active 2020                             Methodi



       severe severe               0-26                               st



       obesity obesity               00:00:                             [facility]ospita



       without without               00                                 l



       serious serious                                                  



       comorbidit comorbidit                                                  



       y with y with                                                  



       body mass body mass                                                  



       index  index                                                   



       (BMI) of (BMI) of                                                  



       60.0 to 60.0 to                                                  



       69.9 in 69.9 in                                                  



       adult  adult                                                   

 

       Acute  Acute  Disease Active 2020                             Methodi



       renal  renal                0-26                               st



       failure failure               00:00:                             [facility]ospita



       (ARF)  (ARF)                00                                 l

 

       Acute  Acute  Disease Active 2020                             Methodi



       respirator respirator               0-26                               st



       y failure y failure               00:00:                             Hosp

mimi



       with   with                 00                                 l



       hypoxia hypoxia                                                  



       and    and                                                     



       hypercapni hypercapni                                                  



       a      a                                                       

 

       Acute  Acute  Disease Active 2020                             Methodi



       congestive congestive               0-24                               st



       heart  heart                00:00:                             Hospita



       failure failure               00                                 l

 

       Left foot Left foot Disease Active 2019                             Met

hodi



       infection infection               0-15                               st



                                   00:00:                             Hospita



                                   00                                 l

 

       Cellulitis Cellulitis Disease Active                              M

ethodi



       of left of left               9-15                               st



       leg    leg                  00:00:                             Hospita



                                   00                                 l

 

       Bacteremia Bacteremia Disease Active                              M

ethodi



       due to due to                                              st



       Pseudomona Pseudomona               00:00:                             Ho

spita



       s      s                    00                                 l

 

       COLON   COLON Diagnosis Active 2017               [facility]

[place]



       CANCER CANCER                         05:49:00               l



       SCREENING- SCREENING-               00:00:                             He

leighann



       Z12.11 Z12.11               00                                 



              Active                                                  



              2017                                                  



                [facility]                                                  



              [place]                                                    

 

       R92.1 -  R92.1 - Diagnosis Active 2016               

[facility]



       MAMMOGRAPH MAMMOGRAPH                         15:55:00               

l



       IC     IC                   00:01:                             [facility]



       CALCIFCN CALCIFCN               00                                 



       FOUND ON FOUND ON                                                  



              Active                                                  



              2016                                                  



                [facility]                                                  



              [place]                                                  

 

       Z12.31 -   Z12.31 - Diagnosis Active 2016            

   [facility]



       ENCNTR ENCNTR                         07:08:00               l



       SCREEN SCREEN               00:01:                             [facility]



       MAMMOGRAM MAMMOGRAM               00                                 



       FOR MA FOR MA                                                  



              Active                                                  



              2016                                                  



                [facility]                                                  



              [place]                                                  

 

       RT WRIST  RT WRIST Diagnosis Active 2017             

  [facility]



       DISTAL DISTAL               1-          02:03:00               l



       RADIUS RADIUS               09:00:                             [facility]



       CLSD FX CLSD FX               00                                 



              Active                                                  



              2016                                                  



              [facility]                                                  



              [facility]                                                  



              [facility]                                                   

 

       Abnormal Abnormal Disease Active                       Overview: Me

thodi



       glucose glucose               7-02                        Formattin st



       level  level                00:00:                      g of this Hospita



                                   00                          note   l



                                                               might be 



                                                               different 



                                                               from the 



                                                               original. 



                                                               Data   



                                                               migrated 



                                                               from [facility] 



                                                               [facility] 



                                                               y on   



                                                               7/8/15.Da 



                                                               ta     



                                                               migrated 



                                                               from [facility] 



                                                               [facility] 



                                                               y on   



                                                               7/8/15.Da 



                                                               ta     



                                                               migrated 



                                                               from [facility] 



                                                               [facility] 



                                                               y on   



                                                               7/8/15. 

 

       Obstructiv Obstructiv Disease Active                       Overview

: Methodi



       e sleep e sleep               2-04                        Formattin st



       apnea  apnea                00:00:                      g of this Hospita



       syndrome syndrome               00                          note   l



                                                               might be 



                                                               different 



                                                               from the 



                                                               original. 



                                                               Data   



                                                               migrated 



                                                               from [facility] 



                                                               [facility] 



                                                               y on   



                                                               5/30/15.D 



                                                               nancie    



                                                               migrated 



                                                               from [facility] 



                                                               [facility] 



                                                               y on   



                                                               5/30/15.D 



                                                               nancie    



                                                               migrated 



                                                               from [facility] 



                                                               [facility] 



                                                               y on   



                                                               5/30/15.D 



                                                               nancie    



                                                               migrated 



                                                               from [facility] 



                                                               [facility] 



                                                               y on   



                                                               5/30/15.D 



                                                               nancie    



                                                               migrated 



                                                               from [facility] 



                                                               [facility] 



                                                               y on   



                                                               5/30/15.D 



                                                               nancie    



                                                               migrated 



                                                               from [facility] 



                                                               [facility] 



                                                               y on   



                                                               5/30/15. 

 

       Lymphedema Lymphedema Disease Active                       Overview

: Methodi



                                   2-04                        Formatt[address]



                                   00:00:                      g of this Hospita



                                   00                          note   l



                                                               might be 



                                                               different 



                                                               from the 



                                                               original. 



                                                               Data   



                                                               migrated 



                                                               from [facility] 



                                                               [facility] 



                                                               y on   



                                                               5/30/15. 

 

       Depressive Depressive Disease Active                       Overview

: Methodi



       disorder disorder               4-24                        [address]



                                   00:00:                      g of this Hospita



                                   00                          note   l



                                                               might be 



                                                               different 



                                                               from the 



                                                               original. 



                                                               Data   



                                                               migrated 



                                                               from [facility] 



                                                               [facility] 



                                                               y on   



                                                               5/30/15.D 



                                                               nancie    



                                                               migrated 



                                                               from [facility] 



                                                               [facility] 



                                                               y on   



                                                               5/30/15.D 



                                                               nancie    



                                                               migrated 



                                                               from [facility] 



                                                               [facility] 



                                                               y on   



                                                               5/30/15.D 



                                                               nancie    



                                                               migrated 



                                                               from [facility] 



                                                               [facility] 



                                                               y on   



                                                               5/30/15. 

 

       Hypothyroi Hypothyroi Disease Active                       Overview

: Methodi



       dism   dism                 4-24                        [address]



                                   00:00:                      g of this Hospita



                                   00                          note   l



                                                               might be 



                                                               different 



                                                               from the 



                                                               original. 



                                                               Data   



                                                               migrated 



                                                               from [facility] 



                                                               [facility] 



                                                               y on   



                                                               5/30/15. 

 

       Obesity  Obesity Problem Active 2016               [facility]

[facility]ia



       (disorder) (disorder)                         00:23:22               

l



              Active               00:00:                             [facility]



              2012               00                                 



               Problem                                                  



              2016                                                  



              Data                                                    



              migrated                                                  



              from [facility]                                                  



              [employer]                                                  



              on                                                      



              5/30/15.                                                  



              [facility]                                                  



              [facility]                                                  



              [facility]                                                  



              [place]                                                  



              [facility]                                                     



              [place]                                                  



              [facility]                                                  



              [facility]                                                  



              Bone &                                                  



              Joint                                                   

 

       Cobalamin Cobalamin Disease Active                       Overview: 

Methodi



       deficiency deficiency               7-11                        Formattin

 st



                                   00:00:                      g of this Hospita



                                   00                          note   l



                                                               might be 



                                                               different 



                                                               from the 



                                                               original. 



                                                               Data   



                                                               migrated 



                                                               from [facility] 



                                                               [facility] 



                                                               y on   



                                                               5/30/15.D 



                                                               nancie    



                                                               migrated 



                                                               from [facility] 



                                                               [facility] 



                                                               y on   



                                                               5/30/15.D 



                                                               nancie    



                                                               migrated 



                                                               from [facility] 



                                                               [facility] 



                                                               y on   



                                                               5/30/15.D 



                                                               nancie    



                                                               migrated 



                                                               from [facility] 



                                                               [facility] 



                                                               y on   



                                                               5/30/15. 

 

       Hypertensi  Hypertens Problem Active -0        2016            

   [facility]



       ve episode kyle                  7-10          00:23:22               l



       (disorder) episode               00:00:                             [place]

nn



              (disorder)               00                                 



              Active                                                  



              07/10/2012                                                  



              Problem                                                  



              2016                                                  



              Data                                                    



              migrated                                                  



              from [employer]                                                  



              [employer]                                                  



              on                                                      



              5/30/15.                                                  



               ILA Zarate,                                                  



              [facility] [place]                                                  



              Land,                                                  



              [facility]                                                     



              [place]                                                  



              Trace,[facility]                                                  



              [place]                                                  



              Bone &                                                  



              Joint                                                   

 

       Calcificat  Calcifica Problem Resolve               2022           

    [facility]



       ion of tion of        d                    03:11:55               l



       breast breast                                                  [place]



       (finding) (finding)                                                  



              Resolved                                                  



              Problem                                                  



              2022                                                  



               Left                                                   



              Medical                                                  



              Group,                                                  



              ILA Zarate,                                                  



              [facility] [place]                                                  



              Land,                                                  



              S[facility]                                                     



              [place]                                                  



              Trace,[facility]                                                  



              [place]                                                  



              Bone &                                                  



              Joint,                                                  



              [place]                                                  

 

       Acute   Acute Problem Active               2020               Memor

ia



       urinary urinary                             22:36:35               l



       tract  tract                                                   [place]



       infection infection                                                  



       (disorder) (disorder)                                                  



              Active                                                  



              Problem                                                  



              2020                                                  



               Medical                                                  



              Group                                                   

 

       Chronic  Chronic Problem Active               2020               Me

moria



       renal  renal                              22:36:35               l



       impairment impairment                                                  He

rmann



       (disorder) (disorder)                                                  



              Active                                                  



              Problem                                                  



              2020                                                  



               Medical                                                  



              Group,                                                  



              ILA Zarate,                                                  



              [facility] [place]                                                  



              Land,                                                  



              SMR                                                     



              [place]                                                  



              Trace,[facility]                                                  



              [place]                                                  



              Bone &                                                  



              Joint,                                                  



              [place]                                                  

 

       Benign  Benign Problem Active               2022               [place]

milan



       essential essential                             03:11:55               l



       hypertensi hypertensi                                                  He

rmann



       on     on                                                      



       (disorder) (disorder)                                                  



               Active                                                  



              Problem                                                  



              2022                                                  



               Medical                                                  



              Group,                                                  



              OPILIZY Zarate,                                                  



              [facility] [place]                                                  



              Land,                                                  



              SMR                                                     



              [place]                                                  



              [facility]                                                  



              [place]                                                  



              Bone &                                                  



              Joint,                                                  



              [place]                                                  

 

       Cardiorena        Problem Active               2022               M

emoria



       l syndrome Cardiorena                             03:11:55               

l



       (disorder) l syndrome                                                  He

rmann



              (disorder)                                                  



              Active                                                  



              Problem                                                  



              2022                                                  



                                                                    



              Medical                                                  



              Group,                                                  



              [facility] [place]                                                  



              Land,                                                  



              [place]                                                  

 

       Chronic  Chronic Problem Active               2022               Me

moria



       kidney kidney                             03:11:55               l



       disease disease                                                  [place]



       (disorder) (disorder)                                                  



              Active                                                  



              Problem                                                  



              2022                                                  



               Medical                                                  



              Group,                                                  



              [facility] [place]                                                  



              Land,                                                  



              [place]                                                  

 

       Chronic  Chronic Problem Active               2022               Me

moria



       kidney kidney                             03:11:55               l



       disease disease                                                  [place]



       stage 3 stage 3                                                  



       (disorder) (disorder)                                                  



              Active                                                  



              Problem                                                  



              2022                                                  



                                                                    



              Medical                                                  



              Group,                                                  



              [facility]                                                  



              Land,                                                  



              [place]                                                  

 

       Chronic  Chronic Problem Active               2022               Me

moria



       pain   pain                               03:11:55               l



       (finding) (finding)                                                  [place]

daryl



              Active                                                  



              Problem                                                  



              2022                                                  



               Medical                                                  



              Group,                                                  



              [facility] [place]                                                  



              Land,                                                  



              [place]                                                  

 

       Dependence  Dependenc Problem Active               2022            

   [facility]



       on     e on                               03:11:55               l



       supplement supplement                                                  He

adrienne



       al oxygen al oxygen                                                  



       (finding) (finding)                                                  



              Active                                                  



              Problem                                                  



              2022                                                  



               Medical                                                  



              Group,                                                  



              [place]                                                  

 

       Edema of  Edema of Problem Active               2022               

[facility]



       lower  lower                              03:11:55               l



       extremity extremity                                                  [place]

daryl



       (finding) (finding)                                                  



              Active                                                  



              Problem                                                  



              2022                                                  



               Medical                                                  



              Group,                                                  



              [facility]                                                  



              Land,                                                  



              [place]                                                  

 

       Hypertensi  Hypertens Problem Active               2022            

   [facility]



       ve     kyle                                03:11:55               l



       disorder, disorder,                                                  [place]

daryl



       systemic systemic                                                  



       arterial arterial                                                  



       (disorder) (disorder)                                                  



              Active                                                  



              Problem                                                  



              2022                                                  



               Medical                                                  



              Group,[facility]                                                    



              Specialty                                                  



              Hospital                                                  



              [facility] [place]                                                  



              Land,                                                  



              [facility]                                                  



              Land,                                                  



              [place]                                                  

 

       Hypertensi        Problem Active               2022               M

emoria



       ve renal Hypertensi                             03:11:55               l



       disease ve renal                                                  [place]



       (disorder) disease                                                  



              (disorder)                                                  



              Active                                                  



              Problem                                                  



              2022                                                  



                                                                    



              Medical                                                  



              Group,                                                  



              [facility]                                                  



              Land,                                                  



              [place]                                                  

 

       Lymphedema        Problem Active               2022               M

emoria



       of lower Lymphedema                             03:11:55               l



       extremity of lower                                                  [place]

nn



       (disorder) extremity                                                  



              (disorder)                                                  



              Active                                                  



              Problem                                                  



              2022                                                  



                                                                    



              Medical                                                  



              Group,                                                  



              [facility]                                                  



              Land,                                                  



              [place]                                                  

 

       Morbid  Morbid Problem Active               2022               [place]

milan



       obesity obesity                             03:11:55               l



       (disorder) (disorder)                                                  He

rmann



              Active                                                  



              Problem                                                  



              2022                                                  



               Medical                                                  



              Group,                                                  



              [facility]                                                    



              Tessa,                                                  



              [facility]                                                  



              Land,                                                  



              S[facility]                                                     



              [place]                                                  



              Trace,[facility]                                                  



              [place]                                                  



              Bone &                                                  



              Joint,                                                  



              [place]                                                  

 

       Post-disch  Post-disc Problem Active               2022            

   [facility]



       arge   harge                              03:11:55               l



       follow-up follow-up                                                  [place]

daryl



       (finding) (finding)                                                  



              Active                                                  



              Problem                                                  



              2022                                                  



               Medical                                                  



              Group,                                                  



              [place]                                                  

 

       Stasis   Stasis Problem Active               2022               Mem

oria



       ulcer  ulcer                              03:11:55               l



       (disorder) (disorder)                                                  He

rmann



              Active                                                  



              Problem                                                  



              2022                                                  



                                                                    



              Medical                                                  



              Group,                                                  



              [facility]                                                  



              Land,                                                  



              [place]                                                  

 

       Swelling -  Swelling Problem Active               2022             

  [facility]



       edema - - edema -                             03:11:55               l



       symptom symptom                                                  [place]



       (finding) (finding)                                                  



              Active                                                  



              Problem                                                  



              2022                                                  



                                                                    



              Medical                                                  



              Group,                                                  



              [facility]                                                  



              Land,                                                  



              [place]                                                  

 

       Kidney  Kidney Problem Active               2017               [place]

milan



       disease disease                             03:07:28               l



       (disorder) (disorder)                                                  He

rmann



              Active                                                  



              Problem                                                  



              2017                                                  



               [place]                                                  



              [facility]                                                    

 

       RIGHT   RIGHT Diagnosis Active               2016               Mem

oria



       WRIST  WRIST                              15:06:00               l



       DISTAL DISTAL                                                  [place]



       RADIUS RADIUS                                                  



       CLSD FX CLSD FX                                                  



              Active                                                  



              [facility]                                                     



              [place]                                                  



              Bone &                                                  



              Joint                                                   

 

       DISTAL  DISTAL Diagnosis Active               2016               Me

moria



       RADIUS RADIUS                             09:46:00               l



       CLSD FX CLSD FX                                                  [place]



              Active  [facility]                                                     



              [place]                                                  



              Trace                                                   

 

       Long-term        Problem Active               2022               Me

moria



       current Long-term                             03:11:55               l



       use of current                                                  [place]



       drug   use of                                                  



       therapy drug                                                    



       (situation therapy                                                  



       )      (situation                                                  



              ) Active                                                  



              Problem                                                  



              2022                                                  



                                                                    



              Medical                                                  



              Group                                                   

 

       Cholestero  Cholester Problem                      2016            

   [facility]



       l      ol                                 05:02:18               l



       (substance (substance                                                  He

rmann



       )      ) Problem                                                  



              2016                                                  



              Surgical                                                  



              [facility]                                                    

 

       Sleep   Sleep Problem                      2016               [facility]

ia



       apnea  apnea                              05:02:18               l



       (finding) (finding)                                                  [place]

daryl



              Problem                                                  



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



           exposure   00:00:00   00:00:00   non-user              Hospital

 

           Sex Assigned At [address]                       [facility]

[facility]



           [facility]      00:00:[facility]   [facility]:00                         [facility]









                Smoking Status  Start Date      Stop Date       Source

 

                Social [facility]







Medications







       Ordered Filled Start  Stop   Current Ordering Indication Dosage Frequency

 Signature

                    Comments            Components          Source



     Medication Medication Date Date Medication? Clinician                (SIG) 

          



     Name Name                                                   

 

     brimonidine      2022      Yes            1[drp] Q.5D 1 drop 2           

[facility]



     (ALPHAGAN)      2-07                               (two)           [facility]



     0.15 %      13:19:                               times           [facility]



     ophthalmic      20                                 daily.           [facility]



     solution                                                        

 

     apixaban      2022      Yes            2.5mg Q.5D Take 2.5           [facility]



     (ELIQUIS)      2-07                               mg by           [facility]



     2.5 mg Tab      13:19:                               mouth 2           [facility]

[facility]



     tablet      20                                 (two)           Center



                                                  times           



                                                  daily.           

 

     bumetanide      2022      Yes            2mg  QD   Take 2 mg           [facility]



     (BUMEX) 2      2-07                               by mouth           [facility]



     MG tablet      13:19:                               daily.           [facility]

[facility]



               20                                                [facility]

 

     pantoprazol      2022      Yes            40mg QD   Take 40 mg           

[facility]



     e         2-07                               by mouth           [facility]



     (PROTONIX)      13:19:                               daily.           [facility]

al



     40 MG      20                                                Center



     tablet                                                        

 

     brimonidine      2022      Yes            1[drp] Q.5D 1 drop 2           

[facility]



     (ALPHAGAN)      2-07                               (two)           [facility]



     0.15 %      13:19:                               times           [facility]



     ophthalmic      20                                 daily.           Center



     solution                                                        

 

     apixaban      2022      Yes            2.5mg Q.5D Take 2.5           [facility]



     (ELIQUIS)      2-07                               mg by           [facility]



     2.5 mg Tab      13:19:                               mouth 2           [facility]

[facility]



     tablet      20                                 (two)           Center



                                                  times           



                                                  daily.           

 

     bumetanide      2022      Yes            2mg  QD   Take 2 mg           [facility]



     (BUMEX) 2      2-07                               by mouth           [facility]



     MG tablet      13:19:                               daily.           [facility]

[facility]



               20                                                [facility]

 

     pantoprazol      2022      Yes            40mg QD   Take 40 mg           

[facility]



     e         2-07                               by mouth           [facility]



     (PROTONIX)      13:19:                               daily.           [facility]

al



     40 MG      20                                                Center



     tablet                                                        

 

     brimonidine      2022      Yes            1[drp] Q.5D 1 drop 2           

[facility]



     (ALPHAGAN)      2-07                               (two)           [facility]



     0.15 %      13:19:                               times           [facility]



     ophthalmic      20                                 daily.           Center



     solution                                                        

 

     apixaban      2022      Yes            2.5mg Q.5D Take 2.5           [facility]



     (ELIQUIS)      2-07                               mg by           [facility]



     2.5 mg Tab      13:19:                               mouth 2           [facility]

[facility]



     tablet      20                                 (two)           Center



                                                  times           



                                                  daily.           

 

     bumetanide      2022      Yes            2mg  QD   Take 2 mg           [facility]



     (BUMEX) 2      2-07                               by mouth           [facility]



     MG tablet      13:19:                               daily.           [facility]

l



               20                                                Center

 

     pantoprazol      2022      Yes            40mg QD   Take 40 mg           

[facility]



     e         2-07                               by mouth           [facility]



     (PROTONIX)      13:19:                               daily.           [facility]

al



     40 MG      20                                                Center



     tablet                                                        

 

     midodrine      2022- Yes            5mg       Take 1           [facility]



     (PROAMATINE      2-07 02-05                          tablet (5           [facility]

[facility]



     ) 5 MG      00:00: 23:59                          mg total)           [facility]

[facility]



     tablet      00   :00                           by mouth 3           Center



                                                  (three)           



                                                  times           



                                                  daily           



                                                  before           



                                                  meals for           



                                                  60 days.           

 

     midodrine      2022- Yes            5mg       Take 1           [facility]



     (PROAMATINE      2 02-05                          tablet (5           [facility]

[facility]



     ) 5 MG      00:00: 23:59                          mg total)           [facility]

al



     tablet      00   :00                           by mouth 3           Center



                                                  (three)           



                                                  times           



                                                  daily           



                                                  before           



                                                  meals for           



                                                  60 days.           

 

     midodrine      2022- Yes            5mg       Take 1           [facility]



     (PROAMATINE      2 02-05                          tablet (5           [facility]

[facility]



     ) 5 MG      00:00: 23:59                          mg total)           [facility]

al



     tablet      00   :00                           by mouth 3           Center



                                                  (three)           



                                                  times           



                                                  daily           



                                                  before           



                                                  meals for           



                                                  60 days.           

 

     midodrine      2022- No             10mg [id number] Take 10 mg      

     [facility]



     (PROAMATINE      -                     [id number] by mouth 3      

     [facility]



     ) 10 MG      19:03: 00:00                     3D   (three)           [facility]

l



     tablet      43   :00                           times           Center



                                                  daily.           

 

     digoxin      2022- No             125ug      Take 125           [facility]



     (LANOXIN)      2 12-06                          mcg by           [facility]



     0.125 MG      19:03: 00:00                          mouth 3           [facility]

al



     tablet      43   :00                           times per           Center



                                                  week .           

 

     metoprolol      2022- No             25mg Q.5D Take 25 mg           

[facility]



     tartrate      2-06                          by mouth 2           [facility]



     (LOPRESSOR)      19:03: 00:00                          (two)           [facility]

[facility]



     25 MG      43   :00                           times           Center



     tablet                                         daily.           

 

     midodrine      2022- No             10mg Q.[id number] Take 10 mg      

     [facility]



     (PROAMATINE      2--                     [id number] by mouth 3      

     [facility]



     ) 10 MG      19:03: 00:00                     3D   (three)           [facility]

l



     tablet      43   :00                           times           Center



                                                  daily.           

 

     digoxin      2022- No             125ug      Take 125           [facility]



     (LANOXIN)      2- 12-06                          mcg by           [facility]



     0.125 MG      19:03: 00:00                          mouth 3           [facility]

al



     tablet      43   :00                           times per           Center



                                                  week .           

 

     metoprolol      2022- No             25mg Q.5D Take 25 mg           

[facility]



     tartrate      2-06 12-06                          by mouth 2           [facility]



     (LOPRESSOR)      19:03: 00:00                          (two)           [facility]

[facility]



     25 MG      43   :00                           times           Center



     tablet                                         daily.           

 

     digoxin      2022- No             125ug      Take 125           [facility]



     (LANOXIN)                                mcg by           [facility]



     0.125 MG      19:03: 00:00                          mouth 3           [facility]

al



     tablet      43   :00                           times per           Center



                                                  week .           

 

     metoprolol      2022 No             25mg Q.5D Take 25 mg           

[facility]



     tartrate                                by mouth 2           [facility]



     (LOPRESSOR)      19:03: 00:00                          (two)           [facility]

[facility]



     25 MG      43   :00                           times           Center



     tablet                                         daily.           

 

     midodrine      2022 No             10mg Q.[id number] Take 10 mg      

     [facility]



     (PROAMATINE                           [id number] by mouth 3      

     [facility]



     ) 10 MG      19:03: 00:00                     3D   (three)           [facility]

l



     tablet      43   :00                           times           Center



                                                  daily.           

 

     mINOCYCLine      2022      Yes            100mg      Take 1           [facility]



     (MINOCIN,DY      2-06                               capsule           [facility]



     NACIN) 100      00:00:                               (100 mg           [facility]

[facility]



     MG capsule      00                                 total) by           Cent

er



                                                  mouth           



                                                  every 12           



                                                  (twelve)           



                                                  hours.           

 

     mINOCYCLine      2022      Yes            100mg      Take 1           [facility]



     (MINOCIN,DY      2-06                               capsule           [facility]



     NACIN) 100      00:00:                               (100 mg           [facility]

[facility]



     MG capsule      00                                 total) by           Cent

er



                                                  mouth           



                                                  every 12           



                                                  (twelve)           



                                                  hours.           

 

     mINOCYCLine      2022      Yes            100mg      Take 1           [facility]



     (MINOCIN,DY      2-06                               capsule           [facility]



     NACIN) 100      00:00:                               (100 mg           [facility]

[facility]



     MG capsule      00                                 total) by           Cent

er



                                                  mouth           



                                                  every 12           



                                                  (twelve)           



                                                  hours.           

 

     promethazin      2022 No             12.5mg      Take 1           [facility]

[facility]



     e         -21                          tablet           [facility]



     (PHENERGAN)      00:00: 23:59                          (12.5 mg           [facility]

[facility]



     12.5 MG      00   :00                           total) by           Center



     tablet                                         mouth           



                                                  every 6           



                                                  (six)           



                                                  hours as           



                                                  needed for           



                                                  Nausea for           



                                                  up to 15           



                                                  days.           

 

     promethazin      2022- No             12.5mg      Take 1           [facility]

[facility]



     e         -21                          tablet           [facility]



     (PHENERGAN)      00:00: 23:59                          (12.5 mg           [facility]

[facility]



     12.5 MG      00   :00                           total) by           Center



     tablet                                         mouth           



                                                  every 6           



                                                  (six)           



                                                  hours as           



                                                  needed for           



                                                  Nausea for           



                                                  up to 15           



                                                  days.           

 

     promethazin      2022 No             12.5mg      Take 1           [facility]

[facility]



     e         2- 12-21                          tablet           [facility]



     (PHENERGAN)      00:00: 23:59                          (12.5 mg           [facility]

[facility]



     12.5 MG      00   :00                           total) by           Center



     tablet                                         mouth           



                                                  every 6           



                                                  (six)           



                                                  hours as           



                                                  needed for           



                                                  Nausea for           



                                                  up to 15           



                                                  days.           

 

     apixaban      2022- No             2.5mg Q.5D Take 1           [facility]

[facility]



     (ELIQUIS)      -07                          tablet           [facility]



     2.5 mg      10:03: 00:00                          (2.5 mg           [facility]



     tablet      30   :00                           total) by           l



                                                  mouth 2           



                                                  (two)           



                                                  times a           



                                                  day.           

 

     apixaban      2022      Yes            5mg  Q.5D Take 1           [facility]



     (ELIQUIS) 5                                     tablet (5           st



     mg tablet      00:00:                               mg total)           [facility]

[facility]



               00                                 by mouth 2           l



                                                  (two)           



                                                  times a           



                                                  day.           

 

     atorvastati      2022      Yes            10mg QD   Take 10 mg           

[facility]



     n (LIPITOR)                                     by mouth           st



     10 mg      18:12:                               every           [facility]



     tablet      11                                 evening.           l

 

     sucroferric      2022      Yes            500mg Q.[id number] Chew 500      

     [facility]



     oxyhydroxid                                [id number] mg 3           st



     e         18:12:                          3D   (three)           [facility]



     (Velphoro)      11                                 times a           l



     500 mg                                         day with           



     tablet,chew                                         meals.           



     able                                                        

 

     ondansetron      2022      Yes            4mg  Q8H  Take 4 mg           [facility]

[facility]



     (ZOFRAN) 4      06                               by mouth           st



     MG tablet      18:12:                               every 8           [facility]

[facility]



               11                                 (eight)           l



                                                  hours as           



                                                  needed for           



                                                  nausea or           



                                                  vomiting.           

 

     brimonidine      2022      Yes            1[drp] Q.5D Administer         

  [facility]



     (ALPHAGAN                                     1 drop to           st



     P) 0.1 %      18:12:                               both eyes           [facility]

[facility]



     drops      11                                 2 (two)           l



                                                  times a           



                                                  day.           

 

     folic acid      2022      Yes            1mg  QD   Take 1           [facility]

[facility]



     (FOLVITE) 1                                     tablet (1           st



     MG tablet      18:12:                               mg total)           [facility]

[facility]



               11                                 by mouth           l



                                                  daily.           

 

     pantoprazol      2022- No             40mg QD   Take 40 mg          

 [facility]



     e                                   by mouth           st



     (PROTONIX)      16:58: 00:00                          daily.           [facility]

[facility]



     40 MG EC      50   :00                                          l



     tablet                                                        

 

     pantoprazol      2022      Yes            40mg Q.5D Take 1           [facility]

[facility]



     e                                        tablet (40           st



     (PROTONIX)      00:00:                               mg total)           [facility]

[facility]



     40 MG EC      00                                 by mouth 2           l



     tablet                                         (two)           



                                                  times a           



                                                  day.           

 

     acetaminoph      2022- No        20046 1{tbl} Q.5D Take 1           

[facility]



     en-codeine                                tablet by           st



     (TYLENOL      00:00: 04:59                          mouth 2           [facility]

[facility]



     WITH      00   :00                           (two)           l



     CODEINE #3)                                         times a           



     300-30 mg                                         day for 10           



     per tablet                                         days           



                                                  .chronic           



                                                  pain.           

 

     acetaminoph      2022- No        72609 1{tbl} Q.5D Take 1           

[facility]



     en-codeine                                tablet by           st



     (TYLENOL      00:00: 00:00                          mouth 2           [facility]

[facility]



     WITH      00   :00                           (two)           l



     CODEINE #3)                                         times a           



     300-30 mg                                         day for 10           



     per tablet                                         days           



                                                  .acute           



                                                  pain.           

 

     methocarbam      2022- No             500mg Q.25D Take 500          

 [facility]



     oL        -                          mg by           st



     (ROBAXIN)      18:56: 00:00                          mouth 4           [facility]

[facility]



     500 MG      38   :00                           (four)           l



     tablet                                         times a           



                                                  day.           

 

     apixaban      2022- No             2.5mg Q.5D Take 2.5           [facility]

[facility]



     (ELIQUIS) 5                                mg by           st



     mg tablet      18:51: 00:00                          mouth 2           [facility]

[facility]



               03   :00                           (two)           l



                                                  times a           



                                                  day.           

 

     sevelamer      2022- No             1600mg Q.[id number] Take 1,600    

       [facility]



     (RENVELA)                           [id number] mg by           st



     800 mg      18:50: 00:00                     3D   mouth 3           [facility]



     tablet      58   :00                           (three)           l



                                                  times a           



                                                  day with           



                                                  meals.           

 

     oxyCODONE      2022- No        83375 5mg  Q4H  Take 5 mg           [facility]

[facility]



     (ROXICODONE      -03                          by mouth           st



     ) 5 MG      18:50: 00:00                          every 4           [facility]



     immediate      30   :00                           (four)           l



     release                                         hours as           



     tablet                                         needed for           



                                                  moderate           



                                                  pain           



                                                  .acute           



                                                  pain.           

 

     tiZANidine      0      Yes            4mg  Q.5D Take 1           [facility]

[facility]



     (ZANAFLEX)      9-                               tablet (4           st



     4 MG tablet      00:00:                               mg total)           [facility]

[facility]



               00                                 by mouth 2           l



                                                  (two)           



                                                  times a           



                                                  day as           



                                                  needed for           



                                                  muscle           



                                                  spasms.           

 

     promethazin      0      Yes            25mg Q6H  Take 1           [facility]

farhana



     e         8-01                               tablet (25           st



     (PHENERGAN)      00:00:                               mg total)           H

ospita



     25 MG      00                                 by mouth           l



     tablet                                         every 6           



                                                  (six)           



                                                  hours as           



                                                  needed for           



                                                  vomiting           



                                                  or nausea.           

 

     promethazin      0      Yes            50mg [id number] Take 1         

  [facility]



     e         7-28                          [id number] tablet (50           st



     (PHENERGAN)      00:00:                          3D   mg total)           H

ospita



     50 MG      00                                 by mouth 3           l



     tablet                                         (three)           



                                                  times a           



                                                  day as           



                                                  needed for           



                                                  nausea or           



                                                  vomiting.           

 

     diclofenac      0      Yes            75mg Q.5D Take 1           [facility]

di



     (VOLTAREN)      7-26                               tablet (75           st



     75 MG EC      00:00:                               mg total)           [facility]

mimi



     tablet      00                                 by mouth 2           l



                                                  (two)           



                                                  times a           



                                                  day.           

 

     fluconazole      0      Yes            150mg Q7D  Take 1           [facility]

[facility]



     (DIFLUCAN)      7-25                               tablet           st



     150 MG      00:00:                               (150 mg           [facility]



     tablet      00                                 total) by           l



                                                  mouth once           



                                                  a week. On           



                                                  Monday           

 

     DULoxetine      2022- No             60mg QD   Take 60 mg           

[facility]



     (CYMBALTA)                                by mouth           st



     60 MG      18:01: 00:00                          daily.           [facility]



     capsule      54   :00                                          l

 

     gabapentin      -2022- No             300mg Q.5D Take 300           M

[facility]



     (NEURONTIN)      -                          mg by           st



     100 mg      18:01: 00:00                          mouth 2           [facility]



     capsule      54   :00                           (two)           l



                                                  times a           



                                                  day.           

 

     allopurinoL      2022- No             100mg QD   Take 100           

[facility]



     (ZYLOPRIM)      -                          mg by           st



     100 MG      18:01: 00:00                          mouth           [facility]



     tablet      54   :00                           daily.           l

 

     Zithromax      2022-0      Yes                      See            [facility]



     Aircom 250      6-29                               Instructio           l



     mg oral      21:14:                               ns, Take 2           [facility]

daryl



     tablet      00                                 tablets by           



                                                  mouth the           



                                                  first day           



                                                  then 1           



                                                  tablet by           



                                                  mouth days           



                                                  2-5. (Pt           



                                                  is on           



                                                  hemodialys           



                                                  is)., X 5           



                                                  day, # 6           



                                                  tab, 0           



                                                  Refill(s),           



                                                  Pharmacy:           



                                                  [facility],           



                                                  170.18,           



                                                  cm,            



                                                  22           



                                                  14:52:00           



                                                  CDT,           



                                                  Height,           



                                                  106.818,           



                                                  kg,            



                                                  22           



                                                  14...           

 

     Zithromax      2022-0      Yes                      See            [facility]



     Aircom 250      6-29                               Instructio           l



     mg oral      21:14:                               ns, Take 2           [facility]

daryl



     tablet      00                                 tablets by           



                                                  mouth the           



                                                  first day           



                                                  then 1           



                                                  tablet by           



                                                  mouth days           



                                                  2-5. (Pt           



                                                  is on           



                                                  hemodialys           



                                                  is)., X 5           



                                                  day, # 6           



                                                  tab, 0           



                                                  Refill(s),           



                                                  Pharmacy:           



                                                  [facility],           



                                                  170.18,           



                                                  cm,            



                                                  22           



                                                  14:52:00           



                                                  CDT,           



                                                  Height,           



                                                  106.818,           



                                                  kg,            



                                                  22           



                                                  14...           

 

     Zithromax      2-0      Yes                      See            [facility]



     Aircom 250      6-29                               Instructio           l



     mg oral      21:14:                               ns, Take 2           [facility]

daryl



     tablet      00                                 tablets by           



                                                  mouth the           



                                                  first day           



                                                  then 1           



                                                  tablet by           



                                                  mouth days           



                                                  2-5. (Pt           



                                                  is on           



                                                  hemodialys           



                                                  is)., X 5           



                                                  day, # 6           



                                                  tab, 0           



                                                  Refill(s),           



                                                  Pharmacy:           



                                                  [facility],           



                                                  170.18,           



                                                  cm,            



                                                  22           



                                                  14:52:00           



                                                  CDT,           



                                                  Height,           



                                                  106.818,           



                                                  kg,            



                                                  22           



                                                  14...           

 

     Zithromax      2022-0      Yes                      See            [facility]



     Aircom 250      6-29                               Instructio           l



     mg oral      21:14:                               ns, Take 2           [facility]

daryl



     tablet      00                                 tablets by           



                                                  mouth the           



                                                  first day           



                                                  then 1           



                                                  tablet by           



                                                  mouth days           



                                                  2-5. (Pt           



                                                  is on           



                                                  hemodialys           



                                                  is)., X 5           



                                                  day, # 6           



                                                  tab, 0           



                                                  Refill(s),           



                                                  Pharmacy:           



                                                  [facility],           



                                                  170.18,           



                                                  cm,            



                                                  22           



                                                  14:52:00           



                                                  CDT,           



                                                  Height,           



                                                  106.818,           



                                                  kg,            



                                                  22           



                                                  14...           

 

     Zithromax      2022-0      Yes                      See            [facility]      6-29                               Instructio           l



     mg oral      21:14:                               ns, Take 2           [facility]

daryl



     tablet      00                                 tablets by           



                                                  mouth the           



                                                  first day           



                                                  then 1           



                                                  tablet by           



                                                  mouth days           



                                                  2-5. (Pt           



                                                  is on           



                                                  hemodialys           



                                                  is)., X 5           



                                                  day, # 6           



                                                  tab, 0           



                                                  Refill(s),           



                                                  Pharmacy:           



                                                  [facility],           



                                                  170.18,           



                                                  cm,            



                                                  22           



                                                  14:52:00           



                                                  CDT,           



                                                  Height,           



                                                  106.818,           



                                                  kg,            



                                                  22           



                                                  14...           

 

     Zithromax      2022-0      Yes                      [facility]      6-29                               Instructio           l



     mg oral      21:14:                               ns, Take 2           [facility]

daryl



     tablet      00                                 tablets by           



                                                  mouth the           



                                                  first day           



                                                  then 1           



                                                  tablet by           



                                                  mouth days           



                                                  2-5. (Pt           



                                                  is on           



                                                  hemodialys           



                                                  is)., X 5           



                                                  day, # 6           



                                                  tab, 0           



                                                  Refill(s),           



                                                  Pharmacy:           



                                                  [facility],           



                                                  170.18,           



                                                  cm,            



                                                  22           



                                                  14:52:00           



                                                  CDT,           



                                                  Height,           



                                                  106.818,           



                                                  kg,            



                                                  22           



                                                  14...           

 

     Zithromax      2022-0      Yes                      [facility]      6-[facility]                               Instructio           l



     mg oral      21:14:                               ns, Take 2           [facility]

daryl



     tablet      00                                 tablets by           



                                                  mouth the           



                                                  first day           



                                                  then 1           



                                                  tablet by           



                                                  mouth days           



                                                  2-5. (Pt           



                                                  is on           



                                                  hemodialys           



                                                  is)., X 5           



                                                  day, # 6           



                                                  tab, 0           



                                                  Refill(s),           



                                                  Pharmacy:           



                                                  [facility],           



                                                  170.18,           



                                                  cm,            



                                                  22           



                                                  14:52:00           



                                                  CDT,           



                                                  Height,           



                                                  106.818,           



                                                  kg,            



                                                  22           



                                                  14...           

 

     Velphoro      2022-0      Yes                      500 mg,           [facility]

a



               6-29                               CHEW,           l



               20:13:                               Daily,           [facility]



               00                                 take with           



                                                  food, 0           



                                                  Refill(s)           

 

     Velphoro      2022-0      Yes                      500 mg,           [facility]

a



               6-29                               CHEW,           l



               20:13:                               Daily,           [facility]



               00                                 take with           



                                                  food, 0           



                                                  Refill(s)           

 

     Velphoro      2022-0      Yes                      500 mg,           [facility]

a



               6-29                               CHEW,           l



               20:13:                               Daily,           [facility]



               00                                 take with           



                                                  food, 0           



                                                  Refill(s)           

 

     Velphoro      2022-0      Yes                      500 mg,           [facility]

a



               6-29                               CHEW,           l



               20:13:                               Daily,                                            take with           



                                                  food, 0           



                                                  Refill(s)           

 

     Velphoro      2-0      Yes                      500 mg,           [facility]

a



               6-29                               CHEW,           l



               20:13:                               Daily,           [place]                                 take with           



                                                  food, 0           



                                                  Refill(s)           

 

     Velphoro      2022-0      Yes                      500 mg,           [facility]

a



               6-29                               CHEW,           l



               20:13:                               Daily,           [place]                                 take with           



                                                  food, 0           



                                                  Refill(s)           

 

     Velphoro      2022-0      Yes                      500 mg,           [facility]

a



               6-29                               CHEW,           l



               20:13:                               Daily,           [place]                                 take with           



                                                  food, 0           



                                                  Refill(s)           

 

     sevelamer      2-0      Yes                      2,400 mg =           Me

moria



     carbonate      6-29                               3 tab, PO,           l



     800 mg oral      20:12:                               TID-Meals,           

[place]



     tablet      00                                 # 270 tab,           



                                                  0              



                                                  Refill(s)           

 

     sevelamer      2-0      Yes                      2,400 mg =           Me

moria



     carbonate      6-29                               3 tab, PO,           l



     800 mg oral      20:12:                               TID-Meals,           

[place]



     tablet      00                                 # 270 tab,           



                                                  0              



                                                  Refill(s)           

 

     sevelamer      2-0      Yes                      2,400 mg =           Me

moria



     carbonate      6-29                               3 tab, PO,           l



     800 mg oral      20:12:                               TID-Meals,           

[place]



     tablet      00                                 # 270 tab,           



                                                  0              



                                                  Refill(s)           

 

     sevelamer      2-0      Yes                      2,400 mg =           Me

[facility]



     carbonate      6-29                               3 tab, PO,           l



     800 mg oral      20:12:                               TID-Meals,           

[place]



     tablet      00                                 # 270 tab,           



                                                  0              



                                                  Refill(s)           

 

     sevelamer      2-0      Yes                      2,400 mg =           Me

[facility]ia



     carbonate      6-29                               3 tab, PO,           l



     800 mg oral      20:12:                               TID-Meals,           

[place]



     tablet      00                                 # 270 tab,           



                                                  0              



                                                  Refill(s)           

 

     sevelamer      2-0      Yes                      2,400 mg =           Me

[facility]



     carbonate      6-29                               3 tab, PO,           l



     800 mg oral      20:12:                               TID-Meals,           

[place]



     tablet      00                                 # 270 tab,           



                                                  0              



                                                  Refill(s)           

 

     sevelamer      2022-0      Yes                      2,400 mg =           Me

moria



     carbonate      6-29                               3 tab, PO,           l



     800 mg oral      20:12:                               TID-Meals,           

[facility]



     tablet      00                                 # 270 tab,           



                                                  0              



                                                  Refill(s)           

 

     pantoprazol      2022-0      Yes                      40 mg = 1           [facility]

[facility]



     e 40 mg      6-29                               tab, PO,           l



     oral      20:11:                               Daily, 0           [facility]



     enteric      00                                 Refill(s)           



     coated                                                        



     tablet                                                        

 

     pantoprazol      2022-0      Yes                      40 mg = 1           [facility]

[facility]



     e 40 mg      6-29                               tab, PO,           l



     oral      20:11:                               Daily, 0           [place]



     enteric      00                                 Refill(s)           



     coated                                                        



     tablet                                                        

 

     pantoprazol      2022-0      Yes                      40 mg = 1           [facility]

[facility]



     e 40 mg      6-29                               tab, PO,           l



     oral      20:11:                               Daily, 0           [place]



     enteric      00                                 Refill(s)           



     coated                                                        



     tablet                                                        

 

     pantoprazol      2022-0      Yes                      40 mg = 1           [facility]

[facility]



     e 40 mg      6-29                               tab, PO,           l



     oral      20:11:                               Daily, 0           [place]



     enteric      00                                 Refill(s)           



     coated                                                        



     tablet                                                        

 

     pantoprazol      2022-0      Yes                      40 mg = 1           [facility]

[facility]



     e 40 mg      6-29                               tab, PO,           l



     oral      20:11:                               Daily, 0           [place]



     enteric      00                                 Refill(s)           



     coated                                                        



     tablet                                                        

 

     pantoprazol      2-0      Yes                      40 mg = 1           [facility]

[facility]



     e 40 mg      6-29                               tab, PO,           l



     oral      20:11:                               Daily, 0           [facility]



     enteric      00                                 Refill(s)           



     coated                                                        



     tablet                                                        

 

     pantoprazol      2022-0      Yes                      40 mg = 1           [facility]

[facility]



     e 40 mg      6-29                               tab, PO,           l



     oral      20:11:                               Daily, 0           [place]



     enteric      00                                 Refill(s)           



     coated                                                        



     tablet                                                        

 

     oxyCODONE 5      -0      Yes                      5 mg = 1           [facility]

[facility]



     mg oral      6-29                               tab, PO,           l



     tablet,      20:10:                               Q4H, PRN           [facility]

n



     immediate      00                                 Pain, prn,           



     release                                         0              



                                                  Refill(s)           

 

     oxyCODONE 5      -0      Yes                      5 mg = 1           [facility]

[facility]



     mg oral      6-29                               tab, PO,           l



     tablet,      20:10:                               Q4H, PRN           [facility]

n



     immediate      00                                 Pain, prn,           



     release                                         0              



                                                  Refill(s)           

 

     oxyCODONE 5      -0      Yes                      5 mg = 1           [facility]

[facility]



     mg oral      6-29                               tab, PO,           l



     tablet,      20:10:                               Q4H, PRN           [facility]

n



     immediate      00                                 Pain, prn,           



     release                                         0              



                                                  Refill(s)           

 

     oxyCODONE 5      -0      Yes                      5 mg = 1           [facility]

[facility]



     mg oral      6-29                               tab, PO,           l



     tablet,      20:10:                               Q4H, PRN           [facility]

n



     immediate      00                                 Pain, prn,           



     release                                         0              



                                                  Refill(s)           

 

     oxyCODONE 5      -0      Yes                      5 mg = 1           [facility]

[facility]



     mg oral      6-29                               tab, PO,           l



     tablet,      20:10:                               Q4H, PRN           [facility]

n



     immediate      00                                 Pain, prn,           



     release                                         0              



                                                  Refill(s)           

 

     oxyCODONE 5      -0      Yes                      5 mg = 1           [facility]

[facility]



     mg oral      6-29                               tab, PO,           l



     tablet,      20:10:                               Q4H, PRN           [facility]

n



     immediate      00                                 Pain, prn,           



     release                                         0              



                                                  Refill(s)           

 

     oxyCODONE 5      -0      Yes                      5 mg = 1           [facility]

[facility]



     mg oral      6-29                               tab, PO,           l



     tablet,      20:10:                               Q4H, PRN           [facility]

n



     immediate      00                                 Pain, prn,           



     release                                         0              



                                                  Refill(s)           

 

     ondansetron      -0      Yes                      4 mg = 1           [facility]

[facility]



     4 mg oral      6-29                               tab, PO,           l



     tablet,      20:09:                               TID, PRN           [facility]

n



     disintegrat      00                                 Nausea /           



     ing                                          Vomiting,           



                                                  Dissolve           



                                                  tab under           



                                                  tongue, 0           



                                                  Refill(s)           

 

     ondansetron      -0      Yes                      4 mg = 1           [facility]

[facility]



     4 mg oral      6-29                               tab, PO,           l



     tablet,      20:09:                               TID, PRN           [facility]

n



     disintegrat      00                                 Nausea /           



     ing                                          Vomiting,           



                                                  Dissolve           



                                                  tab under           



                                                  tongue, 0           



                                                  Refill(s)           

 

     ondansetron      -0      Yes                      4 mg = 1           [facility]

[facility]



     4 mg oral      6-29                               tab, PO,           l



     tablet,      20:09:                               TID, PRN           [facility]

n



     disintegrat      00                                 Nausea /           



     ing                                          Vomiting,           



                                                  Dissolve           



                                                  tab under           



                                                  tongue, 0           



                                                  Refill(s)           

 

     ondansetron      2-0      Yes                      4 mg = 1           [facility]

[facility]



     4 mg oral      6-29                               tab, PO,           l



     tablet,      20:09:                               TID, PRN           [facility]

n



     disintegrat      00                                 Nausea /           



     ing                                          Vomiting,           



                                                  Dissolve           



                                                  tab under           



                                                  tongue, 0           



                                                  Refill(s)           

 

     ondansetron      2-0      Yes                      4 mg = 1           [facility]

[facility]



     4 mg oral      6-29                               tab, PO,           l



     tablet,      20:09:                               TID, PRN           [facility]

n



     disintegrat      00                                 Nausea /           



     ing                                          Vomiting,           



                                                  Dissolve           



                                                  tab under           



                                                  tongue, 0           



                                                  Refill(s)           

 

     ondansetron      -0      Yes                      4 mg = 1           [facility]

[facility]



     4 mg oral      6-29                               tab, PO,           l



     tablet,      20:09:                               TID, PRN           [facility]

n



     disintegrat      00                                 Nausea /           



     ing                                          Vomiting,           



                                                  Dissolve           



                                                  tab under           



                                                  tongue, 0           



                                                  Refill(s)           

 

     ondansetron      -0      Yes                      4 mg = 1           [facility]

[facility]



     4 mg oral      6-29                               tab, PO,           l



     tablet,      20:09:                               TID, PRN           [facility]

n



     disintegrat      00                                 Nausea /           



     ing                                          Vomiting,           



                                                  Dissolve           



                                                  tab under           



                                                  tongue, 0           



                                                  Refill(s)           

 

     Nitazoxanid      -0      Yes                      500 mg = 1           

[facility]



     e 500 mg      6-29                               tab, PO,           l



     oral tablet      20:08:                               Q12H, 0           [facility]

[facility]



               00                                 Refill(s)           

 

     Nitazoxanid      -0      Yes                      500 mg = 1           

[facility]



     e 500 mg      6-29                               tab, PO,           l



     oral tablet      20:08:                               Q12H, 0           [facility]

[facility]



               00                                 Refill(s)           

 

     Nitazoxanid      -0      Yes                      500 mg = 1           

[facility]



     e 500 mg      6-29                               tab, PO,           l



     oral tablet      20:08:                               Q12H, 0           [facility]

[facility]



               00                                 Refill(s)           

 

     Nitazoxanid      -0      Yes                      500 mg = 1           

[facility]



     e 500 mg      6-29                               tab, PO,           l



     oral tablet      20:08:                               Q12H, 0           [facility]

[facility]



               00                                 Refill(s)           

 

     Nitazoxanid      -0      Yes                      500 mg = 1           

[facility]



     e 500 mg      6-29                               tab, PO,           l



     oral tablet      20:08:                               Q12H, 0           [facility]

[facility]



               00                                 Refill(s)           

 

     Nitazoxanid      -0      Yes                      500 mg = 1           

[facility]



     e 500 mg      6-29                               tab, PO,           l



     oral tablet      20:08:                               Q12H, 0           [facility]

[facility]



               00                                 Refill(s)           

 

     Nitazoxanid      -0      Yes                      500 mg = 1           

[facility]



     e 500 mg      6-29                               tab, PO,           l



     oral tablet      20:08:                               Q12H, 0           [facility]

[facility]



               00                                 Refill(s)           

 

     metoprolol      -0      Yes                      25 mg = 1           [facility]

[facility]



     tartrate 25      6-29                               tab, PO,           l



     mg oral      20:07:                               BID, 0           [place]



     tablet      00                                 Refill(s)           

 

     metoprolol      2022-0      Yes                      25 mg = 1           [facility]

[facility]



     tartrate 25      6-29                               tab, PO,           l



     mg oral      20:07:                               BID, 0           [facility]



     tablet      00                                 Refill(s)           

 

     metoprolol      2-0      Yes                      25 mg = 1           [facility]

[facility]



     tartrate 25      6-29                               tab, PO,           l



     mg oral      20:07:                               BID, 0           [place]



     tablet      00                                 Refill(s)           

 

     metoprolol      2-0      Yes                      25 mg = 1           [facility]

[facility]



     tartrate 25      6-29                               tab, PO,           l



     mg oral      20:07:                               BID, 0           [facility]



     tablet      00                                 Refill(s)           

 

     metoprolol      2-0      Yes                      25 mg = 1           [facility]

[facility]



     tartrate 25      6-29                               tab, PO,           l



     mg oral      20:07:                               BID, 0           [facility]



     tablet      00                                 Refill(s)           

 

     metoprolol      2-0      Yes                      25 mg = 1           [facility]

[facility]



     tartrate 25      6-29                               tab, PO,           l



     mg oral      20:07:                               BID, 0           [place]



     tablet      00                                 Refill(s)           

 

     metoprolol      2-0      Yes                      25 mg = 1           [facility]

[facility]



     tartrate 25      6-29                               tab, PO,           l



     mg oral      20:07:                               BID, 0           [facility]



     tablet      00                                 Refill(s)           

 

     methocarbam      2-0      Yes                      250 mg =           [facility]

[facility]



     ol 500 mg      6-29                               0.5 tab,           l



     oral tablet      20:05:                               PO, TID, 0           

[place]



               00                                 Refill(s)           

 

     methocarbam      2022-0      Yes                      250 mg =           [facility]

[facility]



     ol 500 mg      6-29                               0.5 tab,           l



     oral tablet      20:05:                               PO, TID, 0           

[facility]



               00                                 Refill(s)           

 

     methocarbam      2022-0      Yes                      250 mg =           [facility]

[facility]



     ol 500 mg      6-29                               0.5 tab,           l



     oral tablet      20:05:                               PO, TID, 0           

[facility]



               00                                 Refill(s)           

 

     methocarbam      2022-0      Yes                      250 mg =           [facility]

[facility]



     ol 500 mg      6-29                               0.5 tab,           l



     oral tablet      20:05:                               PO, TID, 0           

[facility]



               00                                 Refill(s)           

 

     methocarbam      2022-0      Yes                      250 mg =           [facility]

[facility]



     ol 500 mg      6-29                               0.5 tab,           l



     oral tablet      20:05:                               PO, TID, 0           

[facility]



               00                                 Refill(s)           

 

     methocarbam      -0      Yes                      250 mg =           [facility]

[facility]



     ol 500 mg      -29                               0.5 tab,           l



     oral tablet      20:05:                               PO, TID, 0           

                                 Refill(s)           

 

     methocarbam      -0      Yes                      250 mg =           [facility]

[facility]



     ol 500 mg      6-29                               0.5 tab,           l



     oral tablet      20:05:                               PO, TID, 0           

                                 Refill(s)           

 

     Alphagan P      -0      Yes                      1 drp,           [facility]

[facility]



     0.1%      -                               OPTH, Q12H           l



     ophthalmic      20:04:                                              [place]



     solution                                                      

 

     folic acid      -0      Yes                      1 mg, PO,           [facility]

[facility]



               6-29                               Daily, 0           l



               20:04:                               Refill(s)           [facility]



                                                               

 

     Alphagan P      -0      Yes                      1 drp,           [facility]

[facility]



     0.1%      -29                               OPTH, Q12H           l



     ophthalmic      20:04:                                              [place]



     solution                                                      

 

     folic acid      -0      Yes                      1 mg, PO,           [facility]

[facility]



               6-29                               Daily, 0           l



               20:04:                               Refill(s)           [facility]



                                                               

 

     Alphagan P      -0      Yes                      1 drp,           [facility]

[facility]



     0.1%      -                               OPTH, Q12H           l



     ophthalmic      20:04:                                              [facility]



     solution                                                      

 

     folic acid      -0      Yes                      1 mg, PO,           [facility]

[facility]



               6-29                               Daily, 0           l



               20:04:                               Refill(s)           [facility]



                                                               

 

     Alphagan P      -0      Yes                      1 drp,           [facility]

[facility]



     0.1%      -                               OPTH, Q12H           l



     ophthalmic      20:04:                                              [facility]



     solution                                                      

 

     folic acid      -0      Yes                      1 mg, PO,           [facility]

[facility]



               6-29                               Daily, 0           l



               20:04:                               Refill(s)           [facility]



                                                               

 

     Alphagan P      -0      Yes                      1 drp,           [facility]

[facility]



     0.1%      -29                               OPTH, Q12H           l



     ophthalmic      20:04:                                              [place]



     solution      00                                                

 

     folic acid      -0      Yes                      1 mg, PO,           [facility]

[facility]



               6-29                               Daily, 0           l



               20:04:                               Refill(s)           [place]



                                                               

 

     Alphagan P      -0      Yes                      1 drp,           [facility]

[facility]



     0.1%      6-29                               OPTH, Q12H           l



     ophthalmic      20:04:                                              [facility]



     solution                                                      

 

     folic acid      2022-0      Yes                      1 mg, PO,           [facility]

[facility]



               6-                               Daily, 0           l



               20:04:                               Refill(s)           [place]



                                                               

 

     Alphagan P      -0      Yes                      1 drp,           [facility]

[facility]



     0.1%                                     OPTH, Q12H           l



     ophthalmic      20:04:                                              [place]



     solution      00                                                

 

     folic acid      0      Yes                      1 mg, PO,           [facility]

[facility]



                                              Daily, 0           l



               20:04:                               Refill(s)           [place]                                                

 

     Oxygen      -0      Yes                      1 btl,           [facility]



               6-                               MISC,           l



               20:03:                               Daily, 2           [facility]



               00                                 liters, 0           



                                                  Refill(s)           

 

     Oxygen      -0      Yes                      1 btl,           [facility]



               6-29                               MISC,           l



               20:03:                               Daily, 2           [place]



               00                                 liters, 0           



                                                  Refill(s)           

 

     Oxygen      -0      Yes                      1 btl,           [facility]



               6-29                               MISC,           l



               20:03:                               Daily, 2           [place]



               00                                 liters, 0           



                                                  Refill(s)           

 

     Oxygen      -0      Yes                      1 btl,           [facility]



               6-                               MISC,           l



               20:03:                               Daily, 2           [place]



               00                                 liters, 0           



                                                  Refill(s)           

 

     Oxygen      -0      Yes                      1 btl,           [facility]



               6-29                               MISC,           l



               20:03:                               Daily, 2           [place]



               00                                 liters, 0           



                                                  Refill(s)           

 

     Oxygen      -0      Yes                      1 btl,           [facility]



               6-29                               MISC,           l



               20:03:                               Daily, 2           [facility]



               00                                 liters, 0           



                                                  Refill(s)           

 

     Oxygen      -0      Yes                      1 btl,           [facility]



               6-                               MISC,           l



               20:03:                               Daily, 2           [facility]



               00                                 liters, 0           



                                                  Refill(s)           

 

     Eliquis 2.5      0      Yes                      2.5 mg,           [facility]

[facility]



     mg oral      6-29                               PO, Q12H,           l



     tablet      20:02:                               tab, 0           [place]



               00                                 Refill(s)           

 

     Eliquis 2.5      -0      Yes                      2.5 mg,           [facility]

[facility]



     mg oral      6-29                               PO, Q12H,           l



     tablet      20:02:                               tab, 0           [place]



               00                                 Refill(s)           

 

     Eliquis 2.5      -0      Yes                      2.5 mg,           [facility]

[facility]



     mg oral      6-29                               PO, Q12H,           l



     tablet      20:02:                               tab, 0           [facility]



               00                                 Refill(s)           

 

     Eliquis 2.5      -0      Yes                      2.5 mg,           [facility]

[facility]



     mg oral      6-29                               PO, Q12H,           l



     tablet      20:02:                               tab, 0           [place]



               00                                 Refill(s)           

 

     Eliquis 2.5      -0      Yes                      2.5 mg,           [facility]

[facility]



     mg oral      6-29                               PO, Q12H,           l



     tablet      20:02:                               tab, 0           [facility]



               00                                 Refill(s)           

 

     Eliquis 2.5      -0      Yes                      2.5 mg,           [facility]

[facility]



     mg oral      6-29                               PO, Q12H,           l



     tablet      20:02:                               tab, 0           [facility]



               00                                 Refill(s)           

 

     Eliquis 2.5      -0      Yes                      2.5 mg,           [facility]

[facility]



     mg oral      6-29                               PO, Q12H,           l



     tablet      20:02:                               tab, 0           [facility]



               00                                 Refill(s)           

 

     doxycycline      2022- No             200mg Q.5D Take 200           

[facility]



     (VIBRAMYCIN                                mg by           st



     ) 100 MG      13:07: 00:00                          mouth 2           [facility]

ta



     capsule      01   :00                           (two)           l



                                                  times a           



                                                  day. Take           



                                                  200mg (x2           



                                                  100mg           



                                                  capsules).           



                                                  Per            



                                                  patient           



                                                  started           



                                                  taking           



                                                  2022           

 

     glucosam/ch      2022- No             1{tbl} Q.5D Take 1           [facility]

[facility]



     on-msm1/C/m                                tablet by           



     ang/evelia      13:07: 00:00                          mouth 2           [facility]

ta



     (OSTEO      01   :00                           (two)           l



     BI-FLEX                                         times a           



     TRIPLE                                         day.           



     STRENGTH                                                        



     ORAL)                                                        

 

     NON       2022- No             1{capsu QD   Take 1           [facility]



     FORMULARY                      le}       capsule by           st



               13:07: 00:00                          mouth           [facility]



               01   :00                           nightly.           l



                                                  Patient           



                                                  takes           



                                                  young           



                                                  living           



                                                  probiotic           



                                                  with 17           



                                                  billion           



                                                  active           



                                                  cultures           

 

     folic acid      2022- No             1mg  QD   Take 1           [facility]

[facility]



     (FOLVITE) 1                                tablet (1           st



     MG tablet      00:00: 04:59                          mg total)           [facility]

[facility]



               00   :00                           by mouth           l



                                                  daily for           



                                                  30 days.           

 

     methocarbam      2022- No             250mg [id number] Take 0.5     

      [facility]



     oL                             [id number] tablets           st



     (ROBAXIN)      00:00: 04:59                     3D   (250 mg           [facility]

[facility]



     500 MG      00   :00                           total) by           l



     tablet                                         mouth 3           



                                                  (three)           



                                                  times a           



                                                  day for 7           



                                                  days.           

 

     nitazoxanid      2022- No             500mg Q.5D Take 1           [facility]

[facility]



     e (ALINIA)                                tablet           st



     500 MG      00:00: 04:59                          (500 mg           [facility]



     tablet      00   :00                           total) by           l



                                                  mouth 2           



                                                  (two)           



                                                  times a           



                                                  day for 7           



                                                  days.           

 

     oxyCODONE      2022- No        17241 5mg  Q4H  Take 1           [facility]

[facility]



     (ROXICODONE                                tablet (5           st



     ) 5 MG      00:00: 04:59                          mg total)           [facility]

ta



     immediate      00   :00                           by mouth           l



     release                                         every 4           



     tablet                                         (four)           



                                                  hours as           



                                                  needed for           



                                                  severe           



                                                  pain for           



                                                  up to 20           



                                                  doses           



                                                  .acute           



                                                  pain. Max           



                                                  Daily           



                                                  Amount: 30           



                                                  mg             

 

     tizanidine      -0      Yes                      4 mg = 1           [facility]

[facility]



     4 mg oral      5-16                               tab, PO,           l



     tablet      18:09:                               Bedtime,           [place]



               00                                 PRN AS           



                                                  NEEDED FOR           



                                                  MUSCLE           



                                                  SPASM, #           



                                                  15 tab, 0           



                                                  Refill(s),           



                                                  Pharmacy:           



                                                  [facility]           



                                                  #[id number],           



                                                  165.1, cm,           



                                                  21           



                                                  15:23:00           



                                                  CST,           



                                                  Height,           



                                                  107.301,           



                                                  kg,            



                                                  21           



                                                  15:23:00           



                                                  CST,           



                                                  Weight           

 

     tizanidine      -0      Yes                      4 mg = 1           [facility]

[facility]



     4 mg oral      5-16                               tab, PO,           l



     tablet      18:09:                               Bedtime,           [place]



               00                                 PRN AS           



                                                  NEEDED FOR           



                                                  MUSCLE           



                                                  SPASM, #           



                                                  15 tab, 0           



                                                  Refill(s),           



                                                  Pharmacy:           



                                                  [facility]           



                                                  #[id number],           



                                                  165.1, cm,           



                                                  21           



                                                  15:23:00           



                                                  CST,           



                                                  Height,           



                                                  107.301,           



                                                  kg,            



                                                  21           



                                                  15:23:00           



                                                  CST,           



                                                  Weight           

 

     tizanidine      -0      Yes                      4 mg = 1           [facility]

[facility]



     4 mg oral      5-16                               tab, PO,           l



     tablet      18:09:                               Bedtime,           [facility]



               00                                 PRN AS           



                                                  NEEDED FOR           



                                                  MUSCLE           



                                                  SPASM, #           



                                                  15 tab, 0           



                                                  Refill(s),           



                                                  Pharmacy:           



                                                  [facility]           



                                                  #[id number],           



                                                  165.1, cm,           



                                                  21           



                                                  15:23:00           



                                                  CST,           



                                                  Height,           



                                                  107.301,           



                                                  kg,            



                                                  21           



                                                  15:23:00           



                                                  CST,           



                                                  Weight           

 

     tizanidine      2022-0      Yes                      4 mg = 1           [facility]

[facility]



     4 mg oral      5-16                               tab, PO,           l



     tablet      18:09:                               Bedtime,           [place]



               00                                 PRN AS           



                                                  NEEDED FOR           



                                                  MUSCLE           



                                                  SPASM, #           



                                                  15 tab, 0           



                                                  Refill(s),           



                                                  Pharmacy:           



                                                  [facility]           



                                                  #[id number],           



                                                  165.1, cm,           



                                                  21           



                                                  15:23:00           



                                                  CST,           



                                                  Height,           



                                                  107.301,           



                                                  kg,            



                                                  21           



                                                  15:23:00           



                                                  CST,           



                                                  Weight           

 

     tizanidine      2022-0      Yes                      4 mg = 1           [facility]

[facility]



     4 mg oral      5-16                               tab, PO,           l



     tablet      18:09:                               Bedtime,           [place]



               00                                 PRN AS           



                                                  NEEDED FOR           



                                                  MUSCLE           



                                                  SPASM, #           



                                                  15 tab, 0           



                                                  Refill(s),           



                                                  Pharmacy:           



                                                  [facility]           



                                                  #[id number],           



                                                  165.1, cm,           



                                                  21           



                                                  15:23:00           



                                                  CST,           



                                                  Height,           



                                                  107.301,           



                                                  kg,            



                                                  21           



                                                  15:23:00           



                                                  CST,           



                                                  Weight           

 

     tizanidine      2-0      Yes                      4 mg = 1           [facility]

[facility]



     4 mg oral      5-16                               tab, PO,           l



     tablet      18:09:                               Bedtime,           [facility]



               00                                 PRN AS           



                                                  NEEDED FOR           



                                                  MUSCLE           



                                                  SPASM, #           



                                                  15 tab, 0           



                                                  Refill(s),           



                                                  Pharmacy:           



                                                  [facility]           



                                                  #[id number],           



                                                  165.1, cm,           



                                                  21           



                                                  15:23:00           



                                                  CST,           



                                                  Height,           



                                                  107.301,           



                                                  kg,            



                                                  21           



                                                  15:23:00           



                                                  CST,           



                                                  Weight           

 

     tizanidine      2-0      Yes                      4 mg = 1           [facility]

[facility]



     4 mg oral      5-16                               tab, PO,           l



     tablet      18:09:                               Bedtime,           [place]



               00                                 PRN AS           



                                                  NEEDED FOR           



                                                  MUSCLE           



                                                  SPASM, #           



                                                  15 tab, 0           



                                                  Refill(s),           



                                                  Pharmacy:           



                                                  [facility]           



                                                  #[id number],           



                                                  165.1, cm,           



                                                  21           



                                                  15:23:00           



                                                  CST,           



                                                  Height,           



                                                  107.301,           



                                                  kg,            



                                                  21           



                                                  15:23:00           



                                                  CST,           



                                                  Weight           

 

     nitrofurant      2-0 2022- No             100mg Q.5D Take 1           [facility]

[facility]



     oin,      3-11 03-15                          capsule           st



     macrocrysta      00:00: 04:59                          (100 mg           [facility]

[facility]



     l-monohydra      00   :00                           total) by           l



     te,                                          mouth 2           



     (MACROBID)                                         (two)           



     100 MG                                         times a           



     capsule                                         day for 3           



                                                  days.           

 

     lidocaine      2022- No             1{patch Q24H Place 1           [facility]

[facility]



     (LIDODERM)      3-09 04-09                }         patch on           st



     5 %       00:00: 04:59                          the skin           [facility]



               00   :00                           in the           l



                                                  morning           



                                                  for 30           



                                                  days.           



                                                  Remove &           



                                                  Discard           



                                                  patch           



                                                  within 12           



                                                  hours or           



                                                  as             



                                                  directed           



                                                  by MD.           

 

     traMADoL      2022- No        [id number] 50mg [id number] Take 1          

 [facility]



     (ULTRAM) 50      3-09 03-20                     [id number] tablet (50      

     st



     mg tablet      00:00: 04:59                     3D   mg total)           [facility]

[facility]



               00   :00                           by mouth           l



                                                  as needed           



                                                  in the           



                                                  morning           



                                                  and 1           



                                                  tablet (50           



                                                  mg total)           



                                                  as needed           



                                                  at noon           



                                                  and 1           



                                                  tablet (50           



                                                  mg total)           



                                                  as needed           



                                                  in the           



                                                  evening           



                                                  for            



                                                  moderate           



                                                  pain. Do           



                                                  all this           



                                                  for up to           



                                                  10             



                                                  days.acute           



                                                  pain.           

 

     metoprolol            Yes            25mg Q.5D Take 25 mg           M

ethodi



     tartrate      2-21                               by mouth 2           st



     (LOPRESSOR)      00:00:                               (two)           [facility]

ta



     25 mg      00                                 times a           l



     tablet                                         day.           

 

     Ondansetron            Yes                      4 mg = 1           Me

moria



     4 MG Oral      1-07                               tab, PO,           l



     Tablet      22:01:                               BID, X 5           [place]



     [Zofran]                                       day, # 10           



                                                  tab, 0           



                                                  Refill(s),           



                                                  Pharmacy:           



                                                  [facility]           



                                                  #[id number],           



                                                  165.1, cm,           



                                                  21           



                                                  15:23:00           



                                                  CST,           



                                                  Height,           



                                                  107.301,           



                                                  kg,            



                                                  21           



                                                  15:23:00           



                                                  CST,           



                                                  Weight           

 

     Ondansetron            Yes                      4 mg = 1           Me

moria



     4 MG Oral      1-07                               tab, PO,           l



     Tablet      22:01:                               BID, X 5           [place]



     [Zofran]      00                                 day, # 10           



                                                  tab, 0           



                                                  Refill(s),           



                                                  Pharmacy:           



                                                  [facility]           



                                                  #[id number],           



                                                  165.1, cm,           



                                                  21           



                                                  15:23:00           



                                                  CST,           



                                                  Height,           



                                                  107.301,           



                                                  kg,            



                                                  21           



                                                  15:23:00           



                                                  CST,           



                                                  Weight           

 

     Ondansetron      2022-0      Yes                      4 mg = 1           Me

moria



     4 MG Oral      1-07                               tab, PO,           l



     Tablet      22:01:                               BID, X 5           [place]



     [Zofran]      00                                 day, # 10           



                                                  tab, 0           



                                                  Refill(s),           



                                                  Pharmacy:           



                                                  [facility]           



                                                  [facility]           



                                                  #[id number],           



                                                  165.1, cm,           



                                                  21           



                                                  15:23:00           



                                                  CST,           



                                                  Height,           



                                                  107.301,           



                                                  kg,            



                                                  21           



                                                  15:23:00           



                                                  CST,           



                                                  Weight           

 

     Ondansetron      2022-0      Yes                      4 mg = 1           Me

moria



     4 MG Oral      1-07                               tab, PO,           l



     Tablet      22:01:                               BID, X 5           [place]



     [Zofran]      00                                 day, # 10           



                                                  tab, 0           



                                                  Refill(s),           



                                                  Pharmacy:           



                                                  [facility]           



                                                  [facility]           



                                                  #[id number],           



                                                  165.1, cm,           



                                                  21           



                                                  15:23:00           



                                                  CST,           



                                                  Height,           



                                                  107.301,           



                                                  kg,            



                                                  21           



                                                  15:23:00           



                                                  CST,           



                                                  Weight           

 

     Ondansetron      2022-0      Yes                      4 mg = 1           Me

moria



     4 MG Oral      1-07                               tab, PO,           l



     Tablet      22:01:                               BID, X 5           [place]



     [Zofran]                                       day, # 10           



                                                  tab, 0           



                                                  Refill(s),           



                                                  Pharmacy:           



                                                  [facility]           



                                                  #[id number],           



                                                  165.1, cm,           



                                                  21           



                                                  15:23:00           



                                                  CST,           



                                                  Height,           



                                                  107.301,           



                                                  kg,            



                                                  21           



                                                  15:23:00           



                                                  CST,           



                                                  Weight           

 

     Ondansetron      2022-0      Yes                      4 mg = 1           Me

moria



     4 MG Oral      1-07                               tab, PO,           l



     Tablet      22:01:                               BID, X 5           [place]



     [Zofran]                                       day, # 10           



                                                  tab, 0           



                                                  Refill(s),           



                                                  Pharmacy:           



                                                  [facility]           



                                                  #[id number],           



                                                  165.1, cm,           



                                                  21           



                                                  15:23:00           



                                                  CST,           



                                                  Height,           



                                                  107.301,           



                                                  kg,            



                                                  21           



                                                  15:23:00           



                                                  CST,           



                                                  Weight           

 

     Ondansetron      2022-0      Yes                      4 mg = 1           Me

moria



     4 MG Oral      1-07                               tab, PO,           l



     Tablet      22:01:                               BID, X 5           [place]



     [Zofran]      00                                 day, # 10           



                                                  tab, 0           



                                                  Refill(s),           



                                                  Pharmacy:           



                                                  [facility]           



                                                  [facility]           



                                                  #[id number],           



                                                  165.1, cm,           



                                                  21           



                                                  15:23:00           



                                                  CST,           



                                                  Height,           



                                                  107.301,           



                                                  kg,            



                                                  21           



                                                  15:23:00           



                                                  CST,           



                                                  Weight           

 

     atorvastati      2021      Yes                      10 mg = 1           M

[facility]



     n 10 mg      2-27                               tab, PO,           l



     oral tablet      21:45:                               Bedtime, #           

[place]



               00                                 90 tab, 0           



                                                  Refill(s),           



                                                  Pharmacy:           



                                                  [facility]           



                                                  [facility]           



                                                  #[id number],           



                                                  165.1, cm,           



                                                  21           



                                                  15:23:00           



                                                  CST,           



                                                  Height,           



                                                  107.301,           



                                                  kg,            



                                                  21           



                                                  15:23:00           



                                                  CST,           



                                                  Weight           

 

     atorvastati      2021      Yes                      10 mg = 1           M

[facility]



     n 10 mg      2-27                               tab, PO,           l



     oral tablet      21:45:                               Bedtime, #           

[place]



               00                                 90 tab, 0           



                                                  Refill(s),           



                                                  Pharmacy:           



                                                  [facility]           



                                                  [facility]           



                                                  #[id number],           



                                                  165.1, cm,           



                                                  21           



                                                  15:23:00           



                                                  CST,           



                                                  Height,           



                                                  107.301,           



                                                  kg,            



                                                  21           



                                                  15:23:00           



                                                  CST,           



                                                  Weight           

 

     atorvas2021      Yes                      10 mg = 1           M

[facility]



     n 10 mg      2-27                               tab, PO,           l



     oral tablet      21:45:                               Bedtime, #           

[place]



               00                                 90 tab, 0           



                                                  Refill(s),           



                                                  Pharmacy:           



                                                  [facility]           



                                                  [facility]           



                                                  #[id number],           



                                                  165.1, cm,           



                                                  21           



                                                  15:23:00           



                                                  CST,           



                                                  Height,           



                                                  107.301,           



                                                  kg,            



                                                  21           



                                                  15:23:00           



                                                  CST,           



                                                  Weight           

 

     atorvastati      2021      Yes                      10 mg = 1           M

[facility]



     n 10 mg      2-27                               tab, PO,           l



     oral tablet      21:45:                               Bedtime, #           

[place]



               00                                 90 tab, 0           



                                                  Refill(s),           



                                                  Pharmacy:           



                                                  [facility]           



                                                  #[id number],           



                                                  165.1, cm,           



                                                  21           



                                                  15:23:00           



                                                  CST,           



                                                  Height,           



                                                  107.301,           



                                                  kg,            



                                                  21           



                                                  15:23:00           



                                                  CST,           



                                                  Weight           

 

     atorvastati      2021      Yes                      10 mg = 1           M

[facility]



     n 10 mg      2-27                               tab, PO,           l



     oral tablet      21:45:                               Bedtime, #           

[place]



               00                                 90 tab, 0           



                                                  Refill(s),           



                                                  Pharmacy:           



                                                  [facility]           



                                                  [facility]           



                                                  #[id number],           



                                                  165.1, cm,           



                                                  21           



                                                  15:23:00           



                                                  CST,           



                                                  Height,           



                                                  107.301,           



                                                  kg,            



                                                  21           



                                                  15:23:00           



                                                  CST,           



                                                  Weight           

 

     atorvastati      2021      Yes                      10 mg = 1           M

[facility]



     n 10 mg      2-27                               tab, PO,           l



     oral tablet      21:45:                               Bedtime, #           

[place]



               00                                 90 tab, 0           



                                                  Refill(s),           



                                                  Pharmacy:           



                                                  [facility]           



                                                  [facility]           



                                                  #[id number],           



                                                  165.1, cm,           



                                                  21           



                                                  15:23:00           



                                                  CST,           



                                                  Height,           



                                                  107.301,           



                                                  kg,            



                                                  21           



                                                  15:23:00           



                                                  CST,           



                                                  Weight           

 

     atorvastati      2021      Yes                      10 mg = 1           M

[facility]



     n 10 mg      2-27                               tab, PO,           l



     oral tablet      21:45:                               Bedtime, #           

[place]



               00                                 90 tab, 0           



                                                  Refill(s),           



                                                  Pharmacy:           



                                                  [facility]           



                                                  [facility]           



                                                  #[id number],           



                                                  165.1, cm,           



                                                  21           



                                                  15:23:00           



                                                  CST,           



                                                  Height,           



                                                  107.301,           



                                                  kg,            



                                                  21           



                                                  15:23:00           



                                                  CST,           



                                                  Weight           

 

     tizanidine      2021      Yes                      4 mg = 1           [facility]

[facility]



     4 mg oral      2-27                               tab, PO,           l



     tablet      21:40:                               Bedtime,           [place]



               00                                 PRN for           



                                                  muscle           



                                                  spasm, #           



                                                  30 tab, 0           



                                                  Refill(s),           



                                                  Pharmacy:           



                                                  [facility]           



                                                  [facility]           



                                                  #[id number],           



                                                  165.1, cm,           



                                                  21           



                                                  15:23:00           



                                                  CST,           



                                                  Height,           



                                                  107.301,           



                                                  kg,            



                                                  21           



                                                  15:23:00           



                                                  CST,           



                                                  Weight           

 

     Acetaminoph      2021      Yes                      1 tab, PO,           

[facility]



     en 325 MG /      2-27                               Q12H, PRN           l



     tramadol      21:40:                               for pain,           [place]

d[facility]



     hydrochlori      00                                 X 15 day,           



     de 37.5 MG                                         # 30 tab,           



     Oral Tablet                                         0              



     [Ultracet]                                         Refill(s),           



                                                  Pharmacy:           



                                                  [facility]           



                                                  [facility]           



                                                  #[id number],           



                                                  165.1, cm,           



                                                  21           



                                                  15:23:00           



                                                  CST,           



                                                  Height,           



                                                  107.301,           



                                                  kg,            



                                                  21           



                                                  15:23:00           



                                                  CST,           



                                                  Weight           

 

     tizanidine      2021      Yes                      4 mg = 1           [facility]

[facility]



     4 mg oral      2-27                               tab, PO,           l



     tablet      21:40:                               Bedtime,           [place]



               00                                 PRN for           



                                                  muscle           



                                                  spasm, #           



                                                  30 tab, 0           



                                                  Refill(s),           



                                                  Pharmacy:           



                                                  [facility]           



                                                  #[id number],           



                                                  165.1, cm,           



                                                  21           



                                                  15:23:00           



                                                  CST,           



                                                  Height,           



                                                  107.301,           



                                                  kg,            



                                                  21           



                                                  15:23:00           



                                                  CST,           



                                                  Weight           

 

     Acetaminoph      2021      Yes                      1 tab, PO,           

[facility]



     en 325 MG /      2-27                               Q12H, PRN           l



     tramadol      21:40:                               for pain,           [facility]

[facility]



     hydrochlori      00                                 X 15 day,           



     de 37.5 MG                                         # 30 tab,           



     Oral Tablet                                         0              



     [Ultracet]                                         Refill(s),           



                                                  Pharmacy:           



                                                  [facility]           



                                                  #[id number],           



                                                  165.1, cm,           



                                                  21           



                                                  15:23:00           



                                                  CST,           



                                                  Height,           



                                                  107.301,           



                                                  kg,            



                                                  21           



                                                  15:23:00           



                                                  CST,           



                                                  Weight           

 

     tizanidine      2021      Yes                      4 mg = 1           [facility]

[facility]



     4 mg oral      2-27                               tab, PO,           l



     tablet      21:40:                               Bedtime,           [place]



               00                                 PRN for           



                                                  muscle           



                                                  spasm, #           



                                                  30 tab, 0           



                                                  Refill(s),           



                                                  Pharmacy:           



                                                  [facility]           



                                                  #[id number],           



                                                  165.1, cm,           



                                                  21           



                                                  15:23:00           



                                                  CST,           



                                                  Height,           



                                                  107.301,           



                                                  kg,            



                                                  21           



                                                  15:23:00           



                                                  CST,           



                                                  Weight           

 

     Acetaminoph      2021      Yes                      1 tab, PO,           

[facility]



     en 325 MG /      2-27                               Q12H, PRN           l



     tramadol      21:40:                               for pain,           [facility]

[facility]



     hydrochlori      00                                 X 15 day,           



     de 37.5 MG                                         # 30 tab,           



     Oral Tablet                                         0              



     [Ultracet]                                         Refill(s),           



                                                  Pharmacy:           



                                                  [facility]           



                                                  #[id number],           



                                                  165.1, cm,           



                                                  21           



                                                  15:23:00           



                                                  CST,           



                                                  Height,           



                                                  107.301,           



                                                  kg,            



                                                  21           



                                                  15:23:00           



                                                  CST,           



                                                  Weight           

 

     tizanidine      2021      Yes                      4 mg = 1           [facility]

[facility]



     4 mg oral      2-27                               tab, PO,           l



     tablet      21:40:                               Bedtime,           [place]



               00                                 PRN for           



                                                  muscle           



                                                  spasm, #           



                                                  30 tab, 0           



                                                  Refill(s),           



                                                  Pharmacy:           



                                                  [facility]           



                                                  #[id number],           



                                                  165.1, cm,           



                                                  21           



                                                  15:23:00           



                                                  CST,           



                                                  Height,           



                                                  107.301,           



                                                  kg,            



                                                  21           



                                                  15:23:00           



                                                  CST,           



                                                  Weight           

 

     Acetaminoph      2021      Yes                      1 tab, PO,           

[facility]



     en 325 MG /      2-27                               Q12H, PRN           l



     tramadol      21:40:                               for pain,           [facility]

[facility]



     hydrochlori      00                                 X 15 day,           



     de 37.5 MG                                         # 30 tab,           



     Oral Tablet                                         0              



     [Ultracet]                                         Refill(s),           



                                                  Pharmacy:           



                                                  [facility]           



                                                  #[id number],           



                                                  165.1, cm,           



                                                  21           



                                                  15:23:00           



                                                  CST,           



                                                  Height,           



                                                  107.301,           



                                                  kg,            



                                                  21           



                                                  15:23:00           



                                                  CST,           



                                                  Weight           

 

     tizanidine      2021      Yes                      4 mg = 1           [facility]

[facility]



     4 mg oral      2-27                               tab, PO,           l



     tablet      21:40:                               Bedtime,           [place]



               00                                 PRN for           



                                                  muscle           



                                                  spasm, #           



                                                  30 tab, 0           



                                                  Refill(s),           



                                                  Pharmacy:           



                                                  [facility]           



                                                  #[id number],           



                                                  165.1, cm,           



                                                  21           



                                                  15:23:00           



                                                  CST,           



                                                  Height,           



                                                  107.301,           



                                                  kg,            



                                                  21           



                                                  15:23:00           



                                                  CST,           



                                                  Weight           

 

     Acetaminoph      2021      Yes                      1 tab, PO,           

[facility]



     en 325 MG /      2-27                               Q12H, PRN           l



     tramadol      21:40:                               for pain,           [facility]

d[facility]



     hydrochlori      00                                 X 15 day,           



     de 37.5 MG                                         # 30 tab,           



     Oral Tablet                                         0              



     [Ultracet]                                         Refill(s),           



                                                  Pharmacy:           



                                                  [facility]           



                                                  #[id number],           



                                                  165.1, cm,           



                                                  21           



                                                  15:23:00           



                                                  CST,           



                                                  Height,           



                                                  107.301,           



                                                  kg,            



                                                  21           



                                                  15:23:00           



                                                  CST,           



                                                  Weight           

 

     tizanidine      2021      Yes                      4 mg = 1           [facility]

[facility]



     4 mg oral      2-27                               tab, PO,           l



     tablet      21:40:                               Bedtime,           [place]



               00                                 PRN for           



                                                  muscle           



                                                  spasm, #           



                                                  30 tab, 0           



                                                  Refill(s),           



                                                  Pharmacy:           



                                                  [facility]           



                                                  #[id number],           



                                                  165.1, cm,           



                                                  21           



                                                  15:23:00           



                                                  CST,           



                                                  Height,           



                                                  107.301,           



                                                  kg,            



                                                  21           



                                                  15:23:00           



                                                  CST,           



                                                  Weight           

 

     Acetaminoph      2021      Yes                      1 tab, PO,           

[facility]



     en 325 MG /      2-27                               Q12H, PRN           l



     tramadol      21:40:                               for pain,           [facility]

[facility]



     hydrochlori      00                                 X 15 day,           



     de 37.5 MG                                         # 30 tab,           



     Oral Tablet                                         0              



     [Ultracet]                                         Refill(s),           



                                                  Pharmacy:           



                                                  [facility]           



                                                  [facility]           



                                                  #[id number],           



                                                  165.1, cm,           



                                                  21           



                                                  15:23:00           



                                                  CST,           



                                                  Height,           



                                                  107.301,           



                                                  kg,            



                                                  21           



                                                  15:23:00           



                                                  CST,           



                                                  Weight           

 

     tizanidine      2021      Yes                      4 mg = 1           [facility]

[facility]



     4 mg oral      2-27                               tab, PO,           l



     tablet      21:40:                               Bedtime,           [place]



               00                                 PRN for           



                                                  muscle           



                                                  spasm, #           



                                                  30 tab, 0           



                                                  Refill(s),           



                                                  Pharmacy:           



                                                  [facility]           



                                                  [facility]           



                                                  #[id number],           



                                                  165.1, cm,           



                                                  21           



                                                  15:23:00           



                                                  CST,           



                                                  Height,           



                                                  107.301,           



                                                  kg,            



                                                  21           



                                                  15:23:00           



                                                  CST,           



                                                  Weight           

 

     Acetaminoph      2021      Yes                      1 tab, PO,           

[facility]



     en 325 MG /      2-27                               Q12H, PRN           l



     tramadol      21:40:                               for pain,           [facility]

d[facility]



     hydrochlori      00                                 X 15 day,           



     de 37.5 MG                                         # 30 tab,           



     Oral Tablet                                         0              



     [Ultracet]                                         Refill(s),           



                                                  Pharmacy:           



                                                  [facility]           



                                                  [facility]           



                                                  #[id number],           



                                                  165.1, cm,           



                                                  21           



                                                  15:23:00           



                                                  CST,           



                                                  Height,           



                                                  107.301,           



                                                  kg,            



                                                  21           



                                                  15:23:00           



                                                  CST,           



                                                  Weight           

 

     sevelamer      2021      Yes                      1,600 mg =           [facility]

[facility]



     800 mg oral      2-27                               2 tab, PO,           l



     tablet      21:21:                               TID-Meals,           [facility]

nn



               00                                 # 180 tab,           



                                                  0              



                                                  Refill(s)           

 

     sevelamer      2021      Yes                      1,600 mg =           Me

[facility]



     800 mg oral      2-27                               2 tab, PO,           l



     tablet      21:21:                               TID-Meals,           [facility]

nn



               00                                 # 180 tab,           



                                                  0              



                                                  Refill(s)           

 

     sevelamer      2021      Yes                      1,600 mg =           Me

[facility]



     800 mg oral      2-27                               2 tab, PO,           l



     tablet      21:21:                               TID-Meals,           [facility]

nn



               00                                 # 180 tab,           



                                                  0              



                                                  Refill(s)           

 

     sevelamer      2021      Yes                      1,600 mg =           Me

[facility]



     800 mg oral      2-27                               2 tab, PO,           l



     tablet      21:21:                               TID-Meals,           [facility]

nn



               00                                 # 180 tab,           



                                                  0              



                                                  Refill(s)           

 

     sevelamer      2021      Yes                      1,600 mg =           Me

[facility]



     800 mg oral      2-27                               2 tab, PO,           l



     tablet      21:21:                               TID-Meals,           [facility]

nn



               00                                 # 180 tab,           



                                                  0              



                                                  Refill(s)           

 

     sevelamer      2021      Yes                      1,600 mg =           Me

[facility]



     800 mg oral      2-27                               2 tab, PO,           l



     tablet      21:21:                               TID-Meals,           [facility]

nn



               00                                 # 180 tab,           



                                                  0              



                                                  Refill(s)           

 

     sevelamer      2021      Yes                      1,600 mg =           Me

[facility]



     800 mg oral      2-27                               2 tab, PO,           l



     tablet      21:21:                               TID-Meals,           [facility]

nn



               00                                 # 180 tab,           



                                                  0              



                                                  Refill(s)           

 

     Mupirocin      2021      Yes                      1 appl,           [facility]

[facility]



     0.02 MG/MG      1-22                               TOP, TID,           l



     Topical      23:21:                               X 5 day, #           [facility]

d[facility]



     Ointment      00                                 22 gm, 0           



                                                  Refill(s),           



                                                  Pharmacy:           



                                                  [facility]           



                                                  #[id number],           



                                                  165.1, cm,           



                                                  21           



                                                  14:08:00           



                                                  CST,           



                                                  Height,           



                                                  136.42,           



                                                  kg,            



                                                  21           



                                                  14:08:00           



                                                  CST,           



                                                  Weight           

 

     Mupirocin      2021      Yes                      1 appl,           [facility]

[facility]



     0.02 MG/MG      1-22                               TOP, TID,           l



     Topical      23:21:                               X 5 day, #           [facility]

[facility]



     Ointment      00                                 22 gm, 0           



                                                  Refill(s),           



                                                  Pharmacy:           



                                                  [facility]           



                                                  #[id number],           



                                                  165.1, cm,           



                                                  21           



                                                  14:08:00           



                                                  CST,           



                                                  Height,           



                                                  136.42,           



                                                  kg,            



                                                  21           



                                                  14:08:00           



                                                  CST,           



                                                  Weight           

 

     Mupirocin      2021      Yes                      1 appl,           [facility]

[facility]



     0.02 MG/MG      1-22                               TOP, TID,           l



     Topical      23:21:                               X 5 day, #           [facility]

d[facility]



     Ointment      00                                 22 gm, 0           



                                                  Refill(s),           



                                                  Pharmacy:           



                                                  [facility]           



                                                  #[id number],           



                                                  165.1, cm,           



                                                  21           



                                                  14:08:00           



                                                  CST,           



                                                  Height,           



                                                  136.42,           



                                                  kg,            



                                                  21           



                                                  14:08:00           



                                                  CST,           



                                                  Weight           

 

     Mupirocin      2021      Yes                      1 appl,           [facility]

[facility]



     0.02 MG/MG      1-22                               TOP, TID,           l



     Topical      23:21:                               X 5 day, #           [facility]

daryl



     Ointment      00                                 22 gm, 0           



                                                  Refill(s),           



                                                  Pharmacy:           



                                                  [facility]           



                                                  [facility]           



                                                  #[id number],           



                                                  165.1, cm,           



                                                  21           



                                                  14:08:00           



                                                  CST,           



                                                  Height,           



                                                  136.42,           



                                                  kg,            



                                                  21           



                                                  14:08:00           



                                                  CST,           



                                                  Weight           

 

     Mupirocin      2021      Yes                      1 appl,           [facility]

[facility]



     0.02 MG/MG      1-22                               TOP, TID,           l



     Topical      23:21:                               X 5 day, #           [facility]

daryl



     Ointment      00                                 22 gm, 0           



                                                  Refill(s),           



                                                  Pharmacy:           



                                                  [facility]           



                                                  #[id number],           



                                                  165.1, cm,           



                                                  21           



                                                  14:08:00           



                                                  CST,           



                                                  Height,           



                                                  136.42,           



                                                  kg,            



                                                  21           



                                                  14:08:00           



                                                  CST,           



                                                  Weight           

 

     Mupirocin      2021      Yes                      1 appl,           [facility]

[facility]



     0.02 MG/MG      1-22                               TOP, TID,           l



     Topical      23:21:                               X 5 day, #           [facility]

daryl



     Ointment      00                                 22 gm, 0           



                                                  Refill(s),           



                                                  Pharmacy:           



                                                  [facility]           



                                                  #[id number],           



                                                  165.1, cm,           



                                                  21           



                                                  14:08:00           



                                                  CST,           



                                                  Height,           



                                                  136.42,           



                                                  kg,            



                                                  21           



                                                  14:08:00           



                                                  CST,           



                                                  Weight           

 

     Mupirocin      2021      Yes                      1 appl,           [facility]

[facility]



     0.02 MG/MG      1-22                               TOP, TID,           l



     Topical      23:21:                               X 5 day, #           [facility]

d[facility]



     Ointment      00                                 22 gm, 0           



                                                  Refill(s),           



                                                  Pharmacy:           



                                                  [facility]           



                                                  #[id number],           



                                                  165.1, cm,           



                                                  21           



                                                  14:08:00           



                                                  CST,           



                                                  Height,           



                                                  136.42,           



                                                  kg,            



                                                  21           



                                                  14:08:00           



                                                  CST,           



                                                  Weight           

 

     bumetanide      2021      Yes                      2 mg = 1           [facility]

[facility]



     2 mg oral      1-19                               tab, PO,           l



     tablet      21:11:                               Daily, #           [facility]



               00                                 30 tab, 0           



                                                  Refill(s)           

 

     bumetanide      2021      Yes                      2 mg = 1           [facility]

[facility]



     2 mg oral      1-19                               tab, PO,           l



     tablet      21:11:                               Daily, #           [facility]



               00                                 30 tab, 0           



                                                  Refill(s)           

 

     bumetanide      2021      Yes                      2 mg = 1           [facility]

[facility]



     2 mg oral      1-19                               tab, PO,           l



     tablet      21:11:                               Daily, #           [facility]



               00                                 30 tab, 0           



                                                  Refill(s)           

 

     bumetanide      2021      Yes                      2 mg = 1           [facility]

[facility]



     2 mg oral      1-19                               tab, PO,           l



     tablet      21:11:                               Daily, #           [facility]



               00                                 30 tab, 0           



                                                  Refill(s)           

 

     bumetanide      2021      Yes                      2 mg = 1           [facility]

[facility]



     2 mg oral      1-19                               tab, PO,           l



     tablet      21:11:                               Daily, #           [place]



               00                                 30 tab, 0           



                                                  Refill(s)           

 

     bumetanide      2021      Yes                      2 mg = 1           [facility]

[facility]



     2 mg oral      1-19                               tab, PO,           l



     tablet      21:11:                               Daily, #           [facility]



               00                                 30 tab, 0           



                                                  Refill(s)           

 

     bumetanide      2021      Yes                      2 mg = 1           [facility]

[facility]



     2 mg oral      1-19                               tab, PO,           l



     tablet      21:11:                               Daily, #           [place]



               00                                 30 tab, 0           



                                                  Refill(s)           

 

     gabapentin      2021      Yes                      200 mg = 2           [facility]

[facility]



     100 MG Oral      1-19                               cap, PO,           l



     Capsule      21:10:                               Bedtime, 0           [facility]

[facility]



               00                                 Refill(s)           

 

     allopurinol      2021      Yes                      100 mg = 1           

[facility]



     100 mg oral      1-19                               tab, PO,           l



     tablet      21:10:                               BID, # 60           [facility]

n



               00                                 tab, 0           



                                                  Refill(s)           

 

     gabapentin      2021      Yes                      200 mg = 2           [facility]

[facility]



     100 MG Oral      1-19                               cap, PO,           l



     Capsule      21:10:                               Bedtime, 0           [facility]

[facility]



               00                                 Refill(s)           

 

     allopurinol      2021      Yes                      100 mg = 1           

[facility]



     100 mg oral      1-19                               tab, PO,           l



     tablet      21:10:                               BID, # 60           [facility]

n



               00                                 tab, 0           



                                                  Refill(s)           

 

     gabapentin      2021      Yes                      200 mg = 2           [facility]

[facility]



     100 MG Oral      1-19                               cap, PO,           l



     Capsule      21:10:                               Bedtime, 0           [facility]

[facility]



               00                                 Refill(s)           

 

     allopurinol      2021      Yes                      100 mg = 1           

[facility]



     100 mg oral      1-19                               tab, PO,           l



     tablet      21:10:                               BID, # 60           [facility]

n



               00                                 tab, 0           



                                                  Refill(s)           

 

     gabapentin      2021      Yes                      200 mg = 2           [facility]

[facility]



     100 MG Oral      1-19                               cap, PO,           l



     Capsule      21:10:                               Bedtime, 0           [facility]

[facility]



               00                                 Refill(s)           

 

     allopurinol      2021      Yes                      100 mg = 1           

[facility]



     100 mg oral      1-19                               tab, PO,           l



     tablet      21:10:                               BID, # 60           [facility]

n



               00                                 tab, 0           



                                                  Refill(s)           

 

     gabapentin      2021      Yes                      200 mg = 2           [facility]

[facility]



     100 MG Oral      1-19                               cap, PO,           l



     Capsule      21:10:                               Bedtime, 0           [facility]

[facility]



               00                                 Refill(s)           

 

     allopurinol      2021      Yes                      100 mg = 1           

[facility]



     100 mg oral      1-19                               tab, PO,           l



     tablet      21:10:                               BID, # 60           [facility]

n



               00                                 tab, 0           



                                                  Refill(s)           

 

     gabapentin      2021      Yes                      200 mg = 2           [facility]

[facility]



     100 MG Oral      1-19                               cap, PO,           l



     Capsule      21:10:                               Bedtime, 0           [facility]

d[facility]



               00                                 Refill(s)           

 

     allopurinol      2021      Yes                      100 mg = 1           

[facility]



     100 mg oral      1-19                               tab, PO,           l



     tablet      21:10:                               BID, # 60           [facility]

n



               00                                 tab, 0           



                                                  Refill(s)           

 

     gabapentin      2021      Yes                      200 mg = 2           [facility]

[facility]



     100 MG Oral      1-19                               cap, PO,           l



     Capsule      21:10:                               Bedtime, 0           [facility]

d[facility]



               00                                 Refill(s)           

 

     allopurinol      2021      Yes                      100 mg = 1           

[facility]



     100 mg oral      1-19                               tab, PO,           l



     tablet      21:10:                               BID, # 60           [facility]

n



               00                                 tab, 0           



                                                  Refill(s)           

 

     midodrine      2021      Yes            10mg [id number] Take 10 mg       

    [facility]



     (PROAMATINE      1-13                          [id number] by mouth 3       

    st



     ) 10 MG      00:00:                          3D   (three)           [facility]



     tablet      00                                 times a           l



                                                  day.           

 

     BUMETanide      2021      Yes            2mg  QD   Take 2 mg           [facility]

[facility]



     (BUMEX) 2      1-12                               by mouth           st



     MG tablet      00:00:                               every           [facility]



               00                                 morning.           l

 

     digOXIN      2021      Yes            125ug [id number] Take 125          

 [facility]



     (LANOXIN)      -12                          [id number] mcg by           st



     125 mcg      00:00:                          3W   mouth 3           [facility]



     (0.125 mg)      00                                 (three)           l



     tablet                                         times a           



                                                  week. On           



                                                  dialysis           



                                                  ,             



                                                  Thursday,           



                                                  Saturday           

 

     carvediloL      2021- No                                      [facility]

i



     (COREG)       02-28                                         st



     3.125 MG      00:00: 00:00                                         [facility]



     tablet      00   :00                                          l

 

     ipratropium      2021- No        [id number] .5mg Q.25D Take 2.5      

     [facility]



     (ATROVENT)      0-05 02-28                          mL (0.5 mg           st



     0.02 %      00:00: 00:00                          total) by           [facility]

ta



     nebulizer      00   :00                           nebulizati           l



     solution                                         on 4           



                                                  (four)           



                                                  times a           



                                                  day.           

 

     QUEtiapine      0      Yes                      1 tablet,           [facility]

[facility]



     50 mg oral      3-30                               once a           l



     tablet      13:55:                               day, 0           [place]



               00                                 Refill(s)           

 

     torsemide      -0      Yes                      1 tablet,           [facility]

[facility]



     20 mg oral      3-30                               twice a           l



     tablet      13:55:                               day, 0           [place]



               00                                 Refill(s)           

 

     QUEtiapine      -0      Yes                      1 tablet,           [facility]

[facility]



     50 mg oral      3-30                               once a           l



     tablet      13:55:                               day, 0           [facility]



               00                                 Refill(s)           

 

     torsemide      -0      Yes                      1 tablet,           [facility]

[facility]



     20 mg oral      3-30                               twice a           l



     tablet      13:55:                               day, 0           [facility]



               00                                 Refill(s)           

 

     QUEtiapine      -0      Yes                      1 tablet,           [facility]

[facility]



     50 mg oral      3-30                               once a           l



     tablet      13:55:                               day, 0           [place]



               00                                 Refill(s)           

 

     torsemide      -0      Yes                      1 tablet,           [facility]

[facility]



     20 mg oral      3-30                               twice a           l



     tablet      13:55:                               day, 0           [place]



               00                                 Refill(s)           

 

     QUEtiapine      -0      Yes                      1 tablet,           [facility]

[facility]



     50 mg oral      3-30                               once a           l



     tablet      13:55:                               day, 0           [facility]



               00                                 Refill(s)           

 

     torsemide      -0      Yes                      1 tablet,           [facility]

[facility]



     20 mg oral      3-30                               twice a           l



     tablet      13:55:                               day, 0           [place]



               00                                 Refill(s)           

 

     QUEtiapine      2021-0      Yes                      1 tablet,           [facility]

[facility]



     50 mg oral      3-30                               once a           l



     tablet      13:55:                               day, 0           [facility]



               00                                 Refill(s)           

 

     torsemide      2021-0      Yes                      1 tablet,           [facility]

[facility]



     20 mg oral      3-30                               twice a           l



     tablet      13:55:                               day, 0           [place]



               00                                 Refill(s)           

 

     QUEtiapine      2021-0      Yes                      1 tablet,           [facility]

[facility]



     50 mg oral      3-30                               once a           l



     tablet      13:55:                               day, 0           [place]



               00                                 Refill(s)           

 

     torsemide      2021-0      Yes                      1 tablet,           [facility]

[facility]



     20 mg oral      3-30                               twice a           l



     tablet      13:55:                               day, 0           [facility]



               00                                 Refill(s)           

 

     QUEtiapine      2021-0      Yes                      1 tablet,           [facility]

[facility]



     50 mg oral      3-30                               once a           l



     tablet      13:55:                               day, 0           [facility]



               00                                 Refill(s)           

 

     torsemide      2021-0      Yes                      1 tablet,           [facility]

[facility]



     20 mg oral      3-30                               twice a           l



     tablet      13:55:                               day, 0           [place]



               00                                 Refill(s)           

 

     potassium      2021-0      Yes                      1 tablet,           [facility]

[facility]



     chloride 20      3-30                               once a           l



     mEq oral      13:54:                               day, 0           [place]



     tablet,      00                                 Refill(s)           



     extended                                                        



     release                                                        



     (KCL)                                                        

 

     potassium      2021-0      Yes                      1 tablet,           [facility]

[facility]



     chloride 20      3-30                               once a           l



     mEq oral      13:54:                               day, 0           [facility]



     tablet,      00                                 Refill(s)           



     extended                                                        



     release                                                        



     (KCL)                                                        

 

     potassium      2021-0      Yes                      1 tablet,           [facility]

[facility]



     chloride 20      3-30                               once a           l



     mEq oral      13:54:                               day, 0           [place]



     tablet,      00                                 Refill(s)           



     extended                                                        



     release                                                        



     (KCL)                                                        

 

     potassium      2021-0      Yes                      1 tablet,           [facility]

[facility]



     chloride 20      3-30                               once a           l



     mEq oral      13:54:                               day, 0           [facility]



     tablet,      00                                 Refill(s)           



     extended                                                        



     release                                                        



     (KCL)                                                        

 

     potassium      2021-0      Yes                      1 tablet,           [facility]

[facility]



     chloride 20      3-30                               once a           l



     mEq oral      13:54:                               day, 0           [place]



     tablet,      00                                 Refill(s)           



     extended                                                        



     release                                                        



     (KCL)                                                        

 

     potassium      2021-0      Yes                      1 tablet,           [facility]

[facility]



     chloride 20      3-30                               once a           l



     mEq oral      13:54:                               day, 0           [place]



     tablet,      00                                 Refill(s)           



     extended                                                        



     release                                                        



     (KCL)                                                        

 

     potassium      2021-0      Yes                      1 tablet,           [facility]

[facility]



     chloride 20      3-30                               once a           l



     mEq oral      13:54:                               day, 0           [place]



     tablet,      00                                 Refill(s)           



     extended                                                        



     release                                                        



     (KCL)                                                        

 

     allopurinol            Yes                      1/2            [facility]

[facility]



     300 mg oral      3-30                               tablet,           l



     tablet      13:53:                               once a           [place]



               00                                 day, 0           



                                                  Refill(s)           

 

     carvedilol            Yes                      1 tablet,           [facility]

[facility]



     3.125 mg      3-30                               twice a           l



     oral tablet      13:53:                               day, 0           [facility]

[facility]



               00                                 Refill(s)           

 

     Digoxin            Yes                      1 tablet,           [facility]

[facility]



     0.125 MG      3-30                               once a           l



     Oral Tablet      13:53:                               day, 0           [facility]

[facility]



               00                                 Refill(s)           

 

     DULoxetine            Yes                      1 capsule,           [facility]

[facility]



     60 mg oral      3-30                               once a           l



     delayed      13:53:                               day, 0           [place]



     release      00                                 Refill(s)           



     capsule                                                        

 

     apixaban 5            Yes                      1 tablet,           [facility]

[facility]



     MG Oral      3-30                               twice a           l



     Tablet      13:53:                               day, 0           [place]



     [Eliquis]      00                                 Refill(s)           

 

     gabapentin            Yes                      1 capsule,           [facility]

[facility]



     300 MG Oral      3-30                               twice a           l



     Capsule      13:53:                               day, 0           [facility]



               00                                 Refill(s)           

 

     metoprolol            Yes                      1 tablet,           [facility]

[facility]



     tartrate 50      3-30                               Twice a           l



     mg oral      13:53:                               day, 0           [place]



     tablet      00                                 Refill(s)           

 

     midodrine 5            Yes                      1 tablet,           [facility]

[facility]



     mg oral      3-30                               twice a           l



     tablet      13:53:                               day, 0           [place]



               00                                 Refill(s)           

 

     allopurinol            Yes                      1/2            [facility]

[facility]



     300 mg oral      3-30                               tablet,           l



     tablet      13:53:                               once a           [place]



               00                                 day, 0           



                                                  Refill(s)           

 

     carvedilol            Yes                      1 tablet,           [facility]

[facility]



     3.125 mg      3-30                               twice a           l



     oral tablet      13:53:                               day, 0           [facility]

[facility]



               00                                 Refill(s)           

 

     Digoxin            Yes                      1 tablet,           [facility]

[facility]



     0.125 MG      3-30                               once a           l



     Oral Tablet      13:53:                               day, 0           [facility]

[facility]



               00                                 Refill(s)           

 

     DULoxetine            Yes                      1 capsule,           [facility]

[facility]



     60 mg oral      3-30                               once a           l



     delayed      13:53:                               day, 0           [place]



     release      00                                 Refill(s)           



     capsule                                                        

 

     apixaban 5      0      Yes                      1 tablet,           [facility]

[facility]



     MG Oral      3-30                               twice a           l



     Tablet      13:53:                               day, 0           [facility]



     [Eliquis]      00                                 Refill(s)           

 

     gabapentin            Yes                      1 capsule,           [facility]

[facility]



     300 MG Oral      3-30                               twice a           l



     Capsule      13:53:                               day, 0           [place]



               00                                 Refill(s)           

 

     metoprolol            Yes                      1 tablet,           [facility]

[facility]



     tartrate 50      3-30                               Twice a           l



     mg oral      13:53:                               day, 0           [place]



     tablet      00                                 Refill(s)           

 

     midodrine 5            Yes                      1 tablet,           [facility]

[facility]



     mg oral      3-30                               twice a           l



     tablet      13:53:                               day, 0           [place]



               00                                 Refill(s)           

 

     allopurinol            Yes                      1/2            [facility]

[facility]



     300 mg oral      3-30                               tablet,           l



     tablet      13:53:                               once a           [facility]



               00                                 day, 0           



                                                  Refill(s)           

 

     carvedilol            Yes                      1 tablet,           [facility]

[facility]



     3.125 mg      3-30                               twice a           l



     oral tablet      13:53:                               day, 0           [facility]

[facility]



               00                                 Refill(s)           

 

     Digoxin            Yes                      1 tablet,           [facility]

[facility]



     0.125 MG      3-30                               once a           l



     Oral Tablet      13:53:                               day, 0           [facility]

[facility]



               00                                 Refill(s)           

 

     DULoxetine            Yes                      1 capsule,           [facility]

[facility]



     60 mg oral      3-30                               once a           l



     delayed      13:53:                               day, 0           [facility]



     release      00                                 Refill(s)           



     capsule                                                        

 

     apixaban 5            Yes                      1 tablet,           [facility]

[facility]



     MG Oral      3-30                               twice a           l



     Tablet      13:53:                               day, 0           [facility]



     [Eliquis]      00                                 Refill(s)           

 

     gabapentin            Yes                      1 capsule,           [facility]

[facility]



     300 MG Oral      3-30                               twice a           l



     Capsule      13:53:                               day, 0           [place]



               00                                 Refill(s)           

 

     metoprolol      0      Yes                      1 tablet,           [facility]

[facility]



     tartrate 50      3-30                               Twice a           l



     mg oral      13:53:                               day, 0           [place]



     tablet      00                                 Refill(s)           

 

     midodrine 5            Yes                      1 tablet,           [facility]

[facility]



     mg oral      3-30                               twice a           l



     tablet      13:53:                               day, 0           [place]



               00                                 Refill(s)           

 

     allopurinol      -0      Yes                      1/2            [facility]

[facility]



     300 mg oral      3-30                               tablet,           l



     tablet      13:53:                               once a           [place]



               00                                 day, 0           



                                                  Refill(s)           

 

     carvedilol      -0      Yes                      1 tablet,           [facility]

[facility]



     3.125 mg      3-30                               twice a           l



     oral tablet      13:53:                               day, 0           [facility]

[facility]



               00                                 Refill(s)           

 

     Digoxin      -0      Yes                      1 tablet,           [facility]

[facility]



     0.125 MG      3-30                               once a           l



     Oral Tablet      13:53:                               day, 0           [facility]

[facility]



               00                                 Refill(s)           

 

     DULoxetine      -0      Yes                      1 capsule,           [facility]

[facility]



     60 mg oral      3-30                               once a           l



     delayed      13:53:                               day, 0           [facility]



     release      00                                 Refill(s)           



     capsule                                                        

 

     apixaban 5            Yes                      1 tablet,           [facility]

[facility]



     MG Oral      3-30                               twice a           l



     Tablet      13:53:                               day, 0           [facility]



     [Eliquis]      00                                 Refill(s)           

 

     gabapentin      -0      Yes                      1 capsule,           [facility]

[facility]



     300 MG Oral      3-30                               twice a           l



     Capsule      13:53:                               day, 0           [facility]



               00                                 Refill(s)           

 

     metoprolol      0      Yes                      1 tablet,           [facility]

[facility]



     tartrate 50      3-30                               Twice a           l



     mg oral      13:53:                               day, 0           [place]



     tablet      00                                 Refill(s)           

 

     midodrine 5      0      Yes                      1 tablet,           [facility]

[facility]



     mg oral      3-30                               twice a           l



     tablet      13:53:                               day, 0           [facility]



               00                                 Refill(s)           

 

     allopurinol      -0      Yes                      1/2            [facility]

[facility]



     300 mg oral      3-30                               tablet,           l



     tablet      13:53:                               once a           [place]



               00                                 day, 0           



                                                  Refill(s)           

 

     carvedilol      -0      Yes                      1 tablet,           [facility]

[facility]



     3.125 mg      3-30                               twice a           l



     oral tablet      13:53:                               day, 0           [facility]

[facility]



               00                                 Refill(s)           

 

     Digoxin      -0      Yes                      1 tablet,           [facility]

[facility]



     0.125 MG      3-30                               once a           l



     Oral Tablet      13:53:                               day, 0           [facility]

[facility]



               00                                 Refill(s)           

 

     DULoxetine      -0      Yes                      1 capsule,           [facility]

[facility]



     60 mg oral      3-30                               once a           l



     delayed      13:53:                               day, 0           [facility]



     release      00                                 Refill(s)           



     capsule                                                        

 

     apixaban 5      0      Yes                      1 tablet,           [facility]

[facility]



     MG Oral      3-30                               twice a           l



     Tablet      13:53:                               day, 0           [place]



     [Eliquis]      00                                 Refill(s)           

 

     gabapentin      0      Yes                      1 capsule,           [facility]

[facility]



     300 MG Oral      3-30                               twice a           l



     Capsule      13:53:                               day, 0           [place]



               00                                 Refill(s)           

 

     metoprolol      0      Yes                      1 tablet,           [facility]

[facility]



     tartrate 50      3-30                               Twice a           l



     mg oral      13:53:                               day, 0           [place]



     tablet      00                                 Refill(s)           

 

     midodrine 5            Yes                      1 tablet,           [facility]

[facility]



     mg oral      3-30                               twice a           l



     tablet      13:53:                               day, 0           [place]



               00                                 Refill(s)           

 

     allopurinol            Yes                      1/2            [facility]

[facility]



     300 mg oral      3-30                               tablet,           l



     tablet      13:53:                               once a           [place]



               00                                 day, 0           



                                                  Refill(s)           

 

     carvedilol            Yes                      1 tablet,           [facility]

[facility]



     3.125 mg      3-30                               twice a           l



     oral tablet      13:53:                               day, 0           [facility]

[facility]



               00                                 Refill(s)           

 

     Digoxin            Yes                      1 tablet,           [facility]

[facility]



     0.125 MG      3-30                               once a           l



     Oral Tablet      13:53:                               day, 0           [facility]

[facility]



               00                                 Refill(s)           

 

     DULoxetine            Yes                      1 capsule,           [facility]

[facility]



     60 mg oral      3-30                               once a           l



     delayed      13:53:                               day, 0           [facility]



     release      00                                 Refill(s)           



     capsule                                                        

 

     apixaban 5            Yes                      1 tablet,           [facility]

[facility]



     MG Oral      3-30                               twice a           l



     Tablet      13:53:                               day, 0           [facility]



     [Eliquis]      00                                 Refill(s)           

 

     gabapentin      0      Yes                      1 capsule,           [facility]

[facility]



     300 MG Oral      3-30                               twice a           l



     Capsule      13:53:                               day, 0           [place]



               00                                 Refill(s)           

 

     metoprolol      0      Yes                      1 tablet,           [facility]

[facility]



     tartrate 50      3-30                               Twice a           l



     mg oral      13:53:                               day, 0           [facility]



     tablet      00                                 Refill(s)           

 

     midodrine 5      0      Yes                      1 tablet,           [facility]

[facility]



     mg oral      3-30                               twice a           l



     tablet      13:53:                               day, 0           [place]



               00                                 Refill(s)           

 

     allopurinol      2021-0      Yes                      1/2            [facility]

[facility]



     300 mg oral      3-30                               tablet,           l



     tablet      13:53:                               once a           [place]



               00                                 day, 0           



                                                  Refill(s)           

 

     carvedilol            Yes                      1 tablet,           [facility]

[facility]



     3.125 mg      3-30                               twice a           l



     oral tablet      13:53:                               day, 0           [facility]

[facility]



               00                                 Refill(s)           

 

     Digoxin            Yes                      1 tablet,           [facility]

[facility]



     0.125 MG      3-30                               once a           l



     Oral Tablet      13:53:                               day, 0           [facility]

[facility]



               00                                 Refill(s)           

 

     DULoxetine            Yes                      1 capsule,           [facility]

[facility]



     60 mg oral      3-30                               once a           l



     delayed      13:53:                               day, 0           [place]



     release      00                                 Refill(s)           



     capsule                                                        

 

     apixaban 5            Yes                      1 tablet,           [facility]

[facility]



     MG Oral      3-30                               twice a           l



     Tablet      13:53:                               day, 0           [facility]



     [Eliquis]      00                                 Refill(s)           

 

     gabapentin            Yes                      1 capsule,           [facility]

[facility]



     300 MG Oral      3-30                               twice a           l



     Capsule      13:53:                               day, 0           [place]



               00                                 Refill(s)           

 

     metoprolol            Yes                      1 tablet,           [facility]

[facility]



     tartrate 50      3-30                               Twice a           l



     mg oral      13:53:                               day, 0           [place]



     tablet      00                                 Refill(s)           

 

     midodrine 5            Yes                      1 tablet,           [facility]

[facility]



     mg oral      3-30                               twice a           l



     tablet      13:53:                               day, 0           [facility]



               00                                 Refill(s)           

 

     DULoxetine      2020      Yes                      60 mg = 1           [facility]

[facility]



     60 mg oral      1-25                               cap, PO,           l



     delayed      17:17:                               Daily, #           [facility]

n



     release      00                                 30 cap, 0           



     capsule                                         Refill(s)           

 

     Spironolact      2020      Yes                      25 mg = 1           [facility]

[facility]



     one 25 MG      1-25                               tab, PO,           l



     Oral Tablet      17:17:                               Daily, 0           [facility]

[facility]



     [Aldactone]      00                                 Refill(s)           

 

     DULoxetine      2020      Yes                      60 mg = 1           [facility]

[facility]



     60 mg oral      1-25                               cap, PO,           l



     delayed      17:17:                               Daily, #           [facility]

n



     release      00                                 30 cap, 0           



     capsule                                         Refill(s)           

 

     Spironolact      2020      Yes                      25 mg = 1           [facility]

[facility]



     one 25 MG      1-25                               tab, PO,           l



     Oral Tablet      17:17:                               Daily, 0           [facility]

[facility]



     [Aldactone]      00                                 Refill(s)           

 

     DULoxetine      2020      Yes                      60 mg = 1           Me

moria



     60 mg oral      1-25                               cap, PO,           l



     delayed      17:17:                               Daily, #           Donny

n



     release      00                                 30 cap, 0           



     capsule                                         Refill(s)           

 

     Spironolact      2020      Yes                      25 mg = 1           M

emoria



     one 25 MG      1-25                               tab, PO,           l



     Oral Tablet      17:17:                               Daily, 0           He

rmann



     [Aldactone]      00                                 Refill(s)           

 

     DULoxetine      2020      Yes                      60 mg = 1           Me

moria



     60 mg oral      1-25                               cap, PO,           l



     delayed      17:17:                               Daily, #           Donny

n



     release      00                                 30 cap, 0           



     capsule                                         Refill(s)           

 

     Spironolact      2020      Yes                      25 mg = 1           M

emoria



     one 25 MG      1-25                               tab, PO,           l



     Oral Tablet      17:17:                               Daily, 0           He

rmann



     [Aldactone]      00                                 Refill(s)           

 

     DULoxetine      2020      Yes                      60 mg = 1           Me

moria



     60 mg oral      1-25                               cap, PO,           l



     delayed      17:17:                               Daily, #           Donny

n



     release      00                                 30 cap, 0           



     capsule                                         Refill(s)           

 

     Spironolact      2020      Yes                      25 mg = 1           M

emoria



     one 25 MG      1-25                               tab, PO,           l



     Oral Tablet      17:17:                               Daily, 0           He

rmann



     [Aldactone]      00                                 Refill(s)           

 

     DULoxetine      2020      Yes                      60 mg = 1           Me

moria



     60 mg oral      1-25                               cap, PO,           l



     delayed      17:17:                               Daily, #           Donny

n



     release      00                                 30 cap, 0           



     capsule                                         Refill(s)           

 

     Spironolact      2020      Yes                      25 mg = 1           M

emoria



     one 25 MG      1-25                               tab, PO,           l



     Oral Tablet      17:17:                               Daily, 0           He

rmann



     [Aldactone]      00                                 Refill(s)           

 

     DULoxetine      2020      Yes                      60 mg = 1           Me

moria



     60 mg oral      1-25                               cap, PO,           l



     delayed      17:17:                               Daily, #           Donny

n



     release      00                                 30 cap, 0           



     capsule                                         Refill(s)           

 

     Spironolact      2020      Yes                      25 mg = 1           M

emoria



     one 25 MG      1-25                               tab, PO,           l



     Oral Tablet      17:17:                               Daily, 0           He

rmann



     [Aldactone]      00                                 Refill(s)           

 

     Digoxin      2020-1      Yes                      0.125 mg,           [facility]

ia



     0.125 MG      1-25                               PO, Daily,           l



     Oral Tablet      17:13:                               # 30 tab,           H

ermann



               00                                 0              



                                                  Refill(s)           

 

     Digoxin      2020-1      Yes                      0.125 mg,           [facility]

ia



     0.125 MG      1-25                               PO, Daily,           l



     Oral Tablet      17:13:                               # 30 tab,           H

ermann



               00                                 0              



                                                  Refill(s)           

 

     Digoxin      2020-1      Yes                      0.125 mg,           [facility]

ia



     0.125 MG      1-25                               PO, Daily,           l



     Oral Tablet      17:13:                               # 30 tab,           H

ermann



               00                                 0              



                                                  Refill(s)           

 

     Digoxin      2020-1      Yes                      0.125 mg,           [facility]

ia



     0.125 MG      1-25                               PO, Daily,           l



     Oral Tablet      17:13:                               # 30 tab,           H

ermann



               00                                 0              



                                                  Refill(s)           

 

     Digoxin      2020-1      Yes                      0.125 mg,           [facility]

ia



     0.125 MG      1-25                               PO, Daily,           l



     Oral Tablet      17:13:                               # 30 tab,           H

ermann



               00                                 0              



                                                  Refill(s)           

 

     Digoxin      -1      Yes                      0.125 mg,           [facility]

ia



     0.125 MG      1-25                               PO, Daily,           l



     Oral Tablet      17:13:                               # 30 tab,           H

ermann



               00                                 0              



                                                  Refill(s)           

 

     Digoxin      2020-1      Yes                      0.125 mg,           [facility]

ia



     0.125 MG      1-25                               PO, Daily,           l



     Oral Tablet      17:13:                               # 30 tab,           H

ermann



               00                                 0              



                                                  Refill(s)           

 

     Mupirocin      -      Yes                      See            [facility]



     0.02 MG/MG      0-13                               Instructio           l



     Topical      15:44:                               ns, APPLY           [facility]

nn



     Ointment      00                                 EXTERNALLY           



                                                  TO THE           



                                                  AFFECTED           



                                                  AREA TWICE           



                                                  DAILY, #           



                                                  66 gm, 1           



                                                  Refill(s),           



                                                  Pharmacy:           



                                                  [facility]           



                                                  #[id number],           



                                                  170.18,           



                                                  cm,            



                                                  20           



                                                  14:42:00           



                                                  CST,           



                                                  Height,           



                                                  164.318,           



                                                  kg,            



                                                  20           



                                                  14:42:00           



                                                  CST,           



                                                  Weight           

 

     Mupirocin      -      Yes                      See            [facility]



     0.02 MG/MG      0-13                               Instructio           l



     Topical      15:44:                               ns, APPLY           [facility]

nn



     Ointment      00                                 EXTERNALLY           



                                                  TO THE           



                                                  AFFECTED           



                                                  AREA TWICE           



                                                  DAILY, #           



                                                  66 gm, 1           



                                                  Refill(s),           



                                                  Pharmacy:           



                                                  [facility]           



                                                  #[id number],           



                                                  170.18,           



                                                  cm,            



                                                  20           



                                                  14:42:00           



                                                  CST,           



                                                  Height,           



                                                  164.318,           



                                                  kg,            



                                                  20           



                                                  14:42:00           



                                                  CST,           



                                                  Weight           

 

     Mupirocin      2020-      Yes                      See            [facility]



     0.02 MG/MG      0-13                               Instructio           l



     Topical      15:44:                               ns, APPLY           [facility]

nn



     Ointment      00                                 EXTERNALLY           



                                                  TO THE           



                                                  AFFECTED           



                                                  AREA TWICE           



                                                  DAILY, #           



                                                  66 gm, 1           



                                                  Refill(s),           



                                                  Pharmacy:           



                                                  [facility]           



                                                  #[id number],           



                                                  170.18,           



                                                  cm,            



                                                  20           



                                                  14:42:00           



                                                  CST,           



                                                  Height,           



                                                  164.318,           



                                                  kg,            



                                                  20           



                                                  14:42:00           



                                                  CST,           



                                                  Weight           

 

     Mupirocin      2020-      Yes                      See            [facility]



     0.02 MG/MG      0-13                               Instructio           l



     Topical      15:44:                               ns, APPLY           [facility]

nn



     Ointment      00                                 EXTERNALLY           



                                                  TO THE           



                                                  AFFECTED           



                                                  AREA TWICE           



                                                  DAILY, #           



                                                  66 gm, 1           



                                                  Refill(s),           



                                                  Pharmacy:           



                                                  [facility]           



                                                  #[id number],           



                                                  170.18,           



                                                  cm,            



                                                  20           



                                                  14:42:00           



                                                  CST,           



                                                  Height,           



                                                  164.318,           



                                                  kg,            



                                                  20           



                                                  14:42:00           



                                                  CST,           



                                                  Weight           

 

     Mupirocin      -      Yes                      See            [facility]



     0.02 MG/MG      0-13                               Instructio           l



     Topical      15:44:                               ns, APPLY           [facility]

nn



     Ointment      00                                 EXTERNALLY           



                                                  TO THE           



                                                  AFFECTED           



                                                  AREA TWICE           



                                                  DAILY, #           



                                                  66 gm, 1           



                                                  Refill(s),           



                                                  Pharmacy:           



                                                  [facility]           



                                                  #[id number],           



                                                  170.18,           



                                                  cm,            



                                                  20           



                                                  14:42:00           



                                                  CST,           



                                                  Height,           



                                                  164.318,           



                                                  kg,            



                                                  20           



                                                  14:42:00           



                                                  CST,           



                                                  Weight           

 

     Mupirocin      -      Yes                      See            [facility]



     0.02 MG/MG      0-13                               Instructio           l



     Topical      15:44:                               ns, APPLY           [facility]

nn



     Ointment      00                                 EXTERNALLY           



                                                  TO THE           



                                                  AFFECTED           



                                                  AREA TWICE           



                                                  DAILY, #           



                                                  66 gm, 1           



                                                  Refill(s),           



                                                  Pharmacy:           



                                                  [facility]           



                                                  #[id number],           



                                                  170.18,           



                                                  cm,            



                                                  20           



                                                  14:42:00           



                                                  CST,           



                                                  Height,           



                                                  164.318,           



                                                  kg,            



                                                  20           



                                                  14:42:00           



                                                  CST,           



                                                  Weight           

 

     Mupirocin      2020-      Yes                      See            [facility]



     0.02 MG/MG      0-13                               Instructio           l



     Topical      15:44:                               ns, APPLY           [facility]

nn



     Ointment      00                                 EXTERNALLY           



                                                  TO THE           



                                                  AFFECTED           



                                                  AREA TWICE           



                                                  DAILY, #           



                                                  66 gm, 1           



                                                  Refill(s),           



                                                  Pharmacy:           



                                                  [facility]           



                                                  #[id number],           



                                                  170.18,           



                                                  cm,            



                                                  20           



                                                  14:42:00           



                                                  CST,           



                                                  Height,           



                                                  164.318,           



                                                  kg,            



                                                  20           



                                                  14:42:00           



                                                  CST,           



                                                  Weight           

 

     Nitrofurant      2020-0      Yes                      100 mg = 1           

[facility]



     oin 100 MG      8-09                               cap, PO,           l



     Oral      17:57:                               BID, X 10           [place]



     Capsule      00                                 day, # 20           



     [Macrobid]                                         cap, 0           



                                                  Refill(s),           



                                                  Pharmacy:           



                                                  [facility]           



                                                  #[id number],           



                                                  170.18,           



                                                  cm,            



                                                  20           



                                                  14:42:00           



                                                  CST,           



                                                  Height,           



                                                  164.318,           



                                                  kg,            



                                                  20           



                                                  14:42:00           



                                                  CST,           



                                                  Weight           

 

     Nitrofurant      2020-0      Yes                      100 mg = 1           

[facility]



     oin 100 MG      8-09                               cap, PO,           l



     Oral      17:57:                               BID, X 10           [place]



     Capsule      00                                 day, # 20           



     [Macrobid]                                         cap, 0           



                                                  Refill(s),           



                                                  Pharmacy:           



                                                  [facility]           



                                                  #[id number],           



                                                  170.18,           



                                                  cm,            



                                                  20           



                                                  14:42:00           



                                                  CST,           



                                                  Height,           



                                                  164.318,           



                                                  kg,            



                                                  20           



                                                  14:42:00           



                                                  CST,           



                                                  Weight           

 

     Nitrofurant      2020-0      Yes                      100 mg = 1           

[facility]



     oin 100 MG      8-09                               cap, PO,           l



     Oral      17:57:                               BID, X 10           [place]



     Capsule      00                                 day, # 20           



     [Macrobid]                                         cap, 0           



                                                  Refill(s),           



                                                  Pharmacy:           



                                                  [facility]           



                                                  #[id number],           



                                                  170.18,           



                                                  cm,            



                                                  20           



                                                  14:42:00           



                                                  CST,           



                                                  Height,           



                                                  164.318,           



                                                  kg,            



                                                  20           



                                                  14:42:00           



                                                  CST,           



                                                  Weight           

 

     Nitrofurant      2020-0      Yes                      100 mg = 1           

[facility]



     oin 100 MG      8-09                               cap, PO,           l



     Oral      17:57:                               BID, X 10           [place]



     Capsule      00                                 day, # 20           



     [Macrobid]                                         cap, 0           



                                                  Refill(s),           



                                                  Pharmacy:           



                                                  [facility]           



                                                  #[id number],           



                                                  170.18,           



                                                  cm,            



                                                  20           



                                                  14:42:00           



                                                  CST,           



                                                  Height,           



                                                  164.318,           



                                                  kg,            



                                                  20           



                                                  14:42:00           



                                                  CST,           



                                                  Weight           

 

     Nitrofurant      2020-0      Yes                      100 mg = 1           

[facility]



     oin 100 MG      8-09                               cap, PO,           l



     Oral      17:57:                               BID, X 10           [place]



     Capsule      00                                 day, # 20           



     [Macrobid]                                         cap, 0           



                                                  Refill(s),           



                                                  Pharmacy:           



                                                  [facility]           



                                                  [facility]           



                                                  #[id number],           



                                                  170.18,           



                                                  cm,            



                                                  20           



                                                  14:42:00           



                                                  CST,           



                                                  Height,           



                                                  164.318,           



                                                  kg,            



                                                  20           



                                                  14:42:00           



                                                  CST,           



                                                  Weight           

 

     Nitrofurant      2020-0      Yes                      100 mg = 1           

[facility]



     oin 100 MG      8-09                               cap, PO,           l



     Oral      17:57:                               BID, X 10           [place]



     Capsule      00                                 day, # 20           



     [Macrobid]                                         cap, 0           



                                                  Refill(s),           



                                                  Pharmacy:           



                                                  [facility]           



                                                  #[id number],           



                                                  170.18,           



                                                  cm,            



                                                  20           



                                                  14:42:00           



                                                  CST,           



                                                  Height,           



                                                  164.318,           



                                                  kg,            



                                                  20           



                                                  14:42:00           



                                                  CST,           



                                                  Weight           

 

     Nitrofurant      2020-0      Yes                      100 mg = 1           

[facility]



     oin 100 MG      8-09                               cap, PO,           l



     Oral      17:57:                               BID, X 10           [place]



     Capsule                                       day, # 20           



     [Macrobid]                                         cap, 0           



                                                  Refill(s),           



                                                  Pharmacy:           



                                                  [facility]           



                                                  #[id number],           



                                                  170.18,           



                                                  cm,            



                                                  20           



                                                  14:42:00           



                                                  CST,           



                                                  Height,           



                                                  164.318,           



                                                  kg,            



                                                  20           



                                                  14:42:00           



                                                  CST,           



                                                  Weight           

 

     lisinopril      2020-0      Yes                      2.5 mg = 1           [facility]

[facility]



     2.5 mg oral      6-04                               tab, PO,           l



     tablet      23:26:                               Daily, #           [place]



               00                                 30 tab, 2           



                                                  Refill(s),           



                                                  called to           



                                                  pharmacy           

 

     lisinopril      2020-0      Yes                      2.5 mg = 1           [facility]

[facility]



     2.5 mg oral      6-04                               tab, PO,           l



     tablet      23:26:                               Daily, #           [place]



               00                                 30 tab, 2           



                                                  Refill(s),           



                                                  called to           



                                                  pharmacy           

 

     lisinopril      2020-0      Yes                      2.5 mg = 1           [facility]

[facility]



     2.5 mg oral      6-04                               tab, PO,           l



     tablet      23:26:                               Daily, #           [place]



               00                                 30 tab, 2           



                                                  Refill(s),           



                                                  called to           



                                                  pharmacy           

 

     lisinopril      2020-0      Yes                      2.5 mg = 1           [facility]

[facility]



     2.5 mg oral      6-04                               tab, PO,           l



     tablet      23:26:                               Daily, #           [place]



               00                                 30 tab, 2           



                                                  Refill(s),           



                                                  called to           



                                                  pharmacy           

 

     lisinopril      2020-0      Yes                      2.5 mg = 1           [facility]

[facility]



     2.5 mg oral      6-04                               tab, PO,           l



     tablet      23:26:                               Daily, #           [place]



               00                                 30 tab, 2           



                                                  Refill(s),           



                                                  called to           



                                                  pharmacy           

 

     lisinopril      2020-0      Yes                      2.5 mg = 1           [facility]

[facility]



     2.5 mg oral      6-04                               tab, PO,           l



     tablet      23:26:                               Daily, #           [place]



               00                                 30 tab, 2           



                                                  Refill(s),           



                                                  called to           



                                                  pharmacy           

 

     lisinopril      2020-0      Yes                      2.5 mg = 1           [facility]

[facility]



     2.5 mg oral      6-04                               tab, PO,           l



     tablet      23:26:                               Daily, #           [place]



               00                                 30 tab, 2           



                                                  Refill(s),           



                                                  called to           



                                                  pharmacy           

 

     calcitriol      2020-0      Yes                      = 1 cap,           [facility]

[facility]



     0.25 mcg      5-20                               PO, Daily,           l



     oral      12:18:                               # 90 cap,           [place]



     capsule      00                                 1              



                                                  Refill(s),           



                                                  Pharmacy:           



                                                  [facility]           



                                                  #[id number]           

 

     calcitriol      2020-0      Yes                      = 1 cap,           [facility]

[facility]



     0.25 mcg      5-20                               PO, Daily,           l



     oral      12:18:                               # 90 cap,           [place]



     capsule      00                                 1              



                                                  Refill(s),           



                                                  Pharmacy:           



                                                  [facility]           



                                                  [facility]           



                                                  #[id number]           

 

     calcitriol      2020-0      Yes                      = 1 cap,           [facility]

[facility]



     0.25 mcg      5-20                               PO, Daily,           l



     oral      12:18:                               # 90 cap,           [place]



     capsule      00                                 1              



                                                  Refill(s),           



                                                  Pharmacy:           



                                                  [facility]           



                                                  #[id number]           

 

     calcitriol      2020-0      Yes                      = 1 cap,           [facility]

[facility]



     0.25 mcg      5-20                               PO, Daily,           l



     oral      12:18:                               # 90 cap,           [place]



     capsule      00                                 1              



                                                  Refill(s),           



                                                  Pharmacy:           



                                                  [facility]           



                                                  #[id number]           

 

     calcitriol      2020-0      Yes                      = 1 cap,           [facility]

[facility]



     0.25 mcg      5-20                               PO, Daily,           l



     oral      12:18:                               # 90 cap,           [place]



     capsule      00                                 1              



                                                  Refill(s),           



                                                  Pharmacy:           



                                                  [facility]           



                                                  #[id number]           

 

     calcitriol      2020-0      Yes                      = 1 cap,           [facility]

[facility]



     0.25 mcg      5-20                               PO, Daily,           l



     oral      12:18:                               # 90 cap,           [place]



     capsule      00                                 1              



                                                  Refill(s),           



                                                  Pharmacy:           



                                                  [facility]           



                                                  [facility]           



                                                  #[id number]           

 

     calcitriol      2020-0      Yes                      = 1 cap,           [facility]

[facility]



     0.25 mcg      5-20                               PO, Daily,           l



     oral      12:18:                               # 90 cap,           [place]



     capsule      00                                 1              



                                                  Refill(s),           



                                                  Pharmacy:           



                                                  [facility]           



                                                  [facility]           



                                                  #[id number]           

 

     calcitriol      2020-0      Yes                      = 1 cap,           [facility]

[facility]



     0.25 mcg      4-16                               PO, Daily,           l



     oral      16:37:                               # 90           [place]



     capsule      47                                 unknown           



                                                  unit,           



                                                  Pharmacy:           



                                                  [facility]           



                                                  [facility]           



                                                  #[id number]           

 

     calcitriol      2020-0      Yes                      = 1 cap,           [facility]

[facility]



     0.25 mcg      4-16                               PO, Daily,           l



     oral      16:37:                               # 90           [place]



     capsule      47                                 unknown           



                                                  unit,           



                                                  Pharmacy:           



                                                  [facility]           



                                                  [facility]           



                                                  #[id number]           

 

     calcitriol      2020-0      Yes                      = 1 cap,           [facility]

[facility]



     0.25 mcg      4-16                               PO, Daily,           l



     oral      16:37:                               # 90           [place]



     capsule      47                                 unknown           



                                                  unit,           



                                                  Pharmacy:           



                                                  [facility]           



                                                  #[id number]           

 

     calcitriol      2020-0      Yes                      = 1 cap,           [facility]

[facility]



     0.25 mcg      4-16                               PO, Daily,           l



     oral      16:37:                               # 90           [place]



     capsule      47                                 unknown           



                                                  unit,           



                                                  Pharmacy:           



                                                  [facility]           



                                                  [facility]           



                                                  #[id number]           

 

     calcitriol      2020-0      Yes                      = 1 cap,           [facility]

[facility]



     0.25 mcg      4-16                               PO, Daily,           l



     oral      16:37:                               # 90           [place]



     capsule      47                                 unknown           



                                                  unit,           



                                                  Pharmacy:           



                                                  [facility]           



                                                  #[id number]           

 

     calcitriol      2020-0      Yes                      = 1 cap,           [facility]

[facility]



     0.25 mcg      4-16                               PO, Daily,           l



     oral      16:37:                               # 90           [place]



     capsule      47                                 unknown           



                                                  unit,           



                                                  Pharmacy:           



                                                  [facility]           



                                                  [facility]           



                                                  #[id number]           

 

     calcitriol      2020-0      Yes                      = 1 cap,           [facility]

[facility]



     0.25 mcg      4-16                               PO, Daily,           l



     oral      16:37:                               # 90           [place]



     capsule      47                                 unknown           



                                                  unit,           



                                                  Pharmacy:           



                                                  [facility]           



                                                  [facility]           



                                                  #[id number]           

 

     Furosemide      2020-0      Yes                      = 1 tab,           [facility]

[facility]



     40 MG Oral      4-13                               PO, Every           l



     Tablet      20:06:                               Other Day,           [place]

nn



               19                                 # 45 tab,           



                                                  Pharmacy:           



                                                  [facility]           



                                                  [facility]           



                                                  #[id number]           

 

     Furosemide      2020-0      Yes                      = 1 tab,           [facility]

[facility]



     40 MG Oral      4-13                               PO, Every           l



     Tablet      20:06:                               Other Day,           [facility]



               19                                 # 45 tab,           



                                                  Pharmacy:           



                                                  [facility]           



                                                  [facility]           



                                                  #[id number]           

 

     Furosemide      2020-0      Yes                      = 1 tab,           [facility]

[facility]



     40 MG Oral      4-13                               PO, Every           l



     Tablet      20:06:                               Other Day,           [facility]



               19                                 # 45 tab,           



                                                  Pharmacy:           



                                                  [facility]           



                                                  #[id number]           

 

     Furosemide      2020-0      Yes                      = 1 tab,           [facility]

[facility]



     40 MG Oral      4-13                               PO, Every           l



     Tablet      20:06:                               Other Day,           [facility]



               19                                 # 45 tab,           



                                                  Pharmacy:           



                                                  [facility]           



                                                  [facility]           



                                                  #[id number]           

 

     Furosemide      2020-0      Yes                      = 1 tab,           [facility]

[facility]



     40 MG Oral      4-13                               PO, Every           l



     Tablet      20:06:                               Other Day,           [facility]



               19                                 # 45 tab,           



                                                  Pharmacy:           



                                                  [facility]           



                                                  [facility]           



                                                  #[id number]           

 

     Furosemide      2020-0      Yes                      = 1 tab,           [facility]

[facility]



     40 MG Oral      4-13                               PO, Every           l



     Tablet      20:06:                               Other Day,           [facility]



               19                                 # 45 tab,           



                                                  Pharmacy:           



                                                  [facility]           



                                                  [facility]           



                                                  #[id number]           

 

     Furosemide      2020-0      Yes                      = 1 tab,           [facility]

[facility]



     40 MG Oral      4-13                               PO, Every           l



     Tablet      20:06:                               Other Day,           [facility]

nn



               19                                 # 45 tab,           



                                                  Pharmacy:           



                                                  [facility]           



                                                  [facility]           



                                                  #[id number]           

 

     prednisolon      2020-0      Yes                      1 drop in           M

[facility]



     e acetate      4-10                               each eye,           l



     10 MG/ML      20:53:                               once           [facility]



     Ophthalmic      00                                 daily, 0           



     Suspension                                         Refill(s)           

 

     bromfenac      2020-0      Yes                      1 drop in           Mem

[facility]



     0.7 MG/ML      4-10                               right eye,           l



     Ophthalmic      20:53:                               once           [place]



     Solution      00                                 daily, 0           



     [Prolensa]                                         Refill(s)           

 

     prednisolon      2020-0      Yes                      1 drop in           M

[facility]



     e acetate      4-10                               each eye,           l



     10 MG/ML      20:53:                               once           [place]



     Ophthalmic      00                                 daily, 0           



     Suspension                                         Refill(s)           

 

     bromfenac      2020-0      Yes                      1 drop in           [facility]

[facility]



     0.7 MG/ML      4-10                               right eye,           l



     Ophthalmic      20:53:                               once           [place]



     Solution      00                                 daily, 0           



     [Prolensa]                                         Refill(s)           

 

     prednisolon      2020-0      Yes                      1 drop in           M

[facility]



     e acetate      4-10                               each eye,           l



     10 MG/ML      20:53:                               once           [place]



     Ophthalmic      00                                 daily, 0           



     Suspension                                         Refill(s)           

 

     bromfenac      2020-0      Yes                      1 drop in           [facility]

[facility]



     0.7 MG/ML      4-10                               right eye,           l



     Ophthalmic      20:53:                               once           [place]



     Solution      00                                 daily, 0           



     [Prolensa]                                         Refill(s)           

 

     prednisolon      2020-0      Yes                      1 drop in           M

[facility]



     e acetate      4-10                               each eye,           l



     10 MG/ML      20:53:                               once           [place]



     Ophthalmic      00                                 daily, 0           



     Suspension                                         Refill(s)           

 

     bromfenac      2020-0      Yes                      1 drop in           [facility]

[facility]



     0.7 MG/ML      4-10                               right eye,           l



     Ophthalmic      20:53:                               once           [place]



     Solution      00                                 daily, 0           



     [Prolensa]                                         Refill(s)           

 

     prednisolon      2020-0      Yes                      1 drop in           M

[facility]



     e acetate      4-10                               each eye,           l



     10 MG/ML      20:53:                               once           [place]



     Ophthalmic      00                                 daily, 0           



     Suspension                                         Refill(s)           

 

     bromfenac      2020-0      Yes                      1 drop in           [facility]

[facility]



     0.7 MG/ML      4-10                               right eye,           l



     Ophthalmic      20:53:                               once           [place]



     Solution      00                                 daily, 0           



     [Prolensa]                                         Refill(s)           

 

     prednisolon      2020-0      Yes                      1 drop in           M

[facility]



     e acetate      4-10                               each eye,           l



     10 MG/ML      20:53:                               once           [place]



     Ophthalmic      00                                 daily, 0           



     Suspension                                         Refill(s)           

 

     bromfenac      2020-0      Yes                      1 drop in           [facility]

[facility]



     0.7 MG/ML      4-10                               right eye,           l



     Ophthalmic      20:53:                               once           [place]



     Solution      00                                 daily, 0           



     [Prolensa]                                         Refill(s)           

 

     prednisolon      2020-0      Yes                      1 drop in           M

[facility]



     e acetate      4-10                               each eye,           l



     10 MG/ML      20:53:                               once           [place]



     Ophthalmic      00                                 daily, 0           



     Suspension                                         Refill(s)           

 

     bromfenac      2020-0      Yes                      1 drop in           [facility]

[facility]



     0.7 MG/ML      4-10                               right eye,           l



     Ophthalmic      20:53:                               once           [place]



     Solution      00                                 daily, 0           



     [Prolensa]                                         Refill(s)           

 

     Furosemide      2020-0      Yes                      = 1 tab,           [facility]

[facility]



     40 MG Oral      1-23                               PO, Every           l



     Tablet      15:13:                               Other Day,           [place]

nn



               34                                 # 45 tab,           



                                                  Pharmacy:           



                                                  [facility]           



                                                  [facility]           



                                                  #[id number]           

 

     Furosemide      2020-0      Yes                      = 1 tab,           [facility]

[facility]



     40 MG Oral      1-23                               PO, Every           l



     Tablet      15:13:                               Other Day,           [place]

nn



               34                                 # 45 tab,           



                                                  Pharmacy:           



                                                  [facility]           



                                                  #[id number]           

 

     Furosemide      2020-0      Yes                      = 1 tab,           [facility]

[facility]



     40 MG Oral      1-23                               PO, Every           l



     Tablet      15:13:                               Other Day,           [place]

nn



               34                                 # 45 tab,           



                                                  Pharmacy:           



                                                  [facility]           



                                                  #[id number]           

 

     Furosemide      2020-0      Yes                      = 1 tab,           [facility]

[facility]



     40 MG Oral      1-23                               PO, Every           l



     Tablet      15:13:                               Other Day,           [facility]

nn



               34                                 # 45 tab,           



                                                  Pharmacy:           



                                                  [facility]           



                                                  #[id number]           

 

     Furosemide      2020-0      Yes                      = 1 tab,           [facility]

[facility]



     40 MG Oral      1-23                               PO, Every           l



     Tablet      15:13:                               Other Day,           [facility]

nn



               34                                 # 45 tab,           



                                                  Pharmacy:           



                                                  [facility]           



                                                  #[id number]           

 

     Furosemide      2020-0      Yes                      = 1 tab,           [facility]

[facility]



     40 MG Oral      1-23                               PO, Every           l



     Tablet      15:13:                               Other Day,           [facility]

nn



               34                                 # 45 tab,           



                                                  Pharmacy:           



                                                  [facility]           



                                                  #[id number]           

 

     Furosemide      2020-0      Yes                      = 1 tab,           [facility]

[facility]



     40 MG Oral      1-23                               PO, Every           l



     Tablet      15:13:                               Other Day,           [facility]

nn



               34                                 # 45 tab,           



                                                  Pharmacy:           



                                                  [facility]           



                                                  [facility] [place]           



                                                  [address] [place], [place] [address]rocin      2019      Yes                      See            [facility]



     0.02 MG/MG      2-27                               Instructio           l



     Topical      19:11:                               ns, # 66           [facility]

n



     Ointment      15                                 gm, APPLY           



                                                  EXTERNALLY           



                                                  TO THE           



                                                  AFFECTED           



                                                  AREA TWICE           



                                                  DAILY,           



                                                  Pharmacy:           



                                                  [facility]           



                                                  [facility] [place]           



                                                  [id number]      [facility]      Yes                      See            [facility]



     0.02 MG/MG      2-27                               Instructio           l



     Topical      19:11:                               ns, # 66           [facility]

n



     Ointment      15                                 gm, APPLY           



                                                  EXTERNALLY           



                                                  TO THE           



                                                  AFFECTED           



                                                  AREA TWICE           



                                                  DAILY,           



                                                  Pharmacy:           



                                                  [facility]           



                                                  [facility]           



                                                  #[id number]           

 

     Mupirocin      2019-      Yes                      See            [facility]



     0.02 MG/MG      2-27                               Instructio           l



     Topical      19:11:                               ns, # 66           [facility]

n



     Ointment      15                                 gm, APPLY           



                                                  EXTERNALLY           



                                                  TO THE           



                                                  AFFECTED           



                                                  AREA TWICE           



                                                  DAILY,           



                                                  Pharmacy:           



                                                  [facility]           



                                                  [facility]           



                                                  #[id number]           

 

     Mupirocin      2019      Yes                      See            [facility]



     0.02 MG/MG      2-27                               Instructio           l



     Topical      19:11:                               ns, # 66           [facility]

n



     Ointment      15                                 gm, APPLY           



                                                  EXTERNALLY           



                                                  TO THE           



                                                  AFFECTED           



                                                  AREA TWICE           



                                                  DAILY,           



                                                  Pharmacy:           



                                                  [facility]           



                                                  [facility]           



                                                  #[id number]           

 

     Mupirocin      2019      Yes                      See            [facility]



     0.02 MG/MG      2-27                               Instructio           l



     Topical      19:11:                               ns, # 66           [facility]

n



     Ointment      15                                 gm, APPLY           



                                                  EXTERNALLY           



                                                  TO THE           



                                                  AFFECTED           



                                                  AREA TWICE           



                                                  DAILY,           



                                                  Pharmacy:           



                                                  [facility]           



                                                  [facility]           



                                                  #[id number]           

 

     Mupirocin      2019      Yes                      See            [facility]



     0.02 MG/MG      2-27                               Instructio           l



     Topical      19:11:                               ns, # 66           [facility]

n



     Ointment      15                                 gm, APPLY           



                                                  EXTERNALLY           



                                                  TO THE           



                                                  AFFECTED           



                                                  AREA TWICE           



                                                  DAILY,           



                                                  Pharmacy:           



                                                  [facility]           



                                                  [facility]           



                                                  #[id number]           

 

     Mupirocin      2019      Yes                      See            [facility]



     0.02 MG/MG      2-27                               Instructio           l



     Topical      19:11:                               ns, # 66           [facility]

n



     Ointment      15                                 gm, APPLY           



                                                  EXTERNALLY           



                                                  TO THE           



                                                  AFFECTED           



                                                  AREA TWICE           



                                                  DAILY,           



                                                  Pharmacy:           



                                                  [facility]           



                                                  [facility] [place]           



                                                  #[facility]           

 

     Nitrofurant      2019      Yes                      100 mg = 1           

[facility]



     oin 100 MG      2-13                               cap, PO,           l



     Oral      01:44:                               BID, X 5           [place]



     Capsule      00                                 day, # 10           



     [Macrodanti                                         cap, 0           



     n]                                           Refill(s),           



                                                  Pharmacy:           



                                                  [facility]           



                                                  [facility]           



                                                  #[id number]           

 

     Nitrofurant      2019      Yes                      100 mg = 1           

[facility]



     oin 100 MG      2-13                               cap, PO,           l



     Oral      01:44:                               BID, X 5           [place]



     Capsule      00                                 day, # 10           



     [Macrodanti                                         cap, 0           



     n]                                           Refill(s),           



                                                  Pharmacy:           



                                                  [facility]           



                                                  [facility]           



                                                  #[id number]           

 

     Nitrofurant      2019      Yes                      100 mg = 1           

[facility]



     oin 100 MG      2-13                               cap, PO,           l



     Oral      01:44:                               BID, X 5           [place]



     Capsule      00                                 day, # 10           



     [Macrodanti                                         cap, 0           



     n]                                           Refill(s),           



                                                  Pharmacy:           



                                                  [facility]           



                                                  [facility]           



                                                  #[id number]           

 

     Nitrofurant      2019      Yes                      100 mg = 1           

[facility]



     oin 100 MG      2-13                               cap, PO,           l



     Oral      01:44:                               BID, X 5           [place]



     Capsule      00                                 day, # 10           



     [Macrodanti                                         cap, 0           



     n]                                           Refill(s),           



                                                  Pharmacy:           



                                                  [facility]           



                                                  [facility]           



                                                  #[id number]           

 

     Nitrofurant      2019      Yes                      100 mg = 1           

[facility]



     oin 100 MG      2-13                               cap, PO,           l



     Oral      01:44:                               BID, X 5           [place]



     Capsule      00                                 day, # 10           



     [Macrodanti                                         cap, 0           



     n]                                           Refill(s),           



                                                  Pharmacy:           



                                                  [facility]           



                                                  [facility]           



                                                  #[id number]           

 

     Nitrofurant      2019      Yes                      100 mg = 1           

[facility]



     oin 100 MG      2-13                               cap, PO,           l



     Oral      01:44:                               BID, X 5           [place]



     Capsule      00                                 day, # 10           



     [Macrodanti                                         cap, 0           



     n]                                           Refill(s),           



                                                  Pharmacy:           



                                                  [facility]           



                                                  [facility]           



                                                  #[id number]           

 

     Nitrofurant      2019      Yes                      100 mg = 1           

[facility]



     oin 100 MG      2-13                               cap, PO,           l



     Oral      01:44:                               BID, X 5           [place]



     Capsule      00                                 day, # 10           



     [Macrodanti                                         cap, 0           



     n]                                           Refill(s),           



                                                  Pharmacy:           



                                                  [facility]           



                                                  [facility]           



                                                  #[id number]           

 

     apixaban 2019      Yes                      5 mg, PO,           [facility]

[facility]



     MG Oral      0-25                               Q12H, 0           l



     Tablet      15:07:                               Refill(s)           [facility] amrtinez



     [Eliquis]      00                                                

 

     metoprolol      2019      Yes                      50 mg = 1           [facility]

[facility]



     tartrate 50      0-25                               tab, PO,           l



     mg oral      15:07:                               BID, # 180           [facility]

[facility]



     tablet      00                                 tab, 0           



                                                  Refill(s)           

 

     Diltiazem      2019      Yes                      180 mg = 1           Me

[facility]



     Hydrochlori      0-25                               cap, PO,           l



     de       15:07:                               Daily, #           [facility]

daryl



     mg/24 hours      00                                 30 cap, 0           



     oral                                         Refill(s)           



     capsule,                                                        



     extended                                                        



     release                                                        

 

     tramadol      2019      Yes                      150 mg = 1           [facility]

[facility]



     150 mg/24      0-25                               cap, PO,           l



     hours oral      15:07:                               Daily, 0           Her

[facility]and[facility]



     capsule,      00                                 Refill(s)           



     extended                                                        



     release                                                        

 

     Acetaminoph      2019      Yes                      1 tab, PO,           

[facility]



     en 325 MG /      0-25                               Q6H, 0           l



     Hydrocodone      15:07:                               Refill(s)           H

[facility]



     Bitartrate      00                                                



     5 MG Oral                                                        



     Tablet                                                        



     [Norco                                                        



     5/325]                                                        

 

     apixaban 2019      Yes                      5 mg, PO,           Me

[facility]



     MG Oral      0-25                               Q12H, 0           l



     Tablet      15:07:                               Refill(s)           Donny

n



     [Eliquis]      00                                                

 

     metoprolol      2019      Yes                      50 mg = 1           [facility]

[facility]



     tartrate 50      0-25                               tab, PO,           l



     mg oral      15:07:                               BID, # 180           [facility]

daryl



     tablet      00                                 tab, 0           



                                                  Refill(s)           

 

     Diltiazem      2019      Yes                      180 mg = 1           [facility]

[facility]



     Hydrochlori      0-25                               cap, PO,           l



     de       15:07:                               Daily, #           [facility]

daryl



     mg/24 hours      00                                 30 cap, 0           



     oral                                         Refill(s)           



     capsule,                                                        



     extended                                                        



     release                                                        

 

     tramadol      2019      Yes                      150 mg = 1           [facility]

[facility]



     150 mg/24      0-25                               cap, PO,           l



     hours oral      15:07:                               Daily, 0           Her

[facility]



     capsule,      00                                 Refill(s)           



     extended                                                        



     release                                                        

 

     Acetaminoph      2019      Yes                      1 tab, PO,           

[facility]



     en 325 MG /      0-25                               Q6H, 0           l



     Hydrocodone      15:07:                               Refill(s)           [facility]

[facility]



     Bitartrate      00                                                



     5 MG Oral                                                        



     Tablet                                                        



     [Norco                                                        



     5/325]                                                        

 

     apixaban 5      2019      Yes                      5 mg, PO,           Me

[facility]



     MG Oral      0-25                               Q12H, 0           l



     Tablet      15:07:                               Refill(s)           Donny martinez



     [Eliquis]      00                                                

 

     metoprolol      2019      Yes                      50 mg = 1           Me

[facility]



     tartrate 50      0-25                               tab, PO,           l



     mg oral      15:07:                               BID, # 180           [facility]

d[facility]



     tablet      00                                 tab, 0           



                                                  Refill(s)           

 

     Diltiazem      2019      Yes                      180 mg = 1           [facility]

[facility]



     Hydrochlori      0-25                               cap, PO,           l



     de       15:07:                               Daily, #           [facility]

d[facility]



     mg/24 hours      00                                 30 cap, 0           



     oral                                         Refill(s)           



     capsule,                                                        



     extended                                                        



     release                                                        

 

     tramadol      2019      Yes                      150 mg = 1           [facility]

[facility]



     150 mg/24      0-25                               cap, PO,           l



     hours oral      15:07:                               Daily, 0           Her

[facility]



     capsule,      00                                 Refill(s)           



     extended                                                        



     release                                                        

 

     Acetaminoph      2019      Yes                      1 tab, PO,           

[facility]



     en 325 MG /      0-25                               Q6H, 0           l



     Hydrocodone      15:07:                               Refill(s)           H

[facility]



     Bitartrate      00                                                



     5 MG Oral                                                        



     Tablet                                                        



     [Norco                                                        



     5/325]                                                        

 

     apixaban 5      2019      Yes                      5 mg, PO,           Me

[facility]



     MG Oral      0-25                               Q12H, 0           l



     Tablet      15:07:                               Refill(s)           Donny

n



     [Eliquis]      00                                                

 

     metoprolol      2019      Yes                      50 mg = 1           Me

[facility]



     tartrate 50      0-25                               tab, PO,           l



     mg oral      15:07:                               BID, # 180           [facility]

daryl



     tablet      00                                 tab, 0           



                                                  Refill(s)           

 

     Diltiazem      2019      Yes                      180 mg = 1           Me

[facility]



     Hydrochlori      0-25                               cap, PO,           l



     de       15:07:                               Daily, #           [facility]

d[facility]



     mg/24 hours      00                                 30 cap, 0           



     oral                                         Refill(s)           



     capsule,                                                        



     extended                                                        



     release                                                        

 

     tramadol      2019      Yes                      150 mg = 1           [facility]

[facility]



     150 mg/24      0-25                               cap, PO,           l



     hours oral      15:07:                               Daily, 0           Her

[facility]



     capsule,      00                                 Refill(s)           



     extended                                                        



     release                                                        

 

     Acetaminoph      2019      Yes                      1 tab, PO,           

[facility]



     en 325 MG /      0-25                               Q6H, 0           l



     Hydrocodone      15:07:                               Refill(s)           H

[facility]



     Bitartrate      00                                                



     5 MG Oral                                                        



     Tablet                                                        



     [Norco                                                        



     5/325]                                                        

 

     apixaban 5      2019      Yes                      5 mg, PO,           Me

[facility]



     MG Oral      0-25                               Q12H, 0           l



     Tablet      15:07:                               Refill(s)           Donny

n



     [Eliquis]      00                                                

 

     metoprolol      2019      Yes                      50 mg = 1           Me

[facility]



     tartrate 50      0-25                               tab, PO,           l



     mg oral      15:07:                               BID, # 180           [facility]

daryl



     tablet      00                                 tab, 0           



                                                  Refill(s)           

 

     Diltiazem      2019      Yes                      180 mg = 1           Me

[facility]



     Hydrochlori      0-25                               cap, PO,           l



     de       15:07:                               Daily, #           [facility]

daryl



     mg/24 hours      00                                 30 cap, 0           



     oral                                         Refill(s)           



     capsule,                                                        



     extended                                                        



     release                                                        

 

     tramadol      2019      Yes                      150 mg = 1           [facility]

[facility]



     150 mg/24      0-25                               cap, PO,           l



     hours oral      15:07:                               Daily, 0           Her

[facility]



     capsule,      00                                 Refill(s)           



     extended                                                        



     release                                                        

 

     Acetaminoph      2019      Yes                      1 tab, PO,           

[facility]



     en 325 MG /      0-25                               Q6H, 0           l



     Hydrocodone      15:07:                               Refill(s)           H

[facility]



     Bitartrate      00                                                



     5 MG Oral                                                        



     Tablet                                                        



     [Norco                                                        



     5/325]                                                        

 

     apixaban 5      2019      Yes                      5 mg, PO,           Me

[facility]



     MG Oral      0-25                               Q12H, 0           l



     Tablet      15:07:                               Refill(s)           Donny

n



     [Eliquis]      00                                                

 

     metoprolol      2019      Yes                      50 mg = 1           Me

[facility]



     tartrate 50      0-25                               tab, PO,           l



     mg oral      15:07:                               BID, # 180           [facility]

daryl



     tablet      00                                 tab, 0           



                                                  Refill(s)           

 

     Diltiazem      2019      Yes                      180 mg = 1           Me

[facility]



     Hydrochlori      0-25                               cap, PO,           l



     de       15:07:                               Daily, #           [facility]

daryl



     mg/24 hours      00                                 30 cap, 0           



     oral                                         Refill(s)           



     capsule,                                                        



     extended                                                        



     release                                                        

 

     tramadol      2019      Yes                      150 mg = 1           [facility]

[facility]



     150 mg/24      0-25                               cap, PO,           l



     hours oral      15:07:                               Daily, 0           Her

hernand[facility]



     capsule,      00                                 Refill(s)           



     extended                                                        



     release                                                        

 

     Acetaminoph      2019      Yes                      1 tab, PO,           

[facility]



     en 325 MG /      0-25                               Q6H, 0           l



     Hydrocodone      15:07:                               Refill(s)           H

[facility]



     Bitartrate      00                                                



     5 MG Oral                                                        



     Tablet                                                        



     [Norco                                                        



     5/325]                                                        

 

     apixaban 5      2019      Yes                      5 mg, PO,           Me

[facility]



     MG Oral      0-25                               Q12H, 0           l



     Tablet      15:07:                               Refill(s)           Donny

n



     [Eliquis]      00                                                

 

     metoprolol      2019      Yes                      50 mg = 1           Me

[facility]



     tartrate 50      0-25                               tab, PO,           l



     mg oral      15:07:                               BID, # 180           [facility]

daryl



     tablet      00                                 tab, 0           



                                                  Refill(s)           

 

     Diltiazem      2019      Yes                      180 mg = 1           Me

[facility]



     Hydrochlori      0-25                               cap, PO,           l



     de       15:07:                               Daily, #           [facility]

daryl



     mg/24 hours      00                                 30 cap, 0           



     oral                                         Refill(s)           



     capsule,                                                        



     extended                                                        



     release                                                        

 

     tramadol      2019      Yes                      150 mg = 1           [facility]

[facility]



     150 mg/24      0-25                               cap, PO,           l



     hours oral      15:07:                               Daily, 0           Her

hern[facility]



     capsule,      00                                 Refill(s)           



     extended                                                        



     release                                                        

 

     Acetaminoph      2019      Yes                      1 tab, PO,           

[facility]



     en 325 MG /      0-25                               Q6H, 0           l



     Hydrocodone      15:07:                               Refill(s)           H

[facility]



     Bitartrate      00                                                



     5 MG Oral                                                        



     Tablet                                                        



     [Norco                                                        



     5/325]                                                        

 

     calcitriol      2019      Yes                      = 1 cap,           [facility]

[facility]



     0.25 mcg      0-11                               PO, Daily,           l



     oral      21:10:                               # 90           [place]



     capsule      30                                 unknown           



                                                  unit,           



                                                  Pharmacy:           



                                                  [facility]           



                                                  [facility]           



                                                  #[id number]           

 

     calcitriol      2019      Yes                      = 1 cap,           [facility]

[facility]



     0.25 mcg      0-11                               PO, Daily,           l



     oral      21:10:                               # 90           [place]



     capsule      30                                 unknown           



                                                  unit,           



                                                  Pharmacy:           



                                                  [facility]           



                                                  [facility] [place]           



                                                  #[id number]           

 

     calcitriol      2019      Yes                      = 1 cap,           Mem

[facility]



     0.25 mcg      0-11                               PO, Daily,           l



     oral      21:10:                               # 90           [place]



     capsule      30                                 unknown           



                                                  unit,           



                                                  Pharmacy:           



                                                  [facility]           



                                                  [facility] [place]           



                                                  #[id number]           

 

     calcitriol      2019      Yes                      = 1 cap,           [facility]

[facility]



     0.25 mcg      0-11                               PO, Daily,           l



     oral      21:10:                               # 90           [place]



     capsule      30                                 unknown           



                                                  unit,           



                                                  Pharmacy:           



                                                  [facility]           



                                                  [facility] [place]           



                                                  #[id number]           

 

     calcitriol      2019      Yes                      = 1 cap,           [facility]

[facility]



     0.25 mcg      0-11                               PO, Daily,           l



     oral      21:10:                               # 90           [place]



     capsule      30                                 unknown           



                                                  unit,           



                                                  Pharmacy:           



                                                  [facility]           



                                                  [facility] [place]           



                                                  #[id number]           

 

     calcitriol      2019      Yes                      = 1 cap,           [facility]

[facility]



     0.25 mcg      0-11                               PO, Daily,           l



     oral      21:10:                               # 90           [place]



     capsule      30                                 unknown           



                                                  unit,           



                                                  Pharmacy:           



                                                  [facility]           



                                                  [facility] [place]           



                                                  #[id number]           

 

     calcitriol      2019      Yes                      = 1 cap,           [facility]

[facility]



     0.25 mcg      0-11                               PO, Daily,           l



     oral      21:10:                               # 90           [place]



     capsule      30                                 unknown           



                                                  unit,           



                                                  Pharmacy:           



                                                  [facility]           



                                                  [facility] [place]           



                                                  #[id number]           

 

     gabapentin      2019-0      Yes                      300 mg = 1           M

[facility]



     300 MG Oral      9-27                               cap, PO,           l



     Capsule      20:54:                               BID, # 180           [facility]

daryl



               00                                 cap, 3           



                                                  Refill(s),           



                                                  called to           



                                                  pharmacy           

 

     gabapentin      2019-0      Yes                      300 mg = 1           M

[facility]



     300 MG Oral      9-27                               cap, PO,           l



     Capsule      20:54:                               BID, # 180           [facility]

daryl



               00                                 cap, 3           



                                                  Refill(s),           



                                                  called to           



                                                  pharmacy           

 

     gabapentin      2019-0      Yes                      300 mg = 1           M

[facility]



     300 MG Oral      9-27                               cap, PO,           l



     Capsule      20:54:                               BID, # 180           [facility]

daryl



               00                                 cap, 3           



                                                  Refill(s),           



                                                  called to           



                                                  pharmacy           

 

     gabapentin      2019-0      Yes                      300 mg = 1           M

[facility]



     300 MG Oral      9-27                               cap, PO,           l



     Capsule      20:54:                               BID, # 180           [facility]

daryl



               00                                 cap, 3           



                                                  Refill(s),           



                                                  called to           



                                                  pharmacy           

 

     gabapentin      2019-0      Yes                      300 mg = 1           M

[facility]



     300 MG Oral      9-27                               cap, PO,           l



     Capsule      20:54:                               BID, # 180           [facility]

daryl



               00                                 cap, 3           



                                                  Refill(s),           



                                                  called to           



                                                  pharmacy           

 

     gabapentin      2019-0      Yes                      300 mg = 1           M

emoria



     300 MG Oral      9-27                               cap, PO,           l



     Capsule      20:54:                               BID, # 180           Herm

daryl



               00                                 cap, 3           



                                                  Refill(s),           



                                                  called to           



                                                  pharmacy           

 

     gabapentin      2019-      Yes                      300 mg = 1           M

emoria



     300 MG Oral      9-27                               cap, PO,           l



     Capsule      20:54:                               BID, # 180           Herm

daryl



               00                                 cap, 3           



                                                  Refill(s),           



                                                  called to           



                                                  pharmacy           

 

     allopurinol      2019-0      Yes                      = 1 tab,           Me

moria



     300 mg oral      8-17                               PO, Daily,           l



     tablet      02:39:                               # 90 tab,           Donny

n



                                                Pharmacy:           



                                                  [facility]           



                                                  [facility] [place]           



                                                  #[id number]           

 

     allopurinol      2019-0      Yes                      = 1 tab,           Me

moria



     300 mg oral      8-17                               PO, Daily,           l



     tablet      02:39:                               # 90 tab,           Donny martinez



                                                Pharmacy:           



                                                  [facility] [place]           



                                                  [id number]           

 

     allopurinol      2019-0      Yes                      = 1 tab,           Me

moria



     300 mg oral      8-17                               PO, Daily,           l



     tablet      02:39:                               # 90 tab,           Donny

n



                                                Pharmacy:           



                                                  [facility]           



                                                  [facility]           



                                                  #[id number]           

 

     allopurinol      2019-0      Yes                      = 1 tab,           Me

moria



     300 mg oral      8-17                               PO, Daily,           l



     tablet      02:39:                               # 90 tab,           Donny

n



                                                Pharmacy:           



                                                  [facility]           



                                                  [facility]           



                                                  #[id number]           

 

     allopurinol      2019-0      Yes                      = 1 tab,           Me

moria



     300 mg oral      8-17                               PO, Daily,           l



     tablet      02:39:                               # 90 tab,           Donny

n



                                                Pharmacy:           



                                                  [facility]           



                                                  [facility] [place]           



                                                  #[id number]           

 

     allopurinol      2019-0      Yes                      = 1 tab,           Me

moria



     300 mg oral      8-17                               PO, Daily,           l



     tablet      02:39:                               # 90 tab,           Donny

n



                                                Pharmacy:           



                                                  [facility]           



                                                  [facility]           



                                                  #[id number]           

 

     allopurinol      2019-0      Yes                      = 1 tab,           Me

moria



     300 mg oral      8-17                               PO, Daily,           l



     tablet      02:39:                               # 90 tab           Donny

n



                                                Pharmacy:           



                                                  [facility]           



                                                  [facility] [place]           



                                                  #[id number]           

 

     QUEtiapine      2019-0      Yes                      50 mg = 1           Me

moria



     50 mg oral      6-06                               tab, PO,           l



     tablet      15:28:                               Bedtime, #           [facility]

nn



               00                                 30 tab, 1           



                                                  Refill(s)           

 

     QUEtiapine      2019-0      Yes                      50 mg = 1           Me

moria



     50 mg oral      6-06                               tab, PO,           l



     tablet      15:28:                               Bedtime, #           Meredith

nn



               00                                 30 tab, 1           



                                                  Refill(s)           

 

     QUEtiapine      2019-0      Yes                      50 mg = 1           Me

moria



     50 mg oral      6-06                               tab, PO,           l



     tablet      15:28:                               Bedtime, #           [facility]

nn



               00                                 30 tab, 1           



                                                  Refill(s)           

 

     QUEtiapine      2019-0      Yes                      50 mg = 1           Me

moria



     50 mg oral      6-06                               tab, PO,           l



     tablet      15:28:                               Bedtime, #           [facility]

nn



               00                                 30 tab, 1           



                                                  Refill(s)           

 

     QUEtiapine      2019-0      Yes                      50 mg = 1           Me

moria



     50 mg oral      6-06                               tab, PO,           l



     tablet      15:28:                               Bedtime, #           [facility]

nn



               00                                 30 tab, 1           



                                                  Refill(s)           

 

     QUEtiapine      2019-0      Yes                      50 mg = 1           Me

moria



     50 mg oral      6-06                               tab, PO,           l



     tablet      15:28:                               Bedtime, #           [facility]

nn



               00                                 30 tab, 1           



                                                  Refill(s)           

 

     QUEtiapine      2019-0      Yes                      50 mg = 1           Me

moria



     50 mg oral      6-06                               tab, PO,           l



     tablet      15:28:                               Bedtime, #           [facility]

nn



               00                                 30 tab, 1           



                                                  Refill(s)           

 

     eletriptan      2019-0      Yes                      40 mg = 1           Me

moria



     40 mg oral      6-06                               tab, PO,           l



     tablet      15:26:                               Daily, PRN           [facility]

nn



               00                                 for            



                                                  migraine           



                                                  headache,           



                                                  may repeat           



                                                  dose once           



                                                  in 2           



                                                  hours, # 6           



                                                  tab, 0           



                                                  Refill(s)           

 

     eletriptan      2019-0      Yes                      40 mg = 1           Me

moria



     40 mg oral      6-06                               tab, PO,           l



     tablet      15:26:                               Daily, PRN           [facility]

nn



               00                                 for            



                                                  migraine           



                                                  headache,           



                                                  may repeat           



                                                  dose once           



                                                  in 2           



                                                  hours, # 6           



                                                  tab, 0           



                                                  Refill(s)           

 

     eletriptan      2019-0      Yes                      40 mg = 1           Me

moria



     40 mg oral      6-06                               tab, PO,           l



     tablet      15:26:                               Daily, PRN           [facility]

nn



               00                                 for            



                                                  migraine           



                                                  headache,           



                                                  may repeat           



                                                  dose once           



                                                  in 2           



                                                  hours, # 6           



                                                  tab, 0           



                                                  Refill(s)           

 

     eletriptan      2019-0      Yes                      40 mg = 1           Me

moria



     40 mg oral      6-06                               tab, PO,           l



     tablet      15:26:                               Daily, PRN           [facility]

nn



               00                                 for            



                                                  migraine           



                                                  headache,           



                                                  may repeat           



                                                  dose once           



                                                  in 2           



                                                  hours, # 6           



                                                  tab, 0           



                                                  Refill(s)           

 

     eletriptan            Yes                      40 mg = 1           Me

moria



     40 mg oral      6-06                               tab, PO,           l



     tablet      15:26:                               Daily, PRN           [facility]

nn



               00                                 for            



                                                  migraine           



                                                  headache,           



                                                  may repeat           



                                                  dose once           



                                                  in 2           



                                                  hours, # 6           



                                                  tab, 0           



                                                  Refill(s)           

 

     eletriptan            Yes                      40 mg = 1           Me

moria



     40 mg oral      6-06                               tab, PO,           l



     tablet      15:26:                               Daily, PRN           [facility]

nn



               00                                 for            



                                                  migraine           



                                                  headache,           



                                                  may repeat           



                                                  dose once           



                                                  in 2           



                                                  hours, # 6           



                                                  tab, 0           



                                                  Refill(s)           

 

     eletriptan            Yes                      40 mg = 1           Me

moria



     40 mg oral      6-06                               tab, PO,           l



     tablet      15:26:                               Daily, PRN           [facility]

nn



               00                                 for            



                                                  migraine           



                                                  headache,           



                                                  may repeat           



                                                  dose once           



                                                  in 2           



                                                  hours, # 6           



                                                  tab, 0           



                                                  Refill(s)           

 

     atorvastati            Yes                      See            [facility]

a



     n 40 mg      3-14                               Instructio           l



     oral tablet      15:07:                               ns, TAKE 1           

[place]



               35                                 TABLET BY           



                                                  MOUTH           



                                                  EVERY           



                                                  NIGHT AT           



                                                  BEDTIME, #           



                                                  90 tab, 1           



                                                  Refill(s),           



                                                  Pharmacy:           



                                                  [facility]           



                                                  [facility]           



                                                  [facility]           

 

     ator[facility] Networksti            Yes                      See            [facility]

a



     n 40 mg      3-14                               Instructio           l



     oral tablet      15:07:                               ns, TAKE 1           

[place]



               35                                 TABLET BY           



                                                  MOUTH           



                                                  EVERY           



                                                  NIGHT AT           



                                                  BEDTIME, #           



                                                  90 tab, 1           



                                                  Refill(s),           



                                                  Pharmacy:           



                                                  [email or web address]           



                                                  [facility]           

 

     atorvastati            Yes                      See            [facility]

a



     n 40 mg      3-14                               Instructio           l



     oral tablet      15:07:                               ns, TAKE 1           

[place]



               35                                 TABLET BY           



                                                  MOUTH           



                                                  EVERY           



                                                  NIGHT AT           



                                                  BEDTIME, #           



                                                  90 tab, 1           



                                                  Refill(s),           



                                                  Pharmacy:           



                                                  [email or web address]           



                                                  [facility]           

 

     atorI[facility] Networkstati            Yes                      See            [facility]

a



     n 40 mg      3-14                               Instructio           l



     oral tablet      15:07:                               ns, TAKE 1           

[place]



               35                                 TABLET BY           



                                                  MOUTH           



                                                  EVERY           



                                                  NIGHT AT           



                                                  BEDTIME, #           



                                                  90 tab, 1           



                                                  Refill(s),           



                                                  Pharmacy:           



                                                  [email or web address]           



                                                  [facility]           

 

     atorvastati            Yes                      See            [facility]

a



     n 40 mg      3-14                               Instructio           l



     oral tablet      15:07:                               ns, TAKE 1           

[place]



               35                                 TABLET BY           



                                                  MOUTH           



                                                  EVERY           



                                                  NIGHT AT           



                                                  BEDTIME, #           



                                                  90 tab, 1           



                                                  Refill(s),           



                                                  Pharmacy:           



                                                  [address]            Yes                      See            [facility]

a



     n 40 mg      3-14                               Instructio           l



     oral tablet      15:07:                               ns, TAKE 1           

[place]



               35                                 TABLET BY           



                                                  MOUTH           



                                                  EVERY           



                                                  NIGHT AT           



                                                  BEDTIME, #           



                                                  90 tab, 1           



                                                  Refill(s),           



                                                  Pharmacy:           



                                                  [place] [id number]           

 

     [facility]            Yes                      See            [facility]

a



     n 40 mg      3-14                               Instructio           l



     oral tablet      15:07:                               ns, TAKE 1           

[place]



               35                                 TABLET BY           



                                                  MOUTH           



                                                  EVERY           



                                                  NIGHT AT           



                                                  BEDTIME, #           



                                                  90 tab, 1           



                                                  Refill(s),           



                                                  Pharmacy:           



                                                  [address]            Yes                      See            [facility]



     5 mg oral      3-14                               Instructio           l



     tablet      15:07:                               ns, TAKE 1           [facility]

nn



               33                                 TABLET BY           



                                                  MOUTH           



                                                  EVERY DAY,           



                                                  # 90 tab,           



                                                  1              



                                                  Refill(s),           



                                                  Pharmacy:           



                                                  [address]            Yes                      See            [facility]



     5 mg oral      3-14                               Instructio           l



     tablet      15:07:                               ns, TAKE 1           [facility]

nn



               33                                 TABLET BY           



                                                  MOUTH           



                                                  EVERY DAY,           



                                                  # 90 tab,           



                                                  1              



                                                  Refill(s),           



                                                  Pharmacy:           



                                                  [address]            Yes                      See            [facility]



     5 mg oral      3-14                               Instructio           l



     tablet      15:07:                               ns, TAKE 1           [facility]

nn



               33                                 TABLET BY           



                                                  MOUTH           



                                                  EVERY DAY,           



                                                  # 90 tab,           



                                                  1              



                                                  Refill(s),           



                                                  Pharmacy:           



                                                  [address]            Yes                      See            [facility]



     5 mg oral      3-14                               Instructio           l



     tablet      15:07:                               ns, TAKE 1           [facility]

nn



               33                                 TABLET BY           



                                                  MOUTH           



                                                  EVERY DAY,           



                                                  # 90 tab,           



                                                  1              



                                                  Refill(s),           



                                                  Pharmacy:           



                                                  [place] [address]           

 

     amLODIPine            Yes                      See            [facility]



     5 mg oral      3-14                               Instructio           l



     tablet      15:07:                               ns, TAKE 1           [facility]

[facility]



               33                                 TABLET BY           



                                                  MOUTH           



                                                  EVERY DAY,           



                                                  # 90 tab,           



                                                  1              



                                                  Refill(s),           



                                                  Pharmacy:           



                                                  [place] [address]           

 

     amLODIPine            Yes                      See            [facility]



     5 mg oral      3-14                               Instructio           l



     tablet      15:07:                               ns, TAKE 1           [facility]

[facility]



               33                                 TABLET BY           



                                                  MOUTH           



                                                  EVERY DAY,           



                                                  # 90 tab,           



                                                  1              



                                                  Refill(s),           



                                                  Pharmacy:           



                                                  [facility]           



                                                  [facility]           



                                                  [id number]           

 

     amLODIPine            Yes                      See            [facility]



     5 mg oral      3-14                               Instructio           l



     tablet      15:07:                               ns, TAKE 1           [facility]

[facility]



               33                                 TABLET BY           



                                                  MOUTH           



                                                  EVERY DAY,           



                                                  # 90 tab,           



                                                  1              



                                                  Refill(s),           



                                                  Pharmacy:           



                                                  [facility]           



                                                  [facility]           



                                                  [id number]           

 

     lisinopril            Yes                      See            [facility]



     5 mg oral      8-21                               Instructio           l



     tablet      19:00:                               ns, TAKE 1           [facility]

[facility]



               30                                 TABLET BY           



                                                  MOUTH           



                                                  DAILY, #           



                                                  90 tab, 1           



                                                  Refill(s),           



                                                  Pharmacy:           



                                                  [facility]           



                                                  [facility]           



                                                  [id number]           

 

     lisinopril            Yes                      See            [facility]



     5 mg oral      8-21                               Instructio           l



     tablet      19:00:                               ns, TAKE 1           [facility]

[facility]



               30                                 TABLET BY           



                                                  MOUTH           



                                                  DAILY, #           



                                                  90 tab, 1           



                                                  Refill(s),           



                                                  Pharmacy:           



                                                  [facility]           



                                                  [facility]           



                                                  [id number]           

 

     lisinopril            Yes                      See            [facility]



     5 mg oral      8-21                               Instructio           l



     tablet      19:00:                               ns, TAKE 1           [facility]

[facility]



               30                                 TABLET BY           



                                                  MOUTH           



                                                  DAILY, #           



                                                  90 tab, 1           



                                                  Refill(s),           



                                                  Pharmacy:           



                                                  [facility]           



                                                  [facility]           



                                                  [id number]           

 

     lisinopril            Yes                      See            [facility]



     5 mg oral      8-21                               Instructio           l



     tablet      19:00:                               ns, TAKE 1           [facility]

[facility]



               30                                 TABLET BY           



                                                  MOUTH           



                                                  DAILY, #           



                                                  90 tab, 1           



                                                  Refill(s),           



                                                  Pharmacy:           



                                                  [facility]           



                                                  [facility]           



                                                  [id number]           

 

     lisinopril            Yes                      See            [facility]



     5 mg oral      8-21                               Instructio           l



     tablet      19:00:                               ns, TAKE 1           [facility]

[facility]



               30                                 TABLET BY           



                                                  MOUTH           



                                                  DAILY, #           



                                                  90 tab, 1           



                                                  Refill(s),           



                                                  Pharmacy:           



                                                  [facility]           



                                                  [facility]           



                                                  [id number]           

 

     lisinopril            Yes                      See            [facility]



     5 mg oral      8-21                               Instructio           l



     tablet      19:00:                               ns, TAKE 1           [facility]

[facility]



               30                                 TABLET BY           



                                                  MOUTH           



                                                  DAILY, #           



                                                  90 tab, 1           



                                                  Refill(s),           



                                                  Pharmacy:           



                                                  [facility]           



                                                  [facility]           



                                                  [id number]           

 

     lisinopril            Yes                      See            [facility]



     5 mg oral      8-21                               Instructio           l



     tablet      19:00:                               ns, TAKE 1           [facility]

[facility]



               30                                 TABLET BY           



                                                  MOUTH           



                                                  DAILY, #           



                                                  90 tab, 1           



                                                  Refill(s),           



                                                  Pharmacy:           



                                                  [address]            Yes                      See            [facility]

a



     n 40 mg      8-21                               Instructio           l



     oral tablet      18:50:                               ns, TAKE 1           

[place]



               45                                 TABLET BY           



                                                  MOUTH           



                                                  EVERY           



                                                  NIGHT AT           



                                                  BEDTIME, #           



                                                  30 tab, 5           



                                                  Refill(s),           



                                                  Pharmacy:           



                                                  [address]            Yes                      See            [facility]

a



     n 40 mg      8-21                               Instructio           l



     oral tablet      18:50:                               ns, TAKE 1           

[place]



               45                                 TABLET BY           



                                                  MOUTH           



                                                  EVERY           



                                                  NIGHT AT           



                                                  BEDTIME, #           



                                                  30 tab, 5           



                                                  Refill(s),           



                                                  Pharmacy:           



                                                  [address]            Yes                      See            [facility]

a



     n 40 mg      8-21                               Instructio           l



     oral tablet      18:50:                               ns, TAKE 1           

[place]



               45                                 TABLET BY           



                                                  MOUTH           



                                                  EVERY           



                                                  NIGHT AT           



                                                  BEDTIME, #           



                                                  30 tab, 5           



                                                  Refill(s),           



                                                  Pharmacy:           



                                                  [address]            Yes                      See            [facility]

a



     n 40 mg      8-21                               Instructio           l



     oral tablet      18:50:                               ns, TAKE 1           

[facility]



               45                                 TABLET BY           



                                                  MOUTH           



                                                  EVERY           



                                                  NIGHT AT           



                                                  BEDTIME, #           



                                                  30 tab, 5           



                                                  Refill(s),           



                                                  Pharmacy:           



                                                  [address]            Yes                      See            [facility]

a



     n 40 mg      8-21                               Instructio           l



     oral tablet      18:50:                               ns, TAKE 1           

[facility]



               45                                 TABLET BY           



                                                  MOUTH           



                                                  EVERY           



                                                  NIGHT AT           



                                                  BEDTIME, #           



                                                  30 tab, 5           



                                                  Refill(s),           



                                                  Pharmacy:           



                                                  [address]            Yes                      See            [facility]

a



     n 40 mg      8-21                               Instructio           l



     oral tablet      18:50:                               ns, TAKE 1           

[place]



               45                                 TABLET BY           



                                                  MOUTH           



                                                  EVERY           



                                                  NIGHT AT           



                                                  BEDTIME, #           



                                                  30 tab, 5           



                                                  Refill(s),           



                                                  Pharmacy:           



                                                  [address]            Yes                      See            [facility]

a



     n 40 mg      8-21                               Instructio           l



     oral tablet      18:50:                               ns, TAKE 1           

[place]



               45                                 TABLET BY           



                                                  MOUTH           



                                                  EVERY           



                                                  NIGHT AT           



                                                  BEDTIME, #           



                                                  30 tab, 5           



                                                  Refill(s),           



                                                  Pharmacy:           



                                                  [place] [id number]           

 

     amLODIPine            Yes                      See            [facility]



     5 mg oral      8-21                               Instructio           l



     tablet      18:50:                               ns, TAKE 1           [facility]

[facility]



               41                                 TABLET BY           



                                                  MOUTH           



                                                  EVERY DAY,           



                                                  # 30 tab,           



                                                  5              



                                                  Refill(s),           



                                                  Pharmacy:           



                                                  [place] [address]           

 

     amLODIPine            Yes                      See            [facility]



     5 mg oral      8-21                               Instructio           l



     tablet      18:50:                               ns, TAKE 1           [facility]

[facility]



               41                                 TABLET BY           



                                                  MOUTH           



                                                  EVERY DAY,           



                                                  # 30 tab,           



                                                  5              



                                                  Refill(s),           



                                                  Pharmacy:           



                                                  [place] [address]           

 

     amLODIPine            Yes                      See            [facility]



     5 mg oral      8-21                               Instructio           l



     tablet      18:50:                               ns, TAKE 1           [facility]

[facility]



               41                                 TABLET BY           



                                                  MOUTH           



                                                  EVERY DAY,           



                                                  # 30 tab,           



                                                  5              



                                                  Refill(s),           



                                                  Pharmacy:           



                                                  [place] [address]           

 

     amLODIPine            Yes                      See            [facility]



     5 mg oral      8-21                               Instructio           l



     tablet      18:50:                               ns, TAKE 1           [facility]

[facility]



               41                                 TABLET BY           



                                                  MOUTH           



                                                  EVERY DAY,           



                                                  # 30 tab,           



                                                  5              



                                                  Refill(s),           



                                                  Pharmacy:           



                                                  [place] [address]           

 

     amLODIPine            Yes                      See            [facility]



     5 mg oral      8-21                               Instructio           l



     tablet      18:50:                               ns, TAKE 1           [facility]

[facility]



               41                                 TABLET BY           



                                                  MOUTH           



                                                  EVERY DAY,           



                                                  # 30 tab,           



                                                  5              



                                                  Refill(s),           



                                                  Pharmacy:           



                                                  [place] [address]           

 

     amLODIPine            Yes                      See            [facility]



     5 mg oral      8-21                               Instructio           l



     tablet      18:50:                               ns, TAKE 1           [facility]

[facility]



               41                                 TABLET BY           



                                                  MOUTH           



                                                  EVERY DAY,           



                                                  # 30 tab,           



                                                  5              



                                                  Refill(s),           



                                                  Pharmacy:           



                                                  [place] [address]           

 

     amLODIPine            Yes                      See            [facility]



     5 mg oral      8-21                               Instructio           l



     tablet      18:50:                               ns, TAKE 1           [facility]

[facility]



               41                                 TABLET BY           



                                                  MOUTH           



                                                  EVERY DAY,           



                                                  # 30 tab,           



                                                  5              



                                                  Refill(s),           



                                                  Pharmacy:           



                                                  [place] [address]           

 

     lisinopril            No                       See            [facility]



     5 mg oral      7-31                               Instructio           l



     tablet      15:28:                               ns, # 90           [facility]



               34                                 tab, TAKE           



                                                  1 TABLET           



                                                  BY MOUTH           



                                                  DAILY,           



                                                  Pharmacy:           



                                                  [place] [address]           

 

     lisinopril            No                       See            [facility]



     5 mg oral      7-31                               Instructio           l



     tablet      15:28:                               ns, # 90           [facility]



               34                                 tab, TAKE           



                                                  1 TABLET           



                                                  BY MOUTH           



                                                  DAILY,           



                                                  Pharmacy:           



                                                  [place] [address]           

 

     lisinopril            No                       See            [facility]



     5 mg oral      7-31                               Instructio           l



     tablet      15:28:                               ns, # 90           [place]



               34                                 tab, TAKE           



                                                  1 TABLET           



                                                  BY MOUTH           



                                                  DAILY,           



                                                  Pharmacy:           



                                                  [facility]           



                                                  [facility]           



                                                  [id number]           

 

     lisinopril      -0      No                       See            [facility]



     5 mg oral      7-31                               Instructio           l



     tablet      15:28:                               ns, # 90           [place]



               34                                 tab, TAKE           



                                                  1 TABLET           



                                                  BY MOUTH           



                                                  DAILY,           



                                                  Pharmacy:           



                                                  [facility]           



                                                  [facility]           



                                                  [id number]           

 

     lisinopril      0      No                       See            [facility]



     5 mg oral      7-31                               Instructio           l



     tablet      15:28:                               ns, # 90           [place]



               34                                 tab, TAKE           



                                                  1 TABLET           



                                                  BY MOUTH           



                                                  DAILY,           



                                                  Pharmacy:           



                                                  [facility]           



                                                  [facility]           



                                                  [id number]           

 

     lisinopril      0      No                       See            [facility]



     5 mg oral      7-31                               Instructio           l



     tablet      15:28:                               ns, # 90           [place]



               34                                 tab, TAKE           



                                                  1 TABLET           



                                                  BY MOUTH           



                                                  DAILY,           



                                                  Pharmacy:           



                                                  [facility]           



                                                  [facility]           



                                                  [id number]           

 

     lisinopril      0      No                       See            [facility]



     5 mg oral      7-31                               Instructio           l



     tablet      15:28:                               ns, # 90           [place]



               34                                 tab, TAKE           



                                                  1 TABLET           



                                                  BY MOUTH           



                                                  DAILY,           



                                                  Pharmacy:           



                                                  [facility]           



                                                  [facility]           



                                                  [id number]           

 

     allopurinol      -0      No                       300 mg = 1           

[facility]



     300 mg oral      5-10                               tab, PO,           l



     tablet      13:59:                               Daily, #           [place]



               00                                 90 tab, 1           



                                                  Refill(s),           



                                                  Pharmacy:           



                                                  [facility]           



                                                  [facility]           



                                                  [id number]           

 

     allopurinol            No                       300 mg = 1           

[facility]



     300 mg oral      5-10                               tab, PO,           l



     tablet      13:59:                               Daily, #           [place]



               00                                 90 tab, 1           



                                                  Refill(s),           



                                                  Pharmacy:           



                                                  [facility]           



                                                  [facility]           



                                                  [id number]           

 

     allopurinol      -0      No                       300 mg = 1           

[facility]



     300 mg oral      5-10                               tab, PO,           l



     tablet      13:59:                               Daily, #           [place]



               00                                 90 tab, 1           



                                                  Refill(s),           



                                                  Pharmacy:           



                                                  [facility]           



                                                  [facility]           



                                                  [id number]           

 

     allopurinol            No                       300 mg = 1           

[facility]



     300 mg oral      5-10                               tab, PO,           l



     tablet      13:59:                               Daily, #           [place]



               00                                 90 tab, 1           



                                                  Refill(s),           



                                                  Pharmacy:           



                                                  [facility]           



                                                  [facility]           



                                                  [id number]           

 

     allopurinol      -0      No                       300 mg = 1           

[facility]



     300 mg oral      5-10                               tab, PO,           l



     tablet      13:59:                               Daily, #           [place]



               00                                 90 tab, 1           



                                                  Refill(s),           



                                                  Pharmacy:           



                                                  [facility]           



                                                  [facility]           



                                                  [id number]           

 

     allopurinol      2018-0      No                       300 mg = 1           

[facility]



     300 mg oral      5-10                               tab, PO,           l



     tablet      13:59:                               Daily, #           [place]



               00                                 90 tab, 1           



                                                  Refill(s),           



                                                  Pharmacy:           



                                                  [place] [id number]           

 

     allopurinol            No                       300 mg = 1           

[facility]



     300 mg oral      5-10                               tab, PO,           l



     tablet      13:59:                               Daily, #           [place]



               00                                 90 tab, 1           



                                                  Refill(s),           



                                                  Pharmacy:           



                                                  [facility]           



                                                  [facility]           



                                                  [id number]           

 

     lisinopril            No                       See            [facility]



     5 mg oral      5-01                               Instructio           l



     tablet      12:38:                               ns, # 90           [place]



               18                                 tab, TAKE           



                                                  1 TABLET           



                                                  BY MOUTH           



                                                  DAILY,           



                                                  Pharmacy:           



                                                  [place] [id number]           

 

     ALLOPURINOL            Yes                      See            [facility]

a



     300MG      5-01                               Instructio           l



     TABLETS      12:38:                               ns, # 90           [place]

n



               18                                 tab, TAKE           



                                                  1 TABLET           



                                                  BY MOUTH           



                                                  DAILY,           



                                                  Pharmacy:           



                                                  [facility]           



                                                  [facility]           



                                                  [id number]           

 

     lisinopril            No                       See            [facility]



     5 mg oral      5-01                               Instructio           l



     tablet      12:38:                               ns, # 90           [place]



               18                                 tab, TAKE           



                                                  1 TABLET           



                                                  BY MOUTH           



                                                  DAILY,           



                                                  Pharmacy:           



                                                  [facility]           



                                                  [facility]           



                                                  [id number]           

 

     ALLOPURINOL            Yes                      See            [facility]

a



     300MG      5-01                               Instructio           l



     TABLETS      12:38:                               ns, # 90           [place]

n



               18                                 tab, TAKE           



                                                  1 TABLET           



                                                  BY MOUTH           



                                                  DAILY,           



                                                  Pharmacy:           



                                                  [facility]           



                                                  [facility]           



                                                  [id number]           

 

     lisinopril            No                       See            [facility]



     5 mg oral      5-01                               Instructio           l



     tablet      12:38:                               ns, # 90           [place]



               18                                 tab, TAKE           



                                                  1 TABLET           



                                                  BY MOUTH           



                                                  DAILY,           



                                                  Pharmacy:           



                                                  [facility]           



                                                  [facility]           



                                                  [id number]           

 

     ALLOPURINOL            Yes                      See            [facility]

a



     300MG      5-01                               Instructio           l



     TABLETS      12:38:                               ns, # 90           [place]

n



               18                                 tab, TAKE           



                                                  1 TABLET           



                                                  BY MOUTH           



                                                  DAILY,           



                                                  Pharmacy:           



                                                  [facility]           



                                                  [facility]           



                                                  [id number]           

 

     lisinopril            No                       See            [facility]



     5 mg oral      5-01                               Instructio           l



     tablet      12:38:                               ns, # 90           [place]



               18                                 tab, TAKE           



                                                  1 TABLET           



                                                  BY MOUTH           



                                                  DAILY,           



                                                  Pharmacy:           



                                                  [facility]           



                                                  [facility]           



                                                  [id number]           

 

     ALLOPURINOL      [address]      Yes                      See            [facility]

a



     300MG      5-01                               Instructio           l



     TABLETS      12:38:                               ns, # 90           [place]

n



               18                                 tab, TAKE           



                                                  1 TABLET           



                                                  BY MOUTH           



                                                  DAILY,           



                                                  Pharmacy:           



                                                  [facility]           



                                                  [facility]           



                                                  [id number]           

 

     lisinopril            No                       See            [facility]



     5 mg oral      5-01                               Instructio           l



     tablet      12:38:                               ns, # 90           [place]



               18                                 tab, TAKE           



                                                  1 TABLET           



                                                  BY MOUTH           



                                                  DAILY,           



                                                  Pharmacy:           



                                                  [place] [address]           

 

     ALLOPURINOL            Yes                      See            [facility]

a



     300MG      5-01                               Instructio           l



     TABLETS      12:38:                               ns, # 90           [place]

n



               18                                 tab, TAKE           



                                                  1 TABLET           



                                                  BY MOUTH           



                                                  DAILY,           



                                                  Pharmacy:           



                                                  [facility]           



                                                  [facility]           



                                                  [id number]           

 

     lisinopril            No                       See            [facility]



     5 mg oral      5-01                               Instructio           l



     tablet      12:38:                               ns, # 90           [place]



               18                                 tab, TAKE           



                                                  1 TABLET           



                                                  BY MOUTH           



                                                  DAILY,           



                                                  Pharmacy:           



                                                  [facility]           



                                                  [facility]           



                                                  [id number]           

 

     ALLOPURINOL            Yes                      See            [facility]

a



     300MG      5-01                               Instructio           l



     TABLETS      12:38:                               ns, # 90           [place]

n



               18                                 tab, TAKE           



                                                  1 TABLET           



                                                  BY MOUTH           



                                                  DAILY,           



                                                  Pharmacy:           



                                                  [facility]           



                                                  [facility]           



                                                  [id number]           

 

     lisinopril            No                       See            [facility]



     5 mg oral      5-01                               Instructio           l



     tablet      12:38:                               ns, # 90           [place]



               18                                 tab, TAKE           



                                                  1 TABLET           



                                                  BY MOUTH           



                                                  DAILY,           



                                                  Pharmacy:           



                                                  [facility]           



                                                  [facility]           



                                                  [id number]           

 

     ALLOPURINOL            Yes                      See            [facility]

a



     300MG      5-01                               Instructio           l



     TABLETS      12:38:                               ns, # 90           [place]

n



               18                                 tab, TAKE           



                                                  1 TABLET           



                                                  BY MOUTH           



                                                  DAILY,           



                                                  Pharmacy:           



                                                  [facility]           



                                                  [facility]           



                                                  [id number]           

 

     calcitriol            Yes                      0.25           [facility]



     0.25 mcg      3-28                               microgram           l



     oral      13:49:                               = 1 cap,           [place]



     capsule      00                                 PO, Daily,           



                                                  # 90 cap,           



                                                  3              



                                                  Refill(s),           



                                                  Pharmacy:           



                                                  [facility]           



                                                  [facility]           



                                                  [id number]           

 

     calcitriol            Yes                      0.25           [facility]



     0.25 mcg      3-28                               microgram           l



     oral      13:49:                               = 1 cap,           [place]



     capsule      00                                 PO, Daily,           



                                                  # 90 cap,           



                                                  3              



                                                  Refill(s),           



                                                  Pharmacy:           



                                                  [facility]           



                                                  [facility]           



                                                  [id number]           

 

     calcitriol            Yes                      0.25           [facility]



     0.25 mcg      3-28                               microgram           l



     oral      13:49:                               = 1 cap,           [place]



     capsule      00                                 PO, Daily,           



                                                  # 90 cap,           



                                                  3              



                                                  Refill(s),           



                                                  Pharmacy:           



                                                  [facility]           



                                                  [facility]           



                                                  [id number]           

 

     calcitriol            Yes                      0.25           [facility]



     0.25 mcg      3-28                               microgram           l



     oral      13:49:                               = 1 cap,           [place]



     capsule      00                                 PO, Daily,           



                                                  # 90 cap,           



                                                  3              



                                                  Refill(s),           



                                                  Pharmacy:           



                                                  [facility]           



                                                  [facility]           



                                                  [id number]           

 

     calcitriol            Yes                      0.25           [facility]



     0.25 mcg      3-28                               microgram           l



     oral      13:49:                               = 1 cap,           [place]



     capsule      00                                 PO, Daily,           



                                                  # 90 cap,           



                                                  3              



                                                  Refill(s),           



                                                  Pharmacy:           



                                                  [facility]           



                                                  [facility]           



                                                  [id number]           

 

     calcitriol            Yes                      0.25           [facility]



     0.25 mcg      3-28                               microgram           l



     oral      13:49:                               = 1 cap,           [place]



     capsule      00                                 PO, Daily,           



                                                  # 90 cap,           



                                                  3              



                                                  Refill(s),           



                                                  Pharmacy:           



                                                  [facility]           



                                                  [facility]           



                                                  [id number]           

 

     calcitriol            Yes                      0.25           [facility]



     0.25 mcg      3-28                               microgram           l



     oral      13:49:                               = 1 cap,           [place]



     capsule      00                                 PO, Daily,           



                                                  # 90 cap,           



                                                  3              



                                                  Refill(s),           



                                                  Pharmacy:           



                                                  [facility]           



                                                  [facility]           



                                                  [id number]           

 

     Mupirocin            Yes                      1 appl,           [facility]

ia



     0.02 MG/MG      3-21                               TOP, BID,           l



     Topical      15:37:                               30 grams           [facility]

n



     Ointment      21                                 3each, # 3           



                                                  ea, 1           



                                                  Refill(s),           



                                                  Pharmacy:           



                                                  [facility]           



                                                  [facility]           



                                                  [id number]           

 

     Mupirocin            Yes                      1 appl,           [facility]

ia



     0.02 MG/MG      3-21                               TOP, BID,           l



     Topical      15:37:                               30 grams           [facility]

n



     Ointment      21                                 3each, # 3           



                                                  ea, 1           



                                                  Refill(s),           



                                                  Pharmacy:           



                                                  [facility]           



                                                  [facility]           



                                                  [id number]           

 

     Mupirocin            Yes                      1 appl,           [facility]

ia



     0.02 MG/MG      3-21                               TOP, BID,           l



     Topical      15:37:                               30 grams           [facility]

n



     Ointment      21                                 3each, # 3           



                                                  ea, 1           



                                                  Refill(s),           



                                                  Pharmacy:           



                                                  [facility]           



                                                  [facility]           



                                                  [id number]           

 

     Mupirocin            Yes                      1 appl,           [facility]

ia



     0.02 MG/MG      3-21                               TOP, BID,           l



     Topical      15:37:                               30 grams           [facility]

n



     Ointment      21                                 3each, # 3           



                                                  ea, 1           



                                                  Refill(s),           



                                                  Pharmacy:           



                                                  [facility]           



                                                  [facility]           



                                                  [id number]           

 

     Mupirocin            Yes                      1 appl,           [facility]

ia



     0.02 MG/MG      3-21                               TOP, BID,           l



     Topical      15:37:                               30 grams           [facility]

n



     Ointment      21                                 3each, # 3           



                                                  ea, 1           



                                                  Refill(s),           



                                                  Pharmacy:           



                                                  [place] [address]           

 

     Mupirocin            Yes                      1 appl,           [facility]

ia



     0.02 MG/MG      3-21                               TOP, BID,           l



     Topical      15:37:                               30 grams           [facility]

n



     Ointment      21                                 3each, # 3           



                                                  ea, 1           



                                                  Refill(s),           



                                                  Pharmacy:           



                                                  [place] [address]           

 

     Mupirocin            Yes                      1 appl,           [facility]

ia



     0.02 MG/MG      3-21                               TOP, BID,           l



     Topical      15:37:                               30 grams           [facility]

n



     Ointment      21                                 3each, # 3           



                                                  ea, 1           



                                                  Refill(s),           



                                                  Pharmacy:           



                                                  [address]            Yes                      See            [facility]

a



     n 40 mg      3-21                               Instructio           l



     oral tablet      15:36:                               ns, TAKE 1           

[place]



               22                                 TABLET BY           



                                                  MOUTH           



                                                  EVERY           



                                                  NIGHT AT           



                                                  BEDTIME, #           



                                                  30 tab, 5           



                                                  Refill(s),           



                                                  Pharmacy:           



                                                  [address]            Yes                      See            [facility]

a



     n 40 mg      3-21                               Instructio           l



     oral tablet      15:36:                               ns, TAKE 1           

[place]



               22                                 TABLET BY           



                                                  MOUTH           



                                                  EVERY           



                                                  NIGHT AT           



                                                  BEDTIME, #           



                                                  30 tab, 5           



                                                  Refill(s),           



                                                  Pharmacy:           



                                                  [address]            Yes                      See            [facility]

a



     n 40 mg      3-21                               Instructio           l



     oral tablet      15:36:                               ns, TAKE 1           

[place]



               22                                 TABLET BY           



                                                  MOUTH           



                                                  EVERY           



                                                  NIGHT AT           



                                                  BEDTIME, #           



                                                  30 tab, 5           



                                                  Refill(s),           



                                                  Pharmacy:           



                                                  [address]            Yes                      See            [facility]

a



     n 40 mg      3-21                               Instructio           l



     oral tablet      15:36:                               ns, TAKE 1           

[place]



               22                                 TABLET BY           



                                                  MOUTH           



                                                  EVERY           



                                                  NIGHT AT           



                                                  BEDTIME, #           



                                                  30 tab, 5           



                                                  Refill(s),           



                                                  Pharmacy:           



                                                  [address]            Yes                      See            [facility]

a



     n 40 mg      3-21                               Instructio           l



     oral tablet      15:36:                               ns, TAKE 1           

[place]



               22                                 TABLET BY           



                                                  MOUTH           



                                                  EVERY           



                                                  NIGHT AT           



                                                  BEDTIME, #           



                                                  30 tab, 5           



                                                  Refill(s),           



                                                  Pharmacy:           



                                                  [address]            Yes                      See            [facility]

a



     n 40 mg      3-21                               Instructio           l



     oral tablet      15:36:                               ns, TAKE 1           

[place]



               22                                 TABLET BY           



                                                  MOUTH           



                                                  EVERY           



                                                  NIGHT AT           



                                                  BEDTIME, #           



                                                  30 tab, 5           



                                                  Refill(s),           



                                                  Pharmacy:           



                                                  [place] [address]           

 

     atorvastati            Yes                      See            [facility]

a



     n 40 mg      3-21                               Instructio           l



     oral tablet      15:36:                               ns, TAKE 1           

[place]



               22                                 TABLET BY           



                                                  MOUTH           



                                                  EVERY           



                                                  NIGHT AT           



                                                  BEDTIME, #           



                                                  30 tab, 5           



                                                  Refill(s),           



                                                  Pharmacy:           



                                                  [place] [address]           

 

     amLODIPine            Yes                      See            [facility]



     5 mg oral      3-21                               Instructio           l



     tablet      15:36:                               ns, TAKE 1           [facility]

nn



               18                                 TABLET BY           



                                                  MOUTH           



                                                  EVERY DAY,           



                                                  # 30 tab,           



                                                  5              



                                                  Refill(s),           



                                                  Pharmacy:           



                                                  [place] [address]           

 

     amLODIPine            Yes                      See            [facility]



     5 mg oral      3-21                               Instructio           l



     tablet      15:36:                               ns, TAKE 1           [facility]

nn



               18                                 TABLET BY           



                                                  MOUTH           



                                                  EVERY DAY,           



                                                  # 30 tab,           



                                                  5              



                                                  Refill(s),           



                                                  Pharmacy:           



                                                  [place] [address]           

 

     amLODIPine            Yes                      See            [facility]



     5 mg oral      3-21                               Instructio           l



     tablet      15:36:                               ns, TAKE 1           [facility]

nn



               18                                 TABLET BY           



                                                  MOUTH           



                                                  EVERY DAY,           



                                                  # 30 tab,           



                                                  5              



                                                  Refill(s),           



                                                  Pharmacy:           



                                                  [place] [address]           

 

     amLODIPine            Yes                      See            [facility]



     5 mg oral      3-21                               Instructio           l



     tablet      15:36:                               ns, TAKE 1           [facility]

nn



               18                                 TABLET BY           



                                                  MOUTH           



                                                  EVERY DAY,           



                                                  # 30 tab,           



                                                  5              



                                                  Refill(s),           



                                                  Pharmacy:           



                                                  [place] [address]           

 

     amLODIPine            Yes                      See            [facility]



     5 mg oral      3-21                               Instructio           l



     tablet      15:36:                               ns, TAKE 1           [facility]

nn



               18                                 TABLET BY           



                                                  MOUTH           



                                                  EVERY DAY,           



                                                  # 30 tab,           



                                                  5              



                                                  Refill(s),           



                                                  Pharmacy:           



                                                  [place] [address]           

 

     amLODIPine            Yes                      See            [facility]



     5 mg oral      3-21                               Instructio           l



     tablet      15:36:                               ns, TAKE 1           [facility]

nn



               18                                 TABLET BY           



                                                  MOUTH           



                                                  EVERY DAY,           



                                                  # 30 tab,           



                                                  5              



                                                  Refill(s),           



                                                  Pharmacy:           



                                                  [place] [address]           

 

     amLODIPine            Yes                      See            [facility]



     5 mg oral      3-21                               Instructio           l



     tablet      15:36:                               ns, TAKE 1           [facility]

nn



               18                                 TABLET BY           



                                                  MOUTH           



                                                  EVERY DAY,           



                                                  # 30 tab,           



                                                  5              



                                                  Refill(s),           



                                                  Pharmacy:           



                                                  [facility]           



                                                  [facility]           



                                                  [id number]           

 

     aspirin 81      2017-      Yes                      81 mg = 1           [facility]

[facility]



     mg tablet,      1-24                               tab, PO,           l



     enteric      16:34:                               Daily, #           [facility]

n



     coated      00                                 90 tab, 3           



                                                  Refill(s)           

 

     aspirin 81      2017      Yes                      81 mg = 1           [facility]

[facility]



     mg tablet,      1-24                               tab, PO,           l



     enteric      16:34:                               Daily, #           [facility]

n



     coated      00                                 90 tab, 3           



                                                  Refill(s)           

 

     aspirin 81      2017      Yes                      81 mg = 1           [facility]

[facility]



     mg tablet,      1-24                               tab, PO,           l



     enteric      16:34:                               Daily, #           [facility]

n



     coated      00                                 90 tab, 3           



                                                  Refill(s)           

 

     aspirin 81      2017-      Yes                      81 mg = 1           [facility]

[facility]



     mg tablet,      1-24                               tab, PO,           l



     enteric      16:34:                               Daily, #           [facility]

n



     coated      00                                 90 tab, 3           



                                                  Refill(s)           

 

     aspirin 81            Yes                      81 mg = 1           [facility]

[facility]



     mg tablet,      1-24                               tab, PO,           l



     enteric      16:34:                               Daily, #           [facility]

n



     coated      00                                 90 tab, 3           



                                                  Refill(s)           

 

     aspirin 81      2017      Yes                      81 mg = 1           [facility]

[facility]



     mg tablet,      1-24                               tab, PO,           l



     enteric      16:34:                               Daily, #           [facility]

n



     coated      00                                 90 tab, 3           



                                                  Refill(s)           

 

     aspirin 81      2017-      Yes                      81 mg = 1           [facility]

[facility]



     mg tablet,      1-24                               tab, PO,           l



     enteric      16:34:                               Daily, #           [facility]

n



     coated      00                                 90 tab, 3           



                                                  Refill(s)           

 

     Xopenex      0      No   Ghassan K                0.63 mg =           [facility]

[facility]



     0.63 mg/3      3-11           [facility]                3 mL,           l



     mL        01:00:                               Soln, NEB,           [place]



     inhalation      00                                 Once,           



     solution                                         first dose           



                                                  03/10/16           



                                                  19:00:00           



                                                  CST, stop           



                                                  date           



                                                  03/10/16           



                                                  19:00:00           



                                                  CST            

 

     Xopenex      -0      No   Ghassan K                0.63 mg =           [facility]

[facility]



     0.63 mg/3      3-11           [facility]                3 mL,           l



     mL        01:00:                               Soln, NEB,           [facility]



     inhalation      00                                 Once,           



     solution                                         first dose           



                                                  03/10/16           



                                                  19:00:00           



                                                  CST, stop           



                                                  date           



                                                  03/10/16           



                                                  19:00:00           



                                                  CST            

 

     Xopenex      -0      No   Ghassan K                0.63 mg =           [facility]

[facility]



     0.63 mg/3      3-11           [place]                3 mL,           l



     mL        01:00:                               Soln, NEB,           [facility]



     inhalation      00                                 Once,           



     solution                                         first dose           



                                                  03/10/16           



                                                  19:00:00           



                                                  CST, stop           



                                                  date           



                                                  03/10/16           



                                                  19:00:00           



                                                  CST            

 

     Xopenex      -0      No   Ghassan K                0.63 mg =           [facility]

[facility]



     0.63 mg/3      3-11           [facility]                3 mL,           l



     mL        01:00:                               Soln, NEB,           [place]



     inhalation      00                                 Once,           



     solution                                         first dose           



                                                  03/10/16           



                                                  19:00:00           



                                                  CST, stop           



                                                  date           



                                                  03/10/16           



                                                  19:00:00           



                                                  CST            

 

     Xopenex      -0      No   Ghassan K                0.63 mg =           [facility]

[facility]



     0.63 mg/3      3-11           Chun                3 mL,           l



     mL        01:00:                               Soln, NEB,           [place]



     inhalation      00                                 Once,           



     solution                                         first dose           



                                                  03/10/16           



                                                  19:00:00           



                                                  CST, stop           



                                                  date           



                                                  03/10/16           



                                                  19:00:00           



                                                  CST            

 

     Xopenex      -0      No   Ghassan K                0.63 mg =           [facility]

[facility]



     0.63 mg/3      3-11           Chun                3 mL,           l



     mL        01:00:                               Soln, NEB,           [place]



     inhalation      00                                 Once,           



     solution                                         first dose           



                                                  03/10/16           



                                                  19:00:00           



                                                  CST, stop           



                                                  date           



                                                  03/10/16           



                                                  19:00:00           



                                                  CST            

 

     Xopenex      -0      No   Ghassan K                0.63 mg =           [facility]

[facility]



     0.63 mg/3      3-11           Chun                3 mL,           l



     mL        01:00:                               Soln, NEB,           [place]



     inhalation      00                                 Once,           



     solution                                         first dose           



                                                  03/10/16           



                                                  19:00:00           



                                                  CST, stop           



                                                  date           



                                                  03/10/16           



                                                  19:00:00           



                                                  CST            

 

     Misc      -0      No   Deuce                300 mL,           [facility]



     Medication      3-11           Wheat                Soln-IV,           l



               00:03:                               IV, Once,           [place]



               00                                 first dose           



                                                  03/10/16           



                                                  18:03:00           



                                                  CST, stop           



                                                  date           



                                                  03/10/16           



                                                  18:03:00           



                                                  CST            

 

     Misc      -0      No   Deuce                300 mL,           [facility]



     Medication      3-11           Wheat                Soln-IV,           l



               00:03:                               IV, Once,           [place]



               00                                 first dose           



                                                  03/10/16           



                                                  18:03:00           



                                                  CST, stop           



                                                  date           



                                                  03/10/16           



                                                  18:03:00           



                                                  CST            

 

     Misc            No   Deuce                300 mL,           [facility]



     Medication      3-11           Wheat                Soln-IV,           l



               00:03:                               IV, Once,           [place]                                 first dose           



                                                  03/10/16           



                                                  18:03:00           



                                                  CST, stop           



                                                  date           



                                                  03/10/16           



                                                  18:03:00           



                                                  CST            

 

     Misc            No   Deuce                300 mL,           [facility]



     Medication      3-11           Wheat                Soln-IV,           l



               00:03:                               IV, Once,           [place]                                 first dose           



                                                  03/10/16           



                                                  18:03:00           



                                                  CST, stop           



                                                  date           



                                                  03/10/16           



                                                  18:03:00           



                                                  CST            

 

     Misc            No   Deuce                300 mL,           [facility]



     Medication      3-11           [place]heat                Soln-IV,           l



               00:03:                               IV, Once,           [place]                                 first dose           



                                                  03/10/16           



                                                  18:03:00           



                                                  CST, stop           



                                                  date           



                                                  03/10/16           



                                                  18:03:00           



                                                  CST            

 

     Misc            No   Deuce                300 mL,           [facility]



     Medication      3-11           [place]heat                Soln-IV,           l



               00:03:                               IV, Once,           [place]                                 first dose           



                                                  03/10/16           



                                                  18:03:00           



                                                  CST, stop           



                                                  date           



                                                  03/10/16           



                                                  18:03:00           



                                                  CST            

 

     Misc            No   Deuce                300 mL,           [facility]



     Medication      3-11           [place]heat                Soln-IV,           l



               00:03:                               IV, Once,           [place]                                 first dose           



                                                  03/10/16           



                                                  18:03:00           



                                                  CST, stop           



                                                  date           



                                                  03/10/16           



                                                  18:03:00           



                                                  CST            

 

     promethazin            No   Ghassan K                12.5 mg =          

 [facility]



     e         3-11           Chun                0.5 mL,           l



               00:02:                               Injection,           [place]



               00                                 IM, Once           



                                                  PRN for           



                                                  severe           



                                                  nausea,           



                                                  first dose           



                                                  03/10/16           



                                                  18:02:00           



                                                  CST            

 

     albuterol            No   Ghassan K                2.5 mg = 3           

[facility]



     2.5 mg/3 mL      3-11           Chun                mL, Soln,           

l



     (0.083%)      00:02:                               NEB, Once           [facility]

d[facility]



     inhalation      00                                 PRN for           



     solution                                         wheezing,           



                                                  first dose           



                                                  03/10/16           



                                                  18:02:00           



                                                  CST            

 

     Demerol HCl            No   Ghassan K                12.5 mg =          

 [facility]



               3-11           Chun                0.25 mL,           l



               00:02:                               Injection,           [place]



               00                                 IV Push,           



                                                  Once PRN           



                                                  for            



                                                  shivers,           



                                                  first dose           



                                                  03/10/16           



                                                  18:02:00           



                                                  CST            

 

     ondansetron            No   Ghassan K                4 mg = 2           

[facility]



               3-11           Chun                mL,            l



               00:02:                               Injection,           [place]



               00                                 IV Push,           



                                                  q15min PRN           



                                                  for            



                                                  nausea/vom           



                                                  iting,           



                                                  order           



                                                  duration:           



                                                  2 doses,           



                                                  first dose           



                                                  03/10/16           



                                                  18:02:00           



                                                  CST, stop           



                                                  date           



                                                  Limited #           



                                                  of times           

 

     Dilaudid            No   Ghassan K                0.5 mg =           [facility]

[facility]



               3-11           Chun                0.25 mL,           l



               00:02:                               Injection,           [place]



               00                                 IV Push,           



                                                  q10min PRN           



                                                  for pain           



                                                  severe           



                                                  (7-10),           



                                                  first dose           



                                                  03/10/16           



                                                  18:02:00           



                                                  CST            

 

     diphenhydrA            No   Ghassan K                25 mg =           [facility]

[facility]



     MINE      3-11           Chun                0.5 mL,           l



               00:02:                               Injection,           [place]



               00                                 IV Push,           



                                                  Once PRN           



                                                  for            



                                                  itching,           



                                                  first dose           



                                                  03/10/16           



                                                  18:02:00           



                                                  CST            

 

     LR 1,000 mL            No   Ghassan K                1,000 mL,          

 [facility]



               3-11           Chun                IV, 75           l



               00:02:                               mL/hr,           [place]



               00                                 start date           



                                                  03/10/16           



                                                  18:02:00           



                                                  CST            

 

     Saline Lock            No   Ghassan K                10 mL,           [facility]

[facility]



     Flush      3-11           Chun                Soln, IV           l



               00:02:                               Push, As           [place]



               00                                 Indicated           



                                                  PRN for           



                                                  flush,           



                                                  first dose           



                                                  03/10/16           



                                                  18:02:00           



                                                  CST            

 

     Bupivacaine            No   Ghassan K                300 mL,           [facility]

[facility]



     0.25% 300      3-11           Chun                Nerve           l



     mL pump 300      00:02:                               Block, 5           [facility]

[facility]



     mL        00                                 mL/hr,           



                                                  start date           



                                                  03/10/16           



                                                  18:02:00           



                                                  CST            

 

     promethazin            No   Ghassan K                12.5 mg =          

 [facility]



     e         3-11           Chun                0.5 mL,           l



               00:02:                               Injection,           [place]



               00                                 IM, Once           



                                                  PRN for           



                                                  severe           



                                                  nausea,           



                                                  first dose           



                                                  03/10/16           



                                                  18:02:00           



                                                  CST            

 

     albuterol            No   Ghassan K                2.5 mg = 3           

[facility]



     2.5 mg/3 mL      3-11           Chun                mL, Soln,           

l



     (0.083%)      00:02:                               NEB, Once           [facility]

[facility]



     inhalation      00                                 PRN for           



     solution                                         wheezing,           



                                                  first dose           



                                                  03/10/16           



                                                  18:02:00           



                                                  CST            

 

     Demerol HCl            No   Ghassan K                12.5 mg =          

 [facility]



               3-11           Chun                0.25 mL,           l



               00:02:                               Injection,           [place]



               00                                 IV Push,           



                                                  Once PRN           



                                                  for            



                                                  shivers,           



                                                  first dose           



                                                  03/10/16           



                                                  18:02:00           



                                                  CST            

 

     ondansetron            No   Ghassan K                4 mg = 2           

[facility]



               3-11           Chun                mL,            l



               00:02:                               Injection,           [place]



               00                                 IV Push,           



                                                  q15min PRN           



                                                  for            



                                                  nausea/vom           



                                                  iting,           



                                                  order           



                                                  duration:           



                                                  2 doses,           



                                                  first dose           



                                                  03/10/16           



                                                  18:02:00           



                                                  CST, stop           



                                                  date           



                                                  Limited #           



                                                  of times           

 

     Dilaudid            No   Ghassan K                0.5 mg =           [facility]

[facility]



               3-11           Chun                0.25 mL,           l



               00:02:                               Injection,           [place]



               00                                 IV Push,           



                                                  q10min PRN           



                                                  for pain           



                                                  severe           



                                                  (7-10),           



                                                  first dose           



                                                  03/10/16           



                                                  18:02:00           



                                                  CST            

 

     diphenhydrA            No   Ghassan K                25 mg =           [facility]

[facility]



     MINE      3-11           Chun                0.5 mL,           l



               00:02:                               Injection,           [place]



               00                                 IV Push,           



                                                  Once PRN           



                                                  for            



                                                  itching,           



                                                  first dose           



                                                  03/10/16           



                                                  18:02:00           



                                                  CST            

 

     LR 1,000 mL            No   Ghassan K                1,000 mL,          

 [facility]



               3-11           Chun                IV, 75           l



               00:02:                               mL/hr,           [place]



               00                                 start date           



                                                  03/10/16           



                                                  18:02:00           



                                                  CST            

 

     Saline Lock            No   Ghassan K                10 mL,           Me

[facility]



     Flush      3-11           Chun                Soln, IV           l



               00:02:                               Push, As           [facility]



               00                                 Indicated           



                                                  PRN for           



                                                  flush,           



                                                  first dose           



                                                  03/10/16           



                                                  18:02:00           



                                                  CST            

 

     Bupivacaine            No   Ghassan K                300 mL,           [facility]

[facility]



     0.25% 300      3-11           Chun                Nerve           l



     mL pump 300      00:02:                               Block, 5           [facility]

[facility]



     mL        00                                 mL/hr,           



                                                  start date           



                                                  03/10/16           



                                                  18:02:00           



                                                  CST            

 

     promethazin            No   Ghassan K                12.5 mg =          

 [facility]



     e         3-11           Chun                0.5 mL,           l



               00:02:                               Injection,           [place]



               00                                 IM, Once           



                                                  PRN for           



                                                  severe           



                                                  nausea,           



                                                  first dose           



                                                  03/10/16           



                                                  18:02:00           



                                                  CST            

 

     albuterol            No   Ghassan K                2.5 mg = 3           

[facility]



     2.5 mg/3 mL      3-11           Chun                mL, Soln,           

l



     (0.083%)      00:02:                               NEB, Once           [facility]

[facility]



     inhalation      00                                 PRN for           



     solution                                         wheezing,           



                                                  first dose           



                                                  03/10/16           



                                                  18:02:00           



                                                  CST            

 

     Demerol HCl            No   Ghassan K                12.5 mg =          

 [facility]



               3-11           Chun                0.25 mL,           l



               00:02:                               Injection,           [place]



               00                                 IV Push,           



                                                  Once PRN           



                                                  for            



                                                  shivers,           



                                                  first dose           



                                                  03/10/16           



                                                  18:02:00           



                                                  CST            

 

     ondansetron            No   Ghassan K                4 mg = 2           

[facility]



               3-11           Chun                mL,            l



               00:02:                               Injection,           [place]



               00                                 IV Push,           



                                                  q15min PRN           



                                                  for            



                                                  nausea/vom           



                                                  iting,           



                                                  order           



                                                  duration:           



                                                  2 doses,           



                                                  first dose           



                                                  03/10/16           



                                                  18:02:00           



                                                  CST, stop           



                                                  date           



                                                  Limited #           



                                                  of times           

 

     Dilaudid            No   Ghassan K                0.5 mg =           [facility]

[facility]



               3-11           Chun                0.25 mL,           l



               00:02:                               Injection,           [place]



               00                                 IV Push,           



                                                  q10min PRN           



                                                  for pain           



                                                  severe           



                                                  (7-10),           



                                                  first dose           



                                                  03/10/16           



                                                  18:02:00           



                                                  CST            

 

     diphenhydrA            No   Ghassan K                25 mg =           M

[facility]



     MINE      3-11           Chun                0.5 mL,           l



               00:02:                               Injection,           [place]



               00                                 IV Push,           



                                                  Once PRN           



                                                  for            



                                                  itching,           



                                                  first dose           



                                                  03/10/16           



                                                  18:02:00           



                                                  CST            

 

     LR 1,000 mL            No   Ghassan K                1,000 mL,          

 [facility]



               3-11           Chun                IV, 75           l



               00:02:                               mL/hr,           [place]



               00                                 start date           



                                                  03/10/16           



                                                  18:02:00           



                                                  CST            

 

     Saline Lock            No   Ghassan K                10 mL,           Me

[facility]



     Flush      3-11           Chun                Soln, IV           l



               00:02:                               Push, As           [place]



               00                                 Indicated           



                                                  PRN for           



                                                  flush,           



                                                  first dose           



                                                  03/10/16           



                                                  18:02:00           



                                                  CST            

 

     Bupivacaine            No   Ghassan K                300 mL,           M

[facility]



     0.25% 300      3-11           Chun                Nerve           l



     mL pump 300      00:02:                               Block, 5           He

[facility]



     mL        00                                 mL/hr,           



                                                  start date           



                                                  03/10/16           



                                                  18:02:00           



                                                  CST            

 

     promethazin            No   Ghassan K                12.5 mg =          

 [facility]



     e         3-11           Chun                0.5 mL,           l



               00:02:                               Injection,           [place]



               00                                 IM, Once           



                                                  PRN for           



                                                  severe           



                                                  nausea,           



                                                  first dose           



                                                  03/10/16           



                                                  18:02:00           



                                                  CST            

 

     albuterol            No   Ghassan K                2.5 mg = 3           

[facility]



     2.5 mg/3 mL      3-11           Chun                mL, Soln,           

l



     (0.083%)      00:02:                               NEB, Once           [facility]

d[facility]



     inhalation      00                                 PRN for           



     solution                                         wheezing,           



                                                  first dose           



                                                  03/10/16           



                                                  18:02:00           



                                                  CST            

 

     Demerol HCl            No   Ghassan K                12.5 mg =          

 [facility]



               3-11           Chun                0.25 mL,           l



               00:02:                               Injection,           [place]



               00                                 IV Push,           



                                                  Once PRN           



                                                  for            



                                                  shivers,           



                                                  first dose           



                                                  03/10/16           



                                                  18:02:00           



                                                  CST            

 

     ondansetron            No   Ghassan K                4 mg = 2           

[facility]



               3-11           Chun                mL,            l



               00:02:                               Injection,           [place]



               00                                 IV Push,           



                                                  q15min PRN           



                                                  for            



                                                  nausea/vom           



                                                  iting,           



                                                  order           



                                                  duration:           



                                                  2 doses,           



                                                  first dose           



                                                  03/10/16           



                                                  18:02:00           



                                                  CST, stop           



                                                  date           



                                                  Limited #           



                                                  of times           

 

     Dilaudid            No   Ghassan K                0.5 mg =           [facility]

[facility]



               3-11           Chun                0.25 mL,           l



               00:02:                               Injection,           [place]



               00                                 IV Push,           



                                                  q10min PRN           



                                                  for pain           



                                                  severe           



                                                  (7-10),           



                                                  first dose           



                                                  03/10/16           



                                                  18:02:00           



                                                  CST            

 

     diphenhydrA            No   Ghassan K                25 mg =           M

[facility]



     MINE      3-11           Chun                0.5 mL,           l



               00:02:                               Injection,           [place]



               00                                 IV Push,           



                                                  Once PRN           



                                                  for            



                                                  itching,           



                                                  first dose           



                                                  03/10/16           



                                                  18:02:00           



                                                  CST            

 

     LR 1,000 mL            No   Ghassan K                1,000 mL,          

 [facility]



               3-11           Chun                IV, 75           l



               00:02:                               mL/hr,           [place]



               00                                 start date           



                                                  03/10/16           



                                                  18:02:00           



                                                  CST            

 

     Saline Lock            No   Ghassan K                10 mL,           Me

[facility]



     Flush      3-11           Chun                Soln, IV           l



               00:02:                               Push, As           [place]



               00                                 Indicated           



                                                  PRN for           



                                                  flush,           



                                                  first dose           



                                                  03/10/16           



                                                  18:02:00           



                                                  CST            

 

     Bupivacaine            No   Ghassan K                300 mL,           M

[facility]



     0.25% 300      3-11           Chun                Nerve           l



     mL pump 300      00:02:                               Block, 5           He

[facility]



     mL        00                                 mL/hr,           



                                                  start date           



                                                  03/10/16           



                                                  18:02:00           



                                                  CST            

 

     promethazin            No   Ghassan K                12.5 mg =          

 [facility]



     e         3-11           Chun                0.5 mL,           l



               00:02:                               Injection,           [place]



               00                                 IM, Once           



                                                  PRN for           



                                                  severe           



                                                  nausea,           



                                                  first dose           



                                                  03/10/16           



                                                  18:02:00           



                                                  CST            

 

     albuterol            No   Ghassan K                2.5 mg = 3           

[facility]



     2.5 mg/3 mL      3-11           Chun                mL, Soln,           

l



     (0.083%)      00:02:                               NEB, Once           [facility]

d[facility]



     inhalation      00                                 PRN for           



     solution                                         wheezing,           



                                                  first dose           



                                                  03/10/16           



                                                  18:02:00           



                                                  CST            

 

     Demerol HCl            No   Ghassan K                12.5 mg =          

 [facility]



               3-11           Chun                0.25 mL,           l



               00:02:                               Injection,           [place]



               00                                 IV Push,           



                                                  Once PRN           



                                                  for            



                                                  shivers,           



                                                  first dose           



                                                  03/10/16           



                                                  18:02:00           



                                                  CST            

 

     ondansetron            No   Ghassan K                4 mg = 2           

[facility]



               3-11           Chun                mL,            l



               00:02:                               Injection,           [place]



               00                                 IV Push,           



                                                  q15min PRN           



                                                  for            



                                                  nausea/vom           



                                                  iting,           



                                                  order           



                                                  duration:           



                                                  2 doses,           



                                                  first dose           



                                                  03/10/16           



                                                  18:02:00           



                                                  CST, stop           



                                                  date           



                                                  Limited #           



                                                  of times           

 

     Dilaudid            No   Ghassan K                0.5 mg =           [facility]

[facility]



               3-11           Chun                0.25 mL,           l



               00:02:                               Injection,           [place]



               00                                 IV Push,           



                                                  q10min PRN           



                                                  for pain           



                                                  severe           



                                                  (7-10),           



                                                  first dose           



                                                  03/10/16           



                                                  18:02:00           



                                                  CST            

 

     diphenhydrA            No   Ghassan K                25 mg =           M

[facility]



     MINE      3-11           Chun                0.5 mL,           l



               00:02:                               Injection,           [place]



               00                                 IV Push,           



                                                  Once PRN           



                                                  for            



                                                  itching,           



                                                  first dose           



                                                  03/10/16           



                                                  18:02:00           



                                                  CST            

 

     LR 1,000 mL            No   Ghassan K                1,000 mL,          

 [facility]



               3-11           Chun                IV, 75           l



               00:02:                               mL/hr,           [place]



               00                                 start date           



                                                  03/10/16           



                                                  18:02:00           



                                                  CST            

 

     Saline Lock            No   Ghassan K                10 mL,           Me

[facility]



     Flush      3-11           Chun                Soln, IV           l



               00:02:                               Push, As           [place]



               00                                 Indicated           



                                                  PRN for           



                                                  flush,           



                                                  first dose           



                                                  03/10/16           



                                                  18:02:00           



                                                  CST            

 

     Bupivacaine            No   Ghassan K                300 mL,           M

[facility]



     0.25% 300      3-11           Chun                Nerve           l



     mL pump 300      00:02:                               Block, 5           He

[facility]



     mL        00                                 mL/hr,           



                                                  start date           



                                                  03/10/16           



                                                  18:02:00           



                                                  CST            

 

     promethazin            No   Ghassan K                12.5 mg =          

 [facility]



     e         3-11           Chun                0.5 mL,           l



               00:02:                               Injection,           [place]



               00                                 IM, Once           



                                                  PRN for           



                                                  severe           



                                                  nausea,           



                                                  first dose           



                                                  03/10/16           



                                                  18:02:00           



                                                  CST            

 

     albuterol            No   Ghassan K                2.5 mg = 3           

[facility]



     2.5 mg/3 mL      3-11           Chun                mL, Soln,           

l



     (0.083%)      00:02:                               NEB, Once           [facility]

[facility]



     inhalation      00                                 PRN for           



     solution                                         wheezing,           



                                                  first dose           



                                                  03/10/16           



                                                  18:02:00           



                                                  CST            

 

     Demerol HCl            No   Ghassan K                12.5 mg =          

 [facility]



               3-11           Chun                0.25 mL,           l



               00:02:                               Injection,           [place]



               00                                 IV Push,           



                                                  Once PRN           



                                                  for            



                                                  shivers,           



                                                  first dose           



                                                  03/10/16           



                                                  18:02:00           



                                                  CST            

 

     ondansetron            No   Ghassan K                4 mg = 2           

[facility]



               3-11           Chun                mL,            l



               00:02:                               Injection,           [place]



               00                                 IV Push,           



                                                  q15min PRN           



                                                  for            



                                                  nausea/vom           



                                                  iting,           



                                                  order           



                                                  duration:           



                                                  2 doses,           



                                                  first dose           



                                                  03/10/16           



                                                  18:02:00           



                                                  CST, stop           



                                                  date           



                                                  Limited #           



                                                  of times           

 

     Dilaudid            No   Ghassan K                0.5 mg =           [facility]

[facility]



               3-11           Chun                0.25 mL,           l



               00:02:                               Injection,           [facility]



               00                                 IV Push,           



                                                  q10min PRN           



                                                  for pain           



                                                  severe           



                                                  (7-10),           



                                                  first dose           



                                                  03/10/16           



                                                  18:02:00           



                                                  CST            

 

     diphenhydrA            No   Ghassan K                25 mg =           M

[facility]



     MINE      3-11           Chun                0.5 mL,           l



               00:02:                               Injection,           [facility]



               00                                 IV Push,           



                                                  Once PRN           



                                                  for            



                                                  itching,           



                                                  first dose           



                                                  03/10/16           



                                                  18:02:00           



                                                  CST            

 

     LR 1,000 mL            No   Ghassan K                1,000 mL,          

 [facility]



               3-11           Chun                IV, 75           l



               00:02:                               mL/hr,           [place]



               00                                 start date           



                                                  03/10/16           



                                                  18:02:00           



                                                  CST            

 

     Saline Lock            No   Ghassan K                10 mL,           Me

moria



     Flush      3-11           Chun                Soln, IV           l



               00:02:                               Push, As           [facility]



               00                                 Indicated           



                                                  PRN for           



                                                  flush,           



                                                  first dose           



                                                  03/10/16           



                                                  18:02:00           



                                                  CST            

 

     Bupivacaine            No   Ghassan K                300 mL,           M

[facility]



     0.25% 300      3-11           Chun                Nerve           l



     mL pump 300      00:02:                               Block, 5           He

[facility]ann



     mL        00                                 mL/hr,           



                                                  start date           



                                                  03/10/16           



                                                  18:02:00           



                                                  CST            

 

     promethazin            No   Ghassan K                12.5 mg =          

 [facility]



     e         3-11           Chun                0.5 mL,           l



               00:02:                               Injection,           [place]



               00                                 IM, Once           



                                                  PRN for           



                                                  severe           



                                                  nausea,           



                                                  first dose           



                                                  03/10/16           



                                                  18:02:00           



                                                  CST            

 

     albuterol            No   Ghassan K                2.5 mg = 3           

[facility]



     2.5 mg/3 mL      3-11           Chun                mL, Soln,           

l



     (0.083%)      00:02:                               NEB, Once           [facility]

daryl



     inhalation      00                                 PRN for           



     solution                                         wheezing,           



                                                  first dose           



                                                  03/10/16           



                                                  18:02:00           



                                                  CST            

 

     Demerol HCl            No   Ghassan K                12.5 mg =          

 [facility]



               3-11           Chun                0.25 mL,           l



               00:02:                               Injection,           [place]



               00                                 IV Push,           



                                                  Once PRN           



                                                  for            



                                                  shivers,           



                                                  first dose           



                                                  03/10/16           



                                                  18:02:00           



                                                  CST            

 

     ondansetron            No   Ghassan K                4 mg = 2           

[facility]



               3-11           Chun                mL,            l



               00:02:                               Injection,           [place]



               00                                 IV Push,           



                                                  q15min PRN           



                                                  for            



                                                  nausea/vom           



                                                  iting,           



                                                  order           



                                                  duration:           



                                                  2 doses,           



                                                  first dose           



                                                  03/10/16           



                                                  18:02:00           



                                                  CST, stop           



                                                  date           



                                                  Limited #           



                                                  of times           

 

     Dilaudid            No   Ghassan K                0.5 mg =           [facility]

[facility]



               3-11           Chun                0.25 mL,           l



               00:02:                               Injection,           [place]



               00                                 IV Push,           



                                                  q10min PRN           



                                                  for pain           



                                                  severe           



                                                  (7-10),           



                                                  first dose           



                                                  03/10/16           



                                                  18:02:00           



                                                  CST            

 

     diphenhydrA            No   Ghassan K                25 mg =           M

[facility]



     MINE      3-11           Chun                0.5 mL,           l



               00:02:                               Injection,           [place]



                                                IV Push,           



                                                  Once PRN           



                                                  for            



                                                  itching,           



                                                  first dose           



                                                  03/10/16           



                                                  18:02:00           



                                                  CST            

 

     LR 1,000 mL            No   Ghassan K                1,000 mL,          

 [facility]



               3-11           Chun                IV, 75           l



               00:02:                               mL/hr,           [facility]                                 start date           



                                                  03/10/16           



                                                  18:02:00           



                                                  CST            

 

     Saline Lock            No   Ghassan K                10 mL,           Me

moria



     Flush      3-11           Chun                Soln, IV           l



               00:02:                               Push, As                                            Indicated           



                                                  PRN for           



                                                  flush,           



                                                  first dose           



                                                  03/10/16           



                                                  18:02:00           



                                                  CST            

 

     Bupivacaine            No   Ghassan K                300 mL,           M

[facility]



     0.25% 300      3-11           Chun                Nerve           l



     mL pump 300      00:02:                               Block, 5           He

[facility]ann



     mL        00                                 mL/hr,           



                                                  start date           



                                                  03/10/16           



                                                  18:02:00           



                                                  CST            

 

     Misc            No   Deuce                1,000 mL,           [facility]

a



     Medication      3-10           [facility]                Soln-IV,           l



               23:42:                               IV, Once,                                            first dose           



                                                  03/10/16           



                                                  17:42:00           



                                                  CST, stop           



                                                  date           



                                                  03/10/16           



                                                  17:42:00           



                                                  CST            

 

     Misc            No   Deuce                1,000 mL,           [facility]

a



     Medication      3-10           [place]                Soln-IV,           l



               23:42:                               IV, Once,                                            first dose           



                                                  03/10/16           



                                                  17:42:00           



                                                  CST, stop           



                                                  date           



                                                  03/10/16           



                                                  17:42:00           



                                                  CST            

 

     Misc            No   Deuce                1,000 mL,           [facility]

a



     Medication      3-10           [place]                Soln-IV,           l



               23:42:                               IV, Once,                                            first dose           



                                                  03/10/16           



                                                  17:42:00           



                                                  CST, stop           



                                                  date           



                                                  03/10/16           



                                                  17:42:00           



                                                  CST            

 

     Misc            No   Deuce                1,000 mL,           [facility]

a



     Medication      3-10           [place]                Soln-IV,           l



               23:42:                               IV, Once,                                            first dose           



                                                  03/10/16           



                                                  17:42:00           



                                                  CST, stop           



                                                  date           



                                                  03/10/16           



                                                  17:42:00           



                                                  CST            

 

     Misc            No   Deuce                1,000 mL,           [facility]

a



     Medication      3-10           [place]                Soln-IV,           l



               23:42:                               IV, Once,           [facility]



               00                                 first dose           



                                                  03/10/16           



                                                  17:42:00           



                                                  CST, stop           



                                                  date           



                                                  03/10/16           



                                                  17:42:00           



                                                  CST            

 

     Misc            No   Deuce                1,000 mL,           [facility]

a



     Medication      3-10           [place]                Soln-IV,           l



               23:42:                               IV, Once,           [place]



               00                                 first dose           



                                                  03/10/16           



                                                  17:42:00           



                                                  CST, stop           



                                                  date           



                                                  03/10/16           



                                                  17:42:00           



                                                  CST            

 

     Misc            No   Deuce                1,000 mL,           [facility]

a



     Medication      3-10           [place]                Soln-IV,           l



               23:42:                               IV, Once,           [place]



               00                                 first dose           



                                                  03/10/16           



                                                  17:42:00           



                                                  CST, stop           



                                                  date           



                                                  03/10/16           



                                                  17:42:00           



                                                  CST            

 

     fentaNYL            No   Deuce                25 mcg =           [facility]

[facility]



               3-10           [place]                0.5 mL,           l



               23:20:                               Injection,           [place]



               00                                 IV, Once,           



                                                  first dose           



                                                  03/10/16           



                                                  17:20:00           



                                                  CST, stop           



                                                  date           



                                                  03/10/16           



                                                  17:20:00           



                                                  CST            

 

     fentaNYL            No   Deuce                25 mcg =           Mem

[facility]



               3-10           [place]                0.5 mL,           l



               23:20:                               Injection,           [facility]



               00                                 IV, Once,           



                                                  first dose           



                                                  03/10/16           



                                                  17:20:00           



                                                  CST, stop           



                                                  date           



                                                  03/10/16           



                                                  17:20:00           



                                                  CST            

 

     fentaNYL            No   Deuce                25 mcg =           Mem

[facility]



               3-10           [place]                0.5 mL,           l



               23:20:                               Injection,           [place]



               00                                 IV, Once,           



                                                  first dose           



                                                  03/10/16           



                                                  17:20:00           



                                                  CST, stop           



                                                  date           



                                                  03/10/16           



                                                  17:20:00           



                                                  CST            

 

     fentaNYL            No   Deuce                25 mcg =           Mem

[facility]



               3-10           [place]                0.5 mL,           l



               23:20:                               Injection,           [facility]



               00                                 IV, Once,           



                                                  first dose           



                                                  03/10/16           



                                                  17:20:00           



                                                  CST, stop           



                                                  date           



                                                  03/10/16           



                                                  17:20:00           



                                                  CST            

 

     fentaNYL            No   [place]                25 mcg =           [facility]

[facility]



               3-10           [facility]                0.5 mL,           l



               23:20:                               Injection,           [place]



               00                                 IV, Once,           



                                                  first dose           



                                                  03/10/16           



                                                  17:20:00           



                                                  CST, stop           



                                                  date           



                                                  03/10/16           



                                                  17:20:00           



                                                  CST            

 

     fentaNYL      2016-0      No   Deuce                25 mcg =           Mem

[facility]



               3-10           [place]                0.5 mL,           l



               23:20:                               Injection,           [facility]



               00                                 IV, Once,           



                                                  first dose           



                                                  03/10/16           



                                                  17:20:00           



                                                  CST, stop           



                                                  date           



                                                  03/10/16           



                                                  17:20:00           



                                                  CST            

 

     fentaNYL      2016-0      No   Deuce                25 mcg =           Mem

[facility]



               3-10           [place]                0.5 mL,           l



               23:20:                               Injection,           [place]



               00                                 IV, Once,           



                                                  first dose           



                                                  03/10/16           



                                                  17:20:00           



                                                  CST, stop           



                                                  date           



                                                  03/10/16           



                                                  17:20:00           



                                                  CST            

 

     ondansetron      -0      No   Deuce                4 mg = 2           

[facility]



               3-10           [place]                mL,            l



               23:03:                               Injection,           [place]



               00                                 IV, Once,           



                                                  first dose           



                                                  03/10/16           



                                                  17:03:00           



                                                  CST, stop           



                                                  date           



                                                  03/10/16           



                                                  17:03:00           



                                                  CST            

 

     ondansetron      -0      No   Deuce                4 mg = 2           

[facility]



               3-10           [place]                mL,            l



               23:03:                               Injection,           [place]



               00                                 IV, Once,           



                                                  first dose           



                                                  03/10/16           



                                                  17:03:00           



                                                  CST, stop           



                                                  date           



                                                  03/10/16           



                                                  17:03:00           



                                                  CST            

 

     ondansetron      -0      No   Deuce                4 mg = 2           

[facility]



               3-10           Wheat                mL,            l



               23:03:                               Injection,           [place]



               00                                 IV, Once,           



                                                  first dose           



                                                  03/10/16           



                                                  17:03:00           



                                                  CST, stop           



                                                  date           



                                                  03/10/16           



                                                  17:03:00           



                                                  CST            

 

     ondansetron      -0      No   Deuce                4 mg = 2           

[facility]



               3-10           Wheat                mL,            l



               23:03:                               Injection,           [place]



               00                                 IV, Once,           



                                                  first dose           



                                                  03/10/16           



                                                  17:03:00           



                                                  CST, stop           



                                                  date           



                                                  03/10/16           



                                                  17:03:00           



                                                  CST            

 

     ondansetron      -0      No   Deuce                4 mg = 2           

[facility]



               3-10           Wheat                mL,            l



               23:03:                               Injection,           [place]



               00                                 IV, Once,           



                                                  first dose           



                                                  03/10/16           



                                                  17:03:00           



                                                  CST, stop           



                                                  date           



                                                  03/10/16           



                                                  17:03:00           



                                                  CST            

 

     ondansetron      -0      No   Deuce                4 mg = 2           

[facility]



               3-10           Wheat                mL,            l



               23:03:                               Injection,           [place]



               00                                 IV, Once,           



                                                  first dose           



                                                  03/10/16           



                                                  17:03:00           



                                                  CST, stop           



                                                  date           



                                                  03/10/16           



                                                  17:03:00           



                                                  CST            

 

     ondansetron      -0      No   Deuce                4 mg = 2           

[facility]



               3-10           Wheat                mL,            l



               23:03:                               Injection,           [place]



               00                                 IV, Once,           



                                                  first dose           



                                                  03/10/16           



                                                  17:03:00           



                                                  CST, stop           



                                                  date           



                                                  03/10/16           



                                                  17:03:00           



                                                  CST            

 

     fentaNYL      -0      No   Deuce                25 mcg =           [facility]

[facility]



               3-10           Wheat                0.5 mL,           l



               23:00:                               Injection,           [place]



               00                                 IV, Once,           



                                                  first dose           



                                                  03/10/16           



                                                  17:00:00           



                                                  CST, stop           



                                                  date           



                                                  03/10/16           



                                                  17:00:00           



                                                  CST            

 

     fentaNYL      -0      No   Deuce                25 mcg =           [facility]

[facility]



               3-10           Wheat                0.5 mL,           l



               23:00:                               Injection,           [place]



               00                                 IV, Once,           



                                                  first dose           



                                                  03/10/16           



                                                  17:00:00           



                                                  CST, stop           



                                                  date           



                                                  03/10/16           



                                                  17:00:00           



                                                  CST            

 

     fentaNYL      -0      No   Deuce                25 mcg =           [facility]

[facility]



               3-10           Wheat                0.5 mL,           l



               23:00:                               Injection,           [place]



               00                                 IV, Once,           



                                                  first dose           



                                                  03/10/16           



                                                  17:00:00           



                                                  CST, stop           



                                                  date           



                                                  03/10/16           



                                                  17:00:00           



                                                  CST            

 

     fentaNYL      -0      No   Deuce                25 mcg =           [facility]

[facility]



               3-10           Wheat                0.5 mL,           l



               23:00:                               Injection,           [place]



               00                                 IV, Once,           



                                                  first dose           



                                                  03/10/16           



                                                  17:00:00           



                                                  CST, stop           



                                                  date           



                                                  03/10/16           



                                                  17:00:00           



                                                  CST            

 

     fentaNYL      -0      No   Deuce                25 mcg =           [facility]

[facility]



               3-10           Wheat                0.5 mL,           l



               23:00:                               Injection,           [place]



               00                                 IV, Once,           



                                                  first dose           



                                                  03/10/16           



                                                  17:00:00           



                                                  CST, stop           



                                                  date           



                                                  03/10/16           



                                                  17:00:00           



                                                  CST            

 

     fentaNYL      -0      No   Deuce                25 mcg =           [facility]

[facility]



               3-10           Wheat                0.5 mL,           l



               23:00:                               Injection,           [place]



               00                                 IV, Once,           



                                                  first dose           



                                                  03/10/16           



                                                  17:00:00           



                                                  CST, stop           



                                                  date           



                                                  03/10/16           



                                                  17:00:00           



                                                  CST            

 

     fentaNYL      -0      No   Deuce                25 mcg =           [facility]

[facility]



               3-10           Wheat                0.5 mL,           l



               23:00:                               Injection,           [place]



               00                                 IV, Once,           



                                                  first dose           



                                                  03/10/16           



                                                  17:00:00           



                                                  CST, stop           



                                                  date           



                                                  03/10/16           



                                                  17:00:00           



                                                  CST            

 

     fentaNYL      -      No   Deuce                25 mcg =           [facility]

[facility]



               3-10           [place]                0.5 mL,           l



               22:48:                               Injection,           [place]



               00                                 IV, Once,           



                                                  first dose           



                                                  03/10/16           



                                                  16:48:00           



                                                  CST, stop           



                                                  date           



                                                  03/10/16           



                                                  16:48:00           



                                                  CST            

 

     fentaNYL      -      No   Deuce                25 mcg =           [facility]

[facility]



               3-10           [place]heat                0.5 mL,           l



               22:48:                               Injection,           [place]



               00                                 IV, Once,           



                                                  first dose           



                                                  03/10/16           



                                                  16:48:00           



                                                  CST, stop           



                                                  date           



                                                  03/10/16           



                                                  16:48:00           



                                                  CST            

 

     fentaNYL      -      No   Deuce                25 mcg =           [facility]

[facility]



               3-10           [place]heat                0.5 mL,           l



               22:48:                               Injection,           [place]



               00                                 IV, Once,           



                                                  first dose           



                                                  03/10/16           



                                                  16:48:00           



                                                  CST, stop           



                                                  date           



                                                  03/10/16           



                                                  16:48:00           



                                                  CST            

 

     fentaNYL      -      No   Deuce                25 mcg =           [facility]

[facility]



               3-10           [place]                0.5 mL,           l



               22:48:                               Injection,           [place]



               00                                 IV, Once,           



                                                  first dose           



                                                  03/10/16           



                                                  16:48:00           



                                                  CST, stop           



                                                  date           



                                                  03/10/16           



                                                  16:48:00           



                                                  CST            

 

     fentaNYL      -      No   Deuce                25 mcg =           [facility]

[facility]



               3-10           [place]                0.5 mL,           l



               22:48:                               Injection,           [place]



               00                                 IV, Once,           



                                                  first dose           



                                                  03/10/16           



                                                  16:48:00           



                                                  CST, stop           



                                                  date           



                                                  03/10/16           



                                                  16:48:00           



                                                  CST            

 

     fentaNYL      -      No   Deuce                25 mcg =           [facility]

[facility]



               3-10           [place]                0.5 mL,           l



               22:48:                               Injection,           [place]



               00                                 IV, Once,           



                                                  first dose           



                                                  03/10/16           



                                                  16:48:00           



                                                  CST, stop           



                                                  date           



                                                  03/10/16           



                                                  16:48:00           



                                                  CST            

 

     fentaNYL      -      No   Deuce                25 mcg =           [facility]

[facility]



               3-10           Wheat                0.5 mL,           l



               22:48:                               Injection,           [place]



               00                                 IV, Once,           



                                                  first dose           



                                                  03/10/16           



                                                  16:48:00           



                                                  CST, stop           



                                                  date           



                                                  03/10/16           



                                                  16:48:00           



                                                  CST            

 

     fentaNYL      -0      No   Deuce                25 mcg =           [facility]

[facility]



               3-10           [place]                0.5 mL,           l



               22:37:                               Injection,           [place]



               00                                 IV, Once,           



                                                  first dose           



                                                  03/10/16           



                                                  16:37:00           



                                                  CST, stop           



                                                  date           



                                                  03/10/16           



                                                  16:37:00           



                                                  CST            

 

     fentaNYL            No   Deuce                25 mcg =           [facility]

[facility]



               3-10           [place]                0.5 mL,           l



               22:37:                               Injection,           [place]



               00                                 IV, Once,           



                                                  first dose           



                                                  03/10/16           



                                                  16:37:00           



                                                  CST, stop           



                                                  date           



                                                  03/10/16           



                                                  16:37:00           



                                                  CST            

 

     fentaNYL            No   Deuce                25 mcg =           [facility]

[facility]



               3-10           [place]                0.5 mL,           l



               22:37:                               Injection,           [place]



               00                                 IV, Once,           



                                                  first dose           



                                                  03/10/16           



                                                  16:37:00           



                                                  CST, stop           



                                                  date           



                                                  03/10/16           



                                                  16:37:00           



                                                  CST            

 

     fentaNYL            No   Deuce                25 mcg =           [facility]

[facility]



               3-10           [place]heat                0.5 mL,           l



               22:37:                               Injection,           [place]



               00                                 IV, Once,           



                                                  first dose           



                                                  03/10/16           



                                                  16:37:00           



                                                  CST, stop           



                                                  date           



                                                  03/10/16           



                                                  16:37:00           



                                                  CST            

 

     fentaNYL            No   Deuce                25 mcg =           [facility]

[facility]



               3-10           [place]                0.5 mL,           l



               22:37:                               Injection,           [place]



               00                                 IV, Once,           



                                                  first dose           



                                                  03/10/16           



                                                  16:37:00           



                                                  CST, stop           



                                                  date           



                                                  03/10/16           



                                                  16:37:00           



                                                  CST            

 

     fentaNYL            No   Deuce                25 mcg =           [facility]

[facility]



               3-10           [place]                0.5 mL,           l



               22:37:                               Injection,           [place]



               00                                 IV, Once,           



                                                  first dose           



                                                  03/10/16           



                                                  16:37:00           



                                                  CST, stop           



                                                  date           



                                                  03/10/16           



                                                  16:37:00           



                                                  CST            

 

     fentaNYL            No   Deuce                25 mcg =           [facility]

[facility]



               3-10           [place]                0.5 mL,           l



               22:37:                               Injection,           [place]



               00                                 IV, Once,           



                                                  first dose           



                                                  03/10/16           



                                                  16:37:00           



                                                  CST, stop           



                                                  date           



                                                  03/10/16           



                                                  16:37:00           



                                                  CST            

 

     dexamethaso            No   [place]uce                8 mg = 2           

[facility]



     ne        3-10           [place]                mL,            l



               22:23:                               Injection,           [place]



               00                                 IV, Once,           



                                                  first dose           



                                                  03/10/16           



                                                  16:23:00           



                                                  CST, stop           



                                                  date           



                                                  03/10/16           



                                                  16:23:00           



                                                  CST            

 

     dexamethaso      2016-0      No   [place]                8 mg = 2           

[facility]



     ne        3-10           W[facility]                mL,            l



               22:23:                               Injection,           [place]



               00                                 IV, Once,           



                                                  first dose           



                                                  03/10/16           



                                                  16:23:00           



                                                  CST, stop           



                                                  date           



                                                  03/10/16           



                                                  16:23:00           



                                                  CST            

 

     dexamethaso      2016-0      No   [facility]                8 mg = 2           

[facility]



     ne        3-10           [facility]                mL,            l



               22:23:                               Injection,           [place]



               00                                 IV, Once,           



                                                  first dose           



                                                  03/10/16           



                                                  16:23:00           



                                                  CST, stop           



                                                  date           



                                                  03/10/16           



                                                  16:23:00           



                                                  CST            

 

     dexamethaso      2016-0      No   [facility]                8 mg = 2           

[facility]



     ne        3-10           [facility]                mL,            l



               22:23:                               Injection,           [place]



               00                                 IV, Once,           



                                                  first dose           



                                                  03/10/16           



                                                  16:23:00           



                                                  CST, stop           



                                                  date           



                                                  03/10/16           



                                                  16:23:00           



                                                  CST            

 

     dexamethaso      2016-0      No   [facility]                8 mg = 2           

[facility]



     ne        3-10           [facility]                mL,            l



               22:23:                               Injection,           [place]



               00                                 IV, Once,           



                                                  first dose           



                                                  03/10/16           



                                                  16:23:00           



                                                  CST, stop           



                                                  date           



                                                  03/10/16           



                                                  16:23:00           



                                                  CST            

 

     dexamethaso      2016-0      No   [facility]                8 mg = 2           

[facility]



     ne        3-10           [place]                mL,            l



               22:23:                               Injection,           [place]



               00                                 IV, Once,           



                                                  first dose           



                                                  03/10/16           



                                                  16:23:00           



                                                  CST, stop           



                                                  date           



                                                  03/10/16           



                                                  16:23:00           



                                                  CST            

 

     dexamethaso      2016-0      No   [facility]                8 mg = 2           

[facility]



     ne        3-10           [place]                mL,            l



               22:23:                               Injection,           [place]



               00                                 IV, Once,           



                                                  first dose           



                                                  03/10/16           



                                                  16:23:00           



                                                  CST, stop           



                                                  date           



                                                  03/10/16           



                                                  16:23:00           



                                                  CST            

 

     Misc      2016-0      No   [facility]uce                1,000 mL,           [facility]

a



     Medication      3-10           [facility]                Soln-IV,           l



               22:21:                               IV, Once,           [place]



               00                                 first dose           



                                                  03/10/16           



                                                  16:21:00           



                                                  CST, stop           



                                                  date           



                                                  03/10/16           



                                                  16:21:00           



                                                  CST            

 

     Misc      2016-0      No   [facility]uce                1,000 mL,           [facility]

a



     Medication      3-10           [facility]                Soln-IV,           l



               22:21:                               IV, Once,           [place]



               00                                 first dose           



                                                  03/10/16           



                                                  16:21:00           



                                                  CST, stop           



                                                  date           



                                                  03/10/16           



                                                  16:21:00           



                                                  CST            

 

     Misc      -0      No   Deuce                1,000 mL,           [facility]

a



     Medication      3-10           [facility]                Soln-IV,           l



               22:21:                               IV, Once,           [place]



               00                                 first dose           



                                                  03/10/16           



                                                  16:21:00           



                                                  CST, stop           



                                                  date           



                                                  03/10/16           



                                                  16:21:00           



                                                  CST            

 

     Misc      -0      No   Deuce                1,000 mL,           [facility]

a



     Medication      3-10           [facility]                Soln-IV,           l



               22:21:                               IV, Once,           [place]



               00                                 first dose           



                                                  03/10/16           



                                                  16:21:00           



                                                  CST, stop           



                                                  date           



                                                  03/10/16           



                                                  16:21:00           



                                                  CST            

 

     Misc      -0      No   Deuce                1,000 mL,           [facility]

a



     Medication      3-10           [facility]                Soln-IV,           l



               22:21:                               IV, Once,           [place]



               00                                 first dose           



                                                  03/10/16           



                                                  16:21:00           



                                                  CST, stop           



                                                  date           



                                                  03/10/16           



                                                  16:21:00           



                                                  CST            

 

     Misc      -0      No   Deuce                1,000 mL,           [facility]

a



     Medication      3-10           [facility]                Soln-IV,           l



               22:21:                               IV, Once,           [place]



               00                                 first dose           



                                                  03/10/16           



                                                  16:21:00           



                                                  CST, stop           



                                                  date           



                                                  03/10/16           



                                                  16:21:00           



                                                  CST            

 

     Misc      -0      No   Deuce                1,000 mL,           [facility]

a



     Medication      3-10           [facility]                Soln-IV,           l



               22:21:                               IV, Once,           [place]



               00                                 first dose           



                                                  03/10/16           



                                                  16:21:00           



                                                  CST, stop           



                                                  date           



                                                  03/10/16           



                                                  16:21:00           



                                                  CST            

 

     clindamycin      -0      Yes  Deuce                928.125           [facility]

[facility]



               3-10           [facility]                mg,            l



               22:18:                               Soln-IV,           [place]



               00                                 IV, Once,           



                                                  first dose           



                                                  03/10/16           



                                                  16:18:00           



                                                  CST, stop           



                                                  date           



                                                  03/10/16           



                                                  16:18:00           



                                                  CST            

 

     clindamycin      -0      Yes  Deuce                928.125           [facility]

[facility]



               3-10           [place]                mg,            l



               22:18:                               Soln-IV,           [place]



               00                                 IV, Once,           



                                                  first dose           



                                                  03/10/16           



                                                  16:18:00           



                                                  CST, stop           



                                                  date           



                                                  03/10/16           



                                                  16:18:00           



                                                  CST            

 

     clindamycin      2016-0      Yes  Deuce                928.125           [facility]

[facility]



               3-10           [place]                mg,            l



               22:18:                               Soln-IV,           [place]



               00                                 IV, Once,           



                                                  first dose           



                                                  03/10/16           



                                                  16:18:00           



                                                  CST, stop           



                                                  date           



                                                  03/10/16           



                                                  16:18:00           



                                                  CST            

 

     clindamycin            Yes  Deuce                928.125           [facility]

[facility]



               3-10           [facility]                mg,            l



               22:18:                               Soln-IV,           [place]



               00                                 IV, Once,           



                                                  first dose           



                                                  03/10/16           



                                                  16:18:00           



                                                  CST, stop           



                                                  date           



                                                  03/10/16           



                                                  16:18:00           



                                                  CST            

 

     clindamycin            Yes  Deuce                928.125           [facility]

[facility]



               3-10           [facility]                mg,            l



               22:18:                               Soln-IV,           [place]



               00                                 IV, Once,           



                                                  first dose           



                                                  03/10/16           



                                                  16:18:00           



                                                  CST, stop           



                                                  date           



                                                  03/10/16           



                                                  16:18:00           



                                                  CST            

 

     clindamycin            Yes  Deuce                928.125           [facility]

[facility]



               3-10           [facility]                mg,            l



               22:18:                               Soln-IV,           [place]



               00                                 IV, Once,           



                                                  first dose           



                                                  03/10/16           



                                                  16:18:00           



                                                  CST, stop           



                                                  date           



                                                  03/10/16           



                                                  16:18:00           



                                                  CST            

 

     clindamycin            Yes  Deuce                928.125           [facility]

[facility]



               3-10           [facility]                mg,            l



               22:18:                               Soln-IV,           [place]



               00                                 IV, Once,           



                                                  first dose           



                                                  03/10/16           



                                                  16:18:00           



                                                  CST, stop           



                                                  date           



                                                  03/10/16           



                                                  16:18:00           



                                                  CST            

 

     fentaNYL            No   Deuce                25 mcg =           [facility]

[facility]



               3-10           [facility]                0.5 mL,           l



               22:05:                               Injection,           [place]



               00                                 IV, Once,           



                                                  first dose           



                                                  03/10/16           



                                                  16:05:00           



                                                  CST, stop           



                                                  date           



                                                  03/10/16           



                                                  16:05:00           



                                                  CST            

 

     midazolam            No   [facility]uce                0.5 mg =           [facility]

[facility]



               3-10           [facility]                0.5 mL,           l



               22:05:                               Injection,           [place]



               00                                 IV, Once,           



                                                  first dose           



                                                  03/10/16           



                                                  16:05:00           



                                                  CST, stop           



                                                  date           



                                                  03/10/16           



                                                  16:05:00           



                                                  CST            

 

     fentaNYL            No   Deuce                25 mcg =           [facility]

[facility]



               3-10           [facility]                0.5 mL,           l



               22:05:                               Injection,           [place]



               00                                 IV, Once,           



                                                  first dose           



                                                  03/10/16           



                                                  16:05:00           



                                                  CST, stop           



                                                  date           



                                                  03/10/16           



                                                  16:05:00           



                                                  CST            

 

     midazolam      -0      No   [facility]uce                0.5 mg =           [facility]

[facility]



               3-10           [place]                0.5 mL,           l



               22:05:                               Injection,           [place]



               00                                 IV, Once,           



                                                  first dose           



                                                  03/10/16           



                                                  16:05:00           



                                                  CST, stop           



                                                  date           



                                                  03/10/16           



                                                  16:05:00           



                                                  CST            

 

     fentaNYL      -0      No   Deuce                25 mcg =           [facility]

[facility]



               3-10           [facility]                0.5 mL,           l



               22:05:                               Injection,           [place]



               00                                 IV, Once,           



                                                  first dose           



                                                  03/10/16           



                                                  16:05:00           



                                                  CST, stop           



                                                  date           



                                                  03/10/16           



                                                  16:05:00           



                                                  CST            

 

     midazolam      -0      No   [facility]uce                0.5 mg =           [facility]

[facility]



               3-10           [facility]                0.5 mL,           l



               22:05:                               Injection,           [place]



               00                                 IV, Once,           



                                                  first dose           



                                                  03/10/16           



                                                  16:05:00           



                                                  CST, stop           



                                                  date           



                                                  03/10/16           



                                                  16:05:00           



                                                  CST            

 

     fentaNYL      2016-0      No   Deuce                25 mcg =           [facility]

[facility]



               3-10           [facility]                0.5 mL,           l



               22:05:                               Injection,           [place]



               00                                 IV, Once,           



                                                  first dose           



                                                  03/10/16           



                                                  16:05:00           



                                                  CST, stop           



                                                  date           



                                                  03/10/16           



                                                  16:05:00           



                                                  CST            

 

     midazolam      2016-0      No   [facility]uce                0.5 mg =           [facility]

[facility]



               3-10           [facility]                0.5 mL,           l



               22:05:                               Injection,           [place]



               00                                 IV, Once,           



                                                  first dose           



                                                  03/10/16           



                                                  16:05:00           



                                                  CST, stop           



                                                  date           



                                                  03/10/16           



                                                  16:05:00           



                                                  CST            

 

     fentaNYL      2016-0      No   [facility]                25 mcg =           [facility]

[facility]



               3-10           [facility]                0.5 mL,           l



               22:05:                               Injection,           [place]



               00                                 IV, Once,           



                                                  first dose           



                                                  03/10/16           



                                                  16:05:00           



                                                  CST, stop           



                                                  date           



                                                  03/10/16           



                                                  16:05:00           



                                                  CST            

 

     midazolam      -0      No   Deuce                0.5 mg =           Me

[facility]



               3-10           Wheat                0.5 mL,           l



               22:05:                               Injection,           [place]



               00                                 IV, Once,           



                                                  first dose           



                                                  03/10/16           



                                                  16:05:00           



                                                  CST, stop           



                                                  date           



                                                  03/10/16           



                                                  16:05:00           



                                                  CST            

 

     fentaNYL      -0      No   Deuce                25 mcg =           [facility]

[facility]



               3-10           Wheat                0.5 mL,           l



               22:05:                               Injection,           [place]



               00                                 IV, Once,           



                                                  first dose           



                                                  03/10/16           



                                                  16:05:00           



                                                  CST, stop           



                                                  date           



                                                  03/10/16           



                                                  16:05:00           



                                                  CST            

 

     midazolam      0      No   Deuce                0.5 mg =           Me

[facility]



               3-10           Wheat                0.5 mL,           l



               22:05:                               Injection,           [place]



               00                                 IV, Once,           



                                                  first dose           



                                                  03/10/16           



                                                  16:05:00           



                                                  CST, stop           



                                                  date           



                                                  03/10/16           



                                                  16:05:00           



                                                  CST            

 

     fentaNYL            No   Deuce                25 mcg =           [facility]

[facility]



               3-10           Wheat                0.5 mL,           l



               22:05:                               Injection,           [place]



               00                                 IV, Once,           



                                                  first dose           



                                                  03/10/16           



                                                  16:05:00           



                                                  CST, stop           



                                                  date           



                                                  03/10/16           



                                                  16:05:00           



                                                  CST            

 

     midazolam            No   Deuce                0.5 mg =           Me

[facility]



               3-10           Wheat                0.5 mL,           l



               22:05:                               Injection,           [place]



               00                                 IV, Once,           



                                                  first dose           



                                                  03/10/16           



                                                  16:05:00           



                                                  CST, stop           



                                                  date           



                                                  03/10/16           



                                                  16:05:00           



                                                  CST            

 

     lidocaine      0      No   Deuce                3 mL,           [facility]

[facility]



               3-10           Wheat                Injection,           l



               21:58:                               IV, Once,           [place]



               00                                 first dose           



                                                  03/10/16           



                                                  15:58:00           



                                                  CST, stop           



                                                  date           



                                                  03/10/16           



                                                  15:58:00           



                                                  CST            

 

     propofol      -      No   Deuce                120 mg =           [facility]

[facility]



               3-10           [place]                12 mL,           l



               21:58:                               Emulsion,           [place]



               00                                 IV, Once,           



                                                  first dose           



                                                  03/10/16           



                                                  15:58:00           



                                                  CST, stop           



                                                  date           



                                                  03/10/16           



                                                  15:58:00           



                                                  CST            

 

     lidocaine      -0      No   Deuce                3 mL,           [facility]

[facility]



               3-10           Wheat                Injection,           l



               21:58:                               IV, Once,           [place]



               00                                 first dose           



                                                  03/10/16           



                                                  15:58:00           



                                                  CST, stop           



                                                  date           



                                                  03/10/16           



                                                  15:58:00           



                                                  CST            

 

     propofol            No   Deuce                120 mg =           [facility]

[facility]



               3-10           [place]                12 mL,           l



               21:58:                               Emulsion,           [place]



               00                                 IV, Once,           



                                                  first dose           



                                                  03/10/16           



                                                  15:58:00           



                                                  CST, stop           



                                                  date           



                                                  03/10/16           



                                                  15:58:00           



                                                  CST            

 

     lidocaine      -0      No   Deuce                3 mL,           [facility]

[facility]



               3-10           Wheat                Injection,           l



               21:58:                               IV, Once,           [place]



               00                                 first dose           



                                                  03/10/16           



                                                  15:58:00           



                                                  CST, stop           



                                                  date           



                                                  03/10/16           



                                                  15:58:00           



                                                  CST            

 

     propofol      0      No   Deuce                120 mg =           [facility]

[facility]



               3-10           [place]                12 mL,           l



               21:58:                               Emulsion,           [place]



               00                                 IV, Once,           



                                                  first dose           



                                                  03/10/16           



                                                  15:58:00           



                                                  CST, stop           



                                                  date           



                                                  03/10/16           



                                                  15:58:00           



                                                  CST            

 

     lidocaine      0      No   Deuce                3 mL,           [facility]

[facility]



               3-10           Wheat                Injection,           l



               21:58:                               IV, Once,           [place]



               00                                 first dose           



                                                  03/10/16           



                                                  15:58:00           



                                                  CST, stop           



                                                  date           



                                                  03/10/16           



                                                  15:58:00           



                                                  CST            

 

     propofol            No   Deuce                120 mg =           [facility]

[facility]



               3-10           [place]                12 mL,           l



               21:58:                               Emulsion,           [place]



               00                                 IV, Once,           



                                                  first dose           



                                                  03/10/16           



                                                  15:58:00           



                                                  CST, stop           



                                                  date           



                                                  03/10/16           



                                                  15:58:00           



                                                  CST            

 

     lidocaine      0      No   Deuce                3 mL,           [facility]

[facility]



               3-10           [place]                Injection,           l



               21:58:                               IV, Once,           [place]



               00                                 first dose           



                                                  03/10/16           



                                                  15:58:00           



                                                  CST, stop           



                                                  date           



                                                  03/10/16           



                                                  15:58:00           



                                                  CST            

 

     propofol            No   Deuce                120 mg =           [facility]

[facility]



               3-10           [place]                12 mL,           l



               21:58:                               Emulsion,           [place]



               00                                 IV, Once,           



                                                  first dose           



                                                  03/10/16           



                                                  15:58:00           



                                                  CST, stop           



                                                  date           



                                                  03/10/16           



                                                  15:58:00           



                                                  CST            

 

     lidocaine      0      No   Deuce                3 mL,           [facility]

[facility]



               3-10           [place]                Injection,           l



               21:58:                               IV, Once,           [place]



               00                                 first dose           



                                                  03/10/16           



                                                  15:58:00           



                                                  CST, stop           



                                                  date           



                                                  03/10/16           



                                                  15:58:00           



                                                  CST            

 

     propofol      0      No   Deuce                120 mg =           [facility]

[facility]



               3-10           [place]                12 mL,           l



               21:58:                               Emulsion,           [place]



               00                                 IV, Once,           



                                                  first dose           



                                                  03/10/16           



                                                  15:58:00           



                                                  CST, stop           



                                                  date           



                                                  03/10/16           



                                                  15:58:00           



                                                  CST            

 

     lidocaine      0      No   [facility]uce                3 mL,           [facility]

[facility]



               3-10           [place]                Injection,           l



               21:58:                               IV, Once,           [place]



               00                                 first dose           



                                                  03/10/16           



                                                  15:58:00           



                                                  CST, stop           



                                                  date           



                                                  03/10/16           



                                                  15:58:00           



                                                  CST            

 

     propofol      -0      No   Deuce                120 mg =           [facility]

[facility]



               3-10           [place]                12 mL,           l



               21:58:                               Emulsion,           [place]



               00                                 IV, Once,           



                                                  first dose           



                                                  03/10/16           



                                                  15:58:00           



                                                  CST, stop           



                                                  date           



                                                  03/10/16           



                                                  15:58:00           



                                                  CST            

 

     midazolam      -0      No   Deuce                0.5 mg =           [facility]

[facility]



               3-10           [place]                0.5 mL,           l



               21:50:                               Injection,           [place]



               00                                 IV, Once,           



                                                  first dose           



                                                  03/10/16           



                                                  15:50:00           



                                                  CST, stop           



                                                  date           



                                                  03/10/16           



                                                  15:50:00           



                                                  CST            

 

     fentaNYL      -      No   Deuce                25 mcg =           [facility]

[facility]



               3-10           [place]                0.5 mL,           l



               21:50:                               Injection,           [place]



               00                                 IV, Once,           



                                                  first dose           



                                                  03/10/16           



                                                  15:50:00           



                                                  CST, stop           



                                                  date           



                                                  03/10/16           



                                                  15:50:00           



                                                  CST            

 

     midazolam      -      No   Deuce                0.5 mg =           [facility]

[facility]



               3-10           [place]                0.5 mL,           l



               21:50:                               Injection,           [place]



               00                                 IV, Once,           



                                                  first dose           



                                                  03/10/16           



                                                  15:50:00           



                                                  CST, stop           



                                                  date           



                                                  03/10/16           



                                                  15:50:00           



                                                  CST            

 

     fentaNYL      -0      No   Deuce                25 mcg =           [facility]

[facility]



               3-10           [place]                0.5 mL,           l



               21:50:                               Injection,           [place]



               00                                 IV, Once,           



                                                  first dose           



                                                  03/10/16           



                                                  15:50:00           



                                                  CST, stop           



                                                  date           



                                                  03/10/16           



                                                  15:50:00           



                                                  CST            

 

     midazolam      -0      No   [facility]uce                0.5 mg =           [facility]

[facility]



               3-10           [place]                0.5 mL,           l



               21:50:                               Injection,           [place]



               00                                 IV, Once,           



                                                  first dose           



                                                  03/10/16           



                                                  15:50:00           



                                                  CST, stop           



                                                  date           



                                                  03/10/16           



                                                  15:50:00           



                                                  CST            

 

     fentaNYL      -0      No   Deuce                25 mcg =           [facility]

[facility]



               3-10           [place]                0.5 mL,           l



               21:50:                               Injection,           [place]



               00                                 IV, Once,           



                                                  first dose           



                                                  03/10/16           



                                                  15:50:00           



                                                  CST, stop           



                                                  date           



                                                  03/10/16           



                                                  15:50:00           



                                                  CST            

 

     midazolam      -0      No   [facility]uce                0.5 mg =           [facility]

[facility]



               3-10           [place]                0.5 mL,           l



               21:50:                               Injection,           [place]



               00                                 IV, Once,           



                                                  first dose           



                                                  03/10/16           



                                                  15:50:00           



                                                  CST, stop           



                                                  date           



                                                  03/10/16           



                                                  15:50:00           



                                                  CST            

 

     fentaNYL      -      No   Deuce                25 mcg =           [facility]

[facility]



               3-10           [place]                0.5 mL,           l



               21:50:                               Injection,           [place]



               00                                 IV, Once,           



                                                  first dose           



                                                  03/10/16           



                                                  15:50:00           



                                                  CST, stop           



                                                  date           



                                                  03/10/16           



                                                  15:50:00           



                                                  CST            

 

     midazolam            No   Deuce                0.5 mg =           Me

[facility]



               3-10           [place]                0.5 mL,           l



               21:50:                               Injection,           [place]



               00                                 IV, Once,           



                                                  first dose           



                                                  03/10/16           



                                                  15:50:00           



                                                  CST, stop           



                                                  date           



                                                  03/10/16           



                                                  15:50:00           



                                                  CST            

 

     fentaNYL            No   Deuce                25 mcg =           [facility]

[facility]



               3-10           Wheat                0.5 mL,           l



               21:50:                               Injection,           [place]



               00                                 IV, Once,           



                                                  first dose           



                                                  03/10/16           



                                                  15:50:00           



                                                  CST, stop           



                                                  date           



                                                  03/10/16           



                                                  15:50:00           



                                                  CST            

 

     midazolam            No   Deuce                0.5 mg =           Me

[facility]



               3-10           Wheat                0.5 mL,           l



               21:50:                               Injection,           [place]



               00                                 IV, Once,           



                                                  first dose           



                                                  03/10/16           



                                                  15:50:00           



                                                  CST, stop           



                                                  date           



                                                  03/10/16           



                                                  15:50:00           



                                                  CST            

 

     fentaNYL            No   Deuce                25 mcg =           [facility]

[facility]



               3-10           Wheat                0.5 mL,           l



               21:50:                               Injection,           [place]



               00                                 IV, Once,           



                                                  first dose           



                                                  03/10/16           



                                                  15:50:00           



                                                  CST, stop           



                                                  date           



                                                  03/10/16           



                                                  15:50:00           



                                                  CST            

 

     midazolam            No   Deuce                0.5 mg =           Me

[facility]



               3-10           Wheat                0.5 mL,           l



               21:50:                               Injection,           [place]



               00                                 IV, Once,           



                                                  first dose           



                                                  03/10/16           



                                                  15:50:00           



                                                  CST, stop           



                                                  date           



                                                  03/10/16           



                                                  15:50:00           



                                                  CST            

 

     fentaNYL      -      No   Deuce                25 mcg =           [facility]

[facility]



               3-10           Wheat                0.5 mL,           l



               21:50:                               Injection,           [place]



               00                                 IV, Once,           



                                                  first dose           



                                                  03/10/16           



                                                  15:50:00           



                                                  CST, stop           



                                                  date           



                                                  03/10/16           



                                                  15:50:00           



                                                  CST            

 

     midazolam      2016-0      No   Deuce                0.5 mg =           Me

[facility]



               3-10           Wheat                0.5 mL,           l



               21:10:                               Injection,           [place]



               00                                 IV, Once,           



                                                  first dose           



                                                  03/10/16           



                                                  15:10:00           



                                                  CST, stop           



                                                  date           



                                                  03/10/16           



                                                  15:10:00           



                                                  CST            

 

     fentaNYL      -0      No   Deuce                25 mcg =           [facility]

[facility]



               3-10           Wheat                0.5 mL,           l



               21:10:                               Injection,           [place]



               00                                 IV, Once,           



                                                  first dose           



                                                  03/10/16           



                                                  15:10:00           



                                                  CST, stop           



                                                  date           



                                                  03/10/16           



                                                  15:10:00           



                                                  CST            

 

     midazolam      -0      No   Deuce                0.5 mg =           Me

[facility]



               3-10           Wheat                0.5 mL,           l



               21:10:                               Injection,           [place]



               00                                 IV, Once,           



                                                  first dose           



                                                  03/10/16           



                                                  15:10:00           



                                                  CST, stop           



                                                  date           



                                                  03/10/16           



                                                  15:10:00           



                                                  CST            

 

     fentaNYL      -0      No   Deuce                25 mcg =           [facility]

[facility]



               3-10           Wheat                0.5 mL,           l



               21:10:                               Injection,           [place]



               00                                 IV, Once,           



                                                  first dose           



                                                  03/10/16           



                                                  15:10:00           



                                                  CST, stop           



                                                  date           



                                                  03/10/16           



                                                  15:10:00           



                                                  CST            

 

     midazolam      -0      No   Deuce                0.5 mg =           Me

[facility]



               3-10           Wheat                0.5 mL,           l



               21:10:                               Injection,           [place]



               00                                 IV, Once,           



                                                  first dose           



                                                  03/10/16           



                                                  15:10:00           



                                                  CST, stop           



                                                  date           



                                                  03/10/16           



                                                  15:10:00           



                                                  CST            

 

     fentaNYL      -0      No   Deuce                25 mcg =           [facility]

[facility]



               3-10           Wheat                0.5 mL,           l



               21:10:                               Injection,           [place]



               00                                 IV, Once,           



                                                  first dose           



                                                  03/10/16           



                                                  15:10:00           



                                                  CST, stop           



                                                  date           



                                                  03/10/16           



                                                  15:10:00           



                                                  CST            

 

     midazolam      -0      No   Deuce                0.5 mg =           Me

[facility]



               3-10           Wheat                0.5 mL,           l



               21:10:                               Injection,           [place]



               00                                 IV, Once,           



                                                  first dose           



                                                  03/10/16           



                                                  15:10:00           



                                                  CST, stop           



                                                  date           



                                                  03/10/16           



                                                  15:10:00           



                                                  CST            

 

     fentaNYL      2016-0      No   Deuce                25 mcg =           [facility]

[facility]



               3-10           Wheat                0.5 mL,           l



               21:10:                               Injection,           [place]



               00                                 IV, Once,           



                                                  first dose           



                                                  03/10/16           



                                                  15:10:00           



                                                  CST, stop           



                                                  date           



                                                  03/10/16           



                                                  15:10:00           



                                                  CST            

 

     midazolam      -0      No   Deuce                0.5 mg =           Me

[facility]



               3-10           Wheat                0.5 mL,           l



               21:10:                               Injection,           [place]



               00                                 IV, Once,           



                                                  first dose           



                                                  03/10/16           



                                                  15:10:00           



                                                  CST, stop           



                                                  date           



                                                  03/10/16           



                                                  15:10:00           



                                                  CST            

 

     fentaNYL      -      No   Deuce                25 mcg =           [facility]

[facility]



               3-10           Wheat                0.5 mL,           l



               21:10:                               Injection,           [place]



               00                                 IV, Once,           



                                                  first dose           



                                                  03/10/16           



                                                  15:10:00           



                                                  CST, stop           



                                                  date           



                                                  03/10/16           



                                                  15:10:00           



                                                  CST            

 

     midazolam      -      No   Deuce                0.5 mg =           Me

[facility]



               3-10           Wheat                0.5 mL,           l



               21:10:                               Injection,           [place]



               00                                 IV, Once,           



                                                  first dose           



                                                  03/10/16           



                                                  15:10:00           



                                                  CST, stop           



                                                  date           



                                                  03/10/16           



                                                  15:10:00           



                                                  CST            

 

     fentaNYL      -      No   Deuce                25 mcg =           [facility]

[facility]



               3-10           Wheat                0.5 mL,           l



               21:10:                               Injection,           [place]



               00                                 IV, Once,           



                                                  first dose           



                                                  03/10/16           



                                                  15:10:00           



                                                  CST, stop           



                                                  date           



                                                  03/10/16           



                                                  15:10:00           



                                                  CST            

 

     midazolam      -0      No   Deuce                0.5 mg =           Me

[facility]



               3-10           Wheat                0.5 mL,           l



               21:10:                               Injection,           [place]



               00                                 IV, Once,           



                                                  first dose           



                                                  03/10/16           



                                                  15:10:00           



                                                  CST, stop           



                                                  date           



                                                  03/10/16           



                                                  15:10:00           



                                                  CST            

 

     fentaNYL      -0      No   Deuce                25 mcg =           [facility]

[facility]



               3-10           Wheat                0.5 mL,           l



               21:10:                               Injection,           [place]



               00                                 IV, Once,           



                                                  first dose           



                                                  03/10/16           



                                                  15:10:00           



                                                  CST, stop           



                                                  date           



                                                  03/10/16           



                                                  15:10:00           



                                                  CST            

 

     fentaNYL      -0      No   Deuce                25 mcg =           [facility]

[facility]



               3-10           Wheat                0.5 mL,           l



               21:05:                               Injection,           [place]



               00                                 IV, Once,           



                                                  first dose           



                                                  03/10/16           



                                                  15:05:00           



                                                  CST, stop           



                                                  date           



                                                  03/10/16           



                                                  15:05:00           



                                                  CST            

 

     midazolam      -0      No   Deuce                0.5 mg =           Me

[facility]



               3-10           Wheat                0.5 mL,           l



               21:05:                               Injection,           [place]



               00                                 IV, Once,           



                                                  first dose           



                                                  03/10/16           



                                                  15:05:00           



                                                  CST, stop           



                                                  date           



                                                  03/10/16           



                                                  15:05:00           



                                                  CST            

 

     fentaNYL      2016-0      No   Deuce                25 mcg =           [facility]

[facility]



               3-10           Wheat                0.5 mL,           l



               21:05:                               Injection,           [place]



               00                                 IV, Once,           



                                                  first dose           



                                                  03/10/16           



                                                  15:05:00           



                                                  CST, stop           



                                                  date           



                                                  03/10/16           



                                                  15:05:00           



                                                  CST            

 

     midazolam      2016-0      No   Deuce                0.5 mg =           Me

[facility]



               3-10           Wheat                0.5 mL,           l



               21:05:                               Injection,           [place]



               00                                 IV, Once,           



                                                  first dose           



                                                  03/10/16           



                                                  15:05:00           



                                                  CST, stop           



                                                  date           



                                                  03/10/16           



                                                  15:05:00           



                                                  CST            

 

     fentaNYL      2016-0      No   Deuce                25 mcg =           [facility]

[facility]



               3-10           Wheat                0.5 mL,           l



               21:05:                               Injection,           [place]



               00                                 IV, Once,           



                                                  first dose           



                                                  03/10/16           



                                                  15:05:00           



                                                  CST, stop           



                                                  date           



                                                  03/10/16           



                                                  15:05:00           



                                                  CST            

 

     midazolam      -0      No   Deuce                0.5 mg =           Me

[facility]



               3-10           Wheat                0.5 mL,           l



               21:05:                               Injection,           [place]



               00                                 IV, Once,           



                                                  first dose           



                                                  03/10/16           



                                                  15:05:00           



                                                  CST, stop           



                                                  date           



                                                  03/10/16           



                                                  15:05:00           



                                                  CST            

 

     fentaNYL      2016-0      No   Deuce                25 mcg =           [facility]

[facility]



               3-10           Wheat                0.5 mL,           l



               21:05:                               Injection,           [place]



               00                                 IV, Once,           



                                                  first dose           



                                                  03/10/16           



                                                  15:05:00           



                                                  CST, stop           



                                                  date           



                                                  03/10/16           



                                                  15:05:00           



                                                  CST            

 

     midazolam      -0      No   Deuce                0.5 mg =           Me

[facility]



               3-10           Wheat                0.5 mL,           l



               21:05:                               Injection,           [place]



               00                                 IV, Once,           



                                                  first dose           



                                                  03/10/16           



                                                  15:05:00           



                                                  CST, stop           



                                                  date           



                                                  03/10/16           



                                                  15:05:00           



                                                  CST            

 

     fentaNYL      2016-0      No   Deuce                25 mcg =           [facility]

[facility]



               3-10           Wheat                0.5 mL,           l



               21:05:                               Injection,           [place]



               00                                 IV, Once,           



                                                  first dose           



                                                  03/10/16           



                                                  15:05:00           



                                                  CST, stop           



                                                  date           



                                                  03/10/16           



                                                  15:05:00           



                                                  CST            

 

     midazolam      2016-0      No   Deuce                0.5 mg =           Me

[facility]



               3-10           Wheat                0.5 mL,           l



               21:05:                               Injection,           [place]



               00                                 IV, Once,           



                                                  first dose           



                                                  03/10/16           



                                                  15:05:00           



                                                  CST, stop           



                                                  date           



                                                  03/10/16           



                                                  15:05:00           



                                                  CST            

 

     fentaNYL      -      No   Deuce                25 mcg =           [facility]

[facility]



               3-10           Wheat                0.5 mL,           l



               21:05:                               Injection,           [place]



               00                                 IV, Once,           



                                                  first dose           



                                                  03/10/16           



                                                  15:05:00           



                                                  CST, stop           



                                                  date           



                                                  03/10/16           



                                                  15:05:00           



                                                  CST            

 

     midazolam            No   Deuce                0.5 mg =           Me

[facility]



               3-10           Wheat                0.5 mL,           l



               21:05:                               Injection,           [place]



               00                                 IV, Once,           



                                                  first dose           



                                                  03/10/16           



                                                  15:05:00           



                                                  CST, stop           



                                                  date           



                                                  03/10/16           



                                                  15:05:00           



                                                  CST            

 

     fentaNYL            No   Deuce                25 mcg =           [facility]

[facility]



               3-10           Wheat                0.5 mL,           l



               21:05:                               Injection,           [place]



               00                                 IV, Once,           



                                                  first dose           



                                                  03/10/16           



                                                  15:05:00           



                                                  CST, stop           



                                                  date           



                                                  03/10/16           



                                                  15:05:00           



                                                  CST            

 

     midazolam            No   Deuce                0.5 mg =           Me

[facility]



               3-10           Wheat                0.5 mL,           l



               21:05:                               Injection,           [place]



               00                                 IV, Once,           



                                                  first dose           



                                                  03/10/16           



                                                  15:05:00           



                                                  CST, stop           



                                                  date           



                                                  03/10/16           



                                                  15:05:00           



                                                  CST            

 

     clindamycin            No   Yoan                900 mg, IV        

   [facility]



               3-10           Lei                Piggyback,           l



               20:00:                               Once,           [place]



               00                                 infuse           



                                                  over 30           



                                                  minutes,           



                                                  first dose           



                                                  03/10/16           



                                                  14:00:00           



                                                  CST, stop           



                                                  date           



                                                  03/10/16           



                                                  14:00:00           



                                                  CST,           



                                                  Prophylaxi           



                                                  s              

 

     clindamycin            No   Yoan                900 mg, IV        

   [facility]



               3-10           Lei                Piggyback,           l



               20:00:                               Once,           [place]



               00                                 infuse           



                                                  over 30           



                                                  minutes,           



                                                  first dose           



                                                  03/10/16           



                                                  14:00:00           



                                                  CST, stop           



                                                  date           



                                                  03/10/16           



                                                  14:00:00           



                                                  CST,           



                                                  Prophylaxi           



                                                  s              

 

     clindamycin            No   Yoan                900 mg, IV        

   [facility]



               3-10           Lei                Piggyback,           l



               20:00:                               Once,           [place]



               00                                 infuse           



                                                  over 30           



                                                  minutes,           



                                                  first dose           



                                                  03/10/16           



                                                  14:00:00           



                                                  CST, stop           



                                                  date           



                                                  03/10/16           



                                                  14:00:00           



                                                  CST,           



                                                  Prophylaxi           



                                                  s              

 

     clindamycin      -0      No   Yoan                900 mg, IV        

   [facility]



               3-10           Lei                Piggyback,           l



               20:00:                               Once,           [place]



               00                                 infuse           



                                                  over 30           



                                                  minutes,           



                                                  first dose           



                                                  03/10/16           



                                                  14:00:00           



                                                  CST, stop           



                                                  date           



                                                  03/10/16           



                                                  14:00:00           



                                                  CST,           



                                                  Prophylaxi           



                                                  s              

 

     clindamycin      0      No   Yoan                900 mg, IV        

   [facility]



               3-10           Lei                Piggyback,           l



               20:00:                               Once,           [place]



               00                                 infuse           



                                                  over 30           



                                                  minutes,           



                                                  first dose           



                                                  03/10/16           



                                                  14:00:00           



                                                  CST, stop           



                                                  date           



                                                  03/10/16           



                                                  14:00:00           



                                                  CST,           



                                                  Prophylaxi           



                                                  s              

 

     clindamycin      0      No   Yoan                900 mg, IV        

   [facility]



               3-10           Lei                Piggyback,           l



               20:00:                               Once,           [place]



               00                                 infuse           



                                                  over 30           



                                                  minutes,           



                                                  first dose           



                                                  03/10/16           



                                                  14:00:00           



                                                  CST, stop           



                                                  date           



                                                  03/10/16           



                                                  14:00:00           



                                                  CST,           



                                                  Prophylaxi           



                                                  s              

 

     clindamycin      0      No   Yoan                900 mg, IV        

   [facility]



               3-10           Lei                Piggyback,           l



               20:00:                               Once,           [place]



               00                                 infuse           



                                                  over 30           



                                                  minutes,           



                                                  first dose           



                                                  03/10/16           



                                                  14:00:00           



                                                  CST, stop           



                                                  date           



                                                  03/10/16           



                                                  14:00:00           



                                                  CST,           



                                                  Prophylaxi           



                                                  s              

 

     LR 1,000 mL            No   Ghassan K                1,000 mL,          

 [facility]



               3-10           Chun                IV, 30           l



               19:27:                               mL/hr,                                            start date           



                                                  03/10/16           



                                                  13:27:00           



                                                  CST            

 

     Lidocaine            No   Ghassan K                0.2 mL,           Mem

[facility]



     2% 0.2 mL      3-10           Chun                Injection,           l



     IV Start      19:27:                               Subcutaneo           Her

[facility]



     [[facility]]      00                                 , Once           



                                                  PRN for           



                                                  other (see           



                                                  comment),           



                                                  first dose           



                                                  03/10/16           



                                                  13:27:00           



                                                  CST            

 

     LR 1,000 mL            No   Ghassan K                1,000 mL,          

 [facility]



               3-10           Chun                IV, 30           l



               19:27:                               mL/hr,           [place]                                 start date           



                                                  03/10/16           



                                                  13:27:00           



                                                  CST            

 

     Lidocaine            No   Ghassan K                0.2 mL,           Mem

[facility]



     2% 0.2 mL      3-10           Chun                Injection,           l



     IV Start      19:27:                               Subcutaneo           Her

[facility]



     [[facility]]      00                                 us, Once           



                                                  PRN for           



                                                  other (see           



                                                  comment),           



                                                  first dose           



                                                  03/10/16           



                                                  13:27:00           



                                                  CST            

 

     LR 1,000 mL            No   Ghassan K                1,000 mL,          

 [facility]



               3-10           Chun                IV, 30           l



               19:27:                               mL/hr,           [place]



                                                start date           



                                                  03/10/16           



                                                  13:27:00           



                                                  CST            

 

     Lidocaine            No   Ghassan K                0.2 mL,           Mem

[facility]



     2% 0.2 mL      3-10           Chun                Injection,           l



     IV Start      19:27:                               Subcutaneo           Her

hern[place]



     [[facility]]      00                                 us, Once           



                                                  PRN for           



                                                  other (see           



                                                  comment),           



                                                  first dose           



                                                  03/10/16           



                                                  13:27:00           



                                                  CST            

 

     LR 1,000 mL            No   Ghassan K                1,000 mL,          

 [facility]



               3-10           Chun                IV, 30           l



               19:27:                               mL/hr,           [place]                                 start date           



                                                  03/10/16           



                                                  13:27:00           



                                                  CST            

 

     Lidocaine            No   Ghassan K                0.2 mL,           Mem

[facility]



     2% 0.2 mL      3-10           Chun                Injection,           l



     IV Start      19:27:                               Subcutaneo           Her

[place]



     [[facility]]      00                                 us, Once           



                                                  PRN for           



                                                  other (see           



                                                  comment),           



                                                  first dose           



                                                  03/10/16           



                                                  13:27:00           



                                                  CST            

 

     LR 1,000 mL            No   Ghassan K                1,000 mL,          

 [facility]



               3-10           Chun                IV, 30           l



               19:27:                               mL/hr,           [place]                                 start date           



                                                  03/10/16           



                                                  13:27:00           



                                                  CST            

 

     Lidocaine            No   Ghassan K                0.2 mL,           Mem

[facility]



     2% 0.2 mL      3-10           Chun                Injection,           l



     IV Start      19:27:                               Subcutaneo           Her

hern[place]



     [[place]]      00                                 us, Once           



                                                  PRN for           



                                                  other (see           



                                                  comment),           



                                                  first dose           



                                                  03/10/16           



                                                  13:27:00           



                                                  CST            

 

     LR 1,000 mL            No   Ghassan K                1,000 mL,          

 [facility]



               3-10           Chun                IV, 30           l



               19:27:                               mL/hr,           [place]



                                                start date           



                                                  03/10/16           



                                                  13:27:00           



                                                  CST            

 

     Lidocaine            No   Ghassan K                0.2 mL,           Mem

[facility]



     2% 0.2 mL      3-10           Chun                Injection,           l



     IV Start      19:27:                               Subcutaneo           Her

hernandes



     [[place]]      00                                 us, Once           



                                                  PRN for           



                                                  other (see           



                                                  comment),           



                                                  first dose           



                                                  03/10/16           



                                                  13:27:00           



                                                  CST            

 

     LR 1,000 mL      -      No   Ghassan K                1,000 mL,          

 [facility]



               3-10           Chun                IV, 30           l



               19:27:                               mL/hr,           [place]



               00                                 start date           



                                                  03/10/16           



                                                  13:27:00           



                                                  CST            

 

     Lidocaine            No   Ghassan K                0.2 mL,           Mem

[facility]



     2% 0.2 mL      3-10           Chun                Injection,           l



     IV Start      19:27:                               Subcutaneo           Her

hernandes



     [[place]]      00                                 us, Once           



                                                  PRN for           



                                                  other (see           



                                                  comment),           



                                                  first dose           



                                                  03/10/16           



                                                  13:27:00           



                                                  CST            

 

     Seroquel            Yes                      100 mg,           [facility]

a



               3-09                               Oral,           l



               14:40:                               Daily, 0           [place]



               00                                 Refill(s),           



                                                  migraines           

 

     acetaminoph            Yes                      1 tabs,           Mem

[facility]



     en-HYDROcod      3-                               Oral,           l



     one 325      14:40:                               q6hr, 0           [place]



     mg-5 mg      00                                 Refill(s),           



     oral tablet                                         pain           

 

     traMADol 50            Yes                      50 mg = 1           M

[facility]



     mg oral      3-09                               tabs,           l



     tablet      14:40:                               Oral,           [place]



               00                                 q4hr, 0           



                                                  Refill(s),           



                                                  pain           

 

     amLODIPine-            Yes                      1 tabs,           [facility]

[facility]



     atorvastati      3-09                               Oral, qHS,           l



     n 5 mg-40      14:40:                               0              [facility]



     mg oral      00                                 Refill(s),           



     tablet                                         cholestero           



                                                  l/hyperten           



                                                  alexx           

 

     Osteo            Yes                      Oral,           [facility]



     Bi-Flex      3-09                               Daily, 0           l



               14:40:                               Refill(s),           [facility]



               00                                 supplement           

 

     Nature's            Yes                      1,000 mg =           [facility]

[facility]



     Bounty Red      3-09                               2 caps,           l



     Krill Oil      14:40:                               Oral, BID,           [facility]

[facility]



     500 mg oral      00                                 0              



     capsule                                         Refill(s),           



                                                  supplement           

 

     aspirin 81            Yes                      81 mg = 1           [facility]

[facility]



     mg oral      3-09                               tabs,           l



     tablet      14:40:                               Oral,           [place]



               00                                 Daily, 0           



                                                  Refill(s),           



                                                  supplement           

 

     Axert 12.5            Yes                      12.5 mg =           [facility]

[facility]



     mg oral      3-09                               1 tabs,           l



     tablet      14:40:                               Oral,           [facility]



               00                                 Once, PRN           



                                                  for            



                                                  migraine           



                                                  headache,           



                                                  may repeat           



                                                  dose once           



                                                  in 2           



                                                  hours, # 6           



                                                  tabs, 0           



                                                  Refill(s),           



                                                  migrainesm           



                                                  ay repeat           



                                                  dose once           



                                                  in 2 hours           

 

     Wellbutrin            Yes                      300 mg,           [facility]

[facility]



     XL        3-09                               Oral,           l



               14:40:                               q24hr, 0           [place]



               00                                 Refill(s),           



                                                  migraines           

 

     Seroquel            Yes                      100 mg,           [facility]

a



               3-09                               Oral,           l



               14:40:                               Daily, 0           [facility]



               00                                 Refill(s),           



                                                  migraines           

 

     acetaminoph            Yes                      1 tabs,           [facility]

[facility]



     en-HYDROcod      3-09                               Oral,           l



     one 325      14:40:                               q6hr, 0           [place]



     mg-5 mg      00                                 Refill(s),           



     oral tablet                                         pain           

 

     traMADol 50            Yes                      50 mg = 1           [facility]

[facility]



     mg oral      3-                               tabs,           l



     tablet      14:40:                               Oral,           [facility]



               00                                 q4hr, 0           



                                                  Refill(s),           



                                                  pain           

 

     amLODIPine-            Yes                      1 tabs,           [facility]

[facility]



     atorvastati      3-09                               Oral, qHS,           l



     n 5 mg-40      14:40:                               0              [place]



     mg oral      00                                 Refill(s),           



     tablet                                         cholestero           



                                                  l/hyperten           



                                                  alexx           

 

     Osteo            Yes                      Oral,           [facility]



     Bi-Flex      3-09                               Daily, 0           l



               14:40:                               Refill(s),           [facility]



               00                                 supplement           

 

     Nature's            Yes                      1,000 mg =           [facility]

[facility]



     Bounty Red      3-09                               2 caps,           l



     Krill Oil      14:40:                               Oral, BID,           [facility]

[facility]



     500 mg oral      00                                 0              



     capsule                                         Refill(s),           



                                                  supplement           

 

     aspirin 81            Yes                      81 mg = 1           [facility]

[facility]



     mg oral      3-09                               tabs,           l



     tablet      14:40:                               Oral,           [facility]



               00                                 Daily, 0           



                                                  Refill(s),           



                                                  supplement           

 

     Axert 12.5            Yes                      12.5 mg =           [facility]

[facility]



     mg oral      3-09                               1 tabs,           l



     tablet      14:40:                               Oral,           [place]



               00                                 Once, PRN           



                                                  for            



                                                  migraine           



                                                  headache,           



                                                  may repeat           



                                                  dose once           



                                                  in 2           



                                                  hours, # 6           



                                                  tabs, 0           



                                                  Refill(s),           



                                                  migrainesm           



                                                  ay repeat           



                                                  dose once           



                                                  in 2 hours           

 

     Wellbutrin            Yes                      300 mg,           [facility]

[facility]



     XL        3-09                               Oral,           l



               14:40:                               q24hr, 0           [place]



               00                                 Refill(s),           



                                                  migraines           

 

     Seroquel            Yes                      100 mg,           [facility]

a



               3-                               Oral,           l



               14:40:                               Daily, 0           [place]



               00                                 Refill(s),           



                                                  migraines           

 

     acetaminoph            Yes                      1 tabs,           [facility]

[facility]



     en-HYDROcod      3-                               Oral,           l



     one 325      14:40:                               q6hr, 0           [facility]



     mg-5 mg      00                                 Refill(s),           



     oral tablet                                         pain           

 

     traMADol 50            Yes                      50 mg = 1           [facility]

[facility]



     mg oral      3-09                               tabs,           l



     tablet      14:40:                               Oral,           [facility]



               00                                 q4hr, 0           



                                                  Refill(s),           



                                                  pain           

 

     amLODIPine-            Yes                      1 tabs,           [facility]

[facility]



     atorvastati      3-                               Oral, qHS,           l



     n 5 mg-40      14:40:                               0              [place]



     mg oral      00                                 Refill(s),           



     tablet                                         cholestero           



                                                  l/hyperten           



                                                  alexx           

 

     Osteo            Yes                      Oral,           [facility]



     Bi-Flex      3-09                               Daily, 0           l



               14:40:                               Refill(s),           [facility]



               00                                 supplement           

 

     Nature's            Yes                      1,000 mg =           [facility]

[facility]



     Bounty Red      3-09                               2 caps,           l



     Krill Oil      14:40:                               Oral, BID,           [facility]

[facility]



     500 mg oral      00                                 0              



     capsule                                         Refill(s),           



                                                  supplement           

 

     aspirin 81            Yes                      81 mg = 1           [facility]

[facility]



     mg oral      3-09                               tabs,           l



     tablet      14:40:                               Oral,           [facility]



               00                                 Daily, 0           



                                                  Refill(s),           



                                                  supplement           

 

     Axert 12.5            Yes                      12.5 mg =           [facility]

[facility]



     mg oral      3-09                               1 tabs,           l



     tablet      14:40:                               Oral,           [place]



               00                                 Once, PRN           



                                                  for            



                                                  migraine           



                                                  headache,           



                                                  may repeat           



                                                  dose once           



                                                  in 2           



                                                  hours, # 6           



                                                  tabs, 0           



                                                  Refill(s),           



                                                  migrainesm           



                                                  ay repeat           



                                                  dose once           



                                                  in 2 hours           

 

     Wellbutrin            Yes                      300 mg,           [facility]

[facility]



     XL        3-09                               Oral,           l



               14:40:                               q24hr, 0           [facility]



               00                                 Refill(s),           



                                                  migraines           

 

     Seroquel            Yes                      100 mg,           [facility]

a



               3-09                               Oral,           l



               14:40:                               Daily, 0           [place]



               00                                 Refill(s),           



                                                  migraines           

 

     acetaminoph            Yes                      1 tabs,           [facility]

[facility]



     en-HYDROcod      3-09                               Oral,           l



     one 325      14:40:                               q6hr, 0           [facility]



     mg-5 mg      00                                 Refill(s),           



     oral tablet                                         pain           

 

     traMADol 50            Yes                      50 mg = 1           [facility]

[facility]



     mg oral      3-                               tabs,           l



     tablet      14:40:                               Oral,           [facility]



               00                                 q4hr, 0           



                                                  Refill(s),           



                                                  pain           

 

     amLODIPine-            Yes                      1 tabs,           [facility]

[facility]



     atorvastati      3-09                               Oral, qHS,           l



     n 5 mg-40      14:40:                               0              [place]



     mg oral      00                                 Refill(s),           



     tablet                                         cholestero           



                                                  l/hyperten           



                                                  alexx           

 

     Osteo            Yes                      Oral,           [facility]



     Bi-Flex      3-                               Daily, 0           l



               14:40:                               Refill(s),           [facility]



               00                                 supplement           

 

     Nature's            Yes                      1,000 mg =           [facility]

[facility]



     Bounty Red      3-09                               2 caps,           l



     Krill Oil      14:40:                               Oral, BID,           [facility]

[facility]



     500 mg oral      00                                 0              



     capsule                                         Refill(s),           



                                                  supplement           

 

     aspirin 81            Yes                      81 mg = 1           [facility]

[facility]



     mg oral      3-                               tabs,           l



     tablet      14:40:                               Oral,           [facility]



               00                                 Daily, 0           



                                                  Refill(s),           



                                                  supplement           

 

     Axert 12.5            Yes                      12.5 mg =           [facility]

[facility]



     mg oral      3-09                               1 tabs,           l



     tablet      14:40:                               Oral,           [facility]



               00                                 Once, PRN           



                                                  for            



                                                  migraine           



                                                  headache,           



                                                  may repeat           



                                                  dose once           



                                                  in 2           



                                                  hours, # 6           



                                                  tabs, 0           



                                                  Refill(s),           



                                                  migrainesm           



                                                  ay repeat           



                                                  dose once           



                                                  in 2 hours           

 

     Wellbutrin            Yes                      300 mg,           [facility]

[facility]



     XL        3-09                               Oral,           l



               14:40:                               q24hr, 0           [place]



               00                                 Refill(s),           



                                                  migraines           

 

     Seroquel            Yes                      100 mg,           [facility]

a



               3-09                               Oral,           l



               14:40:                               Daily, 0           [facility]



               00                                 Refill(s),           



                                                  migraines           

 

     acetaminoph            Yes                      1 tabs,           [facility]

[facility]



     en-HYDROcod      3-09                               Oral,           l



     one 325      14:40:                               q6hr, 0           [place]



     mg-5 mg      00                                 Refill(s),           



     oral tablet                                         pain           

 

     traMADol 50            Yes                      50 mg = 1           [facility]

[facility]



     mg oral      3-09                               tabs,           l



     tablet      14:40:                               Oral,           [place]



               00                                 q4hr, 0           



                                                  Refill(s),           



                                                  pain           

 

     amLODIPine-            Yes                      1 tabs,           [facility]

[facility]



     atorvastati      3-                               Oral, qHS,           l



     n 5 mg-40      14:40:                               0              [place]



     mg oral      00                                 Refill(s),           



     tablet                                         cholestero           



                                                  l/hyperten           



                                                  alexx           

 

     Osteo            Yes                      Oral,           [facility]



     Bi-Flex      3-09                               Daily, 0           l



               14:40:                               Refill(s),           [place]



               00                                 supplement           

 

     Nature's            Yes                      1,000 mg =           [facility]

[facility]



     Bounty Red      3-09                               2 caps,           l



     Krill Oil      14:40:                               Oral, BID,           [facility]

[facility]



     500 mg oral      00                                 0              



     capsule                                         Refill(s),           



                                                  supplement           

 

     aspirin 81            Yes                      81 mg = 1           [facility]

[facility]



     mg oral      3-                               tabs,           l



     tablet      14:40:                               Oral,           [facility]



               00                                 Daily, 0           



                                                  Refill(s),           



                                                  supplement           

 

     Axert 12.5            Yes                      12.5 mg =           [facility]

[facility]



     mg oral      3-09                               1 tabs,           l



     tablet      14:40:                               Oral,           [facility]



               00                                 Once, PRN           



                                                  for            



                                                  migraine           



                                                  headache,           



                                                  may repeat           



                                                  dose once           



                                                  in 2           



                                                  hours, # 6           



                                                  tabs, 0           



                                                  Refill(s),           



                                                  migrainesm           



                                                  ay repeat           



                                                  dose once           



                                                  in 2 hours           

 

     Wellbutrin            Yes                      300 mg,           [facility]

[facility]



     XL        3-                               Oral,           l



               14:40:                               q24hr, 0           [place]



               00                                 Refill(s),           



                                                  migraines           

 

     Seroquel            Yes                      100 mg,           [facility]

[facility]



               3-09                               Oral,           l



               14:40:                               Daily, 0           [facility]



               00                                 Refill(s),           



                                                  migraines           

 

     acetaminoph            Yes                      1 tabs,           [facility]

[facility]



     en-HYDROcod      3-                               Oral,           l



     one 325      14:40:                               q6hr, 0           [place]



     mg-5 mg      00                                 Refill(s),           



     oral tablet                                         pain           

 

     traMADol 50            Yes                      50 mg = 1           [facility]

[facility]



     mg oral      3-09                               tabs,           l



     tablet      14:40:                               Oral,           [place]



               00                                 q4hr, 0           



                                                  Refill(s),           



                                                  pain           

 

     amLODIPine-            Yes                      1 tabs,           [facility]

[facility]



     atorvastati      3-09                               Oral, qHS,           l



     n 5 mg-40      14:40:                               0              [facility]



     mg oral      00                                 Refill(s),           



     tablet                                         cholestero           



                                                  l/hyperten           



                                                  alexx           

 

     Osteo            Yes                      Oral,           [facility]



     Bi-Flex      3-                               Daily, 0           l



               14:40:                               Refill(s),           [place]



               00                                 supplement           

 

     Nature's            Yes                      1,000 mg =           [facility]

[facility]



     Bounty Red      3-09                               2 caps,           l



     Krill Oil      14:40:                               Oral, BID,           He

rmann



     500 mg oral      00                                 0              



     capsule                                         Refill(s),           



                                                  supplement           

 

     aspirin 81            Yes                      81 mg = 1           [facility]

[facility]



     mg oral      3-09                               tabs,           l



     tablet      14:40:                               Oral,           [place]



               00                                 Daily, 0           



                                                  Refill(s),           



                                                  supplement           

 

     Axert 12.5            Yes                      12.5 mg =           [facility]

[facility]



     mg oral      3-09                               1 tabs,           l



     tablet      14:40:                               Oral,           [place]



               00                                 Once, PRN           



                                                  for            



                                                  migraine           



                                                  headache,           



                                                  may repeat           



                                                  dose once           



                                                  in 2           



                                                  hours, # 6           



                                                  tabs, 0           



                                                  Refill(s),           



                                                  migrainesm           



                                                  ay repeat           



                                                  dose once           



                                                  in 2 hours           

 

     Wellbutrin            Yes                      300 mg,           [facility]

[facility]



     XL        3-09                               Oral,           l



               14:40:                               q24hr, 0           [place]



               00                                 Refill(s),           



                                                  migraines           

 

     Seroquel            Yes                      100 mg,           [facility]

a



               3-09                               Oral,           l



               14:40:                               Daily, 0           [place]



               00                                 Refill(s),           



                                                  migraines           

 

     acetaminoph            Yes                      1 tabs,           [facility]

[facility]



     en-HYDROcod      3-09                               Oral,           l



     one 325      14:40:                               q6hr, 0           [place]



     mg-5 mg      00                                 Refill(s),           



     oral tablet                                         pain           

 

     traMADol 50            Yes                      50 mg = 1           [facility]

[facility]



     mg oral      3-09                               tabs,           l



     tablet      14:40:                               Oral,           [place]



               00                                 q4hr, 0           



                                                  Refill(s),           



                                                  pain           

 

     amLODIPine-            Yes                      1 tabs,           [facility]

[facility]



     atorvastati      3-                               Oral, qHS,           l



     n 5 mg-40      14:40:                               0              [place]



     mg oral      00                                 Refill(s),           



     tablet                                         cholestero           



                                                  l/hyperten           



                                                  alexx           

 

     Osteo      -      Yes                      Oral,           [facility]



     Bi-Flex      3-09                               Daily, 0           l



               14:40:                               Refill(s),           [place]



               00                                 supplement           

 

     Nature's            Yes                      1,000 mg =           [facility]

[facility]



     Bounty Red      3-09                               2 caps,           l



     Krill Oil      14:40:                               Oral, BID,           [facility]

[facility]



     500 mg oral      00                                 0              



     capsule                                         Refill(s),           



                                                  supplement           

 

     aspirin 81            Yes                      81 mg = 1           Me

[facility]



     mg oral      3-09                               tabs,           l



     tablet      14:40:                               Oral,           [place]



               00                                 Daily, 0           



                                                  Refill(s),           



                                                  supplement           

 

     Axert 12.5            Yes                      12.5 mg =           [facility]

[facility]



     mg oral      3-09                               1 tabs,           l



     tablet      14:40:                               Oral,           [place]



               00                                 Once, PRN           



                                                  for            



                                                  migraine           



                                                  headache,           



                                                  may repeat           



                                                  dose once           



                                                  in 2           



                                                  hours, # 6           



                                                  tabs, 0           



                                                  Refill(s),           



                                                  migrainesm           



                                                  ay repeat           



                                                  dose once           



                                                  in 2 hours           

 

     Wellbutrin            Yes                      300 mg,           [facility]

[facility]



     XL        3-09                               Oral,           l



               14:40:                               q24hr, 0           [place]



               00                                 Refill(s),           



                                                  migraines           







Immunizations







           Ordered Immunization Filled Immunization Date       Status     Commen

ts   Source



           Name       Name                                        

 

           FLUCELVAX QUAD             2022 Completed             [facility]

[facility]



                                 00:00:00                         [facility]

 

           Bebtelovimab            2022 Completed             [facility]



                                 00:00:00                         [facility]

 

           FLUCELVAX QUAD             2021-10-05 Completed             [facility]

[facility]



                                 00:00:00                         [facility]

 

           FLUCELVAX QUAD             2020 Completed             [facility]

[facility]



                                 [facility]00                         [facility]

 

           Hx influenza            2019-10-23 Completed             [facility]

[facility]



           vaccine-unspecified<            00:00:00                         



           sup>1</sup>                                             

 

           Hx influenza            2019-10-23 Completed             [facility]

[facility]



           vaccine-unspecified<            00:00:00                         



           sup>1</sup>                                             

 

           Hx influenza            2019-10-23 Completed             [facility]

[facility]



           vaccine-unspecified<            00:00:00                         



           sup>1</sup>                                             

 

           Hx influenza            2019-10-23 Completed             [facility]

[facility]



           vaccine-unspecified<            00:00:00                         



           sup>1</sup>                                             

 

           Hx influenza            2019-10-23 Completed             [facility]

[facility]



           vaccine-unspecified<            00:00:00                         



           sup>1</sup>                                             

 

           Hx influenza            2019-10-23 Completed             [facility]

[facility]



           vaccine-unspecified<            00:00:00                         



           sup>1</sup>                                             

 

           FLUCELVAX QUAD             2019-10-23 Completed             [facility]

[facility]



                                 [facility]:00                         [facility]

 

           Hx influenza            2019-10-23 Completed             [facility]

[facility]



           vaccine-unspecified<            00:00:00                         



           sup>1</sup>                                             

 

           Tdap                  2019 Completed             [facility]



                                 [facility]00:00                         [facility]

 

           pneumococcal            2019 Completed             [facility]

[facility]



           23-valent             00:00:00                         



           vaccine<sup>3</sup>                                             

 

           pneumococcal            2019 Completed             [facility]

[facility]



           23-valent             00:00:00                         



           vaccine<sup>3</sup>                                             

 

           pneumococcal            2019 Completed             [facility]

[facility]



           23-valent             00:00:00                         



           vaccine<sup>3</sup>                                             

 

           pneumococcal            2019 Completed             [facility]

[facility]



           23-valent             00:00:00                         



           vaccine<sup>3</sup>                                             

 

           pneumococcal            2019 Completed             [facility]

[facility]



           23-valent             00:00:00                         



           vaccine<sup>3</sup>                                             

 

           pneumococcal            2019 Completed             [facility]

[facility]



           23-valent             00:00:00                         



           vaccine<sup>3</sup>                                             

 

           pneumococcal            2019 Completed             [facility]

[facility]



           23-valent             00:00:00                         



           vaccine<sup>3</sup>                                             

 

           Hx influenza            2011-10-25 Completed             [facility]

[facility]



           vaccine-unspecified<            14:42:42                         



           sup>1</sup>                                             

 

           Hx influenza            2011-10-25 Completed             [facility]

[facility]



           vaccine-unspecified<            14:42:42                         



           sup>2</sup>                                             

 

           Hx influenza            2011-10-25 Completed             [facility]

[facility]



           vaccine-unspecified<            14:42:42                         



           sup>1</sup>                                             

 

           Hx influenza            2011-10-25 Completed             [facility]

[facility]



           vaccine-unspecified<            14:42:42                         



           sup>2</sup>                                             

 

           Hx influenza            2011-10-25 Completed             [facility]

[facility]



           vaccine-unspecified<            14:42:42                         



           sup>1</sup>                                             

 

           Hx influenza            2011-10-25 Completed             [facility]

[facility]



           vaccine-unspecified<            14:42:42                         



           sup>2</sup>                                             

 

           Hx influenza            2011-10-25 Completed             [facility]

[facility]



           vaccine-unspecified<            14:42:42                         



           sup>1</sup>                                             

 

           Hx influenza            2011-10-25 Completed             [facility]

[facility]



           vaccine-unspecified<            14:42:42                         



           sup>2</sup>                                             

 

           Hx influenza            2011-10-25 Completed             [facility]

[facility]



           vaccine-unspecified<            14:42:42                         



           sup>1</sup>                                             

 

           Hx influenza            2011-10-25 Completed             [facility]

[facility]



           vaccine-unspecified<            14:42:42                         



           sup>2</sup>                                             

 

           Hx influenza            2011-10-25 Completed             [facility]

[facility]



           vaccine-unspecified<            14:42:42                         



           sup>1</sup>                                             

 

           Hx influenza            2011-10-25 Completed             [facility]

[facility]



           vaccine-unspecified<            14:42:42                         



           sup>2</sup>                                             

 

           Hx influenza            2011-10-25 Completed             [facility]

[facility]



           vaccine-unspecified<            14:42:42                         



           sup>1</sup>                                             

 

           Hx influenza            2011-10-25 Completed             [facility]

[facility]



           vaccine-unspecified<            14:42:42                         



           sup>2</sup>                                             







Vital Signs







             Vital Name   Observation Time Observation Value Comments     Source

 

             WEIGHT       2022 11:28:00 98.2 kg                   

 

             WEIGHT       2022 07:30:00 101 kg                    

 

             HEIGHT       2022 16:00:00 170.2 cm                  

 

             WEIGHT       2022 16:00:00 98.9 kg                   

 

             WEIGHT       2022 11:28:00 98.2 kg                   

 

             WEIGHT       2022 07:30:00 101 kg                    

 

             HEIGHT       2022 16:00:00 170.2 cm                  

 

             WEIGHT       2022 16:00:00 98.9 kg                   

 

             WEIGHT       2022 11:28:00 98.2 kg                   

 

             WEIGHT       2022 07:30:00 101 kg                    

 

             HEIGHT       2022 16:00:00 170.2 cm                  

 

             WEIGHT       2022 16:00:00 98.9 kg                   

 

             Systolic blood 2022 08:05:00 133 mm[Hg]                [facility]

 

             Diastolic blood 2022 08:05:00 87 mm[Hg]                 [facility]

 

             Heart rate   2022 08:05:00 104 /min                  [facility]

 

             Body temperature 2022 08:05:00 35.56 Sher[place]                 [facility]

 

             Respiratory rate 2022 08:05:00 20 /min                   [facility]

 

             Oxygen saturation in 2022 08:05:00 98 /min                   

[facility]



             Arterial blood by                                        [facility]

nter



             Pulse oximetry                                        

 

             Body weight  2022 11:28:00 98.2 kg                   [facility]

 

             BMI          2022 11:28:00 33.91 kg/m2               [facility]

 

             Body height  2022 16:00:00 170.2 cm                  [facility]

 

             Systolic blood 2022 21:24:34 114 mm[Hg]                [facility]



             pressure                                            

 

             Diastolic blood 2022 21:24:34 57 mm[Hg]                 [facility]



             pressure                                            

 

             Heart rate   2022 21:24:34 87 /min                   [facility]

 

             Body temperature 2022 21:24:34 36.61 Sherlyn                 [facility]

 

             Respiratory rate 2022 21:24:34 18 /min                   [facility]

 

             Oxygen saturation in 2022 21:24:34 100 /min                  

[facility]



             Arterial blood by                                        



             Pulse oximetry                                        

 

             Body height  2022 05:00:00 170.2 cm                  [facility]

 

             Body weight  2022 05:00:00 107.2 kg                  [facility]

 

             BMI          2022 05:00:00 37.02 kg/m2               [facility]

 

             Temperature Oral (F) 2022 19:52:00 97.6 F                    

[facility] [place]

 

             Height       2022 19:52:00 170.18 cm                 [facility]

 [place]

 

             Weight       2022 19:52:00                           [facility]

 [place]

 

             BMI Calculated 2022 19:52:00                           [facility]

[facility] [place]

 

             Heart Rate   2021 21:23:00                           [facility]

 [place]

 

             Systolic (mm Hg) 2021 21:23:00                           [facility]

[facility] [place]

 

             Diastolic (mm Hg) 2021 21:23:00                           [facility]

[facility] [place]

 

             Height       2021 21:23:00 165.1 cm                  [facility]

 [place]

 

             Weight       2021 21:23:00                           [facility]

 [place]

 

             BMI Calculated 2021 21:23:00                           [facility]

al [place]

 

             Heart Rate   2021 20:12:00                           [facility]

 [place]

 

             Heart Rate   2021 20:08:00                           [facility]

 [place]

 

             Systolic (mm Hg) 2021 20:08:00                           [facility]

[facility] [place]

 

             Diastolic (mm Hg) 2021 20:08:00                           [facility]

[facility] [place]

 

             Height       2021 20:08:00 165.1 cm                  [facility]

 [place]

 

             Weight       2021 20:08:00                           [facility]

 [place]

 

             BMI Calculated 2021 20:08:00                           [facility]

al [place]

 

             Systolic (mm Hg) 2020 15:59:00                           [facility]

[facility] [place]

 

             Diastolic (mm Hg) 2020 15:59:00                           [facility]

[facility] [place]

 

             Heart Rate   2020 15:59:00                           [facility]

 [place]

 

             Height       2020 15:59:00 165.1 cm                  [facility]

 [place]

 

             Weight       2020 15:59:00                           [facility]

 [place]

 

             BMI Calculated 2020 15:59:00                           [facility]

al [place]

 

             Systolic (mm Hg) 2020 20:42:00                           [facility]

[facility] [place]

 

             Diastolic (mm Hg) 2020 20:42:00                           [facility]

[facility] [place]

 

             Heart Rate   2020 20:42:00                           [facility]

 [place]

 

             Temperature Oral (F) 2020 20:42:00 98.2 F                    

[facility] [place]

 

             Height       2020 20:42:00 170.18 cm                 [facility]

 [place]

 

             Weight       2020 20:42:00                           [facility]

 [place]

 

             BMI Calculated 2020 20:42:00                           [facility]

al [place]

 

             Systolic (mm Hg) 2019 21:53:00                           [facility]

[facility] [place]

 

             Diastolic (mm Hg) 2019 21:53:00                           [facility]

[facility] [place]

 

             Heart Rate   2019 21:53:00                           [facility]

 [place]

 

             Temperature Oral (F) 2019 21:53:00 97.9 F                    

[facility] [place]

 

             Height       2019 21:53:00 165.1 cm                  [facility]

 [place]

 

             Weight       2019 21:53:00                           [facility]

 [place]

 

             BMI Calculated 2019 21:53:00                           [facility]

[facility] [place]

 

             Height       2019-10-25 14:54:00 165.1 cm                  [facility]

 [place]

 

             Weight       2019-10-25 14:54:00                           [facility]

 [place]

 

             BMI Calculated 2019-10-25 14:54:00                           [facility]

[facility] [place]

 

             Systolic (mm Hg) 2019-10-25 14:54:00                           [facility]

[facility] [place]

 

             Diastolic (mm Hg) 2019-10-25 14:54:00                           [facility]

[facility] [place]

 

             Heart Rate   2019-10-25 14:54:00                           [facility]

 [place]

 

             Temperature Oral (F) 2019-10-25 14:54:00 97.6 F                    

[facility] [place]

 

             Systolic (mm Hg) 2019-10-10 15:37:00                           [facility]

[facility] [place]

 

             Diastolic (mm Hg) 2019-10-10 15:37:00                           [facility]

[facility] [place]

 

             Heart Rate   2019-10-10 15:37:00                           [facility]

 [place]

 

             Temperature Oral (F) 2019-10-10 15:37:00 98.2 F                    

[facility] [place]

 

             Height       2019-10-10 15:37:00 165.1 cm                  [facility]

 [place]

 

             Weight       2019-10-10 15:37:00                           [facility]

 [place]

 

             BMI Calculated 2019-10-10 15:37:00                           [facility]

al [place]

 

             Weight       2019 15:18:00                           [facility]

 [place]

 

             BMI Calculated 2019 15:18:00                           [facility]

[facility] [place]

 

             Height       2019 15:18:00 165.1 cm                  [facility]

 [place]

 

             Temperature Oral (F) 2019 15:18:00 98.4 F                    

[facility] [place]

 

             Heart Rate   2019 15:18:00                           [facility]

 [place]

 

             Systolic (mm Hg) 2019 15:18:00                           [facility]

[facility] [place]

 

             Diastolic (mm Hg) 2019 15:18:00                           [facility]

[facility] [place]

 

             Height       2019 19:16:00 165.1 cm                  [facility]

 [place]

 

             BMI Calculated 2019 19:16:00                           [facility]

[facility] [place]

 

             Weight       2019 19:16:00                           [facility]

 [place]

 

             Heart Rate   2019 19:16:00                           [facility]

 [place]

 

             Systolic (mm Hg) 2019 19:16:00                           [facility]

[facility] [place]

 

             Diastolic (mm Hg) 2019 19:16:00                           [facility]

[facility] [place]

 

             Height       2019 14:26:00 167.01 cm                 [facility]

 [place]

 

             BMI Calculated 2019 14:26:00                           [facility]

[facility] [place]

 

             Weight       2019 14:26:00                           [facility]

 [place]

 

             Heart Rate   2019 14:26:00                           [facility]

 [place]

 

             Temperature Oral (F) 2019 14:26:00 97.9 F                    

[facility]

 

             Systolic (mm Hg) 2019 14:26:00                           [facility]

[facility] [place]

 

             Diastolic (mm Hg) 2019 14:26:00                           [facility]

[facility] [place]

 

             Systolic (mm Hg) 2018 18:33:00                           [facility]

[facility] [place]

 

             Diastolic (mm Hg) 2018 18:33:00                           [facility]

[facility] [place]

 

             Heart Rate   2018 18:33:00                           [facility]

 [place]

 

             Weight       2018 18:33:00                           [facility]

 [place]

 

             BMI Calculated 2018 18:31:00                           [facility]

[facility] [place]

 

             Weight       2018 18:31:00                           [facility]

 [place]

 

             Systolic (mm Hg) 2018 18:31:00                           [facility]

[facility] [place]

 

             Diastolic (mm Hg) 2018 18:31:00                           [facility]

[facility] [place]

 

             Height       2018 18:31:00 170.18 cm                 [facility]

 [place]

 

             Temperature Oral (F) 2018 18:31:00 98.3 F                    

[facility] [place]

 

             Heart Rate   2018 18:31:00                           [facility]

 [place]

 

             Weight       2018 16:14:00                           [facility]

 [place]

 

             Height       2018 16:14:00 170.18 cm                 [facility]

 [place]

 

             BMI Calculated 2018 16:14:00                           [facility]

[facility] [place]

 

             Heart Rate   2018 16:14:00                           [facility]

 [place]

 

             Systolic (mm Hg) 2018 16:14:00                           [facility]

[facility] [place]

 

             Diastolic (mm Hg) 2018 16:14:00                           [facility]

[facility] [place]

 

             BMI Calculated 2018 15:06:00                           [facility]

[facility] [place]

 

             Height       2018 15:06:00 170.18 cm                 [facility]

 [place]

 

             Weight       2018 15:06:00                           [facility]

 [place]

 

             Systolic (mm Hg) 2018 15:06:00                           [facility]

[facility] [place]

 

             Diastolic (mm Hg) 2018 15:06:00                           [facility]

[facility] [place]

 

             Heart Rate   2018 15:06:00                           [facility]

 [place]

 

             Temperature Oral (F) 2018 15:06:00 97.9 F                    

[facility] [place]

 

             Weight       2017 16:17:00                           [facility]

 [place]

 

             Height       2017 16:17:00 170.18 cm                 [facility]

 [place]

 

             BMI Calculated 2017 16:17:00                           [facility]

[facility] [place]

 

             Respitory Rate 2016 01:25:00                           [facility]

[facility] [place]

 

             Systolic (mm Hg) 2016 00:50:00                           [facility]

[facility] [place]

 

             Respitory Rate 2016 00:50:00                           [facility]

[facility] [place]

 

             Heart Rate   2016 00:50:00                           [facility]

 [place]

 

             Systolic (mm Hg) 2016 00:40:00                           [facility]

[facility] [place]

 

             Respitory Rate 2016 00:40:00                           [facility]

[facility] [place]

 

             Heart Rate   2016 00:40:00                           [facility]

 [place]

 

             Heart Rate   2016 00:30:00                           [facility]

 [place]

 

             Systolic (mm Hg) 2016 00:30:00                           [facility]

[facility] [place]

 

             Temperature Oral (F) 2016-03-10 23:50:00 37.1 Sherlyn                  

[facility] [place]

 

             Height       2016-03-10 19:23:00 169 cm                    [facility]

 [place]

 

             Weight       2016-03-10 19:23:00                           [facility]

 [place]

 

             Temperature Oral (F) 2016-03-10 19:23:00 36.6 Sherlyn                  

[facility] [place]

 

             Height       2016 14:13:00 170 cm                    [facility]

 [place]

 

             Weight       2016 14:13:00                           [facility]

 [place]







Procedures







                Procedure       Date / Time     Performing Clinician Source



                                Performed                       

 

                ULTRAFILTRATION HD CRRT 2022 18:40:00 Jeanie Canada

 [facility]

 

                ECG 12-LEAD     2022 10:57:31 Unknown, 7 [facility]

 

                ECG 12-LEAD     2022 10:57:31 Unknown, Hl7 [facility]

 

                NM MYOCARDIAL PERFUSION 2022 10:55:00 Luma Beckham  [facility]



                PET/CT (REST & STRESS)                                 Medical C

enter

 

                TREADMILL       2022 10:49:30 Unknown, 7 [facility]



                TOLERANCE(NON-NUCLEAR                                 Medical Ce

nter



                TREADMILL)                                      

 

                ECG 12-LEAD     2022 10:49:10 Unknown, 7 [facility]

 

                ECG 12-LEAD     2022 10:49:10 Unknown, 7 [facility]

 

                CBC W/PLT COUNT & AUTO 2022 03:40:00 Vinayak Broussard  [facility]

 

                BASIC METABOLIC PANEL [facility]22 [facility]40[facility]

 

                MAGNESIUM       2022 03:40:00 Aarti [facility]

 

                PHOSPHORUS      2022 03:40:00 Aarti [facility]

 

                CBC W/PLT COUNT & AUTO 2022 03:40:00 David Godwin [facility]

 

                HEPATITIS B SURFACE 2022 08:07:00 Capo Squires  [facility]

 

                HEMODIALYSIS INPATIENT 2022 07:15:26 Bo Corado [facility]

 

                CBC W/PLT COUNT & AUTO 2022 04:40:00 Aarti [facility]

 

                BASIC METABOLIC PANEL 2022 04:40:00 Aarti [facility]

 

                MAGNESIUM       2022 04:40:00 Aarti [facility]

 

                PHOSPHORUS      2022 04:40:00 Aarti [facility]

 

                HEPATITIS C ANTIBODY 2022 04:40:00 Sanket [place] [facility]

[facility]

 

                PERIPHERAL BLOOD SMEAR - 2022 04:40:00 Sanket [place]

a [facility]



                PATHOLOGIST REVIEW                                 Medical [facility]

 

                CBC W/PLT COUNT & AUTO 2022 04:40:00 David Godwin [facility]

 

                2D ECHO W/ DOPPLER 2022 15:49:57 David Godwin [facility]



                (CW/PW/COLOR)                                   [facility]

 

                CBC W/PLT COUNT & AUTO 2022 04:30:00 David Godwin [facility]

 

                CBC W/PLT COUNT & AUTO 2022 04:30:00 Vinayak Broussard  [facility]

 

                BASIC METABOLIC PANEL 2022 04:30:00 Aarti [facility]

 

                MAGNESIUM       2022 04:30:00 Aarti [facility]

 

                PHOSPHORUS      2022 [facility]:30[facility]

 

                PROTEIN ELECTROPHORESIS, 2022 04:30:00 Luma Beckham  [facility]

 

                HIGH SENSITIVITY TROPONIN 2022 18:09:00 Celli, David Ivelisse

[facility]

 

                POTASSIUM       2022 18:09:00 Aarti [facility]

 

                XR CHEST 1 VIEW PORTABLE / 2022 15:31:00 CelliDavid [facility]

 

                CBC W/PLT COUNT & AUTO 2022 15:15:00 CelliDavid [facility]

 

                COMPREHENSIVE METABOLIC 2022 15:15:00 CelliDavid

[facility]

 

                MAGNESIUM       2022 15:15:00 [facility]

 

                PHOSPHORUS      2022 15:15:00 Celli [facility]

 

                HIGH SENSITIVITY TROPONIN 2022 15:15:00 CelliDavid Ivelisse

[facility]

 

                B-TYPE NATRIURETIC FACTOR 2022 15:15:00 CelliDavid [facility]



                (BNP)                                           Medical [place]

 

                CBC W/PLT COUNT & AUTO 2022 15:15:00 CelliDavid [facility]

 

                TSH             2022 15:14:00 [facility]

 

                ECG 12-LEAD     2022 14:50:19 Unknown, Hl7 [facility]

 

                ECG 12-LEAD     2022 14:50:19 Unknown, Hl7 [facility]

 

                ECG 12-LEAD     2022 14:50:19 Unknown, Hl7 [facility]

 

                PROTHROMBIN TIME WITH INR 2022 06:57:00 Alisia Reyes   [facility] METABOLIC 2022 06:57:00 Alisia Reyes   [facility]



                PANEL                                           

 

                MAGNESIUM LEVEL 2022 06:57:00 StephenAlisia padilla Ho

spital

 

                PHOSPHORUS LEVEL 2022 06:57:00 Alisia Reyes H

ospital

 

                ESTIMATED GFR   2022 06:57:00 Alisia Reyes Ho

spital

 

                TROPONIN T      2022 06:57:00 Candis Reyesmashala Vernon 

spital

 

                ZZCOVID-19 ANTI-SPIKE IGG 2022 06:56:00 Stephen [facility]



                ANTIBODY TITER                                  

 

                COVID-19 SEROLOGY PATIENT 2022 06:56:00 Stephen [facility]



                SURVEILLANCE                                    

 

                HC COMPLETE BLD COUNT 2022 06:56:00 Alisia Reyes   [facility]



                W/AUTO DIFF                                     

 

                COVID-19 QUALITATIVE 2022 04:58:00 [facility]



                RT-PCR                                          

 

                TROPONIN T      2022 03:58:00 Alisia Reyes 

spital

 

                XR CHEST 1 VW PORTABLE 2022 01:31:25 [facility] 2022 01:20:00 [facility]



                PANEL                                           

 

                LIPASE LEVEL    2022 01:20:00 [facility]

 

                TROPONIN T      2022 01:20:00 Alisia Reyes 

spital

 

                B NATRIURETIC PEPTIDE 2022 01:20:00 [facility]

 

                HC COMPLETE BLD COUNT 2022 01:20:00 [facility]



                W/AUTO DIFF                                     

 

                ESTIMATED GFR   2022 01:20:00 [facility]

 

                ECG 12-LEAD     2022 01:03:45 Alisia Reyes 

spital

 

                COMPREHENSIVE METABOLIC 2022 16:47:00 [facility]



                PANEL                           Baljinder       

 

                CBC WITH PLATELET AND 2022 16:47:00 [facility]



                DIFFERENTIAL                    Baljinder       

 

                THYROID STIMULATING 2022 16:47:00 [facility]



                HORMONE                         Baljinder       

 

                PARATHYROID HORMONE 2022 16:47:00 [facility]



                                                Baljinder       

 

                VITAMIN D 25 HYDROXY LEVEL 2022 16:47:00 [facility]



                                                Baljinder       

 

                NM BONE SCAN 3 PHASE 2022-10-05 18:11:00 [facility]



                                                Baljinder       

 

                BONE DENSITY    2022-10-05 14:02:48 [facility]

spital



                                                Baljinder       

 

                BONE DENSITY PERIPHERAL 2022-10-05 14:02:48 [facility]



                                                Baljinder       

 

                POC GLUCOSE     2022 04:49:00 Martina Douglass 

spital

 

                CBC HEMOGRAM    2022 10:05:00 [facility]

 

                BASIC METABOLIC PANEL 2022 10:05:00 [facility] Sanket [facility]

 

                ESTIMATED GFR   2022 10:05:00 [facility]



                                                Baljinder       

 

                XR LUMBAR SPINE 2 OR 3 VW 2022 21:10:24 [facility]  [facility]

 

                XR THORACIC SPINE 2 VW 2022 21:10:08 Jassi, Sanket [facility]

 

                XR CHEST 1 VW PORTABLE 2022 19:05:29 As[address] Adil [facility]

 

                BASIC METABOLIC PANEL 2022 09:08:00 [facility]

 

                PHOSPHORUS LEVEL 2022 09:08:00 [facility]

 

                ESTIMATED GFR   2022 09:08:00 [facility]

 

                TTE COMPLETE, WO CONTRAST, 2022 15:40:58 [facility]



                W DOPPLER ([id number])                                 

 

                MRI THORACIC SPINE WO 2022 02:11:31 Adriel Sparks [facility]



                CONTRAST                        Baljinder       

 

                MRI LUMBAR SPINE WO 2022 01:29:36 Quinton Centeno [facility]



                CONTRAST                        [facility]   

 

                HEPATITIS B CORE ANTIBODY 2022 17:34:00 Francine Fishman [facility]



                TOTAL                                           

 

                HEPATITIS B SURFACE AB, 2022 17:34:00 Francine Fishman

[facility]



                QUANTITATIVE                                    

 

                HEPATITIS B SURFACE 2022 17:34:00 [facility]



                ANTIGEN                                         

 

                COVID-19 QUALITATIVE 2022 16:35:00 Quinton Centeno [facility]



                RT-PCR                          [facility]   

 

                COVID-19 OMICRON VARIANT 2022 16:35:00 Quinton Centeno

 [facility]



                QUALITATIVE RT-PCR                 [facility]   

 

                POC GLUCOSE     2022 16:35:00 [facility]

[facility]ital

 

                POC GLUCOSE     2022 15:09:00 Quinton Centeno [facility]   

 

                CT LUMBAR SPINE WO 2022 13:41:51 [address]inton Centeno [facility]



                CONTRAST                        [facility]   

 

                CT RENAL STONE PROTOCOL 2022 13:41:38 Quinton Centeno 

[facility]   

 

                HC COMPLETE BLD COUNT 2022 12:44:00 Quinton Centeno [facility]



                W/AUTO DIFF                     [facility]   

 

                COMPREHENSIVE METABOLIC 2022 12:44:00 [facility]Felipe [facility] Center[facility] 

[facility]



                PANEL                           [facility]   

 

                ESTIMATED GFR   2022 12:44:00 [facility]   

 

                HI CRITICAL CARE, E/M 2022 12:28:33 Quinton Centeno [facility]



                30-74 MINUTES                   [facility]   

 

                HEPATITIS B SURFACE 2022 15:33:00                 [facility]



                ANTIGEN                                         [facility]

 

                HEPATITIS B SURFACE 2022 15:33:00                 [facility]



                ANTIBODY                                        [facility]

 

                XR CHEST 1 VW PORTABLE 2022 13:25:18 Darline Blantonya    Vanessa

[facility]

 

                COMPREHENSIVE METABOLIC 2022 10:32:00 Luc [facility]



                PANEL                                           

 

                HC COMPLETE BLD COUNT 2022 10:32:00 [facility]



                W/AUTO DIFF                                     

 

                ESTIMATED GFR   2022 10:32:00 Luc [facility]

 

                HEMODIALYSIS    2022 05:06:40 Baranolew-DacJaneen cade 

ospital



                                                [facility]        

 

                HEPATITIS B CORE ANTIBODY 2022 20:59:00 Galo, [facility]



                TOTAL                           [facility]        

 

                HEPATITIS B CORE ANTIBODY 2022 20:59:00 Galo [facility]



                IGM                             [facility]        

 

                HEPATITIS B SURFACE 2022 20:59:00 Galo [facility]



                ANTIGEN                         [facility]        

 

                HEPATITIS B SURFACE AB, 2022 20:58:00 Galo [facility]



                QUANTITATIVE                    Gabriela        

 

                HEMODIALYSIS    2022 15:58:20 Baranolew-Cristiano Gallegosist H

ospital



                                                [facility]        

 

                TROPONIN T      2022 15:44:00 Luc [facility]

 

                XR CHEST 1 VW PORTABLE 2022 11:41:07 [facility]

 

                TROPONIN T      2022 11:41:00 [facility]

 

                HC COMPLETE BLD COUNT 2022 11:41:00 Luc [facility]



                W/AUTO DIFF                                     

 

                COMPREHENSIVE METABOLIC 2022 11:41:00 [facility]



                PANEL                                           

 

                PROTHROMBIN TIME WITH INR 2022 11:41:00 Elenita Calle [facility]

 

                PARTIAL THROMBOPLASTIN 2022 11:41:00 [facility]



                TIME (PTT)                                      

 

                B NATRIURETIC PEPTIDE 2022 11:41:00 [facility]

 

                PROCALCITONIN   2022 11:41:00 [facility]

 

                CREATINE KINASE, TOTAL 2022 11:41:00 [facility]



                (CPK)                                           

 

                C-REACTIVE PROTEIN 2022 11:41:00 [facility]

 

                INTERLEUKIN 6   2022 11:41:00 [facility]

 

                FERRITIN LEVEL  2022 11:41:00 [facility]

 

                D-DIMER         2022 11:41:00 [facility]

 

                LDH             2022 11:41:00 [facility]

 

                FIBRINOGEN      2022 11:41:00 [facility]

 

                ESTIMATED GFR   2022 11:41:00 [facility]

 

                RESPIRATORY PATHOGEN PANEL 2022 09:45:00 Louann Jaime 

[facility]



                WITH COVID-19 RT-PCR                 All         

 

                XR CHEST 1 VW   2022 08:05:46 Louann Jaime         

 

                BLOOD CULTURE, AEROBIC & 2022 07:49:00 Louann Jaime [facility]



                ANAEROBIC                       All         

 

                HC COMPLETE BLD COUNT 2022 07:49:[address] Louann Jaime [facility]



                W/AUTO DIFF                     All         

 

                COMPREHENSIVE METABOLIC 2022 07:49:00 Louann Jaime [facility]



                PANEL                           All         

 

                TROPONIN T      2022 07:49:00 [facility]

 

                B NATRIURETIC PEPTIDE 2022 07:49:00 [facility]



                                                All         

 

                ESTIMATED GFR   2022 07:49:00 Louann Jaime         

 

                ECG ED PRELIMINARY [facility]22 [facility]26[facility]13 [facility]



                INTERPRETATION                  All         

 

                ECG 12-LEAD     2022 07:20:02 Louann Jaime [facility]

ospital



                                                [place]         

 

                CT ABDOMEN PELVIS WO 2022 23:38:27 Mahamed [facility]



                CONTRAST                                        

 

                HC COMPLETE BLD COUNT 2022 23:14:00 Mahamed [facility]



                W/AUTO DIFF                                     

 

                COMPREHENSIVE METABOLIC 2022 23:14:00 [facility]



                PANEL                                           

 

                LIPASE LEVEL    2022 23:14:00 [facility]

 

                ESTIMATED GFR   2022 23:14:00 [facility]

 

                BASIC METABOLIC PANEL 2022 22:54:00 Galo [facility]        

 

                ESTIMATED GFR   2022 22:54:00 [facility]        

 

                POC GLUCOSE     2022 22:04:00 Trev Neves [facility]

 

                CT ANGIOGRAM PE CHEST 2022 00:23:56 Emilia Bonilla [facility]

 

                IR VERTEBRO LUM UNI OR IVON 2022 19:25:00 Christine Saini

[facility]

 

                ECG 12-LEAD     2022 18:05:39 Mariluz Noe [facility]

 

                BASIC METABOLIC PANEL 2022 10:16:00 Edinson Trev [facility]

 

                HC COMPLETE BLD COUNT 2022 10:16:00 Edinson [facility]



                W/AUTO DIFF                                     

 

                PROTHROMBIN TIME WITH INR 2022 10:16:00 Trev Neves

[facility]

 

                ESTIMATED GFR   2022 10:16:00 Trev Neves Saúl [facility]

 

                TYPE AND SCREEN 2022 10:16:00 Edinson Trev Saúl [facility]

 

                TROPONIN T      2022 02:33:00 Trev Neves [facility]

 

                HEMODIALYSIS    2022 01:59:31 [facility]

[facility]



                                                Gabriela        

 

                TTE COMPLETE, WO CONTRAST, 2022 00:15:00 Trev Neves

[facility]



                W DOPPLER ([id number])                                 

 

                TROPONIN T      2022 23:10:00 Trev Neves [facility]

 

                TROPONIN T      2022 20:19:00 Edinson Trev [facility]

 

                HEMODIALYSIS    2022 13:51:20 [facility]



                                                Gabriela        

 

                HC COMPLETE BLD COUNT 2022 11:10:00 Parveen [facility]



                W/AUTO DIFF                                     

 

                BASIC METABOLIC PANEL 2022 11:10:00 Parveen [facility]

 

                PARATHYROID HORMONE 2022 11:10:00 Parveen [facility]

 

                VITAMIN D 25 HYDROXY LEVEL 2022 11:10:00 Parveen [facility]

 

                DIGOXIN LEVEL   2022 11:10:00 [facility]        

 

                PHOSPHORUS LEVEL 2022 11:10:00 [facility]



                                                Gabriela        

 

                ESTIMATED GFR   2022 11:10:00 Parveen [facility]

 

                HC COMPLETE BLD COUNT 2022 09:45:00 Parveen [facility]



                W/AUTO DIFF                                     

 

                BASIC METABOLIC PANEL 2022 09:45:00 [facility]

 

                PROTHROMBIN TIME WITH INR 2022 09:45:00 [facility]

 

                ESTIM[facility] G[facility]   2022 [facility]45[facility]

 

                GASTROINTESTINAL PANEL 2022 22:47:00 [facility]endez  [facility]

[facility]

 

                XR CHEST 1 VW PORTABLE 2022 17:34:26 [facility]

 

                HC COMPLETE BLD COUNT 2022 10:01:00 Parveen, Christine Víctor 

[facility]



                W/AUTO DIFF                                     

 

                BASIC METABOLIC PANEL 2022 10:01:00 Parveen Christine [facility]

 

                ESTIMATED GFR   2022 10:01:00 ParveenChristine kirkland [facility]

 

                HEMODIALYSIS    2022 14:21:31 [place]



                                                Gabriela        

 

                HC COMPLETE BLD COUNT 2022 10:11:00 ParveenChristine kirkland [facility]



                W/AUTO DIFF                                     

 

                COMPREHENSIVE METABOLIC 2022 10:11:00 ParveenChristine Mesha

[facility]



                PANEL                                           

 

                ESTIMATED GFR   2022 10:11:00 Parveen Christine [facility]

 

                MRI LUMBAR SPINE WO 2022 02:13:34 Rosalio Angulo    [facility]



                CONTRAST                                        

 

                POTASSIUM LEVEL 2022 18:15:00 [facility]



                                                Gabriela        

 

                HEPATITIS B SURFACE 2022 13:46:00 [place], [facility]



                ANTIGEN                         Gabriela        

 

                HEPATITIS B SURFACE 2022 13:46:00 [place], [facility]



                ANTIBODY                        Gabriela        

 

                TROPONIN T      2022 13:43:00 Rosalio Angulo Ho

spital

 

                HEMODIALYSIS    2022 13:16:08 [facility]



                                                Gabriela        

 

                HC COMPLETE BLD COUNT 2022 11:21:00 Rosalio Angulo    [facility]



                W/AUTO DIFF                                     

 

                PROTHROMBIN TIME WITH INR 2022 11:21:00 Rosalio Angulo    [facility]

 

                COMPREHENSIVE METABOLIC 2022 11:21:00 Rosalio Angulo

[facility]



                PANEL                                           

 

                THYROID STIMULATING 2022 11:21:00 Rosalio Angulo    [facility]



                HORMONE                                         

 

                ESTIMATED GFR   2022 11:21:00 Rosalio Angulo 

spital

 

                ZZCOVID-19 ANTI-SPIKE IGG 2022 06:43:00 Rosalio Angulo    [facility]



                ANTIBODY TITER                                  

 

                COVID-19 SEROLOGY PATIENT 2022 06:43:00 [facility]



                SURVEILLANCE                                    

 

                TROPONIN T      2022 06:43:00 Rosalio Angulo 

spital

 

                PROCALCITONIN   2022 06:43:00 Rosalio Angluo 

spital

 

                URINE CULTURE   2022 05:18:00 Nadia Mendez 

spital

 

                COVID-19 QUALITATIVE 2022 04:41:00 Vanessa [facility]



                RT-PCR                                          

 

                URINALYSIS [facility]CREEN AND 2022 [facility]



                MICROSCOPY, WITH REFLEX TO                                 



                CULTURE                                         

 

                CT ABDOMEN [facility]ELVIS WO [facility]22 [facility]



                [facility]                                        

 

                CT LUMBAR SPINE WO 2022 03[facility]49:20 [facility]



                [facility]                                        

 

                COMPREHENSIVE METABOLIC 2022 03:31:00 Vanessa [facility]



                PANEL                                           

 

                HC COMPLETE BLD COUNT 2022 03:31:00 Vanessa [facility]



                W/AUTO DIFF                                     

 

                ESTIMATED GFR   2022 03:31:00 Nadia Mendez 

spital

 

                XR CHEST 1 VW   2022 03:22:49 [facility]

[facility]

 

                ECG ED PRELIMINARY 2022 02:44:33 [facility]



                INTERPRETATION                                  

 

                ECG 12-LEAD     2022 02:40:03 Rosalio Angulo 

spital

 

                HEMODIALYSIS    2022 14:28:03 [facility]



                                                Gabriela        

 

                BASIC METABOLIC PANEL 2022 03:17:00 [facility]



                                                Gabriela        

 

                ESTIMATED GFR   2022 03:17:00 Barankushwssandee-Rosy[facility]



                                                Gabriela        

 

                POC GLUCOSE     2022 02:29:00 Melissa Pickett [facility]



                                                R               

 

                IR VERTEBRO CERVICAL AND 2022 20:19:56 Melissa Pickett [facility]



                THOR UNI OR IVON                 R               

 

                IR VERTEBROPLASTY EA ADDL 2022 20:19:56 Nieves [facility]



                T OR L                          R               

 

                HI AN ELECTIVE  2022 19:47:00 Juan Jose [address] [facility]



                ENDOTRACHEAL AIRWAY                                 

 

                HEMODIALYSIS    2022 20:14:38 [facility], [facility]



                                                Gabriela        

 

                BLOOD CULTURE, AEROBIC & 2022 20:28:00 Magy Earl [facility]



                ANAEROBIC                                       

 

                URINE CULTURE   2022 23:56:00 [facility]



                                                Gabriela        

 

                CT RENAL STONE PROTOCOL 2022 23:39:33 Baramado-Daca, [facility]



                                                Gabriela        

 

                URINALYSIS SCREEN AND 2022 22:29:00 [facility]



                MICROSCOPY, WITH REFLEX TO                 Gabriela        



                CULTURE                                         

 

                HEMODIALYSIS    2022 15:08:13 [facility]anow[facility]andee[facility], [facility]



                                                Gabriela        

 

                HC COMPLETE BLD COUNT 2022 09:55:00 Génesis [facility]



                W/AUTO DIFF                     R               

 

                BASIC METABOLIC PANEL 2022 09:55:00 [facility]



                                                R               

 

                PROTHROMBIN TIME WITH INR 2022 09:55:00 Génesis [facility]



                                                R               

 

                URIC ACID LEVEL 2022 09:55:00 [facility]



                                                Gabriela        

 

                DIGOXIN LEVEL   2022 09:55:00 [facility]



                                                Gabriela        

 

                CORTISOL LEVEL, AM 2022 09:55:00 [place], [facility]



                                                Gabriela        

 

                HEMOGLOBIN A1C  2022 09:55:00 [facility]



                                                Gabriela        

 

                AMYLASE LEVEL   2022 09:55:00 [facility]



                                                Gabriela        

 

                LIPASE LEVEL    2022 09:55:00 [facility]



                                                Gabriela        

 

                ESTIMATED GFR   2022 09:55:00 Melissa Pickett [facility]



                                                R               

 

                VITAMIN D 25 HYDROXY LEVEL 2022 09:33:00 Beth-Rosy, 

[facility]        

 

                PARATHYROID HORMONE 2022 09:33:00 Baranowska-Samsona, [facility]        

 

                PHOSPHORUS LEVEL 2022 09:33:00 Baranowssandee-Daca, [facility]        

 

                XR SHOULDER 2+ VW RIGHT 2022 04:05:25 Baranolew-Daca, [facility]        

 

                HEMODIALYSIS    2022 03:41:52 Beth-Rosy, [facility]



                                                Gabriela        

 

                MRI LUMBAR SPINE WO 2022 15:10:25 [facility]



                CONTRAST                                        

 

                MRI THORACIC SPINE WO 2022 14:44:08 Moncho [facility]



                CONTRAST                                        

 

                BASIC METABOLIC PANEL 2022 10:43:00 [facility]        

 

                ESTIMATED GFR   2022 10:43:00 [facility], [facility]



                                                Gabriela        

 

                BASIC METABOLIC PANEL 2022 22:26:00 [facility]        

 

                ESTIMATED GFR   2022 22:26:00 [facility]



                                                Gabriela        

 

                HEMODIALYSIS    2022 14:03:25 [facility], [facility]



                                                Gabriela        

 

                HC COMPLETE BLD COUNT 2022 09:43:00 Lena Prater [facility]



                W/AUTO DIFF                     Tajdin          

 

                COMPREHENSIVE METABOLIC 2022 09:43:00 Lena Prater [facility]



                PANEL                           Tajdin          

 

                MAGNESIUM LEVEL 2022 09:43:00 [facility]



                                                Tajdin          

 

                ESTIMATED GFR   2022 09:43:00 [facility]



                                                Tajdin          

 

                TROPONIN T      2022 00:24:00 Mahamed [facility]

 

                HEPATITIS B SURFACE 2022 00:24:00 Galo [facility]



                ANTIGEN                         Gabriela        

 

                HEPATITIS B SURFACE AB, 2022 00:24:00 Galo [facility]



                QUANTITATIVE                    Gabriela        

 

                HEMODIALYSIS    2022 22:22:37 Janeen Rosenthal 

ospital



                                                [facility]        

 

                THYROID STIMULATING 2022 21:32:00 Esther Biggs [facility]



                HORMONE                                         

 

                T4, FREE        2022 21:32:00 Esther Biggs [facility]

spital

 

                TROPONIN T      2022 21:32:00 [facility]

[facility]

 

                COVID-19 QUALITATIVE 2022 21:22:00 Mahamed [facility]



                RT-PCR                                          

 

                CT LUMBAR SPINE WO 2022 20:55:41 Mahamed [facility]



                CONTRAST                                        

 

                CT THORACIC SPINE WO 2022 20:53:28 Mahamed [facility]



                CONTRAST                                        

 

                CT ANGIOGRAM PE CHEST 2022 20:45:21 Mahamed [facility]

 

                ECG 12-LEAD     2022 19:35:11 Mahamed [facility]

 

                XR THORACIC SPINE 2 VW 2022 19:17:36 Mahamed [facility]

 

                HC COMPLETE BLD COUNT 2022 18:50:00 Mahamed [facility]



                W/AUTO DIFF                                     

 

                COMPREHENSIVE METABOLIC 2022 18:50:00 Mahamed [facility]



                PANEL                                           

 

                TROPONIN T      2022 18:50:00 Mahamed [facility]

 

                ESTIMATED GFR   2022 18:50:00 Mahamed [facility]

 

                LIPASE LEVEL    2022 18:50:00 Ray, [facility]

 

                ECG ED PRELIMINARY 2022 18:18:35 Ray, [facility]



                INTERPRETATION                                  

 

                XR CHEST 2 VW   2022 16:02:54 Brenna Jacobs 

ospital

 

                Diabetic retinal eye 2020 06:00:00                 [facility]

[facility] [place]



                exam<sup>1</sup>                                 

 

                Mammogram       2017-07-15 05:00:00                 [facility] Her hernandes

 

                Colonoscopy<sup>2</sup> 2017 06:00:00                 Hakeem

rial [place]

 

                OPEN REDUCTION INTERNAL 2016-03-10 22:13:00 Yoan Lei   [facility]em

rial [place]



                FIXATION RADIUS                                 



                INTRA-ARTICULAR W/3                                 



                FRAGMENTS [id number]                                 



                (Right)<sup>1</sup>                                 

 

                Repair of       2016-03-10 06:00:00                 Doreen hernandes



                wrist<sup>3</sup>                                 

 

                skin cancer removed from 2012 00:00:00                 [facility]

[facility] [place]



                arm                                             

 

                [facility]                                 [facility]

 

                Procedure<sup>2</sup>                                 [facility]

ermann

 

                Tubal ligation                                  [facility]

 

                Eye operation                                   [facility] [place]

 

                Biopsy of breast                                 [facility] Donny

n

 

                bilateral radial                                 [facility]

n



                kerototomy                                      

 

                bilateral tubal ligation                                 [facility]

l [place]

 

                colonoscopy                                     [facility]

 

                Skin cancer of                                  [facility]



                arm<sup>4</sup>                                 







Plan of Care







             Planned Activity Planned Date Details      Comments     Source

 

             Future Scheduled 2029   DTAP/TDAP/TD VACCINES              [facility]

[facility]



             Test         00:00:00     (2 - Td or Tdap) [code              Medic

al Center



                                       = DTAP/TDAP/TD              



                                       VACCINES (2 - Td or              



                                       Tdap)]                    

 

             Future Scheduled 2029   DTAP/TDAP/TD VACCINES              [facility]

[facility]



             Test         00:00:00     (2 - Td or Tdap) [code              Medic

al Center



                                       = DTAP/TDAP/TD              



                                       VACCINES (2 - Td or              



                                       Tdap)]                    

 

             Future Scheduled 2029   DTAP/TDAP/TD VACCINES              [facility]

[facility]



             Test         00:00:00     (2 - Td or Tdap) [code              Medic

al Center



                                       = DTAP/TDAP/TD              



                                       VACCINES (2 - Td or              



                                       Tdap)]                    

 

             Future Scheduled 2022   HEPATITIS B VACCINES              Met

[facility]



             Test         11:07:43     (1 of 3 - 3-dose              



                                       series) [code =              



                                       HEPATITIS B VACCINES              



                                       (1 of 3 - 3-dose              



                                       series)]                  

 

             Future Scheduled 2022   COVID-19 VACCINE (#1)              [facility]



             Test         11:07:43     [code = COVID-19              



                                       VACCINE (#1)]              

 

             Future Scheduled 2022   65+ PNEUMOCOCCAL              [facility]



             Test         11:07:43     VACCINE (1 - PCV)              



                                       [code = 65+               



                                       PNEUMOCOCCAL VACCINE              



                                       (1 - PCV)]                

 

             Future Scheduled 2022   SHINGLES VACCINES (1              Met

[facility]



             Test         11:07:43     of 2) [code = SHINGLES              



                                       VACCINES (1 of 2)]              

 

             Future Scheduled 2022   Screening for              [facility]



             Test         11:07:43     malignant neoplasm of              



                                       cervix (procedure)              



                                       [code = [id number]]              

 

             Future Scheduled 2022   COLONOSCOPY SCREENING              [facility]



             Test         11:07:43     [code = COLONOSCOPY              



                                       SCREENING]                

 

             Future Scheduled 2022   BREAST CANCER              [facility]



             Test         11:07:43     SCREENING [code =              



                                       BREAST CANCER              



                                       SCREENING]                

 

             Future Scheduled 2022   MEDICARE ANNUAL              [facility]



             Test         00:00:00     WELLNESS (YEAR 2 or              Medical 

Center



                                       FIRST YEAR if no IPPE)              



                                       [code = MEDICARE              



                                       ANNUAL WELLNESS (YEAR              



                                       2 or FIRST YEAR if no              



                                       IPPE)]                    

 

             Future Scheduled 2022   MEDICARE ANNUAL              [facility]

[facility]



             Test         00:00:00     WELLNESS (YEAR 2 or              Medical 

Center



                                       FIRST YEAR if no IPPE)              



                                       [code = MEDICARE              



                                       ANNUAL WELLNESS (YEAR              



                                       2 or FIRST YEAR if no              



                                       IPPE)]                    

 

             Future Scheduled 2022   MEDICARE ANNUAL              [facility]



             Test         00:00:00     WELLNESS (YEAR 2 or              Medical 

Center



                                       FIRST YEAR if no IPPE)              



                                       [code = MEDICARE              



                                       ANNUAL WELLNESS (YEAR              



                                       2 or FIRST YEAR if no              



                                       IPPE)]                    

 

             Future Scheduled 2022   DEPRESSION SCREENING              [facility]



             Test         00:00:00     (12+) [code =              Medical Center



                                       DEPRESSION SCREENING              



                                       (12+)]                    

 

             Future Scheduled 2022   FALLS RISK SCREENING              [facility]



             Test         00:00:00     [code = FALLS RISK              Medical C

enter



                                       SCREENING]                

 

             Future Scheduled 2022   DEPRESSION SCREENING              [facility]



             Test         00:00:00     (12+) [code =              Medical Center



                                       DEPRESSION SCREENING              



                                       (12+)]                    

 

             Future Scheduled 2022   FALLS RISK SCREENING              [facility]



             Test         00:00:00     [code = FALLS RISK              Medical C

enter



                                       SCREENING]                

 

             Future Scheduled 2022   DEPRESSION SCREENING              [facility]



             Test         00:00:00     (12+) [code =              Medical Center



                                       DEPRESSION SCREENING              



                                       (12+)]                    

 

             Future Scheduled 2022   FALLS RISK SCREENING              [facility]



             Test         00:00:00     [code = FALLS RISK              Medical C

enter



                                       SCREENING]                

 

             Future Scheduled 2021   PNEUMOCOCCAL 65+ YRS              [facility]



             Test         00:00:00     (1 - PCV) [code =              Medical Ce

nter



                                       PNEUMOCOCCAL 65+ YRS              



                                       (1 - PCV)]                

 

             Future Scheduled 2021   PNEUMOCOCCAL 65+ YRS              [facility]



             Test         00:00:00     (1 - PCV) [code =              Medical Ce

nter



                                       PNEUMOCOCCAL 65+ YRS              



                                       (1 - PCV)]                

 

             Future Scheduled 2021   PNEUMOCOCCAL 65+ YRS              [facility]



             Test         00:00:00     (1 - PCV) [code =              Medical Ce

nter



                                       PNEUMOCOCCAL 65+ YRS              



                                       (1 - PCV)]                

 

             Future Scheduled 2006   SHINGLES VACCINES (1              [facility]



             Test         00:00:00     of 2) [code = SHINGLES              Medic

al Center



                                       VACCINES (1 of 2)]              

 

             Future Scheduled 2006   SHINGLES VACCINES (1              [facility]



             Test         00:00:00     of 2) [code = SHINGLES              Medic

al Center



                                       VACCINES (1 of 2)]              

 

             Future Scheduled 2006   SHINGLES VACCINES (1              [facility]



             Test         00:00:00     of 2) [code = SHINGLES              Medic

al Center



                                       VACCINES (1 of 2)]              

 

             Future Scheduled 1968   Tobacco Cessation              [facility]



             [facility]         00:00:00     Counseling and              Medical Cente

r



                                       Screening (12+) [code              



                                       = Tobacco Cessation              



                                       Counseling and              



                                       Screening (12+)]              

 

             Future Scheduled 1968   Tobacco Cessation              [facility]



             Test         00:00:00     Counseling and              Medical Cente

r



                                       Screening (12+) [code              



                                       = Tobacco Cessation              



                                       Counseling and              



                                       Screening (12+)]              

 

             Future Scheduled 1968   Tobacco Cessation              [facility]



             [facility]         00:00:00     Counseling and              Medical Cente

r



                                       Screening (12+) [code              



                                       = Tobacco Cessation              



                                       Counseling and              



                                       Screening (12+)]              

 

             Future Scheduled 1957   COVID-19 VACCINE (#1)              [facility]



             [facility]         00:00:00     [code = COVID-19              [facility]

ter



                                       VACCINE (#1)]              

 

             Future Scheduled 1957   COVID-19 VACCINE (#1)              [facility]



             Test         00:00:00     [code = COVID-19              [facility]

ter



                                       VACCINE (#1)]              

 

             Future Scheduled 1957   COVID-19 VACCINE (#1)              [facility]



             Test         00:00:00     [code = COVID-19              [facility]

ter



                                       VACCINE (#1)]              

 

             Future Scheduled 1956   Screening for              [facility]

[facility]



             Test         00:00:00     malignant neoplasm of              Medica

l Center



                                       breast (procedure)              



                                       [code = 433155399]              

 

             Future Scheduled 1956   CT Colonography              [facility]



             Test         00:00:00     (combo) [code = CT              Medical C

enter



                                       Colonography (combo)]              

 

             Future Scheduled 1956   Screening for              [facility]

es



             Test         00:00:00     malignant neoplasm of              Medica

l Center



                                       colon (procedure)              



                                       [code = 191281549]              

 

             Future Scheduled 1956   Screening for              [facility]

es



             Test         00:00:00     malignant neoplasm of              Medica

l Center



                                       colon (procedure)              



                                       [code = 865840864]              

 

             Future Scheduled 1956   DXA SCAN [code = DXA              [facility]



             Test         00:00:00     SCAN]                     [facility]

 

             Future Scheduled 1956   Screening for              [facility]

es



             Test         00:00:00     malignant neoplasm of              Medica

l Center



                                       colon (procedure)              



                                       [code = 451044944]              

 

             Future Scheduled 1956   Screening for              [facility]

[facility]



             Test         00:00:00     malignant neoplasm of              Medica

l Center



                                       colon (procedure)              



                                       [code = 766336702]              

 

             Future Scheduled 1956   Sigmoidoscopy [code =              [facility]



             Test         00:00:00     Sigmoidoscopy]              [facility]



 

             Future Scheduled 1956   Screening for              [facility]

es



             Test         00:00:00     malignant neoplasm of              Medica

l Center



                                       breast (procedure)              



                                       [code = 649900163]              

 

             Future Scheduled 1956   CT Colonography              [facility]



             Test         00:00:00     (combo) [code = CT              Medical C

enter



                                       Colonography (combo)]              

 

             Future Scheduled 1956   Screening for              [facility]

es



             Test         00:00:00     malignant neoplasm of              Medica

l Center



                                       colon (procedure)              



                                       [code = 761675815]              

 

             Future Scheduled 1956   Screening for              [facility]

es



             Test         00:00:00     malignant neoplasm of              Medica

l Center



                                       colon (procedure)              



                                       [code = 951055393]              

 

             Future Scheduled 1956   DXA SCAN [code = DXA              [facility]



             Test         00:00:00     SCAN]                     [facility]

 

             Future Scheduled 1956   Screening for              [facility]

es



             Test         00:00:00     malignant neoplasm of              Medica

l Center



                                       colon (procedure)              



                                       [code = 295024275]              

 

             Future Scheduled 1956   Screening for              [facility]

es



             Test         00:00:00     malignant neoplasm of              Medica

l Center



                                       colon (procedure)              



                                       [code = 802364996]              

 

             Future Scheduled 1956   Sigmoidoscopy [code =              [facility]



             Test         00:00:00     Sigmoidoscopy]              [facility]

 

             Future Scheduled 1956   Screening for              [facility]

es



             Test         00:00:00     malignant neoplasm of              Medica

l Center



                                       breast (procedure)              



                                       [code = 693681495]              

 

             Future Scheduled 1956   CT Colonography              [facility]



             Test         00:00:00     (combo) [code = CT              Medical C

enter



                                       Colonography (combo)]              

 

             Future Scheduled 1956   Screening for              [facility]

es



             Test         00:00:00     malignant neoplasm of              Medica

l Center



                                       colon (procedure)              



                                       [code = 221086264]              

 

             Future Scheduled 1956   Screening for              [facility]

es



             Test         00:00:00     malignant neoplasm of              Medica

l Center



                                       colon (procedure)              



                                       [code = 311292014]              

 

             Future Scheduled 1956   DXA SCAN [code = DXA              [facility]



             Test         00:00:00     SCAN]                     [facility]

 

             Future Scheduled 1956   Screening for              [facility]

es



             Test         00:00:00     malignant neoplasm of              Medica

l Center



                                       colon (procedure)              



                                       [code = 120409963]              

 

             Future Scheduled 1956   Screening for              [facility]

es



             Test         00:00:00     malignant neoplasm of              Medica

l Center



                                       colon (procedure)              



                                       [code = 946567860]              

 

             Future Scheduled 1956   Sigmoidoscopy [code =              [facility]



             Test         00:00:00     Sigmoidoscopy]              [facility]

r







Encounters







        Start   End     Encounter Admission Attending Care    Care    Encounter 

Source



        Date/Time Date/Time Type    Type    Clinicians Facility Department ID   

   

 

        2022         Inpatient BOLIVAR German    [id number]

8 [facility]



        09:00:00                         [address]

 

        [facility]

[id number] [facility]



        14:20:00 12:50:00                 VINAYAK                             

 

        2022 [facility]   [id number]

 [id number] [facility]



        14:20:00 12:50:00 Encounter         Sanket Marshall



                                        Aarti Vinayak                         [facility]

 

        2022 [facility]   [id number]

 [id number] [facility]



        14:20:00 12:50:00 Encounter         Sanket Marshall Ernst                

         cece



                                        [facility]

 

        2022 Orders                  [facility]   [id number]

[address] [facility]



        00:00:00 00:00:00 [facility]

 

        2022 Orders                  [facility]   [id number]

[address] [facility]



        00:00:00 00:00:00 [facility]

 

        2022 Outpatient                 [facility]     [id number]

[id number] [facility]



        00:00:00 23:59:00                                                 Jessie

 

        2022 Orders                  [facility]   [id number]

[address] [facility]



        00:00:00 00:00:00 [facility]

 

        2022 Orders                  [facility]   [id number]

[address] [facility]



        00:00:00 00:00:00 [facility]

 

        2022 [address]2.840.1 [id number]

[address] [place]



        00:00:00 00:00:00 Encounter         SARBJIT 70288.1.1         208     Me

thodi



                                                3.430.2.7                 st



                                                .3.213743                 



                                                .8                      

 

        2022 Travel                  1.2.840.1 1.2.765.882 8933

290698 Methodi



        00:00:00 00:00:00                         89115.1.1 350.1.13.43 679     

st



                                                3.430.2.7 0.2.7.3.698         Ho

spita



                                                .3.480266 084.8           l



                                                .8                      

 

        2022 [facility],  1.2.840.1 [id number]

6850 Methodi



        00:00:00 00:00:00 Only            Adriel 56955.1.1         [address]     st



                                        Baljinder 3.430.2.7                 Hosp

mimi



                                                .3.654505                 l



                                                .8                      

 

        2022-10-21 2022-10-21 [facility],  1.2.840.1 [id number]

5385 Methodi



        12:00:00 12:15:00 Visit           Adriel 43808.1.1         [address]     st



                                        Baljinder 3.430.2.7                 Hosp

mimi



                                                .3.239457                 l



                                                .8                      

 

        2022-10-21 2022-10-21 Travel                  1.2.840.1 1.2.168.123 8720

[id number] Methodi



        00:00:00 00:00:00                         36087.1.1 350.1.13.43 055     

st



                                                3.430.2.7 0.2.7.3.698         Ho

spita



                                                .3.229334 084.8           l



                                                .8                      

 

        2022-10-12 2022-10-12 Travel                  1.2.840.1 1.2.928.119 6324

551353 Methodi



        00:00:00 00:00:00                         21902.1.1 350.1.13.43 459     

st



                                                3.430.2.7 0.2.7.3.698         Ho

spita



                                                .3.077964 084.8           l



                                                .8                      

 

        2022-10-05 2022-10-05 \[facility]\"",  1.2.840.1 [id number]

[address] [place]



        00:00:00 00:00:00 Encounter         ADRIEL 34600.1.1         622     M

ethodi



                                                3.430.2.7                 st



                                                .3.029893                 



                                                .8                      

 

        2022-10-05 2022-10-05 Hospital         [facility],  1.2.840.1 [id number] 

[address] [place]



        00:00:00 00:00:00 Encounter         ADRIEL 15246.1.1         624     M

ethodi



                                                3.430.2.7                 st



                                                .3.879185                 



                                                .8                      

 

        2022-10-05 2022-10-05 \[facility]\"",  1.2.840.1 [id number] 

[address] [place]



        00:00:00 00:00:00 Encounter         ADRIEL 27810.1.1         626     M

ethodi



                                                3.430.2.7                 st



                                                .3.947691                 



                                                .8                      

 

        2022-10-05 2022-10-05 Travel                  1.2.840.1 1.2.988.796 4716

624507 Methodi



        00:00:00 00:00:00                         61733.1.1 350.1.13.43 702     

st



                                                3.430.2.7 0.2.7.3.698         Ho

spita



                                                .3.586836 084.8           l



                                                .8                      

 

        2022 Office          [facility]ae[facility]ichael[facility], 1.2.840.1 [id number]

9152 Methodi



        14:15:00 14:15:00 Visit           Daylin  45386.1.1         [address]     st



                                                3.430.2.7                 Hospit

a



                                                .3.320514                 l



                                                .8                      

 

        2022 Travel                  1.2.840.1 1.2.482.251 3131

018830 Methodi



        00:00:00 00:00:00                         18282.1.1 350.1.13.43 876     

st



                                                3.430.2.7 0.2.7.3.698         Ho

spita



                                                .3.354468 084.8           l



                                                .8                      

 

        2022 Office          Bridger,  1.2.840.1 [id number]

3139 Methodi



        12:45:00 13:22:26 Visit           Adriel 19355.1.1         547     st



                                        Baljinder 3.430.2.7                 Hosp

mimi



                                                .3.078077                 l



                                                .8                      

 

        2022 Travel                  1.2.840.1 1.2.728.638 3305

948686 Methodi



        00:00:00 00:00:00                         18208.1.1 350.1.13.43 541     

st



                                                3.430.2.7 0.2.7.3.698         Ho

spita



                                                .3.023322 084.8           l



                                                .8                      

 

          2022 [facility]            [facility] 1.2.840.1 

[id number]                 [id number]                Methodi



        07:21:00 20:54:00 Encounter         Martina Douglass 75802.1.1         90

4     st



                                                3.430.2.7                 Hospit

a



                                                .3.303367                 l



                                                .8                      

 

        2022 Prep for         Lei, 1.2.840.1 [id number]

76085 Methodi



        00:00:00 00:00:00 Surgery         Louann MEDEL 75660.1.1         190     s

t



                                                3.430.2.7                 Hospit

a



                                                .3.333830                 l



                                                .8                      

 

        2022 Travel                  1.2.840.1 1.2.286.645 9958

481871 Methodi



        00:00:00 00:00:00                         45494.1.1 350.1.13.43 602     

st



                                                3.430.2.7 0.2.7.3.698         Ho

spita



                                                .3.044690 084.8           l



                                                .8                      

 

        2022 Outpatient CLEMENTE Minaya    [id number]

562 [facility]



        04:58:00 04:58:00                 Venancio                  89      [facility]

 

        2022 Outpatient CLEMENTE Minaya    [id number]

090 [facility]



        06:37:00 06:37:00                 Venancio                  64      [facility]

 

        2022 [facility]   [id number]

[address] [facility]



        00:00:00 00:00:00 [facility]

 

        2022 Lab                     [facility]   [id number]

[address] [facility]



        00:00:00 00:00:00 Requisitio                                         [facility]

[facility]

 

        2022 Outpatient         [facility] U     [facility]     476

[id number] [place]



        00:00:00 00:00:00                                         [id number]   [place]



                                                                        Urology

 

        2022 Emergency         Louann Jaime 1.2.840

.1 [id number] 

[id number]                              Methodi



        02:04:00 18:01:00                 Diab Aki 14773.1.1         026     

st



                                        [facility] 3.430[facility]2.7          

       [facility]



                                        Squires, Fantasma .3.668622                 

l



                                                .8                      

 

        2022 Travel                  1.2.840.1 1.2.013.827 9701

475422 Methodi



        00:00:00 00:00:00                         83534.1.1 350.1.13.43 552     

st



                                                3.430.2.7 0.2.7.3.698         Ho

spita



                                                .3.689545 084.8           l



                                                .8                      

 

        2022 Outpatient                 [facility]

[id number] [facility]



        21:20:00 04:59:59                         r       Medicine 56      l



                                                        Rudy Hines

n

 

        2022 Outpatient                 nullFlavo [facility]

[id number] [facility]



        21:20:00 04:59:59                         r       Medicine 56      l



                                                        Rudy Hines

n

 

        2022 Outpatient         Coyne,  [facility]    [id number]

[id number] 



        16:20:00 23:59:59                 Charisse N                 56      

 

        2022 Outpatient                 [facility]    [id number]

[id number] [facility]



        16:20:00 16:20:00                                         56      l



                                                                        Barron

 

        2022 Emergency         Maureen,  1.2.840.1 [id number]

[id number] Methodi



        17:10:00 22:30:00                 Martha   35841.1.1         [address]     st



                                        [place] 3.430.2.7                 Hosp

mimi



                                                .3.981211                 l



                                                .8                      

 

        2022 Travel                  1.2.840.1 1.2.750.308 7806

706224 Methodi



        00:00:00 00:00:00                         99833.1.1 350.1.13.43 329     

st



                                                3.430.2.7 0.2.7.3.698         Ho

spita



                                                .3.689647 084.8           l



                                                .8                      

 

        2022 Hospital         Parveen Hutchinson . 1.2.840.1 104

[id number] 

[id number]                              Methodi



        21:20:00 13:07:00 Encounter         Shaikh Rosalio 38070.1.1         968  

   st



                                        Christine Sainiyan 3.430.2.7         

        Hospita



                                        Edinson Trev Saúl .3.041613          

       l



                                                .8                      

 

        2022 Anesthesia         Hasmukh,   1.2.840.1 [id number]

[id number] Methodi



        13:41:00 14:30:00 Event           Mariluz  47593.1.1         538     st



                                        Laura  3.430.2.7                 Hospit

a



                                                .3.594046                 l



                                                .8                      

 

        2022 Travel                  1.2.840.1 1.2.837.805 9594

422706 Methodi



        00:00:00 00:00:00                         13590.1.1 350.1.13.43 714     

st



                                                3.430.2.7 0.2.7.3.698         Ho

spita



                                                .3.686187 084.8           l



                                                .8                      

 

        2022 Outpatient Suzie Ramos HCAPM   DAYS    

[id number] [facility]



        07:07:00 07:07:00                                         79      [facility]

 

        2022 Outpatient Suzie Ramos HCAPM   HCAPM   F94

[id number] [facility]



        07:07:00 07:07:00                                            [facility]

 

        2022 [facility] [id number] [facility]



        15:25:04 04:59:59 Message                 r       Medicine 17      l



                                                        Rudy martinez

 

        2022 Phone                   [facility] 3467

[id number] Memoria



        15:25:04 04:59:59 Message                 r       Medicine 17      l



                                                        Rudy Hines

n

 

        2022 Outpatient                 [facility]    MG    [id number]

355 



        10:25:04 23:59:59                                         17      

 

        2022 Emergency EM      Hadley Medina HCAPM   CT    LA00

[id number] [facility]



        12:08:00 15:51:00                                         00      [facility]

 

        2022 Emergency EM      Hadley Medina HCAPM   HCAPM   F945

 [facility]



        12:08:00 15:51:00                                            [facility]

 

        2022 Outpatient RUFINA Cuellar, HCAPM   ENDO    

[id number] [facility]



        07:10:00 07:10:00                 Clark                   92      [facility]

 

        2022 Ambulatory                 [facility] 3

[id number] Memoria



        15:15:00 15:15:00 Pre-Reg                 r       Medicine 54      l



                                                        Rudy martinez

 

        2022 Ambulatory                 [facility] 3

[id number] Memoria



        15:15:00 15:15:00 Pre-Reg                 r       Medicine 54      l



                                                        Rudy Hines

n

 

        2022 Outpatient                 [facility]    [facility]    [id number]

365 Memoria



        10:15:00 10:15:00                                         54      dennis Rosarioann

 

        2022 Outpatient         Coyne,  MG    MG    [id number]

365 



        10:15:00 10:15:00                 Charisse MARTINEZ                 54      

 

        2022 Outpatient                 [facility]    [facility]    [id number]

365 Memoria



        10:30:00 10:30:00                                         55      dennis Mart

 

        2022 Outpatient                 [facility]    [facility]    [id number]

365 [facility]



        10:30:00 10:30:00                                         55      dennis Mart

 

        2022 Patient         Antionette Joana 1.2.840.1 [id number]

[id number] 

Methodi



        00:00:00 00:00:00 Outreach                 19668.1.1         020     st



                                                3.430.2.7                 Hospit

a



                                                .3.818528                 l



                                                .8                      

 

        2022 Office          Ekergt, 1.2.840.1 [id number]

0540 Methodi



        10:15:00 11:21:59 Visit           Daylin  83476.1.1         779     st



                                                3.430.2.7                 Hospit

a



                                                .3.743698                 l



                                                .8                      

 

        2022 Travel                  1.2.840.1 1.2.456.862 3452

187931 Methodi



        00:00:00 00:00:00                         77301.1.1 350.1.13.43 779     

st



                                                3.430.2.7 0.2.7.3.698         Ho

spita



                                                .3.528592 084.8           l



                                                .8                      

 

        2022 [facility]         Suresh Pineda 1.2.840.1 1

[id number] 

[id number]                              Methodi



        12:16:00 16:05:00 Encounter         GénesisMelissa chavis Ian ANKIT 67956.1.1   

      492     st



                                                3.430.2.7                 Hospit

a



                                                .3.317400                 l



                                                .8                      

 

        2022 Anesthesia         Naseem Willis 1.2.8

40.1 [id number] 

[id number]                              Methodi



        13:07:00 14:43:00 Event           Elaina Clark 32231.1.1         65

0     st



                                                3.430.2.7                 Hospit

a



                                                .3.999327                 l



                                                .8                      

 

        2022 Telephone         Ekbomo, 1.2.840.1 [id number] 2100

404039 Methodi



        00:00:00 00:00:00                 Daylin  86379.1.1         006     st



                                                3.430.2.7                 Hospit

a



                                                .3.241910                 l



                                                .8                      

 

        2022 Phone                   [facility] Dwucaz 8953 [id number] Memmaribel



        17:07:02 05:59:59 Message                 r       Medicine 16      dennis Hines

n

 

        2022 Phone                   [facility] 346Mikhail

[id number] Memoria



        17:07:02 05:59:59 Message                 r       Medicine 16      dennis Hines

n

 

        2022 Outpatient                 [facility]    [id number]

355 



        11:07:02 23:59:59                                         16      

 

        2022 Travel                  1.2.840.1 1.2.762.214 9103

490769 Methodi



        00:00:00 00:00:00                         17940.1.1 350.1.13.43 996     

st



                                                3.430.2.7 0.2.7.3.698         Ho

spita



                                                .3.232363 084.8           l



                                                .8                      

 

        2022 Office          [facility],  1.2.840.1 [id number]

7744 Methodi



        16:30:00 16:34:07 Visit           Brenna 36391.1.1         169     st



                                                3.430.2.7                 Hospit

a



                                                .3.364643                 l



                                                .8                      

 

        2022 [facility],  1.2.840.1 [id number] 67045

28830 Methodi



        09:45:00 23:59:00 Encounter         Brenna 75789.1.1         979     

st



                                                3.430.2.7                 Hospit

a



                                                .3.520498                 l



                                                .8                      

 

        2022 Travel                  1.2.840.1 1.2.377.792 3900

501411 Methodi



        00:00:00 00:00:00                         49092.1.1 350.1.13.43 961     

st



                                                3.430.2.7 0.2.7.3.698         Ho

spita



                                                .3.306990 084.8           l



                                                .8                      

 

        2022 Phone                   [facility] 3467

[id number] Memoria



        19:33:53 05:59:59 Message                 r       Medicine 15      dennis martinez

 

        2022 Phone                   [facility] Mayra7

[id number] Memoria



        19:33:53 05:59:59 Message                 r       Medicine 15      dennis Hines

n

 

        2022 Outpatient                 [facility]    [facility]    [id number]

355 



        13:33:53 23:59:59                                         15      

 

        2021 Phone                   [facility] 3467

[id number] Memoria



        19:38:03 05:59:59 Message                 r       Medicine 14      dennis Hines

n

 

        2021 Phone                   [facility] 3467

[id number] Memoria



        19:38:03 05:59:59 Message                 r       Medicine 14      dennis Hines

n

 

        2021 Outpatient                 [facility]    [facility]    [id number]

355 



        13:38:03 23:59:59                                         14      

 

        2021 Outpatient                 [facility] 3

[id number] Memoria



        21:15:00 05:59:59                         r       Medicine 53      l



                                                        Rudy Hines

n

 

        2021 Outpatient                 [facility] 3

[id number] Memoria



        21:15:00 05:59:59                         r       Medicine 53      dennis Hines

n

 

        2021 Outpatient         Coyne,  [facility]    [facility]    [id number]

365 



        15:15:00 23:59:59                 Charisse MARTINEZ                 53      

 

        2021 Outpatient                 [facility]    [facility]    [id number]

365 Memoria



        15:15:00 15:15:00                                         53      dennis Mart

 

        2021 Office          Ahmed,  1.2.840.1 [id number]

2742 Methodi



        15:30:00 16:01:04 Visit           Brenna 95829.1.1         834     st



                                                3.430.2.7                 Hospit

a



                                                .3.861911                 l



                                                .8                      

 

        2021 Travel                  1.2.840.1 1.2.097.365 5276

970925 Methodi



        00:00:00 00:00:00                         28443.1.1 350.1.13.43 575     

st



                                                3.430.2.7 0.2.7.3.698         Ho

spita



                                                .3.629214 084.8           l



                                                .8                      

 

        2021 Between                 [facility] 3467

[id number] Memoria



        14:18:24 14:18:24 Visit                   r       Medicine 62      l



                                                        Grant         Donny martinez

 

        2021 Between                 [facility] 3467

[id number] Memoria



        14:18:24 14:18:24 Visit                   r       Medicine 62      l



                                                        Rudy martinez

 

        2021 Outpatient                 [facility]    [facility]    [id number]

375 



        08:18:24 08:18:24                                         62      

 

        2021 Between                 [facility] 3467

[id number] Memoria



        00:56:47 00:56:47 Visit                   r       Medicine 61      l



                                                        Grant         Donny martinez

 

        2021 Between                 [facility] 3467

[id number] Memoria



        00:56:47 00:56:47 Visit                   r       Medicine 61      l



                                                        Rudy martinez

 

        2021 Outpatient                 [facility]    [facility]    [id number]

375 



        18:56:47 18:56:47                                         61      

 

        2021 Outpatient                 [facility] 3

[id number] Memoria



        20:00:00 05:59:59                         r       Medicine 52      l



                                                        Rudy martinez

 

        2021 Outpatient                 [facility] 3

[id number] Memoria



        20:00:00 05:59:59                         r       Medicine 52      dennis martinez

 

        2021 Outpatient         Coyne,  MG    [facility]    [id number]

365 



        14:00:00 23:59:59                 Charisse MARTINEZ                 52      

 

        [facility]    [id number]

365 Memoria



        14:00:00 14:00:00                                         52      dennis Mart

 

        2021 Phone                   [facility] 3467

[id number] Memoria



        21:38:38 05:59:59 Message                 r       Medicine 13      dennis martinez

 

        2021 Phone                   [facility] 3467

[id number] Memoria



        21:38:38 05:59:59 Message                 r       Medicine 13      l



                                                        Rudy Hines

n

 

        2021 Outpatient                 [facility]    [id number]

355 



        15:38:38 23:59:59                                         13      

 

        2021 Office          Vadim, 1.2.840.1 [id number]

2744 Methodi



        11:00:00 11:44:58 Visit           Daylin  94821.1.1         513     st



                                                3.430.2.7                 Hospit

a



                                                .3.974539                 l



                                                .8                      

 

        2021 Travel                  1.2.840.1 1.2.968.455 3279

550289 Methodi



        00:00:00 00:00:00                         55196.1.1 350.1.13.43 808     

st



                                                3.430.2.7 0.2.7.3.698         Ho

spita



                                                .3.935879 084.8           l



                                                .8                      

 

        2021-11-15 2021 Between                 [facility]Flavo [facility] 3467

[id number] Memoria



        15:33:59 15:33:59 Visit                   r       Medicine 59      l



                                                        Rudy Hines

michelle

 

        2021-11-15 2021 Between                 [facility]Flavo [facility] 3467

[id number] Memoria



        15:33:59 15:33:59 Visit                   r       Medicine 59      l



                                                        Rudy Hines

michelle

 

        2021-11-15 2021 Outpatient                 [facility]    [id number]

375 



        09:33:59 09:33:59                                         59      

 

        2021 2021-10-05 Inpatient         NIEVES, [facility]     074     [id number]

[address]405 [place]



        00:00:00 00:00:00                 MELISSA                    329     Method

i



                                                                        st

 

        2021 Outpatient         GC_EAH_Brow PRIV    PRIV    140

[id number] [facility]



        00:00:00 00:00:00                 n_J                     [id number] [facility]ica

l

 

        2021 Outpatient         LAKHWINDER [facility]     2100

339981 [place]



        00:00:00 00:00:00                 JULIANNA                 104     Method

i



                                                                        st

 

        2021 Outpatient         DANIELLA  [facility]     [id number]

765 [place]



        00:00:00 00:00:00                 RAZIUDDIN                 273     Meth

farhana



                                                                        

 

        2021 Outpatient         EKERUO, [facility]     [id number]

411 [place]



        00:00:00 00:00:00                 DAYLIN                  787     Method

i



                                                                        

 

        2021 Outpatient         DAOURA, [facility]     [id number]

587 [place]



        00:00:00 00:00:00                 MAGY                 546     Method

i



                                                                        

 

        2021 Inpatient         MATHIVANAN, [facility]     064     2100

257941 [place]



        00:00:00 00:00:00                 MELVIN                   275     Method

i



                                                                        

 

        2021 Outpatient         AHMED,  [facility]     [id number]

068 [place]



        00:00:00 00:00:00                 RAZIUDDIN                 199     Meth

farhana



                                                                        

 

        2021 Outpatient         EKERUO, [facility]     021     [id number]

337 [place]



        00:00:00 00:00:00                 DAYLIN                  640     Method

i



                                                                        

 

        2021 Outpatient         EKERUO, [facility]     [id number]

412 [place]



        00:00:00 00:00:00                 DAYLIN                  435     Method

i



                                                                        

 

        2021 Outpatient         EKERUO, [facility]     [id number]

412 [place]



        00:00:00 00:00:00                 DAYLIN                  537     Method

i



                                                                        

 

        2021 Outpatient         EKERUO, [facility]     [id number]

029 [place]



        00:00:00 00:00:00                 DAYLIN                  709     Method

i



                                                                        

 

        2021 Inpatient         SOLIPURAM, [facility]     064     [id number]

20921 [place]



        00:00:00 00:00:00                 MELISSA                    685     Method

i



                                                                        

 

        2021 Outpatient         AHMED,  [facility]     [id number]

046 [place]



        00:00:00 00:00:00                 RAZIUDDIN                 101     Meth

farhana



                                                                        

 

        2021 Outpatient                 nullFlavo [facility] 3467

[id number] Memoria



        01:00:00 04:59:00                         r       Barron 58      l



                                                        [place]         Meredith



 

        2021 Outpatient                 [facility]

[id number] Memoria



        01:00:00 04:59:00                         r       Barron 58      l



                                                        [place]         Meredith

nn

 

        2021 Outpatient         Ahmed,  [facility]    [facility]    [id number]

375 



        20:00:00 23:59:00                 Raziuddin                 58      

 

        2021 Outpatient         AHMED,  [facility]    PUL     7558   

 [facility]



        20:00:00 23:59:00                 RAZIUDDIN                         

 

        2021 Outpatient         AHMED,  [facility]     [id number]

900 [place]



        00:00:00 00:00:00                 RAZIUDDIN                 598     Meth

farhana



                                                                        st

 

        2021-04-15 2021 Inpatient         ANAIS, [facility]     064     2100

[id number] [place]



        00:00:00 00:00:00                 MELVIN                   060     Method

i



                                                                        st

 

        2021 Outpatient                 [facility]     [id number]

422 [place]



        00:00:00 00:00:00                                         470     Method

i



                                                                        st

 

        2021 Outpatient                 [facility]

[id number] Memoria



        19:30:00 04:59:59                         r       Medicine 51      l



                                                        Rudy Hines

n

 

        2021 Outpatient                 [facility] 3

[id number] Memoria



        19:30:00 04:59:59                         r       Medicine 51      l



                                                        Rudy Hines

n

 

        2021 Outpatient         Coyne,  MG    MG    [id number]

365 



        14:30:00 23:59:59                 Charisse MARTINEZ                 51      

 

        2021 Outpatient                 [facility]    IE    [id number]

365 Memoria



        14:30:00 14:30:00                                         51      l



                                                                        Barron

 

        2021 Outpatient                 [facility]     [id number]

[address] [place] [address]



        00:00:00 00:00:00                                         398     Method

i



                                                                        st

 

        2021 Between                 [facility] 3467

[id number] Memoria



        13:38:03 13:38:03 Visit                   r       Medicine 57      l



                                                        Rudy Hines

n

 

        2021 Between                 [facility] 3467

[id number] Memoria



        13:38:03 13:38:03 Visit                   r       Medicine 57      l



                                                        Rudy Hines

n

 

        2021 Outpatient                 [facility]    [facility]    [id number]

375 



        08:38:03 08:38:03                                         57      

 

        2021 Outpatient         DRUMED,  [facility]     [id number]

980 [place]



        00:00:00 00:00:00                 RAZIUDDIN                 554     Meth

farhana



                                                                        st

 

        2021 Inpatient         ANAIS [facility]     025     

461293 [place]



        00:00:00 00:00:00                 MELVIN                   541     Method

i



                                                                        st

 

        2021 Inpatient         ANAIS [facility]     Ayse     2100

087224 [place]



        00:00:00 00:00:00                 MELVIN                   559     Method

i



                                                                        st

 

        2020 Inpatient         ANAIS [facility]     012     

101106 [place]



        00:00:00 00:00:00                 MELVIN                   271     Method

i



                                                                        st

 

        2020 Outpatient                 [facility]Fl[facility] 3

[id number] Memoria



        16:30:00 05:59:59                         r       Medicine 50      l



                                                        Rudy Hines

n

 

        2020 Outpatient                 [facility]Fl[facility] 3

[id number] Memoria



        16:30:00 05:59:59                         r       Medicine 50      l



                                                        Rudy Hines

n

 

        2020 Outpatient         [place],  [facility]    MG    [id number]

365 



        10:30:00 23:59:59                 Charisse N                 50      

 

        2020 Outpatient                 [facility]    IE    [id number]

365 Memoria



        10:30:00 10:30:00                                         50      l



                                                                        Barron

 

        2020 Inpatient         SOLIP[place], [facility]     012     [id number]

[address] [place]



        00:00:00 00:00:00                 MELISSA                    464     Method

i



                                                                        st

 

        2020-10-23 2020 Inpatient         SOLIP[place]AM, [facility]     012     [id number]

[address] [place]



        00:00:00 00:00:00                 MELISSA                    557     Method

i



                                                                        st

 

        2020-10-23 2020-10-24 Outpt Diag                 nullFlavo [facility]    [id number]

19123 Memoria



        18:10:00 04:59:00 Services                 r       Outpatient 06      l



                                                        Imaging         Barron



                                                        Knapp         

 

        2020-10-23 2020-10-24 Outpt Diag                 nullFlavo [facility]    [id number]

85319 Memoria



        18:10:00 04:59:00 Services                 r       Outpatient 06      l



                                                        Imaging         Barron



                                                        Knapp         

 

        2020-10-23 2020-10-23 Outpatient         Jazz, MH29    29    [id number]

[id number] 



        13:10:00 23:59:00                 Abdi MARTINEZ                 06      

 

        2020-10-21 2020-10-22 Between                 nullFlavo [facility] 3467

[id number] Memoria



        17:58:19 17:58:19 Visit                   r       Medicine 56      l



                                                        Rudy Hines

n

 

        2020-10-21 2020-10-22 Between                 nullFlavo [facility] 3467

[id number] Memoria



        17:58:19 17:58:19 Visit                   r       Medicine 56      l



                                                        Rudy Hines

n

 

        2020-10-21 2020-10-22 Outpatient                 [facility]    [facility]    [id number]

375 



        12:58:19 12:58:19                                         56      

 

        2020-10-13 2020-10-14 Outpatient                 [facility]Flavo [facility]

[id number] Memoria



        15:15:00 04:59:59                         r       Medicine 49      l



                                                        Rudy Hines

n

 

        2020-10-13 2020-10-14 Outpatient                 [facility]Flavo [facility]

[id number] Memoria



        15:15:00 04:59:59                         r       Medicine 49      l



                                                        Rudy Hines

michelle

 

        2020-10-13 2020-10-13 Outpatient         [place],  MG    [facility]    [id number]

365 



        10:15:00 23:59:59                 Charisse MARTINEZ                 49      

 

        2020-10-13 2020-10-13 Outpatient                 [facility]    [id number]

365 Memoria



        10:15:00 10:15:00                                         49      l



                                                                        Barron

 

        2020 Outpt Diag                 nullFlavo [facility]    [id number]

[id number] Memoria



        17:02:00 04:59:00 Services                 r       Outpatient 05      l



                                                        Imaging         Barron



                                                        Knapp         

 

        2020 Outpt Diag                 nullFlavo [facility]    [id number]

[id number] Memoria



        17:02:00 04:59:00 Services                 r       Outpatient 05      l



                                                        Imaging         Barron



                                                        Knapp         

 

        2020 Outpatient         Jazz [facility]29    29    [id number]

7385 



        12:02:00 23:59:00                 Abdi MARTINEZ                 05      

 

        2020 Ambulatory                 nullFlavo [facility]    [id number]

[id number] Memoria



        19:00:00 19:00:00 Pre-Reg                 r       Nephrology 46      l



                                                        Rudy martinez

 

        2020 Ambulatory                 [facility]Flavo [facility]    [id number]

15781 Memoria



        19:00:00 19:00:00 Pre-Reg                 r       Nephrology 46      l



                                                        Rudy martinez

 

        2020 Outpatient                 IE    IE    [id number]

365 Memoria



        14:00:00 14:00:00                                         46      dennis



                                                                        Barron

 

        2020 Outpatient         [facility]    346

[id number] 



        14:00:00 14:00:00                 Daca,                   46      



                                        Gabriela CLARK                         

 

        2020 Outpatient                 IE    IE    [id number]

365 Memoria



        11:15:00 11:15:00                                         47      dennis



                                                                        [place]

 

        2020 Outpatient                 IE    IE    [id number]

365 [facility]oria



        11:15:00 11:15:00                                         47      dennis



                                                                        Barron

 

        2020 Outpatient                 [facility]Flavo [facility]    [id number]

[id number] Memoria



        19:45:00 04:59:59                         r       Nephrology 48      l



                                                        Rudy martinez

 

        2020 Outpatient                 [facility]Flavo [facility]    [id number]

[id number] Memoria



        19:45:00 04:59:59                         r       Nephrology 48      l



                                                        Rudy martinez

 

        2020 Outpatient         [facility]    346

[id number] 



        14:45:00 23:59:59                 Dacalyssia                   48      



                                        Gabriela CLARK                         

 

        2020 Outpatient                 IE    IE    [id number]

365 Mem[facility]



        14:45:00 14:45:00                                         48      dennis



                                                                        Barron

 

        2020 [facility]         [facility]     [id number]

[address] [place] [address]



        00:00:00 00:00:00                 EMILIA                    832     Method

i



                                                                        st

 

        2020 Phone                   nullFlavo [facility]    [id number]

55 Memoria



        16:17:50 04:59:59 Message                 r       Nephrology 12      l



                                                        Rudy Hines

michelle

 

        2020 Phone                   [facility]avo [facility]    [id number]

55 Memoria



        16:17:50 04:59:59 Message                 r       Nephrology 12      dennis Hines

n

 

        2020 Outpatient                 [facility]    MG    [id number]

355 



        11:17:50 23:59:59                                         12      

 

        2020 Outpatient                 nullFlavo MG    [id number]

[id number] Memoria



        19:00:00 04:59:59                         r       Nephrology 41      l



                                                        Rudy Hines

n

 

        2020 Outpatient                 nullFlavo MG    [id number]

[id number] Memoria



        19:00:00 04:59:59                         r       Nephrology 41      l



                                                        Rudy Hines

n

 

        2020 Outpatient         [place] MG    MG    [id number]

[id number] 



        14:00:00 23:59:59                 Daca,                   41      



                                        Gabriela EDUARDO                         

 

        2020 [facility]    [id number]

365 [facility]



        14:00:00 14:00:00                                         41      dennis



                                                                        Barron

 

        2020 Phone                   nullFlavo MG    [id number]

55 Memoria



        18:35:51 04:59:59 Message                 r       Nephrology 11      dennis Mart

 

        2020 Phone                   nullFlavo MG    [id number]

55 Memoria



        18:35:51 04:59:59 Message                 r       Nephrology 11      dennis Mart

 

        2020 Outpatient                 MG    MG    [id number]

355 



        13:35:51 23:59:59                                         11      

 

        2020 Outpatient                 [facility]

[id number] Memoria



        15:15:00 04:59:59                         r       Medicine 45      l



                                                        Rudy Hines

n

 

        2020 Outpatient                 [facility]Flavo [facility]

[id number] Memoria



        15:15:00 04:59:59                         r       Medicine 45      l



                                                        Rudy Hines

n

 

        2020 Outpatient         [place],  MG    MG    [id number]

365 



        10:15:00 23:59:59                 Charisse MARTINEZ                 45      

 

        2020 Ambulatory                 [facility]

[id number] Memoria



        15:15:00 15:15:00 Pre-Reg                 r       Medicine 44      l



                                                        Grantshyanne Rosariosylvie martinez

 

        2020 Ambulatory                 [facility]

[id number] Memoria



        15:15:00 15:15:00 Pre-Reg                 r       Medicine 44      l



                                                        Rudy Hines

michelle

 

        2020 Outpatient                 [facility]    IE    [id number]

365 [facility]



        10:15:00 10:15:00                                         45      dennis



                                                                        Barron

 

        2020 Outpatient                 [facility]    IE    [id number]

365 [facility]



        10:15:00 10:15:00                                         44      dennis



                                                                        Barron

 

        2020 Outpatient         [place],  MG    MG    [id number]

365 



        10:15:00 10:15:00                 Charisse N                 44      

 

        2020 Phone                   nullFlavo MG    [id number]

55 Memoria



        13:23:32 04:59:59 Message                 r       Nephrology 10      dennis Hines

michelle

 

        2020 Phone                   nullFlavo MG    [id number]

55 Memoria



        13:23:32 04:59:59 Message                 r       Nephrology 10      dennis Grant         Donny martinez

 

        2020 Outpatient                 MG    MG    [id number]

355 



        08:23:32 23:59:59                                         10      

 

        2020 Between                 nullFlavo [facility] 3467

[id number] Memoria



        14:14:54 14:14:54 Visit                   r       Medicine 52      l



                                                        Rudy         Donny martinez

 

        2020 Between                 nullFlavo [facility] 3467

[id number] Memoria



        14:14:54 14:14:54 Visit                   r       Medicine 52      l



                                                        Grant         Donny martinez

 

        2020 Outpatient                 MG    MG    [id number]

375 



        09:14:54 09:14:54                                         52      

 

        2020 Outpatient                 [facility]    IE    [id number]

365 [facility]



        11:30:00 11:30:00                                         43      dennis Mart

 

        2020 Outpatient                 [facility]    IE    [id number]

365 [facility]



        11:30:00 11:30:00                                         43      dennis Mart

 

        2020 2020-04-15 Phone                   nullFlavo [facility]67

[id number] Memoria



        20:00:15 04:59:59 Message                 r       Medicine 09      l



                                                        Rudy         Donny martinez

 

        2020 2020-04-15 Phone                   [facility] 3467

[id number] Memoria



        20:00:15 04:59:59 Message                 r       Medicine 09      dennis Hines

n

 

        2020 Outpatient                 [facility]    [facility]    [id number]

355 



        15:00:15 23:59:59                                         09      

 

        2020-04-10 2020 Phone                   null[facility] 3467

[id number] Memoria



        17:18:28 04:59:59 Message                 r       Medicine 08      dennis Hines

n

 

        2020-04-10 2020 Phone                   [facility] 3467

[id number] Memoria



        17:18:28 04:59:59 Message                 r       Medicine 08      dennis Hines

michelle

 

        2020-04-10 2020 Phone                   nullFlavo MG    [id number]

55 Memoria



        13:26:55 04:59:59 Message                 r       Nephrology 07      dennis Hines

micehlle

 

        2020-04-10 2020 Phone                   nullFlavo MG    [id number]

55 Memoria



        13:26:55 04:59:59 Message                 r       Nephrology 07      dennis Hines

michelle

 

        2020-04-10 2020 Outpatient                 [facility]    [facility]    [id number]

355 



        12:18:28 23:59:59                                         08      

 

        2020-04-10 2020 Outpatient                 [facility]    [facility]    [id number]

355 



        08:26:55 23:59:59                                         07      

 

        2020 Outpatient                 [facility]

[id number] Memoria



        20:15:00 05:59:59                         r       Medicine 42      l



                                                        Rudy martinez

 

        2020 Outpatient                 [facility]

[id number] Memoria



        20:15:00 05:59:59                         r       Medicine 42      l



                                                        Rudy martinez

 

        2020 Outpatient         [place],  [facility]    [facility]    [id number]

365 



        14:15:00 23:59:59                 Charisse MARTINEZ                 42      

 

        2020 Outpatient                 [facility]    [facility]    [id number]

365 Memoria



        14:15:00 14:15:00                                         Gildardo Mart

 

        2020 Phone                   [facility]Flavo [facility]

[id number] Memoria



        14:22:27 05:59:59 Message                 r       Medicine 06      dennis martinez

 

        2020 Phone                   [facility] 3467

[id number] Memoria



        14:22:27 05:59:59 Message                 r       Medicine 06      dennis martinez

 

        2020 Outpatient                 [facility]    MG    [id number]

355 



        08:22:27 23:59:59                                         06      

 

        2019 Phone                   [facility] 3467

[id number] Memoria



        16:06:04 05:59:59 Message                 r       Medicine 05      dennis martinez

 

        2019 Phone                   [facility] 3467

[id number] Memoria



        16:06:04 05:59:59 Message                 r       Medicine 05      dennis martinez

 

        2019 Outpatient                 [facility]    [facility]    [id number]

355 



        10:06:04 23:59:59                                         05      

 

        2019 Between                 nullFlavo MG    [id number]

75 Memoria



        20:05:03 20:05:03 Visit                   r       Nephrology 45      dennis Mart

 

        2019 Between                 nullFlavo MG    [id number]

75 Memoria



        20:05:03 20:05:03 Visit                   r       Nephrology 45      dennis Mart

 

        2019 Outpatient                 [facility]    [facility]    [id number]

375 



        14:05:03 14:05:03                                         45      

 

        2019 Outpatient                 nullFlavo MG    [id number]

[id number] Memoria



        20:45:00 05:59:59                         r       Nephrology 38      dennis Hines

michelle

 

        2019 Outpatient                 nullFlavo MG    [id number]

[id number] Memoria



        20:45:00 05:59:59                         r       Nephrology 38      dennis Hines

michelle

 

        2019 Outpatient         [place]- MG    MG    346

[id number] 



        14:45:00 23:59:59                 Brianda Gallegos                         

 

        2019 Outpatient                 [facility]    [facility]    [id number]

365 Memoria



        14:45:00 14:45:00                                         38      dennis Mart

 

        2019 Between                 nullFlavo MG    [id number]

75 Memoria



        00:07:10 00:07:10 Visit                   r       Nephrology 43      dennis Mart

 

        2019 Between                 nullFlavo MG    [id number]

75 Memoria



        00:07:10 00:07:10 Visit                   r       Nephrology 43      dennis Mart

 

        2019 Outpatient                 MG    MG    [id number]

375 



        18:07:10 18:07:10                                         43      

 

        2019 Outpatient                 IE    IE    [id number]

365 Memoria



        09:00:00 09:00:00                                         39      dennis Mart

 

        2019 Outpatient                 [facility]    IE    [id number]

365 [facility]



        09:00:00 09:00:00                                         39      dennis Mart

 

        2019 Between                 [facility] 3467

[id number] Memoria



        21:47:12 21:47:12 Visit                   r       Medicine 41      dennis Hines

michelle

 

        2019 Between                 [facility] 3467

[id number] Memoria



        21:47:12 21:47:12 Visit                   r       Medicine 41      dennis Hines

michelle

 

        2019 Outpatient                 [facility]    [id number]

375 



        15:47:12 15:47:12                                         41      

 

        2019-10-29 2019-10-30 Between                 nullFlavo [facility] 3467

[id number] Memoria



        22:13:13 22:13:13 Visit                   r       Medicine 40      dennis Hines

michelle

 

        2019-10-29 2019-10-30 Between                 nullFlavo [facility] 3467

[id number] Memoria



        22:13:13 22:13:13 Visit                   r       Medicine 40      dennis Hines

michelle

 

        2019-10-29 2019-10-30 Outpatient                 MG    MG    [id number]

375 



        17:13:13 17:13:13                                         40      

 

        2019-10-25 2019-10-26 Outpatient                 [facility]

[id number] Memoria



        14:45:00 04:59:59                         r       Medicine 40      dennis Hines

michelle

 

        2019-10-25 2019-10-26 Outpatient                 [facility]

[id number] Memoria



        14:45:00 04:59:59                         r       Medicine 40      l



                                                        Grant         Donny martinez

 

        2019-10-25 2019-10-25 Outpatient         [place],  MG    [facility]    [id number]

365 



        09:45:00 23:59:59                 Charisse N                 40      

 

        2019-10-25 2019-10-25 Outpatient                 [facility]    [facility]    [id number]

365 Memoria



        09:45:00 09:45:00                                         40      dennis



                                                                        Barron

 

        2019-10-17 2019-10-17 Ambulatory                 [facility]

[id number] Memoria



        16:00:00 16:00:00 Pre-Reg                 r       Medicine 36      dennis Hines

n

 

        2019-10-17 2019-10-17 Ambulatory                 [facility]

[id number] Memoria



        16:00:00 16:00:00 Pre-Reg                 r       Medicine 36      dennis Hines

n

 

        2019-10-17 2019-10-17 Outpatient                 [facility]    [facility]    [id number]

365 Memoria



        11:00:00 11:00:00                                         36      dennis



                                                                        Barron

 

        2019-10-17 2019-10-17 Outpatient         [place],  MG    MG    [id number]

365 



        11:00:00 11:00:00                 Charisse N                 36      

 

        2019-10-10 2019-10-11 Outpatient                 nullFlavo MG    [id number]

[id number] [facility]oria



        15:00:00 04:59:59                         r       Nephrology 35      dennis Hines

n

 

        2019-10-10 2019-10-11 Outpatient                 nullFlavo MG    [id number]

[id number] Memoria



        15:00:00 04:59:59                         r       Nephrology 35      dennis Hines

n

 

        2019-10-10 2019-10-10 Outpatient         [place],  MG    MG    [id number]

365 



        10:00:00 23:59:59                 Charisse N                 35      

 

        2019-10-10 2019-10-10 Outpatient                 [facility]    IE    [id number]

365 Memoria



        12:00:00 12:00:00                                         37      dennis



                                                                        Barron

 

        2019-10-10 2019-10-10 Outpatient                 [facility]    [facility]    [id number]

365 Memoria



        12:00:00 12:00:00                                         37      dennis



                                                                        Barron

 

        2019-10-10 2019-10-10 Outpatient                 [facility]    [facility]    [id number]

365 Memoria



        10:00:00 10:00:00                                         35      dennis



                                                                        Barron

 

        2019 Phone                   [facility]67

[id number] Memoria



        15:15:06 04:59:59 Message                 r       Medicine 04      dennis Hines

n

 

        2019 Phone                   [facility]Flavo [facility] 3467

[id number] Memoria



        15:15:06 04:59:59 Message                 r       Medicine 04      dennis martinez

 

        2019 Phone                   nullFlavo MG    [id number]

55 Memoria



        15:10:51 04:59:59 Message                 r       Nephrology 03      dennis martinez

 

        2019 Phone                   nullFlavo MG    [id number]

55 Memoria



        15:10:51 04:59:59 Message                 r       Nephrology 03      dennis martinez

 

        2019 Outpatient                 MG    MG    [id number]

355 



        10:15:06 23:59:59                                         04      

 

        2019 Outpatient                 MG    MG    [id number]

355 



        10:10:51 23:59:59                                         03      

 

        2019 Ambulatory                 nullFlavo MG    [id number]

[id number] Memoria



        20:00:00 20:00:00 Pre-Reg                 r       Nephrology 34      dennis martinez

 

        2019 Ambulatory                 nullFlavo MG    [id number]

[id number] Memoria



        20:00:00 20:00:00 Pre-Reg                 r       Nephrology 34      dennis martinez

 

        2019 Outpatient                 [facility]    [id number]

365 Memoria



        15:00:00 15:00:00                                         34      dennis Mart

 

        2019 Outpatient         [place] MG    [facility]    346

[id number] 



        15:00:00 15:00:00                 Tate Gallegos                         

 

        2019 Between                 nullFlavo MG    [id number]

75 Memoria



        20:15:16 20:15:16 Visit                   r       Nephrology 34      dennis Mart

 

        2019 Between                 nullFlavo MG    [id number]

75 Memoria



        20:15:16 20:15:16 Visit                   r       Nephrology 34      dennis Mart

 

        2019 Outpatient                 MG    MG    [id number]

375 



        15:15:16 15:15:16                                         34      

 

        2019 Phone                   nullFlavo MG    [id number]

55 [facility]



        15:29:54 04:59:59 Message                 r       Nephrology 02      dennis Mart

 

        2019 Phone                   nullFlavo MG    [id number]

55 [facility]



        15:29:54 04:59:59 Message                 r       Nephrology 02      dennis Mart

 

        2019 Outpatient                 [facility]    [facility]    [id number]

355 



        10:29:54 23:59:59                                         02      

 

        2019 Ambulatory                 nullFlavo MG    [id number]

[id number] [facility]



        16:30:00 16:30:00 Pre-Reg                 r       Nephrology 29      dennis martinez

 

        2019 Ambulatory                 nullFlavo MG    [id number]

[id number] [facility]



        16:30:00 16:30:00 Pre-Reg                 r       Nephrology 29      dennis martinez

 

        2019 Ambulatory                 nullFlavo MG    [id number]

27381 [facility]



        16:15:00 16:15:00 Pre-Reg                 r       Nephrology 30      dennis martinez

 

        2019 Ambulatory                 nullFlavo MG    [id number]

06836 [facility]



        16:15:00 16:15:00 Pre-Reg                 r       Nephrology 30      dennis martinez

 

        2019 Ambulatory                 nullFlavo [facility] 3

[id number] Memoria



        15:15:00 15:15:00 Pre-Reg                 r       Medicine 31      dennis martinez

 

        2019 Ambulatory                 nullFlavo [facility]

[id number] [facility]



        15:15:00 15:15:00 Pre-Reg                 r       Medicine 31      dennis martinez

 

        2019 Outpatient                 [facility]    IE    [id number]

365 [facility]



        11:30:00 11:30:00                                         29      dennis Mart

 

        2019 Outpatient         [place]    MG    346

[id number] 



        11:30:00 11:30:00                 Marco A Gallegos                         

 

        2019 Outpatient                 [facility]    CHELSEYIE    [id number]

365 [facility]



        11:15:00 11:15:00                                         30      dennis



                                                                        Barron

 

        2019 Outpatient         [place]    MG    346

[id number] 



        11:15:00 11:15:00                 Jamil Gallegos                         

 

        2019 Outpatient                 [facility]    [facility]    [id number]

365 [facility]



        10:15:00 10:15:00                                         31      dennis



                                                                        [place]

 

        2019 Outpatient         [place],  [facility]    [facility]    [id number]

365 



        10:15:00 10:15:00                 Charisse MARTINEZ                 31      

 

        2019 Inpatient PABLITO ADEN,   [facility]     TELE    [id number]

[address] [place]



        10:05:00 17:55:00                 GOPAL                         [facility]

[facility]

 

        2019 Outpatient PABLITO ADEN,   [facility]     TELE    [id number]

427 [place]



        21:36:00 19:00:00                 GOPAL                         Medica

[facility]

 

        2019 Outpatient                 nullFlavo [facility] Urgent 346

[id number] Memoria



        20:40:00 04:59:59                         r       Care    33      l



                                                        [facility]

 

        2019 Outpatient                 nullFlavo MH Urgent 346

[id number] Memoria



        20:40:00 04:59:59                         r       Care    33      l



                                                        [place]         [place]

 

        2019 Outpatient         Van     [facility]    [facility]    [id number]

365 



        15:40:00 23:59:59                 Mitch Brooke      



                                        Yousif kirkpatrick                         



                                        Esc[place]                           

 

        2019 Outpatient                 [facility]    [facility]    [id number]

365 [facility]



        15:40:00 15:40:00                                         33      dennis Mart

 

        2019 Outpatient                 nullFlavo [facility] 3

[id number] [facility]oria



        15:15:00 04:59:59                         r       Medicine 32      l



                                                        Rudy martinez

 

        2019 Outpatient                 nullFlavo [facility]

[id number] [facility]



        15:15:00 04:59:59                         r       Medicine 32      l



                                                        Rudy martinez

 

        2019 Outpatient         [place]    [facility]    346

[id number] 



        10:15:00 23:59:59                 Kristie Gallegos                         

 

        2019 Outpatient                 [facility]    [facility]    [id number]

365 [facility]



        10:15:00 10:15:00                                         32      dennis Mart

 

        2019 Between                 nullFlavo [facility] Family 3467

[id number] [facility]



        19:00:16 19:00:16 Visit                   r       Medicine 29      dennis Hines

n

 

        2019 Between                 nullFlavo MG Family 3467

[id number] [facility]



        19:00:16 19:00:16 Visit                   r       Medicine 29      dennis Hines

n

 

        2019 Outpatient                 MG    MG    [id number]

375 



        14:00:16 14:00:16                                         29      

 

        2019 2019-03-15 Between                 nullFlavo MG    [id number]

75 [facility]



        15:02:37 15:02:37 Visit                   r       Nephrology 27      dennis Hines

n

 

        2019 2019-03-15 Between                 nullFlavo MG    [id number]

75 [facility]



        15:02:37 15:02:37 Visit                   r       Nephrology 27      dennis Hines

n

 

        2019 2019-03-15 Outpatient                 MG    MG    [id number]

375 



        10:02:37 10:02:37                                         27      

 

        2019 2019-03-15 Outpatient                 nullFlavo MG    [id number]

[id number] [facility]



        18:45:00 04:59:59                         r       Nephrology 28      dennis Hines

n

 

        2019 2019-03-15 Outpatient                 nullFlavo MG    [id number]

[id number] [facility]



        18:45:00 04:59:59                         r       Nephrology 28      dennis Hines

n

 

        2019 2019-03-15 Outpatient                 nullFlavo MG Family 3

[id number] Memoria



        14:15:00 04:59:59                         r       Medicine 22      dennis Hines

n

 

        2019 2019-03-15 Outpatient                 nullFlavo MG Family 3

[id number] [facility]oria



        14:15:00 04:59:59                         r       Medicine 22      dennis Hines

n

 

        2019 Outpatient         [place] MG    MG    346

[id number] 



        13:45:00 23:59:59                 Husam Gallegos                         

 

        2019 Outpatient         [place],  MG    MG    [id number]

365 



        09:15:00 23:59:59                 Charisse MARTINEZ                 22      

 

        2019 Outpatient                 [facility]    IE    [id number]

365 [facility]



        13:45:00 13:45:00                                         Husam Mart

 

        2019 Outpatient                 [facility]    [id number]

365 [facility]



        09:15:00 09:15:00                                         22      dennis



                                                                        [place]

 

        2019 Between                 nullFlavo MG    [id number]

75 [facility]



        23:59:47 23:59:47 Visit                   r       Nephrology 26      dennis Hines

michelle

 

        2019 Between                 nullFlavo MG    [id number]

75 [facility]



        23:59:47 23:59:47 Visit                   r       Nephrology 26      dennis Hines

michelle

 

        2019 Outpatient                 [facility]    [id number]

375 



        18:59:47 18:59:47                                         26      

 

        2019 Between                 nullFlavo MG    [id number]

75 [facility]



        22:00:33 22:00:33 Visit                   r       Nephrology 24      l



                                                        Rudy Hines

michelle

 

        2019 Between                 nullFlavo MG    [id number]

75 [facility]



        22:00:33 22:00:33 Visit                   r       Nephrology 24      dennis Hines

michelle

 

        2019 Outpatient                 [facility]    [id number]

375 



        16:00:33 16:00:33                                         24      

 

        2019 Between                 nullFlavo MG    [id number]

75 [facility]



        04:48:44 04:48:44 Visit                   r       Nephrology 23      dennis Hines

michelle

 

        2019 Between                 nullFlavo MG    [id number]

75 [facility]



        04:48:44 04:48:44 Visit                   r       Nephrology 23      dennis Hines

michelle

 

        2019 Outpatient                 [facility]    [id number]

375 



        22:48:44 22:48:44                                         23      

 

        2019 Ambulatory                 nullFlavo MG    [id number]

[id number] [facility]



        20:30:00 20:30:00 Pre-Reg                 r       Nephrology 27      dennis Hines

michelle

 

        2019 Ambulatory                 nullFlavo MG    [id number]

[id number] [facility]oria



        20:30:00 20:30:00 Pre-Reg                 r       Nephrology 27      dennis Hines

michelle

 

        2019 Ambulatory                 nullFlavo MG    [id number]

[id number] [facility]



        18:40:00 18:40:00 Pre-Reg                 r       Nephrology 24      dennis martinez

 

        2019 Ambulatory                 nullFlavo [facility]    [id number]

[id number] [facility]



        18:40:00 18:40:00 Pre-Reg                 r       Nephrology 24      dennis martinez

 

        2019 Outpatient                 [facility]    [facility]    [id number]

365 [facility]



        14:30:00 14:30:00                                         27      dennis



                                                                        Barron

 

        2019 Outpatient         [place]    MG    346

[id number] 



        14:30:00 14:30:00                 Zuleyma Gallegos                         

 

        2019 Outpatient         [place]    MG    346

[id number] 



        14:30:00 14:30:00                 Zuleyma Gallegos                         

 

        2019 Outpatient                 [facility]    [facility]    [id number]

365 [facility]



        12:40:00 12:40:00                                         24      dennis Rosarioann

 

        2019 Outpatient         [place]    MG    346

[id number] 



        12:40:00 12:40:00                 Michael Gallegos                         

 

        2019 Outpatient         [place]    MG    346

[id number] 



        12:40:00 12:40:00                 Michael Gallegos                         

 

        2019 Ambulatory                 nullFlavo MG    [id number]

65535 [facility]



        15:30:00 15:30:00 Pre-Reg                 r       Internal 25      l



                                                        Medicine         Barron Grant         

 

        2019 Ambulatory                 nullFlavo MG    [id number]

[id number] [facility]



        15:30:00 15:30:00 Pre-Reg                 r       Internal 25      l



                                                        Medicine         Barron Grant         

 

        2019 Outpatient                 [facility]    [facility]    [id number]

365 [facility]



        09:30:00 09:30:00                                         25      dennis Mart

 

        2019 Outpatient                 [facility]    [facility]    [id number]

365 



        09:30:00 09:30:00                                         25      

 

        2018 2018-10-20 Ambulatory                 nullFlavo [facility]    [id number]

03686 [facility]



        12:45:00 12:45:00 Pre-Reg                 r       Internal 23      l



                                                        Manuel Palacioberg         

 

        2018 2018-10-20 Ambulatory                 nullFlavo MG    [id number]

[id number] [facility]



        12:45:00 12:45:00 Pre-Reg                 r       Internal 23      dennis Palacioberg         

 

        2018 2018-10-20 Outpatient                 [facility]    MG    [id number]

365 



        07:45:00 07:45:00                                         23      

 

        2018 Outpatient                 nullFlavo MG    [id number]

12056 [facility]



        19:15:00 04:59:59                         r       Nephrology 26      dennis martinez

 

        2018 Outpatient                 nullFlavo MG    [id number]

[id number] Memoria



        19:15:00 04:59:59                         r       Nephrology 26      dennis martinez

 

        2018 Outpatient                 nullFlavo MG    [id number]

[id number] [facility]



        18:00:00 04:59:59                         r       Nephrology 21      dennis Hines

michelle

 

        2018 Outpatient                 nullFlavo MG    [id number]

[id number] [facility]



        18:00:00 04:59:59                         r       Nephrology 21      dennis Hines

michelle

 

        2018 Outpatient         [place] MG    MG    346

[id number] 



        14:15:00 23:59:59                 Daca,                   Tay      



                                        Gabriela J                         

 

        2018 Outpatient         [place] MG    MG    346

[id number] 



        13:00:00 23:59:59                 Daca,                   21      



                                        Gabriela EDUARDO                         

 

        2018 Outpatient                 IE    IE    [id number]

365 [facility]



        14:15:00 14:15:00                                         26      dennis



                                                                        Barron

 

        2018 Outpatient                 [facility]    IE    [id number]

365 [facility]



        13:00:00 13:00:00                                         21      dennis



                                                                        [place]ron

 

        2018 Outpatient                 [facility]    IE    [id number]

365 [facility]



        07:45:00 07:45:00                                         23      dennis



                                                                        [place]ron

 

        2018 Outpatient                 nullFlavo MG Family 3

[id number] [facility]oria



        18:30:00 04:59:59                         r       Medicine 20      dennis Hines

michelle

 

        2018 Outpatient                 nullFlavo MG Family 3

[id number] Memoria



        18:30:00 04:59:59                         r       Medicine 20      dennis Hnies

n

 

        2018 Outpatient         [place],  [facility]    MG    [id number]

365 



        13:30:00 23:59:59                 Charisse N                 20      

 

        2018 Outpatient                 [facility]    [facility]    [id number]

365 [facility]



        13:30:00 13:30:00                                         20      dennis Mart

 

        2018 Outpatient                 nullFlavo MG    [id number]

[id number] [facility]



        16:00:00 04:59:59                         r       Nephrology 19      l



                                                        Rudy Hines

n

 

        2018 Outpatient                 nullFlavo MG    [id number]

[id number] [facility]



        16:00:00 04:59:59                         r       Nephrology 19      dennis Hines

michelle

 

        2018 Outpatient         [place]    MG    346

[id number] 



        11:00:00 23:59:59                 Daca,                   Toney CLARK                         

 

        2018 Outpatient         [place] MG    MG    346

[id number] 



        11:00:00 23:59:59                 Daca,                   19      



                                        Gabriela J                         

 

        2018 Outpatient                 [facility]    IE    [id number]

365 [facility]



        11:00:00 11:00:00                                         19      dennis Mart

 

        2018 Outpatient                 nullFlavo [facility] 3

[id number] [facility]oria



        15:00:00 04:59:59                         r       Medicine 18      dennis Hines

michelle

 

        2018 Outpatient                 nullFlavo [facility] 3

[id number] Memoria



        15:00:00 04:59:59                         r       Medicine 18      dennis Hines

n

 

        2018 Outpatient         [place],  [facility]    MG    [id number]

365 



        10:00:00 23:59:59                 Charisse N                 18      

 

        2018 Outpatient         [place],  [facility]    MG    [id number]

365 



        10:00:00 23:59:59                 Charisse N                 18      

 

        2018 Outpatient                 [facility]    [facility]    [id number]

365 [facility]



        10:00:00 10:00:00                                         18      dennis Mart

 

        2017 Outpatient                 [facility]    [facility]    [id number]

365 [facility]



        10:40:00 10:40:00                                         16      dennis



                                                                        Barron

 

        2017 Outpatient                 [facility]    [facility]    [id number]

365 [facility]



        10:40:00 10:40:00                                         16      dennis



                                                                        [place]

 

        2017 Outpatient                 [facility]    [facility]    [id number]

365 [facility]



        11:30:00 11:30:00                                         17      dennis



                                                                        [place]ron

 

        2017 Outpatient                 [facility]    [facility]    [id number]

365 [facility]



        11:30:00 11:30:00                                         17      dennis



                                                                        [place]ron

 

        2017 Outpatient                 [facility]    [facility]    [id number]

365 [facility]



        11:40:00 11:40:00                                         11      dennis



                                                                        Barron

 

        2017 Outpatient                 [facility]    [facility]    [id number]

365 [facility]



        11:40:00 11:40:00                                         11      dennis



                                                                        [place]

 

        2017 Outpatient                 [facility]    [facility]    [id number]

365 [facility]



        14:30:00 14:30:00                                         15      dennis



                                                                        [place]

 

        2017 Outpatient                 [facility]    [facility]    [id number]

365 [facility]



        14:30:00 14:30:00                                         15      ednnis



                                                                        Barron

 

        2017 Outpatient                 [facility]    [facility]    [id number]

365 [facility]



        10:00:00 10:00:00                                         08      dennis



                                                                        [place]ron

 

        2017 Outpatient                 [facility]    [facility]    [id number]

365 [facility]



        10:00:00 10:00:00                                         08      dennis Mart

 

        2017 Bedded                  [facility] [id number]

375 [facility]



        11:48:35 14:30:00 Outpatient                 r       Barron 13      l



                                                        [place]         Meredith

nn

 

        2017 Bedded                  [facility] [id number]

375 [facility]



        11:48:35 14:30:00 Outpatient                 ankit Mart 13      l



                                                        [place]         Meredith

nn

 

        2017 Outpatient         CHELSEY CunninghamSL    [facility]    [id number]

[id number] 



        05:48:35 08:30:00                 Randy Velsaco      

 

        2017 Outpatient                 [facility]    [facility]    [id number]

365 [facility]



        13:15:00 13:15:00                                         14      l



                                                                        Barron

 

        2017 Outpatient                 [facility]    [facility]    [id number]

365 [facility]



        13:15:00 13:15:00                                         14      dennis



                                                                        [place]

 

        2016 Outpatient                 [facility]    [facility]    [id number]

365 [facility]



        09:30:00 09:30:00                                         12      dennis



                                                                        [place]ron

 

        2016 Outpatient                 [facility]    [facility]    [id number]

365 [facility]



        09:30:00 09:30:00                                         13      dennis



                                                                        Barron

 

        2016 Outpatient                 [facility]    [facility]    [id number]

365 [facility]



        09:30:00 09:30:00                                         12      dennis



                                                                        Barron

 

        2016 Outpatient                 [facility]    [facility]    [id number]

365 [facility]



        09:30:00 09:30:00                                         13      dennis



                                                                        Barron

 

        2016 Outpatient                 [facility]    [facility]    [id number]

365 [facility]



        10:20:00 10:20:00                                         09      dennis



                                                                        Barron

 

        2016 Outpatient                 [facility]    [facility]    [id number]

365 [facility]



        10:20:00 10:20:00                                         09      dennis



                                                                        Barron

 

        2016 Outpatient                 [facility]    [facility]    [id number]

365 [facility]



        13:00:00 13:00:00                                         04      l



                                                                        Barron

 

        2016 Outpatient                 [facility]    [facility]    [id number]

365 [facility]



        13:00:00 13:00:00                                         04      dennis Mart

 

        2016 Outpatient                 [facility]    [facility]    [id number]

365 [facility]



        11:15:00 11:15:00                                         06      dennis Mart

 

        2016 Outpatient                 [facility]    [facility]    [id number]

365 [facility]



        11:15:00 11:15:00                                         06      dennis Mart

 

        2016 OP Therapy                 nullFlavo [facility]     [id number]

[id number] [facility]



        13:00:00 04:59:00 Patients                 r       Regulo 03      dennis Mart

 

        2016 OP Therapy                 nullFlavo [facility]     [id number]

[id number] [facility]



        13:00:00 04:59:00 Patients                 r       Regulo 03      dennis Mart

 

        2016 Outpatient         Do   2.16.840. 2.16.840.1. 3

[id number] 



        08:00:00 23:59:00                 Yoan CHAVIS [id number]. [id number] 03    

  



                                                [phone] 5.55            

 

        2016 OP Therapy                 nullFlavo [facility]     [id number]

[id number] [facility]



        17:39:00 04:59:00 Patients                 ankit Rajput 02      dennis Mart

 

        2016 OP Therapy                 nullFlavo [facility]     [id number]

[id number] [facility]



        17:39:00 04:59:00 Patients                 r       Regulo 02      dennis Mart

 

        2016 Outpatient         Do,   2.16.840. 2.16.840.1. 3

[id number] 



        12:39:00 23:59:00                 Yoan CHAVIS [id number]. [id number] 02    

  



                                                [phone] 5.55            

 

        2016 OP Therapy                 [facility]Flavo [facility] [place] [facility]

[id number] [facility]



        19:00:00 04:59:00 Patients                 r       Elim IRA         dennis Mart

 

        2016 OP Therapy                 nullFlavo [facility] [place] [facility]

[id number] [facility]



        19:00:00 04:59:00 Patients                 r       Elim IRA         dennis Mart

 

        2016 Outpatient         Lei,   2.16.840. 2.16.840.1. 3

[id number] 



        14:00:00 23:59:00                 Yoan CHAVIS [id number]    

  



                                                [id number] 5.69            

 

        2016 Outpt Diag                 nullFlavo [facility]    [id number]

55654 [facility]



        16:14:00 04:59:00 Services                 r       Outpatient 04      l



                                                        Imaging         [place]



                                                        [place]         

 

        2016 Outpt Diag                 nullFlavo [facility]    18137

50821 [facility]



        16:14:00 04:59:00 Services                 r       Outpatient 04      l



                                                        Imaging         [place]



                                                        [place]         

 

        [facility]         [facility]    [facility]

[id number] 



        11:14:00 23:59:00                 Roland Gallegos                         

 

        2016 OP Therapy                 nullFlavo [facility] [place] 346

[id number] [facility]



        19:00:00 04:59:00 Patients                 r       [place]   00      dennis Mart

 

        2016 OP Therapy                 [facility]Fl[facility] [place] 346

[id number] [facility]



        19:00:00 04:59:00 Patients                 r       [place]   00      dennis Mart

 

        2016 Outpatient         Do   2.16.840. 2.16.840.1. 3

[id number] 



        14:00:00 23:59:00                 Yoan CHAVIS [id number] 00    

  



                                                [id number] 5.69            

 

        2016 Outpatient                 [facility]    [id number]

365 [facility]



        10:20:00 10:20:00                                         01      dennis Mart

 

        2016 Outpatient                 [facility]    [id number]

365 [facility]



        10:20:00 10:20:00                                         01      dennis Mart

 

        2016-05-10 2016 Outpt Diag                 nullFlavo [facility]    [id number]

54437 [facility]



        14:59:00 04:59:00 Services                 r       Outpatient 03      l



                                                        Imaging         [place] Zarate            

 

        2016-05-10 2016 Outpt Diag                 nullFlavo [facility]    21459

38028 [facility]



        14:59:00 04:59:00 Services                 r       Outpatient 03      l



                                                        Imaging         [place] Zarate            

 

        2016-05-10 2016-05-10 Outpatient         [place] [address]    28    346

[id number] 



        09:59:00 23:59:00                 [place]                  2016 Outpt Diag                 nullFlavo [facility]    60449

62018 [facility]



        18:11:00 04:59:00 Services                 r       Outpatient 01      l



                                                        Imaging         [place]         

 

        2016 Outpt Diag                 nullFlavo [facility]    55841

76225 [facility]



        18:11:00 04:59:00 Services                 r       Outpatient 01      l



                                                        Imaging         [place]



                                                        [place]         

 

        2016 Outpatient         [place] [address]    29    346

[id number] 



        13:11:00 23:59:00                 [place]                  01      

 

        2016-03-10 2016-03-10 Outpatient                 nullFlavo [facility]    [id number]

   [facility]



        12:50:31 19:25:00                         r                       dennis Mart

 

        2016-03-10 2016-03-10 Outpatient                 2.16.840. 2.16.840.1. 3

4107   [facility]



        12:50:31 19:25:00                         [id number]         

l



                                                3.2081.20 81.2000         Donny

n



                                                00                      [facility]



                                                                        l



                                                                        [facility]



                                                                        l [facility]



                                                                        [place]

 

        2016-03-10 2016-03-10 Outpatient                 nullFlavo [facility]    [id number]

   [facility]



        12:50:31 19:25:00                         r                       l



                                                                        [place]

 

        2016 2016-02-10 Outpt Diag                 nullFlavo [facility]    [id number]

50603 [facility]



        12:58:00 05:59:00 Services                 r       Outpatient 00      l



                                                        Imaging         [place]



                                                        [place]         

 

        2016 2016-02-10 Outpt Diag                 nullFlavo [facility]    45514

52941 [facility]



        12:58:00 05:59:00 Services                 r       Outpatient 00      l



                                                        Imaging         [place]



                                                        [place]         

 

        2016 Outpatient         Abbi [place] 29    29    346

[id number] 



        06:58:00 23:59:00                 [place]                  00      

 

        2016 Outpatient                 [facility]    [id number]

365 [facility]



        10:15:00 10:15:00                                         02      l



                                                                        [place]

 

        2016 Outpatient                 [facility]    [id number]

365 [facility]



        10:15:00 10:15:00                                         02      l



                                                                        [place]

 

        2015 Outpatient                 [facility]    [id number]

365 [facility]



        11:30:00 11:30:00                                         00      dennis Mart

 

        2015 Outpatient                 [facility]    [facility]    [id number]

365 [facility]



        11:30:00 11:30:00                                         00      dennis Mart

 

        2014 Outpatient                 nullFlavo [facility] 3467

2273_3 Memoria



        01:16:00 05:59:00                         r       [place] [id number] 



                                                        [place] 1       [facility]

 

        2014 Outpatient                 [facility] 3467

2273_3 [facility]



        01:16:00 05:59:00                         r       [place] [id number] 



                                                        [place] 1       [facility]

 

        2014 Outpatient         [facility] 2.16.840. 2.16.840.

1. [id number] 



        19:16:00 23:59:00                 , Vignesh [id number]        

 



                                        Caitlin  3.615.0.1 5.0.100         



                                                00                      

 

        2014 Outpatient                 nullFlavo       [id number]

72267 Mem[facility]



        19:16:00 19:16:00                         r       [place] 01      l



                                                                        [place]

 

        2014 Outpatient                 nullFlavo       [id number]

05941 [facility]



        19:16:00 19:16:00                         r       [place] 01      l



                                                                        [place]

 

        2014 Outpatient                 nullFlavo       [id number]

11220 [facility]



        19:43:00 19:43:00                         r       [place] 00      l



                                                                        [place]

 

        2014 [facility]       [id number]

03928 [facility]



        19:43:00 19:43:00                         r       [place] 00      l



                                                                        [place]







Results







           Test Description Test Time  Test Comments Results    Result     [facility]

e



                                                       Comments   

 

           PET/CT, CARDIAC 2022 Reason for ************************       

     



           PERF REST AND 16:05:00   exam:->angina ************************      

      



           STRESS                           [facility]            



                                            SU[facility] SEARS : 1955            



                                            Sex:                  



                                            F***********************            



                                            ************************            



                                            *************FINAL            



                                            REPORT PATIENT ID:            



                                            [id number] PROCEDURE:            



                                            MYOCARDIAL PERFUSION            



                                            PET/CT IMAGING            



                                            (Rest/Stress)CPT CODE:            



                                            96726 INDICATION:            



                                            Evaluation for chest            



                                            pain CARDIOVASCULAR            



                                            PROFILE:CAD History:            



                                            NoneSymptoms: Chest            



                                            painRisk Factors: Atrial            



                                            fibrillation, ESRDBMI:            



                                            34 STRESS             



                                            PROTOCOL:Pharmacologic            



                                            stress was achieved with            



                                            a 10-second intravenous            



                                            infusion of regadenoson            



                                            0.4 mg. The            



                                            radiopharmaceutical was            



                                            administered 30 seconds            



                                            after the start of the            



                                            regadenoson infusion.            



                                            IMAGING PROTOCOL:Limited            



                                            low-dose CT imaging was            



                                            performed for            



                                            attenuation correction.            



                                            39.9 mCi of Rb-82            



                                            chloride was injected            



                                            intravenously at rest,            



                                            and gated PET images            



                                            were obtained. Then,            



                                            39.9 mCi of Rb-82            



                                            chloride was injected            



                                            intravenously at peak            



                                            stress, and gated PET            



                                            images were obtained.            



                                            Image quality is good.            



                                            REST FINDINGS:HR:            



                                            79/minBP: 123/65            



                                            mmHgPrelim. EKG: Atrial            



                                            fibrillation with            



                                            incomplete RBBB and            



                                            nonspecific ST            



                                            changes.Perfusion:            



                                            Normal.Wall Motion:            



                                            Normal (LVEF >70%).LV            



                                            Volume: Normal. STRESS            



                                            FINDINGS:HR: 96/min (62%            



                                            of MPHR)BP: 116/70            



                                            mmHgPrelim. EKG: No            



                                            ischemic              



                                            changes.Symptoms:            



                                            Dyspnea (treatment not            



                                            required).Perfusion:            



                                            Normal.Wall Motion:            



                                            Normal (LVEF >70%).LV            



                                            Volume: Not            



                                            significantly changed            



                                            from rest. IMPRESSION:1.            



                                            Normal study.2. Normal            



                                            myocardial perfusion.3.            



                                            Normal resting LVEF,            



                                            which does not            



                                            deteriorate with            



                                            pharmacologic stress.4.            



                                            Normal extracardiac            



                                            tracer distribution.5.            



                                            There is no prior study            



                                            for comparison. Signed:            



                                            Reggie Gudino [facility]            



                                            Verified Date/Time:            



                                            2022 16:05:42            



                                            Electronically signed            



                                            by: REGGIE GUDINO MD on            



                                            2022 04:05 PM            









                    BASIC METABOLIC PANEL 2022 06:01:00 









                      Test Item  Value      Reference Range Interpretation Comme

nts









             SODIUM (BEAKER) (test 140 meq/L    136-145                   



             code = 381)                                         

 

             POTASSIUM (BEAKER) 4.8 meq/L    3.5-5.1                   Specimen 

slightly hemolyzed



             (test code = 379)                                        

 

             CHLORIDE (BEAKER) (test 107 meq/L                        



             code = 382)                                         

 

             CO2 (BEAKER) (test code 21 meq/L     22-29        L            



             = 355)                                              

 

             BLOOD UREA NITROGEN 22 mg/dL     7-21         H            



             (BEAKER) (test code =                                        



             354)                                                

 

             CREATININE (BEAKER) 5.15 mg/dL   0.57-1.25    H            Specimen

 slightly hemolyzed



             (test code = 358)                                        

 

             GLUCOSE RANDOM (BEAKER) 97 mg/dL                         



             (test code = 652)                                        

 

             CALCIUM (BEAKER) (test 9.1 mg/dL    8.4-10.2                  



             code = 697)                                         

 

             EGFR (BEAKER) (test 9 mL/min/1.73 sq m                            I

nterpretation of eGFR values



             code = 1092)                                        Stage Descripti

on  Result G1



                                                                 Normal or high 

>=90 G2 Mildly



                                                                 decreased 60-89

 G3a Mildly to



                                                                 moderately 45-5

9 G3b Moderately



                                                                 to severely 30-

44 G4  Severly



                                                                 decreased 15-29

 G5 Kidney



                                                                 failure <15Repo

rted eGFR is



                                                                 based on the CK

D-EPI 



                                                                 equation that d

oes not use a



                                                                 race coefficien

tEstimated GFR is



                                                                 not as accurate

 as Creatinine



                                                                 Clearance in pr

edicting



                                                                 glomerular filt

ration rate.



                                                                 Estimated GFR i

s not applicable



                                                                 for dialysis pa

tients



 ID - MARCOSpecimen slightly [id number] 05:55:08





             Test Item    Value        Reference Range Interpretation Comments

 

             MAGNESIUM (BEAKER) 1.9 mg/dL    1.6-2.6                   Specimen 

slightly



             (test code = 627)                                        hemolyzed



 ID - [id number] 05:55:08





             Test Item    Value        Reference Range Interpretation Comments

 

             PHOSPHORUS (BEAKER) 3.4 mg/dL    2.3-4.7                   Specimen

 slightly



             (test code = 604)                                        hemolyzed



 ID - MARCOCBC W/PLT COUNT &amp; AUTO [id number] 04:43:14





             Test Item    Value        Reference Range Interpretation Comments

 

             WHITE BLOOD CELL COUNT 6.7 K/ L     3.5-10.5                  



             (BEAKER) (test code =                                        



             775)                                                

 

             RED BLOOD CELL COUNT 2.94 M/ L    3.93-5.22    L            



             (BEAKER) (test code =                                        



             761)                                                

 

             HEMOGLOBIN (BEAKER) 9.1 GM/DL    11.2-15.7    L            



             (test code = 410)                                        

 

             HEMATOCRIT (BEAKER) 30.4 %       34.1-44.9    L            



             (test code = 411)                                        

 

             MEAN CORPUSCULAR 103 fL       79-95        H            Discordant 

MCV



             VOLUME (BEAKER) (test                                        result

s compared to



             code = 753)                                         previous result

s;



                                                                 clinical correl

ation



                                                                 required.

 

             MEAN CORPUSCULAR 31.0 pg      25.6-32.2                 



             HEMOGLOBIN (BEAKER)                                        



             (test code = 751)                                        

 

             MEAN CORPUSCULAR 29.9 GM/DL   32.2-35.5    L            



             HEMOGLOBIN CONC                                        



             (BEAKER) (test code =                                        



             752)                                                

 

             RED CELL DISTRIBUTION 14.2 %       11.7-14.4                 



             WIDTH (BEAKER) (test                                        



             code = 412)                                         

 

             PLATELET COUNT 84 K/CU MM   150-450      L            



             (BEAKER) (test code =                                        



             756)                                                

 

             MEAN PLATELET VOLUME 10.3 fL      9.4-12.3                  



             (BEAKER) (test code =                                        



             754)                                                

 

             NUCLEATED RED BLOOD 0 /100 WBC   0-0                       



             CELLS (BEAKER) (test                                        



             code = 413)                                         

 

             NEUTROPHILS RELATIVE 75 %                                   



             PERCENT (BEAKER) (test                                        



             code = 429)                                         

 

             LYMPHOCYTES RELATIVE 16 %                                   



             PERCENT (BEAKER) (test                                        



             code = 430)                                         

 

             MONOCYTES RELATIVE 7 %                                    



             PERCENT (BEAKER) (test                                        



             code = 431)                                         

 

             EOSINOPHILS RELATIVE 2 %                                    



             PERCENT (BEAKER) (test                                        



             code = 432)                                         

 

             BASOPHILS RELATIVE 0 %                                    



             PERCENT (BEAKER) (test                                        



             code = 437)                                         

 

             NEUTROPHILS ABSOLUTE 5.05 K/ L    1.56-6.13                 



             COUNT (BEAKER) (test                                        



             code = 670)                                         

 

             LYMPHOCYTES ABSOLUTE 1.05 K/ L    1.18-3.74    L            



             COUNT (BEAKER) (test                                        



             code = 414)                                         

 

             MONOCYTES ABSOLUTE 0.44 K/ L    0.24-0.36    H            



             COUNT (BEAKER) (test                                        



             code = 415)                                         

 

             EOSINOPHILS ABSOLUTE 0.10 K/ L    0.04-0.36                 



             COUNT (BEAKER) (test                                        



             code = 416)                                         

 

             BASOPHILS ABSOLUTE 0.02 K/ L    0.01-0.08                 



             COUNT (BEAKER) (test                                        



             code = 417)                                         

 

             IMMATURE     0.60 %       0.00-1.00                 



             GRANULOCYTES-RELATIVE                                        



             PERCENT (BEAKER) (test                                        



             code = 2801)                                        



PROTEIN ELECTROPHORESIS, SERUM WITH REFLEX TO [id number] 16:42:33





             Test Item    Value        Reference Range Interpretation Comments

 

             ALBUMIN FRACTION 3.4 gm/dL    3.5-5.5      L            



             (BEAKER) (test code =                                        



             405)                                                

 

             ALPHA 1 FRACTION 0.4 gm/dL    0.2-0.4                   



             (BEAKER) (test code =                                        



             389)                                                

 

             ALPHA 2 FRACTION 0.7 gm/dL    0.4-1.0                   



             (BEAKER) (test code =                                        



             390)                                                

 

             BETA FRACTION 0.6 gm/dL    0.5-1.1                   



             (BEAKER) (test code =                                        



             392)                                                

 

             GAMMA GLOBULIN 0.8 gm/dL    0.7-1.6                   



             FRACTION (BEAKER)                                        



             (test code = 391)                                        

 

             INTERPRETATION-119 Albumin decreased. This                         

  



             (BEAKER) (test code = may indicate protein                         

  



             2615)        malnutrition or a                           



                          protein losing state.                           

 

             [id number] Meredith Reyes, MD                           



             (BEAKER) (test code = (electronic signature)                       

    



             2616)                                               

 

             PROTEIN TOTAL SERUM, 6.0 gm/dL    6.0-8.3                   



             SPEP (BEAKER) (test                                        



             code = 2660)                                        



Clinical  - SFOperator ID - CAITLYN BOperator ID - ADMPERIPHERAL BLOOD SMEAR
- PATHOLOGIST [id number] 14:33:15





             Test Item    Value        Reference Range Interpretation Comments

 

             PERIPHERAL SMR REVIEW Cell counts confirmed.                       

    



             (BEAKER) (test code = There is macrocytic                          

 



             2640)        anemia. Platelets are                           



                          decreased with no                           



                          significant platelet                           



                          clumps or satellitism                           



                          identified.                            

 

             [id number] Amelia Ken,                          

 



             (BEAKER) (test code = M.D.                                   



             2849)                                               



HEPATITIS B SURFACE [id number] 10:29:09





             Test Item    Value        Reference Range Interpretation Comments

 

             HEPATITIS B SURFACE ANTIGEN (2) Nonreactive  Nonreactive           

    



             (BEAKER) (test code = 2585)                                        



Specimen is considered negative for HBsAg.Transthoracic 2D echo w/ doppler 
(cw/pw/color)2022 08:20:07Ejection [facility]SLE[facility] (cw/pw/color)
2022 08:20:07Ejection FractionSLEH [facility] (cw/pw/color)2022 
08:20:07Ejection FractionSLE [facility]CHO [facility] [id number] 05:41:18





             Test Item    Value        Reference Range Interpretation Comments

 

             HEPATITIS C ANTIBODY (BEAKER) Nonreactive  Nonreactive             

  



             (test code = 367)                                        



 ID - EMMANUELBASIC METABOLIC [id number] 05:22:16





             Test Item    Value        Reference Range Interpretation Comments

 

             SODIUM (BEAKER) 142 meq/L    136-145                   



             (test code = 381)                                        

 

             POTASSIUM    4.5 meq/L    3.5-5.1                   



             (BEAKER) (test                                        



             code = 379)                                         

 

             CHLORIDE (BEAKER) 109 meq/L           H            



             (test code = 382)                                        

 

             CO2 (BEAKER) 21 meq/L     22-29        L            



             (test code = 355)                                        

 

             BLOOD UREA   37 mg/dL     7-21         H            



             NITROGEN (BEAKER)                                        



             (test code = 354)                                        

 

             CREATININE   8.14 mg/dL   0.57-1.25    H            



             (BEAKER) (test                                        



             code = 358)                                         

 

             GLUCOSE RANDOM 99 mg/dL                         



             (BEAKER) (test                                        



             code = 652)                                         

 

             CALCIUM (BEAKER) 9.0 mg/dL    8.4-10.2                  



             (test code = 697)                                        

 

             EGFR (BEAKER) 5                                       Interpretatio

n of eGFR



             (test code = mL/min/1.73                            values Stage De

scription



             1092)        sq m                                   Result G1 Jeanette

l or high



                                                                 >=90 G2 Mildly 

decreased



                                                                 60-89 G3a Mildl

y to



                                                                 moderately 45-5

9 G3b



                                                                 Moderately to s

everely



                                                                 30-44 G4 Severl

y decreased



                                                                 15-29  G5 Kidne

y failure



                                                                 <15Reported eGF

R is based



                                                                 on the CKD-EPI 





                                                                 equation that d

oes not use



                                                                 a race



                                                                 coefficientEsti

mated GFR



                                                                 is not as accur

ate as



                                                                 Creatinine Mable

kalpana in



                                                                 predicting glom

erular



                                                                 filtration rate

. Estimated



                                                                 GFR is not appl

icable for



                                                                 dialysis patien

ts



 ID - [id number] 05:21:47





             Test Item    Value        Reference Range Interpretation Comments

 

             MAGNESIUM (BEAKER) (test code = 2.0 mg/dL    1.6-2.6               

    



             627)                                                



 ID - [id number] 05:21:47





             Test Item    Value        Reference Range Interpretation Comments

 

             PHOSPHORUS (BEAKER) (test code = 4.8 mg/dL    2.3-4.7      H       

     



             604)                                                



 ID - EMMANUELCBC W/PLT COUNT &amp; AUTO [id number] 05:05:48





             Test Item    Value        Reference Range Interpretation Comments

 

             WHITE BLOOD CELL COUNT (BEAKER) 7.0 K/ L     3.5-10.5              

    



             (test code = 775)                                        

 

             RED BLOOD CELL COUNT (BEAKER) 3.00 M/ L    3.93-5.22    L          

  



             (test code = 761)                                        

 

             HEMOGLOBIN (BEAKER) (test code = 9.4 GM/DL    11.2-15.7    L       

     



             410)                                                

 

             HEMATOCRIT (BEAKER) (test code = 32.0 %       34.1-44.9    L       

     



             411)                                                

 

             MEAN CORPUSCULAR VOLUME (BEAKER) 107 fL       79-95        H       

     



             (test code = 753)                                        

 

             MEAN CORPUSCULAR HEMOGLOBIN 31.3 pg      25.6-32.2                 



             (BEAKER) (test code = 751)                                        

 

             MEAN CORPUSCULAR HEMOGLOBIN CONC 29.4 GM/DL   32.2-35.5    L       

     



             (BEAKER) (test code = 752)                                        

 

             RED CELL DISTRIBUTION WIDTH 14.4 %       11.7-14.4                 



             (BEAKER) (test code = 412)                                        

 

             PLATELET COUNT (BEAKER) (test code 84 K/CU MM   150-450      L     

       



             = 756)                                              

 

             MEAN PLATELET VOLUME (BEAKER) 10.2 fL      9.4-12.3                

  



             (test code = 754)                                        

 

             NUCLEATED RED BLOOD CELLS (BEAKER) 0 /100 WBC   0-0                

       



             (test code = 413)                                        

 

             NEUTROPHILS RELATIVE PERCENT 81 %                                  

 



             (BEAKER) (test code = 429)                                        

 

             LYMPHOCYTES RELATIVE PERCENT 12 %                                  

 



             (BEAKER) (test code = 430)                                        

 

             MONOCYTES RELATIVE PERCENT 5 %                                    



             (BEAKER) (test code = 431)                                        

 

             EOSINOPHILS RELATIVE PERCENT 1 %                                   

 



             (BEAKER) (test code = 432)                                        

 

             BASOPHILS RELATIVE PERCENT 0 %                                    



             (BEAKER) (test code = 437)                                        

 

             NEUTROPHILS ABSOLUTE COUNT 5.69 K/ L    1.56-6.13                 



             (BEAKER) (test code = 670)                                        

 

             LYMPHOCYTES ABSOLUTE COUNT 0.84 K/ L    1.18-3.74    L            



             (BEAKER) (test code = 414)                                        

 

             MONOCYTES ABSOLUTE COUNT (BEAKER) 0.35 K/ L    0.24-0.36           

      



             (test code = 415)                                        

 

             EOSINOPHILS ABSOLUTE COUNT 0.08 K/ L    0.04-0.36                 



             (BEAKER) (test code = 416)                                        

 

             BASOPHILS ABSOLUTE COUNT (BEAKER) 0.02 K/ L    0.01-0.08           

      



             (test code = 417)                                        

 

             IMMATURE GRANULOCYTES-RELATIVE 0.60 %       0.00-1.00              

   



             PERCENT (BEAKER) (test code =                                      

  



             2801)                                               



BASIC METABOLIC [id number] 05:39:02





             Test Item    Value        Reference Range Interpretation Comments

 

             SODIUM (BEAKER) 141 meq/L    136-145                   



             (test code = 381)                                        

 

             POTASSIUM    5.0 meq/L    3.5-5.1                   



             (BEAKER) (test                                        



             code = 379)                                         

 

             CHLORIDE (BEAKER) 108 meq/L           H            



             (test code = 382)                                        

 

             CO2 (BEAKER) 22 meq/L     22-29                     



             (test code = 355)                                        

 

             BLOOD UREA   25 mg/dL     7-21         H            



             NITROGEN (BEAKER)                                        



             (test code = 354)                                        

 

             CREATININE   6.82 mg/dL   0.57-1.25    H            



             (BEAKER) (test                                        



             code = 358)                                         

 

             GLUCOSE RANDOM 102 mg/dL                        



             (BEAKER) (test                                        



             code = 652)                                         

 

             CALCIUM (BEAKER) 9.3 mg/dL    8.4-10.2                  



             (test code = 697)                                        

 

             EGFR (BEAKER) 6                                       Interpretatio

n of eGFR



             (test code = mL/min/1.73                            values Stage De

scription



             1092)        sq m                                   Result G1 Jeanette

l or high



                                                                 >=90 G2 Mildly 

decreased



                                                                 60-89 G3a Mildl

y to



                                                                 moderately 45-5

9 G3b



                                                                 Moderately to s

everely



                                                                 30-44 G4 Severl

y decreased



                                                                 15-29 G5 Kidney

 failure



                                                                 <15Reported eGF

R is based



                                                                 on the CKD-EPI 





                                                                 equation that d

oes not use



                                                                 a race



                                                                 coefficientEsti

mated GFR



                                                                 is not as accur

ate as



                                                                 Creatinine Mable

kalpana in



                                                                 predicting glom

erular



                                                                 filtration rate

. Estimated



                                                                 GFR is not appl

icable for



                                                                 dialysis patien

ts



 ID Corinna VILLARREAL [id number] 05:34:03





             Test Item    Value        Reference Range Interpretation Comments

 

             MAGNESIUM (BEAKER) (test code = 1.9 mg/dL    1.6-2.6               

    



             627)                                                



 ID Corinna VILLARREAL [id number] 05:34:03





             Test Item    Value        Reference Range Interpretation Comments

 

             PHOSPHORUS (BEAKER) (test code = 5.9 mg/dL    2.3-4.7      H       

     



             604)                                                



 ID Corinna VILLARREAL WCBC W/PLT COUNT &amp; AUTO [id number] 04:51:11





             Test Item    Value        Reference Range Interpretation Comments

 

             WHITE BLOOD CELL COUNT (BEAKER) 7.5 K/ L     3.5-10.5              

    



             (test code = 775)                                        

 

             RED BLOOD CELL COUNT (BEAKER) 2.88 M/ L    3.93-5.22    L          

  



             (test code = 761)                                        

 

             HEMOGLOBIN (BEAKER) (test code = 9.2 GM/DL    11.2-15.7    L       

     



             410)                                                

 

             HEMATOCRIT (BEAKER) (test code = 30.6 %       34.1-44.9    L       

     



             411)                                                

 

             MEAN CORPUSCULAR VOLUME (BEAKER) 106 fL       79-95        H       

     



             (test code = 753)                                        

 

             MEAN CORPUSCULAR HEMOGLOBIN 31.9 pg      25.6-32.2                 



             (BEAKER) (test code = 751)                                        

 

             MEAN CORPUSCULAR HEMOGLOBIN CONC 30.1 GM/DL   32.2-35.5    L       

     



             (BEAKER) (test code = 752)                                        

 

             RED CELL DISTRIBUTION WIDTH 14.3 %       11.7-14.4                 



             (BEAKER) (test code = 412)                                        

 

             PLATELET COUNT (BEAKER) (test code 87 K/CU MM   150-450      L     

       



             = 756)                                              

 

             MEAN PLATELET VOLUME (BEAKER) 10.4 fL      9.4-12.3                

  



             (test code = 754)                                        

 

             NUCLEATED RED BLOOD CELLS (BEAKER) 0 /100 WBC   0-0                

       



             (test code = 413)                                        

 

             NEUTROPHILS RELATIVE PERCENT 77 %                                  

 



             (BEAKER) (test code = 429)                                        

 

             LYMPHOCYTES RELATIVE PERCENT 15 %                                  

 



             (BEAKER) (test code = 430)                                        

 

             MONOCYTES RELATIVE PERCENT 5 %                                    



             (BEAKER) (test code = 431)                                        

 

             EOSINOPHILS RELATIVE PERCENT 1 %                                   

 



             (BEAKER) (test code = 432)                                        

 

             BASOPHILS RELATIVE PERCENT 0 %                                    



             (BEAKER) (test code = 437)                                        

 

             NEUTROPHILS ABSOLUTE COUNT 5.82 K/ L    1.56-6.13                 



             (BEAKER) (test code = 670)                                        

 

             LYMPHOCYTES ABSOLUTE COUNT 1.13 K/ L    1.18-3.74    L            



             (BEAKER) (test code = 414)                                        

 

             MONOCYTES ABSOLUTE COUNT (BEAKER) 0.39 K/ L    0.24-0.36    H      

      



             (test code = 415)                                        

 

             EOSINOPHILS ABSOLUTE COUNT 0.10 K/ L    0.04-0.36                 



             (BEAKER) (test code = 416)                                        

 

             BASOPHILS ABSOLUTE COUNT (BEAKER) 0.03 K/ L    0.01-0.08           

      



             (test code = 417)                                        

 

             IMMATURE GRANULOCYTES-RELATIVE 0.70 %       0.00-1.00              

   



             PERCENT (BEAKER) (test code =                                      

  



             2801)                                               



HIGH SENSITIVITY TROPONIN -36-35 18:47:20





             Test Item    Value        Reference Range Interpretation Comments

 

             HIGH SENSITIVITY 22 pg/ml     See_Comment  H             [Automated

 message]



             TROPONIN I (test code =                                        The 

system which



             1113036)                                            generated this 

result



                                                                 transmitted ref

erence



                                                                 range: <=17. Th

e



                                                                 reference range

 was



                                                                 not used to int

erpret



                                                                 this result as



                                                                 normal/abnormal

.



 ID - CAITLYN BThe  STAT High Sensitivity Troponin-I results 
should be used in conjunction with other diagnostic information such as ECG, 
clinical observations and information, and patient symptoms to aid in the 
diagnosis of MI.[id number] 18:35:03





             Test Item    Value        Reference Range Interpretation Comments

 

             POTASSIUM (BEAKER) (test code = 3.1 meq/L    3.5-5.1      L        

    



             379)                                                



 ID - CAITLYN BABAK, CHEST, 1 VIEW, NON [id number] 16:19:00Reason for 
exam:-&gt;pulm edemaShould this be performed at the bedside?-&gt;Yes
************************************************************[facility] : 1955 Sex: 
F************************************************************FINAL REPORT 
PATIENT ID: [id number] RAD, CHEST, 1 VIEW, NON DEPT INDICATION: pulm edema 
COMPARISON: None FINDINGS: Portable frontal view of the chest. IMPRESSION: 
Support Lines: Dialysis catheter tip overlies the right atrium. Lungs and 
pleura: Lungs are hypoinflated but otherwise unremarkable. No significant 
pulmonary edema or pleural effusions. No significant pneumothorax. Heart and 
mediastinum: Normal contours. Additional findings: None. Signed: Olga Greer
MDReport Verified Date/Time: 2022 16:19:33 Electronically signed by: OLGA GREER MD on 2022 04:19 [id number] 16:00:35





             Test Item    Value        Reference Range Interpretation Comments

 

             THYROID STIMULATING HORMONE 1.121 uIU/mL 0.350-4.940               



             (BIANKA) (test code = 772)                                        



 ID - CAITLYN BHIGH SENSITIVITY TROPONIN -00-21 15:45:53





             Test Item    Value        Reference Range Interpretation Comments

 

             HIGH SENSITIVITY 23 pg/ml     See_Comment  H             [Automated

 message]



             TROPONIN I (test code =                                        The 

system which



             0150651)                                            generated this 

result



                                                                 transmitted ref

erence



                                                                 range: <=17. Th

e



                                                                 reference range

 was



                                                                 not used to int

erpret



                                                                 this result as



                                                                 normal/abnormal

.



 ID - CAITLYN BThe  STAT High Sensitivity Troponin-I results 
should be used in conjunction with other diagnostic information such as ECG, 
clinical observations and information, and patient symptoms to aid in the 
diagnosis of MI.B-TYPE NATRIURETIC FACTOR (BNP)2022 15:45:53





             Test Item    Value        Reference Range Interpretation Comments

 

             B-TYPE NATRIURETIC PEPTIDE (BEAKER) 395 pg/mL    0-100        H    

        



             (test code = 700)                                        



 ID - CAITLYN BCOMPREHENSIVE METABOLIC [id number] 15:42:17





             Test Item    Value        Reference Range Interpretation Comments

 

             TOTAL PROTEIN 6.2 gm/dL    6.0-8.3                   



             (BEAKER) (test                                        



             code = 770)                                         

 

             ALBUMIN (BEAKER) 3.5 g/dL     3.5-5.0                   



             (test code = 1145)                                        

 

             ALKALINE     109 U/L                          



             PHOSPHATASE                                         



             (BEAKER) (test                                        



             code = 346)                                         

 

             BILIRUBIN TOTAL 1.2 mg/dL    0.2-1.2                   



             (BEAKER) (test                                        



             code = 377)                                         

 

             SODIUM (BEAKER) 141 meq/L    136-145                   



             (test code = 381)                                        

 

             POTASSIUM (BEAKER) 3.1 meq/L    3.5-5.1      L            



             (test code = 379)                                        

 

             CHLORIDE (BEAKER) 105 meq/L                        



             (test code = 382)                                        

 

             CO2 (BEAKER) (test 25 meq/L     22-29                     



             code = 355)                                         

 

             BLOOD UREA   19 mg/dL     7-21                      



             NITROGEN (BEAKER)                                        



             (test code = 354)                                        

 

             CREATININE   5.92 mg/dL   0.57-1.25    H            



             (BEAKER) (test                                        



             code = 358)                                         

 

             GLUCOSE RANDOM 109 mg/dL           H            



             (BEAKER) (test                                        



             code = 652)                                         

 

             CALCIUM (BEAKER) 9.0 mg/dL    8.4-10.2                  



             (test code = 697)                                        

 

             AST (SGOT)   11 U/L       5-34                      



             (BEAKER) (test                                        



             code = 353)                                         

 

             ALT (SGPT)   11 U/L       6-55                      



             (BEAKER) (test                                        



             code = 347)                                         

 

             EGFR (BEAKER) 7                                       Interpretatio

n of eGFR



             (test code = 1092) mL/min/1.73                            values St

age Description



                          sq m                                   Result G1 Jeanette

l or high



                                                                 >=90 G2 Mildly 

decreased



                                                                 60-89 G3a Mildl

y to



                                                                 moderately 45-5

9 G3b



                                                                 Moderately to s

everely



                                                                 30-44 G4 Severl

y decreased



                                                                 15-29 G5 Kidney

 failure



                                                                 <15Reported eGF

R is based



                                                                 on the CKD-EPI 





                                                                 equation that d

oes not use



                                                                 a race



                                                                 coefficientEsti

mated GFR



                                                                 is not as accur

ate as



                                                                 Creatinine Mable acuna in



                                                                 predicting glom

erular



                                                                 filtration rate

. Estimated



                                                                 GFR is not appl

icable for



                                                                 dialysis patien

ts



 ID - CAITLYN [id number] 15:39:33





             Test Item    Value        Reference Range Interpretation Comments

 

             MAGNESIUM (BEAKER) (test code = 1.9 mg/dL    1.6-2.6               

    



             627)                                                



 ID - CAITLYN [id number] 15:39:33





             Test Item    Value        Reference Range Interpretation Comments

 

             PHOSPHORUS (BEAKER) (test code = 5.0 mg/dL    2.3-4.7      H       

     



             604)                                                



 ID - CAITLYN BCBC W/PLT COUNT &amp; AUTO [id number] 15:23:08





             Test Item    Value        Reference Range Interpretation Comments

 

             WHITE BLOOD CELL COUNT (BEAKER) 5.6 K/ L     3.5-10.5              

    



             (test code = 775)                                        

 

             RED BLOOD CELL COUNT (BEAKER) 2.85 M/ L    3.93-5.22    L          

  



             (test code = 761)                                        

 

             HEMOGLOBIN (BEAKER) (test code = 8.9 GM/DL    11.2-15.7    L       

     



             410)                                                

 

             HEMATOCRIT (BEAKER) (test code = 29.9 %       34.1-44.9    L       

     



             411)                                                

 

             MEAN CORPUSCULAR VOLUME (BEAKER) 105 fL       79-95        H       

     



             (test code = 753)                                        

 

             MEAN CORPUSCULAR HEMOGLOBIN 31.2 pg      25.6-32.2                 



             (BEAKER) (test code = 751)                                        

 

             MEAN CORPUSCULAR HEMOGLOBIN CONC 29.8 GM/DL   32.2-35.5    L       

     



             (BEAKER) (test code = 752)                                        

 

             RED CELL DISTRIBUTION WIDTH 14.0 %       11.7-14.4                 



             (BEAKER) (test code = 412)                                        

 

             PLATELET COUNT (BEAKER) (test code 67 K/CU MM   150-450      L     

       



             = 756)                                              

 

             MEAN PLATELET VOLUME (BEAKER) 9.6 fL       9.4-12.3                

  



             (test code = 754)                                        

 

             NUCLEATED RED BLOOD CELLS (BEAKER) 0 /100 WBC   0-0                

       



             (test code = 413)                                        

 

             NEUTROPHILS RELATIVE PERCENT 76 %                                  

 



             (BEAKER) (test code = 429)                                        

 

             LYMPHOCYTES RELATIVE PERCENT 15 %                                  

 



             (BEAKER) (test code = 430)                                        

 

             MONOCYTES RELATIVE PERCENT 7 %                                    



             (BEAKER) (test code = 431)                                        

 

             EOSINOPHILS RELATIVE PERCENT 1 %                                   

 



             (BEAKER) (test code = 432)                                        

 

             BASOPHILS RELATIVE PERCENT 0 %                                    



             (BEAKER) (test code = 437)                                        

 

             NEUTROPHILS ABSOLUTE COUNT 4.27 K/ L    1.56-6.13                 



             (BEAKER) (test code = 670)                                        

 

             LYMPHOCYTES ABSOLUTE COUNT 0.86 K/ L    1.18-3.74    L            



             (BEAKER) (test code = 414)                                        

 

             MONOCYTES ABSOLUTE COUNT (BEAKER) 0.37 K/ L    0.24-0.36    H      

      



             (test code = 415)                                        

 

             EOSINOPHILS ABSOLUTE COUNT 0.05 K/ L    0.04-0.36                 



             (BEAKER) (test code = 416)                                        

 

             BASOPHILS ABSOLUTE COUNT (BEAKER) 0.02 K/ L    0.01-0.08           

      



             (test code = 417)                                        

 

             IMMATURE GRANULOCYTES-RELATIVE 0.50 %       0.00-1.00              

   



             PERCENT (BEAKER) (test code =                                      

  



             2801)                                               



ECG 12 [id number] 22:49:39





             Test Item    Value        Reference Range Interpretation Comments

 

             Ventricular rate (test                                        



             code = 253)                                         

 

             QRSD interval (test                                        



             code = 260)                                         

 

             QT interval (test code                                        



             = 264)                                              

 

             QTC interval (test code                                        



             = 265)                                              

 

             QRS axis 1 (test code =                                        



             268)                                                

 

             T wave axis (test code                                        



             = 270)                                              

 

             EKG impression (test Atrial                                 



             code = 273)  fibrillation-Rightward                           



                          axis-ST & T wave                           



                          abnormality, consider                           



                          inferior                               



                          ischemia-Abnormal                           



                          ECG-In automated                           



                          comparison with ECG of                           



                          2022 02:20,-QRS                           



                          axis shifted                           



                          right-Electronically                           



                          Signed By Moris WIGGINS,                           



                          Neftali YING (2008) on                           



                          2022 4:49:37 PM                           



Janeen ChandARS-CoV-2 (COVID-19) RNA [Presence] in Respiratory specimen 
by EDWARD with probe [id number] 04:29:38





             Test Item    Value        Reference Range Interpretation Comments

 

             SARS-CoV-2 (COVID-19) RNA Not detected                           



             [Presence] in Respiratory                                        



             specimen by EDWARD with probe                                        



             detection (test code = 20974-3)                                    

    

 

             Whether patient is employed in a Unknown                           

     



             healthcare setting (test code =                                    

    



             59929-5)                                            

 

             Whether the patient has symptoms Unknown                           

     



             related to condition of interest                                   

     



             (test code = 94130-6)                                        

 

             Whether the patient was Unknown                                



             hospitalized for condition of                                      

  



             interest (test code = 74722-8)                                     

   

 

             Whether the patient was admitted Unknown                           

     



             to intensive care unit (ICU) for                                   

     



             condition of interest (test code                                   

     



             = 85447-0)                                          

 

             Whether patient resides in a Unknown                               

 



             congregate care setting (test                                      

  



             code = 81169-7)                                        

 

             Pregnancy status (test code = Unknown                              

  



             33016-6)                                            

 

             Date and time of symptom onset Unknown                             

   



             (test code = 34474-8)                                        



[id number]





             Test Item    Value        Reference    Interpretation Comments



                                       Range                     

 

             PTH (test code = 146 pg/mL    16-77        H             Interpreti

ve Guide Intact



             2731-8)                                             PTH



                                                                 Calcium--------

----------



                                                                 ---------- ----

---Normal



                                                                 Parathyroid Nor

mal



                                                                 NormalHypoparat

hyroidism



                                                                 Low or Low Norm

al



                                                                 LowHyperparathy

roidism



                                                                 Primary Normal 

or High



                                                                 High Secondary 

High Normal



                                                                 or Low Tertiary

 High



                                                                 HighNon-Parathy

roid



                                                                 Hypercalcemia L

ow or Low



                                                                 Normal High

 

             SUGEY (test code = FASTING:NO                             



             SUGEY)         FASTING: NO                            

 

             RAC (test code = Performing                             



             RAC)         Organization                           



                          Information:                           



                          Site ID: A                           



                          Name: [facility]                               



                          Address: [address] [place] [address]                             



                          Director:                              



                          Vignesh Felix                           

 

             Lab          Abnormal                               



             Interpretation                                        



             (test code =                                        



             87765-0)                                            



[facility] [id number] 17:49:00





             Test Item    Value        Reference Range Interpretation Comments

 

             TSH (test                 See_Comment                [Automated mes

zia]



             code =                                              The system [facility]

h



             3016-3)                                             generated this



                                                                 result transmit

kolby



                                                                 reference range

:



                                                                 0.40 - 4.50 mIU

/L.



                                                                 The reference r

[place]



                                                                 was not used to



                                                                 interpret this



                                                                 result as



                                                                 normal/abnormal

.

 

             SUGEY (test    FASTING:NO FASTING:                           



             code = SUGEY)  NO                                     

 

             RAC (test    Performing                             



             code = RAC)  Organization                           



                          Information: Site ID:                           



                          [facility]                           



                          Lab Address: [address] [place], [place] [id number] Director:                           



                          Vignesh Felix                           



[facility] hydroxy [id number] 17:49:00





             Test Item    Value        Reference Range Interpretation Comments

 

             Vitamin D,   82 ng/mL                         Vitamin D Statu

s



             25-hydroxy (test                                        25-OH Vitam

in D:



             code = -3)                                        Deficiency: <

20



                                                                 ng/mLInsufficie

ncy



                                                                 : 20 - 29



                                                                 ng/mLOptimal: >

 or



                                                                 = 30 ng/mL For



                                                                 25-OH Vitamin D



                                                                 testing on



                                                                 patients on



                                                                 D2-supplementat

ion



                                                                 and patients fo

r



                                                                 whom quantitati

on



                                                                 of D2 and D3



                                                                 fractions is



                                                                 required, the



                                                                 [employer]D(TM

)25



                                                                 -OH VIT D,



                                                                 (D2,D3), LC/MS/

MS



                                                                 is recommended:



                                                                 order code 9288

8



                                                                 (patients



                                                                 >2yrs).See Note

 1



                                                                 Note 1 For



                                                                 additional



                                                                 information,



                                                                 please refer to



                                                                 http://educatio

n.Q



                                                                 [facility]

.co



                                                                 m/faq/OUP502 (T

his



                                                                 link is being



                                                                 provided for



                                                                 informational/e

radu



                                                                 ational purpose

s



                                                                 only.)

 

             SUGEY (test code = FASTING:NO FASTING:                           



             SUGEY)         NO                                     

 

             RAC (test code = Performing                             



             RAC)         Organization                           



                          Information: Site                           



                          ID: RGA [facility]                           



                          Lab Address: [address] [place] [address]                            



                          [id number] Director:                           



                          Vignesh Felix                           



[facility] [id number] 04:51:00





             Test Item    Value        Reference Range Interpretation Comments

 

             POC glucose (test code = 129 mg/dL    65-99        H            Ope

rator Name:



             74321-0)                                            Juliana Rose~Daniela

ce



                                                                 ID:



                                                                 [id number]~Luzmaria

table



                                                                 : HMW Notified 

RN

 

             Lab Interpretation (test Abnormal                               



             code = 02453-5)                                        



[facility], (w Contrast, Strain and
3D if needed)2022 14:28:07





             Test Item    Value        Reference    Interpretation Comments



                                       Range                     

 

             RA pressure (test              mmHg                      



             code = 6587898044)                                        

 

             EF (test code = 50 %         54-74        A            



             [id number])                                         

 

             IVS,d (test code = 0.92 cm      0.6-0.9      A            



             4015247734)                                         

 

             IVS s 2D (test code 1.08 cm                                



             = 0929925664)                                        

 

             LVPWD,d (test code = 0.93 cm      0.60-1.19                 



             8712539158)                                         

 

             LVPW s PLAX (test 1.13 cm                                



             code = 4269771680)                                        

 

             LV,s (test code = 2.94 cm                                



             6893208346)                                         

 

             LVOT Diam,S (test 1.98 cm                                



             code = 0032647365)                                        

 

             LV PEREZ VOL (test 66.04 ml                         



             code = 3787733009)                                        

 

             LV SYS VOL (test 33.32 ml     14-42                     



             code = 9466455302)                                        

 

             MV Peak E Adama (test 1.61 m/s                               



             code = 9749087780)                                        

 

             MV Peak A Adama (test 0.58 m/s                               



             code = 7318211677)                                        

 

             E/A ratio (test code              See_Comment  A             [Autom

ated



             = 4317902886)                                        message] The



                                                                 system which



                                                                 generated this



                                                                 result



                                                                 transmitted



                                                                 reference range

:



                                                                 <=0.8. The



                                                                 reference range



                                                                 was not used to



                                                                 interpret this



                                                                 result as



                                                                 normal/abnormal

.

 

             E wave decelartion              See_Comment  A             [Automat

ed



             time (test code =                                        message] T

he



             [id number])                                         system which



                                                                 generated this



                                                                 result



                                                                 transmitted



                                                                 reference range

:



                                                                 200 msec. The



                                                                 reference range



                                                                 was not used to



                                                                 interpret this



                                                                 result as



                                                                 normal/abnormal

.

 

             LV,d (test code = 3.90 cm                                



             [id number])                                         

 

             IVS/LVPW,2D (test                                        



             code = 5422329478)                                        

 

             LV EF,2D (test code 57.26 %                                



             = 6910428869)                                        

 

             LV FS Cube 2D (test                                        



             code = 9408409652)                                        

 

             LV FS Teich 2D (test                                        



             code = 8679756008)                                        

 

             LV SV Teich 2D (test 32.73 ml                               



             code = 9840809880)                                        

 

             LV Vol s Teich PSAX 33.32 ml                               



             (test code =                                        



             6576688103)                                         

 

             LVOT stroke volume 0.46 cm3                               



             (test code =                                        



             2680331403)                                         

 

             Left Atrium  5.30 cm      See_Comment  A             [Automated



             Dimension Anterior                                        message] 

The



             (test code =                                        system which



             1616594701)                                         generated this



                                                                 result



                                                                 transmitted



                                                                 reference range

:



                                                                 <=3.8. The



                                                                 reference range



                                                                 was not used to



                                                                 interpret this



                                                                 result as



                                                                 normal/abnormal

.

 

             LA Vol MOD A4C (test 130.09 ml                              



             code = 8882568732)                                        

 

             LA area s A4C (test 36.52 cm2                              



             code = 3324321043)                                        

 

             LVOT area (test code 3.08 cm2                               



             = 1619941493)                                        

 

             LVOT Vmax (test code 0.85 m/s                               



             = 1953835623)                                        

 

             AoV Mean PG (test              See_Comment                [Automate

d



             code = 8531900398)                                        message] 

The



                                                                 system which



                                                                 generated this



                                                                 result



                                                                 transmitted



                                                                 reference range

:



                                                                 20 mmHg. The



                                                                 reference range



                                                                 was not used to



                                                                 interpret this



                                                                 result as



                                                                 normal/abnormal

.

 

             AoV Peak PG (test              mmHg                      



             code = 6655865227)                                        

 

             AV LVOT peak              mmHg                      



             gradient (test code                                        



             = 8729015990)                                        

 

             AoV Area, Vmax (test 1.94 cm2     See_Comment                [Autom

ated



             code = 5995210992)                                        message] 

The



                                                                 system which



                                                                 generated this



                                                                 result



                                                                 transmitted



                                                                 reference range

:



                                                                 >=1.5. The



                                                                 reference range



                                                                 was not used to



                                                                 interpret this



                                                                 result as



                                                                 normal/abnormal

.

 

             LVOT VTI (CM) (test 15.00 cm                               



             code = 9203030024)                                        

 

             AoV Vmax (test code 1.35 m/s                               



             = 0851485773)                                        

 

             AoV Vmn (test code = 0.82 m/s                               



             4182498914)                                         

 

             AoV Area, VTI (test 2.30 cm2                               



             code = 9324338998)                                        

 

             LVOT CO (test code = 4.72 l/min                             



             3788126650)                                         

 

             LVOT HR for LVOT CO              bpm                       



             (test code =                                        



             6993335819)                                         

 

             Velocity Ratio 0.63 m/s                               



             (V1/V2) (test code =                                        



             4689)                                               

 

             MV mean gradient              See_Comment                [Automated



             (test code =                                        message] The



             0599456756)                                         system which



                                                                 generated this



                                                                 result



                                                                 transmitted



                                                                 reference range

:



                                                                 5 mmHg. The



                                                                 reference range



                                                                 was not used to



                                                                 interpret this



                                                                 result as



                                                                 normal/abnormal

.

 

             MR peak grad (test              mmHg                      



             code = 4683048826)                                        

 

             MV stenosis pressure 30.12 ms     See_Comment                [Autom

ated



             1/2 time (test code                                        message]

 The



             = 1469992144)                                        system which



                                                                 generated this



                                                                 result



                                                                 transmitted



                                                                 reference range

:



                                                                 <=150. The



                                                                 reference range



                                                                 was not used to



                                                                 interpret this



                                                                 result as



                                                                 normal/abnormal

.

 

             MV E A ratio (test                                        



             code = 6740418503)                                        

 

             MV valve area p 1/2 7.30 cm2                               



             method (test code =                                        



             1518693328)                                         

 

             MV VTI Tips (test 0.15 m                                 



             code = 0214644000)                                        

 

             MV Vmax (test code = 0.72 m                                 



             9425785575)                                         

 

             RVSP (test code =              See_Comment  A             [Automate

d



             7613737880)                                         message] The



                                                                 system which



                                                                 generated this



                                                                 result



                                                                 transmitted



                                                                 reference range

:



                                                                 40.00 mmHg. The



                                                                 reference range



                                                                 was not used to



                                                                 interpret this



                                                                 result as



                                                                 normal/abnormal

.

 

             TR pk grad (test              mmHg                      



             code = 0635154353)                                        

 

             TR Vpeak (test code 2.85 m/s                               



             = 2101962554)                                        

 

             RVSP (TR) (test code              mmHg                      



             = 3281836513)                                        

 

             RVOT Vmax (test code 0.44 m/s                               



             = 9237285884)                                        

 

             PV Mean Grad (test              mmHg                      



             code = 9174026022)                                        

 

             PV Pk Grad (test              See_Comment                [Automated



             code = 4124939907)                                        message] 

The



                                                                 system which



                                                                 generated this



                                                                 result



                                                                 transmitted



                                                                 reference range

:



                                                                 36 mmHg. The



                                                                 reference range



                                                                 was not used to



                                                                 interpret this



                                                                 result as



                                                                 normal/abnormal

.

 

             PV VTI (test code = 0.17 m                                 



             8249972638)                                         

 

             RVOT pk grad (test              mmHg                      



             code = 3515647523)                                        

 

             PV VMAX (test code = 1.05 m/s     See_Comment                [Autom

ated



             8472389191)                                         message] The



                                                                 system which



                                                                 generated this



                                                                 result



                                                                 transmitted



                                                                 reference range

:



                                                                 <=3. The



                                                                 reference range



                                                                 was not used to



                                                                 interpret this



                                                                 result as



                                                                 normal/abnormal

.

 

             PV Vmn (test code =                                        



             2800174824)                                         

 

             Ao Root Diameter 2.95 cm      See_Comment                [Automated



             (test code =                                        message] The



             4586702209)                                         system which



                                                                 generated this



                                                                 result



                                                                 transmitted



                                                                 reference range

:



                                                                 <=3.99. The



                                                                 reference range



                                                                 was not used to



                                                                 interpret this



                                                                 result as



                                                                 normal/abnormal

.

 

             Ao Root Diameter 2.95 cm                                



             (test code =                                        



             9249590374)                                         

 

             Ascending aorta 2.58 cm                                



             (test code =                                        



             6415939909)                                         

 

             Pred METS R1 (test                                        



             code = 5716403862)                                        

 

             Pred Exer Dur R1                                        



             (test code =                                        



             9050398488)                                         

 

             MV Decel slope (test 15.54 m/s2                             



             code = 7029503084)                                        

 

             LVPW pct thck PLAX 20.67 %                                



             (test code =                                        



             5559420696)                                         

 

             LV vol s cube 2D 25.42 ml                               



             (test code =                                        



             1605922215)                                         

 

             LV vol d cube 2D 59.47 ml                               



             (test code =                                        



             7279838126)                                         

 

             LV SV Cube 2D (test 34.05 ml                               



             code = 6454274304)                                        

 

             IVS pct thck PLAX 17.04 %                                



             (test code =                                        



             6220021033)                                         

 

             Calc MPHR (test code              bpm                       



             = 0892903445)                                        

 

             85 of MPHR (test                                        



             code = 7450189038)                                        

 

             RVOT VTI (test code 0.08 m                                 



             = 0946860652)                                        

 

             RVOT Vmn (test code 0.30 m/s                               



             = 2439523218)                                        

 

             RVOT mean grad (test              mmHg                      



             code = 9098542112)                                        

 

             MAX Pred HR (test                                        



             code = 8198755404)                                        

 

             LVOT mean grad (test              mmHg                      



             code = 1207429450)                                        

 

             Aov area Vmn (test 2.31 cm2                               



             code = 3602520042)                                        

 

             MV AE ratio (test                                        



             code = 3903841711)                                        

 

             LVOT Vmn (test code                                        



             = 5985670045)                                        

 

             MR Vmax (test code = 4.65 m/s                               



             3889181735)                                         

 

             AoV VTI (test code = 0.20 m                                 



             9640407264)                                         

 

             LVOT VTI (test code 0.15 m                                 



             = 0484755487)                                        

 

                          SUGEY (test code =          

                                                           



             SUGEY)          Left Ventricle:                           



                          Normal systolic                           



                          function with a                           



                          visually estimated                           



                          EF of 55 - 60%.                           



                          Grade III                              



                          (restrictive)                           



                          diastolic                              



                                                    dysfunction.

                                                           



                           Right Ventricle:                           



                          Right ventricle is                           



                          moderately dilated.                           



                          Mildly reduced                           



                                                    systolic function.

                                                           



                           Tricuspid Valve:                           



                          Moderate valvular                           



                          regurgitation. Left                           



                          VentricleLeft                           



                          ventricle size is                           



                          normal. Normal                           



                          systolic function                           



                          with a visually                           



                          estimated EF of 55 -                           



                          60%. Grade III                           



                          (restrictive)                           



                          diastolic                              



                          dysfunction.Right                           



                          VentricleRight                           



                          ventricle is                           



                          moderately dilated.                           



                          Mildly reduced                           



                          systolic                               



                          function.Left                           



                          AtriumLeft atrium is                           



                          severely                               



                          dilated.Right                           



                          AtriumRight atrium                           



                          is severely                            



                          dilated.Mitral                           



                          ValveMild mitral                           



                          annular                                



                          calcification. Mild                           



                          valvular                               



                          regurgitation.Tricus                           



                          pid ValveValve                           



                          structure is normal.                           



                          Moderate valvular                           



                          regurgitation.Aortic                           



                          ValveValve structure                           



                          is normal. No                           



                          significant valvular                           



                          regurgitation.Pulmon                           



                          ic ValveValve                           



                          structure is normal.                           



                          Mild valvular                           



                          regurgitation.Perica                           



                          rdiumThere is no                           



                          pericardial effusion                           



                          present.Study                           



                          DetailsStudy quality                           



                          was adequate. A                           



                          complete 2D, color                           



                          flow Doppler and                           



                          spectral Doppler                           



                          echocardiogram was                           



                          performed.The                           



                          apical, parasternal,                           



                          subcostal and                           



                          suprasternal views                           



                          were obtained.                           



                          Technical                              



                          difficulties due to                           



                          lung artifact.                           

 

             Lab Interpretation Abnormal                               



             (test code =                                        



             20885-0)                                            



[facility]8 (COVID-19) RNA [Presence] in Respiratory specimen 
by EDWARD with probe [id number] 19:34:22





             Test Item    Value        Reference Range Interpretation Comments

 

             SARS-CoV-2 (COVID-19) RNA [Presence] Detected                      

         



             in Respiratory specimen by EDWARD with                                

        



             probe detection (test code =                                       

 



             06448-5)                                            

 

             Whether patient is employed in a Unknown                           

     



             healthcare setting (test code =                                    

    



             06760-0)                                            

 

             Whether the patient has symptoms Unknown                           

     



             related to condition of interest                                   

     



             (test code = 44004-7)                                        

 

             Whether the patient was hospitalized Unknown                       

         



             for condition of interest (test code                               

         



             = 74739-0)                                          

 

             Whether the patient was admitted to Unknown                        

        



             intensive care unit (ICU) for                                      

  



             condition of interest (test code =                                 

       



             35303-4)                                            

 

             Whether patient resides in a Unknown                               

 



             congregate care setting (test code =                               

         



             50808-3)                                            

 

             Pregnancy status (test code = Unknown                              

  



             93335-7)                                            

 

             Date and time of symptom onset (test Unknown                       

         



             code = 05830-0)                                        



[facility] METABOLIC [id number] 16:39:00





             Test Item    Value        Reference Range Interpretation Comments

 

             SODIUM (test code = NA) 138 mEq/L    134-147      N            

 

             POTASSIUM (test code = 3.7 mEq/L    3.4-5.0      N            



             K)                                                  

 

             CHLORIDE (test code = 100 mEq/L    100-108      N            



             CL)                                                 

 

             CARBON DIOXIDE (test 28 mEq/l     21-33        N            



             code = CO2)                                         

 

             ANION GAP (test code = 13           0-20         N            



             GAP)                                                

 

             GLUCOSE (test code = 77 mg/dL            N            



             GLU)                                                

 

             BLOOD UREA NITROGEN 13 mg/dL     7-18         N            



             (test code = BUN)                                        

 

             GLOMERULAR FILTRATION 11.6         80-90        L            Units 

of measure =



             RATE (test code = GFR)                                        ml/mi

n/1.73 m2

 

             CREATININE (test code = 3.9 mg/dL    0.6-1.3      H            



             CREAT)                                              

 

             CALCIUM (test code = 8.8 mg/dL    8.0-10.5     N            



             CA)                                                 



PROTHROMBIN [id number] 16:33:00





             Test Item    Value        Reference Range Interpretation Comments

 

             PROTHROMBIN TIME 13.1 SECONDS 9.3-12.9     H            



             PATIENT (test code =                                        



             PTP)                                                

 

             INTERNATIONAL NORMAL 1.2          0.8-1.2      N              TARGE

T INR BY



             RATIO (test code =                                        INDICATIO

N Indication



             INR)                                                INR1. Prophylax

is of



                                                                 venous thrombos

is 2.0



                                                                 - 3.0 (orthoped

ic



                                                                 surgery), Proph

ylaxis



                                                                 of venous throm

bosis



                                                                 (other than hig

h-risk



                                                                 surgery), Treat

ment of



                                                                 Deep Vein



                                                                 Thrombosis/Pulm

onary



                                                                 Embolism, Preve

ntion



                                                                 of systemic emb

olism -



                                                                 Tissue heart va

lves,



                                                                 Acute Myocardia

l



                                                                 Infarction (to 

prevent



                                                                  systemic embol

ism),



                                                                 Valvular heart



                                                                 disease, Atrial



                                                                 Fibrillation,



                                                                 Bileaflet mecha

nical



                                                                 valve in aortic



                                                                 position.2. Mec

hanical



                                                                 prosthetic valv

es



                                                                 (high risk), 2.

5 - 3.5



                                                                 Presence of Lup

us



                                                                 Anticoagulant o

r



                                                                 Antiphospholipi

d



                                                                 Antibodies, Pre

vention



                                                                 of systemic emb

olism -



                                                                 Acute Myocardia

l



                                                                 Infarction (to 

prevent



                                                                 recurrent infar

ct).



CBC W/AUTO [id number] 16:23:00





             Test Item    Value        Reference Range Interpretation Comments

 

             WHITE BLOOD CELL (test code = 9.2 x10 3/uL 4.5-11.0     N          

  



             WBC)                                                

 

             RED BLOOD CELL (test code = 3.17 x10 6/uL 3.54-5.02    L           

 



             RBC)                                                

 

             HEMOGLOBIN (test code = HGB) 10.3 g/dL    11.0-15.0    L           

 

 

             HEMATOCRIT (test code = HCT) 33.2 %       33.0-45.0    N           

 

 

             MEAN CELL VOLUME (test code = 104.7 fL     81.0-99.0    H          

  



             MCV)                                                

 

             MEAN CELL HGB (test code = MCH) 32.5 pg      27.0-33.0    N        

    

 

             MEAN CELL HGB CONCETRATION 31.0 g/dL    33.0-37.0    L            



             (test code = MCHC)                                        

 

             RED CELL DISTRIBUTION WIDTH CV 14.5 %       11.5-14.5    N         

   



             (test code = RDW)                                        

 

             RED CELL DISTRIBUTION WIDTH SD 56.6 fL      37.0-54.0    H         

   



             (test code = RDW-SD)                                        

 

             PLATELET COUNT (test code = 108 x10 3/uL 150-400      L            



             PLT)                                                

 

             MEAN PLATELET VOLUME (test code 10.2 fL      7.0-9.0      H        

    



             = MPV)                                              

 

             NEUTROPHIL % (test code = NT%) 80.2 %       56.0-77.0    H         

   

 

             IMMATURE GRANULOCYTE % (test 1.4 %        0.0-2.0      N           

 



             code = IG%)                                         

 

             LYMPHOCYTE % (test code = LY%) 11.0 %       14.0-32.0    L         

   

 

             MONOCYTE % (test code = MO%) 5.2 %        4.8-9.0      N           

 

 

             EOSINOPHIL % (test code = EO%) 1.5 %        0.3-3.7      N         

   

 

             BASOPHIL % (test code = BA%) 0.7 %        0.0-2.0      N           

 

 

             NUCLEATED RBC % (test code = 0.0 %        0-0          N           

 



             NRBC%)                                              

 

             NEUTROPHIL # (test code = NT#) 7.34 x10 3/uL 2.0-7.6      N        

    

 

             IMMATURE GRANULOCYTE # (test 0.13 x10 3/uL 0.00-0.03    H          

  



             code = IG#)                                         

 

             LYMPHOCYTE # (test code = LY#) 1.01 x10 3/uL 1.0-3.8      N        

    

 

             MONOCYTE # (test code = MO#) 0.48 x10 3/uL 0.1-0.8      N          

  

 

             EOSINOPHIL # (test code = EO#) 0.14 x10 3/uL 0.0-0.2      N        

    

 

             BASOPHIL # (test code = BA#) 0.06 x10 3/uL 0.0-0.2      N          

  

 

             NUCLEATED RBC # (test code = 0.00 x10 3/uL 0.0-0.1      N          

  



             NRBC#)                                              

 

             MANUAL DIFF REQUIRED (test code NO                                 

    



             = MDIFF)                                            



Hepatitis B surface [id number] 21:36:32





             Test Item    Value        Reference Range Interpretation Comments

 

             Hep B S Ab (test code <8.0         See_Comment                [Auto

mated



             = 90183-8)                                          message] The



                                                                 system which



                                                                 generated this



                                                                 result transmit

kolby



                                                                 reference range

:



                                                                 <8.0 mIU/mL. Th

e



                                                                 reference range



                                                                 was not used to



                                                                 interpret this



                                                                 result as



                                                                 normal/abnormal

.

 

             SUGEY (test code = SUGEY)  ID -                           



                          DB                                     

 

             Lab Interpretation Normal                                 



             (test code = 01637-0)                                        



[facility] B surface [id number] 21:36:32





             Test Item    Value        Reference Range Interpretation Comments

 

             Hep B S Ab (test code <8.0         See_Comment                [Auto

mated



             = 18631-8)                                          message] The



                                                                 system which



                                                                 generated this



                                                                 result transmit

kolby



                                                                 reference range

:



                                                                 <8.0 mIU/mL. Th

e



                                                                 reference range



                                                                 was not used to



                                                                 interpret this



                                                                 result as



                                                                 normal/abnormal

.

 

             SUGEY (test code = SUGEY)  ID -                           



                          DB                                     

 

             Lab Interpretation Normal                                 



             (test code = 99515-6)                                        



[id number]36:32





             Test Item    Value        Reference Range Interpretation Comments

 

             Hep B S Ab (test code <8.0         See_Comment                [Auto

mated



             = 25337-5)                                          message] The



                                                                 system which



                                                                 generated this



                                                                 result transmit

kolby



                                                                 reference range

:



                                                                 <8.0 mIU/mL. Th

e



                                                                 reference range



                                                                 was not used to



                                                                 interpret this



                                                                 result as



                                                                 normal/abnormal

.

 

             SUGEY (test code = SUGEY)  ID -                           



                          DB                                     

 

             Lab Interpretation Normal                                 



             (test code = 82328-3)                                        



[facility] [id number] 21[facility]36:32





             Test Item    Value        Reference Range Interpretation Comments

 

             Hep B S Ab (test code <8.0         See_Comment                [Auto

mated



             = 30182-3)                                          message] The



                                                                 system which



                                                                 generated this



                                                                 result transmit

kolby



                                                                 reference range

:



                                                                 <8.0 mIU/mL. Th

e



                                                                 reference range



                                                                 was not used to



                                                                 interpret this



                                                                 result as



                                                                 normal/abnormal

.

 

             SUGEY (test code = SUGEY)  ID -                           



                          DB                                     

 

             Lab Interpretation Normal                                 



             (test code = 99163-4)                                        



[facility] [id number] 21:36:32





             Test Item    Value        Reference Range Interpretation Comments

 

             Hep B S Ab (test code <8.0         See_Comment                [Auto

mated



             = 71339-7)                                          message] The



                                                                 system which



                                                                 generated this



                                                                 result transmit

kolby



                                                                 reference range

:



                                                                 <8.0 mIU/mL. Th

e



                                                                 reference range



                                                                 was not used to



                                                                 interpret this



                                                                 result as



                                                                 normal/abnormal

.

 

             SUGEY (test code = SUGEY)  ID -                           



                          DB                                     

 

             Lab Interpretation Normal                                 



             (test code = 59584-9)                                        



[facility] [id number] 21:36:32





             Test Item    Value        Reference Range Interpretation Comments

 

             HEPATITIS B SURFACE ANTIBODY < mIU/mL     <8.0                     

 



             (BEAKER) (test code = 647)                                        



 ID - DBHepatitis B surface [id number] 21:24:22





             Test Item    Value        Reference Range Interpretation Comments

 

             Hepatitis B surface Nonreactive  Nonreactive               



             antigen (test code =                                        



             5195-3)                                             

 

             SUGEY (test code = SUGEY) Specimen is                            



                          considered negative                           



                          for HBsAg.                             

 

             Lab Interpretation (test Normal                                 



             code = 93482-8)                                        



[facility]"" B surface [id number] 21:24:22





             Test Item    Value        Reference Range Interpretation Comments

 

             Hepatitis B surface Nonreactive  Nonreactive               



             antigen (test code =                                        



             5195-3)                                             

 

             SUGEY (test code = SUGEY) Specimen is                            



                          considered negative                           



                          for HBsAg.                             

 

             Lab Interpretation (test Normal                                 



             code = 38288-2)                                        



[facility] B SURFACE [id number] 21:24:22





             Test Item    Value        Reference Range Interpretation Comments

 

             HEPATITIS B SURFACE ANTIGEN (2) Nonreactive  Nonreactive           

    



             (BEAKER) (test code = 2585)                                        



Specimen is considered negative for HBsAg.Transthoracic Echocardiogram Complete,
(w Contrast, Strain and 3D if needed)2022 13:23:07





             Test Item    Value        Reference    Interpretation Comments



                                       Range                     

 

             EF (test code = 66 %         54-74                     



             [id number])                                         

 

             IVS,d (test code = 1.03 cm      0.6-0.9      A            



             [id number])                                         

 

             LVPWD,d (test code = 1.27 cm      0.60-1.19    A            



             [id number])                                         

 

             LV,s (test code = 2.56 cm                                



             0394586218)                                         

 

             LVOT Diam,S (test 2.01 cm                                



             code = 7635402055)                                        

 

             LV PEREZ VOL (test 69.20 ml                         



             code = 4657091514)                                        

 

             LV SYS VOL (test 23.74 ml     14-42                     



             code = 1605973630)                                        

 

             MV Peak E Adama (test 1.20 m/s                               



             code = 3528396377)                                        

 

             MV Peak A Adama (test 0.47 m/s                               



             code = 9535146067)                                        

 

             E/A ratio (test code              See_Comment  A             [Autom

ated



             = 7835097452)                                        message] The



                                                                 system which



                                                                 generated this



                                                                 result



                                                                 transmitted



                                                                 reference range

:



                                                                 <=0.8. The



                                                                 reference range



                                                                 was not used to



                                                                 interpret this



                                                                 result as



                                                                 normal/abnormal

.

 

             E wave decelartion              See_Comment  A             [Automat

ed



             time (test code =                                        message] T

he



             [id number])                                         system which



                                                                 generated this



                                                                 result



                                                                 transmitted



                                                                 reference range

:



                                                                 200 msec. The



                                                                 reference range



                                                                 was not used to



                                                                 interpret this



                                                                 result as



                                                                 normal/abnormal

.

 

             LV,d (test code = 3.98 cm                                



             2156457902)                                         

 

             IVS/LVPW,2D (test                                        



             code = 8971524384)                                        

 

             LV EF,2D (test code 73.32 %                                



             = 8073590489)                                        

 

             LV FS Cube 2D (test                                        



             code = 6812865369)                                        

 

             LV FS Teich 2D (test                                        



             code = 5689441047)                                        

 

             LV SV Teich 2D (test 45.46 ml                               



             code = 9120726231)                                        

 

             LV Vol s Teich PSAX 23.74 ml                               



             (test code =                                        



             7960779601)                                         

 

             LVOT stroke volume 0.35 cm3                               



             (test code =                                        



             3108955114)                                         

 

             Left Atrium  4.52 cm      See_Comment  A             [Automated



             Dimension Anterior                                        message] 

The



             (test code =                                        system which



             9059943074)                                         generated this



                                                                 result



                                                                 transmitted



                                                                 reference range

:



                                                                 <=3.8. The



                                                                 reference range



                                                                 was not used to



                                                                 interpret this



                                                                 result as



                                                                 normal/abnormal

.

 

             LA Vol MOD A4C (test 83.62 ml                               



             code = 9208544614)                                        

 

             LA area s A4C (test 28.59 cm2                              



             code = 5413011751)                                        

 

             LA Ao Ratio Mmode                                        



             (test code =                                        



             0070610468)                                         

 

             LVOT area (test code 3.17 cm2                               



             = 4275113766)                                        

 

             LVOT Vmax (test code 0.62 m/s                               



             = 7028582158)                                        

 

             AoV Mean PG (test              See_Comment                [Automate

d



             code = 5485511852)                                        message] 

The



                                                                 system which



                                                                 generated this



                                                                 result



                                                                 transmitted



                                                                 reference range

:



                                                                 20 mmHg. The



                                                                 reference range



                                                                 was not used to



                                                                 interpret this



                                                                 result as



                                                                 normal/abnormal

.

 

             AoV Peak PG (test              mmHg                      



             code = 0933843920)                                        

 

             AV LVOT peak              mmHg                      



             gradient (test code                                        



             = 0814755071)                                        

 

             AoV Area, Vmax (test 2.00 cm2     See_Comment                [Autom

ated



             code = 7278795695)                                        message] 

The



                                                                 system which



                                                                 generated this



                                                                 result



                                                                 transmitted



                                                                 reference range

:



                                                                 >=1.5. The



                                                                 reference range



                                                                 was not used to



                                                                 interpret this



                                                                 result as



                                                                 normal/abnormal

.

 

             LVOT VTI (CM) (test 11.00 cm                               



             code = 8616252496)                                        

 

             AoV Vmax (test code 0.98 m/s                               



             = 1604904962)                                        

 

             AoV Vmn (test code = 0.65 m/s                               



             7358053482)                                         

 

             AoV Area, VTI (test 2.42 cm2                               



             code = 8600897567)                                        

 

             Velocity Ratio 0.63 m/s                               



             (V1/V2) (test code =                                        



             4689)                                               

 

             MR peak grad (test              mmHg                      



             code = 3453219610)                                        

 

             MV stenosis pressure 31.00 ms     See_Comment                [Autom

ated



             1/2 time (test code                                        message]

 The



             = 2384952098)                                        system which



                                                                 generated this



                                                                 result



                                                                 transmitted



                                                                 reference range

:



                                                                 <=150. The



                                                                 reference range



                                                                 was not used to



                                                                 interpret this



                                                                 result as



                                                                 normal/abnormal

.

 

             MV E A ratio (test                                        



             code = 6110715632)                                        

 

             MV valve area p 1/2 7.10 cm2                               



             method (test code =                                        



             4415368659)                                         

 

             E prime lat (test                                        



             code = 5447565949)                                        

 

             E prine sept (test                                        



             code = 7050288148)                                        

 

             RVSP (test code =              See_Comment  A             [Automate

d



             8291142367)                                         message] The



                                                                 system which



                                                                 generated this



                                                                 result



                                                                 transmitted



                                                                 reference range

:



                                                                 40.00 mmHg. The



                                                                 reference range



                                                                 was not used to



                                                                 interpret this



                                                                 result as



                                                                 normal/abnormal

.

 

             RA pressure (test              mmHg                      



             code = 5366763045)                                        

 

             TR pk grad (test              mmHg                      



             code = 7076364333)                                        

 

             TR Vpeak (test code 3.51 m/s                               



             = 8676848074)                                        

 

             RVSP (TR) (test code              mmHg                      



             = 6082965311)                                        

 

             RVOT Vmax (test code 0.46 m/s                               



             = 4452102015)                                        

 

             PV Pk Grad (test              See_Comment                [Automated



             code = 6846183222)                                        message] 

The



                                                                 system which



                                                                 generated this



                                                                 result



                                                                 transmitted



                                                                 reference range

:



                                                                 36 mmHg. The



                                                                 reference range



                                                                 was not used to



                                                                 interpret this



                                                                 result as



                                                                 normal/abnormal

.

 

             RVOT pk grad (test              mmHg                      



             code = 4646736898)                                        

 

             PV VMAX (test code = 1.15 m/s     See_Comment                [Autom

ated



             3315077666)                                         message] The



                                                                 system which



                                                                 generated this



                                                                 result



                                                                 transmitted



                                                                 reference range

:



                                                                 <=3. The



                                                                 reference range



                                                                 was not used to



                                                                 interpret this



                                                                 result as



                                                                 normal/abnormal

.

 

             Ao root annulus 2.82 cm                                



             (test code =                                        



             5148766949)                                         

 

             Ao Root Diameter 3.23 cm      See_Comment                [Automated



             (test code =                                        message] The



             9555093532)                                         system which



                                                                 generated this



                                                                 result



                                                                 transmitted



                                                                 reference range

:



                                                                 <=3.99. The



                                                                 reference range



                                                                 was not used to



                                                                 interpret this



                                                                 result as



                                                                 normal/abnormal

.

 

             Ao Root Diameter 3.23 cm                                



             (test code =                                        



             1911369014)                                         

 

             Ascending aorta 3.69 cm                                



             (test code =                                        



             9845954943)                                         

 

             Pred METS R1 (test                                        



             code = 5503561947)                                        

 

             Pred Exer Dur R1                                        



             (test code =                                        



             6189993595)                                         

 

             MV Decel slope (test 11.26 m/s2                             



             code = 9247075500)                                        

 

             LV vol s cube 2D 16.83 ml                               



             (test code =                                        



             2742990457)                                         

 

             LV vol d cube 2D 63.08 ml                               



             (test code =                                        



             0838274467)                                         

 

             LV SV Cube 2D (test 46.25 ml                               



             code = 3595924277)                                        

 

             Calc MPHR (test code              bpm                       



             = 7718373493)                                        

 

             85 of MPHR (test                                        



             code = 6655188015)                                        

 

             MAX Pred HR (test                                        



             code = 3442670450)                                        

 

             LVOT mean grad (test              mmHg                      



             code = 3885579798)                                        

 

             Aov area Vmn (test 1.92 cm2                               



             code = 2271806785)                                        

 

             MV AE ratio (test                                        



             code = 2677435180)                                        

 

             LVOT Vmn (test code                                        



             = 2959848715)                                        

 

             MR Vmax (test code = 4.22 m/s                               



             5490638601)                                         

 

             AoV VTI (test code = 0.15 m                                 



             7688934642)                                         

 

             LVOT VTI (test code 0.11 m                                 



             = 0977217915)                                        

 

                          SUGEY (test code =          

                                                           



             SUGEY)          Left Ventricle:                           



                          Normal systolic                           



                          function with a                           



                          visually estimated                           



                          EF of 65 - 70%.                           



                          Grade I (impaired                           



                          relaxation)                            



                          diastolic                              



                                                    dysfunction.

                                                           



                           Left Atrium: Left                           



                          atrium is mildly                           



                                                    dilated.

                                                           



                           Right Ventricle:                           



                          Right ventricle is                           



                          moderately dilated.                           



                          Reduced systolic                           



                                                    function.

                                                           



                           Right Atrium: Right                           



                          atrium is moderately                           



                                                    dilated.

                                                           



                           Mitral Valve: Valve                           



                          structure is normal.                           



                          Mildly calcified                           



                          leaflets. Mild                           



                          valvular                               



                                                    regurgitation.

                                                           



                           Tricuspid Valve:                           



                          Moderate valvular                           



                          regurgitation.                           



                          Moderately elevated                           



                          pulmonary artery                           



                          systolic pressure.                           



                          RVSP is 59.82 mmHg.                           



                          Left VentricleLeft                           



                          ventricle size is                           



                          normal. Normal                           



                          systolic function                           



                          with a visually                           



                          estimated EF of 65 -                           



                          70%. Grade I                           



                          (impaired                              



                          relaxation)                            



                          diastolic                              



                          dysfunction.Right                           



                          VentricleRight                           



                          ventricle is                           



                          moderately dilated.                           



                          Reduced systolic                           



                          function.Left                           



                          AtriumLeft atrium is                           



                          mildly dilated.Right                           



                          AtriumRight atrium                           



                          is moderately                           



                          dilated.Mitral                           



                          ValveValve structure                           



                          is normal. Mildly                           



                          calcified leaflets.                           



                          Mild valvular                           



                          regurgitation.Tricus                           



                          pid ValveValve                           



                          structure is normal.                           



                          Moderate valvular                           



                          regurgitation.                           



                          Moderately elevated                           



                          pulmonary artery                           



                          systolic pressure.                           



                          RVSP is 59.82                           



                          mmHg.Aortic                            



                          ValveValve structure                           



                          appears tricuspid.                           



                          No significant                           



                          valvular                               



                          regurgitation. No                           



                          stenosis.Pulmonic                           



                          ValveValve structure                           



                          is                                     



                          normal.PericardiumTh                           



                          ere is no                              



                          pericardial effusion                           



                          present.Study                           



                          DetailsStudy quality                           



                          was good. A complete                           



                          2D, color flow                           



                          Doppler and spectral                           



                          Doppler                                



                          echocardiogram was                           



                          performed.The                           



                          apical, parasternal,                           



                          subcostal and                           



                          suprasternal views                           



                          were obtained.                           



                          Patient exhibited                           



                          sinus tachycardia.                           

 

             Lab Interpretation Abnormal                               



             (test code =                                        



             44598-8)                                            



[id number]





             Test Item    Value        Reference    Interpretation Comments



                                       Range                     

 

             Urine culture Proteus [id number]              A            Specime

n



             isolate (test cfu/mlThe                              InformationSpe

[facility]



             code = 80173-3) performance                            Source: Urin

eSpecimen



                          characteristics of                           Site: [facility]

an catch



                          this assay on this                           



                          isolatewere                            



                          validated by the                           



                          Microbiology                           



                          Laboratory at                           



                          [facility]. This                           



                          source has not been                           



                          approved by the                           



                          U.S. Food and Drug                           



                          Administration. The                           



                          results are not                           



                          intended to be used                           



                          as the sole means                           



                          for clinical                           



                          diagnosis or                           



                          patient management.                           



                          The Microbiology                           



                          Laboratory is                           



                          authorized under                           



                          the clinical                           



                          Laboratory                             



                          Improvement                            



                          Amendments of 1988                           



                          (CLIA-88) to                           



                          perform high                           



                          complexity testing.                           

 

             Lab          Abnormal                               



             Interpretation                                        



             (test code =                                        



             79395-3)                                            



[facility]Presence] in Respiratory specimen 
by EDWARD with probe [id number] 00:41:02





             Test Item    Value        Reference Range Interpretation Comments

 

             SARS-CoV-2 (COVID-19) RNA Not detected                           



             [Presence] in Respiratory                                        



             specimen by EDWARD with probe                                        



             detection (test code = 78942-3)                                    

    

 

             Whether patient is employed in a Unknown                           

     



             healthcare setting (test code =                                    

    



             49759-4)                                            

 

             Whether the patient has symptoms Unknown                           

     



             related to condition of interest                                   

     



             (test code = 26739-5)                                        

 

             Whether the patient was Unknown                                



             hospitalized for condition of                                      

  



             interest (test code = 91127-6)                                     

   

 

             Whether the patient was admitted Unknown                           

     



             to intensive care unit (ICU) for                                   

     



             condition of interest (test code                                   

     



             = 08342-7)                                          

 

             Whether patient resides in a Unknown                               

 



             congregate care setting (test                                      

  



             code = 61969-6)                                        

 

             Pregnancy status (test code = Unknown                              

  



             39008-7)                                            

 

             Date and time of symptom onset Unknown                             

   



             (test code = 75040-3)                                        



[facility]- XR [id number] 10:28:00
************************************************************[facility] : 1955 Sex: 
F************************************************************ Name: 
SUZI SEARS [facility] : 1955 Age/S: 66 / F [address] [facility] [address] 
Unit #: [id number] Loc: [place] [address] Phys: Suzie Dominguez MD Acct: 
[id number] Dis Date: Status: REG SDC PHONE #: [phone] Exam Date: 
2022  0950 FAX #: Reason: ERCP EXAMS: CPT: [id number] XR ERCP  52625 Fluoro
 Time: 33 SEC DAP (Gy m2): Air Kerma (mGy): EXAMINATION: - XR ERCP. LOCATION: 
S17.  HISTORY: ERCP, CBD obstruction. COMPARISON: None. FINDINGS/ IMPRESSION: 
Nine portable limited intraoperative fluoroscopic images of right upper quadrant
 during ERCP are submitted to radiology department. Initial image demonstrates 
CBD stent and postoperative clips in upper abdomen. Subsequent images 
demonstrate contrast opacification of CBD and small bowel. Please see operative 
report for further details. No radiologist was present during procedure. 
FLUOROSCOPIC TIME: 35.6 seconds. Reference air Kerma: 11.146 mGy. ** 
Electronically Signed by ISH Paz ** ** on 2022 at 1028 **
  Reported and signed by: Roverto Paz M.D. CC: Suzie Dominguez MD; 
Charisse Coyne MD  PAGE 1 Signed Report Name: SUZI SEARS  
: 1955 Age/S: 66 / F [address] [address] Unit #: [id number] Loc: 
[place] [address] Phys: Suzie Dominguez MD  Acct: [id number] Dis Date: Status: 
REG [facility] PHONE #: [phone] Exam Date: 2022 0927  FAX #: Reason: ERCP 
EXAMS: CPT: [id number] XR ERCP  40866 Fluoro Time: 33 SEC DAP (Gy m2): Air Kerma 
(mGy): (Continued) Technologist: Maribel Diaz, RT(R) Trnscb Date/Time: 
2022 (1028) [id number] Orig Print D/T: S: 2022 (1032) PAGE 2  Signed
 [id number] 08:51:00





             Test Item    Value        Reference Range Interpretation Comments

 

             POTASSIUM (test code = K) 5.1 mmol/L   3.4-5.0      H            



CBC W/AUTO [id number] 08:15:00





             Test Item    Value        Reference Range Interpretation Comments

 

             WHITE BLOOD CELL (test code = 9.2 K/mm3    3.5-11.0     N          

  



             WBC)                                                

 

             RED BLOOD CELL (test code = 3.76 M/mm3   4.70-6.10    L            



             RBC)                                                

 

             HEMOGLOBIN (test code = HGB) 11.8 G/DL    10.4-14.9    N           

 

 

             HEMATOCRIT (test code = HCT) 37.7 %       31.5-44.1    N           

 

 

             MEAN CELL VOLUME (test code = 100.3 Fl     84.5-98.6    H          

  



             MCV)                                                

 

             MEAN CELL HGB (test code = MCH) 31.4 pg      27.0-34.2    N        

    

 

             MEAN CELL HGB CONCETRATION 31.3 G/DL    31.5-34.0    L            



             (test code = MCHC)                                        

 

             RED CELL DISTRIBUTION WIDTH 13.2 SD      11.5-14.5    N            



             (test code = RDW)                                        

 

             PLATELET COUNT (test code = 136 K/mm3    150-450      L            



             PLT)                                                

 

             MEAN PLATELET VOLUME (test code 9.80 fL      7.0-10.5     N        

    



             = MPV)                                              

 

             NEUTROPHIL % (test code = NT%) 71.9 %       40-76        N         

   

 

             IMMATURE GRANULOCYTE % (test 0.9 %        0.0-5.0      N           

 



             code = IG%)                                         

 

             LYMPHOCYTE % (test code = LY%) 18.1 %       20.5-51.1    L         

   

 

             MONOCYTE % (test code = MO%) 6.8 %        1.7-9.3      N           

 

 

             EOSINOPHIL % (test code = EO%) 1.5 %        0.0-6.0      N         

   

 

             BASOPHIL % (test code = BA%) 0.8 %        0.0-2.0      N           

 

 

             NUCLEATED RBC % (test code = 0.0 /100WBC% 0.0-1.0      N           

 



             NRBC%)                                              

 

             NEUTROPHIL # (test code = NT#) 6.7 K/mm3    1.8-7.6      N         

   

 

             IMMATURE GRANULOCYTE # (test 0.08 x10 3/uL 0.00-0.03    H          

  



             code = IG#)                                         

 

             LYMPHOCYTE # (test code = LY#) 1.7 K/mm3    0.6-3.2      N         

   

 

             MONOCYTE # (test code = MO#) 0.6 K/mm3    0.3-1.1      N           

 

 

             EOSINOPHIL # (test code = EO#) 0.1 K/mm3    0.0-0.4      N         

   

 

             BASOPHIL # (test code = BA#) 0.1 K/mm3    0.0-0.1      N           

 

 

             NUCLEATED RBC # (test code = 0.0 K/mm3    0.0-0.1      N           

 



             NRBC#)                                              

 

             MANUAL DIFF REQUIRED (test code NO DIFF/SCN  CRITERIA              

    



             = MDIFF)                                            



BASIC METABOLIC [id number] 08:08:00





             Test Item    Value        Reference Range Interpretation Comments

 

             SODIUM (test code = NA) 135 mmol/L   134-147      N            

 

             POTASSIUM (test code = K) 5.9 mmol/L   3.4-5.0      HH           

 

             CHLORIDE (test code = CL) 103 mmol/L   100-108      N            

 

             CARBON DIOXIDE (test code = CO2) 27 mmol/L    21-32        N       

     

 

             ANION GAP (test code = GAP) 5.0 GAP calc 4.0-15.0     N            

 

             GLUCOSE (test code = GLU) 109 MG/DL           N            

 

             BLOOD UREA NITROGEN (test code = 43 MG/DL     7-18         H       

     



             BUN)                                                

 

             GLOMERULAR FILTRATION RATE (test 4 estGFR     >60          L       

     



             code = GFR)                                         

 

             CREATININE (test code = CREAT) 10.5 MG/DL   0.6-1.0      H         

   

 

             CALCIUM (test code = CA) 9.9 MG/DL    8.5-10.1     N            



COVID 19 INHOUSE [id number] 14:11:00





             Test Item    Value        Reference Range Interpretation Comments

 

             COVID 19 INHOUSE AG NEGATIVE     Negative                  Per manu

facturer,



             (test code =                                        negative result

s should



             [id number])                                        be treated aspr

esumptive



                                                                 and, if inconsi

stent with



                                                                 clinical signs



                                                                 andsymptoms or 

necessary



                                                                 for patient man

agement,



                                                                 should betested

 with an



                                                                 alternative mol

ecular



                                                                 assay. Negative

 resultsdo



                                                                 not preclude SA

RS-CoV-2



                                                                 infection and s

hould not



                                                                 be usedas the s

ole basis



                                                                 for patient man

agement



                                                                 decisions. Nega

tive



                                                                 results should 

be



                                                                 considered in t

he context



                                                                 of apatient's r

ecent



                                                                 exposures, hist

ory,



                                                                 presence of cli

nicalsigns



                                                                 and symptoms co

nsistent



                                                                 with COVID-19.



CBC W/AUTO [id number] 14:00:00





             Test Item    Value        Reference Range Interpretation Comments

 

             WHITE BLOOD CELL 7.2 K/mm3    3.5-11.0     N            



             (test code = WBC)                                        

 

             RED BLOOD CELL (test 4.01 M/mm3   4.70-6.10    L            



             code = RBC)                                         

 

             HEMOGLOBIN (test code 12.6 G/DL    10.4-14.9    N            



             = HGB)                                              

 

             HEMATOCRIT (test code 39.7 %       31.5-44.1    N            



             = HCT)                                              

 

             MEAN CELL VOLUME 99.0 Fl      84.5-98.6    H            



             (test code = MCV)                                        

 

             MEAN CELL HGB (test 31.4 pg      27.0-34.2    N            



             code = MCH)                                         

 

             MEAN CELL HGB 31.7 G/DL    31.5-34.0    N            



             CONCETRATION (test                                        



             code = MCHC)                                        

 

             RED CELL DISTRIBUTION 13.3 SD      11.5-14.5    N            



             WIDTH (test code =                                        



             RDW)                                                

 

             PLATELET COUNT (test 106 K/mm3    150-450      L            



             code = PLT)                                         

 

             MEAN PLATELET VOLUME 10.20 fL     7.0-10.5     N            



             (test code = MPV)                                        

 

             NEUTROPHIL % (test 72.0 %       40-76        N            



             code = NT%)                                         

 

             IMMATURE GRANULOCYTE 0.7 %        0.0-5.0      N            



             % (test code = IG%)                                        

 

             LYMPHOCYTE % (test 17.8 %       20.5-51.1    L            



             code = LY%)                                         

 

             MONOCYTE % (test code 7.4 %        1.7-9.3      N            



             = MO%)                                              

 

             EOSINOPHIL % (test 1.4 %        0.0-6.0      N            



             code = EO%)                                         

 

             BASOPHIL % (test code 0.7 %        0.0-2.0      N            



             = BA%)                                              

 

             NUCLEATED RBC % (test 0.0 /100WBC% 0.0-1.0      N            



             code = NRBC%)                                        

 

             NEUTROPHIL # (test 5.2 K/mm3    1.8-7.6      N            



             code = NT#)                                         

 

             IMMATURE GRANULOCYTE 0.05 x10 3/uL 0.00-0.03    H            



             # (test code = IG#)                                        

 

             LYMPHOCYTE # (test 1.3 K/mm3    0.6-3.2      N            



             code = LY#)                                         

 

             MONOCYTE # (test code 0.5 K/mm3    0.3-1.1      N            



             = MO#)                                              

 

             EOSINOPHIL # (test 0.1 K/mm3    0.0-0.4      N            



             code = EO#)                                         

 

             BASOPHIL # (test code 0.1 K/mm3    0.0-0.1      N            



             = BA#)                                              

 

             NUCLEATED RBC # (test 0.0 K/mm3    0.0-0.1      N            



             code = NRBC#)                                        

 

             MANUAL DIFF REQUIRED NO DIFF/SCN  CRITERIA                  SLIDE R

SERGIOW



             (test code = MDIFF)                                        CONSISTA

NT WITH



                                                                 AUTO DIFFERENTI

AL.



BASIC METABOLIC [id number] 13:32:00





             Test Item    Value        Reference Range Interpretation Comments

 

             SODIUM (test code = NA) 136 mmol/L   134-147      N            

 

             POTASSIUM (test code = K) 5.1 mmol/L   3.4-5.0      H            

 

             CHLORIDE (test code = CL) 103 mmol/L   100-108      N            

 

             CARBON DIOXIDE (test code = CO2) 26 mmol/L    21-32        N       

     

 

             ANION GAP (test code = GAP) 7.0 GAP calc 4.0-15.0     N            

 

             GLUCOSE (test code = GLU) 103 MG/DL           N            

 

             BLOOD UREA NITROGEN (test code = 37 MG/DL     7-18         H       

     



             BUN)                                                

 

             GLOMERULAR FILTRATION RATE (test 6 estGFR     >60          L       

     



             code = GFR)                                         

 

             CREATININE (test code = CREAT) 7.4 MG/DL    0.6-1.0      H         

   

 

             CALCIUM (test code = CA) 10.1 MG/DL   8.5-10.1     N            



HEPATIC FUNCTION [id number] 13:32:00





             Test Item    Value        Reference Range Interpretation Comments

 

             TOTAL PROTEIN (test code = PROT) 7.7 G/DL     6.4-8.2      N       

     

 

             ALBUMIN (test code = ALB) 3.4 G/DL     3.4-5.0      N            

 

             BILIRUBIN TOTAL (test code = BILT) 2.10 MG/DL   0.2-1.2      H     

       

 

             BILIRUBIN DIRECT (test code = 0.50 MG/DL   0.00-0.30    H          

  



             BILD)                                               

 

             BILIRUBIN INDIRECT (test code = 1.60 MG/DL   0.2-1.2      H        

    



             BILIND)                                             

 

             SGOT/AST (test code = AST) 17 Unit/L    15-37        N            

 

             SGPT/ALT (test code = ALT) 27 Unit/L    12-78        N            

 

             ALKALINE PHOSPHATASE TOTAL (test 222 Unit/L          H       

     



             code = ALKP)                                        



[id number] 13:32:00





             Test Item    Value        Reference Range Interpretation Comments

 

             LIPASE (test code = LIP) 109 Unit/L   114-286      L            



[id number] 18:01:00





             Test Item    Value        Reference Range Interpretation Comments

 

             SURGICAL (test --------------------------------                    

       



             code = SR)   --------------------------------                      

     



                          ----------------------------RUN                       

    



                          DATE: 22 KHANH Rivero                           



                          [place] [facility] PAGE 1 RUN TIME:                       

    



                          180 Specimen Inquiry  RUN USER:                      

     



                          INTERFACE                              



                          --------------------------------                      

     



                          --------------------------------                      

     



                          ----------------------------KYLE                      

     



                          ENT: SUZI SEARS ACCT #:                          

 



                          [id number] LOC: L.END  #:                          

 



                          [id number] AGE/SX: 66/F ROOM:                         

  



                          RE22REG DR:                           



                          Clark Cuellar MD  :                           



                          57 BED: DIS: STATUS: [facility] TLOC:                              



                          --------------------------------                      

     



                          --------------------------------                      

     



                          ----------------------------                          

 



                          SPEC #: 22:PMC: RECD:                           



                           STATUS: VERONICA LEYVA                        

   



                          #: [id number] SOFIA:                        

    



                          [facility] DR: Clark Cuellar MD                          

 



                          ENTERED:  SP TYPE:                       

    



                          SURGICAL OTHR DR:                           



                          Charisse Coyne MD ORDERED:                         

  



                          87152/2, 94724, 14921, 23183,                         

  



                          ANATOMIC SPEC, SPECIMEN TRACK                         

  



                          COPIES TO: Charisse Coyne MD                       

    



                          [address] [place] [address] [phone]                           



                          [place] [address]                         

  



                          [phone] PROCEDURES: [id number]                        

   



                          (22-) 88136                           



                          (22-) 64665                           



                          () 06485                           



                          () SPECIMEN TRACK                        

   



                          () TISSUES: A.                           



                          GASTRIC MUCOSA - BX ANTRUM AND                        

   



                          BODY B. ESOPHAGUS BIOPSY -                           



                          DISTAI ESOPHAGUS BX FINAL                           



                          DIAGNOSIS A. Stomach, antrum and                      

     



                          body, endoscopic biopsy:-                           



                          Healing/reparative/chemical                           



                          gastropathy changes, minimal to                       

    



                          mild- Negative for intestinal                         

  



                          metaplasia- Negative for                           



                          Helicobacter pylori                           



                          (Immunohistochemistry stain) B.                       

    



                          Esophagus, distal, endoscopic                         

  



                          biopsy:- Reflux esophagitis,                          

 



                          mild- Negative for glandular                          

 



                          epithelium/intestinal                           



                          metaplasia/dysplasia (Alcian                          

 



                          Blue stain)- Negative for fungal                      

     



                          organisms (PAS stain) Comment:                        

   



                          Suggest clinical correlation.                         

  



                          Note: For each marker stain                           



                          tested above: Positive control                        

   



                          is positive and                           



                          Negative(external or internal)                        

   



                          control is negative (staining                         

  



                          performed at [facility]).                       

    



                          ** CONTINUED ON NEXT PAGE **                          

 



                          --------------------------------                      

     



                          --------------------------------                      

     



                          ----------------------------RUN                       

    



                          DATE: 22 [facility] PAGE 2 RUN TIME:                       

    



                          1801 Specimen Inquiry RUN USER:                       

    



                          INTERFACE                              



                          --------------------------------                      

     



                          --------------------------------                      

     



                          ----------------------------SPEC                      

     



                          #: 22:PMC: PATIENT:                           



                          SUZI SEARS #[id number]                         

  



                          (Continued)---------------------                      

     



                          --------------------------------                      

     



                          --------------------------------                      

     



                          ------- GROSS DESCRIPTION A.                          

 



                          Antrum and body biopsy. It                           



                          consists of 4 tissue fragments                        

   



                          measuring 2-5 mm. All as A1. B.                       

    



                          Distal esophageal biopsy. It                          

 



                          consists of 2 tissue fragments                        

   



                          measuring 3 mm each. Allas B1.                        

   



                          Technical component performed at                      

     



                          [address],                        

   



                          [place] [address] Unless gross                         

  



                          only, the diagnosis is based                          

 



                          upon microscopic                           



                          examination.Immunohistochemistry                      

     



                          : This test was developed and                         

  



                          its performancecharacteristics                        

   



                          determined by this laboratory.                        

   



                          It has not been approved nordoes                      

     



                          it need approval by the [place] FDA.                       

    



                          Appropriate positive and                           



                          negative controlsare reviewed                         

  



                          and judged to be acceptable.                          

 



                          This laboratory is certified                          

 



                          underthe Clinical Laboratory                          

 



                          Improvement Amendments (CLIA-88)                      

     



                          as qualified toperform high                           



                          complexity clinical laboratory                        

   



                          testing. MICROSCOPIC DESCRIPTION                      

     



                          Findings are incorporated into                        

   



                          the diagnosis                           



                          section.------------------------                      

     



                          --------------------------------                      

     



                          --------------------------------                      

     



                          ---- Signed SIGNATURE ON FILE                         

  



                          PoKye 22 1801                         

  



                          --------------------------------                      

     



                          --------------------------------                      

     



                          ---------------------------- **                       

    



                          END OF REPORT **                           



BASIC METABOLIC [id number] 08:13:00





             Test Item    Value        Reference Range Interpretation Comments

 

             SODIUM (test code = NA) 137 mmol/L   134-147      N            

 

             POTASSIUM (test code = K) 4.2 mmol/L   3.4-5.0      N            

 

             CHLORIDE (test code = CL) 105 mmol/L   100-108      N            

 

             CARBON DIOXIDE (test code = CO2) 23 mmol/L    21-32        N       

     

 

             ANION GAP (test code = GAP) 9.0 GAP calc 4.0-15.0     N            

 

             GLUCOSE (test code = GLU) 111 MG/DL           H            

 

             BLOOD UREA NITROGEN (test code = 41 MG/DL     7-18         H       

     



             BUN)                                                

 

             GLOMERULAR FILTRATION RATE (test 5 estGFR     >60          L       

     



             code = GFR)                                         

 

             CREATININE (test code = CREAT) 8.6 MG/DL    0.6-1.0      H         

   

 

             CALCIUM (test code = CA) 9.8 MG/DL    8.5-10.1     N            



- XR CHEST [id number] -78-92 13:28:00
********************************[facility] 1955 Sex: 
F************************************************************ Name: 
SUZI SEARS [facility] : 1955 Age/S: 66 / F [id number] 
Unit #: [id number] Loc: [place] [address] Phys: Clark Cuellar MD Acct: 
[id number] Dis Date: Status: PRE SDC  PHONE #: [phone] Exam Date: 
2022 1253 FAX #: Reason: PRE OP EXAMS:  CPT: [id number] XR CHEST 2 V 91890 
Fluoro Time: DAP (Gy m2): Air Kerma (mGy): Chest 2 views 2022 1:27 PM 
CLINICAL HISTORY: Preop COMPARISON: None available LOCATION: W1 IMPRESSION: 
There is elevation of the right hemidiaphragm.Bibasilar opacities suggest 
atelectasis. Pneumonia should be excluded clinically. Cardiomediastinal contours
 are within normal limits. The central vasculature is not engorged. A tunneled 
left hemodialysis catheter is present. There are chronic appearing degenerative 
changes in the skeleton. ** Electronically Signed by M.D. Timothy Seipel ** ** 
on 2022 at 1328 ** Reported and signed by: Timothy Seipel, M.D.  CC: 
Charisse Cyone MD; Clark Cuellar MD PAGE 1 Signed Report Name: 
SUZI SEARS [facility] : 1955 Age/S: 66 / F [id number] 
Unit #: [id number] Loc: [place] [id number] Phys: Clark Cuellar MD Acct: 
[id number] Dis Date: Status: PRE SDC PHONE #: [phone] Exam Date: 
2022 1253 FAX #: Reason: PRE OP EXAMS: CPT: [id number] XR CHEST 2 V 51273 
Fluoro Time:DAP (Gy m2): Air Kerma (mGy):  (Continued) Technologist: Kylah Jacobo RT (R)(CT) Trnscb Date/Time: 2022 (1328) t.[email or web address] Orig Print D/T: 
S: 2022 (0693)  PAGE 2 Signed ReportBASIC METABOLIC [id number] 
12:55:00





             Test Item    Value        Reference Range Interpretation Comments

 

             SODIUM (test code = NA) 134 mmol/L   134-147      N            

 

             POTASSIUM (test code = K) 4.6 mmol/L   3.4-5.0      N            

 

             CHLORIDE (test code = CL) 100 mmol/L   100-108      N            

 

             CARBON DIOXIDE (test code = CO2) 28 mmol/L    21-32        N       

     

 

             ANION GAP (test code = GAP) 6.0 GAP calc 4.0-15.0     N            

 

             GLUCOSE (test code = GLU) 113 MG/DL           H            

 

             BLOOD UREA NITROGEN (test code = 38 MG/DL     7-18         H       

     



             BUN)                                                

 

             GLOMERULAR FILTRATION RATE (test 6 estGFR     >60          L       

     



             code = GFR)                                         

 

             CREATININE (test code = CREAT) 7.0 MG/DL    0.6-1.0      H         

   

 

             CALCIUM (test code = CA) 10.5 MG/DL   8.5-10.1     H            



COVID 19 INHOUSE [id number] 12:52:00





             Test Item    Value        Reference Range Interpretation Comments

 

             COVID 19 INHOUSE AG NEGATIVE     Negative                  Per manu

facturer,



             (test code =                                        negative result

s should



             [id number])                                        be treated aspr

esumptive



                                                                 and, if inconsi

stent with



                                                                 clinical signs



                                                                 andsymptoms or 

necessary



                                                                 for patient man

agement,



                                                                 should betested

 with an



                                                                 alternative mol

ecular



                                                                 assay. Negative

 resultsdo



                                                                 not preclude SA

RS-CoV-2



                                                                 infection and s

hould not



                                                                 be usedas the s

ole basis



                                                                 for patient man

agement



                                                                 decisions. Nega

tive



                                                                 results should 

be



                                                                 considered in t

he context



                                                                 of apatient's r

ecent



                                                                 exposures, hist

ory,



                                                                 presence of cli

nicalsigns



                                                                 and symptoms co

nsistent



                                                                 with COVID-19.



PROTHROMBIN [id number] 12:44:00





             Test Item    Value        Reference Range Interpretation Comments

 

             PT PATIENT (test 12.2 SECONDS 9.3-12.9     N            



             code = PTP)                                         

 

             INTERNATIONAL NORMAL 1.07 INR Unit 0.8-1.2      N              TARG

ET INR BY



             RATIO (test code =                                        INDICATIO

N Indication



             INR)                                                INR1. Prophylax

is of



                                                                 venous thrombos

is 2.0



                                                                 - 3.0 (orthoped

ic



                                                                 surgery), Proph

ylaxis



                                                                 of venous throm

bosis



                                                                 (other than hig

h-risk



                                                                 surgery), Treat

ment of



                                                                 Deep Vein



                                                                 Thrombosis/Pulm

onary



                                                                 Embolism, Preve

ntion



                                                                 of systemic emb

olism -



                                                                 Tissue heart va

lves,



                                                                 Acute Myocardia

l



                                                                 Infarction (to 

prevent



                                                                 systemic emboli

sm),



                                                                 Valvular heart



                                                                 disease, Acute



                                                                 Myocardial Infa

rction



                                                                 (to prevent sys

temic



                                                                 embolism), Valv

ular



                                                                 heart disease, 

Atrial



                                                                 Fibrillation,



                                                                 Bileaflet mecha

nical



                                                                 valve in aortic



                                                                 position.2. Mec

hanical



                                                                 prosthetic valv

es



                                                                 (high risk), 2.

5 - 3.5



                                                                 Presence of Lup

us



                                                                 Anticoagulant o

r



                                                                 Antiphospholipi

d



                                                                 Antibodies, Pre

vention



                                                                 of systemic emb

olism -



                                                                 Acute Myocardia

l



                                                                 Infarction (to 

prevent



                                                                 recurrent infar

ct).



THROMBOPLASTIN TIME [id number] 12:44:00





             Test Item    Value        Reference Range Interpretation Comments

 

             THROMBOPLASTIN TIME PARTIAL 36.2 SECONDS 26-35        H            



             (test code = PTT)                                        



CBC W/AUTO [id number] 12:43:00





             Test Item    Value        Reference Range Interpretation Comments

 

             WHITE BLOOD CELL (test code = 9.1 K/mm3    3.5-11.0     N          

  



             WBC)                                                

 

             RED BLOOD CELL (test code = 4.16 M/mm3   4.70-6.10    L            



             RBC)                                                

 

             HEMOGLOBIN (test code = HGB) 13.1 G/DL    10.4-14.9    N           

 

 

             HEMATOCRIT (test code = HCT) 41.8 %       31.5-44.1    N           

 

 

             MEAN CELL VOLUME (test code = 100.5 Fl     84.5-98.6    H          

  



             MCV)                                                

 

             MEAN CELL HGB (test code = MCH) 31.5 pg      27.0-34.2    N        

    

 

             MEAN CELL HGB CONCETRATION 31.3 G/DL    31.5-34.0    L            



             (test code = MCHC)                                        

 

             RED CELL DISTRIBUTION WIDTH 13.8 SD      11.5-14.5    N            



             (test code = RDW)                                        

 

             PLATELET COUNT (test code = 140 K/mm3    150-450      L            



             PLT)                                                

 

             MEAN PLATELET VOLUME (test code 10.20 fL     7.0-10.5     N        

    



             = MPV)                                              

 

             NEUTROPHIL % (test code = NT%) 71.8 %       40-76        N         

   

 

             IMMATURE GRANULOCYTE % (test 1.3 %        0.0-5.0      N           

 



             code = IG%)                                         

 

             LYMPHOCYTE % (test code = LY%) 18.2 %       20.5-51.1    L         

   

 

             MONOCYTE % (test code = MO%) 6.7 %        1.7-9.3      N           

 

 

             EOSINOPHIL % (test code = EO%) 1.3 %        0.0-6.0      N         

   

 

             BASOPHIL % (test code = BA%) 0.7 %        0.0-2.0      N           

 

 

             NUCLEATED RBC % (test code = 0.0 /100WBC% 0.0-1.0      N           

 



             NRBC%)                                              

 

             NEUTROPHIL # (test code = NT#) 6.5 K/mm3    1.8-7.6      N         

   

 

             IMMATURE GRANULOCYTE # (test 0.12 x10 3/uL 0.00-0.03    H          

  



             code = IG#)                                         

 

             LYMPHOCYTE # (test code = LY#) 1.7 K/mm3    0.6-3.2      N         

   

 

             MONOCYTE # (test code = MO#) 0.6 K/mm3    0.3-1.1      N           

 

 

             EOSINOPHIL # (test code = EO#) 0.1 K/mm3    0.0-0.4      N         

   

 

             BASOPHIL # (test code = BA#) 0.1 K/mm3    0.0-0.1      N           

 

 

             NUCLEATED RBC # (test code = 0.0 K/mm3    0.0-0.1      N           

 



             NRBC#)                                              

 

             MANUAL DIFF REQUIRED (test code NO DIFF/SCN  CRITERIA              

    



             = MDIFF)                                            



SARS-CoV-2 (COVID-19) RNA [Presence] in Respiratory specimen by EDWARD with probe 
[id number] 23:10:10





             Test Item    Value        Reference Range Interpretation Comments

 

             SARS coronavirus RNA [Presence] Not detected                       

    



             in Isolate by EDWARD with probe                                       

 



             detection (test code = 90000-4)                                    

    

 

             Whether patient is employed in a No                                

     



             healthcare setting (test code =                                    

    



             76734-8)                                            

 

             Whether the patient has symptoms Yes                               

     



             related to condition of interest                                   

     



             (test code = 64937-5)                                        

 

             Whether the patient was No                                     



             hospitalized for condition of                                      

  



             interest (test code = 87611-0)                                     

   

 

             Whether the patient was admitted No                                

     



             to intensive care unit (ICU) for                                   

     



             condition of interest (test code                                   

     



             = 26624-9)                                          

 

             Whether patient resides in a No                                    

 



             congregate care setting (test                                      

  



             code = 61873-5)                                        

 

             Pregnancy status (test code = No                                   

  



             10296-3)                                            

 

             Date and time of symptom onset Unknown                             

   



             (test code = 07932-4)                                        



[facility] [Presence] in 
Respiratory specimen by EDWARD with probe [id number] 23:10:10





             Test Item    Value        Reference Range Interpretation Comments

 

             SARS-CoV-2 (COVID-19) RNA Not detected                           



             [Presence] in Respiratory                                        



             specimen by EDWARD with probe                                        



             detection (test code = 88107-2)                                    

    

 

             Whether patient is employed in a No                                

     



             healthcare setting (test code =                                    

    



             40483-9)                                            

 

             Whether the patient has symptoms Yes                               

     



             related to condition of interest                                   

     



             (test code = 76765-5)                                        

 

             Whether the patient was No                                     



             hospitalized for condition of                                      

  



             interest (test code = 14093-7)                                     

   

 

             Whether the patient was admitted No                                

     



             to intensive care unit (ICU) for                                   

     



             condition of interest (test code                                   

     



             = 15948-8)                                          

 

             Whether patient resides in a No                                    

 



             congregate care setting (test                                      

  



             code = 54864-2)                                        

 

             Pregnancy status (test code = No                                   

  



             77372-3)                                            

 

             Date and time of symptom onset Unknown                             

   



             (test code = 65072-5)                                        



[facility] [Presence] in 
Respiratory specimen by EDWARD with probe [id number] 22:34:30





             Test Item    Value        Reference Range Interpretation Comments

 

             SARS-CoV-2 (COVID-19) RNA Not detected Not-Detected              



             [Presence] in Respiratory                                        



             specimen by EDWARD with probe                                        



             detection (test code = 80912-7)                                    

    

 

             Whether patient is employed in a                                   

     



             healthcare setting (test code =                                    

    



             52865-6)                                            

 

             Whether the patient has symptoms                                   

     



             related to condition of interest                                   

     



             (test code = 51880-1)                                        

 

             Patient was hospitalized because                                   

     



             of this condition (test code =                                     

   



             61132-1)                                            

 

             Whether the patient was admitted                                   

     



             to intensive care unit (ICU) for                                   

     



             condition of interest (test code                                   

     



             = 42011-0)                                          

 

             Whether patient resides in a                                       

 



             congregate care setting (test                                      

  



             code = 33629-1)                                        



[facility] (C[facility]-19) RNA [Presence] in 
Respiratory specimen by EDWARD with probe [id number] 22:35:42





             Test Item    Value        Reference Range Interpretation Comments

 

             SARS-CoV-2 (COVID-19) RNA Not detected Not-Detected              



             [Presence] in Respiratory                                        



             specimen by EDWARD with probe                                        



             detection (test code = 05991-3)                                    

    

 

             Whether patient is employed in a                                   

     



             healthcare setting (test code =                                    

    



             31148-9)                                            

 

             Whether the patient has symptoms                                   

     



             related to condition of interest                                   

     



             (test code = 56489-7)                                        

 

             Patient was hospitalized because                                   

     



             of this condition (test code =                                     

   



             56225-3)                                            

 

             Whether the patient was admitted                                   

     



             to intensive care unit (ICU) for                                   

     



             condition of interest (test code                                   

     



             = 13676-6)                                          

 

             Whether patient resides in a                                       

 



             congregate care setting (test                                      

  



             code = 11372-7)                                        



[facility] (COVID-19) RNA [Presence] in 
Respiratory specimen by EDWARD with probe [id number] 22:46:10





             Test Item    Value        Reference Range Interpretation Comments

 

             SARS-CoV-2 (COVID-19) RNA Not detected Not-Detected              



             [Presence] in Respiratory                                        



             specimen by EDWARD with probe                                        



             detection (test code = 32666-8)                                    

    

 

             Whether patient is employed in a                                   

     



             healthcare setting (test code =                                    

    



             88733-0)                                            

 

             Whether the patient has symptoms                                   

     



             related to condition of interest                                   

     



             (test code = 50341-5)                                        

 

             Patient was hospitalized because                                   

     



             of this condition (test code =                                     

   



             77967-5)                                            

 

             Whether the patient was admitted                                   

     



             to intensive care unit (ICU) for                                   

     



             condition of interest (test code                                   

     



             = 13016-7)                                          

 

             Whether patient resides in a                                       

 



             congregate care setting (test                                      

  



             code = 21793-6)                                        



[facility]-CoV-2 (COVID-19) RNA [Presence] in 
Respiratory specimen by EDWARD with probe [id number] 01:54:24





             Test Item    Value        Reference Range Interpretation Comments

 

             SARS-CoV-2 (COVID-19) RNA Not detected Not-Detected              



             [Presence] in Respiratory                                        



             specimen by EDWARD with probe                                        



             detection (test code = 63502-5)                                    

    

 

             Whether patient is employed in a                                   

     



             healthcare setting (test code =                                    

    



             51433-3)                                            

 

             Whether the patient has symptoms                                   

     



             related to condition of interest                                   

     



             (test code = 60037-9)                                        

 

             Patient was hospitalized because                                   

     



             of this condition (test code =                                     

   



             95093-8)                                            

 

             Whether the patient was admitted                                   

     



             to intensive care unit (ICU) for                                   

     



             condition of interest (test code                                   

     



             = 77337-8)                                          

 

             Whether patient resides in a                                       

 



             congregate care setting (test                                      

  



             code = 28532-9)                                        



[facility] [Presence] in 
Respiratory specimen by EDWARD with probe [id number] 21:44:06





             Test Item    Value        Reference Range Interpretation Comments

 

             SARS-CoV-2 (COVID-19) RNA Not detected Not-Detected              



             [Presence] in Respiratory                                        



             specimen by EDWARD with probe                                        



             detection (test code = 52418-8)                                    

    

 

             Whether patient is employed in a                                   

     



             healthcare setting (test code =                                    

    



             02774-2)                                            

 

             Whether the patient has symptoms                                   

     



             related to condition of interest                                   

     



             (test code = 88441-5)                                        

 

             Patient was hospitalized because                                   

     



             of this condition (test code =                                     

   



             21065-4)                                            

 

             Whether the patient was admitted                                   

     



             to intensive care unit (ICU) for                                   

     



             condition of interest (test code                                   

     



             = 83378-8)                                          

 

             Whether patient resides in a                                       

 



             congregate care setting (test                                      

  



             code = 39677-4)                                        



[facility] ([facility] RNA [Presence] in 
Respiratory specimen by EDWARD with probe [id number] 00:36:59





             Test Item    Value        Reference Range Interpretation Comments

 

             SARS-CoV-2 (COVID-19) RNA Not detected Not-Detected              



             [Presence] in Respiratory                                        



             specimen by EDWARD with probe                                        



             detection (test code = 62974-3)                                    

    



[facility] (C[facility]-19) RNA [Presence] in 
Respiratory specimen by EDWARD with probe [id number] 17:35:35





             Test Item    Value        Reference Range Interpretation Comments

 

             SARS-CoV-2 (COVID-19) RNA Not detected Not-Detected              



             [Presence] in Respiratory                                        



             specimen by EDWARD with probe                                        



             detection (test code = 16252-5)                                    

    



[facility] ([facility] RNA [Presence] in 
Respiratory specimen by EDWARD with probe [id number] 18:03:30





             Test Item    Value        Reference Range Interpretation Comments

 

             SARS-CoV-2 (COVID-19) RNA Not detected Not-Detected              



             [Presence] in Respiratory                                        



             specimen by EDWARD with probe                                        



             detection (test code = 90749-8)                                    

    



[facility] ([facility] RNA [Presence] in 
Respiratory specimen by EDWARD with probe [id number] 10:06:03





             Test Item    Value        Reference Range Interpretation Comments

 

             SARS-CoV-2 (COVID-19) RNA Not detected Not-Detected              



             [Presence] in Respiratory                                        



             specimen by EDWARD with probe                                        



             detection (test code = 11749-7)                                    

    



[facility] ([facility] RNA [Presence] in 
Respiratory specimen by EDWARD with probe [id number] 05:11:58





             Test Item    Value        Reference Range Interpretation Comments

 

             SARS-CoV-2 (COVID-19) RNA Not detected Not-Detected              



             [Presence] in Respiratory                                        



             specimen by EDWARD with probe                                        



             detection (test code = 69619-2)                                    

    



[facility] (C[facility]-19) RNA [Presence] in 
Respiratory specimen by EDWARD with probe [id number] 03:34:16





             Test Item    Value        Reference Range Interpretation Comments

 

             SARS-CoV-2 (COVID-19) RNA Not detected Not-Detected              



             [Presence] in Respiratory                                        



             specimen by EDWARD with probe                                        



             detection (test code = 40683-7)                                    

    



[facility] ([facility] RNA [Presence] in 
Respiratory specimen by EDWARD with probe [id number] 10:06:41





             Test Item    Value        Reference Range Interpretation Comments

 

             SARS-CoV-2 (COVID-19) RNA Not detected Not-Detected              



             [Presence] in Respiratory                                        



             specimen by EDWARD with probe                                        



             detection (test code = 13909-7)                                    

    



[facility]





             Test Item    Value        Reference Range Interpretation Comments

 

             Chol (test code = Chol) 129                                    



[facility]2020-10-14 12:33:00





             Test Item    Value        Reference Range Interpretation Comments

 

             HDL (test code = HDL) 37                                     



[facility]2020-10-14 12:33:00





             Test Item    Value        Reference Range Interpretation Comments

 

             Trig (test code = Trig) 76                                     



[facility]2020-10-14 12:33:00





             Test Item    Value        Reference Range Interpretation Comments

 

             LDL (Calculated) (test code = LDL 76                               

      



             (Calculated))                                        



[facility]2020-10-14 12:33:00





             Test Item    Value        Reference Range Interpretation Comments

 

             CHD Risk (test code = CHD Risk) 3.5                                

    



[facility]





             Test Item    Value        Reference Range Interpretation Comments

 

             Non HDL Chol (test code = Non HDL Chol) 92                         

            



[facility]2020-10-14 12:33:00





             Test Item    Value        Reference Range Interpretation Comments

 

             Chol (test code = Chol) 129                                    



[facility]2020-10-14 12:33:00





             Test Item    Value        Reference Range Interpretation Comments

 

             HDL (test code = HDL) 37                                     



[facility] 12:33[facility]





             Test Item    Value        Reference Range Interpretation Comments

 

             Trig (test code = Trig) 76                                     



[facility]2020-10-14 12:33:00





             Test Item    Value        Reference Range Interpretation Comments

 

             LDL (Calculated) (test code = LDL 76                               

      



             (Calculated))                                        



[facility]2020-10-14 12:33:00





             Test Item    Value        Reference Range Interpretation Comments

 

             CHD Risk (test code = CHD Risk) 3.5                                

    



[facility]2020-10-14 12:33:00





             Test Item    Value        Reference Range Interpretation Comments

 

             Non HDL Chol (test code = Non HDL Chol) 92                         

            



[facility]2020-10-14 12:33:00





             Test Item    Value        Reference Range Interpretation Comments

 

             Chol (test code = Chol) 129                                    



[facility]2020-10-14 12:33:00





             Test Item    Value        Reference Range Interpretation Comments

 

             HDL (test code = HDL) 37                                     



[facility]2020-10-14 12:33:00





             Test Item    Value        Reference Range Interpretation Comments

 

             Trig (test code = Trig) 76                                     



[facility]2020-10-14 12:33:00





             Test Item    Value        Reference Range Interpretation Comments

 

             LDL (Calculated) (test code = LDL 76                               

      



             (Calculated))                                        



[facility]2020-10-14 12:33:00





             Test Item    Value        Reference Range Interpretation Comments

 

             CHD Risk (test code = CHD Risk) 3.5                                

    



[facility]





             Test Item    Value        Reference Range Interpretation Comments

 

             Non HDL Chol (test code = Non HDL Chol) 92                         

            



[facility]2020-10-14 12:33:00





             Test Item    Value        Reference Range Interpretation Comments

 

             Chol (test code = Chol) 129                                    



[facility]2020-10-14 12:33:00





             Test Item    Value        Reference Range Interpretation Comments

 

             HDL (test code = HDL) 37                                     



[facility]2020-10-14 12:33:00





             Test Item    Value        Reference Range Interpretation Comments

 

             Trig (test code = Trig) 76                                     



[facility]2020-10-14 12:33:00





             Test Item    Value        Reference Range Interpretation Comments

 

             LDL (Calculated) (test code = LDL 76                               

      



             (Calculated))                                        



[facility]2020-10-14 12:33:00





             Test Item    Value        Reference Range Interpretation Comments

 

             Chol (test code = Chol) 129                                    



[facility]2020-10-14 12:33:00





             Test Item    Value        Reference Range Interpretation Comments

 

             CHD Risk (test code = CHD Risk) 3.5                                

    



[facility]2020-10-14 12:33:00





             Test Item    Value        Reference Range Interpretation Comments

 

             Non HDL Chol (test code = Non HDL Chol) 92                         

            



[facility] 12:33:00





             Test Item    Value        Reference Range Interpretation Comments

 

             HDL (test code = HDL) 37                                     



[facility]2020-10-14 12:33:00





             Test Item    Value        Reference Range Interpretation Comments

 

             Trig (test code = Trig) 76                                     



[facility]2020-10-14 12:33:00





             Test Item    Value        Reference Range Interpretation Comments

 

             LDL (Calculated) (test code = LDL 76                               

      



             (Calculated))                                        



[facility]2020-10-14 12:33:00





             Test Item    Value        Reference Range Interpretation Comments

 

             CHD Risk (test code = CHD Risk) 3.5                                

    



[facility]2020-10-14 12:33:00





             Test Item    Value        Reference Range Interpretation Comments

 

             Non HDL Chol (test code = Non HDL Chol) 92                         

            



[facility] 12:33:00





             Test Item    Value        Reference Range Interpretation Comments

 

             Chol (test code = Chol) 129                                    



[facility]2020-10-14 12:33:00





             Test Item    Value        Reference Range Interpretation Comments

 

             HDL (test code = HDL) 37                                     



[facility]2020-10-14 12:33:00





             Test Item    Value        Reference Range Interpretation Comments

 

             Trig (test code = Trig) 76                                     



[facility]2020-10-14 12:33:00





             Test Item    Value        Reference Range Interpretation Comments

 

             LDL (Calculated) (test code = LDL 76                               

      



             (Calculated))                                        



[facility]2020-10-14 12:33:00





             Test Item    Value        Reference Range Interpretation Comments

 

             CHD Risk (test code = CHD Risk) 3.5                                

    



[facility]14 12:33:00





             Test Item    Value        Reference Range Interpretation Comments

 

             Non HDL Chol (test code = Non HDL Chol) 92                         

            



[place] [address]-10-14 12:33:00





             Test Item    Value        Reference Range Interpretation Comments

 

             Chol (test code = Chol) 129                                    



[facility]2020-10-14 12:33:00





             Test Item    Value        Reference Range Interpretation Comments

 

             HDL (test code = HDL) 37                                     



[facility] [place] [address]00





             Test Item    Value        Reference Range Interpretation Comments

 

             Trig (test code = Trig) 76                                     



[place] [address]-10-14 12:33:00





             Test Item    Value        Reference Range Interpretation Comments

 

             LDL (Calculated) (test code = LDL 76                               

      



             (Calculated))                                        



[place] [address]-10-14 12:33:00





             Test Item    Value        Reference Range Interpretation Comments

 

             CHD Risk (test code = CHD Risk) 3.5                                

    



[place]-10-14 12:33:00





             Test Item    Value        Reference Range Interpretation Comments

 

             Non HDL Chol (test code = Non HDL Chol) 92                         

            



[id number]





             Test Item    Value        Reference Range Interpretation Comments

 

             Vitamin D, 25-OH, Total (test code = 37                      

         



             Vitamin D, 25-OH, Total)                                        



[facility] [id number] 14:47:00





             Test Item    Value        Reference Range Interpretation Comments

 

             U Creat mg/dL (test code = U Creat 114                       

       



             mg/dL)                                              



[facility] [id number] 14:47:00





             Test Item    Value        Reference Range Interpretation Comments

 

             U Prot/Creat (test code = U Prot/Creat) 430                  

            



[facility] for ChildrenDOZ [id number] 14:47:00





             Test Item    Value        Reference Range Interpretation Comments

 

             U Prot/Creat (test code = U 0.430 1      0.021-0.161               



             Prot/Creat)                                         



[facility] FallsTer[facility] Radios [id number] 14:47:00





             Test Item    Value        Reference Range Interpretation Comments

 

             U Protein (test code = U Protein) 49           5-24                

      



[facility] [id number] 14:47:00





             Test Item    Value        Reference Range Interpretation Comments

 

             Glucose Lvl (test code = Glucose Lvl) 103          65-99           

          



[place] 793460-08-06 14:47:00





             Test Item    Value        Reference Range Interpretation Comments

 

             BUN (test code = BUN) 24           7-25                      



[place] 793460-08-06 14:47:00





             Test Item    Value        Reference Range Interpretation Comments

 

             Creatinine Lvl (test code = Creatinine 1.32         0.50-0.99      

           



             Lvl)                                                



[place] 793460-08-06 14:47:00





             Test Item    Value        Reference Range Interpretation Comments

 

             eGFR NON-AFR. AMERICAN (test code = 43                             

        



             eGFR NON-AFR. AMERICAN)                                        



[facility]2020-08-06 14:47:00





             Test Item    Value        Reference Range Interpretation Comments

 

             eGFR  (test code = eGFR 50                         

            



             )                                        



[place] 793460-08-06 14:47:00





             Test Item    Value        Reference Range Interpretation Comments

 

             B/C Ratio (test code = B/C Ratio) 18           6-22                

      



[place] [address]-08-06 14:47:00





             Test Item    Value        Reference Range Interpretation Comments

 

             Sodium Lvl (test code = Sodium Lvl) 138          135-146           

        



[facility]2020-08-06 14:47:00





             Test Item    Value        Reference Range Interpretation Comments

 

             Potassium Lvl (test code = Potassium 4.4          3.5-5.3          

         



             Lvl)                                                



[facility]2020-08-06 14:47:00





             Test Item    Value        Reference Range Interpretation Comments

 

             Chloride Lvl (test code = Chloride Lvl) 102                  

            



[place] 793460-08-06 14:47:00





             Test Item    Value        Reference Range Interpretation Comments

 

             CO2 (test code = CO2) 30           20-32                     



[place] 793460-08-06 14:47:00





             Test Item    Value        Reference Range Interpretation Comments

 

             Calcium Lvl (test code = Calcium Lvl) 9.4          8.6-10.4        

          



[place] 793460-08-06 14:47:00





             Test Item    Value        Reference Range Interpretation Comments

 

             Phosphorus (test code = Phosphorus) 4.8          2.5-4.5           

        



[place] 793460-08-06 14:47:00





             Test Item    Value        Reference Range Interpretation Comments

 

             Albumin Lvl (test code = Albumin Lvl) 3.9          3.6-5.1         

          



[facility]DnsmlwgVCSXGLYDSA5154[facility] 14[facility]





             Test Item    Value        Reference Range Interpretation Comments

 

             Plt Count Estimated (test code = DECREASED                         

     



             Plt Count Estimated)                                        



[facility]ennialXvwsybyCSHVBEPPIW3742-99-11 14:47:00





             Test Item    Value        Reference Range Interpretation Comments

 

             WBC X 10x3 (test code = WBC X 10x3) 7.2          3.8-10.8          

        



[place] [address]-08-06 14:47:00





             Test Item    Value        Reference Range Interpretation Comments

 

             RBC X 10x6 (test code = RBC X 10x6) 3.71         3.80-5.10         

        



[place] 524490-08-06 14:47:00





             Test Item    Value        Reference Range Interpretation Comments

 

             Hgb (test code = Hgb) 10.7         11.7-15.5                 



[place] [address]-08-06 14:47:00





             Test Item    Value        Reference Range Interpretation Comments

 

             Hct (test code = Hct) 33.9         35.0-45.0                 



[place] [address]-08-06 14:47:00





             Test Item    Value        Reference Range Interpretation Comments

 

             MCV (test code = MCV) 91.4         80.0-100.0                



[place] [address]-08-06 14:47:00





             Test Item    Value        Reference Range Interpretation Comments

 

             MCH (test code = MCH) 28.8 pg      27.0-33.0                 



[facility] CentennialYweiumbWHPLSMLUOE2060-77-29 14:47:00





             Test Item    Value        Reference Range Interpretation Comments

 

             MCHC (test code = MCHC) 31.6         32.0-36.0                 



[facility] CentennialDsyklksBFNJEZWFZM3241-90-77 14:47:00





             Test Item    Value        Reference Range Interpretation Comments

 

             RDW (test code = RDW) 13.9         11.0-15.0                 



[place] [address]-08-06 14:47:00





             Test Item    Value        Reference Range Interpretation Comments

 

             Platelet (test code = Platelet) 110          140-400               

    



[facility] CentennialWxwdyqtLPJHACCAEG7598-50-20 14:47:00





             Test Item    Value        Reference Range Interpretation Comments

 

             MPV (test code = MPV) 11.7         7.5-12.5                  



[facility] CentennialRisnbhdNXLEGYOSHX8760-76-37 14:47:00





             Test Item    Value        Reference Range Interpretation Comments

 

             Neutrophils # (test code = Neutrophils 5414         1798-5885      

           



             #)                                                  



[facility] CentennialRwshglbNLAIBFDRQS2216-38-96 14:47:00





             Test Item    Value        Reference Range Interpretation Comments

 

             Lymphocytes # (test code = Lymphocytes 2262         850-2830       

           



             #)                                                  



[facility] FallsBqrdtliIAPQFWHZRM0646-69-89 14:47:00





             Test Item    Value        Reference Range Interpretation Comments

 

             Monocytes # (test code = Monocytes #) 461          200-950         

          



[facility] ChildrenQhhziegAHMHFREPBN9196-26-42 14:47:00





             Test Item    Value        Reference Range Interpretation Comments

 

             Eosinophils # (test code = Eosinophils 122                   

           



             #)                                                  



[facility] HealthOorodkoXVTOJECCOH3608-34-65 14:47:00





             Test Item    Value        Reference Range Interpretation Comments

 

             Basophils # (test code 50           See_Comment                [Aut

omated message] The



             = Basophils #)                                        system which 

generated



                                                                 this result tra

nsmitted



                                                                 reference range

: <=200.



                                                                 The reference r

cheyenne was



                                                                 not used to int

erpret



                                                                 this result as



                                                                 normal/abnormal

.



[facility] HealthLgimsuqNDONFCDHSJ3161-89-61 14:47:00





             Test Item    Value        Reference Range Interpretation Comments

 

             Segs (test code = Segs) 75.2                                   



[facility] HealthAkvjceeIUCQZDZMTW1724-81-01 14:47:00





             Test Item    Value        Reference Range Interpretation Comments

 

             Lymphocytes (test code = Lymphocytes) 16.0                         

          



[facility] HealthKxeirguNSEDXAOBEB2703-46-81 14:47:00





             Test Item    Value        Reference Range Interpretation Comments

 

             Monocytes (test code = Monocytes) 6.4                              

      



[facility] FallsVvwvligEQVOSSBYBX0480-48-34 14:47:00





             Test Item    Value        Reference Range Interpretation Comments

 

             Eosinophils (test code = Eosinophils) 1.7                          

          



[facility] FallsCwvujuuVOUTQDYDZM3044-18-88 14:47:00





             Test Item    Value        Reference Range Interpretation Comments

 

             Basophils (test code = Basophils) 0.7                              

      



[facility] FallsREFKind[facility] [id number] 14:47:00





             Test Item    Value        Reference Range Interpretation Comments

 

             Result 2 (Urine Culture) See Result Comment                        

   



             (test code = Result 2                                        



             (Urine Culture))                                        



[facility] AND [id number] 14:47:00





             Test Item    Value        Reference Range Interpretation Comments

 

             UA Color (test code = UA Color) YELLOW                             

    



[facility] [id number] 14:47:00





             Test Item    Value        Reference Range Interpretation Comments

 

             UA Turbidity (test code = UA CLOUDY                                

 



             Turbidity)                                          



[facility] [id number] 14:47:00





             Test Item    Value        Reference Range Interpretation Comments

 

             UA Spec Grav (test code = UA Spec 1.017 1      1.001-1.035         

      



             Grav)                                               



[facility] [id number] 14:47:00





             Test Item    Value        Reference Range Interpretation Comments

 

             UA pH (test code = UA pH) 5.5 1        5.0-8.0                   



[facility] [id number] 14:47:00





             Test Item    Value        Reference Range Interpretation Comments

 

             UA Glucose (test code = UA Glucose) NEGATIVE                       

        



[facility] [id number] 14:47:00





             Test Item    Value        Reference Range Interpretation Comments

 

             UA Bili (test code = UA Bili) NEGATIVE                             

  



[facility] [id number] 14:47:00





             Test Item    Value        Reference Range Interpretation Comments

 

             UA Ketones (test code = UA Ketones) NEGATIVE                       

        



[facility] [id number] 14:47:00





             Test Item    Value        Reference Range Interpretation Comments

 

             UA Blood (test code = UA Blood) 1+                                 

    



[facility] [id number] 14:47:00





             Test Item    Value        Reference Range Interpretation Comments

 

             UA Protein (test code = UA Protein) 1+                             

        



[facility] [id number] 14:47:00





             Test Item    Value        Reference Range Interpretation Comments

 

             UA Nitrite (test code = UA Nitrite) POSITIVE                       

        



[facility] [id number] 14:47:00





             Test Item    Value        Reference Range Interpretation Comments

 

             UA Leuk Est (test code = UA Leuk Est) 2+                           

          



[facility] [id number] 14:47:00





             Test Item    Value        Reference Range Interpretation Comments

 

             UA WBC (test code = UA WBC) > OR = 60                              



[facility] [id number] 14:47:00





             Test Item    Value        Reference Range Interpretation Comments

 

             UA RBC (test code = UA RBC) 0-2                                    



[facility] [id number] 14:47:00





             Test Item    Value        Reference Range Interpretation Comments

 

             UA Sq Epi (test code = UA Sq Epi) NONE SEEN                        

      



[facility] [id number] 14:47:00





             Test Item    Value        Reference Range Interpretation Comments

 

             UA Bacteria (test code = UA Bacteria) MANY                         

          



[facility] [id number] 14:47:00





             Test Item    Value        Reference Range Interpretation Comments

 

             UA Hyal Cast (test code = UA Hyal NONE SEEN                        

      



             Cast)                                               



[facility] [id number] 14:47:00





             Test Item    Value        Reference Range Interpretation Comments

 

             UA Reflex (test code CULTURE INDICATED -                           



             = UA Reflex) RESULTS TO FOLLOW                           



[facility] [id number] 14:47:00





             Test Item    Value        Reference Range Interpretation Comments

 

             U Creat mg/dL (test code = U Creat 114                       

       



             mg/dL)                                              



[place] 633280-08-06 14:47:00





             Test Item    Value        Reference Range Interpretation Comments

 

             U Alb (test code = U Alb) 16.7                                   



[place] 633280-08-06 14:47:00





             Test Item    Value        Reference Range Interpretation Comments

 

             U Alb/Crea (test code = U Alb/Crea) 146                            

        



[facility]2020-08-06 14:47:00





             Test Item    Value        Reference Range Interpretation Comments

 

             Vitamin D, 25-OH, Total (test code = 37                      

         



             Vitamin D, 25-OH, Total)                                        



[place] 793460-08-06 14:47:00





             Test Item    Value        Reference Range Interpretation Comments

 

             U Creat mg/dL (test code = U Creat 114                       

       



             mg/dL)                                              



[place] 793460-08-06 14:47:00





             Test Item    Value        Reference Range Interpretation Comments

 

             U Prot/Creat (test code = U Prot/Creat) 430                  

            



[facility]2020-08-06 14:47:00





             Test Item    Value        Reference Range Interpretation Comments

 

             U Prot/Creat (test code = U 0.430 1      0.021-0.161               



             Prot/Creat)                                         



[facility]2020-08-06 14:47:00





             Test Item    Value        Reference Range Interpretation Comments

 

             U Protein (test code = U Protein) 49           5-24                

      



[facility]2020-08-06 14:47:00





             Test Item    Value        Reference Range Interpretation Comments

 

             Glucose Lvl (test code = Glucose Lvl) 103          65-99           

          



[place]3460-08-06 14:47:00





             Test Item    Value        Reference Range Interpretation Comments

 

             BUN (test code = BUN) 24           7-25                      



[facility]2020-08-06 14:47:00





             Test Item    Value        Reference Range Interpretation Comments

 

             Creatinine Lvl (test code = Creatinine 1.32         0.50-0.99      

           



             Lvl)                                                



[place] 793460-08-06 14:47:00





             Test Item    Value        Reference Range Interpretation Comments

 

             eGFR NON-AFR. AMERICAN (test code = 43                             

        



             eGFR NON-AFR. AMERICAN)                                        



[place]3460-08-06 14:47:00





             Test Item    Value        Reference Range Interpretation Comments

 

             eGFR  (test code = eGFR 50                         

            



             )                                        



[place] 793460-08-06 14:47:00





             Test Item    Value        Reference Range Interpretation Comments

 

             B/C Ratio (test code = B/C Ratio) 18           6-22                

      



[place]3460-08-06 14:47:00





             Test Item    Value        Reference Range Interpretation Comments

 

             Sodium Lvl (test code = Sodium Lvl) 138          135-146           

        



[place] 793460-08-06 14:47:00





             Test Item    Value        Reference Range Interpretation Comments

 

             Potassium Lvl (test code = Potassium 4.4          3.5-5.3          

         



             Lvl)                                                



[place] 793460-08-06 14:47:00





             Test Item    Value        Reference Range Interpretation Comments

 

             Chloride Lvl (test code = Chloride Lvl) 102                  

            



[place]3460-08-06 14:47:00





             Test Item    Value        Reference Range Interpretation Comments

 

             CO2 (test code = CO2) 30           20-32                     



[place] 793460-08-06 14:47:00





             Test Item    Value        Reference Range Interpretation Comments

 

             Calcium Lvl (test code = Calcium Lvl) 9.4          8.6-10.4        

          



[place]3460-08-06 14:47:00





             Test Item    Value        Reference Range Interpretation Comments

 

             Phosphorus (test code = Phosphorus) 4.8          2.5-4.5           

        



[place]3460-08-06 14:47:00





             Test Item    Value        Reference Range Interpretation Comments

 

             Albumin Lvl (test code = Albumin Lvl) 3.9          3.6-5.1         

          



[place] 524490-08-06 14:47:00





             Test Item    Value        Reference Range Interpretation Comments

 

             Plt Count Estimated (test code = DECREASED                         

     



             Plt Count Estimated)                                        



[place] 524490-08-06 14:47:00





             Test Item    Value        Reference Range Interpretation Comments

 

             WBC X 10x3 (test code = WBC X 10x3) 7.2          3.8-10.8          

        



[place] 524490-08-06 14:47:00





             Test Item    Value        Reference Range Interpretation Comments

 

             RBC X 10x6 (test code = RBC X 10x6) 3.71         3.80-5.10         

        



[place] 524490-08-06 14:47:00





             Test Item    Value        Reference Range Interpretation Comments

 

             Hgb (test code = Hgb) 10.7         11.7-15.5                 



[facility]ennialJbohbnpZSEKYOCTFF4369-56-59 14:47:00





             Test Item    Value        Reference Range Interpretation Comments

 

             Hct (test code = Hct) 33.9         35.0-45.0                 



[facility] CentennialTdleucwGQPAXTWJFW1094-06-31 14:47:00





             Test Item    Value        Reference Range Interpretation Comments

 

             MCV (test code = MCV) 91.4         80.0-100.0                



[facility] CentennialMtxdoapRIDGCOQJZI1365-68-07 14:47:00





             Test Item    Value        Reference Range Interpretation Comments

 

             MCH (test code = MCH) 28.8 pg      27.0-33.0                 



[facility] CentennialBuntmarDEEMDITAVI9952-65-13 14:47:00





             Test Item    Value        Reference Range Interpretation Comments

 

             MCHC (test code = MCHC) 31.6         32.0-36.0                 



[facility] CentennialOwssiqnWOXBANMLRJ8656-66-49 14:47:00





             Test Item    Value        Reference Range Interpretation Comments

 

             RDW (test code = RDW) 13.9         11.0-15.0                 



[facility] CentennialLqwbkomPWXVTBTPVG8477-76-50 14:47:00





             Test Item    Value        Reference Range Interpretation Comments

 

             Platelet (test code = Platelet) 110          140-400               

    



[facility] CentennialChyoztwHWQNZFDERI7835-22-09 14:47:00





             Test Item    Value        Reference Range Interpretation Comments

 

             MPV (test code = MPV) 11.7         7.5-12.5                  



[facility] CentennialRuyasefNKSREZDAQF4800-38-26 14:47:00





             Test Item    Value        Reference Range Interpretation Comments

 

             Neutrophils # (test code = Neutrophils 5414         2555-7491      

           



             #)                                                  



[facility] CentennialZegukomUUDYIMPDHM3508-50-55 14:47:00





             Test Item    Value        Reference Range Interpretation Comments

 

             Lymphocytes # (test code = Lymphocytes 1152         850-3900       

           



             #)                                                  



[facility] CentennialGglctygGGJDVKRYVY6584-92-68 14:47:00





             Test Item    Value        Reference Range Interpretation Comments

 

             Monocytes # (test code = Monocytes #) 461          200-950         

          



[facility] CentennialXnwireaCPBCUHHIET8566-67-43 14:47:00





             Test Item    Value        Reference Range Interpretation Comments

 

             Eosinophils # (test code = Eosinophils 122                   

           



             #)                                                  



[facility] CentennialFgerzfmQSPFIHUXLL4439-20-65 14:47:00





             Test Item    Value        Reference Range Interpretation Comments

 

             Basophils # (test code 50           See_Comment                [Aut

omated message] The



             = Basophils #)                                        system which 

generated



                                                                 this result tra

nsmitted



                                                                 reference range

: <=200.



                                                                 The reference r

cheyenne was



                                                                 not used to int

erpret



                                                                 this result as



                                                                 normal/abnormal

.



[facility] FallsWxcnalfGFEBCZHTZU7290-56-52 14:47:00





             Test Item    Value        Reference Range Interpretation Comments

 

             Segs (test code = Segs) 75.2                                   



[facility]XBCG[facility]SSHXA2647-77-32 14:47:00





             Test Item    Value        Reference Range Interpretation Comments

 

             Lymphocytes (test code = Lymphocytes) 16.0                         

          



[facility] HealthOcnvhdxRSIOLPWCZA8502-68-88 14:47:00





             Test Item    Value        Reference Range Interpretation Comments

 

             Monocytes (test code = Monocytes) 6.4                              

      



[facility] HealthGsnfhcpUAZWGNVTWS5705-97-71 14:47:00





             Test Item    Value        Reference Range Interpretation Comments

 

             Eosinophils (test code = Eosinophils) 1.7                          

          



[facility] HealthHxjxitvGANTJMAGJV5469-95-03 14:47:00





             Test Item    Value        Reference Range Interpretation Comments

 

             Basophils (test code = Basophils) 0.7                              

      



[facility] [id number] 14:47:00





             Test Item    Value        Reference Range Interpretation Comments

 

             Result 2 (Urine Culture) See Result Comment                        

   



             (test code = Result 2                                        



             (Urine Culture))                                        



[facility] AND [id number] 14:47:00





             Test Item    Value        Reference Range Interpretation Comments

 

             UA Color (test code = UA Color) YELLOW                             

    



[facility] AND [id number] 14:47:00





             Test Item    Value        Reference Range Interpretation Comments

 

             UA Turbidity (test code = UA CLOUDY                                

 



             Turbidity)                                          



[facility] AND [id number] 14:47:00





             Test Item    Value        Reference Range Interpretation Comments

 

             UA Spec Grav (test code = UA Spec 1.017 1      1.001-1.035         

      



             Grav)                                               



[facility] AND [id number] 14:47:00





             Test Item    Value        Reference Range Interpretation Comments

 

             UA pH (test code = UA pH) 5.5 1        5.0-8.0                   



[facility] AND [id number] 14:47:00





             Test Item    Value        Reference Range Interpretation Comments

 

             UA Glucose (test code = UA Glucose) NEGATIVE                       

        



[facility] AND [id number] 14:47:00





             Test Item    Value        Reference Range Interpretation Comments

 

             UA Bili (test code = UA Bili) NEGATIVE                             

  



[facility] AND [id number] 14:47:00





             Test Item    Value        Reference Range Interpretation Comments

 

             UA Ketones (test code = UA Ketones) NEGATIVE                       

        



[facility] [id number] 14:47:00





             Test Item    Value        Reference Range Interpretation Comments

 

             UA Blood (test code = UA Blood) 1+                                 

    



[facility] [id number] 14:47:00





             Test Item    Value        Reference Range Interpretation Comments

 

             UA Protein (test code = UA Protein) 1+                             

        



[facility] [id number] 14:47:00





             Test Item    Value        Reference Range Interpretation Comments

 

             UA Nitrite (test code = UA Nitrite) POSITIVE                       

        



[facility] [id number] 14:47:00





             Test Item    Value        Reference Range Interpretation Comments

 

             UA Leuk Est (test code = UA Leuk Est) 2+                           

          



[facility] [id number] 14:47:00





             Test Item    Value        Reference Range Interpretation Comments

 

             UA WBC (test code = UA WBC) > OR = 60                              



[facility] [id number] 14:47:00





             Test Item    Value        Reference Range Interpretation Comments

 

             UA RBC (test code = UA RBC) 0-2                                    



[facility] [id number] 14:47:00





             Test Item    Value        Reference Range Interpretation Comments

 

             UA Sq Epi (test code = UA Sq Epi) NONE SEEN                        

      



[facility] [id number] 14:47:00





             Test Item    Value        Reference Range Interpretation Comments

 

             UA Bacteria (test code = UA Bacteria) MANY                         

          



[facility] [id number] 14:47:00





             Test Item    Value        Reference Range Interpretation Comments

 

             UA Hyal Cast (test code = UA Hyal NONE SEEN                        

      



             Cast)                                               



[facility] [id number] 14:47:00





             Test Item    Value        Reference Range Interpretation Comments

 

             UA Reflex (test code CULTURE INDICATED -                           



             = UA Reflex) RESULTS TO FOLLOW                           



[facility] [id number] 14:47:00





             Test Item    Value        Reference Range Interpretation Comments

 

             U Creat mg/dL (test code = U Creat 114                       

       



             mg/dL)                                              



[facility] [id number] 14:47:00





             Test Item    Value        Reference Range Interpretation Comments

 

             U Alb (test code = U Alb) 16.7                                   



[facility] [id number] 14:47:00





             Test Item    Value        Reference Range Interpretation Comments

 

             U Alb/Crea (test code = U Alb/Crea) 146                            

        



[facility] [id number] 14:47:00





             Test Item    Value        Reference Range Interpretation Comments

 

             Vitamin D, 25-OH, Total (test code = 37                      

         



             Vitamin D, 25-OH, Total)                                        



[facility]2020-08-06 14:47:00





             Test Item    Value        Reference Range Interpretation Comments

 

             U Creat mg/dL (test code = U Creat 114                       

       



             mg/dL)                                              



[place]3460-08-06 14:47:00





             Test Item    Value        Reference Range Interpretation Comments

 

             U Prot/Creat (test code = U Prot/Creat) 430                  

            



[place] 793460-08-06 14:47:00





             Test Item    Value        Reference Range Interpretation Comments

 

             U Prot/Creat (test code = U 0.430 1      0.021-0.161               



             Prot/Creat)                                         



[facility]2020-08-06 14:47:00





             Test Item    Value        Reference Range Interpretation Comments

 

             U Protein (test code = U Protein) 49           5-24                

      



[place] 793460-08-06 14:47:00





             Test Item    Value        Reference Range Interpretation Comments

 

             Glucose Lvl (test code = Glucose Lvl) 103          65-99           

          



[place] 793460-08-06 14:47:00





             Test Item    Value        Reference Range Interpretation Comments

 

             BUN (test code = BUN) 24           7-25                      



[place] 793460-08-06 14:47:00





             Test Item    Value        Reference Range Interpretation Comments

 

             Creatinine Lvl (test code = Creatinine 1.32         0.50-0.99      

           



             Lvl)                                                



[facility]2020-08-06 14:47:00





             Test Item    Value        Reference Range Interpretation Comments

 

             eGFR NON-AFR. AMERICAN (test code = 43                             

        



             eGFR NON-AFR. AMERICAN)                                        



[facility]2020-08-06 14:47:00





             Test Item    Value        Reference Range Interpretation Comments

 

             eGFR  (test code = eGFR 50                         

            



             )                                        



[place] 793460-08-06 14:47:00





             Test Item    Value        Reference Range Interpretation Comments

 

             B/C Ratio (test code = B/C Ratio) 18           6-22                

      



[place] 793460-08-06 14:47:00





             Test Item    Value        Reference Range Interpretation Comments

 

             Sodium Lvl (test code = Sodium Lvl) 138          135-146           

        



[place] 793460-08-06 14:47:00





             Test Item    Value        Reference Range Interpretation Comments

 

             Potassium Lvl (test code = Potassium 4.4          3.5-5.3          

         



             Lvl)                                                



[place] 793460-08-06 14:47:00





             Test Item    Value        Reference Range Interpretation Comments

 

             Chloride Lvl (test code = Chloride Lvl) 102                  

            



[place] 793460-08-06 14:47:00





             Test Item    Value        Reference Range Interpretation Comments

 

             CO2 (test code = CO2) 30           20-32                     



[place] 793460-08-06 14:47:00





             Test Item    Value        Reference Range Interpretation Comments

 

             Calcium Lvl (test code = Calcium Lvl) 9.4          8.6-10.4        

          



[place] [address]-08-06 14:47:00





             Test Item    Value        Reference Range Interpretation Comments

 

             Phosphorus (test code = Phosphorus) 4.8          2.5-4.5           

        



[place] 793460-08-06 14:47:00





             Test Item    Value        Reference Range Interpretation Comments

 

             Albumin Lvl (test code = Albumin Lvl) 3.9          3.6-5.1         

          



[place] [address]-08-06 14:47:00





             Test Item    Value        Reference Range Interpretation Comments

 

             Plt Count Estimated (test code = DECREASED                         

     



             Plt Count Estimated)                                        



[place] [address]-08-06 14:47:00





             Test Item    Value        Reference Range Interpretation Comments

 

             WBC X 10x3 (test code = WBC X 10x3) 7.2          3.8-10.8          

        



[place] [address]-08-06 14:47:00





             Test Item    Value        Reference Range Interpretation Comments

 

             RBC X 10x6 (test code = RBC X 10x6) 3.71         3.80-5.10         

        



[place] [address]-08-06 14:47:00





             Test Item    Value        Reference Range Interpretation Comments

 

             Hgb (test code = Hgb) 10.7         11.7-15.5                 



[place] [address]-08-06 14:47:00





             Test Item    Value        Reference Range Interpretation Comments

 

             Hct (test code = Hct) 33.9         35.0-45.0                 



[place] [address]-08-06 14:47:00





             Test Item    Value        Reference Range Interpretation Comments

 

             MCV (test code = MCV) 91.4         80.0-100.0                



[place] [address]-08-06 14:47:00





             Test Item    Value        Reference Range Interpretation Comments

 

             MCH (test code = MCH) 28.8 pg      27.0-33.0                 



[facility]pkbiewHCPKKQYXAO1475-23-26 14:47:00





             Test Item    Value        Reference Range Interpretation Comments

 

             MCHC (test code = MCHC) 31.6         32.0-36.0                 



[facility]ennialQssniymGPAQYCNNKR9117-90-88 14:47:00





             Test Item    Value        Reference Range Interpretation Comments

 

             RDW (test code = RDW) 13.9         11.0-15.0                 



[facility]ennialZnjzstbIGWGQHKNYL6638-92-82 14:47:00





             Test Item    Value        Reference Range Interpretation Comments

 

             Platelet (test code = Platelet) 110          140-400               

    



[facility] CentennialPddnlvuRRHZOHSNND3592-95-07 14:47:00





             Test Item    Value        Reference Range Interpretation Comments

 

             MPV (test code = MPV) 11.7         7.5-12.5                  



[facility]ennialTjfseqnJBJYCZCFMY7526-42-80 14:47:00





             Test Item    Value        Reference Range Interpretation Comments

 

             Neutrophils # (test code = Neutrophils 5414         0657-0831      

           



             #)                                                  



[facility]ennialSjdvilfYPZIBQAXMB1376-12-84 14:47:00





             Test Item    Value        Reference Range Interpretation Comments

 

             Lymphocytes # (test code = Lymphocytes 1152         850-3900       

           



             #)                                                  



[facility] CentennialLaykvmiDVJPHXFDAS2447-76-34 14:47:00





             Test Item    Value        Reference Range Interpretation Comments

 

             Monocytes # (test code = Monocytes #) 461          200-950         

          



[facility] CentennialDekibcsCJAZKLVPUP0293-78-79 14:47:00





             Test Item    Value        Reference Range Interpretation Comments

 

             Eosinophils # (test code = Eosinophils 122                   

           



             #)                                                  



[facility]ennialIyklcywMCGKXTBYXU4623-33-69 14:47:00





             Test Item    Value        Reference Range Interpretation Comments

 

             Basophils # (test code 50           See_Comment                [Aut

omated message] The



             = Basophils #)                                        system which 

generated



                                                                 this result tra

nsmitted



                                                                 reference range

: <=200.



                                                                 The reference r

cheyenne was



                                                                 not used to int

erpret



                                                                 this result as



                                                                 normal/abnormal

.



[facility] CentennialNoxvsmbHRUGLJNRMT7984-34-67 14:47:00





             Test Item    Value        Reference Range Interpretation Comments

 

             Segs (test code = Segs) 75.2                                   



[facility] CentennialSoddqteIXPWELCUTG0318-71-05 14:47:00





             Test Item    Value        Reference Range Interpretation Comments

 

             Lymphocytes (test code = Lymphocytes) 16.0                         

          



[facility] CentennialBalpuduPTBUELAKUG8696-49-17 14:47:00





             Test Item    Value        Reference Range Interpretation Comments

 

             Monocytes (test code = Monocytes) 6.4                              

      



[place] 524490-08-06 14:47:00





             Test Item    Value        Reference Range Interpretation Comments

 

             Eosinophils (test code = Eosinophils) 1.7                          

          



[facility] ChildrenVkcncxqXCFPLNPQZY6539-99-25 14:47:00





             Test Item    Value        Reference Range Interpretation Comments

 

             Basophils (test code = Basophils) 0.7                              

      



[facility] [id number] 14:47:00





             Test Item    Value        Reference Range Interpretation Comments

 

             Result 2 (Urine Culture) See Result Comment                        

   



             (test code = Result 2                                        



             (Urine Culture))                                        



[facility] AND [id number] 14:47:00





             Test Item    Value        Reference Range Interpretation Comments

 

             UA Color (test code = UA Color) YELLOW                             

    



[facility] AND [id number] 14:47:00





             Test Item    Value        Reference Range Interpretation Comments

 

             UA Turbidity (test code = UA CLOUDY                                

 



             Turbidity)                                          



[facility] AND [id number] 14:47:00





             Test Item    Value        Reference Range Interpretation Comments

 

             UA Spec Grav (test code = UA Spec 1.017 1      1.001-1.035         

      



             Grav)                                               



[facility] AND [id number] 14:47:00





             Test Item    Value        Reference Range Interpretation Comments

 

             UA pH (test code = UA pH) 5.5 1        5.0-8.0                   



[facility] AND [id number] 14:47:00





             Test Item    Value        Reference Range Interpretation Comments

 

             UA Glucose (test code = UA Glucose) NEGATIVE                       

        



[facility] AND [id number] 14:47:00





             Test Item    Value        Reference Range Interpretation Comments

 

             UA Bili (test code = UA Bili) NEGATIVE                             

  



[facility] AND [id number] 14:47:00





             Test Item    Value        Reference Range Interpretation Comments

 

             UA Ketones (test code = UA Ketones) NEGATIVE                       

        



[facility] AND [id number] 14:47:00





             Test Item    Value        Reference Range Interpretation Comments

 

             UA Blood (test code = UA Blood) 1+                                 

    



[facility] AND [id number] 14:47:00





             Test Item    Value        Reference Range Interpretation Comments

 

             UA Protein (test code = UA Protein) 1+                             

        



[facility] AND [id number] 14:47:00





             Test Item    Value        Reference Range Interpretation Comments

 

             UA Nitrite (test code = UA Nitrite) POSITIVE                       

        



[facility] AND [id number] 14:47:00





             Test Item    Value        Reference Range Interpretation Comments

 

             UA Leuk Est (test code = UA Leuk Est) 2+                           

          



[facility] [id number] 14:47:00





             Test Item    Value        Reference Range Interpretation Comments

 

             UA WBC (test code = UA WBC) > OR = 60                              



[facility] [id number] 14:47:00





             Test Item    Value        Reference Range Interpretation Comments

 

             UA RBC (test code = UA RBC) 0-2                                    



[facility] [id number] 14:47:00





             Test Item    Value        Reference Range Interpretation Comments

 

             UA Sq Epi (test code = UA Sq Epi) NONE SEEN                        

      



[facility] [id number] 14:47:00





             Test Item    Value        Reference Range Interpretation Comments

 

             UA Bacteria (test code = UA Bacteria) MANY                         

          



[facility] [id number] 14:47:00





             Test Item    Value        Reference Range Interpretation Comments

 

             UA Hyal Cast (test code = UA Hyal NONE SEEN                        

      



             Cast)                                               



[facility] AND [id number] 14:47:00





             Test Item    Value        Reference Range Interpretation Comments

 

             UA Reflex (test code CULTURE INDICATED -                           



             = UA Reflex) RESULTS TO FOLLOW                           



[facility]2020-08-06 14:47:00





             Test Item    Value        Reference Range Interpretation Comments

 

             U Creat mg/dL (test code = U Creat 114                       

       



             mg/dL)                                              



[facility]2020-08-06 14:47:00





             Test Item    Value        Reference Range Interpretation Comments

 

             U Alb (test code = U Alb) 16.7                                   



[facility]2020-08-06 14:47:00





             Test Item    Value        Reference Range Interpretation Comments

 

             U Alb/Crea (test code = U Alb/Crea) 146                            

        



[facility] [id number] 14:47:00





             Test Item    Value        Reference Range Interpretation Comments

 

             Vitamin D, 25-OH, Total (test code = 37                      

         



             Vitamin D, 25-OH, Total)                                        



[facility] [id number] 14:47:00





             Test Item    Value        Reference Range Interpretation Comments

 

             U Creat mg/dL (test code = U Creat 114                       

       



             mg/dL)                                              



[facility] [id number] 14:47:00





             Test Item    Value        Reference Range Interpretation Comments

 

             U Prot/Creat (test code = U Prot/Creat) 430                  

            



[facility]2020-08-06 14:47:00





             Test Item    Value        Reference Range Interpretation Comments

 

             U Prot/Creat (test code = U 0.430 1      0.021-0.161               



             Prot/Creat)                                         



[place]3460-08-06 14:47:00





             Test Item    Value        Reference Range Interpretation Comments

 

             U Protein (test code = U Protein) 49           5-24                

      



[place] [address]-08-06 14:47:00





             Test Item    Value        Reference Range Interpretation Comments

 

             Glucose Lvl (test code = Glucose Lvl) 103          65-99           

          



[place]3460-08-06 14:47:00





             Test Item    Value        Reference Range Interpretation Comments

 

             BUN (test code = BUN) 24           7-25                      



[place] [address]-08-06 14:47:00





             Test Item    Value        Reference Range Interpretation Comments

 

             Creatinine Lvl (test code = Creatinine 1.32         0.50-0.99      

           



             Lvl)                                                



[place] 793460-08-06 14:47:00





             Test Item    Value        Reference Range Interpretation Comments

 

             eGFR NON-AFR. AMERICAN (test code = 43                             

        



             eGFR NON-AFR. AMERICAN)                                        



[place] 793460-08-06 14:47:00





             Test Item    Value        Reference Range Interpretation Comments

 

             eGFR  (test code = eGFR 50                         

            



             )                                        



[place] 793460-08-06 14:47:00





             Test Item    Value        Reference Range Interpretation Comments

 

             B/C Ratio (test code = B/C Ratio) 18           6-22                

      



[place] 99259-08-06 14:47:00





             Test Item    Value        Reference Range Interpretation Comments

 

             Sodium Lvl (test code = Sodium Lvl) 138          135-146           

        



[place] 793460-08-06 14:47:00





             Test Item    Value        Reference Range Interpretation Comments

 

             Potassium Lvl (test code = Potassium 4.4          3.5-5.3          

         



             Lvl)                                                



[place] 793460-08-06 14:47:00





             Test Item    Value        Reference Range Interpretation Comments

 

             Chloride Lvl (test code = Chloride Lvl) 102                  

            



[place] 99259-08-06 14:47:00





             Test Item    Value        Reference Range Interpretation Comments

 

             CO2 (test code = CO2) 30           20-32                     



[place] 793460-08-06 14:47:00





             Test Item    Value        Reference Range Interpretation Comments

 

             Calcium Lvl (test code = Calcium Lvl) 9.4          8.6-10.4        

          



[facility]2020-08-06 14:47:00





             Test Item    Value        Reference Range Interpretation Comments

 

             Phosphorus (test code = Phosphorus) 4.8          2.5-4.5           

        



[facility] [id number] 14:47:00





             Test Item    Value        Reference Range Interpretation Comments

 

             Albumin Lvl (test code = Albumin Lvl) 3.9          3.6-5.1         

          



[facility]kpbshaLQEKYGRKOM2241-77-72 14:47:00





             Test Item    Value        Reference Range Interpretation Comments

 

             Plt Count Estimated (test code = DECREASED                         

     



             Plt Count Estimated)                                        



[facility] CentennialKdaveuyKUCYAQIQDC2355-37-46 14:47:00





             Test Item    Value        Reference Range Interpretation Comments

 

             WBC X 10x3 (test code = WBC X 10x3) 7.2          3.8-10.8          

        



[place] 524490-08-06 14:47:00





             Test Item    Value        Reference Range Interpretation Comments

 

             RBC X 10x6 (test code = RBC X 10x6) 3.71         3.80-5.10         

        



[facility] CentennialTucosgzHKNXPXITLK6517-30-85 14:47:00





             Test Item    Value        Reference Range Interpretation Comments

 

             Hgb (test code = Hgb) 10.7         11.7-15.5                 



[facility] CentennialBjakqhsFGKHKPCGLF4598-09-29 14:47:00





             Test Item    Value        Reference Range Interpretation Comments

 

             Hct (test code = Hct) 33.9         35.0-45.0                 



[facility] CentennialUlnzobuFPAIKNFIJE1831-09-91 14:47:00





             Test Item    Value        Reference Range Interpretation Comments

 

             MCV (test code = MCV) 91.4         80.0-100.0                



[facility] CentennialPqbwbkyTJDDSCMSTT9578-49-67 14:47:00





             Test Item    Value        Reference Range Interpretation Comments

 

             MCH (test code = MCH) 28.8 pg      27.0-33.0                 



[place] 524490-08-06 14:47:00





             Test Item    Value        Reference Range Interpretation Comments

 

             MCHC (test code = MCHC) 31.6         32.0-36.0                 



[place] 524490-08-06 14:47:00





             Test Item    Value        Reference Range Interpretation Comments

 

             RDW (test code = RDW) 13.9         11.0-15.0                 



[facility] CentennialLsngtplSIBASEKWEU1241-04-72 14:47:00





             Test Item    Value        Reference Range Interpretation Comments

 

             Platelet (test code = Platelet) 110          140-400               

    



[place] 524490-08-06 14:47:00





             Test Item    Value        Reference Range Interpretation Comments

 

             MPV (test code = MPV) 11.7         7.5-12.5                  



[facility] HealthIortnwzBCJWRBRWBB8001-41-91 14:47:00





             Test Item    Value        Reference Range Interpretation Comments

 

             Neutrophils # (test code = Neutrophils 5414         3336-8458      

           



             #)                                                  



[facility] HealthNaeockxAQWRFTCHDS6468-77-77 14:47:00





             Test Item    Value        Reference Range Interpretation Comments

 

             Lymphocytes # (test code = Lymphocytes 1152         850-3900       

           



             #)                                                  



[facility] HealthCtwrmyzNPLGCMATSA5266-00-27 14:47:00





             Test Item    Value        Reference Range Interpretation Comments

 

             Monocytes # (test code = Monocytes #) 461          200-950         

          



[facility] HealthFgubsopLJGEQAKYUV2698-96-19 14:47:00





             Test Item    Value        Reference Range Interpretation Comments

 

             Eosinophils # (test code = Eosinophils 122                   

           



             #)                                                  



[facility] CentennialZyqluhhIESSTTWDOO0728-07-27 14:47:00





             Test Item    Value        Reference Range Interpretation Comments

 

             Basophils # (test code 50           See_Comment                [Aut

omated message] The



             = Basophils #)                                        system which 

generated



                                                                 this result tra

nsmitted



                                                                 reference range

: <=200.



                                                                 The reference r

cheyenne was



                                                                 not used to int

erpret



                                                                 this result as



                                                                 normal/abnormal

.



[facility] HealthIsuihhlTCJUDMBXJO1350-89-62 14:47:00





             Test Item    Value        Reference Range Interpretation Comments

 

             Segs (test code = Segs) 75.2                                   



[facility] CentennialYfnytvwGGGIVHAYFA1803-31-81 14:47:00





             Test Item    Value        Reference Range Interpretation Comments

 

             Lymphocytes (test code = Lymphocytes) 16.0                         

          



[facility] HealthYwiakopDWPLDQDNGR8791-88-11 14:47:00





             Test Item    Value        Reference Range Interpretation Comments

 

             Monocytes (test code = Monocytes) 6.4                              

      



[facility] HealthWzewomyMWOBFKRNDC4122-46-24 14:47:00





             Test Item    Value        Reference Range Interpretation Comments

 

             Eosinophils (test code = Eosinophils) 1.7                          

          



[facility] FallsGzdnyiiYCSJVPYZFL5091-80-40 14:47:00





             Test Item    Value        Reference Range Interpretation Comments

 

             Basophils (test code = Basophils) 0.7                              

      



[facility] FallsREFERENCE LAB OUSNUDU3173-03-54 14:47:00





             Test Item    Value        Reference Range Interpretation Comments

 

             Result 2 (Urine Culture) See Result Comment                        

   



             (test code = Result 2                                        



             (Urine Culture))                                        



[facility] AND WILSB4167-04-38 14:47:00





             Test Item    Value        Reference Range Interpretation Comments

 

             UA Color (test code = UA Color) YELLOW                             

    



[facility] [id number] 14:47:00





             Test Item    Value        Reference Range Interpretation Comments

 

             UA Turbidity (test code = UA CLOUDY                                

 



             Turbidity)                                          



[facility] [id number] 14:47:00





             Test Item    Value        Reference Range Interpretation Comments

 

             UA Spec Grav (test code = UA Spec 1.017 1      1.001-1.035         

      



             Grav)                                               



[facility] [id number] 14:47:00





             Test Item    Value        Reference Range Interpretation Comments

 

             UA pH (test code = UA pH) 5.5 1        5.0-8.0                   



[facility] MOYWP8441-52-95 14:47:00





             Test Item    Value        Reference Range Interpretation Comments

 

             UA Glucose (test code = UA Glucose) NEGATIVE                       

        



[facility] [id number] 14:47:00





             Test Item    Value        Reference Range Interpretation Comments

 

             UA Bili (test code = UA Bili) NEGATIVE                             

  



[facility] [id number] 14:47:00





             Test Item    Value        Reference Range Interpretation Comments

 

             UA Ketones (test code = UA Ketones) NEGATIVE                       

        



[facility] [id number] 14:47:00





             Test Item    Value        Reference Range Interpretation Comments

 

             UA Blood (test code = UA Blood) 1+                                 

    



[facility] [id number] 14:47:00





             Test Item    Value        Reference Range Interpretation Comments

 

             UA Protein (test code = UA Protein) 1+                             

        



[facility] [id number] 14:47:00





             Test Item    Value        Reference Range Interpretation Comments

 

             UA Nitrite (test code = UA Nitrite) POSITIVE                       

        



[facility] [id number] 14:47:00





             Test Item    Value        Reference Range Interpretation Comments

 

             UA Leuk Est (test code = UA Leuk Est) 2+                           

          



[facility] [id number] 14:47:00





             Test Item    Value        Reference Range Interpretation Comments

 

             UA WBC (test code = UA WBC) > OR = 60                              



[facility] [id number] 14:47:00





             Test Item    Value        Reference Range Interpretation Comments

 

             UA RBC (test code = UA RBC) 0-2                                    



[facility] [id number] 14:47:00





             Test Item    Value        Reference Range Interpretation Comments

 

             UA Sq Epi (test code = UA Sq Epi) NONE SEEN                        

      



[facility] [id number] 14:47:00





             Test Item    Value        Reference Range Interpretation Comments

 

             UA Bacteria (test code = UA Bacteria) MANY                         

          



[facility] AND [id number] 14:47:00





             Test Item    Value        Reference Range Interpretation Comments

 

             UA Hyal Cast (test code = UA Hyal NONE SEEN                        

      



             Cast)                                               



[facility] AND AHSEG5913-08-06 14:47:00





             Test Item    Value        Reference Range Interpretation Comments

 

             UA Reflex (test code CULTURE INDICATED -                           



             = UA Reflex) RESULTS TO FOLLOW                           



[facility]2020-08-06 14:47:00





             Test Item    Value        Reference Range Interpretation Comments

 

             U Creat mg/dL (test code = U Creat 114                       

       



             mg/dL)                                              



[place] 633280-08-06 14:47:00





             Test Item    Value        Reference Range Interpretation Comments

 

             U Alb (test code = U Alb) 16.7                                   



[place] 633280-08-06 14:47:00





             Test Item    Value        Reference Range Interpretation Comments

 

             U Alb/Crea (test code = U Alb/Crea) 146                            

        



[facility]2020-08-06 14:47:00





             Test Item    Value        Reference Range Interpretation Comments

 

             Vitamin D, 25-OH, Total (test code = 37                      

         



             Vitamin D, 25-OH, Total)                                        



[place] 793460-08-06 14:47:00





             Test Item    Value        Reference Range Interpretation Comments

 

             U Creat mg/dL (test code = U Creat 114                       

       



             mg/dL)                                              



[place] 793460-08-06 14:47:00





             Test Item    Value        Reference Range Interpretation Comments

 

             U Prot/Creat (test code = U Prot/Creat) 430                  

            



[place] 793460-08-06 14:47:00





             Test Item    Value        Reference Range Interpretation Comments

 

             U Prot/Creat (test code = U 0.430 1      0.021-0.161               



             Prot/Creat)                                         



[place] 793460-08-06 14:47:00





             Test Item    Value        Reference Range Interpretation Comments

 

             U Protein (test code = U Protein) 49           5-24                

      



[place] 793460-08-06 14:47:00





             Test Item    Value        Reference Range Interpretation Comments

 

             Glucose Lvl (test code = Glucose Lvl) 103          65-99           

          



[facility]2020-08-06 14:47:00





             Test Item    Value        Reference Range Interpretation Comments

 

             BUN (test code = BUN) 24           7-25                      



[place] 793460-08-06 14:47:00





             Test Item    Value        Reference Range Interpretation Comments

 

             Creatinine Lvl (test code = Creatinine 1.32         0.50-0.99      

           



             Lvl)                                                



[place] 793460-08-06 14:47:00





             Test Item    Value        Reference Range Interpretation Comments

 

             eGFR NON-AFR. AMERICAN (test code = 43                             

        



             eGFR NON-AFR. AMERICAN)                                        



[place] 793460-08-06 14:47:00





             Test Item    Value        Reference Range Interpretation Comments

 

             eGFR  (test code = eGFR 50                         

            



             )                                        



[place] 793460-08-06 14:47:00





             Test Item    Value        Reference Range Interpretation Comments

 

             B/C Ratio (test code = B/C Ratio) 18           6-22                

      



[place] 793460-08-06 14:47:00





             Test Item    Value        Reference Range Interpretation Comments

 

             Sodium Lvl (test code = Sodium Lvl) 138          135-146           

        



[facility]2020-08-06 14:47:00





             Test Item    Value        Reference Range Interpretation Comments

 

             Potassium Lvl (test code = Potassium 4.4          3.5-5.3          

         



             Lvl)                                                



[facility]2020-08-06 14:47:00





             Test Item    Value        Reference Range Interpretation Comments

 

             Chloride Lvl (test code = Chloride Lvl) 102                  

            



[facility]2020-08-06 14:47:00





             Test Item    Value        Reference Range Interpretation Comments

 

             CO2 (test code = CO2) 30           20-32                     



[place] 793460-08-06 14:47:00





             Test Item    Value        Reference Range Interpretation Comments

 

             Calcium Lvl (test code = Calcium Lvl) 9.4          8.6-10.4        

          



[place] 793460-08-06 14:47:00





             Test Item    Value        Reference Range Interpretation Comments

 

             Phosphorus (test code = Phosphorus) 4.8          2.5-4.5           

        



[place] 793460-08-06 14:47:00





             Test Item    Value        Reference Range Interpretation Comments

 

             Albumin Lvl (test code = Albumin Lvl) 3.9          3.6-5.1         

          



[place] 524490-08-06 14:47:00





             Test Item    Value        Reference Range Interpretation Comments

 

             Plt Count Estimated (test code = DECREASED                         

     



             Plt Count Estimated)                                        



[place] 524490-08-06 14:47:00





             Test Item    Value        Reference Range Interpretation Comments

 

             WBC X 10x3 (test code = WBC X 10x3) 7.2          3.8-10.8          

        



[facility]ennialBjrfgajQMUJYUJFCE5831-70-90 14:47:00





             Test Item    Value        Reference Range Interpretation Comments

 

             RBC X 10x6 (test code = RBC X 10x6) 3.71         3.80-5.10         

        



[facility] CentennialXmntouvPUFVKKEUJK0787-01-54 14:47:00





             Test Item    Value        Reference Range Interpretation Comments

 

             Hgb (test code = Hgb) 10.7         11.7-15.5                 



[place] 524490-08-06 14:47:00





             Test Item    Value        Reference Range Interpretation Comments

 

             Hct (test code = Hct) 33.9         35.0-45.0                 



[facility] CentennialYensleuAKWMUFQNWE8316-87-39 14:47:00





             Test Item    Value        Reference Range Interpretation Comments

 

             MCV (test code = MCV) 91.4         80.0-100.0                



[facility] CentennialAkfngtzBALEHHZQSG9158-59-61 14:47:00





             Test Item    Value        Reference Range Interpretation Comments

 

             MCH (test code = MCH) 28.8 pg      27.0-33.0                 



[facility] CentennialAwdwzupRPCJZMTRZX1427-09-86 14:47:00





             Test Item    Value        Reference Range Interpretation Comments

 

             MCHC (test code = MCHC) 31.6         32.0-36.0                 



[facility] CentennialXuwhtekGVOCPXFQUV3913-82-37 14:47:00





             Test Item    Value        Reference Range Interpretation Comments

 

             RDW (test code = RDW) 13.9         11.0-15.0                 



[facility] CentennialPgfwbzfADPZHSCBJR4809-54-03 14:47:00





             Test Item    Value        Reference Range Interpretation Comments

 

             Platelet (test code = Platelet) 110          140-400               

    



[facility] CentennialZxaluzwCITRBXYUFA9131-16-52 14:47:00





             Test Item    Value        Reference Range Interpretation Comments

 

             MPV (test code = MPV) 11.7         7.5-12.5                  



[facility] CentennialPocpxjnOYKYEHAOIL8371-79-37 14:47:00





             Test Item    Value        Reference Range Interpretation Comments

 

             Neutrophils # (test code = Neutrophils 5414         2171-3159      

           



             #)                                                  



[facility] CentennialQhvnvosXRQJBDTFLT4981-96-96 14:47:00





             Test Item    Value        Reference Range Interpretation Comments

 

             Lymphocytes # (test code = Lymphocytes 1152         850-3900       

           



             #)                                                  



[facility] CentennialCvfyslqHABUMYOSPN5493-24-22 14:47:00





             Test Item    Value        Reference Range Interpretation Comments

 

             Monocytes # (test code = Monocytes #) 461          200-950         

          



[facility] ChildrenHegoipmDBXJBIAUFL9648-50-65 14:47:00





             Test Item    Value        Reference Range Interpretation Comments

 

             Eosinophils # (test code = Eosinophils 122                   

           



             #)                                                  



[facility] HealthFnccctuXOYWHYMRAW4592-08-89 14:47:00





             Test Item    Value        Reference Range Interpretation Comments

 

             Basophils # (test code 50           See_Comment                [Aut

omated message] The



             = Basophils #)                                        system which 

generated



                                                                 this result tra

nsmitted



                                                                 reference range

: <=200.



                                                                 The reference r

cheyenne was



                                                                 not used to int

erpret



                                                                 this result as



                                                                 normal/abnormal

.



[facility] HealthPenwbsxLEYNQVLDMZ0600-02-55 14:47:00





             Test Item    Value        Reference Range Interpretation Comments

 

             Segs (test code = Segs) 75.2                                   



[facility] HealthBgvlooaTONUHZHZRX0035-20-69 14:47:00





             Test Item    Value        Reference Range Interpretation Comments

 

             Lymphocytes (test code = Lymphocytes) 16.0                         

          



[facility] HealthFoqafclEZIBDHHDRM6561-35-26 14:47:00





             Test Item    Value        Reference Range Interpretation Comments

 

             Monocytes (test code = Monocytes) 6.4                              

      



[facility] FallsDzuzkmyWRRXHHMELD0690-89-85 14:47:00





             Test Item    Value        Reference Range Interpretation Comments

 

             Eosinophils (test code = Eosinophils) 1.7                          

          



[facility] ChildrenZqpimbwPAAKNGXVSU9727-50-86 14:47:00





             Test Item    Value        Reference Range Interpretation Comments

 

             Basophils (test code = Basophils) 0.7                              

      



[facility] [facility] [id number] 14:47:00





             Test Item    Value        Reference Range Interpretation Comments

 

             Result 2 (Urine Culture) See Result Comment                        

   



             (test code = Result 2                                        



             (Urine Culture))                                        



[facility] AND [id number] 14:47:00





             Test Item    Value        Reference Range Interpretation Comments

 

             UA Color (test code = UA Color) YELLOW                             

    



[facility] AND [id number] 14:47:00





             Test Item    Value        Reference Range Interpretation Comments

 

             UA Turbidity (test code = UA CLOUDY                                

 



             Turbidity)                                          



[facility] AND [id number] 14:47:00





             Test Item    Value        Reference Range Interpretation Comments

 

             UA Spec Grav (test code = UA Spec 1.017 1      1.001-1.035         

      



             Grav)                                               



[facility] AND [id number] 14:47:00





             Test Item    Value        Reference Range Interpretation Comments

 

             UA pH (test code = UA pH) 5.5 1        5.0-8.0                   



[facility] [id number] 14:47:00





             Test Item    Value        Reference Range Interpretation Comments

 

             UA Glucose (test code = UA Glucose) NEGATIVE                       

        



[facility] [id number] 14:47:00





             Test Item    Value        Reference Range Interpretation Comments

 

             UA Bili (test code = UA Bili) NEGATIVE                             

  



[facility] [id number] 14:47:00





             Test Item    Value        Reference Range Interpretation Comments

 

             UA Ketones (test code = UA Ketones) NEGATIVE                       

        



[facility] [id number] 14:47:00





             Test Item    Value        Reference Range Interpretation Comments

 

             UA Blood (test code = UA Blood) 1+                                 

    



[facility] [id number] 14:47:00





             Test Item    Value        Reference Range Interpretation Comments

 

             UA Protein (test code = UA Protein) 1+                             

        



[facility] [id number] 14:47:00





             Test Item    Value        Reference Range Interpretation Comments

 

             UA Nitrite (test code = UA Nitrite) POSITIVE                       

        



[facility] [id number] 14:47:00





             Test Item    Value        Reference Range Interpretation Comments

 

             UA Leuk Est (test code = UA Leuk Est) 2+                           

          



[facility] [id number] 14:47:00





             Test Item    Value        Reference Range Interpretation Comments

 

             UA WBC (test code = UA WBC) > OR = 60                              



[facility] [id number] 14:47:00





             Test Item    Value        Reference Range Interpretation Comments

 

             UA RBC (test code = UA RBC) 0-2                                    



[facility] [id number] 14:47:00





             Test Item    Value        Reference Range Interpretation Comments

 

             UA Sq Epi (test code = UA Sq Epi) NONE SEEN                        

      



[facility] [id number] 14:47:00





             Test Item    Value        Reference Range Interpretation Comments

 

             UA Bacteria (test code = UA Bacteria) MANY                         

          



[facility] [id number] 14:47:00





             Test Item    Value        Reference Range Interpretation Comments

 

             UA Hyal Cast (test code = UA Hyal NONE SEEN                        

      



             Cast)                                               



[facility] [id number] 14:47:00





             Test Item    Value        Reference Range Interpretation Comments

 

             UA Reflex (test code CULTURE INDICATED -                           



             = UA Reflex) RESULTS TO FOLLOW                           



[facility] [id number] 14:47:00





             Test Item    Value        Reference Range Interpretation Comments

 

             U Creat mg/dL (test code = U Creat 114                       

       



             mg/dL)                                              



[facility]2020-08-06 14:47:00





             Test Item    Value        Reference Range Interpretation Comments

 

             U Alb (test code = U Alb) 16.7                                   



[place] 633280-08-06 14:47:00





             Test Item    Value        Reference Range Interpretation Comments

 

             U Alb/Crea (test code = U Alb/Crea) 146                            

        



[place] 793460-08-06 14:47:00





             Test Item    Value        Reference Range Interpretation Comments

 

             Vitamin D, 25-OH, Total (test code = 37                      

         



             Vitamin D, 25-OH, Total)                                        



[place] 793460-08-06 14:47:00





             Test Item    Value        Reference Range Interpretation Comments

 

             U Creat mg/dL (test code = U Creat 114                       

       



             mg/dL)                                              



[place] [address]-08-06 14:47:00





             Test Item    Value        Reference Range Interpretation Comments

 

             U Prot/Creat (test code = U Prot/Creat) 430                  

            



[place] 793460-08-06 14:47:00





             Test Item    Value        Reference Range Interpretation Comments

 

             U Prot/Creat (test code = U 0.430 1      0.021-0.161               



             Prot/Creat)                                         



[facility]2020-08-06 14:47:00





             Test Item    Value        Reference Range Interpretation Comments

 

             U Protein (test code = U Protein) 49           5-24                

      



[place] 793460-08-06 14:47:00





             Test Item    Value        Reference Range Interpretation Comments

 

             Glucose Lvl (test code = Glucose Lvl) 103          65-99           

          



[place] 793460-08-06 14:47:00





             Test Item    Value        Reference Range Interpretation Comments

 

             BUN (test code = BUN) 24           7-25                      



[place] 793460-08-06 14:47:00





             Test Item    Value        Reference Range Interpretation Comments

 

             Creatinine Lvl (test code = Creatinine 1.32         0.50-0.99      

           



             Lvl)                                                



[place] 793460-08-06 14:47:00





             Test Item    Value        Reference Range Interpretation Comments

 

             eGFR NON-AFR. AMERICAN (test code = 43                             

        



             eGFR NON-AFR. AMERICAN)                                        



[place] 793460-08-06 14:47:00





             Test Item    Value        Reference Range Interpretation Comments

 

             eGFR  (test code = eGFR 50                         

            



             )                                        



[place] 793460-08-06 14:47:00





             Test Item    Value        Reference Range Interpretation Comments

 

             B/C Ratio (test code = B/C Ratio) 18           6-22                

      



[place] 793460-08-06 14:47:00





             Test Item    Value        Reference Range Interpretation Comments

 

             Sodium Lvl (test code = Sodium Lvl) 138          135-146           

        



[place] 793460-08-06 14:47:00





             Test Item    Value        Reference Range Interpretation Comments

 

             Potassium Lvl (test code = Potassium 4.4          3.5-5.3          

         



             Lvl)                                                



[place] 793460-08-06 14:47:00





             Test Item    Value        Reference Range Interpretation Comments

 

             Chloride Lvl (test code = Chloride Lvl) 102                  

            



[place] 793460-08-06 14:47:00





             Test Item    Value        Reference Range Interpretation Comments

 

             CO2 (test code = CO2) 30           20-32                     



[place] 793460-08-06 14:47:00





             Test Item    Value        Reference Range Interpretation Comments

 

             Calcium Lvl (test code = Calcium Lvl) 9.4          8.6-10.4        

          



[place] 793460-08-06 14:47:00





             Test Item    Value        Reference Range Interpretation Comments

 

             Phosphorus (test code = Phosphorus) 4.8          2.5-4.5           

        



[place] 793460-08-06 14:47:00





             Test Item    Value        Reference Range Interpretation Comments

 

             Albumin Lvl (test code = Albumin Lvl) 3.9          3.6-5.1         

          



[place] 524490-08-06 14:47:00





             Test Item    Value        Reference Range Interpretation Comments

 

             Plt Count Estimated (test code = DECREASED                         

     



             Plt Count Estimated)                                        



[place] 524490-08-06 14:47:00





             Test Item    Value        Reference Range Interpretation Comments

 

             WBC X 10x3 (test code = WBC X 10x3) 7.2          3.8-10.8          

        



[place] [address]-08-06 14:47:00





             Test Item    Value        Reference Range Interpretation Comments

 

             RBC X 10x6 (test code = RBC X 10x6) 3.71         3.80-5.10         

        



[place] [address]-08-06 14:47:00





             Test Item    Value        Reference Range Interpretation Comments

 

             Hgb (test code = Hgb) 10.7         11.7-15.5                 



[place] [address]-08-06 14:47:00





             Test Item    Value        Reference Range Interpretation Comments

 

             Hct (test code = Hct) 33.9         35.0-45.0                 



[place] 524490-08-06 14:47:00





             Test Item    Value        Reference Range Interpretation Comments

 

             MCV (test code = MCV) 91.4         80.0-100.0                



[facility]qjlsdyYAXPEDONXU4960-68-38 14:47:00





             Test Item    Value        Reference Range Interpretation Comments

 

             MCH (test code = MCH) 28.8 pg      27.0-33.0                 



[facility]ennialUrnocykVKBYOJNGCN5865-65-36 14:47:00





             Test Item    Value        Reference Range Interpretation Comments

 

             MCHC (test code = MCHC) 31.6         32.0-36.0                 



[facility]ennialCvrtdqzSVVOXZMNCJ7991-19-13 14:47:00





             Test Item    Value        Reference Range Interpretation Comments

 

             RDW (test code = RDW) 13.9         11.0-15.0                 



[facility] CentennialJujellfESMKKBDCLE4206-76-44 14:47:00





             Test Item    Value        Reference Range Interpretation Comments

 

             Platelet (test code = Platelet) 110          140-400               

    



[facility] CentennialSsxlfuyQUQONDBJPC9085-92-26 14:47:00





             Test Item    Value        Reference Range Interpretation Comments

 

             MPV (test code = MPV) 11.7         7.5-12.5                  



[facility]ennialCfuxqapBNTOJTDRWF7981-61-39 14:47:00





             Test Item    Value        Reference Range Interpretation Comments

 

             Neutrophils # (test code = Neutrophils 5414         6945-0563      

           



             #)                                                  



[facility] CentennialEbttjbnHITEKTHIRL5967-30-09 14:47:00





             Test Item    Value        Reference Range Interpretation Comments

 

             Lymphocytes # (test code = Lymphocytes 1152         850-3900       

           



             #)                                                  



[facility] CentennialRuxymojVPPGEULFJK6495-09-70 14:47:00





             Test Item    Value        Reference Range Interpretation Comments

 

             Monocytes # (test code = Monocytes #) 461          200-950         

          



[facility] CentennialPremuxySZLKTFDZEF1300-97-69 14:47:00





             Test Item    Value        Reference Range Interpretation Comments

 

             Eosinophils # (test code = Eosinophils 122                   

           



             #)                                                  



[facility] CentennialLrbsdziKFSLCAUGLW9413-43-00 14:47:00





             Test Item    Value        Reference Range Interpretation Comments

 

             Basophils # (test code 50           See_Comment                [Aut

omated message] The



             = Basophils #)                                        system which 

generated



                                                                 this result tra

nsmitted



                                                                 reference range

: <=200.



                                                                 The reference r

cheyenne was



                                                                 not used to int

erpret



                                                                 this result as



                                                                 normal/abnormal

.



[facility] FallsQrktproTXLRAXKEVD2671-75-46 14:47:00





             Test Item    Value        Reference Range Interpretation Comments

 

             Segs (test code = Segs) 75.2                                   



[facility]47:[facility]





             Test Item    Value        Reference Range Interpretation Comments

 

             Lymphocytes (test code = Lymphocytes) 16.0                         

          



[facility] HealthIokurngXMYKNWQPMD3390-81-82 14:47:00





             Test Item    Value        Reference Range Interpretation Comments

 

             Monocytes (test code = Monocytes) 6.4                              

      



[facility] FallsUdlckxjGPYYTLYZDV1198-46-16 14:47:00





             Test Item    Value        Reference Range Interpretation Comments

 

             Eosinophils (test code = Eosinophils) 1.7                          

          



[facility] FallsRqczyunQLXXSWCMHO7413-78-02 14:47:00





             Test Item    Value        Reference Range Interpretation Comments

 

             Basophils (test code = Basophils) 0.7                              

      



[facility] [id number] 14:47:00





             Test Item    Value        Reference Range Interpretation Comments

 

             Result 2 (Urine Culture) See Result Comment                        

   



             (test code = Result 2                                        



             (Urine Culture))                                        



[facility] AND [id number] 14:47:00





             Test Item    Value        Reference Range Interpretation Comments

 

             UA Color (test code = UA Color) YELLOW                             

    



[facility] AND [id number] 14:47:00





             Test Item    Value        Reference Range Interpretation Comments

 

             UA Turbidity (test code = UA CLOUDY                                

 



             Turbidity)                                          



[facility] AND [id number] 14:47:00





             Test Item    Value        Reference Range Interpretation Comments

 

             UA Spec Grav (test code = UA Spec 1.017 1      1.001-1.035         

      



             Grav)                                               



[facility] AND [id number] 14:47:00





             Test Item    Value        Reference Range Interpretation Comments

 

             UA pH (test code = UA pH) 5.5 1        5.0-8.0                   



[facility] AND [id number] 14:47:00





             Test Item    Value        Reference Range Interpretation Comments

 

             UA Glucose (test code = UA Glucose) NEGATIVE                       

        



[facility] AND [id number] 14:47:00





             Test Item    Value        Reference Range Interpretation Comments

 

             UA Bili (test code = UA Bili) NEGATIVE                             

  



[facility] AND [id number] 14:47:00





             Test Item    Value        Reference Range Interpretation Comments

 

             UA Ketones (test code = UA Ketones) NEGATIVE                       

        



[facility] AND [id number] 14:47:00





             Test Item    Value        Reference Range Interpretation Comments

 

             UA Blood (test code = UA Blood) 1+                                 

    



[facility] [id number] 14:47:00





             Test Item    Value        Reference Range Interpretation Comments

 

             UA Protein (test code = UA Protein) 1+                             

        



[facility] [id number] 14:47:00





             Test Item    Value        Reference Range Interpretation Comments

 

             UA Nitrite (test code = UA Nitrite) POSITIVE                       

        



[facility] [id number] 14:47:00





             Test Item    Value        Reference Range Interpretation Comments

 

             UA Leuk Est (test code = UA Leuk Est) 2+                           

          



[facility] [id number] 14:47:00





             Test Item    Value        Reference Range Interpretation Comments

 

             UA WBC (test code = UA WBC) > OR = 60                              



[facility] [id number] 14:47:00





             Test Item    Value        Reference Range Interpretation Comments

 

             UA RBC (test code = UA RBC) 0-2                                    



[facility] [id number] 14:47:00





             Test Item    Value        Reference Range Interpretation Comments

 

             UA Sq Epi (test code = UA Sq Epi) NONE SEEN                        

      



[facility] [id number] 14:47:00





             Test Item    Value        Reference Range Interpretation Comments

 

             UA Bacteria (test code = UA Bacteria) MANY                         

          



[facility] [id number] 14:47:00





             Test Item    Value        Reference Range Interpretation Comments

 

             UA Hyal Cast (test code = UA Hyal NONE SEEN                        

      



             Cast)                                               



[facility] AND [id number] 14:47:00





             Test Item    Value        Reference Range Interpretation Comments

 

             UA Reflex (test code CULTURE INDICATED -                           



             = UA Reflex) RESULTS TO FOLLOW                           



[facility] [id number] 14:47:00





             Test Item    Value        Reference Range Interpretation Comments

 

             U Creat mg/dL (test code = U Creat 114                       

       



             mg/dL)                                              



[facility] [id number] 14:47:00





             Test Item    Value        Reference Range Interpretation Comments

 

             U Alb (test code = U Alb) 16.7                                   



[facility] [id number] 14:47:00





             Test Item    Value        Reference Range Interpretation Comments

 

             U Alb/Crea (test code = U Alb/Crea) 146                            

        



[facility] [id number] 14:47:00





             Test Item    Value        Reference Range Interpretation Comments

 

             Vitamin D, 25-OH, Total (test code = 37                      

         



             Vitamin D, 25-OH, Total)                                        



[facility]2020-08-06 14:47:00





             Test Item    Value        Reference Range Interpretation Comments

 

             U Creat mg/dL (test code = U Creat 114                       

       



             mg/dL)                                              



[place] 793460-08-06 14:47:00





             Test Item    Value        Reference Range Interpretation Comments

 

             U Prot/Creat (test code = U Prot/Creat) 430                  

            



[place] 793460-08-06 14:47:00





             Test Item    Value        Reference Range Interpretation Comments

 

             U Prot/Creat (test code = U 0.430 1      0.021-0.161               



             Prot/Creat)                                         



[place] 793460-08-06 14:47:00





             Test Item    Value        Reference Range Interpretation Comments

 

             U Protein (test code = U Protein) 49           5-24                

      



[place] 793460-08-06 14:47:00





             Test Item    Value        Reference Range Interpretation Comments

 

             Glucose Lvl (test code = Glucose Lvl) 103          65-99           

          



[place] 793460-08-06 14:47:00





             Test Item    Value        Reference Range Interpretation Comments

 

             BUN (test code = BUN) 24           7-25                      



[place] 793460-08-06 14:47:00





             Test Item    Value        Reference Range Interpretation Comments

 

             Creatinine Lvl (test code = Creatinine 1.32         0.50-0.99      

           



             Lvl)                                                



[facility]2020-08-06 14:47:00





             Test Item    Value        Reference Range Interpretation Comments

 

             eGFR NON-AFR. AMERICAN (test code = 43                             

        



             eGFR NON-AFR. AMERICAN)                                        



[facility]2020-08-06 14:47:00





             Test Item    Value        Reference Range Interpretation Comments

 

             eGFR  (test code = eGFR 50                         

            



             )                                        



[place] 793460-08-06 14:47:00





             Test Item    Value        Reference Range Interpretation Comments

 

             B/C Ratio (test code = B/C Ratio) 18           6-22                

      



[place] 793460-08-06 14:47:00





             Test Item    Value        Reference Range Interpretation Comments

 

             Sodium Lvl (test code = Sodium Lvl) 138          135-146           

        



[place] 793460-08-06 14:47:00





             Test Item    Value        Reference Range Interpretation Comments

 

             Potassium Lvl (test code = Potassium 4.4          3.5-5.3          

         



             Lvl)                                                



[place] 793460-08-06 14:47:00





             Test Item    Value        Reference Range Interpretation Comments

 

             Chloride Lvl (test code = Chloride Lvl) 102                  

            



[place] 793460-08-06 14:47:00





             Test Item    Value        Reference Range Interpretation Comments

 

             CO2 (test code = CO2) 30           20-32                     



[place] 99259-08-06 14:47:00





             Test Item    Value        Reference Range Interpretation Comments

 

             Calcium Lvl (test code = Calcium Lvl) 9.4          8.6-10.4        

          



[place] 99259-08-06 14:47:00





             Test Item    Value        Reference Range Interpretation Comments

 

             Phosphorus (test code = Phosphorus) 4.8          2.5-4.5           

        



[place] 793460-08-06 14:47:00





             Test Item    Value        Reference Range Interpretation Comments

 

             Albumin Lvl (test code = Albumin Lvl) 3.9          3.6-5.1         

          



[place] [address]14-08-06 14:47:00





             Test Item    Value        Reference Range Interpretation Comments

 

             Plt Count Estimated (test code = DECREASED                         

     



             Plt Count Estimated)                                        



[place] [address]14-08-06 14:47:00





             Test Item    Value        Reference Range Interpretation Comments

 

             WBC X 10x3 (test code = WBC X 10x3) 7.2          3.8-10.8          

        



[place] [address]14-08-06 14:47:00





             Test Item    Value        Reference Range Interpretation Comments

 

             RBC X 10x6 (test code = RBC X 10x6) 3.71         3.80-5.10         

        



[place] [address]14-08-06 14:47:00





             Test Item    Value        Reference Range Interpretation Comments

 

             Hgb (test code = Hgb) 10.7         11.7-15.5                 



[place] [address]-08-06 14:47:00





             Test Item    Value        Reference Range Interpretation Comments

 

             Hct (test code = Hct) 33.9         35.0-45.0                 



[place] [address]-08-06 14:47:00





             Test Item    Value        Reference Range Interpretation Comments

 

             MCV (test code = MCV) 91.4         80.0-100.0                



[place] [address]-08-06 14:47:00





             Test Item    Value        Reference Range Interpretation Comments

 

             MCH (test code = MCH) 28.8 pg      27.0-33.0                 



[place] [address]-08-06 14:47:00





             Test Item    Value        Reference Range Interpretation Comments

 

             MCHC (test code = MCHC) 31.6         32.0-36.0                 



[facility]gigwqFNEYBWDZKR7139-36-21 14:47:00





             Test Item    Value        Reference Range Interpretation Comments

 

             RDW (test code = RDW) 13.9         11.0-15.0                 



[place] 524490-08-06 14:47:00





             Test Item    Value        Reference Range Interpretation Comments

 

             Platelet (test code = Platelet) 110          140-400               

    



[facility] CentennialDisesfoULLHDUJQBZ1235-34-92 14:47:00





             Test Item    Value        Reference Range Interpretation Comments

 

             MPV (test code = MPV) 11.7         7.5-12.5                  



[facility] CentennialFmsybuoITIHSPKZCH3008-43-87 14:47:00





             Test Item    Value        Reference Range Interpretation Comments

 

             Neutrophils # (test code = Neutrophils 5414         3635-8038      

           



             #)                                                  



[facility] CentennialYkmfignZYDCAOOLUK3700-51-34 14:47:00





             Test Item    Value        Reference Range Interpretation Comments

 

             Lymphocytes # (test code = Lymphocytes 1152         850-3900       

           



             #)                                                  



[facility] CentennialGlfgqarBHWOWIYEPN6330-37-77 14:47:00





             Test Item    Value        Reference Range Interpretation Comments

 

             Monocytes # (test code = Monocytes #) 461          200-950         

          



[facility] CentennialOievtrkZTHGTDVRYW5025-40-90 14:47:00





             Test Item    Value        Reference Range Interpretation Comments

 

             Eosinophils # (test code = Eosinophils 122                   

           



             #)                                                  



[facility] CentennialFyreksnEOBDFREYCE3269-77-14 14:47:00





             Test Item    Value        Reference Range Interpretation Comments

 

             Basophils # (test code 50           See_Comment                [Aut

omated message] The



             = Basophils #)                                        system which 

generated



                                                                 this result tra

nsmitted



                                                                 reference range

: <=200.



                                                                 The reference r

cheyenne was



                                                                 not used to int

erpret



                                                                 this result as



                                                                 normal/abnormal

.



[facility] CentennialZjgyqecRTRAADYSQM6867-63-20 14:47:00





             Test Item    Value        Reference Range Interpretation Comments

 

             Segs (test code = Segs) 75.2                                   



[facility] CentennialAqkljxcCVZMAUGYTQ6346-59-97 14:47:00





             Test Item    Value        Reference Range Interpretation Comments

 

             Lymphocytes (test code = Lymphocytes) 16.0                         

          



[place] 524490-08-06 14:47:00





             Test Item    Value        Reference Range Interpretation Comments

 

             Monocytes (test code = Monocytes) 6.4                              

      



[facility] CentennialPpaboigKQHMMILIWA7858-97-20 14:47:00





             Test Item    Value        Reference Range Interpretation Comments

 

             Eosinophils (test code = Eosinophils) 1.7                          

          



[facility]tytgddQOMQWCEQWH4730[facility]71-63 14:47:00





             Test Item    Value        Reference Range Interpretation Comments

 

             Basophils (test code = Basophils) 0.7                              

      



[facility] [id number] 14:47:00





             Test Item    Value        Reference Range Interpretation Comments

 

             Result 2 (Urine Culture) See Result Comment                        

   



             (test code = Result 2                                        



             (Urine Culture))                                        



[facility] AND [id number] 14:47:00





             Test Item    Value        Reference Range Interpretation Comments

 

             UA Color (test code = UA Color) YELLOW                             

    



[facility] AND [id number] 14:47:00





             Test Item    Value        Reference Range Interpretation Comments

 

             UA Turbidity (test code = UA CLOUDY                                

 



             Turbidity)                                          



[facility] AND [id number] 14:47:00





             Test Item    Value        Reference Range Interpretation Comments

 

             UA Spec Grav (test code = UA Spec 1.017 1      1.001-1.035         

      



             Grav)                                               



[facility] AND [id number] 14:47:00





             Test Item    Value        Reference Range Interpretation Comments

 

             UA pH (test code = UA pH) 5.5 1        5.0-8.0                   



[facility] AND [id number] 14:47:00





             Test Item    Value        Reference Range Interpretation Comments

 

             UA Glucose (test code = UA Glucose) NEGATIVE                       

        



[facility] AND [id number] 14:47:00





             Test Item    Value        Reference Range Interpretation Comments

 

             UA Bili (test code = UA Bili) NEGATIVE                             

  



[facility] AND [id number] 14:47:00





             Test Item    Value        Reference Range Interpretation Comments

 

             UA Ketones (test code = UA Ketones) NEGATIVE                       

        



[facility] [id number] 14:47:00





             Test Item    Value        Reference Range Interpretation Comments

 

             UA Blood (test code = UA Blood) 1+                                 

    



[facility] AND [id number] 14:47:00





             Test Item    Value        Reference Range Interpretation Comments

 

             UA Protein (test code = UA Protein) 1+                             

        



[facility] [id number] 14:47:00





             Test Item    Value        Reference Range Interpretation Comments

 

             UA Nitrite (test code = UA Nitrite) POSITIVE                       

        



[facility] [id number] 14:47:00





             Test Item    Value        Reference Range Interpretation Comments

 

             UA Leuk Est (test code = UA Leuk Est) 2+                           

          



[facility] [id number] 14:47:00





             Test Item    Value        Reference Range Interpretation Comments

 

             UA WBC (test code = UA WBC) > OR = 60                              



[facility] [id number] 14:47:00





             Test Item    Value        Reference Range Interpretation Comments

 

             UA RBC (test code = UA RBC) 0-2                                    



[facility] [id number] 14:47:00





             Test Item    Value        Reference Range Interpretation Comments

 

             UA Sq Epi (test code = UA Sq Epi) NONE SEEN                        

      



[facility] [id number] 14:47:00





             Test Item    Value        Reference Range Interpretation Comments

 

             UA Bacteria (test code = UA Bacteria) MANY                         

          



[facility] [id number] 14:47:00





             Test Item    Value        Reference Range Interpretation Comments

 

             UA Hyal Cast (test code = UA Hyal NONE SEEN                        

      



             Cast)                                               



[facility] AND [id number] 14:47:00





             Test Item    Value        Reference Range Interpretation Comments

 

             UA Reflex (test code CULTURE INDICATED -                           



             = UA Reflex) RESULTS TO FOLLOW                           



[facility]-08-[facility] 14:47:00





             Test Item    Value        Reference Range Interpretation Comments

 

             U Creat mg/dL (test code = U Creat 114                       

       



             mg/dL)                                              



[facility] [id number] 14:47:00





             Test Item    Value        Reference Range Interpretation Comments

 

             U Alb (test code = U Alb) 16.7                                   



[facility] [id number] 14:47:00





             Test Item    Value        Reference Range Interpretation Comments

 

             U Alb/Crea (test code = U Alb/Crea) 146                            

        



[facility] PROTEIN ELECTROPHORESIS-24HR [id number] 08:01:00





             Test Item    Value        Reference Range Interpretation Comments

 

             CREATININE, 24 HOUR 1.45 g/24 h  0.50-2.15                 



             URINE (test code =                                        



             91300854)                                           

 

             PROTEIN/CREATININE 292 mg/g creat < OR = 114   H            



             RATION (test code =                                        



             95626211)                                           

 

             PROTEIN, TOTAL 24 HR  mg/24 h  <150         H            TEST

 PERFORMED



             (test code = 13670272)                                        AT:QU

[facility]



                                                                 [facility]

IN



                                                                 [address] [place], [place] [address], [place]



                                                                 [id number] FELIX MD

 

             ALBUMIN (test code = 35 %                                   



             91487194)                                           

 

             ALPHA-1-GLOBULINS (test 10 %                                   



             code = 07277571)                                        

 

             ALPHA-2-GLOBULINS (test 12 %                                   



             code = 98837228)                                        

 

             BETA GLOBULINS (test 25 %                                   



             code = 45214949)                                        

 

             GAMMA GLOBULINS (test 18 %                                   



             code = 08487131)                                        

 

             INTERPRETATION (test                                        Albumin

 and



             code = 66969868)                                        various aba

bulin



                                                                 fractions



                                                                 detected on



                                                                 proteinelectrop

ho



                                                                 resis. No



                                                                 abnormal protei

n



                                                                 bands



                                                                 (Bence-Jonespro

te



                                                                 inuria)



                                                                 detected.TEST



                                                                 PERFORMED



                                                                 AT:[facility]



                                                                 [facility]

IN



                                                                 [facility].RIDDHI ALVAREZ



                                                                 [address]CDZPAELVIRA FELIX MD



NEUTROPHIL CYTOPLASMIC [id number]--[id number] 05:19:00





             Test Item    Value        Reference Range Interpretation Comments

 

             ANCA SCREEN (test NEGATIVE     NEGATIVE                  ANCA Scree

n includes



             code = 18876388)                                        evaluation 

for p-ANCA,



                                                                 c-ANCA andatypi

tayla p-ANCA.



                                                                 A positive ANCA

 screen



                                                                 reflexes to tit

erand



                                                                 pattern(s), e.g

.,



                                                                 cytoplasmic pat

tern



                                                                 (c-ANCA),perinu

clear



                                                                 pattern (p-ANCA

), or



                                                                 atypical p-ANCA



                                                                 pattern.c-ANCA 

and p-ANCA



                                                                 are observed in

 vasculitis,



                                                                 whereasatypical

 p-ANCA is



                                                                 observed in IBD



                                                                 (Inflammatory



                                                                 BowelDisease). 

Atypical



                                                                 p-ANCA is detec

kolby in about



                                                                 55% to 80% ofpa

tients with



                                                                 ulcerative coli

tis but only



                                                                 5% to 25% ofpat

ients with



                                                                 Crohn's disease

.TEST



                                                                 PERFORMED AT:[facility]



                                                                 [facility]



                                                                 [id number] [facility] [place] [place]



                                                                 [id number] MARIEE MD,PHD

,RAMILA



COMPREHENSIVE METABOLIC [id number] 06:25:00





             Test Item    Value        Reference Range Interpretation Comments

 

             GLUCOSE (test code = 06D) 115 mg/dL           H            

 

             SODIUM (test code = 01A) 137 mmol/L   136-145                   

 

             POTASSIUM (test code = 01B) 3.3 mmol/L   3.6-5.1      L            

 

             CHLORIDE (test code = 04A) 97 mmol/L           L            

 

             CO2 (test code = 02A) 32 mmol/L    22-32                     

 

             ANION GAP (test code = ANG) 11.3 mmol/L                            

 

             BUN (test code = 05D) 36 mg/dL     7-18         H            

 

             CREATININE (test code = 03E) 1.4 mg/dL    0.4-1.1      H           

 

 

             BUN/CREA (test code = BCR) 25           12-20        H            

 

             CALCIUM (test code = 09D) 8.8 mg/dL    8.3-9.5                   

 

             BILI TOTAL (test code = 11A) 1.2 mg/dL    0.2-1.0      H           

 

 

             PROTEIN (test code = 07D) 6.5 g/dL     6.4-8.2                   

 

             ALBUMIN (test code = 08D) 2.9 g/dL     3.5-4.8      L            

 

             GLOBULIN (test code = GLB) 3.6 g/dL     1.5-3.8                   

 

             ALB/GLOB (test code = AGRR) 0.8          1.0-2.6      L            

 

             ALK PHOS (test code = 35A) 97 IU/L                          

 

             AST (test code = 30A) 15 IU/L      <=42                      

 

             ALT (test code = 31A) 35 IU/L      <=78                      



CBC (INCLUDES AUTOMATED DIFFERENTIAL)2019 06:06:00





             Test Item    Value        Reference Range Interpretation Comments

 

             WBC (test code = WBC) 6.9 10\S\3/uL 4.5-11.0                  

 

             RBC (test code = RBC) 3.56 10\S\6/uL 4.20-5.60    L            

 

             HGB (test code = HBG) 10.4 g/dL    12.0-15.5    L            

 

             HCT (test code = HCT) 32.1 %       35.0-44.0    L            

 

             MCV (test code = MCV) 90.2 fL      81.0-99.0                 

 

             MCH (test code = MCH) 29.2 pg      27.0-31.0                 

 

             MCHC (test code = MCHC) 32.4 g/dL    32.0-36.0                 

 

             RDW (test code = RDW) 15.0 %       11.5-14.5    H            

 

             PLT (test code = PLT) 158 10\S\3/uL 130-400                   

 

             MPV (test code = MPV) 10.7 fL      9.4-12.4                  

 

             NEUTROP # (test code = NE#) 4.4 10\S\3/uL 1.6-8.0                  

 

 

             LYMPH # (test code = LY#) 1.8 10\S\3/uL 1.1-3.5                   

 

             MONOCYTE # (test code = MO#) 0.5 10\S\3/uL 0.0-1.1                 

  

 

             EOSINOPH # (test code = EO#) 0.2 10\S\3/uL 0.0-0.7                 

  

 

             BASOPHIL # (test code = BA#) 0.0 10\S\3/uL 0.0-0.3                 

  

 

             IG # (test code = IG#) 0.03 10\S\3/uL 0.00-0.06                 

 

             NRBC # (test code = NRBC#) 0.00 10\S\3/uL 0.00-0.01                

 

 

             NEUTROPH % (test code = NE%) 64.0 %       35.0-73.0                

 

 

             LYMPH % (test code = LY%) 26.1 %       20.0-55.0                 

 

             MONO % (test code = MO%) 6.5 %        2.5-10.0                  

 

             EOSINOPH % (test code = EO%) 2.6 %        0.0-5.0                  

 

 

             BASOPHIL % (test code = BA%) 0.4 %        0.0-2.0                  

 

 

             IG % (test code = IG%) 0.4 %        0.0-0.8                   

 

             NRBC% (test code = NRBC%) 0.0 %        0.0-0.2                   

 

             MANDIFF (test code = MDIFF) NO           NO                        

 

             RBC MORPH (test code = RBCMOR)              NORMAL                 

   



URINE [id number] 09:53:00





             Test Item    Value        Reference Range Interpretation Comments

 

             Isolate 1 (test code = Proteus mirabilis              A            



             ISO1)                                               

 

             ampicillin (test code = am)  ug/mL                    S            

 

             ampicillin/sulbactam (test  ug/mL                    S            



             code = ams)                                         

 

             piperacillin/tazobactam  ug/mL                    S            



             (test code = tzp)                                        

 

             ceftazidime (test code =  ug/mL                    S            



             jeannine)                                                

 

             ceftriaxone1 (test code =  ug/mL                    S            



             ctr)                                                

 

             cefepime (test code = fep)  ug/mL                    S            

 

             aztreonam (test code = azm)  ug/mL                    S            

 

             ertapenem (test code = etp)  ug/mL                    S            

 

             meropenem (test code = mem)  ug/mL                    S            

 

             gentamicin (test code = gm)  ug/mL                    S            

 

             tobramycin (test code =  ug/mL                    S            



             tob)                                                

 

             levofloxacin (test code =  ug/mL                    S            



             lev)                                                

 

             trimethoprim/sulfamethoxazo  ug/mL                    S            



             le (test code = sxt)                                        



PARTHYROID HORMONE (INTACT)2019 08:24:00





             Test Item    Value        Reference Range Interpretation Comments

 

             PTH INTACT (test code 166.6 pg/mL  18.4-80.1    H            



             = A85)                                              

 

             Ref Range Change ***** Please note the                           



             (test code = REF change in reference                           



             RANGE)       range ***                              



VITAMIN D TOTAL 25 (OH)2019 07:15:00





             Test Item    Value        Reference Range Interpretation Comments

 

             VITAMIN D 25(OH) (test code = VD) 36.0 ng/mL   30.0-100.0          

      



SERUM PROTEIN [id number] 06:20:00





             Test Item    Value        Reference Range Interpretation Comments

 

             PROTEIN TOTAL (test 5.7 g/dL     6.1-8.1      L            TEST PER

FORMED AT:[facility]



             code = 13440170)                                        [facility]

[id number]



                                                                 [facility]

[place], [place]



                                                                 [id number] FELIX MD

 

             ALBUMIN (test code = 3.2 g/dL     3.8-4.8      L            



             20990517)                                           

 

             ALPHA-1-GLOBULINS 0.4 g/dL     0.2-0.3      H            



             (test code =                                        



             44565546)                                           

 

             ALPHA-2-GLOBULINS 0.8 g/dL     0.5-0.9                   



             (test code =                                        



             97696682)                                           

 

             BETA 1 GLOBULIN (test 0.5 g/dL     0.4-0.6                   



             code = 79658767)                                        

 

             BETA 2 GLOBULIN (test 0.2 g/dL     0.2-0.5                   



             code = 73898726)                                        

 

             GAMMA GLOBULINS (test 0.6 g/dL     0.8-1.7      L            



             code = 60319975)                                        

 

             INTERPRETATION (test                                        Pattern

 consistent with



             code = 36496369)                                        an acute ph

ase



                                                                 reactionConsist

ent with



                                                                 hypogammaglobul

inemia.



                                                                 Serum free ligh

tchains



                                                                 or urine immuno

fixation



                                                                 should be consi

dered



                                                                 ifplasma cell d

yscrasias



                                                                 are a possible



                                                                 clinicaldiagnos

is.TEST



                                                                 PERFORMED AT:[facility]

[facility]



                                                                 [facility]

[id number]



                                                                 [facility]

[place], [place]



                                                                 [id number] FELIX MD



[id number] 06:13:00





             Test Item    Value        Reference Range Interpretation Comments

 

             MAGNESIUM (test code = 48A) 1.7 mg/dL    1.8-2.4      L            



COMPREHENSIVE METABOLIC [id number] 06:13:00





             Test Item    Value        Reference Range Interpretation Comments

 

             GLUCOSE (test code = 06D) 110 mg/dL           H            

 

             SODIUM (test code = 01A) 140 mmol/L   136-145                   

 

             POTASSIUM (test code = 01B) 3.1 mmol/L   3.6-5.1      L            

 

             CHLORIDE (test code = 04A) 96 mmol/L           L            

 

             CO2 (test code = 02A) 34 mmol/L    22-32        H            

 

             ANION GAP (test code = ANG) 13.1 mmol/L                            

 

             BUN (test code = 05D) 33 mg/dL     7-18         H            

 

             CREATININE (test code = 03E) 1.5 mg/dL    0.4-1.1      H           

 

 

             BUN/CREA (test code = BCR) 22           12-20        H            

 

             CALCIUM (test code = 09D) 9.1 mg/dL    8.3-9.5                   

 

             BILI TOTAL (test code = 11A) 1.4 mg/dL    0.2-1.0      H           

 

 

             PROTEIN (test code = 07D) 7.0 g/dL     6.4-8.2                   

 

             ALBUMIN (test code = 08D) 3.2 g/dL     3.5-4.8      L            

 

             GLOBULIN (test code = GLB) 3.8 g/dL     1.5-3.8                   

 

             ALB/GLOB (test code = AGRR) 0.8          1.0-2.6      L            

 

             ALK PHOS (test code = 35A) 111 IU/L                         

 

             AST (test code = 30A) 18 IU/L      <=42                      

 

             ALT (test code = 31A) 43 IU/L      <=78                      



PHOSPHORUS (P04)2019 06:13:00





             Test Item    Value        Reference Range Interpretation Comments

 

             PHOSPHORUS (test code = 43D) 4.0 mg/dL    2.7-4.6                  

 



CBC (INCLUDES AUTOMATED DIFFERENTIAL)2019 05:48:00





             Test Item    Value        Reference Range Interpretation Comments

 

             WBC (test code = WBC) 9.4 10\S\3/uL 4.5-11.0                  

 

             RBC (test code = RBC) 3.73 10\S\6/uL 4.20-5.60    L            

 

             HGB (test code = HBG) 10.8 g/dL    12.0-15.5    L            

 

             HCT (test code = HCT) 33.8 %       35.0-44.0    L            

 

             MCV (test code = MCV) 90.6 fL      81.0-99.0                 

 

             MCH (test code = MCH) 29.0 pg      27.0-31.0                 

 

             MCHC (test code = MCHC) 32.0 g/dL    32.0-36.0                 

 

             RDW (test code = RDW) 15.1 %       11.5-14.5    H            

 

             PLT (test code = PLT) 189 10\S\3/uL 130-400                   

 

             MPV (test code = MPV) 11.8 fL      9.4-12.4                  

 

             NEUTROP # (test code = NE#) 7.0 10\S\3/uL 1.6-8.0                  

 

 

             LYMPH # (test code = LY#) 1.6 10\S\3/uL 1.1-3.5                   

 

             MONOCYTE # (test code = MO#) 0.7 10\S\3/uL 0.0-1.1                 

  

 

             EOSINOPH # (test code = EO#) 0.1 10\S\3/uL 0.0-0.7                 

  

 

             BASOPHIL # (test code = BA#) 0.0 10\S\3/uL 0.0-0.3                 

  

 

             IG # (test code = IG#) 0.06 10\S\3/uL 0.00-0.06                 

 

             NRBC # (test code = NRBC#) 0.00 10\S\3/uL 0.00-0.01                

 

 

             NEUTROPH % (test code = NE%) 74.5 %       35.0-73.0    H           

 

 

             LYMPH % (test code = LY%) 16.5 %       20.0-55.0    L            

 

             MONO % (test code = MO%) 7.0 %        2.5-10.0                  

 

             EOSINOPH % (test code = EO%) 1.1 %        0.0-5.0                  

 

 

             BASOPHIL % (test code = BA%) 0.3 %        0.0-2.0                  

 

 

             IG % (test code = IG%) 0.6 %        0.0-0.8                   

 

             NRBC% (test code = NRBC%) 0.0 %        0.0-0.2                   

 

             MANDIFF (test code = MDIFF) NO           NO                        

 

             RBC MORPH (test code = RBCMOR)              NORMAL                 

   



COMPREHENSIVE METABOLIC [id number] 05:30:00





             Test Item    Value        Reference Range Interpretation Comments

 

             GLUCOSE (test code = 06D) 122 mg/dL           H            

 

             SODIUM (test code = 01A) 140 mmol/L   136-145                   

 

             POTASSIUM (test code = 01B) 3.8 mmol/L   3.6-5.1                   

 

             CHLORIDE (test code = 04A) 100 mmol/L                       

 

             CO2 (test code = 02A) 32 mmol/L    22-32                     

 

             ANION GAP (test code = ANG) 11.8 mmol/L                            

 

             BUN (test code = 05D) 29 mg/dL     7-18         H            

 

             CREATININE (test code = 03E) 1.5 mg/dL    0.4-1.1      H           

 

 

             BUN/CREA (test code = BCR) 20           12-20                     

 

             CALCIUM (test code = 09D) 9.0 mg/dL    8.3-9.5                   

 

             BILI TOTAL (test code = 11A) 1.3 mg/dL    0.2-1.0      H           

 

 

             PROTEIN (test code = 07D) 7.1 g/dL     6.4-8.2                   

 

             ALBUMIN (test code = 08D) 3.5 g/dL     3.5-4.8                   

 

             GLOBULIN (test code = GLB) 3.6 g/dL     1.5-3.8                   

 

             ALB/GLOB (test code = AGRR) 1.0          1.0-2.6                   

 

             ALK PHOS (test code = 35A) 119 IU/L                         

 

             AST (test code = 30A) 22 IU/L      <=42                      

 

             ALT (test code = 31A) 49 IU/L      <=78                      



CBC (INCLUDES AUTOMATED DIFFERENTIAL)2019 05:18:00





             Test Item    Value        Reference Range Interpretation Comments

 

             WBC (test code = WBC) 9.0 10\S\3/uL 4.5-11.0                  

 

             RBC (test code = RBC) 3.94 10\S\6/uL 4.20-5.60    L            

 

             HGB (test code = HBG) 11.4 g/dL    12.0-15.5    L            

 

             HCT (test code = HCT) 35.8 %       35.0-44.0                 

 

             MCV (test code = MCV) 90.9 fL      81.0-99.0                 

 

             MCH (test code = MCH) 28.9 pg      27.0-31.0                 

 

             MCHC (test code = MCHC) 31.8 g/dL    32.0-36.0    L            

 

             RDW (test code = RDW) 14.8 %       11.5-14.5    H            

 

             PLT (test code = PLT) 164 10\S\3/uL 130-400                   

 

             MPV (test code = MPV) 11.6 fL      9.4-12.4                  

 

             NEUTROP # (test code = NE#) 6.8 10\S\3/uL 1.6-8.0                  

 

 

             LYMPH # (test code = LY#) 1.4 10\S\3/uL 1.1-3.5                   

 

             MONOCYTE # (test code = MO#) 0.6 10\S\3/uL 0.0-1.1                 

  

 

             EOSINOPH # (test code = EO#) 0.1 10\S\3/uL 0.0-0.7                 

  

 

             BASOPHIL # (test code = BA#) 0.0 10\S\3/uL 0.0-0.3                 

  

 

             IG # (test code = IG#) 0.06 10\S\3/uL 0.00-0.06                 

 

             NRBC # (test code = NRBC#) 0.00 10\S\3/uL 0.00-0.01                

 

 

             NEUTROPH % (test code = NE%) 75.6 %       35.0-73.0    H           

 

 

             LYMPH % (test code = LY%) 15.9 %       20.0-55.0    L            

 

             MONO % (test code = MO%) 6.5 %        2.5-10.0                  

 

             EOSINOPH % (test code = EO%) 0.9 %        0.0-5.0                  

 

 

             BASOPHIL % (test code = BA%) 0.4 %        0.0-2.0                  

 

 

             IG % (test code = IG%) 0.7 %        0.0-0.8                   

 

             NRBC% (test code = NRBC%) 0.0 %        0.0-0.2                   

 

             MANDIFF (test code = MDIFF) NO           NO                        

 

             RBC MORPH (test code = RBCMOR)              NORMAL                 

   



URINALYSIS WITH [id number] 06:44:00





             Test Item    Value        Reference Range Interpretation Comments

 

             COLOR (test code = COLU) YELLOW       YELLOW                    

 

             CLARITY (test code = CLA) CLOUDY       CLEAR        A            

 

             GLUCOSE UR (test code = UA NEGATIVE     NEGATIVE                  



             GLUCOSE)                                            

 

             BILI UR (test code = BILE) NEGATIVE     NEGATIVE                  

 

             KETONES UR (test code = ACE) NEGATIVE     NEGATIVE                 

 

 

             SP GRAVITY (test code = SPGR) 1.014        1.005-1.030             

  

 

             PH UR (test code = PH) 6.0          4.5-8.0                   

 

             PROTEIN UR (test code = PU) TRACE        NEGATIVE     A            

 

             UROBIL UR (test code = UROQ) 0.2 EU/dL    0.2-1.0                  

 

 

             NITRITE UR (test code = NEGATIVE     NEGATIVE                  



             NITRITE)                                            

 

             BLOOD UR (test code = UA BLOOD) TRACE        NEGATIVE     A        

    

 

             LEUK ES UR (test code = LEUK) 2+           NEGATIVE     A          

  

 

             WBC UR (test code = UWBC) 12 /HPF      0-5          H            

 

             RBC UR (test code = URBC) 1 /HPF       0-2                       

 

             EPITH UR (test code = UEPC) FEW /LPF     FEW                       

 

             BACTERIA UR (test code = UBACT) MODERATE /HPF NONE         A       

     

 

             CAST UR (test code = CAST)  /LPF        NONE                      

 

             CRYSTAL UR (test code = CRYU)  / LPF       NONE                    

  

 

             MUCUS UR (test code = MUC)  / HPF       NONE                      

 

             AMORPH UR (test code = YASMEEN)  / HPF       NONE                     

 

 

             TRICH UR (test code = UTRICH)  /HPF        NONE                    

  

 

             YEAST UR (test code = UY)  /HPF        NONE                      

 

             SPERM UR (test code = USPERM)  /HPF        NONE                    

  



COMPREHENSIVE METABOLIC [id number] 05:24:00





             Test Item    Value        Reference Range Interpretation Comments

 

             GLUCOSE (test code = 06D) 105 mg/dL           H            

 

             SODIUM (test code = 01A) 138 mmol/L   136-145                   

 

             POTASSIUM (test code = 01B) 4.0 mmol/L   3.6-5.1                   

 

             CHLORIDE (test code = 04A) 104 mmol/L                       

 

             CO2 (test code = 02A) 25 mmol/L    22-32                     

 

             ANION GAP (test code = ANG) 13.0 mmol/L                            

 

             BUN (test code = 05D) 29 mg/dL     7-18         H            

 

             CREATININE (test code = 03E) 1.5 mg/dL    0.4-1.1      H           

 

 

             BUN/CREA (test code = BCR) 19           12-20                     

 

             CALCIUM (test code = 09D) 8.4 mg/dL    8.3-9.5                   

 

             BILI TOTAL (test code = 11A) 0.7 mg/dL    0.2-1.0                  

 

 

             PROTEIN (test code = 07D) 6.2 g/dL     6.4-8.2      L            

 

             ALBUMIN (test code = 08D) 3.1 g/dL     3.5-4.8      L            

 

             GLOBULIN (test code = GLB) 3.1 g/dL     1.5-3.8                   

 

             ALB/GLOB (test code = AGRR) 1.0          1.0-2.6                   

 

             ALK PHOS (test code = 35A) 104 IU/L                         

 

             AST (test code = 30A) 22 IU/L      <=42                      

 

             ALT (test code = 31A) 42 IU/L      <=78                      



CBC (INCLUDES AUTOMATED DIFFERENTIAL)2019 05:07:00





             Test Item    Value        Reference Range Interpretation Comments

 

             WBC (test code = WBC) 8.6 10\S\3/uL 4.5-11.0                  

 

             RBC (test code = RBC) 3.72 10\S\6/uL 4.20-5.60    L            

 

             HGB (test code = HBG) 10.8 g/dL    12.0-15.5    L            

 

             HCT (test code = HCT) 33.7 %       35.0-44.0    L            

 

             MCV (test code = MCV) 90.6 fL      81.0-99.0                 

 

             MCH (test code = MCH) 29.0 pg      27.0-31.0                 

 

             MCHC (test code = MCHC) 32.0 g/dL    32.0-36.0                 

 

             RDW (test code = RDW) 14.7 %       11.5-14.5    H            

 

             PLT (test code = PLT) 152 10\S\3/uL 130-400                   

 

             MPV (test code = MPV) 11.4 fL      9.4-12.4                  

 

             NEUTROP # (test code = NE#) 6.2 10\S\3/uL 1.6-8.0                  

 

 

             LYMPH # (test code = LY#) 1.6 10\S\3/uL 1.1-3.5                   

 

             MONOCYTE # (test code = MO#) 0.5 10\S\3/uL 0.0-1.1                 

  

 

             EOSINOPH # (test code = EO#) 0.2 10\S\3/uL 0.0-0.7                 

  

 

             BASOPHIL # (test code = BA#) 0.1 10\S\3/uL 0.0-0.3                 

  

 

             IG # (test code = IG#) 0.03 10\S\3/uL 0.00-0.06                 

 

             NRBC # (test code = NRBC#) 0.00 10\S\3/uL 0.00-0.01                

 

 

             NEUTROPH % (test code = NE%) 72.8 %       35.0-73.0                

 

 

             LYMPH % (test code = LY%) 18.5 %       20.0-55.0    L            

 

             MONO % (test code = MO%) 5.8 %        2.5-10.0                  

 

             EOSINOPH % (test code = EO%) 2.0 %        0.0-5.0                  

 

 

             BASOPHIL % (test code = BA%) 0.6 %        0.0-2.0                  

 

 

             IG % (test code = IG%) 0.3 %        0.0-0.8                   

 

             NRBC% (test code = NRBC%) 0.0 %        0.0-0.2                   

 

             MANDIFF (test code = MDIFF) NO           NO                        

 

             RBC MORPH (test code = RBCMOR)              NORMAL                 

   



U/S KIDNEY (RENAL)2019 23:20:42LOCATION: [id number]: 63-year-old female 
who presents with acute nontraumatic kidney injury.COMMENT:Sonographic imaging 
of this patient's retroperitoneum was performed.The right kidney measures 9.9 x 
5.9 x 6.4 cm with a 13 mm cortical thickness. Acyst is seen in the upper pole 
measuring 23 x 19 x 19 mm, and a second cyst isseen in the lower pole measuring 
18 x 16 x 16 mm.The left kidney measures 9.4 x 5.3 x 5.6 cm with a 11 mm 
cortical thickness. Nocysts or masses are seen in the left kidney.Cortical 
echotexture of the kidneys otherwise is unremarkable.There is no evidence of 
hydronephrosis of either kidney.In the urinary bladder only the left urine jet 
was observed on the color flowstudy. The bladder otherwise is 
unremarkable.IMPRESSION:Cystic changes are seen in the upper pole and lower pole
ofthis patient'sright kidney. No gross abnormalities are seen elsewhere in 
either kidney.The urinary bladder is unremarkable. Only the left urine jet was 
observed onthe color flow study.TROPONIN -[id number] 07:14:00





             Test Item    Value        Reference Range Interpretation Comments

 

             TROPONIN I (test code = A84) <0.015 ng/mL 0.000-0.045              

 



COMPREHENSIVE METABOLIC [id number] 05:28:00





             Test Item    Value        Reference Range Interpretation Comments

 

             GLUCOSE (test code = 06D) 110 mg/dL           H            

 

             SODIUM (test code = 01A) 138 mmol/L   136-145                   

 

             POTASSIUM (test code = 01B) 3.9 mmol/L   3.6-5.1                   

 

             CHLORIDE (test code = 04A) 106 mmol/L                       

 

             CO2 (test code = 02A) 24 mmol/L    22-32                     

 

             ANION GAP (test code = ANG) 11.9 mmol/L                            

 

             BUN (test code = 05D) 22 mg/dL     7-18         H            

 

             CREATININE (test code = 03E) 1.5 mg/dL    0.4-1.1      H           

 

 

             BUN/CREA (test code = BCR) 15           12-20                     

 

             CALCIUM (test code = 09D) 8.2 mg/dL    8.3-9.5      L            

 

             BILI TOTAL (test code = 11A) 0.6 mg/dL    0.2-1.0                  

 

 

             PROTEIN (test code = 07D) 6.0 g/dL     6.4-8.2      L            

 

             ALBUMIN (test code = 08D) 2.9 g/dL     3.5-4.8      L            

 

             GLOBULIN (test code = GLB) 3.1 g/dL     1.5-3.8                   

 

             ALB/GLOB (test code = AGRR) 0.9          1.0-2.6      L            

 

             ALK PHOS (test code = 35A) 96 IU/L                          

 

             AST (test code = 30A) 19 IU/L      <=42                      

 

             ALT (test code = 31A) 35 IU/L      <=78                      



CBC (INCLUDES AUTOMATED DIFFERENTIAL)2019 05:14:00





             Test Item    Value        Reference Range Interpretation Comments

 

             WBC (test code = WBC) 7.0 10\S\3/uL 4.5-11.0                  

 

             RBC (test code = RBC) 3.64 10\S\6/uL 4.20-5.60    L            

 

             HGB (test code = HBG) 10.6 g/dL    12.0-15.5    L            

 

             HCT (test code = HCT) 33.5 %       35.0-44.0    L            

 

             MCV (test code = MCV) 92.0 fL      81.0-99.0                 

 

             MCH (test code = MCH) 29.1 pg      27.0-31.0                 

 

             MCHC (test code = MCHC) 31.6 g/dL    32.0-36.0    L            

 

             RDW (test code = RDW) 14.9 %       11.5-14.5    H            

 

             PLT (test code = PLT) 145 10\S\3/uL 130-400                   

 

             MPV (test code = MPV) 11.4 fL      9.4-12.4                  

 

             NEUTROP # (test code = NE#) 4.2 10\S\3/uL 1.6-8.0                  

 

 

             LYMPH # (test code = LY#) 2.1 10\S\3/uL 1.1-3.5                   

 

             MONOCYTE # (test code = MO#) 0.5 10\S\3/uL 0.0-1.1                 

  

 

             EOSINOPH # (test code = EO#) 0.1 10\S\3/uL 0.0-0.7                 

  

 

             BASOPHIL # (test code = BA#) 0.0 10\S\3/uL 0.0-0.3                 

  

 

             IG # (test code = IG#) 0.04 10\S\3/uL 0.00-0.06                 

 

             NRBC # (test code = NRBC#) 0.00 10\S\3/uL 0.00-0.01                

 

 

             NEUTROPH % (test code = NE%) 59.7 %       35.0-73.0                

 

 

             LYMPH % (test code = LY%) 30.1 %       20.0-55.0                 

 

             MONO % (test code = MO%) 7.0 %        2.5-10.0                  

 

             EOSINOPH % (test code = EO%) 2.0 %        0.0-5.0                  

 

 

             BASOPHIL % (test code = BA%) 0.6 %        0.0-2.0                  

 

 

             IG % (test code = IG%) 0.6 %        0.0-0.8                   

 

             NRBC% (test code = NRBC%) 0.0 %        0.0-0.2                   

 

             MANDIFF (test code = MDIFF) NO           NO                        

 

             RBC MORPH (test code = RBCMOR)              NORMAL                 

   



COMPREHENSIVE METABOLIC PAN2019-08-15 04:58:00





             Test Item    Value        Reference Range Interpretation Comments

 

             GLUCOSE (test code = 06D) 112 mg/dL           H            

 

             SODIUM (test code = 01A) 143 mmol/L   136-145                   

 

             POTASSIUM (test code = 01B) 4.4 mmol/L   3.6-5.1                   

 

             CHLORIDE (test code = 04A) 108 mmol/L          H            

 

             CO2 (test code = 02A) 27 mmol/L    22-32                     

 

             ANION GAP (test code = ANG) 12.4 mmol/L                            

 

             BUN (test code = 05D) 19 mg/dL     7-18         H            

 

             CREATININE (test code = 03E) 1.4 mg/dL    0.4-1.1      H           

 

 

             BUN/CREA (test code = BCR) 14           12-20                     

 

             CALCIUM (test code = 09D) 8.5 mg/dL    8.3-9.5                   

 

             BILI TOTAL (test code = 11A) 0.9 mg/dL    0.2-1.0                  

 

 

             PROTEIN (test code = 07D) 6.2 g/dL     6.4-8.2      L            

 

             ALBUMIN (test code = 08D) 2.9 g/dL     3.5-4.8      L            

 

             GLOBULIN (test code = GLB) 3.3 g/dL     1.5-3.8                   

 

             ALB/GLOB (test code = AGRR) 0.9          1.0-2.6      L            

 

             ALK PHOS (test code = 35A) 95 IU/L                          

 

             AST (test code = 30A) 19 IU/L      <=42                      

 

             ALT (test code = 31A) 40 IU/L      <=78                      



CBC (INCLUDES AUTOMATED DIFFERENTIAL)2019-08-15 04:51:00





             Test Item    Value        Reference Range Interpretation Comments

 

             WBC (test code = WBC) 8.2 10\S\3/uL 4.5-11.0                  

 

             RBC (test code = RBC) 3.48 10\S\6/uL 4.20-5.60    L            

 

             HGB (test code = HBG) 10.3 g/dL    12.0-15.5    L            

 

             HCT (test code = HCT) 32.4 %       35.0-44.0    L            

 

             MCV (test code = MCV) 93.1 fL      81.0-99.0                 

 

             MCH (test code = MCH) 29.6 pg      27.0-31.0                 

 

             MCHC (test code = MCHC) 31.8 g/dL    32.0-36.0    L            

 

             RDW (test code = RDW) 15.5 %       11.5-14.5    H            

 

             PLT (test code = PLT) 163 10\S\3/uL 130-400                   

 

             MPV (test code = MPV) 11.8 fL      9.4-12.4                  

 

             NEUTROP # (test code = NE#) 5.3 10\S\3/uL 1.6-8.0                  

 

 

             LYMPH # (test code = LY#) 2.0 10\S\3/uL 1.1-3.5                   

 

             MONOCYTE # (test code = MO#) 0.6 10\S\3/uL 0.0-1.1                 

  

 

             EOSINOPH # (test code = EO#) 0.2 10\S\3/uL 0.0-0.7                 

  

 

             BASOPHIL # (test code = BA#) 0.0 10\S\3/uL 0.0-0.3                 

  

 

             IG # (test code = IG#) 0.03 10\S\3/uL 0.00-0.06                 

 

             NRBC # (test code = NRBC#) 0.00 10\S\3/uL 0.00-0.01                

 

 

             NEUTROPH % (test code = NE%) 65.5 %       35.0-73.0                

 

 

             LYMPH % (test code = LY%) 24.2 %       20.0-55.0                 

 

             MONO % (test code = MO%) 7.2 %        2.5-10.0                  

 

             EOSINOPH % (test code = EO%) 2.2 %        0.0-5.0                  

 

 

             BASOPHIL % (test code = BA%) 0.5 %        0.0-2.0                  

 

 

             IG % (test code = IG%) 0.4 %        0.0-0.8                   

 

             NRBC% (test code = NRBC%) 0.0 %        0.0-0.2                   

 

             MANDIFF (test code = MDIFF) NO           NO                        



CARDIAC [id number] 23:10:00





             Test Item    Value        Reference Range Interpretation Comments

 

             TROPONIN I (test code = A84) <0.015 ng/mL 0.000-0.045              

 



U/S VENOUS DOPPLER IVON LOW [id number] 22:23:14LOCATION: [id number]: 
63-year-old female who presents with bilateral leg swelling.COMMENT: Sonographic
imaging of the venous anatomy in both of this patient's legs wasobtained 
utilizing grayscale, color-flow, and Doppler waveform imagingmodalities.The 
common femoral veins, superficial femoral veins, popliteal veins, 
posteriortibial veins, anterior tibial veins, and peroneal veins in both legs 
wereincluded in the study.The anatomy exhibits normal compressibility on 
grayscale study. No suspiciousfilling defects are seen on the color flow study. 
Appropriate waveform responseas are noted on the Dopplerstudy during 
augmentation maneuvers and duringquiet respiration. IMPRESSION:There is no 
sonographic evidence of venous thrombosis in either of thispatient's legs.
CARDIAC [id number] 16:50:00





             Test Item    Value        Reference Range Interpretation Comments

 

             TROPONIN I (test code = A84) <0.015 ng/mL 0.000-0.045              

 



BRAIN NATRIURETIC [id number] 14:39:00





             Test Item    Value        Reference Range Interpretation Comments

 

             proBNP (test code = PBNP) 1337 pg/mL   0-125        H            



THYROID PANEL/SCREEN (TSH)2019 12:05:00





             Test Item    Value        Reference Range Interpretation Comments

 

             TSH (test code = A57) 0.989 uIU/mL 0.358-3.740               



[id number] 12:02:00





             Test Item    Value        Reference Range Interpretation Comments

 

             CPK (test code = 32A) 98 IU/L                          



AMYLASE AND [id number] 12:02:00





             Test Item    Value        Reference Range Interpretation Comments

 

             AMYLASE (test code = 10A) 19 U/L              L            

 

             LIPASE (test code = 60A) 67 IU/L             L            



COMPREHENSIVE METABOLIC [id number] 12:02:00





             Test Item    Value        Reference Range Interpretation Comments

 

             GLUCOSE (test code = 06D) 109 mg/dL           H            

 

             SODIUM (test code = 01A) 142 mmol/L   136-145                   

 

             POTASSIUM (test code = 01B) 4.2 mmol/L   3.6-5.1                   

 

             CHLORIDE (test code = 04A) 109 mmol/L          H            

 

             CO2 (test code = 02A) 26 mmol/L    22-32                     

 

             ANION GAP (test code = ANG) 11.2 mmol/L                            

 

             BUN (test code = 05D) 16 mg/dL     7-18                      

 

             CREATININE (test code = 03E) 1.3 mg/dL    0.4-1.1      H           

 

 

             BUN/CREA (test code = BCR) 13           12-20                     

 

             CALCIUM (test code = 09D) 8.7 mg/dL    8.3-9.5                   

 

             BILI TOTAL (test code = 11A) 1.3 mg/dL    0.2-1.0      H           

 

 

             PROTEIN (test code = 07D) 6.5 g/dL     6.4-8.2                   

 

             ALBUMIN (test code = 08D) 3.2 g/dL     3.5-4.8      L            

 

             GLOBULIN (test code = GLB) 3.3 g/dL     1.5-3.8                   

 

             ALB/GLOB (test code = AGRR) 1.0          1.0-2.6                   

 

             ALK PHOS (test code = 35A) 101 IU/L                         

 

             AST (test code = 30A) 21 IU/L      <=42                      

 

             ALT (test code = 31A) 41 IU/L      <=78                      



TROPONIN [id number] 11:57:00





             Test Item    Value        Reference Range Interpretation Comments

 

             TROPONIN I (test code = A84) <0.015 ng/mL 0.000-0.045              

 



XR CHEST 1 VIEW [id number] 11:55:22EXAM: XR CHEST 1 VIEW 
PORTABLE.LOCATION: .HISTORY: [id number]: Chest pain.COMPARISON: Radiograph dated
2019.TECHNIQUE: Single AP view of the chest was obtained. FINDINGS:The 
heart is enlarged in size. Small left pleural effusion and left basilaropacities
are present. There is elevation of the right hemidiaphragm. No acuteosseous 
abnormality is identified.IMPRESSION:Small left pleural effusion with left 
basilar opacities, which may representatelectasis or infiltrates.Cardiomegaly.
PRO TIME AND [id number] 11:43:00





             Test Item    Value        Reference Range Interpretation Comments

 

             PT (test code = 13.4 s       9.8-13.6                  



             TT)                                                 

 

             INR (test code = 1.2                                    



             INR)                                                

 

             INRH (test code =   **** SUGGESTED                           



             INRH)        THERAPEUTIC RANGE FOR INR:                           



                          **** 2.5 - 3.5 ** For                           



                          Patients with Prosthetic                           



                          Valves or Patients with                           



                          recurrent Thromboembolic                           



                          Events ** 2.0 - 3.0 ** For                           



                          Most Other Applications **                           

 

             PTT (test code = 30.4 s       20.2-38.0                 



             PTT)                                                

 

             PTTH (test code = **** To monitor the                           



             PTTH)        effectiveness of heparin,                           



                          we offer the Anti-Xa                           



                          (Heparin Assay). It can be                           



                          used for either                           



                          unfractionated or LMW                           



                          Heparin. Order Code is                           



                          ANTI-XA ****                           



CBC (INCLUDES AUTOMATED DIFFERENTIAL)2019 11:36:00





             Test Item    Value        Reference Range Interpretation Comments

 

             WBC (test code = WBC) 7.6 10\S\3/uL 4.5-11.0                  

 

             RBC (test code = RBC) 3.53 10\S\6/uL 4.20-5.60    L            

 

             HGB (test code = HBG) 10.3 g/dL    12.0-15.5    L            

 

             HCT (test code = HCT) 33.5 %       35.0-44.0    L            

 

             MCV (test code = MCV) 94.9 fL      81.0-99.0                 

 

             MCH (test code = MCH) 29.2 pg      27.0-31.0                 

 

             MCHC (test code = MCHC) 30.7 g/dL    32.0-36.0    L            

 

             RDW (test code = RDW) 15.4 %       11.5-14.5    H            

 

             PLT (test code = PLT) 164 10\S\3/uL 130-400                   

 

             MPV (test code = MPV) 11.7 fL      9.4-12.4                  

 

             NEUTROP # (test code = NE#) 5.6 10\S\3/uL 1.6-8.0                  

 

 

             LYMPH # (test code = LY#) 1.4 10\S\3/uL 1.1-3.5                   

 

             MONOCYTE # (test code = MO#) 0.4 10\S\3/uL 0.0-1.1                 

  

 

             EOSINOPH # (test code = EO#) 0.1 10\S\3/uL 0.0-0.7                 

  

 

             BASOPHIL # (test code = BA#) 0.0 10\S\3/uL 0.0-0.3                 

  

 

             IG # (test code = IG#) 0.04 10\S\3/uL 0.00-0.06                 

 

             NRBC # (test code = NRBC#) 0.00 10\S\3/uL 0.00-0.01                

 

 

             NEUTROPH % (test code = NE%) 73.9 %       35.0-73.0    H           

 

 

             LYMPH % (test code = LY%) 18.0 %       20.0-55.0    L            

 

             MONO % (test code = MO%) 5.7 %        2.5-10.0                  

 

             EOSINOPH % (test code = EO%) 1.6 %        0.0-5.0                  

 

 

             BASOPHIL % (test code = BA%) 0.3 %        0.0-2.0                  

 

 

             IG % (test code = IG%) 0.5 %        0.0-0.8                   

 

             NRBC% (test code = NRBC%) 0.0 %        0.0-0.2                   

 

             MANDIFF (test code = MDIFF) NO           NO                        

 

             RBC MORPH (test code = RBCMOR)              NORMAL                 

   



CBC WITH [id number] 15:29:00





             Test Item    Value        Reference Range Interpretation Comments

 

             WBC (test code = WBC) 9.7 10\S\3/uL 4.5-11.0                  

 

             RBC (test code = RBC) 3.73 10\S\6/uL 4.20-5.60    L            

 

             HGB (test code = HBG) 11.0 g/dL    12.0-15.5    L            

 

             HCT (test code = HCT) 34.1 %       35.0-44.0    L            

 

             MCV (test code = MCV) 91.4 fL      81.0-99.0                 

 

             MCH (test code = MCH) 29.5 pg      27.0-31.0                 

 

             MCHC (test code = MCHC) 32.3 g/dL    32.0-36.0                 

 

             RDW (test code = RDW) 14.2 %       11.5-14.5                 

 

             PLT (test code = PLT) 116 10\S\3/uL 130-400      L            

 

             MPV (test code = MPV) 11.8 fL      9.4-12.4                  

 

             NEUTROP # (test code = NE#) 7.9 10\S\3/uL 1.6-8.0                  

 

 

             LYMPH # (test code = LY#) 1.0 10\S\3/uL 1.1-3.5      L            

 

             MONOCYTE # (test code = 0.6 10\S\3/uL 0.0-1.1                   



             MO#)                                                

 

             EOSINOPH # (test code = 0.1 10\S\3/uL 0.0-0.7                   



             EO#)                                                

 

             BASOPHIL # (test code = 0.0 10\S\3/uL 0.0-0.3                   



             BA#)                                                

 

             IG # (test code = IG#) 0.04 10\S\3/uL 0.00-0.06                 

 

             NRBC # (test code = NRBC#) 0.00 10\S\3/uL 0.00-0.01                

 

 

             NEUTROPH % (test code = 81.6 %       35.0-73.0    H            



             NE%)                                                

 

             LYMPH % (test code = LY%) 10.3 %       20.0-55.0    L            

 

             MONO % (test code = MO%) 6.4 %        2.5-10.0                  

 

             EOSINOPH % (test code = 1.0 %        0.0-5.0                   



             EO%)                                                

 

             BASOPHIL % (test code = 0.3 %        0.0-2.0                   



             BA%)                                                

 

             IG % (test code = IG%) 0.4 %        0.0-0.8                   

 

             NRBC% (test code = NRBC%) 0.0 %        0.0-0.2                   

 

             PLT EST (test code = ADEQUATE     ADEQUATE                  



             PLTEST)                                             

 

             PLT MORPH (test code = NORMAL (1.5-3 um) NORMAL                    



             PLTMOR)                                             



BASIC METABOLIC [id number] 15:26:00





             Test Item    Value        Reference Range Interpretation Comments

 

             GLUCOSE (test code = 06D) 118 mg/dL           H            

 

             SODIUM (test code = 01A) 136 mmol/L   136-145                   

 

             POTASSIUM (test code = 01B) 4.2 mmol/L   3.6-5.1                   

 

             CHLORIDE (test code = 04A) 106 mmol/L                       

 

             CO2 (test code = 02A) 24 mmol/L    22-32                     

 

             ANION GAP (test code = ANG) 10.2 mmol/L                            

 

             BUN (test code = 05D) 38 mg/dL     7-18         H            

 

             CREATININE (test code = 03E) 1.6 mg/dL    0.4-1.1      H           

 

 

             BUN/CREA (test code = BCR) 23           12-20        H            

 

             CALCIUM (test code = 09D) 9.2 mg/dL    8.3-9.5                   



CARDIAC [id number] 06:04:00





             Test Item    Value        Reference Range Interpretation Comments

 

             TROPONIN I (test code = A84) <0.015 ng/mL 0.000-0.045              

 



BASIC METABOLIC [id number] 05:52:00





             Test Item    Value        Reference Range Interpretation Comments

 

             GLUCOSE (test code = 06D) 171 mg/dL           H            

 

             SODIUM (test code = 01A) 138 mmol/L   136-145                   

 

             POTASSIUM (test code = 01B) 4.0 mmol/L   3.6-5.1                   

 

             CHLORIDE (test code = 04A) 107 mmol/L                       

 

             CO2 (test code = 02A) 23 mmol/L    22-32                     

 

             ANION GAP (test code = ANG) 12.0 mmol/L                            

 

             BUN (test code = 05D) 19 mg/dL     7-18         H            

 

             CREATININE (test code = 03E) 1.2 mg/dL    0.4-1.1      H           

 

 

             BUN/CREA (test code = BCR) 15           12-20                     

 

             CALCIUM (test code = 09D) 8.5 mg/dL    8.3-9.5                   



CBC (INCLUDES AUTOMATED DIFFERENTIAL)2019 05:30:00





             Test Item    Value        Reference Range Interpretation Comments

 

             WBC (test code = WBC) 14.0 10\S\3/uL 4.5-11.0     H            

 

             RBC (test code = RBC) 3.64 10\S\6/uL 4.20-5.60    L            

 

             HGB (test code = HBG) 10.8 g/dL    12.0-15.5    L            

 

             HCT (test code = HCT) 33.2 %       35.0-44.0    L            

 

             MCV (test code = MCV) 91.2 fL      81.0-99.0                 

 

             MCH (test code = MCH) 29.7 pg      27.0-31.0                 

 

             MCHC (test code = MCHC) 32.5 g/dL    32.0-36.0                 

 

             RDW (test code = RDW) 14.0 %       11.5-14.5                 

 

             PLT (test code = PLT) 143 10\S\3/uL 130-400                   

 

             MPV (test code = MPV) 11.4 fL      9.4-12.4                  

 

             NEUTROP # (test code = NE#) 12.1 10\S\3/uL 1.6-8.0      H          

  

 

             LYMPH # (test code = LY#) 0.9 10\S\3/uL 1.1-3.5      L            

 

             MONOCYTE # (test code = MO#) 0.9 10\S\3/uL 0.0-1.1                 

  

 

             EOSINOPH # (test code = EO#) 0.0 10\S\3/uL 0.0-0.7                 

  

 

             BASOPHIL # (test code = BA#) 0.0 10\S\3/uL 0.0-0.3                 

  

 

             IG # (test code = IG#) 0.10 10\S\3/uL 0.00-0.06    H            

 

             NRBC # (test code = NRBC#) 0.00 10\S\3/uL 0.00-0.01                

 

 

             NEUTROPH % (test code = NE%) 86.2 %       35.0-73.0    H           

 

 

             LYMPH % (test code = LY%) 6.3 %        20.0-55.0    L            

 

             MONO % (test code = MO%) 6.6 %        2.5-10.0                  

 

             EOSINOPH % (test code = EO%) 0.0 %        0.0-5.0                  

 

 

             BASOPHIL % (test code = BA%) 0.2 %        0.0-2.0                  

 

 

             IG % (test code = IG%) 0.7 %        0.0-0.8                   

 

             NRBC% (test code = NRBC%) 0.0 %        0.0-0.2                   

 

             MANDIFF (test code = MDIFF) NO           NO                        

 

             RBC MORPH (test code = RBCMOR)              NORMAL                 

   



CARDIAC [id number] 23:08:00





             Test Item    Value        Reference Range Interpretation Comments

 

             TROPONIN I (test code = A84) <0.015 ng/mL 0.000-0.045              

 



CT DISSECTION [id number] 19:26:35Exam: CT thorax PE protocol.Location: 
12History: [id number]: Chest painTechnique: Enhanced spiral slices were taken from
the apices of the lungs,through the upper abdomen utilizing a pulmonary embolism
protocol. Multiplanarreformations were performed. 100cc of Omnipaque were used. 
One or more of thefollowing radiation dose reduction techniques was used: 
automated exposurecontrol, adjustment of mA and/or KV according to patient size,
and/orutilization of iterative reconstruction technique.Findings:Nofilling 
defects are seen in the main pulmonary arteries. The pulmonaryvasculature is 
normal. No pulmonary venous congestion or arterial hypertensionis seen.The lungs
are clear. No infiltration or effusion is seen. No mass or nodule isseen.The 
mediastinum and the pulmonary kandis are normal. Calcified granulomas arenoted. No
lymphadenopathy is present. The heart size is normal.No pericardialeffusion is s
een.The visualized upper abdominal organs are unremarkable.Impression:1. 
Negative for pulmonary embolism.2. No acute disease.THYROID PANEL/SCREEN (TSH)
2019 18:29:00





             Test Item    Value        Reference Range Interpretation Comments

 

             TSH (test code = A57) 0.706 uIU/mL 0.358-3.740               



LIVER [id number] 18:28:00





             Test Item    Value        Reference Range Interpretation Comments

 

             BILI TOTAL (test code = 11A) 0.9 mg/dL    0.2-1.0                  

 

 

             BILI DIRCT (test code = 12A) 0.2 mg/dL    0.0-0.2                  

 

 

             BILI INDIR (test code = BILII) 0.7 mg/dL    <=0.8                  

   

 

             PROTEIN (test code = 07D) 7.7 g/dL     6.4-8.2                   

 

             ALBUMIN (test code = 08D) 3.8 g/dL     3.5-4.8                   

 

             GLOBULIN (test code = GLB) 3.9 g/dL     1.5-3.8      H            

 

             ALB/GLOB (test code = AGRR) 1.0          1.0-2.6                   

 

             ALK PHOS (test code = 35A) 106 IU/L                         

 

             AST (test code = 30A) 25 IU/L      <=42                      

 

             ALT (test code = 31A) 21 IU/L      <=78                      



BRAIN NATRIURETIC [id number] 18:19:00





             Test Item    Value        Reference Range Interpretation Comments

 

             proBNP (test code = PBNP) 518 pg/mL    0-125        H            



[id number] 18:15:00





             Test Item    Value        Reference Range Interpretation Comments

 

             MAGNESIUM (test code = 48A) 1.9 mg/dL    1.8-2.4                   



[id number] 17:40:00





             Test Item    Value        Reference Range Interpretation Comments

 

             D-DIMER (test code = <200 ng/mL D-DU 0-234                     



             DDI)                                                

 

             D-DIMER COMMENT (test *Level to rule out                           



             code = DDCOM) DVT or PE: <235 ng/mL                           



                          D-DU*                                  



CARDIAC [id number] 17:31:00





             Test Item    Value        Reference Range Interpretation Comments

 

             TROPONIN I (test code = A84) <0.015 ng/mL 0.000-0.045              

 



BASIC METABOLIC [id number] 17:27:00





             Test Item    Value        Reference Range Interpretation Comments

 

             GLUCOSE (test code = 06D) 114 mg/dL           H            

 

             SODIUM (test code = 01A) 142 mmol/L   136-145                   

 

             POTASSIUM (test code = 01B) 4.0 mmol/L   3.6-5.1                   

 

             CHLORIDE (test code = 04A) 111 mmol/L          H            

 

             CO2 (test code = 02A) 26 mmol/L    22-32                     

 

             ANION GAP (test code = ANG) 9.0 mmol/L                             

 

             BUN (test code = 05D) 19 mg/dL     7-18         H            

 

             CREATININE (test code = 03E) 1.2 mg/dL    0.4-1.1      H           

 

 

             BUN/CREA (test code = BCR) 15           12-20                     

 

             CALCIUM (test code = 09D) 9.2 mg/dL    8.3-9.5                   



CBC (INCLUDES AUTOMATED DIFFERENTIAL)2019 17:26:00





             Test Item    Value        Reference Range Interpretation Comments

 

             WBC (test code = WBC) 12.6 10\S\3/uL 4.5-11.0     H            

 

             RBC (test code = RBC) 4.04 10\S\6/uL 4.20-5.60    L            

 

             HGB (test code = HBG) 11.9 g/dL    12.0-15.5    L            

 

             HCT (test code = HCT) 37.6 %       35.0-44.0                 

 

             MCV (test code = MCV) 93.1 fL      81.0-99.0                 

 

             MCH (test code = MCH) 29.5 pg      27.0-31.0                 

 

             MCHC (test code = MCHC) 31.6 g/dL    32.0-36.0    L            

 

             RDW (test code = RDW) 13.8 %       11.5-14.5                 

 

             PLT (test code = PLT) 152 10\S\3/uL 130-400                   

 

             MPV (test code = MPV) 11.3 fL      9.4-12.4                  

 

             NEUTROP # (test code = NE#) 10.7 10\S\3/uL 1.6-8.0      H          

  

 

             LYMPH # (test code = LY#) 1.1 10\S\3/uL 1.1-3.5                   

 

             MONOCYTE # (test code = MO#) 0.6 10\S\3/uL 0.0-1.1                 

  

 

             EOSINOPH # (test code = EO#) 0.1 10\S\3/uL 0.0-0.7                 

  

 

             BASOPHIL # (test code = BA#) 0.0 10\S\3/uL 0.0-0.3                 

  

 

             IG # (test code = IG#) 0.06 10\S\3/uL 0.00-0.06                 

 

             NRBC # (test code = NRBC#) 0.00 10\S\3/uL 0.00-0.01                

 

 

             NEUTROPH % (test code = NE%) 85.2 %       35.0-73.0    H           

 

 

             LYMPH % (test code = LY%) 8.4 %        20.0-55.0    L            

 

             MONO % (test code = MO%) 5.0 %        2.5-10.0                  

 

             EOSINOPH % (test code = EO%) 0.6 %        0.0-5.0                  

 

 

             BASOPHIL % (test code = BA%) 0.3 %        0.0-2.0                  

 

 

             IG % (test code = IG%) 0.5 %        0.0-0.8                   

 

             NRBC% (test code = NRBC%) 0.0 %        0.0-0.2                   

 

             MANDIFF (test code = MDIFF) NO           NO                        



PTT (PARTIAL THROMBOPLASTIN TIME)2019 17:26:00





             Test Item    Value        Reference Range Interpretation Comments

 

             PTT (test code = 31.9 s       20.2-38.0                 



             PTT)                                                

 

             PTTH (test code = **** To monitor the                           



             PTTH)        effectiveness of heparin,                           



                          we offer the Anti-Xa                           



                          (Heparin Assay). It can be                           



                          used for either                           



                          unfractionated or LMW                           



                          Heparin. Order Code is                           



                          ANTI-XA ****                           



PROTHROMBIN [id number] 17:26:00





             Test Item    Value        Reference Range Interpretation Comments

 

             PT (test code = 12.0 s       9.8-13.6                  



             TT)                                                 

 

             INR (test code = 1.1                                    



             INR)                                                

 

             INRH (test code =  **** SUGGESTED THERAPEUTIC                      

     



             INRH)        RANGE FOR INR: **** 2.5 -                           



                          3.5 ** For Patients with                           



                          Prosthetic Valves or                           



                          Patients with recurrent                           



                          Thromboembolic Events **                           



                          2.0 - 3.0 ** For Most Other                           



                          Applications **                           



XR CHEST 1 VIEW [id number] 17:04:28Portable AP chest, 1 viewLocation 
Code: [id number] HISTORY: Chest painCOMPARISON: NoneCOMMENT: Mild atelectasis is
present within the lung bases. The costophrenic angles aresharp. The 
cardiomediastinalsilhouette is unremarkable. The bones are intact.IMPRESSION: 
Mild bibasilar atelectasis. Otherwise, no acute abnormalityCHEM [id number] 
16:51:00





             Test Item    Value        Reference Range Interpretation Comments

 

             Creatinine Lvl (test code = Creatinine 1.15         0.50-1.40      

           



             Lvl)                                                



[facility] [id number] 16:51:00





             Test Item    Value        Reference Range Interpretation Comments

 

             BUN (test code = BUN) 16                                 



[facility]2017-01-24 16:51:00





             Test Item    Value        Reference Range Interpretation Comments

 

             Chloride Lvl (test code = Chloride Lvl) 109                  

            



[facility]2017-01-24 16:51:00





             Test Item    Value        Reference Range Interpretation Comments

 

             Potassium Lvl (test code = Potassium 4.3          3.5-5.1          

         



             Lvl)                                                



[facility]2017-01-24 16:51:00





             Test Item    Value        Reference Range Interpretation Comments

 

             Sodium Lvl (test code = Sodium Lvl) 144          135-145           

        



[facility]2017-01-24 16:51:00





             Test Item    Value        Reference Range Interpretation Comments

 

             CO2 (test code = CO2) 28           -                     



[facility]2017-01-24 16:51:00





             Test Item    Value        Reference Range Interpretation Comments

 

             Calcium Lvl (test code = Calcium Lvl) 8.4          8.5-10.5        

          



[facility]2017-01-24 16:51:00





             Test Item    Value        Reference Range Interpretation Comments

 

             AGAP (test code = AGAP) 11.3         10.0-20.0                 



[facility]2017-01-24 16:51:00





             Test Item    Value        Reference Range Interpretation Comments

 

             Glucose Lvl (test code = Glucose Lvl) 109          70-99           

          



[facility]2017-01-24 16:51:00





             Test Item    Value        Reference Range Interpretation Comments

 

             eGFR (test code = eGFR) 52                                     



[facility]2017-01-24 16:51:00





             Test Item    Value        Reference Range Interpretation Comments

 

             Creatinine Lvl (test code = Creatinine 1.15         0.50-1.40      

           



             Lvl)                                                



[facility]2017-01-24 16:51:00





             Test Item    Value        Reference Range Interpretation Comments

 

             BUN (test code = BUN) 16           -                      



[facility]2017-01-24 16:51:00





             Test Item    Value        Reference Range Interpretation Comments

 

             Chloride Lvl (test code = Chloride Lvl) 109                  

            



[facility]2017-01-24 16:51:00





             Test Item    Value        Reference Range Interpretation Comments

 

             Potassium Lvl (test code = Potassium 4.3          3.5-5.1          

         



             Lvl)                                                



[facility]2017-01-24 16:51:00





             Test Item    Value        Reference Range Interpretation Comments

 

             Sodium Lvl (test code = Sodium Lvl) 144          135-145           

        



[facility]2017-01-24 16:51:00





             Test Item    Value        Reference Range Interpretation Comments

 

             CO2 (test code = CO2) 28           -                     



[facility]2017-01-24 16:51:00





             Test Item    Value        Reference Range Interpretation Comments

 

             Calcium Lvl (test code = Calcium Lvl) 8.4          8.5-10.5        

          



[facility]2017-01-24 16:51:00





             Test Item    Value        Reference Range Interpretation Comments

 

             AGAP (test code = AGAP) 11.3         10.0-20.0                 



[facility]2017-01-24 16:51:00





             Test Item    Value        Reference Range Interpretation Comments

 

             Glucose Lvl (test code = Glucose Lvl) 109          70-99           

          



[facility]2017-01-24 16:51:00





             Test Item    Value        Reference Range Interpretation Comments

 

             eGFR (test code = eGFR) 52                                     



[facility]2017-01-24 16:51:00





             Test Item    Value        Reference Range Interpretation Comments

 

             Creatinine Lvl (test code = Creatinine 1.15         0.50-1.40      

           



             Lvl)                                                



[facility]2017-01-24 16:51:00





             Test Item    Value        Reference Range Interpretation Comments

 

             BUN (test code = BUN) 16           -                      



[facility]2017-01-24 16:51:00





             Test Item    Value        Reference Range Interpretation Comments

 

             Chloride Lvl (test code = Chloride Lvl) 109                  

            



[facility]2017-01-24 16:51:00





             Test Item    Value        Reference Range Interpretation Comments

 

             Potassium Lvl (test code = Potassium 4.3          3.5-5.1          

         



             Lvl)                                                



[facility]2017-01-24 16:51:00





             Test Item    Value        Reference Range Interpretation Comments

 

             Sodium Lvl (test code = Sodium Lvl) 144          135-145           

        



[facility]2017-01-24 16:51:00





             Test Item    Value        Reference Range Interpretation Comments

 

             CO2 (test code = CO2) 28                                



[facility]2017-01-24 16:51:00





             Test Item    Value        Reference Range Interpretation Comments

 

             Calcium Lvl (test code = Calcium Lvl) 8.4          8.5-10.5        

          



[facility]2017-01-24 16:51:00





             Test Item    Value        Reference Range Interpretation Comments

 

             AGAP (test code = AGAP) 11.3         10.0-20.0                 



[facility]2017-01-24 16:51:00





             Test Item    Value        Reference Range Interpretation Comments

 

             Glucose Lvl (test code = Glucose Lvl) 109          70-99           

          



[facility]2017-01-24 16:51:00





             Test Item    Value        Reference Range Interpretation Comments

 

             eGFR (test code = eGFR) 52                                     



[facility]2017-01-24 16:51:00





             Test Item    Value        Reference Range Interpretation Comments

 

             Creatinine Lvl (test code = Creatinine 1.15         0.50-1.40      

           



             Lvl)                                                



[facility]2017-01-24 16:51:00





             Test Item    Value        Reference Range Interpretation Comments

 

             BUN (test code = BUN) 16           -                      



[facility]2017-01-24 16:51:00





             Test Item    Value        Reference Range Interpretation Comments

 

             Chloride Lvl (test code = Chloride Lvl) 109                  

            



[facility]2017-01-24 16:51:00





             Test Item    Value        Reference Range Interpretation Comments

 

             Potassium Lvl (test code = Potassium 4.3          3.5-5.1          

         



             Lvl)                                                



[facility]2017-01-24 16:51:00





             Test Item    Value        Reference Range Interpretation Comments

 

             Sodium Lvl (test code = Sodium Lvl) 144          135-145           

        



[facility]2017-01-24 16:51:00





             Test Item    Value        Reference Range Interpretation Comments

 

             CO2 (test code = CO2) 28           -32                     



[facility]2017-01-24 16:51:00





             Test Item    Value        Reference Range Interpretation Comments

 

             Calcium Lvl (test code = Calcium Lvl) 8.4          8.5-10.5        

          



[facility]2017-01-24 16:51:00





             Test Item    Value        Reference Range Interpretation Comments

 

             AGAP (test code = AGAP) 11.3         10.0-20.0                 



[facility]2017-01-24 16:51:00





             Test Item    Value        Reference Range Interpretation Comments

 

             Glucose Lvl (test code = Glucose Lvl) 109          70-99           

          



[facility]2017-01-24 16:51:00





             Test Item    Value        Reference Range Interpretation Comments

 

             eGFR (test code = eGFR) 52                                     



[facility]2017-01-24 16:51:00





             Test Item    Value        Reference Range Interpretation Comments

 

             Creatinine Lvl (test code = Creatinine 1.15         0.50-1.40      

           



             Lvl)                                                



[facility]2017-01-24 16:51:00





             Test Item    Value        Reference Range Interpretation Comments

 

             BUN (test code = BUN) 16                                 



[facility]2017-01-24 16:51:00





             Test Item    Value        Reference Range Interpretation Comments

 

             Chloride Lvl (test code = Chloride Lvl) 109                  

            



[facility]2017-01-24 16:51:00





             Test Item    Value        Reference Range Interpretation Comments

 

             Potassium Lvl (test code = Potassium 4.3          3.5-5.1          

         



             Lvl)                                                



[facility]2017-01-24 16:51:00





             Test Item    Value        Reference Range Interpretation Comments

 

             Sodium Lvl (test code = Sodium Lvl) 144          135-145           

        



[facility]2017-01-24 16:51:00





             Test Item    Value        Reference Range Interpretation Comments

 

             CO2 (test code = CO2)                      



[facility]2017-01-24 16:51:00





             Test Item    Value        Reference Range Interpretation Comments

 

             Calcium Lvl (test code = Calcium Lvl) 8.4          8.5-10.5        

          



[facility]2017-01-24 16:51:00





             Test Item    Value        Reference Range Interpretation Comments

 

             AGAP (test code = AGAP) 11.3         10.0-20.0                 



[facility]2017-01-24 16:51:00





             Test Item    Value        Reference Range Interpretation Comments

 

             Glucose Lvl (test code = Glucose Lvl) 109          70-99           

          



[facility]2017-01-24 16:51:00





             Test Item    Value        Reference Range Interpretation Comments

 

             eGFR (test code = eGFR) 52                                     



[facility]2017-01-24 16:51:00





             Test Item    Value        Reference Range Interpretation Comments

 

             Creatinine Lvl (test code = Creatinine 1.15         0.50-1.40      

           



             Lvl)                                                



[facility]2017-01-24 16:51:00





             Test Item    Value        Reference Range Interpretation Comments

 

             BUN (test code = BUN) 16                                 



[facility]2017-01-24 16:51:00





             Test Item    Value        Reference Range Interpretation Comments

 

             Chloride Lvl (test code = Chloride Lvl) 109                  

            



[facility]2017-01-24 16:51:00





             Test Item    Value        Reference Range Interpretation Comments

 

             Potassium Lvl (test code = Potassium 4.3          3.5-5.1          

         



             Lvl)                                                



[facility]2017-01-24 16:51:00





             Test Item    Value        Reference Range Interpretation Comments

 

             Sodium Lvl (test code = Sodium Lvl) 144          135-145           

        



[facility]2017-01-24 16:51:00





             Test Item    Value        Reference Range Interpretation Comments

 

             CO2 (test code = CO2)                      



[facility]2017-01-24 16:51:00





             Test Item    Value        Reference Range Interpretation Comments

 

             Calcium Lvl (test code = Calcium Lvl) 8.4          8.5-10.5        

          



[facility]2017-01-24 16:51:00





             Test Item    Value        Reference Range Interpretation Comments

 

             AGAP (test code = AGAP) 11.3         10.0-20.0                 



[facility]2017-01-24 16:51:00





             Test Item    Value        Reference Range Interpretation Comments

 

             Glucose Lvl (test code = Glucose Lvl) 109          70-99           

          



[facility]2017-01-24 16:51:00





             Test Item    Value        Reference Range Interpretation Comments

 

             eGFR (test code = eGFR) 52                                     



[facility]2017-01-24 16:51:00





             Test Item    Value        Reference Range Interpretation Comments

 

             Creatinine Lvl (test code = Creatinine 1.15         0.50-1.40      

           



             Lvl)                                                



[facility]2017-01-24 16:51:00





             Test Item    Value        Reference Range Interpretation Comments

 

             BUN (test code = BUN) 16           -                      



[facility]2017-01-24 16:51:00





             Test Item    Value        Reference Range Interpretation Comments

 

             Chloride Lvl (test code = Chloride Lvl) 109                  

            



[facility]2017-01-24 16:51:00





             Test Item    Value        Reference Range Interpretation Comments

 

             Potassium Lvl (test code = Potassium 4.3          3.5-5.1          

         



             Lvl)                                                



[facility]2017-01-24 16:51:00





             Test Item    Value        Reference Range Interpretation Comments

 

             Sodium Lvl (test code = Sodium Lvl) 144          135-145           

        



[facility]2017-01-24 16:51:00





             Test Item    Value        Reference Range Interpretation Comments

 

             CO2 (test code = CO2)                      



[facility]2017-01-24 16:51:00





             Test Item    Value        Reference Range Interpretation Comments

 

             Calcium Lvl (test code = Calcium Lvl) 8.4          8.5-10.5        

          



[facility]2017-01-24 16:51:00





             Test Item    Value        Reference Range Interpretation Comments

 

             AGAP (test code = AGAP) 11.3         10.0-20.0                 



[facility]2017-01-24 16:51:00





             Test Item    Value        Reference Range Interpretation Comments

 

             Glucose Lvl (test code = Glucose Lvl) 109          70-99           

          



[facility]2017-01-24 16:51:00





             Test Item    Value        Reference Range Interpretation Comments

 

             eGFR (test code = eGFR) 52                                     



[facility]

## 2023-01-04 NOTE — P.HP
Certification for Inpatient


Patient admitted to: Observation


With expected LOS: <2 Midnights


Patient will require the following post-hospital care: None


Practitioner: I am a practitioner with admitting privileges, knowledge of 

patient current condition, hospital course, and medical plan of care.


Services: Services provided to patient in accordance with Admission requirements

found in Title 42 Section 412.3 of the Code of Federal Regulations





Patient History


Date of Service: 01/04/23


Primary Care Provider: Brittni


Reason for admission: Nausea, esrd


History of Present Illness: 





Patient a frequent patient of mine.  She recently left [place] over the holiday 

weekend.  She states here home health nurse told her to come in for low sbp of 

105.   She had one low blood pressure in the ER  She complaints of nausea.  

states she has been vomiting for the last 3-4 days.  She has not vomited in the 

ER.  She has been compliant with dialysis.  She has  a clear chest xray 


Allergies





cefepime Allergy (Verified 01/05/22 06:30)


   Hives


codeine Allergy (Verified 07/30/22 05:03)


   Itching


levofloxacin Allergy (Verified 01/05/22 06:30)


   Hives/Rash


morphine Allergy (Verified 07/30/22 05:03)


   Itching


Penicillins Allergy (Verified 01/05/22 06:30)


   Hives/Rash


sulfamethoxazole [From Bactrim] Allergy (Verified 07/30/22 05:03)


   Hives


trimethoprim [From Bactrim] Allergy (Verified 07/30/22 05:03)


   Hives





Home Medications: 








Apixaban [Eliquis] 2.5 mg PO BID 07/29/22 


Bumetanide 1 tab PO DAILY 07/29/22 


Digoxin [Lanoxin] 1 tab PO T,TH,S 07/29/22 


Metoprolol Tartrate 1 tab PO BID 07/29/22 


Midodrine HCl 1 tab PO TID 07/29/22 


Pantoprazole [Protonix Tab*] 1 tab PO BID 07/29/22 


Sevelamer Carbonate [Renvela] 800 mg PO TID 07/29/22 


Dicyclomine [Bentyl*] 1 tab PO TID PRN 11/11/22 


Ondansetron [Zofran (Odt)*] 8 mg PO TID PRN 11/11/22 


Tizanidine [Zanaflex*] 1 tab PO BID 11/11/22 


Amiodarone HCl [Cordarone*] 200 mg PO BID 90 Days #180 tab 12/30/22 


Metoprolol Tartrate [Lopressor*] 12.5 mg PO BID 6AM 6PM 90 Days #180 tab 

12/30/22 








- Past Medical/Surgical History


Has patient received pneumonia vaccine in the past: Yes


Diabetic: No


-: Chronic hypotension


-: Hyperlipidemia


-: Chronic anticoagulation use


-: History of nephrolithiasis requiring stent placement


-: Recurrent UTI


-: End-stage renal disease


-: CHF


-: HTN


-: A-Fib


-: tubal ligation


-: dialysis port


-: cholecystectomy


-: right broken wrist


-: lasik


-: cataracts removed





- Family History


  ** Sister


-: Cancer


Notes: per pt, sister has lung cancer and is being treated for a "spot on her 

brain"





- Social History


Smoking Status: Never smoker


Alcohol use: No


CD- Drugs: No


Caffeine use: Yes





Review of Systems


Gastrointestinal: Nausea, Vomiting





Physical Examination





- Vital Signs


Temperature: 97.6 F


Blood Pressure: 121/85


Pulse: 106


Respirations: 18


Pulse Ox (%): 93





- Physical Exam


General: Alert, In no apparent distress


HEENT: Atraumatic, PERRLA, Mucous membr. moist/pink, EOMI, Sclerae nonicteric


Neck: Supple, 2+ carotid pulse no bruit, No LAD, Without JVD or thyroid 

abnormality


Respiratory: Clear to auscultation bilaterally, Normal air movement


Cardiovascular: Regular rate/rhythm, Normal S1 S2


Gastrointestinal: Normal bowel sounds, No tenderness


Musculoskeletal: No tenderness


Integumentary: No rashes


Neurological: Normal gait, Normal speech, Normal strength at 5/5 x4 extr, Normal

 tone, Normal affect


Lymphatics: No axilla or inguinal lymphadenopathy





- Studies


Laboratory Data (last 24 hrs)





01/04/23 10:57: PT 21.8 H, INR 1.98


01/04/23 10:57: WBC 9.40, Hgb 8.4 L, Hct 26.6 L, Plt Count 223


01/04/23 10:57: Sodium 138, Potassium 4.0, BUN 41 H, Creatinine 5.86 H*, Glucose

 127 H, Magnesium 2.2, Total Bilirubin 1.0, AST < 3 L, ALT < 10 L, Alkaline 

Phosphatase 135 H, Lipase 117








Assessment and Plan





- Problems (Diagnosis)


(1) Nausea & vomiting


Current Visit: Yes   Status: Acute   


Plan: 


will treat the patient with zofran.  She is doing better at this point. 


Qualifiers: 


   Vomiting type: unspecified   Qualified Code(s): R11.2 - Nausea with vomiting,

 unspecified   





(2) Atrial fibrillation


Current Visit: No   Status: Acute   


Plan: 


she is slightly elevated.  Will restart her digoxin and metoprolol


Qualifiers: 


   Atrial fibrillation type: paroxysmal 





(3) Back pain


Current Visit: No   Status: Chronic   


Plan: 


she has had tramadol in the past.  Will give her this with some IV morphine.  I 

think this is the main reason she has come in.  Her other symptoms do not seem 

as serious


Qualifiers: 


   Back pain location: low back pain   Chronicity: chronic 





(4) CHF (congestive heart failure)


Current Visit: No   Status: Chronic   


Plan: 


currently stable.  She has had dialysis and has had a clear xray.  


Qualifiers: 


   Heart failure type: diastolic   Heart failure chronicity: chronic   Qualified

 Code(s): I50.32 - Chronic diastolic (congestive) heart failure   





(5) ESRD (end stage renal disease) on dialysis


Current Visit: No   Status: Chronic   


Plan: 


consult to Dr. Medrano.  She is a TTS scheduled dialysis





Discharge Plan: Home


Plan to discharge in: 24 Hours





- Advance Directives


Does patient have a Living Will: No


Does patient have a Durable POA for Healthcare: No





- Code Status/Comfort Care


Code Status Assessed: Yes


Code Status: Full Code


Physician Review: Patient Assessed, Agree with Above Assessment and Plan


Critical Care: No


Time Spent Managing Pts Care (In Minutes): 25

## 2023-01-04 NOTE — RAD REPORT
EXAM DESCRIPTION:  RAD - Chest Single View - 1/4/2023 11:07 am

 

CLINICAL HISTORY:  COUGH

 

COMPARISON:  Portable 12/30/2022

 

TECHNIQUE:  AP portable chest image was obtained 1/4/2023 11:07 am .

 

FINDINGS:  Lung volumes remain low. Elevated right hemidiaphragm is seen. Loop recorder overlies the 
lower chest. Patient has a double-lumen dialysis catheter in place. These are stable findings.

 

No new or progressive lung parenchymal process. Posterior gutter on the right is obscured by the elev
ated hemidiaphragm. Significant failure or volume overload are doubtful. Heart size is prominent. No 
measurable pleural effusion and no pneumothorax. No acute bony abnormality seen. No acute aortic find
ings suspected.

 

IMPRESSION:  Limited portable study showing no significant failure or volume overload.

 

Chest is not significantly different from the 12/30/2022 imaging.

## 2023-01-05 VITALS — DIASTOLIC BLOOD PRESSURE: 82 MMHG | TEMPERATURE: 97.1 F | SYSTOLIC BLOOD PRESSURE: 122 MMHG

## 2023-01-05 VITALS — OXYGEN SATURATION: 99 %

## 2023-01-05 LAB
BUN BLD-MCNC: 49 MG/DL (ref 7–18)
GLUCOSE SERPLBLD-MCNC: 111 MG/DL (ref 74–106)
HCT VFR BLD CALC: 27.9 % (ref 36–45)
LYMPHOCYTES # SPEC AUTO: 1.1 K/UL (ref 0.7–4.9)
MCV RBC: 98 FL (ref 80–100)
PMV BLD: 8.1 FL (ref 7.6–11.3)
POTASSIUM SERPL-SCNC: 4.6 MMOL/L (ref 3.5–5.1)
RBC # BLD: 2.85 M/UL (ref 3.86–4.86)

## 2023-01-05 RX ADMIN — TRAMADOL HYDROCHLORIDE AND ACETAMINOPHEN SCH TAB: 37.5; 325 TABLET, FILM COATED ORAL at 09:24

## 2023-01-05 RX ADMIN — Medication SCH ML: at 09:24

## 2023-01-05 RX ADMIN — SEVELAMER CARBONATE SCH MG: 800 TABLET, FILM COATED ORAL at 11:52

## 2023-01-05 RX ADMIN — AMIODARONE HYDROCHLORIDE SCH MG: 200 TABLET ORAL at 09:23

## 2023-01-05 RX ADMIN — METOPROLOL TARTRATE SCH MG: 25 TABLET ORAL at 09:27

## 2023-01-05 RX ADMIN — APIXABAN SCH MG: 2.5 TABLET, FILM COATED ORAL at 09:24

## 2023-01-05 RX ADMIN — SEVELAMER CARBONATE SCH MG: 800 TABLET, FILM COATED ORAL at 09:23

## 2023-01-05 RX ADMIN — PANTOPRAZOLE SODIUM SCH MG: 40 TABLET, DELAYED RELEASE ORAL at 09:23

## 2023-01-05 RX ADMIN — TRAMADOL HYDROCHLORIDE AND ACETAMINOPHEN SCH TAB: 37.5; 325 TABLET, FILM COATED ORAL at 01:15

## 2023-01-05 NOTE — CON
Date of Consultation:  01/05/2023



Reason For Consultation:  Elevated BUN, creatinine, fluid management.



History Of Present Illness:  This is a pleasant 67-year-old female, well known to me from dialysis wi
th significant past medical history of end-stage renal disease, on hemodialysis, TTS; hypertension; h
yperlipidemia; nephrolithiasis.  Patient was in her regular state of health, came to the hospital com
plaining of nausea and vomiting.  Patient was treated symptomatically.  Her nausea subsided, vomiting
 resolved.  Patient is supposed to have dialysis today.  Lab showed elevation in BUN, creatinine, and
 hyponatremia.  For that reason, we have been consulted.



Past Medical History:  Includes:

1.Nephrolithiasis.

2.Hypertension.

3.Hyperlipidemia.

4.End-stage renal disease.



Past Surgical History:  Urethral stent placement.



Family History:  Positive for hypertension.



Social History:  Denies smoking, denies drinking, denies drug abuse.



Allergies:  CEFEPIME, LEVAQUIN, AND PENICILLIN.



Review of Systems:

Head and Neck:  No red eye.  No ear pain. 

GI:  Has nausea, vomiting.  No abdominal pain. 

:  No polyuria, no dysuria, no hematuria. 

GYN:  No vaginal discharge. 

RESPIRATORY:  No shortness of breath, on room air. 

CARDIOVASCULAR:  No chest pain. 

ENDOCRINE:  No polydipsia. 

SKIN:  No rash.



Home Medications:  Include Renvela, promethazine, pantoprazole, midodrine, gabapentin, Bumex 1 mg, El
iquis, amiodarone, and alendronate.



Current Medications:  In the hospital include amiodarone, digoxin, metoprolol 25 b.i.d., Tylenol, Leonid
flores 800 with each meal, ipratropium, pantoprazole, and Zofran.



Physical Examination:

Vital Signs:  When I saw the patient blood pressure 122/82, pulse of 82, afebrile. 

Chest:  Clear to auscultation. 

Heart:  S1, S2 regular. 

Abdomen:  Soft, nontender. 

Extremities:  Trace edema. 

Neuro:  Alert, oriented.  No focality.



Laboratory Data:  Sodium 134, potassium 4.6, bicarb 25, BUN 49, creatinine 6.2, calcium of 9.  Hemogl
obin of 8.6.



Assessment And Plan:  

1.End-stage renal disease with hyponatremia.  Normal volume with electrolyte imbalance.  We will arr
[place] for dialysis today.

2.Over volume, chronic, currently normal volume.  We will continue dialysis Tuesday, Thursday, and S
aturday.

3.Hyponatremia, dilutional, going to be corrected with dialysis.

4.Hypertension, controlled, optimal.  Continue current treatment.

5.Anemia of chronic kidney disease.  Continue LENARD.

6.Gastroenteritis.  Continue symptomatic treatment.  Follow up with primary.





KHUSHI

DD:  01/05/2023 11:08:40Voice ID:  [id number]

DT:  01/05/2023 16:31:22Report ID:  [id number]

## 2023-01-05 NOTE — EKG
Test Date:    2023-01-04               Test Time:    10:43:14

Technician:   MAGDALENA                                   

                                                     

MEASUREMENT RESULTS:                                       

Intervals:                                           

Rate:         94                                     

FL:                                                  

QRSD:         86                                     

QT:           348                                    

QTc:          435                                    

Axis:                                                

P:                                                   

FL:                                                  

QRS:          72                                     

T:            -53                                    

                                                     

INTERPRETIVE STATEMENTS:                                       

                                                     

Atrial fibrillation

Nonspecific ST and T wave abnormality

Abnormal ECG

Compared to ECG 12/28/2022 11:12:05

No significant changes



Electronically Signed On 01-05-23 15:22:09 CST by Venancio Barajas

## 2023-01-05 NOTE — P.DS
Admission Date: 01/04/23


Discharge Date: 01/05/23


Primary Care Provider: Brittni


Disposition: ROUTINE DISCHARGE


Discharge Condition: GOOD


Reason for Admission: Nausea, esrd





- Problems


(1) Nausea & vomiting


Current Visit: Yes   Status: Acute   


Qualifiers: 


   Vomiting type: unspecified   Qualified Code(s): R11.2 - Nausea with vomiting,

unspecified   





(2) Atrial fibrillation


Current Visit: No   Status: Acute   


Qualifiers: 


   Atrial fibrillation type: paroxysmal 





(3) Back pain


Current Visit: No   Status: Chronic   


Qualifiers: 


   Back pain location: low back pain   Chronicity: chronic 





(4) CHF (congestive heart failure)


Current Visit: No   Status: Chronic   


Qualifiers: 


   Heart failure type: diastolic   Heart failure chronicity: chronic   Qualified

Code(s): I50.32 - Chronic diastolic (congestive) heart failure   





(5) ESRD (end stage renal disease) on dialysis


Current Visit: No   Status: Chronic   


Brief History of Present Illness: 





Patient a frequent patient of mine.  She recently left a over the holiday 

weekend.  She states here home health nurse told her to come in for low sbp of 

105.   She had one low blood pressure in the ER  She complaints of nausea.  

states she has been vomiting for the last 3-4 days.  She has not vomited in the 

ER.  She has been compliant with dialysis.  She has  a clear chest xray 


Hospital Course: 





patient was admitted for nausea.  Her heart rate is controlled this morning.  

She is still complainting of nausea and has been having constipation.  Will have

her follow up in the office. Will try a trial of phenergan and linzess.  We may 

try other meds in the future. 


Vital Signs/Physical Exam: 














Temp Pulse Resp BP Pulse Ox


 


 97.1 F   86   20   104/64   94 


 


 01/05/23 04:00  01/05/23 04:00  01/05/23 04:00  01/05/23 04:00  01/05/23 04:00








General: Alert, In no apparent distress


HEENT: Atraumatic, PERRLA, EOMI


Neck: Supple, JVD not distended


Respiratory: Clear to auscultation bilaterally, Normal air movement


Cardiovascular: Regular rate/rhythm, Normal S1 S2


Gastrointestinal: Normal bowel sounds, No tenderness


Musculoskeletal: No tenderness


Integumentary: No rashes


Neurological: Normal speech, Normal tone, Normal affect


Lymphatics: No axilla or inguinal lymphadenopathy


Laboratory Data at Discharge: 














WBC  12.30 K/uL (4.3-10.9)  H  01/05/23  05:15    


 


Hgb  8.6 g/dL (12.0-15.0)  L  01/05/23  05:15    


 


Hct  27.9 % (36.0-45.0)  L  01/05/23  05:15    


 


Plt Count  244 K/uL (152-406)   01/05/23  05:15    


 


PT  21.8 SECONDS (9.5-12.5)  H  01/04/23  10:57    


 


INR  1.98   01/04/23  10:57    


 


Sodium  134 mmol/L (136-145)  L  01/05/23  05:15    


 


Potassium  4.6 mmol/L (3.5-5.1)  D 01/05/23  05:15    


 


BUN  49 mg/dL (7-18)  H  01/05/23  05:15    


 


Creatinine  6.21 mg/dL (0.55-1.02)  H*  01/05/23  05:15    


 


Glucose  111 mg/dL ()  H  01/05/23  05:15    


 


Magnesium  2.2 mg/dL (1.6-2.4)   01/04/23  10:57    


 


Total Bilirubin  1.0 mg/dL (0.2-1.0)   01/04/23  10:57    


 


AST  < 3 U/L (15-37)  L  01/04/23  10:57    


 


ALT  < 10 U/L (13-56)  L  01/04/23  10:57    


 


Alkaline Phosphatase  135 U/L ()  H  01/04/23  10:57    


 


Lipase  117 U/L ()   01/04/23  10:57    








Home Medications: 








Apixaban [Eliquis] 2.5 mg PO BID 07/29/22 


Bumetanide 1 tab PO DAILY 07/29/22 


Midodrine HCl 1 tab PO TID 07/29/22 


Pantoprazole [Protonix Tab*] 1 tab PO ONCE 07/29/22 


Sevelamer Carbonate [Renvela] 800 mg PO TID 07/29/22 


Amiodarone HCl [Cordarone*] 200 mg PO BID 90 Days #180 tab 12/30/22 


Alendronate Sodium 70 mg PO 1X 01/04/23 


Gabapentin 300 mg PO DAILY 01/04/23 


Linaclotide [Linzess] 72 mcg PO DAILY AFTER SUPPER 90 Days #90 tab 01/05/23 


Promethazine Tab [Phenergan] 25 mg PO Q6HP PRN 30 Days #90 tab 01/05/23 





New Medications: 


Linaclotide [Linzess] 72 mcg PO DAILY AFTER SUPPER 90 Days #90 tab


Promethazine Tab [Phenergan] 25 mg PO Q6HP PRN 30 Days #90 tab


 PRN Reason: Nausea / Vomiting


Diet: Renal


Activity: Ad susan


Followup: 


Anthony Elkins MD [Primary Care Provider] - 


Time spent managing pt's care (in minutes): 30

## 2023-01-10 ENCOUNTER — HOSPITAL ENCOUNTER (INPATIENT)
Dept: HOSPITAL 97 - ER | Age: 68
LOS: 1 days | Discharge: HOME | DRG: 314 | End: 2023-01-11
Attending: INTERNAL MEDICINE | Admitting: INTERNAL MEDICINE
Payer: COMMERCIAL

## 2023-01-10 VITALS — BODY MASS INDEX: 37.4 KG/M2

## 2023-01-10 DIAGNOSIS — Z99.2: ICD-10-CM

## 2023-01-10 DIAGNOSIS — K64.9: ICD-10-CM

## 2023-01-10 DIAGNOSIS — E87.1: ICD-10-CM

## 2023-01-10 DIAGNOSIS — E78.5: ICD-10-CM

## 2023-01-10 DIAGNOSIS — I12.0: ICD-10-CM

## 2023-01-10 DIAGNOSIS — I50.32: ICD-10-CM

## 2023-01-10 DIAGNOSIS — E87.6: ICD-10-CM

## 2023-01-10 DIAGNOSIS — Z88.3: ICD-10-CM

## 2023-01-10 DIAGNOSIS — J96.92: ICD-10-CM

## 2023-01-10 DIAGNOSIS — R01.1: ICD-10-CM

## 2023-01-10 DIAGNOSIS — Z79.01: ICD-10-CM

## 2023-01-10 DIAGNOSIS — Z88.8: ICD-10-CM

## 2023-01-10 DIAGNOSIS — Z90.49: ICD-10-CM

## 2023-01-10 DIAGNOSIS — I95.89: Primary | ICD-10-CM

## 2023-01-10 DIAGNOSIS — N18.6: ICD-10-CM

## 2023-01-10 DIAGNOSIS — N20.0: ICD-10-CM

## 2023-01-10 DIAGNOSIS — Z88.0: ICD-10-CM

## 2023-01-10 DIAGNOSIS — Z87.440: ICD-10-CM

## 2023-01-10 DIAGNOSIS — Z80.1: ICD-10-CM

## 2023-01-10 DIAGNOSIS — E11.22: ICD-10-CM

## 2023-01-10 DIAGNOSIS — Z79.899: ICD-10-CM

## 2023-01-10 DIAGNOSIS — I48.91: ICD-10-CM

## 2023-01-10 LAB
ALBUMIN SERPL BCP-MCNC: 3 G/DL (ref 3.4–5)
ALP SERPL-CCNC: 134 U/L (ref 45–117)
ALT SERPL W P-5'-P-CCNC: 12 U/L (ref 13–56)
ANISOCYTOSIS BLD QL: (no result)
AST SERPL W P-5'-P-CCNC: 5 U/L (ref 15–37)
BUN BLD-MCNC: 25 MG/DL (ref 7–18)
COHGB MFR BLDA: 1.2 % (ref 0–1.5)
COHGB MFR BLDA: 1.4 % (ref 0–1.5)
GLUCOSE SERPLBLD-MCNC: 108 MG/DL (ref 74–106)
HCT VFR BLD CALC: 28.5 % (ref 36–45)
INR BLD: 1.79
LYMPHOCYTES # SPEC AUTO: 0.9 K/UL (ref 0.7–4.9)
MAGNESIUM SERPL-MCNC: 2.1 MG/DL (ref 1.6–2.4)
MCV RBC: 98.5 FL (ref 80–100)
MORPHOLOGY BLD-IMP: (no result)
OXYHGB MFR BLDA: 36.5 % (ref 94–97)
OXYHGB MFR BLDA: 55.1 % (ref 94–97)
PMV BLD: 7.7 FL (ref 7.6–11.3)
POLYCHROMASIA BLD QL SMEAR: (no result)
POTASSIUM SERPL-SCNC: 3.3 MMOL/L (ref 3.5–5.1)
RBC # BLD: 2.89 M/UL (ref 3.86–4.86)
SAO2 % BLDA: 37.5 % (ref 92–98.5)
SAO2 % BLDA: 56.6 % (ref 92–98.5)
TROPONIN I SERPL HS-MCNC: 16.6 PG/ML (ref ?–58.9)

## 2023-01-10 PROCEDURE — 99284 EMERGENCY DEPT VISIT MOD MDM: CPT

## 2023-01-10 PROCEDURE — 71045 X-RAY EXAM CHEST 1 VIEW: CPT

## 2023-01-10 PROCEDURE — 5A09357 ASSISTANCE WITH RESPIRATORY VENTILATION, LESS THAN 24 CONSECUTIVE HOURS, CONTINUOUS POSITIVE AIRWAY PRESSURE: ICD-10-PCS

## 2023-01-10 PROCEDURE — 85610 PROTHROMBIN TIME: CPT

## 2023-01-10 PROCEDURE — 84484 ASSAY OF TROPONIN QUANT: CPT

## 2023-01-10 PROCEDURE — 80048 BASIC METABOLIC PNL TOTAL CA: CPT

## 2023-01-10 PROCEDURE — 82805 BLOOD GASES W/O2 SATURATION: CPT

## 2023-01-10 PROCEDURE — 83735 ASSAY OF MAGNESIUM: CPT

## 2023-01-10 PROCEDURE — 94660 CPAP INITIATION&MGMT: CPT

## 2023-01-10 PROCEDURE — 80076 HEPATIC FUNCTION PANEL: CPT

## 2023-01-10 PROCEDURE — 93005 ELECTROCARDIOGRAM TRACING: CPT

## 2023-01-10 PROCEDURE — 85025 COMPLETE CBC W/AUTO DIFF WBC: CPT

## 2023-01-10 PROCEDURE — 83880 ASSAY OF NATRIURETIC PEPTIDE: CPT

## 2023-01-10 PROCEDURE — 36415 COLL VENOUS BLD VENIPUNCTURE: CPT

## 2023-01-10 PROCEDURE — 87811 SARS-COV-2 COVID19 W/OPTIC: CPT

## 2023-01-10 RX ADMIN — Medication SCH ML: at 21:00

## 2023-01-10 NOTE — XMS REPORT
Continuity of Care Document

                           Created on:January 10, 2023



Patient:SUZI SEARS

Sex:Female

:1955

External Reference #:[id number]





Demographics







                          Address                   [address]



                                                    [place] [address]

 

                          Home Phone                [phone]

 

                          Work Phone                [phone]

 

                          Mobile Phone              [phone]

 

                          Email Address             [email or web address]

 

                          Preferred Language        English

 

                          Marital Status            Unknown

 

                          Protestant Affiliation     Unknown

 

                          Race                      Unknown

 

                          Additional Race(s)        Unavailable



                                                    White

 

                          Ethnic Group              Unknown









Author







                          Organization              [facility]

t

 

                          Address                   [address]



                                                    [place] [address]

 

                          Phone                     [phone]









Support







                Name            Relationship    Address         Phone

 

                JEIMY SEARS EDUARDO SIMON               [address] [phone]

[phone]



                                                [place] [address] 

 

                JEIMY SEARS SPOU            [address] [phone]



                                                [place] [address] 

 

                JEIMY SEARS SPOU            [address]       [phone]

[phone]



                                                [place] [address] 

 

                CHITRA SEARS    SPOU            [address]       [phone]



                                                [place] [address] 

 

                Jeimy Sears Spouse          [phone]

[phone]



                                                [place] [address] 

 

                Jonah Sears   Child           Unavailable     [phone]

 

                Kyler Sears   Child           Unavailable     [phone]









Care Team Providers







                    Name                Role                Phone

 

                    CHARISSE COYNE  Primary Care Physician Unavailable

 

                    Clark Cuellar   Attending Clinician Unavailable

 

                    Luma Beckham MD   Attending Clinician [phone]

 

                    Joanna WIGGINS, Sanket Dukes Attending Clinician [phone]

 

                    Vinayak Broussard MD   Attending Clinician [phone]

 

                    VINAYAK BROUSSARD      Attending Clinician Unavailable

 

                    Gregoria Pulido MA   Attending Clinician Unavailable

 

                    Miriam WIGGINS, Suersh Landry Attending Clinician [phone]

 

                    Lane WIGGINS, Zhen Steward Attending Clinician [phone]

[phone]

 

                    Faye Mitchell MD    Attending Clinician [phone]

 

                    Trinidad WIGGINS, Kumar Valadez Attending Clinician [phone]

Toni Sparks MD, Adriel Gale Attending Clinician [phone]

 

                    Vadim WIGGINS, Daylin   Attending Clinician [phone]

 

                    Jacobo WIGGINS, Quinton Gonsalez Attending Clinician [phone]

[phone]

 

                    Jean-Claude WIGGINS, Martina  Attending Clinician [phone]

 

                    Louann Odom MA Attending Clinician Unavailable

 

                    Venancio Barajas      Attending Clinician Unavailable

 

                    Ceferino_OPAL         Attending Clinician Unavailable

 

                    Addison WIGGINS, Louann Carrizales Attending Clinician [phone]

 

                    Aki Dupree MD      Attending Clinician [phone]

 

                    Luc WIGGINS, Shelby Attending Clinician [phone]

 

                    Fantasma Squires       Attending Clinician [phone]

 

                    Charisse Coyne  Attending Clinician [phone]

 

                    Maureen WIGGINS, Martha Chu Attending Clinician [phone]

[phone]

 

                    Parveen Hutchinson MD Attending Clinician [phone]

 

                    Shaikh ROSALIE, Rosalio     Attending Clinician [phone]

 

                    Christine Saini DO Attending Clinician [phone]

 

                    Edinson WIGGINS, Trev Hays Attending Clinician [phone]

 

                    Hasmukh WIGGINS, Mariluz Sanchez Attending Clinician [phone]

 

                    Suzie Dominguez       Attending Clinician Unavailable

 

                    Hadley Medina         Attending Clinician Unavailable

 

                    Antionette MAGALLANES, Joana      Attending Clinician Unavailable

 

                    Nieves WIGGINS, Melissa PHAM Attending Clinician [phone]

[phone]

 

                    Lazaro WIGGINS, Naseem Attending Clinician [phone]

 

                    Elaina Clark MD Attending Clinician [phone]

 

                    Brenna Jacobs MD Attending Clinician [phone]

 

                    DEBBY_EVELIN_Alec_J      Attending Clinician Unavailable

 

                    MD MARTHA LAMAR Attending Clinician Unavailable

 

                    JULIANNA KEANE  Attending Clinician Unavailable

 

                    MAGY EARL     Attending Clinician Unavailable

 

                    MELVIN MANZO   Attending Clinician Unavailable

 

                    MD MELVIN MANZO Attending Clinician Unavailable

 

                    MD DAYLIN LITTLE OBIOMA Attending Clinician Unavailable

 

                    MD MELISSA PICKETT Attending Clinician Unavailable

 

                    BRENNA JACOBS    Attending Clinician Unavailable

 

                    Brenna Jacobs    Attending Clinician [phone]

 

                    Abdi Schulz Attending Clinician [phone]

 

                    Gabriela Rosenthal Attending Clinician [phone]

 

                    EMILIA BONILLA        Attending Clinician Unavailable

 

                    DR GOPAL ADEN    Attending Clinician Unavailable

 

                    Yousif Rhodes Attending Clinician [phone]

 

                    Randy Cunningham   Attending Clinician [phone]

 

                    Yoan Lei     Attending Clinician [phone]

 

                    Abbi Somers  Attending Clinician [phone]

 

                    Vignesh See Attending Clinician [phone]

 

                    Charisse Coyne  Admitting Clinician Unavailable

 

                    LUMA BECKHAM      Admitting Clinician Unavailable

 

                    ZHEN JENKINS     Admitting Clinician Unavailable

 

                    MARTINA DOUGLASS     Admitting Clinician Unavailable

 

                    Anthony Elkins     Admitting Clinician Unavailable

 

                    Lisag_OPAL         Admitting Clinician Unavailable

 

                    AKI DUPREE         Admitting Clinician Unavailable

 

                    CHRISTINE SAINI       Admitting Clinician Unavailable

 

                    MD ROSALIO ANGULO     Admitting Clinician Unavailable

 

                    MELISSA PICKETT     Admitting Clinician Unavailable

 

                    DEBBY_EVELIN_Alec_EDUARDO      Admitting Clinician Unavailable

 

                    MD MARTHA LAMAR Admitting Clinician Unavailable

 

                    MELVIN MANZO   Admitting Clinician Unavailable

 

                    MD MELVIN MANZO Admitting Clinician Unavailable

 

                    DAYLIN LITTLE      Admitting Clinician Unavailable

 

                    MD KAUSHIK BECKHAM   Admitting Clinician Unavailable

 

                    MD MELSISA PICKETT Admitting Clinician Unavailable

 

                    DR GOPAL ADEN    Admitting Clinician Unavailable









Payers







           Payer Name Policy Type Policy Number Effective Date Expiration Date S

maryam

 

           [place]:            [id number] 2021            



           [employer]                       00:00:00              







Problems







       Condition Condition Condition Status Onset  Resolution Last   Treating Co

mments 

Source



       Name   Details Category        Date   Date   Treatment Clinician        



                                                 Date                 

 

       Hypotensio Hypotensio Disease Active 2022                             [facility]

[facility]



       [facility]



                                   [facility]



                                   [facility]                                 [facility]

 

       Chest pain Chest pain Disease Active 2022                             M

ethodi



       with high with high               1-05                               st



       risk of risk of               [facility]:00:                             [facility]



       acute  acute                00                                 l



       coronary coronary                                                  



       syndrome syndrome                                                  

 

       Hyperkalem Hyperkalem Disease Active                              M

ethodi



       ia     ia                                                  st



                                   00:00:                             [facility]ospita



                                   00                                 l

 

       Fluid  Fluid  Disease Active                              Methodi



       overload overload                                              st



                                   00:00:                             Hospita



                                   00                                 l

 

       COVID-19 COVID-19 Disease Active                              [facility]

di



       virus  virus                                               st



       detected detected               00:00:                             Hospit

a



                                   00                                 l

 

       Acute back Acute back Disease Active                              M

ethodi



       pain   pain                                                st



                                   00:00:                             [facility]ospita



                                   00                                 l

 

       Sepsis Sepsis Disease Active                              Methodi



                                                                  st



                                   00:00:                             Hospita



                                   00                                 l

 

       ARF (acute ARF (acute Disease Active                              M

ethodi



       renal  renal                                               st



       failure) failure)               00:00:                             Hospit

a



                                   00                                 l

 

       Hypoxemia Hypoxemia Disease Active                              Met

hodi



                                                                  st



                                   00:00:                             Hospita



                                   00                                 l

 

       Pulmonary Pulmonary Disease Active                              Met

hodi



       edema  edema                                               st



                                   00:00:                             Hospita



                                   00                                 l

 

       UTI    UTI    Disease Active                              Methodi



       (urinary (urinary                                              st



       tract  tract                00:00:                             Hospita



       infection) infection)               00                                 l

 

       Shortness Shortness Disease Active                              Met

hodi



       of breath of breath                                              st



                                   00:00:                             Hospita



                                   00                                 l

 

       Hyperlipid Hyperlipid Disease Active                       Overview

: Methodi



       emia   emia                                         Formattin st



                                   00:00:                      g of this Hospita



                                   00                          note   l



                                                               might be 



                                                               different 



                                                               from the 



                                                               original. 



                                                               Data   



                                                               migrated 



                                                               from [employer] 



                                                               [employer] 



                                                               y on   



                                                               5/30/15.D 



                                                               nancie    



                                                               migrated 



                                                               from [employer] 



                                                               [employer] 



                                                               y on   



                                                               5/30/15.D 



                                                               nancie    



                                                               migrated 



                                                               from [employer] 



                                                               [employer] 



                                                               y on   



                                                               5/30/15.D 



                                                               nancie    



                                                               migrated 



                                                               from [employer] 



                                                               [employer] 



                                                               y on   



                                                               5/30/15.D 



                                                               nancie    



                                                               migrated 



                                                               from [employer] 



                                                               [employer] 



                                                               y on   



                                                               5/30/15. 

 

       Hyperparat Hyperparat Disease Active                              M

ethodi



       hyroidism hyroidism                                              st



                                   00:00:                             Hospita



                                   00                                 l

 

       Hyperurice Hyperurice Disease Active                       Overview

: Methodi



       keegan    keegan                                          Formattin st



                                   00:00:                      g of this Hospita



                                   00                          note   l



                                                               might be 



                                                               different 



                                                               from the 



                                                               original. 



                                                               Data   



                                                               migrated 



                                                               from [employer] 



                                                               [employer] 



                                                               y on   



                                                               5/30/15.D 



                                                               nancie    



                                                               migrated 



                                                               from [employer] 



                                                               [employer] 



                                                               y on   



                                                               5/30/15.D 



                                                               nancie    



                                                               migrated 



                                                               from [employer] 



                                                               [employer] 



                                                               y on   



                                                               5/30/15.D 



                                                               nancie    



                                                               migrated 



                                                               from [employer] 



                                                               [employer] 



                                                               y on   



                                                               5/30/15.D 



                                                               nancie    



                                                               migrated 



                                                               from [employer] 



                                                               [employer] 



                                                               y on   



                                                               5/30/15. 

 

       Abnormal Abnormal Disease Active                              [facility]

di



       urinalysis urinalysis               



                                   00:00:                             Hospita



                                   00                                 l

 

       Abnormal Abnormal Disease Active                              [facility]

di



       gait   gait                 



                                   00:00:                             Hospita



                                   00                                 l

 

       Ankle  Ankle  Disease Active                              Methodi



       swelling swelling               



                                   00:00:                             Hospita



                                   00                                 l

 

       Atrial Atrial Disease Active                              Methodi



       fibrillati fibrillati               



       on     on                   00:00:                             Hospita



                                   00                                 l

 

       Chronic Chronic Disease Active                              Methodi



       venous venous               



       stasis stasis               00:00:                             Hospita



                                   00                                 l

 

       Venous Venous Disease Active                              Methodi



       stasis stasis               



       ulcer of ulcer of               00:00:                             Hospit

a



       lower  lower                00                                 l



       extremity extremity                                                  

 

       Weight Weight Disease Active                              Methodi



       gain   gain                 



                                   00:00:                             Hospita



                                   00                                 l

 

       Pain in Pain in Disease Active                              Methodi



       wrist  wrist                



                                   00:00:                             Hospita



                                   00                                 l

 

       Prerenal Prerenal Disease Active                              [facility]

di



       azotemia azotemia               



                                   00:00:                             Hospita



                                   00                                 l

 

       Proteinuri Proteinuri Disease Active                              M

ethodi



       a      a                    



                                   00:00:                             Hospita



                                   00                                 l

 

       Peripheral Peripheral Disease Active                              M

ethodi



       venous venous               



       insufficie insufficie               00:00:                             Ho

spita



       ncy    ncy                  00                                 l

 

       Malignant Malignant Disease Active                              Met

hodi



       neoplasm neoplasm               



       of skin of of skin of               00:00:                             Ho

spita



       upper  upper                00                                 l



       extremity extremity                                                  

 

       Migraine Migraine Disease Active                              [facility]

di



       headache headache               



                                   00:00:                             Hospita



                                   00                                 l

 

       Diabetes Diabetes Disease Active                              [facility]

di



       mellitus mellitus               



                                   00:00:                             Hospita



                                   00                                 l

 

       Dysuria Dysuria Disease Active                              Methodi



                                                                  st



                                   00:00:                             Hospita



                                   00                                 l

 

       Edema  Edema  Disease Active                              Methodi



                                                                  st



                                   00:00:                             Hospita



                                   00                                 l

 

       Abdominal Abdominal Disease Active                              Met

hodi



       pain   pain                                                st



                                   00:00:                             Hospita



                                   00                                 l

 

       CHF    CHF    Disease Active                              Methodi



       (congestiv (congestiv                                              st



       e heart e heart               00:00:                             Hospita



       failure) failure)               00                                 l

 

       Flank pain Flank pain Disease Active                              M

ethodi



                                   [address]                               [address]



                                   00:00:                             [facility]



                                   00                                 l

 

       Renal  Renal  Disease Active                       Overview: Method

i



       stone  stone                [address]                        [address]



                                   00:00:                      g of this Hospita



                                   00                          note   l



                                                               might be 



                                                               different 



                                                               from the 



                                                               original. 



                                                               Added  



                                                               automatic 



                                                               ally from 



                                                               request 



                                                               for    



                                                               surgery 



                                                               [id number] 

 

       SOLO     SOLO   Diagnosis Active 2021               [facility]

[facility]



              Active                         12:03:00               l



              2021               00:00:                             Don[facility]

n



               [facility]               00                                 



              [place]                                                    

 

       Symptomati Symptomati Disease Active                       Overview

: Methodi



       c anemia c anemia               4-15                        Formattin st



                                   00:00:                      g of this [facility]ospita



                                   00                          note   l



                                                               might be 



                                                               different 



                                                               from the 



                                                               original. 



                                                               Added  



                                                               automatic 



                                                               ally from 



                                                               request 



                                                               for    



                                                               surgery 



                                                               [id number] 

 

       Acute  Acute  Disease Active                              Methodi



       gallstone gallstone               312                               st



       pancreatit pancreatit               00:00:                             Ho

spita



       is     is                   00                                 l

 

       Hypervolem Hypervolem Disease Active                              M

ethodi



       ia     ia                                                  st



                                   00:00:                             [facility]osp[facility]



                                   00                                 l

 

       Hypotensio Hypotensio Disease Active 2020                             M

ethodi



       n      n                    2                               st



                                   00:00:                             [facility]



                                   00                                 l

 

       Respirator Respirator Disease Active 2020                             M

ethodi



       y failure y failure               1-16                               st



                                   00:00:                             [facility]ospita



                                   00                                 l

 

       Acute  Acute  Disease Active 2020                             Methodi



       cystitis cystitis               1-13                               st



       without without               00:00:                             [facility]



       hematuria hematuria               00                                 l

 

       Class 3 Class 3 Disease Active 2020                             Methodi



       severe severe               0-                               st



       obesity obesity               00:00:                             [facility]osp[facility]



       without without               00                                 l



       serious serious                                                  



       comorbidit comorbidit                                                  



       y with y with                                                  



       body mass body mass                                                  



       index  index                                                   



       (BMI) of (BMI) of                                                  



       60.0 to 60.0 to                                                  



       69.9 in 69.9 in                                                  



       adult  adult                                                   

 

       Acute  Acute  Disease Active 2020                             Methodi



       renal  renal                0-26                               st



       failure failure               00:00:                             [facility]ospita



       (ARF)  (ARF)                00                                 l

 

       Acute  Acute  Disease Active 2020                             Methodi



       respirator respirator               0-26                               st



       y failure y failure               00:00:                             Hosp

mimi



       with   with                 00                                 l



       hypoxia hypoxia                                                  



       and    and                                                     



       hypercapni hypercapni                                                  



       a      a                                                       

 

       Acute  Acute  Disease Active 2020                             Methodi



       congestive congestive               0-24                               st



       heart  heart                00:00:                             [facility]osp[facility]



       failure failure               00                                 l

 

       Left foot Left foot Disease Active 2019                             Met

hodi



       infection infection               0-15                               st



                                   00:00:                             [facility]osp[facility]



                                   00                                 l

 

       Cellulitis Cellulitis Disease Active                              M

ethodi



       of left of left               915                               st



       leg    leg                  00:00:                             [facility]osp[facility]



                                   00                                 l

 

       Bacteremia Bacteremia Disease Active                              M

ethodi



       due to due to               9                               st



       Pseudomona Pseudomona               00:00:                             Ho

selvin



       s      s                    00                                 l

 

       COLON   COLON Diagnosis Active 2017               [facility]

[place]



       CANCER CANCER                         05:49:00               l



       SCREENING- SCREENING-               00:00:                             Neel abrahamann



       Z12.11 Z12.11               00                                 



              Active                                                  



              2017                                                  



                [place]                                                  



              [place]                                                    

 

       R92.1 -  R92.1 - Diagnosis Active 2016               

[facility]



       MAMMOGRAPH MAMMOGRAPH                         15:55:00               

l



       IC     IC                   00:01:                             [facility]



       CALCIFCN CALCIFCN               00                                 



       FOUND ON FOUND ON                                                  



              Active                                                  



              2016                                                  



                [facility]                                                  



              [place]                                                  

 

       Z12.31 -   Z12.31 - Diagnosis Active 2016            

   [facility]



       ENCNTR ENCNTR               -          07:08:00               l



       SCREEN SCREEN               00:01:                             [facility]



       MAMMOGRAM MAMMOGRAM               00                                 



       FOR MA FOR MA                                                  



              Active                                                  



              2016                                                  



                [facility]                                                  



              [place]                                                  

 

       RT WRIST  RT WRIST Diagnosis Active 2017             

  [facility]



       DISTAL DISTAL               1-          02:03:00               l



       RADIUS RADIUS               09:00:                             [facility]



       CLSD FX CLSD FX               00                                 



              Active                                                  



              2016                                                  



              [facility]                                                  



              Bone &                                                  



              Joint                                                   

 

       Abnormal Abnormal Disease Active                       Overview: Me

thodi



       glucose glucose               7-02                        Formattin st



       level  level                00:00:                      g of this Hospita



                                   00                          note   l



                                                               might be 



                                                               different 



                                                               from the 



                                                               original. 



                                                               Data   



                                                               migrated 



                                                               from [facility] 



                                                               [facility] 



                                                               y on   



                                                               7/8/15.Da 



                                                               ta     



                                                               migrated 



                                                               from [facility] 



                                                               [facility] 



                                                               y on   



                                                               7/8/15.Da 



                                                               ta     



                                                               migrated 



                                                               from [facility] 



                                                               [facility] 



                                                               y on   



                                                               7/8/15. 

 

       Lymphedema Lymphedema Disease Active                       Overview

: Methodi



                                   2-04                        Formatt[address]



                                   00:00:                      g of this Hospita



                                   00                          note   l



                                                               might be 



                                                               different 



                                                               from the 



                                                               original. 



                                                               Data   



                                                               migrated 



                                                               from [facility] 



                                                               [facility] 



                                                               y on   



                                                               5/30/15. 

 

       Obstructiv Obstructiv Disease Active                       Overview

: Methodi



       e sleep e sleep               2-04                        Formattin st



       apnea  apnea                00:00:                      g of this [facility]ospita



       syndrome syndrome               00                          note   l



                                                               might be 



                                                               different 



                                                               from the 



                                                               original. 



                                                               Data   



                                                               migrated 



                                                               from [facility] 



                                                               [facility] 



                                                               y on   



                                                               5/30/15.D 



                                                               nancie    



                                                               migrated 



                                                               from [facility] 



                                                               [facility] 



                                                               y on   



                                                               5/30/15.D 



                                                               nancie    



                                                               migrated 



                                                               from [facility] 



                                                               [facility] 



                                                               y on   



                                                               5/30/15.D 



                                                               nancie    



                                                               migrated 



                                                               from [facility] 



                                                               [facility] 



                                                               y on   



                                                               5/30/15.D 



                                                               nancie    



                                                               migrated 



                                                               from [facility] 



                                                               [facility] 



                                                               y on   



                                                               5/30/15.D 



                                                               nnacie    



                                                               migrated 



                                                               from [facility] 



                                                               [facility] 



                                                               y on   



                                                               5/30/15. 

 

       Hypothyroi Hypothyroi Disease Active                       Overview

: Methodi



       dism   dism                 4-24                        Formatt[address]



                                   00:00:                      g of this Hospita



                                   00                          note   l



                                                               might be 



                                                               different 



                                                               from the 



                                                               original. 



                                                               Data   



                                                               migrated 



                                                               from [facility] 



                                                               [facility] 



                                                               y on   



                                                               5/30/15. 

 

       Depressive Depressive Disease Active                       Overview

: Methodi



       disorder disorder               4-24                        [address]



                                   00:00:                      g of this Hospita



                                   00                          note   l



                                                               might be 



                                                               different 



                                                               from the 



                                                               original. 



                                                               Data   



                                                               migrated 



                                                               from [facility] 



                                                               [facility] 



                                                               y on   



                                                               5/30/15.D 



                                                               nancie    



                                                               migrated 



                                                               from [facility] 



                                                               [facility] 



                                                               y on   



                                                               5/30/15.D 



                                                               nancie    



                                                               migrated 



                                                               from [facility] 



                                                               [facility] 



                                                               y on   



                                                               5/30/15.D 



                                                               nancie    



                                                               migrated 



                                                               from [facility] 



                                                               [facility] 



                                                               y on   



                                                               5/30/15. 

 

       Obesity  Obesity Problem Active 2016               [facility]

moria



       (disorder) (disorder)               8-20          00:23:22               

l



              Active               00:00:                             [facility]



              2012               00                                 



               Problem                                                  



              2016                                                  



              Data                                                    



              migrated                                                  



              from [facility]                                                  



              [facility]                                                  



              on                                                      



              5/30/15.                                                  



              [facility]                                                  



              [facility]                                                  



              [facility]                                                  



              [place]                                                     



              [place]                                                  



              [facility]                                                  



              [facility]                                                  



              Bone &                                                  



              Joint                                                   

 

       Cobalamin Cobalamin Disease Active                       Overview: 

Methodi



       deficiency deficiency               7-11                        Formattin

 st



                                   00:00:                      g of this Hospita



                                   00                          note   l



                                                               might be 



                                                               different 



                                                               from the 



                                                               original. 



                                                               Data   



                                                               migrated 



                                                               from [facility] 



                                                               [facility] 



                                                               y on   



                                                               5/30/15.D 



                                                               nancie    



                                                               migrated 



                                                               from [facility] 



                                                               [facility] 



                                                               y on   



                                                               5/30/15.D 



                                                               nancie    



                                                               migrated 



                                                               from [facility] 



                                                               [facility] 



                                                               y on   



                                                               5/30/15.D 



                                                               nancie    



                                                               migrated 



                                                               from [facility] 



                                                               [facility] 



                                                               y on   



                                                               5/30/15. 

 

       Hypertensi  Hypertens Problem Active -2016            

   [facility]



       ve episode kyle                  7-10          00:23:22               l



       (disorder) episode               00:00:                             [place]

nn



              (disorder)               00                                 



               Active                                                  



              07/10/2012                                                  



              Problem                                                  



              2016                                                  



              Data                                                    



              migrated                                                  



              from [employer]                                                  



              [employer]                                                  



              on                                                      



              5/30/15.                                                  



               [facility]                                                  



              Tessa,                                                  



              [facility]                                                  



              [place],                                                  



              [facility]                                                     



              [place]                                                  



              [facility]                                                  



              [place]                                                  



              Bone &                                                  



              Joint                                                   

 

       Calcificat  Calcifica Problem Resolve               2022           

    [facility]



       ion of tion of        d                    03:11:55               l



       breast breast                                                  [place]



       (finding) (finding)                                                  



              Resolved                                                  



              Problem                                                  



              2022                                                  



              Left                                                   



              Medical                                                  



              Group,                                                  



              [facility]                                                    



              [place],                                                  



              [facility] [place]                                                  



              Land,                                                  



              [facility]                                                     



              [place]                                                  



              Trace,[facility]                                                  



              [place]                                                  



              [facility]one &                                                  



              Joint,                                                  



              [place]                                                  

 

       Acute   Acute Problem Active               2020               [facility]

ia



       urinary urinary                             22:36:35               l



       tract  tract                                                   [facility]



       infection infection                                                  



       (disorder) (disorder)                                                  



              Active                                                  



              Problem                                                  



              2020                                                  



               Medical                                                  



              Group                                                   

 

       Chronic  Chronic Problem Active               2020               Me

moria



       renal  renal                              22:36:35               l



       impairment impairment                                                  He

rmann



       (disorder) (disorder)                                                  



              Active                                                  



              Problem                                                  



              2020                                                  



               Medical                                                  



              Group,                                                  



              [facility]                                                    



              [place],                                                  



              [facility] [place]                                                  



              Land,                                                  



              [facility]                                                     



              [place]                                                  



              [facility]                                                  



              [place]                                                  



              Bone &                                                  



              Joint,                                                  



              [place]                                                  

 

       Benign  Benign Problem Active               2022               [place]

milan



       essential essential                             03:11:55               l



       hypertensi hypertensi                                                  He

rmann



       on     on                                                      



       (disorder) (disorder)                                                  



               Active                                                  



              Problem                                                  



              2022                                                  



               Medical                                                  



              Group,                                                  



              [facility]                                                    



              [place],                                                  



              [facility] [place]                                                  



              Land,                                                  



              [facility]                                                     



              [place]                                                  



              [facility]                                                  



              [place]                                                  



              Bone &                                                  



              Joint,                                                  



              [place]                                                  

 

       Cardiorena        Problem Active               2022               M

[facility]



       l syndrome Cardiorena                             03:11:55               

l



       (disorder) l syndrome                                                  He

rmann



              (disorder)                                                  



              Active                                                  



              Problem                                                  



              2022                                                  



                                                                    



              Medical                                                  



              Group,                                                  



              [facility] [place]                                                  



              Land,                                                  



              [place]                                                  

 

       Chronic  Chronic Problem Active               2022               Me

moria



       kidney kidney                             03:11:55               l



       disease disease                                                  [place]



       (disorder) (disorder)                                                  



              Active                                                  



              Problem                                                  



              2022                                                  



                                                                    



              Medical                                                  



              Group,                                                  



              [facility] [place]                                                  



              Land,                                                  



              [place]                                                  

 

       Chronic  Chronic Problem Active               2022               Me

moria



       kidney kidney                             03:11:55               l



       disease disease                                                  [place]



       stage 3 stage 3                                                  



       (disorder) (disorder)                                                  



              Active                                                  



              Problem                                                  



              2022                                                  



                                                                    



              Medical                                                  



              Group,                                                  



              [facility] [place]                                                  



              Land,                                                  



              [place]                                                  

 

       Chronic  Chronic Problem Active               2022               Me

moria



       pain   pain                               03:11:55               l



       (finding) (finding)                                                  [facility]

daryl



              Active                                                  



              Problem                                                  



              2022                                                  



               Medical                                                  



              Group,                                                  



              [facility] [place]                                                  



              Land,                                                  



              [place]                                                  

 

       Dependence  Dependenc Problem Active               2022            

   [facility]



       on     e on                               03:11:55               l



       supplement supplement                                                  He

adrienne



       al oxygen al oxygen                                                  



       (finding) (finding)                                                  



              Active                                                  



              Problem                                                  



              2022                                                  



               Medical                                                  



              Group,                                                  



              [place]                                                  

 

       Edema of  Edema of Problem Active               2022               

[facility]



       lower  lower                              03:11:55               l



       extremity extremity                                                  [place]

daryl



       (finding) (finding)                                                  



              Active                                                  



              Problem                                                  



              2022                                                  



               Medical                                                  



              Group,                                                  



              [facility] [place]                                                  



              Land,                                                  



              [place]                                                  

 

       Hypertensi  Hypertens Problem Active               2022            

   [facility]



       ve     kyle                                03:11:55               l



       disorder, disorder,                                                  [facility]

daryl



       systemic systemic                                                  



       arterial arterial                                                  



       (disorder) (disorder)                                                  



              Active                                                  



              Problem                                                  



              2022                                                  



               Medical                                                  



              Group,[facility]                                                  



              ical                                                    



              Specialty                                                  



              [facility]                                                  



              [facility]                                                  



              Land,                                                  



              [facility]                                                  



              Land,                                                  



              [place]                                                  

 

       Hypertensi        Problem Active               2022               M

[facility]



       ve renal Hypertensi                             03:11:55               l



       disease ve renal                                                  [place]



       (disorder) disease                                                  



              (disorder)                                                  



              Active                                                  



              Problem                                                  



              2022                                                  



                                                                    



              Medical                                                  



              Group,                                                  



              [facility] [place]                                                  



              Land,                                                  



              [place]                                                  

 

       Lymphedema        Problem Active               2022               M

[facility]oria



       of lower Lymphedema                             03:11:55               l



       extremity of lower                                                  [place]

nn



       (disorder) extremity                                                  



              (disorder)                                                  



              Active                                                  



              Problem                                                  



              2022                                                  



                                                                    



              Medical                                                  



              Group,                                                  



              [facility]                                                  



              Land,                                                  



              [place]                                                  

 

       Morbid   Morbid Problem Active               2022               Mem

oria



       obesity obesity                             03:11:55               l



       (disorder) (disorder)                                                  He

rmann



              Active                                                  



              Problem                                                  



              2022                                                  



                                                                    



              Medical                                                  



              Group,                                                  



              [facility]                                                    



              [facility],                                                  



              [facility] [place]                                                  



              Land,[facility]                                                     



              [place]                                                  



              Trace,[facility]                                                  



              [place]                                                  



              [facility]one &                                                  



              Joint,                                                  



              [place]                                                  

 

       Post-disch  Post-disc Problem Active               2022            

   [facility]



       arge   harge                              03:11:55               l



       follow-up follow-up                                                  Abraham brito



       (finding) (finding)                                                  



              Active                                                  



              Problem                                                  



              2022                                                  



               Medical                                                  



              Group,                                                  



              [place]                                                  

 

       Stasis   Stasis Problem Active               2022               Mem

oria



       ulcer  ulcer                              03:11:55               l



       (disorder) (disorder)                                                  He

rmann



              Active                                                  



              Problem                                                  



              2022                                                  



                                                                    



              Medical                                                  



              Group,                                                  



              [facility] [place]                                                  



              Land,                                                  



              [place]                                                  

 

       Swelling -  Swelling Problem Active               2022             

  [facility]



       edema - - edema -                             03:11:55               l



       symptom symptom                                                  [place]



       (finding) (finding)                                                  



              Active                                                  



              Problem                                                  



              2022                                                  



                                                                    



              Medical                                                  



              Group,                                                  



              [facility] [place]                                                  



              Land,                                                  



              [place]                                                  

 

       Kidney  Kidney Problem Active               2017               [place]

milan



       disease disease                             03:07:28               l



       (disorder) (disorder)                                                  He

rmann



              Active                                                  



              Problem                                                  



              2017                                                  



               [place]                                                  



              [facility]                                                    

 

       RIGHT   RIGHT Diagnosis Active               2016               Mem

oria



       WRIST  WRIST                              15:06:00               l



       DISTAL DISTAL                                                  [place]



       RADIUS RADIUS                                                  



       CLSD FX CLSD FX                                                  



              Active                                                  



              [facility]                                                     



              [place]                                                  



              [facility]one &                                                  



              Joint                                                   

 

       DISTAL  DISTAL Diagnosis Active               2016               Me

moria



       RADIUS RADIUS                             09:46:00               l



       CLSD FX CLSD FX                                                  [place]



              Active  [facility]                                                     



              [place]                                                  



              Trace                                                   

 

       Long-term  Long-term Problem Active               2022             

  [facility]



       current current                             03:11:55               l



       use of use of                                                  [place]



       drug   drug                                                    



       therapy therapy                                                  



       (situation (situation                                                  



       )      ) Active                                                  



              Problem                                                  



              2022                                                  



                                                                    



              Medical                                                  



              Group                                                   

 

       Cholestero  Cholester Problem                      2016            

   [facility]



       l      ol                                 05:02:18               l



       (substance (substance                                                  He

rmann



       )      ) Problem                                                  



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



           Pay                                                    

 

           History SDOH 2021 1                     [facility]



           Housing Places 00:00:00   00:00:00                         Hospital



           Lived                                                  

 

           History SDOH 2021 2                     [facility]



           Housing Homeless 00:00:00   00:00:00                         Hospital



           Last Year                                              

 

           Alcohol Comment 2021-04-15 2021-04-15 rarely                [facility]



                      00:00:00   00:00:00                         Hospital

 

           History [facility] 2021 1                     [facility]



           Alcohol Frequency 00:00:00   00:00:00                         Hospita

l

 

           Social History 2020                       [facility]



                      15:59:50   15:59:50                         

 

           Sex Assigned At [address]                       [facility]

[facility]



           [facility]      [facility]   [facility]









                Smoking Status  Start Date      Stop Date       Source

 

                Social [facility]







Medications







       Ordered Filled Start  Stop   Current Ordering Indication Dosage Frequency

 Signature

                    Comments            Components          Source



     Medication Medication Date Date Medication? Clinician                (SIG) 

          



     Name Name                                                   

 

     brimonidine      2022      Yes            1[drp] Q.5D 1 drop 2           

[facility]



     (ALPHAGAN)      2-07                               (two)           [facility]



     0.15 %      13:19:                               times           [facility]



     ophthalmic      20                                 daily.           Center



     solution                                                        

 

     apixaban      2022      Yes            2.5mg Q.5D Take 2.5           [facility]



     (ELIQUIS)      2-07                               mg by           [facility]



     2.5 mg Tab      13:19:                               mouth 2           [facility]

[facility]



     tablet      20                                 (two)           Center



                                                  times           



                                                  daily.           

 

     bumetanide      2022      Yes            2mg  QD   Take 2 mg           [facility]



     (BUMEX) 2      2-07                               by mouth           [facility]



     MG tablet      13:19:                               daily.           [facility]

l



               20                                                Center

 

     pantoprazol      2022      Yes            40mg QD   Take 40 mg           

[facility]



     e         2-07                               by mouth           [facility]



     (PROTONIX)      13:19:                               daily.           [facility]

al



     40 MG      20                                                Center



     tablet                                                        

 

     brimonidine      2022      Yes            1[drp] Q.5D 1 drop 2           

[facility]



     (ALPHAGAN)      2-07                               (two)           [facility]



     0.15 %      13:19:                               times           [facility]



     ophthalmic      20                                 daily.           Center



     solution                                                        

 

     apixaban      2022      Yes            2.5mg Q.5D Take 2.5           [facility]



     (ELIQUIS)      2-07                               mg by           [facility]



     2.5 mg Tab      13:19:                               mouth 2           [facility]

[facility]



     tablet      20                                 (two)           Center



                                                  times           



                                                  daily.           

 

     bumetanide      2022      Yes            2mg  QD   Take 2 mg           [facility]



     (BUMEX) 2      2-07                               by mouth           [facility]



     MG tablet      13:19:                               daily.           [facility]

l



               20                                                Center

 

     pantoprazol      2022      Yes            40mg QD   Take 40 mg           

[facility]



     e         2-07                               by mouth           [facility]



     (PROTONIX)      13:19:                               daily.           [facility]

al



     40 MG      20                                                Center



     tablet                                                        

 

     brimonidine      2022      Yes            1[drp] Q.5D 1 drop 2           

[facility]



     (ALPHAGAN)      2-07                               (two)           [facility]



     0.15 %      13:19:                               times           [facility]



     ophthalmic      20                                 daily.           Center



     solution                                                        

 

     apixaban      2022      Yes            2.5mg Q.5D Take 2.5           [facility]



     (ELIQUIS)      2-07                               mg by           [facility]



     2.5 mg Tab      13:19:                               mouth 2           [facility]

[facility]



     tablet      20                                 (two)           Center



                                                  times           



                                                  daily.           

 

     bumetanide      2022      Yes            2mg  QD   Take 2 mg           [facility]



     (BUMEX) 2      2-07                               by mouth           [facility]



     MG tablet      13:19:                               daily.           [facility]

l



               20                                                Center

 

     pantoprazol      2022      Yes            40mg QD   Take 40 mg           

[facility]



     e         2-07                               by mouth           [facility]



     (PROTONIX)      13:19:                               daily.           [facility]

al



     40 MG      20                                                Center



     tablet                                                        

 

     brimonidine      2022      Yes            1[drp] Q.5D 1 drop 2           

[facility]



     (ALPHAGAN)      2-07                               (two)           [facility]



     0.15 %      13:19:                               times           Medical



     ophthalmic      20                                 daily.           Center



     solution                                                        

 

     apixaban      2022      Yes            2.5mg Q.5D Take 2.5           [facility]



     (ELIQUIS)      2-07                               mg by           [facility]



     2.5 mg Tab      13:19:                               mouth 2           [facility]

[facility]



     tablet      20                                 (two)           Center



                                                  times           



                                                  daily.           

 

     bumetanide      2022      Yes            2mg  QD   Take 2 mg           [facility]



     (BUMEX) 2      2-07                               by mouth           [facility]



     MG tablet      13:19:                               daily.           [facility]

l



               20                                                Center

 

     pantoprazol      2022      Yes            40mg QD   Take 40 mg           

[facility]



     e         2-07                               by mouth           [facility]



     (PROTONIX)      13:19:                               daily.           [facility]

al



     40 MG      20                                                Center



     tablet                                                        

 

     midodrine      2022- Yes            5mg       Take 1           [facility]



     (PROAMATINE      2-07 02-05                          tablet (5           [facility]

[facility]



     ) 5 MG      00:00: 23:59                          mg total)           [facility]

al



     tablet      00   :00                           by mouth 3           Center



                                                  (three)           



                                                  times           



                                                  daily           



                                                  before           



                                                  meals for           



                                                  60 days.           

 

     midodrine      2022- Yes            5mg       Take 1           [facility]



     (PROAMATINE      2-07 02-05                          tablet (5           [facility]

[facility]



     ) 5 MG      00:00: 23:59                          mg total)           Med[facility]

al



     tablet      00   :00                           by mouth 3           Center



                                                  (three)           



                                                  times           



                                                  daily           



                                                  before           



                                                  meals for           



                                                  60 days.           

 

     midodrine      2022- Yes            5mg       Take 1           [facility]



     (PROAMATINE      2-07 02-05                          tablet (5           [facility]

[facility]



     ) 5 MG      00:00: 23:59                          mg total)           [facility]

al



     tablet      00   :00                           by mouth 3           Center



                                                  (three)           



                                                  times           



                                                  daily           



                                                  before           



                                                  meals for           



                                                  60 days.           

 

     midodrine      2022- Yes            5mg       Take 1           [facility]



     (PROAMATINE      2-07 02-05                          tablet (5           [facility]

[facility]



     ) 5 MG      00:00: 23:59                          mg total)           [facility]

al



     tablet      00   :00                           by mouth 3           Center



                                                  (three)           



                                                  times           



                                                  daily           



                                                  before           



                                                  meals for           



                                                  60 days.           

 

     midodrine      2022- No             10mg [id number] Take 10 mg      

     [facility]



     (PROAMATINE      2-06 12-06                     [id number] by mouth 3      

     [facility]



     ) 10 MG      19:03: 00:00                     3D   (three)           [facility]

l



     tablet      43   :00                           times           Center



                                                  daily.           

 

     digoxin      -2022- No             125ug      Take 125           [facility]



     (LANOXIN)      2- 12-06                          mcg by           [facility]



     0.125 MG      19:03: 00:00                          mouth 3           [facility]

al



     tablet      43   :00                           times per           Center



                                                  week .           

 

     metoprolol      -2022- No             25mg Q.5D Take 25 mg           

[facility]



     tartrate      2-                          by mouth 2           [facility]



     (LOPRESSOR)      19:03: 00:00                          (two)           [facility]i

[facility]



     25 MG      43   :00                           times           Center



     tablet                                         daily.           

 

     midodrine      2022- No             10mg [id number] Take 10 mg      

     [facility]



     (PROAMATINE      -                     [id number] by mouth 3      

     [facility]



     ) 10 MG      19:03: 00:00                     3D   (three)           [facility]

l



     tablet      43   :00                           times           Center



                                                  daily.           

 

     digoxin      -2022- No             125ug      Take 125           [facility]



     (LANOXIN)      2- 12-06                          mcg by           [facility]



     0.125 MG      19:03: 00:00                          mouth 3           Medic

al



     tablet      43   :00                           times per           Center



                                                  week .           

 

     metoprolol      2022- No             25mg Q.5D Take 25 mg           

[facility]



     tartrate      2-                          by mouth 2           [facility]

[facility]



     (LOPRESSOR)      19:03: 00:00                          (two)           [facility]

[facility]



     25 MG      43   :00                           times           Center



     tablet                                         daily.           

 

     midodrine      2022- No             10mg [id number] Take 10 mg      

     [facility]



     (PROAMATINE      -                     [id number] by mouth 3      

     [facility]



     ) 10 MG      19:03: 00:00                     3D   (three)           [facility]

l



     tablet      43   :00                           times           Center



                                                  daily.           

 

     digoxin      2022- No             125ug      Take 125           [facility]



     (LANOXIN)      2- 12-06                          mcg by           [facility]



     0.125 MG      19:03: 00:00                          mouth 3           Med[facility]

al



     tablet      43   :00                           times per           Center



                                                  week .           

 

     metoprolol      2022- No             25mg Q.5D Take 25 mg           

[facility]



     tartrate      2--06                          by mouth 2           [facility]

[facility]



     (LOPRESSOR)      19:03: 00:00                          (two)           [facility]

[facility]



     25 MG      43   :00                           times           Center



     tablet                                         daily.           

 

     digoxin      2022- No             125ug      Take 125           [facility]



     (LANOXIN)                                mcg by           [facility]



     0.125 MG      19:03: 00:00                          mouth 3           Medic

al



     tablet      43   :00                           times per           Center



                                                  week .           

 

     metoprolol      2022 No             25mg Q.5D Take 25 mg           

[facility]



     tartrate                                by mouth 2           [facility]

[facility]



     (LOPRESSOR)      19:03: 00:00                          (two)           [facility]

[facility]



     25 MG      43   :00                           times           Center



     tablet                                         daily.           

 

     midodrine      2022 No             10mg [id number] Take 10 mg      

     [facility]



     (PROAMATINE                           [id number] by mouth 3      

     [facility]



     ) 10 MG      19:03: 00:00                     3D   (three)           [facility]

l



     tablet      43   :00                           times           Center



                                                  daily.           

 

     mINOCYCLine      2022      Yes            100mg      Take 1           [facility]



     (MINOCIN,DY      2-06                               capsule           [facility]



     NACIN) 100      00:00:                               (100 mg           [facility]

[facility]



     MG capsule      00                                 total) by           Cent

er



                                                  mouth           



                                                  every 12           



                                                  (twelve)           



                                                  hours.           

 

     mINOCYCLine      2022      Yes            100mg      Take 1           [facility]



     (MINOCIN,DY      2-06                               capsule           [facility]



     NACIN) 100      00:00:                               (100 mg           [facility]

[facility]



     MG capsule      00                                 total) by           Cent

er



                                                  mouth           



                                                  every 12           



                                                  (twelve)           



                                                  hours.           

 

     mINOCYCLine      2022      Yes            100mg      Take 1           [facility]



     (MINOCIN,DY      2-06                               capsule           [facility]



     NACIN) 100      00:00:                               (100 mg           [facility]i

[facility]



     MG capsule      00                                 total) by           Cent

er



                                                  mouth           



                                                  every 12           



                                                  (twelve)           



                                                  hours.           

 

     mINOCYCLine      2022      Yes            100mg      Take 1           [facility]



     (MINOCIN,DY      2-06                               capsule           [facility]



     NACIN) 100      00:00:                               (100 mg           [facility]i

[facility]



     MG capsule      00                                 total) by           Cent

er



                                                  mouth           



                                                  every 12           



                                                  (twelve)           



                                                  hours.           

 

     promethazin      2022 No             12.5mg      Take 1           [facility]

[facility]



     e                                   tablet           [facility]



     (PHENERGAN)      00:00: 23:59                          (12.5 mg           M

edical



     12.5 MG      00   :00                           total) by           Center



     tablet                                         mouth           



                                                  every 6           



                                                  (six)           



                                                  hours as           



                                                  needed for           



                                                  Nausea for           



                                                  up to 15           



                                                  days.           

 

     promethazin      2022- No             12.5mg      Take 1           [facility]

[facility]



     e                                   tablet           [facility]



     (PHENERGAN)      00:00: 23:59                          (12.5 mg           [facility]

[facility]



     12.5 MG      00   :00                           total) by           Center



     tablet                                         mouth           



                                                  every 6           



                                                  (six)           



                                                  hours as           



                                                  needed for           



                                                  Nausea for           



                                                  up to 15           



                                                  days.           

 

     promethazin      2022 No             12.5mg      Take 1           [facility]

[facility]



     e                                   tablet           [facility]



     (PHENERGAN)      00:00: 23:59                          (12.5 mg           [facility]

[facility]



     12.5 MG      00   :00                           total) by           Center



     tablet                                         mouth           



                                                  every 6           



                                                  (six)           



                                                  hours as           



                                                  needed for           



                                                  Nausea for           



                                                  up to 15           



                                                  days.           

 

     promethazin      2022 No             12.5mg      Take 1           [facility]

[facility]



     e                                   tablet           [facility]



     (PHENERGAN)      00:00: 23:59                          (12.5 mg           M

edical



     12.5 MG      00   :00                           total) by           Center



     tablet                                         mouth           



                                                  every 6           



                                                  (six)           



                                                  hours as           



                                                  needed for           



                                                  Nausea for           



                                                  up to 15           



                                                  days.           

 

     atorvastati      2022      Yes            10mg QD   Take 10 mg           

[facility]



     n (LIPITOR)      -18                               by mouth           st



     10 mg      10:44:                               every           [facility]osp[facility]



     tablet      59                                 evening.           l

 

     sucroferric      2022      Yes            500mg Q.[id number] Chew 500      

     [facility]



     oxyhydroxid      -18                          [id number] mg 3           st



     e         10:44:                          3D   (three)           [facility]



     (Velphoro)      59                                 times a           l



     500 mg                                         day with           



     tablet,chew                                         meals.           



     able                                                        

 

     ondansetron      2022      Yes            4mg  Q8H  Take 4 mg           M

[facility]



     (ZOFRAN) 4      -18                               by mouth           st



     MG tablet      10:44:                               every 8           [facility]

ta



               59                                 (eight)           l



                                                  hours as           



                                                  needed for           



                                                  nausea or           



                                                  vomiting.           

 

     brimonidine      2022      Yes            1[drp] Q.5D Administer         

  [facility]



     (ALPHAGAN      1-18                               1 drop to           st



     P) 0.1 %      10:44:                               both eyes           Hosp

[facility]



     drops      59                                 2 (two)           l



                                                  times a           



                                                  day.           

 

     folic acid      2022      Yes            1mg  QD   Take 1           [facility]

[facility]



     (FOLVITE) 1      1-18                               tablet (1           st



     MG tablet      10:44:                               mg total)           [facility]

[facility]



               59                                 by mouth           l



                                                  daily.           

 

     dicyclomine      2022      Yes            20mg Q.[id number] Take 1         

  [facility]



     (BENTYL) 20      1-18                          [id number] tablet (20       

    st



     mg tablet      10:44:                          3W   mg total)           [facility]

[place]



               59                                 by mouth 3           l



                                                  (three)           



                                                  times a           



                                                  week.           

 

     sevelamer      2022      Yes            800mg [id number] Take 1          

 [facility]



     ([facility])                                [id number] tablet           st



     800 mg      10:44:                          3D   (800 mg           [facility]



     tablet      59                                 total) by           l



                                                  mouth 3           



                                                  (three)           



                                                  times a           



                                                  day with           



                                                  meals. 2           



                                                  tabs with           



                                                  meals           

 

     apixaban      2022 No             2.5mg Q.5D Take 1           [facility]

di



     (ELIQUIS)                                tablet           st



     2.5 mg      10:03: 00:00                          (2.5 mg           [facility]



     tablet      30   :00                           total) by           l



                                                  mouth 2           



                                                  (two)           



                                                  times a           



                                                  day.           

 

     apixaban      2022 No             2.5mg Q.5D Take 1           [facility]

di



     (ELIQUIS)                                tablet           st



     2.5 mg      10:03: 00:00                          (2.5 mg           [facility]



     tablet      30   :00                           total) by           l



                                                  mouth 2           



                                                  (two)           



                                                  times a           



                                                  day.           

 

     apixaban      2022      Yes            5mg  Q.5D Take 1           [facility]



     (ELIQUIS) 5      -07                               tablet (5           st



     mg tablet      00:00:                               mg total)           [facility]

[place]



               00                                 by mouth 2           l



                                                  (two)           



                                                  times a           



                                                  day.           

 

     apixaban      2022      Yes            5mg  Q.5D Take 1           [facility]



     (ELIQUIS) 5      -07                               tablet (5           st



     mg tablet      00:00:                               mg total)           [facility]

[place]



               00                                 by mouth 2           l



                                                  (two)           



                                                  times a           



                                                  day.           

 

     atorvastati      2022      Yes            10mg QD   Take 10 mg           

[facility]



     n (LIPITOR)                                     by mouth           st



     10 mg      18:12:                               every           [facility]



     tablet      11                                 evening.           l

 

     sucroferric      2022      Yes            500mg [id number] Chew 500      

     [facility]



     oxyhydroxid                                [id number] mg 3           st



     e         18:12:                          3D   (three)           [facility]



     (Velphoro)      11                                 times a           l



     500 mg                                         day with           



     tablet,chew                                         meals.           



     able                                                        

 

     ondansetron      2022      Yes            4mg  Q8H  Take 4 mg           [facility]

[facility]



     (ZOFRAN) 4      1-06                               by mouth           st



     MG tablet      18:12:                               every 8           [facility]

ta



               11                                 (eight)           l



                                                  hours as           



                                                  needed for           



                                                  nausea or           



                                                  vomiting.           

 

     brimonidine      2022      Yes            1[drp] Q.5D Administer         

  [facility]



     (ALPHAGAN                                     1 drop to           st



     P) 0.1 %      18:12:                               both eyes           [facility]

[facility]



     drops      11                                 2 (two)           l



                                                  times a           



                                                  day.           

 

     folic acid      2022      Yes            1mg  QD   Take 1           [facility]

[facility]



     (FOLVITE) 1      06                               tablet (1           st



     MG tablet      18:12:                               mg total)           [facility]

[place]



               11                                 by mouth           l



                                                  daily.           

 

     pantoprazol      2022 No             40mg QD   Take 40 mg          

 [facility]



     e                                   by mouth           st



     (PROTONIX)      16:58: 00:00                          daily.           [facility]

[facility]



     40 MG EC      50   :00                                          l



     tablet                                                        

 

     pantoprazol      2022 No             40mg QD   Take 40 mg          

 [facility]



     e                                   by mouth           st



     (PROTONIX)      16:58: 00:00                          daily.           [facility]

[facility]



     40 MG EC      50   :00                                          l



     tablet                                                        

 

     pantoprazol      2022      Yes            40mg Q.5D Take 1           [facility]

[facility]



     e         1-06                               tablet (40           st



     (PROTONIX)      00:00:                               mg total)           [facility]

[facility]



     40 MG EC      00                                 by mouth 2           l



     tablet                                         (two)           



                                                  times a           



                                                  day.           

 

     pantoprazol      2022      Yes            40mg Q.5D Take 1           [facility]

[facility]



     e         1-06                               tablet (40           st



     (PROTONIX)      00:00:                               mg total)           [facility]

[facility]



     40 MG EC      00                                 by mouth 2           l



     tablet                                         (two)           



                                                  times a           



                                                  day.           

 

     acetaminoph      2022- No        05894 1{tbl} Q.5D Take 1           

[facility]



     en-codeine                                tablet by           st



     (TYLENOL      00:00: 04:59                          mouth 2           [facility]

ta



     WITH      00   :00                           (two)           l



     CODEINE #3)                                         times a           



     300-30 mg                                         day for 10           



     per tablet                                         days           



                                                  .chronic           



                                                  pain.           

 

     acetaminoph      2022- No        72471 1{tbl} Q.5D Take 1           

[facility]



     en-codeine      20                          tablet by           st



     (TYLENOL      00:00: 04:59                          mouth 2           [facility]

ta



     WITH      00   :00                           (two)           l



     CODEINE #3)                                         times a           



     300-30 mg                                         day for 10           



     per tablet                                         days           



                                                  .chronic           



                                                  pain.           

 

     acetaminoph      -2022- No        96698 1{tbl} Q.5D Take 1           

[facility]



     en-codeine                                tablet by           st



     (TYLENOL      00:00: 00:00                          mouth 2           [facility]

ta



     WITH      00   :00                           (two)           l



     CODEINE #3)                                         times a           



     300-30 mg                                         day for 10           



     per tablet                                         days           



                                                  .acute           



                                                  pain.           

 

     acetaminoph      -2022- No        34116 1{tbl} Q.5D Take 1           

[facility]



     en-codeine                                tablet by           st



     (TYLENOL      00:00: 00:00                          mouth 2           [facility]

ta



     WITH      00   :00                           (two)           l



     CODEINE #3)                                         times a           



     300-30 mg                                         day for 10           



     per tablet                                         days           



                                                  .acute           



                                                  pain.           

 

     methocarbam      -0 - No             500mg Q.25D Take 500          

 [facility]



     oL        -03                          mg by           st



     (ROBAXIN)      18:56: 00:00                          mouth 4           [facility]

[facility]



     500 MG      38   :00                           (four)           l



     tablet                                         times a           



                                                  day.           

 

     methocarbam      -0 - No             500mg Q.25D Take 500          

 [facility]



     oL        -                          mg by           st



     (ROBAXIN)      18:56: 00:00                          mouth 4           [facility]

[facility]



     500 MG      38   :00                           (four)           l



     tablet                                         times a           



                                                  day.           

 

     apixaban      -0 - No             2.5mg Q.5D Take 2.5           [facility]

[facility]



     (ELIQUIS) 5      -03                          mg by           st



     mg tablet      18:51: 00:00                          mouth 2           [facility]

[facility]



               03   :00                           (two)           l



                                                  times a           



                                                  day.           

 

     apixaban      -0 - No             2.5mg Q.5D Take 2.5           [facility]

[facility]



     (ELIQUIS) 5      --03                          mg by           st



     mg tablet      18:51: 00:00                          mouth 2           [facility]

[facility]



               03   :00                           (two)           l



                                                  times a           



                                                  day.           

 

     sevelamer      -0 - No             1600mg Q.[id number] Take 1,600    

       [facility]



     (RENVELA)      -                     [id number] mg by           st



     800 mg      18:50: 00:00                     3D   mouth 3           [facility]



     tablet      58   :00                           (three)           l



                                                  times a           



                                                  day with           



                                                  meals.           

 

     sevelamer      -0 2022- No             1600mg Q.[id number] Take 1,600    

       [facility]



     (RENVELA)                           [id number] mg by           st



     800 mg      18:50: 00:00                     3D   mouth 3           [facility]



     tablet      58   :00                           (three)           l



                                                  times a           



                                                  day with           



                                                  meals.           

 

     oxyCODONE      2022-0 2022- No        45461 5mg  Q4H  Take 5 mg           [facility]

[facility]



     (ROXICODONE                                by mouth           st



     ) 5 MG      18:50: 00:00                          every 4           [facility]



     immediate      30   :00                           (four)           l



     release                                         hours as           



     tablet                                         needed for           



                                                  moderate           



                                                  pain           



                                                  .acute           



                                                  pain.           

 

     oxyCODONE      2022-0 2022- No        34809 5mg  Q4H  Take 5 mg           [facility]

[facility]



     (ROXICODONE                                by mouth           st



     ) 5 MG      18:50: 00:00                          every 4           [facility]



     immediate      30   :00                           (four)           l



     release                                         hours as           



     tablet                                         needed for           



                                                  moderate           



                                                  pain           



                                                  .acute           



                                                  pain.           

 

     tiZANidine      2022-0      Yes            4mg  Q.5D Take 1           [facility]

di



     (ZANAFLEX)      9-01                               tablet (4           st



     4 MG tablet      00:00:                               mg total)           [facility]

[facility]



               00                                 by mouth 2           l



                                                  (two)           



                                                  times a           



                                                  day as           



                                                  needed for           



                                                  muscle           



                                                  spasms.           

 

     tiZANidine      2022-0      Yes            4mg  Q.5D Take 1           [facility]

di



     (ZANAFLEX)      9-01                               tablet (4           st



     4 MG tablet      00:00:                               mg total)           [facility]

[facility]



               00                                 by mouth 2           l



                                                  (two)           



                                                  times a           



                                                  day as           



                                                  needed for           



                                                  muscle           



                                                  spasms.           

 

     promethazin      2022-0      Yes            25mg Q6H  Take 1           [facility]

[facility]



     e         8-01                               tablet (25           st



     (PHENERGAN)      00:00:                               mg total)           [facility]

[facility]



     25 MG      00                                 by mouth           l



     tablet                                         every 6           



                                                  (six)           



                                                  hours as           



                                                  needed for           



                                                  vomiting           



                                                  or nausea.           

 

     promethazin      2022-0      Yes            25mg Q6H  Take 1           [facility]

[facility]



     e         8-01                               tablet (25           st



     (PHENERGAN)      00:00:                               mg total)           [facility]

[facility]



     25 MG      00                                 by mouth           l



     tablet                                         every 6           



                                                  (six)           



                                                  hours as           



                                                  needed for           



                                                  vomiting           



                                                  or nausea.           

 

     promethazin      2022-0      Yes            50mg Q.[id number] Take 1         

  [facility]



     e         7-28                          [id number] tablet (50           st



     (PHENERGAN)      00:00:                          3D   mg total)           [facility]

[facility]



     50 MG      00                                 by mouth 3           l



     tablet                                         (three)           



                                                  times a           



                                                  day as           



                                                  needed for           



                                                  nausea or           



                                                  vomiting.           

 

     promethazin            Yes            50mg Q.[id number] Take 1         

  [facility]



     e                                   [id number] tablet (50           st



     (PHENERGAN)      00:00:                          3D   mg total)           [facility]

[facility]



     50 MG      00                                 by mouth 3           l



     tablet                                         (three)           



                                                  times a           



                                                  day as           



                                                  needed for           



                                                  nausea or           



                                                  vomiting.           

 

     diclofenac      0      Yes            75mg Q.5D Take 1           [facility]

di



     (VOLTAREN)      7-26                               tablet (75           st



     75 MG EC      00:00:                               mg total)           [facility]

[facility]



     tablet      00                                 by mouth 2           l



                                                  (two)           



                                                  times a           



                                                  day.           

 

     diclofenac      0      Yes            75mg Q.5D Take 1           [facility]

di



     (VOLTAREN)      7-26                               tablet (75           st



     75 MG EC      00:00:                               mg total)           [facility]

[facility]



     tablet      00                                 by mouth 2           l



                                                  (two)           



                                                  times a           



                                                  day.           

 

     fluconazole            Yes            150mg Q7D  Take 1           [facility]

[facility]



     (DIFLUCAN)      7-25                               tablet           st



     150 MG      00:00:                               (150 mg           [facility]



     tablet      00                                 total) by           l



                                                  mouth once           



                                                  a week. On           



                                                  Monday           

 

     fluconazole            Yes            150mg Q7D  Take 1           [facility]

[facility]



     (DIFLUCAN)      7-25                               tablet           st



     150 MG      00:00:                               (150 mg           [facility]



     tablet      00                                 total) by           l



                                                  mouth once           



                                                  a week. On           



                                                  Monday           

 

     DULoxetine      2022- No             60mg QD   Take 60 mg           

[facility]



     (CYMBALTA)                                by mouth           st



     60 MG      18:01: 00:00                          daily.           [facility]



     capsule      54   :00                                          l

 

     gabapentin      2022- No             300mg Q.5D Take 300           [facility]

[facility]



     (NEURONTIN)      -                          mg by           st



     100 mg      18:01: 00:00                          mouth 2           [facility]



     capsule      54   :00                           (two)           l



                                                  times a           



                                                  day.           

 

     allopurinoL      2022- No             100mg QD   Take 100           

[facility]



     (ZYLOPRIM)      -                          mg by           st



     100 MG      18:01: 00:00                          mouth           [facility]



     tablet      54   :00                           daily.           l

 

     DULoxetine      2022- No             60mg QD   Take 60 mg           

[facility]



     (CYMBALTA)                                by mouth           st



     60 MG      18:01: 00:00                          daily.           [facility]



     capsule      54   :00                                          l

 

     gabapentin      2022- No             300mg Q.5D Take 300           [facility]

[facility]



     (NEURONTIN)      -09                          mg by           st



     100 mg      18:01: 00:00                          mouth 2           [facility]



     capsule      54   :00                           (two)           l



                                                  times a           



                                                  day.           

 

     allopurinoL      2022- No             100mg QD   Take 100           

[facility]



     (ZYLOPRIM)      -                          mg by           st



     100 MG      18:01: 00:00                          mouth           [facility]



     tablet      54   :00                           daily.           l

 

     Zithromax            Yes                      See            [employer] 250      6-29                               Instructio           l



     mg oral      21:14:                               ns, Take 2           [facility]

[facility]



     tablet      00                                 tablets by           



                                                  mouth the           



                                                  first day           



                                                  then 1           



                                                  tablet by           



                                                  mouth days           



                                                  2-5. (Pt           



                                                  is on           



                                                  hemodialys           



                                                  is)., X 5           



                                                  day, # 6           



                                                  tab, 0           



                                                  Refill(s),           



                                                  Pharmacy:           



                                                  [facility],           



                                                  170.18,           



                                                  cm,            



                                                  22           



                                                  14:52:00           



                                                  CDT,           



                                                  Height,           



                                                  106.818,           



                                                  kg,            



                                                  22           



                                                  14...           

 

     Zithromax      -0      Yes                      See            [employer] 250      6-29                               Instructio           l



     mg oral      21:14:                               ns, Take 2           [facility]

daryl



     tablet      00                                 tablets by           



                                                  mouth the           



                                                  first day           



                                                  then 1           



                                                  tablet by           



                                                  mouth days           



                                                  2-5. (Pt           



                                                  is on           



                                                  hemodialys           



                                                  is)., X 5           



                                                  day, # 6           



                                                  tab, 0           



                                                  Refill(s),           



                                                  Pharmacy:           



                                                  [facility],           



                                                  170.18,           



                                                  cm,            



                                                  22           



                                                  14:52:00           



                                                  CDT,           



                                                  Height,           



                                                  106.818,           



                                                  kg,            



                                                  22           



                                                  14...           

 

     Zithromax      0      Yes                      See            [employer] 250      6-29                               Instructio           l



     mg oral      21:14:                               ns, Take 2           [facility]

daryl



     tablet      00                                 tablets by           



                                                  mouth the           



                                                  first day           



                                                  then 1           



                                                  tablet by           



                                                  mouth days           



                                                  2-5. (Pt           



                                                  is on           



                                                  hemodialys           



                                                  is)., X 5           



                                                  day, # 6           



                                                  tab, 0           



                                                  Refill(s),           



                                                  Pharmacy:           



                                                  [facility],           



                                                  170.18,           



                                                  cm,            



                                                  22           



                                                  14:52:00           



                                                  CDT,           



                                                  Height,           



                                                  106.818,           



                                                  kg,            



                                                  22           



                                                  14...           

 

     Zithromax      2022-0      Yes                      See            [facility]



     [employer] 250      6-29                               Instructio           l



     mg oral      21:14:                               ns, Take 2           [facility]

daryl



     tablet      00                                 tablets by           



                                                  mouth the           



                                                  first day           



                                                  then 1           



                                                  tablet by           



                                                  mouth days           



                                                  2-5. (Pt           



                                                  is on           



                                                  hemodialys           



                                                  is)., X 5           



                                                  day, # 6           



                                                  tab, 0           



                                                  Refill(s),           



                                                  Pharmacy:           



                                                  [facility],           



                                                  170.18,           



                                                  cm,            



                                                  22           



                                                  14:52:00           



                                                  CDT,           



                                                  Height,           



                                                  106.818,           



                                                  kg,            



                                                  22           



                                                  14...           

 

     Zithromax      2022-0      Yes                      See            [facility]



     [employer] 250      6-29                               Instructio           l



     mg oral      21:14:                               ns, Take 2           [facility]

daryl



     tablet      00                                 tablets by           



                                                  mouth the           



                                                  first day           



                                                  then 1           



                                                  tablet by           



                                                  mouth days           



                                                  2-5. (Pt           



                                                  is on           



                                                  hemodialys           



                                                  is)., X 5           



                                                  day, # 6           



                                                  tab, 0           



                                                  Refill(s),           



                                                  Pharmacy:           



                                                  [facility],           



                                                  170.18,           



                                                  cm,            



                                                  22           



                                                  14:52:00           



                                                  CDT,           



                                                  Height,           



                                                  106.818,           



                                                  kg,            



                                                  22           



                                                  14...           

 

     Zithromax      2022-0      Yes                      See            [facility]      6-29                               Instructio           l



     mg oral      21:14:                               ns, Take 2           [facility]

daryl



     tablet      00                                 tablets by           



                                                  mouth the           



                                                  first day           



                                                  then 1           



                                                  tablet by           



                                                  mouth days           



                                                  2-5. (Pt           



                                                  is on           



                                                  hemodialys           



                                                  is)., X 5           



                                                  day, # 6           



                                                  tab, 0           



                                                  Refill(s),           



                                                  Pharmacy:           



                                                  [facility],           



                                                  170.18,           



                                                  cm,            



                                                  22           



                                                  14:52:00           



                                                  CDT,           



                                                  Height,           



                                                  106.818,           



                                                  kg,            



                                                  22           



                                                  14...           

 

     Zithromax      2022-0      Yes                      See            [facility]



     [employer] [facility]      6-29                               Instructio           l



     mg oral      21:14:                               ns, Take 2           [facility]

daryl



     tablet      00                                 tablets by           



                                                  mouth the           



                                                  first day           



                                                  then 1           



                                                  tablet by           



                                                  mouth days           



                                                  2-5. (Pt           



                                                  is on           



                                                  hemodialys           



                                                  is)., X 5           



                                                  day, # 6           



                                                  tab, 0           



                                                  Refill(s),           



                                                  Pharmacy:           



                                                  [facility],           



                                                  170.18,           



                                                  cm,            



                                                  22           



                                                  14:52:00           



                                                  CDT,           



                                                  Height,           



                                                  106.818,           



                                                  kg,            



                                                  22           



                                                  14...           

 

     Zithromax      2022-0      Yes                      See            [facility]



     [employer] 250      6-29                               Instructio           l



     mg oral      21:14:                               ns, Take 2           [facility]

daryl



     tablet      00                                 tablets by           



                                                  mouth the           



                                                  first day           



                                                  then 1           



                                                  tablet by           



                                                  mouth days           



                                                  2-5. (Pt           



                                                  is on           



                                                  hemodialys           



                                                  is)., X 5           



                                                  day, # 6           



                                                  tab, 0           



                                                  Refill(s),           



                                                  Pharmacy:           



                                                  [facility]            



                                                  Pharmacy           



                                                  [place],           



                                                  170.18,           



                                                  cm,            



                                                  22           



                                                  14:52:00           



                                                  CDT,           



                                                  Height,           



                                                  106.818,           



                                                  kg,            



                                                  22           



                                                  14...           

 

     Velphoro      2022-0      Yes                      500 mg,           [facility]

a



               6-29                               CHEW,           l



               20:13:                               Daily,           [facility]



               00                                 take with           



                                                  food, 0           



                                                  Refill(s)           

 

     Velphoro      2022-0      Yes                      500 mg,           [facility]

a



               6-29                               CHEW,           l



               20:13:                               Daily,           [facility]



               00                                 take with           



                                                  food, 0           



                                                  Refill(s)           

 

     Velphoro      2022-0      Yes                      500 mg,           [facility]

a



               6-29                               CHEW,           l



               20:13:                               Daily,           [facility]



               00                                 take with           



                                                  food, 0           



                                                  Refill(s)           

 

     Velphoro      2022-0      Yes                      500 mg,           [facility]

a



               6-29                               CHEW,           l



               20:13:                               Daily,           [facility]



               00                                 take with           



                                                  food, 0           



                                                  Refill(s)           

 

     Velphoro      2022-0      Yes                      500 mg,           [facility]

a



               6-29                               CHEW,           l



               20:13:                               Daily,           [facility]



               00                                 take with           



                                                  food, 0           



                                                  Refill(s)           

 

     Velphoro      2022-0      Yes                      500 mg,           [facility]

a



               6-29                               CHEW,           l



               20:13:                               Daily,           [facility]



               00                                 take with           



                                                  food, 0           



                                                  Refill(s)           

 

     Velphoro      2022-0      Yes                      500 mg,           [facility]

a



               6-29                               CHEW,           l



               20:13:                               Daily,           [facility]



               00                                 take with           



                                                  food, 0           



                                                  Refill(s)           

 

     Velphoro      2022-0      Yes                      500 mg,           [facility]

a



               6-29                               CHEW,           l



               20:13:                               Daily,           [facility]



               00                                 take with           



                                                  food, 0           



                                                  Refill(s)           

 

     sevelamer      2022-0      Yes                      2,400 mg =           [facility]

[facility]



     carbonate      6-29                               3 tab, PO,           l



     800 mg oral      20:12:                               TID-Meals,           

[facility]



     tablet      00                                 # 270 tab,           



                                                  0              



                                                  Refill(s)           

 

     sevelamer      2022-0      Yes                      2,400 mg =           [facility]

[facility]



     carbonate      6-29                               3 tab, PO,           l



     800 mg oral      20:12:                               TID-Meals,           

[facility]



     tablet      00                                 # 270 tab,           



                                                  0              



                                                  Refill(s)           

 

     sevelamer      -0      Yes                      2,400 mg =           Me

[facility]



     carbonate      6-29                               3 tab, PO,           l



     800 mg oral      20:12:                               [facility]als,           

[facility]



     tablet      00                                 # 270 tab,           



                                                  0              



                                                  Refill(s)           

 

     sevelamer      -0      Yes                      2,400 mg =           [facility]

[facility]



     carbonate      6-29                               3 tab, PO,           l



     800 mg oral      20:12:                               [facility]ID[facility]als,           

[facility]



     tablet      00                                 # 270 tab,           



                                                  0              



                                                  Refill(s)           

 

     sevelamer      -0      Yes                      2,400 mg =           Me

[facility]



     carbonate      6-29                               3 tab, PO,           l



     800 mg oral      20:12:                               [facility]ID[facility]als,           

[facility]



     tablet      00                                 # 270 tab,           



                                                  0              



                                                  Refill(s)           

 

     sevelamer      -0      Yes                      2,400 mg =           [facility]

[facility]



     carbonate      6-29                               3 tab, PO,           l



     800 mg oral      20:12:                               TID-Meals,           

[facility]



     tablet      00                                 # 270 tab,           



                                                  0              



                                                  Refill(s)           

 

     sevelamer      -0      Yes                      2,400 mg =           [facility]

[facility]



     carbonate      6-29                               3 tab, PO,           l



     800 mg oral      20:12:                               TID-Meals,           

[facility]



     tablet      00                                 # 270 tab,           



                                                  0              



                                                  Refill(s)           

 

     sevelamer      -0      Yes                      2,400 mg =           Me

[facility]



     carbonate      6-29                               3 tab, PO,           l



     800 mg oral      20:12:                               TID[facility]Meals,           

[facility]



     tablet      00                                 # 270 tab,           



                                                  0              



                                                  Refill(s)           

 

     pantoprazol      -0      Yes                      40 mg = 1           [facility]

[facility]



     e 40 mg      6-29                               tab, PO,           l



     oral      20:11:                               Daily, 0           [facility]



     enteric      00                                 Refill(s)           



     coated                                                        



     tablet                                                        

 

     pantoprazol      -0      Yes                      40 mg = 1           [facility]

[facility]



     e 40 mg      6-29                               tab, PO,           l



     oral      20:11:                               Daily, 0           [facility]



     [facility]eric      00                                 Refill(s)           



     coated                                                        



     tablet                                                        

 

     pantoprazol      -0      Yes                      40 mg = 1           [facility]

[facility]



     e 40 mg      6-29                               tab, PO,           l



     oral      20:11:                               Daily, 0           [facility]



     enteric      00                                 Refill(s)           



     coated                                                        



     tablet                                                        

 

     pantoprazol      -0      Yes                      40 mg = 1           [facility]

[facility]



     e 40 mg      6-29                               tab, PO,           l



     oral      20:11:                               Daily, 0           [facility]



     enteric      00                                 Refill(s)           



     coated                                                        



     tablet                                                        

 

     pantoprazol      -0      Yes                      40 mg = 1           [facility]

[facility]



     e 40 mg      6-29                               tab, PO,           l



     oral      20:11:                               Daily, 0           [facility]



     enteric      00                                 Refill(s)           



     coated                                                        



     tablet                                                        

 

     pantoprazol      -0      Yes                      40 mg = 1           [facility]

[facility]



     e 40 mg      6-29                               tab, PO,           l



     oral      20:11:                               Daily, 0           [facility]



     enteric      00                                 Refill(s)           



     coated                                                        



     tablet                                                        

 

     pantoprazol      -0      Yes                      40 mg = 1           [facility]

[facility]



     e 40 mg      6-29                               tab, PO,           l



     oral      20:11:                               Daily, 0           [facility]



     enteric      00                                 Refill(s)           



     coated                                                        



     tablet                                                        

 

     pantoprazol      -0      Yes                      40 mg = 1           [facility]

[facility]



     e 40 mg      6-29                               tab, PO,           l



     oral      20:11:                               Daily, 0           [facility]



     enteric      00                                 Refill(s)           



     coated                                                        



     tablet                                                        

 

     oxyCODONE 5      -0      Yes                      5 mg = 1           [facility]

[facility]



     mg oral      6-29                               tab, PO,           l



     tablet,      20:10:                               Q4H, PRN           [facility]

n



     immediate      00                                 Pain, prn,           



     release                                         0              



                                                  Refill(s)           

 

     oxyCODONE 5      -0      Yes                      5 mg = 1           [facility]

[facility]



     mg oral      6-29                               tab, PO,           l



     tablet,      20:10:                               Q4H, PRN           [facility]

n



     immediate      00                                 Pain, prn,           



     release                                         0              



                                                  Refill(s)           

 

     oxyCODONE 5      -0      Yes                      5 mg = 1           [facility]

[facility]



     mg oral      6-29                               tab, PO,           l



     tablet,      20:10:                               Q4H, PRN           [facility]

n



     immediate      00                                 Pain, prn,           



     release                                         0              



                                                  Refill(s)           

 

     oxyCODONE 5      -0      Yes                      5 mg = 1           [facility]

[facility]



     mg oral      6-29                               tab, PO,           l



     tablet,      20:10:                               Q4H, PRN           [facility]

n



     immediate      00                                 Pain, prn,           



     release                                         0              



                                                  Refill(s)           

 

     oxyCODONE 5      -0      Yes                      5 mg = 1           [facility]

[facility]



     mg oral      6-29                               tab, PO,           l



     tablet,      20:10:                               Q4H, PRN           [facility]

n



     immediate      00                                 Pain, prn,           



     release                                         0              



                                                  Refill(s)           

 

     oxyCODONE 5      -0      Yes                      5 mg = 1           [facility]

[facility]



     mg oral      6-29                               tab, PO,           l



     tablet,      20:10:                               Q4H, PRN           [facility]

n



     immediate      00                                 Pain, prn,           



     release                                         0              



                                                  Refill(s)           

 

     oxyCODONE 5      -0      Yes                      5 mg = 1           [facility]

[facility]



     mg oral      6-29                               tab, PO,           l



     tablet,      20:10:                               Q4H, PRN           [facility]

n



     immediate      00                                 Pain, prn,           



     release                                         0              



                                                  Refill(s)           

 

     oxyCODONE 5      -0      Yes                      5 mg = 1           [facility]

[facility]



     mg oral      6-29                               tab, PO,           l



     tablet,      20:10:                               Q4H, PRN           [facility]

n



     immediate      00                                 Pain, prn,           



     release                                         0              



                                                  Refill(s)           

 

     ondansetron      2-0      Yes                      4 mg = 1           [facility]

[facility]



     4 mg oral      6-29                               tab, PO,           l



     tablet,      20:09:                               TID, PRN           [facility]

n



     disintegrat      00                                 Nausea /           



     ing                                          Vomiting,           



                                                  Dissolve           



                                                  tab under           



                                                  tongue, 0           



                                                  Refill(s)           

 

     ondansetron      2-0      Yes                      4 mg = 1           [facility]

[facility]



     4 mg oral      6-29                               tab, PO,           l



     tablet,      20:09:                               TID, PRN           [facility]

n



     disintegrat      00                                 Nausea /           



     ing                                          Vomiting,           



                                                  Dissolve           



                                                  tab under           



                                                  tongue, 0           



                                                  Refill(s)           

 

     ondansetron      2-0      Yes                      4 mg = 1           [facility]

[facility]



     4 mg oral      6-29                               tab, PO,           l



     tablet,      20:09:                               TID, PRN           [facility]

n



     disintegrat      00                                 Nausea /           



     ing                                          Vomiting,           



                                                  Dissolve           



                                                  tab under           



                                                  tongue, 0           



                                                  Refill(s)           

 

     ondansetron      2022-0      Yes                      4 mg = 1           [facility]

[facility]



     4 mg oral      6-29                               tab, PO,           l



     tablet,      20:09:                               TID, PRN           [facility]

n



     disintegrat      00                                 Nausea /           



     ing                                          Vomiting,           



                                                  Dissolve           



                                                  tab under           



                                                  tongue, 0           



                                                  Refill(s)           

 

     ondansetron      2022-0      Yes                      4 mg = 1           [facility]

[facility]



     4 mg oral      6-29                               tab, PO,           l



     tablet,      20:09:                               TID, PRN           [facility]

n



     disintegrat      00                                 Nausea /           



     ing                                          Vomiting,           



                                                  Dissolve           



                                                  tab under           



                                                  tongue, 0           



                                                  Refill(s)           

 

     ondansetron      2022-0      Yes                      4 mg = 1           [facility]

[facility]



     4 mg oral      6-29                               tab, PO,           l



     tablet,      20:09:                               TID, PRN           [facility]

n



     disintegrat      00                                 Nausea /           



     ing                                          Vomiting,           



                                                  Dissolve           



                                                  tab under           



                                                  tongue, 0           



                                                  Refill(s)           

 

     ondansetron      -0      Yes                      4 mg = 1           [facility]

[facility]



     4 mg oral      6-29                               tab, PO,           l



     tablet,      20:09:                               TID, PRN           [facility]

n



     disintegrat      00                                 Nausea /           



     ing                                          Vomiting,           



                                                  Dissolve           



                                                  tab under           



                                                  tongue, 0           



                                                  Refill(s)           

 

     ondansetron      -0      Yes                      4 mg = 1           [facility]

[facility]



     4 mg oral      6-29                               tab, PO,           l



     tablet,      20:09:                               TID, PRN           [facility]

n



     disintegrat      00                                 Nausea /           



     ing                                          Vomiting,           



                                                  Dissolve           



                                                  tab under           



                                                  tongue, 0           



                                                  Refill(s)           

 

     Nitazoxanid      -0      Yes                      500 mg = 1           

[facility]



     e 500 mg      6-29                               tab, PO,           l



     oral tablet      20:08:                               Q12H, 0           [facility]

[facility]



               00                                 Refill(s)           

 

     Nitazoxanid      -0      Yes                      500 mg = 1           

[facility]



     e 500 mg      6-29                               tab, PO,           l



     oral tablet      20:08:                               Q12H, 0           [facility]

[facility]



               00                                 Refill(s)           

 

     Nitazoxanid      -0      Yes                      500 mg = 1           

[facility]



     e 500 mg      6-29                               tab, PO,           l



     oral tablet      20:08:                               Q12H, 0           [facility]

[facility]



               00                                 Refill(s)           

 

     Nitazoxanid      -0      Yes                      500 mg = 1           

[facility]



     e 500 mg      6-29                               tab, PO,           l



     oral tablet      20:08:                               Q12H, 0           [facility]

[facility]



               00                                 Refill(s)           

 

     Nitazoxanid      -0      Yes                      500 mg = 1           

[facility]



     e 500 mg      6-29                               tab, PO,           l



     oral tablet      20:08:                               Q12H, 0           [facility]

[facility]



               00                                 Refill(s)           

 

     Nitazoxanid      -0      Yes                      500 mg = 1           

[facility]



     e 500 mg      6-29                               tab, PO,           l



     oral tablet      20:08:                               Q12H, 0           [facility]

[facility]



               00                                 Refill(s)           

 

     Nitazoxanid      -0      Yes                      500 mg = 1           

[facility]



     e 500 mg      6-29                               tab, PO,           l



     oral tablet      20:08:                               Q12H, 0           [facility]

[facility]



               00                                 Refill(s)           

 

     Nitazoxanid      -0      Yes                      500 mg = 1           

[facility]



     e 500 mg      6-29                               tab, PO,           l



     oral tablet      20:08:                               Q12H, 0           [facility]

[facility]



               00                                 Refill(s)           

 

     metoprolol      2-0      Yes                      25 mg = 1           [facility]

[facility]



     tartrate 25      6-29                               tab, PO,           l



     mg oral      20:07:                               BID, 0           [facility]



     tablet      00                                 Refill(s)           

 

     metoprolol      2-0      Yes                      25 mg = 1           [facility]

[facility]



     tartrate 25      6-29                               tab, PO,           l



     mg oral      20:07:                               BID, 0           [facility]



     tablet      00                                 Refill(s)           

 

     metoprolol      2-0      Yes                      25 mg = 1           [facility]

[facility]



     tartrate 25      6-29                               tab, PO,           l



     mg oral      20:07:                               BID, 0           [facility]



     tablet      00                                 Refill(s)           

 

     metoprolol      2-0      Yes                      25 mg = 1           [facility]

[facility]



     tartrate 25      6-29                               tab, PO,           l



     mg oral      20:07:                               BID, 0           [facility]



     tablet      00                                 Refill(s)           

 

     metoprolol      2-0      Yes                      25 mg = 1           [facility]

[facility]



     tartrate 25      6-29                               tab, PO,           l



     mg oral      20:07:                               BID, 0           [facility]



     tablet      00                                 Refill(s)           

 

     metoprolol      2-0      Yes                      25 mg = 1           [facility]

[facility]



     tartrate 25      6-29                               tab, PO,           l



     mg oral      20:07:                               BID, 0           [facility]



     tablet      00                                 Refill(s)           

 

     metoprolol      2-0      Yes                      25 mg = 1           [facility]

[facility]



     tartrate 25      6-29                               tab, PO,           l



     mg oral      20:07:                               BID, 0           [facility]



     tablet      00                                 Refill(s)           

 

     metoprolol      2-0      Yes                      25 mg = 1           [facility]

[facility]



     tartrate 25      6-29                               tab, PO,           l



     mg oral      20:07:                               BID, 0           [facility]



     tablet      00                                 Refill(s)           

 

     methocarbam      2022-0      Yes                      250 mg =           [facility]

[facility]



     ol 500 mg      6-29                               0.5 tab,           l



     oral tablet      20:05:                               PO, TID, 0           

[facility]



               00                                 Refill(s)           

 

     methocarbam      2022-0      Yes                      250 mg =           [facility]

[facility]



     ol 500 mg      6-29                               0.5 tab,           l



     oral tablet      20:05:                               PO, TID, 0           

[facility]



               00                                 Refill(s)           

 

     methocarbam      2022-0      Yes                      250 mg =           [facility]

[facility]



     ol 500 mg      6-29                               0.5 tab,           l



     oral tablet      20:05:                               PO, TID, 0           

[facility]



               00                                 Refill(s)           

 

     methocarbam      2022-0      Yes                      250 mg =           [facility]

[facility]



     ol 500 mg      6-29                               0.5 tab,           l



     oral tablet      20:05:                               PO, TID, 0           

[facility]                                 Refill(s)           

 

     methocarbam      2-0      Yes                      250 mg =           [facility]

[facility]



     ol 500 mg      6-29                               0.5 tab,           l



     oral tablet      20:05:                               PO, TID, 0           

                                 Refill(s)           

 

     methocarbam      2-0      Yes                      250 mg =           [facility]

[facility]



     ol 500 mg      6-29                               0.5 tab,           l



     oral tablet      20:05:                               PO, TID, 0           

[facility]                                 Refill(s)           

 

     methocarbam      2-0      Yes                      250 mg =           [facility]

[facility]



     ol 500 mg      6-29                               0.5 tab,           l



     oral tablet      20:05:                               PO, TID, 0           

[facility]                                 Refill(s)           

 

     methocarbam      -0      Yes                      250 mg =           [facility]

[facility]



     ol 500 mg      6-29                               0.5 tab,           l



     oral tablet      20:05:                               PO, TID, 0           

                                 Refill(s)           

 

     Alphagan P      -0      Yes                      1 drp,           [facility]

[facility]



     0.1%      6-29                               OPTH, Q12H           l



     ophthalmic      20:04:                                              [facility]



     solution                                                      

 

     folic acid      -0      Yes                      1 mg, PO,           [facility]

[facility]



               6-29                               Daily, 0           l



               20:04:                               Refill(s)           [facility]



                                                               

 

     Alphagan P      2-0      Yes                      1 drp,           [facility]

[facility]



     0.1%      6-29                               OPTH, Q12H           l



     ophthalmic      20:04:                                              [facility]



     solution                                                      

 

     folic acid      -0      Yes                      1 mg, PO,           [facility]

[facility]



               6-29                               Daily, 0           l



               20:04:                               Refill(s)           [facility]



                                                               

 

     Alphagan P      2-0      Yes                      1 drp,           [facility]

[facility]



     0.1%      6-29                               OPTH, Q12H           l



     ophthalmic      20:04:                                              [facility]



     solution      00                                                

 

     folic acid      -0      Yes                      1 mg, PO,           [facility]

[facility]



               6-29                               Daily, 0           l



               20:04:                               Refill(s)           [facility]



                                                               

 

     Alphagan P      2-0      Yes                      1 drp,           [facility]

[facility]



     0.1%      6-29                               OPTH, Q12H           l



     ophthalmic      20:04:                                              [facility]



     solution                                                      

 

     folic acid      -0      Yes                      1 mg, PO,           [facility]

[facility]



               6-29                               Daily, 0           l



               20:04:                               Refill(s)           [facility]



               00                                                

 

     Alphagan P      -0      Yes                      1 drp,           [facility]

[facility]



     0.1%      6-29                               OPTH, Q12H           l



     ophthalmic      20:04:                                              [facility]



     solution      00                                                

 

     folic acid      -0      Yes                      1 mg, PO,           [facility]

[facility]



               6-29                               Daily, 0           l



               20:04:                               Refill(s)           [facility]



               00                                                

 

     Alphagan P      -0      Yes                      1 drp,           [facility]

[facility]



     0.1%      -29                               OPTH, Q12H           l



     ophthalmic      20:04:                                              [facility]



     solution      00                                                

 

     folic acid      -0      Yes                      1 mg, PO,           [facility]

[facility]



               6-                               Daily, 0           l



               20:04:                               Refill(s)           [facility]



               00                                                

 

     Alphagan P      -0      Yes                      1 drp,           [facility]

[facility]



     0.1%      -29                               OPTH, Q12H           l



     ophthalmic      20:04:                                              [facility]



     solution      00                                                

 

     folic acid      -0      Yes                      1 mg, PO,           [facility]

[facility]



               -                               Daily, 0           l



               20:04:                               Refill(s)           [facility]



               00                                                

 

     Alphagan P      -0      Yes                      1 drp,           [facility]

ia



     0.1%      -                               OPTH, Q12H           l



     ophthalmic      20:04:                                              [facility]



     solution      00                                                

 

     folic acid      -0      Yes                      1 mg, PO,           [facility]

[facility]



               -                               Daily, 0           l



               20:04:                               Refill(s)           [facility]



               00                                                

 

     Oxygen      -0      Yes                      1 btl,           [facility]



               6-29                               MISC,           l



               20:03:                               Daily, 2           [facility]



               00                                 liters, 0           



                                                  Refill(s)           

 

     Oxygen      -0      Yes                      1 btl,           [facility]



               6-29                               MISC,           l



               20:03:                               Daily, 2           [facility]



               00                                 liters, 0           



                                                  Refill(s)           

 

     Oxygen      -0      Yes                      1 btl,           [facility]



               6-29                               MISC,           l



               20:03:                               Daily, 2           [facility]



               00                                 liters, 0           



                                                  Refill(s)           

 

     Oxygen      -0      Yes                      1 btl,           [facility]



               6-29                               MISC,           l



               20:03:                               Daily, 2           [facility]



               00                                 liters, 0           



                                                  Refill(s)           

 

     Oxygen      -0      Yes                      1 btl,           [facility]



               6-29                               MISC,           l



               20:03:                               Daily, 2           [facility]



               00                                 liters, 0           



                                                  Refill(s)           

 

     Oxygen      -0      Yes                      1 btl,           [facility]



               6-29                               MISC,           l



               20:03:                               Daily, 2           [facility]



               00                                 liters, 0           



                                                  Refill(s)           

 

     Oxygen      0      Yes                      1 btl,           [facility]



               6-29                               MISC,           l



               20:03:                               Daily, 2           [facility]



               00                                 liters, 0           



                                                  Refill(s)           

 

     Oxygen      -0      Yes                      1 btl,           [facility]



               6-29                               MISC,           l



               20:03:                               Daily, 2           [facility]



               00                                 liters, 0           



                                                  Refill(s)           

 

     Eliquis 2.5      0      Yes                      2.5 mg,           [facility]

[facility]



     mg oral      6-29                               PO, Q12H,           l



     tablet      20:02:                               tab, 0           [facility]



               00                                 Refill(s)           

 

     Eliquis 2.5      0      Yes                      2.5 mg,           [facility]

[facility]



     mg oral      6-29                               PO, Q12H,           l



     tablet      20:02:                               tab, 0           [facility]



               00                                 Refill(s)           

 

     Eliquis 2.5      0      Yes                      2.5 mg,           [facility]

[facility]



     mg oral      6-29                               PO, Q12H,           l



     tablet      20:02:                               tab, 0           [facility]



               00                                 Refill(s)           

 

     Eliquis 2.5      0      Yes                      2.5 mg,           [facility]

[facility]



     mg oral      6-29                               PO, Q12H,           l



     tablet      20:02:                               tab, 0           [facility]



               00                                 Refill(s)           

 

     Eliquis 2.5      0      Yes                      2.5 mg,           [facility]

[facility]



     mg oral      6-29                               PO, Q12H,           l



     tablet      20:02:                               tab, 0           [facility]



               00                                 Refill(s)           

 

     Eliquis 2.5      0      Yes                      2.5 mg,           [facility]

[facility]



     mg oral      6-29                               PO, Q12H,           l



     tablet      20:02:                               tab, 0           [facility]



               00                                 Refill(s)           

 

     Eliquis 2.5      0      Yes                      2.5 mg,           [facility]

[facility]



     mg oral      6-29                               PO, Q12H,           l



     tablet      20:02:                               tab, 0           [facility]



               00                                 Refill(s)           

 

     Eliquis 2.5      0      Yes                      2.5 mg,           [facility]

[facility]



     mg oral      6-29                               PO, Q12H,           l



     tablet      20:02:                               tab, 0           [facility]



               00                                 Refill(s)           

 

     doxycycline      -0 - No             200mg Q.5D Take 200           

[facility]



     (VIBRAMYCIN      6-26 06-25                          mg by           st



     ) 100 MG      13:07: 00:00                          mouth 2           [facility]

ta



     capsule      01   :00                           (two)           l



                                                  times a           



                                                  day. Take           



                                                  200mg (x2           



                                                  100mg           



                                                  capsules).           



                                                  Per            



                                                  patient           



                                                  started           



                                                  taking           



                                                  2022           

 

     glucosam/ch      -0 - No             1{tbl} Q.5D Take 1           [facility]

[facility]



     on-msm1/C/m                                tablet by           st



     ang/bos      13:07: 00:00                          mouth 2           [facility]

ta



     (OSTEO      01   :00                           (two)           l



     BI-FLEX                                         times a           



     TRIPLE                                         day.           



     STRENGTH                                                        



     ORAL)                                                        

 

     NON       2022- No             1{capsu QD   Take 1           [facility]



     FORMULARY      -25                le}       capsule by           st



               13:07: 00:00                          mouth           [facility]



               01   :00                           nightly.           l



                                                  Patient           



                                                  takes           



                                                  young           



                                                  living           



                                                  probiotic           



                                                  with 17           



                                                  billion           



                                                  active           



                                                  cultures           

 

     doxycycline      2022- No             200mg Q.5D Take 200           

[facility]



     (VIBRAMYCIN      6-26 06-25                          mg by           st



     ) 100 MG      13:07: 00:00                          mouth 2           [facility]

ta



     capsule      01   :00                           (two)           l



                                                  times a           



                                                  day. Take           



                                                  200mg (x2           



                                                  100mg           



                                                  capsules).           



                                                  Per            



                                                  patient           



                                                  started           



                                                  taking           



                                                  2022           

 

     glucosam/ch      -0 - No             1{tbl} Q.5D Take 1           [facility]

[facility]



     [place] [place]                                tablet by           st



     ang/[facility]      13:07: 00:00                          mouth 2           [facility]

ta



     (OSTEO      01   :00                           (two)           l



     BI-FLEX                                         times a           



     TRIPLE                                         day.           



     STRENGTH                                                        



     ORAL)                                                        

 

     NON       2022- No             1{capsu QD   Take 1           [facility]



     FORMULARY      -25                le}       capsule by           st



               13:07: 00:00                          mouth           [facility]



               01   :00                           nightly.           l



                                                  Patient           



                                                  takes           



                                                  young           



                                                  living           



                                                  probiotic           



                                                  with 17           



                                                  billion           



                                                  active           



                                                  cultures           

 

     folic acid      2022- No             1mg  QD   Take 1           [facility]

[facility]



     (FOLVITE) 1                                tablet (1           st



     MG tablet      00:00: 04:59                          mg total)           [facility]

[facility]



               00   :00                           by mouth           l



                                                  daily for           



                                                  30 days.           

 

     folic acid      2022- No             1mg  QD   Take 1           [facility]

[facility]



     (FOLVITE) 1                                tablet (1           st



     MG tablet      00:00: 04:59                          mg total)           [facility]

[facility]



               00   :00                           by mouth           l



                                                  daily for           



                                                  30 days.           

 

     methocarbam      2022- No             250mg [id number] Take 0.5     

      [facility]



     oL                             [id number] tablets           st



     (ROBAXIN)      00:00: 04:59                     3D   (250 mg           [facility]

[facility]



     500 MG      00   :00                           total) by           l



     tablet                                         mouth 3           



                                                  (three)           



                                                  times a           



                                                  day for 7           



                                                  days.           

 

     nitazoxanid      -0 - No             500mg Q.5D Take 1           [facility]

[facility]



     e (ALINIA)                                tablet           st



     500 MG      00:00: 04:59                          (500 mg           [facility]



     tablet      00   :00                           total) by           l



                                                  mouth 2           



                                                  (two)           



                                                  times a           



                                                  day for 7           



                                                  days.           

 

     methocarbam      -2022- No             250mg [id number] Take 0.5     

      [facility]



     oL                             [id number] tablets           st



     (ROBAXIN)      00:00: 04:59                     3D   (250 mg           [facility]

[facility]



     500 MG      00   :00                           total) by           l



     tablet                                         mouth 3           



                                                  (three)           



                                                  times a           



                                                  day for 7           



                                                  days.           

 

     nitazoxanid      -0 - No             500mg Q.5D Take 1           [facility]

[facility]



     e (ALINIA)                                tablet           st



     500 MG      00:00: 04:59                          (500 mg           [facility]



     tablet      00   :00                           total) by           l



                                                  mouth 2           



                                                  (two)           



                                                  times a           



                                                  day for 7           



                                                  days.           

 

     oxyCODONE      -2022- No        51062 5mg  Q4H  Take 1           [facility]

[facility]



     (ROXICODONE      -25 -01                          tablet (5           st



     ) 5 MG      00:00: 04:59                          mg total)           [facility]

ta



     immediate      00   :00                           by mouth           l



     release                                         every 4           



     tablet                                         (four)           



                                                  hours as           



                                                  needed for           



                                                  severe           



                                                  pain for           



                                                  up to 20           



                                                  doses           



                                                  .acute           



                                                  pain. Max           



                                                  Daily           



                                                  Amount: 30           



                                                  mg             

 

     oxyCODONE      -0 - No        [id number] 5mg  Q4H  Take 1           [facility]

[facility]



     (ROXICODONE      6-25 -01                          tablet (5           st



     ) 5 MG      00:00: 04:59                          mg total)           [facility]

ta



     immediate      00   :00                           by mouth           l



     release                                         every 4           



     tablet                                         (four)           



                                                  hours as           



                                                  needed for           



                                                  severe           



                                                  pain for           



                                                  up to 20           



                                                  doses           



                                                  .acute           



                                                  pain. Max           



                                                  Daily           



                                                  Amount: 30           



                                                  mg             

 

     tizanidine      2022-0      Yes                      4 mg = 1           [facility]

[facility]



     4 mg oral      5-16                               tab, PO,           l



     tablet      18:09:                               Bedtime,           [facility]



               00                                 PRN AS           



                                                  NEEDED FOR           



                                                  MUSCLE           



                                                  SPASM, #           



                                                  15 tab, 0           



                                                  Refill(s),           



                                                  Pharmacy:           



                                                  [facility] [facility]           



                                                  #[id number],           



                                                  165.1, cm,           



                                                  21           



                                                  15:23:00           



                                                  CST,           



                                                  Height,           



                                                  107.301,           



                                                  kg,            



                                                  21           



                                                  15:23:00           



                                                  CST,           



                                                  Weight           

 

     tizanidine      2022-0      Yes                      4 mg = 1           [facility]

[facility]



     4 mg oral      5-16                               tab, PO,           l



     tablet      18:09:                               Bedtime,           [facility]



               00                                 PRN AS           



                                                  NEEDED FOR           



                                                  MUSCLE           



                                                  SPASM, #           



                                                  15 tab, 0           



                                                  Refill(s),           



                                                  Pharmacy:           



                                                  [facility]           



                                                  #[id number],           



                                                  165.1, cm,           



                                                  21           



                                                  15:23:00           



                                                  CST,           



                                                  Height,           



                                                  107.301,           



                                                  kg,            



                                                  21           



                                                  15:23:00           



                                                  CST,           



                                                  Weight           

 

     tizanidine      2022-0      Yes                      4 mg = 1           [facility]

[facility]



     4 mg oral      5-16                               tab, PO,           l



     tablet      18:09:                               Bedtime,           [facility]



               00                                 PRN AS           



                                                  NEEDED FOR           



                                                  MUSCLE           



                                                  SPASM, #           



                                                  15 tab, 0           



                                                  Refill(s),           



                                                  Pharmacy:           



                                                  [facility]           



                                                  #[id number],           



                                                  165.1, cm,           



                                                  21           



                                                  15:23:00           



                                                  CST,           



                                                  Height,           



                                                  107.301,           



                                                  kg,            



                                                  21           



                                                  15:23:00           



                                                  CST,           



                                                  Weight           

 

     tizanidine      2022-0      Yes                      4 mg = 1           [facility]

[facility]



     4 mg oral      5-16                               tab, PO,           l



     tablet      18:09:                               Bedtime,           [facility]



               00                                 PRN AS           



                                                  NEEDED FOR           



                                                  MUSCLE           



                                                  SPASM, #           



                                                  15 tab, 0           



                                                  Refill(s),           



                                                  Pharmacy:           



                                                  [facility]           



                                                  #[id number],           



                                                  165.1, cm,           



                                                  21           



                                                  15:23:00           



                                                  CST,           



                                                  Height,           



                                                  107.301,           



                                                  kg,            



                                                  21           



                                                  15:23:00           



                                                  CST,           



                                                  Weight           

 

     tizanidine      2022-0      Yes                      4 mg = 1           [facility]

[facility]



     4 mg oral      5-16                               tab, PO,           l



     tablet      18:09:                               Bedtime,           [facility]



               00                                 PRN AS           



                                                  NEEDED FOR           



                                                  MUSCLE           



                                                  SPASM, #           



                                                  15 tab, 0           



                                                  Refill(s),           



                                                  Pharmacy:           



                                                  [facility]           



                                                  [facility]           



                                                  #[id number],           



                                                  165.1, cm,           



                                                  21           



                                                  15:23:00           



                                                  CST,           



                                                  Height,           



                                                  107.301,           



                                                  kg,            



                                                  21           



                                                  15:23:00           



                                                  CST,           



                                                  Weight           

 

     tizanidine      2022-0      Yes                      4 mg = 1           [facility]

[facility]



     4 mg oral      5-16                               tab, PO,           l



     tablet      18:09:                               Bedtime,           [facility]



               00                                 PRN AS           



                                                  NEEDED FOR           



                                                  MUSCLE           



                                                  SPASM, #           



                                                  15 tab, 0           



                                                  Refill(s),           



                                                  Pharmacy:           



                                                  [facility]           



                                                  [facility]           



                                                  #[id number],           



                                                  165.1, cm,           



                                                  21           



                                                  15:23:00           



                                                  CST,           



                                                  Height,           



                                                  107.301,           



                                                  kg,            



                                                  21           



                                                  15:23:00           



                                                  CST,           



                                                  Weight           

 

     tizanidine      2-0      Yes                      4 mg = 1           [facility]

[facility]



     4 mg oral      5-16                               tab, PO,           l



     tablet      18:09:                               Bedtime,           [facility]



               00                                 PRN AS           



                                                  NEEDED FOR           



                                                  MUSCLE           



                                                  SPASM, #           



                                                  15 tab, 0           



                                                  Refill(s),           



                                                  Pharmacy:           



                                                  [facility]           



                                                  [facility]           



                                                  #[id number],           



                                                  165.1, cm,           



                                                  21           



                                                  15:23:00           



                                                  CST,           



                                                  Height,           



                                                  107.301,           



                                                  kg,            



                                                  21           



                                                  15:23:00           



                                                  CST,           



                                                  Weight           

 

     tizanidine      2-0      Yes                      4 mg = 1           [facility]

[facility]



     4 mg oral      5-16                               tab, PO,           l



     tablet      18:09:                               Bedtime,           [facility]



               00                                 PRN AS           



                                                  NEEDED FOR           



                                                  MUSCLE           



                                                  SPASM, #           



                                                  15 tab, 0           



                                                  Refill(s),           



                                                  Pharmacy:           



                                                  [facility]           



                                                  [facility]           



                                                  #[id number],           



                                                  165.1, cm,           



                                                  21           



                                                  15:23:00           



                                                  CST,           



                                                  Height,           



                                                  107.301,           



                                                  kg,            



                                                  21           



                                                  15:23:00           



                                                  CST,           



                                                  Weight           

 

     nitrofurant      2022- No             100mg Q.5D Take 1           [facility]

[facility]



     oin,      3--15                          capsule           st



     macrocrysta      00:00: 04:59                          (100 mg           [facility]

[facility]



     l-monohydra      00   :00                           total) by           l



     te,                                          mouth 2           



     (MACROBID)                                         (two)           



     100 MG                                         times a           



     capsule                                         day for 3           



                                                  days.           

 

     nitrofurant      2022- No             100mg Q.5D Take 1           [facility]

[facility]



     oin,      3--15                          capsule           st



     macrocrysta      00:00: 04:59                          (100 mg           [facility]

[facility]



     l-monohydra      00   :00                           total) by           l



     te,                                          mouth 2           



     (MACROBID)                                         (two)           



     100 MG                                         times a           



     capsule                                         day for 3           



                                                  days.           

 

     lidocaine      2022- No             1{patch Q24H Place 1           [facility]

ethodi



     (LIDODERM)      3-09 04-09                }         patch on           st



     5 %       00:00: 04:59                          the skin           [facility]



               00   :00                           in the           l



                                                  morning           



                                                  for 30           



                                                  days.           



                                                  Remove &           



                                                  Discard           



                                                  patch           



                                                  within 12           



                                                  hours or           



                                                  as             



                                                  directed           



                                                  by MD.           

 

     lidocaine      2022- No             1{patch Q24H Place 1           [facility]

[facility]



     (LIDODERM)      3-09 04-09                }         patch on           st



     5 %       00:00: 04:59                          the skin           [facility]



               00   :00                           in the           l



                                                  morning           



                                                  for 30           



                                                  days.           



                                                  Remove &           



                                                  Discard           



                                                  patch           



                                                  within 12           



                                                  hours or           



                                                  as             



                                                  directed           



                                                  by MD.           

 

     traMADoL       No        26932 50mg [id number] Take 1          

 [facility]



     (ULTRAM) 50      3-09 03-                     [id number] tablet (50      

     st



     mg tablet      00:00: 04:59                     3D   mg total)           [facility]

[facility]



               00   :00                           by mouth           l



                                                  as needed           



                                                  in the           



                                                  morning           



                                                  and 1           



                                                  tablet (50           



                                                  mg total)           



                                                  as needed           



                                                  at noon           



                                                  and 1           



                                                  tablet (50           



                                                  mg total)           



                                                  as needed           



                                                  in the           



                                                  evening           



                                                  for            



                                                  moderate           



                                                  pain. Do           



                                                  all this           



                                                  for up to           



                                                  10             



                                                  days.acute           



                                                  pain.           

 

     traMADoL              98401 50mg [id number] Take 1          

 [facility]



     (ULTRAM) 50      3-09 03-20                     [id number] tablet (50      

     st



     mg tablet      00:00: 04:59                     3D   mg total)           [facility]

[facility]



               00   :00                           by mouth           l



                                                  as needed           



                                                  in the           



                                                  morning           



                                                  and 1           



                                                  tablet (50           



                                                  mg total)           



                                                  as needed           



                                                  at noon           



                                                  and 1           



                                                  tablet (50           



                                                  mg total)           



                                                  as needed           



                                                  in the           



                                                  evening           



                                                  for            



                                                  moderate           



                                                  pain. Do           



                                                  all this           



                                                  for up to           



                                                  10             



                                                  days.acute           



                                                  pain.           

 

     metoprolol      -0      Yes            25mg Q.5D Take 25 mg           [facility]

[facility]i



     tartrate      2-21                               by mouth 2           st



     (LOPRESSOR)      00:00:                               (two)           [facility]

ta



     25 mg      00                                 times a           l



     tablet                                         day.           

 

     metoprolol      -0      Yes            25mg Q.5D Take 25 mg           [facility]

ethodi



     tartrate      2-21                               by mouth 2           st



     (LOPRESSOR)      00:00:                               (two)           [facility]

ta



     25 mg      00                                 times a           l



     tablet                                         day.           

 

     Ondansetron      2022-0      Yes                      4 mg = 1           [facility]

[facility]



     4 MG Oral      1-07                               tab, PO,           l



     Tablet      22:01:                               BID, X 5           [facility]



     [Zofran]                                       day, # 10           



                                                  tab, 0           



                                                  Refill(s),           



                                                  Pharmacy:           



                                                  [facility]           



                                                  [facility]           



                                                  #[id number],           



                                                  165.1, cm,           



                                                  21           



                                                  15:23:00           



                                                  CST,           



                                                  Height,           



                                                  107.301,           



                                                  kg,            



                                                  21           



                                                  15:23:00           



                                                  CST,           



                                                  Weight           

 

     Ondansetron      2022-0      Yes                      4 mg = 1           [facility]

[facility]



     4 MG Oral      1-07                               tab, PO,           l



     Tablet      22:01:                               BID, X 5           [facility]



     [Zofran]                                       day, # 10           



                                                  tab, 0           



                                                  Refill(s),           



                                                  Pharmacy:           



                                                  [facility]           



                                                  [facility]           



                                                  #[id number],           



                                                  165.1, cm,           



                                                  21           



                                                  15:23:00           



                                                  CST,           



                                                  Height,           



                                                  107.301,           



                                                  kg,            



                                                  21           



                                                  15:23:00           



                                                  CST,           



                                                  Weight           

 

     Ondansetron      2022-0      Yes                      4 mg = 1           [facility]

[facility]



     4 MG Oral      1-07                               tab, PO,           l



     Tablet      22:01:                               BID, X 5           [facility]



     [Zofran]                                       day, # 10           



                                                  tab, 0           



                                                  Refill(s),           



                                                  Pharmacy:           



                                                  [facility]           



                                                  [facility]           



                                                  #[id number],           



                                                  165.1, cm,           



                                                  21           



                                                  15:23:00           



                                                  CST,           



                                                  Height,           



                                                  107.301,           



                                                  kg,            



                                                  21           



                                                  15:23:00           



                                                  CST,           



                                                  Weight           

 

     Ondansetron      2022-0      Yes                      4 mg = 1           [facility]

[facility]



     4 MG Oral      1-07                               tab, PO,           l



     Tablet      22:01:                               BID, X 5           [facility]



     [Zofran]                                       day, # 10           



                                                  tab, 0           



                                                  Refill(s),           



                                                  Pharmacy:           



                                                  [facility]           



                                                  [facility]           



                                                  #[id number],           



                                                  165.1, cm,           



                                                  21           



                                                  15:23:00           



                                                  CST,           



                                                  Height,           



                                                  107.301,           



                                                  kg,            



                                                  21           



                                                  15:23:00           



                                                  CST,           



                                                  Weight           

 

     Ondansetron      2022-0      Yes                      4 mg = 1           [facility]

[facility]



     4 MG Oral      1-07                               tab, PO,           l



     Tablet      22:01:                               BID, X 5           [facility]



     [Zofran]                                       day, # 10           



                                                  tab, 0           



                                                  Refill(s),           



                                                  Pharmacy:           



                                                  [facility]           



                                                  [facility]           



                                                  #[id number],           



                                                  165.1, cm,           



                                                  21           



                                                  15:23:00           



                                                  CST,           



                                                  Height,           



                                                  107.301,           



                                                  kg,            



                                                  21           



                                                  15:23:00           



                                                  CST,           



                                                  Weight           

 

     Ondansetron      2022-0      Yes                      4 mg = 1           [facility]

[facility]



     4 MG Oral      1-07                               tab, PO,           l



     Tablet      22:01:                               BID, X 5           [facility]



     [Zofran]      00                                 day, # 10           



                                                  tab, 0           



                                                  Refill(s),           



                                                  Pharmacy:           



                                                  [facility]           



                                                  [facility]           



                                                  #[id number],           



                                                  165.1, cm,           



                                                  21           



                                                  15:23:00           



                                                  CST,           



                                                  Height,           



                                                  107.301,           



                                                  kg,            



                                                  21           



                                                  15:23:00           



                                                  CST,           



                                                  Weight           

 

     Ondansetron      2022-0      Yes                      4 mg = 1           Me

[facility]



     4 MG Oral      1-07                               tab, PO,           l



     Tablet      22:01:                               BID, X 5           [facility]



     [Zofran]      00                                 day, # 10           



                                                  tab, 0           



                                                  Refill(s),           



                                                  Pharmacy:           



                                                  [facility]           



                                                  [facility]           



                                                  #[id number],           



                                                  165.1, cm,           



                                                  21           



                                                  15:23:00           



                                                  CST,           



                                                  Height,           



                                                  107.301,           



                                                  kg,            



                                                  21           



                                                  15:23:00           



                                                  CST,           



                                                  Weight           

 

     Ondansetron      2022-0      Yes                      4 mg = 1           Me

[facility]



     4 MG Oral      1-07                               tab, PO,           l



     Tablet      22:01:                               BID, X 5           [facility]



     [Zofran]      00                                 day, # 10           



                                                  tab, 0           



                                                  Refill(s),           



                                                  Pharmacy:           



                                                  [facility]           



                                                  [facility]           



                                                  #[id number],           



                                                  165.1, cm,           



                                                  21           



                                                  15:23:00           



                                                  CST,           



                                                  Height,           



                                                  107.301,           



                                                  kg,            



                                                  21           



                                                  15:23:00           



                                                  CST,           



                                                  Weight           

 

     atorvastati      2021      Yes                      10 mg = 1           M

[facility]



     n 10 mg      2-27                               tab, PO,           l



     oral tablet      21:45:                               Bedtime, #           

[facility]



               00                                 90 tab, 0           



                                                  Refill(s),           



                                                  Pharmacy:           



                                                  [facility]           



                                                  [facility]           



                                                  #[id number],           



                                                  165.1, cm,           



                                                  21           



                                                  15:23:00           



                                                  CST,           



                                                  Height,           



                                                  107.301,           



                                                  kg,            



                                                  21           



                                                  15:23:00           



                                                  CST,           



                                                  Weight           

 

     atorvastati      -      Yes                      10 mg = 1           M

[facility]



     n 10 mg      2-27                               tab, PO,           l



     oral tablet      21:45:                               Bedtime, #           

[facility]



               00                                 90 tab, 0           



                                                  Refill(s),           



                                                  Pharmacy:           



                                                  [facility]           



                                                  [facility]           



                                                  #[id number],           



                                                  165.1, cm,           



                                                  21           



                                                  15:23:00           



                                                  CST,           



                                                  Height,           



                                                  107.301,           



                                                  kg,            



                                                  21           



                                                  15:23:00           



                                                  CST,           



                                                  Weight           

 

     atorvastati      2021      Yes                      10 mg = 1           M

[facility]



     n 10 mg      2-27                               tab, PO,           l



     oral tablet      21:45:                               Bedtime, #           

[facility]



               00                                 90 tab, 0           



                                                  Refill(s),           



                                                  Pharmacy:           



                                                  [facility]           



                                                  [facility]           



                                                  #[id number],           



                                                  165.1, cm,           



                                                  21           



                                                  15:23:00           



                                                  CST,           



                                                  Height,           



                                                  107.301,           



                                                  kg,            



                                                  21           



                                                  15:23:00           



                                                  CST,           



                                                  Weight           

 

     atorvastati      2021      Yes                      10 mg = 1           M

[facility]



     n 10 mg      2-27                               tab, PO,           l



     oral tablet      21:45:                               Bedtime, #           

[facility]



               00                                 90 tab, 0           



                                                  Refill(s),           



                                                  Pharmacy:           



                                                  [facility]           



                                                  [facility]           



                                                  #[id number],           



                                                  165.1, cm,           



                                                  21           



                                                  15:23:00           



                                                  CST,           



                                                  Height,           



                                                  107.301,           



                                                  kg,            



                                                  21           



                                                  15:23:00           



                                                  CST,           



                                                  Weight           

 

     atorvastati      2021      Yes                      10 mg = 1           M

[facility]



     n 10 mg      2-27                               tab, PO,           l



     oral tablet      21:45:                               Bedtime, #           

[facility]



               00                                 90 tab, 0           



                                                  Refill(s),           



                                                  Pharm[facility]:           



                                                  [facility]           



                                                  #[id number],           



                                                  165.1, cm,           



                                                  21           



                                                  15:23:00           



                                                  CST,           



                                                  Height,           



                                                  107.301,           



                                                  kg,            



                                                  21           



                                                  15:23:00           



                                                  CST,           



                                                  Weight           

 

     atorvastati      2021      Yes                      10 mg = 1           M

[facility]



     n 10 mg      2-27                               tab, PO,           l



     oral tablet      21:45:                               Bedtime, #           

[facility]



               00                                 90 tab, 0           



                                                  Refill(s),           



                                                  Pharmacy:           



                                                  [facility]           



                                                  [facility]           



                                                  #[id number],           



                                                  165.1, cm,           



                                                  21           



                                                  15:23:00           



                                                  CST,           



                                                  Height,           



                                                  107.301,           



                                                  kg,            



                                                  21           



                                                  15:23:00           



                                                  CST,           



                                                  Weight           

 

     atorvasta2021      Yes                      10 mg = 1           M

[facility]



     n 10 mg      2-27                               tab, PO,           l



     oral tablet      21:45:                               Bedtime, #           

[facility]



               00                                 90 tab, 0           



                                                  Refill(s),           



                                                  Pharmacy:           



                                                  [facility]           



                                                  [facility]           



                                                  #[id number],           



                                                  165.1, cm,           



                                                  21           



                                                  15:23:00           



                                                  CST,           



                                                  Height,           



                                                  107.301,           



                                                  kg,            



                                                  21           



                                                  15:23:00           



                                                  CST,           



                                                  Weight           

 

     atorvastati      2021      Yes                      10 mg = 1           M

[facility]



     n 10 mg      2-27                               tab, PO,           l



     oral tablet      21:45:                               Bedtime, #           

[facility]



               00                                 90 tab, 0           



                                                  Refill(s),           



                                                  Pharmacy:           



                                                  [facility]           



                                                  [facility]           



                                                  #[id number],           



                                                  165.1, cm,           



                                                  21           



                                                  15:23:00           



                                                  CST,           



                                                  Height,           



                                                  107.301,           



                                                  kg,            



                                                  21           



                                                  15:23:00           



                                                  CST,           



                                                  Weight           

 

     tizanidine      2021      Yes                      4 mg = 1           [facility]

[facility]



     4 mg oral      2-27                               tab, PO,           l



     tablet      21:40:                               Bedtime,           [facility]



               00                                 PRN for           



                                                  muscle           



                                                  spasm, #           



                                                  30 tab, 0           



                                                  Refill(s),           



                                                  Pharmacy:           



                                                  [facility]           



                                                  #[id number],           



                                                  165.1, cm,           



                                                  21           



                                                  15:23:00           



                                                  CST,           



                                                  Height,           



                                                  107.301,           



                                                  kg,            



                                                  21           



                                                  15:23:00           



                                                  CST,           



                                                  Weight           

 

     Acetaminoph      2021      Yes                      1 tab, PO,           

[facility]



     en 325 MG /      2-27                               Q12H, PRN           l



     tramadol      21:40:                               for pain,           [facility]

[facility]



     hydrochlori      00                                 X 15 day,           



     de 37.5 MG                                         # 30 tab,           



     Oral Tablet                                         0              



     [Ultracet]                                         Refill(s),           



                                                  Pharmacy:           



                                                  [facility]           



                                                  #[id number],           



                                                  165.1, cm,           



                                                  21           



                                                  15:23:00           



                                                  CST,           



                                                  Height,           



                                                  107.301,           



                                                  kg,            



                                                  21           



                                                  15:23:00           



                                                  CST,           



                                                  Weight           

 

     tizanidine      2021      Yes                      4 mg = 1           [facility]

[facility]



     4 mg oral      2-27                               tab, PO,           l



     tablet      21:40:                               Bedtime,           [facility]



               00                                 PRN for           



                                                  muscle           



                                                  spasm, #           



                                                  30 tab, 0           



                                                  Refill(s),           



                                                  Pharm[facility]:           



                                                  [facility]           



                                                  [facility]           



                                                  #[id number],           



                                                  165.1, cm,           



                                                  21           



                                                  15:23:00           



                                                  CST,           



                                                  Height,           



                                                  107.301,           



                                                  kg,            



                                                  21           



                                                  15:23:00           



                                                  CST,           



                                                  Weight           

 

     Acetaminoph      2021      Yes                      1 tab, PO,           

[facility]



     en 325 MG /      2-27                               Q12H, PRN           l



     tramadol      21:40:                               for pain,           [facility]

[facility]



     hydrochlori      00                                 X 15 day,           



     de 37.5 MG                                         # 30 tab,           



     Oral Tablet                                         0              



     [Ultracet]                                         Refill(s),           



                                                  Pharmacy:           



                                                  [facility]           



                                                  #[id number],           



                                                  165.1, cm,           



                                                  21           



                                                  15:23:00           



                                                  CST,           



                                                  Height,           



                                                  107.301,           



                                                  kg,            



                                                  21           



                                                  15:23:00           



                                                  CST,           



                                                  Weight           

 

     tizanidine      2021      Yes                      4 mg = 1           [facility]

[facility]



     4 mg oral      2-27                               tab, PO,           l



     tablet      21:40:                               Bedtime,           [facility]



               00                                 PRN for           



                                                  muscle           



                                                  spasm, #           



                                                  30 tab, 0           



                                                  Refill(s),           



                                                  Pharmacy:           



                                                  [facility]           



                                                  #[id number],           



                                                  165.1, cm,           



                                                  21           



                                                  15:23:00           



                                                  CST,           



                                                  Height,           



                                                  107.301,           



                                                  kg,            



                                                  21           



                                                  15:23:00           



                                                  CST,           



                                                  Weight           

 

     Acetaminoph      2021      Yes                      1 tab, PO,           

[facility]



     en 325 MG /      2-27                               Q12H, PRN           l



     tramadol      21:40:                               for pain,           [facility]

[facility]



     hydrochlori      00                                 X 15 day,           



     de 37.5 MG                                         # 30 tab,           



     Oral Tablet                                         0              



     [Ultracet]                                         Refill(s),           



                                                  Pharmacy:           



                                                  [facility]           



                                                  #[id number],           



                                                  165.1, cm,           



                                                  21           



                                                  15:23:00           



                                                  CST,           



                                                  Height,           



                                                  107.301,           



                                                  kg,            



                                                  21           



                                                  15:23:00           



                                                  CST,           



                                                  Weight           

 

     tizanidine      2021      Yes                      4 mg = 1           [facility]

[facility]



     4 mg oral      2-27                               tab, PO,           l



     tablet      21:40:                               Bedtime,           [facility]



               00                                 PRN for           



                                                  muscle           



                                                  spasm, #           



                                                  30 tab, 0           



                                                  Refill(s),           



                                                  Pharmacy:           



                                                  [facility]           



                                                  #[id number],           



                                                  165.1, cm,           



                                                  21           



                                                  15:23:00           



                                                  CST,           



                                                  Height,           



                                                  107.301,           



                                                  kg,            



                                                  21           



                                                  15:23:00           



                                                  CST,           



                                                  Weight           

 

     Acetaminoph      2021      Yes                      1 tab, PO,           

[facility]



     en 325 MG /      2-27                               Q12H, PRN           l



     tramadol      21:40:                               for pain,           [facility]

[facility]



     hydrochlori      00                                 X 15 day,           



     de 37.5 MG                                         # 30 tab,           



     Oral Tablet                                         0              



     [Ultracet]                                         Refill(s),           



                                                  Pharmacy:           



                                                  [facility]           



                                                  [facility]           



                                                  #[id number],           



                                                  165.1, cm,           



                                                  21           



                                                  15:23:00           



                                                  CST,           



                                                  Height,           



                                                  107.301,           



                                                  kg,            



                                                  21           



                                                  15:23:00           



                                                  CST,           



                                                  Weight           

 

     tizanidine      2021      Yes                      4 mg = 1           [facility]

[facility]



     4 mg oral      2-27                               tab, PO,           l



     tablet      21:40:                               Bedtime,           [facility]



               00                                 PRN for           



                                                  muscle           



                                                  spasm, #           



                                                  30 tab, 0           



                                                  Refill(s),           



                                                  Pharmacy:           



                                                  [facility] [facility]           



                                                  #[id number],           



                                                  165.1, cm,           



                                                  21           



                                                  15:23:00           



                                                  CST,           



                                                  Height,           



                                                  107.301,           



                                                  kg,            



                                                  21           



                                                  15:23:00           



                                                  CST,           



                                                  Weight           

 

     Acetaminoph      2021      Yes                      1 tab, PO,           

[facility]



     en 325 MG /      2-27                               Q12H, PRN           l



     tramadol      21:40:                               for pain,           [facility]

[facility]



     hydrochlori      00                                 X 15 day,           



     de 37.5 MG                                         # 30 tab,           



     Oral Tablet                                         0              



     [Ultracet]                                         Refill(s),           



                                                  Pharmacy:           



                                                  [facility] [facility]           



                                                  #[id number],           



                                                  165.1, cm,           



                                                  21           



                                                  15:23:00           



                                                  CST,           



                                                  Height,           



                                                  107.301,           



                                                  kg,            



                                                  21           



                                                  15:23:00           



                                                  CST,           



                                                  Weight           

 

     tizanidine      2021      Yes                      4 mg = 1           [facility]

[facility]



     4 mg oral      2-27                               tab, PO,           l



     tablet      21:40:                               Bedtime,           [facility]



               00                                 PRN for           



                                                  muscle           



                                                  spasm, #           



                                                  30 tab, 0           



                                                  Refill(s),           



                                                  Pharm[facility]           



                                                  [facility]           



                                                  #[id number],           



                                                  165.1, cm,           



                                                  21           



                                                  15:23:00           



                                                  CST,           



                                                  Height,           



                                                  107.301,           



                                                  kg,            



                                                  21           



                                                  15:23:00           



                                                  CST,           



                                                  Weight           

 

     Acetaminoph      2021      Yes                      1 tab, PO,           

[facility]



     en 325 MG /      2-27                               Q12H, PRN           l



     tramadol      21:40:                               for pain,           [facility]

[facility]



     hydrochlori      00                                 X 15 day,           



     de 37.5 MG                                         # 30 tab,           



     Oral Tablet                                         0              



     [Ultracet]                                         Refill(s),           



                                                  Pharmacy:           



                                                  [facility]           



                                                  [facility]           



                                                  #[id number],           



                                                  165.1, cm,           



                                                  21           



                                                  15:23:00           



                                                  CST,           



                                                  Height,           



                                                  107.301,           



                                                  kg,            



                                                  21           



                                                  15:23:00           



                                                  CST,           



                                                  Weight           

 

     tizanidine      2021      Yes                      4 mg = 1           [facility]

[facility]



     4 mg oral      2-27                               tab, PO,           l



     tablet      21:40:                               Bedtime,           [facility]



               00                                 PRN for           



                                                  muscle           



                                                  spasm, #           



                                                  30 tab, 0           



                                                  Refill(s),           



                                                  Pharmacy:           



                                                  [facility]           



                                                  #[id number],           



                                                  165.1, cm,           



                                                  21           



                                                  15:23:00           



                                                  CST,           



                                                  Height,           



                                                  107.301,           



                                                  kg,            



                                                  21           



                                                  15:23:00           



                                                  CST,           



                                                  Weight           

 

     Acetaminoph      2021      Yes                      1 tab, PO,           

[facility]



     en 325 MG /      2-27                               Q12H, PRN           l



     tramadol      21:40:                               for pain,           [facility]

[facility]



     hydrochlori      00                                 X 15 day,           



     de 37.5 MG                                         # 30 tab,           



     Oral Tablet                                         0              



     [Ultracet]                                         Refill(s),           



                                                  [facility]           



                                                  [facility]           



                                                  #[id number],           



                                                  165.1, cm,           



                                                  21           



                                                  15:23:00           



                                                  CST,           



                                                  Height,           



                                                  107.301,           



                                                  kg,            



                                                  21           



                                                  15:23:00           



                                                  CST,           



                                                  Weight           

 

     tizanidine      2021      Yes                      4 mg = 1           [facility]

[facility]



     4 mg oral      2-27                               tab, PO,           l



     tablet      21:40:                               Bedtime,           [facility]



               00                                 PRN for           



                                                  muscle           



                                                  spasm, #           



                                                  30 tab, 0           



                                                  Refill(s),           



                                                  Pharmacy:           



                                                  [facility]           



                                                  #[id number],           



                                                  165.1, cm,           



                                                  21           



                                                  15:23:00           



                                                  CST,           



                                                  Height,           



                                                  107.301,           



                                                  kg,            



                                                  21           



                                                  15:23:00           



                                                  CST,           



                                                  Weight           

 

     Acetaminoph      2021      Yes                      1 tab, PO,           

[facility]



     en 325 MG /      2-27                               Q12H, PRN           l



     tramadol      21:40:                               for pain,           [facility]

[facility]



     hydrochlori      00                                 X 15 day,           



     de 37.5 MG                                         # 30 tab,           



     Oral Tablet                                         0              



     [Ultracet]                                         Refill(s),           



                                                  Pharmacy:           



                                                  [facility]           



                                                  #[id number],           



                                                  165.1, cm,           



                                                  21           



                                                  15:23:00           



                                                  CST,           



                                                  Height,           



                                                  107.301,           



                                                  kg,            



                                                  21           



                                                  15:23:00           



                                                  CST,           



                                                  Weight           

 

     sevelamer      2021      Yes                      1,600 mg =           [facility]

[facility]



     800 mg oral      2-27                               2 tab, PO,           l



     tablet      21:21:                               TID-Meals,           [facility]

nn



               00                                 # 180 tab,           



                                                  0              



                                                  Refill(s)           

 

     sevelamer      2021      Yes                      1,600 mg =           Me

[facility]



     800 mg oral      2-27                               2 tab, PO,           l



     tablet      21:21:                               TID-Meals,           [facility]

nn



               00                                 # 180 tab,           



                                                  0              



                                                  Refill(s)           

 

     sevelamer      2021      Yes                      1,600 mg =           Me

[facility]



     800 mg oral      2-27                               2 tab, PO,           l



     tablet      21:21:                               TID-Meals,           [facility]

nn



               00                                 # 180 tab,           



                                                  0              



                                                  Refill(s)           

 

     sevelamer      2021      Yes                      1,600 mg =           Me

[facility]



     800 mg oral      2-27                               2 tab, PO,           l



     tablet      21:21:                               TID-Meals,           [facility]

[facility]



               00                                 # 180 tab,           



                                                  0              



                                                  Refill(s)           

 

     sevelamer      2021      Yes                      1,600 mg =           Me

[facility]



     800 mg oral      2-27                               2 tab, PO,           l



     tablet      21:21:                               TID-Meals,           [facility]

nn



               00                                 # 180 tab,           



                                                  0              



                                                  Refill(s)           

 

     sevelamer      2021      Yes                      1,600 mg =           Me

[facility]



     800 mg oral      2-27                               2 tab, PO,           l



     tablet      21:21:                               TID-Meals,           [facility]

nn



               00                                 # 180 tab,           



                                                  0              



                                                  Refill(s)           

 

     sevelamer      2021      Yes                      1,600 mg =           [facility]

[facility]



     800 mg oral      2-27                               2 tab, PO,           l



     tablet      21:21:                               TID-Meals,           [facility]

nn



               00                                 # 180 tab,           



                                                  0              



                                                  Refill(s)           

 

     sevelamer      2021      Yes                      1,600 mg =           [facility]

[facility]



     800 mg oral      2-27                               2 tab, PO,           l



     tablet      21:21:                               TID-Meals,           [facility]

nn



               00                                 # 180 tab,           



                                                  0              



                                                  Refill(s)           

 

     Mupirocin      2021      Yes                      1 appl,           [facility]

[facility]



     0.02 MG/MG      -22                               TOP, TID,           l



     Topical      23:21:                               X 5 day, #           [facility]

[facility]



     Ointment      00                                 22 gm, 0           



                                                  Refill(s),           



                                                  Pharmacy:           



                                                  [facility]           



                                                  [facility]           



                                                  #[id number],           



                                                  165.1, cm,           



                                                  21           



                                                  14:08:00           



                                                  CST,           



                                                  Height,           



                                                  136.42,           



                                                  kg,            



                                                  21           



                                                  14:08:00           



                                                  CST,           



                                                  Weight           

 

     Mupirocin      2021      Yes                      1 appl,           [facility]

[facility]



     0.02 MG/MG      1-22                               TOP, TID,           l



     Topical      23:21:                               X 5 day, #           [facility]

[facility]



     Ointment      00                                 22 gm, 0           



                                                  Refill(s),           



                                                  Pharm[facility]           



                                                  [facility]           



                                                  #[id number],           



                                                  165.1, cm,           



                                                  21           



                                                  14:08:00           



                                                  CST,           



                                                  Height,           



                                                  136.42,           



                                                  kg,            



                                                  21           



                                                  14:08:00           



                                                  CST,           



                                                  Weight           

 

     Mupirocin      2021      Yes                      1 appl,           [facility]

[facility]



     0.02 MG/MG      1-22                               TOP, TID,           l



     Topical      23:21:                               X 5 day, #           [facility]

[facility]



     Ointment      00                                 22 gm, 0           



                                                  Refill(s),           



                                                  Pharmacy:           



                                                  [facility]           



                                                  #[id number],           



                                                  165.1, cm,           



                                                  21           



                                                  14:08:00           



                                                  CST,           



                                                  Height,           



                                                  136.42,           



                                                  kg,            



                                                  21           



                                                  14:08:00           



                                                  CST,           



                                                  Weight           

 

     Mupirocin      2021      Yes                      1 appl,           [facility]

[facility]



     0.02 MG/MG      1-22                               TOP, TID,           l



     Topical      23:21:                               X 5 day, #           [facility]

[facility]



     Ointment      00                                 22 gm, 0           



                                                  Refill(s),           



                                                  [facility]arm[facility]           



                                                  [facility]           



                                                  [id number],           



                                                  165.1, cm,           



                                                  21           



                                                  14:08:00           



                                                  CST,           



                                                  Height,           



                                                  136.42,           



                                                  kg,            



                                                  21           



                                                  14:08:00           



                                                  CST,           



                                                  Weight           

 

     Mupirocin      -      Yes                      1 appl,           [facility]

[facility]



     0.02 MG/MG      1-22                               TOP, TID,           l



     Topical      23:21:                               X 5 day, #           [facility]

d[facility]



     Ointment      00                                 22 gm, 0           



                                                  Refill(s),           



                                                  Pharmacy:           



                                                  [facility] [facility]           



                                                  #[id number],           



                                                  165.1, cm,           



                                                  21           



                                                  14:08:00           



                                                  CST,           



                                                  Height,           



                                                  136.42,           



                                                  kg,            



                                                  21           



                                                  14:08:00           



                                                  CST,           



                                                  Weight           

 

     Mupirocin      -      Yes                      1 appl,           [facility]

[facility]



     0.02 MG/MG      1-22                               TOP, TID,           l



     Topical      23:21:                               X 5 day, #           [facility]

daryl



     Ointment      00                                 22 gm, 0           



                                                  Refill(s),           



                                                  Pharmacy:           



                                                  [facility]           



                                                  [facility]           



                                                  #[id number],           



                                                  165.1, cm,           



                                                  21           



                                                  14:08:00           



                                                  CST,           



                                                  Height,           



                                                  136.42,           



                                                  kg,            



                                                  21           



                                                  14:08:00           



                                                  CST,           



                                                  Weight           

 

     Mupirocin      2021      Yes                      1 appl,           [facility]

[facility]



     0.02 MG/MG      1-22                               TOP, TID,           l



     Topical      23:21:                               X 5 day, #           [facility]

daryl



     Ointment      00                                 22 gm, 0           



                                                  Refill(s),           



                                                  Pharmacy:           



                                                  [facility]           



                                                  [facility]           



                                                  #[id number],           



                                                  165.1, cm,           



                                                  21           



                                                  14:08:00           



                                                  CST,           



                                                  Height,           



                                                  136.42,           



                                                  kg,            



                                                  21           



                                                  14:08:00           



                                                  CST,           



                                                  Weight           

 

     Mupirocin      2021      Yes                      1 appl,           [facility]

[facility]



     0.02 MG/MG      1-22                               TOP, TID,           l



     Topical      23:21:                               X 5 day, #           [facility]

d[facility]



     Ointment      00                                 22 gm, 0           



                                                  Refill(s),           



                                                  Pharmacy:           



                                                  [facility]           



                                                  [facility] ST[facility]           



                                                  #[id number],           



                                                  165.1, cm,           



                                                  21           



                                                  14:08:00           



                                                  CST,           



                                                  Height,           



                                                  136.42,           



                                                  kg,            



                                                  21           



                                                  14:08:00           



                                                  CST,           



                                                  Weight           

 

     bumetanide      2021      Yes                      2 mg = 1           [facility]

[facility]



     2 mg oral      1-19                               tab, PO,           l



     tablet      21:11:                               Daily, #           [facility]



               00                                 30 tab, 0           



                                                  Refill(s)           

 

     bumetanide      2021      Yes                      2 mg = 1           [facility]

[facility]



     2 mg oral      1-19                               tab, PO,           l



     tablet      21:11:                               Daily, #           [facility]



               00                                 30 tab, 0           



                                                  Refill(s)           

 

     bumetanide      2021      Yes                      2 mg = 1           [facility]

[facility]



     2 mg oral      1-19                               tab, PO,           l



     tablet      21:11:                               Daily, #           [facility]



               00                                 30 tab, 0           



                                                  Refill(s)           

 

     bumetanide      2021      Yes                      2 mg = 1           [facility]

[facility]



     2 mg oral      1-19                               tab, PO,           l



     tablet      21:11:                               Daily, #           [facility]



               00                                 30 tab, 0           



                                                  Refill(s)           

 

     bumetanide      2021      Yes                      2 mg = 1           [facility]

[facility]



     2 mg oral      1-19                               tab, PO,           l



     tablet      21:11:                               Daily, #           [facility]



               00                                 30 tab, 0           



                                                  Refill(s)           

 

     bumetanide      2021      Yes                      2 mg = 1           [facility]

[facility]



     2 mg oral      1-19                               tab, PO,           l



     tablet      21:11:                               Daily, #           [facility]



                                                30 tab, 0           



                                                  Refill(s)           

 

     bumetanide      2021      Yes                      2 mg = 1           [facility]

[facility]



     2 mg oral      1-19                               tab, PO,           l



     tablet      21:11:                               Daily, #           [facility]



                                                30 tab, 0           



                                                  Refill(s)           

 

     bumetanide      2021      Yes                      2 mg = 1           [facility]

[facility]



     2 mg oral      1-19                               tab, PO,           l



     tablet      21:11:                               Daily, #           [facility]



               00                                 30 tab, 0           



                                                  Refill(s)           

 

     allopurinol      2021      Yes                      100 mg = 1           

[facility]



     100 mg oral      1-19                               tab, PO,           l



     tablet      21:10:                               BID, # 60           [facility]

n



               00                                 tab, 0           



                                                  Refill(s)           

 

     gabapentin      2021      Yes                      200 mg = 2           [facility]

[facility]



     100 MG Oral      1-19                               cap, PO,           l



     Capsule      21:10:                               Bedtime, 0           [facility]

[facility]



               00                                 Refill(s)           

 

     allopurinol      2021      Yes                      100 mg = 1           

[facility]



     100 mg oral      1-19                               tab, PO,           l



     tablet      21:10:                               BID, # 60           [facility]

n



               00                                 tab, 0           



                                                  Refill(s)           

 

     gabapentin      2021      Yes                      200 mg = 2           [facility]

[facility]



     100 MG Oral      1-19                               cap, PO,           l



     Capsule      21:10:                               Bedtime, 0           [facility]

[facility]



               00                                 Refill(s)           

 

     allopurinol      2021      Yes                      100 mg = 1           

[facility]



     100 mg oral      1-19                               tab, PO,           l



     tablet      21:10:                               BID, # 60           [facility]

n



               00                                 tab, 0           



                                                  Refill(s)           

 

     gabapentin      2021      Yes                      200 mg = 2           [facility]

[facility]



     100 MG Oral      1-19                               cap, PO,           l



     Capsule      21:10:                               Bedtime, 0           [facility]

[facility]



               00                                 Refill(s)           

 

     allopurinol      2021      Yes                      100 mg = 1           

[facility]



     100 mg oral      1-19                               tab, PO,           l



     tablet      21:10:                               BID, # 60           [facility]

n



               00                                 tab, 0           



                                                  Refill(s)           

 

     gabapentin      2021      Yes                      200 mg = 2           [facility]

[facility]



     100 MG Oral      1-19                               cap, PO,           l



     Capsule      21:10:                               Bedtime, 0           [facility]

[facility]



               00                                 Refill(s)           

 

     allopurinol      2021      Yes                      100 mg = 1           

[facility]



     100 mg oral      1-19                               tab, PO,           l



     tablet      21:10:                               BID, # 60           [facility]

n



               00                                 tab, 0           



                                                  Refill(s)           

 

     gabapentin      2021      Yes                      200 mg = 2           [facility]

[facility]



     100 MG Oral      1-19                               cap, PO,           l



     Capsule      21:10:                               Bedtime, 0           [facility]

[facility]



               00                                 Refill(s)           

 

     allopurinol      2021      Yes                      100 mg = 1           

[facility]



     100 mg oral      1-19                               tab, PO,           l



     tablet      21:10:                               BID, # 60           [facility]

n



               00                                 tab, 0           



                                                  Refill(s)           

 

     gabapentin      2021      Yes                      200 mg = 2           [facility]

[facility]



     100 MG Oral      1-19                               cap, PO,           l



     Capsule      21:10:                               Bedtime, 0           [facility]

[facility]



               00                                 Refill(s)           

 

     allopurinol      2021      Yes                      100 mg = 1           

[facility]



     100 mg oral      1-19                               tab, PO,           l



     tablet      21:10:                               BID, # 60           [facility]

n



               00                                 tab, 0           



                                                  Refill(s)           

 

     gabapentin      2021      Yes                      200 mg = 2           [facility]

[facility]



     100 MG Oral      1-19                               cap, PO,           l



     Capsule      21:10:                               Bedtime, 0           [facility]

[facility]



               00                                 Refill(s)           

 

     allopurinol      2021      Yes                      100 mg = 1           

[facility]



     100 mg oral      1-19                               tab, PO,           l



     tablet      21:10:                               BID, # 60           [facility]

n



               00                                 tab, 0           



                                                  Refill(s)           

 

     gabapentin      2021      Yes                      200 mg = 2           [facility]

[facility]



     100 MG Oral      1-19                               cap, PO,           l



     Capsule      21:10:                               Bedtime, 0           [facility]

[facility]



               00                                 Refill(s)           

 

     midodrine      2021      Yes            10mg [id number] Take 10 mg       

    [facility]



     (PROAMATINE      1-13                          [id number] by mouth 3       

    st



     ) 10 MG      00:00:                          3D   (three)           [facility]



     tablet      00                                 times a           l



                                                  day.           

 

     midodrine      2021      Yes            10mg [id number] Take 10 mg       

    [facility]



     (PROAMATINE      1-13                          [id number] by mouth 3       

    st



     ) 10 MG      00:00:                          3D   (three)           [facility]



     tablet      00                                 times a           l



                                                  day.           

 

     BUMETanide      2021      Yes            2mg  QD   Take 2 mg           [facility]

[facility]



     (BUMEX) 2      1-12                               by mouth           st



     MG tablet      00:00:                               every           [facility]



               00                                 morning.           l

 

     digOXIN      2021      Yes            125ug [id number] Take 125          

 [facility]



     (LANOXIN)      -                          [id number] mcg by           st



     125 mcg      00:00:                          3W   mouth 3           [facility]



     (0.125 mg)      00                                 (three)           l



     tablet                                         times a           



                                                  week. On           



                                                  dialysis           



                                                  ,             



                                                  Thursday,           



                                                  Saturday           

 

     BUMETanide      2021      Yes            2mg  QD   Take 2 mg           [facility]

[facility]



     (BUMEX) 2                                     by mouth           st



     MG tablet      00:00:                               every           [facility]



               00                                 morning.           l

 

     digOXIN      2021      Yes            125ug Q.[id number] Take 125          

 [facility]



     (LANOXIN)                                [id number] mcg by           st



     125 mcg      00:00:                          3W   mouth 3           [facility]



     (0.125 mg)      00                                 (three)           l



     tablet                                         times a           



                                                  week. On           



                                                  dialysis           



                                                  ,             



                                                  Thursday,           



                                                  Saturday           

 

     carvediloL      2021- No                                      [facility]

i



     (COREG)                                               st



     3.125 MG      00:00: 00:00                                         [facility]



     tablet      00   :00                                          l

 

     carvediloL      2021- No                                      [facility]

i



     (COREG)                                               st



     3.125 MG      00:00: 00:00                                         [facility]



     tablet      00   :00                                          l

 

     ipratropium      2021- No        [id number] .5mg Q.25D Take 2.5      

     [facility]



     (ATROVENT)      0-05 02-28                          mL (0.5 mg           st



     0.02 %      00:00: 00:00                          total) by           [facility]

ta



     nebulizer      00   :00                           nebulizati           l



     solution                                         on 4           



                                                  (four)           



                                                  times a           



                                                  day.           

 

     ipratropium      2021- No        [id number] .5mg Q.25D Take 2.5      

     [facility]



     (ATROVENT)      0-05 02-28                          mL (0.5 mg           st



     0.02 %      00:00: 00:00                          total) by           [facility]

ta



     nebulizer      00   :00                           nebulizati           l



     solution                                         on 4           



                                                  (four)           



                                                  times a           



                                                  day.           

 

     QUEtiapine            Yes                      1 tablet,           [facility]

[facility]



     50 mg oral      3-30                               once a           l



     tablet      13:55:                               day, 0           [facility]



               00                                 Refill(s)           

 

     torsemide      2021-0      Yes                      1 tablet,           [facility]

[facility]



     20 mg oral      3-30                               twice a           l



     tablet      13:55:                               day, 0           [facility]



               00                                 Refill(s)           

 

     QUEtiapine      2021-0      Yes                      1 tablet,           [facility]

[facility]



     50 mg oral      3-30                               once a           l



     tablet      13:55:                               day, 0           [facility]



               00                                 Refill(s)           

 

     torsemide      2021-0      Yes                      1 tablet,           [facility]

[facility]



     20 mg oral      3-30                               twice a           l



     tablet      13:55:                               day, 0           [facility]



               00                                 Refill(s)           

 

     QUEtiapine      2021-0      Yes                      1 tablet,           [facility]

[facility]



     50 mg oral      3-30                               once a           l



     tablet      13:55:                               day, 0           [facility]



               00                                 Refill(s)           

 

     torsemide      2021-0      Yes                      1 tablet,           [facility]

[facility]



     20 mg oral      3-30                               twice a           l



     tablet      13:55:                               day, 0           [facility]



               00                                 Refill(s)           

 

     QUEtiapine      2021-0      Yes                      1 tablet,           [facility]

[facility]



     50 mg oral      3-30                               once a           l



     tablet      13:55:                               day, 0           [facility]



               00                                 Refill(s)           

 

     torsemide      2021-0      Yes                      1 tablet,           [facility]

[facility]



     20 mg oral      3-30                               twice a           l



     tablet      13:55:                               day, 0           [facility]



               00                                 Refill(s)           

 

     QUEtiapine      2021-0      Yes                      1 tablet,           [facility]

[facility]



     50 mg oral      3-30                               once a           l



     tablet      13:55:                               day, 0           [facility]



               00                                 Refill(s)           

 

     torsemide      2021-0      Yes                      1 tablet,           [facility]

[facility]



     20 mg oral      3-30                               twice a           l



     tablet      13:55:                               day, 0           [facility]



               00                                 Refill(s)           

 

     QUEtiapine      2021-0      Yes                      1 tablet,           [facility]

[facility]



     50 mg oral      3-30                               once a           l



     tablet      13:55:                               day, 0           [facility]



               00                                 Refill(s)           

 

     torsemide      2021-0      Yes                      1 tablet,           [facility]

[facility]



     20 mg oral      3-30                               twice a           l



     tablet      13:55:                               day, 0           [facility]



               00                                 Refill(s)           

 

     QUEtiapine      2021-0      Yes                      1 tablet,           [facility]

[facility]



     50 mg oral      3-30                               once a           l



     tablet      13:55:                               day, 0           [facility]



               00                                 Refill(s)           

 

     torsemide      2021-0      Yes                      1 tablet,           [facility]

[facility]



     20 mg oral      3-30                               twice a           l



     tablet      13:55:                               day, 0           [facility]



               00                                 Refill(s)           

 

     QUEtiapine      2021-0      Yes                      1 tablet,           [facility]

[facility]



     50 mg oral      3-30                               once a           l



     tablet      13:55:                               day, 0           [facility]



               00                                 Refill(s)           

 

     torsemide      -0      Yes                      1 tablet,           [facility]

[facility]



     20 mg oral      3-30                               twice a           l



     tablet      13:55:                               day, 0           [facility]



               00                                 Refill(s)           

 

     potassium      -0      Yes                      1 tablet,           [facility]

[facility]



     chloride 20      3-30                               once a           l



     mEq oral      13:54:                               day, 0           [facility]



     tablet,      00                                 Refill(s)           



     extended                                                        



     release                                                        



     (KCL)                                                        

 

     potassium      -0      Yes                      1 tablet,           [facility]

[facility]



     chloride 20      3-30                               once a           l



     mEq oral      13:54:                               day, 0           [facility]



     tablet,      00                                 Refill(s)           



     extended                                                        



     release                                                        



     (KCL)                                                        

 

     potassium      -0      Yes                      1 tablet,           [facility]

[facility]



     chloride 20      3-30                               once a           l



     mEq oral      13:54:                               day, 0           [facility]



     tablet,      00                                 Refill(s)           



     extended                                                        



     release                                                        



     (KCL)                                                        

 

     potassium      -0      Yes                      1 tablet,           [facility]

[facility]



     chloride 20      3-30                               once a           l



     mEq oral      13:54:                               day, 0           [facility]



     tablet,      00                                 Refill(s)           



     extended                                                        



     release                                                        



     (KCL)                                                        

 

     potassium      -0      Yes                      1 tablet,           [facility]

[facility]



     chloride 20      3-30                               once a           l



     mEq oral      13:54:                               day, 0           [facility]



     tablet,      00                                 Refill(s)           



     extended                                                        



     release                                                        



     (KCL)                                                        

 

     potassium      -0      Yes                      1 tablet,           [facility]

[facility]



     chloride 20      3-30                               once a           l



     mEq oral      13:54:                               day, 0           [facility]



     tablet,      00                                 Refill(s)           



     extended                                                        



     release                                                        



     (KCL)                                                        

 

     potassium      -0      Yes                      1 tablet,           [facility]

[facility]



     chloride 20      3-30                               once a           l



     mEq oral      13:54:                               day, 0           [facility]



     tablet,      00                                 Refill(s)           



     extended                                                        



     release                                                        



     (KCL)                                                        

 

     potassium      -0      Yes                      1 tablet,           [facility]

[facility]



     chloride 20      3-30                               once a           l



     mEq oral      13:54:                               day, 0           [facility]



     tablet,      00                                 Refill(s)           



     extended                                                        



     release                                                        



     (KCL)                                                        

 

     allopurinol      -0      Yes                      1/2            [facility]

[facility]



     300 mg oral      3-30                               tablet,           l



     tablet      13:53:                               once a           [facility]



               00                                 day, 0           



                                                  Refill(s)           

 

     carvedilol      -0      Yes                      1 tablet,           [facility]

[facility]



     3.125 mg      3-30                               twice a           l



     oral tablet      13:53:                               day, 0           [facility]

[facility]



               00                                 Refill(s)           

 

     Digoxin      -0      Yes                      1 tablet,           [facility]

[facility]



     0.125 MG      3-30                               once a           l



     Oral Tablet      13:53:                               day, 0           [facility]

[facility]



               00                                 Refill(s)           

 

     DULoxetine      -0      Yes                      1 capsule,           [facility]

[facility]



     60 mg oral      3-30                               once a           l



     delayed      13:53:                               day, 0           [facility]



     release      00                                 Refill(s)           



     capsule                                                        

 

     apixaban 5      0      Yes                      1 tablet,           [facility]

[facility]



     MG Oral      3-30                               twice a           l



     Tablet      13:53:                               day, 0           [facility]



     [Eliquis]      00                                 Refill(s)           

 

     gabapentin      0      Yes                      1 capsule,           [facility]

[facility]



     300 MG Oral      3-30                               twice a           l



     Capsule      13:53:                               day, 0           [facility]



               00                                 Refill(s)           

 

     metoprolol            Yes                      1 tablet,           [facility]

[facility]



     tartrate 50      3-30                               Twice a           l



     mg oral      13:53:                               day, 0           [facility]



     tablet      00                                 Refill(s)           

 

     midodrine 5            Yes                      1 tablet,           [facility]

[facility]



     mg oral      3-30                               twice a           l



     tablet      13:53:                               day, 0           [facility]



               00                                 Refill(s)           

 

     allopurinol            Yes                      1/2            [facility]

[facility]



     300 mg oral      3-30                               tablet,           l



     tablet      13:53:                               once a           [facility]



               00                                 day, 0           



                                                  Refill(s)           

 

     carvedilol      0      Yes                      1 tablet,           [facility]

[facility]



     3.125 mg      3-30                               twice a           l



     oral tablet      13:53:                               day, 0           [facility]

[facility]



               00                                 Refill(s)           

 

     Digoxin      0      Yes                      1 tablet,           [facility]

[facility]



     0.125 MG      3-30                               once a           l



     Oral Tablet      13:53:                               day, 0           [facility]

[facility]



               00                                 Refill(s)           

 

     DULoxetine      0      Yes                      1 capsule,           [facility]

[facility]



     60 mg oral      3-30                               once a           l



     delayed      13:53:                               day, 0           [facility]



     release      00                                 Refill(s)           



     capsule                                                        

 

     apixaban 5      0      Yes                      1 tablet,           [facility]

[facility]



     MG Oral      3-30                               twice a           l



     Tablet      13:53:                               day, 0           [facility]



     [Eliquis]      00                                 Refill(s)           

 

     gabapentin      -0      Yes                      1 capsule,           [facility]

[facility]



     300 MG Oral      3-30                               twice a           l



     Capsule      13:53:                               day, 0           [facility]



               00                                 Refill(s)           

 

     metoprolol      0      Yes                      1 tablet,           [facility]

[facility]



     tartrate 50      3-30                               Twice a           l



     mg oral      13:53:                               day, 0           [facility]



     tablet      00                                 Refill(s)           

 

     midodrine 5      0      Yes                      1 tablet,           [facility]

[facility]



     mg oral      3-30                               twice a           l



     tablet      13:53:                               day, 0           [facility]



               00                                 Refill(s)           

 

     allopurinol      0      Yes                      1/2            [facility]

[facility]



     300 mg oral      3-30                               tablet,           l



     tablet      13:53:                               once a           [facility]



               00                                 day, 0           



                                                  Refill(s)           

 

     carvedilol      0      Yes                      1 tablet,           [facility]

[facility]



     3.125 mg      3-30                               twice a           l



     oral tablet      13:53:                               day, 0           [facility]

[facility]



               00                                 Refill(s)           

 

     Digoxin            Yes                      1 tablet,           [facility]

[facility]



     0.125 MG      3-30                               once a           l



     Oral Tablet      13:53:                               day, 0           [facility]

[facility]



               00                                 Refill(s)           

 

     DULoxetine            Yes                      1 capsule,           [facility]

[facility]



     60 mg oral      3-30                               once a           l



     delayed      13:53:                               day, 0           [facility]



     release      00                                 Refill(s)           



     capsule                                                        

 

     apixaban 5            Yes                      1 tablet,           [facility]

[facility]



     MG Oral      3-30                               twice a           l



     Tablet      13:53:                               day, 0           [facility]



     [Eliquis]      00                                 Refill(s)           

 

     gabapentin      0      Yes                      1 capsule,           [facility]

[facility]



     300 MG Oral      3-30                               twice a           l



     Capsule      13:53:                               day, 0           [facility]



               00                                 Refill(s)           

 

     metoprolol      0      Yes                      1 tablet,           [facility]

[facility]



     tartrate 50      3-30                               Twice a           l



     mg oral      13:53:                               day, 0           [facility]



     tablet      00                                 Refill(s)           

 

     midodrine 5      0      Yes                      1 tablet,           [facility]

[facility]



     mg oral      3-30                               twice a           l



     tablet      13:53:                               day, 0           [facility]



               00                                 Refill(s)           

 

     allopurinol      0      Yes                      1/2            [facility]

[facility]



     300 mg oral      3-30                               tablet,           l



     tablet      13:53:                               once a           [facility]



               00                                 day, 0           



                                                  Refill(s)           

 

     carvedilol      0      Yes                      1 tablet,           [facility]

[facility]



     3.125 mg      3-30                               twice a           l



     oral tablet      13:53:                               day, 0           [facility]

[facility]



               00                                 Refill(s)           

 

     Digoxin      0      Yes                      1 tablet,           [facility]

[facility]



     0.125 MG      3-30                               once a           l



     Oral Tablet      13:53:                               day, 0           [facility]

[facility]



               00                                 Refill(s)           

 

     DULoxetine      0      Yes                      1 capsule,           [facility]

[facility]



     60 mg oral      3-30                               once a           l



     delayed      13:53:                               day, 0           [facility]



     release      00                                 Refill(s)           



     capsule                                                        

 

     apixaban 5            Yes                      1 tablet,           [facility]

[facility]



     MG Oral      3-30                               twice a           l



     Tablet      13:53:                               day, 0           [facility]



     [Eliquis]      00                                 Refill(s)           

 

     gabapentin            Yes                      1 capsule,           [facility]

[facility]



     300 MG Oral      3-30                               twice a           l



     Capsule      13:53:                               day, 0           [facility]



               00                                 Refill(s)           

 

     metoprolol            Yes                      1 tablet,           [facility]

[facility]



     tartrate 50      3-30                               Twice a           l



     mg oral      13:53:                               day, 0           [facility]



     tablet      00                                 Refill(s)           

 

     midodrine 5            Yes                      1 tablet,           [facility]

[facility]



     mg oral      3-30                               twice a           l



     tablet      13:53:                               day, 0           [facility]



               00                                 Refill(s)           

 

     allopurinol            Yes                      1/2            [facility]

[facility]



     300 mg oral      3-30                               tablet,           l



     tablet      13:53:                               once a           [facility]



               00                                 day, 0           



                                                  Refill(s)           

 

     carvedilol            Yes                      1 tablet,           [facility]

[facility]



     3.125 mg      3-30                               twice a           l



     oral tablet      13:53:                               day, 0           [facility]

[facility]



               00                                 Refill(s)           

 

     Digoxin            Yes                      1 tablet,           [facility]

[facility]



     0.125 MG      3-30                               once a           l



     Oral Tablet      13:53:                               day, 0           [facility]

[facility]



               00                                 Refill(s)           

 

     DULoxetine            Yes                      1 capsule,           [facility]

[facility]



     60 mg oral      3-30                               once a           l



     delayed      13:53:                               day, 0           [facility]



     release      00                                 Refill(s)           



     capsule                                                        

 

     apixaban 5            Yes                      1 tablet,           [facility]

[facility]



     MG Oral      3-30                               twice a           l



     Tablet      13:53:                               day, 0           [facility]



     [Eliquis]      00                                 Refill(s)           

 

     gabapentin            Yes                      1 capsule,           [facility]

[facility]



     300 MG Oral      3-30                               twice a           l



     Capsule      13:53:                               day, 0           [facility]



               00                                 Refill(s)           

 

     metoprolol      0      Yes                      1 tablet,           [facility]

[facility]



     tartrate 50      3-30                               Twice a           l



     mg oral      13:53:                               day, 0           [facility]



     tablet      00                                 Refill(s)           

 

     midodrine 5      -0      Yes                      1 tablet,           [facility]

[facility]



     mg oral      3-30                               twice a           l



     tablet      13:53:                               day, 0           [facility]



               00                                 Refill(s)           

 

     allopurinol      -0      Yes                      1/2            [facility]

[facility]



     300 mg oral      3-30                               tablet,           l



     tablet      13:53:                               once a           [facility]



               00                                 day, 0           



                                                  Refill(s)           

 

     carvedilol      -0      Yes                      1 tablet,           [facility]

[facility]



     3.125 mg      3-30                               twice a           l



     oral tablet      13:53:                               day, 0           [facility]

[facility]



               00                                 Refill(s)           

 

     Digoxin      0      Yes                      1 tablet,           [facility]

[facility]



     0.125 MG      3-30                               once a           l



     Oral Tablet      13:53:                               day, 0           [facility]

[facility]



               00                                 Refill(s)           

 

     DULoxetine            Yes                      1 capsule,           [facility]

[facility]



     60 mg oral      3-30                               once a           l



     delayed      13:53:                               day, 0           [facility]



     release      00                                 Refill(s)           



     capsule                                                        

 

     apixaban 5      0      Yes                      1 tablet,           [facility]

[facility]



     MG Oral      3-30                               twice a           l



     Tablet      13:53:                               day, 0           [facility]



     [Eliquis]      00                                 Refill(s)           

 

     gabapentin      -0      Yes                      1 capsule,           [facility]

[facility]



     300 MG Oral      3-30                               twice a           l



     Capsule      13:53:                               day, 0           [facility]



               00                                 Refill(s)           

 

     metoprolol      -0      Yes                      1 tablet,           [facility]

[facility]



     tartrate 50      3-30                               Twice a           l



     mg oral      13:53:                               day, 0           [facility]



     tablet      00                                 Refill(s)           

 

     midodrine 5      -0      Yes                      1 tablet,           [facility]

[facility]



     mg oral      3-30                               twice a           l



     tablet      13:53:                               day, 0           [facility]



               00                                 Refill(s)           

 

     allopurinol      -0      Yes                      1/2            [facility]

[facility]



     300 mg oral      3-30                               tablet,           l



     tablet      13:53:                               once a           [facility]



               00                                 day, 0           



                                                  Refill(s)           

 

     carvedilol      -0      Yes                      1 tablet,           [facility]

[facility]



     3.125 mg      3-30                               twice a           l



     oral tablet      13:53:                               day, 0           [facility]

[facility]



               00                                 Refill(s)           

 

     Digoxin      -0      Yes                      1 tablet,           [facility]

[facility]



     0.125 MG      3-30                               once a           l



     Oral Tablet      13:53:                               day, 0           [facility]

[facility]



               00                                 Refill(s)           

 

     DULoxetine            Yes                      1 capsule,           [facility]

[facility]



     60 mg oral      3-30                               once a           l



     delayed      13:53:                               day, 0           [facility]



     release      00                                 Refill(s)           



     capsule                                                        

 

     apixaban 5            Yes                      1 tablet,           Me

[facility]



     MG Oral      3-30                               twice a           l



     Tablet      13:53:                               day, 0           [facility]



     [Eliquis]      00                                 Refill(s)           

 

     gabapentin      0      Yes                      1 capsule,           M

[facility]



     300 MG Oral      3-30                               twice a           l



     Capsule      13:53:                               day, 0           [facility]



               00                                 Refill(s)           

 

     metoprolol            Yes                      1 tablet,           Me

[facility]



     tartrate 50      3-30                               Twice a           l



     mg oral      13:53:                               day, 0           [facility]



     tablet      00                                 Refill(s)           

 

     midodrine 5            Yes                      1 tablet,           M

[facility]



     mg oral      3-30                               twice a           l



     tablet      13:53:                               day, 0           [facility]



               00                                 Refill(s)           

 

     allopurinol            Yes                      1/2            [facility]

a



     300 mg oral      3-30                               tablet,           l



     tablet      13:53:                               once a           [facility]



               00                                 day, 0           



                                                  Refill(s)           

 

     carvedilol            Yes                      1 tablet,           [facility]

[facility]



     3.125 mg      3-30                               twice a           l



     oral tablet      13:53:                               day, 0           [facility]

d[facility]



               00                                 Refill(s)           

 

     Digoxin            Yes                      1 tablet,           [facility]

[facility]



     0.125 MG      3-30                               once a           l



     Oral Tablet      13:53:                               day, 0           [facility]

d[facility]



               00                                 Refill(s)           

 

     DULoxetine            Yes                      1 capsule,           [facility]

[facility]



     60 mg oral      3-30                               once a           l



     delayed      13:53:                               day, 0           [facility]



     release      00                                 Refill(s)           



     capsule                                                        

 

     apixaban 5            Yes                      1 tablet,           [facility]

[facility]



     MG Oral      3-30                               twice a           l



     Tablet      13:53:                               day, 0           [facility]



     [Eliquis]      00                                 Refill(s)           

 

     gabapentin      0      Yes                      1 capsule,           [facility]

[facility]



     300 MG Oral      3-30                               twice a           l



     Capsule      13:53:                               day, 0           [facility]



               00                                 Refill(s)           

 

     metoprolol      0      Yes                      1 tablet,           [facility]

[facility]



     tartrate 50      3-30                               Twice a           l



     mg oral      13:53:                               day, 0           [facility]



     tablet      00                                 Refill(s)           

 

     midodrine 5      2021-0      Yes                      1 tablet,           [facility]

[facility]



     mg oral      3-30                               twice a           l



     tablet      13:53:                               day, 0           [facility]



               00                                 Refill(s)           

 

     DULoxetine      2020      Yes                      60 mg = 1           [facility]

[facility]



     60 mg oral      1-25                               cap, PO,           l



     delayed      17:17:                               Daily, #           [facility]

n



     release      00                                 30 cap, 0           



     capsule                                         Refill(s)           

 

     Spironolact      2020      Yes                      25 mg = 1           [facility]

[facility]



     one 25 MG      1-25                               tab, PO,           l



     Oral Tablet      17:17:                               Daily, 0           [facility]

[facility]



     [Aldactone]      00                                 Refill(s)           

 

     DULoxetine      2020      Yes                      60 mg = 1           [facility]

[facility]



     60 mg oral      1-25                               cap, PO,           l



     delayed      17:17:                               Daily, #           [facility]

n



     release      00                                 30 cap, 0           



     capsule                                         Refill(s)           

 

     Spironolact      2020      Yes                      25 mg = 1           [facility]

[facility]



     one 25 MG      1-25                               tab, PO,           l



     Oral Tablet      17:17:                               Daily, 0           [facility]

[facility]



     [Aldactone]      00                                 Refill(s)           

 

     DULoxetine      2020      Yes                      60 mg = 1           [facility]

[facility]



     60 mg oral      1-25                               cap, PO,           l



     delayed      17:17:                               Daily, #           [facility]

n



     release      00                                 30 cap, 0           



     capsule                                         Refill(s)           

 

     Spironolact      2020      Yes                      25 mg = 1           M

[facility]



     one 25 MG      1-25                               tab, PO,           l



     Oral Tablet      17:17:                               Daily, 0           [facility]

[facility]



     [Aldactone]      00                                 Refill(s)           

 

     DULoxetine      2020      Yes                      60 mg = 1           [facility]

[facility]



     60 mg oral      1-25                               cap, PO,           l



     delayed      17:17:                               Daily, #           [facility]

n



     release      00                                 30 cap, 0           



     capsule                                         Refill(s)           

 

     Spironolact      2020      Yes                      25 mg = 1           M

[facility]



     one 25 MG      1-25                               tab, PO,           l



     Oral Tablet      17:17:                               Daily, 0           [facility]

[facility]



     [Aldactone]      00                                 Refill(s)           

 

     DULoxetine      2020      Yes                      60 mg = 1           [facility]

[facility]



     60 mg oral      1-25                               cap, PO,           l



     delayed      17:17:                               Daily, #           [facility]

n



     release      00                                 30 cap, 0           



     capsule                                         Refill(s)           

 

     Spironolact      2020      Yes                      25 mg = 1           M

[facility]



     one 25 MG      1-25                               tab, PO,           l



     Oral Tablet      17:17:                               Daily, 0           [facility]

[facility]



     [Aldactone]      00                                 Refill(s)           

 

     DULoxetine      -      Yes                      60 mg = 1           [facility]

[facility]



     60 mg oral      1-25                               cap, PO,           l



     delayed      17:17:                               Daily, #           [facility]

n



     release      00                                 30 cap, 0           



     capsule                                         Refill(s)           

 

     Spironolact      2020      Yes                      25 mg = 1           [facility]

[facility]



     one 25 MG      1-25                               tab, PO,           l



     Oral Tablet      17:17:                               Daily, 0           [facility]

[facility]



     [Aldactone]      00                                 Refill(s)           

 

     DULoxetine      2020      Yes                      60 mg = 1           [facility]

[facility]



     60 mg oral      1-25                               cap, PO,           l



     delayed      17:17:                               Daily, #           [facility]

n



     release      00                                 30 cap, 0           



     capsule                                         Refill(s)           

 

     Spironolact      2020      Yes                      25 mg = 1           M

[facility]



     one 25 MG      1-25                               tab, PO,           l



     Oral Tablet      17:17:                               Daily, 0           [facility]

[facility]



     [Aldactone]      00                                 Refill(s)           

 

     DULoxetine      2020      Yes                      60 mg = 1           [facility]

[facility]



     60 mg oral      1-25                               cap, PO,           l



     delayed      17:17:                               Daily, #           [facility]

n



     release      00                                 30 cap, 0           



     capsule                                         Refill(s)           

 

     Spironolact      2020      Yes                      25 mg = 1           M

[facility]



     one 25 MG      1-25                               tab, PO,           l



     Oral Tablet      17:17:                               Daily, 0           [facility]

[facility]



     [Aldactone]      00                                 Refill(s)           

 

     Digoxin      -      Yes                      0.125 mg,           [facility]

[facility]



     0.125 MG      1-25                               PO, Daily,           l



     Oral Tablet      17:13:                               # 30 tab,           H

[facility]



               00                                 0              



                                                  Refill(s)           

 

     Digoxin      -      Yes                      0.125 mg,           [facility]

[facility]



     0.125 MG      1-25                               PO, Daily,           l



     Oral Tablet      17:13:                               # 30 tab,           H

[facility]



               00                                 0              



                                                  Refill(s)           

 

     Digoxin      -      Yes                      0.125 mg,           [facility]

[facility]



     0.125 MG      1-25                               PO, Daily,           l



     Oral Tablet      17:13:                               # 30 tab,           H

[facility]



               00                                 0              



                                                  Refill(s)           

 

     Digoxin      -      Yes                      0.125 mg,           [facility]

[facility]



     0.125 MG      1-25                               PO, Daily,           l



     Oral Tablet      17:13:                               # 30 tab,           H

[facility]



               00                                 0              



                                                  Refill(s)           

 

     Digoxin      -      Yes                      0.125 mg,           [facility]

[facility]



     0.125 MG      1-25                               PO, Daily,           l



     Oral Tablet      17:13:                               # 30 tab,           H

[facility]



               00                                 0              



                                                  Refill(s)           

 

     Digoxin      2020-      Yes                      0.125 mg,           [facility]

[facility]



     0.125 MG      1-25                               PO, Daily,           l



     Oral Tablet      17:13:                               # 30 tab,           [facility]

[facility]



               00                                 0              



                                                  Refill(s)           

 

     Digoxin      2020-      Yes                      0.125 mg,           [facility]

[facility]



     0.125 MG      1-25                               PO, Daily,           l



     Oral Tablet      17:13:                               # 30 tab,           H

[facility]



               00                                 0              



                                                  Refill(s)           

 

     Digoxin      2020-      Yes                      0.125 mg,           [facility]

[facility]



     0.125 MG      1-25                               PO, Daily,           l



     Oral Tablet      17:13:                               # 30 tab,           [facility]

[facility]



               00                                 0              



                                                  Refill(s)           

 

     Mupirocin      2020      Yes                      See            [facility]



     0.02 MG/MG      0-13                               Instructio           l



     Topical      15:44:                               ns, APPLY           [facility]

[facility]



     Ointment      00                                 EXTERNALLY           



                                                  TO THE           



                                                  AFFECTED           



                                                  AREA TWICE           



                                                  DAILY, #           



                                                  66 gm, 1           



                                                  Refill(s),           



                                                  Pharmacy:           



                                                  [facility]           



                                                  #[id number],           



                                                  170.18,           



                                                  cm,            



                                                  20           



                                                  14:42:00           



                                                  CST,           



                                                  Height,           



                                                  164.318,           



                                                  kg,            



                                                  20           



                                                  14:42:00           



                                                  CST,           



                                                  Weight           

 

     Mupirocin      2020      Yes                      See            [facility]



     0.02 MG/MG      0-13                               Instructio           l



     Topical      15:44:                               ns, APPLY           [facility]

[facility]



     Ointment      00                                 EXTERNALLY           



                                                  TO THE           



                                                  AFFECTED           



                                                  AREA TWICE           



                                                  DAILY, #           



                                                  66 gm, 1           



                                                  Refill(s),           



                                                  Pharmacy:           



                                                  [facility]           



                                                  #[id number],           



                                                  170.18,           



                                                  cm,            



                                                  20           



                                                  14:42:00           



                                                  CST,           



                                                  Height,           



                                                  164.318,           



                                                  kg,            



                                                  20           



                                                  14:42:00           



                                                  CST,           



                                                  Weight           

 

     Mupirocin      2020      Yes                      See            [facility]



     0.02 MG/MG      0-13                               Instructio           l



     Topical      15:44:                               ns, APPLY           [facility]

[facility]



     Ointment      00                                 EXTERNALLY           



                                                  TO THE           



                                                  AFFECTED           



                                                  AREA TWICE           



                                                  DAILY, #           



                                                  66 gm, 1           



                                                  Refill(s),           



                                                  Pharmacy:           



                                                  [facility]           



                                                  #[id number],           



                                                  170.18,           



                                                  cm,            



                                                  20           



                                                  14:42:00           



                                                  CST,           



                                                  Height,           



                                                  164.318,           



                                                  kg,            



                                                  20           



                                                  14:42:00           



                                                  CST,           



                                                  Weight           

 

     Mupirocin      2020      Yes                      See            [facility]



     0.02 MG/MG      0-13                               Instructio           l



     Topical      15:44:                               ns, APPLY           [facility]

[facility]



     Ointment      00                                 EXTERNALLY           



                                                  TO THE           



                                                  AFFECTED           



                                                  AREA TWICE           



                                                  DAILY, #           



                                                  66 gm, 1           



                                                  Refill(s),           



                                                  Pharmacy:           



                                                  [facility] [facility]           



                                                  #[id number],           



                                                  170.18,           



                                                  cm,            



                                                  20           



                                                  14:42:00           



                                                  CST,           



                                                  Height,           



                                                  164.318,           



                                                  kg,            



                                                  20           



                                                  14:42:00           



                                                  CST,           



                                                  Weight           

 

     Mupirocin      2020-      Yes                      See            [facility]



     0.02 MG/MG      0-13                               Instructio           l



     Topical      15:44:                               ns, APPLY           [facility]

[facility]



     Ointment      00                                 EXTERNALLY           



                                                  TO THE           



                                                  AFFECTED           



                                                  AREA TWICE           



                                                  DAILY, #           



                                                  66 gm, 1           



                                                  Refill(s),           



                                                  Pharmacy:           



                                                  [facility] [facility] [facility]           



                                                  #[id number],           



                                                  170.18,           



                                                  cm,            



                                                  20           



                                                  14:42:00           



                                                  CST,           



                                                  Height,           



                                                  164.318,           



                                                  kg,            



                                                  20           



                                                  14:42:00           



                                                  CST,           



                                                  Weight           

 

     Mupirocin      2020-      Yes                      See            [facility]



     0.02 MG/MG      0-13                               Instructio           l



     Topical      15:44:                               ns, APPLY           [facility]

[facility]



     Ointment      00                                 EXTERNALLY           



                                                  TO THE           



                                                  AFFECTED           



                                                  AREA TWICE           



                                                  DAILY, #           



                                                  66 gm, 1           



                                                  Refill(s),           



                                                  Pharmacy:           



                                                  [facility] [facility]           



                                                  #[id number],           



                                                  170.18,           



                                                  cm,            



                                                  20           



                                                  14:42:00           



                                                  CST,           



                                                  Height,           



                                                  164.318,           



                                                  kg,            



                                                  20           



                                                  14:42:00           



                                                  CST,           



                                                  Weight           

 

     Mupirocin      2020-      Yes                      See            [facility]



     0.02 MG/MG      0-13                               Instructio           l



     Topical      15:44:                               ns, APPLY           [facility]

nn



     Ointment      00                                 EXTERNALLY           



                                                  TO THE           



                                                  AFFECTED           



                                                  AREA TWICE           



                                                  DAILY, #           



                                                  66 gm, 1           



                                                  Refill(s),           



                                                  Pharmacy:           



                                                  [facility]           



                                                  #[id number],           



                                                  170.18,           



                                                  cm,            



                                                  20           



                                                  14:42:00           



                                                  CST,           



                                                  Height,           



                                                  164.318,           



                                                  kg,            



                                                  20           



                                                  14:42:00           



                                                  CST,           



                                                  Weight           

 

     Mupirocin      2020-      Yes                      See            [facility]



     0.02 MG/MG      0-13                               Instructio           l



     Topical      15:44:                               ns, APPLY           [facility]

nn



     Ointment      00                                 EXTERNALLY           



                                                  TO THE           



                                                  AFFECTED           



                                                  AREA TWICE           



                                                  DAILY, #           



                                                  66 gm, 1           



                                                  Refill(s),           



                                                  Pharmacy:           



                                                  [facility]           



                                                  #[id number],           



                                                  170.18,           



                                                  cm,            



                                                  20           



                                                  14:42:00           



                                                  CST,           



                                                  Height,           



                                                  164.318,           



                                                  kg,            



                                                  20           



                                                  14:42:00           



                                                  CST,           



                                                  Weight           

 

     Nitrofurant      2020-0      Yes                      100 mg = 1           

[facility]



     oin 100 MG      8-09                               cap, PO,           l



     Oral      17:57:                               BID, X 10           [facility]



     Capsule      00                                 day, # 20           



     [Macrobid]                                         cap, 0           



                                                  Refill(s),           



                                                  [facility]           



                                                  [facility]           



                                                  #[id number],           



                                                  170.18,           



                                                  cm,            



                                                  20           



                                                  14:42:00           



                                                  CST,           



                                                  Height,           



                                                  164.318,           



                                                  kg,            



                                                  20           



                                                  14:42:00           



                                                  CST,           



                                                  Weight           

 

     Nitrofurant      2020-0      Yes                      100 mg = 1           

[facility]



     oin 100 MG      8-09                               cap, PO,           l



     Oral      17:57:                               BID, X 10           [facility]



     Capsule      00                                 day, # 20           



     [Macrobid]                                         cap, 0           



                                                  Refill(s),           



                                                  Pharmacy:           



                                                  [facility]           



                                                  #[id number],           



                                                  170.18,           



                                                  cm,            



                                                  20           



                                                  14:42:00           



                                                  CST,           



                                                  Height,           



                                                  164.318,           



                                                  kg,            



                                                  20           



                                                  14:42:00           



                                                  CST,           



                                                  Weight           

 

     Nitrofurant      2020-0      Yes                      100 mg = 1           

[facility]



     oin 100 MG      8-09                               cap, PO,           l



     Oral      17:57:                               BID, X 10           [facility]



     Capsule      00                                 day, # 20           



     [Macrobid]                                         cap, 0           



                                                  Refill(s),           



                                                  Pharmacy:           



                                                  [facility]           



                                                  #[id number],           



                                                  170.18,           



                                                  cm,            



                                                  20           



                                                  14:42:00           



                                                  CST,           



                                                  Height,           



                                                  164.318,           



                                                  kg,            



                                                  20           



                                                  14:42:00           



                                                  CST,           



                                                  Weight           

 

     Nitrofurant      2020-0      Yes                      100 mg = 1           

[facility]



     oin 100 MG      8-09                               cap, PO,           l



     Oral      17:57:                               BID, X 10           [facility]



     Capsule      00                                 day, # 20           



     [Macrobid]                                         cap, 0           



                                                  Refill(s),           



                                                  Pharmacy:           



                                                  [facility]           



                                                  #[id number],           



                                                  170.18,           



                                                  cm,            



                                                  20           



                                                  14:42:00           



                                                  CST,           



                                                  Height,           



                                                  164.318,           



                                                  kg,            



                                                  20           



                                                  14:42:00           



                                                  CST,           



                                                  Weight           

 

     Nitrofurant      2020-0      Yes                      100 mg = 1           

[facility]



     oin 100 MG      8-09                               cap, PO,           l



     Oral      17:57:                               BID, X 10           [facility]



     Capsule      00                                 day, # 20           



     [Macrobid]                                         cap, 0           



                                                  Refill(s),           



                                                  Pharmacy:           



                                                  [facility]           



                                                  [facility]           



                                                  #[id number],           



                                                  170.18,           



                                                  cm,            



                                                  20           



                                                  14:42:00           



                                                  CST,           



                                                  Height,           



                                                  164.318,           



                                                  kg,            



                                                  20           



                                                  14:42:00           



                                                  CST,           



                                                  Weight           

 

     Nitrofurant      2020-0      Yes                      100 mg = 1           

[facility]



     oin 100 MG      8-09                               cap, PO,           l



     Oral      17:57:                               BID, X 10           [facility]



     Capsule      00                                 day, # 20           



     [Macrobid]                                         cap, 0           



                                                  Refill(s),           



                                                  Pharm[facility]           



                                                  [facility]           



                                                  #[id number],           



                                                  170.18,           



                                                  cm,            



                                                  20           



                                                  14:42:00           



                                                  CST,           



                                                  Height,           



                                                  164.318,           



                                                  kg,            



                                                  20           



                                                  14:42:00           



                                                  CST,           



                                                  Weight           

 

     Nitrofurant      2020-0      Yes                      100 mg = 1           

[facility]



     oin 100 MG      8-09                               cap, PO,           l



     Oral      17:57:                               BID, X 10           [facility]



     Capsule      00                                 day, # 20           



     [Macrobid]                                         cap, 0           



                                                  Refill(s),           



                                                  Pharmacy:           



                                                  [facility]           



                                                  [facility]           



                                                  #[id number],           



                                                  170.18,           



                                                  cm,            



                                                  20           



                                                  14:42:00           



                                                  CST,           



                                                  Height,           



                                                  164.318,           



                                                  kg,            



                                                  20           



                                                  14:42:00           



                                                  CST,           



                                                  Weight           

 

     Nitrofurant      2020-0      Yes                      100 mg = 1           

[facility]



     oin 100 MG      8-09                               cap, PO,           l



     Oral      17:57:                               BID, X 10           [facility]



     Capsule      00                                 day, # 20           



     [Macrobid]                                         cap, 0           



                                                  Refill(s),           



                                                  Pharmacy:           



                                                  [facility]           



                                                  #[id number],           



                                                  170.18,           



                                                  cm,            



                                                  20           



                                                  14:42:00           



                                                  CST,           



                                                  Height,           



                                                  164.318,           



                                                  kg,            



                                                  20           



                                                  14:42:00           



                                                  CST,           



                                                  Weight           

 

     lisinopril      2020-0      Yes                      2.5 mg = 1           [facility]

[facility]



     2.5 mg oral      6-04                               tab, PO,           l



     tablet      23:26:                               Daily, #           [facility]



               00                                 30 tab, 2           



                                                  Refill(s),           



                                                  called to           



                                                  pharmacy           

 

     lisinopril      2020-0      Yes                      2.5 mg = 1           [facility]

[facility]



     2.5 mg oral      6-04                               tab, PO,           l



     tablet      23:26:                               Daily, #           [facility]



               00                                 30 tab, 2           



                                                  Refill(s),           



                                                  called to           



                                                  pharmacy           

 

     lisinopril      2020-0      Yes                      2.5 mg = 1           [facility]

[facility]



     2.5 mg oral      6-04                               tab, PO,           l



     tablet      23:26:                               Daily, #           [facility]



               00                                 30 tab, 2           



                                                  Refill(s),           



                                                  called to           



                                                  pharmacy           

 

     lisinopril      2020-0      Yes                      2.5 mg = 1           [facility]

[facility]



     2.5 mg oral      6-04                               tab, PO,           l



     tablet      23:26:                               Daily, #           [facility]



               00                                 30 tab, 2           



                                                  Refill(s),           



                                                  called to           



                                                  pharmacy           

 

     lisinopril      2020-0      Yes                      2.5 mg = 1           [facility]

[facility]



     2.5 mg oral      6-04                               tab, PO,           l



     tablet      23:26:                               Daily, #           [facility]



               00                                 30 tab, 2           



                                                  Refill(s),           



                                                  called to           



                                                  pharmacy           

 

     lisinopril      2020-0      Yes                      2.5 mg = 1           [facility]

[facility]



     2.5 mg oral      6-04                               tab, PO,           l



     tablet      23:26:                               Daily, #           [facility]



               00                                 30 tab, 2           



                                                  Refill(s),           



                                                  called to           



                                                  pharmacy           

 

     lisinopril      2020-0      Yes                      2.5 mg = 1           [facility]

[facility]



     2.5 mg oral      6-04                               tab, PO,           l



     tablet      23:26:                               Daily, #           [facility]



               00                                 30 tab, 2           



                                                  Refill(s),           



                                                  called to           



                                                  pharmacy           

 

     lisinopril      2020-0      Yes                      2.5 mg = 1           [facility]

[facility]



     2.5 mg oral      6-04                               tab, PO,           l



     tablet      23:26:                               Daily, #           [facility]



               00                                 30 tab, 2           



                                                  Refill(s),           



                                                  called to           



                                                  pharmacy           

 

     calcitriol      2020-0      Yes                      = 1 cap,           [facility]

[facility]



     0.25 mcg      5-20                               PO, Daily,           l



     oral      12:18:                               # 90 cap,           [facility]



     capsule      00                                 1              



                                                  Refill(s),           



                                                  Pharmacy:           



                                                  [facility]           



                                                  #[id number]           

 

     calcitriol      2020-0      Yes                      = 1 cap,           [facility]

[facility]



     0.25 mcg      5-20                               PO, Daily,           l



     oral      12:18:                               # 90 cap,           [facility]



     capsule      00                                 1              



                                                  Refill(s),           



                                                  Pharmacy:           



                                                  [facility]           



                                                  #[id number]           

 

     calcitriol      2020-0      Yes                      = 1 cap,           [facility]

[facility]



     0.25 mcg      5-20                               PO, Daily,           l



     oral      12:18:                               # 90 cap,           [facility]



     capsule      00                                 1              



                                                  Refill(s),           



                                                  Pharmacy:           



                                                  [facility]           



                                                  #[id number]           

 

     calcitriol      2020-0      Yes                      = 1 cap,           [facility]

[facility]



     0.25 mcg      5-20                               PO, Daily,           l



     oral      12:18:                               # 90 cap,           [facility]



     capsule      00                                 1              



                                                  Refill(s),           



                                                  Pharmacy:           



                                                  [facility]           



                                                  [facility]           



                                                  #[id number]           

 

     calcitriol      2020-0      Yes                      = 1 cap,           [facility]

[facility]



     0.25 mcg      5-20                               PO, Daily,           [facility]



     oral      12:18:                               # 90 cap,           [facility]



     capsule      00                                 1              



                                                  Refill(s),           



                                                  Pharmacy:           



                                                  [facility]           



                                                  [facility]           



                                                  #[id number]           

 

     calcitriol      2020-0      Yes                      = 1 cap,           [facility]

[facility]



     0.25 mcg      5-20                               PO, Daily,           l



     oral      12:18:                               # 90 cap,           [facility]



     capsule      00                                 1              



                                                  Refill(s),           



                                                  Pharmacy:           



                                                  [facility]           



                                                  [facility]           



                                                  #[id number]           

 

     calcitriol      2020-0      Yes                      = 1 cap,           [facility]

[facility]



     0.25 mcg      5-20                               PO, Daily,           l



     oral      12:18:                               # 90 cap,           [facility]



     capsule      00                                 1              



                                                  Refill(s),           



                                                  Pharmacy:           



                                                  [facility]           



                                                  [facility]           



                                                  #[id number]           

 

     calcitriol      2020-0      Yes                      = 1 cap,           [facility]

[facility]



     0.25 mcg      5-20                               PO, Daily,           l



     oral      12:18:                               # 90 cap,           [facility]



     capsule      00                                 1              



                                                  Refill(s),           



                                                  Pharm[facility]           



                                                  [facility]           



                                                  #[id number]           

 

     calcitriol      2020-0      Yes                      = 1 cap,           [facility]

[facility]



     0.25 mcg      4-16                               PO, Daily,           l



     oral      16:37:                               # 90           [facility]



     capsule      47                                 unknown           



                                                  unit,           



                                                  Pharmacy[facility]           



                                                  [facility]           



                                                  #[id number]           

 

     calcitriol      2020-0      Yes                      = 1 cap,           [facility]

[facility]



     0.25 mcg      4-16                               PO, Daily,           l



     oral      16:37:                               # 90           [facility]



     capsule      47                                 unknown           



                                                  unit,           



                                                  Pharmacy:           



                                                  [facility]           



                                                  #[id number]           

 

     calcitriol      2020-0      Yes                      = 1 cap,           [facility]

[facility]



     0.25 mcg      4-16                               PO, Daily,           l



     oral      16:37:                               # 90           [facility]



     capsule      47                                 unknown           



                                                  unit,           



                                                  Pharmacy:           



                                                  [facility]           



                                                  #[id number]           

 

     calcitriol      2020-0      Yes                      = 1 cap,           [facility]

[facility]



     0.25 mcg      4-16                               PO, Daily,           l



     oral      16:37:                               # 90           [facility]



     capsule      47                                 unknown           



                                                  unit,           



                                                  Pharmacy:           



                                                  [facility]           



                                                  #[id number]           

 

     calcitriol      2020-0      Yes                      = 1 cap,           [facility]

[facility]



     0.25 mcg      4-16                               PO, Daily,           l



     oral      16:37:                               # 90           [facility]



     capsule      47                                 unknown           



                                                  unit,           



                                                  Pharmacy:           



                                                  [facility]           



                                                  [facility]           



                                                  #[id number]           

 

     calcitriol      2020-0      Yes                      = 1 cap,           [facility]

[facility]



     0.25 mcg      4-16                               PO, Daily,           l



     oral      16:37:                               # 90           [facility]



     capsule      47                                 unknown           



                                                  unit,           



                                                  Pharmacy:           



                                                  [facility]           



                                                  [facility]           



                                                  #[id number]           

 

     calcitriol      2020-0      Yes                      = 1 cap,           [facility]

[facility]



     0.25 mcg      4-16                               PO, Daily,           l



     oral      16:37:                               # 90           [facility]



     capsule      47                                 unknown           



                                                  unit,           



                                                  Pharmacy:           



                                                  [facility]           



                                                  [facility]           



                                                  #[id number]           

 

     calcitriol      2020-0      Yes                      = 1 cap,           [facility]

[facility]



     0.25 mcg      4-16                               PO, Daily,           l



     oral      16:37:                               # 90           [facility]



     capsule      47                                 unknown           



                                                  unit,           



                                                  Pharmacy:           



                                                  [facility]           



                                                  [facility]           



                                                  #[id number]           

 

     Furosemide      2020-0      Yes                      = 1 tab,           [facility]

[facility]



     40 MG Oral      4-13                               PO, Every           l



     Tablet      20:06:                               Other Day,           [facility]

[facility]



               19                                 # 45 tab,           



                                                  Pharmacy:           



                                                  [facility]           



                                                  [facility]           



                                                  #[id number]           

 

     Furosemide      2020-0      Yes                      = 1 tab,           [facility]

[facility]



     40 MG Oral      4-13                               PO, Every           l



     Tablet      20:06:                               Other Day,           [facility]

[facility]



               19                                 # 45 tab,           



                                                  Pharmacy:           



                                                  [facility]           



                                                  [facility]           



                                                  #[id number]           

 

     Furosemide      2020-0      Yes                      = 1 tab,           [facility]

[facility]



     40 MG Oral      4-13                               PO, Every           l



     Tablet      20:06:                               Other Day,           [facility]

[facility]



               19                                 # 45 tab,           



                                                  Pharmacy:           



                                                  [facility]           



                                                  [facility]           



                                                  #[id number]           

 

     Furosemide      2020-0      Yes                      = 1 tab,           [facility]

[facility]



     40 MG Oral      4-13                               PO, Every           l



     Tablet      20:06:                               Other Day,           [facility]

[facility]



               19                                 # 45 tab,           



                                                  Pharmacy:           



                                                  [facility]           



                                                  [facility]           



                                                  #[id number]           

 

     Furosemide      2020-0      Yes                      = 1 tab,           [facility]

[facility]



     40 MG Oral      4-13                               PO, Every           l



     Tablet      20:06:                               Other Day,           [facility]

nn



               19                                 # 45 tab,           



                                                  Pharmacy:           



                                                  [facility]           



                                                  [facility]           



                                                  #[id number]           

 

     Furosemide      2020-0      Yes                      = 1 tab,           [facility]

[facility]



     40 MG Oral      4-13                               PO, Every           l



     Tablet      20:06:                               Other Day,           [facility]

[facility]



               19                                 # 45 tab,           



                                                  Pharmacy:           



                                                  [facility]           



                                                  [facility]           



                                                  #[id number]           

 

     Furosemide      2020-0      Yes                      = 1 tab,           [facility]

[facility]



     40 MG Oral      4-13                               PO, Every           l



     Tablet      20:06:                               Other Day,           [facility]

[facility]



               19                                 # 45 tab,           



                                                  Pharmacy:           



                                                  [facility]           



                                                  #[id number]           

 

     Furosemide      2020-0      Yes                      = 1 tab,           [facility]

[facility]



     40 MG Oral      4-13                               PO, Every           l



     Tablet      20:06:                               Other Day,           [facility]

[facility]



               19                                 # 45 tab,           



                                                  Pharmacy:           



                                                  [facility]           



                                                  #[id number]           

 

     prednisolon      2020-0      Yes                      1 drop in           [facility]

[facility]



     e acetate      4-10                               each eye,           l



     10 MG/ML      20:53:                               once           [facility]



     Ophthalmic      00                                 daily, 0           



     Suspension                                         Refill(s)           

 

     bromfenac      2020-0      Yes                      1 drop in           [facility]

[facility]



     0.7 MG/ML      4-10                               right eye,           l



     Ophthalmic      20:53:                               once           [facility]



     Solution      00                                 daily, 0           



     [Prolensa]                                         Refill(s)           

 

     prednisolon      2020-0      Yes                      1 drop in           [facility]

[facility]



     e acetate      4-10                               each eye,           l



     10 MG/ML      20:53:                               once           [facility]



     Ophthalmic      00                                 daily, 0           



     Suspension                                         Refill(s)           

 

     bromfenac      2020-0      Yes                      1 drop in           [facility]

[facility]



     0.7 MG/ML      4-10                               right eye,           l



     Ophthalmic      20:53:                               once           [facility]



     Solution      00                                 daily, 0           



     [Prolensa]                                         Refill(s)           

 

     prednisolon      2020-0      Yes                      1 drop in           [facility]

[facility]



     e acetate      4-10                               each eye,           l



     10 MG/ML      20:53:                               once           [facility]



     Ophthalmic      00                                 daily, 0           



     Suspension                                         Refill(s)           

 

     bromfenac      2020-0      Yes                      1 drop in           [facility]

[facility]



     0.7 MG/ML      4-10                               right eye,           l



     Ophthalmic      20:53:                               once           [facility]



     Solution      00                                 daily, 0           



     [Prolensa]                                         Refill(s)           

 

     prednisolon      2020-0      Yes                      1 drop in           [facility]

[facility]



     e acetate      4-10                               each eye,           l



     10 MG/ML      20:53:                               once           [facility]



     Ophthalmic      00                                 daily, 0           



     Suspension                                         Refill(s)           

 

     bromfenac      2020-0      Yes                      1 drop in           [facility]

[facility]



     0.7 MG/ML      4-10                               right eye,           l



     Ophthalmic      20:53:                               once           [facility]



     Solution      00                                 daily, 0           



     [Prolensa]                                         Refill(s)           

 

     prednisolon      2020-0      Yes                      1 drop in           [facility]

[facility]



     e acetate      4-10                               each eye,           l



     10 MG/ML      20:53:                               once           [facility]



     Ophthalmic      00                                 daily, 0           



     Suspension                                         Refill(s)           

 

     bromfenac      2020-0      Yes                      1 drop in           [facility]

[facility]



     0.7 MG/ML      4-10                               right eye,           l



     Ophthalmic      20:53:                               once           [facility]



     Solution      00                                 daily, 0           



     [Prolensa]                                         Refill(s)           

 

     prednisolon      2020-0      Yes                      1 drop in           [facility]

[facility]



     e acetate      4-10                               each eye,           l



     10 MG/ML      20:53:                               once           [facility]



     Ophthalmic      00                                 daily, 0           



     Suspension                                         Refill(s)           

 

     bromfenac      2020-0      Yes                      1 drop in           [facility]

[facility]



     0.7 MG/ML      4-10                               right eye,           l



     Ophthalmic      20:53:                               once           [facility]



     Solution      00                                 daily, 0           



     [Prolensa]                                         Refill(s)           

 

     prednisolon      2020-0      Yes                      1 drop in           [facility]

[facility]



     e acetate      4-10                               each eye,           l



     10 MG/ML      20:53:                               once           [facility]



     Ophthalmic      00                                 daily, 0           



     Suspension                                         Refill(s)           

 

     bromfenac      2020-0      Yes                      1 drop in           [facility]

[facility]



     0.7 MG/ML      4-10                               right eye,           l



     Ophthalmic      20:53:                               once           [facility]



     Solution      00                                 daily, 0           



     [Prolensa]                                         Refill(s)           

 

     prednisolon      2020-0      Yes                      1 drop in           [facility]

[facility]



     e acetate      4-10                               each eye,           l



     10 MG/ML      20:53:                               once           [facility]



     Ophthalmic      00                                 daily, 0           



     Suspension                                         Refill(s)           

 

     bromfenac      2020-0      Yes                      1 drop in           [facility]

[facility]



     0.7 MG/ML      4-10                               right eye,           l



     Ophthalmic      20:53:                               once           [facility]



     Solution      00                                 daily, 0           



     [Prolensa]                                         Refill(s)           

 

     Furosemide      2020-0      Yes                      = 1 tab,           [facility]

[facility]



     40 MG Oral      1-23                               PO, Every           l



     Tablet      15:13:                               Other Day,           [facility]



               34                                 # 45 tab,           



                                                  Pharmacy:           



                                                  [facility]           



                                                  #[id number]           

 

     Furosemide      2020-0      Yes                      = 1 tab,           [facility]

[facility]



     40 MG Oral      1-23                               PO, Every           l



     Tablet      15:13:                               Other Day,           [facility]



               34                                 # 45 tab,           



                                                  [facility]           



                                                  [email or web address]           



                                                  #[id number]           

 

     Furosemide      2020-0      Yes                      = 1 tab,           [facility]

[facility]



     40 MG Oral      1-23                               PO, Every           l



     Tablet      15:13:                               Other Day,           [facility]



               34                                 # 45 tab,           



                                                  Pharmacy:           



                                                  [email or web address]           



                                                  #[id number]           

 

     Furosemide      2020-0      Yes                      = 1 tab,           [facility]

[facility]



     40 MG Oral      1-23                               PO, Every           l



     Tablet      15:13:                               Other Day,           [facility]



               34                                 # 45 tab,           



                                                  Pharmacy:           



                                                  [facility]           



                                                  #[id number]           

 

     Furosemide      2020-0      Yes                      = 1 tab,           [facility]

[facility]



     40 MG Oral      1-23                               PO, Every           l



     Tablet      15:13:                               Other Day,           [facility]

nn



               34                                 # 45 tab,           



                                                  Pharmacy:           



                                                  [facility]           



                                                  #[id number]           

 

     Furosemide      2020-0      Yes                      = 1 tab,           [facility]

[facility]



     40 MG Oral      1-23                               PO, Every           l



     Tablet      15:13:                               Other Day,           [facility]

nn



               34                                 # 45 tab,           



                                                  Pharmacy:           



                                                  [facility]           



                                                  #[id number]           

 

     Furosemide      2020-0      Yes                      = 1 tab,           [facility]

[facility]



     40 MG Oral      1-23                               PO, Every           l



     Tablet      15:13:                               Other Day,           [facility]

nn



               34                                 # 45 tab,           



                                                  Pharmacy:           



                                                  [facility]           



                                                  #[id number]           

 

     Furosemide      2020-0      Yes                      = 1 tab,           [facility]

[facility]



     40 MG Oral      1-23                               PO, Every           l



     Tablet      15:13:                               Other Day,           [facility]

nn



               34                                 # 45 tab,           



                                                  Pharmacy:           



                                                  [facility]           



                                                  #[id number]           

 

     Mupirocin      2019      Yes                      See            [facility]



     0.02 MG/MG      2-27                               Instructio           l



     Topical      19:11:                               ns, # 66           [facility]

n



     Ointment      15                                 gm, APPLY           



                                                  EXTERNALLY           



                                                  TO THE           



                                                  AFFECTED           



                                                  AREA TWICE           



                                                  DAILY,           



                                                  Pharmacy:           



                                                  [facility]           



                                                  #[id number]           

 

     Mupirocin      2019      Yes                      See            [facility]



     0.02 MG/MG      2-27                               Instructio           l



     Topical      19:11:                               ns, # 66           Donny

n



     Ointment      15                                 gm, APPLY           



                                                  EXTERNALLY           



                                                  TO THE           



                                                  AFFECTED           



                                                  AREA TWICE           



                                                  DAILY,           



                                                  Pharmacy:           



                                                  [facility]           



                                                  [facility]           



                                                  #[id number]           

 

     Mupirocin      2019      Yes                      See            [facility]



     0.02 MG/MG      2-27                               Instructio           l



     Topical      19:11:                               ns, # 66           Donny

n



     Ointment      15                                 gm, APPLY           



                                                  EXTERNALLY           



                                                  TO THE           



                                                  AFFECTED           



                                                  AREA TWICE           



                                                  DAILY,           



                                                  Pharmacy:           



                                                  [facility]           



                                                  [facility]           



                                                  [address] [place], [place] [address]rocin      2019      Yes                      See            [facility]



     0.02 MG/MG      2-27                               Instructio           l



     Topical      19:11:                               ns, # 66           Donny

n



     Ointment      15                                 gm, APPLY           



                                                  EXTERNALLY           



                                                  TO THE           



                                                  AFFECTED           



                                                  AREA TWICE           



                                                  DAILY,           



                                                  Pharmacy:           



                                                  [facility]           



                                                  [facility]           



                                                  #[id number]           

 

     Mupirocin      2019      Yes                      See            [facility]



     0.02 MG/MG      2-27                               Instructio           l



     Topical      19:11:                               ns, # 66           Donny

n



     Ointment      15                                 gm, APPLY           



                                                  EXTERNALLY           



                                                  TO THE           



                                                  AFFECTED           



                                                  AREA TWICE           



                                                  DAILY,           



                                                  Pharmacy:           



                                                  [facility]           



                                                  [facility]           



                                                  #[id number]           

 

     Mupirocin      2019      Yes                      See            [facility]



     0.02 MG/MG      2-27                               Instructio           l



     Topical      19:11:                               ns, # 66           Donny

n



     Ointment      15                                 gm, APPLY           



                                                  EXTERNALLY           



                                                  TO THE           



                                                  AFFECTED           



                                                  AREA TWICE           



                                                  DAILY,           



                                                  Pharmacy:           



                                                  [facility]           



                                                  [facility]           



                                                  #[id number]           

 

     Mupirocin      2019      Yes                      See            [facility]



     0.02 MG/MG      2-27                               Instructio           l



     Topical      19:11:                               ns, # 66           Donny

n



     Ointment      15                                 gm, APPLY           



                                                  EXTERNALLY           



                                                  TO THE           



                                                  AFFECTED           



                                                  AREA TWICE           



                                                  DAILY,           



                                                  Pharmacy:           



                                                  [facility]           



                                                  [facility]           



                                                  #[id number]           

 

     [facility]      [facility]                      [facility]            [facility]



     0.02 MG/MG      2-27                               Instructio           l



     Topical      19:11:                               ns, # 66           Donny

n



     Ointment      15                                 gm, APPLY           



                                                  EXTERNALLY           



                                                  TO THE           



                                                  AFFECTED           



                                                  AREA TWICE           



                                                  DAILY,           



                                                  Pharmacy:           



                                                  [facility]           



                                                  [facility]           



                                                  #[id number]           

 

     Nitrofurant      2019      Yes                      100 mg = 1           

[facility]



     oin 100 MG      2-13                               cap, PO,           l



     Oral      01:44:                               BID, X 5           [facility]



     Capsule      00                                 day, # 10           



     [Macrodanti                                         cap, 0           



     n]                                           Refill(s),           



                                                  Pharmacy:           



                                                  [facility]           



                                                  [facility]           



                                                  #[id number]           

 

     [facility]      2019      Yes                      100 mg = 1           

[facility]



     oin 100 MG      2-13                               cap, PO,           l



     Oral      01:44:                               BID, X 5           [facility]



     Capsule      00                                 day, # 10           



     [Macrodanti                                         cap, 0           



     n]                                           Refill(s),           



                                                  Pharmacy:           



                                                  [facility]           



                                                  [facility]           



                                                  #[id number]           

 

     Nitrofurant      2019      Yes                      100 mg = 1           

[facility]



     oin 100 MG      2-13                               cap, PO,           l



     Oral      01:44:                               BID, X 5           [facility]



     Capsule      00                                 day, # 10           



     [Macrodanti                                         cap, 0           



     n]                                           Refill(s),           



                                                  Pharmacy:           



                                                  [facility]           



                                                  [facility]           



                                                  #[id number]           

 

     Nitrofurant      2019      Yes                      100 mg = 1           

[facility]



     oin 100 MG      2-13                               cap, PO,           l



     Oral      01:44:                               BID, X 5           [facility]



     Capsule      00                                 day, # 10           



     [Macrodanti                                         cap, 0           



     n]                                           Refill(s),           



                                                  Pharmacy:           



                                                  [facility]           



                                                  [facility]           



                                                  #[id number]           

 

     Nitrofurant      2019      Yes                      100 mg = 1           

[facility]



     oin 100 MG      2-13                               cap, PO,           l



     Oral      01:44:                               BID, X 5           [facility]



     Capsule      00                                 day, # 10           



     [Macrodanti                                         cap, 0           



     n]                                           Refill(s),           



                                                  Pharmacy:           



                                                  [facility]           



                                                  [facility]           



                                                  #[id number]           

 

     Nitrofurant      2019      Yes                      100 mg = 1           

[facility]



     oin 100 MG      2-13                               cap, PO,           l



     Oral      01:44:                               BID, X 5           [facility]



     Capsule      00                                 day, # 10           



     [Macrodanti                                         cap, 0           



     n]                                           Refill(s),           



                                                  Pharmacy:           



                                                  [facility]           



                                                  [facility]           



                                                  #[id number]           

 

     Nitrofurant      2019      Yes                      100 mg = 1           

[facility]



     oin 100 MG      2-13                               cap, PO,           l



     Oral      01:44:                               BID, X 5           [facility]



     Capsule      00                                 day, # 10           



     [Macrodanti                                         cap, 0           



     n]                                           Refill(s),           



                                                  Pharmacy:           



                                                  [facility]           



                                                  [facility]           



                                                  #[id number]           

 

     Nitrofurant      2019      Yes                      100 mg = 1           

[facility]



     oin 100 MG      2-13                               cap, PO,           l



     Oral      01:44:                               BID, X 5           [facility]



     Capsule      00                                 day, # 10           



     [Macrodanti                                         cap, 0           



     n]                                           Refill(s),           



                                                  Pharmacy:           



                                                  [facility]           



                                                  [facility]           



                                                  #[id number]           

 

     apixaban 2019      Yes                      5 mg, PO,           [facility]

[facility]



     MG Oral      0-25                               Q12H, 0           l



     Tablet      15:07:                               Refill(s)           Donny

n



     [Eliquis]      00                                                

 

     metoprolol      2019      Yes                      50 mg = 1           [facility]

[facility]



     tartrate 50      0-25                               tab, PO,           l



     mg oral      15:07:                               BID, # 180           [facility]

daryl



     tablet      00                                 tab, 0           



                                                  Refill(s)           

 

     Diltiazem      2019      Yes                      180 mg = 1           [facility]

[facility]



     Hydrochlori      0-25                               cap, PO,           l



     de       15:07:                               Daily, #           [facility]

daryl



     mg/24 hours      00                                 30 cap, 0           



     oral                                         Refill(s)           



     capsule,                                                        



     extended                                                        



     release                                                        

 

     tramadol      2019      Yes                      150 mg = 1           [facility]

[facility]



     150 mg/24      0-25                               cap, PO,           l



     hours oral      15:07:                               Daily, 0           Her

hernandes



     capsule,      00                                 Refill(s)           



     extended                                                        



     release                                                        

 

     Acetaminoph      2019      Yes                      1 tab, PO,           

[facility]



     en 325 MG /      0-25                               Q6H, 0           l



     Hydrocodone      15:07:                               Refill(s)           H

[facility]



     Bitartrate      00                                                



     5 MG Oral                                                        



     Tablet                                                        



     [Norco                                                        



     5/325]                                                        

 

     apixaban 2019      Yes                      5 mg, PO,           [facility]

[facility]



     MG Oral      0-25                               Q12H, 0           l



     Tablet      15:07:                               Refill(s)           Donny martinez



     [Eliquis]      00                                                

 

     metoprolol      2019      Yes                      50 mg = 1           [facility]

[facility]



     tartrate 50      0-25                               tab, PO,           l



     mg oral      15:07:                               BID, # 180           [facility]

daryl



     tablet      00                                 tab, 0           



                                                  Refill(s)           

 

     Diltiazem      2019      Yes                      180 mg = 1           [facility]

[facility]



     Hydrochlori      0-25                               cap, PO,           l



     de       15:07:                               Daily, #           [facility]

daryl



     mg/24 hours      00                                 30 cap, 0           



     oral                                         Refill(s)           



     capsule,                                                        



     extended                                                        



     release                                                        

 

     tramadol      2019      Yes                      150 mg = 1           [facility]

[facility]



     150 mg/24      0-25                               cap, PO,           l



     hours oral      15:07:                               Daily, 0           Her

hernandes



     capsule,      00                                 Refill(s)           



     extended                                                        



     release                                                        

 

     Acetaminoph      2019      Yes                      1 tab, PO,           

[facility]



     en 325 MG /      0-25                               Q6H, 0           l



     Hydrocodone      15:07:                               Refill(s)           H

[facility]



     Bitartrate      00                                                



     5 MG Oral                                                        



     Tablet                                                        



     [Norco                                                        



     5/325]                                                        

 

     apixaban 5      2019      Yes                      5 mg, PO,           Me

[facility]



     MG Oral      0-25                               Q12H, 0           l



     Tablet      15:07:                               Refill(s)           Donny martinez



     [Eliquis]      00                                                

 

     metoprolol      2019      Yes                      50 mg = 1           Me

[facility]



     tartrate 50      0-25                               tab, PO,           l



     mg oral      15:07:                               BID, # 180           [facility]

daryl



     tablet      00                                 tab, 0           



                                                  Refill(s)           

 

     Diltiazem      2019      Yes                      180 mg = 1           [facility]

[facility]



     Hydrochlori      0-25                               cap, PO,           l



     de       15:07:                               Daily, #           [facility]

daryl



     mg/24 hours      00                                 30 cap, 0           



     oral                                         Refill(s)           



     capsule,                                                        



     extended                                                        



     release                                                        

 

     tramadol      2019      Yes                      150 mg = 1           [facility]

[facility]



     150 mg/24      0-25                               cap, PO,           l



     hours oral      15:07:                               Daily, 0           Her

hernandes



     capsule,      00                                 Refill(s)           



     extended                                                        



     release                                                        

 

     Acetaminoph      2019      Yes                      1 tab, PO,           

[facility]



     en 325 MG /      0-25                               Q6H, 0           l



     Hydrocodone      15:07:                               Refill(s)           H

[facility]



     Bitartrate      00                                                



     5 MG Oral                                                        



     Tablet                                                        



     [Norco                                                        



     5/325]                                                        

 

     apixaban 5      2019      Yes                      5 mg, PO,           Me

[facility]



     MG Oral      0-25                               Q12H, 0           l



     Tablet      15:07:                               Refill(s)           Donny

n



     [Eliquis]      00                                                

 

     metoprolol      2019      Yes                      50 mg = 1           [facility]

[facility]



     tartrate 50      0-25                               tab, PO,           l



     mg oral      15:07:                               BID, # 180           [facility]

daryl



     tablet      00                                 tab, 0           



                                                  Refill(s)           

 

     Diltiazem      2019      Yes                      180 mg = 1           [facility]

[facility]



     Hydrochlori      0-25                               cap, PO,           l



     de       15:07:                               Daily, #           [facility]

daryl



     mg/24 hours      00                                 30 cap, 0           



     oral                                         Refill(s)           



     capsule,                                                        



     extended                                                        



     release                                                        

 

     tramadol      2019      Yes                      150 mg = 1           [facility]

[facility]



     150 mg/24      0-25                               cap, PO,           l



     hours oral      15:07:                               Daily, 0           Her

hernandes



     capsule,      00                                 Refill(s)           



     extended                                                        



     release                                                        

 

     Acetaminoph      2019      Yes                      1 tab, PO,           

[facility]



     en 325 MG /      0-25                               Q6H, 0           l



     Hydrocodone      15:07:                               Refill(s)           H

[facility]



     Bitartrate      00                                                



     5 MG Oral                                                        



     Tablet                                                        



     [Norco                                                        



     5/325]                                                        

 

     apixaban 5      2019      Yes                      5 mg, PO,           Me

[facility]



     MG Oral      0-25                               Q12H, 0           l



     Tablet      15:07:                               Refill(s)           Donny martinez



     [Eliquis]      00                                                

 

     metoprolol      2019      Yes                      50 mg = 1           [facility]

[facility]



     tartrate 50      0-25                               tab, PO,           l



     mg oral      15:07:                               BID, # 180           [facility]

d[facility]



     tablet      00                                 tab, 0           



                                                  Refill(s)           

 

     Diltiazem      2019      Yes                      180 mg = 1           Me

[facility]



     Hydrochlori      0-25                               cap, PO,           l



     de       15:07:                               Daily, #           [facility]

d[facility]



     mg/24 hours      00                                 30 cap, 0           



     oral                                         Refill(s)           



     capsule,                                                        



     extended                                                        



     release                                                        

 

     tramadol      2019      Yes                      150 mg = 1           [facility]

[facility]



     150 mg/24      0-25                               cap, PO,           l



     hours oral      15:07:                               Daily, 0           [facility]

[facility]



     capsule,      00                                 Refill(s)           



     extended                                                        



     release                                                        

 

     Acetaminoph      2019      Yes                      1 tab, PO,           

[facility]



     en 325 MG /      0-25                               Q6H, 0           l



     Hydrocodone      15:07:                               Refill(s)           H

[facility]



     Bitartrate      00                                                



     5 MG Oral                                                        



     Tablet                                                        



     [Norco                                                        



     5/325]                                                        

 

     apixaban 5      2019      Yes                      5 mg, PO,           Me

[facility]



     MG Oral      0-25                               Q12H, 0           l



     Tablet      15:07:                               Refill(s)           Donny martinez



     [Eliquis]      00                                                

 

     metoprolol      2019      Yes                      50 mg = 1           Me

[facility]



     tartrate 50      0-25                               tab, PO,           l



     mg oral      15:07:                               BID, # 180           [facility]

d[facility]



     tablet      00                                 tab, 0           



                                                  Refill(s)           

 

     Diltiazem      2019      Yes                      180 mg = 1           Me

[facility]



     Hydrochlori      0-25                               cap, PO,           l



     de       15:07:                               Daily, #           [facility]

d[facility]



     mg/24 hours      00                                 30 cap, 0           



     oral                                         Refill(s)           



     capsule,                                                        



     extended                                                        



     release                                                        

 

     tramadol      2019      Yes                      150 mg = 1           [facility]

[facility]



     150 mg/24      0-25                               cap, PO,           l



     hours oral      15:07:                               Daily, 0           Her

hernandes



     capsule,      00                                 Refill(s)           



     extended                                                        



     release                                                        

 

     Acetaminoph      2019      Yes                      1 tab, PO,           

[facility]



     en 325 MG /      0-25                               Q6H, 0           l



     Hydrocodone      15:07:                               Refill(s)           [facility]

[facility]



     Bitartrate      00                                                



     5 MG Oral                                                        



     Tablet                                                        



     [Norco                                                        



     5/325]                                                        

 

     apixaban 2019      Yes                      5 mg, PO,           Me

[facility]



     MG Oral      0-25                               Q12H, 0           l



     Tablet      15:07:                               Refill(s)           Donny martinez



     [Eliquis]      00                                                

 

     metoprolol      2019      Yes                      50 mg = 1           Me

[facility]



     tartrate 50      0-25                               tab, PO,           l



     mg oral      15:07:                               BID, # 180           [facility]

d[facility]



     tablet      00                                 tab, 0           



                                                  Refill(s)           

 

     Diltiazem      2019      Yes                      180 mg = 1           [facility]

[facility]



     Hydrochlori      0-25                               cap, PO,           l



     de       15:07:                               Daily, #           [facility]

d[facility]



     mg/24 hours      00                                 30 cap, 0           



     oral                                         Refill(s)           



     capsule,                                                        



     extended                                                        



     release                                                        

 

     tramadol      2019      Yes                      150 mg = 1           [facility]

[facility]



     150 mg/24      0-25                               cap, PO,           l



     hours oral      15:07:                               Daily, 0           Her

hernandes



     capsule,      00                                 Refill(s)           



     extended                                                        



     release                                                        

 

     Acetaminoph      2019      Yes                      1 tab, PO,           

[facility]



     en 325 MG /      0-25                               Q6H, 0           l



     Hydrocodone      15:07:                               Refill(s)           H

[facility]



     Bitartrate      00                                                



     5 MG Oral                                                        



     Tablet                                                        



     [Norco                                                        



     5/325]                                                        

 

     apixaban 5      2019      Yes                      5 mg, PO,           Me

[facility]



     MG Oral      0-25                               Q12H, 0           l



     Tablet      15:07:                               Refill(s)           Donny

n



     [Eliquis]      00                                                

 

     metoprolol      2019      Yes                      50 mg = 1           Me

[facility]



     tartrate 50      0-25                               tab, PO,           l



     mg oral      15:07:                               BID, # 180           [facility]

d[facility]



     tablet      00                                 tab, 0           



                                                  Refill(s)           

 

     Diltiazem      2019      Yes                      180 mg = 1           Me

[facility]



     Hydrochlori      0-25                               cap, PO,           l



     de       15:07:                               Daily, #           [facility]

d[facility]



     mg/24 hours      00                                 30 cap, 0           



     oral                                         Refill(s)           



     capsule,                                                        



     extended                                                        



     release                                                        

 

     tramadol      2019      Yes                      150 mg = 1           [facility]

[facility]



     150 mg/24      0-25                               cap, PO,           l



     hours oral      15:07:                               Daily, 0           Her

hernandes



     capsule,      00                                 Refill(s)           



     extended                                                        



     release                                                        

 

     Acetaminoph      2019      Yes                      1 tab, PO,           

[facility]



     en 325 MG /      0-25                               Q6H, 0           l



     Hydrocodone      15:07:                               Refill(s)           H

[facility]



     Bitartrate      00                                                



     5 MG Oral                                                        



     Tablet                                                        



     [Norco                                                        



     5/325]                                                        

 

     calcitriol      2019      Yes                      = 1 cap,           [facility]

[facility]



     0.25 mcg      0-11                               PO, Daily,           l



     oral      21:10:                               # 90           [facility]



     capsule      30                                 unknown           



                                                  unit,           



                                                  Pharmacy:           



                                                  [facility]           



                                                  #[id number]           

 

     calcitriol      2019      Yes                      = 1 cap,           [facility]

[facility]



     0.25 mcg      0-11                               PO, Daily,           l



     oral      21:10:                               # 90           [facility]



     capsule      30                                 unknown           



                                                  unit,           



                                                  Pharmacy:           



                                                  [facility]           



                                                  #[id number]           

 

     calcitriol      2019      Yes                      = 1 cap,           [facility]

[facility]



     0.25 mcg      0-11                               PO, Daily,           l



     oral      21:10:                               # 90           [facility]



     capsule      30                                 unknown           



                                                  unit,           



                                                  Pharmacy:           



                                                  [facility]           



                                                  #[id number]           

 

     calcitriol      2019      Yes                      = 1 cap,           [facility]

[facility]



     0.25 mcg      0-11                               PO, Daily,           l



     oral      21:10:                               # 90           [facility]



     capsule      30                                 unknown           



                                                  unit,           



                                                  Pharmacy:           



                                                  [facility]           



                                                  #[id number]           

 

     calcitriol      2019      Yes                      = 1 cap,           [facility]

[facility]



     0.25 mcg      0-11                               PO, Daily,           l



     oral      21:10:                               # 90           [facility]



     capsule      30                                 unknown           



                                                  unit,           



                                                  Pharmacy:           



                                                  [facility]           



                                                  #[id number]           

 

     calcitriol      2019-      Yes                      = 1 cap,           [facility]

[facility]



     0.25 mcg      0-11                               PO, Daily,           l



     oral      21:10:                               # 90           [facility]



     capsule      30                                 unknown           



                                                  unit,           



                                                  Pharmacy:           



                                                  [facility]           



                                                  [facility]           



                                                  #[id number]           

 

     calcitriol      2019-      Yes                      = 1 cap,           [facility]

[facility]



     0.25 mcg      0-11                               PO, Daily,           l



     oral      21:10:                               # 90           [facility]



     capsule      30                                 unknown           



                                                  unit,           



                                                  Pharmacy:           



                                                  [facility]           



                                                  [facility]           



                                                  #[id number]           

 

     calcitriol      2019-      Yes                      = 1 cap,           [facility]

[facility]



     0.25 mcg      0-11                               PO, Daily,           l



     oral      21:10:                               # 90           [facility]



     capsule      30                                 unknown           



                                                  unit,           



                                                  Pharmacy:           



                                                  [facility]           



                                                  [facility]           



                                                  #[id number]           

 

     gabapentin      2019-0      Yes                      300 mg = 1           M

[facility]



     300 MG Oral      9-27                               cap, PO,           l



     Capsule      20:54:                               BID, # 180           [facility]

daryl



               00                                 cap, 3           



                                                  Refill(s),           



                                                  called to           



                                                  pharmacy           

 

     gabapentin      2019-0      Yes                      300 mg = 1           [facility]

[facility]



     300 MG Oral      9-27                               cap, PO,           l



     Capsule      20:54:                               BID, # 180           [facility]

daryl



               00                                 cap, 3           



                                                  Refill(s),           



                                                  called to           



                                                  pharmacy           

 

     gabapentin      2019-0      Yes                      300 mg = 1           [facility]

[facility]



     300 MG Oral      9-27                               cap, PO,           l



     Capsule      20:54:                               BID, # 180           [facility]

daryl



               00                                 cap, 3           



                                                  Refill(s),           



                                                  called to           



                                                  pharmacy           

 

     gabapentin      2019-0      Yes                      300 mg = 1           [facility]

[facility]



     300 MG Oral      9-27                               cap, PO,           l



     Capsule      20:54:                               BID, # 180           [facility]

d[facility]



               00                                 cap, 3           



                                                  Refill(s),           



                                                  called to           



                                                  pharmacy           

 

     gabapentin      2019-0      Yes                      300 mg = 1           [facility]

[facility]



     300 MG Oral      9-27                               cap, PO,           l



     Capsule      20:54:                               BID, # 180           [facility]

d[facility]



               00                                 cap, 3           



                                                  Refill(s),           



                                                  called to           



                                                  pharmacy           

 

     gabapentin      2019-0      Yes                      300 mg = 1           [facility]

[facility]



     300 MG Oral      9-27                               cap, PO,           l



     Capsule      20:54:                               BID, # 180           [facility]

d[facility]



               00                                 cap, 3           



                                                  Refill(s),           



                                                  called to           



                                                  pharmacy           

 

     gabapentin      2019-0      Yes                      300 mg = 1           [facility]

[facility]



     300 MG Oral      9-27                               cap, PO,           l



     Capsule      20:54:                               BID, # 180           [facility]

d[facility]



               00                                 cap, 3           



                                                  Refill(s),           



                                                  called to           



                                                  pharmacy           

 

     gabapentin      2019-0      Yes                      300 mg = 1           [facility]

[facility]



     300 MG Oral      9-27                               cap, PO,           l



     Capsule      20:54:                               BID, # 180           [facility]

daryl



               00                                 cap, 3           



                                                  Refill(s),           



                                                  called to           



                                                  pharmacy           

 

     allopurinol      2019-0      Yes                      = 1 tab,           [facility]

[facility]



     300 mg oral      8-17                               PO, Daily,           l



     tablet      02:39:                               # 90 tabDonny

n



                                                Pharmacy:           



                                                  [facility]           



                                                  [facility]           



                                                  #[id number]           

 

     allopurinol      2019-0      Yes                      = 1 tab,           [facility]

[facility]



     300 mg oral      8-17                               PO, Daily,           l



     tablet      02:39:                               # 90 tab,           Donny

[facility]                                 Pharmacy:           



                                                  [facility]           



                                                  [facility]           



                                                  #[id number]           

 

     allopurinol      2019-0      Yes                      = 1 tab,           [facility]

[facility]



     300 mg oral      8-17                               PO, Daily,           l



     tablet      02:39:                               # 90 tab,           Donny

[facility]                                 Pharmacy:           



                                                  [facility]           



                                                  [facility]           



                                                  #[id number]           

 

     allopurinol      2019-0      Yes                      = 1 tab,           [facility]

[facility]



     300 mg oral      8-17                               PO, Daily,           l



     tablet      02:39:                               # 90 tab,           Donny

[facility]                                 Pharmacy:           



                                                  [facility]           



                                                  [facility]           



                                                  #[id number]           

 

     allopurinol      2019-0      Yes                      = 1 tab,           Me

[facility]



     300 mg oral      8-17                               PO, Daily,           l



     tablet      02:39:                               # 90 tab,           Donny

n



                                                Pharmacy:           



                                                  [facility]           



                                                  [facility]           



                                                  #[id number]           

 

     allopurinol      2019-0      Yes                      = 1 tab,           Me

[facility]



     300 mg oral      8-17                               PO, Daily,           l



     tablet      02:39:                               # 90 tab,           Donny

n



                                                Pharmacy:           



                                                  [facility]           



                                                  [facility]           



                                                  #[id number]           

 

     allopurinol      2019-0      Yes                      = 1 tab,           Me

[facility]



     300 mg oral      8-17                               PO, Daily,           l



     tablet      02:39:                               # 90 tab,           Donny

n



                                                Pharmacy:           



                                                  [facility]           



                                                  [facility]           



                                                  #[id number]           

 

     allopurinol      2019-0      Yes                      = 1 tab,           Me

[facility]



     300 mg oral      8-17                               PO, Daily,           l



     tablet      02:39:                               # 90 tab,           Donny

n



                                                Pharmacy:           



                                                  [facility]           



                                                  [facility]           



                                                  #[id number]           

 

     QUEtiapine      2019-0      Yes                      50 mg = 1           Me

[facility]



     50 mg oral      6-06                               tab, PO,           l



     tablet      15:28:                               Bedtime, #           [facility]

nn



               00                                 30 tab, 1           



                                                  Refill(s)           

 

     QUEtiapine      2019-0      Yes                      50 mg = 1           Me

[facility]



     50 mg oral      6-06                               tab, PO,           l



     tablet      15:28:                               Bedtime, #           [facility]

[facility]



               00                                 30 tab, 1           



                                                  Refill(s)           

 

     QUEtiapine      2019-0      Yes                      50 mg = 1           Me

[facility]



     50 mg oral      6-06                               tab, PO,           l



     tablet      15:28:                               Bedtime, #           [facility]

[facility]



               00                                 30 tab, 1           



                                                  Refill(s)           

 

     QUEtiapine      2019-0      Yes                      50 mg = 1           Me

[facility]



     50 mg oral      6-06                               tab, PO,           l



     tablet      15:28:                               Bedtime, #           [facility]

[facility]



               00                                 30 tab, 1           



                                                  Refill(s)           

 

     QUEtiapine      2019-0      Yes                      50 mg = 1           Me

[facility]



     50 mg oral      6-06                               tab, PO,           l



     tablet      15:28:                               Bedtime, #           [facility]

[facility]



               00                                 30 tab, 1           



                                                  Refill(s)           

 

     QUEtiapine      2019-0      Yes                      50 mg = 1           Me

[facility]



     50 mg oral      6-06                               tab, PO,           l



     tablet      15:28:                               Bedtime, #           [facility]

[facility]



               00                                 30 tab, 1           



                                                  Refill(s)           

 

     QUEtiapine      2019-0      Yes                      50 mg = 1           Me

[facility]



     50 mg oral      6-06                               tab, PO,           l



     tablet      15:28:                               Bedtime, #           [facility]

[facility]



               00                                 30 tab, 1           



                                                  Refill(s)           

 

     QUEtiapine      2019-0      Yes                      50 mg = 1           Me

[facility]



     50 mg oral      6-06                               tab, PO,           l



     tablet      15:28:                               Bedtime, #           [facility]

[facility]



               00                                 30 tab, 1           



                                                  Refill(s)           

 

     eletriptan      2019-0      Yes                      40 mg = 1           Me

[facility]



     40 mg oral      6-06                               tab, PO,           l



     tablet      15:26:                               Daily, PRN           [facility]

[facility]



               00                                 for            



                                                  migraine           



                                                  headache,           



                                                  may repeat           



                                                  dose once           



                                                  in 2           



                                                  hours, # 6           



                                                  tab, 0           



                                                  Refill(s)           

 

     eletriptan      2019-0      Yes                      40 mg = 1           Me

[facility]



     40 mg oral      6-06                               tab, PO,           l



     tablet      15:26:                               Daily, PRN           [facility]

[facility]



               00                                 for            



                                                  migraine           



                                                  headache,           



                                                  may repeat           



                                                  dose once           



                                                  in 2           



                                                  hours, # 6           



                                                  tab, 0           



                                                  Refill(s)           

 

     eletriptan      2019-0      Yes                      40 mg = 1           Me

[facility]



     40 mg oral      6-06                               tab, PO,           l



     tablet      15:26:                               Daily, PRN           [facility]

[facility]



               00                                 for            



                                                  migraine           



                                                  headache,           



                                                  may repeat           



                                                  dose once           



                                                  in 2           



                                                  hours, # 6           



                                                  tab, 0           



                                                  Refill(s)           

 

     eletriptan      2019-0      Yes                      40 mg = 1           Me

[facility]



     40 mg oral      6-06                               tab, PO,           l



     tablet      15:26:                               Daily, PRN           [facility]

[facility]



               00                                 for            



                                                  migraine           



                                                  headache,           



                                                  may repeat           



                                                  dose once           



                                                  in 2           



                                                  hours, # 6           



                                                  tab, 0           



                                                  Refill(s)           

 

     eletriptan            Yes                      40 mg = 1           Me

[facility]



     40 mg oral      6-06                               tab, PO,           l



     tablet      15:26:                               Daily, PRN           [facility]

[facility]



               00                                 for            



                                                  migraine           



                                                  headache,           



                                                  may repeat           



                                                  dose once           



                                                  in 2           



                                                  hours, # 6           



                                                  tab, 0           



                                                  Refill(s)           

 

     eletriptan            Yes                      40 mg = 1           Me

[facility]



     40 mg oral      6-06                               tab, PO,           l



     tablet      15:26:                               Daily, PRN           [facility]

[facility]



               00                                 for            



                                                  migraine           



                                                  headache,           



                                                  may repeat           



                                                  dose once           



                                                  in 2           



                                                  hours, # 6           



                                                  tab, 0           



                                                  Refill(s)           

 

     eletriptan            Yes                      40 mg = 1           Me

[facility]



     40 mg oral      6-06                               tab, PO,           l



     tablet      15:26:                               Daily, PRN           [facility]

[facility]



               00                                 for            



                                                  migraine           



                                                  headache,           



                                                  may repeat           



                                                  dose once           



                                                  in 2           



                                                  hours, # 6           



                                                  tab, 0           



                                                  Refill(s)           

 

     eletriptan            Yes                      40 mg = 1           Me

[facility]



     40 mg oral      6-06                               tab, PO,           l



     tablet      15:26:                               Daily, PRN           [facility]

[facility]



               00                                 for            



                                                  migraine           



                                                  headache,           



                                                  may repeat           



                                                  dose once           



                                                  in 2           



                                                  hours, # 6           



                                                  tab, 0           



                                                  Refill(s)           

 

     atorvastati            Yes                      See            [facility]

a



     n 40 mg      3-14                               [facility]           l



     oral tablet      15:07:                               ns, TAKE 1           

[facility]



               35                                 TABLET BY           



                                                  MOUTH           



                                                  EVERY           



                                                  NIGHT AT           



                                                  BEDTIME, #           



                                                  90 tab, 1           



                                                  Refill(s),           



                                                  Pharmacy:           



                                                  [facility]           



                                                  [facility]           



                                                  [facility]           

 

     atorvastati            Yes                      See            [facility]

a



     n 40 mg      3-14                               In[facility]           l



     oral tablet      15:07:                               ns, TAKE 1           

[facility]



               35                                 TABLET BY           



                                                  MOUTH           



                                                  EVERY           



                                                  NIGHT AT           



                                                  BEDTIME, #           



                                                  90 tab, 1           



                                                  Refill(s),           



                                                  Pharmacy:           



                                                  [facility]           



                                                  [facility]           



                                                  [facility]           

 

     atorvastati            Yes                      See            [facility]

a



     n 40 mg      3-14                               [facility]           l



     oral tablet      15:07:                               ns, TAKE 1           

[facility]



               35                                 TABLET BY           



                                                  MOUTH           



                                                  EVERY           



                                                  NIGHT AT           



                                                  BEDTIME, #           



                                                  90 tab, 1           



                                                  Refill(s),           



                                                  Pharmacy:           



                                                  [facility]           



                                                  [facility]           



                                                  [facility]           

 

     atorvastati            Yes                      See            [facility]

a



     n 40 mg      3-14                               [facility]           l



     oral tablet      15:07:                               ns, TAKE 1           

[facility]



               35                                 TABLET BY           



                                                  MOUTH           



                                                  EVERY           



                                                  NIGHT AT           



                                                  BEDTIME, #           



                                                  90 tab, 1           



                                                  Refill(s),           



                                                  Pharmacy:           



                                                  [address]            Yes                      See            [facility]

a



     n 40 mg      3-14                               Instructio           l



     oral tablet      15:07:                               ns, TAKE 1           

[facility]



               35                                 TABLET BY           



                                                  MOUTH           



                                                  EVERY           



                                                  NIGHT AT           



                                                  BEDTIME, #           



                                                  90 tab, 1           



                                                  Refill(s),           



                                                  Pharmacy:           



                                                  [address]            Yes                      See            [facility]

a



     n 40 mg      3-14                               In[facility]           l



     oral tablet      15:07:                               ns, TAKE 1           

[facility]



               35                                 TABLET BY           



                                                  MOUTH           



                                                  EVERY           



                                                  NIGHT AT           



                                                  BEDTIME, #           



                                                  90 tab, 1           



                                                  Refill(s),           



                                                  Pharmacy:           



                                                  [address]            Yes                      See            [facility]

a



     n 40 mg      3-14                               [facility]           l



     oral tablet      15:07:                               ns, TAKE 1           

[facility]



               35                                 TABLET BY           



                                                  MOUTH           



                                                  EVERY           



                                                  NIGHT AT           



                                                  BEDTIME, #           



                                                  90 tab, 1           



                                                  Refill(s),           



                                                  Pharmacy:           



                                                  [address]            Yes                      See            [facility]

a



     n 40 mg      3-14                               [facility]           l



     oral tablet      15:07:                               ns, TAKE 1           

[facility]



               35                                 TABLET BY           



                                                  MOUTH           



                                                  EVERY           



                                                  NIGHT AT           



                                                  BEDTIME, #           



                                                  90 tab, 1           



                                                  Refill(s),           



                                                  Pharmacy:           



                                                  [place] [id number]           

 

     amLODIPine            Yes                      See            [facility]



     5 mg oral      3-14                               In[facility]           l



     tablet      15:07:                               ns, TAKE 1           [facility]

nn



               33                                 TABLET BY           



                                                  MOUTH           



                                                  EVERY DAY,           



                                                  # 90 tab,           



                                                  1              



                                                  Refill(s),           



                                                  Pharmacy:           



                                                  [place] [address]           

 

     amLODIPine            Yes                      See            [facility]



     5 mg oral      3-14                               [facility]           l



     tablet      15:07:                               ns, TAKE 1           [facility]

[facility]



               33                                 TABLET BY           



                                                  MOUTH           



                                                  EVERY DAY,           



                                                  # 90 tab,           



                                                  1              



                                                  Refill(s),           



                                                  Pharmacy:           



                                                  [place] [address]           

 

     amLODIPine            Yes                      See            [facility]



     5 mg oral      3-14                               [facility]           l



     tablet      15:07:                               ns, TAKE 1           [facility]

[facility]



               33                                 TABLET BY           



                                                  MOUTH           



                                                  EVERY DAY,           



                                                  # 90 tab,           



                                                  1              



                                                  Refill(s),           



                                                  Pharmacy:           



                                                  [place] [address]           

 

     amLODIPine            Yes                      See            [facility]



     5 mg oral      3-14                               Instruct[facility]           l



     tablet      15:07:                               ns, TAKE 1           [facility]

[facility]



               33                                 TABLET BY           



                                                  MOUTH           



                                                  EVERY DAY,           



                                                  # 90 tab,           



                                                  1              



                                                  Refill(s),           



                                                  Pharmacy:           



                                                  [facility]           



                                                  [facility]           



                                                  [id number]           

 

     amLODIPine            Yes                      See            [facility]



     5 mg oral      3-14                               Instruct[facility]           l



     tablet      15:07:                               ns, TAKE 1           [facility]

[facility]



               33                                 TABLET BY           



                                                  MOUTH           



                                                  EVERY DAY,           



                                                  # 90 tab,           



                                                  1              



                                                  Refill(s),           



                                                  Pharmacy:           



                                                  [facility]           



                                                  [facility]           



                                                  [id number]           

 

     amLODIPine            Yes                      See            [facility]



     5 mg oral      3-14                               [facility]           l



     tablet      15:07:                               ns, TAKE 1           [facility]

[facility]



               33                                 TABLET BY           



                                                  MOUTH           



                                                  EVERY DAY,           



                                                  # 90 tab,           



                                                  1              



                                                  Refill(s),           



                                                  Pharmacy:           



                                                  [facility]           



                                                  [facility]           



                                                  [id number]           

 

     amLODIPine            Yes                      See            [facility]



     5 mg oral      3-14                               [facility]           l



     tablet      15:07:                               ns, TAKE 1           [facility]

[facility]



               33                                 TABLET BY           



                                                  MOUTH           



                                                  EVERY DAY,           



                                                  # 90 tab,           



                                                  1              



                                                  Refill(s),           



                                                  Pharmacy:           



                                                  [facility]           



                                                  [facility]           



                                                  [id number]           

 

     amLODIPine            Yes                      See            [facility]



     5 mg oral      3-14                               Instructio           l



     tablet      15:07:                               ns, TAKE 1           [facility]

nn



               33                                 TABLET BY           



                                                  MOUTH           



                                                  EVERY DAY,           



                                                  # 90 tab,           



                                                  1              



                                                  Refill(s),           



                                                  Pharmacy:           



                                                  [facility]           



                                                  [facility]           



                                                  [id number]           

 

     lisinopril            Yes                      See            [facility]



     5 mg oral      8-21                               Instructio           l



     tablet      19:00:                               ns, TAKE 1           [facility]

nn



               30                                 TABLET BY           



                                                  MOUTH           



                                                  DAILY, #           



                                                  90 tab, 1           



                                                  Refill(s),           



                                                  Pharmacy:           



                                                  [facility]           



                                                  [facility]           



                                                  [id number]           

 

     lisinopril            Yes                      See            [facility]



     5 mg oral      8-21                               Instructio           l



     tablet      19:00:                               ns, TAKE 1           [facility]

[facility]



               30                                 TABLET BY           



                                                  MOUTH           



                                                  DAILY, #           



                                                  90 tab, 1           



                                                  Refill(s),           



                                                  Pharmacy:           



                                                  [facility]           



                                                  [facility]           



                                                  [facility]           

 

     lisinopril      -      Yes                      See            [facility]



     5 mg oral      8-21                               Instructio           l



     tablet      19:00:                               ns, TAKE 1           [facility]

[facility]



               30                                 TABLET BY           



                                                  MOUTH           



                                                  DAILY, #           



                                                  90 tab, 1           



                                                  Refill(s),           



                                                  Pharmacy:           



                                                  [facility]           



                                                  [facility]           



                                                  [id number]           

 

     lisinopril      -      Yes                      See            [facility]



     5 mg oral      8-21                               Instructio           l



     tablet      19:00:                               ns, TAKE 1           [facility]

[facility]



               30                                 TABLET BY           



                                                  MOUTH           



                                                  DAILY, #           



                                                  90 tab, 1           



                                                  Refill(s),           



                                                  Pharmacy:           



                                                  [place] [id number]           

 

     lisinopril            Yes                      See            [facility]



     5 mg oral      8-21                               Instructio           l



     tablet      19:00:                               ns, TAKE 1           [facility]

[facility]



               30                                 TABLET BY           



                                                  MOUTH           



                                                  DAILY, #           



                                                  90 tab, 1           



                                                  Refill(s),           



                                                  Pharmacy:           



                                                  [place] [id number]           

 

     lisinopril            Yes                      See            [facility]



     5 mg oral      8-21                               Instructio           l



     tablet      19:00:                               ns, TAKE 1           [facility]

[facility]



               30                                 TABLET BY           



                                                  MOUTH           



                                                  DAILY, #           



                                                  90 tab, 1           



                                                  Refill(s),           



                                                  Pharmacy:           



                                                  [place] [id number]           

 

     lisinopril            Yes                      See            [facility]



     5 mg oral      8-21                               Instructio           l



     tablet      19:00:                               ns, TAKE 1           [facility]

[facility]



               30                                 TABLET BY           



                                                  MOUTH           



                                                  DAILY, #           



                                                  90 tab, 1           



                                                  Refill(s),           



                                                  Pharmacy:           



                                                  [place] [id number]           

 

     lisinopril            Yes                      See            [facility]



     5 mg oral      8-21                               Instructio           l



     tablet      19:00:                               ns, TAKE 1           [facility]

[facility]



               30                                 TABLET BY           



                                                  MOUTH           



                                                  DAILY, #           



                                                  90 tab, 1           



                                                  Refill(s),           



                                                  Pharmacy:           



                                                  [place] [id number]           

 

     atorvastati            Yes                      See            [facility]

a



     n 40 mg      8-21                               Instructio           l



     oral tablet      18:50:                               ns, TAKE 1           

[facility]



               45                                 TABLET BY           



                                                  MOUTH           



                                                  EVERY           



                                                  NIGHT AT           



                                                  BEDTIME, #           



                                                  30 tab, 5           



                                                  Refill(s),           



                                                  Pharmacy:           



                                                  [place] [id number]           

 

     atorvastati            Yes                      See            [facility]

a



     n 40 mg      8-21                               Instructio           l



     oral tablet      18:50:                               ns, TAKE 1           

[facility]



               45                                 TABLET BY           



                                                  MOUTH           



                                                  EVERY           



                                                  NIGHT AT           



                                                  BEDTIME, #           



                                                  30 tab, 5           



                                                  Refill(s),           



                                                  Pharmacy:           



                                                  [facility]           



                                                  [facility]           



                                                  [id number]           

 

     atorvastati            Yes                      See            [facility]

a



     n 40 mg      8-21                               Instructio           l



     oral tablet      18:50:                               ns, TAKE 1           

[facility]



               45                                 TABLET BY           



                                                  MOUTH           



                                                  EVERY           



                                                  NIGHT AT           



                                                  BEDTIME, #           



                                                  30 tab, 5           



                                                  Refill(s),           



                                                  Pharmacy:           



                                                  [facility]           



                                                  [facility]           



                                                  [id number]           

 

     atorvasta            Yes                      See            [facility]

a



     n 40 mg      8-21                               Instructio           l



     oral tablet      18:50:                               ns, TAKE 1           

[facility]



               45                                 TABLET BY           



                                                  MOUTH           



                                                  EVERY           



                                                  NIGHT AT           



                                                  BEDTIME, #           



                                                  30 tab, 5           



                                                  Refill(s),           



                                                  Pharmacy:           



                                                  [facility]           



                                                  [facility]           



                                                  [id number]           

 

     atorvastati            Yes                      See            [facility]

a



     n 40 mg      8-21                               Instructio           l



     oral tablet      18:50:                               ns, TAKE 1           

[facility]



               45                                 TABLET BY           



                                                  MOUTH           



                                                  EVERY           



                                                  NIGHT AT           



                                                  BEDTIME, #           



                                                  30 tab, 5           



                                                  Refill(s),           



                                                  Pharmacy:           



                                                  [place] [id number]           

 

     atorvastati            Yes                      See            [facility]

a



     n 40 mg      8-21                               Instructio           l



     oral tablet      18:50:                               ns, TAKE 1           

[facility]



               45                                 TABLET BY           



                                                  MOUTH           



                                                  EVERY           



                                                  NIGHT AT           



                                                  BEDTIME, #           



                                                  30 tab, 5           



                                                  Refill(s),           



                                                  Pharmacy:           



                                                  [place] [id number]           

 

     atorvastati            Yes                      See            [facility]

a



     n 40 mg      8-21                               Instructio           l



     oral tablet      18:50:                               ns, TAKE 1           

[facility]



               45                                 TABLET BY           



                                                  MOUTH           



                                                  EVERY           



                                                  NIGHT AT           



                                                  BEDTIME, #           



                                                  30 tab, 5           



                                                  Refill(s),           



                                                  Pharmacy:           



                                                  [place] [id number]           

 

     atorvastati            Yes                      See            [facility]

a



     n 40 mg      8-21                               Instructio           l



     oral tablet      18:50:                               ns, TAKE 1           

[facility]



               45                                 TABLET BY           



                                                  MOUTH           



                                                  EVERY           



                                                  NIGHT AT           



                                                  BEDTIME, #           



                                                  30 tab, 5           



                                                  Refill(s),           



                                                  Pharmacy:           



                                                  [place] [id number]           

 

     amLODIPine            Yes                      See            [facility]



     5 mg oral      8-21                               Instructio           l



     tablet      18:50:                               ns, TAKE 1           [facility]

nn



               41                                 TABLET BY           



                                                  MOUTH           



                                                  EVERY DAY,           



                                                  # 30 tab,           



                                                  5              



                                                  Refill(s),           



                                                  Pharmacy:           



                                                  [place] [id number]           

 

     amLODIPine            Yes                      See            [facility]



     5 mg oral      8-21                               Instructio           l



     tablet      18:50:                               ns, TAKE 1           [facility]

[facility]



               41                                 TABLET BY           



                                                  MOUTH           



                                                  EVERY DAY,           



                                                  # 30 tab,           



                                                  5              



                                                  Refill(s),           



                                                  Pharmacy:           



                                                  [place] [id number]           

 

     amLODIPine            Yes                      See            [facility]



     5 mg oral      8-21                               Instructio           l



     tablet      18:50:                               ns, TAKE 1           [facility]

[facility]



               41                                 TABLET BY           



                                                  MOUTH           



                                                  EVERY DAY,           



                                                  # 30 tab,           



                                                  5              



                                                  Refill(s),           



                                                  Pharmacy:           



                                                  [place] [id number]           

 

     amLODIPine            Yes                      See            [facility]



     5 mg oral      8-21                               Instructio           l



     tablet      18:50:                               ns, TAKE 1           [facility]

[facility]



               41                                 TABLET BY           



                                                  MOUTH           



                                                  EVERY DAY,           



                                                  # 30 tab,           



                                                  5              



                                                  Refill(s),           



                                                  Pharmacy:           



                                                  [place] [id number]           

 

     amLODIPine            Yes                      See            [facility]



     5 mg oral      8-21                               Instructio           l



     tablet      18:50:                               ns, TAKE 1           [facility]

[facility]



               41                                 TABLET BY           



                                                  MOUTH           



                                                  EVERY DAY,           



                                                  # 30 tab,           



                                                  5              



                                                  Refill(s),           



                                                  Pharmacy:           



                                                  [facility]           



                                                  [facility]           



                                                  [id number]           

 

     amLODIPine            Yes                      See            [facility]



     5 mg oral      8-21                               Instructio           l



     tablet      18:50:                               ns, TAKE 1           [facility]

[facility]



               41                                 TABLET BY           



                                                  MOUTH           



                                                  EVERY DAY,           



                                                  # 30 tab,           



                                                  5              



                                                  Refill(s),           



                                                  Pharmacy:           



                                                  [facility]           



                                                  [facility]           



                                                  [id number]           

 

     amLODIPine            Yes                      See            [facility]



     5 mg oral      8-21                               Instructio           l



     tablet      18:50:                               ns, TAKE 1           [facility]

[facility]



               41                                 TABLET BY           



                                                  MOUTH           



                                                  EVERY DAY,           



                                                  # 30 tab,           



                                                  5              



                                                  Refill(s),           



                                                  Pharmacy:           



                                                  [facility]           



                                                  [facility]           



                                                  [id number]           

 

     amLODIPine            Yes                      See            [facility]



     5 mg oral      8-21                               Instructio           l



     tablet      18:50:                               ns, TAKE 1           [facility]

[facility]



               41                                 TABLET BY           



                                                  MOUTH           



                                                  EVERY DAY,           



                                                  # 30 tab,           



                                                  5              



                                                  Refill(s),           



                                                  Pharmacy:           



                                                  [facility]           



                                                  [facility]           



                                                  [id number]           

 

     lisinopril            No                       See            [facility]



     5 mg oral      7-31                               Instructio           l



     tablet      15:28:                               ns, # 90           [facility]



               34                                 tab, TAKE           



                                                  1 TABLET           



                                                  BY MOUTH           



                                                  DAILY,           



                                                  Pharmacy:           



                                                  [facility]           



                                                  [facility]           



                                                  [id number]           

 

     lisinopril            No                       See            [facility]



     5 mg oral      7-31                               Instructio           l



     tablet      15:28:                               ns, # 90           [facility]



               34                                 tab, TAKE           



                                                  1 TABLET           



                                                  BY MOUTH           



                                                  DAILY,           



                                                  Pharmacy:           



                                                  [facility]           



                                                  [facility]           



                                                  [id number]           

 

     lisinopril            No                       See            [facility]



     5 mg oral      7-31                               Instructio           l



     tablet      15:28:                               ns, # 90           [facility]



               34                                 tab, TAKE           



                                                  1 TABLET           



                                                  BY MOUTH           



                                                  DAILY,           



                                                  Pharmacy:           



                                                  [facility]           



                                                  [facility]           



                                                  [id number]           

 

     lisinopril            No                       See            [facility]



     5 mg oral      7-31                               Instructio           l



     tablet      15:28:                               ns, # 90           [facility]



               34                                 tab, TAKE           



                                                  1 TABLET           



                                                  BY MOUTH           



                                                  DAILY,           



                                                  Pharmacy:           



                                                  [facility]           



                                                  [facility]           



                                                  [id number]           

 

     lisinopril            No                       See            [facility]



     5 mg oral      7-31                               Instructio           l



     tablet      15:28:                               ns, # 90           [facility]



               34                                 tab, TAKE           



                                                  1 TABLET           



                                                  BY MOUTH           



                                                  DAILY,           



                                                  Pharmacy:           



                                                  [facility]           



                                                  [facility]           



                                                  [id number]           

 

     lisinopril            No                       See            [facility]



     5 mg oral      7-31                               Instructio           l



     tablet      15:28:                               ns, # 90           [facility]



               34                                 tab, TAKE           



                                                  1 TABLET           



                                                  BY MOUTH           



                                                  DAILY,           



                                                  Pharmacy:           



                                                  [facility]           



                                                  [facility]           



                                                  [id number]           

 

     lisinopril      2018-0      No                       See            [facility]



     5 mg oral      7-31                               Instructio           l



     tablet      15:28:                               ns, # 90           [facility]



               34                                 tab, TAKE           



                                                  1 TABLET           



                                                  BY MOUTH           



                                                  DAILY,           



                                                  Pharmacy:           



                                                  [facility]           



                                                  [facility]           



                                                  [id number]           

 

     lisinopril      2018-0      No                       See            [facility]



     5 mg oral      7-31                               Instructio           l



     tablet      15:28:                               ns, # 90           [facility]



               34                                 tab, TAKE           



                                                  1 TABLET           



                                                  BY MOUTH           



                                                  DAILY,           



                                                  Pharmacy:           



                                                  [facility]           



                                                  [facility] [facility]           



                                                  [id number]           

 

     allopurinol      -0      No                       300 mg = 1           

[facility]



     300 mg oral      5-10                               tab, PO,           l



     tablet      13:59:                               Daily, #           [facility]



               00                                 90 tab, 1           



                                                  Refill(s),           



                                                  Pharmacy:           



                                                  [facility]           



                                                  [facility]           



                                                  [id number]           

 

     allopurinol      -0      No                       300 mg = 1           

[facility]



     300 mg oral      5-10                               tab, PO,           l



     tablet      13:59:                               Daily, #           [facility]



               00                                 90 tab, 1           



                                                  Refill(s),           



                                                  Pharmacy:           



                                                  [facility]           



                                                  [facility]           



                                                  [id number]           

 

     allopurinol      -0      No                       300 mg = 1           

[facility]



     300 mg oral      5-10                               tab, PO,           l



     tablet      13:59:                               Daily, #           [facility]



               00                                 90 tab, 1           



                                                  Refill(s),           



                                                  Pharmacy:           



                                                  [facility]           



                                                  [facility]           



                                                  [id number]           

 

     allopurinol      -0      No                       300 mg = 1           

[facility]



     300 mg oral      5-10                               tab, PO,           l



     tablet      13:59:                               Daily, #           [facility]



               00                                 90 tab, 1           



                                                  Refill(s),           



                                                  Pharmacy:           



                                                  [facility]           



                                                  [facility]           



                                                  [id number]           

 

     allopurinol      2018-0      No                       300 mg = 1           

[facility]



     300 mg oral      5-10                               tab, PO,           l



     tablet      13:59:                               Daily, #           [facility]



               00                                 90 tab, 1           



                                                  Refill(s),           



                                                  Pharmacy:           



                                                  [facility]           



                                                  [facility]           



                                                  [id number]           

 

     allopurinol      2018-0      No                       300 mg = 1           

[facility]



     300 mg oral      5-10                               tab, PO,           l



     tablet      13:59:                               Daily, #           [facility]



               00                                 90 tab, 1           



                                                  Refill(s),           



                                                  Pharmacy:           



                                                  [facility]           



                                                  [facility]           



                                                  [id number]           

 

     allopurinol      2018-0      No                       300 mg = 1           

[facility]



     300 mg oral      5-10                               tab, PO,           l



     tablet      13:59:                               Daily, #           [facility]



               00                                 90 tab, 1           



                                                  Refill(s),           



                                                  Pharmacy:           



                                                  [place] [id number]           

 

     allopurinol            No                       300 mg = 1           

[facility]



     300 mg oral      5-10                               tab, PO,           l



     tablet      13:59:                               Daily, #           [facility]



               00                                 90 tab, 1           



                                                  Refill(s),           



                                                  Pharmacy:           



                                                  [place] [address]           

 

     lisinopril            No                       See            [facility]



     5 mg oral      5-01                               Instructio           l



     tablet      12:38:                               ns, # 90           [facility]



               18                                 tab, TAKE           



                                                  1 TABLET           



                                                  BY MOUTH           



                                                  DAILY,           



                                                  Pharmacy:           



                                                  [facility]           



                                                  [facility]           



                                                  [id number]           

 

     ALLOPURINOL            Yes                      See            [facility]

a



     300MG      5-01                               Instructio           l



     TABLETS      12:38:                               ns, # 90           [facility]

n



               18                                 tab, TAKE           



                                                  1 TABLET           



                                                  BY MOUTH           



                                                  DAILY,           



                                                  Pharmacy:           



                                                  [facility]           



                                                  [facility]           



                                                  [id number]           

 

     lisinopril            No                       See            [facility]



     5 mg oral      5-01                               Instructio           l



     tablet      12:38:                               ns, # 90           [facility]



               18                                 tab, TAKE           



                                                  1 TABLET           



                                                  BY MOUTH           



                                                  DAILY,           



                                                  Pharmacy:           



                                                  [facility]           



                                                  [facility]           



                                                  [id number]           

 

     ALLOPURINOL            Yes                      See            [facility]

a



     300MG      5-01                               Instructio           l



     TABLETS      12:38:                               ns, # 90           [facility]

n



               18                                 tab, TAKE           



                                                  1 TABLET           



                                                  BY MOUTH           



                                                  DAILY,           



                                                  Pharmacy:           



                                                  [facility]           



                                                  [facility]           



                                                  [id number]           

 

     lisinopril            No                       See            [facility]



     5 mg oral      5-01                               Instructio           l



     tablet      12:38:                               ns, # 90           [facility]



               18                                 tab, TAKE           



                                                  1 TABLET           



                                                  BY MOUTH           



                                                  DAILY,           



                                                  Pharmacy:           



                                                  [facility]           



                                                  [facility]           



                                                  [id number]           

 

     ALLOPURINOL            Yes                      See            [facility]

a



     300MG      5-01                               Instructio           l



     TABLETS      12:38:                               ns, # 90           [facility]

n



               18                                 tab, TAKE           



                                                  1 TABLET           



                                                  BY MOUTH           



                                                  DAILY,           



                                                  Pharmacy:           



                                                  [facility]           



                                                  [facility]           



                                                  [id number]           

 

     lisinopril            No                       See            [facility]



     5 mg oral      5-01                               Instructio           l



     tablet      12:38:                               ns, # 90           [facility]



               18                                 tab, TAKE           



                                                  1 TABLET           



                                                  BY MOUTH           



                                                  DAILY,           



                                                  Pharmacy:           



                                                  [facility]           



                                                  [facility]           



                                                  [id number]           

 

     ALLOPURINOL      0      Yes                      See            [facility]

a



     300MG      5-01                               Instructio           l



     TABLETS      12:38:                               ns, # 90           [facility]

n



               18                                 tab, TAKE           



                                                  1 TABLET           



                                                  BY MOUTH           



                                                  DAILY,           



                                                  Pharmacy:           



                                                  [facility]           



                                                  [facility]           



                                                  [id number]           

 

     lisinopril      0      No                       See            [facility]



     5 mg oral      5-01                               Instructio           l



     tablet      12:38:                               ns, # 90           [facility]



               18                                 tab, TAKE           



                                                  1 TABLET           



                                                  BY MOUTH           



                                                  DAILY,           



                                                  Pharmacy:           



                                                  [place] [address]           

 

     ALLOPURINOL            Yes                      See            [facility]

a



     300MG      5-01                               Instructio           l



     TABLETS      12:38:                               ns, # 90           [facility]

n



               18                                 tab, TAKE           



                                                  1 TABLET           



                                                  BY MOUTH           



                                                  DAILY,           



                                                  Pharmacy:           



                                                  [place] [address]           

 

     lisinopril      2018      No                       See            [facility]



     5 mg oral      5-01                               Instructio           l



     tablet      12:38:                               ns, # 90           [facility]



               18                                 tab, TAKE           



                                                  1 TABLET           



                                                  BY MOUTH           



                                                  DAILY,           



                                                  Pharmacy:           



                                                  [facility]           



                                                  [facility]           



                                                  [id number]           

 

     ALLOPURINOL            Yes                      See            [facility]

a



     300MG      5-01                               Instructio           l



     TABLETS      12:38:                               ns, # 90           [facility]

n



               18                                 tab, TAKE           



                                                  1 TABLET           



                                                  BY MOUTH           



                                                  DAILY,           



                                                  Pharmacy:           



                                                  [facility]           



                                                  [facility]           



                                                  [id number]           

 

     lisinopril            No                       See            [facility]



     5 mg oral      5-01                               Instructio           l



     tablet      12:38:                               ns, # 90           [facility]



               18                                 tab, TAKE           



                                                  1 TABLET           



                                                  BY MOUTH           



                                                  DAILY,           



                                                  Pharmacy:           



                                                  [facility]           



                                                  [facility]           



                                                  [id number]           

 

     ALLOPURINOL            Yes                      See            [facility]

a



     300MG      5-01                               Instructio           l



     TABLETS      12:38:                               ns, # 90           [facility]

n



               18                                 tab, TAKE           



                                                  1 TABLET           



                                                  BY MOUTH           



                                                  DAILY,           



                                                  Pharmacy:           



                                                  [facility]           



                                                  [facility]           



                                                  [id number]           

 

     lisinopril            No                       See            [facility]



     5 mg oral      5-01                               Instructio           l



     tablet      12:38:                               ns, # 90           [facility]



               18                                 tab, TAKE           



                                                  1 TABLET           



                                                  BY MOUTH           



                                                  DAILY,           



                                                  Pharmacy:           



                                                  [facility]           



                                                  [facility]           



                                                  [id number]           

 

     ALLOPURINOL            Yes                      See            [facility]

a



     300MG      5-01                               Instructio           l



     TABLETS      12:38:                               ns, # 90           [facility]

n



               18                                 tab, TAKE           



                                                  1 TABLET           



                                                  BY MOUTH           



                                                  DAILY,           



                                                  Pharmacy:           



                                                  [facility]           



                                                  [facility]           



                                                  [id number]           

 

     calcitriol            Yes                      0.25           [facility]



     0.25 mcg      3-28                               microgram           l



     oral      13:49:                               = 1 cap,           [facility]



     capsule      00                                 PO, Daily,           



                                                  # 90 cap,           



                                                  3              



                                                  Refill(s),           



                                                  Pharmacy:           



                                                  [facility]           



                                                  [facility]           



                                                  [id number]           

 

     calcitriol      -      Yes                      0.25           [facility]



     0.25 mcg      3-28                               microgram           l



     oral      13:49:                               = 1 cap,           [facility]



     capsule      00                                 PO, Daily,           



                                                  # 90 cap,           



                                                  3              



                                                  Refill(s),           



                                                  Pharmacy:           



                                                  [facility]           



                                                  [facility]           



                                                  [id number]           

 

     calcitriol            Yes                      0.25           [facility]



     0.25 mcg      3-28                               microgram           l



     oral      13:49:                               = 1 cap,           [facility]



     capsule      00                                 PO, Daily,           



                                                  # 90 cap,           



                                                  3              



                                                  Refill(s),           



                                                  Pharmacy:           



                                                  [facility]           



                                                  [facility]           



                                                  [id number]           

 

     calcitriol            Yes                      0.25           [facility]



     0.25 mcg      3-28                               microgram           l



     oral      13:49:                               = 1 cap,           [facility]



     capsule      00                                 PO, Daily,           



                                                  # 90 cap,           



                                                  3              



                                                  Refill(s),           



                                                  Pharmacy:           



                                                  [facility]           



                                                  [facility]           



                                                  [id number]           

 

     calcitriol            Yes                      0.25           [facility]



     0.25 mcg      3-28                               microgram           l



     oral      13:49:                               = 1 cap,           [facility]



     capsule      00                                 PO, Daily,           



                                                  # 90 cap,           



                                                  3              



                                                  Refill(s),           



                                                  Pharmacy:           



                                                  [facility]           



                                                  [facility]           



                                                  [id number]           

 

     calcitriol            Yes                      0.25           [facility]



     0.25 mcg      3-28                               microgram           l



     oral      13:49:                               = 1 cap,           [facility]



     capsule      00                                 PO, Daily,           



                                                  # 90 cap,           



                                                  3              



                                                  Refill(s),           



                                                  Pharmacy:           



                                                  [facility]           



                                                  [facility]           



                                                  [id number]           

 

     calcitriol            Yes                      0.25           [facility]



     0.25 mcg      3-28                               microgram           l



     oral      13:49:                               = 1 cap,           [facility]



     capsule      00                                 PO, Daily,           



                                                  # 90 cap,           



                                                  3              



                                                  Refill(s),           



                                                  Pharmacy:           



                                                  [facility]           



                                                  [facility]           



                                                  [id number]           

 

     calcitriol            Yes                      0.25           [facility]



     0.25 mcg      3-28                               microgram           l



     oral      13:49:                               = 1 cap,           [facility]



     capsule      00                                 PO, Daily,           



                                                  # 90 cap,           



                                                  3              



                                                  Refill(s),           



                                                  Pharmacy:           



                                                  [facility]           



                                                  [facility]           



                                                  [id number]           

 

     Mupirocin            Yes                      1 appl,           [facility]

ia



     0.02 MG/MG      3-21                               TOP, BID,           l



     Topical      15:37:                               30 grams           [facility]

n



     Ointment      21                                 3each, # 3           



                                                  ea, 1           



                                                  Refill(s),           



                                                  Pharmacy:           



                                                  [facility]           



                                                  [facility]           



                                                  [id number]           

 

     Mupirocin            Yes                      1 appl,           [facility]

ia



     0.02 MG/MG      3-21                               TOP, BID,           l



     Topical      15:37:                               30 grams           [facility]

n



     Ointment      21                                 3each, # 3           



                                                  ea, 1           



                                                  Refill(s),           



                                                  Pharmacy:           



                                                  [facility]           



                                                  [facility]           



                                                  [id number]           

 

     Mupirocin            Yes                      1 appl,           [facility]

ia



     0.02 MG/MG      3-21                               TOP, BID,           l



     Topical      15:37:                               30 grams           [facility]

n



     Ointment      21                                 3each, # 3           



                                                  ea, 1           



                                                  Refill(s),           



                                                  Pharmacy:           



                                                  [facility]           



                                                  [facility]           



                                                  [id number]           

 

     Mupirocin            Yes                      1 appl,           [facility]

ia



     0.02 MG/MG      3-21                               TOP, BID,           l



     Topical      15:37:                               30 grams           [facility]

n



     Ointment      21                                 3each, # 3           



                                                  ea, 1           



                                                  Refill(s),           



                                                  Pharmacy:           



                                                  [facility]           



                                                  [facility] [address]            Yes                      1 appl,           [facility]

ia



     0.02 MG/MG      3-21                               TOP, BID,           l



     Topical      15:37:                               30 grams           [facility]

n



     Ointment      21                                 3each, # 3           



                                                  ea, 1           



                                                  Refill(s),           



                                                  Pharmacy:           



                                                  [facility]           



                                                  [facility] [address]            Yes                      1 appl,           [facility]

ia



     0.02 MG/MG      3-21                               TOP, BID,           l



     Topical      15:37:                               30 grams           [facility]

n



     Ointment      21                                 3each, # 3           



                                                  ea, 1           



                                                  Refill(s),           



                                                  Pharmacy:           



                                                  [facility]           



                                                  [facility] [address]            Yes                      1 appl,           [facility]

ia



     0.02 MG/MG      3-21                               TOP, BID,           l



     Topical      15:37:                               30 grams           [facility]

n



     Ointment      21                                 3each, # 3           



                                                  ea, 1           



                                                  Refill(s),           



                                                  Pharmacy:           



                                                  [facility]           



                                                  [facility] [address]            Yes                      1 appl,           [facility]

ia



     0.02 MG/MG      3-21                               TOP, BID,           l



     Topical      15:37:                               30 grams           [facility]

n



     Ointment      21                                 3each, # 3           



                                                  ea, 1           



                                                  Refill(s),           



                                                  Pharmacy:           



                                                  [facility]           



                                                  [facility] [address]            Yes                      See            [facility]

a



     n 40 mg      3-21                               Instructio           l



     oral tablet      15:36:                               ns, TAKE 1           

[facility]



               22                                 TABLET BY           



                                                  MOUTH           



                                                  EVERY           



                                                  NIGHT AT           



                                                  BEDTIME, #           



                                                  30 tab, 5           



                                                  Refill(s),           



                                                  Pharmacy:           



                                                  [facility]           



                                                  [facility] [address]            Yes                      See            [facility]

a



     n 40 mg      3-21                               Instructio           l



     oral tablet      15:36:                               ns, TAKE 1           

[facility]



               22                                 TABLET BY           



                                                  MOUTH           



                                                  EVERY           



                                                  NIGHT AT           



                                                  BEDTIME, #           



                                                  30 tab, 5           



                                                  Refill(s),           



                                                  Pharmacy:           



                                                  [facility]           



                                                  [address]            Yes                      See            [facility]

a



     n 40 mg      3-21                               Instructio           l



     oral tablet      15:36:                               ns, TAKE 1           

[facility]



               22                                 TABLET BY           



                                                  MOUTH           



                                                  EVERY           



                                                  NIGHT AT           



                                                  BEDTIME, #           



                                                  30 tab, 5           



                                                  Refill(s),           



                                                  Pharmacy:           



                                                  [address]            Yes                      See            [facility]

a



     n 40 mg      3-21                               Instructio           l



     oral tablet      15:36:                               ns, TAKE 1           

[facility]



               22                                 TABLET BY           



                                                  MOUTH           



                                                  EVERY           



                                                  NIGHT AT           



                                                  BEDTIME, #           



                                                  30 tab, 5           



                                                  Refill(s),           



                                                  Pharmacy:           



                                                  [address]            Yes                      See            [facility]

a



     n 40 mg      3-21                               Instructio           l



     oral tablet      15:36:                               ns, TAKE 1           

[facility]



               22                                 TABLET BY           



                                                  MOUTH           



                                                  EVERY           



                                                  NIGHT AT           



                                                  BEDTIME, #           



                                                  30 tab, 5           



                                                  Refill(s),           



                                                  Pharmacy:           



                                                  [address]            Yes                      See            [facility]

a



     n 40 mg      3-21                               Instructio           l



     oral tablet      15:36:                               ns, TAKE 1           

[facility]



               22                                 TABLET BY           



                                                  MOUTH           



                                                  EVERY           



                                                  NIGHT AT           



                                                  BEDTIME, #           



                                                  30 tab, 5           



                                                  Refill(s),           



                                                  Pharmacy:           



                                                  [address]            Yes                      See            [facility]

a



     n 40 mg      3-21                               Instructio           l



     oral tablet      15:36:                               ns, TAKE 1           

[facility]



               22                                 TABLET BY           



                                                  MOUTH           



                                                  EVERY           



                                                  NIGHT AT           



                                                  BEDTIME, #           



                                                  30 tab, 5           



                                                  Refill(s),           



                                                  Pharmacy:           



                                                  [address]            Yes                      See            [facility]

a



     n 40 mg      3-21                               Instructio           l



     oral tablet      15:36:                               ns, TAKE 1           

[facility]



               22                                 TABLET BY           



                                                  MOUTH           



                                                  EVERY           



                                                  NIGHT AT           



                                                  BEDTIME, #           



                                                  30 tab, 5           



                                                  Refill(s),           



                                                  Pharmacy:           



                                                  [place] [address]           

 

     amLODIPine            Yes                      See            [facility]



     5 mg oral      3-21                               Instructio           l



     tablet      15:36:                               ns, TAKE 1           [facility]

nn



               18                                 TABLET BY           



                                                  MOUTH           



                                                  EVERY DAY,           



                                                  # 30 tab,           



                                                  5              



                                                  Refill(s),           



                                                  Pharmacy:           



                                                  [place] [address]           

 

     amLODIPine            Yes                      See            [facility]



     5 mg oral      3-21                               Instructio           l



     tablet      15:36:                               ns, TAKE 1           [facility]

nn



               18                                 TABLET BY           



                                                  MOUTH           



                                                  EVERY DAY,           



                                                  # 30 tab,           



                                                  5              



                                                  Refill(s),           



                                                  Pharmacy:           



                                                  [place] [address]           

 

     amLODIPine            Yes                      See            [facility]



     5 mg oral      3-21                               Instructio           l



     tablet      15:36:                               ns, TAKE 1           [facility]

nn



               18                                 TABLET BY           



                                                  MOUTH           



                                                  EVERY DAY,           



                                                  # 30 tab,           



                                                  5              



                                                  Refill(s),           



                                                  Pharmacy:           



                                                  [place] [address]           

 

     amLODIPine            Yes                      See            [facility]



     5 mg oral      3-21                               Instructio           l



     tablet      15:36:                               ns, TAKE 1           [facility]

nn



               18                                 TABLET BY           



                                                  MOUTH           



                                                  EVERY DAY,           



                                                  # 30 tab,           



                                                  5              



                                                  Refill(s),           



                                                  Pharmacy:           



                                                  [place] [address]           

 

     amLODIPine            Yes                      See            [facility]



     5 mg oral      3-21                               Instructio           l



     tablet      15:36:                               ns, TAKE 1           [facility]

nn



               18                                 TABLET BY           



                                                  MOUTH           



                                                  EVERY DAY,           



                                                  # 30 tab,           



                                                  5              



                                                  Refill(s),           



                                                  Pharmacy:           



                                                  [place] [address]           

 

     amLODIPine            Yes                      See            [facility]



     5 mg oral      3-21                               Instructio           l



     tablet      15:36:                               ns, TAKE 1           [facility]

nn



               18                                 TABLET BY           



                                                  MOUTH           



                                                  EVERY DAY,           



                                                  # 30 tab,           



                                                  5              



                                                  Refill(s),           



                                                  Pharmacy:           



                                                  [place] [address]           

 

     amLODIPine            Yes                      See            [facility]



     5 mg oral      3-21                               Instructio           l



     tablet      15:36:                               ns, TAKE 1           [facility]

nn



               18                                 TABLET BY           



                                                  MOUTH           



                                                  EVERY DAY,           



                                                  # 30 tab,           



                                                  5              



                                                  Refill(s),           



                                                  Pharmacy:           



                                                  [place] [id number]           

 

     amLODIPine            Yes                      See            [facility]



     5 mg oral      3-21                               Instructio           l



     tablet      15:36:                               ns, TAKE 1           [facility]

nn



               18                                 TABLET BY           



                                                  MOUTH           



                                                  EVERY DAY,           



                                                  # 30 tab,           



                                                  5              



                                                  Refill(s),           



                                                  Pharmacy:           



                                                  [place] [id number]           

 

     aspirin 81            Yes                      81 mg = 1           [facility]

[facility]



     mg tablet,      1-24                               tab, PO,           l



     enteric      16:34:                               Daily, #           [facility]

n



     coated      00                                 90 tab, 3           



                                                  Refill(s)           

 

     aspirin 81            Yes                      81 mg = 1           [facility]

[facility]



     mg tablet,      1-24                               tab, PO,           l



     enteric      16:34:                               Daily, #           [facility]

n



     coated      00                                 90 tab, 3           



                                                  Refill(s)           

 

     aspirin 81      -      Yes                      81 mg = 1           [facility]

[facility]



     mg tablet,      1-24                               tab, PO,           l



     enteric      16:34:                               Daily, #           [facility]

n



     coated      00                                 90 tab, 3           



                                                  Refill(s)           

 

     aspirin 81            Yes                      81 mg = 1           [facility]

[facility]



     mg tablet,      1-24                               tab, PO,           l



     enteric      16:34:                               Daily, #           [facility]

n



     coated      00                                 90 tab, 3           



                                                  Refill(s)           

 

     aspirin 81      2017-0      Yes                      81 mg = 1           [facility]

[facility]



     mg tablet,      1-24                               tab, PO,           l



     enteric      16:34:                               Daily, #           [facility]

n



     coated      00                                 90 tab, 3           



                                                  Refill(s)           

 

     aspirin 81      2017-0      Yes                      81 mg = 1           [facility]

[facility]



     mg tablet,      1-24                               tab, PO,           l



     enteric      16:34:                               Daily, #           [facility]

n



     coated      00                                 90 tab, 3           



                                                  Refill(s)           

 

     aspirin 81      2017      Yes                      81 mg = 1           [facility]

[facility]



     mg tablet,      1-24                               tab, PO,           l



     enteric      16:34:                               Daily, #           [facility]

n



     coated      00                                 90 tab, 3           



                                                  Refill(s)           

 

     aspirin 81      2017-      Yes                      81 mg = 1           [facility]

[facility]



     mg tablet,      1-24                               tab, PO,           l



     enteric      16:34:                               Daily, #           [facility]

n



     coated      00                                 90 tab, 3           



                                                  Refill(s)           

 

     Xopenex            No   Ghassan K                0.63 mg =           [facility]

[facility]



     0.63 mg/3      3-11           [facility]                3 mL,           l



     mL        01:00:                               Soln, NEB,           [facility]



     inhalation      00                                 Once,           



     solution                                         first dose           



                                                  03/10/16           



                                                  19:00:00           



                                                  CST, stop           



                                                  date           



                                                  03/10/16           



                                                  19:00:00           



                                                  CST            

 

     Xopenex      0      No   Ghassan K                0.63 mg =           [facility]

[facility]



     0.63 mg/3      3-11           Chun                3 mL,           l



     mL        01:00:                               Soln, NEB,           [facility]



     inhalation      00                                 Once,           



     solution                                         first dose           



                                                  03/10/16           



                                                  19:00:00           



                                                  CST, stop           



                                                  date           



                                                  03/10/16           



                                                  19:00:00           



                                                  CST            

 

     Xopenex      0      No   Ghassan K                0.63 mg =           [facility]

[facility]



     0.63 mg/3      3-11           Ch[facility]                3 mL,           l



     mL        01:00:                               Soln, NEB,           [facility]



     inhalation      00                                 Once,           



     solution                                         first dose           



                                                  03/10/16           



                                                  19:00:00           



                                                  CST, stop           



                                                  date           



                                                  03/10/16           



                                                  19:00:00           



                                                  CST            

 

     Xopenex      0      No   Ghassan K                0.63 mg =           [facility]

[facility]



     0.63 mg/3      3-11           [facility]un                3 mL,           l



     mL        01:00:                               Soln, NEB,           [facility]



     inhalation      00                                 Once,           



     solution                                         first dose           



                                                  03/10/16           



                                                  19:00:00           



                                                  CST, stop           



                                                  date           



                                                  03/10/16           



                                                  19:00:00           



                                                  CST            

 

     Xopenex      2016-0      No   Ghassan K                0.63 mg =           [facility]

[facility]



     0.63 mg/3      3-11           Chun                3 mL,           l



     mL        01:00:                               Soln, NEB,           [facility]



     inhalation      00                                 Once,           



     solution                                         first dose           



                                                  03/10/16           



                                                  19:00:00           



                                                  CST, stop           



                                                  date           



                                                  03/10/16           



                                                  19:00:00           



                                                  CST            

 

     Xopenex      -0      No   Ghassan K                0.63 mg =           [facility]

[facility]



     0.63 mg/3      3-11           Chun                3 mL,           l



     mL        01:00:                               Soln, NEB,           [facility]



     inhalation      00                                 Once,           



     solution                                         first dose           



                                                  03/10/16           



                                                  19:00:00           



                                                  CST, stop           



                                                  date           



                                                  03/10/16           



                                                  19:00:00           



                                                  CST            

 

     Xopenex      -0      No   Ghassan K                0.63 mg =           [facility]

[facility]



     0.63 mg/3      3-11           [facility]                3 mL,           l



     mL        01:00:                               Soln, NEB,           [facility]



     inhalation      00                                 Once,           



     solution                                         first dose           



                                                  03/10/16           



                                                  19:00:00           



                                                  CST, stop           



                                                  date           



                                                  03/10/16           



                                                  19:00:00           



                                                  CST            

 

     Xopenex      -0      No   Ghassan K                0.63 mg =           [facility]

[facility]



     0.63 mg/3      3-11           [facility]                3 mL,           l



     mL        01:00:                               Soln, NEB,           [facility]



     inhalation      00                                 Once,           



     solution                                         first dose           



                                                  03/10/16           



                                                  19:00:00           



                                                  CST, stop           



                                                  date           



                                                  03/10/16           



                                                  19:00:00           



                                                  CST            

 

     Misc      -0      No   Deuce                300 mL,           [facility]



     Medication      3-11           Wheat                Soln-IV,           l



               00:03:                               IV, Once,           [facility]



               00                                 first dose           



                                                  03/10/16           



                                                  18:03:00           



                                                  CST, stop           



                                                  date           



                                                  03/10/16           



                                                  18:03:00           



                                                  CST            

 

     Misc      -0      No   Deuce                300 mL,           [facility]



     Medication      3-11           Wheat                Soln-IV,           l



               00:03:                               IV, Once,           [facility]



               00                                 first dose           



                                                  03/10/16           



                                                  18:03:00           



                                                  CST, stop           



                                                  date           



                                                  03/10/16           



                                                  18:03:00           



                                                  CST            

 

     Misc      -0      No   Deuce                300 mL,           [facility]



     Medication      3-11           Wheat                Soln-IV,           l



               00:03:                               IV, Once,           [facility]



               00                                 first dose           



                                                  03/10/16           



                                                  18:03:00           



                                                  CST, stop           



                                                  date           



                                                  03/10/16           



                                                  18:03:00           



                                                  CST            

 

     Misc            No   Deuce                300 mL,           [facility]



     Medication      3-11           Wheat                Soln-IV,           l



               00:03:                               IV, Once,           [facility]                                 first dose           



                                                  03/10/16           



                                                  18:03:00           



                                                  CST, stop           



                                                  date           



                                                  03/10/16           



                                                  18:03:00           



                                                  CST            

 

     Misc            No   Deuce                300 mL,           [facility]



     Medication      3-11           Wheat                Soln-IV,           l



               00:03:                               IV, Once,                                            first dose           



                                                  03/10/16           



                                                  18:03:00           



                                                  CST, stop           



                                                  date           



                                                  03/10/16           



                                                  18:03:00           



                                                  CST            

 

     Misc            No   Deuce                300 mL,           [facility]



     Medication      3-11           [facility]                Soln-IV,           l



               00:03:                               IV, Once,                                            first dose           



                                                  03/10/16           



                                                  18:03:00           



                                                  CST, stop           



                                                  date           



                                                  03/10/16           



                                                  18:03:00           



                                                  CST            

 

     Misc            No   Deuce                300 mL,           [facility]



     Medication      3-11           [facility]                Soln-IV,           l



               00:03:                               IV, Once,                                            first dose           



                                                  03/10/16           



                                                  18:03:00           



                                                  CST, stop           



                                                  date           



                                                  03/10/16           



                                                  18:03:00           



                                                  CST            

 

     Misc            No   Educe                300 mL,           [facility]



     Medication      3-11           [facility]                Soln-IV,           l



               00:03:                               IV, Once,                                            first dose           



                                                  03/10/16           



                                                  18:03:00           



                                                  CST, stop           



                                                  date           



                                                  03/10/16           



                                                  18:03:00           



                                                  CST            

 

     promethazin            No   Ghassan K                12.5 mg =          

 [facility]



     e         3-11           [facility]                0.5 mL,           l



               00:02:                               Injection,           [facility]



               00                                 IM, Once           



                                                  PRN for           



                                                  severe           



                                                  nausea,           



                                                  first dose           



                                                  03/10/16           



                                                  18:02:00           



                                                  CST            

 

     albuterol            No   Ghassan K                2.5 mg = 3           

[facility]



     2.5 mg/3 mL      3-11           [facility]                mL, Soln,           

l



     (0.083%)      00:02:                               NEB, Once           [facility]

[facility]



     inhalation      00                                 PRN for           



     solution                                         wheezing,           



                                                  first dose           



                                                  03/10/16           



                                                  18:02:00           



                                                  CST            

 

     Demerol HCl            No   Ghassan K                12.5 mg =          

 [facility]



               3-11           [facility]                0.25 mL,           l



               00:02:                               Injection,           [facility]



               00                                 IV Push,           



                                                  Once PRN           



                                                  for            



                                                  shivers,           



                                                  first dose           



                                                  03/10/16           



                                                  18:02:00           



                                                  CST            

 

     ondansetron            No   Ghassan K                4 mg = 2           

[facility]



               3-11           [facility]                mL,            l



               00:02:                               Injection,           [facility]



               00                                 IV Push,           



                                                  q15min PRN           



                                                  for            



                                                  nausea/vom           



                                                  iting,           



                                                  order           



                                                  duration:           



                                                  2 doses,           



                                                  first dose           



                                                  03/10/16           



                                                  18:02:00           



                                                  CST, stop           



                                                  date           



                                                  Limited #           



                                                  of times           

 

     Dilaudid            No   Ghassan K                0.5 mg =           [facility]

[facility]



               3-11           [facility]                0.25 mL,           l



               00:02:                               Injection,           [facility]



               00                                 IV Push,           



                                                  q10min PRN           



                                                  for pain           



                                                  severe           



                                                  (7-10),           



                                                  first dose           



                                                  03/10/16           



                                                  18:02:00           



                                                  CST            

 

     diphenhydrA            No   Ghassan K                25 mg =           [facility]

[facility]



     MINE      3-11           Chun                0.5 mL,           l



               00:02:                               Injection,           [facility]



               00                                 IV Push,           



                                                  Once PRN           



                                                  for            



                                                  itching,           



                                                  first dose           



                                                  03/10/16           



                                                  18:02:00           



                                                  CST            

 

     LR 1,000 mL            No   Ghassan K                1,000 mL,          

 [facility]



               3-11           Ch[facility]                IV, 75           l



               00:02:                               mL/hr,           [facility]



               00                                 start date           



                                                  03/10/16           



                                                  18:02:00           



                                                  CST            

 

     Saline Lock            No   Ghassan K                10 mL,           Me

[facility]



     Flush      3-11           Chun                Soln, IV           l



               00:02:                               Push, As           [facility]



               00                                 Indicated           



                                                  PRN for           



                                                  flush,           



                                                  first dose           



                                                  03/10/16           



                                                  18:02:00           



                                                  CST            

 

     Bupivacaine            No   Ghassan K                300 mL,           M

[facility]



     0.25% 300      3-11           Chun                Nerve           l



     mL pump 300      00:02:                               Block, 5           He

[facility]



     mL        00                                 mL/hr,           



                                                  start date           



                                                  03/10/16           



                                                  18:02:00           



                                                  CST            

 

     promethazin            No   Ghassan K                12.5 mg =          

 [facility]



     e         3-11           Chun                0.5 mL,           l



               00:02:                               Injection,           [facility]



               00                                 IM, Once           



                                                  PRN for           



                                                  severe           



                                                  nausea,           



                                                  first dose           



                                                  03/10/16           



                                                  18:02:00           



                                                  CST            

 

     albuterol            No   Ghassan K                2.5 mg = 3           

[facility]



     2.5 mg/3 mL      3-11           Chun                mL, Soln,           

l



     (0.083%)      00:02:                               NEB, Once           [facility]

daryl



     inhalation      00                                 PRN for           



     solution                                         wheezing,           



                                                  first dose           



                                                  03/10/16           



                                                  18:02:00           



                                                  CST            

 

     Demerol HCl            No   Ghassan K                12.5 mg =          

 [facility]



               3-11           Chun                0.25 mL,           l



               00:02:                               Injection,           [facility]



               00                                 IV Push,           



                                                  Once PRN           



                                                  for            



                                                  shivers,           



                                                  first dose           



                                                  03/10/16           



                                                  18:02:00           



                                                  CST            

 

     ondansetron            No   Ghassan K                4 mg = 2           

[facility]



               3-11           Chun                mL,            l



               00:02:                               Injection,           [facility]



               00                                 IV Push,           



                                                  q15min PRN           



                                                  for            



                                                  nausea/vom           



                                                  iting,           



                                                  order           



                                                  duration:           



                                                  2 doses,           



                                                  first dose           



                                                  03/10/16           



                                                  18:02:00           



                                                  CST, stop           



                                                  date           



                                                  Limited #           



                                                  of times           

 

     Dilaudid            No   Ghassan K                0.5 mg =           [facility]

[facility]



               3-11           Chun                0.25 mL,           l



               00:02:                               Injection,           [facility]



               00                                 IV Push,           



                                                  q10min PRN           



                                                  for pain           



                                                  severe           



                                                  (7-10),           



                                                  first dose           



                                                  03/10/16           



                                                  18:02:00           



                                                  CST            

 

     diphenhydrA            No   Ghassan K                25 mg =           M

[facility]



     MINE      3-11           Chun                0.5 mL,           l



               00:02:                               Injection,           [facility]



               00                                 IV Push,           



                                                  Once PRN           



                                                  for            



                                                  itching,           



                                                  first dose           



                                                  03/10/16           



                                                  18:02:00           



                                                  CST            

 

     LR 1,000 mL            No   Ghassan K                1,000 mL,          

 [facility]



               3-11           Chun                IV, 75           l



               00:02:                               mL/hr,           [facility]



               00                                 start date           



                                                  03/10/16           



                                                  18:02:00           



                                                  CST            

 

     Saline Lock            No   Ghassan K                10 mL,           Me

[facility]



     Flush      3-11           Chun                Soln, IV           l



               00:02:                               Push, As           [facility]



               00                                 Indicated           



                                                  PRN for           



                                                  flush,           



                                                  first dose           



                                                  03/10/16           



                                                  18:02:00           



                                                  CST            

 

     Bupivacaine            No   Ghassan K                300 mL,           M

[facility]



     0.25% 300      3-11           Chun                Nerve           l



     mL pump 300      00:02:                               Block, 5           He

[facility]



     mL        00                                 mL/hr,           



                                                  start date           



                                                  03/10/16           



                                                  18:02:00           



                                                  CST            

 

     promethazin            No   Ghassan K                12.5 mg =          

 [facility]



     e         3-11           Chun                0.5 mL,           l



               00:02:                               Injection,           [facility]



               00                                 IM, Once           



                                                  PRN for           



                                                  severe           



                                                  nausea,           



                                                  first dose           



                                                  03/10/16           



                                                  18:02:00           



                                                  CST            

 

     albuterol            No   Ghassan K                2.5 mg = 3           

[facility]



     2.5 mg/3 mL      3-11           Chun                mL, Soln,           

l



     (0.083%)      00:02:                               NEB, Once           [facility]

d[facility]



     inhalation      00                                 PRN for           



     solution                                         wheezing,           



                                                  first dose           



                                                  03/10/16           



                                                  18:02:00           



                                                  CST            

 

     Demerol HCl            No   Ghassan K                12.5 mg =          

 [facility]



               3-11           Chun                0.25 mL,           l



               00:02:                               Injection,           [facility]



               00                                 IV Push,           



                                                  Once PRN           



                                                  for            



                                                  shivers,           



                                                  first dose           



                                                  03/10/16           



                                                  18:02:00           



                                                  CST            

 

     ondansetron            No   Ghassan K                4 mg = 2           

[facility]



               3-11           Chun                mL,            l



               00:02:                               Injection,           [facility]



               00                                 IV Push,           



                                                  q15min PRN           



                                                  for            



                                                  nausea/vom           



                                                  iting,           



                                                  order           



                                                  duration:           



                                                  2 doses,           



                                                  first dose           



                                                  03/10/16           



                                                  18:02:00           



                                                  CST, stop           



                                                  date           



                                                  Limited #           



                                                  of times           

 

     Dilaudid            No   Ghassan K                0.5 mg =           [facility]

[facility]



               3-11           Chun                0.25 mL,           l



               00:02:                               Injection,           [facility]



               00                                 IV Push,           



                                                  q10min PRN           



                                                  for pain           



                                                  severe           



                                                  (7-10),           



                                                  first dose           



                                                  03/10/16           



                                                  18:02:00           



                                                  CST            

 

     diphenhydrA            No   Ghassan K                25 mg =           M

[facility]



     MINE      3-11           Chun                0.5 mL,           l



               00:02:                               Injection,           [facility]



               00                                 IV Push,           



                                                  Once PRN           



                                                  for            



                                                  itching,           



                                                  first dose           



                                                  03/10/16           



                                                  18:02:00           



                                                  CST            

 

     LR 1,000 mL            No   Ghassan K                1,000 mL,          

 [facility]



               3-11           Chun                IV, 75           l



               00:02:                               mL/hr,           [facility]



               00                                 start date           



                                                  03/10/16           



                                                  18:02:00           



                                                  CST            

 

     Saline Lock            No   Ghassan K                10 mL,           Me

[facility]



     Flush      3-11           Chun                Soln, IV           l



               00:02:                               Push, As           [facility]



               00                                 Indicated           



                                                  PRN for           



                                                  flush,           



                                                  first dose           



                                                  03/10/16           



                                                  18:02:00           



                                                  CST            

 

     Bupivacaine            No   Ghassan K                300 mL,           M

[facility]



     0.25% 300      3-11           Chun                Nerve           l



     mL pump 300      00:02:                               Block, 5           He

[facility]



     mL        00                                 mL/hr,           



                                                  start date           



                                                  03/10/16           



                                                  18:02:00           



                                                  CST            

 

     promethazin            No   Ghassan K                12.5 mg =          

 [facility]



     e         3-11           Chun                0.5 mL,           l



               00:02:                               Injection,           [facility]



               00                                 IM, Once           



                                                  PRN for           



                                                  severe           



                                                  nausea,           



                                                  first dose           



                                                  03/10/16           



                                                  18:02:00           



                                                  CST            

 

     albuterol            No   Ghassan K                2.5 mg = 3           

[facility]



     2.5 mg/3 mL      3-11           Chun                mL, Soln,           

l



     (0.083%)      00:02:                               NEB, Once           [facility]

d[facility]



     inhalation      00                                 PRN for           



     solution                                         wheezing,           



                                                  first dose           



                                                  03/10/16           



                                                  18:02:00           



                                                  CST            

 

     Demerol HCl            No   Ghassan K                12.5 mg =          

 [facility]



               3-11           Chun                0.25 mL,           l



               00:02:                               Injection,           [facility]



               00                                 IV Push,           



                                                  Once PRN           



                                                  for            



                                                  shivers,           



                                                  first dose           



                                                  03/10/16           



                                                  18:02:00           



                                                  CST            

 

     ondansetron            No   Ghassan K                4 mg = 2           

[facility]



               3-11           Chun                mL,            l



               00:02:                               Injection,           [facility]



               00                                 IV Push,           



                                                  q15min PRN           



                                                  for            



                                                  nausea/vom           



                                                  iting,           



                                                  order           



                                                  duration:           



                                                  2 doses,           



                                                  first dose           



                                                  03/10/16           



                                                  18:02:00           



                                                  CST, stop           



                                                  date           



                                                  Limited #           



                                                  of times           

 

     Dilaudid            No   Ghassan K                0.5 mg =           [facility]

[facility]



               3-11           Chun                0.25 mL,           l



               00:02:                               Injection,           [facility]



               00                                 IV Push,           



                                                  q10min PRN           



                                                  for pain           



                                                  severe           



                                                  (7-10),           



                                                  first dose           



                                                  03/10/16           



                                                  18:02:00           



                                                  CST            

 

     diphenhydrA            No   Ghassan K                25 mg =           M

[facility]



     MINE      3-11           Chun                0.5 mL,           l



               00:02:                               Injection,           [facility]



               00                                 IV Push,           



                                                  Once PRN           



                                                  for            



                                                  itching,           



                                                  first dose           



                                                  03/10/16           



                                                  18:02:00           



                                                  CST            

 

     LR 1,000 mL            No   Ghassan K                1,000 mL,          

 [facility]



               3-11           Chun                IV, 75           l



               00:02:                               mL/hr,           [facility]



               00                                 start date           



                                                  03/10/16           



                                                  18:02:00           



                                                  CST            

 

     Saline Lock            No   Ghassan K                10 mL,           Me

[facility]



     Flush      3-11           Chun                Soln, IV           l



               00:02:                               Push, As           [facility]



               00                                 Indicated           



                                                  PRN for           



                                                  flush,           



                                                  first dose           



                                                  03/10/16           



                                                  18:02:00           



                                                  CST            

 

     Bupivacaine            No   Ghassan K                300 mL,           M

[facility]



     0.25% 300      3-11           Chun                Nerve           l



     mL pump 300      00:02:                               Block, 5           [facility]

[facility]



     mL        00                                 mL/hr,           



                                                  start date           



                                                  03/10/16           



                                                  18:02:00           



                                                  CST            

 

     promethazin            No   Ghassan K                12.5 mg =          

 [facility]



     e         3-11           Chun                0.5 mL,           l



               00:02:                               Injection,           [facility]



               00                                 IM, Once           



                                                  PRN for           



                                                  severe           



                                                  nausea,           



                                                  first dose           



                                                  03/10/16           



                                                  18:02:00           



                                                  CST            

 

     albuterol            No   Ghassan K                2.5 mg = 3           

[facility]



     2.5 mg/3 mL      3-11           Chun                mL, Soln,           

l



     (0.083%)      00:02:                               NEB, Once           [facility]

d[facility]



     inhalation      00                                 PRN for           



     solution                                         wheezing,           



                                                  first dose           



                                                  03/10/16           



                                                  18:02:00           



                                                  CST            

 

     Demerol HCl            No   Ghassan K                12.5 mg =          

 [facility]



               3-11           [facility]un                0.25 mL,           l



               00:02:                               Injection,           [facility]



               00                                 IV Push,           



                                                  Once PRN           



                                                  for            



                                                  shivers,           



                                                  first dose           



                                                  03/10/16           



                                                  18:02:00           



                                                  CST            

 

     ondansetron            No   Ghassan K                4 mg = 2           

[facility]



               3-11           [facility]un                mL,            l



               00:02:                               Injection,           [facility]



               00                                 IV Push,           



                                                  q15min PRN           



                                                  for            



                                                  nausea/vom           



                                                  iting,           



                                                  order           



                                                  duration:           



                                                  2 doses,           



                                                  first dose           



                                                  03/10/16           



                                                  18:02:00           



                                                  CST, stop           



                                                  date           



                                                  Limited #           



                                                  of times           

 

     Dilaudid            No   Ghassan K                0.5 mg =           [facility]

[facility]



               3-11           Chun                0.25 mL,           l



               00:02:                               Injection,           [facility]



               00                                 IV Push,           



                                                  q10min PRN           



                                                  for pain           



                                                  severe           



                                                  (7-10),           



                                                  first dose           



                                                  03/10/16           



                                                  18:02:00           



                                                  CST            

 

     diphenhydrA            No   Ghassan K                25 mg =           [facility]

[facility]



     MINE      3-11           Chun                0.5 mL,           l



               00:02:                               Injection,           [facility]



               00                                 IV Push,           



                                                  Once PRN           



                                                  for            



                                                  itching,           



                                                  first dose           



                                                  03/10/16           



                                                  18:02:00           



                                                  CST            

 

     LR 1,000 mL            No   Ghassan K                1,000 mL,          

 [facility]



               3-11           Chun                IV, 75           l



               00:02:                               mL/hr,           [facility]



               00                                 start date           



                                                  03/10/16           



                                                  18:02:00           



                                                  CST            

 

     Saline Lock            No   Ghassan K                10 mL,           [facility]

[facility]



     Flush      3-11           Chun                Soln, IV           l



               00:02:                               Push, As           [facility]



               00                                 Indicated           



                                                  PRN for           



                                                  flush,           



                                                  first dose           



                                                  03/10/16           



                                                  18:02:00           



                                                  CST            

 

     Bupivacaine            No   Ghassan K                300 mL,           [facility]

[facility]



     0.25% 300      3-11           Chun                Nerve           l



     mL pump 300      00:02:                               Block, 5           [facility]

[facility]



     mL        00                                 mL/hr,           



                                                  start date           



                                                  03/10/16           



                                                  18:02:00           



                                                  CST            

 

     promethazin            No   Ghassan K                12.5 mg =          

 [facility]



     e         3-11           Chun                0.5 mL,           l



               00:02:                               Injection,           [facility]



               00                                 IM, Once           



                                                  PRN for           



                                                  severe           



                                                  nausea,           



                                                  first dose           



                                                  03/10/16           



                                                  18:02:00           



                                                  CST            

 

     albuterol            No   Ghassan K                2.5 mg = 3           

[facility]



     2.5 mg/3 mL      3-11           Chun                mL, Soln,           

l



     (0.083%)      00:02:                               NEB, Once           [facility]

[facility]



     inhalation      00                                 PRN for           



     solution                                         wheezing,           



                                                  first dose           



                                                  03/10/16           



                                                  18:02:00           



                                                  CST            

 

     Demerol HCl            No   Ghassan K                12.5 mg =          

 [facility]



               3-11           Chun                0.25 mL,           l



               00:02:                               Injection,           [facility]



               00                                 IV Push,           



                                                  Once PRN           



                                                  for            



                                                  shivers,           



                                                  first dose           



                                                  03/10/16           



                                                  18:02:00           



                                                  CST            

 

     ondansetron            No   Ghassan K                4 mg = 2           

[facility]



               3-11           Chun                mL,            l



               00:02:                               Injection,           [facility]



               00                                 IV Push,           



                                                  q15min PRN           



                                                  for            



                                                  nausea/vom           



                                                  iting,           



                                                  order           



                                                  duration:           



                                                  2 doses,           



                                                  first dose           



                                                  03/10/16           



                                                  18:02:00           



                                                  CST, stop           



                                                  date           



                                                  Limited #           



                                                  of times           

 

     Dilaudid            No   Ghassan K                0.5 mg =           [facility]

[facility]



               3-11           Chun                0.25 mL,           l



               00:02:                               Injection,           [facility]



               00                                 IV Push,           



                                                  q10min PRN           



                                                  for pain           



                                                  severe           



                                                  (7-10),           



                                                  first dose           



                                                  03/10/16           



                                                  18:02:00           



                                                  CST            

 

     diphenhydrA            No   Ghassan K                25 mg =           [facility]

[facility]



     MINE      3-11           Chun                0.5 mL,           l



               00:02:                               Injection,           [facility]



               00                                 IV Push,           



                                                  Once PRN           



                                                  for            



                                                  itching,           



                                                  first dose           



                                                  03/10/16           



                                                  18:02:00           



                                                  CST            

 

     LR 1,000 mL            No   Ghassan K                1,000 mL,          

 [facility]



               3-11           [facility]un                IV, 75           l



               00:02:                               mL/hr,           [facility]



               00                                 start date           



                                                  03/10/16           



                                                  18:02:00           



                                                  CST            

 

     Saline Lock            No   Ghassan K                10 mL,           [facility]

[facility]



     Flush      3-11           Chun                Soln, IV           l



               00:02:                               Push, As           [facility]



               00                                 Indicated           



                                                  PRN for           



                                                  flush,           



                                                  first dose           



                                                  03/10/16           



                                                  18:02:00           



                                                  CST            

 

     Bupivacaine            No   Ghassan K                300 mL,           [facility]

[facility]



     0.25% 300      3-11           Chun                Nerve           l



     mL pump 300      00:02:                               Block, 5           [facility]

[facility]



     mL        00                                 mL/hr,           



                                                  start date           



                                                  03/10/16           



                                                  18:02:00           



                                                  CST            

 

     promethazin            No   Ghassan K                12.5 mg =          

 [facility]



     e         3-11           Chun                0.5 mL,           l



               00:02:                               Injection,           [facility]



               00                                 IM, Once           



                                                  PRN for           



                                                  severe           



                                                  nausea,           



                                                  first dose           



                                                  03/10/16           



                                                  18:02:00           



                                                  CST            

 

     albuterol            No   Ghassan K                2.5 mg = 3           

[facility]



     2.5 mg/3 mL      3-11           Chun                mL, Soln,           

l



     (0.083%)      00:02:                               NEB, Once           [facility]

[facility]



     inhalation      00                                 PRN for           



     solution                                         wheezing,           



                                                  first dose           



                                                  03/10/16           



                                                  18:02:00           



                                                  CST            

 

     Demerol HCl            No   Ghassan K                12.5 mg =          

 [facility]



               3-11           [facility]                0.25 mL,           l



               00:02:                               Injection,           [facility]



               00                                 IV Push,           



                                                  Once PRN           



                                                  for            



                                                  shivers,           



                                                  first dose           



                                                  03/10/16           



                                                  18:02:00           



                                                  CST            

 

     ondansetron            No   Ghassan K                4 mg = 2           

[facility]



               3-11           [facility]                mL,            l



               00:02:                               Injection,           [facility]



               00                                 IV Push,           



                                                  q15min PRN           



                                                  for            



                                                  nausea/vom           



                                                  iting,           



                                                  order           



                                                  duration:           



                                                  2 doses,           



                                                  first dose           



                                                  03/10/16           



                                                  18:02:00           



                                                  CST, stop           



                                                  date           



                                                  Limited #           



                                                  of times           

 

     Dilaudid            No   Ghassan K                0.5 mg =           [facility]

[facility]



               3-11           [facility]                0.25 mL,           l



               00:02:                               Injection,           [facility]



               00                                 IV Push,           



                                                  q10min PRN           



                                                  for pain           



                                                  severe           



                                                  (7-10),           



                                                  first dose           



                                                  03/10/16           



                                                  18:02:00           



                                                  CST            

 

     diphenhydrA            No   Ghassan K                25 mg =           [facility]

[facility]



     MINE      3-11           Chun                0.5 mL,           l



               00:02:                               Injection,           [facility]



               00                                 IV Push,           



                                                  Once PRN           



                                                  for            



                                                  itching,           



                                                  first dose           



                                                  03/10/16           



                                                  18:02:00           



                                                  CST            

 

     LR 1,000 mL            No   Ghassan K                1,000 mL,          

 [facility]



               3-11           [facility]                IV, 75           l



               00:02:                               mL/hr,           [facility]



               00                                 start date           



                                                  03/10/16           



                                                  18:02:00           



                                                  CST            

 

     Saline Lock            No   Ghassan K                10 mL,           [facility]

[facility]



     Flush      3-11           Ch[facility]                Soln, IV           l



               00:02:                               Push, As           [facility]



               00                                 Indicated           



                                                  PRN for           



                                                  flush,           



                                                  first dose           



                                                  03/10/16           



                                                  18:02:00           



                                                  CST            

 

     Bupivacaine            No   Ghassan K                300 mL,           [facility]

[facility]



     0.25% 300      3-11           Chun                Nerve           l



     mL pump 300      00:02:                               Block, 5           [facility]

[facility]



     mL        00                                 mL/hr,           



                                                  start date           



                                                  03/10/16           



                                                  18:02:00           



                                                  CST            

 

     promethazin            No   Ghassan K                12.5 mg =          

 [facility]



     e         3-11           [facility]                0.5 mL,           l



               00:02:                               Injection,           [facility]



               00                                 IM, Once           



                                                  PRN for           



                                                  severe           



                                                  nausea,           



                                                  first dose           



                                                  03/10/16           



                                                  18:02:00           



                                                  CST            

 

     albuterol            No   Ghassan K                2.5 mg = 3           

[facility]



     2.5 mg/3 mL      3-11           [facility]                mL, Soln,           

l



     (0.083%)      00:02:                               NEB, Once           [facility]

[facility]



     inhalation      00                                 PRN for           



     solution                                         wheezing,           



                                                  first dose           



                                                  03/10/16           



                                                  18:02:00           



                                                  CST            

 

     Demerol HCl            No   Ghassan K                12.5 mg =          

 [facility]



               3-11           [facility]                0.25 mL,           l



               00:02:                               Injection,           [facility]



               00                                 IV Push,           



                                                  Once PRN           



                                                  for            



                                                  shivers,           



                                                  first dose           



                                                  03/10/16           



                                                  18:02:00           



                                                  CST            

 

     ondansetron            No   Ghassan K                4 mg = 2           

[facility]



               3-11           [facility]                mL,            l



               00:02:                               Injection,           [facility]



               00                                 IV Push,           



                                                  q15min PRN           



                                                  for            



                                                  nausea/vom           



                                                  iting,           



                                                  order           



                                                  duration:           



                                                  2 doses,           



                                                  first dose           



                                                  03/10/16           



                                                  18:02:00           



                                                  CST, stop           



                                                  date           



                                                  Limited #           



                                                  of times           

 

     Dilaudid            No   Ghassan K                0.5 mg =           [facility]

[facility]



               3-11           [facility]                0.25 mL,           l



               00:02:                               Injection,                                            IV Push,           



                                                  q10min PRN           



                                                  for pain           



                                                  severe           



                                                  (7-10),           



                                                  first dose           



                                                  03/10/16           



                                                  18:02:00           



                                                  CST            

 

     diphenhydrA            No   Ghassan K                25 mg =           M

[facility]



     MINE      3-11           Chun                0.5 mL,           l



               00:02:                               Injection,                                            IV Push,           



                                                  Once PRN           



                                                  for            



                                                  itching,           



                                                  first dose           



                                                  03/10/16           



                                                  18:02:00           



                                                  CST            

 

     LR 1,000 mL            No   Ghassan K                1,000 mL,          

 [facility]



               3-11           Chun                IV, 75           l



               00:02:                               mL/hr,                                            start date           



                                                  03/10/16           



                                                  18:02:00           



                                                  CST            

 

     Saline Lock            No   Ghassan K                10 mL,           Me

[facility]



     Flush      3-11           Chun                Soln, IV           l



               00:02:                               Push, As                                            Indicated           



                                                  PRN for           



                                                  flush,           



                                                  first dose           



                                                  03/10/16           



                                                  18:02:00           



                                                  CST            

 

     Bupivacaine            No   Ghassan K                300 mL,           M

[facility]



     0.25% 300      3-11           Chun                Nerve           l



     mL pump 300      00:02:                               Block, 5           He

rmann



     mL        00                                 mL/hr,           



                                                  start date           



                                                  03/10/16           



                                                  18:02:00           



                                                  CST            

 

     Misc            No   Deuce                1,000 mL,           [facility]

a



     Medication      3-10           [facility]                Soln-IV,           l



               23:42:                               IV, Once,                                            first dose           



                                                  03/10/16           



                                                  17:42:00           



                                                  CST, stop           



                                                  date           



                                                  03/10/16           



                                                  17:42:00           



                                                  CST            

 

     Misc            No   Deuce                1,000 mL,           [facility]

a



     Medication      3-10           [facility]                Soln-IV,           l



               23:42:                               IV, Once,                                            first dose           



                                                  03/10/16           



                                                  17:42:00           



                                                  CST, stop           



                                                  date           



                                                  03/10/16           



                                                  17:42:00           



                                                  CST            

 

     Misc            No   Deuce                1,000 mL,           [facility]

a



     Medication      3-10           [facility]                Soln-IV,           l



               23:42:                               IV, Once,                                            first dose           



                                                  03/10/16           



                                                  17:42:00           



                                                  CST, stop           



                                                  date           



                                                  03/10/16           



                                                  17:42:00           



                                                  CST            

 

     Misc            No   Deuce                1,000 mL,           [facility]

a



     Medication      3-10           [facility]                Soln-IV,           l



               23:42:                               IV, Once,           [facility]



                                                first dose           



                                                  03/10/16           



                                                  17:42:00           



                                                  CST, stop           



                                                  date           



                                                  03/10/16           



                                                  17:42:00           



                                                  CST            

 

     Misc            No   Deuce                1,000 mL,           [facility]

a



     Medication      3-10           [facility]                Soln-IV,           l



               23:42:                               IV, Once,           [facility]



                                                first dose           



                                                  03/10/16           



                                                  17:42:00           



                                                  CST, stop           



                                                  date           



                                                  03/10/16           



                                                  17:42:00           



                                                  CST            

 

     Misc            No   Deuce                1,000 mL,           [facility]

a



     Medication      3-10           [facility]                Soln-IV,           l



               23:42:                               IV, Once,           [facility]                                 first dose           



                                                  03/10/16           



                                                  17:42:00           



                                                  CST, stop           



                                                  date           



                                                  03/10/16           



                                                  17:42:00           



                                                  CST            

 

     Misc            No   Deuce                1,000 mL,           [facility]

a



     Medication      3-10           [facility]                Soln-IV,           l



               23:42:                               IV, Once,           [facility]



                                                first dose           



                                                  03/10/16           



                                                  17:42:00           



                                                  CST, stop           



                                                  date           



                                                  03/10/16           



                                                  17:42:00           



                                                  CST            

 

     Misc            No   Deuce                1,000 mL,           [facility]

a



     Medication      3-10           [facility]                Soln-IV,           l



               23:42:                               IV, Once,           [facility]                                 first dose           



                                                  03/10/16           



                                                  17:42:00           



                                                  CST, stop           



                                                  date           



                                                  03/10/16           



                                                  17:42:00           



                                                  CST            

 

     fentaNYL            No   Deuce                25 mcg =           [facility]

[facility]



               3-10           [facility]                0.5 mL,           l



               23:20:                               Injection,           [facility]



               00                                 IV, Once,           



                                                  first dose           



                                                  03/10/16           



                                                  17:20:00           



                                                  CST, stop           



                                                  date           



                                                  03/10/16           



                                                  17:20:00           



                                                  CST            

 

     fentaNYL            No   Deuce                25 mcg =           [facility]

[facility]



               3-10           [facility]                0.5 mL,           l



               23:20:                               Injection,           [facility]



               00                                 IV, Once,           



                                                  first dose           



                                                  03/10/16           



                                                  17:20:00           



                                                  CST, stop           



                                                  date           



                                                  03/10/16           



                                                  17:20:00           



                                                  CST            

 

     fentaNYL            No   Deuce                25 mcg =           [facility]

[facility]



               3-10           [facility]                0.5 mL,           l



               23:20:                               Injection,           [facility]



               00                                 IV, Once,           



                                                  first dose           



                                                  03/10/16           



                                                  17:20:00           



                                                  CST, stop           



                                                  date           



                                                  03/10/16           



                                                  17:20:00           



                                                  CST            

 

     fentaNYL      2016-0      No   Deuce                25 mcg =           [facility]

[facility]



               3-10           [facility]                0.5 mL,           l



               23:20:                               Injection,           [facility]



               00                                 IV, Once,           



                                                  first dose           



                                                  03/10/16           



                                                  17:20:00           



                                                  CST, stop           



                                                  date           



                                                  03/10/16           



                                                  17:20:00           



                                                  CST            

 

     fentaNYL      2016-0      No   Deuce                25 mcg =           [facility]

[facility]



               3-10           [facility]                0.5 mL,           l



               23:20:                               Injection,           [facility]



               00                                 IV, Once,           



                                                  first dose           



                                                  03/10/16           



                                                  17:20:00           



                                                  CST, stop           



                                                  date           



                                                  03/10/16           



                                                  17:20:00           



                                                  CST            

 

     fentaNYL      -0      No   Deuce                25 mcg =           [facility]

[facility]



               3-10           [facility]                0.5 mL,           l



               23:20:                               Injection,           [facility]



               00                                 IV, Once,           



                                                  first dose           



                                                  03/10/16           



                                                  17:20:00           



                                                  CST, stop           



                                                  date           



                                                  03/10/16           



                                                  17:20:00           



                                                  CST            

 

     fentaNYL      2016-0      No   Deuce                25 mcg =           [facility]

[facility]



               3-10           [facility]                0.5 mL,           l



               23:20:                               Injection,           [facility]



               00                                 IV, Once,           



                                                  first dose           



                                                  03/10/16           



                                                  17:20:00           



                                                  CST, stop           



                                                  date           



                                                  03/10/16           



                                                  17:20:00           



                                                  CST            

 

     fentaNYL      -0      No   Deuce                25 mcg =           [facility]

[facility]



               3-10           [facility]                0.5 mL,           l



               23:20:                               Injection,           [facility]



               00                                 IV, Once,           



                                                  first dose           



                                                  03/10/16           



                                                  17:20:00           



                                                  CST, stop           



                                                  date           



                                                  03/10/16           



                                                  17:20:00           



                                                  CST            

 

     ondansetron      -0      No   Deuce                4 mg = 2           

[facility]



               3-10           [facility]                mL,            l



               23:03:                               Injection,           [facility]



               00                                 IV, Once,           



                                                  first dose           



                                                  03/10/16           



                                                  17:03:00           



                                                  CST, stop           



                                                  date           



                                                  03/10/16           



                                                  17:03:00           



                                                  CST            

 

     ondansetron      -0      No   Deuce                4 mg = 2           

[facility]



               3-10           [facility]                mL,            l



               23:03:                               Injection,           [facility]



               00                                 IV, Once,           



                                                  first dose           



                                                  03/10/16           



                                                  17:03:00           



                                                  CST, stop           



                                                  date           



                                                  03/10/16           



                                                  17:03:00           



                                                  CST            

 

     ondansetron      -0      No   Deuce                4 mg = 2           

[facility]



               3-10           [facility]                mL,            l



               23:03:                               Injection,           [facility]



               00                                 IV, Once,           



                                                  first dose           



                                                  03/10/16           



                                                  17:03:00           



                                                  CST, stop           



                                                  date           



                                                  03/10/16           



                                                  17:03:00           



                                                  CST            

 

     ondansetron      2016-0      No   Deuce                4 mg = 2           

[facility]



               3-10           [facility]                mL,            l



               23:03:                               Injection,           [facility]



               00                                 IV, Once,           



                                                  first dose           



                                                  03/10/16           



                                                  17:03:00           



                                                  CST, stop           



                                                  date           



                                                  03/10/16           



                                                  17:03:00           



                                                  CST            

 

     ondansetron      -0      No   Deuce                4 mg = 2           

[facility]



               3-10           [facility]                mL,            l



               23:03:                               Injection,           [facility]



               00                                 IV, Once,           



                                                  first dose           



                                                  03/10/16           



                                                  17:03:00           



                                                  CST, stop           



                                                  date           



                                                  03/10/16           



                                                  17:03:00           



                                                  CST            

 

     ondansetron      -0      No   Deuce                4 mg = 2           

[facility]



               3-10           [facility]                mL,            l



               23:03:                               Injection,           [facility]



               00                                 IV, Once,           



                                                  first dose           



                                                  03/10/16           



                                                  17:03:00           



                                                  CST, stop           



                                                  date           



                                                  03/10/16           



                                                  17:03:00           



                                                  CST            

 

     ondansetron      2016-0      No   Deuce                4 mg = 2           

[facility]



               3-10           [facility]                mL,            l



               23:03:                               Injection,           [facility]



               00                                 IV, Once,           



                                                  first dose           



                                                  03/10/16           



                                                  17:03:00           



                                                  CST, stop           



                                                  date           



                                                  03/10/16           



                                                  17:03:00           



                                                  CST            

 

     ondansetron      -0      No   Deuce                4 mg = 2           

[facility]



               3-10           [facility]                mL,            l



               23:03:                               Injection,           [facility]



               00                                 IV, Once,           



                                                  first dose           



                                                  03/10/16           



                                                  17:03:00           



                                                  CST, stop           



                                                  date           



                                                  03/10/16           



                                                  17:03:00           



                                                  CST            

 

     fentaNYL      2016-0      No   Deuce                25 mcg =           [facility]

[facility]



               3-10           [facility]                0.5 mL,           l



               23:00:                               Injection,           [facility]



               00                                 IV, Once,           



                                                  first dose           



                                                  03/10/16           



                                                  17:00:00           



                                                  CST, stop           



                                                  date           



                                                  03/10/16           



                                                  17:00:00           



                                                  CST            

 

     fentaNYL      2016-0      No   Deuce                25 mcg =           [facility]

[facility]



               3-10           Wheat                0.5 mL,           l



               23:00:                               Injection,           [facility]



               00                                 IV, Once,           



                                                  first dose           



                                                  03/10/16           



                                                  17:00:00           



                                                  CST, stop           



                                                  date           



                                                  03/10/16           



                                                  17:00:00           



                                                  CST            

 

     fentaNYL      -0      No   Deuce                25 mcg =           [facility]

[facility]



               3-10           Wheat                0.5 mL,           l



               23:00:                               Injection,           [facility]



               00                                 IV, Once,           



                                                  first dose           



                                                  03/10/16           



                                                  17:00:00           



                                                  CST, stop           



                                                  date           



                                                  03/10/16           



                                                  17:00:00           



                                                  CST            

 

     fentaNYL      2016-0      No   Deuce                25 mcg =           [facility]

[facility]



               3-10           Wheat                0.5 mL,           l



               23:00:                               Injection,           [facility]



               00                                 IV, Once,           



                                                  first dose           



                                                  03/10/16           



                                                  17:00:00           



                                                  CST, stop           



                                                  date           



                                                  03/10/16           



                                                  17:00:00           



                                                  CST            

 

     fentaNYL      2016-0      No   Deuce                25 mcg =           [facility]

[facility]



               3-10           Wheat                0.5 mL,           l



               23:00:                               Injection,           [facility]



               00                                 IV, Once,           



                                                  first dose           



                                                  03/10/16           



                                                  17:00:00           



                                                  CST, stop           



                                                  date           



                                                  03/10/16           



                                                  17:00:00           



                                                  CST            

 

     fentaNYL      2016-0      No   Deuce                25 mcg =           [facility]

[facility]



               3-10           Wheat                0.5 mL,           l



               23:00:                               Injection,           [facility]



               00                                 IV, Once,           



                                                  first dose           



                                                  03/10/16           



                                                  17:00:00           



                                                  CST, stop           



                                                  date           



                                                  03/10/16           



                                                  17:00:00           



                                                  CST            

 

     fentaNYL      2016-0      No   Deuce                25 mcg =           [facility]

[facility]



               3-10           Wheat                0.5 mL,           l



               23:00:                               Injection,           [facility]



               00                                 IV, Once,           



                                                  first dose           



                                                  03/10/16           



                                                  17:00:00           



                                                  CST, stop           



                                                  date           



                                                  03/10/16           



                                                  17:00:00           



                                                  CST            

 

     fentaNYL      2016-0      No   Deuce                25 mcg =           [facility]

[facility]



               3-10           Wheat                0.5 mL,           l



               23:00:                               Injection,           [facility]



               00                                 IV, Once,           



                                                  first dose           



                                                  03/10/16           



                                                  17:00:00           



                                                  CST, stop           



                                                  date           



                                                  03/10/16           



                                                  17:00:00           



                                                  CST            

 

     fentaNYL      2016-0      No   Deuce                25 mcg =           [facility]

[facility]



               3-10           [facility]                0.5 mL,           l



               22:48:                               Injection,           [facility]



               00                                 IV, Once,           



                                                  first dose           



                                                  03/10/16           



                                                  16:48:00           



                                                  CST, stop           



                                                  date           



                                                  03/10/16           



                                                  16:48:00           



                                                  CST            

 

     fentaNYL      2016-0      No   Deuce                25 mcg =           [facility]

[facility]



               3-10           [facility]                0.5 mL,           l



               22:48:                               Injection,           [facility]



               00                                 IV, Once,           



                                                  first dose           



                                                  03/10/16           



                                                  16:48:00           



                                                  CST, stop           



                                                  date           



                                                  03/10/16           



                                                  16:48:00           



                                                  CST            

 

     fentaNYL      2016-0      No   Deuce                25 mcg =           [facility]

[facility]



               3-10           Wheat                0.5 mL,           l



               22:48:                               Injection,           [facility]



               00                                 IV, Once,           



                                                  first dose           



                                                  03/10/16           



                                                  16:48:00           



                                                  CST, stop           



                                                  date           



                                                  03/10/16           



                                                  16:48:00           



                                                  CST            

 

     fentaNYL      -      No   Deuce                25 mcg =           [facility]

[facility]



               3-10           [facility]                0.5 mL,           l



               22:48:                               Injection,           [facility]



               00                                 IV, Once,           



                                                  first dose           



                                                  03/10/16           



                                                  16:48:00           



                                                  CST, stop           



                                                  date           



                                                  03/10/16           



                                                  16:48:00           



                                                  CST            

 

     fentaNYL      -      No   Deuce                25 mcg =           [facility]

[facility]



               3-10           [facility]                0.5 mL,           l



               22:48:                               Injection,           [facility]



               00                                 IV, Once,           



                                                  first dose           



                                                  03/10/16           



                                                  16:48:00           



                                                  CST, stop           



                                                  date           



                                                  03/10/16           



                                                  16:48:00           



                                                  CST            

 

     fentaNYL      -      No   Deuce                25 mcg =           [facility]

[facility]



               3-10           [facility]                0.5 mL,           l



               22:48:                               Injection,           [facility]



               00                                 IV, Once,           



                                                  first dose           



                                                  03/10/16           



                                                  16:48:00           



                                                  CST, stop           



                                                  date           



                                                  03/10/16           



                                                  16:48:00           



                                                  CST            

 

     fentaNYL      -      No   Deuce                25 mcg =           [facility]

[facility]



               3-10           [facility]                0.5 mL,           l



               22:48:                               Injection,           [facility]



               00                                 IV, Once,           



                                                  first dose           



                                                  03/10/16           



                                                  16:48:00           



                                                  CST, stop           



                                                  date           



                                                  03/10/16           



                                                  16:48:00           



                                                  CST            

 

     fentaNYL      -      No   Deuce                25 mcg =           [facility]

[facility]



               3-10           [facility]                0.5 mL,           l



               22:48:                               Injection,           [facility]



               00                                 IV, Once,           



                                                  first dose           



                                                  03/10/16           



                                                  16:48:00           



                                                  CST, stop           



                                                  date           



                                                  03/10/16           



                                                  16:48:00           



                                                  CST            

 

     fentaNYL            No   Deuce                25 mcg =           [facility]

[facility]



               3-10           [facility]                0.5 mL,           l



               22:37:                               Injection,           [facility]



               00                                 IV, Once,           



                                                  first dose           



                                                  03/10/16           



                                                  16:37:00           



                                                  CST, stop           



                                                  date           



                                                  03/10/16           



                                                  16:37:00           



                                                  CST            

 

     fentaNYL      -      No   Deuce                25 mcg =           [facility]

[facility]



               3-10           [facility]                0.5 mL,           l



               22:37:                               Injection,           [facility]



               00                                 IV, Once,           



                                                  first dose           



                                                  03/10/16           



                                                  16:37:00           



                                                  CST, stop           



                                                  date           



                                                  03/10/16           



                                                  16:37:00           



                                                  CST            

 

     fentaNYL      -0      No   Deuce                25 mcg =           [facility]

[facility]



               3-10           [facility]                0.5 mL,           l



               22:37:                               Injection,           [facility]



               00                                 IV, Once,           



                                                  first dose           



                                                  03/10/16           



                                                  16:37:00           



                                                  CST, stop           



                                                  date           



                                                  03/10/16           



                                                  16:37:00           



                                                  CST            

 

     fentaNYL      2016-0      No   Deuce                25 mcg =           [facility]

[facility]



               3-10           [facility]                0.5 mL,           l



               22:37:                               Injection,           [facility]



               00                                 IV, Once,           



                                                  first dose           



                                                  03/10/16           



                                                  16:37:00           



                                                  CST, stop           



                                                  date           



                                                  03/10/16           



                                                  16:37:00           



                                                  CST            

 

     fentaNYL      -0      No   Deuce                25 mcg =           [facility]

[facility]



               3-10           [facility]                0.5 mL,           l



               22:37:                               Injection,           [facility]



               00                                 IV, Once,           



                                                  first dose           



                                                  03/10/16           



                                                  16:37:00           



                                                  CST, stop           



                                                  date           



                                                  03/10/16           



                                                  16:37:00           



                                                  CST            

 

     fentaNYL      2016-0      No   Deuce                25 mcg =           [facility]

[facility]



               3-10           [facility]                0.5 mL,           l



               22:37:                               Injection,           [facility]



               00                                 IV, Once,           



                                                  first dose           



                                                  03/10/16           



                                                  16:37:00           



                                                  CST, stop           



                                                  date           



                                                  03/10/16           



                                                  16:37:00           



                                                  CST            

 

     fentaNYL      -0      No   Deuce                25 mcg =           [facility]

[facility]



               3-10           [facility]                0.5 mL,           l



               22:37:                               Injection,           [facility]



               00                                 IV, Once,           



                                                  first dose           



                                                  03/10/16           



                                                  16:37:00           



                                                  CST, stop           



                                                  date           



                                                  03/10/16           



                                                  16:37:00           



                                                  CST            

 

     fentaNYL      -0      No   Deuce                25 mcg =           [facility]

[facility]



               3-10           [facility]                0.5 mL,           l



               22:37:                               Injection,           [facility]



               00                                 IV, Once,           



                                                  first dose           



                                                  03/10/16           



                                                  16:37:00           



                                                  CST, stop           



                                                  date           



                                                  03/10/16           



                                                  16:37:00           



                                                  CST            

 

     dexamethaso      2016-0      No   [facility]                8 mg = 2           

[facility]



     ne        3-10           [facility]                mL,            l



               22:23:                               Injection,           [facility]



               00                                 IV, Once,           



                                                  first dose           



                                                  03/10/16           



                                                  16:23:00           



                                                  CST, stop           



                                                  date           



                                                  03/10/16           



                                                  16:23:00           



                                                  CST            

 

     dexamethaso      2016-0      No   [facility]                8 mg = 2           

[facility]



     ne        3-10           [facility]                mL,            l



               22:23:                               Injection,           [facility]



               00                                 IV, Once,           



                                                  first dose           



                                                  03/10/16           



                                                  16:23:00           



                                                  CST, stop           



                                                  date           



                                                  03/10/16           



                                                  16:23:00           



                                                  CST            

 

     dexamethaso      2016-0      No   [facility]                8 mg = 2           

[facility]



     ne        3-10           [facility]                mL,            l



               22:23:                               Injection,           [facility]



               00                                 IV, Once,           



                                                  first dose           



                                                  03/10/16           



                                                  16:23:00           



                                                  CST, stop           



                                                  date           



                                                  03/10/16           



                                                  16:23:00           



                                                  CST            

 

     dexamethaso      2016-0      No   [facility]                8 mg = 2           

[facility]



     ne        3-10           [facility]                mL,            l



               22:23:                               Injection,           [facility]



               00                                 IV, Once,           



                                                  first dose           



                                                  03/10/16           



                                                  16:23:00           



                                                  CST, stop           



                                                  date           



                                                  03/10/16           



                                                  16:23:00           



                                                  CST            

 

     dexamethaso      2016-0      No   [facility]                8 mg = 2           

[facility]



     ne        3-10           [facility]                mL,            l



               22:23:                               Injection,           [facility]



               00                                 IV, Once,           



                                                  first dose           



                                                  03/10/16           



                                                  16:23:00           



                                                  CST, stop           



                                                  date           



                                                  03/10/16           



                                                  16:23:00           



                                                  CST            

 

     dexamethaso      2016-0      No   Deuce                8 mg = 2           

[facility]



     ne        3-10           [facility]                mL,            l



               22:23:                               Injection,           [facility]



               00                                 IV, Once,           



                                                  first dose           



                                                  03/10/16           



                                                  16:23:00           



                                                  CST, stop           



                                                  date           



                                                  03/10/16           



                                                  16:23:00           



                                                  CST            

 

     dexamethaso      2016-0      No   Deuce                8 mg = 2           

[facility]



     ne        3-10           [facility]                mL,            l



               22:23:                               Injection,           [facility]



               00                                 IV, Once,           



                                                  first dose           



                                                  03/10/16           



                                                  16:23:00           



                                                  CST, stop           



                                                  date           



                                                  03/10/16           



                                                  16:23:00           



                                                  CST            

 

     dexamethaso      2016-0      No   Deuce                8 mg = 2           

[facility]



     ne        3-10           [facility]                mL,            l



               22:23:                               Injection,           [facility]



               00                                 IV, Once,           



                                                  first dose           



                                                  03/10/16           



                                                  16:23:00           



                                                  CST, stop           



                                                  date           



                                                  03/10/16           



                                                  16:23:00           



                                                  CST            

 

     Misc      2016-0      No   [facility]                1,000 mL,           [facility]

a



     Medication      3-10           [facility]                Soln-IV,           l



               22:21:                               IV, Once,           [facility]



               00                                 first dose           



                                                  03/10/16           



                                                  16:21:00           



                                                  CST, stop           



                                                  date           



                                                  03/10/16           



                                                  16:21:00           



                                                  CST            

 

     Misc      2016-0      No   [facility]uce                1,000 mL,           [facility]

a



     Medication      3-10           [facility]                Soln-IV,           l



               22:21:                               IV, Once,           [facility]



                                                first dose           



                                                  03/10/16           



                                                  16:21:00           



                                                  CST, stop           



                                                  date           



                                                  03/10/16           



                                                  16:21:00           



                                                  CST            

 

     Misc      2016-0      No   [facility]uce                1,000 mL,           [facility]

a



     Medication      3-10           [facility]                Soln-IV,           l



               22:21:                               IV, Once,           [facility]



               00                                 first dose           



                                                  03/10/16           



                                                  16:21:00           



                                                  CST, stop           



                                                  date           



                                                  03/10/16           



                                                  16:21:00           



                                                  CST            

 

     Misc      -0      No   Deuce                1,000 mL,           [facility]

a



     Medication      3-10           [facility]                Soln-IV,           l



               22:21:                               IV, Once,           [facility]



               00                                 first dose           



                                                  03/10/16           



                                                  16:21:00           



                                                  CST, stop           



                                                  date           



                                                  03/10/16           



                                                  16:21:00           



                                                  CST            

 

     Misc      0      No   [facility]uce                1,000 mL,           [facility]

a



     Medication      3-10           [facility]                Soln-IV,           l



               22:21:                               IV, Once,           [facility]



               00                                 first dose           



                                                  03/10/16           



                                                  16:21:00           



                                                  CST, stop           



                                                  date           



                                                  03/10/16           



                                                  16:21:00           



                                                  CST            

 

     Misc      0      No   Deuce                1,000 mL,           [facility]

a



     Medication      3-10           [facility]                Soln-IV,           l



               22:21:                               IV, Once,           [facility]



               00                                 first dose           



                                                  03/10/16           



                                                  16:21:00           



                                                  CST, stop           



                                                  date           



                                                  03/10/16           



                                                  16:21:00           



                                                  CST            

 

     Misc      -0      No   Deuce                1,000 mL,           [facility]

a



     Medication      3-10           [facility]                Soln-IV,           l



               22:21:                               IV, Once,           [facility]



               00                                 first dose           



                                                  03/10/16           



                                                  16:21:00           



                                                  CST, stop           



                                                  date           



                                                  03/10/16           



                                                  16:21:00           



                                                  CST            

 

     Misc      0      No   Deuce                1,000 mL,           [facility]

a



     Medication      3-10           [facility]                Soln-IV,           l



               22:21:                               IV, Once,           [facility]



               00                                 first dose           



                                                  03/10/16           



                                                  16:21:00           



                                                  CST, stop           



                                                  date           



                                                  03/10/16           



                                                  16:21:00           



                                                  CST            

 

     clindamycin      -0      Yes  Deuce                928.125           [facility]

[facility]



               3-10           [facility]                mg,            l



               22:18:                               Soln-IV,           [facility]



               00                                 IV, Once,           



                                                  first dose           



                                                  03/10/16           



                                                  16:18:00           



                                                  CST, stop           



                                                  date           



                                                  03/10/16           



                                                  16:18:00           



                                                  CST            

 

     clindamycin      -0      Yes  Deuce                928.125           [facility]

[facility]



               3-10           [facility]                mg,            l



               22:18:                               Soln-IV,           [facility]



               00                                 IV, Once,           



                                                  first dose           



                                                  03/10/16           



                                                  16:18:00           



                                                  CST, stop           



                                                  date           



                                                  03/10/16           



                                                  16:18:00           



                                                  CST            

 

     clindamycin      -0      Yes  Deuce                928.125           [facility]

[facility]



               3-10           [facility]                mg,            l



               22:18:                               Soln-IV,           [facility]



               00                                 IV, Once,           



                                                  first dose           



                                                  03/10/16           



                                                  16:18:00           



                                                  CST, stop           



                                                  date           



                                                  03/10/16           



                                                  16:18:00           



                                                  CST            

 

     clindamycin      -0      Yes  Deuce                928.125           [facility]

[facility]



               3-10           [facility]                mg,            l



               22:18:                               Soln-IV,           [facility]



               00                                 IV, Once,           



                                                  first dose           



                                                  03/10/16           



                                                  16:18:00           



                                                  CST, stop           



                                                  date           



                                                  03/10/16           



                                                  16:18:00           



                                                  CST            

 

     clindamycin      -0      Yes  Deuce                928.125           [facility]

[facility]



               3-10           [facility]                mg,            l



               22:18:                               Soln-IV,           [facility]



               00                                 IV, Once,           



                                                  first dose           



                                                  03/10/16           



                                                  16:18:00           



                                                  CST, stop           



                                                  date           



                                                  03/10/16           



                                                  16:18:00           



                                                  CST            

 

     clindamycin      -0      Yes  Deuce                928.125           [facility]

[facility]



               3-10           [facility]                mg,            l



               22:18:                               Soln-IV,           [facility]



               00                                 IV, Once,           



                                                  first dose           



                                                  03/10/16           



                                                  16:18:00           



                                                  CST, stop           



                                                  date           



                                                  03/10/16           



                                                  16:18:00           



                                                  CST            

 

     clindamycin      -0      Yes  [place]                928.125           [facility]

[facility]



               3-10           [facility]                mg,            l



               22:18:                               Soln-IV,           [facility]



               00                                 IV, Once,           



                                                  first dose           



                                                  03/10/16           



                                                  16:18:00           



                                                  CST, stop           



                                                  date           



                                                  03/10/16           



                                                  16:18:00           



                                                  CST            

 

     clindamycin      -0      Yes  Deuce                928.125           [facility]

[facility]



               3-10           [facility]                mg,            l



               22:18:                               Soln-IV,           [facility]



               00                                 IV, Once,           



                                                  first dose           



                                                  03/10/16           



                                                  16:18:00           



                                                  CST, stop           



                                                  date           



                                                  03/10/16           



                                                  16:18:00           



                                                  CST            

 

     fentaNYL      -0      No   Deuce                25 mcg =           [facility]

[facility]



               3-10           [facility]                0.5 mL,           l



               22:05:                               Injection,           [facility]



               00                                 IV, Once,           



                                                  first dose           



                                                  03/10/16           



                                                  16:05:00           



                                                  CST, stop           



                                                  date           



                                                  03/10/16           



                                                  16:05:00           



                                                  CST            

 

     midazolam      2016-0      No   Deuce                0.5 mg =           [facility]

[facility]



               3-10           [facility]                0.5 mL,           l



               22:05:                               Injection,           [facility]



               00                                 IV, Once,           



                                                  first dose           



                                                  03/10/16           



                                                  16:05:00           



                                                  CST, stop           



                                                  date           



                                                  03/10/16           



                                                  16:05:00           



                                                  CST            

 

     fentaNYL      2016-0      No   Deuce                25 mcg =           [facility]

[facility]



               3-10           [facility]                0.5 mL,           l



               22:05:                               Injection,           [facility]



               00                                 IV, Once,           



                                                  first dose           



                                                  03/10/16           



                                                  16:05:00           



                                                  CST, stop           



                                                  date           



                                                  03/10/16           



                                                  16:05:00           



                                                  CST            

 

     midazolam      2016-0      No   Deuce                0.5 mg =           [facility]

[facility]



               3-10           [facility]                0.5 mL,           l



               22:05:                               Injection,           [facility]



               00                                 IV, Once,           



                                                  first dose           



                                                  03/10/16           



                                                  16:05:00           



                                                  CST, stop           



                                                  date           



                                                  03/10/16           



                                                  16:05:00           



                                                  CST            

 

     fentaNYL      2016-0      No   Deuce                25 mcg =           [facility]

[facility]



               3-10           [facility]                0.5 mL,           l



               22:05:                               Injection,           [facility]



               00                                 IV, Once,           



                                                  first dose           



                                                  03/10/16           



                                                  16:05:00           



                                                  CST, stop           



                                                  date           



                                                  03/10/16           



                                                  16:05:00           



                                                  CST            

 

     midazolam      2016-0      No   Deuce                0.5 mg =           [facility]

[facility]



               3-10           [facility]                0.5 mL,           l



               22:05:                               Injection,           [facility]



               00                                 IV, Once,           



                                                  first dose           



                                                  03/10/16           



                                                  16:05:00           



                                                  CST, stop           



                                                  date           



                                                  03/10/16           



                                                  16:05:00           



                                                  CST            

 

     fentaNYL      2016-0      No   Deuce                25 mcg =           [facility]

[facility]



               3-10           [facility]                0.5 mL,           l



               22:05:                               Injection,           [facility]



               00                                 IV, Once,           



                                                  first dose           



                                                  03/10/16           



                                                  16:05:00           



                                                  CST, stop           



                                                  date           



                                                  03/10/16           



                                                  16:05:00           



                                                  CST            

 

     midazolam      2016-0      No   Deuce                0.5 mg =           [facility]

[facility]



               3-10           [facility]                0.5 mL,           l



               22:05:                               Injection,           [facility]



               00                                 IV, Once,           



                                                  first dose           



                                                  03/10/16           



                                                  16:05:00           



                                                  CST, stop           



                                                  date           



                                                  03/10/16           



                                                  16:05:00           



                                                  CST            

 

     fentaNYL      2016-0      No   Deuce                25 mcg =           [facility]

[facility]



               3-10           [facility]                0.5 mL,           l



               22:05:                               Injection,           [facility]



               00                                 IV, Once,           



                                                  first dose           



                                                  03/10/16           



                                                  16:05:00           



                                                  CST, stop           



                                                  date           



                                                  03/10/16           



                                                  16:05:00           



                                                  CST            

 

     midazolam      -0      No   Deuce                0.5 mg =           [facility]

[facility]



               3-10           [facility]                0.5 mL,           l



               22:05:                               Injection,           [facility]



               00                                 IV, Once,           



                                                  first dose           



                                                  03/10/16           



                                                  16:05:00           



                                                  CST, stop           



                                                  date           



                                                  03/10/16           



                                                  16:05:00           



                                                  CST            

 

     fentaNYL      -0      No   Deuce                25 mcg =           [facility]

[facility]



               3-10           [facility]                0.5 mL,           l



               22:05:                               Injection,           [facility]



               00                                 IV, Once,           



                                                  first dose           



                                                  03/10/16           



                                                  16:05:00           



                                                  CST, stop           



                                                  date           



                                                  03/10/16           



                                                  16:05:00           



                                                  CST            

 

     midazolam      -0      No   Deuce                0.5 mg =           [facility]

[facility]



               3-10           [facility]                0.5 mL,           l



               22:05:                               Injection,           [facility]



               00                                 IV, Once,           



                                                  first dose           



                                                  03/10/16           



                                                  16:05:00           



                                                  CST, stop           



                                                  date           



                                                  03/10/16           



                                                  16:05:00           



                                                  CST            

 

     fentaNYL      -0      No   Deuce                25 mcg =           [facility]

[facility]



               3-10           [facility]                0.5 mL,           l



               22:05:                               Injection,           [facility]



               00                                 IV, Once,           



                                                  first dose           



                                                  03/10/16           



                                                  16:05:00           



                                                  CST, stop           



                                                  date           



                                                  03/10/16           



                                                  16:05:00           



                                                  CST            

 

     midazolam      -0      No   Deuce                0.5 mg =           [facility]

[facility]



               3-10           [facility]                0.5 mL,           l



               22:05:                               Injection,           [facility]



               00                                 IV, Once,           



                                                  first dose           



                                                  03/10/16           



                                                  16:05:00           



                                                  CST, stop           



                                                  date           



                                                  03/10/16           



                                                  16:05:00           



                                                  CST            

 

     fentaNYL      -0      No   Deuce                25 mcg =           [facility]

[facility]



               3-10           [facility]                0.5 mL,           l



               22:05:                               Injection,           [facility]



               00                                 IV, Once,           



                                                  first dose           



                                                  03/10/16           



                                                  16:05:00           



                                                  CST, stop           



                                                  date           



                                                  03/10/16           



                                                  16:05:00           



                                                  CST            

 

     midazolam      -0      No   Deuce                0.5 mg =           [facility]

[facility]



               3-10           [facility]                0.5 mL,           l



               22:05:                               Injection,           [facility]



               00                                 IV, Once,           



                                                  first dose           



                                                  03/10/16           



                                                  16:05:00           



                                                  CST, stop           



                                                  date           



                                                  03/10/16           



                                                  16:05:00           



                                                  CST            

 

     lidocaine      -0      No   [facility]                3 mL,           [facility]

[facility]



               3-10           [facility]                Injection,           l



               21:58:                               IV, Once,           [facility]



               00                                 first dose           



                                                  03/10/16           



                                                  15:58:00           



                                                  CST, stop           



                                                  date           



                                                  03/10/16           



                                                  15:58:00           



                                                  CST            

 

     propofol      -      No   [facility]uce                120 mg =           [facility]

[facility]



               3-10           [facility]                12 mL,           l



               21:58:                               Emulsion,           [facility]



               00                                 IV, Once,           



                                                  first dose           



                                                  03/10/16           



                                                  15:58:00           



                                                  CST, stop           



                                                  date           



                                                  03/10/16           



                                                  15:58:00           



                                                  CST            

 

     lidocaine      -      No   [facility]uce                3 mL,           [facility]

[facility]



               3-10           [facility]                Injection,           l



               21:58:                               IV, Once,           [facility]



               00                                 first dose           



                                                  03/10/16           



                                                  15:58:00           



                                                  CST, stop           



                                                  date           



                                                  03/10/16           



                                                  15:58:00           



                                                  CST            

 

     propofol      -      No   Deuce                120 mg =           [facility]

[facility]



               3-10           [facility]                12 mL,           l



               21:58:                               Emulsion,           [facility]



               00                                 IV, Once,           



                                                  first dose           



                                                  03/10/16           



                                                  15:58:00           



                                                  CST, stop           



                                                  date           



                                                  03/10/16           



                                                  15:58:00           



                                                  CST            

 

     lidocaine      -      No   [facility]                3 mL,           [facility]

[facility]



               3-10           [facility]                Injection,           l



               21:58:                               IV, Once,           [facility]



               00                                 first dose           



                                                  03/10/16           



                                                  15:58:00           



                                                  CST, stop           



                                                  date           



                                                  03/10/16           



                                                  15:58:00           



                                                  CST            

 

     propofol      -      No   Deuce                120 mg =           [facility]

[facility]



               3-10           [facility]                12 mL,           l



               21:58:                               Emulsion,           [facility]



               00                                 IV, Once,           



                                                  first dose           



                                                  03/10/16           



                                                  15:58:00           



                                                  CST, stop           



                                                  date           



                                                  03/10/16           



                                                  15:58:00           



                                                  CST            

 

     lidocaine      -0      No   [facility]                3 mL,           [facility]

[facility]



               3-10           [facility]                Injection,           l



               21:58:                               IV, Once,           [facility]



               00                                 first dose           



                                                  03/10/16           



                                                  15:58:00           



                                                  CST, stop           



                                                  date           



                                                  03/10/16           



                                                  15:58:00           



                                                  CST            

 

     propofol      -0      No   Deuce                120 mg =           [facility]

[facility]



               3-10           [facility]                12 mL,           l



               21:58:                               Emulsion,           [facility]



               00                                 IV, Once,           



                                                  first dose           



                                                  03/10/16           



                                                  15:58:00           



                                                  CST, stop           



                                                  date           



                                                  03/10/16           



                                                  15:58:00           



                                                  CST            

 

     lidocaine      2016-0      No   [facility]                3 mL,           [facility]

[facility]



               3-10           [facility]                Injection,           l



               21:58:                               IV, Once,           [facility]



               00                                 first dose           



                                                  03/10/16           



                                                  15:58:00           



                                                  CST, stop           



                                                  date           



                                                  03/10/16           



                                                  15:58:00           



                                                  CST            

 

     propofol            No   Deuce                120 mg =           [facility]

[facility]



               3-10           [facility]                12 mL,           l



               21:58:                               Emulsion,           [facility]



               00                                 IV, Once,           



                                                  first dose           



                                                  03/10/16           



                                                  15:58:00           



                                                  CST, stop           



                                                  date           



                                                  03/10/16           



                                                  15:58:00           



                                                  CST            

 

     lidocaine            No   [facility]                3 mL,           [facility]

[facility]



               3-10           [facility]                Injection,           l



               21:58:                               IV, Once,           [facility]



               00                                 first dose           



                                                  03/10/16           



                                                  15:58:00           



                                                  CST, stop           



                                                  date           



                                                  03/10/16           



                                                  15:58:00           



                                                  CST            

 

     propofol            No   Deuce                120 mg =           [facility]

[facility]



               3-10           [facility]                12 mL,           l



               21:58:                               Emulsion,           [facility]



               00                                 IV, Once,           



                                                  first dose           



                                                  03/10/16           



                                                  15:58:00           



                                                  CST, stop           



                                                  date           



                                                  03/10/16           



                                                  15:58:00           



                                                  CST            

 

     lidocaine            No   Deuce                3 mL,           [facility]

[facility]



               3-10           [facility]                Injection,           l



               21:58:                               IV, Once,           [facility]



               00                                 first dose           



                                                  03/10/16           



                                                  15:58:00           



                                                  CST, stop           



                                                  date           



                                                  03/10/16           



                                                  15:58:00           



                                                  CST            

 

     propofol            No   Deuce                120 mg =           [facility]

[facility]



               3-10           [facility]                12 mL,           l



               21:58:                               Emulsion,           [facility]



               00                                 IV, Once,           



                                                  first dose           



                                                  03/10/16           



                                                  15:58:00           



                                                  CST, stop           



                                                  date           



                                                  03/10/16           



                                                  15:58:00           



                                                  CST            

 

     lidocaine            No   Deuec                3 mL,           [facility]

[facility]



               3-10           [facility]                Injection,           l



               21:58:                               IV, Once,           [facility]



               00                                 first dose           



                                                  03/10/16           



                                                  15:58:00           



                                                  CST, stop           



                                                  date           



                                                  03/10/16           



                                                  15:58:00           



                                                  CST            

 

     propofol            No   Deuce                120 mg =           [facility]

[facility]



               3-10           [facility]                12 mL,           l



               21:58:                               Emulsion,           [facility]



               00                                 IV, Once,           



                                                  first dose           



                                                  03/10/16           



                                                  15:58:00           



                                                  CST, stop           



                                                  date           



                                                  03/10/16           



                                                  15:58:00           



                                                  CST            

 

     midazolam            No   Deuce                0.5 mg =           [facility]

[facility]



               3-10           [facility]                0.5 mL,           l



               21:50:                               Injection,           [facility]



               00                                 IV, Once,           



                                                  first dose           



                                                  03/10/16           



                                                  15:50:00           



                                                  CST, stop           



                                                  date           



                                                  03/10/16           



                                                  15:50:00           



                                                  CST            

 

     fentaNYL      -      No   Deuce                25 mcg =           [facility]

[facility]



               3-10           [facility]                0.5 mL,           l



               21:50:                               Injection,           [facility]



               00                                 IV, Once,           



                                                  first dose           



                                                  03/10/16           



                                                  15:50:00           



                                                  CST, stop           



                                                  date           



                                                  03/10/16           



                                                  15:50:00           



                                                  CST            

 

     midazolam            No   Deuce                0.5 mg =           [facility]

[facility]



               3-10           [facility]                0.5 mL,           l



               21:50:                               Injection,           [facility]



               00                                 IV, Once,           



                                                  first dose           



                                                  03/10/16           



                                                  15:50:00           



                                                  CST, stop           



                                                  date           



                                                  03/10/16           



                                                  15:50:00           



                                                  CST            

 

     fentaNYL            No   Deuce                25 mcg =           [facility]

[facility]



               3-10           [facility]                0.5 mL,           l



               21:50:                               Injection,           [facility]



               00                                 IV, Once,           



                                                  first dose           



                                                  03/10/16           



                                                  15:50:00           



                                                  CST, stop           



                                                  date           



                                                  03/10/16           



                                                  15:50:00           



                                                  CST            

 

     midazolam            No   Deuce                0.5 mg =           [facility]

[facility]



               3-10           [facility]                0.5 mL,           l



               21:50:                               Injection,           [facility]



               00                                 IV, Once,           



                                                  first dose           



                                                  03/10/16           



                                                  15:50:00           



                                                  CST, stop           



                                                  date           



                                                  03/10/16           



                                                  15:50:00           



                                                  CST            

 

     fentaNYL            No   Deuce                25 mcg =           [facility]

[facility]



               3-10           [facility]                0.5 mL,           l



               21:50:                               Injection,           [facility]



               00                                 IV, Once,           



                                                  first dose           



                                                  03/10/16           



                                                  15:50:00           



                                                  CST, stop           



                                                  date           



                                                  03/10/16           



                                                  15:50:00           



                                                  CST            

 

     midazolam            No   Deuce                0.5 mg =           [facility]

[facility]



               3-10           [facility]                0.5 mL,           l



               21:50:                               Injection,           [facility]



               00                                 IV, Once,           



                                                  first dose           



                                                  03/10/16           



                                                  15:50:00           



                                                  CST, stop           



                                                  date           



                                                  03/10/16           



                                                  15:50:00           



                                                  CST            

 

     fentaNYL      -      No   Deuce                25 mcg =           [facility]

[facility]



               3-10           [facility]                0.5 mL,           l



               21:50:                               Injection,           [facility]



               00                                 IV, Once,           



                                                  first dose           



                                                  03/10/16           



                                                  15:50:00           



                                                  CST, stop           



                                                  date           



                                                  03/10/16           



                                                  15:50:00           



                                                  CST            

 

     midazolam      0      No   Deuce                0.5 mg =           [facility]

[facility]



               3-10           [facility]                0.5 mL,           l



               21:50:                               Injection,           [facility]



               00                                 IV, Once,           



                                                  first dose           



                                                  03/10/16           



                                                  15:50:00           



                                                  CST, stop           



                                                  date           



                                                  03/10/16           



                                                  15:50:00           



                                                  CST            

 

     fentaNYL      -      No   Deuce                25 mcg =           [facility]

[facility]



               3-10           [facility]                0.5 mL,           l



               21:50:                               Injection,           [facility]



               00                                 IV, Once,           



                                                  first dose           



                                                  03/10/16           



                                                  15:50:00           



                                                  CST, stop           



                                                  date           



                                                  03/10/16           



                                                  15:50:00           



                                                  CST            

 

     midazolam      -      No   Deuce                0.5 mg =           [facility]

[facility]



               3-10           [facility]                0.5 mL,           l



               21:50:                               Injection,           [facility]



               00                                 IV, Once,           



                                                  first dose           



                                                  03/10/16           



                                                  15:50:00           



                                                  CST, stop           



                                                  date           



                                                  03/10/16           



                                                  15:50:00           



                                                  CST            

 

     fentaNYL      -      No   Deuce                25 mcg =           [facility]

[facility]



               3-10           [facility]                0.5 mL,           l



               21:50:                               Injection,           [facility]



               00                                 IV, Once,           



                                                  first dose           



                                                  03/10/16           



                                                  15:50:00           



                                                  CST, stop           



                                                  date           



                                                  03/10/16           



                                                  15:50:00           



                                                  CST            

 

     midazolam            No   Deuce                0.5 mg =           [facility]

[facility]



               3-10           [facility]                0.5 mL,           l



               21:50:                               Injection,           [facility]



               00                                 IV, Once,           



                                                  first dose           



                                                  03/10/16           



                                                  15:50:00           



                                                  CST, stop           



                                                  date           



                                                  03/10/16           



                                                  15:50:00           



                                                  CST            

 

     fentaNYL            No   Deuce                25 mcg =           [facility]

[facility]



               3-10           [facility]                0.5 mL,           l



               21:50:                               Injection,           [facility]



               00                                 IV, Once,           



                                                  first dose           



                                                  03/10/16           



                                                  15:50:00           



                                                  CST, stop           



                                                  date           



                                                  03/10/16           



                                                  15:50:00           



                                                  CST            

 

     midazolam            No   Deuce                0.5 mg =           [facility]

[facility]



               3-10           [facility]                0.5 mL,           l



               21:50:                               Injection,           [facility]



               00                                 IV, Once,           



                                                  first dose           



                                                  03/10/16           



                                                  15:50:00           



                                                  CST, stop           



                                                  date           



                                                  03/10/16           



                                                  15:50:00           



                                                  CST            

 

     fentaNYL            No   Deuce                25 mcg =           [facility]

[facility]



               3-10           [facility]                0.5 mL,           l



               21:50:                               Injection,           [facility]



               00                                 IV, Once,           



                                                  first dose           



                                                  03/10/16           



                                                  15:50:00           



                                                  CST, stop           



                                                  date           



                                                  03/10/16           



                                                  15:50:00           



                                                  CST            

 

     midazolam      2016-0      No   Deuce                0.5 mg =           [facility]

[facility]



               3-10           [facility]                0.5 mL,           l



               21:10:                               Injection,           [facility]



               00                                 IV, Once,           



                                                  first dose           



                                                  03/10/16           



                                                  15:10:00           



                                                  CST, stop           



                                                  date           



                                                  03/10/16           



                                                  15:10:00           



                                                  CST            

 

     fentaNYL      2016-0      No   Deuce                25 mcg =           [facility]

[facility]



               3-10           [facility]                0.5 mL,           l



               21:10:                               Injection,           [facility]



               00                                 IV, Once,           



                                                  first dose           



                                                  03/10/16           



                                                  15:10:00           



                                                  CST, stop           



                                                  date           



                                                  03/10/16           



                                                  15:10:00           



                                                  CST            

 

     midazolam      -0      No   Deuce                0.5 mg =           [facility]

[facility]



               3-10           [facility]                0.5 mL,           l



               21:10:                               Injection,           [facility]



               00                                 IV, Once,           



                                                  first dose           



                                                  03/10/16           



                                                  15:10:00           



                                                  CST, stop           



                                                  date           



                                                  03/10/16           



                                                  15:10:00           



                                                  CST            

 

     fentaNYL      2016-0      No   Deuce                25 mcg =           [facility]

[facility]



               3-10           [facility]                0.5 mL,           l



               21:10:                               Injection,           [facility]



               00                                 IV, Once,           



                                                  first dose           



                                                  03/10/16           



                                                  15:10:00           



                                                  CST, stop           



                                                  date           



                                                  03/10/16           



                                                  15:10:00           



                                                  CST            

 

     midazolam      2016-0      No   Deuce                0.5 mg =           [facility]

[facility]



               3-10           [facility]                0.5 mL,           l



               21:10:                               Injection,           [facility]



               00                                 IV, Once,           



                                                  first dose           



                                                  03/10/16           



                                                  15:10:00           



                                                  CST, stop           



                                                  date           



                                                  03/10/16           



                                                  15:10:00           



                                                  CST            

 

     fentaNYL      2016-0      No   Deuce                25 mcg =           [facility]

[facility]



               3-10           [facility]                0.5 mL,           l



               21:10:                               Injection,           [facility]



               00                                 IV, Once,           



                                                  first dose           



                                                  03/10/16           



                                                  15:10:00           



                                                  CST, stop           



                                                  date           



                                                  03/10/16           



                                                  15:10:00           



                                                  CST            

 

     midazolam      -0      No   Deuce                0.5 mg =           [facility]

[facility]



               3-10           [facility]                0.5 mL,           l



               21:10:                               Injection,           [facility]



               00                                 IV, Once,           



                                                  first dose           



                                                  03/10/16           



                                                  15:10:00           



                                                  CST, stop           



                                                  date           



                                                  03/10/16           



                                                  15:10:00           



                                                  CST            

 

     fentaNYL      2016-0      No   Deuce                25 mcg =           [facility]

[facility]



               3-10           [facility]                0.5 mL,           l



               21:10:                               Injection,           [facility]



               00                                 IV, Once,           



                                                  first dose           



                                                  03/10/16           



                                                  15:10:00           



                                                  CST, stop           



                                                  date           



                                                  03/10/16           



                                                  15:10:00           



                                                  CST            

 

     midazolam      2016-0      No   [facility]uce                0.5 mg =           [facility]

[facility]



               3-10           [facility]                0.5 mL,           l



               21:10:                               Injection,           [facility]



               00                                 IV, Once,           



                                                  first dose           



                                                  03/10/16           



                                                  15:10:00           



                                                  CST, stop           



                                                  date           



                                                  03/10/16           



                                                  15:10:00           



                                                  CST            

 

     fentaNYL      2016-0      No   Deuce                25 mcg =           [facility]

[facility]



               3-10           [facility]                0.5 mL,           l



               21:10:                               Injection,           [facility]



               00                                 IV, Once,           



                                                  first dose           



                                                  03/10/16           



                                                  15:10:00           



                                                  CST, stop           



                                                  date           



                                                  03/10/16           



                                                  15:10:00           



                                                  CST            

 

     midazolam      2016-0      No   Deuce                0.5 mg =           [facility]

[facility]



               3-10           [facility]                0.5 mL,           l



               21:10:                               Injection,           [facility]



               00                                 IV, Once,           



                                                  first dose           



                                                  03/10/16           



                                                  15:10:00           



                                                  CST, stop           



                                                  date           



                                                  03/10/16           



                                                  15:10:00           



                                                  CST            

 

     fentaNYL      2016-0      No   Deuce                25 mcg =           [facility]

[facility]



               3-10           [facility]                0.5 mL,           l



               21:10:                               Injection,           [facility]



               00                                 IV, Once,           



                                                  first dose           



                                                  03/10/16           



                                                  15:10:00           



                                                  CST, stop           



                                                  date           



                                                  03/10/16           



                                                  15:10:00           



                                                  CST            

 

     midazolam      2016-0      No   Deuce                0.5 mg =           [facility]

[facility]



               3-10           [facility]                0.5 mL,           l



               21:10:                               Injection,           [facility]



               00                                 IV, Once,           



                                                  first dose           



                                                  03/10/16           



                                                  15:10:00           



                                                  CST, stop           



                                                  date           



                                                  03/10/16           



                                                  15:10:00           



                                                  CST            

 

     fentaNYL      2016-0      No   Deuce                25 mcg =           [facility]

[facility]



               3-10           [facility]                0.5 mL,           l



               21:10:                               Injection,           [facility]



               00                                 IV, Once,           



                                                  first dose           



                                                  03/10/16           



                                                  15:10:00           



                                                  CST, stop           



                                                  date           



                                                  03/10/16           



                                                  15:10:00           



                                                  CST            

 

     midazolam      2016-0      No   Deuce                0.5 mg =           [facility]

[facility]



               3-10           [facility]                0.5 mL,           l



               21:10:                               Injection,           [facility]



               00                                 IV, Once,           



                                                  first dose           



                                                  03/10/16           



                                                  15:10:00           



                                                  CST, stop           



                                                  date           



                                                  03/10/16           



                                                  15:10:00           



                                                  CST            

 

     fentaNYL      2016-0      No   Deuce                25 mcg =           [facility]

[facility]



               3-10           [facility]                0.5 mL,           l



               21:10:                               Injection,           [facility]



               00                                 IV, Once,           



                                                  first dose           



                                                  03/10/16           



                                                  15:10:00           



                                                  CST, stop           



                                                  date           



                                                  03/10/16           



                                                  15:10:00           



                                                  CST            

 

     fentaNYL      -0      No   Deuce                25 mcg =           [facility]

[facility]



               3-10           Wheat                0.5 mL,           l



               21:05:                               Injection,           [facility]



               00                                 IV, Once,           



                                                  first dose           



                                                  03/10/16           



                                                  15:05:00           



                                                  CST, stop           



                                                  date           



                                                  03/10/16           



                                                  15:05:00           



                                                  CST            

 

     midazolam      -0      No   Deuce                0.5 mg =           [facility]

[facility]



               3-10           Wheat                0.5 mL,           l



               21:05:                               Injection,           [facility]



               00                                 IV, Once,           



                                                  first dose           



                                                  03/10/16           



                                                  15:05:00           



                                                  CST, stop           



                                                  date           



                                                  03/10/16           



                                                  15:05:00           



                                                  CST            

 

     fentaNYL      -0      No   Deuce                25 mcg =           [facility]

[facility]



               3-10           Wheat                0.5 mL,           l



               21:05:                               Injection,           [facility]



               00                                 IV, Once,           



                                                  first dose           



                                                  03/10/16           



                                                  15:05:00           



                                                  CST, stop           



                                                  date           



                                                  03/10/16           



                                                  15:05:00           



                                                  CST            

 

     midazolam      -0      No   Deuce                0.5 mg =           Me

[facility]



               3-10           Wheat                0.5 mL,           l



               21:05:                               Injection,           [facility]



               00                                 IV, Once,           



                                                  first dose           



                                                  03/10/16           



                                                  15:05:00           



                                                  CST, stop           



                                                  date           



                                                  03/10/16           



                                                  15:05:00           



                                                  CST            

 

     fentaNYL      -0      No   Deuce                25 mcg =           [facility]

[facility]



               3-10           Wheat                0.5 mL,           l



               21:05:                               Injection,           [facility]



               00                                 IV, Once,           



                                                  first dose           



                                                  03/10/16           



                                                  15:05:00           



                                                  CST, stop           



                                                  date           



                                                  03/10/16           



                                                  15:05:00           



                                                  CST            

 

     midazolam      -0      No   Deuce                0.5 mg =           [facility]

[facility]



               3-10           Wheat                0.5 mL,           l



               21:05:                               Injection,           [facility]



               00                                 IV, Once,           



                                                  first dose           



                                                  03/10/16           



                                                  15:05:00           



                                                  CST, stop           



                                                  date           



                                                  03/10/16           



                                                  15:05:00           



                                                  CST            

 

     fentaNYL      -0      No   Deuce                25 mcg =           [facility]

[facility]



               3-10           Wheat                0.5 mL,           l



               21:05:                               Injection,           [facility]



               00                                 IV, Once,           



                                                  first dose           



                                                  03/10/16           



                                                  15:05:00           



                                                  CST, stop           



                                                  date           



                                                  03/10/16           



                                                  15:05:00           



                                                  CST            

 

     midazolam      2016-0      No   Deuce                0.5 mg =           [facility]

[facility]



               3-10           Wheat                0.5 mL,           l



               21:05:                               Injection,           [facility]



               00                                 IV, Once,           



                                                  first dose           



                                                  03/10/16           



                                                  15:05:00           



                                                  CST, stop           



                                                  date           



                                                  03/10/16           



                                                  15:05:00           



                                                  CST            

 

     fentaNYL      2016-0      No   Deuce                25 mcg =           [facility]

[facility]



               3-10           Wheat                0.5 mL,           l



               21:05:                               Injection,           [facility]



               00                                 IV, Once,           



                                                  first dose           



                                                  03/10/16           



                                                  15:05:00           



                                                  CST, stop           



                                                  date           



                                                  03/10/16           



                                                  15:05:00           



                                                  CST            

 

     midazolam      2016-0      No   Deuce                0.5 mg =           [facility]

[facility]



               3-10           Wheat                0.5 mL,           l



               21:05:                               Injection,           [facility]



               00                                 IV, Once,           



                                                  first dose           



                                                  03/10/16           



                                                  15:05:00           



                                                  CST, stop           



                                                  date           



                                                  03/10/16           



                                                  15:05:00           



                                                  CST            

 

     fentaNYL      2016-0      No   Deuce                25 mcg =           [facility]

[facility]



               3-10           Wheat                0.5 mL,           l



               21:05:                               Injection,           [facility]



               00                                 IV, Once,           



                                                  first dose           



                                                  03/10/16           



                                                  15:05:00           



                                                  CST, stop           



                                                  date           



                                                  03/10/16           



                                                  15:05:00           



                                                  CST            

 

     midazolam      2016-0      No   Deuce                0.5 mg =           [facility]

[facility]



               3-10           Wheat                0.5 mL,           l



               21:05:                               Injection,           [facility]



               00                                 IV, Once,           



                                                  first dose           



                                                  03/10/16           



                                                  15:05:00           



                                                  CST, stop           



                                                  date           



                                                  03/10/16           



                                                  15:05:00           



                                                  CST            

 

     fentaNYL      -0      No   Educe                25 mcg =           [facility]

[facility]



               3-10           Wheat                0.5 mL,           l



               21:05:                               Injection,           [facility]



               00                                 IV, Once,           



                                                  first dose           



                                                  03/10/16           



                                                  15:05:00           



                                                  CST, stop           



                                                  date           



                                                  03/10/16           



                                                  15:05:00           



                                                  CST            

 

     midazolam      -0      No   Deuce                0.5 mg =           [facility]

[facility]



               3-10           Wheat                0.5 mL,           l



               21:05:                               Injection,           [facility]



               00                                 IV, Once,           



                                                  first dose           



                                                  03/10/16           



                                                  15:05:00           



                                                  CST, stop           



                                                  date           



                                                  03/10/16           



                                                  15:05:00           



                                                  CST            

 

     fentaNYL      2016-0      No   Deuce                25 mcg =           [facility]

[facility]



               3-10           Wheat                0.5 mL,           l



               21:05:                               Injection,           [facility]



               00                                 IV, Once,           



                                                  first dose           



                                                  03/10/16           



                                                  15:05:00           



                                                  CST, stop           



                                                  date           



                                                  03/10/16           



                                                  15:05:00           



                                                  CST            

 

     midazolam      2016-0      No   Deuce                0.5 mg =           [facility]

[facility]



               3-10           Wheat                0.5 mL,           l



               21:05:                               Injection,           [facility]



               00                                 IV, Once,           



                                                  first dose           



                                                  03/10/16           



                                                  15:05:00           



                                                  CST, stop           



                                                  date           



                                                  03/10/16           



                                                  15:05:00           



                                                  CST            

 

     clindamycin      2016-0      No   Yoan                900 mg, IV        

   [facility]



               3-10           Lei                Piggyback,           l



               20:00:                               Once,           [facility]



               00                                 infuse           



                                                  over 30           



                                                  minutes,           



                                                  first dose           



                                                  03/10/16           



                                                  14:00:00           



                                                  CST, stop           



                                                  date           



                                                  03/10/16           



                                                  14:00:00           



                                                  CST,           



                                                  Prophylaxi           



                                                  s              

 

     clindamycin      -0      No   Yoan                900 mg, IV        

   [facility]



               3-10           Lei                Piggyback,           l



               20:00:                               Once,           [facility]



               00                                 infuse           



                                                  over 30           



                                                  minutes,           



                                                  first dose           



                                                  03/10/16           



                                                  14:00:00           



                                                  CST, stop           



                                                  date           



                                                  03/10/16           



                                                  14:00:00           



                                                  CST,           



                                                  Prophylaxi           



                                                  s              

 

     clindamycin      -0      No   Yoan                900 mg, IV        

   [facility]



               3-10           Lei                Piggyback,           l



               20:00:                               Once,           [facility]



               00                                 infuse           



                                                  over 30           



                                                  minutes,           



                                                  first dose           



                                                  03/10/16           



                                                  14:00:00           



                                                  CST, stop           



                                                  date           



                                                  03/10/16           



                                                  14:00:00           



                                                  CST,           



                                                  Prophylaxi           



                                                  s              

 

     clindamycin      -0      No   Yoan                900 mg, IV        

   [facility]



               3-10           Lei                Piggyback,           l



               20:00:                               Once,           [facility]



               00                                 infuse           



                                                  over 30           



                                                  minutes,           



                                                  first dose           



                                                  03/10/16           



                                                  14:00:00           



                                                  CST, stop           



                                                  date           



                                                  03/10/16           



                                                  14:00:00           



                                                  CST,           



                                                  Prophylaxi           



                                                  s              

 

     clindamycin      -0      No   Yoan                900 mg, IV        

   [facility]



               3-10           Lei                Piggyback,           l



               20:00:                               Once,           [facility]



               00                                 infuse           



                                                  over 30           



                                                  minutes,           



                                                  first dose           



                                                  03/10/16           



                                                  14:00:00           



                                                  CST, stop           



                                                  date           



                                                  03/10/16           



                                                  14:00:00           



                                                  CST,           



                                                  Prophylaxi           



                                                  s              

 

     clindamycin      -0      No   Yoan                900 mg, IV        

   [facility]



               3-10           Eli                Piggyback,           l



               20:00:                               Once,           [facility]



               00                                 infuse           



                                                  over 30           



                                                  minutes,           



                                                  first dose           



                                                  03/10/16           



                                                  14:00:00           



                                                  CST, stop           



                                                  date           



                                                  03/10/16           



                                                  14:00:00           



                                                  CST,           



                                                  Prophylaxi           



                                                  s              

 

     clindamycin            No   Yoan                900 mg, IV        

   [facility]



               3-10           Lei                Piggyback,           l



               20:00:                               Once,           [facility]



               00                                 infuse           



                                                  over 30           



                                                  minutes,           



                                                  first dose           



                                                  03/10/16           



                                                  14:00:00           



                                                  CST, stop           



                                                  date           



                                                  03/10/16           



                                                  14:00:00           



                                                  CST,           



                                                  Prophylaxi           



                                                  s              

 

     clindamycin            No   Yoan                900 mg, IV        

   [facility]



               3-10           Lei                Piggyback,           l



               20:00:                               Once,           [facility]



               00                                 infuse           



                                                  over 30           



                                                  minutes,           



                                                  first dose           



                                                  03/10/16           



                                                  14:00:00           



                                                  CST, stop           



                                                  date           



                                                  03/10/16           



                                                  14:00:00           



                                                  CST,           



                                                  Prophylaxi           



                                                  s              

 

     LR 1,000 mL            No   Ghassan K                1,000 mL,          

 [facility]



               3-10           Chun                IV, 30           l



               19:27:                               mL/hr,           [facility]                                 start date           



                                                  03/10/16           



                                                  13:27:00           



                                                  CST            

 

     Lidocaine            No   Ghassan K                0.2 mL,           [facility]

[facility]



     2% 0.2 mL      3-10           Ch[facility]                Injection,           l



     IV Start      19:27:                               Subcutaneo           Her

hernandes



     [[place]]      00                                 us, Once           



                                                  PRN for           



                                                  other (see           



                                                  comment),           



                                                  first dose           



                                                  03/10/16           



                                                  13:27:00           



                                                  CST            

 

     LR 1,000 mL            No   Ghassan K                1,000 mL,          

 [facility]



               3-10           [facility]                IV, 30           l



               19:27:                               mL/hr,           [facility]                                 start date           



                                                  03/10/16           



                                                  13:27:00           



                                                  CST            

 

     Lidocaine            No   Ghassan K                0.2 mL,           [facility]

[facility]



     2% 0.2 mL      3-10           [facility]                Injection,           l



     IV Start      19:27:                               Subcutaneo           Her

hernandes



     [[place]]      00                                 us, Once           



                                                  PRN for           



                                                  other (see           



                                                  comment),           



                                                  first dose           



                                                  03/10/16           



                                                  13:27:00           



                                                  CST            

 

     LR 1,000 mL            No   Ghassan K                1,000 mL,          

 [facility]



               3-10           [facility]                IV, 30           l



               19:27:                               mL/hr,           [facility]                                 start date           



                                                  03/10/16           



                                                  13:27:00           



                                                  CST            

 

     Lidocaine            No   Ghassan K                0.2 mL,           [facility]

[facility]



     2% 0.2 mL      3-10           Ch[facility]                Injection,           l



     IV Start      19:27:                               Subcutaneo           Her

hernandes



     [[place]]      00                                 us, Once           



                                                  PRN for           



                                                  other (see           



                                                  comment),           



                                                  first dose           



                                                  03/10/16           



                                                  13:27:00           



                                                  CST            

 

     LR 1,000 mL            No   Ghassan K                1,000 mL,          

 [facility]



               3-10           [facility]                IV, 30           l



               19:27:                               mL/hr,           [facility]                                 start date           



                                                  03/10/16           



                                                  13:27:00           



                                                  CST            

 

     Lidocaine            No   Ghassan K                0.2 mL,           [facility]

[facility]



     2% 0.2 mL      3-10           Chun                Injection,           l



     IV Start      19:27:                               Subcutaneo           Her

hernandes



     [[place]]      00                                 us, Once           



                                                  PRN for           



                                                  other (see           



                                                  comment),           



                                                  first dose           



                                                  03/10/16           



                                                  13:27:00           



                                                  CST            

 

     LR 1,000 mL            No   Ghassan K                1,000 mL,          

 [facility]



               3-10           [facility]                IV, 30           l



               19:27:                               mL/hr,                                            start date           



                                                  03/10/16           



                                                  13:27:00           



                                                  CST            

 

     Lidocaine            No   Ghassan K                0.2 mL,           [facility]

[facility]



     2% 0.2 mL      3-10           Chun                Injection,           l



     IV Start      19:27:                               Subcutaneo           Her

hernandes



     [[facility]]      00                                 us, Once           



                                                  PRN for           



                                                  other (see           



                                                  comment),           



                                                  first dose           



                                                  03/10/16           



                                                  13:27:00           



                                                  CST            

 

     LR 1,000 mL            No   Ghassan K                1,000 mL,          

 [facility]



               3-10           Chun                IV, 30           l



               19:27:                               mL/hr,                                            start date           



                                                  03/10/16           



                                                  13:27:00           



                                                  CST            

 

     Lidocaine            No   Ghassan K                0.2 mL,           [facility]

[facility]



     2% 0.2 mL      3-10           Chun                Injection,           l



     IV Start      19:27:                               Subcutaneo           Her

hernandes



     [[place]]      00                                 us, Once           



                                                  PRN for           



                                                  other (see           



                                                  comment),           



                                                  first dose           



                                                  03/10/16           



                                                  13:27:00           



                                                  CST            

 

     LR 1,000 mL            No   Ghassan K                1,000 mL,          

 [facility]



               3-10           [facility]un                IV, 30           l



               19:27:                               mL/hr,           [facility]



                                                start date           



                                                  03/10/16           



                                                  13:27:00           



                                                  CST            

 

     Lidocaine            No   Ghassan K                0.2 mL,           [facility]

[facility]



     2% 0.2 mL      3-10           Ch[facility]                Injection,           l



     IV Start      19:27:                               Subcutaneo           Her

hernandes



     [[place]]      00                                 us, Once           



                                                  PRN for           



                                                  other (see           



                                                  comment),           



                                                  first dose           



                                                  03/10/16           



                                                  13:27:00           



                                                  CST            

 

     LR 1,000 mL            No   Ghassan K                1,000 mL,          

 [facility]



               3-10           [facility]                IV, 30           l



               19:27:                               mL/hr,           [facility]



               00                                 start date           



                                                  03/10/16           



                                                  13:27:00           



                                                  CST            

 

     Lidocaine            No   Ghassan K                0.2 mL,           [facility]

[facility]



     2% 0.2 mL      3-10           Ch[facility]                Injection,           l



     IV Start      19:27:                               Subcutaneo           Her

hernandes



     [[place]]      00                                 us, Once           



                                                  PRN for           



                                                  other (see           



                                                  comment),           



                                                  first dose           



                                                  03/10/16           



                                                  13:27:00           



                                                  CST            

 

     Seroquel            Yes                      100 mg,           [facility]

a



               3                               Oral,           l



               14:40:                               Daily, 0           [facility]



               00                                 Refill(s),           



                                                  migraines           

 

     acetaminoph            Yes                      1 tabs,           [facility]

[facility]



     en-HYDROcod      3-09                               Oral,           l



     one 325      14:40:                               q6hr, 0           [facility]



     mg-5 mg      00                                 Refill(s),           



     oral tablet                                         pain           

 

     traMADol 50            Yes                      50 mg = 1           [facility]

[facility]



     mg oral      3-                               tabs,           l



     tablet      14:40:                               Oral,           [facility]



               00                                 q4hr, 0           



                                                  Refill(s),           



                                                  pain           

 

     amLODIPine-            Yes                      1 tabs,           [facility]

[facility]



     atorvastati      3-09                               Oral, qHS,           l



     n 5 mg-40      14:40:                               0              [facility]



     mg oral      00                                 Refill(s),           



     tablet                                         cholestero           



                                                  l/hyperten           



                                                  alexx           

 

     Osteo            Yes                      Oral,           [facility]



     Bi-Flex      3                               Daily, 0           l



               14:40:                               Refill(s),           [facility]



               00                                 supplement           

 

     Nature's            Yes                      1,000 mg =           [facility]

[facility]



     Bounty Red      3-09                               2 caps,           l



     Krill Oil      14:40:                               Oral, BID,           [facility]

[facility]



     500 mg oral      00                                 0              



     capsule                                         Refill(s),           



                                                  supplement           

 

     aspirin 81            Yes                      81 mg = 1           [facility]

[facility]



     mg oral      3-09                               tabs,           l



     tablet      14:40:                               Oral,           [facility]



               00                                 Daily, 0           



                                                  Refill(s),           



                                                  supplement           

 

     Axert 12.5            Yes                      12.5 mg =           [facility]

[facility]



     mg oral      3-09                               1 tabs,           l



     tablet      14:40:                               Oral,           [facility]



               00                                 Once, PRN           



                                                  for            



                                                  migraine           



                                                  headache,           



                                                  may repeat           



                                                  dose once           



                                                  in 2           



                                                  hours, # 6           



                                                  tabs, 0           



                                                  Refill(s),           



                                                  migrainesm           



                                                  ay repeat           



                                                  dose once           



                                                  in 2 hours           

 

     Wellbutrin            Yes                      300 mg,           [facility]

[facility]



     XL        3-                               Oral,           l



               14:40:                               q24hr, 0           [facility]



               00                                 Refill(s),           



                                                  migraines           

 

     Seroquel            Yes                      100 mg,           [facility]

a



               3-                               Oral,           l



               14:40:                               Daily, 0           [facility]



               00                                 Refill(s),           



                                                  migraines           

 

     acetaminoph            Yes                      1 tabs,           [facility]

[facility]



     en-HYDROcod      3                               Oral,           l



     one 325      14:40:                               q6hr, 0           [facility]



     mg-5 mg      00                                 Refill(s),           



     oral tablet                                         pain           

 

     traMADol 50            Yes                      50 mg = 1           [facility]

[facility]



     mg oral      3-                               tabs,           l



     tablet      14:40:                               Oral,           [facility]



               00                                 q4hr, 0           



                                                  Refill(s),           



                                                  pain           

 

     amLODIPine-            Yes                      1 tabs,           [facility]

[facility]



     atorvastati      3-09                               Oral, qHS,           l



     n 5 mg-40      14:40:                               0              [facility]



     mg oral      00                                 Refill(s),           



     tablet                                         cholestero           



                                                  l/hyperten           



                                                  alexx           

 

     Osteo            Yes                      Oral,           [facility]



     Bi-Flex      3-                               Daily, 0           l



               14:40:                               Refill(s),           [facility]



               00                                 supplement           

 

     Nature's            Yes                      1,000 mg =           [facility]

[facility]



     Bounty Red      3-09                               2 caps,           l



     Krill Oil      14:40:                               Oral, BID,           He

[facility]



     500 mg oral      00                                 0              



     capsule                                         Refill(s),           



                                                  supplement           

 

     aspirin 81            Yes                      81 mg = 1           [facility]

[facility]



     mg oral      3-09                               tabs,           l



     tablet      14:40:                               Oral,           [facility]



               00                                 Daily, 0           



                                                  Refill(s),           



                                                  supplement           

 

     Axert 12.5            Yes                      12.5 mg =           [facility]

[facility]



     mg oral      3-09                               1 tabs,           l



     tablet      14:40:                               Oral,           [facility]



               00                                 Once, PRN           



                                                  for            



                                                  migraine           



                                                  headache,           



                                                  may repeat           



                                                  dose once           



                                                  in 2           



                                                  hours, # 6           



                                                  tabs, 0           



                                                  Refill(s),           



                                                  migrainesm           



                                                  ay repeat           



                                                  dose once           



                                                  in 2 hours           

 

     Wellbutrin            Yes                      300 mg,           [facility]

[facility]



     XL        3-09                               Oral,           l



               14:40:                               q24hr, 0           [facility]



               00                                 Refill(s),           



                                                  migraines           

 

     Seroquel            Yes                      100 mg,           [facility]

a



               3-                               Oral,           l



               14:40:                               Daily, 0           [facility]



               00                                 Refill(s),           



                                                  migraines           

 

     acetaminoph            Yes                      1 tabs,           [facility]

[facility]



     en-HYDROcod      3-09                               Oral,           l



     one 325      14:40:                               q6hr, 0           [facility]



     mg-5 mg      00                                 Refill(s),           



     oral tablet                                         pain           

 

     traMADol 50            Yes                      50 mg = 1           [facility]

[facility]



     mg oral      3-09                               tabs,           l



     tablet      14:40:                               Oral,           [facility]



               00                                 q4hr, 0           



                                                  Refill(s),           



                                                  pain           

 

     amLODIPine-            Yes                      1 tabs,           [facility]

[facility]



     atorvastati      3-                               Oral, qHS,           l



     n 5 mg-40      14:40:                               0              [facility]



     mg oral      00                                 Refill(s),           



     tablet                                         cholestero           



                                                  l/hyperten           



                                                  alexx           

 

     Osteo            Yes                      Oral,           [facility]



     Bi-Flex      3-                               Daily, 0           l



               14:40:                               Refill(s),           [facility]



               00                                 supplement           

 

     Nature's            Yes                      1,000 mg =           [facility]

[facility]



     Bounty Red      3-09                               2 caps,           l



     Krill Oil      14:40:                               Oral, BID,           [facility]

[facility]



     500 mg oral      00                                 0              



     capsule                                         Refill(s),           



                                                  supplement           

 

     aspirin 81            Yes                      81 mg = 1           [facility]

[facility]



     mg oral      3-                               tabs,           l



     tablet      14:40:                               Oral,           [facility]



               00                                 Daily, 0           



                                                  Refill(s),           



                                                  supplement           

 

     Axert 12.5            Yes                      12.5 mg =           [facility]

[facility]



     mg oral      3-09                               1 tabs,           l



     tablet      14:40:                               Oral,           [facility]



               00                                 Once, PRN           



                                                  for            



                                                  migraine           



                                                  headache,           



                                                  may repeat           



                                                  dose once           



                                                  in 2           



                                                  hours, # 6           



                                                  tabs, 0           



                                                  Refill(s),           



                                                  migrainesm           



                                                  ay repeat           



                                                  dose once           



                                                  in 2 hours           

 

     Wellbutrin            Yes                      300 mg,           [facility]

[facility]



     XL        3-                               Oral,           l



               14:40:                               q24hr, 0           [facility]



               00                                 Refill(s),           



                                                  migraines           

 

     Seroquel            Yes                      100 mg,           [facility]

[facility]



               3-                               Oral,           l



               14:40:                               Daily, 0           [facility]



               00                                 Refill(s),           



                                                  migraines           

 

     acetaminoph            Yes                      1 tabs,           [facility]

[facility]



     en-HYDROcod      3-                               Oral,           l



     one 325      14:40:                               q6hr, 0           [facility]



     mg-5 mg      00                                 Refill(s),           



     oral tablet                                         pain           

 

     traMADol 50            Yes                      50 mg = 1           [facility]

[facility]



     mg oral      3-09                               tabs,           l



     tablet      14:40:                               Oral,           [facility]



               00                                 q4hr, 0           



                                                  Refill(s),           



                                                  pain           

 

     amLODIPine-            Yes                      1 tabs,           [facility]

[facility]



     atorvastati      3-                               Oral, qHS,           l



     n 5 mg-40      14:40:                               0              [facility]



     mg oral      00                                 Refill(s),           



     tablet                                         cholestero           



                                                  l/hyperten           



                                                  alexx           

 

     Osteo            Yes                      Oral,           [facility]



     Bi-Flex      3-09                               Daily, 0           l



               14:40:                               Refill(s),           [facility]



               00                                 supplement           

 

     Nature's            Yes                      1,000 mg =           [facility]

[facility]



     Bounty Red      3-09                               2 caps,           l



     Krill Oil      14:40:                               Oral, BID,           [facility]

[facility]



     500 mg oral      00                                 0              



     capsule                                         Refill(s),           



                                                  supplement           

 

     aspirin 81            Yes                      81 mg = 1           [facility]

[facility]



     mg oral      3-09                               tabs,           l



     tablet      14:40:                               Oral,           [facility]



               00                                 Daily, 0           



                                                  Refill(s),           



                                                  supplement           

 

     Axert 12.5            Yes                      12.5 mg =           [facility]

[facility]



     mg oral      3-09                               1 tabs,           l



     tablet      14:40:                               Oral,           [facility]



               00                                 Once, PRN           



                                                  for            



                                                  migraine           



                                                  headache,           



                                                  may repeat           



                                                  dose once           



                                                  in 2           



                                                  hours, # 6           



                                                  tabs, 0           



                                                  Refill(s),           



                                                  migrainesm           



                                                  ay repeat           



                                                  dose once           



                                                  in 2 hours           

 

     Wellbutrin            Yes                      300 mg,           [facility]

[facility]



     XL        3-09                               Oral,           l



               14:40:                               q24hr, 0           [facility]



               00                                 Refill(s),           



                                                  migraines           

 

     Seroquel            Yes                      100 mg,           [facility]

[facility]



               3-09                               Oral,           l



               14:40:                               Daily, 0           [facility]



               00                                 Refill(s),           



                                                  migraines           

 

     acetaminoph            Yes                      1 tabs,           [facility]

[facility]



     en-HYDROcod      3-09                               Oral,           l



     one 325      14:40:                               q6hr, 0           [facility]



     mg-5 mg      00                                 Refill(s),           



     oral tablet                                         pain           

 

     traMADol 50            Yes                      50 mg = 1           [facility]

[facility]



     mg oral      3-09                               tabs,           l



     tablet      14:40:                               Oral,           [facility]



               00                                 q4hr, 0           



                                                  Refill(s),           



                                                  pain           

 

     amLODIPine-            Yes                      1 tabs,           [facility]

[facility]



     atorvastati      3-09                               Oral, qHS,           l



     n 5 mg-40      14:40:                               0              [facility]



     mg oral      00                                 Refill(s),           



     tablet                                         cholestero           



                                                  l/hyperten           



                                                  alexx           

 

     Osteo      2016-      Yes                      Oral,           [facility]



     Bi-Flex      3-09                               Daily, 0           l



               14:40:                               Refill(s),           [facility]



               00                                 supplement           

 

     Nature's            Yes                      1,000 mg =           [facility]

[facility]



     Bounty Red      3-09                               2 caps,           l



     Krill Oil      14:40:                               Oral, BID,           [facility]

[facility]



     500 mg oral      00                                 0              



     capsule                                         Refill(s),           



                                                  supplement           

 

     aspirin 81            Yes                      81 mg = 1           [facility]

[facility]



     mg oral      3-09                               tabs,           l



     tablet      14:40:                               Oral,           [facility]



               00                                 Daily, 0           



                                                  Refill(s),           



                                                  supplement           

 

     Axert 12.5            Yes                      12.5 mg =           [facility]

[facility]



     mg oral      3-09                               1 tabs,           l



     tablet      14:40:                               Oral,           [facility]



               00                                 Once, PRN           



                                                  for            



                                                  migraine           



                                                  headache,           



                                                  may repeat           



                                                  dose once           



                                                  in 2           



                                                  hours, # 6           



                                                  tabs, 0           



                                                  Refill(s),           



                                                  migrainesm           



                                                  ay repeat           



                                                  dose once           



                                                  in 2 hours           

 

     Wellbutrin            Yes                      300 mg,           [facility]

[facility]



     XL        3-                               Oral,           l



               14:40:                               q24hr, 0           [facility]



               00                                 Refill(s),           



                                                  migraines           

 

     Seroquel            Yes                      100 mg,           [facility]

[facility]



               3-                               Oral,           l



               14:40:                               Daily, 0           [facility]



               00                                 Refill(s),           



                                                  migraines           

 

     acetaminoph            Yes                      1 tabs,           [facility]

[facility]



     en-HYDROcod      3-09                               Oral,           l



     one 325      14:40:                               q6hr, 0           [facility]



     mg-5 mg      00                                 Refill(s),           



     oral tablet                                         pain           

 

     traMADol 50            Yes                      50 mg = 1           [facility]

[facility]



     mg oral      3-09                               tabs,           l



     tablet      14:40:                               Oral,           [facility]



               00                                 q4hr, 0           



                                                  Refill(s),           



                                                  pain           

 

     amLODIPine-            Yes                      1 tabs,           [facility]

[facility]



     atorvastati      3-09                               Oral, qHS,           l



     n 5 mg-40      14:40:                               0              [facility]



     mg oral      00                                 Refill(s),           



     tablet                                         cholestero           



                                                  l/hyperten           



                                                  alexx           

 

     Osteo            Yes                      Oral,           [facility]



     Bi-Flex      3-                               Daily, 0           l



               14:40:                               Refill(s),           [facility]



               00                                 supplement           

 

     Nature's            Yes                      1,000 mg =           [facility]

[facility]



     Bounty Red      3-09                               2 caps,           l



     Krill Oil      14:40:                               Oral, BID,           [facility]

[facility]



     500 mg oral      00                                 0              



     capsule                                         Refill(s),           



                                                  supplement           

 

     aspirin 81            Yes                      81 mg = 1           [facility]

[facility]



     mg oral      3-09                               tabs,           l



     tablet      14:40:                               Oral,           [facility]



               00                                 Daily, 0           



                                                  Refill(s),           



                                                  supplement           

 

     Axert 12.5            Yes                      12.5 mg =           [facility]

[facility]



     mg oral      3-09                               1 tabs,           l



     tablet      14:40:                               Oral,           [facility]



               00                                 Once, PRN           



                                                  for            



                                                  migraine           



                                                  headache,           



                                                  may repeat           



                                                  dose once           



                                                  in 2           



                                                  hours, # 6           



                                                  tabs, 0           



                                                  Refill(s),           



                                                  migrainesm           



                                                  ay repeat           



                                                  dose once           



                                                  in 2 hours           

 

     Wellbutrin            Yes                      300 mg,           [facility]

[facility]



     XL        3-                               Oral,           l



               14:40:                               q24hr, 0           [facility]



               00                                 Refill(s),           



                                                  migraines           

 

     Seroquel            Yes                      100 mg,           [facility]

[facility]



               3-                               Oral,           l



               14:40:                               Daily, 0           [facility]



               00                                 Refill(s),           



                                                  migraines           

 

     acetaminoph            Yes                      1 tabs,           [facility]

[facility]



     en-HYDROcod      3-09                               Oral,           l



     one 325      14:40:                               q6hr, 0           [facility]



     mg-5 mg      00                                 Refill(s),           



     oral tablet                                         pain           

 

     traMADol 50            Yes                      50 mg = 1           [facility]

[facility]



     mg oral      3-09                               tabs,           l



     tablet      14:40:                               Oral,           [facility]



               00                                 q4hr, 0           



                                                  Refill(s),           



                                                  pain           

 

     amLODIPine-            Yes                      1 tabs,           [facility]

[facility]



     atorvastati      3-09                               Oral, qHS,           l



     n 5 mg-40      14:40:                               0              [facility]



     mg oral      00                                 Refill(s),           



     tablet                                         cholestero           



                                                  l/hyperten           



                                                  alexx           

 

     Osteo            Yes                      Oral,           [facility]



     Bi-Flex      3-09                               Daily, 0           l



               14:40:                               Refill(s),           [facility]



               00                                 supplement           

 

     Nature's            Yes                      1,000 mg =           [facility]

[facility]



     Bounty Red      3-09                               2 caps,           l



     Krill Oil      14:40:                               Oral, BID,           [facility]

[facility]



     500 mg oral      00                                 0              



     capsule                                         Refill(s),           



                                                  supplement           

 

     aspirin 81            Yes                      81 mg = 1           [facility]

[facility]



     mg oral      3-09                               tabs,           l



     tablet      14:40:                               Oral,           [facility]



               00                                 Daily, 0           



                                                  Refill(s),           



                                                  supplement           

 

     Axert 12.5            Yes                      12.5 mg =           [facility]

[facility]



     mg oral      3-09                               1 tabs,           l



     tablet      14:40:                               Oral,           [facility]



               00                                 Once, PRN           



                                                  for            



                                                  migraine           



                                                  headache,           



                                                  may repeat           



                                                  dose once           



                                                  in 2           



                                                  hours, # 6           



                                                  tabs, 0           



                                                  Refill(s),           



                                                  migrainesm           



                                                  ay repeat           



                                                  dose once           



                                                  in 2 hours           

 

     Wellbutrin            Yes                      300 mg,           [facility]

[facility]



     XL        3-                               Oral,           l



               14:40:                               q24hr, 0           [facility]



               00                                 Refill(s),           



                                                  migraines           

 

     Seroquel            Yes                      100 mg,           [facility]

[facility]



               3-                               Oral,           l



               14:40:                               Daily, 0           [facility]



               00                                 Refill(s),           



                                                  migraines           

 

     acetaminoph            Yes                      1 tabs,           [facility]

[facility]



     en-HYDROcod      3-                               Oral,           l



     one 325      14:40:                               q6hr, 0           [facility]



     mg-5 mg      00                                 Refill(s),           



     oral tablet                                         pain           

 

     traMADol 50            Yes                      50 mg = 1           [facility]

[facility]



     mg oral      3-09                               tabs,           l



     tablet      14:40:                               Oral,           [facility]



               00                                 q4hr, 0           



                                                  Refill(s),           



                                                  pain           

 

     amLODIPine-            Yes                      1 tabs,           [facility]

[facility]



     atorvastati      3-09                               Oral, qHS,           l



     n 5 mg-40      14:40:                               0              [facility]



     mg oral      00                                 Refill(s),           



     tablet                                         cholestero           



                                                  l/hyperten           



                                                  alexx           

 

     Osteo            Yes                      Oral,           [facility]



     Bi-Flex      3-09                               Daily, 0           l



               14:40:                               Refill(s),           [facility]



               00                                 supplement           

 

     Nature's            Yes                      1,000 mg =           [facility]

[facility]



     Bounty Red      3-09                               2 caps,           l



     Krill Oil      14:40:                               Oral, BID,           [facility]

[facility]



     500 mg oral      00                                 0              



     capsule                                         Refill(s),           



                                                  supplement           

 

     aspirin 81            Yes                      81 mg = 1           [facility]

[facility]



     mg oral      3-09                               tabs,           l



     tablet      14:40:                               Oral,           [facility]



               00                                 Daily, 0           



                                                  Refill(s),           



                                                  supplement           

 

     Axert 12.5            Yes                      12.5 mg =           [facility]

[facility]



     mg oral      3-09                               1 tabs,           l



     tablet      14:40:                               Oral,           [facility]



               00                                 Once, PRN           



                                                  for            



                                                  migraine           



                                                  headache,           



                                                  may repeat           



                                                  dose once           



                                                  in 2           



                                                  hours, # 6           



                                                  tabs, 0           



                                                  Refill(s),           



                                                  migrainesm           



                                                  ay repeat           



                                                  dose once           



                                                  in 2 hours           

 

     Wellbutrin            Yes                      300 mg,           [facility]

[facility]



     XL        3-09                               Oral,           l



               14:40:                               q24hr, 0           [facility]



               00                                 Refill(s),           



                                                  migraines           







Immunizations







           Ordered Immunization Filled Immunization Date       Status     Commen

ts   Source



           Name       Name                                        

 

           FLUCELVAX QUAD             2022 Completed             [facility]

st



                                 00:00:00                         [facility]

 

           FLUCELVAX QUAD             2022 Completed             [facility]

st



                                 00:00:00                         [facility]

 

           [facility]            [facility]             [facility]



                                 00:00:00                         [facility]            20[facility] Completed             [facility]



                                 00:00:00                         [facility] [facility]

 

           FLUCELVAX QUAD             2021-10-05 Completed             [facility]

st



                                 00:00:00                         [facility]

 

           FLUCELVAX QUAD             2021-10-05 Completed             [facility]

st



                                 00:00:00                         [facility]

 

           FLUCELVAX QUAD             2020 Completed             [facility]

st



                                 00:00:00                         [facility]

 

           FLUCELVAX QUAD             2020 Completed             [facility]

st



                                 00:00:00                         [facility]

 

           Hx influenza            2019-10-23 Completed             [facility]



           vaccine-unspecified<            00:00:00                         



           sup>1</sup>                                             

 

           Hx influenza            2019-10-23 Completed             [facility]



           vaccine-unspecified<            00:00:00                         



           sup>1</sup>                                             

 

           Hx influenza            2019-10-23 Completed             [facility]



           vaccine-unspecified<            00:00:00                         



           sup>1</sup>                                             

 

           Hx influenza            2019-10-23 Completed             [facility]



           vaccine-unspecified<            00:00:00                         



           sup>1</sup>                                             

 

           Hx influenza            2019-10-23 Completed             [facility]



           vaccine-unspecified<            00:00:00                         



           sup>1</sup>                                             

 

           Hx influenza            2019-10-23 Completed             [facility]



           vaccine-unspecified<            00:00:00                         



           sup>1</sup>                                             

 

           Hx influenza            2019-10-23 Completed             [facility]



           vaccine-unspecified<            00:00:00                         



           sup>1</sup>                                             

 

           FLUCELVAX QUAD PF            2019-10-23 Completed             [facility]

[facility]



                                 [facility]:00:00                         [facility]

 

           Hx influenza            2019-10-23 Completed             [facility]



           vaccine-unspecified<            00:00:00                         



           sup>1</sup>                                             

 

           FLUCELVAX QUAD PF            2019-10-23 Completed             [facility]

[facility]



                                 [facility]:00:00                         [facility]

 

           Tdap                  2019 Completed             [facility]



                                 00:00:00                         [facility]

 

           Tdap                  2019 Completed             [facility]



                                 00:00:00                         [facility]

 

           pneumococcal            2019 Completed             [facility]

[facility]



           23-valent             00:00:00                         



           vaccine<sup>3</sup>                                             

 

           pneumococcal            2019 Completed             [facility]

[facility]



           23-valent             00:00:00                         



           vaccine<sup>3</sup>                                             

 

           pneumococcal            2019 Completed             [facility]

[facility]



           23-valent             00:00:00                         



           vaccine<sup>3</sup>                                             

 

           pneumococcal            2019 Completed             [facility]

[facility]



           23-valent             00:00:00                         



           vaccine<sup>3</sup>                                             

 

           pneumococcal            2019 Completed             [facility]

[facility]



           23-valent             00:00:00                         



           vaccine<sup>3</sup>                                             

 

           pneumococcal            2019 Completed             [facility]

[facility]



           23-valent             00:00:00                         



           vaccine<sup>3</sup>                                             

 

           pneumococcal            2019 Completed             [facility]

[facility]



           23-valent             00:00:00                         



           vaccine<sup>3</sup>                                             

 

           pneumococcal            2019 Completed             [facility]

[facility]



           23-valent             00:00:00                         



           vaccine<sup>3</sup>                                             

 

           Hx influenza            2011-10-25 Completed             [facility]

[facility]



           vaccine-unspecified<            14:42:42                         



           sup>1</sup>                                             

 

           Hx influenza            2011-10-25 Completed             [facility]

[facility]



           vaccine-unspecified<            14:42:42                         



           sup>2</sup>                                             

 

           Hx influenza            2011-10-25 Completed             [facility]



           vaccine-unspecified<            14:42:42                         



           sup>1</sup>                                             

 

           Hx influenza            2011-10-25 Completed             [facility]



           vaccine-unspecified<            14:42:42                         



           sup>2</sup>                                             

 

           Hx influenza            2011-10-25 Completed             [facility]



           vaccine-unspecified<            14:42:42                         



           sup>1</sup>                                             

 

           Hx influenza            2011-10-25 Completed             [facility]



           vaccine-unspecified<            14:42:42                         



           sup>2</sup>                                             

 

           Hx influenza            2011-10-25 Completed             [facility]



           vaccine-unspecified<            14:42:42                         



           sup>1</sup>                                             

 

           Hx influenza            2011-10-25 Completed             [facility]



           vaccine-unspecified<            14:42:42                         



           sup>2</sup>                                             

 

           Hx influenza            2011-10-25 Completed             [facility]



           vaccine-unspecified<            14:42:42                         



           sup>1</sup>                                             

 

           Hx influenza            2011-10-25 Completed             [facility]



           vaccine-unspecified<            14:42:42                         



           sup>2</sup>                                             

 

           Hx influenza            2011-10-25 Completed             [facility]



           vaccine-unspecified<            14:42:42                         



           sup>1</sup>                                             

 

           Hx influenza            2011-10-25 Completed             [facility]



           vaccine-unspecified<            14:42:42                         



           sup>2</sup>                                             

 

           Hx influenza            2011-10-25 Completed             [facility]



           vaccine-unspecified<            14:42:42                         



           sup>1</sup>                                             

 

           Hx influenza            2011-10-25 Completed             [facility]

[facility]



           vaccine-unspecified<            14:42:42                         



           sup>2</sup>                                             

 

           Hx influenza            2011-10-25 Completed             [facility]

[facility]



           vaccine-unspecified<            14:42:42                         



           sup>1</sup>                                             

 

           Hx influenza            2011-10-25 Completed             [facility]

[facility]



           vaccine-unspecified<            14:42:42                         



           sup>2</sup>                                             







Vital Signs







             Vital Name   Observation Time Observation Value Comments     Source

 

             WEIGHT       2022 11:28:00 98.2 kg                   

 

             WEIGHT       2022 07:30:00 101 kg                    

 

             HEIGHT       2022 16:00:00 170.2 cm                  

 

             WEIGHT       2022 16:00:00 98.9 kg                   

 

             WEIGHT       2022 11:28:00 98.2 kg                   

 

             WEIGHT       2022 07:30:00 101 kg                    

 

             HEIGHT       2022 16:00:00 170.2 cm                  

 

             WEIGHT       2022 16:00:00 98.9 kg                   

 

             WEIGHT       2022 11:28:00 98.2 kg                   

 

             WEIGHT       2022 07:30:00 101 kg                    

 

             HEIGHT       2022 16:00:00 170.2 cm                  

 

             WEIGHT       2022 16:00:00 98.9 kg                   

 

             Systolic blood 2022 08:05:00 133 mm[Hg]                [facility]

 

             Diastolic blood 2022 08:05:00 87 mm[Hg]                 [facility]

 

             Heart rate   2022 08:05:00 104 /min                  [facility]

 

             Body temperature 2022 08:05:00 35.56 Sherlyn                 [facility]

 

             Respiratory rate 2022 08:05:00 20 /min                   [facility]

 

             Oxygen saturation in 2022 08:05:00 98 /min                   

[facility]



             Arterial blood by                                        [facility]

nter



             Pulse oximetry                                        

 

             Body weight  2022 11:28:00 98.2 kg                   [facility]

 

             BMI          2022 11:28:00 33.91 kg/m2               [facility]

 

             Body height  2022 16:00:00 170.2 cm                  [facility]

 

             Body height  2022 16:22:00 170.2 cm                  [facility]

 

             Body weight  2022 16:22:00 97.523 kg                 [facility]

 

             BMI          2022 16:22:00 33.67 kg/m2               [facility]

 

             Systolic blood 2022 21:24:34 114 mm[Hg]                [facility]



             pressure                                            

 

             Diastolic blood 2022 21:24:34 57 mm[Hg]                 [facility]



             pressure                                            

 

             Heart rate   2022 21:24:34 87 /min                   [facility]

 

             Body temperature 2022 21:24:34 36.61 Sherlyn                 [facility]

 

             Respiratory rate 2022 21:24:34 18 /min                   [facility]

 

             Oxygen saturation in 2022 21:24:34 100 /min                  

[facility]



             Arterial blood by                                        



             Pulse oximetry                                        

 

             Body height  2022 05:00:00 170.2 cm                  [facility]

 

             Body weight  2022 05:00:00 107.2 kg                  [facility]

 

             BMI          2022 05:00:00 37.02 kg/m2               [facility]

 

             Temperature Oral (F) 2022 19:52:00 97.6 F                    

[facility]

 

             Height       2022 19:52:00 170.18 cm                 [facility]

 

             Weight       2022 19:52:00                           [facility]

 

             BMI Calculated 2022 19:52:00                           [facility]

 

             Heart Rate   2021 21:23:00                           [facility]

 

             Systolic (mm Hg) 2021 21:23:00                           [facility]

 

             Diastolic (mm Hg) 2021 21:23:00                           [facility]

 

             Height       2021 21:23:00 165.1 cm                  [facility]

 

             Weight       2021 21:23:00                           [facility]

 

             BMI Calculated 2021 21:23:00                           [facility]

 

             Heart Rate   2021 20:12:00                           [facility]

 

             Heart Rate   2021 20:08:00                           [facility]

 

             Systolic (mm Hg) 2021 20:08:00                           [facility]

 

             Diastolic (mm Hg) 2021 20:08:00                           [facility]

 

             Height       2021 20:08:00 165.1 cm                  [facility]

 

             Weight       2021 20:08:00                           [facility]

 

             BMI Calculated 2021 20:08:00                           [facility]

 

             Systolic (mm Hg) 2020 15:59:00                           [facility]

 

             Diastolic (mm Hg) 2020 15:59:00                           [facility]

 

             Heart Rate   2020 15:59:00                           [facility]

 

             Height       2020 15:59:00 165.1 cm                  [facility]

 

             Weight       2020 15:59:00                           [facility]

 

             BMI Calculated 2020 15:59:00                           [facility]

 

             Systolic (mm Hg) 2020 20:42:00                           [facility]

 

             Diastolic (mm Hg) 2020 20:42:00                           [facility]

 

             Heart Rate   2020 20:42:00                           [facility]

 

             Temperature Oral (F) 2020 20:42:00 98.2 F                    

[facility]

 

             Height       2020 20:42:00 170.18 cm                 [facility]

 

             Weight       2020 20:42:00                           [facility]

 

             BMI Calculated 2020 20:42:00                           [facility]

 

             Systolic (mm Hg) 2019 21:53:00                           [facility]

 

             Diastolic (mm Hg) 2019 21:53:00                           [facility]

 

             Heart Rate   2019 21:53:00                           [facility]

 

             Temperature Oral (F) 2019 21:53:00 97.9 F                    

[facility]

 

             Height       2019 21:53:00 165.1 cm                  [facility]

 

             Weight       2019 21:53:00                           [facility]

 

             BMI Calculated 2019 21:53:00                           [facility]

 

             Height       2019-10-25 14:54:00 165.1 cm                  [facility]

 

             Weight       2019-10-25 14:54:00                           [facility]

 

             BMI Calculated 2019-10-25 14:54:00                           [facility]

 

             Systolic (mm Hg) 2019-10-25 14:54:00                           [facility]

 

             Diastolic (mm Hg) 2019-10-25 14:54:00                           [facility]

 

             Heart Rate   2019-10-25 14:54:00                           [facility]

 

             Temperature Oral (F) 2019-10-25 14:54:00 97.6 F                    

[facility]

 

             Systolic (mm Hg) 2019-10-10 15:37:00                           [facility]

 

             Diastolic (mm Hg) 2019-10-10 15:37:00                           [facility]

 

             Heart Rate   2019-10-10 15:37:00                           [facility]

 

             Temperature Oral (F) 2019-10-10 15:37:00 98.2 F                    

[facility]

 

             Height       2019-10-10 15:37:00 165.1 cm                  [facility]

 

             Weight       2019-10-10 15:37:00                           [facility]

 

             BMI Calculated 2019-10-10 15:37:00                           [facility]

 

             Weight       2019 15:18:00                           [facility]

 

             BMI Calculated 2019 15:18:00                           [facility]

 

             Height       2019 15:18:00 165.1 cm                  [facility]

 

             Temperature Oral (F) 2019 15:18:00 98.4 F                    

[facility]

 

             Heart Rate   2019 15:18:00                           [facility]

 

             Systolic (mm Hg) 2019 15:18:00                           [facility]

 

             Diastolic (mm Hg) 2019 15:18:00                           [facility]

 

             Height       2019 19:16:00 165.1 cm                  [facility]

 

             BMI Calculated 2019 19:16:00                           [facility]

 

             Weight       2019 19:16:00                           [facility]

 

             Heart Rate   2019 19:16:00                           [facility]

 

             Systolic (mm Hg) 2019 19:16:00                           [facility]

 

             Diastolic (mm Hg) 2019 19:16:00                           [facility]

 

             Height       2019 14:26:00 167.01 cm                 [facility]

 

             BMI Calculated 2019 14:26:00                           [facility]

 

             Weight       2019 14:26:00                           [facility]

 

             Heart Rate   2019 14:26:00                           [facility]

 

             Temperature Oral (F) 2019 14:26:00 97.9 F                    

[facility]

 

             Systolic (mm Hg) 2019 14:26:00                           [facility]

 

             Diastolic (mm Hg) 2019 14:26:00                           [facility]

 

             Systolic (mm Hg) 2018 18:33:00                           [facility]

 

             Diastolic (mm Hg) 2018 18:33:00                           [facility]

 

             Heart Rate   2018 18:33:00                           [facility]

 

             Weight       2018 18:33:00                           [facility]

 

             BMI Calculated 2018 18:31:00                           [facility]

 

             Weight       2018 18:31:00                           [facility]

 

             Systolic (mm Hg) 2018 18:31:00                           [facility]

 

             Diastolic (mm Hg) 2018 18:31:00                           [facility]

 

             Height       2018 18:31:00 170.18 cm                 [facility]

 

             Temperature Oral (F) 2018 18:31:00 98.3 F                    

[facility]

 

             Heart Rate   2018 18:31:00                           [facility]

 

             Weight       2018 16:14:00                           [facility]

 

             Height       2018 16:14:00 170.18 cm                 [facility]

 

             BMI Calculated 2018 16:14:00                           [facility]

 

             Heart Rate   2018 16:14:00                           [facility]

 

             Systolic (mm Hg) 2018 16:14:00                           [facility]

 

             Diastolic (mm Hg) 2018 16:14:00                           [facility]

 

             BMI Calculated 2018 15:06:00                           [facility]

 

             Height       2018 15:06:00 170.18 cm                 [facility]

 

             Weight       2018 15:06:00                           [facility]

 

             Systolic (mm Hg) 2018 15:06:00                           [facility]

 

             Diastolic (mm Hg) 2018 15:06:00                           [facility]

 

             Heart Rate   2018 15:06:00                           [facility]

 

             Temperature Oral (F) 2018 15:06:00 97.9 F                    

[facility]

 

             Weight       2017 16:17:00                           [facility]

 

             Height       2017 16:17:00 170.18 cm                 [facility]

 

             BMI Calculated 2017 16:17:00                           [facility]

 

             Respitory Rate 2016 01:25:00                           [facility]

 

             Systolic (mm Hg) 2016 00:50:00                           [facility]

 

             Respitory Rate 2016 00:50:00                           [facility]

 

             Heart Rate   2016 00:50:00                           [facility]

 

             Systolic (mm Hg) 2016 00:40:00                           [facility]

 

             Respitory Rate 2016 00:40:00                           [facility]

 

             Heart Rate   2016 00:40:00                           [facility]

 

             Heart Rate   2016 00:30:00                           [facility]

 

             Systolic (mm Hg) 2016 00:30:00                           [facility]

 

             Temperature Oral (F) 2016-03-10 23:50:00 37.1 Sherlyn                  

[facility]

 

             Height       2016-03-10 19:23:00 169 cm                    [facility]

 

             Weight       2016-03-10 19:23:00                           [facility]

 

             Temperature Oral (F) 2016-03-10 19:23:00 36.6 Sherlyn                  

[facility]

 

             Height       2016 14:13:00 170 cm                    [facility]

 

             Weight       2016 14:13:00                           [facility]







Procedures







                Procedure       Date / Time     Performing Clinician Source



                                Performed                       

 

                ULTRAFILTRATION HD CRRT 2022 18:40:00 Jeanie Canada

 [facility]

 

                ECG 12-LEAD     2022 10:57:31 Unknown, 7 [facility]

 

                ECG 12-LEAD     2022 10:57:31 Unknown, 7 [facility]

 

                NM MYOCARDIAL PERFUSION 2022 10:55:00 Travis Reggieshala YANEZ  [facility]



                PET/CT (REST & STRESS)                                 [facility]

enter

 

                TREADMILL       2022 10:49:30 Unknown, 7 [facility]



                TOLERANCE(NON-NUCLEAR                                 [facility]

nter



                TREADMILL)                                      

 

                ECG 12-LEAD     2022 10:49:10 Unknown, 7 [facility]

 

                ECG 12-LEAD     2022 10:49:10 Unknown, 7 [facility]

 

                CBC W/PLT COUNT & AUTO 2022 03:40:00 [facility]zy [facility]

 

                BASIC METABOLIC PANEL 2022 03:40:00 Jerzy [facility]

 

                MAGNESIUM       2022 03:40:00 Jerzy [facility]

 

                PHOSPHORUS      2022 03:40:00 Jerzy [facility]

 

                CBC W/PLT COUNT & AUTO 2022 03:40:00 David Godwin [facility]

 

                HEPATITIS B SURFACE 2022 08:07:00 Capo Squires  [facility]

 

                HEMODIALYSIS INPATIENT 2022 07:15:26 Bo Corado [facility]

 

                CBC W/PLT COUNT & AUTO 2022 04:40:00 Jerzy [facility]

 

                BASIC METABOLIC PANEL 2022 04:40:00 Jerzy [facility]

 

                MAGNESIUM       2022 04:40:00 Jerzy [facility]

 

                PHOSPHORUS      2022 04:40:00 Jerzy [facility]

 

                HEPATITIS C ANTIBODY 2022 04:40:00 Sanket [facility]

 

                PERIPHERAL BLOOD SMEAR - 2022 04:40:00 Sanket [facility]



                PATHOLOGIST REVIEW                                 [facility]

r

 

                CBC W/PLT COUNT & AUTO 2022 04:40:00 David Godwin [facility]

 

                2D ECHO W/ DOPPLER 2022 15:49:57 Dvaid Godwin [facility]



                (CW/PW/COLOR)                                   [facility]

 

                CBC W/PLT COUNT & AUTO 2022 04:30:00 David Godwin [facility]

 

                CBC W/PLT COUNT & AUTO 2022 04:30:00 Jerzy [facility]

 

                BASIC METABOLIC PANEL 2022 04:30:00 Jerzy [facility]

 

                MAGNESIUM       2022 04:30:00 Jerzy [facility]

 

                PHOSPHORUS      2022 04:30:00 Jerzy [facility]

 

                PROTEIN ELECTROPHORESIS, 2022 04:30:00 Luma Beckham  [facility]

 

                HIGH SENSITIVITY TROPONIN 2022 18:09:00 David Godwin [facility]

 

                POTASSIUM       2022 18:09:00 Jeryz [facility]

 

                XR CHEST 1 VIEW PORTABLE / 2022 15:31:00 David Godwin [facility]

 

                CBC W/PLT COUNT & AUTO 2022 15:15:00 David Godwin [facility]

 

                COMPREHENSIVE METABOLIC 2022 15:15:00 David Godwin [facility]

 

                MAGNESIUM       2022 15:15:00 David Godwin [facility]

 

                PHOSPHORUS      2022 15:15:00 Celli, David [facility] [place]

 

                HIGH SENSITIVITY TROPONIN 2022 15:15:00 CelliDavid [facility]

 

                B-TYPE NATRIURETIC FACTOR 2022 15:15:00 CelliDavid Ivelisse

[facility]



                (BNP)                                           [facility]

 

                CBC W/PLT COUNT & AUTO 2022 15:15:00 [facility]

 

                TSH             2022 15:14:00 CelliDavid [facility] [place]

 

                ECG 12-LEAD     2022 14:50:19 Unknown, Hl7 [facility]

 

                ECG 12-LEAD     2022 14:50:19 Unknown, H[facility]7 [facility]

 

                ECG 12-LEAD     2022 14:50:19 Unknown, Hl7 [facility]

 

                PROTHROMBIN TIME WITH INR 2022 06:57:00 Faye Mitchell   [facility]

 

                COMPREHENSIVE METABOLIC 2022 06:57:00 Faye Mitchell   [facility]



                PANEL                                           

 

                MAGNESIUM LEVEL 2022 06:57:00 Faye Mitchell Ho

spital

 

                PHOSPHORUS LEVEL 2022 06:57:00 Faye Mitchell

ospital

 

                ESTIMATED GFR   2022 06:57:00 Faye Mitchell

spital

 

                TROPONIN T      2022 06:57:00 Faye Mitchell Ho

spital

 

                ZZCOVID-19 ANTI-SPIKE IGG 2022 06:56:00 Faye Mitchell   [facility]



                ANTIBODY TITER                                  

 

                COVID-19 SEROLOGY PATIENT 2022 06:56:00 Faye Mitchell   [facility]



                SURVEILLANCE                                    

 

                HC COMPLETE BLD COUNT 2022 06:56:00 Faye Mitchell

[facility]



                W/AUTO DIFF                                     

 

                COVID-19 QUALITATIVE 2022 04:58:00 Suresh Pineda [facility]



                RT-PCR                                          

 

                TROPONIN T      2022 03:58:00 Faye Mitchell Ho

spital

 

                ECG ED PRELIMINARY 2022 03:37:43 [facility]



                INTERPRETATION                                  

 

                XR CHEST 1 VW PORTABLE 2022 01:31:25 [facility]

 

                COMPREHENSIVE METABOLIC 2022 01:20:00 [facility]



                PANEL                                           

 

                LIPASE LEVEL    2022 01:20:00 [facility]

 

                TROPONIN T      2022 01:20:00 Faye Mitchell 

spital

 

                B NATRIURETIC PEPTIDE 2022 01:20:00 [facility]

 

                HC COMPLETE BLD COUNT 2022 01:20:00 [facility]



                W/AUTO DIFF                                     

 

                ESTIMATED GFR   2022 01:20:00 [facility]

 

                ECG 12-LEAD     2022 01:03:45 Faye Mitchell 

spital

 

                COMPREHENSIVE METABOLIC 2022 16:47:00 [facility]



                PANEL                           Baljinder       

 

                CBC WITH PLATELET AND 2022 16:47:00 [facility]



                DIFFERENTIAL                    Baljinder       

 

                THYROID STIMULATING 2022 16:47:00 [facility]



                HORMONE                         Baljinder       

 

                PARATHYROID HORMONE 2022 16:47:00 [facility]



                                                Baljinder       

 

                VITAMIN D 25 HYDROXY LEVEL 2022 16:47:00 [facility]



                                                Baljinder       

 

                NM BONE SCAN 3 PHASE 2022-10-05 18:11:00 [facility]



                                                Baljinder       

 

                BONE DENSITY    2022-10-05 14:02:48 [facility]

spital



                                                Baljinder       

 

                BONE DENSITY PERIPHERAL 2022-10-05 14:02:48 [facility]



                                                Baljinder       

 

                POC GLUCOSE     2022 04:49:00 Martina Douglass 

spital

 

                CBC HEMOGRAM    2022 10:05:00 Sanket Keene 

spital

 

                BASIC METABOLIC PANEL 2022 10:05:00 [facility]assi[facility]

 

                ESTIMATED GFR   2022 10:05:00 Adriel Sparks Ho

spital



                                                Baljinder       

 

                XR LUMBAR SPINE 2 OR 3 VW 2022 21:10:24 Jassi, Sanket DOWLING.  [facility]

 

                XR THORACIC SPINE 2 VW 2022 21:10:08 [facility], Sanket K.  [facility]

 

                XR CHEST 1 VW PORTABLE 2022 19:05:29 [facility]

 

                BASIC METABOLIC PANEL 2022 09:08:00 [facility]

 

                PHOSPHORUS LEVEL 2022 09:08:00 [facility]

 

                ESTIMATED GFR   2022 09:08:00 [facility]

 

                TTE COMPLETE, WO CONTRAST, 2022 15:40:58 Yue Youngohkun

 [facility]



                W DOPPLER ([id number])                                 

 

                MRI THORACIC SPINE WO 2022 02:11:31 Adriel Sparks

[facility]



                CONTRAST                        Baljinder       

 

                MRI LUMBAR SPINE WO 2022 01:29:36 [facility]



                CONTRAST                        [facility]   

 

                HEPATITIS B CORE ANTIBODY 2022 17:34:00 Francine Fishman

[facility]



                TOTAL                                           

 

                HEPATITIS B SURFACE AB, 2022 17:34:00 FishmanFrancine

[facility]



                QUANTITATIVE                                    

 

                HEPATITIS B SURFACE 2022 17:34:00 [facility]



                ANTIGEN                                         

 

                COVID-19 QUALITATIVE 2022 16:35:00 [facility]



                RT-PCR                          [facility]   

 

                COVID-19 OMICRON VARIANT 2022 16:35:00 [place] [facility]

 [facility]



                QUALITATIVE RT-PCR                 [facility]   

 

                POC GLUCOSE     2022 16:35:00 Martina Douglass Ho

spital

 

                POC GLUCOSE     2022 15:09:00 [facility]   

 

                CT LUMBAR SPINE WO 2022 13:41:51 [facility]



                CONTRAST                        [facility]   

 

                CT RENAL STONE PROTOCOL 2022 13:41:38 [facility]   

 

                HC COMPLETE BLD COUNT 2022 12:44:[address] [facility]



                W/AUTO DIFF                     [facility] 2022 12:44:00 [facility]



                PANEL                           [facility]   

 

                ESTIMATED GFR   2022 12:44:00 [facility]   

 

                RI CRITICAL CARE, E/M 2022 12:28:[address] [facility]



                30-74 MINUTES                   [facility]   

 

                HEPATITIS B SURFACE 2022 15:33:00                 [facility]



                ANTIGEN                                         [facility]

 

                HEPATITIS B SURFACE 2022 15:33:00                 [facility]

[facility]



                ANTIBODY                                        [facility]

 

                XR CHEST 1 VW PORTABLE 2022 13:25:18 Pamela Blanton    [facility] 2022 10:32:00 Luc [facility]



                PANEL                                           

 

                HC COMPLETE BLD COUNT 2022 10:32:00 Shelby Calle [facility]



                W/AUTO DIFF                                     

 

                ESTIMATED GFR   2022 10:32:00 Shelby Calle [facility]

 

                HEMODIALYSIS    2022 05:06:40 Janeen Rosenthal 

ospital



                                                Gabriela        

 

                HEPATITIS B CORE ANTIBODY 2022 20:59:00 Galo [facility]



                TOTAL                           Gabriela        

 

                HEPATITIS B CORE ANTIBODY 2022 20:59:00 Galo [facility]



                IGM                             Gabriela        

 

                HEPATITIS B SURFACE 2022 20:59:00 [place]



                ANTIGEN                         Gabriela        

 

                HEPATITIS B SURFACE AB, 2022 20:58:00 Met Galo

[facility]



                QUANTITATIVE                    Gabriela        

 

                HEMODIALYSIS    2022 15:58:20 [facility]

[facility]



                                                [facility]        

 

                TROPONIN T      2022 [facility]:44:00 [facility]

 

                [facility] CHEST 1 VW PORTABLE 2022 11:41:07 [facility]

 

                TROPONIN T      2022 11:41:00 [facility]

 

                HC COMPLETE BLD COUNT 2022 11:41:00 [facility]



                W/AUTO DIFF                                     

 

                COMPREHENSIVE METABOLIC 2022 11:41:00 [facility]



                PANEL                                           

 

                PROTHROMBIN TIME WITH INR 2022 11:41:00 [facility]

[facility]

 

                PARTIAL THROMBOPLASTIN 2022 11:41:00 [facility]



                TIME (PTT)                                      

 

                B NATRIURETIC PEPTIDE 2022 11:41:00 [facility]

 

                PROCALCITONIN   2022 11:41:00 [facility]

 

                CREATINE KINASE, TOTAL 2022 11:41:00 [facility]



                (CPK)                                           

 

                C-REACTIVE PROTEIN 2022 11:41:00 [facility]

 

                INTERLEUKIN 6   2022 11:41:00 [facility]

 

                FERRITIN LEVEL  2022 11:41:00 [facility]

 

                D-DIMER         2022 11:41:00 [facility]

 

                LDH             2022 11:41:00 [facility]

 

                FIBRINOGEN      2022 11:41:00 [facility]

 

                ESTIMATED GFR   2022 11:41:00 [facility]

 

                RESPIRATORY PATHOGEN PANEL 2022 09:45:00 [facility]



                WITH COVID-19 RT-PCR                 All         

 

                XR CHEST 1 VW   2022 08:05:46 Louann Jaime 

sebastian Carrizales         

 

                BLOOD CULTURE, AEROBIC & 2022 07:49:00 Louann Jaime [facility]



                ANAEROBIC                       All         

 

                HC COMPLETE BLD COUNT 2022 07:49:00 [facility]



                W/AUTO DIFF                     All         

 

                COMPREHENSIVE METABOLIC 2022 07:49:00 Louann Jaime [facility]



                PANEL                           All         

 

                TROPONIN T      2022 07:49:00 Shelby Calle [facility]

 

                B NATRIURETIC PEPTIDE 2022 07:49:[address] [facility] [place] [place]



                                                All         

 

                ESTIMATED GFR   2022 07:49:00 Louann Jaime         

 

                ECG ED PRELIMINARY 2022 07:26:13 Louann Jaime [facility]



                INTERPRETATION                  All         

 

                ECG 12-LEAD     2022 07:20:02 [facility]

sebastian Carrizales         

 

                CT ABDOMEN PELVIS WO 2022 23:38:27 Mahamed [facility]



                CONTRAST                                        

 

                HC COMPLETE BLD COUNT 2022 23:14:00 Mahamed [facility]/AUTO DIFF                                     

 

                COMPREHENSIVE METABOLIC 2022 23:14:00 Mahamed [facility]



                PANEL                                           

 

                LIPASE LEVEL    2022 23:14:00 Mahamed [facility]

 

                ESTIMATED GFR   2022 23:14:00 [facility]

 

                BASIC METABOLIC PANEL 2022 22:54:00 [facility]        

 

                ESTIMATED GFR   2022 22:54:[address] [facility]        

 

                POC GLUCOSE     2022 22:04:00 Trev Neves [facility]

 

                CT ANGIOGRAM PE CHEST 2022 00:23:56 Emilia Bonilla [facility]

 

                IR VERTEBRO LUM UNI OR IVON 2022 19:25:00 Christine Saini [facility]

 

                ECG 12-LEAD     2022 18:05:39 Mariluz Noe [facility]

 

                BASIC METABOLIC PANEL 2022 10:16:00 Edinson [facility]

 

                HC COMPLETE BLD COUNT 2022 10:16:00 Edinson [facility]



                W/AUTO DIFF                                     

 

                PROTHROMBIN TIME WITH INR 2022 10:16:00 [facility]

 

                ESTIMATED GFR   2022 10:16:00 Edinson [facility]

 

                TYPE AND SCREEN 2022 10:16:00 [facility]

 

                TROPONIN T      2022 02:33:00 Edinson [facility]

 

                HEMODIALYSIS    2022 01:59:31 Beth-Cristiano Gallegosist H

ospital



                                                Gabriela        

 

                TTE COMPLETE, WO CONTRAST, 2022 00:15:00 Trev Neves

[facility]



                W DOPPLER ([id number])                                 

 

                TROPONIN T      2022 23:10:00 Edinson [facility]

 

                TROPONIN T      2022 20:19:00 Edinson [facility]

 

                HEMODIALYSIS    2022 13:51:20 Beth-Rosy [facility]ology H

ospital



                                                Gabriela        

 

                HC COMPLETE BLD COUNT 2022 11:10:[address] S[address] [facility]



                W/AUTO DIFF                                     

 

                BASIC METABOLIC PANEL 2022 11:10:00 Parveen [facility]

 

                PARATHYROID HORMONE 2022 11:10:00 ParveenChristine [facility]

 

                VITAMIN D 25 HYDROXY LEVEL 2022 11:10:00 [facility]

 

                DIGOXIN LEVEL   2022 11:10:00 Baramado-Cristiano Gallegosist H

ospital



                                                Gabriela        

 

                PHOSPHORUS LEVEL 2022 11:10:00 BethRosy [facility]



                                                Gabriela        

 

                ESTIMATED GFR   2022 11:10:00 [facility]

 

                HC COMPLETE BLD COUNT 2022 09:45:00 Parveen [facility]



                W/AUTO DIFF                                     

 

                BASIC METABOLIC PANEL 2022 09:45:00 Parveen [facility]

 

                PROTHROMBIN TIME WITH INR 2022 09:45:00 Christine Saini [facility]

 

                ESTIMATED GFR   2022 09:45:00 Parveen [facility]

 

                GASTROINTESTINAL PANEL 2022 22:47:00 Nadia Mendez

[facility]

 

                XR CHEST 1 VW PORTABLE 2022 17:34:26 Parveen [facility]

 

                HC COMPLETE BLD COUNT 2022 10:01:00 Parveen [facility]



                W/AUTO DIFF                                     

 

                BASIC METABOLIC PANEL 2022 10:01:00 Parveen [facility]

 

                ESTIMATED GFR   2022 10:01:00 Parveen [facility]

 

                HEMODIALYSIS    2022 14:21:31 BethCristiano [facility]

[facility]        

 

                HC COMPLETE BLD COUNT 2022 10:11:00 [facility]



                W/AUTO DIFF                                     

 

                COMPREHENSIVE METABOLIC 2022 10:11:00 Parve[address] [facility]



                PANEL                                           

 

                ESTIMATED GFR   2022 10:11:00 [facility]

 

                MRI LUMBAR SPINE WO 2022 02:13:34 Rosalio Angulo    [facility]



                CONTRAST                                        

 

                POTASSIUM LEVEL 2022 18:15:00 [address] [facility]adoCristiano [facility]

ospital



                                                Gabriela        

 

                HEPATITIS B SURFACE 2022 13:46:[address] [facility]



                ANTIGEN                         Gabriela        

 

                HEPATITIS B SURFACE 2022 13:46:[address] [facility] Hospital[facility]adoRosy [facility]



                ANTIBODY                        Gabriela        

 

                TROPONIN T      2022 13:43:00 Rosalio Angulo

spital

 

                HEMODIALYSIS    2022 13:16:08 Janeen Rosenthal H

ospital



                                                Gabriela        

 

                HC COMPLETE BLD COUNT 2022 11:21:00 [facility]



                W/AUTO DIFF                                     

 

                PROTHROMBIN TIME WITH INR 2022 11:21:00 Shaikh [facility]

 

                COMPREHENSIVE METABOLIC 2022 11:21:00 Shaikh [facility]



                PANEL                                           

 

                THYROID STIMULATING 2022 11:21:00 Angulo [facility]



                HORMONE                                         

 

                ESTIMATED GFR   2022 11:21:00 Rosalio Angulo 

spital

 

                ZZCOVID-19 ANTI-SPIKE IGG 2022 06:43:00 [facility]



                ANTIBODY TITER                                  

 

                COVID-19 SEROLOGY PATIENT 2022 06:43:00 Shaikh [facility]



                SURVEILLANCE                                    

 

                TROPONIN T      2022 06:43:00 Rosalio Angulo 

spital

 

                PROCALCITONIN   2022 06:43:00 Rosalio Angulo 

spital

 

                URINE CULTURE   2022 05:18:00 Nadia Mendez 

spital

 

                COVID-19 QUALITATIVE 2022 [facility]:41:00 [facility]



                RT-PCR                                          

 

                URINALYSIS SCREEN AND 2022 04:41:00 [facility]



                MICROSCOPY, WITH REFLEX TO                                 



                CULTURE                                         

 

                CT ABDOMEN PELVIS WO 2022 03:51:11 [facility]



                CONTRAST                                        

 

                [facility]



                [facility]                                        

 

                [facility]



                [facility]                                           

 

                [facility]



                W/AUTO DIFF                                     

 

                ESTIMATED GFR   2022 03:31:00 Nadia Mendez 

spital

 

                XR CHEST 1 VW   2022 03:22:49 Nadia Mendez 

spital

 

                ECG ED PRELIMINARY 2022 02:44:33 Vanessa [facility]



                INTERPRETATION                                  

 

                ECG 12-LEAD     2022 02:40:03 Rosalio Angulo 

spital

 

                HEMODIALYSIS    2022 14:28:03 [place]

[facility]



                                                Gabriela        

 

                BASIC METABOLIC PANEL 2022 03:17:00 Baranowska[place]Daca, [facility]



                                                Gabriela        

 

                ESTIMATED GFR   2022 03:17:00 [facility]



                                                Gabriela        

 

                POC GLUCOSE     2022 02:29:00 Melissa Pickett

[facility]



                                                R               

 

                IR VERTEBRO CERVICAL AND 2022 20:19:56 SolipuraMelissa chavis

[facility]



                THOR UNI OR IVON                 R               

 

                IR VERTEBROPLASTY EA ADDL 2022 20:19:56 Melissa Pickett [facility]



                T OR L                          R               

 

                RI AN ELECTIVE  2022 19:47:00 Juan Jose Templeton [facility]



                ENDOTRACHEAL AIRWAY                                 

 

                HEMODIALYSIS    2022 20:14:38 [place]aca, [facility]        

 

                BLOOD CULTURE, AEROBIC & 2022 20:28:00 Magy Earl [facility]



                ANAEROBIC                                       

 

                URINE CULTURE   2022 23:56:00 [facility]



                                                Gabriela        

 

                CT RENAL STONE PROTOCOL 2022 23:39:33 [facility], [facility]        

 

                URINALYSIS SCREEN AND 2022 22:29:00 Baranowssandee-Daca, [facility]



                MICROSCOPY, WITH REFLEX TO                 Gabriela        



                CULTURE                                         

 

                HEMODIALYSIS    2022 15:08:13 [facility]

[facility]



                                                Gabriela        

 

                HC COMPLETE BLD COUNT 2022 09:55:00 Nieves, [facility]



                W/AUTO DIFF                     R               

 

                BASIC METABOLIC PANEL 2022 09:55:00 Nieves, [facility]



                                                R               

 

                PROTHROMBIN TIME WITH INR 2022 09:55:00 Nieves, Melissa Gtz

[facility]



                                                R               

 

                URIC ACID LEVEL 2022 09:55:00 [place], [facility]

ospital



                                                Gabriela        

 

                DIGOXIN LEVEL   2022 09:55:00 [place], [facility]

ospital



                                                Gabriela        

 

                CORTISOL LEVEL, AM 2022 09:55:00 Baranowska-Daca, [facility]        

 

                HEMOGLOBIN A1C  2022 09:55:00 [place]ska[place]aca, [facility]

ospital



                                                Gabriela        

 

                AMYLASE LEVEL   2022 09:55:00 [place]ska[place], [facility]

ospital



                                                Gabriela        

 

                LIPASE LEVEL    2022 09:55:00 [place]ska[place]aca, [facility]

ospital



                                                Gabriela        

 

                ESTIMATED GFR   2022 09:55:00 [facility]



                                                R               

 

                VITAMIN D 25 HYDROXY LEVEL 2022 09:33:00 Baranowska-Daca, 

[facility]        

 

                PARATHYROID HORMONE 2022 09:33:00 Baranowska[place]Daca, [facility]        

 

                PHOSPHORUS LEVEL 2022 09:33:00 Baranowska-Daca, [facility]        

 

                XR SHOULDER 2+ VW RIGHT 2022 04:05:25 Baran[place]ska[place]aca, [facility]        

 

                HEMODIALYSIS    2022 03:41:52 [place]ska[place], [facility]

ospital



                                                Gabriela        

 

                MRI LUMBAR SPINE WO 2022 15:10:25 Bree Ivan [facility]



                CONTRAST                                        

 

                MRI THORACIC SPINE WO 2022 14:44:08 Bree Ivan [facility]



                CONTRAST                                        

 

                BASIC METABOLIC PANEL 2022 10:43:00 [place]ska[place], [facility]



                                                Gabriela        

 

                ESTIMATED GFR   2022 10:43:00 [facility]

[facility]



                                                Gabriela        

 

                BASIC METABOLIC PANEL 2022 22:26:00 [place]anowska[place]Daca, [facility]



                                                Gabriela        

 

                ESTIMATED GFR   2022 22:26:00 [facility], [facility]



                                                Gabriela        

 

                HEMODIALYSIS    2022 14:03:25 [facility]

[facility]



                                                Gabriela        

 

                HC COMPLETE BLD COUNT 2022 09:43:00 [facility]



                W/AUTO DIFF                     Tajdin          

 

                COMPREHENSIVE METABOLIC 2022 09:43:00 [facility]



                PANEL                           Tajdin          

 

                MAGNESIUM LEVEL 2022 09:43:00 [facility]



                                                Tajdin          

 

                ESTIMATED GFR   2022 09:43:00 [facility]



                                                Tajdin          

 

                TROPONIN T      2022 00:24:00 Tone Irby [facility]

 

                HEPATITIS B SURFACE 2022 00:24:[address] [facility]



                ANTIGEN                         [facility]        

 

                HEPATITIS B SURFACE AB, 2022 00:24:00 Galo [facility]



                QUANTITATIVE                    Gabriela        

 

                HEMODIALYSIS    2022 22:22:37 [facility]S[facility], [facility]



                                                Gabriela        

 

                THYROID STIMULATING 2022 21:32:00 [facility]



                HORMONE                                         

 

                T4, FREE        2022 21:32:00 Esther Biggs 

spital

 

                TROPONIN T      2022 21:32:00 Esther Biggs Ho

spital

 

                COVID-19 QUALITATIVE 2022 21:22:00 [facility]



                RT-PCR                                          

 

                CT LUMBAR SPINE WO 2022 20:55:41 Ray, [facility]



                CONTRAST                                        

 

                CT THORACIC SPINE WO 2022 20:53:28 Ray, [facility]



                CONTRAST                                        

 

                CT ANGIOGRAM PE CHEST 2022 20:45:21 Ray, [facility]

 

                ECG 12-LEAD     2022 19:35:11 Ray, [facility]

 

                XR THORACIC SPINE 2 VW 2022 19:17:36 Ray, [facility]

 

                HC COMPLETE BLD COUNT 2022 18:50:00 Ray, [facility]



                W/AUTO DIFF                                     

 

                COMPREHENSIVE METABOLIC 2022 18:50:00 Ray, [facility]



                PANEL                                           

 

                TROPONIN T      2022 18:50:00 Ray, [facility]

 

                ESTIMATED GFR   2022 18:50:00 Ray, [facility]

 

                LIPASE LEVEL    2022 18:50:00 Ray, [facility]

 

                ECG ED PRELIMINARY 2022 18:18:35 Ray, [facility]



                INTERPRETATION                                  

 

                XR CHEST 2 VW   2022 16:02:54 [facility]

 

                Diabetic retinal eye 2020 06:00:00                 Dillan Mart



                exam<sup>1</sup>                                 

 

                Mammogram       2017-07-15 05:00:00                 Doreen Her hernandes

 

                Colonoscopy<sup>2</sup> 2017 06:00:00                 Hakeem

[facility]

 

                OPEN REDUCTION INTERNAL 2016-03-10 22:13:00 Yoan Lei

[facility]



                FIXATION RADIUS                                 



                INTRA-ARTICULAR W/3                                 



                FRAGMENTS [id number]                                 



                (Right)<sup>1</sup>                                 

 

                Repair of       2016-03-10 06:00:00                 Doreen hernandes



                wrist<sup>3</sup>                                 

 

                skin cancer removed from 2012 00:00:00                 [facility]



                arm                                             

 

                Cholecystectomy                                 [facility]

 

                Procedure<sup>2</sup>                                 [facility]

ermann

 

                Tubal ligation                                  [facility]

 

                Eye operation                                   [facility]

 

                Biopsy of breast                                 Doreen Hines

n

 

                bilateral radial                                 Doreen Hines

n



                kerototomy                                      

 

                bilateral tubal ligation                                 Dillan Mart

 

                colonoscopy                                     [facility]

 

                Skin cancer of                                  [facility]



                arm<sup>4</sup>                                 







Plan of Care







             Planned Activity Planned Date Details      Comments     Source

 

             Future Scheduled 2029   DTAP/TDAP/TD VACCINES              [facility]

[facility]



             Test         00:00:00     (2 - Td or Tdap) [code              Medic

al Center



                                       = DTAP/TDAP/TD              



                                       VACCINES (2 - Td or              



                                       Tdap)]                    

 

             Future Scheduled 2029   DTAP/TDAP/TD VACCINES              [facility]

[facility]



             Test         00:00:00     (2 - Td or Tdap) [code              Medic

al Center



                                       = DTAP/TDAP/TD              



                                       VACCINES (2 - Td or              



                                       Tdap)]                    

 

             Future Scheduled 2029   DTAP/TDAP/TD VACCINES              [facility]

[facility]



             Test         00:00:00     (2 - Td or Tdap) [code              Medic

al Center



                                       = DTAP/TDAP/TD              



                                       VACCINES (2 - Td or              



                                       Tdap)]                    

 

             Future Scheduled 2029   DTAP/TDAP/TD VACCINES              [facility]

[facility]



             Test         00:00:00     (2 - Td or Tdap) [code              Medic

al Center



                                       = DTAP/TDAP/TD              



                                       VACCINES (2 - Td or              



                                       Tdap)]                    

 

             Future Scheduled 2023-01-10   COVID-19 VACCINE (#1)              [facility]



             Test         12:01:44     [code = COVID-19              



                                       VACCINE (#1)]              

 

             Future Scheduled 2023-01-10   65+ PNEUMOCOCCAL              [facility]



             Test         12:01:44     VACCINE (1 - PCV)              



                                       [code = 65+               



                                       PNEUMOCOCCAL VACCINE              



                                       (1 - PCV)]                

 

             Future Scheduled 2023-01-10   SHINGLES VACCINES (1              Met

[facility]



             Test         12:01:44     of 2) [code = SHINGLES              



                                       VACCINES (1 of 2)]              

 

             Future Scheduled 2023-01-10   COLONOSCOPY SCREENING              [facility]



             Test         12:01:44     [code = COLONOSCOPY              



                                       SCREENING]                

 

             Future Scheduled 2023-01-10   BREAST CANCER              [facility]



             Test         12:01:44     SCREENING [code =              



                                       BREAST CANCER              



                                       SCREENING]                

 

             Future Scheduled 2023   DEPRESSION SCREENING              [facility]



             Test         00:00:00     (12+) [code =              Medical Center



                                       DEPRESSION SCREENING              



                                       (12+)]                    

 

             Future Scheduled 2023   FALLS RISK SCREENING              [facility]



             Test         00:00:00     [code = FALLS RISK              Medical C

enter



                                       SCREENING]                

 

             Future Scheduled 2022   HEPATITIS B VACCINES              Met

[facility]



             Test         11:07:43     (1 of 3 - 3-dose              



                                       series) [code =              



                                       HEPATITIS B VACCINES              



                                       (1 of 3 - 3-dose              



                                       series)]                  

 

             Future Scheduled 2022   COVID-19 VACCINE (#1)              [facility]



             Test         11:07:43     [code = COVID-19              



                                       VACCINE (#1)]              

 

             Future Scheduled 2022   65+ PNEUMOCOCCAL              [facility]



             Test         11:07:43     VACCINE (1 - PCV)              



                                       [code = 65+               



                                       PNEUMOCOCCAL VACCINE              



                                       (1 - PCV)]                

 

             Future Scheduled 2022   SHINGLES VACCINES (1              Met

[facility]



             Test         11:07:43     of 2) [code = SHINGLES              



                                       VACCINES (1 of 2)]              

 

             Future Scheduled 2022   Screening for              [facility]



             Test         11:07:43     malignant neoplasm of              



                                       cervix (procedure)              



                                       [code = [id number]]              

 

             Future Scheduled 2022   COLONOSCOPY SCREENING              [facility]



             Test         11:07:43     [code = COLONOSCOPY              



                                       SCREENING]                

 

             Future Scheduled 2022   BREAST CANCER              [facility]



             Test         11:07:43     SCREENING [code =              



                                       BREAST CANCER              



                                       SCREENING]                

 

             Future Scheduled 2022   MEDICARE ANNUAL              [facility]



             Test         00:00:00     WELLNESS (YEAR 2 or              Medical 

Center



                                       FIRST YEAR if no IPPE)              



                                       [code = MEDICARE              



                                       ANNUAL WELLNESS (YEAR              



                                       2 or FIRST YEAR if no              



                                       IPPE)]                    

 

             Future Scheduled 2022   MEDICARE ANNUAL              [facility]



             Test         00:00:00     WELLNESS (YEAR 2 or              Medical 

Center



                                       FIRST YEAR if no IPPE)              



                                       [code = MEDICARE              



                                       ANNUAL WELLNESS (YEAR              



                                       2 or FIRST YEAR if no              



                                       IPPE)]                    

 

             Future Scheduled 2022   MEDICARE ANNUAL              [facility]



             Test         00:00:00     WELLNESS (YEAR 2 or              Medical 

Center



                                       FIRST YEAR if no IPPE)              



                                       [code = MEDICARE              



                                       ANNUAL WELLNESS (YEAR              



                                       2 or FIRST YEAR if no              



                                       IPPE)]                    

 

             Future Scheduled 2022   MEDICARE ANNUAL              [facility]



             Test         00:00:00     WELLNESS (YEAR 2 or              Medical 

Center



                                       FIRST YEAR if no IPPE)              



                                       [code = MEDICARE              



                                       ANNUAL WELLNESS (YEAR              



                                       2 or FIRST YEAR if no              



                                       IPPE)]                    

 

             Future Scheduled 2022   DEPRESSION SCREENING              [facility]



             Test         00:00:00     (12+) [code =              Medical Center



                                       DEPRESSION SCREENING              



                                       (12+)]                    

 

             Future Scheduled 2022   FALLS RISK SCREENING              [facility]



             Test         00:00:00     [code = FALLS RISK              Medical C

enter



                                       SCREENING]                

 

             Future Scheduled 2022   DEPRESSION SCREENING              [facility]



             Test         00:00:00     (12+) [code =              Medical Center



                                       DEPRESSION SCREENING              



                                       (12+)]                    

 

             Future Scheduled 2022   FALLS RISK SCREENING              [facility]



             Test         00:00:00     [code = FALLS RISK              Medical C

enter



                                       SCREENING]                

 

             Future Scheduled 2022   DEPRESSION SCREENING              [facility]



             Test         00:00:00     (12+) [code =              Medical Center



                                       DEPRESSION SCREENING              



                                       (12+)]                    

 

             Future Scheduled 2022   FALLS RISK SCREENING              [facility]



             Test         00:00:00     [code = FALLS RISK              Medical C

enter



                                       SCREENING]                

 

             Future Scheduled 2021   PNEUMOCOCCAL 65+ YRS              [facility]



             Test         00:00:00     (1 - PCV) [code =              Medical Ce

nter



                                       PNEUMOCOCCAL 65+ YRS              



                                       (1 - PCV)]                

 

             Future Scheduled 2021   PNEUMOCOCCAL 65+ YRS              [facility]



             Test         00:00:00     (1 - PCV) [code =              Medical Ce

nter



                                       PNEUMOCOCCAL 65+ YRS              



                                       (1 - PCV)]                

 

             Future Scheduled 2021   PNEUMOCOCCAL 65+ YRS              [facility]



             Test         00:00:00     (1 - PCV) [code =              Medical Ce

nter



                                       PNEUMOCOCCAL 65+ YRS              



                                       (1 - PCV)]                

 

             Future Scheduled 2021   PNEUMOCOCCAL 65+ YRS              [facility]



             Test         00:00:00     (1 - PCV) [code =              Medical Ce

nter



                                       PNEUMOCOCCAL 65+ YRS              



                                       (1 - PCV)]                

 

             Future Scheduled 2006   SHINGLES VACCINES (1              [facility]



             Test         00:00:00     of 2) [code = SHINGLES              Medic

al Center



                                       VACCINES (1 of 2)]              

 

             Future Scheduled 2006   SHINGLES VACCINES (1              [facility]



             Test         00:00:00     of 2) [code = SHINGLES              Medic

al Center



                                       VACCINES (1 of 2)]              

 

             Future Scheduled 2006   SHINGLES VACCINES (1              [facility]



             Test         00:00:00     of 2) [code = SHINGLES              Medic

al Center



                                       VACCINES (1 of 2)]              

 

             Future Scheduled 2006   SHINGLES VACCINES (1              [facility]



             Test         00:00:00     of 2) [code = SHINGLES              Medic

al Center



                                       VACCINES (1 of 2)]              

 

             Future Scheduled 1968   Tobacco Cessation              [facility]



             Test         00:00:00     [facility]sel[facility] and              [facility]

r



                                       Screening (12+) [code              



                                       = Tobacco Cessation              



                                       Counseling and              



                                       Screening (12+)]              

 

             Future Scheduled 1968   Tobacco Cessation              [facility]



             Test         00:00:00     Counseling and              [facility] Cente

r



                                       Screening (12+) [code              



                                       = Tobacco Cessation              



                                       Counseling and              



                                       Screening (12+)]              

 

             Future Scheduled 1968   Tobacco Cessation              [facility]



             Test         00:00:00     Counseling and              Medical Cente

r



                                       Screening (12+) [code              



                                       = Tobacco Cessation              



                                       Counseling and              



                                       Screening (12+)]              

 

             Future Scheduled 1968   Tobacco Cessation              [facility]



             Test         00:00:00     Counseling and              Medical Cente

r



                                       Screening (12+) [code              



                                       = Tobacco Cessation              



                                       Counseling and              



                                       Screening (12+)]              

 

             Future Scheduled 1957   COVID-19 VACCINE (#1)              [facility]



             Test         00:00:00     [code = COVID-19              [facility]

ter



                                       VACCINE (#1)]              

 

             Future Scheduled 1957   COVID-19 VACCINE (#1)              [facility]



             Test         00:00:00     [code = COVID-19              [facility]

ter



                                       VACCINE (#1)]              

 

             Future Scheduled 1957   COVID-19 VACCINE (#1)              [facility]



             Test         00:00:00     [code = COVID-19              [facility]

ter



                                       VACCINE (#1)]              

 

             Future Scheduled 1957   COVID-19 VACCINE (#1)              [facility]



             Test         00:00:00     [code = COVID-19              [facility]

ter



                                       VACCINE (#1)]              

 

             Future Scheduled 1956   Screening for              [facility]

[facility]



             Test         00:00:00     malignant neoplasm of              [facility]

[facility]



                                       breast (procedure)              



                                       [code = 221918157]              

 

             Future Scheduled 1956   CT Colonography              [facility]



             Test         00:00:00     (combo) [code = CT              [facility]

enter



                                       Colonography (combo)]              

 

             Future Scheduled 1956   Screening for              [facility]

es



             Test         00:00:00     malignant neoplasm of              [facility]

[facility] Center



                                       colon (procedure)              



                                       [code = 975486066]              

 

             Future Scheduled 1956   Screening for              [facility]

es



             Test         00:00:00     malignant neoplasm of              [facility]

[facility]



                                       colon (procedure)              



                                       [code = 597578893]              

 

             Future Scheduled 1956   DXA SCAN [code = DXA              [facility]



             Test         00:00:00     SCAN]                     [facility]

 

             Future Scheduled 1956   Screening for              [facility]

es



             Test         00:00:00     malignant neoplasm of              [facility]

l Center



                                       colon (procedure)              



                                       [code = 660557785]              

 

             Future Scheduled 1956   Screening for              [facility]

es



             Test         00:00:00     malignant neoplasm of              [facility]

[facility] Center



                                       colon (procedure)              



                                       [code = 588667090]              

 

             Future Scheduled 1956   Sigmoidoscopy [code =              [facility]



             Test         00:00:00     Sigmoidoscopy]              [facility]

 

             Future Scheduled 1956   Screening for              [facility]

es



             Test         00:00:00     malignant neoplasm of              [facility]

l Center



                                       breast (procedure)              



                                       [code = 757347704]              

 

             Future Scheduled 1956   CT Colonography              [facility]



             Test         00:00:00     (combo) [code = CT              [facility]

enter



                                       Colonography (combo)]              

 

             Future Scheduled 1956   Screening for              [facility]

es



             Test         00:00:00     malignant neoplasm of              [facility]

[facility] Center



                                       colon (procedure)              



                                       [code = 571468119]              

 

             Future Scheduled 1956   Screening for              [facility]

es



             Test         00:00:00     malignant neoplasm of              [facility]

[facility] Center



                                       colon (procedure)              



                                       [code = 794260574]              

 

             Future Scheduled 1956   DXA SCAN [code = DXA              [facility]



             Test         00:00:00     SCAN]                     [facility]

 

             Future Scheduled 1956   Screening for              [facility]

es



             Test         00:00:00     malignant neoplasm of              [facility]

l Center



                                       colon (procedure)              



                                       [code = 996103455]              

 

             Future Scheduled 1956   Screening for              [facility]

es



             Test         00:00:00     malignant neoplasm of              [facility]

l Center



                                       colon (procedure)              



                                       [code = 660236789]              

 

             Future Scheduled 1956   Sigmoidoscopy [code =              [facility]



             Test         00:00:00     Sigmoidoscopy]              [facility]

 

             Future Scheduled 1956   Screening for              [facility]

es



             Test         00:00:00     malignant neoplasm of              [facility]

l Center



                                       breast (procedure)              



                                       [code = 645275714]              

 

             Future Scheduled 1956   CT Colonography              [facility]



             Test         00:00:00     (combo) [code = CT              [facility]

enter



                                       Colonography (combo)]              

 

             Future Scheduled 1956   Screening for              [facility]

es



             Test         00:00:00     malignant neoplasm of              [facility]

l Center



                                       colon (procedure)              



                                       [code = 887789891]              

 

             Future Scheduled 1956   Screening for              [facility]

es



             Test         00:00:00     malignant neoplasm of              [facility]

[facility] Center



                                       colon (procedure)              



                                       [code = 636084911]              

 

             Future Scheduled 1956   DXA SCAN [code = DXA              [facility]



             Test         00:00:00     SCAN]                     [facility]

 

             Future Scheduled 1956   Screening for              [facility]

es



             Test         00:00:00     malignant neoplasm of              Med[facility]

l Center



                                       colon (procedure)              



                                       [code = 732905029]              

 

             Future Scheduled 1956   Screening for              [facility]

es



             Test         00:00:00     malignant neoplasm of              [facility]

[facility] Center



                                       colon (procedure)              



                                       [code = 169892811]              

 

             Future Scheduled 1956   Sigmoidoscopy [code =              [facility]



             Test         00:00:00     Sigmoidoscopy]              [facility]

 

             Future Scheduled 1956   Screening for              [facility]

es



             Test         00:00:00     malignant neoplasm of              Med[facility]

l Center



                                       breast (procedure)              



                                       [code = 795776511]              

 

             Future Scheduled 1956   CT Colonography              [facility]



             Test         00:00:00     (combo) [code = CT              [facility]

enter



                                       Colonography (combo)]              

 

             Future Scheduled 1956   Screening for              [facility]

es



             Test         00:00:00     malignant neoplasm of              [facility]

l Center



                                       colon (procedure)              



                                       [code = 136442834]              

 

             Future Scheduled 1956   Screening for              [facility]

es



             Test         00:00:00     malignant neoplasm of              [facility]

l Center



                                       colon (procedure)              



                                       [code = 976465443]              

 

             Future Scheduled 1956   DXA SCAN [code = DXA              [facility]



             Test         00:00:00     SCAN]                     [facility]

 

             Future Scheduled 1956   Screening for              [facility]

es



             Test         00:00:00     malignant neoplasm of              [facility]

l Center



                                       colon (procedure)              



                                       [code = 194264508]              

 

             Future Scheduled 1956   Screening for              [facility]

es



             Test         00:00:00     malignant neoplasm of              [facility]

l Center



                                       colon (procedure)              



                                       [code = 310816270]              

 

             Future Scheduled 1956   Sigmoidoscopy [code =              [facility]



             Test         00:00:00     Sigmoidoscopy]              [facility]

r







Encounters







        Start   End     Encounter Admission Attending Care    Care    Encounter 

Source



        Date/Time Date/Time Type    Type    Clinicians Facility Department ID   

   

 

        2022         Inpatient RUFINA Cuellar [facility]   ENDO    [id number]

8 [facility]



        09:00:00                         [address]

 

        2022 [facility]   [id number]

 [id number] [facility]



        14:20:00 12:50:00 Encounter         Sanket Marshall Roy                         [facility]

 

        2022 Inpatient [facility]    Cardiology 20

[id number] [facility]



        14:20:00 12:50:00                 VINAYAK                             

 

        2022 [facility]   [id number]

 [id number] [facility]



        14:20:00 12:50:00 Encounter         Sanket Marshall Roy                         [facility]

 

        2022 Orders                  [facility]   [id number]

[address] [facility]



        00:00:00 00:00:00 [facility]

 

        2022 Orders                  [facility]   [id number]

[address] [facility]



        00:00:00 00:00:00 [facility]

 

        2022 Outpatient                 [facility]     BCM     [id number]

[id number] [facility]



        00:00:00 23:59:00                                                 Steve



                                                                        e

 

        2022 Orders                  [facility]   [id number]

[address] [facility]



        00:00:00 00:00:00 [facility]

 

        2022 Orders                  [facility]   [id number]

[address] [facility]



        00:00:00 00:00:00 [facility]

 

        2022 Travel                  1.2.840.1 1.2.987.879 7595

[id number] [facility]i



        00:00:00 00:00:00                         98500.1.1 350.1.13.43 734     

st



                                                3.430.2.7 0.2.7.3.698         Ho

spita



                                                .3.177493 084.8           l



                                                .8                      

 

        2022-11-15 2022-11-15 Telephone         Tess, 1.2.840.1 [id number]

[id number] Methodi



        00:00:00 00:00:00                 Gregoria   16247.1.1         [address]



                                                3.430.2.7                 Hospit

a



                                                .3.097964                 l



                                                .8                      

 

        2022 Hospital         Suresh Pineda 1.2.840.1 1

[id number] 

[id number]                              Methodi



        20:01:00 18:10:00 Encounter         Zhen Jenkins 91038.1.1    

     [address]



                                        [facility] 3.430.2.7                 

Hospita



                                        [facility] .[phone]        

         l



                                                .8                      

 

        2022 Hospital         [facility], 1.2.840.1 [id number]

[id number] [place]



        00:00:00 00:00:00 Encounter         KUMAR 59426.1.1         208     Me

thodi



                                                3.430.2.7                 st



                                                .3.888824                 



                                                .8                      

 

        2022 Travel                  1.2.840.1 1.2.887.548 7901

[id number] Methodi



        00:00:00 00:00:00                         19782.1.1 350.1.13.43 679     

st



                                                3.430.2.7 0.2.7.3.698         Ho

spita



                                                .3.854289 084.8           l



                                                .8                      

 

        2022 Travel                  1.2.840.1 1.2.990.241 9061

147794 Methodi



        00:00:00 00:00:00                         12042.1.1 350.1.13.43 679     

st



                                                3.430.2.7 0.2.7.3.698         Ho

spita



                                                .3.380432 084.8           l



                                                .8                      

 

        2022 Orders          [place],  1.2.840.1 [id number]

6850 Methodi



        00:00:00 00:00:00 Only            Adriel 82812.1.1         [address]     [address]



                                        Baljinder 3.430.2.7                 Hosp

mimi



                                                .3.140287                 l



                                                .8                      

 

        2022 Orders          [place],  1.2.840.1 [id number]

6850 Methodi



        00:00:00 00:00:00 Only            Adriel 66079.1.1         [address]     st



                                        Baljinder 3.430.2.7                 Hosp

mimi



                                                .3.470213                 l



                                                .8                      

 

        2022-10-21 2022-10-21 Office          [place],  1.2.840.1 [id number]

5385 Methodi



        12:00:00 12:15:00 Visit           Adriel 33232.1.1         [address]     [address]



                                        Baljinder 3.430.2.7                 Hosp

mimi



                                                .3.762469                 l



                                                .8                      

 

        2022-10-21 2022-10-21 Office          [place],  1.2.840.1 [id number]

5385 Methodi



        12:00:00 12:15:00 Visit           Adriel 04200.1.1         [address]     [address]



                                        Baljinder 3.430.2.7                 Hosp

mimi



                                                .3.846124                 l



                                                .8                      

 

        2022-10-21 2022-10-21 Travel                  1.2.840.1 1.2.383.577 4020

822292 Methodi



        00:00:00 00:00:00                         72516.1.1 350.1.13.43 055     

st



                                                3.430.2.7 0.2.7.3.698         Ho

spita



                                                .3.421501 084.8           l



                                                .8                      

 

        2022-10-21 2022-10-21 Travel                  1.2.840.1 1.2.235.127 0106

877645 Methodi



        00:00:00 00:00:00                         45441.1.1 350.1.13.43 055     

st



                                                3.430.2.7 0.2.7.3.698         Ho

spita



                                                .3.849905 084.8           l



                                                .8                      

 

        2022-10-12 2022-10-12 Travel                  1.2.840.1 1.2.848.803 8801

098977 Methodi



        00:00:00 00:00:00                         98774.1.1 350.1.13.43 459     

st



                                                3.430.2.7 0.2.7.3.698         Ho

spita



                                                .3.657461 084.8           l



                                                .8                      

 

        2022-10-12 2022-10-12 Travel                  1.2.840.1 1.2.175.847 1296

[id number] Methodi



        00:00:00 00:00:00                         49755.1.1 350.1.13.43 459     

st



                                                3.430.2.7 0.2.7.3.698         Ho

spita



                                                .3.658465 084.8           l



                                                .8                      

 

        2022-10-05 2022-10-05 [facility],  1.2.840.1 [id number] 

[id number] Methodi



        12:10:58 23:59:00 Encounter         Adriel 90209.1.1         626     s

t



                                        Baljinder 3.430.2.7                 Hosp

mimi



                                                .3.635225                 l



                                                .8                      

 

        2022-10-05 2022-10-05 [facility],  1.2.840.1 [id number] 

76495 Methodi



        09:06:47 12:09:00 Encounter         Adriel 42260.1.1         624     s

t



                                        Baljinder 3.430.2.7                 Hosp

mimi



                                                .3.147973                 l



                                                .8                      

 

        2022-10-05 2022-10-05 [facility],  1.2.840.1 [id number] 

96433 Methodi



        08:25:04 09:05:00 Encounter         Adriel 81911.1.1         622     s

t



                                        Baljinder 3.430.2.7                 Hosp

mimi



                                                .3.186757                 l



                                                .8                      

 

        2022-10-05 2022-10-05 [facility],  1.2.840.1 [id number] 

[id number] [place]



        00:00:00 00:00:00 Encounter         ADRIEL 19764.1.1         622     M

ethodi



                                                3.430.2.7                 st



                                                .3.624190                 



                                                .8                      

 

        2022-10-05 2022-10-05 [facility],  1.2.840.1 [id number] 

[id number] [place]



        00:00:00 00:00:00 Encounter         ADRIEL 07803.1.1         624     M

ethodi



                                                3.430.2.7                 st



                                                .3.662285                 



                                                .8                      

 

        2022-10-05 2022-10-05 [facility],  1.2.840.1 [id number] 

[id number] [place]



        00:00:00 00:00:00 Encounter         ADRIEL 57734.1.1         626     M

ethodi



                                                3.430.2.7                 st



                                                .3.068585                 



                                                .8                      

 

        2022-10-05 2022-10-05 Travel                  1.2.840.1 1.2.752.016 1051

[id number] Methodi



        00:00:00 00:00:00                         22096.1.1 350.1.13.43 702     

st



                                                3.430.2.7 0.2.7.3.698         Ho

spita



                                                .3.535468 084.8           l



                                                .8                      

 

        2022-10-05 2022-10-05 Travel                  1.2.840.1 1.2.523.751 4343

131609 Methodi



        00:00:00 00:00:00                         24515.1.1 350.1.13.43 702     

st



                                                3.430.2.7 0.2.7.3.698         Ho

spita



                                                .3.355794 084.8           l



                                                .8                      

 

        2022 Office          [place], 1.2.840.1 [id number]

9152 Methodi



        14:15:00 14:15:00 Visit           [place]  48568.1.1         [address]     st



                                                3.430.2.7                 Hospit

a



                                                .3.278003                 l



                                                .8                      

 

        2022 Office          [place], 1.2.840.1 [id number]

52 Methodi



        14:15:00 14:15:00 Visit           [place]  51168.1.1         [address]     st



                                                3.430.2.7                 Hospit

a



                                                .3.383839                 l



                                                .8                      

 

        2022 Travel                  1.2.840.1 1.2.570.446 3461

[id number] Methodi



        00:00:00 00:00:00                         13459.1.1 350.1.13.43 876     

st



                                                3.430.2.7 0.2.7.3.698         Ho

spita



                                                .3.741937 084.8           l



                                                .8                      

 

        2022 Travel                  1.2.840.1 1.2.208.355 4991

241117 Methodi



        00:00:00 00:00:00                         75449.1.1 350.1.13.43 876     

st



                                                3.430.2.7 0.2.7.3.698         Ho

spita



                                                .3.240128 084.8           l



                                                .8                      

 

        2022 Office          Bridger,  1.2.840.1 [id number] 2100

313 Methodi



        12:45:00 13:22:26 Visit           Adriel 70652.1.1         547     st



                                        Baljinder 3.430.2.7                 Hosp

mimi



                                                .3.664769                 l



                                                .8                      

 

        2022 Office          Bridger,  1.2.840.1 [id number] 2100 Methodi



        12:45:00 13:22:26 Visit           Adriel 54763.1.1         547     st



                                        Baljinder 3.430.2.7                 Hosp

mimi



                                                .3.529197                 l



                                                .8                      

 

        2022 Travel                  1.2.840.1 1.2.666.441 4123

706376 Methodi



        00:00:00 00:00:00                         94820.1.1 350.1.13.43 541     

st



                                                3.430.2.7 0.2.7.3.698         Ho

spita



                                                .3.708883 084.8           l



                                                .8                      

 

        2022 Travel                  1.2.840.1 1.2.102.433 4149

725650 Methodi



        00:00:00 00:00:00                         40294.1.1 350.1.13.43 541     

st



                                                3.430.2.7 0.2.7.3.698         Ho

spita



                                                .3.186116 084.8           l



                                                .8                      

 

          2022 [facility]

hbhai 1.2.840.1 

[id number]                 [id number]                Methodi



        07:21:00 20:54:00 Encounter         Martina Douglass 40071.1.1         90

4     st



                                                3.430.2.7                 Hospit

a



                                                .3.263687                 l



                                                .8                      

 

          2022 [facility]

hbhai 1.2.840.1 

[id number]                 [id number]                Methodi



        07:21:00 20:54:00 Encounter         Martina Douglass 02565.1.1         90

4     st



                                                3.430.2.7                 Hospit

a



                                                .3.155437                 l



                                                .8                      

 

        2022 Prep for         Odom, 1.2.840.1 [id number] 63088

07145 Methodi



        00:00:00 00:00:00 Surgery         Louann D 32086.1.1         190     s

t



                                                3.430.2.7                 Hospit

a



                                                .3.803994                 l



                                                .8                      

 

        2022 Prep for         Odom, 1.2.840.1 [id number] 

84235 Methodi



        00:00:00 00:00:00 Surgery         Louann D 68114.1.1         190     s

t



                                                3.430.2.7                 Hospit

a



                                                .3.977095                 l



                                                .8                      

 

        2022 Travel                  1.2.840.1 1.2.455.942 6321

979554 Methodi



        00:00:00 00:00:00                         68867.1.1 350.1.13.43 602     

st



                                                3.430.2.7 0.2.7.3.698         Ho

spita



                                                .3.648893 084.8           l



                                                .8                      

 

        2022 Travel                  1.2.840.1 1.2.667.095 9762

579182 Methodi



        00:00:00 00:00:00                         50525.1.1 350.1.13.43 602     

st



                                                3.430.2.7 0.2.7.3.698         [facility]



                                                .3.792058 084.8           l



                                                .8                      

 

        2022 Outpatient CLEMENTE Minaya   OUTD    [id number]

562 [facility]



        04:58:00 04:58:00                 Venancio                  89      [facility]

 

        2022 Outpatient CLEMENTE Minaya   OUTD    [id number]

090 [facility]



        06:37:00 06:37:00                 Venancio                  64      [facility]

 

        2022 Lab                     [facility]   [id number]

[address] [facility]



        00:00:00 00:00:00 [facility]

 

        2022 Lab                     [facility]   [id number]

[address] [facility]



        00:00:00 00:00:00 [facility]

 

        2022 [facility]         [facility]     476

806-202 [place]



        00:00:00 00:00:00                                         [address]138   [place]



                                                                        Urology

 

        2022 Emergency         Louann Jaime 1.2.840

.1 [id number] 

[id number]                              Methodi



        02:04:00 18:01:00                 Aki Dupree 46306.1.1         026     

[facility] 3.430.2.7          

       [facility] .[facility].494214                 

l



                                                .8                      

 

        2022 Emergency         Louann Jaime 1.2.840

.1 [id number] 

[id number]                              Methodi



        02:04:00 18:01:00                 Aki Dupree 84362.1.1         026     

[facility] 3.430.2.7          

       [facility] .3.506955                 

l



                                                .8                      

 

        2022 Travel                  1.2.840.1 1.2.946.862 6944

181421 Methodi



        00:00:00 00:00:00                         57254.1.1 350.1.13.43 552     

st



                                                3.430.2.7 0.2.7.3.698         Ho

spita



                                                .3.448752 084.8           l



                                                .8                      

 

        2022 Travel                  1.2.840.1 1.2.396.199 8596

165676 Methodi



        00:00:00 00:00:00                         34459.1.1 350.1.13.43 552     

st



                                                3.430.2.7 0.2.7.3.698         Ho

spita



                                                .3.155432 084.8           l



                                                .8                      

 

        2022 Outpatient                 nullFlavo [facility] 3

[id number] Memoria



        21:20:00 04:59:59                         r       Medicine 56      l



                                                        Rudy Hines

n

 

        2022 Outpatient                 [facility]Flavo [facility] 3

[id number] Memoria



        21:20:00 04:59:59                         r       Medicine 56      l



                                                        Rudy Hines

n

 

        2022 Outpatient         Coyne,  MG    [facility]    [id number]

365 



        16:20:00 23:59:59                 Charisse N                 56      

 

        2022 Outpatient                 [facility]    [facility]    [id number]

365 Memoria



        16:20:00 16:20:00                                         56      l



                                                                        Barron

 

        2022 Emergency         Nweze,  1.2.840.1 [id number] 

954489 Methodi



        17:10:00 22:30:00                 Martha   37892.1.1         503     st



                                        [place] 3.430.2.7                 Hosp

mimi



                                                .3.572295                 l



                                                .8                      

 

        2022 Emergency         Nweze,  1.2.840.1 [id number] 

307717 Methodi



        17:10:00 22:30:00                 Martha   31410.1.1         503     st



                                        [place] 3.430.2.7                 Hosp

mimi



                                                .3.088407                 l



                                                .8                      

 

        2022 Travel                  1.2.840.1 1.2.651.790 2400

544794 Methodi



        00:00:00 00:00:00                         50327.1.1 350.1.13.43 329     

st



                                                3.430.2.7 0.2.7.3.698         Ho

spita



                                                .3.338357 084.8           l



                                                .8                      

 

        2022 Travel                  1.2.840.1 1.2.274.369 8853

796012 Methodi



        00:00:00 00:00:00                         69941.1.1 350.1.13.43 329     

st



                                                3.430.2.7 0.2.7.3.698         Ho

spita



                                                .3.829869 084.8           l



                                                .8                      

 

        2022 [facility] 1.2.840.1 104

141198 

[id number]                              Methodi



        21:20:00 13:07:00 Encounter         Rosalio Angulo 70165.1.1         968  

   st



                                        Christine Saini 3.430.2.7         

        Hospita



                                        [facility] .3.579739          

       l



                                                .8                      

 

        2022 [facility] Parveen T. 1.2.840.1 104

[id number] 

[id number]                              Methodi



        21:20:00 13:07:00 Encounter         Rosalio Angulo 10050.1.1         968  

   st



                                        Christine Saini 3.430.2.7         

        Hospita



                                        [facility] .3.349994          

       l



                                                .8                      

 

        2022 Anesthesia         Hasmukh   1.2.840.1 [id number] 

7127145 Methodi



        13:41:00 14:30:00 Event           Tadeoao  86497.1.1         538     st



                                        Laura  3.430.2.7                 Hospit

a



                                                .3.776533                 l



                                                .8                      

 

        2022 Anesthesia         Hasmukh   1.2.840.1 [id number] 

[id number] Methodi



        13:41:00 14:30:00 Event           Yaoyao  89867.1.1         538     st



                                        Laura  3.430.2.7                 Hospit

a



                                                .3.342195                 l



                                                .8                      

 

        2022 Travel                  1.2.840.1 1.2.012.791 0483

127026 Methodi



        00:00:00 00:00:00                         80568.1.1 350.1.13.43 714     

st



                                                3.430.2.7 0.2.7.3.698         Ho

spita



                                                .3.886371 084.8           l



                                                .8                      

 

        2022 Travel                  1.2.840.1 1.2.752.946 5377

018687 Methodi



        00:00:00 00:00:00                         61595.1.1 350.1.13.43 714     

st



                                                3.430.2.7 0.2.7.3.698         Ho

spita



                                                .3.820925 084.8           l



                                                .8                      

 

        2022 Outpatient Suzie Ramos [facility]   DA[facility]    LA0

[id number] [facility]



        07:07:00 07:07:00                                         79      [facility]

 

        [facility]   F[facility]

7 [facility]



        07:07:00 07:07:00                                            [facility]

 

        2022 Phone                   nullFlavo [facility] 3467

914701 Memoria



        15:25:04 04:59:59 Message                 r       Medicine 17      l



                                                        Rudy Hines

michelle

 

        2022 Phone                   nullFlavo [facility] 3467

[id number] Memoria



        15:25:04 04:59:59 Message                 r       Medicine 17      l



                                                        Rudy Hines

michelle

 

        2022 Outpatient                 [facility]    [id number]

355 



        10:25:04 23:59:59                                         17      

 

        2022 Emergency EM      TenHadley hansen [facility]   CT    LA00

[id number] [facility]



        12:08:00 15:51:00                                         00      [facility]

 

        2022 Emergency EM      Hadley Medina [facility]   [facility]   F945

77202 [facility]



        12:08:00 15:51:00                                            [facility]

 

        2022 Outpatient RUFINA Cuellar, [facility]   ENDO    

86763 [facility]



        07:10:00 07:10:00                 [facility]                   92      [facility]

 

        2022 Ambulatory                 [facility]Flavo [facility] 3

[id number] Memoria



        15:15:00 15:15:00 Pre-Reg                 r       Medicine 54      l



                                                        Rudy Hines

n

 

        2022 Ambulatory                 [facility]Flavo [facility] 3

[id number] Memoria



        15:15:00 15:15:00 Pre-Reg                 r       Medicine 54      l



                                                        Rudy Hines

n

 

        2022 Outpatient                 [facility]    [id number]

365 [facility]



        10:15:00 10:15:00                                         54      l



                                                                        Barron

 

        2022 Outpatient         Coyne,  [facility]    [facility]    [id number]

365 



        10:15:00 10:15:00                 Charisse N                 54      

 

        2022 Outpatient                 [facility]    [id number]

365 [facility]



        10:30:00 10:30:00                                         55      l



                                                                        Barron

 

        2022 Outpatient                 [facility]    [id number]

365 [facility]



        10:30:00 10:30:00                                         55      [facility]

 

        2022 Patient         Joana Adan 1.2.840.1 [id number] 21

[id number] 

Methodi



        00:00:00 00:00:00 Outreach                 95535.1.1         020     st



                                                3.430.2.7                 Hospit

a



                                                .3.926468                 l



                                                .8                      

 

        2022 Patient         Joana Adan 1.2.840.1 [id number] 21

[id number] 

Methodi



        00:00:00 00:00:00 Outreach                 50404.1.1         020     st



                                                3.430.2.7                 Hospit

a



                                                .3.210505                 l



                                                .8                      

 

        2022 Office          Vadim 1.2.840.1 [id number]

0540 Methodi



        10:15:00 11:21:59 Visit           Daylin  44290.1.1         779     st



                                                3.430.2.7                 Hospit

a



                                                .3.774396                 l



                                                .8                      

 

        2022 Office          Ekboom, 1.2.840.1 [id number] 635737

0540 Methodi



        10:15:00 11:21:59 Visit           Daylin  50201.1.1         779     st



                                                3.430.2.7                 Hospit

a



                                                .3.195487                 l



                                                .8                      

 

        2022 Travel                  1.2.840.1 1.2.241.983 2020

257722 Methodi



        00:00:00 00:00:00                         24390.1.1 350.1.13.43 779     

st



                                                3.430.2.7 0.2.7.3.698         Ho

spita



                                                .3.435898 084.8           l



                                                .8                      

 

        2022 Travel                  1.2.840.1 1.2.254.332 3720

875007 Methodi



        00:00:00 00:00:00                         56374.1.1 350.1.13.43 779     

st



                                                3.430.2.7 0.2.7.3.698         Ho

spita



                                                .3.584460 084.8           l



                                                .8                      

 

        2022 Hospital         Suresh Pineda 1.2.840.1 1

[id number] 

[id number]                              Methodi



        12:16:00 16:05:00 Encounter         Melissa Pickett 42952.1.1   

      492     st



                                                3.430.2.7                 Hospit

a



                                                .3.575226                 l



                                                .8                      

 

        2022 Hospital         Suresh Pineda 1.2.840.1 1

[id number] 

[id number]                              Methodi



        12:16:00 16:05:00 Encounter         Melissa Pickett 81603.1.1   

      492     st



                                                3.430.2.7                 Hospit

a



                                                .3.882009                 l



                                                .8                      

 

        2022 Anesthesia         Naseem Willis 1.2.8

40.1 [id number] 

[id number]                              Methodi



        13:07:00 14:43:00 Event           Elaina [place] 17616.1.1         65

0     st



                                                3.430.2.7                 Hospit

a



                                                .3.465865                 l



                                                .8                      

 

        2022 Anesthesia         Naseem Willis 1.2.8

40.1 [id number] 

[id number]                              Methodi



        13:07:00 14:43:00 Event           Elaina Clark 31010.1.1         65

0     st



                                                3.430.2.7                 Hospit

a



                                                .3.201199                 l



                                                .8                      

 

        2022 Telephone         Ekeruo, 1.2.840.1 [id number] 

104495 Methodi



        00:00:00 00:00:00                 Daylin  69085.1.1         006     st



                                                3.430.2.7                 Hospit

a



                                                .3.339563                 l



                                                .8                      

 

        2022 Telephone         Ekeruo, 1.2.840.1 [id number] 

045199 Methodi



        00:00:00 00:00:00                 Daylin  57347.1.1         006     st



                                                3.430.2.7                 Hospit

a



                                                .3.835538                 l



                                                .8                      

 

        2022 Phone                   nullFlavo [facility] 3467

277214 Memoria



        17:07:02 05:59:59 Message                 r       Medicine 16      l



                                                        Rudy Hines

n

 

        2022 Phone                   nullFlavo [facility] 3467

[id number] Memoria



        17:07:02 05:59:59 Message                 r       Medicine 16      l



                                                        Rudy Hines

michelle

 

        2022 Outpatient                 [facility]    [id number]

355 



        11:07:02 23:59:59                                         16      

 

        2022 Travel                  1.2.840.1 1.2.250.317 5438

120031 Methodi



        00:00:00 00:00:00                         80015.1.1 350.1.13.43 996     

st



                                                3.430.2.7 0.2.7.3.698         Ho

spita



                                                .3.807223 084.8           l



                                                .8                      

 

        2022 Travel                  1.2.840.1 1.2.696.225 8003

120031 Methodi



        00:00:00 00:00:00                         86994.1.1 350.1.13.43 996     

st



                                                3.430.2.7 0.2.7.3.698         Ho

spita



                                                .3.180129 084.8           l



                                                .8                      

 

        2022 Office          [facility],  1.2.840.1 [id number] 038061

7744 Methodi



        16:30:00 16:34:07 Visit           Brenna 90370.1.1         169     st



                                                3.430.2.7                 Hospit

a



                                                .3.439436                 l



                                                .8                      

 

        2022 Office          [facility],  1.2.840.1 [id number] 898609

7744 Methodi



        16:30:00 16:34:07 Visit           Brenna 50022.1.1         169     st



                                                3.430.2.7                 Hospit

a



                                                .3.721426                 l



                                                .8                      

 

        2022 [facility],  1.2.840.1 [id number] 62843

94444 Methodi



        09:45:00 23:59:00 Encounter         Brenna 41937.1.1         979     

st



                                                3.430.2.7                 Hospit

a



                                                .3.503409                 l



                                                .8                      

 

        2022 [facility],  1.2.840.1 [id number] 

83518 Methodi



        09:45:00 23:59:00 Encounter         Brenna 50530.1.1         979     

st



                                                3.430.2.7                 Hospit

a



                                                .3.286669                 l



                                                .8                      

 

        2022 Travel                  1.2.840.1 1.2.055.291 4829

281009 Methodi



        00:00:00 00:00:00                         84136.1.1 350.1.13.43 961     

st



                                                3.430.2.7 0.2.7.3.698         Ho

spita



                                                .3.817180 084.8           l



                                                .8                      

 

        2022 Travel                  1.2.840.1 1.2.597.540 7353

[id number] Methodi



        00:00:00 00:00:00                         14891.1.1 350.1.13.43 961     





                                                3.430.2.7 0.2.7.3.698         Ho

spita



                                                .3.867326 084.8           l



                                                .8                      

 

        2022 Phone                   [facility]

[id number] Memoria



        19:33:53 05:59:59 Message                 r       Medicine 15      dennis Hines

n

 

        2022 Phone                   [facility]

[id number] Memoria



        19:33:53 05:59:59 Message                 r       Medicine 15      dennis Hines

n

 

        2022 Outpatient                 [facility]    [id number]

355 



        13:33:53 23:59:59                                         15      

 

        2021 Phone                   [facility]

[id number] Memoria



        19:38:03 05:59:59 Message                 r       Medicine 14      l



                                                        Rudy Hines

n

 

        2021 Phone                   [facility] 34[facility]

[id number] Memoria



        19:38:03 05:59:59 Message                 r       Medicine 14      dennis Hines

n

 

        2021 Outpatient                 [facility]    [id number]

355 



        13:38:03 23:59:59                                         14      

 

        2021 Outpatient                 [facility] 3

[id number] Memoria



        21:15:00 05:59:59                         r       Medicine 53      l



                                                        Rudy Hines

n

 

        2021 Outpatient                 [facility] 3

[id number] Memoria



        21:15:00 05:59:59                         r       Medicine 53      l



                                                        Rudy Hines

n

 

        2021 Outpatient         [place]ne,  [facility]    [id number]

365 



        15:15:00 23:59:59                 Charisse MARTINEZ                 53      

 

        2021 Outpatient                 [facility]    [id number]

365 Memoria



        15:15:00 15:15:00                                         Rashel Mart

 

        2021 Office          stephen,  1.2.840.1 [id number]

2742 Methodi



        15:30:00 16:01:04 Visit           Brenna 92434.1.1         834     st



                                                3.430.2.7                 Hospit

a



                                                .3.778616                 l



                                                .8                      

 

        2021 Travel                  1.2.840.1 1.2.011.417 9790

[id number] Methodi



        00:00:00 00:00:00                         22341.1.1 350.1.13.43 575     

st



                                                3.430.2.7 0.2.7.3.698         Ho

spita



                                                .3.102483 084.8           l



                                                .8                      

 

        2021 Between                 [facility]

[id number] Memoria



        14:18:24 14:18:24 Visit                   r       Medicine 62      l



                                                        Rudy martinez

 

        2021 Between                 [facility] 3467

[id number] Memoria



        14:18:24 14:18:24 Visit                   r       Medicine 62      l



                                                        Rudy martinez

 

        2021 Outpatient                 [facility]    [id number]

375 



        08:18:24 08:18:24                                         62      

 

        2021 Between                 [facility]67

[id number] Memoria



        00:56:47 00:56:47 Visit                   r       Medicine 61      l



                                                        Rudy martinez

 

        2021 Between                 [facility] 3467

[id number] Memoria



        00:56:47 00:56:47 Visit                   r       Medicine 61      l



                                                        Rudy martinez

 

        2021 Outpatient                 [facility]    [id number]

375 



        18:56:47 18:56:47                                         61      

 

        2021 Outpatient                 [facility] 3

[id number] Memoria



        20:00:00 05:59:59                         r       Medicine 52      l



                                                        Rudy martinez

 

        2021 Outpatient                 [facility] 3

[id number] Memoria



        20:00:00 05:59:59                         r       Medicine 52      l



                                                        Rudy martinez

 

        2021 Outpatient         Coyne,  [facility]    [id number]

365 



        14:00:00 23:59:59                 Charisse MARTINEZ                 52      

 

        2021 Outpatient                 [facility]    [id number]

365 Memoria



        14:00:00 14:00:00                                         52      dennis Mart

 

        2021 Phone                   [facility] 3467

[id number] Memoria



        21:38:38 05:59:59 Message                 r       Medicine 13      dennis martinez

 

        2021 Phone                   [facility] Felipa

[id number] Memoria



        21:38:38 05:59:59 Message                 r       Medicine 13      dennis martinez

 

        2021 Outpatient                 [facility]    [id number]

355 



        15:38:38 23:59:59                                         13      

 

        2021 Office          Vadim, 1.2.840.1 [id number]

2744 Methodi



        11:00:00 11:44:58 Visit           Daylin  47271.1.1         513     st



                                                3.430.2.7                 Hospit

a



                                                .3.587362                 l



                                                .8                      

 

        2021 Travel                  1.2.840.1 1.2.397.901 6693

[id number] Methodi



        00:00:00 00:00:00                         21847.1.1 350.1.13.43 808     

st



                                                3.430.2.7 0.2.7.3.698         Ho

spita



                                                .3.153832 084.8           l



                                                .8                      

 

        2021-11-15 2021 Between                 [facility] 3467

[id number] Memoria



        15:33:59 15:33:59 Visit                   r       Medicine 59      l



                                                        Rudy Hines

michelle

 

        2021-11-15 2021 Between                 [facility] 3467

[id number] Memoria



        15:33:59 15:33:59 Visit                   r       Medicine 59      l



                                                        Rudy Hines

michelle

 

        2021-11-15 2021 Outpatient                 [facility]    [id number]

375 



        09:33:59 09:33:59                                         59      

 

        2021 2021-10-05 Inpatient         JESSICA, [facility]     074     [id number]

[id number] [place]



        00:00:00 00:00:00                 MELISSA                    329     Method

i



                                                                        st

 

        2021 Outpatient         [facility] PRIV    PRIV    140

[id number] [facility]



        00:00:00 00:00:00                 n_J                     [id number] Medica

l

 

        2021 Outpatient         OPPERMANN, [facility]     2100

[id number] [place]



        00:00:00 00:00:00                 JULIANNA                 104     Method

i



                                                                        

 

        2021 Outpatient         AHMED,  [facility]     [id number]

[id number] [place]



        00:00:00 00:00:00                 RAZIUDDIN                 273     Meth

farhana



                                                                        

 

        2021 Outpatient         EKERUO, [facility]     [id number]

411 [place]



        00:00:00 00:00:00                 DAYLIN                  787     Method

i



                                                                        

 

        2021 Outpatient         DAOURA, [facility]     [id number]

587 [place]



        00:00:00 00:00:00                 MAGY                 546     Method

i



                                                                        

 

        2021 Inpatient         MATHIVANAN, [facility]     2100

[id number] [place]



        00:00:00 00:00:00                 MELVIN                   275     Method

i



                                                                        

 

        2021 Outpatient         AHMED,  [facility]     [id number]

[id number] [place]



        00:00:00 00:00:00                 RAZIUDDIN                 199     Meth

farhana



                                                                        

 

        2021 Outpatient         EKERUO, [facility]     021     [id number]

337 [place]



        00:00:00 00:00:00                 DAYLIN                  640     Method

i



                                                                        

 

        2021 Outpatient         EKERUO, [facility]     [id number]

412 [place]



        00:00:00 00:00:00                 DAYLIN                  435     Method

i



                                                                        

 

        2021 Outpatient         EKERUO, [facility]     [id number]

412 [place]



        00:00:00 00:00:00                 DAYLIN                  537     Method

i



                                                                        

 

        2021 Outpatient         EKERUO, [facility]     [id number]

029 [place]



        00:00:00 00:00:00                 DAYLIN                  709     Method

i



                                                                        

 

        2021 Inpatient         SOLIPURAM, [facility]     064     [id number]

[id number] [place]



        00:00:00 00:00:00                 MELISSA                    685     Method

i



                                                                        

 

        2021 Outpatient         AHMED,  [facility]     [id number]

046 [place]



        00:00:00 00:00:00                 RAZIUDDIN                 101     Meth

farhana



                                                                        st

 

        2021 Outpatient                 [facility]

[id number] Memoria



        01:00:00 04:59:00                         r       [place] 58      l



                                                        [place]         Meredith



 

        2021 Outpatient                 [facility]

[id number] Memoria



        01:00:00 04:59:00                         r       Barron 58      l



                                                        [place]         Meredith



 

        2021 Outpatient         AHMED,  [facility]    PUL     [id number]   

 [facility]



        20:00:00 23:59:00                 RAZIUDDIN                         

 

        2021 Outpatient         Ahmed,  [facility]    [facility]    [id number]

375 



        20:00:00 23:59:00                 Raziuddin                 58      

 

        2021 Outpatient         AHMED,  [facility]     [id number]

900 [place]



        00:00:00 00:00:00                 RAZIUDDIN                 598     Meth

farhana



                                                                        

 

        2021-04-15 2021 Inpatient         PRATIMAIVANAN, [facility]     064     2100

[id number] [place]



        00:00:00 00:00:00                 MELVIN                   060     Method

i



                                                                        

 

        2021 Outpatient                 [facility]     [id number]

422 [place]



        00:00:00 00:00:00                                         470     Method

i



                                                                        

 

        2021 Outpatient                 [facility] 3

[id number] Memoria



        19:30:00 04:59:59                         r       Medicine 51      l



                                                        Rudy Hines

n

 

        2021 Outpatient                 [facility] 3

[id number] Memoria



        19:30:00 04:59:59                         r       Medicine 51      l



                                                        Grant         Donny

n

 

        2021 Outpatient         [place]ne,  [facility]    MG    [id number]

365 



        14:30:00 23:59:59                 Charisse N                 51      

 

        2021 Outpatient                 [facility]    [facility]    [id number]

365 Memoria



        14:30:00 14:30:00                                         51      l



                                                                        [place]

 

        2021 Outpatient                 [facility]     [id number]

901 [place]



        00:00:00 00:00:00                                         398     Method

i



                                                                        st

 

        2021 Between                 [facility]

[id number] Memoria



        13:38:03 13:38:03 Visit                   r       Medicine 57      l



                                                        Rudy Hines

n

 

        2021 Between                 [facility]67

[id number] Memoria



        13:38:03 13:38:03 Visit                   r       Medicine 57      l



                                                        Rudy Hines

n

 

        2021 Outpatient                 [facility]    [facility]    [id number]

375 



        08:38:03 08:38:03                                         57      

 

        2021 Outpatient         DRUMED,  [facility]     [id number]

980 [place]



        00:00:00 00:00:00                 RAZIUDDIN                 554     Meth

farhana



                                                                        

 

        [facility]     2100

[id number] [place]



        00:00:00 00:00:00                 MELVIN                   541     Method

i



                                                                        

 

        2021 Inpatient         ANAIS [facility]     Ayse     2100

[id number] [place]



        00:00:00 00:00:00                 MELVIN                   559     Method

i



                                                                        

 

        2020 Inpatient         ANAIS [facility]     012     2100

[id number] [place]



        00:00:00 00:00:00                 MELVIN                   271     Method

i



                                                                        st

 

        2020 Outpatient                 [facility] 3

[id number] Memoria



        16:30:00 05:59:59                         r       Medicine 50      l



                                                        Rudy Hines

n

 

        2020 Outpatient                 [facility] 3

[id number] Memoria



        16:30:00 05:59:59                         r       Medicine 50      l



                                                        Rudy Hines

n

 

        2020 Outpatient         [place]ne,  [facility]    [facility]    [id number]

365 



        10:30:00 23:59:59                 Charisse MARTINEZ                 50      

 

        2020 Outpatient                 [facility]    [facility]    [id number]

365 Memoria



        10:30:00 10:30:00                                         50      l



                                                                        [place]

 

        2020 Inpatient         NIEVES, [facility]     012     [id number]

[id number] [place]



        00:00:00 00:00:00                 MELISSA                    464     Method

i



                                                                        

 

        2020-10-23 2020 Inpatient         NIEVES, [facility]     012     [id number]

[id number] [place]



        00:00:00 00:00:00                 MELISSA                    557     Method

i



                                                                        

 

        2020-10-23 2020-10-24 Outpt Diag                 nullFlavo [facility]    [id number]

[id number] Memoria



        18:10:00 04:59:00 Services                 r       Outpatient 06      l



                                                        Imaging         Barron



                                                        [place]         

 

        2020-10-23 2020-10-24 Outpt Diag                 nullFlavo [facility]    [id number]

[id number] Memoria



        18:10:00 04:59:00 Services                 r       Outpatient 06      l



                                                        Imaging         Barron



                                                        [place]         

 

        2020-10-23 2020-10-23 Outpatient         Tristaheathlyle, 29    [facility]    [id number]

[id number] 



        13:10:00 23:59:00                 Abid Rich      

 

        2020-10-21 2020-10-22 Between                 nullFlavo [facility] 3467

[id number] Memoria



        17:58:19 17:58:19 Visit                   r       Medicine 56      l



                                                        Rudy martinez

 

        2020-10-21 2020-10-22 Between                 [facility] 3467

[id number] Memoria



        17:58:19 17:58:19 Visit                   r       Medicine 56      l



                                                        Rudy martinez

 

        2020-10-21 2020-10-22 Outpatient                 MG    [facility]    [id number]

375 



        12:58:19 12:58:19                                         56      

 

        2020-10-13 2020-10-14 Outpatient                 [facility] 3

[id number] Memoria



        15:15:00 04:59:59                         r       Medicine 49      l



                                                        Rudy martinez

 

        2020-10-13 2020-10-14 Outpatient                 [facility] 3

[id number] Memoria



        15:15:00 04:59:59                         r       Medicine 49      l



                                                        Rudy martinez

 

        2020-10-13 2020-10-13 Outpatient         Coyne,  MG    [facility]    [id number]

365 



        10:15:00 23:59:59                 Charisse MARTINEZ                 49      

 

        2020-10-13 2020-10-13 Outpatient                 [facility]    [facility]    [id number]

365 Memoria



        10:15:00 10:15:00                                         49      l



                                                                        [place]

 

        2020 Outpt Diag                 nullFlavo [facility]    [id number]

[id number] Memoria



        17:02:00 04:59:00 Services                 r       Outpatient 05      l



                                                        Imaging         Barron



                                                        [place]         

 

        2020 Outpt Diag                 nullFlavo [facility]    08215

76414 Memoria



        17:02:00 04:59:00 Services                 r       Outpatient 05      l



                                                        Imaging         Barron



                                                        [place]         

 

        2020 Outpatient         Jazz, MH[facility]    [facility]    [id number]

[id number] 



        12:02:00 23:59:00                 Abdi N                 05      

 

        2020 Ambulatory                 [facility]    [id number]

[id number] Memoria



        19:00:00 19:00:00 Pre-Reg                 r       Nephrology 46      l



                                                        Rudy Hines

n

 

        2020 Ambulatory                 [facility]    [id number]

[id number] Memoria



        19:00:00 19:00:00 Pre-Reg                 r       Nephrology 46      l



                                                        Rudy Hines

n

 

        2020 Outpatient                 [facility]    [facility]    [id number]

365 Memoria



        14:00:00 14:00:00                                         46      l



                                                                        [place]

 

        2020 Outpatient         [facility]    346

[id number] 



        14:00:00 14:00:00                 Daca,                   46      



                                        Gabriela J                         

 

        2020 Outpatient                 [facility]    [facility]    [id number]

365 Mem[facility]



        11:15:00 11:15:00                                         47      l



                                                                        [place]

 

        2020 Outpatient                 [facility]    [facility]    [id number]

365 [facility]



        11:15:00 11:15:00                                         47      l



                                                                        [place]

 

        2020 Outpatient                 nullFlavo [facility]    [id number]

[id number] Memoria



        19:45:00 04:59:59                         r       Nephrology 48      l



                                                        Rudy Hines

n

 

        2020 Outpatient                 nullFlavo [facility]    [id number]

[id number] Memoria



        19:45:00 04:59:59                         r       Nephrology 48      l



                                                        Rudy Hines

n

 

        2020 [facility]    346

[id number] 



        14:45:00 23:59:59                 Daca,                   48      



                                        Gabriela J                         

 

        2020 Outpatient                 [facility]    [facility]    [id number]

365 Memoria



        14:45:00 14:45:00                                         48      dennis



                                                                        [place]

 

        2020 Outpatient         CHAD, [facility]     [id number]

467 [place]



        00:00:00 00:00:00                 EMILIA                    832     Method

i



                                                                        st

 

        2020 Phone                   nullFlavo [facility]    [id number]

55 Memoria



        16:17:50 04:59:59 Message                 r       Nephrology 12      dennis Hines

michelle

 

        2020 Phone                   [facility]Flavo [facility]    [id number]

55 Memoria



        16:17:50 04:59:59 Message                 r       Nephrology 12      dennis Hines

michelle

 

        2020 Outpatient                 [facility]    [facility]    [id number]

355 



        11:17:50 23:59:59                                         12      

 

        2020 Outpatient                 nullFlavo [facility]    [id number]

[id number] Memoria



        19:00:00 04:59:59                         r       Nephrology 41      dennis Hines

michelle

 

        2020 Outpatient                 nullFlavo [facility]    [id number]

[id number] Memoria



        19:00:00 04:59:59                         r       Nephrology 41      l



                                                        Rudy Hines

michelle

 

        2020 Outpatient         [place]- [facility]    MG    346

[id number] 



        14:00:00 23:59:59                 Daca,                   41      



                                        Gabriela CLARK                         

 

        2020 Outpatient                 [facility]    IE    [id number]

365 [facility]



        14:00:00 14:00:00                                         41      dennis



                                                                        Barron

 

        2020 Phone                   nullFlavo MG    [id number]

55 [facility]



        18:35:51 04:59:59 Message                 r       Nephrology 11      dennis Arguello         Barron

 

        2020 Phone                   nullFlavo MG    [id number]

55 [facility]



        18:35:51 04:59:59 Message                 r       Nephrology 11      dennis Arguello         Barron

 

        2020 Outpatient                 [facility]    MG    [id number]

355 



        13:35:51 23:59:59                                         11      

 

        2020 Outpatient                 [facility]Fl[facility]

[id number] [facility]



        15:15:00 04:59:59                         r       Medicine 45      l



                                                        Rudy Hines

michelle

 

        2020 Outpatient                 [facility]Flavo [facility]

[id number] [facility]oria



        15:15:00 04:59:59                         r       Medicine 45      dennis Hines

n

 

        2020 Outpatient         [place],  [facility]    MG    [id number]

365 



        10:15:00 23:59:59                 Charisse N                 45      

 

        2020 Ambulatory                 [facility]

[id number] [facility]oria



        15:15:00 15:15:00 Pre-Reg                 r       Medicine 44      l



                                                        Rudy Hines

n

 

        2020 Ambulatory                 [facility]Fl[facility]

[id number] Memoria



        15:15:00 15:15:00 Pre-Reg                 r       Medicine 44      dennis martinez

 

        2020 Outpatient                 [facility]    IE    [id number]

365 [facility]



        10:15:00 10:15:00                                         45      dennis



                                                                        Barron

 

        2020 Outpatient                 [facility]    IE    [id number]

365 [facility]



        10:15:00 10:15:00                                         44      dennis



                                                                        Barron

 

        2020 Outpatient         [place],  MG    [facility]    [id number]

365 



        10:15:00 10:15:00                 Charisse N                 44      

 

        2020 Phone                   nullFlavo MG    [id number]

55 [facility]



        13:23:32 04:59:59 Message                 r       Nephrology 10      dennis martinez

 

        2020 Phone                   nullFlavo MG    [id number]

55 [facility]



        13:23:32 04:59:59 Message                 r       Nephrology 10      dennis martinez

 

        2020 Outpatient                 MG    MG    [id number]

355 



        08:23:32 23:59:59                                         10      

 

        2020 Between                 nullFlavo [facility] Family 3467

[id number] Memoria



        14:14:54 14:14:54 Visit                   r       Medicine 52      l



                                                        Rudy Hines

michelle

 

        2020 Between                 nullFlavo [facility] Family 3467

[id number] Memoria



        14:14:54 14:14:54 Visit                   r       Medicine 52      dennis Hines

michelle

 

        2020 Outpatient                 MG    MG    [id number]

375 



        09:14:54 09:14:54                                         52      

 

        2020 Outpatient                 [facility]    IE    [id number]

365 [facility]



        11:30:00 11:30:00                                         43      dennis



                                                                        Barron

 

        2020 Outpatient                 [facility]    [facility]    [id number]

365 [facility]



        11:30:00 11:30:00                                         43      dennis Mart

 

        2020 2020-04-15 Phone                   nullFlavo [facility] Family 3467

[id number] Memoria



        20:00:15 04:59:59 Message                 r       Medicine 09      dennis Hines

michelle

 

        2020 2020-04-15 Phone                   nullFlavo [facility] 3467

[id number] Memoria



        20:00:15 04:59:59 Message                 r       Medicine 09      dennis Hines

michelle

 

        2020 Outpatient                 [facility]    MG    [id number]

355 



        15:00:15 23:59:59                                         09      

 

        2020-04-10 2020 Phone                   nullFlavo [facility] 3467

[id number] Memoria



        17:18:28 04:59:59 Message                 r       Medicine 08      dennis Hines

michelle

 

        2020-04-10 2020 Phone                   nullFlavo [facility] 3467

[id number] Memoria



        17:18:28 04:59:59 Message                 r       Medicine 08      dennis Hines

michelle

 

        2020-04-10 2020 Phone                   nullFlavo [facility]    [id number]

55 [facility]



        13:26:55 04:59:59 Message                 r       Nephrology 07      dennis Hines

michelle

 

        2020-04-10 2020 Phone                   nullFlavo MG    [id number]

55 [facility]



        13:26:55 04:59:59 Message                 r       Nephrology 07      dennis Hiens

michelle

 

        2020-04-10 2020 Outpatient                 [facility]    MG    [id number]

355 



        12:18:28 23:59:59                                         08      

 

        2020-04-10 2020 Outpatient                 [facility]    MG    [id number]

355 



        08:26:55 23:59:59                                         07      

 

        2020 Outpatient                 nullFlavo [facility] 3

[id number] Memoria



        20:15:00 05:59:59                         r       Medicine 42      l



                                                        Rudy Hines

michelle

 

        2020 Outpatient                 nullFlavo [facility] 3

[id number] Memoria



        20:15:00 05:59:59                         r       Medicine 42      dennis martinez

 

        2020 Outpatient         [place],  MG    [facility]    [id number]

365 



        14:15:00 23:59:59                 Charisse MARTINEZ                 42      

 

        2020 Outpatient                 [facility]    [facility]    [id number]

365 [facility]



        14:15:00 14:15:00                                         42      dennis Mart

 

        2020 Phone                   nullFlavo [facility] 3467

[id number] Memoria



        14:22:27 05:59:59 Message                 r       Medicine 06      dennis martinez

 

        2020 Phone                   nullFlavo [facility] 3467

[id number] Memoria



        14:22:27 05:59:59 Message                 r       Medicine 06      dennis martinez

 

        2020 Outpatient                 MG    [facility]    [id number]

355 



        08:22:27 23:59:59                                         06      

 

        2019 Phone                   nullFlavo [facility] 3467

[id number] Memoria



        16:06:04 05:59:59 Message                 r       Medicine 05      dennis martinez

 

        2019 Phone                   nullFlavo [facility] 3467

[id number] Memoria



        16:06:04 05:59:59 Message                 r       Medicine 05      dennis martinez

 

        2019 Outpatient                 MG    [facility]    [id number]

355 



        10:06:04 23:59:59                                         05      

 

        2019 Between                 nullFlavo [facility]    [id number]

75 [facility]



        20:05:03 20:05:03 Visit                   r       Nephrology 45      l



                                                        Saundra Rosarioann

 

        2019 Between                 nullFlavo [facility]    [id number]

75 [facility]



        20:05:03 20:05:03 Visit                   r       Nephrology 45      l



                                                        Saundra Mart

 

        2019 Outpatient                 MG    [facility]    [id number]

375 



        14:05:03 14:05:03                                         45      

 

        2019 Outpatient                 nullFlavo [facility]    [id number]

[id number] Memoria



        20:45:00 05:59:59                         r       Nephrology 38      l



                                                        Rudy Hines

michelle

 

        2019 Outpatient                 nullFlavo [facility]    [id number]

[id number] Memoria



        20:45:00 05:59:59                         r       Nephrology 38      dennis Hines

michelle

 

        2019 Outpatient         [place]- MG    MG    [id number]

[id number] 



        14:45:00 23:59:59                 SamsonBrianda cade                         

 

        2019 Outpatient                 [facility]    [facility]    [id number]

365 [facility]



        14:45:00 14:45:00                                         38      dennis



                                                                        Barron

 

        2019 Between                 nullFlavo MG    [id number]

75 [facility]



        00:07:10 00:07:10 Visit                   r       Nephrology 43      dennis Mart

 

        2019 Between                 nullFlavo [facility]    [id number]

75 [facility]



        00:07:10 00:07:10 Visit                   r       Nephrology 43      dennis Mart

 

        2019 Outpatient                 MG    MG    [id number]

375 



        18:07:10 18:07:10                                         43      

 

        2019 Outpatient                 [facility]    IE    [id number]

365 [facility]



        09:00:00 09:00:00                                         39      dennis Mart

 

        2019 Outpatient                 [facility]    [facility]    [id number]

365 [facility]



        09:00:00 09:00:00                                         39      dennis



                                                                        Barron

 

        2019 Between                 nullFlavo [facility] Family 3467

[id number] Memoria



        21:47:12 21:47:12 Visit                   r       Medicine 41      dennis Hines

michelle

 

        2019 Between                 nullFlavo [facility] 3467

[id number] Memoria



        21:47:12 21:47:12 Visit                   r       Medicine 41      dennis Hines

michelle

 

        2019 Outpatient                 MG    MG    [id number]

375 



        15:47:12 15:47:12                                         41      

 

        2019-10-29 2019-10-30 Between                 nullFlavo [facility] Family 3467

[id number] Memoria



        22:13:13 22:13:13 Visit                   r       Medicine 40      dennis Hines

michelle

 

        2019-10-29 2019-10-30 Between                 nullFlavo [facility] Family 3467

[id number] Memoria



        22:13:13 22:13:13 Visit                   r       Medicine 40      dennis Rosariosylvie martinez

 

        2019-10-29 2019-10-30 Outpatient                 [facility]    MG    [id number]

375 



        17:13:13 17:13:13                                         40      

 

        2019-10-25 2019-10-26 Outpatient                 nullFlavo [facility]

[id number] Memoria



        14:45:00 04:59:59                         r       Medicine 40      dennis Hines

n

 

        2019-10-25 2019-10-26 Outpatient                 nullFlavo [facility] 3

[id number] Memoria



        14:45:00 04:59:59                         r       Medicine 40      dennis Hines

n

 

        2019-10-25 2019-10-25 Outpatient         [place],  [facility]    MG    [id number]

365 



        09:45:00 23:59:59                 Charisse N                 40      

 

        2019-10-25 2019-10-25 Outpatient                 [facility]    [facility]    [id number]

365 [facility]



        09:45:00 09:45:00                                         40      dennis



                                                                        Barron

 

        2019-10-17 2019-10-17 Ambulatory                 nullFlavo [facility] Family 3

[id number] Memoria



        16:00:00 16:00:00 Pre-Reg                 r       Medicine 36      dennis Hines

n

 

        2019-10-17 2019-10-17 Ambulatory                 nullFlavo [facility] 3

[id number] Memoria



        16:00:00 16:00:00 Pre-Reg                 r       Medicine 36      dennis Hines

n

 

        2019-10-17 2019-10-17 Outpatient                 [facility]    [facility]    [id number]

365 [facility]



        11:00:00 11:00:00                                         36      dennis



                                                                        Barron

 

        2019-10-17 2019-10-17 Outpatient         [place],  MG    MG    [id number]

365 



        11:00:00 11:00:00                 Charisse N                 36      

 

        2019-10-10 2019-10-11 Outpatient                 nullFlavo MG    [id number]

[id number] [facility]oria



        15:00:00 04:59:59                         r       Nephrology 35      dennis Hines

n

 

        2019-10-10 2019-10-11 Outpatient                 nullFlavo MG    [id number]

[id number] Memoria



        15:00:00 04:59:59                         r       Nephrology 35      dennis Hines

n

 

        2019-10-10 2019-10-10 Outpatient         [place],  [facility]    MG    [id number]

365 



        10:00:00 23:59:59                 Charisse N                 35      

 

        2019-10-10 2019-10-10 Outpatient                 [facility]    [facility]    [id number]

365 [facility]



        12:00:00 12:00:00                                         37      dennis Mart

 

        2019-10-10 2019-10-10 Outpatient                 IE    IE    [id number]

365 [facility]



        12:00:00 12:00:00                                         37      dennis



                                                                        Barron

 

        2019-10-10 2019-10-10 Outpatient                 IE    IE    [id number]

365 [facility]



        10:00:00 10:00:00                                         35      dennis Mart

 

        2019 Phone                   nullFlavo [facility] Family 3467

[id number] Memoria



        15:15:06 04:59:59 Message                 r       Medicine 04      dennis Hines

n

 

        2019 Phone                   nullFlavo [facility] 3467

[id number] Memoria



        15:15:06 04:59:59 Message                 r       Medicine 04      dennis martinez

 

        2019 Phone                   nullFlavo MG    [id number]

55 [facility]oria



        15:10:51 04:59:59 Message                 r       Nephrology 03      dennis martinez

 

        2019 Phone                   nullFlavo MG    [id number]

55 [facility]



        15:10:51 04:59:59 Message                 r       Nephrology 03      dennis martinez

 

        2019 Outpatient                 MG    MG    [id number]

355 



        10:15:06 23:59:59                                         04      

 

        2019 Outpatient                 MG    MG    [id number]

355 



        10:10:51 23:59:59                                         03      

 

        2019 Ambulatory                 nullFlavo MG    [id number]

[id number] [facility]



        20:00:00 20:00:00 Pre-Reg                 r       Nephrology 34      dennis Hines

michelle

 

        2019 Ambulatory                 nullFlavo MG    [id number]

[id number] [facility]oria



        20:00:00 20:00:00 Pre-Reg                 r       Nephrology 34      dennis Hines

michelle

 

        2019 Outpatient                 IE    IE    [id number]

365 [facility]



        15:00:00 15:00:00                                         34      dennis Mart

 

        2019 Outpatient         [place]- MG    MG    [id number]

[id number] 



        15:00:00 15:00:00                 Tate Gallegos                         

 

        2019 Between                 nullFlavo MG    [id number]

75 [facility]



        20:15:16 20:15:16 Visit                   r       Nephrology 34      dennis Mart

 

        2019 Between                 nullFlavo MG    [id number]

75 [facility]



        20:15:16 20:15:16 Visit                   r       Nephrology 34      dennis Mart

 

        2019 Outpatient                 [facility]    [facility]    [id number]

375 



        15:15:16 15:15:16                                         34      

 

        2019 Phone                   nullFlavo MG    [id number]

55 [facility]



        15:29:54 04:59:59 Message                 r       Nephrology 02      dennis Mart

 

        2019 Phone                   nullFlavo MG    [id number]

55 [facility]



        15:29:54 04:59:59 Message                 r       Nephrology 02      dennis Mart

 

        2019 Outpatient                 [facility]    [id number]

355 



        10:29:54 23:59:59                                         02      

 

        2019 Ambulatory                 nullFlavo MG    [id number]

[id number] [facility]oria



        16:30:00 16:30:00 Pre-Reg                 r       Nephrology 29      l



                                                        Rudy martinez

 

        2019 Ambulatory                 nullFlavo MG    [id number]

[id number] Memoria



        16:30:00 16:30:00 Pre-Reg                 r       Nephrology 29      l



                                                        Rudy martinez

 

        2019 Ambulatory                 nullFlavo MG    [id number]

[id number] Memoria



        16:15:00 16:15:00 Pre-Reg                 r       Nephrology 30      l



                                                        Rudy martinez

 

        2019 Ambulatory                 nullFlavo MG    [id number]

[id number] [facility]oria



        16:15:00 16:15:00 Pre-Reg                 r       Nephrology 30      l



                                                        Rudy martinez

 

        2019 Ambulatory                 nullFlavo MG Family 3

[id number] Memoria



        15:15:00 15:15:00 Pre-Reg                 r       Medicine 31      dennis Hines

n

 

        2019 Ambulatory                 [facility]Flavo [facility]

[id number] Memoria



        15:15:00 15:15:00 Pre-Reg                 r       Medicine 31      dennis Hines

n

 

        2019 Outpatient                 [facility]    [id number]

365 [facility]



        11:30:00 11:30:00                                         29      l



                                                                        [facility]

 

        2019 Outpatient         [place] [facility]    [facility]    [id number]

[id number] 



        11:30:00 11:30:00                 Marco A Gallegos                         

 

        2019 Outpatient                 [facility]    [facility]    [id number]

365 [facility]



        11:15:00 11:15:00                                         30      dennis



                                                                        [facility]

 

        2019 Outpatient         [place] [facility]    [facility]    346

[id number] 



        11:15:00 11:15:00                 Jamil Gallegos                         

 

        2019 Outpatient                 [facility]    [facility]    [id number]

365 [facility]



        10:15:00 10:15:00                                         31      dennis



                                                                        [facility]

 

        2019 Outpatient         [place],  MG    [facility]    [id number]

365 



        10:15:00 10:15:00                 Charisse MARTINEZ                       

 

        2019 Inpatient PABLITO ADEN,   [facility]     TELE    [id number]

[address] [place]



        10:05:00 17:55:00                 GOPAL                         [facility]

[facility]

 

        2019 Outpatient PABLITO ADEN,   [facility]     TELE    [id number]

427 [facility]



        21:36:00 19:00:00                 GOPAL                         [facility]

[facility]

 

        2019 Outpatient                 nullFlavo [facility] 346

[id number] [facility]oria



        20:40:00 04:59:59                         r       Care    33      l



                                                        [facility]

 

        2019 Outpatient                 nullFlavo [facility] 346

[id number] Memoria



        20:40:00 04:59:59                         r       Care    33      l



                                                        [place]         [facility]

 

        2019 Outpatient         Van     [facility]    [facility]    [id number]

365 



        15:40:00 23:59:59                 Yousif Espana                           

 

        2019 Outpatient                 [facility]    [facility]    [id number]

365 [facility]



        15:40:00 15:40:00                                         33      l



                                                                        [place]

 

        2019 Outpatient                 nullFlavo [facility]

[id number] Memoria



        15:15:00 04:59:59                         r       Medicine 32      l



                                                        Rudy martinez

 

        2019 Outpatient                 nullFlavo [facility]

[id number] [facility]oria



        15:15:00 04:59:59                         r       Medicine 32      dennis Hines

michelle

 

        2019 Outpatient         [place]- MG    [facility]    346

[id number] 



        10:15:00 23:59:59                 Kristie Gallegos                         

 

        2019 [facility]    [id number]

365 [facility]



        10:15:00 10:15:00                                         32      dennis



                                                                        Barron

 

        2019 Between                 nullFlavo [facility] Family 3467

[id number] Memoria



        19:00:16 19:00:16 Visit                   r       Medicine 29      dennis Hines

michelle

 

        2019 Between                 nullFlavo [facility] Family 3467

[id number] [facility]oria



        19:00:16 19:00:16 Visit                   r       Medicine 29      l



                                                        Rudy Hines

michelle

 

        2019 Outpatient                 [facility]    [id number]

375 



        14:00:16 14:00:16                                         29      

 

        2019 2019-03-15 Between                 nullFlavo MG    [id number]

75 [facility]



        15:02:37 15:02:37 Visit                   r       Nephrology 27      dennis Hines

michelle

 

        2019 2019-03-15 Between                 nullFlavo MG    [id number]

75 [facility]



        15:02:37 15:02:37 Visit                   r       Nephrology 27      dennis Hines

michelle

 

        2019 2019-03-15 Outpatient                 MG    [facility]    [id number]

375 



        10:02:37 10:02:37                                         27      

 

        2019 2019-03-15 Outpatient                 nullFlavo MG    [id number]

[id number] [facility]



        18:45:00 04:59:59                         r       Nephrology 28      dennis Hines

michelle

 

        2019 2019-03-15 Outpatient                 nullFlavo MG    [id number]

[id number] [facility]



        18:45:00 04:59:59                         r       Nephrology 28      dennis Hines

michelle

 

        2019 2019-03-15 Outpatient                 nullFlavo [facility] Family 3

[id number] [facility]



        14:15:00 04:59:59                         r       Medicine 22      l



                                                        Rudy Hines

michelle

 

        2019 2019-03-15 Outpatient                 nullFlavo [facility] Family 3

[id number] [facility]



        14:15:00 04:59:59                         r       Medicine 22      l



                                                        Grant         Donny

michelle

 

        2019 [facility]

[id number] 



        13:45:00 23:59:59                 SamsonHusam cade      



                                        Gabriela J                         

 

        2019 Out[facility]         [facility]    [id number]

365 



        09:15:00 23:59:59                 Charisse MARTINEZ                 22      

 

        2019 Outpatient                 IE    IE    [id number]

365 [facility]



        13:45:00 13:45:00                                         28      dennis



                                                                        [place]

 

        2019 Outpatient                 IE    IE    [id number]

[facility]



        09:15:00 09:15:00                                         22      dennis



                                                                        Barron

 

        2019 Between                 nullFlavo MG    [id number]

75 [facility]



        23:59:47 23:59:47 Visit                   r       Nephrology 26      dennis Hines

michelle

 

        2019 Between                 nullFlavo MG    [id number]

75 [facility]



        23:59:47 23:59:47 Visit                   r       Nephrology 26      dennis Hines

michelle

 

        2019 [id number]

[facility] 



        18:59:47 18:59:47                                         26      

 

        2019 Between                 nullFlavo MG    [id number]

75 [facility]



        22:00:33 22:00:33 Visit                   r       Nephrology 24      dennis Hines

michelle

 

        2019 Between                 nullFlavo MG    [id number]

75 [facility]



        22:00:33 22:00:33 Visit                   r       Nephrology 24      dennis Hines

michelle

 

        2019 [facility]    [id number]

[facility] 



        16:00:33 16:00:33                                         24      

 

        2019 Between                 nullFlavo MG    [id number]

75 [facility]



        04:48:44 04:48:44 Visit                   r       Nephrology 23      dennis Hines

michelle

 

        2019 Between                 nullFlavo MG    [id number]

75 [facility]



        04:48:44 04:48:44 Visit                   r       Nephrology 23      dennis Grant         Donny martinez

 

        2019 [facility]    [id number]

[facility] 



        22:48:44 22:48:44                                         23      

 

        2019 Ambulatory                 [facility]Flavo [facility]    [id number]

02712 [facility]



        20:30:00 20:30:00 Pre-Reg                 r       Nephrology 27      dennis martinez

 

        2019 Ambulatory                 nullFlavo [facility]    [id number]

[id number] [facility]



        20:30:00 20:30:00 Pre-Reg                 r       Nephrology 27      dennis martinez

 

        2019 Ambulatory                 nullFlavo [facility]    [id number]

68507 [facility]oria



        18:40:00 18:40:00 Pre-Reg                 r       Nephrology 24      dennis martinez

 

        2019 Ambulatory                 nullFlavo MG    [id number]

78788 [facility]oria



        18:40:00 18:40:00 Pre-Reg                 r       Nephrology 24      dennis martinez

 

        2019 Outpatient                 [facility]    [facility]    [id number]

[facility]



        14:30:00 14:30:00                                         Zuleyma Mart

 

        2019 Outpatient         [facility]    [facility]    346

[id number] 



        14:30:00 14:30:00                 RosyZulyema      



                                        Gabriela EDUARDO                         

 

        2019 Outpatient         [facility]    [facility]    346

[id number] 



        14:30:00 14:30:00                 Rosy                   Zuleyma      



                                        Gabriela EDUARDO                         

 

        2019 Outpatient                 [facility]    [facility]    [id number]

[facility]



        12:40:00 12:40:00                                         24      dennis Mart

 

        2019 Outpatient         [facility]    [facility]    346

[id number] 



        12:40:00 12:40:00                 Rosy                   Michael      



                                        Gabriela EDUARDO                         

 

        2019 Outpatient         [facility]    [facility]    346

[id number] 



        12:40:00 12:40:00                 RosyMichael      



                                        Gabriela EDUARDO                         

 

        2019 Ambulatory                 nullFlavo MG    [id number]

25756 [facility]



        15:30:00 15:30:00 Pre-Reg                 r       Internal 25      dennis Grant         

 

        2019 Ambulatory                 nullFlavo [facility]    [id number]

76840 [facility]



        15:30:00 15:30:00 Pre-Reg                 r       Internal 25      l



                                                        Manuel Palacioberg         

 

        2019 Outpatient                 IE    IE    [id number]

[facility]



        09:30:00 09:30:00                                         25      dennis Mart

 

        2019 Outpatient                 MG    MG    [id number]

365 



        09:30:00 09:30:00                                         25      

 

        2018 2018-10-20 Ambulatory                 nullFlavo MG    [id number]

[id number] [facility]



        12:45:00 12:45:00 Pre-Reg                 r       Internal 23      l



                                                        Medicine         Barron Palacioberg         

 

        2018 2018-10-20 Ambulatory                 nullFlavo MG    [id number]

66872 [facility]



        12:45:00 12:45:00 Pre-Reg                 r       Internal 23      l



                                                        Medicine         Barron Palacioberg         

 

        2018 2018-10-20 Outpatient                 MG    MG    [id number]

365 



        07:45:00 07:45:00                                         2018 Outpatient                 nullFlavo MG    [id number]

26057 [facility]



        19:15:00 04:59:59                         r       Nephrology 26      dennis martinez

 

        2018 Outpatient                 nullFlavo MG    [id number]

[id number] [facility]



        19:15:00 04:59:59                         r       Nephrology 26      dennis martinez

 

        2018 Outpatient                 nullFlavo MG    [id number]

81953 [facility]



        18:00:00 04:59:59                         r       Nephrology 21      dennsi martinez

 

        2018 Outpatient                 nullFlavo MG    [id number]

[id number] [facility]



        18:00:00 04:59:59                         r       Nephrology 21      dennis Hines

michelle

 

        2018 Outpatient         [facility]    MG    346

[id number] 



        14:15:00 23:59:59                 Daca,                   Tay      



                                        Gabriela CLARK                         

 

        2018 Outpatient         [facility]    MG    346

[id number] 



        13:00:00 23:59:59                 Dacalyssia                   Nela      



                                        Gabriela CLARK                         

 

        2018 Outpatient                 [facility]    IE    [id number]

[facility]



        14:15:00 14:15:00                                         26      dennis



                                                                        Barron

 

        2018 Outpatient                 [facility]    [facility]    [id number]

[facility]



        13:00:00 13:00:00                                         21      dennis



                                                                        Barron

 

        2018 Outpatient                 IE    IE    [id number]

[facility]



        07:45:00 07:45:00                                         23      dennis



                                                                        Barron

 

        2018 Outpatient                 nullFlavo [facility] Family 3

[id number] [facility]



        18:30:00 04:59:59                         r       Medicine 20      l



                                                        Rudy Hines

n

 

        2018 Outpatient                 nullFlavo [facility] Family 3

[id number] [facility]oria



        18:30:00 04:59:59                         r       Medicine 20      l



                                                        Rudy Hines

n

 

        2018 Outpatient         [place],  MG    MG    [id number]

365 



        13:30:00 23:59:59                 Charisse MARTINEZ                 20      

 

        2018 Outpatient                 [facility]    IE    [id number]

[facility]



        13:30:00 13:30:00                                         20      dennis



                                                                        Barron

 

        2018 Outpatient                 nullFlavo MG    [id number]

[id number] [facility]



        16:00:00 04:59:59                         r       Nephrology 19      l



                                                        Rudy Hines

michelle

 

        2018 Outpatient                 nullFlavo MG    [id number]

[id number] [facility]



        16:00:00 04:59:59                         r       Nephrology 19      dennis Hines

michelle

 

        2018 Outpatient         [facility]    MG    346

[id number] 



        11:00:00 23:59:59                 Daca,                   Toney CLARK                         

 

        2018 Outpatient         [facility]    MG    346

[id number] 



        11:00:00 23:59:59                 Daca,                   19      



                                        Gabriela CLARK                         

 

        2018 Outpatient                 IE    IE    [id number]

[facility]



        11:00:00 11:00:00                                         19      dennis



                                                                        Barron

 

        2018 Outpatient                 nullFlavo [facility] Family 3

[id number] [facility]



        15:00:00 04:59:59                         r       Medicine 18      l



                                                        Rudy Hines

michelle

 

        2018 Outpatient                 nullFlavo [facility] Family 3

[id number] [facility]



        15:00:00 04:59:59                         r       Medicine 18      l



                                                        Rudy Hines

n

 

        2018 Outpatient         [place],  [facility]    [facility]    [id number]

365 



        10:00:00 23:59:59                 Charisse N                 18      

 

        2018 Outpatient         [place],  [facility]    [facility]    [id number]

365 



        10:00:00 23:59:59                 Charisse N                 18      

 

        2018 Outpatient                 [facility]    [facility]    [id number]

[facility]



        10:00:00 10:00:00                                         18      dennis Mart

 

        2017 Outpatient                 [facility]    [facility]    [id number]

[facility]



        10:40:00 10:40:00                                         16      dennis Mart

 

        2017 Outpatient                 [facility]    [facility]    [id number]

[facility]



        10:40:00 10:40:00                                         16      dennis Mart

 

        2017 Outpatient                 [facility]    [facility]    [id number]

[facility]



        11:30:00 11:30:00                                         17      dennis Mart

 

        2017 Outpatient                 [facility]    [facility]    [id number]

[facility]



        11:30:00 11:30:00                                         17      dennis Mart

 

        2017 Outpatient                 [facility]    [facility]    [id number]

[facility]



        11:40:00 11:40:00                                         11      dennis Mart

 

        2017 Outpatient                 [facility]    [facility]    [id number]

[facility]



        11:40:00 11:40:00                                         11      dennis Mart

 

        2017 Outpatient                 [facility]    [facility]    [id number]

[facility]



        14:30:00 14:30:00                                         15      dennis Mart

 

        2017 Outpatient                 [facility]    [facility]    [id number]

[facility]



        14:30:00 14:30:00                                         15      dennis Mart

 

        2017 Outpatient                 [facility]    [facility]    [id number]

[facility]



        10:00:00 10:00:00                                         08      dennis Mart

 

        2017 Outpatient                 [facility]    [facility]    [id number]

[facility]



        10:00:00 10:00:00                                         08      dennis Mart

 

        2017 Bedded                  [facility] [id number]

375 [facility]



        11:48:35 14:30:00 Outpatient                 xuan       [place] 13      dennis Harper

nn

 

        2017 Bedded                  [facility] [id number]

375 [facility]



        11:48:35 14:30:00 Outpatient                 xuan Mart 13      dennis Harper

nn

 

        2017 Outpatient         CHELSEY CunninghamSDENNIS    SL    [id number]

[id number] 



        05:48:35 08:30:00                 Randy Velasco      

 

        2017 Outpatient                 [facility]    [facility]    [id number]

[facility]



        13:15:00 13:15:00                                         14      dennis Mart

 

        2017 Outpatient                 [facility]    [facility]    [id number]

[facility]



        13:15:00 13:15:00                                         14      dennis Mart

 

        2016 Outpatient                 [facility]    [facility]    [id number]

[facility]



        09:30:00 09:30:00                                         12      dennis Mart

 

        2016 Outpatient                 [facility]    [facility]    [id number]

[facility]



        09:30:00 09:30:00                                         13      dennis Mart

 

        2016 Outpatient                 [facility]    [facility]    [id number]

[facility]



        09:30:00 09:30:00                                         12      dennis Mart

 

        2016 Outpatient                 [facility]    [facility]    [id number]

[facility]



        09:30:00 09:30:00                                         13      dennis Mart

 

        2016 Outpatient                 [facility]    [facility]    [id number]

[facility]



        10:20:00 10:20:00                                         09      dennis Mart

 

        2016 Outpatient                 [facility]    [facility]    [id number]

[facility]



        10:20:00 10:20:00                                         09      dennis Mart

 

        2016 Outpatient                 [facility]    [facility]    [id number]

[facility]



        13:00:00 13:00:00                                         04      dennis Mart

 

        2016 Outpatient                 [facility]    [facility]    [id number]

[facility]



        13:00:00 13:00:00                                         04      dennis Mart

 

        2016 Outpatient                 [facility]    [facility]IE    [id number]

[facility]



        11:15:00 11:15:00                                         06      dennis Mart

 

        2016 Outpatient                 [facility]    [facility]    [id number]

365 [facility]



        11:15:00 11:15:00                                         06      dennis Mart

 

        2016 OP Therapy                 nullFlavo [facility]     [id number]

[id number] [facility]oria



        13:00:00 04:59:00 Patients                 xuan Mart

 

        2016 OP Therapy                 nullFlavo [facility]     [id number]

[id number] Memoria



        13:00:00 04:59:00 Patients                 xuan Rajput 03      dennis Mart

 

        2016 Outpatient         Lei,   2.16.840. 2.16.840.1. 3

[id number] 



        08:00:00 23:59:00                 Yoan M [id number]. [id number] 03    

  



                                                3.615.55 5.55            

 

        2016 OP Therapy                 nullFlavo [facility]     [id number]

49973 Memoria



        17:39:00 04:59:00 Patients                 xuan Mart

 

        2016 OP Therapy                 nullFlavo [facility]     [id number]

40328 Memoria



        17:39:00 04:59:00 Patients                 xuan Mart

 

        2016 Outpatient         Lei,   2.16.840. 2.16.840.1. 3

[id number] 



        12:39:00 23:59:00                 Yoan M [id number]. [id number] 02    

  



                                                3.615.55 5.55            

 

        2016 OP Therapy                 nullFlavo [facility] 346

[id number] Memoria



        19:00:00 04:59:00 Patients                 r       Daily         dennis Mart

 

        2016 OP Therapy                 nullFlavo [facility] 346

[id number] Memoria



        19:00:00 04:59:00 Patients                 r       Daily         dennis Mart

 

        2016 Outpatient         Lei,   2.16.840. 2.16.840.1. 3

[id number] 



        14:00:00 23:59:00                 Yoan M 1.701346. [id number] 01    

  



                                                3.615.69 5.69            

 

        2016 Outpt Diag                 nullFlavo [facility]    [id number]

20956 Memoria



        16:14:00 04:59:00 Services                 r       Outpatient 04      l



                                                        Imaging         [place]         

 

        2016 Outpt Diag                 nullFlavo [facility]    13325

67882 Memoria



        16:14:00 04:59:00 Services                 r       Outpatient 04      l



                                                        Imaging         [place]ron [place]         

 

        2016 Outpatient         [address]    346

[id number] 



        11:14:00 23:59:00                 Roland Gallegos                         

 

        2016 OP Therapy                 nullFlavo [facility] 346

[id number] Memoria



        19:00:00 04:59:00 Patients                 r       [place]   00      dennis Mart

 

        2016 OP Therapy                 nullFlavo [facility] [place] 346

[id number] Memoria



        19:00:00 04:59:00 Patients                 r       [place]   00      dennis



                                                                        [facility]

 

        2016 Outpatient         Lei,   2.16.840. 2.16.840.1. 3

[id number] 



        14:00:00 23:59:00                 Yoan CHAVIS 1.060312. 617839.3.61 00    

  



                                                3.615.69 5.69            

 

        2016 Outpatient                 [facility]    IE    [id number]

365 [facility]



        10:20:00 10:20:00                                         01      dennis Mart

 

        2016 Outpatient                 [facility]    [facility]    [id number]

365 [facility]



        10:20:00 10:20:00                                         01      dennis Mart

 

        2016-05-10 2016 Outpt Diag                 nullFlavo [facility]    [id number]

63360 [facility]oria



        14:59:00 04:59:00 Services                 r       Outpatient 03      l



                                                        Imaging         Barron Zarate            

 

        2016-05-10 2016 Outpt Diag                 nullFlavo [facility]    [id number]

57927 Memoria



        14:59:00 04:59:00 Services                 r       Outpatient 03      l



                                                        Chon Zarate            

 

        2016-05-10 2016-05-10 Outpatient         Abbi Somers 28    28    346

[id number] 



        09:59:00 23:59:00                 [place]                  03      

 

        2016 Outpt Diag                 nullFlavo [facility]    [id number]

74654 [facility]oria



        18:11:00 04:59:00 Services                 r       Outpatient 01      l



                                                        Imaging         [place]ron [place]         

 

        2016 Outpt Diag                 nullFlavo [facility]    [id number]

22021 Memoria



        18:11:00 04:59:00 Services                 r       Outpatient 01      l



                                                        Imaging         [place]ron [place]         

 

        2016 Outpatient         Abbi Somers 29    29    346

[id number] 



        13:11:00 23:59:00                 [place]                  01      

 

        2016-03-10 2016-03-10 Outpatient                 2.16.840. 2.16.840.1. 3

[id number]   [facility]



        12:50:31 19:25:00                         [id number]. 088841.3.20         

l



                                                3.2081.20 81.2000         Donny

n



                                                00                      [facility]



                                                                        l



                                                                        [facility]



                                                                        l [facility]



                                                                        [place]

 

        2016-03-10 2016-03-10 Outpatient                 nullFlavo [facility]    [id number]

   [facility]oria



        12:50:31 19:25:00                         r                       dennis Mart

 

        2016-03-10 2016-03-10 Outpatient                 nullFlavo [facility]    [id number]

   Memoria



        12:50:31 19:25:00                         r                       dennis Mart

 

        2016 2016-02-10 Outpt Diag                 nullFlavo [facility]    [id number]

[id number] Memoria



        12:58:00 05:59:00 Services                 r       Outpatient 00      l



                                                        Imaging         Barron



                                                        [place]         

 

        2016 2016-02-10 Outpt Diag                 nullFlavo [facility]    [id number]

[id number] [facility]oria



        12:58:00 05:59:00 Services                 r       Outpatient 00      l



                                                        Imaging         Barron



                                                        [place]         

 

        2016 Outpatient         Abbi Somers 29    MH29    346

[id number] 



        06:58:00 23:59:00                 [place]                  2016 Outpatient                 [facility]    [facility]    [id number]

365 [facility]



        10:15:00 10:15:00                                         02      dennis Mart

 

        2016 Outpatient                 [facility]    [facility]    [id number]

365 [facility]



        10:15:00 10:15:00                                         02      dennis Mart

 

        2015 Outpatient                 [facility]    [facility]    [id number]

365 [facility]



        11:30:00 11:30:00                                         00      dennis Mart

 

        2015 Outpatient                 [facility]    [facility]IE    [id number]

365 [facility]



        11:30:00 11:30:00                                         00      dennis Mart

 

        2014 [facility]                 [facility] 3467

2273_3 Memoria



        01:16:00 05:59:00                         r       Barron [id number] l



                                                        [place] 1       [facility]

 

        2014 Outpatient                 nullFlavo [facility] 3467

2273_3 Memoria



        01:16:00 05:59:00                         r       [place] [id number] l



                                                        [place] 1       [facility]

 

        2014 Outpatient         [facility] 2.16.840. 2.16.840.

1. [id number] 



        19:16:00 23:59:00                 , Vignesh 1.651593. 441453.3.61        

 



                                        Caitlin  3.615.0.1 5.0.100         



                                                00                      

 

        2014 Outpatient                 nullFlavo       [id number]

[id number] Mem[facility]



        19:16:00 19:16:00                         r       [place] 01      l



                                                                        [facility]

 

        2014 Outpatient                 nullFlavo       [id number]

[id number] [facility]



        19:16:00 19:16:00                         r       [place] 01      l



                                                                        [facility]

 

        2014 Outpatient                 nullFlavo       [id number]

96563 [facility]



        19:43:00 19:43:00                         r       [place] 00      l



                                                                        [place]

 

        2014 [facility]                 [facility]       [id number]

13584 [facility]



        19:43:00 19:43:00                         r       [place] 00      l



                                                                        Barron







Results







           Test Description Test Time  Test Comments Results    Result     Sour

e



                                                       Comments   

 

           PET/CT, CARDIAC 2022 Reason for ************************       

     



           PERF REST AND 16:05:00   exam:->angina ************************      

      



           STRESS                           ************[facility] CENTERName:            



                                            SUZI SEARSAMILCAR  :            



                                            1955 Sex:            



                                            F***********************            



                                            ************************            



                                            *************FINAL            



                                            REPORT PATIENT ID:            



                                            [id number] PROCEDURE:            



                                            MYOCARDIAL PERFUSION            



                                            PET/CT IMAGING            



                                            (Rest/Stress)CPT CODE:            



                                            98682 INDICATION:            



                                            Evaluation for chest            



                                            pain CARDIOVASCULAR            



                                            PROFILE:CAD History:            



                                            NoneSymptoms: Chest            



                                            painRisk Factors: Atrial            



                                            fibrillation, ESRDBMI:            



                                            34 STRESS             



                                            PROTOCOL:Pharmacologic            



                                            stress was achieved with            



                                            a 10-second intravenous            



                                            infusion of regadenoson            



                                            0.4 mg. The            



                                            radiopharmaceutical was            



                                            administered 30 seconds            



                                            after the start of the            



                                            regadenoson infusion.            



                                            IMAGING PROTOCOL:Limited            



                                            low-dose CT imaging was            



                                            performed for            



                                            attenuation correction.            



                                            39.9 mCi of Rb-82            



                                            chloride was injected            



                                            intravenously at rest,            



                                            and gated PET images            



                                            were obtained. Then,            



                                            39.9 mCi of Rb-82            



                                            chloride was injected            



                                            intravenously at peak            



                                            stress, and gated PET            



                                            images were obtained.            



                                            Image quality is good.            



                                            REST FINDINGS:HR:            



                                            79/minBP: 123/65            



                                            mmHgPrelim. EKG: Atrial            



                                            fibrillation with            



                                            incomplete RBBB and            



                                            nonspecific ST            



                                            changes.Perfusion:            



                                            Normal.Wall Motion:            



                                            Normal (LVEF >70%).LV            



                                            Volume: Normal. STRESS            



                                            FINDINGS:HR: 96/min (62%            



                                            of MPHR)BP: 116/70            



                                            mmHgPrelim. EKG: No            



                                            ischemic              



                                            changes.Symptoms:            



                                            Dyspnea (treatment not            



                                            required).Perfusion:            



                                            Normal.Wall Motion:            



                                            Normal (LVEF >70%).LV            



                                            Volume: Not            



                                            significantly changed            



                                            from rest. IMPRESSION:1.            



                                            Normal study.2. Normal            



                                            myocardial perfusion.3.            



                                            Normal resting LVEF,            



                                            which does not            



                                            deteriorate with            



                                            pharmacologic stress.4.            



                                            Normal extracardiac            



                                            tracer distribution.5.            



                                            There is no prior study            



                                            for comparison. Signed:            



                                            Reggie Gudino [facility]            



                                            Verified Date/Time:            



                                            2022 16:05:42            



                                            Electronically signed            



                                            by: REGGIE GUDINO MD on            



                                            2022 04:05 PM            









                    BASIC METABOLIC PANEL 2022 06:01:00 









                      Test Item  Value      Reference Range Interpretation Comme

nts









             SODIUM (BEAKER) (test 140 meq/L    136-145                   



             code = 381)                                         

 

             POTASSIUM (BEAKER) 4.8 meq/L    3.5-5.1                   Specimen 

slightly hemolyzed



             (test code = 379)                                        

 

             CHLORIDE (BEAKER) (test 107 meq/L                        



             code = 382)                                         

 

             CO2 (BEAKER) (test code 21 meq/L     22-29        L            



             = 355)                                              

 

             BLOOD UREA NITROGEN 22 mg/dL     7-21         H            



             (BEAKER) (test code =                                        



             354)                                                

 

             CREATININE (BEAKER) 5.15 mg/dL   0.57-1.25    H            Specimen

 slightly hemolyzed



             (test code = 358)                                        

 

             GLUCOSE RANDOM (BEAKER) 97 mg/dL                         



             (test code = 652)                                        

 

             CALCIUM (BEAKER) (test 9.1 mg/dL    8.4-10.2                  



             code = 697)                                         

 

             EGFR (BEAKER) (test 9 mL/min/1.73 sq m                            I

nterpretation of eGFR values



             code = 1092)                                        Stage Descripti

on Result G1



                                                                 Normal or high 

>=90 G2 Mildly



                                                                 decreased 60-89

 G3a Mildly to



                                                                 moderately 45-5

9 G3b Moderately



                                                                 to severely 30-

44 G4 Severly



                                                                 decreased 15-29

 G5 Kidney



                                                                 failure <15Repo

rted eGFR is



                                                                 based on the CK

D-EPI 



                                                                 equation that d

oes not use a



                                                                 race coefficien

tEstimated GFR is



                                                                 not as accurate

 as Creatinine



                                                                 Clearance in pr

edicting



                                                                 glomerular filt

ration rate.



                                                                 Estimated GFR i

s not applicable



                                                                 for dialysis seng miller



 ID - MARCOSpecimen slightly [id number] 05:55:08





             Test Item    Value        Reference Range Interpretation Comments

 

             MAGNESIUM (BEAKER) 1.9 mg/dL    1.6-2.6                   Specimen 

slightly



             (test code = 627)                                        hemolyzed



 ID - [id number] 05:55:08





             Test Item    Value        Reference Range Interpretation Comments

 

             PHOSPHORUS (BEAKER) 3.4 mg/dL    2.3-4.7                   Specimen

 slightly



             (test code = 604)                                        hemolyzed



 ID - MARCOCBC W/PLT COUNT &amp; AUTO [id number] 04:43:14





             Test Item    Value        Reference Range Interpretation Comments

 

             WHITE BLOOD CELL COUNT 6.7 K/ L     3.5-10.5                  



             (BEAKER) (test code =                                        



             775)                                                

 

             RED BLOOD CELL COUNT 2.94 M/ L    3.93-5.22    L            



             (BEAKER) (test code =                                        



             761)                                                

 

             HEMOGLOBIN (BEAKER) 9.1 GM/DL    11.2-15.7    L            



             (test code = 410)                                        

 

             HEMATOCRIT (BEAKER) 30.4 %       34.1-44.9    L            



             (test code = 411)                                        

 

             MEAN CORPUSCULAR 103 fL       79-95        H            Discordant 

MCV



             VOLUME (BEAKER) (test                                        result

s compared to



             code = 753)                                         previous result

s;



                                                                 clinical correl

ation



                                                                 required.

 

             MEAN CORPUSCULAR 31.0 pg      25.6-32.2                 



             HEMOGLOBIN (BEAKER)                                        



             (test code = 751)                                        

 

             MEAN CORPUSCULAR 29.9 GM/DL   32.2-35.5    L            



             HEMOGLOBIN CONC                                        



             (BEAKER) (test code =                                        



             752)                                                

 

             RED CELL DISTRIBUTION 14.2 %       11.7-14.4                 



             WIDTH (BEAKER) (test                                        



             code = 412)                                         

 

             PLATELET COUNT 84 K/CU MM   150-450      L            



             (BEAKER) (test code =                                        



             756)                                                

 

             MEAN PLATELET VOLUME 10.3 fL      9.4-12.3                  



             (BEAKER) (test code =                                        



             754)                                                

 

             NUCLEATED RED BLOOD 0 /100 WBC   0-0                       



             CELLS (BEAKER) (test                                        



             code = 413)                                         

 

             NEUTROPHILS RELATIVE 75 %                                   



             PERCENT (BEAKER) (test                                        



             code = 429)                                         

 

             LYMPHOCYTES RELATIVE 16 %                                   



             PERCENT (BEAKER) (test                                        



             code = 430)                                         

 

             MONOCYTES RELATIVE 7 %                                    



             PERCENT (BEAKER) (test                                        



             code = 431)                                         

 

             EOSINOPHILS RELATIVE 2 %                                    



             PERCENT (BEAKER) (test                                        



             code = 432)                                         

 

             BASOPHILS RELATIVE 0 %                                    



             PERCENT (BEAKER) (test                                        



             code = 437)                                         

 

             NEUTROPHILS ABSOLUTE 5.05 K/ L    1.56-6.13                 



             COUNT (BEAKER) (test                                        



             code = 670)                                         

 

             LYMPHOCYTES ABSOLUTE 1.05 K/ L    1.18-3.74    L            



             COUNT (BEAKER) (test                                        



             code = 414)                                         

 

             MONOCYTES ABSOLUTE 0.44 K/ L    0.24-0.36    H            



             COUNT (BEAKER) (test                                        



             code = 415)                                         

 

             EOSINOPHILS ABSOLUTE 0.10 K/ L    0.04-0.36                 



             COUNT (BEAKER) (test                                        



             code = 416)                                         

 

             BASOPHILS ABSOLUTE 0.02 K/ L    0.01-0.08                 



             COUNT (BEAKER) (test                                        



             code = 417)                                         

 

             IMMATURE     0.60 %       0.00-1.00                 



             GRANULOCYTES-RELATIVE                                        



             PERCENT (BEAKER) (test                                        



             code = 2801)                                        



PROTEIN ELECTROPHORESIS, SERUM WITH REFLEX TO [id number] 16:42:33





             Test Item    Value        Reference Range Interpretation Comments

 

             ALBUMIN FRACTION 3.4 gm/dL    3.5-5.5      L            



             (BEAKER) (test code =                                        



             405)                                                

 

             ALPHA 1 FRACTION 0.4 gm/dL    0.2-0.4                   



             (BEAKER) (test code =                                        



             389)                                                

 

             ALPHA 2 FRACTION 0.7 gm/dL    0.4-1.0                   



             (BEAKER) (test code =                                        



             390)                                                

 

             BETA FRACTION 0.6 gm/dL    0.5-1.1                   



             (BEAKER) (test code =                                        



             392)                                                

 

             GAMMA GLOBULIN 0.8 gm/dL    0.7-1.6                   



             FRACTION (BEAKER)                                        



             (test code = 391)                                        

 

             INTERPRETATION-119 Albumin decreased. This                         

  



             (BEAKER) (test code = may indicate protein                         

  



             2615)        malnutrition or a                           



                          protein losing state.                           

 

             [id number] Meredith Reyes, MD                           



             (BEAKER) (test code = (electronic signature)                       

    



             2616)                                               

 

             PROTEIN TOTAL SERUM, 6.0 gm/dL    6.0-8.3                   



             SPEP (BEAKER) (test                                        



             code = 2660)                                        



Clinical  - SFOperator ID - CAITLYN BOperator ID - ADMPERIPHERAL BLOOD SMEAR
- PATHOLOGIST [id number] 14:33:15





             Test Item    Value        Reference Range Interpretation Comments

 

             PERIPHERAL SMR REVIEW Cell counts confirmed.                       

    



             (BEAKER) (test code = There is macrocytic                          

 



             2640)        anemia. Platelets are                           



                          decreased with no                           



                          significant platelet                           



                          clumps or satellitism                           



                          identified.                            

 

             [id number] Refugiokeerthi Jesus Manuel,                          

 



             (BEAKER) (test code = M.D.                                   



             2849)                                               



HEPATITIS B SURFACE [id number] 10:29:09





             Test Item    Value        Reference Range Interpretation Comments

 

             HEPATITIS B SURFACE ANTIGEN (2) Nonreactive  Nonreactive           

    



             (BEAKER) (test code = 2585)                                        



Specimen is considered negative for HBsAg.Transthoracic 2D echo w/ doppler 
(cw/pw/color)2022 08:20:07Ejection FractionSLEH ECHO HEARTLAB Consc[facility] BoxCKESSKaiser HospitalTransthoracic 2D echo w/ doppler (cw/pw/color)
2022 08:20:07Ejection FractionSLE ECHO HEARTLAB [facility] w/ doppler (cw/pw/color)2022 
08:20:07Ejection [facility]ractionSLE [facility]CHO [facility]color[facility]2022 08:20:07Ejection 
FractionSLE ECHO [facility]ARTLAB [facility]
[id number] 05:41:18





             Test Item    Value        Reference Range Interpretation Comments

 

             HEPATITIS C ANTIBODY (BEAKER) Nonreactive  Nonreactive             

  



             (test code = 367)                                        



 ID - ROSALINOBASIC METABOLIC [id number] 05:22:16





             Test Item    Value        Reference Range Interpretation Comments

 

             SODIUM (BEAKER) 142 meq/L    136-145                   



             (test code = 381)                                        

 

             POTASSIUM    4.5 meq/L    3.5-5.1                   



             (BEAKER) (test                                        



             code = 379)                                         

 

             CHLORIDE (BEAKER) 109 meq/L           H            



             (test code = 382)                                        

 

             CO2 (BEAKER) 21 meq/L     22-29        L            



             (test code = 355)                                        

 

             BLOOD UREA   37 mg/dL     7-21         H            



             NITROGEN (BEAKER)                                        



             (test code = 354)                                        

 

             CREATININE   8.14 mg/dL   0.57-1.25    H            



             (BEAKER) (test                                        



             code = 358)                                         

 

             GLUCOSE RANDOM 99 mg/dL                         



             (BEAKER) (test                                        



             code = 652)                                         

 

             CALCIUM (BEAKER) 9.0 mg/dL    8.4-10.2                  



             (test code = 697)                                        

 

             EGFR (BEAKER) 5                                       Interpretatio

n of eGFR



             (test code = mL/min/1.73                            values Stage De

scription



             1092)        sq m                                   Result G1 Jeanette

l or high



                                                                 >=90 G2 Mildly 

decreased



                                                                 60-89 G3a Mildl

y to



                                                                 moderately 45-5

9 G3b



                                                                 Moderately to s

everely



                                                                 30-44 G4 Severl

y decreased



                                                                 15-29 G5 Kidney

 failure



                                                                 <15Reported eGF

R is based



                                                                 on the CKD-EPI 

1



                                                                 equation that d

oes not use



                                                                 a race



                                                                 coefficientEsti

mated GFR



                                                                 is not as accur

ate as



                                                                 Creatinine Mable acuna in



                                                                 predicting glom

erular



                                                                 filtration rate

. Estimated



                                                                 GFR is not appl

icable for



                                                                 dialysis patien

ts



 ID - [id number] 05:21:47





             Test Item    Value        Reference Range Interpretation Comments

 

             MAGNESIUM (BEAKER) (test code = 2.0 mg/dL    1.6-2.6               

    



             627)                                                



 ID - [id number] 05:21:47





             Test Item    Value        Reference Range Interpretation Comments

 

             PHOSPHORUS (BEAKER) (test code = 4.8 mg/dL    2.3-4.7      H       

     



             604)                                                



 ID - ROSALINOCBC W/PLT COUNT &amp; AUTO [id number] 05:05:48





             Test Item    Value        Reference Range Interpretation Comments

 

             WHITE BLOOD CELL COUNT (BEAKER) 7.0 K/ L     3.5-10.5              

    



             (test code = 775)                                        

 

             RED BLOOD CELL COUNT (BEAKER) 3.00 M/ L    3.93-5.22    L          

  



             (test code = 761)                                        

 

             HEMOGLOBIN (BEAKER) (test code = 9.4 GM/DL    11.2-15.7    L       

     



             410)                                                

 

             HEMATOCRIT (BEAKER) (test code = 32.0 %       34.1-44.9    L       

     



             411)                                                

 

             MEAN CORPUSCULAR VOLUME (BEAKER) 107 fL       79-95        H       

     



             (test code = 753)                                        

 

             MEAN CORPUSCULAR HEMOGLOBIN 31.3 pg      25.6-32.2                 



             (BEAKER) (test code = 751)                                        

 

             MEAN CORPUSCULAR HEMOGLOBIN CONC 29.4 GM/DL   32.2-35.5    L       

     



             (BEAKER) (test code = 752)                                        

 

             RED CELL DISTRIBUTION WIDTH 14.4 %       11.7-14.4                 



             (BEAKER) (test code = 412)                                        

 

             PLATELET COUNT (BEAKER) (test code 84 K/CU MM   150-450      L     

       



             = 756)                                              

 

             MEAN PLATELET VOLUME (BEAKER) 10.2 fL      9.4-12.3                

  



             (test code = 754)                                        

 

             NUCLEATED RED BLOOD CELLS (BEAKER) 0 /100 WBC   0-0                

       



             (test code = 413)                                        

 

             NEUTROPHILS RELATIVE PERCENT 81 %                                  

 



             (BEAKER) (test code = 429)                                        

 

             LYMPHOCYTES RELATIVE PERCENT 12 %                                  

 



             (BEAKER) (test code = 430)                                        

 

             MONOCYTES RELATIVE PERCENT 5 %                                    



             (BEAKER) (test code = 431)                                        

 

             EOSINOPHILS RELATIVE PERCENT 1 %                                   

 



             (BEAKER) (test code = 432)                                        

 

             BASOPHILS RELATIVE PERCENT 0 %                                    



             (BEAKER) (test code = 437)                                        

 

             NEUTROPHILS ABSOLUTE COUNT 5.69 K/ L    1.56-6.13                 



             (BEAKER) (test code = 670)                                        

 

             LYMPHOCYTES ABSOLUTE COUNT 0.84 K/ L    1.18-3.74    L            



             (BEAKER) (test code = 414)                                        

 

             MONOCYTES ABSOLUTE COUNT (BEAKER) 0.35 K/ L    0.24-0.36           

      



             (test code = 415)                                        

 

             EOSINOPHILS ABSOLUTE COUNT 0.08 K/ L    0.04-0.36                 



             (BEAKER) (test code = 416)                                        

 

             BASOPHILS ABSOLUTE COUNT (BEAKER) 0.02 K/ L    0.01-0.08           

      



             (test code = 417)                                        

 

             IMMATURE GRANULOCYTES-RELATIVE 0.60 %       0.00-1.00              

   



             PERCENT (BEAKER) (test code =                                      

  



             2801)                                               



BASIC METABOLIC [id number] 05:39:02





             Test Item    Value        Reference Range Interpretation Comments

 

             SODIUM (BEAKER) 141 meq/L    136-145                   



             (test code = 381)                                        

 

             POTASSIUM    5.0 meq/L    3.5-5.1                   



             (BEAKER) (test                                        



             code = 379)                                         

 

             CHLORIDE (BEAKER) 108 meq/L           H            



             (test code = 382)                                        

 

             CO2 (BEAKER) 22 meq/L     22-29                     



             (test code = 355)                                        

 

             BLOOD UREA   25 mg/dL     7-21         H            



             NITROGEN (BEAKER)                                        



             (test code = 354)                                        

 

             CREATININE   6.82 mg/dL   0.57-1.25    H            



             (BEAKER) (test                                        



             code = 358)                                         

 

             GLUCOSE RANDOM 102 mg/dL                        



             (BEAKER) (test                                        



             code = 652)                                         

 

             CALCIUM (BEAKER) 9.3 mg/dL    8.4-10.2                  



             (test code = 697)                                        

 

             EGFR (BEAKER) 6                                       Interpretatio

n of eGFR



             (test code = mL/min/1.73                            values Stage De

scription



             1092)        sq m                                   Result G1 Jeanette

l or high



                                                                 >=90 G2 Mildly 

decreased



                                                                 60-89 G3a Mildl

y to



                                                                 moderately 45-5

9 G3b



                                                                 Moderately to s

everely



                                                                 30-44 G4 Severl

y decreased



                                                                 15-29 G5 Kidney

 failure



                                                                 <15Reported eGF

R is based



                                                                 on the CKD-EPI 





                                                                 equation that d

oes not use



                                                                 a race



                                                                 coefficientEsti

mated GFR



                                                                 is not as accur

ate as



                                                                 Creatinine Mable acuna in



                                                                 predicting glom

erular



                                                                 filtration rate

. Estimated



                                                                 GFR is not appl

icable for



                                                                 dialysis patien

ts



 ID - CHERELLE [id number] 05:34:03





             Test Item    Value        Reference Range Interpretation Comments

 

             MAGNESIUM (BEAKER) (test code = 1.9 mg/dL    1.6-2.6               

    



             627)                                                



 ID - CHERELLE [id number] 05:34:03





             Test Item    Value        Reference Range Interpretation Comments

 

             PHOSPHORUS (BEAKER) (test code = 5.9 mg/dL    2.3-4.7      H       

     



             604)                                                



 ID Corinna VILLARREAL WCBC W/PLT COUNT &amp; AUTO [id number] 04:51:11





             Test Item    Value        Reference Range Interpretation Comments

 

             WHITE BLOOD CELL COUNT (BEAKER) 7.5 K/ L     3.5-10.5              

    



             (test code = 775)                                        

 

             RED BLOOD CELL COUNT (BEAKER) 2.88 M/ L    3.93-5.22    L          

  



             (test code = 761)                                        

 

             HEMOGLOBIN (BEAKER) (test code = 9.2 GM/DL    11.2-15.7    L       

     



             410)                                                

 

             HEMATOCRIT (BEAKER) (test code = 30.6 %       34.1-44.9    L       

     



             411)                                                

 

             MEAN CORPUSCULAR VOLUME (BEAKER) 106 fL       79-95        H       

     



             (test code = 753)                                        

 

             MEAN CORPUSCULAR HEMOGLOBIN 31.9 pg      25.6-32.2                 



             (BEAKER) (test code = 751)                                        

 

             MEAN CORPUSCULAR HEMOGLOBIN CONC 30.1 GM/DL   32.2-35.5    L       

     



             (BEAKER) (test code = 752)                                        

 

             RED CELL DISTRIBUTION WIDTH 14.3 %       11.7-14.4                 



             (BEAKER) (test code = 412)                                        

 

             PLATELET COUNT (BEAKER) (test code 87 K/CU MM   150-450      L     

       



             = 756)                                              

 

             MEAN PLATELET VOLUME (BEAKER) 10.4 fL      9.4-12.3                

  



             (test code = 754)                                        

 

             NUCLEATED RED BLOOD CELLS (BEAKER) 0 /100 WBC   0-0                

       



             (test code = 413)                                        

 

             NEUTROPHILS RELATIVE PERCENT 77 %                                  

 



             (BEAKER) (test code = 429)                                        

 

             LYMPHOCYTES RELATIVE PERCENT 15 %                                  

 



             (BEAKER) (test code = 430)                                        

 

             MONOCYTES RELATIVE PERCENT 5 %                                    



             (BEAKER) (test code = 431)                                        

 

             EOSINOPHILS RELATIVE PERCENT 1 %                                   

 



             (BEAKER) (test code = 432)                                        

 

             BASOPHILS RELATIVE PERCENT 0 %                                    



             (BEAKER) (test code = 437)                                        

 

             NEUTROPHILS ABSOLUTE COUNT 5.82 K/ L    1.56-6.13                 



             (BEAKER) (test code = 670)                                        

 

             LYMPHOCYTES ABSOLUTE COUNT 1.13 K/ L    1.18-3.74    L            



             (BEAKER) (test code = 414)                                        

 

             MONOCYTES ABSOLUTE COUNT (BEAKER) 0.39 K/ L    0.24-0.36    H      

      



             (test code = 415)                                        

 

             EOSINOPHILS ABSOLUTE COUNT 0.10 K/ L    0.04-0.36                 



             (BEAKER) (test code = 416)                                        

 

             BASOPHILS ABSOLUTE COUNT (BEAKER) 0.03 K/ L    0.01-0.08           

      



             (test code = 417)                                        

 

             IMMATURE GRANULOCYTES-RELATIVE 0.70 %       0.00-1.00              

   



             PERCENT (BIANKA) (test code =                                      

  



             2801)                                               



HIGH SENSITIVITY TROPONIN -[id number] 18:47:20





             Test Item    Value        Reference Range Interpretation Comments

 

             HIGH SENSITIVITY 22 pg/ml     See_Comment  H             [Automated

 message]



             TROPONIN I (test code =                                        The 

system which



             8325623)                                            generated this 

result



                                                                 transmitted ref

erence



                                                                 range: <=17. Th

e



                                                                 reference range

 was



                                                                 not used to int

erpret



                                                                 this result as



                                                                 normal/abnormal

.



 ID - CAITLYN BThe  STAT High Sensitivity Troponin-I results 
should be used in conjunction with other diagnostic information such as ECG, 
clinical observations and information, and patient symptoms to aid in the 
diagnosis of MI.[id number] 18:35:03





             Test Item    Value        Reference Range Interpretation Comments

 

             POTASSIUM (BIANKA) (test code = 3.1 meq/L    3.5-5.1      L        

    



             379)                                                



 ID - CAITLYN BABAK, CHEST, 1 VIEW, NON [id number] 16:19:00Reason for 
exam:-&gt;pulm edemaShould this be performed at the bedside?-&gt;Yes
************************************************************[facility] CENTERName: SUZI SEARS : 1955 Sex: 
F************************************************************FINAL REPORT 
PATIENT ID: [id number] RAD, CHEST, 1 VIEW, NON DEPT INDICATION: pulm edema 
COMPARISON: None FINDINGS: Portable frontal view of the chest. IMPRESSION: 
Support Lines: Dialysis catheter tip overlies the right atrium. Lungs and 
pleura: Lungs are hypoinflated but otherwise unremarkable. No significant 
pulmonary edema or pleural effusions. No significant pneumothorax. Heart and 
mediastinum: Normal contours. Additional findings: None. Signed: Percy, Olga
MDReport Verified Date/Time: 2022 16:19:33 Electronically signed by: OLGA PEREZ MD on 2022 04:19 [id number] 16:00:35





             Test Item    Value        Reference Range Interpretation Comments

 

             THYROID STIMULATING HORMONE 1.121 uIU/mL 0.350-4.940               



             (BEAKER) (test code = 772)                                        



 ID - CAITLYN BHIGH SENSITIVITY TROPONIN -[id number] 15:45:53





             Test Item    Value        Reference Range Interpretation Comments

 

             HIGH SENSITIVITY 23 pg/ml     See_Comment  H             [Automated

 message]



             TROPONIN I (test code =                                        The 

system which



             5118066)                                            generated this 

result



                                                                 transmitted ref

erence



                                                                 range: <=17. Th

e



                                                                 reference range

 was



                                                                 not used to int

erpret



                                                                 this result as



                                                                 normal/abnormal

.



 ID - CAITLYN BThe  STAT High Sensitivity Troponin-I results 
should be used in conjunction with other diagnostic information such as ECG, 
clinical observations and information, and patient symptoms to aid in the 
diagnosis of MI.B-TYPE NATRIURETIC FACTOR (BNP)2022 15:45:53





             Test Item    Value        Reference Range Interpretation Comments

 

             B-TYPE NATRIURETIC PEPTIDE (BEAKER) 395 pg/mL    0-100        H    

        



             (test code = 700)                                        



 ID - CAITLYN BCOMPREHENSIVE METABOLIC [id number] 15:42:17





             Test Item    Value        Reference Range Interpretation Comments

 

             TOTAL PROTEIN 6.2 gm/dL    6.0-8.3                   



             (BEAKER) (test                                        



             code = 770)                                         

 

             ALBUMIN (BEAKER) 3.5 g/dL     3.5-5.0                   



             (test code = 1145)                                        

 

             ALKALINE     109 U/L                          



             PHOSPHATASE                                         



             (BEAKER) (test                                        



             code = 346)                                         

 

             BILIRUBIN TOTAL 1.2 mg/dL    0.2-1.2                   



             (BEAKER) (test                                        



             code = 377)                                         

 

             SODIUM (BEAKER) 141 meq/L    136-145                   



             (test code = 381)                                        

 

             POTASSIUM (BEAKER) 3.1 meq/L    3.5-5.1      L            



             (test code = 379)                                        

 

             CHLORIDE (BEAKER) 105 meq/L                        



             (test code = 382)                                        

 

             CO2 (BEAKER) (test 25 meq/L     22-29                     



             code = 355)                                         

 

             BLOOD UREA   19 mg/dL     7-21                      



             NITROGEN (BEAKER)                                        



             (test code = 354)                                        

 

             CREATININE   5.92 mg/dL   0.57-1.25    H            



             (BEAKER) (test                                        



             code = 358)                                         

 

             GLUCOSE RANDOM 109 mg/dL           H            



             (BEAKER) (test                                        



             code = 652)                                         

 

             CALCIUM (BEAKER) 9.0 mg/dL    8.4-10.2                  



             (test code = 697)                                        

 

             AST (SGOT)   11 U/L       5-34                      



             (BEAKER) (test                                        



             code = 353)                                         

 

             ALT (SGPT)   11 U/L       6-55                      



             (BEAKER) (test                                        



             code = 347)                                         

 

             EGFR (BEAKER) 7                                       Interpretatio

n of eGFR



             (test code = 1092) mL/min/1.73                            values St

age Description



                          sq m                                   Result G1 Jeanette

l or high



                                                                 >=90 G2 Mildly 

decreased



                                                                 60-89 G3a Mildl

y to



                                                                 moderately 45-5

9 G3b



                                                                 Moderately to s

everely



                                                                 30-44 G4 Severl

y decreased



                                                                 15-29 G5 Kidney

 failure



                                                                 <15Reported eGF

R is based



                                                                 on the CKD-EPI 





                                                                 equation that d

oes not use



                                                                 a race



                                                                 coefficientEsti

mated GFR



                                                                 is not as accur

ate as



                                                                 Creatinine Mable

kalpana in



                                                                 predicting glom

erular



                                                                 filtration rate

. Estimated



                                                                 GFR is not appl

icable for



                                                                 dialysis patien

ts



 ID - CAITLYN [id number] 15:39:33





             Test Item    Value        Reference Range Interpretation Comments

 

             MAGNESIUM (BEAKER) (test code = 1.9 mg/dL    1.6-2.6               

    



             627)                                                



 ID - CAITLYN [id number] 15:39:33





             Test Item    Value        Reference Range Interpretation Comments

 

             PHOSPHORUS (BEAKER) (test code = 5.0 mg/dL    2.3-4.7      H       

     



             604)                                                



 ID - CAITLYN BCBC W/PLT COUNT &amp; AUTO [id number] 15:23:08





             Test Item    Value        Reference Range Interpretation Comments

 

             WHITE BLOOD CELL COUNT (BEAKER) 5.6 K/ L     3.5-10.5              

    



             (test code = 775)                                        

 

             RED BLOOD CELL COUNT (BEAKER) 2.85 M/ L    3.93-5.22    L          

  



             (test code = 761)                                        

 

             HEMOGLOBIN (BEAKER) (test code = 8.9 GM/DL    11.2-15.7    L       

     



             410)                                                

 

             HEMATOCRIT (BEAKER) (test code = 29.9 %       34.1-44.9    L       

     



             411)                                                

 

             MEAN CORPUSCULAR VOLUME (BEAKER) 105 fL       79-95        H       

     



             (test code = 753)                                        

 

             MEAN CORPUSCULAR HEMOGLOBIN 31.2 pg      25.6-32.2                 



             (BEAKER) (test code = 751)                                        

 

             MEAN CORPUSCULAR HEMOGLOBIN CONC 29.8 GM/DL   32.2-35.5    L       

     



             (BEAKER) (test code = 752)                                        

 

             RED CELL DISTRIBUTION WIDTH 14.0 %       11.7-14.4                 



             (BEAKER) (test code = 412)                                        

 

             PLATELET COUNT (BEAKER) (test code 67 K/CU MM   150-450      L     

       



             = 756)                                              

 

             MEAN PLATELET VOLUME (BEAKER) 9.6 fL       9.4-12.3                

  



             (test code = 754)                                        

 

             NUCLEATED RED BLOOD CELLS (BEAKER) 0 /100 WBC   0-0                

       



             (test code = 413)                                        

 

             NEUTROPHILS RELATIVE PERCENT 76 %                                  

 



             (BEAKER) (test code = 429)                                        

 

             LYMPHOCYTES RELATIVE PERCENT 15 %                                  

 



             (BEAKER) (test code = 430)                                        

 

             MONOCYTES RELATIVE PERCENT 7 %                                    



             (BEAKER) (test code = 431)                                        

 

             EOSINOPHILS RELATIVE PERCENT 1 %                                   

 



             (BEAKER) (test code = 432)                                        

 

             BASOPHILS RELATIVE PERCENT 0 %                                    



             (BEAKER) (test code = 437)                                        

 

             NEUTROPHILS ABSOLUTE COUNT 4.27 K/ L    1.56-6.13                 



             (BEAKER) (test code = 670)                                        

 

             LYMPHOCYTES ABSOLUTE COUNT 0.86 K/ L    1.18-3.74    L            



             (BEAKER) (test code = 414)                                        

 

             MONOCYTES ABSOLUTE COUNT (BEAKER) 0.37 K/ L    0.24-0.36    H      

      



             (test code = 415)                                        

 

             EOSINOPHILS ABSOLUTE COUNT 0.05 K/ L    0.04-0.36                 



             (BEAKER) (test code = 416)                                        

 

             BASOPHILS ABSOLUTE COUNT (BEAKER) 0.02 K/ L    0.01-0.08           

      



             (test code = 417)                                        

 

             IMMATURE GRANULOCYTES-RELATIVE 0.50 %       0.00-1.00              

   



             PERCENT (BEAKER) (test code =                                      

  



             2801)                                               



ECG 12 [id number] 22:49:39





             Test Item    Value        Reference Range Interpretation Comments

 

             Ventricular rate (test                                        



             code = 253)                                         

 

             QRSD interval (test                                        



             code = 260)                                         

 

             QT interval (test code                                        



             = 264)                                              

 

             QTC interval (test code                                        



             = 265)                                              

 

             QRS axis 1 (test code =                                        



             268)                                                

 

             T wave axis (test code                                        



             = 270)                                              

 

             EKG impression (test Atrial                                 



             code = 273)  fibrillation-Rightward                           



                          axis-ST & T wave                           



                          abnormality, consider                           



                          inferior                               



                          ischemia-Abnormal                           



                          ECG-In automated                           



                          comparison with ECG of                           



                          2022 02:20,-QRS                           



                          axis shifted                           



                          right-Electronically                           



                          Signed By Moris WIGGINS,                           



                          Neftali K. () on                           



                          2022 4:49:37 PM                           



[facility] 12 [id number] 22:49:39





             Test Item    Value        Reference Range Interpretation Comments

 

             Ventricular rate (test                                        



             code = 253)                                         

 

             QRSD interval (test                                        



             code = 260)                                         

 

             QT interval (test code                                        



             = 264)                                              

 

             QTC interval (test code                                        



             = 265)                                              

 

             QRS axis 1 (test code =                                        



             268)                                                

 

             T wave axis (test code                                        



             = 270)                                              

 

             EKG impression (test Atrial                                 



             code = 273)  fibrillation-Rightward                           



                          axis-ST & T wave                           



                          abnormality, consider                           



                          inferior                               



                          ischemia-Abnormal                           



                          ECG-In automated                           



                          comparison with ECG of                           



                          2022 02:20,-QRS                           



                          axis shifted                           



                          right-Electronically                           



                          Signed By Moris WIGGINS,                           



                          Neftali K. () on                           



                          2022 4:49:37 PM                           



[facility] Respiratory specimen 
by EDWARD with probe [id number] 04:29:38





             Test Item    Value        Reference Range Interpretation Comments

 

             SARS-CoV-2 (COVID-19) RNA Not detected                           



             [Presence] in Respiratory                                        



             specimen by EDWARD with probe                                        



             detection (test code = 64468-2)                                    

    

 

             Whether patient is employed in a Unknown                           

     



             healthcare setting (test code =                                    

    



             91797-9)                                            

 

             Whether the patient has symptoms Unknown                           

     



             related to condition of interest                                   

     



             (test code = 66869-3)                                        

 

             Whether the patient was Unknown                                



             hospitalized for condition of                                      

  



             interest (test code = 72975-7)                                     

   

 

             Whether the patient was admitted Unknown                           

     



             to intensive care unit (ICU) for                                   

     



             condition of interest (test code                                   

     



             = 95831-6)                                          

 

             Whether patient resides in a Unknown                               

 



             congregate care setting (test                                      

  



             code = 96339-4)                                        

 

             Pregnancy status (test code = Unknown                              

  



             11863-6)                                            

 

             Date and time of symptom onset Unknown                             

   



             (test code = 43884-8)                                        



[facility] ED Preliminary Interpretation - Not an 
[id number] 03:37:43





             Test Item    Value        Reference Range Interpretation Comments

 

             SUGEY (test code = SUGEY) Suresh Pineda MD 2022 3:33                           



                          [facility] Preliminary                           



                          Interpretation - Not                           



                          an OrderPerformed by:                           



                          Suresh Pineda MDAuthorized by:                           



                          Suresh Pineda MD ECG reviewed by ED                           



                          Physician in the                           



                          absence of a                           



                          cardiologist: yes                           



                          Interpretation:                           



                          Interpretation:                           



                          abnormal Quality:                           



                          Tracing quality:                           



                          Limited by                             



                          artifactRate:  ECG                           



                          rate: 94 ECG rate                           



                          assessment: normal                           



                          Rhythm: Rhythm: atrial                           



                          fibrillation Ectopy:                           



                          Ectopy: none QRS: QRS                           



                          axis: Right QRS                           



                          intervals:                             



                          NormalConduction:                           



                          Conduction: normal  ST                           



                          segments: ST segments:                           



                          NormalT waves: T                           



                          waves: non-specific                           



                          Comments: QRS 82. QTc                           



                          420.                                   

 

             Lab Interpretation Abnormal                               



             (test code = 09605-0)                                        



[id number]





             Test Item    Value        Reference    Interpretation Comments



                                       Range                     

 

             PTH (test code = 146 pg/mL    16-77        H             Interpreti

ve Guide Intact



             2731-8)                                             PTH



                                                                 Calcium--------

----------



                                                                 ---------- ----

---Normal



                                                                 Parathyroid Nor

mal



                                                                 NormalHypoparat

hyroidism



                                                                 Low or Low Norm

al



                                                                 LowHyperparathy

roidism



                                                                 Primary Normal 

or High



                                                                 High Secondary 

High Normal



                                                                 or Low Tertiary

 High



                                                                 HighNon-Parathy

roid



                                                                 Hypercalcemia L

ow or Low



                                                                 Normal High

 

             SUGEY (test code = FASTING:NO                             



             SUGEY)         FASTING: NO                            

 

             RAC (test code = Performing                             



             RAC)         Organization                           



                          Information:                           



                          Site ID: [facility]                           



                          [facility]                               



                          Address: [address] [place] [address]                             



                          Director:                              



                          Vignesh Felix                           

 

             Lab          Abnormal                               



             Interpretation                                        



             (test code =                                        



             85856-5)                                            



[facility] [id number] 17:49:00





             Test Item    Value        Reference Range Interpretation Comments

 

             TSH (test                 See_Comment                [Automated mes

zia]



             code =                                              The system ic

h



             3016-3)                                             generated this



                                                                 result transmit

kolby



                                                                 reference range

:



                                                                 0.40 - 4.50 mIU

/L.



                                                                 The reference r

cheyenne



                                                                 was not used to



                                                                 interpret this



                                                                 result as



                                                                 normal/abnormal

.

 

             SUGEY (test    FASTING:NO FASTING:                           



             code = SUGEY)  NO                                     

 

             RAC (test    Performing                             



             code = RAC)  Organization                           



                          Information: Site ID:                           



                          [facility]                           



                          Lab Address: [address] [place] [address] Director:                           



                          Vignesh Felix                           



[facility] 25 hydroxy [id number] 17:49:00





             Test Item    Value        Reference Range Interpretation Comments

 

             Vitamin D,   82 ng/mL                         Vitamin D Statu

s



             25-hydroxy (test                                        25-OH Vitam

in D:



             code = -3)                                        Deficiency: <

20



                                                                 ng/mLInsufficie

ncy



                                                                 : 20 - 29



                                                                 ng/mLOptimal: >

 or



                                                                 = 30 ng/mL For



                                                                 25-OH Vitamin D



                                                                 testing on



                                                                 patients on



                                                                 D2-supplementat

ion



                                                                 and patients fo

r



                                                                 whom quantitati

on



                                                                 of D2 and D3



                                                                 fractions is



                                                                 required, the



                                                                 QuestAssureD(TM

)25



                                                                 -OH VIT D,



                                                                 (D2,D3), LC/MS/

MS



                                                                 is recommended:



                                                                 order code 9288

8



                                                                 (patients



                                                                 >2yrs).See Note

 1



                                                                 Note 1 For



                                                                 additional



                                                                 information,



                                                                 please refer to



                                                                 http://educatio

n.Q



                                                                 [facility]

.co



                                                                 m/faq/OSB716 (T

his



                                                                 link is being



                                                                 provided for



                                                                 informational/e

radu



                                                                 ational purpose

s



                                                                 only.)

 

             SUGEY (test code = FASTING:NO FASTING:                           



             SUGEY)         NO                                     

 

             RAC (test code = Performing                             



             RAC)         Organization                           



                          Information: Site                           



                          ID: [facility]                           



                          Lab Address: [address] [place] [address]                            



                          [id number] Director:                           



                          Vignesh Felix                           



[facility] [id number] 17:49:00





             Test Item    Value        Reference    Interpretation Comments



                                       Range                     

 

             PTH (test code = 146 pg/mL    16-77        H             Interpreti

ve Guide Intact



             2731-8)                                             PTH



                                                                 Calcium--------

----------



                                                                 ---------- ----

---Normal



                                                                 Parathyroid Nor

mal



                                                                 NormalHypoparat

hyroidism



                                                                 Low or Low Norm

al



                                                                 LowHyperparathy

roidism



                                                                 Primary Normal 

or High



                                                                 High Secondary 

High Normal



                                                                 or Low Tertiary

 High



                                                                 HighNon-Parathy

roid



                                                                 Hypercalcemia L

ow or Low



                                                                 Normal High

 

             SUGEY (test code = FASTING:NO                             



             SUGEY)         FASTING: NO                            

 

             RAC (test code = Performing                             



             RAC)         Organization                           



                          Information:                           



                          Site ID: [facility]                           



                          [facility]                               



                          Address: [address] [place] [address]                            



                          [id number]                             



                          Director:                              



                          Vignesh Felix                           

 

             Lab          Abnormal                               



             Interpretation                                        



             (test code =                                        



             86298-0)                                            



[facility] [id number] 17:49:00





             Test Item    Value        Reference Range Interpretation Comments

 

             TSH (test                 See_Comment                [Automated mes

zia]



             code =                                              The system whic

h



             3016-3)                                             generated this



                                                                 result transmit

kolby



                                                                 reference range

:



                                                                 0.40 - 4.50 mIU

/L.



                                                                 The reference r

cheyenne



                                                                 was not used to



                                                                 interpret this



                                                                 result as



                                                                 normal/abnormal

.

 

             SUGEY (test    FASTING:NO FASTING:                           



             code = SUGEY)  NO                                     

 

             RAC (test    Performing                             



             code = RAC)  Organization                           



                          Information: Site ID:                           



                          [facility] Name: [facility]                           



                          Lab Address: [address] [place] [address]                            



                          [id number] Director:                           



                          [facility]St[facility] hydroxy [id number] 17:49:00





             Test Item    Value        Reference Range Interpretation Comments

 

             Vitamin D,   82 ng/mL                         Vitamin D Statu

s



             25-hydroxy (test                                        25-OH Vitam

in D:



             code = 1989-3)                                        Deficiency:  

<20



                                                                 ng/mLInsufficie

ncy



                                                                 : 20 - 29



                                                                 ng/mLOptimal: >

 or



                                                                 = 30 ng/mL For



                                                                 25-OH Vitamin D



                                                                 testing on



                                                                 patients on



                                                                 D2-supplementat

ion



                                                                 and patients fo

r



                                                                 whom quantitati

on



                                                                 of D2 and D3



                                                                 fractions is



                                                                 required, the



                                                                 QuestAssureD(TM

)25



                                                                 -OH VIT D,



                                                                 (D2,D3), LC/MS/

MS



                                                                 is recommended:



                                                                 order code 9288

8



                                                                 (patients



                                                                 >2yrs).See Note

 1



                                                                 Note 1 For



                                                                 additional



                                                                 information,



                                                                 please refer to



                                                                 http://educatio

n.Q



                                                                 [facility]

.co



                                                                 m/faq/[id number] (T

his



                                                                 link is being



                                                                 provided for



                                                                 informational/e

radu



                                                                 ational purpose

s



                                                                 only.)

 

             SUGEY (test code = FASTING:NO FASTING:                           



             SUGEY)         NO                                     

 

             RAC (test code = Performing                             



             RAC)         Organization                           



                          Information: Site                           



                          ID: [facility] Name: [facility]                           



                          Lab Address: [address] [place] [address]                            



                          [id number] Director:                           



                          Vignesh LEVIN                               



                          [place]                           



[facility] [id number] 04:51:00





             Test Item    Value        Reference Range Interpretation Comments

 

             POC glucose (test code = 129 mg/dL    65-99        H            Ope

rator Name:



             [id number])                                            Juliana Rose~Daniela

ce



                                                                 ID:



                                                                 [id number]~Luzmaria

table



                                                                 : HMW Notified 

RN

 

             Lab Interpretation (test Abnormal                               



             code = 34756-7)                                        



[facility] [id number] 04:51:00





             Test Item    Value        Reference Range Interpretation Comments

 

             POC glucose (test code = 129 mg/dL    65-99        H            Ope

rator Name:



             [id number])                                            Juliana Rose~Daniela

ce



                                                                 ID:



                                                                 [id number]~Luzmaria

table



                                                                 : HMW Notified 

RN

 

             Lab Interpretation (test Abnormal                               



             code = 94484-5)                                        



[facility] (w Contrast, Strain and
3D if needed)2022 14:28:07





             Test Item    Value        Reference    Interpretation Comments



                                       Range                     

 

             RA pressure (test              mmHg                      



             code = 9633526372)                                        

 

             EF (test code = 50 %         54-74        A            



             7801409623)                                         

 

             IVS,d (test code = 0.92 cm      0.6-0.9      A            



             5564141497)                                         

 

             IVS s 2D (test code 1.08 cm                                



             = 2162829905)                                        

 

             LVPWD,d (test code = 0.93 cm      0.60-1.19                 



             0001072173)                                         

 

             LVPW s PLAX (test 1.13 cm                                



             code = 1537079018)                                        

 

             LV,s (test code = 2.94 cm                                



             0982427126)                                         

 

             LVOT Diam,S (test 1.98 cm                                



             code = 2397506318)                                        

 

             LV PEREZ VOL (test 66.04 ml                         



             code = 9236434091)                                        

 

             LV SYS VOL (test 33.32 ml     14-42                     



             code = 8353756449)                                        

 

             MV Peak E Adama (test 1.61 m/s                               



             code = 3317825468)                                        

 

             MV Peak A Adama (test 0.58 m/s                               



             code = 6842680594)                                        

 

             E/A ratio (test code              See_Comment  A             [Autom

ated



             = 8821887402)                                        message] The



                                                                 system which



                                                                 generated this



                                                                 result



                                                                 transmitted



                                                                 reference range

:



                                                                 <=0.8. The



                                                                 reference range



                                                                 was not used to



                                                                 interpret this



                                                                 result as



                                                                 normal/abnormal

.

 

             E wave decelartion              See_Comment  A             [Automat

ed



             time (test code =                                        message] T

he



             6110582218)                                         system which



                                                                 generated this



                                                                 result



                                                                 transmitted



                                                                 reference range

:



                                                                 200 msec. The



                                                                 reference range



                                                                 was not used to



                                                                 interpret this



                                                                 result as



                                                                 normal/abnormal

.

 

             LV,d (test code = 3.90 cm                                



             0825015364)                                         

 

             IVS/LVPW,2D (test                                        



             code = 0483331604)                                        

 

             LV EF,2D (test code 57.26 %                                



             = 2536389306)                                        

 

             LV FS Cube 2D (test                                        



             code = 9093918874)                                        

 

             LV FS Teich 2D (test                                        



             code = 7363144637)                                        

 

             LV SV Teich 2D (test 32.73 ml                               



             code = 7760949708)                                        

 

             LV Vol s Teich PSAX 33.32 ml                               



             (test code =                                        



             4063183384)                                         

 

             LVOT stroke volume 0.46 cm3                               



             (test code =                                        



             4586538851)                                         

 

             Left Atrium  5.30 cm      See_Comment  A             [Automated



             Dimension Anterior                                        message] 

The



             (test code =                                        system which



             0016378575)                                         generated this



                                                                 result



                                                                 transmitted



                                                                 reference range

:



                                                                 <=3.8. The



                                                                 reference range



                                                                 was not used to



                                                                 interpret this



                                                                 result as



                                                                 normal/abnormal

.

 

             LA Vol MOD A4C (test 130.09 ml                              



             code = 7641159188)                                        

 

             LA area s A4C (test 36.52 cm2                              



             code = 1913920340)                                        

 

             LVOT area (test code 3.08 cm2                               



             = 6783715132)                                        

 

             LVOT Vmax (test code 0.85 m/s                               



             = 9562766222)                                        

 

             AoV Mean PG (test              See_Comment                [Automate

d



             code = 8450739291)                                        message] 

The



                                                                 system which



                                                                 generated this



                                                                 result



                                                                 transmitted



                                                                 reference range

:



                                                                 20 mmHg. The



                                                                 reference range



                                                                 was not used to



                                                                 interpret this



                                                                 result as



                                                                 normal/abnormal

.

 

             AoV Peak PG (test              mmHg                      



             code = 9496878002)                                        

 

             AV LVOT peak              mmHg                      



             gradient (test code                                        



             = 4047835213)                                        

 

             AoV Area, Vmax (test 1.94 cm2     See_Comment                [Autom

ated



             code = 5014706304)                                        message] 

The



                                                                 system which



                                                                 generated this



                                                                 result



                                                                 transmitted



                                                                 reference range

:



                                                                 >=1.5. The



                                                                 reference range



                                                                 was not used to



                                                                 interpret this



                                                                 result as



                                                                 normal/abnormal

.

 

             LVOT VTI (CM) (test 15.00 cm                               



             code = 4904182812)                                        

 

             AoV Vmax (test code 1.35 m/s                               



             = 8043841573)                                        

 

             AoV Vmn (test code = 0.82 m/s                               



             0339733920)                                         

 

             AoV Area, VTI (test 2.30 cm2                               



             code = 6148514681)                                        

 

             LVOT CO (test code = 4.72 l/min                             



             8976393610)                                         

 

             LVOT HR for LVOT CO              bpm                       



             (test code =                                        



             8440501906)                                         

 

             Velocity Ratio 0.63 m/s                               



             (V1/V2) (test code =                                        



             4689)                                               

 

             MV mean gradient              See_Comment                [Automated



             (test code =                                        message] The



             7028236688)                                         system which



                                                                 generated this



                                                                 result



                                                                 transmitted



                                                                 reference range

:



                                                                 5 mmHg. The



                                                                 reference range



                                                                 was not used to



                                                                 interpret this



                                                                 result as



                                                                 normal/abnormal

.

 

             MR peak grad (test              mmHg                      



             code = 8367874806)                                        

 

             MV stenosis pressure 30.12 ms     See_Comment                [Autom

ated



             1/2 time (test code                                        message]

 The



             = 1371106761)                                        system which



                                                                 generated this



                                                                 result



                                                                 transmitted



                                                                 reference range

:



                                                                 <=150. The



                                                                 reference range



                                                                 was not used to



                                                                 interpret this



                                                                 result as



                                                                 normal/abnormal

.

 

             MV E A ratio (test                                        



             code = 0187134741)                                        

 

             MV valve area p 1/2 7.30 cm2                               



             method (test code =                                        



             2233652042)                                         

 

             MV VTI Tips (test 0.15 m                                 



             code = 9641266420)                                        

 

             MV Vmax (test code = 0.72 m                                 



             8031630258)                                         

 

             RVSP (test code =              See_Comment  A             [Automate

d



             3073189683)                                         message] The



                                                                 system which



                                                                 generated this



                                                                 result



                                                                 transmitted



                                                                 reference range

:



                                                                 40.00 mmHg. The



                                                                 reference range



                                                                 was not used to



                                                                 interpret this



                                                                 result as



                                                                 normal/abnormal

.

 

             TR pk grad (test              mmHg                      



             code = 0865886195)                                        

 

             TR Vpeak (test code 2.85 m/s                               



             = 6895425334)                                        

 

             RVSP (TR) (test code              mmHg                      



             = 6996150924)                                        

 

             RVOT Vmax (test code 0.44 m/s                               



             = 5275718858)                                        

 

             PV Mean Grad (test              mmHg                      



             code = 9234511769)                                        

 

             PV Pk Grad (test              See_Comment                [Automated



             code = 5826171905)                                        message] 

The



                                                                 system which



                                                                 generated this



                                                                 result



                                                                 transmitted



                                                                 reference range

:



                                                                 36 mmHg. The



                                                                 reference range



                                                                 was not used to



                                                                 interpret this



                                                                 result as



                                                                 normal/abnormal

.

 

             PV VTI (test code = 0.17 m                                 



             1428595974)                                         

 

             RVOT pk grad (test              mmHg                      



             code = 9695607933)                                        

 

             PV VMAX (test code = 1.05 m/s     See_Comment                [Autom

ated



             5668064620)                                         message] The



                                                                 system which



                                                                 generated this



                                                                 result



                                                                 transmitted



                                                                 reference range

:



                                                                 <=3. The



                                                                 reference range



                                                                 was not used to



                                                                 interpret this



                                                                 result as



                                                                 normal/abnormal

.

 

             PV Vmn (test code =                                        



             4596472347)                                         

 

             Ao Root Diameter 2.95 cm      See_Comment                [Automated



             (test code =                                        message] The



             3427575023)                                         system which



                                                                 generated this



                                                                 result



                                                                 transmitted



                                                                 reference range

:



                                                                 <=3.99. The



                                                                 reference range



                                                                 was not used to



                                                                 interpret this



                                                                 result as



                                                                 normal/abnormal

.

 

             Ao Root Diameter 2.95 cm                                



             (test code =                                        



             1869653831)                                         

 

             Ascending aorta 2.58 cm                                



             (test code =                                        



             9532335564)                                         

 

             Pred METS R1 (test                                        



             code = 8337571947)                                        

 

             Pred Exer Dur R1                                        



             (test code =                                        



             2172022182)                                         

 

             MV Decel slope (test 15.54 m/s2                             



             code = 3495902110)                                        

 

             LVPW pct thck PLAX 20.67 %                                



             (test code =                                        



             0016621168)                                         

 

             LV vol s cube 2D 25.42 ml                               



             (test code =                                        



             8271570164)                                         

 

             LV vol d cube 2D 59.47 ml                               



             (test code =                                        



             7396491408)                                         

 

             LV SV Cube 2D (test 34.05 ml                               



             code = 8500785778)                                        

 

             IVS pct thck PLAX 17.04 %                                



             (test code =                                        



             6594376951)                                         

 

             Calc MPHR (test code              bpm                       



             = 3166665938)                                        

 

             85 of MPHR (test                                        



             code = 7086787466)                                        

 

             RVOT VTI (test code 0.08 m                                 



             = 9896021478)                                        

 

             RVOT Vmn (test code 0.30 m/s                               



             = [id number])                                        

 

             RVOT mean grad (test              mmHg                      



             code = 1757370408)                                        

 

             MAX Pred HR (test                                        



             code = 0306008218)                                        

 

             LVOT mean grad (test              mmHg                      



             code = 4963649845)                                        

 

             Aov area Vmn (test 2.31 cm2                               



             code = 5890876850)                                        

 

             MV AE ratio (test                                        



             code = 1756607595)                                        

 

             LVOT Vmn (test code                                        



             = 7776900117)                                        

 

             MR Vmax (test code = 4.65 m/s                               



             7860339485)                                         

 

             AoV VTI (test code = 0.20 m                                 



             4372029569)                                         

 

             LVOT VTI (test code 0.15 m                                 



             = 7574155947)                                        

 

                          SUGEY (test code =          

                                                           



             SUGEY)          Left Ventricle:                           



                          Normal systolic                           



                          function with a                           



                          visually estimated                           



                          EF of 55 - 60%.                           



                          Grade III                              



                          (restrictive)                           



                          diastolic                              



                                                    dysfunction.

                                                           



                           Right Ventricle:                           



                          Right ventricle is                           



                          moderately dilated.                           



                          Mildly reduced                           



                                                    systolic function.

                                                           



                           Tricuspid Valve:                           



                          Moderate valvular                           



                          regurgitation. Left                           



                          VentricleLeft                           



                          ventricle size is                           



                          normal. Normal                           



                          systolic function                           



                          with a visually                           



                          estimated EF of 55 -                           



                          60%. Grade III                           



                          (restrictive)                           



                          diastolic                              



                          dysfunction.Right                           



                          VentricleRight                           



                          ventricle is                           



                          moderately dilated.                           



                          Mildly reduced                           



                          systolic                               



                          function.Left                           



                          AtriumLeft atrium is                           



                          severely                               



                          dilated.Right                           



                          AtriumRight atrium                           



                          is severely                            



                          dilated.Mitral                           



                          ValveMild mitral                           



                          annular                                



                          calcification. Mild                           



                          valvular                               



                          regurgitation.Tricus                           



                          pid ValveValve                           



                          structure is normal.                           



                          Moderate valvular                           



                          regurgitation.Aortic                           



                          ValveValve structure                           



                          is normal. No                           



                          significant valvular                           



                          regurgitation.Pulmon                           



                          ic ValveValve                           



                          structure is normal.                           



                          Mild valvular                           



                          regurgitation.Perica                           



                          rdiumThere is no                           



                          pericardial effusion                           



                          present.Study                           



                          DetailsStudy quality                           



                          was adequate. A                           



                          complete 2D, color                           



                          flow Doppler and                           



                          spectral Doppler                           



                          echocardiogram was                           



                          performed.The                           



                          apical, parasternal,                           



                          subcostal and                           



                          suprasternal views                           



                          were obtained.                           



                          Technical                              



                          difficulties due to                           



                          lung artifact.                           

 

             Lab Interpretation Abnormal                               



             (test code =                                        



             88978-6)                                            



[facility], (w Contrast, Strain and
3D if needed)2022 14:28:07





             Test Item    Value        Reference    Interpretation Comments



                                       Range                     

 

             RA pressure (test              mmHg                      



             code = 5234196817)                                        

 

             EF (test code = 50 %         54-74        A            



             4397058845)                                         

 

             IVS,d (test code = 0.92 cm      0.6-0.9      A            



             9031482405)                                         

 

             IVS s 2D (test code 1.08 cm                                



             = 2631183266)                                        

 

             LVPWD,d (test code = 0.93 cm      0.60-1.19                 



             2437444555)                                         

 

             LVPW s PLAX (test 1.13 cm                                



             code = 6682724170)                                        

 

             LV,s (test code = 2.94 cm                                



             0149817900)                                         

 

             LVOT Diam,S (test 1.98 cm                                



             code = 0184252288)                                        

 

             LV PEREZ VOL (test 66.04 ml                         



             code = 9181381166)                                        

 

             LV SYS VOL (test 33.32 ml     14-42                     



             code = 5948176773)                                        

 

             MV Peak E Adama (test 1.61 m/s                               



             code = 1290594824)                                        

 

             MV Peak A Adama (test 0.58 m/s                               



             code = 9518285401)                                        

 

             E/A ratio (test code              See_Comment  A             [Autom

ated



             = 1307704262)                                        message] The



                                                                 system which



                                                                 generated this



                                                                 result



                                                                 transmitted



                                                                 reference range

:



                                                                 <=0.8. The



                                                                 reference range



                                                                 was not used to



                                                                 interpret this



                                                                 result as



                                                                 normal/abnormal

.

 

             E wave decelartion              See_Comment  A             [Automat

ed



             time (test code =                                        message] T

he



             1318673768)                                         system which



                                                                 generated this



                                                                 result



                                                                 transmitted



                                                                 reference range

:



                                                                 200 msec. The



                                                                 reference range



                                                                 was not used to



                                                                 interpret this



                                                                 result as



                                                                 normal/abnormal

.

 

             LV,d (test code = 3.90 cm                                



             5052108284)                                         

 

             IVS/LVPW,2D (test                                        



             code = 9199898366)                                        

 

             LV EF,2D (test code 57.26 %                                



             = 1947846766)                                        

 

             LV FS Cube 2D (test                                        



             code = 4228133268)                                        

 

             LV FS Teich 2D (test                                        



             code = 1082543501)                                        

 

             LV SV Teich 2D (test 32.73 ml                               



             code = 1384972194)                                        

 

             LV Vol s Teich PSAX 33.32 ml                               



             (test code =                                        



             8816102338)                                         

 

             LVOT stroke volume 0.46 cm3                               



             (test code =                                        



             2149764557)                                         

 

             Left Atrium  5.30 cm      See_Comment  A             [Automated



             Dimension Anterior                                        message] 

The



             (test code =                                        system which



             5840042086)                                         generated this



                                                                 result



                                                                 transmitted



                                                                 reference range

:



                                                                 <=3.8. The



                                                                 reference range



                                                                 was not used to



                                                                 interpret this



                                                                 result as



                                                                 normal/abnormal

.

 

             LA Vol MOD A4C (test 130.09 ml                              



             code = 6748119089)                                        

 

             LA area s A4C (test 36.52 cm2                              



             code = 5123928400)                                        

 

             LVOT area (test code 3.08 cm2                               



             = 8136246380)                                        

 

             LVOT Vmax (test code 0.85 m/s                               



             = 7444399233)                                        

 

             AoV Mean PG (test              See_Comment                [Automate

d



             code = 8250506290)                                        message] 

The



                                                                 system which



                                                                 generated this



                                                                 result



                                                                 transmitted



                                                                 reference range

:



                                                                 20 mmHg. The



                                                                 reference range



                                                                 was not used to



                                                                 interpret this



                                                                 result as



                                                                 normal/abnormal

.

 

             AoV Peak PG (test              mmHg                      



             code = 0371809845)                                        

 

             AV LVOT peak              mmHg                      



             gradient (test code                                        



             = 3064179959)                                        

 

             AoV Area, Vmax (test 1.94 cm2     See_Comment                [Autom

ated



             code = 6333344065)                                        message] 

The



                                                                 system which



                                                                 generated this



                                                                 result



                                                                 transmitted



                                                                 reference range

:



                                                                 >=1.5. The



                                                                 reference range



                                                                 was not used to



                                                                 interpret this



                                                                 result as



                                                                 normal/abnormal

.

 

             LVOT VTI (CM) (test 15.00 cm                               



             code = 8317589250)                                        

 

             AoV Vmax (test code 1.35 m/s                               



             = 8012935696)                                        

 

             AoV Vmn (test code = 0.82 m/s                               



             5616808057)                                         

 

             AoV Area, VTI (test 2.30 cm2                               



             code = 8324773652)                                        

 

             LVOT CO (test code = 4.72 l/min                             



             8454774233)                                         

 

             LVOT HR for LVOT CO              bpm                       



             (test code =                                        



             4480422258)                                         

 

             Velocity Ratio 0.63 m/s                               



             (V1/V2) (test code =                                        



             4689)                                               

 

             MV mean gradient              See_Comment                [Automated



             (test code =                                        message] The



             1141562064)                                         system which



                                                                 generated this



                                                                 result



                                                                 transmitted



                                                                 reference range

:



                                                                 5 mmHg. The



                                                                 reference range



                                                                 was not used to



                                                                 interpret this



                                                                 result as



                                                                 normal/abnormal

.

 

             MR peak grad (test              mmHg                      



             code = 7465582621)                                        

 

             MV stenosis pressure 30.12 ms     See_Comment                [Autom

ated



             1/2 time (test code                                        message]

 The



             = 2482981390)                                        system which



                                                                 generated this



                                                                 result



                                                                 transmitted



                                                                 reference range

:



                                                                 <=150. The



                                                                 reference range



                                                                 was not used to



                                                                 interpret this



                                                                 result as



                                                                 normal/abnormal

.

 

             MV E A ratio (test                                        



             code = 0237589518)                                        

 

             MV valve area p 1/2 7.30 cm2                               



             method (test code =                                        



             2283207412)                                         

 

             MV VTI Tips (test 0.15 m                                 



             code = 8317992367)                                        

 

             MV Vmax (test code = 0.72 m                                 



             1802920187)                                         

 

             RVSP (test code =              See_Comment  A             [Automate

d



             6698005907)                                         message] The



                                                                 system which



                                                                 generated this



                                                                 result



                                                                 transmitted



                                                                 reference range

:



                                                                 40.00 mmHg. The



                                                                 reference range



                                                                 was not used to



                                                                 interpret this



                                                                 result as



                                                                 normal/abnormal

.

 

             TR pk grad (test              mmHg                      



             code = 3150962002)                                        

 

             TR Vpeak (test code 2.85 m/s                               



             = 7858387872)                                        

 

             RVSP (TR) (test code              mmHg                      



             = 6166920162)                                        

 

             RVOT Vmax (test code 0.44 m/s                               



             = 0256049502)                                        

 

             PV Mean Grad (test              mmHg                      



             code = 8092542869)                                        

 

             PV Pk Grad (test              See_Comment                [Automated



             code = 8114472089)                                        message] 

The



                                                                 system which



                                                                 generated this



                                                                 result



                                                                 transmitted



                                                                 reference range

:



                                                                 36 mmHg. The



                                                                 reference range



                                                                 was not used to



                                                                 interpret this



                                                                 result as



                                                                 normal/abnormal

.

 

             PV VTI (test code = 0.17 m                                 



             2823553705)                                         

 

             RVOT pk grad (test              mmHg                      



             code = 4672807241)                                        

 

             PV VMAX (test code = 1.05 m/s     See_Comment                [Autom

ated



             0973951363)                                         message] The



                                                                 system which



                                                                 generated this



                                                                 result



                                                                 transmitted



                                                                 reference range

:



                                                                 <=3. The



                                                                 reference range



                                                                 was not used to



                                                                 interpret this



                                                                 result as



                                                                 normal/abnormal

.

 

             PV Vmn (test code =                                        



             4615352631)                                         

 

             Ao Root Diameter 2.95 cm      See_Comment                [Automated



             (test code =                                        message] The



             3829565335)                                         system which



                                                                 generated this



                                                                 result



                                                                 transmitted



                                                                 reference range

:



                                                                 <=3.99. The



                                                                 reference range



                                                                 was not used to



                                                                 interpret this



                                                                 result as



                                                                 normal/abnormal

.

 

             Ao Root Diameter 2.95 cm                                



             (test code =                                        



             0012377309)                                         

 

             Ascending aorta 2.58 cm                                



             (test code =                                        



             0694917672)                                         

 

             Pred METS R1 (test                                        



             code = 3321518845)                                        

 

             Pred Exer Dur R1                                        



             (test code =                                        



             8274388808)                                         

 

             MV Decel slope (test 15.54 m/s2                             



             code = 6219974150)                                        

 

             LVPW pct thck PLAX 20.67 %                                



             (test code =                                        



             7340319298)                                         

 

             LV vol s cube 2D 25.42 ml                               



             (test code =                                        



             8240381221)                                         

 

             LV vol d cube 2D 59.47 ml                               



             (test code =                                        



             6020366220)                                         

 

             LV SV Cube 2D (test 34.05 ml                               



             code = 2859001002)                                        

 

             IVS pct thck PLAX 17.04 %                                



             (test code =                                        



             6363745591)                                         

 

             Calc MPHR (test code              bpm                       



             = 5746651452)                                        

 

             85 of MPHR (test                                        



             code = 4474495717)                                        

 

             RVOT VTI (test code 0.08 m                                 



             = 5491114101)                                        

 

             RVOT Vmn (test code 0.30 m/s                               



             = 4969010355)                                        

 

             RVOT mean grad (test              mmHg                      



             code = 5697667126)                                        

 

             MAX Pred HR (test                                        



             code = 3781713224)                                        

 

             LVOT mean grad (test              mmHg                      



             code = 9479653930)                                        

 

             Aov area Vmn (test 2.31 cm2                               



             code = 2216086541)                                        

 

             MV AE ratio (test                                        



             code = 6579561480)                                        

 

             LVOT Vmn (test code                                        



             = 9866854926)                                        

 

             MR Vmax (test code = 4.65 m/s                               



             0930192749)                                         

 

             AoV VTI (test code = 0.20 m                                 



             8811464676)                                         

 

             LVOT VTI (test code 0.15 m                                 



             = 1963317640)                                        

 

                          SUGEY (test code =          

                                                           



             SUGEY)          Left Ventricle:                           



                          Normal systolic                           



                          function with a                           



                          visually estimated                           



                          EF of 55 - 60%.                           



                          Grade III                              



                          (restrictive)                           



                          diastolic                              



                                                    dysfunction.

                                                           



                           Right Ventricle:                           



                          Right ventricle is                           



                          moderately dilated.                           



                          Mildly reduced                           



                                                    systolic function.

                                                           



                           Tricuspid Valve:                           



                          Moderate valvular                           



                          regurgitation. Left                           



                          VentricleLeft                           



                          ventricle size is                           



                          normal. Normal                           



                          systolic function                           



                          with a visually                           



                          estimated EF of 55 -                           



                          60%. Grade III                           



                          (restrictive)                           



                          diastolic                              



                          dysfunction.Right                           



                          VentricleRight                           



                          ventricle is                           



                          moderately dilated.                           



                          Mildly reduced                           



                          systolic                               



                          function.Left                           



                          AtriumLeft atrium is                           



                          severely                               



                          dilated.Right                           



                          AtriumRight atrium                           



                          is severely                            



                          dilated.Mitral                           



                          ValveMild mitral                           



                          annular                                



                          calcification. Mild                           



                          valvular                               



                          regurgitation.Tricus                           



                          pid ValveValve                           



                          structure is normal.                           



                          Moderate valvular                           



                          regurgitation.Aortic                           



                          ValveValve structure                           



                          is normal. No                           



                          significant valvular                           



                          regurgitation.Pulmon                           



                          ic ValveValve                           



                          structure is normal.                           



                          Mild valvular                           



                          regurgitation.Perica                           



                          rdiumThere is no                           



                          pericardial effusion                           



                          present.Study                           



                          DetailsStudy quality                           



                          was adequate. A                           



                          complete 2D, color                           



                          flow Doppler and                           



                          spectral Doppler                           



                          echocardiogram was                           



                          performed.The                           



                          apical, parasternal,                           



                          subcostal and                           



                          suprasternal views                           



                          were obtained.                           



                          Technical                              



                          difficulties due to                           



                          lung artifact.                           

 

             Lab Interpretation Abnormal                               



             (test code =                                        



             50288-5)                                            



[facility] Respiratory specimen 
by EDWARD with probe [id number] 19:34:22





             Test Item    Value        Reference Range Interpretation Comments

 

             SARS-CoV-2 (COVID-19) RNA [Presence] Detected                      

         



             in Respiratory specimen by EDWARD with                                

        



             probe detection (test code =                                       

 



             41452-8)                                            

 

             Whether patient is employed in a Unknown                           

     



             healthcare setting (test code =                                    

    



             03347-4)                                            

 

             Whether the patient has symptoms Unknown                           

     



             related to condition of interest                                   

     



             (test code = 38986-6)                                        

 

             Whether the patient was hospitalized Unknown                       

         



             for condition of interest (test code                               

         



             = 64679-8)                                          

 

             Whether the patient was admitted to Unknown                        

        



             intensive care unit (ICU) for                                      

  



             condition of interest (test code =                                 

       



             64045-7)                                            

 

             Whether patient resides in a Unknown                               

 



             congregate care setting (test code =                               

         



             73627-7)                                            

 

             Pregnancy status (test code = Unknown                              

  



             15029-0)                                            

 

             Date and time of symptom onset (test Unknown                       

         



             code = 98232-3)                                        



[facility] METABOLIC [id number] 16:39:00





             Test Item    Value        Reference Range Interpretation Comments

 

             SODIUM (test code = NA) 138 mEq/L    134-147      N            

 

             POTASSIUM (test code = 3.7 mEq/L    3.4-5.0      N            



             K)                                                  

 

             CHLORIDE (test code = 100 mEq/L    100-108      N            



             CL)                                                 

 

             CARBON DIOXIDE (test 28 mEq/l     21-33        N            



             code = CO2)                                         

 

             ANION GAP (test code = 13           0-20         N            



             GAP)                                                

 

             GLUCOSE (test code = 77 mg/dL            N            



             GLU)                                                

 

             BLOOD UREA NITROGEN 13 mg/dL     7-18         N            



             (test code = BUN)                                        

 

             GLOMERULAR FILTRATION 11.6         80-90        L            Units 

of measure =



             RATE (test code = GFR)                                        ml/mi

n/1.73 m2

 

             CREATININE (test code = 3.9 mg/dL    0.6-1.3      H            



             CREAT)                                              

 

             CALCIUM (test code = 8.8 mg/dL    8.0-10.5     N            



             CA)                                                 



PROTHROMBIN [id number] 16:33:00





             Test Item    Value        Reference Range Interpretation Comments

 

             PROTHROMBIN TIME 13.1 SECONDS 9.3-12.9     H            



             PATIENT (test code =                                        



             PTP)                                                

 

             INTERNATIONAL NORMAL 1.2          0.8-1.2      N             TARGET

 INR BY



             RATIO (test code =                                        INDICATIO

N Indication



             INR)                                                INR1. Prophylax

is of



                                                                 venous thrombos

is 2.0



                                                                 - 3.0 (orthoped

ic



                                                                 surgery), Proph

ylaxis



                                                                 of venous throm

bosis



                                                                 (other than hig

h-risk



                                                                 surgery), Treat

ment of



                                                                 Deep Vein



                                                                 Thrombosis/Pulm

onary



                                                                 Embolism, Preve

ntion



                                                                 of systemic emb

olism -



                                                                 Tissue heart va

lves,



                                                                 Acute Myocardia

l



                                                                 Infarction (to 

prevent



                                                                 systemic emboli

sm),



                                                                 Valvular heart



                                                                 disease, Atrial



                                                                 Fibrillation,



                                                                 Bileaflet mecha

nical



                                                                 valve in aortic



                                                                 position.2. Mec

hanical



                                                                 prosthetic valv

es



                                                                 (high risk), 2.

5 - 3.5



                                                                 Presence of Lup

us



                                                                 Anticoagulant o

r



                                                                 Antiphospholipi

d



                                                                 Antibodies, Pre

vention



                                                                 of systemic emb

olism -



                                                                 Acute Myocardia

l



                                                                 Infarction (to 

prevent



                                                                 recurrent infar

ct).



CBC W/AUTO [id number] 16:23:00





             Test Item    Value        Reference Range Interpretation Comments

 

             WHITE BLOOD CELL (test code = 9.2 x10 3/uL 4.5-11.0     N          

  



             WBC)                                                

 

             RED BLOOD CELL (test code = 3.17 x10 6/uL 3.54-5.02    L           

 



             RBC)                                                

 

             HEMOGLOBIN (test code = HGB) 10.3 g/dL    11.0-15.0    L           

 

 

             HEMATOCRIT (test code = HCT) 33.2 %       33.0-45.0    N           

 

 

             MEAN CELL VOLUME (test code = 104.7 fL     81.0-99.0    H          

  



             MCV)                                                

 

             MEAN CELL HGB (test code = MCH) 32.5 pg      27.0-33.0    N        

    

 

             MEAN CELL HGB CONCETRATION 31.0 g/dL    33.0-37.0    L            



             (test code = MCHC)                                        

 

             RED CELL DISTRIBUTION WIDTH CV 14.5 %       11.5-14.5    N         

   



             (test code = RDW)                                        

 

             RED CELL DISTRIBUTION WIDTH SD 56.6 fL      37.0-54.0    H         

   



             (test code = RDW-SD)                                        

 

             PLATELET COUNT (test code = 108 x10 3/uL 150-400      L            



             PLT)                                                

 

             MEAN PLATELET VOLUME (test code 10.2 fL      7.0-9.0      H        

    



             = MPV)                                              

 

             NEUTROPHIL % (test code = NT%) 80.2 %       56.0-77.0    H         

   

 

             IMMATURE GRANULOCYTE % (test 1.4 %        0.0-2.0      N           

 



             code = IG%)                                         

 

             LYMPHOCYTE % (test code = LY%) 11.0 %       14.0-32.0    L         

   

 

             MONOCYTE % (test code = MO%) 5.2 %        4.8-9.0      N           

 

 

             EOSINOPHIL % (test code = EO%) 1.5 %        0.3-3.7      N         

   

 

             BASOPHIL % (test code = BA%) 0.7 %        0.0-2.0      N           

 

 

             NUCLEATED RBC % (test code = 0.0 %        0-0          N           

 



             NRBC%)                                              

 

             NEUTROPHIL # (test code = NT#) 7.34 x10 3/uL 2.0-7.6      N        

    

 

             IMMATURE GRANULOCYTE # (test 0.13 x10 3/uL 0.00-0.03    H          

  



             code = IG#)                                         

 

             LYMPHOCYTE # (test code = LY#) 1.01 x10 3/uL 1.0-3.8      N        

    

 

             MONOCYTE # (test code = MO#) 0.48 x10 3/uL 0.1-0.8      N          

  

 

             EOSINOPHIL # (test code = EO#) 0.14 x10 3/uL 0.0-0.2      N        

    

 

             BASOPHIL # (test code = BA#) 0.06 x10 3/uL 0.0-0.2      N          

  

 

             NUCLEATED RBC # (test code = 0.00 x10 3/uL 0.0-0.1      N          

  



             NRBC#)                                              

 

             MANUAL DIFF REQUIRED (test code NO                                 

    



             = MDIFF)                                            



Hepatitis B surface [id number] 21:36:32





             Test Item    Value        Reference Range Interpretation Comments

 

             Hep B S Ab (test code <8.0         See_Comment                [Auto

mated



             = 78808-8)                                          message] The



                                                                 system which



                                                                 generated this



                                                                 result transmit

[place]



                                                                 reference range

:



                                                                 <8.0 mIU/mL. Th

e



                                                                 reference range



                                                                 was not used to



                                                                 interpret this



                                                                 result as



                                                                 normal/abnormal

.

 

             SUGEY (test code = SUGEY)  ID -                           



                          DB                                     

 

             Lab Interpretation Normal                                 



             (test code = 09299-1)                                        



[id number] 21[facility]36[facility]32





             Test Item    Value        Reference Range Interpretation Comments

 

             Hep B S Ab (test code <8.0         See_Comment                [Auto

mated



             = 05110-1)                                          message] The



                                                                 system which



                                                                 generated this



                                                                 result transmit

[place]



                                                                 reference range

:



                                                                 <8.0 mIU/mL. Th

e



                                                                 reference range



                                                                 was not used to



                                                                 interpret this



                                                                 result as



                                                                 normal/abnormal

.

 

             SUGEY (test code = SUGEY)  ID -                           



                          DB                                     

 

             Lab Interpretation Normal                                 



             (test code = 76075-3)                                        



[facility] B surface [id number] 21:36:32





             Test Item    Value        Reference Range Interpretation Comments

 

             Hep B S Ab (test code <8.0         See_Comment                [Auto

mated



             = 24146-7)                                          message] The



                                                                 system which



                                                                 generated this



                                                                 result transmit

[place]ol[place]



                                                                 reference range

:



                                                                 <8.0 mIU/mL. Th

e



                                                                 reference range



                                                                 was not used to



                                                                 interpret this



                                                                 result as



                                                                 normal/abnormal

.

 

             SUGEY (test code = SUGEY)  ID -                           



                          DB                                     

 

             Lab Interpretation Normal                                 



             (test code = 86880-0)                                        



[facility] B surface [id number] 21:36:32





             Test Item    Value        Reference Range Interpretation Comments

 

             Hep B S Ab (test code <8.0         See_Comment                [Auto

mated



             = 84828-6)                                          message] The



                                                                 system which



                                                                 generated this



                                                                 result transmit

[place]



                                                                 reference range

:



                                                                 <8.0 mIU/mL. Th

e



                                                                 reference range



                                                                 was not used to



                                                                 interpret this



                                                                 result as



                                                                 normal/abnormal

.

 

             SUGEY (test code = SUGEY)  ID -                           



                          DB                                     

 

             Lab Interpretation Normal                                 



             (test code = 09317-7)                                        



[facility] B surface [id number] 21:36:32





             Test Item    Value        Reference Range Interpretation Comments

 

             Hep B S Ab (test code <8.0         See_Comment                [Auto

mated



             = 76577-8)                                          message] The



                                                                 system which



                                                                 generated this



                                                                 result transmit

[place]



                                                                 reference range

:



                                                                 <8.0 mIU/mL. Th

e



                                                                 reference range



                                                                 was not used to



                                                                 interpret this



                                                                 result as



                                                                 normal/abnormal

.

 

             SUGEY (test code = SUGEY)  ID -                           



                          DB                                     

 

             Lab Interpretation Normal                                 



             (test code = 30268-7)                                        



[facility] B surface [id number] 21:36:32





             Test Item    Value        Reference Range Interpretation Comments

 

             Hep B S Ab (test code <8.0         See_Comment                [Auto

mated



             = 48531-6)                                          message] The



                                                                 system which



                                                                 generated this



                                                                 result transmit

[place]



                                                                 reference range

:



                                                                 <8.0 mIU/mL. Th

e



                                                                 reference range



                                                                 was not used to



                                                                 interpret this



                                                                 result as



                                                                 normal/abnormal

.

 

             SUGEY (test code = SUGEY)  ID -                           



                          DB                                     

 

             Lab Interpretation Normal                                 



             (test code = 90857-2)                                        



[id number]





             Test Item    Value        Reference Range Interpretation Comments

 

             HEPATITIS B SURFACE ANTIBODY < mIU/mL     <8.0                     

 



             (BEAKER) (test code = 647)                                        



 ID - DBHepatitis B surface [id number] 21:24:22





             Test Item    Value        Reference Range Interpretation Comments

 

             Hepatitis B surface Nonreactive  Nonreactive               



             antigen (test code =                                        



             5195-3)                                             

 

             SUGEY (test code = SUGEY) Specimen is                            



                          considered negative                           



                          for HBsAg.                             

 

             Lab Interpretation (test Normal                                 



             code = 58188-6)                                        



[facility] [id number] 21:24:22





             Test Item    Value        Reference Range Interpretation Comments

 

             Hepatitis B surface Nonreactive  Nonreactive               



             antigen (test code =                                        



             5195-3)                                             

 

             SUGEY (test code = SUGEY) Specimen is                            



                          considered negative                           



                          for HBsAg.                             

 

             Lab Interpretation (test Normal                                 



             code = 01399-6)                                        



[facility] [id number] 21:24:22





             Test Item    Value        Reference Range Interpretation Comments

 

             HEPATITIS B SURFACE ANTIGEN (2) Nonreactive  Nonreactive           

    



             (BEAKER) (test code = 2585)                                        



Specimen is considered negative for HBsAg.Transthoracic Echocardiogram Complete,
(w Contrast, Strain and 3D if needed)2022 13:23:07





             Test Item    Value        Reference    Interpretation Comments



                                       Range                     

 

             EF (test code = 66 %         54-74                     



             [id number])                                         

 

             IVS,d (test code = 1.03 cm      0.6-0.9      A            



             7318543043)                                         

 

             LVPWD,d (test code = 1.27 cm      0.60-1.19    A            



             3605364018)                                         

 

             LV,s (test code = 2.56 cm                                



             1362455688)                                         

 

             LVOT Diam,S (test 2.01 cm                                



             code = 7444318195)                                        

 

             LV PEREZ VOL (test 69.20 ml                         



             code = 2048020299)                                        

 

             LV SYS VOL (test 23.74 ml     14-42                     



             code = 7076203331)                                        

 

             MV Peak E Adama (test 1.20 m/s                               



             code = 1520295465)                                        

 

             MV Peak A Adama (test 0.47 m/s                               



             code = 0226786175)                                        

 

             E/A ratio (test code              See_Comment  A             [Autom

ated



             = 6207978924)                                        message] The



                                                                 system which



                                                                 generated this



                                                                 result



                                                                 transmitted



                                                                 reference range

:



                                                                 <=0.8. The



                                                                 reference range



                                                                 was not used to



                                                                 interpret this



                                                                 result as



                                                                 normal/abnormal

.

 

             E wave decelartion              See_Comment  A             [Automat

ed



             time (test code =                                        message] T

he



             7863799949)                                         system which



                                                                 generated this



                                                                 result



                                                                 transmitted



                                                                 reference range

:



                                                                 200 msec. The



                                                                 reference range



                                                                 was not used to



                                                                 interpret this



                                                                 result as



                                                                 normal/abnormal

.

 

             LV,d (test code = 3.98 cm                                



             9234944961)                                         

 

             IVS/LVPW,2D (test                                        



             code = 2547498632)                                        

 

             LV EF,2D (test code 73.32 %                                



             = 3936345940)                                        

 

             LV FS Cube 2D (test                                        



             code = 4044581579)                                        

 

             LV FS Teich 2D (test                                        



             code = 5074392494)                                        

 

             LV SV Teich 2D (test 45.46 ml                               



             code = 8554152612)                                        

 

             LV Vol s Teich PSAX 23.74 ml                               



             (test code =                                        



             4498062002)                                         

 

             LVOT stroke volume 0.35 cm3                               



             (test code =                                        



             0026303545)                                         

 

             Left Atrium  4.52 cm      See_Comment  A             [Automated



             Dimension Anterior                                        message] 

The



             (test code =                                        system which



             2562147924)                                         generated this



                                                                 result



                                                                 transmitted



                                                                 reference range

:



                                                                 <=3.8. The



                                                                 reference range



                                                                 was not used to



                                                                 interpret this



                                                                 result as



                                                                 normal/abnormal

.

 

             LA Vol MOD A4C (test 83.62 ml                               



             code = 5186189239)                                        

 

             LA area s A4C (test 28.59 cm2                              



             code = 6767496778)                                        

 

             LA Ao Ratio Mmode                                        



             (test code =                                        



             3640768708)                                         

 

             LVOT area (test code 3.17 cm2                               



             = 1180973594)                                        

 

             LVOT Vmax (test code 0.62 m/s                               



             = 6371982764)                                        

 

             AoV Mean PG (test              See_Comment                [Automate

d



             code = 0744645300)                                        message] 

The



                                                                 system which



                                                                 generated this



                                                                 result



                                                                 transmitted



                                                                 reference range

:



                                                                 20 mmHg. The



                                                                 reference range



                                                                 was not used to



                                                                 interpret this



                                                                 result as



                                                                 normal/abnormal

.

 

             AoV Peak PG (test              mmHg                      



             code = 8786512557)                                        

 

             AV LVOT peak              mmHg                      



             gradient (test code                                        



             = 6393880058)                                        

 

             AoV Area, Vmax (test 2.00 cm2     See_Comment                [Autom

ated



             code = 3959288175)                                        message] 

The



                                                                 system which



                                                                 generated this



                                                                 result



                                                                 transmitted



                                                                 reference range

:



                                                                 >=1.5. The



                                                                 reference range



                                                                 was not used to



                                                                 interpret this



                                                                 result as



                                                                 normal/abnormal

.

 

             LVOT VTI (CM) (test 11.00 cm                               



             code = 0974391170)                                        

 

             AoV Vmax (test code 0.98 m/s                               



             = 8374514746)                                        

 

             AoV Vmn (test code = 0.65 m/s                               



             7305435368)                                         

 

             AoV Area, VTI (test 2.42 cm2                               



             code = 5067874780)                                        

 

             Velocity Ratio 0.63 m/s                               



             (V1/V2) (test code =                                        



             4689)                                               

 

             MR peak grad (test              mmHg                      



             code = 4845440279)                                        

 

             MV stenosis pressure 31.00 ms     See_Comment                [Autom

ated



             1/2 time (test code                                        message]

 The



             = 1766143978)                                        system which



                                                                 generated this



                                                                 result



                                                                 transmitted



                                                                 reference range

:



                                                                 <=150. The



                                                                 reference range



                                                                 was not used to



                                                                 interpret this



                                                                 result as



                                                                 normal/abnormal

.

 

             MV E A ratio (test                                        



             code = 1850802451)                                        

 

             MV valve area p 1/2 7.10 cm2                               



             method (test code =                                        



             8883041727)                                         

 

             E prime lat (test                                        



             code = 6445959374)                                        

 

             E prine sept (test                                        



             code = 4920794078)                                        

 

             RVSP (test code =              See_Comment  A             [Automate

d



             3635737126)                                         message] The



                                                                 system which



                                                                 generated this



                                                                 result



                                                                 transmitted



                                                                 reference range

:



                                                                 40.00 mmHg. The



                                                                 reference range



                                                                 was not used to



                                                                 interpret this



                                                                 result as



                                                                 normal/abnormal

.

 

             RA pressure (test              mmHg                      



             code = 3555049917)                                        

 

             TR pk grad (test              mmHg                      



             code = 7090553525)                                        

 

             TR Vpeak (test code 3.51 m/s                               



             = 0310418310)                                        

 

             RVSP (TR) (test code              mmHg                      



             = 6473845824)                                        

 

             RVOT Vmax (test code 0.46 m/s                               



             = 4753911479)                                        

 

             PV Pk Grad (test              See_Comment                [Automated



             code = 5306664267)                                        message] 

The



                                                                 system which



                                                                 generated this



                                                                 result



                                                                 transmitted



                                                                 reference range

:



                                                                 36 mmHg. The



                                                                 reference range



                                                                 was not used to



                                                                 interpret this



                                                                 result as



                                                                 normal/abnormal

.

 

             RVOT pk grad (test              mmHg                      



             code = 9129652374)                                        

 

             PV VMAX (test code = 1.15 m/s     See_Comment                [Autom

ated



             5378755743)                                         message] The



                                                                 system which



                                                                 generated this



                                                                 result



                                                                 transmitted



                                                                 reference range

:



                                                                 <=3. The



                                                                 reference range



                                                                 was not used to



                                                                 interpret this



                                                                 result as



                                                                 normal/abnormal

.

 

             Ao root annulus 2.82 cm                                



             (test code =                                        



             [id number])                                         

 

             Ao Root Diameter 3.23 cm      See_Comment                [Automated



             (test code =                                        message] The



             [id number])                                         system which



                                                                 generated this



                                                                 result



                                                                 transmitted



                                                                 reference range

:



                                                                 <=3.99. The



                                                                 reference range



                                                                 was not used to



                                                                 interpret this



                                                                 result as



                                                                 normal/abnormal

.

 

             Ao Root Diameter 3.23 cm                                



             (test code =                                        



             8205959876)                                         

 

             Ascending aorta 3.69 cm                                



             (test code =                                        



             9915223854)                                         

 

             Pred METS R1 (test                                        



             code = 9287979128)                                        

 

             Pred Exer Dur R1                                        



             (test code =                                        



             4967593823)                                         

 

             MV Decel slope (test 11.26 m/s2                             



             code = 0911376690)                                        

 

             LV vol s cube 2D 16.83 ml                               



             (test code =                                        



             4426262975)                                         

 

             LV vol d cube 2D 63.08 ml                               



             (test code =                                        



             6317156703)                                         

 

             LV SV Cube 2D (test 46.25 ml                               



             code = 8650992802)                                        

 

             Calc MPHR (test code              bpm                       



             = 4960962655)                                        

 

             85 of MPHR (test                                        



             code = 6465503457)                                        

 

             MAX Pred HR (test                                        



             code = 9119991636)                                        

 

             LVOT mean grad (test              mmHg                      



             code = 3289870975)                                        

 

             Aov area Vmn (test 1.92 cm2                               



             code = 3180353534)                                        

 

             MV AE ratio (test                                        



             code = 1524909614)                                        

 

             LVOT Vmn (test code                                        



             = 0577391096)                                        

 

             MR Vmax (test code = 4.22 m/s                               



             4115528484)                                         

 

             AoV VTI (test code = 0.15 m                                 



             8158801396)                                         

 

             LVOT VTI (test code 0.11 m                                 



             = 5206011583)                                        

 

                          SUGEY (test code =          

                                                           



             SUGEY)          Left Ventricle:                           



                          Normal systolic                           



                          function with a                           



                          visually estimated                           



                          EF of 65 - 70%.                           



                          Grade I (impaired                           



                          relaxation)                            



                          diastolic                              



                                                    dysfunction.

                                                           



                           Left Atrium: Left                           



                          atrium is mildly                           



                                                    dilated.

                                                           



                           Right Ventricle:                           



                          Right ventricle is                           



                          moderately dilated.                           



                          Reduced systolic                           



                                                    function.

                                                           



                           Right Atrium: Right                           



                          atrium is moderately                           



                                                    dilated.

                                                           



                           Mitral Valve: Valve                           



                          structure is normal.                           



                          Mildly calcified                           



                          leaflets. Mild                           



                          valvular                               



                                                    regurgitation.

                                                           



                           Tricuspid Valve:                           



                          Moderate valvular                           



                          regurgitation.                           



                          Moderately elevated                           



                          pulmonary artery                           



                          systolic pressure.                           



                          RVSP is 59.82 mmHg.                           



                          Left VentricleLeft                           



                          ventricle size is                           



                          normal. Normal                           



                          systolic function                           



                          with a visually                           



                          estimated EF of 65 -                           



                          70%. Grade I                           



                          (impaired                              



                          relaxation)                            



                          diastolic                              



                          dysfunction.Right                           



                          VentricleRight                           



                          ventricle is                           



                          moderately dilated.                           



                          Reduced systolic                           



                          function.Left                           



                          AtriumLeft atrium is                           



                          mildly dilated.Right                           



                          AtriumRight atrium                           



                          is moderately                           



                          dilated.Mitral                           



                          ValveValve structure                           



                          is normal. Mildly                           



                          calcified leaflets.                           



                          Mild valvular                           



                          regurgitation.Tricus                           



                          pid ValveValve                           



                          structure is normal.                           



                          Moderate valvular                           



                          regurgitation.                           



                          Moderately elevated                           



                          pulmonary artery                           



                          systolic pressure.                           



                          RVSP is 59.82                           



                          mmHg.Aortic                            



                          ValveValve structure                           



                          appears tricuspid.                           



                          No significant                           



                          valvular                               



                          regurgitation. No                           



                          stenosis.Pulmonic                           



                          ValveValve structure                           



                          is                                     



                          normal.PericardiumTh                           



                          ere is no                              



                          pericardial effusion                           



                          present.Study                           



                          DetailsStudy quality                           



                          was good. A complete                           



                          2D, color flow                           



                          Doppler and spectral                           



                          Doppler                                



                          echocardiogram was                           



                          performed.The                           



                          apical, parasternal,                           



                          subcostal and                           



                          suprasternal views                           



                          were obtained.                           



                          Patient exhibited                           



                          sinus tachycardia.                           

 

             Lab Interpretation Abnormal                               



             (test code =                                        



             25088-8)                                            



[facility], (w Contrast, Strain and
3D if needed)2022 13:23:07





             Test Item    Value        Reference    Interpretation Comments



                                       Range                     

 

             EF (test code = 66 %         54-74                     



             [id number])                                         

 

             IVS,d (test code = 1.03 cm      0.6-0.9      A            



             6089146246)                                         

 

             LVPWD,d (test code = 1.27 cm      0.60-1.19    A            



             4782920015)                                         

 

             LV,s (test code = 2.56 cm                                



             8554798059)                                         

 

             LVOT Diam,S (test 2.01 cm                                



             code = 4993434792)                                        

 

             LV PEREZ VOL (test 69.20 ml                         



             code = 0610446351)                                        

 

             LV SYS VOL (test 23.74 ml     14-42                     



             code = 2203104539)                                        

 

             MV Peak E Adama (test 1.20 m/s                               



             code = 7146612121)                                        

 

             MV Peak A Adama (test 0.47 m/s                               



             code = 9471038075)                                        

 

             E/A ratio (test code              See_Comment  A             [Autom

ated



             = 1725496110)                                        message] The



                                                                 system which



                                                                 generated this



                                                                 result



                                                                 transmitted



                                                                 reference range

:



                                                                 <=0.8. The



                                                                 reference range



                                                                 was not used to



                                                                 interpret this



                                                                 result as



                                                                 normal/abnormal

.

 

             E wave decelartion              See_Comment  A             [Automat

ed



             time (test code =                                        message] T

he



             3978581561)                                         system which



                                                                 generated this



                                                                 result



                                                                 transmitted



                                                                 reference range

:



                                                                 200 msec. The



                                                                 reference range



                                                                 was not used to



                                                                 interpret this



                                                                 result as



                                                                 normal/abnormal

.

 

             LV,d (test code = 3.98 cm                                



             7467424897)                                         

 

             IVS/LVPW,2D (test                                        



             code = 5417161851)                                        

 

             LV EF,2D (test code 73.32 %                                



             = 3606217702)                                        

 

             LV FS Cube 2D (test                                        



             code = 9370578254)                                        

 

             LV FS Teich 2D (test                                        



             code = 0217904219)                                        

 

             LV SV Teich 2D (test 45.46 ml                               



             code = 7457440130)                                        

 

             LV Vol s Teich PSAX 23.74 ml                               



             (test code =                                        



             4939634264)                                         

 

             LVOT stroke volume 0.35 cm3                               



             (test code =                                        



             6452970552)                                         

 

             Left Atrium  4.52 cm      See_Comment  A             [Automated



             Dimension Anterior                                        message] 

The



             (test code =                                        system which



             3548707948)                                         generated this



                                                                 result



                                                                 transmitted



                                                                 reference range

:



                                                                 <=3.8. The



                                                                 reference range



                                                                 was not used to



                                                                 interpret this



                                                                 result as



                                                                 normal/abnormal

.

 

             LA Vol MOD A4C (test 83.62 ml                               



             code = 6332796575)                                        

 

             LA area s A4C (test 28.59 cm2                              



             code = 3888134758)                                        

 

             LA Ao Ratio Mmode                                        



             (test code =                                        



             2949830782)                                         

 

             LVOT area (test code 3.17 cm2                               



             = 8711373767)                                        

 

             LVOT Vmax (test code 0.62 m/s                               



             = 6754978837)                                        

 

             AoV Mean PG (test              See_Comment                [Automate

d



             code = 1647569360)                                        message] 

The



                                                                 system which



                                                                 generated this



                                                                 result



                                                                 transmitted



                                                                 reference range

:



                                                                 20 mmHg. The



                                                                 reference range



                                                                 was not used to



                                                                 interpret this



                                                                 result as



                                                                 normal/abnormal

.

 

             AoV Peak PG (test              mmHg                      



             code = 8286865431)                                        

 

             AV LVOT peak              mmHg                      



             gradient (test code                                        



             = 2947233910)                                        

 

             AoV Area, Vmax (test 2.00 cm2     See_Comment                [Autom

ated



             code = 1819984536)                                        message] 

The



                                                                 system which



                                                                 generated this



                                                                 result



                                                                 transmitted



                                                                 reference range

:



                                                                 >=1.5. The



                                                                 reference range



                                                                 was not used to



                                                                 interpret this



                                                                 result as



                                                                 normal/abnormal

.

 

             LVOT VTI (CM) (test 11.00 cm                               



             code = 4368453112)                                        

 

             AoV Vmax (test code 0.98 m/s                               



             = 5608213168)                                        

 

             AoV Vmn (test code = 0.65 m/s                               



             7785215538)                                         

 

             AoV Area, VTI (test 2.42 cm2                               



             code = 3277313354)                                        

 

             Velocity Ratio 0.63 m/s                               



             (V1/V2) (test code =                                        



             4689)                                               

 

             MR peak grad (test              mmHg                      



             code = 6137995091)                                        

 

             MV stenosis pressure 31.00 ms     See_Comment                [Autom

ated



             1/2 time (test code                                        message]

 The



             = 0690870191)                                        system which



                                                                 generated this



                                                                 result



                                                                 transmitted



                                                                 reference range

:



                                                                 <=150. The



                                                                 reference range



                                                                 was not used to



                                                                 interpret this



                                                                 result as



                                                                 normal/abnormal

.

 

             MV E A ratio (test                                        



             code = 7256698428)                                        

 

             MV valve area p 1/2 7.10 cm2                               



             method (test code =                                        



             9349624079)                                         

 

             E prime lat (test                                        



             code = 6397479789)                                        

 

             E prine sept (test                                        



             code = 3048001910)                                        

 

             RVSP (test code =              See_Comment  A             [Automate

d



             [id number])                                         message] The



                                                                 system which



                                                                 generated this



                                                                 result



                                                                 transmitted



                                                                 reference range

:



                                                                 40.00 mmHg. The



                                                                 reference range



                                                                 was not used to



                                                                 interpret this



                                                                 result as



                                                                 normal/abnormal

.

 

             RA pressure (test              mmHg                      



             code = 6378020613)                                        

 

             TR pk grad (test              mmHg                      



             code = 4820628836)                                        

 

             TR Vpeak (test code 3.51 m/s                               



             = 0856122505)                                        

 

             RVSP (TR) (test code              mmHg                      



             = 4403769881)                                        

 

             RVOT Vmax (test code 0.46 m/s                               



             = 7783827962)                                        

 

             PV Pk Grad (test              See_Comment                [Automated



             code = 9352848913)                                        message] 

The



                                                                 system which



                                                                 generated this



                                                                 result



                                                                 transmitted



                                                                 reference range

:



                                                                 36 mmHg. The



                                                                 reference range



                                                                 was not used to



                                                                 interpret this



                                                                 result as



                                                                 normal/abnormal

.

 

             RVOT pk grad (test              mmHg                      



             code = 3421927614)                                        

 

             PV VMAX (test code = 1.15 m/s     See_Comment                [Autom

ated



             7562248213)                                         message] The



                                                                 system which



                                                                 generated this



                                                                 result



                                                                 transmitted



                                                                 reference range

:



                                                                 <=3. The



                                                                 reference range



                                                                 was not used to



                                                                 interpret this



                                                                 result as



                                                                 normal/abnormal

.

 

             Ao root annulus 2.82 cm                                



             (test code =                                        



             3000525827)                                         

 

             Ao Root Diameter 3.23 cm      See_Comment                [Automated



             (test code =                                        message] The



             6717133045)                                         system which



                                                                 generated this



                                                                 result



                                                                 transmitted



                                                                 reference range

:



                                                                 <=3.99. The



                                                                 reference range



                                                                 was not used to



                                                                 interpret this



                                                                 result as



                                                                 normal/abnormal

.

 

             Ao Root Diameter 3.23 cm                                



             (test code =                                        



             7155524099)                                         

 

             Ascending aorta 3.69 cm                                



             (test code =                                        



             1386870617)                                         

 

             Pred METS R1 (test                                        



             code = 2038916341)                                        

 

             Pred Exer Dur R1                                        



             (test code =                                        



             1979054102)                                         

 

             MV Decel slope (test 11.26 m/s2                             



             code = 7620722639)                                        

 

             LV vol s cube 2D 16.83 ml                               



             (test code =                                        



             2155486869)                                         

 

             LV vol d cube 2D 63.08 ml                               



             (test code =                                        



             9450820984)                                         

 

             LV SV Cube 2D (test 46.25 ml                               



             code = 0899690578)                                        

 

             Calc MPHR (test code              bpm                       



             = 7517168521)                                        

 

             85 of MPHR (test                                        



             code = 6800973148)                                        

 

             MAX Pred HR (test                                        



             code = 2516585401)                                        

 

             LVOT mean grad (test              mmHg                      



             code = 5476817972)                                        

 

             Aov area Vmn (test 1.92 cm2                               



             code = 2219217326)                                        

 

             MV AE ratio (test                                        



             code = 2591044142)                                        

 

             LVOT Vmn (test code                                        



             = 5428505534)                                        

 

             MR Vmax (test code = 4.22 m/s                               



             0645486121)                                         

 

             AoV VTI (test code = 0.15 m                                 



             1730811529)                                         

 

             LVOT VTI (test code 0.11 m                                 



             = 4302227297)                                        

 

                          SUGEY (test code =          

                                                           



             SUGEY)          Left Ventricle:                           



                          Normal systolic                           



                          function with a                           



                          visually estimated                           



                          EF of 65 - 70%.                           



                          Grade I (impaired                           



                          relaxation)                            



                          diastolic                              



                                                    dysfunction.

                                                           



                           Left Atrium: Left                           



                          atrium is mildly                           



                                                    dilated.

                                                           



                           Right Ventricle:                           



                          Right ventricle is                           



                          moderately dilated.                           



                          Reduced systolic                           



                                                    function.

                                                           



                           Right Atrium: Right                           



                          atrium is moderately                           



                                                    dilated.

                                                           



                           Mitral Valve: Valve                           



                          structure is normal.                           



                          Mildly calcified                           



                          leaflets. Mild                           



                          valvular                               



                                                    regurgitation.

                                                           



                           Tricuspid Valve:                           



                          Moderate valvular                           



                          regurgitation.                           



                          Moderately elevated                           



                          pulmonary artery                           



                          systolic pressure.                           



                          RVSP is 59.82 mmHg.                           



                          Left VentricleLeft                           



                          ventricle size is                           



                          normal. Normal                           



                          systolic function                           



                          with a visually                           



                          estimated EF of 65 -                           



                          70%. Grade I                           



                          (impaired                              



                          relaxation)                            



                          diastolic                              



                          dysfunction.Right                           



                          VentricleRight                           



                          ventricle is                           



                          moderately dilated.                           



                          Reduced systolic                           



                          function.Left                           



                          AtriumLeft atrium is                           



                          mildly dilated.Right                           



                          AtriumRight atrium                           



                          is moderately                           



                          dilated.Mitral                           



                          ValveValve structure                           



                          is normal. Mildly                           



                          calcified leaflets.                           



                          Mild valvular                           



                          regurgitation.Tricus                           



                          pid ValveValve                           



                          structure is normal.                           



                          Moderate valvular                           



                          regurgitation.                           



                          Moderately elevated                           



                          pulmonary artery                           



                          systolic pressure.                           



                          RVSP is 59.82                           



                          mmHg.Aortic                            



                          ValveValve structure                           



                          appears tricuspid.                           



                          No significant                           



                          valvular                               



                          regurgitation. No                           



                          stenosis.Pulmonic                           



                          ValveValve structure                           



                          is                                     



                          normal.PericardiumTh                           



                          ere is no                              



                          pericardial effusion                           



                          present.Study                           



                          DetailsStudy quality                           



                          was good. A complete                           



                          2D, color flow                           



                          Doppler and spectral                           



                          Doppler                                



                          echocardiogram was                           



                          performed.The                           



                          apical, parasternal,                           



                          subcostal and                           



                          suprasternal views                           



                          were obtained.                           



                          Patient exhibited                           



                          sinus tachycardia.                           

 

             Lab Interpretation Abnormal                               



             (test code =                                        



             13537-3)                                            



[facility] [id number] 07:26:00





             Test Item    Value        Reference    Interpretation Comments



                                       Range                     

 

             Urine culture Proteus [id number]              A            Specime

n



             isolate (test cfu/mlThe                              [facility]



             code = 14897-4) performance                            Source: Urin

eSpecimen



                          characteristics of                           Site: [facility]

an catch



                          this assay on this                           



                          isolatewere                            



                          validated by the                           



                          Microbiology                           



                          Laboratory at                           



                          [facility]. This                           



                          source has not been                           



                          approved by the                           



                          U.S. Food and Drug                           



                          Administration. The                           



                          results are not                           



                          intended to be used                           



                          as the sole means                           



                          for clinical                           



                          diagnosis or                           



                          patient management.                           



                          The Microbiology                           



                          Laboratory is                           



                          authorized under                           



                          the clinical                           



                          Laboratory                             



                          Improvement                            



                          Amendments of 1988                           



                          (CLIA-88) to                           



                          perform high                           



                          complexity testing.                           

 

             Lab          Abnormal                               



             Interpretation                                        



             (test code =                                        



             72306-4)                                            



[facility] [id number] 07:26:00





             Test Item    Value        Reference    Interpretation Comments



                                       Range                     

 

             Urine culture Proteus [id number]              A            Specime

n



             isolate (test cfu/mlThe                              [facility]



             code = 50991-8) performance                            Source: Urin

eSpecimen



                          characteristics of                           Site: [facility]

an catch



                          this assay on this                           



                          isolatewere                            



                          validated by the                           



                          Microbiology                           



                          Laboratory at                           



                          [facility]. This                           



                          source has not been                           



                          approved by the                           



                          U.S. Food and Drug                           



                          Administration. The                           



                          results are not                           



                          intended to be used                           



                          as the sole means                           



                          for clinical                           



                          diagnosis or                           



                          patient management.                           



                          The Microbiology                           



                          Laboratory is                           



                          authorized under                           



                          the clinical                           



                          Laboratory                             



                          Improvement                            



                          Amendments of 1988                           



                          (CLIA-88) to                           



                          perform high                           



                          complexity testing.                           

 

             Lab          Abnormal                               



             Interpretation                                        



             (test code =                                        



             56704-8)                                            



[facility]Pres[facility] Respiratory specimen 
by EDWARD with probe [id number] 00:41:02





             Test Item    Value        Reference Range Interpretation Comments

 

             SARS-CoV-2 (COVID-19) RNA Not detected                           



             [Presence] in Respiratory                                        



             specimen by EDWARD with probe                                        



             detection (test code = 13023-8)                                    

    

 

             Whether patient is employed in a Unknown                           

     



             healthcare setting (test code =                                    

    



             82359-9)                                            

 

             Whether the patient has symptoms Unknown                           

     



             related to condition of interest                                   

     



             (test code = 43037-7)                                        

 

             Whether the patient was Unknown                                



             hospitalized for condition of                                      

  



             interest (test code = 37354-7)                                     

   

 

             Whether the patient was admitted Unknown                           

     



             to intensive care unit (ICU) for                                   

     



             condition of interest (test code                                   

     



             = 09702-1)                                          

 

             Whether patient resides in a Unknown                               

 



             congregate care setting (test                                      

  



             code = 52626-4)                                        

 

             Pregnancy status (test code = Unknown                              

  



             11271-3)                                            

 

             Date and time of symptom onset Unknown                             

   



             (test code = 24315-1)                                        



[facility]- XR [id number] 10:28:00
[facility] : 1955 Sex: 
F************************************************************ Name: 
SUZI SEARS [facility]  : 1955 Age/S: 66 / F [id number] [facility] 
Unit #: [id number] Loc: [place] [address] Phys: Suzie Dominguez MD  Acct: 
[id number] Dis Date: Status: REG SDC PHONE #: [phone] Exam Date: 
2022 0950  FAX #: Reason: ERCP EXAMS: CPT: [id number] XR ERCP  56394 Fluoro
 Time: 33 SEC DAP (Gy m2): Air Kerma (mGy): EXAMINATION: - XR ERCP. LOCATION: 
S17. HISTORY: ERCP, CBD obstruction. COMPARISON: None. FINDINGS/ IMPRESSION: 
Nine portable limited intraoperative fluoroscopic images of right upper quadrant
 during ERCP are submitted to radiology department. Initial imagedemonstrates 
CBD stent and postoperative clips in upper abdomen. Subsequent images 
demonstrate contrast opacification of CBD and small bowel. Please see operative 
report for further details. No radiologist was present during procedure. 
FLUOROSCOPIC TIME: 35.6 seconds. Reference air Kerma: 11.146 mGy. ** 
Electronically Signed by ISH Paz ** ** on 2022 at 1028 **
  Reported and signed by: Roverto Paz M.D. CC: Suzie Dominguez MD; 
Charisse Coyne MD  PAGE 1 Signed Report Name: SUZI SEARS 
: 1955 Age/S: 66 / F [id number] [facility] Unit #: [id number] Loc: 
[place] [address] Phys: Suzie Dominguez MD  Acct: [id number] Dis Date: Status: 
REG SDC PHONE #: [phone] Exam Date: 2022 0950 FAX #:  Reason: ERCP 
EXAMS: CPT: [id number] XR ERCP [id number] Fluoro Time: 33 SEC DAP (Gy m2): Air Kerma 
(mGy): (Continued) Technologist: Maribel Diaz, RT(R) Trnscb Date/Time: 
2022 () [id number] Orig Print D/T: S: 2022 (3952) PAGE 2 Signed 
[id number] 08:51:00





             Test Item    Value        Reference Range Interpretation Comments

 

             POTASSIUM (test code = K) 5.1 mmol/L   3.4-5.0      H            



CBC W/AUTO [id number] 08:15:00





             Test Item    Value        Reference Range Interpretation Comments

 

             WHITE BLOOD CELL (test code = 9.2 K/mm3    3.5-11.0     N          

  



             WBC)                                                

 

             RED BLOOD CELL (test code = 3.76 M/mm3   4.70-6.10    L            



             RBC)                                                

 

             HEMOGLOBIN (test code = HGB) 11.8 G/DL    10.4-14.9    N           

 

 

             HEMATOCRIT (test code = HCT) 37.7 %       31.5-44.1    N           

 

 

             MEAN CELL VOLUME (test code = 100.3 Fl     84.5-98.6    H          

  



             MCV)                                                

 

             MEAN CELL HGB (test code = MCH) 31.4 pg      27.0-34.2    N        

    

 

             MEAN CELL HGB CONCETRATION 31.3 G/DL    31.5-34.0    L            



             (test code = MCHC)                                        

 

             RED CELL DISTRIBUTION WIDTH 13.2 SD      11.5-14.5    N            



             (test code = RDW)                                        

 

             PLATELET COUNT (test code = 136 K/mm3    150-450      L            



             PLT)                                                

 

             MEAN PLATELET VOLUME (test code 9.80 fL      7.0-10.5     N        

    



             = MPV)                                              

 

             NEUTROPHIL % (test code = NT%) 71.9 %       40-76        N         

   

 

             IMMATURE GRANULOCYTE % (test 0.9 %        0.0-5.0      N           

 



             code = IG%)                                         

 

             LYMPHOCYTE % (test code = LY%) 18.1 %       20.5-51.1    L         

   

 

             MONOCYTE % (test code = MO%) 6.8 %        1.7-9.3      N           

 

 

             EOSINOPHIL % (test code = EO%) 1.5 %        0.0-6.0      N         

   

 

             BASOPHIL % (test code = BA%) 0.8 %        0.0-2.0      N           

 

 

             NUCLEATED RBC % (test code = 0.0 /100WBC% 0.0-1.0      N           

 



             NRBC%)                                              

 

             NEUTROPHIL # (test code = NT#) 6.7 K/mm3    1.8-7.6      N         

   

 

             IMMATURE GRANULOCYTE # (test 0.08 x10 3/uL 0.00-0.03    H          

  



             code = IG#)                                         

 

             LYMPHOCYTE # (test code = LY#) 1.7 K/mm3    0.6-3.2      N         

   

 

             MONOCYTE # (test code = MO#) 0.6 K/mm3    0.3-1.1      N           

 

 

             EOSINOPHIL # (test code = EO#) 0.1 K/mm3    0.0-0.4      N         

   

 

             BASOPHIL # (test code = BA#) 0.1 K/mm3    0.0-0.1      N           

 

 

             NUCLEATED RBC # (test code = 0.0 K/mm3    0.0-0.1      N           

 



             NRBC#)                                              

 

             MANUAL DIFF REQUIRED (test code NO DIFF/SCN  CRITERIA              

    



             = MDIFF)                                            



BASIC METABOLIC [id number] 08:08:00





             Test Item    Value        Reference Range Interpretation Comments

 

             SODIUM (test code = NA) 135 mmol/L   134-147      N            

 

             POTASSIUM (test code = K) 5.9 mmol/L   3.4-5.0      HH           

 

             CHLORIDE (test code = CL) 103 mmol/L   100-108      N            

 

             CARBON DIOXIDE (test code = CO2) 27 mmol/L    21-32        N       

     

 

             ANION GAP (test code = GAP) 5.0 GAP calc 4.0-15.0     N            

 

             GLUCOSE (test code = GLU) 109 MG/DL           N            

 

             BLOOD UREA NITROGEN (test code = 43 MG/DL     7-18         H       

     



             BUN)                                                

 

             GLOMERULAR FILTRATION RATE (test 4 estGFR     >60          L       

     



             code = GFR)                                         

 

             CREATININE (test code = CREAT) 10.5 MG/DL   0.6-1.0      H         

   

 

             CALCIUM (test code = CA) 9.9 MG/DL    8.5-10.1     N            



COVID 19 INHOUSE [id number] 14:11:00





             Test Item    Value        Reference Range Interpretation Comments

 

             COVID 19 INHOUSE AG NEGATIVE     Negative                  Per manu

facturer,



             (test code =                                        negative result

s should



             [id number])                                        be treated aspr

esumptive



                                                                 and, if inconsi

stent with



                                                                 clinical signs



                                                                 andsymptoms or 

necessary



                                                                 for patient man

agement,



                                                                 should betested

 with an



                                                                 alternative mol

ecular



                                                                 assay. Negative

 resultsdo



                                                                 not preclude SA

RS-CoV-2



                                                                 infection and s

hould not



                                                                 be usedas the s

ole basis



                                                                 for patient man

agement



                                                                 decisions. Nega

tive



                                                                 results should 

be



                                                                 considered in t

he context



                                                                 of apatient's r

ecent



                                                                 exposures, hist

ory,



                                                                 presence of cli

nicalsigns



                                                                 and symptoms co

nsistent



                                                                 with COVID-19.



CBC W/AUTO [id number] 14:00:00





             Test Item    Value        Reference Range Interpretation Comments

 

             WHITE BLOOD CELL 7.2 K/mm3    3.5-11.0     N            



             (test code = WBC)                                        

 

             RED BLOOD CELL (test 4.01 M/mm3   4.70-6.10    L            



             code = RBC)                                         

 

             HEMOGLOBIN (test code 12.6 G/DL    10.4-14.9    N            



             = HGB)                                              

 

             HEMATOCRIT (test code 39.7 %       31.5-44.1    N            



             = HCT)                                              

 

             MEAN CELL VOLUME 99.0 Fl      84.5-98.6    H            



             (test code = MCV)                                        

 

             MEAN CELL HGB (test 31.4 pg      27.0-34.2    N            



             code = MCH)                                         

 

             MEAN CELL HGB 31.7 G/DL    31.5-34.0    N            



             CONCETRATION (test                                        



             code = MCHC)                                        

 

             RED CELL DISTRIBUTION 13.3 SD      11.5-14.5    N            



             WIDTH (test code =                                        



             RDW)                                                

 

             PLATELET COUNT (test 106 K/mm3    150-450      L            



             code = PLT)                                         

 

             MEAN PLATELET VOLUME 10.20 fL     7.0-10.5     N            



             (test code = MPV)                                        

 

             NEUTROPHIL % (test 72.0 %       40-76        N            



             code = NT%)                                         

 

             IMMATURE GRANULOCYTE 0.7 %        0.0-5.0      N            



             % (test code = IG%)                                        

 

             LYMPHOCYTE % (test 17.8 %       20.5-51.1    L            



             code = LY%)                                         

 

             MONOCYTE % (test code 7.4 %        1.7-9.3      N            



             = MO%)                                              

 

             EOSINOPHIL % (test 1.4 %        0.0-6.0      N            



             code = EO%)                                         

 

             BASOPHIL % (test code 0.7 %        0.0-2.0      N            



             = BA%)                                              

 

             NUCLEATED RBC % (test 0.0 /100WBC% 0.0-1.0      N            



             code = NRBC%)                                        

 

             NEUTROPHIL # (test 5.2 K/mm3    1.8-7.6      N            



             code = NT#)                                         

 

             IMMATURE GRANULOCYTE 0.05 x10 3/uL 0.00-0.03    H            



             # (test code = IG#)                                        

 

             LYMPHOCYTE # (test 1.3 K/mm3    0.6-3.2      N            



             code = LY#)                                         

 

             MONOCYTE # (test code 0.5 K/mm3    0.3-1.1      N            



             = MO#)                                              

 

             EOSINOPHIL # (test 0.1 K/mm3    0.0-0.4      N            



             code = EO#)                                         

 

             BASOPHIL # (test code 0.1 K/mm3    0.0-0.1      N            



             = BA#)                                              

 

             NUCLEATED RBC # (test 0.0 K/mm3    0.0-0.1      N            



             code = NRBC#)                                        

 

             MANUAL DIFF REQUIRED NO DIFF/SCN  CRITERIA                  SLIDE R

SERGIOW



             (test code = MDIFF)                                        CONSISTA

NT WITH



                                                                 AUTO DIFFERENTI

AL.



BASIC METABOLIC [id number] 13:32:00





             Test Item    Value        Reference Range Interpretation Comments

 

             SODIUM (test code = NA) 136 mmol/L   134-147      N            

 

             POTASSIUM (test code = K) 5.1 mmol/L   3.4-5.0      H            

 

             CHLORIDE (test code = CL) 103 mmol/L   100-108      N            

 

             CARBON DIOXIDE (test code = CO2) 26 mmol/L    21-32        N       

     

 

             ANION GAP (test code = GAP) 7.0 GAP calc 4.0-15.0     N            

 

             GLUCOSE (test code = GLU) 103 MG/DL           N            

 

             BLOOD UREA NITROGEN (test code = 37 MG/DL     7-18         H       

     



             BUN)                                                

 

             GLOMERULAR FILTRATION RATE (test 6 estGFR     >60          L       

     



             code = GFR)                                         

 

             CREATININE (test code = CREAT) 7.4 MG/DL    0.6-1.0      H         

   

 

             CALCIUM (test code = CA) 10.1 MG/DL   8.5-10.1     N            



HEPATIC FUNCTION [id number] 13:32:00





             Test Item    Value        Reference Range Interpretation Comments

 

             TOTAL PROTEIN (test code = PROT) 7.7 G/DL     6.4-8.2      N       

     

 

             ALBUMIN (test code = ALB) 3.4 G/DL     3.4-5.0      N            

 

             BILIRUBIN TOTAL (test code = BILT) 2.10 MG/DL   0.2-1.2      H     

       

 

             BILIRUBIN DIRECT (test code = 0.50 MG/DL   0.00-0.30    H          

  



             BILD)                                               

 

             BILIRUBIN INDIRECT (test code = 1.60 MG/DL   0.2-1.2      H        

    



             BILIND)                                             

 

             SGOT/AST (test code = AST) 17 Unit/L    15-37        N            

 

             SGPT/ALT (test code = ALT) 27 Unit/L    12-78        N            

 

             ALKALINE PHOSPHATASE TOTAL (test 222 Unit/L          H       

     



             code = ALKP)                                        



[id number] 13:32:00





             Test Item    Value        Reference Range Interpretation Comments

 

             LIPASE (test code = LIP) 109 Unit/L   114-286      L            



[id number] 18:01:00





             Test Item    Value        Reference Range Interpretation Comments

 

             SURGICAL (test --------------------------------                    

       



             code = SR)   --------------------------------                      

     



                          ----------------------------RUN                       

    



                          DATE: 22 [facility] PAGE 1 RUN TIME:                       

    



                          1801 Specimen Inquiry RUN USER:                       

    



                          INTERFACE                              



                          --------------------------------                      

     



                          --------------------------------                      

     



                          ----------------------------KYLE                      

     



                          ENT: SUZI SEARS  ACCT #:                         

  



                          [id number] LOC: L.END U #:                          

 



                          [id number] AGE/SX: 66/F ROOM:                         

  



                          RE22REG DR:                           



                          Clark Cuellar MD :                           



                          57 BED: DIS: STATUS: DEP                        

   



                          SDC TLOC:                              



                          --------------------------------                      

     



                          --------------------------------                      

     



                          ----------------------------                          

 



                          SPEC #: 22:PMC: RECD:                           



                           STATUS: VERONICA LEYVA                        

   



                          #: [id number] SOFIA:                        

    



                          [facility] DR: Clark Cuellar MD                          

 



                          ENTERED:  SP TYPE:                       

    



                          SURGICAL OTHR DR:                           



                          Charisse Coyne MD ORDERED:                         

  



                          23551/2, 39194, 36794, 90811,                         

  



                          ANATOMIC SPEC, SPECIMEN TRACK                         

  



                          COPIES TO: Charisse Coyne MD                       

    



                          [address] [place] [address] [phone]                           



                          [place] [address]                         

  



                          [phone] PROCEDURES: 24244                        

   



                          () 29563                           



                          () 29393                           



                          () 69601                           



                          () SPECIMEN TRACK                        

   



                          () TISSUES: A.                           



                          GASTRIC MUCOSA - BX ANTRUM AND                        

   



                          BODY B. ESOPHAGUS BIOPSY -                           



                          DISTAI ESOPHAGUS BX FINAL                           



                          DIAGNOSIS A. Stomach, antrum and                      

     



                          body, endoscopic biopsy:-                           



                          Healing/reparative/chemical                           



                          gastropathy changes, minimal to                       

    



                          mild- Negative for intestinal                         

  



                          metaplasia- Negative for                           



                          Helicobacter pylori                           



                          (Immunohistochemistry stain) B.                       

    



                          Esophagus, distal, endoscopic                         

  



                          biopsy:- Reflux esophagitis,                          

 



                          mild- Negative for glandular                          

 



                          epithelium/intestinal                           



                          metaplasia/dysplasia (Alcian                          

 



                          Blue stain)- Negative for fungal                      

     



                          organisms (PAS stain) Comment:                        

   



                          Suggest clinical correlation.                         

  



                          Note: For each marker stain                           



                          tested above: Positive control                        

   



                          is positive and                           



                          Negative(external or internal)                        

   



                          control is negative (staining                         

  



                          performed at [facility]).                       

    



                          ** CONTINUED ON NEXT PAGE **                          

 



                          --------------------------------                      

     



                          --------------------------------                      

     



                          ----------------------------RUN                       

    



                          DATE: 22 [facility] PAGE 2 RUN TIME:                       

    



                          1801 Specimen Inquiry RUN USER:                       

    



                          INTERFACE                              



                          --------------------------------                      

     



                          --------------------------------                      

     



                          ----------------------------SPEC                      

     



                          #: 22:PMC: PATIENT:                           



                          SUZI SEARS #[id number]                         

  



                          (Continued)---------------------                      

     



                          --------------------------------                      

     



                          --------------------------------                      

     



                          ------- GROSS DESCRIPTION A.                          

 



                          Antrum and body biopsy. It                           



                          consists of 4 tissue fragments                        

   



                          measuring 2-5 mm. All as A1. B.                       

    



                          Distal esophageal biopsy. It                          

 



                          consists of 2 tissue fragments                        

   



                          measuring 3 mm each. Allas B1.                        

   



                          Technical component performed at                      

     



                          [facility],[id number] ,                        

   



                          [place] [address] Unless gross                         

  



                          only, the diagnosis is based                          

 



                          upon microscopic                           



                          examination.Immunohistochemistry                      

     



                          : This test was developed and                         

  



                          its performancecharacteristics                        

   



                          determined by this laboratory.                        

   



                          It has not been approved nordoes                      

     



                          it need approval by the [place] FDA.                       

    



                          Appropriate positive and                           



                          negative controlsare reviewed                         

  



                          and judged to be acceptable.                          

 



                          This laboratory is certified                          

 



                          underthe Clinical Laboratory                          

 



                          Improvement Amendments (CLIA-88)                      

     



                          as qualified toperform high                           



                          complexity clinical laboratory                        

   



                          testing. MICROSCOPIC DESCRIPTION                      

     



                          Findings are incorporated into                        

   



                          the diagnosis                           



                          section.------------------------                      

     



                          --------------------------------                      

     



                          --------------------------------                      

     



                          ---- Signed SIGNATURE ON Kye Zambrano 22 1801                         

  



                          --------------------------------                      

     



                          --------------------------------                      

     



                          ----------------------------  **                      

     



                          END OF REPORT **                           



BASIC METABOLIC [id number] 08:13:00





             Test Item    Value        Reference Range Interpretation Comments

 

             SODIUM (test code = NA) 137 mmol/L   134-147      N            

 

             POTASSIUM (test code = K) 4.2 mmol/L   3.4-5.0      N            

 

             CHLORIDE (test code = CL) 105 mmol/L   100-108      N            

 

             CARBON DIOXIDE (test code = CO2) 23 mmol/L    21-32        N       

     

 

             ANION GAP (test code = GAP) 9.0 GAP calc 4.0-15.0     N            

 

             GLUCOSE (test code = GLU) 111 MG/DL           H            

 

             BLOOD UREA NITROGEN (test code = 41 MG/DL     7-18         H       

     



             BUN)                                                

 

             GLOMERULAR FILTRATION RATE (test 5 estGFR     >60          L       

     



             code = GFR)                                         

 

             CREATININE (test code = CREAT) 8.6 MG/DL    0.6-1.0      H         

   

 

             CALCIUM (test code = CA) 9.8 MG/DL    8.5-10.1     N            



- XR CHEST 2 -02-39 13:28:00
[facility] 1955 Sex: 
F************************************************************ Name: 
SUZI SEARS[place] : 1955 Age/S: 66 / F [id number] [facility] 
Unit #: [id number] Loc: [place] [address] Phys: Clark Cuellar MD Acct: 
[id number] Dis Date: Status: PRE [facility] [place]ONE #: [phone] Exam Date: 
2022 1253 FAX #: Reason: PRE OP EXAMS: CPT: [id number] XR CHEST 2 V 07808 
Fluoro Time: DAP (Gy m2): Air Kerma (mGy): Chest 2 views 2022 1:27 PM  
CLINICAL HISTORY: Preop COMPARISON: None available LOCATION: W1 IMPRESSION: 
There is elevation of the right hemidiaphragm. Bibasilar opacities suggest 
atelectasis. Pneumonia should be excluded clinically. Cardiomediastinal contours
 are within normal limits. The central vasculature is not engorged. A tunneled 
left hemodialysis catheter is present. There are chronic appearing degenerative 
changes in the skeleton. ** Electronically Signed by M.D. Timothy Seipel ** ** 
on 2022 at 1328 ** Reported and signed by: Timothy Seipel, M.D.  CC: 
Charisse Coyne MD; Clark Cuellar MD PAGE 1 Signed Report  Name: 
SUZI SEARS [place] : 1955 Age/S: 66 / F [id number] [facility] 
Unit #: [id number] Loc: [place], Xl18734 Phys: Clark Cuellar MD Acct: 
[id number] Dis Date: Status: PRE [facility] [place]  PHONE #: [phone] Exam Date: 
2022 1253 FAX #: Reason: PRE OP EXAMS:  CPT: [id number] XR CHEST 2 V 74441 
Fluoro Time: DAP (Gy m2): Air Kerma (mGy): (Continued) Technologist: Kylah Jacobo, RT (R)(CT) Trnscb Date/Time: 2022 (7162) FedeR.[email or web address] Orig Print D/T: 
S: 2022 (4574)  PAGE 2 Signed ReportBASIC METABOLIC [id number] 
12:55:00





             Test Item    Value        Reference Range Interpretation Comments

 

             SODIUM (test code = NA) 134 mmol/L   134-147      N            

 

             POTASSIUM (test code = K) 4.6 mmol/L   3.4-5.0      N            

 

             CHLORIDE (test code = CL) 100 mmol/L   100-108      N            

 

             CARBON DIOXIDE (test code = CO2) 28 mmol/L    21-32        N       

     

 

             ANION GAP (test code = GAP) 6.0 GAP calc 4.0-15.0     N            

 

             GLUCOSE (test code = GLU) 113 MG/DL           H            

 

             BLOOD UREA NITROGEN (test code = 38 MG/DL     7-18         H       

     



             BUN)                                                

 

             GLOMERULAR FILTRATION RATE (test 6 estGFR     >60          L       

     



             code = GFR)                                         

 

             CREATININE (test code = CREAT) 7.0 MG/DL    0.6-1.0      H         

   

 

             CALCIUM (test code = CA) 10.5 MG/DL   8.5-10.1     H            



COVID 19 INHOUSE [id number] 12:52:00





             Test Item    Value        Reference Range Interpretation Comments

 

             COVID 19 INHOUSE AG NEGATIVE     Negative                  Per arthur

facturer,



             (test code =                                        negative result

s should



             [id number])                                        be treated aspr

esumptive



                                                                 and, if inconsi

stent with



                                                                 clinical signs



                                                                 andsymptoms or 

necessary



                                                                 for patient man

agement,



                                                                 should betested

 with an



                                                                 alternative mol

ecular



                                                                 assay. Negative

 resultsdo



                                                                 not preclude SA

RS-CoV-2



                                                                 infection and s

hould not



                                                                 be usedas the s

ole basis



                                                                 for patient man

agement



                                                                 decisions. Nega

tive



                                                                 results should 

be



                                                                 considered in t

he context



                                                                 of apatient's r

ecent



                                                                 exposures, hist

ory,



                                                                 presence of cli

nicalsigns



                                                                 and symptoms co

nsistent



                                                                 with COVID-19.



PROTHROMBIN [id number] 12:44:00





             Test Item    Value        Reference Range Interpretation Comments

 

             PT PATIENT (test 12.2 SECONDS 9.3-12.9     N            



             code = PTP)                                         

 

             INTERNATIONAL NORMAL 1.07 INR Unit 0.8-1.2      N             TARGE

T INR BY



             RATIO (test code =                                        INDICATIO

N Indication



             INR)                                                INR1. Prophylax

is of



                                                                 venous thrombos

is 2.0



                                                                 - 3.0 (orthoped

ic



                                                                 surgery), Proph

ylaxis



                                                                 of venous throm

bosis



                                                                 (other than hig

h-risk



                                                                 surgery), Treat

ment of



                                                                 Deep Vein



                                                                 Thrombosis/Pulm

onary



                                                                 Embolism, Preve

ntion



                                                                 of systemic emb

olism -



                                                                 Tissue heart va

lves,



                                                                 Acute Myocardia

l



                                                                 Infarction (to 

prevent



                                                                 systemic emboli

sm),



                                                                 Valvular heart



                                                                 disease, Acute



                                                                 Myocardial Infa

rction



                                                                 (to prevent sys

temic



                                                                 embolism), Valv

ular



                                                                 heart disease, 

Atrial



                                                                 Fibrillation,



                                                                 Bileaflet mecha

nical



                                                                 valve in aortic



                                                                 position.2. Mec

hanical



                                                                 prosthetic valv

es



                                                                 (high risk), 2.

5 - 3.5



                                                                 Presence of Lup

us



                                                                 Anticoagulant o

r



                                                                 Antiphospholipi

d



                                                                 Antibodies, Pre

vention



                                                                 of systemic emb

olism -



                                                                 Acute Myocardia

l



                                                                 Infarction (to 

prevent



                                                                 recurrent infar

ct).



THROMBOPLASTIN TIME [id number] 12:44:00





             Test Item    Value        Reference Range Interpretation Comments

 

             THROMBOPLASTIN TIME PARTIAL 36.2 SECONDS 26-35        H            



             (test code = PTT)                                        



CBC W/AUTO [id number] 12:43:00





             Test Item    Value        Reference Range Interpretation Comments

 

             WHITE BLOOD CELL (test code = 9.1 K/mm3    3.5-11.0     N          

  



             WBC)                                                

 

             RED BLOOD CELL (test code = 4.16 M/mm3   4.70-6.10    L            



             RBC)                                                

 

             HEMOGLOBIN (test code = HGB) 13.1 G/DL    10.4-14.9    N           

 

 

             HEMATOCRIT (test code = HCT) 41.8 %       31.5-44.1    N           

 

 

             MEAN CELL VOLUME (test code = 100.5 Fl     84.5-98.6    H          

  



             MCV)                                                

 

             MEAN CELL HGB (test code = MCH) 31.5 pg      27.0-34.2    N        

    

 

             MEAN CELL HGB CONCETRATION 31.3 G/DL    31.5-34.0    L            



             (test code = MCHC)                                        

 

             RED CELL DISTRIBUTION WIDTH 13.8 SD      11.5-14.5    N            



             (test code = RDW)                                        

 

             PLATELET COUNT (test code = 140 K/mm3    150-450      L            



             PLT)                                                

 

             MEAN PLATELET VOLUME (test code 10.20 fL     7.0-10.5     N        

    



             = MPV)                                              

 

             NEUTROPHIL % (test code = NT%) 71.8 %       40-76        N         

   

 

             IMMATURE GRANULOCYTE % (test 1.3 %        0.0-5.0      N           

 



             code = IG%)                                         

 

             LYMPHOCYTE % (test code = LY%) 18.2 %       20.5-51.1    L         

   

 

             MONOCYTE % (test code = MO%) 6.7 %        1.7-9.3      N           

 

 

             EOSINOPHIL % (test code = EO%) 1.3 %        0.0-6.0      N         

   

 

             BASOPHIL % (test code = BA%) 0.7 %        0.0-2.0      N           

 

 

             NUCLEATED RBC % (test code = 0.0 /100WBC% 0.0-1.0      N           

 



             NRBC%)                                              

 

             NEUTROPHIL # (test code = NT#) 6.5 K/mm3    1.8-7.6      N         

   

 

             IMMATURE GRANULOCYTE # (test 0.12 x10 3/uL 0.00-0.03    H          

  



             code = IG#)                                         

 

             LYMPHOCYTE # (test code = LY#) 1.7 K/mm3    0.6-3.2      N         

   

 

             MONOCYTE # (test code = MO#) 0.6 K/mm3    0.3-1.1      N           

 

 

             EOSINOPHIL # (test code = EO#) 0.1 K/mm3    0.0-0.4      N         

   

 

             BASOPHIL # (test code = BA#) 0.1 K/mm3    0.0-0.1      N           

 

 

             NUCLEATED RBC # (test code = 0.0 K/mm3    0.0-0.1      N           

 



             NRBC#)                                              

 

             MANUAL DIFF REQUIRED (test code NO DIFF/SCN  CRITERIA              

    



             = MDIFF)                                            



SARS-CoV-2 (COVID-19) RNA [Presence] in Respiratory specimen by EDWARD with probe 
[id number] 23:10:10





             Test Item    Value        Reference Range Interpretation Comments

 

             SARS coronavirus RNA [Presence] Not detected                       

    



             in Isolate by EDWARD with probe                                       

 



             detection (test code = 78518-6)                                    

    

 

             Whether patient is employed in a No                                

     



             healthcare setting (test code =                                    

    



             47216-4)                                            

 

             Whether the patient has symptoms Yes                               

     



             related to condition of interest                                   

     



             (test code = 45355-6)                                        

 

             Whether the patient was No                                     



             hospitalized for condition of                                      

  



             interest (test code = 25017-1)                                     

   

 

             Whether the patient was admitted No                                

     



             to intensive care unit (ICU) for                                   

     



             condition of interest (test code                                   

     



             = 58116-4)                                          

 

             Whether patient resides in a No                                    

 



             congregate care setting (test                                      

  



             code = 34392-4)                                        

 

             Pregnancy status (test code = No                                   

  



             96624-0)                                            

 

             Date and time of symptom onset Unknown                             

   



             (test code = 92819-1)                                        



[facility]-CoV-2 (COVID-19) RNA [Presence] in 
Respiratory specimen by EDWARD with probe [id number] 23:10:10





             Test Item    Value        Reference Range Interpretation Comments

 

             SARS-CoV-2 (COVID-19) RNA Not detected                           



             [Presence] in Respiratory                                        



             specimen by EDWARD with probe                                        



             detection (test code = 31644-0)                                    

    

 

             Whether patient is employed in a No                                

     



             healthcare setting (test code =                                    

    



             97295-1)                                            

 

             Whether the patient has symptoms Yes                               

     



             related to condition of interest                                   

     



             (test code = 51934-0)                                        

 

             Whether the patient was No                                     



             hospitalized for condition of                                      

  



             interest (test code = 31993-6)                                     

   

 

             Whether the patient was admitted No                                

     



             to intensive care unit (ICU) for                                   

     



             condition of interest (test code                                   

     



             = 54189-3)                                          

 

             Whether patient resides in a No                                    

 



             congregate care setting (test                                      

  



             code = 37739-2)                                        

 

             Pregnancy status (test code = No                                   

  



             70819-9)                                            

 

             Date and time of symptom onset Unknown                             

   



             (test code = 53145-9)                                        



[facility]-CoV-2 (COVID-19) RNA [Presence] in 
Respiratory specimen by EDWARD with probe [id number] 22:34:30





             Test Item    Value        Reference Range Interpretation Comments

 

             SARS-CoV-2 (COVID-19) RNA Not detected Not-Detected              



             [Presence] in Respiratory                                        



             specimen by EDWARD with probe                                        



             detection (test code = 94919-8)                                    

    

 

             Whether patient is employed in a                                   

     



             healthcare setting (test code =                                    

    



             29084-6)                                            

 

             Whether the patient has symptoms                                   

     



             related to condition of interest                                   

     



             (test code = 28026-4)                                        

 

             Patient was hospitalized because                                   

     



             of this condition (test code =                                     

   



             13934-6)                                            

 

             Whether the patient was admitted                                   

     



             to intensive care unit (ICU) for                                   

     



             condition of interest (test code                                   

     



             = 65559-9)                                          

 

             Whether patient resides in a                                       

 



             congregate care setting (test                                      

  



             code = 88022-1)                                        



[facility]-CoV-2 (COVID-19) RNA [Presence] in 
Respiratory specimen by EDWARD with probe [id number] 22:35:42





             Test Item    Value        Reference Range Interpretation Comments

 

             SARS-CoV-2 (COVID-19) RNA Not detected Not-Detected              



             [Presence] in Respiratory                                        



             specimen by EDWARD with probe                                        



             detection (test code = 44845-7)                                    

    

 

             Whether patient is employed in a                                   

     



             healthcare setting (test code =                                    

    



             72187-9)                                            

 

             Whether the patient has symptoms                                   

     



             related to condition of interest                                   

     



             (test code = 87436-2)                                        

 

             Patient was hospitalized because                                   

     



             of this condition (test code =                                     

   



             88420-0)                                            

 

             Whether the patient was admitted                                   

     



             to intensive care unit (ICU) for                                   

     



             condition of interest (test code                                   

     



             = 84249-8)                                          

 

             Whether patient resides in a                                       

 



             congregate care setting (test                                      

  



             code = 58843-0)                                        



[facility]-CoV-2 (COVID-19) RNA [Presence] in 
Respiratory specimen by EDWARD with probe [id number] 22:46:10





             Test Item    Value        Reference Range Interpretation Comments

 

             SARS-CoV-2 (COVID-19) RNA Not detected Not-Detected              



             [Presence] in Respiratory                                        



             specimen by EDWARD with probe                                        



             detection (test code = 36056-1)                                    

    

 

             Whether patient is employed in a                                   

     



             healthcare setting (test code =                                    

    



             91260-6)                                            

 

             Whether the patient has symptoms                                   

     



             related to condition of interest                                   

     



             (test code = 33092-3)                                        

 

             Patient was hospitalized because                                   

     



             of this condition (test code =                                     

   



             03062-1)                                            

 

             Whether the patient was admitted                                   

     



             to intensive care unit (ICU) for                                   

     



             condition of interest (test code                                   

     



             = 66688-7)                                          

 

             Whether patient resides in a                                       

 



             congregate care setting (test                                      

  



             code = 71606-1)                                        



[facility]-CoV-2 (COVID-19) RNA [Presence] in 
Respiratory specimen by EDWARD with probe [id number] 01:54:24





             Test Item    Value        Reference Range Interpretation Comments

 

             SARS-CoV-2 (COVID-19) RNA Not detected Not-Detected              



             [Presence] in Respiratory                                        



             specimen by EDWARD with probe                                        



             detection (test code = 61085-3)                                    

    

 

             Whether patient is employed in a                                   

     



             healthcare setting (test code =                                    

    



             77377-8)                                            

 

             Whether the patient has symptoms                                   

     



             related to condition of interest                                   

     



             (test code = 07970-4)                                        

 

             Patient was hospitalized because                                   

     



             of this condition (test code =                                     

   



             92061-4)                                            

 

             Whether the patient was admitted                                   

     



             to intensive care unit (ICU) for                                   

     



             condition of interest (test code                                   

     



             = 38893-3)                                          

 

             Whether patient resides in a                                       

 



             congregate care setting (test                                      

  



             code = 99306-7)                                        



[facility]-CoV-2 (COVID-19) RNA [Presence] in 
Respiratory specimen by EDWARD with probe [id number] 21:44:06





             Test Item    Value        Reference Range Interpretation Comments

 

             SARS-CoV-2 (COVID-19) RNA Not detected Not-Detected              



             [Presence] in Respiratory                                        



             specimen by EDWARD with probe                                        



             detection (test code = 12935-1)                                    

    

 

             Whether patient is employed in a                                   

     



             healthcare setting (test code =                                    

    



             47326-9)                                            

 

             Whether the patient has symptoms                                   

     



             related to condition of interest                                   

     



             (test code = 86039-1)                                        

 

             Patient was hospitalized because                                   

     



             of this condition (test code =                                     

   



             17144-4)                                            

 

             Whether the patient was admitted                                   

     



             to intensive care unit (ICU) for                                   

     



             condition of interest (test code                                   

     



             = 58647-8)                                          

 

             Whether patient resides in a                                       

 



             congregate care setting (test                                      

  



             code = 46235-0)                                        



[facility]-CoV-2 (COVID-19) RNA [Presence] in 
Respiratory specimen by EDWARD with probe [id number] 00:36:59





             Test Item    Value        Reference Range Interpretation Comments

 

             SARS-CoV-2 (COVID-19) RNA Not detected Not-Detected              



             [Presence] in Respiratory                                        



             specimen by EDWARD with probe                                        



             detection (test code = 69470-5)                                    

    



[facility]-CoV-2 (COVID-19) RNA [Presence] in 
Respiratory specimen by EDWARD with probe tobeeuzpf2916-22-79 17:35:35





             Test Item    Value        Reference Range Interpretation Comments

 

             SARS-CoV-2 (COVID-19) RNA Not detected Not-Detected              



             [Presence] in Respiratory                                        



             specimen by EDWARD with probe                                        



             detection (test code = 70680-6)                                    

    



[facility]-CoV-[facility] (COVID-19) RNA [Presence] in 
Respiratory specimen by EDWARD with probe [id number] 18:03:30





             Test Item    Value        Reference Range Interpretation Comments

 

             SARS-CoV-2 (COVID-19) RNA Not detected Not-Detected              



             [Presence] in Respiratory                                        



             specimen by EDWARD with probe                                        



             detection (test code = 75747-6)                                    

    



[facility]-CoV-2 (COVID-19) RNA [Presence] in 
Respiratory specimen by EDWARD with probe [id number] 10:06:03





             Test Item    Value        Reference Range Interpretation Comments

 

             SARS-CoV-2 (COVID-19) RNA Not detected Not-Detected              



             [Presence] in Respiratory                                        



             specimen by EDWARD with probe                                        



             detection (test code = 74523-3)                                    

    



[facility]-CoV-2 (COVID-19) RNA [Presence] in 
Respiratory specimen by EDWARD with probe [id number] 05:11:58





             Test Item    Value        Reference Range Interpretation Comments

 

             SARS-CoV-2 (COVID-19) RNA Not detected Not-Detected              



             [Presence] in Respiratory                                        



             specimen by EDWARD with probe                                        



             detection (test code = 85230-8)                                    

    



[facility]-CoV-2 (COVID-19) RNA [Presence] in 
Respiratory specimen by EDWARD with probe psyafoqig3539-06-20 03:34:16





             Test Item    Value        Reference Range Interpretation Comments

 

             SARS-CoV-2 (COVID-19) RNA Not detected Not-Detected              



             [Presence] in Respiratory                                        



             specimen by EDWARD with probe                                        



             detection (test code = 39257-9)                                    

    



[facility]-CoV-2 (COVID-19) RNA [Presence] in 
Respiratory specimen by EDWARD with probe [id number] 10:06:41





             Test Item    Value        Reference Range Interpretation Comments

 

             SARS-CoV-2 (COVID-19) RNA Not detected Not-Detected              



             [Presence] in Respiratory                                        



             specimen by EDWARD with probe                                        



             detection (test code = 69477-4)                                    

    



[facility]





             Test Item    Value        Reference Range Interpretation Comments

 

             Chol (test code = Chol) 129                                    



[facility]2020-10-14 12:33:00





             Test Item    Value        Reference Range Interpretation Comments

 

             HDL (test code = HDL) 37                                     



[facility]2020-10-14 12:33:00





             Test Item    Value        Reference Range Interpretation Comments

 

             Trig (test code = Trig) 76                                     



[facility]2020-10-14 12:33:00





             Test Item    Value        Reference Range Interpretation Comments

 

             LDL (Calculated) (test code = LDL 76                               

      



             (Calculated))                                        



[facility]2020-10-14 12:33:00





             Test Item    Value        Reference Range Interpretation Comments

 

             CHD Risk (test code = CHD Risk) 3.5                                

    



[facility]





             Test Item    Value        Reference Range Interpretation Comments

 

             Non HDL Chol (test code = Non HDL Chol) 92                         

            



[facility]2020-10-14 12:33:00





             Test Item    Value        Reference Range Interpretation Comments

 

             Chol (test code = Chol) 129                                    



[facility]2020-10-14 12:33:00





             Test Item    Value        Reference Range Interpretation Comments

 

             HDL (test code = HDL) 37                                     



[facility]2020-10-14 12:33:00





             Test Item    Value        Reference Range Interpretation Comments

 

             Trig (test code = Trig) 76                                     



[facility]





             Test Item    Value        Reference Range Interpretation Comments

 

             LDL (Calculated) (test code = LDL 76                               

      



             (Calculated))                                        



[facility]2020-10-14 12:33:00





             Test Item    Value        Reference Range Interpretation Comments

 

             CHD Risk (test code = CHD Risk) 3.5                                

    



[facility]





             Test Item    Value        Reference Range Interpretation Comments

 

             Non HDL Chol (test code = Non HDL Chol) 92                         

            



[facility]





             Test Item    Value        Reference Range Interpretation Comments

 

             Chol (test code = Chol) 129                                    



[facility]2020-10-14 12:33:00





             Test Item    Value        Reference Range Interpretation Comments

 

             HDL (test code = HDL) 37                                     



[facility]





             Test Item    Value        Reference Range Interpretation Comments

 

             Trig (test code = Trig) 76                                     



[facility]2020-10-14 12:33:00





             Test Item    Value        Reference Range Interpretation Comments

 

             LDL (Calculated) (test code = LDL 76                               

      



             (Calculated))                                        



[facility]





             Test Item    Value        Reference Range Interpretation Comments

 

             CHD Risk (test code = CHD Risk) 3.5                                

    



[facility]





             Test Item    Value        Reference Range Interpretation Comments

 

             Non HDL Chol (test code = Non HDL Chol) 92                         

            



[facility]





             Test Item    Value        Reference Range Interpretation Comments

 

             Chol (test code = Chol) 129                                    



[facility]





             Test Item    Value        Reference Range Interpretation Comments

 

             HDL (test code = HDL) 37                                     



[facility]





             Test Item    Value        Reference Range Interpretation Comments

 

             Trig (test code = Trig) 76                                     



[facility]





             Test Item    Value        Reference Range Interpretation Comments

 

             LDL (Calculated) (test code = LDL 76                               

      



             (Calculated))                                        



[facility]2020-10-14 12:33:00





             Test Item    Value        Reference Range Interpretation Comments

 

             CHD Risk (test code = CHD Risk) 3.5                                

    



[facility]





             Test Item    Value        Reference Range Interpretation Comments

 

             Non HDL Chol (test code = Non HDL Chol) 92                         

            



[facility]2020-10-14 12:33:00





             Test Item    Value        Reference Range Interpretation Comments

 

             Chol (test code = Chol) 129                                    



[facility]2020-10-14 12:33:00





             Test Item    Value        Reference Range Interpretation Comments

 

             HDL (test code = HDL) 37                                     



[facility]2020-10-14 12:33:00





             Test Item    Value        Reference Range Interpretation Comments

 

             Trig (test code = Trig) 76                                     



[facility] 12:33:00





             Test Item    Value        Reference Range Interpretation Comments

 

             LDL (Calculated) (test code = LDL 76                               

      



             (Calculated))                                        



[facility]2020-10-14 12:33:00





             Test Item    Value        Reference Range Interpretation Comments

 

             CHD Risk (test code = CHD Risk) 3.5                                

    



[facility]2020-10-14 12:33:00





             Test Item    Value        Reference Range Interpretation Comments

 

             Non HDL Chol (test code = Non HDL Chol) 92                         

            



[facility]2020-10-14 12:33:00





             Test Item    Value        Reference Range Interpretation Comments

 

             Chol (test code = Chol) 129                                    



[facility]2020-10-14 12:33:00





             Test Item    Value        Reference Range Interpretation Comments

 

             HDL (test code = HDL) 37                                     



[facility]2020-10-14 12:33:00





             Test Item    Value        Reference Range Interpretation Comments

 

             Trig (test code = Trig) 76                                     



[facility]2020-10-14 12:33:00





             Test Item    Value        Reference Range Interpretation Comments

 

             LDL (Calculated) (test code = LDL 76                               

      



             (Calculated))                                        



[facility]2020-10-14 12:33:00





             Test Item    Value        Reference Range Interpretation Comments

 

             CHD Risk (test code = CHD Risk) 3.5                                

    



[facility]2020-10-14 12:33:00





             Test Item    Value        Reference Range Interpretation Comments

 

             Non HDL Chol (test code = Non HDL Chol) 92                         

            



[facility]2020-10-14 12:33:00





             Test Item    Value        Reference Range Interpretation Comments

 

             Chol (test code = Chol) 129                                    



[facility]2020-10-14 12:33:00





             Test Item    Value        Reference Range Interpretation Comments

 

             HDL (test code = HDL) 37                                     



[facility]2020-10-14 12:33:00





             Test Item    Value        Reference Range Interpretation Comments

 

             Trig (test code = Trig) 76                                     



[place] [address]-10-14 12:33:00





             Test Item    Value        Reference Range Interpretation Comments

 

             LDL (Calculated) (test code = LDL 76                               

      



             (Calculated))                                        



[facility]2020-10-14 12:33:00





             Test Item    Value        Reference Range Interpretation Comments

 

             CHD Risk (test code = CHD Risk) 3.5                                

    



[facility]2020-10-14 12:33:00





             Test Item    Value        Reference Range Interpretation Comments

 

             Non HDL Chol (test code = Non HDL Chol) 92                         

            



[facility] 12:33:00





             Test Item    Value        Reference Range Interpretation Comments

 

             Chol (test code = Chol) 129                                    



[facility]2020-10-14 12:33:00





             Test Item    Value        Reference Range Interpretation Comments

 

             HDL (test code = HDL) 37                                     



[facility]2020-10-14 12:33:00





             Test Item    Value        Reference Range Interpretation Comments

 

             Trig (test code = Trig) 76                                     



[facility] 12:33:00





             Test Item    Value        Reference Range Interpretation Comments

 

             LDL (Calculated) (test code = LDL 76                               

      



             (Calculated))                                        



[facility]-10-14 12:33:00





             Test Item    Value        Reference Range Interpretation Comments

 

             CHD Risk (test code = CHD Risk) 3.5                                

    



[facility]2020-10-14 12:33:00





             Test Item    Value        Reference Range Interpretation Comments

 

             Non HDL Chol (test code = Non HDL Chol) 92                         

            



[facility] [employer] [id number] 14:47:00





             Test Item    Value        Reference Range Interpretation Comments

 

             Vitamin D, 25-OH, Total (test code = 37                      

         



             Vitamin D, 25-OH, Total)                                        



[facility] [employer] [id number] 14:47:00





             Test Item    Value        Reference Range Interpretation Comments

 

             U Creat mg/dL (test code = U Creat 114                       

       



             mg/dL)                                              



[facility] [employer] [id number] 14:47:00





             Test Item    Value        Reference Range Interpretation Comments

 

             U Prot/Creat (test code = U Prot/Creat) 430                  

            



[facility] [id number] 14:47:00





             Test Item    Value        Reference Range Interpretation Comments

 

             U Prot/Creat (test code = U 0.430 1      0.021-0.161               



             Prot/Creat)                                         



[facility]2020-08-06 14:47:00





             Test Item    Value        Reference Range Interpretation Comments

 

             U Protein (test code = U Protein) 49           5-24                

      



[place] 664290-08-06 14:47:00





             Test Item    Value        Reference Range Interpretation Comments

 

             Glucose Lvl (test code = Glucose Lvl) 103          65-99           

          



[place] 664290-08-06 14:47:00





             Test Item    Value        Reference Range Interpretation Comments

 

             BUN (test code = BUN) 24           7-25                      



[place] 664290-08-06 14:47:00





             Test Item    Value        Reference Range Interpretation Comments

 

             Creatinine Lvl (test code = Creatinine 1.32         0.50-0.99      

           



             Lvl)                                                



[place] 664290-08-06 14:47:00





             Test Item    Value        Reference Range Interpretation Comments

 

             eGFR NON-AFR. AMERICAN (test code = 43                             

        



             eGFR NON-AFR. AMERICAN)                                        



[place] 664290-08-06 14:47:00





             Test Item    Value        Reference Range Interpretation Comments

 

             eGFR  (test code = eGFR 50                         

            



             )                                        



[place] 664290-08-06 14:47:00





             Test Item    Value        Reference Range Interpretation Comments

 

             B/C Ratio (test code = B/C Ratio) 18           6-22                

      



[place] 664290-08-06 14:47:00





             Test Item    Value        Reference Range Interpretation Comments

 

             Sodium Lvl (test code = Sodium Lvl) 138          135-146           

        



[place] 664290-08-06 14:47:00





             Test Item    Value        Reference Range Interpretation Comments

 

             Potassium Lvl (test code = Potassium 4.4          3.5-5.3          

         



             Lvl)                                                



[place] 664290-08-06 14:47:00





             Test Item    Value        Reference Range Interpretation Comments

 

             Chloride Lvl (test code = Chloride Lvl) 102                  

            



[place] 664290-08-06 14:47:00





             Test Item    Value        Reference Range Interpretation Comments

 

             CO2 (test code = CO2) 30           20-32                     



[place] 664290-08-06 14:47:00





             Test Item    Value        Reference Range Interpretation Comments

 

             Calcium Lvl (test code = Calcium Lvl) 9.4          8.6-10.4        

          



[place] 664290-08-06 14:47:00





             Test Item    Value        Reference Range Interpretation Comments

 

             Phosphorus (test code = Phosphorus) 4.8          2.5-4.5           

        



[facility]2020-08-06 14:47:00





             Test Item    Value        Reference Range Interpretation Comments

 

             Albumin Lvl (test code = Albumin Lvl) 3.9          3.6-5.1         

          



[facility]





             Test Item    Value        Reference Range Interpretation Comments

 

             Plt Count Estimated (test code = DECREASED                         

     



             Plt Count Estimated)                                        



[facility]ue[facility]geAGYQTZZPUN5009-73-89 14:47:00





             Test Item    Value        Reference Range Interpretation Comments

 

             WBC X 10x3 (test code = WBC X 10x3) 7.2          3.8-10.8          

        



[facility] CenterAkuggmnMQAWODZXMD6523-94-82 14:47:00





             Test Item    Value        Reference Range Interpretation Comments

 

             RBC X 10x6 (test code = RBC X 10x6) 3.71         3.80-5.10         

        



[facility] CenterTpqbimoMPABRXNQMV2037-62-82 14:47:00





             Test Item    Value        Reference Range Interpretation Comments

 

             Hgb (test code = Hgb) 10.7         11.7-15.5                 



[facility] CenterSirqeygORLYQQSZOF9627-25-69 14:47:00





             Test Item    Value        Reference Range Interpretation Comments

 

             Hct (test code = Hct) 33.9         35.0-45.0                 



[facility] CenterIqptlltAMFPRZFDTD4611-55-10 14:47:00





             Test Item    Value        Reference Range Interpretation Comments

 

             MCV (test code = MCV) 91.4         80.0-100.0                



[facility] CenterIkxqdofMSCPNEIDFG0081-62-17 14:47:00





             Test Item    Value        Reference Range Interpretation Comments

 

             MCH (test code = MCH) 28.8 pg      27.0-33.0                 



[facility] CenterSumpuhnRQHADYIPXG0197-52-93 14:47:00





             Test Item    Value        Reference Range Interpretation Comments

 

             MCHC (test code = MCHC) 31.6         32.0-36.0                 



[facility] CenterCfuxangIOEXUCRWSK0000-53-11 14:47:00





             Test Item    Value        Reference Range Interpretation Comments

 

             RDW (test code = RDW) 13.9         11.0-15.0                 



[facility] CenterCcswnibNIGKAANIDA0951-43-35 14:47:00





             Test Item    Value        Reference Range Interpretation Comments

 

             Platelet (test code = Platelet) 110          140-400               

    



[facility] CenterWkpnwnmMBTBBEXHLF8756-04-17 14:47:00





             Test Item    Value        Reference Range Interpretation Comments

 

             MPV (test code = MPV) 11.7         7.5-12.5                  



[facility] SystemLutgckeHECQDYLNPP3769-67-40 14:47:00





             Test Item    Value        Reference Range Interpretation Comments

 

             Neutrophils # (test code = Neutrophils 5414         0028-8866      

           



             #)                                                  



[facility]VvfvurpAMSZNNJODB1377-04-74 14:47:00





             Test Item    Value        Reference Range Interpretation Comments

 

             Lymphocytes # (test code = Lymphocytes 1152         850-3900       

           



             #)                                                  



[facility] CenterDxqinwuEIOOFAFMWJ5953-20-71 14:47:00





             Test Item    Value        Reference Range Interpretation Comments

 

             Monocytes # (test code = Monocytes #) 461          200-950         

          



[facility] CenterTrjroymGKGRQZYKKW1476-65-62 14:47:00





             Test Item    Value        Reference Range Interpretation Comments

 

             Eosinophils # (test code = Eosinophils 122                   

           



             #)                                                  



[facility] SystemNzzoiwpKSXSBWSJYT8997-52-97 14:47:00





             Test Item    Value        Reference Range Interpretation Comments

 

             Basophils # (test code 50           See_Comment                [Aut

omated message] The



             = Basophils #)                                        system which 

generated



                                                                 this result tra

nsmitted



                                                                 reference range

: <=200.



                                                                 The reference r

cheyenne was



                                                                 not used to int

erpret



                                                                 this result as



                                                                 normal/abnormal

.



[facility] HospitalBpfvuxyRDZWVWDALU9399-95-05 14:47:00





             Test Item    Value        Reference Range Interpretation Comments

 

             Segs (test code = Segs) 75.2                                   



[facility] SystemJkbtqziPLILCYCQDP6766-16-08 14:47:00





             Test Item    Value        Reference Range Interpretation Comments

 

             Lymphocytes (test code = Lymphocytes) 16.0                         

          



[facility] SystemXuqfdznHFGDUIFYXV0387-52-93 14:47:00





             Test Item    Value        Reference Range Interpretation Comments

 

             Monocytes (test code = Monocytes) 6.4                              

      



[facility] HospitalFbhzdttCVUCGSGHCW6233-05-17 14:47:00





             Test Item    Value        Reference Range Interpretation Comments

 

             Eosinophils (test code = Eosinophils) 1.7                          

          



[facility] CenterZvqkavsRLPQKMKRSQ3109-58-22 14:47:00





             Test Item    Value        Reference Range Interpretation Comments

 

             Basophils (test code = Basophils) 0.7                              

      



[facility] CenterannREFER[facility] LAB TTAILJN1632-05-08 14:47:00





             Test Item    Value        Reference Range Interpretation Comments

 

             Result 2 (Urine Culture) See Result Comment                        

   



             (test code = Result 2                                        



             (Urine Culture))                                        



[facility] CenterannUR[facility] AND [id number] 14:47:00





             Test Item    Value        Reference Range Interpretation Comments

 

             UA Color (test code = UA Color) YELLOW                             

    



[facility]ann[facility] [id number] 14:47:00





             Test Item    Value        Reference Range Interpretation Comments

 

             UA Turbidity (test code = UA CLOUDY                                

 



             Turbidity)                                          



[facility] [id number] 14:47:00





             Test Item    Value        Reference Range Interpretation Comments

 

             UA Spec Grav (test code = UA Spec 1.017 1      1.001-1.035         

      



             Grav)                                               



[facility] [id number] 14:47:00





             Test Item    Value        Reference Range Interpretation Comments

 

             UA pH (test code = UA pH) 5.5 1        5.0-8.0                   



[facility] [id number] 14:47:00





             Test Item    Value        Reference Range Interpretation Comments

 

             UA Glucose (test code = UA Glucose) NEGATIVE                       

        



[facility] [id number] 14:47:00





             Test Item    Value        Reference Range Interpretation Comments

 

             UA Bili (test code = UA Bili) NEGATIVE                             

  



[facility] [id number] 14:47:00





             Test Item    Value        Reference Range Interpretation Comments

 

             UA Ketones (test code = UA Ketones) NEGATIVE                       

        



[facility] [id number] 14:47:00





             Test Item    Value        Reference Range Interpretation Comments

 

             UA Blood (test code = UA Blood) 1+                                 

    



[facility] [id number] 14:47:00





             Test Item    Value        Reference Range Interpretation Comments

 

             UA Protein (test code = UA Protein) 1+                             

        



[facility] [id number] 14:47:00





             Test Item    Value        Reference Range Interpretation Comments

 

             UA Nitrite (test code = UA Nitrite) POSITIVE                       

        



[facility] [id number] 14:47:00





             Test Item    Value        Reference Range Interpretation Comments

 

             UA Leuk Est (test code = UA Leuk Est) 2+                           

          



[facility] [id number] 14:47:00





             Test Item    Value        Reference Range Interpretation Comments

 

             UA WBC (test code = UA WBC) > OR = 60                              



[facility] [id number] 14:47:00





             Test Item    Value        Reference Range Interpretation Comments

 

             UA RBC (test code = UA RBC) 0-2                                    



[facility] [id number] 14:47:00





             Test Item    Value        Reference Range Interpretation Comments

 

             UA Sq Epi (test code = UA Sq Epi) NONE SEEN                        

      



[facility] [id number] 14:47:00





             Test Item    Value        Reference Range Interpretation Comments

 

             UA Bacteria (test code = UA Bacteria) MANY                         

          



[facility] [id number] 14:47:00





             Test Item    Value        Reference Range Interpretation Comments

 

             UA Hyal Cast (test code = UA Hyal NONE SEEN                        

      



             Cast)                                               



[facility] AND [id number] 14:47:00





             Test Item    Value        Reference Range Interpretation Comments

 

             UA Reflex (test code CULTURE INDICATED -                           



             = UA Reflex) RESULTS TO FOLLOW                           



[facility]2020-08-06 14:47:00





             Test Item    Value        Reference Range Interpretation Comments

 

             U Creat mg/dL (test code = U Creat 114                       

       



             mg/dL)                                              



[place] 017650-08-06 14:47:00





             Test Item    Value        Reference Range Interpretation Comments

 

             U Alb (test code = U Alb) 16.7                                   



[place] 017650-08-06 14:47:00





             Test Item    Value        Reference Range Interpretation Comments

 

             U Alb/Crea (test code = U Alb/Crea) 146                            

        



[facility]2020-08-06 14:47:00





             Test Item    Value        Reference Range Interpretation Comments

 

             Vitamin D, 25-OH, Total (test code = 37                      

         



             Vitamin D, 25-OH, Total)                                        



[place] 664290-08-06 14:47:00





             Test Item    Value        Reference Range Interpretation Comments

 

             U Creat mg/dL (test code = U Creat 114                       

       



             mg/dL)                                              



[place] 664290-08-06 14:47:00





             Test Item    Value        Reference Range Interpretation Comments

 

             U Prot/Creat (test code = U Prot/Creat) 430                  

            



[facility]2020-08-06 14:47:00





             Test Item    Value        Reference Range Interpretation Comments

 

             U Prot/Creat (test code = U 0.430 1      0.021-0.161               



             Prot/Creat)                                         



[place] 664290-08-06 14:47:00





             Test Item    Value        Reference Range Interpretation Comments

 

             U Protein (test code = U Protein) 49           5-24                

      



[place] 664290-08-06 14:47:00





             Test Item    Value        Reference Range Interpretation Comments

 

             Glucose Lvl (test code = Glucose Lvl) 103          65-99           

          



[place] 664290-08-06 14:47:00





             Test Item    Value        Reference Range Interpretation Comments

 

             BUN (test code = BUN) 24           7-25                      



[place] 664290-08-06 14:47:00





             Test Item    Value        Reference Range Interpretation Comments

 

             Creatinine Lvl (test code = Creatinine 1.32         0.50-0.99      

           



             Lvl)                                                



[place] 664290-08-06 14:47:00





             Test Item    Value        Reference Range Interpretation Comments

 

             eGFR NON-AFR. AMERICAN (test code = 43                             

        



             eGFR NON-AFR. AMERICAN)                                        



[place] 664290-08-06 14:47:00





             Test Item    Value        Reference Range Interpretation Comments

 

             eGFR  (test code = eGFR 50                         

            



             )                                        



[place] 664290-08-06 14:47:00





             Test Item    Value        Reference Range Interpretation Comments

 

             B/C Ratio (test code = B/C Ratio) 18           6-22                

      



[place] 664290-08-06 14:47:00





             Test Item    Value        Reference Range Interpretation Comments

 

             Sodium Lvl (test code = Sodium Lvl) 138          135-146           

        



[place] 664290-08-06 14:47:00





             Test Item    Value        Reference Range Interpretation Comments

 

             Potassium Lvl (test code = Potassium 4.4          3.5-5.3          

         



             Lvl)                                                



[place] 664290-08-06 14:47:00





             Test Item    Value        Reference Range Interpretation Comments

 

             Chloride Lvl (test code = Chloride Lvl) 102                  

            



[place] 664290-08-06 14:47:00





             Test Item    Value        Reference Range Interpretation Comments

 

             CO2 (test code = CO2) 30           20-32                     



[place] 664290-08-06 14:47:00





             Test Item    Value        Reference Range Interpretation Comments

 

             Calcium Lvl (test code = Calcium Lvl) 9.4          8.6-10.4        

          



[place] 664290-08-06 14:47:00





             Test Item    Value        Reference Range Interpretation Comments

 

             Phosphorus (test code = Phosphorus) 4.8          2.5-4.5           

        



[place] [address]-08-06 14:47:00





             Test Item    Value        Reference Range Interpretation Comments

 

             Albumin Lvl (test code = Albumin Lvl) 3.9          3.6-5.1         

          



[place] 733810-08-06 14:47:00





             Test Item    Value        Reference Range Interpretation Comments

 

             Plt Count Estimated (test code = DECREASED                         

     



             Plt Count Estimated)                                        



[place] 733810-08-06 14:47:00





             Test Item    Value        Reference Range Interpretation Comments

 

             WBC X 10x3 (test code = WBC X 10x3) 7.2          3.8-10.8          

        



[facility] CenterYgsiszvXYTPHJGROG7209-24-24 14:47:00





             Test Item    Value        Reference Range Interpretation Comments

 

             RBC X 10x6 (test code = RBC X 10x6) 3.71         3.80-5.10         

        



[facility] CenterCtjhwxoUCTASXURKU0797-67-33 14:47:00





             Test Item    Value        Reference Range Interpretation Comments

 

             Hgb (test code = Hgb) 10.7         11.7-15.5                 



[facility] CenterPeclqayBAACPLNDNF6885-37-35 14:47:00





             Test Item    Value        Reference Range Interpretation Comments

 

             Hct (test code = Hct) 33.9         35.0-45.0                 



[facility] CenterNqtneykJENWRYQNHB4489-44-80 14:47:00





             Test Item    Value        Reference Range Interpretation Comments

 

             MCV (test code = MCV) 91.4         80.0-100.0                



[facility] CenterHyqrmgsLLXHAHVHOI9212-04-36 14:47:00





             Test Item    Value        Reference Range Interpretation Comments

 

             MCH (test code = MCH) 28.8 pg      27.0-33.0                 



[facility] CenterAttzfqoTTYZCWPKFJ6756-79-23 14:47:00





             Test Item    Value        Reference Range Interpretation Comments

 

             MCHC (test code = MCHC) 31.6         32.0-36.0                 



[facility] CenterFcflafpHFDKYISLMX4853-03-77 14:47:00





             Test Item    Value        Reference Range Interpretation Comments

 

             RDW (test code = RDW) 13.9         11.0-15.0                 



[facility] CenterWypxkgjIVYAGDQGYC2314-18-30 14:47:00





             Test Item    Value        Reference Range Interpretation Comments

 

             Platelet (test code = Platelet) 110          140-400               

    



[facility] CenterRrrrqbvWCJXDBRGBQ3032-38-72 14:47:00





             Test Item    Value        Reference Range Interpretation Comments

 

             MPV (test code = MPV) 11.7         7.5-12.5                  



[facility] CenterWstwjmhGILPXQREOW5826-97-23 14:47:00





             Test Item    Value        Reference Range Interpretation Comments

 

             Neutrophils # (test code = Neutrophils 5414         9447-5233      

           



             #)                                                  



[facility] CenterYedfmduLPXLYLUBYT7600-83-64 14:47:00





             Test Item    Value        Reference Range Interpretation Comments

 

             Lymphocytes # (test code = Lymphocytes 1152         850-3900       

           



             #)                                                  



[facility] CenterVinokcuGPRZNDZYPP0151-42-13 14:47:00





             Test Item    Value        Reference Range Interpretation Comments

 

             Monocytes # (test code = Monocytes #) 461          200-950         

          



[facility] CenterKujbvelTNRKLTEUIO2016-43-34 14:47:00





             Test Item    Value        Reference Range Interpretation Comments

 

             Eosinophils # (test code = Eosinophils 122                   

           



             #)                                                  



[facility] SystemVjeoeqhDQJFNOKQJJ4833-41-28 14:47:00





             Test Item    Value        Reference Range Interpretation Comments

 

             Basophils # (test code 50           See_Comment                [Aut

omated message] The



             = Basophils #)                                        system which 

generated



                                                                 this result tra

nsmitted



                                                                 reference range

: <=200.



                                                                 The reference r

cheyenne was



                                                                 not used to int

erpret



                                                                 this result as



                                                                 normal/abnormal

.



[facility] HospitalMidkbllHANRFXATLN7118-46-24 14:47:00





             Test Item    Value        Reference Range Interpretation Comments

 

             Segs (test code = Segs) 75.2                                   



[facility] SystemZhayhpjVJXLZZRMTX3619-06-13 14:47:00





             Test Item    Value        Reference Range Interpretation Comments

 

             Lymphocytes (test code = Lymphocytes) 16.0                         

          



[facility] SystemAmgrbeqNRGRAEWDDN7344-55-11 14:47:00





             Test Item    Value        Reference Range Interpretation Comments

 

             Monocytes (test code = Monocytes) 6.4                              

      



[facility] SystemZfbyjfjGZYSKDVQRE1668-30-03 14:47:00





             Test Item    Value        Reference Range Interpretation Comments

 

             Eosinophils (test code = Eosinophils) 1.7                          

          



[facility] CenterLujdszyVYCNILDPEL4457-18-30 14:47:00





             Test Item    Value        Reference Range Interpretation Comments

 

             Basophils (test code = Basophils) 0.7                              

      



[facility] [id number] 14:47:00





             Test Item    Value        Reference Range Interpretation Comments

 

             Result 2 (Urine Culture) See Result Comment                        

   



             (test code = Result 2                                        



             (Urine Culture))                                        



[facility] AND [id number] 14:47:00





             Test Item    Value        Reference Range Interpretation Comments

 

             UA Color (test code = UA Color) YELLOW                             

    



[facility] AND [id number] 14:47:00





             Test Item    Value        Reference Range Interpretation Comments

 

             UA Turbidity (test code = UA CLOUDY                                

 



             Turbidity)                                          



[facility] [id number] 14:47:00





             Test Item    Value        Reference Range Interpretation Comments

 

             UA Spec Grav (test code = UA Spec 1.017 1      1.001-1.035         

      



             Grav)                                               



[facility] AND [id number] 14:47:00





             Test Item    Value        Reference Range Interpretation Comments

 

             UA pH (test code = UA pH) 5.5 1        5.0-8.0                   



[facility] [id number] 14:47:00





             Test Item    Value        Reference Range Interpretation Comments

 

             UA Glucose (test code = UA Glucose) NEGATIVE                       

        



[facility] [id number] 14:47:00





             Test Item    Value        Reference Range Interpretation Comments

 

             UA Bili (test code = UA Bili) NEGATIVE                             

  



[facility] [id number] 14:47:00





             Test Item    Value        Reference Range Interpretation Comments

 

             UA Ketones (test code = UA Ketones) NEGATIVE                       

        



[facility] [id number] 14:47:00





             Test Item    Value        Reference Range Interpretation Comments

 

             UA Blood (test code = UA Blood) 1+                                 

    



[facility] [id number] 14:47:00





             Test Item    Value        Reference Range Interpretation Comments

 

             UA Protein (test code = UA Protein) 1+                             

        



[facility] [id number] 14:47:00





             Test Item    Value        Reference Range Interpretation Comments

 

             UA Nitrite (test code = UA Nitrite) POSITIVE                       

        



[facility] [id number] 14:47:00





             Test Item    Value        Reference Range Interpretation Comments

 

             UA Leuk Est (test code = UA Leuk Est) 2+                           

          



[facility] [id number] 14:47:00





             Test Item    Value        Reference Range Interpretation Comments

 

             UA WBC (test code = UA WBC) > OR = 60                              



[facility] [id number] 14:47:00





             Test Item    Value        Reference Range Interpretation Comments

 

             UA RBC (test code = UA RBC) 0-2                                    



[facility] [id number] 14:47:00





             Test Item    Value        Reference Range Interpretation Comments

 

             UA Sq Epi (test code = UA Sq Epi) NONE SEEN                        

      



[facility] [id number] 14:47:00





             Test Item    Value        Reference Range Interpretation Comments

 

             UA Bacteria (test code = UA Bacteria) MANY                         

          



[facility] [id number] 14:47:00





             Test Item    Value        Reference Range Interpretation Comments

 

             UA Hyal Cast (test code = UA Hyal NONE SEEN                        

      



             Cast)                                               



[facility] [id number] 14:47:00





             Test Item    Value        Reference Range Interpretation Comments

 

             UA Reflex (test code CULTURE INDICATED -                           



             = UA Reflex) RESULTS TO FOLLOW                           



[facility] [id number] 14:47:00





             Test Item    Value        Reference Range Interpretation Comments

 

             U Creat mg/dL (test code = U Creat 114                       

       



             mg/dL)                                              



[facility] [id number] 14:47:00





             Test Item    Value        Reference Range Interpretation Comments

 

             U Alb (test code = U Alb) 16.7                                   



[place] [address]-08-06 14:47:00





             Test Item    Value        Reference Range Interpretation Comments

 

             U Alb/Crea (test code = U Alb/Crea) 146                            

        



[place] [address]-08-06 14:47:00





             Test Item    Value        Reference Range Interpretation Comments

 

             Vitamin D, 25-OH, Total (test code = 37                      

         



             Vitamin D, 25-OH, Total)                                        



[place] [address]-08-06 14:47:00





             Test Item    Value        Reference Range Interpretation Comments

 

             U Creat mg/dL (test code = U Creat 114                       

       



             mg/dL)                                              



[place] 664290-08-06 14:47:00





             Test Item    Value        Reference Range Interpretation Comments

 

             U Prot/Creat (test code = U Prot/Creat) 430                  

            



[place] [address]-08-06 14:47:00





             Test Item    Value        Reference Range Interpretation Comments

 

             U Prot/Creat (test code = U 0.430 1      0.021-0.161               



             Prot/Creat)                                         



[place] 664290-08-06 14:47:00





             Test Item    Value        Reference Range Interpretation Comments

 

             U Protein (test code = U Protein) 49           5-24                

      



[place] 664290-08-06 14:47:00





             Test Item    Value        Reference Range Interpretation Comments

 

             Glucose Lvl (test code = Glucose Lvl) 103          65-99           

          



[place] 664290-08-06 14:47:00





             Test Item    Value        Reference Range Interpretation Comments

 

             BUN (test code = BUN) 24           7-25                      



[place] [address]-08-06 14:47:00





             Test Item    Value        Reference Range Interpretation Comments

 

             Creatinine Lvl (test code = Creatinine 1.32         0.50-0.99      

           



             Lvl)                                                



[place] 664290-08-06 14:47:00





             Test Item    Value        Reference Range Interpretation Comments

 

             eGFR NON-AFR. AMERICAN (test code = 43                             

        



             eGFR NON-AFR. AMERICAN)                                        



[place] 664290-08-06 14:47:00





             Test Item    Value        Reference Range Interpretation Comments

 

             eGFR  (test code = eGFR 50                         

            



             )                                        



[place] 664290-08-06 14:47:00





             Test Item    Value        Reference Range Interpretation Comments

 

             B/C Ratio (test code = B/C Ratio) 18           6-22                

      



[place] 664290-08-06 14:47:00





             Test Item    Value        Reference Range Interpretation Comments

 

             Sodium Lvl (test code = Sodium Lvl) 138          135-146           

        



[place] 664290-08-06 14:47:00





             Test Item    Value        Reference Range Interpretation Comments

 

             Potassium Lvl (test code = Potassium 4.4          3.5-5.3          

         



             Lvl)                                                



[place] 664290-08-06 14:47:00





             Test Item    Value        Reference Range Interpretation Comments

 

             Chloride Lvl (test code = Chloride Lvl) 102                  

            



[place] 664290-08-06 14:47:00





             Test Item    Value        Reference Range Interpretation Comments

 

             CO2 (test code = CO2) 30           20-32                     



[place] 664290-08-06 14:47:00





             Test Item    Value        Reference Range Interpretation Comments

 

             Calcium Lvl (test code = Calcium Lvl) 9.4          8.6-10.4        

          



[place] 664290-08-06 14:47:00





             Test Item    Value        Reference Range Interpretation Comments

 

             Phosphorus (test code = Phosphorus) 4.8          2.5-4.5           

        



[place] 664290-08-06 14:47:00





             Test Item    Value        Reference Range Interpretation Comments

 

             Albumin Lvl (test code = Albumin Lvl) 3.9          3.6-5.1         

          



[place] [address]-08-06 14:47:00





             Test Item    Value        Reference Range Interpretation Comments

 

             Plt Count Estimated (test code = DECREASED                         

     



             Plt Count Estimated)                                        



[place] 733810-08-06 14:47:00





             Test Item    Value        Reference Range Interpretation Comments

 

             WBC X 10x3 (test code = WBC X 10x3) 7.2          3.8-10.8          

        



[place] [address]-08-06 14:47:00





             Test Item    Value        Reference Range Interpretation Comments

 

             RBC X 10x6 (test code = RBC X 10x6) 3.71         3.80-5.10         

        



[place] [address]-08-06 14:47:00





             Test Item    Value        Reference Range Interpretation Comments

 

             Hgb (test code = Hgb) 10.7         11.7-15.5                 



[place] [address]-08-06 14:47:00





             Test Item    Value        Reference Range Interpretation Comments

 

             Hct (test code = Hct) 33.9         35.0-45.0                 



[facility]igubkfIOZKIDAMMZ9348[facility]77-95 14[facility]47:00





             Test Item    Value        Reference Range Interpretation Comments

 

             MCV (test code = MCV) 91.4         80.0-100.0                



[facility] CenterCqreilfZVJPSCEMTD2502-58-31 14:47:00





             Test Item    Value        Reference Range Interpretation Comments

 

             MCH (test code = MCH) 28.8 pg      27.0-33.0                 



[facility] CenterWkphzwoJGDGADLVUV1552-38-73 14:47:00





             Test Item    Value        Reference Range Interpretation Comments

 

             MCHC (test code = MCHC) 31.6         32.0-36.0                 



[facility] CenterArcvckdQNNRIJGMAO3240-53-16 14:47:00





             Test Item    Value        Reference Range Interpretation Comments

 

             RDW (test code = RDW) 13.9         11.0-15.0                 



[facility] CenterPnhkzgaPGMMWQRMRM1692-52-28 14:47:00





             Test Item    Value        Reference Range Interpretation Comments

 

             Platelet (test code = Platelet) 110          140-400               

    



[facility] CenterLtwjfrrMPMVZUAJJR5515-51-41 14:47:00





             Test Item    Value        Reference Range Interpretation Comments

 

             MPV (test code = MPV) 11.7         7.5-12.5                  



[facility] CenterOokkucaBZUAXNXKYI1137-50-40 14:47:00





             Test Item    Value        Reference Range Interpretation Comments

 

             Neutrophils # (test code = Neutrophils 5414         9736-5122      

           



             #)                                                  



[facility] CenterZnqqexpTZALVVSORF5455-22-54 14:47:00





             Test Item    Value        Reference Range Interpretation Comments

 

             Lymphocytes # (test code = Lymphocytes 1152         850-3900       

           



             #)                                                  



[facility] CenterWwdiwanGOCEGQZHBD1700-97-70 14:47:00





             Test Item    Value        Reference Range Interpretation Comments

 

             Monocytes # (test code = Monocytes #) 461          200-950         

          



[facility] CenterDwccmkeJGEQBXDUSE9558-28-74 14:47:00





             Test Item    Value        Reference Range Interpretation Comments

 

             Eosinophils # (test code = Eosinophils 122                   

           



             #)                                                  



[facility] CenterZpkmhgwYFSSAYQDXU7041-54-77 14:47:00





             Test Item    Value        Reference Range Interpretation Comments

 

             Basophils # (test code 50           See_Comment                [Aut

omated message] The



             = Basophils #)                                        system which 

generated



                                                                 this result tra

nsmitted



                                                                 reference range

: <=200.



                                                                 The reference r

cheyenne was



                                                                 not used to int

erpret



                                                                 this result as



                                                                 normal/abnormal

.



[facility] CenterMxquusbLIRVVQBIVA0057-23-45 14:47:00





             Test Item    Value        Reference Range Interpretation Comments

 

             Segs (test code = Segs) 75.2                                   



[facility]UK[facility]Y3810[facility]





             Test Item    Value        Reference Range Interpretation Comments

 

             Lymphocytes (test code = Lymphocytes) 16.0                         

          



[facility]MommvxzYRIOTGJCNQ4566-31-45 14:47:00





             Test Item    Value        Reference Range Interpretation Comments

 

             Monocytes (test code = Monocytes) 6.4                              

      



[facility] HospitalElqyyueSMUUSLMGSX5914-75-45 14:47:00





             Test Item    Value        Reference Range Interpretation Comments

 

             Eosinophils (test code = Eosinophils) 1.7                          

          



[facility] HospitalPhgievlXIIFASKISY5617-36-11 14:47:00





             Test Item    Value        Reference Range Interpretation Comments

 

             Basophils (test code = Basophils) 0.7                              

      



[facility] [id number] 14:47:00





             Test Item    Value        Reference Range Interpretation Comments

 

             Result 2 (Urine Culture) See Result Comment                        

   



             (test code = Result 2                                        



             (Urine Culture))                                        



[facility] AND [id number] 14:47:00





             Test Item    Value        Reference Range Interpretation Comments

 

             UA Color (test code = UA Color) YELLOW                             

    



[facility] AND [id number] 14:47:00





             Test Item    Value        Reference Range Interpretation Comments

 

             UA Turbidity (test code = UA CLOUDY                                

 



             Turbidity)                                          



[facility] AND [id number] 14:47:00





             Test Item    Value        Reference Range Interpretation Comments

 

             UA Spec Grav (test code = UA Spec 1.017 1      1.001-1.035         

      



             Grav)                                               



[facility] AND [id number] 14:47:00





             Test Item    Value        Reference Range Interpretation Comments

 

             UA pH (test code = UA pH) 5.5 1        5.0-8.0                   



[facility] AND [id number] 14:47:00





             Test Item    Value        Reference Range Interpretation Comments

 

             UA Glucose (test code = UA Glucose) NEGATIVE                       

        



[facility] AND [id number] 14:47:00





             Test Item    Value        Reference Range Interpretation Comments

 

             UA Bili (test code = UA Bili) NEGATIVE                             

  



[facility] AND [id number] 14:47:00





             Test Item    Value        Reference Range Interpretation Comments

 

             UA Ketones (test code = UA Ketones) NEGATIVE                       

        



[facility] [id number] 14:47:00





             Test Item    Value        Reference Range Interpretation Comments

 

             UA Blood (test code = UA Blood) 1+                                 

    



[facility] AND [id number] 14:47:00





             Test Item    Value        Reference Range Interpretation Comments

 

             UA Protein (test code = UA Protein) 1+                             

        



[facility] [id number] 14:47:00





             Test Item    Value        Reference Range Interpretation Comments

 

             UA Nitrite (test code = UA Nitrite) POSITIVE                       

        



[facility] AND [id number] 14:47:00





             Test Item    Value        Reference Range Interpretation Comments

 

             UA Leuk Est (test code = UA Leuk Est) 2+                           

          



[facility] [id number] 14:47:00





             Test Item    Value        Reference Range Interpretation Comments

 

             UA WBC (test code = UA WBC) > OR = 60                              



[facility] [id number] 14:47:00





             Test Item    Value        Reference Range Interpretation Comments

 

             UA RBC (test code = UA RBC) 0-2                                    



[facility] [id number] 14:47:00





             Test Item    Value        Reference Range Interpretation Comments

 

             UA Sq Epi (test code = UA Sq Epi) NONE SEEN                        

      



[facility] AND [id number] 14:47:00





             Test Item    Value        Reference Range Interpretation Comments

 

             UA Bacteria (test code = UA Bacteria) MANY                         

          



[facility] AND [id number] 14:47:00





             Test Item    Value        Reference Range Interpretation Comments

 

             UA Hyal Cast (test code = UA Hyal NONE SEEN                        

      



             Cast)                                               



[facility] AND [id number] 14:47:00





             Test Item    Value        Reference Range Interpretation Comments

 

             UA Reflex (test code CULTURE INDICATED -                           



             = UA Reflex) RESULTS TO FOLLOW                           



[facility]2020-08-06 14:47:00





             Test Item    Value        Reference Range Interpretation Comments

 

             U Creat mg/dL (test code = U Creat 114                       

       



             mg/dL)                                              



[facility] [id number] 14:47:00





             Test Item    Value        Reference Range Interpretation Comments

 

             U Alb (test code = U Alb) 16.7                                   



[facility] [id number] 14:47:00





             Test Item    Value        Reference Range Interpretation Comments

 

             U Alb/Crea (test code = U Alb/Crea) 146                            

        



[facility] [id number] 14:47:00





             Test Item    Value        Reference Range Interpretation Comments

 

             Vitamin D, 25-OH, Total (test code = 37                      

         



             Vitamin D, 25-OH, Total)                                        



[facility] [id number] 14:47:00





             Test Item    Value        Reference Range Interpretation Comments

 

             U Creat mg/dL (test code = U Creat 114                       

       



             mg/dL)                                              



[place] 664290-08-06 14:47:00





             Test Item    Value        Reference Range Interpretation Comments

 

             U Prot/Creat (test code = U Prot/Creat) 430                  

            



[place] 664290-08-06 14:47:00





             Test Item    Value        Reference Range Interpretation Comments

 

             U Prot/Creat (test code = U 0.430 1      0.021-0.161               



             Prot/Creat)                                         



[facility]2020-08-06 14:47:00





             Test Item    Value        Reference Range Interpretation Comments

 

             U Protein (test code = U Protein) 49           5-24                

      



[place] 664290-08-06 14:47:00





             Test Item    Value        Reference Range Interpretation Comments

 

             Glucose Lvl (test code = Glucose Lvl) 103          65-99           

          



[place] 664290-08-06 14:47:00





             Test Item    Value        Reference Range Interpretation Comments

 

             BUN (test code = BUN) 24           7-25                      



[place] 664290-08-06 14:47:00





             Test Item    Value        Reference Range Interpretation Comments

 

             Creatinine Lvl (test code = Creatinine 1.32         0.50-0.99      

           



             Lvl)                                                



[facility]2020-08-06 14:47:00





             Test Item    Value        Reference Range Interpretation Comments

 

             eGFR NON-AFR. AMERICAN (test code = 43                             

        



             eGFR NON-AFR. AMERICAN)                                        



[place] 664290-08-06 14:47:00





             Test Item    Value        Reference Range Interpretation Comments

 

             eGFR  (test code = eGFR 50                         

            



             )                                        



[place] 664290-08-06 14:47:00





             Test Item    Value        Reference Range Interpretation Comments

 

             B/C Ratio (test code = B/C Ratio) 18           6-22                

      



[place] 664290-08-06 14:47:00





             Test Item    Value        Reference Range Interpretation Comments

 

             Sodium Lvl (test code = Sodium Lvl) 138          135-146           

        



[place] 664290-08-06 14:47:00





             Test Item    Value        Reference Range Interpretation Comments

 

             Potassium Lvl (test code = Potassium 4.4          3.5-5.3          

         



             Lvl)                                                



[place] 664290-08-06 14:47:00





             Test Item    Value        Reference Range Interpretation Comments

 

             Chloride Lvl (test code = Chloride Lvl) 102                  

            



[facility]2020-08-06 14:47:00





             Test Item    Value        Reference Range Interpretation Comments

 

             CO2 (test code = CO2) 30           20-32                     



[facility]2020-08-06 14:47:00





             Test Item    Value        Reference Range Interpretation Comments

 

             Calcium Lvl (test code = Calcium Lvl) 9.4          8.6-10.4        

          



[place] 664290-08-06 14:47:00





             Test Item    Value        Reference Range Interpretation Comments

 

             Phosphorus (test code = Phosphorus) 4.8          2.5-4.5           

        



[facility]2020-08-06 14:47:00





             Test Item    Value        Reference Range Interpretation Comments

 

             Albumin Lvl (test code = Albumin Lvl) 3.9          3.6-5.1         

          



[place] [address]-08-06 14:47:00





             Test Item    Value        Reference Range Interpretation Comments

 

             Plt Count Estimated (test code = DECREASED                         

     



             Plt Count Estimated)                                        



[facility] [id number] 14:47:00





             Test Item    Value        Reference Range Interpretation Comments

 

             WBC X 10x3 (test code = WBC X 10x3) 7.2          3.8-10.8          

        



[place] [address]-08-06 14:47:00





             Test Item    Value        Reference Range Interpretation Comments

 

             RBC X 10x6 (test code = RBC X 10x6) 3.71         3.80-5.10         

        



[place] [address]-08-06 14:47:00





             Test Item    Value        Reference Range Interpretation Comments

 

             Hgb (test code = Hgb) 10.7         11.7-15.5                 



[place] [address]-08-06 14:47:00





             Test Item    Value        Reference Range Interpretation Comments

 

             Hct (test code = Hct) 33.9         35.0-45.0                 



[place] [address]-08-06 14:47:00





             Test Item    Value        Reference Range Interpretation Comments

 

             MCV (test code = MCV) 91.4         80.0-100.0                



[place] [address]-08-06 14:47:00





             Test Item    Value        Reference Range Interpretation Comments

 

             MCH (test code = MCH) 28.8 pg      27.0-33.0                 



[place] [address]-08-06 14:47:00





             Test Item    Value        Reference Range Interpretation Comments

 

             MCHC (test code = MCHC) 31.6         32.0-36.0                 



[place] 733810-08-06 14:47:00





             Test Item    Value        Reference Range Interpretation Comments

 

             RDW (test code = RDW) 13.9         11.0-15.0                 



[place] 733810-08-06 14:47:00





             Test Item    Value        Reference Range Interpretation Comments

 

             Platelet (test code = Platelet) 110          140-400               

    



[facility] CenterKvqwwtbMMOTYXBDXP6043-78-03 14:47:00





             Test Item    Value        Reference Range Interpretation Comments

 

             MPV (test code = MPV) 11.7         7.5-12.5                  



[facility] CenterCcdwinmVTQCFHKLNW2696-90-49 14:47:00





             Test Item    Value        Reference Range Interpretation Comments

 

             Neutrophils # (test code = Neutrophils 5414         7572-0717      

           



             #)                                                  



[facility] CenterCfysacyQTJUJCJLVH4837-91-65 14:47:00





             Test Item    Value        Reference Range Interpretation Comments

 

             Lymphocytes # (test code = Lymphocytes 1152         850-3900       

           



             #)                                                  



[facility] CenterUowapcqSYTVAFFNUA8093-25-61 14:47:00





             Test Item    Value        Reference Range Interpretation Comments

 

             Monocytes # (test code = Monocytes #) 461          200-950         

          



[facility] CenterStvcqowHMPLUPEUKE7071-47-64 14:47:00





             Test Item    Value        Reference Range Interpretation Comments

 

             Eosinophils # (test code = Eosinophils 122                   

           



             #)                                                  



[facility] CenterYzchwcpJIMVPQUTBF0923-68-92 14:47:00





             Test Item    Value        Reference Range Interpretation Comments

 

             Basophils # (test code 50           See_Comment                [Aut

omated message] The



             = Basophils #)                                        system which 

generated



                                                                 this result tra

nsmitted



                                                                 reference range

: <=200.



                                                                 The reference r

cheyenne was



                                                                 not used to int

erpret



                                                                 this result as



                                                                 normal/abnormal

.



[facility]GnfjjlxNQMLOREKAN6506-35-10 14:47:00





             Test Item    Value        Reference Range Interpretation Comments

 

             Segs (test code = Segs) 75.2                                   



[facility] CenterMeesotePVDMNWYICO0949-08-40 14:47:00





             Test Item    Value        Reference Range Interpretation Comments

 

             Lymphocytes (test code = Lymphocytes) 16.0                         

          



[place] 733810-08-06 14:47:00





             Test Item    Value        Reference Range Interpretation Comments

 

             Monocytes (test code = Monocytes) 6.4                              

      



[place] 733810-08-06 14:47:00





             Test Item    Value        Reference Range Interpretation Comments

 

             Eosinophils (test code = Eosinophils) 1.7                          

          



[facility] [id number] 14:47:00





             Test Item    Value        Reference Range Interpretation Comments

 

             Basophils (test code = Basophils) 0.7                              

      



[facility] [id number] 14:47:00





             Test Item    Value        Reference Range Interpretation Comments

 

             Result 2 (Urine Culture) See Result Comment                        

   



             (test code = Result 2                                        



             (Urine Culture))                                        



[facility] [id number] 14:47:00





             Test Item    Value        Reference Range Interpretation Comments

 

             UA Color (test code = UA Color) YELLOW                             

    



[facility] [id number] 14:47:00





             Test Item    Value        Reference Range Interpretation Comments

 

             UA Turbidity (test code = UA CLOUDY                                

 



             Turbidity)                                          



[facility] [id number] 14:47:00





             Test Item    Value        Reference Range Interpretation Comments

 

             UA Spec Grav (test code = UA Spec 1.017 1      1.001-1.035         

      



             Grav)                                               



[facility] AND [id number] 14:47:00





             Test Item    Value        Reference Range Interpretation Comments

 

             UA pH (test code = UA pH) 5.5 1        5.0-8.0                   



[facility] AND [id number] 14:47:00





             Test Item    Value        Reference Range Interpretation Comments

 

             UA Glucose (test code = UA Glucose) NEGATIVE                       

        



[facility] [id number] 14:47:00





             Test Item    Value        Reference Range Interpretation Comments

 

             UA Bili (test code = UA Bili) NEGATIVE                             

  



[facility] [id number] 14:47:00





             Test Item    Value        Reference Range Interpretation Comments

 

             UA Ketones (test code = UA Ketones) NEGATIVE                       

        



[facility] [id number] 14:47:00





             Test Item    Value        Reference Range Interpretation Comments

 

             UA Blood (test code = UA Blood) 1+                                 

    



[facility] [id number] 14:47:00





             Test Item    Value        Reference Range Interpretation Comments

 

             UA Protein (test code = UA Protein) 1+                             

        



[facility] [id number] 14:47:00





             Test Item    Value        Reference Range Interpretation Comments

 

             UA Nitrite (test code = UA Nitrite) POSITIVE                       

        



[facility] [id number] 14:47:00





             Test Item    Value        Reference Range Interpretation Comments

 

             UA Leuk Est (test code = UA Leuk Est) 2+                           

          



[facility] [id number] 14:47:00





             Test Item    Value        Reference Range Interpretation Comments

 

             UA WBC (test code = UA WBC) > OR = 60                              



[facility] AND [id number] 14:47:00





             Test Item    Value        Reference Range Interpretation Comments

 

             UA RBC (test code = UA RBC) 0-2                                    



[facility] [id number] 14:47:00





             Test Item    Value        Reference Range Interpretation Comments

 

             UA Sq Epi (test code = UA Sq Epi) NONE SEEN                        

      



[facility] [id number] 14:47:00





             Test Item    Value        Reference Range Interpretation Comments

 

             UA Bacteria (test code = UA Bacteria) MANY                         

          



[facility] [id number] 14:47:00





             Test Item    Value        Reference Range Interpretation Comments

 

             UA Hyal Cast (test code = UA Hyal NONE SEEN                        

      



             Cast)                                               



[facility] AND [id number] 14:47:00





             Test Item    Value        Reference Range Interpretation Comments

 

             UA Reflex (test code CULTURE INDICATED -                           



             = UA Reflex) RESULTS TO FOLLOW                           



[facility]2020-08-06 14:47:00





             Test Item    Value        Reference Range Interpretation Comments

 

             U Creat mg/dL (test code = U Creat 114                       

       



             mg/dL)                                              



[facility]2020-08-06 14:47:00





             Test Item    Value        Reference Range Interpretation Comments

 

             U Alb (test code = U Alb) 16.7                                   



[facility]2020-08-06 14:47:00





             Test Item    Value        Reference Range Interpretation Comments

 

             U Alb/Crea (test code = U Alb/Crea) 146                            

        



[facility]2020-08-06 14:47:00





             Test Item    Value        Reference Range Interpretation Comments

 

             Vitamin D, 25-OH, Total (test code = 37                      

         



             Vitamin D, 25-OH, Total)                                        



[facility]2020-08-06 14:47:00





             Test Item    Value        Reference Range Interpretation Comments

 

             U Creat mg/dL (test code = U Creat 114                       

       



             mg/dL)                                              



[facility]2020-08-06 14:47:00





             Test Item    Value        Reference Range Interpretation Comments

 

             U Prot/Creat (test code = U Prot/Creat) 430                  

            



[facility]2020-08-06 14:47:00





             Test Item    Value        Reference Range Interpretation Comments

 

             U Prot/Creat (test code = U 0.430 1      0.021-0.161               



             Prot/Creat)                                         



[facility]2020-08-06 14:47:00





             Test Item    Value        Reference Range Interpretation Comments

 

             U Protein (test code = U Protein) 49           5-24                

      



[place] 664290-08-06 14:47:00





             Test Item    Value        Reference Range Interpretation Comments

 

             Glucose Lvl (test code = Glucose Lvl) 103          65-99           

          



[place] 664290-08-06 14:47:00





             Test Item    Value        Reference Range Interpretation Comments

 

             BUN (test code = BUN) 24           7-25                      



[place] 664290-08-06 14:47:00





             Test Item    Value        Reference Range Interpretation Comments

 

             Creatinine Lvl (test code = Creatinine 1.32         0.50-0.99      

           



             Lvl)                                                



[place] 664290-08-06 14:47:00





             Test Item    Value        Reference Range Interpretation Comments

 

             eGFR NON-AFR. AMERICAN (test code = 43                             

        



             eGFR NON-AFR. AMERICAN)                                        



[facility]2020-08-06 14:47:00





             Test Item    Value        Reference Range Interpretation Comments

 

             eGFR  (test code = eGFR 50                         

            



             )                                        



[place] 664290-08-06 14:47:00





             Test Item    Value        Reference Range Interpretation Comments

 

             B/C Ratio (test code = B/C Ratio) 18           6-22                

      



[place] 664290-08-06 14:47:00





             Test Item    Value        Reference Range Interpretation Comments

 

             Sodium Lvl (test code = Sodium Lvl) 138          135-146           

        



[facility]2020-08-06 14:47:00





             Test Item    Value        Reference Range Interpretation Comments

 

             Potassium Lvl (test code = Potassium 4.4          3.5-5.3          

         



             Lvl)                                                



[facility]2020-08-06 14:47:00





             Test Item    Value        Reference Range Interpretation Comments

 

             Chloride Lvl (test code = Chloride Lvl) 102                  

            



[place] 664290-08-06 14:47:00





             Test Item    Value        Reference Range Interpretation Comments

 

             CO2 (test code = CO2) 30           20-32                     



[place] 664290-08-06 14:47:00





             Test Item    Value        Reference Range Interpretation Comments

 

             Calcium Lvl (test code = Calcium Lvl) 9.4          8.6-10.4        

          



[place] 664290-08-06 14:47:00





             Test Item    Value        Reference Range Interpretation Comments

 

             Phosphorus (test code = Phosphorus) 4.8          2.5-4.5           

        



[place] [address]-08-06 14:47:00





             Test Item    Value        Reference Range Interpretation Comments

 

             Albumin Lvl (test code = Albumin Lvl) 3.9          3.6-5.1         

          



[place] 733810-08-06 14:47:00





             Test Item    Value        Reference Range Interpretation Comments

 

             Plt Count Estimated (test code = DECREASED                         

     



             Plt Count Estimated)                                        



[facility] CenterZknevobTVRQBEYWVI8089-84-77 14:47:00





             Test Item    Value        Reference Range Interpretation Comments

 

             WBC X 10x3 (test code = WBC X 10x3) 7.2          3.8-10.8          

        



[facility] CenterLbqsuniGGZHCDDWNE5252-86-31 14:47:00





             Test Item    Value        Reference Range Interpretation Comments

 

             RBC X 10x6 (test code = RBC X 10x6) 3.71         3.80-5.10         

        



[place] 733810-08-06 14:47:00





             Test Item    Value        Reference Range Interpretation Comments

 

             Hgb (test code = Hgb) 10.7         11.7-15.5                 



[place] 733810-08-06 14:47:00





             Test Item    Value        Reference Range Interpretation Comments

 

             Hct (test code = Hct) 33.9         35.0-45.0                 



[place] 733810-08-06 14:47:00





             Test Item    Value        Reference Range Interpretation Comments

 

             MCV (test code = MCV) 91.4         80.0-100.0                



[place] 733810-08-06 14:47:00





             Test Item    Value        Reference Range Interpretation Comments

 

             MCH (test code = MCH) 28.8 pg      27.0-33.0                 



[facility] CenterLjpfcgqCBOWAKXGAX5387-58-34 14:47:00





             Test Item    Value        Reference Range Interpretation Comments

 

             MCHC (test code = MCHC) 31.6         32.0-36.0                 



[place] 733810-08-06 14:47:00





             Test Item    Value        Reference Range Interpretation Comments

 

             RDW (test code = RDW) 13.9         11.0-15.0                 



[place] 733810-08-06 14:47:00





             Test Item    Value        Reference Range Interpretation Comments

 

             Platelet (test code = Platelet) 110          140-400               

    



[facility] CenterJdgtxrtGVYVRSLEVW4018-97-99 14:47:00





             Test Item    Value        Reference Range Interpretation Comments

 

             MPV (test code = MPV) 11.7         7.5-12.5                  



[facility] CenterGqugptiDYMJWCBHNM2939-30-44 14:47:00





             Test Item    Value        Reference Range Interpretation Comments

 

             Neutrophils # (test code = Neutrophils 5414         3940-1211      

           



             #)                                                  



[facility] CenterOxfogloMIFNKQUKHF9356-02-08 14:47:00





             Test Item    Value        Reference Range Interpretation Comments

 

             Lymphocytes # (test code = Lymphocytes 1152         850-3900       

           



             #)                                                  



[facility] CenterEmptlkcHZWEUEBKMA8646-29-61 14:47:00





             Test Item    Value        Reference Range Interpretation Comments

 

             Monocytes # (test code = Monocytes #) 461          200-950         

          



[facility] CenterHfitsukUGTABGPHQY7440-00-03 14:47:00





             Test Item    Value        Reference Range Interpretation Comments

 

             Eosinophils # (test code = Eosinophils 122                   

           



             #)                                                  



[facility] HospitalMicafzlCMSTMUFTOC2172-19-49 14:47:00





             Test Item    Value        Reference Range Interpretation Comments

 

             Basophils # (test code 50           See_Comment                [Aut

omated message] The



             = Basophils #)                                        system which 

generated



                                                                 this result tra

nsmitted



                                                                 reference range

: <=200.



                                                                 The reference r

cheyenne was



                                                                 not used to int

erpret



                                                                 this result as



                                                                 normal/abnormal

.



[facility] HospitalNtqamjbGDVCLZYUCN1350-03-71 14:47:00





             Test Item    Value        Reference Range Interpretation Comments

 

             Segs (test code = Segs) 75.2                                   



[facility]QVXMNJRXM3933[facility] 14:47:00





             Test Item    Value        Reference Range Interpretation Comments

 

             Lymphocytes (test code = Lymphocytes) 16.0                         

          



[facility] HospitalHwcsznwLRQGQHJWJA7462-41-81 14:47:00





             Test Item    Value        Reference Range Interpretation Comments

 

             Monocytes (test code = Monocytes) 6.4                              

      



[facility] HospitalJdfeeruLQDMTNYCGI7554-89-47 14:47:00





             Test Item    Value        Reference Range Interpretation Comments

 

             Eosinophils (test code = Eosinophils) 1.7                          

          



[facility] CenterQveckreQLAIWXRENL2739-55-89 14:47:00





             Test Item    Value        Reference Range Interpretation Comments

 

             Basophils (test code = Basophils) 0.7                              

      



[facility]annREF[facility] [id number] 14:47:00





             Test Item    Value        Reference Range Interpretation Comments

 

             Result 2 (Urine Culture) See Result Comment                        

   



             (test code = Result 2                                        



             (Urine Culture))                                        



[facility] [id number] 14:47:00





             Test Item    Value        Reference Range Interpretation Comments

 

             UA Color (test code = UA Color) YELLOW                             

    



[facility] [id number] 14:47:00





             Test Item    Value        Reference Range Interpretation Comments

 

             UA Turbidity (test code = UA CLOUDY                                

 



             Turbidity)                                          



[facility] [id number] 14:47:00





             Test Item    Value        Reference Range Interpretation Comments

 

             UA Spec Grav (test code = UA Spec 1.017 1      1.001-1.035         

      



             Grav)                                               



[facility] [id number] 14:47:00





             Test Item    Value        Reference Range Interpretation Comments

 

             UA pH (test code = UA pH) 5.5 1        5.0-8.0                   



[facility] [id number] 14:47:00





             Test Item    Value        Reference Range Interpretation Comments

 

             UA Glucose (test code = UA Glucose) NEGATIVE                       

        



[facility] [id number] 14:47:00





             Test Item    Value        Reference Range Interpretation Comments

 

             UA Bili (test code = UA Bili) NEGATIVE                             

  



[facility] [id number] 14:47:00





             Test Item    Value        Reference Range Interpretation Comments

 

             UA Ketones (test code = UA Ketones) NEGATIVE                       

        



[facility] [id number] 14:47:00





             Test Item    Value        Reference Range Interpretation Comments

 

             UA Blood (test code = UA Blood) 1+                                 

    



[facility] [id number] 14:47:00





             Test Item    Value        Reference Range Interpretation Comments

 

             UA Protein (test code = UA Protein) 1+                             

        



[facility] [id number] 14:47:00





             Test Item    Value        Reference Range Interpretation Comments

 

             UA Nitrite (test code = UA Nitrite) POSITIVE                       

        



[facility] [id number] 14:47:00





             Test Item    Value        Reference Range Interpretation Comments

 

             UA Leuk Est (test code = UA Leuk Est) 2+                           

          



[facility] [id number] 14:47:00





             Test Item    Value        Reference Range Interpretation Comments

 

             UA WBC (test code = UA WBC) > OR = 60                              



[facility] [id number] 14:47:00





             Test Item    Value        Reference Range Interpretation Comments

 

             UA RBC (test code = UA RBC) 0-2                                    



[facility] [id number] 14:47:00





             Test Item    Value        Reference Range Interpretation Comments

 

             UA Sq Epi (test code = UA Sq Epi) NONE SEEN                        

      



[facility] [id number] 14:47:00





             Test Item    Value        Reference Range Interpretation Comments

 

             UA Bacteria (test code = UA Bacteria) MANY                         

          



[facility] [id number] 14:47:00





             Test Item    Value        Reference Range Interpretation Comments

 

             UA Hyal Cast (test code = UA Hyal NONE SEEN                        

      



             Cast)                                               



[facility] AND [id number] 14:47:00





             Test Item    Value        Reference Range Interpretation Comments

 

             UA Reflex (test code CULTURE INDICATED -                           



             = UA Reflex) RESULTS TO FOLLOW                           



[facility]2020-08-06 14:47:00





             Test Item    Value        Reference Range Interpretation Comments

 

             U Creat mg/dL (test code = U Creat 114                       

       



             mg/dL)                                              



[facility]2020-08-06 14:47:00





             Test Item    Value        Reference Range Interpretation Comments

 

             U Alb (test code = U Alb) 16.7                                   



[place] 017650-08-06 14:47:00





             Test Item    Value        Reference Range Interpretation Comments

 

             U Alb/Crea (test code = U Alb/Crea) 146                            

        



[facility]2020-08-06 14:47:00





             Test Item    Value        Reference Range Interpretation Comments

 

             Vitamin D, 25-OH, Total (test code = 37                      

         



             Vitamin D, 25-OH, Total)                                        



[facility]2020-08-06 14:47:00





             Test Item    Value        Reference Range Interpretation Comments

 

             U Creat mg/dL (test code = U Creat 114                       

       



             mg/dL)                                              



[place] 664290-08-06 14:47:00





             Test Item    Value        Reference Range Interpretation Comments

 

             U Prot/Creat (test code = U Prot/Creat) 430                  

            



[place] 664290-08-06 14:47:00





             Test Item    Value        Reference Range Interpretation Comments

 

             U Prot/Creat (test code = U 0.430 1      0.021-0.161               



             Prot/Creat)                                         



[place] 664290-08-06 14:47:00





             Test Item    Value        Reference Range Interpretation Comments

 

             U Protein (test code = U Protein) 49           5-24                

      



[place] 664290-08-06 14:47:00





             Test Item    Value        Reference Range Interpretation Comments

 

             Glucose Lvl (test code = Glucose Lvl) 103          65-99           

          



[place] 664290-08-06 14:47:00





             Test Item    Value        Reference Range Interpretation Comments

 

             BUN (test code = BUN) 24           7-25                      



[place] 664290-08-06 14:47:00





             Test Item    Value        Reference Range Interpretation Comments

 

             Creatinine Lvl (test code = Creatinine 1.32         0.50-0.99      

           



             Lvl)                                                



[place] 664290-08-06 14:47:00





             Test Item    Value        Reference Range Interpretation Comments

 

             eGFR NON-AFR. AMERICAN (test code = 43                             

        



             eGFR NON-AFR. AMERICAN)                                        



[place] 664290-08-06 14:47:00





             Test Item    Value        Reference Range Interpretation Comments

 

             eGFR  (test code = eGFR 50                         

            



             )                                        



[place] 664290-08-06 14:47:00





             Test Item    Value        Reference Range Interpretation Comments

 

             B/C Ratio (test code = B/C Ratio) 18           6-22                

      



[place] 664290-08-06 14:47:00





             Test Item    Value        Reference Range Interpretation Comments

 

             Sodium Lvl (test code = Sodium Lvl) 138          135-146           

        



[place] 664290-08-06 14:47:00





             Test Item    Value        Reference Range Interpretation Comments

 

             Potassium Lvl (test code = Potassium 4.4          3.5-5.3          

         



             Lvl)                                                



[place] 664290-08-06 14:47:00





             Test Item    Value        Reference Range Interpretation Comments

 

             Chloride Lvl (test code = Chloride Lvl) 102                  

            



[place] 664290-08-06 14:47:00





             Test Item    Value        Reference Range Interpretation Comments

 

             CO2 (test code = CO2) 30           20-32                     



[place] 664290-08-06 14:47:00





             Test Item    Value        Reference Range Interpretation Comments

 

             Calcium Lvl (test code = Calcium Lvl) 9.4          8.6-10.4        

          



[place] 664290-08-06 14:47:00





             Test Item    Value        Reference Range Interpretation Comments

 

             Phosphorus (test code = Phosphorus) 4.8          2.5-4.5           

        



[place] 664290-08-06 14:47:00





             Test Item    Value        Reference Range Interpretation Comments

 

             Albumin Lvl (test code = Albumin Lvl) 3.9          3.6-5.1         

          



[place] [address]-08-06 14:47:00





             Test Item    Value        Reference Range Interpretation Comments

 

             Plt Count Estimated (test code = DECREASED                         

     



             Plt Count Estimated)                                        



[place] 733810-08-06 14:47:00





             Test Item    Value        Reference Range Interpretation Comments

 

             WBC X 10x3 (test code = WBC X 10x3) 7.2          3.8-10.8          

        



[place] [address]-08-06 14:47:00





             Test Item    Value        Reference Range Interpretation Comments

 

             RBC X 10x6 (test code = RBC X 10x6) 3.71         3.80-5.10         

        



[facility]TyqzgmrFYRCIAWPJH3805-29-64 14[facility]47:00





             Test Item    Value        Reference Range Interpretation Comments

 

             Hgb (test code = Hgb) 10.7         11.7-15.5                 



[facility] CenterGuyyuvoRBRKKURLTI0199-52-88 14:47:00





             Test Item    Value        Reference Range Interpretation Comments

 

             Hct (test code = Hct) 33.9         35.0-45.0                 



[facility] CenterEbkznzwTRTIZLSTBV6418-36-57 14:47:00





             Test Item    Value        Reference Range Interpretation Comments

 

             MCV (test code = MCV) 91.4         80.0-100.0                



[facility] CenterOxaafdnAKQQOQVKAG5160-05-95 14:47:00





             Test Item    Value        Reference Range Interpretation Comments

 

             MCH (test code = MCH) 28.8 pg      27.0-33.0                 



[facility] CenterDfjidjwPYSXDBFERR8997-82-72 14:47:00





             Test Item    Value        Reference Range Interpretation Comments

 

             MCHC (test code = MCHC) 31.6         32.0-36.0                 



[facility] CenterPnzycbiCPUHHHQMBM0923-28-46 14:47:00





             Test Item    Value        Reference Range Interpretation Comments

 

             RDW (test code = RDW) 13.9         11.0-15.0                 



[facility] CenterOfxwkxmNJONWVZWOO9143-72-18 14:47:00





             Test Item    Value        Reference Range Interpretation Comments

 

             Platelet (test code = Platelet) 110          140-400               

    



[facility] CenterMtvkiddNZAEGSZRJE9776-79-35 14:47:00





             Test Item    Value        Reference Range Interpretation Comments

 

             MPV (test code = MPV) 11.7         7.5-12.5                  



[facility] CenterUckihrrMPTLQLYRLN5516-92-35 14:47:00





             Test Item    Value        Reference Range Interpretation Comments

 

             Neutrophils # (test code = Neutrophils 5414         1308-0395      

           



             #)                                                  



[facility] CenterWuvcuuiLTHRGBEJTS4611-08-82 14:47:00





             Test Item    Value        Reference Range Interpretation Comments

 

             Lymphocytes # (test code = Lymphocytes 1152         850-3900       

           



             #)                                                  



[facility] CenterNquxfleIYACRKATOA8978-69-25 14:47:00





             Test Item    Value        Reference Range Interpretation Comments

 

             Monocytes # (test code = Monocytes #) 461          200-950         

          



[facility] CenterPggoubsKSPBRKRZIX1038-82-37 14:47:00





             Test Item    Value        Reference Range Interpretation Comments

 

             Eosinophils # (test code = Eosinophils 122                   

           



             #)                                                  



[facility] CenterDjsipaaAXIINMNHFS3544-68-19 14:47:00





             Test Item    Value        Reference Range Interpretation Comments

 

             Basophils # (test code 50           See_Comment                [Aut

omated message] The



             = Basophils #)                                        system which 

generated



                                                                 this result tra

nsmitted



                                                                 reference range

: <=200.



                                                                 The reference r

cheyenne was



                                                                 not used to int

erpret



                                                                 this result as



                                                                 normal/abnormal

.



[facility] CenterWpdosacZUPAYZSEAE6252-84-59 14:47:00





             Test Item    Value        Reference Range Interpretation Comments

 

             Segs (test code = Segs) 75.2                                   



[facility] CenterQmtansiSMHSXRCRYI3669-41-40 14:47:00





             Test Item    Value        Reference Range Interpretation Comments

 

             Lymphocytes (test code = Lymphocytes) 16.0                         

          



[facility] CenterXhketjnXACBBBAZTA0776-13-34 14:47:00





             Test Item    Value        Reference Range Interpretation Comments

 

             Monocytes (test code = Monocytes) 6.4                              

      



[facility]WxidbdrEKANSTXZTP3361-51-99 14:47:00





             Test Item    Value        Reference Range Interpretation Comments

 

             Eosinophils (test code = Eosinophils) 1.7                          

          



[facility] CenterLmxgpzpECQLJNIFFP7077-54-29 14:47:00





             Test Item    Value        Reference Range Interpretation Comments

 

             Basophils (test code = Basophils) 0.7                              

      



[facility] CenterannREFER[facility] LAB [id number] 14:47:00





             Test Item    Value        Reference Range Interpretation Comments

 

             Result 2 (Urine Culture) See Result Comment                        

   



             (test code = Result 2                                        



             (Urine Culture))                                        



[facility] AND [id number] 14:47:00





             Test Item    Value        Reference Range Interpretation Comments

 

             UA Color (test code = UA Color) YELLOW                             

    



[facility] AND [id number] 14:47:00





             Test Item    Value        Reference Range Interpretation Comments

 

             UA Turbidity (test code = UA CLOUDY                                

 



             Turbidity)                                          



[facility] AND [id number] 14:47:00





             Test Item    Value        Reference Range Interpretation Comments

 

             UA Spec Grav (test code = UA Spec 1.017 1      1.001-1.035         

      



             Grav)                                               



[facility] AND [id number] 14:47:00





             Test Item    Value        Reference Range Interpretation Comments

 

             UA pH (test code = UA pH) 5.5 1        5.0-8.0                   



[facility] [id number] 14:47:00





             Test Item    Value        Reference Range Interpretation Comments

 

             UA Glucose (test code = UA Glucose) NEGATIVE                       

        



[facility] AND [id number] 14:47:00





             Test Item    Value        Reference Range Interpretation Comments

 

             UA Bili (test code = UA Bili) NEGATIVE                             

  



[facility] [id number] 14:47:00





             Test Item    Value        Reference Range Interpretation Comments

 

             UA Ketones (test code = UA Ketones) NEGATIVE                       

        



[facility] [id number] 14:47:00





             Test Item    Value        Reference Range Interpretation Comments

 

             UA Blood (test code = UA Blood) 1+                                 

    



[facility] [id number] 14:47:00





             Test Item    Value        Reference Range Interpretation Comments

 

             UA Protein (test code = UA Protein) 1+                             

        



[facility] [id number] 14:47:00





             Test Item    Value        Reference Range Interpretation Comments

 

             UA Nitrite (test code = UA Nitrite) POSITIVE                       

        



[facility] [id number] 14:47:00





             Test Item    Value        Reference Range Interpretation Comments

 

             UA Leuk Est (test code = UA Leuk Est) 2+                           

          



[facility] [id number] 14:47:00





             Test Item    Value        Reference Range Interpretation Comments

 

             UA WBC (test code = UA WBC) > OR = 60                              



[facility] [id number] 14:47:00





             Test Item    Value        Reference Range Interpretation Comments

 

             UA RBC (test code = UA RBC) 0-2                                    



[facility] [id number] 14:47:00





             Test Item    Value        Reference Range Interpretation Comments

 

             UA Sq Epi (test code = UA Sq Epi) NONE SEEN                        

      



[facility] [id number] 14:47:00





             Test Item    Value        Reference Range Interpretation Comments

 

             UA Bacteria (test code = UA Bacteria) MANY                         

          



[facility] [id number] 14:47:00





             Test Item    Value        Reference Range Interpretation Comments

 

             UA Hyal Cast (test code = UA Hyal NONE SEEN                        

      



             Cast)                                               



[facility] [id number] 14:47:00





             Test Item    Value        Reference Range Interpretation Comments

 

             UA Reflex (test code CULTURE INDICATED -                           



             = UA Reflex) RESULTS TO FOLLOW                           



[facility] [id number] 14:47:00





             Test Item    Value        Reference Range Interpretation Comments

 

             U Creat mg/dL (test code = U Creat 114                       

       



             mg/dL)                                              



[facility] [id number] 14:47:00





             Test Item    Value        Reference Range Interpretation Comments

 

             U Alb (test code = U Alb) 16.7                                   



[facility]ann[facility] [id number] 14:47:00





             Test Item    Value        Reference Range Interpretation Comments

 

             U Alb/Crea (test code = U Alb/Crea) 146                            

        



[facility] [id number] 14:47:00





             Test Item    Value        Reference Range Interpretation Comments

 

             Vitamin D, 25-OH, Total (test code = 37                      

         



             Vitamin D, 25-OH, Total)                                        



[facility] [id number] 14:47:00





             Test Item    Value        Reference Range Interpretation Comments

 

             U Creat mg/dL (test code = U Creat 114                       

       



             mg/dL)                                              



[facility]2020-08-06 14:47:00





             Test Item    Value        Reference Range Interpretation Comments

 

             U Prot/Creat (test code = U Prot/Creat) 430                  

            



[facility]2020-08-06 14:47:00





             Test Item    Value        Reference Range Interpretation Comments

 

             U Prot/Creat (test code = U 0.430 1      0.021-0.161               



             Prot/Creat)                                         



[facility]2020-08-06 14:47:00





             Test Item    Value        Reference Range Interpretation Comments

 

             U Protein (test code = U Protein) 49           5-24                

      



[facility]2020-08-06 14:47:00





             Test Item    Value        Reference Range Interpretation Comments

 

             Glucose Lvl (test code = Glucose Lvl) 103          65-99           

          



[facility]2020-08-06 14:47:00





             Test Item    Value        Reference Range Interpretation Comments

 

             BUN (test code = BUN) 24           7-25                      



[facility]2020-08-06 14:47:00





             Test Item    Value        Reference Range Interpretation Comments

 

             Creatinine Lvl (test code = Creatinine 1.32         0.50-0.99      

           



             Lvl)                                                



[facility]2020-08-06 14:47:00





             Test Item    Value        Reference Range Interpretation Comments

 

             eGFR NON-AFR. AMERICAN (test code = 43                             

        



             eGFR NON-AFR. AMERICAN)                                        



[facility]2020-08-06 14:47:00





             Test Item    Value        Reference Range Interpretation Comments

 

             eGFR  (test code = eGFR 50                         

            



             )                                        



[facility]2020-08-06 14:47:00





             Test Item    Value        Reference Range Interpretation Comments

 

             B/C Ratio (test code = B/C Ratio) 18           6-22                

      



[place] [address]-08-06 14:47:00





             Test Item    Value        Reference Range Interpretation Comments

 

             Sodium Lvl (test code = Sodium Lvl) 138          135-146           

        



[place] 664290-08-06 14:47:00





             Test Item    Value        Reference Range Interpretation Comments

 

             Potassium Lvl (test code = Potassium 4.4          3.5-5.3          

         



             Lvl)                                                



[place] 664290-08-06 14:47:00





             Test Item    Value        Reference Range Interpretation Comments

 

             Chloride Lvl (test code = Chloride Lvl) 102                  

            



[place] 664290-08-06 14:47:00





             Test Item    Value        Reference Range Interpretation Comments

 

             CO2 (test code = CO2) 30           20-32                     



[place] 664290-08-06 14:47:00





             Test Item    Value        Reference Range Interpretation Comments

 

             Calcium Lvl (test code = Calcium Lvl) 9.4          8.6-10.4        

          



[place] [address]-08-06 14:47:00





             Test Item    Value        Reference Range Interpretation Comments

 

             Phosphorus (test code = Phosphorus) 4.8          2.5-4.5           

        



[place] 664290-08-06 14:47:00





             Test Item    Value        Reference Range Interpretation Comments

 

             Albumin Lvl (test code = Albumin Lvl) 3.9          3.6-5.1         

          



[place] 733810-08-06 14:47:00





             Test Item    Value        Reference Range Interpretation Comments

 

             Plt Count Estimated (test code = DECREASED                         

     



             Plt Count Estimated)                                        



[place] 733810-08-06 14:47:00





             Test Item    Value        Reference Range Interpretation Comments

 

             WBC X 10x3 (test code = WBC X 10x3) 7.2          3.8-10.8          

        



[place] [address]-08-06 14:47:00





             Test Item    Value        Reference Range Interpretation Comments

 

             RBC X 10x6 (test code = RBC X 10x6) 3.71         3.80-5.10         

        



[place] [address]-08-06 14:47:00





             Test Item    Value        Reference Range Interpretation Comments

 

             Hgb (test code = Hgb) 10.7         11.7-15.5                 



[place] [address]-08-06 14:47:00





             Test Item    Value        Reference Range Interpretation Comments

 

             Hct (test code = Hct) 33.9         35.0-45.0                 



[place] [address]-08-06 14:47:00





             Test Item    Value        Reference Range Interpretation Comments

 

             MCV (test code = MCV) 91.4         80.0-100.0                



[facility] [id number] 14:47:00





             Test Item    Value        Reference Range Interpretation Comments

 

             MCH (test code = MCH) 28.8 pg      27.0-33.0                 



[place] 733810-08-06 14:47:00





             Test Item    Value        Reference Range Interpretation Comments

 

             MCHC (test code = MCHC) 31.6         32.0-36.0                 



[facility] C[id number] 14:47:00





             Test Item    Value        Reference Range Interpretation Comments

 

             RDW (test code = RDW) 13.9         11.0-15.0                 



[place] 733810-08-06 14:47:00





             Test Item    Value        Reference Range Interpretation Comments

 

             Platelet (test code = Platelet) 110          140-400               

    



[facility] CenterUjdeowwMBWJWWTICR0247-40-04 14:47:00





             Test Item    Value        Reference Range Interpretation Comments

 

             MPV (test code = MPV) 11.7         7.5-12.5                  



[facility] CenterEgkbejhBGZJOLPWQJ8486-32-41 14:47:00





             Test Item    Value        Reference Range Interpretation Comments

 

             Neutrophils # (test code = Neutrophils 5414         3286-6233      

           



             #)                                                  



[facility] CenterKvwxhlcTTRXGXGEXF1669-81-97 14:47:00





             Test Item    Value        Reference Range Interpretation Comments

 

             Lymphocytes # (test code = Lymphocytes 1152         850-3900       

           



             #)                                                  



[facility] CenterYnrwsjdSWBBHPWJAI8830-04-43 14:47:00





             Test Item    Value        Reference Range Interpretation Comments

 

             Monocytes # (test code = Monocytes #) 461          200-950         

          



[facility] CenterKucglzzJOGINFWZPH0910-90-35 14:47:00





             Test Item    Value        Reference Range Interpretation Comments

 

             Eosinophils # (test code = Eosinophils 122                   

           



             #)                                                  



[place] 733810-08-06 14:47:00





             Test Item    Value        Reference Range Interpretation Comments

 

             Basophils # (test code 50           See_Comment                [Aut

omated message] The



             = Basophils #)                                        system which 

generated



                                                                 this result tra

nsmitted



                                                                 reference range

: <=200.



                                                                 The reference r

cheyenne was



                                                                 not used to int

erpret



                                                                 this result as



                                                                 normal/abnormal

.



[facility] CenterBxhnfyeEFVKJAIFNW8381-12-56 14:47:00





             Test Item    Value        Reference Range Interpretation Comments

 

             Segs (test code = Segs) 75.2                                   



[facility] CenterZrouqxyEVKCCWXTGK5026-13-68 14:47:00





             Test Item    Value        Reference Range Interpretation Comments

 

             Lymphocytes (test code = Lymphocytes) 16.0                         

          



[facility]AlnkzrsKQUXRIOOOV5409-73-85 14:47:00





             Test Item    Value        Reference Range Interpretation Comments

 

             Monocytes (test code = Monocytes) 6.4                              

      



[facility]TPJJDHVNVE8497[facility] 14:47:00





             Test Item    Value        Reference Range Interpretation Comments

 

             Eosinophils (test code = Eosinophils) 1.7                          

          



[facility] [id number] 14:47:00





             Test Item    Value        Reference Range Interpretation Comments

 

             Basophils (test code = Basophils) 0.7                              

      



[facility] [id number] 14:47:00





             Test Item    Value        Reference Range Interpretation Comments

 

             Result 2 (Urine Culture) See Result Comment                        

   



             (test code = Result 2                                        



             (Urine Culture))                                        



[facility] AND [id number] 14:47:00





             Test Item    Value        Reference Range Interpretation Comments

 

             UA Color (test code = UA Color) YELLOW                             

    



[facility] AND [id number] 14:47:00





             Test Item    Value        Reference Range Interpretation Comments

 

             UA Turbidity (test code = UA CLOUDY                                

 



             Turbidity)                                          



[facility] AND [id number] 14:47:00





             Test Item    Value        Reference Range Interpretation Comments

 

             UA Spec Grav (test code = UA Spec 1.017 1      1.001-1.035         

      



             Grav)                                               



[facility] AND [id number] 14:47:00





             Test Item    Value        Reference Range Interpretation Comments

 

             UA pH (test code = UA pH) 5.5 1        5.0-8.0                   



[facility] AND [id number] 14:47:00





             Test Item    Value        Reference Range Interpretation Comments

 

             UA Glucose (test code = UA Glucose) NEGATIVE                       

        



[facility] AND [id number] 14:47:00





             Test Item    Value        Reference Range Interpretation Comments

 

             UA Bili (test code = UA Bili) NEGATIVE                             

  



[facility] AND [id number] 14:47:00





             Test Item    Value        Reference Range Interpretation Comments

 

             UA Ketones (test code = UA Ketones) NEGATIVE                       

        



[facility] AND [id number] 14:47:00





             Test Item    Value        Reference Range Interpretation Comments

 

             UA Blood (test code = UA Blood) 1+                                 

    



[facility] AND [id number] 14:47:00





             Test Item    Value        Reference Range Interpretation Comments

 

             UA Protein (test code = UA Protein) 1+                             

        



[facility] [id number] 14:47:00





             Test Item    Value        Reference Range Interpretation Comments

 

             UA Nitrite (test code = UA Nitrite) POSITIVE                       

        



[facility] [id number] 14:47:00





             Test Item    Value        Reference Range Interpretation Comments

 

             UA Leuk Est (test code = UA Leuk Est) 2+                           

          



[facility] [id number] 14:47:00





             Test Item    Value        Reference Range Interpretation Comments

 

             UA WBC (test code = UA WBC) > OR = 60                              



[facility] [id number] 14:47:00





             Test Item    Value        Reference Range Interpretation Comments

 

             UA RBC (test code = UA RBC) 0-2                                    



[facility] [id number] 14:47:00





             Test Item    Value        Reference Range Interpretation Comments

 

             UA Sq Epi (test code = UA Sq Epi) NONE SEEN                        

      



[facility] [id number] 14:47:00





             Test Item    Value        Reference Range Interpretation Comments

 

             UA Bacteria (test code = UA Bacteria) MANY                         

          



[facility] [id number] 14:47:00





             Test Item    Value        Reference Range Interpretation Comments

 

             UA Hyal Cast (test code = UA Hyal NONE SEEN                        

      



             Cast)                                               



[facility] [id number] 14:47:00





             Test Item    Value        Reference Range Interpretation Comments

 

             UA Reflex (test code CULTURE INDICATED -                           



             = UA Reflex) RESULTS TO FOLLOW                           



[facility] [id number] 14:47:00





             Test Item    Value        Reference Range Interpretation Comments

 

             U Creat mg/dL (test code = U Creat 114                       

       



             mg/dL)                                              



[facility] [id number] 14:47:00





             Test Item    Value        Reference Range Interpretation Comments

 

             U Alb (test code = U Alb) 16.7                                   



[facility] [id number] 14:47:00





             Test Item    Value        Reference Range Interpretation Comments

 

             U Alb/Crea (test code = U Alb/Crea) 146                            

        



[facility] [id number] 14:47:00





             Test Item    Value        Reference Range Interpretation Comments

 

             Vitamin D, 25-OH, Total (test code = 37                      

         



             Vitamin D, 25-OH, Total)                                        



[facility] [id number] 14:47:00





             Test Item    Value        Reference Range Interpretation Comments

 

             U Creat mg/dL (test code = U Creat 114                       

       



             mg/dL)                                              



[place]64290-08-06 14:47:00





             Test Item    Value        Reference Range Interpretation Comments

 

             U Prot/Creat (test code = U Prot/Creat) 430                  

            



[place] 664290-08-06 14:47:00





             Test Item    Value        Reference Range Interpretation Comments

 

             U Prot/Creat (test code = U 0.430 1      0.021-0.161               



             Prot/Creat)                                         



[facility]2020-08-06 14:47:00





             Test Item    Value        Reference Range Interpretation Comments

 

             U Protein (test code = U Protein) 49           5-24                

      



[place] 664290-08-06 14:47:00





             Test Item    Value        Reference Range Interpretation Comments

 

             Glucose Lvl (test code = Glucose Lvl) 103          65-99           

          



[place] 664290-08-06 14:47:00





             Test Item    Value        Reference Range Interpretation Comments

 

             BUN (test code = BUN) 24           7-25                      



[place] 664290-08-06 14:47:00





             Test Item    Value        Reference Range Interpretation Comments

 

             Creatinine Lvl (test code = Creatinine 1.32         0.50-0.99      

           



             Lvl)                                                



[place] 664290-08-06 14:47:00





             Test Item    Value        Reference Range Interpretation Comments

 

             eGFR NON-AFR. AMERICAN (test code = 43                             

        



             eGFR NON-AFR. AMERICAN)                                        



[facility]2020-08-06 14:47:00





             Test Item    Value        Reference Range Interpretation Comments

 

             eGFR  (test code = eGFR 50                         

            



             )                                        



[place] 664290-08-06 14:47:00





             Test Item    Value        Reference Range Interpretation Comments

 

             B/C Ratio (test code = B/C Ratio) 18           6-22                

      



[place] 664290-08-06 14:47:00





             Test Item    Value        Reference Range Interpretation Comments

 

             Sodium Lvl (test code = Sodium Lvl) 138          135-146           

        



[place] 664290-08-06 14:47:00





             Test Item    Value        Reference Range Interpretation Comments

 

             Potassium Lvl (test code = Potassium 4.4          3.5-5.3          

         



             Lvl)                                                



[place] 664290-08-06 14:47:00





             Test Item    Value        Reference Range Interpretation Comments

 

             Chloride Lvl (test code = Chloride Lvl) 102                  

            



[place] 664290-08-06 14:47:00





             Test Item    Value        Reference Range Interpretation Comments

 

             CO2 (test code = CO2) 30           20-32                     



[facility]2020-08-06 14:47:00





             Test Item    Value        Reference Range Interpretation Comments

 

             Calcium Lvl (test code = Calcium Lvl) 9.4          8.6-10.4        

          



[place] [address]-08-06 14:47:00





             Test Item    Value        Reference Range Interpretation Comments

 

             Phosphorus (test code = Phosphorus) 4.8          2.5-4.5           

        



[place] 664290-08-06 14:47:00





             Test Item    Value        Reference Range Interpretation Comments

 

             Albumin Lvl (test code = Albumin Lvl) 3.9          3.6-5.1         

          



[place] [address]-08-06 14:47:00





             Test Item    Value        Reference Range Interpretation Comments

 

             Plt Count Estimated (test code = DECREASED                         

     



             Plt Count Estimated)                                        



[place] [address]-08-06 14:47:00





             Test Item    Value        Reference Range Interpretation Comments

 

             WBC X 10x3 (test code = WBC X 10x3) 7.2          3.8-10.8          

        



[place] [address]-08-06 14:47:00





             Test Item    Value        Reference Range Interpretation Comments

 

             RBC X 10x6 (test code = RBC X 10x6) 3.71         3.80-5.10         

        



[place] [address]-08-06 14:47:00





             Test Item    Value        Reference Range Interpretation Comments

 

             Hgb (test code = Hgb) 10.7         11.7-15.5                 



[place] [address]-08-06 14:47:00





             Test Item    Value        Reference Range Interpretation Comments

 

             Hct (test code = Hct) 33.9         35.0-45.0                 



[place] [address]-08-06 14:47:00





             Test Item    Value        Reference Range Interpretation Comments

 

             MCV (test code = MCV) 91.4         80.0-100.0                



[place] [address]-08-06 14:47:00





             Test Item    Value        Reference Range Interpretation Comments

 

             MCH (test code = MCH) 28.8 pg      27.0-33.0                 



[place] [address]-08-06 14:47:00





             Test Item    Value        Reference Range Interpretation Comments

 

             MCHC (test code = MCHC) 31.6         32.0-36.0                 



[place] [address]-08-06 14:47:00





             Test Item    Value        Reference Range Interpretation Comments

 

             RDW (test code = RDW) 13.9         11.0-15.0                 



[id number] 14:47:00





             Test Item    Value        Reference Range Interpretation Comments

 

             Platelet (test code = Platelet) 110          140-400               

    



[facility] S[id number] 14:47:00





             Test Item    Value        Reference Range Interpretation Comments

 

             MPV (test code = MPV) 11.7         7.5-12.5                  



[facility] [id number] 14:47:00





             Test Item    Value        Reference Range Interpretation Comments

 

             Neutrophils # (test code = Neutrophils 5414         0566-5920      

           



             #)                                                  



[facility] SystemSpvdfqqDMYNGVPFRW8465-44-46 14:47:00





             Test Item    Value        Reference Range Interpretation Comments

 

             Lymphocytes # (test code = Lymphocytes 1152         850-3900       

           



             #)                                                  



[facility] SystemZnsftxxJBOKCLPAMC0669-79-27 14:47:00





             Test Item    Value        Reference Range Interpretation Comments

 

             Monocytes # (test code = Monocytes #) 461          200-950         

          



[facility] SystemOnpzzktYESAKRVSYO7576-15-36 14:47:00





             Test Item    Value        Reference Range Interpretation Comments

 

             Eosinophils # (test code = Eosinophils 122                   

           



             #)                                                  



[facility] SystemZefrwzoAEOWRFLIQY6811-57-33 14:47:00





             Test Item    Value        Reference Range Interpretation Comments

 

             Basophils # (test code 50           See_Comment                [Aut

omated message] The



             = Basophils #)                                        system which 

generated



                                                                 this result tra

nsmitted



                                                                 reference range

: <=200.



                                                                 The reference r

cheyenne was



                                                                 not used to int

erpret



                                                                 this result as



                                                                 normal/abnormal

.



[facility]dib[facility]ceVTCPXSPXKE9375-53-32 14:47:[facility]





             Test Item    Value        Reference Range Interpretation Comments

 

             Segs (test code = Segs) 75.2                                   



[facility] SystemGfrmrepXAQUIYSRFM0388-42-66 14:47:00





             Test Item    Value        Reference Range Interpretation Comments

 

             Lymphocytes (test code = Lymphocytes) 16.0                         

          



[facility] SystemBptozhaBOKVULWVKI4418-62-40 14:47:00





             Test Item    Value        Reference Range Interpretation Comments

 

             Monocytes (test code = Monocytes) 6.4                              

      



[facility] SystemCsilcrjPPQTQTXBWG5752-29-02 14:47:00





             Test Item    Value        Reference Range Interpretation Comments

 

             Eosinophils (test code = Eosinophils) 1.7                          

          



[facility] SystemOiesoshLWVSMFCAHT1615-35-52 14:47:00





             Test Item    Value        Reference Range Interpretation Comments

 

             Basophils (test code = Basophils) 0.7                              

      



[facility] [id number] 14:47:00





             Test Item    Value        Reference Range Interpretation Comments

 

             Result 2 (Urine Culture) See Result Comment                        

   



             (test code = Result 2                                        



             (Urine Culture))                                        



[facility] AND [id number] 14:47:00





             Test Item    Value        Reference Range Interpretation Comments

 

             UA Color (test code = UA Color) YELLOW                             

    



[facility] AND [id number] 14:47:00





             Test Item    Value        Reference Range Interpretation Comments

 

             UA Turbidity (test code = UA CLOUDY                                

 



             Turbidity)                                          



[facility] AND [id number] 14:47:00





             Test Item    Value        Reference Range Interpretation Comments

 

             UA Spec Grav (test code = UA Spec 1.017 1      1.001-1.035         

      



             Grav)                                               



[facility] AND [id number] 14:47:00





             Test Item    Value        Reference Range Interpretation Comments

 

             UA pH (test code = UA pH) 5.5 1        5.0-8.0                   



[facility] AND [id number] 14:47:00





             Test Item    Value        Reference Range Interpretation Comments

 

             UA Glucose (test code = UA Glucose) NEGATIVE                       

        



[facility] AND [id number] 14:47:00





             Test Item    Value        Reference Range Interpretation Comments

 

             UA Bili (test code = UA Bili) NEGATIVE                             

  



[facility] AND [id number] 14:47:00





             Test Item    Value        Reference Range Interpretation Comments

 

             UA Ketones (test code = UA Ketones) NEGATIVE                       

        



[facility] AND [id number] 14:47:00





             Test Item    Value        Reference Range Interpretation Comments

 

             UA Blood (test code = UA Blood) 1+                                 

    



[facility] AND [id number] 14:47:00





             Test Item    Value        Reference Range Interpretation Comments

 

             UA Protein (test code = UA Protein) 1+                             

        



[facility] [id number] 14:47:00





             Test Item    Value        Reference Range Interpretation Comments

 

             UA Nitrite (test code = UA Nitrite) POSITIVE                       

        



[facility] AND [id number] 14:47:00





             Test Item    Value        Reference Range Interpretation Comments

 

             UA Leuk Est (test code = UA Leuk Est) 2+                           

          



[facility] [id number] 14:47:00





             Test Item    Value        Reference Range Interpretation Comments

 

             UA WBC (test code = UA WBC) > OR = 60                              



[facility] [id number] 14:47:00





             Test Item    Value        Reference Range Interpretation Comments

 

             UA RBC (test code = UA RBC) 0-2                                    



[facility] [id number] 14:47:00





             Test Item    Value        Reference Range Interpretation Comments

 

             UA Sq Epi (test code = UA Sq Epi) NONE SEEN                        

      



[facility] [id number] 14:47:00





             Test Item    Value        Reference Range Interpretation Comments

 

             UA Bacteria (test code = UA Bacteria) MANY                         

          



[facility] [id number] 14:47:00





             Test Item    Value        Reference Range Interpretation Comments

 

             UA Hyal Cast (test code = UA Hyal NONE SEEN                        

      



             Cast)                                               



[facility] [id number] 14:47:00





             Test Item    Value        Reference Range Interpretation Comments

 

             UA Reflex (test code CULTURE INDICATED -                           



             = UA Reflex) RESULTS TO FOLLOW                           



[facility] [id number] 14:47:00





             Test Item    Value        Reference Range Interpretation Comments

 

             U Creat mg/dL (test code = U Creat 114                       

       



             mg/dL)                                              



[facility] [id number] 14:47:00





             Test Item    Value        Reference Range Interpretation Comments

 

             U Alb (test code = U Alb) 16.7                                   



[facility] [id number] 14:47:00





             Test Item    Value        Reference Range Interpretation Comments

 

             U Alb/Crea (test code = U Alb/Crea) 146                            

        



[facility] PROTEIN ELECTROPHORESIS-24HR [id number] 08:01:00





             Test Item    Value        Reference Range Interpretation Comments

 

             CREATININE, 24 HOUR 1.45 g/24 h  0.50-2.15                 



             URINE (test code =                                        



             78060806)                                           

 

             PROTEIN/CREATININE 292 mg/g creat < OR = 114   H            



             RATION (test code =                                        



             17311747)                                           

 

             PROTEIN, TOTAL 24 HR  mg/24 h  <150         H            TEST

 PERFORMED



             (test code = 00274792)                                        AT:QU

EST



                                                                 [facility]

IN



                                                                 [address], [place]



                                                                 [id number] FELIX MD

 

             ALBUMIN (test code = 35 %                                   



             20567189)                                           

 

             ALPHA-1-GLOBULINS (test 10 %                                   



             code = 47473369)                                        

 

             ALPHA-2-GLOBULINS (test 12 %                                   



             code = 22734396)                                        

 

             BETA GLOBULINS (test 25 %                                   



             code = 48154285)                                        

 

             GAMMA GLOBULINS (test 18 %                                   



             code = 11791517)                                        

 

             INTERPRETATION (test                                        Albumin

 and



             code = 82840434)                                        various aba

bulin



                                                                 fractions



                                                                 detected on



                                                                 proteinelectrop

ho



                                                                 resis. No



                                                                 abnormal protei

n



                                                                 bands



                                                                 (Bence-Jonespro

te



                                                                 inuria)



                                                                 detected.TEST



                                                                 PERFORMED



                                                                 AT:[facility]

IN



                                                                 [address]RIDDHI [place]VAREZ



                                                                 [id number] FELIX MD



NEUTROPHIL CYTOPLASMIC [id number]--[id number] 05:19:00





             Test Item    Value        Reference Range Interpretation Comments

 

             ANCA SCREEN (test NEGATIVE     NEGATIVE                  ANCA Scree

n includes



             code = [id number])                                        evaluation 

for p-ANCA,



                                                                 c-ANCA andatypi

tayla p-ANCA.



                                                                 A positive ANCA

 screen



                                                                 reflexes to tit

erand



                                                                 pattern(s), e.g

.,



                                                                 cytoplasmic pat

tern



                                                                 (c-ANCA),perinu

clear



                                                                 pattern (p-ANCA

), or



                                                                 atypical p-ANCA



                                                                 pattern.c-ANCA 

and p-ANCA



                                                                 are observed in

 vasculitis,



                                                                 whereasatypical

 p-ANCA is



                                                                 observed in IBD



                                                                 (Inflammatory



                                                                 BowelDisease). 

Atypical



                                                                 p-ANCA is detec

kolby in about



                                                                 55% to 80% ofpa

tients with



                                                                 ulcerative coli

tis but only



                                                                 5% to 25% ofpat

ients with



                                                                 Crohn's disease

.TEST



                                                                 PERFORMED AT:[email or web address]



                                                                 DIAGNOSTICS/[facility]



                                                                 [id number] [facility] [place] [place]



                                                                 [id number] MARIEE MD,PHD

,RAMILA



COMPREHENSIVE METABOLIC [id number] 06:25:00





             Test Item    Value        Reference Range Interpretation Comments

 

             GLUCOSE (test code = 06D) 115 mg/dL           H            

 

             SODIUM (test code = 01A) 137 mmol/L   136-145                   

 

             POTASSIUM (test code = 01B) 3.3 mmol/L   3.6-5.1      L            

 

             CHLORIDE (test code = 04A) 97 mmol/L           L            

 

             CO2 (test code = 02A) 32 mmol/L    22-32                     

 

             ANION GAP (test code = ANG) 11.3 mmol/L                            

 

             BUN (test code = 05D) 36 mg/dL     7-18         H            

 

             CREATININE (test code = 03E) 1.4 mg/dL    0.4-1.1      H           

 

 

             BUN/CREA (test code = BCR) 25           12-20        H            

 

             CALCIUM (test code = 09D) 8.8 mg/dL    8.3-9.5                   

 

             BILI TOTAL (test code = 11A) 1.2 mg/dL    0.2-1.0      H           

 

 

             PROTEIN (test code = 07D) 6.5 g/dL     6.4-8.2                   

 

             ALBUMIN (test code = 08D) 2.9 g/dL     3.5-4.8      L            

 

             GLOBULIN (test code = GLB) 3.6 g/dL     1.5-3.8                   

 

             ALB/GLOB (test code = AGRR) 0.8          1.0-2.6      L            

 

             ALK PHOS (test code = 35A) 97 IU/L                          

 

             AST (test code = 30A) 15 IU/L      <=42                      

 

             ALT (test code = 31A) 35 IU/L      <=78                      



CBC (INCLUDES AUTOMATED DIFFERENTIAL)2019 06:06:00





             Test Item    Value        Reference Range Interpretation Comments

 

             WBC (test code = WBC) 6.9 10\S\3/uL 4.5-11.0                  

 

             RBC (test code = RBC) 3.56 10\S\6/uL 4.20-5.60    L            

 

             HGB (test code = HBG) 10.4 g/dL    12.0-15.5    L            

 

             HCT (test code = HCT) 32.1 %       35.0-44.0    L            

 

             MCV (test code = MCV) 90.2 fL      81.0-99.0                 

 

             MCH (test code = MCH) 29.2 pg      27.0-31.0                 

 

             MCHC (test code = MCHC) 32.4 g/dL    32.0-36.0                 

 

             RDW (test code = RDW) 15.0 %       11.5-14.5    H            

 

             PLT (test code = PLT) 158 10\S\3/uL 130-400                   

 

             MPV (test code = MPV) 10.7 fL      9.4-12.4                  

 

             NEUTROP # (test code = NE#) 4.4 10\S\3/uL 1.6-8.0                  

 

 

             LYMPH # (test code = LY#) 1.8 10\S\3/uL 1.1-3.5                   

 

             MONOCYTE # (test code = MO#) 0.5 10\S\3/uL 0.0-1.1                 

  

 

             EOSINOPH # (test code = EO#) 0.2 10\S\3/uL 0.0-0.7                 

  

 

             BASOPHIL # (test code = BA#) 0.0 10\S\3/uL 0.0-0.3                 

  

 

             IG # (test code = IG#) 0.03 10\S\3/uL 0.00-0.06                 

 

             NRBC # (test code = NRBC#) 0.00 10\S\3/uL 0.00-0.01                

 

 

             NEUTROPH % (test code = NE%) 64.0 %       35.0-73.0                

 

 

             LYMPH % (test code = LY%) 26.1 %       20.0-55.0                 

 

             MONO % (test code = MO%) 6.5 %        2.5-10.0                  

 

             EOSINOPH % (test code = EO%) 2.6 %        0.0-5.0                  

 

 

             BASOPHIL % (test code = BA%) 0.4 %        0.0-2.0                  

 

 

             IG % (test code = IG%) 0.4 %        0.0-0.8                   

 

             NRBC% (test code = NRBC%) 0.0 %        0.0-0.2                   

 

             MANDIFF (test code = MDIFF) NO           NO                        

 

             RBC MORPH (test code = RBCMOR)              NORMAL                 

   



URINE [id number] 09:53:00





             Test Item    Value        Reference Range Interpretation Comments

 

             Isolate 1 (test code = Proteus mirabilis              A            



             ISO1)                                               

 

             ampicillin (test code = am)  ug/mL                    S            

 

             ampicillin/sulbactam (test  ug/mL                    S            



             code = ams)                                         

 

             piperacillin/tazobactam  ug/mL                    S            



             (test code = tzp)                                        

 

             ceftazidime (test code =  ug/mL                    S            



             jeannine)                                                

 

             ceftriaxone1 (test code =  ug/mL                    S            



             ctr)                                                

 

             cefepime (test code = fep)  ug/mL                    S            

 

             aztreonam (test code = azm)  ug/mL                    S            

 

             ertapenem (test code = etp)  ug/mL                    S            

 

             meropenem (test code = mem)  ug/mL                    S            

 

             gentamicin (test code = gm)  ug/mL                    S            

 

             tobramycin (test code =  ug/mL                    S            



             tob)                                                

 

             levofloxacin (test code =  ug/mL                    S            



             lev)                                                

 

             trimethoprim/sulfamethoxazo  ug/mL                    S            



             le (test code = sxt)                                        



PARTHYROID HORMONE (INTACT)2019 08:24:00





             Test Item    Value        Reference Range Interpretation Comments

 

             PTH INTACT (test code 166.6 pg/mL  18.4-80.1    H            



             = A85)                                              

 

             Ref Range Change ***** Please note the                           



             (test code = REF change in reference                           



             RANGE)       range ***                              



VITAMIN D TOTAL 25 (OH)2019 07:15:00





             Test Item    Value        Reference Range Interpretation Comments

 

             VITAMIN D 25(OH) (test code = VD) 36.0 ng/mL   30.0-100.0          

      



SERUM PROTEIN [id number] 06:20:00





             Test Item    Value        Reference Range Interpretation Comments

 

             PROTEIN TOTAL (test 5.7 g/dL     6.1-8.1      L            TEST PER

FORMED AT:[facility]



             code = [id number])                                        DIAGNOSTICS

[id number]



                                                                 [facility]

[place], [place]



                                                                 [id number] FELIX MD

 

             ALBUMIN (test code = 3.2 g/dL     3.8-4.8      L            



             [id number])                                           

 

             ALPHA-1-GLOBULINS 0.4 g/dL     0.2-0.3      H            



             (test code =                                        



             79250016)                                           

 

             ALPHA-2-GLOBULINS 0.8 g/dL     0.5-0.9                   



             (test code =                                        



             68250018)                                           

 

             BETA 1 GLOBULIN (test 0.5 g/dL     0.4-0.6                   



             code = [id number])                                        

 

             BETA 2 GLOBULIN (test 0.2 g/dL     0.2-0.5                   



             code = 50602901)                                        

 

             GAMMA GLOBULINS (test 0.6 g/dL     0.8-1.7      L            



             code = 71167459)                                        

 

             INTERPRETATION (test                                        Pattern

 consistent with



             code = [id number])                                        an acute ph

ase



                                                                 reactionConsist

ent with



                                                                 hypogammaglobul

inemia.



                                                                 Serum free ligh

tchains



                                                                 or urine immuno

fixation



                                                                 should be consi

dered



                                                                 ifplasma cell d

yscrasias



                                                                 are a possible



                                                                 clinicaldiagnos

is.TEST



                                                                 PERFORMED AT:[facility]

[facility]



                                                                 [facility]

[id number]



                                                                 [facility]

[place], [place]



                                                                 [id number] FELIX MD



[id number] 06:13:00





             Test Item    Value        Reference Range Interpretation Comments

 

             MAGNESIUM (test code = 48A) 1.7 mg/dL    1.8-2.4      L            



COMPREHENSIVE METABOLIC [id number] 06:13:00





             Test Item    Value        Reference Range Interpretation Comments

 

             GLUCOSE (test code = 06D) 110 mg/dL           H            

 

             SODIUM (test code = 01A) 140 mmol/L   136-145                   

 

             POTASSIUM (test code = 01B) 3.1 mmol/L   3.6-5.1      L            

 

             CHLORIDE (test code = 04A) 96 mmol/L           L            

 

             CO2 (test code = 02A) 34 mmol/L    22-32        H            

 

             ANION GAP (test code = ANG) 13.1 mmol/L                            

 

             BUN (test code = 05D) 33 mg/dL     7-18         H            

 

             CREATININE (test code = 03E) 1.5 mg/dL    0.4-1.1      H           

 

 

             BUN/CREA (test code = BCR) 22           12-20        H            

 

             CALCIUM (test code = 09D) 9.1 mg/dL    8.3-9.5                   

 

             BILI TOTAL (test code = 11A) 1.4 mg/dL    0.2-1.0      H           

 

 

             PROTEIN (test code = 07D) 7.0 g/dL     6.4-8.2                   

 

             ALBUMIN (test code = 08D) 3.2 g/dL     3.5-4.8      L            

 

             GLOBULIN (test code = GLB) 3.8 g/dL     1.5-3.8                   

 

             ALB/GLOB (test code = AGRR) 0.8          1.0-2.6      L            

 

             ALK PHOS (test code = 35A) 111 IU/L                         

 

             AST (test code = 30A) 18 IU/L      <=42                      

 

             ALT (test code = 31A) 43 IU/L      <=78                      



PHOSPHORUS (P04)2019 06:13:00





             Test Item    Value        Reference Range Interpretation Comments

 

             PHOSPHORUS (test code = 43D) 4.0 mg/dL    2.7-4.6                  

 



CBC (INCLUDES AUTOMATED DIFFERENTIAL)2019 05:48:00





             Test Item    Value        Reference Range Interpretation Comments

 

             WBC (test code = WBC) 9.4 10\S\3/uL 4.5-11.0                  

 

             RBC (test code = RBC) 3.73 10\S\6/uL 4.20-5.60    L            

 

             HGB (test code = HBG) 10.8 g/dL    12.0-15.5    L            

 

             HCT (test code = HCT) 33.8 %       35.0-44.0    L            

 

             MCV (test code = MCV) 90.6 fL      81.0-99.0                 

 

             MCH (test code = MCH) 29.0 pg      27.0-31.0                 

 

             MCHC (test code = MCHC) 32.0 g/dL    32.0-36.0                 

 

             RDW (test code = RDW) 15.1 %       11.5-14.5    H            

 

             PLT (test code = PLT) 189 10\S\3/uL 130-400                   

 

             MPV (test code = MPV) 11.8 fL      9.4-12.4                  

 

             NEUTROP # (test code = NE#) 7.0 10\S\3/uL 1.6-8.0                  

 

 

             LYMPH # (test code = LY#) 1.6 10\S\3/uL 1.1-3.5                   

 

             MONOCYTE # (test code = MO#) 0.7 10\S\3/uL 0.0-1.1                 

  

 

             EOSINOPH # (test code = EO#) 0.1 10\S\3/uL 0.0-0.7                 

  

 

             BASOPHIL # (test code = BA#) 0.0 10\S\3/uL 0.0-0.3                 

  

 

             IG # (test code = IG#) 0.06 10\S\3/uL 0.00-0.06                 

 

             NRBC # (test code = NRBC#) 0.00 10\S\3/uL 0.00-0.01                

 

 

             NEUTROPH % (test code = NE%) 74.5 %       35.0-73.0    H           

 

 

             LYMPH % (test code = LY%) 16.5 %       20.0-55.0    L            

 

             MONO % (test code = MO%) 7.0 %        2.5-10.0                  

 

             EOSINOPH % (test code = EO%) 1.1 %        0.0-5.0                  

 

 

             BASOPHIL % (test code = BA%) 0.3 %        0.0-2.0                  

 

 

             IG % (test code = IG%) 0.6 %        0.0-0.8                   

 

             NRBC% (test code = NRBC%) 0.0 %        0.0-0.2                   

 

             MANDIFF (test code = MDIFF) NO           NO                        

 

             RBC MORPH (test code = RBCMOR)              NORMAL                 

   



COMPREHENSIVE METABOLIC [id number] 05:30:00





             Test Item    Value        Reference Range Interpretation Comments

 

             GLUCOSE (test code = 06D) 122 mg/dL           H            

 

             SODIUM (test code = 01A) 140 mmol/L   136-145                   

 

             POTASSIUM (test code = 01B) 3.8 mmol/L   3.6-5.1                   

 

             CHLORIDE (test code = 04A) 100 mmol/L                       

 

             CO2 (test code = 02A) 32 mmol/L    22-32                     

 

             ANION GAP (test code = ANG) 11.8 mmol/L                            

 

             BUN (test code = 05D) 29 mg/dL     7-18         H            

 

             CREATININE (test code = 03E) 1.5 mg/dL    0.4-1.1      H           

 

 

             BUN/CREA (test code = BCR) 20           12-20                     

 

             CALCIUM (test code = 09D) 9.0 mg/dL    8.3-9.5                   

 

             BILI TOTAL (test code = 11A) 1.3 mg/dL    0.2-1.0      H           

 

 

             PROTEIN (test code = 07D) 7.1 g/dL     6.4-8.2                   

 

             ALBUMIN (test code = 08D) 3.5 g/dL     3.5-4.8                   

 

             GLOBULIN (test code = GLB) 3.6 g/dL     1.5-3.8                   

 

             ALB/GLOB (test code = AGRR) 1.0          1.0-2.6                   

 

             ALK PHOS (test code = 35A) 119 IU/L                         

 

             AST (test code = 30A) 22 IU/L      <=42                      

 

             ALT (test code = 31A) 49 IU/L      <=78                      



CBC (INCLUDES AUTOMATED DIFFERENTIAL)2019 05:18:00





             Test Item    Value        Reference Range Interpretation Comments

 

             WBC (test code = WBC) 9.0 10\S\3/uL 4.5-11.0                  

 

             RBC (test code = RBC) 3.94 10\S\6/uL 4.20-5.60    L            

 

             HGB (test code = HBG) 11.4 g/dL    12.0-15.5    L            

 

             HCT (test code = HCT) 35.8 %       35.0-44.0                 

 

             MCV (test code = MCV) 90.9 fL      81.0-99.0                 

 

             MCH (test code = MCH) 28.9 pg      27.0-31.0                 

 

             MCHC (test code = MCHC) 31.8 g/dL    32.0-36.0    L            

 

             RDW (test code = RDW) 14.8 %       11.5-14.5    H            

 

             PLT (test code = PLT) 164 10\S\3/uL 130-400                   

 

             MPV (test code = MPV) 11.6 fL      9.4-12.4                  

 

             NEUTROP # (test code = NE#) 6.8 10\S\3/uL 1.6-8.0                  

 

 

             LYMPH # (test code = LY#) 1.4 10\S\3/uL 1.1-3.5                   

 

             MONOCYTE # (test code = MO#) 0.6 10\S\3/uL 0.0-1.1                 

  

 

             EOSINOPH # (test code = EO#) 0.1 10\S\3/uL 0.0-0.7                 

  

 

             BASOPHIL # (test code = BA#) 0.0 10\S\3/uL 0.0-0.3                 

  

 

             IG # (test code = IG#) 0.06 10\S\3/uL 0.00-0.06                 

 

             NRBC # (test code = NRBC#) 0.00 10\S\3/uL 0.00-0.01                

 

 

             NEUTROPH % (test code = NE%) 75.6 %       35.0-73.0    H           

 

 

             LYMPH % (test code = LY%) 15.9 %       20.0-55.0    L            

 

             MONO % (test code = MO%) 6.5 %        2.5-10.0                  

 

             EOSINOPH % (test code = EO%) 0.9 %        0.0-5.0                  

 

 

             BASOPHIL % (test code = BA%) 0.4 %        0.0-2.0                  

 

 

             IG % (test code = IG%) 0.7 %        0.0-0.8                   

 

             NRBC% (test code = NRBC%) 0.0 %        0.0-0.2                   

 

             MANDIFF (test code = MDIFF) NO           NO                        

 

             RBC MORPH (test code = RBCMOR)              NORMAL                 

   



URINALYSIS WITH [id number] 06:44:00





             Test Item    Value        Reference Range Interpretation Comments

 

             COLOR (test code = COLU) YELLOW       YELLOW                    

 

             CLARITY (test code = CLA) CLOUDY       CLEAR        A            

 

             GLUCOSE UR (test code = UA NEGATIVE     NEGATIVE                  



             GLUCOSE)                                            

 

             BILI UR (test code = BILE) NEGATIVE     NEGATIVE                  

 

             KETONES UR (test code = ACE) NEGATIVE     NEGATIVE                 

 

 

             SP GRAVITY (test code = SPGR) 1.014        1.005-1.030             

  

 

             PH UR (test code = PH) 6.0          4.5-8.0                   

 

             PROTEIN UR (test code = PU) TRACE        NEGATIVE     A            

 

             UROBIL UR (test code = UROQ) 0.2 EU/dL    0.2-1.0                  

 

 

             NITRITE UR (test code = NEGATIVE     NEGATIVE                  



             NITRITE)                                            

 

             BLOOD UR (test code = UA BLOOD) TRACE        NEGATIVE     A        

    

 

             LEUK ES UR (test code = LEUK) 2+           NEGATIVE     A          

  

 

             WBC UR (test code = UWBC) 12 /HPF      0-5          H            

 

             RBC UR (test code = URBC) 1 /HPF       0-2                       

 

             EPITH UR (test code = UEPC) FEW /LPF     FEW                       

 

             BACTERIA UR (test code = UBACT) MODERATE /HPF NONE         A       

     

 

             CAST UR (test code = CAST)  /LPF        NONE                      

 

             CRYSTAL UR (test code = CRYU)  / LPF       NONE                    

  

 

             MUCUS UR (test code = MUC)  / HPF       NONE                      

 

             AMORPH UR (test code = YASMEEN)  / HPF       NONE                     

 

 

             TRICH UR (test code = UTRICH)  /HPF        NONE                    

  

 

             YEAST UR (test code = UY)  /HPF        NONE                      

 

             SPERM UR (test code = USPERM)  /HPF        NONE                    

  



COMPREHENSIVE METABOLIC [id number] 05:24:00





             Test Item    Value        Reference Range Interpretation Comments

 

             GLUCOSE (test code = 06D) 105 mg/dL           H            

 

             SODIUM (test code = 01A) 138 mmol/L   136-145                   

 

             POTASSIUM (test code = 01B) 4.0 mmol/L   3.6-5.1                   

 

             CHLORIDE (test code = 04A) 104 mmol/L                       

 

             CO2 (test code = 02A) 25 mmol/L    22-32                     

 

             ANION GAP (test code = ANG) 13.0 mmol/L                            

 

             BUN (test code = 05D) 29 mg/dL     7-18         H            

 

             CREATININE (test code = 03E) 1.5 mg/dL    0.4-1.1      H           

 

 

             BUN/CREA (test code = BCR) 19           12-20                     

 

             CALCIUM (test code = 09D) 8.4 mg/dL    8.3-9.5                   

 

             BILI TOTAL (test code = 11A) 0.7 mg/dL    0.2-1.0                  

 

 

             PROTEIN (test code = 07D) 6.2 g/dL     6.4-8.2      L            

 

             ALBUMIN (test code = 08D) 3.1 g/dL     3.5-4.8      L            

 

             GLOBULIN (test code = GLB) 3.1 g/dL     1.5-3.8                   

 

             ALB/GLOB (test code = AGRR) 1.0          1.0-2.6                   

 

             ALK PHOS (test code = 35A) 104 IU/L                         

 

             AST (test code = 30A) 22 IU/L      <=42                      

 

             ALT (test code = 31A) 42 IU/L      <=78                      



CBC (INCLUDES AUTOMATED DIFFERENTIAL)2019 05:07:00





             Test Item    Value        Reference Range Interpretation Comments

 

             WBC (test code = WBC) 8.6 10\S\3/uL 4.5-11.0                  

 

             RBC (test code = RBC) 3.72 10\S\6/uL 4.20-5.60    L            

 

             HGB (test code = HBG) 10.8 g/dL    12.0-15.5    L            

 

             HCT (test code = HCT) 33.7 %       35.0-44.0    L            

 

             MCV (test code = MCV) 90.6 fL      81.0-99.0                 

 

             MCH (test code = MCH) 29.0 pg      27.0-31.0                 

 

             MCHC (test code = MCHC) 32.0 g/dL    32.0-36.0                 

 

             RDW (test code = RDW) 14.7 %       11.5-14.5    H            

 

             PLT (test code = PLT) 152 10\S\3/uL 130-400                   

 

             MPV (test code = MPV) 11.4 fL      9.4-12.4                  

 

             NEUTROP # (test code = NE#) 6.2 10\S\3/uL 1.6-8.0                  

 

 

             LYMPH # (test code = LY#) 1.6 10\S\3/uL 1.1-3.5                   

 

             MONOCYTE # (test code = MO#) 0.5 10\S\3/uL 0.0-1.1                 

  

 

             EOSINOPH # (test code = EO#) 0.2 10\S\3/uL 0.0-0.7                 

  

 

             BASOPHIL # (test code = BA#) 0.1 10\S\3/uL 0.0-0.3                 

  

 

             IG # (test code = IG#) 0.03 10\S\3/uL 0.00-0.06                 

 

             NRBC # (test code = NRBC#) 0.00 10\S\3/uL 0.00-0.01                

 

 

             NEUTROPH % (test code = NE%) 72.8 %       35.0-73.0                

 

 

             LYMPH % (test code = LY%) 18.5 %       20.0-55.0    L            

 

             MONO % (test code = MO%) 5.8 %        2.5-10.0                  

 

             EOSINOPH % (test code = EO%) 2.0 %        0.0-5.0                  

 

 

             BASOPHIL % (test code = BA%) 0.6 %        0.0-2.0                  

 

 

             IG % (test code = IG%) 0.3 %        0.0-0.8                   

 

             NRBC% (test code = NRBC%) 0.0 %        0.0-0.2                   

 

             MANDIFF (test code = MDIFF) NO           NO                        

 

             RBC MORPH (test code = RBCMOR)              NORMAL                 

   



U/S KIDNEY (RENAL)2019 23:20:42LOCATION: [id number]: 63-year-old female 
who presents with acute nontraumatic kidney injury.COMMENT:Sonographic imaging 
of this patient's retroperitoneum was performed.The right kidney measures 9.9 x 
5.9 x 6.4 cm with a 13 mm cortical thickness. Acyst is seen in the upper pole 
measuring 23 x 19 x 19 mm, and a second cyst isseen in the lower pole measuring 
18 x 16 x 16 mm.The left kidney measures 9.4 x 5.3 x 5.6 cm with a 11 mm 
cortical thickness. Nocysts or masses are seen in the left kidney.Cortical 
echotexture of the kidneys otherwise is unremarkable.There is no evidence of 
hydronephrosis of either kidney.In the urinary bladder only the left urine jet 
was observed on the color flowstudy. The bladder otherwise is 
unremarkable.IMPRESSION:Cystic changes are seen in the upper pole and lower pole
ofthis patient'sright kidney. No gross abnormalities are seen elsewhere in 
either kidney.The urinary bladder is unremarkable. Only the left urine jet was 
observed onthe color flow study.TROPONIN -34-01 07:14:00





             Test Item    Value        Reference Range Interpretation Comments

 

             TROPONIN I (test code = A84) <0.015 ng/mL 0.000-0.045              

 



COMPREHENSIVE METABOLIC [id number] 05:28:00





             Test Item    Value        Reference Range Interpretation Comments

 

             GLUCOSE (test code = 06D) 110 mg/dL           H            

 

             SODIUM (test code = 01A) 138 mmol/L   136-145                   

 

             POTASSIUM (test code = 01B) 3.9 mmol/L   3.6-5.1                   

 

             CHLORIDE (test code = 04A) 106 mmol/L                       

 

             CO2 (test code = 02A) 24 mmol/L    22-32                     

 

             ANION GAP (test code = ANG) 11.9 mmol/L                            

 

             BUN (test code = 05D) 22 mg/dL     7-18         H            

 

             CREATININE (test code = 03E) 1.5 mg/dL    0.4-1.1      H           

 

 

             BUN/CREA (test code = BCR) 15           12-20                     

 

             CALCIUM (test code = 09D) 8.2 mg/dL    8.3-9.5      L            

 

             BILI TOTAL (test code = 11A) 0.6 mg/dL    0.2-1.0                  

 

 

             PROTEIN (test code = 07D) 6.0 g/dL     6.4-8.2      L            

 

             ALBUMIN (test code = 08D) 2.9 g/dL     3.5-4.8      L            

 

             GLOBULIN (test code = GLB) 3.1 g/dL     1.5-3.8                   

 

             ALB/GLOB (test code = AGRR) 0.9          1.0-2.6      L            

 

             ALK PHOS (test code = 35A) 96 IU/L                          

 

             AST (test code = 30A) 19 IU/L      <=42                      

 

             ALT (test code = 31A) 35 IU/L      <=78                      



CBC (INCLUDES AUTOMATED DIFFERENTIAL)2019 05:14:00





             Test Item    Value        Reference Range Interpretation Comments

 

             WBC (test code = WBC) 7.0 10\S\3/uL 4.5-11.0                  

 

             RBC (test code = RBC) 3.64 10\S\6/uL 4.20-5.60    L            

 

             HGB (test code = HBG) 10.6 g/dL    12.0-15.5    L            

 

             HCT (test code = HCT) 33.5 %       35.0-44.0    L            

 

             MCV (test code = MCV) 92.0 fL      81.0-99.0                 

 

             MCH (test code = MCH) 29.1 pg      27.0-31.0                 

 

             MCHC (test code = MCHC) 31.6 g/dL    32.0-36.0    L            

 

             RDW (test code = RDW) 14.9 %       11.5-14.5    H            

 

             PLT (test code = PLT) 145 10\S\3/uL 130-400                   

 

             MPV (test code = MPV) 11.4 fL      9.4-12.4                  

 

             NEUTROP # (test code = NE#) 4.2 10\S\3/uL 1.6-8.0                  

 

 

             LYMPH # (test code = LY#) 2.1 10\S\3/uL 1.1-3.5                   

 

             MONOCYTE # (test code = MO#) 0.5 10\S\3/uL 0.0-1.1                 

  

 

             EOSINOPH # (test code = EO#) 0.1 10\S\3/uL 0.0-0.7                 

  

 

             BASOPHIL # (test code = BA#) 0.0 10\S\3/uL 0.0-0.3                 

  

 

             IG # (test code = IG#) 0.04 10\S\3/uL 0.00-0.06                 

 

             NRBC # (test code = NRBC#) 0.00 10\S\3/uL 0.00-0.01                

 

 

             NEUTROPH % (test code = NE%) 59.7 %       35.0-73.0                

 

 

             LYMPH % (test code = LY%) 30.1 %       20.0-55.0                 

 

             MONO % (test code = MO%) 7.0 %        2.5-10.0                  

 

             EOSINOPH % (test code = EO%) 2.0 %        0.0-5.0                  

 

 

             BASOPHIL % (test code = BA%) 0.6 %        0.0-2.0                  

 

 

             IG % (test code = IG%) 0.6 %        0.0-0.8                   

 

             NRBC% (test code = NRBC%) 0.0 %        0.0-0.2                   

 

             MANDIFF (test code = MDIFF) NO           NO                        

 

             RBC MORPH (test code = RBCMOR)              NORMAL                 

   



COMPREHENSIVE METABOLIC PAN9--15 04:58:00





             Test Item    Value        Reference Range Interpretation Comments

 

             GLUCOSE (test code = 06D) 112 mg/dL           H            

 

             SODIUM (test code = 01A) 143 mmol/L   136-145                   

 

             POTASSIUM (test code = 01B) 4.4 mmol/L   3.6-5.1                   

 

             CHLORIDE (test code = 04A) 108 mmol/L          H            

 

             CO2 (test code = 02A) 27 mmol/L    22-32                     

 

             ANION GAP (test code = ANG) 12.4 mmol/L                            

 

             BUN (test code = 05D) 19 mg/dL     7-18         H            

 

             CREATININE (test code = 03E) 1.4 mg/dL    0.4-1.1      H           

 

 

             BUN/CREA (test code = BCR) 14           12-20                     

 

             CALCIUM (test code = 09D) 8.5 mg/dL    8.3-9.5                   

 

             BILI TOTAL (test code = 11A) 0.9 mg/dL    0.2-1.0                  

 

 

             PROTEIN (test code = 07D) 6.2 g/dL     6.4-8.2      L            

 

             ALBUMIN (test code = 08D) 2.9 g/dL     3.5-4.8      L            

 

             GLOBULIN (test code = GLB) 3.3 g/dL     1.5-3.8                   

 

             ALB/GLOB (test code = AGRR) 0.9          1.0-2.6      L            

 

             ALK PHOS (test code = 35A) 95 IU/L                          

 

             AST (test code = 30A) 19 IU/L      <=42                      

 

             ALT (test code = 31A) 40 IU/L      <=78                      



CBC (INCLUDES AUTOMATED DIFFERENTIAL)2019-08-15 04:51:00





             Test Item    Value        Reference Range Interpretation Comments

 

             WBC (test code = WBC) 8.2 10\S\3/uL 4.5-11.0                  

 

             RBC (test code = RBC) 3.48 10\S\6/uL 4.20-5.60    L            

 

             HGB (test code = HBG) 10.3 g/dL    12.0-15.5    L            

 

             HCT (test code = HCT) 32.4 %       35.0-44.0    L            

 

             MCV (test code = MCV) 93.1 fL      81.0-99.0                 

 

             MCH (test code = MCH) 29.6 pg      27.0-31.0                 

 

             MCHC (test code = MCHC) 31.8 g/dL    32.0-36.0    L            

 

             RDW (test code = RDW) 15.5 %       11.5-14.5    H            

 

             PLT (test code = PLT) 163 10\S\3/uL 130-400                   

 

             MPV (test code = MPV) 11.8 fL      9.4-12.4                  

 

             NEUTROP # (test code = NE#) 5.3 10\S\3/uL 1.6-8.0                  

 

 

             LYMPH # (test code = LY#) 2.0 10\S\3/uL 1.1-3.5                   

 

             MONOCYTE # (test code = MO#) 0.6 10\S\3/uL 0.0-1.1                 

  

 

             EOSINOPH # (test code = EO#) 0.2 10\S\3/uL 0.0-0.7                 

  

 

             BASOPHIL # (test code = BA#) 0.0 10\S\3/uL 0.0-0.3                 

  

 

             IG # (test code = IG#) 0.03 10\S\3/uL 0.00-0.06                 

 

             NRBC # (test code = NRBC#) 0.00 10\S\3/uL 0.00-0.01                

 

 

             NEUTROPH % (test code = NE%) 65.5 %       35.0-73.0                

 

 

             LYMPH % (test code = LY%) 24.2 %       20.0-55.0                 

 

             MONO % (test code = MO%) 7.2 %        2.5-10.0                  

 

             EOSINOPH % (test code = EO%) 2.2 %        0.0-5.0                  

 

 

             BASOPHIL % (test code = BA%) 0.5 %        0.0-2.0                  

 

 

             IG % (test code = IG%) 0.4 %        0.0-0.8                   

 

             NRBC% (test code = NRBC%) 0.0 %        0.0-0.2                   

 

             MANDIFF (test code = MDIFF) NO           NO                        



CARDIAC [id number] 23:10:00





             Test Item    Value        Reference Range Interpretation Comments

 

             TROPONIN I (test code = A84) <0.015 ng/mL 0.000-0.045              

 



U/S VENOUS DOPPLER IVON LOW [id number] 22:23:14LOCATION: [id number]: 
63-year-old female who presents with bilateral leg swelling.COMMENT: Sonographic
imaging of the venous anatomy in both of this patient's legs wasobtained 
utilizing grayscale, color-flow, and Doppler waveform imagingmodalities.The 
common femoral veins, superficial femoral veins, popliteal veins, 
posteriortibial veins, anterior tibial veins, and peroneal veins in both legs 
wereincluded in the study.The anatomy exhibits normal compressibility on 
grayscale study. No suspiciousfilling defects are seen on the color flow study. 
Appropriate waveform responseas are noted on the Dopplerstudy during 
augmentation maneuvers and duringquiet respiration. IMPRESSION:There is no 
sonographic evidence of venous thrombosis in either of thispatient's legs.
CARDIAC [id number] 16:50:00





             Test Item    Value        Reference Range Interpretation Comments

 

             TROPONIN I (test code = A84) <0.015 ng/mL 0.000-0.045              

 



BRAIN NATRIURETIC [id number] 14:39:00





             Test Item    Value        Reference Range Interpretation Comments

 

             proBNP (test code = PBNP) 1337 pg/mL   0-125        H            



THYROID PANEL/SCREEN (TSH)2019 12:05:00





             Test Item    Value        Reference Range Interpretation Comments

 

             TSH (test code = A57) 0.989 uIU/mL 0.358-3.740               



[id number] 12:02:00





             Test Item    Value        Reference Range Interpretation Comments

 

             CPK (test code = 32A) 98 IU/L                          



AMYLASE AND [id number] 12:02:00





             Test Item    Value        Reference Range Interpretation Comments

 

             AMYLASE (test code = 10A) 19 U/L              L            

 

             LIPASE (test code = 60A) 67 IU/L             L            



COMPREHENSIVE METABOLIC [id number] 12:02:00





             Test Item    Value        Reference Range Interpretation Comments

 

             GLUCOSE (test code = 06D) 109 mg/dL           H            

 

             SODIUM (test code = 01A) 142 mmol/L   136-145                   

 

             POTASSIUM (test code = 01B) 4.2 mmol/L   3.6-5.1                   

 

             CHLORIDE (test code = 04A) 109 mmol/L          H            

 

             CO2 (test code = 02A) 26 mmol/L    22-32                     

 

             ANION GAP (test code = ANG) 11.2 mmol/L                            

 

             BUN (test code = 05D) 16 mg/dL     7-18                      

 

             CREATININE (test code = 03E) 1.3 mg/dL    0.4-1.1      H           

 

 

             BUN/CREA (test code = BCR) 13           12-20                     

 

             CALCIUM (test code = 09D) 8.7 mg/dL    8.3-9.5                   

 

             BILI TOTAL (test code = 11A) 1.3 mg/dL    0.2-1.0      H           

 

 

             PROTEIN (test code = 07D) 6.5 g/dL     6.4-8.2                   

 

             ALBUMIN (test code = 08D) 3.2 g/dL     3.5-4.8      L            

 

             GLOBULIN (test code = GLB) 3.3 g/dL     1.5-3.8                   

 

             ALB/GLOB (test code = AGRR) 1.0          1.0-2.6                   

 

             ALK PHOS (test code = 35A) 101 IU/L                         

 

             AST (test code = 30A) 21 IU/L      <=42                      

 

             ALT (test code = 31A) 41 IU/L      <=78                      



TROPONIN -[id number] 11:57:00





             Test Item    Value        Reference Range Interpretation Comments

 

             TROPONIN I (test code = A84) <0.015 ng/mL 0.000-0.045              

 



XR CHEST 1 VIEW [id number] 11:55:22EXAM: XR CHEST 1 VIEW 
PORTABLE.LOCATION: D4.HISTORY: [id number]: Chest pain.COMPARISON: Radiograph dated
2019.TECHNIQUE: Single AP view of the chest was obtained. FINDINGS:The 
heart is enlarged in size. Small left pleural effusion and left basilaropacities
are present. There is elevation of the right hemidiaphragm. No acuteosseous 
abnormality is identified.IMPRESSION:Small left pleural effusion with left 
basilar opacities, which may representatelectasis or infiltrates.Cardiomegaly.
PRO TIME AND [id number] 11:43:00





             Test Item    Value        Reference Range Interpretation Comments

 

             PT (test code = 13.4 s       9.8-13.6                  



             TT)                                                 

 

             INR (test code = 1.2                                    



             INR)                                                

 

             INRH (test code =  **** SUGGESTED THERAPEUTIC                      

     



             INRH)        RANGE FOR INR: **** 2.5 -                           



                          3.5 ** For Patients with                           



                          Prosthetic Valves or                           



                          Patients with  recurrent                           



                          Thromboembolic Events **                           



                          2.0 - 3.0 ** For Most Other                           



                          Applications **                           

 

             PTT (test code = 30.4 s       20.2-38.0                 



             PTT)                                                

 

             PTTH (test code = **** To monitor the                           



             PTTH)        effectiveness of heparin,                           



                          we offer the Anti-Xa                           



                          (Heparin Assay). It can be                           



                          used for either                           



                          unfractionated or LMW                           



                          Heparin. Order Code is                           



                          ANTI-XA ****                           



CBC (INCLUDES AUTOMATED DIFFERENTIAL)2019 11:36:00





             Test Item    Value        Reference Range Interpretation Comments

 

             WBC (test code = WBC) 7.6 10\S\3/uL 4.5-11.0                  

 

             RBC (test code = RBC) 3.53 10\S\6/uL 4.20-5.60    L            

 

             HGB (test code = HBG) 10.3 g/dL    12.0-15.5    L            

 

             HCT (test code = HCT) 33.5 %       35.0-44.0    L            

 

             MCV (test code = MCV) 94.9 fL      81.0-99.0                 

 

             MCH (test code = MCH) 29.2 pg      27.0-31.0                 

 

             MCHC (test code = MCHC) 30.7 g/dL    32.0-36.0    L            

 

             RDW (test code = RDW) 15.4 %       11.5-14.5    H            

 

             PLT (test code = PLT) 164 10\S\3/uL 130-400                   

 

             MPV (test code = MPV) 11.7 fL      9.4-12.4                  

 

             NEUTROP # (test code = NE#) 5.6 10\S\3/uL 1.6-8.0                  

 

 

             LYMPH # (test code = LY#) 1.4 10\S\3/uL 1.1-3.5                   

 

             MONOCYTE # (test code = MO#) 0.4 10\S\3/uL 0.0-1.1                 

  

 

             EOSINOPH # (test code = EO#) 0.1 10\S\3/uL 0.0-0.7                 

  

 

             BASOPHIL # (test code = BA#) 0.0 10\S\3/uL 0.0-0.3                 

  

 

             IG # (test code = IG#) 0.04 10\S\3/uL 0.00-0.06                 

 

             NRBC # (test code = NRBC#) 0.00 10\S\3/uL 0.00-0.01                

 

 

             NEUTROPH % (test code = NE%) 73.9 %       35.0-73.0    H           

 

 

             LYMPH % (test code = LY%) 18.0 %       20.0-55.0    L            

 

             MONO % (test code = MO%) 5.7 %        2.5-10.0                  

 

             EOSINOPH % (test code = EO%) 1.6 %        0.0-5.0                  

 

 

             BASOPHIL % (test code = BA%) 0.3 %        0.0-2.0                  

 

 

             IG % (test code = IG%) 0.5 %        0.0-0.8                   

 

             NRBC% (test code = NRBC%) 0.0 %        0.0-0.2                   

 

             MANDIFF (test code = MDIFF) NO           NO                        

 

             RBC MORPH (test code = RBCMOR)              NORMAL                 

   



CBC WITH [id number] 15:29:00





             Test Item    Value        Reference Range Interpretation Comments

 

             WBC (test code = WBC) 9.7 10\S\3/uL 4.5-11.0                  

 

             RBC (test code = RBC) 3.73 10\S\6/uL 4.20-5.60    L            

 

             HGB (test code = HBG) 11.0 g/dL    12.0-15.5    L            

 

             HCT (test code = HCT) 34.1 %       35.0-44.0    L            

 

             MCV (test code = MCV) 91.4 fL      81.0-99.0                 

 

             MCH (test code = MCH) 29.5 pg      27.0-31.0                 

 

             MCHC (test code = MCHC) 32.3 g/dL    32.0-36.0                 

 

             RDW (test code = RDW) 14.2 %       11.5-14.5                 

 

             PLT (test code = PLT) 116 10\S\3/uL 130-400      L            

 

             MPV (test code = MPV) 11.8 fL      9.4-12.4                  

 

             NEUTROP # (test code = NE#) 7.9 10\S\3/uL 1.6-8.0                  

 

 

             LYMPH # (test code = LY#) 1.0 10\S\3/uL 1.1-3.5      L            

 

             MONOCYTE # (test code = 0.6 10\S\3/uL 0.0-1.1                   



             MO#)                                                

 

             EOSINOPH # (test code = 0.1 10\S\3/uL 0.0-0.7                   



             EO#)                                                

 

             BASOPHIL # (test code = 0.0 10\S\3/uL 0.0-0.3                   



             BA#)                                                

 

             IG # (test code = IG#) 0.04 10\S\3/uL 0.00-0.06                 

 

             NRBC # (test code = NRBC#) 0.00 10\S\3/uL 0.00-0.01                

 

 

             NEUTROPH % (test code = 81.6 %       35.0-73.0    H            



             NE%)                                                

 

             LYMPH % (test code = LY%) 10.3 %       20.0-55.0    L            

 

             MONO % (test code = MO%) 6.4 %        2.5-10.0                  

 

             EOSINOPH % (test code = 1.0 %        0.0-5.0                   



             EO%)                                                

 

             BASOPHIL % (test code = 0.3 %        0.0-2.0                   



             BA%)                                                

 

             IG % (test code = IG%) 0.4 %        0.0-0.8                   

 

             NRBC% (test code = NRBC%) 0.0 %        0.0-0.2                   

 

             PLT EST (test code = ADEQUATE     ADEQUATE                  



             PLTEST)                                             

 

             PLT MORPH (test code = NORMAL (1.5-3 um) NORMAL                    



             PLTMOR)                                             



BASIC METABOLIC [id number] 15:26:00





             Test Item    Value        Reference Range Interpretation Comments

 

             GLUCOSE (test code = 06D) 118 mg/dL           H            

 

             SODIUM (test code = 01A) 136 mmol/L   136-145                   

 

             POTASSIUM (test code = 01B) 4.2 mmol/L   3.6-5.1                   

 

             CHLORIDE (test code = 04A) 106 mmol/L                       

 

             CO2 (test code = 02A) 24 mmol/L    22-32                     

 

             ANION GAP (test code = ANG) 10.2 mmol/L                            

 

             BUN (test code = 05D) 38 mg/dL     7-18         H            

 

             CREATININE (test code = 03E) 1.6 mg/dL    0.4-1.1      H           

 

 

             BUN/CREA (test code = BCR) 23           12-20        H            

 

             CALCIUM (test code = 09D) 9.2 mg/dL    8.3-9.5                   



CARDIAC [id number] 06:04:00





             Test Item    Value        Reference Range Interpretation Comments

 

             TROPONIN I (test code = A84) <0.015 ng/mL 0.000-0.045              

 



BASIC METABOLIC [id number] 05:52:00





             Test Item    Value        Reference Range Interpretation Comments

 

             GLUCOSE (test code = 06D) 171 mg/dL           H            

 

             SODIUM (test code = 01A) 138 mmol/L   136-145                   

 

             POTASSIUM (test code = 01B) 4.0 mmol/L   3.6-5.1                   

 

             CHLORIDE (test code = 04A) 107 mmol/L                       

 

             CO2 (test code = 02A) 23 mmol/L    22-32                     

 

             ANION GAP (test code = ANG) 12.0 mmol/L                            

 

             BUN (test code = 05D) 19 mg/dL     7-18         H            

 

             CREATININE (test code = 03E) 1.2 mg/dL    0.4-1.1      H           

 

 

             BUN/CREA (test code = BCR) 15           12-20                     

 

             CALCIUM (test code = 09D) 8.5 mg/dL    8.3-9.5                   



CBC (INCLUDES AUTOMATED DIFFERENTIAL)2019 05:30:00





             Test Item    Value        Reference Range Interpretation Comments

 

             WBC (test code = WBC) 14.0 10\S\3/uL 4.5-11.0     H            

 

             RBC (test code = RBC) 3.64 10\S\6/uL 4.20-5.60    L            

 

             HGB (test code = HBG) 10.8 g/dL    12.0-15.5    L            

 

             HCT (test code = HCT) 33.2 %       35.0-44.0    L            

 

             MCV (test code = MCV) 91.2 fL      81.0-99.0                 

 

             MCH (test code = MCH) 29.7 pg      27.0-31.0                 

 

             MCHC (test code = MCHC) 32.5 g/dL    32.0-36.0                 

 

             RDW (test code = RDW) 14.0 %       11.5-14.5                 

 

             PLT (test code = PLT) 143 10\S\3/uL 130-400                   

 

             MPV (test code = MPV) 11.4 fL      9.4-12.4                  

 

             NEUTROP # (test code = NE#) 12.1 10\S\3/uL 1.6-8.0      H          

  

 

             LYMPH # (test code = LY#) 0.9 10\S\3/uL 1.1-3.5      L            

 

             MONOCYTE # (test code = MO#) 0.9 10\S\3/uL 0.0-1.1                 

  

 

             EOSINOPH # (test code = EO#) 0.0 10\S\3/uL 0.0-0.7                 

  

 

             BASOPHIL # (test code = BA#) 0.0 10\S\3/uL 0.0-0.3                 

  

 

             IG # (test code = IG#) 0.10 10\S\3/uL 0.00-0.06    H            

 

             NRBC # (test code = NRBC#) 0.00 10\S\3/uL 0.00-0.01                

 

 

             NEUTROPH % (test code = NE%) 86.2 %       35.0-73.0    H           

 

 

             LYMPH % (test code = LY%) 6.3 %        20.0-55.0    L            

 

             MONO % (test code = MO%) 6.6 %        2.5-10.0                  

 

             EOSINOPH % (test code = EO%) 0.0 %        0.0-5.0                  

 

 

             BASOPHIL % (test code = BA%) 0.2 %        0.0-2.0                  

 

 

             IG % (test code = IG%) 0.7 %        0.0-0.8                   

 

             NRBC% (test code = NRBC%) 0.0 %        0.0-0.2                   

 

             MANDIFF (test code = MDIFF) NO           NO                        

 

             RBC MORPH (test code = RBCMOR)              NORMAL                 

   



CARDIAC [id number] 23:08:00





             Test Item    Value        Reference Range Interpretation Comments

 

             TROPONIN I (test code = A84) <0.015 ng/mL 0.000-0.045              

 



CT DISSECTION [id number] 19:26:35Exam: CT thorax PE protocol.Location: H
12History: [id number]: Chest painTechnique: Enhanced spiral slices were taken from
the apices of the lungs,through the upper abdomen utilizing a pulmonary embolism
protocol. Multiplanarreformations were performed. 100cc of Omnipaque were used. 
One or more of thefollowing radiation dose reduction techniques was used: 
automated exposurecontrol, adjustment of mA and/or KV according to patient size,
and/orutilization of iterative reconstruction technique.Findings:Nofilling 
defects are seen in the main pulmonary arteries. The pulmonaryvasculature is 
normal. No pulmonary venous congestion or arterial hypertensionis seen.The lungs
are clear. No infiltration or effusion is seen. No mass or nodule isseen.The 
mediastinum and the pulmonary kandis are normal. Calcified granulomas arenoted. No
lymphadenopathy is present. The heart size is normal.No pericardialeffusion is s
een.The visualized upper abdominal organs are unremarkable.Impression:1. 
Negative for pulmonary embolism.2. No acute disease.THYROID PANEL/SCREEN (TSH)
2019 18:29:00





             Test Item    Value        Reference Range Interpretation Comments

 

             TSH (test code = A57) 0.706 uIU/mL 0.358-3.740               



LIVER [id number] 18:28:00





             Test Item    Value        Reference Range Interpretation Comments

 

             BILI TOTAL (test code = 11A) 0.9 mg/dL    0.2-1.0                  

 

 

             BILI DIRCT (test code = 12A) 0.2 mg/dL    0.0-0.2                  

 

 

             BILI INDIR (test code = BILII) 0.7 mg/dL    <=0.8                  

   

 

             PROTEIN (test code = 07D) 7.7 g/dL     6.4-8.2                   

 

             ALBUMIN (test code = 08D) 3.8 g/dL     3.5-4.8                   

 

             GLOBULIN (test code = GLB) 3.9 g/dL     1.5-3.8      H            

 

             ALB/GLOB (test code = AGRR) 1.0          1.0-2.6                   

 

             ALK PHOS (test code = 35A) 106 IU/L                         

 

             AST (test code = 30A) 25 IU/L      <=42                      

 

             ALT (test code = 31A) 21 IU/L      <=78                      



BRAIN NATRIURETIC [id number] 18:19:00





             Test Item    Value        Reference Range Interpretation Comments

 

             proBNP (test code = PBNP) 518 pg/mL    0-125        H            



[id number] 18:15:00





             Test Item    Value        Reference Range Interpretation Comments

 

             MAGNESIUM (test code = 48A) 1.9 mg/dL    1.8-2.4                   



[id number] 17:40:00





             Test Item    Value        Reference Range Interpretation Comments

 

             D-DIMER (test code = <200 ng/mL D-DU 0-234                     



             DDI)                                                

 

             D-DIMER COMMENT (test *Level to rule out                           



             code = DDCOM) DVT or PE: <235 ng/mL                           



                          D-DU*                                  



CARDIAC [id number] 17:31:00





             Test Item    Value        Reference Range Interpretation Comments

 

             TROPONIN I (test code = A84) <0.015 ng/mL 0.000-0.045              

 



BASIC METABOLIC [id number] 17:27:00





             Test Item    Value        Reference Range Interpretation Comments

 

             GLUCOSE (test code = 06D) 114 mg/dL           H            

 

             SODIUM (test code = 01A) 142 mmol/L   136-145                   

 

             POTASSIUM (test code = 01B) 4.0 mmol/L   3.6-5.1                   

 

             CHLORIDE (test code = 04A) 111 mmol/L          H            

 

             CO2 (test code = 02A) 26 mmol/L    22-32                     

 

             ANION GAP (test code = ANG) 9.0 mmol/L                             

 

             BUN (test code = 05D) 19 mg/dL     7-18         H            

 

             CREATININE (test code = 03E) 1.2 mg/dL    0.4-1.1      H           

 

 

             BUN/CREA (test code = BCR) 15           12-20                     

 

             CALCIUM (test code = 09D) 9.2 mg/dL    8.3-9.5                   



CBC (INCLUDES AUTOMATED DIFFERENTIAL)2019 17:26:00





             Test Item    Value        Reference Range Interpretation Comments

 

             WBC (test code = WBC) 12.6 10\S\3/uL 4.5-11.0     H            

 

             RBC (test code = RBC) 4.04 10\S\6/uL 4.20-5.60    L            

 

             HGB (test code = HBG) 11.9 g/dL    12.0-15.5    L            

 

             HCT (test code = HCT) 37.6 %       35.0-44.0                 

 

             MCV (test code = MCV) 93.1 fL      81.0-99.0                 

 

             MCH (test code = MCH) 29.5 pg      27.0-31.0                 

 

             MCHC (test code = MCHC) 31.6 g/dL    32.0-36.0    L            

 

             RDW (test code = RDW) 13.8 %       11.5-14.5                 

 

             PLT (test code = PLT) 152 10\S\3/uL 130-400                   

 

             MPV (test code = MPV) 11.3 fL      9.4-12.4                  

 

             NEUTROP # (test code = NE#) 10.7 10\S\3/uL 1.6-8.0      H          

  

 

             LYMPH # (test code = LY#) 1.1 10\S\3/uL 1.1-3.5                   

 

             MONOCYTE # (test code = MO#) 0.6 10\S\3/uL 0.0-1.1                 

  

 

             EOSINOPH # (test code = EO#) 0.1 10\S\3/uL 0.0-0.7                 

  

 

             BASOPHIL # (test code = BA#) 0.0 10\S\3/uL 0.0-0.3                 

  

 

             IG # (test code = IG#) 0.06 10\S\3/uL 0.00-0.06                 

 

             NRBC # (test code = NRBC#) 0.00 10\S\3/uL 0.00-0.01                

 

 

             NEUTROPH % (test code = NE%) 85.2 %       35.0-73.0    H           

 

 

             LYMPH % (test code = LY%) 8.4 %        20.0-55.0    L            

 

             MONO % (test code = MO%) 5.0 %        2.5-10.0                  

 

             EOSINOPH % (test code = EO%) 0.6 %        0.0-5.0                  

 

 

             BASOPHIL % (test code = BA%) 0.3 %        0.0-2.0                  

 

 

             IG % (test code = IG%) 0.5 %        0.0-0.8                   

 

             NRBC% (test code = NRBC%) 0.0 %        0.0-0.2                   

 

             MANDIFF (test code = MDIFF) NO           NO                        



PTT (PARTIAL THROMBOPLASTIN TIME)2019 17:26:00





             Test Item    Value        Reference Range Interpretation Comments

 

             PTT (test code = 31.9 s       20.2-38.0                 



             PTT)                                                

 

             PTTH (test code = **** To monitor the                           



             PTTH)        effectiveness of heparin,                           



                          we offer the Anti-Xa                           



                          (Heparin Assay). It can be                           



                          used for either                           



                          unfractionated or LMW                           



                          Heparin. Order Code is                           



                          ANTI-XA ****                           



PROTHROMBIN [id number] 17:26:00





             Test Item    Value        Reference Range Interpretation Comments

 

             PT (test code = 12.0 s       9.8-13.6                  



             TT)                                                 

 

             INR (test code = 1.1                                    



             INR)                                                

 

             INRH (test code =  **** SUGGESTED THERAPEUTIC                      

     



             INRH)        RANGE FOR INR: **** 2.5 -                           



                          3.5 ** For Patients with                           



                          Prosthetic Valves or                           



                          Patients with recurrent                           



                          Thromboembolic Events **                           



                          2.0 - 3.0 ** For Most Other                           



                          Applications **                           



XR CHEST 1 VIEW [id number] 17:04:28Portable AP chest, 1 viewLocation 
Code: [id number] HISTORY: Chest painCOMPARISON: NoneCOMMENT: Mild atelectasis is
present within the lung bases. The costophrenic angles aresharp. The 
cardiomediastinalsilhouette is unremarkable. The bones are intact.IMPRESSION: 
Mild bibasilar atelectasis. Otherwise, no acute abnormalityCHEM [id number] 
16:51:00





             Test Item    Value        Reference Range Interpretation Comments

 

             Potassium Lvl (test code = Potassium 4.3          3.5-5.1          

         



             Lvl)                                                



[facility]2017-01-24 16:51:00





             Test Item    Value        Reference Range Interpretation Comments

 

             Sodium Lvl (test code = Sodium Lvl) 144          135-145           

        



[facility]2017-01-24 16:51:00





             Test Item    Value        Reference Range Interpretation Comments

 

             CO2 (test code = CO2) 28           24-32                     



[id number]





             Test Item    Value        Reference Range Interpretation Comments

 

             Calcium Lvl (test code = Calcium Lvl) 8.4          8.5-10.5        

          



[facility]2017-01-24 16:51:00





             Test Item    Value        Reference Range Interpretation Comments

 

             AGAP (test code = AGAP) 11.3         10.0-20.0                 



[facility]2017-01-24 16:51:00





             Test Item    Value        Reference Range Interpretation Comments

 

             Glucose Lvl (test code = Glucose Lvl) 109          70-99           

          



[facility]2017-01-24 16:51:00





             Test Item    Value        Reference Range Interpretation Comments

 

             eGFR (test code = eGFR) 52                                     



[facility]2017-01-24 16:51:00





             Test Item    Value        Reference Range Interpretation Comments

 

             Creatinine Lvl (test code = Creatinine 1.15         0.50-1.40      

           



             Lvl)                                                



[facility]2017-01-24 16:51:00





             Test Item    Value        Reference Range Interpretation Comments

 

             BUN (test code = BUN) 16           -                      



[facility]2017-01-24 16:51:00





             Test Item    Value        Reference Range Interpretation Comments

 

             Chloride Lvl (test code = Chloride Lvl) 109                  

            



[facility]2017-01-24 16:51:00





             Test Item    Value        Reference Range Interpretation Comments

 

             Potassium Lvl (test code = Potassium 4.3          3.5-5.1          

         



             Lvl)                                                



[facility]2017-01-24 16:51:00





             Test Item    Value        Reference Range Interpretation Comments

 

             Sodium Lvl (test code = Sodium Lvl) 144          135-145           

        



[facility]2017-01-24 16:51:00





             Test Item    Value        Reference Range Interpretation Comments

 

             CO2 (test code = CO2) 28           24-32                     



[facility]2017-01-24 16:51:00





             Test Item    Value        Reference Range Interpretation Comments

 

             Calcium Lvl (test code = Calcium Lvl) 8.4          8.5-10.5        

          



[facility]2017-01-24 16:51:00





             Test Item    Value        Reference Range Interpretation Comments

 

             AGAP (test code = AGAP) 11.3         10.0-20.0                 



[facility]2017-01-24 16:51:00





             Test Item    Value        Reference Range Interpretation Comments

 

             Glucose Lvl (test code = Glucose Lvl) 109          70-99           

          



[facility]2017-01-24 16:51:00





             Test Item    Value        Reference Range Interpretation Comments

 

             eGFR (test code = eGFR) 52                                     



[facility]2017-01-24 16:51:00





             Test Item    Value        Reference Range Interpretation Comments

 

             Creatinine Lvl (test code = Creatinine 1.15         0.50-1.40      

           



             Lvl)                                                



[facility]2017-01-24 16:51:00





             Test Item    Value        Reference Range Interpretation Comments

 

             BUN (test code = BUN) 16                                 



[facility]2017-01-24 16:51:00





             Test Item    Value        Reference Range Interpretation Comments

 

             Chloride Lvl (test code = Chloride Lvl) 109                  

            



[facility]2017-01-24 16:51:00





             Test Item    Value        Reference Range Interpretation Comments

 

             Potassium Lvl (test code = Potassium 4.3          3.5-5.1          

         



             Lvl)                                                



[facility]2017-01-24 16:51:00





             Test Item    Value        Reference Range Interpretation Comments

 

             Sodium Lvl (test code = Sodium Lvl) 144          135-145           

        



[facility]2017-01-24 16:51:00





             Test Item    Value        Reference Range Interpretation Comments

 

             CO2 (test code = CO2) 28           24-32                     



[facility]2017-01-24 16:51:00





             Test Item    Value        Reference Range Interpretation Comments

 

             Calcium Lvl (test code = Calcium Lvl) 8.4          8.5-10.5        

          



[facility]2017-01-24 16:51:00





             Test Item    Value        Reference Range Interpretation Comments

 

             AGAP (test code = AGAP) 11.3         10.0-20.0                 



[facility]2017-01-24 16:51:00





             Test Item    Value        Reference Range Interpretation Comments

 

             Glucose Lvl (test code = Glucose Lvl) 109          70-99           

          



[facility]2017-01-24 16:51:00





             Test Item    Value        Reference Range Interpretation Comments

 

             eGFR (test code = eGFR) 52                                     



[facility]2017-01-24 16:51:00





             Test Item    Value        Reference Range Interpretation Comments

 

             Creatinine Lvl (test code = Creatinine 1.15         0.50-1.40      

           



             Lvl)                                                



[facility]2017-01-24 16:51:00





             Test Item    Value        Reference Range Interpretation Comments

 

             BUN (test code = BUN) 16           -                      



[facility]2017-01-24 16:51:00





             Test Item    Value        Reference Range Interpretation Comments

 

             Chloride Lvl (test code = Chloride Lvl) 109                  

            



[facility]2017-01-24 16:51:00





             Test Item    Value        Reference Range Interpretation Comments

 

             Potassium Lvl (test code = Potassium 4.3          3.5-5.1          

         



             Lvl)                                                



[facility]2017-01-24 16:51:00





             Test Item    Value        Reference Range Interpretation Comments

 

             Sodium Lvl (test code = Sodium Lvl) 144          135-145           

        



[facility]2017-01-24 16:51:00





             Test Item    Value        Reference Range Interpretation Comments

 

             CO2 (test code = CO2) 28           -32                     



[facility]2017-01-24 16:51:00





             Test Item    Value        Reference Range Interpretation Comments

 

             Calcium Lvl (test code = Calcium Lvl) 8.4          8.5-10.5        

          



[facility]2017-01-24 16:51:00





             Test Item    Value        Reference Range Interpretation Comments

 

             AGAP (test code = AGAP) 11.3         10.0-20.0                 



[facility]2017-01-24 16:51:00





             Test Item    Value        Reference Range Interpretation Comments

 

             Glucose Lvl (test code = Glucose Lvl) 109          70-99           

          



[facility]2017-01-24 16:51:00





             Test Item    Value        Reference Range Interpretation Comments

 

             eGFR (test code = eGFR) 52                                     



[facility]2017-01-24 16:51:00





             Test Item    Value        Reference Range Interpretation Comments

 

             Creatinine Lvl (test code = Creatinine 1.15         0.50-1.40      

           



             Lvl)                                                



[facility]2017-01-24 16:51:00





             Test Item    Value        Reference Range Interpretation Comments

 

             BUN (test code = BUN) 16           -                      



[facility]2017-01-24 16:51:00





             Test Item    Value        Reference Range Interpretation Comments

 

             Chloride Lvl (test code = Chloride Lvl) 109                  

            



[facility]2017-01-24 16:51:00





             Test Item    Value        Reference Range Interpretation Comments

 

             Potassium Lvl (test code = Potassium 4.3          3.5-5.1          

         



             Lvl)                                                



[facility]2017-01-24 16:51:00





             Test Item    Value        Reference Range Interpretation Comments

 

             Sodium Lvl (test code = Sodium Lvl) 144          135-145           

        



[facility]2017-01-24 16:51:00





             Test Item    Value        Reference Range Interpretation Comments

 

             CO2 (test code = CO2) 28           -                     



[facility]2017-01-24 16:51:00





             Test Item    Value        Reference Range Interpretation Comments

 

             Calcium Lvl (test code = Calcium Lvl) 8.4          8.5-10.5        

          



[facility]2017-01-24 16:51:00





             Test Item    Value        Reference Range Interpretation Comments

 

             AGAP (test code = AGAP) 11.3         10.0-20.0                 



[facility]2017-01-24 16:51:00





             Test Item    Value        Reference Range Interpretation Comments

 

             Glucose Lvl (test code = Glucose Lvl) 109          70-99           

          



[facility]2017-01-24 16:51:00





             Test Item    Value        Reference Range Interpretation Comments

 

             eGFR (test code = eGFR) 52                                     



[facility]2017-01-24 16:51:00





             Test Item    Value        Reference Range Interpretation Comments

 

             Creatinine Lvl (test code = Creatinine 1.15         0.50-1.40      

           



             Lvl)                                                



[facility]2017-01-24 16:51:00





             Test Item    Value        Reference Range Interpretation Comments

 

             BUN (test code = BUN) 16           -                      



[facility]2017-01-24 16:51:00





             Test Item    Value        Reference Range Interpretation Comments

 

             Chloride Lvl (test code = Chloride Lvl) 109                  

            



[facility]2017-01-24 16:51:00





             Test Item    Value        Reference Range Interpretation Comments

 

             Potassium Lvl (test code = Potassium 4.3          3.5-5.1          

         



             Lvl)                                                



[facility]2017-01-24 16:51:00





             Test Item    Value        Reference Range Interpretation Comments

 

             Sodium Lvl (test code = Sodium Lvl) 144          135-145           

        



[facility]2017-01-24 16:51:00





             Test Item    Value        Reference Range Interpretation Comments

 

             CO2 (test code = CO2) 28           -                     



[facility]2017-01-24 16:51:00





             Test Item    Value        Reference Range Interpretation Comments

 

             Calcium Lvl (test code = Calcium Lvl) 8.4          8.5-10.5        

          



[facility]2017-01-24 16:51:00





             Test Item    Value        Reference Range Interpretation Comments

 

             AGAP (test code = AGAP) 11.3         10.0-20.0                 



[facility]2017-01-24 16:51:00





             Test Item    Value        Reference Range Interpretation Comments

 

             Glucose Lvl (test code = Glucose Lvl) 109          70-99           

          



[facility]2017-01-24 16:51:00





             Test Item    Value        Reference Range Interpretation Comments

 

             eGFR (test code = eGFR) 52                                     



[facility]2017-01-24 16:51:00





             Test Item    Value        Reference Range Interpretation Comments

 

             Creatinine Lvl (test code = Creatinine 1.15         0.50-1.40      

           



             Lvl)                                                



[facility]2017-01-24 16:51:00





             Test Item    Value        Reference Range Interpretation Comments

 

             BUN (test code = BUN) 16           -                      



[facility]2017-01-24 16:51:00





             Test Item    Value        Reference Range Interpretation Comments

 

             Chloride Lvl (test code = Chloride Lvl) 109                  

            



[facility]2017-01-24 16:51:00





             Test Item    Value        Reference Range Interpretation Comments

 

             Potassium Lvl (test code = Potassium 4.3          3.5-5.1          

         



             Lvl)                                                



[facility]2017-01-24 16:51:00





             Test Item    Value        Reference Range Interpretation Comments

 

             Sodium Lvl (test code = Sodium Lvl) 144          135-145           

        



[facility]2017-01-24 16:51:00





             Test Item    Value        Reference Range Interpretation Comments

 

             CO2 (test code = CO2) 28           -32                     



[facility]2017-01-24 16:51:00





             Test Item    Value        Reference Range Interpretation Comments

 

             Calcium Lvl (test code = Calcium Lvl) 8.4          8.5-10.5        

          



[facility]2017-01-24 16:51:00





             Test Item    Value        Reference Range Interpretation Comments

 

             AGAP (test code = AGAP) 11.3         10.0-20.0                 



[facility]2017-01-24 16:51:00





             Test Item    Value        Reference Range Interpretation Comments

 

             Glucose Lvl (test code = Glucose Lvl) 109          70-99           

          



[facility]2017-01-24 16:51:00





             Test Item    Value        Reference Range Interpretation Comments

 

             eGFR (test code = eGFR) 52                                     



[facility]2017-01-24 16:51:00





             Test Item    Value        Reference Range Interpretation Comments

 

             Creatinine Lvl (test code = Creatinine 1.15         0.50-1.40      

           



             Lvl)                                                



[facility]2017-01-24 16:51:00





             Test Item    Value        Reference Range Interpretation Comments

 

             BUN (test code = BUN) 16                                 



[facility]2017-01-24 16:51:00





             Test Item    Value        Reference Range Interpretation Comments

 

             Chloride Lvl (test code = Chloride Lvl) 109                  

            



[facility]2017-01-24 16:51:00





             Test Item    Value        Reference Range Interpretation Comments

 

             Potassium Lvl (test code = Potassium 4.3          3.5-5.1          

         



             Lvl)                                                



[facility]2017-01-24 16:51:00





             Test Item    Value        Reference Range Interpretation Comments

 

             Sodium Lvl (test code = Sodium Lvl) 144          135-145           

        



[facility]2017-01-24 16:51:00





             Test Item    Value        Reference Range Interpretation Comments

 

             CO2 (test code = CO2) 28                                



[facility]2017-01-24 16:51:00





             Test Item    Value        Reference Range Interpretation Comments

 

             Calcium Lvl (test code = Calcium Lvl) 8.4          8.5-10.5        

          



[facility]2017-01-24 16:51:00





             Test Item    Value        Reference Range Interpretation Comments

 

             AGAP (test code = AGAP) 11.3         10.0-20.0                 



[facility]2017-01-24 16:51:00





             Test Item    Value        Reference Range Interpretation Comments

 

             Glucose Lvl (test code = Glucose Lvl) 109          70-99           

          



[facility]2017-01-24 16:51:00





             Test Item    Value        Reference Range Interpretation Comments

 

             eGFR (test code = eGFR) 52                                     



[facility]2017-01-24 16:51:00





             Test Item    Value        Reference Range Interpretation Comments

 

             Creatinine Lvl (test code = Creatinine 1.15         0.50-1.40      

           



             Lvl)                                                



[facility]2017-01-24 16:51:00





             Test Item    Value        Reference Range Interpretation Comments

 

             BUN (test code = BUN) 16                                 



[facility]2017-01-24 16:51:00





             Test Item    Value        Reference Range Interpretation Comments

 

             Chloride Lvl (test code = Chloride Lvl) 109                  

            



[facility]

## 2023-01-10 NOTE — ER
Nurse's Notes                                                                                     

 [facility]                                                                 

Name: Juli Rushing                                                                             

Age: 67 yrs                                                                                       

Sex: Female                                                                                       

: 1955                                                                                   

MRN: [id number]                                                                                   

Arrival Date: 01/10/2023                                                                          

Time: 12:03                                                                                       

Account#: [id number]                                                                            

Bed 25                                                                                            

Private MD: Anthony Elkins                                                                         

Diagnosis: Respiratory insufficiency, hypercarbia                                                 

                                                                                                  

Presentation:                                                                                     

01/10                                                                                             

12:11 Chief complaint: Patient states: SOB, cough, weakness since she was last admitted. +    ll1 

      confusion and fatigue. No fever this week. Vaginal rash and infection is bad right now.     

      Wears oxygen at home, cant keep sats above 80%. Coronavirus screen: Vaccine status:         

      Patient reports being unvaccinated. Client denies travel out of the [place] in the last 14     

      days. congestion, cough unrelated to allergies, difficulty breathing, fatigue, Client       

      presents with at least one sign or symptom that may indicate coronavirus-19.                

      Standard/surgical mask placed on the client. Ebola Screen: Patient denies travel to an      

      Ebola-affected area in the 21 days before illness onset. Initial Sepsis Screen: Does        

      the patient meet any 2 criteria? No. Patient's initial sepsis screen is negative. Does      

      the patient have a suspected source of infection? No. Patient's initial sepsis screen       

      is negative. Risk Assessment: Do you want to hurt yourself or someone else? Patient         

      reports no desire to harm self or others. Onset of symptoms was 2023.           

12:11 Method Of Arrival: Wheelchair                                                           ll1 

12:11 Acuity: MIRTHA 2                                                                           ll1 

                                                                                                  

Triage Assessment:                                                                                

12:12 General: Appears uncomfortable, ill, Behavior is cooperative, appropriate for age.      ll1 

      Pain: Denies pain. Respiratory: Reports shortness of breath cough that is the patient       

      has moderate shortness of breath.                                                           

19:20 EENT: No deficits noted. No signs and/or symptoms were reported regarding the EENT      pf1 

      system. Respiratory: Onset: The symptoms/episode began/occurred today. GI: Abdomen is       

      round non-distended, Bowel sounds present X 4 quads. Reports diarrhea. : No deficits      

      noted. Derm: No signs and/or symptoms reported regarding the dermatologic system.           

      Musculoskeletal: No deficits noted.                                                         

19:20 Neuro: No deficits noted. Level of Consciousness is awake, alert, obeys commands,       pf1 

      Oriented to person, place, time. Cardiovascular: No deficits noted. Capillary refill <      

      3 seconds Patient's skin is warm and dry.                                                   

                                                                                                  

Historical:                                                                                       

- Allergies:                                                                                      

12:10 Bactrim;                                                                                ll1 

12:10 cefepime;                                                                               ll1 

12:10 Codeine;                                                                                ll1 

12:10 Levofloxacin;                                                                           ll1 

12:10 Morphine; itching;                                                                      ll1 

12:10 PENICILLINS;                                                                            ll1 

12:10 QUINOLONES;                                                                             ll1 

- Home Meds:                                                                                      

14:52 Alphagan P ophthalmic (eye) 1 drop twice a day [Active]; amiodarone 200 mg Oral tab 1   iw  

      tab 2 times per day [Active]; apixaban 2.5 mg Oral tab 1 tab 2 times per day [Active];      

      bumetanide 2 mg Oral tab 1 tab once daily [Active]; digoxin 125 mcg (0.125 mg) Oral tab     

      1 tab 3 X weekly [Active]; metoprolol tartrate 25 mg Oral tab 0.5 tab 2 times per day       

      [Active]; midodrine 5 mg Oral tab PRN with dialysis [Active]; midodrine 10 mg Oral tab      

      1 tab Q8H, don't take before dialysis, bring pills to traetment. [Active]; pantoprazole     

      40 mg Oral TbEC 1 tab once daily [Active];                                                  

- PMHx:                                                                                           

12:10 Hypertensive disorder; High Cholesterol; stage 4 kidney failure; Dialysis; Congestive   ll1 

      heart failure; Atrial fibrillation;                                                         

- PSHx:                                                                                           

12:10 back surgery; Dialysis catheter LEFT upper chest;                                       ll1 

                                                                                                  

- Immunization history:: Client reports receiving the 2nd dose of the Covid vaccine.              

- Social history:: Smoking status: Patient denies any tobacco usage or history of.                

                                                                                                  

                                                                                                  

Screenin:42 [facility] ED Fall Risk Assessment (Adult) History of falling in the last 3 months,       iw  

      including since admission Yes- single mechanical fall (1 pt). Abuse screen: Denies          

      threats or abuse. Denies injuries from another. Nutritional screening: No deficits          

      noted. Tuberculosis screening: No symptoms or risk factors identified.                      

                                                                                                  

Assessment:                                                                                       

13:41 General: Appears uncomfortable, ill, obese, Behavior is cooperative. Neuro: Drummond    iw  

      Agitation-Sedation Scale (RASS): -1 Drowsy Level of Consciousness is awake, Oriented to     

      person, place, time. Cardiovascular: Rhythm is. Respiratory: Airway is patent               

      Respiratory effort is labored, Breath sounds are diminished bilaterally. Derm: Skin is      

      intact, Skin is dusky, pale.                                                                

13:43 Reassessment: order for BIPAP, RT at bedside , family at bedside.                       iw  

14:52 Reassessment: pt remains drowsy but awakens easily to verbal stimuli, remains on Bipap, iw  

      100 %.                                                                                      

17:24 Reassessment: Patient appears in no apparent distress at this time. Patient and/or      iw  

      family updated on plan of care and expected duration. Pain level reassessed. pt ttok        

      BiPAP mask off, refuses to put it back on, symptoms seem to have improved, placed on 4      

      L NC, SpO2 at 97% Dr. Centeno notified , verbal order for repeat VBG.                         

19:30 General: Appears in no apparent distress. comfortable, obese, well developed, Behavior  pf1 

      is calm, cooperative, quiet, Patient sleeping at this time..                                

19:30 Pain: Complains of pain in patient C/O chronic back pain of 7. Neuro: No deficits       pf1 

      noted. Level of Consciousness is awake, alert, obeys commands, Oriented to person,          

      place, time. Neuro: Pupils are PERRLA, Pupil Size: 3mm. Cardiovascular: No deficits         

      noted. Capillary refill < 3 seconds Patient's skin is warm and dry. Rhythm is atrial        

      fibrillation. Respiratory: Airway is patent Trachea midline Respiratory effort is even,     

      unlabored, Respiratory pattern is regular, symmetrical, Breath sounds are diminished        

      bilaterally. GI: Reports diarrhea, Patient stated onset of diarrhea was today. : No       

      deficits noted. No signs and/or symptoms were reported regarding the genitourinary          

      system. EENT: No deficits noted. No signs and/or symptoms were reported regarding the       

      EENT system. Derm: Skin is intact, Skin is dusky, pale.                                     

                                                                                                  

Vital Signs:                                                                                      

12:11 BP 88 / 41; Pulse 66; Resp 20; Temp 97.3; Pulse Ox 100% on 4 lpm NC; Weight 108.86 kg;  ll1 

      Height 5 ft. 7 in. (170.18 cm); Pain 9/10;                                                  

12:18  / 46;                                                                            ll1 

14:52 BP 95 / 59; Pulse 74; Resp 20; Pulse Ox 100% on BiPAP;                                  iw  

17:26  / 52; Pulse 77; Resp 26; Pulse Ox 97% on 4 lpm NC;                               iw  

18:30  / 62; Pulse 77; Resp 22; Pulse Ox 97% on 3 lpm NC;                               iw  

19:30 BP 94 / 60; Pulse 76; Resp 22; Temp 98.3(O); Pulse Ox 100% on 3 lpm NC; Pain 7/10;      pf1 

12:11 Body Mass Index 37.59 (108.86 kg, 170.18 cm)                                            ll1 

                                                                                                  

ED Course:                                                                                        

12:03 Patient arrived in ED.                                                                  mr  

12:03 Anthony Elkins MD is Private Physician.                                                 mr  

12:14 Triage completed.                                                                       ll1 

12:58 Bry Centeno MD is Attending Physician.                                                sp3 

12:58 Arm band placed on Patient placed in an exam room, on a stretcher.                      ss  

13:09 Suzy Rajput, RN is Primary Nurse.                                                   iw  

13:24 XRAY Chest (1 view) In Process Unspecified.                                             EDMS

13:42 Missed attempt(s): 22 gauge in right forearm. Bleeding controlled, band aid applied,    iw  

      catheter tip intact.                                                                        

14:11 Anthony Elkins MD is Hospitalizing Provider.                                            sp3 

17:37 Inserted saline lock: 22 gauge in left antecubital area, using aseptic technique.       iw  

18:30 No provider procedures requiring assistance completed. Patient admitted, IV remains in  iw  

      place.                                                                                      

19:19 Primary Nurse role handed off by Suzy Rajput RN                                    mw2 

19:20 Patient has correct armband on for positive identification. Placed in gown. Bed in low  pf1 

      position. Call light in reach. Side rails up X2.                                            

20:09 Maribel tran, ELPIDIO is Primary Nurse.                                                    pf1 

                                                                                             

07:12 Primary Nurse role handed off by Maribel tran RN                                       

                                                                                                  

Administered Medications:                                                                         

No medications were administered                                                                  

                                                                                                  

                                                                                                  

Medication:                                                                                       

01/10                                                                                             

13:42 VIS not applicable for this client.                                                     iw  

                                                                                                  

Outcome:                                                                                          

14:12 Decision to Hospitalize by Provider.                                                    sp3 

                                                                                             

10:56 Patient left the ED.                                                                    ph  

                                                                                                  

Signatures:                                                                                       

Dispatcher [employer]                           EDMS                                                 

Amy Merrill                                                   

Johnathon, Eden                                 mr                                                   

Suzy Rajput, ELPIDIO MAGALLANES   iw                                                   

Lili Price RN RN                                                      

Niyah Mcguire RN                      RN                                                      

Rafal Bernard                                mw2                                                  

Alison Salazar RN                       RN   ll1                                                  

Bry Centeno MD MD   sp3                                                  

Maribel tran RN                      RN   pf1                                                  

                                                                                                  

Corrections: (The following items were deleted from the chart)                                    

01/10                                                                                             

12:59 12:14 Arm band placed on Patient placed in an exam room, on a stretcher, ll1              

14:53 14:52 Home Meds: midodrine Oral; iw                                                     iw  

                                                                                                  

**************************************************************************************************

## 2023-01-10 NOTE — RAD REPORT
EXAM DESCRIPTION:  RAD - Chest Single View - 1/10/2023 1:22 pm

 

CLINICAL HISTORY:  DYSPNEA

 

COMPARISON:  Portable January 4

 

TECHNIQUE:  AP portable chest image was obtained 1/10/2023 1:22 pm .

 

FINDINGS:  Lung volumes remain low. Significant elevation the right hemidiaphragm again noted. Double
-lumen dialysis catheter and loop recorder remain in place.

 

No new or progressive lung parenchymal process seen. Retrocardiac left base and posterior gutter righ
t base assessment is limited on portable imaging.

 

 Heart size is prominent but stable. No acute vascular engorgement.

 

 No measurable pleural effusion and no pneumothorax. No acute bony abnormality seen. No acute aortic 
findings suspected.

 

IMPRESSION:  No acute cardiopulmonary process.

 

No significant change from comparison study.

## 2023-01-10 NOTE — P.HP
Certification for Inpatient


Patient admitted to: Observation


With expected LOS: <2 Midnights


Patient will require the following post-hospital care: None


Practitioner: I am a practitioner with admitting privileges, knowledge of 

patient current condition, hospital course, and medical plan of care.


Services: Services provided to patient in accordance with Admission requirements

found in Title 42 Section 412.3 of the Code of Federal Regulations





Patient History


Date of Service: 01/10/23


Primary Care Provider: Brittni


Reason for admission: esrd, hypotension 


History of Present Illness: 





Patient is an office patient of mine.  She has a history of esrd, htn, atrial 

fib and chf.  She was at dialysis today.  Was found to be hypotensive.  The 

patient was very lethargic and sent to the hospital.  She is currently on  

abipap.  The patient is arousable.  However not able to give a good history.  

She has been getting progressively weaker over the past few months.  She has had

several hospitalizations. 


Allergies





cefepime Allergy (Verified 01/05/22 06:30)


   Hives


codeine Allergy (Verified 07/30/22 05:03)


   Itching


levofloxacin Allergy (Verified 01/05/22 06:30)


   Hives/Rash


morphine Allergy (Verified 07/30/22 05:03)


   Itching


Penicillins Allergy (Verified 01/05/22 06:30)


   Hives/Rash


sulfamethoxazole [From Bactrim] Allergy (Verified 07/30/22 05:03)


   Hives


trimethoprim [From Bactrim] Allergy (Verified 07/30/22 05:03)


   Hives





Home Medications: 








Apixaban [Eliquis] 2.5 mg PO BID 07/29/22 


Bumetanide 1 tab PO DAILY 07/29/22 


Midodrine HCl 1 tab PO TID 07/29/22 


Pantoprazole [Protonix Tab*] 1 tab PO ONCE 07/29/22 


Sevelamer Carbonate [Renvela] 800 mg PO TID 07/29/22 


Amiodarone HCl [Cordarone*] 200 mg PO BID 90 Days #180 tab 12/30/22 


Alendronate Sodium 70 mg PO 1X 01/04/23 


Gabapentin 300 mg PO DAILY 01/04/23 


Linaclotide [Linzess] 72 mcg PO DAILY AFTER SUPPER 90 Days #90 tab 01/05/23 


Promethazine Tab [Phenergan] 25 mg PO Q6HP PRN 30 Days #90 tab 01/05/23 








- Past Medical/Surgical History


Diabetic: No


-: Chronic hypotension


-: Hyperlipidemia


-: Chronic anticoagulation use


-: History of nephrolithiasis requiring stent placement


-: Recurrent UTI


-: End-stage renal disease


-: CHF


-: HTN


-: A-Fib


-: tubal ligation


-: dialysis port


-: cholecystectomy


-: right broken wrist


-: lasik


-: cataracts removed





- Family History


  ** Sister


-: Cancer


Notes: per pt, sister has lung cancer and is being treated for a "spot on her 

brain"





- Social History


Alcohol use: No


CD- Drugs: No


Caffeine use: Yes





Review of Systems


is unable to be obtained





Physical Examination





- Physical Exam


General: In no apparent distress, Confused


HEENT: Atraumatic, PERRLA, Mucous membr. moist/pink, EOMI, Sclerae nonicteric


Neck: Supple, 2+ carotid pulse no bruit, No LAD, Without JVD or thyroid 

abnormality


Respiratory: Clear to auscultation bilaterally, Normal air movement


Cardiovascular: Regular rate/rhythm, Normal S1 S2


Gastrointestinal: Normal bowel sounds, No tenderness


Musculoskeletal: No tenderness


Integumentary: No rashes


Neurological: Normal gait, Normal speech, Normal strength at 5/5 x4 extr, Normal

 tone, Normal affect


Lymphatics: No axilla or inguinal lymphadenopathy





- Studies


Laboratory Data (last 24 hrs)





01/10/23 14:38: PT 19.7 H, INR 1.79


01/10/23 14:38: Sodium 140, Potassium 3.3 L, BUN 25 H, Creatinine 4.48 H, Gluc

ose 108 H, Magnesium 2.1, Total Bilirubin 0.8, AST 5 L, ALT 12 L, Alkaline 

Phosphatase 134 H


01/10/23 13:47: WBC 10.60, Hgb 8.8 L, Hct 28.5 L, Plt Count 183








Assessment and Plan





- Problems (Diagnosis)


(1) CHF (congestive heart failure)


Current Visit: No   Status: Chronic   


Plan: 


Patient was hypotensive.  found to be hypercapnic in the er.  Will continue the 

bipap.  The patient has a normal chest xray.  No fluid overload 


Qualifiers: 


   Heart failure type: diastolic   Heart failure chronicity: chronic   Qualified

 Code(s): I50.32 - Chronic diastolic (congestive) heart failure   





(2) Atrial fibrillation


Current Visit: No   Status: Acute   


Plan: 


continue amiodarone and metoprolol.


Qualifiers: 


   Atrial fibrillation type: unspecified chronic   Qualified Code(s): I48.20 - 

Chronic atrial fibrillation, unspecified; I48.2 - Chronic atrial fibrillation   





(3) ESRD (end stage renal disease) on dialysis


Current Visit: No   Status: Chronic   


Plan: 


consult Dr. Medrano to continue her dialysis





Discharge Plan: Home





- Advance Directives


Does patient have a Living Will: No


Does patient have a Durable POA for Healthcare: No





- Code Status/Comfort Care


Code Status Assessed: Yes


Code Status: Full Code


Physician Review: Patient Assessed, Agree with Above Assessment and Plan


Critical Care: No


Time Spent Managing Pts Care (In Minutes): 45

## 2023-01-10 NOTE — EDPHYS
Physician Documentation                                                                           

 [facility]                                                                 

Name: Juli Rushing                                                                             

Age: 67 yrs                                                                                       

Sex: Female                                                                                       

: 1955                                                                                   

MRN: [id number]                                                                                   

Arrival Date: 01/10/2023                                                                          

Time: 12:03                                                                                       

Account#: [id number]                                                                            

Bed 25                                                                                            

Private MD: Anthony Elkins                                                                         

ED Physician Bry Centeno                                                                         

HPI:                                                                                              

01/10                                                                                             

13:22 This 67 yrs old Female presents to ER via Wheelchair with complaints of Breathing       sp3 

      Difficulty.                                                                                 

13:25 67-year-old female patient of Dr. Elkins with a history of hypertension, hyperlipidemia, sp3 

      stage IV kidney disease on dialysis is sent to the ED by her nephrologist for               

      hypotension, dyspnea, and high CO2 value at dialysis. Patient states that she has had       

      these symptoms many times before and looking at her recent history she has had multiple     

      admissions in the last 6 months for the same symptoms. She has a history of congestive      

      heart failure which despite being on dialysis she is unable to tolerate it for              

      diuresis. She denies any pain including headache, chest pain, back pain, syncope,           

      near-syncope, abdominal pain, nausea, vomiting, diarrhea, rash, or any other somatic        

      complaints on ROS at this time..                                                            

                                                                                                  

Historical:                                                                                       

- Allergies:                                                                                      

12:10 Bactrim;                                                                                ll1 

12:10 cefepime;                                                                               ll1 

12:10 Codeine;                                                                                ll1 

12:10 Levofloxacin;                                                                           ll1 

12:10 Morphine; itching;                                                                      ll1 

12:10 PENICILLINS;                                                                            ll1 

12:10 QUINOLONES;                                                                             ll1 

- Home Meds:                                                                                      

14:52 Alphagan P ophthalmic (eye) 1 drop twice a day [Active]; amiodarone 200 mg Oral tab 1   iw  

      tab 2 times per day [Active]; apixaban 2.5 mg Oral tab 1 tab 2 times per day [Active];      

      bumetanide 2 mg Oral tab 1 tab once daily [Active]; digoxin 125 mcg (0.125 mg) Oral tab     

      1 tab 3 X weekly [Active]; metoprolol tartrate 25 mg Oral tab 0.5 tab 2 times per day       

      [Active]; midodrine 5 mg Oral tab PRN with dialysis [Active]; midodrine 10 mg Oral tab      

      1 tab Q8H, don't take before dialysis, bring pills to traetment. [Active]; pantoprazole     

      40 mg Oral TbEC 1 tab once daily [Active];                                                  

- PMHx:                                                                                           

12:10 Hypertensive disorder; High Cholesterol; stage 4 kidney failure; Dialysis; Congestive   ll1 

      heart failure; Atrial fibrillation;                                                         

- PSHx:                                                                                           

12:10 back surgery; Dialysis catheter LEFT upper chest;                                       ll1 

                                                                                                  

- Immunization history:: Client reports receiving the 2nd dose of the Covid vaccine.              

- Social history:: Smoking status: Patient denies any tobacco usage or history of.                

                                                                                                  

                                                                                                  

ROS:                                                                                              

13:26 Constitutional: Negative for fever, chills, and weight loss, Eyes: Negative for injury, sp3 

      pain, redness, and discharge, Neck: Negative for injury, pain, and swelling,                

      Cardiovascular: Negative for chest pain, palpitations, and edema, Abdomen/GI: Negative      

      for abdominal pain, nausea, vomiting, diarrhea, and constipation, MS/Extremity:             

      Negative for injury and deformity, Skin: Negative for injury, rash, and discoloration,      

      Neuro: Negative for headache, weakness, numbness, tingling, and seizure.                    

13:26 All other systems are negative.                                                             

                                                                                                  

Exam:                                                                                             

13:26 Constitutional:  This is a well developed, well nourished patient who is awake, alert,  sp3 

      and in no acute distress. Head/Face:  Normocephalic, atraumatic. Eyes:  Pupils equal        

      round and reactive to light, extra-ocular motions intact.  Lids and lashes normal.          

      Conjunctiva and sclera are non-icteric and not injected.  Cornea within normal limits.      

      Periorbital areas with no swelling, redness, or edema. ENT:  Nares patent. No nasal         

      discharge, no septal abnormalities noted.  External auditory canals are clear.              

      Oropharynx with no redness, swelling, or masses, exudates, or evidence of obstruction,      

      uvula midline.  Mucous membranes moist. Chest/axilla:  Normal chest wall appearance and     

      motion.  Nontender with no deformity.  No lesions are appreciated. Abdomen/GI:  Soft,       

      non-tender, with normal bowel sounds.  No distension or tympany.  No guarding or            

      rebound.  No evidence of tenderness throughout. Skin:  Warm, dry with normal turgor.        

      Normal color with no rashes, no lesions, and no evidence of cellulitis. Neuro:  Awake       

      and alert, GCS 15, oriented to person, place, time, and situation.  Cranial nerves          

      II-XII grossly intact.  Motor strength 5/5 in all extremities.  Sensory grossly intact.     

       Cerebellar exam normal.  Normal gait.                                                      

13:26 Respiratory: Rales noted bilaterally..                                                      

13:26 Musculoskeletal/extremity: Peripheral edema noted in the lower extremities..                

13:27 Neuro: Patient is tired appearing but otherwise normal neurological exam with clear     sp3 

      understanding of questions and answering appropriately.  No focal neurodeficit noted..      

16:02 ECG was reviewed by the Attending Physician. EKG demonstrates atrial fibrillation 70    sp3 

      bpm with normal axis incomplete right bundle branch block, and nonspecific diffuse ST/T     

      changes without evidence of acute ischemia.                                                 

                                                                                                  

Vital Signs:                                                                                      

12:11 BP 88 / 41; Pulse 66; Resp 20; Temp 97.3; Pulse Ox 100% on 4 lpm NC; Weight 108.86 kg;  ll1 

      Height 5 ft. 7 in. (170.18 cm); Pain 9/10;                                                  

12:18  / 46;                                                                            ll1 

14:52 BP 95 / 59; Pulse 74; Resp 20; Pulse Ox 100% on BiPAP;                                  iw  

17:26  / 52; Pulse 77; Resp 26; Pulse Ox 97% on 4 lpm NC;                               iw  

18:30  / 62; Pulse 77; Resp 22; Pulse Ox 97% on 3 lpm NC;                               iw  

19:30 BP 94 / 60; Pulse 76; Resp 22; Temp 98.3(O); Pulse Ox 100% on 3 lpm NC; Pain 7/10;      pf1 

12:11 Body Mass Index 37.59 (108.86 kg, 170.18 cm)                                            ll1 

                                                                                                  

MDM:                                                                                              

13:14 Patient medically screened.                                                             sp3 

13:28 Data reviewed: vital signs, nurses notes, diagnostic data from outside facility,        sp3 

      radiologic studies, . ED course: Will assess, and get patient admitted to PCP with          

      cardiology and nephrology consults as requested by patient's nephrologist. Diagnosis        

      includes continued volume overload, congestive heart failure, ACS, pneumonia, infection     

      hypercarbia, among others. Patient meets full admission criteria and we will                

      disposition accordingly..                                                                   

13:55 ED course: Patient's VBG demonstrates hypercarbia at 65. BiPAP 12/5 is started.         sp3 

      Laboratory values and chest x-ray and then admit patient..                                  

16:03 Data reviewed: lab test result(s).                                                      sp3 

                                                                                                  

01/10                                                                                             

13:06 Order name: Basic Metabolic Panel; Complete Time: 16:01                                 sp3 

01/10                                                                                             

13:06 Order name: CBC with Diff                                                               sp3 

01/10                                                                                             

13:06 Order name: LFT's; Complete Time: 16:01                                                 sp3 

01/10                                                                                             

13:06 Order name: Magnesium; Complete Time: 16:01                                             sp3 

01/10                                                                                             

13:06 Order name: NT PRO-BNP; Complete Time: 16:01                                            sp3 

01/10                                                                                             

13:06 Order name: PT-INR; Complete Time: 16:01                                                3 

01/10                                                                                             

13:06 Order name: Troponin HS; Complete Time: 16:                                           3 

01/10                                                                                             

13:06 Order name: ABG: VBG!!!; Complete Time: 16:                                           sp3 

01/10                                                                                             

14:57 Order name: SARS RAPID                                                                  iw  

01/10                                                                                             

16:23 Order name: Manual Differential                                                         EDMS

01/10                                                                                             

17:36 Order name: ABG: VBG                                                                    sp3 

01/10                                                                                             

17:54 Order name: ABG Arterial Blood Gas                                                      EDMS

                                                                                             

02:52 Order name: CBC with Automated Diff                                                     EDMS

                                                                                             

03:34 Order name: Basic Metabolic Panel                                                       EDMS

01/10                                                                                             

13:06 Order name: XRAY Chest (1 view); Complete Time: 14:05                                   3 

01/10                                                                                             

13:06 Order name: EKG; Complete Time: 13:07                                                   3 

01/10                                                                                             

13:06 Order name: Cardiac monitoring; Complete Time: 14:53                                    3 

01/10                                                                                             

13:06 Order name: EKG - Nurse/Tech; Complete Time: 14:53                                      3 

01/10                                                                                             

13:06 Order name: IV Saline Lock; Complete Time: 13:51                                        3 

01/10                                                                                             

13:06 Order name: Labs collected and sent; Complete Time: 13:51                               3 

01/10                                                                                             

13:06 Order name: O2 Per Protocol; Complete Time: 13:52                                       3 

01/10                                                                                             

13:06 Order name: O2 Sat Monitoring; Complete Time: 13:52                                     3 

01/10                                                                                             

14:04 Order name: Labs - recollect needed: recollect blue and green tube, fill blue to the    bd  

      line; Complete Time: 14:51                                                                  

                                                                                             

08:38 Order name: Diet Renal; Complete Time: 08:39                                            ph  

                                                                                                  

Administered Medications:                                                                         

No medications were administered                                                                  

                                                                                                  

                                                                                                  

Disposition Summary:                                                                              

01/10/23 14:12                                                                                    

Hospitalization Ordered                                                                           

      Hospitalization Status: Inpatient Admission                                             sp3 

      Provider: Anthony Elkins                                                                 sp3 

      Condition: Fair                                                                         sp3 

      Problem: an acute exacerbation                                                          sp3 

      Symptoms: have worsened                                                                 sp3 

      Bed/Room Type: Standard                                                                 sp3 

      Location: [facility] ER HOLD(23 08:27)                                                  bd  

      Room Assignment: ERHOLD-(23 08:27)                                                bd  

      Diagnosis                                                                                   

        - Respiratory insufficiency, hypercarbia                                              sp3 

      Forms:                                                                                      

        - Medication Reconciliation Form                                                      sp3 

        - SBAR form                                                                           sp3 

Signatures:                                                                                       

Dispatcher [employer]                           EDMS                                                 

GarfieldAmy                              bd                                                   

Suzy Rajput RN RN                                                      

Vahid Brannon RN RN   ja1                                                  

Alison Salazar RN                       RN   casey1                                                  

Bry Centeno MD MD   sp3                                                  

                                                                                                  

Corrections: (The following items were deleted from the chart)                                    

14:53 14:52 Home Meds: midodrine Oral; [facility]  

17:52 14:12 Telemetry/MedSurg (Inpatient) sp3                                                 ja 

17:52 14:12 sp3                                                                               [facility] 

                                                                                             

07:53 01/10 17:52 [facility] ER HOLD [facility]                                                            bd  

                                                                                             

07:53 01/10 17:52 ERHOLD- [facility]                                                                 bd  

                                                                                             

08:27 07:53 Telemetry/MedSurg (Inpatient)                                                   bd  

08: 07:53 413                                                                             bd  

                                                                                                  

**************************************************************************************************

## 2023-01-11 VITALS — SYSTOLIC BLOOD PRESSURE: 96 MMHG | DIASTOLIC BLOOD PRESSURE: 81 MMHG | TEMPERATURE: 97.5 F

## 2023-01-11 VITALS — OXYGEN SATURATION: 98 %

## 2023-01-11 LAB
BUN BLD-MCNC: 33 MG/DL (ref 7–18)
GLUCOSE SERPLBLD-MCNC: 117 MG/DL (ref 74–106)
HCT VFR BLD CALC: 29.4 % (ref 36–45)
LYMPHOCYTES # SPEC AUTO: 1.2 K/UL (ref 0.7–4.9)
MCV RBC: 98.9 FL (ref 80–100)
PMV BLD: 7.9 FL (ref 7.6–11.3)
POTASSIUM SERPL-SCNC: 3.8 MMOL/L (ref 3.5–5.1)
RBC # BLD: 2.97 M/UL (ref 3.86–4.86)

## 2023-01-11 RX ADMIN — Medication SCH ML: at 08:46

## 2023-01-11 NOTE — P.DS
Admission Date: 01/10/23


Discharge Date: 01/11/23


Primary Care Provider: Brittni


Disposition: ROUTINE DISCHARGE


Discharge Condition: GOOD


Reason for Admission: esrd, hypotension 





- Problems


(1) CHF (congestive heart failure)


Current Visit: No   Status: Chronic   


Qualifiers: 


   Heart failure type: diastolic   Heart failure chronicity: chronic   Qualified

Code(s): I50.32 - Chronic diastolic (congestive) heart failure   





(2) Atrial fibrillation


Current Visit: No   Status: Acute   


Qualifiers: 


   Atrial fibrillation type: unspecified chronic   Qualified Code(s): I48.20 - 

Chronic atrial fibrillation, unspecified; I48.2 - Chronic atrial fibrillation   





(3) ESRD (end stage renal disease) on dialysis


Current Visit: No   Status: Chronic   





(4) Acute hemorrhoid


Current Visit: Yes   Status: Acute   


Brief History of Present Illness: 





Patient is an office patient of mine.  She has a history of esrd, htn, atrial 

fib and chf.  She was at dialysis today.  Was found to be hypotensive.  The 

patient was very lethargic and sent to the hospital.  She is currently on  

abipap.  The patient is arousable.  However not able to give a good history.  

She has been getting progressively weaker over the past few months.  She has had

several hospitalizations. 


Hospital Course: 





Patient came in with mental status changes.  She was placed on a bipap.  She 

recovered and took it off.  Patient is doing well this morning.  Per the nursing

staff she was awake playing games on her phone and eating throughout the 

evening. She is ready to go home.  Asked for some medication for a hemorrhoid.  

We can comply with this.  Thank you for allowing me to take part in her care. 


Vital Signs/Physical Exam: 














Temp Pulse Resp BP Pulse Ox


 


 98 F   8 L  20   95/65   100 


 


 01/11/23 04:00  01/11/23 06:44  01/11/23 06:44  01/11/23 06:44  01/11/23 06:44








General: Alert, In no apparent distress


HEENT: Atraumatic, PERRLA, EOMI


Neck: Supple, JVD not distended


Respiratory: Clear to auscultation bilaterally, Normal air movement


Cardiovascular: Regular rate/rhythm, Normal S1 S2


Gastrointestinal: Normal bowel sounds, No tenderness


Musculoskeletal: No tenderness


Integumentary: No rashes


Neurological: Normal speech, Normal tone, Normal affect


Lymphatics: No axilla or inguinal lymphadenopathy


Laboratory Data at Discharge: 














WBC  11.10 K/uL (4.3-10.9)  H  01/11/23  02:10    


 


Hgb  9.0 g/dL (12.0-15.0)  L  01/11/23  02:10    


 


Hct  29.4 % (36.0-45.0)  L  01/11/23  02:10    


 


Plt Count  183 K/uL (152-406)   01/11/23  02:10    


 


PT  19.7 SECONDS (9.5-12.5)  H  01/10/23  14:38    


 


INR  1.79   01/10/23  14:38    


 


Sodium  139 mmol/L (136-145)   01/11/23  02:10    


 


Potassium  3.8 mmol/L (3.5-5.1)  D 01/11/23  02:10    


 


BUN  33 mg/dL (7-18)  H  01/11/23  02:10    


 


Creatinine  5.18 mg/dL (0.55-1.02)  H*  01/11/23  02:10    


 


Glucose  117 mg/dL ()  H  01/11/23  02:10    


 


Magnesium  2.1 mg/dL (1.6-2.4)   01/10/23  14:38    


 


Total Bilirubin  0.8 mg/dL (0.2-1.0)   01/10/23  14:38    


 


AST  5 U/L (15-37)  L  01/10/23  14:38    


 


ALT  12 U/L (13-56)  L  01/10/23  14:38    


 


Alkaline Phosphatase  134 U/L ()  H  01/10/23  14:38    








Home Medications: 








Apixaban [Eliquis] 2.5 mg PO BID 07/29/22 


Bumetanide 1 tab PO DAILY 07/29/22 


Midodrine HCl 1 tab PO TID 07/29/22 


Pantoprazole [Protonix Tab*] 1 tab PO ONCE 07/29/22 


Sevelamer Carbonate [Renvela] 800 mg PO TID 07/29/22 


Amiodarone HCl [Cordarone*] 200 mg PO BID 90 Days #180 tab 12/30/22 


Alendronate Sodium 70 mg PO 1X 01/04/23 


Gabapentin 300 mg PO DAILY 01/04/23 


Linaclotide [Linzess] 72 mcg PO DAILY AFTER SUPPER 90 Days #90 tab 01/05/23 


Promethazine Tab [Phenergan] 25 mg PO Q6HP PRN 30 Days #90 tab 01/05/23 


Hydrocortisone/Pramoxine [Analpram Hc 2.5% Cream] 30 gm RC BID 5 Days #30 gm 

01/11/23 





New Medications: 


Hydrocortisone/Pramoxine [Analpram Hc 2.5% Cream] 30 gm RC BID 5 Days #30 gm


Diet: Renal


Activity: Ad susan


Followup: 


Anthony Elkins MD [Primary Care Provider] - 


Time spent managing pt's care (in minutes): 30

## 2023-01-11 NOTE — EKG
Test Date:    2023-01-10               Test Time:    14:49:30

Technician:   EDIS                                    

                                                     

MEASUREMENT RESULTS:                                       

Intervals:                                           

Rate:         69                                     

AL:                                                  

QRSD:         96                                     

QT:           596                                    

QTc:          638                                    

Axis:                                                

P:                                                   

AL:                                                  

QRS:          91                                     

T:            114                                    

                                                     

INTERPRETIVE STATEMENTS:                                       

                                                     

Atrial fibrillation

Rightward axis

Incomplete right bundle branch block

Nonspecific ST and T wave abnormality

Abnormal ECG

Compared to ECG 01/04/2023 10:43:14

Right-axis deviation now present

Incomplete right bundle-branch block now present

ST (T wave) deviation still present



Electronically Signed On 01-11-23 14:54:13 CST by Venancio Barajas

## 2023-01-11 NOTE — PN
Date of Progress Note:  01/11/2023



Subjective:  The patient was admitted with hypercapnic respiratory failure, placed on BiPAP.  The pat
ient recovered very well.  The patient feeling better.



Physical Examination:

Vital Signs:  Blood pressure 95/65, pulse of 81. 

Chest:  Clear to auscultation. 

Heart:  S1, S2.  Systolic murmur. 

Abdomen:  Soft, nontender. 

Extremity:  Trace edema. 

Neurologic:  Alert.  No focality.  No tremor.  Oriented x3.



Laboratory Data:  Sodium 139, potassium 3.8, bicarb 27, BUN 33, creatinine 5.1, calcium 9.  Hemoglobi
n 9.



Current Medications:  The patient on include;

1.Renvela.

2.Promethazine.

3.Midodrine before dialysis.

4.Bumex.

5.Eliquis.

6.Amiodarone.

7.Alendronate.



Assessment And Plan:  

1.End-stage renal disease.  Normal volume.  Continue dialysis TTS.  We will schedule for tomorrow.

2.Hypertension, currently on the lower side.  Keep holding all blood pressure medications.

3.Hyponatremia, dilutional.  Will be corrected with dialysis.

4.Hypokalemia.  No need for supplement.  We will dialyze on high potassium bath.

5.Congestive heart failure, currently normal volume.  We will arrange 

for dialysis tomorrow as outpatient.  The patient cleared from the Renal standpoint for discharge dre PURI

DD:  01/11/2023 11:21:20Voice ID:  [id number]

DT:  01/11/2023 11:48:48Report ID:  [id number]

## 2023-01-11 NOTE — CON
Date of Consultation:  01/11/2023



Reason For Consultation:  Elevated BUN and creatinine, fluid management.



History Of Present Illness:  This is a pleasant 67-year-old female, well known to me from dialysis wi
th significant past medical history of hypertension, hyperlipidemia, end-stage renal disease on hemod
ialysis Tuesday, Thursday, Saturday, nephrolithiasis.  The patient apparently went to the dialysis, i
n the dialysis found to have low blood pressure and shortness of breath with altered mental status.  
The patient found to have hypercapnic respiratory failure.  The patient placed on BiPAP and her condi
tion started being improved.  The patient had full dialysis in the unit.



Past Medical History:  Includes;

1.Nephrolithiasis.

2.Hypertension.

3.Hyperlipidemia.

4.End-stage renal disease.



Past Surgical History:  Includes urethral stent placement.



Family History:  Positive for hypertension.



Social History:  Denied smoking, denied drinking, denied drugs abuse.



Allergies:  TO CEFEPIME, LEVAQUIN, AND PENICILLIN.



Review of Systems:

Head and Neck:  No red eye.  No ear pain. 

GI:  No nausea.  No vomiting. 

:  No polyuria.  No dysuria.  No hematuria. 

Gyn:  No vaginal discharge. 

Respiratory:  Has shortness of breath. 

Cardiovascular:  No chest pain. 

Endocrine:  No polydipsia. 

Skin:  No rash. 

Neuro:  Has altered mental status. 

Musculoskeletal:  Generalized fatigue.



Physical Examination:

General:  When I saw the patient; the patient lying in bed. 

Vital Signs:  Blood pressure of 96/63, pulse of 85, afebrile. 

Chest:  Faint rales bilateral base. 

Heart:  S1, S2.  Regular. 

Abdomen:  Soft, nontender. 

Extremity:  Trace edema. 

Neuro:  Sleepy. No focality.  No tremor.



Laboratory Data:  Hemoglobin 8.8.  Sodium 140, potassium 3.3, bicarb 26, BUN 25, creatinine 4.4, calc
ium 8.7.



Home Medications:  Include;

1.Sevelamer.

2.Promethazine.

3.Midodrine.

4.Bumex.

5.Eliquis.

6.Amiodarone.

7.Alendronate.



Assessment And Plan:  

1.End-stage renal disease, stable.  Continue dialysis TTS.

2.Hyponatremia, going to be corrected with dialysis.

3.Hypokalemia.  We will dialyze on high potassium bath.

4.Hypertension, controlled, currently on the lower side.  Hold all blood pressure medications.

5.Hypercapnic respiratory failure.  Continue BiPAP.  The patient's BiPAP outpatient has been adjuste
d.

6.Diabetes as by primary.





MA/JUAREZ

DD:  01/11/2023 11:19:22Voice ID:  [id number]

DT:  01/11/2023 11:53:04Report ID:  [id number]

## 2023-01-14 ENCOUNTER — HOSPITAL ENCOUNTER (OUTPATIENT)
Dept: HOSPITAL 97 - ER | Age: 68
Setting detail: OBSERVATION
LOS: 1 days | Discharge: HOME | End: 2023-01-15
Attending: HOSPITALIST | Admitting: HOSPITALIST
Payer: COMMERCIAL

## 2023-01-14 VITALS — BODY MASS INDEX: 35.9 KG/M2

## 2023-01-14 VITALS — OXYGEN SATURATION: 97 %

## 2023-01-14 DIAGNOSIS — N28.9: ICD-10-CM

## 2023-01-14 DIAGNOSIS — Z88.0: ICD-10-CM

## 2023-01-14 DIAGNOSIS — I50.33: ICD-10-CM

## 2023-01-14 DIAGNOSIS — I10: ICD-10-CM

## 2023-01-14 DIAGNOSIS — R41.82: Primary | ICD-10-CM

## 2023-01-14 DIAGNOSIS — I48.91: ICD-10-CM

## 2023-01-14 DIAGNOSIS — R11.2: ICD-10-CM

## 2023-01-14 DIAGNOSIS — D64.9: ICD-10-CM

## 2023-01-14 DIAGNOSIS — I34.0: ICD-10-CM

## 2023-01-14 DIAGNOSIS — Z99.2: ICD-10-CM

## 2023-01-14 DIAGNOSIS — E78.5: ICD-10-CM

## 2023-01-14 DIAGNOSIS — R44.3: ICD-10-CM

## 2023-01-14 DIAGNOSIS — Z99.81: ICD-10-CM

## 2023-01-14 DIAGNOSIS — Z88.8: ICD-10-CM

## 2023-01-14 DIAGNOSIS — N18.6: ICD-10-CM

## 2023-01-14 DIAGNOSIS — Z88.2: ICD-10-CM

## 2023-01-14 DIAGNOSIS — I07.1: ICD-10-CM

## 2023-01-14 DIAGNOSIS — I95.3: ICD-10-CM

## 2023-01-14 DIAGNOSIS — D63.1: ICD-10-CM

## 2023-01-14 DIAGNOSIS — Z88.6: ICD-10-CM

## 2023-01-14 DIAGNOSIS — N25.0: ICD-10-CM

## 2023-01-14 DIAGNOSIS — N13.2: ICD-10-CM

## 2023-01-14 DIAGNOSIS — R10.9: ICD-10-CM

## 2023-01-14 LAB
ALBUMIN SERPL BCP-MCNC: 3.1 G/DL (ref 3.4–5)
ALBUMIN SERPL BCP-MCNC: 3.3 G/DL (ref 3.4–5)
ALP SERPL-CCNC: 154 U/L (ref 45–117)
ALP SERPL-CCNC: 160 U/L (ref 45–117)
ALT SERPL W P-5'-P-CCNC: 14 U/L (ref 13–56)
ALT SERPL W P-5'-P-CCNC: 14 U/L (ref 13–56)
AST SERPL W P-5'-P-CCNC: 13 U/L (ref 15–37)
AST SERPL W P-5'-P-CCNC: 9 U/L (ref 15–37)
BUN BLD-MCNC: 42 MG/DL (ref 7–18)
GLUCOSE SERPLBLD-MCNC: 133 MG/DL (ref 74–106)
HCT VFR BLD CALC: 28.5 % (ref 36–45)
INR BLD: 1.55
LIPASE SERPL-CCNC: 277 U/L (ref 73–393)
LYMPHOCYTES # SPEC AUTO: 1 K/UL (ref 0.7–4.9)
MAGNESIUM SERPL-MCNC: 2.1 MG/DL (ref 1.6–2.4)
MCV RBC: 101 FL (ref 80–100)
MORPHOLOGY BLD-IMP: (no result)
PMV BLD: 8.2 FL (ref 7.6–11.3)
POTASSIUM SERPL-SCNC: 3.9 MMOL/L (ref 3.5–5.1)
RBC # BLD: 2.82 M/UL (ref 3.86–4.86)
TROPONIN I SERPL HS-MCNC: 25.6 PG/ML (ref ?–58.9)
TSH SERPL DL<=0.05 MIU/L-ACNC: 4.63 UIU/ML (ref 0.36–3.74)

## 2023-01-14 PROCEDURE — 80048 BASIC METABOLIC PNL TOTAL CA: CPT

## 2023-01-14 PROCEDURE — 85025 COMPLETE CBC W/AUTO DIFF WBC: CPT

## 2023-01-14 PROCEDURE — 36415 COLL VENOUS BLD VENIPUNCTURE: CPT

## 2023-01-14 PROCEDURE — 80076 HEPATIC FUNCTION PANEL: CPT

## 2023-01-14 PROCEDURE — 84100 ASSAY OF PHOSPHORUS: CPT

## 2023-01-14 PROCEDURE — 83690 ASSAY OF LIPASE: CPT

## 2023-01-14 PROCEDURE — 87086 URINE CULTURE/COLONY COUNT: CPT

## 2023-01-14 PROCEDURE — 87040 BLOOD CULTURE FOR BACTERIA: CPT

## 2023-01-14 PROCEDURE — 85730 THROMBOPLASTIN TIME PARTIAL: CPT

## 2023-01-14 PROCEDURE — 84484 ASSAY OF TROPONIN QUANT: CPT

## 2023-01-14 PROCEDURE — 74176 CT ABD & PELVIS W/O CONTRAST: CPT

## 2023-01-14 PROCEDURE — 85610 PROTHROMBIN TIME: CPT

## 2023-01-14 PROCEDURE — 87205 SMEAR GRAM STAIN: CPT

## 2023-01-14 PROCEDURE — 83735 ASSAY OF MAGNESIUM: CPT

## 2023-01-14 PROCEDURE — 70450 CT HEAD/BRAIN W/O DYE: CPT

## 2023-01-14 PROCEDURE — 71045 X-RAY EXAM CHEST 1 VIEW: CPT

## 2023-01-14 PROCEDURE — 99285 EMERGENCY DEPT VISIT HI MDM: CPT

## 2023-01-14 PROCEDURE — 87088 URINE BACTERIA CULTURE: CPT

## 2023-01-14 PROCEDURE — 87811 SARS-COV-2 COVID19 W/OPTIC: CPT

## 2023-01-14 PROCEDURE — 84443 ASSAY THYROID STIM HORMONE: CPT

## 2023-01-14 PROCEDURE — 83880 ASSAY OF NATRIURETIC PEPTIDE: CPT

## 2023-01-14 PROCEDURE — 93005 ELECTROCARDIOGRAM TRACING: CPT

## 2023-01-14 PROCEDURE — 80061 LIPID PANEL: CPT

## 2023-01-14 PROCEDURE — 84439 ASSAY OF FREE THYROXINE: CPT

## 2023-01-14 RX ADMIN — Medication SCH ML: at 21:07

## 2023-01-14 RX ADMIN — APIXABAN SCH MG: 2.5 TABLET, FILM COATED ORAL at 21:07

## 2023-01-14 RX ADMIN — AMIODARONE HYDROCHLORIDE SCH MG: 200 TABLET ORAL at 21:07

## 2023-01-14 NOTE — XMS REPORT
Continuity of Care Document

                           Created on:2023



Patient:SUZI SEARS

Sex:Female

:1955

External Reference #:[id number]





Demographics







                          Address                   [address]



                                                    [place] [address]

 

                          Home Phone                [phone]

 

                          Work Phone                [phone]

 

                          Mobile Phone              [phone]

 

                          Email Address             [email or web address]

 

                          Preferred Language        English

 

                          Marital Status            Unknown

 

                          Yazidism Affiliation     Unknown

 

                          Race                      Unknown

 

                          Additional Race(s)        Unavailable



                                                    White

 

                          Ethnic Group              Unknown









Author







                          Organization              [facility]

t

 

                          Address                   [address]



                                                    [place] [address]

 

                          Phone                     [phone]









Support







                Name            Relationship    Address         Phone

 

                JEIMY SEARS EDUARDO SIMON               [address] [phone]

[phone]



                                                [place] [address] 

 

                JEIMY SEARS SPOU            [address] [phone]



                                                [place] [address] 

 

                JEIMY SEARS SPOU            [address]       [phone]

[phone]



                                                [place] [address] 

 

                CHITRA SEARS    SPOU            [address]       [phone]



                                                [place] [address] 

 

                Jeimy Sears Spouse          [phone]

[phone]



                                                [place] [address] 

 

                Jonah Sears   Child           Unavailable     [phone]

 

                Kyler Sears   Child           Unavailable     [phone]









Care Team Providers







                    Name                Role                Phone

 

                    JOSIAH COYNEVI MICHELLE  Primary Care Physician Unavailable

 

                    Kd Sylvester Attending Clinician Unavailable

 

                    Clark Cuellar   Attending Clinician Unavailable

 

                    Travis WIGGINS, Luma YANEZ   Attending Clinician [phone]

 

                    Joanna WIGGINS, Sanket Dukes Attending Clinician [phone]

 

                    Vinayak Broussard MD   Attending Clinician [phone]

 

                    VINAYAK BROUSSARD      Attending Clinician Unavailable

 

                    Gregoria Pulido MA   Attending Clinician Unavailable

 

                    Miriam WIGGINS, Suresh Landry Attending Clinician [phone]

 

                    Lane WIGGINS, Zhen Steward Attending Clinician [phone]

[phone]

 

                    Faye Mitchell MD    Attending Clinician [phone]

 

                    Trinidad WIGGINS, Kumar Valadez Attending Clinician [phone]

Toni Sparks MD, Sourav Gale Attending Clinician [phone]

 

                    Vadim WIGGINS, Daylin   Attending Clinician [phone]

 

                    Jacobo WIGGINS, Quinton Gonsalez Attending Clinician [phone]

[phone]

 

                    Jean-Claude WIGGINS, Martina  Attending Clinician [phone]

 

                    Louann Odom MA Attending Clinician Unavailable

 

                    Venancio Barajas      Attending Clinician Unavailable

 

                    Ceferino_OPAL         Attending Clinician Unavailable

 

                    Addison WIGGINS, Louann Carrizales Attending Clinician [phone]

 

                    Maria Dolores WIGGINS, Aki      Attending Clinician [phone]

 

                    Luc WIGGINS, Shelby Attending Clinician [phone]

 

                    Fantasma Squires       Attending Clinician [phone]

 

                    Charisse Coyne  Attending Clinician [phone]

 

                    Maureen WIGGINS, Martha Chu Attending Clinician [phone]

[phone]

 

                    Parveen Hutchinson MD Attending Clinician [phone]

 

                    Rosalio Angulo MD     Attending Clinician [phone]

 

                    Christine Saini DO Attending Clinician [phone]

 

                    Edinson WIGGINS, Trev Hays Attending Clinician [phone]

 

                    Hasmukh WIGGINS, Mariluz Sanchez Attending Clinician [phone]

 

                    Suzie Dominguez       Attending Clinician Unavailable

 

                    Hadley Medina         Attending Clinician Unavailable

 

                    Antionette MAGALLANES, Joana      Attending Clinician Unavailable

 

                    Nieves WIGGINS, Melissa PHAM Attending Clinician [phone]

[phone]

 

                    Lazaro WIGGINS, Naseem Attending Clinician [phone]

 

                    Elaina Clark MD Attending Clinician [phone]

 

                    Reynaldo WIGGINS, Brenna Attending Clinician [phone]

 

                    DEBBY_EVELIN_Alec_J      Attending Clinician Unavailable

 

                    MD MARTHA LAMAR Attending Clinician Unavailable

 

                    JULIANNA KEANE  Attending Clinician Unavailable

 

                    MAGY EARL     Attending Clinician Unavailable

 

                    MELVIN MANZO   Attending Clinician Unavailable

 

                    MD MELVIN MANZO Attending Clinician Unavailable

 

                    MD DAYLIN LITTLE OBIOMA Attending Clinician Unavailable

 

                    MD MELISSA PICKETT Attending Clinician Unavailable

 

                    Brenna Jacobs    Attending Clinician [phone]

 

                    BRENNA JACOBS    Attending Clinician Unavailable

 

                    Abdi Schulz Attending Clinician [phone]

 

                    Gabriela Rosenthal Attending Clinician [phone]

 

                    EMILIA BONILLA        Attending Clinician Unavailable

 

                    DR GOPAL ADEN    Attending Clinician Unavailable

 

                    Yousif Rhodes Attending Clinician [phone]

 

                    Randy Cunningham   Attending Clinician [phone]

 

                    Yoan Lei     Attending Clinician [phone]

 

                    Abbi Somers  Attending Clinician [phone]

 

                    Vignesh See Attending Clinician [phone]

 

                    Charisse Coyne  Admitting Clinician Unavailable

 

                    LUMA BECKHAM      Admitting Clinician Unavailable

 

                    ZHEN JENKINS     Admitting Clinician Unavailable

 

                    MARTINA DOUGLASS     Admitting Clinician Unavailable

 

                    Anthony Elkins     Admitting Clinician Unavailable

 

                    Lisag_B         Admitting Clinician Unavailable

 

                    AKI DUPREE         Admitting Clinician Unavailable

 

                    CHRISTINE SAIIN       Admitting Clinician Unavailable

 

                    MD ROSALIO ANGULO     Admitting Clinician Unavailable

 

                    MELISSA PICKETT     Admitting Clinician Unavailable

 

                    AMMON_Alec_EDUARDO      Admitting Clinician Unavailable

 

                    MD MARTHA LAMAR Admitting Clinician Unavailable

 

                    MELVIN MANZO   Admitting Clinician Unavailable

 

                    MD MELVIN MANZO Admitting Clinician Unavailable

 

                    DAYLIN LITTLE      Admitting Clinician Unavailable

 

                    MD KAUSHIK BECKHAM   Admitting Clinician Unavailable

 

                    MD MELISSA PICKETT Admitting Clinician Unavailable

 

                    DR GOPAL ADEN    Admitting Clinician Unavailable









Payers







           Payer Name Policy Type Policy Number Effective Date Expiration Date S ource MEDICARE [place]:            [id number] 2021            



           [employer]                       00:00:00              







Problems







       Condition Condition Condition Status Onset  Resolution Last   Treating Co

mments 

Source



       Name   Details Category        Date   Date   Treatment Clinician        



                                                 Date                 

 

       Hypotensio Hypotensio Disease Active 2022                             C

[facility]



       n      n                    [facility]-                               [facility]



                                   [facility]



                                   [facility]                                 [facility]

 

       Chest pain Chest pain Disease Active 2022                             M

ethodi



       with high with high               105                               st



       risk of risk of               [facility]:00:                             [facility]



       acute  acute                00                                 l



       coronary coronary                                                  



       syndrome syndrome                                                  

 

       Hyperkalem Hyperkalem Disease Active                              M

ethodi



       ia     ia                                                  st



                                   00:00:                             [facility]ospita



                                   00                                 l

 

       Fluid  Fluid  Disease Active                              Methodi



       overload overload                                              st



                                   00:00:                             Hospita



                                   00                                 l

 

       COVID-19 COVID-19 Disease Active                              [facility]

di



       virus  virus                                               st



       detected detected               00:00:                             Hospit

a



                                   00                                 l

 

       Acute back Acute back Disease Active                              M

ethodi



       pain   pain                                                st



                                   00:00:                             [facility]ospita



                                   00                                 l

 

       Sepsis Sepsis Disease Active                              Methodi



                                                                  st



                                   00:00:                             Hospita



                                   00                                 l

 

       ARF (acute ARF (acute Disease Active                              M

ethodi



       renal  renal                                               st



       failure) failure)               00:00:                             Hospit

a



                                   00                                 l

 

       Hypoxemia Hypoxemia Disease Active                              Met

hodi



                                                                  st



                                   00:00:                             Hospita



                                   00                                 l

 

       Pulmonary Pulmonary Disease Active                              Met

hodi



       edema  edema                                               st



                                   00:00:                             Hospita



                                   00                                 l

 

       UTI    UTI    Disease Active                              Methodi



       (urinary (urinary                                              st



       tract  tract                00:00:                             Hospita



       infection) infection)               00                                 l

 

       Shortness Shortness Disease Active                              Met

hodi



       of breath of breath                                              st



                                   00:00:                             Hospita



                                   00                                 l

 

       Hyperlipid Hyperlipid Disease Active                       Overview

: Methodi



       emia   emia                                         Formattin st



                                   00:00:                      g of this Hospita



                                   00                          note   l



                                                               might be 



                                                               different 



                                                               from the 



                                                               original. 



                                                               Data   



                                                               migrated 



                                                               from [employer] 



                                                               [employer] 



                                                               y on   



                                                               5/30/15.D 



                                                               nancie    



                                                               migrated 



                                                               from [employer] 



                                                               [employer] 



                                                               y on   



                                                               5/30/15.D 



                                                               nancie    



                                                               migrated 



                                                               from [employer] 



                                                               [employer] 



                                                               y on   



                                                               5/30/15.D 



                                                               nancie    



                                                               migrated 



                                                               from [employer] 



                                                               [employer] 



                                                               y on   



                                                               5/30/15.D 



                                                               nancie    



                                                               migrated 



                                                               from [employer] 



                                                               [employer] 



                                                               y on   



                                                               5/30/15. 

 

       Hyperparat Hyperparat Disease Active                              M

ethodi



       hyroidism hyroidism                                              st



                                   00:00:                             Hospita



                                   00                                 l

 

       Hyperurice Hyperurice Disease Active                       Overview

: Methodi



       keegan    keegan                                          Formattin st



                                   00:00:                      g of this Hospita



                                   00                          note   l



                                                               might be 



                                                               different 



                                                               from the 



                                                               original. 



                                                               Data   



                                                               migrated 



                                                               from [employer] 



                                                               [employer] 



                                                               y on   



                                                               5/30/15.D 



                                                               nancie    



                                                               migrated 



                                                               from [employer] 



                                                               [employer] 



                                                               y on   



                                                               5/30/15.D 



                                                               nancie    



                                                               migrated 



                                                               from [employer] 



                                                               [employer] 



                                                               y on   



                                                               5/30/15.D 



                                                               nancie    



                                                               migrated 



                                                               from [employer] 



                                                               [employer] 



                                                               y on   



                                                               5/30/15.D 



                                                               nancie    



                                                               migrated 



                                                               from [employer] 



                                                               [employer] 



                                                               y on   



                                                               5/30/15. 

 

       Abnormal Abnormal Disease Active                              [facility]

di



       urinalysis urinalysis               



                                   00:00:                             Hospita



                                   00                                 l

 

       Abnormal Abnormal Disease Active                              [facility]

di



       gait   gait                 



                                   00:00:                             Hospita



                                   00                                 l

 

       Ankle  Ankle  Disease Active                              Methodi



       swelling swelling               



                                   00:00:                             Hospita



                                   00                                 l

 

       Atrial Atrial Disease Active                              Methodi



       fibrillati fibrillati               



       on     on                   00:00:                             Hospita



                                   00                                 l

 

       Chronic Chronic Disease Active                              Methodi



       venous venous               



       stasis stasis               00:00:                             Hospita



                                   00                                 l

 

       Venous Venous Disease Active                              Methodi



       stasis stasis               



       ulcer of ulcer of               00:00:                             Hospit

a



       lower  lower                00                                 l



       extremity extremity                                                  

 

       Weight Weight Disease Active                              Methodi



       gain   gain                 



                                   00:00:                             Hospita



                                   00                                 l

 

       Pain in Pain in Disease Active                              Methodi



       wrist  wrist                



                                   00:00:                             Hospita



                                   00                                 l

 

       Prerenal Prerenal Disease Active                              [facility]

di



       azotemia azotemia               



                                   00:00:                             Hospita



                                   00                                 l

 

       Proteinuri Proteinuri Disease Active                              M

ethodi



       a      a                    



                                   00:00:                             Hospita



                                   00                                 l

 

       Peripheral Peripheral Disease Active                              M

ethodi



       venous venous               



       insufficie insufficie               00:00:                             Ho

spita



       ncy    ncy                  00                                 l

 

       Malignant Malignant Disease Active                              Met

hodi



       neoplasm neoplasm               



       of skin of of skin of               00:00:                             Ho

spita



       upper  upper                00                                 l



       extremity extremity                                                  

 

       Migraine Migraine Disease Active                              [facility]

di



       headache headache               



                                   00:00:                             Hospita



                                   00                                 l

 

       Diabetes Diabetes Disease Active                              [facility]

di



       mellitus mellitus               



                                   00:00:                             Hospita



                                   00                                 l

 

       Dysuria Dysuria Disease Active                              Methodi



                                   23                               st



                                   00:00:                             Hospita



                                   00                                 l

 

       Edema  Edema  Disease Active                              Methodi



                                                                  st



                                   00:00:                             Hospita



                                   00                                 l

 

       Abdominal Abdominal Disease Active                              Met

hodi



       pain   pain                                                st



                                   00:00:                             Hospita



                                   00                                 l

 

       CHF    CHF    Disease Active                              Methodi



       (congestiv (congestiv               629                               st



       e heart e heart               00:00:                             Hospita



       failure) failure)               00                                 l

 

       Flank pain Flank pain Disease Active                              M

ethodi



                                   6-18                               st



                                   00:00:                             [facility]osp[facility]



                                   00                                 l

 

       Renal  Renal  Disease Active                       Overview: Method

i



       stone  stone                [address]



                                   00:00:                      g of this Hospita



                                   00                          note   l



                                                               might be 



                                                               different 



                                                               from the 



                                                               original. 



                                                               Added  



                                                               automatic 



                                                               ally from 



                                                               request 



                                                               for    



                                                               surgery 



                                                               [id number] 

 

       SOLO     SOLO   Diagnosis Active 2021               [facility]

[facility]



              Active                         12:03:00               l



              2021               00:00:                             [facility]

n



              [facility]               00                                 



              [place]                                                    

 

       Symptomati Symptomati Disease Active                       Overview

: Methodi



       c anemia c anemia               4-15                        Formattin st



                                   00:00:                      g of this [facility]osp[facility]



                                   00                          note   l



                                                               might be 



                                                               different 



                                                               from the 



                                                               original. 



                                                               Added  



                                                               automatic 



                                                               ally from 



                                                               request 



                                                               for    



                                                               surgery 



                                                               [id number] 

 

       Acute  Acute  Disease Active                              Methodi



       gallstone gallstone               3-12                               st



       pancreatit pancreatit               00:00:                             Ho

spita



       is     is                   00                                 l

 

       Hypervolem Hypervolem Disease Active                              M

ethodi



       ia     ia                   2-                               st



                                   00:00:                             [facility]osp[facility]



                                   00                                 l

 

       Hypotensio Hypotensio Disease Active 2020                             M

ethodi



       n      n                    2-21                               st



                                   00:00:                             [facility]



                                   00                                 l

 

       Respirator Respirator Disease Active 2020                             M

ethodi



       y failure y failure               1-16                               st



                                   00:00:                             [facility]



                                   00                                 l

 

       Acute  Acute  Disease Active 2020                             Methodi



       cystitis cystitis               1-13                               st



       without without               00:00:                             [facility]



       hematuria hematuria               00                                 l

 

       Class 3 Class 3 Disease Active 2020                             Methodi



       severe severe               0-26                               st



       obesity obesity               00:00:                             [facility]



       without without               00                                 l



       serious serious                                                  



       comorbidit comorbidit                                                  



       y with y with                                                  



       body mass body mass                                                  



       index  index                                                   



       (BMI) of (BMI) of                                                  



       60.0 to 60.0 to                                                  



       69.9 in 69.9 in                                                  



       adult  adult                                                   

 

       Acute  Acute  Disease Active 2020                             Methodi



       renal  renal                0-26                               st



       failure failure               00:00:                             [facility]osp[facility]



       (ARF)  (ARF)                00                                 l

 

       Acute  Acute  Disease Active 2020                             Methodi



       respirator respirator               0-26                               st



       y failure y failure               00:00:                             Hosp

mimi



       with   with                 00                                 l



       hypoxia hypoxia                                                  



       and    and                                                     



       hypercapni hypercapni                                                  



       a      a                                                       

 

       Acute  Acute  Disease Active 2020                             Methodi



       congestive congestive               0-24                               st



       heart  heart                00:00:                             [facility]



       failure failure               00                                 l

 

       Left foot Left foot Disease Active 2019                             Met

hodi



       infection infection               0-15                               st



                                   00:00:                             [facility]ospita



                                   00                                 l

 

       Cellulitis Cellulitis Disease Active                              M

ethodi



       of left of left               915                               st



       leg    leg                  00:00:                             [facility]ospita



                                   00                                 l

 

       Bacteremia Bacteremia Disease Active                              M

ethodi



       due to due to                                              st



       Pseudomona Pseudomona               00:00:                             Ho

spita



       s      s                    00                                 l

 

       COLON   COLON Diagnosis Active 2017               [facility]

[place]



       CANCER CANCER                         05:49:00               l



       SCREENING- SCREENING-               00:00:                             Neel deleon



       Z12.11 Z12.11               00                                 



              Active                                                  



              2017                                                  



               [place]                                                  



              [place]                                                    

 

       R92.1 -  R92.1 - Diagnosis Active 2016               

[place]



       MAMMOGRAPH MAMMOGRAPH                         15:55:00               

l



       IC     IC                   00:01:                             [facility]



       CALCIFCN CALCIFCN               00                                 



       FOUND ON FOUND ON                                                  



              Active                                                  



              2016                                                  



               [facility]                                                  



              Cardinal                                                  



                                                                      

 

       Z12.31 -  Z12.31 - Diagnosis Active 2016             

  [place]



       ENCNTR ENCNTR                         07:08:00               l



       SCREEN SCREEN               00:01:                             [facility]



       MAMMOGRAM MAMMOGRAM               00                                 



       FOR MA FOR MA                                                  



              Active                                                  



              2016                                                  



               [facility]                                                  



              Cardinal                                                  

 

       RT WRIST  RT WRIST Diagnosis Active 2017             

  [place]



       DISTAL DISTAL               1-          02:03:00               l



       RADIUS RADIUS               09:00:                             [facility]



       CLSD FX CLSD FX               00                                 



              Active                                                  



              2016                                                  



              [facility]                                                  



              [facility]                                                  



              Joint                                                   

 

       Abnormal Abnormal Disease Active                       Overview: Me

thodi



       glucose glucose               7-02                        Formattin st



       level  level                00:00:                      g of this Hospita



                                   00                          note   l



                                                               might be 



                                                               different 



                                                               from the 



                                                               original. 



                                                               Data   



                                                               migrated 



                                                               from [place] 



                                                               y on   



                                                               7/8/15.Da 



                                                               ta     



                                                               migrated 



                                                               from [place] 



                                                               y on   



                                                               7/8/15.Da 



                                                               ta     



                                                               migrated 



                                                               from [facility] 



                                                               [facility] 



                                                               y on   



                                                               7/8/15. 

 

       Lymphedema Lymphedema Disease Active                       Overview

: Methodi



                                   2-04                        Formatt[address]



                                   00:00:                      g of this Hospita



                                   00                          note   l



                                                               might be 



                                                               different 



                                                               from the 



                                                               original. 



                                                               Data   



                                                               migrated 



                                                               from [facility] 



                                                               [facility] 



                                                               y on   



                                                               5/30/15. 

 

       Obstructiv Obstructiv Disease Active                       Overview

: Methodi



       e sleep e sleep               2-04                        Formattin st



       apnea  apnea                00:00:                      g of this [facility]osp[facility]



       syndrome syndrome               00                          note   l



                                                               might be 



                                                               different 



                                                               from the 



                                                               original. 



                                                               Data   



                                                               migrated 



                                                               from [facility] 



                                                               [facility] 



                                                               y on   



                                                               5/30/15.D 



                                                               nancie    



                                                               migrated 



                                                               from [facility] 



                                                               [facility] 



                                                               y on   



                                                               5/30/15.D 



                                                               nancie    



                                                               migrated 



                                                               from [facility] 



                                                               [facility] 



                                                               y on   



                                                               5/30/15.D 



                                                               nancie    



                                                               migrated 



                                                               from [facility] 



                                                               [facility] 



                                                               y on   



                                                               5/30/15.D 



                                                               nancie    



                                                               migrated 



                                                               from [facility] 



                                                               [facility] 



                                                               y on   



                                                               5/30/15.D 



                                                               nancie    



                                                               migrated 



                                                               from [facility] 



                                                               [facility] 



                                                               y on   



                                                               5/30/15. 

 

       Hypothyroi Hypothyroi Disease Active                       Overview

: Methodi



       dism   dism                 4-24                        Formatt[address]



                                   00:00:                      g of this Hospita



                                   00                          note   l



                                                               might be 



                                                               different 



                                                               from the 



                                                               original. 



                                                               Data   



                                                               migrated 



                                                               from [facility] 



                                                               [facility] 



                                                               y on   



                                                               5/30/15. 

 

       Depressive Depressive Disease Active                       Overview

: Methodi



       disorder disorder               4-24                        [address]



                                   00:00:                      g of this Hospita



                                   00                          note   l



                                                               might be 



                                                               different 



                                                               from the 



                                                               original. 



                                                               Data   



                                                               migrated 



                                                               from [facility] 



                                                               [facility] 



                                                               y on   



                                                               5/30/15.D 



                                                               nancie    



                                                               migrated 



                                                               from [facility] 



                                                               [facility] 



                                                               y on   



                                                               5/30/15.D 



                                                               nancie    



                                                               migrated 



                                                               from [facility] 



                                                               [facility] 



                                                               y on   



                                                               5/30/15.D 



                                                               nancie    



                                                               migrated 



                                                               from [facility] 



                                                               [facility] 



                                                               y on   



                                                               5/30/15. 

 

       Obesity  Obesity Problem Active 2016               [facility]

[facility]ia



       (disorder) (disorder)               8-20          00:23:22               

l



              Active               00:00:                             [facility]



              2012               00                                 



              Problem                                                  



              2016                                                  



               Data                                                   



              migrated                                                  



              from [facility]                                                  



              [facility]                                                  



              on                                                      



              5/30/15.                                                  



              [facility]                                                  



              [place],                                                  



              [facility]                                                  



              [place],[facility]                                                     



              [place]                                                  



              [place],[facility]                                                  



              Bone &                                                  



              Joint                                                   

 

       Cobalamin Cobalamin Disease Active                       Overview: 

Methodi



       deficiency deficiency               7-11                        Formattin

 st



                                   00:00:                      g of this Hospita



                                   00                          note   l



                                                               might be 



                                                               different 



                                                               from the 



                                                               original. 



                                                               Data   



                                                               migrated 



                                                               from [facility] 



                                                               [facility] 



                                                               y on   



                                                               5/30/15.D 



                                                               nancie    



                                                               migrated 



                                                               from [facility] 



                                                               [facility] 



                                                               y on   



                                                               5/30/15.D 



                                                               nancie    



                                                               migrated 



                                                               from [facility] 



                                                               [facility] 



                                                               y on   



                                                               5/30/15.D 



                                                               nancie    



                                                               migrated 



                                                               from [facility] 



                                                               [facility] 



                                                               y on   



                                                               5/30/15. 

 

       Hypertensi  Hypertens Problem Active -0        2016            

   [facility]



       ve episode kyle                  7-10          00:23:22               l



       (disorder) episode               00:00:                             [place]

nn



              (disorder)               00                                 



              Active                                                  



              07/10/2012                                                  



              Problem                                                  



              2016                                                  



              Data                                                    



              migrated                                                  



              from [employer]                                                  



              [employer]                                                  



              on                                                      



              5/30/15.                                                  



               [facility]                                                  



              Tessa,                                                  



              [facility]                                                  



              [place],                                                  



              [facility]                                                     



              [place]                                                  



              [facility]                                                  



              [place]                                                  



              Bone &                                                  



              Joint                                                   

 

       Calcificat  Calcifica Problem Resolve               2022           

    [facility]



       ion of tion of        d                    03:11:55               l



       breast breast                                                  [place]



       (finding) (finding)                                                  



              Resolved                                                  



              Problem                                                  



              2022                                                  



              Left                                                   



              Medical                                                  



              Group,                                                  



              [facility]                                                    



              Tessa,                                                  



              [facility]                                                  



              Land,                                                  



              [facility]                                                     



              [place]                                                  



              [facility]                                                  



              [place]                                                  



              Bone &                                                  



              Joint,                                                  



              Cardinal                                                  

 

       Acute   Acute Problem Active               2020               [facility]

ia



       urinary urinary                             22:36:35               l



       tract  tract                                                   [place]



       infection infection                                                  



       (disorder) (disorder)                                                  



              Active                                                  



              Problem                                                  



              2020                                                  



               Medical                                                  



              Group                                                   

 

       Chronic   Chronic Problem Active               2020               M

[facility]oria



       renal  renal                              22:36:35               l



       impairment impairment                                                  He

rmann



       (disorder) (disorder)                                                  



              Active                                                  



              Problem                                                  



              2020                                                  



                                                                    



              Medical                                                  



              Group,                                                  



              [facility]                                                    



              [place],                                                  



              [facility]                                                  



              Land,                                                  



              [facility]                                                     



              [place]                                                  



              [facility]                                                  



              [place]                                                  



              Bone [facility]                                                  



              Joint,                                                  



              Cardinal                                                  

 

       Benign  Benign Problem Active               2022               [place]

milan



       essential essential                             03:11:55               l



       hypertensi hypertensi                                                  He

rmann



       on     on                                                      



       (disorder) (disorder)                                                  



              Active                                                  



              Problem                                                  



              2022                                                  



               Medical                                                  



              Group,                                                  



              [facility]                                                    



              [place]essa,                                                  



              [facility]                                                  



              Land,                                                  



              [facility]                                                     



              [place]                                                  



              [facility]                                                  



              [place]                                                  



              [facility]one &                                                  



              Joint,                                                  



              Cardinal                                                  

 

       Cardiorena  Cardioren Problem Active               2022            

   [facility]



       l syndrome al                                 03:11:55               l



       (disorder) syndrome                                                  [facility]

daryl



              (disorder)                                                  



              Active                                                  



              Problem                                                  



              2022                                                  



               Medical                                                  



              Group,                                                  



              [facility]                                                  



              Land,                                                  



              Cardinal                                                  



                                                                      

 

       Chronic  Chronic Problem Active               2022               Me

moria



       kidney kidney                             03:11:55               l



       disease disease                                                  [place]



       (disorder) (disorder)                                                  



              Active                                                  



              Problem                                                  



              2022                                                  



               Medical                                                  



              Group,                                                  



              [facility]                                                  



              Land,                                                  



              Cardinal                                                  



                                                                      

 

       Chronic  Chronic Problem Active               2022               Me

moria



       kidney kidney                             03:11:55               l



       disease disease                                                  [place]



       stage 3 stage 3                                                  



       (disorder) (disorder)                                                  



              Active                                                  



              Problem                                                  



              2022                                                  



               Medical                                                  



              Group,                                                  



              [facility]                                                  



              Land,                                                  



              Cardinal                                                  



                                                                      

 

       Chronic  Chronic Problem Active               2022               Me

moria



       pain   pain                               03:11:55               l



       (finding) (finding)                                                  [facility]

daryl



              Active                                                  



              Problem                                                  



              2022                                                  



               Medical                                                  



              Group,                                                  



              [facility]                                                  



              Land,                                                  



              Cardinal                                                  

 

       Dependence  Dependenc Problem Active               2022            

   [facility]



       on     e on                               03:11:55               l



       supplement supplement                                                  Neel deleon



       al oxygen al oxygen                                                  



       (finding) (finding)                                                  



              Active                                                  



              Problem                                                  



              2022                                                  



               Medical                                                  



              Group,                                                  



              Cardinal                                                  

 

       Edema of  Edema of Problem Active               2022               

[facility]



       lower  lower                              03:11:55               l



       extremity extremity                                                  [facility]

daryl



       (finding) (finding)                                                  



              Active                                                  



              Problem                                                  



              2022                                                  



               Medical                                                  



              Group,                                                  



              [facility]                                                  



              Land,                                                  



              Cardinal                                                  

 

       Hypertensi  Hypertens Problem Active               2022            

   [facility]



       ve     kyle                                03:11:55               l



       disorder, disorder,                                                  [place]

daryl



       systemic systemic                                                  



       arterial arterial                                                  



       (disorder) (disorder)                                                  



              Active                                                  



              Problem                                                  



              2022                                                  



               Medical                                                  



              Group,[facility]                                                  



              ical                                                    



              Specialty                                                  



              Hospital                                                  



              [facility],                                                  



              [facility]                                                  



              Land,                                                  



              Cardinal                                                  

 

       Hypertensi  Hypertens Problem Active               2022            

   [facility]



       ve renal kyle renal                             03:11:55               l



       disease disease                                                  [place]



       (disorder) (disorder)                                                  



               Active                                                  



              Problem                                                  



              2022                                                  



               Medical                                                  



              Group,                                                  



              [facility]                                                  



              Land,                                                  



              Cardinal                                                  

 

       Lymphedema  Lymphedem Problem Active               2022            

   [facility]



       of lower a of lower                             03:11:55               l



       extremity extremity                                                  [facility]

daryl



       (disorder) (disorder)                                                  



               Active                                                  



              Problem                                                  



              2022                                                  



               Medical                                                  



              Group,                                                  



              [facility]                                                  



              Land,                                                  



              Cardinal                                                  

 

       Morbid  Morbid Problem Active               2022               [place]

milan



       obesity obesity                             03:11:55               l



       (disorder) (disorder)                                                  He

rmdaryl



              Active                                                  



              Problem                                                  



              2022                                                  



               Medical                                                  



              Group,                                                  



              [facility]                                                    



              [place],                                                  



              [facility]                                                  



              Land,[facility]                                                     



              [place]                                                  



              [facility]                                                  



              [place]                                                  



              Bone &                                                  



              Joint,                                                  



              Cardinal                                                  

 

       Post-disch  Post-disc Problem Active               2022            

   [facility]



       argpablito   harge                              03:11:55               l



       follow-up follow-up                                                  Abraham brito



       (finding) (finding)                                                  



              Active                                                  



              Problem                                                  



              2022                                                  



               Medical                                                  



              Group,                                                  



              Cardinal                                                  

 

       Stasis  Stasis Problem Active               2022               [place]

milan



       ulcer  ulcer                              03:11:55               l



       (disorder) (disorder)                                                  He

rmann



              Active                                                  



              Problem                                                  



              2022                                                  



               Medical                                                  



              Group,                                                  



              [facility]                                                  



              Land,                                                  



              Cardinal                                                  



                                                                      

 

       Swelling -  Swelling Problem Active               2022             

  [facility]



       edema - - edema -                             03:11:55               l



       symptom symptom                                                  [place]



       (finding) (finding)                                                  



              Active                                                  



              Problem                                                  



              2022                                                  



               Medical                                                  



              Group,                                                  



              [facility]                                                  



              Land,                                                  



              Cardinal                                                  



                                                                      

 

       Kidney  Kidney Problem Active               2017               [place]

milan



       disease disease                             03:07:28               l



       (disorder) (disorder)                                                  He

rmann



              Active                                                  



              Problem                                                  



              2017                                                  



               [place]                                                  



              Land                                                    

 

       RIGHT   RIGHT Diagnosis Active               2016               Mem

oria



       WRIST  WRIST                              15:06:00               l



       DISTAL DISTAL                                                  [place]



       RADIUS RADIUS                                                  



       CLSD FX CLSD FX                                                  



              [facility]                                                  



              [facility]                                                  



              [facility]                                                  



              [facility]                                                   

 

       DISTAL   DISTAL Diagnosis Active               2016               M

emoria



       RADIUS RADIUS                             09:46:00               l



       CLSD FX CLSD FX                                                  [place]



              Active [facility]                                                     



              [place]                                                  



              Trace                                                   

 

       Long-term  Long-term Problem Active               2022             

  [facility]



       current current                             03:11:55               l



       use of use of                                                  aBrron



       drug   drug                                                    



       therapy therapy                                                  



       (situation (situation                                                  



       )      ) Active                                                  



              Problem                                                  



              2022                                                  



               Medical                                                  



              Group                                                   

 

       Cholestero  Cholester Problem                      2016            

   [facility]



       l      ol                                 05:02:18               l



       (substance (substance                                                  He

rmann



       )      )  Problem                                                  



              2016                                                  



              Surgical                                                  



              Specialty                                                  



              [facility]                                                    

 

       Sleep   Sleep Problem                      2016               [facility]

ia



       apnea  apnea                              05:02:18               l



       (finding) (finding)                                                  [place]

daryl



              Problem                                                  



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                                                  

 

           History SDOH 2021 2                     [facility]



           Housing Homeless 00:00:00   00:00:00                         Hospital



           Last Year                                              

 

           Alcohol Comment 2021-04-15 2021-04-15 rarely                [facility]



                      00:00:00   00:00:00                         Hospital

 

           History [facility] 2021 1                     [facility]



           Alcohol Frequency 00:00:00   00:00:00                         Hospita

l

 

           Social History 2020                       [facility]



                      15:59:50   15:59:50                         

 

           Sex Assigned At 1955                       [facility]

[facility]



           [facility]      [facility]   [facility]









                Smoking Status  Start Date      Stop Date       Source

 

                Social [facility]







Medications







       Ordered Filled Start  Stop   Current Ordering Indication Dosage Frequency

 Signature

                    Comments            Components          Source



     Medication Medication Date Date Medication? Clinician                (SIG) 

          



     Name Name                                                   

 

     brimonidine      2022      Yes            1[drp] Q.5D 1 drop 2           

[facility]



     (ALPHAGAN)      2-07                               (two)           [facility]



     0.15 %      13:19:                               times           [facility]



     ophthalmic      20                                 daily.           Center



     solution                                                        

 

     apixaban      2022      Yes            2.5mg Q.5D Take 2.5           [facility]



     (ELIQUIS)      2-07                               mg by           [facility]



     2.5 mg Tab      13:19:                               mouth 2           [facility]

[facility]



     tablet      20                                 (two)           Center



                                                  times           



                                                  daily.           

 

     bumetanide      2022      Yes            2mg  QD   Take 2 mg           [facility]



     (BUMEX) 2      2-07                               by mouth           [facility]



     MG tablet      13:19:                               daily.           [facility]

[facility]



               20                                                Center

 

     pantoprazol      2022      Yes            40mg QD   Take 40 mg           

[facility]



     e         2-07                               by mouth           [facility]



     (PROTONIX)      13:19:                               daily.           [facility]

al



     40 MG      20                                                Center



     tablet                                                        

 

     brimonidine      2022      Yes            1[drp] Q.5D 1 drop 2           

[facility]



     (ALPHAGAN)      2-07                               (two)           [facility]



     0.15 %      13:19:                               times           [facility]



     ophthalmic      20                                 daily.           Center



     solution                                                        

 

     apixaban      2022      Yes            2.5mg Q.5D Take 2.5           [facility]



     (ELIQUIS)      2-07                               mg by           [facility]



     2.5 mg Tab      13:19:                               mouth 2           [facility]

[facility]



     tablet      20                                 (two)           Center



                                                  times           



                                                  daily.           

 

     bumetanide      2022      Yes            2mg  QD   Take 2 mg           [facility]



     (BUMEX) 2      2-07                               by mouth           [facility]



     MG tablet      13:19:                               daily.           [facility]

l



               20                                                Center

 

     pantoprazol      2022      Yes            40mg QD   Take 40 mg           

[facility]



     e         2-07                               by mouth           [facility]



     (PROTONIX)      13:19:                               daily.           [facility]

al



     40 MG      20                                                Center



     tablet                                                        

 

     brimonidine      2022      Yes            1[drp] Q.5D 1 drop 2           

[facility]



     (ALPHAGAN)      2-07                               (two)           [facility]



     0.15 %      13:19:                               times           Medical



     ophthalmic      20                                 daily.           Center



     solution                                                        

 

     apixaban      2022      Yes            2.5mg Q.5D Take 2.5           [facility]



     (ELIQUIS)      2-07                               mg by           [facility]



     2.5 mg Tab      13:19:                               mouth 2           [facility]

[facility]



     tablet      20                                 (two)           Center



                                                  times           



                                                  daily.           

 

     bumetanide      2022      Yes            2mg  QD   Take 2 mg           [facility]



     (BUMEX) 2      2-07                               by mouth           [facility]



     MG tablet      13:19:                               daily.           [facility]

l



               20                                                Center

 

     pantoprazol      2022      Yes            40mg QD   Take 40 mg           

[facility]



     e         2-07                               by mouth           [facility]



     (PROTONIX)      13:19:                               daily.           [facility]

al



     40 MG      20                                                Center



     tablet                                                        

 

     brimonidine      2022      Yes            1[drp] Q.5D 1 drop 2           

[facility]



     (ALPHAGAN)      2-07                               (two)           [facility]



     0.15 %      13:19:                               times           Medical



     ophthalmic      20                                 daily.           Center



     solution                                                        

 

     apixaban      2022      Yes            2.5mg Q.5D Take 2.5           [facility]



     (ELIQUIS)      2-07                               mg by           [facility]



     2.5 mg Tab      13:19:                               mouth 2           [facility]

[facility]



     tablet      20                                 (two)           Center



                                                  times           



                                                  daily.           

 

     bumetanide      2022      Yes            2mg  QD   Take 2 mg           [facility]



     (BUMEX) 2      2-07                               by mouth           [facility]



     MG tablet      13:19:                               daily.           [facility]

l



               20                                                Center

 

     pantoprazol      2022      Yes            40mg QD   Take 40 mg           

[facility]



     e         2-07                               by mouth           [facility]



     (PROTONIX)      13:19:                               daily.           [facility]

al



     40 MG      20                                                Center



     tablet                                                        

 

     brimonidine      2022      Yes            1[drp] Q.5D 1 drop 2           

[facility]



     (ALPHAGAN)      2-07                               (two)           [facility]



     0.15 %      13:19:                               times           Medical



     ophthalmic      20                                 daily.           Center



     solution                                                        

 

     apixaban      2022      Yes            2.5mg Q.5D Take 2.5           [facility]



     (ELIQUIS)      2-07                               mg by           [facility]



     2.5 mg Tab      13:19:                               mouth 2           [facility]

[facility]



     tablet      20                                 (two)           Center



                                                  times           



                                                  daily.           

 

     bumetanide      2022      Yes            2mg  QD   Take 2 mg           [facility]



     (BUMEX) 2      2-07                               by mouth           [facility]



     MG tablet      13:19:                               daily.           [facility]

l



               20                                                Center

 

     pantoprazol      2022      Yes            40mg QD   Take 40 mg           

[facility]



     e         2-07                               by mouth           [facility]



     (PROTONIX)      13:19:                               daily.           [facility]

al



     40 MG      20                                                Center



     tablet                                                        

 

     midodrine      2022- Yes            5mg       Take 1           [facility]



     (PROAMATINE      2-07 02-05                          tablet (5           [facility]



     ) 5 MG      00:00: 23:59                          mg total)           [facility]

al



     tablet      00   :00                           by mouth 3           Center



                                                  (three)           



                                                  times           



                                                  daily           



                                                  before           



                                                  meals for           



                                                  60 days.           

 

     midodrine      2022- Yes            5mg       Take 1           [facility]



     (PROAMATINE      2-07 02-05                          tablet (5           [facility]

[facility]



     ) 5 MG      00:00: 23:59                          mg total)           [facility]

al



     tablet      00   :00                           by mouth 3           Center



                                                  (three)           



                                                  times           



                                                  daily           



                                                  before           



                                                  meals for           



                                                  60 days.           

 

     midodrine      2022- Yes            5mg       Take 1           [facility]



     (PROAMATINE      2-07 02-05                          tablet (5           [facility]

[facility]



     ) 5 MG      00:00: 23:59                          mg total)           [facility]

al



     tablet      00   :00                           by mouth 3           Center



                                                  (three)           



                                                  times           



                                                  daily           



                                                  before           



                                                  meals for           



                                                  60 days.           

 

     midodrine      2022- Yes            5mg       Take 1           [facility]



     (PROAMATINE      2-07 02-05                          tablet (5           [facility]



     ) 5 MG      00:00: 23:59                          mg total)           [facility]

al



     tablet      00   :00                           by mouth 3           Center



                                                  (three)           



                                                  times           



                                                  daily           



                                                  before           



                                                  meals for           



                                                  60 days.           

 

     midodrine      2022- Yes            5mg       Take 1           [facility]



     (PROAMATINE      2-07 02-05                          tablet (5           [facility]

[facility]



     ) 5 MG      00:00: 23:59                          mg total)           Medic

al



     tablet      00   :00                           by mouth 3           Center



                                                  (three)           



                                                  times           



                                                  daily           



                                                  before           



                                                  meals for           



                                                  60 days.           

 

     midodrine      2022- No             10mg [id number] Take 10 mg      

     [facility]



     (PROAMATINE                           [id number] by mouth 3      

     [facility]



     ) 10 MG      19:03: 00:00                     3D   (three)           [facility]

l



     tablet      43   :00                           times           Center



                                                  daily.           

 

     digoxin      2022- No             125ug      Take 125           [facility]



     (LANOXIN)      2 12-06                          mcg by           [facility]



     0.125 MG      19:03: 00:00                          mouth 3           [facility]

al



     tablet      43   :00                           times per           Center



                                                  week .           

 

     metoprolol      2022- No             25mg Q.5D Take 25 mg           

[facility]



     tartrate      -                          by mouth 2           [facility]

[facility]



     (LOPRESSOR)      19:03: 00:00                          (two)           [facility]

[facility]



     25 MG      43   :00                           times           Center



     tablet                                         daily.           

 

     midodrine      2022- No             10mg [id number] Take 10 mg      

     [facility]



     (PROAMATINE      -                     [id number] by mouth 3      

     [facility]



     ) 10 MG      19:03: 00:00                     3D   (three)           [facility]

l



     tablet      43   :00                           times           Center



                                                  daily.           

 

     digoxin      2022- No             125ug      Take 125           [facility]



     (LANOXIN)      2 12-06                          mcg by           [facility]



     0.125 MG      19:03: 00:00                          mouth 3           [facility]

al



     tablet      43   :00                           times per           Center



                                                  week .           

 

     metoprolol      -2022- No             25mg Q.5D Take 25 mg           

[facility]



     tartrate      2- 12-06                          by mouth 2           [facility]

[facility]



     (LOPRESSOR)      19:03: 00:00                          (two)           [facility]

[facility]



     25 MG      43   :00                           times           Center



     tablet                                         daily.           

 

     midodrine      -2022- No             10mg [id number] Take 10 mg      

     [facility]



     (PROAMATINE      --                     [id number] by mouth 3      

     [facility]



     ) 10 MG      19:03: 00:00                     3D   (three)           [facility]

l



     tablet      43   :00                           times           Center



                                                  daily.           

 

     digoxin      -2022- No             125ug      Take 125           [facility]



     (LANOXIN)      2- 12-06                          mcg by           [facility]



     0.125 MG      19:03: 00:00                          mouth 3           [facility]

al



     tablet      43   :00                           times per           Center



                                                  week .           

 

     metoprolol      -2022- No             25mg Q.5D Take 25 mg           

[facility]



     tartrate      2--                          by mouth 2           [facility]

[facility]



     (LOPRESSOR)      19:03: 00:00                          (two)           [facility]

[facility]



     25 MG      43   :00                           times           Center



     tablet                                         daily.           

 

     midodrine      -2022- No             10mg [id number] Take 10 mg      

     [facility]



     (PROAMATINE      --                     [id number] by mouth 3      

     [facility]



     ) 10 MG      19:03: 00:00                     3D   (three)           [facility]

l



     tablet      43   :00                           times           Center



                                                  daily.           

 

     digoxin      -2022- No             125ug      Take 125           [facility]



     (LANOXIN)      2- 12-06                          mcg by           [facility]



     0.125 MG      19:03: 00:00                          mouth 3           Medic

al



     tablet      43   :00                           times per           Center



                                                  week .           

 

     metoprolol      2022- No             25mg Q.5D Take 25 mg           

[facility]



     tartrate      2--06                          by mouth 2           [facility]



     (LOPRESSOR)      19:03: 00:00                          (two)           [facility]

[facility]



     25 MG      43   :00                           times           Center



     tablet                                         daily.           

 

     digoxin      -2022- No             125ug      Take 125           [facility]



     (LANOXIN)      2- 12-06                          mcg by           [facility]



     0.125 MG      19:03: 00:00                          mouth 3           Med[facility]

al



     tablet      43   :00                           times per           Center



                                                  week .           

 

     metoprolol      2022 No             25mg Q.5D Take 25 mg           

[facility]



     tartrate                                by mouth 2           [facility]



     (LOPRESSOR)      19:03: 00:00                          (two)           [facility]

[facility]



     25 MG      43   :00                           times           Center



     tablet                                         daily.           

 

     midodrine      2022 No             10mg Q.[id number] Take 10 mg      

     [facility]



     (PROAMATINE                           [id number] by mouth 3      

     [facility]



     ) 10 MG      19:03: 00:00                     3D   (three)           [facility]

l



     tablet      43   :00                           times           Center



                                                  daily.           

 

     mINOCYCLine      2022      Yes            100mg      Take 1           [facility]



     (MINOCIN,DY      2-06                               capsule           [facility]



     NACIN) 100      00:00:                               (100 mg           [facility]

[facility]



     MG capsule      00                                 total) by           Cent

er



                                                  mouth           



                                                  every 12           



                                                  (twelve)           



                                                  hours.           

 

     mINOCYCLine      2022      Yes            100mg      Take 1           [facility]



     (MINOCIN,DY      2-06                               capsule           [facility]



     NACIN) 100      00:00:                               (100 mg           [facility]

[facility]



     MG capsule      00                                 total) by           Cent

er



                                                  mouth           



                                                  every 12           



                                                  (twelve)           



                                                  hours.           

 

     mINOCYCLine      2022      Yes            100mg      Take 1           [facility]



     (MINOCIN,DY      2-06                               capsule           [facility]



     NACIN) 100      00:00:                               (100 mg           [facility]

[facility]



     MG capsule      00                                 total) by           Cent

er



                                                  mouth           



                                                  every 12           



                                                  (twelve)           



                                                  hours.           

 

     mINOCYCLine      2022      Yes            100mg      Take 1           [facility]



     (MINOCIN,DY      2-06                               capsule           [facility]



     NACIN) 100      00:00:                               (100 mg           [facility]

[facility]



     MG capsule      00                                 total) by           Cent

er



                                                  mouth           



                                                  every 12           



                                                  (twelve)           



                                                  hours.           

 

     mINOCYCLine      2022      Yes            100mg      Take 1           [facility]



     (MINOCIN,DY      2-06                               capsule           [facility]



     NACIN) 100      00:00:                               (100 mg           [facility]

[facility]



     MG capsule      00                                 total) by           Cent

er



                                                  mouth           



                                                  every 12           



                                                  (twelve)           



                                                  hours.           

 

     promethazin      2022 No             12.5mg      Take 1           [facility]

[facility]



     e                                   tablet           [facility]



     (PHENERGAN)      00:00: 23:59                          (12.5 mg           [facility]

[facility]



     12.5 MG      00   :00                           total) by           Center



     tablet                                         mouth           



                                                  every 6           



                                                  (six)           



                                                  hours as           



                                                  needed for           



                                                  Nausea for           



                                                  up to 15           



                                                  days.           

 

     promethazin      2022- No             12.5mg      Take 1           [facility]

[facility]



     e                                   tablet           [facility]



     (PHENERGAN)      00:00: 23:59                          (12.5 mg           [facility]

[facility]



     12.5 MG      00   :00                           total) by           Center



     tablet                                         mouth           



                                                  every 6           



                                                  (six)           



                                                  hours as           



                                                  needed for           



                                                  Nausea for           



                                                  up to 15           



                                                  days.           

 

     promethazin      2022 No             12.5mg      Take 1           [facility]

[facility]



     e                                   tablet           [facility]



     (PHENERGAN)      00:00: 23:59                          (12.5 mg           [facility]

[facility]



     12.5 MG      00   :00                           total) by           Center



     tablet                                         mouth           



                                                  every 6           



                                                  (six)           



                                                  hours as           



                                                  needed for           



                                                  Nausea for           



                                                  up to 15           



                                                  days.           

 

     promethazin      2022 No             12.5mg      Take 1           C

[facility]



     e                                   tablet           [facility]



     (PHENERGAN)      00:00: 23:59                          (12.5 mg           [facility]

[facility]



     12.5 MG      00   :00                           total) by           Center



     tablet                                         mouth           



                                                  every 6           



                                                  (six)           



                                                  hours as           



                                                  needed for           



                                                  Nausea for           



                                                  up to 15           



                                                  days.           

 

     promethazin      2022 No             12.5mg      Take 1           C

[facility]



     e                                   tablet           [facility]



     (PHENERGAN)      00:00: 23:59                          (12.5 mg           [facility]

[facility]



     12.5 MG      00   :00                           total) by           Center



     tablet                                         mouth           



                                                  every 6           



                                                  (six)           



                                                  hours as           



                                                  needed for           



                                                  Nausea for           



                                                  up to 15           



                                                  days.           

 

     atorvastati      2022      Yes            10mg QD   Take 10 mg           

[facility]



     n (LIPITOR)      1-18                               by mouth           st



     10 mg      10:44:                               every           [facility]



     tablet      59                                 evening.           l

 

     sucroferric      2022      Yes            500mg Q.[id number] Chew 500      

     [facility]



     oxyhydroxid      1-18                          [id number] mg 3           st



     e         10:44:                          3D   (three)           [facility]



     (Velphoro)      59                                 times a           l



     500 mg                                         day with           



     tablet,chew                                         meals.           



     able                                                        

 

     ondansetron      2022      Yes            4mg  Q8H  Take 4 mg           [facility]

[facility]



     (ZOFRAN) 4      1-18                               by mouth           st



     MG tablet      10:44:                               every 8           [facility]

ta



               59                                 (eight)           l



                                                  hours as           



                                                  needed for           



                                                  nausea or           



                                                  vomiting.           

 

     brimonidine      2022      Yes            1[drp] Q.5D Administer         

  [facility]



     (ALPHAGAN      1-18                               1 drop to           st



     P) 0.1 %      10:44:                               both eyes           [facility]

[facility]



     drops      59                                 2 (two)           l



                                                  times a           



                                                  day.           

 

     folic acid      2022      Yes            1mg  QD   Take 1           [facility]

[facility]



     (FOLVITE) 1      -18                               tablet (1           st



     MG tablet      10:44:                               mg total)           [facility]

[facility]



               59                                 by mouth           l



                                                  daily.           

 

     dicyclomine      2022      Yes            20mg Q.[id number] Take 1         

  [facility]



     (BENTYL) 20      -18                          [id number] tablet (20       

    st



     mg tablet      10:44:                          3W   mg total)           [facility]

[facility]



               59                                 by mouth 3           l



                                                  (three)           



                                                  times a           



                                                  week.           

 

     sevelamer      2022      Yes            800mg Q.[id number] Take 1          

 [facility]



     (RENVELA)      -18                          [id number] tablet           st



     800 mg      10:44:                          3D   (800 mg           [facility]



     tablet      59                                 total) by           l



                                                  mouth 3           



                                                  (three)           



                                                  times a           



                                                  day with           



                                                  meals. 2           



                                                  tabs with           



                                                  meals           

 

     atorvastati      2022      Yes            10mg QD   Take 10 mg           

[facility]



     n (LIPITOR)      18                               by mouth           st



     10 mg      10:44:                               every           [facility]



     tablet      59                                 evening.           l

 

     sucroferric      2022      Yes            500mg Q.[id number] Chew 500      

     [facility]



     oxyhydroxid      -18                          [id number] mg 3           st



     e         10:44:                          3D   (three)           [facility]



     (Velphoro)      59                                 times a           l



     500 mg                                         day with           



     tablet,chew                                         meals.           



     able                                                        

 

     ondansetron      2022      Yes            4mg  Q8H  Take 4 mg           [facility]

[facility]



     (ZOFRAN) 4      -18                               by mouth           st



     MG tablet      10:44:                               every 8           [facility]

ta



               59                                 (eight)           l



                                                  hours as           



                                                  needed for           



                                                  nausea or           



                                                  vomiting.           

 

     brimonidine      2022      Yes            1[drp] Q.5D Administer         

  [facility]



     (ALPHAGAN      -18                               1 drop to           st



     P) 0.1 %      10:44:                               both eyes           [facility]

[facility]



     drops      59                                 2 (two)           l



                                                  times a           



                                                  day.           

 

     folic acid      2022      Yes            1mg  QD   Take 1           [facility]

[facility]



     (FOLVITE) 1      -18                               tablet (1           st



     MG tablet      10:44:                               mg total)           [facility]

[facility]



               59                                 by mouth           l



                                                  daily.           

 

     dicyclomine      2022      Yes            20mg Q.[id number] Take 1         

  [facility]



     (BENTYL) 20      -18                          [id number] tablet (20       

    st



     mg tablet      10:44:                          3W   mg total)           [facility]

[facility]



               59                                 by mouth 3           l



                                                  (three)           



                                                  times a           



                                                  week.           

 

     sevelamer      2022      Yes            800mg Q.[id number] Take 1          

 [facility]



     (RENVELA)                                [id number] tablet           st



     800 mg      10:44:                          3D   (800 mg           [facility]



     tablet      59                                 total) by           l



                                                  mouth 3           



                                                  (three)           



                                                  times a           



                                                  day with           



                                                  meals. 2           



                                                  tabs with           



                                                  meals           

 

     apixaban      2022 No             2.5mg Q.5D Take 1           [facility]

[facility]



     (ELIQUIS)                                tablet           st



     2.5 mg      10:03: 00:00                          (2.5 mg           [facility]



     tablet      30   :00                           total) by           l



                                                  mouth 2           



                                                  (two)           



                                                  times a           



                                                  day.           

 

     apixaban      2022 No             2.5mg Q.5D Take 1           [facility]

[facility]



     (ELIQUIS)                                tablet           st



     2.5 mg      10:03: 00:00                          (2.5 mg           [facility]



     tablet      30   :00                           total) by           l



                                                  mouth 2           



                                                  (two)           



                                                  times a           



                                                  day.           

 

     apixaban      2022 No             2.5mg Q.5D Take 1           [facility]

[facility]



     (ELIQUIS)                                tablet           st



     2.5 mg      10:03: 00:00                          (2.5 mg           [facility]



     tablet      30   :00                           total) by           l



                                                  mouth 2           



                                                  (two)           



                                                  times a           



                                                  day.           

 

     apixaban      2022      Yes            5mg  Q.5D Take 1           [facility]



     (ELIQUIS) 5      -07                               tablet (5           st



     mg tablet      00:00:                               mg total)           [facility]

[facility]



               00                                 by mouth 2           l



                                                  (two)           



                                                  times a           



                                                  day.           

 

     apixaban      2022      Yes            5mg  Q.5D Take 1           [facility]



     (ELIQUIS) 5      -07                               tablet (5           st



     mg tablet      00:00:                               mg total)           [facility]

[facility]



               00                                 by mouth 2           l



                                                  (two)           



                                                  times a           



                                                  day.           

 

     apixaban      2022      Yes            5mg  Q.5D Take 1           [facility]



     (ELIQUIS) 5      1-07                               tablet (5           st



     mg tablet      00:00:                               mg total)           [facility]

[facility]



               00                                 by mouth 2           l



                                                  (two)           



                                                  times a           



                                                  day.           

 

     atorvastati      2022      Yes            10mg QD   Take 10 mg           

[facility]



     n (LIPITOR)                                     by mouth           st



     10 mg      18:12:                               every           [facility]



     tablet      11                                 evening.           l

 

     sucroferric      2022      Yes            500mg Q.[id number] Chew 500      

     [facility]



     oxyhydroxid                                [id number] mg 3           st



     e         18:12:                          3D   (three)           [facility]



     (Velphoro)      11                                 times a           l



     500 mg                                         day with           



     tablet,chew                                         meals.           



     able                                                        

 

     ondansetron      2022      Yes            4mg  Q8H  Take 4 mg           [facility]

[facility]



     (ZOFRAN) 4      06                               by mouth           st



     MG tablet      18:12:                               every 8           [facility]

ta



               11                                 (eight)           l



                                                  hours as           



                                                  needed for           



                                                  nausea or           



                                                  vomiting.           

 

     brimonidine      2022      Yes            1[drp] Q.5D Administer         

  [facility]



     (ALPHAGAN                                     1 drop to           st



     P) 0.1 %      18:12:                               both eyes           [facility]

[facility]



     drops      11                                 2 (two)           l



                                                  times a           



                                                  day.           

 

     folic acid      2022      Yes            1mg  QD   Take 1           [facility]

[facility]



     (FOLVITE) 1                                     tablet (1           st



     MG tablet      18:12:                               mg total)           [facility]

[facility]



               11                                 by mouth           l



                                                  daily.           

 

     pantoprazol      2022 No             40mg QD   Take 40 mg          

 [facility]



     e                                   by mouth           st



     (PROTONIX)      16:58: 00:00                          daily.           [facility]

[facility]



     40 MG EC      50   :00                                          l



     tablet                                                        

 

     pantoprazol      2022 No             40mg QD   Take 40 mg          

 [facility]



     e                                   by mouth           st



     (PROTONIX)      16:58: 00:00                          daily.           [facility]

[facility]



     40 MG EC      50   :00                                          l



     tablet                                                        

 

     pantoprazol      2022 No             40mg QD   Take 40 mg          

 [facility]



     e                                   by mouth           st



     (PROTONIX)      16:58: 00:00                          daily.           [facility]

[facility]



     40 MG EC      50   :00                                          l



     tablet                                                        

 

     pantoprazol      2022      Yes            40mg Q.5D Take 1           [facility]

[facility]



     e         1-06                               tablet (40           st



     (PROTONIX)      00:00:                               mg total)           [facility]

[facility]



     40 MG EC      00                                 by mouth 2           l



     tablet                                         (two)           



                                                  times a           



                                                  day.           

 

     pantoprazol      2022      Yes            40mg Q.5D Take 1           [facility]

[facility]



     e         1-06                               tablet (40           st



     (PROTONIX)      00:00:                               mg total)           [facility]

[facility]



     40 MG EC      00                                 by mouth 2           l



     tablet                                         (two)           



                                                  times a           



                                                  day.           

 

     pantoprazol      2022      Yes            40mg Q.5D Take 1           [facility]

[facility]



     e         1-06                               tablet (40           st



     (PROTONIX)      00:00:                               mg total)           [facility]

[facility]



     40 MG EC      00                                 by mouth 2           l



     tablet                                         (two)           



                                                  times a           



                                                  day.           

 

     acetaminoph      2022- No        [id number] 1{tbl} Q.5D Take 1           

[facility]



     en-codeine                                tablet by           st



     (TYLENOL      00:00: 04:59                          mouth 2           [facility]

ta



     WITH      00   :00                           (two)           l



     CODEINE #3)                                         times a           



     300-30 mg                                         day for 10           



     per tablet                                         days           



                                                  .chronic           



                                                  pain.           

 

     acetaminoph      2022- No        [id number] 1{tbl} Q.5D Take 1           

[facility]



     en-codeine                                tablet by           st



     (TYLENOL      00:00: 04:59                          mouth 2           [facility]

ta



     WITH      00   :00                           (two)           l



     CODEINE #3)                                         times a           



     300-30 mg                                         day for 10           



     per tablet                                         days           



                                                  .chronic           



                                                  pain.           

 

     acetaminoph      2022- No        [id number] 1{tbl} Q.5D Take 1           

[facility]



     en-codeine                                tablet by           st



     (TYLENOL      00:00: 04:59                          mouth 2           [facility]

ta



     WITH      00   :00                           (two)           l



     CODEINE #3)                                         times a           



     300-30 mg                                         day for 10           



     per tablet                                         days           



                                                  .chronic           



                                                  pain.           

 

     acetaminoph      2022- No        84506 1{tbl} Q.5D Take 1           

[facility]



     en-codeine                                tablet by           st



     (TYLENOL      00:00: 00:00                          mouth 2           [facility]

ta



     WITH      00   :00                           (two)           l



     CODEINE #3)                                         times a           



     300-30 mg                                         day for 10           



     per tablet                                         days           



                                                  .acute           



                                                  pain.           

 

     acetaminoph      2022- No        01803 1{tbl} Q.5D Take 1           

[facility]



     en-codeine                                tablet by           st



     (TYLENOL      00:00: 00:00                          mouth 2           [facility]

ta



     WITH      00   :00                           (two)           l



     CODEINE #3)                                         times a           



     300-30 mg                                         day for 10           



     per tablet                                         days           



                                                  .acute           



                                                  pain.           

 

     acetaminoph      2022- No        51477 1{tbl} Q.5D Take 1           

[facility]



     en-codeine                                tablet by           st



     (TYLENOL      00:00: 00:00                          mouth 2           [facility]

ta



     WITH      00   :00                           (two)           l



     CODEINE #3)                                         times a           



     300-30 mg                                         day for 10           



     per tablet                                         days           



                                                  .acute           



                                                  pain.           

 

     methocarbam      2022- No             500mg Q.25D Take 500          

 [facility]



     oL        -                          mg by           st



     (ROBAXIN)      18:56: 00:00                          mouth 4           [facility]

[facility]



     500 MG      38   :00                           (four)           l



     tablet                                         times a           



                                                  day.           

 

     methocarbam      2022- No             500mg Q.25D Take 500          

 [facility]



     oL        -                          mg by           st



     (ROBAXIN)      18:56: 00:00                          mouth 4           [facility]

[facility]



     500 MG      38   :00                           (four)           l



     tablet                                         times a           



                                                  day.           

 

     methocarbam      -2022- No             500mg Q.25D Take 500          

 [facility]



     oL        -                          mg by           st



     (ROBAXIN)      18:56: 00:00                          mouth 4           [facility]

[facility]



     500 MG      38   :00                           (four)           l



     tablet                                         times a           



                                                  day.           

 

     apixaban      2022- No             2.5mg Q.5D Take 2.5           [facility]

[facility]



     (ELIQUIS) 5      -                          mg by           st



     mg tablet      18:51: 00:00                          mouth 2           [facility]

[facility]



               03   :00                           (two)           l



                                                  times a           



                                                  day.           

 

     apixaban      2022- No             2.5mg Q.5D Take 2.5           [facility]

[facility]



     (ELIQUIS) 5      -                          mg by           st



     mg tablet      18:51: 00:00                          mouth 2           Hosp

[facility]



               03   :00                           (two)           l



                                                  times a           



                                                  day.           

 

     apixaban      2022- No             2.5mg Q.5D Take 2.5           [facility]

[facility]



     (ELIQUIS) 5      -                          mg by           st



     mg tablet      18:51: 00:00                          mouth 2           Hosp

m[facility]



               03   :00                           (two)           l



                                                  times a           



                                                  day.           

 

     sevelamer      -0 - No             1600mg [id number] Take 1,600    

       [facility]



     (RENVELA)      -                     [id number] mg by           st



     800 mg      18:50: 00:00                     3D   mouth 3           [facility]osp[facility]



     tablet      58   :00                           (three)           l



                                                  times a           



                                                  day with           



                                                  meals.           

 

     sevelamer      -0 - No             1600mg [id number] Take 1,600    

       [facility]



     (RENVELA)                           [id number] mg by           st



     800 mg      18:50: 00:00                     3D   mouth 3           [facility]osp[facility]



     tablet      58   :00                           (three)           l



                                                  times a           



                                                  day with           



                                                  meals.           

 

     sevelamer      2022-0 2- No             1600mg Q.[id number] Take 1,600    

       [facility]



     (RENVELA)                           [id number] mg by           st



     800 mg      18:50: 00:00                     3D   mouth 3           [facility]



     tablet      58   :00                           (three)           l



                                                  times a           



                                                  day with           



                                                  meals.           

 

     oxyCODONE      -0 - No        [id number] 5mg  Q4H  Take 5 mg           M

[facility]



     (ROXICODONE                                by mouth           st



     ) 5 MG      18:50: 00:00                          every 4           [facility]osp[facility]



     immediate      30   :00                           (four)           l



     release                                         hours as           



     tablet                                         needed for           



                                                  moderate           



                                                  pain           



                                                  .acute           



                                                  pain.           

 

     oxyCODONE      202-0 - No        03608 5mg  Q4H  Take 5 mg           [facility]

[facility]



     (ROXICODONE                                by mouth           st



     ) 5 MG      18:50: 00:00                          every 4           [facility]



     immediate      30   :00                           (four)           l



     release                                         hours as           



     tablet                                         needed for           



                                                  moderate           



                                                  pain           



                                                  .acute           



                                                  pain.           

 

     oxyCODONE      2022-0 - No        [id number] 5mg  Q4H  Take 5 mg           [facility]

[facility]



     (ROXICODONE                                by mouth           st



     ) 5 MG      18:50: 00:00                          every 4           [facility]



     immediate      30   :00                           (four)           l



     release                                         hours as           



     tablet                                         needed for           



                                                  moderate           



                                                  pain           



                                                  .acute           



                                                  pain.           

 

     tiZANidine      2022-0      Yes            4mg  Q.5D Take 1           [facility]

di



     (ZANAFLEX)      9-01                               tablet (4           st



     4 MG tablet      00:00:                               mg total)           H

ospita



               00                                 by mouth 2           l



                                                  (two)           



                                                  times a           



                                                  day as           



                                                  needed for           



                                                  muscle           



                                                  spasms.           

 

     tiZANidine      2022-0      Yes            4mg  Q.5D Take 1           [facility]

di



     (ZANAFLEX)      9-01                               tablet (4           st



     4 MG tablet      00:00:                               mg total)           [facility]

ospita



               00                                 by mouth 2           l



                                                  (two)           



                                                  times a           



                                                  day as           



                                                  needed for           



                                                  muscle           



                                                  spasms.           

 

     tiZANidine      2022-0      Yes            4mg  Q.5D Take 1           [facility]

di



     (ZANAFLEX)      9-01                               tablet (4           st



     4 MG tablet      00:00:                               mg total)           H

ospita



               00                                 by mouth 2           l



                                                  (two)           



                                                  times a           



                                                  day as           



                                                  needed for           



                                                  muscle           



                                                  spasms.           

 

     promethazin      2022-0      Yes            25mg Q6H  Take 1           [facility]

[facility]



     e         8-01                               tablet (25           st



     (PHENERGAN)      00:00:                               mg total)           [facility]

ospita



     25 MG      00                                 by mouth           l



     tablet                                         every 6           



                                                  (six)           



                                                  hours as           



                                                  needed for           



                                                  vomiting           



                                                  or nausea.           

 

     promethazin      2022-0      Yes            25mg Q6H  Take 1           [facility]

farhana



     e         8-01                               tablet (25           st



     (PHENERGAN)      00:00:                               mg total)           [facility]

ospita



     25 MG      00                                 by mouth           l



     tablet                                         every 6           



                                                  (six)           



                                                  hours as           



                                                  needed for           



                                                  vomiting           



                                                  or nausea.           

 

     promethazin      2022-0      Yes            25mg Q6H  Take 1           [facility]

far[facility]ana



     e         8-01                               tablet (25           st



     (PHENERGAN)      00:00:                               mg total)           [facility]

[facility]



     25 MG      00                                 by mouth           l



     tablet                                         every 6           



                                                  (six)           



                                                  hours as           



                                                  needed for           



                                                  vomiting           



                                                  or nausea.           

 

     promethazin      2022-0      Yes            50mg Q.[id number] Take 1         

  [facility]



     e         7-28                          [id number] tablet (50           st



     (PHENERGAN)      00:00:                          3D   mg total)           [facility]

osp[facility]



     50 MG      00                                 by mouth 3           l



     tablet                                         (three)           



                                                  times a           



                                                  day as           



                                                  needed for           



                                                  nausea or           



                                                  vomiting.           

 

     promethazin      2022-0      Yes            50mg Q.[id number] Take 1         

  [facility]



     e         7-28                          [id number] tablet (50           st



     (PHENERGAN)      00:00:                          3D   mg total)           H

ospita



     50 MG      00                                 by mouth 3           l



     tablet                                         (three)           



                                                  times a           



                                                  day as           



                                                  needed for           



                                                  nausea or           



                                                  vomiting.           

 

     promethazin      2022-0      Yes            50mg Q.[id number] Take 1         

  [facility]



     e         7-28                          [id number] tablet (50           st



     (PHENERGAN)      00:00:                          3D   mg total)           H

ospita



     50 MG      00                                 by mouth 3           l



     tablet                                         (three)           



                                                  times a           



                                                  day as           



                                                  needed for           



                                                  nausea or           



                                                  vomiting.           

 

     diclofenac      2022-0      Yes            75mg Q.5D Take 1           [facility]

di



     (VOLTAREN)      7-26                               tablet (75           st



     75 MG EC      00:00:                               mg total)           [facility]

[facility]



     tablet      00                                 by mouth 2           l



                                                  (two)           



                                                  times a           



                                                  day.           

 

     diclofenac      0      Yes            75mg Q.5D Take 1           [facility]

di



     (VOLTAREN)      7-26                               tablet (75           st



     75 MG EC      00:00:                               mg total)           [facility]

[facility]



     tablet      00                                 by mouth 2           l



                                                  (two)           



                                                  times a           



                                                  day.           

 

     diclofenac      0      Yes            75mg Q.5D Take 1           [facility]

di



     (VOLTAREN)      7-26                               tablet (75           st



     75 MG EC      00:00:                               mg total)           [facility]

[facility]



     tablet      00                                 by mouth 2           l



                                                  (two)           



                                                  times a           



                                                  day.           

 

     fluconazole            Yes            150mg Q7D  Take 1           [facility]

[facility]



     (DIFLUCAN)      7-25                               tablet           st



     150 MG      00:00:                               (150 mg           [facility]osp[facility]



     tablet      00                                 total) by           l



                                                  mouth once           



                                                  a week. On           



                                                  Monday           

 

     fluconazole            Yes            150mg Q7D  Take 1           [facility]

[facility]



     (DIFLUCAN)      7-25                               tablet           st



     150 MG      00:00:                               (150 mg           [facility]



     tablet      00                                 total) by           l



                                                  mouth once           



                                                  a week. On           



                                                  Monday           

 

     fluconazole            Yes            150mg Q7D  Take 1           [facility]

[facility]



     (DIFLUCAN)      7-25                               tablet           st



     150 MG      00:00:                               (150 mg           [facility]



     tablet      00                                 total) by           l



                                                  mouth once           



                                                  a week. On           



                                                  Monday           

 

     DULoxetine      2022- No             60mg QD   Take 60 mg           

[facility]



     (CYMBALTA)                                by mouth           st



     60 MG      18:01: 00:00                          daily.           [facility]



     capsule      54   :00                                          l

 

     gabapentin      2022- No             300mg Q.5D Take 300           [facility]

[facility]



     (NEURONTIN)      -                          mg by           st



     100 mg      18:01: 00:00                          mouth 2           [facility]



     capsule      54   :00                           (two)           l



                                                  times a           



                                                  day.           

 

     allopurinoL      2022- No             100mg QD   Take 100           

[facility]



     (ZYLOPRIM)      -09                          mg by           st



     100 MG      18:01: 00:00                          mouth           [facility]



     tablet      54   :00                           daily.           l

 

     DULoxetine      -2022- No             60mg QD   Take 60 mg           

[facility]



     (CYMBALTA)                                by mouth           st



     60 MG      18:01: 00:00                          daily.           [facility]



     capsule      54   :00                                          l

 

     gabapentin      -2022- No             300mg Q.5D Take 300           [facility]

[facility]



     (NEURONTIN)      -                          mg by           st



     100 mg      18:01: 00:00                          mouth 2           [facility]



     capsule      54   :00                           (two)           l



                                                  times a           



                                                  day.           

 

     allopurinoL      -0 - No             100mg QD   Take 100           

[facility]



     (ZYLOPRIM)      -09                          mg by           st



     100 MG      18:01: 00:00                          mouth           [facility]



     tablet      54   :00                           daily.           l

 

     DULoxetine      2022- No             60mg QD   Take 60 mg           

[facility]



     (CYMBALTA)                                by mouth           st



     60 MG      18:01: 00:00                          daily.           [facility]



     capsule      54   :00                                          l

 

     gabapentin      -0 - No             300mg Q.5D Take 300           [facility]

[facility]



     (NEURONTIN)      -                          mg by           st



     100 mg      18:01: 00:00                          mouth 2           [facility]



     capsule      54   :00                           (two)           l



                                                  times a           



                                                  day.           

 

     allopurinoL      -0 - No             100mg QD   Take 100           

[facility]



     (ZYLOPRIM)      -                          mg by           st



     100 MG      18:01: 00:00                          mouth           [facility]



     tablet      54   :00                           daily.           l

 

     Zithromax      2-0      Yes                      See            [facility]



     Z-Mata 250      6-29                               Instructio           l



     mg oral      21:14:                               ns, Take 2           [facility]

daryl



     tablet      00                                 tablets by           



                                                  mouth the           



                                                  first day           



                                                  then 1           



                                                  tablet by           



                                                  mouth days           



                                                  2-5. (Pt           



                                                  is on           



                                                  hemodialys           



                                                  is)., X 5           



                                                  day, # 6           



                                                  tab, 0           



                                                  Refill(s),           



                                                  Pharmacy:           



                                                  [facility],           



                                                  170.18,           



                                                  cm,            



                                                  22           



                                                  14:52:00           



                                                  CDT,           



                                                  Height,           



                                                  106.818,           



                                                  kg,            



                                                  22           



                                                  14...           

 

     Zithromax      2022-0      Yes                      See            [facility]



     Z-Mata 250      6-29                               Instructio           l



     mg oral      21:14:                               ns, Take 2           [facility]

daryl



     tablet      00                                 tablets by           



                                                  mouth the           



                                                  first day           



                                                  then 1           



                                                  tablet by           



                                                  mouth days           



                                                  2-5. (Pt           



                                                  is on           



                                                  hemodialys           



                                                  is)., X 5           



                                                  day, # 6           



                                                  tab, 0           



                                                  Refill(s),           



                                                  Pharmacy:           



                                                  [facility],           



                                                  [id number],           



                                                  cm,            



                                                  22           



                                                  14:52:00           



                                                  CDT,           



                                                  Height,           



                                                  106.818,           



                                                  kg,            



                                                  22           



                                                  14...           

 

     Zithromax      2022-0      Yes                      See            [employer] 250      6-29                               Instructio           l



     mg oral      21:14:                               ns, Take 2           [facility]

daryl



     tablet      00                                 tablets by           



                                                  mouth the           



                                                  first day           



                                                  then 1           



                                                  tablet by           



                                                  mouth days           



                                                  2-5. (Pt           



                                                  is on           



                                                  hemodialys           



                                                  is)., X 5           



                                                  day, # 6           



                                                  tab, 0           



                                                  Refill(s),           



                                                  Pharmacy:           



                                                  [facility],           



                                                  [id number],           



                                                  cm,            



                                                  22           



                                                  14:52:00           



                                                  CDT,           



                                                  Height,           



                                                  106.818,           



                                                  kg,            



                                                  22           



                                                  14...           

 

     Zithromax      2022-0      Yes                      [facility]            [facility]      6-29                               Instructio           l



     mg oral      21:14:                               ns, Take 2           [facility]

daryl



     tablet      00                                 tablets by           



                                                  mouth the           



                                                  first day           



                                                  then 1           



                                                  tablet by           



                                                  mouth days           



                                                  2-5. (Pt           



                                                  is on           



                                                  hemodialys           



                                                  is)., X 5           



                                                  day, # 6           



                                                  tab, 0           



                                                  Refill(s),           



                                                  Pharmacy:           



                                                  [facility],           



                                                  170.18,           



                                                  cm,            



                                                  22           



                                                  14:52:00           



                                                  CDT,           



                                                  Height,           



                                                  106.818,           



                                                  kg,            



                                                  22           



                                                  14...           

 

     Zithromax      2022-0      Yes                      See            [facility]



     [employer] 250      6-29                               Instructio           l



     mg oral      21:14:                               ns, Take 2           [facility]

daryl



     tablet      00                                 tablets by           



                                                  mouth the           



                                                  first day           



                                                  then 1           



                                                  tablet by           



                                                  mouth days           



                                                  2-5. (Pt           



                                                  is on           



                                                  hemodialys           



                                                  is)., X 5           



                                                  day, # 6           



                                                  tab, 0           



                                                  Refill(s),           



                                                  Pharmacy:           



                                                  [facility],           



                                                  170.18,           



                                                  cm,            



                                                  22           



                                                  14:52:00           



                                                  CDT,           



                                                  Height,           



                                                  106.818,           



                                                  kg,            



                                                  22           



                                                  14...           

 

     Zithromax      2022-0      Yes                      [facility]            [facility]      6-29                               Instructio           l



     mg oral      21:14:                               ns, Take 2           [facility]

daryl



     tablet      00                                 tablets by           



                                                  mouth the           



                                                  first day           



                                                  then 1           



                                                  tablet by           



                                                  mouth days           



                                                  2-5. (Pt           



                                                  is on           



                                                  hemodialys           



                                                  is)., X 5           



                                                  day, # 6           



                                                  tab, 0           



                                                  Refill(s),           



                                                  Pharmacy:           



                                                  [facility],           



                                                  170.18,           



                                                  cm,            



                                                  22           



                                                  14:52:00           



                                                  CDT,           



                                                  Height,           



                                                  106.818,           



                                                  kg,            



                                                  22           



                                                  14...           

 

     Zithromax      2022-0      Yes                      See            [facility]



     [employer] 250      6-29                               Instructio           l



     mg oral      21:14:                               ns, Take 2           [facility]

daryl



     tablet      00                                 tablets by           



                                                  mouth the           



                                                  first day           



                                                  then 1           



                                                  tablet by           



                                                  mouth days           



                                                  2-5. (Pt           



                                                  is on           



                                                  hemodialys           



                                                  is)., X 5           



                                                  day, # 6           



                                                  tab, 0           



                                                  Refill(s),           



                                                  Pharmacy:           



                                                  [facility],           



                                                  170.18,           



                                                  cm,            



                                                  22           



                                                  14:52:00           



                                                  CDT,           



                                                  Height,           



                                                  106.818,           



                                                  kg,            



                                                  22           



                                                  14...           

 

     Zithromax      2022-0      Yes                      See            [employer] 250      6-29                               Instructio           l



     mg oral      21:14:                               ns, Take 2           H[facility]

daryl



     tablet      00                                 tablets by           



                                                  mouth the           



                                                  first day           



                                                  then 1           



                                                  tablet by           



                                                  mouth days           



                                                  2-5. (Pt           



                                                  is on           



                                                  hemodialys           



                                                  is)., X 5           



                                                  day, # 6           



                                                  tab, 0           



                                                  Refill(s),           



                                                  Pharmacy:           



                                                  [facility],           



                                                  170.18,           



                                                  cm,            



                                                  22           



                                                  14:52:00           



                                                  CDT,           



                                                  Height,           



                                                  106.818,           



                                                  kg,            



                                                  22           



                                                  14...           

 

     Zithromax      2022-0      Yes                      See            [employer] 250      6-29                               Instructio           l



     mg oral      21:14:                               ns, Take 2           [facility]

daryl



     tablet      00                                 tablets by           



                                                  mouth the           



                                                  first day           



                                                  then 1           



                                                  tablet by           



                                                  mouth days           



                                                  2-5. (Pt           



                                                  is on           



                                                  hemodialys           



                                                  is)., X 5           



                                                  day, # 6           



                                                  tab, 0           



                                                  Refill(s),           



                                                  Pharmacy:           



                                                  [facility],           



                                                  170.18,           



                                                  cm,            



                                                  22           



                                                  14:52:00           



                                                  CDT,           



                                                  Height,           



                                                  106.818,           



                                                  kg,            



                                                  22           



                                                  14...           

 

     Velphoro      2022-0      Yes                      500 mg,           [facility]

a



               6-29                               CHEW,           l



               20:13:                               Daily,           [facility]



               00                                 take with           



                                                  food, 0           



                                                  Refill(s)           

 

     Velphoro      2022-0      Yes                      500 mg,           [facility]

a



               6-29                               CHEW,           l



               20:13:                               Daily,           [facility]



               00                                 take with           



                                                  food, 0           



                                                  Refill(s)           

 

     Velphoro      2022-0      Yes                      500 mg,           [facility]

a



               6-29                               CHEW,           l



               20:13:                               Daily,           [facility]



               00                                 take with           



                                                  food, 0           



                                                  Refill(s)           

 

     Velphoro      2022-0      Yes                      500 mg,           [facility]

a



               6-29                               CHEW,           l



               20:13:                               Daily,           [facility]



               00                                 take with           



                                                  food, 0           



                                                  Refill(s)           

 

     Velphoro      2022-0      Yes                      500 mg,           [facility]

a



               6-29                               CHEW,           l



               20:13:                               Daily,                                            take with           



                                                  food, 0           



                                                  Refill(s)           

 

     Velphoro      2022-0      Yes                      500 mg,           [facility]

a



               6-29                               CHEW,           l



               20:13:                               Daily,                                            take with           



                                                  food, 0           



                                                  Refill(s)           

 

     Velphoro      2022-0      Yes                      500 mg,           [facility]

a



               6-29                               CHEW,           l



               20:13:                               Daily,                                            take with           



                                                  food, 0           



                                                  Refill(s)           

 

     Velphoro      2022-0      Yes                      500 mg,           [facility]

a



               6-29                               CHEW,           l



               20:13:                               Daily,                                            take with           



                                                  food, 0           



                                                  Refill(s)           

 

     Velphoro      2022-0      Yes                      500 mg,           [facility]

a



               6-29                               CHEW,           l



               20:13:                               Daily,                                            take with           



                                                  food, 0           



                                                  Refill(s)           

 

     sevelamer      2022-0      Yes                      2,400 mg =           [facility]

[facility]



     carbonate      6-29                               3 tab, PO,           l



     800 mg oral      20:12:                               [facility],           

[facility]



     tablet      00                                 # 270 tab,           



                                                  0              



                                                  Refill(s)           

 

     sevelamer      2022-0      Yes                      2,400 mg =           Me

[facility]



     carbonate      6-29                               3 tab, PO,           l



     800 mg oral      20:12:                               [facility]als,           

[facility]



     tablet      00                                 # 270 tab,           



                                                  0              



                                                  Refill(s)           

 

     sevelamer      2022-0      Yes                      2,400 mg =           [facility]

[facility]



     carbonate      6-29                               3 tab, PO,           l



     800 mg oral      20:12:                               [facility]als,           

[facility]



     tablet      00                                 # 270 tab,           



                                                  0              



                                                  Refill(s)           

 

     sevelamer      2022-0      Yes                      2,400 mg =           Me

[facility]



     carbonate      6-29                               3 tab, PO,           l



     800 mg oral      20:12:                               TID[facility]als,           

[facility]



     tablet      00                                 # 270 tab,           



                                                  0              



                                                  Refill(s)           

 

     sevelamer      2022-0      Yes                      2,400 mg =           Me

[facility]



     carbonate      6-29                               3 tab, PO,           l



     800 mg oral      20:12:                               TID[facility]als,           

[facility]



     tablet      00                                 # 270 tab,           



                                                  0              



                                                  Refill(s)           

 

     sevelamer      2022-0      Yes                      2,400 mg =           [facility]

[facility]



     carbonate      6-29                               3 tab, PO,           l



     800 mg oral      20:12:                               TID[facility]als,           

[facility]



     tablet      00                                 # 270 tab,           



                                                  0              



                                                  Refill(s)           

 

     sevelamer      2022-0      Yes                      2,400 mg =           Me

[facility]



     carbonate      6-29                               3 tab, PO,           l



     800 mg oral      20:12:                               TID[facility]als,           

[facility]



     tablet      00                                 # 270 tab,           



                                                  0              



                                                  Refill(s)           

 

     sevelamer      2022-0      Yes                      2,400 mg =           [facility]

[facility]



     carbonate      6-29                               3 tab, PO,           l



     800 mg oral      20:12:                               TID[facility]als,           

[facility]



     tablet      00                                 # 270 tab,           



                                                  0              



                                                  Refill(s)           

 

     sevelamer      2022-0      Yes                      2,400 mg =           Me

[facility]



     carbonate      6-29                               3 tab, PO,           l



     800 mg oral      20:12:                               TID[facility]als,           

[facility]



     tablet      00                                 # 270 tab,           



                                                  0              



                                                  Refill(s)           

 

     pantoprazol      2022-0      Yes                      40 mg = 1           M

[facility]



     e 40 mg      6-29                               tab, PO,           l



     oral      20:11:                               Daily, 0           [facility]



     enteric      00                                 Refill(s)           



     coated                                                        



     tablet                                                        

 

     pantoprazol      2022-0      Yes                      40 mg = 1           M

[facility]



     e 40 mg      6-29                               tab, PO,           l



     oral      20:11:                               Daily, 0           [facility]



     enteric      00                                 Refill(s)           



     coated                                                        



     tablet                                                        

 

     pantoprazol      2022-0      Yes                      40 mg = 1           M

[facility]



     e 40 mg      6-29                               tab, PO,           l



     oral      20:11:                               Daily, 0           [facility]



     enteric      00                                 Refill(s)           



     coated                                                        



     tablet                                                        

 

     pantoprazol      2022-0      Yes                      40 mg = 1           M

[facility]



     e 40 mg      6-29                               tab, PO,           l



     oral      20:11:                               Daily, 0           [facility]



     enteric      00                                 Refill(s)           



     coated                                                        



     tablet                                                        

 

     pantoprazol      2022-0      Yes                      40 mg = 1           M

[facility]



     e 40 mg      6-29                               tab, PO,           l



     oral      20:11:                               Daily, 0           [facility]



     enteric      00                                 Refill(s)           



     coated                                                        



     tablet                                                        

 

     pantoprazol      2022-0      Yes                      40 mg = 1           M

[facility]



     e 40 mg      6-29                               tab, PO,           l



     oral      20:11:                               Daily, 0           [facility]



     enteric      00                                 Refill(s)           



     coated                                                        



     tablet                                                        

 

     pantoprazol      2022-0      Yes                      40 mg = 1           M

[facility]



     e 40 mg      6-29                               tab, PO,           l



     oral      20:11:                               Daily, 0           [facility]



     enteric      00                                 Refill(s)           



     coated                                                        



     tablet                                                        

 

     pantoprazol      2022-0      Yes                      40 mg = 1           [facility]

[facility]



     e 40 mg      6-29                               tab, PO,           l



     oral      20:11:                               Daily, 0           [facility]



     enteric      00                                 Refill(s)           



     coated                                                        



     tablet                                                        

 

     pantoprazol            Yes                      40 mg = 1           [facility]

[facility]



     e 40 mg      6-29                               tab, PO,           l



     oral      20:11:                               Daily, 0           [facility]



     enteric      00                                 Refill(s)           



     coated                                                        



     tablet                                                        

 

     oxyCODONE 5      0      Yes                      5 mg = 1           [facility]

[facility]



     mg oral      6-29                               tab, PO,           l



     tablet,      20:10:                               Q4H, PRN           [facility]

n



     immediate      00                                 Pain, prn,           



     release                                         0              



                                                  Refill(s)           

 

     oxyCODONE 5      0      Yes                      5 mg = 1           [facility]

[facility]



     mg oral      6-29                               tab, PO,           l



     tablet,      20:10:                               Q4H, PRN           [facility]

n



     immediate      00                                 Pain, prn,           



     release                                         0              



                                                  Refill(s)           

 

     oxyCODONE 5      0      Yes                      5 mg = 1           [facility]

[facility]



     mg oral      6-29                               tab, PO,           l



     tablet,      20:10:                               Q4H, PRN           [facility]

n



     immediate      00                                 Pain, prn,           



     release                                         0              



                                                  Refill(s)           

 

     oxyCODONE 5      0      Yes                      5 mg = 1           [facility]

[facility]



     mg oral      6-29                               tab, PO,           l



     tablet,      20:10:                               Q4H, PRN           [facility]

n



     immediate      00                                 Pain, prn,           



     release                                         0              



                                                  Refill(s)           

 

     oxyCODONE 5      -0      Yes                      5 mg = 1           [facility]

[facility]



     mg oral      6-29                               tab, PO,           l



     tablet,      20:10:                               Q4H, PRN           [facility]

n



     immediate      00                                 Pain, prn,           



     release                                         0              



                                                  Refill(s)           

 

     oxyCODONE 5      0      Yes                      5 mg = 1           [facility]

[facility]



     mg oral      6-29                               tab, PO,           l



     tablet,      20:10:                               Q4H, PRN           [facility]

n



     immediate      00                                 Pain, prn,           



     release                                         0              



                                                  Refill(s)           

 

     oxyCODONE 5      -0      Yes                      5 mg = 1           [facility]

[facility]



     mg oral      6-29                               tab, PO,           l



     tablet,      20:10:                               Q4H, PRN           [facility]

n



     immediate      00                                 Pain, prn,           



     release                                         0              



                                                  Refill(s)           

 

     oxyCODONE 5      0      Yes                      5 mg = 1           [facility]

[facility]



     mg oral      6-29                               tab, PO,           l



     tablet,      20:10:                               Q4H, PRN           [facility]

n



     immediate      00                                 Pain, prn,           



     release                                         0              



                                                  Refill(s)           

 

     oxyCODONE 5      -0      Yes                      5 mg = 1           [facility]

[facility]



     mg oral      6-29                               tab, PO,           l



     tablet,      20:10:                               Q4H, PRN           [facility]

n



     immediate      00                                 Pain, prn,           



     release                                         0              



                                                  Refill(s)           

 

     ondansetron      2022-0      Yes                      4 mg = 1           [facility]

[facility]



     4 mg oral      6-29                               tab, PO,           l



     tablet,      20:09:                               TID, PRN           [facility]

n



     disintegrat      00                                 Nausea /           



     ing                                          Vomiting,           



                                                  Dissolve           



                                                  tab under           



                                                  tongue, 0           



                                                  Refill(s)           

 

     ondansetron      2022-0      Yes                      4 mg = 1           [facility]

[facility]



     4 mg oral      6-29                               tab, PO,           l



     tablet,      20:09:                               TID, PRN           [facility]

n



     disintegrat      00                                 Nausea /           



     ing                                          Vomiting,           



                                                  Dissolve           



                                                  tab under           



                                                  tongue, 0           



                                                  Refill(s)           

 

     ondansetron      2022-0      Yes                      4 mg = 1           [facility]

[facility]



     4 mg oral      6-29                               tab, PO,           l



     tablet,      20:09:                               TID, PRN           [facility]

n



     disintegrat      00                                 Nausea /           



     ing                                          Vomiting,           



                                                  Dissolve           



                                                  tab under           



                                                  tongue, 0           



                                                  Refill(s)           

 

     ondansetron      2022-0      Yes                      4 mg = 1           [facility]

[facility]



     4 mg oral      6-29                               tab, PO,           l



     tablet,      20:09:                               TID, PRN           [facility]

n



     disintegrat      00                                 Nausea /           



     ing                                          Vomiting,           



                                                  Dissolve           



                                                  tab under           



                                                  tongue, 0           



                                                  Refill(s)           

 

     ondansetron      2022-0      Yes                      4 mg = 1           [facility]

[facility]



     4 mg oral      6-29                               tab, PO,           l



     tablet,      20:09:                               TID, PRN           [facility]

n



     disintegrat      00                                 Nausea /           



     ing                                          Vomiting,           



                                                  Dissolve           



                                                  tab under           



                                                  tongue, 0           



                                                  Refill(s)           

 

     ondansetron      2022-0      Yes                      4 mg = 1           [facility]

[facility]



     4 mg oral      6-29                               tab, PO,           l



     tablet,      20:09:                               TID, PRN           [facility]

n



     disintegrat      00                                 Nausea /           



     ing                                          Vomiting,           



                                                  Dissolve           



                                                  tab under           



                                                  tongue, 0           



                                                  Refill(s)           

 

     ondansetron      2022-0      Yes                      4 mg = 1           [facility]

[facility]



     4 mg oral      6-29                               tab, PO,           l



     tablet,      20:09:                               TID, PRN           [facility]

n



     disintegrat      00                                 Nausea /           



     ing                                          Vomiting,           



                                                  Dissolve           



                                                  tab under           



                                                  tongue, 0           



                                                  Refill(s)           

 

     ondansetron      -0      Yes                      4 mg = 1           [facility]

[facility]



     4 mg oral      6-29                               tab, PO,           l



     tablet,      20:09:                               TID, PRN           [facility]

n



     disintegrat      00                                 Nausea /           



     ing                                          Vomiting,           



                                                  Dissolve           



                                                  tab under           



                                                  tongue, 0           



                                                  Refill(s)           

 

     ondansetron      -0      Yes                      4 mg = 1           [facility]

[facility]



     4 mg oral      6-29                               tab, PO,           l



     tablet,      20:09:                               TID, PRN           [facility]

n



     disintegrat      00                                 Nausea /           



     ing                                          Vomiting,           



                                                  Dissolve           



                                                  tab under           



                                                  tongue, 0           



                                                  Refill(s)           

 

     Nitazoxanid      -0      Yes                      500 mg = 1           

[facility]



     e 500 mg      6-29                               tab, PO,           l



     oral tablet      20:08:                               Q12H, 0           [facility]

[facility]



               00                                 Refill(s)           

 

     Nitazoxanid      -0      Yes                      500 mg = 1           

[facility]



     e 500 mg      6-29                               tab, PO,           l



     oral tablet      20:08:                               Q12H, 0           [facility]

[facility]



               00                                 Refill(s)           

 

     Nitazoxanid      -0      Yes                      500 mg = 1           

[facility]



     e 500 mg      6-29                               tab, PO,           l



     oral tablet      20:08:                               Q12H, 0           [facility]

[facility]



               00                                 Refill(s)           

 

     Nitazoxanid      -0      Yes                      500 mg = 1           

[facility]



     e 500 mg      6-29                               tab, PO,           l



     oral tablet      20:08:                               Q12H, 0           [facility]

[facility]



               00                                 Refill(s)           

 

     Nitazoxanid      -0      Yes                      500 mg = 1           

[facility]



     e 500 mg      6-29                               tab, PO,           l



     oral tablet      20:08:                               Q12H, 0           [facility]

[facility]



               00                                 Refill(s)           

 

     Nitazoxanid      -0      Yes                      500 mg = 1           

[facility]



     e 500 mg      6-29                               tab, PO,           l



     oral tablet      20:08:                               Q12H, 0           [facility]

[facility]



               00                                 Refill(s)           

 

     Nitazoxanid      -0      Yes                      500 mg = 1           

[facility]



     e 500 mg      6-29                               tab, PO,           l



     oral tablet      20:08:                               Q12H, 0           [facility]

[facility]



               00                                 Refill(s)           

 

     Nitazoxanid      -0      Yes                      500 mg = 1           

[facility]



     e 500 mg      6-29                               tab, PO,           l



     oral tablet      20:08:                               Q12H, 0           [facility]

[facility]



               00                                 Refill(s)           

 

     Nitazoxanid      2022-0      Yes                      500 mg = 1           

[facility]



     e 500 mg      6-29                               tab, PO,           l



     oral tablet      20:08:                               Q12H, 0           [facility]

[facility]



               00                                 Refill(s)           

 

     metoprolol      -0      Yes                      25 mg = 1           [facility]

[facility]



     tartrate 25      6-29                               tab, PO,           l



     mg oral      20:07:                               BID, 0           [facility]



     tablet      00                                 Refill(s)           

 

     metoprolol      -0      Yes                      25 mg = 1           [facility]

[facility]



     tartrate 25      6-29                               tab, PO,           l



     mg oral      20:07:                               BID, 0           [facility]



     tablet      00                                 Refill(s)           

 

     metoprolol      -0      Yes                      25 mg = 1           [facility]

[facility]



     tartrate 25      6-29                               tab, PO,           l



     mg oral      20:07:                               BID, 0           [facility]



     tablet      00                                 Refill(s)           

 

     metoprolol      -0      Yes                      25 mg = 1           [facility]

[facility]



     tartrate 25      6-29                               tab, PO,           l



     mg oral      20:07:                               BID, 0           [facility]



     tablet      00                                 Refill(s)           

 

     metoprolol      -0      Yes                      25 mg = 1           [facility]

[facility]



     tartrate 25      6-29                               tab, PO,           l



     mg oral      20:07:                               BID, 0           [facility]



     tablet      00                                 Refill(s)           

 

     metoprolol      -0      Yes                      25 mg = 1           [facility]

[facility]



     tartrate 25      6-29                               tab, PO,           l



     mg oral      20:07:                               BID, 0           [facility]



     tablet      00                                 Refill(s)           

 

     metoprolol      -0      Yes                      25 mg = 1           [facility]

[facility]



     tartrate 25      6-29                               tab, PO,           l



     mg oral      20:07:                               BID, 0           [facility]



     tablet      00                                 Refill(s)           

 

     metoprolol      -0      Yes                      25 mg = 1           [facility]

[facility]



     tartrate 25      6-29                               tab, PO,           l



     mg oral      20:07:                               BID, 0           [facility]



     tablet      00                                 Refill(s)           

 

     metoprolol      -0      Yes                      25 mg = 1           [facility]

[facility]



     tartrate 25      6-29                               tab, PO,           l



     mg oral      20:07:                               BID, 0           [facility]



     tablet      00                                 Refill(s)           

 

     methocarbam      -0      Yes                      250 mg =           [facility]

[facility]



     ol 500 mg      6-29                               0.5 tab,           l



     oral tablet      20:05:                               PO, TID, 0           

[facility]



               00                                 Refill(s)           

 

     methocarbam      -0      Yes                      250 mg =           [facility]

[facility]



     ol 500 mg      6-29                               0.5 tab,           l



     oral tablet      20:05:                               PO, TID, 0           

[facility]                                 Refill(s)           

 

     methocarbam      2-0      Yes                      250 mg =           [facility]

[facility]



     ol 500 mg      6-29                               0.5 tab,           l



     oral tablet      20:05:                               PO, TID, 0           

[facility]



               00                                 Refill(s)           

 

     methocarbam      2022-0      Yes                      250 mg =           [facility]

[facility]



     ol 500 mg      6-29                               0.5 tab,           l



     oral tablet      20:05:                               PO, TID, 0           

[facility]



               00                                 Refill(s)           

 

     methocarbam      2-0      Yes                      250 mg =           [facility]

[facility]



     ol 500 mg      6-29                               0.5 tab,           l



     oral tablet      20:05:                               PO, TID, 0           

[facility]



               00                                 Refill(s)           

 

     methocarbam      2-0      Yes                      250 mg =           [facility]

[facility]



     ol 500 mg      6-29                               0.5 tab,           l



     oral tablet      20:05:                               PO, TID, 0           

[facility]                                 Refill(s)           

 

     methocarbam      2-0      Yes                      250 mg =           [facility]

[facility]



     ol 500 mg      6-29                               0.5 tab,           l



     oral tablet      20:05:                               PO, TID, 0           

[facility]                                 Refill(s)           

 

     methocarbam      2-0      Yes                      250 mg =           [facility]

[facility]



     ol 500 mg      6-29                               0.5 tab,           l



     oral tablet      20:05:                               PO, TID, 0           

[facility]                                 Refill(s)           

 

     methocarbam      -0      Yes                      250 mg =           [facility]

[facility]



     ol 500 mg      6-29                               0.5 tab,           l



     oral tablet      20:05:                               PO, TID, 0           

[facility]                                 Refill(s)           

 

     Alphagan P      2-0      Yes                      1 drp,           [facility]

[facility]



     0.1%      6-29                               OPTH, Q12H           l



     ophthalmic      20:04:                                              [facility]



     solution                                                      

 

     folic acid      -0      Yes                      1 mg, PO,           [facility]

[facility]



               6-29                               Daily, 0           l



               20:04:                               Refill(s)                                                           

 

     Alphagan P      2-0      Yes                      1 drp,           [facility]

[facility]



     0.1%      6-29                               OPTH, Q12H           l



     ophthalmic      20:04:                                              [facility]



     solution                                                      

 

     folic acid      2-0      Yes                      1 mg, PO,           [facility]

[facility]



               6-29                               Daily, 0           l



               20:04:                               Refill(s)                                                           

 

     Alphagan P      2022-0      Yes                      1 drp,           [facility]

[facility]



     0.1%      6-29                               OPTH, Q12H           l



     ophthalmic      20:04:                                              [facility]



     solution      00                                                

 

     folic acid      -0      Yes                      1 mg, PO,           [facility]

[facility]



               6-29                               Daily, 0           l



               20:04:                               Refill(s)           [facility]



                                                               

 

     Alphagan P      -0      Yes                      1 drp,           [facility]

[facility]



     0.1%      6-29                               OPTH, Q12H           l



     ophthalmic      20:04:                                              [facility]



     solution      00                                                

 

     folic acid      -0      Yes                      1 mg, PO,           [facility]

[facility]



               6-29                               Daily, 0           l



               20:04:                               Refill(s)           [facility]



                                                               

 

     Alphagan P      -0      Yes                      1 drp,           [facility]

[facility]



     0.1%      -                               OPTH, Q12H           l



     ophthalmic      20:04:                                              [facility]



     solution      00                                                

 

     folic acid      -0      Yes                      1 mg, PO,           [facility]

[facility]



               6-29                               Daily, 0           l



               20:04:                               Refill(s)           [facility]



                                                               

 

     Alphagan P      -0      Yes                      1 drp,           [facility]

[facility]



     0.1%      -                               OPTH, Q12H           l



     ophthalmic      20:04:                                              [facility]



     solution      00                                                

 

     folic acid      -0      Yes                      1 mg, PO,           [facility]

[facility]



               6-29                               Daily, 0           l



               20:04:                               Refill(s)           [facility]



                                                               

 

     Alphagan P      -0      Yes                      1 drp,           [facility]

[facility]



     0.1%      -                               OPTH, Q12H           l



     ophthalmic      20:04:                                              [facility]



     solution      00                                                

 

     folic acid      -0      Yes                      1 mg, PO,           [facility]

[facility]



               6-29                               Daily, 0           l



               20:04:                               Refill(s)           [facility]



                                                               

 

     Alphagan P      -0      Yes                      1 drp,           [facility]

[facility]



     0.1%      -                               OPTH, Q12H           l



     ophthalmic      20:04:                                              [facility]



     solution      00                                                

 

     folic acid      -0      Yes                      1 mg, PO,           [facility]

[facility]



               6-29                               Daily, 0           l



               20:04:                               Refill(s)           [facility]



                                                               

 

     Alphagan P      -0      Yes                      1 drp,           [facility]

[facility]



     0.1%      6-29                               OPTH, Q12H           l



     ophthalmic      20:04:                                              [facility]



     solution      00                                                

 

     folic acid      -0      Yes                      1 mg, PO,           [facility]

[facility]



               6-29                               Daily, 0           l



               20:04:                               Refill(s)           [facility]



               00                                                

 

     Oxygen      -0      Yes                      1 btl,           [facility]



               6-29                               MISC,           l



               20:03:                               Daily, 2           [facility]



               00                                 liters, 0           



                                                  Refill(s)           

 

     Oxygen      -0      Yes                      1 btl,           [facility]



               6-29                               MISC,           l



               20:03:                               Daily, 2           [facility]



               00                                 liters, 0           



                                                  Refill(s)           

 

     Oxygen      -0      Yes                      1 btl,           [facility]



               6-29                               MISC,           l



               20:03:                               Daily, 2           [facility]



               00                                 liters, 0           



                                                  Refill(s)           

 

     Oxygen      -0      Yes                      1 btl,           [facility]



               6-29                               MISC,           l



               20:03:                               Daily, 2           [facility]



               00                                 liters, 0           



                                                  Refill(s)           

 

     Oxygen      -0      Yes                      1 btl,           [facility]



               6-29                               MISC,           l



               20:03:                               Daily, 2           [facility]



               00                                 liters, 0           



                                                  Refill(s)           

 

     Oxygen      -0      Yes                      1 btl,           [facility]



               6-29                               MISC,           l



               20:03:                               Daily, 2           [facility]



               00                                 liters, 0           



                                                  Refill(s)           

 

     Oxygen      -0      Yes                      1 btl,           [facility]



               6-29                               MISC,           l



               20:03:                               Daily, 2           [facility]



               00                                 liters, 0           



                                                  Refill(s)           

 

     Oxygen      -0      Yes                      1 btl,           [facility]



               6-29                               MISC,           l



               20:03:                               Daily, 2           [facility]



               00                                 liters, 0           



                                                  Refill(s)           

 

     Oxygen      -0      Yes                      1 btl,           [facility]



               6-29                               MISC,           l



               20:03:                               Daily, 2           [facility]



               00                                 liters, 0           



                                                  Refill(s)           

 

     Eliquis 2.5      -0      Yes                      2.5 mg,           [facility]

[facility]



     mg oral      6-29                               PO, Q12H,           l



     tablet      20:02:                               tab, 0           [facility]



               00                                 Refill(s)           

 

     Eliquis 2.5      -0      Yes                      2.5 mg,           [facility]

[facility]



     mg oral      6-29                               PO, Q12H,           l



     tablet      20:02:                               tab, 0           [facility]



               00                                 Refill(s)           

 

     Eliquis 2.5      -0      Yes                      2.5 mg,           [facility]

[facility]



     mg oral      6-29                               PO, Q12H,           l



     tablet      20:02:                               tab, 0           [facility]



               00                                 Refill(s)           

 

     Eliquis 2.5      -0      Yes                      2.5 mg,           [facility]

[facility]



     mg oral      6-29                               PO, Q12H,           l



     tablet      20:02:                               tab, 0           [facility]



               00                                 Refill(s)           

 

     Eliquis 2.5      -0      Yes                      2.5 mg,           [facility]

[facility]



     mg oral      6-29                               PO, Q12H,           l



     tablet      20:02:                               tab, 0           [facility]



               00                                 Refill(s)           

 

     Eliquis 2.5      -0      Yes                      2.5 mg,           [facility]

[facility]



     mg oral      6-29                               PO, Q12H,           l



     tablet      20:02:                               tab, 0           [facility]



               00                                 Refill(s)           

 

     Eliquis 2.5      -0      Yes                      2.5 mg,           [facility]

[facility]



     mg oral      6-29                               PO, Q12H,           l



     tablet      20:02:                               tab, 0           [facility]



               00                                 Refill(s)           

 

     Eliquis 2.5      -0      Yes                      2.5 mg,           [facility]

[facility]



     mg oral      6-29                               PO, Q12H,           l



     tablet      20:02:                               tab, 0           [facility]



               00                                 Refill(s)           

 

     Eliquis 2.5      -0      Yes                      2.5 mg,           [facility]

[facility]



     mg oral      6-29                               PO, Q12H,           l



     tablet      20:02:                               tab, 0           [facility]



               00                                 Refill(s)           

 

     doxycycline      2022- No             200mg Q.5D Take 200           

[facility]



     (VIBRAMYCIN      - 06-25                          mg by           



     ) 100 MG      13:07: 00:00                          mouth 2           [facility]

[facility]



     capsule      01   :00                           (two)           l



                                                  times a           



                                                  day. Take           



                                                  200mg (x2           



                                                  100mg           



                                                  capsules).           



                                                  Per            



                                                  patient           



                                                  started           



                                                  taking           



                                                  2022           

 

     glucosam/ch      2022- No             1{tbl} Q.5D Take 1           [facility]

[facility]



     on-msm1/C/m                                tablet by           st



     ang/evelia      13:07: 00:00                          mouth 2           [facility]

ta



     (OSTEO      01   :00                           (two)           l



     BI-FLEX                                         times a           



     TRIPLE                                         day.           



     STRENGTH                                                        



     ORAL)                                                        

 

     NON       2022- No             1{capsu QD   Take 1           [facility]



     FORMULARY      -25                le}       capsule by           st



               13:07: 00:00                          mouth           [facility]



               01   :00                           nightly.           l



                                                  Patient           



                                                  takes           



                                                  young           



                                                  living           



                                                  probiotic           



                                                  with 17           



                                                  billion           



                                                  active           



                                                  cultures           

 

     doxycycline      2022- No             200mg Q.5D Take 200           

[facility]



     (VIBRAMYCIN      6-26 06-25                          mg by           st



     ) 100 MG      13:07: 00:00                          mouth 2           [facility]

ta



     capsule      01   :00                           (two)           l



                                                  times a           



                                                  day. Take           



                                                  200mg (x2           



                                                  100mg           



                                                  capsules).           



                                                  Per            



                                                  patient           



                                                  started           



                                                  taking           



                                                  2022           

 

     glucosam/ch      2022- No             1{tbl} Q.5D Take 1           [facility]

[facility]



     on-msm1/C/m                                tablet by           st



     ang/bos      13:07: 00:00                          mouth 2           [facility]

ta



     (OSTEO      01   :00                           (two)           l



     BI-FLEX                                         times a           



     TRIPLE                                         day.           



     STRENGTH                                                        



     ORAL)                                                        

 

     NON       2022- No             1{capsu QD   Take 1           [facility]



     FORMULARY      -25                le}       capsule by           st



               13:07: 00:00                          mouth           [facility]



               01   :00                           nightly.           l



                                                  Patient           



                                                  takes           



                                                  young           



                                                  living           



                                                  probiotic           



                                                  with 17           



                                                  billion           



                                                  active           



                                                  cultures           

 

     doxycycline      2022- No             200mg Q.5D Take 200           

[facility]



     (VIBRAMYCIN      6-26 06-25                          mg by           st



     ) 100 MG      13:07: 00:00                          mouth 2           [facility]

ta



     capsule      01   :00                           (two)           l



                                                  times a           



                                                  day. Take           



                                                  200mg (x2           



                                                  100mg           



                                                  capsules).           



                                                  Per            



                                                  patient           



                                                  started           



                                                  taking           



                                                  2022           

 

     glucosam/ch      2022- No             1{tbl} Q.5D Take 1           [facility]

[facility]



     [place] [place]                                [place]t by           [facility]



     ang/[facility]      13:07: 00:00                          mouth 2           [facility]

ta



     (OSTEO      01   :00                           (two)           l



     BI-FLEX                                         times a           



     TRIPLE                                         day.           



     STRENGTH                                                        



     ORAL)                                                        

 

     NON       2022- No             1{capsu QD   Take 1           [facility]



     FORMULARY      -25                le}       capsule by           st



               13:07: 00:00                          mouth           [facility]



               01   :00                           nightly.           l



                                                  Patient           



                                                  takes           



                                                  young           



                                                  living           



                                                  probiotic           



                                                  with 17           



                                                  billion           



                                                  active           



                                                  cultures           

 

     folic acid      -2022- No             1mg  QD   Take 1           [facility]

[facility]



     (FOLVITE) 1      --                          tablet (1           st



     MG tablet      00:00: 04:59                          mg total)           [facility]

[facility]



               00   :00                           by mouth           l



                                                  daily for           



                                                  30 days.           

 

     folic acid      2022- No             1mg  QD   Take 1           [facility]

[facility]



     (FOLVITE) 1      --                          tablet (1           st



     MG tablet      00:00: 04:59                          mg total)           [facility]

[facility]



               00   :00                           by mouth           l



                                                  daily for           



                                                  30 days.           

 

     folic acid      2022- No             1mg  QD   Take 1           [facility]

[facility]



     (FOLVITE) 1                                tablet (1           st



     MG tablet      00:00: 04:59                          mg total)           [facility]

[facility]



               00   :00                           by mouth           l



                                                  daily for           



                                                  30 days.           

 

     methocarbam      2022- No             250mg [id number] Take 0.5     

      [facility]



     oL                             [id number] tablets           st



     (ROBAXIN)      00:00: 04:59                     3D   (250 mg           [facility]

[facility]



     500 MG      00   :00                           total) by           l



     tablet                                         mouth 3           



                                                  (three)           



                                                  times a           



                                                  day for 7           



                                                  days.           

 

     nitazoxanid      -2022- No             500mg Q.5D Take 1           [facility]

[facility]



     e (ALINIA)                                tablet           st



     500 MG      00:00: 04:59                          (500 mg           [facility]



     tablet      00   :00                           total) by           l



                                                  mouth 2           



                                                  (two)           



                                                  times a           



                                                  day for 7           



                                                  days.           

 

     methocarbam      2022- No             250mg [id number] Take 0.5     

      [facility]



     oL                             [id number] tablets           st



     (ROBAXIN)      00:00: 04:59                     3D   (250 mg           H[facility]

[facility]



     500 MG      00   :00                           total) by           l



     tablet                                         mouth 3           



                                                  (three)           



                                                  times a           



                                                  day for 7           



                                                  days.           

 

     nitazoxanid      -2022- No             500mg Q.5D Take 1           [facility]

[facility]



     e (ALINIA)                                tablet           st



     500 MG      00:00: 04:59                          (500 mg           [facility]



     tablet      00   :00                           total) by           l



                                                  mouth 2           



                                                  (two)           



                                                  times a           



                                                  day for 7           



                                                  days.           

 

     methocarbam      -0 - No             250mg Q.[id number] Take 0.5     

      [facility]



     oL                             [id number] tablets           st



     (ROBAXIN)      00:00: 04:59                     3D   (250 mg           H[facility]

[facility]



     500 MG      00   :00                           total) by           l



     tablet                                         mouth 3           



                                                  (three)           



                                                  times a           



                                                  day for 7           



                                                  days.           

 

     nitazoxanid      -2022- No             500mg Q.5D Take 1           [facility]

[facility]



     e (ALINIA)      6-25 07-03                          tablet           st



     500 MG      00:00: 04:59                          (500 mg           [facility]



     tablet      00   :00                           total) by           l



                                                  mouth 2           



                                                  (two)           



                                                  times a           



                                                  day for 7           



                                                  days.           

 

     oxyCODONE      2022-0 2022- No        [id number] 5mg  Q4H  Take 1           [facility]

[facility]



     (ROXICODONE      6-25 07-01                          tablet (5           st



     ) 5 MG      00:00: 04:59                          mg total)           [facility]

ta



     immediate      00   :00                           by mouth           l



     release                                         every 4           



     tablet                                         (four)           



                                                  hours as           



                                                  needed for           



                                                  severe           



                                                  pain for           



                                                  up to 20           



                                                  doses           



                                                  .acute           



                                                  pain. Max           



                                                  Daily           



                                                  Amount: 30           



                                                  mg             

 

     oxyCODONE      2022-0 2022- No        [id number] 5mg  Q4H  Take 1           [facility]

[facility]



     (ROXICODONE      6-25 07-01                          tablet (5           st



     ) 5 MG      00:00: 04:59                          mg total)           [facility]

ta



     immediate      00   :00                           by mouth           l



     release                                         every 4           



     tablet                                         (four)           



                                                  hours as           



                                                  needed for           



                                                  severe           



                                                  pain for           



                                                  up to 20           



                                                  doses           



                                                  .acute           



                                                  pain. Max           



                                                  Daily           



                                                  Amount: 30           



                                                  mg             

 

     oxyCODONE      2022-0 2022- No        [id number] 5mg  Q4H  Take 1           [facility]

[facility]



     (ROXICODONE      6-25 07-01                          tablet (5           st



     ) 5 MG      00:00: 04:59                          mg total)           [facility]

ta



     immediate      00   :00                           by mouth           l



     release                                         every 4           



     tablet                                         (four)           



                                                  hours as           



                                                  needed for           



                                                  severe           



                                                  pain for           



                                                  up to 20           



                                                  doses           



                                                  .acute           



                                                  pain. Max           



                                                  Daily           



                                                  Amount: 30           



                                                  mg             

 

     tizanidine      2022-0      Yes                      4 mg = 1           [facility]

[facility]



     4 mg oral      5-16                               tab, PO,           l



     tablet      18:09:                               Bedtime,           [place]



               00                                 PRN AS           



                                                  NEEDED FOR           



                                                  MUSCLE           



                                                  SPASM, #           



                                                  15 tab, 0           



                                                  Refill(s),           



                                                  Pharmacy:           



                                                  [facility]           



                                                  #[id number],           



                                                  165.1, cm,           



                                                  21           



                                                  15:23:00           



                                                  CST,           



                                                  Height,           



                                                  107.301,           



                                                  kg,            



                                                  21           



                                                  15:23:00           



                                                  CST,           



                                                  Weight           

 

     tizanidine      2022-0      Yes                      4 mg = 1           [facility]

[facility]



     4 mg oral      5-16                               tab, PO,           l



     tablet      18:09:                               Bedtime,           [facility] [place]



               00                                 PRN AS           



                                                  NEEDED FOR           



                                                  MUSCLE           



                                                  SPASM, #           



                                                  15 tab, 0           



                                                  Refill(s),           



                                                  Pharmacy:           



                                                  [facility]           



                                                  #[id number],           



                                                  165.1, cm,           



                                                  21           



                                                  15:23:00           



                                                  CST,           



                                                  Height,           



                                                  107.301,           



                                                  kg,            



                                                  21           



                                                  15:23:00           



                                                  CST,           



                                                  Weight           

 

     tizanidine      2022-0      Yes                      4 mg = 1           [facility]

[facility]



     4 mg oral      5-16                               tab, PO,           l



     tablet      18:09:                               Bedtime,           [facility] [place]



               00                                 PRN AS           



                                                  NEEDED FOR           



                                                  MUSCLE           



                                                  SPASM, #           



                                                  15 tab, 0           



                                                  Refill(s),           



                                                  Pharmacy:           



                                                  [facility]           



                                                  #[id number],           



                                                  165.1, cm,           



                                                  21           



                                                  15:23:00           



                                                  CST,           



                                                  Height,           



                                                  107.301,           



                                                  kg,            



                                                  21           



                                                  15:23:00           



                                                  CST,           



                                                  Weight           

 

     tizanidine      2022-0      Yes                      4 mg = 1           [facility]

[facility]



     4 mg oral      5-16                               tab, PO,           l



     tablet      18:09:                               Bedtime,           [place]



               00                                 PRN AS           



                                                  NEEDED FOR           



                                                  MUSCLE           



                                                  SPASM, #           



                                                  15 tab, 0           



                                                  Refill(s),           



                                                  Pharmacy:           



                                                  [facility]           



                                                  #[id number],           



                                                  165.1, cm,           



                                                  21           



                                                  15:23:00           



                                                  CST,           



                                                  Height,           



                                                  107.301,           



                                                  kg,            



                                                  21           



                                                  15:23:00           



                                                  CST,           



                                                  Weight           

 

     tizanidine      2022-0      Yes                      4 mg = 1           [facility]

[facility]



     4 mg oral      5-16                               tab, PO,           l



     tablet      18:09:                               Bedtime,           [place]



               00                                 PRN AS           



                                                  NEEDED FOR           



                                                  MUSCLE           



                                                  SPASM, #           



                                                  15 tab, 0           



                                                  Refill(s),           



                                                  Pharmacy:           



                                                  [facility]           



                                                  #[id number],           



                                                  165.1, cm,           



                                                  21           



                                                  15:23:00           



                                                  CST,           



                                                  Height,           



                                                  107.301,           



                                                  kg,            



                                                  21           



                                                  15:23:00           



                                                  CST,           



                                                  Weight           

 

     tizanidine      2022-0      Yes                      4 mg = 1           [facility]

[facility]



     4 mg oral      5-16                               tab, PO,           l



     tablet      18:09:                               Bedtime,           [facility] [place]



               00                                 PRN AS           



                                                  NEEDED FOR           



                                                  MUSCLE           



                                                  SPASM, #           



                                                  15 tab, 0           



                                                  Refill(s),           



                                                  Pharmacy:           



                                                  [facility]           



                                                  #[id number],           



                                                  165.1, cm,           



                                                  21           



                                                  15:23:00           



                                                  CST,           



                                                  Height,           



                                                  107.301,           



                                                  kg,            



                                                  21           



                                                  15:23:00           



                                                  CST,           



                                                  Weight           

 

     tizanidine      2022-0      Yes                      4 mg = 1           [facility]

[facility]



     4 mg oral      5-16                               tab, PO,           l



     tablet      18:09:                               Bedtime,           [facility]



               00                                 PRN AS           



                                                  NEEDED FOR           



                                                  MUSCLE           



                                                  SPASM, #           



                                                  15 tab, 0           



                                                  Refill(s),           



                                                  Pharmacy:           



                                                  [facility]           



                                                  [facility]           



                                                  #[id number],           



                                                  165.1, cm,           



                                                  21           



                                                  15:23:00           



                                                  CST,           



                                                  Height,           



                                                  107.301,           



                                                  kg,            



                                                  21           



                                                  15:23:00           



                                                  CST,           



                                                  Weight           

 

     tizanidine      2-0      Yes                      4 mg = 1           [facility]

[facility]



     4 mg oral      5-16                               tab, PO,           l



     tablet      18:09:                               Bedtime,           [facility] [place]



               00                                 PRN AS           



                                                  NEEDED FOR           



                                                  MUSCLE           



                                                  SPASM, #           



                                                  15 tab, 0           



                                                  Refill(s),           



                                                  Pharmacy:           



                                                  [facility]           



                                                  #[id number],           



                                                  165.1, cm,           



                                                  21           



                                                  15:23:00           



                                                  CST,           



                                                  Height,           



                                                  107.301,           



                                                  kg,            



                                                  21           



                                                  15:23:00           



                                                  CST,           



                                                  Weight           

 

     tizanidine      -0      Yes                      4 mg = 1           [facility]

[facility]



     4 mg oral      5-16                               tab, PO,           l



     tablet      18:09:                               Bedtime,           [place]



               00                                 PRN AS           



                                                  NEEDED FOR           



                                                  MUSCLE           



                                                  SPASM, #           



                                                  15 tab, 0           



                                                  Refill(s),           



                                                  Pharmacy:           



                                                  [facility]           



                                                  #[id number],           



                                                  165.1, cm,           



                                                  21           



                                                  15:23:00           



                                                  CST,           



                                                  Height,           



                                                  107.301,           



                                                  kg,            



                                                  21           



                                                  15:23:00           



                                                  CST,           



                                                  Weight           

 

     nitrofurant      2022- No             100mg Q.5D Take 1           [facility]

[facility]



     oin,      3-11 03-15                          capsule           st



     macrocrysta      00:00: 04:59                          (100 mg           [facility]

[facility]



     l-monohydra      00   :00                           total) by           l



     te,                                          mouth 2           



     (MACROBID)                                         (two)           



     100 MG                                         times a           



     capsule                                         day for 3           



                                                  days.           

 

     nitrofurant      2022- No             100mg Q.5D Take 1           [facility]

[facility]



     oin,      3-11 03-15                          capsule           st



     macrocrysta      00:00: 04:59                          (100 mg           [facility]

[facility]



     l-monohydra      00   :00                           total) by           l



     te,                                          mouth 2           



     (MACROBID)                                         (two)           



     100 MG                                         times a           



     capsule                                         day for 3           



                                                  days.           

 

     nitrofurant      2022- No             100mg Q.5D Take 1           [facility]

[facility]



     oin,      3-11 03-15                          capsule           st



     macrocrysta      00:00: 04:59                          (100 mg           [facility]

[facility]



     l-monohydra      00   :00                           total) by           l



     te,                                          mouth 2           



     (MACROBID)                                         (two)           



     100 MG                                         times a           



     capsule                                         day for 3           



                                                  days.           

 

     lidocaine      2022- No             1{patch Q24H Place 1           [facility]

[facility]



     (LIDODERM)      3-09 04-09                }         patch on           st



     5 %       00:00: 04:59                          the skin           [facility]



               00   :00                           in the           l



                                                  morning           



                                                  for 30           



                                                  days.           



                                                  Remove &           



                                                  Discard           



                                                  patch           



                                                  within 12           



                                                  hours or           



                                                  as             



                                                  directed           



                                                  by MD.           

 

     lidocaine      2022- No             1{patch Q24H Place 1           [facility]

[facility]



     (LIDODERM)      3-09 04-09                }         patch on           st



     5 %       00:00: 04:59                          the skin           [facility]



               00   :00                           in the           l



                                                  morning           



                                                  for 30           



                                                  days.           



                                                  Remove &           



                                                  Discard           



                                                  patch           



                                                  within 12           



                                                  hours or           



                                                  as             



                                                  directed           



                                                  by MD.           

 

     lidocaine      2022- No             1{patch Q24H Place 1           [facility]

[facility]



     (LIDODERM)      3-09 04-09                }         patch on           st



     5 %       00:00: 04:59                          the skin           [facility]



               00   :00                           in the           l



                                                  morning           



                                                  for 30           



                                                  days.           



                                                  Remove &           



                                                  Discard           



                                                  patch           



                                                  within 12           



                                                  hours or           



                                                  as             



                                                  directed           



                                                  by MD.           

 

     traMADoL      28 50mg [id number] Take 1          

 [facility]



     (ULTRAM) 50      3-09 03-                     [id number] tablet (50      

     st



     mg tablet      00:00: 04:59                     3D   mg total)           [facility]

[facility]



               00   :00                           by mouth           l



                                                  as needed           



                                                  in the           



                                                  morning           



                                                  and 1           



                                                  tablet (50           



                                                  mg total)           



                                                  as needed           



                                                  at noon           



                                                  and 1           



                                                  tablet (50           



                                                  mg total)           



                                                  as needed           



                                                  in the           



                                                  evening           



                                                  for            



                                                  moderate           



                                                  pain. Do           



                                                  all this           



                                                  for up to           



                                                  10             



                                                  days.acute           



                                                  pain.           

 

     traMADoL       No        99154 50mg [id number] Take 1          

 [facility]



     (ULTRAM) 50      3-09 03-20                     [id number] tablet (50      

     st



     mg tablet      00:00: 04:59                     3D   mg total)           [facility]

[facility]



               00   :00                           by mouth           l



                                                  as needed           



                                                  in the           



                                                  morning           



                                                  and 1           



                                                  tablet (50           



                                                  mg total)           



                                                  as needed           



                                                  at noon           



                                                  and 1           



                                                  tablet (50           



                                                  mg total)           



                                                  as needed           



                                                  in the           



                                                  evening           



                                                  for            



                                                  moderate           



                                                  pain. Do           



                                                  all this           



                                                  for up to           



                                                  10             



                                                  days.acute           



                                                  pain.           

 

     traMADoL       No        [id number] 50mg [id number] Take 1          

 [facility]



     (ULTRAM) 50      3-09 03-20                     [id number] tablet (50      

     st



     mg tablet      00:00: 04:59                     3D   mg total)           [facility]

[facility]



               00   :00                           by mouth           l



                                                  as needed           



                                                  in the           



                                                  morning           



                                                  and 1           



                                                  tablet (50           



                                                  mg total)           



                                                  as needed           



                                                  at noon           



                                                  and 1           



                                                  tablet (50           



                                                  mg total)           



                                                  as needed           



                                                  in the           



                                                  evening           



                                                  for            



                                                  moderate           



                                                  pain. Do           



                                                  all this           



                                                  for up to           



                                                  10             



                                                  days.acute           



                                                  pain.           

 

     metoprolol      2022-0      Yes            25mg Q.5D Take 25 mg           M

ethodi



     tartrate      2-21                               by mouth 2           st



     (LOPRESSOR)      00:00:                               (two)           [facility]

ta



     25 mg      00                                 times a           l



     tablet                                         day.           

 

     metoprolol      2022-0      Yes            25mg Q.5D Take 25 mg           M

ethodi



     tartrate      2-21                               by mouth 2           st



     (LOPRESSOR)      00:00:                               (two)           [facility]

ta



     25 mg      00                                 times a           l



     tablet                                         day.           

 

     metoprolol      2022-0      Yes            25mg Q.5D Take 25 mg           M

ethodi



     tartrate      2-21                               by mouth 2           st



     (LOPRESSOR)      00:00:                               (two)           [facility]

ta



     25 mg      00                                 times a           l



     tablet                                         day.           

 

     Ondansetron      2022-0      Yes                      4 mg = 1           Me

[facility]



     4 MG Oral      1-07                               tab, PO,           l



     Tablet      22:01:                               BID, X 5           [facility]



     [Zofran]                                       day, # 10           



                                                  tab, 0           



                                                  Refill(s),           



                                                  Pharmacy:           



                                                  [facility]           



                                                  [facility]           



                                                  #[id number],           



                                                  165.1, cm,           



                                                  21           



                                                  15:23:00           



                                                  CST,           



                                                  Height,           



                                                  107.301,           



                                                  kg,            



                                                  21           



                                                  15:23:00           



                                                  CST,           



                                                  Weight           

 

     Ondansetron      2022-0      Yes                      4 mg = 1           Me

[facility]



     4 MG Oral      1-07                               tab, PO,           l



     Tablet      22:01:                               BID, X 5           [place]



     [Zofran]                                       day, # 10           



                                                  tab, 0           



                                                  Refill(s),           



                                                  Pharmacy:           



                                                  [facility]           



                                                  [facility]           



                                                  #[id number],           



                                                  165.1, cm,           



                                                  21           



                                                  15:23:00           



                                                  CST,           



                                                  Height,           



                                                  107.301,           



                                                  kg,            



                                                  21           



                                                  15:23:00           



                                                  CST,           



                                                  Weight           

 

     Ondansetron      2022-0      Yes                      4 mg = 1           Me

[facility]



     4 MG Oral      1-07                               tab, PO,           l



     Tablet      22:01:                               BID, X 5           [facility]



     [Zofran]                                       day, # 10           



                                                  tab, 0           



                                                  Refill(s),           



                                                  Pharmacy:           



                                                  [facility]           



                                                  [facility]           



                                                  #[id number],           



                                                  165.1, cm,           



                                                  21           



                                                  15:23:00           



                                                  CST,           



                                                  Height,           



                                                  107.301,           



                                                  kg,            



                                                  21           



                                                  15:23:00           



                                                  CST,           



                                                  Weight           

 

     Ondansetron      2022-0      Yes                      4 mg = 1           Me

[facility]



     4 MG Oral      1-07                               tab, PO,           l



     Tablet      22:01:                               BID, X 5           [facility]



     [Zofran]                                       day, # 10           



                                                  tab, 0           



                                                  Refill(s),           



                                                  Pharmacy:           



                                                  [facility]           



                                                  [facility]           



                                                  #[id number],           



                                                  165.1, cm,           



                                                  21           



                                                  15:23:00           



                                                  CST,           



                                                  Height,           



                                                  107.301,           



                                                  kg,            



                                                  21           



                                                  15:23:00           



                                                  CST,           



                                                  Weight           

 

     Ondansetron      2022-0      Yes                      4 mg = 1           Me

[facility]



     4 MG Oral      1-07                               tab, PO,           l



     Tablet      22:01:                               BID, X 5           [facility]



     [Zofran]                                       day, # 10           



                                                  tab, 0           



                                                  Refill(s),           



                                                  Pharmacy:           



                                                  [facility]           



                                                  [facility]           



                                                  #[id number],           



                                                  165.1, cm,           



                                                  21           



                                                  15:23:00           



                                                  CST,           



                                                  Height,           



                                                  107.301,           



                                                  kg,            



                                                  21           



                                                  15:23:00           



                                                  CST,           



                                                  Weight           

 

     Ondansetron      2022-0      Yes                      4 mg = 1           Me

[facility]



     4 MG Oral      1-07                               tab, PO,           l



     Tablet      22:01:                               BID, X 5           [place]



     [Zofran]                                       day, # 10           



                                                  tab, 0           



                                                  Refill(s),           



                                                  Pharmacy:           



                                                  [facility]           



                                                  [facility]           



                                                  #[id number],           



                                                  165.1, cm,           



                                                  21           



                                                  15:23:00           



                                                  CST,           



                                                  Height,           



                                                  107.301,           



                                                  kg,            



                                                  21           



                                                  15:23:00           



                                                  CST,           



                                                  Weight           

 

     Ondansetron      2022-0      Yes                      4 mg = 1           Me

[facility]



     4 MG Oral      1-07                               tab, PO,           l



     Tablet      22:01:                               BID, X 5           [facility]



     [Zofran]                                       day, # 10           



                                                  tab, 0           



                                                  Refill(s),           



                                                  Pharmacy:           



                                                  [facility]           



                                                  [facility]           



                                                  #[id number],           



                                                  165.1, cm,           



                                                  21           



                                                  15:23:00           



                                                  CST,           



                                                  Height,           



                                                  107.301,           



                                                  kg,            



                                                  21           



                                                  15:23:00           



                                                  CST,           



                                                  Weight           

 

     Ondansetron      2022-0      Yes                      4 mg = 1           Me

[facility]



     4 MG Oral      1-07                               tab, PO,           l



     Tablet      22:01:                               BID, X 5           [facility]



     [Zofran]                                       day, # 10           



                                                  tab, 0           



                                                  Refill(s),           



                                                  Pharmacy:           



                                                  [facility]           



                                                  [facility]           



                                                  #[id number],           



                                                  165.1, cm,           



                                                  21           



                                                  15:23:00           



                                                  CST,           



                                                  Height,           



                                                  107.301,           



                                                  kg,            



                                                  21           



                                                  15:23:00           



                                                  CST,           



                                                  Weight           

 

     Ondansetron            Yes                      4 mg = 1           Me

[facility]



     4 MG Oral      1-07                               tab, PO,           l



     Tablet      22:01:                               BID, X 5           [facility]



     [Zofran]                                       day, # 10           



                                                  tab, 0           



                                                  Refill(s),           



                                                  Pharmacy:           



                                                  [facility]           



                                                  [facility]           



                                                  #[id number],           



                                                  165.1, cm,           



                                                  21           



                                                  15:23:00           



                                                  CST,           



                                                  Height,           



                                                  107.301,           



                                                  kg,            



                                                  21           



                                                  15:23:00           



                                                  CST,           



                                                  Weight           

 

     atorvastati      2021      Yes                      10 mg = 1           M

[facility]



     n 10 mg      2-27                               tab, PO,           l



     oral tablet      21:45:                               Bedtime, #           

[facility]



               00                                 90 tab, 0           



                                                  Refill(s),           



                                                  Pharmacy:           



                                                  [facility]           



                                                  #[id number],           



                                                  165.1, cm,           



                                                  21           



                                                  15:23:00           



                                                  CST,           



                                                  Height,           



                                                  107.301,           



                                                  kg,            



                                                  21           



                                                  15:23:00           



                                                  CST,           



                                                  Weight           

 

     atorvastati      2021      Yes                      10 mg = 1           M

[facility]



     n 10 mg      2-27                               tab, PO,           l



     oral tablet      21:45:                               Bedtime, #           

[facility]



               00                                 90 tab, 0           



                                                  Refill(s),           



                                                  Pharmacy:           



                                                  [facility]           



                                                  #[id number],           



                                                  165.1, cm,           



                                                  21           



                                                  15:23:00           



                                                  CST,           



                                                  Height,           



                                                  107.301,           



                                                  kg,            



                                                  21           



                                                  15:23:00           



                                                  CST,           



                                                  Weight           

 

     atorvastati      2021      Yes                      10 mg = 1           M

[facility]



     n 10 mg      2-27                               tab, PO,           l



     oral tablet      21:45:                               Bedtime, #           

[place]



               00                                 90 tab, 0           



                                                  Refill(s),           



                                                  Pharmacy:           



                                                  [facility]           



                                                  #[id number],           



                                                  165.1, cm,           



                                                  21           



                                                  15:23:00           



                                                  CST,           



                                                  Height,           



                                                  107.301,           



                                                  kg,            



                                                  21           



                                                  15:23:00           



                                                  CST,           



                                                  Weight           

 

     atorvastati      2021      Yes                      10 mg = 1           M

[facility]



     n 10 mg      2-27                               tab, PO,           l



     oral tablet      21:45:                               Bedtime, #           

[facility]



               00                                 90 tab, 0           



                                                  Refill(s),           



                                                  Pharmacy:           



                                                  [facility]           



                                                  [facility]           



                                                  #[id number],           



                                                  165.1, cm,           



                                                  21           



                                                  15:23:00           



                                                  CST,           



                                                  Height,           



                                                  107.301,           



                                                  kg,            



                                                  21           



                                                  15:23:00           



                                                  CST,           



                                                  Weight           

 

     atorvas2021      Yes                      10 mg = 1           M

[facility]



     n 10 mg      2-27                               tab, PO,           l



     oral tablet      21:45:                               Bedtime, #           

[facility]



               00                                 90 tab, 0           



                                                  Refill(s),           



                                                  Pharmacy:           



                                                  [facility]           



                                                  [facility]           



                                                  #[id number],           



                                                  165.1, cm,           



                                                  21           



                                                  15:23:00           



                                                  CST,           



                                                  Height,           



                                                  107.301,           



                                                  kg,            



                                                  21           



                                                  15:23:00           



                                                  CST,           



                                                  Weight           

 

     atorvasta2021      Yes                      10 mg = 1           M

[facility]



     n 10 mg      2-27                               tab, PO,           l



     oral tablet      21:45:                               Bedtime, #           

[place]



               00                                 90 tab, 0           



                                                  Refill(s),           



                                                  Pharmacy:           



                                                  [facility]           



                                                  [facility]           



                                                  #[id number],           



                                                  165.1, cm,           



                                                  21           



                                                  15:23:00           



                                                  CST,           



                                                  Height,           



                                                  107.301,           



                                                  kg,            



                                                  21           



                                                  15:23:00           



                                                  CST,           



                                                  Weight           

 

     atorvasta2021      Yes                      10 mg = 1           M

[facility]



     n 10 mg      2-27                               tab, PO,           l



     oral tablet      21:45:                               Bedtime, #           

[place]



               00                                 90 tab, 0           



                                                  Refill(s),           



                                                  Pharmacy:           



                                                  [facility]           



                                                  [facility]           



                                                  #[id number],           



                                                  165.1, cm,           



                                                  21           



                                                  15:23:00           



                                                  CST,           



                                                  Height,           



                                                  107.301,           



                                                  kg,            



                                                  21           



                                                  15:23:00           



                                                  CST,           



                                                  Weight           

 

     atorvastati      2021      Yes                      10 mg = 1           M

[facility]



     n 10 mg      2-27                               tab, PO,           l



     oral tablet      21:45:                               Bedtime, #           

[facility] [place]



               00                                 90 tab, 0           



                                                  Refill(s),           



                                                  Pharmacy:           



                                                  [facility]           



                                                  #[id number],           



                                                  165.1, cm,           



                                                  21           



                                                  15:23:00           



                                                  CST,           



                                                  Height,           



                                                  107.301,           



                                                  kg,            



                                                  21           



                                                  15:23:00           



                                                  CST,           



                                                  Weight           

 

     atorvastati      2021      Yes                      10 mg = 1           M

[facility]



     n 10 mg      2-27                               tab, PO,           l



     oral tablet      21:45:                               Bedtime, #           

[facility] [place]



               00                                 90 tab, 0           



                                                  Refill(s),           



                                                  Pharmacy:           



                                                  [facility]           



                                                  #[id number],           



                                                  165.1, cm,           



                                                  21           



                                                  15:23:00           



                                                  CST,           



                                                  Height,           



                                                  107.301,           



                                                  kg,            



                                                  21           



                                                  15:23:00           



                                                  CST,           



                                                  Weight           

 

     tizanidine      2021      Yes                      4 mg = 1           [facility]

[facility]



     4 mg oral      2-27                               tab, PO,           l



     tablet      21:40:                               Bedtime,           [place]



               00                                 PRN for           



                                                  muscle           



                                                  spasm, #           



                                                  30 tab, 0           



                                                  Refill(s),           



                                                  Pharmacy:           



                                                  [facility]           



                                                  #[id number],           



                                                  165.1, cm,           



                                                  21           



                                                  15:23:00           



                                                  CST,           



                                                  Height,           



                                                  107.301,           



                                                  kg,            



                                                  21           



                                                  15:23:00           



                                                  CST,           



                                                  Weight           

 

     Acetaminoph      2021      Yes                      1 tab, PO,           

[facility]



     en 325 MG /      2-27                               Q12H, PRN           l



     tramadol      21:40:                               for pain,           [facility]

[facility]



     hydrochlori      00                                 X 15 day,           



     de 37.5 MG                                         # 30 tab,           



     Oral Tablet                                         0              



     [Ultracet]                                         Refill(s),           



                                                  Pharmacy:           



                                                  [facility]           



                                                  #[id number],           



                                                  165.1, cm,           



                                                  21           



                                                  15:23:00           



                                                  CST,           



                                                  Height,           



                                                  107.301,           



                                                  kg,            



                                                  21           



                                                  15:23:00           



                                                  CST,           



                                                  Weight           

 

     tizanidine      2021      Yes                      4 mg = 1           [facility]

[facility]



     4 mg oral      2-27                               tab, PO,           l



     tablet      21:40:                               Bedtime,           [place]



               00                                 PRN for           



                                                  muscle           



                                                  spasm, #           



                                                  30 tab, 0           



                                                  Refill(s),           



                                                  Pharmacy:           



                                                  [facility]           



                                                  #[id number],           



                                                  165.1, cm,           



                                                  21           



                                                  15:23:00           



                                                  CST,           



                                                  Height,           



                                                  107.301,           



                                                  kg,            



                                                  21           



                                                  15:23:00           



                                                  CST,           



                                                  Weight           

 

     Acetaminoph      2021      Yes                      1 tab, PO,           

[facility]



     en 325 MG /      2-27                               Q12H, PRN           l



     tramadol      21:40:                               for pain,           [facility]

[facility]



     hydrochlori      00                                 X 15 day,           



     de 37.5 MG                                         # 30 tab,           



     Oral Tablet                                         0              



     [Ultracet]                                         Refill(s),           



                                                  Pharmacy:           



                                                  [facility]           



                                                  #[id number],           



                                                  165.1, cm,           



                                                  21           



                                                  15:23:00           



                                                  CST,           



                                                  Height,           



                                                  107.301,           



                                                  kg,            



                                                  21           



                                                  15:23:00           



                                                  CST,           



                                                  Weight           

 

     tizanidine      2021      Yes                      4 mg = 1           [facility]

[facility]



     4 mg oral      2-27                               tab, PO,           l



     tablet      21:40:                               Bedtime,           [place]



               00                                 PRN for           



                                                  muscle           



                                                  spasm, #           



                                                  30 tab, 0           



                                                  Refill(s),           



                                                  Pharmacy:           



                                                  [facility]           



                                                  #[id number],           



                                                  165.1, cm,           



                                                  21           



                                                  15:23:00           



                                                  CST,           



                                                  Height,           



                                                  107.301,           



                                                  kg,            



                                                  21           



                                                  15:23:00           



                                                  CST,           



                                                  Weight           

 

     Acetaminoph      2021      Yes                      1 tab, PO,           

[facility]



     en 325 MG /      2-27                               Q12H, PRN           l



     tramadol      21:40:                               for pain,           [facility]

[facility]



     hydrochlori      00                                 X 15 day,           



     de 37.5 MG                                         # 30 tab,           



     Oral Tablet                                         0              



     [Ultracet]                                         Refill(s),           



                                                  Pharmacy:           



                                                  [facility]           



                                                  #[id number],           



                                                  165.1, cm,           



                                                  21           



                                                  15:23:00           



                                                  CST,           



                                                  Height,           



                                                  107.301,           



                                                  kg,            



                                                  21           



                                                  15:23:00           



                                                  CST,           



                                                  Weight           

 

     tizanidine      2021      Yes                      4 mg = 1           [facility]

[facility]



     4 mg oral      2-27                               tab, PO,           l



     tablet      21:40:                               Bedtime,           [facility] [place]



               00                                 PRN for           



                                                  muscle           



                                                  spasm, #           



                                                  30 tab, 0           



                                                  Refill(s),           



                                                  Pharmacy:           



                                                  [facility]           



                                                  #[id number],           



                                                  165.1, cm,           



                                                  21           



                                                  15:23:00           



                                                  CST,           



                                                  Height,           



                                                  107.301,           



                                                  kg,            



                                                  21           



                                                  15:23:00           



                                                  CST,           



                                                  Weight           

 

     Acetaminoph      2021      Yes                      1 tab, PO,           

[facility]



     en 325 MG /      2-27                               Q12H, PRN           l



     tramadol      21:40:                               for pain,           [facility]

[facility]



     hydrochlori      00                                 X 15 day,           



     de 37.5 MG                                         # 30 tab,           



     Oral Tablet                                         0              



     [Ultracet]                                         Refill(s),           



                                                  Pharmacy:           



                                                  [facility]           



                                                  #[id number],           



                                                  165.1, cm,           



                                                  21           



                                                  15:23:00           



                                                  CST,           



                                                  Height,           



                                                  107.301,           



                                                  kg,            



                                                  21           



                                                  15:23:00           



                                                  CST,           



                                                  Weight           

 

     tizanidine      2021      Yes                      4 mg = 1           [facility]

[facility]



     4 mg oral      2-27                               tab, PO,           l



     tablet      21:40:                               Bedtime,           [facility] [place]



               00                                 PRN for           



                                                  muscle           



                                                  spasm, #           



                                                  30 tab, 0           



                                                  Refill(s),           



                                                  Pharmacy:           



                                                  [facility]           



                                                  #[id number],           



                                                  165.1, cm,           



                                                  21           



                                                  15:23:00           



                                                  CST,           



                                                  Height,           



                                                  107.301,           



                                                  kg,            



                                                  21           



                                                  15:23:00           



                                                  CST,           



                                                  Weight           

 

     Acetaminoph      2021      Yes                      1 tab, PO,           

[facility]



     en 325 MG /      2-27                               Q12H, PRN           l



     tramadol      21:40:                               for pain,           [facility]

[facility]



     hydrochlori      00                                 X 15 day,           



     de 37.5 MG                                         # 30 tab,           



     Oral Tablet                                         0              



     [Ultracet]                                         Refill(s),           



                                                  Pharmacy:           



                                                  [facility]           



                                                  #[id number],           



                                                  165.1, cm,           



                                                  21           



                                                  15:23:00           



                                                  CST,           



                                                  Height,           



                                                  107.301,           



                                                  kg,            



                                                  21           



                                                  15:23:00           



                                                  CST,           



                                                  Weight           

 

     tizanidine      2021      Yes                      4 mg = 1           [facility]

[facility]



     4 mg oral      2-27                               tab, PO,           l



     tablet      21:40:                               Bedtime,           [place]



               00                                 PRN for           



                                                  muscle           



                                                  spasm, #           



                                                  30 tab, 0           



                                                  Refill(s),           



                                                  Pharmacy:           



                                                  [facility]           



                                                  #[id number],           



                                                  165.1, cm,           



                                                  21           



                                                  15:23:00           



                                                  CST,           



                                                  Height,           



                                                  107.301,           



                                                  kg,            



                                                  21           



                                                  15:23:00           



                                                  CST,           



                                                  Weight           

 

     Acetaminoph      2021      Yes                      1 tab, PO,           

[facility]



     en 325 MG /      2-27                               Q12H, PRN           l



     tramadol      21:40:                               for pain,           [facility]

daryl



     hydrochlori      00                                 X 15 day,           



     de 37.5 MG                                         # 30 tab,           



     Oral Tablet                                         0              



     [Ultracet]                                         Refill(s),           



                                                  Pharmacy:           



                                                  [facility]           



                                                  #[id number],           



                                                  165.1, cm,           



                                                  21           



                                                  15:23:00           



                                                  CST,           



                                                  Height,           



                                                  107.301,           



                                                  kg,            



                                                  21           



                                                  15:23:00           



                                                  CST,           



                                                  Weight           

 

     tizanidine      2021      Yes                      4 mg = 1           [facility]

[facility]



     4 mg oral      2-27                               tab, PO,           l



     tablet      21:40:                               Bedtime,           [facility]



               00                                 PRN for           



                                                  muscle           



                                                  spasm, #           



                                                  30 tab, 0           



                                                  Refill(s),           



                                                  Pharmacy:           



                                                  [facility]           



                                                  #[id number],           



                                                  165.1, cm,           



                                                  21           



                                                  15:23:00           



                                                  CST,           



                                                  Height,           



                                                  107.301,           



                                                  kg,            



                                                  21           



                                                  15:23:00           



                                                  CST,           



                                                  Weight           

 

     Acetaminoph      2021      Yes                      1 tab, PO,           

[facility]



     en 325 MG /      2-27                               Q12H, PRN           l



     tramadol      21:40:                               for pain,           [facility]

daryl



     hydrochlori      00                                 X 15 day,           



     de 37.5 MG                                         # 30 tab,           



     Oral Tablet                                         0              



     [Ultracet]                                         Refill(s),           



                                                  Pharmacy:           



                                                  [facility]           



                                                  #[id number],           



                                                  165.1, cm,           



                                                  21           



                                                  15:23:00           



                                                  CST,           



                                                  Height,           



                                                  107.301,           



                                                  kg,            



                                                  21           



                                                  15:23:00           



                                                  CST,           



                                                  Weight           

 

     tizanidine      2021      Yes                      4 mg = 1           [facility]

[facility]



     4 mg oral      2-27                               tab, PO,           l



     tablet      21:40:                               Bedtime,           [place]



               00                                 PRN for           



                                                  muscle           



                                                  spasm, #           



                                                  30 tab, 0           



                                                  Refill(s),           



                                                  Pharmacy:           



                                                  [facility]           



                                                  #[id number],           



                                                  165.1, cm,           



                                                  21           



                                                  15:23:00           



                                                  CST,           



                                                  Height,           



                                                  107.301,           



                                                  kg,            



                                                  21           



                                                  15:23:00           



                                                  CST,           



                                                  Weight           

 

     Acetaminoph      2021      Yes                      1 tab, PO,           

[facility]



     en 325 MG /      2-27                               Q12H, PRN           l



     tramadol      21:40:                               for pain,           [facility]

daryl



     hydrochlori      00                                 X 15 day,           



     de 37.5 MG                                         # 30 tab,           



     Oral Tablet                                         0              



     [Ultracet]                                         Refill(s),           



                                                  Pharmacy:           



                                                  [facility]           



                                                  #[id number],           



                                                  165.1, cm,           



                                                  21           



                                                  15:23:00           



                                                  CST,           



                                                  Height,           



                                                  107.301,           



                                                  kg,            



                                                  21           



                                                  15:23:00           



                                                  CST,           



                                                  Weight           

 

     tizanidine      2021      Yes                      4 mg = 1           [facility]

[facility]



     4 mg oral      2-27                               tab, PO,           l



     tablet      21:40:                               Bedtime,           [place]



               00                                 PRN for           



                                                  muscle           



                                                  spasm, #           



                                                  30 tab, 0           



                                                  Refill(s),           



                                                  Pharmacy:           



                                                  [facility]           



                                                  #[id number],           



                                                  165.1, cm,           



                                                  21           



                                                  15:23:00           



                                                  CST,           



                                                  Height,           



                                                  107.301,           



                                                  kg,            



                                                  21           



                                                  15:23:00           



                                                  CST,           



                                                  Weight           

 

     Acetaminoph      2021      Yes                      1 tab, PO,           

[facility]



     en 325 MG /      2-27                               Q12H, PRN           l



     tramadol      21:40:                               for pain,           [facility] magalloni      00                                 X 15 day,           



     de 37.5 MG                                         # 30 tab,           



     Oral Tablet                                         0              



     [Ultracet]                                         Refill(s),           



                                                  Pharmacy:           



                                                  [facility]           



                                                  [facility]           



                                                  #[id number],           



                                                  165.1, cm,           



                                                  21           



                                                  15:23:00           



                                                  CST,           



                                                  Height,           



                                                  107.301,           



                                                  kg,            



                                                  21           



                                                  15:23:00           



                                                  CST,           



                                                  Weight           

 

     sevelamer      2021      Yes                      1,600 mg =           Me

[facility]



     800 mg oral      2-27                               2 tab, PO,           l



     tablet      21:21:                               TID-Meals,           [facility]

nn



               00                                 # 180 tab,           



                                                  0              



                                                  Refill(s)           

 

     sevelamer      2021      Yes                      1,600 mg =           Me

[facility]



     800 mg oral      2-27                               2 tab, PO,           l



     tablet      21:21:                               TID-Meals,           [facility]

nn



               00                                 # 180 tab,           



                                                  0              



                                                  Refill(s)           

 

     sevelamer      2021      Yes                      1,600 mg =           Me

[facility]



     800 mg oral      2-27                               2 tab, PO,           l



     tablet      21:21:                               TID-Meals,           [facility]

nn



               00                                 # 180 tab,           



                                                  0              



                                                  Refill(s)           

 

     sevelamer      2021      Yes                      1,600 mg =           Me

[facility]



     800 mg oral      2-27                               2 tab, PO,           l



     tablet      21:21:                               TID-Meals,           [facility]

nn



               00                                 # 180 tab,           



                                                  0              



                                                  Refill(s)           

 

     sevelamer      2021      Yes                      1,600 mg =           Me

[facility]



     800 mg oral      2-27                               2 tab, PO,           l



     tablet      21:21:                               TID-Meals,           [facility]

nn



               00                                 # 180 tab,           



                                                  0              



                                                  Refill(s)           

 

     sevelamer      2021      Yes                      1,600 mg =           Me

[facility]



     800 mg oral      2-27                               2 tab, PO,           l



     tablet      21:21:                               TID-Meals,           [facility]

nn



               00                                 # 180 tab,           



                                                  0              



                                                  Refill(s)           

 

     sevelamer      2021      Yes                      1,600 mg =           Me

[facility]



     800 mg oral      2-27                               2 tab, PO,           l



     tablet      21:21:                               TID-Meals,           [facility]

nn



               00                                 # 180 tab,           



                                                  0              



                                                  Refill(s)           

 

     sevelamer      2021      Yes                      1,600 mg =           Me

[facility]



     800 mg oral      2-27                               2 tab, PO,           l



     tablet      21:21:                               TID-Meals,           [facility]

nn



               00                                 # 180 tab,           



                                                  0              



                                                  Refill(s)           

 

     sevelamer      2021      Yes                      1,600 mg =           Me

[facility]



     800 mg oral      2-27                               2 tab, PO,           l



     tablet      21:21:                               TID-Meals,           [facility]

nn



               00                                 # 180 tab,           



                                                  0              



                                                  Refill(s)           

 

     Mupirocin      2021      Yes                      1 appl,           [facility]

ia



     0.02 MG/MG      1-22                               TOP, TID,           l



     Topical      23:21:                               X 5 day, #           [facility]

daryl



     Ointment      00                                 22 gm, 0           



                                                  Refill(s),           



                                                  [facility]           



                                                  [facility]           



                                                  #[id number],           



                                                  165.1, cm,           



                                                  21           



                                                  14:08:00           



                                                  CST,           



                                                  Height,           



                                                  136.42,           



                                                  kg,            



                                                  21           



                                                  14:08:00           



                                                  CST,           



                                                  Weight           

 

     Mupirocin      2021      Yes                      1 appl,           [facility]

ia



     0.02 MG/MG      1-22                               TOP, TID,           l



     Topical      23:21:                               X 5 day, #           [facility]

daryl



     Ointment      00                                 22 gm, 0           



                                                  Refill(s),           



                                                  Pharmacy:           



                                                  [facility]           



                                                  #[id number],           



                                                  165.1, cm,           



                                                  21           



                                                  14:08:00           



                                                  CST,           



                                                  Height,           



                                                  136.42,           



                                                  kg,            



                                                  21           



                                                  14:08:00           



                                                  CST,           



                                                  Weight           

 

     Mupirocin      2021      Yes                      1 appl,           [facility]

[facility]



     0.02 MG/MG      1-22                               TOP, TID,           l



     Topical      23:21:                               X 5 day, #           [facility]

daryl



     Ointment      00                                 22 gm, 0           



                                                  Refill(s),           



                                                  Pharmacy:           



                                                  [facility]           



                                                  #[id number],           



                                                  165.1, cm,           



                                                  21           



                                                  14:08:00           



                                                  CST,           



                                                  Height,           



                                                  136.42,           



                                                  kg,            



                                                  21           



                                                  14:08:00           



                                                  CST,           



                                                  Weight           

 

     Mupirocin      2021      Yes                      1 appl,           [facility]

ia



     0.02 MG/MG      1-22                               TOP, TID,           l



     Topical      23:21:                               X 5 day, #           [facility]

daryl



     Ointment      00                                 22 gm, 0           



                                                  Refill(s),           



                                                  Pharmacy:           



                                                  [facility]           



                                                  [facility]           



                                                  #[id number],           



                                                  165.1, cm,           



                                                  21           



                                                  14:08:00           



                                                  CST,           



                                                  Height,           



                                                  136.42,           



                                                  kg,            



                                                  21           



                                                  14:08:00           



                                                  CST,           



                                                  Weight           

 

     Mupirocin      -      Yes                      1 appl,           [facility]

ia



     0.02 MG/MG      1-22                               TOP, TID,           l



     Topical      23:21:                               X 5 day, #           [facility]

daryl



     Ointment      00                                 22 gm, 0           



                                                  Refill(s),           



                                                  Pharmacy:           



                                                  [facility]           



                                                  #[id number],           



                                                  165.1, cm,           



                                                  21           



                                                  14:08:00           



                                                  CST,           



                                                  Height,           



                                                  136.42,           



                                                  kg,            



                                                  21           



                                                  14:08:00           



                                                  CST,           



                                                  Weight           

 

     Mupirocin      -      Yes                      1 appl,           [facility]

ia



     0.02 MG/MG      1-22                               TOP, TID,           l



     Topical      23:21:                               X 5 day, #           [facility]

daryl



     Ointment      00                                 22 gm, 0           



                                                  Refill(s),           



                                                  Pharmacy:           



                                                  [facility]           



                                                  #[id number],           



                                                  165.1, cm,           



                                                  21           



                                                  14:08:00           



                                                  CST,           



                                                  Height,           



                                                  136.42,           



                                                  kg,            



                                                  21           



                                                  14:08:00           



                                                  CST,           



                                                  Weight           

 

     Mupirocin      2021      Yes                      1 appl,           [facility]

[facility]



     0.02 MG/MG      1-22                               TOP, TID,           l



     Topical      23:21:                               X 5 day, #           [facility]

[facility]



     Ointment      00                                 22 gm, 0           



                                                  Refill(s),           



                                                  Pharmacy:           



                                                  [facility]           



                                                  #[id number],           



                                                  165.1, cm,           



                                                  21           



                                                  14:08:00           



                                                  CST,           



                                                  Height,           



                                                  136.42,           



                                                  kg,            



                                                  21           



                                                  14:08:00           



                                                  CST,           



                                                  Weight           

 

     Mupirocin      -      Yes                      1 appl,           [facility]

[facility]



     0.02 MG/MG      1-22                               TOP, TID,           l



     Topical      23:21:                               X 5 day, #           [facility]

[facility]



     Ointment      00                                 22 gm, 0           



                                                  Refill(s),           



                                                  Pharmacy:           



                                                  [facility]           



                                                  #[id number],           



                                                  165.1, cm,           



                                                  21           



                                                  14:08:00           



                                                  CST,           



                                                  Height,           



                                                  136.42,           



                                                  kg,            



                                                  21           



                                                  14:08:00           



                                                  CST,           



                                                  Weight           

 

     Mupirocin      -      Yes                      1 appl,           [facility]

[facility]



     0.02 MG/MG      1-22                               TOP, TID,           l



     Topical      23:21:                               X 5 day, #           [facility]

[facility]



     Ointment      00                                 22 gm, 0           



                                                  Refill(s),           



                                                  Pharmacy:           



                                                  [facility]           



                                                  [facility]           



                                                  #[id number],           



                                                  165.1, cm,           



                                                  21           



                                                  14:08:00           



                                                  CST,           



                                                  Height,           



                                                  136.42,           



                                                  kg,            



                                                  21           



                                                  14:08:00           



                                                  CST,           



                                                  Weight           

 

     bumetanide      2021      Yes                      2 mg = 1           [facility]

[facility]



     2 mg oral      1-19                               tab, PO,           l



     tablet      21:11:                               Daily, #           [facility]



               00                                 30 tab, 0           



                                                  Refill(s)           

 

     bumetanide      2021      Yes                      2 mg = 1           [facility]

[facility]



     2 mg oral      1-19                               tab, PO,           l



     tablet      21:11:                               Daily, #           [facility]



               00                                 30 tab, 0           



                                                  Refill(s)           

 

     bumetanide      2021      Yes                      2 mg = 1           [facility]

[facility]



     2 mg oral      1-19                               tab, PO,           l



     tablet      21:11:                               Daily, #           [facility]



               00                                 30 tab, 0           



                                                  Refill(s)           

 

     bumetanide      2021      Yes                      2 mg = 1           [facility]

[facility]



     2 mg oral      1-19                               tab, PO,           l



     tablet      21:11:                               Daily, #           [facility]



               00                                 30 tab, 0           



                                                  Refill(s)           

 

     bumetanide      2021      Yes                      2 mg = 1           [facility]

[facility]



     2 mg oral      1-19                               tab, PO,           l



     tablet      21:11:                               Daily, #           [facility]



               00                                 30 tab, 0           



                                                  Refill(s)           

 

     bumetanide      2021      Yes                      2 mg = 1           [facility]

[facility]



     2 mg oral      1-19                               tab, PO,           l



     tablet      21:11:                               Daily, #           [facility]



               00                                 30 tab, 0           



                                                  Refill(s)           

 

     bumetanide      2021      Yes                      2 mg = 1           [facility]

[facility]



     2 mg oral      1-19                               tab, PO,           l



     tablet      21:11:                               Daily, #           [facility]



               00                                 30 tab, 0           



                                                  Refill(s)           

 

     bumetanide      2021      Yes                      2 mg = 1           [facility]

[facility]



     2 mg oral      1-19                               tab, PO,           l



     tablet      21:11:                               Daily, #           [facility]



               00                                 30 tab, 0           



                                                  Refill(s)           

 

     bumetanide      2021      Yes                      2 mg = 1           [facility]

[facility]



     2 mg oral      1-19                               tab, PO,           l



     tablet      21:11:                               Daily, #           [facility]



               00                                 30 tab, 0           



                                                  Refill(s)           

 

     allopurinol      2021      Yes                      100 mg = 1           

[facility]



     100 mg oral      1-19                               tab, PO,           l



     tablet      21:10:                               BID, # 60           [facility]

n



               00                                 tab, 0           



                                                  Refill(s)           

 

     gabapentin      2021      Yes                      200 mg = 2           [facility]

[facility]



     100 MG Oral      1-19                               cap, PO,           l



     Capsule      21:10:                               Bedtime, 0           [facility]

[facility]



               00                                 Refill(s)           

 

     allopurinol      2021      Yes                      100 mg = 1           

[facility]



     100 mg oral      1-19                               tab, PO,           l



     tablet      21:10:                               BID, # 60           [facility]

n



               00                                 tab, 0           



                                                  Refill(s)           

 

     gabapentin      2021      Yes                      200 mg = 2           [facility]

[facility]



     100 MG Oral      1-19                               cap, PO,           l



     Capsule      21:10:                               Bedtime, 0           [facility]

[facility]



               00                                 Refill(s)           

 

     allopurinol      2021      Yes                      100 mg = 1           

[facility]



     100 mg oral      1-19                               tab, PO,           l



     tablet      21:10:                               BID, # 60           [facility]

n



               00                                 tab, 0           



                                                  Refill(s)           

 

     gabapentin      2021      Yes                      200 mg = 2           [facility]

[facility]



     100 MG Oral      1-19                               cap, PO,           l



     Capsule      21:10:                               Bedtime, 0           [facility]

[facility]



               00                                 Refill(s)           

 

     allopurinol      2021      Yes                      100 mg = 1           

[facility]



     100 mg oral      1-19                               tab, PO,           l



     tablet      21:10:                               BID, # 60           [facility]

n



               00                                 tab, 0           



                                                  Refill(s)           

 

     gabapentin      2021      Yes                      200 mg = 2           [facility]

[facility]



     100 MG Oral      1-19                               cap, PO,           l



     Capsule      21:10:                               Bedtime, 0           [facility]

[facility]



               00                                 Refill(s)           

 

     allopurinol      2021      Yes                      100 mg = 1           

[facility]



     100 mg oral      1-19                               tab, PO,           l



     tablet      21:10:                               BID, # 60           [facility]

n



               00                                 tab, 0           



                                                  Refill(s)           

 

     gabapentin      2021      Yes                      200 mg = 2           [facility]

[facility]



     100 MG Oral      1-19                               cap, PO,           l



     Capsule      21:10:                               Bedtime, 0           [facility]

[facility]



               00                                 Refill(s)           

 

     allopurinol      2021      Yes                      100 mg = 1           

[facility]



     100 mg oral      1-19                               tab, PO,           l



     tablet      21:10:                               BID, # 60           [facility]

n



               00                                 tab, 0           



                                                  Refill(s)           

 

     gabapentin      2021      Yes                      200 mg = 2           [facility]

[facility]



     100 MG Oral      1-19                               cap, PO,           l



     Capsule      21:10:                               Bedtime, 0           [facility]

[facility]



               00                                 Refill(s)           

 

     allopurinol      2021      Yes                      100 mg = 1           

[facility]



     100 mg oral      1-19                               tab, PO,           l



     tablet      21:10:                               BID, # 60           [facility]

n



               00                                 tab, 0           



                                                  Refill(s)           

 

     gabapentin      2021      Yes                      200 mg = 2           [facility]

[facility]



     100 MG Oral      1-19                               cap, PO,           l



     Capsule      21:10:                               Bedtime, 0           [facility]

[facility]



               00                                 Refill(s)           

 

     allopurinol      2021      Yes                      100 mg = 1           

[facility]



     100 mg oral      1-19                               tab, PO,           l



     tablet      21:10:                               BID, # 60           [facility]

n



               00                                 tab, 0           



                                                  Refill(s)           

 

     gabapentin      2021      Yes                      200 mg = 2           [facility]

[facility]



     100 MG Oral      1-19                               cap, PO,           l



     Capsule      21:10:                               Bedtime, 0           [facility]

[facility]



               00                                 Refill(s)           

 

     allopurinol      2021      Yes                      100 mg = 1           

[facility]



     100 mg oral      1-19                               tab, PO,           l



     tablet      21:10:                               BID, # 60           [facility]

n



               00                                 tab, 0           



                                                  Refill(s)           

 

     gabapentin      2021      Yes                      200 mg = 2           [facility]

[facility]



     100 MG Oral      1-19                               cap, PO,           l



     Capsule      21:10:                               Bedtime, 0           [facility]

[facility]



               00                                 Refill(s)           

 

     midodrine      2021      Yes            10mg [id number] Take 10 mg       

    [facility]



     (PROAMATINE      1-13                          [id number] by mouth 3       

    st



     ) 10 MG      00:00:                          3D   (three)           [facility]



     tablet      00                                 times a           l



                                                  day.           

 

     midodrine      2021      Yes            10mg Q.[id number] Take 10 mg       

    [facility]



     (PROAMATINE      1-13                          [id number] by mouth 3       

    st



     ) 10 MG      00:00:                          3D   (three)           [facility]



     tablet      00                                 times a           l



                                                  day.           

 

     midodrine      2021      Yes            10mg Q.[id number] Take 10 mg       

    [facility]



     (PROAMATINE      1-13                          [id number] by mouth 3       

    st



     ) 10 MG      00:00:                          3D   (three)           [facility]



     tablet      00                                 times a           l



                                                  day.           

 

     BUMETanide      2021      Yes            2mg  QD   Take 2 mg           [facility]

[facility]



     (BUMEX) 2      1-12                               by mouth           st



     MG tablet      00:00:                               every           [facility]



               00                                 morning.           l

 

     digOXIN      2021      Yes            125ug Q.[id number] Take 125          

 [facility]



     (LANOXIN)      1-12                          [id number] mcg by           st



     125 mcg      00:00:                          3W   mouth 3           [facility]



     (0.125 mg)      00                                 (three)           l



     tablet                                         times a           



                                                  week. On           



                                                  dialysis           



                                                  ,             



                                                  Thursday,           



                                                  Saturday           

 

     BUMETanide      2021      Yes            2mg  QD   Take 2 mg           [facility]

[facility]



     (BUMEX) 2      -12                               by mouth           st



     MG tablet      00:00:                               every           [facility]



               00                                 morning.           l

 

     digOXIN      2021      Yes            125ug Q.[id number] Take 125          

 [facility]



     (LANOXIN)      1-                          [id number] mcg by           st



     125 mcg      00:00:                          3W   mouth 3           [facility]



     (0.125 mg)      00                                 (three)           l



     tablet                                         times a           



                                                  week. On           



                                                  dialysis           



                                                  ,             



                                                  Thursday,           



                                                  Saturday           

 

     BUMETanide      2021      Yes            2mg  QD   Take 2 mg           [facility]

[facility]



     (BUMEX) 2      -                               by mouth           st



     MG tablet      00:00:                               every           [facility]



               00                                 morning.           l

 

     digOXIN      2021      Yes            125ug Q.[id number] Take 125          

 [facility]



     (LANOXIN)      1-                          [id number] mcg by           st



     125 mcg      00:00:                          3W   mouth 3           [facility]



     (0.125 mg)      00                                 (three)           l



     tablet                                         times a           



                                                  week. On           



                                                  dialysis           



                                                  ,             



                                                  Thursday,           



                                                  Saturday           

 

     carvediloL      2021- No                                      [facility]

i



     (COREG)                                               st



     3.125 MG      00:00: 00:00                                         [facility]



     tablet      00   :00                                          l

 

     carvediloL      2021- No                                      [facility]

i



     (COREG)                                               st



     3.125 MG      00:00: 00:00                                         [facility]



     tablet      00   :00                                          l

 

     carvediloL      2021- No                                      [facility]

i



     (COREG)                                               st



     3.125 MG      00:00: 00:00                                         [facility]



     tablet      00   :00                                          l

 

     ipratropium      2021- No        [id number] .5mg Q.25D Take 2.5      

     [facility]



     (ATROVENT)      005                           mL (0.5 mg           st



     0.02 %      00:00: 00:00                          total) by           [facility]

ta



     nebulizer      00   :00                           nebulizati           l



     solution                                         on 4           



                                                  (four)           



                                                  times a           



                                                  day.           

 

     ipratropium      -2022- No        [id number] .5mg Q.25D Take 2.5      

     [facility]



     (ATROVENT)      0-05 02-28                          mL (0.5 mg           st



     0.02 %      00:00: 00:00                          total) by           [facility]

ta



     nebulizer      00   :00                           nebulizati           l



     solution                                         on 4           



                                                  (four)           



                                                  times a           



                                                  day.           

 

     ipratropium      2021- No        [id number] .5mg Q.25D Take 2.5      

     [facility]



     (ATROVENT)      0-05 02-28                          mL (0.5 mg           st



     0.02 %      00:00: 00:00                          total) by           [facility]

ta



     nebulizer      00   :00                           nebulizati           l



     solution                                         on 4           



                                                  (four)           



                                                  times a           



                                                  day.           

 

     QUEtiapine      2021-0      Yes                      1 tablet,           [facility]

[facility]



     50 mg oral      3-30                               once a           l



     tablet      13:55:                               day, 0           [facility]



               00                                 Refill(s)           

 

     torsemide      2021-0      Yes                      1 tablet,           [facility]

[facility]



     20 mg oral      3-30                               twice a           l



     tablet      13:55:                               day, 0           [facility]



               00                                 Refill(s)           

 

     QUEtiapine      2021-0      Yes                      1 tablet,           [facility]

[facility]



     50 mg oral      3-30                               once a           l



     tablet      13:55:                               day, 0           [facility]



               00                                 Refill(s)           

 

     torsemide      2021-0      Yes                      1 tablet,           [facility]

[facility]



     20 mg oral      3-30                               twice a           l



     tablet      13:55:                               day, 0           [facility]



               00                                 Refill(s)           

 

     QUEtiapine      2021-0      Yes                      1 tablet,           [facility]

[facility]



     50 mg oral      3-30                               once a           l



     tablet      13:55:                               day, 0           [facility]



               00                                 Refill(s)           

 

     torsemide      2021-0      Yes                      1 tablet,           [facility]

[facility]



     20 mg oral      3-30                               twice a           l



     tablet      13:55:                               day, 0           [facility]



               00                                 Refill(s)           

 

     QUEtiapine      2021-0      Yes                      1 tablet,           [facility]

[facility]



     50 mg oral      3-30                               once a           l



     tablet      13:55:                               day, 0           [facility]



               00                                 Refill(s)           

 

     torsemide      2021-0      Yes                      1 tablet,           [facility]

[facility]



     20 mg oral      3-30                               twice a           l



     tablet      13:55:                               day, 0           [facility]



               00                                 Refill(s)           

 

     QUEtiapine      2021-0      Yes                      1 tablet,           [facility]

[facility]



     50 mg oral      3-30                               once a           l



     tablet      13:55:                               day, 0           [facility]



               00                                 Refill(s)           

 

     torsemide      2021-0      Yes                      1 tablet,           [facility]

[facility]



     20 mg oral      3-30                               twice a           l



     tablet      13:55:                               day, 0           [facility]



               00                                 Refill(s)           

 

     QUEtiapine      2021-0      Yes                      1 tablet,           [facility]

[facility]



     50 mg oral      3-30                               once a           l



     tablet      13:55:                               day, 0           [facility]



               00                                 Refill(s)           

 

     torsemide      2021-0      Yes                      1 tablet,           [facility]

[facility]



     20 mg oral      3-30                               twice a           l



     tablet      13:55:                               day, 0           [facility]



               00                                 Refill(s)           

 

     QUEtiapine      2021-0      Yes                      1 tablet,           [facility]

[facility]



     50 mg oral      3-30                               once a           l



     tablet      13:55:                               day, 0           [facility]



               00                                 Refill(s)           

 

     torsemide      2021-0      Yes                      1 tablet,           [facility]

[facility]



     20 mg oral      3-30                               twice a           l



     tablet      13:55:                               day, 0           [facility]



               00                                 Refill(s)           

 

     QUEtiapine      2021-0      Yes                      1 tablet,           [facility]

[facility]



     50 mg oral      3-30                               once a           l



     tablet      13:55:                               day, 0           [facility]



               00                                 Refill(s)           

 

     torsemide      2021-0      Yes                      1 tablet,           [facility]

[facility]



     20 mg oral      3-30                               twice a           l



     tablet      13:55:                               day, 0           [facility]



               00                                 Refill(s)           

 

     QUEtiapine      2021-0      Yes                      1 tablet,           [facility]

[facility]



     50 mg oral      3-30                               once a           l



     tablet      13:55:                               day, 0           [facility]



               00                                 Refill(s)           

 

     torsemide      2021-0      Yes                      1 tablet,           [facility]

[facility]



     20 mg oral      3-30                               twice a           l



     tablet      13:55:                               day, 0           [facility]



               00                                 Refill(s)           

 

     potassium      2021-0      Yes                      1 tablet,           [facility]

[facility]



     chloride 20      3-30                               once a           l



     mEq oral      13:54:                               day, 0           [facility]



     tablet,      00                                 Refill(s)           



     extended                                                        



     release                                                        



     (KCL)                                                        

 

     potassium      2021-0      Yes                      1 tablet,           [facility]

[facility]



     chloride 20      3-30                               once a           l



     mEq oral      13:54:                               day, 0           [facility]



     tablet,      00                                 Refill(s)           



     extended                                                        



     release                                                        



     (KCL)                                                        

 

     potassium      2021-0      Yes                      1 tablet,           [facility]

[facility]



     chloride 20      3-30                               once a           l



     mEq oral      13:54:                               day, 0           [facility]



     tablet,      00                                 Refill(s)           



     extended                                                        



     release                                                        



     (KCL)                                                        

 

     potassium      -0      Yes                      1 tablet,           [facility]

[facility]



     chloride 20      3-30                               once a           l



     mEq oral      13:54:                               day, 0           [facility]



     tablet,      00                                 Refill(s)           



     extended                                                        



     release                                                        



     (KCL)                                                        

 

     potassium      -0      Yes                      1 tablet,           [facility]

[facility]



     chloride 20      3-30                               once a           l



     mEq oral      13:54:                               day, 0           [facility]



     tablet,      00                                 Refill(s)           



     extended                                                        



     release                                                        



     (KCL)                                                        

 

     potassium      -0      Yes                      1 tablet,           [facility]

[facility]



     chloride 20      3-30                               once a           l



     mEq oral      13:54:                               day, 0           [facility]



     tablet,      00                                 Refill(s)           



     extended                                                        



     release                                                        



     (KCL)                                                        

 

     potassium      -0      Yes                      1 tablet,           [facility]

[facility]



     chloride 20      3-30                               once a           l



     mEq oral      13:54:                               day, 0           [facility]



     tablet,      00                                 Refill(s)           



     extended                                                        



     release                                                        



     (KCL)                                                        

 

     potassium      -0      Yes                      1 tablet,           [facility]

[facility]



     chloride 20      3-30                               once a           l



     mEq oral      13:54:                               day, 0           [facility]



     tablet,      00                                 Refill(s)           



     extended                                                        



     release                                                        



     (KCL)                                                        

 

     potassium      -0      Yes                      1 tablet,           [facility]

[facility]



     chloride 20      3-30                               once a           l



     mEq oral      13:54:                               day, 0           [facility]



     tablet,      00                                 Refill(s)           



     extended                                                        



     release                                                        



     (KCL)                                                        

 

     allopurinol            Yes                      1/2            [facility]

[facility]



     300 mg oral      3-30                               tablet,           l



     tablet      13:53:                               once a           [facility]



               00                                 day, 0           



                                                  Refill(s)           

 

     carvedilol            Yes                      1 tablet,           [facility]

[facility]



     3.125 mg      3-30                               twice a           l



     oral tablet      13:53:                               day, 0           [facility]

[facility]



               00                                 Refill(s)           

 

     Digoxin            Yes                      1 tablet,           [facility]

[facility]



     0.125 MG      3-30                               once a           l



     Oral Tablet      13:53:                               day, 0           [facility]

[facility]



               00                                 Refill(s)           

 

     DULoxetine      0      Yes                      1 capsule,           [facility]

[facility]



     60 mg oral      3-30                               once a           l



     delayed      13:53:                               day, 0           [facility]



     release      00                                 Refill(s)           



     capsule                                                        

 

     apixaban 5      0      Yes                      1 tablet,           [facility]

[facility]



     MG Oral      3-30                               twice a           l



     Tablet      13:53:                               day, 0           [facility]



     [Eliquis]      00                                 Refill(s)           

 

     gabapentin      0      Yes                      1 capsule,           [facility]

[facility]



     300 MG Oral      3-30                               twice a           l



     Capsule      13:53:                               day, 0           [facility]



               00                                 Refill(s)           

 

     metoprolol      0      Yes                      1 tablet,           [facility]

[facility]



     tartrate 50      3-30                               Twice a           l



     mg oral      13:53:                               day, 0           [facility]



     tablet      00                                 Refill(s)           

 

     midodrine 5      0      Yes                      1 tablet,           [facility]

[facility]



     mg oral      3-30                               twice a           l



     tablet      13:53:                               day, 0           [facility]



               00                                 Refill(s)           

 

     allopurinol            Yes                      1/2            [facility]

[facility]



     300 mg oral      3-30                               tablet,           l



     tablet      13:53:                               once a           [facility]



               00                                 day, 0           



                                                  Refill(s)           

 

     carvedilol      0      Yes                      1 tablet,           [facility]

[facility]



     3.125 mg      3-30                               twice a           l



     oral tablet      13:53:                               day, 0           [facility]

[facility]



               00                                 Refill(s)           

 

     Digoxin            Yes                      1 tablet,           [facility]

[facility]



     0.125 MG      3-30                               once a           l



     Oral Tablet      13:53:                               day, 0           [facility]

[facility]



               00                                 Refill(s)           

 

     DULoxetine            Yes                      1 capsule,           [facility]

[facility]



     60 mg oral      3-30                               once a           l



     delayed      13:53:                               day, 0           [facility]



     release      00                                 Refill(s)           



     capsule                                                        

 

     apixaban 5            Yes                      1 tablet,           [facility]

[facility]



     MG Oral      3-30                               twice a           l



     Tablet      13:53:                               day, 0           [facility]



     [Eliquis]      00                                 Refill(s)           

 

     gabapentin      0      Yes                      1 capsule,           [facility]

[facility]



     300 MG Oral      3-30                               twice a           l



     Capsule      13:53:                               day, 0           [facility]



               00                                 Refill(s)           

 

     metoprolol      0      Yes                      1 tablet,           [facility]

[facility]



     tartrate 50      3-30                               Twice a           l



     mg oral      13:53:                               day, 0           [facility]



     tablet      00                                 Refill(s)           

 

     midodrine 5      0      Yes                      1 tablet,           [facility]

[facility]



     mg oral      3-30                               twice a           l



     tablet      13:53:                               day, 0           [facility]



               00                                 Refill(s)           

 

     allopurinol      0      Yes                      1/2            [facility]

[facility]



     300 mg oral      3-30                               tablet,           l



     tablet      13:53:                               once a           [facility]



               00                                 day, 0           



                                                  Refill(s)           

 

     carvedilol      0      Yes                      1 tablet,           [facility]

[facility]



     3.125 mg      3-30                               twice a           l



     oral tablet      13:53:                               day, 0           [facility]

[facility]



               00                                 Refill(s)           

 

     Digoxin      0      Yes                      1 tablet,           [facility]

[facility]



     0.125 MG      3-30                               once a           l



     Oral Tablet      13:53:                               day, 0           [facility]

[facility]



               00                                 Refill(s)           

 

     DULoxetine      -0      Yes                      1 capsule,           [facility]

[facility]



     60 mg oral      3-30                               once a           l



     delayed      13:53:                               day, 0           [facility]



     release      00                                 Refill(s)           



     capsule                                                        

 

     apixaban 5            Yes                      1 tablet,           [facility]

[facility]



     MG Oral      3-30                               twice a           l



     Tablet      13:53:                               day, 0           [facility]



     [Eliquis]      00                                 Refill(s)           

 

     gabapentin      0      Yes                      1 capsule,           [facility]

[facility]



     300 MG Oral      3-30                               twice a           l



     Capsule      13:53:                               day, 0           [facility]



               00                                 Refill(s)           

 

     metoprolol            Yes                      1 tablet,           [facility]

[facility]



     tartrate 50      3-30                               Twice a           l



     mg oral      13:53:                               day, 0           [facility]



     tablet      00                                 Refill(s)           

 

     midodrine 5            Yes                      1 tablet,           [facility]

[facility]



     mg oral      3-30                               twice a           l



     tablet      13:53:                               day, 0           [facility]



               00                                 Refill(s)           

 

     allopurinol            Yes                      1/2            [facility]

[facility]



     300 mg oral      3-30                               tablet,           l



     tablet      13:53:                               once a           [facility]



               00                                 day, 0           



                                                  Refill(s)           

 

     carvedilol            Yes                      1 tablet,           [facility]

[facility]



     3.125 mg      3-30                               twice a           l



     oral tablet      13:53:                               day, 0           [facility]

[facility]



               00                                 Refill(s)           

 

     Digoxin      -0      Yes                      1 tablet,           [facility]

[facility]



     0.125 MG      3-30                               once a           l



     Oral Tablet      13:53:                               day, 0           [facility]

[facility]



               00                                 Refill(s)           

 

     DULoxetine      0      Yes                      1 capsule,           [facility]

[facility]



     60 mg oral      3-30                               once a           l



     delayed      13:53:                               day, 0           [facility]



     release      00                                 Refill(s)           



     capsule                                                        

 

     apixaban 5            Yes                      1 tablet,           [facility]

[facility]



     MG Oral      3-30                               twice a           l



     Tablet      13:53:                               day, 0           [facility]



     [Eliquis]      00                                 Refill(s)           

 

     gabapentin      -0      Yes                      1 capsule,           [facility]

[facility]



     300 MG Oral      3-30                               twice a           l



     Capsule      13:53:                               day, 0           [facility]



               00                                 Refill(s)           

 

     metoprolol      -0      Yes                      1 tablet,           [facility]

[facility]



     tartrate 50      3-30                               Twice a           l



     mg oral      13:53:                               day, 0           [facility]



     tablet      00                                 Refill(s)           

 

     midodrine 5      0      Yes                      1 tablet,           [facility]

[facility]



     mg oral      3-30                               twice a           l



     tablet      13:53:                               day, 0           [facility]



               00                                 Refill(s)           

 

     allopurinol      -0      Yes                      1/2            [facility]

[facility]



     300 mg oral      3-30                               tablet,           l



     tablet      13:53:                               once a           [facility]



               00                                 day, 0           



                                                  Refill(s)           

 

     carvedilol      0      Yes                      1 tablet,           [facility]

[facility]



     3.125 mg      3-30                               twice a           l



     oral tablet      13:53:                               day, 0           [facility]

[facility]



               00                                 Refill(s)           

 

     Digoxin      0      Yes                      1 tablet,           [facility]

[facility]



     0.125 MG      3-30                               once a           l



     Oral Tablet      13:53:                               day, 0           [facility]

[facility]



               00                                 Refill(s)           

 

     DULoxetine      0      Yes                      1 capsule,           [facility]

[facility]



     60 mg oral      3-30                               once a           l



     delayed      13:53:                               day, 0           [facility]



     release      00                                 Refill(s)           



     capsule                                                        

 

     apixaban 5      -0      Yes                      1 tablet,           [facility]

[facility]



     MG Oral      3-30                               twice a           l



     Tablet      13:53:                               day, 0           [facility]



     [Eliquis]      00                                 Refill(s)           

 

     gabapentin      -0      Yes                      1 capsule,           [facility]

[facility]



     300 MG Oral      3-30                               twice a           l



     Capsule      13:53:                               day, 0           [facility]



               00                                 Refill(s)           

 

     metoprolol      -0      Yes                      1 tablet,           [facility]

[facility]



     tartrate 50      3-30                               Twice a           l



     mg oral      13:53:                               day, 0           [facility]



     tablet      00                                 Refill(s)           

 

     midodrine 5      0      Yes                      1 tablet,           [facility]

[facility]



     mg oral      3-30                               twice a           l



     tablet      13:53:                               day, 0           [facility]



               00                                 Refill(s)           

 

     allopurinol      -0      Yes                      1/2            [facility]

[facility]



     300 mg oral      3-30                               tablet,           l



     tablet      13:53:                               once a           [facility]



               00                                 day, 0           



                                                  Refill(s)           

 

     carvedilol      -0      Yes                      1 tablet,           [facility]

[facility]



     3.125 mg      3-30                               twice a           l



     oral tablet      13:53:                               day, 0           [facility]

[facility]



               00                                 Refill(s)           

 

     Digoxin      0      Yes                      1 tablet,           [facility]

[facility]



     0.125 MG      3-30                               once a           l



     Oral Tablet      13:53:                               day, 0           [facility]

[facility]



               00                                 Refill(s)           

 

     DULoxetine      0      Yes                      1 capsule,           [facility]

[facility]



     60 mg oral      3-30                               once a           l



     delayed      13:53:                               day, 0           [facility]



     release      00                                 Refill(s)           



     capsule                                                        

 

     apixaban 5      0      Yes                      1 tablet,           [facility]

[facility]



     MG Oral      3-30                               twice a           l



     Tablet      13:53:                               day, 0           [facility]



     [Eliquis]      00                                 Refill(s)           

 

     gabapentin            Yes                      1 capsule,           [facility]

[facility]



     300 MG Oral      3-30                               twice a           l



     Capsule      13:53:                               day, 0           [facility]



               00                                 Refill(s)           

 

     metoprolol            Yes                      1 tablet,           Me

[facility]



     tartrate 50      3-30                               Twice a           l



     mg oral      13:53:                               day, 0           [facility]



     tablet      00                                 Refill(s)           

 

     midodrine 5            Yes                      1 tablet,           [facility]

[facility]



     mg oral      3-30                               twice a           l



     tablet      13:53:                               day, 0           [facility]



               00                                 Refill(s)           

 

     allopurinol            Yes                      1/2            [facility]

a



     300 mg oral      3-30                               tablet,           l



     tablet      13:53:                               once a           [facility]



               00                                 day, 0           



                                                  Refill(s)           

 

     carvedilol            Yes                      1 tablet,           [facility]

[facility]



     3.125 mg      3-30                               twice a           l



     oral tablet      13:53:                               day, 0           [facility]

[facility]



               00                                 Refill(s)           

 

     Digoxin      0      Yes                      1 tablet,           [facility]

[facility]



     0.125 MG      3-30                               once a           l



     Oral Tablet      13:53:                               day, 0           [facility]

[facility]



               00                                 Refill(s)           

 

     DULoxetine      0      Yes                      1 capsule,           [facility]

[facility]



     60 mg oral      3-30                               once a           l



     delayed      13:53:                               day, 0           [facility]



     release      00                                 Refill(s)           



     capsule                                                        

 

     apixaban 5      0      Yes                      1 tablet,           [facility]

[facility]



     MG Oral      3-30                               twice a           l



     Tablet      13:53:                               day, 0           [facility]



     [Eliquis]      00                                 Refill(s)           

 

     gabapentin      -0      Yes                      1 capsule,           [facility]

[facility]



     300 MG Oral      3-30                               twice a           l



     Capsule      13:53:                               day, 0           [facility]



               00                                 Refill(s)           

 

     metoprolol      2021-0      Yes                      1 tablet,           [facility]

[facility]



     tartrate 50      3-30                               Twice a           l



     mg oral      13:53:                               day, 0           [facility]



     tablet      00                                 Refill(s)           

 

     midodrine 5      0      Yes                      1 tablet,           M

[facility]



     mg oral      3-30                               twice a           l



     tablet      13:53:                               day, 0           [facility]



               00                                 Refill(s)           

 

     allopurinol            Yes                      1/2            [facility]

a



     300 mg oral      3-30                               tablet,           l



     tablet      13:53:                               once a           [facility]



               00                                 day, 0           



                                                  Refill(s)           

 

     carvedilol            Yes                      1 tablet,           [facility]

[facility]



     3.125 mg      3-30                               twice a           l



     oral tablet      13:53:                               day, 0           [facility]

[facility]



               00                                 Refill(s)           

 

     Digoxin            Yes                      1 tablet,           [facility]

[facility]



     0.125 MG      3-30                               once a           l



     Oral Tablet      13:53:                               day, 0           [facility]

[facility]



               00                                 Refill(s)           

 

     DULoxetine            Yes                      1 capsule,           M

[facility]



     60 mg oral      3-30                               once a           l



     delayed      13:53:                               day, 0           [facility]



     release      00                                 Refill(s)           



     capsule                                                        

 

     apixaban 5            Yes                      1 tablet,           [facility]

[facility]



     MG Oral      3-30                               twice a           l



     Tablet      13:53:                               day, 0           [facility]



     [Eliquis]      00                                 Refill(s)           

 

     gabapentin      0      Yes                      1 capsule,           [facility]

[facility]



     300 MG Oral      3-30                               twice a           l



     Capsule      13:53:                               day, 0           [facility]



               00                                 Refill(s)           

 

     metoprolol      0      Yes                      1 tablet,           [facility]

[facility]



     tartrate 50      3-30                               Twice a           l



     mg oral      13:53:                               day, 0           [facility]



     tablet      00                                 Refill(s)           

 

     midodrine 5      0      Yes                      1 tablet,           [facility]

[facility]



     mg oral      3-30                               twice a           l



     tablet      13:53:                               day, 0           [facility]



               00                                 Refill(s)           

 

     allopurinol            Yes                      1/2            [facility]

a



     300 mg oral      3-30                               tablet,           l



     tablet      13:53:                               once a           [facility]



               00                                 day, 0           



                                                  Refill(s)           

 

     carvedilol            Yes                      1 tablet,           [facility]

[facility]



     3.125 mg      3-30                               twice a           l



     oral tablet      13:53:                               day, 0           [facility]

[facility]



               00                                 Refill(s)           

 

     Digoxin      0      Yes                      1 tablet,           [facility]

[facility]



     0.125 MG      3-30                               once a           l



     Oral Tablet      13:53:                               day, 0           [facility]

[facility]



               00                                 Refill(s)           

 

     DULoxetine            Yes                      1 capsule,           [facility]

[facility]



     60 mg oral      3-30                               once a           l



     delayed      13:53:                               day, 0           [facility]



     release      00                                 Refill(s)           



     capsule                                                        

 

     apixaban 5            Yes                      1 tablet,           [facility]

[facility]



     MG Oral      3-30                               twice a           l



     Tablet      13:53:                               day, 0           [facility]



     [Eliquis]      00                                 Refill(s)           

 

     gabapentin            Yes                      1 capsule,           [facility]

[facility]



     300 MG Oral      3-30                               twice a           l



     Capsule      13:53:                               day, 0           [facility]



               00                                 Refill(s)           

 

     metoprolol            Yes                      1 tablet,           [facility]

[facility]



     tartrate 50      3-30                               Twice a           l



     mg oral      13:53:                               day, 0           [facility]



     tablet      00                                 Refill(s)           

 

     midodrine 5            Yes                      1 tablet,           [facility]

[facility]



     mg oral      3-30                               twice a           l



     tablet      13:53:                               day, 0           [facility]



               00                                 Refill(s)           

 

     DULoxetine      2020      Yes                      60 mg = 1           [facility]

[facility]



     60 mg oral      1-25                               cap, PO,           l



     delayed      17:17:                               Daily, #           [facility]

n



     release      00                                 30 cap, 0           



     capsule                                         Refill(s)           

 

     Spironolact      2020      Yes                      25 mg = 1           [facility]

[facility]



     one 25 MG      1-25                               tab, PO,           l



     Oral Tablet      17:17:                               Daily, 0           [facility]

[facility]



     [Aldactone]      00                                 Refill(s)           

 

     DULoxetine      2020      Yes                      60 mg = 1           [facility]

[facility]



     60 mg oral      1-25                               cap, PO,           l



     delayed      17:17:                               Daily, #           [facility]

n



     release      00                                 30 cap, 0           



     capsule                                         Refill(s)           

 

     Spironolact      2020      Yes                      25 mg = 1           M

[facility]



     one 25 MG      1-25                               tab, PO,           l



     Oral Tablet      17:17:                               Daily, 0           [facility]

[facility]



     [Aldactone]      00                                 Refill(s)           

 

     DULoxetine      2020      Yes                      60 mg = 1           [facility]

[facility]



     60 mg oral      1-25                               cap, PO,           l



     delayed      17:17:                               Daily, #           [facility]

n



     release      00                                 30 cap, 0           



     capsule                                         Refill(s)           

 

     Spironolact      2020      Yes                      25 mg = 1           M

[facility]



     one 25 MG      1-25                               tab, PO,           l



     Oral Tablet      17:17:                               Daily, 0           [facility]

[facility]



     [Aldactone]      00                                 Refill(s)           

 

     DULoxetine      2020      Yes                      60 mg = 1           [facility]

[facility]



     60 mg oral      1-25                               cap, PO,           l



     delayed      17:17:                               Daily, #           [facility]

n



     release      00                                 30 cap, 0           



     capsule                                         Refill(s)           

 

     Spironolact      2020      Yes                      25 mg = 1           [facility]

[facility]



     one 25 MG      1-25                               tab, PO,           l



     Oral Tablet      17:17:                               Daily, 0           [facility]

[facility]



     [Aldactone]      00                                 Refill(s)           

 

     DULoxetine      2020      Yes                      60 mg = 1           [facility]

[facility]



     60 mg oral      1-25                               cap, PO,           l



     delayed      17:17:                               Daily, #           [facility]

n



     release      00                                 30 cap, 0           



     capsule                                         Refill(s)           

 

     Spironolact      2020      Yes                      25 mg = 1           M

[facility]



     one 25 MG      1-25                               tab, PO,           l



     Oral Tablet      17:17:                               Daily, 0           [facility]

[facility]



     [Aldactone]      00                                 Refill(s)           

 

     DULoxetine      2020      Yes                      60 mg = 1           [facility]

[facility]



     60 mg oral      1-25                               cap, PO,           l



     delayed      17:17:                               Daily, #           [facility]

n



     release      00                                 30 cap, 0           



     capsule                                         Refill(s)           

 

     Spironolact      2020      Yes                      25 mg = 1           M

[facility]



     one 25 MG      1-25                               tab, PO,           l



     Oral Tablet      17:17:                               Daily, 0           [facility]

[facility]



     [Aldactone]      00                                 Refill(s)           

 

     DULoxetine      2020      Yes                      60 mg = 1           Me

[facility]



     60 mg oral      1-25                               cap, PO,           l



     delayed      17:17:                               Daily, #           [facility]

n



     release      00                                 30 cap, 0           



     capsule                                         Refill(s)           

 

     Spironolact      -      Yes                      25 mg = 1           M

[facility]



     one 25 MG      1-25                               tab, PO,           l



     Oral Tablet      17:17:                               Daily, 0           [facility]

[facility]



     [Aldactone]      00                                 Refill(s)           

 

     DULoxetine      2020      Yes                      60 mg = 1           [facility]

[facility]



     60 mg oral      1-25                               cap, PO,           l



     delayed      17:17:                               Daily, #           [facility]

n



     release      00                                 30 cap, 0           



     capsule                                         Refill(s)           

 

     Spironolact      -      Yes                      25 mg = 1           M

[facility]



     one 25 MG      1-25                               tab, PO,           l



     Oral Tablet      17:17:                               Daily, 0           [facility]

[facility]



     [Aldactone]      00                                 Refill(s)           

 

     DULoxetine      2020-1      Yes                      60 mg = 1           [facility]

[facility]



     60 mg oral      1-25                               cap, PO,           l



     delayed      17:17:                               Daily, #           [facility]

n



     release      00                                 30 cap, 0           



     capsule                                         Refill(s)           

 

     Spironolact      2020      Yes                      25 mg = 1           [facility]

[facility]



     one 25 MG      1-25                               tab, PO,           l



     Oral Tablet      17:17:                               Daily, 0           [facility]

[facility]



     [Aldactone]      00                                 Refill(s)           

 

     Digoxin      2020      Yes                      0.125 mg,           [facility]

[facility]



     0.125 MG      1-25                               PO, Daily,           l



     Oral Tablet      17:13:                               # 30 tab,           [facility]

[facility]



               00                                 0              



                                                  Refill(s)           

 

     Digoxin      2020      Yes                      0.125 mg,           [facility]

[facility]



     0.125 MG      1-25                               PO, Daily,           l



     Oral Tablet      17:13:                               # 30 tab,           [facility]

[facility]



               00                                 0              



                                                  Refill(s)           

 

     Digoxin      -      Yes                      0.125 mg,           [facility]

[facility]



     0.125 MG      1-25                               PO, Daily,           l



     Oral Tablet      17:13:                               # 30 tab,           [facility]

[facility]



               00                                 0              



                                                  Refill(s)           

 

     Digoxin      2020      Yes                      0.125 mg,           [facility]

[facility]



     0.125 MG      1-25                               PO, Daily,           l



     Oral Tablet      17:13:                               # 30 tab,           [facility]

[facility]



               00                                 0              



                                                  Refill(s)           

 

     Digoxin      -      Yes                      0.125 mg,           [facility]

[facility]



     0.125 MG      1-25                               PO, Daily,           l



     Oral Tablet      17:13:                               # 30 tab,           [facility]

[facility]



               00                                 0              



                                                  Refill(s)           

 

     Digoxin      -      Yes                      0.125 mg,           [facility]

[facility]



     0.125 MG      1-25                               PO, Daily,           l



     Oral Tablet      17:13:                               # 30 tab,           [facility]

[facility]



               00                                 0              



                                                  Refill(s)           

 

     Digoxin      -      Yes                      0.125 mg,           [facility]

[facility]



     0.125 MG      1-25                               PO, Daily,           l



     Oral Tablet      17:13:                               # 30 tab,           [facility]

[facility]



               00                                 0              



                                                  Refill(s)           

 

     Digoxin      -      Yes                      0.125 mg,           [facility]

[facility]



     0.125 MG      1-25                               PO, Daily,           l



     Oral Tablet      17:13:                               # 30 tab,           [facility]

[facility]



               00                                 0              



                                                  Refill(s)           

 

     Digoxin      -      Yes                      0.125 mg,           [facility]

[facility]



     0.125 MG      1-25                               PO, Daily,           l



     Oral Tablet      17:13:                               # 30 tab,           [facility]

[facility]



               00                                 0              



                                                  Refill(s)           

 

     Mupirocin      2020      Yes                      See            [facility]



     0.02 MG/MG      0-13                               Instructio           l



     Topical      15:44:                               ns, APPLY           [facility]

[facility]



     Ointment      00                                 EXTERNALLY           



                                                  TO THE           



                                                  AFFECTED           



                                                  AREA TWICE           



                                                  DAILY, #           



                                                  66 gm, 1           



                                                  Refill(s),           



                                                  Pharmacy:           



                                                  [facility]           



                                                  [id number],           



                                                  170.18,           



                                                  cm,            



                                                  20           



                                                  14:42:00           



                                                  CST,           



                                                  Height,           



                                                  164.318,           



                                                  kg,            



                                                  20           



                                                  14:42:00           



                                                  CST,           



                                                  Weight           

 

     Mupirocin      2020-      Yes                      See            [facility]



     0.02 MG/MG      0-13                               Instructio           l



     Topical      15:44:                               ns, APPLY           [facility]

[facility]



     Ointment      00                                 EXTERNALLY           



                                                  TO THE           



                                                  AFFECTED           



                                                  AREA TWICE           



                                                  DAILY, #           



                                                  66 gm, 1           



                                                  Refill(s),           



                                                  Pharmacy:           



                                                  [facility]           



                                                  #[id number],           



                                                  170.18,           



                                                  cm,            



                                                  20           



                                                  14:42:00           



                                                  CST,           



                                                  Height,           



                                                  164.318,           



                                                  kg,            



                                                  20           



                                                  14:42:00           



                                                  CST,           



                                                  Weight           

 

     Mupirocin      2020-      Yes                      See            [facility]



     0.02 MG/MG      0-13                               Instructio           l



     Topical      15:44:                               ns, APPLY           [facility]

[facility]



     Ointment      00                                 EXTERNALLY           



                                                  TO THE           



                                                  AFFECTED           



                                                  AREA TWICE           



                                                  DAILY, #           



                                                  66 gm, 1           



                                                  Refill(s),           



                                                  Pharmacy:           



                                                  [facility]           



                                                  #[id number],           



                                                  170.18,           



                                                  cm,            



                                                  20           



                                                  14:42:00           



                                                  CST,           



                                                  Height,           



                                                  164.318,           



                                                  kg,            



                                                  20           



                                                  14:42:00           



                                                  CST,           



                                                  Weight           

 

     Mupirocin      2020-      Yes                      See            [facility]



     0.02 MG/MG      0-13                               Instructio           l



     Topical      15:44:                               ns, APPLY           [facility]

nn



     Ointment      00                                 EXTERNALLY           



                                                  TO THE           



                                                  AFFECTED           



                                                  AREA TWICE           



                                                  DAILY, #           



                                                  66 gm, 1           



                                                  Refill(s),           



                                                  Pharmacy:           



                                                  [facility]           



                                                  #[id number],           



                                                  170.18,           



                                                  cm,            



                                                  20           



                                                  14:42:00           



                                                  CST,           



                                                  Height,           



                                                  164.318,           



                                                  kg,            



                                                  20           



                                                  14:42:00           



                                                  CST,           



                                                  Weight           

 

     Mupirocin      2020-      Yes                      See            [facility]



     0.02 MG/MG      0-13                               Instructio           l



     Topical      15:44:                               ns, APPLY           [facility]

nn



     Ointment      00                                 EXTERNALLY           



                                                  TO THE           



                                                  AFFECTED           



                                                  AREA TWICE           



                                                  DAILY, #           



                                                  66 gm, 1           



                                                  Refill(s),           



                                                  Pharmacy:           



                                                  [facility]           



                                                  #[id number],           



                                                  170.18,           



                                                  cm,            



                                                  20           



                                                  14:42:00           



                                                  CST,           



                                                  Height,           



                                                  164.318,           



                                                  kg,            



                                                  20           



                                                  14:42:00           



                                                  CST,           



                                                  Weight           

 

     Mupirocin      -      Yes                      See            [facility]



     0.02 MG/MG      0-13                               Instructio           l



     Topical      15:44:                               ns, APPLY           [facility]

[facility]



     Ointment      00                                 EXTERNALLY           



                                                  TO THE           



                                                  AFFECTED           



                                                  AREA TWICE           



                                                  DAILY, #           



                                                  66 gm, 1           



                                                  Refill(s),           



                                                  Pharmacy:           



                                                  [facility]           



                                                  #[id number],           



                                                  170.18,           



                                                  cm,            



                                                  20           



                                                  14:42:00           



                                                  CST,           



                                                  Height,           



                                                  164.318,           



                                                  kg,            



                                                  20           



                                                  14:42:00           



                                                  CST,           



                                                  Weight           

 

     Mupirocin      2020      Yes                      See            [facility]



     0.02 MG/MG      0-13                               Instructio           l



     Topical      15:44:                               ns, APPLY           [facility]

[facility]



     Ointment      00                                 EXTERNALLY           



                                                  TO THE           



                                                  AFFECTED           



                                                  AREA TWICE           



                                                  DAILY, #           



                                                  66 gm, 1           



                                                  Refill(s),           



                                                  Pharmacy:           



                                                  [facility]           



                                                  #[id number],           



                                                  170.18,           



                                                  cm,            



                                                  20           



                                                  14:42:00           



                                                  CST,           



                                                  Height,           



                                                  164.318,           



                                                  kg,            



                                                  20           



                                                  14:42:00           



                                                  CST,           



                                                  Weight           

 

     Mupirocin      2020      Yes                      See            [facility]



     0.02 MG/MG      0-13                               Instructio           l



     Topical      15:44:                               ns, APPLY           [facility]

[facility]



     Ointment      00                                 EXTERNALLY           



                                                  TO THE           



                                                  AFFECTED           



                                                  AREA TWICE           



                                                  DAILY, #           



                                                  66 gm, 1           



                                                  Refill(s),           



                                                  Pharmacy:           



                                                  [facility]           



                                                  #[id number],           



                                                  170.18,           



                                                  cm,            



                                                  20           



                                                  14:42:00           



                                                  CST,           



                                                  Height,           



                                                  164.318,           



                                                  kg,            



                                                  20           



                                                  14:42:00           



                                                  CST,           



                                                  Weight           

 

     Mupirocin      2020      Yes                      See            [facility]



     0.02 MG/MG      0-13                               Instructio           l



     Topical      15:44:                               ns, APPLY           [facility]

[facility]



     Ointment      00                                 EXTERNALLY           



                                                  TO THE           



                                                  AFFECTED           



                                                  AREA TWICE           



                                                  DAILY, #           



                                                  66 gm, 1           



                                                  Refill(s),           



                                                  Pharmacy:           



                                                  [facility]           



                                                  #[id number],           



                                                  170.18,           



                                                  cm,            



                                                  20           



                                                  14:42:00           



                                                  CST,           



                                                  Height,           



                                                  164.318,           



                                                  kg,            



                                                  20           



                                                  14:42:00           



                                                  CST,           



                                                  Weight           

 

     Nitrofurant      -0      Yes                      100 mg = 1           

[facility]



     oin 100 MG      8-09                               cap, PO,           l



     Oral      17:57:                               BID, X 10           [facility]



     Capsule      00                                 day, # 20           



     [Macrobid]                                         cap, 0           



                                                  Refill(s),           



                                                  Pharmacy:           



                                                  [facility]           



                                                  #[id number],           



                                                  170.18,           



                                                  cm,            



                                                  20           



                                                  14:42:00           



                                                  CST,           



                                                  Height,           



                                                  164.318,           



                                                  kg,            



                                                  20           



                                                  14:42:00           



                                                  CST,           



                                                  Weight           

 

     Nitrofurant      2020-0      Yes                      100 mg = 1           

[facility]



     oin 100 MG      8-09                               cap, PO,           l



     Oral      17:57:                               BID, X 10           [facility]



     Capsule      00                                 day, # 20           



     [Macrobid]                                         cap, 0           



                                                  Refill(s),           



                                                  Pharmacy:           



                                                  [facility]           



                                                  #[id number],           



                                                  170.18,           



                                                  cm,            



                                                  20           



                                                  14:42:00           



                                                  CST,           



                                                  Height,           



                                                  164.318,           



                                                  kg,            



                                                  20           



                                                  14:42:00           



                                                  CST,           



                                                  Weight           

 

     Nitrofurant      2020-0      Yes                      100 mg = 1           

[facility]



     oin 100 MG      8-09                               cap, PO,           l



     Oral      17:57:                               BID, X 10           [place]



     Capsule      00                                 day, # 20           



     [Macrobid]                                         cap, 0           



                                                  Refill(s),           



                                                  Pharmacy:           



                                                  [facility]           



                                                  #[id number],           



                                                  170.18,           



                                                  cm,            



                                                  20           



                                                  14:42:00           



                                                  CST,           



                                                  Height,           



                                                  164.318,           



                                                  kg,            



                                                  20           



                                                  14:42:00           



                                                  CST,           



                                                  Weight           

 

     Nitrofurant      2020-0      Yes                      100 mg = 1           

[facility]



     oin 100 MG      8-09                               cap, PO,           l



     Oral      17:57:                               BID, X 10           [facility]



     Capsule      00                                 day, # 20           



     [Macrobid]                                         cap, 0           



                                                  Refill(s),           



                                                  Pharmacy:           



                                                  [facility]           



                                                  #[id number],           



                                                  170.18,           



                                                  cm,            



                                                  20           



                                                  14:42:00           



                                                  CST,           



                                                  Height,           



                                                  164.318,           



                                                  kg,            



                                                  20           



                                                  14:42:00           



                                                  CST,           



                                                  Weight           

 

     Nitrofurant      2020-0      Yes                      100 mg = 1           

[facility]



     oin 100 MG      8-09                               cap, PO,           l



     Oral      17:57:                               BID, X 10           [place]



     Capsule      00                                 day, # 20           



     [Macrobid]                                         cap, 0           



                                                  Refill(s),           



                                                  Pharmacy:           



                                                  [facility]           



                                                  #[id number],           



                                                  170.18,           



                                                  cm,            



                                                  20           



                                                  14:42:00           



                                                  CST,           



                                                  Height,           



                                                  164.318,           



                                                  kg,            



                                                  20           



                                                  14:42:00           



                                                  CST,           



                                                  Weight           

 

     Nitrofurant      2020-0      Yes                      100 mg = 1           

[facility]



     oin 100 MG      8-09                               cap, PO,           l



     Oral      17:57:                               BID, X 10           [place]



     Capsule      00                                 day, # 20           



     [Macrobid]                                         cap, 0           



                                                  Refill(s),           



                                                  Pharmacy:           



                                                  [facility]           



                                                  #[id number],           



                                                  170.18,           



                                                  cm,            



                                                  20           



                                                  14:42:00           



                                                  CST,           



                                                  Height,           



                                                  164.318,           



                                                  kg,            



                                                  20           



                                                  14:42:00           



                                                  CST,           



                                                  Weight           

 

     Nitrofurant      2020-0      Yes                      100 mg = 1           

[facility]



     oin 100 MG      8-09                               cap, PO,           l



     Oral      17:57:                               BID, X 10           [facility]



     Capsule      00                                 day, # 20           



     [Macrobid]                                         cap, 0           



                                                  Refill(s),           



                                                  Pharmacy:           



                                                  [facility]           



                                                  #[id number],           



                                                  170.18,           



                                                  cm,            



                                                  20           



                                                  14:42:00           



                                                  CST,           



                                                  Height,           



                                                  164.318,           



                                                  kg,            



                                                  20           



                                                  14:42:00           



                                                  CST,           



                                                  Weight           

 

     Nitrofurant      2020-0      Yes                      100 mg = 1           

[facility]



     oin 100 MG      8-09                               cap, PO,           l



     Oral      17:57:                               BID, X 10           [facility]



     Capsule      00                                 day, # 20           



     [Macrobid]                                         cap, 0           



                                                  Refill(s),           



                                                  Pharmacy:           



                                                  [facility]           



                                                  #[id number],           



                                                  170.18,           



                                                  cm,            



                                                  20           



                                                  14:42:00           



                                                  CST,           



                                                  Height,           



                                                  164.318,           



                                                  kg,            



                                                  20           



                                                  14:42:00           



                                                  CST,           



                                                  Weight           

 

     Nitrofurant      2020-0      Yes                      100 mg = 1           

[facility]



     oin 100 MG      8-09                               cap, PO,           l



     Oral      17:57:                               BID, X 10           [facility]



     Capsule      00                                 day, # 20           



     [Macrobid]                                         cap, 0           



                                                  Refill(s),           



                                                  Pharmacy:           



                                                  [facility]           



                                                  #[id number],           



                                                  170.18,           



                                                  cm,            



                                                  20           



                                                  14:42:00           



                                                  CST,           



                                                  Height,           



                                                  164.318,           



                                                  kg,            



                                                  20           



                                                  14:42:00           



                                                  CST,           



                                                  Weight           

 

     lisinopril      2020-0      Yes                      2.5 mg = 1           [facility]

[facility]



     2.5 mg oral      6-04                               tab, PO,           l



     tablet      23:26:                               Daily, #           [facility]



               00                                 30 tab, 2           



                                                  Refill(s),           



                                                  called to           



                                                  pharmacy           

 

     lisinopril      2020-0      Yes                      2.5 mg = 1           [facility]

[facility]



     2.5 mg oral      6-04                               tab, PO,           l



     tablet      23:26:                               Daily, #           [facility]



               00                                 30 tab, 2           



                                                  Refill(s),           



                                                  called to           



                                                  pharmacy           

 

     lisinopril      2020-0      Yes                      2.5 mg = 1           [facility]

[facility]



     2.5 mg oral      6-04                               tab, PO,           l



     tablet      23:26:                               Daily, #           [facility]



               00                                 30 tab, 2           



                                                  Refill(s),           



                                                  called to           



                                                  pharmacy           

 

     lisinopril      2020-0      Yes                      2.5 mg = 1           [facility]

[facility]



     2.5 mg oral      6-04                               tab, PO,           l



     tablet      23:26:                               Daily, #           [facility]



               00                                 30 tab, 2           



                                                  Refill(s),           



                                                  called to           



                                                  pharmacy           

 

     lisinopril      2020-0      Yes                      2.5 mg = 1           [facility]

[facility]



     2.5 mg oral      6-04                               tab, PO,           l



     tablet      23:26:                               Daily, #           [facility]



               00                                 30 tab, 2           



                                                  Refill(s),           



                                                  called to           



                                                  pharmacy           

 

     lisinopril      2020-0      Yes                      2.5 mg = 1           [facility]

[facility]



     2.5 mg oral      6-04                               tab, PO,           l



     tablet      23:26:                               Daily, #           [facility]



               00                                 30 tab, 2           



                                                  Refill(s),           



                                                  called to           



                                                  pharmacy           

 

     lisinopril      2020-0      Yes                      2.5 mg = 1           [facility]

[facility]



     2.5 mg oral      6-04                               tab, PO,           l



     tablet      23:26:                               Daily, #           [facility]



               00                                 30 tab, 2           



                                                  Refill(s),           



                                                  called to           



                                                  pharmacy           

 

     lisinopril      2020-0      Yes                      2.5 mg = 1           [facility]

[facility]



     2.5 mg oral      6-04                               tab, PO,           l



     tablet      23:26:                               Daily, #           [facility]



               00                                 30 tab, 2           



                                                  Refill(s),           



                                                  called to           



                                                  pharmacy           

 

     lisinopril      2020-0      Yes                      2.5 mg = 1           [facility]

[facility]



     2.5 mg oral      6-04                               tab, PO,           l



     tablet      23:26:                               Daily, #           [facility]



               00                                 30 tab, 2           



                                                  Refill(s),           



                                                  called to           



                                                  pharmacy           

 

     calcitriol      2020-0      Yes                      = 1 cap,           [facility]

[facility]



     0.25 mcg      5-20                               PO, Daily,           l



     oral      12:18:                               # 90 cap,           [facility]



     capsule      00                                 1              



                                                  Refill(s),           



                                                  Pharmacy:           



                                                  [facility]           



                                                  [facility]           



                                                  #[id number]           

 

     calcitriol      2020-0      Yes                      = 1 cap,           [facility]

[facility]



     0.25 mcg      5-20                               PO, Daily,           l



     oral      12:18:                               # 90 cap,           [facility]



     capsule      00                                 1              



                                                  Refill(s),           



                                                  Pharmacy:           



                                                  [facility]           



                                                  #[id number]           

 

     calcitriol      2020-0      Yes                      = 1 cap,           [facility]

[facility]



     0.25 mcg      5-20                               PO, Daily,           l



     oral      12:18:                               # 90 cap,           [facility]



     capsule      00                                 1              



                                                  Refill(s),           



                                                  Pharmacy:           



                                                  [facility]           



                                                  #[id number]           

 

     calcitriol      2020-0      Yes                      = 1 cap,           [facility]

[facility]



     0.25 mcg      5-20                               PO, Daily,           l



     oral      12:18:                               # 90 cap,           [facility]



     capsule      00                                 1              



                                                  Refill(s),           



                                                  Pharmacy:           



                                                  [facility]           



                                                  #[id number]           

 

     calcitriol      2020-0      Yes                      = 1 cap,           [facility]

[facility]



     0.25 mcg      5-20                               PO, Daily,           l



     oral      12:18:                               # 90 cap,           [facility]



     capsule      00                                 1              



                                                  Refill(s),           



                                                  Pharmacy:           



                                                  [facility]           



                                                  #[id number]           

 

     calcitriol      2020-0      Yes                      = 1 cap,           [facility]

[facility]



     0.25 mcg      5-20                               PO, Daily,           l



     oral      12:18:                               # 90 cap,           [facility]



     capsule      00                                 1              



                                                  Refill(s),           



                                                  Pharmacy:           



                                                  [facility]           



                                                  #[id number]           

 

     calcitriol      2020-0      Yes                      = 1 cap,           [facility]

[facility]



     0.25 mcg      5-20                               PO, Daily,           l



     oral      12:18:                               # 90 cap,           [facility]



     capsule      00                                 1              



                                                  Refill(s),           



                                                  Pharmacy:           



                                                  [facility]           



                                                  #[id number]           

 

     calcitriol      2020-0      Yes                      = 1 cap,           [facility]

[facility]



     0.25 mcg      5-20                               PO, Daily,           l



     oral      12:18:                               # 90 cap,           [facility]



     capsule      00                                 1              



                                                  Refill(s),           



                                                  Pharmacy:           



                                                  [facility]           



                                                  #[id number]           

 

     calcitriol      2020-0      Yes                      = 1 cap,           [facility]

[facility]



     0.25 mcg      5-20                               PO, Daily,           l



     oral      12:18:                               # 90 cap,           [facility]



     capsule      00                                 1              



                                                  Refill(s),           



                                                  Pharmacy:           



                                                  [facility]           



                                                  #[id number]           

 

     calcitriol      2020-0      Yes                      = 1 cap,           [facility]

[facility]



     0.25 mcg      4-16                               PO, Daily,           l



     oral      16:37:                               # 90           [facility]



     capsule      47                                 unknown           



                                                  unit,           



                                                  Pharmacy:           



                                                  [facility]           



                                                  #[id number]           

 

     calcitriol      2020-0      Yes                      = 1 cap,           [facility]

[facility]



     0.25 mcg      4-16                               PO, Daily,           l



     oral      16:37:                               # 90           [facility]



     capsule      47                                 unknown           



                                                  unit,           



                                                  Pharmacy:           



                                                  [facility]           



                                                  [facility]           



                                                  #[id number]           

 

     calcitriol      2020-0      Yes                      = 1 cap,           [facility]

[facility]



     0.25 mcg      4-16                               PO, Daily,           l



     oral      16:37:                               # 90           [facility]



     capsule      47                                 unknown           



                                                  unit,           



                                                  Pharmacy:           



                                                  [facility]           



                                                  [facility]           



                                                  #[id number]           

 

     calcitriol      2020-0      Yes                      = 1 cap,           [facility]

[facility]



     0.25 mcg      4-16                               PO, Daily,           l



     oral      16:37:                               # 90           [facility]



     capsule      47                                 unknown           



                                                  unit,           



                                                  Pharmacy:           



                                                  [facility]           



                                                  [facility]           



                                                  #[id number]           

 

     calcitriol      2020-0      Yes                      = 1 cap,           [facility]

[facility]



     0.25 mcg      4-16                               PO, Daily,           l



     oral      16:37:                               # 90           [facility]



     capsule      47                                 unknown           



                                                  unit,           



                                                  Pharmacy:           



                                                  [facility]           



                                                  [facility]           



                                                  #[id number]           

 

     calcitriol      2020-0      Yes                      = 1 cap,           [facility]

[facility]



     0.25 mcg      4-16                               PO, Daily,           l



     oral      16:37:                               # 90           [facility]



     capsule      47                                 unknown           



                                                  unit,           



                                                  Pharmacy:           



                                                  [facility]           



                                                  [facility]           



                                                  #[id number]           

 

     calcitriol      2020-0      Yes                      = 1 cap,           [facility]

[facility]



     0.25 mcg      4-16                               PO, Daily,           l



     oral      16:37:                               # 90           [facility]



     capsule      47                                 unknown           



                                                  unit,           



                                                  Pharmacy:           



                                                  [facility]           



                                                  [facility]           



                                                  #[id number]           

 

     calcitriol      2020-0      Yes                      = 1 cap,           [facility]

[facility]



     0.25 mcg      4-16                               PO, Daily,           l



     oral      16:37:                               # 90           [facility]



     capsule      47                                 unknown           



                                                  unit,           



                                                  Pharmacy:           



                                                  [facility]           



                                                  [facility]           



                                                  #[id number]           

 

     calcitriol      2020-0      Yes                      = 1 cap,           [facility]

[facility]



     0.25 mcg      4-16                               PO, Daily,           l



     oral      16:37:                               # 90           [facility]



     capsule      47                                 unknown           



                                                  unit,           



                                                  Pharmacy:           



                                                  [facility]           



                                                  [facility]           



                                                  #[id number]           

 

     Furosemide      2020-0      Yes                      = 1 tab,           [facility]

[facility]



     40 MG Oral      4-13                               PO, Every           l



     Tablet      20:06:                               Other Day,           [facility]

[facility]



               19                                 # 45 tab,           



                                                  Pharmacy:           



                                                  [facility]           



                                                  [facility]           



                                                  #[id number]           

 

     Furosemide      2020-0      Yes                      = 1 tab,           [facility]

[facility]



     40 MG Oral      4-13                               PO, Every           l



     Tablet      20:06:                               Other Day,           [facility]

[facility]



               19                                 # 45 tab,           



                                                  Pharmacy:           



                                                  [facility]           



                                                  [facility]           



                                                  #[id number]           

 

     Furosemide      2020-0      Yes                      = 1 tab,           [facility]

[facility]



     40 MG Oral      4-13                               PO, Every           l



     Tablet      20:06:                               Other Day,           [facility]



               19                                 # 45 tab,           



                                                  Pharmacy:           



                                                  [facility]           



                                                  [facility]           



                                                  #[id number]           

 

     Furosemide      2020-0      Yes                      = 1 tab,           [facility]

[facility]



     40 MG Oral      4-13                               PO, Every           l



     Tablet      20:06:                               Other Day,           [facility]



               19                                 # 45 tab,           



                                                  Pharmacy:           



                                                  [facility]           



                                                  [facility]           



                                                  #[id number]           

 

     Furosemide      2020-0      Yes                      = 1 tab,           [facility]

[facility]



     40 MG Oral      4-13                               PO, Every           l



     Tablet      20:06:                               Other Day,           [facility]



               19                                 # 45 tab,           



                                                  Pharmacy:           



                                                  [facility]           



                                                  [facility]           



                                                  #[id number]           

 

     Furosemide      2020-0      Yes                      = 1 tab,           [facility]

[facility]



     40 MG Oral      4-13                               PO, Every           l



     Tablet      20:06:                               Other Day,           [facility]



               19                                 # 45 tab,           



                                                  Pharmacy:           



                                                  [facility]           



                                                  [facility]           



                                                  #[id number]           

 

     Furosemide      2020-0      Yes                      = 1 tab,           [facility]

[facility]



     40 MG Oral      4-13                               PO, Every           l



     Tablet      20:06:                               Other Day,           [facility]



               19                                 # 45 tab,           



                                                  Pharmacy:           



                                                  [facility]           



                                                  [facility]           



                                                  #[id number]           

 

     Furosemide      2020-0      Yes                      = 1 tab,           [facility]

[facility]



     40 MG Oral      4-13                               PO, Every           l



     Tablet      20:06:                               Other Day,           [facility]



               19                                 # 45 tab,           



                                                  Pharmacy:           



                                                  [facility]           



                                                  [facility]           



                                                  #[id number]           

 

     Furosemide      2020-0      Yes                      = 1 tab,           [facility]

[facility]



     40 MG Oral      4-13                               PO, Every           l



     Tablet      20:06:                               Other Day,           [facility]



               19                                 # 45 tab,           



                                                  Pharmacy:           



                                                  [facility]           



                                                  [facility] [place] [facility]           



                                                  #[id number]           

 

     prednisolon      2020-0      Yes                      1 drop in           [facility]

[facility]



     e acetate      4-10                               each eye,           l



     10 MG/ML      20:53:                               once           [facility]



     Ophthalmic      00                                 daily, 0           



     Suspension                                         Refill(s)           

 

     bromfenac      2020-0      Yes                      1 drop in           [facility]

[facility]



     0.7 MG/ML      4-10                               right eye,           l



     Ophthalmic      20:53:                               once           [facility]



     Solution      00                                 daily, 0           



     [Prolensa]                                         Refill(s)           

 

     prednisolon      2020-0      Yes                      1 drop in           [facility]

[facility]



     e acetate      4-10                               each eye,           l



     10 MG/ML      20:53:                               once           [facility]



     Ophthalmic      00                                 daily, 0           



     Suspension                                         Refill(s)           

 

     bromfenac      2020-0      Yes                      1 drop in           [facility]

[facility]



     0.7 MG/ML      4-10                               right eye,           l



     Ophthalmic      20:53:                               once           [facility]



     Solution      00                                 daily, 0           



     [Prolensa]                                         Refill(s)           

 

     prednisolon      2020-0      Yes                      1 drop in           [facility]

[facility]



     e acetate      4-10                               each eye,           l



     10 MG/ML      20:53:                               once           [facility]



     Ophthalmic      00                                 daily, 0           



     Suspension                                         Refill(s)           

 

     bromfenac      2020-0      Yes                      1 drop in           [facility]

[facility]



     0.7 MG/ML      4-10                               right eye,           l



     Ophthalmic      20:53:                               once           [facility]



     Solution      00                                 daily, 0           



     [Prolensa]                                         Refill(s)           

 

     prednisolon      2020-0      Yes                      1 drop in           [facility]

[facility]



     e acetate      4-10                               each eye,           l



     10 MG/ML      20:53:                               once           [facility]



     Ophthalmic      00                                 daily, 0           



     Suspension                                         Refill(s)           

 

     bromfenac      2020-0      Yes                      1 drop in           [facility]

[facility]



     0.7 MG/ML      4-10                               right eye,           l



     Ophthalmic      20:53:                               once           [facility]



     Solution      00                                 daily, 0           



     [Prolensa]                                         Refill(s)           

 

     prednisolon      2020-0      Yes                      1 drop in           [facility]

[facility]



     e acetate      4-10                               each eye,           l



     10 MG/ML      20:53:                               once           [facility]



     Ophthalmic      00                                 daily, 0           



     Suspension                                         Refill(s)           

 

     bromfenac      2020-0      Yes                      1 drop in           [facility]

[facility]



     0.7 MG/ML      4-10                               right eye,           l



     Ophthalmic      20:53:                               once           [facility]



     Solution      00                                 daily, 0           



     [Prolensa]                                         Refill(s)           

 

     prednisolon      2020-0      Yes                      1 drop in           [facility]

[facility]



     e acetate      4-10                               each eye,           l



     10 MG/ML      20:53:                               once           [facility]



     Ophthalmic      00                                 daily, 0           



     Suspension                                         Refill(s)           

 

     bromfenac      2020-0      Yes                      1 drop in           [facility]

[facility]



     0.7 MG/ML      4-10                               right eye,           l



     Ophthalmic      20:53:                               once           [facility]



     Solution      00                                 daily, 0           



     [Prolensa]                                         Refill(s)           

 

     prednisolon      2020-0      Yes                      1 drop in           [facility]

[facility]



     e acetate      4-10                               each eye,           l



     10 MG/ML      20:53:                               once           [facility]



     Ophthalmic      00                                 daily, 0           



     Suspension                                         Refill(s)           

 

     bromfenac      2020-0      Yes                      1 drop in           [facility]

[facility]



     0.7 MG/ML      4-10                               right eye,           l



     Ophthalmic      20:53:                               once           [facility]



     Solution      00                                 daily, 0           



     [Prolensa]                                         Refill(s)           

 

     prednisolon      2020-0      Yes                      1 drop in           [facility]

[facility]



     e acetate      4-10                               each eye,           l



     10 MG/ML      20:53:                               once           [facility]



     Ophthalmic      00                                 daily, 0           



     Suspension                                         Refill(s)           

 

     bromfenac      2020-0      Yes                      1 drop in           [facility]

[facility]



     0.7 MG/ML      4-10                               right eye,           l



     Ophthalmic      20:53:                               once           [facility]



     Solution      00                                 daily, 0           



     [Prolensa]                                         Refill(s)           

 

     prednisolon      2020-0      Yes                      1 drop in           [facility]

[facility]



     e acetate      4-10                               each eye,           l



     10 MG/ML      20:53:                               once           [facility]



     Ophthalmic      00                                 daily, 0           



     Suspension                                         Refill(s)           

 

     bromfenac      2020-0      Yes                      1 drop in           [facility]

[facility]



     0.7 MG/ML      4-10                               right eye,           l



     Ophthalmic      20:53:                               once           [facility]



     Solution      00                                 daily, 0           



     [Prolensa]                                         Refill(s)           

 

     Furosemide      2020-0      Yes                      = 1 tab,           [facility]

[facility]



     40 MG Oral      1-23                               PO, Every           l



     Tablet      15:13:                               Other Day,           [facility]



               34                                 # 45 tab,           



                                                  [facility]           



                                                  [facility]           



                                                  [facility] [place] [facility]           



                                                  #[id number]           

 

     Furosemide      2020-0      Yes                      = 1 tab,           [facility]

[facility]



     40 MG Oral      1-23                               PO, Every           l



     Tablet      15:13:                               Other Day,           [facility]



               34                                 # 45 tab,           



                                                  Pharmacy:           



                                                  [facility]           



                                                  [facility] [place] [facility]           



                                                  #[id number]           

 

     Furosemide      2020-0      Yes                      = 1 tab,           [facility]

[facility]



     40 MG Oral      1-23                               PO, Every           l



     Tablet      15:13:                               Other Day,           [facility]



               34                                 # 45 tab,           



                                                  [facility]           



                                                  [facility]           



                                                  [facility] [place]           



                                                  #[id number]           

 

     Furosemide      2020-0      Yes                      = 1 tab,           [facility]

[facility]



     40 MG Oral      1-23                               PO, Every           l



     Tablet      15:13:                               Other Day,           [facility]



               34                                 # 45 tab,           



                                                  [facility]           



                                                  [facility]           



                                                  [facility] [place]           



                                                  #[id number]           

 

     Furosemide      2020-0      Yes                      = 1 tab,           [facility]

[facility]



     40 MG Oral      1-23                               PO, Every           l



     Tablet      15:13:                               Other Day,           [facility]

[facility]



               34                                 # 45 tab,           



                                                  Pharmacy:           



                                                  [facility]           



                                                  [facility] [place]           



                                                  #[id number]           

 

     Furosemide      2020-0      Yes                      = 1 tab,           [facility]

[facility]



     40 MG Oral      1-23                               PO, Every           l



     Tablet      15:13:                               Other Day,           [facility]

[facility]



               34                                 # 45 tab,           



                                                  Pharmacy:           



                                                  [facility]           



                                                  [facility] [place]           



                                                  #[id number]           

 

     Furosemide      2020-0      Yes                      = 1 tab,           [facility]

[facility]



     40 MG Oral      1-23                               PO, Every           l



     Tablet      15:13:                               Other Day,           [facility]



               34                                 # 45 tab,           



                                                  Pharmacy:           



                                                  [facility]           



                                                  [facility] [place]           



                                                  #[id number]           

 

     Furosemide      2020-0      Yes                      = 1 tab,           [facility]

[facility]



     40 MG Oral      1-23                               PO, Every           l



     Tablet      15:13:                               Other Day,           [facility]

[facility]



               34                                 # 45 tab,           



                                                  Pharmacy:           



                                                  [facility]           



                                                  #[id number]           

 

     Furosemide            Yes                      = 1 tab,           [facility]

[facility]



     40 MG Oral      1-23                               PO, Every           l



     Tablet      15:13:                               Other Day,           [facility]

[facility]



               34                                 # 45 tab,           



                                                  Pharmacy:           



                                                  [facility]           



                                                  #[id number]           

 

     Mupirocin      2019      Yes                      See            [facility]



     0.02 MG/MG      2-27                               Instructio           l



     Topical      19:11:                               ns, # 66           [facility]

n



     Ointment      15                                 gm, APPLY           



                                                  EXTERNALLY           



                                                  TO THE           



                                                  AFFECTED           



                                                  AREA TWICE           



                                                  DAILY,           



                                                  Pharmacy:           



                                                  [facility]           



                                                  [facility] [place]           



                                                  #[id number]           

 

     Mupirocin      2019      Yes                      See            [facility]



     0.02 MG/MG      2-27                               Instructio           l



     Topical      19:11:                               ns, # 66           [facility]

n



     Ointment      15                                 gm, APPLY           



                                                  EXTERNALLY           



                                                  TO THE           



                                                  AFFECTED           



                                                  AREA TWICE           



                                                  DAILY,           



                                                  Pharmacy:           



                                                  [facility]           



                                                  [facility] [place]           



                                                  #[id number]           

 

     Mupirocin      2019      Yes                      See            [facility]



     0.02 MG/MG      2-27                               Instructio           l



     Topical      19:11:                               ns, # 66           [facility]

n



     Ointment      15                                 gm, APPLY           



                                                  EXTERNALLY           



                                                  TO THE           



                                                  AFFECTED           



                                                  AREA TWICE           



                                                  DAILY,           



                                                  Pharmacy:           



                                                  [facility]           



                                                  [facility] [place]           



                                                  #[id number]           

 

     Mupirocin      2019      Yes                      See            [facility]



     0.02 MG/MG      2-27                               Instructio           l



     Topical      19:11:                               ns, # 66           [facility]

n



     Ointment      15                                 gm, APPLY           



                                                  EXTERNALLY           



                                                  TO THE           



                                                  AFFECTED           



                                                  AREA TWICE           



                                                  DAILY,           



                                                  Pharmacy:           



                                                  [facility]           



                                                  [facility] [place]           



                                                  #[id number]           

 

     Mupirocin      2019      Yes                      See            [facility]



     0.02 MG/MG      2-27                               Instructio           l



     Topical      19:11:                               ns, # 66           [facility]

n



     Ointment      15                                 gm, APPLY           



                                                  EXTERNALLY           



                                                  TO THE           



                                                  AFFECTED           



                                                  AREA TWICE           



                                                  DAILY,           



                                                  Pharmacy:           



                                                  [facility]           



                                                  [facility] [place]           



                                                  #[id number]           

 

     Mupirocin      2019      Yes                      See            [facility]



     0.02 MG/MG      2-27                               Instructio           l



     Topical      19:11:                               ns, # 66           [facility]

n



     Ointment      15                                 gm, APPLY           



                                                  EXTERNALLY           



                                                  TO THE           



                                                  AFFECTED           



                                                  AREA TWICE           



                                                  DAILY,           



                                                  Pharmacy:           



                                                  [facility]           



                                                  [facility] [place]           



                                                  #[id number]           

 

     Mupirocin      2019      Yes                      See            [facility]



     0.02 MG/MG      2-27                               Instructio           l



     Topical      19:11:                               ns, # 66           [facility]

n



     Ointment      15                                 gm, APPLY           



                                                  EXTERNALLY           



                                                  TO THE           



                                                  AFFECTED           



                                                  AREA TWICE           



                                                  DAILY,           



                                                  Pharmacy:           



                                                  [facility]           



                                                  DRUG STORE           



                                                  #[id number]           

 

     Mupirocin      2019      Yes                      See            [facility]



     0.02 MG/MG      2-27                               Instructio           l



     Topical      19:11:                               ns, # 66           [facility]

n



     Ointment      15                                 gm, APPLY           



                                                  EXTERNALLY           



                                                  TO THE           



                                                  AFFECTED           



                                                  AREA TWICE           



                                                  DAILY,           



                                                  Pharmacy:           



                                                  [facility]           



                                                  DRUG STORE           



                                                  #[id number]           

 

     Mupirocin      2019      Yes                      See            [facility]



     0.02 MG/MG      2-27                               Instructio           l



     Topical      19:11:                               ns, # 66           [facility]

n



     Ointment      15                                 gm, APPLY           



                                                  EXTERNALLY           



                                                  TO THE           



                                                  AFFECTED           



                                                  AREA TWICE           



                                                  DAILY,           



                                                  Pharmacy:           



                                                  [facility]           



                                                  [facility]           



                                                  #[id number]           

 

     Nitrofurant      2019      Yes                      100 mg = 1           

[facility]



     oin 100 MG      2-13                               cap, PO,           l



     Oral      01:44:                               BID, X 5           [facility]



     Capsule      00                                 day, # 10           



     [Macrodanti                                         cap, 0           



     n]                                           Refill(s),           



                                                  Pharmacy:           



                                                  [facility]           



                                                  [facility] [place]           



                                                  #[id number]           

 

     Nitrofurant      2019      Yes                      100 mg = 1           

[facility]



     oin 100 MG      2-13                               cap, PO,           l



     Oral      01:44:                               BID, X 5           [facility]



     Capsule      00                                 day, # 10           



     [Macrodanti                                         cap, 0           



     n]                                           Refill(s),           



                                                  Pharmacy:           



                                                  [facility]           



                                                  [facility] [place]           



                                                  #[id number]           

 

     Nitrofurant      2019      Yes                      100 mg = 1           

[facility]



     oin 100 MG      2-13                               cap, PO,           l



     Oral      01:44:                               BID, X 5           [facility]



     Capsule      00                                 day, # 10           



     [Macrodanti                                         cap, 0           



     n]                                           Refill(s),           



                                                  Pharmacy:           



                                                  [facility]           



                                                  [facility]           



                                                  #[id number]           

 

     Nitrofurant      2019      Yes                      100 mg = 1           

[facility]



     oin 100 MG      2-13                               cap, PO,           l



     Oral      01:44:                               BID, X 5           [facility]



     Capsule      00                                 day, # 10           



     [Macrodanti                                         cap, 0           



     n]                                           Refill(s),           



                                                  Pharmacy:           



                                                  [facility]           



                                                  [facility]           



                                                  #[id number]           

 

     Nitrofurant      2019      Yes                      100 mg = 1           

[facility]



     oin 100 MG      2-13                               cap, PO,           l



     Oral      01:44:                               BID, X 5           [facility]



     Capsule      00                                 day, # 10           



     [Macrodanti                                         cap, 0           



     n]                                           Refill(s),           



                                                  Pharmacy:           



                                                  [facility]           



                                                  [facility]           



                                                  #[id number]           

 

     Nitrofurant      2019-1      Yes                      100 mg = 1           

[facility]



     oin 100 MG      2-13                               cap, PO,           l



     Oral      01:44:                               BID, X 5           [facility]



     Capsule      00                                 day, # 10           



     [Macrodanti                                         cap, 0           



     n]                                           Refill(s),           



                                                  Pharmacy:           



                                                  [facility]           



                                                  [facility]           



                                                  #[id number]           

 

     Nitrofurant      2019      Yes                      100 mg = 1           

[facility]



     oin 100 MG      2-13                               cap, PO,           l



     Oral      01:44:                               BID, X 5           [facility]



     Capsule      00                                 day, # 10           



     [Macrodanti                                         cap, 0           



     n]                                           Refill(s),           



                                                  Pharmacy:           



                                                  [facility]           



                                                  [facility]           



                                                  #[id number]           

 

     Nitrofurant      2019      Yes                      100 mg = 1           

[facility]



     oin 100 MG      2-13                               cap, PO,           l



     Oral      01:44:                               BID, X 5           [facility]



     Capsule      00                                 day, # 10           



     [Macrodanti                                         cap, 0           



     n]                                           Refill(s),           



                                                  Pharmacy:           



                                                  [facility]           



                                                  [facility] STORE           



                                                  #[id number]           

 

     Nitrofurant      2019      Yes                      100 mg = 1           

[facility]



     oin 100 MG      2-13                               cap, PO,           l



     Oral      01:44:                               BID, X 5           [facility]



     Capsule      00                                 day, # 10           



     [Macrodanti                                         cap, 0           



     n]                                           Refill(s),           



                                                  Pharmacy:           



                                                  [facility]           



                                                  [facility]           



                                                  #[id number]           

 

     apixaban 5      2019      Yes                      5 mg, PO,           [facility]

[facility]



     MG Oral      0-25                               Q12H, 0           l



     Tablet      15:07:                               Refill(s)           Don[facility]

n



     [Eliquis]      00                                                

 

     metoprolol      2019      Yes                      50 mg = 1           [facility]

[facility]



     tartrate 50      0-25                               tab, PO,           l



     mg oral      15:07:                               BID, # 180           [facility]

[facility]



     tablet      00                                 tab, 0           



                                                  Refill(s)           

 

     Diltiazem      2019      Yes                      180 mg = 1           [facility]

[facility]



     Hydrochlori      0-25                               cap, PO,           l



     de       15:07:                               Daily, #           [facility]

[facility]



     mg/24 hours      00                                 30 cap, 0           



     oral                                         Refill(s)           



     capsule,                                                        



     extended                                                        



     release                                                        

 

     tramadol      2019      Yes                      150 mg = 1           [facility]

[facility]



     150 mg/24      0-25                               cap, PO,           l



     hours oral      15:07:                               Daily, 0           [facility]

[facility]



     capsule,      00                                 Refill(s)           



     extended                                                        



     release                                                        

 

     Acetaminoph      2019      Yes                      1 tab, PO,           

[facility]



     en 325 MG /      0-25                               Q6H, 0           l



     Hydrocodone      15:07:                               Refill(s)           [facility]

[facility]



     Bitartrate      00                                                



     5 MG Oral                                                        



     Tablet                                                        



     [Norco                                                        



     5/325]                                                        

 

     apixaban 5      2019      Yes                      5 mg, PO,           [facility]

[facility]



     MG Oral      0-25                               Q12H, 0           l



     Tablet      15:07:                               Refill(s)           Don[facility] martinez



     [Eliquis]      00                                                

 

     metoprolol      2019      Yes                      50 mg = 1           [facility]

[facility]



     tartrate 50      0-25                               tab, PO,           l



     mg oral      15:07:                               BID, # 180           [facility]

[facility]



     tablet      00                                 tab, 0           



                                                  Refill(s)           

 

     Diltiazem      2019      Yes                      180 mg = 1           [facility]

[facility]



     Hydrochlori      0-25                               cap, PO,           l



     de       15:07:                               Daily, #           [facility]

[facility]



     mg/24 hours      00                                 30 cap, 0           



     oral                                         Refill(s)           



     capsule,                                                        



     extended                                                        



     release                                                        

 

     tramadol      2019      Yes                      150 mg = 1           [facility]

[facility]



     150 mg/24      0-25                               cap, PO,           l



     hours oral      15:07:                               Daily, 0           [facility]

[facility]



     capsule,      00                                 Refill(s)           



     extended                                                        



     release                                                        

 

     Acetaminoph      2019      Yes                      1 tab, PO,           

[facility]



     en 325 MG /      0-25                               Q6H, 0           l



     Hydrocodone      15:07:                               Refill(s)           [facility]

[facility]



     Bitartrate      00                                                



     5 MG Oral                                                        



     Tablet                                                        



     [Norco                                                        



     5/325]                                                        

 

     apixaban 5      2019      Yes                      5 mg, PO,           [facility]

[facility]



     MG Oral      0-25                               Q12H, 0           l



     Tablet      15:07:                               Refill(s)           Donny martinez



     [Eliquis]      00                                                

 

     metoprolol      2019      Yes                      50 mg = 1           [facility]

[facility]



     tartrate 50      0-25                               tab, PO,           l



     mg oral      15:07:                               BID, # 180           [facility]

[facility]



     tablet      00                                 tab, 0           



                                                  Refill(s)           

 

     Diltiazem      2019      Yes                      180 mg = 1           [facility]

[facility]



     Hydrochlori      0-25                               cap, PO,           l



     de       15:07:                               Daily, #           [facility]

[facility]



     mg/24 hours      00                                 30 cap, 0           



     oral                                         Refill(s)           



     capsule,                                                        



     extended                                                        



     release                                                        

 

     tramadol      2019      Yes                      150 mg = 1           [facility]

[facility]



     150 mg/24      0-25                               cap, PO,           l



     hours oral      15:07:                               Daily, 0           [facility]

[facility]



     capsule,      00                                 Refill(s)           



     extended                                                        



     release                                                        

 

     Acetaminoph      2019      Yes                      1 tab, PO,           

[facility]



     en 325 MG /      0-25                               Q6H, 0           l



     Hydrocodone      15:07:                               Refill(s)           [facility]

[facility]



     Bitartrate      00                                                



     5 MG Oral                                                        



     Tablet                                                        



     [Norco                                                        



     5/325]                                                        

 

     apixaban 5      2019      Yes                      5 mg, PO,           [facility]

[facility]



     MG Oral      0-25                               Q12H, 0           l



     Tablet      15:07:                               Refill(s)           Donny martinez



     [Eliquis]      00                                                

 

     metoprolol      2019      Yes                      50 mg = 1           [facility]

[facility]



     tartrate 50      0-25                               tab, PO,           l



     mg oral      15:07:                               BID, # 180           [facility]

[facility]



     tablet      00                                 tab, 0           



                                                  Refill(s)           

 

     Diltiazem      2019      Yes                      180 mg = 1           [facility]

[facility]



     Hydrochlori      0-25                               cap, PO,           l



     de       15:07:                               Daily, #           [facility]

[facility]



     mg/24 hours      00                                 30 cap, 0           



     oral                                         Refill(s)           



     capsule,                                                        



     extended                                                        



     release                                                        

 

     tramadol      2019      Yes                      150 mg = 1           [facility]

[facility]



     150 mg/24      0-25                               cap, PO,           l



     hours oral      15:07:                               Daily, 0           [facility]

[facility]



     capsule,      00                                 Refill(s)           



     extended                                                        



     release                                                        

 

     Acetaminoph      2019      Yes                      1 tab, PO,           

[facility]



     en 325 MG /      0-25                               Q6H, 0           l



     Hydrocodone      15:07:                               Refill(s)           [facility]

[facility]



     Bitartrate      00                                                



     5 MG Oral                                                        



     Tablet                                                        



     [Norco                                                        



     5/325]                                                        

 

     apixaban 5      2019      Yes                      5 mg, PO,           [facility]

[facility]



     MG Oral      0-25                               Q12H, 0           l



     Tablet      15:07:                               Refill(s)           Dnony martinez



     [Eliquis]      00                                                

 

     metoprolol      2019      Yes                      50 mg = 1           [facility]

[facility]



     tartrate 50      0-25                               tab, PO,           l



     mg oral      15:07:                               BID, # 180           [facility]

[facility]



     tablet      00                                 tab, 0           



                                                  Refill(s)           

 

     Diltiazem      2019      Yes                      180 mg = 1           [facility]

[facility]



     Hydrochlori      0-25                               cap, PO,           l



     de       15:07:                               Daily, #           [facility]

[facility]



     mg/24 hours      00                                 30 cap, 0           



     oral                                         Refill(s)           



     capsule,                                                        



     extended                                                        



     release                                                        

 

     tramadol      2019      Yes                      150 mg = 1           [facility]

[facility]



     150 mg/24      0-25                               cap, PO,           l



     hours oral      15:07:                               Daily, 0           [facility]

[facility]



     capsule,      00                                 Refill(s)           



     extended                                                        



     release                                                        

 

     Acetaminoph      2019      Yes                      1 tab, PO,           

[facility]



     en 325 MG /      0-25                               Q6H, 0           l



     Hydrocodone      15:07:                               Refill(s)           [facility]

[facility]



     Bitartrate      00                                                



     5 MG Oral                                                        



     Tablet                                                        



     [Norco                                                        



     5/325]                                                        

 

     apixaban 5      2019      Yes                      5 mg, PO,           [facility]

[facility]



     MG Oral      0-25                               Q12H, 0           l



     Tablet      15:07:                               Refill(s)           Don[facility]



     [Eliquis]      00                                                

 

     metoprolol      2019      Yes                      50 mg = 1           [facility]

[facility]



     tartrate 50      0-25                               tab, PO,           l



     mg oral      15:07:                               BID, # 180           [facility]

[facility]



     tablet      00                                 tab, 0           



                                                  Refill(s)           

 

     Diltiazem      2019      Yes                      180 mg = 1           [facility]

[facility]



     Hydrochlori      0-25                               cap, PO,           l



     de       15:07:                               Daily, #           [facility]

[facility]



     mg/24 hours      00                                 30 cap, 0           



     oral                                         Refill(s)           



     capsule,                                                        



     extended                                                        



     release                                                        

 

     tramadol      2019      Yes                      150 mg = 1           [facility]

[facility]



     150 mg/24      0-25                               cap, PO,           l



     hours oral      15:07:                               Daily, 0           [facility]

[facility]



     capsule,      00                                 Refill(s)           



     extended                                                        



     release                                                        

 

     Acetaminoph      2019      Yes                      1 tab, PO,           

[facility]



     en 325 MG /      0-25                               Q6H, 0           l



     Hydrocodone      15:07:                               Refill(s)           [facility]

[facility]



     Bitartrate      00                                                



     5 MG Oral                                                        



     Tablet                                                        



     [Norco                                                        



     5/325]                                                        

 

     apixaban 5      2019      Yes                      5 mg, PO,           [facility]

[facility]



     MG Oral      0-25                               Q12H, 0           l



     Tablet      15:07:                               Refill(s)           Don[facility]



     [Eliquis]      00                                                

 

     metoprolol      2019      Yes                      50 mg = 1           [facility]

[facility]



     tartrate 50      0-25                               tab, PO,           l



     mg oral      15:07:                               BID, # 180           [facility]

[facility]



     tablet      00                                 tab, 0           



                                                  Refill(s)           

 

     Diltiazem      2019      Yes                      180 mg = 1           [facility]

[facility]



     Hydrochlori      0-25                               cap, PO,           l



     de       15:07:                               Daily, #           [facility]

[facility]



     mg/24 hours      00                                 30 cap, 0           



     oral                                         Refill(s)           



     capsule,                                                        



     extended                                                        



     release                                                        

 

     tramadol      2019      Yes                      150 mg = 1           [facility]

[facility]



     150 mg/24      0-25                               cap, PO,           l



     hours oral      15:07:                               Daily, 0           [facility]

[facility]



     capsule,      00                                 Refill(s)           



     extended                                                        



     release                                                        

 

     Acetaminoph      2019      Yes                      1 tab, PO,           

[facility]



     en 325 MG /      0-25                               Q6H, 0           l



     Hydrocodone      15:07:                               Refill(s)           [facility]

[facility]



     Bitartrate      00                                                



     5 MG Oral                                                        



     Tablet                                                        



     [Norco                                                        



     5/325]                                                        

 

     apixaban 5      2019      Yes                      5 mg, PO,           [facility]

[facility]



     MG Oral      0-25                               Q12H, 0           l



     Tablet      15:07:                               Refill(s)           Don[facility]



     [Eliquis]      00                                                

 

     metoprolol      2019      Yes                      50 mg = 1           [facility]

[facility]



     tartrate 50      0-25                               tab, PO,           l



     mg oral      15:07:                               BID, # 180           [facility]

[facility]



     tablet      00                                 tab, 0           



                                                  Refill(s)           

 

     Diltiazem      2019      Yes                      180 mg = 1           [facility]

[facility]



     Hydrochlori      0-25                               cap, PO,           l



     de       15:07:                               Daily, #           [facility]

daryl



     mg/24 hours      00                                 30 cap, 0           



     oral                                         Refill(s)           



     capsule,                                                        



     extended                                                        



     release                                                        

 

     tramadol      2019      Yes                      150 mg = 1           [facility]

[facility]



     150 mg/24      0-25                               cap, PO,           l



     hours oral      15:07:                               Daily, 0           Her

hernandes



     capsule,      00                                 Refill(s)           



     extended                                                        



     release                                                        

 

     Acetaminoph      2019      Yes                      1 tab, PO,           

[facility]



     en 325 MG /      0-25                               Q6H, 0           l



     Hydrocodone      15:07:                               Refill(s)           H

ermann



     Bitartrate      00                                                



     5 MG Oral                                                        



     Tablet                                                        



     [Norco                                                        



     5/325]                                                        

 

     apixaban 5      2019      Yes                      5 mg, PO,           Me

moria



     MG Oral      0-25                               Q12H, 0           l



     Tablet      15:07:                               Refill(s)           Donny martinez



     [Eliquis]      00                                                

 

     metoprolol      2019      Yes                      50 mg = 1           Me

[facility]



     tartrate 50      0-25                               tab, PO,           l



     mg oral      15:07:                               BID, # 180           [facility]

daryl



     tablet      00                                 tab, 0           



                                                  Refill(s)           

 

     Diltiazem      2019      Yes                      180 mg = 1           Me

[facility]ia



     Hydrochlori      0-25                               cap, PO,           l



     de       15:07:                               Daily, #           [facility]

daryl



     mg/24 hours      00                                 30 cap, 0           



     oral                                         Refill(s)           



     capsule,                                                        



     extended                                                        



     release                                                        

 

     tramadol      2019      Yes                      150 mg = 1           [facility]

[facility]



     150 mg/24      0-25                               cap, PO,           l



     hours oral      15:07:                               Daily, 0           Her

hernandes



     capsule,      00                                 Refill(s)           



     extended                                                        



     release                                                        

 

     Acetaminoph      2019      Yes                      1 tab, PO,           

[facility]



     en 325 MG /      0-25                               Q6H, 0           l



     Hydrocodone      15:07:                               Refill(s)           H

ermann



     Bitartrate      00                                                



     5 MG Oral                                                        



     Tablet                                                        



     [Norco                                                        



     5/325]                                                        

 

     calcitriol      2019      Yes                      = 1 cap,           [facility]

[facility]



     0.25 mcg      0-11                               PO, Daily,           l



     oral      21:10:                               # 90           [place]



     capsule      30                                 unknown           



                                                  unit,           



                                                  Pharmacy:           



                                                  [facility]           



                                                  [facility] [place] [facility]           



                                                  #[id number]           

 

     calcitriol      2019      Yes                      = 1 cap,           [facility]

[facility]



     0.25 mcg      0-11                               PO, Daily,           l



     oral      21:10:                               # 90           [place]



     capsule      30                                 unknown           



                                                  unit,           



                                                  Pharmacy:           



                                                  [facility]           



                                                  [facility] [place] [facility]           



                                                  #[id number]           

 

     calcitriol      2019      Yes                      = 1 cap,           [facility]

[facility]



     0.25 mcg      0-11                               PO, Daily,           l



     oral      21:10:                               # 90           [facility]



     capsule      30                                 unknown           



                                                  unit,           



                                                  Pharmacy:           



                                                  [facility]           



                                                  [facility] [place]           



                                                  #[id number]           

 

     calcitriol      2019      Yes                      = 1 cap,           [facility]

[facility]



     0.25 mcg      0-11                               PO, Daily,           l



     oral      21:10:                               # 90           [place]



     capsule      30                                 unknown           



                                                  unit,           



                                                  Pharmacy[facility]           



                                                  [facility] [place]           



                                                  #[id number]           

 

     calcitriol      2019      Yes                      = 1 cap,           [facility]

[facility]



     0.25 mcg      0-11                               PO, Daily,           l



     oral      21:10:                               # 90           [facility]



     capsule      30                                 unknown           



                                                  unit,           



                                                  Pharmacy:           



                                                  [facility] [place]           



                                                  #[id number]           

 

     calcitriol      2019      Yes                      = 1 cap,           [facility]

[facility]



     0.25 mcg      0-11                               PO, Daily,           l



     oral      21:10:                               # 90           [facility] [place]



     capsule      30                                 unknown           



                                                  unit,           



                                                  Pharmacy:           



                                                  [facility]           



                                                  [facility] [place]           



                                                  #[id number]           

 

     calcitriol      2019      Yes                      = 1 cap,           [facility]

[facility]



     0.25 mcg      0-11                               PO, Daily,           l



     oral      21:10:                               # 90           [facility]



     capsule      30                                 unknown           



                                                  unit,           



                                                  Pharmacy:           



                                                  [facility] [place]           



                                                  #[id number]           

 

     calcitriol      2019      Yes                      = 1 cap,           [facility]

[facility]



     0.25 mcg      0-11                               PO, Daily,           l



     oral      21:10:                               # 90           [facility]



     capsule      30                                 unknown           



                                                  unit,           



                                                  Pharmacy:           



                                                  [facility]           



                                                  #[id number]           

 

     calcitriol      2019      Yes                      = 1 cap,           [facility]

[facility]



     0.25 mcg      0-11                               PO, Daily,           l



     oral      21:10:                               # 90           [facility]



     capsule      30                                 unknown           



                                                  unit,           



                                                  Pharmacy:           



                                                  [facility]           



                                                  #[id number]           

 

     gabapentin      -      Yes                      300 mg = 1           M

[facility]oria



     300 MG Oral      9-27                               cap, PO,           l



     Capsule      20:54:                               BID, # 180           [facility]

daryl



               00                                 cap, 3           



                                                  Refill(s),           



                                                  called to           



                                                  pharmacy           

 

     gabapentin      2019-0      Yes                      300 mg = 1           M

[facility]



     300 MG Oral      9-27                               cap, PO,           l



     Capsule      20:54:                               BID, # 180           [facility]

daryl



               00                                 cap, 3           



                                                  Refill(s),           



                                                  called to           



                                                  pharmacy           

 

     gabapentin      2019-0      Yes                      300 mg = 1           M

[facility]



     300 MG Oral      9-27                               cap, PO,           l



     Capsule      20:54:                               BID, # 180           [facility]

daryl



               00                                 cap, 3           



                                                  Refill(s),           



                                                  called to           



                                                  pharmacy           

 

     gabapentin      2019-0      Yes                      300 mg = 1           M

[facility]



     300 MG Oral      9-27                               cap, PO,           l



     Capsule      20:54:                               BID, # 180           [facility]

daryl



               00                                 cap, 3           



                                                  Refill(s),           



                                                  called to           



                                                  pharmacy           

 

     gabapentin      2019-0      Yes                      300 mg = 1           M

[facility]



     300 MG Oral      9-27                               cap, PO,           l



     Capsule      20:54:                               BID, # 180           [facility]

daryl



               00                                 cap, 3           



                                                  Refill(s),           



                                                  called to           



                                                  pharmacy           

 

     gabapentin      2019-0      Yes                      300 mg = 1           M

[facility]



     300 MG Oral      9-27                               cap, PO,           l



     Capsule      20:54:                               BID, # 180           [facility]

daryl



               00                                 cap, 3           



                                                  Refill(s),           



                                                  called to           



                                                  pharmacy           

 

     gabapentin      2019-0      Yes                      300 mg = 1           M

[facility]



     300 MG Oral      9-27                               cap, PO,           l



     Capsule      20:54:                               BID, # 180           [facility]

daryl



               00                                 cap, 3           



                                                  Refill(s),           



                                                  called to           



                                                  pharmacy           

 

     gabapentin      2019-0      Yes                      300 mg = 1           M

[facility]



     300 MG Oral      9-27                               cap, PO,           l



     Capsule      20:54:                               BID, # 180           [facility]

daryl



               00                                 cap, 3           



                                                  Refill(s),           



                                                  called to           



                                                  pharmacy           

 

     gabapentin      2019-0      Yes                      300 mg = 1           M

[facility]



     300 MG Oral      9-27                               cap, PO,           l



     Capsule      20:54:                               BID, # 180           [facility]

daryl



               00                                 cap, 3           



                                                  Refill(s),           



                                                  called to           



                                                  pharmacy           

 

     allopurinol      2019-0      Yes                      = 1 tab,           Me

[facility]



     300 mg oral      8-17                               PO, Daily,           l



     tablet      02:39:                               # 90 Donny rothman

n



               31                                 Pharmacy:           



                                                  [facility]           



                                                  [facility]           



                                                  #[id number]           

 

     allopurinol      2019-0      Yes                      = 1 tab,           Me

moria



     300 mg oral      8-17                               PO, Daily,           l



     tablet      02:39:                               # 90 tab,           Donny

n



               31                                 Pharmacy:           



                                                  [facility]           



                                                  #[id number]           

 

     allopurinol      2019-0      Yes                      = 1 tab,           Me

moria



     300 mg oral      8-17                               PO, Daily,           l



     tablet      02:39:                               # 90 tab,           [place] [id number]                                 Pharmacy:           



                                                  [facility]           



                                                  #[id number]           

 

     allopurinol      2019-0      Yes                      = 1 tab,           Me

moria



     300 mg oral      8-17                               PO, Daily,           l



     tablet      02:39:                               # 90 tab,           [place] [id number]                                 Pharmacy:           



                                                  [facility]           



                                                  #[id number]           

 

     allopurinol      2019-0      Yes                      = 1 tab,           Me

moria



     300 mg oral      8-17                               PO, Daily,           l



     tablet      02:39:                               # 90 tab,           [place] [id number]                                 Pharmacy:           



                                                  [facility]           



                                                  #[id number]           

 

     allopurinol      2019-0      Yes                      = 1 tab,           Me

moria



     300 mg oral      8-17                               PO, Daily,           l



     tablet      02:39:                               # 90 tab,           [place] [id number]                                 Pharmacy:           



                                                  [facility]           



                                                  #[id number]           

 

     allopurinol      2019-0      Yes                      = 1 tab,           Me

moria



     300 mg oral      8-17                               PO, Daily,           l



     tablet      02:39:                               # 90 tab,           [place] [id number]                                 Pharmacy:           



                                                  [facility]           



                                                  #[id number]           

 

     allopurinol      2019-0      Yes                      = 1 tab,           Me

moria



     300 mg oral      8-17                               PO, Daily,           l



     tablet      02:39:                               # 90 tab,           [place] [id number]                                 Pharmacy:           



                                                  [facility]           



                                                  #[id number]           

 

     allopurinol      2019-0      Yes                      = 1 tab,           Me

moria



     300 mg oral      8-17                               PO, Daily,           l



     tablet      02:39:                               # 90 tab,           [place] [id number]                                 Pharmacy:           



                                                  [facility]           



                                                  #[id number]           

 

     QUEtiapine      2019-0      Yes                      50 mg = 1           Me

moria



     50 mg oral      6-06                               tab, PO,           l



     tablet      15:28:                               Bedtime, #           [facility]

nn



               00                                 30 tab, 1           



                                                  Refill(s)           

 

     QUEtiapine      2019-0      Yes                      50 mg = 1           Me

moria



     50 mg oral      6-06                               tab, PO,           l



     tablet      15:28:                               Bedtime, #           [facility]

nn



               00                                 30 tab, 1           



                                                  Refill(s)           

 

     QUEtiapine      2019-0      Yes                      50 mg = 1           Me

moria



     50 mg oral      6-06                               tab, PO,           l



     tablet      15:28:                               Bedtime, #           [facility]

nn



               00                                 30 tab, 1           



                                                  Refill(s)           

 

     QUEtiapine      2019-0      Yes                      50 mg = 1           Me

moria



     50 mg oral      6-06                               tab, PO,           l



     tablet      15:28:                               Bedtime, #           [facility]

nn



               00                                 30 tab, 1           



                                                  Refill(s)           

 

     QUEtiapine      2019-0      Yes                      50 mg = 1           Me

moria



     50 mg oral      6-06                               tab, PO,           l



     tablet      15:28:                               Bedtime, #           [facility]

nn



               00                                 30 tab, 1           



                                                  Refill(s)           

 

     QUEtiapine      2019-0      Yes                      50 mg = 1           Me

moria



     50 mg oral      6-06                               tab, PO,           l



     tablet      15:28:                               Bedtime, #           [facility]

nn



               00                                 30 tab, 1           



                                                  Refill(s)           

 

     QUEtiapine      2019-0      Yes                      50 mg = 1           Me

moria



     50 mg oral      6-06                               tab, PO,           l



     tablet      15:28:                               Bedtime, #           [facility]

nn



               00                                 30 tab, 1           



                                                  Refill(s)           

 

     QUEtiapine      2019-0      Yes                      50 mg = 1           Me

moria



     50 mg oral      6-06                               tab, PO,           l



     tablet      15:28:                               Bedtime, #           [facility]

nn



               00                                 30 tab, 1           



                                                  Refill(s)           

 

     QUEtiapine      2019-0      Yes                      50 mg = 1           Me

moria



     50 mg oral      6-06                               tab, PO,           l



     tablet      15:28:                               Bedtime, #           [facility]

nn



               00                                 30 tab, 1           



                                                  Refill(s)           

 

     eletriptan      2019-0      Yes                      40 mg = 1           Me

moria



     40 mg oral      6-06                               tab, PO,           l



     tablet      15:26:                               Daily, PRN           [facility]

nn



               00                                 for            



                                                  migraine           



                                                  headache,           



                                                  may repeat           



                                                  dose once           



                                                  in 2           



                                                  hours, # 6           



                                                  tab, 0           



                                                  Refill(s)           

 

     eletriptan      2019-0      Yes                      40 mg = 1           Me

[facility]



     40 mg oral      6-06                               tab, PO,           l



     tablet      15:26:                               Daily, PRN           [facility]

nn



               00                                 for            



                                                  migraine           



                                                  headache,           



                                                  may repeat           



                                                  dose once           



                                                  in 2           



                                                  hours, # 6           



                                                  tab, 0           



                                                  Refill(s)           

 

     eletriptan      2019-0      Yes                      40 mg = 1           Me

[facility]



     40 mg oral      6-06                               tab, PO,           l



     tablet      15:26:                               Daily, PRN           [facility]

nn



               00                                 for            



                                                  migraine           



                                                  headache,           



                                                  may repeat           



                                                  dose once           



                                                  in 2           



                                                  hours, # 6           



                                                  tab, 0           



                                                  Refill(s)           

 

     eletriptan      2019-0      Yes                      40 mg = 1           Me

[facility]



     40 mg oral      6-06                               tab, PO,           l



     tablet      15:26:                               Daily, PRN           [facility]

nn



               00                                 for            



                                                  migraine           



                                                  headache,           



                                                  may repeat           



                                                  dose once           



                                                  in 2           



                                                  hours, # 6           



                                                  tab, 0           



                                                  Refill(s)           

 

     eletriptan      2019      Yes                      40 mg = 1           Me

[facility]



     40 mg oral      6-06                               tab, PO,           l



     tablet      15:26:                               Daily, PRN           [facility]

[facility]



               00                                 for            



                                                  migraine           



                                                  headache,           



                                                  may repeat           



                                                  dose once           



                                                  in 2           



                                                  hours, # 6           



                                                  tab, 0           



                                                  Refill(s)           

 

     eletriptan      -      Yes                      40 mg = 1           Me

[facility]



     40 mg oral      6-06                               tab, PO,           l



     tablet      15:26:                               Daily, PRN           [facility]

[facility]



               00                                 for            



                                                  migraine           



                                                  headache,           



                                                  may repeat           



                                                  dose once           



                                                  in 2           



                                                  hours, # 6           



                                                  tab, 0           



                                                  Refill(s)           

 

     eletriptan      2019      Yes                      40 mg = 1           Me

[facility]



     40 mg oral      6-06                               tab, PO,           l



     tablet      15:26:                               Daily, PRN           [facility]

nn



               00                                 for            



                                                  migraine           



                                                  headache,           



                                                  may repeat           



                                                  dose once           



                                                  in 2           



                                                  hours, # 6           



                                                  tab, 0           



                                                  Refill(s)           

 

     eletriptan            Yes                      40 mg = 1           Me

[facility]



     40 mg oral      6-06                               tab, PO,           l



     tablet      15:26:                               Daily, PRN           [facility]

[facility]



               00                                 for            



                                                  migraine           



                                                  headache,           



                                                  may repeat           



                                                  dose once           



                                                  in 2           



                                                  hours, # 6           



                                                  tab, 0           



                                                  Refill(s)           

 

     eletriptan            Yes                      40 mg = 1           Me

[facility]



     40 mg oral      6-06                               tab, PO,           l



     tablet      15:26:                               Daily, PRN           [facility]

[facility]



               00                                 for            



                                                  migraine           



                                                  headache,           



                                                  may repeat           



                                                  dose once           



                                                  in 2           



                                                  hours, # 6           



                                                  tab, 0           



                                                  Refill(s)           

 

     atorvastati            Yes                      See            [facility]

a



     n 40 mg      3-14                               Instruct[facility]           l



     oral tablet      15:07:                               ns, TAKE 1           

[facility]



               35                                 TABLET BY           



                                                  MOUTH           



                                                  EVERY           



                                                  NIGHT AT           



                                                  BEDTIME, #           



                                                  90 tab, 1           



                                                  Refill(s),           



                                                  Pharmacy:           



                                                  [facility]           



                                                  Count includes the [facility]           

 

     atorvastati            Yes                      See            [facility]

a



     n 40 mg      3-14                               Instructio           l



     oral tablet      15:07:                               ns, TAKE 1           

[facility]



               35                                 TABLET BY           



                                                  MOUTH           



                                                  EVERY           



                                                  NIGHT AT           



                                                  BEDTIME, #           



                                                  90 tab, 1           



                                                  Refill(s),           



                                                  Pharmacy:           



                                                  [facility]           



                                                  Count includes the [facility]           

 

     atorvastati            Yes                      See            [facility]

a



     n 40 mg      3-14                               Instructio           l



     oral tablet      15:07:                               ns, TAKE 1           

[facility]



               35                                 TABLET BY           



                                                  MOUTH           



                                                  EVERY           



                                                  NIGHT AT           



                                                  BEDTIME, #           



                                                  90 tab, 1           



                                                  Refill(s),           



                                                  Pharmacy:           



                                                  [facility]           



                                                  [email or web address] [address]            Yes                      See            [facility]

a



     n 40 mg      3-14                               Instructio           l



     oral tablet      15:07:                               ns, TAKE 1           

[facility]



               35                                 TABLET BY           



                                                  MOUTH           



                                                  EVERY           



                                                  NIGHT AT           



                                                  BEDTIME, #           



                                                  90 tab, 1           



                                                  Refill(s),           



                                                  Pharmacy:           



                                                  [facility]           



                                                  [email or web address] [address]            Yes                      See            [facility]

a



     n 40 mg      3-14                               Instructio           l



     oral tablet      15:07:                               ns, TAKE 1           

[facility]



               35                                 TABLET BY           



                                                  MOUTH           



                                                  EVERY           



                                                  NIGHT AT           



                                                  BEDTIME, #           



                                                  90 tab, 1           



                                                  Refill(s),           



                                                  Pharmacy:           



                                                  [facility]           



                                                  [email or web address] [address]            Yes                      See            [facility]

a



     n 40 mg      3-14                               Instructio           l



     oral tablet      15:07:                               ns, TAKE 1           

[facility]



               35                                 TABLET BY           



                                                  MOUTH           



                                                  EVERY           



                                                  NIGHT AT           



                                                  BEDTIME, #           



                                                  90 tab, 1           



                                                  Refill(s),           



                                                  Pharmacy:           



                                                  [facility]           



                                                  [email or web address] [address]            Yes                      See            [facility]

a



     n 40 mg      3-14                               Instructio           l



     oral tablet      15:07:                               ns, TAKE 1           

[facility]



               35                                 TABLET BY           



                                                  MOUTH           



                                                  EVERY           



                                                  NIGHT AT           



                                                  BEDTIME, #           



                                                  90 tab, 1           



                                                  Refill(s),           



                                                  Pharmacy:           



                                                  [facility]           



                                                  [email or web address] [address]            Yes                      See            [facility]

a



     n 40 mg      3-14                               Instructio           l



     oral tablet      15:07:                               ns, TAKE 1           

[facility]



               35                                 TABLET BY           



                                                  MOUTH           



                                                  EVERY           



                                                  NIGHT AT           



                                                  BEDTIME, #           



                                                  90 tab, 1           



                                                  Refill(s),           



                                                  Pharmacy:           



                                                  [facility]           



                                                  [email or web address] [address]            Yes                      See            [facility]

a



     n 40 mg      3-14                               Instructio           l



     oral tablet      15:07:                               ns, TAKE 1           

[facility]



               35                                 TABLET BY           



                                                  MOUTH           



                                                  EVERY           



                                                  NIGHT AT           



                                                  BEDTIME, #           



                                                  90 tab, 1           



                                                  Refill(s),           



                                                  Pharmacy:           



                                                  [facility]           



                                                  [email or web address]           



                                                  [id number]           

 

     amLODIPine            Yes                      See            [facility]



     5 mg oral      3-14                               Instructio           l



     tablet      15:07:                               ns, TAKE 1           [facility]

[facility]



               33                                 TABLET BY           



                                                  MOUTH           



                                                  EVERY DAY,           



                                                  # 90 tab,           



                                                  1              



                                                  Refill(s),           



                                                  Pharmacy:           



                                                  [facility]           



                                                  [email or web address]           



                                                  [id number]           

 

     amLODIPine            Yes                      See            [facility]



     5 mg oral      3-14                               Instructio           l



     tablet      15:07:                               ns, TAKE 1           [facility]

[facility]



               33                                 TABLET BY           



                                                  MOUTH           



                                                  EVERY DAY,           



                                                  # 90 tab,           



                                                  1              



                                                  Refill(s),           



                                                  Pharmacy:           



                                                  [place] [id number]           

 

     amLODIPine            Yes                      See            [facility]



     5 mg oral      3-14                               Instructio           l



     tablet      15:07:                               ns, TAKE 1           [facility]

[facility]



               33                                 TABLET BY           



                                                  MOUTH           



                                                  EVERY DAY,           



                                                  # 90 tab,           



                                                  1              



                                                  Refill(s),           



                                                  Pharmacy:           



                                                  [place] [id number]           

 

     amLODIPine            Yes                      See            [facility]



     5 mg oral      3-14                               Instructio           l



     tablet      15:07:                               ns, TAKE 1           [facility]

[facility]



               33                                 TABLET BY           



                                                  MOUTH           



                                                  EVERY DAY,           



                                                  # 90 tab,           



                                                  1              



                                                  Refill(s),           



                                                  Pharmacy:           



                                                  [place] [id number]           

 

     amLODIPine            Yes                      See            [facility]



     5 mg oral      3-14                               Instructio           l



     tablet      15:07:                               ns, TAKE 1           [facility]

[facility]



               33                                 TABLET BY           



                                                  MOUTH           



                                                  EVERY DAY,           



                                                  # 90 tab,           



                                                  1              



                                                  Refill(s),           



                                                  Pharmacy:           



                                                  [place] [id number]           

 

     amLODIPine            Yes                      See            [facility]



     5 mg oral      3-14                               Instructio           l



     tablet      15:07:                               ns, TAKE 1           [facility]

[facility]



               33                                 TABLET BY           



                                                  MOUTH           



                                                  EVERY DAY,           



                                                  # 90 tab,           



                                                  1              



                                                  Refill(s),           



                                                  Pharmacy:           



                                                  [place] [id number]           

 

     amLODIPine            Yes                      See            [facility]



     5 mg oral      3-14                               Instructio           l



     tablet      15:07:                               ns, TAKE 1           [facility]

[facility]



               33                                 TABLET BY           



                                                  MOUTH           



                                                  EVERY DAY,           



                                                  # 90 tab,           



                                                  1              



                                                  Refill(s),           



                                                  Pharmacy:           



                                                  [place] [id number]           

 

     amLODIPine            Yes                      See            [facility]



     5 mg oral      3-14                               Instructio           l



     tablet      15:07:                               ns, TAKE 1           [facility]

[facility]



               33                                 TABLET BY           



                                                  MOUTH           



                                                  EVERY DAY,           



                                                  # 90 tab,           



                                                  1              



                                                  Refill(s),           



                                                  Pharmacy:           



                                                  [facility]           



                                                  [email or web address]           



                                                  [id number]           

 

     amLODIPine            Yes                      See            [facility]



     5 mg oral      3-14                               Instructio           l



     tablet      15:07:                               ns, TAKE 1           [facility]

[facility]



               33                                 TABLET BY           



                                                  MOUTH           



                                                  EVERY DAY,           



                                                  # 90 tab,           



                                                  1              



                                                  Refill(s),           



                                                  Pharmacy:           



                                                  [facility]           



                                                  [email or web address]           



                                                  [id number]           

 

     lisinopril            Yes                      See            [facility]



     5 mg oral      8-21                               [facility]           l



     tablet      19:00:                               ns, TAKE 1           [facility]

nn



               30                                 TABLET BY           



                                                  MOUTH           



                                                  DAILY, #           



                                                  90 tab, 1           



                                                  Refill(s),           



                                                  Pharmacy:           



                                                  [facility]           



                                                  [email or web address]           



                                                  [id number]           

 

     lisinopril            Yes                      See            [facility]



     5 mg oral      8-21                               Instruct[facility]           l



     tablet      19:00:                               ns, TAKE 1           [facility]

nn



               30                                 TABLET BY           



                                                  MOUTH           



                                                  DAILY, #           



                                                  90 tab, 1           



                                                  Refill(s),           



                                                  Pharmacy:           



                                                  [facility]           



                                                  [email or web address]           



                                                  [id number]           

 

     lisinopril            Yes                      See            [facility]



     5 mg oral      8-21                               Instructio           l



     tablet      19:00:                               ns, TAKE 1           [facility]

nn



               30                                 TABLET BY           



                                                  MOUTH           



                                                  DAILY, #           



                                                  90 tab, 1           



                                                  Refill(s),           



                                                  Pharmacy:           



                                                  [facility]           



                                                  [email or web address]           



                                                  [id number]           

 

     lisinopril            Yes                      See            [facility]



     5 mg oral      8-21                               Instruct[facility]           l



     tablet      19:00:                               ns, TAKE 1           [facility]

nn



               30                                 TABLET BY           



                                                  MOUTH           



                                                  DAILY, #           



                                                  90 tab, 1           



                                                  Refill(s),           



                                                  Pharmacy:           



                                                  [facility]           



                                                  [email or web address]           



                                                  [id number]           

 

     lisinopril            Yes                      See            [facility]



     5 mg oral      8-21                               Instructio           l



     tablet      19:00:                               ns, TAKE 1           [facility]

nn



               30                                 TABLET BY           



                                                  MOUTH           



                                                  DAILY, #           



                                                  90 tab, 1           



                                                  Refill(s),           



                                                  Pharmacy:           



                                                  [facility]           



                                                  [email or web address]           



                                                  [id number]           

 

     lisinopril            Yes                      See            [facility]



     5 mg oral      8-21                               Instructio           l



     tablet      19:00:                               ns, TAKE 1           [facility]

nn



               30                                 TABLET BY           



                                                  MOUTH           



                                                  DAILY, #           



                                                  90 tab, 1           



                                                  Refill(s),           



                                                  Pharmacy:           



                                                  [facility]           



                                                  [email or web address]           



                                                  [id number]           

 

     lisinopril            Yes                      See            [facility]



     5 mg oral      8-21                               Instructio           l



     tablet      19:00:                               ns, TAKE 1           [facility]

nn



               30                                 TABLET BY           



                                                  MOUTH           



                                                  DAILY, #           



                                                  90 tab, 1           



                                                  Refill(s),           



                                                  Pharmacy:           



                                                  [facility]           



                                                  [email or web address]           



                                                  [id number]           

 

     lisinopril            Yes                      See            [facility]



     5 mg oral      8-21                               Instructio           l



     tablet      19:00:                               ns, TAKE 1           [facility]

nn



               30                                 TABLET BY           



                                                  MOUTH           



                                                  DAILY, #           



                                                  90 tab, 1           



                                                  Refill(s),           



                                                  Pharmacy:           



                                                  [facility]           



                                                  [email or web address]           



                                                  [id number]           

 

     lisinopril            Yes                      See            [facility]



     5 mg oral      8-21                               Instructio           l



     tablet      19:00:                               ns, TAKE 1           [facility]

[facility]



               30                                 TABLET BY           



                                                  MOUTH           



                                                  DAILY, #           



                                                  90 tab, 1           



                                                  Refill(s),           



                                                  Pharmacy:           



                                                  [facility]           



                                                  [email or web address]           



                                                  [id number]           

 

     atorvastati            Yes                      See            [facility]

a



     n 40 mg      8-21                               Instruct[facility]           l



     oral tablet      18:50:                               ns, TAKE 1           

[facility]



               45                                 TABLET BY           



                                                  MOUTH           



                                                  EVERY           



                                                  NIGHT AT           



                                                  BEDTIME, #           



                                                  30 tab, 5           



                                                  Refill(s),           



                                                  Pharmacy:           



                                                  [facility]           



                                                  [email or web address] [address]            Yes                      See            [facility]

a



     n 40 mg      8-21                               Instructio           l



     oral tablet      18:50:                               ns, TAKE 1           

[facility]



               45                                 TABLET BY           



                                                  MOUTH           



                                                  EVERY           



                                                  NIGHT AT           



                                                  BEDTIME, #           



                                                  30 tab, 5           



                                                  Refill(s),           



                                                  Pharmacy:           



                                                  [facility]           



                                                  [email or web address] [address]            Yes                      See            [facility]

a



     n 40 mg      8-21                               Instructio           l



     oral tablet      18:50:                               ns, TAKE 1           

[facility]



               45                                 TABLET BY           



                                                  MOUTH           



                                                  EVERY           



                                                  NIGHT AT           



                                                  BEDTIME, #           



                                                  30 tab, 5           



                                                  Refill(s),           



                                                  Pharmacy:           



                                                  [facility]           



                                                  [email or web address] [address]            Yes                      See            [facility]

a



     n 40 mg      8-21                               Instructio           l



     oral tablet      18:50:                               ns, TAKE 1           

[facility]



               45                                 TABLET BY           



                                                  MOUTH           



                                                  EVERY           



                                                  NIGHT AT           



                                                  BEDTIME, #           



                                                  30 tab, 5           



                                                  Refill(s),           



                                                  Pharmacy:           



                                                  [facility]           



                                                  [email or web address] [address]            Yes                      See            [facility]

a



     n 40 mg      8-21                               Instructio           l



     oral tablet      18:50:                               ns, TAKE 1           

[facility]



               45                                 TABLET BY           



                                                  MOUTH           



                                                  EVERY           



                                                  NIGHT AT           



                                                  BEDTIME, #           



                                                  30 tab, 5           



                                                  Refill(s),           



                                                  Pharmacy:           



                                                  [facility]           



                                                  [email or web address] [address]            Yes                      See            [facility]

a



     n 40 mg      8-21                               Instructio           l



     oral tablet      18:50:                               ns, TAKE 1           

[facility]



               45                                 TABLET BY           



                                                  MOUTH           



                                                  EVERY           



                                                  NIGHT AT           



                                                  BEDTIME, #           



                                                  30 tab, 5           



                                                  Refill(s),           



                                                  Pharmacy:           



                                                  [facility]           



                                                  [email or web address] [address]            Yes                      See            [facility]

a



     n 40 mg      8-21                               Instructio           l



     oral tablet      18:50:                               ns, TAKE 1           

[facility]



               45                                 TABLET BY           



                                                  MOUTH           



                                                  EVERY           



                                                  NIGHT AT           



                                                  BEDTIME, #           



                                                  30 tab, 5           



                                                  Refill(s),           



                                                  Pharmacy:           



                                                  [facility]           



                                                  [email or web address] [address]            Yes                      See            [facility]

a



     n 40 mg      8-21                               Instructio           l



     oral tablet      18:50:                               ns, TAKE 1           

[facility]



               45                                 TABLET BY           



                                                  MOUTH           



                                                  EVERY           



                                                  NIGHT AT           



                                                  BEDTIME, #           



                                                  30 tab, 5           



                                                  Refill(s),           



                                                  Pharmacy:           



                                                  [facility]           



                                                  [email or web address] [address]            Yes                      See            [facility]

a



     n 40 mg      8-21                               Instructio           l



     oral tablet      18:50:                               ns, TAKE 1           

[facility]



               45                                 TABLET BY           



                                                  MOUTH           



                                                  EVERY           



                                                  NIGHT AT           



                                                  BEDTIME, #           



                                                  30 tab, 5           



                                                  Refill(s),           



                                                  Pharmacy:           



                                                  [place] [id number]           

 

     amLODIPine            Yes                      See            [facility]



     5 mg oral      8-21                               Instruct[facility]           l



     tablet      18:50:                               ns, TAKE 1           [facility]

nn



               41                                 TABLET BY           



                                                  MOUTH           



                                                  EVERY DAY,           



                                                  # 30 tab,           



                                                  5              



                                                  Refill(s),           



                                                  Pharmacy:           



                                                  [place] [address]           

 

     amLODIPine            Yes                      See            [facility]



     5 mg oral      8-21                               Instructio           l



     tablet      18:50:                               ns, TAKE 1           [facility]

[facility]



               41                                 TABLET BY           



                                                  MOUTH           



                                                  EVERY DAY,           



                                                  # 30 tab,           



                                                  5              



                                                  Refill(s),           



                                                  Pharmacy:           



                                                  [place] [address]           

 

     amLODIPine            Yes                      See            [facility]



     5 mg oral      8-21                               Instructio           l



     tablet      18:50:                               ns, TAKE 1           [facility]

[facility]



               41                                 TABLET BY           



                                                  MOUTH           



                                                  EVERY DAY,           



                                                  # 30 tab,           



                                                  5              



                                                  Refill(s),           



                                                  Pharm[address]           



                                                  [address]            [address]                      See            [facility]



     5 mg oral      8-21                               Instructio           l



     tablet      18:50:                               ns, TAKE 1           [facility]

nn



               41                                 TABLET BY           



                                                  MOUTH           



                                                  EVERY DAY,           



                                                  # 30 tab,           



                                                  5              



                                                  Refill(s),           



                                                  Pharmacy:           



                                                  [facility]           



                                                  [email or web address]           



                                                  [id number]           

 

     amLODIPine            Yes                      See            [facility]



     5 mg oral      8-21                               Instructio           l



     tablet      18:50:                               ns, TAKE 1           [facility]

nn



               41                                 TABLET BY           



                                                  MOUTH           



                                                  EVERY DAY,           



                                                  # 30 tab,           



                                                  5              



                                                  Refill(s),           



                                                  Pharmacy:           



                                                  [facility]           



                                                  [email or web address]           



                                                  [id number]           

 

     amLODIPine            Yes                      See            [facility]



     5 mg oral      8-21                               Instructio           l



     tablet      18:50:                               ns, TAKE 1           [facility]

nn



               41                                 TABLET BY           



                                                  MOUTH           



                                                  EVERY DAY,           



                                                  # 30 tab,           



                                                  5              



                                                  Refill(s),           



                                                  Pharmacy:           



                                                  [facility]           



                                                  [email or web address]           



                                                  [id number]           

 

     amLODIPine            Yes                      See            [facility]



     5 mg oral      8-21                               Instructio           l



     tablet      18:50:                               ns, TAKE 1           [facility]

nn



               41                                 TABLET BY           



                                                  MOUTH           



                                                  EVERY DAY,           



                                                  # 30 tab,           



                                                  5              



                                                  Refill(s),           



                                                  Pharmacy:           



                                                  [address]            [address]                      See            [facility]



     5 mg oral      8-21                               Instructio           l



     tablet      18:50:                               ns, TAKE 1           [facility]

nn



               41                                 TABLET BY           



                                                  MOUTH           



                                                  EVERY DAY,           



                                                  # 30 tab,           



                                                  5              



                                                  Refill(s),           



                                                  Pharmacy:           



                                                  [address]            Yes                      See            [facility]



     5 mg oral      8-21                               Instruct[facility]           l



     tablet      18:50:                               ns, TAKE 1           [facility]

nn



               41                                 TABLET BY           



                                                  MOUTH           



                                                  EVERY DAY,           



                                                  # 30 tab,           



                                                  5              



                                                  Refill(s),           



                                                  Pharmacy:           



                                                  [facility]           



                                                  [facility]           



                                                  Count includes the [facility]           

 

     lisinopril            No                       See            [facility]



     5 mg oral      7-31                               Instructio           l



     tablet      15:28:                               ns, # 90           [facility]



               34                                 tab, TAKE           



                                                  1 TABLET           



                                                  BY MOUTH           



                                                  DAILY,           



                                                  Pharmacy:           



                                                  [facility]           



                                                  D[facility] Store           



                                                  Count includes the [facility]           

 

     lisinopril            No                       See            [facility]



     5 mg oral      7-31                               Instructio           l



     tablet      15:28:                               ns, # 90           [facility]



               34                                 tab, TAKE           



                                                  1 TABLET           



                                                  BY MOUTH           



                                                  DAILY,           



                                                  Pharmacy:           



                                                  [facility]           



                                                  D[facility] Store           



                                                  Count includes the [facility]           

 

     lisinopril            No                       See            [facility]



     5 mg oral      7-31                               Instructio           l



     tablet      15:28:                               ns, # 90           [facility]



               34                                 tab, TAKE           



                                                  1 TABLET           



                                                  BY MOUTH           



                                                  DAILY,           



                                                  Pharmacy:           



                                                  [facility]           



                                                  D[facility] Store           



                                                  Count includes the [facility]           

 

     lisinopril            No                       See            [facility]



     5 mg oral      7-31                               Instructio           l



     tablet      15:28:                               ns, # 90           [facility]



               34                                 tab, TAKE           



                                                  1 TABLET           



                                                  BY MOUTH           



                                                  DAILY,           



                                                  Pharmacy:           



                                                  [facility]           



                                                  D[facility] Store           



                                                  Count includes the [facility]           

 

     lisinopril            No                       See            [facility]



     5 mg oral      7-31                               Instructio           l



     tablet      15:28:                               ns, # 90           [facility]



               34                                 tab, TAKE           



                                                  1 TABLET           



                                                  BY MOUTH           



                                                  DAILY,           



                                                  Pharmacy:           



                                                  [facility]           



                                                  Drug Store           



                                                  Count includes the [facility]           

 

     lisinopril            No                       See            [facility]



     5 mg oral      7-31                               Instructio           l



     tablet      15:28:                               ns, # 90           [facility]



               34                                 tab, TAKE           



                                                  1 TABLET           



                                                  BY MOUTH           



                                                  DAILY,           



                                                  Pharmacy:           



                                                  [facility]           



                                                  Drug Store           



                                                  Count includes the [facility]           

 

     lisinopril            No                       See            [facility]



     5 mg oral      7-31                               Instructio           l



     tablet      15:28:                               ns, # 90           [facility]



               34                                 tab, TAKE           



                                                  1 TABLET           



                                                  BY MOUTH           



                                                  DAILY,           



                                                  Pharmacy:           



                                                  [facility]           



                                                  Drug Store           



                                                  Count includes the [facility]           

 

     lisinopril      0      No                       See            [facility]



     5 mg oral      7-31                               Instructio           l



     tablet      15:28:                               ns, # 90           [facility]



               34                                 tab, TAKE           



                                                  1 TABLET           



                                                  BY MOUTH           



                                                  DAILY,           



                                                  Pharmacy:           



                                                  [facility]           



                                                  Drug Store           



                                                  Count includes the [facility]           

 

     lisinopril            No                       See            [facility]



     5 mg oral      7-31                               Instructio           l



     tablet      15:28:                               ns, # 90           [facility]



               34                                 tab, TAKE           



                                                  1 TABLET           



                                                  BY MOUTH           



                                                  DAILY,           



                                                  Pharmacy:           



                                                  [facility]           



                                                  [id number]           

 

     allopurinol      2018-0      No                       300 mg = 1           

[facility]



     300 mg oral      5-10                               tab, PO,           l



     tablet      13:59:                               Daily, #           [facility]



               00                                 90 tab, 1           



                                                  Refill(s),           



                                                  Pharmacy:           



                                                  [facility]           



                                                  [id number]           

 

     allopurinol      2018-0      No                       300 mg = 1           

[facility]



     300 mg oral      5-10                               tab, PO,           l



     tablet      13:59:                               Daily, #           [facility]



               00                                 90 tab, 1           



                                                  Refill(s),           



                                                  Pharmacy:           



                                                  [facility]           



                                                  [id number]           

 

     allopurinol      -0      No                       300 mg = 1           

[facility]



     300 mg oral      5-10                               tab, PO,           l



     tablet      13:59:                               Daily, #           [facility]



               00                                 90 tab, 1           



                                                  Refill(s),           



                                                  Pharmacy:           



                                                  [place] [id number]           

 

     allopurinol      -0      No                       300 mg = 1           

[facility]



     300 mg oral      5-10                               tab, PO,           l



     tablet      13:59:                               Daily, #           [facility]



               00                                 90 tab, 1           



                                                  Refill(s),           



                                                  Pharmacy:           



                                                  [facility]           



                                                  [id number]           

 

     allopurinol      -0      No                       300 mg = 1           

[facility]



     300 mg oral      5-10                               tab, PO,           l



     tablet      13:59:                               Daily, #           [facility]



               00                                 90 tab, 1           



                                                  Refill(s),           



                                                  Pharmacy:           



                                                  [facility]           



                                                  [id number]           

 

     allopurinol      2018-0      No                       300 mg = 1           

[facility]



     300 mg oral      5-10                               tab, PO,           l



     tablet      13:59:                               Daily, #           [facility]



               00                                 90 tab, 1           



                                                  Refill(s),           



                                                  Pharmacy:           



                                                  [facility]           



                                                  [id number]           

 

     allopurinol      2018-0      No                       300 mg = 1           

[facility]



     300 mg oral      5-10                               tab, PO,           l



     tablet      13:59:                               Daily, #           [facility]



               00                                 90 tab, 1           



                                                  Refill(s),           



                                                  Pharmacy:           



                                                  [facility]           



                                                  [id number]           

 

     allopurinol      2018-0      No                       300 mg = 1           

[facility]



     300 mg oral      5-10                               tab, PO,           l



     tablet      13:59:                               Daily, #           [facility]



               00                                 90 tab, 1           



                                                  Refill(s),           



                                                  Pharmacy:           



                                                  [facility]           



                                                  [id number]           

 

     allopurinol      2018-0      No                       300 mg = 1           

[facility]



     300 mg oral      5-10                               tab, PO,           l



     tablet      13:59:                               Daily, #           [facility]



               00                                 90 tab, 1           



                                                  Refill(s),           



                                                  Pharmacy:           



                                                  [place] [id number]           

 

     lisinopril            No                       See            [facility]



     5 mg oral      5-01                               Instruct[facility]           l



     tablet      12:38:                               ns, # 90           [facility]



               18                                 tab, TAKE           



                                                  1 TABLET           



                                                  BY MOUTH           



                                                  DAILY,           



                                                  Pharmacy:           



                                                  [place] [id number]           

 

     ALLOPURINOL      0      Yes                      See            [facility]

a



     300MG      5-01                               Instruct[facility]           l



     TABLETS      12:38:                               ns, # 90           [facility]

n



               18                                 tab, TAKE           



                                                  1 TABLET           



                                                  BY MOUTH           



                                                  DAILY,           



                                                  Pharmacy:           



                                                  [facility]           



                                                  [email or web address]           



                                                  [id number]           

 

     lisinopril            No                       See            [facility]



     5 mg oral      5-01                               Instructio           l



     tablet      12:38:                               ns, # 90           [facility]



               18                                 tab, TAKE           



                                                  1 TABLET           



                                                  BY MOUTH           



                                                  DAILY,           



                                                  Pharmacy:           



                                                  [facility]           



                                                  [email or web address]           



                                                  [id number]           

 

     ALLOPURINOL            Yes                      See            [facility]

a



     300MG      5-01                               Instructio           l



     TABLETS      12:38:                               ns, # 90           [facility]

n



               18                                 tab, TAKE           



                                                  1 TABLET           



                                                  BY MOUTH           



                                                  DAILY,           



                                                  Pharmacy:           



                                                  [facility]           



                                                  [email or web address]           



                                                  [id number]           

 

     lisinopril            No                       See            [facility]



     5 mg oral      5-01                               Instructio           l



     tablet      12:38:                               ns, # 90           [facility]



               18                                 tab, TAKE           



                                                  1 TABLET           



                                                  BY MOUTH           



                                                  DAILY,           



                                                  Pharmacy:           



                                                  [facility]           



                                                  [email or web address]           



                                                  [id number]           

 

     ALLOPURINOL            Yes                      See            [facility]

a



     300MG      5-01                               Instructio           l



     TABLETS      12:38:                               ns, # 90           [facility]

n



               18                                 tab, TAKE           



                                                  1 TABLET           



                                                  BY MOUTH           



                                                  DAILY,           



                                                  Pharmacy:           



                                                  [facility]           



                                                  [email or web address]           



                                                  [id number]           

 

     lisinopril      0      No                       See            [facility]



     5 mg oral      5-01                               Instructio           l



     tablet      12:38:                               ns, # 90           [facility]



               18                                 tab, TAKE           



                                                  1 TABLET           



                                                  BY MOUTH           



                                                  DAILY,           



                                                  Pharmacy:           



                                                  [facility]           



                                                  [email or web address]           



                                                  [id number]           

 

     ALLOPURINOL      0      Yes                      See            [facility]

a



     300MG      5-01                               Instructio           l



     TABLETS      12:38:                               ns, # 90           [facility]

n



               18                                 tab, TAKE           



                                                  1 TABLET           



                                                  BY MOUTH           



                                                  DAILY,           



                                                  Pharmacy:           



                                                  [facility]           



                                                  [email or web address]           



                                                  [id number]           

 

     lisinopril      0      No                       See            [facility]



     5 mg oral      5-01                               Instructio           l



     tablet      12:38:                               ns, # 90           [facility]



               18                                 tab, TAKE           



                                                  1 TABLET           



                                                  BY MOUTH           



                                                  DAILY,           



                                                  Pharmacy:           



                                                  [facility]           



                                                  [email or web address]           



                                                  [id number]           

 

     ALLOPURINOL      [address]      Yes                      See            [facility]

a



     300MG      5-01                               Instruct[facility]           l



     TABLETS      12:38:                               ns, # 90           [facility]

n



               18                                 tab, TAKE           



                                                  1 TABLET           



                                                  BY MOUTH           



                                                  DAILY,           



                                                  Pharmacy:           



                                                  [facility]           



                                                  [email or web address]           



                                                  [id number]           

 

     lisinopril            No                       See            [facility]



     5 mg oral      5-01                               Instructio           l



     tablet      12:38:                               ns, # 90           [facility]



               18                                 tab, TAKE           



                                                  1 TABLET           



                                                  BY MOUTH           



                                                  DAILY,           



                                                  Pharmacy:           



                                                  [facility]           



                                                  [email or web address]           



                                                  [id number]           

 

     ALLOPURINOL            Yes                      See            [facility]

a



     300MG      5-01                               [facility]           l



     TABLETS      12:38:                               ns, # 90           [facility]

n



               18                                 tab, TAKE           



                                                  1 TABLET           



                                                  BY MOUTH           



                                                  DAILY,           



                                                  Pharmacy:           



                                                  [facility]           



                                                  [email or web address]           



                                                  [id number]           

 

     lisinopril            No                       See            [facility]



     5 mg oral      5-01                               Instruct[facility]           l



     tablet      12:38:                               ns, # 90           [facility]



               18                                 tab, TAKE           



                                                  1 TABLET           



                                                  BY MOUTH           



                                                  DAILY,           



                                                  Pharmacy:           



                                                  [facility]           



                                                  [email or web address]           



                                                  [id number]           

 

     ALLOPURINOL            Yes                      See            [facility]

a



     300MG      5-01                               Instruct[facility]           l



     TABLETS      12:38:                               ns, # 90           [facility]

n



               18                                 tab, TAKE           



                                                  1 TABLET           



                                                  BY MOUTH           



                                                  DAILY,           



                                                  Pharmacy:           



                                                  [facility]           



                                                  [email or web address]           



                                                  [id number]           

 

     lisinopril            No                       See            [facility]



     5 mg oral      5-01                               Instruct[facility]           l



     tablet      12:38:                               ns, # 90           [facility] [place]



               18                                 tab, TAKE           



                                                  1 TABLET           



                                                  BY MOUTH           



                                                  DAILY,           



                                                  Pharmacy:           



                                                  [facility]           



                                                  [email or web address]           



                                                  [id number]           

 

     ALLOPURINOL            Yes                      See            [facility]

a



     300MG      5-01                               Instruct[facility]           l



     TABLETS      12:38:                               ns, # 90           [facility]

n



               18                                 tab, TAKE           



                                                  1 TABLET           



                                                  BY MOUTH           



                                                  DAILY,           



                                                  Pharmacy:           



                                                  [facility]           



                                                  [email or web address]           



                                                  [id number]           

 

     lisinopril            No                       See            [facility]



     5 mg oral      5-01                               Instruct[facility]           l



     tablet      12:38:                               ns, # 90           [facility]



               18                                 tab, TAKE           



                                                  1 TABLET           



                                                  BY MOUTH           



                                                  DAILY,           



                                                  Pharmacy:           



                                                  [facility]           



                                                  [email or web address]           



                                                  [id number]           

 

     ALLOPURINOL            Yes                      See            [facility]

a



     300MG      5-01                               Instruct[facility]           l



     TABLETS      12:38:                               ns, # 90           [facility]

n



               18                                 tab, TAKE           



                                                  1 TABLET           



                                                  BY MOUTH           



                                                  DAILY,           



                                                  Pharmacy:           



                                                  [facility]           



                                                  [email or web address]           



                                                  [id number]           

 

     calcitriol            Yes                      0.25           [facility]



     0.25 mcg      3-28                               microgram           l



     oral      13:49:                               = 1 cap,           [facility]



     capsule      00                                 PO, Daily,           



                                                  # 90 cap,           



                                                  3              



                                                  Refill(s),           



                                                  Pharmacy:           



                                                  [facility]           



                                                  [email or web address]           



                                                  [id number]           

 

     calcitriol      0      Yes                      0.25           [facility]



     0.25 mcg      3-28                               microgram           l



     oral      13:49:                               = 1 cap,           [facility]



     capsule      00                                 PO, Daily,           



                                                  # 90 cap,           



                                                  3              



                                                  Refill(s),           



                                                  Pharmacy:           



                                                  [facility]           



                                                  [email or web address]           



                                                  [id number]           

 

     calcitriol            Yes                      0.25           [facility]



     0.25 mcg      3-28                               microgram           l



     oral      13:49:                               = 1 cap,           [facility]



     capsule      00                                 PO, Daily,           



                                                  # 90 cap,           



                                                  3              



                                                  Refill(s),           



                                                  Pharmacy:           



                                                  [facility]           



                                                  [email or web address]           



                                                  [id number]           

 

     calcitriol            Yes                      0.25           [facility]



     0.25 mcg      3-28                               microgram           l



     oral      13:49:                               = 1 cap,           [facility]



     capsule      00                                 PO, Daily,           



                                                  # 90 cap,           



                                                  3              



                                                  Refill(s),           



                                                  Pharmacy:           



                                                  [facility]           



                                                  [email or web address]           



                                                  [id number]           

 

     calcitriol            Yes                      0.25           [facility]



     0.25 mcg      3-28                               microgram           l



     oral      13:49:                               = 1 cap,           [facility]



     capsule      00                                 PO, Daily,           



                                                  # 90 cap,           



                                                  3              



                                                  Refill(s),           



                                                  Pharmacy:           



                                                  [facility]           



                                                  [email or web address]           



                                                  [id number]           

 

     calcitriol            Yes                      0.25           [facility]



     0.25 mcg      3-28                               microgram           l



     oral      13:49:                               = 1 cap,           [facility]



     capsule      00                                 PO, Daily,           



                                                  # 90 cap,           



                                                  3              



                                                  Refill(s),           



                                                  Pharmacy:           



                                                  [facility]           



                                                  [email or web address]           



                                                  [id number]           

 

     calcitriol            Yes                      0.25           [facility]



     0.25 mcg      3-28                               microgram           l



     oral      13:49:                               = 1 cap,           [facility]



     capsule      00                                 PO, Daily,           



                                                  # 90 cap,           



                                                  3              



                                                  Refill(s),           



                                                  Pharmacy:           



                                                  [facility]           



                                                  [email or web address]           



                                                  [id number]           

 

     calcitriol            Yes                      0.25           [facility]



     0.25 mcg      3-28                               microgram           l



     oral      13:49:                               = 1 cap,           [facility]



     capsule      00                                 PO, Daily,           



                                                  # 90 cap,           



                                                  3              



                                                  Refill(s),           



                                                  Pharmacy:           



                                                  [facility]           



                                                  [email or web address]           



                                                  [id number]           

 

     calcitriol            Yes                      0.25           [facility]



     0.25 mcg      3-28                               microgram           l



     oral      13:49:                               = 1 cap,           [facility]



     capsule      00                                 PO, Daily,           



                                                  # 90 cap,           



                                                  3              



                                                  Refill(s),           



                                                  Pharmacy:           



                                                  [facility]           



                                                  [email or web address]           



                                                  [id number]           

 

     Mupirocin            Yes                      1 appl,           [facility]

ia



     0.02 MG/MG      3-21                               TOP, BID,           l



     Topical      15:37:                               30 grams           [facility]

n



     Ointment      21                                 3each, # 3           



                                                  ea, 1           



                                                  Refill(s),           



                                                  Pharmacy:           



                                                  [facility]           



                                                  [email or web address]           



                                                  [id number]           

 

     Mupirocin      -0      Yes                      1 appl,           [facility]

ia



     0.02 MG/MG      3-21                               TOP, BID,           l



     Topical      15:37:                               30 grams           [facility]

n



     Ointment      21                                 3each, # 3           



                                                  ea, 1           



                                                  Refill(s),           



                                                  Pharmacy:           



                                                  [facility]           



                                                  [email or web address]           



                                                  Count includes the [facility]           

 

     Mupirocin      -0      Yes                      1 appl,           [facility]

ia



     0.02 MG/MG      3-21                               TOP, BID,           l



     Topical      15:37:                               30 grams           [facility]

n



     Ointment      21                                 3each, # 3           



                                                  ea, 1           



                                                  Refill(s),           



                                                  Pharmacy:           



                                                  [facility]           



                                                  [email or web address]           



                                                  [address] [place], [place] [address]pirocin      -0      Yes                      1 appl,           [facility]

ia



     0.02 MG/MG      3-21                               TOP, BID,           l



     Topical      15:37:                               30 grams           [facility]

n



     Ointment      21                                 3each, # 3           



                                                  ea, 1           



                                                  Refill(s),           



                                                  Pharmacy:           



                                                  [facility]           



                                                  [facility]           



                                                  [address] [place], [place] [address]pirocin      -0      Yes                      1 appl,           [facility]

ia



     0.02 MG/MG      3-21                               TOP, BID,           l



     Topical      15:37:                               30 grams           [facility]

n



     Ointment      21                                 3each, # 3           



                                                  ea, 1           



                                                  Refill(s),           



                                                  Pharmacy:           



                                                  [facility]           



                                                  [facility]           



                                                  [address] [place], [place] [address]pirocin      -0      Yes                      1 appl,           [facility]

ia



     0.02 MG/MG      3-21                               TOP, BID,           l



     Topical      15:37:                               30 grams           [facility]

n



     Ointment      21                                 3each, # 3           



                                                  ea, 1           



                                                  Refill(s),           



                                                  Pharmacy:           



                                                  [facility]           



                                                  [facility]           



                                                  Count includes the [facility]           

 

     Mupirocin      -0      Yes                      1 appl,           [facility]

ia



     0.02 MG/MG      3-21                               TOP, BID,           l



     Topical      15:37:                               30 grams           [facility]

n



     Ointment      21                                 3each, # 3           



                                                  ea, 1           



                                                  Refill(s),           



                                                  Pharmacy:           



                                                  [facility]           



                                                  [email or web address]           



                                                  Count includes the [facility]           

 

     Mupirocin      -0      Yes                      1 appl,           [facility]

ia



     0.02 MG/MG      3-21                               TOP, BID,           l



     Topical      15:37:                               30 grams           [facility]

n



     Ointment      21                                 3each, # 3           



                                                  ea, 1           



                                                  Refill(s),           



                                                  Pharmacy:           



                                                  [facility]           



                                                  [facility]           



                                                  Count includes the [facility]           

 

     Mupirocin      -0      Yes                      1 appl,           [facility]

ia



     0.02 MG/MG      3-21                               TOP, BID,           l



     Topical      15:37:                               30 grams           [facility]

n



     Ointment      21                                 3each, # 3           



                                                  ea, 1           



                                                  Refill(s),           



                                                  Pharmacy:           



                                                  [facility]           



                                                  [email or web address] [address]            Yes                      See            [facility]

a



     n 40 mg      3-21                               [facility]           l



     oral tablet      15:36:                               ns, TAKE 1           

[facility]



               22                                 TABLET BY           



                                                  MOUTH           



                                                  EVERY           



                                                  NIGHT AT           



                                                  BEDTIME, #           



                                                  30 tab, 5           



                                                  Refill(s),           



                                                  Pharmacy:           



                                                  [address]            Yes                      See            [facility]

a



     n 40 mg      3-21                               Instruct[facility]           l



     oral tablet      15:36:                               ns, TAKE 1           

[facility]



               22                                 TABLET BY           



                                                  MOUTH           



                                                  EVERY           



                                                  NIGHT AT           



                                                  BEDTIME, #           



                                                  30 tab, 5           



                                                  Refill(s),           



                                                  Pharmacy:           



                                                  [address]            Yes                      See            [facility]

a



     n 40 mg      3-21                               [facility]           l



     oral tablet      15:36:                               ns, TAKE 1           

[facility]



               22                                 TABLET BY           



                                                  MOUTH           



                                                  EVERY           



                                                  NIGHT AT           



                                                  BEDTIME, #           



                                                  30 tab, 5           



                                                  Refill(s),           



                                                  Pharmacy:           



                                                  [facility]           



                                                  [email or web address] [address]            Yes                      See            [facility]

a



     n 40 mg      3-21                               Instruct[facility]           l



     oral tablet      15:36:                               ns, TAKE 1           

[facility]



               22                                 TABLET BY           



                                                  MOUTH           



                                                  EVERY           



                                                  NIGHT AT           



                                                  BEDTIME, #           



                                                  30 tab, 5           



                                                  Refill(s),           



                                                  Pharmacy:           



                                                  [facility]           



                                                  [email or web address] [address]            Yes                      See            [facility]

a



     n 40 mg      3-21                               Instructio           l



     oral tablet      15:36:                               ns, TAKE 1           

[facility]



               22                                 TABLET BY           



                                                  MOUTH           



                                                  EVERY           



                                                  NIGHT AT           



                                                  BEDTIME, #           



                                                  30 tab, 5           



                                                  Refill(s),           



                                                  Pharmacy:           



                                                  [facility]           



                                                  [email or web address] [address]            Yes                      See            [facility]

a



     n 40 mg      3-21                               Instructio           l



     oral tablet      15:36:                               ns, TAKE 1           

[facility]



               22                                 TABLET BY           



                                                  MOUTH           



                                                  EVERY           



                                                  NIGHT AT           



                                                  BEDTIME, #           



                                                  30 tab, 5           



                                                  Refill(s),           



                                                  Pharmacy:           



                                                  [facility]           



                                                  [email or web address] [address]            Yes                      See            [facility]

a



     n 40 mg      3-21                               Instructio           l



     oral tablet      15:36:                               ns, TAKE 1           

[facility] [place]



               22                                 TABLET BY           



                                                  MOUTH           



                                                  EVERY           



                                                  NIGHT AT           



                                                  BEDTIME, #           



                                                  30 tab, 5           



                                                  Refill(s),           



                                                  Pharmacy:           



                                                  [facility]           



                                                  [email or web address] [address]            Yes                      See            [facility]

a



     n 40 mg      3-21                               Instructio           l



     oral tablet      15:36:                               ns, TAKE 1           

[facility]



               22                                 TABLET BY           



                                                  MOUTH           



                                                  EVERY           



                                                  NIGHT AT           



                                                  BEDTIME, #           



                                                  30 tab, 5           



                                                  Refill(s),           



                                                  Pharmacy:           



                                                  [place] [id number]           

 

     atorvastati            Yes                      See            [facility]

a



     n 40 mg      3-21                               Instructio           l



     oral tablet      15:36:                               ns, TAKE 1           

[facility]



               22                                 TABLET BY           



                                                  MOUTH           



                                                  EVERY           



                                                  NIGHT AT           



                                                  BEDTIME, #           



                                                  30 tab, 5           



                                                  Refill(s),           



                                                  Pharmacy:           



                                                  [place] [id number]           

 

     amLODIPine            Yes                      See            [facility]



     5 mg oral      3-21                               Instructio           l



     tablet      15:36:                               ns, TAKE 1           [facility]

nn



               18                                 TABLET BY           



                                                  MOUTH           



                                                  EVERY DAY,           



                                                  # 30 tab,           



                                                  5              



                                                  Refill(s),           



                                                  Pharmacy:           



                                                  [place] [id number]           

 

     amLODIPine            Yes                      See            [facility]



     5 mg oral      3-21                               Instructio           l



     tablet      15:36:                               ns, TAKE 1           [facility]

nn



               18                                 TABLET BY           



                                                  MOUTH           



                                                  EVERY DAY,           



                                                  # 30 tab,           



                                                  5              



                                                  Refill(s),           



                                                  Pharmacy:           



                                                  [place] [address]           

 

     amLODIPine            Yes                      See            [facility]



     5 mg oral      3-21                               Instructio           l



     tablet      15:36:                               ns, TAKE 1           [facility]

nn



               18                                 TABLET BY           



                                                  MOUTH           



                                                  EVERY DAY,           



                                                  # 30 tab,           



                                                  5              



                                                  Refill(s),           



                                                  Pharmacy:           



                                                  [place] [id number]           

 

     amLODIPine            Yes                      See            [facility]



     5 mg oral      3-21                               Instructio           l



     tablet      15:36:                               ns, TAKE 1           [facility]

nn



               18                                 TABLET BY           



                                                  MOUTH           



                                                  EVERY DAY,           



                                                  # 30 tab,           



                                                  5              



                                                  Refill(s),           



                                                  Pharmacy:           



                                                  [place] [address]           

 

     amLODIPine            Yes                      See            [facility]



     5 mg oral      3-21                               Instructio           l



     tablet      15:36:                               ns, TAKE 1           [facility]

nn



               18                                 TABLET BY           



                                                  MOUTH           



                                                  EVERY DAY,           



                                                  # 30 tab,           



                                                  5              



                                                  Refill(s),           



                                                  Pharmacy:           



                                                  [place] [address]           

 

     amLODIPine            Yes                      See            [facility]



     5 mg oral      3-21                               Instructio           l



     tablet      15:36:                               ns, TAKE 1           [facility]

nn



               18                                 TABLET BY           



                                                  MOUTH           



                                                  EVERY DAY,           



                                                  # 30 tab,           



                                                  5              



                                                  Refill(s),           



                                                  Pharmacy:           



                                                  [place] [address]           

 

     amLODIPine            Yes                      See            [facility]



     5 mg oral      3-21                               Instructio           l



     tablet      15:36:                               ns, TAKE 1           [facility]

nn



               18                                 TABLET BY           



                                                  MOUTH           



                                                  EVERY DAY,           



                                                  # 30 tab,           



                                                  5              



                                                  Refill(s),           



                                                  Pharmacy:           



                                                  [facility]           



                                                  [email or web address] [facility]           



                                                  [id number]           

 

     amLODIPine      2018      Yes                      See            [facility]



     5 mg oral      3-21                               Instructio           l



     tablet      15:36:                               ns, TAKE 1           [facility]

nn



               18                                 TABLET BY           



                                                  MOUTH           



                                                  EVERY DAY,           



                                                  # 30 tab,           



                                                  5              



                                                  Refill(s),           



                                                  Pharmacy:           



                                                  [facility]           



                                                  [email or web address]           



                                                  [id number]           

 

     amLODIPine      2018-      Yes                      See            [facility]



     5 mg oral      3-21                               Instructio           l



     tablet      15:36:                               ns, TAKE 1           [facility]

nn



               18                                 TABLET BY           



                                                  MOUTH           



                                                  EVERY DAY,           



                                                  # 30 tab,           



                                                  5              



                                                  Refill(s),           



                                                  Pharmacy:           



                                                  [facility]           



                                                  [email or web address]           



                                                  [id number]           

 

     aspirin 81      2017-      Yes                      81 mg = 1           Me

[facility]



     mg tablet,      1-24                               tab, PO,           l



     enteric      16:34:                               Daily, #           [facility]

n



     coated      00                                 90 tab, 3           



                                                  Refill(s)           

 

     aspirin 81      2017-      Yes                      81 mg = 1           Me

[facility]



     mg tablet,      1-24                               tab, PO,           l



     enteric      16:34:                               Daily, #           [facility]

n



     coated      00                                 90 tab, 3           



                                                  Refill(s)           

 

     aspirin 81      2017      Yes                      81 mg = 1           Me

[facility]



     mg tablet,      1-24                               tab, PO,           l



     enteric      16:34:                               Daily, #           [facility]

n



     coated      00                                 90 tab, 3           



                                                  Refill(s)           

 

     aspirin 81      2017-      Yes                      81 mg = 1           Me

[facility]



     mg tablet,      1-24                               tab, PO,           l



     enteric      16:34:                               Daily, #           [facility]

n



     coated      00                                 90 tab, 3           



                                                  Refill(s)           

 

     aspirin 81      2017-      Yes                      81 mg = 1           Me

[facility]



     mg tablet,      1-24                               tab, PO,           l



     enteric      16:34:                               Daily, #           [facility]

n



     coated      00                                 90 tab, 3           



                                                  Refill(s)           

 

     aspirin 81      2017-      Yes                      81 mg = 1           Me

[facility]



     mg tablet,      1-24                               tab, PO,           l



     enteric      16:34:                               Daily, #           [facility]

n



     coated      00                                 90 tab, 3           



                                                  Refill(s)           

 

     aspirin 81      2017-      Yes                      81 mg = 1           Me

[facility]



     mg tablet,      1-24                               tab, PO,           l



     enteric      16:34:                               Daily, #           [facility]

n



     coated      00                                 90 tab, 3           



                                                  Refill(s)           

 

     aspirin 81      2017-      Yes                      81 mg = 1           Me

[facility]



     mg tablet,      1-24                               tab, PO,           l



     enteric      16:34:                               Daily, #           [facility]

n



     coated      00                                 90 tab, 3           



                                                  Refill(s)           

 

     aspirin 81      2017-0      Yes                      81 mg = 1           Me

[facility]



     mg tablet,      1-24                               tab, PO,           l



     enteric      16:34:                               Daily, #           [facility]

n



     coated      00                                 90 tab, 3           



                                                  Refill(s)           

 

     Xopenex            No   Ghassan K                0.63 mg =           [facility]

[facility]



     0.63 mg/3      3-11           [facility]                3 mL,           l



     mL        01:00:                               Soln, NEB,           [facility]



     inhalation      00                                 Once,           



     solution                                         first dose           



                                                  03/10/16           



                                                  19:00:00           



                                                  CST, stop           



                                                  date           



                                                  03/10/16           



                                                  19:00:00           



                                                  CST            

 

     Xopenex            No   [facility]assan K                0.63 mg =           [facility]

[facility]



     0.63 mg/3      3-11           [facility]                3 mL,           l



     mL        01:00:                               Soln, NEB,           [facility]



     inhalation      00                                 Once,           



     solution                                         first dose           



                                                  03/10/16           



                                                  19:00:00           



                                                  CST, stop           



                                                  date           



                                                  03/10/16           



                                                  19:00:00           



                                                  CST            

 

     Xopenex            No   Ghassan K                0.63 mg =           [facility]

[facility]



     0.63 mg/3      3-11           [facility]                3 mL,           l



     mL        01:00:                               Soln, NEB,           [facility]



     inhalation      00                                 Once,           



     solution                                         first dose           



                                                  03/10/16           



                                                  19:00:00           



                                                  CST, stop           



                                                  date           



                                                  03/10/16           



                                                  19:00:00           



                                                  CST            

 

     Xopenex            No   [facility]an K                0.63 mg =           [facility]

[facility]



     0.63 mg/3      3-11           [facility]                3 mL,           l



     mL        01:00:                               Soln, NEB,           [facility]



     inhalation      00                                 Once,           



     solution                                         first dose           



                                                  03/10/16           



                                                  19:00:00           



                                                  CST, stop           



                                                  date           



                                                  03/10/16           



                                                  19:00:00           



                                                  CST            

 

     Xopenex            No   Ghassan K                0.63 mg =           [facility]

[facility]



     0.63 mg/3      3-11           [facility]                3 mL,           l



     mL        01:00:                               Soln, NEB,           [facility]



     inhalation      00                                 Once,           



     solution                                         first dose           



                                                  03/10/16           



                                                  19:00:00           



                                                  CST, stop           



                                                  date           



                                                  03/10/16           



                                                  19:00:00           



                                                  CST            

 

     Xopenex            No   [facility] K                0.63 mg =           [facility]

[facility]



     0.63 mg/3      3-11           [facility]                3 mL,           l



     mL        01:00:                               Soln, NEB,           [facility] [place]



     inhalation      00                                 Once,           



     solution                                         first dose           



                                                  03/10/16           



                                                  19:00:00           



                                                  CST, stop           



                                                  date           



                                                  03/10/16           



                                                  19:00:00           



                                                  CST            

 

     Xopenex      -0      No   [facility] K                0.63 mg =           [facility]

[facility]



     0.63 mg/3      3-11           [facility]                3 mL,           l



     mL        01:00:                               Soln, NEB,           [facility]



     inhalation      00                                 Once,           



     solution                                         first dose           



                                                  03/10/16           



                                                  19:00:00           



                                                  CST, stop           



                                                  date           



                                                  03/10/16           



                                                  19:00:00           



                                                  CST            

 

     Xopenex      -0      No   [facility] K                0.63 mg =           [facility]

[facility]



     0.63 mg/3      3-11           [facility]                3 mL,           l



     mL        01:00:                               Soln, NEB,           [facility]



     inhalation      00                                 Once,           



     solution                                         first dose           



                                                  03/10/16           



                                                  19:00:00           



                                                  CST, stop           



                                                  date           



                                                  03/10/16           



                                                  19:00:00           



                                                  CST            

 

     Xopenex      -0      No   [facility] K                0.63 mg =           [facility]

[facility]



     0.63 mg/3      3-11           [facility]                3 mL,           l



     mL        01:00:                               Soln, NEB,           [facility]



     inhalation      00                                 Once,           



     solution                                         first dose           



                                                  03/10/16           



                                                  19:00:00           



                                                  CST, stop           



                                                  date           



                                                  03/10/16           



                                                  19:00:00           



                                                  CST            

 

     Misc      0      No   Deuce                300 mL,           [facility]



     Medication      3-11           [facility]                Soln-IV,           l



               00:03:                               IV, Once,           [facility]



               00                                 first dose           



                                                  03/10/16           



                                                  18:03:00           



                                                  CST, stop           



                                                  date           



                                                  03/10/16           



                                                  18:03:00           



                                                  CST            

 

     Misc      0      No   Deuce                300 mL,           [facility]



     Medication      3-11           [facility]heat                Soln-IV,           l



               00:03:                               IV, Once,           [facility]



               00                                 first dose           



                                                  03/10/16           



                                                  18:03:00           



                                                  CST, stop           



                                                  date           



                                                  03/10/16           



                                                  18:03:00           



                                                  CST            

 

     Misc      0      No   Deuce                300 mL,           [facility]



     Medication      3-11           [facility]                Soln-IV,           l



               00:03:                               IV, Once,           [facility]



               00                                 first dose           



                                                  03/10/16           



                                                  18:03:00           



                                                  CST, stop           



                                                  date           



                                                  03/10/16           



                                                  18:03:00           



                                                  CST            

 

     Misc      0      No   Deuce                300 mL,           [facility]



     Medication      3-11           [facility]                Soln-IV,           l



               00:03:                               IV, Once,           [facility]



               00                                 first dose           



                                                  03/10/16           



                                                  18:03:00           



                                                  CST, stop           



                                                  date           



                                                  03/10/16           



                                                  18:03:00           



                                                  CST            

 

     Misc      2016-0      No   Deuce                300 mL,           [facility]



     Medication      3-11           [facility]                Soln-IV,           l



               00:03:                               IV, Once,           [facility]                                 first dose           



                                                  03/10/16           



                                                  18:03:00           



                                                  CST, stop           



                                                  date           



                                                  03/10/16           



                                                  18:03:00           



                                                  CST            

 

     Misc            No   Deuce                300 mL,           [facility]



     Medication      3-11           [facility]                Soln-IV,           l



               00:03:                               IV, Once,           [facility]                                 first dose           



                                                  03/10/16           



                                                  18:03:00           



                                                  CST, stop           



                                                  date           



                                                  03/10/16           



                                                  18:03:00           



                                                  CST            

 

     Misc            No   Deuce                300 mL,           [facility]



     Medication      3-11           [facility]                Soln-IV,           l



               00:03:                               IV, Once,           [facility]                                 first dose           



                                                  03/10/16           



                                                  18:03:00           



                                                  CST, stop           



                                                  date           



                                                  03/10/16           



                                                  18:03:00           



                                                  CST            

 

     Misc            No   Deuce                300 mL,           [facility]



     Medication      3-11           [facility]                Soln-IV,           l



               00:03:                               IV, Once,           [facility]                                 first dose           



                                                  03/10/16           



                                                  18:03:00           



                                                  CST, stop           



                                                  date           



                                                  03/10/16           



                                                  18:03:00           



                                                  CST            

 

     Misc            No   Deuce                300 mL,           [facility]



     Medication      3-11           [facility]                Soln-IV,           l



               00:03:                               IV, Once,           [facility]                                 first dose           



                                                  03/10/16           



                                                  18:03:00           



                                                  CST, stop           



                                                  date           



                                                  03/10/16           



                                                  18:03:00           



                                                  CST            

 

     promethazin            No   Ghassan K                12.5 mg =          

 [facility]



     e         3-11           [facility]                0.5 mL,           l



               00:02:                               Injection,           [facility]



               00                                 IM, Once           



                                                  PRN for           



                                                  severe           



                                                  nausea,           



                                                  first dose           



                                                  03/10/16           



                                                  18:02:00           



                                                  CST            

 

     albuterol            No   Ghassan K                2.5 mg = 3           

[facility]



     2.5 mg/3 mL      3-11           [facility]                mL, Soln,           

l



     (0.083%)      00:02:                               NEB, Once           [facility]

[facility]



     inhalation      00                                 PRN for           



     solution                                         wheezing,           



                                                  first dose           



                                                  03/10/16           



                                                  18:02:00           



                                                  CST            

 

     Demerol HCl            No   Ghassan K                12.5 mg =          

 [facility]



               3-11           [facility]                0.25 mL,           l



               00:02:                               Injection,           [facility]



               00                                 IV Push,           



                                                  Once PRN           



                                                  for            



                                                  shivers,           



                                                  first dose           



                                                  03/10/16           



                                                  18:02:00           



                                                  CST            

 

     ondansetron            No   Ghassan K                4 mg = 2           

[facility]



               3-11           [facility]                mL,            l



               00:02:                               Injection,           [facility]



               00                                 IV Push,           



                                                  q15min PRN           



                                                  for            



                                                  nausea/vom           



                                                  iting,           



                                                  order           



                                                  duration:           



                                                  2 doses,           



                                                  first dose           



                                                  03/10/16           



                                                  18:02:00           



                                                  CST, stop           



                                                  date           



                                                  Limited #           



                                                  of times           

 

     Dilaudid            No   Ghassan K                0.5 mg =           [facility]

[facility]



               3-11           [facility]                0.25 mL,           l



               00:02:                               Injection,           [facility]



               00                                 IV Push,           



                                                  q10min PRN           



                                                  for pain           



                                                  severe           



                                                  (7-10),           



                                                  first dose           



                                                  03/10/16           



                                                  18:02:00           



                                                  CST            

 

     diphenhydrA            No   Ghassan K                25 mg =           [facility]

[facility]



     MINE      3-11           [facility]                0.5 mL,           l



               00:02:                               Injection,           [facility]



               00                                 IV Push,           



                                                  Once PRN           



                                                  for            



                                                  itching,           



                                                  first dose           



                                                  03/10/16           



                                                  18:02:00           



                                                  CST            

 

     LR 1,000 mL            No   Ghassan K                1,000 mL,          

 [facility]



               3-11           [facility]                IV, 75           l



               00:02:                               mL/hr,           [facility]



               00                                 start date           



                                                  03/10/16           



                                                  18:02:00           



                                                  CST            

 

     Saline Lock            No   Ghassan K                10 mL,           [facility]

[facility]



     Flush      3-11           [facility]                Soln, IV           l



               00:02:                               Push, As           [facility]



               00                                 Indicated           



                                                  PRN for           



                                                  flush,           



                                                  first dose           



                                                  03/10/16           



                                                  18:02:00           



                                                  CST            

 

     Bupivacaine            No   Ghassan K                300 mL,           [facility]

[facility]



     0.25% 300      3-11           [facility]                Nerve           l



     mL pump 300      00:02:                               Block, 5           [facility]

[facility]



     mL        00                                 mL/hr,           



                                                  start date           



                                                  03/10/16           



                                                  18:02:00           



                                                  CST            

 

     promethazin            No   Ghassan K                12.5 mg =          

 [facility]



     e         3-11           [facility]                0.5 mL,           l



               00:02:                               Injection,           [facility]



               00                                 IM, Once           



                                                  PRN for           



                                                  severe           



                                                  nausea,           



                                                  first dose           



                                                  03/10/16           



                                                  18:02:00           



                                                  CST            

 

     albuterol            No   Ghassan K                2.5 mg = 3           

[facility]



     2.5 mg/3 mL      3-11           [facility]                mL, Soln,           

l



     (0.083%)      00:02:                               NEB, Once           [facility]

[facility]



     inhalation      00                                 PRN for           



     solution                                         wheezing,           



                                                  first dose           



                                                  03/10/16           



                                                  18:02:00           



                                                  CST            

 

     Demerol HCl            No   Ghassan K                12.5 mg =          

 [facility]



               3-11           [facility]                0.25 mL,           l



               00:02:                               Injection,           [facility]



               00                                 IV Push,           



                                                  Once PRN           



                                                  for            



                                                  shivers,           



                                                  first dose           



                                                  03/10/16           



                                                  18:02:00           



                                                  CST            

 

     ondansetron            No   Ghassan K                4 mg = 2           

[facility]



               3-11           [facility]                mL,            l



               00:02:                               Injection,           [facility]



               00                                 IV Push,           



                                                  q15min PRN           



                                                  for            



                                                  nausea/vom           



                                                  iting,           



                                                  order           



                                                  duration:           



                                                  2 doses,           



                                                  first dose           



                                                  03/10/16           



                                                  18:02:00           



                                                  CST, stop           



                                                  date           



                                                  Limited #           



                                                  of times           

 

     Dilaudid            No   Ghassan K                0.5 mg =           [facility]

[facility]



               3-11           [facility]                0.25 mL,           l



               00:02:                               Injection,           [facility]



               00                                 IV Push,           



                                                  q10min PRN           



                                                  for pain           



                                                  severe           



                                                  (7-10),           



                                                  first dose           



                                                  03/10/16           



                                                  18:02:00           



                                                  CST            

 

     diphenhydrA            No   Ghassan K                25 mg =           [facility]

[facility]



     MINE      3-11           [facility]                0.5 mL,           l



               00:02:                               Injection,           [facility]



               00                                 IV Push,           



                                                  Once PRN           



                                                  for            



                                                  itching,           



                                                  first dose           



                                                  03/10/16           



                                                  18:02:00           



                                                  CST            

 

     LR 1,000 mL            No   Ghassan K                1,000 mL,          

 [facility]



               3-11           [facility]                IV, 75           l



               00:02:                               mL/hr,           [facility]                                 start date           



                                                  03/10/16           



                                                  18:02:00           



                                                  CST            

 

     Saline Lock            No   Ghassan K                10 mL,           [facility]

[facility]



     Flush      3-11           [facility]                Soln, IV           l



               00:02:                               Push, As           [facility]



                                                Indicated           



                                                  PRN for           



                                                  flush,           



                                                  first dose           



                                                  03/10/16           



                                                  18:02:00           



                                                  CST            

 

     Bupivacaine            No   Ghassan K                300 mL,           [facility]

[facility]



     0.25% 300      3-11           [facility]                Nerve           l



     mL pump 300      00:02:                               Block, 5           [facility]

[facility]



     mL        00                                 mL/hr,           



                                                  start date           



                                                  03/10/16           



                                                  18:02:00           



                                                  CST            

 

     promethazin            No   Ghassan K                12.5 mg =          

 [facility]



     e         3-11           [facility]                0.5 mL,           l



               00:02:                               Injection,           [facility]



               00                                 IM, Once           



                                                  PRN for           



                                                  severe           



                                                  nausea,           



                                                  first dose           



                                                  03/10/16           



                                                  18:02:00           



                                                  CST            

 

     albuterol            No   Ghassan K                2.5 mg = 3           

[facility]



     2.5 mg/3 mL      3-11           [facility]                mL, Soln,           

l



     (0.083%)      00:02:                               NEB, Once           [facility]

[facility]



     inhalation      00                                 PRN for           



     solution                                         wheezing,           



                                                  first dose           



                                                  03/10/16           



                                                  18:02:00           



                                                  CST            

 

     Demerol HCl            No   Ghassan K                12.5 mg =          

 [facility]



               3-11           [facility]                0.25 mL,           l



               00:02:                               Injection,           [facility]



               00                                 IV Push,           



                                                  Once PRN           



                                                  for            



                                                  shivers,           



                                                  first dose           



                                                  03/10/16           



                                                  18:02:00           



                                                  CST            

 

     ondansetron            No   Ghassan K                4 mg = 2           

[facility]



               3-11           Chun                mL,            l



               00:02:                               Injection,           [facility]



               00                                 IV Push,           



                                                  q15min PRN           



                                                  for            



                                                  nausea/vom           



                                                  iting,           



                                                  order           



                                                  duration:           



                                                  2 doses,           



                                                  first dose           



                                                  03/10/16           



                                                  18:02:00           



                                                  CST, stop           



                                                  date           



                                                  Limited #           



                                                  of times           

 

     Dilaudid            No   Ghassan K                0.5 mg =           [facility]

[facility]



               3-11           Chun                0.25 mL,           l



               00:02:                               Injection,           [facility]



               00                                 IV Push,           



                                                  q10min PRN           



                                                  for pain           



                                                  severe           



                                                  (7-10),           



                                                  first dose           



                                                  03/10/16           



                                                  18:02:00           



                                                  CST            

 

     diphenhydrA            No   Ghassan K                25 mg =           M

[facility]



     MINE      3-11           Chun                0.5 mL,           l



               00:02:                               Injection,           [facility]



               00                                 IV Push,           



                                                  Once PRN           



                                                  for            



                                                  itching,           



                                                  first dose           



                                                  03/10/16           



                                                  18:02:00           



                                                  CST            

 

     LR 1,000 mL            No   Ghassan K                1,000 mL,          

 [facility]



               3-11           [facility]                IV, 75           l



               00:02:                               mL/hr,           [facility]



               00                                 start date           



                                                  03/10/16           



                                                  18:02:00           



                                                  CST            

 

     Saline Lock            No   Ghassan K                10 mL,           [facility]

[facility]



     Flush      3-11           Chun                Soln, IV           l



               00:02:                               Push, As           [facility]



               00                                 Indicated           



                                                  PRN for           



                                                  flush,           



                                                  first dose           



                                                  03/10/16           



                                                  18:02:00           



                                                  CST            

 

     Bupivacaine            No   Ghassan K                300 mL,           M

[facility]



     0.25% 300      3-11           Chun                Nerve           l



     mL pump 300      00:02:                               Block, 5           [facility]

[facility]



     mL        00                                 mL/hr,           



                                                  start date           



                                                  03/10/16           



                                                  18:02:00           



                                                  CST            

 

     promethazin            No   Ghassan K                12.5 mg =          

 [facility]



     e         3-11           Chun                0.5 mL,           l



               00:02:                               Injection,           [facility]



               00                                 IM, Once           



                                                  PRN for           



                                                  severe           



                                                  nausea,           



                                                  first dose           



                                                  03/10/16           



                                                  18:02:00           



                                                  CST            

 

     albuterol            No   Ghassan K                2.5 mg = 3           

[facility]



     2.5 mg/3 mL      3-11           Chun                mL, Soln,           

l



     (0.083%)      00:02:                               NEB, Once           [facility]

[facility]



     inhalation      00                                 PRN for           



     solution                                         wheezing,           



                                                  first dose           



                                                  03/10/16           



                                                  18:02:00           



                                                  CST            

 

     Demerol HCl            No   Ghassan K                12.5 mg =          

 [facility]



               3-11           Chun                0.25 mL,           l



               00:02:                               Injection,           [facility]



               00                                 IV Push,           



                                                  Once PRN           



                                                  for            



                                                  shivers,           



                                                  first dose           



                                                  03/10/16           



                                                  18:02:00           



                                                  CST            

 

     ondansetron            No   Ghassan K                4 mg = 2           

[facility]



               3-11           Chun                mL,            l



               00:02:                               Injection,           [facility]



               00                                 IV Push,           



                                                  q15min PRN           



                                                  for            



                                                  nausea/vom           



                                                  iting,           



                                                  order           



                                                  duration:           



                                                  2 doses,           



                                                  first dose           



                                                  03/10/16           



                                                  18:02:00           



                                                  CST, stop           



                                                  date           



                                                  Limited #           



                                                  of times           

 

     Dilaudid            No   Ghassan K                0.5 mg =           [facility]

[facility]



               3-11           Chun                0.25 mL,           l



               00:02:                               Injection,           [facility]



               00                                 IV Push,           



                                                  q10min PRN           



                                                  for pain           



                                                  severe           



                                                  (7-10),           



                                                  first dose           



                                                  03/10/16           



                                                  18:02:00           



                                                  CST            

 

     diphenhydrA            No   Ghassan K                25 mg =           M

[facility]



     MINE      3-11           Chun                0.5 mL,           l



               00:02:                               Injection,           [facility]



               00                                 IV Push,           



                                                  Once PRN           



                                                  for            



                                                  itching,           



                                                  first dose           



                                                  03/10/16           



                                                  18:02:00           



                                                  CST            

 

     LR 1,000 mL            No   Ghassan K                1,000 mL,          

 [facility]



               3-11           [facility]un                IV, 75           l



               00:02:                               mL/hr,           [facility]



               00                                 start date           



                                                  03/10/16           



                                                  18:02:00           



                                                  CST            

 

     Saline Lock            No   Ghassan K                10 mL,           [facility]

[facility]



     Flush      3-11           Chun                Soln, IV           l



               00:02:                               Push, As           [facility]



               00                                 Indicated           



                                                  PRN for           



                                                  flush,           



                                                  first dose           



                                                  03/10/16           



                                                  18:02:00           



                                                  CST            

 

     Bupivacaine            No   Ghassan K                300 mL,           [facility]

[facility]



     0.25% 300      3-11           Chun                Nerve           l



     mL pump 300      00:02:                               Block, 5           [facility]

[facility]



     mL        00                                 mL/hr,           



                                                  start date           



                                                  03/10/16           



                                                  18:02:00           



                                                  CST            

 

     promethazin            No   Ghassan K                12.5 mg =          

 [facility]



     e         3-11           Chun                0.5 mL,           l



               00:02:                               Injection,           [facility]



               00                                 IM, Once           



                                                  PRN for           



                                                  severe           



                                                  nausea,           



                                                  first dose           



                                                  03/10/16           



                                                  18:02:00           



                                                  CST            

 

     albuterol            No   Ghassan K                2.5 mg = 3           

[facility]



     2.5 mg/3 mL      3-11           Chun                mL, Soln,           

l



     (0.083%)      00:02:                               NEB, Once           [facility]

[facility]



     inhalation      00                                 PRN for           



     solution                                         wheezing,           



                                                  first dose           



                                                  03/10/16           



                                                  18:02:00           



                                                  CST            

 

     Demerol HCl            No   Ghassan K                12.5 mg =          

 [facility]



               3-11           Chun                0.25 mL,           l



               00:02:                               Injection,           [facility]



               00                                 IV Push,           



                                                  Once PRN           



                                                  for            



                                                  shivers,           



                                                  first dose           



                                                  03/10/16           



                                                  18:02:00           



                                                  CST            

 

     ondansetron            No   Ghassan K                4 mg = 2           

[facility]



               3-11           Chun                mL,            l



               00:02:                               Injection,           [facility]



               00                                 IV Push,           



                                                  q15min PRN           



                                                  for            



                                                  nausea/vom           



                                                  iting,           



                                                  order           



                                                  duration:           



                                                  2 doses,           



                                                  first dose           



                                                  03/10/16           



                                                  18:02:00           



                                                  CST, stop           



                                                  date           



                                                  Limited #           



                                                  of times           

 

     Dilaudid            No   Ghassan K                0.5 mg =           [facility]

[facility]



               3-11           Chun                0.25 mL,           l



               00:02:                               Injection,           [facility]



               00                                 IV Push,           



                                                  q10min PRN           



                                                  for pain           



                                                  severe           



                                                  (7-10),           



                                                  first dose           



                                                  03/10/16           



                                                  18:02:00           



                                                  CST            

 

     diphenhydrA            No   Ghassan K                25 mg =           M

[facility]



     MINE      3-11           Chun                0.5 mL,           l



               00:02:                               Injection,           [facility]



               00                                 IV Push,           



                                                  Once PRN           



                                                  for            



                                                  itching,           



                                                  first dose           



                                                  03/10/16           



                                                  18:02:00           



                                                  CST            

 

     LR 1,000 mL            No   Ghassan K                1,000 mL,          

 [facility]



               3-11           Chun                IV, 75           l



               00:02:                               mL/hr,           [facility]



               00                                 start date           



                                                  03/10/16           



                                                  18:02:00           



                                                  CST            

 

     Saline Lock            No   Ghassan K                10 mL,           Me

[facility]



     Flush      3-11           Chun                Soln, IV           l



               00:02:                               Push, As           [facility]



               00                                 Indicated           



                                                  PRN for           



                                                  flush,           



                                                  first dose           



                                                  03/10/16           



                                                  18:02:00           



                                                  CST            

 

     Bupivacaine            No   Ghassan K                300 mL,           M

[facility]



     0.25% 300      3-11           Chun                Nerve           l



     mL pump 300      00:02:                               Block, 5           [facility]

[facility]



     mL        00                                 mL/hr,           



                                                  start date           



                                                  03/10/16           



                                                  18:02:00           



                                                  CST            

 

     promethazin            No   Ghassan K                12.5 mg =          

 [facility]



     e         3-11           Chun                0.5 mL,           l



               00:02:                               Injection,           [facility]



               00                                 IM, Once           



                                                  PRN for           



                                                  severe           



                                                  nausea,           



                                                  first dose           



                                                  03/10/16           



                                                  18:02:00           



                                                  CST            

 

     albuterol            No   Ghassan K                2.5 mg = 3           

[facility]



     2.5 mg/3 mL      3-11           [facility]                mL, Soln,           

l



     (0.083%)      00:02:                               NEB, Once           [facility]

[facility]



     inhalation      00                                 PRN for           



     solution                                         wheezing,           



                                                  first dose           



                                                  03/10/16           



                                                  18:02:00           



                                                  CST            

 

     Demerol HCl            No   Ghassan K                12.5 mg =          

 [facility]



               3-11           Chun                0.25 mL,           l



               00:02:                               Injection,           [facility]



               00                                 IV Push,           



                                                  Once PRN           



                                                  for            



                                                  shivers,           



                                                  first dose           



                                                  03/10/16           



                                                  18:02:00           



                                                  CST            

 

     ondansetron            No   Ghassan K                4 mg = 2           

[facility]



               3-11           Chun                mL,            l



               00:02:                               Injection,           [facility]



               00                                 IV Push,           



                                                  q15min PRN           



                                                  for            



                                                  nausea/vom           



                                                  iting,           



                                                  order           



                                                  duration:           



                                                  2 doses,           



                                                  first dose           



                                                  03/10/16           



                                                  18:02:00           



                                                  CST, stop           



                                                  date           



                                                  Limited #           



                                                  of times           

 

     Dilaudid            No   Ghassan K                0.5 mg =           [facility]

[facility]



               3-11           Chun                0.25 mL,           l



               00:02:                               Injection,           [facility]



               00                                 IV Push,           



                                                  q10min PRN           



                                                  for pain           



                                                  severe           



                                                  (7-10),           



                                                  first dose           



                                                  03/10/16           



                                                  18:02:00           



                                                  CST            

 

     diphenhydrA            No   Ghassan K                25 mg =           M

[facility]



     MINE      3-11           Chun                0.5 mL,           l



               00:02:                               Injection,           [facility]



               00                                 IV Push,           



                                                  Once PRN           



                                                  for            



                                                  itching,           



                                                  first dose           



                                                  03/10/16           



                                                  18:02:00           



                                                  CST            

 

     LR 1,000 mL            No   Ghassan K                1,000 mL,          

 [facility]



               3-11           Chun                IV, 75           l



               00:02:                               mL/hr,           [facility]



               00                                 start date           



                                                  03/10/16           



                                                  18:02:00           



                                                  CST            

 

     Saline Lock            No   Ghassan K                10 mL,           Me

[facility]



     Flush      3-11           Chun                Soln, IV           l



               00:02:                               Push, As           [facility]



               00                                 Indicated           



                                                  PRN for           



                                                  flush,           



                                                  first dose           



                                                  03/10/16           



                                                  18:02:00           



                                                  CST            

 

     Bupivacaine            No   Ghassan K                300 mL,           M

[facility]



     0.25% 300      3-11           Chun                Nerve           l



     mL pump 300      00:02:                               Block, 5           [facility]

[facility]



     mL        00                                 mL/hr,           



                                                  start date           



                                                  03/10/16           



                                                  18:02:00           



                                                  CST            

 

     promethazin            No   Ghassan K                12.5 mg =          

 [facility]



     e         3-11           Chun                0.5 mL,           l



               00:02:                               Injection,           [facility]



               00                                 IM, Once           



                                                  PRN for           



                                                  severe           



                                                  nausea,           



                                                  first dose           



                                                  03/10/16           



                                                  18:02:00           



                                                  CST            

 

     albuterol            No   Ghassan K                2.5 mg = 3           

[facility]



     2.5 mg/3 mL      3-11           [facility]                mL, Soln,           

l



     (0.083%)      00:02:                               NEB, Once           [facility]

[facility]



     inhalation      00                                 PRN for           



     solution                                         wheezing,           



                                                  first dose           



                                                  03/10/16           



                                                  18:02:00           



                                                  CST            

 

     Demerol HCl            No   Ghassan K                12.5 mg =          

 [facility]



               3-11           [facility]                0.25 mL,           l



               00:02:                               Injection,           [facility]



               00                                 IV Push,           



                                                  Once PRN           



                                                  for            



                                                  shivers,           



                                                  first dose           



                                                  03/10/16           



                                                  18:02:00           



                                                  CST            

 

     ondansetron            No   Ghassan K                4 mg = 2           

[facility]



               3-11           [facility]                mL,            l



               00:02:                               Injection,           [facility]



               00                                 IV Push,           



                                                  q15min PRN           



                                                  for            



                                                  nausea/vom           



                                                  iting,           



                                                  order           



                                                  duration:           



                                                  2 doses,           



                                                  first dose           



                                                  03/10/16           



                                                  18:02:00           



                                                  CST, stop           



                                                  date           



                                                  Limited #           



                                                  of times           

 

     Dilaudid            No   Ghassan K                0.5 mg =           [facility]

[facility]



               3-11           [facility]                0.25 mL,           l



               00:02:                               Injection,           [facility]



               00                                 IV Push,           



                                                  q10min PRN           



                                                  for pain           



                                                  severe           



                                                  (7-10),           



                                                  first dose           



                                                  03/10/16           



                                                  18:02:00           



                                                  CST            

 

     diphenhydrA            No   Ghassan K                25 mg =           M

[facility]



     MINE      3-11           [facility]                0.5 mL,           l



               00:02:                               Injection,           [facility]



               00                                 IV Push,           



                                                  Once PRN           



                                                  for            



                                                  itching,           



                                                  first dose           



                                                  03/10/16           



                                                  18:02:00           



                                                  CST            

 

     LR 1,000 mL            No   Ghassan K                1,000 mL,          

 [facility]



               3-11           [facility]                IV, 75           l



               00:02:                               mL/hr,           [facility]



               00                                 start date           



                                                  03/10/16           



                                                  18:02:00           



                                                  CST            

 

     Saline Lock            No   Ghassan K                10 mL,           Me

[facility]



     Flush      3-11           Chun                Soln, IV           l



               00:02:                               Push, As           [facility]



               00                                 Indicated           



                                                  PRN for           



                                                  flush,           



                                                  first dose           



                                                  03/10/16           



                                                  18:02:00           



                                                  CST            

 

     Bupivacaine            No   Ghassan K                300 mL,           M

[facility]



     0.25% 300      3-11           [facility]                Nerve           l



     mL pump 300      00:02:                               Block, 5           [facility]

[facility]



     mL        00                                 mL/hr,           



                                                  start date           



                                                  03/10/16           



                                                  18:02:00           



                                                  CST            

 

     promethazin            No   Ghassan K                12.5 mg =          

 [facility]



     e         3-11           [facility]                0.5 mL,           l



               00:02:                               Injection,           [facility]



               00                                 IM, Once           



                                                  PRN for           



                                                  severe           



                                                  nausea,           



                                                  first dose           



                                                  03/10/16           



                                                  18:02:00           



                                                  CST            

 

     albuterol            No   Ghassan K                2.5 mg = 3           

[facility]



     2.5 mg/3 mL      3-11           [facility]                mL, Soln,           

l



     (0.083%)      00:02:                               NEB, Once           [facility]

[facility]



     inhalation      00                                 PRN for           



     solution                                         wheezing,           



                                                  first dose           



                                                  03/10/16           



                                                  18:02:00           



                                                  CST            

 

     Demerol HCl            No   Ghassan K                12.5 mg =          

 [facility]



               3-11           [facility]                0.25 mL,           l



               00:02:                               Injection,           [facility]



               00                                 IV Push,           



                                                  Once PRN           



                                                  for            



                                                  shivers,           



                                                  first dose           



                                                  03/10/16           



                                                  18:02:00           



                                                  CST            

 

     ondansetron            No   [facility]assan K                4 mg = 2           

[facility]



               3-11           [facility]                mL,            l



               00:02:                               Injection,           [facility]



               00                                 IV Push,           



                                                  q15min PRN           



                                                  for            



                                                  nausea/vom           



                                                  iting,           



                                                  order           



                                                  duration:           



                                                  2 doses,           



                                                  first dose           



                                                  03/10/16           



                                                  18:02:00           



                                                  CST, stop           



                                                  date           



                                                  Limited #           



                                                  of times           

 

     Dilaudid            No   Ghassan K                0.5 mg =           [facility]

[facility]



               3-11           [facility]                0.25 mL,           l



               00:02:                               Injection,           [facility]



               00                                 IV Push,           



                                                  q10min PRN           



                                                  for pain           



                                                  severe           



                                                  (7-10),           



                                                  first dose           



                                                  03/10/16           



                                                  18:02:00           



                                                  CST            

 

     diphenhydrA            No   Ghassan K                25 mg =           M

[facility]



     MINE      3-11           [facility]                0.5 mL,           l



               00:02:                               Injection,           [facility]



               00                                 IV Push,           



                                                  Once PRN           



                                                  for            



                                                  itching,           



                                                  first dose           



                                                  03/10/16           



                                                  18:02:00           



                                                  CST            

 

     LR 1,000 mL            No   Ghassan K                1,000 mL,          

 [facility]



               3-11           [facility]                IV, 75           l



               00:02:                               mL/hr,           [facility]



               00                                 start date           



                                                  03/10/16           



                                                  18:02:00           



                                                  CST            

 

     Saline Lock            No   Ghassan K                10 mL,           Me

[facility]



     Flush      3-11           [facility]                Soln, IV           l



               00:02:                               Push, As           [facility]



               00                                 Indicated           



                                                  PRN for           



                                                  flush,           



                                                  first dose           



                                                  03/10/16           



                                                  18:02:00           



                                                  CST            

 

     Bupivacaine            No   Ghassan K                300 mL,           M

[facility]



     0.25% 300      3-11           [facility]                Nerve           l



     mL pump 300      00:02:                               Block, 5           [facility]

[facility]



     mL        00                                 mL/hr,           



                                                  start date           



                                                  03/10/16           



                                                  18:02:00           



                                                  CST            

 

     promethazin            No   Ghassan K                12.5 mg =          

 [facility]



     e         3-11           [facility]                0.5 mL,           l



               00:02:                               Injection,           [facility]



               00                                 IM, Once           



                                                  PRN for           



                                                  severe           



                                                  nausea,           



                                                  first dose           



                                                  03/10/16           



                                                  18:02:00           



                                                  CST            

 

     albuterol            No   Ghassan K                2.5 mg = 3           

[facility]



     2.5 mg/3 mL      3-11           [facility]                mL, Soln,           

l



     (0.083%)      00:02:                               NEB, Once           [facility]

[facility]



     inhalation      00                                 PRN for           



     solution                                         wheezing,           



                                                  first dose           



                                                  03/10/16           



                                                  18:02:00           



                                                  CST            

 

     Demerol HCl            No   Ghassan K                12.5 mg =          

 [facility]



               3-11           [facility]                0.25 mL,           l



               00:02:                               Injection,           [facility]



               00                                 IV Push,           



                                                  Once PRN           



                                                  for            



                                                  shivers,           



                                                  first dose           



                                                  03/10/16           



                                                  18:02:00           



                                                  CST            

 

     ondansetron            No   Ghassan K                4 mg = 2           

[facility]



               3-11           [facility]                mL,            l



               00:02:                               Injection,           [facility]



               00                                 IV Push,           



                                                  q15min PRN           



                                                  for            



                                                  nausea/vom           



                                                  iting,           



                                                  order           



                                                  duration:           



                                                  2 doses,           



                                                  first dose           



                                                  03/10/16           



                                                  18:02:00           



                                                  CST, stop           



                                                  date           



                                                  Limited #           



                                                  of times           

 

     Dilaudid            No   Ghassan K                0.5 mg =           [facility]

[facility]



               3-11           [facility]                0.25 mL,           l



               00:02:                               Injection,                                            IV Push,           



                                                  q10min PRN           



                                                  for pain           



                                                  severe           



                                                  (7-10),           



                                                  first dose           



                                                  03/10/16           



                                                  18:02:00           



                                                  CST            

 

     diphenhydrA            No   Ghassan K                25 mg =           M

[facility]



     MINE      3-11           [facility]                0.5 mL,           l



               00:02:                               Injection,                                            IV Push,           



                                                  Once PRN           



                                                  for            



                                                  itching,           



                                                  first dose           



                                                  03/10/16           



                                                  18:02:00           



                                                  CST            

 

     LR 1,000 mL            No   Ghassan K                1,000 mL,          

 [facility]



               3-11           [facility]                IV, 75           l



               00:02:                               mL/hr,                                            start date           



                                                  03/10/16           



                                                  18:02:00           



                                                  CST            

 

     Saline Lock            No   Ghassan K                10 mL,           Me

[facility]



     Flush      3-11           Chun                Soln, IV           l



               00:02:                               Push, As                                            Indicated           



                                                  PRN for           



                                                  flush,           



                                                  first dose           



                                                  03/10/16           



                                                  18:02:00           



                                                  CST            

 

     Bupivacaine            No   Ghassan K                300 mL,           M

[facility]



     0.25% 300      3-11           [facility]                Nerve           l



     mL pump 300      00:02:                               Block, 5           [facility]

[facility]



     mL        00                                 mL/hr,           



                                                  start date           



                                                  03/10/16           



                                                  18:02:00           



                                                  CST            

 

     Misc            No   Deuce                1,000 mL,           [facility]

a



     Medication      3-10           [facility]                Soln-IV,           l



               23:42:                               IV, Once,                                            first dose           



                                                  03/10/16           



                                                  17:42:00           



                                                  CST, stop           



                                                  date           



                                                  03/10/16           



                                                  17:42:00           



                                                  CST            

 

     Misc            No   Deuce                1,000 mL,           [facility]

a



     Medication      3-10           [facility]                Soln-IV,           l



               23:42:                               IV, Once,                                            first dose           



                                                  03/10/16           



                                                  17:42:00           



                                                  CST, stop           



                                                  date           



                                                  03/10/16           



                                                  17:42:00           



                                                  CST            

 

     Misc            No   Deuce                1,000 mL,           [facility]

a



     Medication      3-10           [facility]                Soln-IV,           l



               23:42:                               IV, Once,                                            first dose           



                                                  03/10/16           



                                                  17:42:00           



                                                  CST, stop           



                                                  date           



                                                  03/10/16           



                                                  17:42:00           



                                                  CST            

 

     Misc      0      No   Deuce                1,000 mL,           [facility]

a



     Medication      3-10           [facility]                Soln-IV,           l



               23:42:                               IV, Once,           [facility]



               00                                 first dose           



                                                  03/10/16           



                                                  17:42:00           



                                                  CST, stop           



                                                  date           



                                                  03/10/16           



                                                  17:42:00           



                                                  CST            

 

     Misc            No   Deuce                1,000 mL,           [facility]

a



     Medication      3-10           [facility]                Soln-IV,           l



               23:42:                               IV, Once,           [facility]



               00                                 first dose           



                                                  03/10/16           



                                                  17:42:00           



                                                  CST, stop           



                                                  date           



                                                  03/10/16           



                                                  17:42:00           



                                                  CST            

 

     Misc            No   Deuce                1,000 mL,           [facility]

a



     Medication      3-10           [facility]                Soln-IV,           l



               23:42:                               IV, Once,           [facility]



               00                                 first dose           



                                                  03/10/16           



                                                  17:42:00           



                                                  CST, stop           



                                                  date           



                                                  03/10/16           



                                                  17:42:00           



                                                  CST            

 

     Misc            No   Deuce                1,000 mL,           [facility]

a



     Medication      3-10           [facility]                Soln-IV,           l



               23:42:                               IV, Once,           [facility]



               00                                 first dose           



                                                  03/10/16           



                                                  17:42:00           



                                                  CST, stop           



                                                  date           



                                                  03/10/16           



                                                  17:42:00           



                                                  CST            

 

     Misc            No   Deuce                1,000 mL,           [facility]

a



     Medication      3-10           [facility]                Soln-IV,           l



               23:42:                               IV, Once,           [facility]



               00                                 first dose           



                                                  03/10/16           



                                                  17:42:00           



                                                  CST, stop           



                                                  date           



                                                  03/10/16           



                                                  17:42:00           



                                                  CST            

 

     Misc      0      No   Deuce                1,000 mL,           [facility]

a



     Medication      3-10           [facility]                Soln-IV,           l



               23:42:                               IV, Once,           [facility]



               00                                 first dose           



                                                  03/10/16           



                                                  17:42:00           



                                                  CST, stop           



                                                  date           



                                                  03/10/16           



                                                  17:42:00           



                                                  CST            

 

     fentaNYL      -0      No   Deuce                25 mcg =           [facility]

[facility]



               3-10           [facility]                0.5 mL,           l



               23:20:                               Injection,           [facility]



               00                                 IV, Once,           



                                                  first dose           



                                                  03/10/16           



                                                  17:20:00           



                                                  CST, stop           



                                                  date           



                                                  03/10/16           



                                                  17:20:00           



                                                  CST            

 

     fentaNYL      -0      No   Deuce                25 mcg =           [facility]

[facility]



               3-10           [facility]                0.5 mL,           l



               23:20:                               Injection,           [facility]



               00                                 IV, Once,           



                                                  first dose           



                                                  03/10/16           



                                                  17:20:00           



                                                  CST, stop           



                                                  date           



                                                  03/10/16           



                                                  17:20:00           



                                                  CST            

 

     fentaNYL            No   Deuce                25 mcg =           [facility]

[facility]



               3-10           [facility]                0.5 mL,           l



               23:20:                               Injection,           [facility]



               00                                 IV, Once,           



                                                  first dose           



                                                  03/10/16           



                                                  17:20:00           



                                                  CST, stop           



                                                  date           



                                                  03/10/16           



                                                  17:20:00           



                                                  CST            

 

     fentaNYL            No   Deuce                25 mcg =           [facility]

[facility]



               3-10           [facility]                0.5 mL,           l



               23:20:                               Injection,           [facility]



               00                                 IV, Once,           



                                                  first dose           



                                                  03/10/16           



                                                  17:20:00           



                                                  CST, stop           



                                                  date           



                                                  03/10/16           



                                                  17:20:00           



                                                  CST            

 

     fentaNYL            No   Deuce                25 mcg =           [facility]

[facility]



               3-10           [facility]                0.5 mL,           l



               23:20:                               Injection,           [facility]



               00                                 IV, Once,           



                                                  first dose           



                                                  03/10/16           



                                                  17:20:00           



                                                  CST, stop           



                                                  date           



                                                  03/10/16           



                                                  17:20:00           



                                                  CST            

 

     fentaNYL            No   Deuce                25 mcg =           [facility]

[facility]



               3-10           [facility]                0.5 mL,           l



               23:20:                               Injection,           [facility]



               00                                 IV, Once,           



                                                  first dose           



                                                  03/10/16           



                                                  17:20:00           



                                                  CST, stop           



                                                  date           



                                                  03/10/16           



                                                  17:20:00           



                                                  CST            

 

     fentaNYL            No   Deuce                25 mcg =           [facility]

[facility]



               3-10           [facility]                0.5 mL,           l



               23:20:                               Injection,           [facility]



               00                                 IV, Once,           



                                                  first dose           



                                                  03/10/16           



                                                  17:20:00           



                                                  CST, stop           



                                                  date           



                                                  03/10/16           



                                                  17:20:00           



                                                  CST            

 

     fentaNYL            No   Deuce                25 mcg =           [facility]

[facility]



               3-10           [facility]                0.5 mL,           l



               23:20:                               Injection,           [facility]



               00                                 IV, Once,           



                                                  first dose           



                                                  03/10/16           



                                                  17:20:00           



                                                  CST, stop           



                                                  date           



                                                  03/10/16           



                                                  17:20:00           



                                                  CST            

 

     fentaNYL            No   Deuce                25 mcg =           [facility]

[facility]



               3-10           [facility]                0.5 mL,           l



               23:20:                               Injection,           [facility]



               00                                 IV, Once,           



                                                  first dose           



                                                  03/10/16           



                                                  17:20:00           



                                                  CST, stop           



                                                  date           



                                                  03/10/16           



                                                  17:20:00           



                                                  CST            

 

     ondansetron      2016-0      No   Deuce                4 mg = 2           

[facility]



               3-10           [facility]                mL,            l



               23:03:                               Injection,           [facility]



               00                                 IV, Once,           



                                                  first dose           



                                                  03/10/16           



                                                  17:03:00           



                                                  CST, stop           



                                                  date           



                                                  03/10/16           



                                                  17:03:00           



                                                  CST            

 

     ondansetron      2016-0      No   Deuce                4 mg = 2           

[facility]



               3-10           [facility]                mL,            l



               23:03:                               Injection,           [facility]



               00                                 IV, Once,           



                                                  first dose           



                                                  03/10/16           



                                                  17:03:00           



                                                  CST, stop           



                                                  date           



                                                  03/10/16           



                                                  17:03:00           



                                                  CST            

 

     ondansetron      2016-0      No   Deuce                4 mg = 2           

[facility]



               3-10           [facility]                mL,            l



               23:03:                               Injection,           [facility]



               00                                 IV, Once,           



                                                  first dose           



                                                  03/10/16           



                                                  17:03:00           



                                                  CST, stop           



                                                  date           



                                                  03/10/16           



                                                  17:03:00           



                                                  CST            

 

     ondansetron      2016-0      No   Deuce                4 mg = 2           

[facility]



               3-10           [facility]                mL,            l



               23:03:                               Injection,           [facility]



               00                                 IV, Once,           



                                                  first dose           



                                                  03/10/16           



                                                  17:03:00           



                                                  CST, stop           



                                                  date           



                                                  03/10/16           



                                                  17:03:00           



                                                  CST            

 

     ondansetron      2016-0      No   Deuce                4 mg = 2           

[facility]



               3-10           [facility]                mL,            l



               23:03:                               Injection,           [facility]



               00                                 IV, Once,           



                                                  first dose           



                                                  03/10/16           



                                                  17:03:00           



                                                  CST, stop           



                                                  date           



                                                  03/10/16           



                                                  17:03:00           



                                                  CST            

 

     ondansetron      2016-0      No   Deuce                4 mg = 2           

[facility]



               3-10           [facility]                mL,            l



               23:03:                               Injection,           [facility]



               00                                 IV, Once,           



                                                  first dose           



                                                  03/10/16           



                                                  17:03:00           



                                                  CST, stop           



                                                  date           



                                                  03/10/16           



                                                  17:03:00           



                                                  CST            

 

     ondansetron      2016-0      No   Deuce                4 mg = 2           

[facility]



               3-10           [facility]                mL,            l



               23:03:                               Injection,           [facility]



               00                                 IV, Once,           



                                                  first dose           



                                                  03/10/16           



                                                  17:03:00           



                                                  CST, stop           



                                                  date           



                                                  03/10/16           



                                                  17:03:00           



                                                  CST            

 

     ondansetron      2016-0      No   Deuce                4 mg = 2           

[facility]



               3-10           [facility]                mL,            l



               23:03:                               Injection,           [facility]



               00                                 IV, Once,           



                                                  first dose           



                                                  03/10/16           



                                                  17:03:00           



                                                  CST, stop           



                                                  date           



                                                  03/10/16           



                                                  17:03:00           



                                                  CST            

 

     ondansetron      2016-0      No   Deuce                4 mg = 2           

[facility]



               3-10           [facility]                mL,            l



               23:03:                               Injection,           [facility]



               00                                 IV, Once,           



                                                  first dose           



                                                  03/10/16           



                                                  17:03:00           



                                                  CST, stop           



                                                  date           



                                                  03/10/16           



                                                  17:03:00           



                                                  CST            

 

     fentaNYL      2016-0      No   Deuce                25 mcg =           [facility]

[facility]



               3-10           [facility]                0.5 mL,           l



               23:00:                               Injection,           [facility]



               00                                 IV, Once,           



                                                  first dose           



                                                  03/10/16           



                                                  17:00:00           



                                                  CST, stop           



                                                  date           



                                                  03/10/16           



                                                  17:00:00           



                                                  CST            

 

     fentaNYL      2016-0      No   Deuce                25 mcg =           [facility]

[facility]



               3-10           [facility]                0.5 mL,           l



               23:00:                               Injection,           [facility]



               00                                 IV, Once,           



                                                  first dose           



                                                  03/10/16           



                                                  17:00:00           



                                                  CST, stop           



                                                  date           



                                                  03/10/16           



                                                  17:00:00           



                                                  CST            

 

     fentaNYL      2016-0      No   Deuce                25 mcg =           [facility]

[facility]



               3-10           [facility]                0.5 mL,           l



               23:00:                               Injection,           [facility]



               00                                 IV, Once,           



                                                  first dose           



                                                  03/10/16           



                                                  17:00:00           



                                                  CST, stop           



                                                  date           



                                                  03/10/16           



                                                  17:00:00           



                                                  CST            

 

     fentaNYL      2016-0      No   Deuce                25 mcg =           [facility]

[facility]



               3-10           [facility]                0.5 mL,           l



               23:00:                               Injection,           [facility]



               00                                 IV, Once,           



                                                  first dose           



                                                  03/10/16           



                                                  17:00:00           



                                                  CST, stop           



                                                  date           



                                                  03/10/16           



                                                  17:00:00           



                                                  CST            

 

     fentaNYL      2016-0      No   Deuce                25 mcg =           [facility]

[facility]



               3-10           [facility]                0.5 mL,           l



               23:00:                               Injection,           [facility]



               00                                 IV, Once,           



                                                  first dose           



                                                  03/10/16           



                                                  17:00:00           



                                                  CST, stop           



                                                  date           



                                                  03/10/16           



                                                  17:00:00           



                                                  CST            

 

     fentaNYL      2016-0      No   Deuce                25 mcg =           [facility]

[facility]



               3-10           [place]                0.5 mL,           l



               23:00:                               Injection,           [facility]



               00                                 IV, Once,           



                                                  first dose           



                                                  03/10/16           



                                                  17:00:00           



                                                  CST, stop           



                                                  date           



                                                  03/10/16           



                                                  17:00:00           



                                                  CST            

 

     fentaNYL      2016-0      No   Deuce                25 mcg =           [facility]

[facility]



               3-10           [facility]                0.5 mL,           l



               23:00:                               Injection,           [facility]



               00                                 IV, Once,           



                                                  first dose           



                                                  03/10/16           



                                                  17:00:00           



                                                  CST, stop           



                                                  date           



                                                  03/10/16           



                                                  17:00:00           



                                                  CST            

 

     fentaNYL      -0      No   Deuce                25 mcg =           [facility]

[facility]



               3-10           [facility]                0.5 mL,           l



               23:00:                               Injection,           [facility]



               00                                 IV, Once,           



                                                  first dose           



                                                  03/10/16           



                                                  17:00:00           



                                                  CST, stop           



                                                  date           



                                                  03/10/16           



                                                  17:00:00           



                                                  CST            

 

     fentaNYL      -0      No   Deuce                25 mcg =           [facility]

[facility]



               3-10           [facility]                0.5 mL,           l



               23:00:                               Injection,           [facility]



               00                                 IV, Once,           



                                                  first dose           



                                                  03/10/16           



                                                  17:00:00           



                                                  CST, stop           



                                                  date           



                                                  03/10/16           



                                                  17:00:00           



                                                  CST            

 

     fentaNYL      -0      No   Deuce                25 mcg =           [facility]

[facility]



               3-10           [facility]                0.5 mL,           l



               22:48:                               Injection,           [facility]



               00                                 IV, Once,           



                                                  first dose           



                                                  03/10/16           



                                                  16:48:00           



                                                  CST, stop           



                                                  date           



                                                  03/10/16           



                                                  16:48:00           



                                                  CST            

 

     fentaNYL      -0      No   Deuce                25 mcg =           [facility]

[facility]



               3-10           [facility]                0.5 mL,           l



               22:48:                               Injection,           [facility]



               00                                 IV, Once,           



                                                  first dose           



                                                  03/10/16           



                                                  16:48:00           



                                                  CST, stop           



                                                  date           



                                                  03/10/16           



                                                  16:48:00           



                                                  CST            

 

     fentaNYL      -0      No   Deuce                25 mcg =           [facility]

[facility]



               3-10           [facility]                0.5 mL,           l



               22:48:                               Injection,           [facility]



               00                                 IV, Once,           



                                                  first dose           



                                                  03/10/16           



                                                  16:48:00           



                                                  CST, stop           



                                                  date           



                                                  03/10/16           



                                                  16:48:00           



                                                  CST            

 

     fentaNYL      -0      No   Deuce                25 mcg =           [facility]

[facility]



               3-10           [facility]                0.5 mL,           l



               22:48:                               Injection,           [facility]



               00                                 IV, Once,           



                                                  first dose           



                                                  03/10/16           



                                                  16:48:00           



                                                  CST, stop           



                                                  date           



                                                  03/10/16           



                                                  16:48:00           



                                                  CST            

 

     fentaNYL      -0      No   Deuce                25 mcg =           [facility]

[facility]



               3-10           [facility]                0.5 mL,           l



               22:48:                               Injection,           [facility]



               00                                 IV, Once,           



                                                  first dose           



                                                  03/10/16           



                                                  16:48:00           



                                                  CST, stop           



                                                  date           



                                                  03/10/16           



                                                  16:48:00           



                                                  CST            

 

     fentaNYL      -0      No   Deuce                25 mcg =           [facility]

[facility]



               3-10           [facility]                0.5 mL,           l



               22:48:                               Injection,           [facility]



               00                                 IV, Once,           



                                                  first dose           



                                                  03/10/16           



                                                  16:48:00           



                                                  CST, stop           



                                                  date           



                                                  03/10/16           



                                                  16:48:00           



                                                  CST            

 

     fentaNYL      2016-0      No   Deuce                25 mcg =           [facility]

[facility]



               3-10           [facility]                0.5 mL,           l



               22:48:                               Injection,           [facility]



               00                                 IV, Once,           



                                                  first dose           



                                                  03/10/16           



                                                  16:48:00           



                                                  CST, stop           



                                                  date           



                                                  03/10/16           



                                                  16:48:00           



                                                  CST            

 

     fentaNYL      -0      No   Deuce                25 mcg =           [facility]

[facility]



               3-10           [facility]                0.5 mL,           l



               22:48:                               Injection,           [facility]



               00                                 IV, Once,           



                                                  first dose           



                                                  03/10/16           



                                                  16:48:00           



                                                  CST, stop           



                                                  date           



                                                  03/10/16           



                                                  16:48:00           



                                                  CST            

 

     fentaNYL      -0      No   Deuce                25 mcg =           [facility]

[facility]



               3-10           [place]                0.5 mL,           l



               22:48:                               Injection,           [facility]



               00                                 IV, Once,           



                                                  first dose           



                                                  03/10/16           



                                                  16:48:00           



                                                  CST, stop           



                                                  date           



                                                  03/10/16           



                                                  16:48:00           



                                                  CST            

 

     fentaNYL      -0      No   Deuce                25 mcg =           [facility]

[facility]



               3-10           [facility]                0.5 mL,           l



               22:37:                               Injection,           [facility]



               00                                 IV, Once,           



                                                  first dose           



                                                  03/10/16           



                                                  16:37:00           



                                                  CST, stop           



                                                  date           



                                                  03/10/16           



                                                  16:37:00           



                                                  CST            

 

     fentaNYL      -0      No   Deuce                25 mcg =           [facility]

[facility]



               3-10           [facility]                0.5 mL,           l



               22:37:                               Injection,           [facility]



               00                                 IV, Once,           



                                                  first dose           



                                                  03/10/16           



                                                  16:37:00           



                                                  CST, stop           



                                                  date           



                                                  03/10/16           



                                                  16:37:00           



                                                  CST            

 

     fentaNYL      -0      No   Deuce                25 mcg =           [facility]

[facility]



               3-10           [facility]                0.5 mL,           l



               22:37:                               Injection,           [facility]



               00                                 IV, Once,           



                                                  first dose           



                                                  03/10/16           



                                                  16:37:00           



                                                  CST, stop           



                                                  date           



                                                  03/10/16           



                                                  16:37:00           



                                                  CST            

 

     fentaNYL      -0      No   Deuce                25 mcg =           [facility]

[facility]



               3-10           [facility]                0.5 mL,           l



               22:37:                               Injection,           [facility]



               00                                 IV, Once,           



                                                  first dose           



                                                  03/10/16           



                                                  16:37:00           



                                                  CST, stop           



                                                  date           



                                                  03/10/16           



                                                  16:37:00           



                                                  CST            

 

     fentaNYL      -0      No   Deuce                25 mcg =           [facility]

[facility]



               3-10           [place]                0.5 mL,           l



               22:37:                               Injection,           [facility]



               00                                 IV, Once,           



                                                  first dose           



                                                  03/10/16           



                                                  16:37:00           



                                                  CST, stop           



                                                  date           



                                                  03/10/16           



                                                  16:37:00           



                                                  CST            

 

     fentaNYL      2016-0      No   Deuce                25 mcg =           [facility]

[facility]



               3-10           [place]                0.5 mL,           l



               22:37:                               Injection,           [facility]



               00                                 IV, Once,           



                                                  first dose           



                                                  03/10/16           



                                                  16:37:00           



                                                  CST, stop           



                                                  date           



                                                  03/10/16           



                                                  16:37:00           



                                                  CST            

 

     fentaNYL      2016-0      No   Deuce                25 mcg =           [facility]

[facility]



               3-10           [facility]                0.5 mL,           l



               22:37:                               Injection,           [facility]



               00                                 IV, Once,           



                                                  first dose           



                                                  03/10/16           



                                                  16:37:00           



                                                  CST, stop           



                                                  date           



                                                  03/10/16           



                                                  16:37:00           



                                                  CST            

 

     fentaNYL      2016-0      No   Deuce                25 mcg =           [facility]

[facility]



               3-10           [place]                0.5 mL,           l



               22:37:                               Injection,           [facility]



               00                                 IV, Once,           



                                                  first dose           



                                                  03/10/16           



                                                  16:37:00           



                                                  CST, stop           



                                                  date           



                                                  03/10/16           



                                                  16:37:00           



                                                  CST            

 

     fentaNYL      2016-0      No   Deuce                25 mcg =           [facility]

[facility]



               3-10           [facility]                0.5 mL,           l



               22:37:                               Injection,           [facility]



               00                                 IV, Once,           



                                                  first dose           



                                                  03/10/16           



                                                  16:37:00           



                                                  CST, stop           



                                                  date           



                                                  03/10/16           



                                                  16:37:00           



                                                  CST            

 

     dexamethaso      2016-0      No   [facility]                8 mg = 2           

[facility]



     ne        3-10           [facility]                mL,            l



               22:23:                               Injection,           [facility]



               00                                 IV, Once,           



                                                  first dose           



                                                  03/10/16           



                                                  16:23:00           



                                                  CST, stop           



                                                  date           



                                                  03/10/16           



                                                  16:23:00           



                                                  CST            

 

     dexamethaso      2016-0      No   Deuce                8 mg = 2           

[facility]



     ne        3-10           [facility]                mL,            l



               22:23:                               Injection,           [facility]



               00                                 IV, Once,           



                                                  first dose           



                                                  03/10/16           



                                                  16:23:00           



                                                  CST, stop           



                                                  date           



                                                  03/10/16           



                                                  16:23:00           



                                                  CST            

 

     dexamethaso      2016-0      No   Deuce                8 mg = 2           

[facility]



     ne        3-10           [facility]                mL,            l



               22:23:                               Injection,           [facility]



               00                                 IV, Once,           



                                                  first dose           



                                                  03/10/16           



                                                  16:23:00           



                                                  CST, stop           



                                                  date           



                                                  03/10/16           



                                                  16:23:00           



                                                  CST            

 

     dexamethaso      2016-0      No   Deuce                8 mg = 2           

[facility]



     ne        3-10           [facility]                mL,            l



               22:23:                               Injection,           [facility]



               00                                 IV, Once,           



                                                  first dose           



                                                  03/10/16           



                                                  16:23:00           



                                                  CST, stop           



                                                  date           



                                                  03/10/16           



                                                  16:23:00           



                                                  CST            

 

     dexamethaso      2016-0      No   Deuce                8 mg = 2           

[facility]



     ne        3-10           [facility]                mL,            l



               22:23:                               Injection,           [facility]



               00                                 IV, Once,           



                                                  first dose           



                                                  03/10/16           



                                                  16:23:00           



                                                  CST, stop           



                                                  date           



                                                  03/10/16           



                                                  16:23:00           



                                                  CST            

 

     dexamethaso      2016-0      No   [place]                8 mg = 2           

[facility]



     ne        3-10           [facility]                mL,            l



               22:23:                               Injection,           [facility]



               00                                 IV, Once,           



                                                  first dose           



                                                  03/10/16           



                                                  16:23:00           



                                                  CST, stop           



                                                  date           



                                                  03/10/16           



                                                  16:23:00           



                                                  CST            

 

     dexamethaso      2016-0      No   Deuce                8 mg = 2           

[facility]



     ne        3-10           [facility]                mL,            l



               22:23:                               Injection,           [facility]



               00                                 IV, Once,           



                                                  first dose           



                                                  03/10/16           



                                                  16:23:00           



                                                  CST, stop           



                                                  date           



                                                  03/10/16           



                                                  16:23:00           



                                                  CST            

 

     dexamethaso      2016-0      No   Deuce                8 mg = 2           

[facility]



     ne        3-10           [facility]                mL,            l



               22:23:                               Injection,           [facility]



               00                                 IV, Once,           



                                                  first dose           



                                                  03/10/16           



                                                  16:23:00           



                                                  CST, stop           



                                                  date           



                                                  03/10/16           



                                                  16:23:00           



                                                  CST            

 

     dexamethaso      2016-0      No   Deuce                8 mg = 2           

[facility]



     ne        3-10           [facility]                mL,            l



               22:23:                               Injection,           [facility]



               00                                 IV, Once,           



                                                  first dose           



                                                  03/10/16           



                                                  16:23:00           



                                                  CST, stop           



                                                  date           



                                                  03/10/16           



                                                  16:23:00           



                                                  CST            

 

     Misc      2016-0      No   Deuce                1,000 mL,           [facility]

a



     Medication      3-10           [facility]                Soln-IV,           l



               22:21:                               IV, Once,           [facility]                                 first dose           



                                                  03/10/16           



                                                  16:21:00           



                                                  CST, stop           



                                                  date           



                                                  03/10/16           



                                                  16:21:00           



                                                  CST            

 

     Misc      2016-0      No   Deuce                1,000 mL,           [facility]

a



     Medication      3-10           [facility]                Soln-IV,           l



               22:21:                               IV, Once,           [facility]                                 first dose           



                                                  03/10/16           



                                                  16:21:00           



                                                  CST, stop           



                                                  date           



                                                  03/10/16           



                                                  16:21:00           



                                                  CST            

 

     Misc      2016-0      No   Deuce                1,000 mL,           [facility]

a



     Medication      3-10           [facility]                Soln-IV,           l



               22:21:                               IV, Once,           [facility]



               00                                 first dose           



                                                  03/10/16           



                                                  16:21:00           



                                                  CST, stop           



                                                  date           



                                                  03/10/16           



                                                  16:21:00           



                                                  CST            

 

     Misc      -0      No   Deuce                1,000 mL,           [facility]

a



     Medication      3-10           [facility]                Soln-IV,           l



               22:21:                               IV, Once,           [facility]



               00                                 first dose           



                                                  03/10/16           



                                                  16:21:00           



                                                  CST, stop           



                                                  date           



                                                  03/10/16           



                                                  16:21:00           



                                                  CST            

 

     Misc      -0      No   Deuce                1,000 mL,           [facility]

a



     Medication      3-10           [facility]                Soln-IV,           l



               22:21:                               IV, Once,           [facility]



               00                                 first dose           



                                                  03/10/16           



                                                  16:21:00           



                                                  CST, stop           



                                                  date           



                                                  03/10/16           



                                                  16:21:00           



                                                  CST            

 

     Misc      0      No   Deuce                1,000 mL,           [facility]

a



     Medication      3-10           [facility]                Soln-IV,           l



               22:21:                               IV, Once,           [facility]



               00                                 first dose           



                                                  03/10/16           



                                                  16:21:00           



                                                  CST, stop           



                                                  date           



                                                  03/10/16           



                                                  16:21:00           



                                                  CST            

 

     Misc      -0      No   Deuce                1,000 mL,           [facility]

a



     Medication      3-10           [facility]                Soln-IV,           l



               22:21:                               IV, Once,           [facility]



               00                                 first dose           



                                                  03/10/16           



                                                  16:21:00           



                                                  CST, stop           



                                                  date           



                                                  03/10/16           



                                                  16:21:00           



                                                  CST            

 

     Misc      0      No   Deuce                1,000 mL,           [facility]

a



     Medication      3-10           [facility]                Soln-IV,           l



               22:21:                               IV, Once,           [facility]



               00                                 first dose           



                                                  03/10/16           



                                                  16:21:00           



                                                  CST, stop           



                                                  date           



                                                  03/10/16           



                                                  16:21:00           



                                                  CST            

 

     Misc      -0      No   Deuce                1,000 mL,           [facility]

a



     Medication      3-10           [facility]                Soln-IV,           l



               22:21:                               IV, Once,           [facility]



               00                                 first dose           



                                                  03/10/16           



                                                  16:21:00           



                                                  CST, stop           



                                                  date           



                                                  03/10/16           



                                                  16:21:00           



                                                  CST            

 

     clindamycin      -0      Yes  Deuce                928.125           [facility]

[facility]



               3-10           [facility]                mg,            l



               22:18:                               Soln-IV,           [facility]



               00                                 IV, Once,           



                                                  first dose           



                                                  03/10/16           



                                                  16:18:00           



                                                  CST, stop           



                                                  date           



                                                  03/10/16           



                                                  16:18:00           



                                                  CST            

 

     clindamycin      -0      Yes  Deuce                928.125           [facility]

[facility]



               3-10           [facility]                mg,            l



               22:18:                               Soln-IV,           [facility]



               00                                 IV, Once,           



                                                  first dose           



                                                  03/10/16           



                                                  16:18:00           



                                                  CST, stop           



                                                  date           



                                                  03/10/16           



                                                  16:18:00           



                                                  CST            

 

     clindamycin      -0      Yes  Deuce                928.125           [facility]

[facility]



               3-10           [facility]                mg,            l



               22:18:                               Soln-IV,           [facility]



               00                                 IV, Once,           



                                                  first dose           



                                                  03/10/16           



                                                  16:18:00           



                                                  CST, stop           



                                                  date           



                                                  03/10/16           



                                                  16:18:00           



                                                  CST            

 

     clindamycin      -0      Yes  Deuce                928.125           [facility]

[facility]



               3-10           [facility]                mg,            l



               22:18:                               Soln-IV,           [facility]



               00                                 IV, Once,           



                                                  first dose           



                                                  03/10/16           



                                                  16:18:00           



                                                  CST, stop           



                                                  date           



                                                  03/10/16           



                                                  16:18:00           



                                                  CST            

 

     clindamycin      -0      Yes  Deuce                928.125           [facility]

[facility]



               3-10           [facility]                mg,            l



               22:18:                               Soln-IV,           [facility]



               00                                 IV, Once,           



                                                  first dose           



                                                  03/10/16           



                                                  16:18:00           



                                                  CST, stop           



                                                  date           



                                                  03/10/16           



                                                  16:18:00           



                                                  CST            

 

     clindamycin      -0      Yes  Deuce                928.125           [facility]

[facility]



               3-10           [facility]                mg,            l



               22:18:                               Soln-IV,           [facility]



               00                                 IV, Once,           



                                                  first dose           



                                                  03/10/16           



                                                  16:18:00           



                                                  CST, stop           



                                                  date           



                                                  03/10/16           



                                                  16:18:00           



                                                  CST            

 

     clindamycin      -0      Yes  Deuce                928.125           [facility]

[facility]



               3-10           [facility]                mg,            l



               22:18:                               Soln-IV,           [facility]



               00                                 IV, Once,           



                                                  first dose           



                                                  03/10/16           



                                                  16:18:00           



                                                  CST, stop           



                                                  date           



                                                  03/10/16           



                                                  16:18:00           



                                                  CST            

 

     clindamycin      -0      Yes  Deuce                928.125           [facility]

[facility]



               3-10           [facility]                mg,            l



               22:18:                               Soln-IV,           [facility]



               00                                 IV, Once,           



                                                  first dose           



                                                  03/10/16           



                                                  16:18:00           



                                                  CST, stop           



                                                  date           



                                                  03/10/16           



                                                  16:18:00           



                                                  CST            

 

     clindamycin      -0      Yes  Deuce                928.125           [facility]

[facility]



               3-10           [facility]                mg,            l



               22:18:                               Soln-IV,           [facility]



               00                                 IV, Once,           



                                                  first dose           



                                                  03/10/16           



                                                  16:18:00           



                                                  CST, stop           



                                                  date           



                                                  03/10/16           



                                                  16:18:00           



                                                  CST            

 

     fentaNYL            No   Deuce                25 mcg =           [facility]

[facility]



               3-10           [facility]                0.5 mL,           l



               22:05:                               Injection,           [facility]



               00                                 IV, Once,           



                                                  first dose           



                                                  03/10/16           



                                                  16:05:00           



                                                  CST, stop           



                                                  date           



                                                  03/10/16           



                                                  16:05:00           



                                                  CST            

 

     midazolam            No   Deuce                0.5 mg =           [facility]

[facility]



               3-10           [facility]                0.5 mL,           l



               22:05:                               Injection,           [facility]



               00                                 IV, Once,           



                                                  first dose           



                                                  03/10/16           



                                                  16:05:00           



                                                  CST, stop           



                                                  date           



                                                  03/10/16           



                                                  16:05:00           



                                                  CST            

 

     fentaNYL            No   Deuce                25 mcg =           [facility]

[facility]



               3-10           W[facility]                0.5 mL,           l



               22:05:                               Injection,           [facility]



               00                                 IV, Once,           



                                                  first dose           



                                                  03/10/16           



                                                  16:05:00           



                                                  CST, stop           



                                                  date           



                                                  03/10/16           



                                                  16:05:00           



                                                  CST            

 

     midazolam            No   Deuce                0.5 mg =           [facility]

[facility]



               3-10           [facility]                0.5 mL,           l



               22:05:                               Injection,           [facility]



               00                                 IV, Once,           



                                                  first dose           



                                                  03/10/16           



                                                  16:05:00           



                                                  CST, stop           



                                                  date           



                                                  03/10/16           



                                                  16:05:00           



                                                  CST            

 

     fentaNYL            No   Deuce                25 mcg =           [facility]

[facility]



               3-10           [facility]                0.5 mL,           l



               22:05:                               Injection,           [facility]



               00                                 IV, Once,           



                                                  first dose           



                                                  03/10/16           



                                                  16:05:00           



                                                  CST, stop           



                                                  date           



                                                  03/10/16           



                                                  16:05:00           



                                                  CST            

 

     midazolam            No   Deuce                0.5 mg =           [facility]

[facility]



               3-10           [facility]                0.5 mL,           l



               22:05:                               Injection,           [facility]



               00                                 IV, Once,           



                                                  first dose           



                                                  03/10/16           



                                                  16:05:00           



                                                  CST, stop           



                                                  date           



                                                  03/10/16           



                                                  16:05:00           



                                                  CST            

 

     fentaNYL            No   Deuce                25 mcg =           [facility]

[facility]



               3-10           [facility]                0.5 mL,           l



               22:05:                               Injection,           [facility]



               00                                 IV, Once,           



                                                  first dose           



                                                  03/10/16           



                                                  16:05:00           



                                                  CST, stop           



                                                  date           



                                                  03/10/16           



                                                  16:05:00           



                                                  CST            

 

     midazolam      -0      No   Deuce                0.5 mg =           [facility]

[facility]



               3-10           [facility]                0.5 mL,           l



               22:05:                               Injection,           [facility]



               00                                 IV, Once,           



                                                  first dose           



                                                  03/10/16           



                                                  16:05:00           



                                                  CST, stop           



                                                  date           



                                                  03/10/16           



                                                  16:05:00           



                                                  CST            

 

     fentaNYL      -0      No   Deuce                25 mcg =           [facility]

[facility]



               3-10           [facility]                0.5 mL,           l



               22:05:                               Injection,           [facility]



               00                                 IV, Once,           



                                                  first dose           



                                                  03/10/16           



                                                  16:05:00           



                                                  CST, stop           



                                                  date           



                                                  03/10/16           



                                                  16:05:00           



                                                  CST            

 

     midazolam      -0      No   Deuce                0.5 mg =           [facility]

[facility]



               3-10           [facility]                0.5 mL,           l



               22:05:                               Injection,           [facility]



               00                                 IV, Once,           



                                                  first dose           



                                                  03/10/16           



                                                  16:05:00           



                                                  CST, stop           



                                                  date           



                                                  03/10/16           



                                                  16:05:00           



                                                  CST            

 

     fentaNYL      -0      No   Deuce                25 mcg =           [facility]

[facility]



               3-10           [facility]                0.5 mL,           l



               22:05:                               Injection,           [facility]



               00                                 IV, Once,           



                                                  first dose           



                                                  03/10/16           



                                                  16:05:00           



                                                  CST, stop           



                                                  date           



                                                  03/10/16           



                                                  16:05:00           



                                                  CST            

 

     midazolam      2016-0      No   Deuce                0.5 mg =           [facility]

[facility]



               3-10           [facility]                0.5 mL,           l



               22:05:                               Injection,           [facility]



               00                                 IV, Once,           



                                                  first dose           



                                                  03/10/16           



                                                  16:05:00           



                                                  CST, stop           



                                                  date           



                                                  03/10/16           



                                                  16:05:00           



                                                  CST            

 

     fentaNYL      -0      No   Deuce                25 mcg =           [facility]

[facility]



               3-10           [facility]                0.5 mL,           l



               22:05:                               Injection,           [facility]



               00                                 IV, Once,           



                                                  first dose           



                                                  03/10/16           



                                                  16:05:00           



                                                  CST, stop           



                                                  date           



                                                  03/10/16           



                                                  16:05:00           



                                                  CST            

 

     midazolam      -0      No   Deuce                0.5 mg =           [facility]

[facility]



               3-10           [facility]                0.5 mL,           l



               22:05:                               Injection,           [facility]



               00                                 IV, Once,           



                                                  first dose           



                                                  03/10/16           



                                                  16:05:00           



                                                  CST, stop           



                                                  date           



                                                  03/10/16           



                                                  16:05:00           



                                                  CST            

 

     fentaNYL      -0      No   Deuce                25 mcg =           [facility]

[facility]



               3-10           [facility]                0.5 mL,           l



               22:05:                               Injection,           [facility]



               00                                 IV, Once,           



                                                  first dose           



                                                  03/10/16           



                                                  16:05:00           



                                                  CST, stop           



                                                  date           



                                                  03/10/16           



                                                  16:05:00           



                                                  CST            

 

     midazolam      -      No   Deuce                0.5 mg =           [facility]

[facility]



               3-10           [facility]                0.5 mL,           l



               22:05:                               Injection,           [facility]



               00                                 IV, Once,           



                                                  first dose           



                                                  03/10/16           



                                                  16:05:00           



                                                  CST, stop           



                                                  date           



                                                  03/10/16           



                                                  16:05:00           



                                                  CST            

 

     fentaNYL      -      No   Deuce                25 mcg =           [facility]

[facility]



               3-10           [facility]                0.5 mL,           l



               22:05:                               Injection,           [facility]



               00                                 IV, Once,           



                                                  first dose           



                                                  03/10/16           



                                                  16:05:00           



                                                  CST, stop           



                                                  date           



                                                  03/10/16           



                                                  16:05:00           



                                                  CST            

 

     midazolam      -      No   Deuce                0.5 mg =           [facility]

[facility]



               3-10           [facility]                0.5 mL,           l



               22:05:                               Injection,           [facility]



               00                                 IV, Once,           



                                                  first dose           



                                                  03/10/16           



                                                  16:05:00           



                                                  CST, stop           



                                                  date           



                                                  03/10/16           



                                                  16:05:00           



                                                  CST            

 

     lidocaine      -0      No   [facility]                3 mL,           [facility]

[facility]



               3-10           [facility]                Injection,           l



               21:58:                               IV, Once,           [facility]



               00                                 first dose           



                                                  03/10/16           



                                                  15:58:00           



                                                  CST, stop           



                                                  date           



                                                  03/10/16           



                                                  15:58:00           



                                                  CST            

 

     propofol            No   Deuce                120 mg =           [facility]

[facility]



               3-10           [facility]                12 mL,           l



               21:58:                               Emulsion,           [facility]



               00                                 IV, Once,           



                                                  first dose           



                                                  03/10/16           



                                                  15:58:00           



                                                  CST, stop           



                                                  date           



                                                  03/10/16           



                                                  15:58:00           



                                                  CST            

 

     lidocaine      -0      No   Deuce                3 mL,           [facility]

[facility]



               3-10           [facility]                Injection,           l



               21:58:                               IV, Once,           [facility]



               00                                 first dose           



                                                  03/10/16           



                                                  15:58:00           



                                                  CST, stop           



                                                  date           



                                                  03/10/16           



                                                  15:58:00           



                                                  CST            

 

     propofol      -      No   Deuce                120 mg =           [facility]

[facility]



               3-10           [facility]                12 mL,           l



               21:58:                               Emulsion,           [facility]



               00                                 IV, Once,           



                                                  first dose           



                                                  03/10/16           



                                                  15:58:00           



                                                  CST, stop           



                                                  date           



                                                  03/10/16           



                                                  15:58:00           



                                                  CST            

 

     lidocaine      -0      No   [facility]                3 mL,           [facility]

[facility]



               3-10           [facility]                Injection,           l



               21:58:                               IV, Once,           [facility]



               00                                 first dose           



                                                  03/10/16           



                                                  15:58:00           



                                                  CST, stop           



                                                  date           



                                                  03/10/16           



                                                  15:58:00           



                                                  CST            

 

     propofol      -      No   Deuce                120 mg =           [facility]

[facility]



               3-10           [facility]                12 mL,           l



               21:58:                               Emulsion,           [facility]



               00                                 IV, Once,           



                                                  first dose           



                                                  03/10/16           



                                                  15:58:00           



                                                  CST, stop           



                                                  date           



                                                  03/10/16           



                                                  15:58:00           



                                                  CST            

 

     lidocaine            No   Deuce                3 mL,           [facility]

[facility]



               3-10           [facility]                Injection,           l



               21:58:                               IV, Once,           [facility]



               00                                 first dose           



                                                  03/10/16           



                                                  15:58:00           



                                                  CST, stop           



                                                  date           



                                                  03/10/16           



                                                  15:58:00           



                                                  CST            

 

     propofol            No   Deuce                120 mg =           [facility]

[facility]



               3-10           [facility]                12 mL,           l



               21:58:                               Emulsion,           [facility]



               00                                 IV, Once,           



                                                  first dose           



                                                  03/10/16           



                                                  15:58:00           



                                                  CST, stop           



                                                  date           



                                                  03/10/16           



                                                  15:58:00           



                                                  CST            

 

     lidocaine            No   Deuce                3 mL,           [facility]

[facility]



               3-10           [facility]                Injection,           l



               21:58:                               IV, Once,           [facility]



               00                                 first dose           



                                                  03/10/16           



                                                  15:58:00           



                                                  CST, stop           



                                                  date           



                                                  03/10/16           



                                                  15:58:00           



                                                  CST            

 

     propofol      -      No   Deuce                120 mg =           [facility]

[facility]



               3-10           [facility]                12 mL,           l



               21:58:                               Emulsion,           [facility]



               00                                 IV, Once,           



                                                  first dose           



                                                  03/10/16           



                                                  15:58:00           



                                                  CST, stop           



                                                  date           



                                                  03/10/16           



                                                  15:58:00           



                                                  CST            

 

     lidocaine            No   Deuce                3 mL,           [facility]

[facility]



               3-10           [facility]                Injection,           l



               21:58:                               IV, Once,           [facility]



               00                                 first dose           



                                                  03/10/16           



                                                  15:58:00           



                                                  CST, stop           



                                                  date           



                                                  03/10/16           



                                                  15:58:00           



                                                  CST            

 

     propofol      -      No   Deuce                120 mg =           [facility]

[facility]



               3-10           [facility]                12 mL,           l



               21:58:                               Emulsion,           [facility]



               00                                 IV, Once,           



                                                  first dose           



                                                  03/10/16           



                                                  15:58:00           



                                                  CST, stop           



                                                  date           



                                                  03/10/16           



                                                  15:58:00           



                                                  CST            

 

     lidocaine      -0      No   Deuce                3 mL,           [facility]

[facility]



               3-10           [facility]                Injection,           l



               21:58:                               IV, Once,           [facility]



               00                                 first dose           



                                                  03/10/16           



                                                  15:58:00           



                                                  CST, stop           



                                                  date           



                                                  03/10/16           



                                                  15:58:00           



                                                  CST            

 

     propofol            No   Deuce                120 mg =           [facility]

[facility]



               3-10           [facility]                12 mL,           l



               21:58:                               Emulsion,           [facility]



               00                                 IV, Once,           



                                                  first dose           



                                                  03/10/16           



                                                  15:58:00           



                                                  CST, stop           



                                                  date           



                                                  03/10/16           



                                                  15:58:00           



                                                  CST            

 

     lidocaine            No   Deuce                3 mL,           [facility]

[facility]



               3-10           [facility]                Injection,           l



               21:58:                               IV, Once,           [facility]



               00                                 first dose           



                                                  03/10/16           



                                                  15:58:00           



                                                  CST, stop           



                                                  date           



                                                  03/10/16           



                                                  15:58:00           



                                                  CST            

 

     propofol            No   Deuce                120 mg =           [facility]

[facility]



               3-10           [facility]                12 mL,           l



               21:58:                               Emulsion,           [facility]



               00                                 IV, Once,           



                                                  first dose           



                                                  03/10/16           



                                                  15:58:00           



                                                  CST, stop           



                                                  date           



                                                  03/10/16           



                                                  15:58:00           



                                                  CST            

 

     lidocaine            No   Deuce                3 mL,           [facility]

[facility]



               3-10           W[facility]                Injection,           l



               21:58:                               IV, Once,           [facility]



               00                                 first dose           



                                                  03/10/16           



                                                  15:58:00           



                                                  CST, stop           



                                                  date           



                                                  03/10/16           



                                                  15:58:00           



                                                  CST            

 

     propofol            No   Deuce                120 mg =           [facility]

[facility]



               3-10           [facility]                12 mL,           l



               21:58:                               Emulsion,           [facility]



               00                                 IV, Once,           



                                                  first dose           



                                                  03/10/16           



                                                  15:58:00           



                                                  CST, stop           



                                                  date           



                                                  03/10/16           



                                                  15:58:00           



                                                  CST            

 

     midazolam            No   Deuce                0.5 mg =           [facility]

[facility]



               3-10           [facility]                0.5 mL,           l



               21:50:                               Injection,           [facility]



               00                                 IV, Once,           



                                                  first dose           



                                                  03/10/16           



                                                  15:50:00           



                                                  CST, stop           



                                                  date           



                                                  03/10/16           



                                                  15:50:00           



                                                  CST            

 

     fentaNYL            No   Deuce                25 mcg =           [facility]

[facility]



               3-10           [facility]                0.5 mL,           l



               21:50:                               Injection,           [facility]



               00                                 IV, Once,           



                                                  first dose           



                                                  03/10/16           



                                                  15:50:00           



                                                  CST, stop           



                                                  date           



                                                  03/10/16           



                                                  15:50:00           



                                                  CST            

 

     midazolam            No   Deuce                0.5 mg =           [facility]

[facility]



               3-10           [facility]                0.5 mL,           l



               21:50:                               Injection,           [facility]



               00                                 IV, Once,           



                                                  first dose           



                                                  03/10/16           



                                                  15:50:00           



                                                  CST, stop           



                                                  date           



                                                  03/10/16           



                                                  15:50:00           



                                                  CST            

 

     fentaNYL            No   Deuce                25 mcg =           [facility]

[facility]



               3-10           [facility]                0.5 mL,           l



               21:50:                               Injection,           [facility]



               00                                 IV, Once,           



                                                  first dose           



                                                  03/10/16           



                                                  15:50:00           



                                                  CST, stop           



                                                  date           



                                                  03/10/16           



                                                  15:50:00           



                                                  CST            

 

     midazolam            No   Deuce                0.5 mg =           [facility]

[facility]



               3-10           [facility]                0.5 mL,           l



               21:50:                               Injection,           [facility]



               00                                 IV, Once,           



                                                  first dose           



                                                  03/10/16           



                                                  15:50:00           



                                                  CST, stop           



                                                  date           



                                                  03/10/16           



                                                  15:50:00           



                                                  CST            

 

     fentaNYL            No   Deuce                25 mcg =           [facility]

[facility]



               3-10           [facility]                0.5 mL,           l



               21:50:                               Injection,           [facility]



               00                                 IV, Once,           



                                                  first dose           



                                                  03/10/16           



                                                  15:50:00           



                                                  CST, stop           



                                                  date           



                                                  03/10/16           



                                                  15:50:00           



                                                  CST            

 

     midazolam            No   Deuce                0.5 mg =           [facility]

[facility]



               3-10           [facility]                0.5 mL,           l



               21:50:                               Injection,           [facility]



               00                                 IV, Once,           



                                                  first dose           



                                                  03/10/16           



                                                  15:50:00           



                                                  CST, stop           



                                                  date           



                                                  03/10/16           



                                                  15:50:00           



                                                  CST            

 

     fentaNYL            No   Deuce                25 mcg =           [facility]

[facility]



               3-10           [facility]                0.5 mL,           l



               21:50:                               Injection,           [facility]



               00                                 IV, Once,           



                                                  first dose           



                                                  03/10/16           



                                                  15:50:00           



                                                  CST, stop           



                                                  date           



                                                  03/10/16           



                                                  15:50:00           



                                                  CST            

 

     midazolam            No   Deuce                0.5 mg =           [facility]

[facility]



               3-10           [facility]                0.5 mL,           l



               21:50:                               Injection,           [facility]



               00                                 IV, Once,           



                                                  first dose           



                                                  03/10/16           



                                                  15:50:00           



                                                  CST, stop           



                                                  date           



                                                  03/10/16           



                                                  15:50:00           



                                                  CST            

 

     fentaNYL            No   Deuce                25 mcg =           [facility]

[facility]



               3-10           [facility]                0.5 mL,           l



               21:50:                               Injection,           [facility]



               00                                 IV, Once,           



                                                  first dose           



                                                  03/10/16           



                                                  15:50:00           



                                                  CST, stop           



                                                  date           



                                                  03/10/16           



                                                  15:50:00           



                                                  CST            

 

     midazolam      -0      No   [facility]                0.5 mg =           [facility]

[facility]



               3-10           [facility]                0.5 mL,           l



               21:50:                               Injection,           [facility]



               00                                 IV, Once,           



                                                  first dose           



                                                  03/10/16           



                                                  15:50:00           



                                                  CST, stop           



                                                  date           



                                                  03/10/16           



                                                  15:50:00           



                                                  CST            

 

     fentaNYL      -0      No   Deuce                25 mcg =           [facility]

[facility]



               3-10           [facility]                0.5 mL,           l



               21:50:                               Injection,           [facility]



               00                                 IV, Once,           



                                                  first dose           



                                                  03/10/16           



                                                  15:50:00           



                                                  CST, stop           



                                                  date           



                                                  03/10/16           



                                                  15:50:00           



                                                  CST            

 

     midazolam      -0      No   Deuce                0.5 mg =           [facility]

[facility]



               3-10           [facility]                0.5 mL,           l



               21:50:                               Injection,           [facility]



               00                                 IV, Once,           



                                                  first dose           



                                                  03/10/16           



                                                  15:50:00           



                                                  CST, stop           



                                                  date           



                                                  03/10/16           



                                                  15:50:00           



                                                  CST            

 

     fentaNYL      -      No   Deuce                25 mcg =           [facility]

[facility]



               3-10           [facility]                0.5 mL,           l



               21:50:                               Injection,           [facility]



               00                                 IV, Once,           



                                                  first dose           



                                                  03/10/16           



                                                  15:50:00           



                                                  CST, stop           



                                                  date           



                                                  03/10/16           



                                                  15:50:00           



                                                  CST            

 

     midazolam      -0      No   Deuce                0.5 mg =           [facility]

[facility]



               3-10           [facility]                0.5 mL,           l



               21:50:                               Injection,           [facility]



               00                                 IV, Once,           



                                                  first dose           



                                                  03/10/16           



                                                  15:50:00           



                                                  CST, stop           



                                                  date           



                                                  03/10/16           



                                                  15:50:00           



                                                  CST            

 

     fentaNYL      -0      No   Deuce                25 mcg =           [facility]

[facility]



               3-10           [facility]                0.5 mL,           l



               21:50:                               Injection,           [facility]



               00                                 IV, Once,           



                                                  first dose           



                                                  03/10/16           



                                                  15:50:00           



                                                  CST, stop           



                                                  date           



                                                  03/10/16           



                                                  15:50:00           



                                                  CST            

 

     midazolam      -0      No   Deuce                0.5 mg =           [facility]

[facility]



               3-10           [facility]                0.5 mL,           l



               21:50:                               Injection,           [facility]



               00                                 IV, Once,           



                                                  first dose           



                                                  03/10/16           



                                                  15:50:00           



                                                  CST, stop           



                                                  date           



                                                  03/10/16           



                                                  15:50:00           



                                                  CST            

 

     fentaNYL      -0      No   Deuce                25 mcg =           [facility]

[facility]



               3-10           [facility]                0.5 mL,           l



               21:50:                               Injection,           [facility]



               00                                 IV, Once,           



                                                  first dose           



                                                  03/10/16           



                                                  15:50:00           



                                                  CST, stop           



                                                  date           



                                                  03/10/16           



                                                  15:50:00           



                                                  CST            

 

     midazolam      2016-0      No   [facility]                0.5 mg =           [facility]

[facility]



               3-10           [facility]                0.5 mL,           l



               21:10:                               Injection,           [facility]



               00                                 IV, Once,           



                                                  first dose           



                                                  03/10/16           



                                                  15:10:00           



                                                  CST, stop           



                                                  date           



                                                  03/10/16           



                                                  15:10:00           



                                                  CST            

 

     fentaNYL      2016-0      No   Deuce                25 mcg =           [facility]

[facility]



               3-10           [facility]                0.5 mL,           l



               21:10:                               Injection,           [facility]



               00                                 IV, Once,           



                                                  first dose           



                                                  03/10/16           



                                                  15:10:00           



                                                  CST, stop           



                                                  date           



                                                  03/10/16           



                                                  15:10:00           



                                                  CST            

 

     midazolam      2016-0      No   [facility]                0.5 mg =           [facility]

[facility]



               3-10           [facility]                0.5 mL,           l



               21:10:                               Injection,           [facility]



               00                                 IV, Once,           



                                                  first dose           



                                                  03/10/16           



                                                  15:10:00           



                                                  CST, stop           



                                                  date           



                                                  03/10/16           



                                                  15:10:00           



                                                  CST            

 

     fentaNYL      2016-0      No   Deuce                25 mcg =           [facility]

[facility]



               3-10           [facility]                0.5 mL,           l



               21:10:                               Injection,           [facility]



               00                                 IV, Once,           



                                                  first dose           



                                                  03/10/16           



                                                  15:10:00           



                                                  CST, stop           



                                                  date           



                                                  03/10/16           



                                                  15:10:00           



                                                  CST            

 

     midazolam      2016-0      No   Deuce                0.5 mg =           [facility]

[facility]



               3-10           [facility]                0.5 mL,           l



               21:10:                               Injection,           [facility]



               00                                 IV, Once,           



                                                  first dose           



                                                  03/10/16           



                                                  15:10:00           



                                                  CST, stop           



                                                  date           



                                                  03/10/16           



                                                  15:10:00           



                                                  CST            

 

     fentaNYL      2016-0      No   Deuce                25 mcg =           [facility]

[facility]



               3-10           [facility]                0.5 mL,           l



               21:10:                               Injection,           [facility]



               00                                 IV, Once,           



                                                  first dose           



                                                  03/10/16           



                                                  15:10:00           



                                                  CST, stop           



                                                  date           



                                                  03/10/16           



                                                  15:10:00           



                                                  CST            

 

     midazolam      2016-0      No   Deuce                0.5 mg =           Me

[facility]



               3-10           [facility]                0.5 mL,           l



               21:10:                               Injection,           [facility]



               00                                 IV, Once,           



                                                  first dose           



                                                  03/10/16           



                                                  15:10:00           



                                                  CST, stop           



                                                  date           



                                                  03/10/16           



                                                  15:10:00           



                                                  CST            

 

     fentaNYL      2016-0      No   Deuce                25 mcg =           [facility]

[facility]



               3-10           [facility]                0.5 mL,           l



               21:10:                               Injection,           [facility]



               00                                 IV, Once,           



                                                  first dose           



                                                  03/10/16           



                                                  15:10:00           



                                                  CST, stop           



                                                  date           



                                                  03/10/16           



                                                  15:10:00           



                                                  CST            

 

     midazolam      -0      No   Deuce                0.5 mg =           [facility]

[facility]



               3-10           [facility]                0.5 mL,           l



               21:10:                               Injection,           [facility]



               00                                 IV, Once,           



                                                  first dose           



                                                  03/10/16           



                                                  15:10:00           



                                                  CST, stop           



                                                  date           



                                                  03/10/16           



                                                  15:10:00           



                                                  CST            

 

     fentaNYL      -0      No   Deuce                25 mcg =           [facility]

[facility]



               3-10           [facility]                0.5 mL,           l



               21:10:                               Injection,           [facility]



               00                                 IV, Once,           



                                                  first dose           



                                                  03/10/16           



                                                  15:10:00           



                                                  CST, stop           



                                                  date           



                                                  03/10/16           



                                                  15:10:00           



                                                  CST            

 

     midazolam      -0      No   Deuce                0.5 mg =           [facility]

[facility]



               3-10           [facility]                0.5 mL,           l



               21:10:                               Injection,           [facility]



               00                                 IV, Once,           



                                                  first dose           



                                                  03/10/16           



                                                  15:10:00           



                                                  CST, stop           



                                                  date           



                                                  03/10/16           



                                                  15:10:00           



                                                  CST            

 

     fentaNYL      -0      No   Deuce                25 mcg =           [facility]

[facility]



               3-10           [facility]                0.5 mL,           l



               21:10:                               Injection,           [facility]



               00                                 IV, Once,           



                                                  first dose           



                                                  03/10/16           



                                                  15:10:00           



                                                  CST, stop           



                                                  date           



                                                  03/10/16           



                                                  15:10:00           



                                                  CST            

 

     midazolam      2016-0      No   Deuce                0.5 mg =           [facility]

[facility]



               3-10           [facility]                0.5 mL,           l



               21:10:                               Injection,           [facility]



               00                                 IV, Once,           



                                                  first dose           



                                                  03/10/16           



                                                  15:10:00           



                                                  CST, stop           



                                                  date           



                                                  03/10/16           



                                                  15:10:00           



                                                  CST            

 

     fentaNYL      2016-0      No   Deuce                25 mcg =           [facility]

[facility]



               3-10           [facility]                0.5 mL,           l



               21:10:                               Injection,           [facility]



               00                                 IV, Once,           



                                                  first dose           



                                                  03/10/16           



                                                  15:10:00           



                                                  CST, stop           



                                                  date           



                                                  03/10/16           



                                                  15:10:00           



                                                  CST            

 

     midazolam      -0      No   Deuce                0.5 mg =           [facility]

[facility]



               3-10           [facility]                0.5 mL,           l



               21:10:                               Injection,           [facility]



               00                                 IV, Once,           



                                                  first dose           



                                                  03/10/16           



                                                  15:10:00           



                                                  CST, stop           



                                                  date           



                                                  03/10/16           



                                                  15:10:00           



                                                  CST            

 

     fentaNYL      2016-0      No   Deuce                25 mcg =           [facility]

[facility]



               3-10           [facility]                0.5 mL,           l



               21:10:                               Injection,           [facility]



               00                                 IV, Once,           



                                                  first dose           



                                                  03/10/16           



                                                  15:10:00           



                                                  CST, stop           



                                                  date           



                                                  03/10/16           



                                                  15:10:00           



                                                  CST            

 

     midazolam      2016-0      No   Deuce                0.5 mg =           [facility]

[facility]



               3-10           [facility]                0.5 mL,           l



               21:10:                               Injection,           [facility]



               00                                 IV, Once,           



                                                  first dose           



                                                  03/10/16           



                                                  15:10:00           



                                                  CST, stop           



                                                  date           



                                                  03/10/16           



                                                  15:10:00           



                                                  CST            

 

     fentaNYL      -0      No   Deuce                25 mcg =           [facility]

[facility]



               3-10           [facility]                0.5 mL,           l



               21:10:                               Injection,           [facility]



               00                                 IV, Once,           



                                                  first dose           



                                                  03/10/16           



                                                  15:10:00           



                                                  CST, stop           



                                                  date           



                                                  03/10/16           



                                                  15:10:00           



                                                  CST            

 

     fentaNYL      2016-0      No   Deuce                25 mcg =           [facility]

[facility]



               3-10           [facility]                0.5 mL,           l



               21:05:                               Injection,           [facility]



               00                                 IV, Once,           



                                                  first dose           



                                                  03/10/16           



                                                  15:05:00           



                                                  CST, stop           



                                                  date           



                                                  03/10/16           



                                                  15:05:00           



                                                  CST            

 

     midazolam      -0      No   Deuce                0.5 mg =           [facility]

[facility]



               3-10           [facility]                0.5 mL,           l



               21:05:                               Injection,           [facility]



               00                                 IV, Once,           



                                                  first dose           



                                                  03/10/16           



                                                  15:05:00           



                                                  CST, stop           



                                                  date           



                                                  03/10/16           



                                                  15:05:00           



                                                  CST            

 

     fentaNYL      -0      No   Deuce                25 mcg =           [facility]

[facility]



               3-10           [facility]                0.5 mL,           l



               21:05:                               Injection,           [facility]



               00                                 IV, Once,           



                                                  first dose           



                                                  03/10/16           



                                                  15:05:00           



                                                  CST, stop           



                                                  date           



                                                  03/10/16           



                                                  15:05:00           



                                                  CST            

 

     midazolam      -0      No   Deuce                0.5 mg =           [facility]

[facility]



               3-10           [facility]                0.5 mL,           l



               21:05:                               Injection,           [facility]



               00                                 IV, Once,           



                                                  first dose           



                                                  03/10/16           



                                                  15:05:00           



                                                  CST, stop           



                                                  date           



                                                  03/10/16           



                                                  15:05:00           



                                                  CST            

 

     fentaNYL      2016-0      No   Deuce                25 mcg =           [facility]

[facility]



               3-10           [facility]                0.5 mL,           l



               21:05:                               Injection,           [facility]



               00                                 IV, Once,           



                                                  first dose           



                                                  03/10/16           



                                                  15:05:00           



                                                  CST, stop           



                                                  date           



                                                  03/10/16           



                                                  15:05:00           



                                                  CST            

 

     midazolam      -0      No   Deuce                0.5 mg =           [facility]

[facility]



               3-10           [facility]                0.5 mL,           l



               21:05:                               Injection,           [facility]



               00                                 IV, Once,           



                                                  first dose           



                                                  03/10/16           



                                                  15:05:00           



                                                  CST, stop           



                                                  date           



                                                  03/10/16           



                                                  15:05:00           



                                                  CST            

 

     fentaNYL      2016-0      No   Deuce                25 mcg =           [facility]

[facility]



               3-10           [facility]                0.5 mL,           l



               21:05:                               Injection,           [facility]



               00                                 IV, Once,           



                                                  first dose           



                                                  03/10/16           



                                                  15:05:00           



                                                  CST, stop           



                                                  date           



                                                  03/10/16           



                                                  15:05:00           



                                                  CST            

 

     midazolam      -0      No   Deuce                0.5 mg =           [facility]

[facility]



               3-10           [facility]                0.5 mL,           l



               21:05:                               Injection,           [facility]



               00                                 IV, Once,           



                                                  first dose           



                                                  03/10/16           



                                                  15:05:00           



                                                  CST, stop           



                                                  date           



                                                  03/10/16           



                                                  15:05:00           



                                                  CST            

 

     fentaNYL      2016-0      No   Deuce                25 mcg =           [facility]

[facility]



               3-10           [facility]                0.5 mL,           l



               21:05:                               Injection,           [facility]



               00                                 IV, Once,           



                                                  first dose           



                                                  03/10/16           



                                                  15:05:00           



                                                  CST, stop           



                                                  date           



                                                  03/10/16           



                                                  15:05:00           



                                                  CST            

 

     midazolam      -0      No   Deuce                0.5 mg =           [facility]

[facility]



               3-10           [facility]                0.5 mL,           l



               21:05:                               Injection,           [facility]



               00                                 IV, Once,           



                                                  first dose           



                                                  03/10/16           



                                                  15:05:00           



                                                  CST, stop           



                                                  date           



                                                  03/10/16           



                                                  15:05:00           



                                                  CST            

 

     fentaNYL      2016-0      No   Deuce                25 mcg =           [facility]

[facility]



               3-10           [facility]                0.5 mL,           l



               21:05:                               Injection,           [facility]



               00                                 IV, Once,           



                                                  first dose           



                                                  03/10/16           



                                                  15:05:00           



                                                  CST, stop           



                                                  date           



                                                  03/10/16           



                                                  15:05:00           



                                                  CST            

 

     midazolam      -0      No   Deuce                0.5 mg =           [facility]

[facility]



               3-10           [facility]                0.5 mL,           l



               21:05:                               Injection,           [facility]



               00                                 IV, Once,           



                                                  first dose           



                                                  03/10/16           



                                                  15:05:00           



                                                  CST, stop           



                                                  date           



                                                  03/10/16           



                                                  15:05:00           



                                                  CST            

 

     fentaNYL      -0      No   Deuce                25 mcg =           [facility]

[facility]



               3-10           [facility]                0.5 mL,           l



               21:05:                               Injection,           [facility]



               00                                 IV, Once,           



                                                  first dose           



                                                  03/10/16           



                                                  15:05:00           



                                                  CST, stop           



                                                  date           



                                                  03/10/16           



                                                  15:05:00           



                                                  CST            

 

     midazolam      2016-0      No   Deuce                0.5 mg =           Me

[facility]ia



               3-10           Wheat                0.5 mL,           l



               21:05:                               Injection,           [facility]



               00                                 IV, Once,           



                                                  first dose           



                                                  03/10/16           



                                                  15:05:00           



                                                  CST, stop           



                                                  date           



                                                  03/10/16           



                                                  15:05:00           



                                                  CST            

 

     fentaNYL      2016-0      No   Deuce                25 mcg =           [facility]

[facility]



               3-10           [place]                0.5 mL,           l



               21:05:                               Injection,           [facility]



               00                                 IV, Once,           



                                                  first dose           



                                                  03/10/16           



                                                  15:05:00           



                                                  CST, stop           



                                                  date           



                                                  03/10/16           



                                                  15:05:00           



                                                  CST            

 

     midazolam      -0      No   Deuce                0.5 mg =           Me

[facility]



               3-10           Wheat                0.5 mL,           l



               21:05:                               Injection,           [facility]



               00                                 IV, Once,           



                                                  first dose           



                                                  03/10/16           



                                                  15:05:00           



                                                  CST, stop           



                                                  date           



                                                  03/10/16           



                                                  15:05:00           



                                                  CST            

 

     fentaNYL      -0      No   Deuce                25 mcg =           [facility]

[facility]



               3-10           [place]heat                0.5 mL,           l



               21:05:                               Injection,           [facility]



               00                                 IV, Once,           



                                                  first dose           



                                                  03/10/16           



                                                  15:05:00           



                                                  CST, stop           



                                                  date           



                                                  03/10/16           



                                                  15:05:00           



                                                  CST            

 

     midazolam      -0      No   Deuce                0.5 mg =           Me

[facility]



               3-10           [place]heat                0.5 mL,           l



               21:05:                               Injection,           [facility]



               00                                 IV, Once,           



                                                  first dose           



                                                  03/10/16           



                                                  15:05:00           



                                                  CST, stop           



                                                  date           



                                                  03/10/16           



                                                  15:05:00           



                                                  CST            

 

     clindamycin      0      No   Yoan                900 mg, IV        

   [facility]



               3-10           Lei                Piggyback,           l



               20:00:                               Once,           [facility]



               00                                 infuse           



                                                  over 30           



                                                  minutes,           



                                                  first dose           



                                                  03/10/16           



                                                  14:00:00           



                                                  CST, stop           



                                                  date           



                                                  03/10/16           



                                                  14:00:00           



                                                  CST,           



                                                  Prophylaxi           



                                                  s              

 

     clindamycin      0      No   Yoan                900 mg, IV        

   [facility]



               3-10           Lei                Piggyback,           l



               20:00:                               Once,           [facility]



               00                                 infuse           



                                                  over 30           



                                                  minutes,           



                                                  first dose           



                                                  03/10/16           



                                                  14:00:00           



                                                  CST, stop           



                                                  date           



                                                  03/10/16           



                                                  14:00:00           



                                                  CST,           



                                                  Prophylaxi           



                                                  s              

 

     clindamycin      2016-0      No   Yoan                900 mg, IV        

   [facility]



               3-10           Lei                Piggyback,           l



               20:00:                               Once,           [facility]



               00                                 infuse           



                                                  over 30           



                                                  minutes,           



                                                  first dose           



                                                  03/10/16           



                                                  14:00:00           



                                                  CST, stop           



                                                  date           



                                                  03/10/16           



                                                  14:00:00           



                                                  CST,           



                                                  Prophylaxi           



                                                  s              

 

     clindamycin      2016-0      No   Yoan                900 mg, IV        

   [facility]



               3-10           Lei                Piggyback,           l



               20:00:                               Once,           [facility]



               00                                 infuse           



                                                  over 30           



                                                  minutes,           



                                                  first dose           



                                                  03/10/16           



                                                  14:00:00           



                                                  CST, stop           



                                                  date           



                                                  03/10/16           



                                                  14:00:00           



                                                  CST,           



                                                  Prophylaxi           



                                                  s              

 

     clindamycin      2016-0      No   Yoan                900 mg, IV        

   [facility]



               3-10           Lei                Piggyback,           l



               20:00:                               Once,           [facility]



               00                                 infuse           



                                                  over 30           



                                                  minutes,           



                                                  first dose           



                                                  03/10/16           



                                                  14:00:00           



                                                  CST, stop           



                                                  date           



                                                  03/10/16           



                                                  14:00:00           



                                                  CST,           



                                                  Prophylaxi           



                                                  s              

 

     clindamycin      2016-0      No   Yoan                900 mg, IV        

   [facility]



               3-10           Lei                Piggyback,           l



               20:00:                               Once,           [facility]



               00                                 infuse           



                                                  over 30           



                                                  minutes,           



                                                  first dose           



                                                  03/10/16           



                                                  14:00:00           



                                                  CST, stop           



                                                  date           



                                                  03/10/16           



                                                  14:00:00           



                                                  CST,           



                                                  Prophylaxi           



                                                  s              

 

     clindamycin      2016-0      No   Yoan                900 mg, IV        

   [facility]



               3-10           Lei                Piggyback,           l



               20:00:                               Once,           [facility]



               00                                 infuse           



                                                  over 30           



                                                  minutes,           



                                                  first dose           



                                                  03/10/16           



                                                  14:00:00           



                                                  CST, stop           



                                                  date           



                                                  03/10/16           



                                                  14:00:00           



                                                  CST,           



                                                  Prophylaxi           



                                                  s              

 

     clindamycin      2016-0      No   Yoan                900 mg, IV        

   [facility]



               3-10           Lei                Piggyback,           l



               20:00:                               Once,           [facility]



               00                                 infuse           



                                                  over 30           



                                                  minutes,           



                                                  first dose           



                                                  03/10/16           



                                                  14:00:00           



                                                  CST, stop           



                                                  date           



                                                  03/10/16           



                                                  14:00:00           



                                                  CST,           



                                                  Prophylaxi           



                                                  s              

 

     clindamycin      2016-0      No   Yoan                900 mg, IV        

   [facility]



               3-10           Lei                Piggyback,           l



               20:00:                               Once,           [facility]



               00                                 infuse           



                                                  over 30           



                                                  minutes,           



                                                  first dose           



                                                  03/10/16           



                                                  14:00:00           



                                                  CST, stop           



                                                  date           



                                                  03/10/16           



                                                  14:00:00           



                                                  CST,           



                                                  Prophylaxi           



                                                  s              

 

     LR 1,000 mL            No   Ghassan K                1,000 mL,          

 [facility]



               3-10           Chun                IV, 30           l



               19:27:                               mL/hr,           [facility]



                                                start date           



                                                  03/10/16           



                                                  13:27:00           



                                                  CST            

 

     Lidocaine            No   Ghassan K                0.2 mL,           [facility]

[facility]



     2% 0.2 mL      3-10           Chun                Injection,           l



     IV Start      19:27:                               Subcutaneo           Her

hernand[facility]



     [[facility]]      00                                 us, Once           



                                                  PRN for           



                                                  other (see           



                                                  comment),           



                                                  first dose           



                                                  03/10/16           



                                                  13:27:00           



                                                  CST            

 

     LR 1,000 mL            No   Ghassan K                1,000 mL,          

 [facility]



               3-10           Chun                IV, 30           l



               19:27:                               mL/hr,           [facility]



                                                start date           



                                                  03/10/16           



                                                  13:27:00           



                                                  CST            

 

     Lidocaine            No   Ghassan K                0.2 mL,           [facility]

[facility]



     2% 0.2 mL      3-10           Chun                Injection,           l



     IV Start      19:27:                               Subcutaneo           Her

hernandes



     [[facility]]      00                                 us, Once           



                                                  PRN for           



                                                  other (see           



                                                  comment),           



                                                  first dose           



                                                  03/10/16           



                                                  13:27:00           



                                                  CST            

 

     LR 1,000 mL            No   Ghassan K                1,000 mL,          

 [facility]



               3-10           Chun                IV, 30           l



               19:27:                               mL/hr,           [facility]



                                                start date           



                                                  03/10/16           



                                                  13:27:00           



                                                  CST            

 

     Lidocaine            No   Ghassan K                0.2 mL,           [facility]

[facility]



     2% 0.2 mL      3-10           Chun                Injection,           l



     IV Start      19:27:                               Subcutaneo           Her

hernandes



     [[facility]]      00                                 us, Once           



                                                  PRN for           



                                                  other (see           



                                                  comment),           



                                                  first dose           



                                                  03/10/16           



                                                  13:27:00           



                                                  CST            

 

     LR 1,000 mL            No   Ghassan K                1,000 mL,          

 [facility]



               3-10           Chun                IV, 30           l



               19:27:                               mL/hr,           [facility]



                                                start date           



                                                  03/10/16           



                                                  13:27:00           



                                                  CST            

 

     Lidocaine            No   Ghassan K                0.2 mL,           [facility]

[facility]



     2% 0.2 mL      3-10           Ch[facility]                Injection,           l



     IV Start      19:27:                               Subcutaneo           Her

hernandes



     [[facility]]      00                                 us, Once           



                                                  PRN for           



                                                  other (see           



                                                  comment),           



                                                  first dose           



                                                  03/10/16           



                                                  13:27:00           



                                                  CST            

 

     LR 1,000 mL            No   Ghassan K                1,000 mL,          

 [facility]



               3-10           Chun                IV, 30           l



               19:27:                               mL/hr,           [facility]



                                                start date           



                                                  03/10/16           



                                                  13:27:00           



                                                  CST            

 

     Lidocaine            No   Ghassan K                0.2 mL,           [facility]

[facility]



     2% 0.2 mL      3-10           Ch[facility]                Injection,           l



     IV Start      19:27:                               Subcutaneo           Her

hernandes



     [[facility]]      00                                 us, Once           



                                                  PRN for           



                                                  other (see           



                                                  comment),           



                                                  first dose           



                                                  03/10/16           



                                                  13:27:00           



                                                  CST            

 

     LR 1,000 mL            No   Ghassan K                1,000 mL,          

 [facility]



               3-10           Chun                IV, 30           l



               19:27:                               mL/hr,           [facility]



                                                start date           



                                                  03/10/16           



                                                  13:27:00           



                                                  CST            

 

     Lidocaine            No   Ghassan K                0.2 mL,           Mem

[facility]



     2% 0.2 mL      3-10           Chun                Injection,           l



     IV Start      19:27:                               Subcutaneo           Her

hernandes



     [[facility]]      00                                 us, Once           



                                                  PRN for           



                                                  other (see           



                                                  comment),           



                                                  first dose           



                                                  03/10/16           



                                                  13:27:00           



                                                  CST            

 

     LR 1,000 mL            No   Ghassan K                1,000 mL,          

 [facility]



               3-10           Chun                IV, 30           l



               19:27:                               mL/hr,           [facility]                                 start date           



                                                  03/10/16           



                                                  13:27:00           



                                                  CST            

 

     Lidocaine            No   Ghassan K                0.2 mL,           [facility]

[facility]



     2% 0.2 mL      3-10           [facility]                Injection,           l



     IV Start      19:27:                               Subcutaneo           Her

[facility]



     [[place]]      00                                 us, Once           



                                                  PRN for           



                                                  other (see           



                                                  comment),           



                                                  first dose           



                                                  03/10/16           



                                                  13:27:00           



                                                  CST            

 

     LR 1,000 mL            No   Ghassan K                1,000 mL,          

 [facility]



               3-10           [facility]                IV, 30           l



               19:27:                               mL/hr,           [facility]



                                                start date           



                                                  03/10/16           



                                                  13:27:00           



                                                  CST            

 

     Lidocaine            No   Ghassan K                0.2 mL,           [facility]

[facility]



     2% 0.2 mL      3-10           [facility]                Injection,           l



     IV Start      19:27:                               Subcutaneo           Her

hernandes



     [[place]]      00                                 us, Once           



                                                  PRN for           



                                                  other (see           



                                                  comment),           



                                                  first dose           



                                                  03/10/16           



                                                  13:27:00           



                                                  CST            

 

     LR 1,000 mL            No   Ghassan K                1,000 mL,          

 [facility]



               3-10           [facility]                IV, 30           l



               19:27:                               mL/hr,           [facility]



               00                                 start date           



                                                  03/10/16           



                                                  13:27:00           



                                                  CST            

 

     Lidocaine            No   Ghassan K                0.2 mL,           [facility]

[facility]



     2% 0.2 mL      3-10           [facility]                Injection,           l



     IV Start      19:27:                               Subcutaneo           Her

hernandes



     [[facility]]      00                                 us, Once           



                                                  PRN for           



                                                  other (see           



                                                  comment),           



                                                  first dose           



                                                  03/10/16           



                                                  13:27:00           



                                                  CST            

 

     Seroquel            Yes                      100 mg,           [facility]

a



               3-                               Oral,           l



               14:40:                               Daily, 0           [facility]



               00                                 Refill(s),           



                                                  migraines           

 

     acetaminoph            Yes                      1 tabs,           [facility]

[facility]



     en-HYDROcod      3-09                               Oral,           l



     one 325      14:40:                               q6hr, 0           [facility]



     mg-5 mg      00                                 Refill(s),           



     oral tablet                                         pain           

 

     traMADol 50            Yes                      50 mg = 1           [facility]

[facility]



     mg oral      3-                               tabs,           l



     tablet      14:40:                               Oral,           [facility]



               00                                 q4hr, 0           



                                                  Refill(s),           



                                                  pain           

 

     amLODIPine-            Yes                      1 tabs,           [facility]

[facility]



     atorvastati      3-09                               Oral, qHS,           l



     n 5 mg-40      14:40:                               0              [facility]



     mg oral      00                                 Refill(s),           



     tablet                                         cholestero           



                                                  l/hyperten           



                                                  alexx           

 

     Osteo            Yes                      Oral,           [facility]



     Bi-Flex      3-09                               Daily, 0           l



               14:40:                               Refill(s),           [facility]



               00                                 supplement           

 

     Nature's            Yes                      1,000 mg =           [facility]

[facility]



     Bounty Red      3-09                               2 caps,           l



     Krill Oil      14:40:                               Oral, BID,           [facility]

[facility]



     500 mg oral      00                                 0              



     capsule                                         Refill(s),           



                                                  supplement           

 

     aspirin 81            Yes                      81 mg = 1           [facility]

[facility]



     mg oral      3-09                               tabs,           l



     tablet      14:40:                               Oral,           [facility]



               00                                 Daily, 0           



                                                  Refill(s),           



                                                  supplement           

 

     Axert 12.5            Yes                      12.5 mg =           [facility]

[facility]



     mg oral      3-09                               1 tabs,           l



     tablet      14:40:                               Oral,           [facility]



               00                                 Once, PRN           



                                                  for            



                                                  migraine           



                                                  headache,           



                                                  may repeat           



                                                  dose once           



                                                  in 2           



                                                  hours, # 6           



                                                  tabs, 0           



                                                  Refill(s),           



                                                  migrainesm           



                                                  ay repeat           



                                                  dose once           



                                                  in 2 hours           

 

     Wellbutrin            Yes                      300 mg,           [facility]

[facility]



     XL        3-09                               Oral,           l



               14:40:                               q24hr, 0           [facility]



               00                                 Refill(s),           



                                                  migraines           

 

     Seroquel            Yes                      100 mg,           [facility]

a



               3-                               Oral,           l



               14:40:                               Daily, 0           [facility]



               00                                 Refill(s),           



                                                  migraines           

 

     acetaminoph            Yes                      1 tabs,           [facility]

[facility]



     en-HYDROcod      3-                               Oral,           l



     one 325      14:40:                               q6hr, 0           [facility]



     mg-5 mg      00                                 Refill(s),           



     oral tablet                                         pain           

 

     traMADol 50            Yes                      50 mg = 1           [facility]

[facility]



     mg oral      3-                               tabs,           l



     tablet      14:40:                               Oral,           [facility]



               00                                 q4hr, 0           



                                                  Refill(s),           



                                                  pain           

 

     amLODIPine-            Yes                      1 tabs,           [facility]

[facility]



     atorvastati      3-                               Oral, qHS,           l



     n 5 mg-40      14:40:                               0              [facility]



     mg oral      00                                 Refill(s),           



     tablet                                         cholestero           



                                                  l/hyperten           



                                                  alexx           

 

     Osteo            Yes                      Oral,           [facility]



     Bi-Flex      3-                               Daily, 0           l



               14:40:                               Refill(s),           [facility]



               00                                 supplement           

 

     Nature's            Yes                      1,000 mg =           [facility]

[facility]



     Bounty Red      3-09                               2 caps,           l



     Krill Oil      14:40:                               Oral, BID,           [facility]

[facility]



     500 mg oral      00                                 0              



     capsule                                         Refill(s),           



                                                  supplement           

 

     aspirin 81            Yes                      81 mg = 1           [facility]

[facility]



     mg oral      3-09                               tabs,           l



     tablet      14:40:                               Oral,           [facility]



               00                                 Daily, 0           



                                                  Refill(s),           



                                                  supplement           

 

     Axert 12.5      -      Yes                      12.5 mg =           [facility]

[facility]



     mg oral      3-09                               1 tabs,           l



     tablet      14:40:                               Oral,           [facility]



               00                                 Once, PRN           



                                                  for            



                                                  migraine           



                                                  headache,           



                                                  may repeat           



                                                  dose once           



                                                  in 2           



                                                  hours, # 6           



                                                  tabs, 0           



                                                  Refill(s),           



                                                  migrainesm           



                                                  ay repeat           



                                                  dose once           



                                                  in 2 hours           

 

     Wellbutrin            Yes                      300 mg,           [facility]

[facility]



     XL        3-09                               Oral,           l



               14:40:                               q24hr, 0           [facility]



               00                                 Refill(s),           



                                                  migraines           

 

     Seroquel            Yes                      100 mg,           [facility]

a



               3-09                               Oral,           l



               14:40:                               Daily, 0           [facility]



               00                                 Refill(s),           



                                                  migraines           

 

     acetaminoph            Yes                      1 tabs,           [facility]

[facility]



     en-HYDROcod      3-09                               Oral,           l



     one 325      14:40:                               q6hr, 0           [facility]



     mg-5 mg      00                                 Refill(s),           



     oral tablet                                         pain           

 

     traMADol 50            Yes                      50 mg = 1           [facility]

[facility]



     mg oral      3-09                               tabs,           l



     tablet      14:40:                               Oral,           [facility]



               00                                 q4hr, 0           



                                                  Refill(s),           



                                                  pain           

 

     amLODIPine-            Yes                      1 tabs,           [facility]

[facility]



     atorvastati      3-                               Oral, qHS,           l



     n 5 mg-40      14:40:                               0              [facility]



     mg oral      00                                 Refill(s),           



     tablet                                         cholestero           



                                                  l/hyperten           



                                                  alexx           

 

     Osteo            Yes                      Oral,           [facility]



     Bi-Flex      3-09                               Daily, 0           l



               14:40:                               Refill(s),           [facility]



               00                                 supplement           

 

     Nature's            Yes                      1,000 mg =           [facility]

[facility]



     Bounty Red      3-09                               2 caps,           l



     Krill Oil      14:40:                               Oral, BID,           [facility]

[facility]



     500 mg oral      00                                 0              



     capsule                                         Refill(s),           



                                                  supplement           

 

     aspirin 81            Yes                      81 mg = 1           [facility]

[facility]



     mg oral      3-                               tabs,           l



     tablet      14:40:                               Oral,           [facility]



               00                                 Daily, 0           



                                                  Refill(s),           



                                                  supplement           

 

     Axert 12.5            Yes                      12.5 mg =           [facility]

[facility]



     mg oral      3-09                               1 tabs,           l



     tablet      14:40:                               Oral,           [facility]



               00                                 Once, PRN           



                                                  for            



                                                  migraine           



                                                  headache,           



                                                  may repeat           



                                                  dose once           



                                                  in 2           



                                                  hours, # 6           



                                                  tabs, 0           



                                                  Refill(s),           



                                                  migrainesm           



                                                  ay repeat           



                                                  dose once           



                                                  in 2 hours           

 

     Wellbutrin            Yes                      300 mg,           [facility]

[facility]



     XL        3-09                               Oral,           l



               14:40:                               q24hr, 0           [facility]



               00                                 Refill(s),           



                                                  migraines           

 

     Seroquel            Yes                      100 mg,           [facility]

a



               3-09                               Oral,           l



               14:40:                               Daily, 0           [facility]



               00                                 Refill(s),           



                                                  migraines           

 

     acetaminoph            Yes                      1 tabs,           [facility]

[facility]



     en-HYDROcod      3-09                               Oral,           l



     one 325      14:40:                               q6hr, 0           [facility]



     mg-5 mg      00                                 Refill(s),           



     oral tablet                                         pain           

 

     traMADol 50            Yes                      50 mg = 1           [facility]

[facility]



     mg oral      3-                               tabs,           l



     tablet      14:40:                               Oral,           [facility]



               00                                 q4hr, 0           



                                                  Refill(s),           



                                                  pain           

 

     amLODIPine-            Yes                      1 tabs,           [facility]

[facility]



     atorvastati      3-09                               Oral, qHS,           l



     n 5 mg-40      14:40:                               0              [facility]



     mg oral      00                                 Refill(s),           



     tablet                                         cholestero           



                                                  l/hyperten           



                                                  alexx           

 

     Osteo            Yes                      Oral,           [facility]



     Bi-Flex      3-09                               Daily, 0           l



               14:40:                               Refill(s),           [facility]



               00                                 supplement           

 

     Nature's            Yes                      1,000 mg =           [facility]

[facility]



     Bounty Red      3-09                               2 caps,           l



     Krill Oil      14:40:                               Oral, BID,           [facility]

[facility]



     500 mg oral      00                                 0              



     capsule                                         Refill(s),           



                                                  supplement           

 

     aspirin 81            Yes                      81 mg = 1           [facility]

[facility]



     mg oral      3-09                               tabs,           l



     tablet      14:40:                               Oral,           [facility]



               00                                 Daily, 0           



                                                  Refill(s),           



                                                  supplement           

 

     Axert 12.5            Yes                      12.5 mg =           [facility]

[facility]



     mg oral      3-09                               1 tabs,           l



     tablet      14:40:                               Oral,           [facility]



               00                                 Once, PRN           



                                                  for            



                                                  migraine           



                                                  headache,           



                                                  may repeat           



                                                  dose once           



                                                  in 2           



                                                  hours, # 6           



                                                  tabs, 0           



                                                  Refill(s),           



                                                  migrainesm           



                                                  ay repeat           



                                                  dose once           



                                                  in 2 hours           

 

     Wellbutrin            Yes                      300 mg,           [facility]

[facility]



     XL        3-09                               Oral,           l



               14:40:                               q24hr, 0           [facility]



               00                                 Refill(s),           



                                                  migraines           

 

     Seroquel            Yes                      100 mg,           [facility]

a



               3-09                               Oral,           l



               14:40:                               Daily, 0           [facility]



               00                                 Refill(s),           



                                                  migraines           

 

     acetaminoph            Yes                      1 tabs,           [facility]

[facility]



     en-HYDROcod      3-09                               Oral,           l



     one 325      14:40:                               q6hr, 0           [facility]



     mg-5 mg      00                                 Refill(s),           



     oral tablet                                         pain           

 

     traMADol 50            Yes                      50 mg = 1           [facility]

[facility]



     mg oral      3-09                               tabs,           l



     tablet      14:40:                               Oral,           [facility]



               00                                 q4hr, 0           



                                                  Refill(s),           



                                                  pain           

 

     amLODIPine-            Yes                      1 tabs,           [facility]

[facility]



     atorvastati      3-09                               Oral, qHS,           l



     n 5 mg-40      14:40:                               0              [facility]



     mg oral      00                                 Refill(s),           



     tablet                                         cholestero           



                                                  l/hyperten           



                                                  alexx           

 

     Osteo            Yes                      Oral,           [facility]



     Bi-Flex      3-                               Daily, 0           l



               14:40:                               Refill(s),           [facility]



               00                                 supplement           

 

     Nature's            Yes                      1,000 mg =           [facility]

[facility]



     Bounty Red      3-09                               2 caps,           l



     Krill Oil      14:40:                               Oral, BID,           [facility]

[facility]



     500 mg oral      00                                 0              



     capsule                                         Refill(s),           



                                                  supplement           

 

     aspirin 81            Yes                      81 mg = 1           [facility]

[facility]



     mg oral      3-09                               tabs,           l



     tablet      14:40:                               Oral,           [facility]



               00                                 Daily, 0           



                                                  Refill(s),           



                                                  supplement           

 

     Axert 12.5            Yes                      12.5 mg =           [facility]

[facility]



     mg oral      3-09                               1 tabs,           l



     tablet      14:40:                               Oral,           [facility]



               00                                 Once, PRN           



                                                  for            



                                                  migraine           



                                                  headache,           



                                                  may repeat           



                                                  dose once           



                                                  in 2           



                                                  hours, # 6           



                                                  tabs, 0           



                                                  Refill(s),           



                                                  migrainesm           



                                                  ay repeat           



                                                  dose once           



                                                  in 2 hours           

 

     Wellbutrin            Yes                      300 mg,           [facility]

[facility]



     XL        3-                               Oral,           l



               14:40:                               q24hr, 0           [facility]



               00                                 Refill(s),           



                                                  migraines           

 

     Seroquel            Yes                      100 mg,           [facility]

[facility]



               3-                               Oral,           l



               14:40:                               Daily, 0           [facility]



               00                                 Refill(s),           



                                                  migraines           

 

     acetaminoph            Yes                      1 tabs,           [facility]

[facility]



     en-HYDROcod      3-                               Oral,           l



     one 325      14:40:                               q6hr, 0           [facility]



     mg-5 mg      00                                 Refill(s),           



     oral tablet                                         pain           

 

     traMADol 50            Yes                      50 mg = 1           [facility]

[facility]



     mg oral      3-                               tabs,           l



     tablet      14:40:                               Oral,           [facility]



               00                                 q4hr, 0           



                                                  Refill(s),           



                                                  pain           

 

     amLODIPine-            Yes                      1 tabs,           [facility]

[facility]



     atorvastati      3-09                               Oral, qHS,           l



     n 5 mg-40      14:40:                               0              [facility]



     mg oral      00                                 Refill(s),           



     tablet                                         cholestero           



                                                  l/hyperten           



                                                  alexx           

 

     Osteo      -      Yes                      Oral,           [facility]



     Bi-Flex      3-                               Daily, 0           l



               14:40:                               Refill(s),           [facility]



               00                                 supplement           

 

     Nature's            Yes                      1,000 mg =           [facility]

[facility]



     Bounty Red      3-                               2 caps,           l



     Krill Oil      14:40:                               Oral, BID,           [facility]

[facility]



     500 mg oral      00                                 0              



     capsule                                         Refill(s),           



                                                  supplement           

 

     aspirin 81            Yes                      81 mg = 1           [facility]

[facility]



     mg oral      3-09                               tabs,           l



     tablet      14:40:                               Oral,           [facility]



               00                                 Daily, 0           



                                                  Refill(s),           



                                                  supplement           

 

     Axert 12.5            Yes                      12.5 mg =           [facility]

[facility]



     mg oral      3-09                               1 tabs,           l



     tablet      14:40:                               Oral,           [facility]



               00                                 Once, PRN           



                                                  for            



                                                  migraine           



                                                  headache,           



                                                  may repeat           



                                                  dose once           



                                                  in 2           



                                                  hours, # 6           



                                                  tabs, 0           



                                                  Refill(s),           



                                                  migrainesm           



                                                  ay repeat           



                                                  dose once           



                                                  in 2 hours           

 

     Wellbutrin            Yes                      300 mg,           [facility]

[facility]



     XL        3-                               Oral,           l



               14:40:                               q24hr, 0           [facility]



               00                                 Refill(s),           



                                                  migraines           

 

     Seroquel            Yes                      100 mg,           [facility]

[facility]



               3-                               Oral,           l



               14:40:                               Daily, 0           [facility]



               00                                 Refill(s),           



                                                  migraines           

 

     acetaminoph            Yes                      1 tabs,           [facility]

[facility]



     en-HYDROcod      3-                               Oral,           l



     one 325      14:40:                               q6hr, 0           [facility]



     mg-5 mg      00                                 Refill(s),           



     oral tablet                                         pain           

 

     traMADol 50            Yes                      50 mg = 1           [facility]

[facility]



     mg oral      3-                               tabs,           l



     tablet      14:40:                               Oral,           [facility]



               00                                 q4hr, 0           



                                                  Refill(s),           



                                                  pain           

 

     amLODIPine-            Yes                      1 tabs,           [facility]

[facility]



     atorvastati      3-                               Oral, qHS,           l



     n 5 mg-40      14:40:                               0              [facility]



     mg oral      00                                 Refill(s),           



     tablet                                         cholestero           



                                                  l/hyperten           



                                                  alexx           

 

     Osteo      2016-      Yes                      Oral,           [facility]



     Bi-Flex      3-09                               Daily, 0           l



               14:40:                               Refill(s),           [facility]



               00                                 supplement           

 

     Nature's            Yes                      1,000 mg =           [facility]

[facility]



     Bounty Red      3-09                               2 caps,           l



     Krill Oil      14:40:                               Oral, BID,           [facility]

[facility]



     500 mg oral      00                                 0              



     capsule                                         Refill(s),           



                                                  supplement           

 

     aspirin 81            Yes                      81 mg = 1           [facility]

[facility]



     mg oral      3-09                               tabs,           l



     tablet      14:40:                               Oral,           [facility]



               00                                 Daily, 0           



                                                  Refill(s),           



                                                  supplement           

 

     Axert 12.5            Yes                      12.5 mg =           [facility]

[facility]



     mg oral      3-09                               1 tabs,           l



     tablet      14:40:                               Oral,           [facility]



               00                                 Once, PRN           



                                                  for            



                                                  migraine           



                                                  headache,           



                                                  may repeat           



                                                  dose once           



                                                  in 2           



                                                  hours, # 6           



                                                  tabs, 0           



                                                  Refill(s),           



                                                  migrainesm           



                                                  ay repeat           



                                                  dose once           



                                                  in 2 hours           

 

     Wellbutrin            Yes                      300 mg,           [facility]

[facility]



     XL        3-                               Oral,           l



               14:40:                               q24hr, 0           [facility]



               00                                 Refill(s),           



                                                  migraines           

 

     Seroquel            Yes                      100 mg,           [facility]

[facility]



               3-09                               Oral,           l



               14:40:                               Daily, 0           [facility]



               00                                 Refill(s),           



                                                  migraines           

 

     acetaminoph            Yes                      1 tabs,           [facility]

[facility]



     en-HYDROcod      3-09                               Oral,           l



     one 325      14:40:                               q6hr, 0           [facility]



     mg-5 mg      00                                 Refill(s),           



     oral tablet                                         pain           

 

     traMADol 50            Yes                      50 mg = 1           [facility]

[facility]



     mg oral      3-                               tabs,           l



     tablet      14:40:                               Oral,           [facility]



               00                                 q4hr, 0           



                                                  Refill(s),           



                                                  pain           

 

     amLODIPine-            Yes                      1 tabs,           [facility]

[facility]



     atorvastati      3-                               Oral, qHS,           l



     n 5 mg-40      14:40:                               0              [facility]



     mg oral      00                                 Refill(s),           



     tablet                                         cholestero           



                                                  l/hyperten           



                                                  alexx           

 

     Osteo            Yes                      Oral,           [facility]



     Bi-Flex      3-                               Daily, 0           l



               14:40:                               Refill(s),           [facility]



               00                                 supplement           

 

     Nature's            Yes                      1,000 mg =           [facility]

[facility]



     Bounty Red      3-09                               2 caps,           l



     Krill Oil      14:40:                               Oral, BID,           [facility]

[facility]



     500 mg oral      00                                 0              



     capsule                                         Refill(s),           



                                                  supplement           

 

     aspirin 81            Yes                      81 mg = 1           [facility]

[facility]



     mg oral      3-09                               tabs,           l



     tablet      14:40:                               Oral,           [facility]



               00                                 Daily, 0           



                                                  Refill(s),           



                                                  supplement           

 

     Axert 12.5            Yes                      12.5 mg =           [facility]

[facility]



     mg oral      3-09                               1 tabs,           l



     tablet      14:40:                               Oral,           [facility]



               00                                 Once, PRN           



                                                  for            



                                                  migraine           



                                                  headache,           



                                                  may repeat           



                                                  dose once           



                                                  in 2           



                                                  hours, # 6           



                                                  tabs, 0           



                                                  Refill(s),           



                                                  migrainesm           



                                                  ay repeat           



                                                  dose once           



                                                  in 2 hours           

 

     Wellbutrin            Yes                      300 mg,           [facility]

[facility]



     XL        3-09                               Oral,           l



               14:40:                               q24hr, 0           [facility]



               00                                 Refill(s),           



                                                  migraines           

 

     Seroquel            Yes                      100 mg,           [facility]

[facility]



               3-09                               Oral,           l



               14:40:                               Daily, 0           [facility]



               00                                 Refill(s),           



                                                  migraines           

 

     acetaminoph            Yes                      1 tabs,           [facility]

[facility]



     en-HYDROcod      3-09                               Oral,           l



     one 325      14:40:                               q6hr, 0           [facility]



     mg-5 mg      00                                 Refill(s),           



     oral tablet                                         pain           

 

     traMADol 50            Yes                      50 mg = 1           [facility]

[facility]



     mg oral      3-09                               tabs,           l



     tablet      14:40:                               Oral,           [facility]



               00                                 q4hr, 0           



                                                  Refill(s),           



                                                  pain           

 

     amLODIPine-            Yes                      1 tabs,           [facility]

[facility]



     atorvastati      3-09                               Oral, qHS,           l



     n 5 mg-40      14:40:                               0              [facility]



     mg oral      00                                 Refill(s),           



     tablet                                         cholestero           



                                                  l/hyperten           



                                                  alexx           

 

     Osteo            Yes                      Oral,           [facility]



     Bi-Flex      3-09                               Daily, 0           l



               14:40:                               Refill(s),           [facility]



               00                                 supplement           

 

     Nature's            Yes                      1,000 mg =           [facility]

[facility]



     Bounty Red      3-09                               2 caps,           l



     Krill Oil      14:40:                               Oral, BID,           [facility]

[facility]



     500 mg oral      00                                 0              



     capsule                                         Refill(s),           



                                                  supplement           

 

     aspirin 81            Yes                      81 mg = 1           [facility]

[facility]



     mg oral      3-09                               tabs,           l



     tablet      14:40:                               Oral,           [facility]



               00                                 Daily, 0           



                                                  Refill(s),           



                                                  supplement           

 

     Axert 12.5            Yes                      12.5 mg =           [facility]

[facility]



     mg oral      3-09                               1 tabs,           l



     tablet      14:40:                               Oral,           [facility]



               00                                 Once, PRN           



                                                  for            



                                                  migraine           



                                                  headache,           



                                                  may repeat           



                                                  dose once           



                                                  in 2           



                                                  hours, # 6           



                                                  tabs, 0           



                                                  Refill(s),           



                                                  migrainesm           



                                                  ay repeat           



                                                  dose once           



                                                  in 2 hours           

 

     Wellbutrin            Yes                      300 mg,           [facility]

[facility]



     XL        3-09                               Oral,           l



               14:40:                               q24hr, 0           [facility]



               00                                 Refill(s),           



                                                  migraines           







Immunizations







           Ordered Immunization Filled Immunization Date       Status     Commen

ts   Source



           Name       Name                                        

 

           FLUCELVAX QUAD PF            2022 Completed             [facility]

 

           FLUCELVAX QUAD PF            2022 Completed             [facility]



                                 [facility]

 

           FLUCELVAX QUAD PF            2022 Completed             [facility]



                                 [facility]

 

           Bebtelovimab            2022 Completed             [facility]



                                 00:00:00                         [facility]

 

           Bebtelovimab            2022 Completed             [facility]



                                 00:00:00                         [facility]

 

           Bebtelovimab            2022 Completed             [facility]



                                 [facility]:00                         [facility]

 

           FLUCELVAX QUAD PF            2021-10-05 Completed             [facility]



                                 [facility]                         [facility]

 

           FLUCELVAX QUAD PF            2021-10-05 Completed             [facility]



                                 [facility]                         [facility]

 

           FLUCELVAX QUAD PF            2021-10-05 Completed             [facility]



                                 [facility]                         [facility]

 

           FLUCELVAX QUAD PF            2020 Completed             [facility]



                                 [facility]                         [facility]

 

           FLUCELVAX QUAD PF            2020 Completed             [facility]



                                 [facility]                         [facility]

 

           FLUCELVAX QUAD PF            2020 Completed             [facility]



                                 [facility]00                         [facility]

 

           Hx influenza            2019-10-23 Completed             [facility]



           vaccine-unspecified<            00:00:00                         



           sup>1</sup>                                             

 

           Hx influenza            2019-10-23 Completed             [facility]



           vaccine-unspecified<            00:00:00                         



           sup>1</sup>                                             

 

           Hx influenza            2019-10-23 Completed             [facility]



           vaccine-unspecified<            00:00:00                         



           sup>1</sup>                                             

 

           Hx influenza            2019-10-23 Completed             [facility]



           vaccine-unspecified<            00:00:00                         



           sup>1</sup>                                             

 

           Hx influenza            2019-10-23 Completed             [facility]



           vaccine-unspecified<            00:00:00                         



           sup>1</sup>                                             

 

           Hx influenza            2019-10-23 Completed             [facility]



           vaccine-unspecified<            00:00:00                         



           sup>1</sup>                                             

 

           Hx influenza            2019-10-23 Completed             [facility]



           vaccine-unspecified<            00:00:00                         



           sup>1</sup>                                             

 

           Hx influenza            2019-10-23 Completed             [facility]



           vaccine-unspecified<            00:00:00                         



           sup>1</sup>                                             

 

           FLUCELVAX QUAD PF            2019-10-23 Completed             [facility]

 

           FLUCELVAX QUAD PF            2019-10-23 Completed             [facility]



                                 [facility]                         [facility]

 

           Hx influenza            2019-10-23 Completed             [facility]



           vaccine-unspecified<            00:00:00                         



           sup>1</sup>                                             

 

           FLUCELVAX QUAD PF            2019-10-23 Completed             [facility]



                                 [facility]

 

           Tdap                  2019 Completed             [facility]



                                 [facility]                         [facility]

 

           Tdap                  2019 Completed             [facility]



                                 [facility]                         [facility]

 

           Tdap                  2019 Completed             [facility]



                                 [facility]00                         [facility]

 

           pneumococcal            2019 Completed             [facility]



           23-valent             00:00:00                         



           vaccine<sup>3</sup>                                             

 

           pneumococcal            2019 Completed             [facility]



           23-valent             00:00:00                         



           vaccine<sup>3</sup>                                             

 

           pneumococcal            2019 Completed             [facility]



           23-valent             00:00:00                         



           vaccine<sup>3</sup>                                             

 

           pneumococcal            2019 Completed             [facility]



           23-valent             00:00:00                         



           vaccine<sup>3</sup>                                             

 

           pneumococcal            2019 Completed             [facility]



           23-valent             00:00:00                         



           vaccine<sup>3</sup>                                             

 

           pneumococcal            2019 Completed             [facility]



           23-valent             00:00:00                         



           vaccine<sup>3</sup>                                             

 

           pneumococcal            2019 Completed             [facility]



           23-valent             00:00:00                         



           vaccine<sup>3</sup>                                             

 

           pneumococcal            2019 Completed             [facility]



           23-valent             00:00:00                         



           vaccine<sup>3</sup>                                             

 

           pneumococcal            2019 Completed             [facility]



           23-valent             00:00:00                         



           vaccine<sup>3</sup>                                             

 

           Hx influenza            2011-10-25 Completed             [facility]



           vaccine-unspecified<            14:42:42                         



           sup>1</sup>                                             

 

           Hx influenza            2011-10-25 Completed             [facility]



           vaccine-unspecified<            14:42:42                         



           sup>2</sup>                                             

 

           Hx influenza            2011-10-25 Completed             [facility]



           vaccine-unspecified<            14:42:42                         



           sup>1</sup>                                             

 

           Hx influenza            2011-10-25 Completed             [facility]



           vaccine-unspecified<            14:42:42                         



           sup>2</sup>                                             

 

           Hx influenza            2011-10-25 Completed             [facility]



           vaccine-unspecified<            14:42:42                         



           sup>1</sup>                                             

 

           Hx influenza            2011-10-25 Completed             [facility]



           vaccine-unspecified<            14:42:42                         



           sup>2</sup>                                             

 

           Hx influenza            2011-10-25 Completed             [facility]



           vaccine-unspecified<            14:42:42                         



           sup>1</sup>                                             

 

           Hx influenza            2011-10-25 Completed             [facility]



           vaccine-unspecified<            14:42:42                         



           sup>2</sup>                                             

 

           Hx influenza            2011-10-25 Completed             [facility]



           vaccine-unspecified<            14:42:42                         



           sup>1</sup>                                             

 

           Hx influenza            2011-10-25 Completed             [facility]



           vaccine-unspecified<            14:42:42                         



           sup>2</sup>                                             

 

           Hx influenza            2011-10-25 Completed             [facility]



           vaccine-unspecified<            14:42:42                         



           sup>1</sup>                                             

 

           Hx influenza            2011-10-25 Completed             [facility]



           vaccine-unspecified<            14:42:42                         



           sup>2</sup>                                             

 

           Hx influenza            2011-10-25 Completed             [facility]



           vaccine-unspecified<            14:42:42                         



           sup>1</sup>                                             

 

           Hx influenza            2011-10-25 Completed             [facility]



           vaccine-unspecified<            14:42:42                         



           sup>2</sup>                                             

 

           Hx influenza            2011-10-25 Completed             [facility]



           vaccine-unspecified<            14:42:42                         



           sup>1</sup>                                             

 

           Hx influenza            2011-10-25 Completed             [facility]



           vaccine-unspecified<            14:42:42                         



           sup>2</sup>                                             

 

           Hx influenza            2011-10-25 Completed             [facility]



           vaccine-unspecified<            14:42:42                         



           sup>1</sup>                                             

 

           Hx influenza            2011-10-25 Completed             [facility]

[facility]



           vaccine-unspecified<            14:42:42                         



           sup>2</sup>                                             







Vital Signs







             Vital Name   Observation Time Observation Value Comments     Source

 

             WEIGHT       2022 11:28:00 98.2 kg                   

 

             WEIGHT       2022 07:30:00 101 kg                    

 

             HEIGHT       2022 16:00:00 170.2 cm                  

 

             WEIGHT       2022 16:00:00 98.9 kg                   

 

             WEIGHT       2022 11:28:00 98.2 kg                   

 

             WEIGHT       2022 07:30:00 101 kg                    

 

             HEIGHT       2022 16:00:00 170.2 cm                  

 

             WEIGHT       2022 16:00:00 98.9 kg                   

 

             WEIGHT       2022 11:28:00 98.2 kg                   

 

             WEIGHT       2022 07:30:00 101 kg                    

 

             HEIGHT       2022 16:00:00 170.2 cm                  

 

             WEIGHT       2022 16:00:00 98.9 kg                   

 

             Systolic blood 2022 08:05:00 133 mm[Hg]                [facility]

 

             Diastolic blood 2022 08:05:00 87 mm[Hg]                 [facility]

 

             Heart rate   2022 08:05:00 104 /min                  [facility]

 

             Body temperature 2022 08:05:00 35.56 Sherlyn                 [facility]

 

             Respiratory rate 2022 08:05:00 20 /min                   [facility]

 

             Oxygen saturation in 2022 08:05:00 98 /min                   

[facility]



             Arterial blood by                                        [facility]

nter



             Pulse oximetry                                        

 

             Body weight  2022 11:28:00 98.2 kg                   [facility]

 

             BMI          2022 11:28:00 33.91 kg/m2               [facility]

 

             Body height  2022 16:00:00 170.2 cm                  [facility]

 

             Body height  2022 16:22:00 170.2 cm                  [facility]

 

             Body weight  2022 16:22:00 97.523 kg                 [facility]

 

             BMI          2022 16:22:00 33.67 kg/m2               [facility]

 

             Systolic blood 2022 21:24:34 114 mm[Hg]                [facility]



             pressure                                            

 

             Diastolic blood 2022 21:24:34 57 mm[Hg]                 [facility]

[facility]



             pressure                                            

 

             Heart rate   2022 21:24:34 87 /min                   [facility]

 

             Body temperature 2022 21:24:34 36.61 Sherlyn                 [facility]

 

             Respiratory rate 2022 21:24:34 18 /min                   [facility]

 

             Oxygen saturation in 2022 21:24:34 100 /min                  

[facility]



             Arterial blood by                                        



             Pulse oximetry                                        

 

             Body height  2022 05:00:00 170.2 cm                  [facility]

 

             Body weight  2022 05:00:00 107.2 kg                  [facility]

 

             BMI          2022 05:00:00 37.02 kg/m2               [facility]

 

             Temperature Oral (F) 2022 19:52:00 97.6 F                    

[facility]

 

             Height       2022 19:52:00 170.18 cm                 [facility]

 

             Weight       2022 19:52:00                           [facility]

 

             BMI Calculated 2022 19:52:00                           [facility]

 

             Heart Rate   2021 21:23:00                           [facility]

 

             Systolic (mm Hg) 2021 21:23:00                           [facility]

 

             Diastolic (mm Hg) 2021 21:23:00                           [facility]

 

             Height       2021 21:23:00 165.1 cm                  [facility]

 

             Weight       2021 21:23:00                           [facility]

 

             BMI Calculated 2021 21:23:00                           [facility]

 

             Heart Rate   2021 20:12:00                           [facility]

 

             Heart Rate   2021 20:08:00                           [facility]

 

             Systolic (mm Hg) 2021 20:08:00                           [facility]

 

             Diastolic (mm Hg) 2021 20:08:00                           [facility]

 

             Height       2021 20:08:00 165.1 cm                  [facility]

 

             Weight       2021 20:08:00                           [facility]

 

             BMI Calculated 2021 20:08:00                           [facility]

 

             Systolic (mm Hg) 2020 15:59:00                           [facility]

 

             Diastolic (mm Hg) 2020 15:59:00                           [facility]

 

             Heart Rate   2020 15:59:00                           [facility]

 

             Height       2020 15:59:00 165.1 cm                  [facility]

 

             Weight       2020 15:59:00                           [facility]

 

             BMI Calculated 2020 15:59:00                           [facility]

 

             Systolic (mm Hg) 2020 20:42:00                           [facility]

 

             Diastolic (mm Hg) 2020 20:42:00                           [facility]

 

             Heart Rate   2020 20:42:00                           [facility]

 

             Temperature Oral (F) 2020 20:42:00 98.2 F                    

[facility]

 

             Height       2020 20:42:00 170.18 cm                 [facility]

 

             Weight       2020 20:42:00                           [facility]

 

             BMI Calculated 2020 20:42:00                           [facility]

 

             Systolic (mm Hg) 2019 21:53:00                           [facility]

 

             Diastolic (mm Hg) 2019 21:53:00                           [facility]

 

             Heart Rate   2019 21:53:00                           [facility]

 

             Temperature Oral (F) 2019 21:53:00 97.9 F                    

[facility]

 

             Height       2019 21:53:00 165.1 cm                  [facility]

 

             Weight       2019 21:53:00                           [facility]

 

             BMI Calculated 2019 21:53:00                           [facility]

 

             Height       2019-10-25 14:54:00 165.1 cm                  [facility]

 

             Weight       2019-10-25 14:54:00                           [facility]

 

             BMI Calculated 2019-10-25 14:54:00                           [facility]

 

             Systolic (mm Hg) 2019-10-25 14:54:00                           [facility]

 

             Diastolic (mm Hg) 2019-10-25 14:54:00                           [facility]

 

             Heart Rate   2019-10-25 14:54:00                           [facility]

 

             Temperature Oral (F) 2019-10-25 14:54:00 97.6 F                    

[facility]

 

             Systolic (mm Hg) 2019-10-10 15:37:00                           [facility]

 

             Diastolic (mm Hg) 2019-10-10 15:37:00                           [facility]

 

             Heart Rate   2019-10-10 15:37:00                           [facility]

 

             Temperature Oral (F) 2019-10-10 15:37:00 98.2 F                    

[facility]

 

             Height       2019-10-10 15:37:00 165.1 cm                  [facility]

 

             Weight       2019-10-10 15:37:00                           [facility]

 

             BMI Calculated 2019-10-10 15:37:00                           [facility]

 

             Weight       2019 15:18:00                           [facility]

 

             BMI Calculated 2019 15:18:00                           [facility]

 

             Height       2019 15:18:00 165.1 cm                  [facility]

 

             Temperature Oral (F) 2019 15:18:00 98.4 F                    

[facility]

 

             Heart Rate   2019 15:18:00                           [facility]

 

             Systolic (mm Hg) 2019 15:18:00                           [facility]

 

             Diastolic (mm Hg) 2019 15:18:00                           [facility]

 

             Height       2019 19:16:00 165.1 cm                  [facility]

 

             BMI Calculated 2019 19:16:00                           [facility]

 

             Weight       2019 19:16:00                           [facility]

 

             Heart Rate   2019 19:16:00                           [facility]

 

             Systolic (mm Hg) 2019 19:16:00                           [facility]

 

             Diastolic (mm Hg) 2019 19:16:00                           [facility]

 

             Height       2019 14:26:00 167.01 cm                 [facility]

 

             BMI Calculated 2019 14:26:00                           [facility]

 

             Weight       2019 14:26:00                           [facility]

 

             Heart Rate   2019 14:26:00                           [facility]

 

             Temperature Oral (F) 2019 14:26:00 97.9 F                    

[facility]

 

             Systolic (mm Hg) 2019 14:26:00                           [facility]

 

             Diastolic (mm Hg) 2019 14:26:00                           [facility]

 

             Systolic (mm Hg) 2018 18:33:00                           [facility]

 

             Diastolic (mm Hg) 2018 18:33:00                           [facility]

 

             Heart Rate   2018 18:33:00                           [facility]

 

             Weight       2018 18:33:00                           [facility]

 

             BMI Calculated 2018 18:31:00                           [facility]

 

             Weight       2018 18:31:00                           [facility]

 

             Systolic (mm Hg) 2018 18:31:00                           [facility]

 

             Diastolic (mm Hg) 2018 18:31:00                           [facility]

 

             Height       2018 18:31:00 170.18 cm                 [facility]

 

             Temperature Oral (F) 2018 18:31:00 98.3 F                    

[facility]

 

             Heart Rate   2018 18:31:00                           [facility]

 

             Weight       2018 16:14:00                           [facility]

 

             Height       2018 16:14:00 170.18 cm                 [facility]

 

             BMI Calculated 2018 16:14:00                           [facility]

 

             Heart Rate   2018 16:14:00                           [facility]

 

             Systolic (mm Hg) 2018 16:14:00                           [facility]

 

             Diastolic (mm Hg) 2018 16:14:00                           [facility]

 

             BMI Calculated 2018 15:06:00                           [facility]

 

             Height       2018 15:06:00 170.18 cm                 [facility]

 

             Weight       2018 15:06:00                           [facility]

 

             Systolic (mm Hg) 2018 15:06:00                           [facility]

 

             Diastolic (mm Hg) 2018 15:06:00                           [facility]

 

             Heart Rate   2018 15:06:00                           [facility]

 

             Temperature Oral (F) 2018 15:06:00 97.9 F                    

[facility]

 

             Weight       2017 16:17:00                           [facility]

 

             Height       2017 16:17:00 170.18 cm                 [facility]

 

             BMI Calculated 2017 16:17:00                           [facility]

 

             Respitory Rate 2016 01:25:00                           [facility]

 

             Systolic (mm Hg) 2016 00:50:00                           [facility]

 

             Respitory Rate 2016 00:50:00                           [facility]

 

             Heart Rate   2016 00:50:00                           [facility]

 

             Systolic (mm Hg) 2016 00:40:00                           [facility]

 

             Respitory Rate 2016 00:40:00                           [facility]

 

             Heart Rate   2016 00:40:00                           [facility]

 

             Heart Rate   2016 00:30:00                           [facility]

 

             Systolic (mm Hg) 2016 00:30:00                           [facility]

 

             Temperature Oral (F) 2016-03-10 23:50:00 37.1 Sherlyn                  

[facility]

 

             Height       2016-03-10 19:23:00 169 cm                    [facility]

 

             Weight       2016-03-10 19:23:00                           [facility]

 

             Temperature Oral (F) 2016-03-10 19:23:00 36.6 Sherlyn                  

[facility]

 

             Height       2016 14:13:00 170 cm                    [facility]

 

             Weight       2016 14:13:00                           [facility]







Procedures







                Procedure       Date / Time     Performing Clinician Source



                                Performed                       

 

                ULTRAFILTRATION HD CRRT 2022 18:40:00 Jeanie Canada

 [facility]

 

                ECG 12-LEAD     2022 10:57:31 Unknown, 7 [facility] [facility]

 

                ECG 12-LEAD     2022 10:57:31 Unknown, 7 [facility]

 

                NM MYOCARDIAL PERFUSION 2022 10:55:00 Luma Beckham  [facility]



                PET/CT (REST & STRESS)                                 Medical C

enter

 

                TREADMILL       2022 10:49:30 Unknown, 7 [facility]



                TOLERANCE(NON-NUCLEAR                                 Medical Ce

nter



                TREADMILL)                                      

 

                ECG 12-LEAD     2022 10:49:10 Unknown, 7 [facility]

 

                ECG 12-LEAD     2022 10:49:10 Unknown, 7 [facility]

 

                CBC W/PLT COUNT & AUTO 2022 03:40:00 Jerzy [facility]

 

                BASIC METABOLIC PANEL 2022 03:40:00 Jerzy [facility]

 

                MAGNESIUM       2022 03:40:00 Jerzy [facility]

 

                PHOSPHORUS      2022 03:40:00 Jerzy [facility]

 

                CBC W/PLT COUNT & AUTO 2022 03:40:00 David Godwin [facility]

 

                HEPATITIS B SURFACE 2022 08:07:00 Capo Squires  [facility]

 

                HEMODIALYSIS INPATIENT 2022 07:15:26 Bo Corado [facility]

 

                CBC W/PLT COUNT & AUTO 2022 04:40:00 Vinayak Broussard  [facility]

 

                BASIC METABOLIC PANEL 2022 04:40:00 Jerzy [facility]

 

                MAGNESIUM       2022 04:40:00 Vinayak Broussard  [facility]

 

                PHOSPHORUS      2022 04:40:00 Jerzy [facility]

 

                HEPATITIS C ANTIBODY 2022 04:40:00 Sanket [facility]

 

                PERIPHERAL BLOOD SMEAR - 2022 04:40:00 Sanket Marshall [facility]



                PATHOLOGIST REVIEW                                 Medical [facility]

 

                CBC W/PLT COUNT & AUTO 2022 04:40:00 David Godwin [facility]

 

                2D ECHO W/ DOPPLER 2022 15:49:57 David Godwin [facility]



                (CW/PW/COLOR)                                   [facility]

 

                CBC W/PLT COUNT & AUTO 2022 04:30:00 David Godwin [facility]

 

                CBC W/PLT COUNT & AUTO 2022 04:30:00 Vinayak Broussard  [facility]

 

                BASIC METABOLIC PANEL 2022 04:30:00 Vinayak Broussard  [facility]

 

                MAGNESIUM       2022 04:30:00 Jerzy [facility]

 

                PHOSPHORUS      2022 04:30:00 Jerzy [facility]

 

                PROTEIN ELECTROPHORESIS, 2022 04:30:00 Luma Beckham  [facility]

 

                HIGH SENSITIVITY TROPONIN 2022 18:09:00 David Godwin [facility]

 

                POTASSIUM       2022 18:09:00 Jerzy [facility]

 

                XR CHEST 1 VIEW PORTABLE / 2022 15:31:00 Celli, David Raymundoeduardo

wendy [facility]

 

                CBC W/PLT COUNT & AUTO 2022 15:15:00 Celli, David Henderson

ekaterina [facility]

 

                COMPREHENSIVE METABOLIC 2022 15:15:00 Celli, David Abdul

rufus [facility]

 

                MAGNESIUM       2022 15:15:00 Celli, David Field [facility]

 

                PHOSPHORUS      2022 15:15:00 [facility]

 

                HIGH SENSITIVITY TROPONIN 2022 15:15:00 Celli, David Oviedo

[facility]

 

                B-TYPE NATRIURETIC FACTOR 2022 15:15:00 Celli, David Oviedo

[facility]



                (BNP)                                           [facility]

 

                CBC W/PLT COUNT & AUTO 2022 15:15:00 Celli, David Henderson

ekaterina [facility]

 

                TSH             2022 15:14:00 Celli, David Field [facility]

 

                ECG 12-LEAD     2022 14:50:19 Unknown, [facility]

 

                ECG 12-LEAD     2022 14:50:19 Unknown, [facility]

 

                ECG 12-LEAD     2022 14:50:19 Unknown, [facility]

 

                PROTHROMBIN TIME WITH INR 2022 06:57:00 Faye Mitchell   [facility] METABOLIC 2022 06:57:00 Faye Mitchell

[facility]



                PANEL                                           

 

                MAGNESIUM LEVEL 2022 06:57:00 Faye Mitchell

spital

 

                PHOSPHORUS LEVEL 2022 06:57:00 Faye Mitchell

ospital

 

                ESTIMATED GFR   2022 06:57:00 Faey Mitchell

spital

 

                TROPONIN T      2022 06:57:00 Faye Mitchell Ho

spital

 

                ZZCOVID-19 ANTI-SPIKE IGG 2022 06:56:00 [facility]



                ANTIBODY TITER                                  

 

                ZZCOVID-19 SEROLOGY 2022 06:56:00 [facility]   [facility]



                PATIENT SURVEILLANCE                                 

 

                CBC WITH PLATELET AND 2022 06:56:00 [facility]



                DIFFERENTIAL                                    

 

                COVID-19 QUALITATIVE 2022 04:58:00 [facility] Suresh [facility]



                RT-PCR                                          

 

                TROPONIN T      2022 03:58:00 [facility]

[facility]

 

                ECG ED PRELIMINARY 2022 03:37:43 [facility]



                INTERPRETATION                                  

 

                XR CHEST 1 VW PORTABLE 2022 01:31:25 [facility] METABOLIC 2022 01:20:00 [facility]



                PANEL                                           

 

                LIPASE LEVEL    2022 01:20:00 [facility]

 

                TROPONIN T      2022 01:20:00 [facility]

[facility]

 

                B NATRIURETIC PEPTIDE 2022 01:20:00 [facility]

 

                CBC WITH PLATELET AND 2022 01:20:00 [facility]



                DIFFERENTIAL                                    

 

                ESTIMATED GFR   2022 01:20:00 [facility]

 

                ECG 12-LEAD     2022 01:03:45 Faye Mitchell 

spital

 

                COMPREHENSIVE METABOLIC 2022 16:47:00 [facility] Sourav [facility]



                PANEL                           Baljinder       

 

                CBC WITH PLATELET AND 2022 16:47:00 [facility]



                DIFFERENTIAL                    Baljinder       

 

                THYROID STIMULATING 2022 16:47:00 [facility] Sourav [facility]



                HORMONE                         Baljinder       

 

                PARATHYROID HORMONE 2022 16:47:00 [facility]



                                                Baljinder       

 

                VITAMIN D 25 HYDROXY LEVEL 2022 16:47:00 SparksSourav [facility]



                                                Baljinder       

 

                NM BONE SCAN 3 PHASE 2022-10-05 18:11:00 [facility]



                                                Baljinder       

 

                BONE DENSITY    2022-10-05 14:02:48 Sourav Sparks [facility]



                                                Baljinder       

 

                BONE DENSITY PERIPHERAL 2022-10-05 14:02:48 Sourav Sparks [facility]



                                                Baljinder       

 

                POC GLUCOSE     2022 04:49:00 [facility]

 

                CBC HEMOGRAM    2022 10:05:00 Jassi, Sanket YING  [facility]

 

                BASIC METABOLIC PANEL 2022 10:05:00 [facility]

 

                ESTIMATED GFR   2022 10:05:00 Sourav Sparks [facility]       

 

                XR LUMBAR SPINE 2 OR 3 VW 2022 21:10:24 Jassi, Sanket K.  [facility]

 

                XR THORACIC SPINE 2 VW 2022 21:10:08 [facility]

 

                XR CHEST 1 VW PORTABLE 2022 19:05:29 AsaduddinMartina [facility]

 

                BASIC METABOLIC PANEL 2022 09:08:00 [facility]

 

                PHOSPHORUS LEVEL 2022 09:08:00 FishmanFrancine hooper [facility]

 

                ESTIMATED GFR   2022 09:08:00 Francine Fishman [facility]

 

                TTE COMPLETE, WO CONTRAST, 2022 15:40:58 Harsh Young

 [facility]



                W DOPPLER ([id number])                                 

 

                MRI THORACIC SPINE WO 2022 02:11:31 Sourav Sparks [facility]



                CONTRAST                        Baljinder       

 

                MRI LUMBAR SPINE WO 2022 01:29:36 Quinton Centeno [facility]



                CONTRAST                        [facility]   

 

                HEPATITIS B CORE ANTIBODY 2022 17:34:00 Francine [facility]



                TOTAL                                           

 

                HEPATITIS B SURFACE AB, 2022 17:34:00 Francine [facility]



                QUANTITATIVE                                    

 

                HEPATITIS B SURFACE 2022 17:34:00 Francine Fishman

ethodist 

Hospital



                ANTIGEN                                         

 

                COVID-19 QUALITATIVE 2022 16:35:00 Jacobo [facility]



                RT-PCR                          [facility]   

 

                COVID-19 OMICRON VARIANT 2022 16:35:00 [facility]



                QUALITATIVE RT-PCR                 [facility]   

 

                POC GLUCOSE     2022 16:35:00 Martina Douglass Ho

spital

 

                POC GLUCOSE     2022 15:09:00 [facility]   

 

                CT LUMBAR SPINE WO 2022 13:41:51 [facility]



                CONTRAST                        [facility]   

 

                CT RENAL STONE PROTOCOL 2022 13:41:38 [facility]   

 

                CBC WITH PLATELET AND 2022 12:44:00 [facility]



                DIFFERENTIAL                    [facility] 2022 12:44:00 [facility]



                PANEL                           [facility]   

 

                ESTIMATED GFR   2022 12:44:00 [facility]   

 

                [place] [facility] 2022 12:28:33 [facility]



                ILL/INJURED PATIENT INIT                 [facility]   



                30-74 MIN                                       

 

                HEPATITIS B SURFACE 2022 15:33:00                 [facility]



                ANTIGEN                                         [facility]

 

                HEPATITIS B SURFACE 2022 15:33:00                 [facility]

[facility]



                ANTIBODY                                        [facility] Center

 

                XR CHEST 1 VW PORTABLE 2022 13:25:18 Pamela Blanton    [facility]

 

                COMPREHENSIVE METABOLIC 2022 10:32:00 [facility]



                PANEL                                           

 

                CBC WITH PLATELET AND 2022 10:32:00 Shelby Calle [facility]



                DIFFERENTIAL                                    

 

                ESTIMATED GFR   2022 10:32:00 [facility]

 

                HEMODIALYSIS    2022 05:06:40 [facility]en Rosenthal 

[facility]        

 

                HEPATITIS B CORE ANTIBODY 2022 20:59:00 Galo [facility]



                TOTAL                           [facility]        

 

                HEPATITIS B CORE ANTIBODY 2022 20:59:00 Galo, [facility]



                IGM                             [facility]        

 

                HEPATITIS B SURFACE 2022 20:59:00 [facility]



                ANTIGEN                         [facility]        

 

                HEPATITIS B SURFACE AB, 2022 20:58:00 Galo [facility]



                QUANTITATIVE                    Gabriela        

 

                HEMODIALYSIS    2022 15:58:20 [facility]        

 

                TROPONIN T      2022 15:44:00 [facility]

 

                XR CHEST 1 VW PORTABLE 2022 11:41:07 [facility]

 

                TROPONIN T      2022 11:41:00 [facility]

 

                CBC WITH PLATELET AND 2022 11:41:00 [facility]



                DIFFERENTIAL                                    

 

                COMPREHENSIVE METABOLIC 2022 11:41:00 [facility]



                PANEL                                           

 

                PROTHROMBIN TIME WITH INR 2022 11:41:00 [facility]

 

                PARTIAL THROMBOPLASTIN 2022 11:41:00 [facility]



                TIME (PTT)                                      

 

                B NATRIURETIC PEPTIDE 2022 11:41:00 [facility]

 

                PROCALCITONIN   2022 11:41:00 [facility]

 

                CREATINE KINASE, TOTAL 2022 11:41:00 [facility]



                (CPK)                                           

 

                C-REACTIVE PROTEIN 2022 11:41:00 [facility]

 

                INTERLEUKIN 6   2022 11:41:00 [facility]

 

                FERRITIN LEVEL  2022 11:41:00 [facility]

 

                D-DIMER         2022 11:41:00 [facility]

 

                LDH             2022 11:41:00 [facility]

 

                FIBRINOGEN      2022 11:41:00 [facility]

 

                ESTIMATED GFR   2022 11:41:00 [facility]

 

                RESPIRATORY PATHOGEN PANEL 2022 09:45:00 Addison [facility]



                WITH COVID-19 RT-PCR                 All         

 

                XR CHEST 1 VW   2022 08:05:46 Louann Jaime 

sebastian Carrizales         

 

                BLOOD CULTURE, AEROBIC & 2022 07:49:00 Louann Jaime [facility]



                ANAEROBIC                       All         

 

                CBC WITH PLATELET AND 2022 07:49:00 [facility]



                DIFFERENTIAL                    All         

 

                COMPREHENSIVE METABOLIC 2022 07:49:00 Louann Jaime [facility]



                PANEL                           All         

 

                TROPONIN T      2022 07:49:00 [facility]

 

                B NATRIURETIC PEPTIDE 2022 07:49:00 [facility]



                                                All         

 

                ESTIMATED GFR   2022 07:49:00 Louann Jaime         

 

                ECG ED PRELIMINARY 2022 07:26:13 [facility]



                INTERPRETATION                  All         

 

                ECG 12-LEAD     2022 07:20:02 Louann Jaime 

sebastian Carrizales         

 

                CT ABDOMEN PELVIS WO 2022 23:38:27 Mahamed [facility]



                CONTRAST                                        

 

                CBC WITH PLATELET AND 2022 23:14:00 Mahamed [facility]



                DIFFERENTIAL                                    

 

                COMPREHENSIVE METABOLIC 2022 23:14:00 Mahamed [facility]



                PANEL                                           

 

                LIPASE LEVEL    2022 23:14:00 [facility]

 

                ESTIMATED GFR   2022 23:14:00 [facility]

 

                BASIC METABOLIC PANEL 2022 22:54:00 Baranowssandee-Dacalyssia, [facility]

[facility]



                                                Gabriela        

 

                ESTIMATED GFR   2022 22:54:00 Baranomargaka-Daca, [facility]

ospital



                                                Gabriela        

 

                POC GLUCOSE     2022 22:04:00 Trev Neves [facility]

 

                CT ANGIOGRAM PE CHEST 2022 00:23:56 Emilia Bonilla

[facility]

 

                IR VERTEBRO LUM UNI OR IVON 2022 19:25:00 Christine Saini

[facility]

 

                ECG 12-LEAD     2022 18:05:39 Maril[address] Noe [facility]

 

                BASIC METABOLIC PANEL 2022 10:16:00 Edinson [facility]

 

                CBC WITH PLATELET AND 2022 10:16:00 Edinson [facility]



                DIFFERENTIAL                                    

 

                PROTHROMBIN TIME WITH INR 2022 10:16:00 Trev Neves

[facility]

 

                ESTIMATED GFR   2022 10:16:00 Trev Neves Saúl [facility]

 

                TYPE AND SCREEN 2022 10:16:00 Trev Neves [facility]

 

                TROPONIN T      2022 02:33:00 Trev Neves [facility]

 

                HEMODIALYSIS    2022 01:59:31 Beth-Rosy, [facility]

ospital



                                                Gabriela        

 

                TTE COMPLETE, WO CONTRAST, 2022 00:15:00 Trev Neves

[facility]



                W DOPPLER ([id number])                                 

 

                TROPONIN T      2022 23:10:00 Edinson Trev Saúl [facility]

 

                TROPONIN T      2022 20:19:00 Trev Neves [facility]

 

                HEMODIALYSIS    2022 13:51:20 Baranomargaka-Daca, [facility]

ospital



                                                Gabriela        

 

                CBC WITH PLATELET AND 2022 11:10:00 Christine Saini 

[facility]



                DIFFERENTIAL                                    

 

                BASIC METABOLIC PANEL 2022 11:10:00 Parveen, [facility]

 

                PARATHYROID HORMONE 2022 11:10:00 Christine Saini [facility]

 

                VITAMIN D 25 HYDROXY LEVEL 2022 11:10:00 Christine Saini

[facility]

 

                DIGOXIN LEVEL   2022 11:10:00 [facility]

ospital



                                                Gabriela        

 

                PHOSPHORUS LEVEL 2022 11:10:00 [facility]



                                                Gabriela        

 

                ESTIMATED GFR   2022 11:10:00 Parveen [facility]

 

                CBC WITH PLATELET AND 2022 09:45:00 [facility]



                DIFFERENTIAL                                    

 

                BASIC METABOLIC PANEL 2022 09:45:00 [facility]

 

                PROTHROMBIN TIME WITH INR 2022 09:45:00 Parveen [facility]

 

                ESTIMATED GFR   2022 09:45:00 Parveen [facility]

 

                GASTROINTESTINAL PANEL 2022 22:47:00 [facility]

 

                XR CHEST 1 VW PORTABLE 2022 17:34:26 [facility]

 

                CBC WITH PLATELET AND 2022 10:01:00 [facility]



                DIFFERENTIAL                                    

 

                BASIC METABOLIC PANEL 2022 10:01:00 [facility]

 

                ESTIMATED GFR   2022 10:01:00 Parveen [facility]

 

                HEMODIALYSIS    2022 14:21:31 [facility]

ospital



                                                Gabriela        

 

                CBC WITH PLATELET AND 2022 10:11:00 [facility]



                DIFFERENTIAL                                    

 

                COMPREHENSIVE METABOLIC 2022 10:11:00 Parveen [facility]



                PANEL                                           

 

                ESTIMATED GFR   2022 10:11:00 [facility]

 

                MRI LUMBAR SPINE WO 2022 02:13:34 Shaikh [facility]



                CONTRAST                                        

 

                POTASSIUM LEVEL 2022 18:15:00 Shahidanolew-Rosy, [facility]

ospital



                                                Gabriela        

 

                HEPATITIS B SURFACE 2022 13:46:00 Baranowssandee-Daca, [facility]



                ANTIGEN                         Gabriela        

 

                HEPATITIS B SURFACE 2022 13:46:00 [facility], [facility]



                ANTIBODY                        Gabriela        

 

                TROPONIN T      2022 13:43:00 Rosalio Angulo Ho

spital

 

                HEMODIALYSIS    2022 13:16:08 Beth-Rosy, [facility]

ospital



                                                Gabriela        

 

                CBC WITH PLATELET AND 2022 11:21:00 Shaikh [facility]



                DIFFERENTIAL                                    

 

                PROTHROMBIN TIME WITH INR 2022 11:21:00 Shaikh [facility]

 

                COMPREHENSIVE METABOLIC 2022 11:21:[address] [facility]



                PANEL                                           

 

                THYROID STIMULATING 2022 11:21:00 Shaikh [facility]



                HORMONE                                         

 

                ESTIMATED GFR   2022 11:21:00 Rosalio Angulo 

spital

 

                ZZCOVID-19 ANTI-SPIKE IGG 2022 06:43:00 Angulo [facility]



                ANTIBODY TITER                                  

 

                ZZCOVID-19 SEROLOGY 2022 06:43:00 Angulo [facility]



                PATIENT SURVEILLANCE                                 

 

                TROPONIN T      2022 06:43:00 Rosalio Angulo 

spital

 

                PROCALCITONIN   2022 06:43:00 Rosalio Angulo 

spital

 

                URINE CULTURE   2022 05:18:00 Nadia Mendez 

spital

 

                COVID-19 QUALITATIVE 2022 04:41:00 Vanessa [facility]



                RT-PCR                                          

 

                URINALYSIS SCREEN AND 2022 04:41:00 Vanessa [facility]



                MICROSCOPY, WITH REFLEX TO                                 



                CULTURE                                         

 

                CT ABDOMEN PELVIS WO 2022 03:51:11 Nadia Mendez  [facility]



                CONTRAST                                        

 

                CT LUMBAR SPINE WO 2022 03:49:20 Vanessa [facility]



                CONTRAST                                        

 

                COMPREHENSIVE METABOLIC 2022 03:31:00 Nadia Mendez  [facility]



                PANEL                                           

 

                CBC WITH PLATELET AND 2022 03:31:00 Vanessa [facility]



                DIFFERENTIAL                                    

 

                ESTIMATED GFR   2022 [facility]31[facility]00 [facility]

[facility]

 

                XR CHEST 1 VW   2022 03:22:49 Nadia Mendez  [facility]

spital

 

                ECG ED PRELIMINARY 2022 02:44:33 Vanessa [facility]



                INTERPRETATION                                  

 

                ECG 12-LEAD     2022 02:40:03 Rosalio Angulo    [facility]

spital

 

                HEMODIALYSIS    2022 14:28:03 Baran[facility]aca[facility]



                                                Gabriela        

 

                BASIC METABOLIC PANEL 2022 03:17:00 Baranowska-Daca, [facility]



                                                Gabriela        

 

                ESTIMATED GFR   2022 03:17:00 [facility]

[facility]



                                                Gabriela        

 

                POC GLUCOSE     2022 02:29:00 Melissa Pickett [facility]



                                                R               

 

                IR VERTEBRO CERVICAL AND 2022 20:19:56 Melissa Pickett

[facility]



                THOR UNI OR IVON                 R               

 

                IR VERTEBROPLASTY EA ADDL 2022 20:19:56 Melissa Pick[facility]



                T OR L                          R               

 

                WV AN ELECTIVE  2022 19:47:00 [facility]



                ENDOTRACHEAL AIRWAY                                 

 

                HEMODIALYSIS    2022 20:14:38 [facility] 

[facility]



                                                Gabriela        

 

                BLOOD CULTURE, AEROBIC & 2022 20:28:00 Magy [facility]



                ANAEROBIC                                       

 

                URINE CULTURE   2022 23:56:00 [facility]

[facility]



                                                Gabriela        

 

                CT RENAL STONE PROTOCOL 2022 23:39:33 Baranowska-Daca, Met

[facility]        

 

                URINALYSIS SCREEN AND 2022 22:29:00 Beth-Rosy, [facility]



                MICROSCOPY, WITH REFLEX TO                 Gabriela        



                CULTURE                                         

 

                HEMODIALYSIS    2022 15:08:13 [facility]an[facility]



                                                Gabriela        

 

                HC COMPLETE BLD COUNT 2022 09:55:00 Génesis, [facility]



                W/AUTO DIFF                     R               

 

                BASIC METABOLIC PANEL 2022 09:55:00 [facility], [facility]



                                                R               

 

                PROTHROMBIN TIME WITH INR 2022 09:55:00 Génesis, Melissa Gtz

[facility]



                                                R               

 

                URIC ACID LEVEL 2022 09:55:00 Baranowska-Daca, [facility]

ospital



                                                Gabriela        

 

                DIGOXIN LEVEL   2022 09:55:00 Baranowska-Daca, [facility]

ospital



                                                Gabriela        

 

                CORTISOL LEVEL, AM 2022 09:55:00 Baranowska-Daca, [facility]        

 

                HEMOGLOBIN A1C  2022 09:55:00 [facility]



                                                Gabriela        

 

                AMYLASE LEVEL   2022 09:55:00 Baranowska-Daca, [facility]

ospital



                                                Gabriela        

 

                LIPASE LEVEL    2022 09:55:00 [facility]



                                                Gabriela        

 

                ESTIMATED GFR   2022 09:55:00 [facility]



                                                [facility]               

 

                VITAMIN D 25 HYDROXY LEVEL 2022 09:33:00 Baranowska-Samsona, 

[facility]        

 

                PARATHYROID HORMONE 2022 09:33:00 Baranowssandee-Rosy, [facility]        

 

                PHOSPHORUS LEVEL 2022 09:33:00 Baranowska-Daca, [facility]        

 

                XR SHOULDER 2+ VW RIGHT 2022 04:05:25 Beth-Rosy, 

[facility]        

 

                HEMODIALYSIS    2022 03:41:52 [facility]aca[facility]

[facility]



                                                Gabriela        

 

                MRI LUMBAR SPINE WO 2022 15:10:25 Bree Ivan [facility]



                CONTRAST                                        

 

                MRI THORACIC SPINE WO 2022 14:44:08 Bree Ivan [facility]



                CONTRAST                                        

 

                BASIC METABOLIC PANEL 2022 10:43:00 [facility], [facility]        

 

                ESTIMATED GFR   2022 10:43:00 [facility]ow[facility]Daca[facility]

[facility]



                                                Gabriela        

 

                BASIC METABOLIC PANEL 2022 22:26:00 [facility]aca, [facility]        

 

                ESTIMATED GFR   2022 22:26:00 [facility]aca[facility]

[facility]



                                                Gabriela        

 

                HEMODIALYSIS    2022 14:03:25 [facility]aca[facility]

[facility]



                                                Gabriela        

 

                HC COMPLETE BLD COUNT 2022 09:43:00 [facility]



                W/AUTO DIFF                     Tajdin          

 

                COMPREHENSIVE METABOLIC 2022 09:43:00 [facility]



                PANEL                           Tajdin          

 

                MAGNESIUM LEVEL 2022 09:43:00 [facility]



                                                Tajdin          

 

                ESTIMATED GFR   2022 09[facility]43[facility]00 [facility]          

 

                TROPONIN T      2022 00:24:00 Tone [facility]

 

                HEPATITIS B SURFACE 2022 00:24:00 Beth-Samsona, [facility]



                ANTIGEN                         [facility]        

 

                HEPATITIS B SURFACE AB, 2022 00:24:00 Barankushwska-Daca [facility]



                QUANTITATIVE                    Gabriela        

 

                HEMODIALYSIS    2022 22:22:37 [facility]owska[facility]Daca[facility]

[facility]



                                                Gabriela        

 

                THYROID [facility]ULATING 2022 [facility]:32:00 [facility]



                [facility]                                         

 

                T4, FREE        2022 21:32:00 Esther [facility]gs [facility]

spital

 

                TROPONIN T      2022 21:32:00 Esther [facility]gs [facility]

spital

 

                COVID-19 QUALITATIVE 2022 21:22:00 Ray, [facility]



                RT-PCR                                          

 

                CT LUMBAR SPINE WO 2022 20:55:41 Ray, [facility]



                CONTRAST                                        

 

                CT THORACIC SPINE WO 2022 20:53:28 Ray, [facility]



                CONTRAST                                        

 

                CT ANGIOGRAM PE CHEST 2022 20:45:21 Ray, [facility]

 

                ECG 12-LEAD     2022 19:35:11 Ray, [facility]

 

                XR THORACIC SPINE 2 VW 2022 19:17:36 Ray, [facility]

 

                HC COMPLETE BLD COUNT 2022 18:50:00 Ray, [facility]



                W/AUTO DIFF                                     

 

                COMPREHENSIVE METABOLIC 2022 18:50:00 Ray, [facility]



                PANEL                                           

 

                TROPONIN T      2022 18:50:00 Ray, [facility]

 

                ESTIMATED GFR   2022 18:50:00 Ray, [facility]

 

                LIPASE LEVEL    2022 18:50:00 Ray, [facility]

 

                ECG ED PRELIMINARY 2022 18:18:35 Ray, [facility]



                INTERPRETATION                                  

 

                XR CHEST 2 VW   2022 16:02:54 [facility]

ospital

 

                Diabetic retinal eye 2020 06:00:00                 Dillan Mart



                exam<sup>1</sup>                                 

 

                Mammogram       2017-07-15 05:00:00                 Doreen Her hernandes

 

                Colonoscopy<sup>2</sup> 2017 06:00:00                 Hakeem Mart

 

                OPEN REDUCTION INTERNAL 2016-03-10 22:13:00 Shandra Leiew   [facility]



                FIXATION RADIUS                                 



                INTRA-ARTICULAR W/3                                 



                FRAGMENTS [id number]                                 



                (Right)<sup>1</sup>                                 

 

                Repair of       2016-03-10 06:00:00                 Doreen Her

hernandes



                wrist<sup>3</sup>                                 

 

                skin cancer removed from 2012 00:00:00                 [facility]



                arm                                             

 

                Cholecystectomy                                 [facility]

 

                Procedure<sup>2</sup>                                 [facility]

 

                Tubal ligation                                  [facility]

 

                Eye operation                                   [facility]

 

                Biopsy of breast                                 [facility]

[facility]

 

                bilateral radial                                 [facility]

n



                kerototomy                                      

 

                bilateral tubal ligation                                 [facility]

 

                colonoscopy                                     [facility]

 

                Skin cancer of                                  [facility]



                arm<sup>4</sup>                                 







Plan of Care







             Planned Activity Planned Date Details      Comments     Source

 

             Future Scheduled 2029   DTAP/TDAP/TD VACCINES              [facility]

[facility]



             Test         00:00:00     (2 - Td or Tdap) [code              Medic

al Center



                                       = DTAP/TDAP/TD              



                                       VACCINES (2 - Td or              



                                       Tdap)]                    

 

             Future Scheduled 2029   DTAP/TDAP/TD VACCINES              [facility]

[facility]



             Test         00:00:00     (2 - Td or Tdap) [code              Medic

al Center



                                       = DTAP/TDAP/TD              



                                       VACCINES (2 - Td or              



                                       Tdap)]                    

 

             Future Scheduled 2029   DTAP/TDAP/TD VACCINES              [facility]

[facility]



             Test         00:00:00     (2 - Td or Tdap) [code              Medic

al Center



                                       = DTAP/TDAP/TD              



                                       VACCINES (2 - Td or              



                                       Tdap)]                    

 

             Future Scheduled 2029   DTAP/TDAP/TD VACCINES              [facility]

[facility]



             Test         00:00:00     (2 - Td or Tdap) [code              Medic

al Center



                                       = DTAP/TDAP/TD              



                                       VACCINES (2 - Td or              



                                       Tdap)]                    

 

             Future Scheduled 2029   DTAP/TDAP/TD VACCINES              

[facility]



             Test         00:00:00     (2 - Td or Tdap) [code              Medic

al Center



                                       = DTAP/TDAP/TD              



                                       VACCINES (2 - Td or              



                                       Tdap)]                    

 

             Future Scheduled 2023-01-10   COVID-19 VACCINE (#1)              [facility]



             Test         12:01:44     [code = COVID-19              



                                       VACCINE (#1)]              

 

             Future Scheduled 2023-01-10   65+ PNEUMOCOCCAL              Methodi

[facility]



             Test         12:01:44     VACCINE (1 - PCV)              



                                       [code = 65+               



                                       PNEUMOCOCCAL VACCINE              



                                       (1 - PCV)]                

 

             Future Scheduled 2023-01-10   SHINGLES VACCINES (1              Met

[facility]



             Test         12:01:44     of 2) [code = SHINGLES              



                                       VACCINES (1 of 2)]              

 

             Future Scheduled 2023-01-10   COLONOSCOPY SCREENING              [facility]



             Test         12:01:44     [code = COLONOSCOPY              



                                       SCREENING]                

 

             Future Scheduled 2023-01-10   BREAST CANCER              [facility]



             Test         12:01:44     SCREENING [code =              



                                       BREAST CANCER              



                                       SCREENING]                

 

             Future Scheduled 2023-01-10   COVID-19 VACCINE (#1)              [facility]



             Test         12:01:44     [code = COVID-19              



                                       VACCINE (#1)]              

 

             Future Scheduled 2023-01-10   65+ PNEUMOCOCCAL              Methodi

[facility]



             Test         12:01:44     VACCINE (1 - PCV)              



                                       [code = 65+               



                                       PNEUMOCOCCAL VACCINE              



                                       (1 - PCV)]                

 

             Future Scheduled 2023-01-10   SHINGLES VACCINES (1              Met

[facility]



             Test         12:01:44     of 2) [code = SHINGLES              



                                       VACCINES (1 of 2)]              

 

             Future Scheduled 2023-01-10   COLONOSCOPY SCREENING              [facility]



             Test         12:01:44     [code = COLONOSCOPY              



                                       SCREENING]                

 

             Future Scheduled 2023-01-10   BREAST CANCER              [facility]



             Test         12:01:44     SCREENING [code =              



                                       BREAST CANCER              



                                       SCREENING]                

 

             Future Scheduled 2023   DEPRESSION SCREENING              [facility]



             Test         00:00:00     (12+) [code =              Medical Center



                                       DEPRESSION SCREENING              



                                       (12+)]                    

 

             Future Scheduled 2023   FALLS RISK SCREENING              [facility]



             Test         00:00:00     [code = FALLS RISK              Medical C

enter



                                       SCREENING]                

 

             Future Scheduled 2023   DEPRESSION SCREENING              [facility]



             Test         00:00:00     (12+) [code =              Medical Center



                                       DEPRESSION SCREENING              



                                       (12+)]                    

 

             Future Scheduled 2023   FALLS RISK SCREENING              [facility]



             Test         00:00:00     [code = FALLS RISK              Medical C

enter



                                       SCREENING]                

 

             Future Scheduled 2022   HEPATITIS B VACCINES              Met

[facility]



             Test         11:07:43     (1 of 3 - 3-dose              



                                       series) [code =              



                                       HEPATITIS B VACCINES              



                                       (1 of 3 - 3-dose              



                                       series)]                  

 

             Future Scheduled 2022   COVID-19 VACCINE (#1)              [facility]



             Test         11:07:43     [code = COVID-19              



                                       VACCINE (#1)]              

 

             Future Scheduled 2022   65+ PNEUMOCOCCAL              [facility]i

 Hospital



             Test         11:07:43     VACCINE (1 - PCV)              



                                       [code = 65+               



                                       PNEUMOCOCCAL VACCINE              



                                       (1 - PCV)]                

 

             Future Scheduled 2022   SHINGLES VACCINES (1              Met

[facility]



             Test         11:07:43     of 2) [code = SHINGLES              



                                       VACCINES (1 of 2)]              

 

             Future Scheduled 2022   Screening for              [facility]



             Test         11:07:43     malignant neoplasm of              



                                       cervix (procedure)              



                                       [code = [id number]]              

 

             Future Scheduled 2022   COLONOSCOPY SCREENING              [facility]



             Test         11:07:43     [code = COLONOSCOPY              



                                       SCREENING]                

 

             Future Scheduled 2022   BREAST CANCER              [facility]



             Test         11:07:43     SCREENING [code =              



                                       BREAST CANCER              



                                       SCREENING]                

 

             Future Scheduled 2022   MEDICARE ANNUAL              [facility]



             Test         00:00:00     WELLNESS (YEAR 2 or              Medical 

Center



                                       FIRST YEAR if no IPPE)              



                                       [code = MEDICARE              



                                       ANNUAL WELLNESS (YEAR              



                                       2 or FIRST YEAR if no              



                                       IPPE)]                    

 

             Future Scheduled 2022   MEDICARE ANNUAL              [facility]



             Test         00:00:00     WELLNESS (YEAR 2 or              Medical 

Center



                                       FIRST YEAR if no IPPE)              



                                       [code = MEDICARE              



                                       ANNUAL WELLNESS (YEAR              



                                       2 or FIRST YEAR if no              



                                       IPPE)]                    

 

             Future Scheduled 2022   MEDICARE ANNUAL              [facility]



             Test         00:00:00     WELLNESS (YEAR 2 or              Medical 

Center



                                       FIRST YEAR if no IPPE)              



                                       [code = MEDICARE              



                                       ANNUAL WELLNESS (YEAR              



                                       2 or FIRST YEAR if no              



                                       IPPE)]                    

 

             Future Scheduled 2022   MEDICARE ANNUAL              [facility]



             Test         00:00:00     WELLNESS (YEAR 2 or              Medical 

Center



                                       FIRST YEAR if no IPPE)              



                                       [code = MEDICARE              



                                       ANNUAL WELLNESS (YEAR              



                                       2 or FIRST YEAR if no              



                                       IPPE)]                    

 

             Future Scheduled 2022   MEDICARE ANNUAL              [facility]



             Test         00:00:00     WELLNESS (YEAR 2 or              Medical 

Center



                                       FIRST YEAR if no IPPE)              



                                       [code = MEDICARE              



                                       ANNUAL WELLNESS (YEAR              



                                       2 or FIRST YEAR if no              



                                       IPPE)]                    

 

             Future Scheduled 2022   DEPRESSION SCREENING              [facility]



             Test         00:00:00     (12+) [code =              Medical Center



                                       DEPRESSION SCREENING              



                                       (12+)]                    

 

             Future Scheduled 2022   FALLS RISK SCREENING              [facility]



             Test         00:00:00     [code = FALLS RISK              Medical C

enter



                                       SCREENING]                

 

             Future Scheduled 2022   DEPRESSION SCREENING              [facility]



             Test         00:00:00     (12+) [code =              Medical [facility]



                                       DEPRESSION SCREENING              



                                       (12+)]                    

 

             Future Scheduled 2022   FALLS RISK SCREENING              [facility]



             Test         00:00:00     [code = FALLS RISK              Medical C

enter



                                       SCREENING]                

 

             Future Scheduled 2022   DEPRESSION SCREENING              [facility]



             Test         00:00:00     (12+) [code =              Medical Center



                                       DEPRESSION SCREENING              



                                       (12+)]                    

 

             Future Scheduled 2022   FALLS RISK SCREENING              [facility]



             Test         00:00:00     [code = FALLS RISK              Medical C

enter



                                       SCREENING]                

 

             Future Scheduled 2021   PNEUMOCOCCAL 65+ YRS              [facility]



             Test         00:00:00     (1 - PCV) [code =              Medical Ce

nter



                                       PNEUMOCOCCAL 65+ YRS              



                                       (1 - PCV)]                

 

             Future Scheduled 2021   PNEUMOCOCCAL 65+ YRS              [facility]



             Test         00:00:00     (1 - PCV) [code =              Medical Ce

nter



                                       PNEUMOCOCCAL 65+ YRS              



                                       (1 - PCV)]                

 

             Future Scheduled 2021   PNEUMOCOCCAL 65+ YRS              [facility]



             Test         00:00:00     (1 - PCV) [code =              Medical Ce

nter



                                       PNEUMOCOCCAL 65+ YRS              



                                       (1 - PCV)]                

 

             Future Scheduled 2021   PNEUMOCOCCAL 65+ YRS              [facility]



             Test         00:00:00     (1 - PCV) [code =              Medical Ce

nter



                                       PNEUMOCOCCAL 65+ YRS              



                                       (1 - PCV)]                

 

             Future Scheduled 2021   PNEUMOCOCCAL 65+ YRS              [facility]



             Test         00:00:00     (1 - PCV) [code =              Medical Ce

nter



                                       PNEUMOCOCCAL 65+ YRS              



                                       (1 - PCV)]                

 

             Future Scheduled 2006   SHINGLES VACCINES (1              [facility]



             Test         00:00:00     of 2) [code = SHINGLES              Medic

al Center



                                       VACCINES (1 of 2)]              

 

             Future Scheduled 2006   SHINGLES VACCINES (1              [facility]



             Test         00:00:00     of 2) [code = SHINGLES              Medic

al Center



                                       VACCINES (1 of 2)]              

 

             Future Scheduled 2006   SHINGLES VACCINES (1              [facility]



             Test         00:00:00     of 2) [code = SHINGLES              Medic

al Center



                                       VACCINES (1 of 2)]              

 

             Future Scheduled 2006   SHINGLES VACCINES (1              [facility]



             Test         00:00:00     of 2) [code = SHINGLES              Medic

al Center



                                       VACCINES (1 of 2)]              

 

             Future Scheduled 2006   SHINGLES VACCINES (1              [facility]



             Test         00:00:00     of 2) [code = SHINGLES              Medic

al Center



                                       VACCINES (1 of 2)]              

 

             Future Scheduled 1968   Tobacco Cessation              [facility]



             Test         00:00:00     Counseling and              Medical Cente

r



                                       Screening (12+) [code              



                                       = Tobacco Cessation              



                                       Counseling and              



                                       Screening (12+)]              

 

             Future Scheduled 1968   Tobacco Cessation              [facility]



             Test         00:00:00     Counseling and              Medical Cente

r



                                       Screening (12+) [code              



                                       = Tobacco Cessation              



                                       Counseling and              



                                       Screening (12+)]              

 

             Future Scheduled 1968   Tobacco Cessation              [facility]



             Test         00:00:00     Counseling and              Medical Cente

r



                                       Screening (12+) [code              



                                       = Tobacco Cessation              



                                       Counseling and              



                                       Screening (12+)]              

 

             Future Scheduled 1968   Tobacco Cessation              [facility]



             Test         00:00:00     Counseling and              Medical Cente

r



                                       Screening (12+) [code              



                                       = Tobacco Cessation              



                                       Counseling and              



                                       Screening (12+)]              

 

             Future Scheduled 1968   Tobacco Cessation              [facility]



             Test         00:00:00     Counseling and              Medical Cente

r



                                       Screening (12+) [code              



                                       = Tobacco Cessation              



                                       Counseling and              



                                       Screening (12+)]              

 

             Future Scheduled 1957   COVID-19 VACCINE (#1)              [facility]



             Test         00:00:00     [code = COVID-19              [facility]

ter



                                       VACCINE (#1)]              

 

             Future Scheduled 1957   COVID-19 VACCINE (#1)              [facility]



             Test         00:00:00     [code = COVID-19              [facility]

ter



                                       VACCINE (#1)]              

 

             Future Scheduled 1957   COVID-19 VACCINE (#1)              [facility]



             Test         00:00:00     [code = COVID-19              [facility]

ter



                                       VACCINE (#1)]              

 

             Future Scheduled 1957   COVID-19 VACCINE (#1)              [facility]



             Test         00:00:00     [code = COVID-19              [facility]

ter



                                       VACCINE (#1)]              

 

             Future Scheduled 1957   COVID-19 VACCINE (#1)              [facility]



             Test         00:00:00     [code = COVID-19              [facility]

ter



                                       VACCINE (#1)]              

 

             Future Scheduled 1956   Screening for              [facility]

[facility]



             Test         00:00:00     malignant neoplasm of              Medica

l Center



                                       breast (procedure)              



                                       [code = 607167781]              

 

             Future Scheduled 1956   CT Colonography              [facility]



             Test         00:00:00     (combo) [code = CT              Medical C

enter



                                       Colonography (combo)]              

 

             Future Scheduled 1956   Screening for              [facility]

es



             Test         00:00:00     malignant neoplasm of              Medica

l Center



                                       colon (procedure)              



                                       [code = 477065060]              

 

             Future Scheduled 1956   Screening for              [facility]

es



             Test         00:00:00     malignant neoplasm of              Medica

l Center



                                       colon (procedure)              



                                       [code = 664842657]              

 

             Future Scheduled 1956   DXA SCAN [code = DXA              [facility]



             Test         00:00:00     SCAN]                     [facility]

 

             Future Scheduled 1956   Screening for              [facility]

es



             Test         00:00:00     malignant neoplasm of              Medica

l Center



                                       colon (procedure)              



                                       [code = 443491407]              

 

             Future Scheduled 1956   Screening for              [facility]

es



             Test         00:00:00     malignant neoplasm of              Medica

l Center



                                       colon (procedure)              



                                       [code = 099434039]              

 

             Future Scheduled 1956   Sigmoidoscopy [code =              [facility]



             Test         00:00:00     Sigmoidoscopy]              [facility]

 

             Future Scheduled 1956   Screening for              [facility]

es



             Test         00:00:00     malignant neoplasm of              Medica

l Center



                                       breast (procedure)              



                                       [code = 012107991]              

 

             Future Scheduled 1956   CT Colonography              [facility]



             Test         00:00:00     (combo) [code = CT              Medical C

enter



                                       Colonography (combo)]              

 

             Future Scheduled 1956   Screening for              [facility]

es



             Test         00:00:00     malignant neoplasm of              Medica

l Center



                                       colon (procedure)              



                                       [code = 200924971]              

 

             Future Scheduled 1956   Screening for              [facility]

es



             Test         00:00:00     malignant neoplasm of              Medica

l Center



                                       colon (procedure)              



                                       [code = 022950410]              

 

             Future Scheduled 1956   DXA SCAN [code = DXA              [facility]



             Test         00:00:00     SCAN]                     [facility]

 

             Future Scheduled 1956   Screening for              [facility]

es



             Test         00:00:00     malignant neoplasm of              Medica

l Center



                                       colon (procedure)              



                                       [code = 807484380]              

 

             Future Scheduled 1956   Screening for              [facility]

es



             Test         00:00:00     malignant neoplasm of              Medica

l Center



                                       colon (procedure)              



                                       [code = 534321961]              

 

             Future Scheduled 1956   Sigmoidoscopy [code =              [facility]



             Test         00:00:00     Sigmoidoscopy]              [facility]

 

             Future Scheduled 1956   Screening for              [facility]

es



             Test         00:00:00     malignant neoplasm of              Medica

l Center



                                       breast (procedure)              



                                       [code = 846782954]              

 

             Future Scheduled 1956   CT Colonography              [facility]



             Test         00:00:00     (combo) [code = CT              Medical C

enter



                                       Colonography (combo)]              

 

             Future Scheduled 1956   Screening for              [facility]

es



             Test         00:00:00     malignant neoplasm of              Medica

l Center



                                       colon (procedure)              



                                       [code = 329078426]              

 

             Future Scheduled 1956   Screening for              [facility]

es



             Test         00:00:00     malignant neoplasm of              Medica

l Center



                                       colon (procedure)              



                                       [code = 501503295]              

 

             Future Scheduled 1956   DXA SCAN [code = DXA              [facility]



             Test         00:00:00     SCAN]                     [facility]

 

             Future Scheduled 1956   Screening for              [facility]

es



             Test         00:00:00     malignant neoplasm of              Medica

l Center



                                       colon (procedure)              



                                       [code = 908957582]              

 

             Future Scheduled 1956   Screening for              [facility]

es



             Test         00:00:00     malignant neoplasm of              Medica

l Center



                                       colon (procedure)              



                                       [code = 928201330]              

 

             Future Scheduled 1956   Sigmoidoscopy [code =              [facility]



             Test         00[facility]00:00     Sigmoidoscopy]              [facility]

 

             Future Scheduled 1956   Screening for              [facility]

es



             Test         00:00:00     malignant neoplasm of              Medica

l Center



                                       breast (procedure)              



                                       [code = 720892611]              

 

             Future Scheduled 1956   CT Colonography              [facility]



             Test         00:00:00     (combo) [code = CT              Medical C

enter



                                       Colonography (combo)]              

 

             Future Scheduled 1956   Screening for              [facility]

es



             Test         00:00:00     malignant neoplasm of              Medica

l Center



                                       colon (procedure)              



                                       [code = 860491810]              

 

             Future Scheduled 1956   Screening for              [facility]

es



             Test         00:00:00     malignant neoplasm of              Medica

l Center



                                       colon (procedure)              



                                       [code = 292628691]              

 

             Future Scheduled 1956   DXA SCAN [code = DXA              [facility]



             Test         00:00:00     SCAN]                     [facility]

 

             Future Scheduled 1956   Screening for              [facility]

es



             Test         00:00:00     malignant neoplasm of              Medica

l Center



                                       colon (procedure)              



                                       [code = 337751417]              

 

             Future Scheduled 1956   Screening for              [facility]

es



             Test         00:00:00     malignant neoplasm of              Medica

l Center



                                       colon (procedure)              



                                       [code = 213762381]              

 

             Future Scheduled 1956   Sigmoidoscopy [code =              [facility]



             Test         00:00:00     Sigmoidoscopy]              [facility]

 

             Future Scheduled 1956   Screening for              [facility]

es



             Test         00:00:00     malignant neoplasm of              Medica

l Center



                                       breast (procedure)              



                                       [code = 075978907]              

 

             Future Scheduled 1956   CT Colonography              [facility]



             Test         00:00:00     (combo) [code = CT              Medical C

enter



                                       Colonography (combo)]              

 

             Future Scheduled 1956   Screening for              [facility]

es



             Test         00:00:00     malignant neoplasm of              Medica

l Center



                                       colon (procedure)              



                                       [code = 581642640]              

 

             Future Scheduled 1956   Screening for              [facility]

es



             Test         00:00:00     malignant neoplasm of              Medica

l Center



                                       colon (procedure)              



                                       [code = 796543323]              

 

             Future Scheduled 1956   DXA SCAN [code = DXA              [facility]



             Test         00:00:00     SCAN]                     [facility]

 

             Future Scheduled 1956   Screening for              [facility]

es



             Test         00:00:00     malignant neoplasm of              Medica

l Center



                                       colon (procedure)              



                                       [code = 163591295]              

 

             Future Scheduled 1956   Screening for              [facility]

es



             Test         00:00:00     malignant neoplasm of              Medica

l Center



                                       colon (procedure)              



                                       [code = 256438051]              

 

             Future Scheduled 1956   Sigmoidoscopy [code =              [facility]



             Test         00:00:00     Sigmoidoscopy]              [facility]







Encounters







        Start   End     Encounter Admission Attending Care    Care    Encounter 

Source



        Date/Time Date/Time Type    Type    Clinicians Facility Department ID   

   

 

        2023         Inpatient RUFINA Sylvester [facility]    [id number]

6 [place]



        09:00:00                         Kd                  [phone] [facility]

 

        2022         Inpatient RUFINA Cuellar, [facility]   ENDO    [id number]

8 [facility]



        09:00:00                         [place]                   17      [facility]

 

        2022 Hospital       Luma Beckham [facility]   [id number]

 [id number] [facility]



        14:20:00 12:50:00 Encounter         Sanket Marshall [facility]

 

        [facility]         Luma Beckham [facility]   [id number]

 [id number] [facility]



        14:20:00 12:50:00 Encounter         Sanket Marshall [facility]

 

        2022 Inpatient [facility]    Cardiology 20

[id number] [facility]



        14:20:00 12:50:00                 VINAYAK                             

 

        2022 Orders                  [facility]   [id number]

[address] [facility]



        00:00:00 00:00:00 [facility]

 

        2022 Orders                  [facility]   [id number]

[address] [facility]



        00:00:00 00:00:00 Only                                            [facility]

 

        2022 Outpatient                 [facility]     [id number]

[id number] [facility]



        00:00:00 23:59:00                                                 Jessie

 

        2022 Orders                  [facility]   [id number]

[address] [facility]



        00:00:00 00:00:00 Only                                            [facility]

 

        2022 Orders                  [facility]   [id number]

[address] [facility]



        00:00:00 00:00:00 [facility]

 

        2022 Travel                  1.2.840.1 1.2.716.405 [id number]

[id number] [facility]i



        00:00:00 00:00:00                         52732.1.1 350.1.13.43 [address]     

st



                                                3.430.2.7 0.2.7.3.698         Ho

spita



                                                .3.512946 084.8           l



                                                .8                      

 

        2022 Travel                  1.2.840.1 1.2.931.454 8393

[id number] Methodi



        00:00:00 00:00:00                         46986.1.1 350.1.13.43 734     

st



                                                3.430.2.7 0.2.7.3.698         Ho

spita



                                                .3.562837 084.8           l



                                                .8                      

 

        2022-11-15 2022-11-15 Telephone         Pulido, 1.2.840.1 [id number]

[id number] Methodi



        00:00:00 00:00:00                 Gregoria   91189.1.1         [address]



                                                3.430.2.7                 Hospit

a



                                                .3.179637                 l



                                                .8                      

 

        2022-11-15 2022-11-15 Telephone         [place], 1.2.840.1 [id number]

[id number] Methodi



        00:00:00 00:00:00                 Gregoria   00225.1.1         [address]



                                                3.430.2.7                 Hospit

a



                                                .3.236439                 l



                                                .8                      

 

        [facility]         Suresh Pineda 1.2.840.1 1

[id number] 

[id number]                              Methodi



        20:01:00 18:10:00 Encounter         Zhen Jenkins 04158.1.1    

     [address]     [facility] [place] 3.430.2.7                 

Hospita



                                        [facility] .[phone]        

         l



                                                .8                      

 

        [facility]         Suresh Pineda 1.2.840.1 1

[id number] 

[id number]                              Methodi



        20:01:00 18:10:00 Encounter         Zhen Jenkins 06293.1.1    

     [address]     [address]



                                        [facility] [place] 3.430.2.7                 

Hospita



                                        [facility] Allyson .[phone]        

         l



                                                .8                      

 

        2022 Travel                  1.2.840.1 1.2.401.669 7923

[id number] Methodi



        00:00:00 00:00:00                         97852.1.1 350.1.13.43 6[address]     

st



                                                3.430.2.7 0.2.7.3.698         Ho

spita



                                                .3.618310 084.8           l



                                                .8                      

 

        2022 Travel                  1.2.840.1 1.2.390.838 8297

[id number] Methodi



        00:00:00 00:00:00                         05389.1.1 350.1.13.43 679     

st



                                                3.430.2.7 0.2.7.3.698         Ho

spita



                                                .3.578694 084.8           l



                                                .8                      

 

        2022 Orders          [place],  1.2.840.1 [id number] 906090

6850 Methodi



        00:00:00 00:00:00 Only            Sourav 96693.1.1         198     st



                                        Baljinder 3.430.2.7                 Hosp

mimi



                                                .3.903779                 l



                                                .8                      

 

        2022 Orders          [place],  1.2.840.1 [id number] 169306

6850 Methodi



        00:00:00 00:00:00 Only            Sourav 10874.1.1         198     st



                                        Baljinder 3.430.2.7                 Hosp

mimi



                                                .3.125419                 l



                                                .8                      

 

        2022-10-21 2022-10-21 Office          [place],  1.2.840.1 [id number] 880868

5385 Methodi



        12:00:00 12:15:00 Visit           Sourav 19239.1.1         507     st



                                        Baljinder 3.430.2.7                 Hosp

mimi



                                                .3.856503                 l



                                                .8                      

 

        2022-10-21 2022-10-21 Office          [place],  1.2.840.1 [id number] 869635

5385 Methodi



        12:00:00 12:15:00 Visit           Sourav 28538.1.1         507     st



                                        Baljinder 3.430.2.7                 Hosp

mimi



                                                .3.794087                 l



                                                .8                      

 

        2022-10-21 2022-10-21 Travel                  1.2.840.1 1.2.127.208 3062

158460 Methodi



        00:00:00 00:00:00                         29501.1.1 350.1.13.43 055     

st



                                                3.430.2.7 0.2.7.3.698         Ho

spita



                                                .3.890050 084.8           l



                                                .8                      

 

        2022-10-21 2022-10-21 Travel                  1.2.840.1 1.2.071.111 1880

184011 Methodi



        00:00:00 00:00:00                         12969.1.1 350.1.13.43 055     

st



                                                3.430.2.7 0.2.7.3.698         Ho

spita



                                                .3.150714 084.8           l



                                                .8                      

 

        2022-10-12 2022-10-12 Travel                  1.2.840.1 1.2.804.296 8058

702342 Methodi



        00:00:00 00:00:00                         76725.1.1 350.1.13.43 459     

st



                                                3.430.2.7 0.2.7.3.698         Ho

spita



                                                .3.846084 084.8           l



                                                .8                      

 

        2022-10-12 2022-10-12 Travel                  1.2.840.1 1.2.413.689 4603

902968 Methodi



        00:00:00 00:00:00                         31726.1.1 350.1.13.43 459     

st



                                                3.430.2.7 0.2.7.3.698         Ho

spita



                                                .3.131625 084.8           l



                                                .8                      

 

        2022-10-05 2022-10-05 [facility]  1.2.840.1 [id number] 

17719 Methodi



        12:10:58 23:59:00 Encounter         Sourav 43007.1.1         626     s

t



                                        Baljinder 3.430.2.7                 Hosp

mimi



                                                .3.559484                 l



                                                .8                      

 

        2022-10-05 2022-10-05 [facility]  1.2.840.1 [id number] 

43022 Methodi



        12:10:58 23:59:00 Encounter         Sourav 78191.1.1         626     s

t



                                        Baljinder 3.430.2.7                 Hosp

mimi



                                                .3.445853                 l



                                                .8                      

 

        2022-10-05 2022-10-05 [facility],  1.2.840.1 [id number] 27432

55391 Methodi



        09:06:47 12:09:00 Encounter         Sourav 79976.1.1         624     s

t



                                        Baljinder 3.430.2.7                 Hosp

mimi



                                                .3.191987                 l



                                                .8                      

 

        2022-10-05 2022-10-05 [facility],  1.2.840.1 [id number] 

85183 Methodi



        09:06:47 12:09:00 Encounter         Sourav 51153.1.1         624     s

t



                                        Baljinder 3.430.2.7                 Hosp

mimi



                                                .3.094784                 l



                                                .8                      

 

        2022-10-05 2022-10-05 [facility],  1.2.840.1 [id number] 

51610 Methodi



        08:25:04 09:05:00 Encounter         Sourav 95138.1.1         622     s

t



                                        Baljinder 3.430.2.7                 Hosp

mimi



                                                .3.137249                 l



                                                .8                      

 

        2022-10-05 2022-10-05 [facility],  1.2.840.1 [id number] 

89933 Methodi



        08:25:04 09:05:00 Encounter         Sourav 35492.1.1         622     s

t



                                        Baljinder 3.430.2.7                 Hosp

mimi



                                                .3.195533                 l



                                                .8                      

 

        2022-10-05 2022-10-05 Travel                  1.2.840.1 1.2.483.572 7641

753095 Methodi



        00:00:00 00:00:00                         64825.1.1 350.1.13.43 702     

st



                                                3.430.2.7 0.2.7.3.698         Ho

spita



                                                .3.790800 084.8           l



                                                .8                      

 

        2022-10-05 2022-10-05 Travel                  1.2.840.1 1.2.833.451 1818

000651 Methodi



        00:00:00 00:00:00                         49099.1.1 350.1.13.43 702     

st



                                                3.430.2.7 0.2.7.3.698         Ho

spita



                                                .3.544654 084.8           l



                                                .8                      

 

        2022 Office          Ekergt, 1.2.840.1 [id number] 440579

9152 Methodi



        14:15:00 14:15:00 Visit           Daylin  46914.1.1         502     st



                                                3.430.2.7                 Hospit

a



                                                .3.828689                 l



                                                .8                      

 

        2022 Office          Vadim, 1.2.840.1 [id number] 036977

9152 Methodi



        14:15:00 14:15:00 Visit           Daylin  83836.1.1         502     st



                                                3.430.2.7                 Hospit

a



                                                .3.277721                 l



                                                .8                      

 

        2022 Travel                  1.2.840.1 1.2.380.670 6215

090756 Methodi



        00:00:00 00:00:00                         30624.1.1 350.1.13.43 876     

st



                                                3.430.2.7 0.2.7.3.698         Ho

spita



                                                .3.518570 084.8           l



                                                .8                      

 

        2022 Travel                  1.2.840.1 1.2.815.637 7510

913775 Methodi



        00:00:00 00:00:00                         95790.1.1 350.1.13.43 876     

st



                                                3.430.2.7 0.2.7.3.698         Ho

spita



                                                .3.173759 084.8           l



                                                .8                      

 

        2022 Office          Bridger,  1.2.840.1 [id number] 167681

3139 Methodi



        12:45:00 13:22:26 Visit           Sourav 85617.1.1         547     st



                                        Baljinder 3.430.2.7                 Hosp

mimi



                                                .3.590706                 l



                                                .8                      

 

        2022 Office          Bridger,  1.2.840.1 [id number] 392707

3139 Methodi



        12:45:00 13:22:26 Visit           Sourav 65218.1.1         547     st



                                        Baljinder 3.430.2.7                 Hosp

mimi



                                                .3.974643                 l



                                                .8                      

 

        2022 Travel                  1.2.840.1 1.2.124.636 7182

982246 Methodi



        00:00:00 00:00:00                         10592.1.1 350.1.13.43 541     

st



                                                3.430.2.7 0.2.7.3.698         Ho

spita



                                                .3.621641 084.8           l



                                                .8                      

 

        2022 Travel                  1.2.840.1 1.2.467.531 6469

346022 Methodi



        00:00:00 00:00:00                         83376.1.1 350.1.13.43 541     

st



                                                3.430.2.7 0.2.7.3.698         Ho

spita



                                                .3.092749 084.8           l



                                                .8                      

 

          2022 [facility]

hbhai 1.2.840.1 

[id number]                 [id number]                Methodi



        07:21:00 20:54:00 Encounter         Al Douglassil 43608.1.1         90

4     st



                                                3.430.2.7                 Hospit

a



                                                .3.595939                 l



                                                .8                      

 

          2022 [facility]

hbhai 1.2.840.1 

[id number]                 [id number]                Methodi



        07:21:00 20:54:00 Encounter         Al Douglassil 85897.1.1         90

4     st



                                                3.430.2.7                 Hospit

a



                                                .3.571042                 l



                                                .8                      

 

        2022 Prep for         Odom, 1.2.840.1 [id number] 33195

46359 Methodi



        00:00:00 00:00:00 Surgery         Louann D 90326.1.1         190     s

t



                                                3.430.2.7                 Hospit

a



                                                .3.806620                 l



                                                .8                      

 

        2022 Prep for         Odom, 1.2.840.1 [id number] 67800

58342 Methodi



        00:00:00 00:00:00 Surgery         Louann D 78051.1.1         190     s

t



                                                3.430.2.7                 Hospit

a



                                                .3.755542                 l



                                                .8                      

 

        2022 Travel                  1.2.840.1 1.2.249.212 3165

047934 Methodi



        00:00:00 00:00:00                         28950.1.1 350.1.13.43 602     

st



                                                3.430.2.7 0.2.7.3.698         Ho

spita



                                                .3.140590 084.8           l



                                                .8                      

 

        2022 Travel                  1.2.840.1 1.2.954.651 7161

269396 Methodi



        00:00:00 00:00:00                         51334.1.1 350.1.13.43 602     

st



                                                3.430.2.7 0.2.7.3.698         Ho

spita



                                                .3.882239 084.8           l



                                                .8                      

 

        2022 Outpatient KHANH MinayaLOTUS   [facility]    [id number]

562 [facility]



        04:58:00 04:58:00                 Venancio                  89      [facility]

 

        2022 Outpatient KHANH MinayaLOTUS   [facility]    [id number]

090 [facility]



        06:37:00 06:37:00                 Venancio                  64      [facility]

 

        2022 Lab                     [facility]   [id number]

[address] [facility]



        [facility]

 

        [facility]                     [facility]   [id number]

[address] [facility]



        00:00:00 00:00:00 Requitio                                         [facility]

 

        2022 [facility]         [facility]     476

806-202 [place]



        00:00:00 00:00:00                                         [address]49   [place]



                                                                        Urology

 

        2022 Emergency         Louann Jaime 1.2.840

.1 [id number] 

[id number]                              Methodi



        02:04:00 18:01:00                 Aki Dupree 06774.1.1         026     

st



                                        [facility]helby Calle 3.430.2.7          

       Hospita



                                        Fantasma Squires .3.203542                 

l



                                                .8                      

 

        2022 Emergency         Louann Jaime 1.2.840

.1 [id number] 

[id number]                              Methodi



        02:04:00 18:01:00                 Maria Dolores Aki 66961.1.1         026     

st



                                        [facility]hel[facility] Calle 3.430.2.7          

       Hospita



                                        Fantasma Squires .3.379194                 

l



                                                .8                      

 

        2022 Travel                  1.2.840.1 1.2.789.565 4614

806701 Methodi



        00:00:00 00:00:00                         01726.1.1 350.1.13.43 552     

st



                                                3.430.2.7 0.2.7.3.698         Ho

spita



                                                .3.995160 084.8           l



                                                .8                      

 

        2022 Travel                  1.2.840.1 1.2.618.529 8902

369375 Methodi



        00:00:00 00:00:00                         02833.1.1 350.1.13.43 552     

st



                                                3.430.2.7 0.2.7.3.698         Ho

spita



                                                .3.688902 084.8           l



                                                .8                      

 

        2022 Outpatient                 nullFlavo [facility]

[id number] Memoria



        21:20:00 04:59:59                         r       Medicine 56      l



                                                        Rudy Hines

n

 

        2022 Outpatient                 [facility]Flavo [facility]

[id number] Memoria



        21:20:00 04:59:59                         r       Medicine 56      l



                                                        Rudy Hines

n

 

        2022 Outpatient         Coyne,  [facility]    [facility]    [id number]

365 



        16:20:00 23:59:59                 Charisse N                 56      

 

        2022 Outpatient                 [facility]    [id number]

365 Memoria



        16:20:00 16:20:00                                         56      l



                                                                        Barron

 

        2022 Emergency         Maureen,  1.2.840.1 [id number] 

714933 Methodi



        17:10:00 22:30:00                 Martha   70411.1.1         503     st



                                        [place] 3.430.2.7                 Hosp

mimi



                                                .3.025415                 l



                                                .8                      

 

        2022 Emergency         Nweze,  1.2.840.1 [id number] 2100995 Methodi



        17:10:00 22:30:00                 Martha   82255.1.1         503     st



                                        [place] 3.430.2.7                 Hosp

mimi



                                                .3.335416                 l



                                                .8                      

 

        2022 Travel                  1.2.840.1 1.2.925.018 4542

170723 Methodi



        00:00:00 00:00:00                         03819.1.1 350.1.13.43 329     

st



                                                3.430.2.7 0.2.7.3.698         Ho

spita



                                                .3.613523 084.8           l



                                                .8                      

 

        2022 Travel                  1.2.840.1 1.2.306.314 2349

934502 Methodi



        00:00:00 00:00:00                         86763.1.1 350.1.13.43 329     

st



                                                3.430.2.7 0.2.7.3.698         Ho

spita



                                                .3.105844 084.8           l



                                                .8                      

 

        2022 [facility]         Parveen Hutchinson. 1.2.840.1 104

750045 

[id number]                              Methodi



        21:20:00 13:07:00 Encounter         Rosalio Angulo 09689.1.1         968  

   [facility] .3.617038          

       l



                                                .8                      

 

        [facility].840[facility]1 104

535152 

[id number]                              Methodi



        21:20:00 13:07:00 Encounter         Rosalio Angulo 56362.1.1         968  

   [facility] 3.430.2.7         

        [facility] .3.968664          

       l



                                                .8                      

 

        2022 Anesthesia         Hasmukh   1.2.840.1 [id number] 210

[id number] Methodi



        13:41:00 14:30:00 Event           Yaoyao  99769.1.1         538     st



                                        Laura  3.430.2.7                 Hospit

a



                                                .3.123454                 l



                                                .8                      

 

        2022 Anesthesia         Noe,   1.2.840.1 [id number] 210

[id number] Methodi



        13:41:00 14:30:00 Event           Yaoyao  25008.1.1         538     st



                                        Laura  3.430.2.7                 Hospit

a



                                                .3.248196                 l



                                                .8                      

 

        2022 Travel                  1.2.840.1 1.2.465.410 9502

360961 Methodi



        00:00:00 00:00:00                         61471.1.1 350.1.13.43 714     

st



                                                3.430.2.7 0.2.7.3.698         Ho

spita



                                                .3.555435 084.8           l



                                                .8                      

 

        2022 Travel                  1.2.840.1 1.2.721.063 1960

500418 Methodi



        00:00:00 00:00:00                         57626.1.1 350.1.13.43 714     

st



                                                3.430.2.7 0.2.7.3.698         Ho

spita



                                                .3.288964 084.8           l



                                                .8                      

 

        2022 Outpatient Suzie Ramos [facility]   DAYS    LA0

[id number] [facility]



        07:07:00 07:07:00                                         79      [facility]

 

        2022 Outpatient Suzie Ramos [facility]   [facility]   F94

[id number] [facility]



        07:07:00 07:07:00                                         27293   [facility]

 

        2022 Phone                   nullFlavo [facility] [id number]

[id number] Memoria



        15:25:04 04:59:59 Message                 r       Medicine Deric martinez

 

        2022 Phone                   nullFlavo [facility] [id number]

[id number] Memoria



        15:25:04 04:59:59 Message                 r       Medicine 17      dennis Hines

n

 

        2022 Outpatient                 [facility]    [facility]    [id number]

355 



        10:25:04 23:59:59                                         17      

 

        2022 Emergency EM      Hadley Medina [facility]   CT    LA00

[id number] [facility]



        12:08:00 15:51:00                                         00      [facility]

 

        2022 Emergency EM      Hadley Medina [facility]   [facility]   F945

 [facility]



        12:08:00 15:51:00                                            [facility]

 

        2022 Outpatient RUFINA Cuellar [facility]   ENDO    

62449 [facility]



        07:10:00 07:10:00                 Clark                   92      [facility]

 

        2022 Ambulatory                 [facility]Flavo [facility]

[id number] [facility]



        15:15:00 15:15:00 Pre-Reg                 r       Medicine 54      l



                                                        Rudy Hines

michelle

 

        2022 Ambulatory                 [facility]Flavo [facility]

[id number] [facility]



        15:15:00 15:15:00 Pre-Reg                 r       Medicine 54      l



                                                        Rudy         Donny

michelle

 

        2022 Outpatient                 [facility]    [id number]

365 [facility]



        10:15:00 10:15:00                                         54      l



                                                                        Barron

 

        2022 Outpatient         [place]ne,  [facility]    [id number]

365 



        10:15:00 10:15:00                 Charisse MARTINEZ                 54      

 

        2022 Outpatient                 [facility]    [id number]

365 [facility]



        10:30:00 10:30:00                                         55      dennis Mart

 

        2022 Outpatient                 [facility]    [id number]

365 [facility]



        10:30:00 10:30:00                                         55      dennis Mart

 

        2022 Patient         Joana Adan 1.2.840.1 [id number] 21

[id number] 

Methodi



        00:00:00 00:00:00 Outreach                 50044.1.1         020     st



                                                3.430.2.7                 Hospit

a



                                                .3.487878                 l



                                                .8                      

 

        2022 Patient         Joana Adan 1.2.840.1 [id number] 21

[id number] 

Methodi



        00:00:00 00:00:00 Outreach                 66563.1.1         020     st



                                                3.430.2.7                 Hospit

a



                                                .3.831448                 l



                                                .8                      

 

        2022 Office          Ekeruo, 1.2.840.1 [id number] 910953

0540 Methodi



        10:15:00 11:21:59 Visit           Daylin  63976.1.1         779     st



                                                3.430.2.7                 Hospit

a



                                                .3.849749                 l



                                                .8                      

 

        2022 Office          Ekeruo, 1.2.840.1 [id number] 2100

0540 Methodi



        10:15:00 11:21:59 Visit           Daylin  69441.1.1         779     st



                                                3.430.2.7                 Hospit

a



                                                .3.817322                 l



                                                .8                      

 

        2022 Travel                  1.2.840.1 1.2.299.472 6723

009278 Methodi



        00:00:00 00:00:00                         75533.1.1 350.1.13.43 779     

st



                                                3.430.2.7 0.2.7.3.698         Ho

spita



                                                .3.263412 084.8           l



                                                .8                      

 

        2022 Travel                  1.2.840.1 1.2.280.992 1008

350003 Methodi



        00:00:00 00:00:00                         41500.1.1 350.1.13.43 779     

st



                                                3.430.2.7 0.2.7.3.698         Ho

spita



                                                .3.454007 084.8           l



                                                .8                      

 

        [facility]         Suresh Pineda 1.2.840.1 1

[id number] 

[id number]                              Methodi



        12:16:00 16:05:00 Encounter         Melissa Pickett 65218.1.1   

      492     st



                                                3.430.2.7                 Hospit

a



                                                .3.809471                 l



                                                .8                      

 

        [facility]         Suresh Pineda 1.2.840.1 1

[id number] 

[id number]                              Methodi



        12:16:00 16:05:00 Encounter         LaurelstaceyMelissa chavis Ian ANKIT 73180.1.1   

      492     st



                                                3.430.2.7                 Hospit

a



                                                .3.939587                 l



                                                .8                      

 

        2022 Anesthesia         [facility] 1.2.8

40.1 [id number] 

[id number]                              Methodi



        13:07:00 14:43:00 Event           Elaina Clark 77353.1.1         65

0     st



                                                3.430.2.7                 Hospit

a



                                                .3.820552                 l



                                                .8                      

 

        2022 Anesthesia         [facility] Naseem 1.2.8

40.1 [id number] 

[id number]                              Methodi



        13:07:00 14:43:00 Event           Elaina Clark 91683.1.1         65

0     st



                                                3.430.2.7                 Hospit

a



                                                .3.832343                 l



                                                .8                      

 

        2022 Telephone         Ekeruo, 1.2.840.1 [id number] 

822036 Methodi



        00:00:00 00:00:00                 Daylin  32913.1.1         006     st



                                                3.430.2.7                 Hospit

a



                                                .3.013053                 l



                                                .8                      

 

        2022 Telephone         Ekeruo, 1.2.840.1 [id number] 

750254 Methodi



        00:00:00 00:00:00                 Daylin  59588.1.1         006     st



                                                3.430.2.7                 Hospit

a



                                                .3.499243                 l



                                                .8                      

 

        2022 Phone                   nullFlavo [facility] 3467

[id number] Memoria



        17:07:02 05:59:59 Message                 r       Medicine 16      l



                                                        Rudy martinez

 

        2022 Phone                   [facility]Flavo [facility] 3467

[id number] Memoria



        17:07:02 05:59:59 Message                 r       Medicine 16      l



                                                        Rudy martinez

 

        2022 Outpatient                 [facility]    [id number]

355 



        11:07:02 23:59:59                                         16      

 

        2022 Travel                  1.2.840.1 1.2.388.886 0937

120031 Methodi



        00:00:00 00:00:00                         00467.1.1 350.1.13.43 996     

st



                                                3.430.2.7 0.2.7.3.698         Ho

spita



                                                .3.882620 084.8           l



                                                .8                      

 

        2022 Travel                  1.2.840.1 1.2.483.035 5692

120031 Methodi



        00:00:00 00:00:00                         95539.1.1 350.1.13.43 996     

st



                                                3.430.2.7 0.2.7.3.698         Ho

spita



                                                .3.387639 084.8           l



                                                .8                      

 

        2022 Office          [facility],  1.2.840.1 [id number] [id number]

7744 Methodi



        16:30:00 16:34:07 Visit           Brenna 76933.1.1         169     st



                                                3.430.2.7                 Hospit

a



                                                .3.531986                 l



                                                .8                      

 

        2022 Office          [facility],  1.2.840.1 [id number] 875495

7744 Methodi



        16:30:00 16:34:07 Visit           Brenna 83797.1.1         169     st



                                                3.430.2.7                 Hospit

a



                                                .3.231877                 l



                                                .8                      

 

        2022 [facility],  1.2.840.1 [id number] 32998

86180 Methodi



        09:45:00 23:59:00 Encounter         Novauddin 28190.1.1         979     

st



                                                3.430.2.7                 Hospit

a



                                                .3.582940                 l



                                                .8                      

 

        2022 [facility],  1.2.840.1 [id number] 31849

57512 Methodi



        09:45:00 23:59:00 Encounter         Novasuedin 85401.1.1         979     

st



                                                3.430.2.7                 Hospit

a



                                                .3.259061                 l



                                                .8                      

 

        2022 Travel                  1.2.840.1 1.2.081.265 1394

922288 Methodi



        00:00:00 00:00:00                         00497.1.1 350.1.13.43 961     

st



                                                3.430.2.7 0.2.7.3.698         Ho

spita



                                                .3.942621 084.8           l



                                                .8                      

 

        2022 Travel                  1.2.840.1 1.2.492.534 0724

980230 Methodi



        00:00:00 00:00:00                         65256.1.1 350.1.13.43 961     

st



                                                3.430.2.7 0.2.7.3.698         Ho

spita



                                                .3.174023 084.8           l



                                                .8                      

 

        2022 Phone                   [facility]

[id number] Memoria



        19:33:53 05:59:59 Message                 r       Medicine 15      dennis Hines

michelle

 

        2022 Phone                   [facility]

[id number] Memoria



        19:33:53 05:59:59 Message                 r       Medicine 15      dennis Hines

michelle

 

        2022 Outpatient                 [facility]    [id number]

355 



        13:33:53 23:59:59                                         15      

 

        2021 Phone                   [facility]

[id number] Memoria



        19:38:03 05:59:59 Message                 r       Medicine 14      dennis Hines

michelle

 

        2021 Phone                   [facility]

[id number] Memoria



        19:38:03 05:59:59 Message                 r       Medicine 14      l



                                                        Rudy Hines

michelle

 

        2021 Outpatient                 [facility]    [id number]

355 



        13:38:03 23:59:59                                         14      

 

        2021 Outpatient                 [facility] 3

[id number] Memoria



        21:15:00 05:59:59                         r       Medicine 53      l



                                                        Grantshyanne Hines

michelle

 

        2021 Outpatient                 [facility]

[id number] Memoria



        21:15:00 05:59:59                         r       Medicine 53      l



                                                        Rudy Rosarioan

n

 

        2021 Outpatient         Peter  [facility]    [id number]

365 



        15:15:00 23:59:59                 Charisse N                 53      

 

        [facility]    [id number]

365 Memoria



        15:15:00 15:15:00                                         53      dennis Mart

 

        2021 Office          [facility],  1.2.840.1 [id number] [id number]

2742 Methodi



        15:30:00 16:01:04 Visit           Brenna 56621.1.1         834     st



                                                3.430.2.7                 Hospit

a



                                                .3.825015                 l



                                                .8                      

 

        2021 Travel                  1.2.840.1 1.2.258.290 2514

930485 Methodi



        00:00:00 00:00:00                         70049.1.1 350.1.13.43 575     

st



                                                3.430.2.7 0.2.7.3.698         Ho

spita



                                                .3.050584 084.8           l



                                                .8                      

 

        2021 Between                 [facility] 3467

[id number] Memoria



        14:18:24 14:18:24 Visit                   r       Medicine 62      l



                                                        Rudy martinez

 

        2021 Between                 [facility] 3467

[id number] Memoria



        14:18:24 14:18:24 Visit                   r       Medicine 62      l



                                                        Rudy Hines

n

 

        2021 Outpatient                 [facility]    [id number]

375 



        08:18:24 08:18:24                                         62      

 

        2021 Between                 [facility] 3467

[id number] Memoria



        00:56:47 00:56:47 Visit                   r       Medicine 61      dennis martinez

 

        2021 Between                 [facility] 3467

[id number] Memoria



        00:56:47 00:56:47 Visit                   r       Medicine 61      l



                                                        Rudy martinez

 

        2021 Outpatient                 [facility]    [id number]

375 



        18:56:47 18:56:47                                         61      

 

        2021 Outpatient                 [facility] 3

[id number] Memoria



        20:00:00 05:59:59                         r       Medicine 52      l



                                                        Rudy Hines

n

 

        2021 Outpatient                 [facility] 3

[id number] Memoria



        20:00:00 05:59:59                         r       Medicine 52      l



                                                        Rudy Hines

n

 

        2021 Outpatient         [place],  [facility]    [id number]

365 



        14:00:00 23:59:59                 Charisse N                 52      

 

        2021 Outpatient                 [facility]    [id number]

365 Memoria



        14:00:00 14:00:00                                         52      l



                                                                        Barron

 

        2021 Phone                   [facility]

[id number] Memoria



        21:38:38 05:59:59 Message                 r       Medicine 13      dennis Hines

n

 

        2021 Phone                   [facility]

[id number] Memoria



        21:38:38 05:59:59 Message                 r       Medicine 13      dennis Hines

n

 

        2021 Outpatient                 [facility]    [facility]    [id number]

355 



        15:38:38 23:59:59                                         13      

 

        2021 Office          Ekeruo, 1.2.840.1 [id number]

2744 Methodi



        11:00:00 11:44:58 Visit           Daylin  20450.1.1         513     st



                                                3.430.2.7                 Hospit

a



                                                .3.817365                 l



                                                .8                      

 

        2021 Travel                  1.2.840.1 1.2.906.601 8262

723631 Methodi



        00:00:00 00:00:00                         89073.1.1 350.1.13.43 808     

st



                                                3.430.2.7 0.2.7.3.698         Ho

spita



                                                .3.191035 084.8           l



                                                .8                      

 

        2021-11-15 2021 Between                 [facility] 3467

[id number] Memoria



        15:33:59 15:33:59 Visit                   r       Medicine 59      l



                                                        Rudy Hines

n

 

        2021-11-15 2021 Between                 [facility] 3467

[id number] Memoria



        15:33:59 15:33:59 Visit                   r       Medicine 59      l



                                                        Rudy Hines

n

 

        2021-11-15 2021 Outpatient                 [facility]    [id number]

375 



        09:33:59 09:33:59                                         59      

 

        2021 2021-10-05 Inpatient         SOLIPURAM, [facility]     074     [id number]

[id number] [place]



        00:00:00 00:00:00                 MELISSA                    329     Method

i



                                                                        

 

        2021 Outpatient         [facility] PRIV    PRIV    140

[id number] [facility]



        00:00:00 00:00:00                 n_J                     [id number] Medica

l

 

        2021 Outpatient         OPPERMANN, [facility]     2100

[id number] [place]



        00:00:00 00:00:00                 JULIANNA                 104     Method

i



                                                                        

 

        2021 Outpatient         AHMED,  [facility]     [id number]

765 [place]



        00:00:00 00:00:00                 RAZIUDDIN                 273     Meth

farhana



                                                                        

 

        2021 Outpatient         EKERUO, [facility]     [id number]

411 [place]



        00:00:00 00:00:00                 DAYLIN                  787     Method

i



                                                                        

 

        2021 Outpatient         DAOURA, [facility]     [id number]

587 [place]



        00:00:00 00:00:00                 MAGY                 546     Method

i



                                                                        

 

        2021 Inpatient         MATHIVANAN, [facility]     064     2100

[id number] [place]



        00:00:00 00:00:00                 MELVIN                   275     Method

i



                                                                        

 

        2021 Outpatient         AHMED,  [facility]     [id number]

068 [place]



        00:00:00 00:00:00                 RAZIUDDIN                 199     Meth

farhana



                                                                        

 

        2021 Outpatient         EKERUO, [facility]     021     [id number]

337 [place]



        00:00:00 00:00:00                 DAYLIN                  640     Method

i



                                                                        

 

        202116 Outpatient         EKERUO, [facility]     [id number]

412 [place]



        00:00:00 00:00:00                 DAYLIN                  435     Method

i



                                                                        

 

        2021 Outpatient         EKERUO, [facility]     [id number]

412 [place]



        00:00:00 00:00:00                 DAYLIN                  537     Method

i



                                                                        

 

        2021 Outpatient         EKERUO, [facility]     [id number]

029 [place]



        00:00:00 00:00:00                 DAYLIN                  709     Method

i



                                                                        

 

        2021 Inpatient         SOLIPURAM, [facility]     064     [id number]

[id number] [place]



        00:00:00 00:00:00                 MELISSA                    685     Method

i



                                                                        

 

        2021 Outpatient         AHMED,  [facility]     [id number]

046 [place]



        00:00:00 00:00:00                 RAZIUDDIN                 101     Meth

farhana



                                                                        

 

        2021 Outpatient                 [facility]

[id number] Memoria



        01:00:00 04:59:00                         r       [place] 58      l



                                                        [place]         [place]



 

        2021 Outpatient                 [facility]

[id number] Memoria



        01:00:00 04:59:00                         r       Bar[place] 58      l



                                                        [place]         [place]



 

        2021 Outpatient         Ahmed,  [facility]    SL    [id number]

375 



        20:00:00 23:59:00                 Raziuddin                 58      

 

        2021 Outpatient         AHMED,  [facility]    PUL     [id number]   

 [facility]



        20:00:00 23:59:00                 RAZIUDDIN                         

 

        2021 Outpatient         AHMED,  [facility]     [id number]

900 [place]



        00:00:00 00:00:00                 RAZIUDDIN                 598     Meth

farhana



                                                                        

 

        2021-04-15 2021 Inpatient         KISHOREAN, [facility]     064     2100

[id number] [place]



        00:00:00 00:00:00                 MELVIN                   060     Method

i



                                                                        

 

        2021 Outpatient                 [facility]     [id number]

422 [place]



        00:00:00 00:00:00                                         470     Method

i



                                                                        

 

        2021 Outpatient                 [facility]

[id number] Memoria



        19:30:00 04:59:59                         r       Medicine 51      l



                                                        Grant         Donny

n

 

        2021 Outpatient                 [facility]

[id number] Memoria



        19:30:00 04:59:59                         r       Medicine 51      l



                                                        Rudy Hines

n

 

        2021 Outpatient         C[place]ne,  [facility]    MG    [id number]

365 



        14:30:00 23:59:59                 Charisse N                 51      

 

        2021 Outpatient                 [facility]    [facility]    [id number]

365 Memoria



        14:30:00 14:30:00                                         51      l



                                                                        Barron

 

        2021 Outpatient                 [facility]     [id number]

901 [place]



        00:00:00 00:00:00                                         398     Method

i



                                                                        st

 

        2021 Between                 [facility]

[id number] Memoria



        13:38:03 13:38:03 Visit                   r       Medicine 57      l



                                                        [place]         Donny martinez

 

        2021 Between                 [facility]

[id number] Memoria



        13:38:03 13:38:03 Visit                   r       Medicine 57      l



                                                        Rudy martinez

 

        2021 Outpatient                 MG    MG    [id number]

375 



        08:38:03 08:38:03                                         57      

 

        2021 Outpatient         MED,  [facility]     [id number]

980 [place]



        00:00:00 00:00:00                 RAZIUDDIN                 554     Meth

farhana



                                                                        st

 

        2021 Inpatient         ANAIS [facility]     025     2100

[id number] [place]



        00:00:00 00:00:00                 MELVIN                   541     Method

i



                                                                        

 

        2021 Inpatient         ANAIS [facility]     Ayse     2100

[id number] [place]



        00:00:00 00:00:00                 MELVIN                   559     Method

i



                                                                        st

 

        2020 Inpatient         ANAIS [facility]     012     2100

[id number] [place]



        00:00:00 00:00:00                 MELVIN                   271     Method

i



                                                                        

 

        2020 Outpatient                 [facility]

[id number] Memoria



        16:30:00 05:59:59                         r       Medicine 50      l



                                                        Rudy Hines

n

 

        2020 Outpatient                 [facility]

[id number] Memoria



        16:30:00 05:59:59                         r       Medicine 50      l



                                                        Rudy Hines

n

 

        2020 Outpatient         [place],  MG    MG    [id number]

365 



        10:30:00 23:59:59                 Charisse N                 50      

 

        2020 Outpatient                 [facility]    [facility]    [id number]

365 [facility]



        10:30:00 10:30:00                                         50      l



                                                                        Barron

 

        2020 Inpatient         SOLIPURAM, [facility]     012     [id number]

[id number] [place]



        00:00:00 00:00:00                 MELISSA                    464     Method

i



                                                                        st

 

        2020-10-23 2020 Inpatient         SOLIPURAM, [facility]     012     [id number]

[id number] [place]



        00:00:00 00:00:00                 MELISSA                    557     Method

i



                                                                        st

 

        2020-10-23 2020-10-24 Outpt Diag                 nullFlavo [facility]    [id number]

[id number] [facility]



        18:10:00 04:59:00 Services                 r       Outpatient 06      l



                                                        Imaging         [place]



                                                        Cardinal         

 

        2020-10-23 2020-10-24 Outpt Diag                 nullFlavo [facility]    [id number]

[id number] [facility]



        18:10:00 04:59:00 Services                 r       Outpatient 06      l



                                                        Imaging         [place]



                                                        Cardinal         

 

        2020-10-23 2020-10-23 Outpatient         [place], MH29    29    [id number]

[id number] 



        13:10:00 23:59:00                 Abdi MARTINEZ                 06      

 

        2020-10-21 2020-10-22 Between                 nullFlavo [facility] Family 3467

[id number] [facility]



        17:58:19 17:58:19 Visit                   r       Medicine 56      l



                                                        Rudy Hines

n

 

        2020-10-21 2020-10-22 Between                 nullFlavo [facility] Family 3467

[id number] [facility]



        17:58:19 17:58:19 Visit                   r       Medicine 56      l



                                                        Rudy Hines

n

 

        2020-10-21 2020-10-22 Outpatient                 MG    [facility]    [id number]

375 



        12:58:19 12:58:19                                         56      

 

        2020-10-13 2020-10-14 Outpatient                 nullFlavo [facility] Family 3

[id number] [facility]



        15:15:00 04:59:59                         r       Medicine 49      l



                                                        Rudy Hines

n

 

        2020-10-13 2020-10-14 Outpatient                 nullFlavo [facility] 3

[id number] [facility]



        15:15:00 04:59:59                         r       Medicine 49      l



                                                        Rudy Hines

n

 

        2020-10-13 2020-10-13 Outpatient         Peter,  MG    MG    [id number]

365 



        10:15:00 23:59:59                 Charisse N                 49      

 

        2020-10-13 2020-10-13 Outpatient                 [facility]    [facility]    [id number]

365 [facility]



        10:15:00 10:15:00                                         49      l



                                                                        [place]

 

        2020 Outpt Diag                 nullFlavo [facility]    [id number]

[id number] [facility]



        17:02:00 04:59:00 Services                 r       Outpatient 05      l



                                                        Imaging         [facility] [place]



                                                        Cardinal         

 

        2020 Outpt Diag                 nullFlavo [facility]    [id number]

[id number] [facility]



        17:02:00 04:59:00 Services                 r       Outpatient 05      l



                                                        Imaging         [place]



                                                        Cardinal         

 

        2020 Outpatient         [place], 29    [facility]    [id number]

[id number] 



        12:02:00 23:59:00                 Abdi N                 05      

 

        2020 Ambulatory                 nullFlavo [facility]    [id number]

[id number] [facility]



        19:00:00 19:00:00 Pre-Reg                 r       Nephrology 46      l



                                                        Rudy Hines

n

 

        2020 Ambulatory                 nullFlavo [facility]    [id number]

[id number] [facility]



        19:00:00 19:00:00 Pre-Reg                 r       Nephrology 46      l



                                                        Rudy Hines

n

 

        2020 Outpatient                 [facility]    IE    [id number]

365 [facility]



        14:00:00 14:00:00                                         46      l



                                                                        [place]

 

        2020 Outpatient         Beth- MG    [facility]    346

[id number] 



        14:00:00 14:00:00                 Jese Gallegos                         

 

        2020 Outpatient                 [facility]    [facility]    [id number]

365 [facility]



        11:15:00 11:15:00                                         47      l



                                                                        [place]

 

        2020 Outpatient                 [facility]    [facility]    [id number]

365 [facility]



        11:15:00 11:15:00                                         47      l



                                                                        [facility] [place]

 

        2020 Outpatient                 nullFlavo MG    [id number]

[id number] [facility]



        19:45:00 04:59:59                         r       Nephrology 48      dennis Hines

michelle

 

        2020 Outpatient                 nullFlavo MG    [id number]

[id number] [facility]



        19:45:00 04:59:59                         r       Nephrology 48      dennis Hines

michelle

 

        2020 [facility]         [facility]    346

[id number] 



        14:45:00 23:59:59                 Daca,                   48      



                                        Gabriela CLARK                         

 

        2020 Outpatient                 [facility]    [facility]    [id number]

365 [facility]



        14:45:00 14:45:00                                         48      dennis



                                                                        [place]

 

        2020 Outpatient         CHAD [facility]     [id number]

[address] [place] [address]



        00:00:00 00:00:00                 EMILIA Quinonez

i



                                                                        

 

        2020 Phone                   nullFlavo MG    [id number]

55 [facility]



        16:17:50 04:59:59 Message                 r       Nephrology 12      dennis Hines

michelle

 

        2020 Phone                   nullFlavo MG    [id number]

55 [facility]



        16:17:50 04:59:59 Message                 r       Nephrology 12      dennis Hines

michelle

 

        2020 Outpatient                 MG    MG    [id number]

355 



        11:17:50 23:59:59                                         12      

 

        2020 Outpatient                 nullFlavo MG    [id number]

[id number] [facility]



        19:00:00 04:59:59                         r       Nephrology 41      dennis Hines

michelle

 

        2020 Outpatient                 nullFlavo MG    [id number]

[id number] [facility]



        19:00:00 04:59:59                         r       Nephrology 41      l



                                                        Rudy Hines

michelle

 

        2020 Outpatient         [facility]    346

[id number] 



        14:00:00 23:59:59                 Daca,                   41      



                                        Gabriela CLARK                         

 

        2020 Outpatient                 [facility]    [facility]    [id number]

365 [facility]



        14:00:00 14:00:00                                         41      dennis Mart

 

        2020 Phone                   nullFlavo MG    [id number]

55 [facility]



        18:35:51 04:59:59 Message                 r       Nephrology 11      dennis Mart

 

        2020 Phone                   nullFlavo MG    [id number]

55 [facility]



        18:35:51 04:59:59 Message                 r       Nephrology 11      dennis Mart

 

        2020 Outpatient                 [facility]    MG    [id number]

355 



        13:35:51 23:59:59                                         11      

 

        2020 Outpatient                 nullFlavo [facility] Family 3

[id number] [facility]



        15:15:00 04:59:59                         r       Medicine 45      dennis Hines

n

 

        2020 Outpatient                 nullFlavo [facility] 3

[id number] [facility]



        15:15:00 04:59:59                         r       Medicine 45      dennis Hines

n

 

        2020 Outpatient         [place],  [facility]    MG    [id number]

365 



        10:15:00 23:59:59                 Charisse N                 45      

 

        2020 Ambulatory                 nullFlavo [facility] Family 3

[id number] [facility]



        15:15:00 15:15:00 Pre-Reg                 r       Medicine 44      dennis Hines

n

 

        2020 Ambulatory                 nullFlavo [facility] 3

[id number] [facility]



        15:15:00 15:15:00 Pre-Reg                 r       Medicine 44      dennis Hines

n

 

        2020 Outpatient                 [facility]    [facility]    [id number]

365 [facility]



        10:15:00 10:15:00                                         45      dennis Mart

 

        2020 Outpatient                 [facility]    [facility]    [id number]

365 [facility]



        10:15:00 10:15:00                                         44      dennis Mart

 

        2020 Outpatient         [place],  [facility]    MG    [id number]

365 



        10:15:00 10:15:00                 Charisse N                 44      

 

        2020 Phone                   nullFlavo MG    [id number]

55 [facility]



        13:23:32 04:59:59 Message                 r       Nephrology 10      dennis Hines

n

 

        2020 Phone                   nullFlavo MG    [id number]

55 [facility]



        13:23:32 04:59:59 Message                 r       Nephrology 10      dennis Hines

michelle

 

        2020 Outpatient                 [facility]    MG    [id number]

355 



        08:23:32 23:59:59                                         10      

 

        2020 Between                 nullFlavo [facility] 3467

[id number] [facility]



        14:14:54 14:14:54 Visit                   r       Medicine 52      dennis Hines

michelle

 

        2020 Between                 nullFlavo [facility] 3467

[id number] [facility]



        14:14:54 14:14:54 Visit                   r       Medicine 52      dennis Hines

michelle

 

        2020 Outpatient                 [facility]    MG    [id number]

375 



        09:14:54 09:14:54                                         52      

 

        2020 Outpatient                 [facility]    [facility]    [id number]

365 [facility]



        11:30:00 11:30:00                                         43      dennis



                                                                        [place]

 

        2020 Outpatient                 [facility]    [facility]    [id number]

365 [facility]



        11:30:00 11:30:00                                         43      dennis



                                                                        [place]

 

        2020 2020-04-15 Phone                   nullFlavo [facility] 3467

[id number] [facility]



        20:00:15 04:59:59 Message                 r       Medicine 09      dennis Hines

michelle

 

        2020 2020-04-15 Phone                   nullFlavo [facility] 3467

[id number] [facility]



        20:00:15 04:59:59 Message                 r       Medicine 09      dennis Hines

michelle

 

        2020 Outpatient                 MG    [facility]    [id number]

355 



        15:00:15 23:59:59                                         09      

 

        2020-04-10 2020 Phone                   nullFlavo [facility] 3467

[id number] [facility]



        17:18:28 04:59:59 Message                 r       Medicine 08      dennis Hines

michelle

 

        2020-04-10 2020 Phone                   nullFlavo [facility] 3467

[id number] [facility]



        17:18:28 04:59:59 Message                 r       Medicine 08      dennis Hines

michelle

 

        2020-04-10 2020 Phone                   nullFlavo [facility]    [id number]

55 [facility]



        13:26:55 04:59:59 Message                 r       Nephrology 07      dennis Rosariosylvie martinez

 

        2020-04-10 2020 Phone                   nullFlavo [facility]    [id number]

55 [facility]



        13:26:55 04:59:59 Message                 r       Nephrology 07      l



                                                        Rudy Hines

n

 

        2020-04-10 2020 Outpatient                 [facility]    MG    [id number]

355 



        12:18:28 23:59:59                                         08      

 

        2020-04-10 2020 Outpatient                 [facility]    MG    [id number]

355 



        08:26:55 23:59:59                                         07      

 

        2020 Outpatient                 nullFlavo [facility] Family 3

[id number] [facility]



        20:15:00 05:59:59                         r       Medicine 42      l



                                                        Rudy Hines

n

 

        2020 Outpatient                 nullFlavo [facility] Family 3

[id number] [facility]



        20:15:00 05:59:59                         r       Medicine 42      l



                                                        Rudy Hines

n

 

        2020 Outpatient         [place],  MG    MG    [id number]

365 



        14:15:00 23:59:59                 Charisse MARTINEZ                 42      

 

        2020 Outpatient                 [facility]    [facility]    [id number]

365 [facility]



        14:15:00 14:15:00                                         42      dennis



                                                                        [place]

 

        2020 Phone                   nullFlavo [facility] 3467

[id number] [facility]



        14:22:27 05:59:59 Message                 r       Medicine 06      dennis Hines

michelle

 

        2020 Phone                   nullFlavo [facility] 3467

[id number] [facility]



        14:22:27 05:59:59 Message                 r       Medicine 06      dennis martinez

 

        2020 Outpatient                 [facility]    MG    [id number]

355 



        08:22:27 23:59:59                                         06      

 

        2019 Phone                   nullFlavo [facility] Family 3467

[id number] [facility]



        16:06:04 05:59:59 Message                 r       Medicine 05      dennis Hines

michelle

 

        2019 Phone                   nullFlavo [facility] 3467

[id number] [facility]



        16:06:04 05:59:59 Message                 r       Medicine 05      dennis Hines

n

 

        2019 Outpatient                 [facility]    MG    [id number]

355 



        10:06:04 23:59:59                                         2019 Between                 nullFlavo MG    [id number]

75 [facility]



        20:05:03 20:05:03 Visit                   r       Nephrology 45      denins Mart

 

        2019 Between                 nullFlavo [facility]    [id number]

75 [facility]



        20:05:03 20:05:03 Visit                   r       Nephrology 45      dennis Mart

 

        2019 Outpatient                 [facility]    [id number]

375 



        14:05:03 14:05:03                                         45      

 

        2019 Outpatient                 nullFlavo [facility]    [id number]

[id number] [facility]



        20:45:00 05:59:59                         r       Nephrology 38      dennis Hines

n

 

        2019 Outpatient                 nullFlavo [facility]    [id number]

[id number] [facility]



        20:45:00 05:59:59                         r       Nephrology 38      dennis Hines

n

 

        2019 Outpatient         [facility]    346

[id number] 



        14:45:00 23:59:59                 Rosy,                   Brianda CLARK                         

 

        [id number]

365 [facility]



        14:45:00 14:45:00                                         38      dennis Mart

 

        2019 Between                 nullFlavo [facility]    [id number]

75 [facility]



        00:07:10 00:07:10 Visit                   r       Nephrology 43      dennis Mart

 

        2019 Between                 nullFlavo [facility]    [id number]

75 [facility]



        00:07:10 00:07:10 Visit                   r       Nephrology 43      dennis Mart

 

        2019 Outpatient                 [facility]    [id number]

375 



        18:07:10 18:07:10                                         43      

 

        2019 Outpatient                 IE    IE    [id number]

365 [facility]



        09:00:00 09:00:00                                         39      dennis Mart

 

        2019 Outpatient                 IE    IE    [id number]

365 [facility]



        09:00:00 09:00:00                                         39      dennis



                                                                        [place]

 

        2019 Between                 nullFlavo [facility] 3467

[id number] [facility]



        21:47:12 21:47:12 Visit                   r       Medicine 41      dennis Hines

michelle

 

        2019 Between                 nullFlavo [facility] 3467

[id number] [facility]



        21:47:12 21:47:12 Visit                   r       Medicine 41      dennis Hines

n

 

        2019 Outpatient                 [facility]    [facility]    [id number]

375 



        15:47:12 15:47:12                                         41      

 

        2019-10-29 2019-10-30 Between                 nullFlavo [facility] 3467

[id number] [facility]



        22:13:13 22:13:13 Visit                   r       Medicine 40      dennis Hines

n

 

        2019-10-29 2019-10-30 Between                 nullFlavo [facility] Family 3467

[id number] [facility]



        22:13:13 22:13:13 Visit                   r       Medicine 40      dennis Hines

n

 

        2019-10-29 2019-10-30 Outpatient                 [facility]    [facility]    [id number]

375 



        17:13:13 17:13:13                                         40      

 

        2019-10-25 2019-10-26 Outpatient                 nullFlavo [facility] 3

[id number] [facility]



        14:45:00 04:59:59                         r       Medicine 40      dennis Hines

n

 

        2019-10-25 2019-10-26 Outpatient                 nullFlavo [facility] 3

[id number] [facility]



        14:45:00 04:59:59                         r       Medicine 40      dennis Hines

n

 

        2019-10-25 2019-10-25 Outpatient         [place],  MG    MG    [id number]

365 



        09:45:00 23:59:59                 Charisse N                 40      

 

        2019-10-25 2019-10-25 Outpatient                 [facility]    [facility]    [id number]

365 [facility]



        09:45:00 09:45:00                                         40      dennis Mart

 

        2019-10-17 2019-10-17 Ambulatory                 nullFlavo [facility] 3

[id number] [facility]



        16:00:00 16:00:00 Pre-Reg                 r       Medicine 36      dennis Hines

n

 

        2019-10-17 2019-10-17 Ambulatory                 nullFlavo [facility] 3

[id number] [facility]



        16:00:00 16:00:00 Pre-Reg                 r       Medicine 36      dennis Hines

n

 

        2019-10-17 2019-10-17 Outpatient                 [facility]    [facility]    [id number]

365 [facility]



        11:00:00 11:00:00                                         36      dennis Mart

 

        2019-10-17 2019-10-17 Outpatient         [place],  [facility]    MG    [id number]

365 



        11:00:00 11:00:00                 Charisse N                 36      

 

        2019-10-10 2019-10-11 Outpatient                 nullFlavo [facility]    [id number]

[id number] [facility]



        15:00:00 04:59:59                         r       Nephrology 35      dennis Hines

n

 

        2019-10-10 2019-10-11 Outpatient                 nullFlavo [facility]    [id number]

[id number] [facility]



        15:00:00 04:59:59                         r       Nephrology 35      dennis Hines

n

 

        2019-10-10 2019-10-10 Outpatient         [place],  MG    MG    [id number]

365 



        10:00:00 23:59:59                 Charisse N                 35      

 

        2019-10-10 2019-10-10 Outpatient                 IE    IE    [id number]

365 [facility]



        12:00:00 12:00:00                                         37      dennis Mart

 

        2019-10-10 2019-10-10 Outpatient                 [facility]    [facility]    [id number]

365 [facility]



        12:00:00 12:00:00                                         37      dennis Mart

 

        2019-10-10 2019-10-10 Outpatient                 [facility]    [facility]    [id number]

365 [facility]



        10:00:00 10:00:00                                         35      dennis Mart

 

        2019 Phone                   nullFlavo [facility] 3467

[id number] [facility]



        15:15:06 04:59:59 Message                 r       Medicine 04      dennis Hines

n

 

        2019 Phone                   nullFlavo [facility] 3467

[id number] [facility]



        15:15:06 04:59:59 Message                 r       Medicine 04      dennis Hines

n

 

        2019 Phone                   nullFlavo [facility]    [id number]

55 [facility]



        15:10:51 04:59:59 Message                 r       Nephrology 03      dennis Hines

n

 

        2019 Phone                   nullFlavo MG    [id number]

55 [facility]



        15:10:51 04:59:59 Message                 r       Nephrology 03      dennis Hines

n

 

        2019 Outpatient                 MG    MG    [id number]

355 



        10:15:06 23:59:59                                         04      

 

        2019 [facility]    [id number]

355 



        10:10:51 23:59:59                                         03      

 

        2019 Ambulatory                 nullFlavo MG    [id number]

[id number] [facility]



        20:00:00 20:00:00 Pre-Reg                 r       Nephrology 34      l



                                                        Rudy Hines

n

 

        2019 Ambulatory                 nullFlavo [facility]    [id number]

[id number] [facility]



        20:00:00 20:00:00 Pre-Reg                 r       Nephrology 34      dennis martinez

 

        2019 Outpatient                 [facility]    [id number]

365 [facility]



        15:00:00 15:00:00                                         34      dennis Mart

 

        2019 Outpatient         [place]    346

[id number] 



        15:00:00 15:00:00                 Tate Gallegos      



                                        Gabriela CLARK                         

 

        2019 Between                 nullFlavo MG    [id number]

75 [facility]



        20:15:16 20:15:16 Visit                   r       Nephrology 34      dennis Mart

 

        2019 Between                 nullFlavo MG    [id number]

75 [facility]



        20:15:16 20:15:16 Visit                   r       Nephrology 34      dennis Mart

 

        2019 Outpatient                 [facility]    [id number]

375 



        15:15:16 15:15:16                                         34      

 

        2019 Phone                   nullFlavo MG    [id number]

55 [facility]



        15:29:54 04:59:59 Message                 r       Nephrology 02      dennis Mart

 

        2019 Phone                   nullFlavo MG    [id number]

55 [facility]



        15:29:54 04:59:59 Message                 r       Nephrology 02      dennis Mart

 

        2019 Outpatient                 [facility]    [id number]

355 



        10:29:54 23:59:59                                         02      

 

        2019 Ambulatory                 nullFlavo MG    [id number]

[id number] [facility]



        16:30:00 16:30:00 Pre-Reg                 r       Nephrology 29      l



                                                        Rudy martinez

 

        2019 Ambulatory                 nullFlavo MG    [id number]

25937 [facility]



        16:30:00 16:30:00 Pre-Reg                 r       Nephrology 29      l



                                                        Rudy martinez

 

        2019 Ambulatory                 nullFlavo MG    [id number]

[id number] [facility]



        16:15:00 16:15:00 Pre-Reg                 r       Nephrology 30      l



                                                        Rudy martinez

 

        2019 Ambulatory                 nullFlavo MG    [id number]

[id number] [facility]



        16:15:00 16:15:00 Pre-Reg                 r       Nephrology 30      dennis martinez

 

        2019 Ambulatory                 [facility]Flavo [facility]

[id number] [facility]oria



        15:15:00 15:15:00 Pre-Reg                 r       Medicine 31      dennis martinez

 

        2019 Ambulatory                 [facility]Fl[facility]

[id number] [facility]



        15:15:00 15:15:00 Pre-Reg                 r       Medicine 31      dennis martinez

 

        2019 Outpatient                 [facility]    [facility]    [id number]

365 [facility]



        11:30:00 11:30:00                                         29      dennis



                                                                        [place]

 

        2019 Outpatient         [place]- MG    MG    [id number]

[id number] 



        11:30:00 11:30:00                 Samsonalyssia                   29      



                                        Gabriela EDUARDO                         

 

        2019 Outpatient                 [facility]    [facility]    [id number]

365 [facility]



        11:15:00 11:15:00                                         30      dennis



                                                                        [facility]

 

        2019 Outpatient         [place] MG    MG    346

[id number] 



        11:15:00 11:15:00                 Dacalyssia                   30      



                                        Gabriela EDUARDO                         

 

        2019 Outpatient                 [facility]    [facility]    [id number]

365 [facility]



        10:15:00 10:15:00                                         31      dennis



                                                                        [facility]

 

        2019 Outpatient         [place],  MG    MG    [id number]

365 



        10:15:00 10:15:00                 Charisse MARTINEZ                       

 

        2019 [place] PABLITO [place]ARRPABLITO   [facility] [place]    [id number]

[address]



        10:05:00 17:55:00                 GOPAL                         Medica

[facility]

 

        2019 Out[place] PABLITO ADEN   [facility] [place]    [id number]

[address] [place]



        21:36:00 19:00:00                 GOPAL                         Medica

[facility]

 

        2019 Outpatient                 nullFlavo [facility] Ur[facility] 346

[id number] [facility]



        20:40:00 04:59:59                         r       Care    33      l



                                                        Candace         [facility]

 

        2019 Outpatient                 nullFlavo [facility] 346

[id number] [facility]



        20:40:00 04:59:59                         r       Care    33      dennis Maria         [place]

 

        2019 Outpatient         Van     [facility]    [facility]    [id number]

365 



        15:40:00 23:59:59                 Mitch                 Yousif louise                           

 

        2019 Outpatient                 [facility]    [facility]    [id number]

365 [facility]



        15:40:00 15:40:00                                         33      dennis



                                                                        [place]

 

        2019 Outpatient                 nullFlavo [facility]

[id number] [facility]



        15:15:00 04:59:59                         r       Medicine 32      dennis Hines

michelle

 

        2019 Outpatient                 nullFlavo [facility]

[id number] [facility]



        15:15:00 04:59:59                         r       Medicine 32      dennis Hines

michelle

 

        2019 Outpatient         [facility]- [facility]    [facility]    346

[id number] 



        10:15:00 23:59:59                 Kristie Gallegos                         

 

        2019 Outpatient                 [facility]    [facility]    [id number]

365 [facility]



        10:15:00 10:15:00                                         32      dennis



                                                                        [place]

 

        2019 Between                 nullFlavo MG Family 3467

[id number] [facility]oria



        19:00:16 19:00:16 Visit                   r       Medicine 29      l



                                                        Grant         Donny

michelle

 

        2019 Between                 nullFlavo MG Family 3467

[id number] [facility]



        19:00:16 19:00:16 Visit                   r       Medicine 29      l



                                                        Grant         Donny martinez

 

        2019 Outpatient                 MG    MG    [id number]

375 



        14:00:16 14:00:16                                         29      

 

        2019 2019-03-15 Between                 nullFlavo [facility]    [id number]

75 [facility]



        15:02:37 15:02:37 Visit                   r       Nephrology 27      l



                                                        Grant         Donny martinez

 

        2019 2019-03-15 Between                 nullFlavo [facility]    [id number]

75 [facility]



        15:02:37 15:02:37 Visit                   r       Nephrology 27      l



                                                        Rudy martinez

 

        2019 2019-03-15 Outpatient                 MG    [facility]    [id number]

375 



        10:02:37 10:02:37                                         27      

 

        2019 2019-03-15 Outpatient                 nullFlavo [facility]    [id number]

[id number] [facility]



        18:45:00 04:59:59                         r       Nephrology 28      dennis Hines

n

 

        2019 2019-03-15 Outpatient                 nullFlavo MG    [id number]

[id number] [facility]



        18:45:00 04:59:59                         r       Nephrology 28      l



                                                        Rudy Hines

n

 

        2019 2019-03-15 Outpatient                 nullFlavo MG Family 3

[id number] [facility]oria



        14:15:00 04:59:59                         r       Medicine 22      dennis Hines

n

 

        2019 2019-03-15 Outpatient                 nullFlavo MG Family 3

[id number] Memoria



        14:15:00 04:59:59                         r       Medicine 22      dennis Hines

n

 

        2019 Outpatient         [place]- [facility]    346

[id number] 



        13:45:00 23:59:59                 Husam Gallegos                         

 

        2019 Outpatient         [place],  [facility]    [id number]

365 



        09:15:00 23:59:59                 Charisse MARTINEZ                 22      

 

        2019 Outpatient                 [facility]    [facility]    [id number]

365 [facility]



        13:45:00 13:45:00                                         28      dennis Mart

 

        2019 Outpatient                 [facility]    [facility]    [id number]

365 [facility]



        09:15:00 09:15:00                                         22      dennis Mart

 

        2019 Between                 nullFlavo MG    [id number]

75 [facility]



        23:59:47 23:59:47 Visit                   r       Nephrology 26      dennis Hines

michelle

 

        2019 Between                 nullFlavo MG    [id number]

75 [facility]



        23:59:47 23:59:47 Visit                   r       Nephrology 26      dennis Hines

michelle

 

        2019 Outpatient                 MG    MG    [id number]

375 



        18:59:47 18:59:47                                         26      

 

        2019 Between                 nullFlavo MG    [id number]

75 [facility]



        22:00:33 22:00:33 Visit                   r       Nephrology 24      l



                                                        Rudy Hines

michelle

 

        2019 Between                 nullFlavo MG    [id number]

75 [facility]



        22:00:33 22:00:33 Visit                   r       Nephrology 24      dennis Hines

n

 

        2019 Outpatient                 MG    MG    [id number]

375 



        16:00:33 16:00:33                                         24      

 

        2019 Between                 nullFlavo MG    [id number]

75 [facility]



        04:48:44 04:48:44 Visit                   r       Nephrology 23      dennis martinez

 

        2019 Between                 nullFlavo MG    [id number]

75 [facility]



        04:48:44 04:48:44 Visit                   r       Nephrology 23      dennis Hines

n

 

        2019 Outpatient                 [facility]    MG    [id number]

375 



        22:48:44 22:48:44                                         23      

 

        2019 Ambulatory                 nullFlavo MG    [id number]

[id number] [facility]



        20:30:00 20:30:00 Pre-Reg                 r       Nephrology 27      dennis martinez

 

        2019 Ambulatory                 nullFlavo MG    [id number]

[id number] [facility]



        20:30:00 20:30:00 Pre-Reg                 r       Nephrology 27      l



                                                        Rudy Hines

n

 

        2019 Ambulatory                 nullFlavo MG    [id number]

[id number] [facility]



        18:40:00 18:40:00 Pre-Reg                 r       Nephrology 24      dennis martinez

 

        2019 Ambulatory                 nullFlavo MG    [id number]

[id number] [facility]



        18:40:00 18:40:00 Pre-Reg                 r       Nephrology 24      dennis martinez

 

        2019 Outpatient                 MHIE    IE    [id number]

365 [facility]



        14:30:00 14:30:00                                         27      dennis Mart

 

        2019 Outpatient         [place]    MG    346

[id number] 



        14:30:00 14:30:00                 Zuleyma Gallegos                         

 

        2019 Outpatient         [place]    MG    346

[id number] 



        14:30:00 14:30:00                 Zuleyma Gallegos                         

 

        2019 Outpatient                 MHIE    IE    [id number]

365 [facility]



        12:40:00 12:40:00                                         24      dennis Mart

 

        2019 Outpatient         [place]    346

[id number] 



        12:40:00 12:40:00                 Michael Gallegos                         

 

        2019 Outpatient         [place]    346

[id number] 



        12:40:00 12:40:00                 Michael Gallegos                         

 

        2019 Ambulatory                 nullFlavo [facility]    [id number]

[id number] [facility]



        15:30:00 15:30:00 Pre-Reg                 r       Internal 25      l



                                                        [facility]         

 

        2019 Ambulatory                 nullFlavo [facility]    [id number]

[id number] Memoria



        15:30:00 15:30:00 Pre-Reg                 r       Internal 25      [facility]         

 

        2019 Outpatient                 [facility]    [id number]

365 [facility]



        09:30:00 09:30:00                                         [address] [place] [address]

 

        2019 Outpatient                 [facility]    [id number]

365 



        09:30:00 09:30:00                                         25      

 

        2018 2018-10-20 Ambulatory                 nullFlavo [facility]    [id number]

[id number] Mem[facility]



        12:45:00 12:45:00 Pre-Reg                 r       Internal 23      [facility]         

 

        2018 2018-10-20 Ambulatory                 nullFlavo [facility]    [id number]

[id number] [facility]



        12:45:00 12:45:00 Pre-Reg                 r       Internal 23      [facility]         

 

        2018 2018-10-20 Outpatient                 MG    [facility]    [id number]

365 



        07:45:00 07:45:00                                         23      

 

        2018 Outpatient                 nullFlavo MG    [id number]

[id number] [facility]



        19:15:00 04:59:59                         r       Nephrology 26      l



                                                        Rudy Hines

michelle

 

        2018 Outpatient                 nullFlavo MG    [id number]

[id number] [facility]



        19:15:00 04:59:59                         r       Nephrology 26      l



                                                        Rudy Hines

michelle

 

        2018 Outpatient                 nullFlavo MG    [id number]

[id number] [facility]



        18:00:00 04:59:59                         r       Nephrology 21      l



                                                        Grant         Donny martinez

 

        2018 Outpatient                 nullFlavo     [id number]

[id number] [facility]



        18:00:00 04:59:59                         r       Nephrology 21      l



                                                        Rudy Hines

michelle

 

        2018 [facility]    346

[id number] 



        14:15:00 23:59:59                 DacTay cade      



                                        Gabriela CLARK                         

 

        2018 [facility]

[id number] 



        13:00:00 23:59:59                 Dacalyssia                   21      



                                        Gabriela CLARK                         

 

        2018 Outpatient                 IE    IE    [id number]

365 [facility]



        14:15:00 14:15:00                                         26      dennis



                                                                        [place]

 

        2018 Outpatient                 MH[facility]    IE    [id number]

365 [facility]



        13:00:00 13:00:00                                         21      dennis



                                                                        [place]

 

        2018 Outpatient                 MHIE    IE    [id number]

365 [facility]



        07:45:00 07:45:00                                         23      dennis



                                                                        [place]

 

        2018 Outpatient                 nullFlavo MG Family 3

[id number] [facility]oria



        18:30:00 04:59:59                         r       Medicine 20      l



                                                        Rudy Hines

michelle

 

        2018 Outpatient                 nullFlavo MG Family 3

[id number] Memoria



        18:30:00 04:59:59                         r       Medicine 20      dennis Hines

n

 

        2018 Outpatient         [place],  MG    MG    [id number]

365 



        13:30:00 23:59:59                 Charisse MARTINEZ                 20      

 

        2018 Outpatient                 IE    IE    [id number]

365 [facility]



        13:30:00 13:30:00                                         20      dennis



                                                                        [place]

 

        2018 Outpatient                 nullFlavo MG    [id number]

[id number] [facility]



        16:00:00 04:59:59                         r       Nephrology 19      l



                                                        Rudy Hines

michelle

 

        2018 Outpatient                 nullFlavo MG    [id number]

[id number] [facility]



        16:00:00 04:59:59                         r       Nephrology 19      dennis Hines

michelle

 

        2018 [facility]    346

[id number] 



        11:00:00 23:59:59                 Daca                   19      



                                        Gabriela CLARK                         

 

        2018 Outpatient         [facility]- [facility]    MG    346

[id number] 



        11:00:00 23:59:59                 Toney Gallegos      



                                        Gabriela EDUARDO                         

 

        2018 Outpatient                 [facility]    [facility]    [id number]

365 [facility]



        11:00:00 11:00:00                                         19      dennis



                                                                        [place]

 

        2018 Outpatient                 nullFlavo [facility]

[id number] [facility]



        15:00:00 04:59:59                         r       Medicine 18      dennis Grant         Donny

michelle

 

        2018 Outpatient                 nullFlavo [facility]

[id number] [facility]oria



        15:00:00 04:59:59                         r       Medicine 18      dennis Palacioberg         Donny martinez

 

        2018 Outpatient         [place],  [facility]    [facility]    [id number]

365 



        10:00:00 23:59:59                 Charisse N                 18      

 

        2018 Outpatient         [place],  [facility]    MG    [id number]

365 



        10:00:00 23:59:59                 Charisse N                 18      

 

        2018 Outpatient                 [facility]    [facility]    [id number]

365 [facility]



        10:00:00 10:00:00                                         18      dennis Mart

 

        2017 Outpatient                 [facility]    [facility]    [id number]

365 [facility]



        10:40:00 10:40:00                                         16      dennis Mart

 

        2017 Outpatient                 [facility]    [facility]    [id number]

365 [facility]



        10:40:00 10:40:00                                         16      dennis Mart

 

        2017 Outpatient                 [facility]    [facility]    [id number]

365 [facility]



        11:30:00 11:30:00                                         17      dennis Mart

 

        2017 Outpatient                 [facility]    MHIE    [id number]

365 [facility]



        11:30:00 11:30:00                                         17      dennis Mart

 

        2017 Outpatient                 [facility]    [facility]    [id number]

365 [facility]



        11:40:00 11:40:00                                         11      dennis Mart

 

        2017 Outpatient                 [facility]    MH[facility]    [id number]

365 [facility]



        11:40:00 11:40:00                                         11      dennis Mart

 

        2017 Outpatient                 MH[facility]    MHIE    [id number]

365 [facility]



        14:30:00 14:30:00                                         15      dennis Mart

 

        2017 Outpatient                 [facility]    MH[facility]    [id number]

365 [facility]



        14:30:00 14:30:00                                         15      dennis Mart

 

        2017 Outpatient                 [facility]    MHIE    [id number]

365 [facility]



        10:00:00 10:00:00                                         08      dennis Mart

 

        2017 Outpatient                 [facility]    MH[facility]    [id number]

365 [facility]



        10:00:00 10:00:00                                         08      dennis Mart

 

        2017 Bedded                  nullFlavo [facility] [id number]

375 [facility]



        11:48:35 14:30:00 Outpatient                 r       [place] 13      dennis Harper



 

        2017 Bedded                  nullFlavo [facility] [id number]

375 [facility]



        11:48:35 14:30:00 Outpatient                 ankit Mart 13      dennis Harper



 

        2017 Outpatient         [place], [place]    [facility]    [id number]

[id number] 



        05:48:35 08:30:00                 Randy LEVIN                 13      

 

        2017 Outpatient                 [facility]    [facility]    [id number]

365 [facility]



        13:15:00 13:15:00                                         14      dennis Mart

 

        2017 Outpatient                 [facility]    [facility]    [id number]

365 [facility]



        13:15:00 13:15:00                                         14      dennis Mart

 

        2016 Outpatient                 [facility]    MH[facility]    [id number]

365 [facility]



        09:30:00 09:30:00                                         12      dennis Mart

 

        2016 Outpatient                 [facility]    MH[facility]    [id number]

365 [facility]



        09:30:00 09:30:00                                         13      dennis Mart

 

        2016 Outpatient                 MH[facility]    MH[facility]    [id number]

365 [facility]



        09:30:00 09:30:00                                         12      dennis Mart

 

        2016 Outpatient                 MH[facility]    MH[facility]    [id number]

365 [facility]



        09:30:00 09:30:00                                         13      dennis Mart

 

        2016 Outpatient                 [facility]    [facility]    [id number]

365 [facility]



        10:20:00 10:20:00                                         09      dennis Mart

 

        2016 Outpatient                 [facility]    [id number]

365 [facility]



        10:20:00 10:20:00                                         09      dennis Rosarioann

 

        2016 Outpatient                 [facility]    [id number]

365 [facility]



        13:00:00 13:00:00                                         04      l



                                                                        [facility]

 

        2016 Outpatient                 [facility]    [id number]

365 [facility]



        13:00:00 13:00:00                                         04      l



                                                                        [place]

 

        2016 Outpatient                 [facility]    [id number]

365 [facility]



        11:15:00 11:15:00                                         06      dennis



                                                                        [facility]

 

        2016 Outpatient                 [facility]    [id number]

365 [facility]



        11:15:00 11:15:00                                         06      dennis



                                                                        [facility]

 

        2016 OP Therapy                 nullFlavo [facility]     [id number]

[id number] [facility]



        13:00:00 04:59:00 Patients                 ankit Rajput 03      dennis Mart

 

        2016 OP Therapy                 nullFlavo [facility]     [id number]

[id number] [facility]



        13:00:00 04:59:00 Patients                 ankit Rajput 03      dennis Mart

 

        2016 Outpatient         Lei,   2.16.840. 2.16.840.1. 3

[id number] 



        08:00:00 23:59:00                 Yoan M [id number] 03    

  



                                                [id number] 5.55            

 

        2016 OP Therapy                 nullFlavo [facility]     [id number]

[id number] [facility]



        17:39:00 04:59:00 Patients                 ankit Rajput 02      dennis Rosarioann

 

        2016 OP Therapy                 nullFlavo [facility]     [id number]

[id number] [facility]



        17:39:00 04:59:00 Patients                 ankit       Regulo 02      dennis Mart

 

        2016 Outpatient         Lei,   2.16.840. 2.16.840.1. 3

[id number] 



        12:39:00 23:59:00                 Yoan M [id number] 02    

  



                                                [id number] 5.55            

 

        2016 OP Therapy                 nullFlavo [facility] 346

[id number] [facility]



        19:00:00 04:59:00 Patients                 r       [place]   01      dennis



                                                                        [facility]

 

        2016 OP Therapy                 nullFlavo [facility] 346

[id number] Mem[facility]



        19:00:00 04:59:00 Patients                 r       [place]   01      [facility]

 

        2016 Outpatient         Do   2.16.840. 2.16.840.1. 3

[id number] 



        14:00:00 23:59:00                 Yoan SUSIE [id number]. [id number] 01    

  



                                                [id number] 5.69            

 

        2016 Outpt Diag                 [facility]    [id number]

[id number] [facility]



        16:14:00 04:59:00 Services                 r       Outpatient 04      l



                                                        Imaging         [facility]



                                                        [facility]         

 

        2016 Outpt Diag                 [facility]    [id number]

[id number] [facility]



        16:14:00 04:59:00 Services                 r       Outpatient 04      l



                                                        Imaging         [facility]



                                                        Cardinal         

 

        2016 Outpatient         [place] [address]    346

[id number] 



        11:14:00 23:59:00                 Roland Gallegoseta EDUARDO                         

 

        2016 OP Therapy                 nullFlavo [facility] 346

[id number] [facility]



        19:00:00 04:59:00 Patients                 r       [place]   00      dennis



                                                                        [facility]

 

        2016 OP Therapy                 [facility] 346

[id number] [facility]



        19:00:00 04:59:00 Patients                 r       [place]   00      [facility]

 

        2016 Outpatient         Do   2.16.840. 2.16.840.1. 3

[id number] 



        14:00:00 23:59:00                 Yoan CHAVIS [id number]    

  



                                                [id number] 5.69            

 

        2016 [facility]                 [facility]    [id number]

365 [facility]



        10:20:00 10:20:00                                               [facility]

 

        2016 [facility]                 [facility]    [id number]

365 [facility]



        10:20:00 10:20:00                                               [facility]

 

        2016-05-10 2016 Outpt Diag                 [facility]    [id number]

[id number] [facility]



        14:59:00 04:59:00 Services                 r       Outpatient 03      l



                                                        Imaging         Barron Zarate            

 

        2016-05-10 2016 Outpt Diag                 [facility]    [id number]

[id number] [facility]



        14:59:00 04:59:00 Services                 r       Outpatient 03      l



                                                        Imaging         Barron Zarate            

 

        2016-05-10 2016-05-10 Outpatient         Abbi Somers28    28    346

[id number] 



        09:59:00 23:59:00                 Atkins                  2016 Outpt Diag                 [facility]    [id number]

[id number] [facility]



        18:11:00 04:59:00 Services                 r       Outpatient 01      l



                                                        Imaging         Barron Pinzon         

 

        2016 Outpt Diag                 [facility]    [id number]

[id number] [facility]



        18:11:00 04:59:00 Services                 r       Outpatient 01      l



                                                        Imaging         Barron



                                                        [facility]         

 

        2016 Outpatient         SomersAbbi pham 29    MH29    346

[id number] 



        13:11:00 23:59:00                 Atkins                  01      

 

        2016-03-10 2016-03-10 Outpatient                 2.16.840. 2.16.840.1. 3

[id number]   [facility]



        12:50:31 19:25:00                         [id number]         

l



                                                3.2081.20 81.2000         Donny

n



                                                00                      [facility]



                                                                        l



                                                                        [facility]

 

        2016-03-10 2016-03-10 Outpatient                 nullFlavo [facility]    [id number]

   [facility]



        12:50:31 19:25:00                         r                       l



                                                                        Barron

 

        2016-03-10 2016-03-10 Outpatient                 [facility]Flavo [facility]    [id number]

   [facility]



        12:50:31 19:25:00                         r                       l



                                                                        Barron

 

        2016 2016-02-10 Outpt Diag                 [facility]    [id number]

96470 [facility]



        12:58:00 05:59:00 Services                 r       Outpatient 00      l



                                                        Imaging         [place]         

 

        2016 2016-02-10 Outpt Diag                 [facility]    80204

[id number] [facility]



        12:58:00 05:59:00 Services                 r       Outpatient 00      l



                                                        Imaging         [facility]



                                                        Cardinal         

 

        2016 Outpatient         SomersAbbi pham 29    29    346

[id number] 



        06:58:00 23:59:00                 Atkins                  00      

 

        2016 [facility]                 [facility]    [id number]

365 [facility]



        10:15:00 10:15:00                                         02      dennis



                                                                        Barron

 

        2016 Outpatient                 [facility]    [id number]

365 [facility]



        10:15:00 10:15:00                                         02      dennis Mart

 

        2015 Outpatient                 [facility]    [id number]

365 [facility]



        11:30:00 11:30:00                                         00      dennis [place]

 

        2015 Outpatient                 [facility]    [id number]

365 [facility]



        11:30:00 11:30:00                                         00      [facility]

 

        2014 Outpatient                 nullFlavo [facility] 3467

2273_3 [facility]oria



        01:16:00 05:59:00                         r       Barron [id number] 



                                                        [facility]

 

        2014 Outpatient                 nullFlavo [facility] 3467

2273_3 Memoria



        01:16:00 05:59:00                         r       Barron [id number] 



                                                        [facility]

 

        2014 Outpatient         [facility] 2.16.840. 2.16.840.

1. [id number] 



        19:16:00 23:59:00                 , Vignesh [id number]        

 



                                        Caitlin  [id number] 5.0.100         



                                                00                      

 

        2014 Outpatient                 nullFlavo       [id number]

[id number] [facility]



        19:16:00 19:16:00                         r       [place]      [facility]

 

        2014 Outpatient                 nullFlavo       [id number]

[id number] [facility]



        19:16:00 19:16:00                         r       [place] 01      [facility]

 

        2014 Outpatient                 nullFlavo       [id number]

[id number] [facility]



        19:43:00 19:43:00                         r       [place]      



                                                                        [facility]

 

        2014 Outpatient                 nullFlavo       [id number]

[id number] [facility]



        19:43:00 19:43:00                         r       [place]      



                                                                        [facility]







Results







           Test Description Test Time  Test Comments Results    Result     Sour

e



                                                       Comments   

 

           PET/CT, CARDIAC 2022 Reason for ************************       

     



           PERF REST AND 16:05:00   exam:->angina ************************      

      



           STRESS                           ************[facility] CENTERName:            



                                            SUZI SEARS : 1955            



                                            Sex:                  



                                            F***********************            



                                            ************************            



                                            *************FINAL            



                                            REPORT PATIENT ID:            



                                            [id number] PROCEDURE:            



                                            MYOCARDIAL PERFUSION            



                                            PET/CT IMAGING            



                                            (Rest/Stress)CPT CODE:            



                                            40364 INDICATION:            



                                            Evaluation for chest            



                                            pain CARDIOVASCULAR            



                                            PROFILE:CAD History:            



                                            NoneSymptoms: Chest            



                                            painRisk Factors: Atrial            



                                            fibrillation, ESRDBMI:            



                                            34 STRESS             



                                            PROTOCOL:Pharmacologic            



                                            stress was achieved with            



                                            a 10-second intravenous            



                                            infusion of regadenoson            



                                            0.4 mg. The            



                                            radiopharmaceutical was            



                                            administered 30 seconds            



                                            after the start of the            



                                            regadenoson infusion.            



                                            IMAGING PROTOCOL:Limited            



                                            low-dose CT imaging was            



                                            performed for            



                                            attenuation correction.            



                                            39.9 mCi of Rb-82            



                                            chloride was injected            



                                            intravenously at rest,            



                                            and gated PET images            



                                            were obtained. Then,            



                                            39.9 mCi of Rb-82            



                                            chloride was injected            



                                            intravenously at peak            



                                            stress, and gated PET            



                                            images were obtained.            



                                            Image quality is good.            



                                            REST FINDINGS:HR:            



                                            79/minBP: 123/65            



                                            mmHgPrelim. EKG: Atrial            



                                            fibrillation with            



                                            incomplete RBBB and            



                                            nonspecific ST            



                                            changes.Perfusion:            



                                            Normal.Wall Motion:            



                                            Normal (LVEF >70%).LV            



                                            Volume: Normal. STRESS            



                                            FINDINGS:HR: 96/min (62%            



                                            of MPHR)BP: 116/70            



                                            mmHgPrelim. EKG: No            



                                            ischemic              



                                            changes.Symptoms:            



                                            Dyspnea (treatment not            



                                            required).Perfusion:            



                                            Normal.Wall Motion:            



                                            Normal (LVEF >70%).LV            



                                            Volume: Not            



                                            significantly changed            



                                            from rest. IMPRESSION:1.            



                                            Normal study.2. Normal            



                                            myocardial perfusion.3.            



                                            Normal resting LVEF,            



                                            which does not            



                                            deteriorate with            



                                            pharmacologic stress.4.            



                                            Normal extracardiac            



                                            tracer distribution.5.            



                                            There is no prior study            



                                            for comparison. Signed:            



                                            Lexy, Reggie MDReport            



                                            Verified Date/Time:            



                                            2022 16:05:42            



                                            Electronically signed            



                                            by: REGGIE GUDINO MD on            



                                            2022 04:05 PM            









                    BASIC METABOLIC PANEL 2022 06:01:00 









                      Test Item  Value      Reference Range Interpretation Comme

nts









             SODIUM (BEAKER) (test 140 meq/L    136-145                   



             code = 381)                                         

 

             POTASSIUM (BEAKER) 4.8 meq/L    3.5-5.1                   Specimen 

slightly hemolyzed



             (test code = 379)                                        

 

             CHLORIDE (BEAKER) (test 107 meq/L                        



             code = 382)                                         

 

             CO2 (BEAKER) (test code 21 meq/L     22-29        L            



             = 355)                                              

 

             BLOOD UREA NITROGEN 22 mg/dL     7-21         H            



             (BEAKER) (test code =                                        



             354)                                                

 

             CREATININE (BEAKER) 5.15 mg/dL   0.57-1.25    H            Specimen

 slightly hemolyzed



             (test code = 358)                                        

 

             GLUCOSE RANDOM (BEAKER) 97 mg/dL                         



             (test code = 652)                                        

 

             CALCIUM (BEAKER) (test 9.1 mg/dL    8.4-10.2                  



             code = 697)                                         

 

             EGFR (BEAKER) (test 9 mL/min/1.73 sq m                            I

nterpretation of eGFR values



             code = 1092)                                        Stage Descripti

on Result G1



                                                                 Normal or high 

>=90 G2 Mildly



                                                                 decreased 60-89

 G3a Mildly to



                                                                 moderately 45-5

9 G3b Moderately



                                                                 to severely 30-

44 G4 Severly



                                                                 decreased 15-29

 G5 Kidney



                                                                 failure <15Repo

rted eGFR is



                                                                 based on the CK

D-EPI 



                                                                 equation that d

oes not use a



                                                                 race coefficien

tEstimated GFR is



                                                                 not as accurate

 as Creatinine



                                                                 Clearance in pr

edicting



                                                                 glomerular filt

ration rate.



                                                                 Estimated GFR i

s not applicable



                                                                 for dialysis seng miller



 ID - MARCOSpecimen slightly [id number] 05:55:08





             Test Item    Value        Reference Range Interpretation Comments

 

             MAGNESIUM (BEAKER) 1.9 mg/dL    1.6-2.6                   Specimen 

slightly



             (test code = 627)                                        hemolyzed



 ID - [id number] 05:55:08





             Test Item    Value        Reference Range Interpretation Comments

 

             PHOSPHORUS (BEAKER) 3.4 mg/dL    2.3-4.7                   Specimen

 slightly



             (test code = 604)                                        hemolyzed



 ID - MARCOCBC W/PLT COUNT &amp; AUTO [id number] 04:43:14





             Test Item    Value        Reference Range Interpretation Comments

 

             WHITE BLOOD CELL COUNT 6.7 K/ L     3.5-10.5                  



             (BEAKER) (test code =                                        



             775)                                                

 

             RED BLOOD CELL COUNT 2.94 M/ L    3.93-5.22    L            



             (BEAKER) (test code =                                        



             761)                                                

 

             HEMOGLOBIN (BEAKER) 9.1 GM/DL    11.2-15.7    L            



             (test code = 410)                                        

 

             HEMATOCRIT (BEAKER) 30.4 %       34.1-44.9    L            



             (test code = 411)                                        

 

             MEAN CORPUSCULAR 103 fL       79-95        H            Discordant 

MCV



             VOLUME (BEAKER) (test                                        result

s compared to



             code = 753)                                         previous result

s;



                                                                 clinical correl

ation



                                                                 required.

 

             MEAN CORPUSCULAR 31.0 pg      25.6-32.2                 



             HEMOGLOBIN (BEAKER)                                        



             (test code = 751)                                        

 

             MEAN CORPUSCULAR 29.9 GM/DL   32.2-35.5    L            



             HEMOGLOBIN CONC                                        



             (BEAKER) (test code =                                        



             752)                                                

 

             RED CELL DISTRIBUTION 14.2 %       11.7-14.4                 



             WIDTH (BEAKER) (test                                        



             code = 412)                                         

 

             PLATELET COUNT 84 K/CU MM   150-450      L            



             (BEAKER) (test code =                                        



             756)                                                

 

             MEAN PLATELET VOLUME 10.3 fL      9.4-12.3                  



             (BEAKER) (test code =                                        



             754)                                                

 

             NUCLEATED RED BLOOD 0 /100 WBC   0-0                       



             CELLS (BEAKER) (test                                        



             code = 413)                                         

 

             NEUTROPHILS RELATIVE 75 %                                   



             PERCENT (BEAKER) (test                                        



             code = 429)                                         

 

             LYMPHOCYTES RELATIVE 16 %                                   



             PERCENT (BEAKER) (test                                        



             code = 430)                                         

 

             MONOCYTES RELATIVE 7 %                                    



             PERCENT (BEAKER) (test                                        



             code = 431)                                         

 

             EOSINOPHILS RELATIVE 2 %                                    



             PERCENT (BEAKER) (test                                        



             code = 432)                                         

 

             BASOPHILS RELATIVE 0 %                                    



             PERCENT (BEAKER) (test                                        



             code = 437)                                         

 

             NEUTROPHILS ABSOLUTE 5.05 K/ L    1.56-6.13                 



             COUNT (BEAKER) (test                                        



             code = 670)                                         

 

             LYMPHOCYTES ABSOLUTE 1.05 K/ L    1.18-3.74    L            



             COUNT (BEAKER) (test                                        



             code = 414)                                         

 

             MONOCYTES ABSOLUTE 0.44 K/ L    0.24-0.36    H            



             COUNT (BEAKER) (test                                        



             code = 415)                                         

 

             EOSINOPHILS ABSOLUTE 0.10 K/ L    0.04-0.36                 



             COUNT (BEAKER) (test                                        



             code = 416)                                         

 

             BASOPHILS ABSOLUTE 0.02 K/ L    0.01-0.08                 



             COUNT (BEAKER) (test                                        



             code = 417)                                         

 

             IMMATURE     0.60 %       0.00-1.00                 



             GRANULOCYTES-RELATIVE                                        



             PERCENT (BEAKER) (test                                        



             code = 2801)                                        



PROTEIN ELECTROPHORESIS, SERUM WITH REFLEX TO [id number] 16:42:33





             Test Item    Value        Reference Range Interpretation Comments

 

             ALBUMIN FRACTION 3.4 gm/dL    3.5-5.5      L            



             (BEAKER) (test code =                                        



             405)                                                

 

             ALPHA 1 FRACTION 0.4 gm/dL    0.2-0.4                   



             (BEAKER) (test code =                                        



             389)                                                

 

             ALPHA 2 FRACTION 0.7 gm/dL    0.4-1.0                   



             (BEAKER) (test code =                                        



             390)                                                

 

             BETA FRACTION 0.6 gm/dL    0.5-1.1                   



             (BEAKER) (test code =                                        



             392)                                                

 

             GAMMA GLOBULIN 0.8 gm/dL    0.7-1.6                   



             FRACTION (BEAKER)                                        



             (test code = 391)                                        

 

             INTERPRETATION-119 Albumin decreased. This                         

  



             (BEAKER) (test code = may indicate protein                         

  



             2615)        malnutrition or a                           



                          protein losing state.                           

 

             [id number] Meredith Reyes, MD                           



             (BEAKER) (test code = (electronic signature)                       

    



             2616)                                               

 

             PROTEIN TOTAL SERUM, 6.0 gm/dL    6.0-8.3                   



             SPEP (BEAKER) (test                                        



             code = 2660)                                        



Clinical  - SFOperator ID - CAITLYN BOperator ID - ADMPERIPHERAL BLOOD SMEAR
- PATHOLOGIST [id number] 14:33:15





             Test Item    Value        Reference Range Interpretation Comments

 

             PERIPHERAL SMR REVIEW Cell counts confirmed.                       

    



             (BEAKER) (test code = There is macrocytic                          

 



             2640)        anemia. Platelets are                           



                          decreased with no                           



                          significant platelet                           



                          clumps or satellitism                           



                          identified.                            

 

             [id number] Amelia Ken                          

 



             (BEAKER) (test code = M.D.                                   



             4119)                                               



HEPATITIS B SURFACE [id number] 10:29:09





             Test Item    Value        Reference Range Interpretation Comments

 

             HEPATITIS B SURFACE ANTIGEN (2) Nonreactive  Nonreactive           

    



             (BEAKER) (test code = 5905)                                        



Specimen is considered negative for HBsAg.Transthoracic 2D echo w/ doppler 
(cw/pw/color)2022 08:20:07Ejection FractionSLEH ECHO HEARTLAB MKCKESSON 
[facility] (cw/pw/color)
2022 08:20:07[facility]SLE [facility]pw/color[facility]2022 
08:20:07Ejection [facility]ractionSLE [facility]CHO [facility]cw/pw/color)2022 08:20:07Ejection 
FractionSLE ECHO [facility]
Transthoracic 2D echo w/ doppler (cw/pw/color)2022 08:20:07[facility]jection 
[facility]
[id number] 05:41:18





             Test Item    Value        Reference Range Interpretation Comments

 

             HEPATITIS C ANTIBODY (BEAKER) Nonreactive  Nonreactive             

  



             (test code = 367)                                        



 ID - EMMANUELBASIC METABOLIC [id number] 05:22:16





             Test Item    Value        Reference Range Interpretation Comments

 

             SODIUM (BEAKER) 142 meq/L    136-145                   



             (test code = 381)                                        

 

             POTASSIUM    4.5 meq/L    3.5-5.1                   



             (BEAKER) (test                                        



             code = 379)                                         

 

             CHLORIDE (BEAKER) 109 meq/L           H            



             (test code = 382)                                        

 

             CO2 (BEAKER) 21 meq/L     22-29        L            



             (test code = 355)                                        

 

             BLOOD UREA   37 mg/dL     7-21         H            



             NITROGEN (BEAKER)                                        



             (test code = 354)                                        

 

             CREATININE   8.14 mg/dL   0.57-1.25    H            



             (BEAKER) (test                                        



             code = 358)                                         

 

             GLUCOSE RANDOM 99 mg/dL                         



             (BEAKER) (test                                        



             code = 652)                                         

 

             CALCIUM (BEAKER) 9.0 mg/dL    8.4-10.2                  



             (test code = 697)                                        

 

             EGFR (BEAKER) 5                                        Interpretati

on of eGFR



             (test code = mL/min/1.73                            values Stage De

scription



             1092)        sq m                                   Result G1 Jeanette

l or high



                                                                 >=90 G2 Mildly 

decreased



                                                                 60-89 G3a Mildl

y to



                                                                 moderately 45-5

9 G3b



                                                                 Moderately to s

everely



                                                                 30-44 G4 Severl

y decreased



                                                                 15-29 G5 Kidney

 failure



                                                                 <15Reported eGF

R is based



                                                                 on the CKD-EPI 





                                                                 equation that d

oes not use



                                                                 a race



                                                                 coefficientEsti

mated GFR



                                                                 is not as accur

ate as



                                                                 Creatinine Mable acuna in



                                                                 predicting glom

erular



                                                                 filtration rate

. Estimated



                                                                 GFR is not appl

icable for



                                                                 dialysis patien

ts



 ID - [id number] 05:21:47





             Test Item    Value        Reference Range Interpretation Comments

 

             MAGNESIUM (BEAKER) (test code = 2.0 mg/dL    1.6-2.6               

    



             627)                                                



 ID - [id number] 05:21:47





             Test Item    Value        Reference Range Interpretation Comments

 

             PHOSPHORUS (BEAKER) (test code = 4.8 mg/dL    2.3-4.7      H       

     



             604)                                                



 ID - ROSALINOCBC W/PLT COUNT &amp; AUTO [id number] 05:05:48





             Test Item    Value        Reference Range Interpretation Comments

 

             WHITE BLOOD CELL COUNT (BEAKER) 7.0 K/ L     3.5-10.5              

    



             (test code = 775)                                        

 

             RED BLOOD CELL COUNT (BEAKER) 3.00 M/ L    3.93-5.22    L          

  



             (test code = 761)                                        

 

             HEMOGLOBIN (BEAKER) (test code = 9.4 GM/DL    11.2-15.7    L       

     



             410)                                                

 

             HEMATOCRIT (BEAKER) (test code = 32.0 %       34.1-44.9    L       

     



             411)                                                

 

             MEAN CORPUSCULAR VOLUME (BEAKER) 107 fL       79-95        H       

     



             (test code = 753)                                        

 

             MEAN CORPUSCULAR HEMOGLOBIN 31.3 pg      25.6-32.2                 



             (BEAKER) (test code = 751)                                        

 

             MEAN CORPUSCULAR HEMOGLOBIN CONC 29.4 GM/DL   32.2-35.5    L       

     



             (BEAKER) (test code = 752)                                        

 

             RED CELL DISTRIBUTION WIDTH 14.4 %       11.7-14.4                 



             (BEAKER) (test code = 412)                                        

 

             PLATELET COUNT (BEAKER) (test code 84 K/CU MM   150-450      L     

       



             = 756)                                              

 

             MEAN PLATELET VOLUME (BEAKER) 10.2 fL      9.4-12.3                

  



             (test code = 754)                                        

 

             NUCLEATED RED BLOOD CELLS (BEAKER) 0 /100 WBC   0-0                

       



             (test code = 413)                                        

 

             NEUTROPHILS RELATIVE PERCENT 81 %                                  

 



             (BEAKER) (test code = 429)                                        

 

             LYMPHOCYTES RELATIVE PERCENT 12 %                                  

 



             (BEAKER) (test code = 430)                                        

 

             MONOCYTES RELATIVE PERCENT 5 %                                    



             (BEAKER) (test code = 431)                                        

 

             EOSINOPHILS RELATIVE PERCENT 1 %                                   

 



             (BEAKER) (test code = 432)                                        

 

             BASOPHILS RELATIVE PERCENT 0 %                                    



             (BEAKER) (test code = 437)                                        

 

             NEUTROPHILS ABSOLUTE COUNT 5.69 K/ L    1.56-6.13                 



             (BEAKER) (test code = 670)                                        

 

             LYMPHOCYTES ABSOLUTE COUNT 0.84 K/ L    1.18-3.74    L            



             (BEAKER) (test code = 414)                                        

 

             MONOCYTES ABSOLUTE COUNT (BEAKER) 0.35 K/ L    0.24-0.36           

      



             (test code = 415)                                        

 

             EOSINOPHILS ABSOLUTE COUNT 0.08 K/ L    0.04-0.36                 



             (BEAKER) (test code = 416)                                        

 

             BASOPHILS ABSOLUTE COUNT (BEAKER) 0.02 K/ L    0.01-0.08           

      



             (test code = 417)                                        

 

             IMMATURE GRANULOCYTES-RELATIVE 0.60 %       0.00-1.00              

   



             PERCENT (BEAKER) (test code =                                      

  



             2801)                                               



BASIC METABOLIC [id number] 05:39:02





             Test Item    Value        Reference Range Interpretation Comments

 

             SODIUM (BEAKER) 141 meq/L    136-145                   



             (test code = 381)                                        

 

             POTASSIUM    5.0 meq/L    3.5-5.1                   



             (BEAKER) (test                                        



             code = 379)                                         

 

             CHLORIDE (BEAKER) 108 meq/L           H            



             (test code = 382)                                        

 

             CO2 (BEAKER) 22 meq/L     22-29                     



             (test code = 355)                                        

 

             BLOOD UREA   25 mg/dL     7-21         H            



             NITROGEN (BEAKER)                                        



             (test code = 354)                                        

 

             CREATININE   6.82 mg/dL   0.57-1.25    H            



             (BEAKER) (test                                        



             code = 358)                                         

 

             GLUCOSE RANDOM 102 mg/dL                        



             (BEAKER) (test                                        



             code = 652)                                         

 

             CALCIUM (BEAKER) 9.3 mg/dL    8.4-10.2                  



             (test code = 697)                                        

 

             EGFR (BEAKER) 6                                       Interpretatio

n of eGFR



             (test code = mL/min/1.73                            values Stage De

scription



             1092)        sq m                                   Result G1 Jeanette

l or high



                                                                 >=90 G2 Mildly 

decreased



                                                                 60-89 G3a Mildl

y to



                                                                 moderately 45-5

9 G3b



                                                                 Moderately to s

everely



                                                                 30-44 G4 Severl

y decreased



                                                                 15-29 G5 Kidney

 failure



                                                                 <15Reported eGF

R is based



                                                                 on the CKD-EPI 





                                                                 equation that d

oes not use



                                                                 a race



                                                                 coefficientEsti

mated GFR



                                                                 is not as accur

ate as



                                                                 Creatinine Mable acuna in



                                                                 predicting glom

erular



                                                                 filtration rate

. Estimated



                                                                 GFR is not appl

icable for



                                                                 dialysis patien

ts



 ID - CHERELLE [id number] 05:34:03





             Test Item    Value        Reference Range Interpretation Comments

 

             MAGNESIUM (BEAKER) (test code = 1.9 mg/dL    1.6-2.6               

    



             627)                                                



 ID - CHERELLE [id number] 05:34:03





             Test Item    Value        Reference Range Interpretation Comments

 

             PHOSPHORUS (BEAKER) (test code = 5.9 mg/dL    2.3-4.7      H       

     



             604)                                                



 ID - CHERELLE WCBC W/PLT COUNT &amp; AUTO [id number] 04:51:11





             Test Item    Value        Reference Range Interpretation Comments

 

             WHITE BLOOD CELL COUNT (BEAKER) 7.5 K/ L     3.5-10.5              

    



             (test code = 775)                                        

 

             RED BLOOD CELL COUNT (BEAKER) 2.88 M/ L    3.93-5.22    L          

  



             (test code = 761)                                        

 

             HEMOGLOBIN (BEAKER) (test code = 9.2 GM/DL    11.2-15.7    L       

     



             410)                                                

 

             HEMATOCRIT (BEAKER) (test code = 30.6 %       34.1-44.9    L       

     



             411)                                                

 

             MEAN CORPUSCULAR VOLUME (BEAKER) 106 fL       79-95        H       

     



             (test code = 753)                                        

 

             MEAN CORPUSCULAR HEMOGLOBIN 31.9 pg      25.6-32.2                 



             (BEAKER) (test code = 751)                                        

 

             MEAN CORPUSCULAR HEMOGLOBIN CONC 30.1 GM/DL   32.2-35.5    L       

     



             (BEAKER) (test code = 752)                                        

 

             RED CELL DISTRIBUTION WIDTH 14.3 %       11.7-14.4                 



             (BEAKER) (test code = 412)                                        

 

             PLATELET COUNT (BEAKER) (test code 87 K/CU MM   150-450      L     

       



             = 756)                                              

 

             MEAN PLATELET VOLUME (BEAKER) 10.4 fL      9.4-12.3                

  



             (test code = 754)                                        

 

             NUCLEATED RED BLOOD CELLS (BEAKER) 0 /100 WBC   0-0                

       



             (test code = 413)                                        

 

             NEUTROPHILS RELATIVE PERCENT 77 %                                  

 



             (BEAKER) (test code = 429)                                        

 

             LYMPHOCYTES RELATIVE PERCENT 15 %                                  

 



             (BEAKER) (test code = 430)                                        

 

             MONOCYTES RELATIVE PERCENT 5 %                                    



             (BEAKER) (test code = 431)                                        

 

             EOSINOPHILS RELATIVE PERCENT 1 %                                   

 



             (BEAKER) (test code = 432)                                        

 

             BASOPHILS RELATIVE PERCENT 0 %                                    



             (BEAKER) (test code = 437)                                        

 

             NEUTROPHILS ABSOLUTE COUNT 5.82 K/ L    1.56-6.13                 



             (BEAKER) (test code = 670)                                        

 

             LYMPHOCYTES ABSOLUTE COUNT 1.13 K/ L    1.18-3.74    L            



             (BEAKER) (test code = 414)                                        

 

             MONOCYTES ABSOLUTE COUNT (BEAKER) 0.39 K/ L    0.24-0.36    H      

      



             (test code = 415)                                        

 

             EOSINOPHILS ABSOLUTE COUNT 0.10 K/ L    0.04-0.36                 



             (BEAKER) (test code = 416)                                        

 

             BASOPHILS ABSOLUTE COUNT (BEAKER) 0.03 K/ L    0.01-0.08           

      



             (test code = 417)                                        

 

             IMMATURE GRANULOCYTES-RELATIVE 0.70 %       0.00-1.00              

   



             PERCENT (BEAKER) (test code =                                      

  



             2801)                                               



HIGH SENSITIVITY TROPONIN -08-60 18:47:20





             Test Item    Value        Reference Range Interpretation Comments

 

             HIGH SENSITIVITY 22 pg/ml     See_Comment  H             [Automated

 message]



             TROPONIN I (test code =                                        The 

system which



             1614247)                                            generated this 

result



                                                                 transmitted ref

erence



                                                                 range: <=17. Th

e



                                                                 reference range

 was



                                                                 not used to int

erpret



                                                                 this result as



                                                                 normal/abnormal

.



 ID - CAITLYN BThe  STAT High Sensitivity Troponin-I results 
should be used in conjunction with other diagnostic information such as ECG, 
clinical observations and information, and patient symptoms to aid in the 
diagnosis of MI.[id number] 18:35:03





             Test Item    Value        Reference Range Interpretation Comments

 

             POTASSIUM (BEAKER) (test code = 3.1 meq/L    3.5-5.1      L        

    



             379)                                                



 ID - CAITLYN BABAK, CHEST, 1 VIEW, NON [id number] 16:19:00Reason for 
exam:-&gt;pulm edemaShould this be performed at the bedside?-&gt;Yes
************************************************************[facility] : 1955 Sex: 
F************************************************************FINAL REPORT 
PATIENT ID: [id number] RAD, CHEST, 1 VIEW, NON DEPT INDICATION: pulm edema 
COMPARISON: None FINDINGS: Portable frontal view of the chest. IMPRESSION: 
Support Lines: Dialysis catheter tip overlies the right atrium. Lungs and 
pleura: Lungs are hypoinflated but otherwise unremarkable. No significant 
pulmonary edema or pleural effusions. No significant pneumothorax. Heart and 
mediastinum: Normal contours. Additional findings: None. Signed: Olga Greer Verified Date/Time: 2022 16:19:33 Electronically signed by: OLGA GREER MD on 2022 04:19 [id number] 16:00:35





             Test Item    Value        Reference Range Interpretation Comments

 

             THYROID STIMULATING HORMONE 1.121 uIU/mL 0.350-4.940               



             (BEAKER) (test code = 772)                                        



 ID - CAITLYN BHIGH SENSITIVITY TROPONIN -13-57 15:45:53





             Test Item    Value        Reference Range Interpretation Comments

 

             HIGH SENSITIVITY 23 pg/ml     See_Comment  H             [Automated

 message]



             TROPONIN I (test code =                                        The 

system which



             [id number])                                            generated this 

result



                                                                 transmitted ref

erence



                                                                 range: <=17. Th

e



                                                                 reference range

 was



                                                                 not used to int

erpret



                                                                 this result as



                                                                 normal/abnormal

.



 ID - CAITLYN BThe  STAT High Sensitivity Troponin-I results 
should be used in conjunction with other diagnostic information such as ECG, 
clinical observations and information, and patient symptoms to aid in the 
diagnosis of MI.B-TYPE NATRIURETIC FACTOR (BNP)2022 15:45:53





             Test Item    Value        Reference Range Interpretation Comments

 

             B-TYPE NATRIURETIC PEPTIDE (BEAKER) 395 pg/mL    0-100        H    

        



             (test code = 700)                                        



 ID - CAITLYN BCOMPREHENSIVE METABOLIC [id number] 15:42:17





             Test Item    Value        Reference Range Interpretation Comments

 

             TOTAL PROTEIN 6.2 gm/dL    6.0-8.3                   



             (BEAKER) (test                                        



             code = 770)                                         

 

             ALBUMIN (BEAKER) 3.5 g/dL     3.5-5.0                   



             (test code = 1145)                                        

 

             ALKALINE     109 U/L                          



             PHOSPHATASE                                         



             (BEAKER) (test                                        



             code = 346)                                         

 

             BILIRUBIN TOTAL 1.2 mg/dL    0.2-1.2                   



             (BEAKER) (test                                        



             code = 377)                                         

 

             SODIUM (BEAKER) 141 meq/L    136-145                   



             (test code = 381)                                        

 

             POTASSIUM (BEAKER) 3.1 meq/L    3.5-5.1      L            



             (test code = 379)                                        

 

             CHLORIDE (BEAKER) 105 meq/L                        



             (test code = 382)                                        

 

             CO2 (BEAKER) (test 25 meq/L     22-29                     



             code = 355)                                         

 

             BLOOD UREA   19 mg/dL     7-21                      



             NITROGEN (BEAKER)                                        



             (test code = 354)                                        

 

             CREATININE   5.92 mg/dL   0.57-1.25    H            



             (BEAKER) (test                                        



             code = 358)                                         

 

             GLUCOSE RANDOM 109 mg/dL           H            



             (BEAKER) (test                                        



             code = 652)                                         

 

             CALCIUM (BEAKER) 9.0 mg/dL    8.4-10.2                  



             (test code = 697)                                        

 

             AST (SGOT)   11 U/L       5-34                      



             (BEAKER) (test                                        



             code = 353)                                         

 

             ALT (SGPT)   11 U/L       6-55                      



             (BEAKER) (test                                        



             code = 347)                                         

 

             EGFR (BEAKER) 7                                       Interpretatio

n of eGFR



             (test code = 1092) mL/min/1.73                            values St

age Description



                          sq m                                   Result G1 Jeanette

l or high



                                                                 >=90 G2 Mildly 

decreased



                                                                 60-89 G3a Mildl

y to



                                                                 moderately 45-5

9 G3b



                                                                 Moderately to s

everely



                                                                 30-44  G4 Sever

ly



                                                                 decreased 15-29

 G5 Kidney



                                                                 failure <15Repo

rted eGFR



                                                                 is based on the

 CKD-EPI



                                                                 2021 equation t

hat does



                                                                 not use a race



                                                                 coefficientEsti

mated GFR



                                                                 is not as accur

ate as



                                                                 Creatinine Mable acuna in



                                                                 predicting glom

erular



                                                                 filtration rate

. Estimated



                                                                 GFR is not appl

icable for



                                                                 dialysis patien

ts



 ID - CAITLYN [id number] 15:39:33





             Test Item    Value        Reference Range Interpretation Comments

 

             MAGNESIUM (BEAKER) (test code = 1.9 mg/dL    1.6-2.6               

    



             627)                                                



 ID - CAITLYN [id number] 15:39:33





             Test Item    Value        Reference Range Interpretation Comments

 

             PHOSPHORUS (BEAKER) (test code = 5.0 mg/dL    2.3-4.7      H       

     



             604)                                                



 ID - CAITLYN BCBC W/PLT COUNT &amp; AUTO [id number] 15:23:08





             Test Item    Value        Reference Range Interpretation Comments

 

             WHITE BLOOD CELL COUNT (BEAKER) 5.6 K/ L     3.5-10.5              

    



             (test code = 775)                                        

 

             RED BLOOD CELL COUNT (BEAKER) 2.85 M/ L    3.93-5.22    L          

  



             (test code = 761)                                        

 

             HEMOGLOBIN (BEAKER) (test code = 8.9 GM/DL    11.2-15.7    L       

     



             410)                                                

 

             HEMATOCRIT (BEAKER) (test code = 29.9 %       34.1-44.9    L       

     



             411)                                                

 

             MEAN CORPUSCULAR VOLUME (BEAKER) 105 fL       79-95        H       

     



             (test code = 753)                                        

 

             MEAN CORPUSCULAR HEMOGLOBIN 31.2 pg      25.6-32.2                 



             (BEAKER) (test code = 751)                                        

 

             MEAN CORPUSCULAR HEMOGLOBIN CONC 29.8 GM/DL   32.2-35.5    L       

     



             (BEAKER) (test code = 752)                                        

 

             RED CELL DISTRIBUTION WIDTH 14.0 %       11.7-14.4                 



             (BEAKER) (test code = 412)                                        

 

             PLATELET COUNT (BEAKER) (test code 67 K/CU MM   150-450      L     

       



             = 756)                                              

 

             MEAN PLATELET VOLUME (BEAKER) 9.6 fL       9.4-12.3                

  



             (test code = 754)                                        

 

             NUCLEATED RED BLOOD CELLS (BEAKER) 0 /100 WBC   0-0                

       



             (test code = 413)                                        

 

             NEUTROPHILS RELATIVE PERCENT 76 %                                  

 



             (BEAKER) (test code = 429)                                        

 

             LYMPHOCYTES RELATIVE PERCENT 15 %                                  

 



             (BEAKER) (test code = 430)                                        

 

             MONOCYTES RELATIVE PERCENT 7 %                                    



             (BEAKER) (test code = 431)                                        

 

             EOSINOPHILS RELATIVE PERCENT 1 %                                   

 



             (BEAKER) (test code = 432)                                        

 

             BASOPHILS RELATIVE PERCENT 0 %                                    



             (BEAKER) (test code = 437)                                        

 

             NEUTROPHILS ABSOLUTE COUNT 4.27 K/ L    1.56-6.13                 



             (BEAKER) (test code = 670)                                        

 

             LYMPHOCYTES ABSOLUTE COUNT 0.86 K/ L    1.18-3.74    L            



             (BEAKER) (test code = 414)                                        

 

             MONOCYTES ABSOLUTE COUNT (BEAKER) 0.37 K/ L    0.24-0.36    H      

      



             (test code = 415)                                        

 

             EOSINOPHILS ABSOLUTE COUNT 0.05 K/ L    0.04-0.36                 



             (BEAKER) (test code = 416)                                        

 

             BASOPHILS ABSOLUTE COUNT (BEAKER) 0.02 K/ L    0.01-0.08           

      



             (test code = 417)                                        

 

             IMMATURE GRANULOCYTES-RELATIVE 0.50 %       0.00-1.00              

   



             PERCENT (BEAKER) (test code =                                      

  



             2801)                                               



ECG 12 [id number] 22:49:39





             Test Item    Value        Reference Range Interpretation Comments

 

             Ventricular rate (test                                        



             code = 253)                                         

 

             QRSD interval (test                                        



             code = 260)                                         

 

             QT interval (test code                                        



             = 264)                                              

 

             QTC interval (test code                                        



             = 265)                                              

 

             QRS axis 1 (test code =                                        



             268)                                                

 

             T wave axis (test code                                        



             = 270)                                              

 

             EKG impression (test Atrial                                 



             code = 273)  fibrillation-Rightward                           



                          axis-ST & T wave                           



                          abnormality, consider                           



                          inferior                               



                          ischemia-Abnormal                           



                          ECG-In automated                           



                          comparison with ECG of                           



                          2022 02:20,-QRS                           



                          axis shifted                           



                          right-Electronically                           



                          Signed By Moris WIGGINS,                           



                          Neftali KGisele () on                           



                          2022 4:49:37 PM                           



[address]2022-11-06 22:49:39





             Test Item    Value        Reference Range Interpretation Comments

 

             Ventricular rate (test                                        



             code = 253)                                         

 

             QRSD interval (test                                        



             code = 260)                                         

 

             QT interval (test code                                        



             = 264)                                              

 

             QTC interval (test code                                        



             = 265)                                              

 

             QRS axis 1 (test code =                                        



             268)                                                

 

             T wave axis (test code                                        



             = 270)                                              

 

             EKG impression (test Atrial                                 



             code = 273)  fibrillation-Rightward                           



                          axis-ST & T wave                           



                          abnormality, consider                           



                          inferior                               



                          ischemia-Abnormal                           



                          ECG-In automated                           



                          comparison with ECG of                           



                          2022 02:20,-QRS                           



                          axis shifted                           



                          right-Electronically                           



                          Signed By Moris WIGGINS                           



                          Neftali KGisele () on                           



                          2022 4:49:37 PM                           



[address]2022-11-06 22:49:39





             Test Item    Value        Reference Range Interpretation Comments

 

             Ventricular rate (test                                        



             code = 253)                                         

 

             QRSD interval (test                                        



             code = 260)                                         

 

             QT interval (test code                                        



             = 264)                                              

 

             QTC interval (test code                                        



             = 265)                                              

 

             QRS axis 1 (test code =                                        



             268)                                                

 

             T wave axis (test code                                        



             = 270)                                              

 

             EKG impression (test Atrial                                 



             code = 273)  fibrillation-Rightward                           



                          axis-ST & T wave                           



                          abnormality, consider                           



                          inferior                               



                          ischemia-Abnormal                           



                          ECG-In automated                           



                          comparison with ECG of                           



                          2022 02:20,-QRS                           



                          axis shifted                           



                          right-Electronically                           



                          Signed By Neftali Subramanian MD (2008) on                           



                          2022 4:49:37 PM                           



[facility] Respiratory specimen 
by EDWARD with probe [id number] 04:29:38





             Test Item    Value        Reference Range Interpretation Comments

 

             SARS-CoV-2 (COVID-19) RNA Not detected                           



             [Presence] in Respiratory                                        



             specimen by EDWARD with probe                                        



             detection (test code = 15927-1)                                    

    

 

             Whether patient is employed in a Unknown                           

     



             healthcare setting (test code =                                    

    



             04726-7)                                            

 

             Whether the patient has symptoms Unknown                           

     



             related to condition of interest                                   

     



             (test code = 26886-9)                                        

 

             Whether the patient was Unknown                                



             hospitalized for condition of                                      

  



             interest (test code = 31126-0)                                     

   

 

             Whether the patient was admitted Unknown                           

     



             to intensive care unit (ICU) for                                   

     



             condition of interest (test code                                   

     



             = 95017-3)                                          

 

             Whether patient resides in a Unknown                               

 



             congregate care setting (test                                      

  



             code = 10215-2)                                        

 

             Pregnancy status (test code = Unknown                              

  



             05620-2)                                            

 

             Date and time of symptom onset Unknown                             

   



             (test code = 05944-4)                                        



[facility] ED Preliminary Interpretation - Not an 
[id number] 03:37:43





             Test Item    Value        Reference Range Interpretation Comments

 

             SUGEY (test code = SUGEY) Suresh Pineda MD 2022 3:33                           



                          [facility] ED Preliminary                           



                          Interpretation - Not                           



                          an OrderPerformed by:                           



                          Suresh Pineda MDAuthorized by:                           



                          Suresh Pineda MD ECG reviewed by ED                           



                          Physician in the                           



                          absence of a                           



                          cardiologist: yes                           



                          Interpretation:                           



                          Interpretation:                           



                          abnormal Quality:                           



                          Tracing quality:                           



                          Limited by                             



                          artifactRate: ECG                           



                          rate: 94 ECG rate                           



                          assessment: normal                           



                          Rhythm: Rhythm: atrial                           



                          fibrillation Ectopy:                           



                          Ectopy: none QRS: QRS                           



                          axis: Right QRS                           



                          intervals:                             



                          NormalConduction:                           



                          Conduction: normal ST                           



                          segments: ST segments:                           



                          NormalT waves: T                           



                          waves: non-specific                           



                          Comments: QRS 82. QTc                           



                          420.                                   

 

             Lab Interpretation Abnormal                               



             (test code = 73486-3)                                        



[facility] ED Preliminary Interpretation - Not an [id number] 
03:37:43





             Test Item    Value        Reference Range Interpretation Comments

 

             SUGEY (test code = SUGEY) Suresh Pineda MD 2022 3:33                           



                          [facility] ED Preliminary                           



                          Interpretation - Not                           



                          an OrderPerformed by:                           



                          Suresh Pineda MDAuthorized by:                           



                          Suresh Pineda MD ECG reviewed by ED                           



                          Physician in the                           



                          absence of a                           



                          cardiologist: yes                           



                          Interpretation:                           



                          Interpretation:                           



                          abnormal Quality:                           



                          Tracing quality:                           



                          Limited by                             



                          artifactRate: ECG                           



                          rate: 94 ECG rate                           



                          assessment: normal                           



                          Rhythm: Rhythm: atrial                           



                          fibrillation Ectopy:                           



                          Ectopy: none QRS: QRS                           



                          axis: Right QRS                           



                          intervals:                             



                          NormalConduction:                           



                          Conduction: normal ST                           



                          segments: ST segments:                           



                          NormalT waves: T                           



                          waves: non-specific                           



                          Comments: QRS 82. QTc                           



                          420.                                   

 

             Lab Interpretation Abnormal                               



             (test code = 13093-2)                                        



[id number]





             Test Item    Value        Reference    Interpretation Comments



                                       Range                     

 

             PTH (test code = 146 pg/mL    16-77        H             Interpreti

ve Guide Intact



             2731-8)                                             PTH



                                                                 Calcium--------

----------



                                                                 ---------- ----

---Normal



                                                                 Parathyroid Nor

mal



                                                                 NormalHypoparat

hyroidism



                                                                 Low or Low Norm

al



                                                                 LowHyperparathy

roidism



                                                                 Primary Normal 

or High



                                                                 High Secondary 

High Normal



                                                                 or Low Tertiary

 High



                                                                 HighNon-Parathy

roid



                                                                 Hypercalcemia L

ow or Low



                                                                 Normal High

 

             SUGEY (test code = FASTING:NO                             



             SUGEY)         FASTING: NO                            

 

             RAC (test code = Performing                             



             RAC)         Organization                           



                          Information:                           



                          Site ID: [facility]                           



                          Name: [facility]                               



                          Address: [address] [place] [address]                            



                          [id number]                             



                          Director:                              



                          Vignesh Felix                           

 

             Lab          Abnormal                               



             Interpretation                                        



             (test code =                                        



             48017-4)                                            



[facility] [id number] 17:49:00





             Test Item    Value        Reference Range Interpretation Comments

 

             TSH (test                 See_Comment                [Automated mes

zia]



             code =                                              The system ic

h



             3016-3)                                             generated this



                                                                 result transmit

kolby



                                                                 reference range

:



                                                                 0.40 - 4.50 mIU

/L.



                                                                 The reference r

cheyenne



                                                                 was not used to



                                                                 interpret this



                                                                 result as



                                                                 normal/abnormal

.

 

             SUGEY (test    FASTING:NO FASTING:                           



             code = SUGEY)  NO                                     

 

             RAC (test    Performing                             



             code = RAC)  Organization                           



                          Information: Site ID:                           



                          A [facility]                           



                          Lab Address: [address] [place] [address]                            



                          [id number] Director:                           



                          Vignesh Greenwoodridge                           



[facility] [id number] 17:49:00





             Test Item    Value        Reference Range Interpretation Comments

 

             Vitamin D,   82 ng/mL                         Vitamin D Statu

s



             25-hydroxy (test                                        25-OH Vitam

in D:



             code = -3)                                        Deficiency: <

20



                                                                 ng/mLInsufficie

ncy



                                                                 : 20 - 29



                                                                 ng/mLOptimal: >

 or



                                                                 = 30 ng/mL For



                                                                 25-OH Vitamin D



                                                                 testing on



                                                                 patients on



                                                                 D2-supplementat

ion



                                                                 and patients fo

r



                                                                 whom quantitati

on



                                                                 of D2 and D3



                                                                 fractions is



                                                                 required, the



                                                                 QuestAssureD(TM

)25



                                                                 -OH VIT D,



                                                                 (D2,D3), LC/MS/

MS



                                                                 is recommended:



                                                                 order code 9288

8



                                                                 (patients



                                                                 >2yrs).See Note

 1



                                                                 Note 1 For



                                                                 additional



                                                                 information,



                                                                 please refer to



                                                                 http://educatio

n.Q



                                                                 [facility]

.co



                                                                 m/faq/WPR345 (T

his



                                                                 link is being



                                                                 provided for



                                                                 informational/e

radu



                                                                 ational purpose

s



                                                                 only.)

 

             SUGEY (test code = FASTING:NO FASTING:                           



             SUGEY)         NO                                     

 

             RAC (test code = Performing                             



             RAC)         Organization                           



                          Information: Site                           



                          ID: RGA Name: [facility]                           



                          Lab Address: [address] [place] [address]                            



                          [id number] Director:                           



                          Vignesh Felix                           



[facility] [id number] 17:49:00





             Test Item    Value        Reference    Interpretation Comments



                                       Range                     

 

             PTH (test code = 146 pg/mL    16-77        H             Interpreti

ve Guide Intact



             2731-8)                                             PTH



                                                                 Calcium--------

----------



                                                                 ---------- ----

---Normal



                                                                 Parathyroid Nor

mal



                                                                 NormalHypoparat

hyroidism



                                                                 Low or Low Norm

al



                                                                 LowHyperparathy

roidism



                                                                 Primary Normal 

or High



                                                                 High Secondary 

High Normal



                                                                 or Low Tertiary

 High



                                                                 HighNon-Parathy

roid



                                                                 Hypercalcemia L

ow or Low



                                                                 Normal High

 

             SUGEY (test code = FASTING:NO                             



             SUGEY)         FASTING: NO                            

 

             RAC (test code = Performing                             



             RAC)         Organization                           



                          Information:                           



                          Site ID: RGA                           



                          Name: [employer]                            



                          [facility]                               



                          Address: [address] [place] [address]                            



                          [id number]                             



                          Director:                              



                          Vignesh Greenwoodridge                           

 

             Lab          Abnormal                               



             Interpretation                                        



             (test code =                                        



             26859-4)                                            



[facility] [id number] 17:49:00





             Test Item    Value        Reference Range Interpretation Comments

 

             TSH (test                 See_Comment                [Automated mes

zia]



             code =                                              The system whic

h



             3016-3)                                             generated this



                                                                 result transmit

kolby



                                                                 reference range

:



                                                                 0.40 - 4.50 mIU

/L.



                                                                 The reference r

cheyenne



                                                                 was not used to



                                                                 interpret this



                                                                 result as



                                                                 normal/abnormal

.

 

             SUGEY (test    FASTING:NO FASTING:                           



             code = SUGEY)  NO                                     

 

             RAC (test    Performing                             



             code = RAC)  Organization                           



                          Information: Site ID:                           



                          [employer] Name: [facility]                           



                          Lab Address: [address] [place] [address]                            



                          [id number]  Director:                           



                          [facility] [id number] 17:49:00





             Test Item    Value        Reference Range Interpretation Comments

 

             Vitamin D,   82 ng/mL                         Vitamin D Statu

s



             25-hydroxy (test                                        25-OH Vitam

in D:



             code = 1989-3)                                        Deficiency: <

20



                                                                 ng/mLInsufficie

ncy



                                                                 : 20 - 29



                                                                 ng/mLOptimal: >

 or



                                                                 = 30 ng/mL For



                                                                 25-OH Vitamin D



                                                                 testing on



                                                                 patients on



                                                                 D2-supplementat

ion



                                                                 and patients fo

r



                                                                 whom quantitati

on



                                                                 of D2 and D3



                                                                 fractions is



                                                                 required, the



                                                                 QuestAssureD(TM

)25



                                                                 -OH VIT D,



                                                                 (D2,D3), LC/MS/

MS



                                                                 is recommended:



                                                                 order code 9288

8



                                                                 (patients



                                                                 >2yrs).See Note

 1



                                                                 Note 1 For



                                                                 additional



                                                                 information,



                                                                 please refer to



                                                                 http://educatio

n.Q



                                                                 [facility]

.co



                                                                 m/faq/QIV368 (T

his



                                                                 link is being



                                                                 provided for



                                                                 informational/e

radu



                                                                 ational purpose

s



                                                                 only.)

 

             SUGEY (test code = FASTING:NO FASTING:                           



             SUGEY)         NO                                     

 

             RAC (test code = Performing                             



             RAC)         Organization                           



                          Information: Site                           



                          ID: RGA  Name: [facility]                           



                          Lab Address: [address] [place] [address]                            



                          [phone] Director:                           



                          Vignesh DENNIS [facility] [id number] 17:49:00





             Test Item    Value        Reference    Interpretation Comments



                                       Range                     

 

             PTH (test code = 146 pg/mL    16-77        H             Interpreti

ve Guide Intact



             2731-8)                                             PTH



                                                                 Calcium--------

----------



                                                                 ---------- ----

---Normal



                                                                 Parathyroid Nor

mal



                                                                 NormalHypoparat

hyroidism



                                                                 Low or Low Norm

al



                                                                 LowHyperparathy

roidism



                                                                 Primary Normal 

or High



                                                                 High Secondary 

High Normal



                                                                 or Low Tertiary

 High



                                                                 HighNon-Parathy

roid



                                                                 Hypercalcemia L

ow or Low



                                                                 Normal High

 

             SUGEY (test code = FASTING:NO                             



             SUGEY)         FASTING: NO                            

 

             RAC (test code = Performing                             



             RAC)         Organization                           



                          Information:                           



                          Site ID: [facility]                           



                          Name: [facility]                           



                          [facility]                               



                          Address: [address] [place] [address]                            



                          [phone]                             



                          Director:                              



                          Vignesh Felix                           

 

             Lab          Abnormal                               



             Interpretation                                        



             (test code =                                        



             92094-3)                                            



[facility] [id number] 17:49:00





             Test Item    Value        Reference Range Interpretation Comments

 

             TSH (test                 See_Comment                [Automated mes

zia]



             code =                                              The system whic

h



             3016-3)                                             generated this



                                                                 result transmit

kolby



                                                                 reference range

:



                                                                 0.40 - 4.50 mIU

/L.



                                                                 The reference r

cheyenne



                                                                 was not used to



                                                                 interpret this



                                                                 result as



                                                                 normal/abnormal

.

 

             SUGEY (test    FASTING:NO FASTING:                           



             code = SUGEY)  NO                                     

 

             RAC (test    Performing                             



             code = RAC)  Organization                           



                          Information: Site ID:                           



                          [facility] Name: [facility]                           



                          [facility]                           



                          Lab Address: [address] [place] [address] Director:                           



                          Vignesh Felix                           



[facility] 25 hydroxy [id number] 17:49:00





             Test Item    Value        Reference Range Interpretation Comments

 

             Vitamin D,   82 ng/mL                         Vitamin D Statu

s



             25-hydroxy (test                                        25-OH Vitam

in D:



             code = 1989-3)                                        Deficiency: <

20



                                                                 ng/mLInsufficie

ncy



                                                                 : 20 - 29



                                                                 ng/mLOptimal: >

 or



                                                                 = 30 ng/mL For



                                                                 25-OH Vitamin D



                                                                 testing on



                                                                 patients on



                                                                 D2-supplementat

ion



                                                                 and patients fo

r



                                                                 whom quantitati

on



                                                                 of D2 and D3



                                                                 fractions is



                                                                 required, the



                                                                 QuestAssureD(TM

)25



                                                                 -OH VIT D,



                                                                 (D2,D3), LC/MS/

MS



                                                                 is recommended:



                                                                 order code 9288

8



                                                                 (patients



                                                                 >2yrs).See Note

 1



                                                                 Note 1 For



                                                                 additional



                                                                 information,



                                                                 please refer to



                                                                 http://educatio

n.Q



                                                                 [facility]

.co



                                                                 m/faq/YKP720 (T

his



                                                                 link is being



                                                                 provided for



                                                                 informational/e

radu



                                                                 ational purpose

s



                                                                 only.)

 

             SUGEY (test code = FASTING:NO FASTING:                           



             SUGEY)         NO                                     

 

             RAC (test code = Performing                             



             RAC)         Organization                           



                          Information: Site                           



                          ID: ANTWON Name: [facility]                           



                          Lab Address: [address] [place] [address]                            



                          [phone] Director:                           



                          Vignesh Felix                           



[facility]2022-09-07 04:51:00





             Test Item    Value        Reference Range Interpretation Comments

 

             POC glucose (test code = 129 mg/dL    65-99        H            Ope

rator Name:



             03475-6)                                            [facility]



                                                                 [place]

ce



                                                                 ID:



                                                                 [id number]~Ulzmaria

table



                                                                 : HMW Notified 

RN

 

             Lab Interpretation (test Abnormal                               



             code = 32088-5)                                        



[facility]2022-09-07 04:51:00





             Test Item    Value        Reference Range Interpretation Comments

 

             POC glucose (test code = 129 mg/dL    65-99        H            Ope

rator Name:



             92254-0)                                            [facility]



                                                                 [place]

ce



                                                                 ID:



                                                                 [id number]~Luzmaria

table



                                                                 : HMW Notified 

RN

 

             Lab Interpretation (test Abnormal                               



             code = 83292-1)                                        



[facility]2022-09-07 04:51:00





             Test Item    Value        Reference Range Interpretation Comments

 

             POC glucose (test code = 129 mg/dL    65-99        H            Ope

rator Name:



             [id number])                                            [facility]



                                                                 [place]

ce



                                                                 ID:



                                                                 [id number]

table



                                                                 : HMW Notified 

RN

 

             Lab Interpretation (test Abnormal                               



             code = 64002-8)                                        



[facility] Cont[facility], Strain and
3D if needed)2022 14:28:07





             Test Item    Value        Reference    Interpretation Comments



                                       Range                     

 

             RA pressure (test              mmHg                      



             code = 7263813664)                                        

 

             EF (test code = 50 %         54-74        A            



             2625551757)                                         

 

             IVS,d (test code = 0.92 cm      0.6-0.9      A            



             0548865403)                                         

 

             IVS s 2D (test code 1.08 cm                                



             = 2395794654)                                        

 

             LVPWD,d (test code = 0.93 cm      0.60-1.19                 



             0037376505)                                         

 

             LVPW s PLAX (test 1.13 cm                                



             code = 0666432955)                                        

 

             LV,s (test code = 2.94 cm                                



             6112374722)                                         

 

             LVOT Diam,S (test 1.98 cm                                



             code = 9824394177)                                        

 

             LV PEREZ VOL (test 66.04 ml                         



             code = 0331415799)                                        

 

             LV SYS VOL (test 33.32 ml     14-42                     



             code = 1118500970)                                        

 

             MV Peak E Adama (test 1.61 m/s                               



             code = 0210573504)                                        

 

             MV Peak A Adama (test 0.58 m/s                               



             code = 4771329696)                                        

 

             E/A ratio (test code              See_Comment  A             [Autom

ated



             = 6758239428)                                        message] The



                                                                 system which



                                                                 generated this



                                                                 result



                                                                 transmitted



                                                                 reference range

:



                                                                 <=0.8. The



                                                                 reference range



                                                                 was not used to



                                                                 interpret this



                                                                 result as



                                                                 normal/abnormal

.

 

             E wave decelartion              See_Comment  A             [Automat

ed



             time (test code =                                        message] T

he



             0785100756)                                         system which



                                                                 generated this



                                                                 result



                                                                 transmitted



                                                                 reference range

:



                                                                 200 msec. The



                                                                 reference range



                                                                 was not used to



                                                                 interpret this



                                                                 result as



                                                                 normal/abnormal

.

 

             LV,d (test code = 3.90 cm                                



             7310898426)                                         

 

             IVS/LVPW,2D (test                                        



             code = 0737062791)                                        

 

             LV EF,2D (test code 57.26 %                                



             = 1585124738)                                        

 

             LV FS Cube 2D (test                                        



             code = 7172068423)                                        

 

             LV FS Teich 2D (test                                        



             code = 3611957796)                                        

 

             LV SV Teich 2D (test 32.73 ml                               



             code = 8281876740)                                        

 

             LV Vol s Teich PSAX 33.32 ml                               



             (test code =                                        



             9504786490)                                         

 

             LVOT stroke volume 0.46 cm3                               



             (test code =                                        



             6247336250)                                         

 

             Left Atrium  5.30 cm      See_Comment  A             [Automated



             Dimension Anterior                                        message] 

The



             (test code =                                        system which



             3865945666)                                         generated this



                                                                 result



                                                                 transmitted



                                                                 reference range

:



                                                                 <=3.8. The



                                                                 reference range



                                                                 was not used to



                                                                 interpret this



                                                                 result as



                                                                 normal/abnormal

.

 

             LA Vol MOD A4C (test 130.09 ml                              



             code = 3109540385)                                        

 

             LA area s A4C (test 36.52 cm2                              



             code = 5996628895)                                        

 

             LVOT area (test code 3.08 cm2                               



             = 7868761667)                                        

 

             LVOT Vmax (test code 0.85 m/s                               



             = 0749334852)                                        

 

             AoV Mean PG (test              See_Comment                [Automate

d



             code = 4451271127)                                        message] 

The



                                                                 system which



                                                                 generated this



                                                                 result



                                                                 transmitted



                                                                 reference range

:



                                                                 20 mmHg. The



                                                                 reference range



                                                                 was not used to



                                                                 interpret this



                                                                 result as



                                                                 normal/abnormal

.

 

             AoV Peak PG (test              mmHg                      



             code = 9625400920)                                        

 

             AV LVOT peak              mmHg                      



             gradient (test code                                        



             = 5144140605)                                        

 

             AoV Area, Vmax (test 1.94 cm2     See_Comment                [Autom

ated



             code = 2680222992)                                        message] 

The



                                                                 system which



                                                                 generated this



                                                                 result



                                                                 transmitted



                                                                 reference range

:



                                                                 >=1.5. The



                                                                 reference range



                                                                 was not used to



                                                                 interpret this



                                                                 result as



                                                                 normal/abnormal

.

 

             LVOT VTI (CM) (test 15.00 cm                               



             code = 2552990581)                                        

 

             AoV Vmax (test code 1.35 m/s                               



             = 1931266299)                                        

 

             AoV Vmn (test code = 0.82 m/s                               



             1858841196)                                         

 

             AoV Area, VTI (test 2.30 cm2                               



             code = 5164854647)                                        

 

             LVOT CO (test code = 4.72 l/min                             



             8243329492)                                         

 

             LVOT HR for LVOT CO              bpm                       



             (test code =                                        



             8233513715)                                         

 

             Velocity Ratio 0.63 m/s                               



             (V1/V2) (test code =                                        



             4689)                                               

 

             MV mean gradient              See_Comment                [Automated



             (test code =                                        message] The



             6064615824)                                         system which



                                                                 generated this



                                                                 result



                                                                 transmitted



                                                                 reference range

:



                                                                 5 mmHg. The



                                                                 reference range



                                                                 was not used to



                                                                 interpret this



                                                                 result as



                                                                 normal/abnormal

.

 

             MR peak grad (test              mmHg                      



             code = 3026339301)                                        

 

             MV stenosis pressure 30.12 ms     See_Comment                [Autom

ated



             1/2 time (test code                                        message]

 The



             = 8581578088)                                        system which



                                                                 generated this



                                                                 result



                                                                 transmitted



                                                                 reference range

:



                                                                 <=150. The



                                                                 reference range



                                                                 was not used to



                                                                 interpret this



                                                                 result as



                                                                 normal/abnormal

.

 

             MV E A ratio (test                                        



             code = 3011438491)                                        

 

             MV valve area p 1/2 7.30 cm2                               



             method (test code =                                        



             9140176318)                                         

 

             MV VTI Tips (test 0.15 m                                 



             code = 1294075092)                                        

 

             MV Vmax (test code = 0.72 m                                 



             1922724792)                                         

 

             RVSP (test code =              See_Comment  A             [Automate

d



             5256205977)                                         message] The



                                                                 system which



                                                                 generated this



                                                                 result



                                                                 transmitted



                                                                 reference range

:



                                                                 40.00 mmHg. The



                                                                 reference range



                                                                 was not used to



                                                                 interpret this



                                                                 result as



                                                                 normal/abnormal

.

 

             TR pk grad (test              mmHg                      



             code = 7556433190)                                        

 

             TR Vpeak (test code 2.85 m/s                               



             = 1285070502)                                        

 

             RVSP (TR) (test code              mmHg                      



             = 3040080767)                                        

 

             RVOT Vmax (test code 0.44 m/s                               



             = 5339327310)                                        

 

             PV Mean Grad (test              mmHg                      



             code = 7204842760)                                        

 

             PV Pk Grad (test              See_Comment                [Automated



             code = 3343218284)                                        message] 

The



                                                                 system which



                                                                 generated this



                                                                 result



                                                                 transmitted



                                                                 reference range

:



                                                                 36 mmHg. The



                                                                 reference range



                                                                 was not used to



                                                                 interpret this



                                                                 result as



                                                                 normal/abnormal

.

 

             PV VTI (test code = 0.17 m                                 



             4360152789)                                         

 

             RVOT pk grad (test              mmHg                      



             code = 0998447203)                                        

 

             PV VMAX (test code = 1.05 m/s     See_Comment                [Autom

ated



             0271277877)                                         message] The



                                                                 system which



                                                                 generated this



                                                                 result



                                                                 transmitted



                                                                 reference range

:



                                                                 <=3. The



                                                                 reference range



                                                                 was not used to



                                                                 interpret this



                                                                 result as



                                                                 normal/abnormal

.

 

             PV Vmn (test code =                                        



             8244822647)                                         

 

             Ao Root Diameter 2.95 cm      See_Comment                [Automated



             (test code =                                        message] The



             9095319139)                                         system which



                                                                 generated this



                                                                 result



                                                                 transmitted



                                                                 reference range

:



                                                                 <=3.99. The



                                                                 reference range



                                                                 was not used to



                                                                 interpret this



                                                                 result as



                                                                 normal/abnormal

.

 

             Ao Root Diameter 2.95 cm                                



             (test code =                                        



             5346718495)                                         

 

             Ascending aorta 2.58 cm                                



             (test code =                                        



             6684497833)                                         

 

             Pred METS R1 (test                                        



             code = 7277116965)                                        

 

             Pred Exer Dur R1                                        



             (test code =                                        



             2484363404)                                         

 

             MV Decel slope (test 15.54 m/s2                             



             code = 6944628150)                                        

 

             LVPW pct thck PLAX 20.67 %                                



             (test code =                                        



             2455008198)                                         

 

             LV vol s cube 2D 25.42 ml                               



             (test code =                                        



             1661371208)                                         

 

             LV vol d cube 2D 59.47 ml                               



             (test code =                                        



             9993822853)                                         

 

             LV SV Cube 2D (test 34.05 ml                               



             code = 1680309033)                                        

 

             IVS pct thck PLAX 17.04 %                                



             (test code =                                        



             4872564949)                                         

 

             Calc MPHR (test code              bpm                       



             = 5821342845)                                        

 

             85 of MPHR (test                                        



             code = 3165111579)                                        

 

             RVOT VTI (test code 0.08 m                                 



             = 7569358766)                                        

 

             RVOT Vmn (test code 0.30 m/s                               



             = 5574089310)                                        

 

             RVOT mean grad (test              mmHg                      



             code = 2045507798)                                        

 

             MAX Pred HR (test                                        



             code = 9327072512)                                        

 

             LVOT mean grad (test              mmHg                      



             code = 3872874687)                                        

 

             Aov area Vmn (test 2.31 cm2                               



             code = 9641764205)                                        

 

             MV AE ratio (test                                        



             code = 6428544173)                                        

 

             LVOT Vmn (test code                                        



             = 7881549166)                                        

 

             MR Vmax (test code = 4.65 m/s                               



             9676484661)                                         

 

             AoV VTI (test code = 0.20 m                                 



             7892042504)                                         

 

             LVOT VTI (test code 0.15 m                                 



             = 9021655923)                                        

 

                          SUGEY (test code =          

                                                           



             SUGEY)          Left Ventricle:                           



                          Normal systolic                           



                          function with a                           



                          visually estimated                           



                          EF of 55 - 60%.                           



                          Grade III                              



                          (restrictive)                           



                          diastolic                              



                                                    dysfunction.

                                                           



                           Right Ventricle:                           



                          Right ventricle is                           



                          moderately dilated.                           



                          Mildly reduced                           



                                                    systolic function.

                                                           



                           Tricuspid Valve:                           



                          Moderate valvular                           



                          regurgitation. Left                           



                          VentricleLeft                           



                          ventricle size is                           



                          normal. Normal                           



                          systolic function                           



                          with a visually                           



                          estimated EF of 55 -                           



                          60%. Grade III                           



                          (restrictive)                           



                          diastolic                              



                          dysfunction.Right                           



                          VentricleRight                           



                          ventricle is                           



                          moderately dilated.                           



                          Mildly reduced                           



                          systolic                               



                          function.Left                           



                          AtriumLeft atrium is                           



                          severely                               



                          dilated.Right                           



                          AtriumRight atrium                           



                          is severely                            



                          dilated.Mitral                           



                          ValveMild mitral                           



                          annular                                



                          calcification. Mild                           



                          valvular                               



                          regurgitation.Tricus                           



                          pid ValveValve                           



                          structure is normal.                           



                          Moderate valvular                           



                          regurgitation.Aortic                           



                          ValveValve structure                           



                          is normal. No                           



                          significant valvular                           



                          regurgitation.Pulmon                           



                          ic ValveValve                           



                          structure is normal.                           



                          Mild valvular                           



                          regurgitation.Perica                           



                          rdiumThere is no                           



                          pericardial effusion                           



                          present.Study                           



                          DetailsStudy quality                           



                          was adequate. A                           



                          complete 2D, color                           



                          flow Doppler and                           



                          spectral Doppler                           



                          echocardiogram was                           



                          performed.The                           



                          apical, parasternal,                           



                          subcostal and                           



                          suprasternal views                           



                          were obtained.                           



                          Technical                              



                          difficulties due to                           



                          lung artifact.                           

 

             Lab Interpretation Abnormal                               



             (test code =                                        



             71993-9)                                            



[facility]w Cont[facility], Strain and
3D if needed)2022 14:28:07





             Test Item    Value        Reference    Interpretation Comments



                                       Range                     

 

             RA pressure (test              mmHg                      



             code = 9083643110)                                        

 

             EF (test code = 50 %         54-74        A            



             3966133058)                                         

 

             IVS,d (test code = 0.92 cm      0.6-0.9      A            



             4864336847)                                         

 

             IVS s 2D (test code 1.08 cm                                



             = 9085814582)                                        

 

             LVPWD,d (test code = 0.93 cm      0.60-1.19                 



             7164757542)                                         

 

             LVPW s PLAX (test 1.13 cm                                



             code = 4182691034)                                        

 

             LV,s (test code = 2.94 cm                                



             4273116659)                                         

 

             LVOT Diam,S (test 1.98 cm                                



             code = 0058502544)                                        

 

             LV PEREZ VOL (test 66.04 ml                         



             code = 7494382051)                                        

 

             LV SYS VOL (test 33.32 ml     14-42                     



             code = 3863501005)                                        

 

             MV Peak E Adama (test 1.61 m/s                               



             code = 8668371543)                                        

 

             MV Peak A Adama (test 0.58 m/s                               



             code = 0279574560)                                        

 

             E/A ratio (test code              See_Comment  A             [Autom

ated



             = 0263290189)                                        message] The



                                                                 system which



                                                                 generated this



                                                                 result



                                                                 transmitted



                                                                 reference range

:



                                                                 <=0.8. The



                                                                 reference range



                                                                 was not used to



                                                                 interpret this



                                                                 result as



                                                                 normal/abnormal

.

 

             E wave decelartion              See_Comment  A             [Automat

ed



             time (test code =                                        message] T

he



             0127020934)                                         system which



                                                                 generated this



                                                                 result



                                                                 transmitted



                                                                 reference range

:



                                                                 200 msec. The



                                                                 reference range



                                                                 was not used to



                                                                 interpret this



                                                                 result as



                                                                 normal/abnormal

.

 

             LV,d (test code = 3.90 cm                                



             6691199419)                                         

 

             IVS/LVPW,2D (test                                        



             code = 8276458264)                                        

 

             LV EF,2D (test code 57.26 %                                



             = 2648348555)                                        

 

             LV FS Cube 2D (test                                        



             code = 7766973932)                                        

 

             LV FS Teich 2D (test                                        



             code = 6529258882)                                        

 

             LV SV Teich 2D (test 32.73 ml                               



             code = 5920041247)                                        

 

             LV Vol s Teich PSAX 33.32 ml                               



             (test code =                                        



             2521873527)                                         

 

             LVOT stroke volume 0.46 cm3                               



             (test code =                                        



             4558454189)                                         

 

             Left Atrium  5.30 cm      See_Comment  A             [Automated



             Dimension Anterior                                        message] 

The



             (test code =                                        system which



             4510139458)                                         generated this



                                                                 result



                                                                 transmitted



                                                                 reference range

:



                                                                 <=3.8. The



                                                                 reference range



                                                                 was not used to



                                                                 interpret this



                                                                 result as



                                                                 normal/abnormal

.

 

             LA Vol MOD A4C (test 130.09 ml                              



             code = 6051431648)                                        

 

             LA area s A4C (test 36.52 cm2                              



             code = 2220512561)                                        

 

             LVOT area (test code 3.08 cm2                               



             = 7905288114)                                        

 

             LVOT Vmax (test code 0.85 m/s                               



             = 9952402724)                                        

 

             AoV Mean PG (test              See_Comment                [Automate

d



             code = 8682014537)                                        message] 

The



                                                                 system which



                                                                 generated this



                                                                 result



                                                                 transmitted



                                                                 reference range

:



                                                                 20 mmHg. The



                                                                 reference range



                                                                 was not used to



                                                                 interpret this



                                                                 result as



                                                                 normal/abnormal

.

 

             AoV Peak PG (test              mmHg                      



             code = 6566147253)                                        

 

             AV LVOT peak              mmHg                      



             gradient (test code                                        



             = 0724539959)                                        

 

             AoV Area, Vmax (test 1.94 cm2     See_Comment                [Autom

ated



             code = 5932219726)                                        message] 

The



                                                                 system which



                                                                 generated this



                                                                 result



                                                                 transmitted



                                                                 reference range

:



                                                                 >=1.5. The



                                                                 reference range



                                                                 was not used to



                                                                 interpret this



                                                                 result as



                                                                 normal/abnormal

.

 

             LVOT VTI (CM) (test 15.00 cm                               



             code = 0846339475)                                        

 

             AoV Vmax (test code 1.35 m/s                               



             = 1604628162)                                        

 

             AoV Vmn (test code = 0.82 m/s                               



             8064555850)                                         

 

             AoV Area, VTI (test 2.30 cm2                               



             code = 6456762064)                                        

 

             LVOT CO (test code = 4.72 l/min                             



             1226118264)                                         

 

             LVOT HR for LVOT CO              bpm                       



             (test code =                                        



             1957217271)                                         

 

             Velocity Ratio 0.63 m/s                               



             (V1/V2) (test code =                                        



             4689)                                               

 

             MV mean gradient              See_Comment                [Automated



             (test code =                                        message] The



             4215960888)                                         system which



                                                                 generated this



                                                                 result



                                                                 transmitted



                                                                 reference range

:



                                                                 5 mmHg. The



                                                                 reference range



                                                                 was not used to



                                                                 interpret this



                                                                 result as



                                                                 normal/abnormal

.

 

             MR peak grad (test              mmHg                      



             code = 7499900723)                                        

 

             MV stenosis pressure 30.12 ms     See_Comment                [Autom

ated



             1/2 time (test code                                        message]

 The



             = 3433460862)                                        system which



                                                                 generated this



                                                                 result



                                                                 transmitted



                                                                 reference range

:



                                                                 <=150. The



                                                                 reference range



                                                                 was not used to



                                                                 interpret this



                                                                 result as



                                                                 normal/abnormal

.

 

             MV E A ratio (test                                        



             code = 5443129292)                                        

 

             MV valve area p 1/2 7.30 cm2                               



             method (test code =                                        



             2901015616)                                         

 

             MV VTI Tips (test 0.15 m                                 



             code = 1715927244)                                        

 

             MV Vmax (test code = 0.72 m                                 



             6904966352)                                         

 

             RVSP (test code =              See_Comment  A             [Automate

d



             5797757834)                                         message] The



                                                                 system which



                                                                 generated this



                                                                 result



                                                                 transmitted



                                                                 reference range

:



                                                                 40.00 mmHg. The



                                                                 reference range



                                                                 was not used to



                                                                 interpret this



                                                                 result as



                                                                 normal/abnormal

.

 

             TR pk grad (test              mmHg                      



             code = 9835950365)                                        

 

             TR Vpeak (test code 2.85 m/s                               



             = 5025484922)                                        

 

             RVSP (TR) (test code              mmHg                      



             = 0046999225)                                        

 

             RVOT Vmax (test code 0.44 m/s                               



             = 9268769679)                                        

 

             PV Mean Grad (test              mmHg                      



             code = 0869754967)                                        

 

             PV Pk Grad (test              See_Comment                [Automated



             code = 7035320141)                                        message] 

The



                                                                 system which



                                                                 generated this



                                                                 result



                                                                 transmitted



                                                                 reference range

:



                                                                 36 mmHg. The



                                                                 reference range



                                                                 was not used to



                                                                 interpret this



                                                                 result as



                                                                 normal/abnormal

.

 

             PV VTI (test code = 0.17 m                                 



             0383427534)                                         

 

             RVOT pk grad (test              mmHg                      



             code = 6285805033)                                        

 

             PV VMAX (test code = 1.05 m/s     See_Comment                [Autom

ated



             8433618703)                                         message] The



                                                                 system which



                                                                 generated this



                                                                 result



                                                                 transmitted



                                                                 reference range

:



                                                                 <=3. The



                                                                 reference range



                                                                 was not used to



                                                                 interpret this



                                                                 result as



                                                                 normal/abnormal

.

 

             PV Vmn (test code =                                        



             6831528705)                                         

 

             Ao Root Diameter 2.95 cm      See_Comment                [Automated



             (test code =                                        message] The



             8146940429)                                         system which



                                                                 generated this



                                                                 result



                                                                 transmitted



                                                                 reference range

:



                                                                 <=3.99. The



                                                                 reference range



                                                                 was not used to



                                                                 interpret this



                                                                 result as



                                                                 normal/abnormal

.

 

             Ao Root Diameter 2.95 cm                                



             (test code =                                        



             4314957817)                                         

 

             Ascending aorta 2.58 cm                                



             (test code =                                        



             1801376005)                                         

 

             Pred METS R1 (test                                        



             code = 1106941968)                                        

 

             Pred Exer Dur R1                                        



             (test code =                                        



             2024253249)                                         

 

             MV Decel slope (test 15.54 m/s2                             



             code = 7886681918)                                        

 

             LVPW pct thck PLAX 20.67 %                                



             (test code =                                        



             0253115268)                                         

 

             LV vol s cube 2D 25.42 ml                               



             (test code =                                        



             6883396446)                                         

 

             LV vol d cube 2D 59.47 ml                               



             (test code =                                        



             3227218110)                                         

 

             LV SV Cube 2D (test 34.05 ml                               



             code = 4715886838)                                        

 

             IVS pct thck PLAX 17.04 %                                



             (test code =                                        



             8456780419)                                         

 

             Calc MPHR (test code              bpm                       



             = 8839435019)                                        

 

             85 of MPHR (test                                        



             code = 3454950684)                                        

 

             RVOT VTI (test code 0.08 m                                 



             = 0067476642)                                        

 

             RVOT Vmn (test code 0.30 m/s                               



             = 6215316321)                                        

 

             RVOT mean grad (test              mmHg                      



             code = 0087668867)                                        

 

             MAX Pred HR (test                                        



             code = 7366846220)                                        

 

             LVOT mean grad (test              mmHg                      



             code = 1683573077)                                        

 

             Aov area Vmn (test 2.31 cm2                               



             code = 7292093887)                                        

 

             MV AE ratio (test                                        



             code = 4102171756)                                        

 

             LVOT Vmn (test code                                        



             = 5596811568)                                        

 

             MR Vmax (test code = 4.65 m/s                               



             6455085473)                                         

 

             AoV VTI (test code = 0.20 m                                 



             9927471835)                                         

 

             LVOT VTI (test code 0.15 m                                 



             = 0208100705)                                        

 

                          SUGEY (test code =          

                                                           



             SUGEY)          Left Ventricle:                           



                          Normal systolic                           



                          function with a                           



                          visually estimated                           



                          EF of 55 - 60%.                           



                          Grade III                              



                          (restrictive)                           



                          diastolic                              



                                                    dysfunction.

                                                           



                           Right Ventricle:                           



                          Right ventricle is                           



                          moderately dilated.                           



                          Mildly reduced                           



                                                    systolic function.

                                                           



                           Tricuspid Valve:                           



                          Moderate valvular                           



                          regurgitation. Left                           



                          VentricleLeft                           



                          ventricle size is                           



                          normal. Normal                           



                          systolic function                           



                          with a visually                           



                          estimated EF of 55 -                           



                          60%. Grade III                           



                          (restrictive)                           



                          diastolic                              



                          dysfunction.Right                           



                          VentricleRight                           



                          ventricle is                           



                          moderately dilated.                           



                          Mildly reduced                           



                          systolic                               



                          function.Left                           



                          AtriumLeft atrium is                           



                          severely                               



                          dilated.Right                           



                          AtriumRight atrium                           



                          is severely                            



                          dilated.Mitral                           



                          ValveMild mitral                           



                          annular                                



                          calcification. Mild                           



                          valvular                               



                          regurgitation.Tricus                           



                          pid ValveValve                           



                          structure is normal.                           



                          Moderate valvular                           



                          regurgitation.Aortic                           



                          ValveValve structure                           



                          is normal. No                           



                          significant valvular                           



                          regurgitation.Pulmon                           



                          ic ValveValve                           



                          structure is normal.                           



                          Mild valvular                           



                          regurgitation.Perica                           



                          rdiumThere is no                           



                          pericardial effusion                           



                          present.Study                           



                          DetailsStudy quality                           



                          was adequate. A                           



                          complete 2D, color                           



                          flow Doppler and                           



                          spectral Doppler                           



                          echocardiogram was                           



                          performed.The                           



                          apical, parasternal,                           



                          subcostal and                           



                          suprasternal views                           



                          were obtained.                           



                          Technical                              



                          difficulties due to                           



                          lung artifact.                           

 

             Lab Interpretation Abnormal                               



             (test code =                                        



             48297-1)                                            



Janeen ChandARS-CoV-2 (COVID-19) RNA [Presence] in Respiratory specimen 
by EDWARD with probe [id number] 19:34:22





             Test Item    Value        Reference Range Interpretation Comments

 

             SARS-CoV-2 (COVID-19) RNA [Presence] Detected                      

         



             in Respiratory specimen by EDWARD with                                

        



             probe detection (test code =                                       

 



             92745-4)                                            

 

             Whether patient is employed in a Unknown                           

     



             healthcare setting (test code =                                    

    



             66902-8)                                            

 

             Whether the patient has symptoms Unknown                           

     



             related to condition of interest                                   

     



             (test code = 28288-2)                                        

 

             Whether the patient was hospitalized Unknown                       

         



             for condition of interest (test code                               

         



             = 37767-3)                                          

 

             Whether the patient was admitted to Unknown                        

        



             intensive care unit (ICU) for                                      

  



             condition of interest (test code =                                 

       



             96666-2)                                            

 

             Whether patient resides in a Unknown                               

 



             congregate care setting (test code =                               

         



             51980-1)                                            

 

             Pregnancy status (test code = Unknown                              

  



             43677-1)                                            

 

             Date and time of symptom onset (test Unknown                       

         



             code = 28015-6)                                        



[facility] METABOLIC [id number] 16:39:00





             Test Item    Value        Reference Range Interpretation Comments

 

             SODIUM (test code = NA) 138 mEq/L    134-147      N            

 

             POTASSIUM (test code = 3.7 mEq/L    3.4-5.0      N            



             K)                                                  

 

             CHLORIDE (test code = 100 mEq/L    100-108      N            



             CL)                                                 

 

             CARBON DIOXIDE (test 28 mEq/l     21-33        N            



             code = CO2)                                         

 

             ANION GAP (test code = 13           0-20         N            



             GAP)                                                

 

             GLUCOSE (test code = 77 mg/dL            N            



             GLU)                                                

 

             BLOOD UREA NITROGEN 13 mg/dL     7-18         N            



             (test code = BUN)                                        

 

             GLOMERULAR FILTRATION 11.6         80-90        L            Units 

of measure =



             RATE (test code = GFR)                                        ml/mi

n/1.73 m2

 

             CREATININE (test code = 3.9 mg/dL    0.6-1.3      H            



             CREAT)                                              

 

             CALCIUM (test code = 8.8 mg/dL    8.0-10.5     N            



             CA)                                                 



PROTHROMBIN [id number] 16:33:00





             Test Item    Value        Reference Range Interpretation Comments

 

             PROTHROMBIN TIME 13.1 SECONDS 9.3-12.9     H            



             PATIENT (test code =                                        



             PTP)                                                

 

             INTERNATIONAL NORMAL 1.2          0.8-1.2      N             TARGET

 INR BY



             RATIO (test code =                                        INDICATIO

N Indication



             INR)                                                INR1. Prophylax

is of



                                                                 venous thrombos

is 2.0



                                                                 - 3.0 (orthoped

ic



                                                                 surgery), Proph

ylaxis



                                                                 of venous throm

bosis



                                                                 (other than hig

h-risk



                                                                 surgery), Treat

ment of



                                                                 Deep Vein



                                                                 Thrombosis/Pulm

onary



                                                                 Embolism, Preve

ntion



                                                                 of systemic emb

olism -



                                                                 Tissue heart va

lves,



                                                                 Acute Myocardia

l



                                                                 Infarction (to 

prevent



                                                                 systemic emboli

sm),



                                                                 Valvular heart



                                                                 disease, Atrial



                                                                 Fibrillation,



                                                                 Bileaflet mecha

nical



                                                                 valve in aortic



                                                                 position.2. Mec

hanical



                                                                 prosthetic valv

es



                                                                 (high risk), 2.

5 - 3.5



                                                                 Presence of Lup

us



                                                                 Anticoagulant o

r



                                                                 Antiphospholipi

d



                                                                 Antibodies, Pre

vention



                                                                 of systemic emb

olism -



                                                                 Acute Myocardia

l



                                                                 Infarction (to 

prevent



                                                                 recurrent infar

ct).



CBC W/AUTO [id number] 16:23:00





             Test Item    Value        Reference Range Interpretation Comments

 

             WHITE BLOOD CELL (test code = 9.2 x10 3/uL 4.5-11.0     N          

  



             WBC)                                                

 

             RED BLOOD CELL (test code = 3.17 x10 6/uL 3.54-5.02    L           

 



             RBC)                                                

 

             HEMOGLOBIN (test code = HGB) 10.3 g/dL    11.0-15.0    L           

 

 

             HEMATOCRIT (test code = HCT) 33.2 %       33.0-45.0    N           

 

 

             MEAN CELL VOLUME (test code = 104.7 fL     81.0-99.0    H          

  



             MCV)                                                

 

             MEAN CELL HGB (test code = MCH) 32.5 pg      27.0-33.0    N        

    

 

             MEAN CELL HGB CONCETRATION 31.0 g/dL    33.0-37.0    L            



             (test code = MCHC)                                        

 

             RED CELL DISTRIBUTION WIDTH CV 14.5 %       11.5-14.5    N         

   



             (test code = RDW)                                        

 

             RED CELL DISTRIBUTION WIDTH SD 56.6 fL      37.0-54.0    H         

   



             (test code = RDW-SD)                                        

 

             PLATELET COUNT (test code = 108 x10 3/uL 150-400      L            



             PLT)                                                

 

             MEAN PLATELET VOLUME (test code 10.2 fL      7.0-9.0      H        

    



             = MPV)                                              

 

             NEUTROPHIL % (test code = NT%) 80.2 %       56.0-77.0    H         

   

 

             IMMATURE GRANULOCYTE % (test 1.4 %        0.0-2.0      N           

 



             code = IG%)                                         

 

             LYMPHOCYTE % (test code = LY%) 11.0 %       14.0-32.0    L         

   

 

             MONOCYTE % (test code = MO%) 5.2 %        4.8-9.0      N           

 

 

             EOSINOPHIL % (test code = EO%) 1.5 %        0.3-3.7      N         

   

 

             BASOPHIL % (test code = BA%) 0.7 %        0.0-2.0      N           

 

 

             NUCLEATED RBC % (test code = 0.0 %        0-0          N           

 



             NRBC%)                                              

 

             NEUTROPHIL # (test code = NT#) 7.34 x10 3/uL 2.0-7.6      N        

    

 

             IMMATURE GRANULOCYTE # (test 0.13 x10 3/uL 0.00-0.03    H          

  



             code = IG#)                                         

 

             LYMPHOCYTE # (test code = LY#) 1.01 x10 3/uL 1.0-3.8      N        

    

 

             MONOCYTE # (test code = MO#) 0.48 x10 3/uL 0.1-0.8      N          

  

 

             EOSINOPHIL # (test code = EO#) 0.14 x10 3/uL 0.0-0.2      N        

    

 

             BASOPHIL # (test code = BA#) 0.06 x10 3/uL 0.0-0.2      N          

  

 

             NUCLEATED RBC # (test code = 0.00 x10 3/uL 0.0-0.1      N          

  



             NRBC#)                                              

 

             MANUAL DIFF REQUIRED (test code NO                                 

    



             = MDIFF)                                            



Hepatitis B surface [id number] 21:36:32





             Test Item    Value        Reference Range Interpretation Comments

 

             Hep B S Ab (test code <8.0         See_Comment                [Auto

mated



             = 17640-7)                                          message] The



                                                                 system which



                                                                 generated this



                                                                 result transmit

[place]



                                                                 reference range

:



                                                                 <8.0 mIU/mL. Th

e



                                                                 reference range



                                                                 was not used to



                                                                 interpret this



                                                                 result as



                                                                 normal/abnormal

.

 

             SUGEY (test code = SUGEY)  ID -                           



                          DB                                     

 

             Lab Interpretation Normal                                 



             (test code = 61125-2)                                        



[facility] B surface [id number] 21:36:32





             Test Item    Value        Reference Range Interpretation Comments

 

             Hep B S Ab (test code <8.0         See_Comment                [Auto

mated



             = 94596-8)                                          message] The



                                                                 system which



                                                                 generated this



                                                                 result transmit

[place]



                                                                 reference range

:



                                                                 <8.0 mIU/mL. Th

e



                                                                 reference range



                                                                 was not used to



                                                                 interpret this



                                                                 result as



                                                                 normal/abnormal

.

 

             SUGEY (test code = SUGEY)  ID -                           



                          DB                                     

 

             Lab Interpretation Normal                                 



             (test code = 85136-8)                                        



[id number]32





             Test Item    Value        Reference Range Interpretation Comments

 

             Hep B S Ab (test code <8.0         See_Comment                [Auto

mated



             = 54438-0)                                          message] The



                                                                 system which



                                                                 generated this



                                                                 result transmit

[place]



                                                                 reference range

:



                                                                 <8.0 mIU/mL. Th

e



                                                                 reference range



                                                                 was not used to



                                                                 interpret this



                                                                 result as



                                                                 normal/abnormal

.

 

             SUGEY (test code = SUGEY)  ID -                           



                          DB                                     

 

             Lab Interpretation Normal                                 



             (test code = 63149-8)                                        



[facility] [id number] [facility]36[facility]32





             Test Item    Value        Reference Range Interpretation Comments

 

             Hep B S Ab (test code <8.0         See_Comment                [Auto

mated



             = 06509-8)                                          message] The



                                                                 system which



                                                                 generated this



                                                                 result transmit

kolby



                                                                 reference range

:



                                                                 <8.0 mIU/mL. Th

e



                                                                 reference range



                                                                 was not used to



                                                                 interpret this



                                                                 result as



                                                                 normal/abnormal

.

 

             SUGEY (test code = SUGEY)  ID -                           



                          DB                                     

 

             Lab Interpretation Normal                                 



             (test code = 01760-7)                                        



[facility] B surface [id number] 21:36:32





             Test Item    Value        Reference Range Interpretation Comments

 

             Hep B S Ab (test code <8.0         See_Comment                [Auto

mated



             = 84143-5)                                          message] The



                                                                 system which



                                                                 generated this



                                                                 result transmit

kolby



                                                                 reference range

:



                                                                 <8.0 mIU/mL. Th

e



                                                                 reference range



                                                                 was not used to



                                                                 interpret this



                                                                 result as



                                                                 normal/abnormal

.

 

             SUGEY (test code = SUGEY)  ID -                           



                          DB                                     

 

             Lab Interpretation Normal                                 



             (test code = 67107-5)                                        



[facility] B surface [id number] 21:36:32





             Test Item    Value        Reference Range Interpretation Comments

 

             Hep B S Ab (test code <8.0         See_Comment                [Auto

mated



             = 61710-4)                                          message] The



                                                                 system which



                                                                 generated this



                                                                 result transmit

kolby



                                                                 reference range

:



                                                                 <8.0 mIU/mL. Th

e



                                                                 reference range



                                                                 was not used to



                                                                 interpret this



                                                                 result as



                                                                 normal/abnormal

.

 

             SUGEY (test code = SUGEY)  ID -                           



                          DB                                     

 

             Lab Interpretation Normal                                 



             (test code = 73800-0)                                        



[facility] B surface [id number] 21:36:32





             Test Item    Value        Reference Range Interpretation Comments

 

             Hep B S Ab (test code <8.0         See_Comment                [Auto

mated



             = 37367-5)                                          message] The



                                                                 system which



                                                                 generated this



                                                                 result transmit

kolby



                                                                 reference range

:



                                                                 <8.0 mIU/mL. Th

e



                                                                 reference range



                                                                 was not used to



                                                                 interpret this



                                                                 result as



                                                                 normal/abnormal

.

 

             SUGEY (test code = SUGEY)  ID -                           



                          DB                                     

 

             Lab Interpretation Normal                                 



             (test code = 36111-9)                                        



[facility] B SURFACE [id number] 21:36:32





             Test Item    Value        Reference Range Interpretation Comments

 

             HEPATITIS B SURFACE ANTIBODY < mIU/mL     <8.0                     

 



             (BEAKER) (test code = 647)                                        



 [facility] [id number] 21:24:22





             Test Item    Value        Reference Range Interpretation Comments

 

             Hepatitis B surface Nonreactive  Nonreactive               



             antigen (test code =                                        



             5195-3)                                             

 

             SUGEY (test code = SUGEY) Specimen is                            



                          considered negative                           



                          for HBsAg.                             

 

             Lab Interpretation (test Normal                                 



             code = 45800-7)                                        



[facility] [id number] 21:24:22





             Test Item    Value        Reference Range Interpretation Comments

 

             Hepatitis B surface Nonreactive  Nonreactive               



             antigen (test code =                                        



             5195-3)                                             

 

             SUGEY (test code = SUGEY) Specimen is                            



                          considered negative                           



                          for HBsAg.                             

 

             Lab Interpretation (test Normal                                 



             code = 48494-5)                                        



[facility] B SURFACE [id number] 21:24:22





             Test Item    Value        Reference Range Interpretation Comments

 

             HEPATITIS B SURFACE ANTIGEN (2) Nonreactive  Nonreactive           

    



             (BEAKER) (test code = 2585)                                        



Specimen is considered negative for HBsAg.Transthoracic Echocardiogram Complete,
(w Contrast, Strain and 3D if needed)2022 13:23:07





             Test Item    Value        Reference    Interpretation Comments



                                       Range                     

 

             EF (test code = 66 %         54-74                     



             [id number])                                         

 

             IVS,d (test code = 1.03 cm      0.6-0.9      A            



             5175571181)                                         

 

             LVPWD,d (test code = 1.27 cm      0.60-1.19    A            



             9726823514)                                         

 

             LV,s (test code = 2.56 cm                                



             9347663747)                                         

 

             LVOT Diam,S (test 2.01 cm                                



             code = 3799999049)                                        

 

             LV PEREZ VOL (test 69.20 ml                         



             code = 4079434059)                                        

 

             LV SYS VOL (test 23.74 ml     14-42                     



             code = 7735748782)                                        

 

             MV Peak E Adama (test 1.20 m/s                               



             code = 5176701556)                                        

 

             MV Peak A Adama (test 0.47 m/s                               



             code = 1830911604)                                        

 

             E/A ratio (test code              See_Comment  A             [Autom

ated



             = 2396530755)                                        message] The



                                                                 system which



                                                                 generated this



                                                                 result



                                                                 transmitted



                                                                 reference range

:



                                                                 <=0.8. The



                                                                 reference range



                                                                 was not used to



                                                                 interpret this



                                                                 result as



                                                                 normal/abnormal

.

 

             E wave decelartion              See_Comment  A             [Automat

ed



             time (test code =                                        message] T

he



             8899281468)                                         system which



                                                                 generated this



                                                                 result



                                                                 transmitted



                                                                 reference range

:



                                                                 200 msec. The



                                                                 reference range



                                                                 was not used to



                                                                 interpret this



                                                                 result as



                                                                 normal/abnormal

.

 

             LV,d (test code = 3.98 cm                                



             7527616207)                                         

 

             IVS/LVPW,2D (test                                        



             code = 1096655445)                                        

 

             LV EF,2D (test code 73.32 %                                



             = 5344928013)                                        

 

             LV FS Cube 2D (test                                        



             code = 2409281920)                                        

 

             LV FS Teich 2D (test                                        



             code = 3090416564)                                        

 

             LV SV Teich 2D (test 45.46 ml                               



             code = 6924950010)                                        

 

             LV Vol s Teich PSAX 23.74 ml                               



             (test code =                                        



             4774800037)                                         

 

             LVOT stroke volume 0.35 cm3                               



             (test code =                                        



             6553337893)                                         

 

             Left Atrium  4.52 cm      See_Comment  A             [Automated



             Dimension Anterior                                        message] 

The



             (test code =                                        system which



             2317922971)                                         generated this



                                                                 result



                                                                 transmitted



                                                                 reference range

:



                                                                 <=3.8. The



                                                                 reference range



                                                                 was not used to



                                                                 interpret this



                                                                 result as



                                                                 normal/abnormal

.

 

             LA Vol MOD A4C (test 83.62 ml                               



             code = 8250877409)                                        

 

             LA area s A4C (test 28.59 cm2                              



             code = 2674869094)                                        

 

             LA Ao Ratio Mmode                                        



             (test code =                                        



             9744028484)                                         

 

             LVOT area (test code 3.17 cm2                               



             = 9822221619)                                        

 

             LVOT Vmax (test code 0.62 m/s                               



             = 2495963819)                                        

 

             AoV Mean PG (test              See_Comment                [Automate

d



             code = 7277598594)                                        message] 

The



                                                                 system which



                                                                 generated this



                                                                 result



                                                                 transmitted



                                                                 reference range

:



                                                                 20 mmHg. The



                                                                 reference range



                                                                 was not used to



                                                                 interpret this



                                                                 result as



                                                                 normal/abnormal

.

 

             AoV Peak PG (test              mmHg                      



             code = 1778366492)                                        

 

             AV LVOT peak              mmHg                      



             gradient (test code                                        



             = 7934046525)                                        

 

             AoV Area, Vmax (test 2.00 cm2     See_Comment                [Autom

ated



             code = 0425103885)                                        message] 

The



                                                                 system which



                                                                 generated this



                                                                 result



                                                                 transmitted



                                                                 reference range

:



                                                                 >=1.5. The



                                                                 reference range



                                                                 was not used to



                                                                 interpret this



                                                                 result as



                                                                 normal/abnormal

.

 

             LVOT VTI (CM) (test 11.00 cm                               



             code = 5439935185)                                        

 

             AoV Vmax (test code 0.98 m/s                               



             = 6883573163)                                        

 

             AoV Vmn (test code = 0.65 m/s                               



             6730459265)                                         

 

             AoV Area, VTI (test 2.42 cm2                               



             code = 1154152724)                                        

 

             Velocity Ratio 0.63 m/s                               



             (V1/V2) (test code =                                        



             4689)                                               

 

             MR peak grad (test              mmHg                      



             code = 5675575328)                                        

 

             MV stenosis pressure 31.00 ms     See_Comment                [Autom

ated



             1/2 time (test code                                        message]

 The



             = 2887821435)                                        system which



                                                                 generated this



                                                                 result



                                                                 transmitted



                                                                 reference range

:



                                                                 <=150. The



                                                                 reference range



                                                                 was not used to



                                                                 interpret this



                                                                 result as



                                                                 normal/abnormal

.

 

             MV E A ratio (test                                        



             code = 2274798116)                                        

 

             MV valve area p 1/2 7.10 cm2                               



             method (test code =                                        



             8321954452)                                         

 

             E prime lat (test                                        



             code = 1804699654)                                        

 

             E prine sept (test                                        



             code = 6475596804)                                        

 

             RVSP (test code =              See_Comment  A             [Automate

d



             9991137930)                                         message] The



                                                                 system which



                                                                 generated this



                                                                 result



                                                                 transmitted



                                                                 reference range

:



                                                                 40.00 mmHg. The



                                                                 reference range



                                                                 was not used to



                                                                 interpret this



                                                                 result as



                                                                 normal/abnormal

.

 

             RA pressure (test              mmHg                      



             code = 7203423291)                                        

 

             TR pk grad (test              mmHg                      



             code = 9761981365)                                        

 

             TR Vpeak (test code 3.51 m/s                               



             = 8425782910)                                        

 

             RVSP (TR) (test code              mmHg                      



             = 7436978653)                                        

 

             RVOT Vmax (test code 0.46 m/s                               



             = 4155010946)                                        

 

             PV Pk Grad (test              See_Comment                [Automated



             code = 2870666661)                                        message] 

The



                                                                 system which



                                                                 generated this



                                                                 result



                                                                 transmitted



                                                                 reference range

:



                                                                 36 mmHg. The



                                                                 reference range



                                                                 was not used to



                                                                 interpret this



                                                                 result as



                                                                 normal/abnormal

.

 

             RVOT pk grad (test              mmHg                      



             code = 8514744677)                                        

 

             PV VMAX (test code = 1.15 m/s     See_Comment                [Autom

ated



             5418259939)                                         message] The



                                                                 system which



                                                                 generated this



                                                                 result



                                                                 transmitted



                                                                 reference range

:



                                                                 <=3. The



                                                                 reference range



                                                                 was not used to



                                                                 interpret this



                                                                 result as



                                                                 normal/abnormal

.

 

             Ao root annulus 2.82 cm                                



             (test code =                                        



             7245507323)                                         

 

             Ao Root Diameter 3.23 cm      See_Comment                [Automated



             (test code =                                        message] The



             [id number])                                         system which



                                                                 generated this



                                                                 result



                                                                 transmitted



                                                                 reference range

:



                                                                 <=3.99. The



                                                                 reference range



                                                                 was not used to



                                                                 interpret this



                                                                 result as



                                                                 normal/abnormal

.

 

             Ao Root Diameter 3.23 cm                                



             (test code =                                        



             5095960008)                                         

 

             Ascending aorta 3.69 cm                                



             (test code =                                        



             5725347402)                                         

 

             Pred METS R1 (test                                        



             code = 2091715019)                                        

 

             Pred Exer Dur R1                                        



             (test code =                                        



             3449860768)                                         

 

             MV Decel slope (test 11.26 m/s2                             



             code = 5579247898)                                        

 

             LV vol s cube 2D 16.83 ml                               



             (test code =                                        



             2660106322)                                         

 

             LV vol d cube 2D 63.08 ml                               



             (test code =                                        



             4801639905)                                         

 

             LV SV Cube 2D (test 46.25 ml                               



             code = 5702750150)                                        

 

             Calc MPHR (test code              bpm                       



             = 2035691675)                                        

 

             85 of MPHR (test                                        



             code = 8286198421)                                        

 

             MAX Pred HR (test                                        



             code = 7484469312)                                        

 

             LVOT mean grad (test              mmHg                      



             code = 8110629487)                                        

 

             Aov area Vmn (test 1.92 cm2                               



             code = 5268813348)                                        

 

             MV AE ratio (test                                        



             code = 8629609961)                                        

 

             LVOT Vmn (test code                                        



             = 9102102442)                                        

 

             MR Vmax (test code = 4.22 m/s                               



             5382082583)                                         

 

             AoV VTI (test code = 0.15 m                                 



             9385612485)                                         

 

             LVOT VTI (test code 0.11 m                                 



             = 7976714970)                                        

 

                          SUGEY (test code =          

                                                           



             SUGEY)          Left Ventricle:                           



                          Normal systolic                           



                          function with a                           



                          visually estimated                           



                          EF of 65 - 70%.                           



                          Grade I (impaired                           



                          relaxation)                            



                          diastolic                              



                                                    dysfunction.

                                                           



                           Left Atrium: Left                           



                          atrium is mildly                           



                                                    dilated.

                                                           



                           Right Ventricle:                           



                          Right ventricle is                           



                          moderately dilated.                           



                          Reduced systolic                           



                                                    function.

                                                           



                           Right Atrium: Right                           



                          atrium is moderately                           



                                                    dilated.

                                                           



                           Mitral Valve: Valve                           



                          structure is normal.                           



                          Mildly calcified                           



                          leaflets. Mild                           



                          valvular                               



                                                    regurgitation.

                                                           



                           Tricuspid Valve:                           



                          Moderate valvular                           



                          regurgitation.                           



                          Moderately elevated                           



                          pulmonary artery                           



                          systolic pressure.                           



                          RVSP is 59.82 mmHg.                           



                          Left VentricleLeft                           



                          ventricle size is                           



                          normal. Normal                           



                          systolic function                           



                          with a visually                           



                          estimated EF of 65 -                           



                          70%. Grade I                           



                          (impaired                              



                          relaxation)                            



                          diastolic                              



                          dysfunction.Right                           



                          VentricleRight                           



                          ventricle is                           



                          moderately dilated.                           



                          Reduced systolic                           



                          function.Left                           



                          AtriumLeft atrium is                           



                          mildly dilated.Right                           



                          AtriumRight atrium                           



                          is moderately                           



                          dilated.Mitral                           



                          ValveValve structure                           



                          is normal. Mildly                           



                          calcified leaflets.                           



                          Mild valvular                           



                          regurgitation.Tricus                           



                          pid ValveValve                           



                          structure is normal.                           



                          Moderate valvular                           



                          regurgitation.                           



                          Moderately elevated                           



                          pulmonary artery                           



                          systolic pressure.                           



                          RVSP is 59.82                           



                          mmHg.Aortic                            



                          ValveValve structure                           



                          appears tricuspid.                           



                          No significant                           



                          valvular                               



                          regurgitation. No                           



                          stenosis.Pulmonic                           



                          ValveValve structure                           



                          is                                     



                          normal.PericardiumTh                           



                          ere is no                              



                          pericardial effusion                           



                          present.Study                           



                          DetailsStudy quality                           



                          was good. A complete                           



                          2D, color flow                           



                          Doppler and spectral                           



                          Doppler                                



                          echocardiogram was                           



                          performed.The                           



                          apical, parasternal,                           



                          subcostal and                           



                          suprasternal views                           



                          were obtained.                           



                          Patient exhibited                           



                          sinus tachycardia.                           

 

             Lab Interpretation Abnormal                               



             (test code =                                        



             24063-1)                                            



[facility], Strain and
3D if needed)2022 13:23:07





             Test Item    Value        Reference    Interpretation Comments



                                       Range                     

 

             EF (test code = 66 %         54-74                     



             3057847343)                                         

 

             IVS,d (test code = 1.03 cm      0.6-0.9      A            



             1006426014)                                         

 

             LVPWD,d (test code = 1.27 cm      0.60-1.19    A            



             1583581929)                                         

 

             LV,s (test code = 2.56 cm                                



             0213311475)                                         

 

             LVOT Diam,S (test 2.01 cm                                



             code = 7281169635)                                        

 

             LV PEREZ VOL (test 69.20 ml                         



             code = 9933729244)                                        

 

             LV SYS VOL (test 23.74 ml     14-42                     



             code = 1467886772)                                        

 

             MV Peak E Adama (test 1.20 m/s                               



             code = 7312116936)                                        

 

             MV Peak A Adama (test 0.47 m/s                               



             code = 2067087018)                                        

 

             E/A ratio (test code              See_Comment  A             [Autom

ated



             = 8142251260)                                        message] The



                                                                 system which



                                                                 generated this



                                                                 result



                                                                 transmitted



                                                                 reference range

:



                                                                 <=0.8. The



                                                                 reference range



                                                                 was not used to



                                                                 interpret this



                                                                 result as



                                                                 normal/abnormal

.

 

             E wave decelartion              See_Comment  A             [Automat

ed



             time (test code =                                        message] T

he



             1615279128)                                         system which



                                                                 generated this



                                                                 result



                                                                 transmitted



                                                                 reference range

:



                                                                 200 msec. The



                                                                 reference range



                                                                 was not used to



                                                                 interpret this



                                                                 result as



                                                                 normal/abnormal

.

 

             LV,d (test code = 3.98 cm                                



             8263602610)                                         

 

             IVS/LVPW,2D (test                                        



             code = 4551903313)                                        

 

             LV EF,2D (test code 73.32 %                                



             = 7500062590)                                        

 

             LV FS Cube 2D (test                                        



             code = 3980726235)                                        

 

             LV FS Teich 2D (test                                        



             code = 7911687753)                                        

 

             LV SV Teich 2D (test 45.46 ml                               



             code = 7444513144)                                        

 

             LV Vol s Teich PSAX 23.74 ml                               



             (test code =                                        



             6485502836)                                         

 

             LVOT stroke volume 0.35 cm3                               



             (test code =                                        



             0626856414)                                         

 

             Left Atrium  4.52 cm      See_Comment  A             [Automated



             Dimension Anterior                                        message] 

The



             (test code =                                        system which



             6531797237)                                         generated this



                                                                 result



                                                                 transmitted



                                                                 reference range

:



                                                                 <=3.8. The



                                                                 reference range



                                                                 was not used to



                                                                 interpret this



                                                                 result as



                                                                 normal/abnormal

.

 

             LA Vol MOD A4C (test 83.62 ml                               



             code = 4310866110)                                        

 

             LA area s A4C (test 28.59 cm2                              



             code = 8823878219)                                        

 

             LA Ao Ratio Mmode                                        



             (test code =                                        



             4897590482)                                         

 

             LVOT area (test code 3.17 cm2                               



             = 8122541907)                                        

 

             LVOT Vmax (test code 0.62 m/s                               



             = 5063429969)                                        

 

             AoV Mean PG (test              See_Comment                [Automate

d



             code = 4881872969)                                        message] 

The



                                                                 system which



                                                                 generated this



                                                                 result



                                                                 transmitted



                                                                 reference range

:



                                                                 20 mmHg. The



                                                                 reference range



                                                                 was not used to



                                                                 interpret this



                                                                 result as



                                                                 normal/abnormal

.

 

             AoV Peak PG (test              mmHg                      



             code = 6269514330)                                        

 

             AV LVOT peak              mmHg                      



             gradient (test code                                        



             = 1138131965)                                        

 

             AoV Area, Vmax (test 2.00 cm2     See_Comment                [Autom

ated



             code = 6784447506)                                        message] 

The



                                                                 system which



                                                                 generated this



                                                                 result



                                                                 transmitted



                                                                 reference range

:



                                                                 >=1.5. The



                                                                 reference range



                                                                 was not used to



                                                                 interpret this



                                                                 result as



                                                                 normal/abnormal

.

 

             LVOT VTI (CM) (test 11.00 cm                               



             code = 7387861447)                                        

 

             AoV Vmax (test code 0.98 m/s                               



             = 4893737142)                                        

 

             AoV Vmn (test code = 0.65 m/s                               



             0909475297)                                         

 

             AoV Area, VTI (test 2.42 cm2                               



             code = 7244767930)                                        

 

             Velocity Ratio 0.63 m/s                               



             (V1/V2) (test code =                                        



             4689)                                               

 

             MR peak grad (test              mmHg                      



             code = 1391014850)                                        

 

             MV stenosis pressure 31.00 ms     See_Comment                [Autom

ated



             1/2 time (test code                                        message]

 The



             = 4711901069)                                        system which



                                                                 generated this



                                                                 result



                                                                 transmitted



                                                                 reference range

:



                                                                 <=150. The



                                                                 reference range



                                                                 was not used to



                                                                 interpret this



                                                                 result as



                                                                 normal/abnormal

.

 

             MV E A ratio (test                                        



             code = 9977533937)                                        

 

             MV valve area p 1/2 7.10 cm2                               



             method (test code =                                        



             0947500142)                                         

 

             E prime lat (test                                        



             code = 2191978108)                                        

 

             E prine sept (test                                        



             code = 3908842114)                                        

 

             RVSP (test code =              See_Comment  A             [Automate

d



             3598714283)                                         message] The



                                                                 system which



                                                                 generated this



                                                                 result



                                                                 transmitted



                                                                 reference range

:



                                                                 40.00 mmHg. The



                                                                 reference range



                                                                 was not used to



                                                                 interpret this



                                                                 result as



                                                                 normal/abnormal

.

 

             RA pressure (test              mmHg                      



             code = 1603846848)                                        

 

             TR pk grad (test              mmHg                      



             code = 6699168017)                                        

 

             TR Vpeak (test code 3.51 m/s                               



             = 1835369162)                                        

 

             RVSP (TR) (test code              mmHg                      



             = 8262718744)                                        

 

             RVOT Vmax (test code 0.46 m/s                               



             = 4276971970)                                        

 

             PV Pk Grad (test              See_Comment                [Automated



             code = 4525582657)                                        message] 

The



                                                                 system which



                                                                 generated this



                                                                 result



                                                                 transmitted



                                                                 reference range

:



                                                                 36 mmHg. The



                                                                 reference range



                                                                 was not used to



                                                                 interpret this



                                                                 result as



                                                                 normal/abnormal

.

 

             RVOT pk grad (test              mmHg                      



             code = 5452054954)                                        

 

             PV VMAX (test code = 1.15 m/s     See_Comment                [Autom

ated



             6050796242)                                         message] The



                                                                 system which



                                                                 generated this



                                                                 result



                                                                 transmitted



                                                                 reference range

:



                                                                 <=3. The



                                                                 reference range



                                                                 was not used to



                                                                 interpret this



                                                                 result as



                                                                 normal/abnormal

.

 

             Ao root annulus 2.82 cm                                



             (test code =                                        



             8093128436)                                         

 

             Ao Root Diameter 3.23 cm      See_Comment                [Automated



             (test code =                                        message] The



             2675754051)                                         system which



                                                                 generated this



                                                                 result



                                                                 transmitted



                                                                 reference range

:



                                                                 <=3.99. The



                                                                 reference range



                                                                 was not used to



                                                                 interpret this



                                                                 result as



                                                                 normal/abnormal

.

 

             Ao Root Diameter 3.23 cm                                



             (test code =                                        



             1168964050)                                         

 

             Ascending aorta 3.69 cm                                



             (test code =                                        



             6927929547)                                         

 

             Pred METS R1 (test                                        



             code = 1683354584)                                        

 

             Pred Exer Dur R1                                        



             (test code =                                        



             8439778937)                                         

 

             MV Decel slope (test 11.26 m/s2                             



             code = 5191207711)                                        

 

             LV vol s cube 2D 16.83 ml                               



             (test code =                                        



             5008462274)                                         

 

             LV vol d cube 2D 63.08 ml                               



             (test code =                                        



             1905932896)                                         

 

             LV SV Cube 2D (test 46.25 ml                               



             code = 1155089062)                                        

 

             Calc MPHR (test code              bpm                       



             = 2263335075)                                        

 

             85 of MPHR (test                                        



             code = 3994288175)                                        

 

             MAX Pred HR (test                                        



             code = 8567894071)                                        

 

             LVOT mean grad (test              mmHg                      



             code = 9843835454)                                        

 

             Aov area Vmn (test 1.92 cm2                               



             code = 7547076344)                                        

 

             MV AE ratio (test                                        



             code = 5242446654)                                        

 

             LVOT Vmn (test code                                        



             = 8212402092)                                        

 

             MR Vmax (test code = 4.22 m/s                               



             8129582150)                                         

 

             AoV VTI (test code = 0.15 m                                 



             2421400486)                                         

 

             LVOT VTI (test code 0.11 m                                 



             = 6828087012)                                        

 

                          SUGEY (test code =          

                                                           



             SUGEY)          Left Ventricle:                           



                          Normal systolic                           



                          function with a                           



                          visually estimated                           



                          EF of 65 - 70%.                           



                          Grade I (impaired                           



                          relaxation)                            



                          diastolic                              



                                                    dysfunction.

                                                           



                           Left Atrium: Left                           



                          atrium is mildly                           



                                                    dilated.

                                                           



                           Right Ventricle:                           



                          Right ventricle is                           



                          moderately dilated.                           



                          Reduced systolic                           



                                                    function.

                                                           



                           Right Atrium: Right                           



                          atrium is moderately                           



                                                    dilated.

                                                           



                           Mitral Valve: Valve                           



                          structure is normal.                           



                          Mildly calcified                           



                          leaflets. Mild                           



                          valvular                               



                                                    regurgitation.

                                                           



                           Tricuspid Valve:                           



                          Moderate valvular                           



                          regurgitation.                           



                          Moderately elevated                           



                          pulmonary artery                           



                          systolic pressure.                           



                          RVSP is 59.82 mmHg.                           



                          Left VentricleLeft                           



                          ventricle size is                           



                          normal. Normal                           



                          systolic function                           



                          with a visually                           



                          estimated EF of 65 -                           



                          70%. Grade I                           



                          (impaired                              



                          relaxation)                            



                          diastolic                              



                          dysfunction.Right                           



                          VentricleRight                           



                          ventricle is                           



                          moderately dilated.                           



                          Reduced systolic                           



                          function.Left                           



                          AtriumLeft atrium is                           



                          mildly dilated.Right                           



                          AtriumRight atrium                           



                          is moderately                           



                          dilated.Mitral                           



                          ValveValve structure                           



                          is normal. Mildly                           



                          calcified leaflets.                           



                          Mild valvular                           



                          regurgitation.Tricus                           



                          pid ValveValve                           



                          structure is normal.                           



                          Moderate valvular                           



                          regurgitation.                           



                          Moderately elevated                           



                          pulmonary artery                           



                          systolic pressure.                           



                          RVSP is 59.82                           



                          mmHg.Aortic                            



                          ValveValve structure                           



                          appears tricuspid.                           



                          No significant                           



                          valvular                               



                          regurgitation. No                           



                          stenosis.Pulmonic                           



                          ValveValve structure                           



                          is                                     



                          normal.PericardiumTh                           



                          ere is no                              



                          pericardial effusion                           



                          present.Study                           



                          DetailsStudy quality                           



                          was good. A complete                           



                          2D, color flow                           



                          Doppler and spectral                           



                          Doppler                                



                          echocardiogram was                           



                          performed.The                           



                          apical, parasternal,                           



                          subcostal and                           



                          suprasternal views                           



                          were obtained.                           



                          Patient exhibited                           



                          sinus tachycardia.                           

 

             Lab Interpretation Abnormal                               



             (test code =                                        



             55561-1)                                            



[facility] [id number] 07:26:00





             Test Item    Value        Reference    Interpretation Comments



                                       Range                     

 

             Urine culture Proteus [id number]              A            Specime

n



             isolate (test cfu/mlThe                              InformationSpe

[facility]



             code = 18971-8) performance                            Source: Urin

eSpecimen



                          characteristics of                           Site: [place]

an catch



                          this assay on this                           



                          isolatewere                            



                          validated by the                           



                          Microbiology                           



                          Laboratory at                           



                          [facility]. This                           



                          source has not been                           



                          approved by the                           



                          U.S. Food and Drug                           



                          Administration.                           



                          The results are not                           



                          intended to be used                           



                          as the sole means                           



                          for clinical                           



                          diagnosis or                           



                          patient management.                           



                          The Microbiology                           



                          Laboratory is                           



                          authorized under                           



                          the clinical                           



                          Laboratory                             



                          Improvement                            



                          Amendments of 1988                           



                          (CLIA-88) to                           



                          perform high                           



                          complexity testing.                           

 

             Lab          Abnormal                               



             Interpretation                                        



             (test code =                                        



             38648-8)                                            



[facility] [id number] 07:26:00





             Test Item    Value        Reference    Interpretation Comments



                                       Range                     

 

             Urine culture Proteus [id number]              A            Specime

n



             isolate (test cfu/mlThe                              [facility]



             code = 22285-0) performance                            Source: Urin

eSpecimen



                          characteristics of                           Site: [place]

an catch



                          this assay on this                           



                          isolatewere                            



                          validated by the                           



                          Microbiology                           



                          Laboratory at                           



                          [facility]. This                           



                          source has not been                           



                          approved by the                           



                          U.S. Food and Drug                           



                          Administration. The                           



                          results are not                           



                          intended to be used                           



                          as the sole means                           



                          for clinical                           



                          diagnosis or                           



                          patient management.                           



                          The Microbiology                           



                          Laboratory is                           



                          authorized under                           



                          the clinical                           



                          Laboratory                             



                          Improvement                            



                          Amendments of                            



                          (CLIA-88) to                           



                          perform high                           



                          complexity testing.                           

 

             Lab          Abnormal                               



             Interpretation                                        



             (test code =                                        



             01888-5)                                            



[facility] [id number] 07:26:00





             Test Item    Value        Reference    Interpretation Comments



                                       Range                     

 

             Urine culture Proteus [id number]              A            Specime

n



             isolate (test cfu/mlThe                              [facility]



             code = 06869-4) performance                            Source: Urin

eSpecimen



                          characteristics of                           Site: [place]

an catch



                          this assay on this                           



                          isolatewere                            



                          validated by the                           



                          Microbiology                           



                          Laboratory at                           



                          [facility]. This                           



                          source has not been                           



                          approved by the                           



                          U.S. Food and Drug                           



                          Administration. The                           



                          results are not                           



                          intended to be used                           



                          as the sole means                           



                          for clinical                           



                          diagnosis or                           



                          patient management.                           



                          The Microbiology                           



                          Laboratory is                           



                          authorized under                           



                          the clinical                           



                          Laboratory                             



                          Improvement                            



                          Amendments of                            



                          (CLIA-88) to                           



                          perform high                           



                          complexity testing.                           

 

             Lab          Abnormal                               



             Interpretation                                        



             (test code =                                        



             93233-9)                                            



[facility] Respiratory specimen 
by EDWARD with probe [id number] 00:41:02





             Test Item    Value        Reference Range Interpretation Comments

 

             SARS-CoV-2 (COVID-19) RNA Not detected                           



             [Presence] in Respiratory                                        



             specimen by EDWARD with probe                                        



             detection (test code = 67286-4)                                    

    

 

             Whether patient is employed in a Unknown                           

     



             healthcare setting (test code =                                    

    



             91511-9)                                            

 

             Whether the patient has symptoms Unknown                           

     



             related to condition of interest                                   

     



             (test code = 44528-6)                                        

 

             Whether the patient was Unknown                                



             hospitalized for condition of                                      

  



             interest (test code = 84696-9)                                     

   

 

             Whether the patient was admitted Unknown                           

     



             to intensive care unit (ICU) for                                   

     



             condition of interest (test code                                   

     



             = 97364-8)                                          

 

             Whether patient resides in a Unknown                               

 



             congregate care setting (test                                      

  



             code = 20702-9)                                        

 

             Pregnancy status (test code = Unknown                              

  



             90736-1)                                            

 

             Date and time of symptom onset Unknown                             

   



             (test code = 51079-1)                                        



[facility]- XR [id number] 10:28:00
********************************[facility] : 1955 Sex: 
F************************************************************ Name: 
SUZI SEARS [facility] : 1955 Age/S: 66 / F [address] 
Unit #: [id number] Loc: [place] [address] Phys: Suzie Dominguez MD Acct: 
[id number] Dis Date:  Status: REG SDC PHONE #: [phone] Exam Date: 
2022 0950 FAX #: Reason: ERCP  EXAMS: CPT: [id number] XR ERCP 98358 Fluoro 
Time: 33 SEC DAP (Gy m2):  Air Kerma (mGy): EXAMINATION: - XR ERCP. LOCATION: 
S17. HISTORY: ERCP, CBD obstruction. COMPARISON: None. FINDINGS/ IMPRESSION: 
Nine portable limited intraoperative fluoroscopic images of right upper quadrant
 during ERCP are submitted to radiology department. Initial image demonstrates 
CBD stent and postoperative clips in upper abdomen. Subsequent images 
demonstrate contrast opacification of CBD and small bowel. Please see operative 
report for further details. No radiologist was present during procedure. 
FLUOROSCOPIC TIME: 35.6 seconds. Reference air Kerma: 11.146 mGy. ** 
Electronically Signed by ISH Paz ** ** on 2022 at 1028 **
 Reported and signed by: Roverto Paz M.D.  CC: Suzie Dominguez MD; 
Charisse Coyne MD PAGE 1 Signed Report Name:SUZI SEARS [place] 
: 1955 Age/S: 66 / F [address] Unit #: [id number] Loc: 
[place] [address] Phys: Suzie Dominguez MD Acct: [id number] Dis Date: Status: 
REG SDC PHONE #: [phone] Exam Date: 2022 0950 FAX #: Reason: ERCP 
EXAMS: CPT: [id number] XR ERCP [id number] Fluoro Time: 33 SEC DAP (Gy m2): Air Kerma 
(mGy): (Continued) Technologist: Maribel Diaz, RT(R) Trnscb Date/Time: 
2022 (1028) t.[email or web address] Orig Print D/T: S: 2022 (1032) PAGE 2 Signed 
[id number] 08:51:00





             Test Item    Value        Reference Range Interpretation Comments

 

             POTASSIUM (test code = K) 5.1 mmol/L   3.4-5.0      H            



CBC W/AUTO [id number] 08:15:00





             Test Item    Value        Reference Range Interpretation Comments

 

             WHITE BLOOD CELL (test code = 9.2 K/mm3    3.5-11.0     N          

  



             WBC)                                                

 

             RED BLOOD CELL (test code = 3.76 M/mm3   4.70-6.10    L            



             RBC)                                                

 

             HEMOGLOBIN (test code = HGB) 11.8 G/DL    10.4-14.9    N           

 

 

             HEMATOCRIT (test code = HCT) 37.7 %       31.5-44.1    N           

 

 

             MEAN CELL VOLUME (test code = 100.3 Fl     84.5-98.6    H          

  



             MCV)                                                

 

             MEAN CELL HGB (test code = MCH) 31.4 pg      27.0-34.2    N        

    

 

             MEAN CELL HGB CONCETRATION 31.3 G/DL    31.5-34.0    L            



             (test code = MCHC)                                        

 

             RED CELL DISTRIBUTION WIDTH 13.2 SD      11.5-14.5    N            



             (test code = RDW)                                        

 

             PLATELET COUNT (test code = 136 K/mm3    150-450      L            



             PLT)                                                

 

             MEAN PLATELET VOLUME (test code 9.80 fL      7.0-10.5     N        

    



             = MPV)                                              

 

             NEUTROPHIL % (test code = NT%) 71.9 %       40-76        N         

   

 

             IMMATURE GRANULOCYTE % (test 0.9 %        0.0-5.0      N           

 



             code = IG%)                                         

 

             LYMPHOCYTE % (test code = LY%) 18.1 %       20.5-51.1    L         

   

 

             MONOCYTE % (test code = MO%) 6.8 %        1.7-9.3      N           

 

 

             EOSINOPHIL % (test code = EO%) 1.5 %        0.0-6.0      N         

   

 

             BASOPHIL % (test code = BA%) 0.8 %        0.0-2.0      N           

 

 

             NUCLEATED RBC % (test code = 0.0 /100WBC% 0.0-1.0      N           

 



             NRBC%)                                              

 

             NEUTROPHIL # (test code = NT#) 6.7 K/mm3    1.8-7.6      N         

   

 

             IMMATURE GRANULOCYTE # (test 0.08 x10 3/uL 0.00-0.03    H          

  



             code = IG#)                                         

 

             LYMPHOCYTE # (test code = LY#) 1.7 K/mm3    0.6-3.2      N         

   

 

             MONOCYTE # (test code = MO#) 0.6 K/mm3    0.3-1.1      N           

 

 

             EOSINOPHIL # (test code = EO#) 0.1 K/mm3    0.0-0.4      N         

   

 

             BASOPHIL # (test code = BA#) 0.1 K/mm3    0.0-0.1      N           

 

 

             NUCLEATED RBC # (test code = 0.0 K/mm3    0.0-0.1      N           

 



             NRBC#)                                              

 

             MANUAL DIFF REQUIRED (test code NO DIFF/SCN  CRITERIA              

    



             = MDIFF)                                            



BASIC METABOLIC [id number] 08:08:00





             Test Item    Value        Reference Range Interpretation Comments

 

             SODIUM (test code = NA) 135 mmol/L   134-147      N            

 

             POTASSIUM (test code = K) 5.9 mmol/L   3.4-5.0      HH           

 

             CHLORIDE (test code = CL) 103 mmol/L   100-108      N            

 

             CARBON DIOXIDE (test code = CO2) 27 mmol/L    21-32        N       

     

 

             ANION GAP (test code = GAP) 5.0 GAP calc 4.0-15.0     N            

 

             GLUCOSE (test code = GLU) 109 MG/DL           N            

 

             BLOOD UREA NITROGEN (test code = 43 MG/DL     7-18         H       

     



             BUN)                                                

 

             GLOMERULAR FILTRATION RATE (test 4 estGFR     >60          L       

     



             code = GFR)                                         

 

             CREATININE (test code = CREAT) 10.5 MG/DL   0.6-1.0      H         

   

 

             CALCIUM (test code = CA) 9.9 MG/DL    8.5-10.1     N            



COVID 19 INHOUSE [id number] 14:11:00





             Test Item    Value        Reference Range Interpretation Comments

 

             COVID 19 INHOUSE AG NEGATIVE     Negative                  Per manu

facturer,



             (test code =                                        negative result

s should



             [id number])                                        be treated aspr

esumptive



                                                                 and, if inconsi

stent with



                                                                 clinical signs



                                                                 andsymptoms or 

necessary



                                                                 for patient man

agement,



                                                                 should betested

 with an



                                                                 alternative mol

ecular



                                                                 assay. Negative

 resultsdo



                                                                 not preclude SA

RS-CoV-2



                                                                 infection and s

hould not



                                                                 be usedas the s

ole basis



                                                                 for patient man

agement



                                                                 decisions. Nega

tive



                                                                 results should 

be



                                                                 considered in t

he context



                                                                 of apatient's r

ecent



                                                                 exposures, hist

ory,



                                                                 presence of cli

nicalsigns



                                                                 and symptoms co

nsistent



                                                                 with COVID-19.



CBC W/AUTO [id number] 14:00:00





             Test Item    Value        Reference Range Interpretation Comments

 

             WHITE BLOOD CELL 7.2 K/mm3    3.5-11.0     N            



             (test code = WBC)                                        

 

             RED BLOOD CELL (test 4.01 M/mm3   4.70-6.10    L            



             code = RBC)                                         

 

             HEMOGLOBIN (test code 12.6 G/DL    10.4-14.9    N            



             = HGB)                                              

 

             HEMATOCRIT (test code 39.7 %       31.5-44.1    N            



             = HCT)                                              

 

             MEAN CELL VOLUME 99.0 Fl      84.5-98.6    H            



             (test code = MCV)                                        

 

             MEAN CELL HGB (test 31.4 pg      27.0-34.2    N            



             code = MCH)                                         

 

             MEAN CELL HGB 31.7 G/DL    31.5-34.0    N            



             CONCETRATION (test                                        



             code = MCHC)                                        

 

             RED CELL DISTRIBUTION 13.3 SD      11.5-14.5    N            



             WIDTH (test code =                                        



             RDW)                                                

 

             PLATELET COUNT (test 106 K/mm3    150-450      L            



             code = PLT)                                         

 

             MEAN PLATELET VOLUME 10.20 fL     7.0-10.5     N            



             (test code = MPV)                                        

 

             NEUTROPHIL % (test 72.0 %       40-76        N            



             code = NT%)                                         

 

             IMMATURE GRANULOCYTE 0.7 %        0.0-5.0      N            



             % (test code = IG%)                                        

 

             LYMPHOCYTE % (test 17.8 %       20.5-51.1    L            



             code = LY%)                                         

 

             MONOCYTE % (test code 7.4 %        1.7-9.3      N            



             = MO%)                                              

 

             EOSINOPHIL % (test 1.4 %        0.0-6.0      N            



             code = EO%)                                         

 

             BASOPHIL % (test code 0.7 %        0.0-2.0      N            



             = BA%)                                              

 

             NUCLEATED RBC % (test 0.0 /100WBC% 0.0-1.0      N            



             code = NRBC%)                                        

 

             NEUTROPHIL # (test 5.2 K/mm3    1.8-7.6      N            



             code = NT#)                                         

 

             IMMATURE GRANULOCYTE 0.05 x10 3/uL 0.00-0.03    H            



             # (test code = IG#)                                        

 

             LYMPHOCYTE # (test 1.3 K/mm3    0.6-3.2      N            



             code = LY#)                                         

 

             MONOCYTE # (test code 0.5 K/mm3    0.3-1.1      N            



             = MO#)                                              

 

             EOSINOPHIL # (test 0.1 K/mm3    0.0-0.4      N            



             code = EO#)                                         

 

             BASOPHIL # (test code 0.1 K/mm3    0.0-0.1      N            



             = BA#)                                              

 

             NUCLEATED RBC # (test 0.0 K/mm3    0.0-0.1      N            



             code = NRBC#)                                        

 

             MANUAL DIFF REQUIRED NO DIFF/SCN  CRITERIA                  SLIDE R

SERGIOW



             (test code = MDIFF)                                        CONSISTA

NT WITH



                                                                 AUTO DIFFERENTI

AL.



BASIC METABOLIC [id number] 13:32:00





             Test Item    Value        Reference Range Interpretation Comments

 

             SODIUM (test code = NA) 136 mmol/L   134-147      N            

 

             POTASSIUM (test code = K) 5.1 mmol/L   3.4-5.0      H            

 

             CHLORIDE (test code = CL) 103 mmol/L   100-108      N            

 

             CARBON DIOXIDE (test code = CO2) 26 mmol/L    21-32        N       

     

 

             ANION GAP (test code = GAP) 7.0 GAP calc 4.0-15.0     N            

 

             GLUCOSE (test code = GLU) 103 MG/DL           N            

 

             BLOOD UREA NITROGEN (test code = 37 MG/DL     7-18         H       

     



             BUN)                                                

 

             GLOMERULAR FILTRATION RATE (test 6 estGFR     >60          L       

     



             code = GFR)                                         

 

             CREATININE (test code = CREAT) 7.4 MG/DL    0.6-1.0      H         

   

 

             CALCIUM (test code = CA) 10.1 MG/DL   8.5-10.1     N            



HEPATIC FUNCTION [id number] 13:32:00





             Test Item    Value        Reference Range Interpretation Comments

 

             TOTAL PROTEIN (test code = PROT) 7.7 G/DL     6.4-8.2      N       

     

 

             ALBUMIN (test code = ALB) 3.4 G/DL     3.4-5.0      N            

 

             BILIRUBIN TOTAL (test code = BILT) 2.10 MG/DL   0.2-1.2      H     

       

 

             BILIRUBIN DIRECT (test code = 0.50 MG/DL   0.00-0.30    H          

  



             BILD)                                               

 

             BILIRUBIN INDIRECT (test code = 1.60 MG/DL   0.2-1.2      H        

    



             BILIND)                                             

 

             SGOT/AST (test code = AST) 17 Unit/L    15-37        N            

 

             SGPT/ALT (test code = ALT) 27 Unit/L    12-78        N            

 

             ALKALINE PHOSPHATASE TOTAL (test 222 Unit/L          H       

     



             code = ALKP)                                        



[id number] 13:32:00





             Test Item    Value        Reference Range Interpretation Comments

 

             LIPASE (test code = LIP) 109 Unit/L   114-286      L            



[id number] 18:01:00





             Test Item    Value        Reference Range Interpretation Comments

 

             SURGICAL (test --------------------------------                    

       



             code = SR)   --------------------------------                      

     



                          ----------------------------RUN                       

    



                          DATE: 22 [facility] PAGE 1 RUN TIME:                       

    



                          1801 Specimen Inquiry RUN USER:                       

    



                          INTERFACE                              



                          --------------------------------                      

     



                          --------------------------------                      

     



                          ----------------------------KYLE                      

     



                          ENT: SUZI SEARS #:                          

 



                          [id number] LOC: L.END U #:                          

 



                          [id number]  AGE/SX: 66/F ROOM:                        

   



                          RE22REG DR:                           



                          Clark Cuellar MD :                           



                          57 BED: DIS:  STATUS: LEEROY                       

    



                          [facility] TLOC:                              



                          --------------------------------                      

     



                          --------------------------------                      

     



                          ----------------------------                          

 



                          SPEC #: 22:PMC: RECD:                           



                           STATUS: VERONICA NOÉ                        

   



                          #: [id number] SOFIA:                        

    



                          [facility] DR: Clark Cuellar MD                          

 



                          ENTERED:  SP TYPE:                       

    



                          SURGICAL OTHR DR:                           



                          Charisse Coyne MD ORDERED:                         

  



                          13317/2, 27935, 59416, 42877,                         

  



                          ANATOMIC SPEC, SPECIMEN TRACK                         

  



                          COPIES TO: Charisse Coyne MD                       

    



                          [address] [place] [address] [phone]                           



                          [place] [address]                         

  



                          [phone] PROCEDURES: [id number]                        

   



                          (22-) 34880                           



                          () 38242                           



                          () 15070                           



                          () SPECIMEN TRACK                        

   



                          () TISSUES: A.                           



                          GASTRIC MUCOSA - BX ANTRUM AND                        

   



                          BODY B. ESOPHAGUS BIOPSY -                           



                          DISTAI ESOPHAGUS BX  FINAL                           



                          DIAGNOSIS A. Stomach, antrum and                      

     



                          body, endoscopic biopsy:-                           



                          Healing/reparative/chemical                           



                          gastropathy changes, minimal to                       

    



                          mild- Negative for intestinal                         

  



                          metaplasia- Negative for                           



                          Helicobacter pylori                           



                          (Immunohistochemistry stain) B.                       

    



                          Esophagus, distal, endoscopic                         

  



                          biopsy:- Reflux esophagitis,                          

 



                          mild- Negative for glandular                          

 



                          epithelium/intestinal                           



                          metaplasia/dysplasia (Alcian                          

 



                          Blue stain)- Negative for fungal                      

     



                          organisms (PAS stain) Comment:                        

   



                          Suggest clinical correlation.                         

  



                          Note: For each marker stain                           



                          tested above: Positive control                        

   



                          is positive and                           



                          Negative(external or internal)                        

   



                          control is negative (staining                         

  



                          performed at [facility]).                       

    



                          ** CONTINUED ON NEXT PAGE **                          

 



                          --------------------------------                      

     



                          --------------------------------                      

     



                          ----------------------------RUN                       

    



                          DATE: 22 [facility] PAGE 2 RUN TIME:                       

    



                          1801 Specimen Inquiry RUN USER:                       

    



                          INTERFACE                              



                          --------------------------------                      

     



                          --------------------------------                      

     



                          ----------------------------SPEC                      

     



                          #: 22:[facility]: PATIENT:                           



                          SUZI SEARS #[id number]                         

  



                          (Continued)---------------------                      

     



                          --------------------------------                      

     



                          --------------------------------                      

     



                          ------- GROSS DESCRIPTION A.                          

 



                          Antrum and body biopsy. It                           



                          consists of 4 tissue fragments                        

   



                          measuring 2-5 mm. All as A1. B.                       

    



                          Distal esophageal biopsy. It                          

 



                          consists of 2 tissue fragments                        

   



                          measuring 3 mm each. Allas B1.                        

   



                          Technical component performed at                      

     



                          [id number] ,                        

   



                          [place] [address] Unless gross                         

  



                          only, the diagnosis is based                          

 



                          upon microscopic                           



                          examination.Immunohistochemistry                      

     



                          : This test was developed and                         

  



                          its performancecharacteristics                        

   



                          determined by this laboratory.                        

   



                          It has not been approved nordoes                      

     



                          it need approval by the [place] FDA.                       

    



                          Appropriate positive and                           



                          negative controlsare reviewed                         

  



                          and judged to be acceptable.                          

 



                          This laboratory is certified                          

 



                          underthe Clinical Laboratory                          

 



                          Improvement Amendments (CLIA-88)                      

     



                          as qualified toperform high                           



                          complexity clinical laboratory                        

   



                          testing. MICROSCOPIC DESCRIPTION                      

     



                          Findings are incorporated into                        

   



                          the diagnosis                           



                          section.------------------------                      

     



                          --------------------------------                      

     



                          --------------------------------                      

     



                          ---- Signed SIGNATURE ON Kye Zambrano 22 1801                         

  



                          --------------------------------                      

     



                          --------------------------------                      

     



                          ----------------------------  **                      

     



                          END OF REPORT **                           



BASIC METABOLIC [id number] 08:13:00





             Test Item    Value        Reference Range Interpretation Comments

 

             SODIUM (test code = NA) 137 mmol/L   134-147      N            

 

             POTASSIUM (test code = K) 4.2 mmol/L   3.4-5.0      N            

 

             CHLORIDE (test code = CL) 105 mmol/L   100-108      N            

 

             CARBON DIOXIDE (test code = CO2) 23 mmol/L    21-32        N       

     

 

             ANION GAP (test code = GAP) 9.0 GAP calc 4.0-15.0     N            

 

             GLUCOSE (test code = GLU) 111 MG/DL           H            

 

             BLOOD UREA NITROGEN (test code = 41 MG/DL     7-18         H       

     



             BUN)                                                

 

             GLOMERULAR FILTRATION RATE (test 5 estGFR     >60          L       

     



             code = GFR)                                         

 

             CREATININE (test code = CREAT) 8.6 MG/DL    0.6-1.0      H         

   

 

             CALCIUM (test code = CA) 9.8 MG/DL    8.5-10.1     N            



- XR CHEST 2 -27-39 13:28:00
[facility] 1955 Sex: 
F************************************************************ Name: 
SUZI SEARS [place] : 1955 Age/S: 66 / F [id number] 
Unit #: [id number] Loc: [place] [address] Phys: Clark Cuellar MD  Acct: 
[id number] Dis Date: Status: PRE SDC PHONE #: [phone] Exam Date: 
2022 1253 FAX #:  Reason: PRE OP EXAMS: CPT: [id number] XR CHEST 2 V 14717 
 Fluoro Time: DAP (Gy m2): Air Kerma (mGy): Chest 2 views 2022 1:27 PM 
CLINICAL HISTORY: Preop COMPARISON: None available LOCATION: W1 IMPRESSION: 
There is elevation of the right hemidiaphragm. Bibasilar opacities suggest 
atelectasis. Pneumonia should be excluded clinically. Cardiomediastinalcontours 
are within normal limits. The central vasculature is not engorged. A tunneled 
left hemodialysis catheter is present. There are chronic appearing degenerative 
changes in the skeleton. ** Electronically Signed by M.D. Timothy Seipel ** ** 
on 2022 at 1328  ** Reported and signed by: Timothy Seipel, M.D. CC: 
Charisse Coyne MD; Clark Cuellar MD  PAGE 1 Signed Report Name: 
SUZI SEARS [place] : 1955 Age/S: 66 / F [id number] [address] 
Unit #: [id number] Loc: [place] [address] Phys: Clark Cuellar MD Acct: 
[id number] Dis Date: Status: PRE SDC PHONE #: [phone] Date: 
2022 1253  FAX #: Reason: PRE OP EXAMS: CPT: [id number] XR CHEST 2 V  80207
 Fluoro Time: DAP (Gy m2): Air Kerma (mGy): (Continued) Technologist: Kylah Jacobo, RT (R)(CT)  Trnscb Date/Time: 2022 (2141) [email or web address] Orig Print D/T:
 S: 2022 (5682) PAGE  2 Signed ReportBASIC METABOLIC [id number] 
12:55:00





             Test Item    Value        Reference Range Interpretation Comments

 

             SODIUM (test code = NA) 134 mmol/L   134-147      N            

 

             POTASSIUM (test code = K) 4.6 mmol/L   3.4-5.0      N            

 

             CHLORIDE (test code = CL) 100 mmol/L   100-108      N            

 

             CARBON DIOXIDE (test code = CO2) 28 mmol/L    21-32        N       

     

 

             ANION GAP (test code = GAP) 6.0 GAP calc 4.0-15.0     N            

 

             GLUCOSE (test code = GLU) 113 MG/DL           H            

 

             BLOOD UREA NITROGEN (test code = 38 MG/DL     7-18         H       

     



             BUN)                                                

 

             GLOMERULAR FILTRATION RATE (test 6 estGFR     >60          L       

     



             code = GFR)                                         

 

             CREATININE (test code = CREAT) 7.0 MG/DL    0.6-1.0      H         

   

 

             CALCIUM (test code = CA) 10.5 MG/DL   8.5-10.1     H            



COVID 19 INHOUSE [id number] 12:52:00





             Test Item    Value        Reference Range Interpretation Comments

 

             COVID 19 INHOUSE AG NEGATIVE     Negative                  Per manu

facturer,



             (test code =                                        negative result

s should



             [id number])                                        be treated aspr

esumptive



                                                                 and, if inconsi

stent with



                                                                 clinical signs



                                                                 andsymptoms or 

necessary



                                                                 for patient man

agement,



                                                                 should betested

 with an



                                                                 alternative mol

ecular



                                                                 assay. Negative

 resultsdo



                                                                 not preclude SA

RS-CoV-2



                                                                 infection and s

hould not



                                                                 be usedas the s

ole basis



                                                                 for patient man

agement



                                                                 decisions. Nega

tive



                                                                 results should 

be



                                                                 considered in t

he context



                                                                 of apatient's r

ecent



                                                                 exposures, hist

ory,



                                                                 presence of cli

nicalsigns



                                                                 and symptoms co

nsistent



                                                                 with COVID-19.



PROTHROMBIN [id number] 12:44:00





             Test Item    Value        Reference Range Interpretation Comments

 

             PT PATIENT (test 12.2 SECONDS 9.3-12.9     N            



             code = PTP)                                         

 

             INTERNATIONAL NORMAL 1.07 INR Unit 0.8-1.2      N             TARGE

T INR BY



             RATIO (test code =                                        INDICATIO

N Indication



             INR)                                                INR1. Prophylax

is of



                                                                 venous thrombos

is 2.0



                                                                 - 3.0 (orthoped

ic



                                                                 surgery), Proph

ylaxis



                                                                 of venous throm

bosis



                                                                 (other than hig

h-risk



                                                                 surgery), Treat

ment of



                                                                 Deep Vein



                                                                 Thrombosis/Pulm

onary



                                                                 Embolism, Preve

ntion



                                                                 of systemic emb

olism -



                                                                 Tissue heart va

lves,



                                                                 Acute Myocardia

l



                                                                 Infarction (to 

prevent



                                                                 systemic emboli

sm),



                                                                 Valvular heart



                                                                 disease, Acute



                                                                 Myocardial Infa

rction



                                                                 (to prevent sys

temic



                                                                 embolism), Valv

ular



                                                                 heart disease, 

Atrial



                                                                 Fibrillation,



                                                                 Bileaflet mecha

nical



                                                                 valve in aortic



                                                                 position.2. Mec

hanical



                                                                 prosthetic valv

es



                                                                 (high risk), 2.

5 - 3.5



                                                                 Presence of Lup

us



                                                                 Anticoagulant o

r



                                                                 Antiphospholipi

d



                                                                 Antibodies, Pre

vention



                                                                 of systemic emb

olism -



                                                                 Acute Myocardia

l



                                                                 Infarction (to 

prevent



                                                                 recurrent infar

ct).



THROMBOPLASTIN TIME [id number] 12:44:00





             Test Item    Value        Reference Range Interpretation Comments

 

             THROMBOPLASTIN TIME PARTIAL 36.2 SECONDS 26-35        H            



             (test code = PTT)                                        



CBC W/AUTO [id number] 12:43:00





             Test Item    Value        Reference Range Interpretation Comments

 

             WHITE BLOOD CELL (test code = 9.1 K/mm3    3.5-11.0     N          

  



             WBC)                                                

 

             RED BLOOD CELL (test code = 4.16 M/mm3   4.70-6.10    L            



             RBC)                                                

 

             HEMOGLOBIN (test code = HGB) 13.1 G/DL    10.4-14.9    N           

 

 

             HEMATOCRIT (test code = HCT) 41.8 %       31.5-44.1    N           

 

 

             MEAN CELL VOLUME (test code = 100.5 Fl     84.5-98.6    H          

  



             MCV)                                                

 

             MEAN CELL HGB (test code = MCH) 31.5 pg      27.0-34.2    N        

    

 

             MEAN CELL HGB CONCETRATION 31.3 G/DL    31.5-34.0    L            



             (test code = MCHC)                                        

 

             RED CELL DISTRIBUTION WIDTH 13.8 SD      11.5-14.5    N            



             (test code = RDW)                                        

 

             PLATELET COUNT (test code = 140 K/mm3    150-450      L            



             PLT)                                                

 

             MEAN PLATELET VOLUME (test code 10.20 fL     7.0-10.5     N        

    



             = MPV)                                              

 

             NEUTROPHIL % (test code = NT%) 71.8 %       40-76        N         

   

 

             IMMATURE GRANULOCYTE % (test 1.3 %        0.0-5.0      N           

 



             code = IG%)                                         

 

             LYMPHOCYTE % (test code = LY%) 18.2 %       20.5-51.1    L         

   

 

             MONOCYTE % (test code = MO%) 6.7 %        1.7-9.3      N           

 

 

             EOSINOPHIL % (test code = EO%) 1.3 %        0.0-6.0      N         

   

 

             BASOPHIL % (test code = BA%) 0.7 %        0.0-2.0      N           

 

 

             NUCLEATED RBC % (test code = 0.0 /100WBC% 0.0-1.0      N           

 



             NRBC%)                                              

 

             NEUTROPHIL # (test code = NT#) 6.5 K/mm3    1.8-7.6      N         

   

 

             IMMATURE GRANULOCYTE # (test 0.12 x10 3/uL 0.00-0.03    H          

  



             code = IG#)                                         

 

             LYMPHOCYTE # (test code = LY#) 1.7 K/mm3    0.6-3.2      N         

   

 

             MONOCYTE # (test code = MO#) 0.6 K/mm3    0.3-1.1      N           

 

 

             EOSINOPHIL # (test code = EO#) 0.1 K/mm3    0.0-0.4      N         

   

 

             BASOPHIL # (test code = BA#) 0.1 K/mm3    0.0-0.1      N           

 

 

             NUCLEATED RBC # (test code = 0.0 K/mm3    0.0-0.1      N           

 



             NRBC#)                                              

 

             MANUAL DIFF REQUIRED (test code NO DIFF/SCN  CRITERIA              

    



             = MDIFF)                                            



SARS-CoV-2 (COVID-19) RNA [Presence] in Respiratory specimen by EDWARD with probe 
[id number] 23:10:10





             Test Item    Value        Reference Range Interpretation Comments

 

             SARS coronavirus RNA [Presence] Not detected                       

    



             in Isolate by EDWARD with probe                                       

 



             detection (test code = 96087-7)                                    

    

 

             Whether patient is employed in a No                                

     



             healthcare setting (test code =                                    

    



             93795-8)                                            

 

             Whether the patient has symptoms Yes                               

     



             related to condition of interest                                   

     



             (test code = 04243-3)                                        

 

             Whether the patient was No                                     



             hospitalized for condition of                                      

  



             interest (test code = 02724-1)                                     

   

 

             Whether the patient was admitted No                                

     



             to intensive care unit (ICU) for                                   

     



             condition of interest (test code                                   

     



             = 73301-9)                                          

 

             Whether patient resides in a No                                    

 



             congregate care setting (test                                      

  



             code = 49069-7)                                        

 

             Pregnancy status (test code = No                                   

  



             44641-4)                                            

 

             Date and time of symptom onset Unknown                             

   



             (test code = 30467-5)                                        



[facility]NBQNMJRO[facility]YfQ[facility] (COVID-19) RNA [Presence] in 
Respiratory specimen by EDWARD with probe [id number] 23:10:10





             Test Item    Value        Reference Range Interpretation Comments

 

             SARS-CoV-2 (COVID-19) RNA Not detected                           



             [Presence] in Respiratory                                        



             specimen by EDWARD with probe                                        



             detection (test code = 98242-5)                                    

    

 

             Whether patient is employed in a No                                

     



             healthcare setting (test code =                                    

    



             64361-1)                                            

 

             Whether the patient has symptoms Yes                               

     



             related to condition of interest                                   

     



             (test code = 97934-9)                                        

 

             Whether the patient was No                                     



             hospitalized for condition of                                      

  



             interest (test code = 44782-6)                                     

   

 

             Whether the patient was admitted No                                

     



             to intensive care unit (ICU) for                                   

     



             condition of interest (test code                                   

     



             = 87227-3)                                          

 

             Whether patient resides in a No                                    

 



             congregate care setting (test                                      

  



             code = 82931-7)                                        

 

             Pregnancy status (test code = No                                   

  



             25794-3)                                            

 

             Date and time of symptom onset Unknown                             

   



             (test code = 25331-0)                                        



[facility] CenterOTZCNDMSOZHI-KfT-7 (COVID-19) RNA [Presence] in 
Respiratory specimen by EDWARD with probe [id number] 22:34:30





             Test Item    Value        Reference Range Interpretation Comments

 

             SARS-CoV-2 (COVID-19) RNA Not detected Not-Detected              



             [Presence] in Respiratory                                        



             specimen by EDWARD with probe                                        



             detection (test code = 75399-6)                                    

    

 

             Whether patient is employed in a                                   

     



             healthcare setting (test code =                                    

    



             34586-1)                                            

 

             Whether the patient has symptoms                                   

     



             related to condition of interest                                   

     



             (test code = 75000-2)                                        

 

             Patient was hospitalized because                                   

     



             of this condition (test code =                                     

   



             88084-0)                                            

 

             Whether the patient was admitted                                   

     



             to intensive care unit (ICU) for                                   

     



             condition of interest (test code                                   

     



             = 95473-6)                                          

 

             Whether patient resides in a                                       

 



             congregate care setting (test                                      

  



             code = 49252-2)                                        



[facility] [Presence] in 
Respiratory specimen by EDWARD with probe [id number] 22:35:42





             Test Item    Value        Reference Range Interpretation Comments

 

             SARS-CoV-2 (COVID-19) RNA Not detected Not-Detected              



             [Presence] in Respiratory                                        



             specimen by EDWARD with probe                                        



             detection (test code = 25328-9)                                    

    

 

             Whether patient is employed in a                                   

     



             healthcare setting (test code =                                    

    



             53644-9)                                            

 

             Whether the patient has symptoms                                   

     



             related to condition of interest                                   

     



             (test code = 79440-4)                                        

 

             Patient was hospitalized because                                   

     



             of this condition (test code =                                     

   



             19976-5)                                            

 

             Whether the patient was admitted                                   

     



             to intensive care unit (ICU) for                                   

     



             condition of interest (test code                                   

     



             = 54411-5)                                          

 

             Whether patient resides in a                                       

 



             congregate care setting (test                                      

  



             code = 95660-4)                                        



[facility] [Presence] in 
Respiratory specimen by EDWARD with probe [id number] 22:46:10





             Test Item    Value        Reference Range Interpretation Comments

 

             SARS-CoV-2 (COVID-19) RNA Not detected Not-Detected              



             [Presence] in Respiratory                                        



             specimen by EDWARD with probe                                        



             detection (test code = 12848-9)                                    

    

 

             Whether patient is employed in a                                   

     



             healthcare setting (test code =                                    

    



             34530-1)                                            

 

             Whether the patient has symptoms                                   

     



             related to condition of interest                                   

     



             (test code = 48418-8)                                        

 

             Patient was hospitalized because                                   

     



             of this condition (test code =                                     

   



             37530-6)                                            

 

             Whether the patient was admitted                                   

     



             to intensive care unit (ICU) for                                   

     



             condition of interest (test code                                   

     



             = 43630-3)                                          

 

             Whether patient resides in a                                       

 



             congregate care setting (test                                      

  



             code = 35020-6)                                        



[facility]-CoV[facility] (COVID-19) RNA [Presence] in 
Respiratory specimen by EDWARD with probe [id number] 01:54:24





             Test Item    Value        Reference Range Interpretation Comments

 

             SARS-CoV-2 (COVID-19) RNA Not detected Not-Detected              



             [Presence] in Respiratory                                        



             specimen by EDWARD with probe                                        



             detection (test code = 72642-9)                                    

    

 

             Whether patient is employed in a                                   

     



             healthcare setting (test code =                                    

    



             97692-9)                                            

 

             Whether the patient has symptoms                                   

     



             related to condition of interest                                   

     



             (test code = 48166-1)                                        

 

             Patient was hospitalized because                                   

     



             of this condition (test code =                                     

   



             09734-2)                                            

 

             Whether the patient was admitted                                   

     



             to intensive care unit (ICU) for                                   

     



             condition of interest (test code                                   

     



             = 15385-6)                                          

 

             Whether patient resides in a                                       

 



             congregate care setting (test                                      

  



             code = 90593-5)                                        



[facility]-CoV[facility] (COVID-19) RNA [Presence] in 
Respiratory specimen by EDWARD with probe [id number] 21:44:06





             Test Item    Value        Reference Range Interpretation Comments

 

             SARS-CoV-2 (COVID-19) RNA Not detected Not-Detected              



             [Presence] in Respiratory                                        



             specimen by EDWARD with probe                                        



             detection (test code = 96448-0)                                    

    

 

             Whether patient is employed in a                                   

     



             healthcare setting (test code =                                    

    



             52041-3)                                            

 

             Whether the patient has symptoms                                   

     



             related to condition of interest                                   

     



             (test code = 90625-9)                                        

 

             Patient was hospitalized because                                   

     



             of this condition (test code =                                     

   



             01100-3)                                            

 

             Whether the patient was admitted                                   

     



             to intensive care unit (ICU) for                                   

     



             condition of interest (test code                                   

     



             = 19664-5)                                          

 

             Whether patient resides in a                                       

 



             congregate care setting (test                                      

  



             code = 66172-9)                                        



[facility]-CoV-2 (COVID-19) RNA [Presence] in 
Respiratory specimen by EDWARD with probe [id number] 00:36:59





             Test Item    Value        Reference Range Interpretation Comments

 

             SARS-CoV-2 (COVID-19) RNA Not detected Not-Detected              



             [Presence] in Respiratory                                        



             specimen by EDWARD with probe                                        



             detection (test code = 81125-4)                                    

    



[facility] (COVID-19) RNA [Presence] in 
Respiratory specimen by EDWARD with probe [id number] 17:35:35





             Test Item    Value        Reference Range Interpretation Comments

 

             SARS-CoV-2 (COVID-19) RNA Not detected Not-Detected              



             [Presence] in Respiratory                                        



             specimen by EDWARD with probe                                        



             detection (test code = 32639-0)                                    

    



[facility] (COVID-19) RNA [Presence] in 
Respiratory specimen by EDWARD with probe [id number] 18:03:30





             Test Item    Value        Reference Range Interpretation Comments

 

             SARS-CoV-2 (COVID-19) RNA Not detected Not-Detected              



             [Presence] in Respiratory                                        



             specimen by EDWARD with probe                                        



             detection (test code = 51197-9)                                    

    



[facility]-CoV[facility] (COVID-19) RNA [Presence] in 
Respiratory specimen by EDWARD with probe [id number] 10:06:03





             Test Item    Value        Reference Range Interpretation Comments

 

             SARS-CoV-2 (COVID-19) RNA Not detected Not-Detected              



             [Presence] in Respiratory                                        



             specimen by EDWARD with probe                                        



             detection (test code = 91157-3)                                    

    



[facility]-CoV[facility] (COVID-19) RNA [Presence] in 
Respiratory specimen by EDWARD with probe rxjurezjs6918-29-08 05:11:58





             Test Item    Value        Reference Range Interpretation Comments

 

             SARS-CoV-2 (COVID-19) RNA Not detected Not-Detected              



             [Presence] in Respiratory                                        



             specimen by EDWARD with probe                                        



             detection (test code = 06621-8)                                    

    



[facility]-CoV[facility] (COVID-19) RNA [Presence] in 
Respiratory specimen by EDWARD with probe [id number] 03:34:16





             Test Item    Value        Reference Range Interpretation Comments

 

             SARS-CoV-2 (COVID-19) RNA Not detected Not-Detected              



             [Presence] in Respiratory                                        



             specimen by EDWARD with probe                                        



             detection (test code = 48608-5)                                    

    



[facility]-CoV[facility] (COVID-19) RNA [Presence] in 
Respiratory specimen by EDWARD with probe [id number] 10:06:41





             Test Item    Value        Reference Range Interpretation Comments

 

             SARS-CoV-2 (COVID-19) RNA Not detected Not-Detected              



             [Presence] in Respiratory                                        



             specimen by EDWARD with probe                                        



             detection (test code = 08231-6)                                    

    



[facility]2020-10-14 12:33:00





             Test Item    Value        Reference Range Interpretation Comments

 

             Chol (test code = Chol) 129                                    



[facility]2020-10-14 12:33:00





             Test Item    Value        Reference Range Interpretation Comments

 

             HDL (test code = HDL) 37                                     



[facility]2020-10-14 12:33:00





             Test Item    Value        Reference Range Interpretation Comments

 

             Trig (test code = Trig) 76                                     



[facility]





             Test Item    Value        Reference Range Interpretation Comments

 

             LDL (Calculated) (test code = LDL 76                               

      



             (Calculated))                                        



[facility]2020-10-14 12:33:00





             Test Item    Value        Reference Range Interpretation Comments

 

             CHD Risk (test code = CHD Risk) 3.5                                

    



[facility]





             Test Item    Value        Reference Range Interpretation Comments

 

             Non HDL Chol (test code = Non HDL Chol) 92                         

            



[facility]2020-10-14 12:33:00





             Test Item    Value        Reference Range Interpretation Comments

 

             Chol (test code = Chol) 129                                    



[facility]-10-14 12:33:00





             Test Item    Value        Reference Range Interpretation Comments

 

             HDL (test code = HDL) 37                                     



[facility]-10-14 12:33:00





             Test Item    Value        Reference Range Interpretation Comments

 

             Trig (test code = Trig) 76                                     



[facility]-10-14 12:33:00





             Test Item    Value        Reference Range Interpretation Comments

 

             LDL (Calculated) (test code = LDL 76                               

      



             (Calculated))                                        



[facility]-10-14 12:33:00





             Test Item    Value        Reference Range Interpretation Comments

 

             CHD Risk (test code = CHD Risk) 3.5                                

    



[facility]-10-14 12:33:00





             Test Item    Value        Reference Range Interpretation Comments

 

             Non HDL Chol (test code = Non HDL Chol) 92                         

            



[facility]-10-14 12:33:00





             Test Item    Value        Reference Range Interpretation Comments

 

             Chol (test code = Chol) 129                                    



[facility]-10-14 12:33:00





             Test Item    Value        Reference Range Interpretation Comments

 

             HDL (test code = HDL) 37                                     



[facility]-10-14 12:33:00





             Test Item    Value        Reference Range Interpretation Comments

 

             Trig (test code = Trig) 76                                     



[facility]-10-14 12:33:00





             Test Item    Value        Reference Range Interpretation Comments

 

             LDL (Calculated) (test code = LDL 76                               

      



             (Calculated))                                        



[facility]-10-14 12:33:00





             Test Item    Value        Reference Range Interpretation Comments

 

             CHD Risk (test code = CHD Risk) 3.5                                

    



[facility]2020-10-14 12:33:00





             Test Item    Value        Reference Range Interpretation Comments

 

             Non HDL Chol (test code = Non HDL Chol) 92                         

            



[facility]-10-14 12:33:00





             Test Item    Value        Reference Range Interpretation Comments

 

             Chol (test code = Chol) 129                                    



[facility]-10-14 12:33:00





             Test Item    Value        Reference Range Interpretation Comments

 

             HDL (test code = HDL) 37                                     



[facility]-10-14 12:33:00





             Test Item    Value        Reference Range Interpretation Comments

 

             Trig (test code = Trig) 76                                     



[facility]2020-10-14 12:33:00





             Test Item    Value        Reference Range Interpretation Comments

 

             LDL (Calculated) (test code = LDL 76                               

      



             (Calculated))                                        



[facility]2020-10-14 12:33:00





             Test Item    Value        Reference Range Interpretation Comments

 

             CHD Risk (test code = CHD Risk) 3.5                                

    



[facility]2020-10-14 12:33:00





             Test Item    Value        Reference Range Interpretation Comments

 

             Non HDL Chol (test code = Non HDL Chol) 92                         

            



[facility]2020-10-14 12:33:00





             Test Item    Value        Reference Range Interpretation Comments

 

             Chol (test code = Chol) 129                                    



[facility]2020-10-14 12:33:00





             Test Item    Value        Reference Range Interpretation Comments

 

             HDL (test code = HDL) 37                                     



[facility]-10-14 12:33:00





             Test Item    Value        Reference Range Interpretation Comments

 

             Trig (test code = Trig) 76                                     



[facility]-10-14 12:33:00





             Test Item    Value        Reference Range Interpretation Comments

 

             LDL (Calculated) (test code = LDL 76                               

      



             (Calculated))                                        



[facility]2020-10-14 12:33:00





             Test Item    Value        Reference Range Interpretation Comments

 

             CHD Risk (test code = CHD Risk) 3.5                                

    



[facility]





             Test Item    Value        Reference Range Interpretation Comments

 

             Non HDL Chol (test code = Non HDL Chol) 92                         

            



[facility]2020-10-14 12:33:00





             Test Item    Value        Reference Range Interpretation Comments

 

             Chol (test code = Chol) 129                                    



[facility]-10-14 12:33:00





             Test Item    Value        Reference Range Interpretation Comments

 

             HDL (test code = HDL) 37                                     



[facility]2020-10-14 12:33:00





             Test Item    Value        Reference Range Interpretation Comments

 

             Trig (test code = Trig) 76                                     



[facility]-10-14 12:33:00





             Test Item    Value        Reference Range Interpretation Comments

 

             LDL (Calculated) (test code = LDL 76                               

      



             (Calculated))                                        



[facility]2020-10-14 12:33:00





             Test Item    Value        Reference Range Interpretation Comments

 

             CHD Risk (test code = CHD Risk) 3.5                                

    



[facility]





             Test Item    Value        Reference Range Interpretation Comments

 

             Non HDL Chol (test code = Non HDL Chol) 92                         

            



[facility]2020-10-14 12:33:00





             Test Item    Value        Reference Range Interpretation Comments

 

             Chol (test code = Chol) 129                                    



[facility]-10-14 12:33:00





             Test Item    Value        Reference Range Interpretation Comments

 

             HDL (test code = HDL) 37                                     



[facility]-10-14 12:33:00





             Test Item    Value        Reference Range Interpretation Comments

 

             Trig (test code = Trig) 76                                     



[facility]-10-14 12:33:00





             Test Item    Value        Reference Range Interpretation Comments

 

             LDL (Calculated) (test code = LDL 76                               

      



             (Calculated))                                        



[facility]-10-14 12:33:00





             Test Item    Value        Reference Range Interpretation Comments

 

             CHD Risk (test code = CHD Risk) 3.5                                

    



[facility]-10-14 12:33:00





             Test Item    Value        Reference Range Interpretation Comments

 

             Non HDL Chol (test code = Non HDL Chol) 92                         

            



[facility]-10-14 12:33:00





             Test Item    Value        Reference Range Interpretation Comments

 

             Chol (test code = Chol) 129                                    



[facility]-10-14 12:33:00





             Test Item    Value        Reference Range Interpretation Comments

 

             HDL (test code = HDL) 37                                     



[facility]-10-14 12:33:00





             Test Item    Value        Reference Range Interpretation Comments

 

             Trig (test code = Trig) 76                                     



[facility]-10-14 12:33:00





             Test Item    Value        Reference Range Interpretation Comments

 

             LDL (Calculated) (test code = LDL 76                               

      



             (Calculated))                                        



[facility]-10-14 12:33:00





             Test Item    Value        Reference Range Interpretation Comments

 

             CHD Risk (test code = CHD Risk) 3.5                                

    



[facility]2020-10-14 12:33:00





             Test Item    Value        Reference Range Interpretation Comments

 

             Non HDL Chol (test code = Non HDL Chol) 92                         

            



[facility]-10-14 12:33:00





             Test Item    Value        Reference Range Interpretation Comments

 

             Chol (test code = Chol) 129                                    



[facility]-10-14 12:33:00





             Test Item    Value        Reference Range Interpretation Comments

 

             HDL (test code = HDL) 37                                     



[facility]-10-14 12:33:00





             Test Item    Value        Reference Range Interpretation Comments

 

             Trig (test code = Trig) 76                                     



[facility]2020-10-14 12:33:00





             Test Item    Value        Reference Range Interpretation Comments

 

             LDL (Calculated) (test code = LDL 76                               

      



             (Calculated))                                        



[facility]





             Test Item    Value        Reference Range Interpretation Comments

 

             CHD Risk (test code = CHD Risk) 3.5                                

    



[facility]





             Test Item    Value        Reference Range Interpretation Comments

 

             Non HDL Chol (test code = Non HDL Chol) 92                         

            



[place] 268880-08-06 14:47:00





             Test Item    Value        Reference Range Interpretation Comments

 

             Chloride Lvl (test code = Chloride Lvl) 102                  

            



[place] 268880-08-06 14:47:00





             Test Item    Value        Reference Range Interpretation Comments

 

             CO2 (test code = CO2) 30           20-32                     



[place] 268880-08-06 14:47:00





             Test Item    Value        Reference Range Interpretation Comments

 

             Calcium Lvl (test code = Calcium Lvl) 9.4          8.6-10.4        

          



[place] 268880-08-06 14:47:00





             Test Item    Value        Reference Range Interpretation Comments

 

             Phosphorus (test code = Phosphorus) 4.8          2.5-4.5           

        



[place] 268880-08-06 14:47:00





             Test Item    Value        Reference Range Interpretation Comments

 

             Albumin Lvl (test code = Albumin Lvl) 3.9          3.6-5.1         

          



[place] 699610-08-06 14:47:00





             Test Item    Value        Reference Range Interpretation Comments

 

             Plt Count Estimated (test code = DECREASED                         

     



             Plt Count Estimated)                                        



[place] 699610-08-06 14:47:00





             Test Item    Value        Reference Range Interpretation Comments

 

             WBC X 10x3 (test code = WBC X 10x3) 7.2          3.8-10.8          

        



[place] 24308-08-06 14:47:00





             Test Item    Value        Reference Range Interpretation Comments

 

             RBC X 10x6 (test code = RBC X 10x6) 3.71         3.80-5.10         

        



[place] [address]308-08-06 14:47:00





             Test Item    Value        Reference Range Interpretation Comments

 

             Hgb (test code = Hgb) 10.7         11.7-15.5                 



[place] [address]308-08-06 14:47:00





             Test Item    Value        Reference Range Interpretation Comments

 

             Hct (test code = Hct) 33.9         35.0-45.0                 



[place] 699610-08-06 14:47:00





             Test Item    Value        Reference Range Interpretation Comments

 

             MCV (test code = MCV) 91.4         80.0-100.0                



[id number]





             Test Item    Value        Reference Range Interpretation Comments

 

             MCH (test code = MCH) 28.8 pg      27.0-33.0                 



[facility]115[facility] 14:47:00





             Test Item    Value        Reference Range Interpretation Comments

 

             MCHC (test code = MCHC) 31.6         32.0-36.0                 



[place] 699610-08-06 14:47:00





             Test Item    Value        Reference Range Interpretation Comments

 

             RDW (test code = RDW) 13.9         11.0-15.0                 



[place] 699610-08-06 14:47:00





             Test Item    Value        Reference Range Interpretation Comments

 

             Platelet (test code = Platelet) 110          140-400               

    



[facility]bim[facility]hKUTSLLENXI2424-27-[facility] 14:47:00





             Test Item    Value        Reference Range Interpretation Comments

 

             MPV (test code = MPV) 11.7         7.5-12.5                  



[facility]LwmsqfjMEOHHATQHU2453-41-05 14:47:00





             Test Item    Value        Reference Range Interpretation Comments

 

             Neutrophils # (test code = Neutrophils 5414         0310-2562      

           



             #)                                                  



[facility]oundYdaaouxPCMSQFUMSR2354-83-60 14:47:00





             Test Item    Value        Reference Range Interpretation Comments

 

             Lymphocytes # (test code = Lymphocytes 1152         850-3900       

           



             #)                                                  



[facility]XudkcttARUFFFBURQ2040-96-11 14:47:00





             Test Item    Value        Reference Range Interpretation Comments

 

             Monocytes # (test code = Monocytes #) 461          200-950         

          



[facility]oundCluawczDNBVAQOVCI4115-96-40 14:47:00





             Test Item    Value        Reference Range Interpretation Comments

 

             Eosinophils # (test code = Eosinophils 122                   

           



             #)                                                  



[place] 699610-08-06 14:47:00





             Test Item    Value        Reference Range Interpretation Comments

 

             Basophils # (test code 50           See_Comment                [Aut

omated message] The



             = Basophils #)                                        system which 

generated



                                                                 this result tra

nsmitted



                                                                 reference range

: <=200.



                                                                 The reference r

cheyenne was



                                                                 not used to int

erpret



                                                                 this result as



                                                                 normal/abnormal

.



[facility]oundMyfknrqQEBMBSPYIN3609-50-93 14:47:00





             Test Item    Value        Reference Range Interpretation Comments

 

             Segs (test code = Segs) 75.2                                   



[facility]oundQjyortvFDMBVAHBQM3501-23-83 14:47:00





             Test Item    Value        Reference Range Interpretation Comments

 

             Lymphocytes (test code = Lymphocytes) 16.0                         

          



[facility] ClinicRawwtmbGNNCMBDDTJ8132-16-36 14:47:00





             Test Item    Value        Reference Range Interpretation Comments

 

             Monocytes (test code = Monocytes) 6.4                              

      



[facility]ewkaiiIYJGSJTEPW9996-64-70 14:47:00





             Test Item    Value        Reference Range Interpretation Comments

 

             Eosinophils (test code = Eosinophils) 1.7                          

          



[facility] ClinicXgsdxhmWYHQENLNBT7705-47-98 14:47:00





             Test Item    Value        Reference Range Interpretation Comments

 

             Basophils (test code = Basophils) 0.7                              

      



[facility] [id number] 14:47:00





             Test Item    Value        Reference Range Interpretation Comments

 

             Result 2 (Urine Culture) See Result Comment                        

   



             (test code = Result 2                                        



             (Urine Culture))                                        



[facility] AND [id number] 14:47:00





             Test Item    Value        Reference Range Interpretation Comments

 

             UA Color (test code = UA Color) YELLOW                             

    



[facility] AND [id number] 14:47:00





             Test Item    Value        Reference Range Interpretation Comments

 

             UA Turbidity (test code = UA CLOUDY                                

 



             Turbidity)                                          



[facility] AND [id number] 14:47:00





             Test Item    Value        Reference Range Interpretation Comments

 

             UA Spec Grav (test code = UA Spec 1.017 1      1.001-1.035         

      



             Grav)                                               



[facility] AND [id number] 14:47:00





             Test Item    Value        Reference Range Interpretation Comments

 

             UA pH (test code = UA pH) 5.5 1        5.0-8.0                   



[facility] AND [id number] 14:47:00





             Test Item    Value        Reference Range Interpretation Comments

 

             UA Glucose (test code = UA Glucose) NEGATIVE                       

        



[facility] AND [id number] 14:47:00





             Test Item    Value        Reference Range Interpretation Comments

 

             UA Bili (test code = UA Bili) NEGATIVE                             

  



[facility] AND [id number] 14:47:00





             Test Item    Value        Reference Range Interpretation Comments

 

             UA Ketones (test code = UA Ketones) NEGATIVE                       

        



[facility] AND [id number] 14:47:00





             Test Item    Value        Reference Range Interpretation Comments

 

             UA Blood (test code = UA Blood) 1+                                 

    



[facility] AND [id number] 14:47:00





             Test Item    Value        Reference Range Interpretation Comments

 

             UA Protein (test code = UA Protein) 1+                             

        



[facility] AND [id number] 14:47:00





             Test Item    Value        Reference Range Interpretation Comments

 

             UA Nitrite (test code = UA Nitrite) POSITIVE                       

        



[facility] [id number] 14:47:00





             Test Item    Value        Reference Range Interpretation Comments

 

             UA Leuk Est (test code = UA Leuk Est) 2+                           

          



[facility] [id number] 14:47:00





             Test Item    Value        Reference Range Interpretation Comments

 

             UA WBC (test code = UA WBC) > OR = 60                              



[facility] [id number] 14:47:00





             Test Item    Value        Reference Range Interpretation Comments

 

             UA RBC (test code = UA RBC) 0-2                                    



[facility] [id number] 14:47:00





             Test Item    Value        Reference Range Interpretation Comments

 

             UA Sq Epi (test code = UA Sq Epi) NONE SEEN                        

      



[facility] [id number] 14:47:00





             Test Item    Value        Reference Range Interpretation Comments

 

             UA Bacteria (test code = UA Bacteria) MANY                         

          



[facility] [id number] 14:47:00





             Test Item    Value        Reference Range Interpretation Comments

 

             UA Hyal Cast (test code = UA Hyal NONE SEEN                        

      



             Cast)                                               



[facility] [id number] 14:47:00





             Test Item    Value        Reference Range Interpretation Comments

 

             UA Reflex (test code CULTURE INDICATED -                           



             = UA Reflex) RESULTS TO FOLLOW                           



[facility] [id number] 14:47:00





             Test Item    Value        Reference Range Interpretation Comments

 

             U Creat mg/dL (test code = U Creat 114                       

       



             mg/dL)                                              



[facility] [id number] 14:47:00





             Test Item    Value        Reference Range Interpretation Comments

 

             U Alb (test code = U Alb) 16.7                                   



[facility] [id number] 14:47:00





             Test Item    Value        Reference Range Interpretation Comments

 

             U Alb/Crea (test code = U Alb/Crea) 146                            

        



[facility] [id number] 14:47:00





             Test Item    Value        Reference Range Interpretation Comments

 

             Vitamin D, 25-OH, Total (test code = 37                      

         



             Vitamin D, 25-OH, Total)                                        



[facility] [id number] 14:47:00





             Test Item    Value        Reference Range Interpretation Comments

 

             U Creat mg/dL (test code = U Creat 114                       

       



             mg/dL)                                              



[place] 268880-08-06 14:47:00





             Test Item    Value        Reference Range Interpretation Comments

 

             U Prot/Creat (test code = U Prot/Creat) 430                  

            



[place] 268880-08-06 14:47:00





             Test Item    Value        Reference Range Interpretation Comments

 

             U Prot/Creat (test code = U 0.430 1      0.021-0.161               



             Prot/Creat)                                         



[place] 268880-08-06 14:47:00





             Test Item    Value        Reference Range Interpretation Comments

 

             U Protein (test code = U Protein) 49           5-24                

      



[place] 268880-08-06 14:47:00





             Test Item    Value        Reference Range Interpretation Comments

 

             Glucose Lvl (test code = Glucose Lvl) 103          65-99           

          



[place] 268880-08-06 14:47:00





             Test Item    Value        Reference Range Interpretation Comments

 

             BUN (test code = BUN) 24           7-25                      



[place] 268880-08-06 14:47:00





             Test Item    Value        Reference Range Interpretation Comments

 

             Creatinine Lvl (test code = Creatinine 1.32         0.50-0.99      

           



             Lvl)                                                



[facility]2020-08-06 14:47:00





             Test Item    Value        Reference Range Interpretation Comments

 

             eGFR NON-AFR. AMERICAN (test code = 43                             

        



             eGFR NON-AFR. AMERICAN)                                        



[facility]2020-08-06 14:47:00





             Test Item    Value        Reference Range Interpretation Comments

 

             eGFR  (test code = eGFR 50                         

            



             )                                        



[place] 268880-08-06 14:47:00





             Test Item    Value        Reference Range Interpretation Comments

 

             B/C Ratio (test code = B/C Ratio) 18           6-22                

      



[facility]2020-08-06 14:47:00





             Test Item    Value        Reference Range Interpretation Comments

 

             Sodium Lvl (test code = Sodium Lvl) 138          135-146           

        



[place] 268880-08-06 14:47:00





             Test Item    Value        Reference Range Interpretation Comments

 

             Potassium Lvl (test code = Potassium 4.4          3.5-5.3          

         



             Lvl)                                                



[place] 268880-08-06 14:47:00





             Test Item    Value        Reference Range Interpretation Comments

 

             Chloride Lvl (test code = Chloride Lvl) 102                  

            



[place] 268880-08-06 14:47:00





             Test Item    Value        Reference Range Interpretation Comments

 

             CO2 (test code = CO2) 30           20-32                     



[facility]2020-08-06 14:47:00





             Test Item    Value        Reference Range Interpretation Comments

 

             Calcium Lvl (test code = Calcium Lvl) 9.4          8.6-10.4        

          



[place] 268880-08-06 14:47:00





             Test Item    Value        Reference Range Interpretation Comments

 

             Phosphorus (test code = Phosphorus) 4.8          2.5-4.5           

        



[facility]2020-08-06 14:47:00





             Test Item    Value        Reference Range Interpretation Comments

 

             Albumin Lvl (test code = Albumin Lvl) 3.9          3.6-5.1         

          



[place] 699610-08-06 14:47:00





             Test Item    Value        Reference Range Interpretation Comments

 

             Plt Count Estimated (test code = DECREASED                         

     



             Plt Count Estimated)                                        



[place] 699610-08-06 14:47:00





             Test Item    Value        Reference Range Interpretation Comments

 

             WBC X 10x3 (test code = WBC X 10x3) 7.2          3.8-10.8          

        



[place] [address]-08-06 14:47:00





             Test Item    Value        Reference Range Interpretation Comments

 

             RBC X 10x6 (test code = RBC X 10x6) 3.71         3.80-5.10         

        



[place] [address]-08-06 14:47:00





             Test Item    Value        Reference Range Interpretation Comments

 

             Hgb (test code = Hgb) 10.7         11.7-15.5                 



[place] [address]-08-06 14:47:00





             Test Item    Value        Reference Range Interpretation Comments

 

             Hct (test code = Hct) 33.9         35.0-45.0                 



[place] [address]-08-06 14:47:00





             Test Item    Value        Reference Range Interpretation Comments

 

             MCV (test code = MCV) 91.4         80.0-100.0                



[place] [address]-08-06 14:47:00





             Test Item    Value        Reference Range Interpretation Comments

 

             MCH (test code = MCH) 28.8 pg      27.0-33.0                 



[place] [address]-08-06 14:47:00





             Test Item    Value        Reference Range Interpretation Comments

 

             MCHC (test code = MCHC) 31.6         32.0-36.0                 



[place] [address]-08-06 14:47:00





             Test Item    Value        Reference Range Interpretation Comments

 

             RDW (test code = RDW) 13.9         11.0-15.0                 



[facility]djhegiNMBQSCEZXS9003-39-10 14:47:00





             Test Item    Value        Reference Range Interpretation Comments

 

             Platelet (test code = Platelet) 110          140-400               

    



[facility] HospitalDizxcmiCITGXVIIAK2113-86-99 14:47:00





             Test Item    Value        Reference Range Interpretation Comments

 

             MPV (test code = MPV) 11.7         7.5-12.5                  



[facility] HospitalVaegwpxOIZPOHIXBN3931-31-62 14:47:00





             Test Item    Value        Reference Range Interpretation Comments

 

             Neutrophils # (test code = Neutrophils 5414         2156-2318      

           



             #)                                                  



[facility] HospitalOkhnnzdPFIBFLZQWP1825-04-17 14:47:00





             Test Item    Value        Reference Range Interpretation Comments

 

             Lymphocytes # (test code = Lymphocytes 1152         850-3900       

           



             #)                                                  



[facility] HospitalEmkrpjlXEXIKWBQZG0721-63-89 14:47:00





             Test Item    Value        Reference Range Interpretation Comments

 

             Monocytes # (test code = Monocytes #) 461          200-950         

          



[facility] HospitalAodnnwvTBAPNGSMAR8610-26-06 14:47:00





             Test Item    Value        Reference Range Interpretation Comments

 

             Eosinophils # (test code = Eosinophils 122                   

           



             #)                                                  



[facility] HospitalUznxkvlWVQGPGDKUB6272-88-39 14:47:00





             Test Item    Value        Reference Range Interpretation Comments

 

             Basophils # (test code 50           See_Comment                [Aut

omated message] The



             = Basophils #)                                        system which 

generated



                                                                 this result tra

nsmitted



                                                                 reference range

: <=200.



                                                                 The reference r

cheyenne was



                                                                 not used to int

erpret



                                                                 this result as



                                                                 normal/abnormal

.



[facility]GzztgtdPQKMNTLZRR9440-91-01 14:47:00





             Test Item    Value        Reference Range Interpretation Comments

 

             Segs (test code = Segs) 75.2                                   



[facility] HospitalSgtvehdUIRTXOYKLB8858-36-21 14:47:00





             Test Item    Value        Reference Range Interpretation Comments

 

             Lymphocytes (test code = Lymphocytes) 16.0                         

          



[facility] HospitalLmhncmaIXFQPSCTYJ1045-67-36 14:47:00





             Test Item    Value        Reference Range Interpretation Comments

 

             Monocytes (test code = Monocytes) 6.4                              

      



[facility] HospitalDqkdpknEASKXPSWBK2061-38-91 14:47:00





             Test Item    Value        Reference Range Interpretation Comments

 

             Eosinophils (test code = Eosinophils) 1.7                          

          



[facility] HospitalLbtkdueEEARFALTWM5128-67-98 14:47:00





             Test Item    Value        Reference Range Interpretation Comments

 

             Basophils (test code = Basophils) 0.7                              

      



[facility] [id number] 14:47:00





             Test Item    Value        Reference Range Interpretation Comments

 

             Result 2 (Urine Culture) See Result Comment                        

   



             (test code = Result 2                                        



             (Urine Culture))                                        



[facility] AND [id number] 14:47:00





             Test Item    Value        Reference Range Interpretation Comments

 

             UA Color (test code = UA Color) YELLOW                             

    



[facility] AND [id number] 14:47:00





             Test Item    Value        Reference Range Interpretation Comments

 

             UA Turbidity (test code = UA CLOUDY                                

 



             Turbidity)                                          



[facility] AND [id number] 14:47:00





             Test Item    Value        Reference Range Interpretation Comments

 

             UA Spec Grav (test code = UA Spec 1.017 1      1.001-1.035         

      



             Grav)                                               



[facility] AND [id number] 14:47:00





             Test Item    Value        Reference Range Interpretation Comments

 

             UA pH (test code = UA pH) 5.5 1        5.0-8.0                   



[facility] AND [id number] 14:47:00





             Test Item    Value        Reference Range Interpretation Comments

 

             UA Glucose (test code = UA Glucose) NEGATIVE                       

        



[facility] AND [id number] 14:47:00





             Test Item    Value        Reference Range Interpretation Comments

 

             UA Bili (test code = UA Bili) NEGATIVE                             

  



[facility] AND [id number] 14:47:00





             Test Item    Value        Reference Range Interpretation Comments

 

             UA Ketones (test code = UA Ketones) NEGATIVE                       

        



[facility] AND [id number] 14:47:00





             Test Item    Value        Reference Range Interpretation Comments

 

             UA Blood (test code = UA Blood) 1+                                 

    



[facility] AND [id number] 14:47:00





             Test Item    Value        Reference Range Interpretation Comments

 

             UA Protein (test code = UA Protein) 1+                             

        



[facility] AND [id number] 14:47:00





             Test Item    Value        Reference Range Interpretation Comments

 

             UA Nitrite (test code = UA Nitrite) POSITIVE                       

        



[facility] AND [id number] 14:47:00





             Test Item    Value        Reference Range Interpretation Comments

 

             UA Leuk Est (test code = UA Leuk Est) 2+                           

          



[facility] AND [id number] 14:47:00





             Test Item    Value        Reference Range Interpretation Comments

 

             UA WBC (test code = UA WBC) > OR = 60                              



[facility] AND [id number] 14:47:00





             Test Item    Value        Reference Range Interpretation Comments

 

             UA RBC (test code = UA RBC) 0-2                                    



[facility] AND [id number] 14:47:00





             Test Item    Value        Reference Range Interpretation Comments

 

             UA Sq Epi (test code = UA Sq Epi) NONE SEEN                        

      



[facility] AND [id number] 14:47:00





             Test Item    Value        Reference Range Interpretation Comments

 

             UA Bacteria (test code = UA Bacteria) MANY                         

          



[facility] AND [id number] 14:47:00





             Test Item    Value        Reference Range Interpretation Comments

 

             UA Hyal Cast (test code = UA Hyal NONE SEEN                        

      



             Cast)                                               



[facility] AND [id number] 14:47:00





             Test Item    Value        Reference Range Interpretation Comments

 

             UA Reflex (test code CULTURE INDICATED -                           



             = UA Reflex) RESULTS TO FOLLOW                           



[facility]2020-08-06 14:47:00





             Test Item    Value        Reference Range Interpretation Comments

 

             U Creat mg/dL (test code = U Creat 114                       

       



             mg/dL)                                              



[place] 508090-08-06 14:47:00





             Test Item    Value        Reference Range Interpretation Comments

 

             U Alb (test code = U Alb) 16.7                                   



[place] 508090-08-06 14:47:00





             Test Item    Value        Reference Range Interpretation Comments

 

             U Alb/Crea (test code = U Alb/Crea) 146                            

        



[facility]2020-08-06 14:47:00





             Test Item    Value        Reference Range Interpretation Comments

 

             Vitamin D, 25-OH, Total (test code = 37                      

         



             Vitamin D, 25-OH, Total)                                        



[place] 268880-08-06 14:47:00





             Test Item    Value        Reference Range Interpretation Comments

 

             U Creat mg/dL (test code = U Creat 114                       

       



             mg/dL)                                              



[place] 268880-08-06 14:47:00





             Test Item    Value        Reference Range Interpretation Comments

 

             U Prot/Creat (test code = U Prot/Creat) 430                  

            



[place] 268880-08-06 14:47:00





             Test Item    Value        Reference Range Interpretation Comments

 

             U Prot/Creat (test code = U 0.430 1      0.021-0.161               



             Prot/Creat)                                         



[place] 268880-08-06 14:47:00





             Test Item    Value        Reference Range Interpretation Comments

 

             U Protein (test code = U Protein) 49           5-24                

      



[place] 268880-08-06 14:47:00





             Test Item    Value        Reference Range Interpretation Comments

 

             Glucose Lvl (test code = Glucose Lvl) 103          65-99           

          



[place] 268880-08-06 14:47:00





             Test Item    Value        Reference Range Interpretation Comments

 

             BUN (test code = BUN) 24           7-25                      



[place] 268880-08-06 14:47:00





             Test Item    Value        Reference Range Interpretation Comments

 

             Creatinine Lvl (test code = Creatinine 1.32         0.50-0.99      

           



             Lvl)                                                



[facility]2020-08-06 14:47:00





             Test Item    Value        Reference Range Interpretation Comments

 

             eGFR NON-AFR. AMERICAN (test code = 43                             

        



             eGFR NON-AFR. AMERICAN)                                        



[facility]2020-08-06 14:47:00





             Test Item    Value        Reference Range Interpretation Comments

 

             eGFR  (test code = eGFR 50                         

            



             )                                        



[place] 268880-08-06 14:47:00





             Test Item    Value        Reference Range Interpretation Comments

 

             B/C Ratio (test code = B/C Ratio) 18           6-22                

      



[place] 268880-08-06 14:47:00





             Test Item    Value        Reference Range Interpretation Comments

 

             Sodium Lvl (test code = Sodium Lvl) 138          135-146           

        



[facility]2020-08-06 14:47:00





             Test Item    Value        Reference Range Interpretation Comments

 

             Potassium Lvl (test code = Potassium 4.4          3.5-5.3          

         



             Lvl)                                                



[facility]2020-08-06 14:47:00





             Test Item    Value        Reference Range Interpretation Comments

 

             Chloride Lvl (test code = Chloride Lvl) 102                  

            



[place] 268880-08-06 14:47:00





             Test Item    Value        Reference Range Interpretation Comments

 

             CO2 (test code = CO2) 30           20-32                     



[place] 268880-08-06 14:47:00





             Test Item    Value        Reference Range Interpretation Comments

 

             Calcium Lvl (test code = Calcium Lvl) 9.4          8.6-10.4        

          



[place] 268880-08-06 14:47:00





             Test Item    Value        Reference Range Interpretation Comments

 

             Phosphorus (test code = Phosphorus) 4.8          2.5-4.5           

        



[place] 268880-08-06 14:47:00





             Test Item    Value        Reference Range Interpretation Comments

 

             Albumin Lvl (test code = Albumin Lvl) 3.9          3.6-5.1         

          



[facility]





             Test Item    Value        Reference Range Interpretation Comments

 

             Plt Count Estimated (test code = DECREASED                         

     



             Plt Count Estimated)                                        



[facility]dlmsgrJDZGACDKIT1540-49-97 14:47:00





             Test Item    Value        Reference Range Interpretation Comments

 

             WBC X 10x3 (test code = WBC X 10x3) 7.2          3.8-10.8          

        



[place] 699610-08-06 14:47:00





             Test Item    Value        Reference Range Interpretation Comments

 

             RBC X 10x6 (test code = RBC X 10x6) 3.71         3.80-5.10         

        



[place] 699610-08-06 14:47:00





             Test Item    Value        Reference Range Interpretation Comments

 

             Hgb (test code = Hgb) 10.7         11.7-15.5                 



[facility]WzjkaqzFOMJAKACKJ2778-69-81 14:47:00





             Test Item    Value        Reference Range Interpretation Comments

 

             Hct (test code = Hct) 33.9         35.0-45.0                 



[facility]RyzcbocLENIKCESTB3948-64-57 14:47:00





             Test Item    Value        Reference Range Interpretation Comments

 

             MCV (test code = MCV) 91.4         80.0-100.0                



[facility]VvgmtrnPJYTMJPLER0166-78-34 14:47:00





             Test Item    Value        Reference Range Interpretation Comments

 

             MCH (test code = MCH) 28.8 pg      27.0-33.0                 



[facility]AgwsuabGHVKIBUEMS6210-75-60 14:47:00





             Test Item    Value        Reference Range Interpretation Comments

 

             MCHC (test code = MCHC) 31.6         32.0-36.0                 



[facility]oundJqybtlwCTTBWBKUFJ7830-34-34 14:47:00





             Test Item    Value        Reference Range Interpretation Comments

 

             RDW (test code = RDW) 13.9         11.0-15.0                 



[place] 699610-08-06 14:47:00





             Test Item    Value        Reference Range Interpretation Comments

 

             Platelet (test code = Platelet) 110          140-400               

    



[place] 699610-08-06 14:47:00





             Test Item    Value        Reference Range Interpretation Comments

 

             MPV (test code = MPV) 11.7         7.5-12.5                  



[facility]CdxoyiuWGOTVRSRUJ2709-95-50 14:47:00





             Test Item    Value        Reference Range Interpretation Comments

 

             Neutrophils # (test code = Neutrophils 5447         6047-2461      

           



             #)                                                  



[facility]JDCDZW0085[facility] 14:47:00





             Test Item    Value        Reference Range Interpretation Comments

 

             Lymphocytes # (test code = Lymphocytes 1152         850-3900       

           



             #)                                                  



[facility] HospitalDetwjalYIMTQSWAGU7663-43-01 14:47:00





             Test Item    Value        Reference Range Interpretation Comments

 

             Monocytes # (test code = Monocytes #) 461          200-950         

          



[facility]fdTUZMAEKCRZ3675[facility] 14:47:00





             Test Item    Value        Reference Range Interpretation Comments

 

             Eosinophils # (test code = Eosinophils 122                   

           



             #)                                                  



[facility] HospitalJsqfvfwYCEEXDRXLW6742-32-04 14:47:00





             Test Item    Value        Reference Range Interpretation Comments

 

             Basophils # (test code 50           See_Comment                [Aut

omated message] The



             = Basophils #)                                        system which 

generated



                                                                 this result tra

nsmitted



                                                                 reference range

: <=200.



                                                                 The reference r

cheyenne was



                                                                 not used to int

erpret



                                                                 this result as



                                                                 normal/abnormal

.



[facility] HospitalHxvlntgSSDZZHDKCO7752-92-77 14:47:00





             Test Item    Value        Reference Range Interpretation Comments

 

             Segs (test code = Segs) 75.2                                   



[facility] HospitalLqicqfxEJASDMLFDZ2404-63-77 14:47:00





             Test Item    Value        Reference Range Interpretation Comments

 

             Lymphocytes (test code = Lymphocytes) 16.0                         

          



[facility] HospitalXwtbvkiYCAKRNSEUL9898-61-36 14:47:00





             Test Item    Value        Reference Range Interpretation Comments

 

             Monocytes (test code = Monocytes) 6.4                              

      



[facility] HospitalVcydgntPYQNKVDTDR2965-58-60 14:47:00





             Test Item    Value        Reference Range Interpretation Comments

 

             Eosinophils (test code = Eosinophils) 1.7                          

          



[facility] ClinicPsqoumqJWTDOMQQOC5323-69-13 14:47:00





             Test Item    Value        Reference Range Interpretation Comments

 

             Basophils (test code = Basophils) 0.7                              

      



[facility] [id number] 14:47:00





             Test Item    Value        Reference Range Interpretation Comments

 

             Result 2 (Urine Culture) See Result Comment                        

   



             (test code = Result 2                                        



             (Urine Culture))                                        



[facility] [id number] 14:47:00





             Test Item    Value        Reference Range Interpretation Comments

 

             UA Color (test code = UA Color) YELLOW                             

    



[facility] AND [id number] 14:47:00





             Test Item    Value        Reference Range Interpretation Comments

 

             UA Turbidity (test code = UA CLOUDY                                

 



             Turbidity)                                          



[facility] AND [id number] 14:47:00





             Test Item    Value        Reference Range Interpretation Comments

 

             UA Spec Grav (test code = UA Spec 1.017 1      1.001-1.035         

      



             Grav)                                               



[facility] [id number]47[facility]





             Test Item    Value        Reference Range Interpretation Comments

 

             UA pH (test code = UA pH) 5.5 1        5.0-8.0                   



[facility] [id number] 14:47:00





             Test Item    Value        Reference Range Interpretation Comments

 

             UA Glucose (test code = UA Glucose) NEGATIVE                       

        



[facility] [id number] 14:47:00





             Test Item    Value        Reference Range Interpretation Comments

 

             UA Bili (test code = UA Bili) NEGATIVE                             

  



[facility] [id number] 14:47:00





             Test Item    Value        Reference Range Interpretation Comments

 

             UA Ketones (test code = UA Ketones) NEGATIVE                       

        



[facility] [id number] 14:47:00





             Test Item    Value        Reference Range Interpretation Comments

 

             UA Blood (test code = UA Blood) 1+                                 

    



[facility] [id number] 14:47:00





             Test Item    Value        Reference Range Interpretation Comments

 

             UA Protein (test code = UA Protein) 1+                             

        



[facility] [id number] 14:47:00





             Test Item    Value        Reference Range Interpretation Comments

 

             UA Nitrite (test code = UA Nitrite) POSITIVE                       

        



[facility] [id number] 14:47:00





             Test Item    Value        Reference Range Interpretation Comments

 

             UA Leuk Est (test code = UA Leuk Est) 2+                           

          



[facility] [id number] 14:47:00





             Test Item    Value        Reference Range Interpretation Comments

 

             UA WBC (test code = UA WBC) > OR = 60                              



[facility] [id number] 14:47:00





             Test Item    Value        Reference Range Interpretation Comments

 

             UA RBC (test code = UA RBC) 0-2                                    



[facility] [id number] 14:47:00





             Test Item    Value        Reference Range Interpretation Comments

 

             UA Sq Epi (test code = UA Sq Epi) NONE SEEN                        

      



[facility] [id number] 14:47:00





             Test Item    Value        Reference Range Interpretation Comments

 

             UA Bacteria (test code = UA Bacteria) MANY                         

          



[facility] [id number] 14:47:00





             Test Item    Value        Reference Range Interpretation Comments

 

             UA Hyal Cast (test code = UA Hyal NONE SEEN                        

      



             Cast)                                               



[facility] [id number] 14:47:00





             Test Item    Value        Reference Range Interpretation Comments

 

             UA Reflex (test code CULTURE INDICATED -                           



             = UA Reflex) RESULTS TO FOLLOW                           



[place] 508090-08-06 14:47:00





             Test Item    Value        Reference Range Interpretation Comments

 

             U Creat mg/dL (test code = U Creat 114                       

       



             mg/dL)                                              



[place] 508090-08-06 14:47:00





             Test Item    Value        Reference Range Interpretation Comments

 

             U Alb (test code = U Alb) 16.7                                   



[place] [address]-08-06 14:47:00





             Test Item    Value        Reference Range Interpretation Comments

 

             U Alb/Crea (test code = U Alb/Crea) 146                            

        



[place] 268880-08-06 14:47:00





             Test Item    Value        Reference Range Interpretation Comments

 

             Vitamin D, 25-OH, Total (test code = 37                      

         



             Vitamin D, 25-OH, Total)                                        



[place] 268880-08-06 14:47:00





             Test Item    Value        Reference Range Interpretation Comments

 

             U Creat mg/dL (test code = U Creat 114                       

       



             mg/dL)                                              



[place] [address]-08-06 14:47:00





             Test Item    Value        Reference Range Interpretation Comments

 

             U Prot/Creat (test code = U Prot/Creat) 430                  

            



[facility]2020-08-06 14:47:00





             Test Item    Value        Reference Range Interpretation Comments

 

             U Prot/Creat (test code = U 0.430 1      0.021-0.161               



             Prot/Creat)                                         



[place] 268880-08-06 14:47:00





             Test Item    Value        Reference Range Interpretation Comments

 

             U Protein (test code = U Protein) 49           5-24                

      



[place] [address]-08-06 14:47:00





             Test Item    Value        Reference Range Interpretation Comments

 

             Glucose Lvl (test code = Glucose Lvl) 103          65-99           

          



[place] 268880-08-06 14:47:00





             Test Item    Value        Reference Range Interpretation Comments

 

             BUN (test code = BUN) 24           7-25                      



[place] 268880-08-06 14:47:00





             Test Item    Value        Reference Range Interpretation Comments

 

             Creatinine Lvl (test code = Creatinine 1.32         0.50-0.99      

           



             Lvl)                                                



[place] 268880-08-06 14:47:00





             Test Item    Value        Reference Range Interpretation Comments

 

             eGFR NON-AFR. AMERICAN (test code = 43                             

        



             eGFR NON-AFR. AMERICAN)                                        



[place] 268880-08-06 14:47:00





             Test Item    Value        Reference Range Interpretation Comments

 

             eGFR  (test code = eGFR 50                         

            



             )                                        



[place] 268880-08-06 14:47:00





             Test Item    Value        Reference Range Interpretation Comments

 

             B/C Ratio (test code = B/C Ratio) 18           6-22                

      



[place] 268880-08-06 14:47:00





             Test Item    Value        Reference Range Interpretation Comments

 

             Sodium Lvl (test code = Sodium Lvl) 138          135-146           

        



[place] [address]-08-06 14:47:00





             Test Item    Value        Reference Range Interpretation Comments

 

             Potassium Lvl (test code = Potassium 4.4          3.5-5.3          

         



             Lvl)                                                



[place] 268880-08-06 14:47:00





             Test Item    Value        Reference Range Interpretation Comments

 

             Chloride Lvl (test code = Chloride Lvl) 102                  

            



[place] 268880-08-06 14:47:00





             Test Item    Value        Reference Range Interpretation Comments

 

             CO2 (test code = CO2) 30           20-32                     



[place] 268880-08-06 14:47:00





             Test Item    Value        Reference Range Interpretation Comments

 

             Calcium Lvl (test code = Calcium Lvl) 9.4          8.6-10.4        

          



[place] 268880-08-06 14:47:00





             Test Item    Value        Reference Range Interpretation Comments

 

             Phosphorus (test code = Phosphorus) 4.8          2.5-4.5           

        



[place] 268880-08-06 14:47:00





             Test Item    Value        Reference Range Interpretation Comments

 

             Albumin Lvl (test code = Albumin Lvl) 3.9          3.6-5.1         

          



[place] [address]-08-06 14:47:00





             Test Item    Value        Reference Range Interpretation Comments

 

             Plt Count Estimated (test code = DECREASED                         

     



             Plt Count Estimated)                                        



[place] 699610-08-06 14:47:00





             Test Item    Value        Reference Range Interpretation Comments

 

             WBC X 10x3 (test code = WBC X 10x3) 7.2          3.8-10.8          

        



[place] [address]-08-06 14:47:00





             Test Item    Value        Reference Range Interpretation Comments

 

             RBC X 10x6 (test code = RBC X 10x6) 3.71         3.80-5.10         

        



[facility]accwwkARFHTFZLSO2917-92-08 14:47:00





             Test Item    Value        Reference Range Interpretation Comments

 

             Hgb (test code = Hgb) 10.7         11.7-15.5                 



[facility]oundWvseobhQKANELPQKY6263-26-87 14:47:00





             Test Item    Value        Reference Range Interpretation Comments

 

             Hct (test code = Hct) 33.9         35.0-45.0                 



[facility]oundNjvasycVPTAGQWASG8466-55-40 14:47:00





             Test Item    Value        Reference Range Interpretation Comments

 

             MCV (test code = MCV) 91.4         80.0-100.0                



[facility]oundIhoxnsdHKIIDPNREW5133-10-67 14:47:00





             Test Item    Value        Reference Range Interpretation Comments

 

             MCH (test code = MCH) 28.8 pg      27.0-33.0                 



[facility]oundXlzlyjgWHGTGULZZR6133-21-14 14:47:00





             Test Item    Value        Reference Range Interpretation Comments

 

             MCHC (test code = MCHC) 31.6         32.0-36.0                 



[facility]oundNtdfxnwTSUMLKUCJE4730-08-01 14:47:00





             Test Item    Value        Reference Range Interpretation Comments

 

             RDW (test code = RDW) 13.9         11.0-15.0                 



[facility]oundLfsglogPWBLWZECVF7882-20-99 14:47:00





             Test Item    Value        Reference Range Interpretation Comments

 

             Platelet (test code = Platelet) 110          140-400               

    



[facility]oundDrtmyziWENUPPUZAU0379-38-97 14:47:00





             Test Item    Value        Reference Range Interpretation Comments

 

             MPV (test code = MPV) 11.7         7.5-12.5                  



[facility]oundWahntbfVIMKRCDKNL7790-44-28 14:47:00





             Test Item    Value        Reference Range Interpretation Comments

 

             Neutrophils # (test code = Neutrophils 5414         9936-1659      

           



             #)                                                  



[facility]oundTzxrcgnOGGXXLMQHC8752-81-56 14:47:00





             Test Item    Value        Reference Range Interpretation Comments

 

             Lymphocytes # (test code = Lymphocytes 1152         850-3900       

           



             #)                                                  



[facility]oundGixwttyYDOPLKMKTL2109-07-90 14:47:00





             Test Item    Value        Reference Range Interpretation Comments

 

             Monocytes # (test code = Monocytes #) 461          200-950         

          



[facility]KL[facility]MBOJ5486[facility]33-04 14:47:00





             Test Item    Value        Reference Range Interpretation Comments

 

             Eosinophils # (test code = Eosinophils 122                   

           



             #)                                                  



[facility]AcqdyzlDECWSDWUDD7913-36-42 14:47:00





             Test Item    Value        Reference Range Interpretation Comments

 

             Basophils # (test code 50           See_Comment                [Aut

omated message] The



             = Basophils #)                                        system which 

generated



                                                                 this result tra

nsmitted



                                                                 reference range

: <=200.



                                                                 The reference r

cheyenne was



                                                                 not used to int

erpret



                                                                 this result as



                                                                 normal/abnormal

.



[facility]KxbhuanGVCYNGQUGA8800[facility] 14:47:00





             Test Item    Value        Reference Range Interpretation Comments

 

             Segs (test code = Segs) 75.2                                   



[facility]OEKPSCJ2107-86-87 14:47:00





             Test Item    Value        Reference Range Interpretation Comments

 

             Lymphocytes (test code = Lymphocytes) 16.0                         

          



[facility]lakeZammrjtZWYJSRXIHX7918-45-62 14:47:00





             Test Item    Value        Reference Range Interpretation Comments

 

             Monocytes (test code = Monocytes) 6.4                              

      



[facility]lakeDicdesnLGXVNIQBAX8526-37-47 14:47:00





             Test Item    Value        Reference Range Interpretation Comments

 

             Eosinophils (test code = Eosinophils) 1.7                          

          



[facility]lakeFbrhtihCZCOOIBLOE4730-39-99 14:47:00





             Test Item    Value        Reference Range Interpretation Comments

 

             Basophils (test code = Basophils) 0.7                              

      



[facility] [id number] 14:47:00





             Test Item    Value        Reference Range Interpretation Comments

 

             Result 2 (Urine Culture) See Result Comment                        

   



             (test code = Result 2                                        



             (Urine Culture))                                        



[facility] AND [id number] 14:47:00





             Test Item    Value        Reference Range Interpretation Comments

 

             UA Color (test code = UA Color) YELLOW                             

    



[facility] AND [id number] 14:47:00





             Test Item    Value        Reference Range Interpretation Comments

 

             UA Turbidity (test code = UA CLOUDY                                

 



             Turbidity)                                          



[facility] AND [id number] 14:47:00





             Test Item    Value        Reference Range Interpretation Comments

 

             UA Spec Grav (test code = UA Spec 1.017 1      1.001-1.035         

      



             Grav)                                               



[facility] AND [id number] 14:47:00





             Test Item    Value        Reference Range Interpretation Comments

 

             UA pH (test code = UA pH) 5.5 1        5.0-8.0                   



[facility] [id number] 14:47:00





             Test Item    Value        Reference Range Interpretation Comments

 

             UA Glucose (test code = UA Glucose) NEGATIVE                       

        



[facility] AND [id number] 14:47:00





             Test Item    Value        Reference Range Interpretation Comments

 

             UA Bili (test code = UA Bili) NEGATIVE                             

  



[facility] AND [id number] 14:47:00





             Test Item    Value        Reference Range Interpretation Comments

 

             UA Ketones (test code = UA Ketones) NEGATIVE                       

        



[facility] [id number] 14:47:00





             Test Item    Value        Reference Range Interpretation Comments

 

             UA Blood (test code = UA Blood) 1+                                 

    



[facility] [id number] 14:47:00





             Test Item    Value        Reference Range Interpretation Comments

 

             UA Protein (test code = UA Protein) 1+                             

        



[facility] [id number] 14:47:00





             Test Item    Value        Reference Range Interpretation Comments

 

             UA Nitrite (test code = UA Nitrite) POSITIVE                       

        



[facility] [id number] 14:47:00





             Test Item    Value        Reference Range Interpretation Comments

 

             UA Leuk Est (test code = UA Leuk Est) 2+                           

          



[facility] [id number] 14:47:00





             Test Item    Value        Reference Range Interpretation Comments

 

             UA WBC (test code = UA WBC) > OR = 60                              



[facility] [id number] 14:47:00





             Test Item    Value        Reference Range Interpretation Comments

 

             UA RBC (test code = UA RBC) 0-2                                    



[facility] [id number] 14:47:00





             Test Item    Value        Reference Range Interpretation Comments

 

             UA Sq Epi (test code = UA Sq Epi) NONE SEEN                        

      



[facility] [id number] 14:47:00





             Test Item    Value        Reference Range Interpretation Comments

 

             UA Bacteria (test code = UA Bacteria) MANY                         

          



[facility] [id number] 14:47:00





             Test Item    Value        Reference Range Interpretation Comments

 

             UA Hyal Cast (test code = UA Hyal NONE SEEN                        

      



             Cast)                                               



[facility] [id number] 14:47:00





             Test Item    Value        Reference Range Interpretation Comments

 

             UA Reflex (test code CULTURE INDICATED -                           



             = UA Reflex) RESULTS TO FOLLOW                           



[facility] [id number] 14:47:00





             Test Item    Value        Reference Range Interpretation Comments

 

             U Creat mg/dL (test code = U Creat 114                       

       



             mg/dL)                                              



[facility] [id number] 14:47:00





             Test Item    Value        Reference Range Interpretation Comments

 

             U Alb (test code = U Alb) 16.7                                   



[facility] [id number] 14:47:00





             Test Item    Value        Reference Range Interpretation Comments

 

             U Alb/Crea (test code = U Alb/Crea) 146                            

        



[facility]2020-08-06 14:47:00





             Test Item    Value        Reference Range Interpretation Comments

 

             Vitamin D, 25-OH, Total (test code = 37                      

         



             Vitamin D, 25-OH, Total)                                        



[place]8880-08-06 14:47:00





             Test Item    Value        Reference Range Interpretation Comments

 

             U Creat mg/dL (test code = U Creat 114                       

       



             mg/dL)                                              



[facility]2020-08-06 14:47:00





             Test Item    Value        Reference Range Interpretation Comments

 

             U Prot/Creat (test code = U Prot/Creat) 430                  

            



[place] 268880-08-06 14:47:00





             Test Item    Value        Reference Range Interpretation Comments

 

             U Prot/Creat (test code = U 0.430 1      0.021-0.161               



             Prot/Creat)                                         



[facility]2020-08-06 14:47:00





             Test Item    Value        Reference Range Interpretation Comments

 

             U Protein (test code = U Protein) 49           5-24                

      



[facility]2020-08-06 14:47:00





             Test Item    Value        Reference Range Interpretation Comments

 

             Glucose Lvl (test code = Glucose Lvl) 103          65-99           

          



[facility]2020-08-06 14:47:00





             Test Item    Value        Reference Range Interpretation Comments

 

             BUN (test code = BUN) 24           7-25                      



[place] 268880-08-06 14:47:00





             Test Item    Value        Reference Range Interpretation Comments

 

             Creatinine Lvl (test code = Creatinine 1.32         0.50-0.99      

           



             Lvl)                                                



[facility]2020-08-06 14:47:00





             Test Item    Value        Reference Range Interpretation Comments

 

             eGFR NON-AFR. AMERICAN (test code = 43                             

        



             eGFR NON-AFR. AMERICAN)                                        



[facility]2020-08-06 14:47:00





             Test Item    Value        Reference Range Interpretation Comments

 

             eGFR  (test code = eGFR 50                         

            



             )                                        



[facility]2020-08-06 14:47:00





             Test Item    Value        Reference Range Interpretation Comments

 

             B/C Ratio (test code = B/C Ratio) 18           6-22                

      



[place] 268880-08-06 14:47:00





             Test Item    Value        Reference Range Interpretation Comments

 

             Sodium Lvl (test code = Sodium Lvl) 138          135-146           

        



[place] 268880-08-06 14:47:00





             Test Item    Value        Reference Range Interpretation Comments

 

             Potassium Lvl (test code = Potassium 4.4          3.5-5.3          

         



             Lvl)                                                



[place] 268880-08-06 14:47:00





             Test Item    Value        Reference Range Interpretation Comments

 

             Chloride Lvl (test code = Chloride Lvl) 102                  

            



[place] 268880-08-06 14:47:00





             Test Item    Value        Reference Range Interpretation Comments

 

             CO2 (test code = CO2) 30           20-32                     



[place] 268880-08-06 14:47:00





             Test Item    Value        Reference Range Interpretation Comments

 

             Calcium Lvl (test code = Calcium Lvl) 9.4          8.6-10.4        

          



[place] 268880-08-06 14:47:00





             Test Item    Value        Reference Range Interpretation Comments

 

             Phosphorus (test code = Phosphorus) 4.8          2.5-4.5           

        



[place] 268880-08-06 14:47:00





             Test Item    Value        Reference Range Interpretation Comments

 

             Albumin Lvl (test code = Albumin Lvl) 3.9          3.6-5.1         

          



[place] 699610-08-06 14:47:00





             Test Item    Value        Reference Range Interpretation Comments

 

             Plt Count Estimated (test code = DECREASED                         

     



             Plt Count Estimated)                                        



[place] 699610-08-06 14:47:00





             Test Item    Value        Reference Range Interpretation Comments

 

             WBC X 10x3 (test code = WBC X 10x3) 7.2          3.8-10.8          

        



[place] [address]-08-06 14:47:00





             Test Item    Value        Reference Range Interpretation Comments

 

             RBC X 10x6 (test code = RBC X 10x6) 3.71         3.80-5.10         

        



[place] [address]-08-06 14:47:00





             Test Item    Value        Reference Range Interpretation Comments

 

             Hgb (test code = Hgb) 10.7         11.7-15.5                 



[place] [address]-08-06 14:47:00





             Test Item    Value        Reference Range Interpretation Comments

 

             Hct (test code = Hct) 33.9         35.0-45.0                 



[place] [address]-08-06 14:47:00





             Test Item    Value        Reference Range Interpretation Comments

 

             MCV (test code = MCV) 91.4         80.0-100.0                



[facility]MctjyixOKYZBYYBLU7403-06-84 14:47:00





             Test Item    Value        Reference Range Interpretation Comments

 

             MCH (test code = MCH) 28.8 pg      27.0-33.0                 



[facility]oundDqqqpllCBBYQQWVVD9740-41-00 14:47:00





             Test Item    Value        Reference Range Interpretation Comments

 

             MCHC (test code = MCHC) 31.6         32.0-36.0                 



[facility]oundRsetdktGPKVQAEXBJ5283-05-79 14:47:00





             Test Item    Value        Reference Range Interpretation Comments

 

             RDW (test code = RDW) 13.9         11.0-15.0                 



[facility]oundSejkcumJKBWBZTGPB3429-26-58 14:47:00





             Test Item    Value        Reference Range Interpretation Comments

 

             Platelet (test code = Platelet) 110          140-400               

    



[facility]oundEijhchhOWYXJJBOXH7656-23-78 14:47:00





             Test Item    Value        Reference Range Interpretation Comments

 

             MPV (test code = MPV) 11.7         7.5-12.5                  



[facility]oundQpfajiiKEKLYYSTUZ2285-57-24 14:47:00





             Test Item    Value        Reference Range Interpretation Comments

 

             Neutrophils # (test code = Neutrophils 5414         2010-3752      

           



             #)                                                  



[facility]oundOpnxprbCSLYCPSOTK9949-75-01 14:47:00





             Test Item    Value        Reference Range Interpretation Comments

 

             Lymphocytes # (test code = Lymphocytes 1152         850-3900       

           



             #)                                                  



[facility]oundUauwranRKTIGCTOGG9249-26-10 14:47:00





             Test Item    Value        Reference Range Interpretation Comments

 

             Monocytes # (test code = Monocytes #) 461          200-950         

          



[facility]oundQfxwsirMTJCQPYOWS8714-71-31 14:47:00





             Test Item    Value        Reference Range Interpretation Comments

 

             Eosinophils # (test code = Eosinophils 122                   

           



             #)                                                  



[facility]oundTaxlzkqGTBLPFAQPJ7800-19-73 14:47:00





             Test Item    Value        Reference Range Interpretation Comments

 

             Basophils # (test code 50           See_Comment                [Aut

omated message] The



             = Basophils #)                                        system which 

generated



                                                                 this result tra

nsmitted



                                                                 reference range

: <=200.



                                                                 The reference r

cheyenne was



                                                                 not used to int

erpret



                                                                 this result as



                                                                 normal/abnormal

.



[facility]oundZmsvnxqCXZDPENBIY1068-23-84 14:47:00





             Test Item    Value        Reference Range Interpretation Comments

 

             Segs (test code = Segs) 75.2                                   



[facility]oundQkimnpiIEFUEMVIKR3037-69-69 14:47:00





             Test Item    Value        Reference Range Interpretation Comments

 

             Lymphocytes (test code = Lymphocytes) 16.0                         

          



[place] [address]-08-06 14:47:00





             Test Item    Value        Reference Range Interpretation Comments

 

             Monocytes (test code = Monocytes) 6.4                              

      



[facility]ziluPYYQLIBZAQ5016-54-64 14:47:00





             Test Item    Value        Reference Range Interpretation Comments

 

             Eosinophils (test code = Eosinophils) 1.7                          

          



[facility] HospitalMbwrextSPJALLVCBW5134-38-10 14:47:00





             Test Item    Value        Reference Range Interpretation Comments

 

             Basophils (test code = Basophils) 0.7                              

      



[facility] [id number] 14:47:00





             Test Item    Value        Reference Range Interpretation Comments

 

             Result 2 (Urine Culture) See Result Comment                        

   



             (test code = Result 2                                        



             (Urine Culture))                                        



[facility] AND [id number] 14:47:00





             Test Item    Value        Reference Range Interpretation Comments

 

             UA Color (test code = UA Color) YELLOW                             

    



[facility] AND [id number] 14:47:00





             Test Item    Value        Reference Range Interpretation Comments

 

             UA Turbidity (test code = UA CLOUDY                                

 



             Turbidity)                                          



[facility] AND [id number] 14:47:00





             Test Item    Value        Reference Range Interpretation Comments

 

             UA Spec Grav (test code = UA Spec 1.017 1      1.001-1.035         

      



             Grav)                                               



[facility] AND [id number] 14:47:00





             Test Item    Value        Reference Range Interpretation Comments

 

             UA pH (test code = UA pH) 5.5 1        5.0-8.0                   



[facility] AND [id number] 14:47:00





             Test Item    Value        Reference Range Interpretation Comments

 

             UA Glucose (test code = UA Glucose) NEGATIVE                       

        



[facility] AND [id number] 14:47:00





             Test Item    Value        Reference Range Interpretation Comments

 

             UA Bili (test code = UA Bili) NEGATIVE                             

  



[facility] AND [id number] 14:47:00





             Test Item    Value        Reference Range Interpretation Comments

 

             UA Ketones (test code = UA Ketones) NEGATIVE                       

        



[facility] AND [id number] 14:47:00





             Test Item    Value        Reference Range Interpretation Comments

 

             UA Blood (test code = UA Blood) 1+                                 

    



[facility] AND [id number] 14:47:00





             Test Item    Value        Reference Range Interpretation Comments

 

             UA Protein (test code = UA Protein) 1+                             

        



[facility] AND [id number] 14:47:00





             Test Item    Value        Reference Range Interpretation Comments

 

             UA Nitrite (test code = UA Nitrite) POSITIVE                       

        



[facility] AND [id number] 14:47:00





             Test Item    Value        Reference Range Interpretation Comments

 

             UA Leuk Est (test code = UA Leuk Est) 2+                           

          



[facility] AND [id number] 14:47:00





             Test Item    Value        Reference Range Interpretation Comments

 

             UA WBC (test code = UA WBC) > OR = 60                              



[facility] AND [id number] 14:47:00





             Test Item    Value        Reference Range Interpretation Comments

 

             UA RBC (test code = UA RBC) 0-2                                    



[facility] AND [id number] 14:47:00





             Test Item    Value        Reference Range Interpretation Comments

 

             UA Sq Epi (test code = UA Sq Epi) NONE SEEN                        

      



[facility] AND [id number] 14:47:00





             Test Item    Value        Reference Range Interpretation Comments

 

             UA Bacteria (test code = UA Bacteria) MANY                         

          



[facility] AND [id number] 14:47:00





             Test Item    Value        Reference Range Interpretation Comments

 

             UA Hyal Cast (test code = UA Hyal NONE SEEN                        

      



             Cast)                                               



[facility] AND [id number] 14:47:00





             Test Item    Value        Reference Range Interpretation Comments

 

             UA Reflex (test code CULTURE INDICATED -                           



             = UA Reflex) RESULTS TO FOLLOW                           



[facility]2020-08-06 14:47:00





             Test Item    Value        Reference Range Interpretation Comments

 

             U Creat mg/dL (test code = U Creat 114                       

       



             mg/dL)                                              



[facility]2020-08-06 14:47:00





             Test Item    Value        Reference Range Interpretation Comments

 

             U Alb (test code = U Alb) 16.7                                   



[facility]2020-08-06 14:47:00





             Test Item    Value        Reference Range Interpretation Comments

 

             U Alb/Crea (test code = U Alb/Crea) 146                            

        



[facility] [id number] 14:47:00





             Test Item    Value        Reference Range Interpretation Comments

 

             Vitamin D, 25-OH, Total (test code = 37                      

         



             Vitamin D, 25-OH, Total)                                        



[facility] [facility] [id number] 14:47:00





             Test Item    Value        Reference Range Interpretation Comments

 

             U Creat mg/dL (test code = U Creat 114                       

       



             mg/dL)                                              



[facility] [id number] 14:47:00





             Test Item    Value        Reference Range Interpretation Comments

 

             U Prot/Creat (test code = U Prot/Creat) 430                  

            



[facility]2020-08-06 14:47:00





             Test Item    Value        Reference Range Interpretation Comments

 

             U Prot/Creat (test code = U 0.430 1      0.021-0.161               



             Prot/Creat)                                         



[place] 268880-08-06 14:47:00





             Test Item    Value        Reference Range Interpretation Comments

 

             U Protein (test code = U Protein) 49           5-24                

      



[facility]2020-08-06 14:47:00





             Test Item    Value        Reference Range Interpretation Comments

 

             Glucose Lvl (test code = Glucose Lvl) 103          65-99           

          



[place] 268880-08-06 14:47:00





             Test Item    Value        Reference Range Interpretation Comments

 

             BUN (test code = BUN) 24           7-25                      



[facility]2020-08-06 14:47:00





             Test Item    Value        Reference Range Interpretation Comments

 

             Creatinine Lvl (test code = Creatinine 1.32         0.50-0.99      

           



             Lvl)                                                



[facility]2020-08-06 14:47:00





             Test Item    Value        Reference Range Interpretation Comments

 

             eGFR NON-AFR. AMERICAN (test code = 43                             

        



             eGFR NON-AFR. AMERICAN)                                        



[facility]2020-08-06 14:47:00





             Test Item    Value        Reference Range Interpretation Comments

 

             eGFR  (test code = eGFR 50                         

            



             )                                        



[facility]2020-08-06 14:47:00





             Test Item    Value        Reference Range Interpretation Comments

 

             B/C Ratio (test code = B/C Ratio) 18           6-22                

      



[facility]2020-08-06 14:47:00





             Test Item    Value        Reference Range Interpretation Comments

 

             Sodium Lvl (test code = Sodium Lvl) 138          135-146           

        



[place] 268880-08-06 14:47:00





             Test Item    Value        Reference Range Interpretation Comments

 

             Potassium Lvl (test code = Potassium 4.4          3.5-5.3          

         



             Lvl)                                                



[facility]2020-08-06 14:47:00





             Test Item    Value        Reference Range Interpretation Comments

 

             Chloride Lvl (test code = Chloride Lvl) 102                  

            



[place] 268880-08-06 14:47:00





             Test Item    Value        Reference Range Interpretation Comments

 

             CO2 (test code = CO2) 30           20-32                     



[place] 268880-08-06 14:47:00





             Test Item    Value        Reference Range Interpretation Comments

 

             Calcium Lvl (test code = Calcium Lvl) 9.4          8.6-10.4        

          



[facility]2020-08-06 14:47:00





             Test Item    Value        Reference Range Interpretation Comments

 

             Phosphorus (test code = Phosphorus) 4.8          2.5-4.5           

        



[facility]2020-08-06 14:47:00





             Test Item    Value        Reference Range Interpretation Comments

 

             Albumin Lvl (test code = Albumin Lvl) 3.9          3.6-5.1         

          



[facility]200[facility]





             Test Item    Value        Reference Range Interpretation Comments

 

             Plt Count Estimated (test code = DECREASED                         

     



             Plt Count Estimated)                                        



[place] 699610-08-06 14:47:00





             Test Item    Value        Reference Range Interpretation Comments

 

             WBC X 10x3 (test code = WBC X 10x3) 7.2          3.8-10.8          

        



[place] 699610-08-06 14:47:00





             Test Item    Value        Reference Range Interpretation Comments

 

             RBC X 10x6 (test code = RBC X 10x6) 3.71         3.80-5.10         

        



[place] [address]-08-06 14:47:00





             Test Item    Value        Reference Range Interpretation Comments

 

             Hgb (test code = Hgb) 10.7         11.7-15.5                 



[place] 699610-08-06 14:47:00





             Test Item    Value        Reference Range Interpretation Comments

 

             Hct (test code = Hct) 33.9         35.0-45.0                 



[place] 699610-08-06 14:47:00





             Test Item    Value        Reference Range Interpretation Comments

 

             MCV (test code = MCV) 91.4         80.0-100.0                



[place] 699610-08-06 14:47:00





             Test Item    Value        Reference Range Interpretation Comments

 

             MCH (test code = MCH) 28.8 pg      27.0-33.0                 



[place] 699610-08-06 14:47:00





             Test Item    Value        Reference Range Interpretation Comments

 

             MCHC (test code = MCHC) 31.6         32.0-36.0                 



[place] 699610-08-06 14:47:00





             Test Item    Value        Reference Range Interpretation Comments

 

             RDW (test code = RDW) 13.9         11.0-15.0                 



[place] [address]-08-06 14:47:00





             Test Item    Value        Reference Range Interpretation Comments

 

             Platelet (test code = Platelet) 110          140-400               

    



[facility] 14[facility]47:00





             Test Item    Value        Reference Range Interpretation Comments

 

             MPV (test code = MPV) 11.7         7.5-12.5                  



[facility]vxsxpeHWMPYCKZHA7940-46-13 14:47:00





             Test Item    Value        Reference Range Interpretation Comments

 

             Neutrophils # (test code = Neutrophils 5414         9767-7617      

           



             #)                                                  



[facility]IpttkejWLFLKSVOOP6063-49-88 14:47:00





             Test Item    Value        Reference Range Interpretation Comments

 

             Lymphocytes # (test code = Lymphocytes 1152         850-3900       

           



             #)                                                  



[facility] HospitalZjyjlocPSHLDYAZQL4742-10-04 14:47:00





             Test Item    Value        Reference Range Interpretation Comments

 

             Monocytes # (test code = Monocytes #) 461          200-950         

          



[facility] HospitalHqznpaoGZJOQKSOVA6824-98-48 14:47:00





             Test Item    Value        Reference Range Interpretation Comments

 

             Eosinophils # (test code = Eosinophils 122                   

           



             #)                                                  



[facility] HospitalGlfvuchJKYDWPYUKX0643-54-43 14:47:00





             Test Item    Value        Reference Range Interpretation Comments

 

             Basophils # (test code 50           See_Comment                [Aut

omated message] The



             = Basophils #)                                        system which 

generated



                                                                 this result tra

nsmitted



                                                                 reference range

: <=200.



                                                                 The reference r

cheyenne was



                                                                 not used to int

erpret



                                                                 this result as



                                                                 normal/abnormal

.



[facility]XsgqcjaAKTTLLBXWV5361-29-01 14:47:00





             Test Item    Value        Reference Range Interpretation Comments

 

             Segs (test code = Segs) 75.2                                   



[facility]oundOmdklgfQWODLZBINX2108-14-11 14:47:00





             Test Item    Value        Reference Range Interpretation Comments

 

             Lymphocytes (test code = Lymphocytes) 16.0                         

          



[facility] HospitalNyrovrzWROMMUDFKY0727-47-40 14:47:00





             Test Item    Value        Reference Range Interpretation Comments

 

             Monocytes (test code = Monocytes) 6.4                              

      



[facility]oundMgmtmlpNCVGOHXIZZ7379-75-93 14:47:00





             Test Item    Value        Reference Range Interpretation Comments

 

             Eosinophils (test code = Eosinophils) 1.7                          

          



[facility] HospitalYpflaswYUGDWSTWET2928-13-07 14:47:00





             Test Item    Value        Reference Range Interpretation Comments

 

             Basophils (test code = Basophils) 0.7                              

      



[facility] HJQGZZT3836-10-39 14:47:00





             Test Item    Value        Reference Range Interpretation Comments

 

             Result 2 (Urine Culture) See Result Comment                        

   



             (test code = Result 2                                        



             (Urine Culture))                                        



[facility] [id number] 14:47:00





             Test Item    Value        Reference Range Interpretation Comments

 

             UA Color (test code = UA Color) YELLOW                             

    



[facility] [id number] 14:47:00





             Test Item    Value        Reference Range Interpretation Comments

 

             UA Turbidity (test code = UA CLOUDY                                

 



             Turbidity)                                          



[facility] [id number] 14:47:00





             Test Item    Value        Reference Range Interpretation Comments

 

             UA Spec Grav (test code = UA Spec 1.017 1      1.001-1.035         

      



             Grav)                                               



[facility] [id number] 14:47:00





             Test Item    Value        Reference Range Interpretation Comments

 

             UA pH (test code = UA pH) 5.5 1        5.0-8.0                   



[facility] [id number] 14:47:00





             Test Item    Value        Reference Range Interpretation Comments

 

             UA Glucose (test code = UA Glucose) NEGATIVE                       

        



[facility] [id number] 14:47:00





             Test Item    Value        Reference Range Interpretation Comments

 

             UA Bili (test code = UA Bili) NEGATIVE                             

  



[facility] [id number] 14:47:00





             Test Item    Value        Reference Range Interpretation Comments

 

             UA Ketones (test code = UA Ketones) NEGATIVE                       

        



[facility] [id number] 14:47:00





             Test Item    Value        Reference Range Interpretation Comments

 

             UA Blood (test code = UA Blood) 1+                                 

    



[facility] [id number] 14:47:00





             Test Item    Value        Reference Range Interpretation Comments

 

             UA Protein (test code = UA Protein) 1+                             

        



[facility] [id number] 14:47:00





             Test Item    Value        Reference Range Interpretation Comments

 

             UA Nitrite (test code = UA Nitrite) POSITIVE                       

        



[facility] [id number] 14:47:00





             Test Item    Value        Reference Range Interpretation Comments

 

             UA Leuk Est (test code = UA Leuk Est) 2+                           

          



[facility] [id number] 14:47:00





             Test Item    Value        Reference Range Interpretation Comments

 

             UA WBC (test code = UA WBC) > OR = 60                              



[facility] [id number] 14:47:00





             Test Item    Value        Reference Range Interpretation Comments

 

             UA RBC (test code = UA RBC) 0-2                                    



[facility] [id number] 14:47:00





             Test Item    Value        Reference Range Interpretation Comments

 

             UA Sq Epi (test code = UA Sq Epi) NONE SEEN                        

      



[facility] AND [id number] 14:47:00





             Test Item    Value        Reference Range Interpretation Comments

 

             UA Bacteria (test code = UA Bacteria) MANY                         

          



[facility] AND [id number] 14:47:00





             Test Item    Value        Reference Range Interpretation Comments

 

             UA Hyal Cast (test code = UA Hyal NONE SEEN                        

      



             Cast)                                               



[facility] AND [id number] 14:47:00





             Test Item    Value        Reference Range Interpretation Comments

 

             UA Reflex (test code CULTURE INDICATED -                           



             = UA Reflex) RESULTS TO FOLLOW                           



[facility]2020-08-06 14:47:00





             Test Item    Value        Reference Range Interpretation Comments

 

             U Creat mg/dL (test code = U Creat 114                       

       



             mg/dL)                                              



[facility]2020-08-06 14:47:00





             Test Item    Value        Reference Range Interpretation Comments

 

             U Alb (test code = U Alb) 16.7                                   



[place] 508090-08-06 14:47:00





             Test Item    Value        Reference Range Interpretation Comments

 

             U Alb/Crea (test code = U Alb/Crea) 146                            

        



[facility]2020-08-06 14:47:00





             Test Item    Value        Reference Range Interpretation Comments

 

             Vitamin D, 25-OH, Total (test code = 37                      

         



             Vitamin D, 25-OH, Total)                                        



[place] 268880-08-06 14:47:00





             Test Item    Value        Reference Range Interpretation Comments

 

             U Creat mg/dL (test code = U Creat 114                       

       



             mg/dL)                                              



[place] 268880-08-06 14:47:00





             Test Item    Value        Reference Range Interpretation Comments

 

             U Prot/Creat (test code = U Prot/Creat) 430                  

            



[facility]2020-08-06 14:47:00





             Test Item    Value        Reference Range Interpretation Comments

 

             U Prot/Creat (test code = U 0.430 1      0.021-0.161               



             Prot/Creat)                                         



[place] 268880-08-06 14:47:00





             Test Item    Value        Reference Range Interpretation Comments

 

             U Protein (test code = U Protein) 49           5-24                

      



[place] 268880-08-06 14:47:00





             Test Item    Value        Reference Range Interpretation Comments

 

             Glucose Lvl (test code = Glucose Lvl) 103          65-99           

          



[facility]2020-08-06 14:47:00





             Test Item    Value        Reference Range Interpretation Comments

 

             BUN (test code = BUN) 24           7-25                      



[place] 268880-08-06 14:47:00





             Test Item    Value        Reference Range Interpretation Comments

 

             Creatinine Lvl (test code = Creatinine 1.32         0.50-0.99      

           



             Lvl)                                                



[place] 268880-08-06 14:47:00





             Test Item    Value        Reference Range Interpretation Comments

 

             eGFR NON-AFR. AMERICAN (test code = 43                             

        



             eGFR NON-AFR. AMERICAN)                                        



[facility]2020-08-06 14:47:00





             Test Item    Value        Reference Range Interpretation Comments

 

             eGFR  (test code = eGFR 50                         

            



             )                                        



[place] 268880-08-06 14:47:00





             Test Item    Value        Reference Range Interpretation Comments

 

             B/C Ratio (test code = B/C Ratio) 18           6-22                

      



[place] 268880-08-06 14:47:00





             Test Item    Value        Reference Range Interpretation Comments

 

             Sodium Lvl (test code = Sodium Lvl) 138          135-146           

        



[place] 268880-08-06 14:47:00





             Test Item    Value        Reference Range Interpretation Comments

 

             Potassium Lvl (test code = Potassium 4.4          3.5-5.3          

         



             Lvl)                                                



[facility]Spl[facility]UNC [facility]eeSYNKK2736[facility]-14 14:47:00





             Test Item    Value        Reference Range Interpretation Comments

 

             Chloride Lvl (test code = Chloride Lvl) 102                  

            



[facility]2020-08-06 14:47:00





             Test Item    Value        Reference Range Interpretation Comments

 

             CO2 (test code = CO2) 30           20-32                     



[place] 268880-08-06 14:47:00





             Test Item    Value        Reference Range Interpretation Comments

 

             Calcium Lvl (test code = Calcium Lvl) 9.4          8.6-10.4        

          



[place] 268880-08-06 14:47:00





             Test Item    Value        Reference Range Interpretation Comments

 

             Phosphorus (test code = Phosphorus) 4.8          2.5-4.5           

        



[place] 268880-08-06 14:47:00





             Test Item    Value        Reference Range Interpretation Comments

 

             Albumin Lvl (test code = Albumin Lvl) 3.9          3.6-5.1         

          



[facility]ubvxjbIKKMLZZXUA0784-18-42 14:47:00





             Test Item    Value        Reference Range Interpretation Comments

 

             Plt Count Estimated (test code = DECREASED                         

     



             Plt Count Estimated)                                        



[facility]ZgssqolWYUSXMJZRA8755-36-00 14:47:00





             Test Item    Value        Reference Range Interpretation Comments

 

             WBC X 10x3 (test code = WBC X 10x3) 7.2          3.8-10.8          

        



[place] 699610-08-06 14:47:00





             Test Item    Value        Reference Range Interpretation Comments

 

             RBC X 10x6 (test code = RBC X 10x6) 3.71         3.80-5.10         

        



[place] 699610-08-06 14:47:00





             Test Item    Value        Reference Range Interpretation Comments

 

             Hgb (test code = Hgb) 10.7         11.7-15.5                 



[place] 699610-08-06 14:47:00





             Test Item    Value        Reference Range Interpretation Comments

 

             Hct (test code = Hct) 33.9         35.0-45.0                 



[facility]oundCivlvxmNKQHDKOPHM4635-68-50 14:47:00





             Test Item    Value        Reference Range Interpretation Comments

 

             MCV (test code = MCV) 91.4         80.0-100.0                



[place] 699610-08-06 14:47:00





             Test Item    Value        Reference Range Interpretation Comments

 

             MCH (test code = MCH) 28.8 pg      27.0-33.0                 



[facility]oundIsbjhvlQDGXCAGSSO1032-29-89 14:47:00





             Test Item    Value        Reference Range Interpretation Comments

 

             MCHC (test code = MCHC) 31.6         32.0-36.0                 



[facility]oundCjxazlpSZSYWCLLKQ5148-75-56 14:47:00





             Test Item    Value        Reference Range Interpretation Comments

 

             RDW (test code = RDW) 13.9         11.0-15.0                 



[place] [address]-08-06 14:47:00





             Test Item    Value        Reference Range Interpretation Comments

 

             Platelet (test code = Platelet) 110          140-400               

    



[facility]oundOhussbfHOWDPWWVQD1895-67-95 14:47:00





             Test Item    Value        Reference Range Interpretation Comments

 

             MPV (test code = MPV) 11.7         7.5-12.5                  



[place] 699610-08-06 14:47:00





             Test Item    Value        Reference Range Interpretation Comments

 

             Neutrophils # (test code = Neutrophils 5461         4865-4489      

           



             #)                                                  



[facility]oundAsdrcbyKVWZYQXSEC0651-66-05 14:47:00





             Test Item    Value        Reference Range Interpretation Comments

 

             Lymphocytes # (test code = Lymphocytes 1152         850-3900       

           



             #)                                                  



[facility]lakeBoravosFMULDDKEQP3896-98-09 14:47:00





             Test Item    Value        Reference Range Interpretation Comments

 

             Monocytes # (test code = Monocytes #) 461          200-950         

          



[facility]lakeMrojteaEOTCIESMZY2786-59-58 14:47:00





             Test Item    Value        Reference Range Interpretation Comments

 

             Eosinophils # (test code = Eosinophils 122                   

           



             #)                                                  



[facility] HospitalWuarsakSKIAHTEUYS8291-69-08 14:47:00





             Test Item    Value        Reference Range Interpretation Comments

 

             Basophils # (test code 50           See_Comment                [Aut

omated message] The



             = Basophils #)                                        system which 

generated



                                                                 this result tra

nsmitted



                                                                 reference range

: <=200.



                                                                 The reference r

cheyenne was



                                                                 not used to int

erpret



                                                                 this result as



                                                                 normal/abnormal

.



[id number] 14[facility]47:00





             Test Item    Value        Reference Range Interpretation Comments

 

             Segs (test code = Segs) 75.2                                   



[facility]adwgyqEBEWNYWPMD8553-50-69 14:47:00





             Test Item    Value        Reference Range Interpretation Comments

 

             Lymphocytes (test code = Lymphocytes) 16.0                         

          



[facility] HospitalXzbfbpnWLVMPRXYVY4658-23-80 14:47:00





             Test Item    Value        Reference Range Interpretation Comments

 

             Monocytes (test code = Monocytes) 6.4                              

      



[id number] 14:47:00





             Test Item    Value        Reference Range Interpretation Comments

 

             Eosinophils (test code = Eosinophils) 1.7                          

          



[facility] ClinicSegzvkzKXOGGMRZEW8620-88-44 14:47:00





             Test Item    Value        Reference Range Interpretation Comments

 

             Basophils (test code = Basophils) 0.7                              

      



[facility] [id number] 14:47:00





             Test Item    Value        Reference Range Interpretation Comments

 

             Result 2 (Urine Culture) See Result Comment                        

   



             (test code = Result 2                                        



             (Urine Culture))                                        



[facility] [id number] 14:47:00





             Test Item    Value        Reference Range Interpretation Comments

 

             UA Color (test code = UA Color) YELLOW                             

    



[facility] [id number] 14:47:00





             Test Item    Value        Reference Range Interpretation Comments

 

             UA Turbidity (test code = UA CLOUDY                                

 



             Turbidity)                                          



[facility] [id number] 14:47:00





             Test Item    Value        Reference Range Interpretation Comments

 

             UA Spec Grav (test code = UA Spec 1.017 1      1.001-1.035         

      



             Grav)                                               



[facility] [id number] 14:47:00





             Test Item    Value        Reference Range Interpretation Comments

 

             UA pH (test code = UA pH) 5.5 1        5.0-8.0                   



[facility] [id number] 14:47:00





             Test Item    Value        Reference Range Interpretation Comments

 

             UA Glucose (test code = UA Glucose) NEGATIVE                       

        



[facility] [id number] 14:47:00





             Test Item    Value        Reference Range Interpretation Comments

 

             UA Bili (test code = UA Bili) NEGATIVE                             

  



[facility] [id number] 14:47:00





             Test Item    Value        Reference Range Interpretation Comments

 

             UA Ketones (test code = UA Ketones) NEGATIVE                       

        



[facility] [id number] 14:47:00





             Test Item    Value        Reference Range Interpretation Comments

 

             UA Blood (test code = UA Blood) 1+                                 

    



[facility] [id number] 14:47:00





             Test Item    Value        Reference Range Interpretation Comments

 

             UA Protein (test code = UA Protein) 1+                             

        



[facility] [id number] 14:47:00





             Test Item    Value        Reference Range Interpretation Comments

 

             UA Nitrite (test code = UA Nitrite) POSITIVE                       

        



[facility] [id number] 14:47:00





             Test Item    Value        Reference Range Interpretation Comments

 

             UA Leuk Est (test code = UA Leuk Est) 2+                           

          



[facility] [id number] 14:47:00





             Test Item    Value        Reference Range Interpretation Comments

 

             UA WBC (test code = UA WBC) > OR = 60                              



[facility] [id number] 14:47:00





             Test Item    Value        Reference Range Interpretation Comments

 

             UA RBC (test code = UA RBC) 0-2                                    



[facility] [id number] 14:47:00





             Test Item    Value        Reference Range Interpretation Comments

 

             UA Sq Epi (test code = UA Sq Epi) NONE SEEN                        

      



[facility] [id number] 14:47:00





             Test Item    Value        Reference Range Interpretation Comments

 

             UA Bacteria (test code = UA Bacteria) MANY                         

          



[facility] [id number] 14:47:00





             Test Item    Value        Reference Range Interpretation Comments

 

             UA Hyal Cast (test code = UA Hyal NONE SEEN                        

      



             Cast)                                               



[facility] [id number] 14:47:00





             Test Item    Value        Reference Range Interpretation Comments

 

             UA Reflex (test code CULTURE INDICATED -                           



             = UA Reflex) RESULTS TO FOLLOW                           



[place] 508090-08-06 14:47:00





             Test Item    Value        Reference Range Interpretation Comments

 

             U Creat mg/dL (test code = U Creat 114                       

       



             mg/dL)                                              



[place] 508090-08-06 14:47:00





             Test Item    Value        Reference Range Interpretation Comments

 

             U Alb (test code = U Alb) 16.7                                   



[place] [address]-08-06 14:47:00





             Test Item    Value        Reference Range Interpretation Comments

 

             U Alb/Crea (test code = U Alb/Crea) 146                            

        



[place] 268880-08-06 14:47:00





             Test Item    Value        Reference Range Interpretation Comments

 

             Vitamin D, 25-OH, Total (test code = 37                      

         



             Vitamin D, 25-OH, Total)                                        



[place] 268880-08-06 14:47:00





             Test Item    Value        Reference Range Interpretation Comments

 

             U Creat mg/dL (test code = U Creat 114                       

       



             mg/dL)                                              



[place] 268880-08-06 14:47:00





             Test Item    Value        Reference Range Interpretation Comments

 

             U Prot/Creat (test code = U Prot/Creat) 430                  

            



[facility]2020-08-06 14:47:00





             Test Item    Value        Reference Range Interpretation Comments

 

             U Prot/Creat (test code = U 0.430 1      0.021-0.161               



             Prot/Creat)                                         



[place] [address]-08-06 14:47:00





             Test Item    Value        Reference Range Interpretation Comments

 

             U Protein (test code = U Protein) 49           5-24                

      



[place] 268880-08-06 14:47:00





             Test Item    Value        Reference Range Interpretation Comments

 

             Glucose Lvl (test code = Glucose Lvl) 103          65-99           

          



[place] 268880-08-06 14:47:00





             Test Item    Value        Reference Range Interpretation Comments

 

             BUN (test code = BUN) 24           7-25                      



[place] 268880-08-06 14:47:00





             Test Item    Value        Reference Range Interpretation Comments

 

             Creatinine Lvl (test code = Creatinine 1.32         0.50-0.99      

           



             Lvl)                                                



[place] 268880-08-06 14:47:00





             Test Item    Value        Reference Range Interpretation Comments

 

             eGFR NON-AFR. AMERICAN (test code = 43                             

        



             eGFR NON-AFR. AMERICAN)                                        



[place] 268880-08-06 14:47:00





             Test Item    Value        Reference Range Interpretation Comments

 

             eGFR  (test code = eGFR 50                         

            



             )                                        



[place] 268880-08-06 14:47:00





             Test Item    Value        Reference Range Interpretation Comments

 

             B/C Ratio (test code = B/C Ratio) 18           6-22                

      



[place] 268880-08-06 14:47:00





             Test Item    Value        Reference Range Interpretation Comments

 

             Sodium Lvl (test code = Sodium Lvl) 138          135-146           

        



[place] 268880-08-06 14:47:00





             Test Item    Value        Reference Range Interpretation Comments

 

             Potassium Lvl (test code = Potassium 4.4          3.5-5.3          

         



             Lvl)                                                



[place] 268880-08-06 14:47:00





             Test Item    Value        Reference Range Interpretation Comments

 

             Chloride Lvl (test code = Chloride Lvl) 102                  

            



[place] 268880-08-06 14:47:00





             Test Item    Value        Reference Range Interpretation Comments

 

             CO2 (test code = CO2) 30           20-32                     



[place] 268880-08-06 14:47:00





             Test Item    Value        Reference Range Interpretation Comments

 

             Calcium Lvl (test code = Calcium Lvl) 9.4          8.6-10.4        

          



[place] 268880-08-06 14:47:00





             Test Item    Value        Reference Range Interpretation Comments

 

             Phosphorus (test code = Phosphorus) 4.8          2.5-4.5           

        



[place] 268880-08-06 14:47:00





             Test Item    Value        Reference Range Interpretation Comments

 

             Albumin Lvl (test code = Albumin Lvl) 3.9          3.6-5.1         

          



[place] 699610-08-06 14:47:00





             Test Item    Value        Reference Range Interpretation Comments

 

             Plt Count Estimated (test code = DECREASED                         

     



             Plt Count Estimated)                                        



[place] 699610-08-06 14:47:00





             Test Item    Value        Reference Range Interpretation Comments

 

             WBC X 10x3 (test code = WBC X 10x3) 7.2          3.8-10.8          

        



[place] [address]-08-06 14:47:00





             Test Item    Value        Reference Range Interpretation Comments

 

             RBC X 10x6 (test code = RBC X 10x6) 3.71         3.80-5.10         

        



[place] 699610-08-06 14:47:00





             Test Item    Value        Reference Range Interpretation Comments

 

             Hgb (test code = Hgb) 10.7         11.7-15.5                 



[facility]ox[facility]BL[facility]K8787[facility] 14[facility]47:[facility]





             Test Item    Value        Reference Range Interpretation Comments

 

             Hct (test code = Hct) 33.9         35.0-45.0                 



[facility]ErwxiotGCEYFWIVOE9476-11-15 14:47:00





             Test Item    Value        Reference Range Interpretation Comments

 

             MCV (test code = MCV) 91.4         80.0-100.0                



[facility]oundDwhglujNTNLIQIDKJ7285-17-37 14:47:00





             Test Item    Value        Reference Range Interpretation Comments

 

             MCH (test code = MCH) 28.8 pg      27.0-33.0                 



[facility]XjxkahsHGVSOZLNVD0651-41-32 14:47:00





             Test Item    Value        Reference Range Interpretation Comments

 

             MCHC (test code = MCHC) 31.6         32.0-36.0                 



[facility]oundYfaanbsYCXTLHGLGL5133-61-69 14:47:00





             Test Item    Value        Reference Range Interpretation Comments

 

             RDW (test code = RDW) 13.9         11.0-15.0                 



[facility]oundSvaixfeJDLWKJAWPO9508-31-53 14:47:00





             Test Item    Value        Reference Range Interpretation Comments

 

             Platelet (test code = Platelet) 110          140-400               

    



[facility]oundTxtbielTKEEQWWNKY8582-79-62 14:47:00





             Test Item    Value        Reference Range Interpretation Comments

 

             MPV (test code = MPV) 11.7         7.5-12.5                  



[facility]oundZeprgyyHACAUGFTGY5930-00-99 14:47:00





             Test Item    Value        Reference Range Interpretation Comments

 

             Neutrophils # (test code = Neutrophils 5414         8785-1099      

           



             #)                                                  



[facility]oundIeolbdtJWFTKARYLY3974-08-44 14:47:00





             Test Item    Value        Reference Range Interpretation Comments

 

             Lymphocytes # (test code = Lymphocytes 1152         850-3900       

           



             #)                                                  



[facility]oundHgbnpnzBANXHINFJL1078-35-36 14:47:00





             Test Item    Value        Reference Range Interpretation Comments

 

             Monocytes # (test code = Monocytes #) 461          200-950         

          



[facility]oundGupxtglOLJBSKKHEM0720-32-72 14:47:00





             Test Item    Value        Reference Range Interpretation Comments

 

             Eosinophils # (test code = Eosinophils 122                   

           



             #)                                                  



[facility]oundTyouyixYISYNBCYAL3550-86-45 14:47:00





             Test Item    Value        Reference Range Interpretation Comments

 

             Basophils # (test code 50           See_Comment                [Aut

omated message] The



             = Basophils #)                                        system which 

generated



                                                                 this result tra

nsmitted



                                                                 reference range

: <=200.



                                                                 The reference r

cheyenne was



                                                                 not used to int

erpret



                                                                 this result as



                                                                 normal/abnormal

.



[facility]ttlmhcWCHPIQDOJK7614-20-79 14:47:00





             Test Item    Value        Reference Range Interpretation Comments

 

             Segs (test code = Segs) 75.2                                   



[facility] HospitalUkuhnheFBASBGYKFN8256-67-42 14:47:00





             Test Item    Value        Reference Range Interpretation Comments

 

             Lymphocytes (test code = Lymphocytes) 16.0                         

          



[facility] HospitalUprfcjmPEYZQEUYBM6578-19-14 14:47:00





             Test Item    Value        Reference Range Interpretation Comments

 

             Monocytes (test code = Monocytes) 6.4                              

      



[facility] HospitalEesvnhmIOJRYOTXZB7578-27-81 14:47:00





             Test Item    Value        Reference Range Interpretation Comments

 

             Eosinophils (test code = Eosinophils) 1.7                          

          



[facility] HospitalAftfiiuAWLKDLJXDX4244-47-71 14:47:00





             Test Item    Value        Reference Range Interpretation Comments

 

             Basophils (test code = Basophils) 0.7                              

      



[facility] [id number] 14:47:00





             Test Item    Value        Reference Range Interpretation Comments

 

             Result 2 (Urine Culture) See Result Comment                        

   



             (test code = Result 2                                        



             (Urine Culture))                                        



[facility] AND [id number] 14:47:00





             Test Item    Value        Reference Range Interpretation Comments

 

             UA Color (test code = UA Color) YELLOW                             

    



[facility] AND [id number] 14:47:00





             Test Item    Value        Reference Range Interpretation Comments

 

             UA Turbidity (test code = UA CLOUDY                                

 



             Turbidity)                                          



[facility] AND [id number] 14:47:00





             Test Item    Value        Reference Range Interpretation Comments

 

             UA Spec Grav (test code = UA Spec 1.017 1      1.001-1.035         

      



             Grav)                                               



[facility] AND [id number] 14:47:00





             Test Item    Value        Reference Range Interpretation Comments

 

             UA pH (test code = UA pH) 5.5 1        5.0-8.0                   



[facility] AND [id number] 14:47:00





             Test Item    Value        Reference Range Interpretation Comments

 

             UA Glucose (test code = UA Glucose) NEGATIVE                       

        



[facility] AND [id number] 14:47:00





             Test Item    Value        Reference Range Interpretation Comments

 

             UA Bili (test code = UA Bili) NEGATIVE                             

  



[facility] AND [id number] 14:47:00





             Test Item    Value        Reference Range Interpretation Comments

 

             UA Ketones (test code = UA Ketones) NEGATIVE                       

        



[facility] [id number] 14:47:00





             Test Item    Value        Reference Range Interpretation Comments

 

             UA Blood (test code = UA Blood) 1+                                 

    



[facility] [id number] 14:47:00





             Test Item    Value        Reference Range Interpretation Comments

 

             UA Protein (test code = UA Protein) 1+                             

        



[facility] [id number] 14:47:00





             Test Item    Value        Reference Range Interpretation Comments

 

             UA Nitrite (test code = UA Nitrite) POSITIVE                       

        



[facility] [id number] 14:47:00





             Test Item    Value        Reference Range Interpretation Comments

 

             UA Leuk Est (test code = UA Leuk Est) 2+                           

          



[facility] [id number] 14:47:00





             Test Item    Value        Reference Range Interpretation Comments

 

             UA WBC (test code = UA WBC) > OR = 60                              



[facility] [id number] 14:47:00





             Test Item    Value        Reference Range Interpretation Comments

 

             UA RBC (test code = UA RBC) 0-2                                    



[facility] [id number] 14:47:00





             Test Item    Value        Reference Range Interpretation Comments

 

             UA Sq Epi (test code = UA Sq Epi) NONE SEEN                        

      



[facility] [id number] 14:47:00





             Test Item    Value        Reference Range Interpretation Comments

 

             UA Bacteria (test code = UA Bacteria) MANY                         

          



[facility] [id number] 14:47:00





             Test Item    Value        Reference Range Interpretation Comments

 

             UA Hyal Cast (test code = UA Hyal NONE SEEN                        

      



             Cast)                                               



[facility] [id number] 14:47:00





             Test Item    Value        Reference Range Interpretation Comments

 

             UA Reflex (test code CULTURE INDICATED -                           



             = UA Reflex) RESULTS TO FOLLOW                           



[facility] [id number] 14:47:00





             Test Item    Value        Reference Range Interpretation Comments

 

             U Creat mg/dL (test code = U Creat 114                       

       



             mg/dL)                                              



[facility] [id number] 14:47:00





             Test Item    Value        Reference Range Interpretation Comments

 

             U Alb (test code = U Alb) 16.7                                   



[facility] [id number] 14:47:00





             Test Item    Value        Reference Range Interpretation Comments

 

             U Alb/Crea (test code = U Alb/Crea) 146                            

        



[facility]ann[id number] 14:47:00





             Test Item    Value        Reference Range Interpretation Comments

 

             Vitamin D, 25-OH, Total (test code = 37                      

         



             Vitamin D, 25-OH, Total)                                        



[facility]2020-08-06 14:47:00





             Test Item    Value        Reference Range Interpretation Comments

 

             U Creat mg/dL (test code = U Creat 114                       

       



             mg/dL)                                              



[facility] ClinicAuthenticlick [id number] 14:47:00





             Test Item    Value        Reference Range Interpretation Comments

 

             U Prot/Creat (test code = U Prot/Creat) 430                  

            



[place] 268880-08-06 14:47:00





             Test Item    Value        Reference Range Interpretation Comments

 

             U Prot/Creat (test code = U 0.430 1      0.021-0.161               



             Prot/Creat)                                         



[facility]2020-08-06 14:47:00





             Test Item    Value        Reference Range Interpretation Comments

 

             U Protein (test code = U Protein) 49           5-24                

      



[facility] ClinicAuthenticlick [id number] 14:47:00





             Test Item    Value        Reference Range Interpretation Comments

 

             Glucose Lvl (test code = Glucose Lvl) 103          65-99           

          



[facility] and ClinicAuthenticlick [id number] 14:47:00





             Test Item    Value        Reference Range Interpretation Comments

 

             BUN (test code = BUN) 24           7-25                      



[facility] [id number] 14:47:00





             Test Item    Value        Reference Range Interpretation Comments

 

             Creatinine Lvl (test code = Creatinine 1.32         0.50-0.99      

           



             Lvl)                                                



[facility] Clinic[facility] [id number] 14:47:00





             Test Item    Value        Reference Range Interpretation Comments

 

             eGFR NON-AFR. AMERICAN (test code = 43                             

        



             eGFR NON-AFR. AMERICAN)                                        



[facility]2020-08-06 14:47:00





             Test Item    Value        Reference Range Interpretation Comments

 

             eGFR  (test code = eGFR 50                         

            



             )                                        



[facility] [id number] 14:47:00





             Test Item    Value        Reference Range Interpretation Comments

 

             B/C Ratio (test code = B/C Ratio) 18           6-22                

      



[facility] [id number] 14:47:00





             Test Item    Value        Reference Range Interpretation Comments

 

             Sodium Lvl (test code = Sodium Lvl) 138          135-146           

        



[facility] [id number] 14:47:00





             Test Item    Value        Reference Range Interpretation Comments

 

             Potassium Lvl (test code = Potassium 4.4          3.5-5.3          

         



             Lvl)                                                



[place] 268880-08-06 14:47:00





             Test Item    Value        Reference Range Interpretation Comments

 

             Chloride Lvl (test code = Chloride Lvl) 102                  

            



[place] [address]-08-06 14:47:00





             Test Item    Value        Reference Range Interpretation Comments

 

             CO2 (test code = CO2) 30           20-32                     



[place] 268880-08-06 14:47:00





             Test Item    Value        Reference Range Interpretation Comments

 

             Calcium Lvl (test code = Calcium Lvl) 9.4          8.6-10.4        

          



[place] [address]-08-06 14:47:00





             Test Item    Value        Reference Range Interpretation Comments

 

             Phosphorus (test code = Phosphorus) 4.8          2.5-4.5           

        



[place] 268880-08-06 14:47:00





             Test Item    Value        Reference Range Interpretation Comments

 

             Albumin Lvl (test code = Albumin Lvl) 3.9          3.6-5.1         

          



[place] [address]-08-06 14:47:00





             Test Item    Value        Reference Range Interpretation Comments

 

             Plt Count Estimated (test code = DECREASED                         

     



             Plt Count Estimated)                                        



[place] [address]-08-06 14:47:00





             Test Item    Value        Reference Range Interpretation Comments

 

             WBC X 10x3 (test code = WBC X 10x3) 7.2          3.8-10.8          

        



[place] [address]-08-06 14:47:00





             Test Item    Value        Reference Range Interpretation Comments

 

             RBC X 10x6 (test code = RBC X 10x6) 3.71         3.80-5.10         

        



[place] [address]-08-06 14:47:00





             Test Item    Value        Reference Range Interpretation Comments

 

             Hgb (test code = Hgb) 10.7         11.7-15.5                 



[place] [address]-08-06 14:47:00





             Test Item    Value        Reference Range Interpretation Comments

 

             Hct (test code = Hct) 33.9         35.0-45.0                 



[place] [address]-08-06 14:47:00





             Test Item    Value        Reference Range Interpretation Comments

 

             MCV (test code = MCV) 91.4         80.0-100.0                



[place] [address]-08-06 14:47:00





             Test Item    Value        Reference Range Interpretation Comments

 

             MCH (test code = MCH) 28.8 pg      27.0-33.0                 



[id number] 14:47:00





             Test Item    Value        Reference Range Interpretation Comments

 

             MCHC (test code = MCHC) 31.6         32.0-36.0                 



[facility]oundBtspnfaIJNHVZCQID9313-25-30 14:47:00





             Test Item    Value        Reference Range Interpretation Comments

 

             RDW (test code = RDW) 13.9         11.0-15.0                 



[facility]OujtjhhENANGFPBXM0528-07-21 14:47:00





             Test Item    Value        Reference Range Interpretation Comments

 

             Platelet (test code = Platelet) 110          140-400               

    



[facility]FmuwrztQTFJWNEMWF7736-83-83 14:47:00





             Test Item    Value        Reference Range Interpretation Comments

 

             MPV (test code = MPV) 11.7         7.5-12.5                  



[facility]oundJvohbheSXDBNRLPGK0076-32-10 14:47:00





             Test Item    Value        Reference Range Interpretation Comments

 

             Neutrophils # (test code = Neutrophils 5414         7366-4312      

           



             #)                                                  



[facility]oundCzubhonEGBZVXWGTT8609-75-67 14:47:00





             Test Item    Value        Reference Range Interpretation Comments

 

             Lymphocytes # (test code = Lymphocytes 1152         850-3900       

           



             #)                                                  



[facility]oundPaxhilcXONAIWOJAS5654-91-39 14:47:00





             Test Item    Value        Reference Range Interpretation Comments

 

             Monocytes # (test code = Monocytes #) 461          200-950         

          



[facility]oundNdqoaooNZNUMWWSEL8632-88-21 14:47:00





             Test Item    Value        Reference Range Interpretation Comments

 

             Eosinophils # (test code = Eosinophils 122                   

           



             #)                                                  



[facility]oundIxbptooNKYQUZZNWD2095-13-18 14:47:00





             Test Item    Value        Reference Range Interpretation Comments

 

             Basophils # (test code 50           See_Comment                [Aut

omated message] The



             = Basophils #)                                        system which 

generated



                                                                 this result tra

nsmitted



                                                                 reference range

: <=200.



                                                                 The reference r

cheyenne was



                                                                 not used to int

erpret



                                                                 this result as



                                                                 normal/abnormal

.



[facility]oundHotafmuOZCPODAFNC6886-28-08 14:47:00





             Test Item    Value        Reference Range Interpretation Comments

 

             Segs (test code = Segs) 75.2                                   



[place] 699610-08-06 14:47:00





             Test Item    Value        Reference Range Interpretation Comments

 

             Lymphocytes (test code = Lymphocytes) 16.0                         

          



[place] 699610-08-06 14:47:00





             Test Item    Value        Reference Range Interpretation Comments

 

             Monocytes (test code = Monocytes) 6.4                              

      



[facility] [id number] 14:47:00





             Test Item    Value        Reference Range Interpretation Comments

 

             Eosinophils (test code = Eosinophils) 1.7                          

          



[facility]lakeCkfjavgVPUYXIDIXQ6035-97-07 14:47:00





             Test Item    Value        Reference Range Interpretation Comments

 

             Basophils (test code = Basophils) 0.7                              

      



[facility] [id number] 14:47:00





             Test Item    Value        Reference Range Interpretation Comments

 

             Result 2 (Urine Culture) See Result Comment                        

   



             (test code = Result 2                                        



             (Urine Culture))                                        



[facility] AND [id number] 14:47:00





             Test Item    Value        Reference Range Interpretation Comments

 

             UA Color (test code = UA Color) YELLOW                             

    



[facility] AND [id number] 14:47:00





             Test Item    Value        Reference Range Interpretation Comments

 

             UA Turbidity (test code = UA CLOUDY                                

 



             Turbidity)                                          



[facility] AND [id number] 14:47:00





             Test Item    Value        Reference Range Interpretation Comments

 

             UA Spec Grav (test code = UA Spec 1.017 1      1.001-1.035         

      



             Grav)                                               



[facility] AND [id number] 14:47:00





             Test Item    Value        Reference Range Interpretation Comments

 

             UA pH (test code = UA pH) 5.5 1        5.0-8.0                   



[facility] AND [id number] 14:47:00





             Test Item    Value        Reference Range Interpretation Comments

 

             UA Glucose (test code = UA Glucose) NEGATIVE                       

        



[facility] AND [id number] 14:47:00





             Test Item    Value        Reference Range Interpretation Comments

 

             UA Bili (test code = UA Bili) NEGATIVE                             

  



[facility] AND [id number] 14:47:00





             Test Item    Value        Reference Range Interpretation Comments

 

             UA Ketones (test code = UA Ketones) NEGATIVE                       

        



[facility] [id number] 14:47:00





             Test Item    Value        Reference Range Interpretation Comments

 

             UA Blood (test code = UA Blood) 1+                                 

    



[facility] [id number] 14:47:00





             Test Item    Value        Reference Range Interpretation Comments

 

             UA Protein (test code = UA Protein) 1+                             

        



[facility] [id number] 14:47:00





             Test Item    Value        Reference Range Interpretation Comments

 

             UA Nitrite (test code = UA Nitrite) POSITIVE                       

        



[facility] [id number] 14:47:00





             Test Item    Value        Reference Range Interpretation Comments

 

             UA Leuk Est (test code = UA Leuk Est) 2+                           

          



[facility] AND [id number] 14:47:00





             Test Item    Value        Reference Range Interpretation Comments

 

             UA WBC (test code = UA WBC) > OR = 60                              



[facility] AND [id number] 14:47:00





             Test Item    Value        Reference Range Interpretation Comments

 

             UA RBC (test code = UA RBC) 0-2                                    



[facility] AND [id number] 14:47:00





             Test Item    Value        Reference Range Interpretation Comments

 

             UA Sq Epi (test code = UA Sq Epi) NONE SEEN                        

      



[facility] [id number] 14:47:00





             Test Item    Value        Reference Range Interpretation Comments

 

             UA Bacteria (test code = UA Bacteria) MANY                         

          



[facility] AND [id number] 14:47:00





             Test Item    Value        Reference Range Interpretation Comments

 

             UA Hyal Cast (test code = UA Hyal NONE SEEN                        

      



             Cast)                                               



[facility] AND [id number] 14:47:00





             Test Item    Value        Reference Range Interpretation Comments

 

             UA Reflex (test code CULTURE INDICATED -                           



             = UA Reflex) RESULTS TO FOLLOW                           



[facility]2020-08-06 14:47:00





             Test Item    Value        Reference Range Interpretation Comments

 

             U Creat mg/dL (test code = U Creat 114                       

       



             mg/dL)                                              



[facility]2020-08-06 14:47:00





             Test Item    Value        Reference Range Interpretation Comments

 

             U Alb (test code = U Alb) 16.7                                   



[facility]2020-08-06 14:47:00





             Test Item    Value        Reference Range Interpretation Comments

 

             U Alb/Crea (test code = U Alb/Crea) 146                            

        



[facility]2020-08-06 14:47:00





             Test Item    Value        Reference Range Interpretation Comments

 

             Vitamin D, 25-OH, Total (test code = 37                      

         



             Vitamin D, 25-OH, Total)                                        



[facility]2020-08-06 14:47:00





             Test Item    Value        Reference Range Interpretation Comments

 

             U Creat mg/dL (test code = U Creat 114                       

       



             mg/dL)                                              



[facility]2020-08-06 14:47:00





             Test Item    Value        Reference Range Interpretation Comments

 

             U Prot/Creat (test code = U Prot/Creat) 430                  

            



[facility]2020-08-06 14:47:00





             Test Item    Value        Reference Range Interpretation Comments

 

             U Prot/Creat (test code = U 0.430 1      0.021-0.161               



             Prot/Creat)                                         



[facility]2020-08-06 14:47:00





             Test Item    Value        Reference Range Interpretation Comments

 

             U Protein (test code = U Protein) 49           5-24                

      



[facility]2020-08-06 14:47:00





             Test Item    Value        Reference Range Interpretation Comments

 

             Glucose Lvl (test code = Glucose Lvl) 103          65-99           

          



[facility]2020-08-06 14:47:00





             Test Item    Value        Reference Range Interpretation Comments

 

             BUN (test code = BUN) 24           7-25                      



[facility]2020-08-06 14:47:00





             Test Item    Value        Reference Range Interpretation Comments

 

             Creatinine Lvl (test code = Creatinine 1.32         0.50-0.99      

           



             Lvl)                                                



[facility]2020-08-06 14:47:00





             Test Item    Value        Reference Range Interpretation Comments

 

             eGFR NON-AFR. AMERICAN (test code = 43                             

        



             eGFR NON-AFR. AMERICAN)                                        



[facility]2020-08-06 14:47:00





             Test Item    Value        Reference Range Interpretation Comments

 

             eGFR  (test code = eGFR 50                         

            



             )                                        



[facility]2020-08-06 14:47:00





             Test Item    Value        Reference Range Interpretation Comments

 

             B/C Ratio (test code = B/C Ratio) 18           6-22                

      



[facility]2020-08-06 14:47:00





             Test Item    Value        Reference Range Interpretation Comments

 

             Sodium Lvl (test code = Sodium Lvl) 138          135-146           

        



[facility]2020-08-06 14:47:00





             Test Item    Value        Reference Range Interpretation Comments

 

             Potassium Lvl (test code = Potassium 4.4          3.5-5.3          

         



             Lvl)                                                



[facility] PROTEIN ELECTROPHORESIS-24HR [id number] 08:01:00





             Test Item    Value        Reference Range Interpretation Comments

 

             CREATININE, 24 HOUR 1.45 g/24 h  0.50-2.15                 



             URINE (test code =                                        



             59789325)                                           

 

             PROTEIN/CREATININE 292 mg/g creat < OR = 114   H            



             RATION (test code =                                        



             20950088)                                           

 

             PROTEIN, TOTAL 24 HR  mg/24 h  <150         H            TEST

 PERFORMED



             (test code = 64994964)                                        AT:QU

EST



                                                                 [facility]

IN



                                                                 [address]



                                                                 [id number] FELIX MD

 

             ALBUMIN (test code = 35 %                                   



             63402152)                                           

 

             ALPHA-1-GLOBULINS (test 10 %                                   



             code = 75016075)                                        

 

             ALPHA-2-GLOBULINS (test 12 %                                   



             code = 65641967)                                        

 

             BETA GLOBULINS (test 25 %                                   



             code = 10060472)                                        

 

             GAMMA GLOBULINS (test 18 %                                   



             code = 86294781)                                        

 

             INTERPRETATION (test                                        Albumin

 and



             code = 08484421)                                        various aba

bulin



                                                                 fractions



                                                                 detected on



                                                                 proteinelectrop

ho



                                                                 resis. No



                                                                 abnormal protei

n



                                                                 bands



                                                                 (Bence-Jonespro

te



                                                                 inuria)



                                                                 detected.TEST



                                                                 PERFORMED



                                                                 AT:[facility]

IN



                                                                 [address]



                                                                 [id number] FELIX MD



NEUTROPHIL CYTOPLASMIC [id number] 05:19:00





             Test Item    Value        Reference Range Interpretation Comments

 

             ANCA SCREEN (test NEGATIVE     NEGATIVE                  ANCA Scree

n includes



             code = 77670723)                                        evaluation 

for p-ANCA,



                                                                 c-ANCA andatypi

tayla p-ANCA.



                                                                 A positive ANCA

 screen



                                                                 reflexes to tit

erand



                                                                 pattern(s), e.g

.,



                                                                 cytoplasmic pat

tern



                                                                 (c-ANCA),perinu

clear



                                                                 pattern (p-ANCA

), or



                                                                 atypical p-ANCA



                                                                 pattern.c-ANCA 

and p-ANCA



                                                                 are observed in

 vasculitis,



                                                                 whereasatypical

 p-ANCA is



                                                                 observed in IBD



                                                                 (Inflammatory



                                                                 BowelDisease). 

Atypical



                                                                 p-ANCA is detec

kolby in about



                                                                 55% to 80% ofpa

tients with



                                                                 ulcerative coli

tis but only



                                                                 5% to 25% ofpat

ients with



                                                                 Crohn's disease

.TEST



                                                                 PERFORMED AT:[facility]



                                                                 DIAGNOSTICS/[facility]



                                                                 [id number] [place], [place]



                                                                 [id number] MARIEE MD,PHD

,RAMILA



COMPREHENSIVE METABOLIC [id number] 06:25:00





             Test Item    Value        Reference Range Interpretation Comments

 

             GLUCOSE (test code = 06D) 115 mg/dL           H            

 

             SODIUM (test code = 01A) 137 mmol/L   136-145                   

 

             POTASSIUM (test code = 01B) 3.3 mmol/L   3.6-5.1      L            

 

             CHLORIDE (test code = 04A) 97 mmol/L           L            

 

             CO2 (test code = 02A) 32 mmol/L    22-32                     

 

             ANION GAP (test code = ANG) 11.3 mmol/L                            

 

             BUN (test code = 05D) 36 mg/dL     7-18         H            

 

             CREATININE (test code = 03E) 1.4 mg/dL    0.4-1.1      H           

 

 

             BUN/CREA (test code = BCR) 25           12-20        H            

 

             CALCIUM (test code = 09D) 8.8 mg/dL    8.3-9.5                   

 

             BILI TOTAL (test code = 11A) 1.2 mg/dL    0.2-1.0      H           

 

 

             PROTEIN (test code = 07D) 6.5 g/dL     6.4-8.2                   

 

             ALBUMIN (test code = 08D) 2.9 g/dL     3.5-4.8      L            

 

             GLOBULIN (test code = GLB) 3.6 g/dL     1.5-3.8                   

 

             ALB/GLOB (test code = AGRR) 0.8          1.0-2.6      L            

 

             ALK PHOS (test code = 35A) 97 IU/L                          

 

             AST (test code = 30A) 15 IU/L      <=42                      

 

             ALT (test code = 31A) 35 IU/L      <=78                      



CBC (INCLUDES AUTOMATED DIFFERENTIAL)2019 06:06:00





             Test Item    Value        Reference Range Interpretation Comments

 

             WBC (test code = WBC) 6.9 10\S\3/uL 4.5-11.0                  

 

             RBC (test code = RBC) 3.56 10\S\6/uL 4.20-5.60    L            

 

             HGB (test code = HBG) 10.4 g/dL    12.0-15.5    L            

 

             HCT (test code = HCT) 32.1 %       35.0-44.0    L            

 

             MCV (test code = MCV) 90.2 fL      81.0-99.0                 

 

             MCH (test code = MCH) 29.2 pg      27.0-31.0                 

 

             MCHC (test code = MCHC) 32.4 g/dL    32.0-36.0                 

 

             RDW (test code = RDW) 15.0 %       11.5-14.5    H            

 

             PLT (test code = PLT) 158 10\S\3/uL 130-400                   

 

             MPV (test code = MPV) 10.7 fL      9.4-12.4                  

 

             NEUTROP # (test code = NE#) 4.4 10\S\3/uL 1.6-8.0                  

 

 

             LYMPH # (test code = LY#) 1.8 10\S\3/uL 1.1-3.5                   

 

             MONOCYTE # (test code = MO#) 0.5 10\S\3/uL 0.0-1.1                 

  

 

             EOSINOPH # (test code = EO#) 0.2 10\S\3/uL 0.0-0.7                 

  

 

             BASOPHIL # (test code = BA#) 0.0 10\S\3/uL 0.0-0.3                 

  

 

             IG # (test code = IG#) 0.03 10\S\3/uL 0.00-0.06                 

 

             NRBC # (test code = NRBC#) 0.00 10\S\3/uL 0.00-0.01                

 

 

             NEUTROPH % (test code = NE%) 64.0 %       35.0-73.0                

 

 

             LYMPH % (test code = LY%) 26.1 %       20.0-55.0                 

 

             MONO % (test code = MO%) 6.5 %        2.5-10.0                  

 

             EOSINOPH % (test code = EO%) 2.6 %        0.0-5.0                  

 

 

             BASOPHIL % (test code = BA%) 0.4 %        0.0-2.0                  

 

 

             IG % (test code = IG%) 0.4 %        0.0-0.8                   

 

             NRBC% (test code = NRBC%) 0.0 %        0.0-0.2                   

 

             MANDIFF (test code = MDIFF) NO           NO                        

 

             RBC MORPH (test code = RBCMOR)              NORMAL                 

   



URINE [id number] 09:53:00





             Test Item    Value        Reference Range Interpretation Comments

 

             Isolate 1 (test code = Proteus mirabilis              A            



             ISO1)                                               

 

             ampicillin (test code = am)  ug/mL                    S            

 

             ampicillin/sulbactam (test  ug/mL                    S            



             code = ams)                                         

 

             piperacillin/tazobactam  ug/mL                    S            



             (test code = tzp)                                        

 

             ceftazidime (test code =  ug/mL                    S            



             jeannine)                                                

 

             ceftriaxone1 (test code =  ug/mL                    S            



             ctr)                                                

 

             cefepime (test code = fep)  ug/mL                    S            

 

             aztreonam (test code = azm)  ug/mL                    S            

 

             ertapenem (test code = etp)  ug/mL                    S            

 

             meropenem (test code = mem)  ug/mL                    S            

 

             gentamicin (test code = gm)  ug/mL                    S            

 

             tobramycin (test code =  ug/mL                    S            



             tob)                                                

 

             levofloxacin (test code =  ug/mL                    S            



             lev)                                                

 

             trimethoprim/sulfamethoxazo  ug/mL                    S            



             le (test code = sxt)                                        



PARTHYROID HORMONE (INTACT)2019 08:24:00





             Test Item    Value        Reference Range Interpretation Comments

 

             PTH INTACT (test code 166.6 pg/mL  18.4-80.1    H            



             = A85)                                              

 

             Ref Range Change ***** Please note the                           



             (test code = REF change in reference                           



             RANGE)       range ***                              



VITAMIN D TOTAL 25 (OH)2019 07:15:00





             Test Item    Value        Reference Range Interpretation Comments

 

             VITAMIN D 25(OH) (test code = VD) 36.0 ng/mL   30.0-100.0          

      



SERUM PROTEIN [id number] 06:20:00





             Test Item    Value        Reference Range Interpretation Comments

 

             PROTEIN TOTAL (test 5.7 g/dL     6.1-8.1      L            TEST PER

FORMED AT:[facility]



             code = 93770329)                                        [facility]

[id number]



                                                                 [facility]

[place], [place]



                                                                 [id number] FELIX MD

 

             ALBUMIN (test code = 3.2 g/dL     3.8-4.8      L            



             [id number])                                           

 

             ALPHA-1-GLOBULINS 0.4 g/dL     0.2-0.3      H            



             (test code =                                        



             07457857)                                           

 

             ALPHA-2-GLOBULINS 0.8 g/dL     0.5-0.9                   



             (test code =                                        



             26481625)                                           

 

             BETA 1 GLOBULIN (test 0.5 g/dL     0.4-0.6                   



             code = 03839432)                                        

 

             BETA 2 GLOBULIN (test 0.2 g/dL     0.2-0.5                   



             code = 53025783)                                        

 

             GAMMA GLOBULINS (test 0.6 g/dL     0.8-1.7      L            



             code = 67490902)                                        

 

             INTERPRETATION (test                                        Pattern

 consistent with



             code = 14442293)                                        an acute ph

ase



                                                                 reactionConsist

ent with



                                                                 hypogammaglobul

inemia.



                                                                 Serum free ligh

tchains



                                                                 or urine immuno

fixation



                                                                 should be consi

dered



                                                                 ifplasma cell d

yscrasias



                                                                 are a possible



                                                                 clinicaldiagnos

is.TEST



                                                                 PERFORMED AT:[facility]

[facility]



                                                                 [facility]

[id number]



                                                                 [facility]

[place], [place]



                                                                 [id number]

KAYA FELIX MD



[id number] 06:13:00





             Test Item    Value        Reference Range Interpretation Comments

 

             MAGNESIUM (test code = 48A) 1.7 mg/dL    1.8-2.4      L            



COMPREHENSIVE METABOLIC [id number] 06:13:00





             Test Item    Value        Reference Range Interpretation Comments

 

             GLUCOSE (test code = 06D) 110 mg/dL           H            

 

             SODIUM (test code = 01A) 140 mmol/L   136-145                   

 

             POTASSIUM (test code = 01B) 3.1 mmol/L   3.6-5.1      L            

 

             CHLORIDE (test code = 04A) 96 mmol/L           L            

 

             CO2 (test code = 02A) 34 mmol/L    22-32        H            

 

             ANION GAP (test code = ANG) 13.1 mmol/L                            

 

             BUN (test code = 05D) 33 mg/dL     7-18         H            

 

             CREATININE (test code = 03E) 1.5 mg/dL    0.4-1.1      H           

 

 

             BUN/CREA (test code = BCR) 22           12-20        H            

 

             CALCIUM (test code = 09D) 9.1 mg/dL    8.3-9.5                   

 

             BILI TOTAL (test code = 11A) 1.4 mg/dL    0.2-1.0      H           

 

 

             PROTEIN (test code = 07D) 7.0 g/dL     6.4-8.2                   

 

             ALBUMIN (test code = 08D) 3.2 g/dL     3.5-4.8      L            

 

             GLOBULIN (test code = GLB) 3.8 g/dL     1.5-3.8                   

 

             ALB/GLOB (test code = AGRR) 0.8          1.0-2.6      L            

 

             ALK PHOS (test code = 35A) 111 IU/L                         

 

             AST (test code = 30A) 18 IU/L      <=42                      

 

             ALT (test code = 31A) 43 IU/L      <=78                      



PHOSPHORUS (P04)2019 06:13:00





             Test Item    Value        Reference Range Interpretation Comments

 

             PHOSPHORUS (test code = 43D) 4.0 mg/dL    2.7-4.6                  

 



CBC (INCLUDES AUTOMATED DIFFERENTIAL)2019 05:48:00





             Test Item    Value        Reference Range Interpretation Comments

 

             WBC (test code = WBC) 9.4 10\S\3/uL 4.5-11.0                  

 

             RBC (test code = RBC) 3.73 10\S\6/uL 4.20-5.60    L            

 

             HGB (test code = HBG) 10.8 g/dL    12.0-15.5    L            

 

             HCT (test code = HCT) 33.8 %       35.0-44.0    L            

 

             MCV (test code = MCV) 90.6 fL      81.0-99.0                 

 

             MCH (test code = MCH) 29.0 pg      27.0-31.0                 

 

             MCHC (test code = MCHC) 32.0 g/dL    32.0-36.0                 

 

             RDW (test code = RDW) 15.1 %       11.5-14.5    H            

 

             PLT (test code = PLT) 189 10\S\3/uL 130-400                   

 

             MPV (test code = MPV) 11.8 fL      9.4-12.4                  

 

             NEUTROP # (test code = NE#) 7.0 10\S\3/uL 1.6-8.0                  

 

 

             LYMPH # (test code = LY#) 1.6 10\S\3/uL 1.1-3.5                   

 

             MONOCYTE # (test code = MO#) 0.7 10\S\3/uL 0.0-1.1                 

  

 

             EOSINOPH # (test code = EO#) 0.1 10\S\3/uL 0.0-0.7                 

  

 

             BASOPHIL # (test code = BA#) 0.0 10\S\3/uL 0.0-0.3                 

  

 

             IG # (test code = IG#) 0.06 10\S\3/uL 0.00-0.06                 

 

             NRBC # (test code = NRBC#) 0.00 10\S\3/uL 0.00-0.01                

 

 

             NEUTROPH % (test code = NE%) 74.5 %       35.0-73.0    H           

 

 

             LYMPH % (test code = LY%) 16.5 %       20.0-55.0    L            

 

             MONO % (test code = MO%) 7.0 %        2.5-10.0                  

 

             EOSINOPH % (test code = EO%) 1.1 %        0.0-5.0                  

 

 

             BASOPHIL % (test code = BA%) 0.3 %        0.0-2.0                  

 

 

             IG % (test code = IG%) 0.6 %        0.0-0.8                   

 

             NRBC% (test code = NRBC%) 0.0 %        0.0-0.2                   

 

             MANDIFF (test code = MDIFF) NO           NO                        

 

             RBC MORPH (test code = RBCMOR)              NORMAL                 

   



COMPREHENSIVE METABOLIC [id number] 05:30:00





             Test Item    Value        Reference Range Interpretation Comments

 

             GLUCOSE (test code = 06D) 122 mg/dL           H            

 

             SODIUM (test code = 01A) 140 mmol/L   136-145                   

 

             POTASSIUM (test code = 01B) 3.8 mmol/L   3.6-5.1                   

 

             CHLORIDE (test code = 04A) 100 mmol/L                       

 

             CO2 (test code = 02A) 32 mmol/L    22-32                     

 

             ANION GAP (test code = ANG) 11.8 mmol/L                            

 

             BUN (test code = 05D) 29 mg/dL     7-18         H            

 

             CREATININE (test code = 03E) 1.5 mg/dL    0.4-1.1      H           

 

 

             BUN/CREA (test code = BCR) 20           12-20                     

 

             CALCIUM (test code = 09D) 9.0 mg/dL    8.3-9.5                   

 

             BILI TOTAL (test code = 11A) 1.3 mg/dL    0.2-1.0      H           

 

 

             PROTEIN (test code = 07D) 7.1 g/dL     6.4-8.2                   

 

             ALBUMIN (test code = 08D) 3.5 g/dL     3.5-4.8                   

 

             GLOBULIN (test code = GLB) 3.6 g/dL     1.5-3.8                   

 

             ALB/GLOB (test code = AGRR) 1.0          1.0-2.6                   

 

             ALK PHOS (test code = 35A) 119 IU/L                         

 

             AST (test code = 30A) 22 IU/L      <=42                      

 

             ALT (test code = 31A) 49 IU/L      <=78                      



CBC (INCLUDES AUTOMATED DIFFERENTIAL)2019 05:18:00





             Test Item    Value        Reference Range Interpretation Comments

 

             WBC (test code = WBC) 9.0 10\S\3/uL 4.5-11.0                  

 

             RBC (test code = RBC) 3.94 10\S\6/uL 4.20-5.60    L            

 

             HGB (test code = HBG) 11.4 g/dL    12.0-15.5    L            

 

             HCT (test code = HCT) 35.8 %       35.0-44.0                 

 

             MCV (test code = MCV) 90.9 fL      81.0-99.0                 

 

             MCH (test code = MCH) 28.9 pg      27.0-31.0                 

 

             MCHC (test code = MCHC) 31.8 g/dL    32.0-36.0    L            

 

             RDW (test code = RDW) 14.8 %       11.5-14.5    H            

 

             PLT (test code = PLT) 164 10\S\3/uL 130-400                   

 

             MPV (test code = MPV) 11.6 fL      9.4-12.4                  

 

             NEUTROP # (test code = NE#) 6.8 10\S\3/uL 1.6-8.0                  

 

 

             LYMPH # (test code = LY#) 1.4 10\S\3/uL 1.1-3.5                   

 

             MONOCYTE # (test code = MO#) 0.6 10\S\3/uL 0.0-1.1                 

  

 

             EOSINOPH # (test code = EO#) 0.1 10\S\3/uL 0.0-0.7                 

  

 

             BASOPHIL # (test code = BA#) 0.0 10\S\3/uL 0.0-0.3                 

  

 

             IG # (test code = IG#) 0.06 10\S\3/uL 0.00-0.06                 

 

             NRBC # (test code = NRBC#) 0.00 10\S\3/uL 0.00-0.01                

 

 

             NEUTROPH % (test code = NE%) 75.6 %       35.0-73.0    H           

 

 

             LYMPH % (test code = LY%) 15.9 %       20.0-55.0    L            

 

             MONO % (test code = MO%) 6.5 %        2.5-10.0                  

 

             EOSINOPH % (test code = EO%) 0.9 %        0.0-5.0                  

 

 

             BASOPHIL % (test code = BA%) 0.4 %        0.0-2.0                  

 

 

             IG % (test code = IG%) 0.7 %        0.0-0.8                   

 

             NRBC% (test code = NRBC%) 0.0 %        0.0-0.2                   

 

             MANDIFF (test code = MDIFF) NO           NO                        

 

             RBC MORPH (test code = RBCMOR)              NORMAL                 

   



URINALYSIS WITH [id number] 06:44:00





             Test Item    Value        Reference Range Interpretation Comments

 

             COLOR (test code = COLU) YELLOW       YELLOW                    

 

             CLARITY (test code = CLA) CLOUDY       CLEAR        A            

 

             GLUCOSE UR (test code = UA NEGATIVE     NEGATIVE                  



             GLUCOSE)                                            

 

             BILI UR (test code = BILE) NEGATIVE     NEGATIVE                  

 

             KETONES UR (test code = ACE) NEGATIVE     NEGATIVE                 

 

 

             SP GRAVITY (test code = SPGR) 1.014        1.005-1.030             

  

 

             PH UR (test code = PH) 6.0          4.5-8.0                   

 

             PROTEIN UR (test code = PU) TRACE        NEGATIVE     A            

 

             UROBIL UR (test code = UROQ) 0.2 EU/dL    0.2-1.0                  

 

 

             NITRITE UR (test code = NEGATIVE     NEGATIVE                  



             NITRITE)                                            

 

             BLOOD UR (test code = UA BLOOD) TRACE        NEGATIVE     A        

    

 

             LEUK ES UR (test code = LEUK) 2+           NEGATIVE     A          

  

 

             WBC UR (test code = UWBC) 12 /HPF      0-5          H            

 

             RBC UR (test code = URBC) 1 /HPF       0-2                       

 

             EPITH UR (test code = UEPC) FEW /LPF     FEW                       

 

             BACTERIA UR (test code = UBACT) MODERATE /HPF NONE         A       

     

 

             CAST UR (test code = CAST)  /LPF        NONE                      

 

             CRYSTAL UR (test code = CRYU)  / LPF       NONE                    

  

 

             MUCUS UR (test code = MUC)  / HPF       NONE                      

 

             AMORPH UR (test code = YASMEEN)  / HPF       NONE                     

 

 

             TRICH UR (test code = UTRICH)  /HPF        NONE                    

  

 

             YEAST UR (test code = UY)  /HPF        NONE                      

 

             SPERM UR (test code = USPERM)  /HPF        NONE                    

  



COMPREHENSIVE METABOLIC [id number] 05:24:00





             Test Item    Value        Reference Range Interpretation Comments

 

             GLUCOSE (test code = 06D) 105 mg/dL           H            

 

             SODIUM (test code = 01A) 138 mmol/L   136-145                   

 

             POTASSIUM (test code = 01B) 4.0 mmol/L   3.6-5.1                   

 

             CHLORIDE (test code = 04A) 104 mmol/L                       

 

             CO2 (test code = 02A) 25 mmol/L    22-32                     

 

             ANION GAP (test code = ANG) 13.0 mmol/L                            

 

             BUN (test code = 05D) 29 mg/dL     7-18         H            

 

             CREATININE (test code = 03E) 1.5 mg/dL    0.4-1.1      H           

 

 

             BUN/CREA (test code = BCR) 19           12-20                     

 

             CALCIUM (test code = 09D) 8.4 mg/dL    8.3-9.5                   

 

             BILI TOTAL (test code = 11A) 0.7 mg/dL    0.2-1.0                  

 

 

             PROTEIN (test code = 07D) 6.2 g/dL     6.4-8.2      L            

 

             ALBUMIN (test code = 08D) 3.1 g/dL     3.5-4.8      L            

 

             GLOBULIN (test code = GLB) 3.1 g/dL     1.5-3.8                   

 

             ALB/GLOB (test code = AGRR) 1.0          1.0-2.6                   

 

             ALK PHOS (test code = 35A) 104 IU/L                         

 

             AST (test code = 30A) 22 IU/L      <=42                      

 

             ALT (test code = 31A) 42 IU/L      <=78                      



CBC (INCLUDES AUTOMATED DIFFERENTIAL)2019 05:07:00





             Test Item    Value        Reference Range Interpretation Comments

 

             WBC (test code = WBC) 8.6 10\S\3/uL 4.5-11.0                  

 

             RBC (test code = RBC) 3.72 10\S\6/uL 4.20-5.60    L            

 

             HGB (test code = HBG) 10.8 g/dL    12.0-15.5    L            

 

             HCT (test code = HCT) 33.7 %       35.0-44.0    L            

 

             MCV (test code = MCV) 90.6 fL      81.0-99.0                 

 

             MCH (test code = MCH) 29.0 pg      27.0-31.0                 

 

             MCHC (test code = MCHC) 32.0 g/dL    32.0-36.0                 

 

             RDW (test code = RDW) 14.7 %       11.5-14.5    H            

 

             PLT (test code = PLT) 152 10\S\3/uL 130-400                   

 

             MPV (test code = MPV) 11.4 fL      9.4-12.4                  

 

             NEUTROP # (test code = NE#) 6.2 10\S\3/uL 1.6-8.0                  

 

 

             LYMPH # (test code = LY#) 1.6 10\S\3/uL 1.1-3.5                   

 

             MONOCYTE # (test code = MO#) 0.5 10\S\3/uL 0.0-1.1                 

  

 

             EOSINOPH # (test code = EO#) 0.2 10\S\3/uL 0.0-0.7                 

  

 

             BASOPHIL # (test code = BA#) 0.1 10\S\3/uL 0.0-0.3                 

  

 

             IG # (test code = IG#) 0.03 10\S\3/uL 0.00-0.06                 

 

             NRBC # (test code = NRBC#) 0.00 10\S\3/uL 0.00-0.01                

 

 

             NEUTROPH % (test code = NE%) 72.8 %       35.0-73.0                

 

 

             LYMPH % (test code = LY%) 18.5 %       20.0-55.0    L            

 

             MONO % (test code = MO%) 5.8 %        2.5-10.0                  

 

             EOSINOPH % (test code = EO%) 2.0 %        0.0-5.0                  

 

 

             BASOPHIL % (test code = BA%) 0.6 %        0.0-2.0                  

 

 

             IG % (test code = IG%) 0.3 %        0.0-0.8                   

 

             NRBC% (test code = NRBC%) 0.0 %        0.0-0.2                   

 

             MANDIFF (test code = MDIFF) NO           NO                        

 

             RBC MORPH (test code = RBCMOR)              NORMAL                 

   



U/S KIDNEY (RENAL)2019 23:20:42LOCATION: [id number]: 63-year-old female 
who presents with acute nontraumatic kidney injury.COMMENT:Sonographic imaging 
of this patient's retroperitoneum was performed.The right kidney measures 9.9 x 
5.9 x 6.4 cm with a 13 mm cortical thickness. Acyst is seen in the upper pole 
measuring 23 x 19 x 19 mm, and a second cyst isseen in the lower pole measuring 
18 x 16 x 16 mm.The left kidney measures 9.4 x 5.3 x 5.6 cm with a 11 mm 
cortical thickness. Nocysts or masses are seen in the left kidney.Cortical 
echotexture of the kidneys otherwise is unremarkable.There is no evidence of 
hydronephrosis of either kidney.In the urinary bladder only the left urine jet 
was observed on the color flowstudy. The bladder otherwise is 
unremarkable.IMPRESSION:Cystic changes are seen in the upper pole and lower pole
ofthis patient'sright kidney. No gross abnormalities are seen elsewhere in 
either kidney.The urinary bladder is unremarkable. Only the left urine jet was 
observed onthe color flow study.TROPONIN -12-13 07:14:00





             Test Item    Value        Reference Range Interpretation Comments

 

             TROPONIN I (test code = A84) <0.015 ng/mL 0.000-0.045              

 



COMPREHENSIVE METABOLIC [id number] 05:28:00





             Test Item    Value        Reference Range Interpretation Comments

 

             GLUCOSE (test code = 06D) 110 mg/dL           H            

 

             SODIUM (test code = 01A) 138 mmol/L   136-145                   

 

             POTASSIUM (test code = 01B) 3.9 mmol/L   3.6-5.1                   

 

             CHLORIDE (test code = 04A) 106 mmol/L                       

 

             CO2 (test code = 02A) 24 mmol/L    22-32                     

 

             ANION GAP (test code = ANG) 11.9 mmol/L                            

 

             BUN (test code = 05D) 22 mg/dL     7-18         H            

 

             CREATININE (test code = 03E) 1.5 mg/dL    0.4-1.1      H           

 

 

             BUN/CREA (test code = BCR) 15           12-20                     

 

             CALCIUM (test code = 09D) 8.2 mg/dL    8.3-9.5      L            

 

             BILI TOTAL (test code = 11A) 0.6 mg/dL    0.2-1.0                  

 

 

             PROTEIN (test code = 07D) 6.0 g/dL     6.4-8.2      L            

 

             ALBUMIN (test code = 08D) 2.9 g/dL     3.5-4.8      L            

 

             GLOBULIN (test code = GLB) 3.1 g/dL     1.5-3.8                   

 

             ALB/GLOB (test code = AGRR) 0.9          1.0-2.6      L            

 

             ALK PHOS (test code = 35A) 96 IU/L                          

 

             AST (test code = 30A) 19 IU/L      <=42                      

 

             ALT (test code = 31A) 35 IU/L      <=78                      



CBC (INCLUDES AUTOMATED DIFFERENTIAL)2019 05:14:00





             Test Item    Value        Reference Range Interpretation Comments

 

             WBC (test code = WBC) 7.0 10\S\3/uL 4.5-11.0                  

 

             RBC (test code = RBC) 3.64 10\S\6/uL 4.20-5.60    L            

 

             HGB (test code = HBG) 10.6 g/dL    12.0-15.5    L            

 

             HCT (test code = HCT) 33.5 %       35.0-44.0    L            

 

             MCV (test code = MCV) 92.0 fL      81.0-99.0                 

 

             MCH (test code = MCH) 29.1 pg      27.0-31.0                 

 

             MCHC (test code = MCHC) 31.6 g/dL    32.0-36.0    L            

 

             RDW (test code = RDW) 14.9 %       11.5-14.5    H            

 

             PLT (test code = PLT) 145 10\S\3/uL 130-400                   

 

             MPV (test code = MPV) 11.4 fL      9.4-12.4                  

 

             NEUTROP # (test code = NE#) 4.2 10\S\3/uL 1.6-8.0                  

 

 

             LYMPH # (test code = LY#) 2.1 10\S\3/uL 1.1-3.5                   

 

             MONOCYTE # (test code = MO#) 0.5 10\S\3/uL 0.0-1.1                 

  

 

             EOSINOPH # (test code = EO#) 0.1 10\S\3/uL 0.0-0.7                 

  

 

             BASOPHIL # (test code = BA#) 0.0 10\S\3/uL 0.0-0.3                 

  

 

             IG # (test code = IG#) 0.04 10\S\3/uL 0.00-0.06                 

 

             NRBC # (test code = NRBC#) 0.00 10\S\3/uL 0.00-0.01                

 

 

             NEUTROPH % (test code = NE%) 59.7 %       35.0-73.0                

 

 

             LYMPH % (test code = LY%) 30.1 %       20.0-55.0                 

 

             MONO % (test code = MO%) 7.0 %        2.5-10.0                  

 

             EOSINOPH % (test code = EO%) 2.0 %        0.0-5.0                  

 

 

             BASOPHIL % (test code = BA%) 0.6 %        0.0-2.0                  

 

 

             IG % (test code = IG%) 0.6 %        0.0-0.8                   

 

             NRBC% (test code = NRBC%) 0.0 %        0.0-0.2                   

 

             MANDIFF (test code = MDIFF) NO           NO                        

 

             RBC MORPH (test code = RBCMOR)              NORMAL                 

   



COMPREHENSIVE METABOLIC PAN2019-08-15 04:58:00





             Test Item    Value        Reference Range Interpretation Comments

 

             GLUCOSE (test code = 06D) 112 mg/dL           H            

 

             SODIUM (test code = 01A) 143 mmol/L   136-145                   

 

             POTASSIUM (test code = 01B) 4.4 mmol/L   3.6-5.1                   

 

             CHLORIDE (test code = 04A) 108 mmol/L          H            

 

             CO2 (test code = 02A) 27 mmol/L    22-32                     

 

             ANION GAP (test code = ANG) 12.4 mmol/L                            

 

             BUN (test code = 05D) 19 mg/dL     7-18         H            

 

             CREATININE (test code = 03E) 1.4 mg/dL    0.4-1.1      H           

 

 

             BUN/CREA (test code = BCR) 14           12-20                     

 

             CALCIUM (test code = 09D) 8.5 mg/dL    8.3-9.5                   

 

             BILI TOTAL (test code = 11A) 0.9 mg/dL    0.2-1.0                  

 

 

             PROTEIN (test code = 07D) 6.2 g/dL     6.4-8.2      L            

 

             ALBUMIN (test code = 08D) 2.9 g/dL     3.5-4.8      L            

 

             GLOBULIN (test code = GLB) 3.3 g/dL     1.5-3.8                   

 

             ALB/GLOB (test code = AGRR) 0.9          1.0-2.6      L            

 

             ALK PHOS (test code = 35A) 95 IU/L                          

 

             AST (test code = 30A) 19 IU/L      <=42                      

 

             ALT (test code = 31A) 40 IU/L      <=78                      



CBC (INCLUDES AUTOMATED DIFFERENTIAL)2019-08-15 04:51:00





             Test Item    Value        Reference Range Interpretation Comments

 

             WBC (test code = WBC) 8.2 10\S\3/uL 4.5-11.0                  

 

             RBC (test code = RBC) 3.48 10\S\6/uL 4.20-5.60    L            

 

             HGB (test code = HBG) 10.3 g/dL    12.0-15.5    L            

 

             HCT (test code = HCT) 32.4 %       35.0-44.0    L            

 

             MCV (test code = MCV) 93.1 fL      81.0-99.0                 

 

             MCH (test code = MCH) 29.6 pg      27.0-31.0                 

 

             MCHC (test code = MCHC) 31.8 g/dL    32.0-36.0    L            

 

             RDW (test code = RDW) 15.5 %       11.5-14.5    H            

 

             PLT (test code = PLT) 163 10\S\3/uL 130-400                   

 

             MPV (test code = MPV) 11.8 fL      9.4-12.4                  

 

             NEUTROP # (test code = NE#) 5.3 10\S\3/uL 1.6-8.0                  

 

 

             LYMPH # (test code = LY#) 2.0 10\S\3/uL 1.1-3.5                   

 

             MONOCYTE # (test code = MO#) 0.6 10\S\3/uL 0.0-1.1                 

  

 

             EOSINOPH # (test code = EO#) 0.2 10\S\3/uL 0.0-0.7                 

  

 

             BASOPHIL # (test code = BA#) 0.0 10\S\3/uL 0.0-0.3                 

  

 

             IG # (test code = IG#) 0.03 10\S\3/uL 0.00-0.06                 

 

             NRBC # (test code = NRBC#) 0.00 10\S\3/uL 0.00-0.01                

 

 

             NEUTROPH % (test code = NE%) 65.5 %       35.0-73.0                

 

 

             LYMPH % (test code = LY%) 24.2 %       20.0-55.0                 

 

             MONO % (test code = MO%) 7.2 %        2.5-10.0                  

 

             EOSINOPH % (test code = EO%) 2.2 %        0.0-5.0                  

 

 

             BASOPHIL % (test code = BA%) 0.5 %        0.0-2.0                  

 

 

             IG % (test code = IG%) 0.4 %        0.0-0.8                   

 

             NRBC% (test code = NRBC%) 0.0 %        0.0-0.2                   

 

             MANDIFF (test code = MDIFF) NO           NO                        



CARDIAC [id number] 23:10:00





             Test Item    Value        Reference Range Interpretation Comments

 

             TROPONIN I (test code = A84) <0.015 ng/mL 0.000-0.045              

 



U/S VENOUS DOPPLER IVON LOW [id number] 22:23:14LOCATION: [id number]: 
63-year-old female who presents with bilateral leg swelling.COMMENT: Sonographic
imaging of the venous anatomy in both of this patient's legs wasobtained 
utilizing grayscale, color-flow, and Doppler waveform imagingmodalities.The 
common femoral veins, superficial femoral veins, popliteal veins, 
posteriortibial veins, anterior tibial veins, and peroneal veins in both legs 
wereincluded in the study.The anatomy exhibits normal compressibility on 
grayscale study. No suspiciousfilling defects are seen on the color flow study. 
Appropriate waveform responseas are noted on the Dopplerstudy during 
augmentation maneuvers and duringquiet respiration. IMPRESSION:There is no 
sonographic evidence of venous thrombosis in either of thispatient's legs.
CARDIAC [id number] 16:50:00





             Test Item    Value        Reference Range Interpretation Comments

 

             TROPONIN I (test code = A84) <0.015 ng/mL 0.000-0.045              

 



BRAIN NATRIURETIC [id number] 14:39:00





             Test Item    Value        Reference Range Interpretation Comments

 

             proBNP (test code = PBNP) 1337 pg/mL   0-125        H            



THYROID PANEL/SCREEN (TSH)2019 12:05:00





             Test Item    Value        Reference Range Interpretation Comments

 

             TSH (test code = A57) 0.989 uIU/mL 0.358-3.740               



[id number] 12:02:00





             Test Item    Value        Reference Range Interpretation Comments

 

             CPK (test code = 32A) 98 IU/L                          



AMYLASE AND [id number] 12:02:00





             Test Item    Value        Reference Range Interpretation Comments

 

             AMYLASE (test code = 10A) 19 U/L              L            

 

             LIPASE (test code = 60A) 67 IU/L             L            



COMPREHENSIVE METABOLIC [id number] 12:02:00





             Test Item    Value        Reference Range Interpretation Comments

 

             GLUCOSE (test code = 06D) 109 mg/dL           H            

 

             SODIUM (test code = 01A) 142 mmol/L   136-145                   

 

             POTASSIUM (test code = 01B) 4.2 mmol/L   3.6-5.1                   

 

             CHLORIDE (test code = 04A) 109 mmol/L          H            

 

             CO2 (test code = 02A) 26 mmol/L    22-32                     

 

             ANION GAP (test code = ANG) 11.2 mmol/L                            

 

             BUN (test code = 05D) 16 mg/dL     7-18                      

 

             CREATININE (test code = 03E) 1.3 mg/dL    0.4-1.1      H           

 

 

             BUN/CREA (test code = BCR) 13           12-20                     

 

             CALCIUM (test code = 09D) 8.7 mg/dL    8.3-9.5                   

 

             BILI TOTAL (test code = 11A) 1.3 mg/dL    0.2-1.0      H           

 

 

             PROTEIN (test code = 07D) 6.5 g/dL     6.4-8.2                   

 

             ALBUMIN (test code = 08D) 3.2 g/dL     3.5-4.8      L            

 

             GLOBULIN (test code = GLB) 3.3 g/dL     1.5-3.8                   

 

             ALB/GLOB (test code = AGRR) 1.0          1.0-2.6                   

 

             ALK PHOS (test code = 35A) 101 IU/L                         

 

             AST (test code = 30A) 21 IU/L      <=42                      

 

             ALT (test code = 31A) 41 IU/L      <=78                      



TROPONIN [id number] 11:57:00





             Test Item    Value        Reference Range Interpretation Comments

 

             TROPONIN I (test code = A84) <0.015 ng/mL 0.000-0.045              

 



XR CHEST 1 VIEW [id number] 11:55:22EXAM: XR CHEST 1 VIEW 
PORTABLE.LOCATION: D4.HISTORY: [id number]: Chest pain.COMPARISON: Radiograph dated
2019.TECHNIQUE: Single AP view of the chest was obtained. FINDINGS:The 
heart is enlarged in size. Small left pleural effusion and left basilaropacities
are present. There is elevation of the right hemidiaphragm. No acuteosseous 
abnormality is identified.IMPRESSION:Small left pleural effusion with left 
basilar opacities, which may representatelectasis or infiltrates.Cardiomegaly.
PRO TIME AND [id number] 11:43:00





             Test Item    Value        Reference Range Interpretation Comments

 

             PT (test code = 13.4 s       9.8-13.6                  



             TT)                                                 

 

             INR (test code = 1.2                                    



             INR)                                                

 

             INRH (test code =  **** SUGGESTED THERAPEUTIC                      

     



             INRH)        RANGE FOR INR: **** 2.5 -                           



                          3.5 ** For Patients with                           



                          Prosthetic Valves or                           



                          Patients with recurrent                           



                          Thromboembolic Events **                           



                          2.0 - 3.0 ** For Most Other                           



                          Applications **                           

 

             PTT (test code = 30.4 s       20.2-38.0                 



             PTT)                                                

 

             PTTH (test code = **** To monitor the                           



             PTTH)        effectiveness of heparin,                           



                          we offer the Anti-Xa                           



                          (Heparin Assay). It can be                           



                          used for either                           



                          unfractionated or LMW                           



                          Heparin. Order Code is                           



                          ANTI-XA ****                           



CBC (INCLUDES AUTOMATED DIFFERENTIAL)2019 11:36:00





             Test Item    Value        Reference Range Interpretation Comments

 

             WBC (test code = WBC) 7.6 10\S\3/uL 4.5-11.0                  

 

             RBC (test code = RBC) 3.53 10\S\6/uL 4.20-5.60    L            

 

             HGB (test code = HBG) 10.3 g/dL    12.0-15.5    L            

 

             HCT (test code = HCT) 33.5 %       35.0-44.0    L            

 

             MCV (test code = MCV) 94.9 fL      81.0-99.0                 

 

             MCH (test code = MCH) 29.2 pg      27.0-31.0                 

 

             MCHC (test code = MCHC) 30.7 g/dL    32.0-36.0    L            

 

             RDW (test code = RDW) 15.4 %       11.5-14.5    H            

 

             PLT (test code = PLT) 164 10\S\3/uL 130-400                   

 

             MPV (test code = MPV) 11.7 fL      9.4-12.4                  

 

             NEUTROP # (test code = NE#) 5.6 10\S\3/uL 1.6-8.0                  

 

 

             LYMPH # (test code = LY#) 1.4 10\S\3/uL 1.1-3.5                   

 

             MONOCYTE # (test code = MO#) 0.4 10\S\3/uL 0.0-1.1                 

  

 

             EOSINOPH # (test code = EO#) 0.1 10\S\3/uL 0.0-0.7                 

  

 

             BASOPHIL # (test code = BA#) 0.0 10\S\3/uL 0.0-0.3                 

  

 

             IG # (test code = IG#) 0.04 10\S\3/uL 0.00-0.06                 

 

             NRBC # (test code = NRBC#) 0.00 10\S\3/uL 0.00-0.01                

 

 

             NEUTROPH % (test code = NE%) 73.9 %       35.0-73.0    H           

 

 

             LYMPH % (test code = LY%) 18.0 %       20.0-55.0    L            

 

             MONO % (test code = MO%) 5.7 %        2.5-10.0                  

 

             EOSINOPH % (test code = EO%) 1.6 %        0.0-5.0                  

 

 

             BASOPHIL % (test code = BA%) 0.3 %        0.0-2.0                  

 

 

             IG % (test code = IG%) 0.5 %        0.0-0.8                   

 

             NRBC% (test code = NRBC%) 0.0 %        0.0-0.2                   

 

             MANDIFF (test code = MDIFF) NO           NO                        

 

             RBC MORPH (test code = RBCMOR)              NORMAL                 

   



CBC WITH [id number] 15:29:00





             Test Item    Value        Reference Range Interpretation Comments

 

             WBC (test code = WBC) 9.7 10\S\3/uL 4.5-11.0                  

 

             RBC (test code = RBC) 3.73 10\S\6/uL 4.20-5.60    L            

 

             HGB (test code = HBG) 11.0 g/dL    12.0-15.5    L            

 

             HCT (test code = HCT) 34.1 %       35.0-44.0    L            

 

             MCV (test code = MCV) 91.4 fL      81.0-99.0                 

 

             MCH (test code = MCH) 29.5 pg      27.0-31.0                 

 

             MCHC (test code = MCHC) 32.3 g/dL    32.0-36.0                 

 

             RDW (test code = RDW) 14.2 %       11.5-14.5                 

 

             PLT (test code = PLT) 116 10\S\3/uL 130-400      L            

 

             MPV (test code = MPV) 11.8 fL      9.4-12.4                  

 

             NEUTROP # (test code = NE#) 7.9 10\S\3/uL 1.6-8.0                  

 

 

             LYMPH # (test code = LY#) 1.0 10\S\3/uL 1.1-3.5      L            

 

             MONOCYTE # (test code = 0.6 10\S\3/uL 0.0-1.1                   



             MO#)                                                

 

             EOSINOPH # (test code = 0.1 10\S\3/uL 0.0-0.7                   



             EO#)                                                

 

             BASOPHIL # (test code = 0.0 10\S\3/uL 0.0-0.3                   



             BA#)                                                

 

             IG # (test code = IG#) 0.04 10\S\3/uL 0.00-0.06                 

 

             NRBC # (test code = NRBC#) 0.00 10\S\3/uL 0.00-0.01                

 

 

             NEUTROPH % (test code = 81.6 %       35.0-73.0    H            



             NE%)                                                

 

             LYMPH % (test code = LY%) 10.3 %       20.0-55.0    L            

 

             MONO % (test code = MO%) 6.4 %        2.5-10.0                  

 

             EOSINOPH % (test code = 1.0 %        0.0-5.0                   



             EO%)                                                

 

             BASOPHIL % (test code = 0.3 %        0.0-2.0                   



             BA%)                                                

 

             IG % (test code = IG%) 0.4 %        0.0-0.8                   

 

             NRBC% (test code = NRBC%) 0.0 %        0.0-0.2                   

 

             PLT EST (test code = ADEQUATE     ADEQUATE                  



             PLTEST)                                             

 

             PLT MORPH (test code = NORMAL (1.5-3 um) NORMAL                    



             PLTMOR)                                             



BASIC METABOLIC [id number] 15:26:00





             Test Item    Value        Reference Range Interpretation Comments

 

             GLUCOSE (test code = 06D) 118 mg/dL           H            

 

             SODIUM (test code = 01A) 136 mmol/L   136-145                   

 

             POTASSIUM (test code = 01B) 4.2 mmol/L   3.6-5.1                   

 

             CHLORIDE (test code = 04A) 106 mmol/L                       

 

             CO2 (test code = 02A) 24 mmol/L    22-32                     

 

             ANION GAP (test code = ANG) 10.2 mmol/L                            

 

             BUN (test code = 05D) 38 mg/dL     7-18         H            

 

             CREATININE (test code = 03E) 1.6 mg/dL    0.4-1.1      H           

 

 

             BUN/CREA (test code = BCR) 23           12-20        H            

 

             CALCIUM (test code = 09D) 9.2 mg/dL    8.3-9.5                   



CARDIAC [id number] 06:04:00





             Test Item    Value        Reference Range Interpretation Comments

 

             TROPONIN I (test code = A84) <0.015 ng/mL 0.000-0.045              

 



BASIC METABOLIC [id number] 05:52:00





             Test Item    Value        Reference Range Interpretation Comments

 

             GLUCOSE (test code = 06D) 171 mg/dL           H            

 

             SODIUM (test code = 01A) 138 mmol/L   136-145                   

 

             POTASSIUM (test code = 01B) 4.0 mmol/L   3.6-5.1                   

 

             CHLORIDE (test code = 04A) 107 mmol/L                       

 

             CO2 (test code = 02A) 23 mmol/L    22-32                     

 

             ANION GAP (test code = ANG) 12.0 mmol/L                            

 

             BUN (test code = 05D) 19 mg/dL     7-18         H            

 

             CREATININE (test code = 03E) 1.2 mg/dL    0.4-1.1      H           

 

 

             BUN/CREA (test code = BCR) 15           12-20                     

 

             CALCIUM (test code = 09D) 8.5 mg/dL    8.3-9.5                   



CBC (INCLUDES AUTOMATED DIFFERENTIAL)2019 05:30:00





             Test Item    Value        Reference Range Interpretation Comments

 

             WBC (test code = WBC) 14.0 10\S\3/uL 4.5-11.0     H            

 

             RBC (test code = RBC) 3.64 10\S\6/uL 4.20-5.60    L            

 

             HGB (test code = HBG) 10.8 g/dL    12.0-15.5    L            

 

             HCT (test code = HCT) 33.2 %       35.0-44.0    L            

 

             MCV (test code = MCV) 91.2 fL      81.0-99.0                 

 

             MCH (test code = MCH) 29.7 pg      27.0-31.0                 

 

             MCHC (test code = MCHC) 32.5 g/dL    32.0-36.0                 

 

             RDW (test code = RDW) 14.0 %       11.5-14.5                 

 

             PLT (test code = PLT) 143 10\S\3/uL 130-400                   

 

             MPV (test code = MPV) 11.4 fL      9.4-12.4                  

 

             NEUTROP # (test code = NE#) 12.1 10\S\3/uL 1.6-8.0      H          

  

 

             LYMPH # (test code = LY#) 0.9 10\S\3/uL 1.1-3.5      L            

 

             MONOCYTE # (test code = MO#) 0.9 10\S\3/uL 0.0-1.1                 

  

 

             EOSINOPH # (test code = EO#) 0.0 10\S\3/uL 0.0-0.7                 

  

 

             BASOPHIL # (test code = BA#) 0.0 10\S\3/uL 0.0-0.3                 

  

 

             IG # (test code = IG#) 0.10 10\S\3/uL 0.00-0.06    H            

 

             NRBC # (test code = NRBC#) 0.00 10\S\3/uL 0.00-0.01                

 

 

             NEUTROPH % (test code = NE%) 86.2 %       35.0-73.0    H           

 

 

             LYMPH % (test code = LY%) 6.3 %        20.0-55.0    L            

 

             MONO % (test code = MO%) 6.6 %        2.5-10.0                  

 

             EOSINOPH % (test code = EO%) 0.0 %        0.0-5.0                  

 

 

             BASOPHIL % (test code = BA%) 0.2 %        0.0-2.0                  

 

 

             IG % (test code = IG%) 0.7 %        0.0-0.8                   

 

             NRBC% (test code = NRBC%) 0.0 %        0.0-0.2                   

 

             MANDIFF (test code = MDIFF) NO           NO                        

 

             RBC MORPH (test code = RBCMOR)              NORMAL                 

   



CARDIAC [id number] 23:08:00





             Test Item    Value        Reference Range Interpretation Comments

 

             TROPONIN I (test code = A84) <0.015 ng/mL 0.000-0.045              

 



CT DISSECTION [id number] 19:26:35Exam: CT thorax PE protocol.Location: 
12History: [id number]: Chest painTechnique: Enhanced spiral slices were taken from
the apices of the lungs,through the upper abdomen utilizing a pulmonary embolism
protocol. Multiplanarreformations were performed. 100cc of Omnipaque were used. 
One or more of thefollowing radiation dose reduction techniques was used: 
automated exposurecontrol, adjustment of mA and/or KV according to patient size,
and/orutilization of iterative reconstruction technique.Findings:Nofilling 
defects are seen in the main pulmonary arteries. The pulmonaryvasculature is 
normal. No pulmonary venous congestion or arterial hypertensionis seen.The lungs
are clear. No infiltration or effusion is seen. No mass or nodule isseen.The 
mediastinum and the pulmonary kandis are normal. Calcified granulomas arenoted. No
lymphadenopathy is present. The heart size is normal.No pericardialeffusion is s
een.The visualized upper abdominal organs are unremarkable.Impression:1. 
Negative for pulmonary embolism.2. No acute disease.THYROID PANEL/SCREEN (TSH)
2019 18:29:00





             Test Item    Value        Reference Range Interpretation Comments

 

             TSH (test code = A57) 0.706 uIU/mL 0.358-3.740               



LIVER [id number] 18:28:00





             Test Item    Value        Reference Range Interpretation Comments

 

             BILI TOTAL (test code = 11A) 0.9 mg/dL    0.2-1.0                  

 

 

             BILI DIRCT (test code = 12A) 0.2 mg/dL    0.0-0.2                  

 

 

             BILI INDIR (test code = BILII) 0.7 mg/dL    <=0.8                  

   

 

             PROTEIN (test code = 07D) 7.7 g/dL     6.4-8.2                   

 

             ALBUMIN (test code = 08D) 3.8 g/dL     3.5-4.8                   

 

             GLOBULIN (test code = GLB) 3.9 g/dL     1.5-3.8      H            

 

             ALB/GLOB (test code = AGRR) 1.0          1.0-2.6                   

 

             ALK PHOS (test code = 35A) 106 IU/L                         

 

             AST (test code = 30A) 25 IU/L      <=42                      

 

             ALT (test code = 31A) 21 IU/L      <=78                      



BRAIN NATRIURETIC [id number] 18:19:00





             Test Item    Value        Reference Range Interpretation Comments

 

             proBNP (test code = PBNP) 518 pg/mL    0-125        H            



[id number] 18:15:00





             Test Item    Value        Reference Range Interpretation Comments

 

             MAGNESIUM (test code = 48A) 1.9 mg/dL    1.8-2.4                   



[id number] 17:40:00





             Test Item    Value        Reference Range Interpretation Comments

 

             D-DIMER (test code = <200 ng/mL D-DU 0-234                     



             DDI)                                                

 

             D-DIMER COMMENT (test *Level to rule out                           



             code = DDCOM) DVT or PE: <235 ng/mL                           



                          D-DU*                                  



CARDIAC [id number] 17:31:00





             Test Item    Value        Reference Range Interpretation Comments

 

             TROPONIN I (test code = A84) <0.015 ng/mL 0.000-0.045              

 



BASIC METABOLIC [id number] 17:27:00





             Test Item    Value        Reference Range Interpretation Comments

 

             GLUCOSE (test code = 06D) 114 mg/dL           H            

 

             SODIUM (test code = 01A) 142 mmol/L   136-145                   

 

             POTASSIUM (test code = 01B) 4.0 mmol/L   3.6-5.1                   

 

             CHLORIDE (test code = 04A) 111 mmol/L          H            

 

             CO2 (test code = 02A) 26 mmol/L    22-32                     

 

             ANION GAP (test code = ANG) 9.0 mmol/L                             

 

             BUN (test code = 05D) 19 mg/dL     7-18         H            

 

             CREATININE (test code = 03E) 1.2 mg/dL    0.4-1.1      H           

 

 

             BUN/CREA (test code = BCR) 15           12-20                     

 

             CALCIUM (test code = 09D) 9.2 mg/dL    8.3-9.5                   



CBC (INCLUDES AUTOMATED DIFFERENTIAL)2019 17:26:00





             Test Item    Value        Reference Range Interpretation Comments

 

             WBC (test code = WBC) 12.6 10\S\3/uL 4.5-11.0     H            

 

             RBC (test code = RBC) 4.04 10\S\6/uL 4.20-5.60    L            

 

             HGB (test code = HBG) 11.9 g/dL    12.0-15.5    L            

 

             HCT (test code = HCT) 37.6 %       35.0-44.0                 

 

             MCV (test code = MCV) 93.1 fL      81.0-99.0                 

 

             MCH (test code = MCH) 29.5 pg      27.0-31.0                 

 

             MCHC (test code = MCHC) 31.6 g/dL    32.0-36.0    L            

 

             RDW (test code = RDW) 13.8 %       11.5-14.5                 

 

             PLT (test code = PLT) 152 10\S\3/uL 130-400                   

 

             MPV (test code = MPV) 11.3 fL      9.4-12.4                  

 

             NEUTROP # (test code = NE#) 10.7 10\S\3/uL 1.6-8.0      H          

  

 

             LYMPH # (test code = LY#) 1.1 10\S\3/uL 1.1-3.5                   

 

             MONOCYTE # (test code = MO#) 0.6 10\S\3/uL 0.0-1.1                 

  

 

             EOSINOPH # (test code = EO#) 0.1 10\S\3/uL 0.0-0.7                 

  

 

             BASOPHIL # (test code = BA#) 0.0 10\S\3/uL 0.0-0.3                 

  

 

             IG # (test code = IG#) 0.06 10\S\3/uL 0.00-0.06                 

 

             NRBC # (test code = NRBC#) 0.00 10\S\3/uL 0.00-0.01                

 

 

             NEUTROPH % (test code = NE%) 85.2 %       35.0-73.0    H           

 

 

             LYMPH % (test code = LY%) 8.4 %        20.0-55.0    L            

 

             MONO % (test code = MO%) 5.0 %        2.5-10.0                  

 

             EOSINOPH % (test code = EO%) 0.6 %        0.0-5.0                  

 

 

             BASOPHIL % (test code = BA%) 0.3 %        0.0-2.0                  

 

 

             IG % (test code = IG%) 0.5 %        0.0-0.8                   

 

             NRBC% (test code = NRBC%) 0.0 %        0.0-0.2                   

 

             MANDIFF (test code = MDIFF) NO           NO                        



PTT (PARTIAL THROMBOPLASTIN TIME)2019 17:26:00





             Test Item    Value        Reference Range Interpretation Comments

 

             PTT (test code = 31.9 s       20.2-38.0                 



             PTT)                                                

 

             PTTH (test code = **** To monitor the                           



             PTTH)        effectiveness of heparin,                           



                          we offer the Anti-Xa                           



                          (Heparin Assay). It can be                           



                          used for either                           



                          unfractionated or LMW                           



                          Heparin. Order Code is                           



                          ANTI-XA ****                           



PROTHROMBIN [id number] 17:26:00





             Test Item    Value        Reference Range Interpretation Comments

 

             PT (test code = 12.0 s       9.8-13.6                  



             TT)                                                 

 

             INR (test code = 1.1                                    



             INR)                                                

 

             INRH (test code =  **** SUGGESTED THERAPEUTIC                      

     



             INRH)        RANGE FOR INR: **** 2.5 -                           



                          3.5 ** For Patients with                           



                          Prosthetic Valves or                           



                          Patients with recurrent                           



                          Thromboembolic Events **                           



                          2.0 - 3.0 ** For Most Other                           



                          Applications **                           



XR CHEST 1 VIEW [id number] 17:04:28Portable AP chest, 1 viewLocation 
Code: A0HLWRGKET HISTORY: Chest painCOMPARISON: NoneCOMMENT: Mild atelectasis is
present within the lung bases. The costophrenic angles aresharp. The 
cardiomediastinalsilhouette is unremarkable. The bones are intact.IMPRESSION: 
Mild bibasilar atelectasis. Otherwise, no acute abnormalityCHEM [id number] 
16:51:00





             Test Item    Value        Reference Range Interpretation Comments

 

             Creatinine Lvl (test code = Creatinine 1.15         0.50-1.40      

           



             Lvl)                                                



[facility]2017-01-24 16:51:00





             Test Item    Value        Reference Range Interpretation Comments

 

             BUN (test code = BUN) 16                                 



[facility]2017-01-24 16:51:00





             Test Item    Value        Reference Range Interpretation Comments

 

             Chloride Lvl (test code = Chloride Lvl) 109                  

            



[facility]2017-01-24 16:51:00





             Test Item    Value        Reference Range Interpretation Comments

 

             Potassium Lvl (test code = Potassium 4.3          3.5-5.1          

         



             Lvl)                                                



[facility]2017-01-24 16:51:00





             Test Item    Value        Reference Range Interpretation Comments

 

             Sodium Lvl (test code = Sodium Lvl) 144          135-145           

        



[facility]2017-01-24 16:51:00





             Test Item    Value        Reference Range Interpretation Comments

 

             CO2 (test code = CO2) 28           -32                     



[facility]2017-01-24 16:51:00





             Test Item    Value        Reference Range Interpretation Comments

 

             Calcium Lvl (test code = Calcium Lvl) 8.4          8.5-10.5        

          



[facility]2017-01-24 16:51:00





             Test Item    Value        Reference Range Interpretation Comments

 

             AGAP (test code = AGAP) 11.3         10.0-20.0                 



[facility]2017-01-24 16:51:00





             Test Item    Value        Reference Range Interpretation Comments

 

             Glucose Lvl (test code = Glucose Lvl) 109          70-99           

          



[facility]2017-01-24 16:51:00





             Test Item    Value        Reference Range Interpretation Comments

 

             eGFR (test code = eGFR) 52                                     



[facility]2017-01-24 16:51:00





             Test Item    Value        Reference Range Interpretation Comments

 

             Creatinine Lvl (test code = Creatinine 1.15         0.50-1.40      

           



             Lvl)                                                



[facility]2017-01-24 16:51:00





             Test Item    Value        Reference Range Interpretation Comments

 

             BUN (test code = BUN) 16                                 



[facility]2017-01-24 16:51:00





             Test Item    Value        Reference Range Interpretation Comments

 

             Chloride Lvl (test code = Chloride Lvl) 109                  

            



[facility]2017-01-24 16:51:00





             Test Item    Value        Reference Range Interpretation Comments

 

             Potassium Lvl (test code = Potassium 4.3          3.5-5.1          

         



             Lvl)                                                



[facility]2017-01-24 16:51:00





             Test Item    Value        Reference Range Interpretation Comments

 

             Sodium Lvl (test code = Sodium Lvl) 144          135-145           

        



[facility]2017-01-24 16:51:00





             Test Item    Value        Reference Range Interpretation Comments

 

             CO2 (test code = CO2)                      



[facility]2017-01-24 16:51:00





             Test Item    Value        Reference Range Interpretation Comments

 

             Calcium Lvl (test code = Calcium Lvl) 8.4          8.5-10.5        

          



[facility]2017-01-24 16:51:00





             Test Item    Value        Reference Range Interpretation Comments

 

             AGAP (test code = AGAP) 11.3         10.0-20.0                 



[facility]2017-01-24 16:51:00





             Test Item    Value        Reference Range Interpretation Comments

 

             Glucose Lvl (test code = Glucose Lvl) 109          70-99           

          



[facility]2017-01-24 16:51:00





             Test Item    Value        Reference Range Interpretation Comments

 

             eGFR (test code = eGFR) 52                                     



[facility]2017-01-24 16:51:00





             Test Item    Value        Reference Range Interpretation Comments

 

             Creatinine Lvl (test code = Creatinine 1.15         0.50-1.40      

           



             Lvl)                                                



[facility]2017-01-24 16:51:00





             Test Item    Value        Reference Range Interpretation Comments

 

             BUN (test code = BUN) 16           -                      



[facility]2017-01-24 16:51:00





             Test Item    Value        Reference Range Interpretation Comments

 

             Chloride Lvl (test code = Chloride Lvl) 109                  

            



[facility]2017-01-24 16:51:00





             Test Item    Value        Reference Range Interpretation Comments

 

             Potassium Lvl (test code = Potassium 4.3          3.5-5.1          

         



             Lvl)                                                



[facility]2017-01-24 16:51:00





             Test Item    Value        Reference Range Interpretation Comments

 

             Sodium Lvl (test code = Sodium Lvl) 144          135-145           

        



[facility]2017-01-24 16:51:00





             Test Item    Value        Reference Range Interpretation Comments

 

             CO2 (test code = CO2)                      



[facility]2017-01-24 16:51:00





             Test Item    Value        Reference Range Interpretation Comments

 

             Calcium Lvl (test code = Calcium Lvl) 8.4          8.5-10.5        

          



[facility]2017-01-24 16:51:00





             Test Item    Value        Reference Range Interpretation Comments

 

             AGAP (test code = AGAP) 11.3         10.0-20.0                 



[facility]2017-01-24 16:51:00





             Test Item    Value        Reference Range Interpretation Comments

 

             Glucose Lvl (test code = Glucose Lvl) 109          70-99           

          



[facility]2017-01-24 16:51:00





             Test Item    Value        Reference Range Interpretation Comments

 

             eGFR (test code = eGFR) 52                                     



[facility]2017-01-24 16:51:00





             Test Item    Value        Reference Range Interpretation Comments

 

             Creatinine Lvl (test code = Creatinine 1.15         0.50-1.40      

           



             Lvl)                                                



[facility]2017-01-24 16:51:00





             Test Item    Value        Reference Range Interpretation Comments

 

             BUN (test code = BUN) 16           -                      



[facility]2017-01-24 16:51:00





             Test Item    Value        Reference Range Interpretation Comments

 

             Chloride Lvl (test code = Chloride Lvl) 109                  

            



[facility]2017-01-24 16:51:00





             Test Item    Value        Reference Range Interpretation Comments

 

             Potassium Lvl (test code = Potassium 4.3          3.5-5.1          

         



             Lvl)                                                



[facility]2017-01-24 16:51:00





             Test Item    Value        Reference Range Interpretation Comments

 

             Sodium Lvl (test code = Sodium Lvl) 144          135-145           

        



[facility]2017-01-24 16:51:00





             Test Item    Value        Reference Range Interpretation Comments

 

             CO2 (test code = CO2) 28           24-32                     



[facility]2017-01-24 16:51:00





             Test Item    Value        Reference Range Interpretation Comments

 

             Calcium Lvl (test code = Calcium Lvl) 8.4          8.5-10.5        

          



[facility]2017-01-24 16:51:00





             Test Item    Value        Reference Range Interpretation Comments

 

             AGAP (test code = AGAP) 11.3         10.0-20.0                 



[facility]2017-01-24 16:51:00





             Test Item    Value        Reference Range Interpretation Comments

 

             Glucose Lvl (test code = Glucose Lvl) 109          70-99           

          



[facility]2017-01-24 16:51:00





             Test Item    Value        Reference Range Interpretation Comments

 

             eGFR (test code = eGFR) 52                                     



[facility]2017-01-24 16:51:00





             Test Item    Value        Reference Range Interpretation Comments

 

             Creatinine Lvl (test code = Creatinine 1.15         0.50-1.40      

           



             Lvl)                                                



[facility]2017-01-24 16:51:00





             Test Item    Value        Reference Range Interpretation Comments

 

             BUN (test code = BUN) 16                                 



[facility]2017-01-24 16:51:00





             Test Item    Value        Reference Range Interpretation Comments

 

             Chloride Lvl (test code = Chloride Lvl) 109                  

            



[facility]2017-01-24 16:51:00





             Test Item    Value        Reference Range Interpretation Comments

 

             Potassium Lvl (test code = Potassium 4.3          3.5-5.1          

         



             Lvl)                                                



[facility]2017-01-24 16:51:00





             Test Item    Value        Reference Range Interpretation Comments

 

             Sodium Lvl (test code = Sodium Lvl) 144          135-145           

        



[facility]2017-01-24 16:51:00





             Test Item    Value        Reference Range Interpretation Comments

 

             CO2 (test code = CO2) 28           24-32                     



[facility]2017-01-24 16:51:00





             Test Item    Value        Reference Range Interpretation Comments

 

             Calcium Lvl (test code = Calcium Lvl) 8.4          8.5-10.5        

          



[facility]2017-01-24 16:51:00





             Test Item    Value        Reference Range Interpretation Comments

 

             AGAP (test code = AGAP) 11.3         10.0-20.0                 



[facility]2017-01-24 16:51:00





             Test Item    Value        Reference Range Interpretation Comments

 

             Glucose Lvl (test code = Glucose Lvl) 109          70-99           

          



[facility]2017-01-24 16:51:00





             Test Item    Value        Reference Range Interpretation Comments

 

             eGFR (test code = eGFR) 52                                     



[facility]2017-01-24 16:51:00





             Test Item    Value        Reference Range Interpretation Comments

 

             Creatinine Lvl (test code = Creatinine 1.15         0.50-1.40      

           



             Lvl)                                                



[facility]2017-01-24 16:51:00





             Test Item    Value        Reference Range Interpretation Comments

 

             BUN (test code = BUN) 16           -                      



[facility]2017-01-24 16:51:00





             Test Item    Value        Reference Range Interpretation Comments

 

             Chloride Lvl (test code = Chloride Lvl) 109                  

            



[facility]2017-01-24 16:51:00





             Test Item    Value        Reference Range Interpretation Comments

 

             Potassium Lvl (test code = Potassium 4.3          3.5-5.1          

         



             Lvl)                                                



[facility]2017-01-24 16:51:00





             Test Item    Value        Reference Range Interpretation Comments

 

             Sodium Lvl (test code = Sodium Lvl) 144          135-145           

        



[facility]2017-01-24 16:51:00





             Test Item    Value        Reference Range Interpretation Comments

 

             CO2 (test code = CO2) 28           -32                     



[facility]2017-01-24 16:51:00





             Test Item    Value        Reference Range Interpretation Comments

 

             Calcium Lvl (test code = Calcium Lvl) 8.4          8.5-10.5        

          



[facility]2017-01-24 16:51:00





             Test Item    Value        Reference Range Interpretation Comments

 

             AGAP (test code = AGAP) 11.3         10.0-20.0                 



[facility]2017-01-24 16:51:00





             Test Item    Value        Reference Range Interpretation Comments

 

             Glucose Lvl (test code = Glucose Lvl) 109          70-99           

          



[facility]2017-01-24 16:51:00





             Test Item    Value        Reference Range Interpretation Comments

 

             eGFR (test code = eGFR) 52                                     



[facility]2017-01-24 16:51:00





             Test Item    Value        Reference Range Interpretation Comments

 

             Creatinine Lvl (test code = Creatinine 1.15         0.50-1.40      

           



             Lvl)                                                



[facility]2017-01-24 16:51:00





             Test Item    Value        Reference Range Interpretation Comments

 

             BUN (test code = BUN) 16           -                      



[facility]2017-01-24 16:51:00





             Test Item    Value        Reference Range Interpretation Comments

 

             Chloride Lvl (test code = Chloride Lvl) 109                  

            



[facility]2017-01-24 16:51:00





             Test Item    Value        Reference Range Interpretation Comments

 

             Potassium Lvl (test code = Potassium 4.3          3.5-5.1          

         



             Lvl)                                                



[facility]2017-01-24 16:51:00





             Test Item    Value        Reference Range Interpretation Comments

 

             Sodium Lvl (test code = Sodium Lvl) 144          135-145           

        



[facility]2017-01-24 16:51:00





             Test Item    Value        Reference Range Interpretation Comments

 

             CO2 (test code = CO2) 28           -                     



[facility]2017-01-24 16:51:00





             Test Item    Value        Reference Range Interpretation Comments

 

             Calcium Lvl (test code = Calcium Lvl) 8.4          8.5-10.5        

          



[facility]2017-01-24 16:51:00





             Test Item    Value        Reference Range Interpretation Comments

 

             AGAP (test code = AGAP) 11.3         10.0-20.0                 



[facility]2017-01-24 16:51:00





             Test Item    Value        Reference Range Interpretation Comments

 

             Glucose Lvl (test code = Glucose Lvl) 109          70-99           

          



[facility]2017-01-24 16:51:00





             Test Item    Value        Reference Range Interpretation Comments

 

             eGFR (test code = eGFR) 52                                     



[facility]2017-01-24 16:51:00





             Test Item    Value        Reference Range Interpretation Comments

 

             Creatinine Lvl (test code = Creatinine 1.15         0.50-1.40      

           



             Lvl)                                                



[facility]2017-01-24 16:51:00





             Test Item    Value        Reference Range Interpretation Comments

 

             BUN (test code = BUN) 16           -                      



[facility]2017-01-24 16:51:00





             Test Item    Value        Reference Range Interpretation Comments

 

             Chloride Lvl (test code = Chloride Lvl) 109                  

            



[facility]2017-01-24 16:51:00





             Test Item    Value        Reference Range Interpretation Comments

 

             Potassium Lvl (test code = Potassium 4.3          3.5-5.1          

         



             Lvl)                                                



[facility]2017-01-24 16:51:00





             Test Item    Value        Reference Range Interpretation Comments

 

             Sodium Lvl (test code = Sodium Lvl) 144          135-145           

        



[facility]2017-01-24 16:51:00





             Test Item    Value        Reference Range Interpretation Comments

 

             CO2 (test code = CO2) 28                                



[facility]2017-01-24 16:51:00





             Test Item    Value        Reference Range Interpretation Comments

 

             Calcium Lvl (test code = Calcium Lvl) 8.4          8.5-10.5        

          



[facility]2017-01-24 16:51:00





             Test Item    Value        Reference Range Interpretation Comments

 

             AGAP (test code = AGAP) 11.3         10.0-20.0                 



[facility]2017-01-24 16:51:00





             Test Item    Value        Reference Range Interpretation Comments

 

             Glucose Lvl (test code = Glucose Lvl) 109          70-99           

          



[facility]2017-01-24 16:51:00





             Test Item    Value        Reference Range Interpretation Comments

 

             eGFR (test code = eGFR) 52                                     



[facility]2017-01-24 16:51:00





             Test Item    Value        Reference Range Interpretation Comments

 

             Creatinine Lvl (test code = Creatinine 1.15         0.50-1.40      

           



             Lvl)                                                



[facility]2017-01-24 16:51:00





             Test Item    Value        Reference Range Interpretation Comments

 

             BUN (test code = BUN) 16           -                      



[facility]2017-01-24 16:51:00





             Test Item    Value        Reference Range Interpretation Comments

 

             Chloride Lvl (test code = Chloride Lvl) 109                  

            



[facility]2017-01-24 16:51:00





             Test Item    Value        Reference Range Interpretation Comments

 

             Potassium Lvl (test code = Potassium 4.3          3.5-5.1          

         



             Lvl)                                                



[facility]2017-01-24 16:51:00





             Test Item    Value        Reference Range Interpretation Comments

 

             Sodium Lvl (test code = Sodium Lvl) 144          135-145           

        



[facility]2017-01-24 16:51:00





             Test Item    Value        Reference Range Interpretation Comments

 

             CO2 (test code = CO2) 28           24-32                     



[facility]2017-01-24 16:51:00





             Test Item    Value        Reference Range Interpretation Comments

 

             Calcium Lvl (test code = Calcium Lvl) 8.4          8.5-10.5        

          



[facility]2017-01-24 16:51:00





             Test Item    Value        Reference Range Interpretation Comments

 

             AGAP (test code = AGAP) 11.3         10.0-20.0                 



[facility]2017-01-24 16:51:00





             Test Item    Value        Reference Range Interpretation Comments

 

             Glucose Lvl (test code = Glucose Lvl) 109          70-99           

          



[facility]2017-01-24 16:51:00





             Test Item    Value        Reference Range Interpretation Comments

 

             eGFR (test code = eGFR) 52                                     



[facility]

## 2023-01-14 NOTE — EDPHYS
Physician Documentation                                                                           

 [facility]                                                                 

Name: Juli Rushing                                                                             

Age: 67 yrs                                                                                       

Sex: Female                                                                                       

: 1955                                                                                   

MRN: [id number]                                                                                   

Arrival Date: 2023                                                                          

Time: 08:43                                                                                       

Account#: [id number]                                                                            

Bed 16                                                                                            

Private MD:                                                                                       

[facility] Physician Arnold Larsen                                                                      

HPI:                                                                                              

                                                                                             

10:15 This 67 yrs old  Female presents to ER via EMS with complaints of ams, nausea  cee 

      and vomiting, hallucinating.                                                                

10:15 The patient presents to the emergency department with nausea, vomiting, that is         cee 

      intermittent. Onset: The symptoms/episode began/occurred 1 year(s) ago. Possible            

      causes: unknown. The symptoms are aggravated by nothing. The symptoms are alleviated by     

      nothing. The patient presents with confusion. Possible causes: CVA or TIA, drug use,        

      head injury, low blood sugar, seizure, sepsis. Associated signs and symptoms: Pertinent     

      positives: nausea, vomiting, weakness. Patient's baseline: Neuro: alert and fully           

      oriented. Severity of symptoms: At their worst the symptoms were mild in the emergency      

      department the symptoms have improved mildly. The patient has experienced similar           

      episodes in the past, multiple times.                                                       

                                                                                                  

Historical:                                                                                       

- Allergies:                                                                                      

08:44 Bactrim;                                                                                ll1 

08:44 cefepime;                                                                               ll1 

08:44 Codeine;                                                                                ll1 

08:44 Levofloxacin;                                                                           ll1 

08:44 Morphine; itching;                                                                      ll1 

08:44 PENICILLINS;                                                                            ll1 

08:44 QUINOLONES;                                                                             ll1 

- PMHx:                                                                                           

08:44 Hypertensive disorder; High Cholesterol; stage 4 kidney failure; Dialysis; Congestive   ll1 

      heart failure; Atrial fibrillation;                                                         

- PSHx:                                                                                           

08:44 back surgery; Dialysis catheter LEFT upper chest;                                       ll1 

                                                                                                  

- Immunization history:: Adult Immunizations up to date.                                          

- Social history:: Smoking status: Patient denies any tobacco usage or history of.                

- Family history:: not pertinent.                                                                 

                                                                                                  

                                                                                                  

ROS:                                                                                              

10:18 Constitutional: Negative for fever, chills, and weight loss, Eyes: Negative for injury, cee 

      pain, redness, and discharge, ENT: Negative for injury, pain, and discharge, Neck:          

      Negative for injury, pain, and swelling, Cardiovascular: Negative for chest pain,           

      palpitations, and edema, Respiratory: Negative for shortness of breath, cough,              

      wheezing, and pleuritic chest pain, Back: Negative for injury and pain, : Negative        

      for injury, bleeding, discharge, and swelling, MS/Extremity: Negative for injury and        

      deformity, Skin: Negative for injury, rash, and discoloration, Allergy/Immunology:          

      Negative for hives, rash, and allergies, Endocrine: Negative for neck swelling,             

      polydipsia, polyuria, polyphagia, and marked weight changes, Hematologic/Lymphatic:         

      Negative for swollen nodes, abnormal bleeding, and unusual bruising.                        

10:18 Abdomen/GI: Positive for nausea and vomiting.                                               

10:18 Neuro: Positive for altered mental status, weakness.                                        

10:18 Psych: Positive for visual hallucinations.                                                  

                                                                                                  

Exam:                                                                                             

10:22 Constitutional:  This is a well developed, well nourished patient who is awake, alert,  cee 

      and in no acute distress. Head/Face:  Normocephalic, atraumatic. Eyes:  Pupils equal        

      round and reactive to light, extra-ocular motions intact.  Lids and lashes normal.          

      Conjunctiva and sclera are non-icteric and not injected.  Cornea within normal limits.      

      Periorbital areas with no swelling, redness, or edema. ENT:  Nares patent. No nasal         

      discharge, no septal abnormalities noted.  Tympanic membranes are normal and external       

      auditory canals are clear.  Oropharynx with no redness, swelling, or masses, exudates,      

      or evidence of obstruction, uvula midline.  Mucous membranes moist. Neck:  Trachea          

      midline, no thyromegaly or masses palpated, and no cervical lymphadenopathy.  Supple,       

      full range of motion without nuchal rigidity, or vertebral point tenderness.  No            

      Meningismus. Chest/axilla:  Normal chest wall appearance and motion.  Nontender with no     

      deformity.  No lesions are appreciated. Cardiovascular:  Regular rate and rhythm with a     

      normal S1 and S2.  No gallops, murmurs, or rubs.  Normal PMI, no JVD.  No pulse             

      deficits. Respiratory:  Lungs have equal breath sounds bilaterally, clear to                

      auscultation and percussion.  No rales, rhonchi or wheezes noted.  No increased work of     

      breathing, no retractions or nasal flaring. Abdomen/GI:  Soft, non-tender, with normal      

      bowel sounds.  No distension or tympany.  No guarding or rebound.  No evidence of           

      tenderness throughout. Back:  No spinal tenderness.  No costovertebral tenderness.          

      Full range of motion. Female :  Normal external genitalia. Skin:  Warm, dry with          

      normal turgor.  Normal color with no rashes, no lesions, and no evidence of cellulitis.     

      MS/ Extremity:  Pulses equal, no cyanosis.  Neurovascular intact.  Full, normal range       

      of motion. Neuro:  Awake and alert, GCS 15, oriented to person, place, time, and            

      situation.  Cranial nerves II-XII grossly intact.  Motor strength 5/5 in all                

      extremities.  Sensory grossly intact.  Cerebellar exam normal.  Normal gait. Psych:         

      Awake, alert, with orientation to person, place and time.  Behavior, mood, and affect       

      are within normal limits.                                                                   

10:22 ECG was reviewed by the Attending Physician.                                                

                                                                                                  

Vital Signs:                                                                                      

08:46  / 86; Pulse 89; Resp 18; Temp 97.6(T); Pulse Ox 100% on R/A; Weight 104.78 kg;   ko1 

      Height 5 ft. 7 in. (170.18 cm) (R); Pain 0/10;                                              

09:35  / 75; Pulse 82; Resp 16; Pulse Ox 100% on 4 lpm NC;                              ko1 

10:17  / 65; Pulse 88; Pulse Ox 98% on 4 lpm NC;                                        ko1 

11:00  / 99; Pulse 93; Pulse Ox 97% ;                                                   ko1 

08:46 Body Mass Index 36.18 (104.78 kg, 170.18 cm)                                            ko1 

                                                                                                  

MDM:                                                                                              

08:44 Patient medically screened.                                                             [facility] 

10:19 Differential diagnosis: Nonspecific abd pain, gastritis, pancreatitis, viral            cee 

      gastroenteritis, gastroenteritis. Differential Diagnosis: CVA, electrolyte abnormality,     

      hypoglycemia, intracranial bleed, pneumonia, seizure, TIA, volume depletion. Data           

      reviewed: vital signs, nurses notes, lab test result(s), EKG, radiologic studies, CT        

      scan, plain films. Consideration of Admission/Observation Patient was admitted/placed       

      on observation. Escalation of care including admission/observation considered. Test         

      considered but Not performed: MRI: mri not available. Care significantly affected by        

      the following chronic conditions: Diabetes, Hypertension.                                   

10:26 Management of patient was discussed with the following: Hospitalist: to admit, consult  cee tamayo/desiree. Historians other than the Patient: Spouse/Significant Other: .     

                                                                                                  

                                                                                             

08:48 Order name: Basic Metabolic Panel; Complete Time: 10:29                                 [facility] 

                                                                                             

08:48 Order name: CBC with Diff; Complete Time: 10:                                         [facility] 

                                                                                             

08:48 Order name: LFT's; Complete Time: 10:29                                                 [facility] 

                                                                                             

08:48 Order name: Magnesium; Complete Time: 10:29                                             cee 

                                                                                             

08:48 Order name: NT PRO-BNP; Complete Time: 10:29                                            cee 

                                                                                             

08:48 Order name: PT-INR; Complete Time: 10:08                                                                                                                                             

08:48 Order name: Troponin HS; Complete Time: 10:29                                                                                                                                        

08:48 Order name: Urine Microscopic Only                                                                                                                                                   

08:48 Order name: Lipase; Complete Time: 10:29                                                [facility] 

                                                                                             

08:48 Order name: SARS RAPID; Complete Time: 10:08                                            [facility] 

                                                                                             

10:28 Order name: Manual Differential; Complete Time: 10:29                                   EDMS

                                                                                             

10:34 Order name: Blood Culture Adult (2)                                                                                                                                                  

15:05 Order name: Liver (Hepatic) Function                                                    EDMS

                                                                                             

08:48 Order name: XRAY Chest (1 view); Complete Time: 10:08                                   [facility] 

                                                                                             

08:48 Order name: EKG; Complete Time: 08:49                                                   [facility] 

                                                                                             

08:48 Order name: Cardiac monitoring; Complete Time: 08:59                                    [facility] 

                                                                                             

08:48 Order name: EKG - Nurse/Tech; Complete Time: 09:21                                      [facility] 

                                                                                             

08:48 Order name: IV Saline Lock; Complete Time: 09:06                                        [facility] 

                                                                                             

08:48 Order name: Labs collected and sent; Complete Time: 09:06                                                                                                                            

08:48 Order name: O2 Per Protocol; Complete Time: 08:59                                       cee 

                                                                                             

08:48 Order name: O2 Sat Monitoring; Complete Time: 09:21                                     [facility] 

                                                                                             

08:48 Order name: CT Head Brain wo Cont                                                                                                                                                    

08:48 Order name: Urine Dipstick-Ancillary (obtain specimen); Complete Time: 14:18            [facility] 

                                                                                             

08:52 Order name: Head Brain Wo Cont; Complete Time: 10:08                                    [facility]

                                                                                             

10:34 Order name: CT Abd/Pelvis - Without Contrast                                                                                                                                         

11:27 Order name: CT                                                                          EDMS

                                                                                             

15:05 Order name: T4 Free                                                                     EDMS

                                                                                             

15:05 Order name: Thyroid Stimulating Hormone                                                 EDMS

                                                                                                  

ECG:                                                                                              

10:22 Rate is 79 beats/min. Rhythm is irregularly irregular. QRS Axis is Normal. MO interval  cee 

      is normal. QRS interval is normal. QT interval is normal. No Q waves. T waves are           

      Normal. No ST changes noted. Clinical impression: Abnormal EKG without significant          

      change, Atrial Fibrillation, and No evidence of ischemia. Interpreted by me. Reviewed       

      by me.                                                                                      

                                                                                                  

Administered Medications:                                                                         

14:18 Not Given (Patient Refused): Zofran (Ondansetron) 4 mg IVP once; over 2 minutes         ll1 

                                                                                                  

                                                                                                  

Disposition Summary:                                                                              

23 10:38                                                                                    

Hospitalization Ordered                                                                           

      Hospitalization Status: Observation                                                     cee 

      Provider: Eliecer Hernandez cha 

      Location: Telemetry/MedSurg (observation)                                               cee 

      Condition: Fair                                                                         cee 

      Problem: new                                                                            cee 

      Symptoms: have improved                                                                 cee 

      Bed/Room Type: Standard                                                                 cee 

      Room Assignment: 403(23 14:52)                                                    eb  

      Diagnosis                                                                                   

        - End stage renal disease - on HD                                                     cee 

        - Visual hallucinations                                                               cee 

        - Bandemia                                                                            cee 

        - Anemia in chronic kidney disease                                                    cee 

        - UTI/ Urinary tract infection, site not specified                                    cee 

      Forms:                                                                                      

        - Medication Reconciliation Form                                                      cee 

        - SBAR form                                                                           cee 

Signatures:                                                                                       

Dispatcher [employer]                           EDMS                                                 

Arnold Larsen MD MD cha Botello, Elizabeth eb Lewis, Lynsay, RN                       RN   ll1                                                  

Rosalind Avery, ELPIDIO                       RN   ko1                                                  

                                                                                                  

Corrections: (The following items were deleted from the chart)                                    

10:28 09:38 CBC Smear Scan ordered. EDMS                                                      EDMS

14:52 10:38 cee black  

                                                                                                  

**************************************************************************************************

## 2023-01-14 NOTE — RAD REPORT
EXAM DESCRIPTION:  CT - Head Brain Wo Cont - 1/14/2023 9:15 am

 

CLINICAL HISTORY:  Mental status change, unknown cause

 

COMPARISON:  None

 

TECHNIQUE:  All CT scans are performed using dose optimization technique as appropriate and may inclu
de automated exposure control or mA/KV adjustment according to patient size.

 

FINDINGS:  No intracranial hemorrhage, hydrocephalus or extra-axial fluid collection.No areas of brai
n edema or evidence of midline shift.

 

Right maxillary sinus mucous retention cyst. The calvarium is intact.

 

IMPRESSION:  No acute intracranial abnormality.

## 2023-01-14 NOTE — P.HP
Patient History


Date of Service: 01/14/23


Primary Care Provider: Brittni


Reason for admission: AMS, N/V


History of Present Illness: 





This is a 67-year-old  female with prior medical history of 

hypertension, hyperlipidemia, end-stage renal disease on dialysis, CHF, and 

atrial fibrillation.  Patient presents to the emergency room via EMS with 

complaints of altered mental status, nausea and vomiting, and hallucination.  

Patient was evaluated in the ER, with  at the bedside.  Per patient, she 

started having episodes of nausea and vomiting during her dialysis treatment 

today at [facility].  She reports receiving Phenergan, and that is when her ha

llucinations started.  Patient reports having episodes of nausea every day for 

over a year now.  Per , patient has been having bouts of hallucination 

for a few days, with dry heaving.  Patient states that she is compliant with her

dialysis treatment.  She says she was an hour into her treatment, when her 

symptoms started.  Patient is alert and oriented x3, she follows commands, 

PERRLA noted, and sensations are intact.  Patient will be admitted under the 

care of Dr. Hernandez.  Nephrology will be consulted for further recommendations.


Allergies





cefepime Allergy (Verified 01/05/22 06:30)


   Hives


codeine Allergy (Verified 07/30/22 05:03)


   Itching


levofloxacin Allergy (Verified 01/05/22 06:30)


   Hives/Rash


morphine Allergy (Verified 07/30/22 05:03)


   Itching


Penicillins Allergy (Verified 01/05/22 06:30)


   Hives/Rash


sulfamethoxazole [From Bactrim] Allergy (Verified 07/30/22 05:03)


   Hives


trimethoprim [From Bactrim] Allergy (Verified 07/30/22 05:03)


   Hives





Home Medications: 








Apixaban [Eliquis] 2.5 mg PO BID 07/29/22 


Bumetanide 1 tab PO DAILY 07/29/22 


Midodrine HCl 1 tab PO TID 07/29/22 


Pantoprazole [Protonix Tab*] 1 tab PO ONCE 07/29/22 


Sevelamer Carbonate [Renvela] 800 mg PO TID 07/29/22 


Amiodarone HCl [Cordarone*] 200 mg PO BID 90 Days #180 tab 12/30/22 


Alendronate Sodium 70 mg PO 1X 01/04/23 


Gabapentin 300 mg PO DAILY 01/04/23 


Linaclotide [Linzess] 72 mcg PO DAILY AFTER SUPPER 90 Days #90 tab 01/05/23 


Promethazine Tab [Phenergan] 25 mg PO Q6HP PRN 30 Days #90 tab 01/05/23 


Hydrocortisone/Pramoxine [Analpram Hc 2.5% Cream] 30 gm RC BID 5 Days #30 gm 

01/11/23 








- Past Medical/Surgical History


Diabetic: No


-: Chronic hypotension


-: Hyperlipidemia


-: Chronic anticoagulation use


-: History of nephrolithiasis requiring stent placement


-: Recurrent UTI


-: End-stage renal disease


-: CHF


-: HTN


-: A-Fib


-: tubal ligation


-: dialysis port


-: cholecystectomy


-: right broken wrist


-: lasik


-: cataracts removed


-: back surgery





- Family History


  ** Sister


-: Cancer


Notes: per pt, sister has lung cancer and is being treated for a "spot on her 

brain"





- Social History


Smoking Status: Never smoker


Alcohol use: No


CD- Drugs: No


Caffeine use: Yes





Review of Systems


10-point ROS is otherwise unremarkable


Respiratory: SOB with Excertion


Gastrointestinal: Nausea, Vomiting, Abdominal Pain


Genitourinary: Other (on HD)


Musculoskeletal: Pedal edema


Neurological: Confusion





Physical Examination





- Vital Signs


Temperature: 97.6 F


Blood Pressure: 125/92


Pulse: 77


Respirations: 17


Pulse Ox (%): 96





- Physical Exam


General: Alert, In no apparent distress, Oriented x3


HEENT: Atraumatic, Normocephalic, PERRLA


Neck: Supple


Respiratory: Crackles/rales


Cardiovascular: Edema


Capillary refill: <2 Seconds


Gastrointestinal: Normal bowel sounds


Musculoskeletal: Swelling


Integumentary: Rash(es)


Neurological: Normal speech, Normal tone, Sensation intact


Lymphatics: No axilla or inguinal lymphadenopathy





- Studies


Laboratory Data (last 24 hrs)





01/14/23 09:00: PT 17.1 H, INR 1.55


01/14/23 09:00: WBC 9.10, Hgb 8.6 L, Hct 28.5 L, Plt Count 120 L


01/14/23 09:00: Sodium 138, Potassium 3.9, BUN 42 H, Creatinine 5.87 H*, Glucose

133 H, Magnesium 2.1, Total Bilirubin 0.8, AST 13 L, ALT 14, Alkaline 

Phosphatase 160 H, Lipase 277








Assessment and Plan





- Plan


Assessment


AMS with hallucination


End-stage renal disease on dialysis


Atrial fibrillation


Hyperlipidemia


Acute on chronic diastolic CHF


Abdominal pain with nausea vomiting





Plan


AMS with hallucination


   Back to baseline


   Urine culture pending


   Possibly related to Phenergan use


   CT head/brain- No acute intracranial abnormality


End-stage renal disease on dialysis


   Creatinine of 5.87, BUN 42


   Treatment on Tuesday Thursday Saturday


   Left chest HD catheter present


   Tolerated 1 hour today


   Nephrology consulted, recommendations appreciated


Atrial fibrillation


   Rate control


   Continue home medications


Hyperlipidemia


   Continue statin


Acute on chronic diastolic CHF


   BNP 47, 234


   Continue Bumex


   Strict I/Os


   Daily weight


   ECHO 8/27/22


   NORMAL LEFT VENTRICULAR EJECTION FRACTION 55-60%. NORMAL RIGHT SIZE AND 

FUNCTION. MODERATE MITRAL REGURGITATION. MODERATE                               

      TRICUSPID REGURGITATION.


   CXR- Linear opacities at the right lung base and elevated right hemidiaphragm

likely reflecting atelectasis. No new acute process





Abdominal pain with nausea vomiting


   Stable


   CT abdomen/pelvis


   1.   Similar left-sided hydronephrosis with multiple renal calculi at the 

left UPJ


   2.   Possible new left renal lesion measuring 2.4 cm. It is better seen on 

today's CT compared with 01/04/2023 but is new since 10/16/2022.                

              Consider ultrasound further evaluation


   Continue Zofran


   Do not give Phenergan


PPX- Eliquis


Code Status: Full code


Discharge Plan: Home


Plan to discharge in: 48 Hours





- Advance Directives


Does patient have a Living Will: No


Does patient have a Durable POA for Healthcare: No





- Code Status/Comfort Care


Code Status Assessed: Yes (Full code)


Critical Care: No


Time Spent Managing Pts Care (In Minutes): 50

## 2023-01-14 NOTE — RAD REPORT
EXAM DESCRIPTION:  CTAbdomen   Pelvis Wo Contrast - 1/14/2023 10:53 am

 

CLINICAL HISTORY:

Abdominal pain, acute, nonlocalized

 

COMPARISON:  Abdomen   Pelvis Wo Contrast dated 10/16/2022; Stone Protocol dated 10/13/2022; Abdomen 
  Pelvis Wo Contrast dated 10/8/2022; Abdomen   Pelvis Wo Contrast dated 7/29/2022; Chest Abd Pelvis 
Wo Con dated 1/4/2023

 

TECHNIQUE:  CT of the abdomen and pelvis was performed without contrast.

 

All CT scans are performed using dose optimization technique as appropriate and may include automated
 exposure control or mA/KV adjustment according to patient size.

 

FINDINGS:  Lower chest: Small right pleural effusion with subjacent scarring and/or underlying atelec
tasis. Cardiomegaly. Multi-vessel coronary disease dialysis catheter.

Liver: Hepatomegaly with heterogeneous attenuation. Pneumobilia

Biliary: Cholecystectomy

Stomach: No significant focal abnormality.

Duodenum: No significant focal abnormality.

Pancreas: No significant abnormality.

Spleen: No significant abnormality.

Adrenal: No suspicious lesions.

Kidney/ureter: Left-sided hydronephrosis. Renal cortical thinning. Possible new left renal mass at th
e left interpolar aspect of the kidney. Right renal lesions which are likely cysts. Renal scarring. C
ystic structure inferior to the right hepatic lobe and contiguous with the right kidney in the right 
upper quadrant is unchanged. Stone for stones in the left proximal ureter at the UPJ measuring 2.3 cm
.

Retroperitoneum: Prominent retroperitoneal lymph nodes.

Vascular: No aneurysm.

Bowel: No significant focal abnormality.

Peritoneum: Trace free fluid. Body wall edema which is increased . .

Bladder: Grossly unremarkable.

Reproductive: No adnexal masses. Tubal ligation clips.

Bones: Vertebroplasty/kyphoplasty changes at L1. Remote L2 through L5 compression fractures . Remote 
T11 and T2 compression fracture.

Other: n/a

 

IMPRESSION:  1. Similar left-sided hydronephrosis with multiple renal calculi at the left UPJ.

2. Possible new left renal lesion measuring 2.4 cm. It is better seen on today's CT compared with 01/
04/2023 but is new since 10/16/2022. Consider ultrasound further evaluation.

## 2023-01-14 NOTE — RAD REPORT
EXAM DESCRIPTION:  RAD - Chest Single View - 1/14/2023 9:24 am

 

CLINICAL HISTORY:  COUGH

 

COMPARISON:  Chest Single View dated 1/10/2023; Chest Single View dated 1/4/2023; Chest Single View d
ated 12/30/2022; Chest Single View dated 12/24/2022; Chest Abd Pelvis Wo Con dated 1/4/2023

 

FINDINGS:  Lines: Left IJ approach dialysis catheter.

Lungs: Linear opacity at the right lung base likely representing atelectasis . Low lung volumes. Juli
lar elevated right hemidiaphragm.

Pleural: No significant pleural effusions or pneumothorax.

Cardiac: Similar size configuration.

Mediastinum: Within normal limits.

Bones: No acute fractures.

Other: Loop recorder overlies heart.

 

IMPRESSION:  Linear opacities at the right lung base and elevated right hemidiaphragm likely reflecti
ng atelectasis. No new acute process. .

## 2023-01-14 NOTE — ER
Nurse's Notes                                                                                     

 [facility]                                                                 

Name: Juli Rushing                                                                             

Age: 67 yrs                                                                                       

Sex: Female                                                                                       

: 1955                                                                                   

MRN: [id number]                                                                                   

Arrival Date: 2023                                                                          

Time: 08:43                                                                                       

Account#: [id number]                                                                            

Bed 16                                                                                            

Private MD:                                                                                       

Diagnosis: End stage renal disease-on HD;Visual hallucinations;Bandemia;Anemia in chronic kidney  

  disease;UTI/ Urinary tract infection, site not specified                                        

                                                                                                  

Presentation:                                                                                     

                                                                                             

08:46 Chief complaint: EMS states: patient was at dialysis when she started hallucinating.    ko1 

      She was eating food that was not there and seeing her sister who was not there. Patient     

      said she started a new heart medication about a week ago and that is the only thing         

      different. She is not sure of which medication it is. Coronavirus screen: At this time,     

      the client does not indicate any symptoms associated with coronavirus-19. Ebola Screen:     

      No symptoms or risks identified at this time. Initial Sepsis Screen: Does the patient       

      meet any 2 criteria? No. Patient's initial sepsis screen is negative. Does the patient      

      have a suspected source of infection? No. Patient's initial sepsis screen is negative.      

      Risk Assessment: Do you want to hurt yourself or someone else? Patient reports no           

      desire to harm self or others. Onset of symptoms was 2023.                      

08:46 Method Of Arrival: EMS: [facility]                                                ko1 

08:46 Acuity: MIRTHA 3                                                                           ko1 

                                                                                                  

Triage Assessment:                                                                                

08:46 General: Appears in no apparent distress. comfortable, Behavior is calm, cooperative,   ko1 

      appropriate for age. Pain: Denies pain.                                                     

                                                                                                  

Historical:                                                                                       

- Allergies:                                                                                      

08:44 Bactrim;                                                                                ll1 

08:44 cefepime;                                                                               ll1 

08:44 Codeine;                                                                                ll1 

08:44 Levofloxacin;                                                                           ll1 

08:44 Morphine; itching;                                                                      ll1 

08:44 PENICILLINS;                                                                            ll1 

08:44 QUINOLONES;                                                                             ll1 

- PMHx:                                                                                           

08:44 Hypertensive disorder; High Cholesterol; stage 4 kidney failure; Dialysis; Congestive   ll1 

      heart failure; Atrial fibrillation;                                                         

- PSHx:                                                                                           

08:44 back surgery; Dialysis catheter LEFT upper chest;                                       ll1 

                                                                                                  

- Immunization history:: Adult Immunizations up to date.                                          

- Social history:: Smoking status: Patient denies any tobacco usage or history of.                

- Family history:: not pertinent.                                                                 

                                                                                                  

                                                                                                  

Screenin:35 [facility] ED Fall Risk Assessment (Adult) History of falling in the last 3 months,       ko1 

      including since admission No falls in past 3 months (0 pts) Confusion or Disorientation     

      Yes (5 pts) Intoxicated or Sedated No (0 pts) Impaired Gait Yes (1 pt) Mobility Assist      

      Device Used Yes (1 pt) Altered Elimination No (0 pt) Score/Fall Risk Level 3 or more        

      points = High Risk Oriented to surroundings, Maintained a safe environment, Educated pt     

      \T\ family on fall prevention, incl call for assistance when getting out of bed, Assessed   

      \T\ reinforced patient's understanding of fall precautions, Provided non-skid footwear,     

      Hourly rounding (assess needs \T\ fall precautionary measures) done, Used ambulatory aids   

      as needed (educated on \T\ assisted with), Used gait belt as appropriate Implemented a      

      Fall Risk Plan of Care, Apply high fall risk patient identification: yellow non skid        

      footwear/ fall signage, Remained w/in arm's length of patient and in sight while            

      toileting, Offered frequent toileting (1:1 observation), Remained with patient while        

      ambulating, Utilized family, sitter, or virtual  as indicated. Abuse screen:       

      Denies threats or abuse. Denies injuries from another. Nutritional screening: No            

      deficits noted. Tuberculosis screening: No symptoms or risk factors identified.             

                                                                                                  

Assessment:                                                                                       

08:30 General: Appears in no apparent distress. comfortable, Behavior is calm, cooperative,   ko1 

      appropriate for age. Pain: Denies pain. Neuro: No deficits noted. Cardiovascular: No        

      deficits noted. Respiratory: No deficits noted. GI: No deficits noted. : Reports she      

      is a dialysis patient and rarely makes urine anymore. EENT: No deficits noted. Derm: No     

      deficits noted. Musculoskeletal: No deficits noted.                                         

14:00 Reassessment: No changes from previously documented assessment. patient care assumed.   ll1 

      See [facility] for further info.                                                              

                                                                                                  

Vital Signs:                                                                                      

08:46  / 86; Pulse 89; Resp 18; Temp 97.6(T); Pulse Ox 100% on R/A; Weight 104.78 kg;   ko1 

      Height 5 ft. 7 in. (170.18 cm) (R); Pain 0/10;                                              

09:35  / 75; Pulse 82; Resp 16; Pulse Ox 100% on 4 lpm NC;                              ko1 

10:17  / 65; Pulse 88; Pulse Ox 98% on 4 lpm NC;                                        ko1 

11:00  / 99; Pulse 93; Pulse Ox 97% ;                                                   ko1 

08:46 Body Mass Index 36.18 (104.78 kg, 170.18 cm)                                            ko1 

                                                                                                  

ED Course:                                                                                        

08:43 Patient arrived in ED.                                                                  iw  

08:44 Arnold Larsen MD is Attending Physician.                                             cee 

08:44 Arm band placed on Patient placed in an exam room, on a stretcher.                      ll1 

08:45 Rosalind Avery, RN is Primary Nurse.                                                     ko1 

08:49 Triage completed.                                                                       ko1 

09:00 Oxygen administration via nasal cannula wears 4lpm NC at home.                          ko1 

09:06 Basic Metabolic Panel Sent.                                                             ko1 

09:06 CBC with Diff Sent.                                                                     ko1 

09:06 LFT's Sent.                                                                             ko1 

09:06 Magnesium Sent.                                                                         ko1 

09:06 NT PRO-BNP Sent.                                                                        ko1 

09:06 PT-INR Sent.                                                                            ko1 

09:06 Troponin HS Sent.                                                                       ko1 

09:07 Lipase Sent.                                                                            ko1 

09:07 SARS RAPID Sent.                                                                        ko1 

09:08 Inserted saline lock: 22 gauge in right antecubital area, using aseptic technique.      rs5 

      Blood collected.                                                                            

09:16 Head Brain Wo Cont In Process Unspecified.                                              EDMS

09:21 SARS RAPID Sent.                                                                        rs5 

09:21 Lipase Sent.                                                                            rs5 

09:21 Basic Metabolic Panel Sent.                                                             rs5 

09:21 CBC with Diff Sent.                                                                     rs5 

09:21 LFT's Sent.                                                                             rs5 

09:21 Magnesium Sent.                                                                         rs5 

09:21 NT PRO-BNP Sent.                                                                        rs5 

09:21 Troponin HS Sent.                                                                       rs5 

09:25 XRAY Chest (1 view) In Process Unspecified.                                             EDMS

09:35 Patient has correct armband on for positive identification. Fall risk band placed. Bed  ko1 

      in low position. Call light in reach. Side rails up X2. Client placed on continuous         

      cardiac and pulse oximetry monitoring. NIBP monitoring applied. Cardiac monitor on.         

09:35 No provider procedures requiring assistance completed.                                  ko1 

10:34 Eliecer Hernandez MD is Hospitalizing Provider.                                           cee 

11:26 Urine Microscopic Only Sent.                                                            ko1 

11:39 Urine collected: straight cath specimen, cloudy, Amount Returned: 5mL.                  ko1 

13:50 Blood Culture Adult (2) Sent.                                                           ko1 

14:19 Patient admitted, IV remains in place.                                                  ll1 

                                                                                                  

Administered Medications:                                                                         

14:18 Not Given (Patient Refused): Zofran (Ondansetron) 4 mg IVP once; over 2 minutes         ll1 

                                                                                                  

                                                                                                  

Medication:                                                                                       

14:19 VIS not applicable for this client.                                                     ll1 

                                                                                                  

Outcome:                                                                                          

10:38 Decision to Hospitalize by Provider.                                                    cee 

14:19 Admitted to ER Hold.  Please see [facility] for further documentation.                    ll1 

14:19 Condition: stable                                                                           

14:19 Instructed on the need for admit.                                                           

16:56 Patient left the ED.                                                                    ll1 

                                                                                                  

Signatures:                                                                                       

Dispatcher [employer]                           EDArnold Case MD MD cha Williams, Irene, RN RN iw Lewis, Lynsay, RN                       RN   ll1                                                  

Rosalind Avery RN                       RN   ko1                                                  

Ren De Jesus                               rs5                                                  

                                                                                                  

Corrections: (The following items were deleted from the chart)                                    

10:18 09:35  / 75; Pulse 82bpm; Resp 16bpm; Pulse Ox 100%; ko1                          ko1 

                                                                                                  

**************************************************************************************************

## 2023-01-15 VITALS — DIASTOLIC BLOOD PRESSURE: 77 MMHG | SYSTOLIC BLOOD PRESSURE: 133 MMHG | TEMPERATURE: 98.6 F

## 2023-01-15 LAB
BUN BLD-MCNC: 59 MG/DL (ref 7–18)
GLUCOSE SERPLBLD-MCNC: 107 MG/DL (ref 74–106)
HCT VFR BLD CALC: 28 % (ref 36–45)
HDLC SERPL-MCNC: 41 MG/DL (ref 40–60)
LDLC SERPL CALC-MCNC: 34 MG/DL (ref ?–130)
LYMPHOCYTES # SPEC AUTO: 0.9 K/UL (ref 0.7–4.9)
MAGNESIUM SERPL-MCNC: 2.3 MG/DL (ref 1.6–2.4)
MCV RBC: 99.3 FL (ref 80–100)
PMV BLD: 8 FL (ref 7.6–11.3)
POTASSIUM SERPL-SCNC: 4.6 MMOL/L (ref 3.5–5.1)
RBC # BLD: 2.82 M/UL (ref 3.86–4.86)

## 2023-01-15 RX ADMIN — AMIODARONE HYDROCHLORIDE SCH MG: 200 TABLET ORAL at 08:28

## 2023-01-15 RX ADMIN — Medication SCH ML: at 08:28

## 2023-01-15 RX ADMIN — APIXABAN SCH MG: 2.5 TABLET, FILM COATED ORAL at 08:28

## 2023-01-15 NOTE — P.CNS
Date of Consult: 01/15/23


Reason for Consult: ESRD, fluid managment 


Primary Care Provider: Brittni


Chief Complaint: AMS, N/V


History of Present Illness: 





66F w/ PMHx of ESRD on HD TTS, chronic diastolic heart failure, afib, anemia, & 

renal osteodystrophy, Pt using Oxygen at home 


pt was sent from HD unit for AMS 


Pt with HX of chronic nausea and vomiting, she was taking Phenergan, pt noticed 

to be altered at HD unit yesterday, HD quit after 1hrs and pt sent to ER , pt 

now is AAOX3 








ROS 


General :  weakness, 


HEENT: denied dizziness or bl;urry vision  


Resp:  SOB,  denied cough or wheezes 


Cardiovascular: denied chest pain, palpitation  


GI: denies abdominal pain, diarrhea or constipation 


: denies dysuria, urgency, foamy urine or blood tinged urine


Musculoskeletal: denies muscle aches, joint pain


Endo: denies polyuria and and polydipsia 


Extre: denies pain numbness and swelling 








Physical exam


General: AAOx3, NAD, obese


HEENT PERRLA, moist mucose membrane 


neck: supple, no elevated JVD


CHEST; diminished air entry B/l 


HEART : RRR. Normal S1,2 no murmur or rub 


Abd: soft, Nt


Ext: edema , chronic venous stasis


Skin : No rash








A/P 


#End-stage renal disease on outpt HD TTS. 


pt sc heduled for HD tomorrow and Tuesday 


renal diet 


renal dose medications 





#AMS 


likely due to Phenergan 


now AAOX3 





#Anemia of Chronic  kidney disease. 


resume epogen as an OP 





# afib.  


rate controlled now 





# Intradialytic hypotension. 


on  midodrine.








time spend exam the patient face to face , discussing with patient , reviewing 

la and radiology date, placing order , discussing the case with staff  and with 

other team  and hospitalist 45 min.


Allergies





cefepime Allergy (Verified 01/05/22 06:30)


   Hives


codeine Allergy (Verified 07/30/22 05:03)


   Itching


levofloxacin Allergy (Verified 01/05/22 06:30)


   Hives/Rash


morphine Allergy (Verified 07/30/22 05:03)


   Itching


Penicillins Allergy (Verified 01/05/22 06:30)


   Hives/Rash


sulfamethoxazole [From Bactrim] Allergy (Verified 07/30/22 05:03)


   Hives


trimethoprim [From Bactrim] Allergy (Verified 07/30/22 05:03)


   Hives





Home Medications: 








Apixaban [Eliquis] 2.5 mg PO BID 07/29/22 


Bumetanide 2 tab PO DAILY 07/29/22 


Midodrine HCl 5 mg PO TID 07/29/22 


Pantoprazole [Protonix Tab*] 1 tab PO DAILY 07/29/22 


Sevelamer Carbonate [Renvela] 800 mg PO TID 07/29/22 


Amiodarone HCl [Cordarone*] 200 mg PO BID 90 Days #180 tab 12/30/22 


Alendronate Sodium 70 mg PO MO 01/04/23 


Gabapentin 300 mg PO BEDTIME 01/04/23 


Promethazine Tab [Phenergan*] 25 mg PO Q6HP PRN 30 Days #90 tab 01/05/23 


Bimatoprost [Lumigan Opthalmic Drops*] 1 drop EACH EYE BEDTIME 01/15/23 


Brimonidine Tartrate [Alphagan P] 1 drop EACH EYE BID 01/15/23 


Metoprolol Tartrate [Lopressor*] 12.5 mg PO BID 01/15/23 


Minocycline HCl [Minocin] 1 tab PO BID 01/15/23 








- Past Medical/Surgical History


Diabetic: No


-: Chronic hypotension


-: Hyperlipidemia


-: Chronic anticoagulation use


-: History of nephrolithiasis requiring stent placement


-: Recurrent UTI


-: End-stage renal disease


-: CHF


-: HTN


-: A-Fib


-: tubal ligation


-: dialysis port


-: cholecystectomy


-: right broken wrist


-: lasik


-: cataracts removed


-: back surgery





- Family History


  ** Sister


Medical History: Cancer


Notes: per pt, sister has lung cancer and is being treated for a "spot on her 

brain"





- Social History


Alcohol use: No


CD- Drugs: No


Caffeine use: Yes





Physical Examination














Temp Pulse Resp BP Pulse Ox


 


 98.6 F   109 H  20   133/77   95 


 


 01/15/23 12:00  01/15/23 12:00  01/15/23 12:00  01/15/23 12:00  01/15/23 12:00

## 2023-01-16 NOTE — EKG
Test Date:    2023-01-14               Test Time:    09:29:07

Technician:   DEONNA BOWLING                                   

                                                     

MEASUREMENT RESULTS:                                       

Intervals:                                           

Rate:         79                                     

IN:                                                  

QRSD:         94                                     

QT:           390                                    

QTc:          447                                    

Axis:                                                

P:                                                   

IN:                                                  

QRS:          101                                    

T:            -48                                    

                                                     

INTERPRETIVE STATEMENTS:                                       

                                                     

Atrial fibrillation

Rightward axis

Low voltage QRS

Incomplete right bundle branch block

Nonspecific T wave abnormality

Abnormal ECG

Compared to ECG 01/10/2023 14:49:30

Low QRS voltage now present

T-wave abnormality now present

ST (T wave) deviation no longer present



Electronically Signed On 01-16-23 17:33:31 CST by Venancio Barajas

## 2023-01-20 ENCOUNTER — HOSPITAL ENCOUNTER (INPATIENT)
Dept: HOSPITAL 97 - ER | Age: 68
LOS: 10 days | DRG: 853 | End: 2023-01-30
Attending: INTERNAL MEDICINE | Admitting: INTERNAL MEDICINE
Payer: COMMERCIAL

## 2023-01-20 VITALS — BODY MASS INDEX: 37.7 KG/M2

## 2023-01-20 DIAGNOSIS — Z78.1: ICD-10-CM

## 2023-01-20 DIAGNOSIS — J96.01: ICD-10-CM

## 2023-01-20 DIAGNOSIS — D63.1: ICD-10-CM

## 2023-01-20 DIAGNOSIS — K21.9: ICD-10-CM

## 2023-01-20 DIAGNOSIS — N25.0: ICD-10-CM

## 2023-01-20 DIAGNOSIS — Z88.0: ICD-10-CM

## 2023-01-20 DIAGNOSIS — N18.6: ICD-10-CM

## 2023-01-20 DIAGNOSIS — Z91.15: ICD-10-CM

## 2023-01-20 DIAGNOSIS — E44.0: ICD-10-CM

## 2023-01-20 DIAGNOSIS — Z79.899: ICD-10-CM

## 2023-01-20 DIAGNOSIS — Z79.01: ICD-10-CM

## 2023-01-20 DIAGNOSIS — E87.6: ICD-10-CM

## 2023-01-20 DIAGNOSIS — E78.5: ICD-10-CM

## 2023-01-20 DIAGNOSIS — Z99.2: ICD-10-CM

## 2023-01-20 DIAGNOSIS — Z51.5: ICD-10-CM

## 2023-01-20 DIAGNOSIS — Z90.49: ICD-10-CM

## 2023-01-20 DIAGNOSIS — Z88.8: ICD-10-CM

## 2023-01-20 DIAGNOSIS — G47.33: ICD-10-CM

## 2023-01-20 DIAGNOSIS — E87.20: ICD-10-CM

## 2023-01-20 DIAGNOSIS — I13.2: ICD-10-CM

## 2023-01-20 DIAGNOSIS — Z88.5: ICD-10-CM

## 2023-01-20 DIAGNOSIS — J44.1: ICD-10-CM

## 2023-01-20 DIAGNOSIS — R65.20: ICD-10-CM

## 2023-01-20 DIAGNOSIS — N32.9: ICD-10-CM

## 2023-01-20 DIAGNOSIS — N13.6: ICD-10-CM

## 2023-01-20 DIAGNOSIS — D61.818: ICD-10-CM

## 2023-01-20 DIAGNOSIS — I48.20: ICD-10-CM

## 2023-01-20 DIAGNOSIS — Z88.1: ICD-10-CM

## 2023-01-20 DIAGNOSIS — A41.9: Primary | ICD-10-CM

## 2023-01-20 DIAGNOSIS — Z98.51: ICD-10-CM

## 2023-01-20 DIAGNOSIS — Z20.822: ICD-10-CM

## 2023-01-20 DIAGNOSIS — I50.33: ICD-10-CM

## 2023-01-20 DIAGNOSIS — N28.1: ICD-10-CM

## 2023-01-20 LAB
ANISOCYTOSIS BLD QL: (no result)
BUN BLD-MCNC: 78 MG/DL (ref 7–18)
COHGB MFR BLDA: 1.9 % (ref 0–1.5)
GIANT PLATELETS BLD QL SMEAR: (no result)
GLUCOSE SERPLBLD-MCNC: 70 MG/DL (ref 74–106)
HCT VFR BLD CALC: 33.5 % (ref 36–45)
INR BLD: 1.87
LYMPHOCYTES # SPEC AUTO: 1.1 K/UL (ref 0.7–4.9)
MACROCYTES BLD QL: (no result)
MAGNESIUM SERPL-MCNC: 2.6 MG/DL (ref 1.6–2.4)
MCV RBC: 101.9 FL (ref 80–100)
MORPHOLOGY BLD-IMP: (no result)
OVALOCYTES BLD QL SMEAR: (no result)
OXYHGB MFR BLDA: 86.9 % (ref 94–97)
PMV BLD: 8.5 FL (ref 7.6–11.3)
POTASSIUM SERPL-SCNC: 5.5 MMOL/L (ref 3.5–5.1)
RBC # BLD: 3.29 M/UL (ref 3.86–4.86)
SAO2 % BLDA: 89.7 % (ref 92–98.5)
SARS-COV-2 RNA RESP QL NAA+PROBE: NEGATIVE
TROPONIN I SERPL HS-MCNC: 25.1 PG/ML (ref ?–58.9)

## 2023-01-20 PROCEDURE — 71045 X-RAY EXAM CHEST 1 VIEW: CPT

## 2023-01-20 PROCEDURE — 97530 THERAPEUTIC ACTIVITIES: CPT

## 2023-01-20 PROCEDURE — 84484 ASSAY OF TROPONIN QUANT: CPT

## 2023-01-20 PROCEDURE — 84145 PROCALCITONIN (PCT): CPT

## 2023-01-20 PROCEDURE — 94660 CPAP INITIATION&MGMT: CPT

## 2023-01-20 PROCEDURE — 76377 3D RENDER W/INTRP POSTPROCES: CPT

## 2023-01-20 PROCEDURE — 94760 N-INVAS EAR/PLS OXIMETRY 1: CPT

## 2023-01-20 PROCEDURE — 36415 COLL VENOUS BLD VENIPUNCTURE: CPT

## 2023-01-20 PROCEDURE — 96365 THER/PROPH/DIAG IV INF INIT: CPT

## 2023-01-20 PROCEDURE — 83880 ASSAY OF NATRIURETIC PEPTIDE: CPT

## 2023-01-20 PROCEDURE — 87340 HEPATITIS B SURFACE AG IA: CPT

## 2023-01-20 PROCEDURE — 80170 ASSAY OF GENTAMICIN: CPT

## 2023-01-20 PROCEDURE — 88305 TISSUE EXAM BY PATHOLOGIST: CPT

## 2023-01-20 PROCEDURE — 87040 BLOOD CULTURE FOR BACTERIA: CPT

## 2023-01-20 PROCEDURE — 87086 URINE CULTURE/COLONY COUNT: CPT

## 2023-01-20 PROCEDURE — 82805 BLOOD GASES W/O2 SATURATION: CPT

## 2023-01-20 PROCEDURE — 74450 X-RAY URETHRA/BLADDER: CPT

## 2023-01-20 PROCEDURE — 36569 INSJ PICC 5 YR+ W/O IMAGING: CPT

## 2023-01-20 PROCEDURE — 0240U: CPT

## 2023-01-20 PROCEDURE — 51610 INJECTION FOR BLADDER X-RAY: CPT

## 2023-01-20 PROCEDURE — 85610 PROTHROMBIN TIME: CPT

## 2023-01-20 PROCEDURE — 70496 CT ANGIOGRAPHY HEAD: CPT

## 2023-01-20 PROCEDURE — 83735 ASSAY OF MAGNESIUM: CPT

## 2023-01-20 PROCEDURE — 71046 X-RAY EXAM CHEST 2 VIEWS: CPT

## 2023-01-20 PROCEDURE — 96367 TX/PROPH/DG ADDL SEQ IV INF: CPT

## 2023-01-20 PROCEDURE — 70450 CT HEAD/BRAIN W/O DYE: CPT

## 2023-01-20 PROCEDURE — 80061 LIPID PANEL: CPT

## 2023-01-20 PROCEDURE — 74018 RADEX ABDOMEN 1 VIEW: CPT

## 2023-01-20 PROCEDURE — 82947 ASSAY GLUCOSE BLOOD QUANT: CPT

## 2023-01-20 PROCEDURE — 97161 PT EVAL LOW COMPLEX 20 MIN: CPT

## 2023-01-20 PROCEDURE — 85730 THROMBOPLASTIN TIME PARTIAL: CPT

## 2023-01-20 PROCEDURE — 94003 VENT MGMT INPAT SUBQ DAY: CPT

## 2023-01-20 PROCEDURE — 70498 CT ANGIOGRAPHY NECK: CPT

## 2023-01-20 PROCEDURE — 87088 URINE BACTERIA CULTURE: CPT

## 2023-01-20 PROCEDURE — 94002 VENT MGMT INPAT INIT DAY: CPT

## 2023-01-20 PROCEDURE — 99285 EMERGENCY DEPT VISIT HI MDM: CPT

## 2023-01-20 PROCEDURE — 83605 ASSAY OF LACTIC ACID: CPT

## 2023-01-20 PROCEDURE — 96366 THER/PROPH/DIAG IV INF ADDON: CPT

## 2023-01-20 PROCEDURE — 96375 TX/PRO/DX INJ NEW DRUG ADDON: CPT

## 2023-01-20 PROCEDURE — 88108 CYTOPATH CONCENTRATE TECH: CPT

## 2023-01-20 PROCEDURE — 80202 ASSAY OF VANCOMYCIN: CPT

## 2023-01-20 PROCEDURE — 74176 CT ABD & PELVIS W/O CONTRAST: CPT

## 2023-01-20 PROCEDURE — 94640 AIRWAY INHALATION TREATMENT: CPT

## 2023-01-20 PROCEDURE — 90935 HEMODIALYSIS ONE EVALUATION: CPT

## 2023-01-20 PROCEDURE — 85025 COMPLETE CBC W/AUTO DIFF WBC: CPT

## 2023-01-20 PROCEDURE — 93005 ELECTROCARDIOGRAM TRACING: CPT

## 2023-01-20 PROCEDURE — 86706 HEP B SURFACE ANTIBODY: CPT

## 2023-01-20 PROCEDURE — 80048 BASIC METABOLIC PNL TOTAL CA: CPT

## 2023-01-20 PROCEDURE — 80053 COMPREHEN METABOLIC PANEL: CPT

## 2023-01-20 NOTE — EDPHYS
Physician Documentation                                                                           

 [facility]                                                                 

Name: Juli Rushing                                                                             

Age: 67 yrs                                                                                       

Sex: Female                                                                                       

: 1955                                                                                   

MRN: [id number]                                                                                   

Arrival Date: 2023                                                                          

Time: 18:24                                                                                       

Account#: [id number]                                                                            

Bed 20                                                                                            

Private MD: Anthony Elkins                                                                         

ED Physician Jeancarlos Olmos                                                                         

HPI:                                                                                              

                                                                                             

18:47 This 67 yrs old Female presents to ER via Wheelchair with complaints of Breathing       cp  

      Difficulty - low oxygen 60s.                                                                

18:47 The patient has shortness of breath at rest.                                            cp  

18:47 Onset: The symptoms/episode began/occurred gradually, and became worse today. Duration: cp  

      The symptoms are continuous, and are steadily getting worse.                                

18:47 Associated signs and symptoms: Pertinent positives: diarrhea, general weakness,         cp  

      Pertinent negatives: chest pain, productive cough, diaphoresis, fever, vomiting.            

      Severity of symptoms: in the emergency department the symptoms are unchanged despite        

      home interventions.  reports patient missed dialysis yesterday and only              

      partially completed dialysis esday due to low blood pressure.  concerned           

      patient becoming increasingly weak and not able to use walker.                              

                                                                                                  

Historical:                                                                                       

- Allergies:                                                                                      

18:47 Bactrim;                                                                                iw  

18:47 cefepime;                                                                               iw  

18:47 Codeine;                                                                                iw  

18:47 Levofloxacin;                                                                           iw  

18:47 Morphine; itching;                                                                      iw  

18:47 PENICILLINS;                                                                            iw  

18:47 QUINOLONES;                                                                             iw  

- Home Meds:                                                                                      

18:47 Alphagan P ophthalmic (eye) 1 drop twice a day [Active]; amiodarone 200 mg Oral tab 1   iw  

      tab 2 times per day [Active]; apixaban 2.5 mg Oral tab 1 tab 2 times per day [Active];      

      bumetanide 2 mg Oral tab 1 tab once daily [Active]; digoxin 125 mcg (0.125 mg) Oral tab     

      1 tab 3 X weekly [Active]; metoprolol tartrate 25 mg Oral tab 0.5 tab 2 times per day       

      [Active]; midodrine 5 mg Oral tab PRN with dialysis [Active]; midodrine 10 mg Oral tab      

      1 tab Q8H, don't take before dialysis, bring pills to traetment. [Active]; pantoprazole     

      40 mg Oral TbEC 1 tab once daily [Active];                                                  

- PMHx:                                                                                           

18:47 Atrial fibrillation; Congestive heart failure; Hypertensive disorder; stage 4 kidney    iw  

      failure; High Cholesterol; Dialysis;                                                        

- PSHx:                                                                                           

18:47 back surgery; Dialysis catheter LEFT upper chest;                                       iw  

                                                                                                  

- Immunization history:: Adult Immunizations unknown.                                             

- Social history:: Smoking status: unknown.                                                       

                                                                                                  

                                                                                                  

ROS:                                                                                              

18:50 Constitutional: Negative for body aches, chills, fever.                                 cp  

18:50 Eyes: Negative for injury, pain, redness, and discharge.                                cp  

18:50 ENT: Negative for drainage from ear(s), ear pain, sore throat, difficulty swallowing,       

      difficulty handling secretions.                                                             

18:50 Cardiovascular: Positive for edema, Negative for chest pain.                                

18:50 Respiratory: Negative for cough.                                                            

18:50 Abdomen/GI: Positive for diarrhea, Negative for abdominal pain, vomiting, constipation,     

      black/tarry stool.                                                                          

18:50 Neuro: Positive for weakness.                                                               

18:50 All other systems are negative.                                                             

                                                                                                  

Exam:                                                                                             

18:55 Constitutional: The patient appears in no acute distress, non-diaphoretic, non-toxic,   cp  

      well developed, well nourished, obese.                                                      

18:55 Head/Face:  Normocephalic, atraumatic.                                                  cp  

18:55 Eyes: Periorbital structures: appear normal, Pupils: equal, round, and reactive to          

      light and accomodation, Extraocular movements: intact throughout, Conjunctiva: normal,      

      no exudate, no injection, Sclera: no appreciated abnormality, Lids and lashes: appear       

      normal, bilaterally.                                                                        

18:55 ENT: External ear(s): are unremarkable, Nose: is normal, Mouth: Lips: dry, Oral mucosa:     

      moist, Posterior pharynx: Airway: no evidence of obstruction, patent.                       

18:55 Neck: ROM/movement: is normal, is supple, without pain, no range of motions                 

      limitations, no meningismus.                                                                

18:55 Chest/axilla: Inspection: normal.                                                           

18:55 Cardiovascular: Rate: normal, Rhythm: irregular, Edema: ankle edema, that is moderate,      

      JVD: is not appreciated.                                                                    

18:55 Respiratory: the patient does not display signs of respiratory distress,  Respirations:     

      shallow respirations, that is mild, Breath sounds: decreased breath sounds, that are        

      moderate, throughout, stridor, is not appreciated.                                          

18:55 Abdomen/GI: Inspection: obese Palpation: abdomen is soft and non-tender, in all             

      quadrants.                                                                                  

18:55 Skin: cellulitis, is not appreciated.                                                       

18:55 Neuro: Orientation: to person, place, situation, Mentation: able to follow commands,        

      slow to respond, Motor: moves all fours, general weakness with no focal deficits,           

      Sensation: no obvious gross deficits.                                                       

19:45 ECG was reviewed by the Attending Physician.                                            cp  

                                                                                                  

Vital Signs:                                                                                      

18:44 BP 97 / 68; Pulse 82; Resp 20; Temp 97.8; Pulse Ox 89% on 4 lpm NC; Weight 108.86 kg;   iw  

      Height 5 ft. 7 in. (170.18 cm);                                                             

19:15  / 83; Pulse 88; Resp 20; Pulse Ox 94% on 5 lpm NC;                               ha1 

20:50  / 89; Pulse 89; Resp 18 S; Pulse Ox 94% on 40% BiPAP;                            ha1 

21:50  / 90; Pulse 80; Resp 16 S; Pulse Ox 94% on 40% BiPAP;                            ha1 

22:50  / 122; Pulse 85; Resp 25 S; Pulse Ox 95% on 40% BiPAP;                           ha1 

23:18  / 110; Pulse 89; Resp 19 S; Pulse Ox 95% on 40% BiPAP;                           ha1 

23:50 BP 90 / 50; Pulse 75; Resp 24 S; Pulse Ox 94% on 40% BiPAP;                             ha1 

                                                                                             

00:10 BP 95 / 58; Pulse 75; Resp 24 S; Pulse Ox 93% on 40% BiPAP;                             ha1 

00:48  / 64; Pulse 76; Resp 25 S; Temp 97.6; Pulse Ox 95% on 40% BiPAP;                 ha1 

01:30 BP 93 / 58; Pulse 70; Resp 22 S; Pulse Ox 94% on 40% BiPAP;                             ha1 

02:30  / 50; Pulse 70; Resp 22 S; Pulse Ox 94% on 40% BiPAP;                            ha1 

03:00  / 64; Pulse 80; Resp 19 S; Pulse Ox 95% on 40% BiPAP;                            ha1 

                                                                                             

18:44 Body Mass Index 37.59 (108.86 kg, 170.18 cm)                                            iw  

                                                                                                  

MDM:                                                                                              

                                                                                             

18:52 Patient medically screened.                                                             cp  

21:25 ED course: consult with DR Elkins regarding patient condition. After discussion does not cp  

      want antibiotics, CT abdomen/pelvis at this time. Requests admission to floor and not       

      ICU.                                                                                        

21:30 Data reviewed: vital signs, nurses notes, lab test result(s), EKG, radiologic studies,  cp  

      plain films.                                                                                

21:30 Consideration of Admission/Observation Patient was admitted/placed on observation.      cp  

      Management of patient was discussed with the following: Consultant: DR Elkins. I             

      considered the following discharge prescriptions or medication management in the            

      emergency department Medications were administered in the Emergency Department. See         

      MAR. Independent interpretation of the following test(s) in the Emergency Department        

      EKG: See my EKG interpretation above. Test considered but Not performed: Other Details      

      CT abdomen/pelvis. Historians other than the Patient: Spouse/Significant Other:      

      provides HPI. Care significantly affected by the following chronic conditions:              

      Hypertension, Congestive Heart Failure, Obesity, Chronic Kidney Disease.                    

23:10 ED course: discussed elevated blood pressure with DR Bartholomew. Recommends clonidine 0.1 cp  

      at 2330 and may repeat 30 minutes later if blood pressure remains elevated.                 

                                                                                                  

                                                                                             

19:01 Order name: Basic Metabolic Panel; Complete Time: 20:05                                 cp  

                                                                                             

20:05 Interpretation: Normal except: ; K 5.5; CO2 19; ANION GAP 22.5; GLUC 70; CRE      cp  

      9.41; BUN 78; GFR 4.                                                                        

                                                                                             

19:01 Order name: CBC with Diff; Complete Time: 20:05                                         cp  

                                                                                             

20:34 Interpretation: Normal except: WBC 14.10; RBC 3.29; HGB 9.9; HCT 33.5; .9; MCHC  cp  

      29.4; RDW 19.2; ANNA% 86.4; LYM% 7.5; NEUT A 12.2.                                           

                                                                                             

19:01 Order name: Magnesium; Complete Time: 20:05                                             cp  

                                                                                             

19:01 Order name: NT PRO-BNP; Complete Time: 20:05                                            cp  

                                                                                             

19:01 Order name: PT-INR; Complete Time: 19:44                                                cp  

                                                                                             

19:45 Interpretation: Reviewed.                                                               cp  

                                                                                             

19:01 Order name: Troponin HS; Complete Time: 20:05                                           cp  

                                                                                             

19:02 Order name: SARS RAPID                                                                  la1 

                                                                                             

19:02 Order name: COVID-19/FLU A+B; Complete Time: 20:10                                      cp  

                                                                                             

19:03 Order name: ABG; Complete Time: 21:10                                                   cp  

                                                                                             

19:46 Order name: Manual Differential; Complete Time: 20:05                                   EDMS

                                                                                             

20:16 Order name: Blood Culture Adult (2)                                                     cp  

                                                                                             

20:16 Order name: Urine Microscopic Only                                                      cp  

                                                                                             

20:16 Order name: Lactate w/ 2H reflex if indic.; Complete Time: 22:36                        cp  

                                                                                             

22:36 Interpretation: Reviewed.                                                                 

                                                                                             

19:01 Order name: XRAY Chest (1 view); Complete Time: 19:44                                     

                                                                                             

19:45 Interpretation: Report review.                                                            

                                                                                             

20:16 Order name: CDIFF                                                                         

                                                                                             

20:16 Order name: Ova And Parasites                                                             

                                                                                             

20:16 Order name: Rotavirus Antigen                                                             

                                                                                             

20:16 Order name: Stool Culture                                                                 

                                                                                             

01:13 Order name: Basic Metabolic Panel                                                       EDMS

                                                                                             

01:13 Order name: Basic Metabolic Panel                                                       EDMS

                                                                                             

01:13 Order name: CBC with Automated Diff                                                     EDMS

                                                                                             

01:13 Order name: CBC with Automated Diff                                                     EDMS

                                                                                             

01:13 Order name: NT PRO-BNP                                                                  [facility]

                                                                                             

01:13 Order name: NT PRO-BNP                                                                  EDMS

                                                                                             

01:16 Order name: Lactate Sepsis 2 HR Follow-up; Complete Time: 01:36                         EDMS

                                                                                             

19:01 Order name: EKG; Complete Time: 19:02                                                     

                                                                                             

19:01 Order name: Cardiac monitoring; Complete Time: 19:29                                      

                                                                                             

19:01 Order name: EKG - Nurse/Tech; Complete Time: 19:40                                        

                                                                                             

19:01 Order name: IV Saline Lock; Complete Time: 19:29                                          

                                                                                             

19:01 Order name: Labs collected and sent; Complete Time: 19:29                                 

                                                                                             

19:01 Order name: O2 Per Protocol; Complete Time: 19:29                                       cp  

                                                                                             

19:01 Order name: O2 Sat Monitoring; Complete Time: 19:29                                     cp  

                                                                                             

20:16 Order name: Cath                                                                        cp  

                                                                                             

20:16 Order name: Urine Dipstick-Ancillary (obtain specimen)                                    

                                                                                             

20:57 Order name: Misc. Order: D10W at 40 cc/hr                                               cp  

                                                                                                  

EC:45 Rate is 82 beats/min. Rhythm is irregular. QRS interval is prolonged at 106 msec. QT    cp  

      interval is normal. Interpreted by me. Reviewed by me.                                      

                                                                                                  

Administered Medications:                                                                         

19:39 Drug: Albuterol - atroVENT (ipratropium) (3:1) (2.5 mg - 0.5 mg) 3 ml Route: Nebulizer; ha1 

19:50 Follow up: Response: No adverse reaction                                                ha1 

21:30 Drug: Sodium Bicarbonate 1 amp Route: IVP; Site: right forearm;                         ha1 

22:00 Follow up: Response: No adverse reaction                                                ha1 

22:05 Drug: hydrALAZINE 10 mg Route: IVP; Site: right forearm;                                ha1 

22:10 Drug: D50W 25 ml Route: IVP; Site: right forearm;                                       ha1 

23:00 Follow up: Response: No adverse reaction                                                ha1 

22:10 Drug: Calcium Gluconate 1 grams Route: IVPB; Infused Over: 60 mins; Site: right forearm;ha1 

23:20 Follow up: Response: No adverse reaction; IV Status: Completed infusion; IV Intake: [id number] 

22:50 Drug: hydrALAZINE 10 mg Route: IVP; Site: right forearm;                                ha1 

23:18 Follow up: Response: No adverse reaction; Blood pressure is unchanged                   ha1 

23:19 Not Given (not in inventory): Lokelma Powder 10 grams PO once                           ha1 

23:54 Drug: NS 0.9% 250 ml Route: IV; Rate: bolus; Site: right forearm;                       ha1 

                                                                                             

02:00 Follow up: Response: No adverse reaction; IV Status: Completed infusion; IV Intake:     ha1 

      250ml                                                                                       

00:55 CANCELLED (Physician Discretion): Norepinephrine 0.1 mcg/kg/min IV at calculated rate   cp  

      Per protocol; (Standard concentration 4 mg / 250 mL D5W); Recommended max rate 3            

      mcg/kg/min; Titrate 0.05 mcg/kg/min as often as every 5 minutes to achieve goal (see        

      titration policy); Goal parameter MAP greater than 65 mmHg.                                 

                                                                                                  

                                                                                                  

Disposition:                                                                                      

                                                                                             

19:16 Co-signature as Attending Physician, Jeancarlos VARGAS was immediately available on-site ms3 

      in the Emergency Department for consultation in the care of the patient.                    

                                                                                                  

Disposition Summary:                                                                              

23 21:29                                                                                    

Hospitalization Ordered                                                                           

      Hospitalization Status: Inpatient Admission                                             cp  

      Provider: Anthony Elkins cp  

      Condition: Serious                                                                      cp  

      Problem: new                                                                            cp  

      Symptoms: have improved                                                                 cp  

      Bed/Room Type: Standard                                                                 cp  

      Location: Intensive Care Unit(23 01:42)                                           cg  

      Room Assignment: 3-(23 01:44)                                                     cg  

      Diagnosis                                                                                   

        - Acidosis                                                                            cp  

        - Acute and chronic respiratory failure                                               cp  

        - Diarrhea, unspecified                                                               cp  

      Forms:                                                                                      

        - Medication Reconciliation Form                                                      cp  

        - SBAR form                                                                           cp  

Signatures:                                                                                       

Dispatcher [employer]                           EDMS                                                 

Arnold Larsen MD MD cha Williams, Irene, RN                     RN   iw                                                   

Saqib Qureshi, FNP-C                      FNP-Cla1                                                  

Arnold Sood PA PA   cp                                                   

Kendra Barnes, ELPIDIO                       RN   Jeancarlos Sol,                         DO   ms3                                                  

Lori Giles RN                        RN   ha1                                                  

                                                                                                  

Corrections: (The following items were deleted from the chart)                                    

: 20:20 Abdomen ordered. EDMS                                                             EDMS

21:47 19:24 Arterial Blood Gas+RC.LAB.BRZ ordered. EDMS                                       EDMS

                                                                                             

00:55 00:49 Norepinephrine 0.1 mcg/kg/min IV at calculated rate Per protocol; (Standard       cp  

      concentration 4 mg / 250 mL D5W); Recommended max rate 3 mcg/kg/min; Titrate 0.05           

      mcg/kg/min as often as every 5 minutes to achieve goal (see titration policy); Goal         

      parameter MAP greater than 65 mmHg. ordered. cp                                             

: 21:29 Telemetry/MedSurg (Inpatient) cp                                            cg  

                                                                                             

01:42  21:29 cp                                                                          cg  

                                                                                             

01:44 01:42 7- cg                                                                             cg  

                                                                                             

01:54  18:47  reports patient missed dialysis yesterday and only partially        cp  

      completed dialysis Tuesday due to low blood pressure.  concerned patient             

      becoming increasingly weak. cp                                                              

                                                                                             

02:02  21:25 ED course: consult with DR Elkins regarding patient condition. After         cp  

      discussion does not want antibiotics, CT abdomen/pelvis at this time. cp                    

                                                                                                  

**************************************************************************************************

## 2023-01-20 NOTE — RAD REPORT
EXAM DESCRIPTION:  RAD - Chest Single View - 1/20/2023 7:29 pm

 

CLINICAL HISTORY:  SOB

 

COMPARISON:  Portable 01/14/2023

 

TECHNIQUE:  AP portable chest image was obtained 1/20/2023 7:29 pm .

 

FINDINGS:  Exam is quite limited. There shallow inspiration which limits lung fill assessment. CT ass
essment is further limited by a the patient has known significant right hemidiaphragm elevation. 
josh right base atelectasis changes are present.

 

No focal lung parenchymal process seen in the left hemithorax and in the superior aspect of the right
 hemithorax.

 

Heart size is stable, magnified by the shallow inspiration and portable technique. No significant romeo
lure or volume overload suspected. Double-lumen dialysis catheter is in place. Loop recorder overlies
 the left lung base. Small right pleural effusion is suspected. No pneumothorax seen.

 

 No acute bony abnormality seen. No acute aortic findings suspected.

 

IMPRESSION:  No acute lung parenchymal process suspected.

 

Chronic right base atelectasis is again noted. There is probably a small right pleural effusion.

## 2023-01-20 NOTE — ER
Nurse's Notes                                                                                     

 [facility]                                                                 

Name: Juli Rushing                                                                             

Age: 67 yrs                                                                                       

Sex: Female                                                                                       

: 1955                                                                                   

MRN: [id number]                                                                                   

Arrival Date: 2023                                                                          

Time: 18:24                                                                                       

Account#: [id number]                                                                            

Bed 20                                                                                            

Private MD: Anthony Elkins                                                                         

Diagnosis: Acidosis;Acute and chronic respiratory failure;Diarrhea, unspecified                   

                                                                                                  

Presentation:                                                                                     

                                                                                             

18:44 Chief complaint: Spouse and/or significant other states: she has had diarrhea and       iw  

      vomiting X 3 days and she missed dialysis, last dialyzed on Tuesday and did not             

      complete it , she has been out of it, I can't keep her O2 level above 80-90% on her         

      home O2. Coronavirus screen: Client presents with at least one sign or symptom that may     

      indicate coronavirus-19. Ebola Screen: Patient negative for fever greater than or equal     

      to 101.5 degrees Fahrenheit, and additional compatible Ebola Virus Disease symptoms         

      Patient denies exposure to infectious person. Patient denies travel to an                   

      Ebola-affected area in the 21 days before illness onset. No symptoms or risks               

      identified at this time. Initial Sepsis Screen: Does the patient meet any 2 criteria?       

      No. Patient's initial sepsis screen is negative. Does the patient have a suspected          

      source of infection? No. Patient's initial sepsis screen is negative. Risk Assessment:      

      Do you want to hurt yourself or someone else? Patient reports no desire to harm self or     

      others. Onset of symptoms was 2023.                                             

18:44 Method Of Arrival: Wheelchair                                                           iw  

18:44 Acuity: MIRTHA 2                                                                           iw  

                                                                                                  

Triage Assessment:                                                                                

19:20 Respiratory: the patient has moderate shortness of breath.                              ha1 

                                                                                                  

Historical:                                                                                       

- Allergies:                                                                                      

18:47 Bactrim;                                                                                iw  

18:47 cefepime;                                                                               iw  

18:47 Codeine;                                                                                iw  

18:47 Levofloxacin;                                                                           iw  

18:47 Morphine; itching;                                                                      iw  

18:47 PENICILLINS;                                                                            iw  

18:47 QUINOLONES;                                                                             iw  

- Home Meds:                                                                                      

18:47 Alphagan P ophthalmic (eye) 1 drop twice a day [Active]; amiodarone 200 mg Oral tab 1   iw  

      tab 2 times per day [Active]; apixaban 2.5 mg Oral tab 1 tab 2 times per day [Active];      

      bumetanide 2 mg Oral tab 1 tab once daily [Active]; digoxin 125 mcg (0.125 mg) Oral tab     

      1 tab 3 X weekly [Active]; metoprolol tartrate 25 mg Oral tab 0.5 tab 2 times per day       

      [Active]; midodrine 5 mg Oral tab PRN with dialysis [Active]; midodrine 10 mg Oral tab      

      1 tab Q8H, don't take before dialysis, bring pills to traetment. [Active]; pantoprazole     

      40 mg Oral TbEC 1 tab once daily [Active];                                                  

- PMHx:                                                                                           

18:47 Atrial fibrillation; Congestive heart failure; Hypertensive disorder; stage 4 kidney    iw  

      failure; High Cholesterol; Dialysis;                                                        

- PSHx:                                                                                           

18:47 back surgery; Dialysis catheter LEFT upper chest;                                       iw  

                                                                                                  

- Immunization history:: Adult Immunizations unknown.                                             

- Social history:: Smoking status: unknown.                                                       

                                                                                                  

                                                                                                  

Screenin:15 Abuse screen: Denies threats or abuse. Denies injuries from another. Nutritional        ha1 

      screening: No deficits noted. Tuberculosis screening: No symptoms or risk factors           

      identified.                                                                                 

19:20 [facility] ED Fall Risk Assessment (Adult) History of falling in the last 3 months,       ha1 

      including since admission No falls in past 3 months (0 pts) Confusion or Disorientation     

      No (0 pts) Intoxicated or Sedated No (0 pts) Impaired Gait Yes (1 pt) Mobility Assist       

      Device Used Yes (1 pt) Altered Elimination No (0 pt) Score/Fall Risk Level 0 - 2 = Low      

      Risk Oriented to surroundings, Maintained a safe environment, Educated pt \T\ family on     

      fall prevention, incl call for assistance when getting out of bed, Assessed \T\             

      reinforced patient's understanding of fall precautions, Provided non-skid footwear,         

      Hourly rounding (assess needs \T\ fall precautionary measures) done.                        

                                                                                                  

Assessment:                                                                                       

19:15 General: Appears comfortable, Behavior is calm, cooperative. Pain: Denies pain. Neuro:  ha1 

      Level of Consciousness is awake, alert, obeys commands, Oriented to person, place,          

      time, situation. Cardiovascular: Heart tones S1 S2 present Capillary refill < 3 seconds     

      Patient's skin is warm and dry. Rhythm is sinus rhythm. Respiratory: Airway is patent       

      Respiratory effort is even, unlabored, Respiratory pattern is regular, symmetrical.         

      Respiratory: Reports shortness of breath at rest and low oxygen saturation Breath           

      sounds are clear bilaterally. GI: No signs and/or symptoms were reported involving the      

      gastrointestinal system. Abdomen is round non-distended, Bowel sounds present X 4           

      quads. : No signs and/or symptoms were reported regarding the genitourinary system.       

      EENT: No signs and/or symptoms were reported regarding the EENT system. Derm: Skin is       

      dry, Skin is normal. Musculoskeletal: Circulation, motion, and sensation intact.            

19:40 Reassessment: respiratory therapy in the room. informed pt. about the need for Bi pad.  ha1 

      ABGs results were reported to care provider. respiratory therapist in the room.             

19:50 Reassessment: Patient and/or family updated on plan of care and expected duration. Pain ha1 

      level reassessed. notified care provider Patient states symptoms have not improved.         

20:50 Reassessment: Patient and/or family updated on plan of care and expected duration. Pain ha1 

      level reassessed. Patient is alert, oriented x 3, equal unlabored respirations, skin        

      warm/dry/pink. Patient states symptoms have improved.                                       

20:50 Respiratory: Respiratory effort is even, unlabored, Respiratory pattern is tachypnea.   ha1 

21:20 Reassessment: Patient and/or family updated on plan of care and expected duration. Pain ha1 

      level reassessed.                                                                           

21:55 Reassessment: Patient and/or family updated on plan of care and expected duration. Pain ha1 

      level reassessed. elevated blood pressure. notified care provider.                          

22:50 Reassessment: Patient and/or family updated on plan of care and expected duration. Pain ha1 

      level reassessed. elevated blood pressure. BP unchanged not responding to treatment.        

      notified care provider.                                                                     

22:50 Respiratory: Respiratory effort is even, unlabored, Respiratory pattern is tachypnea.   ha1 

23:50 Reassessment: Patient and/or family updated on plan of care and expected duration. Pain ha1 

      level reassessed. low BP. reported to care provider.                                        

23:50 Respiratory: Respiratory effort is even, unlabored, Respiratory pattern is tachypnea.   ha1 

                                                                                             

00:50 Reassessment: Patient and/or family updated on plan of care and expected duration. Pain ha1 

      level reassessed.                                                                           

00:50 Respiratory: Respiratory effort is even, unlabored, Respiratory pattern is tachypnea.   ha1 

01:30 Reassessment: Patient is alert, oriented x 3, equal unlabored respirations, skin        ha1 

      warm/dry/pink. eyes closed.                                                                 

02:00 Reassessment: pt. wanting to remove Bi pad. explained the importance of keeping Bi pad. ha1 

      pt. agreed to keep mask on place.                                                           

02:41 Reassessment: Patient and/or family updated on plan of care and expected duration. Pain ha1 

      level reassessed. awaiting for respiratory therapist to transfer pt to ICU.                 

03:10 Reassessment: being transported to ICU connected to Bi pad with respiratory at bedside. ha1 

                                                                                                  

Vital Signs:                                                                                      

                                                                                             

18:44 BP 97 / 68; Pulse 82; Resp 20; Temp 97.8; Pulse Ox 89% on 4 lpm NC; Weight 108.86 kg;   iw  

      Height 5 ft. 7 in. (170.18 cm);                                                             

19:15  / 83; Pulse 88; Resp 20; Pulse Ox 94% on 5 lpm NC;                               ha1 

20:50  / 89; Pulse 89; Resp 18 S; Pulse Ox 94% on 40% BiPAP;                            ha1 

21:50  / 90; Pulse 80; Resp 16 S; Pulse Ox 94% on 40% BiPAP;                            ha1 

22:50  / 122; Pulse 85; Resp 25 S; Pulse Ox 95% on 40% BiPAP;                           ha1 

23:18  / 110; Pulse 89; Resp 19 S; Pulse Ox 95% on 40% BiPAP;                           ha1 

23:50 BP 90 / 50; Pulse 75; Resp 24 S; Pulse Ox 94% on 40% BiPAP;                             ha1 

                                                                                             

00:10 BP 95 / 58; Pulse 75; Resp 24 S; Pulse Ox 93% on 40% BiPAP;                             ha1 

00:48  / 64; Pulse 76; Resp 25 S; Temp 97.6; Pulse Ox 95% on 40% BiPAP;                 ha1 

01:30 BP 93 / 58; Pulse 70; Resp 22 S; Pulse Ox 94% on 40% BiPAP;                             ha1 

02:30  / 50; Pulse 70; Resp 22 S; Pulse Ox 94% on 40% BiPAP;                            ha1 

03:00  / 64; Pulse 80; Resp 19 S; Pulse Ox 95% on 40% BiPAP;                            ha1 

                                                                                             

18:44 Body Mass Index 37.59 (108.86 kg, 170.18 cm)                                            iw  

                                                                                                  

ED Course:                                                                                        

                                                                                             

18:24 Patient arrived in ED.                                                                  am2 

18:25 Anthony Elkins MD is Private Physician.                                                 am2 

18:25 [facility]                                                                cp  

18:25 Jeancarlos Olmos DO is Attending Physician.                                                cp  

18:47 Triage completed.                                                                       iw  

18:48 Arm band placed on.                                                                     iw  

19:10 Client placed on continuous cardiac and pulse oximetry monitoring. NIBP monitoring      ha1 

      applied.                                                                                    

19:13 Inserted saline lock: 22 gauge in right forearm, using aseptic technique. Blood         ds4 

      collected.                                                                                  

19:15 Patient has correct armband on for positive identification. Placed in gown. Bed in low  ha1 

      position. Call light in reach. Side rails up X 1. Adult w/ patient.                         

19:31 XRAY Chest (1 view) In Process Unspecified.                                             EDMS

19:38 Lori Giles RN is Primary Nurse.                                                      ha1 

21:28 Anthony Elkins MD is Hospitalizing Provider.                                            cp  

22:50 Door closed. Noise minimized. Lights dimmed. Warm blanket given.                        ha1 

                                                                                             

02:44 No provider procedures requiring assistance completed. Patient admitted, IV remains in  ha1 

      place.                                                                                      

                                                                                                  

Administered Medications:                                                                         

                                                                                             

19:39 Drug: Albuterol - atroVENT (ipratropium) (3:1) (2.5 mg - 0.5 mg) 3 ml Route: Nebulizer; ha1 

19:50 Follow up: Response: No adverse reaction                                                ha1 

21:30 Drug: Sodium Bicarbonate 1 amp Route: IVP; Site: right forearm;                         ha1 

22:00 Follow up: Response: No adverse reaction                                                ha1 

22:05 Drug: hydrALAZINE 10 mg Route: IVP; Site: right forearm;                                ha1 

22:10 Drug: D50W 25 ml Route: IVP; Site: right forearm;                                       ha1 

23:00 Follow up: Response: No adverse reaction                                                ha1 

22:10 Drug: Calcium Gluconate 1 grams Route: IVPB; Infused Over: 60 mins; Site: right forearm;ha1 

23:20 Follow up: Response: No adverse reaction; IV Status: Completed infusion; IV Intake: [id number] 

22:50 Drug: hydrALAZINE 10 mg Route: IVP; Site: right forearm;                                ha1 

23:18 Follow up: Response: No adverse reaction; Blood pressure is unchanged                   ha1 

23:19 Not Given (not in inventory): Lokelma Powder 10 grams PO once                           ha1 

23:54 Drug: NS 0.9% 250 ml Route: IV; Rate: bolus; Site: right forearm;                       ha1 

                                                                                             

02:00 Follow up: Response: No adverse reaction; IV Status: Completed infusion; IV Intake:     ha1 

      250ml                                                                                       

00:55 CANCELLED (Physician Discretion): Norepinephrine 0.1 mcg/kg/min IV at calculated rate   cp  

      Per protocol; (Standard concentration 4 mg / 250 mL D5W); Recommended max rate 3            

      mcg/kg/min; Titrate 0.05 mcg/kg/min as often as every 5 minutes to achieve goal (see        

      titration policy); Goal parameter MAP greater than 65 mmHg.                                 

                                                                                                  

                                                                                                  

Medication:                                                                                       

03:15 VIS not applicable for this client.                                                     ha1 

                                                                                                  

Intake:                                                                                           

                                                                                             

23:20 IV: 50ml; Total: 50ml.                                                                  ha1 

                                                                                             

02:00 IV: 250ml; Total: 300ml.                                                                ha1 

                                                                                                  

Outcome:                                                                                          

                                                                                             

21:29 Decision to Hospitalize by Provider.                                                    cp  

                                                                                             

02:44 Condition: stable                                                                       ha1 

03:10 Patient left the ED.                                                                    ha1 

03:10 Admitted to ICU accompanied by nurse, via stretcher, room 3, with oxygen, on monitor,       

      with chart, Report called to  ELPIDIO Hoff                                                  

03:10 Instructed on the need for admit.                                                           

                                                                                                  

Signatures:                                                                                       

Dispatcher [employer]                           EDSuzy Polo RN RN iw Swanson, Donovan                             ds4                                                  

Arnold Sood PA PA cp Moreno, Amanda                               am2                                                  

Lori Giles RN                        RN   ha1                                                  

                                                                                                  

Corrections: (The following items were deleted from the chart)                                    

                                                                                             

18:49 18:44 Chief complaint: Spouse and/or significant other states: she has had diarrhea and iw  

      vomiting X 3 days and she missed dialysis, last dialyzed on Tuesday and did not             

      complete it , she has been out of it, I can't keep her O2 level above 80-90% on her         

      home O2 iw                                                                                  

18:50 18:44  / ???; Pulse 82bpm; Resp 20bpm; Pulse Ox 89% 4 lpm Nasal Cannula; iw       iw  

18:50 18:44  / ???; Pulse 82bpm; Resp 20bpm; Pulse Ox 89% 4 lpm Nasal Cannula; Temp     iw  

      97.8F; 108.86 kg; Height 5 ft. 7 in.; BMI: 37.5; iw                                         

                                                                                             

04:22  20:10 Reassessment: Patient and/or family updated on plan of care and expected    ha1 

      duration. Pain level reassessed. notified care provider Patient states symptoms have        

      not improved. ha                                                                                             

04:22  19:40 Reassessment: respiratory therapy in the room. informed about the need for  ha1 

      Bi pad.                                                                                              

04:26 04:25 Response: No adverse reaction; IV Status: Completed infusion; IV Intake: 250ml ha1 

04:31  20:10 Reassessment: Patient and/or family updated on plan of care and expected    ha1 

      duration. Pain level reassessed. notified care provider Patient states symptoms have        

      not improved. ha                                                                                             

04:34  20:55 Reassessment: Patient and/or family updated on plan of care and expected    ha1 

      duration. Pain level reassessed. elevated blood pressure. notified care provider [facility]        

                                                                                             

04:42  22:50 Reassessment: Patient and/or family updated on plan of care and expected    ha1 

      duration. Pain level reassessed. elevated blood pressure. BP unchanged not responding       

      to treatment. notified care provider [facility]                                                    

                                                                                             

04:44 04:23 Reassessment: Patient and/or family updated on plan of care and expected          ha1 

      duration. Pain level reassessed. Patient is alert, oriented x 3, equal unlabored            

      respirations, skin warm/dry/pink. ha                                                       

04:46  21:55 Reassessment: Patient and/or family updated on plan of care and expected    ha1 

      duration. Pain level reassessed. elevated blood pressure. notified care provider [facility]        

                                                                                             

04:53 03:10 Reassessment: being transported to ICU connected to Bi pad with respiratory at    [facility] 

      bedside  03:14 Instructed on the need for admit, [facility]                                              03:14 Admitted to ICU accompanied by nurse, via stretcher, room 3, with oxygen, on      [facility] 

      monitor, with chart, Report called to ELPIDIO Hoff [id number] 03:17 Patient left the ED. [place] [id number] 

                                                                                                  

**************************************************************************************************

## 2023-01-20 NOTE — XMS REPORT
Continuity of Care Document

                           Created on:2023



Patient:SUZI SEARS

Sex:Female

:1955

External Reference #:[id number]





Demographics







                          Address                   [address]



                                                    [place] [address]

 

                          Home Phone                [phone]

 

                          Work Phone                [phone]

 

                          Mobile Phone              [phone]

 

                          Email Address             [email or web address]

 

                          Preferred Language        English

 

                          Marital Status            Unknown

 

                          Zoroastrianism Affiliation     Unknown

 

                          Race                      Unknown

 

                          Additional Race(s)        Unavailable



                                                    White

 

                          Ethnic Group              Unknown









Author







                          Organization              [facility]

t

 

                          Address                   [address]



                                                    [place] [address]

 

                          Phone                     [phone]









Support







                Name            Relationship    Address         Phone

 

                JEIMY SEARS SPOU            [address] [phone]



                                                [place] [address] 

 

                JEIMY SEARS SPOU            [address] [phone]



                                                [place] [address] 

 

                JEIMY SEARS EDUARDO SP              [address] [phone]

[phone]



                                                [place] [address] 

 

                JEIMY SEARS SPOU            [address]       [phone]

[phone]



                                                [place] [address] 

 

                CHITRA SEARS    SPOU            [address]       [phone]



                                                [place] [address] 

 

                Jeimy Sears Spouse          [phone]

[phone]



                                                [place] [address] 

 

                Jonah Sears   Child           Unavailable     [phone]

 

                Kyler Sears   Child           Unavailable     [phone]









Care Team Providers







                    Name                Role                Phone

 

                    Peter WIGGINS, Charisse MARTINEZ Primary Care Physician [phone]

 

                    Kd Sylvester Attending Clinician Unavailable

 

                    Clark Cuellar   Attending Clinician Unavailable

 

                    Luma Beckham MD   Attending Clinician [phone]

 

                    Joanna WIGGINS, Sanket Dukes Attending Clinician [phone]

 

                    Vinayak Broussard MD   Attending Clinician [phone]

 

                    VINAYAK BROUSSARD      Attending Clinician Unavailable

 

                    Gregoria Pulido MA   Attending Clinician Unavailable

 

                    Miriam WIGGINS, Suresh Landry Attending Clinician [phone]

 

                    Lane WIGGINS, Zhen Steward Attending Clinician [phone]

[phone]

 

                    Stephen WIGGINS, Faye    Attending Clinician [phone]

 

                    Trinidad WIGGINS, Kumar Valadez Attending Clinician [phone]

[phone]

 

                    Sourav Sparks MD Attending Clinician [phone]

 

                    Vadim WIGGINS, Daylin   Attending Clinician [phone]

 

                    Jacobo WIGGINS, Quinton Gonsalez Attending Clinician [phone]

[phone]

 

                    Jean-Claude WIGGINS, Martina  Attending Clinician [phone]

 

                    Louann Odom MA Attending Clinician Unavailable

 

                    Venancio Barajas      Attending Clinician Unavailable

 

                    Koko         Attending Clinician Unavailable

 

                    Addison WIGGINS, Louann Carrizales Attending Clinician [phone]

 

                    Aki Dupree MD      Attending Clinician [phone]

 

                    Luc WIGGINS, Shelby Attending Clinician [phone]

 

                    Fantasma Squires       Attending Clinician [phone]

 

                    Charisse Green  Attending Clinician [phone]

 

                    Maureen WIGGINS, Martha Chu Attending Clinician [phone]

[phone]

 

                    Parveen Hutchinson MD Attending Clinician [phone]

 

                    Shaikh ROSALIE, Rosalio     Attending Clinician [phone]

 

                    Christine Saini DO Attending Clinician [phone]

 

                    Edinson WIGGINS, Trev Hays Attending Clinician [phone]

 

                    Hasmukh WIGGINS, Mariluz Sanchez Attending Clinician [phone]

 

                    Suzie Dominguez       Attending Clinician Unavailable

 

                    Hadley Medina         Attending Clinician Unavailable

 

                    Antionette MAGALLANES, Joana      Attending Clinician Unavailable

 

                    Nieves WIGGINS, Melissa PHAM Attending Clinician [phone]

[phone]

 

                    Lazaro WIGGINS, Nsaeem Attending Clinician [phone]

 

                    Elaina Clark MD Attending Clinician [phone]

 

                    Brenna Jacobs MD Attending Clinician [phone]

 

                    DEBBY_EVELIN_Violet      Attending Clinician Unavailable

 

                    MD MARTHA LAMAR Attending Clinician Unavailable

 

                    JULIANNA KEANE  Attending Clinician Unavailable

 

                    MAGY EARL     Attending Clinician Unavailable

 

                    MELVIN MANZO   Attending Clinician Unavailable

 

                    MD MELVIN MANZO Attending Clinician Unavailable

 

                    MD DAYLIN LITTLE OBISSA Attending Clinician Unavailable

 

                    MD MELISSA PICKETT Attending Clinician Unavailable

 

                    BRENNA JACOBS    Attending Clinician Unavailable

 

                    Brenna Jacobs    Attending Clinician [phone]

 

                    Abdi Schulz Attending Clinician [phone]

 

                    Gabriela Rosenthal Attending Clinician [phone]

 

                    EMILIA BONILLA        Attending Clinician Unavailable

 

                    DR GOPAL ADEN    Attending Clinician Unavailable

 

                    Yousif Rhodes Attending Clinician [phone]

 

                    Randy Cunningham   Attending Clinician [phone]

 

                    Yoan Lei     Attending Clinician [phone]

 

                    Abbi Somers  Attending Clinician [phone]

 

                    Vignesh See Attending Clinician [phone]

 

                    Charisse Green  Admitting Clinician Unavailable

 

                    LUMA BECKHAM      Admitting Clinician Unavailable

 

                    ZHEN JENKINS     Admitting Clinician Unavailable

 

                    MARTINA DOUGLASS     Admitting Clinician Unavailable

 

                    Anthony Elkins     Admitting Clinician Unavailable

 

                    Koko         Admitting Clinician Unavailable

 

                    AKI DUPREE         Admitting Clinician Unavailable

 

                    CHRISTINE SAINI       Admitting Clinician Unavailable

 

                    MD ROSALIO ANGULO     Admitting Clinician Unavailable

 

                    MELISSA PICKETT     Admitting Clinician Unavailable

 

                    DEBBY_EVELIN_Violet      Admitting Clinician Unavailable

 

                    MD MARTHA LAMAR Admitting Clinician Unavailable

 

                    MELVIN MANZO   Admitting Clinician Unavailable

 

                    MD MELVIN MANZO Admitting Clinician Unavailable

 

                    DAYLIN LITTLE      Admitting Clinician Unavailable

 

                    MD KAUSHIK BECKHAM   Admitting Clinician Unavailable

 

                    MD MELISSA PICKETT Admitting Clinician Unavailable

 

                    DR GOPAL ADEN    Admitting Clinician Unavailable









Payers







           Payer Name Policy Type Policy Number Effective Date Expiration Date S

ource [place]:            [id number] 2021            



           [employer]                       00:00:00              







Problems







       Condition Condition Condition Status Onset  Resolution Last   Treating Co

mments 

Source



       Name   Details Category        Date   Date   Treatment Clinician        



                                                 Date                 

 

       Hypotensio Hypotensio Disease Active 2022                             [facility]

[facility]



       n      n                    [facility]                               [facility]



                                   [facility]



                                   [facility]                                 [facility]

 

       Chest pain Chest pain Disease Active 2022                             M

ethodi



       with high with high               105                               st



       risk of risk of               [facility]:00:                             [facility]



       acute  acute                00                                 l



       coronary coronary                                                  



       syndrome syndrome                                                  

 

       Hyperkalem Hyperkalem Disease Active                              M

ethodi



       ia     ia                   



                                   00:00:                             Hospita



                                   00                                 l

 

       Fluid  Fluid  Disease Active                              Methodi



       overload overload               



                                   00:00:                             [facility]ospita



                                   00                                 l

 

       COVID-19 COVID-19 Disease Active                              [facility]

di



       virus  virus                



       detected detected               00:00:                             Hospit

a



                                   00                                 l

 

       Acute back Acute back Disease Active                              M

ethodi



       pain   pain                                                st



                                   00:00:                             [facility]ospita



                                   00                                 l

 

       Sepsis Sepsis Disease Active                              Methodi



                                   



                                   00:00:                             [facility]ospita



                                   00                                 l

 

       ARF (acute ARF (acute Disease Active                              M

ethodi



       renal  renal                



       failure) failure)               00:00:                             Hospit

a



                                   00                                 l

 

       Hypoxemia Hypoxemia Disease Active                              Met

hodi



                                   



                                   00:00:                             Hospita



                                   00                                 l

 

       Pulmonary Pulmonary Disease Active                              Met

hodi



       edema  edema                



                                   00:00:                             Hospita



                                   00                                 l

 

       UTI    UTI    Disease Active                              Methodi



       (urinary (urinary               



       tract  tract                00:00:                             Hospita



       infection) infection)               00                                 l

 

       Shortness Shortness Disease Active                              Met

hodi



       of breath of breath               



                                   00:00:                             Hospita



                                   00                                 l

 

       Venous Venous Disease Active                              Methodi



       stasis stasis               



       ulcer of ulcer of               00:00:                             Hospit

a



       lower  lower                00                                 l



       extremity extremity                                                  

 

       Weight Weight Disease Active                              Methodi



       gain   gain                 



                                   00:00:                             [facility]ospita



                                   00                                 l

 

       Pain in Pain in Disease Active                              Methodi



       wrist  wrist                



                                   00:00:                             [facility]ospita



                                   00                                 l

 

       Prerenal Prerenal Disease Active                              [facility]

di



       azotemia azotemia               



                                   00:00:                             [facility]ospita



                                   00                                 l

 

       Proteinuri Proteinuri Disease Active                              M

ethodi



       a      a                    



                                   00:00:                             [facility]ospita



                                   00                                 l

 

       Peripheral Peripheral Disease Active                              M

ethodi



       venous venous               



       insufficie insufficie               00:00:                             Ho

spita



       ncy    ncy                  00                                 l

 

       Malignant Malignant Disease Active                              Met

hodi



       neoplasm neoplasm               



       of skin of of skin of               00:00:                             Ho

spita



       upper  upper                00                                 l



       extremity extremity                                                  

 

       Migraine Migraine Disease Active                              [facility]

di



       headache headache               



                                   00:00:                             Hospita



                                   00                                 l

 

       Diabetes Diabetes Disease Active                              [facility]

di



       mellitus mellitus               



                                   00:00:                             [facility]ospita



                                   00                                 l

 

       Dysuria Dysuria Disease Active                              Methodi



                                   



                                   00:00:                             Hospita



                                   00                                 l

 

       Edema  Edema  Disease Active                              Methodi



                                   



                                   00:00:                             Hospita



                                   00                                 l

 

       Hyperlipid Hyperlipid Disease Active                       Overview

: Methodi



       emia   emia                                         Formattin st



                                   00:00:                      g of this Hospita



                                   00                          note   l



                                                               might be 



                                                               different 



                                                               from the 



                                                               original. 



                                                               Data   



                                                               migrated 



                                                               from [employer] 



                                                               [employer] 



                                                               y on   



                                                               5/30/15.D 



                                                               nancie    



                                                               migrated 



                                                               from [employer] 



                                                               [employer] 



                                                               y on   



                                                               5/30/15.D 



                                                               nancie    



                                                               migrated 



                                                               from [employer] 



                                                               [employer] 



                                                               y on   



                                                               5/30/15.D 



                                                               nancie    



                                                               migrated 



                                                               from [employer] 



                                                               [employer] 



                                                               y on   



                                                               5/30/15.D 



                                                               nancie    



                                                               migrated 



                                                               from [employer] 



                                                               [employer] 



                                                               y on   



                                                               5/30/15. 

 

       Hyperparat Hyperparat Disease Active                              M

ethodi



       hyroidism hyroidism               



                                   00:00:                             Hospita



                                   00                                 l

 

       Hyperurice Hyperurice Disease Active                       Overview

: Methodi



       keegan    keegan                                          Formattin st



                                   00:00:                      g of this Hospita



                                   00                          note   l



                                                               might be 



                                                               different 



                                                               from the 



                                                               original. 



                                                               Data   



                                                               migrated 



                                                               from [employer] 



                                                               [employer] 



                                                               y on   



                                                               5/30/15.D 



                                                               nancie    



                                                               migrated 



                                                               from [employer] 



                                                               [employer] 



                                                               y on   



                                                               5/30/15.D 



                                                               nancie    



                                                               migrated 



                                                               from [employer] 



                                                               [employer] 



                                                               y on   



                                                               5/30/15.D 



                                                               nancie    



                                                               migrated 



                                                               from [employer] 



                                                               [employer] 



                                                               y on   



                                                               5/30/15.D 



                                                               nancie    



                                                               migrated 



                                                               from [employer] 



                                                               [employer] 



                                                               y on   



                                                               5/30/15. 

 

       Abnormal Abnormal Disease Active                              [facility]

di



       urinalysis urinalysis               



                                   00:00:                             Hospita



                                   00                                 l

 

       Abnormal Abnormal Disease Active                              [facility]

di



       gait   gait                 9-23                               st



                                   00:00:                             Hospita



                                   00                                 l

 

       Ankle  Ankle  Disease Active                              Methodi



       swelling swelling                                              st



                                   00:00:                             [facility]ospita



                                   00                                 l

 

       Atrial Atrial Disease Active                              Methodi



       fibrillati fibrillati                                              st



       on     on                   00:00:                             Hospita



                                   00                                 l

 

       Chronic Chronic Disease Active                              Methodi



       venous venous                                              st



       stasis stasis               00:00:                             Hospita



                                   00                                 l

 

       Abdominal Abdominal Disease Active                              Met

hodi



       pain   pain                                                st



                                   00:00:                             [facility]ospita



                                   00                                 l

 

       CHF    CHF    Disease Active                              Methodi



       (congestiv (congestiv                                              st



       e heart e heart               00:00:                             Hospita



       failure) failure)               00                                 l

 

       Flank pain Flank pain Disease Active                              M

ethodi



                                   6                               st



                                   00:00:                             Hospita



                                   00                                 l

 

       Renal  Renal  Disease Active                       Overview: Method

i



       stone  stone                [address]                        [address]



                                   00:00:                      g of this Hospita



                                   00                          note   l



                                                               might be 



                                                               different 



                                                               from the 



                                                               original. 



                                                               Added  



                                                               automatic 



                                                               ally from 



                                                               request 



                                                               for    



                                                               surgery 



                                                               [id number] 

 

       SOLO     SOLO   Diagnosis Active 2021               [facility]

[facility]



              Active                         12:03:00               l



              2021               00:00:                             [place]

n



              [facility]               00                                 



              [place]                                                    

 

       Symptomati Symptomati Disease Active                       Overview

: Methodi



       c anemia c anemia               [address]



                                   00:00:                      g of this [facility]ospita



                                   00                          note   l



                                                               might be 



                                                               different 



                                                               from the 



                                                               original. 



                                                               Added  



                                                               automatic 



                                                               ally from 



                                                               request 



                                                               for    



                                                               surgery 



                                                               [id number] 

 

       Acute  Acute  Disease Active                              Methodi



       gallstone gallstone               3-12                               st



       pancreatit pancreatit               00:00:                             Ho

spita



       is     is                   00                                 l

 

       Hypervolem Hypervolem Disease Active                              M

ethodi



       ia     ia                   2                               st



                                   00:00:                             Hospita



                                   00                                 l

 

       Hypotensio Hypotensio Disease Active 2020                             M

ethodi



       n      n                    2-21                               st



                                   00:00:                             [facility]ospita



                                   00                                 l

 

       Respirator Respirator Disease Active 2020                             M

ethodi



       y failure y failure               1-16                               st



                                   00:00:                             [facility]ospita



                                   00                                 l

 

       Acute  Acute  Disease Active 2020                             Methodi



       cystitis cystitis               1-13                               st



       without without               00:00:                             [facility]osp[facility]



       hematuria hematuria               00                                 l

 

       Class 3 Class 3 Disease Active 2020                             Methodi



       severe severe               0-26                               st



       obesity obesity               00:00:                             [facility]osp[facility]



       without without               00                                 l



       serious serious                                                  



       comorbidit comorbidit                                                  



       y with y with                                                  



       body mass body mass                                                  



       index  index                                                   



       (BMI) of (BMI) of                                                  



       60.0 to 60.0 to                                                  



       69.9 in 69.9 in                                                  



       adult  adult                                                   

 

       Acute  Acute  Disease Active 2020                             Methodi



       renal  renal                0-26                               st



       failure failure               00:00:                             [facility]ospita



       (ARF)  (ARF)                00                                 l

 

       Acute  Acute  Disease Active 2020                             Methodi



       respirator respirator               0-26                               st



       y failure y failure               00:00:                             Hosp

mimi



       with   with                 00                                 l



       hypoxia hypoxia                                                  



       and    and                                                     



       hypercapni hypercapni                                                  



       a      a                                                       

 

       Acute  Acute  Disease Active 2020                             Methodi



       congestive congestive               0-24                               st



       heart  heart                00:00:                             [facility]osp[facility]



       failure failure               00                                 l

 

       Left foot Left foot Disease Active 2019                             Met

hodi



       infection infection               0-15                               st



                                   00:00:                             Hospita



                                   00                                 l

 

       Cellulitis Cellulitis Disease Active                              M

ethodi



       of left of left               915                               st



       leg    leg                  00:00:                             [facility]ospita



                                   00                                 l

 

       Bacteremia Bacteremia Disease Active                              M

ethodi



       due to due to                                              st



       Pseudomona Pseudomona               00:00:                             Ho

spita



       s      s                    00                                 l

 

       COLON    COLON Diagnosis Active 2017               Me

[facility]ia



       CANCER CANCER                         05:49:00               l



       SCREENING- SCREENING-               00:00:                             He

rmann



       Z12.11 Z12.11               00                                 



              Active                                                  



              2017                                                  



                [place]                                                  



              [place]                                                    

 

       R92.1 -  R92.1 - Diagnosis Active 2016               

[place]



       MAMMOGRAPH MAMMOGRAPH                         15:55:00               

l



       IC     IC                   00:01:                             [facility]



       CALCIFCN CALCIFCN               00                                 



       FOUND ON FOUND ON                                                  



              Active                                                  



              2016                                                  



               [facility]                                                  



              [place]                                                  

 

       Z12.31 -  Z12.31 - Diagnosis Active 2016             

  [place]



       ENCNTR ENCNTR                         07:08:00               l



       SCREEN SCREEN               00:01:                             [place]



       MAMMOGRAM MAMMOGRAM               00                                 



       FOR MA FOR MA                                                  



              Active                                                  



              2016                                                  



               [facility]                                                  



              [place]                                                  

 

       RT WRIST  RT WRIST Diagnosis Active 2017             

  [place]



       DISTAL DISTAL               1          02:03:00               l



       RADIUS RADIUS               09:00:                             [place]



       CLSD FX CLSD FX               00                                 



              Active                                                  



              2016                                                  



              [facility]                                                     



              [place]                                                  



              [facility]                                                  



              [facility]                                                   

 

       Abnormal Abnormal Disease Active                       Overview: Me

thodi



       glucose glucose               7-02                        Formattin st



       level  level                00:00:                      g of this Hospita



                                   00                          note   l



                                                               might be 



                                                               different 



                                                               from the 



                                                               original. 



                                                               Data   



                                                               migrated 



                                                               from [facility] 



                                                               [facility] 



                                                               y on   



                                                               7/8/15.Da 



                                                               ta     



                                                               migrated 



                                                               from [facility] 



                                                               [facility] 



                                                               y on   



                                                               7/8/15.Da 



                                                               ta     



                                                               migrated 



                                                               from [facility] 



                                                               [facility] 



                                                               y on   



                                                               7/8/15. 

 

       Obstructiv Obstructiv Disease Active                       Overview

: Methodi



       e sleep e sleep               2-04                        Formattin st



       apnea  apnea                00:00:                      g of this Hospita



       syndrome syndrome               00                          note   l



                                                               might be 



                                                               different 



                                                               from the 



                                                               original. 



                                                               Data   



                                                               migrated 



                                                               from [facility] 



                                                               [facility] 



                                                               y on   



                                                               5/30/15.D 



                                                               nancie    



                                                               migrated 



                                                               from [facility] 



                                                               [facility] 



                                                               y on   



                                                               5/30/15.D 



                                                               nancie    



                                                               migrated 



                                                               from [facility] 



                                                               [facility] 



                                                               y on   



                                                               5/30/15.D 



                                                               nancie    



                                                               migrated 



                                                               from [facility] 



                                                               [facility] 



                                                               y on   



                                                               5/30/15.D 



                                                               nancie    



                                                               migrated 



                                                               from [facility] 



                                                               [facility] 



                                                               y on   



                                                               5/30/15.D 



                                                               nancie    



                                                               migrated 



                                                               from [facility] 



                                                               [facility] 



                                                               y on   



                                                               5/30/15. 

 

       Lymphedema Lymphedema Disease Active                       Overview

: Methodi



                                   2-04                        Formatt[address]



                                   00:00:                      g of this Hospita



                                   00                          note   l



                                                               might be 



                                                               different 



                                                               from the 



                                                               original. 



                                                               Data   



                                                               migrated 



                                                               from [facility] 



                                                               [facility] 



                                                               y on   



                                                               5/30/15. 

 

       Depressive Depressive Disease Active                       Overview

: Methodi



       disorder disorder               4-24                        Formatt[address]



                                   00:00:                      g of this Hospita



                                   00                          note   l



                                                               might be 



                                                               different 



                                                               from the 



                                                               original. 



                                                               Data   



                                                               migrated 



                                                               from [facility] 



                                                               [facility] 



                                                               y on   



                                                               5/30/15.D 



                                                               nancie    



                                                               migrated 



                                                               from [facility] 



                                                               [facility] 



                                                               y on   



                                                               5/30/15.D 



                                                               nancie    



                                                               migrated 



                                                               from [facility] 



                                                               [facility] 



                                                               y on   



                                                               5/30/15.D 



                                                               nancie    



                                                               migrated 



                                                               from [facility] 



                                                               [facility] 



                                                               y on   



                                                               5/30/15. 

 

       Hypothyroi Hypothyroi Disease Active                       Overview

: Methodi



       dism   dism                 4-24                        Formatt[address]



                                   00:00:                      g of this Hospita



                                   00                          note   l



                                                               might be 



                                                               different 



                                                               from the 



                                                               original. 



                                                               Data   



                                                               migrated 



                                                               from [facility] 



                                                               [facility] 



                                                               y on   



                                                               5/30/15. 

 

       Obesity  Obesity Problem Active 2016               Me

moria



       (disorder) (disorder)                         00:23:22               

l



              Active               00:00:                             [facility]



              2012               00                                 



              Problem                                                  



              2016                                                  



              Data                                                    



              migrated                                                  



              from [facility]                                                  



              [facility]                                                  



              on                                                      



              5/30/15.                                                  



              [facility]                                                  



              [facility]                                                  



              [facility] [place]                                                  



              [place]                                                     



              [place]                                                  



              [place],[facility]                                                  



              Bone &                                                  



              Joint                                                   

 

       Cobalamin Cobalamin Disease Active                       Overview: 

Methodi



       deficiency deficiency               -                        Formattin

 st



                                   00:00:                      g of this Hospita



                                   00                          note   l



                                                               might be 



                                                               different 



                                                               from the 



                                                               original. 



                                                               Data   



                                                               migrated 



                                                               from [facility] 



                                                               [facility] 



                                                               y on   



                                                               5/30/15.D 



                                                               nancie    



                                                               migrated 



                                                               from [facility] 



                                                               [facility] 



                                                               y on   



                                                               5/30/15.D 



                                                               nancie    



                                                               migrated 



                                                               from [facility] 



                                                               [facility] 



                                                               y on   



                                                               5/30/15.D 



                                                               nancie    



                                                               migrated 



                                                               from [facility] 



                                                               [facility] 



                                                               y on   



                                                               5/30/15. 

 

       Hypertensi  Hypertens Problem Active 2016            

   [facility]



       ve episode kyle                  7-10          00:23:22               l



       (disorder) episode               00:00:                             [place]

nn



              (disorder)               00                                 



              Active                                                  



              07/10/2012                                                  



              Problem                                                  



              2016                                                  



              Data                                                    



              migrated                                                  



              from [employer]                                                  



              [employer]                                                  



              on                                                      



              5/30/15.                                                  



               ILA Zarate,                                                  



              [facility] [place]                                                  



              Land,                                                  



              [facility]                                                     



              [place]                                                  



              [facility]                                                  



              [place]                                                  



              Bone &                                                  



              Joint                                                   

 

       Calcificat        Problem Resolve               2022               

[facility]



       ion of Calcificat        d                    03:11:55               l



       breast ion of                                                  [place]



       (finding) breast                                                  



              (finding)                                                  



              Resolved                                                  



              Problem                                                  



              2022                                                  



              Left                                                    



              Medical                                                  



              Group,                                                  



              ILA Zarate,                                                  



              [facility] [place]                                                  



              Land,                                                  



              [facility]                                                     



              [place]                                                  



              Trace,[facility]                                                  



              [place]                                                  



              Bone &                                                  



              Joint,                                                  



              [place]                                                  

 

       Acute   Acute Problem Active               2020               [facility]

ia



       urinary urinary                             22:36:35               l



       tract  tract                                                   [place]



       infection infection                                                  



       (disorder) (disorder)                                                  



              Active                                                  



              Problem                                                  



              2020                                                  



               Medical                                                  



              Group                                                   

 

       Chronic  Chronic Problem Active               2020               Me

moria



       renal  renal                              22:36:35               l



       impairment impairment                                                  He

rmann



       (disorder) (disorder)                                                  



              Active                                                  



              Problem                                                  



              2020                                                  



                                                                    



              Medical                                                  



              Group,                                                  



              ILA Zarate,                                                  



              [facility] [place]                                                  



              Land,                                                  



              [facility]                                                     



              [place]                                                  



              [facility]                                                  



              [place]                                                  



              Bone &                                                  



              Joint,                                                  



              [place]                                                  

 

       Benign  Benign Problem Active               2022               [place]

milan



       essential essential                             03:11:55               l



       hypertensi hypertensi                                                  He

rmann



       on     on                                                      



       (disorder) (disorder)                                                  



              Active                                                  



              Problem                                                  



              2022                                                  



               Medical                                                  



              Group,                                                  



              ILA Zarate,                                                  



              [facility] [place]                                                  



              Land,                                                  



              [facility]                                                     



              [place]                                                  



              [facility]                                                  



              [place]                                                  



              Bone &                                                  



              Joint,                                                  



              [place]                                                  

 

       Cardiorena  Cardioren Problem Active               2022            

   [facility]



       l syndrome al                                 03:11:55               l



       (disorder) syndrome                                                  [place]

daryl



              (disorder)                                                  



               Active                                                  



              Problem                                                  



              2022                                                  



               Medical                                                  



              Group,                                                  



              [facility] [place]                                                  



              Land,                                                  



              [place]                                                  

 

       Chronic  Chronic Problem Active               2022               Me

moria



       kidney kidney                             03:11:55               l



       disease disease                                                  [place]



       (disorder) (disorder)                                                  



               Active                                                  



              Problem                                                  



              2022                                                  



               Medical                                                  



              Group,                                                  



              [facility] [place]                                                  



              Land,                                                  



              [place]                                                  

 

       Chronic  Chronic Problem Active               2022               Me

moria



       kidney kidney                             03:11:55               l



       disease disease                                                  [place]



       stage 3 stage 3                                                  



       (disorder) (disorder)                                                  



               Active                                                  



              Problem                                                  



              2022                                                  



               Medical                                                  



              Group,                                                  



              [facility]                                                  



              Land,                                                  



              [place]                                                  

 

       Chronic  Chronic Problem Active               2022               Me

moria



       pain   pain                               03:11:55               l



       (finding) (finding)                                                  [place]

daryl



              Active                                                  



              Problem                                                  



              2022                                                  



               Medical                                                  



              Group,                                                  



              [facility]                                                  



              Land,                                                  



              [place]                                                  



                                                                      

 

       Dependence  Dependenc Problem Active               2022            

   [facility]



       on     e on                               03:11:55               l



       supplement supplement                                                  Neel deleon



       al oxygen al oxygen                                                  



       (finding) (finding)                                                  



              Active                                                  



              Problem                                                  



              2022                                                  



               Medical                                                  



              Group,                                                  



              [place]                                                  

 

       Edema of  Edema of Problem Active               2022               

[facility]



       lower  lower                              03:11:55               l



       extremity extremity                                                  [place]

daryl



       (finding) (finding)                                                  



              Active                                                  



              Problem                                                  



              2022                                                  



               Medical                                                  



              Group,                                                  



              [facility]                                                  



              Land,                                                  



              [place]                                                  

 

       Hypertensi  Hypertens Problem Active               2022            

   [facility]



       ve     kyle                                03:11:55               l



       disorder, disorder,                                                  [place]

daryl



       systemic systemic                                                  



       arterial arterial                                                  



       (disorder) (disorder)                                                  



              Active                                                  



              Problem                                                  



              2022                                                  



               Medical                                                  



              Group,[facility]                                                    



              [facility]                                                  



              [facility]                                                  



              [facility] [place]                                                  



              Land,                                                  



              [facility]                                                  



              Land,                                                  



              [place]                                                  

 

       Hypertensi  Hypertens Problem Active               2022            

   [facility]



       ve renal kyle renal                             03:11:55               l



       disease disease                                                  [place]



       (disorder) (disorder)                                                  



              Active                                                  



              Problem                                                  



              2022                                                  



               Medical                                                  



              Group,                                                  



              [facility]                                                  



              Land,                                                  



              [place]                                                  

 

       Lymphedema  Lymphedem Problem Active               2022            

   [facility]



       of lower a of lower                             03:11:55               l



       extremity extremity                                                  [place]

daryl



       (disorder) (disorder)                                                  



              Active                                                  



              Problem                                                  



              2022                                                  



               Medical                                                  



              Group,                                                  



              [facility]                                                  



              Land,                                                  



              [place]                                                  

 

       Morbid  Morbid Problem Active               2022               [place]

milan



       obesity obesity                             03:11:55               l



       (disorder) (disorder)                                                  He

rmdaryl



               Active                                                  



              Problem                                                  



              2022                                                  



               Medical                                                  



              Group,                                                  



              [facility]                                                    



              [facility],                                                  



              [facility] [place]                                                  



              Land,[facility]                                                     



              [place]                                                  



              [facility]                                                  



              [place]                                                  



              Bone &                                                  



              Joint,                                                  



              [place]                                                  

 

       Post-disch        Problem Active               2022               SUSIE gallo   Post-disch                             03:11:55               l



       follow-up argpablito                                                    [place]



       (finding) follow-up                                                  



              (finding)                                                  



              Active                                                  



              Problem                                                  



              2022                                                  



                                                                    



              Medical                                                  



              Group,                                                  



              [place]                                                  

 

       Stasis  Stasis Problem Active               2022               [place]

milan



       ulcer  ulcer                              03:11:55               l



       (disorder) (disorder)                                                  He

rmann



               Active                                                  



              Problem                                                  



              2022                                                  



               Medical                                                  



              Group,                                                  



              [facility]                                                  



              Land,                                                  



              [place]                                                  

 

       Swelling -  Swelling Problem Active               2022             

  [facility]



       edema - - edema -                             03:11:55               l



       symptom symptom                                                  [place]



       (finding) (finding)                                                  



              Active                                                  



              Problem                                                  



              2022                                                  



               Medical                                                  



              Group,                                                  



              [facility] [place]                                                  



              Land,                                                  



              [place]                                                  

 

       Kidney  Kidney Problem Active               2017               [place]

milan



       disease disease                             03:07:28               l



       (disorder) (disorder)                                                  He

rmann



              Active                                                  



              Problem                                                  



              2017                                                  



               [place]                                                  



              Land                                                    

 

       RIGHT   RIGHT Diagnosis Active               2016               Mem

oria



       WRIST  WRIST                              15:06:00               l



       DISTAL DISTAL                                                  [place]



       RADIUS RADIUS                                                  



       CLSD FX CLSD FX                                                  



              Active                                                  



              [facility]                                                     



              [place]                                                  



              [facility]one &                                                  



              Joint                                                   

 

       DISTAL  DISTAL Diagnosis Active               2016               Me

moria



       RADIUS RADIUS                             09:46:00               l



       CLSD FX CLSD FX                                                  [place]



              Active  [facility]                                                     



              [place]                                                  



              Trace                                                   

 

       Long-term  Long-term Problem Active               2022             

  [facility]



       current current                             03:11:55               l



       use of use of                                                  [place]



       drug   drug                                                    



       therapy therapy                                                  



       (situation (situation                                                  



       )      ) Active                                                  



              Problem                                                  



              2022                                                  



                                                                    



              Medical                                                  



              Group                                                   

 

       Cholestero  Cholester Problem                      2016            

   [facility]



       l      ol                                 05:02:18               l



       (substance (substance                                                  He

rmann



       )      )  Problem                                                  



              2016                                                  



              Surgical                                                  



              [facility]                                                    

 

       Sleep   Sleep Problem                      2016               [facility]

ia



       apnea  apnea                              05:02:18               l



       (finding) (finding)                                                  [place]

daryl



              Problem                                                  



              2016                                                  



              <sup>2</matos                                                  



              p>does not                                                  



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                         

 

           Sex Assigned At 1955                       [facility]

[facility]



           [facility]      00:00:00   [facility]:00                         [facility]









                Smoking Status  Start Date      Stop Date       Source

 

                Social [facility]







Medications







       Ordered Filled Start  Stop   Current Ordering Indication Dosage Frequency

 Signature

                    Comments            Components          Source



     Medication Medication Date Date Medication? Clinician                (SIG) 

          



     Name Name                                                   

 

     brimonidine      2022      Yes            1[drp] Q.5D 1 drop 2           

[facility]



     (ALPHAGAN)      2-07                               (two)           [facility]



     0.15 %      13:19:                               times           [facility]



     ophthalmic      20                                 daily.           [facility]



     solution                                                        

 

     apixaban      2022      Yes            2.5mg Q.5D Take 2.5           [facility]



     (ELIQUIS)      2-07                               mg by           [facility]



     2.5 mg Tab      13:19:                               mouth 2           [facility]

[facility]



     tablet      20                                 (two)           [facility]



                                                  times           



                                                  daily.           

 

     bumetanide      2022      Yes            2mg  QD   Take 2 mg           [facility]



     (BUMEX) 2      2-07                               by mouth           [facility]



     MG tablet      13:19:                               daily.           [facility]

[facility]



               20                                                [facility]

 

     pantoprazol      2022      Yes            40mg QD   Take 40 mg           

[facility]



     e         2-07                               by mouth           [facility]



     (PROTONIX)      13:19:                               daily.           [facility]

[facility]



     40 MG      20                                                Center



     tablet                                                        

 

     brimonidine      2022      Yes            1[drp] Q.5D 1 drop 2           

[facility]



     (ALPHAGAN)      2-07                               (two)           [facility]



     0.15 %      13:19:                               times           [facility]



     ophthalmic      20                                 daily.           [facility]



     solution                                                        

 

     apixaban      2022      Yes            2.5mg Q.5D Take 2.5           [facility]



     (ELIQUIS)      2-07                               mg by           [facility]



     2.5 mg Tab      13:19:                               mouth 2           [facility]

[facility]



     tablet      20                                 (two)           [facility]



                                                  times           



                                                  daily.           

 

     bumetanide      2022      Yes            2mg  QD   Take 2 mg           [facility]



     (BUMEX) 2      2-07                               by mouth           [facility]



     MG tablet      13:19:                               daily.           [facility]

[facility]



               20                                                [facility]

 

     pantoprazol      2022      Yes            40mg QD   Take 40 mg           

[facility]



     e         2-07                               by mouth           [facility]



     (PROTONIX)      13:19:                               daily.           [facility]

al



     40 MG      20                                                Center



     tablet                                                        

 

     brimonidine      2022      Yes            1[drp] Q.5D 1 drop 2           

[facility]



     (ALPHAGAN)      2-07                               (two)           [facility]



     0.15 %      13:19:                               times           [facility]



     ophthalmic      20                                 daily.           Center



     solution                                                        

 

     apixaban      2022      Yes            2.5mg Q.5D Take 2.5           [facility]



     (ELIQUIS)      2-07                               mg by           [facility]



     2.5 mg Tab      13:19:                               mouth 2           [facility]

[facility]



     tablet      20                                 (two)           [facility]



                                                  times           



                                                  daily.           

 

     bumetanide      2022      Yes            2mg  QD   Take 2 mg           [facility]



     (BUMEX) 2      2-07                               by mouth           [facility]



     MG tablet      13:19:                               daily.           [facility]

[facility]



               20                                                Center

 

     pantoprazol      2022      Yes            40mg QD   Take 40 mg           

[facility]



     e         2-07                               by mouth           [facility]



     (PROTONIX)      13:19:                               daily.           [facility]

al



     40 MG      20                                                Center



     tablet                                                        

 

     brimonidine      2022      Yes            1[drp] Q.5D 1 drop 2           

[facility]



     (ALPHAGAN)      2-07                               (two)           [facility]



     0.15 %      13:19:                               times           [facility]



     ophthalmic      20                                 daily.           Center



     solution                                                        

 

     apixaban      2022      Yes            2.5mg Q.5D Take 2.5           [facility]



     (ELIQUIS)      2-07                               mg by           [facility]



     2.5 mg Tab      13:19:                               mouth 2           [facility]

[facility]



     tablet      20                                 (two)           Center



                                                  times           



                                                  daily.           

 

     bumetanide      2022      Yes            2mg  QD   Take 2 mg           [facility]



     (BUMEX) 2      2-07                               by mouth           [facility]



     MG tablet      13:19:                               daily.           [facility]

[facility]



               20                                                Center

 

     pantoprazol      2022      Yes            40mg QD   Take 40 mg           

[facility]



     e         2-07                               by mouth           [facility]



     (PROTONIX)      13:19:                               daily.           [facility]

al



     40 MG      20                                                Center



     tablet                                                        

 

     brimonidine      2022      Yes            1[drp] Q.5D 1 drop 2           

[facility]



     (ALPHAGAN)      2-07                               (two)           [facility]



     0.15 %      13:19:                               times           [facility]



     ophthalmic      20                                 daily.           Center



     solution                                                        

 

     apixaban      2022      Yes            2.5mg Q.5D Take 2.5           [facility]



     (ELIQUIS)      2-07                               mg by           [facility]



     2.5 mg Tab      13:19:                               mouth 2           [facility]

[facility]



     tablet      20                                 (two)           Center



                                                  times           



                                                  daily.           

 

     bumetanide      2022      Yes            2mg  QD   Take 2 mg           [facility]



     (BUMEX) 2      2-07                               by mouth           [facility]



     MG tablet      13:19:                               daily.           [facility]

[facility]



               20                                                Center

 

     pantoprazol      2022      Yes            40mg QD   Take 40 mg           

[facility]



     e         2-07                               by mouth           [facility]



     (PROTONIX)      13:19:                               daily.           [facility]

al



     40 MG      20                                                Center



     tablet                                                        

 

     brimonidine      2022      Yes            1[drp] Q.5D 1 drop 2           

[facility]



     (ALPHAGAN)      2-07                               (two)           [facility]



     0.15 %      13:19:                               times           [facility]



     ophthalmic      20                                 daily.           Center



     solution                                                        

 

     apixaban      2022      Yes            2.5mg Q.5D Take 2.5           [facility]



     (ELIQUIS)      2-07                               mg by           [facility]



     2.5 mg Tab      13:19:                               mouth 2           [facility]

[facility]



     tablet      20                                 (two)           Center



                                                  times           



                                                  daily.           

 

     bumetanide      2022      Yes            2mg  QD   Take 2 mg           [facility]



     (BUMEX) 2      2-07                               by mouth           [facility]



     MG tablet      13:19:                               daily.           [facility]

[facility]



               20                                                Center

 

     pantoprazol      2022      Yes            40mg QD   Take 40 mg           

[facility]



     e         2-07                               by mouth           [facility]



     (PROTONIX)      13:19:                               daily.           [facility]

[facility]



     40 MG      20                                                Center



     tablet                                                        

 

     midodrine      2022- Yes            5mg       Take 1           [facility]



     (PROAMATINE      2-07 02-05                          tablet (5           [facility]

[facility]



     ) 5 MG      00:00: 23:59                          mg total)           [facility]

[facility]



     tablet      00   :00                           by mouth 3           [facility]



                                                  (three)           



                                                  times           



                                                  daily           



                                                  before           



                                                  meals for           



                                                  60 days.           

 

     midodrine      2022- Yes            5mg       Take 1           [facility]



     (PROAMATINE      2-07 02-05                          tablet (5           [facility]

[facility]



     ) 5 MG      00:00: 23:59                          mg total)           [facility]

[facility]



     tablet      00   :00                           by mouth 3           [facility]



                                                  (three)           



                                                  times           



                                                  daily           



                                                  before           



                                                  meals for           



                                                  60 days.           

 

     midodrine      2022- Yes            5mg       Take 1           [facility]



     (PROAMATINE      2-07 02-05                          tablet (5           [facility]

[facility]



     ) 5 MG      00:00: 23:59                          mg total)           [facility]

[facility]



     tablet      00   :00                           by mouth 3           [facility]



                                                  (three)           



                                                  times           



                                                  daily           



                                                  before           



                                                  meals for           



                                                  60 days.           

 

     midodrine      2022- Yes            5mg       Take 1           [facility]



     (PROAMATINE      2-07 02-05                          tablet (5           [facility]

[facility]



     ) 5 MG      00:00: 23:59                          mg total)           [facility]

[facility]



     tablet      00   :00                           by mouth 3           [facility]



                                                  (three)           



                                                  times           



                                                  daily           



                                                  before           



                                                  meals for           



                                                  60 days.           

 

     midodrine      2022- Yes            5mg       Take 1           [facility]



     (PROAMATINE      2-07 02-05                          tablet (5           [facility]

[facility]



     ) 5 MG      00:00: 23:59                          mg total)           [facility]

[facility]



     tablet      00   :00                           by mouth 3           [facility]



                                                  (three)           



                                                  times           



                                                  daily           



                                                  before           



                                                  meals for           



                                                  60 days.           

 

     midodrine      2022- Yes            5mg       Take 1           [facility]



     (PROAMATINE      2-07 02-05                          tablet (5           [facility]

[facility]



     ) 5 MG      00:00: 23:59                          mg total)           [facility]

[facility]



     tablet      00   :00                           by mouth 3           [facility]



                                                  (three)           



                                                  times           



                                                  daily           



                                                  before           



                                                  meals for           



                                                  60 days.           

 

     midodrine      -2022- No             10mg [id number] Take 10 mg      

     [facility]



     (PROAMATINE      2--                     [id number] by mouth 3      

     [facility]



     ) 10 MG      19:03: 00:00                     3D   (three)           [facility]

[facility]



     tablet      43   :00                           times           Center



                                                  daily.           

 

     digoxin      -2022- No             125ug      Take 125           [facility]



     (LANOXIN)      2- 12-06                          mcg by           [facility]



     0.125 MG      19:03: 00:00                          mouth 3           [facility]

[facility]



     tablet      43   :00                           times per           Center



                                                  week .           

 

     metoprolol      -2022- No             25mg Q.5D Take 25 mg           

[facility]



     tartrate      2-                          by mouth 2           [facility]



     (LOPRESSOR)      19:03: 00:00                          (two)           [facility]

[facility]



     25 MG      43   :00                           times           Center



     tablet                                         daily.           

 

     midodrine      -2022- No             10mg [id number] Take 10 mg      

     [facility]



     (PROAMATINE      2--                     [id number] by mouth 3      

     [facility]



     ) 10 MG      19:03: 00:00                     3D   (three)           [facility]

[facility]



     tablet      43   :00                           times           Center



                                                  daily.           

 

     digoxin      2022- No             125ug      Take 125           [facility]



     (LANOXIN)      2- 12-06                          mcg by           [facility]



     0.125 MG      19:03: 00:00                          mouth 3           [facility]

[facility]



     tablet      43   :00                           times per           Center



                                                  week .           

 

     metoprolol      -2022- No             25mg Q.5D Take 25 mg           

[facility]



     tartrate      2-                          by mouth 2           [facility]



     (LOPRESSOR)      19:03: 00:00                          (two)           [facility]

[facility]



     25 MG      43   :00                           times           Center



     tablet                                         daily.           

 

     midodrine      -2022- No             10mg [id number] Take 10 mg      

     [facility]



     (PROAMATINE      2--                     [id number] by mouth 3      

     [facility]



     ) 10 MG      19:03: 00:00                     3D   (three)           [facility]

[facility]



     tablet      43   :00                           times           Center



                                                  daily.           

 

     digoxin      -2022- No             125ug      Take 125           [facility]



     (LANOXIN)      2- 12-06                          mcg by           [facility]



     0.125 MG      19:03: 00:00                          mouth 3           [facility]

[facility]



     tablet      43   :00                           times per           Center



                                                  week .           

 

     metoprolol      2022- No             25mg Q.5D Take 25 mg           

[facility]



     tartrate      2--06                          by mouth 2           [facility]



     (LOPRESSOR)      19:03: 00:00                          (two)           [facility]

[facility]



     25 MG      43   :00                           times           Center



     tablet                                         daily.           

 

     midodrine      -2022- No             10mg [id number] Take 10 mg      

     [facility]



     (PROAMATINE      2--                     [id number] by mouth 3      

     [facility]



     ) 10 MG      19:03: 00:00                     3D   (three)           [facility]

[facility]



     tablet      43   :00                           times           Center



                                                  daily.           

 

     digoxin      2022- No             125ug      Take 125           [facility]



     (LANOXIN)      2- 12-06                          mcg by           [facility]



     0.125 MG      19:03: 00:00                          mouth 3           [facility]

[facility]



     tablet      43   :00                           times per           Center



                                                  week .           

 

     metoprolol      2022- No             25mg Q.5D Take 25 mg           

[facility]



     tartrate      2-                          by mouth 2           [facility]



     (LOPRESSOR)      19:03: 00:00                          (two)           [facility]

[facility]



     25 MG      43   :00                           times           Center



     tablet                                         daily.           

 

     midodrine      2022- No             10mg Q.[id number] Take 10 mg      

     [facility]



     (PROAMATINE      -                     [id number] by mouth 3      

     [facility]



     ) 10 MG      19:03: 00:00                     3D   (three)           [facility]

[facility]



     tablet      43   :00                           times           Center



                                                  daily.           

 

     digoxin      2022- No             125ug      Take 125           [facility]



     (LANOXIN)      2- 12-06                          mcg by           [facility]



     0.125 MG      19:03: 00:00                          mouth 3           [facility]

[facility]



     tablet      43   :00                           times per           Center



                                                  week .           

 

     metoprolol      2022- No             25mg Q.5D Take 25 mg           

[facility]



     tartrate      2--                          by mouth 2           [facility]



     (LOPRESSOR)      19:03: 00:00                          (two)           [facility]

[facility]



     25 MG      43   :00                           times           Center



     tablet                                         daily.           

 

     digoxin      2022- No             125ug      Take 125           [facility]



     (LANOXIN)      2- 12-06                          mcg by           [facility]



     0.125 MG      19:03: 00:00                          mouth 3           [facility]

[facility]



     tablet      43   :00                           times per           Center



                                                  week .           

 

     metoprolol      2022- No             25mg Q.5D Take 25 mg           

[facility]



     tartrate                                by mouth 2           [facility]



     (LOPRESSOR)      19:03: 00:00                          (two)           [facility]

[facility]



     25 MG      43   :00                           times           Center



     tablet                                         daily.           

 

     midodrine      2022 No             10mg Q.[id number] Take 10 mg      

     [facility]



     (PROAMATINE                           [id number] by mouth 3      

     [facility]



     ) 10 MG      19:03: 00:00                     3D   (three)           [facility]

[facility]



     tablet      43   :00                           times           Center



                                                  daily.           

 

     mINOCYCLine      2022      Yes            100mg      Take 1           [facility]



     (MINOCIN,DY      2-06                               capsule           [facility]



     NACIN) 100      00:00:                               (100 mg           [facility]

[facility]



     MG capsule      00                                 total) by           [facility]

[facility]



                                                  mouth           



                                                  every 12           



                                                  (twelve)           



                                                  hours.           

 

     mINOCYCLine      2022      Yes            100mg      Take 1           [facility]



     (MINOCIN,DY      2-06                               capsule           [facility]



     NACIN) 100      00:00:                               (100 mg           [facility]

[facility]



     MG capsule      00                                 total) by           [facility]

[facility]



                                                  mouth           



                                                  every 12           



                                                  (twelve)           



                                                  hours.           

 

     mINOCYCLine      2022      Yes            100mg      Take 1           [facility]



     (MINOCIN,DY      2-06                               capsule           [facility]



     NACIN) 100      00:00:                               (100 mg           [facility]

[facility]



     MG capsule      00                                 total) by           [facility]

[facility]



                                                  mouth           



                                                  every 12           



                                                  (twelve)           



                                                  hours.           

 

     mINOCYCLine      2022      Yes            100mg      Take 1           [facility]



     (MINOCIN,DY      2-06                               capsule           [facility]



     NACIN) 100      00:00:                               (100 mg           [facility]

[facility]



     MG capsule      00                                 total) by           Cent

er



                                                  mouth           



                                                  every 12           



                                                  (twelve)           



                                                  hours.           

 

     mINOCYCLine      2022      Yes            100mg      Take 1           [facility]

 [facility]



     (MINOCIN,DY      2-06                               capsule           [facility]



     NACIN) 100      00:00:                               (100 mg           [facility]

[facility]



     MG capsule      00                                 total) by           Cent

er



                                                  mouth           



                                                  every 12           



                                                  (twelve)           



                                                  hours.           

 

     mINOCYCLine      2022      Yes            100mg      Take 1           [facility]

 [facility]



     (MINOCIN,DY      2-06                               capsule           [facility]



     NACIN) 100      00:00:                               (100 mg           [facility]

[facility]



     MG capsule      00                                 total) by           Cent

er



                                                  mouth           



                                                  every 12           



                                                  (twelve)           



                                                  hours.           

 

     promethazin      2022 No             12.5mg      Take 1           [facility]

[facility]



     e                                   tablet           [facility]



     (PHENERGAN)      00:00: 23:59                          (12.5 mg           [facility]

[facility]



     12.5 MG      00   :00                           total) by           Center



     tablet                                         mouth           



                                                  every 6           



                                                  (six)           



                                                  hours as           



                                                  needed for           



                                                  Nausea for           



                                                  up to 15           



                                                  days.           

 

     promethazin      2022- No             12.5mg      Take 1           [facility]

[facility]



     e         2- 12-21                          tablet           [facility]



     (PHENERGAN)      00:00: 23:59                          (12.5 mg           [facility]

[facility]



     12.5 MG      00   :00                           total) by           Center



     tablet                                         mouth           



                                                  every 6           



                                                  (six)           



                                                  hours as           



                                                  needed for           



                                                  Nausea for           



                                                  up to 15           



                                                  days.           

 

     promethazin      2022- No             12.5mg      Take 1           [facility]

[facility]



     e         2- 12-21                          tablet           [facility]



     (PHENERGAN)      00:00: 23:59                          (12.5 mg           [facility]

[facility]



     12.5 MG      00   :00                           total) by           Center



     tablet                                         mouth           



                                                  every 6           



                                                  (six)           



                                                  hours as           



                                                  needed for           



                                                  Nausea for           



                                                  up to 15           



                                                  days.           

 

     promethazin      2022- No             12.5mg      Take 1           [facility]

[facility]



     e         2-                          tablet           [facility]



     (PHENERGAN)      00:00: 23:59                          (12.5 mg           [facility]

[facility]



     12.5 MG      00   :00                           total) by           Center



     tablet                                         mouth           



                                                  every 6           



                                                  (six)           



                                                  hours as           



                                                  needed for           



                                                  Nausea for           



                                                  up to 15           



                                                  days.           

 

     promethazin      2022- No             12.5mg      Take 1           [facility]

[facility]



     e         2-21                          tablet           [facility]



     (PHENERGAN)      00:00: 23:59                          (12.5 mg           [facility]

[facility]



     12.5 MG      00   :00                           total) by           Center



     tablet                                         mouth           



                                                  every 6           



                                                  (six)           



                                                  hours as           



                                                  needed for           



                                                  Nausea for           



                                                  up to 15           



                                                  days.           

 

     promethazin      2022- No             12.5mg      Take 1           [facility]

[facility]



     e         2--21                          tablet           [facility]



     (PHENERGAN)      00:00: 23:59                          (12.5 mg           [facility]

[facility]



     12.5 MG      00   :00                           total) by           Center



     tablet                                         mouth           



                                                  every 6           



                                                  (six)           



                                                  hours as           



                                                  needed for           



                                                  Nausea for           



                                                  up to 15           



                                                  days.           

 

     atorvastati      2022      Yes            10mg QD   Take 10 mg           

[facility]



     n (LIPITOR)      1-18                               by mouth           st



     10 mg      10:44:                               every           [facility]



     tablet      59                                 evening.           l

 

     sucroferric      2022      Yes            500mg Q.[id number] Chew 500      

     [facility]



     oxyhydroxid      1-18                          [id number] mg 3           st



     e         10:44:                          3D   (three)           [facility]



     (Velphoro)      59                                 times a           l



     500 mg                                         day with           



     tablet,chew                                         meals.           



     able                                                        

 

     ondansetron      2022      Yes            4mg  Q8H  Take 4 mg           [facility]

[facility]



     (ZOFRAN) 4      1-18                               by mouth           st



     MG tablet      10:44:                               every 8           [facility]

ta



               59                                 (eight)           l



                                                  hours as           



                                                  needed for           



                                                  nausea or           



                                                  vomiting.           

 

     brimonidine      2022      Yes            1[drp] Q.5D Administer         

  [facility]



     (ALPHAGAN      1-18                               1 drop to           st



     P) 0.1 %      10:44:                               both eyes           [facility]

[facility]



     drops      59                                 2 (two)           l



                                                  times a           



                                                  day.           

 

     folic acid      2022      Yes            1mg  QD   Take 1           [facility]

[facility]



     (FOLVITE) 1      -18                               tablet (1           st



     MG tablet      10:44:                               mg total)           [facility]

[facility]



               59                                 by mouth           l



                                                  daily.           

 

     dicyclomine      2022      Yes            20mg Q.[id number] Take 1         

  [facility]



     (BENTYL) 20      -18                          [id number] tablet (20       

    st



     mg tablet      10:44:                          3W   mg total)           [facility]

[facility]



               59                                 by mouth 3           l



                                                  (three)           



                                                  times a           



                                                  week.           

 

     sevelamer      2022      Yes            800mg Q.[id number] Take 1          

 [facility]



     (RENVELA)      1-18                          [id number] tablet           st



     800 mg      10:44:                          3D   (800 mg           [facility]



     tablet      59                                 total) by           l



                                                  mouth 3           



                                                  (three)           



                                                  times a           



                                                  day with           



                                                  meals. 2           



                                                  tabs with           



                                                  meals           

 

     atorvastati      2022      Yes            10mg QD   Take 10 mg           

[facility]



     n (LIPITOR)      -18                               by mouth           st



     10 mg      10:44:                               every           [facility]



     tablet      59                                 evening.           l

 

     sucroferric      2022      Yes            500mg Q.[id number] Chew 500      

     [facility]



     oxyhydroxid      1-18                          [id number] mg 3           st



     e         10:44:                          3D   (three)           [facility]



     (Velphoro)      59                                 times a           l



     500 mg                                         day with           



     tablet,chew                                         meals.           



     able                                                        

 

     ondansetron      2022      Yes            4mg  Q8H  Take 4 mg           [facility]

[facility]



     (ZOFRAN) 4      1-18                               by mouth           st



     MG tablet      10:44:                               every 8           [facility]

ta



               59                                 (eight)           l



                                                  hours as           



                                                  needed for           



                                                  nausea or           



                                                  vomiting.           

 

     brimonidine      2022      Yes            1[drp] Q.5D Administer         

  [facility]



     (ALPHAGAN      1-18                               1 drop to           st



     P) 0.1 %      10:44:                               both eyes           [facility]

[facility]



     drops      59                                 2 (two)           l



                                                  times a           



                                                  day.           

 

     folic acid      2022      Yes            1mg  QD   Take 1           [facility]

[facility]



     (FOLVITE) 1                                     tablet (1           st



     MG tablet      10:44:                               mg total)           [facility]

[facility]



               59                                 by mouth           l



                                                  daily.           

 

     dicyclomine      2022      Yes            20mg Q.[id number] Take 1         

  [facility]



     (BENTYL) 20                                [id number] tablet (20       

    st



     mg tablet      10:44:                          3W   mg total)           [facility]

[facility]



               59                                 by mouth 3           l



                                                  (three)           



                                                  times a           



                                                  week.           

 

     sevelamer      2022      Yes            800mg Q.[id number] Take 1          

 [facility]



     (RENVELA)                                [id number] tablet           st



     800 mg      10:44:                          3D   (800 mg           [facility]



     tablet      59                                 total) by           l



                                                  mouth 3           



                                                  (three)           



                                                  times a           



                                                  day with           



                                                  meals. 2           



                                                  tabs with           



                                                  meals           

 

     apixaban      2022 No             2.5mg Q.5D Take 1           [facility]

[facility]



     (ELIQUIS)                                tablet           st



     2.5 mg      10:03: 00:00                          (2.5 mg           [facility]



     tablet      30   :00                           total) by           l



                                                  mouth 2           



                                                  (two)           



                                                  times a           



                                                  day.           

 

     apixaban      2022 No             2.5mg Q.5D Take 1           [facility]

[facility]



     (ELIQUIS)                                tablet           st



     2.5 mg      10:03: 00:00                          (2.5 mg           [facility]



     tablet      30   :00                           total) by           l



                                                  mouth 2           



                                                  (two)           



                                                  times a           



                                                  day.           

 

     apixaban      2022 No             2.5mg Q.5D Take 1           [facility]

[facility]



     (ELIQUIS)                                tablet           st



     2.5 mg      10:03: 00:00                          (2.5 mg           [facility]



     tablet      30   :00                           total) by           l



                                                  mouth 2           



                                                  (two)           



                                                  times a           



                                                  day.           

 

     apixaban      2022      Yes            5mg  Q.5D Take 1           [facility]



     (ELIQUIS) 5      07                               tablet (5           st



     mg tablet      00:00:                               mg total)           [facility]

[facility]



               00                                 by mouth 2           l



                                                  (two)           



                                                  times a           



                                                  day.           

 

     apixaban      2022      Yes            5mg  Q.5D Take 1           [facility]



     (ELIQUIS) 5      07                               tablet (5           st



     mg tablet      00:00:                               mg total)           [facility]

[facility]



               00                                 by mouth 2           l



                                                  (two)           



                                                  times a           



                                                  day.           

 

     apixaban      2022      Yes            5mg  Q.5D Take 1           [facility]



     (ELIQUIS) 5      -07                               tablet (5           st



     mg tablet      00:00:                               mg total)           [facility]

[facility]



               00                                 by mouth 2           l



                                                  (two)           



                                                  times a           



                                                  day.           

 

     atorvastati      2022      Yes            10mg QD   Take 10 mg           

[facility]



     n (LIPITOR)      06                               by mouth           st



     10 mg      18:12:                               every           [facility]



     tablet      11                                 evening.           l

 

     sucroferric      2022      Yes            500mg Q.[id number] Chew 500      

     [facility]



     oxyhydroxid                                [id number] mg 3           st



     e         18:12:                          3D   (three)           [facility]



     (Velphoro)      11                                 times a           l



     500 mg                                         day with           



     tablet,chew                                         meals.           



     able                                                        

 

     ondansetron      2022      Yes            4mg  Q8H  Take 4 mg           [facility]

[facility]



     (ZOFRAN) 4      06                               by mouth           st



     MG tablet      18:12:                               every 8           [facility]

ta



               11                                 (eight)           l



                                                  hours as           



                                                  needed for           



                                                  nausea or           



                                                  vomiting.           

 

     brimonidine      2022      Yes            1[drp] Q.5D Administer         

  [facility]



     (ALPHAGAN                                     1 drop to           st



     P) 0.1 %      18:12:                               both eyes           [facility]

[facility]



     drops      11                                 2 (two)           l



                                                  times a           



                                                  day.           

 

     folic acid      2022      Yes            1mg  QD   Take 1           [facility]

[facility]



     (FOLVITE) 1                                     tablet (1           st



     MG tablet      18:12:                               mg total)           [facility]

[facility]



               11                                 by mouth           l



                                                  daily.           

 

     pantoprazol      2022 No             40mg QD   Take 40 mg          

 [facility]



     e                                   by mouth           st



     (PROTONIX)      16:58: 00:00                          daily.           [facility]

[facility]



     40 MG EC      50   :00                                          l



     tablet                                                        

 

     pantoprazol      2022 No             40mg QD   Take 40 mg          

 [facility]



     e                                   by mouth           st



     (PROTONIX)      16:58: 00:00                          daily.           [facility]

[facility]



     40 MG EC      50   :00                                          l



     tablet                                                        

 

     pantoprazol      2022 No             40mg QD   Take 40 mg          

 [facility]



     e                                   by mouth           st



     (PROTONIX)      16:58: 00:00                          daily.           [facility]

[facility]



     40 MG EC      50   :00                                          l



     tablet                                                        

 

     pantoprazol      2022      Yes            40mg Q.5D Take 1           [facility]

[facility]



     e         1-06                               tablet (40           st



     (PROTONIX)      00:00:                               mg total)           [facility]

[facility]



     40 MG EC      00                                 by mouth 2           l



     tablet                                         (two)           



                                                  times a           



                                                  day.           

 

     pantoprazol      2022      Yes            40mg Q.5D Take 1           [facility]

[facility]



     e         1-06                               tablet (40           st



     (PROTONIX)      00:00:                               mg total)           [facility]

[facility]



     40 MG EC      00                                 by mouth 2           l



     tablet                                         (two)           



                                                  times a           



                                                  day.           

 

     pantoprazol      2022      Yes            40mg Q.5D Take 1           [facility]

[facility]



     e         1-06                               tablet (40           st



     (PROTONIX)      00:00:                               mg total)           [facility]

[facility]



     40 MG EC      00                                 by mouth 2           l



     tablet                                         (two)           



                                                  times a           



                                                  day.           

 

     acetaminoph      2022- No        [id number] 1{tbl} Q.5D Take 1           

[facility]



     en-codeine                                tablet by           st



     (TYLENOL      00:00: 04:59                          mouth 2           [facility]

ta



     WITH      00   :00                           (two)           l



     CODEINE #3)                                         times a           



     300-30 mg                                         day for 10           



     per tablet                                         days           



                                                  .chronic           



                                                  pain.           

 

     acetaminoph      2022- No        [id number] 1{tbl} Q.5D Take 1           

[facility]



     en-codeine                                tablet by           st



     (TYLENOL      00:00: 04:59                          mouth 2           [facility]

ta



     WITH      00   :00                           (two)           l



     CODEINE #3)                                         times a           



     300-30 mg                                         day for 10           



     per tablet                                         days           



                                                  .chronic           



                                                  pain.           

 

     acetaminoph      2022- No        [id number] 1{tbl} Q.5D Take 1           

[facility]



     en-codeine                                tablet by           st



     (TYLENOL      00:00: 04:59                          mouth 2           [facility]

ta



     WITH      00   :00                           (two)           l



     CODEINE #3)                                         times a           



     300-30 mg                                         day for 10           



     per tablet                                         days           



                                                  .chronic           



                                                  pain.           

 

     acetaminoph      2022- No        02775 1{tbl} Q.5D Take 1           

[facility]



     en-codeine                                tablet by           st



     (TYLENOL      00:00: 00:00                          mouth 2           [facility]

ta



     WITH      00   :00                           (two)           l



     CODEINE #3)                                         times a           



     300-30 mg                                         day for 10           



     per tablet                                         days           



                                                  .acute           



                                                  pain.           

 

     acetaminoph      2022- No        91239 1{tbl} Q.5D Take 1           

[facility]



     en-codeine                                tablet by           st



     (TYLENOL      00:00: 00:00                          mouth 2           [facility]

ta



     WITH      00   :00                           (two)           l



     CODEINE #3)                                         times a           



     300-30 mg                                         day for 10           



     per tablet                                         days           



                                                  .acute           



                                                  pain.           

 

     acetaminoph      2022- No        84473 1{tbl} Q.5D Take 1           

[facility]



     en-codeine                                tablet by           st



     (TYLENOL      00:00: 00:00                          mouth 2           [facility]

ta



     WITH      00   :00                           (two)           l



     CODEINE #3)                                         times a           



     300-30 mg                                         day for 10           



     per tablet                                         days           



                                                  .acute           



                                                  pain.           

 

     methocarbam      2022- No             500mg Q.25D Take 500          

 [facility]



     oL                                  mg by           st



     (ROBAXIN)      18:56: 00:00                          mouth 4           [facility]

[facility]



     500 MG      38   :00                           (four)           l



     tablet                                         times a           



                                                  day.           

 

     methocarbam      2022- No             500mg Q.25D Take 500          

 [facility]



     oL                                  mg by           st



     (ROBAXIN)      18:56: 00:00                          mouth 4           [facility]

[facility]



     500 MG      38   :00                           (four)           l



     tablet                                         times a           



                                                  day.           

 

     methocarbam      2022- No             500mg Q.25D Take 500          

 [facility]



     oL                                  mg by           st



     (ROBAXIN)      18:56: 00:00                          mouth 4           [facility]

[facility]



     500 MG      38   :00                           (four)           l



     tablet                                         times a           



                                                  day.           

 

     apixaban      2022- No             2.5mg Q.5D Take 2.5           [facility]

[facility]



     (ELIQUIS) 5      -                          mg by           st



     mg tablet      18:51: 00:00                          mouth 2           [facility]

[facility]



               03   :00                           (two)           l



                                                  times a           



                                                  day.           

 

     apixaban      2022- No             2.5mg Q.5D Take 2.5           [facility]

[facility]



     (ELIQUIS) 5      -03                          mg by           st



     mg tablet      18:51: 00:00                          mouth 2           [facility]

[facility]



               03   :00                           (two)           l



                                                  times a           



                                                  day.           

 

     apixaban      2022- No             2.5mg Q.5D Take 2.5           [facility]

[facility]



     (ELIQUIS) 5                                mg by           st



     mg tablet      18:51: 00:00                          mouth 2           H[facility]

m[facility]



               03   :00                           (two)           l



                                                  times a           



                                                  day.           

 

     sevelamer      -0 2- No             1600mg Q.[id number] Take 1,600    

       [facility]



     (RENVELA)                           [id number] mg by           st



     800 mg      18:50: 00:00                     3D   mouth 3           [facility]



     tablet      58   :00                           (three)           l



                                                  times a           



                                                  day with           



                                                  meals.           

 

     sevelamer      -0 - No             1600mg Q.[id number] Take 1,600    

       [facility]



     (RENVELA)                           [id number] mg by           st



     800 mg      18:50: 00:00                     3D   mouth 3           [facility]



     tablet      58   :00                           (three)           l



                                                  times a           



                                                  day with           



                                                  meals.           

 

     sevelamer      -0 - No             1600mg Q.[id number] Take 1,600    

       [facility]



     (RENVELA)                           [id number] mg by           st



     800 mg      18:50: 00:00                     3D   mouth 3           [facility]



     tablet      58   :00                           (three)           l



                                                  times a           



                                                  day with           



                                                  meals.           

 

     oxyCODONE      -0 - No        44640 5mg  Q4H  Take 5 mg           M

ethodi



     (ROXICODONE                                by mouth           st



     ) 5 MG      18:50: 00:00                          every 4           [facility]



     immediate      30   :00                           (four)           l



     release                                         hours as           



     tablet                                         needed for           



                                                  moderate           



                                                  pain           



                                                  .acute           



                                                  pain.           

 

     oxyCODONE      -0 - No        66913 5mg  Q4H  Take 5 mg           M

ethodi



     (ROXICODONE                                by mouth           st



     ) 5 MG      18:50: 00:00                          every 4           [facility]



     immediate      30   :00                           (four)           l



     release                                         hours as           



     tablet                                         needed for           



                                                  moderate           



                                                  pain           



                                                  .acute           



                                                  pain.           

 

     oxyCODONE      -0 - No        86825 5mg  Q4H  Take 5 mg           M

ethodi



     (ROXICODONE                                by mouth           st



     ) 5 MG      18:50: 00:00                          every 4           [facility]



     immediate      30   :00                           (four)           l



     release                                         hours as           



     tablet                                         needed for           



                                                  moderate           



                                                  pain           



                                                  .acute           



                                                  pain.           

 

     tiZANidine            Yes            4mg  Q.5D Take 1           [facility]

di



     (ZANAFLEX)      9-01                               tablet (4           st



     4 MG tablet      00:00:                               mg total)           H

ospita



               00                                 by mouth 2           l



                                                  (two)           



                                                  times a           



                                                  day as           



                                                  needed for           



                                                  muscle           



                                                  spasms.           

 

     tiZANidine      2022-0      Yes            4mg  Q.5D Take 1           [facility]

di



     (ZANAFLEX)      9-01                               tablet (4           st



     4 MG tablet      00:00:                               mg total)           [facility]

ospita



               00                                 by mouth 2           l



                                                  (two)           



                                                  times a           



                                                  day as           



                                                  needed for           



                                                  muscle           



                                                  spasms.           

 

     tiZANidine      2022-0      Yes            4mg  Q.5D Take 1           [facility]

di



     (ZANAFLEX)      9-01                               tablet (4           st



     4 MG tablet      00:00:                               mg total)           H

ospita



               00                                 by mouth 2           l



                                                  (two)           



                                                  times a           



                                                  day as           



                                                  needed for           



                                                  muscle           



                                                  spasms.           

 

     promethazin      2022-0      Yes            25mg Q6H  Take 1           [facility]

[facility]



     e         8-01                               tablet (25           st



     (PHENERGAN)      00:00:                               mg total)           [facility]

ospita



     25 MG      00                                 by mouth           l



     tablet                                         every 6           



                                                  (six)           



                                                  hours as           



                                                  needed for           



                                                  vomiting           



                                                  or nausea.           

 

     promethazin      2022-0      Yes            25mg Q6H  Take 1           [facility]

[facility]



     e         8-01                               tablet (25           st



     (PHENERGAN)      00:00:                               mg total)           H

ospita



     25 MG      00                                 by mouth           l



     tablet                                         every 6           



                                                  (six)           



                                                  hours as           



                                                  needed for           



                                                  vomiting           



                                                  or nausea.           

 

     promethazin      2022-0      Yes            25mg Q6H  Take 1           [facility]

[facility]



     e         8-01                               tablet (25           st



     (PHENERGAN)      00:00:                               mg total)           [facility]

[facility]



     25 MG      00                                 by mouth           l



     tablet                                         every 6           



                                                  (six)           



                                                  hours as           



                                                  needed for           



                                                  vomiting           



                                                  or nausea.           

 

     promethazin      2022-0      Yes            50mg Q.[id number] Take 1         

  [facility]



     e         7-28                          [id number] tablet (50           st



     (PHENERGAN)      00:00:                          3D   mg total)           [facility]

[facility]



     50 MG      00                                 by mouth 3           l



     tablet                                         (three)           



                                                  times a           



                                                  day as           



                                                  needed for           



                                                  nausea or           



                                                  vomiting.           

 

     promethazin      2022-0      Yes            50mg Q.[id number] Take 1         

  [facility]



     e         7-28                          [id number] tablet (50           st



     (PHENERGAN)      00:00:                          3D   mg total)           H

ospita



     50 MG      00                                 by mouth 3           l



     tablet                                         (three)           



                                                  times a           



                                                  day as           



                                                  needed for           



                                                  nausea or           



                                                  vomiting.           

 

     promethazin      2022-0      Yes            50mg Q.[id number] Take 1         

  [facility]



     e                                   [id number] tablet (50           st



     (PHENERGAN)      00:00:                          3D   mg total)           H

ospita



     50 MG      00                                 by mouth 3           l



     tablet                                         (three)           



                                                  times a           



                                                  day as           



                                                  needed for           



                                                  nausea or           



                                                  vomiting.           

 

     diclofenac      0      Yes            75mg Q.5D Take 1           [facility]

di



     (VOLTAREN)      7-26                               tablet (75           st



     75 MG EC      00:00:                               mg total)           [facility]

[facility]



     tablet      00                                 by mouth 2           l



                                                  (two)           



                                                  times a           



                                                  day.           

 

     diclofenac      0      Yes            75mg Q.5D Take 1           [facility]

di



     (VOLTAREN)      7-26                               tablet (75           st



     75 MG EC      00:00:                               mg total)           [facility]

[facility]



     tablet      00                                 by mouth 2           l



                                                  (two)           



                                                  times a           



                                                  day.           

 

     diclofenac      0      Yes            75mg Q.5D Take 1           [facility]

di



     (VOLTAREN)      7-26                               tablet (75           st



     75 MG EC      00:00:                               mg total)           [facility]

[facility]



     tablet      00                                 by mouth 2           l



                                                  (two)           



                                                  times a           



                                                  day.           

 

     fluconazole            Yes            150mg Q7D  Take 1           [facility]

[facility]



     (DIFLUCAN)      7-25                               tablet           st



     150 MG      00:00:                               (150 mg           [facility]



     tablet      00                                 total) by           l



                                                  mouth once           



                                                  a week. On           



                                                  Monday           

 

     fluconazole            Yes            150mg Q7D  Take 1           [facility]

[facility]



     (DIFLUCAN)      7-25                               tablet           st



     150 MG      00:00:                               (150 mg           [facility]



     tablet      00                                 total) by           l



                                                  mouth once           



                                                  a week. On           



                                                  Monday           

 

     fluconazole            Yes            150mg Q7D  Take 1           [facility]

[facility]



     (DIFLUCAN)      7-25                               tablet           st



     150 MG      00:00:                               (150 mg           [facility]



     tablet      00                                 total) by           l



                                                  mouth once           



                                                  a week. On           



                                                  Monday           

 

     DULoxetine       No             60mg QD   Take 60 mg           

[facility]



     (CYMBALTA)                                by mouth           st



     60 MG      18:01: 00:00                          daily.           [facility]



     capsule      54   :00                                          l

 

     gabapentin      2022- No             300mg Q.5D Take 300           M

[facility]



     (NEURONTIN)                                mg by           st



     100 mg      18:01: 00:00                          mouth 2           [facility]



     capsule      54   :00                           (two)           l



                                                  times a           



                                                  day.           

 

     allopurinoL      2022- No             100mg QD   Take 100           

[facility]



     (ZYLOPRIM)      7-09 07-09                          mg by           st



     100 MG      18:01: 00:00                          mouth           [facility]



     tablet      54   :00                           daily.           l

 

     DULoxetine      -0 2022- No             60mg QD   Take 60 mg           

[facility]



     (CYMBALTA)      -09                          by mouth           st



     60 MG      18:01: 00:00                          daily.           [facility]



     capsule      54   :00                                          l

 

     gabapentin      2-0 2022- No             300mg Q.5D Take 300           M

[facility]



     (NEURONTIN)      -09                          mg by           st



     100 mg      18:01: 00:00                          mouth 2           [facility]



     capsule      54   :00                           (two)           l



                                                  times a           



                                                  day.           

 

     allopurinoL      -0 2022- No             100mg QD   Take 100           

[facility]



     (ZYLOPRIM)      -09                          mg by           st



     100 MG      18:01: 00:00                          mouth           [facility]



     tablet      54   :00                           daily.           l

 

     DULoxetine      -0 2- No             60mg QD   Take 60 mg           

[facility]



     (CYMBALTA)                                by mouth           st



     60 MG      18:01: 00:00                          daily.           [facility]



     capsule      54   :00                                          l

 

     gabapentin      2-0 2022- No             300mg Q.5D Take 300           M

[facility]i



     (NEURONTIN)      -09                          mg by           st



     100 mg      18:01: 00:00                          mouth 2           [facility]



     capsule      54   :00                           (two)           l



                                                  times a           



                                                  day.           

 

     allopurinoL      -0 2- No             100mg QD   Take 100           

[facility]



     (ZYLOPRIM)      -09                          mg by           st



     100 MG      18:01: 00:00                          mouth           [facility]



     tablet      54   :00                           daily.           l

 

     Zithromax      -0      Yes                      See            [facility]      6-29                               Instructio           l



     mg oral      21:14:                               ns, Take 2           [facility]

daryl



     tablet      00                                 tablets by           



                                                  mouth the           



                                                  first day           



                                                  then 1           



                                                  tablet by           



                                                  mouth days           



                                                  2-5. (Pt           



                                                  is on           



                                                  hemodialys           



                                                  is)., X 5           



                                                  day, # 6           



                                                  tab, 0           



                                                  Refill(s),           



                                                  Pharmacy:           



                                                  [facility],           



                                                  [id number],           



                                                  cm,            



                                                  22           



                                                  14:52:00           



                                                  CDT,           



                                                  Height,           



                                                  106.818,           



                                                  kg,            



                                                  22           



                                                  14...           

 

     Zithromax      -0      Yes                      See            [employer]



     Z-Mata 250      6-29                               Instructio           l



     mg oral      21:14:                               ns, Take 2           H[facility]

daryl



     tablet      00                                 tablets by           



                                                  mouth the           



                                                  first day           



                                                  then 1           



                                                  tablet by           



                                                  mouth days           



                                                  2-5. (Pt           



                                                  is on           



                                                  hemodialys           



                                                  is)., X 5           



                                                  day, # 6           



                                                  tab, 0           



                                                  Refill(s),           



                                                  Pharmacy:           



                                                  [facility],           



                                                  170.18,           



                                                  cm,            



                                                  22           



                                                  14:52:00           



                                                  CDT,           



                                                  Height,           



                                                  106.818,           



                                                  kg,            



                                                  22           



                                                  14...           

 

     Zithromax      2022-0      Yes                      See            [employer] 250      6-29                               Instructio           l



     mg oral      21:14:                               ns, Take 2           [facility]

daryl



     tablet      00                                 tablets by           



                                                  mouth the           



                                                  first day           



                                                  then 1           



                                                  tablet by           



                                                  mouth days           



                                                  2-5. (Pt           



                                                  is on           



                                                  hemodialys           



                                                  is)., X 5           



                                                  day, # 6           



                                                  tab, 0           



                                                  Refill(s),           



                                                  Pharmacy:           



                                                  [facility],           



                                                  170.18,           



                                                  cm,            



                                                  22           



                                                  14:52:00           



                                                  CDT,           



                                                  Height,           



                                                  106.818,           



                                                  kg,            



                                                  22           



                                                  14...           

 

     Zithromax      2022-0      Yes                      See            [employer] 250      6-29                               Instructio           l



     mg oral      21:14:                               ns, Take 2           Herm

daryl



     tablet      00                                 tablets by           



                                                  mouth the           



                                                  first day           



                                                  then 1           



                                                  tablet by           



                                                  mouth days           



                                                  2-5. (Pt           



                                                  is on           



                                                  hemodialys           



                                                  is)., X 5           



                                                  day, # 6           



                                                  tab, 0           



                                                  Refill(s),           



                                                  Pharmacy:           



                                                  [facility],           



                                                  170.18,           



                                                  cm,            



                                                  22           



                                                  14:52:00           



                                                  CDT,           



                                                  Height,           



                                                  106.818,           



                                                  kg,            



                                                  22           



                                                  14...           

 

     Zithromax      2-0      Yes                      See            [employer] 250      6-29                               Instructio           l



     mg oral      21:14:                               ns, Take 2           Herm

daryl



     tablet      00                                 tablets by           



                                                  mouth the           



                                                  first day           



                                                  then 1           



                                                  tablet by           



                                                  mouth days           



                                                  2-5. (Pt           



                                                  is on           



                                                  hemodialys           



                                                  is)., X 5           



                                                  day, # 6           



                                                  tab, 0           



                                                  Refill(s),           



                                                  Pharmacy:           



                                                  [facility],           



                                                  170.18,           



                                                  cm,            



                                                  22           



                                                  14:52:00           



                                                  CDT,           



                                                  Height,           



                                                  106.818,           



                                                  kg,            



                                                  22           



                                                  14...           

 

     Zithromax      2022-0      Yes                      See            [employer] 250      6-29                               Instructio           l



     mg oral      21:14:                               ns, Take 2           Herm

daryl



     tablet      00                                 tablets by           



                                                  mouth the           



                                                  first day           



                                                  then 1           



                                                  tablet by           



                                                  mouth days           



                                                  2-5. (Pt           



                                                  is on           



                                                  hemodialys           



                                                  is)., X 5           



                                                  day, # 6           



                                                  tab, 0           



                                                  Refill(s),           



                                                  Pharmacy:           



                                                  [facility],           



                                                  170.18,           



                                                  cm,            



                                                  22           



                                                  14:52:00           



                                                  CDT,           



                                                  Height,           



                                                  106.818,           



                                                  kg,            



                                                  22           



                                                  14...           

 

     Zithromax      2022-0      Yes                      See            [employer] 250      6-29                               Instructio           l



     mg oral      21:14:                               ns, Take 2           [facility]erm

daryl



     tablet      00                                 tablets by           



                                                  mouth the           



                                                  first day           



                                                  then 1           



                                                  tablet by           



                                                  mouth days           



                                                  2-5. (Pt           



                                                  is on           



                                                  hemodialys           



                                                  is)., X 5           



                                                  day, # 6           



                                                  tab, 0           



                                                  Refill(s),           



                                                  Pharmacy:           



                                                  [facility],           



                                                  170.18,           



                                                  cm,            



                                                  22           



                                                  14:52:00           



                                                  CDT,           



                                                  Height,           



                                                  106.818,           



                                                  kg,            



                                                  22           



                                                  14...           

 

     Zithromax      2022-0      Yes                      See            [employer] 250      6-29                               Instructio           l



     mg oral      21:14:                               ns, Take 2           Herm

daryl



     tablet      00                                 tablets by           



                                                  mouth the           



                                                  first day           



                                                  then 1           



                                                  tablet by           



                                                  mouth days           



                                                  2-5. (Pt           



                                                  is on           



                                                  hemodialys           



                                                  is)., X 5           



                                                  day, # 6           



                                                  tab, 0           



                                                  Refill(s),           



                                                  Pharmacy:           



                                                  [facility],           



                                                  170.18,           



                                                  cm,            



                                                  22           



                                                  14:52:00           



                                                  CDT,           



                                                  Height,           



                                                  106.818,           



                                                  kg,            



                                                  22           



                                                  14...           

 

     Zithromax      2-0      Yes                      See            [employer] 250      6-29                               Instructio           l



     mg oral      21:14:                               ns, Take 2           Herm

daryl



     tablet      00                                 tablets by           



                                                  mouth the           



                                                  first day           



                                                  then 1           



                                                  tablet by           



                                                  mouth days           



                                                  2-5. (Pt           



                                                  is on           



                                                  hemodialys           



                                                  is)., X 5           



                                                  day, # 6           



                                                  tab, 0           



                                                  Refill(s),           



                                                  Pharmacy:           



                                                  [facility],           



                                                  170.18,           



                                                  cm,            



                                                  22           



                                                  14:52:00           



                                                  CDT,           



                                                  Height,           



                                                  106.818,           



                                                  kg,            



                                                  22           



                                                  14...           

 

     Zithromax      2022-0      Yes                      See            [employer] 250      6-29                               Instructio           l



     mg oral      21:14:                               ns, Take 2           Herm

daryl



     tablet      00                                 tablets by           



                                                  mouth the           



                                                  first day           



                                                  then 1           



                                                  tablet by           



                                                  mouth days           



                                                  2-5. (Pt           



                                                  is on           



                                                  hemodialys           



                                                  is)., X 5           



                                                  day, # 6           



                                                  tab, 0           



                                                  Refill(s),           



                                                  Pharmacy:           



                                                  [facility]            



                                                  Pharmacy           



                                                  [place],           



                                                  170.18,           



                                                  cm,            



                                                  22           



                                                  14:52:00           



                                                  CDT,           



                                                  Height,           



                                                  106.818,           



                                                  kg,            



                                                  22           



                                                  14...           

 

     Velphoro      2022-0      Yes                      500 mg,           [facility]

a



               6-29                               CHEW,           l



               20:13:                               Daily,           [place]



               00                                 take with           



                                                  food, 0           



                                                  Refill(s)           

 

     Velphoro      2022-0      Yes                      500 mg,           [facility]

a



               6-29                               CHEW,           l



               20:13:                               Daily,           [place]



               00                                 take with           



                                                  food, 0           



                                                  Refill(s)           

 

     Velphoro      2022-0      Yes                      500 mg,           [facility]

a



               6-29                               CHEW,           l



               20:13:                               Daily,           [place]



               00                                 take with           



                                                  food, 0           



                                                  Refill(s)           

 

     Velphoro      2022-0      Yes                      500 mg,           [facility]

a



               6-29                               CHEW,           l



               20:13:                               Daily,           [place]



               00                                 take with           



                                                  food, 0           



                                                  Refill(s)           

 

     Velphoro      2022-0      Yes                      500 mg,           [facility]

a



               6-29                               CHEW,           l



               20:13:                               Daily,           [place]



               00                                 take with           



                                                  food, 0           



                                                  Refill(s)           

 

     Velphoro      2022-0      Yes                      500 mg,           [facility]

a



               6-29                               CHEW,           l



               20:13:                               Daily,           [place]



               00                                 take with           



                                                  food, 0           



                                                  Refill(s)           

 

     Velphoro      2022-0      Yes                      500 mg,           [facility]

a



               6-29                               CHEW,           l



               20:13:                               Daily,           [place]



               00                                 take with           



                                                  food, 0           



                                                  Refill(s)           

 

     Velphoro      2022-0      Yes                      500 mg,           [facility]

a



               6-29                               CHEW,           l



               20:13:                               Daily,           [place]



               00                                 take with           



                                                  food, 0           



                                                  Refill(s)           

 

     Velphoro      2022-0      Yes                      500 mg,           [facility]

a



               6-29                               CHEW,           l



               20:13:                               Daily,           [place]



               00                                 take with           



                                                  food, 0           



                                                  Refill(s)           

 

     Velphoro      2022-0      Yes                      500 mg,           [facility]

a



               6-29                               CHEW,           l



               20:13:                               Daily,           [place]



               00                                 take with           



                                                  food, 0           



                                                  Refill(s)           

 

     sevelamer      2022-0      Yes                      2,400 mg =           Me

moria



     carbonate      6-29                               3 tab, PO,           l



     800 mg oral      20:12:                               [facility],           

[place]



     tablet      00                                 # 270 tab,           



                                                  0              



                                                  Refill(s)           

 

     sevelamer      -0      Yes                      2,400 mg =           Me

[facility]



     carbonate      6-29                               3 tab, PO,           l



     800 mg oral      20:12:                               [facility]als,           

[place]



     tablet      00                                 # 270 tab,           



                                                  0              



                                                  Refill(s)           

 

     sevelamer      -0      Yes                      2,400 mg =           Me

[facility]



     carbonate      6-29                               3 tab, PO,           l



     800 mg oral      20:12:                               [facility]als,           

[place]



     tablet      00                                 # 270 tab,           



                                                  0              



                                                  Refill(s)           

 

     sevelamer      0      Yes                      2,400 mg =           Me

[facility]



     carbonate      6-29                               3 tab, PO,           l



     800 mg oral      20:12:                               TID-Meals,           

[place]



     tablet      00                                 # 270 tab,           



                                                  0              



                                                  Refill(s)           

 

     sevelamer      0      Yes                      2,400 mg =           Me

[facility]



     carbonate      6-29                               3 tab, PO,           l



     800 mg oral      20:12:                               TID-Meals,           

[place]



     tablet      00                                 # 270 tab,           



                                                  0              



                                                  Refill(s)           

 

     sevelamer      0      Yes                      2,400 mg =           Me

[facility]



     carbonate      6-29                               3 tab, PO,           l



     800 mg oral      20:12:                               [facility]als,           

[place]



     tablet      00                                 # 270 tab,           



                                                  0              



                                                  Refill(s)           

 

     sevelamer      0      Yes                      2,400 mg =           Me

[facility]



     carbonate      6-29                               3 tab, PO,           l



     800 mg oral      20:12:                               [facility]als,           

[place]



     tablet      00                                 # 270 tab,           



                                                  0              



                                                  Refill(s)           

 

     sevelamer      -0      Yes                      2,400 mg =           Me

[facility]



     carbonate      6-29                               3 tab, PO,           l



     800 mg oral      20:12:                               TID-[facility]als,           

[place]



     tablet      00                                 # 270 tab,           



                                                  0              



                                                  Refill(s)           

 

     sevelamer      -0      Yes                      2,400 mg =           Me

[facility]



     carbonate      6-29                               3 tab, PO,           l



     800 mg oral      20:12:                               TID-Meals,           

[place]



     tablet      00                                 # 270 tab,           



                                                  0              



                                                  Refill(s)           

 

     sevelamer      -0      Yes                      2,400 mg =           Me

[facility]



     carbonate      6-29                               3 tab, PO,           l



     800 mg oral      20:12:                               TID-Meals,           

[place]



     tablet      00                                 # 270 tab,           



                                                  0              



                                                  Refill(s)           

 

     pantoprazol      2022-0      Yes                      40 mg = 1           M

[facility]



     e 40 mg      6-29                               tab, PO,           l



     oral      20:11:                               Daily, 0           [place]



     enteric      00                                 Refill(s)           



     coated                                                        



     tablet                                                        

 

     pantoprazol      2022-0      Yes                      40 mg = 1           M

[facility]



     e 40 mg      6-29                               tab, PO,           l



     oral      20:11:                               Daily, 0           [place]



     enteric      00                                 Refill(s)           



     coated                                                        



     tablet                                                        

 

     pantoprazol      2022-0      Yes                      40 mg = 1           M

[facility]



     e 40 mg      6-29                               tab, PO,           l



     oral      20:11:                               Daily, 0           [place]



     enteric      00                                 Refill(s)           



     coated                                                        



     tablet                                                        

 

     pantoprazol      2022-0      Yes                      40 mg = 1           M

[facility]



     e 40 mg      6-29                               tab, PO,           l



     oral      20:11:                               Daily, 0           [place]



     enteric      00                                 Refill(s)           



     coated                                                        



     tablet                                                        

 

     pantoprazol      2022-0      Yes                      40 mg = 1           M

[facility]



     e 40 mg      6-29                               tab, PO,           l



     oral      20:11:                               Daily, 0           [place]



     enteric      00                                 Refill(s)           



     coated                                                        



     tablet                                                        

 

     pantoprazol      2022-0      Yes                      40 mg = 1           M

[facility]



     e 40 mg      6-29                               tab, PO,           l



     oral      20:11:                               Daily, 0           [place]



     enteric      00                                 Refill(s)           



     coated                                                        



     tablet                                                        

 

     pantoprazol      2022-0      Yes                      40 mg = 1           M

[facility]



     e 40 mg      6-29                               tab, PO,           l



     oral      20:11:                               Daily, 0           [place]



     enteric      00                                 Refill(s)           



     coated                                                        



     tablet                                                        

 

     pantoprazol      2022-0      Yes                      40 mg = 1           M

[facility]



     e 40 mg      6-29                               tab, PO,           l



     oral      20:11:                               Daily, 0           [place]



     enteric      00                                 Refill(s)           



     coated                                                        



     tablet                                                        

 

     pantoprazol      2022-0      Yes                      40 mg = 1           M

[facility]



     e 40 mg      6-29                               tab, PO,           l



     oral      20:11:                               Daily, 0           [place]



     enteric      00                                 Refill(s)           



     coated                                                        



     tablet                                                        

 

     pantoprazol      2022-0      Yes                      40 mg = 1           M

[facility]



     e 40 mg      6-29                               tab, PO,           l



     oral      20:11:                               Daily, 0           [place]



     enteric      00                                 Refill(s)           



     coated                                                        



     tablet                                                        

 

     oxyCODONE 5      2-0      Yes                      5 mg = 1           Me

[facility]



     mg oral      6-29                               tab, PO,           l



     tablet,      20:10:                               Q4H, PRN           [facility]

n



     immediate      00                                 Pain, prn,           



     release                                         0              



                                                  Refill(s)           

 

     oxyCODONE 0      Yes                      5 mg = 1           Me

[facility]



     mg oral      6-29                               tab, PO,           l



     tablet,      20:10:                               Q4H, PRN           [place]

n



     immediate      00                                 Pain, prn,           



     release                                         0              



                                                  Refill(s)           

 

     oxyCODONE 5      0      Yes                      5 mg = 1           Me

[facility]



     mg oral      6-29                               tab, PO,           l



     tablet,      20:10:                               Q4H, PRN           [place]

n



     immediate      00                                 Pain, prn,           



     release                                         0              



                                                  Refill(s)           

 

     oxyCODONE 0      Yes                      5 mg = 1           Me

[facility]



     mg oral      6-29                               tab, PO,           l



     tablet,      20:10:                               Q4H, PRN           [place]

n



     immediate      00                                 Pain, prn,           



     release                                         0              



                                                  Refill(s)           

 

     oxyCODONE 0      Yes                      5 mg = 1           Me

[facility]



     mg oral      6-29                               tab, PO,           l



     tablet,      20:10:                               Q4H, PRN           [place]

n



     immediate      00                                 Pain, prn,           



     release                                         0              



                                                  Refill(s)           

 

     oxyCODONE 0      Yes                      5 mg = 1           Me

[facility]



     mg oral      6-29                               tab, PO,           l



     tablet,      20:10:                               Q4H, PRN           [place]

n



     immediate      00                                 Pain, prn,           



     release                                         0              



                                                  Refill(s)           

 

     oxyCODONE 0      Yes                      5 mg = 1           Me

[facility]



     mg oral      6-29                               tab, PO,           l



     tablet,      20:10:                               Q4H, PRN           [place]

n



     immediate      00                                 Pain, prn,           



     release                                         0              



                                                  Refill(s)           

 

     oxyCODONE 5      0      Yes                      5 mg = 1           Me

[facility]



     mg oral      6-29                               tab, PO,           l



     tablet,      20:10:                               Q4H, PRN           [place]

n



     immediate      00                                 Pain, prn,           



     release                                         0              



                                                  Refill(s)           

 

     oxyCODONE 0      Yes                      5 mg = 1           Me

[facility]



     mg oral      6-29                               tab, PO,           l



     tablet,      20:10:                               Q4H, PRN           [facility]

n



     immediate      00                                 Pain, prn,           



     release                                         0              



                                                  Refill(s)           

 

     oxyCODONE 5      -0      Yes                      5 mg = 1           Me

[facility]



     mg oral      6-29                               tab, PO,           l



     tablet,      20:10:                               Q4H, PRN           [facility]

n



     immediate      00                                 Pain, prn,           



     release                                         0              



                                                  Refill(s)           

 

     ondansetron      2022-0      Yes                      4 mg = 1           Me

[facility]



     4 mg oral      6-29                               tab, PO,           l



     tablet,      20:09:                               TID, PRN           [facility]

n



     disintegrat      00                                 Nausea /           



     ing                                          Vomiting,           



                                                  Dissolve           



                                                  tab under           



                                                  tongue, 0           



                                                  Refill(s)           

 

     ondansetron      2022-0      Yes                      4 mg = 1           Me

[facility]



     4 mg oral      6-29                               tab, PO,           l



     tablet,      20:09:                               TID, PRN           [facility]

n



     disintegrat      00                                 Nausea /           



     ing                                          Vomiting,           



                                                  Dissolve           



                                                  tab under           



                                                  tongue, 0           



                                                  Refill(s)           

 

     ondansetron      2022-0      Yes                      4 mg = 1           Me

[facility]



     4 mg oral      6-29                               tab, PO,           l



     tablet,      20:09:                               TID, PRN           [facility]

n



     disintegrat      00                                 Nausea /           



     ing                                          Vomiting,           



                                                  Dissolve           



                                                  tab under           



                                                  tongue, 0           



                                                  Refill(s)           

 

     ondansetron      2022-0      Yes                      4 mg = 1           Me

[facility]



     4 mg oral      6-29                               tab, PO,           l



     tablet,      20:09:                               TID, PRN           [facility]

n



     disintegrat      00                                 Nausea /           



     ing                                          Vomiting,           



                                                  Dissolve           



                                                  tab under           



                                                  tongue, 0           



                                                  Refill(s)           

 

     ondansetron      2022-0      Yes                      4 mg = 1           Me

[facility]



     4 mg oral      6-29                               tab, PO,           l



     tablet,      20:09:                               TID, PRN           [facility]

n



     disintegrat      00                                 Nausea /           



     ing                                          Vomiting,           



                                                  Dissolve           



                                                  tab under           



                                                  tongue, 0           



                                                  Refill(s)           

 

     ondansetron      2022-0      Yes                      4 mg = 1           Me

[facility]



     4 mg oral      6-29                               tab, PO,           l



     tablet,      20:09:                               TID, PRN           [facility]

n



     disintegrat      00                                 Nausea /           



     ing                                          Vomiting,           



                                                  Dissolve           



                                                  tab under           



                                                  tongue, 0           



                                                  Refill(s)           

 

     ondansetron      2022-0      Yes                      4 mg = 1           Me

[facility]



     4 mg oral      6-29                               tab, PO,           l



     tablet,      20:09:                               TID, PRN           [facility]

n



     disintegrat      00                                 Nausea /           



     ing                                          Vomiting,           



                                                  Dissolve           



                                                  tab under           



                                                  tongue, 0           



                                                  Refill(s)           

 

     ondansetron      2022-0      Yes                      4 mg = 1           Me

[facility]



     4 mg oral      6-29                               tab, PO,           l



     tablet,      20:09:                               TID, PRN           [facility]

n



     disintegrat      00                                 Nausea /           



     ing                                          Vomiting,           



                                                  Dissolve           



                                                  tab under           



                                                  tongue, 0           



                                                  Refill(s)           

 

     ondansetron      2022-0      Yes                      4 mg = 1           Me

[facility]



     4 mg oral      6-29                               tab, PO,           l



     tablet,      20:09:                               TID, PRN           [facility]

n



     disintegrat      00                                 Nausea /           



     ing                                          Vomiting,           



                                                  Dissolve           



                                                  tab under           



                                                  tongue, 0           



                                                  Refill(s)           

 

     ondansetron      -0      Yes                      4 mg = 1           [facility]

[facility]



     4 mg oral      6-29                               tab, PO,           l



     tablet,      20:09:                               TID, PRN           [facility]

n



     disintegrat      00                                 Nausea /           



     ing                                          Vomiting,           



                                                  Dissolve           



                                                  tab under           



                                                  tongue, 0           



                                                  Refill(s)           

 

     Nitazoxanid      -0      Yes                      500 mg = 1           

[facility]



     e 500 mg      6-29                               tab, PO,           l



     oral tablet      20:08:                               Q12H, 0           [facility]

[facility]



               00                                 Refill(s)           

 

     Nitazoxanid      -0      Yes                      500 mg = 1           

[facility]



     e 500 mg      6-29                               tab, PO,           l



     oral tablet      20:08:                               Q12H, 0           [facility]

[facility]



               00                                 Refill(s)           

 

     Nitazoxanid      -0      Yes                      500 mg = 1           

[facility]



     e 500 mg      6-29                               tab, PO,           l



     oral tablet      20:08:                               Q12H, 0           [facility]

[facility]



               00                                 Refill(s)           

 

     Nitazoxanid      -0      Yes                      500 mg = 1           

[facility]



     e 500 mg      6-29                               tab, PO,           l



     oral tablet      20:08:                               Q12H, 0           [facility]

[facility]



               00                                 Refill(s)           

 

     Nitazoxanid      -0      Yes                      500 mg = 1           

[facility]



     e 500 mg      6-29                               tab, PO,           l



     oral tablet      20:08:                               Q12H, 0           [facility]

[place]



               00                                 Refill(s)           

 

     Nitazoxanid      -0      Yes                      500 mg = 1           

[facility]



     e 500 mg      6-29                               tab, PO,           l



     oral tablet      20:08:                               Q12H, 0           [facility]

[place]



               00                                 Refill(s)           

 

     Nitazoxanid      -0      Yes                      500 mg = 1           

[facility]



     e 500 mg      6-29                               tab, PO,           l



     oral tablet      20:08:                               Q12H, 0           [facility]

[facility]



               00                                 Refill(s)           

 

     Nitazoxanid      -0      Yes                      500 mg = 1           

[facility]



     e 500 mg      6-29                               tab, PO,           l



     oral tablet      20:08:                               Q12H, 0           [facility]

[facility]



               00                                 Refill(s)           

 

     Nitazoxanid      -0      Yes                      500 mg = 1           

[facility]



     e 500 mg      6-29                               tab, PO,           l



     oral tablet      20:08:                               Q12H, 0           [facility]

[facility]



               00                                 Refill(s)           

 

     Nitazoxanid      0      Yes                      500 mg = 1           

[facility]



     e 500 mg      6-29                               tab, PO,           l



     oral tablet      20:08:                               Q12H, 0           [facility]

[facility]



               00                                 Refill(s)           

 

     metoprolol      -0      Yes                      25 mg = 1           [facility]

[facility]



     tartrate 25      6-29                               tab, PO,           l



     mg oral      20:07:                               BID, 0           [facility]



     tablet      00                                 Refill(s)           

 

     metoprolol      -0      Yes                      25 mg = 1           [facility]

[facility]



     tartrate 25      6-29                               tab, PO,           l



     mg oral      20:07:                               BID, 0           [place]



     tablet      00                                 Refill(s)           

 

     metoprolol      -0      Yes                      25 mg = 1           [facility]

[facility]



     tartrate 25      6-29                               tab, PO,           l



     mg oral      20:07:                               BID, 0           [facility]



     tablet      00                                 Refill(s)           

 

     metoprolol      -0      Yes                      25 mg = 1           [facility]

[facility]



     tartrate 25      6-29                               tab, PO,           l



     mg oral      20:07:                               BID, 0           [facility]



     tablet      00                                 Refill(s)           

 

     metoprolol      -0      Yes                      25 mg = 1           [facility]

[facility]



     tartrate 25      6-29                               tab, PO,           l



     mg oral      20:07:                               BID, 0           [place]



     tablet      00                                 Refill(s)           

 

     metoprolol      -0      Yes                      25 mg = 1           [facility]

[facility]



     tartrate 25      6-29                               tab, PO,           l



     mg oral      20:07:                               BID, 0           [facility]



     tablet      00                                 Refill(s)           

 

     metoprolol      -0      Yes                      25 mg = 1           [facility]

[facility]



     tartrate 25      6-29                               tab, PO,           l



     mg oral      20:07:                               BID, 0           [place]



     tablet      00                                 Refill(s)           

 

     metoprolol      -0      Yes                      25 mg = 1           [facility]

[facility]



     tartrate 25      6-29                               tab, PO,           l



     mg oral      20:07:                               BID, 0           [facility]



     tablet      00                                 Refill(s)           

 

     metoprolol      -0      Yes                      25 mg = 1           [facility]

[facility]



     tartrate 25      6-29                               tab, PO,           l



     mg oral      20:07:                               BID, 0           [facility]



     tablet      00                                 Refill(s)           

 

     metoprolol      -0      Yes                      25 mg = 1           [facility]

[facility]



     tartrate 25      6-29                               tab, PO,           l



     mg oral      20:07:                               BID, 0           [facility]



     tablet      00                                 Refill(s)           

 

     methocarbam      2-0      Yes                      250 mg =           [facility]

[facility]



     ol 500 mg      6-29                               0.5 tab,           l



     oral tablet      20:05:                               PO, TID, 0           

[place]



               00                                 Refill(s)           

 

     methocarbam      2-0      Yes                      250 mg =           [facility]

[facility]



     ol 500 mg      6-29                               0.5 tab,           l



     oral tablet      20:05:                               PO, TID, 0           

[place]



               00                                 Refill(s)           

 

     methocarbam      2-0      Yes                      250 mg =           [facility]

[facility]



     ol 500 mg      6-29                               0.5 tab,           l



     oral tablet      20:05:                               PO, TID, 0           

[place]



               00                                 Refill(s)           

 

     methocarbam      2-0      Yes                      250 mg =           [facility]

[facility]



     ol 500 mg      6-29                               0.5 tab,           l



     oral tablet      20:05:                               PO, TID, 0           

[place]



               00                                 Refill(s)           

 

     methocarbam      2-0      Yes                      250 mg =           [facility]

[facility]



     ol 500 mg      6-29                               0.5 tab,           l



     oral tablet      20:05:                               PO, TID, 0           

[place]



               00                                 Refill(s)           

 

     methocarbam      2-0      Yes                      250 mg =           [facility]

[facility]



     ol 500 mg      6-29                               0.5 tab,           l



     oral tablet      20:05:                               PO, TID, 0           

[facility]



               00                                 Refill(s)           

 

     methocarbam      2-0      Yes                      250 mg =           [facility]

[facility]



     ol 500 mg      6-29                               0.5 tab,           l



     oral tablet      20:05:                               PO, TID, 0           

[facility]



               00                                 Refill(s)           

 

     methocarbam      2-0      Yes                      250 mg =           [facility]

[facility]



     ol 500 mg      6-29                               0.5 tab,           l



     oral tablet      20:05:                               PO, TID, 0           

[facility]



               00                                 Refill(s)           

 

     methocarbam      2-0      Yes                      250 mg =           [facility]

[facility]



     ol 500 mg      6-29                               0.5 tab,           l



     oral tablet      20:05:                               PO, TID, 0           

[place]



               00                                 Refill(s)           

 

     methocarbam      2-0      Yes                      250 mg =           [facility]

[facility]



     ol 500 mg      6-29                               0.5 tab,           l



     oral tablet      20:05:                               PO, TID, 0           

[facility]



               00                                 Refill(s)           

 

     Alphagan P      2-0      Yes                      1 drp,           [facility]

[facility]



     0.1%      6-29                               OPTH, Q12H           l



     ophthalmic      20:04:                                              [facility]



     solution      00                                                

 

     folic acid      -0      Yes                      1 mg, PO,           [facility]

[facility]



               6-29                               Daily, 0           l



               20:04:                               Refill(s)           [facility]



               00                                                

 

     Alphagan P      -0      Yes                      1 drp,           [facility]

[facility]



     0.1%      6-29                               OPTH, Q12H           l



     ophthalmic      20:04:                                              [facility]



     solution      00                                                

 

     folic acid      -0      Yes                      1 mg, PO,           [facility]

[facility]



               6-29                               Daily, 0           l



               20:04:                               Refill(s)           [place]



               00                                                

 

     Alphagan P      -0      Yes                      1 drp,           [facility]

[facility]



     0.1%      -29                               OPTH, Q12H           l



     ophthalmic      20:04:                                              [facility]



     solution      00                                                

 

     folic acid      -0      Yes                      1 mg, PO,           [facility]

[facility]



               6-29                               Daily, 0           l



               20:04:                               Refill(s)           [place]



               00                                                

 

     Alphagan P      -0      Yes                      1 drp,           [facility]

[facility]



     0.1%      6-29                               OPTH, Q12H           l



     ophthalmic      20:04:                                              [place]



     solution      00                                                

 

     folic acid      -0      Yes                      1 mg, PO,           [facility]

[facility]



               6-29                               Daily, 0           l



               20:04:                               Refill(s)           [facility]



               00                                                

 

     Alphagan P      -0      Yes                      1 drp,           [facility]

[facility]



     0.1%      -                               OPTH, Q12H           l



     ophthalmic      20:04:                                              [place]



     solution      00                                                

 

     folic acid      -0      Yes                      1 mg, PO,           [facility]

[facility]



               6-29                               Daily, 0           l



               20:04:                               Refill(s)           [place]



               00                                                

 

     Alphagan P      -0      Yes                      1 drp,           [facility]

[facility]



     0.1%      -                               OPTH, Q12H           l



     ophthalmic      20:04:                                              [place]



     solution      00                                                

 

     folic acid      -0      Yes                      1 mg, PO,           [facility]

[facility]



               6-29                               Daily, 0           l



               20:04:                               Refill(s)           [place]



               00                                                

 

     Alphagan P      -0      Yes                      1 drp,           [facility]

[facility]



     0.1%      6-29                               OPTH, Q12H           l



     ophthalmic      20:04:                                              [facility]



     solution      00                                                

 

     folic acid      -0      Yes                      1 mg, PO,           [facility]

[facility]



               6-29                               Daily, 0           l



               20:04:                               Refill(s)           [facility]



               00                                                

 

     Alphagan P      -0      Yes                      1 drp,           [facility]

[facility]



     0.1%      6-29                               OPTH, Q12H           l



     ophthalmic      20:04:                                              [place]



     solution      00                                                

 

     folic acid      -0      Yes                      1 mg, PO,           [facility]

[facility]



               6-29                               Daily, 0           l



               20:04:                               Refill(s)           [place]



               00                                                

 

     Alphagan P      -0      Yes                      1 drp,           [facility]

[facility]



     0.1%      -                               OPTH, Q12H           l



     ophthalmic      20:04:                                              [facility]



     solution      00                                                

 

     folic acid      -0      Yes                      1 mg, PO,           [facility]

[facility]



               6-                               Daily, 0           l



               20:04:                               Refill(s)           [facility]



               00                                                

 

     Alphagan P      -0      Yes                      1 drp,           [facility]

[facility]



     0.1%                                     OPTH, Q12H           l



     ophthalmic      20:04:                                              [facility]



     solution      00                                                

 

     folic acid      -0      Yes                      1 mg, PO,           [facility]

[facility]



               6-                               Daily, 0           l



               20:04:                               Refill(s)           [place]



               00                                                

 

     Oxygen      -0      Yes                      1 btl,           [facility]



               6-29                               MISC,           l



               20:03:                               Daily, 2           [facility]



               00                                 liters, 0           



                                                  Refill(s)           

 

     Oxygen      -0      Yes                      1 btl,           [facility]



               6-29                               MISC,           l



               20:03:                               Daily, 2           [place]



               00                                 liters, 0           



                                                  Refill(s)           

 

     Oxygen      -0      Yes                      1 btl,           [facility]



               6-29                               MISC,           l



               20:03:                               Daily, 2           [place]



               00                                 liters, 0           



                                                  Refill(s)           

 

     Oxygen      -0      Yes                      1 btl,           [facility]



               6-29                               MISC,           l



               20:03:                               Daily, 2           [place]



               00                                 liters, 0           



                                                  Refill(s)           

 

     Oxygen      -0      Yes                      1 btl,           [facility]



               6-29                               MISC,           l



               20:03:                               Daily, 2           [place]



               00                                 liters, 0           



                                                  Refill(s)           

 

     Oxygen      -0      Yes                      1 btl,           [facility]



               6-29                               MISC,           l



               20:03:                               Daily, 2           [facility]



               00                                 liters, 0           



                                                  Refill(s)           

 

     Oxygen      -0      Yes                      1 btl,           [facility]



               6-29                               MISC,           l



               20:03:                               Daily, 2           [place]



               00                                 liters, 0           



                                                  Refill(s)           

 

     Oxygen      -0      Yes                      1 btl,           [facility]



               6-29                               MISC,           l



               20:03:                               Daily, 2           [facility]



               00                                 liters, 0           



                                                  Refill(s)           

 

     Oxygen      -0      Yes                      1 btl,           [facility]



               6-29                               MISC,           l



               20:03:                               Daily, 2           [place]



               00                                 liters, 0           



                                                  Refill(s)           

 

     Oxygen      -0      Yes                      1 btl,           [facility]



               6-29                               MISC,           l



               20:03:                               Daily, 2           [place]



               00                                 liters, 0           



                                                  Refill(s)           

 

     Eliquis 2.5      0      Yes                      2.5 mg,           [facility]

[facility]



     mg oral      6-29                               PO, Q12H,           l



     tablet      20:02:                               tab, 0           [facility]



               00                                 Refill(s)           

 

     Eliquis 2.5      0      Yes                      2.5 mg,           [facility]

[facility]



     mg oral      6-29                               PO, Q12H,           l



     tablet      20:02:                               tab, 0           [place]



               00                                 Refill(s)           

 

     Eliquis 2.5      0      Yes                      2.5 mg,           [facility]

[facility]



     mg oral      6-29                               PO, Q12H,           l



     tablet      20:02:                               tab, 0           [place]



               00                                 Refill(s)           

 

     Eliquis 2.5      0      Yes                      2.5 mg,           [facility]

[facility]



     mg oral      6-29                               PO, Q12H,           l



     tablet      20:02:                               tab, 0           [place]



               00                                 Refill(s)           

 

     Eliquis 2.5      0      Yes                      2.5 mg,           [facility]

[facility]



     mg oral      6-29                               PO, Q12H,           l



     tablet      20:02:                               tab, 0           [facility]



               00                                 Refill(s)           

 

     Eliquis 2.5      0      Yes                      2.5 mg,           [facility]

[facility]



     mg oral      6-29                               PO, Q12H,           l



     tablet      20:02:                               tab, 0           [place]



               00                                 Refill(s)           

 

     Eliquis 2.5      0      Yes                      2.5 mg,           [facility]

[facility]



     mg oral      6-29                               PO, Q12H,           l



     tablet      20:02:                               tab, 0           [place]



               00                                 Refill(s)           

 

     Eliquis 2.5      0      Yes                      2.5 mg,           [facility]

[facility]



     mg oral      6-29                               PO, Q12H,           l



     tablet      20:02:                               tab, 0           [place]



               00                                 Refill(s)           

 

     Eliquis 2.5      0      Yes                      2.5 mg,           [facility]

[facility]



     mg oral      6-29                               PO, Q12H,           l



     tablet      20:02:                               tab, 0           [place]



               00                                 Refill(s)           

 

     Eliquis 2.5      2022-0      Yes                      2.5 mg,           [facility]

[facility]



     mg oral      6-29                               PO, Q12H,           l



     tablet      20:02:                               tab, 0           [facility]



               00                                 Refill(s)           

 

     doxycycline      2022- No             200mg Q.5D Take 200           

[facility]



     (VIBRAMYCIN      6-26 06-25                          mg by           st



     ) 100 MG      13:07: 00:00                          mouth 2           [facility]

[facility]



     capsule      01   :00                           (two)           l



                                                  times a           



                                                  day. Take           



                                                  200mg (x2           



                                                  100mg           



                                                  capsules).           



                                                  Per            



                                                  patient           



                                                  started           



                                                  taking           



                                                  2022           

 

     glucosam/ch      2022- No             1{tbl} Q.5D Take 1           [facility]

[facility]



     on-msm1/C/m      -25                          tablet by           st



     ang/bosw      13:07: 00:00                          mouth 2           [facility]

[facility]



     (OSTEO      01   :00                           (two)           l



     BI-FLEX                                         times a           



     TRIPLE                                         day.           



     STRENGTH                                                        



     ORAL)                                                        

 

     NON       2022- No             1{capsu QD   Take 1           [facility]



     FORMULARY      6-26 06-25                le}       capsule by           st



               13:07: 00:00                          mouth           [facility]



               01   :00                           nightly.           l



                                                  Patient           



                                                  takes           



                                                  young           



                                                  living           



                                                  probiotic           



                                                  with 17           



                                                  billion           



                                                  active           



                                                  cultures           

 

     doxycycline      2022- No             200mg Q.5D Take 200           

[facility]



     (VIBRAMYCIN      6-26 06-25                          mg by           st



     ) 100 MG      13:07: 00:00                          mouth 2           [facility]

[facility]



     capsule      01   :00                           (two)           l



                                                  times a           



                                                  day. Take           



                                                  200mg (x2           



                                                  100mg           



                                                  capsules).           



                                                  Per            



                                                  patient           



                                                  started           



                                                  taking           



                                                  2022           

 

     glucosam/ch      0 - No             1{tbl} Q.5D Take 1           [facility]

[facility]



     on-msm1/C/m      -25                          tablet by           st



     ang/bos      13:07: 00:00                          mouth 2           [facility]

[facility]



     (OSTEO      01   :00                           (two)           l



     BI-FLEX                                         times a           



     TRIPLE                                         day.           



     STRENGTH                                                        



     ORAL)                                                        

 

     NON       2022- No             1{capsu QD   Take 1           [facility]



     FORMULARY      6-26 06-25                le}       capsule by           st



               13:07: 00:00                          mouth           [facility]



               01   :00                           nightly.           l



                                                  Patient           



                                                  takes           



                                                  young           



                                                  living           



                                                  probiotic           



                                                  with 17           



                                                  billion           



                                                  active           



                                                  cultures           

 

     doxycycline      2022- No             200mg Q.5D Take 200           

[facility]



     (VIBRAMYCIN      6-26 06-25                          mg by           st



     ) 100 MG      13:07: 00:00                          mouth 2           [facility]

ta



     capsule      01   :00                           (two)           l



                                                  times a           



                                                  day. Take           



                                                  200mg (x2           



                                                  100mg           



                                                  capsules).           



                                                  Per            



                                                  patient           



                                                  started           



                                                  taking           



                                                  2022           

 

     glucosam/ch      2022- No             1{tbl} Q.5D Take 1           M

[facility]i



     on-msm1/C/m                                tablet by           st



     ang/inaw      13:07: 00:00                          mouth 2           [facility]

ta



     (OSTEO      01   :00                           (two)           l



     BI-FLEX                                         times a           



     TRIPLE                                         day.           



     STRENGTH                                                        



     ORAL)                                                        

 

     NON       2022- No             1{capsu QD   Take 1           [facility]



     FORMULARY                      le}       capsule by           st



               13:07: 00:00                          mouth           [facility]



               01   :00                           nightly.           l



                                                  Patient           



                                                  takes           



                                                  young           



                                                  living           



                                                  probiotic           



                                                  with 17           



                                                  billion           



                                                  active           



                                                  cultures           

 

     folic acid      2022- No             1mg  QD   Take 1           [facility]

[facility]



     (FOLVITE) 1                                tablet (1           st



     MG tablet      00:00: 04:59                          mg total)           [facility]

[facility]



               00   :00                           by mouth           l



                                                  daily for           



                                                  30 days.           

 

     folic acid      2022- No             1mg  QD   Take 1           [facility]

[facility]



     (FOLVITE) 1                                tablet (1           st



     MG tablet      00:00: 04:59                          mg total)           [facility]

[facility]



               00   :00                           by mouth           l



                                                  daily for           



                                                  30 days.           

 

     folic acid      2022- No             1mg  QD   Take 1           [facility]

[facility]



     (FOLVITE) 1                                tablet (1           st



     MG tablet      00:00: 04:59                          mg total)           [facility]

[facility]



               00   :00                           by mouth           l



                                                  daily for           



                                                  30 days.           

 

     methocarbam      2022- No             250mg [id number] Take 0.5     

      [facility]



     oL        -                     [id number] tablets           st



     (ROBAXIN)      00:00: 04:59                     3D   (250 mg           Hosp

[facility]imi



     500 MG      00   :00                           total) by           l



     tablet                                         mouth 3           



                                                  (three)           



                                                  times a           



                                                  day for 7           



                                                  days.           

 

     nitazoxanid      2022- No             500mg Q.5D Take 1           Me

thodi



     e (ALINIA)                                tablet           st



     500 MG      00:00: 04:59                          (500 mg           [facility]osp[facility]



     tablet      00   :00                           total) by           l



                                                  mouth 2           



                                                  (two)           



                                                  times a           



                                                  day for 7           



                                                  days.           

 

     methocarbam      202-0 2022- No             250mg [id number] Take 0.5     

      [facility]



     oL                             [id number] tablets           st



     (ROBAXIN)      00:00: 04:59                     3D   (250 mg           Hosp

m[facility]



     500 MG      00   :00                           total) by           l



     tablet                                         mouth 3           



                                                  (three)           



                                                  times a           



                                                  day for 7           



                                                  days.           

 

     nitazoxanid      2022-0 2022- No             500mg Q.5D Take 1           Me

thodi



     e (ALINIA)                                tablet           st



     500 MG      00:00: 04:59                          (500 mg           [facility]osp[facility]



     tablet      00   :00                           total) by           l



                                                  mouth 2           



                                                  (two)           



                                                  times a           



                                                  day for 7           



                                                  days.           

 

     methocarbam      -0 202- No             250mg [id number] Take 0.5     

      [facility]



     oL                             [id number] tablets           st



     (ROBAXIN)      00:00: 04:59                     3D   (250 mg           Hosp

[facility]



     500 MG      00   :00                           total) by           l



     tablet                                         mouth 3           



                                                  (three)           



                                                  times a           



                                                  day for 7           



                                                  days.           

 

     nitazoxanid      -0 202- No             500mg Q.5D Take 1           Me

thodi



     e (ALINIA)                                tablet           st



     500 MG      00:00: 04:59                          (500 mg           [facility]osp[facility]



     tablet      00   :00                           total) by           l



                                                  mouth 2           



                                                  (two)           



                                                  times a           



                                                  day for 7           



                                                  days.           

 

     oxyCODONE      -0 - No        73575 5mg  Q4H  Take 1           [facility]

[facility]



     (ROXICODONE      6-25 -01                          tablet (5           st



     ) 5 MG      00:00: 04:59                          mg total)           [facility]

ta



     immediate      00   :00                           by mouth           l



     release                                         every 4           



     tablet                                         (four)           



                                                  hours as           



                                                  needed for           



                                                  severe           



                                                  pain for           



                                                  up to 20           



                                                  doses           



                                                  .acute           



                                                  pain. Max           



                                                  Daily           



                                                  Amount: 30           



                                                  mg             

 

     oxyCODONE      2022-0 2022- No        10550 5mg  Q4H  Take 1           [facility]

[facility]



     (ROXICODONE      6-25 07-01                          tablet (5           st



     ) 5 MG      00:00: 04:59                          mg total)           [facility]

ta



     immediate      00   :00                           by mouth           l



     release                                         every 4           



     tablet                                         (four)           



                                                  hours as           



                                                  needed for           



                                                  severe           



                                                  pain for           



                                                  up to 20           



                                                  doses           



                                                  .acute           



                                                  pain. Max           



                                                  Daily           



                                                  Amount: 30           



                                                  mg             

 

     oxyCODONE      2022-0 2022- No        [id number] 5mg  Q4H  Take 1           [facility]

[facility]



     (ROXICODONE      6-25 -01                          tablet (5           st



     ) 5 MG      00:00: 04:59                          mg total)           [facility]

ta



     immediate      00   :00                           by mouth           l



     release                                         every 4           



     tablet                                         (four)           



                                                  hours as           



                                                  needed for           



                                                  severe           



                                                  pain for           



                                                  up to 20           



                                                  doses           



                                                  .acute           



                                                  pain. Max           



                                                  Daily           



                                                  Amount: 30           



                                                  mg             

 

     tizanidine      2022-0      Yes                      4 mg = 1           [facility]

[facility]



     4 mg oral      5-16                               tab, PO,           l



     tablet      18:09:                               Bedtime,           [facility]



               00                                 PRN AS           



                                                  NEEDED FOR           



                                                  MUSCLE           



                                                  SPASM, #           



                                                  15 tab, 0           



                                                  Refill(s),           



                                                  Pharmacy:           



                                                  [facility]           



                                                  #[id number],           



                                                  165.1, cm,           



                                                  21           



                                                  15:23:00           



                                                  CST,           



                                                  Height,           



                                                  107.301,           



                                                  kg,            



                                                  21           



                                                  15:23:00           



                                                  CST,           



                                                  Weight           

 

     tizanidine      2022-0      Yes                      4 mg = 1           [facility]

[facility]



     4 mg oral      5-16                               tab, PO,           l



     tablet      18:09:                               Bedtime,           [facility]



               00                                 PRN AS           



                                                  NEEDED FOR           



                                                  MUSCLE           



                                                  SPASM, #           



                                                  15 tab, 0           



                                                  Refill(s),           



                                                  Pharmacy:           



                                                  [facility]           



                                                  #[id number],           



                                                  165.1, cm,           



                                                  21           



                                                  15:23:00           



                                                  CST,           



                                                  Height,           



                                                  107.301,           



                                                  kg,            



                                                  21           



                                                  15:23:00           



                                                  CST,           



                                                  Weight           

 

     tizanidine      2-0      Yes                      4 mg = 1           [facility]

[facility]



     4 mg oral      5-16                               tab, PO,           l



     tablet      18:09:                               Bedtime,           [facility]



               00                                 PRN AS           



                                                  NEEDED FOR           



                                                  MUSCLE           



                                                  SPASM, #           



                                                  15 tab, 0           



                                                  Refill(s),           



                                                  Pharmacy:           



                                                  [facility]           



                                                  #[id number],           



                                                  165.1, cm,           



                                                  21           



                                                  15:23:00           



                                                  CST,           



                                                  Height,           



                                                  107.301,           



                                                  kg,            



                                                  21           



                                                  15:23:00           



                                                  CST,           



                                                  Weight           

 

     tizanidine      2022-0      Yes                      4 mg = 1           [facility]

[facility]



     4 mg oral      5-16                               tab, PO,           l



     tablet      18:09:                               Bedtime,           [place]



               00                                 PRN AS           



                                                  NEEDED FOR           



                                                  MUSCLE           



                                                  SPASM, #           



                                                  15 tab, 0           



                                                  Refill(s),           



                                                  Pharmacy:           



                                                  [facility]           



                                                  #[id number],           



                                                  165.1, cm,           



                                                  21           



                                                  15:23:00           



                                                  CST,           



                                                  Height,           



                                                  107.301,           



                                                  kg,            



                                                  21           



                                                  15:23:00           



                                                  CST,           



                                                  Weight           

 

     tizanidine      2022-0      Yes                      4 mg = 1           [facility]

[facility]



     4 mg oral      5-16                               tab, PO,           l



     tablet      18:09:                               Bedtime,           [place]



               00                                 PRN AS           



                                                  NEEDED FOR           



                                                  MUSCLE           



                                                  SPASM, #           



                                                  15 tab, 0           



                                                  Refill(s),           



                                                  Pharmacy:           



                                                  [facility]           



                                                  #[id number],           



                                                  165.1, cm,           



                                                  21           



                                                  15:23:00           



                                                  CST,           



                                                  Height,           



                                                  107.301,           



                                                  kg,            



                                                  21           



                                                  15:23:00           



                                                  CST,           



                                                  Weight           

 

     tizanidine      2022-0      Yes                      4 mg = 1           [facility]

[facility]



     4 mg oral      5-16                               tab, PO,           l



     tablet      18:09:                               Bedtime,           [place]



               00                                 PRN AS           



                                                  NEEDED FOR           



                                                  MUSCLE           



                                                  SPASM, #           



                                                  15 tab, 0           



                                                  Refill(s),           



                                                  Pharmacy:           



                                                  [facility]           



                                                  #[id number],           



                                                  165.1, cm,           



                                                  21           



                                                  15:23:00           



                                                  CST,           



                                                  Height,           



                                                  107.301,           



                                                  kg,            



                                                  21           



                                                  15:23:00           



                                                  CST,           



                                                  Weight           

 

     tizanidine      2022-0      Yes                      4 mg = 1           [facility]

[facility]



     4 mg oral      5-16                               tab, PO,           l



     tablet      18:09:                               Bedtime,           [facility]



               00                                 PRN AS           



                                                  NEEDED FOR           



                                                  MUSCLE           



                                                  SPASM, #           



                                                  15 tab, 0           



                                                  Refill(s),           



                                                  Pharmacy:           



                                                  [facility]           



                                                  #[id number],           



                                                  165.1, cm,           



                                                  21           



                                                  15:23:00           



                                                  CST,           



                                                  Height,           



                                                  107.301,           



                                                  kg,            



                                                  21           



                                                  15:23:00           



                                                  CST,           



                                                  Weight           

 

     tizanidine      2022-0      Yes                      4 mg = 1           [facility]

[facility]



     4 mg oral      5-16                               tab, PO,           l



     tablet      18:09:                               Bedtime,           [place]



               00                                 PRN AS           



                                                  NEEDED FOR           



                                                  MUSCLE           



                                                  SPASM, #           



                                                  15 tab, 0           



                                                  Refill(s),           



                                                  Pharmacy:           



                                                  [facility]           



                                                  #[id number],           



                                                  165.1, cm,           



                                                  21           



                                                  15:23:00           



                                                  CST,           



                                                  Height,           



                                                  107.301,           



                                                  kg,            



                                                  21           



                                                  15:23:00           



                                                  CST,           



                                                  Weight           

 

     tizanidine      2022-0      Yes                      4 mg = 1           [facility]

[facility]



     4 mg oral      5-16                               tab, PO,           l



     tablet      18:09:                               Bedtime,           [place]



               00                                 PRN AS           



                                                  NEEDED FOR           



                                                  MUSCLE           



                                                  SPASM, #           



                                                  15 tab, 0           



                                                  Refill(s),           



                                                  Pharmacy:           



                                                  [facility]           



                                                  [facility]           



                                                  #[id number],           



                                                  165.1, cm,           



                                                  21           



                                                  15:23:00           



                                                  CST,           



                                                  Height,           



                                                  107.301,           



                                                  kg,            



                                                  21           



                                                  15:23:00           



                                                  CST,           



                                                  Weight           

 

     tizanidine            Yes                      4 mg = 1           [facility]

[facility]



     4 mg oral      5-16                               tab, PO,           l



     tablet      18:09:                               Bedtime,           [place]



               00                                 PRN AS           



                                                  NEEDED FOR           



                                                  MUSCLE           



                                                  SPASM, #           



                                                  15 tab, 0           



                                                  Refill(s),           



                                                  Pharmacy:           



                                                  [facility]           



                                                  [facility]           



                                                  #[id number],           



                                                  165.1, cm,           



                                                  21           



                                                  15:23:00           



                                                  CST,           



                                                  Height,           



                                                  107.301,           



                                                  kg,            



                                                  21           



                                                  15:23:00           



                                                  CST,           



                                                  Weight           

 

     nitrofurant      2022- No             100mg Q.5D Take 1           [facility]

[facility]



     oin,      3-11 03-15                          capsule           st



     macrocrysta      00:00: 04:59                          (100 mg           [facility]

[facility]



     l-monohydra      00   :00                           total) by           l



     te,                                          mouth 2           



     (MACROBID)                                         (two)           



     100 MG                                         times a           



     capsule                                         day for 3           



                                                  days.           

 

     nitrofurant      2022- No             100mg Q.5D Take 1           [facility]

[facility]



     oin,      3-11 03-15                          capsule           st



     macrocrysta      00:00: 04:59                          (100 mg           [facility]

[facility]



     l-monohydra      00   :00                           total) by           l



     te,                                          mouth 2           



     (MACROBID)                                         (two)           



     100 MG                                         times a           



     capsule                                         day for 3           



                                                  days.           

 

     nitrofurant      2022- No             100mg Q.5D Take 1           Me

[facility]



     oin,      3-11 03-15                          capsule           st



     macrocrysta      00:00: 04:59                          (100 mg           [facility]

[facility]



     l-monohydra      00   :00                           total) by           l



     te,                                          mouth 2           



     (MACROBID)                                         (two)           



     100 MG                                         times a           



     capsule                                         day for 3           



                                                  days.           

 

     lidocaine      -2022- No             1{patch Q24H Place 1           [facility]

ethodi



     (LIDODERM)      3-09 04-09                }         patch on           st



     5 %       00:00: 04:59                          the skin           [facility]



               00   :00                           in the           l



                                                  morning           



                                                  for 30           



                                                  days.           



                                                  Remove &           



                                                  Discard           



                                                  patch           



                                                  within 12           



                                                  hours or           



                                                  as             



                                                  directed           



                                                  by MD.           

 

     lidocaine      2022- No             1{patch Q24H Place 1           M

ethodi



     (LIDODERM)      3-09 04-09                }         patch on           st



     5 %       00:00: 04:59                          the skin           [facility]



               00   :00                           in the           l



                                                  morning           



                                                  for 30           



                                                  days.           



                                                  Remove &           



                                                  Discard           



                                                  patch           



                                                  within 12           



                                                  hours or           



                                                  as             



                                                  directed           



                                                  by MD.           

 

     lidocaine      2022- No             1{patch Q24H Place 1           M

ethodi



     (LIDODERM)      3-09 04-09                }         patch on           st



     5 %       00:00: 04:59                          the skin           [facility]



               00   :00                           in the           l



                                                  morning           



                                                  for 30           



                                                  days.           



                                                  Remove &           



                                                  Discard           



                                                  patch           



                                                  within 12           



                                                  hours or           



                                                  as             



                                                  directed           



                                                  by MD.           

 

     traMADoL      28 50mg [id number] Take 1          

 [facility]



     (ULTRAM) 50      3-09 03-20                     [id number] tablet (50      

     st



     mg tablet      00:00: 04:59                     3D   mg total)           [facility]

[facility]



               00   :00                           by mouth           l



                                                  as needed           



                                                  in the           



                                                  morning           



                                                  and 1           



                                                  tablet (50           



                                                  mg total)           



                                                  as needed           



                                                  at noon           



                                                  and 1           



                                                  tablet (50           



                                                  mg total)           



                                                  as needed           



                                                  in the           



                                                  evening           



                                                  for            



                                                  moderate           



                                                  pain. Do           



                                                  all this           



                                                  for up to           



                                                  10             



                                                  days.acute           



                                                  pain.           

 

     traMADoL      28 50mg [id number] Take 1          

 [facility]



     (ULTRAM) 50      3-09 03-                     [id number] tablet (50      

     st



     mg tablet      00:00: 04:59                     3D   mg total)           [facility]

[facility]



               00   :00                           by mouth           l



                                                  as needed           



                                                  in the           



                                                  morning           



                                                  and 1           



                                                  tablet (50           



                                                  mg total)           



                                                  as needed           



                                                  at noon           



                                                  and 1           



                                                  tablet (50           



                                                  mg total)           



                                                  as needed           



                                                  in the           



                                                  evening           



                                                  for            



                                                  moderate           



                                                  pain. Do           



                                                  all this           



                                                  for up to           



                                                  10             



                                                  days.acute           



                                                  pain.           

 

     traMADoL      28 50mg [id number] Take 1          

 [facility]



     (ULTRAM) 50      3-09 03-20                     [id number] tablet (50      

     st



     mg tablet      00:00: 04:59                     3D   mg total)           [facility]

[facility]



               00   :00                           by mouth           l



                                                  as needed           



                                                  in the           



                                                  morning           



                                                  and 1           



                                                  tablet (50           



                                                  mg total)           



                                                  as needed           



                                                  at noon           



                                                  and 1           



                                                  tablet (50           



                                                  mg total)           



                                                  as needed           



                                                  in the           



                                                  evening           



                                                  for            



                                                  moderate           



                                                  pain. Do           



                                                  all this           



                                                  for up to           



                                                  10             



                                                  days.acute           



                                                  pain.           

 

     metoprolol            Yes            25mg Q.5D Take 25 mg           M

ethodi



     tartrate      2-21                               by mouth 2           st



     (LOPRESSOR)      00:00:                               (two)           [facility]

ta



     25 mg      00                                 times a           l



     tablet                                         day.           

 

     metoprolol      2022-0      Yes            25mg Q.5D Take 25 mg           M

ethodi



     tartrate      2-21                               by mouth 2           st



     (LOPRESSOR)      00:00:                               (two)           [facility]

ta



     25 mg      00                                 times a           l



     tablet                                         day.           

 

     metoprolol      2022-0      Yes            25mg Q.5D Take 25 mg           M

ethodi



     tartrate      2-21                               by mouth 2           st



     (LOPRESSOR)      00:00:                               (two)           [facility]

ta



     25 mg      00                                 times a           l



     tablet                                         day.           

 

     Ondansetron      2022-0      Yes                      4 mg = 1           [facility]

[facility]



     4 MG Oral      1-07                               tab, PO,           l



     Tablet      22:01:                               BID, X 5           [place]



     [Zofran]                                       day, # 10           



                                                  tab, 0           



                                                  Refill(s),           



                                                  Pharmacy:           



                                                  [facility]           



                                                  [place] [facility]           



                                                  #[id number],           



                                                  165.1, cm,           



                                                  21           



                                                  15:23:00           



                                                  CST,           



                                                  Height,           



                                                  107.301,           



                                                  kg,            



                                                  21           



                                                  15:23:00           



                                                  CST,           



                                                  Weight           

 

     Ondansetron      2022-0      Yes                      4 mg = 1           [facility]

[facility]



     4 MG Oral      1-07                               tab, PO,           l



     Tablet      22:01:                               BID, X 5           [place]



     [Zofran]                                       day, # 10           



                                                  tab, 0           



                                                  Refill(s),           



                                                  Pharmacy:           



                                                  [facility]           



                                                  #[id number],           



                                                  165.1, cm,           



                                                  21           



                                                  15:23:00           



                                                  CST,           



                                                  Height,           



                                                  107.301,           



                                                  kg,            



                                                  21           



                                                  15:23:00           



                                                  CST,           



                                                  Weight           

 

     Ondansetron      2022-0      Yes                      4 mg = 1           [facility]

[facility]



     4 MG Oral      1-07                               tab, PO,           l



     Tablet      22:01:                               BID, X 5           [place]



     [Zofran]                                       day, # 10           



                                                  tab, 0           



                                                  Refill(s),           



                                                  Pharmacy:           



                                                  [facility]           



                                                  #[id number],           



                                                  165.1, cm,           



                                                  21           



                                                  15:23:00           



                                                  CST,           



                                                  Height,           



                                                  107.301,           



                                                  kg,            



                                                  21           



                                                  15:23:00           



                                                  CST,           



                                                  Weight           

 

     Ondansetron      2022-0      Yes                      4 mg = 1           [facility]

[facility]



     4 MG Oral      1-07                               tab, PO,           l



     Tablet      22:01:                               BID, X 5           [place]



     [Zofran]                                       day, # 10           



                                                  tab, 0           



                                                  Refill(s),           



                                                  Pharmacy:           



                                                  [facility]           



                                                  #[id number],           



                                                  165.1, cm,           



                                                  21           



                                                  15:23:00           



                                                  CST,           



                                                  Height,           



                                                  107.301,           



                                                  kg,            



                                                  21           



                                                  15:23:00           



                                                  CST,           



                                                  Weight           

 

     Ondansetron      2022-0      Yes                      4 mg = 1           [facility]

[facility]



     4 MG Oral      1-07                               tab, PO,           l



     Tablet      22:01:                               BID, X 5           [place]



     [Zofran]      00                                 day, # 10           



                                                  tab, 0           



                                                  Refill(s),           



                                                  Pharmacy:           



                                                  [facility]           



                                                  [place] [facility]           



                                                  #[id number],           



                                                  165.1, cm,           



                                                  21           



                                                  15:23:00           



                                                  CST,           



                                                  Height,           



                                                  107.301,           



                                                  kg,            



                                                  21           



                                                  15:23:00           



                                                  CST,           



                                                  Weight           

 

     Ondansetron      2022-0      Yes                      4 mg = 1           Me

moria



     4 MG Oral      1-07                               tab, PO,           l



     Tablet      22:01:                               BID, X 5           [place]



     [Zofran]      00                                 day, # 10           



                                                  tab, 0           



                                                  Refill(s),           



                                                  Pharmacy:           



                                                  [facility]           



                                                  [place] [facility]           



                                                  #[id number],           



                                                  165.1, cm,           



                                                  21           



                                                  15:23:00           



                                                  CST,           



                                                  Height,           



                                                  107.301,           



                                                  kg,            



                                                  21           



                                                  15:23:00           



                                                  CST,           



                                                  Weight           

 

     Ondansetron      2022-0      Yes                      4 mg = 1           Me

[facility]



     4 MG Oral      1-07                               tab, PO,           l



     Tablet      22:01:                               BID, X 5           [place]



     [Zofran]                                       day, # 10           



                                                  tab, 0           



                                                  Refill(s),           



                                                  Pharmacy:           



                                                  [facility]           



                                                  [place] [facility]           



                                                  #[id number],           



                                                  165.1, cm,           



                                                  21           



                                                  15:23:00           



                                                  CST,           



                                                  Height,           



                                                  107.301,           



                                                  kg,            



                                                  21           



                                                  15:23:00           



                                                  CST,           



                                                  Weight           

 

     Ondansetron      2022-0      Yes                      4 mg = 1           Me

[facility]



     4 MG Oral      1-07                               tab, PO,           l



     Tablet      22:01:                               BID, X 5           [place]



     [Zofran]      00                                 day, # 10           



                                                  tab, 0           



                                                  Refill(s),           



                                                  Pharmacy:           



                                                  [facility]           



                                                  [place] [facility]           



                                                  #[id number],           



                                                  165.1, cm,           



                                                  21           



                                                  15:23:00           



                                                  CST,           



                                                  Height,           



                                                  107.301,           



                                                  kg,            



                                                  21           



                                                  15:23:00           



                                                  CST,           



                                                  Weight           

 

     Ondansetron      2022-0      Yes                      4 mg = 1           Me

[facility]



     4 MG Oral      1-07                               tab, PO,           l



     Tablet      22:01:                               BID, X 5           [place]



     [Zofran]                                       day, # 10           



                                                  tab, 0           



                                                  Refill(s),           



                                                  Pharmacy:           



                                                  [facility]           



                                                  [place] [facility]           



                                                  #[id number],           



                                                  165.1, cm,           



                                                  21           



                                                  15:23:00           



                                                  CST,           



                                                  Height,           



                                                  107.301,           



                                                  kg,            



                                                  21           



                                                  15:23:00           



                                                  CST,           



                                                  Weight           

 

     Ondansetron            Yes                      4 mg = 1           Me

[facility]



     4 MG Oral      1-07                               tab, PO,           l



     Tablet      22:01:                               BID, X 5           [place]



     [Zofran]                                       day, # 10           



                                                  tab, 0           



                                                  Refill(s),           



                                                  Pharmacy:           



                                                  [facility]           



                                                  [place] [facility]           



                                                  #[id number],           



                                                  165.1, cm,           



                                                  21           



                                                  15:23:00           



                                                  CST,           



                                                  Height,           



                                                  107.301,           



                                                  kg,            



                                                  21           



                                                  15:23:00           



                                                  CST,           



                                                  Weight           

 

     atorvastati      2021      Yes                      10 mg = 1           M

[facility]



     n 10 mg      2-27                               tab, PO,           l



     oral tablet      21:45:                               Bedtime, #           

[place]



               00                                 90 tab, 0           



                                                  Refill(s),           



                                                  Pharmacy:           



                                                  [facility]           



                                                  #[id number],           



                                                  165.1, cm,           



                                                  21           



                                                  15:23:00           



                                                  CST,           



                                                  Height,           



                                                  107.301,           



                                                  kg,            



                                                  21           



                                                  15:23:00           



                                                  CST,           



                                                  Weight           

 

     atorvastati      2021      Yes                      10 mg = 1           M

[facility]



     n 10 mg      2-27                               tab, PO,           l



     oral tablet      21:45:                               Bedtime, #           

[place]



               00                                 90 tab, 0           



                                                  Refill(s),           



                                                  Pharmacy:           



                                                  [facility]           



                                                  #[id number],           



                                                  165.1, cm,           



                                                  21           



                                                  15:23:00           



                                                  CST,           



                                                  Height,           



                                                  107.301,           



                                                  kg,            



                                                  21           



                                                  15:23:00           



                                                  CST,           



                                                  Weight           

 

     atorvastati      2021      Yes                      10 mg = 1           M

[facility]



     n 10 mg      2-27                               tab, PO,           l



     oral tablet      21:45:                               Bedtime, #           

[place]



               00                                 90 tab, 0           



                                                  Refill(s),           



                                                  Pharmacy:           



                                                  [facility]           



                                                  #[id number],           



                                                  165.1, cm,           



                                                  21           



                                                  15:23:00           



                                                  CST,           



                                                  Height,           



                                                  107.301,           



                                                  kg,            



                                                  21           



                                                  15:23:00           



                                                  CST,           



                                                  Weight           

 

     atorvastati      2021      Yes                      10 mg = 1           M

[facility]



     n 10 mg      2-27                               tab, PO,           l



     oral tablet      21:45:                               Bedtime, #           

[place]



               00                                 90 tab, 0           



                                                  Refill(s),           



                                                  Pharmacy:           



                                                  [facility]           



                                                  #[id number],           



                                                  165.1, cm,           



                                                  21           



                                                  15:23:00           



                                                  CST,           



                                                  Height,           



                                                  107.301,           



                                                  kg,            



                                                  21           



                                                  15:23:00           



                                                  CST,           



                                                  Weight           

 

     atorvas2021      Yes                      10 mg = 1           M

[facility]



     n 10 mg      2-27                               tab, PO,           l



     oral tablet      21:45:                               Bedtime, #           

[place]



               00                                 90 tab, 0           



                                                  Refill(s),           



                                                  Pharmacy:           



                                                  [facility]           



                                                  #[id number],           



                                                  165.1, cm,           



                                                  21           



                                                  15:23:00           



                                                  CST,           



                                                  Height,           



                                                  107.301,           



                                                  kg,            



                                                  21           



                                                  15:23:00           



                                                  CST,           



                                                  Weight           

 

     atorvastati      2021      Yes                      10 mg = 1           M

[facility]



     n 10 mg      2-27                               tab, PO,           l



     oral tablet      21:45:                               Bedtime, #           

[place]



               00                                 90 tab, 0           



                                                  Refill(s),           



                                                  Pharmacy:           



                                                  [facility]           



                                                  #[id number],           



                                                  165.1, cm,           



                                                  21           



                                                  15:23:00           



                                                  CST,           



                                                  Height,           



                                                  107.301,           



                                                  kg,            



                                                  21           



                                                  15:23:00           



                                                  CST,           



                                                  Weight           

 

     atorvas2021      Yes                      10 mg = 1           M

[facility]



     n 10 mg      2-27                               tab, PO,           l



     oral tablet      21:45:                               Bedtime, #           

[place]



               00                                 90 tab, 0           



                                                  Refill(s),           



                                                  Pharmacy:           



                                                  [facility]           



                                                  #[id number],           



                                                  165.1, cm,           



                                                  21           



                                                  15:23:00           



                                                  CST,           



                                                  Height,           



                                                  107.301,           



                                                  kg,            



                                                  21           



                                                  15:23:00           



                                                  CST,           



                                                  Weight           

 

     atorvastati      2021      Yes                      10 mg = 1           M

[facility]



     n 10 mg      2-27                               tab, PO,           l



     oral tablet      21:45:                               Bedtime, #           

[place]



               00                                 90 tab, 0           



                                                  Refill(s),           



                                                  Pharmacy:           



                                                  [facility]           



                                                  #[id number],           



                                                  165.1, cm,           



                                                  21           



                                                  15:23:00           



                                                  CST,           



                                                  Height,           



                                                  107.301,           



                                                  kg,            



                                                  21           



                                                  15:23:00           



                                                  CST,           



                                                  Weight           

 

     atorvastati      2021      Yes                      10 mg = 1           M

[facility]



     n 10 mg      2-27                               tab, PO,           l



     oral tablet      21:45:                               Bedtime, #           

[place]



               00                                 90 tab, 0           



                                                  Refill(s),           



                                                  Pharmacy:           



                                                  [facility]           



                                                  #[id number],           



                                                  165.1, cm,           



                                                  21           



                                                  15:23:00           



                                                  CST,           



                                                  Height,           



                                                  107.301,           



                                                  kg,            



                                                  21           



                                                  15:23:00           



                                                  CST,           



                                                  Weight           

 

     atorvastati      2021      Yes                      10 mg = 1           M

[facility]



     n 10 mg      2-27                               tab, PO,           l



     oral tablet      21:45:                               Bedtime, #           

[place]



               00                                 90 tab, 0           



                                                  Refill(s),           



                                                  Pharmacy:           



                                                  [facility]           



                                                  #[id number],           



                                                  165.1, cm,           



                                                  21           



                                                  15:23:00           



                                                  CST,           



                                                  Height,           



                                                  107.301,           



                                                  kg,            



                                                  21           



                                                  15:23:00           



                                                  CST,           



                                                  Weight           

 

     tizanidine      2021      Yes                      4 mg = 1           [facility]

[facility]



     4 mg oral      2-27                               tab, PO,           l



     tablet      21:40:                               Bedtime,           [place]



               00                                 PRN for           



                                                  muscle           



                                                  spasm, #           



                                                  30 tab, 0           



                                                  Refill(s),           



                                                  Pharmacy:           



                                                  [facility]           



                                                  #[id number],           



                                                  165.1, cm,           



                                                  21           



                                                  15:23:00           



                                                  CST,           



                                                  Height,           



                                                  107.301,           



                                                  kg,            



                                                  21           



                                                  15:23:00           



                                                  CST,           



                                                  Weight           

 

     Acetaminoph      2021      Yes                      1 tab, PO,           

[facility]



     en 325 MG /      2-27                               Q12H, PRN           l



     tramadol      21:40:                               for pain,           [facility]

[facility]



     hydrochlori      00                                 X 15 day,           



     de 37.5 MG                                         # 30 tab,           



     Oral Tablet                                         0              



     [Ultracet]                                         Refill(s),           



                                                  Pharmacy:           



                                                  [facility]           



                                                  #[id number],           



                                                  165.1, cm,           



                                                  21           



                                                  15:23:00           



                                                  CST,           



                                                  Height,           



                                                  107.301,           



                                                  kg,            



                                                  21           



                                                  15:23:00           



                                                  CST,           



                                                  Weight           

 

     tizanidine      2021      Yes                      4 mg = 1           [facility]

[facility]



     4 mg oral      2-27                               tab, PO,           l



     tablet      21:40:                               Bedtime,           [place]



               00                                 PRN for           



                                                  muscle           



                                                  spasm, #           



                                                  30 tab, 0           



                                                  Refill(s),           



                                                  Pharmacy:           



                                                  [facility]           



                                                  #[id number],           



                                                  165.1, cm,           



                                                  21           



                                                  15:23:00           



                                                  CST,           



                                                  Height,           



                                                  107.301,           



                                                  kg,            



                                                  21           



                                                  15:23:00           



                                                  CST,           



                                                  Weight           

 

     Acetaminoph      2021      Yes                      1 tab, PO,           

[facility]



     en 325 MG /      2-27                               Q12H, PRN           l



     tramadol      21:40:                               for pain,           [facility]

[facility]



     hydrochlori      00                                 X 15 day,           



     de 37.5 MG                                         # 30 tab,           



     Oral Tablet                                         0              



     [Ultracet]                                         Refill(s),           



                                                  Pharmacy:           



                                                  [facility]           



                                                  #[id number],           



                                                  165.1, cm,           



                                                  21           



                                                  15:23:00           



                                                  CST,           



                                                  Height,           



                                                  107.301,           



                                                  kg,            



                                                  21           



                                                  15:23:00           



                                                  CST,           



                                                  Weight           

 

     tizanidine      2021      Yes                      4 mg = 1           [facility]

[facility]



     4 mg oral      2-27                               tab, PO,           l



     tablet      21:40:                               Bedtime,           [place]



               00                                 PRN for           



                                                  muscle           



                                                  spasm, #           



                                                  30 tab, 0           



                                                  Refill(s),           



                                                  Pharmacy:           



                                                  [facility]           



                                                  #[id number],           



                                                  165.1, cm,           



                                                  21           



                                                  15:23:00           



                                                  CST,           



                                                  Height,           



                                                  107.301,           



                                                  kg,            



                                                  21           



                                                  15:23:00           



                                                  CST,           



                                                  Weight           

 

     Acetaminoph      2021      Yes                      1 tab, PO,           

[facility]



     en 325 MG /      2-27                               Q12H, PRN           l



     tramadol      21:40:                               for pain,           [facility]

[facility]



     hydrochlori      00                                 X 15 day,           



     de 37.5 MG                                         # 30 tab,           



     Oral Tablet                                         0              



     [Ultracet]                                         Refill(s),           



                                                  Pharmacy:           



                                                  [facility]           



                                                  #[id number],           



                                                  165.1, cm,           



                                                  21           



                                                  15:23:00           



                                                  CST,           



                                                  Height,           



                                                  107.301,           



                                                  kg,            



                                                  21           



                                                  15:23:00           



                                                  CST,           



                                                  Weight           

 

     tizanidine      2021      Yes                      4 mg = 1           [facility]

[facility]



     4 mg oral      2-27                               tab, PO,           l



     tablet      21:40:                               Bedtime,           [place]



               00                                 PRN for           



                                                  muscle           



                                                  spasm, #           



                                                  30 tab, 0           



                                                  Refill(s),           



                                                  Pharmacy:           



                                                  [facility]           



                                                  #[id number],           



                                                  165.1, cm,           



                                                  21           



                                                  15:23:00           



                                                  CST,           



                                                  Height,           



                                                  107.301,           



                                                  kg,            



                                                  21           



                                                  15:23:00           



                                                  CST,           



                                                  Weight           

 

     Acetaminoph      2021      Yes                      1 tab, PO,           

[facility]



     en 325 MG /      2-27                               Q12H, PRN           l



     tramadol      21:40:                               for pain,           [facility]

[facility]



     hydrochlori      00                                 X 15 day,           



     de 37.5 MG                                         # 30 tab,           



     Oral Tablet                                         0              



     [Ultracet]                                         Refill(s),           



                                                  Pharmacy:           



                                                  [facility]           



                                                  [place] [facility]           



                                                  #[id number],           



                                                  165.1, cm,           



                                                  21           



                                                  15:23:00           



                                                  CST,           



                                                  Height,           



                                                  107.301,           



                                                  kg,            



                                                  21           



                                                  15:23:00           



                                                  CST,           



                                                  Weight           

 

     tizanidine      2021      Yes                      4 mg = 1           [facility]

[facility]



     4 mg oral      2-27                               tab, PO,           l



     tablet      21:40:                               Bedtime,           [place]



               00                                 PRN for           



                                                  muscle           



                                                  spasm, #           



                                                  30 tab, 0           



                                                  Refill(s),           



                                                  Pharmacy:           



                                                  [facility]           



                                                  #[id number],           



                                                  165.1, cm,           



                                                  21           



                                                  15:23:00           



                                                  CST,           



                                                  Height,           



                                                  107.301,           



                                                  kg,            



                                                  21           



                                                  15:23:00           



                                                  CST,           



                                                  Weight           

 

     Acetaminoph      2021      Yes                      1 tab, PO,           

[facility]



     en 325 MG /      2-27                               Q12H, PRN           l



     tramadol      21:40:                               for pain,           [facility]

[facility]



     hydrochlori      00                                 X 15 day,           



     de 37.5 MG                                         # 30 tab,           



     Oral Tablet                                         0              



     [Ultracet]                                         Refill(s),           



                                                  Pharmacy:           



                                                  [facility]           



                                                  #[id number],           



                                                  165.1, cm,           



                                                  21           



                                                  15:23:00           



                                                  CST,           



                                                  Height,           



                                                  107.301,           



                                                  kg,            



                                                  21           



                                                  15:23:00           



                                                  CST,           



                                                  Weight           

 

     tizanidine      2021      Yes                      4 mg = 1           [facility]

[facility]



     4 mg oral      2-27                               tab, PO,           l



     tablet      21:40:                               Bedtime,           [place]



               00                                 PRN for           



                                                  muscle           



                                                  spasm, #           



                                                  30 tab, 0           



                                                  Refill(s),           



                                                  Pharmacy:           



                                                  [facility]           



                                                  #[id number],           



                                                  165.1, cm,           



                                                  21           



                                                  15:23:00           



                                                  CST,           



                                                  Height,           



                                                  107.301,           



                                                  kg,            



                                                  21           



                                                  15:23:00           



                                                  CST,           



                                                  Weight           

 

     Acetaminoph      2021      Yes                      1 tab, PO,           

[facility]



     en 325 MG /      2-27                               Q12H, PRN           l



     tramadol      21:40:                               for pain,           [facility]

[facility]



     hydrochlori      00                                 X 15 day,           



     de 37.5 MG                                         # 30 tab,           



     Oral Tablet                                         0              



     [Ultracet]                                         Refill(s),           



                                                  Pharmacy:           



                                                  [facility]           



                                                  #[id number],           



                                                  165.1, cm,           



                                                  21           



                                                  15:23:00           



                                                  CST,           



                                                  Height,           



                                                  107.301,           



                                                  kg,            



                                                  21           



                                                  15:23:00           



                                                  CST,           



                                                  Weight           

 

     tizanidine      2021      Yes                      4 mg = 1           [facility]

[facility]



     4 mg oral      2-27                               tab, PO,           l



     tablet      21:40:                               Bedtime,           [place]



               00                                 PRN for           



                                                  muscle           



                                                  spasm, #           



                                                  30 tab, 0           



                                                  Refill(s),           



                                                  Pharmacy:           



                                                  [facility]           



                                                  #[id number],           



                                                  165.1, cm,           



                                                  21           



                                                  15:23:00           



                                                  CST,           



                                                  Height,           



                                                  107.301,           



                                                  kg,            



                                                  21           



                                                  15:23:00           



                                                  CST,           



                                                  Weight           

 

     Acetaminoph      2021      Yes                      1 tab, PO,           

[facility]



     en 325 MG /      2-27                               Q12H, PRN           l



     tramadol      21:40:                               for pain,           [facility]

[facility]



     hydrochlori      00                                 X 15 day,           



     de 37.5 MG                                         # 30 tab,           



     Oral Tablet                                         0              



     [Ultracet]                                         Refill(s),           



                                                  Pharmacy:           



                                                  [facility]           



                                                  #[id number],           



                                                  165.1, cm,           



                                                  21           



                                                  15:23:00           



                                                  CST,           



                                                  Height,           



                                                  107.301,           



                                                  kg,            



                                                  21           



                                                  15:23:00           



                                                  CST,           



                                                  Weight           

 

     tizanidine      2021      Yes                      4 mg = 1           [facility]

[facility]



     4 mg oral      2-27                               tab, PO,           l



     tablet      21:40:                               Bedtime,           [place]



               00                                 PRN for           



                                                  muscle           



                                                  spasm, #           



                                                  30 tab, 0           



                                                  Refill(s),           



                                                  Pharmacy:           



                                                  [facility]           



                                                  #[id number],           



                                                  165.1, cm,           



                                                  21           



                                                  15:23:00           



                                                  CST,           



                                                  Height,           



                                                  107.301,           



                                                  kg,            



                                                  21           



                                                  15:23:00           



                                                  CST,           



                                                  Weight           

 

     Acetaminoph      2021      Yes                      1 tab, PO,           

[facility]



     en 325 MG /      2-27                               Q12H, PRN           l



     tramadol      21:40:                               for pain,           [facility]

[facility]



     hydrochlori      00                                 X 15 day,           



     de 37.5 MG                                         # 30 tab,           



     Oral Tablet                                         0              



     [Ultracet]                                         Refill(s),           



                                                  Pharmacy:           



                                                  [facility]           



                                                  #[id number],           



                                                  165.1, cm,           



                                                  21           



                                                  15:23:00           



                                                  CST,           



                                                  Height,           



                                                  107.301,           



                                                  kg,            



                                                  21           



                                                  15:23:00           



                                                  CST,           



                                                  Weight           

 

     tizanidine      2021      Yes                      4 mg = 1           [facility]

[facility]



     4 mg oral      2-27                               tab, PO,           l



     tablet      21:40:                               Bedtime,           [place]



               00                                 PRN for           



                                                  muscle           



                                                  spasm, #           



                                                  30 tab, 0           



                                                  Refill(s),           



                                                  Pharmacy:           



                                                  [facility]           



                                                  #[id number],           



                                                  165.1, cm,           



                                                  21           



                                                  15:23:00           



                                                  CST,           



                                                  Height,           



                                                  107.301,           



                                                  kg,            



                                                  21           



                                                  15:23:00           



                                                  CST,           



                                                  Weight           

 

     Acetaminoph      2021      Yes                      1 tab, PO,           

[facility]



     en 325 MG /      2-27                               Q12H, PRN           l



     tramadol      21:40:                               for pain,           [facility]

[facility]



     hydrochlori      00                                 X 15 day,           



     de 37.5 MG                                         # 30 tab,           



     Oral Tablet                                         0              



     [Ultracet]                                         Refill(s),           



                                                  Pharmacy:           



                                                  [facility]           



                                                  #[id number],           



                                                  165.1, cm,           



                                                  21           



                                                  15:23:00           



                                                  CST,           



                                                  Height,           



                                                  107.301,           



                                                  kg,            



                                                  21           



                                                  15:23:00           



                                                  CST,           



                                                  Weight           

 

     tizanidine      2021      Yes                      4 mg = 1           [facility]

[facility]



     4 mg oral      2-27                               tab, PO,           l



     tablet      21:40:                               Bedtime,           [place]



               00                                 PRN for           



                                                  muscle           



                                                  spasm, #           



                                                  30 tab, 0           



                                                  Refill(s),           



                                                  Pharmacy:           



                                                  [facility]           



                                                  #[id number],           



                                                  165.1, cm,           



                                                  21           



                                                  15:23:00           



                                                  CST,           



                                                  Height,           



                                                  107.301,           



                                                  kg,            



                                                  21           



                                                  15:23:00           



                                                  CST,           



                                                  Weight           

 

     Acetaminoph      2021      Yes                      1 tab, PO,           

[facility]



     en 325 MG /      2-27                               Q12H, PRN           l



     tramadol      21:40:                               for pain,           [facility]

[facility]



     hydrochlori      00                                 X 15 day,           



     de 37.5 MG                                         # 30 tab,           



     Oral Tablet                                         0              



     [Ultracet]                                         Refill(s),           



                                                  Pharmacy:           



                                                  [facility]           



                                                  #[id number],           



                                                  165.1, cm,           



                                                  21           



                                                  15:23:00           



                                                  CST,           



                                                  Height,           



                                                  107.301,           



                                                  kg,            



                                                  21           



                                                  15:23:00           



                                                  CST,           



                                                  Weight           

 

     sevelamer      2021      Yes                      1,600 mg =           [facility]

[facility]



     800 mg oral      2-27                               2 tab, PO,           l



     tablet      21:21:                               TID-Meals,           [facility]

nn



               00                                 # 180 tab,           



                                                  0              



                                                  Refill(s)           

 

     sevelamer      2021      Yes                      1,600 mg =           Me

[facility]



     800 mg oral      2-27                               2 tab, PO,           l



     tablet      21:21:                               TID-Meals,           [facility]

[facility]



               00                                 # 180 tab,           



                                                  0              



                                                  Refill(s)           

 

     sevelamer      2021      Yes                      1,600 mg =           Me

[facility]



     800 mg oral      2-27                               2 tab, PO,           l



     tablet      21:21:                               TID-Meals,           [facility]

[facility]



               00                                 # 180 tab,           



                                                  0              



                                                  Refill(s)           

 

     sevelamer      2021      Yes                      1,600 mg =           Me

[facility]



     800 mg oral      2-27                               2 tab, PO,           l



     tablet      21:21:                               TID-Meals,           [facility]

[facility]



               00                                 # 180 tab,           



                                                  0              



                                                  Refill(s)           

 

     sevelamer      2021      Yes                      1,600 mg =           Me

[facility]



     800 mg oral      2-27                               2 tab, PO,           l



     tablet      21:21:                               TID-Meals,           [facility]

[facility]



               00                                 # 180 tab,           



                                                  0              



                                                  Refill(s)           

 

     sevelamer      2021      Yes                      1,600 mg =           Me

[facility]



     800 mg oral      2-27                               2 tab, PO,           l



     tablet      21:21:                               TID-Meals,           [facility]

[facility]



               00                                 # 180 tab,           



                                                  0              



                                                  Refill(s)           

 

     sevelamer      2021      Yes                      1,600 mg =           Me

[facility]



     800 mg oral      2-27                               2 tab, PO,           l



     tablet      21:21:                               TID-Meals,           [facility]

[facility]



               00                                 # 180 tab,           



                                                  0              



                                                  Refill(s)           

 

     sevelamer      2021      Yes                      1,600 mg =           Me

[facility]



     800 mg oral      2-27                               2 tab, PO,           l



     tablet      21:21:                               TID-Meals,           [facility]

[facility]



               00                                 # 180 tab,           



                                                  0              



                                                  Refill(s)           

 

     sevelamer      2021      Yes                      1,600 mg =           Me

[facility]



     800 mg oral      2-27                               2 tab, PO,           l



     tablet      21:21:                               TID-Meals,           [facility]

[facility]



               00                                 # 180 tab,           



                                                  0              



                                                  Refill(s)           

 

     sevelamer      2021      Yes                      1,600 mg =           Me

[facility]



     800 mg oral      2-27                               2 tab, PO,           l



     tablet      21:21:                               TID-Meals,           [facility]

[facility]



               00                                 # 180 tab,           



                                                  0              



                                                  Refill(s)           

 

     Mupirocin      2021      Yes                      1 appl,           [facility]

[facility]



     0.02 MG/MG      1-22                               TOP, TID,           l



     Topical      23:21:                               X 5 day, #           [facility]

[facility]



     Ointment      00                                 22 gm, 0           



                                                  Refill(s),           



                                                  Pharmacy:           



                                                  [facility]           



                                                  #[id number],           



                                                  165.1, cm,           



                                                  21           



                                                  14:08:00           



                                                  CST,           



                                                  Height,           



                                                  136.42,           



                                                  kg,            



                                                  21           



                                                  14:08:00           



                                                  CST,           



                                                  Weight           

 

     Mupirocin      2021      Yes                      1 appl,           [facility]

[facility]



     0.02 MG/MG      1-22                               TOP, TID,           l



     Topical      23:21:                               X 5 day, #           [facility]

[facility]



     Ointment      00                                 22 gm, 0           



                                                  Refill(s),           



                                                  Pharmacy:           



                                                  [facility]           



                                                  #[id number],           



                                                  165.1, cm,           



                                                  21           



                                                  14:08:00           



                                                  CST,           



                                                  Height,           



                                                  136.42,           



                                                  kg,            



                                                  21           



                                                  14:08:00           



                                                  CST,           



                                                  Weight           

 

     Mupirocin      2021      Yes                      1 appl,           [facility]

[facility]



     0.02 MG/MG      1-22                               TOP, TID,           l



     Topical      23:21:                               X 5 day, #           [facility]

[facility]



     Ointment      00                                 22 gm, 0           



                                                  Refill(s),           



                                                  Pharmacy:           



                                                  [facility]           



                                                  #[id number],           



                                                  165.1, cm,           



                                                  21           



                                                  14:08:00           



                                                  CST,           



                                                  Height,           



                                                  136.42,           



                                                  kg,            



                                                  21           



                                                  14:08:00           



                                                  CST,           



                                                  Weight           

 

     Mupirocin      2021      Yes                      1 appl,           [facility]

[facility]



     0.02 MG/MG      1-22                               TOP, TID,           l



     Topical      23:21:                               X 5 day, #           [facility]

[facility]



     Ointment      00                                 22 gm, 0           



                                                  Refill(s),           



                                                  Pharmacy:           



                                                  [facility]           



                                                  #[id number],           



                                                  165.1, cm,           



                                                  21           



                                                  14:08:00           



                                                  CST,           



                                                  Height,           



                                                  136.42,           



                                                  kg,            



                                                  21           



                                                  14:08:00           



                                                  CST,           



                                                  Weight           

 

     Mupirocin      2021      Yes                      1 appl,           [facility]

[facility]



     0.02 MG/MG      1-22                               TOP, TID,           l



     Topical      23:21:                               X 5 day, #           [facility]

d[facility]



     Ointment      00                                 22 gm, 0           



                                                  Refill(s),           



                                                  Pharmacy:           



                                                  [facility]           



                                                  #[id number],           



                                                  165.1, cm,           



                                                  21           



                                                  14:08:00           



                                                  CST,           



                                                  Height,           



                                                  136.42,           



                                                  kg,            



                                                  21           



                                                  14:08:00           



                                                  CST,           



                                                  Weight           

 

     Mupirocin      -      Yes                      1 appl,           [facility]

[facility]



     0.02 MG/MG      1-22                               TOP, TID,           l



     Topical      23:21:                               X 5 day, #           [facility]

[facility]



     Ointment      00                                 22 gm, 0           



                                                  Refill(s),           



                                                  Pharmacy:           



                                                  [facility]           



                                                  #[id number],           



                                                  165.1, cm,           



                                                  21           



                                                  14:08:00           



                                                  CST,           



                                                  Height,           



                                                  136.42,           



                                                  kg,            



                                                  21           



                                                  14:08:00           



                                                  CST,           



                                                  Weight           

 

     Mupirocin      -      Yes                      1 appl,           [facility]

[facility]



     0.02 MG/MG      1-22                               TOP, TID,           l



     Topical      23:21:                               X 5 day, #           [facility]

d[facility]



     Ointment      00                                 22 gm, 0           



                                                  Refill(s),           



                                                  Pharmacy:           



                                                  [facility]           



                                                  #[id number],           



                                                  165.1, cm,           



                                                  21           



                                                  14:08:00           



                                                  CST,           



                                                  Height,           



                                                  136.42,           



                                                  kg,            



                                                  21           



                                                  14:08:00           



                                                  CST,           



                                                  Weight           

 

     Mupirocin      2021      Yes                      1 appl,           [facility]

[facility]



     0.02 MG/MG      1-22                               TOP, TID,           l



     Topical      23:21:                               X 5 day, #           [facility]

[facility]



     Ointment      00                                 22 gm, 0           



                                                  Refill(s),           



                                                  Pharmacy:           



                                                  [facility]           



                                                  #[id number],           



                                                  165.1, cm,           



                                                  21           



                                                  14:08:00           



                                                  CST,           



                                                  Height,           



                                                  136.42,           



                                                  kg,            



                                                  21           



                                                  14:08:00           



                                                  CST,           



                                                  Weight           

 

     Mupirocin      2021      Yes                      1 appl,           [facility]

[facility]



     0.02 MG/MG      1-22                               TOP, TID,           l



     Topical      23:21:                               X 5 day, #           [facility]

d[facility]



     Ointment      00                                 22 gm, 0           



                                                  Refill(s),           



                                                  Pharmacy:           



                                                  [facility]           



                                                  #[id number],           



                                                  165.1, cm,           



                                                  21           



                                                  14:08:00           



                                                  CST,           



                                                  Height,           



                                                  136.42,           



                                                  kg,            



                                                  21           



                                                  14:08:00           



                                                  CST,           



                                                  Weight           

 

     Mupirocin      -      Yes                      1 appl,           [facility]

[facility]



     0.02 MG/MG      1-22                               TOP, TID,           l



     Topical      23:21:                               X 5 day, #           [facility]

[facility]



     Ointment      00                                 22 gm, 0           



                                                  Refill(s),           



                                                  Pharmacy:           



                                                  [facility]           



                                                  [facility]           



                                                  #[id number],           



                                                  165.1, cm,           



                                                  21           



                                                  14:08:00           



                                                  CST,           



                                                  Height,           



                                                  136.42,           



                                                  kg,            



                                                  21           



                                                  14:08:00           



                                                  CST,           



                                                  Weight           

 

     bumetanide      2021      Yes                      2 mg = 1           [facility]

[facility]



     2 mg oral      1-19                               tab, PO,           l



     tablet      21:11:                               Daily, #           [place]



               00                                 30 tab, 0           



                                                  Refill(s)           

 

     bumetanide      2021      Yes                      2 mg = 1           [facility]

[facility]



     2 mg oral      1-19                               tab, PO,           l



     tablet      21:11:                               Daily, #           [place]



               00                                 30 tab, 0           



                                                  Refill(s)           

 

     bumetanide      2021      Yes                      2 mg = 1           [facility]

[facility]



     2 mg oral      1-19                               tab, PO,           l



     tablet      21:11:                               Daily, #           [place]



               00                                 30 tab, 0           



                                                  Refill(s)           

 

     bumetanide      2021      Yes                      2 mg = 1           [facility]

[facility]



     2 mg oral      1-19                               tab, PO,           l



     tablet      21:11:                               Daily, #           [place]



               00                                 30 tab, 0           



                                                  Refill(s)           

 

     bumetanide      2021      Yes                      2 mg = 1           [facility]

[facility]



     2 mg oral      1-19                               tab, PO,           l



     tablet      21:11:                               Daily, #           [place]



               00                                 30 tab, 0           



                                                  Refill(s)           

 

     bumetanide      2021      Yes                      2 mg = 1           [facility]

[facility]



     2 mg oral      1-19                               tab, PO,           l



     tablet      21:11:                               Daily, #           [facility]



               00                                 30 tab, 0           



                                                  Refill(s)           

 

     bumetanide      2021      Yes                      2 mg = 1           [facility]

[facility]



     2 mg oral      1-19                               tab, PO,           l



     tablet      21:11:                               Daily, #           [place]



               00                                 30 tab, 0           



                                                  Refill(s)           

 

     bumetanide      2021      Yes                      2 mg = 1           [facility]

[facility]



     2 mg oral      1-19                               tab, PO,           l



     tablet      21:11:                               Daily, #           [place]



               00                                 30 tab, 0           



                                                  Refill(s)           

 

     bumetanide      2021      Yes                      2 mg = 1           [facility]

[facility]



     2 mg oral      1-19                               tab, PO,           l



     tablet      21:11:                               Daily, #           [place]



               00                                 30 tab, 0           



                                                  Refill(s)           

 

     bumetanide      2021      Yes                      2 mg = 1           [facility]

[facility]



     2 mg oral      1-19                               tab, PO,           l



     tablet      21:11:                               Daily, #           [facility]



               00                                 30 tab, 0           



                                                  Refill(s)           

 

     allopurinol      2021      Yes                      100 mg = 1           

[facility]



     100 mg oral      1-19                               tab, PO,           l



     tablet      21:10:                               BID, # 60           [facility]

n



               00                                 tab, 0           



                                                  Refill(s)           

 

     gabapentin      2021      Yes                      200 mg = 2           [facility]

[facility]



     100 MG Oral      1-19                               cap, PO,           l



     Capsule      21:10:                               Bedtime, 0           [facility]

[facility]



               00                                 Refill(s)           

 

     allopurinol      2021      Yes                      100 mg = 1           

[facility]



     100 mg oral      1-19                               tab, PO,           l



     tablet      21:10:                               BID, # 60           [facility]

n



               00                                 tab, 0           



                                                  Refill(s)           

 

     gabapentin      2021      Yes                      200 mg = 2           [facility]

[facility]



     100 MG Oral      1-19                               cap, PO,           l



     Capsule      21:10:                               Bedtime, 0           [facility]

[facility]



               00                                 Refill(s)           

 

     allopurinol      2021      Yes                      100 mg = 1           

[facility]



     100 mg oral      1-19                               tab, PO,           l



     tablet      21:10:                               BID, # 60           [facility]

n



               00                                 tab, 0           



                                                  Refill(s)           

 

     gabapentin      2021      Yes                      200 mg = 2           [facility]

[facility]



     100 MG Oral      1-19                               cap, PO,           l



     Capsule      21:10:                               Bedtime, 0           [facility]

[facility]



               00                                 Refill(s)           

 

     allopurinol      2021      Yes                      100 mg = 1           

[facility]



     100 mg oral      1-19                               tab, PO,           l



     tablet      21:10:                               BID, # 60           [facility]

n



               00                                 tab, 0           



                                                  Refill(s)           

 

     gabapentin      2021      Yes                      200 mg = 2           [facility]

[facility]



     100 MG Oral      1-19                               cap, PO,           l



     Capsule      21:10:                               Bedtime, 0           [facility]

[facility]



               00                                 Refill(s)           

 

     allopurinol      2021      Yes                      100 mg = 1           

[facility]



     100 mg oral      1-19                               tab, PO,           l



     tablet      21:10:                               BID, # 60           [facility]

n



               00                                 tab, 0           



                                                  Refill(s)           

 

     gabapentin      2021      Yes                      200 mg = 2           [facility]

[facility]



     100 MG Oral      1-19                               cap, PO,           l



     Capsule      21:10:                               Bedtime, 0           [facility]

[facility]



               00                                 Refill(s)           

 

     allopurinol      2021      Yes                      100 mg = 1           

[facility]



     100 mg oral      1-19                               tab, PO,           l



     tablet      21:10:                               BID, # 60           [facility]

n



               00                                 tab, 0           



                                                  Refill(s)           

 

     gabapentin      2021      Yes                      200 mg = 2           [facility]

[facility]



     100 MG Oral      1-19                               cap, PO,           l



     Capsule      21:10:                               Bedtime, 0           [facility]

[facility]



               00                                 Refill(s)           

 

     allopurinol      2021      Yes                      100 mg = 1           

[facility]



     100 mg oral      1-19                               tab, PO,           l



     tablet      21:10:                               BID, # 60           [facility]

n



               00                                 tab, 0           



                                                  Refill(s)           

 

     gabapentin      2021      Yes                      200 mg = 2           [facility]

[facility]



     100 MG Oral      1-19                               cap, PO,           l



     Capsule      21:10:                               Bedtime, 0           [facility]

[facility]



               00                                 Refill(s)           

 

     allopurinol      2021      Yes                      100 mg = 1           

[facility]



     100 mg oral      1-19                               tab, PO,           l



     tablet      21:10:                               BID, # 60           [facility]

n



               00                                 tab, 0           



                                                  Refill(s)           

 

     gabapentin      2021      Yes                      200 mg = 2           [facility]

[facility]



     100 MG Oral      1-19                               cap, PO,           l



     Capsule      21:10:                               Bedtime, 0           [facility]

[facility]



               00                                 Refill(s)           

 

     allopurinol      2021      Yes                      100 mg = 1           

[facility]



     100 mg oral      1-19                               tab, PO,           l



     tablet      21:10:                               BID, # 60           [facility]

n



               00                                 tab, 0           



                                                  Refill(s)           

 

     gabapentin      2021      Yes                      200 mg = 2           [facility]

[facility]



     100 MG Oral      1-19                               cap, PO,           l



     Capsule      21:10:                               Bedtime, 0           [facility]

[facility]



               00                                 Refill(s)           

 

     allopurinol      2021      Yes                      100 mg = 1           

[facility]



     100 mg oral      1-19                               tab, PO,           l



     tablet      21:10:                               BID, # 60           [facility]

n



               00                                 tab, 0           



                                                  Refill(s)           

 

     gabapentin      2021      Yes                      200 mg = 2           [facility]

[facility]



     100 MG Oral      1-19                               cap, PO,           l



     Capsule      21:10:                               Bedtime, 0           [facility]

[facility]



               00                                 Refill(s)           

 

     midodrine      2021      Yes            10mg [id number] Take 10 mg       

    [facility]



     (PROAMATINE      1-13                          [id number] by mouth 3       

    st



     ) 10 MG      00:00:                          3D   (three)           [facility]



     tablet      00                                 times a           l



                                                  day.           

 

     midodrine      2021      Yes            10mg Q.[id number] Take 10 mg       

    [facility]



     (PROAMATINE      1-13                          [id number] by mouth 3       

    st



     ) 10 MG      00:00:                          3D   (three)           [facility]



     tablet      00                                 times a           l



                                                  day.           

 

     midodrine      2021      Yes            10mg Q.[id number] Take 10 mg       

    [facility]



     (PROAMATINE      1-13                          [id number] by mouth 3       

    st



     ) 10 MG      00:00:                          3D   (three)           [facility]



     tablet      00                                 times a           l



                                                  day.           

 

     BUMETanide      2021      Yes            2mg  QD   Take 2 mg           [facility]

[facility]



     (BUMEX) 2      1-12                               by mouth           st



     MG tablet      00:00:                               every           [facility]



               00                                 morning.           l

 

     digOXIN      2021      Yes            125ug Q.[id number] Take 125          

 [facility]



     (LANOXIN)      1-12                          [id number] mcg by           st



     125 mcg      00:00:                          3W   mouth 3           [facility]



     (0.125 mg)      00                                 (three)           l



     tablet                                         times a           



                                                  week. On           



                                                  dialysis           



                                                  ,             



                                                  Thursday,           



                                                  Saturday           

 

     BUMETanide      2021      Yes            2mg  QD   Take 2 mg           [facility]

[facility]



     (BUMEX) 2      -12                               by mouth           st



     MG tablet      00:00:                               every           [facility]



               00                                 morning.           l

 

     digOXIN      2021      Yes            125ug Q.[id number] Take 125          

 [facility]



     (LANOXIN)      1-12                          [id number] mcg by           st



     125 mcg      00:00:                          3W   mouth 3           [facility]



     (0.125 mg)      00                                 (three)           l



     tablet                                         times a           



                                                  week. On           



                                                  dialysis           



                                                  ,             



                                                  Thursday,           



                                                  Saturday           

 

     BUMETanide      2021      Yes            2mg  QD   Take 2 mg           [facility]

[facility]



     (BUMEX) 2      -12                               by mouth           st



     MG tablet      00:00:                               every           [facility]



               00                                 morning.           l

 

     digOXIN      2021      Yes            125ug Q.[id number] Take 125          

 [facility]



     (LANOXIN)      1-12                          [id number] mcg by           st



     125 mcg      00:00:                          3W   mouth 3           [facility]



     (0.125 mg)      00                                 (three)           l



     tablet                                         times a           



                                                  week. On           



                                                  dialysis           



                                                  ,             



                                                  Thursday,           



                                                  Saturday           

 

     carvediloL      2021- No                                      [facility]

[facility]



     (COREG)                                               st



     3.125 MG      00:00: 00:00                                         [facility]



     tablet      00   :00                                          l

 

     carvediloL      2021- No                                      [facility]

[facility]



     (COREG)                                               st



     3.125 MG      00:00: 00:00                                         [facility]



     tablet      00   :00                                          l

 

     carvediloL      2021- No                                      [facility]

i



     (COREG)                                               st



     3.125 MG      00:00: 00:00                                         [facility]



     tablet      00   :00                                          l

 

     ipratropium      2021- No        [id number] .5mg Q.25D Take 2.5      

     [facility]



     (ATROVENT)      0-05 02-28                          mL (0.5 mg           st



     0.02 %      00:00: 00:00                          total) by           [facility]

ta



     nebulizer      00   :00                           nebulizati           l



     solution                                         on 4           



                                                  (four)           



                                                  times a           



                                                  day.           

 

     ipratropium      2021- No        [id number] .5mg Q.25D Take 2.5      

     [facility]



     (ATROVENT)      0-05 02-28                          mL (0.5 mg           st



     0.02 %      00:00: 00:00                          total) by           [facility]

ta



     nebulizer      00   :00                           nebulizati           l



     solution                                         on 4           



                                                  (four)           



                                                  times a           



                                                  day.           

 

     ipratropium      2021- No        [id number] .5mg Q.25D Take 2.5      

     [facility]



     (ATROVENT)      0-05 02-28                          mL (0.5 mg           st



     0.02 %      00:00: 00:00                          total) by           [facility]

ta



     nebulizer      00   :00                           nebulizati           l



     solution                                         on 4           



                                                  (four)           



                                                  times a           



                                                  day.           

 

     QUEtiapine      -0      Yes                      1 tablet,           [facility]

[facility]



     50 mg oral      3-30                               once a           l



     tablet      13:55:                               day, 0           [facility]



               00                                 Refill(s)           

 

     torsemide      -0      Yes                      1 tablet,           [facility]

[facility]



     20 mg oral      3-30                               twice a           l



     tablet      13:55:                               day, 0           [facility]



               00                                 Refill(s)           

 

     QUEtiapine      -0      Yes                      1 tablet,           [facility]

[facility]



     50 mg oral      3-30                               once a           l



     tablet      13:55:                               day, 0           [place]



               00                                 Refill(s)           

 

     torsemide      -0      Yes                      1 tablet,           [facility]

[facility]



     20 mg oral      3-30                               twice a           l



     tablet      13:55:                               day, 0           [place]



               00                                 Refill(s)           

 

     QUEtiapine      -0      Yes                      1 tablet,           [facility]

[facility]



     50 mg oral      3-30                               once a           l



     tablet      13:55:                               day, 0           [place]



               00                                 Refill(s)           

 

     torsemide      2021-0      Yes                      1 tablet,           [facility]

[facility]



     20 mg oral      3-30                               twice a           l



     tablet      13:55:                               day, 0           [facility]



               00                                 Refill(s)           

 

     QUEtiapine      2021-0      Yes                      1 tablet,           [facility]

[facility]



     50 mg oral      3-30                               once a           l



     tablet      13:55:                               day, 0           [place]



               00                                 Refill(s)           

 

     torsemide      2021-0      Yes                      1 tablet,           [facility]



     20 mg oral      3-30                               twice a           l



     tablet      13:55:                               day, 0           [place]



               00                                 Refill(s)           

 

     QUEtiapine      2021-0      Yes                      1 tablet,           [facility]

[facility]



     50 mg oral      3-30                               once a           l



     tablet      13:55:                               day, 0           [place]



               00                                 Refill(s)           

 

     torsemide      2021-0      Yes                      1 tablet,           [facility]

[facility]



     20 mg oral      3-30                               twice a           l



     tablet      13:55:                               day, 0           [facility]



               00                                 Refill(s)           

 

     QUEtiapine      2021-0      Yes                      1 tablet,           [facility]

[facility]



     50 mg oral      3-30                               once a           l



     tablet      13:55:                               day, 0           [facility]



               00                                 Refill(s)           

 

     torsemide      2021-0      Yes                      1 tablet,           [facility]

[facility]



     20 mg oral      3-30                               twice a           l



     tablet      13:55:                               day, 0           [place]



               00                                 Refill(s)           

 

     QUEtiapine      2021-0      Yes                      1 tablet,           [facility]

[facility]



     50 mg oral      3-30                               once a           l



     tablet      13:55:                               day, 0           [place]



               00                                 Refill(s)           

 

     torsemide      2021-0      Yes                      1 tablet,           [facility]

[facility]



     20 mg oral      3-30                               twice a           l



     tablet      13:55:                               day, 0           [place]



               00                                 Refill(s)           

 

     QUEtiapine      2021-0      Yes                      1 tablet,           [facility]

[facility]



     50 mg oral      3-30                               once a           l



     tablet      13:55:                               day, 0           [place]



               00                                 Refill(s)           

 

     torsemide      2021-0      Yes                      1 tablet,           [facility]



     20 mg oral      3-30                               twice a           l



     tablet      13:55:                               day, 0           [facility]



               00                                 Refill(s)           

 

     QUEtiapine      2021-0      Yes                      1 tablet,           [facility]

[facility]



     50 mg oral      3-30                               once a           l



     tablet      13:55:                               day, 0           [facility]



               00                                 Refill(s)           

 

     torsemide      2021-0      Yes                      1 tablet,           [facility]

[facility]



     20 mg oral      3-30                               twice a           l



     tablet      13:55:                               day, 0           [place]



               00                                 Refill(s)           

 

     QUEtiapine      -0      Yes                      1 tablet,           [facility]

[facility]



     50 mg oral      3-30                               once a           l



     tablet      13:55:                               day, 0           [place]



               00                                 Refill(s)           

 

     torsemide      -0      Yes                      1 tablet,           [facility]

[facility]



     20 mg oral      3-30                               twice a           l



     tablet      13:55:                               day, 0           [place]



               00                                 Refill(s)           

 

     potassium      -0      Yes                      1 tablet,           [facility]

[facility]



     chloride 20      3-30                               once a           l



     mEq oral      13:54:                               day, 0           [facility]



     tablet,      00                                 Refill(s)           



     extended                                                        



     release                                                        



     (KCL)                                                        

 

     potassium      -0      Yes                      1 tablet,           [facility]

[facility]



     chloride 20      3-30                               once a           l



     mEq oral      13:54:                               day, 0           [place]



     tablet,      00                                 Refill(s)           



     extended                                                        



     release                                                        



     (KCL)                                                        

 

     potassium      -0      Yes                      1 tablet,           [facility]

[facility]



     chloride 20      3-30                               once a           l



     mEq oral      13:54:                               day, 0           [facility]



     tablet,      00                                 Refill(s)           



     extended                                                        



     release                                                        



     (KCL)                                                        

 

     potassium      -0      Yes                      1 tablet,           [facility]

[facility]



     chloride 20      3-30                               once a           l



     mEq oral      13:54:                               day, 0           [place]



     tablet,      00                                 Refill(s)           



     extended                                                        



     release                                                        



     (KCL)                                                        

 

     potassium      -0      Yes                      1 tablet,           [facility]

[facility]



     chloride 20      3-30                               once a           l



     mEq oral      13:54:                               day, 0           [place]



     tablet,      00                                 Refill(s)           



     extended                                                        



     release                                                        



     (KCL)                                                        

 

     potassium      -0      Yes                      1 tablet,           [facility]

[facility]



     chloride 20      3-30                               once a           l



     mEq oral      13:54:                               day, 0           [facility]



     tablet,      00                                 Refill(s)           



     extended                                                        



     release                                                        



     (KCL)                                                        

 

     potassium      -0      Yes                      1 tablet,           [facility]

[facility]



     chloride 20      3-30                               once a           l



     mEq oral      13:54:                               day, 0           [place]



     tablet,      00                                 Refill(s)           



     extended                                                        



     release                                                        



     (KCL)                                                        

 

     potassium      -0      Yes                      1 tablet,           [facility]

[facility]



     chloride 20      3-30                               once a           l



     mEq oral      13:54:                               day, 0           [facility]



     tablet,      00                                 Refill(s)           



     extended                                                        



     release                                                        



     (KCL)                                                        

 

     potassium      -0      Yes                      1 tablet,           [facility]

[facility]



     chloride 20      3-30                               once a           l



     mEq oral      13:54:                               day, 0           [facility]



     tablet,      00                                 Refill(s)           



     extended                                                        



     release                                                        



     (KCL)                                                        

 

     potassium      -0      Yes                      1 tablet,           [facility]

[facility]



     chloride 20      3-30                               once a           l



     mEq oral      13:54:                               day, 0           [facility]



     tablet,      00                                 Refill(s)           



     extended                                                        



     release                                                        



     (KCL)                                                        

 

     allopurinol            Yes                      1/2            [facility]

[facility]



     300 mg oral      3-30                               tablet,           l



     tablet      13:53:                               once a           [place]



               00                                 day, 0           



                                                  Refill(s)           

 

     carvedilol      0      Yes                      1 tablet,           [facility]

[facility]



     3.125 mg      3-30                               twice a           l



     oral tablet      13:53:                               day, 0           [facility]

[facility]



               00                                 Refill(s)           

 

     Digoxin            Yes                      1 tablet,           [facility]

[facility]



     0.125 MG      3-30                               once a           l



     Oral Tablet      13:53:                               day, 0           [facility]

[facility]



               00                                 Refill(s)           

 

     DULoxetine            Yes                      1 capsule,           [facility]

[facility]



     60 mg oral      3-30                               once a           l



     delayed      13:53:                               day, 0           [facility]



     release      00                                 Refill(s)           



     capsule                                                        

 

     apixaban 5            Yes                      1 tablet,           [facility]

[facility]



     MG Oral      3-30                               twice a           l



     Tablet      13:53:                               day, 0           [facility]



     [Eliquis]      00                                 Refill(s)           

 

     gabapentin      0      Yes                      1 capsule,           [facility]

[facility]



     300 MG Oral      3-30                               twice a           l



     Capsule      13:53:                               day, 0           [facility]



               00                                 Refill(s)           

 

     metoprolol            Yes                      1 tablet,           [facility]

[facility]



     tartrate 50      3-30                               Twice a           l



     mg oral      13:53:                               day, 0           [place]



     tablet      00                                 Refill(s)           

 

     midodrine 5            Yes                      1 tablet,           [facility]

[facility]



     mg oral      3-30                               twice a           l



     tablet      13:53:                               day, 0           [facility]



               00                                 Refill(s)           

 

     allopurinol            Yes                      1/2            [facility]

[facility]



     300 mg oral      3-30                               tablet,           l



     tablet      13:53:                               once a           [facility]



               00                                 day, 0           



                                                  Refill(s)           

 

     carvedilol            Yes                      1 tablet,           [facility]

[facility]



     3.125 mg      3-30                               twice a           l



     oral tablet      13:53:                               day, 0           [facility]

[facility]



               00                                 Refill(s)           

 

     Digoxin      0      Yes                      1 tablet,           [facility]

[facility]



     0.125 MG      3-30                               once a           l



     Oral Tablet      13:53:                               day, 0           [facility]

[facility]



               00                                 Refill(s)           

 

     DULoxetine      0      Yes                      1 capsule,           [facility]

[facility]



     60 mg oral      3-30                               once a           l



     delayed      13:53:                               day, 0           [place]



     release      00                                 Refill(s)           



     capsule                                                        

 

     apixaban 5      0      Yes                      1 tablet,           [facility]

[facility]



     MG Oral      3-30                               twice a           l



     Tablet      13:53:                               day, 0           [facility]



     [Eliquis]      00                                 Refill(s)           

 

     gabapentin      0      Yes                      1 capsule,           [facility]

[facility]



     300 MG Oral      3-30                               twice a           l



     Capsule      13:53:                               day, 0           [facility]



               00                                 Refill(s)           

 

     metoprolol      0      Yes                      1 tablet,           [facility]

[facility]



     tartrate 50      3-30                               Twice a           l



     mg oral      13:53:                               day, 0           [facility]



     tablet      00                                 Refill(s)           

 

     midodrine 5            Yes                      1 tablet,           [facility]

[facility]



     mg oral      3-30                               twice a           l



     tablet      13:53:                               day, 0           [place]



               00                                 Refill(s)           

 

     allopurinol            Yes                      1/2            [facility]

[facility]



     300 mg oral      3-30                               tablet,           l



     tablet      13:53:                               once a           [place]



               00                                 day, 0           



                                                  Refill(s)           

 

     carvedilol            Yes                      1 tablet,           [facility]

[facility]



     3.125 mg      3-30                               twice a           l



     oral tablet      13:53:                               day, 0           [facility]

[facility]



               00                                 Refill(s)           

 

     Digoxin            Yes                      1 tablet,           [facility]

[facility]



     0.125 MG      3-30                               once a           l



     Oral Tablet      13:53:                               day, 0           [facility]

[facility]



               00                                 Refill(s)           

 

     DULoxetine            Yes                      1 capsule,           [facility]

[facility]



     60 mg oral      3-30                               once a           l



     delayed      13:53:                               day, 0           [facility]



     release      00                                 Refill(s)           



     capsule                                                        

 

     apixaban 5            Yes                      1 tablet,           [facility]

[facility]



     MG Oral      3-30                               twice a           l



     Tablet      13:53:                               day, 0           [place]



     [Eliquis]      00                                 Refill(s)           

 

     gabapentin      0      Yes                      1 capsule,           [facility]

[facility]



     300 MG Oral      3-30                               twice a           l



     Capsule      13:53:                               day, 0           [place]



               00                                 Refill(s)           

 

     metoprolol      0      Yes                      1 tablet,           [facility]

[facility]



     tartrate 50      3-30                               Twice a           l



     mg oral      13:53:                               day, 0           [place]



     tablet      00                                 Refill(s)           

 

     midodrine 5            Yes                      1 tablet,           [facility]

[facility]



     mg oral      3-30                               twice a           l



     tablet      13:53:                               day, 0           [place]



               00                                 Refill(s)           

 

     allopurinol      -0      Yes                      1/2            [facility]

[facility]



     300 mg oral      3-30                               tablet,           l



     tablet      13:53:                               once a           [place]



               00                                 day, 0           



                                                  Refill(s)           

 

     carvedilol      0      Yes                      1 tablet,           [facility]

[facility]



     3.125 mg      3-30                               twice a           l



     oral tablet      13:53:                               day, 0           [facility]

[facility]



               00                                 Refill(s)           

 

     Digoxin      0      Yes                      1 tablet,           [facility]

[facility]



     0.125 MG      3-30                               once a           l



     Oral Tablet      13:53:                               day, 0           [facility]

[facility]



               00                                 Refill(s)           

 

     DULoxetine            Yes                      1 capsule,           [facility]

[facility]



     60 mg oral      3-30                               once a           l



     delayed      13:53:                               day, 0           [facility]



     release      00                                 Refill(s)           



     capsule                                                        

 

     apixaban 5            Yes                      1 tablet,           [facility]

[facility]



     MG Oral      3-30                               twice a           l



     Tablet      13:53:                               day, 0           [facility]



     [Eliquis]      00                                 Refill(s)           

 

     gabapentin            Yes                      1 capsule,           [facility]

[facility]



     300 MG Oral      3-30                               twice a           l



     Capsule      13:53:                               day, 0           [facility]



               00                                 Refill(s)           

 

     metoprolol      0      Yes                      1 tablet,           [facility]

[facility]



     tartrate 50      3-30                               Twice a           l



     mg oral      13:53:                               day, 0           [facility]



     tablet      00                                 Refill(s)           

 

     midodrine 5            Yes                      1 tablet,           [facility]

[facility]



     mg oral      3-30                               twice a           l



     tablet      13:53:                               day, 0           [facility]



               00                                 Refill(s)           

 

     allopurinol            Yes                      1/2            [facility]

[facility]



     300 mg oral      3-30                               tablet,           l



     tablet      13:53:                               once a           [facility]



               00                                 day, 0           



                                                  Refill(s)           

 

     carvedilol      0      Yes                      1 tablet,           [facility]

[facility]



     3.125 mg      3-30                               twice a           l



     oral tablet      13:53:                               day, 0           [facility]

[facility]



               00                                 Refill(s)           

 

     Digoxin      -0      Yes                      1 tablet,           [facility]

[facility]



     0.125 MG      3-30                               once a           l



     Oral Tablet      13:53:                               day, 0           [facility]

[facility]



               00                                 Refill(s)           

 

     DULoxetine      0      Yes                      1 capsule,           [facility]

[facility]



     60 mg oral      3-30                               once a           l



     delayed      13:53:                               day, 0           [place]



     release      00                                 Refill(s)           



     capsule                                                        

 

     apixaban 5      0      Yes                      1 tablet,           [facility]

[facility]



     MG Oral      3-30                               twice a           l



     Tablet      13:53:                               day, 0           [facility]



     [Eliquis]      00                                 Refill(s)           

 

     gabapentin      -0      Yes                      1 capsule,           [facility]

[facility]



     300 MG Oral      3-30                               twice a           l



     Capsule      13:53:                               day, 0           [place]



               00                                 Refill(s)           

 

     metoprolol      0      Yes                      1 tablet,           [facility]

[facility]



     tartrate 50      3-30                               Twice a           l



     mg oral      13:53:                               day, 0           [facility]



     tablet      00                                 Refill(s)           

 

     midodrine 5            Yes                      1 tablet,           [facility]

[facility]



     mg oral      3-30                               twice a           l



     tablet      13:53:                               day, 0           [facility]



               00                                 Refill(s)           

 

     allopurinol            Yes                      1/2            [facility]

[facility]



     300 mg oral      3-30                               tablet,           l



     tablet      13:53:                               once a           [place]



               00                                 day, 0           



                                                  Refill(s)           

 

     carvedilol            Yes                      1 tablet,           [facility]

[facility]



     3.125 mg      3-30                               twice a           l



     oral tablet      13:53:                               day, 0           [facility]

[facility]



               00                                 Refill(s)           

 

     Digoxin            Yes                      1 tablet,           [facility]

[facility]



     0.125 MG      3-30                               once a           l



     Oral Tablet      13:53:                               day, 0           [facility]

[facility]



               00                                 Refill(s)           

 

     DULoxetine            Yes                      1 capsule,           [facility]

[facility]



     60 mg oral      3-30                               once a           l



     delayed      13:53:                               day, 0           [facility]



     release      00                                 Refill(s)           



     capsule                                                        

 

     apixaban 5            Yes                      1 tablet,           [facility]

[facility]



     MG Oral      3-30                               twice a           l



     Tablet      13:53:                               day, 0           [facility]



     [Eliquis]      00                                 Refill(s)           

 

     gabapentin      0      Yes                      1 capsule,           [facility]

[facility]



     300 MG Oral      3-30                               twice a           l



     Capsule      13:53:                               day, 0           [facility]



               00                                 Refill(s)           

 

     metoprolol      0      Yes                      1 tablet,           [facility]

[facility]



     tartrate 50      3-30                               Twice a           l



     mg oral      13:53:                               day, 0           [facility]



     tablet      00                                 Refill(s)           

 

     midodrine 5            Yes                      1 tablet,           [facility]

[facility]



     mg oral      3-30                               twice a           l



     tablet      13:53:                               day, 0           [facility]



               00                                 Refill(s)           

 

     allopurinol      0      Yes                      1/2            [facility]

[facility]



     300 mg oral      3-30                               tablet,           l



     tablet      13:53:                               once a           [facility]



               00                                 day, 0           



                                                  Refill(s)           

 

     carvedilol            Yes                      1 tablet,           [facility]

[facility]



     3.125 mg      3-30                               twice a           l



     oral tablet      13:53:                               day, 0           [facility]

[facility]



               00                                 Refill(s)           

 

     Digoxin            Yes                      1 tablet,           [facility]

[facility]



     0.125 MG      3-30                               once a           l



     Oral Tablet      13:53:                               day, 0           [facility]

[facility]



               00                                 Refill(s)           

 

     DULoxetine            Yes                      1 capsule,           [facility]

[facility]



     60 mg oral      3-30                               once a           l



     delayed      13:53:                               day, 0           [place]



     release      00                                 Refill(s)           



     capsule                                                        

 

     apixaban 5            Yes                      1 tablet,           [facility]

[facility]



     MG Oral      3-30                               twice a           l



     Tablet      13:53:                               day, 0           [facility]



     [Eliquis]      00                                 Refill(s)           

 

     gabapentin            Yes                      1 capsule,           M

[facility]



     300 MG Oral      3-30                               twice a           l



     Capsule      13:53:                               day, 0           [facility]



               00                                 Refill(s)           

 

     metoprolol            Yes                      1 tablet,           [facility]

[facility]



     tartrate 50      3-30                               Twice a           l



     mg oral      13:53:                               day, 0           [facility]



     tablet      00                                 Refill(s)           

 

     midodrine 5            Yes                      1 tablet,           M

[facility]



     mg oral      3-30                               twice a           l



     tablet      13:53:                               day, 0           [facility]



               00                                 Refill(s)           

 

     allopurinol            Yes                      1/2            [facility]

a



     300 mg oral      3-30                               tablet,           l



     tablet      13:53:                               once a           [facility]



               00                                 day, 0           



                                                  Refill(s)           

 

     carvedilol            Yes                      1 tablet,           [facility]

[facility]



     3.125 mg      3-30                               twice a           l



     oral tablet      13:53:                               day, 0           [facility]

[facility]



               00                                 Refill(s)           

 

     Digoxin            Yes                      1 tablet,           [facility]

[facility]



     0.125 MG      3-30                               once a           l



     Oral Tablet      13:53:                               day, 0           [facility]

[facility]



               00                                 Refill(s)           

 

     DULoxetine      0      Yes                      1 capsule,           M

[facility]



     60 mg oral      3-30                               once a           l



     delayed      13:53:                               day, 0           [place]



     release      00                                 Refill(s)           



     capsule                                                        

 

     apixaban 5            Yes                      1 tablet,           Me

[facility]



     MG Oral      3-30                               twice a           l



     Tablet      13:53:                               day, 0           [place]



     [Eliquis]      00                                 Refill(s)           

 

     gabapentin      -0      Yes                      1 capsule,           M

[facility]



     300 MG Oral      3-30                               twice a           l



     Capsule      13:53:                               day, 0           [place]



               00                                 Refill(s)           

 

     metoprolol      -0      Yes                      1 tablet,           Me

[facility]



     tartrate 50      3-30                               Twice a           l



     mg oral      13:53:                               day, 0           [facility]



     tablet      00                                 Refill(s)           

 

     midodrine 5      0      Yes                      1 tablet,           M

[facility]



     mg oral      3-30                               twice a           l



     tablet      13:53:                               day, 0           [place]



               00                                 Refill(s)           

 

     allopurinol            Yes                      1/2            [facility]

a



     300 mg oral      3-30                               tablet,           l



     tablet      13:53:                               once a           [place]



               00                                 day, 0           



                                                  Refill(s)           

 

     carvedilol      0      Yes                      1 tablet,           Me

[facility]



     3.125 mg      3-30                               twice a           l



     oral tablet      13:53:                               day, 0           [facility]

[facility]



               00                                 Refill(s)           

 

     Digoxin      0      Yes                      1 tablet,           [facility]

[facility]



     0.125 MG      3-30                               once a           l



     Oral Tablet      13:53:                               day, 0           [facility]

[facility]



               00                                 Refill(s)           

 

     DULoxetine      0      Yes                      1 capsule,           M

[facility]



     60 mg oral      3-30                               once a           l



     delayed      13:53:                               day, 0           [place]



     release      00                                 Refill(s)           



     capsule                                                        

 

     apixaban 5            Yes                      1 tablet,           [facility]

[facility]



     MG Oral      3-30                               twice a           l



     Tablet      13:53:                               day, 0           [facility]



     [Eliquis]      00                                 Refill(s)           

 

     gabapentin      -0      Yes                      1 capsule,           [facility]

[facility]



     300 MG Oral      3-30                               twice a           l



     Capsule      13:53:                               day, 0           [facility]



               00                                 Refill(s)           

 

     metoprolol      0      Yes                      1 tablet,           [facility]

[facility]



     tartrate 50      3-30                               Twice a           l



     mg oral      13:53:                               day, 0           [facility]



     tablet      00                                 Refill(s)           

 

     midodrine 5      0      Yes                      1 tablet,           M

[facility]



     mg oral      3-30                               twice a           l



     tablet      13:53:                               day, 0           [place]



               00                                 Refill(s)           

 

     allopurinol      -0      Yes                      1/2            [facility]

a



     300 mg oral      3-30                               tablet,           l



     tablet      13:53:                               once a           [facility]



               00                                 day, 0           



                                                  Refill(s)           

 

     carvedilol            Yes                      1 tablet,           [facility]

[facility]



     3.125 mg      3-30                               twice a           l



     oral tablet      13:53:                               day, 0           [facility]

[facility]



               00                                 Refill(s)           

 

     Digoxin            Yes                      1 tablet,           [facility]

[facility]



     0.125 MG      3-30                               once a           l



     Oral Tablet      13:53:                               day, 0           [facility]

[facility]



               00                                 Refill(s)           

 

     DULoxetine            Yes                      1 capsule,           M

[facility]



     60 mg oral      3-30                               once a           l



     delayed      13:53:                               day, 0           [facility]



     release      00                                 Refill(s)           



     capsule                                                        

 

     apixaban 5            Yes                      1 tablet,           [facility]

[facility]



     MG Oral      3-30                               twice a           l



     Tablet      13:53:                               day, 0           [place]



     [Eliquis]      00                                 Refill(s)           

 

     gabapentin            Yes                      1 capsule,           [facility]

[facility]



     300 MG Oral      3-30                               twice a           l



     Capsule      13:53:                               day, 0           [place]



               00                                 Refill(s)           

 

     metoprolol            Yes                      1 tablet,           [facility]

[facility]



     tartrate 50      3-30                               Twice a           l



     mg oral      13:53:                               day, 0           [place]



     tablet      00                                 Refill(s)           

 

     midodrine 5            Yes                      1 tablet,           M

[facility]



     mg oral      3-30                               twice a           l



     tablet      13:53:                               day, 0           [facility]



               00                                 Refill(s)           

 

     DULoxetine      2020      Yes                      60 mg = 1           [facility]

[facility]



     60 mg oral      1-25                               cap, PO,           l



     delayed      17:17:                               Daily, #           [facility]

n



     release      00                                 30 cap, 0           



     capsule                                         Refill(s)           

 

     Spironolact      2020      Yes                      25 mg = 1           [facility]

[facility]



     one 25 MG      1-25                               tab, PO,           l



     Oral Tablet      17:17:                               Daily, 0           [facility]

[facility]



     [Aldactone]      00                                 Refill(s)           

 

     DULoxetine      2020      Yes                      60 mg = 1           [facility]

[facility]



     60 mg oral      1-25                               cap, PO,           l



     delayed      17:17:                               Daily, #           [facility]

n



     release      00                                 30 cap, 0           



     capsule                                         Refill(s)           

 

     Spironolact      2020      Yes                      25 mg = 1           [facility]

[facility]



     one 25 MG      1-25                               tab, PO,           l



     Oral Tablet      17:17:                               Daily, 0           [facility]

[facility]



     [Aldactone]      00                                 Refill(s)           

 

     DULoxetine      2020      Yes                      60 mg = 1           [facility]

[facility]



     60 mg oral      1-25                               cap, PO,           l



     delayed      17:17:                               Daily, #           [facility]

n



     release      00                                 30 cap, 0           



     capsule                                         Refill(s)           

 

     Spironolact      2020-      Yes                      25 mg = 1           [facility]

[facility]



     one 25 MG      1-25                               tab, PO,           l



     Oral Tablet      17:17:                               Daily, 0           [facility]

[facility]



     [Aldactone]      00                                 Refill(s)           

 

     DULoxetine      -      Yes                      60 mg = 1           [facility]

[facility]



     60 mg oral      1-25                               cap, PO,           l



     delayed      17:17:                               Daily, #           [facility]

n



     release      00                                 30 cap, 0           



     capsule                                         Refill(s)           

 

     Spironolact      -      Yes                      25 mg = 1           [facility]

[facility]



     one 25 MG      1-25                               tab, PO,           l



     Oral Tablet      17:17:                               Daily, 0           [facility]

[facility]



     [Aldactone]      00                                 Refill(s)           

 

     DULoxetine      2020      Yes                      60 mg = 1           [facility]

[facility]



     60 mg oral      1-25                               cap, PO,           l



     delayed      17:17:                               Daily, #           [facility]

n



     release      00                                 30 cap, 0           



     capsule                                         Refill(s)           

 

     Spironolact      -      Yes                      25 mg = 1           [facility]

[facility]



     one 25 MG      1-25                               tab, PO,           l



     Oral Tablet      17:17:                               Daily, 0           [facility]

[facility]



     [Aldactone]      00                                 Refill(s)           

 

     DULoxetine      2020      Yes                      60 mg = 1           [facility]

[facility]



     60 mg oral      1-25                               cap, PO,           l



     delayed      17:17:                               Daily, #           [facility]

n



     release      00                                 30 cap, 0           



     capsule                                         Refill(s)           

 

     Spironolact      -      Yes                      25 mg = 1           M

[facility]



     one 25 MG      1-25                               tab, PO,           l



     Oral Tablet      17:17:                               Daily, 0           [facility]

[facility]



     [Aldactone]      00                                 Refill(s)           

 

     DULoxetine      -      Yes                      60 mg = 1           [facility]

[facility]



     60 mg oral      1-25                               cap, PO,           l



     delayed      17:17:                               Daily, #           [facility]

n



     release      00                                 30 cap, 0           



     capsule                                         Refill(s)           

 

     Spironolact      2020-      Yes                      25 mg = 1           [facility]

[facility]



     one 25 MG      1-25                               tab, PO,           l



     Oral Tablet      17:17:                               Daily, 0           [facility]

[facility]



     [Aldactone]      00                                 Refill(s)           

 

     DULoxetine      -      Yes                      60 mg = 1           [facility]

[facility]



     60 mg oral      1-25                               cap, PO,           l



     delayed      17:17:                               Daily, #           [facility]

n



     release      00                                 30 cap, 0           



     capsule                                         Refill(s)           

 

     Spironolact      -      Yes                      25 mg = 1           [facility]

[facility]



     one 25 MG      1-25                               tab, PO,           l



     Oral Tablet      17:17:                               Daily, 0           [facility]

[facility]



     [Aldactone]      00                                 Refill(s)           

 

     DULoxetine      -      Yes                      60 mg = 1           [facility]

[facility]



     60 mg oral      1-25                               cap, PO,           l



     delayed      17:17:                               Daily, #           [facility]

n



     release      00                                 30 cap, 0           



     capsule                                         Refill(s)           

 

     Spironolact      -      Yes                      25 mg = 1           [facility]

[facility]



     one 25 MG      1-25                               tab, PO,           l



     Oral Tablet      17:17:                               Daily, 0           [facility]

[facility]



     [Aldactone]      00                                 Refill(s)           

 

     DULoxetine      2020      Yes                      60 mg = 1           [facility]

[facility]



     60 mg oral      1-25                               cap, PO,           l



     delayed      17:17:                               Daily, #           [facility]

n



     release      00                                 30 cap, 0           



     capsule                                         Refill(s)           

 

     Spironolact      -      Yes                      25 mg = 1           [facility]

[facility]



     one 25 MG      1-25                               tab, PO,           l



     Oral Tablet      17:17:                               Daily, 0           [facility]

[facility]



     [Aldactone]      00                                 Refill(s)           

 

     Digoxin      -      Yes                      0.125 mg,           [facility]

[facility]



     0.125 MG      1-25                               PO, Daily,           l



     Oral Tablet      17:13:                               # 30 tab,           [facility]

[facility]



               00                                 0              



                                                  Refill(s)           

 

     Digoxin      -      Yes                      0.125 mg,           [facility]

[facility]



     0.125 MG      1-25                               PO, Daily,           l



     Oral Tablet      17:13:                               # 30 tab,           [facility]

[facility]



               00                                 0              



                                                  Refill(s)           

 

     Digoxin      -      Yes                      0.125 mg,           [facility]

[facility]



     0.125 MG      1-25                               PO, Daily,           l



     Oral Tablet      17:13:                               # 30 tab,           [facility]

[facility]



               00                                 0              



                                                  Refill(s)           

 

     Digoxin      -      Yes                      0.125 mg,           [facility]

[facility]



     0.125 MG      1-25                               PO, Daily,           l



     Oral Tablet      17:13:                               # 30 tab,           [facility]

[facility]



               00                                 0              



                                                  Refill(s)           

 

     Digoxin      2020-      Yes                      0.125 mg,           [facility]

[facility]



     0.125 MG      1-25                               PO, Daily,           l



     Oral Tablet      17:13:                               # 30 tab,           [facility]

[facility]



               00                                 0              



                                                  Refill(s)           

 

     Digoxin      -      Yes                      0.125 mg,           [facility]

[facility]



     0.125 MG      1-25                               PO, Daily,           l



     Oral Tablet      17:13:                               # 30 tab,           [facility]

[facility]



               00                                 0              



                                                  Refill(s)           

 

     Digoxin      2020-1      Yes                      0.125 mg,           [facility]

[facility]



     0.125 MG      1-25                               PO, Daily,           l



     Oral Tablet      17:13:                               # 30 tab,           [facility]

[facility]



               00                                 0              



                                                  Refill(s)           

 

     Digoxin      2020-1      Yes                      0.125 mg,           [facility]

[facility]



     0.125 MG      1-25                               PO, Daily,           l



     Oral Tablet      17:13:                               # 30 tab,           [facility]

[facility]



               00                                 0              



                                                  Refill(s)           

 

     Digoxin      2020-1      Yes                      0.125 mg,           [facility]

[facility]



     0.125 MG      1-25                               PO, Daily,           l



     Oral Tablet      17:13:                               # 30 tab,           [facility]

[facility]



               00                                 0              



                                                  Refill(s)           

 

     Digoxin      2020-1      Yes                      0.125 mg,           [facility]

[facility]



     0.125 MG      1-25                               PO, Daily,           l



     Oral Tablet      17:13:                               # 30 tab,           [facility]

[facility]



               00                                 0              



                                                  Refill(s)           

 

     Mupirocin      -      Yes                      See            [facility]



     0.02 MG/MG      0-13                               Instructio           l



     Topical      15:44:                               ns, APPLY           [facility]

[facility]



     Ointment      00                                 EXTERNALLY           



                                                  TO THE           



                                                  AFFECTED           



                                                  AREA TWICE           



                                                  DAILY, #           



                                                  66 gm, 1           



                                                  Refill(s),           



                                                  Pharmacy:           



                                                  [facility]           



                                                  #[id number],           



                                                  170.18,           



                                                  cm,            



                                                  20           



                                                  14:42:00           



                                                  CST,           



                                                  Height,           



                                                  164.318,           



                                                  kg,            



                                                  20           



                                                  14:42:00           



                                                  CST,           



                                                  Weight           

 

     Mupirocin      2020      Yes                      See            [facility]



     0.02 MG/MG      0-13                               Instructio           l



     Topical      15:44:                               ns, APPLY           [facility]

[facility]



     Ointment      00                                 EXTERNALLY           



                                                  TO THE           



                                                  AFFECTED           



                                                  AREA TWICE           



                                                  DAILY, #           



                                                  66 gm, 1           



                                                  Refill(s),           



                                                  Pharmacy:           



                                                  [facility]           



                                                  #[id number],           



                                                  170.18,           



                                                  cm,            



                                                  20           



                                                  14:42:00           



                                                  CST,           



                                                  Height,           



                                                  164.318,           



                                                  kg,            



                                                  20           



                                                  14:42:00           



                                                  CST,           



                                                  Weight           

 

     Mupirocin      2020      Yes                      See            [facility]



     0.02 MG/MG      0-13                               Instructio           l



     Topical      15:44:                               ns, APPLY           [facility]

[facility]



     Ointment      00                                 EXTERNALLY           



                                                  TO THE           



                                                  AFFECTED           



                                                  AREA TWICE           



                                                  DAILY, #           



                                                  66 gm, 1           



                                                  Refill(s),           



                                                  Pharmacy:           



                                                  [facility]           



                                                  #[id number],           



                                                  170.18,           



                                                  cm,            



                                                  20           



                                                  14:42:00           



                                                  CST,           



                                                  Height,           



                                                  164.318,           



                                                  kg,            



                                                  20           



                                                  14:42:00           



                                                  CST,           



                                                  Weight           

 

     Mupirocin      -      Yes                      See            [facility]



     0.02 MG/MG      0-13                               Instructio           l



     Topical      15:44:                               ns, APPLY           [facility]

[facility]



     Ointment      00                                 EXTERNALLY           



                                                  TO THE           



                                                  AFFECTED           



                                                  AREA TWICE           



                                                  DAILY, #           



                                                  66 gm, 1           



                                                  Refill(s),           



                                                  Pharmacy:           



                                                  [facility]           



                                                  #[id number],           



                                                  170.18,           



                                                  cm,            



                                                  20           



                                                  14:42:00           



                                                  CST,           



                                                  Height,           



                                                  164.318,           



                                                  kg,            



                                                  20           



                                                  14:42:00           



                                                  CST,           



                                                  Weight           

 

     Mupirocin      -      Yes                      See            [facility]



     0.02 MG/MG      0-13                               Instructio           l



     Topical      15:44:                               ns, APPLY           [facility]

[facility]



     Ointment      00                                 EXTERNALLY           



                                                  TO THE           



                                                  AFFECTED           



                                                  AREA TWICE           



                                                  DAILY, #           



                                                  66 gm, 1           



                                                  Refill(s),           



                                                  Pharmacy:           



                                                  [facility]           



                                                  #[id number],           



                                                  170.18,           



                                                  cm,            



                                                  20           



                                                  14:42:00           



                                                  CST,           



                                                  Height,           



                                                  164.318,           



                                                  kg,            



                                                  20           



                                                  14:42:00           



                                                  CST,           



                                                  Weight           

 

     Mupirocin      -      Yes                      See            [facility]



     0.02 MG/MG      0-13                               Instructio           l



     Topical      15:44:                               ns, APPLY           [facility]

[facility]



     Ointment      00                                 EXTERNALLY           



                                                  TO THE           



                                                  AFFECTED           



                                                  AREA TWICE           



                                                  DAILY, #           



                                                  66 gm, 1           



                                                  Refill(s),           



                                                  Pharmacy:           



                                                  [facility]           



                                                  #[id number],           



                                                  170.18,           



                                                  cm,            



                                                  20           



                                                  14:42:00           



                                                  CST,           



                                                  Height,           



                                                  164.318,           



                                                  kg,            



                                                  20           



                                                  14:42:00           



                                                  CST,           



                                                  Weight           

 

     Mupirocin      -      Yes                      See            [facility]



     0.02 MG/MG      0-13                               Instructio           l



     Topical      15:44:                               ns, APPLY           [facility]

[facility]



     Ointment      00                                 EXTERNALLY           



                                                  TO THE           



                                                  AFFECTED           



                                                  AREA TWICE           



                                                  DAILY, #           



                                                  66 gm, 1           



                                                  Refill(s),           



                                                  Pharmacy:           



                                                  [facility]           



                                                  #[id number],           



                                                  170.18,           



                                                  cm,            



                                                  20           



                                                  14:42:00           



                                                  CST,           



                                                  Height,           



                                                  164.318,           



                                                  kg,            



                                                  20           



                                                  14:42:00           



                                                  CST,           



                                                  Weight           

 

     Mupirocin      2020-      Yes                      See            [facility]



     0.02 MG/MG      0-13                               Instructio           l



     Topical      15:44:                               ns, APPLY           [facility]

[facility]



     Ointment      00                                 EXTERNALLY           



                                                  TO THE           



                                                  AFFECTED           



                                                  AREA TWICE           



                                                  DAILY, #           



                                                  66 gm, 1           



                                                  Refill(s),           



                                                  Pharmacy:           



                                                  [facility]           



                                                  [place] [facility]           



                                                  #[id number],           



                                                  170.18,           



                                                  cm,            



                                                  20           



                                                  14:42:00           



                                                  CST,           



                                                  Height,           



                                                  164.318,           



                                                  kg,            



                                                  20           



                                                  14:42:00           



                                                  CST,           



                                                  Weight           

 

     Mupirocin      2020-1      Yes                      See            [facility]



     0.02 MG/MG      0-13                               Instructio           l



     Topical      15:44:                               ns, APPLY           [facility]

[facility]



     Ointment      00                                 EXTERNALLY           



                                                  TO THE           



                                                  AFFECTED           



                                                  AREA TWICE           



                                                  DAILY, #           



                                                  66 gm, 1           



                                                  Refill(s),           



                                                  Pharmacy:           



                                                  [facility]           



                                                  [place] [facility]           



                                                  #[id number],           



                                                  170.18,           



                                                  cm,            



                                                  20           



                                                  14:42:00           



                                                  CST,           



                                                  Height,           



                                                  164.318,           



                                                  kg,            



                                                  20           



                                                  14:42:00           



                                                  CST,           



                                                  Weight           

 

     Mupirocin      2020-1      Yes                      See            [facility]



     0.02 MG/MG      0-13                               Instructio           l



     Topical      15:44:                               ns, APPLY           [facility]

[facility]



     Ointment      00                                 EXTERNALLY           



                                                  TO THE           



                                                  AFFECTED           



                                                  AREA TWICE           



                                                  DAILY, #           



                                                  66 gm, 1           



                                                  Refill(s),           



                                                  Pharmacy:           



                                                  [facility]           



                                                  [place] [facility]           



                                                  #[id number],           



                                                  170.18,           



                                                  cm,            



                                                  20           



                                                  14:42:00           



                                                  CST,           



                                                  Height,           



                                                  164.318,           



                                                  kg,            



                                                  20           



                                                  14:42:00           



                                                  CST,           



                                                  Weight           

 

     Nitrofurant      2020-0      Yes                      100 mg = 1           

[facility]



     oin 100 MG      8-09                               cap, PO,           l



     Oral      17:57:                               BID, X 10           [place]



     Capsule      00                                 day, # 20           



     [Macrobid]                                         cap, 0           



                                                  Refill(s),           



                                                  Pharmacy:           



                                                  [facility]           



                                                  [place] [facility]           



                                                  #[id number],           



                                                  170.18,           



                                                  cm,            



                                                  20           



                                                  14:42:00           



                                                  CST,           



                                                  Height,           



                                                  164.318,           



                                                  kg,            



                                                  20           



                                                  14:42:00           



                                                  CST,           



                                                  Weight           

 

     Nitrofurant      2020-0      Yes                      100 mg = 1           

[facility]



     oin 100 MG      8-09                               cap, PO,           l



     Oral      17:57:                               BID, X 10           [place]



     Capsule      00                                 day, # 20           



     [Macrobid]                                         cap, 0           



                                                  Refill(s),           



                                                  Pharmacy:           



                                                  [facility]           



                                                  [place] [facility]           



                                                  #[id number],           



                                                  170.18,           



                                                  cm,            



                                                  20           



                                                  14:42:00           



                                                  CST,           



                                                  Height,           



                                                  164.318,           



                                                  kg,            



                                                  20           



                                                  14:42:00           



                                                  CST,           



                                                  Weight           

 

     Nitrofurant      2020-0      Yes                      100 mg = 1           

[facility]



     oin 100 MG      8-09                               cap, PO,           l



     Oral      17:57:                               BID, X 10           [place]



     Capsule      00                                 day, # 20           



     [Macrobid]                                         cap, 0           



                                                  Refill(s),           



                                                  Pharmacy:           



                                                  [facility]           



                                                  #[id number],           



                                                  170.18,           



                                                  cm,            



                                                  20           



                                                  14:42:00           



                                                  CST,           



                                                  Height,           



                                                  164.318,           



                                                  kg,            



                                                  20           



                                                  14:42:00           



                                                  CST,           



                                                  Weight           

 

     Nitrofurant      2020-0      Yes                      100 mg = 1           

[facility]



     oin 100 MG      8-09                               cap, PO,           l



     Oral      17:57:                               BID, X 10           [place]



     Capsule      00                                 day, # 20           



     [Macrobid]                                         cap, 0           



                                                  Refill(s),           



                                                  Pharmacy:           



                                                  [facility]           



                                                  #[id number],           



                                                  170.18,           



                                                  cm,            



                                                  20           



                                                  14:42:00           



                                                  CST,           



                                                  Height,           



                                                  164.318,           



                                                  kg,            



                                                  20           



                                                  14:42:00           



                                                  CST,           



                                                  Weight           

 

     Nitrofurant      2020-0      Yes                      100 mg = 1           

[facility]



     oin 100 MG      8-09                               cap, PO,           l



     Oral      17:57:                               BID, X 10           [place]



     Capsule      00                                 day, # 20           



     [Macrobid]                                         cap, 0           



                                                  Refill(s),           



                                                  Pharmacy:           



                                                  [facility]           



                                                  #[id number],           



                                                  170.18,           



                                                  cm,            



                                                  20           



                                                  14:42:00           



                                                  CST,           



                                                  Height,           



                                                  164.318,           



                                                  kg,            



                                                  20           



                                                  14:42:00           



                                                  CST,           



                                                  Weight           

 

     Nitrofurant      2020-0      Yes                      100 mg = 1           

[facility]



     oin 100 MG      8-09                               cap, PO,           l



     Oral      17:57:                               BID, X 10           [place]



     Capsule      00                                 day, # 20           



     [Macrobid]                                         cap, 0           



                                                  Refill(s),           



                                                  Pharmacy:           



                                                  [facility]           



                                                  #[id number],           



                                                  170.18,           



                                                  cm,            



                                                  20           



                                                  14:42:00           



                                                  CST,           



                                                  Height,           



                                                  164.318,           



                                                  kg,            



                                                  20           



                                                  14:42:00           



                                                  CST,           



                                                  Weight           

 

     Nitrofurant      2020-0      Yes                      100 mg = 1           

[facility]



     oin 100 MG      8-09                               cap, PO,           l



     Oral      17:57:                               BID, X 10           [place]



     Capsule      00                                 day, # 20           



     [Macrobid]                                         cap, 0           



                                                  Refill(s),           



                                                  Pharmacy:           



                                                  [facility]           



                                                  #[id number],           



                                                  170.18,           



                                                  cm,            



                                                  20           



                                                  14:42:00           



                                                  CST,           



                                                  Height,           



                                                  164.318,           



                                                  kg,            



                                                  20           



                                                  14:42:00           



                                                  CST,           



                                                  Weight           

 

     Nitrofurant      2020-0      Yes                      100 mg = 1           

[facility]



     oin 100 MG      8-09                               cap, PO,           l



     Oral      17:57:                               BID, X 10           [place]



     Capsule      00                                 day, # 20           



     [Macrobid]                                         cap, 0           



                                                  Refill(s),           



                                                  Pharmacy:           



                                                  [facility]           



                                                  [place] [facility]           



                                                  #[id number],           



                                                  170.18,           



                                                  cm,            



                                                  20           



                                                  14:42:00           



                                                  CST,           



                                                  Height,           



                                                  164.318,           



                                                  kg,            



                                                  20           



                                                  14:42:00           



                                                  CST,           



                                                  Weight           

 

     Nitrofurant      2020-0      Yes                      100 mg = 1           

[facility]



     oin 100 MG      8-09                               cap, PO,           l



     Oral      17:57:                               BID, X 10           [place]



     Capsule      00                                 day, # 20           



     [Macrobid]                                         cap, 0           



                                                  Refill(s),           



                                                  Pharmacy:           



                                                  [facility]           



                                                  #[id number],           



                                                  170.18,           



                                                  cm,            



                                                  20           



                                                  14:42:00           



                                                  CST,           



                                                  Height,           



                                                  164.318,           



                                                  kg,            



                                                  20           



                                                  14:42:00           



                                                  CST,           



                                                  Weight           

 

     Nitrofurant      2020-0      Yes                      100 mg = 1           

[facility]



     oin 100 MG      8-09                               cap, PO,           l



     Oral      17:57:                               BID, X 10           [place]



     Capsule      00                                 day, # 20           



     [Macrobid]                                         cap, 0           



                                                  Refill(s),           



                                                  Pharmacy:           



                                                  [facility]           



                                                  #[id number],           



                                                  170.18,           



                                                  cm,            



                                                  20           



                                                  14:42:00           



                                                  CST,           



                                                  Height,           



                                                  164.318,           



                                                  kg,            



                                                  20           



                                                  14:42:00           



                                                  CST,           



                                                  Weight           

 

     lisinopril      2020-0      Yes                      2.5 mg = 1           [facility]

[facility]



     2.5 mg oral      6-04                               tab, PO,           l



     tablet      23:26:                               Daily, #           [place]



               00                                 30 tab, 2           



                                                  Refill(s),           



                                                  called to           



                                                  pharmacy           

 

     lisinopril      2020-0      Yes                      2.5 mg = 1           [facility]

[facility]



     2.5 mg oral      6-04                               tab, PO,           l



     tablet      23:26:                               Daily, #           [place]



               00                                 30 tab, 2           



                                                  Refill(s),           



                                                  called to           



                                                  pharmacy           

 

     lisinopril      2020-0      Yes                      2.5 mg = 1           [facility]

[facility]



     2.5 mg oral      6-04                               tab, PO,           l



     tablet      23:26:                               Daily, #           [place]



               00                                 30 tab, 2           



                                                  Refill(s),           



                                                  called to           



                                                  pharmacy           

 

     lisinopril      2020-0      Yes                      2.5 mg = 1           [facility]

[facility]



     2.5 mg oral      6-04                               tab, PO,           l



     tablet      23:26:                               Daily, #           [place]



               00                                 30 tab, 2           



                                                  Refill(s),           



                                                  called to           



                                                  pharmacy           

 

     lisinopril      2020-0      Yes                      2.5 mg = 1           [facility]

[facility]



     2.5 mg oral      6-04                               tab, PO,           l



     tablet      23:26:                               Daily, #           [place]



               00                                 30 tab, 2           



                                                  Refill(s),           



                                                  called to           



                                                  pharmacy           

 

     lisinopril      2020-0      Yes                      2.5 mg = 1           [facility]

[facility]



     2.5 mg oral      6-04                               tab, PO,           l



     tablet      23:26:                               Daily, #           [place]



               00                                 30 tab, 2           



                                                  Refill(s),           



                                                  called to           



                                                  pharmacy           

 

     lisinopril      2020-0      Yes                      2.5 mg = 1           [facility]

[facility]



     2.5 mg oral      6-04                               tab, PO,           l



     tablet      23:26:                               Daily, #           [place]



               00                                 30 tab, 2           



                                                  Refill(s),           



                                                  called to           



                                                  pharmacy           

 

     lisinopril      2020-0      Yes                      2.5 mg = 1           [facility]

[facility]



     2.5 mg oral      6-04                               tab, PO,           l



     tablet      23:26:                               Daily, #           [place]



               00                                 30 tab, 2           



                                                  Refill(s),           



                                                  called to           



                                                  pharmacy           

 

     lisinopril      2020-0      Yes                      2.5 mg = 1           [facility]

[facility]



     2.5 mg oral      6-04                               tab, PO,           l



     tablet      23:26:                               Daily, #           [place]



               00                                 30 tab, 2           



                                                  Refill(s),           



                                                  called to           



                                                  pharmacy           

 

     lisinopril      2020-0      Yes                      2.5 mg = 1           [facility]

[facility]



     2.5 mg oral      6-04                               tab, PO,           l



     tablet      23:26:                               Daily, #           [place]



               00                                 30 tab, 2           



                                                  Refill(s),           



                                                  called to           



                                                  pharmacy           

 

     calcitriol      2020-0      Yes                      = 1 cap,           [facility]

[facility]



     0.25 mcg      5-20                               PO, Daily,           l



     oral      12:18:                               # 90 cap,           [place]



     capsule      00                                 1              



                                                  Refill(s),           



                                                  Pharmacy:           



                                                  [facility]           



                                                  [place] [facility]           



                                                  #[id number]           

 

     calcitriol      2020-0      Yes                      = 1 cap,           [facility]

[facility]



     0.25 mcg      5-20                               PO, Daily,           l



     oral      12:18:                               # 90 cap,           [place]



     capsule      00                                 1              



                                                  Refill(s),           



                                                  Pharmacy:           



                                                  [facility]           



                                                  #[id number]           

 

     calcitriol      2020-0      Yes                      = 1 cap,           [facility]

[facility]



     0.25 mcg      5-20                               PO, Daily,           l



     oral      12:18:                               # 90 cap,           [place]



     capsule      00                                 1              



                                                  Refill(s),           



                                                  Pharmacy:           



                                                  [facility]           



                                                  [place] [facility]           



                                                  #[id number]           

 

     calcitriol      2020-0      Yes                      = 1 cap,           [facility]

[facility]



     0.25 mcg      5-20                               PO, Daily,           l



     oral      12:18:                               # 90 cap,           [place]



     capsule      00                                 1              



                                                  Refill(s),           



                                                  Pharmacy:           



                                                  [facility]           



                                                  #[id number]           

 

     calcitriol      2020-0      Yes                      = 1 cap,           [facility]

[facility]



     0.25 mcg      5-20                               PO, Daily,           l



     oral      12:18:                               # 90 cap,           [place]



     capsule      00                                 1              



                                                  Refill(s),           



                                                  Pharmacy:           



                                                  [facility]           



                                                  #[id number]           

 

     calcitriol      2020-0      Yes                      = 1 cap,           [facility]

[facility]



     0.25 mcg      5-20                               PO, Daily,           l



     oral      12:18:                               # 90 cap,           [place]



     capsule      00                                 1              



                                                  Refill(s),           



                                                  Pharmacy:           



                                                  [facility]           



                                                  #[id number]           

 

     calcitriol      2020-0      Yes                      = 1 cap,           [facility]

[facility]



     0.25 mcg      5-20                               PO, Daily,           l



     oral      12:18:                               # 90 cap,           [place]



     capsule      00                                 1              



                                                  Refill(s),           



                                                  Pharmacy:           



                                                  [facility]           



                                                  #[id number]           

 

     calcitriol      2020-0      Yes                      = 1 cap,           [facility]

[facility]



     0.25 mcg      5-20                               PO, Daily,           l



     oral      12:18:                               # 90 cap,           [facility]



     capsule      00                                 1              



                                                  Refill(s),           



                                                  Pharmacy:           



                                                  [facility]           



                                                  #[id number]           

 

     calcitriol      2020-0      Yes                      = 1 cap,           [facility]

[facility]



     0.25 mcg      5-20                               PO, Daily,           l



     oral      12:18:                               # 90 cap,           [place]



     capsule      00                                 1              



                                                  Refill(s),           



                                                  Pharmacy:           



                                                  [facility]           



                                                  #[id number]           

 

     calcitriol      2020-0      Yes                      = 1 cap,           [facility]

[facility]



     0.25 mcg      5-20                               PO, Daily,           l



     oral      12:18:                               # 90 cap,           [place]



     capsule      00                                 1              



                                                  Refill(s),           



                                                  Pharmacy:           



                                                  [facility]           



                                                  #[id number]           

 

     calcitriol      2020-0      Yes                      = 1 cap,           [facility]

[facility]



     0.25 mcg      4-16                               PO, Daily,           l



     oral      16:37:                               # 90           [place]



     capsule      47                                 unknown           



                                                  unit,           



                                                  Pharmacy:           



                                                  [facility]           



                                                  #[id number]           

 

     calcitriol      2020-0      Yes                      = 1 cap,           [facility]

[facility]



     0.25 mcg      4-16                               PO, Daily,           l



     oral      16:37:                               # 90           [place]



     capsule      47                                 unknown           



                                                  unit,           



                                                  Pharmacy:           



                                                  [facility]           



                                                  [facility]           



                                                  #[id number]           

 

     calcitriol      2020-0      Yes                      = 1 cap,           [facility]

[facility]



     0.25 mcg      4-16                               PO, Daily,           l



     oral      16:37:                               # 90           [place]



     capsule      47                                 unknown           



                                                  unit,           



                                                  Pharmacy:           



                                                  [facility]           



                                                  [facility]           



                                                  #[id number]           

 

     calcitriol      2020-0      Yes                      = 1 cap,           [facility]

[facility]



     0.25 mcg      4-16                               PO, Daily,           l



     oral      16:37:                               # 90           [place]



     capsule      47                                 unknown           



                                                  unit,           



                                                  Pharmacy:           



                                                  [facility]           



                                                  [facility]           



                                                  #[id number]           

 

     calcitriol      2020-0      Yes                      = 1 cap,           [facility]

[facility]



     0.25 mcg      4-16                               PO, Daily,           l



     oral      16:37:                               # 90           [place]



     capsule      47                                 unknown           



                                                  unit,           



                                                  Pharmacy:           



                                                  [facility]           



                                                  [facility]           



                                                  #[id number]           

 

     calcitriol      2020-0      Yes                      = 1 cap,           [facility]

[facility]



     0.25 mcg      4-16                               PO, Daily,           l



     oral      16:37:                               # 90           [facility]



     capsule      47                                 unknown           



                                                  unit,           



                                                  Pharmacy:           



                                                  [facility]           



                                                  [facility]           



                                                  #[id number]           

 

     calcitriol      2020-0      Yes                      = 1 cap,           [facility]

[facility]



     0.25 mcg      4-16                               PO, Daily,           l



     oral      16:37:                               # 90           [place]



     capsule      47                                 unknown           



                                                  unit,           



                                                  Pharmacy:           



                                                  [facility]           



                                                  [facility]           



                                                  #[id number]           

 

     calcitriol      2020-0      Yes                      = 1 cap,           [facility]

[facility]



     0.25 mcg      4-16                               PO, Daily,           l



     oral      16:37:                               # 90           [place]



     capsule      47                                 unknown           



                                                  unit,           



                                                  Pharmacy:           



                                                  [facility]           



                                                  [facility] [facility]           



                                                  #[id number]           

 

     calcitriol      2020-0      Yes                      = 1 cap,           [facility]

[facility]



     0.25 mcg      4-16                               PO, Daily,           l



     oral      16:37:                               # 90           [place]



     capsule      47                                 unknown           



                                                  unit,           



                                                  Pharmacy:           



                                                  [facility]           



                                                  [facility]           



                                                  #[id number]           

 

     calcitriol      2020-0      Yes                      = 1 cap,           [facility]

[facility]



     0.25 mcg      4-16                               PO, Daily,           l



     oral      16:37:                               # 90           [place]



     capsule      47                                 unknown           



                                                  unit,           



                                                  Pharmacy:           



                                                  [facility]           



                                                  [facility]           



                                                  #[id number]           

 

     Furosemide      2020-0      Yes                      = 1 tab,           [facility]

[facility]



     40 MG Oral      4-13                               PO, Every           l



     Tablet      20:06:                               Other Day,           [place]

[facility]



               19                                 # 45 tab,           



                                                  Pharmacy:           



                                                  [facility]           



                                                  [facility]           



                                                  #[id number]           

 

     Furosemide      2020-0      Yes                      = 1 tab,           [facility]

[facility]



     40 MG Oral      4-13                               PO, Every           l



     Tablet      20:06:                               Other Day,           [facility]



               19                                 # 45 tab,           



                                                  Pharmacy:           



                                                  [facility]           



                                                  #[id number]           

 

     Furosemide      2020-0      Yes                      = 1 tab,           [facility]

[facility]



     40 MG Oral      4-13                               PO, Every           l



     Tablet      20:06:                               Other Day,           [facility]



               19                                 # 45 tab,           



                                                  Pharmacy:           



                                                  [facility]           



                                                  #[id number]           

 

     Furosemide      2020-0      Yes                      = 1 tab,           [facility]

[facility]



     40 MG Oral      4-13                               PO, Every           l



     Tablet      20:06:                               Other Day,           [facility]



               19                                 # 45 tab,           



                                                  Pharmacy:           



                                                  [facility]           



                                                  #[id number]           

 

     Furosemide      2020-0      Yes                      = 1 tab,           [facility]

[facility]



     40 MG Oral      4-13                               PO, Every           l



     Tablet      20:06:                               Other Day,           [facility]



               19                                 # 45 tab,           



                                                  Pharmacy:           



                                                  [facility]           



                                                  #[id number]           

 

     Furosemide      2020-0      Yes                      = 1 tab,           [facility]

[facility]



     40 MG Oral      4-13                               PO, Every           l



     Tablet      20:06:                               Other Day,           [facility]



               19                                 # 45 tab,           



                                                  Pharmacy:           



                                                  [facility]           



                                                  [facility]           



                                                  #[id number]           

 

     Furosemide      2020-0      Yes                      = 1 tab,           [facility]

[facility]



     40 MG Oral      4-13                               PO, Every           l



     Tablet      20:06:                               Other Day,           [facility]



               19                                 # 45 tab,           



                                                  Pharmacy:           



                                                  [facility]           



                                                  [facility]           



                                                  #[id number]           

 

     Furosemide      2020-0      Yes                      = 1 tab,           [facility]

[facility]



     40 MG Oral      4-13                               PO, Every           l



     Tablet      20:06:                               Other Day,           [facility]



               19                                 # 45 tab,           



                                                  Pharmacy:           



                                                  [facility]           



                                                  [facility]           



                                                  #[id number]           

 

     Furosemide      2020-0      Yes                      = 1 tab,           [facility]

[facility]



     40 MG Oral      4-13                               PO, Every           l



     Tablet      20:06:                               Other Day,           [facility]

nn



               19                                 # 45 tab,           



                                                  Pharmacy:           



                                                  [facility]           



                                                  #[id number]           

 

     Furosemide      2020-0      Yes                      = 1 tab,           [facility]

[facility]



     40 MG Oral      4-13                               PO, Every           l



     Tablet      20:06:                               Other Day,           [facility]

[facility]



               19                                 # 45 tab,           



                                                  Pharmacy:           



                                                  [facility]           



                                                  #[id number]           

 

     prednisolon      2020-0      Yes                      1 drop in           [facility]

[facility]



     e acetate      4-10                               each eye,           l



     10 MG/ML      20:53:                               once           [place]



     Ophthalmic      00                                 daily, 0           



     Suspension                                         Refill(s)           

 

     bromfenac      2020-0      Yes                      1 drop in           [facility]

[facility]



     0.7 MG/ML      4-10                               right eye,           l



     Ophthalmic      20:53:                               once           [place]



     Solution      00                                 daily, 0           



     [Prolensa]                                         Refill(s)           

 

     prednisolon      2020-0      Yes                      1 drop in           [facility]

[facility]



     e acetate      4-10                               each eye,           l



     10 MG/ML      20:53:                               once           [place]



     Ophthalmic      00                                 daily, 0           



     Suspension                                         Refill(s)           

 

     bromfenac      2020-0      Yes                      1 drop in           [facility]

[facility]



     0.7 MG/ML      4-10                               right eye,           l



     Ophthalmic      20:53:                               once           [place]



     Solution      00                                 daily, 0           



     [Prolensa]                                         Refill(s)           

 

     prednisolon      2020-0      Yes                      1 drop in           [facility]

[facility]



     e acetate      4-10                               each eye,           l



     10 MG/ML      20:53:                               once           [place]



     Ophthalmic      00                                 daily, 0           



     Suspension                                         Refill(s)           

 

     bromfenac      2020-0      Yes                      1 drop in           [facility]

[facility]



     0.7 MG/ML      4-10                               right eye,           l



     Ophthalmic      20:53:                               once           [place]



     Solution      00                                 daily, 0           



     [Prolensa]                                         Refill(s)           

 

     prednisolon      2020-0      Yes                      1 drop in           [facility]

[facility]



     e acetate      4-10                               each eye,           l



     10 MG/ML      20:53:                               once           [place]



     Ophthalmic      00                                 daily, 0           



     Suspension                                         Refill(s)           

 

     bromfenac      2020-0      Yes                      1 drop in           [facility]

[facility]



     0.7 MG/ML      4-10                               right eye,           l



     Ophthalmic      20:53:                               once           [place]



     Solution      00                                 daily, 0           



     [Prolensa]                                         Refill(s)           

 

     prednisolon      2020-0      Yes                      1 drop in           [facility]

[facility]



     e acetate      4-10                               each eye,           l



     10 MG/ML      20:53:                               once           [place]



     Ophthalmic      00                                 daily, 0           



     Suspension                                         Refill(s)           

 

     bromfenac      2020-0      Yes                      1 drop in           [facility]

[facility]



     0.7 MG/ML      4-10                               right eye,           l



     Ophthalmic      20:53:                               once           [place]



     Solution      00                                 daily, 0           



     [Prolensa]                                         Refill(s)           

 

     prednisolon      2020-0      Yes                      1 drop in           [facility]

[facility]



     e acetate      4-10                               each eye,           l



     10 MG/ML      20:53:                               once           [place]



     Ophthalmic      00                                 daily, 0           



     Suspension                                         Refill(s)           

 

     bromfenac      2020-0      Yes                      1 drop in           [facility]

[facility]



     0.7 MG/ML      4-10                               right eye,           l



     Ophthalmic      20:53:                               once           [place]



     Solution      00                                 daily, 0           



     [Prolensa]                                         Refill(s)           

 

     prednisolon      2020-0      Yes                      1 drop in           [facility]

[facility]



     e acetate      4-10                               each eye,           l



     10 MG/ML      20:53:                               once           [place]



     Ophthalmic      00                                 daily, 0           



     Suspension                                         Refill(s)           

 

     bromfenac      2020-0      Yes                      1 drop in           [facility]

[facility]



     0.7 MG/ML      4-10                               right eye,           l



     Ophthalmic      20:53:                               once           [place]



     Solution      00                                 daily, 0           



     [Prolensa]                                         Refill(s)           

 

     prednisolon      2020-0      Yes                      1 drop in           [facility]

[facility]



     e acetate      4-10                               each eye,           l



     10 MG/ML      20:53:                               once           [place]



     Ophthalmic      00                                 daily, 0           



     Suspension                                         Refill(s)           

 

     bromfenac      2020-0      Yes                      1 drop in           [facility]

[facility]



     0.7 MG/ML      4-10                               right eye,           l



     Ophthalmic      20:53:                               once           [place]



     Solution      00                                 daily, 0           



     [Prolensa]                                         Refill(s)           

 

     prednisolon      2020-0      Yes                      1 drop in           [facility]

[facility]



     e acetate      4-10                               each eye,           l



     10 MG/ML      20:53:                               once           [place]



     Ophthalmic      00                                 daily, 0           



     Suspension                                         Refill(s)           

 

     bromfenac      2020-0      Yes                      1 drop in           [facility]

[facility]



     0.7 MG/ML      4-10                               right eye,           l



     Ophthalmic      20:53:                               once           [place]



     Solution      00                                 daily, 0           



     [Prolensa]                                         Refill(s)           

 

     prednisolon      2020-0      Yes                      1 drop in           [facility]

[facility]



     e acetate      4-10                               each eye,           l



     10 MG/ML      20:53:                               once           [place]



     Ophthalmic      00                                 daily, 0           



     Suspension                                         Refill(s)           

 

     bromfenac      2020-0      Yes                      1 drop in           [facility]

[facility]



     0.7 MG/ML      4-10                               right eye,           l



     Ophthalmic      20:53:                               once           [place]



     Solution      00                                 daily, 0           



     [Prolensa]                                         Refill(s)           

 

     Furosemide      2020-0      Yes                      = 1 tab,           [facility]

[facility]



     40 MG Oral      1-23                               PO, Every           l



     Tablet      15:13:                               Other Day,           [facility]



               34                                 # 45 tab,           



                                                  Pharmacy:           



                                                  [facility]           



                                                  #[id number]           

 

     Furosemide      2020-0      Yes                      = 1 tab,           [facility]

[facility]



     40 MG Oral      1-23                               PO, Every           l



     Tablet      15:13:                               Other Day,           [facility]



               34                                 # 45 tab,           



                                                  Pharmacy:           



                                                  [facility]           



                                                  #[id number]           

 

     Furosemide      2020-0      Yes                      = 1 tab,           [facility]

[facility]



     40 MG Oral      1-23                               PO, Every           l



     Tablet      15:13:                               Other Day,           [facility]



               34                                 # 45 tab,           



                                                  Pharmacy:           



                                                  [facility]           



                                                  #[id number]           

 

     Furosemide      2020-0      Yes                      = 1 tab,           [facility]

[facility]



     40 MG Oral      1-23                               PO, Every           l



     Tablet      15:13:                               Other Day,           [facility]



               34                                 # 45 tab,           



                                                  Pharmacy:           



                                                  [facility]           



                                                  #[id number]           

 

     Furosemide      2020-0      Yes                      = 1 tab,           [facility]

[facility]



     40 MG Oral      1-23                               PO, Every           l



     Tablet      15:13:                               Other Day,           [facility]



               34                                 # 45 tab,           



                                                  Pharmacy:           



                                                  [facility]           



                                                  #[id number]           

 

     Furosemide      2020-0      Yes                      = 1 tab,           [facility]

[facility]



     40 MG Oral      1-23                               PO, Every           l



     Tablet      15:13:                               Other Day,           [facility]



               34                                 # 45 tab,           



                                                  Pharmacy:           



                                                  [facility]           



                                                  #[id number]           

 

     Furosemide      2020-0      Yes                      = 1 tab,           [facility]

[facility]



     40 MG Oral      1-23                               PO, Every           l



     Tablet      15:13:                               Other Day,           [facility]



               34                                 # 45 tab,           



                                                  Pharmacy:           



                                                  [facility]           



                                                  #[id number]           

 

     Furosemide      2020-0      Yes                      = 1 tab,           [facility]

[facility]



     40 MG Oral      1-23                               PO, Every           l



     Tablet      15:13:                               Other Day,           [facility]



               34                                 # 45 tab,           



                                                  Pharmacy:           



                                                  [facility]           



                                                  #[id number]           

 

     Furosemide      2020-0      Yes                      = 1 tab,           [facility]

[facility]



     40 MG Oral      1-23                               PO, Every           l



     Tablet      15:13:                               Other Day,           [facility]



               34                                 # 45 tab,           



                                                  Pharmacy:           



                                                  [facility]           



                                                  #[id number]           

 

     Furosemide      2020-0      Yes                      = 1 tab,           [facility]

[facility]



     40 MG Oral      1-23                               PO, Every           l



     Tablet      15:13:                               Other Day,           [facility]



               34                                 # 45 tab,           



                                                  Pharmacy:           



                                                  [facility]           



                                                  #[id number]           

 

     Mupirocin      2019      Yes                      See            [facility]



     0.02 MG/MG      2-27                               Instructio           l



     Topical      19:11:                               ns, # 66           [facility]

n



     Ointment      15                                 gm, APPLY           



                                                  EXTERNALLY           



                                                  TO THE           



                                                  AFFECTED           



                                                  AREA TWICE           



                                                  DAILY,           



                                                  Pharmacy:           



                                                  [facility]           



                                                  #[id number]           

 

     Mupirocin      2019      Yes                      See            [facility]



     0.02 MG/MG      2-27                               Instructio           l



     Topical      19:11:                               ns, # 66           [facility]

n



     Ointment      15                                 gm, APPLY           



                                                  EXTERNALLY           



                                                  TO THE           



                                                  AFFECTED           



                                                  AREA TWICE           



                                                  DAILY,           



                                                  Pharmacy:           



                                                  [facility]           



                                                  #[id number]           

 

     Mupirocin      2019      Yes                      See            [facility]



     0.02 MG/MG      2-27                               Instructio           l



     Topical      19:11:                               ns, # 66           [facility]

n



     Ointment      15                                 gm, APPLY           



                                                  EXTERNALLY           



                                                  TO THE           



                                                  AFFECTED           



                                                  AREA TWICE           



                                                  DAILY,           



                                                  Pharmacy:           



                                                  [facility]           



                                                  [facility]           



                                                  #[id number]           

 

     Mupirocin      2019      Yes                      See            [facility]



     0.02 MG/MG      2-27                               Instructio           l



     Topical      19:11:                               ns, # 66           Donny

n



     Ointment      15                                 gm, APPLY           



                                                  EXTERNALLY           



                                                  TO THE           



                                                  AFFECTED           



                                                  AREA TWICE           



                                                  DAILY,           



                                                  Pharmacy:           



                                                  [facility]           



                                                  [facility]           



                                                  #[id number]           

 

     Mupirocin      2019      Yes                      See            [facility]



     0.02 MG/MG      2-27                               Instructio           l



     Topical      19:11:                               ns, # 66           Donny

n



     Ointment      15                                 gm, APPLY           



                                                  EXTERNALLY           



                                                  TO THE           



                                                  AFFECTED           



                                                  AREA TWICE           



                                                  DAILY,           



                                                  Pharmacy:           



                                                  [facility]           



                                                  [facility] [address]      2019      Yes                      See            [facility]



     0.02 MG/MG      2-27                               Instructio           l



     Topical      19:11:                               ns, # 66           Donny

n



     Ointment      15                                 gm, APPLY           



                                                  EXTERNALLY           



                                                  TO THE           



                                                  AFFECTED           



                                                  AREA TWICE           



                                                  DAILY,           



                                                  Pharmacy:           



                                                  [facility]           



                                                  [facility]           



                                                  #[id number]           

 

     Mupirocin      2019      Yes                      See            [facility]



     0.02 MG/MG      2-27                               Instructio           l



     Topical      19:11:                               ns, # 66           Donny

n



     Ointment      15                                 gm, APPLY           



                                                  EXTERNALLY           



                                                  TO THE           



                                                  AFFECTED           



                                                  AREA TWICE           



                                                  DAILY,           



                                                  Pharmacy:           



                                                  [facility]           



                                                  [facility]           



                                                  #[id number]           

 

     Mupirocin      2019      Yes                      See            [facility]



     0.02 MG/MG      2-27                               Instructio           l



     Topical      19:11:                               ns, # 66           Donny

n



     Ointment      15                                 gm, APPLY           



                                                  EXTERNALLY           



                                                  TO THE           



                                                  AFFECTED           



                                                  AREA TWICE           



                                                  DAILY,           



                                                  Pharmacy:           



                                                  [facility]           



                                                  [facility]           



                                                  #[id number]           

 

     Mupirocin      2019      Yes                      See            [facility]



     0.02 MG/MG      2-27                               Instructio           l



     Topical      19:11:                               ns, # 66           Donny

n



     Ointment      15                                 gm, APPLY           



                                                  EXTERNALLY           



                                                  TO THE           



                                                  AFFECTED           



                                                  AREA TWICE           



                                                  DAILY,           



                                                  Pharmacy:           



                                                  [facility]           



                                                  [facility]           



                                                  #[id number]           

 

     Mupirocin      2019      Yes                      See            [facility]



     0.02 MG/MG      2-27                               Instructio           l



     Topical      19:11:                               ns, # 66           Donny

n



     Ointment      15                                 gm, APPLY           



                                                  EXTERNALLY           



                                                  TO THE           



                                                  AFFECTED           



                                                  AREA TWICE           



                                                  DAILY,           



                                                  Pharmacy:           



                                                  [facility]           



                                                  [facility]           



                                                  #[facility]           

 

     Nitrofurant      2019      Yes                      100 mg = 1           

[facility]



     oin 100 MG      2-13                               cap, PO,           l



     Oral      01:44:                               BID, X 5           [place]



     Capsule      00                                 day, # 10           



     [Macrodanti                                         cap, 0           



     n]                                           Refill(s),           



                                                  Pharmacy:           



                                                  [facility]           



                                                  [facility]           



                                                  #[id number]           

 

     Nitrofurant      2019      Yes                      100 mg = 1           

[facility]



     oin 100 MG      2-13                               cap, PO,           l



     Oral      01:44:                               BID, X 5           [place]



     Capsule      00                                 day, # 10           



     [Macrodanti                                         cap, 0           



     n]                                           Refill(s),           



                                                  Pharmacy:           



                                                  [facility]           



                                                  [facility]           



                                                  #[id number]           

 

     Nitrofurant      2019      Yes                      100 mg = 1           

[facility]



     oin 100 MG      2-13                               cap, PO,           l



     Oral      01:44:                               BID, X 5           [place]



     Capsule      00                                 day, # 10           



     [Macrodanti                                         cap, 0           



     n]                                           Refill(s),           



                                                  Pharmacy:           



                                                  [facility]           



                                                  [facility]           



                                                  #[id number]           

 

     Nitrofurant      2019      Yes                      100 mg = 1           

[facility]



     oin 100 MG      2-13                               cap, PO,           l



     Oral      01:44:                               BID, X 5           [place]



     Capsule      00                                 day, # 10           



     [Macrodanti                                         cap, 0           



     n]                                           Refill(s),           



                                                  Pharmacy:           



                                                  [facility]           



                                                  [facility]           



                                                  #[id number]           

 

     Nitrofurant      2019      Yes                      100 mg = 1           

[facility]



     oin 100 MG      2-13                               cap, PO,           l



     Oral      01:44:                               BID, X 5           [place]



     Capsule      00                                 day, # 10           



     [Macrodanti                                         cap, 0           



     n]                                           Refill(s),           



                                                  Pharmacy:           



                                                  [facility]           



                                                  [facility]           



                                                  #[id number]           

 

     Nitrofurant      2019      Yes                      100 mg = 1           

[facility]



     oin 100 MG      2-13                               cap, PO,           l



     Oral      01:44:                               BID, X 5           [place]



     Capsule      00                                 day, # 10           



     [Macrodanti                                         cap, 0           



     n]                                           Refill(s),           



                                                  Pharmacy:           



                                                  [facility]           



                                                  [facility]           



                                                  #[id number]           

 

     Nitrofurant      2019      Yes                      100 mg = 1           

[facility]



     oin 100 MG      2-13                               cap, PO,           l



     Oral      01:44:                               BID, X 5           [place]



     Capsule      00                                 day, # 10           



     [Macrodanti                                         cap, 0           



     n]                                           Refill(s),           



                                                  Pharmacy:           



                                                  [facility]           



                                                  [facility]           



                                                  #[id number]           

 

     Nitrofurant      2019      Yes                      100 mg = 1           

[facility]



     oin 100 MG      2-13                               cap, PO,           l



     Oral      01:44:                               BID, X 5           [place]



     Capsule      00                                 day, # 10           



     [Macrodanti                                         cap, 0           



     n]                                           Refill(s),           



                                                  Pharmacy:           



                                                  [facility]           



                                                  [facility]           



                                                  #[id number]           

 

     Nitrofurant      2019      Yes                      100 mg = 1           

[facility]



     oin 100 MG      2-13                               cap, PO,           l



     Oral      01:44:                               BID, X 5           [place]



     Capsule                                       day, # 10           



     [Macrodanti                                         cap, 0           



     n]                                           Refill(s),           



                                                  Pharmacy:           



                                                  [facility]           



                                                  [facility]           



                                                  #[id number]           

 

     Nitrofurant      2019      Yes                      100 mg = 1           

[facility]



     oin 100 MG      2-13                               cap, PO,           l



     Oral      01:44:                               BID, X 5           [place]



     Capsule                                       day, # 10           



     [Macrodanti                                         cap, 0           



     n]                                           Refill(s),           



                                                  Pharmacy:           



                                                  [facility]           



                                                  [facility]           



                                                  #[id number]           

 

     apixaban 2019      Yes                      5 mg, PO,           Me

[facility]



     MG Oral      0-25                               Q12H, 0           l



     Tablet      15:07:                               Refill(s)           Donny

n



     [Eliquis]      00                                                

 

     metoprolol      2019      Yes                      50 mg = 1           Me

[facility]



     tartrate 50      0-25                               tab, PO,           l



     mg oral      15:07:                               BID, # 180           [facility]

adryl



     tablet      00                                 tab, 0           



                                                  Refill(s)           

 

     Diltiazem      2019      Yes                      180 mg = 1           Me

[facility]



     Hydrochlori      0-25                               cap, PO,           l



     de       15:07:                               Daily, #           [facility]

daryl



     mg/24 hours      00                                 30 cap, 0           



     oral                                         Refill(s)           



     capsule,                                                        



     extended                                                        



     release                                                        

 

     tramadol      2019      Yes                      150 mg = 1           Mem

[facility]



     150 mg/24      0-25                               cap, PO,           l



     hours oral      15:07:                               Daily, 0           Her

hernandes



     capsule,      00                                 Refill(s)           



     extended                                                        



     release                                                        

 

     Acetaminoph      2019      Yes                      1 tab, PO,           

[facility]



     en 325 MG /      0-25                               Q6H, 0           l



     Hydrocodone      15:07:                               Refill(s)           H

ermann



     Bitartrate      00                                                



     5 MG Oral                                                        



     Tablet                                                        



     [Norco                                                        



     5/325]                                                        

 

     apixaban 5      2019      Yes                      5 mg, PO,           Me

moria



     MG Oral      0-25                               Q12H, 0           l



     Tablet      15:07:                               Refill(s)           Donny

n



     [Eliquis]      00                                                

 

     metoprolol      2019      Yes                      50 mg = 1           Me

moria



     tartrate 50      0-25                               tab, PO,           l



     mg oral      15:07:                               BID, # 180           [facility]

daryl



     tablet      00                                 tab, 0           



                                                  Refill(s)           

 

     Diltiazem      2019      Yes                      180 mg = 1           Me

moria



     Hydrochlori      0-25                               cap, PO,           l



     de       15:07:                               Daily, #           [facility]

daryl



     mg/24 hours      00                                 30 cap, 0           



     oral                                         Refill(s)           



     capsule,                                                        



     extended                                                        



     release                                                        

 

     tramadol      2019      Yes                      150 mg = 1           Mem

[facility]



     150 mg/24      0-25                               cap, PO,           l



     hours oral      15:07:                               Daily, 0           Her

hernandes



     capsule,      00                                 Refill(s)           



     extended                                                        



     release                                                        

 

     Acetaminoph      2019      Yes                      1 tab, PO,           

[facility]



     en 325 MG /      0-25                               Q6H, 0           l



     Hydrocodone      15:07:                               Refill(s)           H

ermann



     Bitartrate      00                                                



     5 MG Oral                                                        



     Tablet                                                        



     [Norco                                                        



     5/325]                                                        

 

     apixaban 5      2019      Yes                      5 mg, PO,           Me

moria



     MG Oral      0-25                               Q12H, 0           l



     Tablet      15:07:                               Refill(s)           Donny

n



     [Eliquis]      00                                                

 

     metoprolol      2019      Yes                      50 mg = 1           Me

moria



     tartrate 50      0-25                               tab, PO,           l



     mg oral      15:07:                               BID, # 180           [facility]

daryl



     tablet      00                                 tab, 0           



                                                  Refill(s)           

 

     Diltiazem      2019      Yes                      180 mg = 1           Me

[facility]ia



     Hydrochlori      0-25                               cap, PO,           l



     de       15:07:                               Daily, #           [facility]

daryl



     mg/24 hours      00                                 30 cap, 0           



     oral                                         Refill(s)           



     capsule,                                                        



     extended                                                        



     release                                                        

 

     tramadol      2019      Yes                      150 mg = 1           Mem

oria



     150 mg/24      0-25                               cap, PO,           l



     hours oral      15:07:                               Daily, 0           Her

hernandes



     capsule,      00                                 Refill(s)           



     extended                                                        



     release                                                        

 

     Acetaminoph      2019      Yes                      1 tab, PO,           

[facility]



     en 325 MG /      0-25                               Q6H, 0           l



     Hydrocodone      15:07:                               Refill(s)           H

ermann



     Bitartrate      00                                                



     5 MG Oral                                                        



     Tablet                                                        



     [Norco                                                        



     5/325]                                                        

 

     apixaban 5      2019      Yes                      5 mg, PO,           Me

moria



     MG Oral      0-25                               Q12H, 0           l



     Tablet      15:07:                               Refill(s)           Donny

n



     [Eliquis]      00                                                

 

     metoprolol      2019      Yes                      50 mg = 1           Me

[facility]ia



     tartrate 50      0-25                               tab, PO,           l



     mg oral      15:07:                               BID, # 180           [facility]

daryl



     tablet      00                                 tab, 0           



                                                  Refill(s)           

 

     Diltiazem      2019      Yes                      180 mg = 1           Me

[facility]ia



     Hydrochlori      0-25                               cap, PO,           l



     de       15:07:                               Daily, #           [facility]

daryl



     mg/24 hours      00                                 30 cap, 0           



     oral                                         Refill(s)           



     capsule,                                                        



     extended                                                        



     release                                                        

 

     tramadol      2019      Yes                      150 mg = 1           Mem

[facility]



     150 mg/24      0-25                               cap, PO,           l



     hours oral      15:07:                               Daily, 0           Her

hernandes



     capsule,      00                                 Refill(s)           



     extended                                                        



     release                                                        

 

     Acetaminoph      2019      Yes                      1 tab, PO,           

[facility]



     en 325 MG /      0-25                               Q6H, 0           l



     Hydrocodone      15:07:                               Refill(s)           H

[facility]



     Bitartrate      00                                                



     5 MG Oral                                                        



     Tablet                                                        



     [Norco                                                        



     5/325]                                                        

 

     apixaban 5      2019      Yes                      5 mg, PO,           [facility]

[facility]



     MG Oral      0-25                               Q12H, 0           l



     Tablet      15:07:                               Refill(s)           Donny

n



     [Eliquis]      00                                                

 

     metoprolol      2019      Yes                      50 mg = 1           [facility]

[facility]



     tartrate 50      0-25                               tab, PO,           l



     mg oral      15:07:                               BID, # 180           [facility]

daryl



     tablet      00                                 tab, 0           



                                                  Refill(s)           

 

     Diltiazem      2019      Yes                      180 mg = 1           [facility]

[facility]



     Hydrochlori      0-25                               cap, PO,           l



     de       15:07:                               Daily, #           [facility]

d[facility]



     mg/24 hours      00                                 30 cap, 0           



     oral                                         Refill(s)           



     capsule,                                                        



     extended                                                        



     release                                                        

 

     tramadol      2019      Yes                      150 mg = 1           [facility]

[facility]



     150 mg/24      0-25                               cap, PO,           l



     hours oral      15:07:                               Daily, 0           Her

[facility]



     capsule,      00                                 Refill(s)           



     extended                                                        



     release                                                        

 

     Acetaminoph      2019      Yes                      1 tab, PO,           

[facility]



     en 325 MG /      0-25                               Q6H, 0           l



     Hydrocodone      15:07:                               Refill(s)           [facility]

[facility]



     Bitartrate      00                                                



     5 MG Oral                                                        



     Tablet                                                        



     [Norco                                                        



     5/325]                                                        

 

     apixaban 5      2019      Yes                      5 mg, PO,           [facility]

[facility]



     MG Oral      0-25                               Q12H, 0           l



     Tablet      15:07:                               Refill(s)           Donny

n



     [Eliquis]      00                                                

 

     metoprolol      2019      Yes                      50 mg = 1           [facility]

[facility]



     tartrate 50      0-25                               tab, PO,           l



     mg oral      15:07:                               BID, # 180           [facility]

d[facility]



     tablet      00                                 tab, 0           



                                                  Refill(s)           

 

     Diltiazem      2019      Yes                      180 mg = 1           [facility]

[facility]



     Hydrochlori      0-25                               cap, PO,           l



     de       15:07:                               Daily, #           [facility]

[facility]



     mg/24 hours      00                                 30 cap, 0           



     oral                                         Refill(s)           



     capsule,                                                        



     extended                                                        



     release                                                        

 

     tramadol      2019      Yes                      150 mg = 1           [facility]

[facility]



     150 mg/24      0-25                               cap, PO,           l



     hours oral      15:07:                               Daily, 0           Her

[facility]



     capsule,      00                                 Refill(s)           



     extended                                                        



     release                                                        

 

     Acetaminoph      2019      Yes                      1 tab, PO,           

[facility]



     en 325 MG /      0-25                               Q6H, 0           l



     Hydrocodone      15:07:                               Refill(s)           [facility]

[facility]



     Bitartrate      00                                                



     5 MG Oral                                                        



     Tablet                                                        



     [Norco                                                        



     5/325]                                                        

 

     apixaban 5      2019      Yes                      5 mg, PO,           [facility]

[facility]



     MG Oral      0-25                               Q12H, 0           l



     Tablet      15:07:                               Refill(s)           Donny martinez



     [Eliquis]      00                                                

 

     metoprolol      2019      Yes                      50 mg = 1           [facility]

[facility]



     tartrate 50      0-25                               tab, PO,           l



     mg oral      15:07:                               BID, # 180           [facility]

[facility]



     tablet      00                                 tab, 0           



                                                  Refill(s)           

 

     Diltiazem      2019      Yes                      180 mg = 1           [facility]

[facility]



     Hydrochlori      0-25                               cap, PO,           l



     de       15:07:                               Daily, #           [facility]

d[facility]



     mg/24 hours      00                                 30 cap, 0           



     oral                                         Refill(s)           



     capsule,                                                        



     extended                                                        



     release                                                        

 

     tramadol      2019      Yes                      150 mg = 1           [facility]

[facility]



     150 mg/24      0-25                               cap, PO,           l



     hours oral      15:07:                               Daily, 0           Her

[facility]



     capsule,      00                                 Refill(s)           



     extended                                                        



     release                                                        

 

     Acetaminoph      2019      Yes                      1 tab, PO,           

[facility]



     en 325 MG /      0-25                               Q6H, 0           l



     Hydrocodone      15:07:                               Refill(s)           [facility]

[facility]



     Bitartrate      00                                                



     5 MG Oral                                                        



     Tablet                                                        



     [Norco                                                        



     5/325]                                                        

 

     apixaban 5      2019      Yes                      5 mg, PO,           [facility]

[facility]



     MG Oral      0-25                               Q12H, 0           l



     Tablet      15:07:                               Refill(s)           Donny

n



     [Eliquis]      00                                                

 

     metoprolol      2019      Yes                      50 mg = 1           [facility]

[facility]



     tartrate 50      0-25                               tab, PO,           l



     mg oral      15:07:                               BID, # 180           [facility]

adryl



     tablet      00                                 tab, 0           



                                                  Refill(s)           

 

     Diltiazem      2019      Yes                      180 mg = 1           [facility]

[facility]



     Hydrochlori      0-25                               cap, PO,           l



     de       15:07:                               Daily, #           [facility]

[facility]



     mg/24 hours      00                                 30 cap, 0           



     oral                                         Refill(s)           



     capsule,                                                        



     extended                                                        



     release                                                        

 

     tramadol      2019      Yes                      150 mg = 1           [facility]

[facility]



     150 mg/24      0-25                               cap, PO,           l



     hours oral      15:07:                               Daily, 0           Her

hern[facility]



     capsule,      00                                 Refill(s)           



     extended                                                        



     release                                                        

 

     Acetaminoph      2019      Yes                      1 tab, PO,           

[facility]



     en 325 MG /      0-25                               Q6H, 0           l



     Hydrocodone      15:07:                               Refill(s)           [facility]

[facility]



     Bitartrate      00                                                



     5 MG Oral                                                        



     Tablet                                                        



     [Norco                                                        



     5/325]                                                        

 

     apixaban 5      2019      Yes                      5 mg, PO,           [facility]

[facility]



     MG Oral      0-25                               Q12H, 0           l



     Tablet      15:07:                               Refill(s)           Don[facility] martinez



     [Eliquis]      00                                                

 

     metoprolol      2019      Yes                      50 mg = 1           Me

[facility]



     tartrate 50      0-25                               tab, PO,           l



     mg oral      15:07:                               BID, # 180           [facility]

d[facility]



     tablet      00                                 tab, 0           



                                                  Refill(s)           

 

     Diltiazem      2019      Yes                      180 mg = 1           Me

[facility]



     Hydrochlori      0-25                               cap, PO,           l



     de       15:07:                               Daily, #           [facility]

daryl



     mg/24 hours      00                                 30 cap, 0           



     oral                                         Refill(s)           



     capsule,                                                        



     extended                                                        



     release                                                        

 

     tramadol      2019      Yes                      150 mg = 1           [facility]

[facility]



     150 mg/24      0-25                               cap, PO,           l



     hours oral      15:07:                               Daily, 0           Her

hernand[facility]



     capsule,      00                                 Refill(s)           



     extended                                                        



     release                                                        

 

     Acetaminoph      2019      Yes                      1 tab, PO,           

[facility]



     en 325 MG /      0-25                               Q6H, 0           l



     Hydrocodone      15:07:                               Refill(s)           H

[facility]



     Bitartrate      00                                                



     5 MG Oral                                                        



     Tablet                                                        



     [Norco                                                        



     5/325]                                                        

 

     apixaban 5      2019      Yes                      5 mg, PO,           Me

[facility]



     MG Oral      0-25                               Q12H, 0           l



     Tablet      15:07:                               Refill(s)           Donny

n



     [Eliquis]      00                                                

 

     metoprolol      2019      Yes                      50 mg = 1           Me

[facility]



     tartrate 50      0-25                               tab, PO,           l



     mg oral      15:07:                               BID, # 180           [facility]

d[facility]



     tablet      00                                 tab, 0           



                                                  Refill(s)           

 

     Diltiazem      2019      Yes                      180 mg = 1           Me

[facility]



     Hydrochlori      0-25                               cap, PO,           l



     de       15:07:                               Daily, #           [facility]

d[facility]



     mg/24 hours      00                                 30 cap, 0           



     oral                                         Refill(s)           



     capsule,                                                        



     extended                                                        



     release                                                        

 

     tramadol      2019      Yes                      150 mg = 1           [facility]

[facility]



     150 mg/24      0-25                               cap, PO,           l



     hours oral      15:07:                               Daily, 0           Her

hernandes



     capsule,      00                                 Refill(s)           



     extended                                                        



     release                                                        

 

     Acetaminoph      2019      Yes                      1 tab, PO,           

[facility]



     en 325 MG /      0-25                               Q6H, 0           l



     Hydrocodone      15:07:                               Refill(s)           H

[facility]



     Bitartrate      00                                                



     5 MG Oral                                                        



     Tablet                                                        



     [Norco                                                        



     5/325]                                                        

 

     calcitriol      2019      Yes                      = 1 cap,           [facility]

[facility]



     0.25 mcg      0-11                               PO, Daily,           l



     oral      21:10:                               # 90           [place]



     capsule      30                                 unknown           



                                                  unit,           



                                                  Pharmacy:           



                                                  [facility]           



                                                  #[id number]           

 

     calcitriol      2019      Yes                      = 1 cap,           [facility]

[facility]



     0.25 mcg      0-11                               PO, Daily,           l



     oral      21:10:                               # 90           [place]



     capsule      30                                 unknown           



                                                  unit,           



                                                  Pharmacy:           



                                                  [facility]           



                                                  #[id number]           

 

     calcitriol      2019      Yes                      = 1 cap,           [facility]

[facility]



     0.25 mcg      0-11                               PO, Daily,           l



     oral      21:10:                               # 90           [place]



     capsule      30                                 unknown           



                                                  unit,           



                                                  Pharmacy:           



                                                  [facility]           



                                                  #[id number]           

 

     calcitriol      2019      Yes                      = 1 cap,           [facility]

[facility]



     0.25 mcg      0-11                               PO, Daily,           l



     oral      21:10:                               # 90           [place]



     capsule      30                                 unknown           



                                                  unit,           



                                                  Pharmacy:           



                                                  [facility]           



                                                  #[id number]           

 

     calcitriol      2019      Yes                      = 1 cap,           [facility]

[facility]



     0.25 mcg      0-11                               PO, Daily,           l



     oral      21:10:                               # 90           [place]



     capsule      30                                 unknown           



                                                  unit,           



                                                  Pharmacy:           



                                                  [facility]           



                                                  #[id number]           

 

     calcitriol      2019      Yes                      = 1 cap,           [facility]

[facility]



     0.25 mcg      0-11                               PO, Daily,           l



     oral      21:10:                               # 90           [place]



     capsule      30                                 unknown           



                                                  unit,           



                                                  Pharmacy:           



                                                  [facility]           



                                                  #[id number]           

 

     calcitriol      2019      Yes                      = 1 cap,           [facility]

[facility]



     0.25 mcg      0-11                               PO, Daily,           l



     oral      21:10:                               # 90           [place]



     capsule      30                                 unknown           



                                                  unit,           



                                                  Pharmacy:           



                                                  [facility]           



                                                  [facility]           



                                                  #[id number]           

 

     calcitriol      2019-      Yes                      = 1 cap,           [facility]

[facility]



     0.25 mcg      0-11                               PO, Daily,           l



     oral      21:10:                               # 90           [place]



     capsule      30                                 unknown           



                                                  unit,           



                                                  Pharmacy:           



                                                  [facility]           



                                                  [facility]           



                                                  #[id number]           

 

     calcitriol      2019-      Yes                      = 1 cap,           [facility]

[facility]



     0.25 mcg      0-11                               PO, Daily,           l



     oral      21:10:                               # 90           [place]



     capsule      30                                 unknown           



                                                  unit,           



                                                  Pharmacy:           



                                                  [facility]           



                                                  [facility]           



                                                  #[id number]           

 

     calcitriol      2019-      Yes                      = 1 cap,           [facility]

[facility]



     0.25 mcg      0-11                               PO, Daily,           l



     oral      21:10:                               # 90           [place]



     capsule      30                                 unknown           



                                                  unit,           



                                                  Pharmacy:           



                                                  [facility]           



                                                  [facility]           



                                                  #[id number]           

 

     gabapentin      2019-0      Yes                      300 mg = 1           M

[facility]



     300 MG Oral      9-27                               cap, PO,           l



     Capsule      20:54:                               BID, # 180           [facility]

daryl



               00                                 cap, 3           



                                                  Refill(s),           



                                                  called to           



                                                  pharmacy           

 

     gabapentin      2019-0      Yes                      300 mg = 1           [facility]

[facility]



     300 MG Oral      9-27                               cap, PO,           l



     Capsule      20:54:                               BID, # 180           [facility]

d[facility]



               00                                 cap, 3           



                                                  Refill(s),           



                                                  called to           



                                                  pharmacy           

 

     gabapentin      2019-0      Yes                      300 mg = 1           [facility]

[facility]



     300 MG Oral      9-27                               cap, PO,           l



     Capsule      20:54:                               BID, # 180           [facility]

d[facility]



               00                                 cap, 3           



                                                  Refill(s),           



                                                  called to           



                                                  pharmacy           

 

     gabapentin      2019-0      Yes                      300 mg = 1           [facility]

[facility]



     300 MG Oral      9-27                               cap, PO,           l



     Capsule      20:54:                               BID, # 180           [facility]

daryl



               00                                 cap, 3           



                                                  Refill(s),           



                                                  called to           



                                                  pharmacy           

 

     gabapentin      2019-0      Yes                      300 mg = 1           [facility]

[facility]



     300 MG Oral      9-27                               cap, PO,           l



     Capsule      20:54:                               BID, # 180           Herm

daryl



               00                                 cap, 3           



                                                  Refill(s),           



                                                  called to           



                                                  pharmacy           

 

     gabapentin      2019-0      Yes                      300 mg = 1           M

emoria



     300 MG Oral      9-27                               cap, PO,           l



     Capsule      20:54:                               BID, # 180           Herm

daryl



               00                                 cap, 3           



                                                  Refill(s),           



                                                  called to           



                                                  pharmacy           

 

     gabapentin      2019-0      Yes                      300 mg = 1           M

emoria



     300 MG Oral      9-27                               cap, PO,           l



     Capsule      20:54:                               BID, # 180           Herm

daryl



               00                                 cap, 3           



                                                  Refill(s),           



                                                  called to           



                                                  pharmacy           

 

     gabapentin      2019-0      Yes                      300 mg = 1           M

emoria



     300 MG Oral      9-27                               cap, PO,           l



     Capsule      20:54:                               BID, # 180           Herm

daryl



               00                                 cap, 3           



                                                  Refill(s),           



                                                  called to           



                                                  pharmacy           

 

     gabapentin      2019-0      Yes                      300 mg = 1           M

emoria



     300 MG Oral      9-27                               cap, PO,           l



     Capsule      20:54:                               BID, # 180           Herm

daryl



               00                                 cap, 3           



                                                  Refill(s),           



                                                  called to           



                                                  pharmacy           

 

     gabapentin      2019-0      Yes                      300 mg = 1           M

emoria



     300 MG Oral      9-27                               cap, PO,           l



     Capsule      20:54:                               BID, # 180           Herm

daryl



               00                                 cap, 3           



                                                  Refill(s),           



                                                  called to           



                                                  pharmacy           

 

     allopurinol      2019-0      Yes                      = 1 tab,           Me

moria



     300 mg oral      8-17                               PO, Daily,           l



     tablet      02:39:                               # 90 tab,           Donny

n



                                                Pharmacy:           



                                                  [facility]           



                                                  #[id number]           

 

     allopurinol      2019-0      Yes                      = 1 tab,           Me

moria



     300 mg oral      8-17                               PO, Daily,           l



     tablet      02:39:                               # 90 tab,           Donny

n



                                                [facility]           



                                                  [facility]           



                                                  #[id number]           

 

     allopurinol      2019-0      Yes                      = 1 tab,           Me

moria



     300 mg oral      8-17                               PO, Daily,           l



     tablet      02:39:                               # 90 tab,           Donny

n



                                                [facility]           



                                                  [facility]           



                                                  #[id number]           

 

     allopurinol      2019-0      Yes                      = 1 tab,           Me

moria



     300 mg oral      8-17                               PO, Daily,           l



     tablet      02:39:                               # 90 tab,           Donny

n



                                                Ph[facility]           



                                                  [facility]           



                                                  [id number]           

 

     allopurinol      2019-0      Yes                      = 1 tab,           Me

moria



     300 mg oral      8-17                               PO, Daily,           l



     tablet      02:39:                               # 90 tab,           Donny

n



                                                Pharmacy:           



                                                  [facility]           



                                                  [id number]           

 

     allopurinol      2019-0      Yes                      = 1 tab,           Me

moria



     300 mg oral      8-17                               PO, Daily,           l



     tablet      02:39:                               # 90 tab,           Donny

n



                                                Pharmacy:           



                                                  [facility]           



                                                  [id number]           

 

     allopurinol      2019-0      Yes                      = 1 tab,           Me

moria



     300 mg oral      8-17                               PO, Daily,           l



     tablet      02:39:                               # 90 tab,           Donny

n



               31                                 Pharmacy:           



                                                  [facility]           



                                                  [facility]           



                                                  #[id number]           

 

     allopurinol      2019-0      Yes                      = 1 tab,           Me

moria



     300 mg oral      8-17                               PO, Daily,           l



     tablet      02:39:                               # 90 tabDonny

n



               31                                 Pharmacy:           



                                                  [facility]           



                                                  [facility]           



                                                  #[id number]           

 

     allopurinol      2019-0      Yes                      = 1 tab,           Me

moria



     300 mg oral      8-17                               PO, Daily,           l



     tablet      02:39:                               # 90 tabDonny

n



               31                                 Pharmacy:           



                                                  [facility]           



                                                  [facility]           



                                                  #[id number]           

 

     allopurinol      2019-0      Yes                      = 1 tab,           Me

moria



     300 mg oral      8-17                               PO, Daily,           l



     tablet      02:39:                               # 90 tabDonny

n



               31                                 Pharmacy:           



                                                  [facility]           



                                                  [facility]           



                                                  #[id number]           

 

     QUEtiapine      2019-0      Yes                      50 mg = 1           Me

moria



     50 mg oral      6-06                               tab, PO,           l



     tablet      15:28:                               Bedtime, #           Meredith

nn



               00                                 30 tab, 1           



                                                  Refill(s)           

 

     QUEtiapine      2019-0      Yes                      50 mg = 1           Me

moria



     50 mg oral      6-06                               tab, PO,           l



     tablet      15:28:                               Bedtime, #           Meredith

nn



               00                                 30 tab, 1           



                                                  Refill(s)           

 

     QUEtiapine      2019-0      Yes                      50 mg = 1           Me

moria



     50 mg oral      6-06                               tab, PO,           l



     tablet      15:28:                               Bedtime, #           Meredith

nn



               00                                 30 tab, 1           



                                                  Refill(s)           

 

     QUEtiapine      2019-0      Yes                      50 mg = 1           Me

moria



     50 mg oral      6-06                               tab, PO,           l



     tablet      15:28:                               Bedtime, #           Meredith

nn



               00                                 30 tab, 1           



                                                  Refill(s)           

 

     QUEtiapine      2019-0      Yes                      50 mg = 1           Me

moria



     50 mg oral      6-06                               tab, PO,           l



     tablet      15:28:                               Bedtime, #           Meredith

nn



               00                                 30 tab, 1           



                                                  Refill(s)           

 

     QUEtiapine      2019-0      Yes                      50 mg = 1           Me

moria



     50 mg oral      6-06                               tab, PO,           l



     tablet      15:28:                               Bedtime, #           Meredith

nn



               00                                 30 tab, 1           



                                                  Refill(s)           

 

     QUEtiapine      2019-0      Yes                      50 mg = 1           Me

moria



     50 mg oral      6-06                               tab, PO,           l



     tablet      15:28:                               Bedtime, #           Meredith

nn



               00                                 30 tab, 1           



                                                  Refill(s)           

 

     QUEtiapine      2019-0      Yes                      50 mg = 1           Me

moria



     50 mg oral      6-06                               tab, PO,           l



     tablet      15:28:                               Bedtime, #           Meredith

nn



               00                                 30 tab, 1           



                                                  Refill(s)           

 

     QUEtiapine      2019-0      Yes                      50 mg = 1           Me

moria



     50 mg oral      6-06                               tab, PO,           l



     tablet      15:28:                               Bedtime, #           Meredith

nn



               00                                 30 tab, 1           



                                                  Refill(s)           

 

     QUEtiapine      2019-0      Yes                      50 mg = 1           Me

moria



     50 mg oral      6-06                               tab, PO,           l



     tablet      15:28:                               Bedtime, #           Meredith

nn



               00                                 30 tab, 1           



                                                  Refill(s)           

 

     eletriptan      2019-      Yes                      40 mg = 1           Me

moria



     40 mg oral      6-06                               tab, PO,           l



     tablet      15:26:                               Daily, PRN           Meredith

nn



               00                                 for            



                                                  migraine           



                                                  headache,           



                                                  may repeat           



                                                  dose once           



                                                  in 2           



                                                  hours, # 6           



                                                  tab, 0           



                                                  Refill(s)           

 

     eletriptan      -      Yes                      40 mg = 1           Me

moria



     40 mg oral      6-06                               tab, PO,           l



     tablet      15:26:                               Daily, PRN           Meredith

nn



               00                                 for            



                                                  migraine           



                                                  headache,           



                                                  may repeat           



                                                  dose once           



                                                  in 2           



                                                  hours, # 6           



                                                  tab, 0           



                                                  Refill(s)           

 

     eletriptan      -      Yes                      40 mg = 1           Me

moria



     40 mg oral      6-06                               tab, PO,           l



     tablet      15:26:                               Daily, PRN           Meredith

nn



               00                                 for            



                                                  migraine           



                                                  headache,           



                                                  may repeat           



                                                  dose once           



                                                  in 2           



                                                  hours, # 6           



                                                  tab, 0           



                                                  Refill(s)           

 

     eletriptan      2019-0      Yes                      40 mg = 1           Me

moria



     40 mg oral      6-06                               tab, PO,           l



     tablet      15:26:                               Daily, PRN           Meredith

nn



               00                                 for            



                                                  migraine           



                                                  headache,           



                                                  may repeat           



                                                  dose once           



                                                  in 2           



                                                  hours, # 6           



                                                  tab, 0           



                                                  Refill(s)           

 

     eletriptan      2019-      Yes                      40 mg = 1           Me

moria



     40 mg oral      6-06                               tab, PO,           l



     tablet      15:26:                               Daily, PRN           Meredith

nn



               00                                 for            



                                                  migraine           



                                                  headache,           



                                                  may repeat           



                                                  dose once           



                                                  in 2           



                                                  hours, # 6           



                                                  tab, 0           



                                                  Refill(s)           

 

     eletriptan      2019-0      Yes                      40 mg = 1           Me

moria



     40 mg oral      6-06                               tab, PO,           l



     tablet      15:26:                               Daily, PRN           Meredith

nn



               00                                 for            



                                                  migraine           



                                                  headache,           



                                                  may repeat           



                                                  dose once           



                                                  in 2           



                                                  hours, # 6           



                                                  tab, 0           



                                                  Refill(s)           

 

     eletriptan      2019-0      Yes                      40 mg = 1           Me

moria



     40 mg oral      6-06                               tab, PO,           l



     tablet      15:26:                               Daily, PRN           Meredith

nn



               00                                 for            



                                                  migraine           



                                                  headache,           



                                                  may repeat           



                                                  dose once           



                                                  in 2           



                                                  hours, # 6           



                                                  tab, 0           



                                                  Refill(s)           

 

     eletriptan            Yes                      40 mg = 1           Me

moria



     40 mg oral      6-06                               tab, PO,           l



     tablet      15:26:                               Daily, PRN           Meredith

nn



               00                                 for            



                                                  migraine           



                                                  headache,           



                                                  may repeat           



                                                  dose once           



                                                  in 2           



                                                  hours, # 6           



                                                  tab, 0           



                                                  Refill(s)           

 

     eletriptan            Yes                      40 mg = 1           Me

moria



     40 mg oral      6-06                               tab, PO,           l



     tablet      15:26:                               Daily, PRN           Meredith

nn



               00                                 for            



                                                  migraine           



                                                  headache,           



                                                  may repeat           



                                                  dose once           



                                                  in 2           



                                                  hours, # 6           



                                                  tab, 0           



                                                  Refill(s)           

 

     eletriptan            Yes                      40 mg = 1           Me

moria



     40 mg oral      6-06                               tab, PO,           l



     tablet      15:26:                               Daily, PRN           Meredith

nn



               00                                 for            



                                                  migraine           



                                                  headache,           



                                                  may repeat           



                                                  dose once           



                                                  in 2           



                                                  hours, # 6           



                                                  tab, 0           



                                                  Refill(s)           

 

     atorvastati            Yes                      See            [facility]

a



     n 40 mg      3-14                               Instructio           l



     oral tablet      15:07:                               ns, TAKE 1           

[place]



               35                                 TABLET BY           



                                                  MOUTH           



                                                  EVERY           



                                                  NIGHT AT           



                                                  BEDTIME, #           



                                                  90 tab, 1           



                                                  Refill(s),           



                                                  Pharmacy:           



                                                  [facility]           



                                                  Drug Store           



                                                  [facility]           

 

     atorvastati            Yes                      See            Memori

a



     n 40 mg      3-14                               Instructio           l



     oral tablet      15:07:                               ns, TAKE 1           

[place]



               35                                 TABLET BY           



                                                  MOUTH           



                                                  EVERY           



                                                  NIGHT AT           



                                                  BEDTIME, #           



                                                  90 tab, 1           



                                                  Refill(s),           



                                                  Pharmacy:           



                                                  [facility]           



                                                  Drug Store           



                                                  [facility]           

 

     atorvastati            Yes                      See            [facility]

a



     n 40 mg      3-14                               Instructio           l



     oral tablet      15:07:                               ns, TAKE 1           

[place]



               35                                 TABLET BY           



                                                  MOUTH           



                                                  EVERY           



                                                  NIGHT AT           



                                                  BEDTIME, #           



                                                  90 tab, 1           



                                                  Refill(s),           



                                                  Pharmacy:           



                                                  [facility]           



                                                  Drug Store           



                                                  [facility]           

 

     atorvastati            Yes                      See            [facility]

a



     n 40 mg      3-14                               Instructio           l



     oral tablet      15:07:                               ns, TAKE 1           

[place]



               35                                 TABLET BY           



                                                  MOUTH           



                                                  EVERY           



                                                  NIGHT AT           



                                                  BEDTIME, #           



                                                  90 tab, 1           



                                                  Refill(s),           



                                                  Pharmacy:           



                                                  [facility]           



                                                  Drug Store           



                                                  [facility]           

 

     [id number]            Yes                      See            [facility]

a



     n 40 mg      3-14                               Instructio           l



     oral tablet      15:07:                               ns, TAKE 1           

[place]



               35                                 TABLET BY           



                                                  MOUTH           



                                                  EVERY           



                                                  NIGHT AT           



                                                  BEDTIME, #           



                                                  90 tab, 1           



                                                  Refill(s),           



                                                  Pharmacy:           



                                                  [address]            Yes                      See            [facility]

a



     n 40 mg      3-14                               Instructio           l



     oral tablet      15:07:                               ns, TAKE 1           

[place]



               35                                 TABLET BY           



                                                  MOUTH           



                                                  EVERY           



                                                  NIGHT AT           



                                                  BEDTIME, #           



                                                  90 tab, 1           



                                                  Refill(s),           



                                                  Pharmacy:           



                                                  [address]            Yes                      See            [facility]

a



     n 40 mg      3-14                               Instructio           l



     oral tablet      15:07:                               ns, TAKE 1           

[place]



               35                                 TABLET BY           



                                                  MOUTH           



                                                  EVERY           



                                                  NIGHT AT           



                                                  BEDTIME, #           



                                                  90 tab, 1           



                                                  Refill(s),           



                                                  Pharmacy:           



                                                  [address]            Yes                      See            [facility]

a



     n 40 mg      3-14                               Instructio           l



     oral tablet      15:07:                               ns, TAKE 1           

[place]



               35                                 TABLET BY           



                                                  MOUTH           



                                                  EVERY           



                                                  NIGHT AT           



                                                  BEDTIME, #           



                                                  90 tab, 1           



                                                  Refill(s),           



                                                  Pharmacy:           



                                                  [address]            Yes                      See            [facility]

a



     n 40 mg      3-14                               Instructio           l



     oral tablet      15:07:                               ns, TAKE 1           

[place]



               35                                 TABLET BY           



                                                  MOUTH           



                                                  EVERY           



                                                  NIGHT AT           



                                                  BEDTIME, #           



                                                  90 tab, 1           



                                                  Refill(s),           



                                                  Pharmacy:           



                                                  [address]            Yes                      See            [facility]

a



     n 40 mg      3-14                               Instructio           l



     oral tablet      15:07:                               ns, TAKE 1           

[place]



               35                                 TABLET BY           



                                                  MOUTH           



                                                  EVERY           



                                                  NIGHT AT           



                                                  BEDTIME, #           



                                                  90 tab, 1           



                                                  Refill(s),           



                                                  Pharmacy:           



                                                  [place] [address]           

 

     amLODIPine            Yes                      See            [facility]



     5 mg oral      3-14                               Instructio           l



     tablet      15:07:                               ns, TAKE 1           [place]

nn



               33                                 TABLET BY           



                                                  MOUTH           



                                                  EVERY DAY,           



                                                  # 90 tab,           



                                                  1              



                                                  Refill(s),           



                                                  Pharmacy:           



                                                  [place] [address]           

 

     amLODIPine            Yes                      See            [facility]



     5 mg oral      3-14                               Instructio           l



     tablet      15:07:                               ns, TAKE 1           [place]

[facility]



               33                                 TABLET BY           



                                                  MOUTH           



                                                  EVERY DAY,           



                                                  # 90 tab,           



                                                  1              



                                                  Refill(s),           



                                                  Pharmacy:           



                                                  [place] [address]           

 

     amLODIPine      2019-0      Yes                      See            [facility]



     5 mg oral      3-14                               Instructio           l



     tablet      15:07:                               ns, TAKE 1           [facility]

nn



               33                                 TABLET BY           



                                                  MOUTH           



                                                  EVERY DAY,           



                                                  # 90 tab,           



                                                  1              



                                                  Refill(s),           



                                                  Pharmacy:           



                                                  [place] [address]           

 

     amLODIPine            Yes                      See            [facility]



     5 mg oral      3-14                               Instructio           l



     tablet      15:07:                               ns, TAKE 1           [facility]

[facility]



               33                                 TABLET BY           



                                                  MOUTH           



                                                  EVERY DAY,           



                                                  # 90 tab,           



                                                  1              



                                                  Refill(s),           



                                                  Pharmacy:           



                                                  [place] [address]           

 

     amLODIPine            Yes                      See            [facility]



     5 mg oral      3-14                               Instructio           l



     tablet      15:07:                               ns, TAKE 1           [place]

[facility]



               33                                 TABLET BY           



                                                  MOUTH           



                                                  EVERY DAY,           



                                                  # 90 tab,           



                                                  1              



                                                  Refill(s),           



                                                  Pharmacy:           



                                                  [place] [address]           

 

     amLODIPine            Yes                      See            [facility]



     5 mg oral      3-14                               Instructio           l



     tablet      15:07:                               ns, TAKE 1           [place]

[facility]



               33                                 TABLET BY           



                                                  MOUTH           



                                                  EVERY DAY,           



                                                  # 90 tab,           



                                                  1              



                                                  Refill(s),           



                                                  Pharmacy:           



                                                  [facility]           



                                                  [facility]           



                                                  [id number]           

 

     amLODIPine            Yes                      See            [facility]



     5 mg oral      3-14                               Instructio           l



     tablet      15:07:                               ns, TAKE 1           [facility]

[facility]



               33                                 TABLET BY           



                                                  MOUTH           



                                                  EVERY DAY,           



                                                  # 90 tab,           



                                                  1              



                                                  Refill(s),           



                                                  Pharmacy:           



                                                  [facility]           



                                                  [facility]           



                                                  [id number]           

 

     amLODIPine            Yes                      See            [facility]



     5 mg oral      3-14                               Instructio           l



     tablet      15:07:                               ns, TAKE 1           [facility]

[facility]



               33                                 TABLET BY           



                                                  MOUTH           



                                                  EVERY DAY,           



                                                  # 90 tab,           



                                                  1              



                                                  Refill(s),           



                                                  Pharmacy:           



                                                  [facility]           



                                                  [facility]           



                                                  [id number]           

 

     amLODIPine            Yes                      See            [facility]



     5 mg oral      3-14                               Instructio           l



     tablet      15:07:                               ns, TAKE 1           [facility]

[facility]



               33                                 TABLET BY           



                                                  MOUTH           



                                                  EVERY DAY,           



                                                  # 90 tab,           



                                                  1              



                                                  Refill(s),           



                                                  Pharmacy:           



                                                  [place] [address]           

 

     amLODIPine            Yes                      See            [facility]



     5 mg oral      3-14                               Instructio           l



     tablet      15:07:                               ns, TAKE 1           [facility]

[facility]



               33                                 TABLET BY           



                                                  MOUTH           



                                                  EVERY DAY,           



                                                  # 90 tab,           



                                                  1              



                                                  Refill(s),           



                                                  Pharmacy:           



                                                  [place] [address]           

 

     lisinopril            Yes                      See            [facility]



     5 mg oral      8-21                               Instructio           l



     tablet      19:00:                               ns, TAKE 1           [facility]

[facility]



               30                                 TABLET BY           



                                                  MOUTH           



                                                  DAILY, #           



                                                  90 tab, 1           



                                                  Refill(s),           



                                                  Pharmacy:           



                                                  [facility]           



                                                  [facility]           



                                                  [id number]           

 

     lisinopril            Yes                      See            [facility]



     5 mg oral      8-21                               Instructio           l



     tablet      19:00:                               ns, TAKE 1           [facility]

[facility]



               30                                 TABLET BY           



                                                  MOUTH           



                                                  DAILY, #           



                                                  90 tab, 1           



                                                  Refill(s),           



                                                  Pharmacy:           



                                                  [place] [address]           

 

     lisinopril            Yes                      See            [facility]



     5 mg oral      8-21                               Instructio           l



     tablet      19:00:                               ns, TAKE 1           [facility]

[facility]



               30                                 TABLET BY           



                                                  MOUTH           



                                                  DAILY, #           



                                                  90 tab, 1           



                                                  Refill(s),           



                                                  Pharmacy:           



                                                  [facility]           



                                                  [facility]           



                                                  [id number]           

 

     lisinopril            Yes                      See            [facility]



     5 mg oral      8-21                               Instructio           l



     tablet      19:00:                               ns, TAKE 1           [facility]

[facility]



               30                                 TABLET BY           



                                                  MOUTH           



                                                  DAILY, #           



                                                  90 tab, 1           



                                                  Refill(s),           



                                                  Pharmacy:           



                                                  [place] [address]           

 

     lisinopril            Yes                      See            [facility]



     5 mg oral      8-21                               Instructio           l



     tablet      19:00:                               ns, TAKE 1           [facility]

[facility]



               30                                 TABLET BY           



                                                  MOUTH           



                                                  DAILY, #           



                                                  90 tab, 1           



                                                  Refill(s),           



                                                  Pharmacy:           



                                                  [facility]           



                                                  [facility]           



                                                  [id number]           

 

     lisinopril            Yes                      See            [facility]



     5 mg oral      8-21                               Instructio           l



     tablet      19:00:                               ns, TAKE 1           [facility]

[facility]



               30                                 TABLET BY           



                                                  MOUTH           



                                                  DAILY, #           



                                                  90 tab, 1           



                                                  Refill(s),           



                                                  Pharmacy:           



                                                  [facility]           



                                                  [facility]           



                                                  [id number]           

 

     lisinopril            Yes                      See            [facility]



     5 mg oral      8-21                               Instructio           l



     tablet      19:00:                               ns, TAKE 1           [facility]

[facility]



               30                                 TABLET BY           



                                                  MOUTH           



                                                  DAILY, #           



                                                  90 tab, 1           



                                                  Refill(s),           



                                                  Pharmacy:           



                                                  [facility]           



                                                  [facility]           



                                                  [id number]           

 

     lisinopril            Yes                      See            [facility]



     5 mg oral      8-21                               Instructio           l



     tablet      19:00:                               ns, TAKE 1           [facility]

[facility]



               30                                 TABLET BY           



                                                  MOUTH           



                                                  DAILY, #           



                                                  90 tab, 1           



                                                  Refill(s),           



                                                  Pharmacy:           



                                                  [facility]           



                                                  [facility]           



                                                  [id number]           

 

     lisinopril            Yes                      See            [facility]



     5 mg oral      8-21                               Instructio           l



     tablet      19:00:                               ns, TAKE 1           [facility]

nn



               30                                 TABLET BY           



                                                  MOUTH           



                                                  DAILY, #           



                                                  90 tab, 1           



                                                  Refill(s),           



                                                  Pharmacy:           



                                                  [facility] [place]           



                                                  [facility]           



                                                  [id number]           

 

     lisinopril            Yes                      See            [facility]



     5 mg oral      8-21                               [facility]           l



     tablet      19:00:                               ns, TAKE 1           [facility]

nn



               30                                 TABLET BY           



                                                  MOUTH           



                                                  DAILY, #           



                                                  90 tab, 1           



                                                  Refill(s),           



                                                  Pharmacy:           



                                                  [facility] [place]           



                                                  [facility] [address]            Yes                      See            [facility]

a



     n 40 mg      8-21                               In[facility]           l



     oral tablet      18:50:                               ns, TAKE 1           

[place]



               45                                 TABLET BY           



                                                  MOUTH           



                                                  EVERY           



                                                  NIGHT AT           



                                                  BEDTIME, #           



                                                  30 tab, 5           



                                                  Refill(s),           



                                                  Pharmacy:           



                                                  [address]            Yes                      See            [facility]

a



     n 40 mg      8-21                               Instruct[facility]           l



     oral tablet      18:50:                               ns, TAKE 1           

[place]



               45                                 TABLET BY           



                                                  MOUTH           



                                                  EVERY           



                                                  NIGHT AT           



                                                  BEDTIME, #           



                                                  30 tab, 5           



                                                  Refill(s),           



                                                  Pharmacy:           



                                                  [facility] [place]           



                                                  [facility] [address]            Yes                      See            [facility]

a



     n 40 mg      8-21                               Instructio           l



     oral tablet      18:50:                               ns, TAKE 1           

[place]



               45                                 TABLET BY           



                                                  MOUTH           



                                                  EVERY           



                                                  NIGHT AT           



                                                  BEDTIME, #           



                                                  30 tab, 5           



                                                  Refill(s),           



                                                  Pharmacy:           



                                                  [facility] [place]           



                                                  [facility] [address]            Yes                      See            [facility]

a



     n 40 mg      8-21                               Instructio           l



     oral tablet      18:50:                               ns, TAKE 1           

[place]



               45                                 TABLET BY           



                                                  MOUTH           



                                                  EVERY           



                                                  NIGHT AT           



                                                  BEDTIME, #           



                                                  30 tab, 5           



                                                  Refill(s),           



                                                  Pharmacy:           



                                                  [facility] [place]           



                                                  [facility] [address]            Yes                      See            [facility]

a



     n 40 mg      8-21                               Instructio           l



     oral tablet      18:50:                               ns, TAKE 1           

[place]



               45                                 TABLET BY           



                                                  MOUTH           



                                                  EVERY           



                                                  NIGHT AT           



                                                  BEDTIME, #           



                                                  30 tab, 5           



                                                  Refill(s),           



                                                  Pharmacy:           



                                                  [facility] [place]           



                                                  [facility] [address]            Yes                      See            [facility]

a



     n 40 mg      8-21                               Instructio           l



     oral tablet      18:50:                               ns, TAKE 1           

[place]



               45                                 TABLET BY           



                                                  MOUTH           



                                                  EVERY           



                                                  NIGHT AT           



                                                  BEDTIME, #           



                                                  30 tab, 5           



                                                  Refill(s),           



                                                  Pharmacy:           



                                                  [facility] [place]           



                                                  [facility] [address]            Yes                      See            [facility]

a



     n 40 mg      8-21                               Instructio           l



     oral tablet      18:50:                               ns, TAKE 1           

[place]



               45                                 TABLET BY           



                                                  MOUTH           



                                                  EVERY           



                                                  NIGHT AT           



                                                  BEDTIME, #           



                                                  30 tab, 5           



                                                  Refill(s),           



                                                  Pharmacy:           



                                                  [facility] [place]           



                                                  [facility] [address]            Yes                      See            [facility]

a



     n 40 mg      8-21                               Instructio           l



     oral tablet      18:50:                               ns, TAKE 1           

[place]



               45                                 TABLET BY           



                                                  MOUTH           



                                                  EVERY           



                                                  NIGHT AT           



                                                  BEDTIME, #           



                                                  30 tab, 5           



                                                  Refill(s),           



                                                  Pharmacy:           



                                                  [place] [address]           

 

     atorvastati            Yes                      See            [facility]

a



     n 40 mg      8-21                               Instructio           l



     oral tablet      18:50:                               ns, TAKE 1           

[place]



               45                                 TABLET BY           



                                                  MOUTH           



                                                  EVERY           



                                                  NIGHT AT           



                                                  BEDTIME, #           



                                                  30 tab, 5           



                                                  Refill(s),           



                                                  Pharmacy:           



                                                  [place] [address]           

 

     atorvastati            Yes                      See            [facility]

a



     n 40 mg      8-21                               Instructio           l



     oral tablet      18:50:                               ns, TAKE 1           

[place]



               45                                 TABLET BY           



                                                  MOUTH           



                                                  EVERY           



                                                  NIGHT AT           



                                                  BEDTIME, #           



                                                  30 tab, 5           



                                                  Refill(s),           



                                                  Pharmacy:           



                                                  [place] [address]           

 

     amLODIPine            Yes                      See            [facility]



     5 mg oral      8-21                               Instruct[facility]           l



     tablet      18:50:                               ns, TAKE 1           [facility]

nn



               41                                 TABLET BY           



                                                  MOUTH           



                                                  EVERY DAY,           



                                                  # 30 tab,           



                                                  5              



                                                  Refill(s),           



                                                  Pharmacy:           



                                                  [place] [address]           

 

     amLODIPine            Yes                      See            [facility]



     5 mg oral      8-21                               Instructio           l



     tablet      18:50:                               ns, TAKE 1           [facility]

nn



               41                                 TABLET BY           



                                                  MOUTH           



                                                  EVERY DAY,           



                                                  # 30 tab,           



                                                  5              



                                                  Refill(s),           



                                                  Pharmacy:           



                                                  [place] [address]           

 

     amLODIPine            Yes                      See            [facility]



     5 mg oral      8-21                               Instructio           l



     tablet      18:50:                               ns, TAKE 1           [facility]

nn



               41                                 TABLET BY           



                                                  MOUTH           



                                                  EVERY DAY,           



                                                  # 30 tab,           



                                                  5              



                                                  Refill(s),           



                                                  Pharmacy:           



                                                  [place] [address]           

 

     amLODIPine            Yes                      See            [facility]



     5 mg oral      8-21                               Instructio           l



     tablet      18:50:                               ns, TAKE 1           [facility]

nn



               41                                 TABLET BY           



                                                  MOUTH           



                                                  EVERY DAY,           



                                                  # 30 tab,           



                                                  5              



                                                  Refill(s),           



                                                  Pharmacy:           



                                                  [place] [address]           

 

     amLODIPine            Yes                      See            [facility]



     5 mg oral      8-21                               Instructio           l



     tablet      18:50:                               ns, TAKE 1           [facility]

nn



               41                                 TABLET BY           



                                                  MOUTH           



                                                  EVERY DAY,           



                                                  # 30 tab,           



                                                  5              



                                                  Refill(s),           



                                                  Pharmacy:           



                                                  [place] [address]           

 

     amLODIPine            Yes                      See            [facility]



     5 mg oral      8-21                               Instructio           l



     tablet      18:50:                               ns, TAKE 1           [facility]

nn



               41                                 TABLET BY           



                                                  MOUTH           



                                                  EVERY DAY,           



                                                  # 30 tab,           



                                                  5              



                                                  Refill(s),           



                                                  Pharmacy:           



                                                  [facility] [place]           



                                                  [facility]           



                                                  [id number]           

 

     amLODIPine            Yes                      See            [facility]



     5 mg oral      8-21                               Instructio           l



     tablet      18:50:                               ns, TAKE 1           [facility]

nn



               41                                 TABLET BY           



                                                  MOUTH           



                                                  EVERY DAY,           



                                                  # 30 tab,           



                                                  5              



                                                  Refill(s),           



                                                  Pharmacy:           



                                                  [facility] [place]           



                                                  [facility]           



                                                  [id number]           

 

     amLODIPine            Yes                      See            [facility]



     5 mg oral      8-21                               Instruct[facility]           l



     tablet      18:50:                               ns, TAKE 1           [facility]

nn



               41                                 TABLET BY           



                                                  MOUTH           



                                                  EVERY DAY,           



                                                  # 30 tab,           



                                                  5              



                                                  Refill(s),           



                                                  Pharmacy:           



                                                  [facility] [place]           



                                                  [facility]           



                                                  [id number]           

 

     amLODIPine            Yes                      See            [facility]



     5 mg oral      8-21                               Instruct[facility]           l



     tablet      18:50:                               ns, TAKE 1           [facility]

nn



               41                                 TABLET BY           



                                                  MOUTH           



                                                  EVERY DAY,           



                                                  # 30 tab,           



                                                  5              



                                                  Refill(s),           



                                                  Pharmacy:           



                                                  [facility] [place]           



                                                  [facility]           



                                                  [id number]           

 

     amLODIPine            Yes                      See            [facility]



     5 mg oral      8-21                               Instructio           l



     tablet      18:50:                               ns, TAKE 1           [facility]

nn



               41                                 TABLET BY           



                                                  MOUTH           



                                                  EVERY DAY,           



                                                  # 30 tab,           



                                                  5              



                                                  Refill(s),           



                                                  Pharmacy:           



                                                  [facility] [place]           



                                                  [facility]           



                                                  [id number]           

 

     lisinopril            No                       See            [facility]



     5 mg oral      7-31                               Instruct[facility]           l



     tablet      15:28:                               ns, # 90           [place]



               34                                 tab, TAKE           



                                                  1 TABLET           



                                                  BY MOUTH           



                                                  DAILY,           



                                                  Pharmacy:           



                                                  [facility] [place]           



                                                  [facility]           



                                                  [id number]           

 

     lisinopril            No                       See            [facility]



     5 mg oral      7-31                               Instruct[facility]           l



     tablet      15:28:                               ns, # 90           [place]



               34                                 tab, TAKE           



                                                  1 TABLET           



                                                  BY MOUTH           



                                                  DAILY,           



                                                  Pharmacy:           



                                                  [facility] [place]           



                                                  [facility]           



                                                  [id number]           

 

     lisinopril            No                       See            [facility]



     5 mg oral      7-31                               In[facility]           l



     tablet      15:28:                               ns, # 90           [place]



               34                                 tab, TAKE           



                                                  1 TABLET           



                                                  BY MOUTH           



                                                  DAILY,           



                                                  Pharmacy:           



                                                  [facility] [place]           



                                                  [facility]           



                                                  [id number]           

 

     lisinopril      0      No                       See            [facility]



     5 mg oral      7-31                               In[facility]           l



     tablet      15:28:                               ns, # 90           [place]



               34                                 tab, TAKE           



                                                  1 TABLET           



                                                  BY MOUTH           



                                                  DAILY,           



                                                  Pharmacy:           



                                                  [facility] [place]           



                                                  [facility]           



                                                  [id number]           

 

     lisinopril      0      No                       See            [facility]



     5 mg oral      7-31                               In[facility]           l



     tablet      15:28:                               ns, # 90           [place]



               34                                 tab, TAKE           



                                                  1 TABLET           



                                                  BY MOUTH           



                                                  DAILY,           



                                                  Pharmacy:           



                                                  [facility] [place]           



                                                  [facility]           



                                                  [id number]           

 

     lisinopril      -0      No                       See            [facility]



     5 mg oral      7-31                               [facility]           l



     tablet      15:28:                               ns, # 90           [place]



               34                                 tab, TAKE           



                                                  1 TABLET           



                                                  BY MOUTH           



                                                  DAILY,           



                                                  Pharmacy:           



                                                  [facility]           



                                                  [facility]           



                                                  [id number]           

 

     lisinopril            No                       See            [facility]



     5 mg oral      7-31                               Instructio           l



     tablet      15:28:                               ns, # 90           [place]



               34                                 tab, TAKE           



                                                  1 TABLET           



                                                  BY MOUTH           



                                                  DAILY,           



                                                  Pharmacy:           



                                                  [facility]           



                                                  [facility]           



                                                  [id number]           

 

     lisinopril            No                       See            [facility]



     5 mg oral      7-31                               Instructio           l



     tablet      15:28:                               ns, # 90           [place]



               34                                 tab, TAKE           



                                                  1 TABLET           



                                                  BY MOUTH           



                                                  DAILY,           



                                                  Pharmacy:           



                                                  [facility]           



                                                  [facility]           



                                                  [id number]           

 

     lisinopril            No                       See            [facility]



     5 mg oral      7-31                               Instructio           l



     tablet      15:28:                               ns, # 90           [place]



               34                                 tab, TAKE           



                                                  1 TABLET           



                                                  BY MOUTH           



                                                  DAILY,           



                                                  Pharmacy:           



                                                  [facility]           



                                                  [facility]           



                                                  [id number]           

 

     lisinopril            No                       See            [facility]



     5 mg oral      7-31                               Instructio           l



     tablet      15:28:                               ns, # 90           [place]



               34                                 tab, TAKE           



                                                  1 TABLET           



                                                  BY MOUTH           



                                                  DAILY,           



                                                  Pharmacy:           



                                                  [facility]           



                                                  [facility]           



                                                  [id number]           

 

     allopurinol      0      No                       300 mg = 1           

[facility]



     300 mg oral      5-10                               tab, PO,           l



     tablet      13:59:                               Daily, #           [place]



               00                                 90 tab, 1           



                                                  Refill(s),           



                                                  Pharmacy:           



                                                  [facility]           



                                                  [facility]           



                                                  [id number]           

 

     allopurinol      -0      No                       300 mg = 1           

[facility]



     300 mg oral      5-10                               tab, PO,           l



     tablet      13:59:                               Daily, #           [place]



               00                                 90 tab, 1           



                                                  Refill(s),           



                                                  Pharmacy:           



                                                  [facility]           



                                                  [facility]           



                                                  [id number]           

 

     allopurinol      -0      No                       300 mg = 1           

[facility]



     300 mg oral      5-10                               tab, PO,           l



     tablet      13:59:                               Daily, #           [place]



               00                                 90 tab, 1           



                                                  Refill(s),           



                                                  Pharmacy:           



                                                  [facility]           



                                                  [facility]           



                                                  [id number]           

 

     allopurinol      -0      No                       300 mg = 1           

[facility]



     300 mg oral      5-10                               tab, PO,           l



     tablet      13:59:                               Daily, #           [place]



               00                                 90 tab, 1           



                                                  Refill(s),           



                                                  Pharmacy:           



                                                  [facility]           



                                                  [facility]           



                                                  [id number]           

 

     allopurinol      2018-0      No                       300 mg = 1           

[facility]



     300 mg oral      5-10                               tab, PO,           l



     tablet      13:59:                               Daily, #           [place]



               00                                 90 tab, 1           



                                                  Refill(s),           



                                                  Pharmacy:           



                                                  [place] [id number]           

 

     allopurinol            No                       300 mg = 1           

[facility]



     300 mg oral      5-10                               tab, PO,           l



     tablet      13:59:                               Daily, #           [place]



               00                                 90 tab, 1           



                                                  Refill(s),           



                                                  Pharmacy:           



                                                  [facility]           



                                                  [facility]           



                                                  [id number]           

 

     allopurinol            No                       300 mg = 1           

[facility]



     300 mg oral      5-10                               tab, PO,           l



     tablet      13:59:                               Daily, #           [place]



               00                                 90 tab, 1           



                                                  Refill(s),           



                                                  Pharmacy:           



                                                  [facility]           



                                                  [facility]           



                                                  [id number]           

 

     allopurinol            No                       300 mg = 1           

[facility]



     300 mg oral      5-10                               tab, PO,           l



     tablet      13:59:                               Daily, #           [place]



               00                                 90 tab, 1           



                                                  Refill(s),           



                                                  Pharmacy:           



                                                  [facility]           



                                                  [facility]           



                                                  [id number]           

 

     allopurinol            No                       300 mg = 1           

[facility]



     300 mg oral      5-10                               tab, PO,           l



     tablet      13:59:                               Daily, #           [place]



               00                                 90 tab, 1           



                                                  Refill(s),           



                                                  Pharmacy:           



                                                  [facility]           



                                                  [facility]           



                                                  [id number]           

 

     allopurinol            No                       300 mg = 1           

[facility]



     300 mg oral      5-10                               tab, PO,           l



     tablet      13:59:                               Daily, #           [place]



               00                                 90 tab, 1           



                                                  Refill(s),           



                                                  Pharmacy:           



                                                  [facility]           



                                                  [facility]           



                                                  [id number]           

 

     lisinopril            No                       See            [facility]



     5 mg oral      5-01                               Instruct[facility]           l



     tablet      12:38:                               ns, # 90           [place]



               18                                 tab, TAKE           



                                                  1 TABLET           



                                                  BY MOUTH           



                                                  DAILY,           



                                                  Pharmacy:           



                                                  [facility]           



                                                  [facility]           



                                                  [id number]           

 

     ALLOPURINOL            Yes                      See            [facility]

a



     300MG      5-01                               [facility]           l



     TABLETS      12:38:                               ns, # 90           [place]

n



               18                                 tab, TAKE           



                                                  1 TABLET           



                                                  BY MOUTH           



                                                  DAILY,           



                                                  Pharmacy:           



                                                  [facility]           



                                                  [facility]           



                                                  [id number]           

 

     lisinopril            No                       See            [facility]



     5 mg oral      5-01                               Instructio           l



     tablet      12:38:                               ns, # 90           [place]



               18                                 tab, TAKE           



                                                  1 TABLET           



                                                  BY MOUTH           



                                                  DAILY,           



                                                  Pharmacy:           



                                                  [facility]           



                                                  [facility]           



                                                  [id number]           

 

     ALLOPURINOL            Yes                      See            [facility]

a



     300MG      5-01                               In[facility]           l



     TABLETS      12:38:                               ns, # 90           [place]

n



               18                                 tab, TAKE           



                                                  1 TABLET           



                                                  BY MOUTH           



                                                  DAILY,           



                                                  Pharmacy:           



                                                  [facility]           



                                                  [facility]           



                                                  [id number]           

 

     lisinopril      0      No                       See            [facility]



     5 mg oral      5-01                               Instructio           l



     tablet      12:38:                               ns, # 90           [place]



               18                                 tab, TAKE           



                                                  1 TABLET           



                                                  BY MOUTH           



                                                  DAILY,           



                                                  Pharmacy:           



                                                  [facility]           



                                                  [facility]           



                                                  [id number]           

 

     ALLOPURINOL      0      Yes                      See            [facility]

a



     300MG      5-01                               Instructio           l



     TABLETS      12:38:                               ns, # 90           [place]

n



               18                                 tab, TAKE           



                                                  1 TABLET           



                                                  BY MOUTH           



                                                  DAILY,           



                                                  Pharmacy:           



                                                  [facility]           



                                                  [facility]           



                                                  [id number]           

 

     lisinopril      0      No                       See            [facility]



     5 mg oral      5-01                               Instructio           l



     tablet      12:38:                               ns, # 90           [place]



               18                                 tab, TAKE           



                                                  1 TABLET           



                                                  BY MOUTH           



                                                  DAILY,           



                                                  Pharmacy:           



                                                  [facility]           



                                                  [facility]           



                                                  [id number]           

 

     ALLOPURINOL      0      Yes                      See            [facility]

a



     300MG      5-01                               Instructio           l



     TABLETS      12:38:                               ns, # 90           [place]

n



               18                                 tab, TAKE           



                                                  1 TABLET           



                                                  BY MOUTH           



                                                  DAILY,           



                                                  Pharmacy:           



                                                  [facility]           



                                                  [facility]           



                                                  [id number]           

 

     lisinopril      0      No                       See            [facility]



     5 mg oral      5-01                               Instructio           l



     tablet      12:38:                               ns, # 90           [place]



               18                                 tab, TAKE           



                                                  1 TABLET           



                                                  BY MOUTH           



                                                  DAILY,           



                                                  Pharmacy:           



                                                  [facility]           



                                                  [facility]           



                                                  [id number]           

 

     ALLOPURINOL      0      Yes                      See            [facility]

a



     300MG      5-01                               Instructio           l



     TABLETS      12:38:                               ns, # 90           [place]

n



               18                                 tab, TAKE           



                                                  1 TABLET           



                                                  BY MOUTH           



                                                  DAILY,           



                                                  Pharmacy:           



                                                  [facility]           



                                                  [facility]           



                                                  [id number]           

 

     lisinopril      0      No                       See            [facility]



     5 mg oral      5-01                               Instructio           l



     tablet      12:38:                               ns, # 90           [place]



               18                                 tab, TAKE           



                                                  1 TABLET           



                                                  BY MOUTH           



                                                  DAILY,           



                                                  Pharmacy:           



                                                  [facility]           



                                                  [facility]           



                                                  [id number]           

 

     ALLOPURINOL      2018-0      Yes                      See            [facility]

a



     300MG      5-01                               Instructio           l



     TABLETS      12:38:                               ns, # 90           [place]

n



               18                                 tab, TAKE           



                                                  1 TABLET           



                                                  BY MOUTH           



                                                  DAILY,           



                                                  Pharmacy:           



                                                  [facility]           



                                                  [facility]           



                                                  [id number]           

 

     lisinopril      0      No                       See            [facility]



     5 mg oral      5-01                               Instructio           l



     tablet      12:38:                               ns, # 90           [place]



               18                                 tab, TAKE           



                                                  1 TABLET           



                                                  BY MOUTH           



                                                  DAILY,           



                                                  Pharmacy:           



                                                  [facility]           



                                                  [facility]           



                                                  [id number]           

 

     ALLOPURINOL      2018-0      Yes                      See            [facility]

a



     300MG      5-01                               Instructio           l



     TABLETS      12:38:                               ns, # 90           [place]

n



               18                                 tab, TAKE           



                                                  1 TABLET           



                                                  BY MOUTH           



                                                  DAILY,           



                                                  Pharmacy:           



                                                  [facility]           



                                                  [facility]           



                                                  [id number]           

 

     lisinopril            No                       See            [facility]



     5 mg oral      5-01                               Instructio           l



     tablet      12:38:                               ns, # 90           [place]



               18                                 tab, TAKE           



                                                  1 TABLET           



                                                  BY MOUTH           



                                                  DAILY,           



                                                  Pharmacy:           



                                                  [facility]           



                                                  [facility]           



                                                  [id number]           

 

     ALLOPURINOL            Yes                      See            [facility]

a



     300MG      5-01                               [facility]           l



     TABLETS      12:38:                               ns, # 90           [place]

n



               18                                 tab, TAKE           



                                                  1 TABLET           



                                                  BY MOUTH           



                                                  DAILY,           



                                                  Pharmacy:           



                                                  [facility]           



                                                  [facility]           



                                                  [id number]           

 

     lisinopril            No                       See            [facility]



     5 mg oral      5-01                               [facility]           l



     tablet      12:38:                               ns, # 90           [place]



               18                                 tab, TAKE           



                                                  1 TABLET           



                                                  BY MOUTH           



                                                  DAILY,           



                                                  Pharmacy:           



                                                  [facility]           



                                                  [facility]           



                                                  [id number]           

 

     ALLOPURINOL            Yes                      See            [facility]

a



     300MG      5-01                               [facility]           l



     TABLETS      12:38:                               ns, # 90           [place]

n



               18                                 tab, TAKE           



                                                  1 TABLET           



                                                  BY MOUTH           



                                                  DAILY,           



                                                  Pharmacy:           



                                                  [facility]           



                                                  [facility]           



                                                  [id number]           

 

     lisinopril            No                       See            [facility]



     5 mg oral      5-01                               [facility]           l



     tablet      12:38:                               ns, # 90           [place]



               18                                 tab, TAKE           



                                                  1 TABLET           



                                                  BY MOUTH           



                                                  DAILY,           



                                                  Pharmacy:           



                                                  [facility]           



                                                  [facility]           



                                                  [id number]           

 

     ALLOPURINOL            Yes                      See            [facility]

a



     300MG      5-01                               [facility]           l



     TABLETS      12:38:                               ns, # 90           [place]

n



               18                                 tab, TAKE           



                                                  1 TABLET           



                                                  BY MOUTH           



                                                  DAILY,           



                                                  Pharmacy:           



                                                  [facility]           



                                                  [facility]           



                                                  [id number]           

 

     calcitriol            Yes                      0.25           [facility]



     0.25 mcg      3-28                               microgram           l



     oral      13:49:                               = 1 cap,           [place]



     capsule      00                                 PO, Daily,           



                                                  # 90 cap,           



                                                  3              



                                                  Refill(s),           



                                                  Pharmacy:           



                                                  [facility]           



                                                  [facility]           



                                                  [id number]           

 

     calcitriol            Yes                      0.25           [facility]



     0.25 mcg      3-28                               microgram           l



     oral      13:49:                               = 1 cap,           [place]



     capsule      00                                 PO, Daily,           



                                                  # 90 cap,           



                                                  3              



                                                  Refill(s),           



                                                  Pharmacy:           



                                                  [facility]           



                                                  [facility]           



                                                  [id number]           

 

     calcitriol            Yes                      0.25           [facility]



     0.25 mcg      3-28                               microgram           l



     oral      13:49:                               = 1 cap,           [place]



     capsule      00                                 PO, Daily,           



                                                  # 90 cap,           



                                                  3              



                                                  Refill(s),           



                                                  Pharmacy:           



                                                  [facility]           



                                                  [facility]           



                                                  [id number]           

 

     calcitriol            Yes                      0.25           [facility]



     0.25 mcg      3-28                               microgram           l



     oral      13:49:                               = 1 cap,           [place]



     capsule      00                                 PO, Daily,           



                                                  # 90 cap,           



                                                  3              



                                                  Refill(s),           



                                                  Pharmacy:           



                                                  [facility]           



                                                  [facility]           



                                                  [id number]           

 

     calcitriol            Yes                      0.25           [facility]



     0.25 mcg      3-28                               microgram           l



     oral      13:49:                               = 1 cap,           [place]



     capsule      00                                 PO, Daily,           



                                                  # 90 cap,           



                                                  3              



                                                  Refill(s),           



                                                  Pharmacy:           



                                                  [place] [id number]           

 

     calcitriol            Yes                      0.25           [facility]



     0.25 mcg      3-28                               microgram           l



     oral      13:49:                               = 1 cap,           [place]



     capsule      00                                 PO, Daily,           



                                                  # 90 cap,           



                                                  3              



                                                  Refill(s),           



                                                  Pharmacy:           



                                                  [facility]           



                                                  [facility]           



                                                  [id number]           

 

     calcitriol            Yes                      0.25           [facility]



     0.25 mcg      3-28                               microgram           l



     oral      13:49:                               = 1 cap,           [place]



     capsule      00                                 PO, Daily,           



                                                  # 90 cap,           



                                                  3              



                                                  Refill(s),           



                                                  Pharmacy:           



                                                  [facility]           



                                                  [facility]           



                                                  [id number]           

 

     calcitriol            Yes                      0.25           [facility]



     0.25 mcg      3-28                               microgram           l



     oral      13:49:                               = 1 cap,           [place]



     capsule      00                                 PO, Daily,           



                                                  # 90 cap,           



                                                  3              



                                                  Refill(s),           



                                                  Pharmacy:           



                                                  [facility]           



                                                  [facility]           



                                                  [id number]           

 

     calcitriol            Yes                      0.25           [facility]



     0.25 mcg      3-28                               microgram           l



     oral      13:49:                               = 1 cap,           [place]



     capsule      00                                 PO, Daily,           



                                                  # 90 cap,           



                                                  3              



                                                  Refill(s),           



                                                  Pharmacy:           



                                                  [facility]           



                                                  [facility]           



                                                  [id number]           

 

     calcitriol            Yes                      0.25           [facility]



     0.25 mcg      3-28                               microgram           l



     oral      13:49:                               = 1 cap,           [place]



     capsule      00                                 PO, Daily,           



                                                  # 90 cap,           



                                                  3              



                                                  Refill(s),           



                                                  Pharmacy:           



                                                  [facility]           



                                                  [facility]           



                                                  [id number]           

 

     Mupirocin            Yes                      1 appl,           [facility]

ia



     0.02 MG/MG      3-21                               TOP, BID,           l



     Topical      15:37:                               30 grams           [place]

n



     Ointment      21                                 3each, # 3           



                                                  ea, 1           



                                                  Refill(s),           



                                                  Pharmacy:           



                                                  [facility]           



                                                  [facility]           



                                                  [id number]           

 

     Mupirocin      2018-0      Yes                      1 appl,           [facility]

ia



     0.02 MG/MG      3-21                               TOP, BID,           l



     Topical      15:37:                               30 grams           [place]

n



     Ointment      21                                 3each, # 3           



                                                  ea, 1           



                                                  Refill(s),           



                                                  Pharmacy:           



                                                  [facility]           



                                                  [facility] [address]      -      Yes                      1 appl,           [facility]

ia



     0.02 MG/MG      3-21                               TOP, BID,           l



     Topical      15:37:                               30 grams           [place]

n



     Ointment      21                                 3each, # 3           



                                                  ea, 1           



                                                  Refill(s),           



                                                  Pharmacy:           



                                                  [facility]           



                                                  [facility] [address]            Yes                      1 appl,           [facility]

ia



     0.02 MG/MG      3-21                               TOP, BID,           l



     Topical      15:37:                               30 grams           [place]

n



     Ointment      21                                 3each, # 3           



                                                  ea, 1           



                                                  Refill(s),           



                                                  Pharmacy:           



                                                  [facility]           



                                                  [facility] [address]            Yes                      1 appl,           [facility]

ia



     0.02 MG/MG      3-21                               TOP, BID,           l



     Topical      15:37:                               30 grams           [place]

n



     Ointment      21                                 3each, # 3           



                                                  ea, 1           



                                                  Refill(s),           



                                                  Pharmacy:           



                                                  [facility]           



                                                  [facility] [address]            Yes                      1 appl,           [facility]

ia



     0.02 MG/MG      3-21                               TOP, BID,           l



     Topical      15:37:                               30 grams           [place]

n



     Ointment      21                                 3each, # 3           



                                                  ea, 1           



                                                  Refill(s),           



                                                  Pharmacy:           



                                                  [facility]           



                                                  [facility] [address]            Yes                      1 appl,           [facility]

[facility]



     0.02 MG/MG      3-21                               TOP, BID,           l



     Topical      15:37:                               30 grams           [place]

n



     Ointment      21                                 3each, # 3           



                                                  ea, 1           



                                                  Refill(s),           



                                                  Pharmacy:           



                                                  [facility]           



                                                  [facility] [address]      -0      Yes                      1 appl,           [facility]

ia



     0.02 MG/MG      3-21                               TOP, BID,           l



     Topical      15:37:                               30 grams           [place]

n



     Ointment      21                                 3each, # 3           



                                                  ea, 1           



                                                  Refill(s),           



                                                  Pharmacy:           



                                                  [facility]           



                                                  [facility] [address]            Yes                      1 appl,           [facility]

ia



     0.02 MG/MG      3-21                               TOP, BID,           l



     Topical      15:37:                               30 grams           [place]

n



     Ointment      21                                 3each, # 3           



                                                  ea, 1           



                                                  Refill(s),           



                                                  Pharmacy:           



                                                  [facility]           



                                                  [facility]           



                                                  [id number]           

 

     Mupirocin            Yes                      1 appl,           [facility]

ia



     0.02 MG/MG      3-21                               TOP, BID,           l



     Topical      15:37:                               30 grams           [place]

n



     Ointment      21                                 3each, # 3           



                                                  ea, 1           



                                                  Refill(s),           



                                                  Pharmacy:           



                                                  [facility]           



                                                  [facility] [address]            Yes                      See            [facility]

a



     n 40 mg      3-21                               [facility]           l



     oral tablet      15:36:                               ns, TAKE 1           

[facility]



               22                                 TABLET BY           



                                                  MOUTH           



                                                  EVERY           



                                                  NIGHT AT           



                                                  BEDTIME, #           



                                                  30 tab, 5           



                                                  Refill(s),           



                                                  Pharmacy:           



                                                  [facility]           



                                                  [facility] [address]            Yes                      See            [facility]

a



     n 40 mg      3-21                               [facility]           l



     oral tablet      15:36:                               ns, TAKE 1           

[place]



               22                                 TABLET BY           



                                                  MOUTH           



                                                  EVERY           



                                                  NIGHT AT           



                                                  BEDTIME, #           



                                                  30 tab, 5           



                                                  Refill(s),           



                                                  Pharmacy:           



                                                  [facility]           



                                                  [facility] [address]            Yes                      See            [facility]

a



     n 40 mg      3-21                               [facility]           l



     oral tablet      15:36:                               ns, TAKE 1           

[place]



               22                                 TABLET BY           



                                                  MOUTH           



                                                  EVERY           



                                                  NIGHT AT           



                                                  BEDTIME, #           



                                                  30 tab, 5           



                                                  Refill(s),           



                                                  Pharmacy:           



                                                  [facility]           



                                                  [facility] [address]            Yes                      See            [facility]

a



     n 40 mg      3-21                               [facility]           l



     oral tablet      15:36:                               ns, TAKE 1           

[place]



               22                                 TABLET BY           



                                                  MOUTH           



                                                  EVERY           



                                                  NIGHT AT           



                                                  BEDTIME, #           



                                                  30 tab, 5           



                                                  Refill(s),           



                                                  Pharmacy:           



                                                  [facility]           



                                                  [facility] [address]            Yes                      See            [facility]

a



     n 40 mg      3-21                               [facility]           l



     oral tablet      15:36:                               ns, TAKE 1           

[place]



               22                                 TABLET BY           



                                                  MOUTH           



                                                  EVERY           



                                                  NIGHT AT           



                                                  BEDTIME, #           



                                                  30 tab, 5           



                                                  Refill(s),           



                                                  Pharmacy:           



                                                  [facility]           



                                                  [address]            Yes                      See            [facility]

a



     n 40 mg      3-21                               Instructio           l



     oral tablet      15:36:                               ns, TAKE 1           

[place]



               22                                 TABLET BY           



                                                  MOUTH           



                                                  EVERY           



                                                  NIGHT AT           



                                                  BEDTIME, #           



                                                  30 tab, 5           



                                                  Refill(s),           



                                                  Pharmacy:           



                                                  [facility]           



                                                  [address]            Yes                      See            [facility]

a



     n 40 mg      3-21                               Instructio           l



     oral tablet      15:36:                               ns, TAKE 1           

[place]



               22                                 TABLET BY           



                                                  MOUTH           



                                                  EVERY           



                                                  NIGHT AT           



                                                  BEDTIME, #           



                                                  30 tab, 5           



                                                  Refill(s),           



                                                  Pharmacy:           



                                                  [place] [address]           

 

     atorvastati            Yes                      See            [facility]

a



     n 40 mg      3-21                               Instructio           l



     oral tablet      15:36:                               ns, TAKE 1           

[place]



               22                                 TABLET BY           



                                                  MOUTH           



                                                  EVERY           



                                                  NIGHT AT           



                                                  BEDTIME, #           



                                                  30 tab, 5           



                                                  Refill(s),           



                                                  Pharmacy:           



                                                  [place] [id number]           

 

     atorvastati            Yes                      See            [facility]

a



     n 40 mg      3-21                               Instructio           l



     oral tablet      15:36:                               ns, TAKE 1           

[place]



               22                                 TABLET BY           



                                                  MOUTH           



                                                  EVERY           



                                                  NIGHT AT           



                                                  BEDTIME, #           



                                                  30 tab, 5           



                                                  Refill(s),           



                                                  Pharmacy:           



                                                  [place] [address]           

 

     atorvastati            Yes                      See            [facility]

a



     n 40 mg      3-21                               Instructio           l



     oral tablet      15:36:                               ns, TAKE 1           

[place]



               22                                 TABLET BY           



                                                  MOUTH           



                                                  EVERY           



                                                  NIGHT AT           



                                                  BEDTIME, #           



                                                  30 tab, 5           



                                                  Refill(s),           



                                                  Pharmacy:           



                                                  [place] [address]           

 

     amLODIPine            Yes                      See            [facility]



     5 mg oral      3-21                               Instructio           l



     tablet      15:36:                               ns, TAKE 1           [place]

nn



               18                                 TABLET BY           



                                                  MOUTH           



                                                  EVERY DAY,           



                                                  # 30 tab,           



                                                  5              



                                                  Refill(s),           



                                                  Pharmacy:           



                                                  [place] [address]           

 

     amLODIPine            Yes                      See            [facility]



     5 mg oral      3-21                               Instructio           l



     tablet      15:36:                               ns, TAKE 1           [place]

nn



               18                                 TABLET BY           



                                                  MOUTH           



                                                  EVERY DAY,           



                                                  # 30 tab,           



                                                  5              



                                                  Refill(s),           



                                                  Pharmacy:           



                                                  [facility]           



                                                  [address]           



                                                  [id number]           

 

     amLODIPine            Yes                      See            [facility]



     5 mg oral      3-21                               Instructio           l



     tablet      15:36:                               ns, TAKE 1           [place]

nn



               18                                 TABLET BY           



                                                  MOUTH           



                                                  EVERY DAY,           



                                                  # 30 tab,           



                                                  5              



                                                  Refill(s),           



                                                  Pharmacy:           



                                                  [place] [address]           

 

     amLODIPine            Yes                      See            [facility]



     5 mg oral      3-21                               Instructio           l



     tablet      15:36:                               ns, TAKE 1           [place]

nn



               18                                 TABLET BY           



                                                  MOUTH           



                                                  EVERY DAY,           



                                                  # 30 tab,           



                                                  5              



                                                  Refill(s),           



                                                  Pharmacy:           



                                                  [place] [address]           

 

     amLODIPine            Yes                      See            [facility]



     5 mg oral      3-21                               Instructio           l



     tablet      15:36:                               ns, TAKE 1           [place]

nn



               18                                 TABLET BY           



                                                  MOUTH           



                                                  EVERY DAY,           



                                                  # 30 tab,           



                                                  5              



                                                  Refill(s),           



                                                  Pharmacy:           



                                                  [place] [address]           

 

     amLODIPine            Yes                      See            [facility]



     5 mg oral      3-21                               Instructio           l



     tablet      15:36:                               ns, TAKE 1           [place]

nn



               18                                 TABLET BY           



                                                  MOUTH           



                                                  EVERY DAY,           



                                                  # 30 tab,           



                                                  5              



                                                  Refill(s),           



                                                  Pharmacy:           



                                                  [place] [address]           

 

     amLODIPine            Yes                      See            [facility]



     5 mg oral      3-21                               Instructio           l



     tablet      15:36:                               ns, TAKE 1           [place]

nn



               18                                 TABLET BY           



                                                  MOUTH           



                                                  EVERY DAY,           



                                                  # 30 tab,           



                                                  5              



                                                  Refill(s),           



                                                  Pharmacy:           



                                                  [place] [address]           

 

     amLODIPine            Yes                      See            [facility]



     5 mg oral      3-21                               Instructio           l



     tablet      15:36:                               ns, TAKE 1           [place]

nn



               18                                 TABLET BY           



                                                  MOUTH           



                                                  EVERY DAY,           



                                                  # 30 tab,           



                                                  5              



                                                  Refill(s),           



                                                  Pharmacy:           



                                                  [place] [address]           

 

     amLODIPine            Yes                      See            [facility]



     5 mg oral      3-21                               Instructio           l



     tablet      15:36:                               ns, TAKE 1           [facility]

nn



               18                                 TABLET BY           



                                                  MOUTH           



                                                  EVERY DAY,           



                                                  # 30 tab,           



                                                  5              



                                                  Refill(s),           



                                                  Pharmacy:           



                                                  [place] [address]           

 

     amLODIPine            Yes                      See            [facility]



     5 mg oral      3-21                               Instructio           l



     tablet      15:36:                               ns, TAKE 1           [place]

nn



               18                                 TABLET BY           



                                                  MOUTH           



                                                  EVERY DAY,           



                                                  # 30 tab,           



                                                  5              



                                                  Refill(s),           



                                                  Pharmacy:           



                                                  [place] [address]           

 

     aspirin 81      -      Yes                      81 mg = 1           Me

[facility]



     mg tablet,      1-24                               tab, PO,           l



     enteric      16:34:                               Daily, #           [facility]

n



     coated      00                                 90 tab, 3           



                                                  Refill(s)           

 

     aspirin 81            Yes                      81 mg = 1           Me

[facility]



     mg tablet,      1-24                               tab, PO,           l



     enteric      16:34:                               Daily, #           [facility]

n



     coated      00                                 90 tab, 3           



                                                  Refill(s)           

 

     aspirin 81      2017-      Yes                      81 mg = 1           [facility]

[facility]



     mg tablet,      1-24                               tab, PO,           l



     enteric      16:34:                               Daily, #           [facility]

n



     coated      00                                 90 tab, 3           



                                                  Refill(s)           

 

     aspirin 81      2017-      Yes                      81 mg = 1           Me

[facility]



     mg tablet,      1-24                               tab, PO,           l



     enteric      16:34:                               Daily, #           [facility]

n



     coated      00                                 90 tab, 3           



                                                  Refill(s)           

 

     aspirin 81      -      Yes                      81 mg = 1           Me

[facility]



     mg tablet,      1-24                               tab, PO,           l



     enteric      16:34:                               Daily, #           [facility]

n



     coated      00                                 90 tab, 3           



                                                  Refill(s)           

 

     aspirin 81      2017      Yes                      81 mg = 1           Me

[facility]



     mg tablet,      1-24                               tab, PO,           l



     enteric      16:34:                               Daily, #           [facility]

n



     coated      00                                 90 tab, 3           



                                                  Refill(s)           

 

     aspirin 81      2017      Yes                      81 mg = 1           Me

[facility]



     mg tablet,      1-24                               tab, PO,           l



     enteric      16:34:                               Daily, #           [facility]

n



     coated      00                                 90 tab, 3           



                                                  Refill(s)           

 

     aspirin 81      2017      Yes                      81 mg = 1           Me

[facility]



     mg tablet,      1-24                               tab, PO,           l



     enteric      16:34:                               Daily, #           [facility]

n



     coated      00                                 90 tab, 3           



                                                  Refill(s)           

 

     aspirin 81      2017      Yes                      81 mg = 1           Me

[facility]



     mg tablet,      1-24                               tab, PO,           l



     enteric      16:34:                               Daily, #           [facility]

n



     coated      00                                 90 tab, 3           



                                                  Refill(s)           

 

     aspirin 81            Yes                      81 mg = 1           Me

[facility]



     mg tablet,      1-24                               tab, PO,           l



     enteric      16:34:                               Daily, #           [facility]

n



     coated      00                                 90 tab, 3           



                                                  Refill(s)           

 

     Xopenex            No   Ghassan K                0.63 mg =           [facility]

[facility]



     0.63 mg/3      3-11           [facility]                3 mL,           l



     mL        01:00:                               Soln, NEB,           [place]



     inhalation      00                                 Once,           



     solution                                         first dose           



                                                  03/10/16           



                                                  19:00:00           



                                                  CST, stop           



                                                  date           



                                                  03/10/16           



                                                  19:00:00           



                                                  CST            

 

     Xopenex      0      No   Ghassan K                0.63 mg =           [facility]

[facility]



     0.63 mg/3      3-11           [place]                3 mL,           l



     mL        01:00:                               Soln, NEB,           [place]



     inhalation      00                                 Once,           



     solution                                         first dose           



                                                  03/10/16           



                                                  19:00:00           



                                                  CST, stop           



                                                  date           



                                                  03/10/16           



                                                  19:00:00           



                                                  CST            

 

     Xopenex      0      No   Ghassan K                0.63 mg =           [facility]

[facility]



     0.63 mg/3      3-11           [place]                3 mL,           l



     mL        01:00:                               Soln, NEB,           [place]



     inhalation      00                                 Once,           



     solution                                         first dose           



                                                  03/10/16           



                                                  19:00:00           



                                                  CST, stop           



                                                  date           



                                                  03/10/16           



                                                  19:00:00           



                                                  CST            

 

     Xopenex      2016-0      No   Ghassan K                0.63 mg =           [facility]

[facility]



     0.63 mg/3      3-11           [facility]                3 mL,           l



     mL        01:00:                               Soln, NEB,           [place]



     inhalation      00                                 Once,           



     solution                                         first dose           



                                                  03/10/16           



                                                  19:00:00           



                                                  CST, stop           



                                                  date           



                                                  03/10/16           



                                                  19:00:00           



                                                  CST            

 

     Xopenex            No   Ghassan K                0.63 mg =           [facility]

[facility]



     0.63 mg/3      3-11           [facility]                3 mL,           l



     mL        01:00:                               Soln, NEB,           [place]



     inhalation      00                                 Once,           



     solution                                         first dose           



                                                  03/10/16           



                                                  19:00:00           



                                                  CST, stop           



                                                  date           



                                                  03/10/16           



                                                  19:00:00           



                                                  CST            

 

     Xopenex            No   Ghassan K                0.63 mg =           [facility]

[facility]



     0.63 mg/3      3-11           [facility]                3 mL,           l



     mL        01:00:                               Soln, NEB,           [place]



     inhalation      00                                 Once,           



     solution                                         first dose           



                                                  03/10/16           



                                                  19:00:00           



                                                  CST, stop           



                                                  date           



                                                  03/10/16           



                                                  19:00:00           



                                                  CST            

 

     Xopenex      0      No   Ghassan K                0.63 mg =           [facility]

[facility]



     0.63 mg/3      3-11           [facility]                3 mL,           l



     mL        01:00:                               Soln, NEB,           [place]



     inhalation      00                                 Once,           



     solution                                         first dose           



                                                  03/10/16           



                                                  19:00:00           



                                                  CST, stop           



                                                  date           



                                                  03/10/16           



                                                  19:00:00           



                                                  CST            

 

     Xopenex      0      No   Ghassan K                0.63 mg =           [facility]

[facility]



     0.63 mg/3      3-11           [place]                3 mL,           l



     mL        01:00:                               Soln, NEB,           [place]



     inhalation      00                                 Once,           



     solution                                         first dose           



                                                  03/10/16           



                                                  19:00:00           



                                                  CST, stop           



                                                  date           



                                                  03/10/16           



                                                  19:00:00           



                                                  CST            

 

     Xopenex      0      No   Ghassan K                0.63 mg =           [facility]

[facility]



     0.63 mg/3      3-11           Chun                3 mL,           l



     mL        01:00:                               Soln, NEB,           [place]



     inhalation      00                                 Once,           



     solution                                         first dose           



                                                  03/10/16           



                                                  19:00:00           



                                                  CST, stop           



                                                  date           



                                                  03/10/16           



                                                  19:00:00           



                                                  CST            

 

     Xopenex            No   Ghassan K                0.63 mg =           [facility]

[facility]



     0.63 mg/3      3-11           Chun                3 mL,           l



     mL        01:00:                               Soln, NEB,           [place]



     inhalation      00                                 Once,           



     solution                                         first dose           



                                                  03/10/16           



                                                  19:00:00           



                                                  CST, stop           



                                                  date           



                                                  03/10/16           



                                                  19:00:00           



                                                  CST            

 

     Misc      2016-0      No   Deuce                300 mL,           [facility]



     Medication      3-11           [place]                Soln-IV,           l



               00:03:                               IV, Once,           [place]



               00                                 first dose           



                                                  03/10/16           



                                                  18:03:00           



                                                  CST, stop           



                                                  date           



                                                  03/10/16           



                                                  18:03:00           



                                                  CST            

 

     Misc      2016-0      No   Deuce                300 mL,           [facility]



     Medication      3-11           [place]                Soln-IV,           l



               00:03:                               IV, Once,           [place]



               00                                 first dose           



                                                  03/10/16           



                                                  18:03:00           



                                                  CST, stop           



                                                  date           



                                                  03/10/16           



                                                  18:03:00           



                                                  CST            

 

     Misc      -0      No   Deuce                300 mL,           [facility]



     Medication      3-11           [place]                Soln-IV,           l



               00:03:                               IV, Once,           [place]



               00                                 first dose           



                                                  03/10/16           



                                                  18:03:00           



                                                  CST, stop           



                                                  date           



                                                  03/10/16           



                                                  18:03:00           



                                                  CST            

 

     Misc      2016-0      No   Deuce                300 mL,           [facility]



     Medication      3-11           [place]                Soln-IV,           l



               00:03:                               IV, Once,           [place]



               00                                 first dose           



                                                  03/10/16           



                                                  18:03:00           



                                                  CST, stop           



                                                  date           



                                                  03/10/16           



                                                  18:03:00           



                                                  CST            

 

     Misc      -0      No   Deuce                300 mL,           [facility]



     Medication      3-11           [place]                Soln-IV,           l



               00:03:                               IV, Once,           [place]



               00                                 first dose           



                                                  03/10/16           



                                                  18:03:00           



                                                  CST, stop           



                                                  date           



                                                  03/10/16           



                                                  18:03:00           



                                                  CST            

 

     Misc      2016-0      No   Deuce                300 mL,           [facility]



     Medication      3-11           [place]                Soln-IV,           l



               00:03:                               IV, Once,           [place]



               00                                 first dose           



                                                  03/10/16           



                                                  18:03:00           



                                                  CST, stop           



                                                  date           



                                                  03/10/16           



                                                  18:03:00           



                                                  CST            

 

     Misc      -0      No   Deuce                300 mL,           [facility]



     Medication      3-11           [place]                Soln-IV,           l



               00:03:                               IV, Once,           [place]



               00                                 first dose           



                                                  03/10/16           



                                                  18:03:00           



                                                  CST, stop           



                                                  date           



                                                  03/10/16           



                                                  18:03:00           



                                                  CST            

 

     Misc      2016-0      No   Deuce                300 mL,           [facility]



     Medication      3-11           [place]                Soln-IV,           l



               00:03:                               IV, Once,           [place]



               00                                 first dose           



                                                  03/10/16           



                                                  18:03:00           



                                                  CST, stop           



                                                  date           



                                                  03/10/16           



                                                  18:03:00           



                                                  CST            

 

     Misc            No   Deuce                300 mL,           [facility]



     Medication      3-11           [place]                Soln-IV,           l



               00:03:                               IV, Once,           [place]                                 first dose           



                                                  03/10/16           



                                                  18:03:00           



                                                  CST, stop           



                                                  date           



                                                  03/10/16           



                                                  18:03:00           



                                                  CST            

 

     Misc            No   Deuce                300 mL,           [facility]



     Medication      3-11           [place]                Soln-IV,           l



               00:03:                               IV, Once,           [place]                                 first dose           



                                                  03/10/16           



                                                  18:03:00           



                                                  CST, stop           



                                                  date           



                                                  03/10/16           



                                                  18:03:00           



                                                  CST            

 

     promethazin            No   Ghassan K                12.5 mg =          

 [facility]



     e         3-11           Chun                0.5 mL,           l



               00:02:                               Injection,           [place]



               00                                 IM, Once           



                                                  PRN for           



                                                  severe           



                                                  nausea,           



                                                  first dose           



                                                  03/10/16           



                                                  18:02:00           



                                                  CST            

 

     albuterol            No   Ghassan K                2.5 mg = 3           

[facility]



     2.5 mg/3 mL      3-11           Chun                mL, Soln,           

l



     (0.083%)      00:02:                               NEB, Once           [facility]

daryl



     inhalation      00                                 PRN for           



     solution                                         wheezing,           



                                                  first dose           



                                                  03/10/16           



                                                  18:02:00           



                                                  CST            

 

     Demerol HCl            No   Ghassan K                12.5 mg =          

 [facility]



               3-11           Chun                0.25 mL,           l



               00:02:                               Injection,           [place]



               00                                 IV Push,           



                                                  Once PRN           



                                                  for            



                                                  shivers,           



                                                  first dose           



                                                  03/10/16           



                                                  18:02:00           



                                                  CST            

 

     ondansetron            No   Ghassan K                4 mg = 2           

[facility]



               3-11           Chun                mL,            l



               00:02:                               Injection,           [place]



               00                                 IV Push,           



                                                  q15min PRN           



                                                  for            



                                                  nausea/vom           



                                                  iting,           



                                                  order           



                                                  duration:           



                                                  2 doses,           



                                                  first dose           



                                                  03/10/16           



                                                  18:02:00           



                                                  CST, stop           



                                                  date           



                                                  Limited #           



                                                  of times           

 

     Dilaudid            No   Ghassan K                0.5 mg =           [facility]

[facility]



               3-11           Chun                0.25 mL,           l



               00:02:                               Injection,           [place]



               00                                 IV Push,           



                                                  q10min PRN           



                                                  for pain           



                                                  severe           



                                                  (7-10),           



                                                  first dose           



                                                  03/10/16           



                                                  18:02:00           



                                                  CST            

 

     diphenhydrA            No   Ghassan K                25 mg =           M

[facility]



     MINE      3-11           Chun                0.5 mL,           l



               00:02:                               Injection,           [place]



               00                                 IV Push,           



                                                  Once PRN           



                                                  for            



                                                  itching,           



                                                  first dose           



                                                  03/10/16           



                                                  18:02:00           



                                                  CST            

 

     LR 1,000 mL            No   Ghassan K                1,000 mL,          

 [facility]



               3-11           Chun                IV, 75           l



               00:02:                               mL/hr,           [place]



               00                                 start date           



                                                  03/10/16           



                                                  18:02:00           



                                                  CST            

 

     Saline Lock            No   Ghassan K                10 mL,           [facility]

[facility]



     Flush      3-11           Chun                Soln, IV           l



               00:02:                               Push, As           [place]



               00                                 Indicated           



                                                  PRN for           



                                                  flush,           



                                                  first dose           



                                                  03/10/16           



                                                  18:02:00           



                                                  CST            

 

     Bupivacaine            No   Ghassan K                300 mL,           [facility]

[facility]



     0.25% 300      3-11           Chun                Nerve           l



     mL pump 300      00:02:                               Block, 5           [facility]

[facility]



     mL        00                                 mL/hr,           



                                                  start date           



                                                  03/10/16           



                                                  18:02:00           



                                                  CST            

 

     promethazin            No   Ghassan K                12.5 mg =          

 [facility]



     e         3-11           Chun                0.5 mL,           l



               00:02:                               Injection,           [place]



               00                                 IM, Once           



                                                  PRN for           



                                                  severe           



                                                  nausea,           



                                                  first dose           



                                                  03/10/16           



                                                  18:02:00           



                                                  CST            

 

     albuterol            No   Ghassan K                2.5 mg = 3           

[facility]



     2.5 mg/3 mL      3-11           Chun                mL, Soln,           

l



     (0.083%)      00:02:                               NEB, Once           [facility]

d[facility]



     inhalation      00                                 PRN for           



     solution                                         wheezing,           



                                                  first dose           



                                                  03/10/16           



                                                  18:02:00           



                                                  CST            

 

     Demerol HCl            No   Ghassan K                12.5 mg =          

 [facility]



               3-11           Chun                0.25 mL,           l



               00:02:                               Injection,           [place]



               00                                 IV Push,           



                                                  Once PRN           



                                                  for            



                                                  shivers,           



                                                  first dose           



                                                  03/10/16           



                                                  18:02:00           



                                                  CST            

 

     ondansetron            No   Ghassan K                4 mg = 2           

[facility]



               3-11           Chun                mL,            l



               00:02:                               Injection,           [place]



               00                                 IV Push,           



                                                  q15min PRN           



                                                  for            



                                                  nausea/vom           



                                                  iting,           



                                                  order           



                                                  duration:           



                                                  2 doses,           



                                                  first dose           



                                                  03/10/16           



                                                  18:02:00           



                                                  CST, stop           



                                                  date           



                                                  Limited #           



                                                  of times           

 

     Dilaudid            No   Ghassan K                0.5 mg =           [facility]

[facility]



               3-11           Chun                0.25 mL,           l



               00:02:                               Injection,           [place]



               00                                 IV Push,           



                                                  q10min PRN           



                                                  for pain           



                                                  severe           



                                                  (7-10),           



                                                  first dose           



                                                  03/10/16           



                                                  18:02:00           



                                                  CST            

 

     diphenhydrA            No   Ghassan K                25 mg =           [facility]

[facility]



     MINE      3-11           Chun                0.5 mL,           l



               00:02:                               Injection,           [place]



               00                                 IV Push,           



                                                  Once PRN           



                                                  for            



                                                  itching,           



                                                  first dose           



                                                  03/10/16           



                                                  18:02:00           



                                                  CST            

 

     LR 1,000 mL            No   Ghassan K                1,000 mL,          

 [facility]



               3-11           Chun                IV, 75           l



               00:02:                               mL/hr,           [place]



               00                                 start date           



                                                  03/10/16           



                                                  18:02:00           



                                                  CST            

 

     Saline Lock            No   Ghassan K                10 mL,           Me

[facility]



     Flush      3-11           Chun                Soln, IV           l



               00:02:                               Push, As           [facility]



               00                                 Indicated           



                                                  PRN for           



                                                  flush,           



                                                  first dose           



                                                  03/10/16           



                                                  18:02:00           



                                                  CST            

 

     Bupivacaine            No   Ghassan K                300 mL,           M

[facility]



     0.25% 300      3-11           Chun                Nerve           l



     mL pump 300      00:02:                               Block, 5           He

[facility]



     mL        00                                 mL/hr,           



                                                  start date           



                                                  03/10/16           



                                                  18:02:00           



                                                  CST            

 

     promethazin            No   Ghassan K                12.5 mg =          

 [facility]



     e         3-11           Chun                0.5 mL,           l



               00:02:                               Injection,           [place]



               00                                 IM, Once           



                                                  PRN for           



                                                  severe           



                                                  nausea,           



                                                  first dose           



                                                  03/10/16           



                                                  18:02:00           



                                                  CST            

 

     albuterol            No   Ghassan K                2.5 mg = 3           

[facility]



     2.5 mg/3 mL      3-11           Chun                mL, Soln,           

l



     (0.083%)      00:02:                               NEB, Once           [facility]

daryl



     inhalation      00                                 PRN for           



     solution                                         wheezing,           



                                                  first dose           



                                                  03/10/16           



                                                  18:02:00           



                                                  CST            

 

     Demerol HCl            No   Ghassan K                12.5 mg =          

 [facility]



               3-11           Chun                0.25 mL,           l



               00:02:                               Injection,           [place]



               00                                 IV Push,           



                                                  Once PRN           



                                                  for            



                                                  shivers,           



                                                  first dose           



                                                  03/10/16           



                                                  18:02:00           



                                                  CST            

 

     ondansetron            No   Ghassan K                4 mg = 2           

[facility]



               3-11           Chun                mL,            l



               00:02:                               Injection,           [place]



               00                                 IV Push,           



                                                  q15min PRN           



                                                  for            



                                                  nausea/vom           



                                                  iting,           



                                                  order           



                                                  duration:           



                                                  2 doses,           



                                                  first dose           



                                                  03/10/16           



                                                  18:02:00           



                                                  CST, stop           



                                                  date           



                                                  Limited #           



                                                  of times           

 

     Dilaudid            No   Ghassan K                0.5 mg =           [facility]

[facility]



               3-11           Chun                0.25 mL,           l



               00:02:                               Injection,           [place]



               00                                 IV Push,           



                                                  q10min PRN           



                                                  for pain           



                                                  severe           



                                                  (7-10),           



                                                  first dose           



                                                  03/10/16           



                                                  18:02:00           



                                                  CST            

 

     diphenhydrA            No   Ghassan K                25 mg =           M

[facility]



     MINE      3-11           Chun                0.5 mL,           l



               00:02:                               Injection,           [place]



               00                                 IV Push,           



                                                  Once PRN           



                                                  for            



                                                  itching,           



                                                  first dose           



                                                  03/10/16           



                                                  18:02:00           



                                                  CST            

 

     LR 1,000 mL            No   Ghassan K                1,000 mL,          

 [facility]



               3-11           Chun                IV, 75           l



               00:02:                               mL/hr,           [place]



               00                                 start date           



                                                  03/10/16           



                                                  18:02:00           



                                                  CST            

 

     Saline Lock            No   Ghassan K                10 mL,           Me

[facility]



     Flush      3-11           Chun                Soln, IV           l



               00:02:                               Push, As           [facility]



               00                                 Indicated           



                                                  PRN for           



                                                  flush,           



                                                  first dose           



                                                  03/10/16           



                                                  18:02:00           



                                                  CST            

 

     Bupivacaine            No   Ghassan K                300 mL,           M

[facility]



     0.25% 300      3-11           Chun                Nerve           l



     mL pump 300      00:02:                               Block, 5           He

[facility]



     mL        00                                 mL/hr,           



                                                  start date           



                                                  03/10/16           



                                                  18:02:00           



                                                  CST            

 

     promethazin            No   Ghassan K                12.5 mg =          

 [facility]



     e         3-11           [facility]nu                0.5 mL,           l



               00:02:                               Injection,           [place]



               00                                 IM, Once           



                                                  PRN for           



                                                  severe           



                                                  nausea,           



                                                  first dose           



                                                  03/10/16           



                                                  18:02:00           



                                                  CST            

 

     albuterol            No   Ghassan K                2.5 mg = 3           

[facility]



     2.5 mg/3 mL      3-11           Chun                mL, Soln,           

l



     (0.083%)      00:02:                               NEB, Once           [facility]

daryl



     inhalation      00                                 PRN for           



     solution                                         wheezing,           



                                                  first dose           



                                                  03/10/16           



                                                  18:02:00           



                                                  CST            

 

     Demerol HCl            No   Ghassan K                12.5 mg =          

 [facility]



               3-11           Chun                0.25 mL,           l



               00:02:                               Injection,           [place]



               00                                 IV Push,           



                                                  Once PRN           



                                                  for            



                                                  shivers,           



                                                  first dose           



                                                  03/10/16           



                                                  18:02:00           



                                                  CST            

 

     ondansetron            No   Ghassan K                4 mg = 2           

[facility]



               3-11           Chun                mL,            l



               00:02:                               Injection,           [place]



               00                                 IV Push,           



                                                  q15min PRN           



                                                  for            



                                                  nausea/vom           



                                                  iting,           



                                                  order           



                                                  duration:           



                                                  2 doses,           



                                                  first dose           



                                                  03/10/16           



                                                  18:02:00           



                                                  CST, stop           



                                                  date           



                                                  Limited #           



                                                  of times           

 

     Dilaudid            No   Ghassan K                0.5 mg =           [facility]

[facility]



               3-11           Chun                0.25 mL,           l



               00:02:                               Injection,           [place]



               00                                 IV Push,           



                                                  q10min PRN           



                                                  for pain           



                                                  severe           



                                                  (7-10),           



                                                  first dose           



                                                  03/10/16           



                                                  18:02:00           



                                                  CST            

 

     diphenhydrA            No   Ghassan K                25 mg =           M

[facility]



     MINE      3-11           Chun                0.5 mL,           l



               00:02:                               Injection,           [place]



               00                                 IV Push,           



                                                  Once PRN           



                                                  for            



                                                  itching,           



                                                  first dose           



                                                  03/10/16           



                                                  18:02:00           



                                                  CST            

 

     LR 1,000 mL            No   Ghassan K                1,000 mL,          

 [facility]



               3-11           Chun                IV, 75           l



               00:02:                               mL/hr,           [place]



               00                                 start date           



                                                  03/10/16           



                                                  18:02:00           



                                                  CST            

 

     Saline Lock            No   Ghassan K                10 mL,           Me

[facility]



     Flush      3-11           Chun                Soln, IV           l



               00:02:                               Push, As           [place]



               00                                 Indicated           



                                                  PRN for           



                                                  flush,           



                                                  first dose           



                                                  03/10/16           



                                                  18:02:00           



                                                  CST            

 

     Bupivacaine            No   Ghassan K                300 mL,           M

[facility]



     0.25% 300      3-11           Chun                Nerve           l



     mL pump 300      00:02:                               Block, 5           He

[facility]



     mL        00                                 mL/hr,           



                                                  start date           



                                                  03/10/16           



                                                  18:02:00           



                                                  CST            

 

     promethazin            No   Ghassan K                12.5 mg =          

 [facility]



     e         3-11           Chun                0.5 mL,           l



               00:02:                               Injection,           [place]



               00                                 IM, Once           



                                                  PRN for           



                                                  severe           



                                                  nausea,           



                                                  first dose           



                                                  03/10/16           



                                                  18:02:00           



                                                  CST            

 

     albuterol            No   Ghassan K                2.5 mg = 3           

[facility]



     2.5 mg/3 mL      3-11           Chun                mL, Soln,           

l



     (0.083%)      00:02:                               NEB, Once           [facility]

d[facility]



     inhalation      00                                 PRN for           



     solution                                         wheezing,           



                                                  first dose           



                                                  03/10/16           



                                                  18:02:00           



                                                  CST            

 

     Demerol HCl            No   Ghassan K                12.5 mg =          

 [facility]



               3-11           Chun                0.25 mL,           l



               00:02:                               Injection,           [place]



               00                                 IV Push,           



                                                  Once PRN           



                                                  for            



                                                  shivers,           



                                                  first dose           



                                                  03/10/16           



                                                  18:02:00           



                                                  CST            

 

     ondansetron            No   Ghassan K                4 mg = 2           

[facility]



               3-11           Chun                mL,            l



               00:02:                               Injection,           [place]



               00                                 IV Push,           



                                                  q15min PRN           



                                                  for            



                                                  nausea/vom           



                                                  iting,           



                                                  order           



                                                  duration:           



                                                  2 doses,           



                                                  first dose           



                                                  03/10/16           



                                                  18:02:00           



                                                  CST, stop           



                                                  date           



                                                  Limited #           



                                                  of times           

 

     Dilaudid            No   Ghassan K                0.5 mg =           [facility]

[facility]



               3-11           Chun                0.25 mL,           l



               00:02:                               Injection,           [place]



               00                                 IV Push,           



                                                  q10min PRN           



                                                  for pain           



                                                  severe           



                                                  (7-10),           



                                                  first dose           



                                                  03/10/16           



                                                  18:02:00           



                                                  CST            

 

     diphenhydrA            No   Ghassan K                25 mg =           M

[facility]



     MINE      3-11           Chun                0.5 mL,           l



               00:02:                               Injection,           [place]



               00                                 IV Push,           



                                                  Once PRN           



                                                  for            



                                                  itching,           



                                                  first dose           



                                                  03/10/16           



                                                  18:02:00           



                                                  CST            

 

     LR 1,000 mL            No   Ghassan K                1,000 mL,          

 [facility]



               3-11           Chun                IV, 75           l



               00:02:                               mL/hr,           [place]



               00                                 start date           



                                                  03/10/16           



                                                  18:02:00           



                                                  CST            

 

     Saline Lock            No   Ghassan K                10 mL,           Me

[facility]



     Flush      3-11           Chun                Soln, IV           l



               00:02:                               Push, As           [facility]



               00                                 Indicated           



                                                  PRN for           



                                                  flush,           



                                                  first dose           



                                                  03/10/16           



                                                  18:02:00           



                                                  CST            

 

     Bupivacaine            No   Ghassan K                300 mL,           M

[facility]



     0.25% 300      3-11           Chun                Nerve           l



     mL pump 300      00:02:                               Block, 5           [facility]

[facility]



     mL        00                                 mL/hr,           



                                                  start date           



                                                  03/10/16           



                                                  18:02:00           



                                                  CST            

 

     promethazin            No   Ghassan K                12.5 mg =          

 [facility]



     e         3-11           Chun                0.5 mL,           l



               00:02:                               Injection,           [place]



               00                                 IM, Once           



                                                  PRN for           



                                                  severe           



                                                  nausea,           



                                                  first dose           



                                                  03/10/16           



                                                  18:02:00           



                                                  CST            

 

     albuterol            No   Ghassan K                2.5 mg = 3           

[facility]



     2.5 mg/3 mL      3-11           Chun                mL, Soln,           

l



     (0.083%)      00:02:                               NEB, Once           [facility]

d[facility]



     inhalation      00                                 PRN for           



     solution                                         wheezing,           



                                                  first dose           



                                                  03/10/16           



                                                  18:02:00           



                                                  CST            

 

     Demerol HCl            No   Ghassan K                12.5 mg =          

 [facility]



               3-11           Chun                0.25 mL,           l



               00:02:                               Injection,           [place]



               00                                 IV Push,           



                                                  Once PRN           



                                                  for            



                                                  shivers,           



                                                  first dose           



                                                  03/10/16           



                                                  18:02:00           



                                                  CST            

 

     ondansetron            No   Ghassan K                4 mg = 2           

[facility]



               3-11           Chun                mL,            l



               00:02:                               Injection,           [place]



               00                                 IV Push,           



                                                  q15min PRN           



                                                  for            



                                                  nausea/vom           



                                                  iting,           



                                                  order           



                                                  duration:           



                                                  2 doses,           



                                                  first dose           



                                                  03/10/16           



                                                  18:02:00           



                                                  CST, stop           



                                                  date           



                                                  Limited #           



                                                  of times           

 

     Dilaudid            No   Ghassan K                0.5 mg =           [facility]

[facility]



               3-11           Chun                0.25 mL,           l



               00:02:                               Injection,           [place]



               00                                 IV Push,           



                                                  q10min PRN           



                                                  for pain           



                                                  severe           



                                                  (7-10),           



                                                  first dose           



                                                  03/10/16           



                                                  18:02:00           



                                                  CST            

 

     diphenhydrA            No   Ghassan K                25 mg =           M

[facility]



     MINE      3-11           Chun                0.5 mL,           l



               00:02:                               Injection,           [place]



               00                                 IV Push,           



                                                  Once PRN           



                                                  for            



                                                  itching,           



                                                  first dose           



                                                  03/10/16           



                                                  18:02:00           



                                                  CST            

 

     LR 1,000 mL            No   Ghassan K                1,000 mL,          

 [facility]



               3-11           Chun                IV, 75           l



               00:02:                               mL/hr,           [place]



               00                                 start date           



                                                  03/10/16           



                                                  18:02:00           



                                                  CST            

 

     Saline Lock            No   Ghassan K                10 mL,           Me

[facility]



     Flush      3-11           Chun                Soln, IV           l



               00:02:                               Push, As           [place]



               00                                 Indicated           



                                                  PRN for           



                                                  flush,           



                                                  first dose           



                                                  03/10/16           



                                                  18:02:00           



                                                  CST            

 

     Bupivacaine            No   Ghassan K                300 mL,           M

[facility]



     0.25% 300      3-11           Chun                Nerve           l



     mL pump 300      00:02:                               Block, 5           [facility]

[facility]



     mL        00                                 mL/hr,           



                                                  start date           



                                                  03/10/16           



                                                  18:02:00           



                                                  CST            

 

     promethazin            No   Ghassan K                12.5 mg =          

 [facility]



     e         3-11           Chun                0.5 mL,           l



               00:02:                               Injection,           [place]



               00                                 IM, Once           



                                                  PRN for           



                                                  severe           



                                                  nausea,           



                                                  first dose           



                                                  03/10/16           



                                                  18:02:00           



                                                  CST            

 

     albuterol            No   Ghassan K                2.5 mg = 3           

[facility]



     2.5 mg/3 mL      3-11           [facility]un                mL, Soln,           

l



     (0.083%)      00:02:                               NEB, Once           [facility]

[facility]



     inhalation      00                                 PRN for           



     solution                                         wheezing,           



                                                  first dose           



                                                  03/10/16           



                                                  18:02:00           



                                                  CST            

 

     Demerol HCl            No   Ghassan K                12.5 mg =          

 [facility]



               3-11           [facility]un                0.25 mL,           l



               00:02:                               Injection,           [place]



               00                                 IV Push,           



                                                  Once PRN           



                                                  for            



                                                  shivers,           



                                                  first dose           



                                                  03/10/16           



                                                  18:02:00           



                                                  CST            

 

     ondansetron            No   Ghassan K                4 mg = 2           

[facility]



               3-11           [facility]                mL,            l



               00:02:                               Injection,           [place]



               00                                 IV Push,           



                                                  q15min PRN           



                                                  for            



                                                  nausea/vom           



                                                  iting,           



                                                  order           



                                                  duration:           



                                                  2 doses,           



                                                  first dose           



                                                  03/10/16           



                                                  18:02:00           



                                                  CST, stop           



                                                  date           



                                                  Limited #           



                                                  of times           

 

     Dilaudid            No   Ghassan K                0.5 mg =           [facility]

[facility]



               3-11           [facility]un                0.25 mL,           l



               00:02:                               Injection,           [place]



               00                                 IV Push,           



                                                  q10min PRN           



                                                  for pain           



                                                  severe           



                                                  (7-10),           



                                                  first dose           



                                                  03/10/16           



                                                  18:02:00           



                                                  CST            

 

     diphenhydrA            No   Ghassan K                25 mg =           M

[facility]



     MINE      3-11           [facility]un                0.5 mL,           l



               00:02:                               Injection,           [place]



               00                                 IV Push,           



                                                  Once PRN           



                                                  for            



                                                  itching,           



                                                  first dose           



                                                  03/10/16           



                                                  18:02:00           



                                                  CST            

 

     LR 1,000 mL            No   Ghassan K                1,000 mL,          

 [facility]



               3-11           [facility]                IV, 75           l



               00:02:                               mL/hr,           [place]



                                                start date           



                                                  03/10/16           



                                                  18:02:00           



                                                  CST            

 

     Saline Lock            No   Ghassan K                10 mL,           Me

[facility]



     Flush      3-11           Chun                Soln, IV           l



               00:02:                               Push, As           [place]



               00                                 Indicated           



                                                  PRN for           



                                                  flush,           



                                                  first dose           



                                                  03/10/16           



                                                  18:02:00           



                                                  CST            

 

     Bupivacaine            No   Ghassan K                300 mL,           M

[facility]



     0.25% 300      3-11           Chun                Nerve           l



     mL pump 300      00:02:                               Block, 5           [facility]

[facility]



     mL        00                                 mL/hr,           



                                                  start date           



                                                  03/10/16           



                                                  18:02:00           



                                                  CST            

 

     promethazin            No   Ghassan K                12.5 mg =          

 [facility]



     e         3-11           [facility]                0.5 mL,           l



               00:02:                               Injection,           [place]



               00                                 IM, Once           



                                                  PRN for           



                                                  severe           



                                                  nausea,           



                                                  first dose           



                                                  03/10/16           



                                                  18:02:00           



                                                  CST            

 

     albuterol            No   Ghassan K                2.5 mg = 3           

[facility]



     2.5 mg/3 mL      3-11           [facility]                mL, Soln,           

l



     (0.083%)      00:02:                               NEB, Once           [facility]

[facility]



     inhalation      00                                 PRN for           



     solution                                         wheezing,           



                                                  first dose           



                                                  03/10/16           



                                                  18:02:00           



                                                  CST            

 

     Demerol HCl            No   Ghassan K                12.5 mg =          

 [facility]



               3-11           [facility]                0.25 mL,           l



               00:02:                               Injection,           [place]



               00                                 IV Push,           



                                                  Once PRN           



                                                  for            



                                                  shivers,           



                                                  first dose           



                                                  03/10/16           



                                                  18:02:00           



                                                  CST            

 

     ondansetron            No   Ghassan K                4 mg = 2           

[facility]



               3-11           [facility]                mL,            l



               00:02:                               Injection,           [place]



               00                                 IV Push,           



                                                  q15min PRN           



                                                  for            



                                                  nausea/vom           



                                                  iting,           



                                                  order           



                                                  duration:           



                                                  2 doses,           



                                                  first dose           



                                                  03/10/16           



                                                  18:02:00           



                                                  CST, stop           



                                                  date           



                                                  Limited #           



                                                  of times           

 

     Dilaudid            No   Ghassan K                0.5 mg =           [facility]

[facility]



               3-11           [facility]                0.25 mL,           l



               00:02:                               Injection,           [place]



               00                                 IV Push,           



                                                  q10min PRN           



                                                  for pain           



                                                  severe           



                                                  (7-10),           



                                                  first dose           



                                                  03/10/16           



                                                  18:02:00           



                                                  CST            

 

     diphenhydrA            No   Ghassan K                25 mg =           M

[facility]



     MINE      3-11           [facility]                0.5 mL,           l



               00:02:                               Injection,           [place]



               00                                 IV Push,           



                                                  Once PRN           



                                                  for            



                                                  itching,           



                                                  first dose           



                                                  03/10/16           



                                                  18:02:00           



                                                  CST            

 

     LR 1,000 mL            No   Ghassan K                1,000 mL,          

 [facility]



               3-11           [facility]                IV, 75           l



               00:02:                               mL/hr,           [place]



               00                                 start date           



                                                  03/10/16           



                                                  18:02:00           



                                                  CST            

 

     Saline Lock            No   Ghassan K                10 mL,           Me

[facility]



     Flush      3-11           [facility]                Soln, IV           l



               00:02:                               Push, As           [place]



               00                                 Indicated           



                                                  PRN for           



                                                  flush,           



                                                  first dose           



                                                  03/10/16           



                                                  18:02:00           



                                                  CST            

 

     Bupivacaine            No   Ghassan K                300 mL,           M

[facility]



     0.25% 300      3-11           [facility]                Nerve           l



     mL pump 300      00:02:                               Block, 5           [facility]

[facility]



     mL        00                                 mL/hr,           



                                                  start date           



                                                  03/10/16           



                                                  18:02:00           



                                                  CST            

 

     promethazin            No   Ghassan K                12.5 mg =          

 [facility]



     e         3-11           [facility]                0.5 mL,           l



               00:02:                               Injection,           [place]



               00                                 IM, Once           



                                                  PRN for           



                                                  severe           



                                                  nausea,           



                                                  first dose           



                                                  03/10/16           



                                                  18:02:00           



                                                  CST            

 

     albuterol            No   Ghassan K                2.5 mg = 3           

[facility]



     2.5 mg/3 mL      3-11           [facility]                mL, Soln,           

l



     (0.083%)      00:02:                               NEB, Once           [facility]

[facility]



     inhalation      00                                 PRN for           



     solution                                         wheezing,           



                                                  first dose           



                                                  03/10/16           



                                                  18:02:00           



                                                  CST            

 

     Demerol HCl            No   Ghassan K                12.5 mg =          

 [facility]



               3-11           [facility]                0.25 mL,           l



               00:02:                               Injection,           [place]



               00                                 IV Push,           



                                                  Once PRN           



                                                  for            



                                                  shivers,           



                                                  first dose           



                                                  03/10/16           



                                                  18:02:00           



                                                  CST            

 

     ondansetron            No   Ghassan K                4 mg = 2           

[facility]



               3-11           [facility]                mL,            l



               00:02:                               Injection,           [place]



               00                                 IV Push,           



                                                  q15min PRN           



                                                  for            



                                                  nausea/vom           



                                                  iting,           



                                                  order           



                                                  duration:           



                                                  2 doses,           



                                                  first dose           



                                                  03/10/16           



                                                  18:02:00           



                                                  CST, stop           



                                                  date           



                                                  Limited #           



                                                  of times           

 

     Dilaudid            No   Ghassan K                0.5 mg =           [facility]

[facility]



               3-11           [facility]                0.25 mL,           l



               00:02:                               Injection,           [place]



               00                                 IV Push,           



                                                  q10min PRN           



                                                  for pain           



                                                  severe           



                                                  (7-10),           



                                                  first dose           



                                                  03/10/16           



                                                  18:02:00           



                                                  CST            

 

     diphenhydrA            No   Ghassan K                25 mg =           M

[facility]



     MINE      3-11           Chun                0.5 mL,           l



               00:02:                               Injection,           [place]



               00                                 IV Push,           



                                                  Once PRN           



                                                  for            



                                                  itching,           



                                                  first dose           



                                                  03/10/16           



                                                  18:02:00           



                                                  CST            

 

     LR 1,000 mL            No   Ghassan K                1,000 mL,          

 [facility]



               3-11           Chun                IV, 75           l



               00:02:                               mL/hr,           [place]



               00                                 start date           



                                                  03/10/16           



                                                  18:02:00           



                                                  CST            

 

     Saline Lock            No   Ghassan K                10 mL,           Me

[facility]



     Flush      3-11           Chun                Soln, IV           l



               00:02:                               Push, As           [place]



               00                                 Indicated           



                                                  PRN for           



                                                  flush,           



                                                  first dose           



                                                  03/10/16           



                                                  18:02:00           



                                                  CST            

 

     Bupivacaine            No   Ghassan K                300 mL,           M

[facility]



     0.25% 300      3-11           Chun                Nerve           l



     mL pump 300      00:02:                               Block, 5           [facility]

[facility]



     mL        00                                 mL/hr,           



                                                  start date           



                                                  03/10/16           



                                                  18:02:00           



                                                  CST            

 

     promethazin            No   Ghassan K                12.5 mg =          

 [facility]



     e         3-11           Chun                0.5 mL,           l



               00:02:                               Injection,           [place]



               00                                 IM, Once           



                                                  PRN for           



                                                  severe           



                                                  nausea,           



                                                  first dose           



                                                  03/10/16           



                                                  18:02:00           



                                                  CST            

 

     albuterol            No   Ghassan K                2.5 mg = 3           

[facility]



     2.5 mg/3 mL      3-11           Chun                mL, Soln,           

l



     (0.083%)      00:02:                               NEB, Once           [facility]

daryl



     inhalation      00                                 PRN for           



     solution                                         wheezing,           



                                                  first dose           



                                                  03/10/16           



                                                  18:02:00           



                                                  CST            

 

     Demerol HCl            No   Ghassan K                12.5 mg =          

 [facility]



               3-11           Chun                0.25 mL,           l



               00:02:                               Injection,           [place]



               00                                 IV Push,           



                                                  Once PRN           



                                                  for            



                                                  shivers,           



                                                  first dose           



                                                  03/10/16           



                                                  18:02:00           



                                                  CST            

 

     ondansetron            No   Ghassan K                4 mg = 2           

[facility]



               3-11           Chun                mL,            l



               00:02:                               Injection,           [place]



               00                                 IV Push,           



                                                  q15min PRN           



                                                  for            



                                                  nausea/vom           



                                                  iting,           



                                                  order           



                                                  duration:           



                                                  2 doses,           



                                                  first dose           



                                                  03/10/16           



                                                  18:02:00           



                                                  CST, stop           



                                                  date           



                                                  Limited #           



                                                  of times           

 

     Dilaudid            No   Ghassan K                0.5 mg =           [facility]

[facility]



               3-11           Chun                0.25 mL,           l



               00:02:                               Injection,           [place]



               00                                 IV Push,           



                                                  q10min PRN           



                                                  for pain           



                                                  severe           



                                                  (7-10),           



                                                  first dose           



                                                  03/10/16           



                                                  18:02:00           



                                                  CST            

 

     diphenhydrA            No   Ghassan K                25 mg =           M

[facility]



     MINE      3-11           Chun                0.5 mL,           l



               00:02:                               Injection,           [place]                                 IV Push,           



                                                  Once PRN           



                                                  for            



                                                  itching,           



                                                  first dose           



                                                  03/10/16           



                                                  18:02:00           



                                                  CST            

 

     LR 1,000 mL            No   Ghassan K                1,000 mL,          

 [facility]



               3-11           Chun                IV, 75           l



               00:02:                               mL/hr,           [place]                                 start date           



                                                  03/10/16           



                                                  18:02:00           



                                                  CST            

 

     Saline Lock            No   Ghassan K                10 mL,           Me

[facility]



     Flush      3-11           Chun                Soln, IV           l



               00:02:                               Push, As                                            Indicated           



                                                  PRN for           



                                                  flush,           



                                                  first dose           



                                                  03/10/16           



                                                  18:02:00           



                                                  CST            

 

     Bupivacaine            No   Ghassan K                300 mL,           M

[facility]



     0.25% 300      3-11           Chun                Nerve           l



     mL pump 300      00:02:                               Block, 5           He

[facility]



     mL        00                                 mL/hr,           



                                                  start date           



                                                  03/10/16           



                                                  18:02:00           



                                                  CST            

 

     Misc            No   Deuce                1,000 mL,           [facility]

a



     Medication      3-10           [place]                Soln-IV,           l



               23:42:                               IV, Once,                                            first dose           



                                                  03/10/16           



                                                  17:42:00           



                                                  CST, stop           



                                                  date           



                                                  03/10/16           



                                                  17:42:00           



                                                  CST            

 

     Misc            No   Deuce                1,000 mL,           [facility]

a



     Medication      3-10           [place]                Soln-IV,           l



               23:42:                               IV, Once,                                            first dose           



                                                  03/10/16           



                                                  17:42:00           



                                                  CST, stop           



                                                  date           



                                                  03/10/16           



                                                  17:42:00           



                                                  CST            

 

     Misc            No   Deuce                1,000 mL,           [facility]

a



     Medication      3-10           [place]                Soln-IV,           l



               23:42:                               IV, Once,                                            first dose           



                                                  03/10/16           



                                                  17:42:00           



                                                  CST, stop           



                                                  date           



                                                  03/10/16           



                                                  17:42:00           



                                                  CST            

 

     Misc            No   Deuce                1,000 mL,           [facility]

a



     Medication      3-10           [place]                Soln-IV,           l



               23:42:                               IV, Once,                                            first dose           



                                                  03/10/16           



                                                  17:42:00           



                                                  CST, stop           



                                                  date           



                                                  03/10/16           



                                                  17:42:00           



                                                  CST            

 

     Misc            No   Deuce                1,000 mL,           [facility]

a



     Medication      3-10           [place]                Soln-IV,           l



               23:42:                               IV, Once,           [place]



               00                                 first dose           



                                                  03/10/16           



                                                  17:42:00           



                                                  CST, stop           



                                                  date           



                                                  03/10/16           



                                                  17:42:00           



                                                  CST            

 

     Misc            No   Deuce                1,000 mL,           [facility]

a



     Medication      3-10           [place]                Soln-IV,           l



               23:42:                               IV, Once,           [place]



               00                                 first dose           



                                                  03/10/16           



                                                  17:42:00           



                                                  CST, stop           



                                                  date           



                                                  03/10/16           



                                                  17:42:00           



                                                  CST            

 

     Misc            No   Deuce                1,000 mL,           [facility]

a



     Medication      3-10           [place]                Soln-IV,           l



               23:42:                               IV, Once,           [place]



               00                                 first dose           



                                                  03/10/16           



                                                  17:42:00           



                                                  CST, stop           



                                                  date           



                                                  03/10/16           



                                                  17:42:00           



                                                  CST            

 

     Misc            No   Deuce                1,000 mL,           [facility]

a



     Medication      3-10           [place]                Soln-IV,           l



               23:42:                               IV, Once,           [place]



               00                                 first dose           



                                                  03/10/16           



                                                  17:42:00           



                                                  CST, stop           



                                                  date           



                                                  03/10/16           



                                                  17:42:00           



                                                  CST            

 

     Misc            No   Deuce                1,000 mL,           [facility]

a



     Medication      3-10           [place]                Soln-IV,           l



               23:42:                               IV, Once,           [place]



               00                                 first dose           



                                                  03/10/16           



                                                  17:42:00           



                                                  CST, stop           



                                                  date           



                                                  03/10/16           



                                                  17:42:00           



                                                  CST            

 

     Misc            No   Deuce                1,000 mL,           [facility]

a



     Medication      3-10           [place]                Soln-IV,           l



               23:42:                               IV, Once,           [place]



               00                                 first dose           



                                                  03/10/16           



                                                  17:42:00           



                                                  CST, stop           



                                                  date           



                                                  03/10/16           



                                                  17:42:00           



                                                  CST            

 

     fentaNYL      0      No   Deuce                25 mcg =           [facility]

[facility]



               3-10           [place]                0.5 mL,           l



               23:20:                               Injection,           [place]



               00                                 IV, Once,           



                                                  first dose           



                                                  03/10/16           



                                                  17:20:00           



                                                  CST, stop           



                                                  date           



                                                  03/10/16           



                                                  17:20:00           



                                                  CST            

 

     fentaNYL      -0      No   Deuce                25 mcg =           [facility]

[facility]



               3-10           [place]                0.5 mL,           l



               23:20:                               Injection,           [place]



               00                                 IV, Once,           



                                                  first dose           



                                                  03/10/16           



                                                  17:20:00           



                                                  CST, stop           



                                                  date           



                                                  03/10/16           



                                                  17:20:00           



                                                  CST            

 

     fentaNYL      -0      No   Deuce                25 mcg =           [facility]

[facility]



               3-10           [place]                0.5 mL,           l



               23:20:                               Injection,           [place]



               00                                 IV, Once,           



                                                  first dose           



                                                  03/10/16           



                                                  17:20:00           



                                                  CST, stop           



                                                  date           



                                                  03/10/16           



                                                  17:20:00           



                                                  CST            

 

     fentaNYL      -0      No   Deuce                25 mcg =           [facility]

[facility]



               3-10           [place]                0.5 mL,           l



               23:20:                               Injection,           [place]



               00                                 IV, Once,           



                                                  first dose           



                                                  03/10/16           



                                                  17:20:00           



                                                  CST, stop           



                                                  date           



                                                  03/10/16           



                                                  17:20:00           



                                                  CST            

 

     fentaNYL      -      No   Deuce                25 mcg =           [facility]

[facility]



               3-10           [place]                0.5 mL,           l



               23:20:                               Injection,           [place]



               00                                 IV, Once,           



                                                  first dose           



                                                  03/10/16           



                                                  17:20:00           



                                                  CST, stop           



                                                  date           



                                                  03/10/16           



                                                  17:20:00           



                                                  CST            

 

     fentaNYL      -      No   Deuce                25 mcg =           [facility]

[facility]



               3-10           [place]                0.5 mL,           l



               23:20:                               Injection,           [place]



               00                                 IV, Once,           



                                                  first dose           



                                                  03/10/16           



                                                  17:20:00           



                                                  CST, stop           



                                                  date           



                                                  03/10/16           



                                                  17:20:00           



                                                  CST            

 

     fentaNYL      -0      No   Deuce                25 mcg =           [facility]

[facility]



               3-10           [place]                0.5 mL,           l



               23:20:                               Injection,           [place]



               00                                 IV, Once,           



                                                  first dose           



                                                  03/10/16           



                                                  17:20:00           



                                                  CST, stop           



                                                  date           



                                                  03/10/16           



                                                  17:20:00           



                                                  CST            

 

     fentaNYL      -0      No   Deuce                25 mcg =           [facility]

[facility]



               3-10           Wheat                0.5 mL,           l



               23:20:                               Injection,           [place]



               00                                 IV, Once,           



                                                  first dose           



                                                  03/10/16           



                                                  17:20:00           



                                                  CST, stop           



                                                  date           



                                                  03/10/16           



                                                  17:20:00           



                                                  CST            

 

     fentaNYL      -0      No   Deuce                25 mcg =           [facility]

[facility]



               3-10           Wheat                0.5 mL,           l



               23:20:                               Injection,           [place]



               00                                 IV, Once,           



                                                  first dose           



                                                  03/10/16           



                                                  17:20:00           



                                                  CST, stop           



                                                  date           



                                                  03/10/16           



                                                  17:20:00           



                                                  CST            

 

     fentaNYL      -0      No   Deuce                25 mcg =           [facility]

[facility]



               3-10           Wheat                0.5 mL,           l



               23:20:                               Injection,           [place]



               00                                 IV, Once,           



                                                  first dose           



                                                  03/10/16           



                                                  17:20:00           



                                                  CST, stop           



                                                  date           



                                                  03/10/16           



                                                  17:20:00           



                                                  CST            

 

     ondansetron      2016-0      No   Deuce                4 mg = 2           

[facility]



               3-10           [place]                mL,            l



               23:03:                               Injection,           [place]



               00                                 IV, Once,           



                                                  first dose           



                                                  03/10/16           



                                                  17:03:00           



                                                  CST, stop           



                                                  date           



                                                  03/10/16           



                                                  17:03:00           



                                                  CST            

 

     ondansetron      2016-0      No   Deuce                4 mg = 2           

[facility]



               3-10           [place]                mL,            l



               23:03:                               Injection,           [place]



               00                                 IV, Once,           



                                                  first dose           



                                                  03/10/16           



                                                  17:03:00           



                                                  CST, stop           



                                                  date           



                                                  03/10/16           



                                                  17:03:00           



                                                  CST            

 

     ondansetron      2016-0      No   Deuce                4 mg = 2           

[facility]



               3-10           Wheat                mL,            l



               23:03:                               Injection,           [place]



               00                                 IV, Once,           



                                                  first dose           



                                                  03/10/16           



                                                  17:03:00           



                                                  CST, stop           



                                                  date           



                                                  03/10/16           



                                                  17:03:00           



                                                  CST            

 

     ondansetron      2016-0      No   Deuce                4 mg = 2           

[facility]



               3-10           Wheat                mL,            l



               23:03:                               Injection,           [place]



               00                                 IV, Once,           



                                                  first dose           



                                                  03/10/16           



                                                  17:03:00           



                                                  CST, stop           



                                                  date           



                                                  03/10/16           



                                                  17:03:00           



                                                  CST            

 

     ondansetron      2016-0      No   Deuce                4 mg = 2           

[facility]



               3-10           [place]                mL,            l



               23:03:                               Injection,           [place]



               00                                 IV, Once,           



                                                  first dose           



                                                  03/10/16           



                                                  17:03:00           



                                                  CST, stop           



                                                  date           



                                                  03/10/16           



                                                  17:03:00           



                                                  CST            

 

     ondansetron      2016-0      No   Deuce                4 mg = 2           

[facility]



               3-10           [place]                mL,            l



               23:03:                               Injection,           [place]



               00                                 IV, Once,           



                                                  first dose           



                                                  03/10/16           



                                                  17:03:00           



                                                  CST, stop           



                                                  date           



                                                  03/10/16           



                                                  17:03:00           



                                                  CST            

 

     ondansetron      2016-0      No   Deuce                4 mg = 2           

[facility]



               3-10           [place]heat                mL,            l



               23:03:                               Injection,           [place]



               00                                 IV, Once,           



                                                  first dose           



                                                  03/10/16           



                                                  17:03:00           



                                                  CST, stop           



                                                  date           



                                                  03/10/16           



                                                  17:03:00           



                                                  CST            

 

     ondansetron      2016-0      No   Deuce                4 mg = 2           

[facility]



               3-10           [place]                mL,            l



               23:03:                               Injection,           [place]



               00                                 IV, Once,           



                                                  first dose           



                                                  03/10/16           



                                                  17:03:00           



                                                  CST, stop           



                                                  date           



                                                  03/10/16           



                                                  17:03:00           



                                                  CST            

 

     ondansetron            No   Deuce                4 mg = 2           

[facility]



               3-10           [place]                mL,            l



               23:03:                               Injection,           [place]



               00                                 IV, Once,           



                                                  first dose           



                                                  03/10/16           



                                                  17:03:00           



                                                  CST, stop           



                                                  date           



                                                  03/10/16           



                                                  17:03:00           



                                                  CST            

 

     ondansetron            No   Deuce                4 mg = 2           

[facility]



               3-10           [place]                mL,            l



               23:03:                               Injection,           [place]



               00                                 IV, Once,           



                                                  first dose           



                                                  03/10/16           



                                                  17:03:00           



                                                  CST, stop           



                                                  date           



                                                  03/10/16           



                                                  17:03:00           



                                                  CST            

 

     fentaNYL            No   Deuce                25 mcg =           [facility]

[facility]



               3-10           [place]                0.5 mL,           l



               23:00:                               Injection,           [place]



               00                                 IV, Once,           



                                                  first dose           



                                                  03/10/16           



                                                  17:00:00           



                                                  CST, stop           



                                                  date           



                                                  03/10/16           



                                                  17:00:00           



                                                  CST            

 

     fentaNYL            No   Deuce                25 mcg =           [facility]

[facility]



               3-10           [place]heat                0.5 mL,           l



               23:00:                               Injection,           [place]



               00                                 IV, Once,           



                                                  first dose           



                                                  03/10/16           



                                                  17:00:00           



                                                  CST, stop           



                                                  date           



                                                  03/10/16           



                                                  17:00:00           



                                                  CST            

 

     fentaNYL            No   Deuce                25 mcg =           [facility]

[facility]



               3-10           [place]                0.5 mL,           l



               23:00:                               Injection,           [place]



               00                                 IV, Once,           



                                                  first dose           



                                                  03/10/16           



                                                  17:00:00           



                                                  CST, stop           



                                                  date           



                                                  03/10/16           



                                                  17:00:00           



                                                  CST            

 

     fentaNYL            No   Deuce                25 mcg =           [facility]

[facility]



               3-10           [place]                0.5 mL,           l



               23:00:                               Injection,           [place]



               00                                 IV, Once,           



                                                  first dose           



                                                  03/10/16           



                                                  17:00:00           



                                                  CST, stop           



                                                  date           



                                                  03/10/16           



                                                  17:00:00           



                                                  CST            

 

     fentaNYL            No   Deuce                25 mcg =           [facility]

[facility]



               3-10           [place]                0.5 mL,           l



               23:00:                               Injection,           [place]



               00                                 IV, Once,           



                                                  first dose           



                                                  03/10/16           



                                                  17:00:00           



                                                  CST, stop           



                                                  date           



                                                  03/10/16           



                                                  17:00:00           



                                                  CST            

 

     fentaNYL      2016-0      No   Deuce                25 mcg =           [facility]

[facility]



               3-10           [place]                0.5 mL,           l



               23:00:                               Injection,           [place]



               00                                 IV, Once,           



                                                  first dose           



                                                  03/10/16           



                                                  17:00:00           



                                                  CST, stop           



                                                  date           



                                                  03/10/16           



                                                  17:00:00           



                                                  CST            

 

     fentaNYL      2016-0      No   Deuce                25 mcg =           [facility]

[facility]



               3-10           [place]                0.5 mL,           l



               23:00:                               Injection,           [place]



               00                                 IV, Once,           



                                                  first dose           



                                                  03/10/16           



                                                  17:00:00           



                                                  CST, stop           



                                                  date           



                                                  03/10/16           



                                                  17:00:00           



                                                  CST            

 

     fentaNYL      -0      No   Deuce                25 mcg =           [facility]

[facility]



               3-10           [place]                0.5 mL,           l



               23:00:                               Injection,           [place]



               00                                 IV, Once,           



                                                  first dose           



                                                  03/10/16           



                                                  17:00:00           



                                                  CST, stop           



                                                  date           



                                                  03/10/16           



                                                  17:00:00           



                                                  CST            

 

     fentaNYL      -0      No   Deuce                25 mcg =           [facility]

[facility]



               3-10           [place]heat                0.5 mL,           l



               23:00:                               Injection,           [place]



               00                                 IV, Once,           



                                                  first dose           



                                                  03/10/16           



                                                  17:00:00           



                                                  CST, stop           



                                                  date           



                                                  03/10/16           



                                                  17:00:00           



                                                  CST            

 

     fentaNYL      -0      No   Deuce                25 mcg =           [facility]

[facility]



               3-10           Wheat                0.5 mL,           l



               23:00:                               Injection,           [place]



               00                                 IV, Once,           



                                                  first dose           



                                                  03/10/16           



                                                  17:00:00           



                                                  CST, stop           



                                                  date           



                                                  03/10/16           



                                                  17:00:00           



                                                  CST            

 

     fentaNYL      -0      No   Deuce                25 mcg =           [facility]

[facility]



               3-10           [place]                0.5 mL,           l



               22:48:                               Injection,           [place]



               00                                 IV, Once,           



                                                  first dose           



                                                  03/10/16           



                                                  16:48:00           



                                                  CST, stop           



                                                  date           



                                                  03/10/16           



                                                  16:48:00           



                                                  CST            

 

     fentaNYL      -0      No   Deuce                25 mcg =           [facility]

[facility]



               3-10           [place]                0.5 mL,           l



               22:48:                               Injection,           [place]



               00                                 IV, Once,           



                                                  first dose           



                                                  03/10/16           



                                                  16:48:00           



                                                  CST, stop           



                                                  date           



                                                  03/10/16           



                                                  16:48:00           



                                                  CST            

 

     fentaNYL      2016-0      No   Deuce                25 mcg =           [facility]

[facility]



               3-10           [place]                0.5 mL,           l



               22:48:                               Injection,           [place]



               00                                 IV, Once,           



                                                  first dose           



                                                  03/10/16           



                                                  16:48:00           



                                                  CST, stop           



                                                  date           



                                                  03/10/16           



                                                  16:48:00           



                                                  CST            

 

     fentaNYL      2016-0      No   Deuce                25 mcg =           [facility]

[facility]



               3-10           [place]                0.5 mL,           l



               22:48:                               Injection,           [place]



               00                                 IV, Once,           



                                                  first dose           



                                                  03/10/16           



                                                  16:48:00           



                                                  CST, stop           



                                                  date           



                                                  03/10/16           



                                                  16:48:00           



                                                  CST            

 

     fentaNYL      2016-0      No   Deuce                25 mcg =           [facility]

[facility]



               3-10           Wheat                0.5 mL,           l



               22:48:                               Injection,           [place]



               00                                 IV, Once,           



                                                  first dose           



                                                  03/10/16           



                                                  16:48:00           



                                                  CST, stop           



                                                  date           



                                                  03/10/16           



                                                  16:48:00           



                                                  CST            

 

     fentaNYL      2016-0      No   Deuce                25 mcg =           [facility]

[facility]



               3-10           Wheat                0.5 mL,           l



               22:48:                               Injection,           [place]



               00                                 IV, Once,           



                                                  first dose           



                                                  03/10/16           



                                                  16:48:00           



                                                  CST, stop           



                                                  date           



                                                  03/10/16           



                                                  16:48:00           



                                                  CST            

 

     fentaNYL      2016-0      No   Deuce                25 mcg =           [facility]

[facility]



               3-10           Wheat                0.5 mL,           l



               22:48:                               Injection,           [place]



               00                                 IV, Once,           



                                                  first dose           



                                                  03/10/16           



                                                  16:48:00           



                                                  CST, stop           



                                                  date           



                                                  03/10/16           



                                                  16:48:00           



                                                  CST            

 

     fentaNYL      2016-0      No   Deuce                25 mcg =           [facility]

[facility]



               3-10           Wheat                0.5 mL,           l



               22:48:                               Injection,           [place]



               00                                 IV, Once,           



                                                  first dose           



                                                  03/10/16           



                                                  16:48:00           



                                                  CST, stop           



                                                  date           



                                                  03/10/16           



                                                  16:48:00           



                                                  CST            

 

     fentaNYL      2016-0      No   Deuce                25 mcg =           [facility]

[facility]



               3-10           Wheat                0.5 mL,           l



               22:48:                               Injection,           [place]



               00                                 IV, Once,           



                                                  first dose           



                                                  03/10/16           



                                                  16:48:00           



                                                  CST, stop           



                                                  date           



                                                  03/10/16           



                                                  16:48:00           



                                                  CST            

 

     fentaNYL      2016-0      No   Deuce                25 mcg =           [facility]

[facility]



               3-10           Wheat                0.5 mL,           l



               22:48:                               Injection,           [place]



               00                                 IV, Once,           



                                                  first dose           



                                                  03/10/16           



                                                  16:48:00           



                                                  CST, stop           



                                                  date           



                                                  03/10/16           



                                                  16:48:00           



                                                  CST            

 

     fentaNYL      2016-0      No   Deuce                25 mcg =           [facility]

[facility]



               3-10           Wheat                0.5 mL,           l



               22:37:                               Injection,           [place]



               00                                 IV, Once,           



                                                  first dose           



                                                  03/10/16           



                                                  16:37:00           



                                                  CST, stop           



                                                  date           



                                                  03/10/16           



                                                  16:37:00           



                                                  CST            

 

     fentaNYL      -0      No   Deuce                25 mcg =           [facility]

[facility]



               3-10           Wheat                0.5 mL,           l



               22:37:                               Injection,           [place]



               00                                 IV, Once,           



                                                  first dose           



                                                  03/10/16           



                                                  16:37:00           



                                                  CST, stop           



                                                  date           



                                                  03/10/16           



                                                  16:37:00           



                                                  CST            

 

     fentaNYL      -0      No   Deuce                25 mcg =           [facility]

[facility]



               3-10           Wheat                0.5 mL,           l



               22:37:                               Injection,           [place]



               00                                 IV, Once,           



                                                  first dose           



                                                  03/10/16           



                                                  16:37:00           



                                                  CST, stop           



                                                  date           



                                                  03/10/16           



                                                  16:37:00           



                                                  CST            

 

     fentaNYL      -      No   Deuce                25 mcg =           [facility]

[facility]



               3-10           Wheat                0.5 mL,           l



               22:37:                               Injection,           [place]



               00                                 IV, Once,           



                                                  first dose           



                                                  03/10/16           



                                                  16:37:00           



                                                  CST, stop           



                                                  date           



                                                  03/10/16           



                                                  16:37:00           



                                                  CST            

 

     fentaNYL      -      No   Deuce                25 mcg =           [facility]

[facility]



               3-10           Wheat                0.5 mL,           l



               22:37:                               Injection,           [place]



               00                                 IV, Once,           



                                                  first dose           



                                                  03/10/16           



                                                  16:37:00           



                                                  CST, stop           



                                                  date           



                                                  03/10/16           



                                                  16:37:00           



                                                  CST            

 

     fentaNYL      -      No   Deuce                25 mcg =           [facility]

[facility]



               3-10           Wheat                0.5 mL,           l



               22:37:                               Injection,           [place]



               00                                 IV, Once,           



                                                  first dose           



                                                  03/10/16           



                                                  16:37:00           



                                                  CST, stop           



                                                  date           



                                                  03/10/16           



                                                  16:37:00           



                                                  CST            

 

     fentaNYL      -0      No   Deuce                25 mcg =           [facility]

[facility]



               3-10           Wheat                0.5 mL,           l



               22:37:                               Injection,           [place]



               00                                 IV, Once,           



                                                  first dose           



                                                  03/10/16           



                                                  16:37:00           



                                                  CST, stop           



                                                  date           



                                                  03/10/16           



                                                  16:37:00           



                                                  CST            

 

     fentaNYL      -0      No   Deuce                25 mcg =           [facility]

[facility]



               3-10           Wheat                0.5 mL,           l



               22:37:                               Injection,           [place]



               00                                 IV, Once,           



                                                  first dose           



                                                  03/10/16           



                                                  16:37:00           



                                                  CST, stop           



                                                  date           



                                                  03/10/16           



                                                  16:37:00           



                                                  CST            

 

     fentaNYL      -0      No   Deuce                25 mcg =           [facility]

[facility]



               3-10           Wheat                0.5 mL,           l



               22:37:                               Injection,           [place]



               00                                 IV, Once,           



                                                  first dose           



                                                  03/10/16           



                                                  16:37:00           



                                                  CST, stop           



                                                  date           



                                                  03/10/16           



                                                  16:37:00           



                                                  CST            

 

     fentaNYL      2016-0      No   Deuce                25 mcg =           [facility]

[facility]



               3-10           [place]heat                0.5 mL,           l



               22:37:                               Injection,           [place]



               00                                 IV, Once,           



                                                  first dose           



                                                  03/10/16           



                                                  16:37:00           



                                                  CST, stop           



                                                  date           



                                                  03/10/16           



                                                  16:37:00           



                                                  CST            

 

     dexamethaso      2016-0      No   Deuce                8 mg = 2           

[facility]



     ne        3-10           [place]                mL,            l



               22:23:                               Injection,           [place]



               00                                 IV, Once,           



                                                  first dose           



                                                  03/10/16           



                                                  16:23:00           



                                                  CST, stop           



                                                  date           



                                                  03/10/16           



                                                  16:23:00           



                                                  CST            

 

     dexamethaso      2016-0      No   Deuce                8 mg = 2           

[facility]



     ne        3-10           [place]                mL,            l



               22:23:                               Injection,           [place]



               00                                 IV, Once,           



                                                  first dose           



                                                  03/10/16           



                                                  16:23:00           



                                                  CST, stop           



                                                  date           



                                                  03/10/16           



                                                  16:23:00           



                                                  CST            

 

     dexamethaso      2016-0      No   Deuce                8 mg = 2           

[facility]



     ne        3-10           [place]                mL,            l



               22:23:                               Injection,           [place]



               00                                 IV, Once,           



                                                  first dose           



                                                  03/10/16           



                                                  16:23:00           



                                                  CST, stop           



                                                  date           



                                                  03/10/16           



                                                  16:23:00           



                                                  CST            

 

     dexamethaso      2016-0      No   Deuce                8 mg = 2           

[facility]



     ne        3-10           [place]                mL,            l



               22:23:                               Injection,           [place]



               00                                 IV, Once,           



                                                  first dose           



                                                  03/10/16           



                                                  16:23:00           



                                                  CST, stop           



                                                  date           



                                                  03/10/16           



                                                  16:23:00           



                                                  CST            

 

     dexamethaso      2016-0      No   Deuce                8 mg = 2           

[facility]



     ne        3-10           [place]                mL,            l



               22:23:                               Injection,           [place]



               00                                 IV, Once,           



                                                  first dose           



                                                  03/10/16           



                                                  16:23:00           



                                                  CST, stop           



                                                  date           



                                                  03/10/16           



                                                  16:23:00           



                                                  CST            

 

     dexamethaso      2016-0      No   Deuce                8 mg = 2           

[facility]



     ne        3-10           [place]                mL,            l



               22:23:                               Injection,           [place]



               00                                 IV, Once,           



                                                  first dose           



                                                  03/10/16           



                                                  16:23:00           



                                                  CST, stop           



                                                  date           



                                                  03/10/16           



                                                  16:23:00           



                                                  CST            

 

     dexamethaso      2016-0      No   Deuce                8 mg = 2           

[facility]



     ne        3-10           [place]                mL,            l



               22:23:                               Injection,           [place]



               00                                 IV, Once,           



                                                  first dose           



                                                  03/10/16           



                                                  16:23:00           



                                                  CST, stop           



                                                  date           



                                                  03/10/16           



                                                  16:23:00           



                                                  CST            

 

     dexamethaso      -0      No   Deuce                8 mg = 2           

[facility]



     ne        3-10           [place]                mL,            l



               22:23:                               Injection,           [place]



               00                                 IV, Once,           



                                                  first dose           



                                                  03/10/16           



                                                  16:23:00           



                                                  CST, stop           



                                                  date           



                                                  03/10/16           



                                                  16:23:00           



                                                  CST            

 

     dexamethaso      -0      No   Deuce                8 mg = 2           

[facility]



     ne        3-10           [place]                mL,            l



               22:23:                               Injection,           [place]                                 IV, Once,           



                                                  first dose           



                                                  03/10/16           



                                                  16:23:00           



                                                  CST, stop           



                                                  date           



                                                  03/10/16           



                                                  16:23:00           



                                                  CST            

 

     dexamethaso      -0      No   Deuce                8 mg = 2           

[facility]



     ne        3-10           [place]                mL,            l



               22:23:                               Injection,           [place]                                 IV, Once,           



                                                  first dose           



                                                  03/10/16           



                                                  16:23:00           



                                                  CST, stop           



                                                  date           



                                                  03/10/16           



                                                  16:23:00           



                                                  CST            

 

     Misc      0      No   Deuce                1,000 mL,           [facility]

a



     Medication      3-10           [place]                Soln-IV,           l



               22:21:                               IV, Once,                                            first dose           



                                                  03/10/16           



                                                  16:21:00           



                                                  CST, stop           



                                                  date           



                                                  03/10/16           



                                                  16:21:00           



                                                  CST            

 

     Misc      -0      No   Deuce                1,000 mL,           [facility]

a



     Medication      3-10           [place]                Soln-IV,           l



               22:21:                               IV, Once,                                            first dose           



                                                  03/10/16           



                                                  16:21:00           



                                                  CST, stop           



                                                  date           



                                                  03/10/16           



                                                  16:21:00           



                                                  CST            

 

     Misc      -0      No   Deuce                1,000 mL,           [facility]

a



     Medication      3-10           [facility]                Soln-IV,           l



               22:21:                               IV, Once,                                            first dose           



                                                  03/10/16           



                                                  16:21:00           



                                                  CST, stop           



                                                  date           



                                                  03/10/16           



                                                  16:21:00           



                                                  CST            

 

     Misc      -0      No   Deuce                1,000 mL,           [facility]

a



     Medication      3-10           Wheat                Soln-IV,           l



               22:21:                               IV, Once,                                            first dose           



                                                  03/10/16           



                                                  16:21:00           



                                                  CST, stop           



                                                  date           



                                                  03/10/16           



                                                  16:21:00           



                                                  CST            

 

     Misc      2016-0      No   Deuce                1,000 mL,           [facility]

a



     Medication      3-10           Wheat                Soln-IV,           l



               22:21:                               IV, Once,           [place]



               00                                 first dose           



                                                  03/10/16           



                                                  16:21:00           



                                                  CST, stop           



                                                  date           



                                                  03/10/16           



                                                  16:21:00           



                                                  CST            

 

     Misc      2016-0      No   Deuce                1,000 mL,           [facility]

a



     Medication      3-10           Wheat                Soln-IV,           l



               22:21:                               IV, Once,           [place]



               00                                 first dose           



                                                  03/10/16           



                                                  16:21:00           



                                                  CST, stop           



                                                  date           



                                                  03/10/16           



                                                  16:21:00           



                                                  CST            

 

     Misc      2016-0      No   Deuce                1,000 mL,           [facility]

a



     Medication      3-10           Wheat                Soln-IV,           l



               22:21:                               IV, Once,           [place]



               00                                 first dose           



                                                  03/10/16           



                                                  16:21:00           



                                                  CST, stop           



                                                  date           



                                                  03/10/16           



                                                  16:21:00           



                                                  CST            

 

     Misc      2016-0      No   Deuce                1,000 mL,           [facility]

a



     Medication      3-10           Wheat                Soln-IV,           l



               22:21:                               IV, Once,           [place]



               00                                 first dose           



                                                  03/10/16           



                                                  16:21:00           



                                                  CST, stop           



                                                  date           



                                                  03/10/16           



                                                  16:21:00           



                                                  CST            

 

     Misc      -0      No   Deuce                1,000 mL,           [facility]

a



     Medication      3-10           Wheat                Soln-IV,           l



               22:21:                               IV, Once,           [place]



               00                                 first dose           



                                                  03/10/16           



                                                  16:21:00           



                                                  CST, stop           



                                                  date           



                                                  03/10/16           



                                                  16:21:00           



                                                  CST            

 

     Misc      -0      No   Deuce                1,000 mL,           [facility]

a



     Medication      3-10           Wheat                Soln-IV,           l



               22:21:                               IV, Once,           [place]



               00                                 first dose           



                                                  03/10/16           



                                                  16:21:00           



                                                  CST, stop           



                                                  date           



                                                  03/10/16           



                                                  16:21:00           



                                                  CST            

 

     clindamycin      -0      Yes  Deuce                928.125           [facility]

[facility]



               3-10           [facility]                mg,            l



               22:18:                               Soln-IV,           [place]



               00                                 IV, Once,           



                                                  first dose           



                                                  03/10/16           



                                                  16:18:00           



                                                  CST, stop           



                                                  date           



                                                  03/10/16           



                                                  16:18:00           



                                                  CST            

 

     clindamycin      -0      Yes  Deuce                928.125           [facility]

[facility]



               3-10           [facility]                mg,            l



               22:18:                               Soln-IV,           [place]



               00                                 IV, Once,           



                                                  first dose           



                                                  03/10/16           



                                                  16:18:00           



                                                  CST, stop           



                                                  date           



                                                  03/10/16           



                                                  16:18:00           



                                                  CST            

 

     clindamycin      2016-0      Yes  Deuce                928.125           [facility]

[facility]



               3-10           [facility]                mg,            l



               22:18:                               Soln-IV,           [place]



               00                                 IV, Once,           



                                                  first dose           



                                                  03/10/16           



                                                  16:18:00           



                                                  CST, stop           



                                                  date           



                                                  03/10/16           



                                                  16:18:00           



                                                  CST            

 

     clindamycin      -0      Yes  Deuce                928.125           [facility]

[facility]



               3-10           [facility]                mg,            l



               22:18:                               Soln-IV,           [place]



               00                                 IV, Once,           



                                                  first dose           



                                                  03/10/16           



                                                  16:18:00           



                                                  CST, stop           



                                                  date           



                                                  03/10/16           



                                                  16:18:00           



                                                  CST            

 

     clindamycin      -0      Yes  Deuce                928.125           [facility]

[facility]



               3-10           [facility]                mg,            l



               22:18:                               Soln-IV,           [place]



               00                                 IV, Once,           



                                                  first dose           



                                                  03/10/16           



                                                  16:18:00           



                                                  CST, stop           



                                                  date           



                                                  03/10/16           



                                                  16:18:00           



                                                  CST            

 

     clindamycin      -0      Yes  Deuce                928.125           [facility]

[facility]



               3-10           [facility]                mg,            l



               22:18:                               Soln-IV,           [place]



               00                                 IV, Once,           



                                                  first dose           



                                                  03/10/16           



                                                  16:18:00           



                                                  CST, stop           



                                                  date           



                                                  03/10/16           



                                                  16:18:00           



                                                  CST            

 

     clindamycin      -0      Yes  Deuce                928.125           [facility]

[facility]



               3-10           [facility]                mg,            l



               22:18:                               Soln-IV,           [place]



               00                                 IV, Once,           



                                                  first dose           



                                                  03/10/16           



                                                  16:18:00           



                                                  CST, stop           



                                                  date           



                                                  03/10/16           



                                                  16:18:00           



                                                  CST            

 

     clindamycin      -0      Yes  Deuce                928.125           [facility]

[facility]



               3-10           [facility]                mg,            l



               22:18:                               Soln-IV,           [place]



               00                                 IV, Once,           



                                                  first dose           



                                                  03/10/16           



                                                  16:18:00           



                                                  CST, stop           



                                                  date           



                                                  03/10/16           



                                                  16:18:00           



                                                  CST            

 

     clindamycin      -0      Yes  Deuce                928.125           [facility]

[facility]



               3-10           [facility]                mg,            l



               22:18:                               Soln-IV,           [place]



               00                                 IV, Once,           



                                                  first dose           



                                                  03/10/16           



                                                  16:18:00           



                                                  CST, stop           



                                                  date           



                                                  03/10/16           



                                                  16:18:00           



                                                  CST            

 

     clindamycin      -      Yes  Deuce                928.125           [facility]

[facility]



               3-10           [facility]                mg,            l



               22:18:                               Soln-IV,           [place]



               00                                 IV, Once,           



                                                  first dose           



                                                  03/10/16           



                                                  16:18:00           



                                                  CST, stop           



                                                  date           



                                                  03/10/16           



                                                  16:18:00           



                                                  CST            

 

     fentaNYL            No   Deuce                25 mcg =           [facility]

[facility]



               3-10           [facility]                0.5 mL,           l



               22:05:                               Injection,           [place]



               00                                 IV, Once,           



                                                  first dose           



                                                  03/10/16           



                                                  16:05:00           



                                                  CST, stop           



                                                  date           



                                                  03/10/16           



                                                  16:05:00           



                                                  CST            

 

     midazolam            No   Deuce                0.5 mg =           [facility]

[facility]



               3-10           [facility]                0.5 mL,           l



               22:05:                               Injection,           [place]



               00                                 IV, Once,           



                                                  first dose           



                                                  03/10/16           



                                                  16:05:00           



                                                  CST, stop           



                                                  date           



                                                  03/10/16           



                                                  16:05:00           



                                                  CST            

 

     fentaNYL            No   Deuce                25 mcg =           [facility]

[facility]



               3-10           [facility]                0.5 mL,           l



               22:05:                               Injection,           [place]



               00                                 IV, Once,           



                                                  first dose           



                                                  03/10/16           



                                                  16:05:00           



                                                  CST, stop           



                                                  date           



                                                  03/10/16           



                                                  16:05:00           



                                                  CST            

 

     midazolam            No   Deuce                0.5 mg =           [facility]

[facility]



               3-10           [facility]                0.5 mL,           l



               22:05:                               Injection,           [place]



               00                                 IV, Once,           



                                                  first dose           



                                                  03/10/16           



                                                  16:05:00           



                                                  CST, stop           



                                                  date           



                                                  03/10/16           



                                                  16:05:00           



                                                  CST            

 

     fentaNYL            No   Deuce                25 mcg =           [facility]

[facility]



               3-10           [facility]                0.5 mL,           l



               22:05:                               Injection,           [place]



               00                                 IV, Once,           



                                                  first dose           



                                                  03/10/16           



                                                  16:05:00           



                                                  CST, stop           



                                                  date           



                                                  03/10/16           



                                                  16:05:00           



                                                  CST            

 

     midazolam            No   Deuce                0.5 mg =           [facility]

[facility]



               3-10           [facility]                0.5 mL,           l



               22:05:                               Injection,           [place]



               00                                 IV, Once,           



                                                  first dose           



                                                  03/10/16           



                                                  16:05:00           



                                                  CST, stop           



                                                  date           



                                                  03/10/16           



                                                  16:05:00           



                                                  CST            

 

     fentaNYL            No   Deuce                25 mcg =           [facility]

[facility]



               3-10           [facility]                0.5 mL,           l



               22:05:                               Injection,           [place]



               00                                 IV, Once,           



                                                  first dose           



                                                  03/10/16           



                                                  16:05:00           



                                                  CST, stop           



                                                  date           



                                                  03/10/16           



                                                  16:05:00           



                                                  CST            

 

     midazolam      2016-0      No   Deuce                0.5 mg =           [facility]

[facility]



               3-10           [facility]                0.5 mL,           l



               22:05:                               Injection,           [place]



               00                                 IV, Once,           



                                                  first dose           



                                                  03/10/16           



                                                  16:05:00           



                                                  CST, stop           



                                                  date           



                                                  03/10/16           



                                                  16:05:00           



                                                  CST            

 

     fentaNYL      -0      No   Deuce                25 mcg =           [facility]

[facility]



               3-10           [facility]                0.5 mL,           l



               22:05:                               Injection,           [place]



               00                                 IV, Once,           



                                                  first dose           



                                                  03/10/16           



                                                  16:05:00           



                                                  CST, stop           



                                                  date           



                                                  03/10/16           



                                                  16:05:00           



                                                  CST            

 

     midazolam      -0      No   Deuce                0.5 mg =           [facility]

[facility]



               3-10           [facility]                0.5 mL,           l



               22:05:                               Injection,           [place]



               00                                 IV, Once,           



                                                  first dose           



                                                  03/10/16           



                                                  16:05:00           



                                                  CST, stop           



                                                  date           



                                                  03/10/16           



                                                  16:05:00           



                                                  CST            

 

     fentaNYL      -0      No   Deuce                25 mcg =           Mem

[facility]



               3-10           Wheat                0.5 mL,           l



               22:05:                               Injection,           [place]



               00                                 IV, Once,           



                                                  first dose           



                                                  03/10/16           



                                                  16:05:00           



                                                  CST, stop           



                                                  date           



                                                  03/10/16           



                                                  16:05:00           



                                                  CST            

 

     midazolam      -0      No   Deuce                0.5 mg =           [facility]

[facility]



               3-10           Wheat                0.5 mL,           l



               22:05:                               Injection,           [place]



               00                                 IV, Once,           



                                                  first dose           



                                                  03/10/16           



                                                  16:05:00           



                                                  CST, stop           



                                                  date           



                                                  03/10/16           



                                                  16:05:00           



                                                  CST            

 

     fentaNYL      2016-0      No   Deuce                25 mcg =           [facility]

[facility]



               3-10           Wheat                0.5 mL,           l



               22:05:                               Injection,           [place]



               00                                 IV, Once,           



                                                  first dose           



                                                  03/10/16           



                                                  16:05:00           



                                                  CST, stop           



                                                  date           



                                                  03/10/16           



                                                  16:05:00           



                                                  CST            

 

     midazolam      2016-0      No   Deuce                0.5 mg =           [facility]

[facility]



               3-10           Wheat                0.5 mL,           l



               22:05:                               Injection,           [place]



               00                                 IV, Once,           



                                                  first dose           



                                                  03/10/16           



                                                  16:05:00           



                                                  CST, stop           



                                                  date           



                                                  03/10/16           



                                                  16:05:00           



                                                  CST            

 

     fentaNYL      2016-0      No   Deuce                25 mcg =           [facility]

[facility]



               3-10           [place]heat                0.5 mL,           l



               22:05:                               Injection,           [place]



               00                                 IV, Once,           



                                                  first dose           



                                                  03/10/16           



                                                  16:05:00           



                                                  CST, stop           



                                                  date           



                                                  03/10/16           



                                                  16:05:00           



                                                  CST            

 

     midazolam      -0      No   Deuce                0.5 mg =           [facility]

[facility]



               3-10           Wheat                0.5 mL,           l



               22:05:                               Injection,           [place]



               00                                 IV, Once,           



                                                  first dose           



                                                  03/10/16           



                                                  16:05:00           



                                                  CST, stop           



                                                  date           



                                                  03/10/16           



                                                  16:05:00           



                                                  CST            

 

     fentaNYL            No   Deuce                25 mcg =           [facility]

[facility]



               3-10           [place]                0.5 mL,           l



               22:05:                               Injection,           [place]



               00                                 IV, Once,           



                                                  first dose           



                                                  03/10/16           



                                                  16:05:00           



                                                  CST, stop           



                                                  date           



                                                  03/10/16           



                                                  16:05:00           



                                                  CST            

 

     midazolam      -0      No   Deuce                0.5 mg =           [facility]

[facility]



               3-10           [place]                0.5 mL,           l



               22:05:                               Injection,           [place]



               00                                 IV, Once,           



                                                  first dose           



                                                  03/10/16           



                                                  16:05:00           



                                                  CST, stop           



                                                  date           



                                                  03/10/16           



                                                  16:05:00           



                                                  CST            

 

     fentaNYL            No   Deuce                25 mcg =           [facility]

[facility]



               3-10           [place]                0.5 mL,           l



               22:05:                               Injection,           [place]



               00                                 IV, Once,           



                                                  first dose           



                                                  03/10/16           



                                                  16:05:00           



                                                  CST, stop           



                                                  date           



                                                  03/10/16           



                                                  16:05:00           



                                                  CST            

 

     midazolam            No   Deuce                0.5 mg =           [facility]

[facility]



               3-10           Wheat                0.5 mL,           l



               22:05:                               Injection,           [place]



               00                                 IV, Once,           



                                                  first dose           



                                                  03/10/16           



                                                  16:05:00           



                                                  CST, stop           



                                                  date           



                                                  03/10/16           



                                                  16:05:00           



                                                  CST            

 

     lidocaine      -0      No   Deuce                3 mL,           [facility]

[facility]



               3-10           [place]heat                Injection,           l



               21:58:                               IV, Once,           [place]



               00                                 first dose           



                                                  03/10/16           



                                                  15:58:00           



                                                  CST, stop           



                                                  date           



                                                  03/10/16           



                                                  15:58:00           



                                                  CST            

 

     propofol      -0      No   Deuce                120 mg =           [facility]

[facility]



               3-10           [place]                12 mL,           l



               21:58:                               Emulsion,           [place]



               00                                 IV, Once,           



                                                  first dose           



                                                  03/10/16           



                                                  15:58:00           



                                                  CST, stop           



                                                  date           



                                                  03/10/16           



                                                  15:58:00           



                                                  CST            

 

     lidocaine      -0      No   Deuce                3 mL,           [facility]

[facility]



               3-10           [place]                Injection,           l



               21:58:                               IV, Once,           [place]



               00                                 first dose           



                                                  03/10/16           



                                                  15:58:00           



                                                  CST, stop           



                                                  date           



                                                  03/10/16           



                                                  15:58:00           



                                                  CST            

 

     propofol            No   Deuce                120 mg =           [facility]

[facility]



               3-10           [place]                12 mL,           l



               21:58:                               Emulsion,           [place]



               00                                 IV, Once,           



                                                  first dose           



                                                  03/10/16           



                                                  15:58:00           



                                                  CST, stop           



                                                  date           



                                                  03/10/16           



                                                  15:58:00           



                                                  CST            

 

     lidocaine            No   Deuce                3 mL,           [facility]

[facility]



               3-10           [place]                Injection,           l



               21:58:                               IV, Once,           [place]



               00                                 first dose           



                                                  03/10/16           



                                                  15:58:00           



                                                  CST, stop           



                                                  date           



                                                  03/10/16           



                                                  15:58:00           



                                                  CST            

 

     propofol            No   Deuce                120 mg =           [facility]

[facility]



               3-10           [place]                12 mL,           l



               21:58:                               Emulsion,           [place]



               00                                 IV, Once,           



                                                  first dose           



                                                  03/10/16           



                                                  15:58:00           



                                                  CST, stop           



                                                  date           



                                                  03/10/16           



                                                  15:58:00           



                                                  CST            

 

     lidocaine            No   Deuce                3 mL,           [facility]

[facility]



               3-10           [place]                Injection,           l



               21:58:                               IV, Once,           [place]



               00                                 first dose           



                                                  03/10/16           



                                                  15:58:00           



                                                  CST, stop           



                                                  date           



                                                  03/10/16           



                                                  15:58:00           



                                                  CST            

 

     propofol            No   Deuce                120 mg =           [facility]

[facility]



               3-10           [place]                12 mL,           l



               21:58:                               Emulsion,           [place]



               00                                 IV, Once,           



                                                  first dose           



                                                  03/10/16           



                                                  15:58:00           



                                                  CST, stop           



                                                  date           



                                                  03/10/16           



                                                  15:58:00           



                                                  CST            

 

     lidocaine            No   Deuce                3 mL,           [facility]

[facility]



               3-10           [place]                Injection,           l



               21:58:                               IV, Once,           [place]



               00                                 first dose           



                                                  03/10/16           



                                                  15:58:00           



                                                  CST, stop           



                                                  date           



                                                  03/10/16           



                                                  15:58:00           



                                                  CST            

 

     propofol            No   Deuce                120 mg =           [facility]

[facility]



               3-10           [place]                12 mL,           l



               21:58:                               Emulsion,           [place]



               00                                 IV, Once,           



                                                  first dose           



                                                  03/10/16           



                                                  15:58:00           



                                                  CST, stop           



                                                  date           



                                                  03/10/16           



                                                  15:58:00           



                                                  CST            

 

     lidocaine      -0      No   [facility]uce                3 mL,           [facility]

[facility]



               3-10           [place]                Injection,           l



               21:58:                               IV, Once,           [place]



               00                                 first dose           



                                                  03/10/16           



                                                  15:58:00           



                                                  CST, stop           



                                                  date           



                                                  03/10/16           



                                                  15:58:00           



                                                  CST            

 

     propofol            No   Deuce                120 mg =           [facility]

[facility]



               3-10           [place]                12 mL,           l



               21:58:                               Emulsion,           [place]



               00                                 IV, Once,           



                                                  first dose           



                                                  03/10/16           



                                                  15:58:00           



                                                  CST, stop           



                                                  date           



                                                  03/10/16           



                                                  15:58:00           



                                                  CST            

 

     lidocaine            No   Deuce                3 mL,           [facility]

[facility]



               3-10           [place]                Injection,           l



               21:58:                               IV, Once,           [place]



               00                                 first dose           



                                                  03/10/16           



                                                  15:58:00           



                                                  CST, stop           



                                                  date           



                                                  03/10/16           



                                                  15:58:00           



                                                  CST            

 

     propofol            No   Deuce                120 mg =           [facility]

[facility]



               3-10           [place]                12 mL,           l



               21:58:                               Emulsion,           [place]



               00                                 IV, Once,           



                                                  first dose           



                                                  03/10/16           



                                                  15:58:00           



                                                  CST, stop           



                                                  date           



                                                  03/10/16           



                                                  15:58:00           



                                                  CST            

 

     lidocaine            No   Deuce                3 mL,           [facility]

[facility]



               3-10           [place]                Injection,           l



               21:58:                               IV, Once,           [place]



               00                                 first dose           



                                                  03/10/16           



                                                  15:58:00           



                                                  CST, stop           



                                                  date           



                                                  03/10/16           



                                                  15:58:00           



                                                  CST            

 

     propofol            No   Deuce                120 mg =           [facility]

[facility]



               3-10           [place]                12 mL,           l



               21:58:                               Emulsion,           [place]



               00                                 IV, Once,           



                                                  first dose           



                                                  03/10/16           



                                                  15:58:00           



                                                  CST, stop           



                                                  date           



                                                  03/10/16           



                                                  15:58:00           



                                                  CST            

 

     lidocaine            No   Deuce                3 mL,           [facility]

[facility]



               3-10           [place]                Injection,           l



               21:58:                               IV, Once,           [place]



               00                                 first dose           



                                                  03/10/16           



                                                  15:58:00           



                                                  CST, stop           



                                                  date           



                                                  03/10/16           



                                                  15:58:00           



                                                  CST            

 

     propofol            No   Deuce                120 mg =           [facility]

[facility]



               3-10           [place]                12 mL,           l



               21:58:                               Emulsion,           [place]



               00                                 IV, Once,           



                                                  first dose           



                                                  03/10/16           



                                                  15:58:00           



                                                  CST, stop           



                                                  date           



                                                  03/10/16           



                                                  15:58:00           



                                                  CST            

 

     lidocaine            No   Deuce                3 mL,           [facility]

[facility]



               3-10           Wheat                Injection,           l



               21:58:                               IV, Once,           [place]



               00                                 first dose           



                                                  03/10/16           



                                                  15:58:00           



                                                  CST, stop           



                                                  date           



                                                  03/10/16           



                                                  15:58:00           



                                                  CST            

 

     propofol      -0      No   Deuce                120 mg =           [facility]

[facility]



               3-10           Wheat                12 mL,           l



               21:58:                               Emulsion,           [place]



               00                                 IV, Once,           



                                                  first dose           



                                                  03/10/16           



                                                  15:58:00           



                                                  CST, stop           



                                                  date           



                                                  03/10/16           



                                                  15:58:00           



                                                  CST            

 

     midazolam      -0      No   Deuce                0.5 mg =           [facility]

[facility]



               3-10           Wheat                0.5 mL,           l



               21:50:                               Injection,           [place]



               00                                 IV, Once,           



                                                  first dose           



                                                  03/10/16           



                                                  15:50:00           



                                                  CST, stop           



                                                  date           



                                                  03/10/16           



                                                  15:50:00           



                                                  CST            

 

     fentaNYL      -      No   Deuce                25 mcg =           [facility]

[facility]



               3-10           Wheat                0.5 mL,           l



               21:50:                               Injection,           [place]



               00                                 IV, Once,           



                                                  first dose           



                                                  03/10/16           



                                                  15:50:00           



                                                  CST, stop           



                                                  date           



                                                  03/10/16           



                                                  15:50:00           



                                                  CST            

 

     midazolam      -      No   Deuce                0.5 mg =           [facility]

[facility]



               3-10           Wheat                0.5 mL,           l



               21:50:                               Injection,           [place]



               00                                 IV, Once,           



                                                  first dose           



                                                  03/10/16           



                                                  15:50:00           



                                                  CST, stop           



                                                  date           



                                                  03/10/16           



                                                  15:50:00           



                                                  CST            

 

     fentaNYL      -      No   Deuce                25 mcg =           [facility]

[facility]



               3-10           Wheat                0.5 mL,           l



               21:50:                               Injection,           [place]



               00                                 IV, Once,           



                                                  first dose           



                                                  03/10/16           



                                                  15:50:00           



                                                  CST, stop           



                                                  date           



                                                  03/10/16           



                                                  15:50:00           



                                                  CST            

 

     midazolam      -      No   Deuce                0.5 mg =           [facility]

[facility]



               3-10           Wheat                0.5 mL,           l



               21:50:                               Injection,           [place]



               00                                 IV, Once,           



                                                  first dose           



                                                  03/10/16           



                                                  15:50:00           



                                                  CST, stop           



                                                  date           



                                                  03/10/16           



                                                  15:50:00           



                                                  CST            

 

     fentaNYL      -      No   Deuce                25 mcg =           [facility]

[facility]



               3-10           Wheat                0.5 mL,           l



               21:50:                               Injection,           [place]



               00                                 IV, Once,           



                                                  first dose           



                                                  03/10/16           



                                                  15:50:00           



                                                  CST, stop           



                                                  date           



                                                  03/10/16           



                                                  15:50:00           



                                                  CST            

 

     midazolam      -0      No   Deuce                0.5 mg =           [facility]

[facility]



               3-10           Wheat                0.5 mL,           l



               21:50:                               Injection,           [place]



               00                                 IV, Once,           



                                                  first dose           



                                                  03/10/16           



                                                  15:50:00           



                                                  CST, stop           



                                                  date           



                                                  03/10/16           



                                                  15:50:00           



                                                  CST            

 

     fentaNYL      -0      No   Deuce                25 mcg =           [facility]

[facility]



               3-10           Wheat                0.5 mL,           l



               21:50:                               Injection,           [place]



               00                                 IV, Once,           



                                                  first dose           



                                                  03/10/16           



                                                  15:50:00           



                                                  CST, stop           



                                                  date           



                                                  03/10/16           



                                                  15:50:00           



                                                  CST            

 

     midazolam      -      No   Deuce                0.5 mg =           [facility]

[facility]



               3-10           Wheat                0.5 mL,           l



               21:50:                               Injection,           [place]



               00                                 IV, Once,           



                                                  first dose           



                                                  03/10/16           



                                                  15:50:00           



                                                  CST, stop           



                                                  date           



                                                  03/10/16           



                                                  15:50:00           



                                                  CST            

 

     fentaNYL            No   Deuce                25 mcg =           [facility]

[facility]



               3-10           Wheat                0.5 mL,           l



               21:50:                               Injection,           [place]



               00                                 IV, Once,           



                                                  first dose           



                                                  03/10/16           



                                                  15:50:00           



                                                  CST, stop           



                                                  date           



                                                  03/10/16           



                                                  15:50:00           



                                                  CST            

 

     midazolam      -      No   Deuce                0.5 mg =           [facility]

[facility]



               3-10           Wheat                0.5 mL,           l



               21:50:                               Injection,           [place]



               00                                 IV, Once,           



                                                  first dose           



                                                  03/10/16           



                                                  15:50:00           



                                                  CST, stop           



                                                  date           



                                                  03/10/16           



                                                  15:50:00           



                                                  CST            

 

     fentaNYL      -      No   Deuce                25 mcg =           [facility]

[facility]



               3-10           Wheat                0.5 mL,           l



               21:50:                               Injection,           [place]



               00                                 IV, Once,           



                                                  first dose           



                                                  03/10/16           



                                                  15:50:00           



                                                  CST, stop           



                                                  date           



                                                  03/10/16           



                                                  15:50:00           



                                                  CST            

 

     midazolam      0      No   Deuce                0.5 mg =           [facility]

[facility]



               3-10           Wheat                0.5 mL,           l



               21:50:                               Injection,           [place]



               00                                 IV, Once,           



                                                  first dose           



                                                  03/10/16           



                                                  15:50:00           



                                                  CST, stop           



                                                  date           



                                                  03/10/16           



                                                  15:50:00           



                                                  CST            

 

     fentaNYL      -      No   Deuce                25 mcg =           [facility]

[facility]



               3-10           Wheat                0.5 mL,           l



               21:50:                               Injection,           [place]



               00                                 IV, Once,           



                                                  first dose           



                                                  03/10/16           



                                                  15:50:00           



                                                  CST, stop           



                                                  date           



                                                  03/10/16           



                                                  15:50:00           



                                                  CST            

 

     midazolam      2016-0      No   Deuce                0.5 mg =           [facility]

[facility]



               3-10           Wheat                0.5 mL,           l



               21:50:                               Injection,           [place]



               00                                 IV, Once,           



                                                  first dose           



                                                  03/10/16           



                                                  15:50:00           



                                                  CST, stop           



                                                  date           



                                                  03/10/16           



                                                  15:50:00           



                                                  CST            

 

     fentaNYL      2016-0      No   Deuce                25 mcg =           [facility]

[facility]



               3-10           Wheat                0.5 mL,           l



               21:50:                               Injection,           [place]



               00                                 IV, Once,           



                                                  first dose           



                                                  03/10/16           



                                                  15:50:00           



                                                  CST, stop           



                                                  date           



                                                  03/10/16           



                                                  15:50:00           



                                                  CST            

 

     midazolam      2016-0      No   Deuce                0.5 mg =           [facility]

[facility]



               3-10           Wheat                0.5 mL,           l



               21:50:                               Injection,           [place]



               00                                 IV, Once,           



                                                  first dose           



                                                  03/10/16           



                                                  15:50:00           



                                                  CST, stop           



                                                  date           



                                                  03/10/16           



                                                  15:50:00           



                                                  CST            

 

     fentaNYL      2016-0      No   Deuce                25 mcg =           [facility]

[facility]



               3-10           Wheat                0.5 mL,           l



               21:50:                               Injection,           [place]



               00                                 IV, Once,           



                                                  first dose           



                                                  03/10/16           



                                                  15:50:00           



                                                  CST, stop           



                                                  date           



                                                  03/10/16           



                                                  15:50:00           



                                                  CST            

 

     midazolam      2016-0      No   Deuce                0.5 mg =           [facility]

[facility]



               3-10           Wheat                0.5 mL,           l



               21:50:                               Injection,           [place]



               00                                 IV, Once,           



                                                  first dose           



                                                  03/10/16           



                                                  15:50:00           



                                                  CST, stop           



                                                  date           



                                                  03/10/16           



                                                  15:50:00           



                                                  CST            

 

     fentaNYL      2016-0      No   Deuce                25 mcg =           [facility]

[facility]



               3-10           Wheat                0.5 mL,           l



               21:50:                               Injection,           [place]



               00                                 IV, Once,           



                                                  first dose           



                                                  03/10/16           



                                                  15:50:00           



                                                  CST, stop           



                                                  date           



                                                  03/10/16           



                                                  15:50:00           



                                                  CST            

 

     midazolam      -0      No   Deuce                0.5 mg =           [facility]

[facility]



               3-10           Wheat                0.5 mL,           l



               21:10:                               Injection,           [place]



               00                                 IV, Once,           



                                                  first dose           



                                                  03/10/16           



                                                  15:10:00           



                                                  CST, stop           



                                                  date           



                                                  03/10/16           



                                                  15:10:00           



                                                  CST            

 

     fentaNYL      2016-0      No   Deuce                25 mcg =           [facility]

[facility]



               3-10           Wheat                0.5 mL,           l



               21:10:                               Injection,           [place]



               00                                 IV, Once,           



                                                  first dose           



                                                  03/10/16           



                                                  15:10:00           



                                                  CST, stop           



                                                  date           



                                                  03/10/16           



                                                  15:10:00           



                                                  CST            

 

     midazolam      2016-0      No   Deuce                0.5 mg =           [facility]

[facility]



               3-10           Wheat                0.5 mL,           l



               21:10:                               Injection,           [place]



               00                                 IV, Once,           



                                                  first dose           



                                                  03/10/16           



                                                  15:10:00           



                                                  CST, stop           



                                                  date           



                                                  03/10/16           



                                                  15:10:00           



                                                  CST            

 

     fentaNYL      2016-0      No   Deuce                25 mcg =           [facility]

[facility]



               3-10           Wheat                0.5 mL,           l



               21:10:                               Injection,           [place]



               00                                 IV, Once,           



                                                  first dose           



                                                  03/10/16           



                                                  15:10:00           



                                                  CST, stop           



                                                  date           



                                                  03/10/16           



                                                  15:10:00           



                                                  CST            

 

     midazolam      2016-0      No   Deuce                0.5 mg =           [facility]

[facility]



               3-10           Wheat                0.5 mL,           l



               21:10:                               Injection,           [place]



               00                                 IV, Once,           



                                                  first dose           



                                                  03/10/16           



                                                  15:10:00           



                                                  CST, stop           



                                                  date           



                                                  03/10/16           



                                                  15:10:00           



                                                  CST            

 

     fentaNYL      2016-0      No   Deuce                25 mcg =           [facility]

[facility]



               3-10           Wheat                0.5 mL,           l



               21:10:                               Injection,           [place]



               00                                 IV, Once,           



                                                  first dose           



                                                  03/10/16           



                                                  15:10:00           



                                                  CST, stop           



                                                  date           



                                                  03/10/16           



                                                  15:10:00           



                                                  CST            

 

     midazolam      2016-0      No   Deuce                0.5 mg =           Me

[facility]



               3-10           Wheat                0.5 mL,           l



               21:10:                               Injection,           [place]



               00                                 IV, Once,           



                                                  first dose           



                                                  03/10/16           



                                                  15:10:00           



                                                  CST, stop           



                                                  date           



                                                  03/10/16           



                                                  15:10:00           



                                                  CST            

 

     fentaNYL      2016-0      No   Deuce                25 mcg =           [facility]

[facility]



               3-10           Wheat                0.5 mL,           l



               21:10:                               Injection,           [place]



               00                                 IV, Once,           



                                                  first dose           



                                                  03/10/16           



                                                  15:10:00           



                                                  CST, stop           



                                                  date           



                                                  03/10/16           



                                                  15:10:00           



                                                  CST            

 

     midazolam      2016-0      No   Deuce                0.5 mg =           Me

[facility]



               3-10           Wheat                0.5 mL,           l



               21:10:                               Injection,           [place]



               00                                 IV, Once,           



                                                  first dose           



                                                  03/10/16           



                                                  15:10:00           



                                                  CST, stop           



                                                  date           



                                                  03/10/16           



                                                  15:10:00           



                                                  CST            

 

     fentaNYL      2016-0      No   Deuce                25 mcg =           [facility]

[facility]



               3-10           Wheat                0.5 mL,           l



               21:10:                               Injection,           [place]



               00                                 IV, Once,           



                                                  first dose           



                                                  03/10/16           



                                                  15:10:00           



                                                  CST, stop           



                                                  date           



                                                  03/10/16           



                                                  15:10:00           



                                                  CST            

 

     midazolam      -0      No   Deuce                0.5 mg =           Me

[facility]



               3-10           Wheat                0.5 mL,           l



               21:10:                               Injection,           [place]



               00                                 IV, Once,           



                                                  first dose           



                                                  03/10/16           



                                                  15:10:00           



                                                  CST, stop           



                                                  date           



                                                  03/10/16           



                                                  15:10:00           



                                                  CST            

 

     fentaNYL      -0      No   Deuce                25 mcg =           [facility]

[facility]



               3-10           Wheat                0.5 mL,           l



               21:10:                               Injection,           [place]



               00                                 IV, Once,           



                                                  first dose           



                                                  03/10/16           



                                                  15:10:00           



                                                  CST, stop           



                                                  date           



                                                  03/10/16           



                                                  15:10:00           



                                                  CST            

 

     midazolam      -0      No   Deuce                0.5 mg =           Me

[facility]



               3-10           Wheat                0.5 mL,           l



               21:10:                               Injection,           [place]



               00                                 IV, Once,           



                                                  first dose           



                                                  03/10/16           



                                                  15:10:00           



                                                  CST, stop           



                                                  date           



                                                  03/10/16           



                                                  15:10:00           



                                                  CST            

 

     fentaNYL      -0      No   Deuce                25 mcg =           [facility]

[facility]



               3-10           [place]heat                0.5 mL,           l



               21:10:                               Injection,           [place]



               00                                 IV, Once,           



                                                  first dose           



                                                  03/10/16           



                                                  15:10:00           



                                                  CST, stop           



                                                  date           



                                                  03/10/16           



                                                  15:10:00           



                                                  CST            

 

     midazolam      -0      No   Deuce                0.5 mg =           Me

[facility]



               3-10           Wheat                0.5 mL,           l



               21:10:                               Injection,           [place]



               00                                 IV, Once,           



                                                  first dose           



                                                  03/10/16           



                                                  15:10:00           



                                                  CST, stop           



                                                  date           



                                                  03/10/16           



                                                  15:10:00           



                                                  CST            

 

     fentaNYL      -0      No   Deuce                25 mcg =           [facility]

[facility]



               3-10           [place]heat                0.5 mL,           l



               21:10:                               Injection,           [place]



               00                                 IV, Once,           



                                                  first dose           



                                                  03/10/16           



                                                  15:10:00           



                                                  CST, stop           



                                                  date           



                                                  03/10/16           



                                                  15:10:00           



                                                  CST            

 

     midazolam      -0      No   Deuce                0.5 mg =           Me

[facility]



               3-10           [place]heat                0.5 mL,           l



               21:10:                               Injection,           [place]



               00                                 IV, Once,           



                                                  first dose           



                                                  03/10/16           



                                                  15:10:00           



                                                  CST, stop           



                                                  date           



                                                  03/10/16           



                                                  15:10:00           



                                                  CST            

 

     fentaNYL      2016-0      No   Deuce                25 mcg =           [facility]

[facility]



               3-10           [place]                0.5 mL,           l



               21:10:                               Injection,           [place]



               00                                 IV, Once,           



                                                  first dose           



                                                  03/10/16           



                                                  15:10:00           



                                                  CST, stop           



                                                  date           



                                                  03/10/16           



                                                  15:10:00           



                                                  CST            

 

     midazolam      2016-0      No   Deuce                0.5 mg =           Me

[facility]



               3-10           [place]                0.5 mL,           l



               21:10:                               Injection,           [place]



               00                                 IV, Once,           



                                                  first dose           



                                                  03/10/16           



                                                  15:10:00           



                                                  CST, stop           



                                                  date           



                                                  03/10/16           



                                                  15:10:00           



                                                  CST            

 

     fentaNYL      -0      No   Deuce                25 mcg =           [facility]

[facility]



               3-10           [place]                0.5 mL,           l



               21:10:                               Injection,           [place]



               00                                 IV, Once,           



                                                  first dose           



                                                  03/10/16           



                                                  15:10:00           



                                                  CST, stop           



                                                  date           



                                                  03/10/16           



                                                  15:10:00           



                                                  CST            

 

     fentaNYL      2016-0      No   Deuce                25 mcg =           [facility]

[facility]



               3-10           [place]                0.5 mL,           l



               21:05:                               Injection,           [place]



               00                                 IV, Once,           



                                                  first dose           



                                                  03/10/16           



                                                  15:05:00           



                                                  CST, stop           



                                                  date           



                                                  03/10/16           



                                                  15:05:00           



                                                  CST            

 

     midazolam      2016-0      No   Deuce                0.5 mg =           Me

[facility]



               3-10           [place]                0.5 mL,           l



               21:05:                               Injection,           [place]



               00                                 IV, Once,           



                                                  first dose           



                                                  03/10/16           



                                                  15:05:00           



                                                  CST, stop           



                                                  date           



                                                  03/10/16           



                                                  15:05:00           



                                                  CST            

 

     fentaNYL      2016-0      No   Deuce                25 mcg =           [facility]

[facility]



               3-10           [place]                0.5 mL,           l



               21:05:                               Injection,           [place]



               00                                 IV, Once,           



                                                  first dose           



                                                  03/10/16           



                                                  15:05:00           



                                                  CST, stop           



                                                  date           



                                                  03/10/16           



                                                  15:05:00           



                                                  CST            

 

     midazolam      2016-0      No   Deuce                0.5 mg =           Me

[facility]



               3-10           [place]                0.5 mL,           l



               21:05:                               Injection,           [place]



               00                                 IV, Once,           



                                                  first dose           



                                                  03/10/16           



                                                  15:05:00           



                                                  CST, stop           



                                                  date           



                                                  03/10/16           



                                                  15:05:00           



                                                  CST            

 

     fentaNYL      2016-0      No   Deuce                25 mcg =           [facility]

[facility]



               3-10           [place]                0.5 mL,           l



               21:05:                               Injection,           [place]



               00                                 IV, Once,           



                                                  first dose           



                                                  03/10/16           



                                                  15:05:00           



                                                  CST, stop           



                                                  date           



                                                  03/10/16           



                                                  15:05:00           



                                                  CST            

 

     midazolam      -0      No   Deuce                0.5 mg =           Me

[facility]



               3-10           [place]                0.5 mL,           l



               21:05:                               Injection,           [place]



               00                                 IV, Once,           



                                                  first dose           



                                                  03/10/16           



                                                  15:05:00           



                                                  CST, stop           



                                                  date           



                                                  03/10/16           



                                                  15:05:00           



                                                  CST            

 

     fentaNYL      -0      No   Deuce                25 mcg =           [facility]

[facility]



               3-10           [place]                0.5 mL,           l



               21:05:                               Injection,           [place]



               00                                 IV, Once,           



                                                  first dose           



                                                  03/10/16           



                                                  15:05:00           



                                                  CST, stop           



                                                  date           



                                                  03/10/16           



                                                  15:05:00           



                                                  CST            

 

     midazolam      -0      No   Deuce                0.5 mg =           Me

[facility]



               3-10           [place]                0.5 mL,           l



               21:05:                               Injection,           [place]



               00                                 IV, Once,           



                                                  first dose           



                                                  03/10/16           



                                                  15:05:00           



                                                  CST, stop           



                                                  date           



                                                  03/10/16           



                                                  15:05:00           



                                                  CST            

 

     fentaNYL      -0      No   Deuce                25 mcg =           [facility]

[facility]



               3-10           [place]                0.5 mL,           l



               21:05:                               Injection,           [place]



               00                                 IV, Once,           



                                                  first dose           



                                                  03/10/16           



                                                  15:05:00           



                                                  CST, stop           



                                                  date           



                                                  03/10/16           



                                                  15:05:00           



                                                  CST            

 

     midazolam      -0      No   Deuce                0.5 mg =           Me

[facility]



               3-10           [place]                0.5 mL,           l



               21:05:                               Injection,           [place]



               00                                 IV, Once,           



                                                  first dose           



                                                  03/10/16           



                                                  15:05:00           



                                                  CST, stop           



                                                  date           



                                                  03/10/16           



                                                  15:05:00           



                                                  CST            

 

     fentaNYL      -0      No   Deuce                25 mcg =           [facility]

[facility]



               3-10           [place]                0.5 mL,           l



               21:05:                               Injection,           [place]



               00                                 IV, Once,           



                                                  first dose           



                                                  03/10/16           



                                                  15:05:00           



                                                  CST, stop           



                                                  date           



                                                  03/10/16           



                                                  15:05:00           



                                                  CST            

 

     midazolam      -0      No   Deuce                0.5 mg =           Me

[facility]



               3-10           [place]                0.5 mL,           l



               21:05:                               Injection,           [place]



               00                                 IV, Once,           



                                                  first dose           



                                                  03/10/16           



                                                  15:05:00           



                                                  CST, stop           



                                                  date           



                                                  03/10/16           



                                                  15:05:00           



                                                  CST            

 

     fentaNYL      -0      No   Deuce                25 mcg =           [facility]

[facility]



               3-10           [place]                0.5 mL,           l



               21:05:                               Injection,           [place]



               00                                 IV, Once,           



                                                  first dose           



                                                  03/10/16           



                                                  15:05:00           



                                                  CST, stop           



                                                  date           



                                                  03/10/16           



                                                  15:05:00           



                                                  CST            

 

     midazolam      -0      No   Deuce                0.5 mg =           Me

moria



               3-10           Wheat                0.5 mL,           l



               21:05:                               Injection,           [place]



               00                                 IV, Once,           



                                                  first dose           



                                                  03/10/16           



                                                  15:05:00           



                                                  CST, stop           



                                                  date           



                                                  03/10/16           



                                                  15:05:00           



                                                  CST            

 

     fentaNYL      -0      No   Deuce                25 mcg =           [facility]

[facility]



               3-10           Wheat                0.5 mL,           l



               21:05:                               Injection,           [place]



               00                                 IV, Once,           



                                                  first dose           



                                                  03/10/16           



                                                  15:05:00           



                                                  CST, stop           



                                                  date           



                                                  03/10/16           



                                                  15:05:00           



                                                  CST            

 

     midazolam      -      No   Deuce                0.5 mg =           Me

[facility]ia



               3-10           Wheat                0.5 mL,           l



               21:05:                               Injection,           [place]



               00                                 IV, Once,           



                                                  first dose           



                                                  03/10/16           



                                                  15:05:00           



                                                  CST, stop           



                                                  date           



                                                  03/10/16           



                                                  15:05:00           



                                                  CST            

 

     fentaNYL      -      No   Deuce                25 mcg =           [facility]

[facility]



               3-10           Wheat                0.5 mL,           l



               21:05:                               Injection,           [place]



               00                                 IV, Once,           



                                                  first dose           



                                                  03/10/16           



                                                  15:05:00           



                                                  CST, stop           



                                                  date           



                                                  03/10/16           



                                                  15:05:00           



                                                  CST            

 

     midazolam            No   Deuce                0.5 mg =           Me

[facility]ia



               3-10           Wheat                0.5 mL,           l



               21:05:                               Injection,           [place]



               00                                 IV, Once,           



                                                  first dose           



                                                  03/10/16           



                                                  15:05:00           



                                                  CST, stop           



                                                  date           



                                                  03/10/16           



                                                  15:05:00           



                                                  CST            

 

     fentaNYL      -0      No   Deuce                25 mcg =           [facility]

[facility]



               3-10           Wheat                0.5 mL,           l



               21:05:                               Injection,           [place]



               00                                 IV, Once,           



                                                  first dose           



                                                  03/10/16           



                                                  15:05:00           



                                                  CST, stop           



                                                  date           



                                                  03/10/16           



                                                  15:05:00           



                                                  CST            

 

     midazolam            No   Deuce                0.5 mg =           Me

[facility]ia



               3-10           Wheat                0.5 mL,           l



               21:05:                               Injection,           [place]



               00                                 IV, Once,           



                                                  first dose           



                                                  03/10/16           



                                                  15:05:00           



                                                  CST, stop           



                                                  date           



                                                  03/10/16           



                                                  15:05:00           



                                                  CST            

 

     clindamycin      2016-0      No   Yoan                900 mg, IV        

   [facility]



               3-10           Lei                Piggyback,           l



               20:00:                               Once,           [place]



               00                                 infuse           



                                                  over 30           



                                                  minutes,           



                                                  first dose           



                                                  03/10/16           



                                                  14:00:00           



                                                  CST, stop           



                                                  date           



                                                  03/10/16           



                                                  14:00:00           



                                                  CST,           



                                                  Prophylaxi           



                                                  s              

 

     clindamycin      2016-0      No   Yoan                900 mg, IV        

   [facility]



               3-10           Lei                Piggyback,           l



               20:00:                               Once,           [place]



               00                                 infuse           



                                                  over 30           



                                                  minutes,           



                                                  first dose           



                                                  03/10/16           



                                                  14:00:00           



                                                  CST, stop           



                                                  date           



                                                  03/10/16           



                                                  14:00:00           



                                                  CST,           



                                                  Prophylaxi           



                                                  s              

 

     clindamycin      2016-0      No   Yoan                900 mg, IV        

   [facility]



               3-10           Lei                Piggyback,           l



               20:00:                               Once,           [place]



               00                                 infuse           



                                                  over 30           



                                                  minutes,           



                                                  first dose           



                                                  03/10/16           



                                                  14:00:00           



                                                  CST, stop           



                                                  date           



                                                  03/10/16           



                                                  14:00:00           



                                                  CST,           



                                                  Prophylaxi           



                                                  s              

 

     clindamycin      -0      No   Yoan                900 mg, IV        

   [facility]



               3-10           Lei                Piggyback,           l



               20:00:                               Once,           [place]



               00                                 infuse           



                                                  over 30           



                                                  minutes,           



                                                  first dose           



                                                  03/10/16           



                                                  14:00:00           



                                                  CST, stop           



                                                  date           



                                                  03/10/16           



                                                  14:00:00           



                                                  CST,           



                                                  Prophylaxi           



                                                  s              

 

     clindamycin      2016-0      No   Yoan                900 mg, IV        

   [facility]



               3-10           Lei                Piggyback,           l



               20:00:                               Once,           [place]



               00                                 infuse           



                                                  over 30           



                                                  minutes,           



                                                  first dose           



                                                  03/10/16           



                                                  14:00:00           



                                                  CST, stop           



                                                  date           



                                                  03/10/16           



                                                  14:00:00           



                                                  CST,           



                                                  Prophylaxi           



                                                  s              

 

     clindamycin      -0      No   Yoan                900 mg, IV        

   [facility]



               3-10           Lei                Piggyback,           l



               20:00:                               Once,           [place]



               00                                 infuse           



                                                  over 30           



                                                  minutes,           



                                                  first dose           



                                                  03/10/16           



                                                  14:00:00           



                                                  CST, stop           



                                                  date           



                                                  03/10/16           



                                                  14:00:00           



                                                  CST,           



                                                  Prophylaxi           



                                                  s              

 

     clindamycin      2016-0      No   Yoan                900 mg, IV        

   [facility]



               3-10           Lei                Piggyback,           l



               20:00:                               Once,           [place]



               00                                 infuse           



                                                  over 30           



                                                  minutes,           



                                                  first dose           



                                                  03/10/16           



                                                  14:00:00           



                                                  CST, stop           



                                                  date           



                                                  03/10/16           



                                                  14:00:00           



                                                  CST,           



                                                  Prophylaxi           



                                                  s              

 

     clindamycin      0      No   Yoan                900 mg, IV        

   [facility]



               3-10           Lei                Piggyback,           l



               20:00:                               Once,           [place]



               00                                 infuse           



                                                  over 30           



                                                  minutes,           



                                                  first dose           



                                                  03/10/16           



                                                  14:00:00           



                                                  CST, stop           



                                                  date           



                                                  03/10/16           



                                                  14:00:00           



                                                  CST,           



                                                  Prophylaxi           



                                                  s              

 

     clindamycin      -0      No   Yoan                900 mg, IV        

   [facility]



               3-10           Lei                Piggyback,           l



               20:00:                               Once,           [place]



               00                                 infuse           



                                                  over 30           



                                                  minutes,           



                                                  first dose           



                                                  03/10/16           



                                                  14:00:00           



                                                  CST, stop           



                                                  date           



                                                  03/10/16           



                                                  14:00:00           



                                                  CST,           



                                                  Prophylaxi           



                                                  s              

 

     clindamycin      -0      No   Yoan                900 mg, IV        

   [facility]



               3-10           Lei                Piggyback,           l



               20:00:                               Once,           [place]



               00                                 infuse           



                                                  over 30           



                                                  minutes,           



                                                  first dose           



                                                  03/10/16           



                                                  14:00:00           



                                                  CST, stop           



                                                  date           



                                                  03/10/16           



                                                  14:00:00           



                                                  CST,           



                                                  Prophylaxi           



                                                  s              

 

     LR 1,000 mL            No   Ghassan K                1,000 mL,          

 [facility]



               3-10           Chun                IV, 30           l



               19:27:                               mL/hr,           [place]                                 start date           



                                                  03/10/16           



                                                  13:27:00           



                                                  CST            

 

     Lidocaine      -0      No   Ghassan K                0.2 mL,           [facility]

[facility]



     2% 0.2 mL      3-10           Chun                Injection,           l



     IV Start      19:27:                               Subcutaneo           Her

hernandes



     [[facility]]      00                                 us, Once           



                                                  PRN for           



                                                  other (see           



                                                  comment),           



                                                  first dose           



                                                  03/10/16           



                                                  13:27:00           



                                                  CST            

 

     LR 1,000 mL            No   Ghassan K                1,000 mL,          

 [facility]



               3-10           Chun                IV, 30           l



               19:27:                               mL/hr,           [place]



                                                start date           



                                                  03/10/16           



                                                  13:27:00           



                                                  CST            

 

     Lidocaine      0      No   Ghassan K                0.2 mL,           [facility]

[facility]



     2% 0.2 mL      3-10           Chun                Injection,           l



     IV Start      19:27:                               Subcutaneo           Her

hernandes



     [[place]]      00                                 us, Once           



                                                  PRN for           



                                                  other (see           



                                                  comment),           



                                                  first dose           



                                                  03/10/16           



                                                  13:27:00           



                                                  CST            

 

     LR 1,000 mL            No   Ghassan K                1,000 mL,          

 [facility]



               3-10           Chun                IV, 30           l



               19:27:                               mL/hr,           [place]



                                                start date           



                                                  03/10/16           



                                                  13:27:00           



                                                  CST            

 

     Lidocaine            No   Ghassan K                0.2 mL,           Mem

[facility]



     2% 0.2 mL      3-10           Chun                Injection,           l



     IV Start      19:27:                               Subcutaneo           Her

hernandes



     [[facility]]      00                                 us, Once           



                                                  PRN for           



                                                  other (see           



                                                  comment),           



                                                  first dose           



                                                  03/10/16           



                                                  13:27:00           



                                                  CST            

 

     LR 1,000 mL            No   Ghassan K                1,000 mL,          

 [facility]



               3-10           Chun                IV, 30           l



               19:27:                               mL/hr,           [place]                                 start date           



                                                  03/10/16           



                                                  13:27:00           



                                                  CST            

 

     Lidocaine            No   Ghassan K                0.2 mL,           Mem

[facility]



     2% 0.2 mL      3-10           Chun                Injection,           l



     IV Start      19:27:                               Subcutaneo           Her

hernandes



     [[facility]]      00                                 us, Once           



                                                  PRN for           



                                                  other (see           



                                                  comment),           



                                                  first dose           



                                                  03/10/16           



                                                  13:27:00           



                                                  CST            

 

     LR 1,000 mL            No   Ghassan K                1,000 mL,          

 [facility]



               3-10           Chun                IV, 30           l



               19:27:                               mL/hr,           [place]                                 start date           



                                                  03/10/16           



                                                  13:27:00           



                                                  CST            

 

     Lidocaine            No   Ghassan K                0.2 mL,           [facility]

[facility]



     2% 0.2 mL      3-10           Chun                Injection,           l



     IV Start      19:27:                               Subcutaneo           Her

hernandes



     [[facility]]      00                                 us, Once           



                                                  PRN for           



                                                  other (see           



                                                  comment),           



                                                  first dose           



                                                  03/10/16           



                                                  13:27:00           



                                                  CST            

 

     LR 1,000 mL            No   Ghassan K                1,000 mL,          

 [facility]



               3-10           [facility]un                IV, 30           l



               19:27:                               mL/hr,           [place]                                 start date           



                                                  03/10/16           



                                                  13:27:00           



                                                  CST            

 

     Lidocaine            No   Ghassan K                0.2 mL,           [facility]

[facility]



     2% 0.2 mL      3-10           [facility]                Injection,           l



     IV Start      19:27:                               Subcutaneo           Her

hernandes



     [[facility]]      00                                 us, Once           



                                                  PRN for           



                                                  other (see           



                                                  comment),           



                                                  first dose           



                                                  03/10/16           



                                                  13:27:00           



                                                  CST            

 

     LR 1,000 mL            No   Ghassan K                1,000 mL,          

 [facility]



               3-10           [facility]                IV, 30           l



               19:27:                               mL/hr,           [place]                                 start date           



                                                  03/10/16           



                                                  13:27:00           



                                                  CST            

 

     Lidocaine            No   Ghassan K                0.2 mL,           [facility]

[facility]



     2% 0.2 mL      3-10           [facility]                Injection,           l



     IV Start      19:27:                               Subcutaneo           Her

hernandes



     [[facility]]      00                                 us, Once           



                                                  PRN for           



                                                  other (see           



                                                  comment),           



                                                  first dose           



                                                  03/10/16           



                                                  13:27:00           



                                                  CST            

 

     LR 1,000 mL            No   Ghassan K                1,000 mL,          

 [facility]



               3-10           [facility]                IV, 30           l



               19:27:                               mL/hr,           [place]



                                                start date           



                                                  03/10/16           



                                                  13:27:00           



                                                  CST            

 

     Lidocaine            No   Ghassan K                0.2 mL,           [facility]

[facility]



     2% 0.2 mL      3-10           [facility]                Injection,           l



     IV Start      19:27:                               Subcutaneo           Her

hernandes



     [[facility]]      00                                 us, Once           



                                                  PRN for           



                                                  other (see           



                                                  comment),           



                                                  first dose           



                                                  03/10/16           



                                                  13:27:00           



                                                  CST            

 

     LR 1,000 mL            No   Ghassan K                1,000 mL,          

 [facility]



               3-10           [facility]                IV, 30           l



               19:27:                               mL/hr,           [place]                                 start date           



                                                  03/10/16           



                                                  13:27:00           



                                                  CST            

 

     Lidocaine            No   Ghassan K                0.2 mL,           [facility]

[facility]



     2% 0.2 mL      3-10           Chun                Injection,           l



     IV Start      19:27:                               Subcutaneo           Her

hernandes



     [[facility]]      00                                 us, Once           



                                                  PRN for           



                                                  other (see           



                                                  comment),           



                                                  first dose           



                                                  03/10/16           



                                                  13:27:00           



                                                  CST            

 

     LR 1,000 mL            No   Ghassan K                1,000 mL,          

 [facility]



               3-10           [facility]                IV, 30           l



               19:27:                               mL/hr,           [place]                                 start date           



                                                  03/10/16           



                                                  13:27:00           



                                                  CST            

 

     Lidocaine            No   Ghassan K                0.2 mL,           [facility]

[facility]



     2% 0.2 mL      3-10           [facility]                Injection,           l



     IV Start      19:27:                               Subcutaneo           Her

hernandes



     [[facility]]      00                                 us, Once           



                                                  PRN for           



                                                  other (see           



                                                  comment),           



                                                  first dose           



                                                  03/10/16           



                                                  13:27:00           



                                                  CST            

 

     Seroquel            Yes                      100 mg,           [facility]

a



               3-09                               Oral,           l



               14:40:                               Daily, 0           [place]



               00                                 Refill(s),           



                                                  migraines           

 

     acetaminoph            Yes                      1 tabs,           [facility]

[facility]



     en-HYDROcod      3-09                               Oral,           l



     one 325      14:40:                               q6hr, 0           [place]



     mg-5 mg      00                                 Refill(s),           



     oral tablet                                         pain           

 

     traMADol 50            Yes                      50 mg = 1           [facility]

[facility]



     mg oral      3-09                               tabs,           l



     tablet      14:40:                               Oral,           [place]



               00                                 q4hr, 0           



                                                  Refill(s),           



                                                  pain           

 

     amLODIPine-            Yes                      1 tabs,           [facility]

[facility]



     atorvastati      3-09                               Oral, qHS,           l



     n 5 mg-40      14:40:                               0              [place]



     mg oral      00                                 Refill(s),           



     tablet                                         cholestero           



                                                  l/hyperten           



                                                  alexx           

 

     Osteo            Yes                      Oral,           [facility]



     Bi-Flex      3-                               Daily, 0           l



               14:40:                               Refill(s),           [place]



               00                                 supplement           

 

     Nature's            Yes                      1,000 mg =           [facility]

[facility]



     Bounty Red      3-09                               2 caps,           l



     Krill Oil      14:40:                               Oral, BID,           [facility]

[facility]



     500 mg oral      00                                 0              



     capsule                                         Refill(s),           



                                                  supplement           

 

     aspirin 81            Yes                      81 mg = 1           [facility]

[facility]



     mg oral      3-09                               tabs,           l



     tablet      14:40:                               Oral,           [place]



               00                                 Daily, 0           



                                                  Refill(s),           



                                                  supplement           

 

     Axert 12.5            Yes                      12.5 mg =           [facility]

[facility]



     mg oral      3-09                               1 tabs,           l



     tablet      14:40:                               Oral,           [place]



               00                                 Once, PRN           



                                                  for            



                                                  migraine           



                                                  headache,           



                                                  may repeat           



                                                  dose once           



                                                  in 2           



                                                  hours, # 6           



                                                  tabs, 0           



                                                  Refill(s),           



                                                  migrainesm           



                                                  ay repeat           



                                                  dose once           



                                                  in 2 hours           

 

     Wellbutrin            Yes                      300 mg,           [place]

[facility]an



     XL        3-                               Oral,           l



               14:40:                               q24hr, 0           [place]



               00                                 Refill(s),           



                                                  migraines           

 

     Seroquel            Yes                      100 mg,           [facility]

a



               3-09                               Oral,           l



               14:40:                               Daily, 0           [place]



               00                                 Refill(s),           



                                                  migraines           

 

     acetaminoph            Yes                      1 tabs,           [facility]

[facility]



     en-HYDROcod      3-                               Oral,           l



     one 325      14:40:                               q6hr, 0           [place]



     mg-5 mg      00                                 Refill(s),           



     oral tablet                                         pain           

 

     traMADol 50            Yes                      50 mg = 1           [facility]

[facility]



     mg oral      3-09                               tabs,           l



     tablet      14:40:                               Oral,           [place]



               00                                 q4hr, 0           



                                                  Refill(s),           



                                                  pain           

 

     amLODIPine-            Yes                      1 tabs,           [facility]

[facility]



     atorvastati      3-09                               Oral, qHS,           l



     n 5 mg-40      14:40:                               0              [place]



     mg oral      00                                 Refill(s),           



     tablet                                         cholestero           



                                                  l/hyperten           



                                                  alexx           

 

     Osteo            Yes                      Oral,           [facility]



     Bi-Flex      3-09                               Daily, 0           l



               14:40:                               Refill(s),           [place]



               00                                 supplement           

 

     Nature's            Yes                      1,000 mg =           [facility]

[facility]



     Bounty Red      3-09                               2 caps,           l



     Krill Oil      14:40:                               Oral, BID,           [facility]

[facility]



     500 mg oral      00                                 0              



     capsule                                         Refill(s),           



                                                  supplement           

 

     aspirin 81            Yes                      81 mg = 1           [facility]

[facility]



     mg oral      3-                               tabs,           l



     tablet      14:40:                               Oral,           [place]



               00                                 Daily, 0           



                                                  Refill(s),           



                                                  supplement           

 

     Axert 12.5            Yes                      12.5 mg =           [facility]

[facility]



     mg oral      3-09                               1 tabs,           l



     tablet      14:40:                               Oral,           [place]



               00                                 Once, PRN           



                                                  for            



                                                  migraine           



                                                  headache,           



                                                  may repeat           



                                                  dose once           



                                                  in 2           



                                                  hours, # 6           



                                                  tabs, 0           



                                                  Refill(s),           



                                                  migrainesm           



                                                  ay repeat           



                                                  dose once           



                                                  in 2 hours           

 

     Wellbutrin            Yes                      300 mg,           [place]

[facility]



     XL        3-09                               Oral,           l



               14:40:                               q24hr, 0           [place]



               00                                 Refill(s),           



                                                  migraines           

 

     Seroquel            Yes                      100 mg,           [facility]

a



               3-09                               Oral,           l



               14:40:                               Daily, 0           [place]



               00                                 Refill(s),           



                                                  migraines           

 

     acetaminoph            Yes                      1 tabs,           [facility]

[facility]



     en-HYDROcod      3-                               Oral,           l



     one 325      14:40:                               q6hr, 0           [place]



     mg-5 mg      00                                 Refill(s),           



     oral tablet                                         pain           

 

     traMADol 50            Yes                      50 mg = 1           [facility]

[facility]



     mg oral      3-09                               tabs,           l



     tablet      14:40:                               Oral,           [place]



               00                                 q4hr, 0           



                                                  Refill(s),           



                                                  pain           

 

     amLODIPine-            Yes                      1 tabs,           [facility]

[facility]



     atorvastati      3-09                               Oral, qHS,           l



     n 5 mg-40      14:40:                               0              [place]



     mg oral      00                                 Refill(s),           



     tablet                                         cholestero           



                                                  l/hyperten           



                                                  alexx           

 

     Osteo            Yes                      Oral,           [facility]



     Bi-Flex      3-                               Daily, 0           l



               14:40:                               Refill(s),           [place]



               00                                 supplement           

 

     Nature's            Yes                      1,000 mg =           [facility]

[facility]



     Bounty Red      3-                               2 caps,           l



     Krill Oil      14:40:                               Oral, BID,           [facility]

[facility]



     500 mg oral      00                                 0              



     capsule                                         Refill(s),           



                                                  supplement           

 

     aspirin 81            Yes                      81 mg = 1           [facility]

[facility]



     mg oral      3-                               tabs,           l



     tablet      14:40:                               Oral,           [place]



               00                                 Daily, 0           



                                                  Refill(s),           



                                                  supplement           

 

     Axert 12.5            Yes                      12.5 mg =           [facility]

[facility]



     mg oral      3-09                               1 tabs,           l



     tablet      14:40:                               Oral,           [place]



               00                                 Once, PRN           



                                                  for            



                                                  migraine           



                                                  headache,           



                                                  may repeat           



                                                  dose once           



                                                  in 2           



                                                  hours, # 6           



                                                  tabs, 0           



                                                  Refill(s),           



                                                  migrainesm           



                                                  ay repeat           



                                                  dose once           



                                                  in 2 hours           

 

     Wellbutrin            Yes                      300 mg,           [place]

[facility]



     XL        3-                               Oral,           l



               14:40:                               q24hr, 0           [place]



               00                                 Refill(s),           



                                                  migraines           

 

     Seroquel            Yes                      100 mg,           [facility]

a



               3-                               Oral,           l



               14:40:                               Daily, 0           [place]



               00                                 Refill(s),           



                                                  migraines           

 

     acetaminoph            Yes                      1 tabs,           [facility]

[facility]



     en-HYDROcod      3-                               Oral,           l



     one 325      14:40:                               q6hr, 0           [place]



     mg-5 mg      00                                 Refill(s),           



     oral tablet                                         pain           

 

     traMADol 50            Yes                      50 mg = 1           [facility]

[facility]



     mg oral      3-                               tabs,           l



     tablet      14:40:                               Oral,           [place]



               00                                 q4hr, 0           



                                                  Refill(s),           



                                                  pain           

 

     amLODIPine-      2016      Yes                      1 tabs,           [facility]

[facility]



     atorvastati      3-09                               Oral, qHS,           l



     n 5 mg-40      14:40:                               0              [place]



     mg oral      00                                 Refill(s),           



     tablet                                         cholestero           



                                                  l/hyperten           



                                                  alexx           

 

     Osteo      -      Yes                      Oral,           [facility]



     Bi-Flex      3-09                               Daily, 0           l



               14:40:                               Refill(s),           [place]



               00                                 supplement           

 

     Nature's            Yes                      1,000 mg =           [facility]

[facility]



     Bounty Red      3-                               2 caps,           l



     Krill Oil      14:40:                               Oral, BID,           [facility]

[facility]



     500 mg oral      00                                 0              



     capsule                                         Refill(s),           



                                                  supplement           

 

     aspirin 81            Yes                      81 mg = 1           [facility]

[facility]



     mg oral      3-09                               tabs,           l



     tablet      14:40:                               Oral,           [place]



               00                                 Daily, 0           



                                                  Refill(s),           



                                                  supplement           

 

     Axert 12.5            Yes                      12.5 mg =           [facility]

[facility]



     mg oral      3-09                               1 tabs,           l



     tablet      14:40:                               Oral,           [place]



               00                                 Once, PRN           



                                                  for            



                                                  migraine           



                                                  headache,           



                                                  may repeat           



                                                  dose once           



                                                  in 2           



                                                  hours, # 6           



                                                  tabs, 0           



                                                  Refill(s),           



                                                  migrainesm           



                                                  ay repeat           



                                                  dose once           



                                                  in 2 hours           

 

     Wellbutrin            Yes                      300 mg,           [facility]

[facility]



     XL        3-09                               Oral,           l



               14:40:                               q24hr, 0           [facility]



               00                                 Refill(s),           



                                                  migraines           

 

     Seroquel            Yes                      100 mg,           [facility]

[facility]



               3-                               Oral,           l



               14:40:                               Daily, 0           [place]



               00                                 Refill(s),           



                                                  migraines           

 

     acetaminoph            Yes                      1 tabs,           [facility]

[facility]



     en-HYDROcod      3-                               Oral,           l



     one 325      14:40:                               q6hr, 0           [place]



     mg-5 mg      00                                 Refill(s),           



     oral tablet                                         pain           

 

     traMADol 50            Yes                      50 mg = 1           [facility]

[facility]



     mg oral      3-09                               tabs,           l



     tablet      14:40:                               Oral,           [facility]



               00                                 q4hr, 0           



                                                  Refill(s),           



                                                  pain           

 

     amLODIPine-            Yes                      1 tabs,           [facility]

[facility]



     atorvastati      3-09                               Oral, qHS,           l



     n 5 mg-40      14:40:                               0              [facility]



     mg oral      00                                 Refill(s),           



     tablet                                         cholestero           



                                                  l/hyperten           



                                                  alexx           

 

     Osteo      -      Yes                      Oral,           [facility]



     Bi-Flex      3-09                               Daily, 0           l



               14:40:                               Refill(s),           [place]



               00                                 supplement           

 

     Nature's            Yes                      1,000 mg =           [facility]

[facility]



     Bounty Red      3-                               2 caps,           l



     Krill Oil      14:40:                               Oral, BID,           [facility]

[facility]



     500 mg oral      00                                 0              



     capsule                                         Refill(s),           



                                                  supplement           

 

     aspirin 81            Yes                      81 mg = 1           [facility]

[facility]



     mg oral      3-09                               tabs,           l



     tablet      14:40:                               Oral,           [facility]



               00                                 Daily, 0           



                                                  Refill(s),           



                                                  supplement           

 

     Axert 12.5            Yes                      12.5 mg =           [facility]

[facility]



     mg oral      3-09                               1 tabs,           l



     tablet      14:40:                               Oral,           [place]



               00                                 Once, PRN           



                                                  for            



                                                  migraine           



                                                  headache,           



                                                  may repeat           



                                                  dose once           



                                                  in 2           



                                                  hours, # 6           



                                                  tabs, 0           



                                                  Refill(s),           



                                                  migrainesm           



                                                  ay repeat           



                                                  dose once           



                                                  in 2 hours           

 

     Wellbutrin      2016      Yes                      300 mg,           [facility]

[facility]



     XL        3-                               Oral,           l



               14:40:                               q24hr, 0           [facility]



               00                                 Refill(s),           



                                                  migraines           

 

     Seroquel      2016      Yes                      100 mg,           [facility]

[facility]



               3-                               Oral,           l



               14:40:                               Daily, 0           [facility]



               00                                 Refill(s),           



                                                  migraines           

 

     acetaminoph      2016      Yes                      1 tabs,           [facility]

[facility]



     en-HYDROcod      3-                               Oral,           l



     one 325      14:40:                               q6hr, 0           [place]



     mg-5 mg      00                                 Refill(s),           



     oral tablet                                         pain           

 

     traMADol 50            Yes                      50 mg = 1           [facility]

[facility]



     mg oral      3-                               tabs,           l



     tablet      14:40:                               Oral,           [place]



               00                                 q4hr, 0           



                                                  Refill(s),           



                                                  pain           

 

     amLODIPine-            Yes                      1 tabs,           [facility]

[facility]



     atorvastati      3-                               Oral, qHS,           l



     n 5 mg-40      14:40:                               0              [facility]



     mg oral      00                                 Refill(s),           



     tablet                                         cholestero           



                                                  l/hyperten           



                                                  alexx           

 

     Osteo            Yes                      Oral,           [facility]



     Bi-Flex      3-09                               Daily, 0           l



               14:40:                               Refill(s),           [place]



               00                                 supplement           

 

     Nature's            Yes                      1,000 mg =           [facility]

[facility]



     Bounty Red      3-09                               2 caps,           l



     Krill Oil      14:40:                               Oral, BID,           [facility]

[facility]



     500 mg oral      00                                 0              



     capsule                                         Refill(s),           



                                                  supplement           

 

     aspirin 81            Yes                      81 mg = 1           [facility]

[facility]



     mg oral      3-09                               tabs,           l



     tablet      14:40:                               Oral,           [place]



               00                                 Daily, 0           



                                                  Refill(s),           



                                                  supplement           

 

     Axert 12.5      2016      Yes                      12.5 mg =           [facility]

[facility]



     mg oral      3-09                               1 tabs,           l



     tablet      14:40:                               Oral,           [facility]



               00                                 Once, PRN           



                                                  for            



                                                  migraine           



                                                  headache,           



                                                  may repeat           



                                                  dose once           



                                                  in 2           



                                                  hours, # 6           



                                                  tabs, 0           



                                                  Refill(s),           



                                                  migrainesm           



                                                  ay repeat           



                                                  dose once           



                                                  in 2 hours           

 

     Wellbutrin            Yes                      300 mg,           [facility]

[facility]



     XL        3-09                               Oral,           l



               14:40:                               q24hr, 0           [place]



               00                                 Refill(s),           



                                                  migraines           

 

     Seroquel            Yes                      100 mg,           [facility]

[facility]



               3-                               Oral,           l



               14:40:                               Daily, 0           [place]



               00                                 Refill(s),           



                                                  migraines           

 

     acetaminoph            Yes                      1 tabs,           [facility]

[facility]



     en-HYDROcod      3-                               Oral,           l



     one 325      14:40:                               q6hr, 0           [place]



     mg-5 mg      00                                 Refill(s),           



     oral tablet                                         pain           

 

     traMADol 50            Yes                      50 mg = 1           [facility]

[facility]



     mg oral      3-09                               tabs,           l



     tablet      14:40:                               Oral,           [place]



               00                                 q4hr, 0           



                                                  Refill(s),           



                                                  pain           

 

     amLODIPine-            Yes                      1 tabs,           [facility]

[facility]



     atorvastati      3-                               Oral, qHS,           l



     n 5 mg-40      14:40:                               0              [place]



     mg oral      00                                 Refill(s),           



     tablet                                         cholestero           



                                                  l/hyperten           



                                                  alexx           

 

     Osteo            Yes                      Oral,           [facility]



     Bi-Flex      3-09                               Daily, 0           l



               14:40:                               Refill(s),           [facility]



               00                                 supplement           

 

     Nature's            Yes                      1,000 mg =           [facility]

[facility]



     Bounty Red      3-09                               2 caps,           l



     Krill Oil      14:40:                               Oral, BID,           [facility]

[facility]



     500 mg oral      00                                 0              



     capsule                                         Refill(s),           



                                                  supplement           

 

     aspirin 81            Yes                      81 mg = 1           [facility]

[facility]



     mg oral      3-09                               tabs,           l



     tablet      14:40:                               Oral,           [facility]



               00                                 Daily, 0           



                                                  Refill(s),           



                                                  supplement           

 

     Axert 12.5      -      Yes                      12.5 mg =           [facility]

[facility]



     mg oral      3-09                               1 tabs,           l



     tablet      14:40:                               Oral,           [place]



               00                                 Once, PRN           



                                                  for            



                                                  migraine           



                                                  headache,           



                                                  may repeat           



                                                  dose once           



                                                  in 2           



                                                  hours, # 6           



                                                  tabs, 0           



                                                  Refill(s),           



                                                  migrainesm           



                                                  ay repeat           



                                                  dose once           



                                                  in 2 hours           

 

     Wellbutrin            Yes                      300 mg,           [facility]

[facility]



     XL        3-09                               Oral,           l



               14:40:                               q24hr, 0           [place]



               00                                 Refill(s),           



                                                  migraines           

 

     Seroquel            Yes                      100 mg,           [facility]

[facility]



               3-09                               Oral,           l



               14:40:                               Daily, 0           [facility]



               00                                 Refill(s),           



                                                  migraines           

 

     acetaminoph            Yes                      1 tabs,           [facility]

[facility]



     en-HYDROcod      3-09                               Oral,           l



     one 325      14:40:                               q6hr, 0           [facility]



     mg-5 mg      00                                 Refill(s),           



     oral tablet                                         pain           

 

     traMADol 50            Yes                      50 mg = 1           [facility]

[facility]



     mg oral      3-09                               tabs,           l



     tablet      14:40:                               Oral,           [facility]



               00                                 q4hr, 0           



                                                  Refill(s),           



                                                  pain           

 

     amLODIPine-            Yes                      1 tabs,           [facility]

[facility]



     atorvastati      3-                               Oral, qHS,           l



     n 5 mg-40      14:40:                               0              [facility]



     mg oral      00                                 Refill(s),           



     tablet                                         cholestero           



                                                  l/hyperten           



                                                  alexx           

 

     Osteo            Yes                      Oral,           [facility]



     Bi-Flex      3-09                               Daily, 0           l



               14:40:                               Refill(s),           [facility]



               00                                 supplement           

 

     Nature's            Yes                      1,000 mg =           [facility]

[facility]



     Bounty Red      3-09                               2 caps,           l



     Krill Oil      14:40:                               Oral, BID,           [facility]

[facility]



     500 mg oral      00                                 0              



     capsule                                         Refill(s),           



                                                  supplement           

 

     aspirin 81            Yes                      81 mg = 1           [facility]

[facility]



     mg oral      3-                               tabs,           l



     tablet      14:40:                               Oral,           [place]



               00                                 Daily, 0           



                                                  Refill(s),           



                                                  supplement           

 

     Axert 12.5            Yes                      12.5 mg =           [facility]

[facility]



     mg oral      3-09                               1 tabs,           l



     tablet      14:40:                               Oral,           [facility]



               00                                 Once, PRN           



                                                  for            



                                                  migraine           



                                                  headache,           



                                                  may repeat           



                                                  dose once           



                                                  in 2           



                                                  hours, # 6           



                                                  tabs, 0           



                                                  Refill(s),           



                                                  migrainesm           



                                                  ay repeat           



                                                  dose once           



                                                  in 2 hours           

 

     Wellbutrin            Yes                      300 mg,           [facility]

[facility]



     XL        3-09                               Oral,           l



               14:40:                               q24hr, 0           [place]



               00                                 Refill(s),           



                                                  migraines           

 

     Seroquel            Yes                      100 mg,           [facility]

[facility]



               3-09                               Oral,           l



               14:40:                               Daily, 0           [place]



               00                                 Refill(s),           



                                                  migraines           

 

     acetaminoph            Yes                      1 tabs,           [facility]

[facility]



     en-HYDROcod      3-09                               Oral,           l



     one 325      14:40:                               q6hr, 0           [facility]



     mg-5 mg      00                                 Refill(s),           



     oral tablet                                         pain           

 

     traMADol 50            Yes                      50 mg = 1           [facility]

[facility]



     mg oral      3-09                               tabs,           l



     tablet      14:40:                               Oral,           [place]



               00                                 q4hr, 0           



                                                  Refill(s),           



                                                  pain           

 

     amLODIPine-            Yes                      1 tabs,           [facility]

[facility]



     atorvastati      3                               Oral, qHS,           l



     n 5 mg-40      14:40:                               0              [facility]



     mg oral      00                                 Refill(s),           



     tablet                                         cholestero           



                                                  l/hyperten           



                                                  alexx           

 

     Osteo            Yes                      Oral,           [facility]



     Bi-Flex      3-09                               Daily, 0           l



               14:40:                               Refill(s),           [place]



               00                                 supplement           

 

     Nature's            Yes                      1,000 mg =           [facility]

[facility]



     Bounty Red      3-09                               2 caps,           l



     Krill Oil      14:40:                               Oral, BID,           [facility]

[facility]



     500 mg oral      00                                 0              



     capsule                                         Refill(s),           



                                                  supplement           

 

     aspirin 81            Yes                      81 mg = 1           [facility]

[facility]



     mg oral      3-                               tabs,           l



     tablet      14:40:                               Oral,           [place]



               00                                 Daily, 0           



                                                  Refill(s),           



                                                  supplement           

 

     Axert 12.5            Yes                      12.5 mg =           [facility]

[facility]



     mg oral      3-09                               1 tabs,           l



     tablet      14:40:                               Oral,           [place]



               00                                 Once, PRN           



                                                  for            



                                                  migraine           



                                                  headache,           



                                                  may repeat           



                                                  dose once           



                                                  in 2           



                                                  hours, # 6           



                                                  tabs, 0           



                                                  Refill(s),           



                                                  migrainesm           



                                                  ay repeat           



                                                  dose once           



                                                  in 2 hours           

 

     Wellbutrin            Yes                      300 mg,           [facility]

[facility]



     XL        3-09                               Oral,           l



               14:40:                               q24hr, 0           [facility]



               00                                 Refill(s),           



                                                  migraines           

 

     Seroquel            Yes                      100 mg,           [facility]

a



               3-09                               Oral,           l



               14:40:                               Daily, 0           [facility]



               00                                 Refill(s),           



                                                  migraines           

 

     acetaminoph            Yes                      1 tabs,           [facility]

[facility]



     en-HYDROcod      3-                               Oral,           l



     one 325      14:40:                               q6hr, 0           [facility]



     mg-5 mg      00                                 Refill(s),           



     oral tablet                                         pain           

 

     traMADol 50            Yes                      50 mg = 1           [facility]

[facility]



     mg oral      3-09                               tabs,           l



     tablet      14:40:                               Oral,           [facility]



               00                                 q4hr, 0           



                                                  Refill(s),           



                                                  pain           

 

     amLODIPine-            Yes                      1 tabs,           [facility]

[facility]



     atorvastati      3-09                               Oral, qHS,           l



     n 5 mg-40      14:40:                               0              [facility]



     mg oral      00                                 Refill(s),           



     tablet                                         cholestero           



                                                  l/hyperten           



                                                  alexx           

 

     Osteo            Yes                      Oral,           [facility]



     Bi-Flex      3-09                               Daily, 0           l



               14:40:                               Refill(s),           [place]



               00                                 supplement           

 

     Nature's            Yes                      1,000 mg =           [facility]

[facility]



     Bounty Red      3-09                               2 caps,           l



     Krill Oil      14:40:                               Oral, BID,           [facility]

[facility]



     500 mg oral      00                                 0              



     capsule                                         Refill(s),           



                                                  supplement           

 

     aspirin 81            Yes                      81 mg = 1           [facility]

[facility]ia



     mg oral      3-09                               tabs,           l



     tablet      14:40:                               Oral,           [place]



               00                                 Daily, 0           



                                                  Refill(s),           



                                                  supplement           

 

     Axert 12.5            Yes                      12.5 mg =           [facility]

[facility]



     mg oral      3-09                               1 tabs,           l



     tablet      14:40:                               Oral,           [facility]



               00                                 Once, PRN           



                                                  for            



                                                  migraine           



                                                  headache,           



                                                  may repeat           



                                                  dose once           



                                                  in 2           



                                                  hours, # 6           



                                                  tabs, 0           



                                                  Refill(s),           



                                                  migrainesm           



                                                  ay repeat           



                                                  dose once           



                                                  in 2 hours           

 

     Wellbutrin            Yes                      300 mg,           [facility]

[facility]



     XL        3-09                               Oral,           l



               14:40:                               q24hr, 0           [facility]



               00                                 Refill(s),           



                                                  migraines           







Immunizations







           Ordered Immunization Filled Immunization Date       Status     Commen

ts   Source



           Name       Name                                        

 

           FLUCELVAX QUAD             2022 Completed             [facility]

[facility]



                                 00:00:00                         [facility]

 

           FLUCELVAX QUAD             2022 Completed             [facility]

[facility]



                                 00:00:00                         [facility]

 

           FLUCELVAX QUAD             2022 Completed             [facility]

[facility]



                                 [facility]00                         [facility]             [facility]



                                 00:00:00                         [facility] Com[facility]             [facility]



                                 00:00:00                         [facility]            2022 Completed             [facility]



                                 00:00:00                         [facility]

 

           FLUCELVAX QUAD             2021-10-05 Completed             [facility]

[facility]



                                 00:00:00                         [facility]

 

           FLUCELVAX QUAD             2021-10-05 Completed             [facility]

[facility]



                                 00:00:00                         [facility]

 

           FLUCELVAX QUAD             2021-10-05 Completed             [facility]

[facility]



                                 00:00:00                         [facility]

 

           FLUCELVAX QUAD PF            2020 Completed             [facility]

[facility]



                                 00:00:00                         [facility]

 

           FLUCELVAX QUAD PF            2020 Completed             [facility]

[facility]



                                 00:00:00                         [facility]

 

           FLUCELVAX QUAD PF            2020 Completed             [facility]

[facility]



                                 00:00:00                         [facility]

 

           Hx influenza            2019-10-23 Completed             [facility]

[facility]



           vaccine-unspecified<            00:00:00                         



           sup>1</sup>                                             

 

           Hx influenza            2019-10-23 Completed             [facility]

[facility]



           vaccine-unspecified<            00:00:00                         



           sup>1</sup>                                             

 

           Hx influenza            2019-10-23 Completed             [facility]

[facility]



           vaccine-unspecified<            00:00:00                         



           sup>1</sup>                                             

 

           Hx influenza            2019-10-23 Completed             [facility]

[facility]



           vaccine-unspecified<            00:00:00                         



           sup>1</sup>                                             

 

           Hx influenza            2019-10-23 Completed             [facility]

[facility]



           vaccine-unspecified<            00:00:00                         



           sup>1</sup>                                             

 

           Hx influenza            2019-10-23 Completed             [facility]

[facility]



           vaccine-unspecified<            00:00:00                         



           sup>1</sup>                                             

 

           Hx influenza            2019-10-23 Completed             [facility]

[facility]



           vaccine-unspecified<            00:00:00                         



           sup>1</sup>                                             

 

           Hx influenza            2019-10-23 Completed             [facility]

[facility]



           vaccine-unspecified<            00:00:00                         



           sup>1</sup>                                             

 

           Hx influenza            2019-10-23 Completed             [facility]

[facility]



           vaccine-unspecified<            00:00:00                         



           sup>1</sup>                                             

 

           FLUCELVAX QUAD PF            2019-10-23 Completed             [facility]

[facility]



                                 [facility]:00                         [facility]

 

           FLUCELVAX QUAD PF            2019-10-23 Completed             [facility]

[facility]



                                 00:00:00                         [facility]

 

           FLUCELVAX QUAD PF            2019-10-23 Completed             [facility]

[facility]



                                 00:00:00                         [facility]

 

           Hx influenza            2019-10-23 Completed             [facility]

[facility]



           vaccine-unspecified<            00:00:00                         



           sup>1</sup>                                             

 

           Tdap                  2019 Completed             [facility]



                                 [facility]00                         [facility]

 

           Tdap                  2019 Completed             [facility]



                                 00[facility]00                         [facility]

 

           Tdap                  2019 Completed             [facility]



                                 00[facility]00                         [facility]

 

           pneumococcal            2019 Completed             [facility]

[facility]



           23-valent             00:00:00                         



           vaccine<sup>3</sup>                                             

 

           pneumococcal            2019 Completed             [facility]

[facility]



           23-valent             00:00:00                         



           vaccine<sup>3</sup>                                             

 

           pneumococcal            2019 Completed             [facility]

[facility]



           23-valent             00:00:00                         



           vaccine<sup>3</sup>                                             

 

           pneumococcal            2019 Completed             [facility]

[facility]



           23-valent             00:00:00                         



           vaccine<sup>3</sup>                                             

 

           pneumococcal            2019 Completed             [facility]

[facility]



           23-valent             00:00:00                         



           vaccine<sup>3</sup>                                             

 

           pneumococcal            2019 Completed             [facility]

[facility]



           23-valent             00:00:00                         



           vaccine<sup>3</sup>                                             

 

           pneumococcal            2019 Completed             [facility]

[facility]



           23-valent             00:00:00                         



           vaccine<sup>3</sup>                                             

 

           pneumococcal            2019 Completed             [facility]

[facility]



           23-valent             00:00:00                         



           vaccine<sup>3</sup>                                             

 

           pneumococcal            2019 Completed             [facility]

[facility]



           23-valent             00:00:00                         



           vaccine<sup>3</sup>                                             

 

           pneumococcal            2019 Completed             [facility]

[facility]



           23-valent             00:00:00                         



           vaccine<sup>3</sup>                                             

 

           Hx influenza            2011-10-25 Completed             [facility]

[facility]



           vaccine-unspecified<            14:42:42                         



           sup>1</sup>                                             

 

           Hx influenza            2011-10-25 Completed             [facility]

[facility]



           vaccine-unspecified<            14:42:42                         



           sup>2</sup>                                             

 

           Hx influenza            2011-10-25 Completed             [facility]

[facility]



           vaccine-unspecified<            14:42:42                         



           sup>1</sup>                                             

 

           Hx influenza            2011-10-25 Completed             [facility]

[facility]



           vaccine-unspecified<            14:42:42                         



           sup>2</sup>                                             

 

           Hx influenza            2011-10-25 Completed             [facility]

[facility]



           vaccine-unspecified<            14:42:42                         



           sup>1</sup>                                             

 

           Hx influenza            2011-10-25 Completed             [facility]

[facility]



           vaccine-unspecified<            14:42:42                         



           sup>2</sup>                                             

 

           Hx influenza            2011-10-25 Completed             [facility]

[facility]



           vaccine-unspecified<            14:42:42                         



           sup>1</sup>                                             

 

           Hx influenza            2011-10-25 Completed             [facility]

[facility]



           vaccine-unspecified<            14:42:42                         



           sup>2</sup>                                             

 

           Hx influenza            2011-10-25 Completed             [facility]

[facility]



           vaccine-unspecified<            14:42:42                         



           sup>1</sup>                                             

 

           Hx influenza            2011-10-25 Completed             [facility]

[facility]



           vaccine-unspecified<            14:42:42                         



           sup>2</sup>                                             

 

           Hx influenza            2011-10-25 Completed             [facility]

[facility]



           vaccine-unspecified<            14:42:42                         



           sup>1</sup>                                             

 

           Hx influenza            2011-10-25 Completed             [facility]

[facility]



           vaccine-unspecified<            14:42:42                         



           sup>2</sup>                                             

 

           Hx influenza            2011-10-25 Completed             [facility]

[facility]



           vaccine-unspecified<            14:42:42                         



           sup>1</sup>                                             

 

           Hx influenza            2011-10-25 Completed             [facility]

[facility]



           vaccine-unspecified<            14:42:42                         



           sup>2</sup>                                             

 

           Hx influenza            2011-10-25 Completed             [facility]

[facility]



           vaccine-unspecified<            14:42:42                         



           sup>1</sup>                                             

 

           Hx influenza            2011-10-25 Completed             [facility]

[facility]



           vaccine-unspecified<            14:42:42                         



           sup>2</sup>                                             

 

           Hx influenza            2011-10-25 Completed             [facility]

[facility]



           vaccine-unspecified<            14:42:42                         



           sup>1</sup>                                             

 

           Hx influenza            2011-10-25 Completed             [facility]

[facility]



           vaccine-unspecified<            14:42:42                         



           sup>2</sup>                                             

 

           Hx influenza            2011-10-25 Completed             [facility]

[facility]



           vaccine-unspecified<            14:42:42                         



           sup>1</sup>                                             

 

           Hx influenza            2011-10-25 Completed             [facility]

[facility]



           vaccine-unspecified<            14:42:42                         



           sup>2</sup>                                             







Vital Signs







             Vital Name   Observation Time Observation Value Comments     Source

 

             WEIGHT       2022 11:28:00 98.2 kg                   

 

             WEIGHT       2022 07:30:00 101 kg                    

 

             HEIGHT       2022 16:00:00 170.2 cm                  

 

             WEIGHT       2022 16:00:00 98.9 kg                   

 

             WEIGHT       2022 11:28:00 98.2 kg                   

 

             WEIGHT       2022 07:30:00 101 kg                    

 

             HEIGHT       2022 16:00:00 170.2 cm                  

 

             WEIGHT       2022 16:00:00 98.9 kg                   

 

             WEIGHT       2022 11:28:00 98.2 kg                   

 

             WEIGHT       2022 07:30:00 101 kg                    

 

             HEIGHT       2022 16:00:00 170.2 cm                  

 

             WEIGHT       2022 16:00:00 98.9 kg                   

 

             Systolic blood 2022 08:05:00 133 mm[Hg]                [facility]

 

             Diastolic blood 2022 08:05:00 87 mm[Hg]                 [facility]

 

             Heart rate   2022 08:05:00 104 /min                  [facility]

 

             Body temperature 2022 08:05:00 35.56 Sherlyn                 [facility]

 

             Respiratory rate 2022 08:05:00 20 /min                   [facility]

 

             Oxygen saturation in 2022 08:05:00 98 /min                   

[facility]



             Arterial blood by                                        [facility]

nter



             Pulse oximetry                                        

 

             Body weight  2022 11:28:00 98.2 kg                   [facility]

 

             BMI          2022 11:28:00 33.91 kg/m2               [facility]

 

             Body height  2022 16:00:00 170.2 cm                  [facility]

 

             Body height  2022 16:22:00 170.2 cm                  [facility]

 

             Body weight  2022 16:22:00 97.523 kg                 [facility]

 

             BMI          2022 16:22:00 33.67 kg/m2               [facility]

 

             Systolic blood 2022 21:24:34 114 mm[Hg]                [facility]



             pressure                                            

 

             Diastolic blood 2022 21:24:34 57 mm[Hg]                 [facility]



             pressure                                            

 

             Heart rate   2022 21:24:34 87 /min                   [facility]

 

             Body temperature 2022 21:24:34 36.61 Sherlyn                 [facility]

 

             Respiratory rate 2022 21:24:34 18 /min                   [facility]

 

             Oxygen saturation in 2022 21:24:34 100 /min                  

[facility]



             Arterial blood by                                        



             Pulse oximetry                                        

 

             Body height  2022 05:00:00 170.2 cm                  [facility]

 

             Body weight  2022 05:00:00 107.2 kg                  [facility]

 

             BMI          2022 05:00:00 37.02 kg/m2               [facility]

 

             Temperature Oral (F) 2022 19:52:00 97.6 F                    

[facility]

 

             [facility]       2022 19:52:00 170.18 cm                 [facility]

 [facility]

 

             Weight       2022 19:52:00                           [facility]

 [facility]

 

             BMI Calculated 2022 19:52:00                           [facility]

al [facility]

 

             Heart Rate   2021 21:23:00                           [facility]

 [place]

 

             Systolic (mm Hg) 2021 21:23:00                           [facility]

[facility]

 

             Diastolic (mm Hg) 2021 21:23:00                           [facility]

[facility] [place]

 

             Height       2021 21:23:00 165.1 cm                  [facility]

 [place]

 

             Weight       2021 21:23:00                           [facility]

 [facility]

 

             BMI Calculated 2021 21:23:00                           [facility]

[facility]

 

             Heart Rate   2021 20:12:00                           [facility]

 [place]

 

             Heart Rate   2021 20:08:00                           [facility]

 [place]

 

             Systolic (mm Hg) 2021 20:08:00                           [facility]

rial [place]

 

             Diastolic (mm Hg) 2021 20:08:00                           [facility]

[facility] [place]

 

             Height       2021 20:08:00 165.1 cm                  [facility]

 [place]

 

             Weight       2021 20:08:00                           [facility]

 [facility]

 

             BMI Calculated 2021 20:08:00                           [facility]

[facility] [place]

 

             Systolic (mm Hg) 2020 15:59:00                           [facility]

[facility]

 

             Diastolic (mm Hg) 2020 15:59:00                           [facility]

[facility] [place]

 

             Heart Rate   2020 15:59:00                           [facility]

 [facility]

 

             Height       2020 15:59:00 165.1 cm                  [facility]

 [place]

 

             Weight       2020 15:59:00                           [facility]

 [place]

 

             BMI Calculated 2020 15:59:00                           [facility]

al [place]

 

             Systolic (mm Hg) 2020 20:42:00                           [facility]

[facility] [place]

 

             Diastolic (mm Hg) 2020 20:42:00                           [facility]

[facility] [place]

 

             Heart Rate   2020 20:42:00                           [facility]

 [place]

 

             Temperature Oral (F) 2020 20:42:00 98.2 F                    

[facility]

 

             Height       2020 20:42:00 170.18 cm                 [facility]

 [place]

 

             Weight       2020 20:42:00                           [facility]

 [place]

 

             BMI Calculated 2020 20:42:00                           [facility]

[facility] [place]

 

             Systolic (mm Hg) 2019 21:53:00                           [facility]

[facility] [place]

 

             Diastolic (mm Hg) 2019 21:53:00                           [facility]

[facility] [place]

 

             Heart Rate   2019 21:53:00                           [facility]

 [place]

 

             Temperature Oral (F) 2019 21:53:00 97.9 F                    

[facility]

 

             Height       2019 21:53:00 165.1 cm                  [facility]

 [place]

 

             Weight       2019 21:53:00                           [facility]

 [place]

 

             BMI Calculated 2019 21:53:00                           [facility]

[facility] [place]

 

             Height       2019-10-25 14:54:00 165.1 cm                  [facility]

 [place]

 

             Weight       2019-10-25 14:54:00                           [facility]

 [place]

 

             BMI Calculated 2019-10-25 14:54:00                           [facility]

[facility] [place]

 

             Systolic (mm Hg) 2019-10-25 14:54:00                           [facility]

[facility] [place]

 

             Diastolic (mm Hg) 2019-10-25 14:54:00                           [facility]

[facility] [place]

 

             Heart Rate   2019-10-25 14:54:00                           [facility]

 [place]

 

             Temperature Oral (F) 2019-10-25 14:54:00 97.6 F                    

[facility] [place]

 

             Systolic (mm Hg) 2019-10-10 15:37:00                           [facility]

rial [place]

 

             Diastolic (mm Hg) 2019-10-10 15:37:00                           [facility]

[facility] [place]

 

             Heart Rate   2019-10-10 15:37:00                           [facility]

 [place]

 

             Temperature Oral (F) 2019-10-10 15:37:00 98.2 F                    

[facility] [place]

 

             Height       2019-10-10 15:37:00 165.1 cm                  [facility]

 [place]

 

             Weight       2019-10-10 15:37:00                           [facility]

 [place]

 

             BMI Calculated 2019-10-10 15:37:00                           [facility]

[facility] [place]

 

             Weight       2019 15:18:00                           [facility]

 [place]

 

             BMI Calculated 2019 15:18:00                           [facility]

[facility] [place]

 

             Height       2019 15:18:00 165.1 cm                  [facility]

 [place]

 

             Temperature Oral (F) 2019 15:18:00 98.4 F                    

[facility]

 

             Heart Rate   2019 15:18:00                           [facility]

 [place]

 

             Systolic (mm Hg) 2019 15:18:00                           [facility]

[facility] [place]

 

             Diastolic (mm Hg) 2019 15:18:00                           [facility]

[facility] [place]

 

             Height       2019 19:16:00 165.1 cm                  [facility]

 [place]

 

             BMI Calculated 2019 19:16:00                           [facility]

[facility] [place]

 

             Weight       2019 19:16:00                           [facility]

 [place]

 

             Heart Rate   2019 19:16:00                           [facility]

 [place]

 

             Systolic (mm Hg) 2019 19:16:00                           [facility]

[facility] [place]

 

             Diastolic (mm Hg) 2019 19:16:00                           [facility]

[facility] [place]

 

             Height       2019 14:26:00 167.01 cm                 [facility]

 [place]

 

             BMI Calculated 2019 14:26:00                           [facility]

[facility] [place]

 

             Weight       2019 14:26:00                           [facility]

 [place]

 

             Heart Rate   2019 14:26:00                           [facility]

 [place]

 

             Temperature Oral (F) 2019 14:26:00 97.9 F                    

[facility] [place]

 

             Systolic (mm Hg) 2019 14:26:00                           [facility]

[facility] [place]

 

             Diastolic (mm Hg) 2019 14:26:00                           [facility]

[facility] [place]

 

             Systolic (mm Hg) 2018 18:33:00                           [facility]

rial [place]

 

             Diastolic (mm Hg) 2018 18:33:00                           [facility]

[facility] [place]

 

             Heart Rate   2018 18:33:00                           [facility]

 [place]

 

             Weight       2018 18:33:00                           [facility]

 [place]

 

             BMI Calculated 2018 18:31:00                           [facility]

al [place]

 

             Weight       2018 18:31:00                           [facility]

 [place]

 

             Systolic (mm Hg) 2018 18:31:00                           [facility]

rial [place]

 

             Diastolic (mm Hg) 2018 18:31:00                           [facility]

[facility] [place]

 

             Height       2018 18:31:00 170.18 cm                 [facility]

 [place]

 

             Temperature Oral (F) 2018 18:31:00 98.3 F                    

[facility] [place]

 

             Heart Rate   2018 18:31:00                           [facility]

 [place]

 

             Weight       2018 16:14:00                           [facility]

 [place]

 

             Height       2018 16:14:00 170.18 cm                 [facility]

 [place]

 

             BMI Calculated 2018 16:14:00                           [facility]

[facility] [place]

 

             Heart Rate   2018 16:14:00                           [facility]

 [place]

 

             Systolic (mm Hg) 2018 16:14:00                           [facility]

[facility] [place]

 

             Diastolic (mm Hg) 2018 16:14:00                           [facility]

[facility] [place]

 

             BMI Calculated 2018 15:06:00                           [facility]

[facility] [place]

 

             Height       2018 15:06:00 170.18 cm                 [facility]

 [place]

 

             Weight       2018 15:06:00                           [facility]

 [place]

 

             Systolic (mm Hg) 2018 15:06:00                           [facility]

[facility] [place]

 

             Diastolic (mm Hg) 2018 15:06:00                           [facility]

[facility] [place]

 

             Heart Rate   2018 15:06:00                           [facility]

 [place]

 

             Temperature Oral (F) 2018 15:06:00 97.9 F                    

[facility] [place]

 

             Weight       2017 16:17:00                           [facility]

 [place]

 

             Height       2017 16:17:00 170.18 cm                 [facility]

 [place]

 

             BMI Calculated 2017 16:17:00                           [facility]

[facility] [place]

 

             Respitory Rate 2016 01:25:00                           [facility]

[facility] [place]

 

             Systolic (mm Hg) 2016 00:50:00                           [facility]

[facility] [place]

 

             Respitory Rate 2016 00:50:00                           [facility]

[facility] [place]

 

             Heart Rate   2016 00:50:00                           [facility]

 [place]

 

             Systolic (mm Hg) 2016 00:40:00                           [facility]

[facility] [place]

 

             Respitory Rate 2016 00:40:00                           [facility]

[facility] [place]

 

             Heart Rate   2016 00:40:00                           [facility]

 [place]

 

             Heart Rate   2016 00:30:00                           [facility]

 [place]

 

             Systolic (mm Hg) 2016 00:30:00                           [facility]

[facility] [place]

 

             Temperature Oral (F) 2016-03-10 23:50:00 37.1 Sherlyn                  

[facility]

 

             Height       2016-03-10 19:23:00 169 cm                    [facility]

 [place]

 

             Weight       2016-03-10 19:23:00                           [facility]

 [place]

 

             Temperature Oral (F) 2016-03-10 19:23:00 36.6 Sherlyn                  

[facility]

 

             Height       2016 14:13:00 170 cm                    [facility]

 [place]

 

             Weight       2016 14:13:00                           [facility]

 [place]







Procedures







                Procedure       Date / Time     Performing Clinician Source



                                Performed                       

 

                ULTRAFILTRATION HD CRRT 2022 18:40:00 Jeanie Canada

 [facility]

 

                ECG 12-LEAD     2022 10:57:31 Unknown, 7 [facility]

 

                ECG 12-LEAD     2022 10:57:31 Unknown, 7 [facility]

 

                NM MYOCARDIAL PERFUSION 2022 10:55:00 TravisLuma  [facility]



                PET/CT (REST & STRESS)                                 Medical C

enter

 

                TREADMILL       2022 10:49:30 Unknown, 7 [facility]



                TOLERANCE(NON-NUCLEAR                                 Medical Ce

nter



                TREADMILL)                                      

 

                ECG 12-LEAD     2022 10:49:10 Unknown, 7 [facility]

 

                ECG 12-LEAD     2022 10:49:10 Unknown, 7 [facility]

 

                CBC W/PLT COUNT & AUTO 2022 03:40:00 Jerzy [facility]

 

                BASIC METABOLIC PANEL 2022 03:40:00 Jerzy [facility]

 

                MAGNESIUM       2022 03:40:00 Jerzy [facility]

 

                PHOSPHORUS      2022 03:40:00 Jerzy [facility]

 

                CBC W/PLT COUNT & AUTO 2022 03:40:00 David Godwin [facility]

 

                HEPATITIS B SURFACE 2022 08:07:00 Capo Squires  [facility]

 

                HEMODIALYSIS INPATIENT 2022 07:15:26 Bo Corado [facility]

 

                CBC W/PLT COUNT & AUTO 2022 04:40:00 Jerzy [facility]

 

                BASIC METABOLIC PANEL 2022 04:40:00 Jerzy [facility]

 

                MAGNESIUM       2022 04:40:00 Jerzy [facility]

 

                PHOSPHORUS      2022 04:40:00 Jerzy, Vinayak  [facility]

 

                HEPATITIS C ANTIBODY 2022 04:40:00 Sanket Marshall [facility]

I [facility]

 

                PERIPHERAL BLOOD SMEAR - 2022 04:40:00 Sanket Marshall [facility]



                PATHOLOGIST REVIEW                                 Medical [facility]

 

                CBC W/PLT COUNT & AUTO 2022 04:40:00 David Godwin [facility]

 

                2D ECHO W/ DOPPLER 2022 15:49:57 David Godwin [facility]



                (CW/PW/COLOR)                                   [facility]

 

                CBC W/PLT COUNT & AUTO 2022 04:30:00 David Godwin [facility]

 

                CBC W/PLT COUNT & AUTO 2022 04:30:00 Jerzy [facility]

 

                BASIC METABOLIC PANEL 2022 04:30:00 Jerzy [facility]

 

                MAGNESIUM       2022 04:30:00 Jerzy [facility]

 

                PHOSPHORUS      2022 04:30:00 Jerzy [facility]

 

                PROTEIN ELECTROPHORESIS, 2022 04:30:00 Luma Beckham  [facility]

 

                HIGH SENSITIVITY TROPONIN 2022 18:09:00 David Godwin [facility]

 

                POTASSIUM       2022 18:09:00 Jerzy [facility]

 

                XR CHEST 1 VIEW PORTABLE / 2022 15:31:00 David Godwin [facility]



                BEDSIDE                                         [facility]

 

                CBC W/PLT COUNT & AUTO 2022 15:15:00 David Godwin [facility]

 

                COMPREHENSIVE METABOLIC 2022 15:15:00 David Godwin [facility]

 

                MAGNESIUM       2022 15:15:00 David Godwin [facility]

 

                PHOSPHORUS      2022 15:15:00 Celli, [facility]

 

                HIGH SENSITIVITY TROPONIN 2022 15:15:00 Celli, [facility]

 

                B-TYPE NATRIURETIC FACTOR 2022 15:15:00 Cell, [facility]



                (BNP)                                           [facility]

 

                CBC W/PLT COUNT & AUTO 2022 15:15:00 [facility]

 

                TSH             2022 15:14:00 Celli, [facility]

 

                ECG 12-LEAD     2022 14:50:19 Unknown, [facility]

 

                ECG 12-LEAD     2022 14:50:19 Unknown, [facility]

 

                ECG 12-LEAD     2022 14:50:19 Unknown, 7 [facility]

 

                PROTHROMBIN TIME WITH INR 2022 06:57:00 Faye Mitchell   [facility]

 

                COMPREHENSIVE METABOLIC 2022 06:57:00 Faye Mitchell   [facility]



                PANEL                                           

 

                MAGNESIUM LEVEL 2022 06:57:00 Faye Mitchell Ho

spital

 

                PHOSPHORUS LEVEL 2022 06:57:00 Faye Mitchell H

ospital

 

                ESTIMATED GFR   2022 06:57:00 Faye Mitchell [facility]

spital

 

                TROPONIN T      2022 06:57:00 Faye Mitchell Ho

spital

 

                ZZCOVID-19 ANTI-SPIKE IGG 2022 06:56:00 Stephen [facility]



                ANTIBODY TITER                                  

 

                ZZCOVID-19 SEROLOGY 2022 06:56:00 [facility]



                PATIENT SURVEILLANCE                                 

 

                CBC WITH PLATELET AND 2022 06:56:00 Faye Mitchell

[facility]



                DIFFERENTIAL                                    

 

                COVID-19 QUALITATIVE 2022 04:58:00 Suresh Pineda [facility]



                RT-PCR                                          

 

                TROPONIN T      2022 03:58:00 Faye Mitchell Ho

spital

 

                ECG ED PRELIMINARY 2022 03:37:43 [facility]



                INTERPRETATION                                  

 

                XR CHEST 1 VW PORTABLE 2022 01:31:25 [facility]

 

                COMPREHENSIVE METABOLIC 2022 01:20:00 [facility]



                PANEL                                           

 

                LIPASE LEVEL    2022 01:20:00 [facility]

 

                TROPONIN T      2022 01:20:00 Faye Mitchell 

spital

 

                B NATRIURETIC PEPTIDE 2022 01:20:00 [facility]

 

                CBC WITH PLATELET AND 2022 01:20:00 [facility]



                DIFFERENTIAL                                    

 

                ESTIMATED GFR   2022 01:20:00 [facility]

 

                ECG 12-LEAD     2022 01:03:45 Faye Mitchell 

spital

 

                COMPREHENSIVE METABOLIC 2022 16:47:00 [facility]



                PANEL                           Baljinder       

 

                CBC WITH PLATELET AND 2022 16:47:00 [facility]



                DIFFERENTIAL                    Baljinder       

 

                THYROID STIMULATING 2022 16:47:00 [facility]



                HORMONE                         Baljinder       

 

                PARATHYROID HORMONE 2022 16:47:00 [facility]



                                                Baljinder       

 

                VITAMIN D 25 HYDROXY LEVEL 2022 16:47:00 [facility]



                                                Baljinder       

 

                NM BONE SCAN 3 PHASE 2022-10-05 18:11:00 [facility]



                                                Baljinder       

 

                BONE DENSITY    2022-10-05 [facility]02:48 [facility]



                                                Baljinder       

 

                BONE DENSITY PERIPHERAL 2022-10-05 14:02:48 [facility]



                                                Baljinder       

 

                POC GLUCOSE     2022 04:49:00 Martina Douglass 

spital

 

                CBC HEMOGRAM    2022 10:05:00 Sanket Keene 

spital

 

                BASIC METABOLIC PANEL 2022 10:05:[facility]

 

                ESTIMATED GFR   2022 10:05:00 Sourav Spar[facility]

spital



                                                Baljinder       

 

                XR LUMBAR SPINE 2 OR 3 VW 2022 21:10:24 Jassi, Sanket DOWLING.  [facility]

 

                XR THORACIC SPINE 2 VW 2022 21:10:08 [facility]

 

                XR CHEST 1 VW PORTABLE 2022 19:05:29 Radhain il [facility]

 

                BASIC METABOLIC PANEL 2022 09:08:00 [facility]

 

                PHOSPHORUS LEVEL 2022 09:08:00 [facility]

 

                ESTIMATED GFR   2022 09:08:00 [facility]

 

                TTE COMPLETE, WO CONTRAST, 2022 15:40:58 Yue Youngohkun

 [facility]



                W DOPPLER ([id number])                                 

 

                MRI THORACIC SPINE WO 2022 02:11:31 Sourav Sparks [facility]



                CONTRAST                        Baljinder       

 

                MRI LUMBAR SPINE WO 2022 01:29:36 [facility]



                CONTRAST                        [facility]   

 

                HEPATITIS B CORE ANTIBODY 2022 17:34:00 Francine Fishman

[facility]



                TOTAL                                           

 

                HEPATITIS B SURFACE AB, 2022 17:34:00 [facility]

[facility]



                QUANTITATIVE                                    

 

                HEPATITIS B SURFACE 2022 17:34:00 [facility]



                ANTIGEN                                         

 

                COVID-19 QUALITATIVE 2022 16:35:00 [facility]



                RT-PCR                          [facility]   

 

                COVID-19 OMICRON VARIANT 2022 16:35:00 Quinton [facility]

 [facility]



                QUALITATIVE RT-PCR                 [facility]   

 

                POC GLUCOSE     2022 16:35:00 Martina Douglass [facility]

spital

 

                POC GLUCOSE     2022 15:09:00 [facility]   

 

                CT LUMBAR SPINE WO 2022 13:41:51 [facility]



                CONTRAST                        [facility]   

 

                CT RENAL STONE PROTOCOL 2022 13:41:38 [facility]   

 

                CBC WITH PLATELET AND 2022 12:44:00 Quinton [facility]



                DIFFERENTIAL                    [facility] 2022 12:44:00 [facility]



                PANEL                           [facility]   

 

                ESTIMATED GFR   2022 12:44:00 [facility]   

 

                [place] [facility] 2022 12:28:33 [facility]



                ILL/INJURED PATIENT INIT                 [facility]   



                30-74 MIN                                       

 

                HEPATITIS B SURFACE 2022 15:33:00                 [facility]



                ANTIGEN                                         [facility]

 

                HEPATITIS B SURFACE 2022 15:33:00                 [facility]



                ANTIBODY                                        [facility]

 

                XR CHEST 1 VW PORTABLE 2022 13:25:18 Pamela Blanton    [facility] METABOLIC 2022 10:32:00 Shelby Calle

 [facility]



                PANEL                                           

 

                CBC WITH PLATELET AND 2022 10:32:00 Shelby Calle [facility]



                DIFFERENTIAL                                    

 

                ESTIMATED GFR   2022 10:32:00 Shelby Calle [facility]

 

                HEMODIALYSIS    2022 05:06:40 Lauren Rosenthal 

ospital



                                                Gabriela        

 

                HEPATITIS B CORE ANTIBODY 2022 20:59:00 Galo [facility]



                TOTAL                           Gabriela        

 

                HEPATITIS B CORE ANTIBODY 2022 20:59:00 Galo [facility]



                IGM                             Gabriela        

 

                HEPATITIS B SURFACE 2022 20:59:00 [place]



                ANTIGEN                         Gabriela        

 

                HEPATITIS B SURFACE AB, 2022 20:58:00 [facility]



                QUANTITATIVE                    Gabriela        

 

                HEMODIALYSIS    2022 15:58:20 Cristiano Rosenthalist H

ospital



                                                [facility]        

 

                TROPONIN T      2022 15:44:00 [facility]

 

                XR CHEST 1 VW PORTABLE 2022 11:41:07 [facility]

 

                TROPONIN T      2022 11:41:00 [facility]

 

                CBC WITH PLATELET AND 2022 11:41:00 [facility]



                DIFFERENTIAL                                    

 

                COMPREHENSIVE METABOLIC 2022 11:41:00 [facility]



                PANEL                                           

 

                PROTHROMBIN TIME WITH INR 2022 11:41:00 [facility]

 

                PARTIAL THROMBOPLASTIN 2022 11:41:00 [facility]



                TIME (PTT)                                      

 

                B NATRIURETIC PEPTIDE 2022 11:41:00 [facility]

 

                PROCALCITONIN   2022 11:41:00 [facility]

 

                CREATINE KINASE, TOTAL 2022 11:41:00 [facility]



                (CPK)                                           

 

                C-REACTIVE PROTEIN 2022 11:41:00 [facility]

 

                INTERLEUKIN 6   2022 11:41:00 [facility]

 

                FERRITIN LEVEL  2022 11:41:00 [facility]

 

                D-DIMER         2022 11:41:00 [facility] [facility]

 

                LDH             2022 11:41:00 [facility]

 

                FIBRINOGEN      2022 11:41:00 [facility]

 

                ESTIMATED GFR   2022 11:41:00 [facility]

 

                RESPIRATORY PATHOGEN PANEL 2022 09:45:00 [facility]



                WITH COVID-19 RT-PCR                 All         

 

                XR CHEST 1 VW   2022 08:05:46 Louann Jaime         

 

                BLOOD CULTURE, AEROBIC & 2022 07:49:00 Louann Jaime [facility]



                ANAEROBIC                       All         

 

                CBC WITH PLATELET AND 2022 07:49:00 Louann Jaime [facility]



                DIFFERENTIAL                    All         

 

                COMPREHENSIVE METABOLIC 2022 07:49:00 Louann Jaime [facility]



                PANEL                           All         

 

                TROPONIN T      2022 07:49:00 Shelby [facility]

 

                B NATRIURETIC PEPTIDE 2022 07:49:00 [facility]



                                                All         

 

                ESTIMATED GFR   2022 07:49:00 Louann Jaime         

 

                ECG ED PRELIMINARY 2022 07:26:13 Louann Jaime [facility]



                INTERPRETATION                  All         

 

                ECG 12-LEAD     2022 07:20:02 Louann Jaime 

sebastian Carrizales         

 

                CT ABDOMEN PELVIS WO 2022 23:38:27 Mahamed [facility]



                CONTRAST                                        

 

                CBC WITH PLATELET AND 2022 23:14:00 Mahamed [facility]



                DIFFERENTIAL                                    

 

                COMPREHENSIVE METABOLIC 2022 23:14:00 Ray [facility]



                PANEL                                           

 

                LIPASE LEVEL    2022 23:14:00 [facility]

 

                ESTIMATED GFR   2022 23:14:00 Ray [facility]

 

                BASIC METABOLIC PANEL 2022 22:54:00 [facility]



                                                Gabriela        

 

                ESTIMATED GFR   2022 22:54:00 [facility]        

 

                POC GLUCOSE     2022 22:04:00 Trev Neves [facility]

 

                CT ANGIOGRAM PE CHEST 2022 00:23:56 Emilia Bonilla [facility]

 

                IR VERTEBRO LUM UNI OR IVON 2022 19:25:00 Parveen, Christine [facility]

 

                ECG 12-LEAD     2022 18:05:39 Mariluz Noe [facility]

 

                BASIC METABOLIC PANEL 2022 10:16:00 [facility]

 

                CBC WITH PLATELET AND 2022 10:16:00 [facility]



                DIFFERENTIAL                                    

 

                PROTHROMBIN TIME WITH INR 2022 10:16:00 Edinson Trevxuan Vargas

[facility]

 

                ESTIMATED GFR   2022 10:16:00 Edinson [facility]

 

                TYPE AND SCREEN 2022 10:16:00 Edinson [facility]

 

                TROPONIN T      2022 02:33:00 Edinson [facility]

 

                HEMODIALYSIS    2022 01:59:31 [facility]

[facility]



                                                Gabriela        

 

                TTE COMPLETE, WO CONTRAST, 2022 00:15:00 [facility]



                W DOPPLER ([id number])                                 

 

                TROPONIN T      2022 23:10:00 Edinson [facility]

 

                TROPONIN T      2022 20:19:00 Edinson [facility]

 

                HEMODIALYSIS    2022 13:51:20 Baramado-Daca, [facility] 

ospital



                                                Gabriela        

 

                CBC WITH PLATELET AND 2022 11:10:00 [facility]



                DIFFERENTIAL                                    

 

                BASIC METABOLIC PANEL 2022 11:10:00 [facility]

 

                PARATHYROID HORMONE 2022 11:10:00 [facility]

 

                VITAMIN D 25 HYDROXY LEVEL 2022 11:10:00 [facility]

 

                DIGOXIN LEVEL   2022 11:10:00 Baranowska-Daca[facility]

ospital



                                                Gabriela        

 

                PHOSPHORUS LEVEL 2022 11:10:00 [facility] 

H[facility]



                                                Gabriela        

 

                ESTIMATED GFR   2022 11:10:00 Parveen [facility]

 

                CBC WITH PLATELET AND 2022 09:45:00 [facility]



                DIFFERENTIAL                                    

 

                BASIC METABOLIC PANEL 2022 09:45:00 Parveen [facility]

 

                PROTHROMBIN TIME WITH INR 2022 09:45:00 Parveen [facility]

 

                ESTIMATED GFR   2022 09:45:00 [facility]

 

                GASTROINTESTINAL PANEL 2022 22:47:00 [facility]

 

                XR CHEST 1 VW PORTABLE 2022 17:34:26 Parveen [facility]

 

                CBC WITH PLATELET AND 2022 [facility]:01[facility]



                DIFFERENTIAL                                    

 

                BASIC METABOLIC PANEL 2022 10:01:00 Parveen, [facility]

 

                ESTIMATED GFR   2022 10:01:00 Parveen [facility]

 

                HEMODIALYSIS    2022 14:21:31 Lauren Rosenthal 

ospital



                                                Gabriela        

 

                CBC WITH PLATELET AND 2022 10:11:00 Parveen [facility]



                DIFFERENTIAL                                    

 

                COMPREHENSIVE METABOLIC 2022 10:11:00 Parveen [facility]



                PANEL                                           

 

                ESTIMATED GFR   2022 10:11:00 Parveen [facility]

 

                MRI LUMBAR SPINE WO 2022 02:13:34 [facility]



                CONTRAST                                        

 

                POTASSIUM LEVEL 2022 18:15:00 Lauren [facility] H

ospital



                                                Gabriela        

 

                HEPATITIS B SURFACE 2022 13:46:00 Galo [facility]



                ANTIGEN                         Gabriela        

 

                HEPATITIS B SURFACE 2022 13:46:00 [facility]



                ANTIBODY                        Gabriela        

 

                TROPONIN T      2022 13:43:00 Rosalio Angulo 

spital

 

                HEMODIALYSIS    2022 13:16:08 Lauren Rosenthal 

ospital



                                                Gabriela        

 

                CBC WITH PLATELET AND 2022 11:21:00 [facility]



                DIFFERENTIAL                                    

 

                PROTHROMBIN TIME WITH INR 2022 11:21:00 Angulo [facility]

 

                COMPREHENSIVE METABOLIC 2022 11:21:00 [facility]



                PANEL                                           

 

                THYROID STIMULATING 2022 11:21:00 [facility]



                HORMONE                                         

 

                ESTIMATED GFR   2022 11:21:00 Rosalio Angulo 

spital

 

                ZZCOVID-19 ANTI-SPIKE IGG 2022 06:43:00 [facility]



                ANTIBODY TITER                                  

 

                ZZCOVID-19 SEROLOGY 2022 06:43:00 [facility]



                PATIENT SURVEILLANCE                                 

 

                TROPONIN T      2022 06:43:00 Rosalio Angulo 

spital

 

                PROCALCITONIN   2022 06:43:00 Rosalio Angulo 

spital

 

                URINE CULTURE   2022 05:18:00 Nadia Mendez 

spital

 

                COVID-19 QUALITATIVE 2022 04:41:00 [facility]



                RT-PCR                                          

 

                URINALYSIS SCREEN AND 2022 04:41:00 [facility]



                MICROSCOPY, WITH REFLEX TO                                 



                CULTURE                                         

 

                CT ABDOMEN PELVIS WO 2022 03:51:11 Vanessa [facility]



                CONTRAST                                        

 

                CT LUMBAR SPINE WO 2022 03:49:20 Nadia Mendez  [facility]



                CONTRAST                                        

 

                COMPREHENSIVE METABOLIC 2022 03:31:00 Nadia Mendez  [facility]



                PANEL                                           

 

                CBC WITH PLATELET AND 2022 03:31:00 [facility]



                DIFFERENTIAL                                    

 

                ESTIMATED GFR   2022 03:31:00 Nadia Mendez 

spital

 

                XR CHEST 1 VW   2022 03:22:49 Nadia Mendez 

spital

 

                ECG ED PRELIMINARY 2022 02:44:33 Nadia Mendez  [facility]



                INTERPRETATION                                  

 

                ECG 12-LEAD     2022 02:40:03 Rosalio Angulo 

spital

 

                HEMODIALYSIS    2022 14:28:03 Baramado-Rosy, [facility]

ospital



                                                Gabriela        

 

                BASIC METABOLIC PANEL 2022 03:17:00 BarMarcelino, [facility]



                                                Gabriela        

 

                ESTIMATED GFR   2022 03:17:00 Baramado-Rosy, [facility]        

 

                POC GLUCOSE     2022 02:29:00 Melissa Pickett [facility]



                                                R               

 

                IR VERTEBRO CERVICAL AND 2022 20:19:56 Melissa Pickett

[facility]



                THOR UNI OR IVON                 R               

 

                IR VERTEBROPLASTY EA ADDL 2022 20:19:56 Melissa Pickett

[facility]



                T OR L                          R               

 

                [place] AN ELECTIVE  2022 19:47:00 Juan [facility]



                ENDOTRACHEAL AIRWAY                                 

 

                HEMODIALYSIS    2022 20:14:38 Crist[facility]        

 

                BLOOD CULTURE, AEROBIC & 2022 20:28:00 Magy Earl

[facility]



                ANAEROBIC                                       

 

                URINE CULTURE   2022 23:56:00 [facility]

ospital



                                                Gabriela        

 

                CT RENAL STONE PROTOCOL 2022 23:39:33 Galo, 

[facility]        

 

                URINALYSIS SCREEN AND 2022 22:29:00 Galo, [facility]



                MICROSCOPY, WITH REFLEX TO                 Gabriela        



                CULTURE                                         

 

                HEMODIALYSIS    2022 15:08:13 Galo [facility] 

ospital



                                                Gabriela        

 

                HC COMPLETE BLD COUNT 2022 09:55:00 Melissa Pickett 

[facility]



                W/AUTO DIFF                     R               

 

                BASIC METABOLIC PANEL 2022 09:55:00 Solipuram[facility]



                                                R               

 

                PROTHROMBIN TIME WITH INR 2022 09:55:00 Nieves, Melissa tomas [facility]



                                                R               

 

                URIC ACID LEVEL 2022 09:55:00 Baranowska-Daca, [facility]

ospital



                                                Gabriela        

 

                DIGOXIN LEVEL   2022 09:55:00 Baranowska-Daca, [facility]

[facility]



                                                Gabriela        

 

                CORTISOL LEVEL, AM 2022 09:55:00 Baranowska-Daca, [facility]        

 

                HEMOGLOBIN A1C  2022 09:55:00 Baranowska-Daca, [facility]

[facility]        

 

                AMYLASE LEVEL   2022 09:55:00 Baranowska-Daca, [facility]

ospital



                                                Gabriela        

 

                LIPASE LEVEL    2022 09:55:00 Baranowska-Daca, [facility]

ospital



                                                Gabriela        

 

                ESTIMATED GFR   2022 09:55:00 Nieves, Melissa [facility]



                                                R               

 

                VITAMIN D 25 HYDROXY LEVEL 2022 09:33:00 Baranowska-Daca, 

[facility]        

 

                PARATHYROID HORMONE 2022 09:33:00 Baranowska-Daca, [facility]        

 

                PHOSPHORUS LEVEL 2022 09:33:00 Baranowska-Daca, [facility]        

 

                XR SHOULDER 2+ VW RIGHT 2022 04:05:25 Baranowska-Daca, [facility]        

 

                HEMODIALYSIS    2022 03:41:52 Baranowska-Daca, [facility]

[facility]        

 

                MRI LUMBAR SPINE WO 2022 15:10:25 Mnocho Bree [facility]



                CONTRAST                                        

 

                MRI THORACIC SPINE WO 2022 14:44:08 Bree Ivan [facility]



                CONTRAST                                        

 

                BASIC METABOLIC PANEL 2022 10:43:00 Baranowska-Daca, [facility]        

 

                ESTIMATED GFR   2022 10:43:00 Baranowska-Daca, [facility]

ospital



                                                Gabriela        

 

                BASIC METABOLIC PANEL 2022 22:26:00 Baramado-Rosy, [facility]



                                                Gabriela        

 

                ESTIMATED GFR   2022 22:26:00 Baramado-Samsona, [facility]

ospital



                                                Gabriela        

 

                HEMODIALYSIS    2022 14:03:25 [facility], [facility]

ospital



                                                Gabriela        

 

                HC COMPLETE BLD COUNT 2022 09:43:00 [facility]



                W/AUTO DIFF                     Tajdin          

 

                COMPREHENSIVE METABOLIC 2022 09:43:00 [facility]



                PANEL                           Tajdin          

 

                MAGNESIUM LEVEL 2022 09:43:00 [facility]          

 

                ESTIMATED GFR   2022 09:43:00 [facility]



                                                Tajdin          

 

                TROPONIN T      2022 00:24:00 Mahamed [facility]

 

                HEPATITIS B SURFACE 2022 00:24:00 [facility]



                ANTIGEN                         [facility]        

 

                HEPATITIS B SURFACE AB, 2022 00:24:00 BaramadoRosy, [facility]



                QUANTITATIVE                    Gabriela        

 

                HEMODIALYSIS    2022 22:22:37 [facility]

[facility]        

 

                THYROID STIMULATING 2022 21:32:00 [facility]



                HORMONE                                         

 

                T4, FREE        2022 21:32:00 [facility]

spital

 

                TROPONIN T      2022 21:32:00 Esther Biggsist Ho

spital

 

                COVID-19 QUALITATIVE 2022 21:22:00 Mahamed [facility]



                RT-PCR                                          

 

                CT LUMBAR SPINE WO 2022 20:55:41 Mahamed [facility]



                CONTRAST                                        

 

                CT THORACIC SPINE WO 2022 20:53:28 Mahamed [facility]



                CONTRAST                                        

 

                CT ANGIOGRAM PE CHEST 2022 20:45:21 Ray, [facility]

 

                ECG 12-LEAD     2022 19:35:11 Ray, [facility]

 

                XR THORACIC SPINE 2 VW 2022 19:17:36 Ray, [facility]

 

                HC COMPLETE BLD COUNT 2022 18:50:00 Ray, [facility]



                W/AUTO DIFF                                     

 

                COMPREHENSIVE METABOLIC 2022 18:50:00 Ray, [facility]



                PANEL                                           

 

                TROPONIN T      2022 18:50:00 Ray, [facility]

 

                ESTIMATED GFR   2022 18:50:00 Ray, [facility]

 

                LIPASE LEVEL    2022 18:50:00 Ray, [facility]

 

                ECG ED PRELIMINARY 2022 18:18:35 Ray, [facility]



                INTERPRETATION                                  

 

                XR CHEST 2 VW   2022 16:02:54 Brenna Jacobs [facility]

ospital

 

                Diabetic retinal eye 2020 06:00:00                 [facility] [place]



                exam<sup>1</sup>                                 

 

                Mammogram       2017-07-15 05:00:00                 [facility] Her hernandes

 

                Colonoscopy<sup>2</sup> 2017 06:00:00                 Hakeem

anil [place]

 

                OPEN REDUCTION INTERNAL 2016-03-10 22:13:00 Shandra Leiew   [facility]akeem

rial [place]



                FIXATION RADIUS                                 



                INTRA-ARTICULAR W/3                                 



                FRAGMENTS [id number]                                 



                (Right)<sup>1</sup>                                 

 

                Repair of       2016-03-10 06:00:00                 Doreen hernandes



                wrist<sup>3</sup>                                 

 

                skin cancer removed from 2012 00:00:00                 [facility] [place]



                arm                                             

 

                Cholecystectomy                                 [facility]

 

                Procedure<sup>2</sup>                                 [facility] H

ermann

 

                Tubal ligation                                  [facility] [place]

 

                Eye operation                                   [facility] [place]

 

                Biopsy of breast                                 [facility]

n

 

                bilateral radial                                 [facility]

n



                kerototomy                                      

 

                bilateral tubal ligation                                 [facility]

l [place]

 

                colonoscopy                                     [facility]

 

                Skin cancer of                                  [facility]



                arm<sup>4</sup>                                 







Plan of Care







             Planned Activity Planned Date Details      Comments     Source

 

             Future Scheduled 2029   DTAP/TDAP/TD VACCINES              [facility]

[facility]



             Test         00:00:00     (2 - Td or Tdap) [code              Medic

al Center



                                       = DTAP/TDAP/TD              



                                       VACCINES (2 - Td or              



                                       Tdap)]                    

 

             Future Scheduled 2029   DTAP/TDAP/TD VACCINES              [facility]

[facility]



             Test         00:00:00     (2 - Td or Tdap) [code              Medic

al Center



                                       = DTAP/TDAP/TD              



                                       VACCINES (2 - Td or              



                                       Tdap)]                    

 

             Future Scheduled 2029   DTAP/TDAP/TD VACCINES              [facility]

[facility]



             Test         00:00:00     (2 - Td or Tdap) [code              Medic

al Center



                                       = DTAP/TDAP/TD              



                                       VACCINES (2 - Td or              



                                       Tdap)]                    

 

             Future Scheduled 2029   DTAP/TDAP/TD VACCINES              [facility]

[facility]



             Test         00:00:00     (2 - Td or Tdap) [code              Medic

al Center



                                       = DTAP/TDAP/TD              



                                       VACCINES (2 - Td or              



                                       Tdap)]                    

 

             Future Scheduled 2029   DTAP/TDAP/TD VACCINES              

[facility]



             Test         00:00:00     (2 - Td or Tdap) [code              Medic

al Center



                                       = DTAP/TDAP/TD              



                                       VACCINES (2 - Td or              



                                       Tdap)]                    

 

             Future Scheduled 2029   DTAP/TDAP/TD VACCINES              

[facility]



             Test         00:00:00     (2 - Td or Tdap) [code              Medic

al Center



                                       = DTAP/TDAP/TD              



                                       VACCINES (2 - Td or              



                                       Tdap)]                    

 

             Future Scheduled 2023-01-10   COVID-19 VACCINE (#1)              [facility]



             Test         12:01:44     [code = COVID-19              



                                       VACCINE (#1)]              

 

             Future Scheduled 2023-01-10   65+ PNEUMOCOCCAL              [facility]



             Test         12:01:44     VACCINE (1 - PCV)              



                                       [code = 65+               



                                       PNEUMOCOCCAL VACCINE              



                                       (1 - PCV)]                

 

             Future Scheduled 2023-01-10   SHINGLES VACCINES (1              Met

[facility]



             Test         12:01:44     of 2) [code = SHINGLES              



                                       VACCINES (1 of 2)]              

 

             Future Scheduled 2023-01-10   COLONOSCOPY SCREENING              [facility]



             Test         12:01:44     [code = COLONOSCOPY              



                                       SCREENING]                

 

             Future Scheduled 2023-01-10   BREAST CANCER              [facility]



             Test         12:01:44     SCREENING [code =              



                                       BREAST CANCER              



                                       SCREENING]                

 

             Future Scheduled 2023-01-10   COVID-19 VACCINE (#1)              [facility]



             Test         12:01:44     [code = COVID-19              



                                       VACCINE (#1)]              

 

             Future Scheduled 2023-01-10   65+ PNEUMOCOCCAL              Methodi

[facility]



             Test         12:01:44     VACCINE (1 - PCV)              



                                       [code = 65+               



                                       PNEUMOCOCCAL VACCINE              



                                       (1 - PCV)]                

 

             Future Scheduled 2023-01-10   SHINGLES VACCINES (1              Met

[facility]



             Test         12:01:44     of 2) [code = SHINGLES              



                                       VACCINES (1 of 2)]              

 

             Future Scheduled 2023-01-10   COLONOSCOPY SCREENING              [facility]



             Test         12:01:44     [code = COLONOSCOPY              



                                       SCREENING]                

 

             Future Scheduled 2023-01-10   BREAST CANCER              [facility]



             Test         12:01:44     SCREENING [code =              



                                       BREAST CANCER              



                                       SCREENING]                

 

             Future Scheduled 2023   DEPRESSION SCREENING              [facility]



             Test         00:00:00     (12+) [code =              Medical Center



                                       DEPRESSION SCREENING              



                                       (12+)]                    

 

             Future Scheduled 2023   FALLS RISK SCREENING              [facility]



             Test         00:00:00     [code = FALLS RISK              Medical C

enter



                                       SCREENING]                

 

             Future Scheduled 2023   DEPRESSION SCREENING              [facility]



             Test         00:00:00     (12+) [code =              Medical Center



                                       DEPRESSION SCREENING              



                                       (12+)]                    

 

             Future Scheduled 2023   FALLS RISK SCREENING              [facility]



             Test         00:00:00     [code = FALLS RISK              Medical C

enter



                                       SCREENING]                

 

             Future Scheduled 2023   DEPRESSION SCREENING              [facility]



             Test         00:00:00     (12+) [code =              Medical Center



                                       DEPRESSION SCREENING              



                                       (12+)]                    

 

             Future Scheduled 2023   FALLS RISK SCREENING              [facility]



             Test         00:00:00     [code = FALLS RISK              Medical C

enter



                                       SCREENING]                

 

             Future Scheduled 2022   HEPATITIS B VACCINES              Met

[facility]



             Test         11:07:43     (1 of 3 - 3-dose              



                                       series) [code =              



                                       HEPATITIS B VACCINES              



                                       (1 of 3 - 3-dose              



                                       series)]                  

 

             Future Scheduled 2022   COVID-19 VACCINE (#1)              [facility]



             Test         11:07:43     [code = COVID-19              



                                       VACCINE (#1)]              

 

             Future Scheduled 2022   65+ PNEUMOCOCCAL              [facility]

 Hospital



             Test         11:07:43     VACCINE (1 - PCV)              



                                       [code = 65+               



                                       PNEUMOCOCCAL VACCINE              



                                       (1 - PCV)]                

 

             Future Scheduled 2022   SHINGLES VACCINES (1              Met

[facility]



             Test         11:07:43     of 2) [code = SHINGLES              



                                       VACCINES (1 of 2)]              

 

             Future Scheduled 2022   Screening for              [facility]



             Test         11:07:43     malignant neoplasm of              



                                       cervix (procedure)              



                                       [code = 730123404]              

 

             Future Scheduled 2022   COLONOSCOPY SCREENING              [facility]



             Test         11:07:43     [code = COLONOSCOPY              



                                       SCREENING]                

 

             Future Scheduled 2022   BREAST CANCER              [facility]



             Test         11:07:43     SCREENING [code =              



                                       BREAST CANCER              



                                       SCREENING]                

 

             Future Scheduled 2022   MEDICARE ANNUAL              [facility]



             Test         00:00:00     WELLNESS (YEAR 2 or              Medical 

Center



                                       FIRST YEAR if no IPPE)              



                                       [code = MEDICARE              



                                       ANNUAL WELLNESS (YEAR              



                                       2 or FIRST YEAR if no              



                                       IPPE)]                    

 

             Future Scheduled 2022   MEDICARE ANNUAL              [facility]



             Test         00:00:00     WELLNESS (YEAR 2 or              Medical 

Center



                                       FIRST YEAR if no IPPE)              



                                       [code = MEDICARE              



                                       ANNUAL WELLNESS (YEAR              



                                       2 or FIRST YEAR if no              



                                       IPPE)]                    

 

             Future Scheduled 2022   MEDICARE ANNUAL              [facility]



             Test         00:00:00     WELLNESS (YEAR 2 or              Medical 

Center



                                       FIRST YEAR if no IPPE)              



                                       [code = MEDICARE              



                                       ANNUAL WELLNESS (YEAR              



                                       2 or FIRST YEAR if no              



                                       IPPE)]                    

 

             Future Scheduled 2022   MEDICARE ANNUAL              [facility]



             Test         00:00:00     WELLNESS (YEAR 2 or              Medical 

Center



                                       FIRST YEAR if no IPPE)              



                                       [code = MEDICARE              



                                       ANNUAL WELLNESS (YEAR              



                                       2 or FIRST YEAR if no              



                                       IPPE)]                    

 

             Future Scheduled 2022   MEDICARE ANNUAL              [facility]



             Test         00:00:00     WELLNESS (YEAR 2 or              Medical 

Center



                                       FIRST YEAR if no IPPE)              



                                       [code = MEDICARE              



                                       ANNUAL WELLNESS (YEAR              



                                       2 or FIRST YEAR if no              



                                       IPPE)]                    

 

             Future Scheduled 2022   MEDICARE ANNUAL              [facility]



             Test         00:00:00     WELLNESS (YEAR 2 or              Medical 

Center



                                       FIRST YEAR if no IPPE)              



                                       [code = MEDICARE              



                                       ANNUAL WELLNESS (YEAR              



                                       2 or FIRST YEAR if no              



                                       IPPE)]                    

 

             Future Scheduled 2022   DEPRESSION SCREENING              [facility]



             Test         00:00:00     (12+) [code =              Medical Center



                                       DEPRESSION SCREENING              



                                       (12+)]                    

 

             Future Scheduled 2022   FALLS RISK SCREENING              [facility]



             Test         00:00:00     [code = FALLS RISK              Medical C

enter



                                       SCREENING]                

 

             Future Scheduled 2022   DEPRESSION SCREENING              [facility]



             Test         00:00:00     (12+) [code =              Medical Center



                                       DEPRESSION SCREENING              



                                       (12+)]                    

 

             Future Scheduled 2022   FALLS RISK SCREENING              [facility]



             Test         00:00:00     [code = FALLS RISK              Medical C

enter



                                       SCREENING]                

 

             Future Scheduled 2022   DEPRESSION SCREENING              [facility]



             Test         00:00:00     (12+) [code =              Medical Center



                                       DEPRESSION SCREENING              



                                       (12+)]                    

 

             Future Scheduled 2022   FALLS RISK SCREENING              [facility]



             Test         00:00:00     [code = FALLS RISK              Medical C

enter



                                       SCREENING]                

 

             Future Scheduled 2021   PNEUMOCOCCAL 65+ YRS              [facility]



             Test         00:00:00     (1 - PCV) [code =              Medical Ce

nter



                                       PNEUMOCOCCAL 65+ YRS              



                                       (1 - PCV)]                

 

             Future Scheduled 2021   PNEUMOCOCCAL 65+ YRS              [facility]



             Test         00:00:00     (1 - PCV) [code =              Medical Ce

nter



                                       PNEUMOCOCCAL 65+ YRS              



                                       (1 - PCV)]                

 

             Future Scheduled 2021   PNEUMOCOCCAL 65+ YRS              [facility]



             Test         00:00:00     (1 - PCV) [code =              Medical Ce

nter



                                       PNEUMOCOCCAL 65+ YRS              



                                       (1 - PCV)]                

 

             Future Scheduled 2021   PNEUMOCOCCAL 65+ YRS              [facility]



             Test         00:00:00     (1 - PCV) [code =              Medical Ce

nter



                                       PNEUMOCOCCAL 65+ YRS              



                                       (1 - PCV)]                

 

             Future Scheduled 2021   PNEUMOCOCCAL 65+ YRS              [facility]



             Test         00:00:00     (1 - PCV) [code =              Medical Ce

nter



                                       PNEUMOCOCCAL 65+ YRS              



                                       (1 - PCV)]                

 

             Future Scheduled 2021   PNEUMOCOCCAL 65+ YRS              [facility]



             Test         00:00:00     (1 - PCV) [code =              Medical Ce

nter



                                       PNEUMOCOCCAL 65+ YRS              



                                       (1 - PCV)]                

 

             Future Scheduled 2006   SHINGLES VACCINES (1              [facility]



             Test         00:00:00     of 2) [code = SHINGLES              Medic

al Center



                                       VACCINES (1 of 2)]              

 

             Future Scheduled 2006   SHINGLES VACCINES (1              [facility]



             Test         00:00:00     of 2) [code = SHINGLES              Medic

al Center



                                       VACCINES (1 of 2)]              

 

             Future Scheduled 2006   SHINGLES VACCINES (1              [facility]



             Test         00:00:00     of 2) [code = SHINGLES              Medic

al Center



                                       VACCINES (1 of 2)]              

 

             Future Scheduled 2006   SHINGLES VACCINES (1              [facility]



             Test         00:00:00     of 2) [code = SHINGLES              Medic

al Center



                                       VACCINES (1 of 2)]              

 

             Future Scheduled 2006   SHINGLES VACCINES (1              [facility]



             Test         00:00:00     of 2) [code = SHINGLES              Medic

al Center



                                       VACCINES (1 of 2)]              

 

             Future Scheduled 2006   SHINGLES VACCINES (1              [facility]



             Test         00:00:00     of 2) [code = SHINGLES              Medic

al Center



                                       VACCINES (1 of 2)]              

 

             Future Scheduled 1968   Tobacco Cessation              [facility]



             Test         00:00:00     Counseling and              Medical Cente

r



                                       Screening (12+) [code              



                                       = Tobacco Cessation              



                                       Counseling and              



                                       Screening (12+)]              

 

             Future Scheduled 1968   Tobacco Cessation              [facility]



             Test         00:00:00     Counseling and              Medical Cente

r



                                       Screening (12+) [code              



                                       = Tobacco Cessation              



                                       Counseling and              



                                       Screening (12+)]              

 

             Future Scheduled 1968   Tobacco Cessation              [facility]



             Test         00:00:00     Counseling and              Medical Cente

r



                                       Screening (12+) [code              



                                       = Tobacco Cessation              



                                       Counseling and              



                                       Screening (12+)]              

 

             Future Scheduled 1968   Tobacco Cessation              [facility]



             Test         00:00:00     Counseling and              Medical Cente

r



                                       Screening (12+) [code              



                                       = Tobacco Cessation              



                                       Counseling and              



                                       Screening (12+)]              

 

             Future Scheduled 1968   Tobacco Cessation              [facility]



             Test         00:00:00     Counseling and              Medical Cente

r



                                       Screening (12+) [code              



                                       = Tobacco Cessation              



                                       Counseling and              



                                       Screening (12+)]              

 

             Future Scheduled 1968   Tobacco Cessation              [facility]



             Test         00:00:00     Counseling and              Medical Cente

r



                                       Screening (12+) [code              



                                       = Tobacco Cessation              



                                       Counseling and              



                                       Screening (12+)]              

 

             Future Scheduled 1957   COVID-19 VACCINE (#1)              [facility]



             Test         00:00:00     [code = COVID-19              [facility]

ter



                                       VACCINE (#1)]              

 

             Future Scheduled 1957   COVID-19 VACCINE (#1)              [facility]



             Test         00:00:00     [code = COVID-19              [facility]

ter



                                       VACCINE (#1)]              

 

             Future Scheduled 1957   COVID-19 VACCINE (#1)              [facility]



             Test         00:00:00     [code = COVID-19              [facility]

ter



                                       VACCINE (#1)]              

 

             Future Scheduled 1957   COVID-19 VACCINE (#1)              [facility]



             Test         00:00:00     [code = COVID-19              [facility]

ter



                                       VACCINE (#1)]              

 

             Future Scheduled 1957   COVID-19 VACCINE (#1)              [facility]



             Test         00:00:00     [code = COVID-19              [facility]

ter



                                       VACCINE (#1)]              

 

             Future Scheduled 1957   COVID-19 VACCINE (#1)              [facility]



             Test         00:00:00     [code = COVID-19              [facility]

ter



                                       VACCINE (#1)]              

 

             Future Scheduled 1956   Screening for              [facility]

es



             Test         00:00:00     malignant neoplasm of              Medica

l Center



                                       breast (procedure)              



                                       [code = [id number]]              

 

             Future Scheduled 1956   CT Colonography              [facility]



             Test         00:00:00     (combo) [code = CT              Medical C

enter



                                       Colonography (combo)]              

 

             Future Scheduled 1956   Screening for              [facility]

es



             Test         00:00:00     malignant neoplasm of              Medica

l Center



                                       colon (procedure)              



                                       [code = [id number]]              

 

             Future Scheduled 1956   Screening for              [facility]

es



             Test         00:00:00     malignant neoplasm of              Medica

l Center



                                       colon (procedure)              



                                       [code = 939919839]              

 

             Future Scheduled 1956   DXA SCAN [code = DXA              [facility]



             Test         00:00:00     SCAN]                     [facility]

 

             Future Scheduled 1956   Screening for              [facility]

es



             Test         00:00:00     malignant neoplasm of              Medica

l Center



                                       colon (procedure)              



                                       [code = 826430112]              

 

             Future Scheduled 1956   Screening for              [facility]

es



             Test         00:00:00     malignant neoplasm of              Medica

l Center



                                       colon (procedure)              



                                       [code = 861810221]              

 

             Future Scheduled 1956   Sigmoidoscopy [code =              [facility]



             Test         00:00:00     Sigmoidoscopy]              Medical Cent[facility]

r

 

             Future Scheduled 1956   Screening for              [facility]

es



             Test         00:00:00     malignant neoplasm of              Medica

l Center



                                       breast (procedure)              



                                       [code = [id number]]              

 

             Future Scheduled 1956   CT Colonography              [facility]



             Test         00:00:00     (combo) [code = CT              Medical C

enter



                                       Colonography (combo)]              

 

             Future Scheduled 1956   Screening for              [facility]

es



             Test         00:00:00     malignant neoplasm of              Medica

l Center



                                       colon (procedure)              



                                       [code = 412593889]              

 

             Future Scheduled 1956   Screening for              [facility]

es



             Test         00:00:00     malignant neoplasm of              Med[facility]

l Center



                                       colon (procedure)              



                                       [code = 322595340]              

 

             Future Scheduled 1956   DXA SCAN [code = DXA              [facility]



             Test         00:00:00     SCAN]                     [facility]

 

             Future Scheduled 1956   Screening for              [facility]

es



             Test         00:00:00     malignant neoplasm of              Medica

l Center



                                       colon (procedure)              



                                       [code = 354410207]              

 

             Future Scheduled 1956   Screening for              [facility]

es



             Test         00:00:00     malignant neoplasm of              Med[facility]

l Center



                                       colon (procedure)              



                                       [code = 984898848]              

 

             Future Scheduled 1956   Sigmoidoscopy [code =              [facility]



             Test         00:00:00     Sigmoidoscopy]              [facility]

 

             Future Scheduled 1956   Screening for              [facility]

es



             Test         00:00:00     malignant neoplasm of              Medica

l Center



                                       breast (procedure)              



                                       [code = 647975667]              

 

             Future Scheduled 1956   CT Colonography              [facility]



             Test         00:00:00     (combo) [code = CT              Medical C

enter



                                       Colonography (combo)]              

 

             Future Scheduled 1956   Screening for              [facility]

es



             Test         00:00:00     malignant neoplasm of              [facility]



                                       colon (procedure)              



                                       [code = 751428344]              

 

             Future Scheduled 1956   Screening for              [facility]

es



             Test         00:00:00     malignant neoplasm of              [facility]



                                       colon (procedure)              



                                       [code = 272740739]              

 

             Future Scheduled 1956   DXA SCAN [code = DXA              [facility]



             Test         00:00:00     SCAN]                     [facility]

 

             Future Scheduled 1956   Screening for              [facility]

es



             Test         00:00:00     malignant neoplasm of              [facility]



                                       colon (procedure)              



                                       [code = 229272334]              

 

             Future Scheduled 1956   Screening for              [facility]

es



             Test         00:00:00     malignant neoplasm of              [facility]



                                       colon (procedure)              



                                       [code = 332952456]              

 

             Future Scheduled 1956   Sigmoidoscopy [code =              [facility]



             Test         00:00:00     Sigmoidoscopy]              [facility]

 

             Future Scheduled 1956   Screening for              [facility]

es



             Test         00:00:00     malignant neoplasm of              [facility]



                                       breast (procedure)              



                                       [code = 005969829]              

 

             Future Scheduled 1956   CT [facility]              [facility]



             Test         00:00:00     (combo) [code = CT              [facility]

enter



                                       Colonography (combo)]              

 

             Future Scheduled 1956   Screening for              [facility]

es



             Test         00:00:00     malignant neoplasm of              [facility]



                                       colon (procedure)              



                                       [code = 370117548]              

 

             Future Scheduled 1956   Screening for              [facility]

es



             Test         00:00:00     malignant neoplasm of              [facility]



                                       colon (procedure)              



                                       [code = 320595285]              

 

             Future Scheduled 1956   DXA SCAN [code = DXA              [facility]



             Test         00:00:00     SCAN]                     [facility]

 

             Future Scheduled 1956   Screening for              [facility]

es



             Test         00:00:00     malignant neoplasm of              [facility]

[facility]



                                       colon (procedure)              



                                       [code = 068198085]              

 

             Future Scheduled 1956   Screening for              [facility]

es



             Test         00:00:00     malignant neoplasm of              [facility]



                                       colon (procedure)              



                                       [code = 272236207]              

 

             Future Scheduled 1956   Sigmoidoscopy [code =              [facility]

[facility]



             Test         00:00:00     Sigmoidoscopy]              [facility]

 

             Future Scheduled 1956   Screening for              [facility]

es



             Test         00:00:00     malignant neoplasm of              [facility]

[facility]



                                       breast (procedure)              



                                       [code = 105280922]              

 

             Future Scheduled 1956   CT Colonography              [facility]



             Test         00:00:00     (combo) [code = CT              [facility]

enter



                                       Colonography (combo)]              

 

             Future Scheduled 1956   Screening for              [facility]

es



             Test         00:00:00     malignant neoplasm of              [facility]

[facility]



                                       colon (procedure)              



                                       [code = 896939367]              

 

             Future Scheduled 1956   Screening for              [facility]

es



             Test         00:00:00     malignant neoplasm of              [facility]

[facility] Center



                                       colon (procedure)              



                                       [code = 292781513]              

 

             Future Scheduled 1956   DXA SCAN [code = DXA              [facility]



             Test         00:00:00     SCAN]                     [facility]

 

             Future Scheduled 1956   Screening for              [facility]

es



             Test         00:00:00     malignant neoplasm of              [facility]

[facility] Center



                                       colon (procedure)              



                                       [code = 944215835]              

 

             Future Scheduled 1956   Screening for              [facility]

es



             Test         00:00:00     malignant neoplasm of              [facility]

[facility]



                                       colon (procedure)              



                                       [code = 298058122]              

 

             Future Scheduled 1956   Sigmoidoscopy [code =              [facility]

[facility]



             Test         00:00:00     Sigmoidoscopy]              [facility]

r

 

             Future Scheduled 1956   Screening for              [facility]

es



             Test         00:00:00     malignant neoplasm of              [facility]

[facility] Center



                                       breast (procedure)              



                                       [code = 245994743]              

 

             Future Scheduled 1956   CT [facility]ography              [facility]



             Test         00:00:00     (combo) [code = CT              Medical C

enter



                                       Colonography (combo)]              

 

             Future Scheduled 1956   Screening for              [facility]

es



             Test         00:00:00     malignant neoplasm of              [facility]

[facility]



                                       colon (procedure)              



                                       [code = 950401832]              

 

             Future Scheduled 1956   Screening for              [facility]

es



             Test         00:00:00     malignant neoplasm of              [facility]

[facility] Center



                                       colon (procedure)              



                                       [code = 963890772]              

 

             Future Scheduled 1956   DXA SCAN [code = DXA              [facility]



             Test         00:00:00     SCAN]                     [facility]

 

             Future Scheduled 1956   Screening for              [facility]

es



             Test         00:00:00     malignant neoplasm of              [facility]

[facility] Center



                                       colon (procedure)              



                                       [code = 199831338]              

 

             Future Scheduled 1956   Screening for              [facility]

es



             Test         00:00:00     malignant neoplasm of              [facility]

[facility]



                                       colon (procedure)              



                                       [code = 246923806]              

 

             Future Scheduled 1956   Sigmoidoscopy [code =              [facility]

[facility]



             Test         00:00:00     Sigmoidoscopy]              [facility]

r







Encounters







        Start   End     Encounter Admission Attending Care    Care    Encounter 

Source



        Date/Time Date/Time Type    Type    Clinicians Facility Department ID   

   

 

        2023         Inpatient RUFINA Sylvester [facility]    [id number]

[address] [place]



        11:00:00                         Kd                  [phone] [facility]

 

        2023         Inpatient RUFINA Sylvester [facility]    [id number]

[address] [place]



        09:00:00                         Kd                  [phone] [facility]

 

        2022         Inpatient RUFINA Cuellar [facility]   ENDO    [id number]

8 [facility]



        09:00:00                         [address]

 

        [facility]   [id number]

 [id number] [facility]



        14:20:00 12:50:00 Encounter         Sanket Marshall Roy                         [facility]

 

        2022 Inpatient ER      [facility]    Cardiology 20

[id number] [facility]



        14:20:00 12:50:00                 VINAYAK                             

 

        [facility]   [id number]

 [id number] [facility]



        14:20:00 12:50:00 Encounter         Sanket Marshall Roy                         [facility]

 

        2022 Orders                  [facility]   [id number]

[address] [facility]



        00:00:00 00:00:00 Only                                            [facility]

 

        2022 Orders                  [facility]   [id number]

[address] [facility]



        00:00:00 00:00:00 Only                                            [facility]

 

        2022 Outpatient                 [facility]     BC     [id number]

37 [facility]



        00:00:00 23:59:00                                                 Jessie

 

        2022 Orders                  [facility]   [id number]

[address] [facility]



        00:00:00 00:00:00 Only                                            [facility]

 

        2022 Orders                  [facility]   [id number]

[address] [facility]



        00:00:00 00:00:00 Only                                            [facility]

 

        2022 Travel                  1.2.840.1 1.2.785.893 5459

[id number] Methodi



        00:00:00 00:00:00                         12540.1.1 350.1.13.43 734     

st



                                                3.430.2.7 0.2.7.3.698         Ho

spita



                                                .3.990421 084.8           l



                                                .8                      

 

        2022-11-15 2022-11-15 Telephone         Tess, 1.2.840.1 [id number]

[id number] Methodi



        00:00:00 00:00:00                 Gregoria   82321.1.1         [address]



                                                3.430.2.7                 Hospit

a



                                                .3.594662                 l



                                                .8                      

 

        2022 Hospital         Suresh Pineda 1.2.840.1 1

[id number] 

[id number]                              Methodi



        20:01:00 18:10:00 Encounter         Zhen Jenkins 39750.1.1    

     [address]



                                        Stephen, Candismaar 3.430.2.7                 

Hospita



                                        [facility] Allyson .3.563441        

         l



                                                .8                      

 

        2022 Travel                  1.2.840.1 1.2.772.402 5203

[id number] Methodi



        00:00:00 00:00:00                         20481.1.1 350.1.13.43 679     

st



                                                3.430.2.7 0.2.7.3.698         Ho

spita



                                                .3.525034 084.8           l



                                                .8                      

 

        2022 Orders          [place],  1.2.840.1 [id number]

6850 Methodi



        00:00:00 00:00:00 Only            Sourav 91391.1.1         [address]     [address]



                                        Baljinder 3.430.2.7                 Hosp

mimi



                                                .3.726002                 l



                                                .8                      

 

        2022-10-21 2022-10-21 Office          Bridger,  1.2.840.1 [id number]

5385 Methodi



        12:00:00 12:15:00 Visit           Sourav 30178.1.1         [address]



                                        Baljinder 3.430.2.7                 Hosp

mimi



                                                .3.546792                 l



                                                .8                      

 

        2022-10-21 2022-10-21 Travel                  1.2.840.1 1.2.091.920 8991

307981 Methodi



        00:00:00 00:00:00                         93632.1.1 350.1.13.43 055     

st



                                                3.430.2.7 0.2.7.3.698         Ho

spita



                                                .3.791067 084.8           l



                                                .8                      

 

        2022-10-12 2022-10-12 Travel                  1.2.840.1 1.2.754.943 4971

165108 Methodi



        00:00:00 00:00:00                         85364.1.1 350.1.13.43 459     

st



                                                3.430.2.7 0.2.7.3.698         Ho

spita



                                                .3.605605 084.8           l



                                                .8                      

 

        2022-10-05 2022-10-05 [facility],  1.2.840.1 [id number]

[id number] Methodi



        12:10:58 23:59:00 Encounter         Sourav 80092.1.1         626     s

t



                                        Baljinder 3.430.2.7                 Hosp

mimi



                                                .3.361714                 l



                                                .8                      

 

        2022-10-05 2022-10-05 [facility],  1.2.840.1 [id number] 

[id number] Methodi



        09:06:47 12:09:00 Encounter         Sourav 30573.1.1         624     s

t



                                        Baljinder 3.430.2.7                 Hosp

mimi



                                                .3.679601                 l



                                                .8                      

 

        2022-10-05 2022-10-05 [facility]  1.2.840.1 [id number] 

95384 Methodi



        08:25:04 09:05:00 Encounter         Sourav 62119.1.1         622     s

t



                                        Baljinder 3.430.2.7                 Hosp

mimi



                                                .3.938540                 l



                                                .8                      

 

        2022-10-05 2022-10-05 Travel                  1.2.840.1 1.2.129.874 0002

[id number] Methodi



        00:00:00 00:00:00                         11908.1.1 350.1.13.43 702     

st



                                                3.430.2.7 0.2.7.3.698         Ho

spita



                                                .3.615497 084.8           l



                                                .8                      

 

        2022 Office          Vadim, 1.2.840.1 [id number]

9152 Methodi



        14:15:00 14:15:00 Visit           Daylin  03229.1.1         [address]     st



                                                3.430.2.7                 Hospit

a



                                                .3.539861                 l



                                                .8                      

 

        2022 Travel                  1.2.840.1 1.2.318.996 8092

597040 Methodi



        00:00:00 00:00:00                         87974.1.1 350.1.13.43 876     

st



                                                3.430.2.7 0.2.7.3.698         Ho

spita



                                                .3.095773 084.8           l



                                                .8                      

 

        2022 Office          [place],  1.2.840.1 [id number]

3139 Methodi



        12:45:00 13:22:26 Visit           Sourav 15282.1.1         547     st



                                        Baljinder 3.430.2.7                 Hosp

mimi



                                                .3.348539                 l



                                                .8                      

 

        2022 Travel                  1.2.840.1 1.2.986.151 6782

860111 Methodi



        00:00:00 00:00:00                         07354.1.1 350.1.13.43 541     

st



                                                3.430.2.7 0.2.7.3.698         Ho

spita



                                                .3.306292 084.8           l



                                                .8                      

 

          [facility].2[facility]840[facility] 

[id number]                 [id number]                Methodi



        07:21:00 20:54:00 Encounter         Martina Douglass 35425.1.1         90

4     st



                                                3.430.2.7                 Hospit

a



                                                .3.943774                 l



                                                .8                      

 

        2022 Prep for         Odom, 1.2.840.1 [id number]

04914 Methodi



        00:00:00 00:00:00 Surgery         Louann D 18332.1.1         190     s

t



                                                3.430.2.7                 Hospit

a



                                                .3.312986                 l



                                                .8                      

 

        2022 Travel                  1.2.840.1 1.2.877.446 9458

548715 Methodi



        00:00:00 00:00:00                         52330.1.1 350.1.13.43 602     

st



                                                3.430.2.7 0.2.7.3.698         Ho

spita



                                                .3.056482 084.8           l



                                                .8                      

 

        [id number]

562 [facility]



        04:58:00 04:58:00                 Venancio                  89      [facility]

 

        2022 Outpatient CLEMENTE Minaya   [facility]    [id number]

090 [facility]



        06:37:00 06:37:00                 Venancio                  64      [facility]

 

        2022 Lab                     [facility]   [id number]

[address] [facility]



        00:00:00 00:00:00 [facility]

 

        2022 Lab                     [facility]   [id number]

[address] [facility]



        00:00:00 00:00:00 [facility]

 

        2022 [facility]         [facility]     476

806-202 [place]



        00:00:00 00:00:00                                         [address]   [place]



                                                                        Urology

 

        2022 Emergency         Louann Jaime 1.2.840

.1 [id number] 

[id number]                              Methodi



        02:04:00 18:01:00                 Aki Dupree 67779.1.1         026     

st



                                        [facility] 3.430.2.7          

       Hospita



                                        [facility] .3.840933                 

l



                                                .8                      

 

        2022 Travel                  1.2.840.1 1.2.148.345 7549

661149 Methodi



        00:00:00 00:00:00                         85544.1.1 350.1.13.43 552     

st



                                                3.430.2.7 0.2.7.3.698         Ho

spita



                                                .3.299536 084.8           l



                                                .8                      

 

        2022 Outpatient                 [facility]

[id number] [facility]



        21:20:00 04:59:59                         r       Medicine 56      l



                                                        Rudy Hines

n

 

        2022 Outpatient                 nullFlavo [facility]

[id number] [facility]



        21:20:00 04:59:59                         r       Medicine 56      l



                                                        Rudy Hines

n

 

        2022 Outpatient         Green,  MG    [facility]    [id number]

[id number] 



        16:20:00 23:59:59                 Charisse N                 56      

 

        2022 Outpatient                 [facility]    IE    [id number]

365 [facility]



        16:20:00 16:20:00                                         56      l



                                                                        Barron

 

        2022 Emergency         Maureen,  1.2.840.1 [id number]

[id number] Methodi



        17:10:00 22:30:00                 Martha   03009.1.1         503     [facility] 3.430.2.7                 Hosp

mimi



                                                .3.220458                 l



                                                .8                      

 

        2022 Travel                  1.2.840.1 1.2.339.112 4305

985380 Methodi



        00:00:00 00:00:00                         21319.1.1 350.1.13.43 329     

st



                                                3.430.2.7 0.2.7.3.698         Ho

spita



                                                .3.473243 084.8           l



                                                .8                      

 

        2022 Hospital         Parveen Hutchinson. 1.2.840.1 104

794477 

[id number]                              Methodi



        21:20:00 13:07:00 Encounter         Rosalio Angulo 63143.1.1         968  

   



                                        ParveenChristine 3.430.2.7         

        Hospita



                                        Trev Neves .3.824330          

       l



                                                .8                      

 

        2022 Anesthesia         Hasmukh,   1.2.840.1 [id number] 

[id number] Methodi



        13:41:00 14:30:00 Event           Yarhina  27721.1.1         [address]     [address]



                                        Laura  3.430.2.7                 Hospit

a



                                                .3.956070                 l



                                                .8                      

 

        2022 Travel                  1.2.840.1 1.2.298.135 8531

724479 Methodi



        00:00:00 00:00:00                         54937.1.1 350.1.13.43 714     

st



                                                3.430.2.7 0.2.7.3.698         Ho

spita



                                                .3.154270 084.8           l



                                                .8                      

 

        2022 Outpatient Suzie Ramos [facility]   DAYS    LA0

[id number] [facility]



        07:07:00 07:07:00                                         79      [facility]

 

        2022 Outpatient Suzie Ramos HCAPM   HCAPM   F94

 [facility]



        07:07:00 07:07:00                                            [facility]

 

        2022 Phone                   [facility] 3467

[id number] Memoria



        15:25:04 04:59:59 Message                 r       Medicine 17      l



                                                        Rudy Hines

michelle

 

        2022 Phone                   [facility] 3467

[id number] Memoria



        15:25:04 04:59:59 Message                 r       Medicine 17      l



                                                        Grant         Donny martinez

 

        2022 Outpatient                 [facility]    [id number]

355 



        10:25:04 23:59:59                                         17      

 

        2022 Emergency EM      Hadley Medina HCAPM   CT    LA00

[id number] [facility]



        12:08:00 15:51:00                                         00      [facility]

 

        2022 Emergency EM      Hadley Medina HCAPM   HCAPM   F945

77 [facility]



        12:08:00 15:51:00                                            [facility]

 

        2022 Outpatient RUFINA Cuellar HCAPM   ENDO    

63071 [facility]



        07:10:00 07:10:00                 Clark                   92      [facility]

 

        2022 Ambulatory                 [facility] 3

[id number] Mem[facility]



        15:15:00 15:15:00 Pre-Reg                 r       Medicine 54      l



                                                        Rudy Hines

n

 

        2022 Ambulatory                 [facility] 3

[id number] Memoria



        15:15:00 15:15:00 Pre-Reg                 r       Medicine 54      l



                                                        Rudy Hines

n

 

        2022 Outpatient                 [facility]    [facility]    [id number]

365 [facility]



        10:15:00 10:15:00                                         54      dennis Mart

 

        2022 Outpatient         Green,  [facility]    MG    [id number]

365 



        10:15:00 10:15:00                 Charisse N                 54      

 

        2022 Outpatient                 [facility]    [facility]    [id number]

365 [facility]



        10:30:00 10:30:00                                         55      dennis



                                                                        Barron

 

        2022 Outpatient                 [facility]    IE    [id number]

365 [facility]



        10:30:00 10:30:00                                         55      dennis Mart

 

        2022 Patient         Joana Adan 1.2.840.1 [id number]

[id number] 

Methodi



        00:00:00 00:00:00 Outreach                 91576.1.1         020     st



                                                3.430.2.7                 Hospit

a



                                                .3.989284                 l



                                                .8                      

 

        2022 Office          [place], 1.2.840.1 [id number]

0540 Methodi



        10:15:00 11:21:59 Visit           Daylin  55977.1.1         779     st



                                                3.430.2.7                 Hospit

a



                                                .3.699750                 l



                                                .8                      

 

        2022 Travel                  1.2.840.1 1.2.432.264 0108

947004 Methodi



        00:00:00 00:00:00                         17281.1.1 350.1.13.43 779     

st



                                                3.430.2.7 0.2.7.3.698         Ho

spita



                                                .3.828936 084.8           l



                                                .8                      

 

        2022 Hospital         Suresh Pineda 1.2.840.1 1

[id number] 

[id number]                              Methodi



        12:16:00 16:05:00 Encounter         Melissa Pickett 14597.1.1   

      492     st



                                                3.430.2.7                 Hospit

a



                                                .3.550054                 l



                                                .8                      

 

        2022 Anesthesia         Naseem Willis 1.2.8

40.1 [id number] 

[id number]                              Methodi



        13:07:00 14:43:00 Event           Elaina Clark 21309.1.1         65

0     st



                                                3.430.2.7                 Hospit

a



                                                .3.849551                 l



                                                .8                      

 

        2022 Telephone         Vadim 1.2.840.1 [id number] 2100

285024 Methodi



        00:00:00 00:00:00                 Daylin  70354.1.1         006     st



                                                3.430.2.7                 Hospit

a



                                                .3.764874                 l



                                                .8                      

 

        2022 Phone                   [facility] 3467

[id number] Memoria



        17:07:02 05:59:59 Message                 r       Medicine 16      l



                                                        Rudy Hines

michelle

 

        2022 Phone                   [facility] 3467

[id number] Memoria



        17:07:02 05:59:59 Message                 r       Medicine 16      l



                                                        Rudy Hines

n

 

        2022 Outpatient                 [facility]    [id number]

355 



        11:07:02 23:59:59                                         16      

 

        2022 Travel                  1.2.840.1 1.2.809.853 3391

444836 Methodi



        00:00:00 00:00:00                         11834.1.1 350.1.13.43 996     

st



                                                3.430.2.7 0.2.7.3.698         Ho

spita



                                                .3.633870 084.8           l



                                                .8                      

 

        2022 Office          Reynaldo  1.2.840.1 [id number]

7744 Methodi



        16:30:00 16:34:07 Visit           Brenna 08362.1.1         169     st



                                                3.430.2.7                 Hospit

a



                                                .3.002452                 l



                                                .8                      

 

        2022 [facility],  1.2.840.1 [id number]

60441 Methodi



        09:45:00 23:59:00 Encounter         Brenna 39227.1.1         979     

st



                                                3.430.2.7                 Hospit

a



                                                .3.343276                 l



                                                .8                      

 

        2022 Travel                  1.2.840.1 1.2.412.122 1625

628175 Methodi



        00:00:00 00:00:00                         64712.1.1 350.1.13.43 961     

st



                                                3.430.2.7 0.2.7.3.698         Ho

spita



                                                .3.527157 084.8           l



                                                .8                      

 

        2022 Phone                   [facility] 3467

[id number] Memoria



        19:33:53 05:59:59 Message                 r       Medicine 15      l



                                                        Rudy Hines

michelle

 

        2022 Phone                   [facility] 3467

[id number] Memoria



        19:33:53 05:59:59 Message                 r       Medicine 15      l



                                                        Rudy Hines

michelle

 

        2022 Outpatient                 [facility]    [facility]    [id number]

355 



        13:33:53 23:59:59                                         15      

 

        2021 Phone                   [facility] 3467

[id number] Memoria



        19:38:03 05:59:59 Message                 r       Medicine 14      l



                                                        Rudy Hines

michelle

 

        2021 Phone                   [facility] 3467

[id number] Memoria



        19:38:03 05:59:59 Message                 r       Medicine 14      dennis Palacioberg         Donny martinez

 

        2021 Outpatient                 [facility]    [facility]    [id number]

355 



        13:38:03 23:59:59                                         14      

 

        2021 Outpatient                 [facility] 3

[id number] Memoria



        21:15:00 05:59:59                         r       Medicine 53      l



                                                        Rudy martinez

 

        2021 Outpatient                 [facility] 3

[id number] Memoria



        21:15:00 05:59:59                         r       Medicine 53      l



                                                        Rudy Rosarioan

n

 

        2021 Outpatient         Peter  [facility]    [id number]

365 



        15:15:00 23:59:59                 Charisse N                 53      

 

        2021 [facility]    [id number]

365 [facility]



        15:15:00 15:15:00                                         53      dennis Mart

 

        2021 Office          [facility],  1.2.840.1 [id number]

2742 Methodi



        15:30:00 16:01:04 Visit           Brenna 68501.1.1         834     st



                                                3.430.2.7                 Hospit

a



                                                .3.372145                 l



                                                .8                      

 

        2021 Travel                  1.2.840.1 1.2.114.745 6408

003343 Methodi



        00:00:00 00:00:00                         44344.1.1 350.1.13.43 575     

st



                                                3.430.2.7 0.2.7.3.698         Ho

spita



                                                .3.632548 084.8           l



                                                .8                      

 

        2021 Between                 [facility] 3467

[id number] Memoria



        14:18:24 14:18:24 Visit                   r       Medicine 62      l



                                                        Rudy martinez

 

        2021 Between                 [facility] 3467

[id number] Memoria



        14:18:24 14:18:24 Visit                   r       Medicine 62      l



                                                        Rudy Hines

n

 

        2021 Outpatient                 [facility]    [id number]

375 



        08:18:24 08:18:24                                         62      

 

        2021 Between                 [facility] 3467

[id number] Memoria



        00:56:47 00:56:47 Visit                   r       Medicine 61      dennis martinez

 

        2021 Between                 [facility] 3467

[id number] Memoria



        00:56:47 00:56:47 Visit                   r       Medicine 61      l



                                                        Rudy martinez

 

        2021 Outpatient                 [facility]    [facility]    [id number]

375 



        18:56:47 18:56:47                                         61      

 

        2021 Outpatient                 [facility] 3

[id number] Memoria



        20:00:00 05:59:59                         r       Medicine 52      l



                                                        Rudy Hines

n

 

        2021 Outpatient                 [facility] 3

[id number] Memoria



        20:00:00 05:59:59                         r       Medicine 52      l



                                                        Rudy Hines

n

 

        2021 Outpatient         Green,  [facility]    [facility]    [id number]

365 



        14:00:00 23:59:59                 Charisse N                 52      

 

        2021 Outpatient                 [facility]    [facility]    [id number]

365 Memoria



        14:00:00 14:00:00                                         52      l



                                                                        [place]

 

        2021 Phone                   [facility] 3467

[id number] Memoria



        21:38:38 05:59:59 Message                 r       Medicine 13      dennis Hines

n

 

        2021 Phone                   [facility] 3467

[id number] Memoria



        21:38:38 05:59:59 Message                 r       Medicine 13      dennis Hines

n

 

        2021 Outpatient                 [facility]    MG    [id number]

355 



        15:38:38 23:59:59                                         13      

 

        2021 Office          [place], 1.2.840.1 [id number]

2744 Methodi



        11:00:00 11:44:58 Visit           Daylin  98598.1.1         513     st



                                                3.430.2.7                 Hospit

a



                                                .3.927086                 l



                                                .8                      

 

        2021 Travel                  1.2.840.1 1.2.690.087 3884

316613 Methodi



        00:00:00 00:00:00                         65125.1.1 350.1.13.43 808     

st



                                                3.430.2.7 0.2.7.3.698         Ho

spita



                                                .3.507908 084.8           l



                                                .8                      

 

        2021-11-15 2021 Between                 [facility] 3467

[id number] Memoria



        15:33:59 15:33:59 Visit                   r       Medicine 59      l



                                                        Rudy Hines

n

 

        2021-11-15 2021 Between                 [facility] 3467

[id number] Memoria



        15:33:59 15:33:59 Visit                   r       Medicine 59      l



                                                        Rudy Hines

n

 

        2021-11-15 2021 Outpatient                 [facility]    [id number]

375 



        09:33:59 09:33:59                                         59      

 

        2021 2021-10-05 Inpatient         SOLIPURAM, [facility]     074     [id number]

[address]30 [place]



        00:00:00 00:00:00                 MELISSA                    329     Method

i



                                                                        

 

        2021 Outpatient         GC_EAH_Brow PRIV    PRIV    140

52413-3 [facility]



        00:00:00 00:00:00                 n_J                     [id number] [facility]ica

l

 

        2021 Outpatient         OPPERMANN, [facility]     2100

836491 [place]



        00:00:00 00:00:00                 JULIANNA                 104     Method

i



                                                                        

 

        2021 Outpatient         AHMED,  [facility]     [id number]

765 [place]



        00:00:00 00:00:00                 RAZIUDDIN                 273     Meth

farhana



                                                                        

 

        2021 Outpatient         EKERUO, [facility]     [id number]

411 [place]



        00:00:00 00:00:00                 DAYLIN                  787     Method

i



                                                                        

 

        2021 Outpatient         DAOURA, [facility]     [id number]

587 [place]



        00:00:00 00:00:00                 MAGY                 546     Method

i



                                                                        

 

        2021 Inpatient         MATHIVANAN, [facility]     064     2100

[id number] [place]



        00:00:00 00:00:00                 MELVIN                   275     Method

i



                                                                        

 

        2021 Outpatient         AHMED,  [facility]     [id number]

068 [place]



        00:00:00 00:00:00                 RAZIUDDIN                 199     Meth

farhana



                                                                        

 

        2021 Outpatient         EKERUO, [facility]     021     [id number]

337 [place]



        00:00:00 00:00:00                 DAYLIN                  640     Method

i



                                                                        

 

        202116 Outpatient         EKERUO, [facility]     [id number]

412 [place]



        00:00:00 00:00:00                 DAYLIN                  435     Method

i



                                                                        

 

        2021 Outpatient         EKERUO, [facility]     [id number]

412 [place]



        00:00:00 00:00:00                 DAYLIN                  537     Method

i



                                                                        

 

        2021 Outpatient         EKERUO, [facility]     [id number]

029 [place]



        00:00:00 00:00:00                 DAYLIN                  709     Method

i



                                                                        

 

        2021 Inpatient         SOLWILBURURAM, [facility]     064     [id number]

79424 [place]



        00:00:00 00:00:00                 MELISSA                    685     Method

i



                                                                        

 

        2021 Outpatient         AHMED,  [facility]     [id number]

046 [place]



        00:00:00 00:00:00                 RAZIUDDIN                 101     Meth

farhana



                                                                        

 

        2021 Outpatient                 [facility] 3467

[id number] Memoria



        01:00:00 04:59:00                         r       Barron 58      l



                                                        [place]         [place]eredith



 

        2021 Outpatient                 [facility] 3467

[id number] Memoria



        01:00:00 04:59:00                         r       Barron 58      l



                                                        [place]         Meredith



 

        2021 Outpatient         AHMED,  [facility]    PUL     7558   

 [facility]



        20:00:00 23:59:00                 RAZIUDDIN                         

 

        2021 Outpatient         Ahmed[facility]    [id number]

375 



        20:00:00 23:59:00                 Raziuddin                 58      

 

        2021 Outpatient         AHMED,  [facility]     [id number]

900 [place]



        00:00:00 00:00:00                 RAZIUDDIN                 598     Meth

farhana



                                                                        

 

        2021-04-15 2021 Inpatient         KISHOREAN, [facility]     064     2100

[id number] [place]



        00:00:00 00:00:00                 MELVIN                   060     Method

i



                                                                        

 

        2021 Outpatient                 [facility]     [id number]

422 [place]



        00:00:00 00:00:00                                         470     Method

i



                                                                        st

 

        2021 Outpatient                 [facility] 3

[id number] Memoria



        19:30:00 04:59:59                         r       Medicine 51      l



                                                        Grant         Donny

n

 

        2021 Outpatient                 [facility] 3

[id number] Memoria



        19:30:00 04:59:59                         r       Medicine 51      l



                                                        Rudy Hines

n

 

        2021 Outpatient         Green,  [facility]    MG    [id number]

365 



        14:30:00 23:59:59                 Charisse N                 51      

 

        2021 Outpatient                 [facility]    IE    [id number]

365 [facility]



        14:30:00 14:30:00                                         51      l



                                                                        Barron

 

        2021 Outpatient                 [facility]     [id number]

901 [place]



        00:00:00 00:00:00                                         398     Method

i



                                                                        st

 

        2021 Between                 [facility] 3467

[id number] Memoria



        13:38:03 13:38:03 Visit                   r       Medicine 57      l



                                                        Rgant         Donny martinez

 

        2021 Between                 [facility] 3467

[id number] Memoria



        13:38:03 13:38:03 Visit                   r       Medicine 57      l



                                                        Rudy martinez

 

        2021 Outpatient                 MG    MG    [id number]

375 



        08:38:03 08:38:03                                         57      

 

        2021 Outpatient         MED,  [facility]     [id number]

980 [place]



        00:00:00 00:00:00                 RAZIUDDIN                 554     Meth

farhana



                                                                        st

 

        2021 Inpatient         ANAIS [facility]     025     2100

997475 [place]



        00:00:00 00:00:00                 MELVIN                   541     Method

i



                                                                        

 

        2021 Inpatient         ANAIS [facility]     Ayse     2100

165063 [place]



        00:00:00 00:00:00                 MELVIN                   559     Method

i



                                                                        st

 

        2020 Inpatient         ANAIS [facility]     012     2100

923232 [place]



        00:00:00 00:00:00                 MELVIN                   271     Method

i



                                                                        

 

        2020 Outpatient                 [facility] 3

[id number] Memoria



        16:30:00 05:59:59                         r       Medicine 50      l



                                                        Rudy Hines

n

 

        2020 Outpatient                 [facility] 3

[id number] Memoria



        16:30:00 05:59:59                         r       Medicine 50      l



                                                        Rudy Hines

n

 

        2020 Outpatient         Green,  MG    MG    [id number]

365 



        10:30:00 23:59:59                 Charisse N                 50      

 

        2020 Outpatient                 [facility]    IE    [id number]

365 [facility]



        10:30:00 10:30:00                                         50      l



                                                                        [place]

 

        2020 Inpatient         SOLIPURAM, [facility]     012     [id number]

21267 [place]



        00:00:00 00:00:00                 MELISSA                    464     Method

i



                                                                        st

 

        2020-10-23 2020 Inpatient         SOLIPURAM, [facility]     012     [id number]

92880 [place]



        00:00:00 00:00:00                 MELISSA                    557     Method

i



                                                                        st

 

        2020-10-23 2020-10-24 Outpt Diag                 nullFlavo [facility]    [id number]

[id number] Memoria



        18:10:00 04:59:00 Services                 r       Outpatient 06      l



                                                        Imaging         [place]



                                                        [place]         

 

        2020-10-23 2020-10-24 Outpt Diag                 nullFlavo [facility]    [id number]

[id number] Memoria



        18:10:00 04:59:00 Services                 r       Outpatient 06      l



                                                        Imaging         [place]



                                                        [place]         

 

        2020-10-23 2020-10-23 Outpatient         Stadny[place], MH29    29    [id number]

[id number] 



        13:10:00 23:59:00                 Abdi MARTINEZ                 06      

 

        2020-10-21 2020-10-22 Between                 nullFlavo [facility] 3467

[id number] Memoria



        17:58:19 17:58:19 Visit                   r       Medicine 56      l



                                                        Rudy Hines

n

 

        2020-10-21 2020-10-22 Between                 nullFlavo [facility] 3467

[id number] Memoria



        17:58:19 17:58:19 Visit                   r       Medicine 56      l



                                                        Rudy Hines

n

 

        2020-10-21 2020-10-22 Outpatient                 MG    [facility]    [id number]

375 



        12:58:19 12:58:19                                         56      

 

        2020-10-13 2020-10-14 Outpatient                 nullFlavo [facility] Family 3

[id number] Memoria



        15:15:00 04:59:59                         r       Medicine 49      l



                                                        Rudy Hines

n

 

        2020-10-13 2020-10-14 Outpatient                 nullFlavo [facility] Family 3

[id number] Memoria



        15:15:00 04:59:59                         r       Medicine 49      l



                                                        Rudy Hines

n

 

        2020-10-13 2020-10-13 Outpatient         Peter,  MG    MG    [id number]

365 



        10:15:00 23:59:59                 Charisse N                 49      

 

        2020-10-13 2020-10-13 Outpatient                 [facility]    [facility]    [id number]

365 Memoria



        10:15:00 10:15:00                                         49      l



                                                                        [place]

 

        2020 Outpt Diag                 nullFlavo [facility]    [id number]

[id number] Memoria



        17:02:00 04:59:00 Services                 r       Outpatient 05      l



                                                        Imaging         [place]



                                                        [place]         

 

        2020 Outpt Diag                 nullFlavo [facility]    [id number]

07524 Memoria



        17:02:00 04:59:00 Services                 r       Outpatient 05      l



                                                        Imaging         [place]



                                                        [place]         

 

        2020 Outpatient         [place], 29    [facility]    [id number]

[id number] 



        12:02:00 23:59:00                 Abdi N                 05      

 

        2020 Ambulatory                 nullFlavo [facility]    [id number]

90474 Memoria



        19:00:00 19:00:00 Pre-Reg                 r       Nephrology 46      l



                                                        Rudy Hines

n

 

        2020 Ambulatory                 nullFlavo [facility]    [id number]

[id number] Memoria



        19:00:00 19:00:00 Pre-Reg                 r       Nephrology 46      l



                                                        Rudy Hines

n

 

        2020 Outpatient                 [facility]    IE    [id number]

365 Memoria



        14:00:00 14:00:00                                         46      l



                                                                        [place]

 

        2020 Outpatient         Beth- MG    [facility]    346

[id number] 



        14:00:00 14:00:00                 Jese Gallegos                         

 

        2020 Outpatient                 [facility]    [facility]    [id number]

365 Memoria



        11:15:00 11:15:00                                         47      l



                                                                        [place]

 

        2020 Outpatient                 [facility]    [facility]    [id number]

365 Memoria



        11:15:00 11:15:00                                         47      l



                                                                        [place]

 

        2020 Outpatient                 nullFlavo MG    [id number]

[id number] Memoria



        19:45:00 04:59:59                         r       Nephrology 48      dennis Hines

michelle

 

        2020 Outpatient                 nullFlavo MG    [id number]

[id number] Memoria



        19:45:00 04:59:59                         r       Nephrology 48      dennis Hines

michelle

 

        2020 [facility]         [facility]

[id number] 



        14:45:00 23:59:59                 [place]aca,                   48      



                                        Gabriela CLARK                         

 

        2020 Outpatient                 [facility]    [facility]    [id number]

365 Memoria



        14:45:00 14:45:00                                         48      dennis



                                                                        [place]

 

        2020 Outpatient         CHAD [facility]     [id number]

[address] [place] [address]



        00:00:00 00:00:00                 EMILIA Quinonez

i



                                                                        

 

        2020 Phone                   nullFlavo MG    [id number]

55 Memoria



        16:17:50 04:59:59 Message                 r       Nephrology 12      dennis Hines

michelle

 

        2020 Phone                   nullFlavo MG    [id number]

55 Memoria



        16:17:50 04:59:59 Message                 r       Nephrology 12      dennis Hines

michelle

 

        2020 Outpatient                 MG    MG    [id number]

355 



        11:17:50 23:59:59                                         12      

 

        2020 Outpatient                 nullFlavo MG    [id number]

[id number] Memoria



        19:00:00 04:59:59                         r       Nephrology 41      dennis Hines

michelle

 

        2020 Outpatient                 nullFlavo MG    [id number]

[id number] Memoria



        19:00:00 04:59:59                         r       Nephrology 41      l



                                                        Rudy Hines

michelle

 

        2020 Outpatient         [facility]    346

[id number] 



        14:00:00 23:59:59                 [place],                   41      



                                        Gabriela CLARK                         

 

        2020 Outpatient                 [facility]    [facility]    [id number]

365 Memoria



        14:00:00 14:00:00                                         41      dennis Mart

 

        2020 Phone                   nullFlavo MG    [id number]

55 Memoria



        18:35:51 04:59:59 Message                 r       Nephrology 11      dennis Mart

 

        2020 Phone                   nullFlavo MG    [id number]

55 Memoria



        18:35:51 04:59:59 Message                 r       Nephrology 11      dennis Mart

 

        2020 Outpatient                 [facility]    MG    [id number]

355 



        13:35:51 23:59:59                                         11      

 

        2020 Outpatient                 nullFlavo MG Family 3

[id number] Memoria



        15:15:00 04:59:59                         r       Medicine 45      dennis Hines

n

 

        2020 Outpatient                 nullFlavo MG Family 3

[id number] Memoria



        15:15:00 04:59:59                         r       Medicine 45      dennis Hines

n

 

        2020 Outpatient         [place],  [facility]    MG    [id number]

365 



        10:15:00 23:59:59                 Charisse N                 45      

 

        2020 Ambulatory                 nullFlavo MG Family 3

[id number] Memoria



        15:15:00 15:15:00 Pre-Reg                 r       Medicine 44      dennis Hines

n

 

        2020 Ambulatory                 nullFlavo MG Family 3

[id number] Memoria



        15:15:00 15:15:00 Pre-Reg                 r       Medicine 44      dennis Hines

n

 

        2020 Outpatient                 [facility]    [facility]    [id number]

365 Memoria



        10:15:00 10:15:00                                         45      dennis Mart

 

        2020 Outpatient                 [facility]    [facility]    [id number]

365 Memoria



        10:15:00 10:15:00                                         44      dennis Mart

 

        2020 Outpatient         Green,  [facility]    MG    [id number]

365 



        10:15:00 10:15:00                 Charisse N                 44      

 

        2020 Phone                   nullFlavo MG    [id number]

55 Memoria



        13:23:32 04:59:59 Message                 r       Nephrology 10      dennis Hines

n

 

        2020 Phone                   nullFlavo MG    [id number]

55 Memoria



        13:23:32 04:59:59 Message                 r       Nephrology 10      dennis Hines

michelle

 

        2020 Outpatient                 [facility]    MG    [id number]

355 



        08:23:32 23:59:59                                         10      

 

        2020 Between                 nullFlavo [facility] 3467

[id number] Memoria



        14:14:54 14:14:54 Visit                   r       Medicine 52      dennis Hines

michelle

 

        2020 Between                 nullFlavo [facility] 3467

[id number] Memoria



        14:14:54 14:14:54 Visit                   r       Medicine 52      dennis Hines

michelle

 

        2020 Outpatient                 [facility]    MG    [id number]

375 



        09:14:54 09:14:54                                         52      

 

        2020 Outpatient                 [facility]    [facility]    [id number]

365 Memoria



        11:30:00 11:30:00                                         43      dennis



                                                                        [place]

 

        2020 Outpatient                 [facility]    [facility]    [id number]

365 Memoria



        11:30:00 11:30:00                                         43      dennis



                                                                        [place]

 

        2020 2020-04-15 Phone                   nullFlavo [facility] 3467

[id number] Memoria



        20:00:15 04:59:59 Message                 r       Medicine 09      dennis Hines

michelle

 

        2020 2020-04-15 Phone                   nullFlavo [facility] 3467

[id number] Memoria



        20:00:15 04:59:59 Message                 r       Medicine 09      dennis Hines

michelle

 

        2020 Outpatient                 MG    [facility]    [id number]

355 



        15:00:15 23:59:59                                         09      

 

        2020-04-10 2020 Phone                   nullFlavo [facility] 3467

[id number] Memoria



        17:18:28 04:59:59 Message                 r       Medicine 08      dennis Hines

michelle

 

        2020-04-10 2020 Phone                   nullFlavo [facility] 3467

[id number] Memoria



        17:18:28 04:59:59 Message                 r       Medicine 08      dennis Hines

michelle

 

        2020-04-10 2020 Phone                   nullFlavo [facility]    [id number]

55 Memoria



        13:26:55 04:59:59 Message                 r       Nephrology 07      dennis Rosariosylvie martinez

 

        2020-04-10 2020 Phone                   nullFlavo [facility]    [id number]

55 Memoria



        13:26:55 04:59:59 Message                 r       Nephrology 07      l



                                                        Rudy Hines

n

 

        2020-04-10 2020 Outpatient                 [facility]    MG    [id number]

355 



        12:18:28 23:59:59                                         08      

 

        2020-04-10 2020 Outpatient                 [facility]    MG    [id number]

355 



        08:26:55 23:59:59                                         07      

 

        2020 Outpatient                 nullFlavo MG Family 3

[id number] Memoria



        20:15:00 05:59:59                         r       Medicine 42      l



                                                        Rudy Hines

n

 

        2020 Outpatient                 nullFlavo MG Family 3

[id number] Memoria



        20:15:00 05:59:59                         r       Medicine 42      l



                                                        Rudy Hines

n

 

        2020 Outpatient         Green,  MG    MG    [id number]

365 



        14:15:00 23:59:59                 Charisse MARTINEZ                 42      

 

        2020 Outpatient                 [facility]    [facility]    [id number]

365 Memoria



        14:15:00 14:15:00                                         42      dennis



                                                                        [place]

 

        2020 Phone                   nullFlavo [facility] 3467

[id number] Memoria



        14:22:27 05:59:59 Message                 r       Medicine 06      dennis Hines

michelle

 

        2020 Phone                   nullFlavo [facility] 3467

[id number] Memoria



        14:22:27 05:59:59 Message                 r       Medicine 06      dennis martinez

 

        2020 Outpatient                 [facility]    MG    [id number]

355 



        08:22:27 23:59:59                                         06      

 

        2019 Phone                   nullFlavo MG Family 3467

[id number] Memoria



        16:06:04 05:59:59 Message                 r       Medicine 05      dennis Hines

michelle

 

        2019 Phone                   nullFlavo [facility] 3467

[id number] Memoria



        16:06:04 05:59:59 Message                 r       Medicine 05      dennis Hines

n

 

        2019 Outpatient                 [facility]    MG    [id number]

355 



        10:06:04 23:59:59                                         2019 Between                 nullFlavo MG    [id number]

75 Memoria



        20:05:03 20:05:03 Visit                   r       Nephrology 45      dennis Mart

 

        2019 Between                 nullFlavo [facility]    [id number]

75 Memoria



        20:05:03 20:05:03 Visit                   r       Nephrology 45      dennis Mart

 

        2019 Outpatient                 [facility]    [id number]

375 



        14:05:03 14:05:03                                         45      

 

        2019 Outpatient                 nullFlavo [facility]    [id number]

[id number] Memoria



        20:45:00 05:59:59                         r       Nephrology 38      dennis Hines

n

 

        2019 Outpatient                 nullFlavo [facility]    [id number]

[id number] Memoria



        20:45:00 05:59:59                         r       Nephrology 38      dennis Hines

n

 

        2019 Outpatient         [facility]    346

[id number] 



        14:45:00 23:59:59                 Rosy,                   Brianda CLARK                         

 

        2019 [facility]    [id number]

365 [facility]



        14:45:00 14:45:00                                         38      dennis Mart

 

        2019 Between                 nullFlavo [facility]    [id number]

75 Memoria



        00:07:10 00:07:10 Visit                   r       Nephrology 43      dennis Mart

 

        2019 Between                 nullFlavo [facility]    [id number]

75 Memoria



        00:07:10 00:07:10 Visit                   r       Nephrology 43      dennis Mart

 

        2019 Outpatient                 [facility]    [id number]

375 



        18:07:10 18:07:10                                         43      

 

        2019 Outpatient                 IE    IE    [id number]

365 Memoria



        09:00:00 09:00:00                                         39      dennis Mart

 

        2019 Outpatient                 IE    IE    [id number]

365 Memoria



        09:00:00 09:00:00                                         39      dennis



                                                                        [place]

 

        2019 Between                 nullFlavo [facility] 3467

[id number] Memoria



        21:47:12 21:47:12 Visit                   r       Medicine 41      dennis Hines

michelle

 

        2019 Between                 nullFlavo [facility] 3467

[id number] Memoria



        21:47:12 21:47:12 Visit                   r       Medicine 41      dennis Hines

n

 

        2019 Outpatient                 [facility]    [facility]    [id number]

375 



        15:47:12 15:47:12                                         41      

 

        2019-10-29 2019-10-30 Between                 nullFlavo MG Family 3467

[id number] Memoria



        22:13:13 22:13:13 Visit                   r       Medicine 40      dennis Hines

n

 

        2019-10-29 2019-10-30 Between                 nullFlavo MG Family 3467

[id number] Memoria



        22:13:13 22:13:13 Visit                   r       Medicine 40      dennis Hines

n

 

        2019-10-29 2019-10-30 Outpatient                 [facility]    [facility]    [id number]

375 



        17:13:13 17:13:13                                         40      

 

        2019-10-25 2019-10-26 Outpatient                 nullFlavo [facility] Family 3

[id number] Memoria



        14:45:00 04:59:59                         r       Medicine 40      dennis Hines

n

 

        2019-10-25 2019-10-26 Outpatient                 nullFlavo [facility] 3

[id number] Memoria



        14:45:00 04:59:59                         r       Medicine 40      dennis Hines

n

 

        2019-10-25 2019-10-25 Outpatient         Green,  MG    MG    [id number]

365 



        09:45:00 23:59:59                 Charisse N                 40      

 

        2019-10-25 2019-10-25 Outpatient                 [facility]    [facility]    [id number]

365 Memoria



        09:45:00 09:45:00                                         40      dennis Mart

 

        2019-10-17 2019-10-17 Ambulatory                 nullFlavo [facility] 3

[id number] Memoria



        16:00:00 16:00:00 Pre-Reg                 r       Medicine 36      dennis Hines

n

 

        2019-10-17 2019-10-17 Ambulatory                 nullFlavo [facility] 3

[id number] Memoria



        16:00:00 16:00:00 Pre-Reg                 r       Medicine 36      dennis Hines

n

 

        2019-10-17 2019-10-17 Outpatient                 [facility]    [facility]    [id number]

365 Memoria



        11:00:00 11:00:00                                         36      dennis Mart

 

        2019-10-17 2019-10-17 Outpatient         Green,  [facility]    MG    [id number]

365 



        11:00:00 11:00:00                 Charisse N                 36      

 

        2019-10-10 2019-10-11 Outpatient                 nullFlavo [facility]    [id number]

[id number] Memoria



        15:00:00 04:59:59                         r       Nephrology 35      denins Hines

n

 

        2019-10-10 2019-10-11 Outpatient                 nullFlavo [facility]    [id number]

[id number] Memoria



        15:00:00 04:59:59                         r       Nephrology 35      dennis Hines

n

 

        2019-10-10 2019-10-10 Outpatient         [place],  MG    MG    [id number]

365 



        10:00:00 23:59:59                 Charisse N                 35      

 

        2019-10-10 2019-10-10 Outpatient                 IE    IE    [id number]

365 Memoria



        12:00:00 12:00:00                                         37      dennis Mart

 

        2019-10-10 2019-10-10 Outpatient                 [facility]    [facility]    [id number]

365 Memoria



        12:00:00 12:00:00                                         37      dennis Mart

 

        2019-10-10 2019-10-10 Outpatient                 [facility]    [facility]    [id number]

365 Memoria



        10:00:00 10:00:00                                         35      dennis Mart

 

        2019 Phone                   nullFlavo [facility] 3467

[id number] Memoria



        15:15:06 04:59:59 Message                 r       Medicine 04      dennis Hines

n

 

        2019 Phone                   nullFlavo [facility] 3467

[id number] Memoria



        15:15:06 04:59:59 Message                 r       Medicine 04      dennis Hines

n

 

        2019 Phone                   nullFlavo [facility]    [id number]

55 Memoria



        15:10:51 04:59:59 Message                 r       Nephrology 03      dennis Hines

n

 

        2019 Phone                   nullFlavo MG    [id number]

55 Memoria



        15:10:51 04:59:59 Message                 r       Nephrology 03      dennis Hines

n

 

        2019 Outpatient                 MG    MG    [id number]

355 



        10:15:06 23:59:59                                         04      

 

        2019 [facility]    [id number]

355 



        10:10:51 23:59:59                                         03      

 

        2019 Ambulatory                 nullFlavo MG    [id number]

[id number] Memoria



        20:00:00 20:00:00 Pre-Reg                 r       Nephrology 34      l



                                                        Rudy Hines

n

 

        2019 Ambulatory                 nullFlavo [facility]    [id number]

[id number] Memoria



        20:00:00 20:00:00 Pre-Reg                 r       Nephrology 34      dennis martinez

 

        2019 Outpatient                 [facility]    [id number]

365 Memoria



        15:00:00 15:00:00                                         34      dennis Mart

 

        2019 Outpatient         [place]    346

[id number] 



        15:00:00 15:00:00                 Tate Gallegos      



                                        Gabriela CLARK                         

 

        2019 Between                 nullFlavo MG    [id number]

75 Memoria



        20:15:16 20:15:16 Visit                   r       Nephrology 34      dennis Mart

 

        2019 Between                 nullFlavo MG    [id number]

75 Memoria



        20:15:16 20:15:16 Visit                   r       Nephrology 34      dennis Mart

 

        2019 Outpatient                 [facility]    [id number]

375 



        15:15:16 15:15:16                                         34      

 

        2019 Phone                   nullFlavo MG    [id number]

55 Memoria



        15:29:54 04:59:59 Message                 r       Nephrology 02      dennis Mart

 

        2019 Phone                   nullFlavo MG    [id number]

55 Memoria



        15:29:54 04:59:59 Message                 r       Nephrology 02      dennis Mart

 

        2019 Outpatient                 [facility]    [id number]

355 



        10:29:54 23:59:59                                         02      

 

        2019 Ambulatory                 nullFlavo MG    [id number]

[id number] Memoria



        16:30:00 16:30:00 Pre-Reg                 r       Nephrology 29      l



                                                        Rudy martinez

 

        2019 Ambulatory                 nullFlavo MG    [id number]

[id number] Memoria



        16:30:00 16:30:00 Pre-Reg                 r       Nephrology 29      l



                                                        Rudy martinez

 

        2019 Ambulatory                 nullFlavo MG    [id number]

[id number] Memoria



        16:15:00 16:15:00 Pre-Reg                 r       Nephrology 30      l



                                                        Rudy martinez

 

        2019 Ambulatory                 nullFlavo MG    [id number]

[id number] [facility]oria



        16:15:00 16:15:00 Pre-Reg                 r       Nephrology 30      dennis martinez

 

        2019 Ambulatory                 nullFlavo [facility] Family 3

[id number] Memoria



        15:15:00 15:15:00 Pre-Reg                 r       Medicine 31      dennis martinez

 

        2019 Ambulatory                 nullFlavo [facility]

[id number] Memoria



        15:15:00 15:15:00 Pre-Reg                 r       Medicine 31      dennis martinez

 

        2019 Outpatient                 MHIE    MHIE    [id number]

365 [facility]



        11:30:00 11:30:00                                         29      dennis



                                                                        [place]

 

        2019 Outpatient         [place]- MG    MG    346

[id number] 



        11:30:00 11:30:00                 Samsonalyssia                   29      



                                        Gabriela EDUARDO                         

 

        2019 Outpatient                 MHIE    MHIE    [id number]

365 [facility]



        11:15:00 11:15:00                                         30      dennis



                                                                        [place]

 

        2019 Outpatient         [place]- MG    MG    346

[id number] 



        11:15:00 11:15:00                 Dacalyssia                   30      



                                        Gabriela EDUARDO                         

 

        2019 Outpatient                 MHIE    MHIE    [id number]

365 [facility]



        10:15:00 10:15:00                                         31      dennis



                                                                        [place]

 

        2019 Outpatient         Green,  MG    MG    [id number]

365 



        10:15:00 10:15:00                 Charisse MARTINEZ                       

 

        2019 [place] PABLITO PARRPABLITO   [facility] [place]    [id number]

[address] [place]



        10:05:00 17:55:00                 GOPAL                         Medica

[facility]

 

        2019 Outpatient PABLITO ADEN   [facility] [place]    [id number]

427 [place]



        21:36:00 19:00:00                 GOPAL                         Medica

[facility]

 

        2019 Outpatient                 nullFlavo MH Urgent 346

[id number] Memoria



        20:40:00 04:59:59                         r       Care    33      l



                                                        Candace         [place]

 

        2019 Outpatient                 nullFlavo MH Urgent 346

[id number] Memoria



        20:40:00 04:59:59                         r       Care    33      dennis Maria         [place]

 

        2019 Outpatient         Van     [facility]    [facility]    [id number]

365 



        15:40:00 23:59:59                 Mitch                 Yousif louise                           

 

        2019 Outpatient                 MHIE    MHIE    [id number]

365 [facility]



        15:40:00 15:40:00                                         33      dennis



                                                                        [place]

 

        2019 Outpatient                 nullFlavo [facility] Family 3

[id number] Memoria



        15:15:00 04:59:59                         r       Medicine 32      dennis Hines

michelle

 

        2019 Outpatient                 nullFlavo [facility] 3

[id number] [facility]



        15:15:00 04:59:59                         r       Medicine 32      dennis Hines

michelle

 

        2019 Outpatient         Beth- [facility]    [facility]    346

[id number] 



        10:15:00 23:59:59                 Kristie Gallegos                         

 

        2019 Outpatient                 MHIE    MHIE    [id number]

365 [facility]



        10:15:00 10:15:00                                         32      dennis



                                                                        [place]

 

        2019 Between                 nullFlavo MG Family 3467

[id number] Memoria



        19:00:16 19:00:16 Visit                   r       Medicine 29      l



                                                        Grant         Donny

michelle

 

        2019 Between                 nullFlavo MG Family 3467

[id number] Memoria



        19:00:16 19:00:16 Visit                   r       Medicine 29      l



                                                        Grant         Donny martinez

 

        2019 Outpatient                 MG    MG    [id number]

375 



        14:00:16 14:00:16                                         29      

 

        2019 2019-03-15 Between                 nullFlavo [facility]    [id number]

75 [facility]



        15:02:37 15:02:37 Visit                   r       Nephrology 27      l



                                                        Grant         Donny martinez

 

        2019 2019-03-15 Between                 nullFlavo [facility]    [id number]

75 [facility]



        15:02:37 15:02:37 Visit                   r       Nephrology 27      l



                                                        Rudy martinez

 

        2019 2019-03-15 Outpatient                 MG    [facility]    [id number]

375 



        10:02:37 10:02:37                                         27      

 

        2019 2019-03-15 Outpatient                 nullFlavo [facility]    [id number]

[id number] Memoria



        18:45:00 04:59:59                         r       Nephrology 28      dennis Hines

n

 

        2019 2019-03-15 Outpatient                 nullFlavo MG    [id number]

[id number] Memoria



        18:45:00 04:59:59                         r       Nephrology 28      l



                                                        Rudy Hines

n

 

        2019 2019-03-15 Outpatient                 nullFlavo MG Family 3

[id number] Memoria



        14:15:00 04:59:59                         r       Medicine 22      dennis Hines

n

 

        2019 2019-03-15 Outpatient                 nullFlavo MG Family 3

[id number] Memoria



        14:15:00 04:59:59                         r       Medicine 22      dennis Hines

n

 

        2019 Outpatient         [facility]    346

[id number] 



        13:45:00 23:59:59                 Husam Gallegos                         

 

        2019 Outpatient         Green,  [facility]    [id number]

365 



        09:15:00 23:59:59                 Charisse MARTINEZ                 22      

 

        2019 Outpatient                 MHIE    MHIE    [id number]

365 [facility]



        13:45:00 13:45:00                                         28      dennis Mart

 

        2019 Outpatient                 MHIE    MHIE    [id number]

365 [facility]



        09:15:00 09:15:00                                         22      dennis Mart

 

        2019 Between                 nullFlavo MG    [id number]

75 [facility]



        23:59:47 23:59:47 Visit                   r       Nephrology 26      dennis Hines

michelle

 

        2019 Between                 nullFlavo MG    [id number]

75 [facility]



        23:59:47 23:59:47 Visit                   r       Nephrology 26      dennis Hines

michelle

 

        2019 Outpatient                 MG    MG    [id number]

375 



        18:59:47 18:59:47                                         26      

 

        2019 Between                 nullFlavo MG    [id number]

75 [facility]



        22:00:33 22:00:33 Visit                   r       Nephrology 24      l



                                                        Rudy Hines

michelle

 

        2019 Between                 nullFlavo MG    [id number]

75 [facility]



        22:00:33 22:00:33 Visit                   r       Nephrology 24      dennis Hines

n

 

        2019 Outpatient                 MG    MG    [id number]

375 



        16:00:33 16:00:33                                         24      

 

        2019 Between                 nullFlavo MG    [id number]

75 [facility]



        04:48:44 04:48:44 Visit                   r       Nephrology 23      dennis martinez

 

        2019 Between                 nullFlavo MG    [id number]

75 [facility]



        04:48:44 04:48:44 Visit                   r       Nephrology 23      dennis Hines

n

 

        2019 Outpatient                 [facility]    MG    [id number]

375 



        22:48:44 22:48:44                                         23      

 

        2019 Ambulatory                 nullFlavo MG    [id number]

[id number] [facility]



        20:30:00 20:30:00 Pre-Reg                 r       Nephrology 27      dennis martinez

 

        2019 Ambulatory                 nullFlavo MG    [id number]

[id number] [facility]



        20:30:00 20:30:00 Pre-Reg                 r       Nephrology 27      l



                                                        Rudy Hines

n

 

        2019 Ambulatory                 nullFlavo MG    [id number]

[id number] [facility]



        18:40:00 18:40:00 Pre-Reg                 r       Nephrology 24      dennis martinez

 

        2019 Ambulatory                 nullFlavo MG    [id number]

[id number] [facility]



        18:40:00 18:40:00 Pre-Reg                 r       Nephrology 24      dennis martinez

 

        2019 Outpatient                 MHIE    IE    [id number]

365 [facility]



        14:30:00 14:30:00                                         27      dennis Mart

 

        2019 Outpatient         [place]- [facility]    MG    346

[id number] 



        14:30:00 14:30:00                 Zuleyma Gallegos                         

 

        2019 Outpatient         [place]- [facility]    MG    346

[id number] 



        14:30:00 14:30:00                 Zuleyma Gallegos                         

 

        2019 Outpatient                 MHIE    IE    [id number]

365 [facility]



        12:40:00 12:40:00                                         24      dennis Mart

 

        2019 Outpatient         [place]    346

[id number] 



        12:40:00 12:40:00                 Michael Gallegos                         

 

        2019 Outpatient         [place]    346

[id number] 



        12:40:00 12:40:00                 Michael Gallegos                         

 

        2019 Ambulatory                 nullFlavo [facility]    [id number]

06053 Memoria



        15:30:00 15:30:00 Pre-Reg                 r       Internal 25      l



                                                        [facility]         

 

        2019 Ambulatory                 nullFlavo [facility]    [id number]

[id number] Memoria



        15:30:00 15:30:00 Pre-Reg                 r       Internal 25      [facility]         

 

        2019 Outpatient                 [facility]    [id number]

365 [facility]



        09:30:00 09:30:00                                         [address] [place] [address]

 

        2019 Outpatient                 [facility]    [id number]

365 



        09:30:00 09:30:00                                         25      

 

        2018 2018-10-20 Ambulatory                 nullFlavo [facility]    [id number]

[id number] Memoria



        12:45:00 12:45:00 Pre-Reg                 r       Internal 23      [facility]         

 

        2018 2018-10-20 Ambulatory                 nullFlavo [facility]    [id number]

[id number] Memoria



        12:45:00 12:45:00 Pre-Reg                 r       Internal 23      [facility]         

 

        2018 2018-10-20 Outpatient                 MG    [facility]    [id number]

365 



        07:45:00 07:45:00                                         23      

 

        2018 Outpatient                 nullFlavo MG    [id number]

[id number] Memoria



        19:15:00 04:59:59                         r       Nephrology 26      l



                                                        Rudy Hines

michelle

 

        2018 Outpatient                 nullFlavo MG    [id number]

[id number] Memoria



        19:15:00 04:59:59                         r       Nephrology 26      l



                                                        Rudy Hines

michelle

 

        2018 Outpatient                 nullFlavo MG    [id number]

[id number] Memoria



        18:00:00 04:59:59                         r       Nephrology 21      l



                                                        Grant         Donny martinez

 

        2018 Outpatient                 nullFlavo     [id number]

[id number] Memoria



        18:00:00 04:59:59                         r       Nephrology 21      l



                                                        Rudy Hines

michelle

 

        2018 [facility]         [facility]    346

[id number] 



        14:15:00 23:59:59                 DacTay cade      



                                        Gabriela CLARK                         

 

        2018 [facility]

[id number] 



        13:00:00 23:59:59                 Dacalyssia                   21      



                                        Gabriela CLARK                         

 

        2018 Outpatient                 IE    IE    [id number]

365 [facility]



        14:15:00 14:15:00                                         26      dennis



                                                                        [place]

 

        2018 Outpatient                 MHIE    IE    [id number]

365 [facility]



        13:00:00 13:00:00                                         21      dennis



                                                                        [place]

 

        2018 Outpatient                 MHIE    IE    [id number]

365 [facility]



        07:45:00 07:45:00                                         23      dennis



                                                                        [place]

 

        2018 Outpatient                 nullFlavo MG Family 3

[id number] Memoria



        18:30:00 04:59:59                         r       Medicine 20      l



                                                        Rudy Hines

michelle

 

        2018 Outpatient                 nullFlavo MG Family 3

[id number] Memoria



        18:30:00 04:59:59                         r       Medicine 20      dennis Hines

n

 

        2018 Outpatient         Green,  MG    MG    [id number]

365 



        13:30:00 23:59:59                 Charisse MARTINEZ                 20      

 

        2018 Outpatient                 IE    IE    [id number]

365 [facility]



        13:30:00 13:30:00                                         20      dennis



                                                                        [place]

 

        2018 Outpatient                 nullFlavo MG    [id number]

[id number] Memoria



        16:00:00 04:59:59                         r       Nephrology 19      l



                                                        Rudy Hines

michelle

 

        2018 Outpatient                 nullFlavo MG    [id number]

[id number] [facility]



        16:00:00 04:59:59                         r       Nephrology 19      dennis Hines

michelle

 

        2018 [facility]

[id number] 



        11:00:00 23:59:59                 Daca                   19      



                                        Gabriela CLARK                         

 

        2018 Outpatient         [facility]    MG    346

[id number] 



        11:00:00 23:59:59                 Toney Gallegos      



                                        Gabriela EDUARDO                         

 

        2018 Outpatient                 MHIE    MHIE    [id number]

365 [facility]



        11:00:00 11:00:00                                         19      dennis



                                                                        [place]

 

        2018 Outpatient                 nullFlavo [facility] 3

[id number] Memoria



        15:00:00 04:59:59                         r       Medicine 18      dennis Grant         Donny

michelle

 

        2018 Outpatient                 nullFlavo [facility] 3

[id number] Memoria



        15:00:00 04:59:59                         r       Medicine 18      dennis Palacioberg         Donny martinez

 

        2018 Outpatient         [place],  [facility]    [facility]    [id number]

365 



        10:00:00 23:59:59                 Charisse N                 18      

 

        2018 Outpatient         Green,  [facility]    MG    [id number]

365 



        10:00:00 23:59:59                 Charisse N                 18      

 

        2018 Outpatient                 MHIE    MHIE    [id number]

365 [facility]



        10:00:00 10:00:00                                         18      dennis Mart

 

        2017 Outpatient                 MHIE    MHIE    [id number]

365 [facility]



        10:40:00 10:40:00                                         16      dennis Mart

 

        2017 Outpatient                 MHIE    MHIE    [id number]

365 [facility]



        10:40:00 10:40:00                                         16      dennis Mart

 

        2017 Outpatient                 MHIE    MHIE    [id number]

365 [facility]



        11:30:00 11:30:00                                         17      dennis Mart

 

        2017 Outpatient                 MHIE    MHIE    [id number]

365 [facility]



        11:30:00 11:30:00                                         17      dennis Mart

 

        2017 Outpatient                 MHIE    MHIE    [id number]

365 [facility]



        11:40:00 11:40:00                                         11      dennis Mart

 

        2017 Outpatient                 MHIE    MHIE    [id number]

365 [facility]



        11:40:00 11:40:00                                         11      dennis Mart

 

        2017 Outpatient                 MHIE    MHIE    [id number]

365 [facility]



        14:30:00 14:30:00                                         15      dennis Mart

 

        2017 Outpatient                 MHIE    MHIE    [id number]

365 [facility]



        14:30:00 14:30:00                                         15      dennis Mart

 

        2017 Outpatient                 MHIE    MHIE    [id number]

365 [facility]



        10:00:00 10:00:00                                         08      dennis Mart

 

        2017 Outpatient                 MHIE    MHIE    [id number]

365 [facility]



        10:00:00 10:00:00                                         08      dennis Mart

 

        2017 Bedded                  nullFlavo [facility] [id number]

375 [facility]



        11:48:35 14:30:00 Outpatient                 r       [place] 13      dennis Harper



 

        2017 Bedded                  nullFlavo [facility] [id number]

375 [facility]



        11:48:35 14:30:00 Outpatient                 xuan Mart 13      dennis Harper



 

        2017 Outpatient         [place]    [facility]    [id number]

[id number] 



        05:48:35 08:30:00                 Randy COLLINS                 13      

 

        2017 Outpatient                 MHIE    MHIE    [id number]

365 [facility]



        13:15:00 13:15:00                                         14      dennis Mart

 

        2017 Outpatient                 MHIE    MHIE    [id number]

365 [facility]



        13:15:00 13:15:00                                         14      dennis Mart

 

        2016 Outpatient                 MHIE    MHIE    [id number]

365 [facility]



        09:30:00 09:30:00                                         12      dennis Mart

 

        2016 Outpatient                 MHIE    MHIE    [id number]

365 [facility]



        09:30:00 09:30:00                                         13      dennis Mart

 

        2016 Outpatient                 MHIE    MHIE    [id number]

365 [facility]



        09:30:00 09:30:00                                         12      dennis Mart

 

        2016 Outpatient                 MHIE    MHIE    [id number]

365 [facility]



        09:30:00 09:30:00                                         13      dennis Mart

 

        2016 Outpatient                 MHIE    MHIE    [id number]

365 [facility]



        10:20:00 10:20:00                                         09      dennis Mart

 

        2016 Outpatient                 [facility]    [id number]

365 [facility]



        10:20:00 10:20:00                                         09      dennis Rosarioann

 

        2016 Outpatient                 [facility]    [id number]

365 [facility]



        13:00:00 13:00:00                                         04      l



                                                                        [place]

 

        2016 Outpatient                 [facility]    [id number]

365 [facility]



        13:00:00 13:00:00                                         04      l



                                                                        [place]

 

        2016 Outpatient                 [facility]    [id number]

365 [facility]



        11:15:00 11:15:00                                         06      dennis



                                                                        [place]

 

        2016 Outpatient                 [facility]    [id number]

365 [facility]



        11:15:00 11:15:00                                         06      dennis



                                                                        [place]

 

        2016 OP Therapy                 nullFlavo [facility]     [id number]

[id number] Memoria



        13:00:00 04:59:00 Patients                 xuan Rajput 03      dennis Mart

 

        2016 OP Therapy                 nullFlavo [facility]     [id number]

[id number] Memoria



        13:00:00 04:59:00 Patients                 xuan Rajput 03      dennis Mart

 

        2016 Outpatient         Lei,   2.16.840. 2.16.840.1. 3

[id number] 



        08:00:00 23:59:00                 Yoan M [id number] 03    

  



                                                [id number] 5.55            

 

        2016 OP Therapy                 nullFlavo [facility]     [id number]

[id number] Memoria



        17:39:00 04:59:00 Patients                 xuan Rajput 02      dennis Rosarioann

 

        2016 OP Therapy                 nullFlavo [facility]     [id number]

[id number] Memoria



        17:39:00 04:59:00 Patients                 xuan       Regulo 02      dennis Mart

 

        2016 Outpatient         Lei,   2.16.840. 2.16.840.1. 3

[id number] 



        12:39:00 23:59:00                 Yoan M [id number] 02    

  



                                                3.615.55 5.55            

 

        2016 OP Therapy                 nullFlavo S[facility] 346

[id number] Memoria



        19:00:00 04:59:00 Patients                 r       [place]   01      dennis



                                                                        [place]

 

        2016 OP Therapy                 nullFlavo [facility] [place] 346

[id number] Memoria



        19:00:00 04:59:00 Patients                 r       [place]   01      [facility]

 

        2016 Outpatient         Do   2.16.840. 2.16.840.1. 3

[id number] 



        14:00:00 23:59:00                 Yoan SUSIE [id number]. [id number] 01    

  



                                                [id number] 5.69            

 

        2016 Outpt Diag                 nullFlavo [facility]    [id number]

[id number] Memoria



        16:14:00 04:59:00 Services                 r       Outpatient 04      l



                                                        Imaging         [place]



                                                        [place]         

 

        2016 Outpt Diag                 nullFlavo [facility]    [id number]

[id number] Memoria



        16:14:00 04:59:00 Services                 r       Outpatient 04      l



                                                        Imaging         [place]



                                                        [place]         

 

        2016 Outpatient         [place] [id number]    346

[id number] 



        11:14:00 23:59:00                 Roland Gallegoseta EDUARDO                         

 

        2016 OP Therapy                 nullFlavo [facility] [place] 346

[id number] [facility]



        19:00:00 04:59:00 Patients                 r       [place]   00      dennis



                                                                        [place]

 

        2016 OP Therapy                 nullFlavo [facility] 346

[id number] [facility]



        19:00:00 04:59:00 Patients                 r       [place]   00      [facility]

 

        2016 Outpatient         Do   [id number]. [id number]. 3

[id number] 



        14:00:00 23:59:00                 Yoan RICHARDS [id number]    

  



                                                [id number] 5.69            

 

        2016 [facility]                 [facility]    [id number]

365 [facility]



        10:20:00 10:20:00                                               [facility]

 

        2016 Outpatient                 [facility]    [id number]

365 [facility]



        10:20:00 10:20:00                                               [facility]

 

        2016-05-10 2016 Outpt Diag                 nullFlavo [facility]    [id number]

[id number] [facility]



        14:59:00 04:59:00 Services                 r       Outpatient 03      l



                                                        Imaging         Barron Zarate            

 

        2016-05-10 2016 Outpt Diag                 nullFlavo [facility]    [id number]

[id number] [facility]



        14:59:00 04:59:00 Services                 r       Outpatient 03      l



                                                        Imaging         Barron Zarate            

 

        2016-05-10 2016-05-10 Outpatient         Abbi Somers28    28    346

[id number] 



        09:59:00 23:59:00                 Atkins                  2016 Outpt Diag                 nullFlavo [facility]    [id number]

[id number] [facility]



        18:11:00 04:59:00 Services                 r       Outpatient 01      l



                                                        Imaging         Barron Pinzon         

 

        2016 Outpt Diag                 nullFlavo [facility]    [id number]

[id number] [facility]



        18:11:00 04:59:00 Services                 r       Outpatient 01      l



                                                        Imaging         Barron



                                                        [place]         

 

        2016 Outpatient         SomersAbbi pham 29    MH29    346

[id number] 



        13:11:00 23:59:00                 Atkins                  01      

 

        2016-03-10 2016-03-10 Outpatient                 [id number]. 2.16.840.1. 3

[id number]   [facility]



        12:50:31 19:25:00                         [id number]         

l



                                                [id number] 81.2000         Donny

n



                                                00                      [facility]



                                                                        l



                                                                        [facility] [place]

 

        2016-03-10 2016-03-10 Outpatient                 nullFlavo [facility]    [id number]

   [facility]



        12:50:31 19:25:00                         r                       l



                                                                        Barron

 

        2016-03-10 2016-03-10 Outpatient                 nullFlavo [facility]    [id number]

   [facility]



        12:50:31 19:25:00                         r                       l



                                                                        Barron

 

        2016 2016-02-10 Outpt Diag                 nullFlavo [facility]    [id number]

[id number] [facility]



        12:58:00 05:59:00 Services                 r       Outpatient 00      l



                                                        Imaging         [place]         

 

        2016 2016-02-10 Outpt Diag                 nullFlavo [facility]    [id number]

[id number] [facility]



        12:58:00 05:59:00 Services                 r       Outpatient 00      l



                                                        Imaging         [place]



                                                        [place]         

 

        2016 Outpatient         SomersAbbi pham 29    29    346

[id number] 



        06:58:00 23:59:00                 [place]                  00      

 

        2016 Outpatient                 [facility]    [id number]

365 [facility]



        10:15:00 10:15:00                                         02      dennis



                                                                        [place]

 

        2016 Outpatient                 [facility]    [id number]

365 [facility]



        10:15:00 10:15:00                                         02      dennis [place]

 

        2015 Outpatient                 [facility]    [id number]

365 [facility]



        11:30:00 11:30:00                                         00      dennis [place]

 

        2015 Outpatient                 [facility]    [id number]

365 [facility]



        11:30:00 11:30:00                                         00      [facility]

 

        2014 Outpatient                 nullFlavo [facility] 3467

[id number] [facility]oria



        01:16:00 05:59:00                         r       Barron [id number] 



                                                        [place]

 

        2014 Outpatient                 nullFlavo [facility] 3467

[id number] Memoria



        01:16:00 05:59:00                         r       Barron [id number] 



                                                        [place]

 

        2014 Outpatient         [facility] 2.16.840. 2.16.840.

1. [id number] 



        19:16:00 23:59:00                 , Vignesh [id number]        

 



                                        Caitlin  [id number] [id number]         



                                                00                      

 

        2014 Outpatient                 nullFlavo       [id number]

[id number] [facility]



        19:16:00 19:16:00                         r       [place] 01      [facility]

 

        2014 Outpatient                 nullFlavo       [id number]

[id number] [facility]



        19:16:00 19:16:00                         r       [place] 01      [facility]

 

        2014 Outpatient                 nullFlavo       [id number]

[id number] [facility]



        19:43:00 19:43:00                         r       [place] 00      



                                                                        [place]

 

        2014 Outpatient                 nullFlavo       [id number]

[id number] [facility]



        19:43:00 19:43:00                         r       [place] 00      



                                                                        [place]







Results







           Test Description Test Time  Test Comments Results    Result     Sour

e



                                                       Comments   

 

           PET/CT, CARDIAC 2022 Reason for ************************       

     



           PERF REST AND 16:05:00   exam:->angina ************************      

      



           STRESS                           ************[facility]            



                                            SUZI SEARS : 1955            



                                            Sex:                  



                                            F***********************            



                                            ************************            



                                            *************FINAL            



                                            REPORT PATIENT ID:            



                                            [id number] PROCEDURE:            



                                            MYOCARDIAL PERFUSION            



                                            PET/CT IMAGING            



                                            (Rest/Stress)CPT CODE:            



                                            56108 INDICATION:            



                                            Evaluation for chest            



                                            pain CARDIOVASCULAR            



                                            PROFILE:CAD History:            



                                            NoneSymptoms: Chest            



                                            painRisk Factors: Atrial            



                                            fibrillation, ESRDBMI:            



                                            34 STRESS             



                                            PROTOCOL:Pharmacologic            



                                            stress was achieved with            



                                            a 10-second intravenous            



                                            infusion of regadenoson            



                                            0.4 mg. The            



                                            radiopharmaceutical was            



                                            administered 30 seconds            



                                            after the start of the            



                                            regadenoson infusion.            



                                            IMAGING PROTOCOL:Limited            



                                            low-dose CT imaging was            



                                            performed for            



                                            attenuation correction.            



                                            39.9 mCi of Rb-82            



                                            chloride was injected            



                                            intravenously at rest,            



                                            and gated PET images            



                                            were obtained. Then,            



                                            39.9 mCi of Rb-82            



                                            chloride was injected            



                                            intravenously at peak            



                                            stress, and gated PET            



                                            images were obtained.            



                                            Image quality is good.            



                                            REST FINDINGS:HR:            



                                            79/minBP: 123/65            



                                            mmHgPrelim. EKG: Atrial            



                                            fibrillation with            



                                            incomplete RBBB and            



                                            nonspecific ST            



                                            changes.Perfusion:            



                                            Normal.Wall Motion:            



                                            Normal (LVEF >70%).LV            



                                            Volume: Normal. STRESS            



                                            FINDINGS:HR: 96/min (62%            



                                            of MPHR)BP: 116/70            



                                            mmHgPrelim. EKG: No            



                                            ischemic              



                                            changes.Symptoms:            



                                            Dyspnea (treatment not            



                                            required).Perfusion:            



                                            Normal.Wall Motion:            



                                            Normal (LVEF >70%).LV            



                                            Volume: Not            



                                            significantly changed            



                                            from rest. IMPRESSION:1.            



                                            Normal study.2. Normal            



                                            myocardial perfusion.3.            



                                            Normal resting LVEF,            



                                            which does not            



                                            deteriorate with            



                                            pharmacologic stress.4.            



                                            Normal extracardiac            



                                            tracer distribution.5.            



                                            There is no prior study            



                                            for comparison. Signed:            



                                            Lexy, Reggie MDReport            



                                            Verified Date/Time:            



                                            2022 16:05:42            



                                            Electronically signed            



                                            by: REGGIE GUDINO MD on            



                                            2022 04:05 PM            









                    BASIC METABOLIC PANEL 2022 06:01:00 









                      Test Item  Value      Reference Range Interpretation Comme

nts









             SODIUM (BEAKER) (test 140 meq/L    136-145                   



             code = 381)                                         

 

             POTASSIUM (BEAKER) 4.8 meq/L    3.5-5.1                   Specimen 

slightly hemolyzed



             (test code = 379)                                        

 

             CHLORIDE (BEAKER) (test 107 meq/L                        



             code = 382)                                         

 

             CO2 (BEAKER) (test code 21 meq/L     22-29        L            



             = 355)                                              

 

             BLOOD UREA NITROGEN 22 mg/dL     7-21         H            



             (BEAKER) (test code =                                        



             354)                                                

 

             CREATININE (BEAKER) 5.15 mg/dL   0.57-1.25    H            Specimen

 slightly hemolyzed



             (test code = 358)                                        

 

             GLUCOSE RANDOM (BEAKER) 97 mg/dL                         



             (test code = 652)                                        

 

             CALCIUM (BEAKER) (test 9.1 mg/dL    8.4-10.2                  



             code = 697)                                         

 

             EGFR (BEAKER) (test 9 mL/min/1.73 sq m                            I

nterpretation of eGFR values



             code = 1092)                                        Stage Descripti

on Result G1



                                                                 Normal or high 

 >=90 G2 Mildly



                                                                 decreased 60-89

 G3a Mildly to



                                                                 moderately 45-5

9 G3b Moderately



                                                                 to severely 30-

44 G4 Severly



                                                                 decreased 15-29

 G5 Kidney



                                                                 failure <15Repo

rted eGFR is



                                                                 based on the CK

D-EPI 



                                                                 equation that d

oes not use a



                                                                 race coefficien

tEstimated GFR is



                                                                 not as accurate

 as Creatinine



                                                                 Clearance in pr

edicting



                                                                 glomerular filt

ration rate.



                                                                 Estimated GFR i

s not applicable



                                                                 for dialysis seng miller



 ID - MARCOSpecimen slightly [id number] 05:55:08





             Test Item    Value        Reference Range Interpretation Comments

 

             MAGNESIUM (BEAKER) 1.9 mg/dL    1.6-2.6                   Specimen 

slightly



             (test code = 627)                                        hemolyzed



 ID - [id number] 05:55:08





             Test Item    Value        Reference Range Interpretation Comments

 

             PHOSPHORUS (BEAKER) 3.4 mg/dL    2.3-4.7                   Specimen

 slightly



             (test code = 604)                                        hemolyzed



 ID - MARCOCBC W/PLT COUNT &amp; AUTO [id number] 04:43:14





             Test Item    Value        Reference Range Interpretation Comments

 

             WHITE BLOOD CELL COUNT 6.7 K/ L     3.5-10.5                  



             (BEAKER) (test code =                                        



             775)                                                

 

             RED BLOOD CELL COUNT 2.94 M/ L    3.93-5.22    L            



             (BEAKER) (test code =                                        



             761)                                                

 

             HEMOGLOBIN (BEAKER) 9.1 GM/DL    11.2-15.7    L            



             (test code = 410)                                        

 

             HEMATOCRIT (BEAKER) 30.4 %       34.1-44.9    L            



             (test code = 411)                                        

 

             MEAN CORPUSCULAR 103 fL       79-95        H            Discordant 

MCV



             VOLUME (BEAKER) (test                                        result

s compared to



             code = 753)                                         previous result

s;



                                                                 clinical correl

ation



                                                                 required.

 

             MEAN CORPUSCULAR 31.0 pg      25.6-32.2                 



             HEMOGLOBIN (BEAKER)                                        



             (test code = 751)                                        

 

             MEAN CORPUSCULAR 29.9 GM/DL   32.2-35.5    L            



             HEMOGLOBIN CONC                                        



             (BEAKER) (test code =                                        



             752)                                                

 

             RED CELL DISTRIBUTION 14.2 %       11.7-14.4                 



             WIDTH (BEAKER) (test                                        



             code = 412)                                         

 

             PLATELET COUNT 84 K/CU MM   150-450      L            



             (BEAKER) (test code =                                        



             756)                                                

 

             MEAN PLATELET VOLUME 10.3 fL      9.4-12.3                  



             (BEAKER) (test code =                                        



             754)                                                

 

             NUCLEATED RED BLOOD 0 /100 WBC   0-0                       



             CELLS (BEAKER) (test                                        



             code = 413)                                         

 

             NEUTROPHILS RELATIVE 75 %                                   



             PERCENT (BEAKER) (test                                        



             code = 429)                                         

 

             LYMPHOCYTES RELATIVE 16 %                                   



             PERCENT (BEAKER) (test                                        



             code = 430)                                         

 

             MONOCYTES RELATIVE 7 %                                    



             PERCENT (BEAKER) (test                                        



             code = 431)                                         

 

             EOSINOPHILS RELATIVE 2 %                                    



             PERCENT (BEAKER) (test                                        



             code = 432)                                         

 

             BASOPHILS RELATIVE 0 %                                    



             PERCENT (BEAKER) (test                                        



             code = 437)                                         

 

             NEUTROPHILS ABSOLUTE 5.05 K/ L    1.56-6.13                 



             COUNT (BEAKER) (test                                        



             code = 670)                                         

 

             LYMPHOCYTES ABSOLUTE 1.05 K/ L    1.18-3.74    L            



             COUNT (BEAKER) (test                                        



             code = 414)                                         

 

             MONOCYTES ABSOLUTE 0.44 K/ L    0.24-0.36    H            



             COUNT (BEAKER) (test                                        



             code = 415)                                         

 

             EOSINOPHILS ABSOLUTE 0.10 K/ L    0.04-0.36                 



             COUNT (BEAKER) (test                                        



             code = 416)                                         

 

             BASOPHILS ABSOLUTE 0.02 K/ L    0.01-0.08                 



             COUNT (BEAKER) (test                                        



             code = 417)                                         

 

             IMMATURE     0.60 %       0.00-1.00                 



             GRANULOCYTES-RELATIVE                                        



             PERCENT (BEAKER) (test                                        



             code = 2801)                                        



PROTEIN ELECTROPHORESIS, SERUM WITH REFLEX TO [id number] 16:42:33





             Test Item    Value        Reference Range Interpretation Comments

 

             ALBUMIN FRACTION 3.4 gm/dL    3.5-5.5      L            



             (BEAKER) (test code =                                        



             405)                                                

 

             ALPHA 1 FRACTION 0.4 gm/dL    0.2-0.4                   



             (BEAKER) (test code =                                        



             389)                                                

 

             ALPHA 2 FRACTION 0.7 gm/dL    0.4-1.0                   



             (BEAKER) (test code =                                        



             390)                                                

 

             BETA FRACTION 0.6 gm/dL    0.5-1.1                   



             (BEAKER) (test code =                                        



             392)                                                

 

             GAMMA GLOBULIN 0.8 gm/dL    0.7-1.6                   



             FRACTION (BEAKER)                                        



             (test code = 391)                                        

 

             INTERPRETATION-119 Albumin decreased. This                         

  



             (BEAKER) (test code = may indicate protein                         

  



             2615)        malnutrition or a                           



                          protein losing state.                           

 

             [id number] Meredith Reyes, MD                           



             (BEAKER) (test code = (electronic signature)                       

    



             2616)                                               

 

             PROTEIN TOTAL SERUM, 6.0 gm/dL    6.0-8.3                   



             SPEP (BEAKER) (test                                        



             code = 2660)                                        



Clinical  - SFOperator ID - CAITLYN BOperator ID - ADMPERIPHERAL BLOOD SMEAR
- PATHOLOGIST [id number] 14:33:15





             Test Item    Value        Reference Range Interpretation Comments

 

             PERIPHERAL SMR REVIEW Cell counts confirmed.                       

    



             (BEAKER) (test code = There is macrocytic                          

 



             2640)        anemia. Platelets are                           



                          decreased with no                           



                          significant platelet                           



                          clumps or satellitism                           



                          identified.                            

 

             [id number] Amelia Ken                          

 



             (BEAKER) (test code = M.D.                                   



             1134)                                               



HEPATITIS B SURFACE [id number] 10:29:09





             Test Item    Value        Reference Range Interpretation Comments

 

             HEPATITIS B SURFACE ANTIGEN (2) Nonreactive  Nonreactive           

    



             (BEAKER) (test code = 8445)                                        



Specimen is considered negative for HBsAg.Transthoracic 2D echo w/ doppler 
(cw/pw/color)2022 08:20:07Ejection FractionSLEH ECHO HEARTLAB MKCKESSON 
[facility] (cw/pw/color)
2022 08:20:07Ejection FractionSLE ECHO [facility] (cw/pw/color)2022 
08:20:07Ejection FractionSLE ECHO [facility] w/ doppler (cw/pw/color)2022 08:20:07Ejection 
FractionSLE ECHO [facility]
Transthoracic 2D echo w/ doppler (cw/pw/color)2022 08:20:07Ejection 
[facility]
Transthoracic 2D echo w/ doppler (cw/pw/color)2022 08:20:07[facility]
[id number] 05:41:18





             Test Item    Value        Reference Range Interpretation Comments

 

             HEPATITIS C ANTIBODY (BEAKER) Nonreactive  Nonreactive             

  



             (test code = 367)                                        



 ID - EMMANUELBASIC METABOLIC [id number] 05:22:16





             Test Item    Value        Reference Range Interpretation Comments

 

             SODIUM (BEAKER) 142 meq/L    136-145                   



             (test code = 381)                                        

 

             POTASSIUM    4.5 meq/L    3.5-5.1                   



             (BEAKER) (test                                        



             code = 379)                                         

 

             CHLORIDE (BEAKER) 109 meq/L           H            



             (test code = 382)                                        

 

             CO2 (BEAKER) 21 meq/L     22-29        L            



             (test code = 355)                                        

 

             BLOOD UREA   37 mg/dL     7-21         H            



             NITROGEN (BEAKER)                                        



             (test code = 354)                                        

 

             CREATININE   8.14 mg/dL   0.57-1.25    H            



             (BEAKER) (test                                        



             code = 358)                                         

 

             GLUCOSE RANDOM 99 mg/dL                         



             (BEAKER) (test                                        



             code = 652)                                         

 

             CALCIUM (BEAKER) 9.0 mg/dL    8.4-10.2                  



             (test code = 697)                                        

 

             EGFR (BEAKER) 5                                       Interpretatio

n of eGFR



             (test code = mL/min/1.73                            values Stage De

scription



             1092)        sq m                                   Result G1 Jeanette

l or high



                                                                 >=90 G2 Mildly 

decreased



                                                                 60-89  G3a Mild

ly to



                                                                 moderately 45-5

9 G3b



                                                                 Moderately to s

everely



                                                                 30-44 G4 Severl

y decreased



                                                                 15-29 G5 Kidney

 failure



                                                                 <15Reported eGF

R is based



                                                                 on the CKD-EPI 





                                                                 equation that d

oes not use



                                                                 a race



                                                                 coefficientEsti

mated GFR



                                                                 is not as accur

ate as



                                                                 Creatinine Mable acuna in



                                                                 predicting glom

erular



                                                                 filtration rate

. Estimated



                                                                 GFR is not appl

icable for



                                                                 dialysis patien

ts



 ID - [id number] 05:21:47





             Test Item    Value        Reference Range Interpretation Comments

 

             MAGNESIUM (BEAKER) (test code = 2.0 mg/dL    1.6-2.6               

    



             627)                                                



 ID - [id number] 05:21:47





             Test Item    Value        Reference Range Interpretation Comments

 

             PHOSPHORUS (BEAKER) (test code = 4.8 mg/dL    2.3-4.7      H       

     



             604)                                                



 ID - BIJANUELCBC W/PLT COUNT &amp; AUTO [id number] 05:05:48





             Test Item    Value        Reference Range Interpretation Comments

 

             WHITE BLOOD CELL COUNT (BEAKER) 7.0 K/ L     3.5-10.5              

    



             (test code = 775)                                        

 

             RED BLOOD CELL COUNT (BEAKER) 3.00 M/ L    3.93-5.22    L          

  



             (test code = 761)                                        

 

             HEMOGLOBIN (BEAKER) (test code = 9.4 GM/DL    11.2-15.7    L       

     



             410)                                                

 

             HEMATOCRIT (BEAKER) (test code = 32.0 %       34.1-44.9    L       

     



             411)                                                

 

             MEAN CORPUSCULAR VOLUME (BEAKER) 107 fL       79-95        H       

     



             (test code = 753)                                        

 

             MEAN CORPUSCULAR HEMOGLOBIN 31.3 pg      25.6-32.2                 



             (BEAKER) (test code = 751)                                        

 

             MEAN CORPUSCULAR HEMOGLOBIN CONC 29.4 GM/DL   32.2-35.5    L       

     



             (BEAKER) (test code = 752)                                        

 

             RED CELL DISTRIBUTION WIDTH 14.4 %       11.7-14.4                 



             (BEAKER) (test code = 412)                                        

 

             PLATELET COUNT (BEAKER) (test code 84 K/CU MM   150-450      L     

       



             = 756)                                              

 

             MEAN PLATELET VOLUME (BEAKER) 10.2 fL      9.4-12.3                

  



             (test code = 754)                                        

 

             NUCLEATED RED BLOOD CELLS (BEAKER) 0 /100 WBC   0-0                

       



             (test code = 413)                                        

 

             NEUTROPHILS RELATIVE PERCENT 81 %                                  

 



             (BEAKER) (test code = 429)                                        

 

             LYMPHOCYTES RELATIVE PERCENT 12 %                                  

 



             (BEAKER) (test code = 430)                                        

 

             MONOCYTES RELATIVE PERCENT 5 %                                    



             (BEAKER) (test code = 431)                                        

 

             EOSINOPHILS RELATIVE PERCENT 1 %                                   

 



             (BEAKER) (test code = 432)                                        

 

             BASOPHILS RELATIVE PERCENT 0 %                                    



             (BEAKER) (test code = 437)                                        

 

             NEUTROPHILS ABSOLUTE COUNT 5.69 K/ L    1.56-6.13                 



             (BEAKER) (test code = 670)                                        

 

             LYMPHOCYTES ABSOLUTE COUNT 0.84 K/ L    1.18-3.74    L            



             (BEAKER) (test code = 414)                                        

 

             MONOCYTES ABSOLUTE COUNT (BEAKER) 0.35 K/ L    0.24-0.36           

      



             (test code = 415)                                        

 

             EOSINOPHILS ABSOLUTE COUNT 0.08 K/ L    0.04-0.36                 



             (BEAKER) (test code = 416)                                        

 

             BASOPHILS ABSOLUTE COUNT (BEAKER) 0.02 K/ L    0.01-0.08           

      



             (test code = 417)                                        

 

             IMMATURE GRANULOCYTES-RELATIVE 0.60 %       0.00-1.00              

   



             PERCENT (BEAKER) (test code =                                      

  



             2801)                                               



BASIC METABOLIC [id number] 05:39:02





             Test Item    Value        Reference Range Interpretation Comments

 

             SODIUM (BEAKER) 141 meq/L    136-145                   



             (test code = 381)                                        

 

             POTASSIUM    5.0 meq/L    3.5-5.1                   



             (BEAKER) (test                                        



             code = 379)                                         

 

             CHLORIDE (BEAKER) 108 meq/L           H            



             (test code = 382)                                        

 

             CO2 (BEAKER) 22 meq/L     22-29                     



             (test code = 355)                                        

 

             BLOOD UREA   25 mg/dL     7-21         H            



             NITROGEN (BEAKER)                                        



             (test code = 354)                                        

 

             CREATININE   6.82 mg/dL   0.57-1.25    H            



             (BEAKER) (test                                        



             code = 358)                                         

 

             GLUCOSE RANDOM 102 mg/dL                        



             (BEAKER) (test                                        



             code = 652)                                         

 

             CALCIUM (BEAKER) 9.3 mg/dL    8.4-10.2                  



             (test code = 697)                                        

 

             EGFR (BEAKER) 6                                       Interpretatio

n of eGFR



             (test code = mL/min/1.73                            values Stage De

scription



             1092)        sq m                                   Result G1 Jeanette

l or high



                                                                 >=90  G2 Mildly

 decreased



                                                                 60-89 G3a Mildl

y to



                                                                 moderately 45-5

9 G3b



                                                                 Moderately to s

everely



                                                                 30-44 G4 Severl

y decreased



                                                                 15-29 G5 Kidney

 failure



                                                                 <15Reported eGF

R is based



                                                                 on the CKD-EPI 





                                                                 equation that d

oes not use



                                                                 a race



                                                                 coefficientEsti

mated GFR



                                                                 is not as accur

ate as



                                                                 Creatinine Mable

kalpana in



                                                                 predicting glom

erular



                                                                 filtration rate

. Estimated



                                                                 GFR is not appl

icable for



                                                                 dialysis patien

ts



 ID Corinna VILLARREAL [id number] 05:34:03





             Test Item    Value        Reference Range Interpretation Comments

 

             MAGNESIUM (BEAKER) (test code = 1.9 mg/dL    1.6-2.6               

    



             627)                                                



 ID Corinna VILLARREAL [id number] 05:34:03





             Test Item    Value        Reference Range Interpretation Comments

 

             PHOSPHORUS (BEAKER) (test code = 5.9 mg/dL    2.3-4.7      H       

     



             604)                                                



 ID Corinna VILLARREAL WCBC W/PLT COUNT &amp; AUTO [id number] 04:51:11





             Test Item    Value        Reference Range Interpretation Comments

 

             WHITE BLOOD CELL COUNT (BEAKER) 7.5 K/ L     3.5-10.5              

    



             (test code = 775)                                        

 

             RED BLOOD CELL COUNT (BEAKER) 2.88 M/ L    3.93-5.22    L          

  



             (test code = 761)                                        

 

             HEMOGLOBIN (BEAKER) (test code = 9.2 GM/DL    11.2-15.7    L       

     



             410)                                                

 

             HEMATOCRIT (BEAKER) (test code = 30.6 %       34.1-44.9    L       

     



             411)                                                

 

             MEAN CORPUSCULAR VOLUME (BEAKER) 106 fL       79-95        H       

     



             (test code = 753)                                        

 

             MEAN CORPUSCULAR HEMOGLOBIN 31.9 pg      25.6-32.2                 



             (BEAKER) (test code = 751)                                        

 

             MEAN CORPUSCULAR HEMOGLOBIN CONC 30.1 GM/DL   32.2-35.5    L       

     



             (BEAKER) (test code = 752)                                        

 

             RED CELL DISTRIBUTION WIDTH 14.3 %       11.7-14.4                 



             (BEAKER) (test code = 412)                                        

 

             PLATELET COUNT (BEAKER) (test code 87 K/CU MM   150-450      L     

       



             = 756)                                              

 

             MEAN PLATELET VOLUME (BEAKER) 10.4 fL      9.4-12.3                

  



             (test code = 754)                                        

 

             NUCLEATED RED BLOOD CELLS (BEAKER) 0 /100 WBC   0-0                

       



             (test code = 413)                                        

 

             NEUTROPHILS RELATIVE PERCENT 77 %                                  

 



             (BEAKER) (test code = 429)                                        

 

             LYMPHOCYTES RELATIVE PERCENT 15 %                                  

 



             (BEAKER) (test code = 430)                                        

 

             MONOCYTES RELATIVE PERCENT 5 %                                    



             (BEAKER) (test code = 431)                                        

 

             EOSINOPHILS RELATIVE PERCENT 1 %                                   

 



             (BEAKER) (test code = 432)                                        

 

             BASOPHILS RELATIVE PERCENT 0 %                                    



             (BEAKER) (test code = 437)                                        

 

             NEUTROPHILS ABSOLUTE COUNT 5.82 K/ L    1.56-6.13                 



             (BEAKER) (test code = 670)                                        

 

             LYMPHOCYTES ABSOLUTE COUNT 1.13 K/ L    1.18-3.74    L            



             (BEAKER) (test code = 414)                                        

 

             MONOCYTES ABSOLUTE COUNT (BEAKER) 0.39 K/ L    0.24-0.36    H      

      



             (test code = 415)                                        

 

             EOSINOPHILS ABSOLUTE COUNT 0.10 K/ L    0.04-0.36                 



             (BEAKER) (test code = 416)                                        

 

             BASOPHILS ABSOLUTE COUNT (BEAKER) 0.03 K/ L    0.01-0.08           

      



             (test code = 417)                                        

 

             IMMATURE GRANULOCYTES-RELATIVE 0.70 %       0.00-1.00              

   



             PERCENT (BEAKER) (test code =                                      

  



             2801)                                               



HIGH SENSITIVITY TROPONIN -[id number] 18:47:20





             Test Item    Value        Reference Range Interpretation Comments

 

             HIGH SENSITIVITY 22 pg/ml     See_Comment  H             [Automated

 message]



             TROPONIN I (test code =                                        The 

system which



             8648264)                                            generated this 

result



                                                                 transmitted ref

erence



                                                                 range: <=17. Th

e



                                                                 reference range

 was



                                                                 not used to int

erpret



                                                                 this result as



                                                                 normal/abnormal

.



 ID - CAITLYN BThe  STAT High Sensitivity Troponin-I results 
should be used in conjunction with other diagnostic information such as ECG, 
clinical observations and information, and patient symptoms to aid in the 
diagnosis of MI.[id number] 18:35:03





             Test Item    Value        Reference Range Interpretation Comments

 

             POTASSIUM (BEAKER) (test code = 3.1 meq/L    3.5-5.1      L        

    



             379)                                                



 ID - CAITLYN BABAK, CHEST, 1 VIEW, NON [id number] 16:19:00Reason for 
exam:-&gt;pulm edemaShould this be performed at the bedside?-&gt;Yes
************************************************************[facility] : 1955 Sex: 
F************************************************************FINAL REPORT 
PATIENT ID: [id number] RAD, CHEST, 1 VIEW, NON DEPT INDICATION: pulm edema 
COMPARISON: None FINDINGS: Portable frontal view of the chest. IMPRESSION: 
Support Lines: Dialysis catheter tip overlies the right atrium. Lungs and 
pleura: Lungs are hypoinflated but otherwise unremarkable. No significant 
pulmonary edema or pleural effusions. No significant pneumothorax. Heart and 
mediastinum: Normal contours. Additional findings: None. Signed: Olga Greer Verified Date/Time: 2022 16:19:33 Electronically signed by: OLGA GREER MD on 2022 04:19 [id number] 16:00:35





             Test Item    Value        Reference Range Interpretation Comments

 

             THYROID STIMULATING HORMONE 1.121 uIU/mL 0.350-4.940               



             (BEAKER) (test code = 772)                                        



 ID - CAITLYN BHIGH SENSITIVITY TROPONIN -[id number] 15:45:53





             Test Item    Value        Reference Range Interpretation Comments

 

             HIGH SENSITIVITY 23 pg/ml     See_Comment  H             [Automated

 message]



             TROPONIN I (test code =                                        The 

system which



             [id number])                                            generated this 

result



                                                                 transmitted ref

erence



                                                                 range: <=17. Th

e



                                                                 reference range

 was



                                                                 not used to int

erpret



                                                                 this result as



                                                                 normal/abnormal

.



 ID - CAITLYN BThe  STAT High Sensitivity Troponin-I results 
should be used in conjunction with other diagnostic information such as ECG, 
clinical observations and information, and patient symptoms to aid in the 
diagnosis of MI.B-TYPE NATRIURETIC FACTOR (BNP)2022 15:45:53





             Test Item    Value        Reference Range Interpretation Comments

 

             B-TYPE NATRIURETIC PEPTIDE (BEAKER) 395 pg/mL    0-100        H    

        



             (test code = 700)                                        



 ID - CAITLYN BCOMPREHENSIVE METABOLIC [id number] 15:42:17





             Test Item    Value        Reference Range Interpretation Comments

 

             TOTAL PROTEIN 6.2 gm/dL    6.0-8.3                   



             (BEAKER) (test                                        



             code = 770)                                         

 

             ALBUMIN (BEAKER) 3.5 g/dL     3.5-5.0                   



             (test code = 1145)                                        

 

             ALKALINE     109 U/L                          



             PHOSPHATASE                                         



             (BEAKER) (test                                        



             code = 346)                                         

 

             BILIRUBIN TOTAL 1.2 mg/dL    0.2-1.2                   



             (BEAKER) (test                                        



             code = 377)                                         

 

             SODIUM (BEAKER) 141 meq/L    136-145                   



             (test code = 381)                                        

 

             POTASSIUM (BEAKER) 3.1 meq/L    3.5-5.1      L            



             (test code = 379)                                        

 

             CHLORIDE (BEAKER) 105 meq/L                        



             (test code = 382)                                        

 

             CO2 (BEAKER) (test 25 meq/L     22-29                     



             code = 355)                                         

 

             BLOOD UREA   19 mg/dL     7-21                      



             NITROGEN (BEAKER)                                        



             (test code = 354)                                        

 

             CREATININE   5.92 mg/dL   0.57-1.25    H            



             (BEAKER) (test                                        



             code = 358)                                         

 

             GLUCOSE RANDOM 109 mg/dL           H            



             (BEAKER) (test                                        



             code = 652)                                         

 

             CALCIUM (BEAKER) 9.0 mg/dL    8.4-10.2                  



             (test code = 697)                                        

 

             AST (SGOT)   11 U/L       5-34                      



             (BEAKER) (test                                        



             code = 353)                                         

 

             ALT (SGPT)   11 U/L       6-55                      



             (BEAKER) (test                                        



             code = 347)                                         

 

             EGFR (BEAKER) 7                                       Interpretatio

n of eGFR



             (test code = 1092) mL/min/1.73                            values St

age Description



                          sq m                                   Result G1 Jeanette

l or high



                                                                 >=90 G2 Mildly 

decreased



                                                                 60-89 G3a Mildl

y to



                                                                 moderately 45-5

9 G3b



                                                                 Moderately to s

everely



                                                                 30-44 G4 Severl

y decreased



                                                                 15-29 G5 Kidney

 failure



                                                                 <15Reported eGF

R is based



                                                                 on the CKD-EPI 

202



                                                                 equation that d

oes not use



                                                                 a race



                                                                 coefficientEsti

mated GFR



                                                                 is not as accur

ate as



                                                                 Creatinine Mable

kalpana in



                                                                 predicting glom

erular



                                                                 filtration rate

. Estimated



                                                                 GFR is not appl

icable for



                                                                 dialysis patien

ts



 ID - CAITLYN [id number] 15:39:33





             Test Item    Value        Reference Range Interpretation Comments

 

             MAGNESIUM (BEAKER) (test code = 1.9 mg/dL    1.6-2.6               

    



             627)                                                



 ID - CAITLYN [id number] 15:39:33





             Test Item    Value        Reference Range Interpretation Comments

 

             PHOSPHORUS (BEAKER) (test code = 5.0 mg/dL    2.3-4.7      H       

     



             604)                                                



 ID - CAITLYN BCBC W/PLT COUNT &amp; AUTO [id number] 15:23:08





             Test Item    Value        Reference Range Interpretation Comments

 

             WHITE BLOOD CELL COUNT (BEAKER) 5.6 K/ L     3.5-10.5              

    



             (test code = 775)                                        

 

             RED BLOOD CELL COUNT (BEAKER) 2.85 M/ L    3.93-5.22    L          

  



             (test code = 761)                                        

 

             HEMOGLOBIN (BEAKER) (test code = 8.9 GM/DL    11.2-15.7    L       

     



             410)                                                

 

             HEMATOCRIT (BEAKER) (test code = 29.9 %       34.1-44.9    L       

     



             411)                                                

 

             MEAN CORPUSCULAR VOLUME (BEAKER) 105 fL       79-95        H       

     



             (test code = 753)                                        

 

             MEAN CORPUSCULAR HEMOGLOBIN 31.2 pg      25.6-32.2                 



             (BEAKER) (test code = 751)                                        

 

             MEAN CORPUSCULAR HEMOGLOBIN CONC 29.8 GM/DL   32.2-35.5    L       

     



             (BEAKER) (test code = 752)                                        

 

             RED CELL DISTRIBUTION WIDTH 14.0 %       11.7-14.4                 



             (BEAKER) (test code = 412)                                        

 

             PLATELET COUNT (BEAKER) (test code 67 K/CU MM   150-450      L     

       



             = 756)                                              

 

             MEAN PLATELET VOLUME (BEAKER) 9.6 fL       9.4-12.3                

  



             (test code = 754)                                        

 

             NUCLEATED RED BLOOD CELLS (BEAKER) 0 /100 WBC   0-0                

       



             (test code = 413)                                        

 

             NEUTROPHILS RELATIVE PERCENT 76 %                                  

 



             (BEAKER) (test code = 429)                                        

 

             LYMPHOCYTES RELATIVE PERCENT 15 %                                  

 



             (BEAKER) (test code = 430)                                        

 

             MONOCYTES RELATIVE PERCENT 7 %                                    



             (BEAKER) (test code = 431)                                        

 

             EOSINOPHILS RELATIVE PERCENT 1 %                                   

 



             (BEAKER) (test code = 432)                                        

 

             BASOPHILS RELATIVE PERCENT 0 %                                    



             (BEAKER) (test code = 437)                                        

 

             NEUTROPHILS ABSOLUTE COUNT 4.27 K/ L    1.56-6.13                 



             (BEAKER) (test code = 670)                                        

 

             LYMPHOCYTES ABSOLUTE COUNT 0.86 K/ L    1.18-3.74    L            



             (BEAKER) (test code = 414)                                        

 

             MONOCYTES ABSOLUTE COUNT (BEAKER) 0.37 K/ L    0.24-0.36    H      

      



             (test code = 415)                                        

 

             EOSINOPHILS ABSOLUTE COUNT 0.05 K/ L    0.04-0.36                 



             (BEAKER) (test code = 416)                                        

 

             BASOPHILS ABSOLUTE COUNT (BEAKER) 0.02 K/ L    0.01-0.08           

      



             (test code = 417)                                        

 

             IMMATURE GRANULOCYTES-RELATIVE 0.50 %       0.00-1.00              

   



             PERCENT (BEAKER) (test code =                                      

  



             2801)                                               



ECG 12 [id number] 22:49:39





             Test Item    Value        Reference Range Interpretation Comments

 

             Ventricular rate (test                                        



             code = 253)                                         

 

             QRSD interval (test                                        



             code = 260)                                         

 

             QT interval (test code                                        



             = 264)                                              

 

             QTC interval (test code                                        



             = 265)                                              

 

             QRS axis 1 (test code =                                        



             268)                                                

 

             T wave axis (test code                                        



             = 270)                                              

 

             EKG impression (test Atrial                                 



             code = 273)  fibrillation-Rightward                           



                          axis-ST & T wave                           



                          abnormality, consider                           



                          inferior                               



                          ischemia-Abnormal                           



                          ECG-In automated                           



                          comparison with ECG of                           



                          2022 02:20,-QRS                           



                          axis shifted                           



                          right-Electronically                           



                          Signed By Neftali Subramanian MD () on                           



                          2022 4:49:37 PM                           



[address]2022-11-06 22:49:39





             Test Item    Value        Reference Range Interpretation Comments

 

             Ventricular rate (test                                        



             code = 253)                                         

 

             QRSD interval (test                                        



             code = 260)                                         

 

             QT interval (test code                                        



             = 264)                                              

 

             QTC interval (test code                                        



             = 265)                                              

 

             QRS axis 1 (test code =                                        



             268)                                                

 

             T wave axis (test code                                        



             = 270)                                              

 

             EKG impression (test Atrial                                 



             code = 273)  fibrillation-Rightward                           



                          axis-ST & T wave                           



                          abnormality, consider                           



                          inferior                               



                          ischemia-Abnormal                           



                          ECG-In automated                           



                          comparison with ECG of                           



                          2022 02:20,-QRS                           



                          axis shifted                           



                          right-Electronically                           



                          Signed By Neftali Subramanian MD () on                           



                          2022 4:49:37 PM                           



[address]2022-11-06 22:49:39





             Test Item    Value        Reference Range Interpretation Comments

 

             Ventricular rate (test                                        



             code = 253)                                         

 

             QRSD interval (test                                        



             code = 260)                                         

 

             QT interval (test code                                        



             = 264)                                              

 

             QTC interval (test code                                        



             = 265)                                              

 

             QRS axis 1 (test code =                                        



             268)                                                

 

             T wave axis (test code                                        



             = 270)                                              

 

             EKG impression (test Atrial                                 



             code = 273)  fibrillation-Rightward                           



                          axis-ST & T wave                           



                          abnormality, consider                           



                          inferior                               



                          ischemia-Abnormal                           



                          ECG-In automated                           



                          comparison with ECG of                           



                          2022 02:20,-QRS                           



                          axis shifted                           



                          right-Electronically                           



                          Signed By Neftali Subramanian MD () on                           



                          2022 4:49:37 PM                           



[facility] in Respiratory specimen 
by EDWARD with probe [id number] 04:29:38





             Test Item    Value        Reference Range Interpretation Comments

 

             SARS-CoV-2 (COVID-19) RNA Not detected                           



             [Presence] in Respiratory                                        



             specimen by EDWARD with probe                                        



             detection (test code = 62245-7)                                    

    

 

             Whether patient is employed in a Unknown                           

     



             healthcare setting (test code =                                    

    



             21718-8)                                            

 

             Whether the patient has symptoms Unknown                           

     



             related to condition of interest                                   

     



             (test code = 74918-0)                                        

 

             Whether the patient was Unknown                                



             hospitalized for condition of                                      

  



             interest (test code = 03038-5)                                     

   

 

             Whether the patient was admitted Unknown                           

     



             to intensive care unit (ICU) for                                   

     



             condition of interest (test code                                   

     



             = 22159-6)                                          

 

             Whether patient resides in a Unknown                               

 



             congregate care setting (test                                      

  



             code = 72777-3)                                        

 

             Pregnancy status (test code = Unknown                              

  



             58174-9)                                            

 

             Date and time of symptom onset Unknown                             

   



             (test code = 50620-6)                                        



[facility] ED Preliminary Interpretation - Not an 
[id number] 03:37:43





             Test Item    Value        Reference Range Interpretation Comments

 

             SUGEY (test code = SUGEY) Suresh Pineda MD 2022 3:33                           



                          [facility] ED Preliminary                           



                          Interpretation - Not                           



                          an OrderPerformed by:                           



                          Suresh Pineda MDAuthorized by:                           



                          Suresh Pineda MD ECG reviewed by ED                           



                          Physician in the                           



                          absence of a                           



                          cardiologist: yes                           



                          Interpretation:                           



                          Interpretation:                           



                          abnormal Quality:                           



                          Tracing quality:                           



                          Limited by                             



                          artifactRate: ECG                           



                          rate: 94 ECG rate                           



                          assessment: normal                           



                          Rhythm: Rhythm: atrial                           



                          fibrillation Ectopy:                           



                          Ectopy: none QRS: QRS                           



                          axis: Right QRS                           



                          intervals:                             



                          NormalConduction:                           



                          Conduction: normal ST                           



                          segments: ST segments:                           



                          NormalT waves: T                           



                          waves: non-specific                           



                          Comments: QRS 82. QTc                           



                          420.                                   

 

             Lab Interpretation Abnormal                               



             (test code = 89810-8)                                        



[facility] ED Preliminary Interpretation - Not an [id number] 
03:37:43





             Test Item    Value        Reference Range Interpretation Comments

 

             SUGEY (test code = SUGEY) Suresh Pineda MD 2022 3:33                           



                          [facility] ED Preliminary                           



                          Interpretation - Not                           



                          an OrderPerformed by:                           



                          Suresh Pineda MDAuthorized by:                           



                          Suresh Pineda MD ECG reviewed by ED                           



                          Physician in the                           



                          absence of a                           



                          cardiologist: yes                           



                          Interpretation:                           



                          Interpretation:                           



                          abnormal Quality:                           



                          Tracing quality:                           



                          Limited by                             



                          artifactRate: ECG                           



                          rate: 94 ECG rate                           



                          assessment: normal                           



                          Rhythm: Rhythm: atrial                           



                          fibrillation Ectopy:                           



                          Ectopy: none QRS: QRS                           



                          axis: Right QRS                           



                          intervals:                             



                          NormalConduction:                           



                          Conduction: normal ST                           



                          segments: ST segments:                           



                          NormalT waves: T                           



                          waves: non-specific                           



                          Comments: QRS 82. QTc                           



                          420.                                   

 

             Lab Interpretation Abnormal                               



             (test code = 50771-1)                                        



[id number]





             Test Item    Value        Reference    Interpretation Comments



                                       Range                     

 

             PTH (test code = 146 pg/mL    16-77        H             Interpreti

ve Guide Intact



             2731-8)                                             PTH



                                                                 Calcium--------

----------



                                                                 ---------- ----

---Normal



                                                                 Parathyroid Nor

mal



                                                                 NormalHypoparat

hyroidism



                                                                 Low or Low Norm

al



                                                                 LowHyperparathy

roidism



                                                                 Primary  Normal

 or High



                                                                 High Secondary 

High Normal



                                                                 or Low Tertiary

 High



                                                                 HighNon-Parathy

roid



                                                                 Hypercalcemia L

ow or Low



                                                                 Normal High

 

             SUGEY (test code = FASTING:NO                             



             SUGEY)         FASTING: NO                            

 

             RAC (test code = Performing                             



             RAC)         Organization                           



                          Information:                           



                          Site ID: [facility]                           



                          Name: [facility]                           



                          [facility]                               



                          Address: [address] [place] [address]                            



                          [id number]                             



                          Director:                              



                          Vignesh Felix                           

 

             Lab          Abnormal                               



             Interpretation                                        



             (test code =                                        



             84092-1)                                            



[facility] [id number] 17:49:00





             Test Item    Value        Reference Range Interpretation Comments

 

             TSH (test                 See_Comment                [Automated mes

zia]



             code =                                              The system whic

h



             3016-3)                                             generated this



                                                                 result transmit

kolby



                                                                 reference range

:



                                                                 0.40 - 4.50 mIU

/L.



                                                                 The reference r

cheyenne



                                                                 was not used to



                                                                 interpret this



                                                                 result as



                                                                 normal/abnormal

.

 

             SUGEY (test    FASTING:NO FASTING:                           



             code = SUGEY)  NO                                     

 

             RAC (test    Performing                             



             code = RAC)  Organization                           



                          Information: Site ID:                           



                          [facility]                           



                          Lab Address: [address] [place] [address]                            



                          [address] Director:                           



                          [facility] [id number]:49:00





             Test Item    Value        Reference Range Interpretation Comments

 

             Vitamin D,   82 ng/mL                         Vitamin D Statu

s



             25-hydroxy (test                                        25-OH Vitam

in D:



             code = 1989-3)                                        Deficiency: <

20



                                                                 ng/mLInsufficie

ncy



                                                                 : 20 - 29



                                                                 ng/mLOptimal: >

 or



                                                                 = 30 ng/mL For



                                                                 25-OH Vitamin D



                                                                 testing on



                                                                 patients on



                                                                 D2-supplementat

ion



                                                                 and patients fo

r



                                                                 whom quantitati

on



                                                                 of D2 and D3



                                                                 fractions is



                                                                 required, the



                                                                 QuestAssureD(TM

)25



                                                                 -OH VIT D,



                                                                 (D2,D3), LC/MS/

MS



                                                                 is recommended:



                                                                 order code 9288

8



                                                                 (patients



                                                                 >2yrs).See Note

 1



                                                                 Note 1 For



                                                                 additional



                                                                 information,



                                                                 please refer to



                                                                 http://educatio

n.Q



                                                                 [facility]

.co



                                                                 m/faq/GRM555 (T

his



                                                                 link is being



                                                                 provided for



                                                                 informational/e

radu



                                                                 ational purpose

s



                                                                 only.)

 

             SUGEY (test code = FASTING:NO FASTING:                           



             SUGEY)         NO                                     

 

             RAC (test code = Performing                             



             RAC)         Organization                           



                          Information: Site                           



                          ID: [facility]                           



                          Lab Address: [address] [place] [address]                            



                          [id number] Director:                           



                          Mesa DENNIS                               



                          [id number] 17:49:00





             Test Item    Value        Reference    Interpretation Comments



                                       Range                     

 

             PTH (test code = 146 pg/mL    16-77        H             Interpreti

ve Guide Intact



             2731-8)                                             PTH



                                                                 Calcium--------

----------



                                                                 ----------  ---

----Normal



                                                                 Parathyroid Nor

mal



                                                                 NormalHypoparat

hyroidism



                                                                 Low or Low Norm

al



                                                                 LowHyperparathy

roidism



                                                                 Primary Normal 

or High



                                                                 High Secondary 

 High



                                                                 Normal or Low T

ertiary



                                                                 High HighNon-Pa

rathyroid



                                                                 Hypercalcemia L

ow or Low



                                                                 Normal High

 

             SUGEY (test code = FASTING:NO                             



             SUGEY)         FASTING: NO                            

 

             RAC (test code = Performing                             



             RAC)         Organization                           



                          Information:                           



                          Site ID: [facility]                           



                          [facility]                           



                          sto Lab                               



                          Address: [address] [place] [address]                            



                          [id number]                             



                          Director:                              



                          Vignesh Felix                           

 

             Lab          Abnormal                               



             Interpretation                                        



             (test code =                                        



             02204-4)                                            



[facility] [id number] 17:49:00





             Test Item    Value        Reference Range Interpretation Comments

 

             TSH (test                 See_Comment                [Automated mes

zia]



             code =                                              The system whic

h



             3016-3)                                             generated this



                                                                 result transmit

kolby



                                                                 reference range

:



                                                                 0.40 - 4.50 mIU

/L.



                                                                 The reference r

cheyenne



                                                                 was not used to



                                                                 interpret this



                                                                 result as



                                                                 normal/abnormal

.

 

             SUGEY (test    FASTING:NO FASTING:                           



             code = SUGEY)  NO                                     

 

             RAC (test    Performing                             



             code = RAC)  Organization                           



                          Information: Site ID:                           



                          [facility] Name: [facility]                           



                          Lab Address: [address] [place] [address]                            



                          [id number] Director:                           



                          [facility]Hol[facility] [id number] 17:49:00





             Test Item    Value        Reference Range Interpretation Comments

 

             Vitamin D,   82 ng/mL                         Vitamin D Statu

s



             25-hydroxy (test                                        25-OH Vitam

in D:



             code = 1989-3)                                        Deficiency: <

20



                                                                 ng/mLInsufficie

ncy



                                                                 : 20 - 29



                                                                 ng/mLOptimal: >

 or



                                                                 = 30 ng/mL For



                                                                 25-OH Vitamin D



                                                                 testing on



                                                                 patients on



                                                                 D2-supplementat

ion



                                                                 and patients fo

r



                                                                 whom quantitati

on



                                                                 of D2 and D3



                                                                 fractions is



                                                                 required, the



                                                                 QuestAssureD(TM

)25



                                                                 -OH VIT D,



                                                                 (D2,D3), LC/MS/

MS



                                                                 is recommended:



                                                                 order code 9288

8



                                                                 (patients



                                                                 >2yrs).See Note

 1



                                                                 Note 1 For



                                                                 additional



                                                                 information,



                                                                 please refer to



                                                                 http://educatio

n.Q



                                                                 [facility]

.co



                                                                 m/faq/GMO994 (T

his



                                                                 link is being



                                                                 provided for



                                                                 informational/e

radu



                                                                 ational purpose

s



                                                                 only.)

 

             SUGYE (test code = FASTING:NO FASTING:                           



             SUGEY)         NO                                     

 

             RAC (test code = Performing                             



             RAC)         Organization                           



                          Information: Site                           



                          ID: [facility] Name[facility]                           



                          Lab Address: [address] [place] [address]                            



                          [address] Director:                           



                          Vignesh Felix                           



[facility] [id number] 17:49:00





             Test Item    Value        Reference    Interpretation Comments



                                       Range                     

 

             PTH (test code = 146 pg/mL    16-77        H             Interpreti

ve Guide Intact



             2731-8)                                             PTH



                                                                 Calcium--------

----------



                                                                 ---------- ----

---Normal



                                                                 Parathyroid Nor

mal



                                                                 NormalHypoparat

hyroidism



                                                                 Low or Low Norm

al



                                                                 LowHyperparathy

roidism



                                                                 Primary Normal 

or High



                                                                 High Secondary 

High Normal



                                                                 or Low Tertiary

 High



                                                                 HighNon-Parathy

roid



                                                                 Hypercalcemia L

ow or Low



                                                                 Normal High

 

             SUGEY (test code = FASTING:NO                             



             SUGEY)         FASTING: NO                            

 

             RAC (test code = Performing                             



             RAC)         Organization                           



                          Information:                           



                          Site ID: [facility]                           



                          [facility]                               



                          Address: [address] [place] [address]                            



                          [phone]                             



                          Director:                              



                          Vignesh Felix                           

 

             Lab          Abnormal                               



             Interpretation                                        



             (test code =                                        



             13117-7)                                            



[facility] Center[facility] [id number] 17:49:00





             Test Item    Value        Reference Range Interpretation Comments

 

             TSH (test                 See_Comment                [Automated mes

zia]



             code =                                              The system whic

h



             3016-3)                                             generated this



                                                                 result transmit

kolby



                                                                 reference range

:



                                                                 0.40 - 4.50 mIU

/L.



                                                                 The reference r

cheyenne



                                                                 was not used to



                                                                 interpret this



                                                                 result as



                                                                 normal/abnormal

.

 

             SUGEY (test    FASTING:NO FASTING:                           



             code = SGUEY)  NO                                     

 

             RAC (test    Performing                             



             code = RAC)  Organization                           



                          Information: Site ID:                           



                          [facility]                           



                          Lab Address: [address] [place] [address]                            



                          [address] Director:                           



                          Vignesh Felix                           



[facility] hydroxy [id number] 17:49:00





             Test Item    Value        Reference Range Interpretation Comments

 

             Vitamin D,   82 ng/mL                         Vitamin D Statu

s



             25-hydroxy (test                                        25-OH Vitam

in D:



             code = 1989-3)                                        Deficiency:  

<20



                                                                 ng/mLInsufficie

ncy



                                                                 : 20 - 29



                                                                 ng/mLOptimal: >

 or



                                                                 = 30 ng/mL For



                                                                 25-OH Vitamin D



                                                                 testing on



                                                                 patients on



                                                                 D2-supplementat

ion



                                                                 and patients fo

r



                                                                 whom quantitati

on



                                                                 of D2 and D3



                                                                 fractions is



                                                                 required, the



                                                                 QuestAssureD(TM

)25



                                                                 -OH VIT D,



                                                                 (D2,D3), LC/MS/

MS



                                                                 is recommended:



                                                                 order code 9288

8



                                                                 (patients



                                                                 >2yrs).See Note

 1



                                                                 Note 1 For



                                                                 additional



                                                                 information,



                                                                 please refer to



                                                                 http://educatio

n.Q



                                                                 [facility]

.co



                                                                 m/faq/DJY335 (T

his



                                                                 link is being



                                                                 provided for



                                                                 informational/e

radu



                                                                 ational purpose

s



                                                                 only.)

 

             USGEY (test code = FASTING:NO FASTING:                           



             SUGEY)         NO                                     

 

             RAC (test code = Performing                             



             RAC)         Organization                           



                          Information: Site                           



                          ID: [facility]                           



                          Lab Address: [address] [place] [address]                            



                          [address] Director:                           



                          Vignesh Felix                           



[facility]2022-09-07 04:51:00





             Test Item    Value        Reference Range Interpretation Comments

 

             POC glucose (test code = 129 mg/dL    65-99        H            Ope

rator Name:



             [id number])                                            Jayeshhentrentga



                                                                 [place]

ce



                                                                 ID:



                                                                 [id number]~Luzmaria

table



                                                                 : HMW Notified 

RN

 

             Lab Interpretation (test Abnormal                               



             code = 10634-1)                                        



[facility]2022-09-07 04:51:00





             Test Item    Value        Reference Range Interpretation Comments

 

             POC glucose (test code = 129 mg/dL    65-99        H            Ope

rator Name:



             [id number])                                            Jayeshhentrentga



                                                                 [place]

ce



                                                                 ID:



                                                                 [id number]~Luzmaria

table



                                                                 : HMW Notified 

RN

 

             Lab Interpretation (test Abnormal                               



             code = 78355-8)                                        



[facility]2022-09-07 04:51:00





             Test Item    Value        Reference Range Interpretation Comments

 

             POC glucose (test code = 129 mg/dL    65-99        H            Ope

rator Name:



             [id number])                                            [facility]



                                                                 [place]

ce



                                                                 ID:



                                                                 [id number]~Luzmaria

table



                                                                 : HMW Notified 

RN

 

             Lab Interpretation (test Abnormal                               



             code = 53970-1)                                        



[facility], (w Contrast, Strain and
3D if needed)2022 14:28:07





             Test Item    Value        Reference    Interpretation Comments



                                       Range                     

 

             RA pressure (test              mmHg                      



             code = 4462648114)                                        

 

             EF (test code = 50 %         54-74        A            



             5316882687)                                         

 

             IVS,d (test code = 0.92 cm      0.6-0.9      A            



             3237575066)                                         

 

             IVS s 2D (test code 1.08 cm                                



             = 5041015740)                                        

 

             LVPWD,d (test code = 0.93 cm      0.60-1.19                 



             9106443226)                                         

 

             LVPW s PLAX (test 1.13 cm                                



             code = 6929599396)                                        

 

             LV,s (test code = 2.94 cm                                



             7041090913)                                         

 

             LVOT Diam,S (test 1.98 cm                                



             code = 6144043218)                                        

 

             LV PEREZ VOL (test 66.04 ml                         



             code = 6687024607)                                        

 

             LV SYS VOL (test 33.32 ml     14-42                     



             code = 5759939706)                                        

 

             MV Peak E Adama (test 1.61 m/s                               



             code = 6990260119)                                        

 

             MV Peak A Adama (test 0.58 m/s                               



             code = 4834104365)                                        

 

             E/A ratio (test code              See_Comment  A             [Autom

ated



             = 2592763393)                                        message] The



                                                                 system which



                                                                 generated this



                                                                 result



                                                                 transmitted



                                                                 reference range

:



                                                                 <=0.8. The



                                                                 reference range



                                                                 was not used to



                                                                 interpret this



                                                                 result as



                                                                 normal/abnormal

.

 

             E wave decelartion              See_Comment  A             [Automat

ed



             time (test code =                                        message] T

he



             2891082891)                                         system which



                                                                 generated this



                                                                 result



                                                                 transmitted



                                                                 reference range

:



                                                                 200 msec. The



                                                                 reference range



                                                                 was not used to



                                                                 interpret this



                                                                 result as



                                                                 normal/abnormal

.

 

             LV,d (test code = 3.90 cm                                



             1019025301)                                         

 

             IVS/LVPW,2D (test                                        



             code = 5928303897)                                        

 

             LV EF,2D (test code 57.26 %                                



             = 2027584458)                                        

 

             LV FS Cube 2D (test                                        



             code = 3617013922)                                        

 

             LV FS Teich 2D (test                                        



             code = 1127728274)                                        

 

             LV SV Teich 2D (test 32.73 ml                               



             code = 3024829657)                                        

 

             LV Vol s Teich PSAX 33.32 ml                               



             (test code =                                        



             2202972357)                                         

 

             LVOT stroke volume 0.46 cm3                               



             (test code =                                        



             7123269625)                                         

 

             Left Atrium  5.30 cm      See_Comment  A             [Automated



             Dimension Anterior                                        message] 

The



             (test code =                                        system which



             1981502611)                                         generated this



                                                                 result



                                                                 transmitted



                                                                 reference range

:



                                                                 <=3.8. The



                                                                 reference range



                                                                 was not used to



                                                                 interpret this



                                                                 result as



                                                                 normal/abnormal

.

 

             LA Vol MOD A4C (test 130.09 ml                              



             code = 0375618468)                                        

 

             LA area s A4C (test 36.52 cm2                              



             code = 2652275365)                                        

 

             LVOT area (test code 3.08 cm2                               



             = 5869018600)                                        

 

             LVOT Vmax (test code 0.85 m/s                               



             = 2373833786)                                        

 

             AoV Mean PG (test              See_Comment                [Automate

d



             code = 4787031498)                                        message] 

The



                                                                 system which



                                                                 generated this



                                                                 result



                                                                 transmitted



                                                                 reference range

:



                                                                 20 mmHg. The



                                                                 reference range



                                                                 was not used to



                                                                 interpret this



                                                                 result as



                                                                 normal/abnormal

.

 

             AoV Peak PG (test              mmHg                      



             code = 9985123549)                                        

 

             AV LVOT peak              mmHg                      



             gradient (test code                                        



             = 6001480318)                                        

 

             AoV Area, Vmax (test 1.94 cm2     See_Comment                [Autom

ated



             code = 7432874138)                                        message] 

The



                                                                 system which



                                                                 generated this



                                                                 result



                                                                 transmitted



                                                                 reference range

:



                                                                 >=1.5. The



                                                                 reference range



                                                                 was not used to



                                                                 interpret this



                                                                 result as



                                                                 normal/abnormal

.

 

             LVOT VTI (CM) (test 15.00 cm                               



             code = 5357919991)                                        

 

             AoV Vmax (test code 1.35 m/s                               



             = 7354055740)                                        

 

             AoV Vmn (test code = 0.82 m/s                               



             8352880498)                                         

 

             AoV Area, VTI (test 2.30 cm2                               



             code = 5237820067)                                        

 

             LVOT CO (test code = 4.72 l/min                             



             5548615008)                                         

 

             LVOT HR for LVOT CO              bpm                       



             (test code =                                        



             9461874430)                                         

 

             Velocity Ratio 0.63 m/s                               



             (V1/V2) (test code =                                        



             4689)                                               

 

             MV mean gradient              See_Comment                [Automated



             (test code =                                        message] The



             3909525587)                                         system which



                                                                 generated this



                                                                 result



                                                                 transmitted



                                                                 reference range

:



                                                                 5 mmHg. The



                                                                 reference range



                                                                 was not used to



                                                                 interpret this



                                                                 result as



                                                                 normal/abnormal

.

 

             MR peak grad (test              mmHg                      



             code = 9258492961)                                        

 

             MV stenosis pressure 30.12 ms     See_Comment                [Autom

ated



             1/2 time (test code                                        message]

 The



             = 2489952336)                                        system which



                                                                 generated this



                                                                 result



                                                                 transmitted



                                                                 reference range

:



                                                                 <=150. The



                                                                 reference range



                                                                 was not used to



                                                                 interpret this



                                                                 result as



                                                                 normal/abnormal

.

 

             MV E A ratio (test                                        



             code = 9639386974)                                        

 

             MV valve area p 1/2 7.30 cm2                               



             method (test code =                                        



             6667927263)                                         

 

             MV VTI Tips (test 0.15 m                                 



             code = 1515047065)                                        

 

             MV Vmax (test code = 0.72 m                                 



             8496721932)                                         

 

             RVSP (test code =              See_Comment  A             [Automate

d



             3181333774)                                         message] The



                                                                 system which



                                                                 generated this



                                                                 result



                                                                 transmitted



                                                                 reference range

:



                                                                 40.00 mmHg. The



                                                                 reference range



                                                                 was not used to



                                                                 interpret this



                                                                 result as



                                                                 normal/abnormal

.

 

             TR pk grad (test              mmHg                      



             code = 1667458363)                                        

 

             TR Vpeak (test code 2.85 m/s                               



             = 4202862877)                                        

 

             RVSP (TR) (test code              mmHg                      



             = 0966847909)                                        

 

             RVOT Vmax (test code 0.44 m/s                               



             = 4961997501)                                        

 

             PV Mean Grad (test              mmHg                      



             code = 2972205863)                                        

 

             PV Pk Grad (test              See_Comment                [Automated



             code = 6698405612)                                        message] 

The



                                                                 system which



                                                                 generated this



                                                                 result



                                                                 transmitted



                                                                 reference range

:



                                                                 36 mmHg. The



                                                                 reference range



                                                                 was not used to



                                                                 interpret this



                                                                 result as



                                                                 normal/abnormal

.

 

             PV VTI (test code = 0.17 m                                 



             1150991802)                                         

 

             RVOT pk grad (test              mmHg                      



             code = 6151444304)                                        

 

             PV VMAX (test code = 1.05 m/s     See_Comment                [Autom

ated



             3917006421)                                         message] The



                                                                 system which



                                                                 generated this



                                                                 result



                                                                 transmitted



                                                                 reference range

:



                                                                 <=3. The



                                                                 reference range



                                                                 was not used to



                                                                 interpret this



                                                                 result as



                                                                 normal/abnormal

.

 

             PV Vmn (test code =                                        



             3438656682)                                         

 

             Ao Root Diameter 2.95 cm      See_Comment                [Automated



             (test code =                                        message] The



             9742717511)                                         system which



                                                                 generated this



                                                                 result



                                                                 transmitted



                                                                 reference range

:



                                                                 <=3.99. The



                                                                 reference range



                                                                 was not used to



                                                                 interpret this



                                                                 result as



                                                                 normal/abnormal

.

 

             Ao Root Diameter 2.95 cm                                



             (test code =                                        



             1283974727)                                         

 

             Ascending aorta 2.58 cm                                



             (test code =                                        



             5248794250)                                         

 

             Pred METS R1 (test                                        



             code = 4137814206)                                        

 

             Pred Exer Dur R1                                        



             (test code =                                        



             5411173310)                                         

 

             MV Decel slope (test 15.54 m/s2                             



             code = 0418523434)                                        

 

             LVPW pct thck PLAX 20.67 %                                



             (test code =                                        



             0080399281)                                         

 

             LV vol s cube 2D 25.42 ml                               



             (test code =                                        



             9233260821)                                         

 

             LV vol d cube 2D 59.47 ml                               



             (test code =                                        



             5628486900)                                         

 

             LV SV Cube 2D (test 34.05 ml                               



             code = 3340764982)                                        

 

             IVS pct thck PLAX 17.04 %                                



             (test code =                                        



             4508672080)                                         

 

             Calc MPHR (test code              bpm                       



             = 4806874422)                                        

 

             85 of MPHR (test                                        



             code = 0471935106)                                        

 

             RVOT VTI (test code 0.08 m                                 



             = 1913553996)                                        

 

             RVOT Vmn (test code 0.30 m/s                               



             = 1521951335)                                        

 

             RVOT mean grad (test              mmHg                      



             code = 5032829994)                                        

 

             MAX Pred HR (test                                        



             code = 1472789697)                                        

 

             LVOT mean grad (test              mmHg                      



             code = 7180370823)                                        

 

             Aov area Vmn (test 2.31 cm2                               



             code = 6210691561)                                        

 

             MV AE ratio (test                                        



             code = 2741398947)                                        

 

             LVOT Vmn (test code                                        



             = 3286760882)                                        

 

             MR Vmax (test code = 4.65 m/s                               



             1142623723)                                         

 

             AoV VTI (test code = 0.20 m                                 



             6058837947)                                         

 

             LVOT VTI (test code 0.15 m                                 



             = 3604118709)                                        

 

                          SUGEY (test code =          

                                                           



             SUGEY)          Left Ventricle:                           



                          Normal systolic                           



                          function with a                           



                          visually estimated                           



                          EF of 55 - 60%.                           



                          Grade III                              



                          (restrictive)                           



                          diastolic                              



                                                    dysfunction.

                                                           



                           Right Ventricle:                           



                          Right ventricle is                           



                          moderately dilated.                           



                          Mildly reduced                           



                                                    systolic function.

                                                           



                           Tricuspid Valve:                           



                          Moderate valvular                           



                          regurgitation. Left                           



                          VentricleLeft                           



                          ventricle size is                           



                          normal. Normal                           



                          systolic function                           



                          with a visually                           



                          estimated EF of 55 -                           



                          60%. Grade III                           



                          (restrictive)                           



                          diastolic                              



                          dysfunction.Right                           



                          VentricleRight                           



                          ventricle is                           



                          moderately dilated.                           



                          Mildly reduced                           



                          systolic                               



                          function.Left                           



                          AtriumLeft atrium is                           



                          severely                               



                          dilated.Right                           



                          AtriumRight atrium                           



                          is severely                            



                          dilated.Mitral                           



                          ValveMild mitral                           



                          annular                                



                          calcification. Mild                           



                          valvular                               



                          regurgitation.Tricus                           



                          pid ValveValve                           



                          structure is normal.                           



                          Moderate valvular                           



                          regurgitation.Aortic                           



                          ValveValve structure                           



                          is normal. No                           



                          significant valvular                           



                          regurgitation.Pulmon                           



                          ic ValveValve                           



                          structure is normal.                           



                          Mild valvular                           



                          regurgitation.Perica                           



                          rdiumThere is no                           



                          pericardial effusion                           



                          present.Study                           



                          DetailsStudy quality                           



                          was adequate. A                           



                          complete 2D, color                           



                          flow Doppler and                           



                          spectral Doppler                           



                          echocardiogram was                           



                          performed.The                           



                          apical, parasternal,                           



                          subcostal and                           



                          suprasternal views                           



                          were obtained.                           



                          Technical                              



                          difficulties due to                           



                          lung artifact.                           

 

             Lab Interpretation Abnormal                               



             (test code =                                        



             95722-7)                                            



[facility] ZqsyxlfjSDSB-[id number]-0 (COVID-19) RNA [Presence] in Respiratory specimen 
by EDWARD with probe [id number] 19:34:22





             Test Item    Value        Reference Range Interpretation Comments

 

             SARS-CoV-2 (COVID-19) RNA [Presence] Detected                      

         



             in Respiratory specimen by EDWARD with                                

        



             probe detection (test code =                                       

 



             05262-0)                                            

 

             Whether patient is employed in a Unknown                           

     



             healthcare setting (test code =                                    

    



             11497-8)                                            

 

             Whether the patient has symptoms Unknown                           

     



             related to condition of interest                                   

     



             (test code = 31966-5)                                        

 

             Whether the patient was hospitalized Unknown                       

         



             for condition of interest (test code                               

         



             = 67995-0)                                          

 

             Whether the patient was admitted to Unknown                        

        



             intensive care unit (ICU) for                                      

  



             condition of interest (test code =                                 

       



             19325-7)                                            

 

             Whether patient resides in a Unknown                               

 



             congregate care setting (test code =                               

         



             75680-3)                                            

 

             Pregnancy status (test code = Unknown                              

  



             31798-0)                                            

 

             Date and time of symptom onset (test Unknown                       

         



             code = 89927-2)                                        



[facility] [id number] 16:39:00





             Test Item    Value        Reference Range Interpretation Comments

 

             SODIUM (test code = NA) 138 mEq/L    134-147      N            

 

             POTASSIUM (test code = 3.7 mEq/L    3.4-5.0      N            



             K)                                                  

 

             CHLORIDE (test code = 100 mEq/L    100-108      N            



             CL)                                                 

 

             CARBON DIOXIDE (test 28 mEq/l     21-33        N            



             code = CO2)                                         

 

             ANION GAP (test code = 13           0-20         N            



             GAP)                                                

 

             GLUCOSE (test code = 77 mg/dL            N            



             GLU)                                                

 

             BLOOD UREA NITROGEN 13 mg/dL     7-18         N            



             (test code = BUN)                                        

 

             GLOMERULAR FILTRATION 11.6         80-90        L            Units 

of measure =



             RATE (test code = GFR)                                        ml/mi

n/1.73 m2

 

             CREATININE (test code = 3.9 mg/dL    0.6-1.3      H            



             CREAT)                                              

 

             CALCIUM (test code = 8.8 mg/dL    8.0-10.5     N            



             CA)                                                 



PROTHROMBIN [id number] 16:33:00





             Test Item    Value        Reference Range Interpretation Comments

 

             PROTHROMBIN TIME 13.1 SECONDS 9.3-12.9     H            



             PATIENT (test code =                                        



             PTP)                                                

 

             INTERNATIONAL NORMAL 1.2          0.8-1.2      N             TARGET

 INR BY



             RATIO (test code =                                        INDICATIO

N Indication



             INR)                                                INR1. Prophylax

is of



                                                                 venous thrombos

is 2.0



                                                                 - 3.0 (orthoped

ic



                                                                 surgery), Proph

ylaxis



                                                                 of venous throm

bosis



                                                                 (other than hig

h-risk



                                                                 surgery), Treat

ment of



                                                                 Deep Vein



                                                                 Thrombosis/Pulm

onary



                                                                 Embolism, Preve

ntion



                                                                 of systemic emb

olism -



                                                                 Tissue heart va

lves,



                                                                 Acute Myocardia

l



                                                                 Infarction (to 

prevent



                                                                 systemic emboli

sm),



                                                                 Valvular heart



                                                                 disease, Atrial



                                                                 Fibrillation,



                                                                 Bileaflet mecha

nical



                                                                 valve in aortic



                                                                 position.2. Mec

hanical



                                                                 prosthetic valv

es



                                                                 (high risk), 2.

5 - 3.5



                                                                 Presence of Lup

us



                                                                 Anticoagulant o

r



                                                                 Antiphospholipi

d



                                                                 Antibodies, Pre

vention



                                                                 of systemic emb

olism -



                                                                 Acute Myocardia

l



                                                                 Infarction (to 

prevent



                                                                 recurrent infar

ct).



CBC W/AUTO [id number] 16:23:00





             Test Item    Value        Reference Range Interpretation Comments

 

             WHITE BLOOD CELL (test code = 9.2 x10 3/uL 4.5-11.0     N          

  



             WBC)                                                

 

             RED BLOOD CELL (test code = 3.17 x10 6/uL 3.54-5.02    L           

 



             RBC)                                                

 

             HEMOGLOBIN (test code = HGB) 10.3 g/dL    11.0-15.0    L           

 

 

             HEMATOCRIT (test code = HCT) 33.2 %       33.0-45.0    N           

 

 

             MEAN CELL VOLUME (test code = 104.7 fL     81.0-99.0    H          

  



             MCV)                                                

 

             MEAN CELL HGB (test code = MCH) 32.5 pg      27.0-33.0    N        

    

 

             MEAN CELL HGB CONCETRATION 31.0 g/dL    33.0-37.0    L            



             (test code = MCHC)                                        

 

             RED CELL DISTRIBUTION WIDTH CV 14.5 %       11.5-14.5    N         

   



             (test code = RDW)                                        

 

             RED CELL DISTRIBUTION WIDTH SD 56.6 fL      37.0-54.0    H         

   



             (test code = RDW-SD)                                        

 

             PLATELET COUNT (test code = 108 x10 3/uL 150-400      L            



             PLT)                                                

 

             MEAN PLATELET VOLUME (test code 10.2 fL      7.0-9.0      H        

    



             = MPV)                                              

 

             NEUTROPHIL % (test code = NT%) 80.2 %       56.0-77.0    H         

   

 

             IMMATURE GRANULOCYTE % (test 1.4 %        0.0-2.0      N           

 



             code = IG%)                                         

 

             LYMPHOCYTE % (test code = LY%) 11.0 %       14.0-32.0    L         

   

 

             MONOCYTE % (test code = MO%) 5.2 %        4.8-9.0      N           

 

 

             EOSINOPHIL % (test code = EO%) 1.5 %        0.3-3.7      N         

   

 

             BASOPHIL % (test code = BA%) 0.7 %        0.0-2.0      N           

 

 

             NUCLEATED RBC % (test code = 0.0 %        0-0          N           

 



             NRBC%)                                              

 

             NEUTROPHIL # (test code = NT#) 7.34 x10 3/uL 2.0-7.6      N        

    

 

             IMMATURE GRANULOCYTE # (test 0.13 x10 3/uL 0.00-0.03    H          

  



             code = IG#)                                         

 

             LYMPHOCYTE # (test code = LY#) 1.01 x10 3/uL 1.0-3.8      N        

    

 

             MONOCYTE # (test code = MO#) 0.48 x10 3/uL 0.1-0.8      N          

  

 

             EOSINOPHIL # (test code = EO#) 0.14 x10 3/uL 0.0-0.2      N        

    

 

             BASOPHIL # (test code = BA#) 0.06 x10 3/uL 0.0-0.2      N          

  

 

             NUCLEATED RBC # (test code = 0.00 x10 3/uL 0.0-0.1      N          

  



             NRBC#)                                              

 

             MANUAL DIFF REQUIRED (test code NO                                 

    



             = MDIFF)                                            



Hepatitis B surface [id number] 21:36:32





             Test Item    Value        Reference Range Interpretation Comments

 

             Hep B S Ab (test code <8.0         See_Comment                [Auto

mated



             = 16725-5)                                          message] The



                                                                 system which



                                                                 generated this



                                                                 result transmit

kolby



                                                                 reference range

:



                                                                 <8.0 mIU/mL. Th

e



                                                                 reference range



                                                                 was not used to



                                                                 interpret this



                                                                 result as



                                                                 normal/abnormal

.

 

             SUGEY (test code = SUGEY)  ID -                           



                          DB                                     

 

             Lab Interpretation Normal                                 



             (test code = 37946-5)                                        



[id number]32





             Test Item    Value        Reference Range Interpretation Comments

 

             Hep B S Ab (test code <8.0         See_Comment                [Auto

mated



             = 43973-3)                                          message] The



                                                                 system which



                                                                 generated this



                                                                 result transmit

kolby



                                                                 reference range

:



                                                                 <8.0 mIU/mL. Th

e



                                                                 reference range



                                                                 was not used to



                                                                 interpret this



                                                                 result as



                                                                 normal/abnormal

.

 

             SUGEY (test code = SUGEY)  ID -                           



                          DB                                     

 

             Lab Interpretation Normal                                 



             (test code = 12496-6)                                        



[facility] [id number] 21:36:32





             Test Item    Value        Reference Range Interpretation Comments

 

             Hep B S Ab (test code <8.0         See_Comment                [Auto

mated



             = 28651-6)                                          message] The



                                                                 system which



                                                                 generated this



                                                                 result transmit

kolby



                                                                 reference range

:



                                                                 <8.0 mIU/mL. Th

e



                                                                 reference range



                                                                 was not used to



                                                                 interpret this



                                                                 result as



                                                                 normal/abnormal

.

 

             SUGEY (test code = SUGEY)  ID -                           



                          DB                                     

 

             Lab Interpretation Normal                                 



             (test code = 86585-8)                                        



[facility] B surface [id number] 21:36:32





             Test Item    Value        Reference Range Interpretation Comments

 

             Hep B S Ab (test code <8.0         See_Comment                [Auto

mated



             = 30397-9)                                          message] The



                                                                 system which



                                                                 generated this



                                                                 result transmit

kolby



                                                                 reference range

:



                                                                 <8.0 mIU/mL. Th

e



                                                                 reference range



                                                                 was not used to



                                                                 interpret this



                                                                 result as



                                                                 normal/abnormal

.

 

             SUGEY (test code = SUGEY)  ID -                           



                          DB                                     

 

             Lab Interpretation Normal                                 



             (test code = 32144-0)                                        



[facility] B surface [id number] 21:36:32





             Test Item    Value        Reference Range Interpretation Comments

 

             Hep B S Ab (test code <8.0         See_Comment                [Auto

mated



             = 28183-1)                                          message] The



                                                                 system which



                                                                 generated this



                                                                 result transmit

kolby



                                                                 reference range

:



                                                                 <8.0 mIU/mL. Th

e



                                                                 reference range



                                                                 was not used to



                                                                 interpret this



                                                                 result as



                                                                 normal/abnormal

.

 

             SUGEY (test code = SUGEY)  ID -                           



                          DB                                     

 

             Lab Interpretation Normal                                 



             (test code = 61128-5)                                        



[facility] B surface [id number] 21:36:32





             Test Item    Value        Reference Range Interpretation Comments

 

             Hep B S Ab (test code <8.0         See_Comment                [Auto

mated



             = 01821-2)                                          message] The



                                                                 system which



                                                                 generated this



                                                                 result transmit

kolby



                                                                 reference range

:



                                                                 <8.0 mIU/mL. Th

e



                                                                 reference range



                                                                 was not used to



                                                                 interpret this



                                                                 result as



                                                                 normal/abnormal

.

 

             SUGEY (test code = SUGEY)  ID -                           



                          DB                                     

 

             Lab Interpretation Normal                                 



             (test code = 27907-6)                                        



[facility] B surface [id number] 21:36:32





             Test Item    Value        Reference Range Interpretation Comments

 

             Hep B S Ab (test code <8.0         See_Comment                [Auto

mated



             = 95982-9)                                          message] The



                                                                 system which



                                                                 generated this



                                                                 result transmit

kolby



                                                                 reference range

:



                                                                 <8.0 mIU/mL. Th

e



                                                                 reference range



                                                                 was not used to



                                                                 interpret this



                                                                 result as



                                                                 normal/abnormal

.

 

             SUGEY (test code = SUGEY)  ID -                           



                          DB                                     

 

             Lab Interpretation Normal                                 



             (test code = 70449-4)                                        



[facility] B surface [id number] 21:36:32





             Test Item    Value        Reference Range Interpretation Comments

 

             Hep B S Ab (test code <8.0         See_Comment                [Auto

mated



             = 60947-3)                                          message] The



                                                                 system which



                                                                 generated this



                                                                 result transmit

kolby



                                                                 reference range

:



                                                                 <8.0 mIU/mL. Th

e



                                                                 reference range



                                                                 was not used to



                                                                 interpret this



                                                                 result as



                                                                 normal/abnormal

.

 

             SUGEY (test code = SUGEY)  ID -                           



                          DB                                     

 

             Lab Interpretation Normal                                 



             (test code = 29334-3)                                        



[facility] [id number] 21[facility]36:32





             Test Item    Value        Reference Range Interpretation Comments

 

             HEPATITIS B SURFACE ANTIBODY < mIU/mL     <8.0                     

 



             (BEAKER) (test code = 647)                                        



 ID - DBHepatitis B surface [id number] 21:24:22





             Test Item    Value        Reference Range Interpretation Comments

 

             Hepatitis B surface Nonreactive  Nonreactive               



             antigen (test code =                                        



             5195-3)                                             

 

             SUGEY (test code = SUGEY) Specimen is                            



                          considered negative                           



                          for HBsAg.                             

 

             Lab Interpretation (test Normal                                 



             code = 82987-8)                                        



[facility] B surface [id number] 21:24:22





             Test Item    Value        Reference Range Interpretation Comments

 

             Hepatitis B surface Nonreactive  Nonreactive               



             antigen (test code =                                        



             5195-3)                                             

 

             SUGEY (test code = SUGEY) Specimen is                            



                          considered negative                           



                          for HBsAg.                             

 

             Lab Interpretation (test Normal                                 



             code = 65045-1)                                        



[facility] B SURFACE [id number] 21:24:22





             Test Item    Value        Reference Range Interpretation Comments

 

             HEPATITIS B SURFACE ANTIGEN (2) Nonreactive  Nonreactive           

    



             (BEAKER) (test code = 2585)                                        



Specimen is considered negative for HBsAg.Transthoracic Echocardiogram Complete,
(w Contrast, Strain and 3D if needed)2022 13:23:07





             Test Item    Value        Reference    Interpretation Comments



                                       Range                     

 

             EF (test code = 66 %         54-74                     



             [id number])                                         

 

             IVS,d (test code = 1.03 cm      0.6-0.9      A            



             9255573003)                                         

 

             LVPWD,d (test code = 1.27 cm      0.60-1.19    A            



             1024926368)                                         

 

             LV,s (test code = 2.56 cm                                



             9420803661)                                         

 

             LVOT Diam,S (test 2.01 cm                                



             code = 0177671030)                                        

 

             LV PEREZ VOL (test 69.20 ml                         



             code = 7988436979)                                        

 

             LV SYS VOL (test 23.74 ml     14-42                     



             code = 7611778139)                                        

 

             MV Peak E Adama (test 1.20 m/s                               



             code = 5541344153)                                        

 

             MV Peak A Adama (test 0.47 m/s                               



             code = 7004142597)                                        

 

             E/A ratio (test code              See_Comment  A             [Autom

ated



             = 6805790819)                                        message] The



                                                                 system which



                                                                 generated this



                                                                 result



                                                                 transmitted



                                                                 reference range

:



                                                                 <=0.8. The



                                                                 reference range



                                                                 was not used to



                                                                 interpret this



                                                                 result as



                                                                 normal/abnormal

.

 

             E wave decelartion              See_Comment  A             [Automat

ed



             time (test code =                                        message] T

he



             8267578178)                                         system which



                                                                 generated this



                                                                 result



                                                                 transmitted



                                                                 reference range

:



                                                                 200 msec. The



                                                                 reference range



                                                                 was not used to



                                                                 interpret this



                                                                 result as



                                                                 normal/abnormal

.

 

             LV,d (test code = 3.98 cm                                



             5610438976)                                         

 

             IVS/LVPW,2D (test                                        



             code = 6277051092)                                        

 

             LV EF,2D (test code 73.32 %                                



             = 8514183872)                                        

 

             LV FS Cube 2D (test                                        



             code = 4487174151)                                        

 

             LV FS Teich 2D (test                                        



             code = 4579188915)                                        

 

             LV SV Teich 2D (test 45.46 ml                               



             code = 3333083356)                                        

 

             LV Vol s Teich PSAX 23.74 ml                               



             (test code =                                        



             8649741432)                                         

 

             LVOT stroke volume 0.35 cm3                               



             (test code =                                        



             5442794671)                                         

 

             Left Atrium  4.52 cm      See_Comment  A             [Automated



             Dimension Anterior                                        message] 

The



             (test code =                                        system which



             1154643639)                                         generated this



                                                                 result



                                                                 transmitted



                                                                 reference range

:



                                                                 <=3.8. The



                                                                 reference range



                                                                 was not used to



                                                                 interpret this



                                                                 result as



                                                                 normal/abnormal

.

 

             LA Vol MOD A4C (test 83.62 ml                               



             code = 5444380695)                                        

 

             LA area s A4C (test 28.59 cm2                              



             code = 8130653433)                                        

 

             LA Ao Ratio Mmode                                        



             (test code =                                        



             1543572959)                                         

 

             LVOT area (test code 3.17 cm2                               



             = 6446711076)                                        

 

             LVOT Vmax (test code 0.62 m/s                               



             = 8506682482)                                        

 

             AoV Mean PG (test              See_Comment                [Automate

d



             code = 0921046321)                                        message] 

The



                                                                 system which



                                                                 generated this



                                                                 result



                                                                 transmitted



                                                                 reference range

:



                                                                 20 mmHg. The



                                                                 reference range



                                                                 was not used to



                                                                 interpret this



                                                                 result as



                                                                 normal/abnormal

.

 

             AoV Peak PG (test              mmHg                      



             code = 1295746077)                                        

 

             AV LVOT peak              mmHg                      



             gradient (test code                                        



             = 1822674019)                                        

 

             AoV Area, Vmax (test 2.00 cm2     See_Comment                [Autom

ated



             code = 5404623137)                                        message] 

The



                                                                 system which



                                                                 generated this



                                                                 result



                                                                 transmitted



                                                                 reference range

:



                                                                 >=1.5. The



                                                                 reference range



                                                                 was not used to



                                                                 interpret this



                                                                 result as



                                                                 normal/abnormal

.

 

             LVOT VTI (CM) (test 11.00 cm                               



             code = 4591284242)                                        

 

             AoV Vmax (test code 0.98 m/s                               



             = 9179375584)                                        

 

             AoV Vmn (test code = 0.65 m/s                               



             7906789564)                                         

 

             AoV Area, VTI (test 2.42 cm2                               



             code = 4611037977)                                        

 

             Velocity Ratio 0.63 m/s                               



             (V1/V2) (test code =                                        



             4689)                                               

 

             MR peak grad (test              mmHg                      



             code = 6808540566)                                        

 

             MV stenosis pressure 31.00 ms     See_Comment                [Autom

ated



             1/2 time (test code                                        message]

 The



             = 2759432286)                                        system which



                                                                 generated this



                                                                 result



                                                                 transmitted



                                                                 reference range

:



                                                                 <=150. The



                                                                 reference range



                                                                 was not used to



                                                                 interpret this



                                                                 result as



                                                                 normal/abnormal

.

 

             MV E A ratio (test                                        



             code = 3383812826)                                        

 

             MV valve area p 1/2 7.10 cm2                               



             method (test code =                                        



             7052542080)                                         

 

             E prime lat (test                                        



             code = 1229450013)                                        

 

             E prine sept (test                                        



             code = 8251347392)                                        

 

             RVSP (test code =              See_Comment  A             [Automate

d



             5648297053)                                         message] The



                                                                 system which



                                                                 generated this



                                                                 result



                                                                 transmitted



                                                                 reference range

:



                                                                 40.00 mmHg. The



                                                                 reference range



                                                                 was not used to



                                                                 interpret this



                                                                 result as



                                                                 normal/abnormal

.

 

             RA pressure (test              mmHg                      



             code = 7323060114)                                        

 

             TR pk grad (test              mmHg                      



             code = 0449155797)                                        

 

             TR Vpeak (test code 3.51 m/s                               



             = 1909276526)                                        

 

             RVSP (TR) (test code              mmHg                      



             = 9456859989)                                        

 

             RVOT Vmax (test code 0.46 m/s                               



             = 9929406989)                                        

 

             PV Pk Grad (test              See_Comment                [Automated



             code = 7920992841)                                        message] 

The



                                                                 system which



                                                                 generated this



                                                                 result



                                                                 transmitted



                                                                 reference range

:



                                                                 36 mmHg. The



                                                                 reference range



                                                                 was not used to



                                                                 interpret this



                                                                 result as



                                                                 normal/abnormal

.

 

             RVOT pk grad (test              mmHg                      



             code = 4228393371)                                        

 

             PV VMAX (test code = 1.15 m/s     See_Comment                [Autom

ated



             7924194312)                                         message] The



                                                                 system which



                                                                 generated this



                                                                 result



                                                                 transmitted



                                                                 reference range

:



                                                                 <=3. The



                                                                 reference range



                                                                 was not used to



                                                                 interpret this



                                                                 result as



                                                                 normal/abnormal

.

 

             Ao root annulus 2.82 cm                                



             (test code =                                        



             7793967718)                                         

 

             Ao Root Diameter 3.23 cm      See_Comment                [Automated



             (test code =                                        message] The



             4316534678)                                         system which



                                                                 generated this



                                                                 result



                                                                 transmitted



                                                                 reference range

:



                                                                 <=3.99. The



                                                                 reference range



                                                                 was not used to



                                                                 interpret this



                                                                 result as



                                                                 normal/abnormal

.

 

             Ao Root Diameter 3.23 cm                                



             (test code =                                        



             0584341821)                                         

 

             Ascending aorta 3.69 cm                                



             (test code =                                        



             0765926161)                                         

 

             Pred METS R1 (test                                        



             code = 7599378876)                                        

 

             Pred Exer Dur R1                                        



             (test code =                                        



             5462234664)                                         

 

             MV Decel slope (test 11.26 m/s2                             



             code = 6493716689)                                        

 

             LV vol s cube 2D 16.83 ml                               



             (test code =                                        



             9396706662)                                         

 

             LV vol d cube 2D 63.08 ml                               



             (test code =                                        



             8831672192)                                         

 

             LV SV Cube 2D (test 46.25 ml                               



             code = 8505852233)                                        

 

             Calc MPHR (test code              bpm                       



             = 7779044161)                                        

 

             85 of MPHR (test                                        



             code = 5665441228)                                        

 

             MAX Pred HR (test                                        



             code = 3246265642)                                        

 

             LVOT mean grad (test              mmHg                      



             code = 6739172102)                                        

 

             Aov area Vmn (test 1.92 cm2                               



             code = 3863178135)                                        

 

             MV AE ratio (test                                        



             code = 5351099373)                                        

 

             LVOT Vmn (test code                                        



             = 6590025455)                                        

 

             MR Vmax (test code = 4.22 m/s                               



             [id number])                                         

 

             AoV VTI (test code = 0.15 m                                 



             [id number])                                         

 

             LVOT VTI (test code 0.11 m                                 



             = [id number])                                        

 

                          SUGEY (test code =          

                                                           



             SUGEY)          Left Ventricle:                           



                          Normal systolic                           



                          function with a                           



                          visually estimated                           



                          EF of 65 - 70%.                           



                          Grade I (impaired                           



                          relaxation)                            



                          diastolic                              



                                                    dysfunction.

                                                           



                           Left Atrium: Left                           



                          atrium is mildly                           



                                                    dilated.

                                                           



                           Right Ventricle:                           



                          Right ventricle is                           



                          moderately dilated.                           



                          Reduced systolic                           



                                                    function.

                                                           



                           Right Atrium: Right                           



                          atrium is moderately                           



                                                    dilated.

                                                           



                           Mitral Valve: Valve                           



                          structure is normal.                           



                          Mildly calcified                           



                          leaflets. Mild                           



                          valvular                               



                                                    regurgitation.

                                                           



                           Tricuspid Valve:                           



                          Moderate valvular                           



                          regurgitation.                           



                          Moderately elevated                           



                          pulmonary artery                           



                          systolic pressure.                           



                          RVSP is 59.82 mmHg.                           



                          Left VentricleLeft                           



                          ventricle size is                           



                          normal. Normal                           



                          systolic function                           



                          with a visually                           



                          estimated EF of 65 -                           



                          70%. Grade I                           



                          (impaired                              



                          relaxation)                            



                          diastolic                              



                          dysfunction.Right                           



                          VentricleRight                           



                          ventricle is                           



                          moderately dilated.                           



                          Reduced systolic                           



                          function.Left                           



                          AtriumLeft atrium is                           



                          mildly dilated.Right                           



                          AtriumRight atrium                           



                          is moderately                           



                          dilated.Mitral                           



                          ValveValve structure                           



                          is normal. Mildly                           



                          calcified leaflets.                           



                          Mild valvular                           



                          regurgitation.Tricus                           



                          pid ValveValve                           



                          structure is normal.                           



                          Moderate valvular                           



                          regurgitation.                           



                          Moderately elevated                           



                          pulmonary artery                           



                          systolic pressure.                           



                          RVSP is 59.82                           



                          mmHg.Aortic                            



                          ValveValve structure                           



                          appears tricuspid.                           



                          No significant                           



                          valvular                               



                          regurgitation. No                           



                          stenosis.Pulmonic                           



                          ValveValve structure                           



                          is                                     



                          normal.PericardiumTh                           



                          ere is no                              



                          pericardial effusion                           



                          present.Study                           



                          DetailsStudy quality                           



                          was good. A complete                           



                          2D, color flow                           



                          Doppler and spectral                           



                          Doppler                                



                          echocardiogram was                           



                          performed.The                           



                          apical, parasternal,                           



                          subcostal and                           



                          suprasternal views                           



                          were obtained.                           



                          Patient exhibited                           



                          sinus tachycardia.                           

 

             Lab Interpretation Abnormal                               



             (test code =                                        



             77817-8)                                            



[facility]2022-06-19 07:26:00





             Test Item    Value        Reference    Interpretation Comments



                                       Range                     

 

             Urine culture Proteus [id number]              A            Specime

n



             isolate (test cfu/mlThe                              [facility]



             code = 39087-0) performance                            Source: Urin

eSpecimen



                          characteristics of                           Site: [place]

an catch



                          this assay on this                           



                          isolatewere                            



                          validated by the                           



                          Microbiology                           



                          Laboratory at                           



                          [facility]. This                           



                          source has not been                           



                          approved by the                           



                          U.S. Food and Drug                           



                          Administration. The                           



                          results are not                           



                          intended to be used                           



                          as the sole means                           



                          for clinical                           



                          diagnosis or                           



                          patient management.                           



                          The Microbiology                           



                          Laboratory is                           



                          authorized under                           



                          the clinical                           



                          Laboratory                             



                          Improvement                            



                          Amendments of 1988                           



                          (CLIA-88) to                           



                          perform high                           



                          complexity testing.                           

 

             Lab          Abnormal                               



             Interpretation                                        



             (test code =                                        



             23268-0)                                            



[facility]2022-06-19 07:26:00





             Test Item    Value        Reference    Interpretation Comments



                                       Range                     

 

             Urine culture Proteus [id number]              A            Specime

n



             isolate (test cfu/mlThe                              [facility]

cimen



             code = 52549-8) performance                            Source: Urin

eSpecimen



                          characteristics of                           Site: [facility] [place]

an catch



                          this assay on this                           



                          isolatewere                            



                          validated by the                           



                          Microbiology                           



                          Laboratory at                           



                          [facility]. This                           



                          source has not been                           



                          approved by the                           



                          U.S. Food and Drug                           



                          Administration. The                           



                          results are not                           



                          intended to be used                           



                          as the sole means                           



                          for clinical                           



                          diagnosis or                           



                          patient management.                           



                          The Microbiology                           



                          Laboratory is                           



                          authorized under                           



                          the clinical                           



                          Laboratory                             



                          Improvement                            



                          Amendments of 1988                           



                          (CLIA-88) to                           



                          perform high                           



                          complexity testing.                           

 

             Lab          Abnormal                               



             Interpretation                                        



             (test code =                                        



             77068-5)                                            



[facility]2022-06-19 07:26:00





             Test Item    Value        Reference    Interpretation Comments



                                       Range                     

 

             Urine culture Proteus [id number]              A            Specime

n



             isolate (test cfu/mlThe                              InformationSpe

[facility]



             code = 36793-1) performance                            Source: Urin

eSpecimen



                          characteristics of                           Site: [place]

an catch



                          this assay on this                           



                          isolatewere                            



                          validated by the                           



                          Microbiology                           



                          Laboratory at                           



                          [facility]. This                           



                          source has not been                           



                          approved by the                           



                          U.S. Food and Drug                           



                          Administration. The                           



                          results are not                           



                          intended to be used                           



                          as the sole means                           



                          for clinical                           



                          diagnosis or                           



                          patient management.                           



                          The Microbiology                           



                          Laboratory is                           



                          authorized under                           



                          the clinical                           



                          Laboratory                             



                          Improvement                            



                          Amendments of 1988                           



                          (CLIA-88) to                           



                          perform high                           



                          complexity testing.                           

 

             Lab          Abnormal                               



             Interpretation                                        



             (test code =                                        



             82828-7)                                            



[facility]Presence] in Respiratory specimen 
by EDWARD with probe [id number] 00:41:02





             Test Item    Value        Reference Range Interpretation Comments

 

             SARS-CoV-2 (COVID-19) RNA Not detected                           



             [Presence] in Respiratory                                        



             specimen by EDWARD with probe                                        



             detection (test code = 23641-9)                                    

    

 

             Whether patient is employed in a Unknown                           

     



             healthcare setting (test code =                                    

    



             68774-4)                                            

 

             Whether the patient has symptoms Unknown                           

     



             related to condition of interest                                   

     



             (test code = 43709-4)                                        

 

             Whether the patient was Unknown                                



             hospitalized for condition of                                      

  



             interest (test code = 19984-6)                                     

   

 

             Whether the patient was admitted Unknown                           

     



             to intensive care unit (ICU) for                                   

     



             condition of interest (test code                                   

     



             = 69314-0)                                          

 

             Whether patient resides in a Unknown                               

 



             congregate care setting (test                                      

  



             code = 99124-7)                                        

 

             Pregnancy status (test code = Unknown                              

  



             81855-5)                                            

 

             Date and time of symptom onset Unknown                             

   



             (test code = 13443-4)                                        



[facility]- XR [id number] 10:28:00
************************************************************[facility] [place] [place]  : 1955 Sex: 
F************************************************************ Name: 
SUZI SEARS [place] : 1955 Age/S: 66 / F [id number] 
Unit #: [id number] Loc: [place] [address] Phys: Suzie Dominguez MD Acct: 
[id number] Dis Date: Status: REG [facility] [place] PHONE #: [phone] Exam Date: 
2022 0950 FAX #: Reason: ERCP EXAMS: CPT: [id number] XR ERCP [id number] Fluoro
Time: 33 SEC DAP (Gy m2): Air Kerma (mGy): EXAMINATION: - XR ERCP. LOCATION: 
S17. HISTORY: ERCP, CBDobstruction. COMPARISON: None. FINDINGS/ IMPRESSION: Nine
 portable limited intraoperative fluoroscopic images of right upper quadrant 
during ERCP are submitted to radiology department. Initial image demonstrates 
CBD stent and postoperative clips in upper abdomen. Subsequent images 
demonstrate contrastopacification of CBD and small bowel.  Please see operative 
report for further details. No radiologist was present during procedure. 
FLUOROSCOPIC TIME: 35.6 seconds. Reference air Kerma: 11.146 mGy.  ** 
Electronically Signed by ISH Paz ** ** on 2022 at 1028 **
 Reported and signed by: Roverto Paz M.D. CC: Suzie Dominguez MD; 
Charisse Green MD PAGE 1  Signed Report Name:SUZI SEARS [place] 
: 1955 Age/S: 66 / F [id number] Unit #: [id number] Loc: 
[place] [address] Phys: Suzie Dominguez MD Acct: [id number] Dis Date: Status: 
REG [facility] [place] PHONE #: [phone] Exam Date: 2022 0950 FAX #: Reason: ERCP  
EXAMS: CPT: [id number] XR ERCP [id number] Fluoro Time: 33 SEC DAP (Gy m2): Air Kerma 
(mGy): (Continued) Technologist: Maribel Diaz RT(R) Trnscb Date/Time: 
2022 (2367) t.[email or web address] Orig Print D/T: S: 2022 (3420) PAGE 2 Signed 
[id number] 08:51:00





             Test Item    Value        Reference Range Interpretation Comments

 

             POTASSIUM (test code = K) 5.1 mmol/L   3.4-5.0      H            



CBC W/AUTO [id number] 08:15:00





             Test Item    Value        Reference Range Interpretation Comments

 

             WHITE BLOOD CELL (test code = 9.2 K/mm3    3.5-11.0     N          

  



             WBC)                                                

 

             RED BLOOD CELL (test code = 3.76 M/mm3   4.70-6.10    L            



             RBC)                                                

 

             HEMOGLOBIN (test code = HGB) 11.8 G/DL    10.4-14.9    N           

 

 

             HEMATOCRIT (test code = HCT) 37.7 %       31.5-44.1    N           

 

 

             MEAN CELL VOLUME (test code = 100.3 Fl     84.5-98.6    H          

  



             MCV)                                                

 

             MEAN CELL HGB (test code = MCH) 31.4 pg      27.0-34.2    N        

    

 

             MEAN CELL HGB CONCETRATION 31.3 G/DL    31.5-34.0    L            



             (test code = MCHC)                                        

 

             RED CELL DISTRIBUTION WIDTH 13.2 SD      11.5-14.5    N            



             (test code = RDW)                                        

 

             PLATELET COUNT (test code = 136 K/mm3    150-450      L            



             PLT)                                                

 

             MEAN PLATELET VOLUME (test code 9.80 fL      7.0-10.5     N        

    



             = MPV)                                              

 

             NEUTROPHIL % (test code = NT%) 71.9 %       40-76        N         

   

 

             IMMATURE GRANULOCYTE % (test 0.9 %        0.0-5.0      N           

 



             code = IG%)                                         

 

             LYMPHOCYTE % (test code = LY%) 18.1 %       20.5-51.1    L         

   

 

             MONOCYTE % (test code = MO%) 6.8 %        1.7-9.3      N           

 

 

             EOSINOPHIL % (test code = EO%) 1.5 %        0.0-6.0      N         

   

 

             BASOPHIL % (test code = BA%) 0.8 %        0.0-2.0      N           

 

 

             NUCLEATED RBC % (test code = 0.0 /100WBC% 0.0-1.0      N           

 



             NRBC%)                                              

 

             NEUTROPHIL # (test code = NT#) 6.7 K/mm3    1.8-7.6      N         

   

 

             IMMATURE GRANULOCYTE # (test 0.08 x10 3/uL 0.00-0.03    H          

  



             code = IG#)                                         

 

             LYMPHOCYTE # (test code = LY#) 1.7 K/mm3    0.6-3.2      N         

   

 

             MONOCYTE # (test code = MO#) 0.6 K/mm3    0.3-1.1      N           

 

 

             EOSINOPHIL # (test code = EO#) 0.1 K/mm3    0.0-0.4      N         

   

 

             BASOPHIL # (test code = BA#) 0.1 K/mm3    0.0-0.1      N           

 

 

             NUCLEATED RBC # (test code = 0.0 K/mm3    0.0-0.1      N           

 



             NRBC#)                                              

 

             MANUAL DIFF REQUIRED (test code NO DIFF/SCN  CRITERIA              

    



             = MDIFF)                                            



BASIC METABOLIC [id number] 08:08:00





             Test Item    Value        Reference Range Interpretation Comments

 

             SODIUM (test code = NA) 135 mmol/L   134-147      N            

 

             POTASSIUM (test code = K) 5.9 mmol/L   3.4-5.0      HH           

 

             CHLORIDE (test code = CL) 103 mmol/L   100-108      N            

 

             CARBON DIOXIDE (test code = CO2) 27 mmol/L    21-32        N       

     

 

             ANION GAP (test code = GAP) 5.0 GAP calc 4.0-15.0     N            

 

             GLUCOSE (test code = GLU) 109 MG/DL           N            

 

             BLOOD UREA NITROGEN (test code = 43 MG/DL     7-18         H       

     



             BUN)                                                

 

             GLOMERULAR FILTRATION RATE (test 4 estGFR     >60          L       

     



             code = GFR)                                         

 

             CREATININE (test code = CREAT) 10.5 MG/DL   0.6-1.0      H         

   

 

             CALCIUM (test code = CA) 9.9 MG/DL    8.5-10.1     N            



COVID 19 INHOUSE [id number] 14:11:00





             Test Item    Value        Reference Range Interpretation Comments

 

             COVID 19 INHOUSE AG NEGATIVE     Negative                  Per manu

facturer,



             (test code =                                        negative result

s should



             [id number])                                        be treated aspr

esumptive



                                                                 and, if inconsi

stent with



                                                                 clinical signs



                                                                 andsymptoms or 

necessary



                                                                 for patient man

agement,



                                                                 should betested

 with an



                                                                 alternative mol

ecular



                                                                 assay. Negative

 resultsdo



                                                                 not preclude SA

RS-CoV-2



                                                                 infection and s

hould not



                                                                 be usedas the s

ole basis



                                                                 for patient man

agement



                                                                 decisions. Nega

tive



                                                                 results should 

be



                                                                 considered in t

he context



                                                                 of apatient's r

ecent



                                                                 exposures, hist

ory,



                                                                 presence of cli

nicalsigns



                                                                 and symptoms co

nsistent



                                                                 with COVID-19.



CBC W/AUTO [id number] 14:00:00





             Test Item    Value        Reference Range Interpretation Comments

 

             WHITE BLOOD CELL 7.2 K/mm3    3.5-11.0     N            



             (test code = WBC)                                        

 

             RED BLOOD CELL (test 4.01 M/mm3   4.70-6.10    L            



             code = RBC)                                         

 

             HEMOGLOBIN (test code 12.6 G/DL    10.4-14.9    N            



             = HGB)                                              

 

             HEMATOCRIT (test code 39.7 %       31.5-44.1    N            



             = HCT)                                              

 

             MEAN CELL VOLUME 99.0 Fl      84.5-98.6    H            



             (test code = MCV)                                        

 

             MEAN CELL HGB (test 31.4 pg      27.0-34.2    N            



             code = MCH)                                         

 

             MEAN CELL HGB 31.7 G/DL    31.5-34.0    N            



             CONCETRATION (test                                        



             code = MCHC)                                        

 

             RED CELL DISTRIBUTION 13.3 SD      11.5-14.5    N            



             WIDTH (test code =                                        



             RDW)                                                

 

             PLATELET COUNT (test 106 K/mm3    150-450      L            



             code = PLT)                                         

 

             MEAN PLATELET VOLUME 10.20 fL     7.0-10.5     N            



             (test code = MPV)                                        

 

             NEUTROPHIL % (test 72.0 %       40-76        N            



             code = NT%)                                         

 

             IMMATURE GRANULOCYTE 0.7 %        0.0-5.0      N            



             % (test code = IG%)                                        

 

             LYMPHOCYTE % (test 17.8 %       20.5-51.1    L            



             code = LY%)                                         

 

             MONOCYTE % (test code 7.4 %        1.7-9.3      N            



             = MO%)                                              

 

             EOSINOPHIL % (test 1.4 %        0.0-6.0      N            



             code = EO%)                                         

 

             BASOPHIL % (test code 0.7 %        0.0-2.0      N            



             = BA%)                                              

 

             NUCLEATED RBC % (test 0.0 /100WBC% 0.0-1.0      N            



             code = NRBC%)                                        

 

             NEUTROPHIL # (test 5.2 K/mm3    1.8-7.6      N            



             code = NT#)                                         

 

             IMMATURE GRANULOCYTE 0.05 x10 3/uL 0.00-0.03    H            



             # (test code = IG#)                                        

 

             LYMPHOCYTE # (test 1.3 K/mm3    0.6-3.2      N            



             code = LY#)                                         

 

             MONOCYTE # (test code 0.5 K/mm3    0.3-1.1      N            



             = MO#)                                              

 

             EOSINOPHIL # (test 0.1 K/mm3    0.0-0.4      N            



             code = EO#)                                         

 

             BASOPHIL # (test code 0.1 K/mm3    0.0-0.1      N            



             = BA#)                                              

 

             NUCLEATED RBC # (test 0.0 K/mm3    0.0-0.1      N            



             code = NRBC#)                                        

 

             MANUAL DIFF REQUIRED NO DIFF/SCN  CRITERIA                  SLIDE R

SERGIOW



             (test code = MDIFF)                                        CONSISTA

NT WITH



                                                                 AUTO DIFFERENTI

AL.



HEPATIC FUNCTION [id number] 13:32:00





             Test Item    Value        Reference Range Interpretation Comments

 

             TOTAL PROTEIN (test code = PROT) 7.7 G/DL     6.4-8.2      N       

     

 

             ALBUMIN (test code = ALB) 3.4 G/DL     3.4-5.0      N            

 

             BILIRUBIN TOTAL (test code = BILT) 2.10 MG/DL   0.2-1.2      H     

       

 

             BILIRUBIN DIRECT (test code = 0.50 MG/DL   0.00-0.30    H          

  



             BILD)                                               

 

             BILIRUBIN INDIRECT (test code = 1.60 MG/DL   0.2-1.2      H        

    



             BILIND)                                             

 

             SGOT/AST (test code = AST) 17 Unit/L    15-37        N            

 

             SGPT/ALT (test code = ALT) 27 Unit/L    12-78        N            

 

             ALKALINE PHOSPHATASE TOTAL (test 222 Unit/L          H       

     



             code = ALKP)                                        



[id number] 13:32:00





             Test Item    Value        Reference Range Interpretation Comments

 

             LIPASE (test code = LIP) 109 Unit/L   114-286      L            



BASIC METABOLIC [id number] 13:32:00





             Test Item    Value        Reference Range Interpretation Comments

 

             SODIUM (test code = NA) 136 mmol/L   134-147      N            

 

             POTASSIUM (test code = K) 5.1 mmol/L   3.4-5.0      H            

 

             CHLORIDE (test code = CL) 103 mmol/L   100-108      N            

 

             CARBON DIOXIDE (test code = CO2) 26 mmol/L    21-32        N       

     

 

             ANION GAP (test code = GAP) 7.0 GAP calc 4.0-15.0     N            

 

             GLUCOSE (test code = GLU) 103 MG/DL           N            

 

             BLOOD UREA NITROGEN (test code = 37 MG/DL     7-18         H       

     



             BUN)                                                

 

             GLOMERULAR FILTRATION RATE (test 6 estGFR     >60          L       

     



             code = GFR)                                         

 

             CREATININE (test code = CREAT) 7.4 MG/DL    0.6-1.0      H         

   

 

             CALCIUM (test code = CA) 10.1 MG/DL   8.5-10.1     N            



[id number] 18:01:00





             Test Item    Value        Reference Range Interpretation Comments

 

             SURGICAL (test --------------------------------                    

       



             code = SR)   --------------------------------                      

     



                          ----------------------------RUN                       

    



                          DATE: 22  [facility] PAGE 1 RUN TIME:                       

    



                          1801 Specimen Inquiry RUN USER:                       

    



                          INTERFACE                              



                          --------------------------------                      

     



                          --------------------------------                      

     



                          ----------------------------KYLE                      

     



                          ENT: SUZI SEARS ACCT #:                          

 



                          [id number] LOC: L.END  #:                          

 



                          [id number] AGE/SX: 66/F ROOM:                         

  



                          RE22REG DR:                           



                          Clark Cuellar MD :                           



                          57 BED: DIS:  STATUS: LEEROY                       

    



                          [facility] [place]LOC:                              



                          --------------------------------                      

     



                          --------------------------------                      

     



                          ----------------------------                          

 



                          SPEC #: 22:PMC: RECD:                           



                          22- STATUS: VERONICA RELARA                        

   



                          #: [id number] SOFIA: 22-[facility] DR: Clark Cuellar MD                          

 



                          ENTERED:  SP TYPE:                       

    



                          SURGICAL  OTHR DR:                           



                          Charisse Green MD ORDERED:                         

  



                          17168/2, 95916, 35856, 12607,                         

  



                          ANATOMIC SPEC, SPECIMEN TRACK                         

  



                          COPIES TO: Charisse Green MD                       

    



                          [address] [place] [address] [phone]                           



                          [place] [address]                         

  



                          [phone] PROCEDURES: [id number]                        

   



                          () 21887                           



                          () 67135                           



                          () 75167                           



                          () SPECIMEN TRACK                        

   



                          () TISSUES: A.                           



                          GASTRIC MUCOSA - BX ANTRUM AND                        

   



                          BODY B. ESOPHAGUS BIOPSY -                           



                          DISTAI ESOPHAGUS BX FINAL                           



                          DIAGNOSIS A. Stomach, antrum and                      

     



                          body, endoscopic biopsy:-                           



                          Healing/reparative/chemical                           



                          gastropathy changes, minimal to                       

    



                          mild- Negative for intestinal                         

  



                          metaplasia- Negative for                           



                          Helicobacter pylori                           



                          (Immunohistochemistry stain) B.                       

    



                          Esophagus, distal, endoscopic                         

  



                          biopsy:- Reflux esophagitis,                          

 



                          mild- Negative for glandular                          

 



                          epithelium/intestinal                           



                          metaplasia/dysplasia (Alcian                          

 



                          Blue stain)- Negative for fungal                      

     



                          organisms (PAS stain) Comment:                        

   



                          Suggest clinical correlation.                         

  



                          Note: For each marker stain                           



                          tested above: Positive control                        

   



                          is positive and                           



                          Negative(external or internal)                        

   



                          control is negative (staining                         

  



                          performed at [facility]).                       

    



                          ** CONTINUED ON NEXT PAGE **                          

 



                          --------------------------------                      

     



                          --------------------------------                      

     



                          ----------------------------RUN                       

    



                          DATE: 22 [facility] PAGE 2 RUN TIME:                       

    



                          1801 Specimen Inquiry RUN USER:                       

    



                          INTERFACE                              



                          --------------------------------                      

     



                          --------------------------------                      

     



                          ----------------------------SPEC                      

     



                          #: 22:[facility]: PATIENT:                           



                          SUZI SEARS #[id number]                         

  



                          (Continued)---------------------                      

     



                          --------------------------------                      

     



                          --------------------------------                      

     



                          ------- GROSS DESCRIPTION A.                          

 



                          Antrum and body biopsy. It                           



                          consists of 4 tissue fragments                        

   



                          measuring 2-5 mm. All as A1. B.                       

    



                          Distal esophageal biopsy. It                          

 



                          consists of 2 tissue fragments                        

   



                          measuring 3 mm each. Allas B1.                        

   



                          Technical component performed at                      

     



                          [facility],[place] [address] ,                        

   



                          [place] [address] Unless gross                         

  



                          only, the diagnosis is based                          

 



                          upon microscopic                           



                          examination.Immunohistochemistry                      

     



                          : This test was developed and                         

  



                          its performancecharacteristics                        

   



                          determined by this laboratory.                        

   



                          It has not been approved nordoes                      

     



                          it need approval by the [place] FDA.                       

    



                          Appropriate positive and                           



                          negative controlsare reviewed                         

  



                          and judged to be acceptable.                          

 



                          This laboratory is certified                          

 



                          underthe Clinical Laboratory                          

 



                          Improvement Amendments (CLIA-88)                      

     



                          as qualified toperform high                           



                          complexity clinical laboratory                        

   



                          testing. MICROSCOPIC DESCRIPTION                      

     



                          Findings are incorporated into                        

   



                          the diagnosis                           



                          section.------------------------                      

     



                          --------------------------------                      

     



                          --------------------------------                      

     



                          ---- Signed SIGNATURE ON FILE                         

  



                          Kye Fontenot 22 1801                         

  



                          --------------------------------                      

     



                          --------------------------------                      

     



                          ----------------------------  **                      

     



                          END OF REPORT **                           



BASIC METABOLIC [id number] 08:13:00





             Test Item    Value        Reference Range Interpretation Comments

 

             SODIUM (test code = NA) 137 mmol/L   134-147      N            

 

             POTASSIUM (test code = K) 4.2 mmol/L   3.4-5.0      N            

 

             CHLORIDE (test code = CL) 105 mmol/L   100-108      N            

 

             CARBON DIOXIDE (test code = CO2) 23 mmol/L    21-32        N       

     

 

             ANION GAP (test code = GAP) 9.0 GAP calc 4.0-15.0     N            

 

             GLUCOSE (test code = GLU) 111 MG/DL           H            

 

             BLOOD UREA NITROGEN (test code = 41 MG/DL     7-18         H       

     



             BUN)                                                

 

             GLOMERULAR FILTRATION RATE (test 5 estGFR     >60          L       

     



             code = GFR)                                         

 

             CREATININE (test code = CREAT) 8.6 MG/DL    0.6-1.0      H         

   

 

             CALCIUM (test code = CA) 9.8 MG/DL    8.5-10.1     N            



- XR CHEST 2 -14-33 13:28:00
************************************************************[facility] [place] [place] : 1955 Sex: 
F************************************************************ Name: 
[facility]  : 1955 Age/S: 66 / F [id number] 
Unit #: [id number] Loc: [place] [address] Phys: Clark Cuellar MD  Acct: 
[id number] Dis Date: Status: PRE [facility] PHONE#: [phone] Exam Date: 
2022 1253  FAX #: Reason: PRE OP EXAMS: CPT: 19562 XR CHEST 2 V 87546 
Fluoro Time: DAP (Gy m2): Air Kerma (mGy): Chest 2 views 2022 1:27 PM 
CLINICAL HISTORY: Preop  COMPARISON: None available LOCATION: W1 IMPRESSION: 
There is elevation of the right hemidiaphragm. Bibasilar opacities suggest 
atelectasis. Pneumonia should be excluded clinically. Cardiomediastinal contours
 are within normal limits. The central vasculature is not engorged. A tunneled 
left hemodialysis catheter is present. There are chronic appearing degenerative 
changes in the skeleton. ** Electronically Signed by M.D. Timothy Seipel ** **  
on 2022 at 1328 ** Reported and signed by: Timothy Seipel, M.D. CC: 
Charisse Green MD; Clark Cuellar MD PAGE 1 Signed Report Name: 
SUZ[facility] SEARS  [facility] : 1955 Age/S: 66 / F [id number] 
Unit #: [id number] Loc: [place] [address]  Phys: Clark Cuellar MD Acct: 
[id number] Dis Date: Status: PRE SDC PHONE #: [phone] Exam Date: 
2022 1253 FAX #: Reason: PRE OP EXAMS: CPT:  [id number] XR CHEST 2 V 98917 
FluoroTime: DAP (Gy m2): Air Kerma (mGy): (Continued) Technologist: RT [facility] (R)(CT)  Trnscb Date/Time: 2022 (1328) [id number] Orig Print D/T: S: 
2022 (7528)  PAGE 2 Signed ReportBASIC METABOLIC [id number] 12:55:00





             Test Item    Value        Reference Range Interpretation Comments

 

             SODIUM (test code = NA) 134 mmol/L   134-147      N            

 

             POTASSIUM (test code = K) 4.6 mmol/L   3.4-5.0      N            

 

             CHLORIDE (test code = CL) 100 mmol/L   100-108      N            

 

             CARBON DIOXIDE (test code = CO2) 28 mmol/L    21-32        N       

     

 

             ANION GAP (test code = GAP) 6.0 GAP calc 4.0-15.0     N            

 

             GLUCOSE (test code = GLU) 113 MG/DL           H            

 

             BLOOD UREA NITROGEN (test code = 38 MG/DL     7-18         H       

     



             BUN)                                                

 

             GLOMERULAR FILTRATION RATE (test 6 estGFR     >60          L       

     



             code = GFR)                                         

 

             CREATININE (test code = CREAT) 7.0 MG/DL    0.6-1.0      H         

   

 

             CALCIUM (test code = CA) 10.5 MG/DL   8.5-10.1     H            



COVID 19 INHOUSE [id number] 12:52:00





             Test Item    Value        Reference Range Interpretation Comments

 

             COVID 19 INHOUSE AG NEGATIVE     Negative                  Per manu

facturer,



             (test code =                                        negative result

s should



             [id number])                                        be treated aspr

esumptive



                                                                 and, if inconsi

stent with



                                                                 clinical signs



                                                                 andsymptoms or 

necessary



                                                                 for patient man

agement,



                                                                 should betested

 with an



                                                                 alternative mol

ecular



                                                                 assay. Negative

 resultsdo



                                                                 not preclude SA

RS-CoV-2



                                                                 infection and s

hould not



                                                                 be usedas the s

ole basis



                                                                 for patient man

agement



                                                                 decisions. Nega

tive



                                                                 results should 

be



                                                                 considered in t

he context



                                                                 of apatient's r

ecent



                                                                 exposures, hist

ory,



                                                                 presence of cli

nicalsigns



                                                                 and symptoms co

nsistent



                                                                 with COVID-19.



PROTHROMBIN [id number] 12:44:00





             Test Item    Value        Reference Range Interpretation Comments

 

             PT PATIENT (test 12.2 SECONDS 9.3-12.9     N            



             code = PTP)                                         

 

             INTER[facility]ATIONAL NORMAL 1.07 INR Unit 0.8-1.2      N             TARGE

T INR BY



             RATIO (test code =                                        INDICATIO

N Indication



             INR)                                                INR1. Prophylax

is of



                                                                 venous thrombos

is 2.0



                                                                 - 3.0 (orthoped

ic



                                                                 surgery), Proph

ylaxis



                                                                 of venous throm

bosis



                                                                 (other than hig

h-risk



                                                                 surgery), Treat

ment of



                                                                 Deep Vein



                                                                 Thrombosis/Pulm

onary



                                                                 Embolism, Preve

ntion



                                                                 of systemic emb

olism -



                                                                 Tissue heart va

lves,



                                                                 Acute Myocardia

l



                                                                 Infarction (to 

prevent



                                                                 systemic emboli

sm),



                                                                 Valvular heart



                                                                 disease, Acute



                                                                 Myocardial Infa

rction



                                                                 (to prevent sys

temic



                                                                 embolism), Valv

ular



                                                                 heart disease, 

Atrial



                                                                 Fibrillation,



                                                                 Bileaflet mecha

nical



                                                                 valve in aortic



                                                                 position.2. Mec

hanical



                                                                 prosthetic valv

es



                                                                 (high risk),  2

.5 -



                                                                 3.5 Presence of

 Lupus



                                                                 Anticoagulant o

r



                                                                 Antiphospholipi

d



                                                                 Antibodies, Pre

vention



                                                                 of systemic emb

olism -



                                                                 Acute Myocardia

l



                                                                 Infarction (to 

prevent



                                                                 recurrent infar

ct).



THROMBOPLASTIN TIME [id number] 12:44:00





             Test Item    Value        Reference Range Interpretation Comments

 

             THROMBOPLASTIN TIME PARTIAL 36.2 SECONDS 26-35        H            



             (test code = PTT)                                        



CBC W/AUTO [id number] 12:43:00





             Test Item    Value        Reference Range Interpretation Comments

 

             WHITE BLOOD CELL (test code = 9.1 K/mm3    3.5-11.0     N          

  



             WBC)                                                

 

             RED BLOOD CELL (test code = 4.16 M/mm3   4.70-6.10    L            



             RBC)                                                

 

             HEMOGLOBIN (test code = HGB) 13.1 G/DL    10.4-14.9    N           

 

 

             HEMATOCRIT (test code = HCT) 41.8 %       31.5-44.1    N           

 

 

             MEAN CELL VOLUME (test code = 100.5 Fl     84.5-98.6    H          

  



             MCV)                                                

 

             MEAN CELL HGB (test code = MCH) 31.5 pg      27.0-34.2    N        

    

 

             MEAN CELL HGB CONCETRATION 31.3 G/DL    31.5-34.0    L            



             (test code = MCHC)                                        

 

             RED CELL DISTRIBUTION WIDTH 13.8 SD      11.5-14.5    N            



             (test code = RDW)                                        

 

             PLATELET COUNT (test code = 140 K/mm3    150-450      L            



             PLT)                                                

 

             MEAN PLATELET VOLUME (test code 10.20 fL     7.0-10.5     N        

    



             = MPV)                                              

 

             NEUTROPHIL % (test code = NT%) 71.8 %       40-76        N         

   

 

             IMMATURE GRANULOCYTE % (test 1.3 %        0.0-5.0      N           

 



             code = IG%)                                         

 

             LYMPHOCYTE % (test code = LY%) 18.2 %       20.5-51.1    L         

   

 

             MONOCYTE % (test code = MO%) 6.7 %        1.7-9.3      N           

 

 

             EOSINOPHIL % (test code = EO%) 1.3 %        0.0-6.0      N         

   

 

             BASOPHIL % (test code = BA%) 0.7 %        0.0-2.0      N           

 

 

             NUCLEATED RBC % (test code = 0.0 /100WBC% 0.0-1.0      N           

 



             NRBC%)                                              

 

             NEUTROPHIL # (test code = NT#) 6.5 K/mm3    1.8-7.6      N         

   

 

             IMMATURE GRANULOCYTE # (test 0.12 x10 3/uL 0.00-0.03    H          

  



             code = IG#)                                         

 

             LYMPHOCYTE # (test code = LY#) 1.7 K/mm3    0.6-3.2      N         

   

 

             MONOCYTE # (test code = MO#) 0.6 K/mm3    0.3-1.1      N           

 

 

             EOSINOPHIL # (test code = EO#) 0.1 K/mm3    0.0-0.4      N         

   

 

             BASOPHIL # (test code = BA#) 0.1 K/mm3    0.0-0.1      N           

 

 

             NUCLEATED RBC # (test code = 0.0 K/mm3    0.0-0.1      N           

 



             NRBC#)                                              

 

             MANUAL DIFF REQUIRED (test code NO DIFF/SCN  CRITERIA              

    



             = MDIFF)                                            



SARS-CoV-2 (COVID-19) RNA [Presence] in Respiratory specimen by EDWARD with probe 
[id number] 23:10:10





             Test Item    Value        Reference Range Interpretation Comments

 

             SARS coronavirus RNA [Presence] Not detected                       

    



             in Isolate by EDWARD with probe                                       

 



             detection (test code = 46367-7)                                    

    

 

             Whether patient is employed in a No                                

     



             healthcare setting (test code =                                    

    



             26094-9)                                            

 

             Whether the patient has symptoms Yes                               

     



             related to condition of interest                                   

     



             (test code = 00167-7)                                        

 

             Whether the patient was No                                     



             hospitalized for condition of                                      

  



             interest (test code = 79109-3)                                     

   

 

             Whether the patient was admitted No                                

     



             to intensive care unit (ICU) for                                   

     



             condition of interest (test code                                   

     



             = 59283-4)                                          

 

             Whether patient resides in a No                                    

 



             congregate care setting (test                                      

  



             code = 90905-0)                                        

 

             Pregnancy status (test code = No                                   

  



             09523-9)                                            

 

             Date and time of symptom onset Unknown                             

   



             (test code = 92507-6)                                        



[facility] 
Respiratory specimen by EDWARD with probe [id number] 23:10:10





             Test Item    Value        Reference Range Interpretation Comments

 

             SARS-CoV-2 (COVID-19) RNA Not detected                           



             [Presence] in Respiratory                                        



             specimen by EDWARD with probe                                        



             detection (test code = 95410-9)                                    

    

 

             Whether patient is employed in a No                                

     



             healthcare setting (test code =                                    

    



             84384-7)                                            

 

             Whether the patient has symptoms Yes                               

     



             related to condition of interest                                   

     



             (test code = 29601-7)                                        

 

             Whether the patient was No                                     



             hospitalized for condition of                                      

  



             interest (test code = 94978-5)                                     

   

 

             Whether the patient was admitted No                                

     



             to intensive care unit (ICU) for                                   

     



             condition of interest (test code                                   

     



             = 37384-0)                                          

 

             Whether patient resides in a No                                    

 



             congregate care setting (test                                      

  



             code = 62530-2)                                        

 

             Pregnancy status (test code = No                                   

  



             75420-6)                                            

 

             Date and time of symptom onset Unknown                             

   



             (test code = 07627-5)                                        



[facility] [Presence] in 
Respiratory specimen by EDWARD with probe [id number] 22:34:30





             Test Item    Value        Reference Range Interpretation Comments

 

             SARS-CoV-2 (COVID-19) RNA Not detected Not-Detected              



             [Presence] in Respiratory                                        



             specimen by EDWARD with probe                                        



             detection (test code = 05385-4)                                    

    

 

             Whether patient is employed in a                                   

     



             healthcare setting (test code =                                    

    



             23359-5)                                            

 

             Whether the patient has symptoms                                   

     



             related to condition of interest                                   

     



             (test code = 00674-6)                                        

 

             Patient was hospitalized because                                   

     



             of this condition (test code =                                     

   



             14060-9)                                            

 

             Whether the patient was admitted                                   

     



             to intensive care unit (ICU) for                                   

     



             condition of interest (test code                                   

     



             = 42107-5)                                          

 

             Whether patient resides in a                                       

 



             congregate care setting (test                                      

  



             code = 47926-6)                                        



[facility] [Presence] in 
Respiratory specimen by EDWARD with probe [id number] 22:35:42





             Test Item    Value        Reference Range Interpretation Comments

 

             SARS-CoV-2 (COVID-19) RNA Not detected Not-Detected              



             [Presence] in Respiratory                                        



             specimen by EDWARD with probe                                        



             detection (test code = 32633-0)                                    

    

 

             Whether patient is employed in a                                   

     



             healthcare setting (test code =                                    

    



             70986-3)                                            

 

             Whether the patient has symptoms                                   

     



             related to condition of interest                                   

     



             (test code = 02709-7)                                        

 

             Patient was hospitalized because                                   

     



             of this condition (test code =                                     

   



             17670-1)                                            

 

             Whether the patient was admitted                                   

     



             to intensive care unit (ICU) for                                   

     



             condition of interest (test code                                   

     



             = 08228-1)                                          

 

             Whether patient resides in a                                       

 



             congregate care setting (test                                      

  



             code = 33197-4)                                        



[facility] [Presence] in 
Respiratory specimen by EDWARD with probe [id number] 22:46:10





             Test Item    Value        Reference Range Interpretation Comments

 

             SARS-CoV-2 (COVID-19) RNA Not detected Not-Detected              



             [Presence] in Respiratory                                        



             specimen by EDWARD with probe                                        



             detection (test code = 01867-6)                                    

    

 

             Whether patient is employed in a                                   

     



             healthcare setting (test code =                                    

    



             76503-0)                                            

 

             Whether the patient has symptoms                                   

     



             related to condition of interest                                   

     



             (test code = 81755-2)                                        

 

             Patient was hospitalized because                                   

     



             of this condition (test code =                                     

   



             36816-9)                                            

 

             Whether the patient was admitted                                   

     



             to intensive care unit (ICU) for                                   

     



             condition of interest (test code                                   

     



             = 55268-4)                                          

 

             Whether patient resides in a                                       

 



             congregate care setting (test                                      

  



             code = 05589-0)                                        



[facility]-CoV-[facility] (COVID-19) RNA [Presence] in 
Respiratory specimen by EDWARD with probe [id number] 01:54:24





             Test Item    Value        Reference Range Interpretation Comments

 

             SARS-CoV-2 (COVID-19) RNA Not detected Not-Detected              



             [Presence] in Respiratory                                        



             specimen by EDWARD with probe                                        



             detection (test code = 29177-0)                                    

    

 

             Whether patient is employed in a                                   

     



             healthcare setting (test code =                                    

    



             53798-6)                                            

 

             Whether the patient has symptoms                                   

     



             related to condition of interest                                   

     



             (test code = 14148-2)                                        

 

             Patient was hospitalized because                                   

     



             of this condition (test code =                                     

   



             04425-8)                                            

 

             Whether the patient was admitted                                   

     



             to intensive care unit (ICU) for                                   

     



             condition of interest (test code                                   

     



             = 85213-3)                                          

 

             Whether patient resides in a                                       

 



             congregate care setting (test                                      

  



             code = 38824-7)                                        



[facility] [Presence] in 
Respiratory specimen by EDWARD with probe [id number] 21:44:06





             Test Item    Value        Reference Range Interpretation Comments

 

             SARS-CoV-2 (COVID-19) RNA Not detected Not-Detected              



             [Presence] in Respiratory                                        



             specimen by EDWARD with probe                                        



             detection (test code = 47304-3)                                    

    

 

             Whether patient is employed in a                                   

     



             healthcare setting (test code =                                    

    



             68468-7)                                            

 

             Whether the patient has symptoms                                   

     



             related to condition of interest                                   

     



             (test code = 04631-9)                                        

 

             Patient was hospitalized because                                   

     



             of this condition (test code =                                     

   



             73778-3)                                            

 

             Whether the patient was admitted                                   

     



             to intensive care unit (ICU) for                                   

     



             condition of interest (test code                                   

     



             = 31691-4)                                          

 

             Whether patient resides in a                                       

 



             congregate care setting (test                                      

  



             code = 40375-1)                                        



[facility] (C[facility]-19) RNA [Presence] in 
Respiratory specimen by EDWARD with probe [id number] 00:36:59





             Test Item    Value        Reference Range Interpretation Comments

 

             SARS-CoV-2 (COVID-19) RNA Not detected Not-Detected              



             [Presence] in Respiratory                                        



             specimen by EDWARD with probe                                        



             detection (test code = 53318-0)                                    

    



[facility] ([facility]) RNA [Presence] in 
Respiratory specimen by EDWARD with probe [id number] 17:35:35





             Test Item    Value        Reference Range Interpretation Comments

 

             SARS-CoV-2 (COVID-19) RNA Not detected Not-Detected              



             [Presence] in Respiratory                                        



             specimen by EDWARD with probe                                        



             detection (test code = 77414-5)                                    

    



[facility] [Presence] in 
Respiratory specimen by EDWARD with probe [id number] 18:03:30





             Test Item    Value        Reference Range Interpretation Comments

 

             SARS-CoV-2 (COVID-19) RNA Not detected Not-Detected              



             [Presence] in Respiratory                                        



             specimen by EDWARD with probe                                        



             detection (test code = 80985-4)                                    

    



[facility] ([facility] RNA [Presence] in 
Respiratory specimen by EDWARD with probe [id number] 10:06:03





             Test Item    Value        Reference Range Interpretation Comments

 

             SARS-CoV-2 (COVID-19) RNA Not detected Not-Detected              



             [Presence] in Respiratory                                        



             specimen by EDWARD with probe                                        



             detection (test code = 58906-0)                                    

    



[facility] [facility] [Presence] in 
Respiratory specimen by EDWARD with probe [id number] 05:11:58





             Test Item    Value        Reference Range Interpretation Comments

 

             SARS-CoV-2 (COVID-19) RNA Not detected Not-Detected              



             [Presence] in Respiratory                                        



             specimen by EDWARD with probe                                        



             detection (test code = 13173-5)                                    

    



[facility] [Presence] in 
Respiratory specimen by EDWARD with probe [id number] 03:34:16





             Test Item    Value        Reference Range Interpretation Comments

 

             SARS-CoV-2 (COVID-19) RNA Not detected Not-Detected              



             [Presence] in Respiratory                                        



             specimen by EDWARD with probe                                        



             detection (test code = 07901-0)                                    

    



[facility] [place] SYUQBTQFYHBG-KqZ-2 (COVID-19) RNA [Presence] in 
Respiratory specimen by EDWARD with probe [id number] 10:06:41





             Test Item    Value        Reference Range Interpretation Comments

 

             SARS-CoV-2 (COVID-19) RNA Not detected Not-Detected              



             [Presence] in Respiratory                                        



             specimen by EDWARD with probe                                        



             detection (test code = 29615-4)                                    

    



[facility]





             Test Item    Value        Reference Range Interpretation Comments

 

             Chol (test code = Chol) 129                                    



[facility]





             Test Item    Value        Reference Range Interpretation Comments

 

             HDL (test code = HDL) 37                                     



[facility]2020-10-14 12:33:00





             Test Item    Value        Reference Range Interpretation Comments

 

             Trig (test code = Trig) 76                                     



[facility]2020-10-14 12:33:00





             Test Item    Value        Reference Range Interpretation Comments

 

             LDL (Calculated) (test code = LDL 76                               

      



             (Calculated))                                        



[facility]2020-10-14 12:33:00





             Test Item    Value        Reference Range Interpretation Comments

 

             CHD Risk (test code = CHD Risk) 3.5                                

    



[facility]





             Test Item    Value        Reference Range Interpretation Comments

 

             Non HDL Chol (test code = Non HDL Chol) 92                         

            



[facility]2020-10-14 12:33:00





             Test Item    Value        Reference Range Interpretation Comments

 

             Chol (test code = Chol) 129                                    



[facility]2020-10-14 12:33:00





             Test Item    Value        Reference Range Interpretation Comments

 

             HDL (test code = HDL) 37                                     



[facility]2020-10-14 12:33:00





             Test Item    Value        Reference Range Interpretation Comments

 

             Trig (test code = Trig) 76                                     



[facility]2020-10-14 12:33:00





             Test Item    Value        Reference Range Interpretation Comments

 

             LDL (Calculated) (test code = LDL 76                               

      



             (Calculated))                                        



[facility]2020-10-14 12:33:00





             Test Item    Value        Reference Range Interpretation Comments

 

             CHD Risk (test code = CHD Risk) 3.5                                

    



[facility]





             Test Item    Value        Reference Range Interpretation Comments

 

             Non HDL Chol (test code = Non HDL Chol) 92                         

            



[facility]2020-10-14 12:33:00





             Test Item    Value        Reference Range Interpretation Comments

 

             Chol (test code = Chol) 129                                    



[facility]2020-10-14 12:33:00





             Test Item    Value        Reference Range Interpretation Comments

 

             HDL (test code = HDL) 37                                     



[facility]2020-10-14 12:33:00





             Test Item    Value        Reference Range Interpretation Comments

 

             Trig (test code = Trig) 76                                     



[facility]2020-10-14 12:33:00





             Test Item    Value        Reference Range Interpretation Comments

 

             LDL (Calculated) (test code = LDL 76                               

      



             (Calculated))                                        



[facility]-10-14 12:33:00





             Test Item    Value        Reference Range Interpretation Comments

 

             CHD Risk (test code = CHD Risk) 3.5                                

    



[facility]-10-14 12:33:00





             Test Item    Value        Reference Range Interpretation Comments

 

             Non HDL Chol (test code = Non HDL Chol) 92                         

            



[facility]-10-14 12:33:00





             Test Item    Value        Reference Range Interpretation Comments

 

             Chol (test code = Chol) 129                                    



[facility]2020-10-14 12:33:00





             Test Item    Value        Reference Range Interpretation Comments

 

             HDL (test code = HDL) 37                                     



[facility]-10-14 12:33:00





             Test Item    Value        Reference Range Interpretation Comments

 

             Trig (test code = Trig) 76                                     



[facility]2020-10-14 12:33:00





             Test Item    Value        Reference Range Interpretation Comments

 

             LDL (Calculated) (test code = LDL 76                               

      



             (Calculated))                                        



[facility]-10-14 12:33:00





             Test Item    Value        Reference Range Interpretation Comments

 

             CHD Risk (test code = CHD Risk) 3.5                                

    



[facility]2020-10-14 12:33:00





             Test Item    Value        Reference Range Interpretation Comments

 

             Non HDL Chol (test code = Non HDL Chol) 92                         

            



[facility]-10-14 12:33:00





             Test Item    Value        Reference Range Interpretation Comments

 

             Chol (test code = Chol) 129                                    



[facility]-10-14 12:33:00





             Test Item    Value        Reference Range Interpretation Comments

 

             HDL (test code = HDL) 37                                     



[facility]-10-14 12:33:00





             Test Item    Value        Reference Range Interpretation Comments

 

             Trig (test code = Trig) 76                                     



[facility]2020-10-14 12:33:00





             Test Item    Value        Reference Range Interpretation Comments

 

             LDL (Calculated) (test code = LDL 76                               

      



             (Calculated))                                        



[facility] 12[facility]:00





             Test Item    Value        Reference Range Interpretation Comments

 

             CHD Risk (test code = CHD Risk) 3.5                                

    



[facility] 12:33:00





             Test Item    Value        Reference Range Interpretation Comments

 

             Non HDL Chol (test code = Non HDL Chol) 92                         

            



[facility]2020-10-14 12:33:00





             Test Item    Value        Reference Range Interpretation Comments

 

             Chol (test code = Chol) 129                                    



[facility] 12:33:00





             Test Item    Value        Reference Range Interpretation Comments

 

             HDL (test code = HDL) 37                                     



[facility] 12:33:00





             Test Item    Value        Reference Range Interpretation Comments

 

             Trig (test code = Trig) 76                                     



[facility]2020-10-14 12:33:00





             Test Item    Value        Reference Range Interpretation Comments

 

             LDL (Calculated) (test code = LDL 76                               

      



             (Calculated))                                        



[facility]2020-10-14 12:33:00





             Test Item    Value        Reference Range Interpretation Comments

 

             CHD Risk (test code = CHD Risk) 3.5                                

    



[facility] 12:33:00





             Test Item    Value        Reference Range Interpretation Comments

 

             Non HDL Chol (test code = Non HDL Chol) 92                         

            



[facility]2020-10-14 12:33:00





             Test Item    Value        Reference Range Interpretation Comments

 

             Chol (test code = Chol) 129                                    



[facility] 12:33:00





             Test Item    Value        Reference Range Interpretation Comments

 

             HDL (test code = HDL) 37                                     



[facility] 12:33:00





             Test Item    Value        Reference Range Interpretation Comments

 

             Trig (test code = Trig) 76                                     



[facility] 12:33:00





             Test Item    Value        Reference Range Interpretation Comments

 

             LDL (Calculated) (test code = LDL 76                               

      



             (Calculated))                                        



[facility]2020-10-14 12:33:00





             Test Item    Value        Reference Range Interpretation Comments

 

             CHD Risk (test code = CHD Risk) 3.5                                

    



[facility]





             Test Item    Value        Reference Range Interpretation Comments

 

             Non HDL Chol (test code = Non HDL Chol) 92                         

            



[facility]2020-10-14 12:33:00





             Test Item    Value        Reference Range Interpretation Comments

 

             Chol (test code = Chol) 129                                    



[facility]2020-10-14 12:33:00





             Test Item    Value        Reference Range Interpretation Comments

 

             HDL (test code = HDL) 37                                     



[facility]2020-10-14 12:33:00





             Test Item    Value        Reference Range Interpretation Comments

 

             Trig (test code = Trig) 76                                     



[facility]2020-10-14 12:33:00





             Test Item    Value        Reference Range Interpretation Comments

 

             LDL (Calculated) (test code = LDL 76                               

      



             (Calculated))                                        



[facility]-10-14 12:33:00





             Test Item    Value        Reference Range Interpretation Comments

 

             CHD Risk (test code = CHD Risk) 3.5                                

    



[facility]-10[facility] 12:33:00





             Test Item    Value        Reference Range Interpretation Comments

 

             Non HDL Chol (test code = Non HDL Chol) 92                         

            



[facility]-10-14 12:33:00





             Test Item    Value        Reference Range Interpretation Comments

 

             Chol (test code = Chol) 129                                    



[facility]2020-10-14 12:33:00





             Test Item    Value        Reference Range Interpretation Comments

 

             HDL (test code = HDL) 37                                     



[facility]-10-14 12:33:00





             Test Item    Value        Reference Range Interpretation Comments

 

             Trig (test code = Trig) 76                                     



[facility]2020-10-14 12:33:00





             Test Item    Value        Reference Range Interpretation Comments

 

             LDL (Calculated) (test code = LDL 76                               

      



             (Calculated))                                        



[facility]-10-14 12:33:00





             Test Item    Value        Reference Range Interpretation Comments

 

             CHD Risk (test code = CHD Risk) 3.5                                

    



[facility]2020-10-14 12:33:00





             Test Item    Value        Reference Range Interpretation Comments

 

             Non HDL Chol (test code = Non HDL Chol) 92                         

            



[facility]-10-14 12:33:00





             Test Item    Value        Reference Range Interpretation Comments

 

             Chol (test code = Chol) 129                                    



[facility]-10-14 12:33:00





             Test Item    Value        Reference Range Interpretation Comments

 

             HDL (test code = HDL) 37                                     



[facility]-10-14 12:33:00





             Test Item    Value        Reference Range Interpretation Comments

 

             Trig (test code = Trig) 76                                     



[facility]2020-10-14 12:33:00





             Test Item    Value        Reference Range Interpretation Comments

 

             LDL (Calculated) (test code = LDL 76                               

      



             (Calculated))                                        



[facility] 12[facility]:00





             Test Item    Value        Reference Range Interpretation Comments

 

             CHD Risk (test code = CHD Risk) 3.5                                

    



[facility] [address]00





             Test Item    Value        Reference Range Interpretation Comments

 

             Non HDL Chol (test code = Non HDL Chol) 92                         

            



[facility]2020-08-06 14:47:00





             Test Item    Value        Reference Range Interpretation Comments

 

             Vitamin D, 25-OH, Total (test code = 37                      

         



             Vitamin D, 25-OH, Total)                                        



[place] 239490-08-06 14:47:00





             Test Item    Value        Reference Range Interpretation Comments

 

             U Creat mg/dL (test code = U Creat 114                       

       



             mg/dL)                                              



[place] 239490-08-06 14:47:00





             Test Item    Value        Reference Range Interpretation Comments

 

             U Prot/Creat (test code = U Prot/Creat) 430                  

            



[facility]2020-08-06 14:47:00





             Test Item    Value        Reference Range Interpretation Comments

 

             U Prot/Creat (test code = U 0.430 1      0.021-0.161               



             Prot/Creat)                                         



[facility]2020-08-06 14:47:00





             Test Item    Value        Reference Range Interpretation Comments

 

             U Protein (test code = U Protein) 49           5-24                

      



[facility]2020-08-06 14:47:00





             Test Item    Value        Reference Range Interpretation Comments

 

             Glucose Lvl (test code = Glucose Lvl) 103          65-99           

          



[place] 239490-08-06 14:47:00





             Test Item    Value        Reference Range Interpretation Comments

 

             BUN (test code = BUN) 24           7-25                      



[place] 239490-08-06 14:47:00





             Test Item    Value        Reference Range Interpretation Comments

 

             Creatinine Lvl (test code = Creatinine 1.32         0.50-0.99      

           



             Lvl)                                                



[place] 239490-08-06 14:47:00





             Test Item    Value        Reference Range Interpretation Comments

 

             eGFR NON-AFR. AMERICAN (test code = 43                             

        



             eGFR NON-AFR. AMERICAN)                                        



[facility]2020-08-06 14:47:00





             Test Item    Value        Reference Range Interpretation Comments

 

             eGFR  (test code = eGFR 50                         

            



             )                                        



[place] 239490-08-06 14:47:00





             Test Item    Value        Reference Range Interpretation Comments

 

             B/C Ratio (test code = B/C Ratio) 18           6-22                

      



[place] 239490-08-06 14:47:00





             Test Item    Value        Reference Range Interpretation Comments

 

             Sodium Lvl (test code = Sodium Lvl) 138          135-146           

        



[place] 239490-08-06 14:47:00





             Test Item    Value        Reference Range Interpretation Comments

 

             Potassium Lvl (test code = Potassium 4.4          3.5-5.3          

         



             Lvl)                                                



[place] 239490-08-06 14:47:00





             Test Item    Value        Reference Range Interpretation Comments

 

             Chloride Lvl (test code = Chloride Lvl) 102                  

            



[place] 239490-08-06 14:47:00





             Test Item    Value        Reference Range Interpretation Comments

 

             CO2 (test code = CO2) 30           20-32                     



[place] 239490-08-06 14:47:00





             Test Item    Value        Reference Range Interpretation Comments

 

             Calcium Lvl (test code = Calcium Lvl) 9.4          8.6-10.4        

          



[place] 239490-08-06 14:47:00





             Test Item    Value        Reference Range Interpretation Comments

 

             Phosphorus (test code = Phosphorus) 4.8          2.5-4.5           

        



[place] 239490-08-06 14:47:00





             Test Item    Value        Reference Range Interpretation Comments

 

             Albumin Lvl (test code = Albumin Lvl) 3.9          3.6-5.1         

          



[place] [address]-08-06 14:47:00





             Test Item    Value        Reference Range Interpretation Comments

 

             Plt Count Estimated (test code = DECREASED                         

     



             Plt Count Estimated)                                        



[place] [address]-08-06 14:47:00





             Test Item    Value        Reference Range Interpretation Comments

 

             WBC X 10x3 (test code = WBC X 10x3) 7.2          3.8-10.8          

        



[place] [address]-08-06 14:47:00





             Test Item    Value        Reference Range Interpretation Comments

 

             RBC X 10x6 (test code = RBC X 10x6) 3.71         3.80-5.10         

        



[place] [address]-08-06 14:47:00





             Test Item    Value        Reference Range Interpretation Comments

 

             Hgb (test code = Hgb) 10.7         11.7-15.5                 



[place] [address]-08-06 14:47:00





             Test Item    Value        Reference Range Interpretation Comments

 

             Hct (test code = Hct) 33.9         35.0-45.0                 



[facility]





             Test Item    Value        Reference Range Interpretation Comments

 

             MCV (test code = MCV) 91.4         80.0-100.0                



[place] 981740-08-06 14:47:00





             Test Item    Value        Reference Range Interpretation Comments

 

             MCH (test code = MCH) 28.8 pg      27.0-33.0                 



[facility]xgeelzRUJPRFUJMD8523-55-32 14:47:00





             Test Item    Value        Reference Range Interpretation Comments

 

             MCHC (test code = MCHC) 31.6         32.0-36.0                 



[facility]MmxdbskSPSBBAZLJP9054-60-96 14:47:00





             Test Item    Value        Reference Range Interpretation Comments

 

             RDW (test code = RDW) 13.9         11.0-15.0                 



[place] 981740-08-06 14:47:00





             Test Item    Value        Reference Range Interpretation Comments

 

             Platelet (test code = Platelet) 110          140-400               

    



[facility]landMmovnziLPWGOZCZKV5524-30-80 14:47:00





             Test Item    Value        Reference Range Interpretation Comments

 

             MPV (test code = MPV) 11.7         7.5-12.5                  



[facility]landHycmshuTWWPOIUYAE0028-66-15 14:47:00





             Test Item    Value        Reference Range Interpretation Comments

 

             Neutrophils # (test code = Neutrophils 5414         2803-9149      

           



             #)                                                  



[facility]landClkbecxGZOHFQBJBA3847-19-72 14:47:00





             Test Item    Value        Reference Range Interpretation Comments

 

             Lymphocytes # (test code = Lymphocytes 1152         850-3900       

           



             #)                                                  



[facility]landQcxxmaiTBSOLNTNVL3790-17-19 14:47:00





             Test Item    Value        Reference Range Interpretation Comments

 

             Monocytes # (test code = Monocytes #) 461          200-950         

          



[place] 981740-08-06 14:47:00





             Test Item    Value        Reference Range Interpretation Comments

 

             Eosinophils # (test code = Eosinophils 122                   

           



             #)                                                  



[facility]landDidggneLZDDVFSLEJ1566-07-74 14:47:00





             Test Item    Value        Reference Range Interpretation Comments

 

             Basophils # (test code 50           See_Comment                [Aut

omated message] The



             = Basophils #)                                        system which 

generated



                                                                 this result tra

nsmitted



                                                                 reference range

: <=200.



                                                                 The reference r

cheyenne was



                                                                 not used to int

erpret



                                                                 this result as



                                                                 normal/abnormal

.



[facility]OVME4896[facility] 14[facility]:00





             Test Item    Value        Reference Range Interpretation Comments

 

             Segs (test code = Segs) 75.2                                   



[facility]XjjevptKWEJWFOUUA4734-33-52 14:47:00





             Test Item    Value        Reference Range Interpretation Comments

 

             Lymphocytes (test code = Lymphocytes) 16.0                         

          



[facility]





             Test Item    Value        Reference Range Interpretation Comments

 

             Monocytes (test code = Monocytes) 6.4                              

      



[facility]rm[facility]brADRTFBYHWC4861-12-99 14:47:00





             Test Item    Value        Reference Range Interpretation Comments

 

             Eosinophils (test code = Eosinophils) 1.7                          

          



[facility]ItggwkzHHTZRBVHPH0116-47-61 14:47:00





             Test Item    Value        Reference Range Interpretation Comments

 

             Basophils (test code = Basophils) 0.7                              

      



[facility] [id number] 14:47:00





             Test Item    Value        Reference Range Interpretation Comments

 

             Result 2 (Urine Culture) See Result Comment                        

   



             (test code = Result 2                                        



             (Urine Culture))                                        



[facility] AND [id number] 14:47:00





             Test Item    Value        Reference Range Interpretation Comments

 

             UA Color (test code = UA Color) YELLOW                             

    



[facility] AND [id number] 14:47:00





             Test Item    Value        Reference Range Interpretation Comments

 

             UA Turbidity (test code = UA CLOUDY                                

 



             Turbidity)                                          



[facility] AND [id number] 14:47:00





             Test Item    Value        Reference Range Interpretation Comments

 

             UA Spec Grav (test code = UA Spec 1.017 1      1.001-1.035         

      



             Grav)                                               



[facility] AND [id number] 14:47:00





             Test Item    Value        Reference Range Interpretation Comments

 

             UA pH (test code = UA pH) 5.5 1        5.0-8.0                   



[facility] AND [id number] 14:47:00





             Test Item    Value        Reference Range Interpretation Comments

 

             UA Glucose (test code = UA Glucose) NEGATIVE                       

        



[facility] AND [id number] 14:47:00





             Test Item    Value        Reference Range Interpretation Comments

 

             UA Bili (test code = UA Bili) NEGATIVE                             

  



[facility] AND [id number] 14:47:00





             Test Item    Value        Reference Range Interpretation Comments

 

             UA Ketones (test code = UA Ketones) NEGATIVE                       

        



[facility] AND [id number] 14:47:00





             Test Item    Value        Reference Range Interpretation Comments

 

             UA Blood (test code = UA Blood) 1+                                 

    



[facility] [id number] 14:47:00





             Test Item    Value        Reference Range Interpretation Comments

 

             UA Protein (test code = UA Protein) 1+                             

        



[facility] [id number] 14:47:00





             Test Item    Value        Reference Range Interpretation Comments

 

             UA Nitrite (test code = UA Nitrite) POSITIVE                       

        



[facility] [id number] 14:47:00





             Test Item    Value        Reference Range Interpretation Comments

 

             UA Leuk Est (test code = UA Leuk Est) 2+                           

          



[facility] [id number] 14:47:00





             Test Item    Value        Reference Range Interpretation Comments

 

             UA WBC (test code = UA WBC) > OR = 60                              



[facility] [id number] 14:47:00





             Test Item    Value        Reference Range Interpretation Comments

 

             UA RBC (test code = UA RBC) 0-2                                    



[facility] [id number] 14:47:00





             Test Item    Value        Reference Range Interpretation Comments

 

             UA Sq Epi (test code = UA Sq Epi) NONE SEEN                        

      



[facility] [id number] 14:47:00





             Test Item    Value        Reference Range Interpretation Comments

 

             UA Bacteria (test code = UA Bacteria) MANY                         

          



[facility] [id number] 14:47:00





             Test Item    Value        Reference Range Interpretation Comments

 

             UA Hyal Cast (test code = UA Hyal NONE SEEN                        

      



             Cast)                                               



[facility] AND [id number] 14:47:00





             Test Item    Value        Reference Range Interpretation Comments

 

             UA Reflex (test code CULTURE INDICATED -                           



             = UA Reflex) RESULTS TO FOLLOW                           



[facility] [id number] 14:47:00





             Test Item    Value        Reference Range Interpretation Comments

 

             U Creat mg/dL (test code = U Creat 114                       

       



             mg/dL)                                              



[facility] [id number] 14:47:00





             Test Item    Value        Reference Range Interpretation Comments

 

             U Alb (test code = U Alb) 16.7                                   



[facility] [id number] 14:47:00





             Test Item    Value        Reference Range Interpretation Comments

 

             U Alb/Crea (test code = U Alb/Crea) 146                            

        



[facility] [id number] 14:47:00





             Test Item    Value        Reference Range Interpretation Comments

 

             Vitamin D, 25-OH, Total (test code = 37                      

         



             Vitamin D, 25-OH, Total)                                        



[place] 239490-08-06 14:47:00





             Test Item    Value        Reference Range Interpretation Comments

 

             U Creat mg/dL (test code = U Creat 114                       

       



             mg/dL)                                              



[facility]2020-08-06 14:47:00





             Test Item    Value        Reference Range Interpretation Comments

 

             U Prot/Creat (test code = U Prot/Creat) 430                  

            



[place] 239490-08-06 14:47:00





             Test Item    Value        Reference Range Interpretation Comments

 

             U Prot/Creat (test code = U 0.430 1      0.021-0.161               



             Prot/Creat)                                         



[place] 239490-08-06 14:47:00





             Test Item    Value        Reference Range Interpretation Comments

 

             U Protein (test code = U Protein) 49           5-24                

      



[place] 239490-08-06 14:47:00





             Test Item    Value        Reference Range Interpretation Comments

 

             Glucose Lvl (test code = Glucose Lvl) 103          65-99           

          



[place] 239490-08-06 14:47:00





             Test Item    Value        Reference Range Interpretation Comments

 

             BUN (test code = BUN) 24           7-25                      



[place] 239490-08-06 14:47:00





             Test Item    Value        Reference Range Interpretation Comments

 

             Creatinine Lvl (test code = Creatinine 1.32         0.50-0.99      

           



             Lvl)                                                



[facility]2020-08-06 14:47:00





             Test Item    Value        Reference Range Interpretation Comments

 

             eGFR NON-AFR. AMERICAN (test code = 43                             

        



             eGFR NON-AFR. AMERICAN)                                        



[facility]2020-08-06 14:47:00





             Test Item    Value        Reference Range Interpretation Comments

 

             eGFR  (test code = eGFR 50                         

            



             )                                        



[place] 239490-08-06 14:47:00





             Test Item    Value        Reference Range Interpretation Comments

 

             B/C Ratio (test code = B/C Ratio) 18           6-22                

      



[place] 239490-08-06 14:47:00





             Test Item    Value        Reference Range Interpretation Comments

 

             Sodium Lvl (test code = Sodium Lvl) 138          135-146           

        



[place] 239490-08-06 14:47:00





             Test Item    Value        Reference Range Interpretation Comments

 

             Potassium Lvl (test code = Potassium 4.4          3.5-5.3          

         



             Lvl)                                                



[place] 239490-08-06 14:47:00





             Test Item    Value        Reference Range Interpretation Comments

 

             Chloride Lvl (test code = Chloride Lvl) 102                  

            



[place] 239490-08-06 14:47:00





             Test Item    Value        Reference Range Interpretation Comments

 

             CO2 (test code = CO2) 30           20-32                     



[place] 239490-08-06 14:47:00





             Test Item    Value        Reference Range Interpretation Comments

 

             Calcium Lvl (test code = Calcium Lvl) 9.4          8.6-10.4        

          



[place] [address]-08-06 14:47:00





             Test Item    Value        Reference Range Interpretation Comments

 

             Phosphorus (test code = Phosphorus) 4.8          2.5-4.5           

        



[place] 239490-08-06 14:47:00





             Test Item    Value        Reference Range Interpretation Comments

 

             Albumin Lvl (test code = Albumin Lvl) 3.9          3.6-5.1         

          



[place] [address]-08-06 14:47:00





             Test Item    Value        Reference Range Interpretation Comments

 

             Plt Count Estimated (test code = DECREASED                         

     



             Plt Count Estimated)                                        



[place] [address]-08-06 14:47:00





             Test Item    Value        Reference Range Interpretation Comments

 

             WBC X 10x3 (test code = WBC X 10x3) 7.2          3.8-10.8          

        



[place] [address]-08-06 14:47:00





             Test Item    Value        Reference Range Interpretation Comments

 

             RBC X 10x6 (test code = RBC X 10x6) 3.71         3.80-5.10         

        



[place] [address]-08-06 14:47:00





             Test Item    Value        Reference Range Interpretation Comments

 

             Hgb (test code = Hgb) 10.7         11.7-15.5                 



[place] [address]-08-06 14:47:00





             Test Item    Value        Reference Range Interpretation Comments

 

             Hct (test code = Hct) 33.9         35.0-45.0                 



[place] [address]-08-06 14:47:00





             Test Item    Value        Reference Range Interpretation Comments

 

             MCV (test code = MCV) 91.4         80.0-100.0                



[place] [address]-08-06 14:47:00





             Test Item    Value        Reference Range Interpretation Comments

 

             MCH (test code = MCH) 28.8 pg      27.0-33.0                 



[place] [address]-08-06 14:47:00





             Test Item    Value        Reference Range Interpretation Comments

 

             MCHC (test code = MCHC) 31.6         32.0-36.0                 



[facility]xqwmtmFMRCHLMJIT2622-76-55 14:47:00





             Test Item    Value        Reference Range Interpretation Comments

 

             RDW (test code = RDW) 13.9         11.0-15.0                 



[facility]landAaofsclYKFSXHKUFD5096-91-44 14:47:00





             Test Item    Value        Reference Range Interpretation Comments

 

             Platelet (test code = Platelet) 110          140-400               

    



[facility]landGkikkfgMVTQRSZWWD5590-34-70 14:47:00





             Test Item    Value        Reference Range Interpretation Comments

 

             MPV (test code = MPV) 11.7         7.5-12.5                  



[facility]landMewxkpyOBIFRBXMEL2966-15-10 14:47:00





             Test Item    Value        Reference Range Interpretation Comments

 

             Neutrophils # (test code = Neutrophils 5414         1633-9286      

           



             #)                                                  



[facility]landBhznbmjAULYLUWCWV9088-72-57 14:47:00





             Test Item    Value        Reference Range Interpretation Comments

 

             Lymphocytes # (test code = Lymphocytes 1152         850-3900       

           



             #)                                                  



[facility]landNkywdqdBNHKHBDVGN0673-77-44 14:47:00





             Test Item    Value        Reference Range Interpretation Comments

 

             Monocytes # (test code = Monocytes #) 461          200-950         

          



[facility]landGduvyagALKJPMWAWA2226-23-40 14:47:00





             Test Item    Value        Reference Range Interpretation Comments

 

             Eosinophils # (test code = Eosinophils 122                   

           



             #)                                                  



[facility]landQtebildNUSHPTRWHV7323-67-17 14:47:00





             Test Item    Value        Reference Range Interpretation Comments

 

             Basophils # (test code 50           See_Comment                [Aut

omated message] The



             = Basophils #)                                        system which 

generated



                                                                 this result tra

nsmitted



                                                                 reference range

: <=200.



                                                                 The reference r

cheyenne was



                                                                 not used to int

erpret



                                                                 this result as



                                                                 normal/abnormal

.



[facility]landIhovejtTGEJTZUXNN6114-57-36 14:47:00





             Test Item    Value        Reference Range Interpretation Comments

 

             Segs (test code = Segs) 75.2                                   



[facility]landXdtpqfdIOBLIESPCR8382-20-33 14:47:00





             Test Item    Value        Reference Range Interpretation Comments

 

             Lymphocytes (test code = Lymphocytes) 16.0                         

          



[facility]landKlnxtifKLJEIEOSNQ0538-04-93 14:47:00





             Test Item    Value        Reference Range Interpretation Comments

 

             Monocytes (test code = Monocytes) 6.4                              

      



[facility]landAnpqeinLVRHSYZBME9311-01-31 14:47:00





             Test Item    Value        Reference Range Interpretation Comments

 

             Eosinophils (test code = Eosinophils) 1.7                          

          



[facility]landGpzvqolVEOEBZIJHT6599-67-20 14:47:00





             Test Item    Value        Reference Range Interpretation Comments

 

             Basophils (test code = Basophils) 0.7                              

      



[facility] [id number] 14:47:00





             Test Item    Value        Reference Range Interpretation Comments

 

             Result 2 (Urine Culture) See Result Comment                        

   



             (test code = Result 2                                        



             (Urine Culture))                                        



[facility] [id number] 14:47:00





             Test Item    Value        Reference Range Interpretation Comments

 

             UA Color (test code = UA Color) YELLOW                             

    



[facility] [id number] 14:47:00





             Test Item    Value        Reference Range Interpretation Comments

 

             UA Turbidity (test code = UA CLOUDY                                

 



             Turbidity)                                          



[facility] [id number] 14:47:00





             Test Item    Value        Reference Range Interpretation Comments

 

             UA Spec Grav (test code = UA Spec 1.017 1      1.001-1.035         

      



             Grav)                                               



[facility] [id number] 14:47:00





             Test Item    Value        Reference Range Interpretation Comments

 

             UA pH (test code = UA pH) 5.5 1        5.0-8.0                   



[facility] [id number] 14:47:00





             Test Item    Value        Reference Range Interpretation Comments

 

             UA Glucose (test code = UA Glucose) NEGATIVE                       

        



[facility] [id number] 14:47:00





             Test Item    Value        Reference Range Interpretation Comments

 

             UA Bili (test code = UA Bili) NEGATIVE                             

  



[facility] [id number] 14:47:00





             Test Item    Value        Reference Range Interpretation Comments

 

             UA Ketones (test code = UA Ketones) NEGATIVE                       

        



[facility] [id number] 14:47:00





             Test Item    Value        Reference Range Interpretation Comments

 

             UA Blood (test code = UA Blood) 1+                                 

    



[facility] [id number] 14:47:00





             Test Item    Value        Reference Range Interpretation Comments

 

             UA Protein (test code = UA Protein) 1+                             

        



[facility] [id number] 14:47:00





             Test Item    Value        Reference Range Interpretation Comments

 

             UA Nitrite (test code = UA Nitrite) POSITIVE                       

        



[facility] [id number] 14:47:00





             Test Item    Value        Reference Range Interpretation Comments

 

             UA Leuk Est (test code = UA Leuk Est) 2+                           

          



[facility] [id number] 14:47:00





             Test Item    Value        Reference Range Interpretation Comments

 

             UA WBC (test code = UA WBC) > OR = 60                              



[facility] AND [id number] 14:47:00





             Test Item    Value        Reference Range Interpretation Comments

 

             UA RBC (test code = UA RBC) 0-2                                    



[facility] AND [id number] 14:47:00





             Test Item    Value        Reference Range Interpretation Comments

 

             UA Sq Epi (test code = UA Sq Epi) NONE SEEN                        

      



[facility] AND [id number] 14:47:00





             Test Item    Value        Reference Range Interpretation Comments

 

             UA Bacteria (test code = UA Bacteria) MANY                         

          



[facility] AND [id number] 14:47:00





             Test Item    Value        Reference Range Interpretation Comments

 

             UA Hyal Cast (test code = UA Hyal NONE SEEN                        

      



             Cast)                                               



[facility] AND [id number] 14:47:00





             Test Item    Value        Reference Range Interpretation Comments

 

             UA Reflex (test code CULTURE INDICATED -                           



             = UA Reflex) RESULTS TO FOLLOW                           



[facility]2020-08-06 14:47:00





             Test Item    Value        Reference Range Interpretation Comments

 

             U Creat mg/dL (test code = U Creat 114                       

       



             mg/dL)                                              



[facility]2020-08-06 14:47:00





             Test Item    Value        Reference Range Interpretation Comments

 

             U Alb (test code = U Alb) 16.7                                   



[facility]2020-08-06 14:47:00





             Test Item    Value        Reference Range Interpretation Comments

 

             U Alb/Crea (test code = U Alb/Crea) 146                            

        



[facility]2020-08-06 14:47:00





             Test Item    Value        Reference Range Interpretation Comments

 

             Vitamin D, 25-OH, Total (test code = 37                      

         



             Vitamin D, 25-OH, Total)                                        



[facility]2020-08-06 14:47:00





             Test Item    Value        Reference Range Interpretation Comments

 

             U Creat mg/dL (test code = U Creat 114                       

       



             mg/dL)                                              



[facility]2020-08-06 14:47:00





             Test Item    Value        Reference Range Interpretation Comments

 

             U Prot/Creat (test code = U Prot/Creat) 430                  

            



[facility]2020-08-06 14:47:00





             Test Item    Value        Reference Range Interpretation Comments

 

             U Prot/Creat (test code = U 0.430 1      0.021-0.161               



             Prot/Creat)                                         



[place] 239490-08-06 14:47:00





             Test Item    Value        Reference Range Interpretation Comments

 

             U Protein (test code = U Protein) 49           5-24                

      



[place] 239490-08-06 14:47:00





             Test Item    Value        Reference Range Interpretation Comments

 

             Glucose Lvl (test code = Glucose Lvl) 103          65-99           

          



[place] 239490-08-06 14:47:00





             Test Item    Value        Reference Range Interpretation Comments

 

             BUN (test code = BUN) 24           7-25                      



[place] 239490-08-06 14:47:00





             Test Item    Value        Reference Range Interpretation Comments

 

             Creatinine Lvl (test code = Creatinine 1.32         0.50-0.99      

           



             Lvl)                                                



[place] 239490-08-06 14:47:00





             Test Item    Value        Reference Range Interpretation Comments

 

             eGFR NON-AFR. AMERICAN (test code = 43                             

        



             eGFR NON-AFR. AMERICAN)                                        



[facility]2020-08-06 14:47:00





             Test Item    Value        Reference Range Interpretation Comments

 

             eGFR  (test code = eGFR 50                         

            



             )                                        



[place] 239490-08-06 14:47:00





             Test Item    Value        Reference Range Interpretation Comments

 

             B/C Ratio (test code = B/C Ratio) 18           6-22                

      



[place] 239490-08-06 14:47:00





             Test Item    Value        Reference Range Interpretation Comments

 

             Sodium Lvl (test code = Sodium Lvl) 138          135-146           

        



[place] 239490-08-06 14:47:00





             Test Item    Value        Reference Range Interpretation Comments

 

             Potassium Lvl (test code = Potassium 4.4          3.5-5.3          

         



             Lvl)                                                



[place] 239490-08-06 14:47:00





             Test Item    Value        Reference Range Interpretation Comments

 

             Chloride Lvl (test code = Chloride Lvl) 102                  

            



[place] 239490-08-06 14:47:00





             Test Item    Value        Reference Range Interpretation Comments

 

             CO2 (test code = CO2) 30           20-32                     



[place] 239490-08-06 14:47:00





             Test Item    Value        Reference Range Interpretation Comments

 

             Calcium Lvl (test code = Calcium Lvl) 9.4          8.6-10.4        

          



[place] 239490-08-06 14:47:00





             Test Item    Value        Reference Range Interpretation Comments

 

             Phosphorus (test code = Phosphorus) 4.8          2.5-4.5           

        



[facility]2020-08-06 14:47:00





             Test Item    Value        Reference Range Interpretation Comments

 

             Albumin Lvl (test code = Albumin Lvl) 3.9          3.6-5.1         

          



[facility]yrabwSLAEBBJFOL1241-73-74 14:47:00





             Test Item    Value        Reference Range Interpretation Comments

 

             Plt Count Estimated (test code = DECREASED                         

     



             Plt Count Estimated)                                        



[facility]landAsdkyqdRZGFEOFEVC0758-98-31 14:47:00





             Test Item    Value        Reference Range Interpretation Comments

 

             WBC X 10x3 (test code = WBC X 10x3) 7.2          3.8-10.8          

        



[facility]landFeyqowkGYNFZLSKJN9576-33-14 14:47:00





             Test Item    Value        Reference Range Interpretation Comments

 

             RBC X 10x6 (test code = RBC X 10x6) 3.71         3.80-5.10         

        



[facility] PearlandIizvomoRCTQRUIFYL1772-84-01 14:47:00





             Test Item    Value        Reference Range Interpretation Comments

 

             Hgb (test code = Hgb) 10.7         11.7-15.5                 



[facility] PearlandTdfotudBWUZWPKBML0934-11-29 14:47:00





             Test Item    Value        Reference Range Interpretation Comments

 

             Hct (test code = Hct) 33.9         35.0-45.0                 



[facility] PearlandAgayadyEMTRLDMHOM1756-19-79 14:47:00





             Test Item    Value        Reference Range Interpretation Comments

 

             MCV (test code = MCV) 91.4         80.0-100.0                



[facility] PearlandKjistvjNVFCEXAJDW3384-42-46 14:47:00





             Test Item    Value        Reference Range Interpretation Comments

 

             MCH (test code = MCH) 28.8 pg      27.0-33.0                 



[facility] PearlandKtwoabzRTIMXKJGZY7623-04-73 14:47:00





             Test Item    Value        Reference Range Interpretation Comments

 

             MCHC (test code = MCHC) 31.6         32.0-36.0                 



[place] 981740-08-06 14:47:00





             Test Item    Value        Reference Range Interpretation Comments

 

             RDW (test code = RDW) 13.9         11.0-15.0                 



[place] 981740-08-06 14:47:00





             Test Item    Value        Reference Range Interpretation Comments

 

             Platelet (test code = Platelet) 110          140-400               

    



[facility] PearlandOqafaxeOBYENPYGMM1895-54-95 14:47:00





             Test Item    Value        Reference Range Interpretation Comments

 

             MPV (test code = MPV) 11.7         7.5-12.5                  



[facility]JC[facility]WSFQ2639[facility] 14[facility]47:00





             Test Item    Value        Reference Range Interpretation Comments

 

             Neutrophils # (test code = Neutrophils 5414         1006-7856      

           



             #)                                                  



[facility] AustinDzenrjxZYVXTKJDWL5833-17-19 14:47:00





             Test Item    Value        Reference Range Interpretation Comments

 

             Lymphocytes # (test code = Lymphocytes 1152         850-3900       

           



             #)                                                  



[facility] AustinRspadroQNOYXUFSVL1685-43-57 14:47:00





             Test Item    Value        Reference Range Interpretation Comments

 

             Monocytes # (test code = Monocytes #) 461          200-950         

          



[facility]AmkvfpyCGZOMDNVRK6826-00-17 14:47:00





             Test Item    Value        Reference Range Interpretation Comments

 

             Eosinophils # (test code = Eosinophils 122                   

           



             #)                                                  



[facility] HospitalOegrlbgBMGTTRQBJG4876-14-50 14:47:00





             Test Item    Value        Reference Range Interpretation Comments

 

             Basophils # (test code 50           See_Comment                [Aut

omated message] The



             = Basophils #)                                        system which 

generated



                                                                 this result tra

nsmitted



                                                                 reference range

: <=200.



                                                                 The reference r

cheyenne was



                                                                 not used to int

erpret



                                                                 this result as



                                                                 normal/abnormal

.



[facility] AustinKndooghQGDXXPZWBM4330-38-92 14:47:00





             Test Item    Value        Reference Range Interpretation Comments

 

             Segs (test code = Segs) 75.2                                   



[facility] HospitalTykrixjFSORYBLMNE6524-19-81 14:47:00





             Test Item    Value        Reference Range Interpretation Comments

 

             Lymphocytes (test code = Lymphocytes) 16.0                         

          



[facility] AustinBpsvylfYINDSONZJQ9252-64-08 14:47:00





             Test Item    Value        Reference Range Interpretation Comments

 

             Monocytes (test code = Monocytes) 6.4                              

      



[facility] AustinSmswuokDKRBRLDSNT3295-96-37 14:47:00





             Test Item    Value        Reference Range Interpretation Comments

 

             Eosinophils (test code = Eosinophils) 1.7                          

          



[facility] CenterRnzkhuiQXBVBEANPM9697-09-22 14:47:00





             Test Item    Value        Reference Range Interpretation Comments

 

             Basophils (test code = Basophils) 0.7                              

      



[facility] BZENRXP9877-97-26 14:47:00





             Test Item    Value        Reference Range Interpretation Comments

 

             Result 2 (Urine Culture) See Result Comment                        

   



             (test code = Result 2                                        



             (Urine Culture))                                        



[facility] [id number] 14:47:00





             Test Item    Value        Reference Range Interpretation Comments

 

             UA Color (test code = UA Color) YELLOW                             

    



[facility] [id number] 14:47:00





             Test Item    Value        Reference Range Interpretation Comments

 

             UA Turbidity (test code = UA CLOUDY                                

 



             Turbidity)                                          



[facility] [id number] 14:47:00





             Test Item    Value        Reference Range Interpretation Comments

 

             UA Spec Grav (test code = UA Spec 1.017 1      1.001-1.035         

      



             Grav)                                               



[facility] [id number] 14:47:00





             Test Item    Value        Reference Range Interpretation Comments

 

             UA pH (test code = UA pH) 5.5 1        5.0-8.0                   



[facility] [id number] 14:47:00





             Test Item    Value        Reference Range Interpretation Comments

 

             UA Glucose (test code = UA Glucose) NEGATIVE                       

        



[facility] [id number] 14:47:00





             Test Item    Value        Reference Range Interpretation Comments

 

             UA Bili (test code = UA Bili) NEGATIVE                             

  



[facility] [id number] 14:47:00





             Test Item    Value        Reference Range Interpretation Comments

 

             UA Ketones (test code = UA Ketones) NEGATIVE                       

        



[facility] [id number] 14:47:00





             Test Item    Value        Reference Range Interpretation Comments

 

             UA Blood (test code = UA Blood) 1+                                 

    



[facility] [id number] 14:47:00





             Test Item    Value        Reference Range Interpretation Comments

 

             UA Protein (test code = UA Protein) 1+                             

        



[facility] [id number] 14:47:00





             Test Item    Value        Reference Range Interpretation Comments

 

             UA Nitrite (test code = UA Nitrite) POSITIVE                       

        



[facility] [id number] 14:47:00





             Test Item    Value        Reference Range Interpretation Comments

 

             UA Leuk Est (test code = UA Leuk Est) 2+                           

          



[facility] [id number] 14:47:00





             Test Item    Value        Reference Range Interpretation Comments

 

             UA WBC (test code = UA WBC) > OR = 60                              



[facility] [id number] 14:47:00





             Test Item    Value        Reference Range Interpretation Comments

 

             UA RBC (test code = UA RBC) 0-2                                    



[facility] [id number] 14:47:00





             Test Item    Value        Reference Range Interpretation Comments

 

             UA Sq Epi (test code = UA Sq Epi) NONE SEEN                        

      



[facility] [id number] 14:47:00





             Test Item    Value        Reference Range Interpretation Comments

 

             UA Bacteria (test code = UA Bacteria) MANY                         

          



[facility] [id number] 14:47:00





             Test Item    Value        Reference Range Interpretation Comments

 

             UA Hyal Cast (test code = UA Hyal NONE SEEN                        

      



             Cast)                                               



[facility] AND [id number] 14:47:00





             Test Item    Value        Reference Range Interpretation Comments

 

             UA Reflex (test code CULTURE INDICATED -                           



             = UA Reflex) RESULTS TO FOLLOW                           



[place] 571250-08-06 14:47:00





             Test Item    Value        Reference Range Interpretation Comments

 

             U Creat mg/dL (test code = U Creat 114                       

       



             mg/dL)                                              



[facility]2020-08-06 14:47:00





             Test Item    Value        Reference Range Interpretation Comments

 

             U Alb (test code = U Alb) 16.7                                   



[place] 571250-08-06 14:47:00





             Test Item    Value        Reference Range Interpretation Comments

 

             U Alb/Crea (test code = U Alb/Crea) 146                            

        



[place] 239490-08-06 14:47:00





             Test Item    Value        Reference Range Interpretation Comments

 

             Vitamin D, 25-OH, Total (test code = 37                      

         



             Vitamin D, 25-OH, Total)                                        



[facility]2020-08-06 14:47:00





             Test Item    Value        Reference Range Interpretation Comments

 

             U Creat mg/dL (test code = U Creat 114                       

       



             mg/dL)                                              



[place] 239490-08-06 14:47:00





             Test Item    Value        Reference Range Interpretation Comments

 

             U Prot/Creat (test code = U Prot/Creat) 430                  

            



[facility]2020-08-06 14:47:00





             Test Item    Value        Reference Range Interpretation Comments

 

             U Prot/Creat (test code = U 0.430 1      0.021-0.161               



             Prot/Creat)                                         



[facility]2020-08-06 14:47:00





             Test Item    Value        Reference Range Interpretation Comments

 

             U Protein (test code = U Protein) 49           5-24                

      



[place] 239490-08-06 14:47:00





             Test Item    Value        Reference Range Interpretation Comments

 

             Glucose Lvl (test code = Glucose Lvl) 103          65-99           

          



[place] 239490-08-06 14:47:00





             Test Item    Value        Reference Range Interpretation Comments

 

             BUN (test code = BUN) 24           7-25                      



[place] 239490-08-06 14:47:00





             Test Item    Value        Reference Range Interpretation Comments

 

             Creatinine Lvl (test code = Creatinine 1.32         0.50-0.99      

           



             Lvl)                                                



[place] 239490-08-06 14:47:00





             Test Item    Value        Reference Range Interpretation Comments

 

             eGFR NON-AFR. AMERICAN (test code = 43                             

        



             eGFR NON-AFR. AMERICAN)                                        



[place] 239490-08-06 14:47:00





             Test Item    Value        Reference Range Interpretation Comments

 

             eGFR  (test code = eGFR 50                         

            



             )                                        



[place] 239490-08-06 14:47:00





             Test Item    Value        Reference Range Interpretation Comments

 

             B/C Ratio (test code = B/C Ratio) 18           6-22                

      



[place] 239490-08-06 14:47:00





             Test Item    Value        Reference Range Interpretation Comments

 

             Sodium Lvl (test code = Sodium Lvl) 138          135-146           

        



[place] 239490-08-06 14:47:00





             Test Item    Value        Reference Range Interpretation Comments

 

             Potassium Lvl (test code = Potassium 4.4          3.5-5.3          

         



             Lvl)                                                



[facility]2020-08-06 14:47:00





             Test Item    Value        Reference Range Interpretation Comments

 

             Chloride Lvl (test code = Chloride Lvl) 102                  

            



[place] 239490-08-06 14:47:00





             Test Item    Value        Reference Range Interpretation Comments

 

             CO2 (test code = CO2) 30           20-32                     



[place] 239490-08-06 14:47:00





             Test Item    Value        Reference Range Interpretation Comments

 

             Calcium Lvl (test code = Calcium Lvl) 9.4          8.6-10.4        

          



[place] 239490-08-06 14:47:00





             Test Item    Value        Reference Range Interpretation Comments

 

             Phosphorus (test code = Phosphorus) 4.8          2.5-4.5           

        



[place] 239490-08-06 14:47:00





             Test Item    Value        Reference Range Interpretation Comments

 

             Albumin Lvl (test code = Albumin Lvl) 3.9          3.6-5.1         

          



[place] 981740-08-06 14:47:00





             Test Item    Value        Reference Range Interpretation Comments

 

             Plt Count Estimated (test code = DECREASED                         

     



             Plt Count Estimated)                                        



[facility]landDyoxxgmUHJFIVCBPA9105-90-82 14:47:00





             Test Item    Value        Reference Range Interpretation Comments

 

             WBC X 10x3 (test code = WBC X 10x3) 7.2          3.8-10.8          

        



[facility] PearlandLladdzlLCIWIBGUPX0007-46-71 14:47:00





             Test Item    Value        Reference Range Interpretation Comments

 

             RBC X 10x6 (test code = RBC X 10x6) 3.71         3.80-5.10         

        



[facility] PearlandIpuixryHTGXRXTQHE9830-71-93 14:47:00





             Test Item    Value        Reference Range Interpretation Comments

 

             Hgb (test code = Hgb) 10.7         11.7-15.5                 



[facility] PearlandXfntadpYXMBPSXFPE0758-17-07 14:47:00





             Test Item    Value        Reference Range Interpretation Comments

 

             Hct (test code = Hct) 33.9         35.0-45.0                 



[facility] PearlandNwvtccgUMXECLNPWM6948-97-89 14:47:00





             Test Item    Value        Reference Range Interpretation Comments

 

             MCV (test code = MCV) 91.4         80.0-100.0                



[facility] PearlandFsefigyLUAGPZSHKI1499-80-69 14:47:00





             Test Item    Value        Reference Range Interpretation Comments

 

             MCH (test code = MCH) 28.8 pg      27.0-33.0                 



[facility] PearlandSwhytikZKMMDYZJVN8812-20-21 14:47:00





             Test Item    Value        Reference Range Interpretation Comments

 

             MCHC (test code = MCHC) 31.6         32.0-36.0                 



[facility] PearlandMpiaxqqVWMVTWLRFD3860-37-30 14:47:00





             Test Item    Value        Reference Range Interpretation Comments

 

             RDW (test code = RDW) 13.9         11.0-15.0                 



[facility] PearlandVfcvbmiFVQXILZUTE2119-14-73 14:47:00





             Test Item    Value        Reference Range Interpretation Comments

 

             Platelet (test code = Platelet) 110          140-400               

    



[facility] PearlandHnbltlrQUITBBDCVW1661-12-23 14:47:00





             Test Item    Value        Reference Range Interpretation Comments

 

             MPV (test code = MPV) 11.7         7.5-12.5                  



[facility] PearlandRzgdkslJMEEALVINI8796-46-23 14:47:00





             Test Item    Value        Reference Range Interpretation Comments

 

             Neutrophils # (test code = Neutrophils 5414         4075-7941      

           



             #)                                                  



[facility] PearlandRvliefvGDKMSSQBTJ7598-25-18 14:47:00





             Test Item    Value        Reference Range Interpretation Comments

 

             Lymphocytes # (test code = Lymphocytes 0162         850-3900       

           



             #)                                                  



[facility] PearlandPwpeytfCSFJWOHNII2427-52-16 14:47:00





             Test Item    Value        Reference Range Interpretation Comments

 

             Monocytes # (test code = Monocytes #) 461          200-950         

          



[facility] PearlandRijnkavNPHBFPOIAG9215-68-32 14:47:00





             Test Item    Value        Reference Range Interpretation Comments

 

             Eosinophils # (test code = Eosinophils 122                   

           



             #)                                                  



[facility] CenterZzbujeaZWNGVCPHEE3169-04-62 14:47:00





             Test Item    Value        Reference Range Interpretation Comments

 

             Basophils # (test code 50           See_Comment                [Aut

omated message] The



             = Basophils #)                                        system which 

generated



                                                                 this result tra

nsmitted



                                                                 reference range

: <=200.



                                                                 The reference r

cheyenne was



                                                                 not used to int

erpret



                                                                 this result as



                                                                 normal/abnormal

.



[facility] AustinNedowoxDIOQSPZMAX6066-00-38 14:47:00





             Test Item    Value        Reference Range Interpretation Comments

 

             Segs (test code = Segs) 75.2                                   



[facility] HospitalExjivffNAKXBFTGKV3196-25-74 14:47:00





             Test Item    Value        Reference Range Interpretation Comments

 

             Lymphocytes (test code = Lymphocytes) 16.0                         

          



[facility] HospitalJrjzgshHQJCOMZTGW0188-85-70 14:47:00





             Test Item    Value        Reference Range Interpretation Comments

 

             Monocytes (test code = Monocytes) 6.4                              

      



[facility] HospitalQwfwcjiKQBPTGWFLD8407-76-19 14:47:00





             Test Item    Value        Reference Range Interpretation Comments

 

             Eosinophils (test code = Eosinophils) 1.7                          

          



[facility] CenterSjddkluPMNSSAJYKU3331-69-14 14:47:00





             Test Item    Value        Reference Range Interpretation Comments

 

             Basophils (test code = Basophils) 0.7                              

      



[facility] AustinREFERENCE LAB [id number] 14:47:00





             Test Item    Value        Reference Range Interpretation Comments

 

             Result 2 (Urine Culture) See Result Comment                        

   



             (test code = Result 2                                        



             (Urine Culture))                                        



[facility] AND [id number] 14:47:00





             Test Item    Value        Reference Range Interpretation Comments

 

             UA Color (test code = UA Color) YELLOW                             

    



[facility] AND [id number] 14:47:00





             Test Item    Value        Reference Range Interpretation Comments

 

             UA Turbidity (test code = UA CLOUDY                                

 



             Turbidity)                                          



[facility] AND [id number] 14:47:00





             Test Item    Value        Reference Range Interpretation Comments

 

             UA Spec Grav (test code = UA Spec 1.017 1      1.001-1.035         

      



             Grav)                                               



[facility] AND [id number] 14:47:00





             Test Item    Value        Reference Range Interpretation Comments

 

             UA pH (test code = UA pH) 5.5 1        5.0-8.0                   



[facility] AND [id number] 14:47:00





             Test Item    Value        Reference Range Interpretation Comments

 

             UA Glucose (test code = UA Glucose) NEGATIVE                       

        



[facility] [id number] 14:47:00





             Test Item    Value        Reference Range Interpretation Comments

 

             UA Bili (test code = UA Bili) NEGATIVE                             

  



[facility] [id number] 14:47:00





             Test Item    Value        Reference Range Interpretation Comments

 

             UA Ketones (test code = UA Ketones) NEGATIVE                       

        



[facility] [id number] 14:47:00





             Test Item    Value        Reference Range Interpretation Comments

 

             UA Blood (test code = UA Blood) 1+                                 

    



[facility] [id number] 14:47:00





             Test Item    Value        Reference Range Interpretation Comments

 

             UA Protein (test code = UA Protein) 1+                             

        



[facility] [id number] 14:47:00





             Test Item    Value        Reference Range Interpretation Comments

 

             UA Nitrite (test code = UA Nitrite) POSITIVE                       

        



[facility] [id number] 14:47:00





             Test Item    Value        Reference Range Interpretation Comments

 

             UA Leuk Est (test code = UA Leuk Est) 2+                           

          



[facility] [id number] 14:47:00





             Test Item    Value        Reference Range Interpretation Comments

 

             UA WBC (test code = UA WBC) > OR = 60                              



[facility] [id number] 14:47:00





             Test Item    Value        Reference Range Interpretation Comments

 

             UA RBC (test code = UA RBC) 0-2                                    



[facility] [id number] 14:47:00





             Test Item    Value        Reference Range Interpretation Comments

 

             UA Sq Epi (test code = UA Sq Epi) NONE SEEN                        

      



[facility] [id number] 14:47:00





             Test Item    Value        Reference Range Interpretation Comments

 

             UA Bacteria (test code = UA Bacteria) MANY                         

          



[facility] [id number] 14:47:00





             Test Item    Value        Reference Range Interpretation Comments

 

             UA Hyal Cast (test code = UA Hyal NONE SEEN                        

      



             Cast)                                               



[facility] [id number] 14:47:00





             Test Item    Value        Reference Range Interpretation Comments

 

             UA Reflex (test code CULTURE INDICATED -                           



             = UA Reflex) RESULTS TO FOLLOW                           



[facility] [id number] 14:47:00





             Test Item    Value        Reference Range Interpretation Comments

 

             U Creat mg/dL (test code = U Creat 114                       

       



             mg/dL)                                              



[facility] [id number] 14:47:00





             Test Item    Value        Reference Range Interpretation Comments

 

             U Alb (test code = U Alb) 16.7                                   



[facility] [id number] 14:47:00





             Test Item    Value        Reference Range Interpretation Comments

 

             U Alb/Crea (test code = U Alb/Crea) 146                            

        



[facility] [id number] 14:47:00





             Test Item    Value        Reference Range Interpretation Comments

 

             Vitamin D, 25-OH, Total (test code = 37                      

         



             Vitamin D, 25-OH, Total)                                        



[facility]2020-08-06 14:47:00





             Test Item    Value        Reference Range Interpretation Comments

 

             U Creat mg/dL (test code = U Creat 114                       

       



             mg/dL)                                              



[facility]2020-08-06 14:47:00





             Test Item    Value        Reference Range Interpretation Comments

 

             U Prot/Creat (test code = U Prot/Creat) 430                  

            



[place] 239490-08-06 14:47:00





             Test Item    Value        Reference Range Interpretation Comments

 

             U Prot/Creat (test code = U 0.430 1      0.021-0.161               



             Prot/Creat)                                         



[facility]2020-08-06 14:47:00





             Test Item    Value        Reference Range Interpretation Comments

 

             U Protein (test code = U Protein) 49           5-24                

      



[facility]2020-08-06 14:47:00





             Test Item    Value        Reference Range Interpretation Comments

 

             Glucose Lvl (test code = Glucose Lvl) 103          65-99           

          



[facility]2020-08-06 14:47:00





             Test Item    Value        Reference Range Interpretation Comments

 

             BUN (test code = BUN) 24           7-25                      



[facility]2020-08-06 14:47:00





             Test Item    Value        Reference Range Interpretation Comments

 

             Creatinine Lvl (test code = Creatinine 1.32         0.50-0.99      

           



             Lvl)                                                



[facility]2020-08-06 14:47:00





             Test Item    Value        Reference Range Interpretation Comments

 

             eGFR NON-AFR. AMERICAN (test code = 43                             

        



             eGFR NON-AFR. AMERICAN)                                        



[facility]2020-08-06 14:47:00





             Test Item    Value        Reference Range Interpretation Comments

 

             eGFR  (test code = eGFR 50                         

            



             )                                        



[place] 239490-08-06 14:47:00





             Test Item    Value        Reference Range Interpretation Comments

 

             B/C Ratio (test code = B/C Ratio) 18           6-22                

      



[facility]2020-08-06 14:47:00





             Test Item    Value        Reference Range Interpretation Comments

 

             Sodium Lvl (test code = Sodium Lvl) 138          135-146           

        



[place] 239490-08-06 14:47:00





             Test Item    Value        Reference Range Interpretation Comments

 

             Potassium Lvl (test code = Potassium 4.4          3.5-5.3          

         



             Lvl)                                                



[place] 239490-08-06 14:47:00





             Test Item    Value        Reference Range Interpretation Comments

 

             Chloride Lvl (test code = Chloride Lvl) 102                  

            



[place] 239490-08-06 14:47:00





             Test Item    Value        Reference Range Interpretation Comments

 

             CO2 (test code = CO2) 30           20-32                     



[place] 239490-08-06 14:47:00





             Test Item    Value        Reference Range Interpretation Comments

 

             Calcium Lvl (test code = Calcium Lvl) 9.4          8.6-10.4        

          



[place] 239490-08-06 14:47:00





             Test Item    Value        Reference Range Interpretation Comments

 

             Phosphorus (test code = Phosphorus) 4.8          2.5-4.5           

        



[place] 239490-08-06 14:47:00





             Test Item    Value        Reference Range Interpretation Comments

 

             Albumin Lvl (test code = Albumin Lvl) 3.9          3.6-5.1         

          



[place] 981740-08-06 14:47:00





             Test Item    Value        Reference Range Interpretation Comments

 

             Plt Count Estimated (test code = DECREASED                         

     



             Plt Count Estimated)                                        



[place] [address]-08-06 14:47:00





             Test Item    Value        Reference Range Interpretation Comments

 

             WBC X 10x3 (test code = WBC X 10x3) 7.2          3.8-10.8          

        



[place] [address]-08-06 14:47:00





             Test Item    Value        Reference Range Interpretation Comments

 

             RBC X 10x6 (test code = RBC X 10x6) 3.71         3.80-5.10         

        



[place] [address]-08-06 14:47:00





             Test Item    Value        Reference Range Interpretation Comments

 

             Hgb (test code = Hgb) 10.7         11.7-15.5                 



[place] [address]-08-06 14:47:00





             Test Item    Value        Reference Range Interpretation Comments

 

             Hct (test code = Hct) 33.9         35.0-45.0                 



[place] [address]-08-06 14:47:00





             Test Item    Value        Reference Range Interpretation Comments

 

             MCV (test code = MCV) 91.4         80.0-100.0                



[id number]





             Test Item    Value        Reference Range Interpretation Comments

 

             MCH (test code = MCH) 28.8 pg      27.0-33.0                 



[id number] 14:47:00





             Test Item    Value        Reference Range Interpretation Comments

 

             MCHC (test code = MCHC) 31.6         32.0-36.0                 



[id number] 14:47:00





             Test Item    Value        Reference Range Interpretation Comments

 

             RDW (test code = RDW) 13.9         11.0-15.0                 



[id number] 14:47:00





             Test Item    Value        Reference Range Interpretation Comments

 

             Platelet (test code = Platelet) 110          140-400               

    



[id number] 14:47:00





             Test Item    Value        Reference Range Interpretation Comments

 

             MPV (test code = MPV) 11.7         7.5-12.5                  



[id number] 14:47:00





             Test Item    Value        Reference Range Interpretation Comments

 

             Neutrophils # (test code = Neutrophils 5414         5178-4963      

           



             #)                                                  



[facility]landJdzrifvWXIKTMCQXN9965-94-27 14:47:00





             Test Item    Value        Reference Range Interpretation Comments

 

             Lymphocytes # (test code = Lymphocytes 1152         850-3900       

           



             #)                                                  



[id number] 14:47:00





             Test Item    Value        Reference Range Interpretation Comments

 

             Monocytes # (test code = Monocytes #) 461          200-950         

          



[facility]landPsfjaquUBPGCQEUAS4164-25-45 14:47:00





             Test Item    Value        Reference Range Interpretation Comments

 

             Eosinophils # (test code = Eosinophils 122                   

           



             #)                                                  



[facility]landFbbjdzpLQLJSXRFAJ7672-67-27 14:47:00





             Test Item    Value        Reference Range Interpretation Comments

 

             Basophils # (test code 50           See_Comment                [Aut

omated message] The



             = Basophils #)                                        system which 

generated



                                                                 this result tra

nsmitted



                                                                 reference range

: <=200.



                                                                 The reference r

cheyenne was



                                                                 not used to int

erpret



                                                                 this result as



                                                                 normal/abnormal

.



[facility]landYnvgnekYTNEJMTKYV1169-06-70 14:47:00





             Test Item    Value        Reference Range Interpretation Comments

 

             Segs (test code = Segs) 75.2                                   



[facility]landZmdbdbcBNZBAPPYXP6303-13-02 14:47:00





             Test Item    Value        Reference Range Interpretation Comments

 

             Lymphocytes (test code = Lymphocytes) 16.0                         

          



[facility]73[facility] 14:47:00





             Test Item    Value        Reference Range Interpretation Comments

 

             Monocytes (test code = Monocytes) 6.4                              

      



[facility] Ho[id number] 14:47:00





             Test Item    Value        Reference Range Interpretation Comments

 

             Eosinophils (test code = Eosinophils) 1.7                          

          



[facility] Hospital[facility]ttzyprOCLYGCDLIS9173-61-50 14:47:00





             Test Item    Value        Reference Range Interpretation Comments

 

             Basophils (test code = Basophils) 0.7                              

      



[facility] [id number] 14:47:00





             Test Item    Value        Reference Range Interpretation Comments

 

             Result 2 (Urine Culture) See Result Comment                        

   



             (test code = Result 2                                        



             (Urine Culture))                                        



[facility] AND [id number] 14:47:00





             Test Item    Value        Reference Range Interpretation Comments

 

             UA Color (test code = UA Color) YELLOW                             

    



[facility] AND [id number] 14:47:00





             Test Item    Value        Reference Range Interpretation Comments

 

             UA Turbidity (test code = UA CLOUDY                                

 



             Turbidity)                                          



[facility] AND [id number] 14:47:00





             Test Item    Value        Reference Range Interpretation Comments

 

             UA Spec Grav (test code = UA Spec 1.017 1      1.001-1.035         

      



             Grav)                                               



[facility] AND [id number] 14:47:00





             Test Item    Value        Reference Range Interpretation Comments

 

             UA pH (test code = UA pH) 5.5 1        5.0-8.0                   



[facility] AND [id number] 14:47:00





             Test Item    Value        Reference Range Interpretation Comments

 

             UA Glucose (test code = UA Glucose) NEGATIVE                       

        



[facility] AND [id number] 14:47:00





             Test Item    Value        Reference Range Interpretation Comments

 

             UA Bili (test code = UA Bili) NEGATIVE                             

  



[facility] AND [id number] 14:47:00





             Test Item    Value        Reference Range Interpretation Comments

 

             UA Ketones (test code = UA Ketones) NEGATIVE                       

        



[facility] AND [id number] 14:47:00





             Test Item    Value        Reference Range Interpretation Comments

 

             UA Blood (test code = UA Blood) 1+                                 

    



[facility] AND [id number] 14:47:00





             Test Item    Value        Reference Range Interpretation Comments

 

             UA Protein (test code = UA Protein) 1+                             

        



[facility] [id number] 14:47:00





             Test Item    Value        Reference Range Interpretation Comments

 

             UA Nitrite (test code = UA Nitrite) POSITIVE                       

        



[facility] AND [id number] 14:47:00





             Test Item    Value        Reference Range Interpretation Comments

 

             UA Leuk Est (test code = UA Leuk Est) 2+                           

          



[facility] [id number] 14:47:00





             Test Item    Value        Reference Range Interpretation Comments

 

             UA WBC (test code = UA WBC) > OR = 60                              



[facility] AND [id number] 14:47:00





             Test Item    Value        Reference Range Interpretation Comments

 

             UA RBC (test code = UA RBC) 0-2                                    



[facility] AND [id number] 14:47:00





             Test Item    Value        Reference Range Interpretation Comments

 

             UA Sq Epi (test code = UA Sq Epi) NONE SEEN                        

      



[facility] AND [id number] 14:47:00





             Test Item    Value        Reference Range Interpretation Comments

 

             UA Bacteria (test code = UA Bacteria) MANY                         

          



[facility] [id number] 14:47:00





             Test Item    Value        Reference Range Interpretation Comments

 

             UA Hyal Cast (test code = UA Hyal NONE SEEN                        

      



             Cast)                                               



[facility] AND [id number] 14:47:00





             Test Item    Value        Reference Range Interpretation Comments

 

             UA Reflex (test code CULTURE INDICATED -                           



             = UA Reflex) RESULTS TO FOLLOW                           



[facility]2020-08-06 14:47:00





             Test Item    Value        Reference Range Interpretation Comments

 

             U Creat mg/dL (test code = U Creat 114                       

       



             mg/dL)                                              



[facility] [id number] 14:47:00





             Test Item    Value        Reference Range Interpretation Comments

 

             U Alb (test code = U Alb) 16.7                                   



[facility] [id number] 14:47:00





             Test Item    Value        Reference Range Interpretation Comments

 

             U Alb/Crea (test code = U Alb/Crea) 146                            

        



[facility] [id number] 14:47:00





             Test Item    Value        Reference Range Interpretation Comments

 

             Vitamin D, 25-OH, Total (test code = 37                      

         



             Vitamin D, 25-OH, Total)                                        



[facility] [id number] 14:47:00





             Test Item    Value        Reference Range Interpretation Comments

 

             U Creat mg/dL (test code = U Creat 114                       

       



             mg/dL)                                              



[facility]2020-08-06 14:47:00





             Test Item    Value        Reference Range Interpretation Comments

 

             U Prot/Creat (test code = U Prot/Creat) 430                  

            



[place] 239490-08-06 14:47:00





             Test Item    Value        Reference Range Interpretation Comments

 

             U Prot/Creat (test code = U 0.430 1      0.021-0.161               



             Prot/Creat)                                         



[place] 239490-08-06 14:47:00





             Test Item    Value        Reference Range Interpretation Comments

 

             U Protein (test code = U Protein) 49           5-24                

      



[place] 239490-08-06 14:47:00





             Test Item    Value        Reference Range Interpretation Comments

 

             Glucose Lvl (test code = Glucose Lvl) 103          65-99           

          



[place] 239490-08-06 14:47:00





             Test Item    Value        Reference Range Interpretation Comments

 

             BUN (test code = BUN) 24           7-25                      



[place] 239490-08-06 14:47:00





             Test Item    Value        Reference Range Interpretation Comments

 

             Creatinine Lvl (test code = Creatinine 1.32         0.50-0.99      

           



             Lvl)                                                



[facility]2020-08-06 14:47:00





             Test Item    Value        Reference Range Interpretation Comments

 

             eGFR NON-AFR. AMERICAN (test code = 43                             

        



             eGFR NON-AFR. AMERICAN)                                        



[facility]2020-08-06 14:47:00





             Test Item    Value        Reference Range Interpretation Comments

 

             eGFR  (test code = eGFR 50                         

            



             )                                        



[place] 239490-08-06 14:47:00





             Test Item    Value        Reference Range Interpretation Comments

 

             B/C Ratio (test code = B/C Ratio) 18           6-22                

      



[place] 239490-08-06 14:47:00





             Test Item    Value        Reference Range Interpretation Comments

 

             Sodium Lvl (test code = Sodium Lvl) 138          135-146           

        



[place] 239490-08-06 14:47:00





             Test Item    Value        Reference Range Interpretation Comments

 

             Potassium Lvl (test code = Potassium 4.4          3.5-5.3          

         



             Lvl)                                                



[place] 239490-08-06 14:47:00





             Test Item    Value        Reference Range Interpretation Comments

 

             Chloride Lvl (test code = Chloride Lvl) 102                  

            



[place] 239490-08-06 14:47:00





             Test Item    Value        Reference Range Interpretation Comments

 

             CO2 (test code = CO2) 30           20-32                     



[place] 239490-08-06 14:47:00





             Test Item    Value        Reference Range Interpretation Comments

 

             Calcium Lvl (test code = Calcium Lvl) 9.4          8.6-10.4        

          



[place] 26701-08-06 14:47:00





             Test Item    Value        Reference Range Interpretation Comments

 

             Phosphorus (test code = Phosphorus) 4.8          2.5-4.5           

        



[place] 239490-08-06 14:47:00





             Test Item    Value        Reference Range Interpretation Comments

 

             Albumin Lvl (test code = Albumin Lvl) 3.9          3.6-5.1         

          



[place] [address]62-08-06 14:47:00





             Test Item    Value        Reference Range Interpretation Comments

 

             Plt Count Estimated (test code = DECREASED                         

     



             Plt Count Estimated)                                        



[place] 981740-08-06 14:47:00





             Test Item    Value        Reference Range Interpretation Comments

 

             WBC X 10x3 (test code = WBC X 10x3) 7.2          3.8-10.8          

        



[place] [address]-08-06 14:47:00





             Test Item    Value        Reference Range Interpretation Comments

 

             RBC X 10x6 (test code = RBC X 10x6) 3.71         3.80-5.10         

        



[place] [address]62-08-06 14:47:00





             Test Item    Value        Reference Range Interpretation Comments

 

             Hgb (test code = Hgb) 10.7         11.7-15.5                 



[place] 981740-08-06 14:47:00





             Test Item    Value        Reference Range Interpretation Comments

 

             Hct (test code = Hct) 33.9         35.0-45.0                 



[place] [address]-08-06 14:47:00





             Test Item    Value        Reference Range Interpretation Comments

 

             MCV (test code = MCV) 91.4         80.0-100.0                



[place] [address]-08-06 14:47:00





             Test Item    Value        Reference Range Interpretation Comments

 

             MCH (test code = MCH) 28.8 pg      27.0-33.0                 



[place] [address]-08-06 14:47:00





             Test Item    Value        Reference Range Interpretation Comments

 

             MCHC (test code = MCHC) 31.6         32.0-36.0                 



[place] [address]-08-06 14:47:00





             Test Item    Value        Reference Range Interpretation Comments

 

             RDW (test code = RDW) 13.9         11.0-15.0                 



[facility]14[facility] 14:47:00





             Test Item    Value        Reference Range Interpretation Comments

 

             Platelet (test code = Platelet) 110          140-400               

    



[facility]landBwjtfsaUYHBARADWH5451-53-11 14:47:00





             Test Item    Value        Reference Range Interpretation Comments

 

             MPV (test code = MPV) 11.7         7.5-12.5                  



[facility]landYiejnvqHKPWERMLKS5285-33-58 14:47:00





             Test Item    Value        Reference Range Interpretation Comments

 

             Neutrophils # (test code = Neutrophils 5414         5558-8637      

           



             #)                                                  



[facility]landHwuaqiqXPGFHIFHHC0120-73-31 14:47:00





             Test Item    Value        Reference Range Interpretation Comments

 

             Lymphocytes # (test code = Lymphocytes 1152         850-3900       

           



             #)                                                  



[facility]landHsawkyoBDHEUXJWTT8497-90-50 14:47:00





             Test Item    Value        Reference Range Interpretation Comments

 

             Monocytes # (test code = Monocytes #) 461          200-950         

          



[facility]landNlfrivyDNFKKZKRAP0926-32-09 14:47:00





             Test Item    Value        Reference Range Interpretation Comments

 

             Eosinophils # (test code = Eosinophils 122                   

           



             #)                                                  



[facility]landBzvhzgfDYKBYCJNRR1967-22-11 14:47:00





             Test Item    Value        Reference Range Interpretation Comments

 

             Basophils # (test code 50           See_Comment                [Aut

omated message] The



             = Basophils #)                                        system which 

generated



                                                                 this result tra

nsmitted



                                                                 reference range

: <=200.



                                                                 The reference r

cheyenne was



                                                                 not used to int

erpret



                                                                 this result as



                                                                 normal/abnormal

.



[facility]landBvjxdgtTMSANZNVNN9469-30-32 14:47:00





             Test Item    Value        Reference Range Interpretation Comments

 

             Segs (test code = Segs) 75.2                                   



[facility]landXbwbopzMQKWTWILOH9727-53-50 14:47:00





             Test Item    Value        Reference Range Interpretation Comments

 

             Lymphocytes (test code = Lymphocytes) 16.0                         

          



[place] 981740-08-06 14:47:00





             Test Item    Value        Reference Range Interpretation Comments

 

             Monocytes (test code = Monocytes) 6.4                              

      



[facility]landNcteudfTGXPXLFRQW4582-16-16 14:47:00





             Test Item    Value        Reference Range Interpretation Comments

 

             Eosinophils (test code = Eosinophils) 1.7                          

          



[facility]landWxbjzmrSGNHQZTLUA1752-06-06 14:47:00





             Test Item    Value        Reference Range Interpretation Comments

 

             Basophils (test code = Basophils) 0.7                              

      



[id number]





             Test Item    Value        Reference Range Interpretation Comments

 

             Result 2 (Urine Culture) See Result Comment                        

   



             (test code = Result 2                                        



             (Urine Culture))                                        



[facility] [id number] 14:47:00





             Test Item    Value        Reference Range Interpretation Comments

 

             UA Color (test code = UA Color) YELLOW                             

    



[facility] [id number] 14:47:00





             Test Item    Value        Reference Range Interpretation Comments

 

             UA Turbidity (test code = UA CLOUDY                                

 



             Turbidity)                                          



[facility] [id number] 14:47:00





             Test Item    Value        Reference Range Interpretation Comments

 

             UA Spec Grav (test code = UA Spec 1.017 1      1.001-1.035         

      



             Grav)                                               



[facility] [id number] 14:47:00





             Test Item    Value        Reference Range Interpretation Comments

 

             UA pH (test code = UA pH) 5.5 1        5.0-8.0                   



[facility] [id number] 14:47:00





             Test Item    Value        Reference Range Interpretation Comments

 

             UA Glucose (test code = UA Glucose) NEGATIVE                       

        



[facility] [id number] 14:47:00





             Test Item    Value        Reference Range Interpretation Comments

 

             UA Bili (test code = UA Bili) NEGATIVE                             

  



[facility] [id number] 14:47:00





             Test Item    Value        Reference Range Interpretation Comments

 

             UA Ketones (test code = UA Ketones) NEGATIVE                       

        



[facility] [id number] 14:47:00





             Test Item    Value        Reference Range Interpretation Comments

 

             UA Blood (test code = UA Blood) 1+                                 

    



[facility] [id number] 14:47:00





             Test Item    Value        Reference Range Interpretation Comments

 

             UA Protein (test code = UA Protein) 1+                             

        



[facility] [id number] 14:47:00





             Test Item    Value        Reference Range Interpretation Comments

 

             UA Nitrite (test code = UA Nitrite) POSITIVE                       

        



[facility] [id number] 14:47:00





             Test Item    Value        Reference Range Interpretation Comments

 

             UA Leuk Est (test code = UA Leuk Est) 2+                           

          



[facility] [id number] 14:47:00





             Test Item    Value        Reference Range Interpretation Comments

 

             UA WBC (test code = UA WBC) > OR = 60                              



[facility] [id number] 14:47:00





             Test Item    Value        Reference Range Interpretation Comments

 

             UA RBC (test code = UA RBC) 0-2                                    



[facility] AND [id number] 14:47:00





             Test Item    Value        Reference Range Interpretation Comments

 

             UA Sq Epi (test code = UA Sq Epi) NONE SEEN                        

      



[facility] AND [id number] 14:47:00





             Test Item    Value        Reference Range Interpretation Comments

 

             UA Bacteria (test code = UA Bacteria) MANY                         

          



[facility] AND [id number] 14:47:00





             Test Item    Value        Reference Range Interpretation Comments

 

             UA Hyal Cast (test code = UA Hyal NONE SEEN                        

      



             Cast)                                               



[facility] AND [id number] 14:47:00





             Test Item    Value        Reference Range Interpretation Comments

 

             UA Reflex (test code CULTURE INDICATED -                           



             = UA Reflex) RESULTS TO FOLLOW                           



[facility]2020-08-06 14:47:00





             Test Item    Value        Reference Range Interpretation Comments

 

             U Creat mg/dL (test code = U Creat 114                       

       



             mg/dL)                                              



[facility]2020-08-06 14:47:00





             Test Item    Value        Reference Range Interpretation Comments

 

             U Alb (test code = U Alb) 16.7                                   



[facility]2020-08-06 14:47:00





             Test Item    Value        Reference Range Interpretation Comments

 

             U Alb/Crea (test code = U Alb/Crea) 146                            

        



[facility]2020-08-06 14:47:00





             Test Item    Value        Reference Range Interpretation Comments

 

             Vitamin D, 25-OH, Total (test code = 37                      

         



             Vitamin D, 25-OH, Total)                                        



[place] 239490-08-06 14:47:00





             Test Item    Value        Reference Range Interpretation Comments

 

             U Creat mg/dL (test code = U Creat 114                       

       



             mg/dL)                                              



[facility]2020-08-06 14:47:00





             Test Item    Value        Reference Range Interpretation Comments

 

             U Prot/Creat (test code = U Prot/Creat) 430                  

            



[place] 239490-08-06 14:47:00





             Test Item    Value        Reference Range Interpretation Comments

 

             U Prot/Creat (test code = U 0.430 1      0.021-0.161               



             Prot/Creat)                                         



[facility]2020-08-06 14:47:00





             Test Item    Value        Reference Range Interpretation Comments

 

             U Protein (test code = U Protein) 49           5-24                

      



[facility]2020-08-06 14:47:00





             Test Item    Value        Reference Range Interpretation Comments

 

             Glucose Lvl (test code = Glucose Lvl) 103          65-99           

          



[place] 239490-08-06 14:47:00





             Test Item    Value        Reference Range Interpretation Comments

 

             BUN (test code = BUN) 24           7-25                      



[place] 239490-08-06 14:47:00





             Test Item    Value        Reference Range Interpretation Comments

 

             Creatinine Lvl (test code = Creatinine 1.32         0.50-0.99      

           



             Lvl)                                                



[place] 239490-08-06 14:47:00





             Test Item    Value        Reference Range Interpretation Comments

 

             eGFR NON-AFR. AMERICAN (test code = 43                             

        



             eGFR NON-AFR. AMERICAN)                                        



[place] 239490-08-06 14:47:00





             Test Item    Value        Reference Range Interpretation Comments

 

             eGFR  (test code = eGFR 50                         

            



             )                                        



[place] 239490-08-06 14:47:00





             Test Item    Value        Reference Range Interpretation Comments

 

             B/C Ratio (test code = B/C Ratio) 18           6-22                

      



[place] 239490-08-06 14:47:00





             Test Item    Value        Reference Range Interpretation Comments

 

             Sodium Lvl (test code = Sodium Lvl) 138          135-146           

        



[facility]2020-08-06 14:47:00





             Test Item    Value        Reference Range Interpretation Comments

 

             Potassium Lvl (test code = Potassium 4.4          3.5-5.3          

         



             Lvl)                                                



[facility]2020-08-06 14:47:00





             Test Item    Value        Reference Range Interpretation Comments

 

             Chloride Lvl (test code = Chloride Lvl) 102                  

            



[place] 239490-08-06 14:47:00





             Test Item    Value        Reference Range Interpretation Comments

 

             CO2 (test code = CO2) 30           20-32                     



[place] 239490-08-06 14:47:00





             Test Item    Value        Reference Range Interpretation Comments

 

             Calcium Lvl (test code = Calcium Lvl) 9.4          8.6-10.4        

          



[place] 239490-08-06 14:47:00





             Test Item    Value        Reference Range Interpretation Comments

 

             Phosphorus (test code = Phosphorus) 4.8          2.5-4.5           

        



[place] 239490-08-06 14:47:00





             Test Item    Value        Reference Range Interpretation Comments

 

             Albumin Lvl (test code = Albumin Lvl) 3.9          3.6-5.1         

          



[facility]UhcamdqAHMYVCPXKH1138-09-23 14:47:00





             Test Item    Value        Reference Range Interpretation Comments

 

             Plt Count Estimated (test code = DECREASED                         

     



             Plt Count Estimated)                                        



[facility]landKiyetkrOZKPIPXWRA4762-35-89 14:47:00





             Test Item    Value        Reference Range Interpretation Comments

 

             WBC X 10x3 (test code = WBC X 10x3) 7.2          3.8-10.8          

        



[place] 981740-08-06 14:47:00





             Test Item    Value        Reference Range Interpretation Comments

 

             RBC X 10x6 (test code = RBC X 10x6) 3.71         3.80-5.10         

        



[facility]landUlojgurWKUQNDGWZA1745-87-14 14:47:00





             Test Item    Value        Reference Range Interpretation Comments

 

             Hgb (test code = Hgb) 10.7         11.7-15.5                 



[facility]landTduwjveDJXQUGAULP5636-30-85 14:47:00





             Test Item    Value        Reference Range Interpretation Comments

 

             Hct (test code = Hct) 33.9         35.0-45.0                 



[facility]landTnmrgdjQWCVWGPNPZ5291-59-64 14:47:00





             Test Item    Value        Reference Range Interpretation Comments

 

             MCV (test code = MCV) 91.4         80.0-100.0                



[facility]landUftmquhPFHWBYPZZY5608-65-81 14:47:00





             Test Item    Value        Reference Range Interpretation Comments

 

             MCH (test code = MCH) 28.8 pg      27.0-33.0                 



[facility]landHbedfkpUUWFZYFKGG2476-22-30 14:47:00





             Test Item    Value        Reference Range Interpretation Comments

 

             MCHC (test code = MCHC) 31.6         32.0-36.0                 



[facility]landQgyfefnZCLDQACTCC3142-85-40 14:47:00





             Test Item    Value        Reference Range Interpretation Comments

 

             RDW (test code = RDW) 13.9         11.0-15.0                 



[place] 981740-08-06 14:47:00





             Test Item    Value        Reference Range Interpretation Comments

 

             Platelet (test code = Platelet) 110          140-400               

    



[facility]landAldwolqNNTMQXEXGE0197-30-71 14:47:00





             Test Item    Value        Reference Range Interpretation Comments

 

             MPV (test code = MPV) 11.7         7.5-12.5                  



[facility]landAhqziakIWSLPYVPVN1554-81-79 14:47:00





             Test Item    Value        Reference Range Interpretation Comments

 

             Neutrophils # (test code = Neutrophils 5414         3099-6108      

           



             #)                                                  



[facility]IabydyxZFYNVJOCFS4404-72-42 14:47:00





             Test Item    Value        Reference Range Interpretation Comments

 

             Lymphocytes # (test code = Lymphocytes 1152         850-3900       

           



             #)                                                  



[facility]IB[facility]HGW3700[facility]60-68 14:47:00





             Test Item    Value        Reference Range Interpretation Comments

 

             Monocytes # (test code = Monocytes #) 461          200-950         

          



[facility] AustinJuaxkhoOGBEBJVOBI6281-70-92 14:47:00





             Test Item    Value        Reference Range Interpretation Comments

 

             Eosinophils # (test code = Eosinophils 122                   

           



             #)                                                  



[facility] HospitalIeouzlkHLPJDLEXQW5105-41-73 14:47:00





             Test Item    Value        Reference Range Interpretation Comments

 

             Basophils # (test code 50           See_Comment                [Aut

omated message] The



             = Basophils #)                                        system which 

generated



                                                                 this result tra

nsmitted



                                                                 reference range

: <=200.



                                                                 The reference r

cheyenne was



                                                                 not used to int

erpret



                                                                 this result as



                                                                 normal/abnormal

.



[facility] HospitalSmjecphCRXHQDHKOE2595-96-84 14:47:00





             Test Item    Value        Reference Range Interpretation Comments

 

             Segs (test code = Segs) 75.2                                   



[facility] HospitalNxmgbjaZKJGRYXNVM0906-75-95 14:47:00





             Test Item    Value        Reference Range Interpretation Comments

 

             Lymphocytes (test code = Lymphocytes) 16.0                         

          



[facility] AustinGfndiygTJNPPEZCLK8419-71-67 14:47:00





             Test Item    Value        Reference Range Interpretation Comments

 

             Monocytes (test code = Monocytes) 6.4                              

      



[facility] HospitalEhzymwxURDPBLIZYX4291-96-14 14:47:00





             Test Item    Value        Reference Range Interpretation Comments

 

             Eosinophils (test code = Eosinophils) 1.7                          

          



[facility] AustinAdmmivmDDWXIKUPIR9702-89-12 14:47:00





             Test Item    Value        Reference Range Interpretation Comments

 

             Basophils (test code = Basophils) 0.7                              

      



[facility] [id number] 14:47:00





             Test Item    Value        Reference Range Interpretation Comments

 

             Result 2 (Urine Culture) See Result Comment                        

   



             (test code = Result 2                                        



             (Urine Culture))                                        



[facility] AND [id number] 14:47:00





             Test Item    Value        Reference Range Interpretation Comments

 

             UA Color (test code = UA Color) YELLOW                             

    



[facility] [id number] 14:47:00





             Test Item    Value        Reference Range Interpretation Comments

 

             UA Turbidity (test code = UA CLOUDY                                

 



             Turbidity)                                          



[facility] [id number] 14:47:00





             Test Item    Value        Reference Range Interpretation Comments

 

             UA Spec Grav (test code = UA Spec 1.017 1      1.001-1.035         

      



             Grav)                                               



[facility] AND [id number] 14:47:00





             Test Item    Value        Reference Range Interpretation Comments

 

             UA pH (test code = UA pH) 5.5 1        5.0-8.0                   



[facility] AND [id number] 14:47:00





             Test Item    Value        Reference Range Interpretation Comments

 

             UA Glucose (test code = UA Glucose) NEGATIVE                       

        



[facility] AND [id number] 14:47:00





             Test Item    Value        Reference Range Interpretation Comments

 

             UA Bili (test code = UA Bili) NEGATIVE                             

  



[facility] AND [id number] 14:47:00





             Test Item    Value        Reference Range Interpretation Comments

 

             UA Ketones (test code = UA Ketones) NEGATIVE                       

        



[facility] AND [id number] 14:47:00





             Test Item    Value        Reference Range Interpretation Comments

 

             UA Blood (test code = UA Blood) 1+                                 

    



[facility] AND [id number] 14:47:00





             Test Item    Value        Reference Range Interpretation Comments

 

             UA Protein (test code = UA Protein) 1+                             

        



[facility] [id number] 14:47:00





             Test Item    Value        Reference Range Interpretation Comments

 

             UA Nitrite (test code = UA Nitrite) POSITIVE                       

        



[facility] AND [id number] 14:47:00





             Test Item    Value        Reference Range Interpretation Comments

 

             UA Leuk Est (test code = UA Leuk Est) 2+                           

          



[facility] AND [id number] 14:47:00





             Test Item    Value        Reference Range Interpretation Comments

 

             UA WBC (test code = UA WBC) > OR = 60                              



[facility] AND [id number] 14:47:00





             Test Item    Value        Reference Range Interpretation Comments

 

             UA RBC (test code = UA RBC) 0-2                                    



[facility] [id number] 14:47:00





             Test Item    Value        Reference Range Interpretation Comments

 

             UA Sq Epi (test code = UA Sq Epi) NONE SEEN                        

      



[facility] AND [id number] 14:47:00





             Test Item    Value        Reference Range Interpretation Comments

 

             UA Bacteria (test code = UA Bacteria) MANY                         

          



[facility] AND [id number] 14:47:00





             Test Item    Value        Reference Range Interpretation Comments

 

             UA Hyal Cast (test code = UA Hyal NONE SEEN                        

      



             Cast)                                               



[facility] AND [id number] 14:47:00





             Test Item    Value        Reference Range Interpretation Comments

 

             UA Reflex (test code CULTURE INDICATED -                           



             = UA Reflex) RESULTS TO FOLLOW                           



[facility]2020-08-06 14:47:00





             Test Item    Value        Reference Range Interpretation Comments

 

             U Creat mg/dL (test code = U Creat 114                       

       



             mg/dL)                                              



[facility]2020-08-06 14:47:00





             Test Item    Value        Reference Range Interpretation Comments

 

             U Alb (test code = U Alb) 16.7                                   



[place] [id number] 14:47:00





             Test Item    Value        Reference Range Interpretation Comments

 

             U Alb/Crea (test code = U Alb/Crea) 146                            

        



[facility]2020-08-06 14:47:00





             Test Item    Value        Reference Range Interpretation Comments

 

             Vitamin D, 25-OH, Total (test code = 37                      

         



             Vitamin D, 25-OH, Total)                                        



[place] 239490-08-06 14:47:00





             Test Item    Value        Reference Range Interpretation Comments

 

             U Creat mg/dL (test code = U Creat 114                       

       



             mg/dL)                                              



[facility]2020-08-06 14:47:00





             Test Item    Value        Reference Range Interpretation Comments

 

             U Prot/Creat (test code = U Prot/Creat) 430                  

            



[facility]2020-08-06 14:47:00





             Test Item    Value        Reference Range Interpretation Comments

 

             U Prot/Creat (test code = U 0.430 1      0.021-0.161               



             Prot/Creat)                                         



[facility]2020-08-06 14:47:00





             Test Item    Value        Reference Range Interpretation Comments

 

             U Protein (test code = U Protein) 49           5-24                

      



[place] 239490-08-06 14:47:00





             Test Item    Value        Reference Range Interpretation Comments

 

             Glucose Lvl (test code = Glucose Lvl) 103          65-99           

          



[place] 239490-08-06 14:47:00





             Test Item    Value        Reference Range Interpretation Comments

 

             BUN (test code = BUN) 24           7-25                      



[place] 239490-08-06 14:47:00





             Test Item    Value        Reference Range Interpretation Comments

 

             Creatinine Lvl (test code = Creatinine 1.32         0.50-0.99      

           



             Lvl)                                                



[place] 239490-08-06 14:47:00





             Test Item    Value        Reference Range Interpretation Comments

 

             eGFR NON-AFR. AMERICAN (test code = 43                             

        



             eGFR NON-AFR. AMERICAN)                                        



[place] 239490-08-06 14:47:00





             Test Item    Value        Reference Range Interpretation Comments

 

             eGFR  (test code = eGFR 50                         

            



             )                                        



[place] 239490-08-06 14:47:00





             Test Item    Value        Reference Range Interpretation Comments

 

             B/C Ratio (test code = B/C Ratio) 18           6-22                

      



[place] 239490-08-06 14:47:00





             Test Item    Value        Reference Range Interpretation Comments

 

             Sodium Lvl (test code = Sodium Lvl) 138          135-146           

        



[place] 239490-08-06 14:47:00





             Test Item    Value        Reference Range Interpretation Comments

 

             Potassium Lvl (test code = Potassium 4.4          3.5-5.3          

         



             Lvl)                                                



[facility]2020-08-06 14:47:00





             Test Item    Value        Reference Range Interpretation Comments

 

             Chloride Lvl (test code = Chloride Lvl) 102                  

            



[place] 239490-08-06 14:47:00





             Test Item    Value        Reference Range Interpretation Comments

 

             CO2 (test code = CO2) 30           20-32                     



[place] 239490-08-06 14:47:00





             Test Item    Value        Reference Range Interpretation Comments

 

             Calcium Lvl (test code = Calcium Lvl) 9.4          8.6-10.4        

          



[place] 239490-08-06 14:47:00





             Test Item    Value        Reference Range Interpretation Comments

 

             Phosphorus (test code = Phosphorus) 4.8          2.5-4.5           

        



[place] 239490-08-06 14:47:00





             Test Item    Value        Reference Range Interpretation Comments

 

             Albumin Lvl (test code = Albumin Lvl) 3.9          3.6-5.1         

          



[place] 981740-08-06 14:47:00





             Test Item    Value        Reference Range Interpretation Comments

 

             Plt Count Estimated (test code = DECREASED                         

     



             Plt Count Estimated)                                        



[id number]





             Test Item    Value        Reference Range Interpretation Comments

 

             WBC X 10x3 (test code = WBC X 10x3) 7.2          3.8-10.8          

        



[place] 981740-08-06 14:47:00





             Test Item    Value        Reference Range Interpretation Comments

 

             RBC X 10x6 (test code = RBC X 10x6) 3.71         3.80-5.10         

        



[id number] 14:47:00





             Test Item    Value        Reference Range Interpretation Comments

 

             Hgb (test code = Hgb) 10.7         11.7-15.5                 



[facility]landHtatbnbJGGWHWSVZR3915-62-34 14:47:00





             Test Item    Value        Reference Range Interpretation Comments

 

             Hct (test code = Hct) 33.9         35.0-45.0                 



[facility]landBsfwjymFRHHTLYENE2251-02-89 14:47:00





             Test Item    Value        Reference Range Interpretation Comments

 

             MCV (test code = MCV) 91.4         80.0-100.0                



[facility]landNmiarjmJQJWKJBEGZ6444-21-81 14:47:00





             Test Item    Value        Reference Range Interpretation Comments

 

             MCH (test code = MCH) 28.8 pg      27.0-33.0                 



[facility]landStzwzqwWMTUULTZFQ0160-61-49 14:47:00





             Test Item    Value        Reference Range Interpretation Comments

 

             MCHC (test code = MCHC) 31.6         32.0-36.0                 



[facility] PearlandJczympcMESNMHDTLI6796-23-38 14:47:00





             Test Item    Value        Reference Range Interpretation Comments

 

             RDW (test code = RDW) 13.9         11.0-15.0                 



[facility]landFnhhjvxPNDZJWRJID2537-90-00 14:47:00





             Test Item    Value        Reference Range Interpretation Comments

 

             Platelet (test code = Platelet) 110          140-400               

    



[facility]landMzvtubcHCLCWSNBPK6699-37-57 14:47:00





             Test Item    Value        Reference Range Interpretation Comments

 

             MPV (test code = MPV) 11.7         7.5-12.5                  



[facility]landKhdjyvgSJWHSFNYXV8680-95-74 14:47:00





             Test Item    Value        Reference Range Interpretation Comments

 

             Neutrophils # (test code = Neutrophils 5414         6753-5338      

           



             #)                                                  



[facility]landHalqidyPJBIHZHHHQ8148-39-11 14:47:00





             Test Item    Value        Reference Range Interpretation Comments

 

             Lymphocytes # (test code = Lymphocytes 1152         850-3900       

           



             #)                                                  



[facility]landGucjczhNQJERMIGIX9090-87-38 14:47:00





             Test Item    Value        Reference Range Interpretation Comments

 

             Monocytes # (test code = Monocytes #) 461          200-950         

          



[facility] PearlandTxcyovbEXXPMKXTKY8811-29-47 14:47:00





             Test Item    Value        Reference Range Interpretation Comments

 

             Eosinophils # (test code = Eosinophils 122                   

           



             #)                                                  



[facility] PearlandXvdgqzeTPVMZKNRAL1651-84-02 14:47:00





             Test Item    Value        Reference Range Interpretation Comments

 

             Basophils # (test code 50           See_Comment                [Aut

omated message] The



             = Basophils #)                                        system which 

generated



                                                                 this result tra

nsmitted



                                                                 reference range

: <=200.



                                                                 The reference r

cheyenne was



                                                                 not used to int

erpret



                                                                 this result as



                                                                 normal/abnormal

.



[facility]QS[facility]





             Test Item    Value        Reference Range Interpretation Comments

 

             Segs (test code = Segs) 75.2                                   



[facility] HospitalWpdkvguPCHUTIHUZC9061-10-60 14:47:00





             Test Item    Value        Reference Range Interpretation Comments

 

             Lymphocytes (test code = Lymphocytes) 16.0                         

          



[facility] HospitalZnchgkmBKYUSMDUGC1458-33-64 14:47:00





             Test Item    Value        Reference Range Interpretation Comments

 

             Monocytes (test code = Monocytes) 6.4                              

      



[facility] HospitalWxirbngDXSUGHUTYP3228-95-58 14:47:00





             Test Item    Value        Reference Range Interpretation Comments

 

             Eosinophils (test code = Eosinophils) 1.7                          

          



[facility] HospitalJhyhspcOSGZLLYDIW4356-51-74 14:47:00





             Test Item    Value        Reference Range Interpretation Comments

 

             Basophils (test code = Basophils) 0.7                              

      



[facility] [id number] 14:47:00





             Test Item    Value        Reference Range Interpretation Comments

 

             Result 2 (Urine Culture) See Result Comment                        

   



             (test code = Result 2                                        



             (Urine Culture))                                        



[facility] AND [id number] 14:47:00





             Test Item    Value        Reference Range Interpretation Comments

 

             UA Color (test code = UA Color) YELLOW                             

    



[facility] AND [id number] 14:47:00





             Test Item    Value        Reference Range Interpretation Comments

 

             UA Turbidity (test code = UA CLOUDY                                

 



             Turbidity)                                          



[facility] AND [id number] 14:47:00





             Test Item    Value        Reference Range Interpretation Comments

 

             UA Spec Grav (test code = UA Spec 1.017 1      1.001-1.035         

      



             Grav)                                               



[facility] AND [id number] 14:47:00





             Test Item    Value        Reference Range Interpretation Comments

 

             UA pH (test code = UA pH) 5.5 1        5.0-8.0                   



[facility] AND [id number] 14:47:00





             Test Item    Value        Reference Range Interpretation Comments

 

             UA Glucose (test code = UA Glucose) NEGATIVE                       

        



[facility] AND [id number] 14:47:00





             Test Item    Value        Reference Range Interpretation Comments

 

             UA Bili (test code = UA Bili) NEGATIVE                             

  



[facility] [id number] 14:47:00





             Test Item    Value        Reference Range Interpretation Comments

 

             UA Ketones (test code = UA Ketones) NEGATIVE                       

        



[facility] [id number] 14:47:00





             Test Item    Value        Reference Range Interpretation Comments

 

             UA Blood (test code = UA Blood) 1+                                 

    



[facility] [id number] 14:47:00





             Test Item    Value        Reference Range Interpretation Comments

 

             UA Protein (test code = UA Protein) 1+                             

        



[facility] [id number] 14:47:00





             Test Item    Value        Reference Range Interpretation Comments

 

             UA Nitrite (test code = UA Nitrite) POSITIVE                       

        



[facility] [id number] 14:47:00





             Test Item    Value        Reference Range Interpretation Comments

 

             UA Leuk Est (test code = UA Leuk Est) 2+                           

          



[facility] [id number] 14:47:00





             Test Item    Value        Reference Range Interpretation Comments

 

             UA WBC (test code = UA WBC) > OR = 60                              



[facility] [id number] 14:47:00





             Test Item    Value        Reference Range Interpretation Comments

 

             UA RBC (test code = UA RBC) 0-2                                    



[facility] [id number] 14:47:00





             Test Item    Value        Reference Range Interpretation Comments

 

             UA Sq Epi (test code = UA Sq Epi) NONE SEEN                        

      



[facility] [id number] 14:47:00





             Test Item    Value        Reference Range Interpretation Comments

 

             UA Bacteria (test code = UA Bacteria) MANY                         

          



[facility] [id number] 14:47:00





             Test Item    Value        Reference Range Interpretation Comments

 

             UA Hyal Cast (test code = UA Hyal NONE SEEN                        

      



             Cast)                                               



[facility] [id number] 14:47:00





             Test Item    Value        Reference Range Interpretation Comments

 

             UA Reflex (test code CULTURE INDICATED -                           



             = UA Reflex) RESULTS TO FOLLOW                           



[facility] [id number] 14:47:00





             Test Item    Value        Reference Range Interpretation Comments

 

             U Creat mg/dL (test code = U Creat 114                       

       



             mg/dL)                                              



[facility] [id number] 14:47:00





             Test Item    Value        Reference Range Interpretation Comments

 

             U Alb (test code = U Alb) 16.7                                   



[facility] [id number] 14:47:00





             Test Item    Value        Reference Range Interpretation Comments

 

             U Alb/Crea (test code = U Alb/Crea) 146                            

        



[place] 239490-08-06 14:47:00





             Test Item    Value        Reference Range Interpretation Comments

 

             Vitamin D, 25-OH, Total (test code = 37                      

         



             Vitamin D, 25-OH, Total)                                        



[place] 239490-08-06 14:47:00





             Test Item    Value        Reference Range Interpretation Comments

 

             U Creat mg/dL (test code = U Creat 114                       

       



             mg/dL)                                              



[place] 239490-08-06 14:47:00





             Test Item    Value        Reference Range Interpretation Comments

 

             U Prot/Creat (test code = U Prot/Creat) 430                  

            



[place] 239490-08-06 14:47:00





             Test Item    Value        Reference Range Interpretation Comments

 

             U Prot/Creat (test code = U 0.430 1      0.021-0.161               



             Prot/Creat)                                         



[facility]2020-08-06 14:47:00





             Test Item    Value        Reference Range Interpretation Comments

 

             U Protein (test code = U Protein) 49           5-24                

      



[place] 239490-08-06 14:47:00





             Test Item    Value        Reference Range Interpretation Comments

 

             Glucose Lvl (test code = Glucose Lvl) 103          65-99           

          



[facility]2020-08-06 14:47:00





             Test Item    Value        Reference Range Interpretation Comments

 

             BUN (test code = BUN) 24           7-25                      



[facility]2020-08-06 14:47:00





             Test Item    Value        Reference Range Interpretation Comments

 

             Creatinine Lvl (test code = Creatinine 1.32         0.50-0.99      

           



             Lvl)                                                



[place] 239490-08-06 14:47:00





             Test Item    Value        Reference Range Interpretation Comments

 

             eGFR NON-AFR. AMERICAN (test code = 43                             

        



             eGFR NON-AFR. AMERICAN)                                        



[place] 239490-08-06 14:47:00





             Test Item    Value        Reference Range Interpretation Comments

 

             eGFR  (test code = eGFR 50                         

            



             )                                        



[place] [address]-08-06 14:47:00





             Test Item    Value        Reference Range Interpretation Comments

 

             B/C Ratio (test code = B/C Ratio) 18           6-22                

      



[place] 239490-08-06 14:47:00





             Test Item    Value        Reference Range Interpretation Comments

 

             Sodium Lvl (test code = Sodium Lvl) 138          135-146           

        



[place] 239490-08-06 14:47:00





             Test Item    Value        Reference Range Interpretation Comments

 

             Potassium Lvl (test code = Potassium 4.4          3.5-5.3          

         



             Lvl)                                                



[place] [address]-08-06 14:47:00





             Test Item    Value        Reference Range Interpretation Comments

 

             Chloride Lvl (test code = Chloride Lvl) 102                  

            



[place] 239490-08-06 14:47:00





             Test Item    Value        Reference Range Interpretation Comments

 

             CO2 (test code = CO2) 30           20-32                     



[place] [address]-08-06 14:47:00





             Test Item    Value        Reference Range Interpretation Comments

 

             Calcium Lvl (test code = Calcium Lvl) 9.4          8.6-10.4        

          



[place] 239490-08-06 14:47:00





             Test Item    Value        Reference Range Interpretation Comments

 

             Phosphorus (test code = Phosphorus) 4.8          2.5-4.5           

        



[place] 239490-08-06 14:47:00





             Test Item    Value        Reference Range Interpretation Comments

 

             Albumin Lvl (test code = Albumin Lvl) 3.9          3.6-5.1         

          



[place] [address]-08-06 14:47:00





             Test Item    Value        Reference Range Interpretation Comments

 

             Plt Count Estimated (test code = DECREASED                         

     



             Plt Count Estimated)                                        



[place] [address]-08-06 14:47:00





             Test Item    Value        Reference Range Interpretation Comments

 

             WBC X 10x3 (test code = WBC X 10x3) 7.2          3.8-10.8          

        



[place] [address]-08-06 14:47:00





             Test Item    Value        Reference Range Interpretation Comments

 

             RBC X 10x6 (test code = RBC X 10x6) 3.71         3.80-5.10         

        



[place] [address]-08-06 14:47:00





             Test Item    Value        Reference Range Interpretation Comments

 

             Hgb (test code = Hgb) 10.7         11.7-15.5                 



[place] [address]-08-06 14:47:00





             Test Item    Value        Reference Range Interpretation Comments

 

             Hct (test code = Hct) 33.9         35.0-45.0                 



[place] [address]-08-06 14:47:00





             Test Item    Value        Reference Range Interpretation Comments

 

             MCV (test code = MCV) 91.4         80.0-100.0                



[id number] 14:47:00





             Test Item    Value        Reference Range Interpretation Comments

 

             MCH (test code = MCH) 28.8 pg      27.0-33.0                 



[facility]landWrhbbtpVCWFRZGMIZ0790-38-64 14:47:00





             Test Item    Value        Reference Range Interpretation Comments

 

             MCHC (test code = MCHC) 31.6         32.0-36.0                 



[facility]landGvhicfgHRMQRVRXES2333-15-04 14:47:00





             Test Item    Value        Reference Range Interpretation Comments

 

             RDW (test code = RDW) 13.9         11.0-15.0                 



[place] 981740-08-06 14:47:00





             Test Item    Value        Reference Range Interpretation Comments

 

             Platelet (test code = Platelet) 110          140-400               

    



[facility]landPoacaqiDHVRKKNJNX5190-79-33 14:47:00





             Test Item    Value        Reference Range Interpretation Comments

 

             MPV (test code = MPV) 11.7         7.5-12.5                  



[facility]landRdvrnrzCBKTYMQVFG1985-03-03 14:47:00





             Test Item    Value        Reference Range Interpretation Comments

 

             Neutrophils # (test code = Neutrophils 5414         6298-2081      

           



             #)                                                  



[facility]landWprrrbsSXNVDUQGUJ8149-03-93 14:47:00





             Test Item    Value        Reference Range Interpretation Comments

 

             Lymphocytes # (test code = Lymphocytes 1152         850-3900       

           



             #)                                                  



[facility]landEtwlzstWXRSQJJQJT2407-79-53 14:47:00





             Test Item    Value        Reference Range Interpretation Comments

 

             Monocytes # (test code = Monocytes #) 461          200-950         

          



[facility]landNsrzvsjWIGJTICVCU5795-25-17 14:47:00





             Test Item    Value        Reference Range Interpretation Comments

 

             Eosinophils # (test code = Eosinophils 122                   

           



             #)                                                  



[facility]landZdegemrJSVZXPIQNZ6009-68-95 14:47:00





             Test Item    Value        Reference Range Interpretation Comments

 

             Basophils # (test code 50           See_Comment                [Aut

omated message] The



             = Basophils #)                                        system which 

generated



                                                                 this result tra

nsmitted



                                                                 reference range

: <=200.



                                                                 The reference r

cheyenne was



                                                                 not used to int

erpret



                                                                 this result as



                                                                 normal/abnormal

.



[facility]landZtcuzzaFHQPGTEDFK8352-58-02 14:47:00





             Test Item    Value        Reference Range Interpretation Comments

 

             Segs (test code = Segs) 75.2                                   



[facility]landPmvdnctQJDBAJUYNN2626-68-47 14:47:00





             Test Item    Value        Reference Range Interpretation Comments

 

             Lymphocytes (test code = Lymphocytes) 16.0                         

          



[facility]lq[facility]wrHFACEIFDTH2370[facility]66[facility]74 14[facility]47:00





             Test Item    Value        Reference Range Interpretation Comments

 

             Monocytes (test code = Monocytes) 6.4                              

      



[facility] CenterWhufxlaAWWAPVBQUP6526-05-21 14:47:00





             Test Item    Value        Reference Range Interpretation Comments

 

             Eosinophils (test code = Eosinophils) 1.7                          

          



[facility] CenterQalccarVDUWKRWMDP5746-10-46 14:47:00





             Test Item    Value        Reference Range Interpretation Comments

 

             Basophils (test code = Basophils) 0.7                              

      



[facility] [id number] 14:47:00





             Test Item    Value        Reference Range Interpretation Comments

 

             Result 2 (Urine Culture) See Result Comment                        

   



             (test code = Result 2                                        



             (Urine Culture))                                        



[facility] AND [id number] 14:47:00





             Test Item    Value        Reference Range Interpretation Comments

 

             UA Color (test code = UA Color) YELLOW                             

    



[facility] AND [id number] 14:47:00





             Test Item    Value        Reference Range Interpretation Comments

 

             UA Turbidity (test code = UA CLOUDY                                

 



             Turbidity)                                          



[facility] AND [id number] 14:47:00





             Test Item    Value        Reference Range Interpretation Comments

 

             UA Spec Grav (test code = UA Spec 1.017 1      1.001-1.035         

      



             Grav)                                               



[facility] AND [id number] 14:47:00





             Test Item    Value        Reference Range Interpretation Comments

 

             UA pH (test code = UA pH) 5.5 1        5.0-8.0                   



[facility] [id number] 14:47:00





             Test Item    Value        Reference Range Interpretation Comments

 

             UA Glucose (test code = UA Glucose) NEGATIVE                       

        



[facility] [id number] 14:47:00





             Test Item    Value        Reference Range Interpretation Comments

 

             UA Bili (test code = UA Bili) NEGATIVE                             

  



[facility] [id number] 14:47:00





             Test Item    Value        Reference Range Interpretation Comments

 

             UA Ketones (test code = UA Ketones) NEGATIVE                       

        



[facility] [id number] 14:47:00





             Test Item    Value        Reference Range Interpretation Comments

 

             UA Blood (test code = UA Blood) 1+                                 

    



[facility] [id number] 14:47:00





             Test Item    Value        Reference Range Interpretation Comments

 

             UA Protein (test code = UA Protein) 1+                             

        



[facility] [id number] 14:47:00





             Test Item    Value        Reference Range Interpretation Comments

 

             UA Nitrite (test code = UA Nitrite) POSITIVE                       

        



[facility] [id number] 14:47:00





             Test Item    Value        Reference Range Interpretation Comments

 

             UA Leuk Est (test code = UA Leuk Est) 2+                           

          



[facility] [id number] 14:47:00





             Test Item    Value        Reference Range Interpretation Comments

 

             UA WBC (test code = UA WBC) > OR = 60                              



[facility] [id number] 14:47:00





             Test Item    Value        Reference Range Interpretation Comments

 

             UA RBC (test code = UA RBC) 0-2                                    



[facility] [id number] 14:47:00





             Test Item    Value        Reference Range Interpretation Comments

 

             UA Sq Epi (test code = UA Sq Epi) NONE SEEN                        

      



[facility] [id number] 14:47:00





             Test Item    Value        Reference Range Interpretation Comments

 

             UA Bacteria (test code = UA Bacteria) MANY                         

          



[facility] AND [id number] 14:47:00





             Test Item    Value        Reference Range Interpretation Comments

 

             UA Hyal Cast (test code = UA Hyal NONE SEEN                        

      



             Cast)                                               



[facility] AND [id number] 14:47:00





             Test Item    Value        Reference Range Interpretation Comments

 

             UA Reflex (test code CULTURE INDICATED -                           



             = UA Reflex) RESULTS TO FOLLOW                           



[facility]2020-08-06 14:47:00





             Test Item    Value        Reference Range Interpretation Comments

 

             U Creat mg/dL (test code = U Creat 114                       

       



             mg/dL)                                              



[facility]2020-08-06 14:47:00





             Test Item    Value        Reference Range Interpretation Comments

 

             U Alb (test code = U Alb) 16.7                                   



[facility]2020-08-06 14:47:00





             Test Item    Value        Reference Range Interpretation Comments

 

             U Alb/Crea (test code = U Alb/Crea) 146                            

        



[facility] [id number] 14:47:00





             Test Item    Value        Reference Range Interpretation Comments

 

             Vitamin D, 25-OH, Total (test code = 37                      

         



             Vitamin D, 25-OH, Total)                                        



[facility] [id number] 14:47:00





             Test Item    Value        Reference Range Interpretation Comments

 

             U Creat mg/dL (test code = U Creat 114                       

       



             mg/dL)                                              



[facility]2020-08-06 14:47:00





             Test Item    Value        Reference Range Interpretation Comments

 

             U Prot/Creat (test code = U Prot/Creat) 430                  

            



[place] 239490-08-06 14:47:00





             Test Item    Value        Reference Range Interpretation Comments

 

             U Prot/Creat (test code = U 0.430 1      0.021-0.161               



             Prot/Creat)                                         



[place] 239490-08-06 14:47:00





             Test Item    Value        Reference Range Interpretation Comments

 

             U Protein (test code = U Protein) 49           5-24                

      



[place] 239490-08-06 14:47:00





             Test Item    Value        Reference Range Interpretation Comments

 

             Glucose Lvl (test code = Glucose Lvl) 103          65-99           

          



[place] 239490-08-06 14:47:00





             Test Item    Value        Reference Range Interpretation Comments

 

             BUN (test code = BUN) 24           7-25                      



[place] 239490-08-06 14:47:00





             Test Item    Value        Reference Range Interpretation Comments

 

             Creatinine Lvl (test code = Creatinine 1.32         0.50-0.99      

           



             Lvl)                                                



[facility]2020-08-06 14:47:00





             Test Item    Value        Reference Range Interpretation Comments

 

             eGFR NON-AFR. AMERICAN (test code = 43                             

        



             eGFR NON-AFR. AMERICAN)                                        



[facility]2020-08-06 14:47:00





             Test Item    Value        Reference Range Interpretation Comments

 

             eGFR  (test code = eGFR 50                         

            



             )                                        



[place] [address]-08-06 14:47:00





             Test Item    Value        Reference Range Interpretation Comments

 

             B/C Ratio (test code = B/C Ratio) 18           6-22                

      



[place] 239490-08-06 14:47:00





             Test Item    Value        Reference Range Interpretation Comments

 

             Sodium Lvl (test code = Sodium Lvl) 138          135-146           

        



[place] 239490-08-06 14:47:00





             Test Item    Value        Reference Range Interpretation Comments

 

             Potassium Lvl (test code = Potassium 4.4          3.5-5.3          

         



             Lvl)                                                



[place] 239490-08-06 14:47:00





             Test Item    Value        Reference Range Interpretation Comments

 

             Chloride Lvl (test code = Chloride Lvl) 102                  

            



[place] 239490-08-06 14:47:00





             Test Item    Value        Reference Range Interpretation Comments

 

             CO2 (test code = CO2) 30           20-32                     



[facility]2020-08-06 14:47:00





             Test Item    Value        Reference Range Interpretation Comments

 

             Calcium Lvl (test code = Calcium Lvl) 9.4          8.6-10.4        

          



[facility]2020-08-06 14:47:00





             Test Item    Value        Reference Range Interpretation Comments

 

             Phosphorus (test code = Phosphorus) 4.8          2.5-4.5           

        



[place] 239490-08-06 14:47:00





             Test Item    Value        Reference Range Interpretation Comments

 

             Albumin Lvl (test code = Albumin Lvl) 3.9          3.6-5.1         

          



[place] 981740-08-06 14:47:00





             Test Item    Value        Reference Range Interpretation Comments

 

             Plt Count Estimated (test code = DECREASED                         

     



             Plt Count Estimated)                                        



[place] 981740-08-06 14:47:00





             Test Item    Value        Reference Range Interpretation Comments

 

             WBC X 10x3 (test code = WBC X 10x3) 7.2          3.8-10.8          

        



[place] [address]-08-06 14:47:00





             Test Item    Value        Reference Range Interpretation Comments

 

             RBC X 10x6 (test code = RBC X 10x6) 3.71         3.80-5.10         

        



[place] [address]-08-06 14:47:00





             Test Item    Value        Reference Range Interpretation Comments

 

             Hgb (test code = Hgb) 10.7         11.7-15.5                 



[place] [address]-08-06 14:47:00





             Test Item    Value        Reference Range Interpretation Comments

 

             Hct (test code = Hct) 33.9         35.0-45.0                 



[place] [address]-08-06 14:47:00





             Test Item    Value        Reference Range Interpretation Comments

 

             MCV (test code = MCV) 91.4         80.0-100.0                



[place] [address]-08-06 14:47:00





             Test Item    Value        Reference Range Interpretation Comments

 

             MCH (test code = MCH) 28.8 pg      27.0-33.0                 



[place] [address]-08-06 14:47:00





             Test Item    Value        Reference Range Interpretation Comments

 

             MCHC (test code = MCHC) 31.6         32.0-36.0                 



[place] [address]-08-06 14:47:00





             Test Item    Value        Reference Range Interpretation Comments

 

             RDW (test code = RDW) 13.9         11.0-15.0                 



[facility]Q3401[facility]39[facility] 14[facility]47:00





             Test Item    Value        Reference Range Interpretation Comments

 

             Platelet (test code = Platelet) 110          140-400               

    



[facility] HospitalRmmsxomFGFTYLFTRU1098-20-75 14:47:00





             Test Item    Value        Reference Range Interpretation Comments

 

             MPV (test code = MPV) 11.7         7.5-12.5                  



[facility]clzvyuULHVJOJIUI3505-92-68 14:47:00





             Test Item    Value        Reference Range Interpretation Comments

 

             Neutrophils # (test code = Neutrophils 5414         7032-7110      

           



             #)                                                  



[facility] HospitalLhojudgUSIGJOEBRA4027-69-56 14:47:00





             Test Item    Value        Reference Range Interpretation Comments

 

             Lymphocytes # (test code = Lymphocytes 1152         850-3900       

           



             #)                                                  



[facility] HospitalMwvxckyIUKIXGQPES1630-37-32 14:47:00





             Test Item    Value        Reference Range Interpretation Comments

 

             Monocytes # (test code = Monocytes #) 461          200-950         

          



[facility] HospitalHsrsibiCCIJMMVGHG7649-86-96 14:47:00





             Test Item    Value        Reference Range Interpretation Comments

 

             Eosinophils # (test code = Eosinophils 122                   

           



             #)                                                  



[facility] HospitalXkjcsryCBYUJXUULT8275-71-42 14:47:00





             Test Item    Value        Reference Range Interpretation Comments

 

             Basophils # (test code 50           See_Comment                [Aut

omated message] The



             = Basophils #)                                        system which 

generated



                                                                 this result tra

nsmitted



                                                                 reference range

: <=200.



                                                                 The reference r

cheyenne was



                                                                 not used to int

erpret



                                                                 this result as



                                                                 normal/abnormal

.



[facility]landLgunbqbEFNHUKLUCG7081-16-13 14:47:00





             Test Item    Value        Reference Range Interpretation Comments

 

             Segs (test code = Segs) 75.2                                   



[facility]landMghueepMHQOODAAKE3606-50-39 14:47:00





             Test Item    Value        Reference Range Interpretation Comments

 

             Lymphocytes (test code = Lymphocytes) 16.0                         

          



[facility] HospitalYogjgzpNNPMAYCKDW3256-75-72 14:47:00





             Test Item    Value        Reference Range Interpretation Comments

 

             Monocytes (test code = Monocytes) 6.4                              

      



[facility]landRdgvmdrHMVBJFPEWN6026-48-52 14:47:00





             Test Item    Value        Reference Range Interpretation Comments

 

             Eosinophils (test code = Eosinophils) 1.7                          

          



[facility] HospitalVtbmqicDMZGTLREVE4908-24-49 14:47:00





             Test Item    Value        Reference Range Interpretation Comments

 

             Basophils (test code = Basophils) 0.7                              

      



[facility] [id number] 14:47:00





             Test Item    Value        Reference Range Interpretation Comments

 

             Result 2 (Urine Culture) See Result Comment                        

   



             (test code = Result 2                                        



             (Urine Culture))                                        



[facility] AND [id number] 14:47:00





             Test Item    Value        Reference Range Interpretation Comments

 

             UA Color (test code = UA Color) YELLOW                             

    



[facility] AND [id number] 14:47:00





             Test Item    Value        Reference Range Interpretation Comments

 

             UA Turbidity (test code = UA CLOUDY                                

 



             Turbidity)                                          



[facility] AND [id number] 14:47:00





             Test Item    Value        Reference Range Interpretation Comments

 

             UA Spec Grav (test code = UA Spec 1.017 1      1.001-1.035         

      



             Grav)                                               



[facility] AND [id number] 14:47:00





             Test Item    Value        Reference Range Interpretation Comments

 

             UA pH (test code = UA pH) 5.5 1        5.0-8.0                   



[facility] AND [id number] 14:47:00





             Test Item    Value        Reference Range Interpretation Comments

 

             UA Glucose (test code = UA Glucose) NEGATIVE                       

        



[facility] AND [id number] 14:47:00





             Test Item    Value        Reference Range Interpretation Comments

 

             UA Bili (test code = UA Bili) NEGATIVE                             

  



[facility] AND [id number] 14:47:00





             Test Item    Value        Reference Range Interpretation Comments

 

             UA Ketones (test code = UA Ketones) NEGATIVE                       

        



[facility] AND [id number] 14:47:00





             Test Item    Value        Reference Range Interpretation Comments

 

             UA Blood (test code = UA Blood) 1+                                 

    



[facility] AND [id number] 14:47:00





             Test Item    Value        Reference Range Interpretation Comments

 

             UA Protein (test code = UA Protein) 1+                             

        



[facility] AND [id number] 14:47:00





             Test Item    Value        Reference Range Interpretation Comments

 

             UA Nitrite (test code = UA Nitrite) POSITIVE                       

        



[facility] AND [id number] 14:47:00





             Test Item    Value        Reference Range Interpretation Comments

 

             UA Leuk Est (test code = UA Leuk Est) 2+                           

          



[facility] AND [id number] 14:47:00





             Test Item    Value        Reference Range Interpretation Comments

 

             UA WBC (test code = UA WBC) > OR = 60                              



[facility] AND [id number] 14:47:00





             Test Item    Value        Reference Range Interpretation Comments

 

             UA RBC (test code = UA RBC) 0-2                                    



[facility] [id number] 14:47:00





             Test Item    Value        Reference Range Interpretation Comments

 

             UA Sq Epi (test code = UA Sq Epi) NONE SEEN                        

      



[facility] [id number] 14:47:00





             Test Item    Value        Reference Range Interpretation Comments

 

             UA Bacteria (test code = UA Bacteria) MANY                         

          



[facility] [id number] 14[facility]47[facility]00





             Test Item    Value        Reference Range Interpretation Comments

 

             UA Hyal Cast (test code = UA Hyal NONE SEEN                        

      



             Cast)                                               



[facility] [id number] 14:47:00





             Test Item    Value        Reference Range Interpretation Comments

 

             UA Reflex (test code CULTURE INDICATED -                           



             = UA Reflex) RESULTS TO FOLLOW                           



[facility] [id number] 14:47:00





             Test Item    Value        Reference Range Interpretation Comments

 

             U Creat mg/dL (test code = U Creat 114                       

       



             mg/dL)                                              



[facility] [id number] 14:47:00





             Test Item    Value        Reference Range Interpretation Comments

 

             U Alb (test code = U Alb) 16.7                                   



[facility] [id number] 14:47:00





             Test Item    Value        Reference Range Interpretation Comments

 

             U Alb/Crea (test code = U Alb/Crea) 146                            

        



[facility] PROTEIN ELECTROPHORESIS-24HR [id number] 08:01:00





             Test Item    Value        Reference Range Interpretation Comments

 

             CREATININE, 24 HOUR 1.45 g/24 h  0.50-2.15                 



             URINE (test code =                                        



             28780517)                                           

 

             PROTEIN/CREATININE 292 mg/g creat < OR = 114   H            



             RATION (test code =                                        



             98241596)                                           

 

             PROTEIN, TOTAL 24 HR  mg/24 h  <150         H            TEST

 PERFORMED



             (test code = 19049311)                                        AT:QU

EST



                                                                 [facility]

IN



                                                                 [address] [place] [address], [place]



                                                                 [id number] FELIX MD

 

             ALBUMIN (test code = 35 %                                   



             31437096)                                           

 

             ALPHA-1-GLOBULINS (test 10 %                                   



             code = 98320721)                                        

 

             ALPHA-2-GLOBULINS (test 12 %                                   



             code = 45951013)                                        

 

             BETA GLOBULINS (test 25 %                                   



             code = 64879788)                                        

 

             GAMMA GLOBULINS (test 18 %                                   



             code = 38988185)                                        

 

             INTERPRETATION (test                                        Albumin

 and



             code = 83060170)                                        various aba

bulin



                                                                 fractions



                                                                 detected on



                                                                 proteinelectrop

ho



                                                                 resis. No



                                                                 abnormal protei

n



                                                                 bands



                                                                 (Bence-Jonespro

te



                                                                 inuria)



                                                                 detected.TEST



                                                                 PERFORMED



                                                                 AT:[facility]



                                                                 [facility]

IN



                                                                  [facility].[place]IDDHI ALVARE[place]



                                                                 [id number] FELIX MD



NEUTROPHIL CYTOPLASMIC [id number] 05:19:00





             Test Item    Value        Reference Range Interpretation Comments

 

             ANCA SCREEN (test NEGATIVE     NEGATIVE                  ANCA Scree

n includes



             code = 88041612)                                        evaluation 

for p-ANCA,



                                                                 c-ANCA andatypi

tayla p-ANCA.



                                                                 A positive ANCA

 screen



                                                                 reflexes to tit

erand



                                                                 pattern(s), e.g

.,



                                                                 cytoplasmic pat

tern



                                                                 (c-ANCA),perinu

clear



                                                                 pattern (p-ANCA

), or



                                                                 atypical p-ANCA



                                                                 pattern.c-ANCA 

and p-ANCA



                                                                 are observed in

 vasculitis,



                                                                 whereasatypical

 p-ANCA is



                                                                 observed in IBD



                                                                 (Inflammatory



                                                                 BowelDisease). 

Atypical



                                                                 p-ANCA is detec

kolby in about



                                                                 55% to 80% ofpa

tients with



                                                                 ulcerative coli

tis but only



                                                                 5% to 25% ofpat

ients with



                                                                 Crohn's disease

.TEST



                                                                 PERFORMED AT:[employer]



                                                                 DIAGNOSTICS/[facility]



                                                                 [id number] [facility] [place]



                                                                 [id number] MARIEE MD,PHD

,RAMILA



COMPREHENSIVE METABOLIC [id number] 06:25:00





             Test Item    Value        Reference Range Interpretation Comments

 

             GLUCOSE (test code = 06D) 115 mg/dL           H            

 

             SODIUM (test code = 01A) 137 mmol/L   136-145                   

 

             POTASSIUM (test code = 01B) 3.3 mmol/L   3.6-5.1      L            

 

             CHLORIDE (test code = 04A) 97 mmol/L           L            

 

             CO2 (test code = 02A) 32 mmol/L    22-32                     

 

             ANION GAP (test code = ANG) 11.3 mmol/L                            

 

             BUN (test code = 05D) 36 mg/dL     7-18         H            

 

             CREATININE (test code = 03E) 1.4 mg/dL    0.4-1.1      H           

 

 

             BUN/CREA (test code = BCR) 25           12-20        H            

 

             CALCIUM (test code = 09D) 8.8 mg/dL    8.3-9.5                   

 

             BILI TOTAL (test code = 11A) 1.2 mg/dL    0.2-1.0      H           

 

 

             PROTEIN (test code = 07D) 6.5 g/dL     6.4-8.2                   

 

             ALBUMIN (test code = 08D) 2.9 g/dL     3.5-4.8      L            

 

             GLOBULIN (test code = GLB) 3.6 g/dL     1.5-3.8                   

 

             ALB/GLOB (test code = AGRR) 0.8          1.0-2.6      L            

 

             ALK PHOS (test code = 35A) 97 IU/L                          

 

             AST (test code = 30A) 15 IU/L      <=42                      

 

             ALT (test code = 31A) 35 IU/L      <=78                      



CBC (INCLUDES AUTOMATED DIFFERENTIAL)2019 06:06:00





             Test Item    Value        Reference Range Interpretation Comments

 

             WBC (test code = WBC) 6.9 10\S\3/uL 4.5-11.0                  

 

             RBC (test code = RBC) 3.56 10\S\6/uL 4.20-5.60    L            

 

             HGB (test code = HBG) 10.4 g/dL    12.0-15.5    L            

 

             HCT (test code = HCT) 32.1 %       35.0-44.0    L            

 

             MCV (test code = MCV) 90.2 fL      81.0-99.0                 

 

             MCH (test code = MCH) 29.2 pg      27.0-31.0                 

 

             MCHC (test code = MCHC) 32.4 g/dL    32.0-36.0                 

 

             RDW (test code = RDW) 15.0 %       11.5-14.5    H            

 

             PLT (test code = PLT) 158 10\S\3/uL 130-400                   

 

             MPV (test code = MPV) 10.7 fL      9.4-12.4                  

 

             NEUTROP # (test code = NE#) 4.4 10\S\3/uL 1.6-8.0                  

 

 

             LYMPH # (test code = LY#) 1.8 10\S\3/uL 1.1-3.5                   

 

             MONOCYTE # (test code = MO#) 0.5 10\S\3/uL 0.0-1.1                 

  

 

             EOSINOPH # (test code = EO#) 0.2 10\S\3/uL 0.0-0.7                 

  

 

             BASOPHIL # (test code = BA#) 0.0 10\S\3/uL 0.0-0.3                 

  

 

             IG # (test code = IG#) 0.03 10\S\3/uL 0.00-0.06                 

 

             NRBC # (test code = NRBC#) 0.00 10\S\3/uL 0.00-0.01                

 

 

             NEUTROPH % (test code = NE%) 64.0 %       35.0-73.0                

 

 

             LYMPH % (test code = LY%) 26.1 %       20.0-55.0                 

 

             MONO % (test code = MO%) 6.5 %        2.5-10.0                  

 

             EOSINOPH % (test code = EO%) 2.6 %        0.0-5.0                  

 

 

             BASOPHIL % (test code = BA%) 0.4 %        0.0-2.0                  

 

 

             IG % (test code = IG%) 0.4 %        0.0-0.8                   

 

             NRBC% (test code = NRBC%) 0.0 %        0.0-0.2                   

 

             MANDIFF (test code = MDIFF) NO           NO                        

 

             RBC MORPH (test code = RBCMOR)              NORMAL                 

   



URINE [id number] 09:53:00





             Test Item    Value        Reference Range Interpretation Comments

 

             Isolate 1 (test code = Proteus mirabilis              A            



             ISO1)                                               

 

             ampicillin (test code = am)  ug/mL                    S            

 

             ampicillin/sulbactam (test  ug/mL                    S            



             code = ams)                                         

 

             piperacillin/tazobactam  ug/mL                    S            



             (test code = tzp)                                        

 

             ceftazidime (test code =  ug/mL                    S            



             jeannine)                                                

 

             ceftriaxone1 (test code =  ug/mL                    S            



             ctr)                                                

 

             cefepime (test code = fep)  ug/mL                    S            

 

             aztreonam (test code = azm)  ug/mL                    S            

 

             ertapenem (test code = etp)  ug/mL                    S            

 

             meropenem (test code = mem)  ug/mL                    S            

 

             gentamicin (test code = gm)  ug/mL                    S            

 

             tobramycin (test code =  ug/mL                    S            



             tob)                                                

 

             levofloxacin (test code =  ug/mL                    S            



             lev)                                                

 

             trimethoprim/sulfamethoxazo  ug/mL                    S            



             le (test code = sxt)                                        



PARTHYROID HORMONE (INTACT)2019 08:24:00





             Test Item    Value        Reference Range Interpretation Comments

 

             PTH INTACT (test code 166.6 pg/mL  18.4-80.1    H            



             = A85)                                              

 

             Ref Range Change ***** Please note the                           



             (test code = REF change in reference                           



             RANGE)       range ***                              



VITAMIN D TOTAL 25 (OH)2019 07:15:00





             Test Item    Value        Reference Range Interpretation Comments

 

             VITAMIN D 25(OH) (test code = VD) 36.0 ng/mL   30.0-100.0          

      



SERUM PROTEIN [id number] 06:20:00





             Test Item    Value        Reference Range Interpretation Comments

 

             PROTEIN TOTAL (test 5.7 g/dL     6.1-8.1      L            TEST PER

FORMED AT:[facility]



             code = 52901834)                                        DIAGNOSTICS

[id number]



                                                                 [facility] [place]



                                                                 [id number] FELIX MD

 

             ALBUMIN (test code = 3.2 g/dL     3.8-4.8      L            



             73161403)                                           

 

             ALPHA-1-GLOBULINS 0.4 g/dL     0.2-0.3      H            



             (test code =                                        



             52195153)                                           

 

             ALPHA-2-GLOBULINS 0.8 g/dL     0.5-0.9                   



             (test code =                                        



             30900413)                                           

 

             BETA 1 GLOBULIN (test 0.5 g/dL     0.4-0.6                   



             code = 02082595)                                        

 

             BETA 2 GLOBULIN (test 0.2 g/dL     0.2-0.5                   



             code = 10056817)                                        

 

             GAMMA GLOBULINS (test 0.6 g/dL     0.8-1.7      L            



             code = 03305800)                                        

 

             INTERPRETATION (test                                        Pattern

 consistent with



             code = 11538878)                                        an acute ph

ase



                                                                 reactionConsist

ent with



                                                                 hypogammaglobul

inemia.



                                                                 Serum free ligh

tchains



                                                                 or urine immuno

fixation



                                                                 should be consi

dered



                                                                 ifplasma cell d

yscrasias



                                                                 are a possible



                                                                 clinicaldiagnos

is.TEST



                                                                 PERFORMED AT:QU

EST



                                                                 DIAGNOSTICS-[facility]

[id number]



                                                                 [facility] [place]



                                                                 [id number] FELIX MD



[id number] 06:13:00





             Test Item    Value        Reference Range Interpretation Comments

 

             MAGNESIUM (test code = 48A) 1.7 mg/dL    1.8-2.4      L            



COMPREHENSIVE METABOLIC [id number] 06:13:00





             Test Item    Value        Reference Range Interpretation Comments

 

             GLUCOSE (test code = 06D) 110 mg/dL           H            

 

             SODIUM (test code = 01A) 140 mmol/L   136-145                   

 

             POTASSIUM (test code = 01B) 3.1 mmol/L   3.6-5.1      L            

 

             CHLORIDE (test code = 04A) 96 mmol/L           L            

 

             CO2 (test code = 02A) 34 mmol/L    22-32        H            

 

             ANION GAP (test code = ANG) 13.1 mmol/L                            

 

             BUN (test code = 05D) 33 mg/dL     7-18         H            

 

             CREATININE (test code = 03E) 1.5 mg/dL    0.4-1.1      H           

 

 

             BUN/CREA (test code = BCR) 22           12-20        H            

 

             CALCIUM (test code = 09D) 9.1 mg/dL    8.3-9.5                   

 

             BILI TOTAL (test code = 11A) 1.4 mg/dL    0.2-1.0      H           

 

 

             PROTEIN (test code = 07D) 7.0 g/dL     6.4-8.2                   

 

             ALBUMIN (test code = 08D) 3.2 g/dL     3.5-4.8      L            

 

             GLOBULIN (test code = GLB) 3.8 g/dL     1.5-3.8                   

 

             ALB/GLOB (test code = AGRR) 0.8          1.0-2.6      L            

 

             ALK PHOS (test code = 35A) 111 IU/L                         

 

             AST (test code = 30A) 18 IU/L      <=42                      

 

             ALT (test code = 31A) 43 IU/L      <=78                      



PHOSPHORUS (P04)2019 06:13:00





             Test Item    Value        Reference Range Interpretation Comments

 

             PHOSPHORUS (test code = 43D) 4.0 mg/dL    2.7-4.6                  

 



CBC (INCLUDES AUTOMATED DIFFERENTIAL)2019 05:48:00





             Test Item    Value        Reference Range Interpretation Comments

 

             WBC (test code = WBC) 9.4 10\S\3/uL 4.5-11.0                  

 

             RBC (test code = RBC) 3.73 10\S\6/uL 4.20-5.60    L            

 

             HGB (test code = HBG) 10.8 g/dL    12.0-15.5    L            

 

             HCT (test code = HCT) 33.8 %       35.0-44.0    L            

 

             MCV (test code = MCV) 90.6 fL      81.0-99.0                 

 

             MCH (test code = MCH) 29.0 pg      27.0-31.0                 

 

             MCHC (test code = MCHC) 32.0 g/dL    32.0-36.0                 

 

             RDW (test code = RDW) 15.1 %       11.5-14.5    H            

 

             PLT (test code = PLT) 189 10\S\3/uL 130-400                   

 

             MPV (test code = MPV) 11.8 fL      9.4-12.4                  

 

             NEUTROP # (test code = NE#) 7.0 10\S\3/uL 1.6-8.0                  

 

 

             LYMPH # (test code = LY#) 1.6 10\S\3/uL 1.1-3.5                   

 

             MONOCYTE # (test code = MO#) 0.7 10\S\3/uL 0.0-1.1                 

  

 

             EOSINOPH # (test code = EO#) 0.1 10\S\3/uL 0.0-0.7                 

  

 

             BASOPHIL # (test code = BA#) 0.0 10\S\3/uL 0.0-0.3                 

  

 

             IG # (test code = IG#) 0.06 10\S\3/uL 0.00-0.06                 

 

             NRBC # (test code = NRBC#) 0.00 10\S\3/uL 0.00-0.01                

 

 

             NEUTROPH % (test code = NE%) 74.5 %       35.0-73.0    H           

 

 

             LYMPH % (test code = LY%) 16.5 %       20.0-55.0    L            

 

             MONO % (test code = MO%) 7.0 %        2.5-10.0                  

 

             EOSINOPH % (test code = EO%) 1.1 %        0.0-5.0                  

 

 

             BASOPHIL % (test code = BA%) 0.3 %        0.0-2.0                  

 

 

             IG % (test code = IG%) 0.6 %        0.0-0.8                   

 

             NRBC% (test code = NRBC%) 0.0 %        0.0-0.2                   

 

             MANDIFF (test code = MDIFF) NO           NO                        

 

             RBC MORPH (test code = RBCMOR)              NORMAL                 

   



COMPREHENSIVE METABOLIC [id number] 05:30:00





             Test Item    Value        Reference Range Interpretation Comments

 

             GLUCOSE (test code = 06D) 122 mg/dL           H            

 

             SODIUM (test code = 01A) 140 mmol/L   136-145                   

 

             POTASSIUM (test code = 01B) 3.8 mmol/L   3.6-5.1                   

 

             CHLORIDE (test code = 04A) 100 mmol/L                       

 

             CO2 (test code = 02A) 32 mmol/L    22-32                     

 

             ANION GAP (test code = ANG) 11.8 mmol/L                            

 

             BUN (test code = 05D) 29 mg/dL     7-18         H            

 

             CREATININE (test code = 03E) 1.5 mg/dL    0.4-1.1      H           

 

 

             BUN/CREA (test code = BCR) 20           12-20                     

 

             CALCIUM (test code = 09D) 9.0 mg/dL    8.3-9.5                   

 

             BILI TOTAL (test code = 11A) 1.3 mg/dL    0.2-1.0      H           

 

 

             PROTEIN (test code = 07D) 7.1 g/dL     6.4-8.2                   

 

             ALBUMIN (test code = 08D) 3.5 g/dL     3.5-4.8                   

 

             GLOBULIN (test code = GLB) 3.6 g/dL     1.5-3.8                   

 

             ALB/GLOB (test code = AGRR) 1.0          1.0-2.6                   

 

             ALK PHOS (test code = 35A) 119 IU/L                         

 

             AST (test code = 30A) 22 IU/L      <=42                      

 

             ALT (test code = 31A) 49 IU/L      <=78                      



CBC (INCLUDES AUTOMATED DIFFERENTIAL)2019 05:18:00





             Test Item    Value        Reference Range Interpretation Comments

 

             WBC (test code = WBC) 9.0 10\S\3/uL 4.5-11.0                  

 

             RBC (test code = RBC) 3.94 10\S\6/uL 4.20-5.60    L            

 

             HGB (test code = HBG) 11.4 g/dL    12.0-15.5    L            

 

             HCT (test code = HCT) 35.8 %       35.0-44.0                 

 

             MCV (test code = MCV) 90.9 fL      81.0-99.0                 

 

             MCH (test code = MCH) 28.9 pg      27.0-31.0                 

 

             MCHC (test code = MCHC) 31.8 g/dL    32.0-36.0    L            

 

             RDW (test code = RDW) 14.8 %       11.5-14.5    H            

 

             PLT (test code = PLT) 164 10\S\3/uL 130-400                   

 

             MPV (test code = MPV) 11.6 fL      9.4-12.4                  

 

             NEUTROP # (test code = NE#) 6.8 10\S\3/uL 1.6-8.0                  

 

 

             LYMPH # (test code = LY#) 1.4 10\S\3/uL 1.1-3.5                   

 

             MONOCYTE # (test code = MO#) 0.6 10\S\3/uL 0.0-1.1                 

  

 

             EOSINOPH # (test code = EO#) 0.1 10\S\3/uL 0.0-0.7                 

  

 

             BASOPHIL # (test code = BA#) 0.0 10\S\3/uL 0.0-0.3                 

  

 

             IG # (test code = IG#) 0.06 10\S\3/uL 0.00-0.06                 

 

             NRBC # (test code = NRBC#) 0.00 10\S\3/uL 0.00-0.01                

 

 

             NEUTROPH % (test code = NE%) 75.6 %       35.0-73.0    H           

 

 

             LYMPH % (test code = LY%) 15.9 %       20.0-55.0    L            

 

             MONO % (test code = MO%) 6.5 %        2.5-10.0                  

 

             EOSINOPH % (test code = EO%) 0.9 %        0.0-5.0                  

 

 

             BASOPHIL % (test code = BA%) 0.4 %        0.0-2.0                  

 

 

             IG % (test code = IG%) 0.7 %        0.0-0.8                   

 

             NRBC% (test code = NRBC%) 0.0 %        0.0-0.2                   

 

             MANDIFF (test code = MDIFF) NO           NO                        

 

             RBC MORPH (test code = RBCMOR)              NORMAL                 

   



URINALYSIS WITH [id number] 06:44:00





             Test Item    Value        Reference Range Interpretation Comments

 

             COLOR (test code = COLU) YELLOW       YELLOW                    

 

             CLARITY (test code = CLA) CLOUDY       CLEAR        A            

 

             GLUCOSE UR (test code = UA NEGATIVE     NEGATIVE                  



             GLUCOSE)                                            

 

             BILI UR (test code = BILE) NEGATIVE     NEGATIVE                  

 

             KETONES UR (test code = ACE) NEGATIVE     NEGATIVE                 

 

 

             SP GRAVITY (test code = SPGR) 1.014        1.005-1.030             

  

 

             PH UR (test code = PH) 6.0          4.5-8.0                   

 

             PROTEIN UR (test code = PU) TRACE        NEGATIVE     A            

 

             UROBIL UR (test code = UROQ) 0.2 EU/dL    0.2-1.0                  

 

 

             NITRITE UR (test code = NEGATIVE     NEGATIVE                  



             NITRITE)                                            

 

             BLOOD UR (test code = UA BLOOD) TRACE        NEGATIVE     A        

    

 

             LEUK ES UR (test code = LEUK) 2+           NEGATIVE     A          

  

 

             WBC UR (test code = UWBC) 12 /HPF      0-5          H            

 

             RBC UR (test code = URBC) 1 /HPF       0-2                       

 

             EPITH UR (test code = UEPC) FEW /LPF     FEW                       

 

             BACTERIA UR (test code = UBACT) MODERATE /HPF NONE         A       

     

 

             CAST UR (test code = CAST)  /LPF        NONE                      

 

             CRYSTAL UR (test code = CRYU)  / LPF       NONE                    

  

 

             MUCUS UR (test code = MUC)  / HPF       NONE                      

 

             AMORPH UR (test code = YASMEEN)  / HPF       NONE                     

 

 

             TRICH UR (test code = UTRICH)  /HPF        NONE                    

  

 

             YEAST UR (test code = UY)  /HPF        NONE                      

 

             SPERM UR (test code = USPERM)  /HPF        NONE                    

  



COMPREHENSIVE METABOLIC [id number] 05:24:00





             Test Item    Value        Reference Range Interpretation Comments

 

             GLUCOSE (test code = 06D) 105 mg/dL           H            

 

             SODIUM (test code = 01A) 138 mmol/L   136-145                   

 

             POTASSIUM (test code = 01B) 4.0 mmol/L   3.6-5.1                   

 

             CHLORIDE (test code = 04A) 104 mmol/L                       

 

             CO2 (test code = 02A) 25 mmol/L    22-32                     

 

             ANION GAP (test code = ANG) 13.0 mmol/L                            

 

             BUN (test code = 05D) 29 mg/dL     7-18         H            

 

             CREATININE (test code = 03E) 1.5 mg/dL    0.4-1.1      H           

 

 

             BUN/CREA (test code = BCR) 19           12-20                     

 

             CALCIUM (test code = 09D) 8.4 mg/dL    8.3-9.5                   

 

             BILI TOTAL (test code = 11A) 0.7 mg/dL    0.2-1.0                  

 

 

             PROTEIN (test code = 07D) 6.2 g/dL     6.4-8.2      L            

 

             ALBUMIN (test code = 08D) 3.1 g/dL     3.5-4.8      L            

 

             GLOBULIN (test code = GLB) 3.1 g/dL     1.5-3.8                   

 

             ALB/GLOB (test code = AGRR) 1.0          1.0-2.6                   

 

             ALK PHOS (test code = 35A) 104 IU/L                         

 

             AST (test code = 30A) 22 IU/L      <=42                      

 

             ALT (test code = 31A) 42 IU/L      <=78                      



CBC (INCLUDES AUTOMATED DIFFERENTIAL)2019 05:07:00





             Test Item    Value        Reference Range Interpretation Comments

 

             WBC (test code = WBC) 8.6 10\S\3/uL 4.5-11.0                  

 

             RBC (test code = RBC) 3.72 10\S\6/uL 4.20-5.60    L            

 

             HGB (test code = HBG) 10.8 g/dL    12.0-15.5    L            

 

             HCT (test code = HCT) 33.7 %       35.0-44.0    L            

 

             MCV (test code = MCV) 90.6 fL      81.0-99.0                 

 

             MCH (test code = MCH) 29.0 pg      27.0-31.0                 

 

             MCHC (test code = MCHC) 32.0 g/dL    32.0-36.0                 

 

             RDW (test code = RDW) 14.7 %       11.5-14.5    H            

 

             PLT (test code = PLT) 152 10\S\3/uL 130-400                   

 

             MPV (test code = MPV) 11.4 fL      9.4-12.4                  

 

             NEUTROP # (test code = NE#) 6.2 10\S\3/uL 1.6-8.0                  

 

 

             LYMPH # (test code = LY#) 1.6 10\S\3/uL 1.1-3.5                   

 

             MONOCYTE # (test code = MO#) 0.5 10\S\3/uL 0.0-1.1                 

  

 

             EOSINOPH # (test code = EO#) 0.2 10\S\3/uL 0.0-0.7                 

  

 

             BASOPHIL # (test code = BA#) 0.1 10\S\3/uL 0.0-0.3                 

  

 

             IG # (test code = IG#) 0.03 10\S\3/uL 0.00-0.06                 

 

             NRBC # (test code = NRBC#) 0.00 10\S\3/uL 0.00-0.01                

 

 

             NEUTROPH % (test code = NE%) 72.8 %       35.0-73.0                

 

 

             LYMPH % (test code = LY%) 18.5 %       20.0-55.0    L            

 

             MONO % (test code = MO%) 5.8 %        2.5-10.0                  

 

             EOSINOPH % (test code = EO%) 2.0 %        0.0-5.0                  

 

 

             BASOPHIL % (test code = BA%) 0.6 %        0.0-2.0                  

 

 

             IG % (test code = IG%) 0.3 %        0.0-0.8                   

 

             NRBC% (test code = NRBC%) 0.0 %        0.0-0.2                   

 

             MANDIFF (test code = MDIFF) NO           NO                        

 

             RBC MORPH (test code = RBCMOR)              NORMAL                 

   



U/S KIDNEY (RENAL)2019 23:20:42LOCATION: [id number]: 63-year-old female 
who presents with acute nontraumatic kidney injury.COMMENT:Sonographic imaging 
of this patient's retroperitoneum was performed.The right kidney measures 9.9 x 
5.9 x 6.4 cm with a 13 mm cortical thickness. Acyst is seen in the upper pole 
measuring 23 x 19 x 19 mm, and a second cyst isseen in the lower pole measuring 
18 x 16 x 16 mm.The left kidney measures 9.4 x 5.3 x 5.6 cm with a 11 mm 
cortical thickness. Nocysts or masses are seen in the left kidney.Cortical 
echotexture of the kidneys otherwise is unremarkable.There is no evidence of 
hydronephrosis of either kidney.In the urinary bladder only the left urine jet 
was observed on the color flowstudy. The bladder otherwise is 
unremarkable.IMPRESSION:Cystic changes are seen in the upper pole and lower pole
ofthis patient'sright kidney. No gross abnormalities are seen elsewhere in 
either kidney.The urinary bladder is unremarkable. Only the left urine jet was 
observed onthe color flow study.TROPONIN [id number] 07:14:00





             Test Item    Value        Reference Range Interpretation Comments

 

             TROPONIN I (test code = A84) <0.015 ng/mL 0.000-0.045              

 



COMPREHENSIVE METABOLIC [id number] 05:28:00





             Test Item    Value        Reference Range Interpretation Comments

 

             GLUCOSE (test code = 06D) 110 mg/dL           H            

 

             SODIUM (test code = 01A) 138 mmol/L   136-145                   

 

             POTASSIUM (test code = 01B) 3.9 mmol/L   3.6-5.1                   

 

             CHLORIDE (test code = 04A) 106 mmol/L                       

 

             CO2 (test code = 02A) 24 mmol/L    22-32                     

 

             ANION GAP (test code = ANG) 11.9 mmol/L                            

 

             BUN (test code = 05D) 22 mg/dL     7-18         H            

 

             CREATININE (test code = 03E) 1.5 mg/dL    0.4-1.1      H           

 

 

             BUN/CREA (test code = BCR) 15           12-20                     

 

             CALCIUM (test code = 09D) 8.2 mg/dL    8.3-9.5      L            

 

             BILI TOTAL (test code = 11A) 0.6 mg/dL    0.2-1.0                  

 

 

             PROTEIN (test code = 07D) 6.0 g/dL     6.4-8.2      L            

 

             ALBUMIN (test code = 08D) 2.9 g/dL     3.5-4.8      L            

 

             GLOBULIN (test code = GLB) 3.1 g/dL     1.5-3.8                   

 

             ALB/GLOB (test code = AGRR) 0.9          1.0-2.6      L            

 

             ALK PHOS (test code = 35A) 96 IU/L                          

 

             AST (test code = 30A) 19 IU/L      <=42                      

 

             ALT (test code = 31A) 35 IU/L      <=78                      



CBC (INCLUDES AUTOMATED DIFFERENTIAL)2019 05:14:00





             Test Item    Value        Reference Range Interpretation Comments

 

             WBC (test code = WBC) 7.0 10\S\3/uL 4.5-11.0                  

 

             RBC (test code = RBC) 3.64 10\S\6/uL 4.20-5.60    L            

 

             HGB (test code = HBG) 10.6 g/dL    12.0-15.5    L            

 

             HCT (test code = HCT) 33.5 %       35.0-44.0    L            

 

             MCV (test code = MCV) 92.0 fL      81.0-99.0                 

 

             MCH (test code = MCH) 29.1 pg      27.0-31.0                 

 

             MCHC (test code = MCHC) 31.6 g/dL    32.0-36.0    L            

 

             RDW (test code = RDW) 14.9 %       11.5-14.5    H            

 

             PLT (test code = PLT) 145 10\S\3/uL 130-400                   

 

             MPV (test code = MPV) 11.4 fL      9.4-12.4                  

 

             NEUTROP # (test code = NE#) 4.2 10\S\3/uL 1.6-8.0                  

 

 

             LYMPH # (test code = LY#) 2.1 10\S\3/uL 1.1-3.5                   

 

             MONOCYTE # (test code = MO#) 0.5 10\S\3/uL 0.0-1.1                 

  

 

             EOSINOPH # (test code = EO#) 0.1 10\S\3/uL 0.0-0.7                 

  

 

             BASOPHIL # (test code = BA#) 0.0 10\S\3/uL 0.0-0.3                 

  

 

             IG # (test code = IG#) 0.04 10\S\3/uL 0.00-0.06                 

 

             NRBC # (test code = NRBC#) 0.00 10\S\3/uL 0.00-0.01                

 

 

             NEUTROPH % (test code = NE%) 59.7 %       35.0-73.0                

 

 

             LYMPH % (test code = LY%) 30.1 %       20.0-55.0                 

 

             MONO % (test code = MO%) 7.0 %        2.5-10.0                  

 

             EOSINOPH % (test code = EO%) 2.0 %        0.0-5.0                  

 

 

             BASOPHIL % (test code = BA%) 0.6 %        0.0-2.0                  

 

 

             IG % (test code = IG%) 0.6 %        0.0-0.8                   

 

             NRBC% (test code = NRBC%) 0.0 %        0.0-0.2                   

 

             MANDIFF (test code = MDIFF) NO           NO                        

 

             RBC MORPH (test code = RBCMOR)              NORMAL                 

   



COMPREHENSIVE METABOLIC PAN2019-08-15 04:58:00





             Test Item    Value        Reference Range Interpretation Comments

 

             GLUCOSE (test code = 06D) 112 mg/dL           H            

 

             SODIUM (test code = 01A) 143 mmol/L   136-145                   

 

             POTASSIUM (test code = 01B) 4.4 mmol/L   3.6-5.1                   

 

             CHLORIDE (test code = 04A) 108 mmol/L          H            

 

             CO2 (test code = 02A) 27 mmol/L    22-32                     

 

             ANION GAP (test code = ANG) 12.4 mmol/L                            

 

             BUN (test code = 05D) 19 mg/dL     7-18         H            

 

             CREATININE (test code = 03E) 1.4 mg/dL    0.4-1.1      H           

 

 

             BUN/CREA (test code = BCR) 14           12-20                     

 

             CALCIUM (test code = 09D) 8.5 mg/dL    8.3-9.5                   

 

             BILI TOTAL (test code = 11A) 0.9 mg/dL    0.2-1.0                  

 

 

             PROTEIN (test code = 07D) 6.2 g/dL     6.4-8.2      L            

 

             ALBUMIN (test code = 08D) 2.9 g/dL     3.5-4.8      L            

 

             GLOBULIN (test code = GLB) 3.3 g/dL     1.5-3.8                   

 

             ALB/GLOB (test code = AGRR) 0.9          1.0-2.6      L            

 

             ALK PHOS (test code = 35A) 95 IU/L                          

 

             AST (test code = 30A) 19 IU/L      <=42                      

 

             ALT (test code = 31A) 40 IU/L      <=78                      



CBC (INCLUDES AUTOMATED DIFFERENTIAL)2019-08-15 04:51:00





             Test Item    Value        Reference Range Interpretation Comments

 

             WBC (test code = WBC) 8.2 10\S\3/uL 4.5-11.0                  

 

             RBC (test code = RBC) 3.48 10\S\6/uL 4.20-5.60    L            

 

             HGB (test code = HBG) 10.3 g/dL    12.0-15.5    L            

 

             HCT (test code = HCT) 32.4 %       35.0-44.0    L            

 

             MCV (test code = MCV) 93.1 fL      81.0-99.0                 

 

             MCH (test code = MCH) 29.6 pg      27.0-31.0                 

 

             MCHC (test code = MCHC) 31.8 g/dL    32.0-36.0    L            

 

             RDW (test code = RDW) 15.5 %       11.5-14.5    H            

 

             PLT (test code = PLT) 163 10\S\3/uL 130-400                   

 

             MPV (test code = MPV) 11.8 fL      9.4-12.4                  

 

             NEUTROP # (test code = NE#) 5.3 10\S\3/uL 1.6-8.0                  

 

 

             LYMPH # (test code = LY#) 2.0 10\S\3/uL 1.1-3.5                   

 

             MONOCYTE # (test code = MO#) 0.6 10\S\3/uL 0.0-1.1                 

  

 

             EOSINOPH # (test code = EO#) 0.2 10\S\3/uL 0.0-0.7                 

  

 

             BASOPHIL # (test code = BA#) 0.0 10\S\3/uL 0.0-0.3                 

  

 

             IG # (test code = IG#) 0.03 10\S\3/uL 0.00-0.06                 

 

             NRBC # (test code = NRBC#) 0.00 10\S\3/uL 0.00-0.01                

 

 

             NEUTROPH % (test code = NE%) 65.5 %       35.0-73.0                

 

 

             LYMPH % (test code = LY%) 24.2 %       20.0-55.0                 

 

             MONO % (test code = MO%) 7.2 %        2.5-10.0                  

 

             EOSINOPH % (test code = EO%) 2.2 %        0.0-5.0                  

 

 

             BASOPHIL % (test code = BA%) 0.5 %        0.0-2.0                  

 

 

             IG % (test code = IG%) 0.4 %        0.0-0.8                   

 

             NRBC% (test code = NRBC%) 0.0 %        0.0-0.2                   

 

             MANDIFF (test code = MDIFF) NO           NO                        



CARDIAC [id number] 23:10:00





             Test Item    Value        Reference Range Interpretation Comments

 

             TROPONIN I (test code = A84) <0.015 ng/mL 0.000-0.045              

 



U/S VENOUS DOPPLER IVON LOW [id number] 22:23:14LOCATION: [id number]: 
63-year-old female who presents with bilateral leg swelling.COMMENT: Sonographic
imaging of the venous anatomy in both of this patient's legs wasobtained 
utilizing grayscale, color-flow, and Doppler waveform imagingmodalities.The 
common femoral veins, superficial femoral veins, popliteal veins, 
posteriortibial veins, anterior tibial veins, and peroneal veins in both legs 
wereincluded in the study.The anatomy exhibits normal compressibility on 
grayscale study. No suspiciousfilling defects are seen on the color flow study. 
Appropriate waveform responseas are noted on the Dopplerstudy during 
augmentation maneuvers and duringquiet respiration. IMPRESSION:There is no 
sonographic evidence of venous thrombosis in either of thispatient's legs.
CARDIAC [id number] 16:50:00





             Test Item    Value        Reference Range Interpretation Comments

 

             TROPONIN I (test code = A84) <0.015 ng/mL 0.000-0.045              

 



BRAIN NATRIURETIC [id number] 14:39:00





             Test Item    Value        Reference Range Interpretation Comments

 

             proBNP (test code = PBNP) 1337 pg/mL   0-125        H            



THYROID PANEL/SCREEN (TSH)2019 12:05:00





             Test Item    Value        Reference Range Interpretation Comments

 

             TSH (test code = A57) 0.989 uIU/mL 0.358-3.740               



[id number] 12:02:00





             Test Item    Value        Reference Range Interpretation Comments

 

             CPK (test code = 32A) 98 IU/L                          



AMYLASE AND [id number] 12:02:00





             Test Item    Value        Reference Range Interpretation Comments

 

             AMYLASE (test code = 10A) 19 U/L              L            

 

             LIPASE (test code = 60A) 67 IU/L             L            



COMPREHENSIVE METABOLIC [id number] 12:02:00





             Test Item    Value        Reference Range Interpretation Comments

 

             GLUCOSE (test code = 06D) 109 mg/dL           H            

 

             SODIUM (test code = 01A) 142 mmol/L   136-145                   

 

             POTASSIUM (test code = 01B) 4.2 mmol/L   3.6-5.1                   

 

             CHLORIDE (test code = 04A) 109 mmol/L          H            

 

             CO2 (test code = 02A) 26 mmol/L    22-32                     

 

             ANION GAP (test code = ANG) 11.2 mmol/L                            

 

             BUN (test code = 05D) 16 mg/dL     7-18                      

 

             CREATININE (test code = 03E) 1.3 mg/dL    0.4-1.1      H           

 

 

             BUN/CREA (test code = BCR) 13           12-20                     

 

             CALCIUM (test code = 09D) 8.7 mg/dL    8.3-9.5                   

 

             BILI TOTAL (test code = 11A) 1.3 mg/dL    0.2-1.0      H           

 

 

             PROTEIN (test code = 07D) 6.5 g/dL     6.4-8.2                   

 

             ALBUMIN (test code = 08D) 3.2 g/dL     3.5-4.8      L            

 

             GLOBULIN (test code = GLB) 3.3 g/dL     1.5-3.8                   

 

             ALB/GLOB (test code = AGRR) 1.0          1.0-2.6                   

 

             ALK PHOS (test code = 35A) 101 IU/L                         

 

             AST (test code = 30A) 21 IU/L      <=42                      

 

             ALT (test code = 31A) 41 IU/L      <=78                      



TROPONIN -[id number] 11:57:00





             Test Item    Value        Reference Range Interpretation Comments

 

             TROPONIN I (test code = A84) <0.015 ng/mL 0.000-0.045              

 



XR CHEST 1 VIEW [id number] 11:55:22EXAM: XR CHEST 1 VIEW 
PORTABLE.LOCATION: .HISTORY: [id number]: Chest pain.COMPARISON: Radiograph dated
2019.TECHNIQUE: Single AP view of the chest was obtained. FINDINGS:The 
heart is enlarged in size. Small left pleural effusion and left basilaropacities
are present. There is elevation of the right hemidiaphragm. No acuteosseous 
abnormality is identified.IMPRESSION:Small left pleural effusion with left 
basilar opacities, which may representatelectasis or infiltrates.Cardiomegaly.
PRO TIME AND [id number] 11:43:00





             Test Item    Value        Reference Range Interpretation Comments

 

             PT (test code = 13.4 s       9.8-13.6                  



             TT)                                                 

 

             INR (test code = 1.2                                    



             INR)                                                

 

             INRH (test code =  **** SUGGESTED THERAPEUTIC                      

     



             INRH)        RANGE FOR INR: **** 2.5 -                           



                          3.5 ** For Patients with                           



                          Prosthetic Valves or                           



                          Patients with recurrent                           



                          Thromboembolic Events **                           



                          2.0 - 3.0 ** For Most Other                           



                          Applications **                           

 

             PTT (test code = 30.4 s       20.2-38.0                 



             PTT)                                                

 

             PTTH (test code = **** To monitor the                           



             PTTH)        effectiveness of heparin,                           



                          we offer the Anti-Xa                           



                          (Heparin Assay). It can be                           



                          used for either                           



                          unfractionated or LMW                           



                          Heparin. Order Code is                           



                          ANTI-XA ****                           



CBC (INCLUDES AUTOMATED DIFFERENTIAL)2019 11:36:00





             Test Item    Value        Reference Range Interpretation Comments

 

             WBC (test code = WBC) 7.6 10\S\3/uL 4.5-11.0                  

 

             RBC (test code = RBC) 3.53 10\S\6/uL 4.20-5.60    L            

 

             HGB (test code = HBG) 10.3 g/dL    12.0-15.5    L            

 

             HCT (test code = HCT) 33.5 %       35.0-44.0    L            

 

             MCV (test code = MCV) 94.9 fL      81.0-99.0                 

 

             MCH (test code = MCH) 29.2 pg      27.0-31.0                 

 

             MCHC (test code = MCHC) 30.7 g/dL    32.0-36.0    L            

 

             RDW (test code = RDW) 15.4 %       11.5-14.5    H            

 

             PLT (test code = PLT) 164 10\S\3/uL 130-400                   

 

             MPV (test code = MPV) 11.7 fL      9.4-12.4                  

 

             NEUTROP # (test code = NE#) 5.6 10\S\3/uL 1.6-8.0                  

 

 

             LYMPH # (test code = LY#) 1.4 10\S\3/uL 1.1-3.5                   

 

             MONOCYTE # (test code = MO#) 0.4 10\S\3/uL 0.0-1.1                 

  

 

             EOSINOPH # (test code = EO#) 0.1 10\S\3/uL 0.0-0.7                 

  

 

             BASOPHIL # (test code = BA#) 0.0 10\S\3/uL 0.0-0.3                 

  

 

             IG # (test code = IG#) 0.04 10\S\3/uL 0.00-0.06                 

 

             NRBC # (test code = NRBC#) 0.00 10\S\3/uL 0.00-0.01                

 

 

             NEUTROPH % (test code = NE%) 73.9 %       35.0-73.0    H           

 

 

             LYMPH % (test code = LY%) 18.0 %       20.0-55.0    L            

 

             MONO % (test code = MO%) 5.7 %        2.5-10.0                  

 

             EOSINOPH % (test code = EO%) 1.6 %        0.0-5.0                  

 

 

             BASOPHIL % (test code = BA%) 0.3 %        0.0-2.0                  

 

 

             IG % (test code = IG%) 0.5 %        0.0-0.8                   

 

             NRBC% (test code = NRBC%) 0.0 %        0.0-0.2                   

 

             MANDIFF (test code = MDIFF) NO           NO                        

 

             RBC MORPH (test code = RBCMOR)              NORMAL                 

   



CBC WITH [id number] 15:29:00





             Test Item    Value        Reference Range Interpretation Comments

 

             WBC (test code = WBC) 9.7 10\S\3/uL 4.5-11.0                  

 

             RBC (test code = RBC) 3.73 10\S\6/uL 4.20-5.60    L            

 

             HGB (test code = HBG) 11.0 g/dL    12.0-15.5    L            

 

             HCT (test code = HCT) 34.1 %       35.0-44.0    L            

 

             MCV (test code = MCV) 91.4 fL      81.0-99.0                 

 

             MCH (test code = MCH) 29.5 pg      27.0-31.0                 

 

             MCHC (test code = MCHC) 32.3 g/dL    32.0-36.0                 

 

             RDW (test code = RDW) 14.2 %       11.5-14.5                 

 

             PLT (test code = PLT) 116 10\S\3/uL 130-400      L            

 

             MPV (test code = MPV) 11.8 fL      9.4-12.4                  

 

             NEUTROP # (test code = NE#) 7.9 10\S\3/uL 1.6-8.0                  

 

 

             LYMPH # (test code = LY#) 1.0 10\S\3/uL 1.1-3.5      L            

 

             MONOCYTE # (test code = 0.6 10\S\3/uL 0.0-1.1                   



             MO#)                                                

 

             EOSINOPH # (test code = 0.1 10\S\3/uL 0.0-0.7                   



             EO#)                                                

 

             BASOPHIL # (test code = 0.0 10\S\3/uL 0.0-0.3                   



             BA#)                                                

 

             IG # (test code = IG#) 0.04 10\S\3/uL 0.00-0.06                 

 

             NRBC # (test code = NRBC#) 0.00 10\S\3/uL 0.00-0.01                

 

 

             NEUTROPH % (test code = 81.6 %       35.0-73.0    H            



             NE%)                                                

 

             LYMPH % (test code = LY%) 10.3 %       20.0-55.0    L            

 

             MONO % (test code = MO%) 6.4 %        2.5-10.0                  

 

             EOSINOPH % (test code = 1.0 %        0.0-5.0                   



             EO%)                                                

 

             BASOPHIL % (test code = 0.3 %        0.0-2.0                   



             BA%)                                                

 

             IG % (test code = IG%) 0.4 %        0.0-0.8                   

 

             NRBC% (test code = NRBC%) 0.0 %        0.0-0.2                   

 

             PLT EST (test code = ADEQUATE     ADEQUATE                  



             PLTEST)                                             

 

             PLT MORPH (test code = NORMAL (1.5-3 um) NORMAL                    



             PLTMOR)                                             



BASIC METABOLIC [id number] 15:26:00





             Test Item    Value        Reference Range Interpretation Comments

 

             GLUCOSE (test code = 06D) 118 mg/dL           H            

 

             SODIUM (test code = 01A) 136 mmol/L   136-145                   

 

             POTASSIUM (test code = 01B) 4.2 mmol/L   3.6-5.1                   

 

             CHLORIDE (test code = 04A) 106 mmol/L                       

 

             CO2 (test code = 02A) 24 mmol/L    22-32                     

 

             ANION GAP (test code = ANG) 10.2 mmol/L                            

 

             BUN (test code = 05D) 38 mg/dL     7-18         H            

 

             CREATININE (test code = 03E) 1.6 mg/dL    0.4-1.1      H           

 

 

             BUN/CREA (test code = BCR) 23           12-20        H            

 

             CALCIUM (test code = 09D) 9.2 mg/dL    8.3-9.5                   



CARDIAC [id number] 06:04:00





             Test Item    Value        Reference Range Interpretation Comments

 

             TROPONIN I (test code = A84) <0.015 ng/mL 0.000-0.045              

 



BASIC METABOLIC [id number] 05:52:00





             Test Item    Value        Reference Range Interpretation Comments

 

             GLUCOSE (test code = 06D) 171 mg/dL           H            

 

             SODIUM (test code = 01A) 138 mmol/L   136-145                   

 

             POTASSIUM (test code = 01B) 4.0 mmol/L   3.6-5.1                   

 

             CHLORIDE (test code = 04A) 107 mmol/L                       

 

             CO2 (test code = 02A) 23 mmol/L    22-32                     

 

             ANION GAP (test code = ANG) 12.0 mmol/L                            

 

             BUN (test code = 05D) 19 mg/dL     7-18         H            

 

             CREATININE (test code = 03E) 1.2 mg/dL    0.4-1.1      H           

 

 

             BUN/CREA (test code = BCR) 15           12-20                     

 

             CALCIUM (test code = 09D) 8.5 mg/dL    8.3-9.5                   



CBC (INCLUDES AUTOMATED DIFFERENTIAL)2019 05:30:00





             Test Item    Value        Reference Range Interpretation Comments

 

             WBC (test code = WBC) 14.0 10\S\3/uL 4.5-11.0     H            

 

             RBC (test code = RBC) 3.64 10\S\6/uL 4.20-5.60    L            

 

             HGB (test code = HBG) 10.8 g/dL    12.0-15.5    L            

 

             HCT (test code = HCT) 33.2 %       35.0-44.0    L            

 

             MCV (test code = MCV) 91.2 fL      81.0-99.0                 

 

             MCH (test code = MCH) 29.7 pg      27.0-31.0                 

 

             MCHC (test code = MCHC) 32.5 g/dL    32.0-36.0                 

 

             RDW (test code = RDW) 14.0 %       11.5-14.5                 

 

             PLT (test code = PLT) 143 10\S\3/uL 130-400                   

 

             MPV (test code = MPV) 11.4 fL      9.4-12.4                  

 

             NEUTROP # (test code = NE#) 12.1 10\S\3/uL 1.6-8.0      H          

  

 

             LYMPH # (test code = LY#) 0.9 10\S\3/uL 1.1-3.5      L            

 

             MONOCYTE # (test code = MO#) 0.9 10\S\3/uL 0.0-1.1                 

  

 

             EOSINOPH # (test code = EO#) 0.0 10\S\3/uL 0.0-0.7                 

  

 

             BASOPHIL # (test code = BA#) 0.0 10\S\3/uL 0.0-0.3                 

  

 

             IG # (test code = IG#) 0.10 10\S\3/uL 0.00-0.06    H            

 

             NRBC # (test code = NRBC#) 0.00 10\S\3/uL 0.00-0.01                

 

 

             NEUTROPH % (test code = NE%) 86.2 %       35.0-73.0    H           

 

 

             LYMPH % (test code = LY%) 6.3 %        20.0-55.0    L            

 

             MONO % (test code = MO%) 6.6 %        2.5-10.0                  

 

             EOSINOPH % (test code = EO%) 0.0 %        0.0-5.0                  

 

 

             BASOPHIL % (test code = BA%) 0.2 %        0.0-2.0                  

 

 

             IG % (test code = IG%) 0.7 %        0.0-0.8                   

 

             NRBC% (test code = NRBC%) 0.0 %        0.0-0.2                   

 

             MANDIFF (test code = MDIFF) NO           NO                        

 

             RBC MORPH (test code = RBCMOR)              NORMAL                 

   



CARDIAC [id number] 23:08:00





             Test Item    Value        Reference Range Interpretation Comments

 

             TROPONIN I (test code = A84) <0.015 ng/mL 0.000-0.045              

 



CT DISSECTION [id number] 19:26:35Exam: CT thorax PE protocol.Location: H
12History: [id number]: Chest painTechnique: Enhanced spiral slices were taken from
the apices of the lungs,through the upper abdomen utilizing a pulmonary embolism
protocol. Multiplanarreformations were performed. 100cc of Omnipaque were used. 
One or more of thefollowing radiation dose reduction techniques was used: 
automated exposurecontrol, adjustment of mA and/or KV according to patient size,
and/orutilization of iterative reconstruction technique.Findings:Nofilling 
defects are seen in the main pulmonary arteries. The pulmonaryvasculature is 
normal. No pulmonary venous congestion or arterial hypertensionis seen.The lungs
are clear. No infiltration or effusion is seen. No mass or nodule isseen.The 
mediastinum and the pulmonary kandis are normal. Calcified granulomas arenoted. No
lymphadenopathy is present. The heart size is normal.No pericardialeffusion is s
een.The visualized upper abdominal organs are unremarkable.Impression:1. 
Negative for pulmonary embolism.2. No acute disease.THYROID PANEL/SCREEN (TSH)
2019 18:29:00





             Test Item    Value        Reference Range Interpretation Comments

 

             TSH (test code = A57) 0.706 uIU/mL 0.358-3.740               



LIVER [id number] 18:28:00





             Test Item    Value        Reference Range Interpretation Comments

 

             BILI TOTAL (test code = 11A) 0.9 mg/dL    0.2-1.0                  

 

 

             BILI DIRCT (test code = 12A) 0.2 mg/dL    0.0-0.2                  

 

 

             BILI INDIR (test code = BILII) 0.7 mg/dL    <=0.8                  

   

 

             PROTEIN (test code = 07D) 7.7 g/dL     6.4-8.2                   

 

             ALBUMIN (test code = 08D) 3.8 g/dL     3.5-4.8                   

 

             GLOBULIN (test code = GLB) 3.9 g/dL     1.5-3.8      H            

 

             ALB/GLOB (test code = AGRR) 1.0          1.0-2.6                   

 

             ALK PHOS (test code = 35A) 106 IU/L                         

 

             AST (test code = 30A) 25 IU/L      <=42                      

 

             ALT (test code = 31A) 21 IU/L      <=78                      



BRAIN NATRIURETIC [id number] 18:19:00





             Test Item    Value        Reference Range Interpretation Comments

 

             proBNP (test code = PBNP) 518 pg/mL    0-125        H            



[id number] 18:15:00





             Test Item    Value        Reference Range Interpretation Comments

 

             MAGNESIUM (test code = 48A) 1.9 mg/dL    1.8-2.4                   



[id number] 17:40:00





             Test Item    Value        Reference Range Interpretation Comments

 

             D-DIMER (test code = <200 ng/mL D-DU 0-234                     



             DDI)                                                

 

             D-DIMER COMMENT (test *Level to rule out                           



             code = DDCOM) DVT or PE: <235 ng/mL                           



                          D-DU*                                  



CARDIAC [id number] 17:31:00





             Test Item    Value        Reference Range Interpretation Comments

 

             TROPONIN I (test code = A84) <0.015 ng/mL 0.000-0.045              

 



BASIC METABOLIC [id number] 17:27:00





             Test Item    Value        Reference Range Interpretation Comments

 

             GLUCOSE (test code = 06D) 114 mg/dL           H            

 

             SODIUM (test code = 01A) 142 mmol/L   136-145                   

 

             POTASSIUM (test code = 01B) 4.0 mmol/L   3.6-5.1                   

 

             CHLORIDE (test code = 04A) 111 mmol/L          H            

 

             CO2 (test code = 02A) 26 mmol/L    22-32                     

 

             ANION GAP (test code = ANG) 9.0 mmol/L                             

 

             BUN (test code = 05D) 19 mg/dL     7-18         H            

 

             CREATININE (test code = 03E) 1.2 mg/dL    0.4-1.1      H           

 

 

             BUN/CREA (test code = BCR) 15           12-20                     

 

             CALCIUM (test code = 09D) 9.2 mg/dL    8.3-9.5                   



CBC (INCLUDES AUTOMATED DIFFERENTIAL)2019 17:26:00





             Test Item    Value        Reference Range Interpretation Comments

 

             WBC (test code = WBC) 12.6 10\S\3/uL 4.5-11.0     H            

 

             RBC (test code = RBC) 4.04 10\S\6/uL 4.20-5.60    L            

 

             HGB (test code = HBG) 11.9 g/dL    12.0-15.5    L            

 

             HCT (test code = HCT) 37.6 %       35.0-44.0                 

 

             MCV (test code = MCV) 93.1 fL      81.0-99.0                 

 

             MCH (test code = MCH) 29.5 pg      27.0-31.0                 

 

             MCHC (test code = MCHC) 31.6 g/dL    32.0-36.0    L            

 

             RDW (test code = RDW) 13.8 %       11.5-14.5                 

 

             PLT (test code = PLT) 152 10\S\3/uL 130-400                   

 

             MPV (test code = MPV) 11.3 fL      9.4-12.4                  

 

             NEUTROP # (test code = NE#) 10.7 10\S\3/uL 1.6-8.0      H          

  

 

             LYMPH # (test code = LY#) 1.1 10\S\3/uL 1.1-3.5                   

 

             MONOCYTE # (test code = MO#) 0.6 10\S\3/uL 0.0-1.1                 

  

 

             EOSINOPH # (test code = EO#) 0.1 10\S\3/uL 0.0-0.7                 

  

 

             BASOPHIL # (test code = BA#) 0.0 10\S\3/uL 0.0-0.3                 

  

 

             IG # (test code = IG#) 0.06 10\S\3/uL 0.00-0.06                 

 

             NRBC # (test code = NRBC#) 0.00 10\S\3/uL 0.00-0.01                

 

 

             NEUTROPH % (test code = NE%) 85.2 %       35.0-73.0    H           

 

 

             LYMPH % (test code = LY%) 8.4 %        20.0-55.0    L            

 

             MONO % (test code = MO%) 5.0 %        2.5-10.0                  

 

             EOSINOPH % (test code = EO%) 0.6 %        0.0-5.0                  

 

 

             BASOPHIL % (test code = BA%) 0.3 %        0.0-2.0                  

 

 

             IG % (test code = IG%) 0.5 %        0.0-0.8                   

 

             NRBC% (test code = NRBC%) 0.0 %        0.0-0.2                   

 

             MANDIFF (test code = MDIFF) NO           NO                        



PTT (PARTIAL THROMBOPLASTIN TIME)2019 17:26:00





             Test Item    Value        Reference Range Interpretation Comments

 

             PTT (test code = 31.9 s       20.2-38.0                 



             PTT)                                                

 

             PTTH (test code = **** To monitor the                           



             PTTH)        effectiveness of heparin,                           



                          we offer the Anti-Xa                           



                          (Heparin Assay). It can be                           



                          used for either                           



                          unfractionated or LMW                           



                          Heparin. Order Code is                           



                          ANTI-XA ****                           



PROTHROMBIN [id number] 17:26:00





             Test Item    Value        Reference Range Interpretation Comments

 

             PT (test code = 12.0 s       9.8-13.6                  



             TT)                                                 

 

             INR (test code = 1.1                                    



             INR)                                                

 

             INRH (test code =  **** SUGGESTED THERAPEUTIC                      

     



             INRH)        RANGE FOR INR: **** 2.5 -                           



                          3.5 ** For Patients with                           



                          Prosthetic Valves or                           



                          Patients with recurrent                           



                          Thromboembolic Events **                           



                          2.0 - 3.0 ** For Most Other                           



                          Applications **                           



XR CHEST 1 VIEW [id number] 17:04:28Portable AP chest, 1 viewLocation 
Code: [id number] HISTORY: Chest painCOMPARISON: NoneCOMMENT: Mild atelectasis is
present within the lung bases. The costophrenic angles aresharp. The 
cardiomediastinalsilhouette is unremarkable. The bones are intact.IMPRESSION: 
Mild bibasilar atelectasis. Otherwise, no acute abnormalityCHEM [id number] 
16:51:00





             Test Item    Value        Reference Range Interpretation Comments

 

             Creatinine Lvl (test code = Creatinine 1.15         0.50-1.40      

           



             Lvl)                                                



[facility]2017-01-24 16:51:00





             Test Item    Value        Reference Range Interpretation Comments

 

             BUN (test code = BUN) 16           7-                      



[facility]2017-01-24 16:51:00





             Test Item    Value        Reference Range Interpretation Comments

 

             Chloride Lvl (test code = Chloride Lvl) 109                  

            



[facility]2017-01-24 16:51:00





             Test Item    Value        Reference Range Interpretation Comments

 

             Potassium Lvl (test code = Potassium 4.3          3.5-5.1          

         



             Lvl)                                                



[facility]2017-01-24 16:51:00





             Test Item    Value        Reference Range Interpretation Comments

 

             Sodium Lvl (test code = Sodium Lvl) 144          135-145           

        



[facility]2017-01-24 16:51:00





             Test Item    Value        Reference Range Interpretation Comments

 

             CO2 (test code = CO2)                      



[facility]2017-01-24 16:51:00





             Test Item    Value        Reference Range Interpretation Comments

 

             Calcium Lvl (test code = Calcium Lvl) 8.4          8.5-10.5        

          



[facility]2017-01-24 16:51:00





             Test Item    Value        Reference Range Interpretation Comments

 

             AGAP (test code = AGAP) 11.3         10.0-20.0                 



[facility]2017-01-24 16:51:00





             Test Item    Value        Reference Range Interpretation Comments

 

             Glucose Lvl (test code = Glucose Lvl) 109          70-99           

          



[facility]2017-01-24 16:51:00





             Test Item    Value        Reference Range Interpretation Comments

 

             eGFR (test code = eGFR) 52                                     



[facility]2017-01-24 16:51:00





             Test Item    Value        Reference Range Interpretation Comments

 

             Creatinine Lvl (test code = Creatinine 1.15         0.50-1.40      

           



             Lvl)                                                



[facility]2017-01-24 16:51:00





             Test Item    Value        Reference Range Interpretation Comments

 

             BUN (test code = BUN) 16           -                      



[facility]2017-01-24 16:51:00





             Test Item    Value        Reference Range Interpretation Comments

 

             Chloride Lvl (test code = Chloride Lvl) 109                  

            



[facility]2017-01-24 16:51:00





             Test Item    Value        Reference Range Interpretation Comments

 

             Potassium Lvl (test code = Potassium 4.3          3.5-5.1          

         



             Lvl)                                                



[facility]2017-01-24 16:51:00





             Test Item    Value        Reference Range Interpretation Comments

 

             Sodium Lvl (test code = Sodium Lvl) 144          135-145           

        



[facility]2017-01-24 16:51:00





             Test Item    Value        Reference Range Interpretation Comments

 

             CO2 (test code = CO2)                      



[facility]2017-01-24 16:51:00





             Test Item    Value        Reference Range Interpretation Comments

 

             Calcium Lvl (test code = Calcium Lvl) 8.4          8.5-10.5        

          



[facility]2017-01-24 16:51:00





             Test Item    Value        Reference Range Interpretation Comments

 

             AGAP (test code = AGAP) 11.3         10.0-20.0                 



[facility]2017-01-24 16:51:00





             Test Item    Value        Reference Range Interpretation Comments

 

             Glucose Lvl (test code = Glucose Lvl) 109          70-99           

          



[facility]2017-01-24 16:51:00





             Test Item    Value        Reference Range Interpretation Comments

 

             eGFR (test code = eGFR) 52                                     



[facility]2017-01-24 16:51:00





             Test Item    Value        Reference Range Interpretation Comments

 

             Creatinine Lvl (test code = Creatinine 1.15         0.50-1.40      

           



             Lvl)                                                



[facility]2017-01-24 16:51:00





             Test Item    Value        Reference Range Interpretation Comments

 

             BUN (test code = BUN) 16           -                      



[facility]2017-01-24 16:51:00





             Test Item    Value        Reference Range Interpretation Comments

 

             Chloride Lvl (test code = Chloride Lvl) 109                  

            



[facility]2017-01-24 16:51:00





             Test Item    Value        Reference Range Interpretation Comments

 

             Potassium Lvl (test code = Potassium 4.3          3.5-5.1          

         



             Lvl)                                                



[facility]2017-01-24 16:51:00





             Test Item    Value        Reference Range Interpretation Comments

 

             Sodium Lvl (test code = Sodium Lvl) 144          135-145           

        



[facility]2017-01-24 16:51:00





             Test Item    Value        Reference Range Interpretation Comments

 

             CO2 (test code = CO2) 28           24-32                     



[facility]2017-01-24 16:51:00





             Test Item    Value        Reference Range Interpretation Comments

 

             Calcium Lvl (test code = Calcium Lvl) 8.4          8.5-10.5        

          



[facility]2017-01-24 16:51:00





             Test Item    Value        Reference Range Interpretation Comments

 

             AGAP (test code = AGAP) 11.3         10.0-20.0                 



[facility]2017-01-24 16:51:00





             Test Item    Value        Reference Range Interpretation Comments

 

             Glucose Lvl (test code = Glucose Lvl) 109          70-99           

          



[facility]2017-01-24 16:51:00





             Test Item    Value        Reference Range Interpretation Comments

 

             eGFR (test code = eGFR) 52                                     



[facility]2017-01-24 16:51:00





             Test Item    Value        Reference Range Interpretation Comments

 

             Creatinine Lvl (test code = Creatinine 1.15         0.50-1.40      

           



             Lvl)                                                



[facility]2017-01-24 16:51:00





             Test Item    Value        Reference Range Interpretation Comments

 

             BUN (test code = BUN) 16           -                      



[facility]2017-01-24 16:51:00





             Test Item    Value        Reference Range Interpretation Comments

 

             Chloride Lvl (test code = Chloride Lvl) 109                  

            



[facility]2017-01-24 16:51:00





             Test Item    Value        Reference Range Interpretation Comments

 

             Potassium Lvl (test code = Potassium 4.3          3.5-5.1          

         



             Lvl)                                                



[facility]2017-01-24 16:51:00





             Test Item    Value        Reference Range Interpretation Comments

 

             Sodium Lvl (test code = Sodium Lvl) 144          135-145           

        



[facility]2017-01-24 16:51:00





             Test Item    Value        Reference Range Interpretation Comments

 

             CO2 (test code = CO2) 28           -32                     



[facility]2017-01-24 16:51:00





             Test Item    Value        Reference Range Interpretation Comments

 

             Calcium Lvl (test code = Calcium Lvl) 8.4          8.5-10.5        

          



[facility]2017-01-24 16:51:00





             Test Item    Value        Reference Range Interpretation Comments

 

             AGAP (test code = AGAP) 11.3         10.0-20.0                 



[facility]2017-01-24 16:51:00





             Test Item    Value        Reference Range Interpretation Comments

 

             Glucose Lvl (test code = Glucose Lvl) 109          70-99           

          



[facility]2017-01-24 16:51:00





             Test Item    Value        Reference Range Interpretation Comments

 

             eGFR (test code = eGFR) 52                                     



[facility]2017-01-24 16:51:00





             Test Item    Value        Reference Range Interpretation Comments

 

             Creatinine Lvl (test code = Creatinine 1.15         0.50-1.40      

           



             Lvl)                                                



[facility]2017-01-24 16:51:00





             Test Item    Value        Reference Range Interpretation Comments

 

             BUN (test code = BUN) 16           -                      



[facility]2017-01-24 16:51:00





             Test Item    Value        Reference Range Interpretation Comments

 

             Chloride Lvl (test code = Chloride Lvl) 109                  

            



[facility]2017-01-24 16:51:00





             Test Item    Value        Reference Range Interpretation Comments

 

             Potassium Lvl (test code = Potassium 4.3          3.5-5.1          

         



             Lvl)                                                



[facility]2017-01-24 16:51:00





             Test Item    Value        Reference Range Interpretation Comments

 

             Sodium Lvl (test code = Sodium Lvl) 144          135-145           

        



[facility]2017-01-24 16:51:00





             Test Item    Value        Reference Range Interpretation Comments

 

             CO2 (test code = CO2) 28           -32                     



[facility]2017-01-24 16:51:00





             Test Item    Value        Reference Range Interpretation Comments

 

             Calcium Lvl (test code = Calcium Lvl) 8.4          8.5-10.5        

          



[facility]2017-01-24 16:51:00





             Test Item    Value        Reference Range Interpretation Comments

 

             AGAP (test code = AGAP) 11.3         10.0-20.0                 



[facility]2017-01-24 16:51:00





             Test Item    Value        Reference Range Interpretation Comments

 

             Glucose Lvl (test code = Glucose Lvl) 109          70-99           

          



[facility]2017-01-24 16:51:00





             Test Item    Value        Reference Range Interpretation Comments

 

             eGFR (test code = eGFR) 52                                     



[facility]2017-01-24 16:51:00





             Test Item    Value        Reference Range Interpretation Comments

 

             Creatinine Lvl (test code = Creatinine 1.15         0.50-1.40      

           



             Lvl)                                                



[facility]2017-01-24 16:51:00





             Test Item    Value        Reference Range Interpretation Comments

 

             BUN (test code = BUN) 16           -                      



[facility]2017-01-24 16:51:00





             Test Item    Value        Reference Range Interpretation Comments

 

             Chloride Lvl (test code = Chloride Lvl) 109                  

            



[facility]2017-01-24 16:51:00





             Test Item    Value        Reference Range Interpretation Comments

 

             Potassium Lvl (test code = Potassium 4.3          3.5-5.1          

         



             Lvl)                                                



[facility]2017-01-24 16:51:00





             Test Item    Value        Reference Range Interpretation Comments

 

             Sodium Lvl (test code = Sodium Lvl) 144          135-145           

        



[facility]2017-01-24 16:51:00





             Test Item    Value        Reference Range Interpretation Comments

 

             CO2 (test code = CO2) 28           -                     



[facility]2017-01-24 16:51:00





             Test Item    Value        Reference Range Interpretation Comments

 

             Calcium Lvl (test code = Calcium Lvl) 8.4          8.5-10.5        

          



[facility]2017-01-24 16:51:00





             Test Item    Value        Reference Range Interpretation Comments

 

             AGAP (test code = AGAP) 11.3         10.0-20.0                 



[facility]2017-01-24 16:51:00





             Test Item    Value        Reference Range Interpretation Comments

 

             Glucose Lvl (test code = Glucose Lvl) 109          70-99           

          



[facility]2017-01-24 16:51:00





             Test Item    Value        Reference Range Interpretation Comments

 

             eGFR (test code = eGFR) 52                                     



[facility]2017-01-24 16:51:00





             Test Item    Value        Reference Range Interpretation Comments

 

             Creatinine Lvl (test code = Creatinine 1.15         0.50-1.40      

           



             Lvl)                                                



[facility]2017-01-24 16:51:00





             Test Item    Value        Reference Range Interpretation Comments

 

             BUN (test code = BUN) 16           -                      



[facility]2017-01-24 16:51:00





             Test Item    Value        Reference Range Interpretation Comments

 

             Chloride Lvl (test code = Chloride Lvl) 109                  

            



[facility]2017-01-24 16:51:00





             Test Item    Value        Reference Range Interpretation Comments

 

             Potassium Lvl (test code = Potassium 4.3          3.5-5.1          

         



             Lvl)                                                



[facility]2017-01-24 16:51:00





             Test Item    Value        Reference Range Interpretation Comments

 

             Sodium Lvl (test code = Sodium Lvl) 144          135-145           

        



[facility]2017-01-24 16:51:00





             Test Item    Value        Reference Range Interpretation Comments

 

             CO2 (test code = CO2) 28           -                     



[facility]2017-01-24 16:51:00





             Test Item    Value        Reference Range Interpretation Comments

 

             Calcium Lvl (test code = Calcium Lvl) 8.4          8.5-10.5        

          



[facility]2017-01-24 16:51:00





             Test Item    Value        Reference Range Interpretation Comments

 

             AGAP (test code = AGAP) 11.3         10.0-20.0                 



[facility]2017-01-24 16:51:00





             Test Item    Value        Reference Range Interpretation Comments

 

             Glucose Lvl (test code = Glucose Lvl) 109          70-99           

          



[facility]2017-01-24 16:51:00





             Test Item    Value        Reference Range Interpretation Comments

 

             eGFR (test code = eGFR) 52                                     



[facility]2017-01-24 16:51:00





             Test Item    Value        Reference Range Interpretation Comments

 

             Creatinine Lvl (test code = Creatinine 1.15         0.50-1.40      

           



             Lvl)                                                



[facility]2017-01-24 16:51:00





             Test Item    Value        Reference Range Interpretation Comments

 

             BUN (test code = BUN) 16           -                      



[facility]2017-01-24 16:51:00





             Test Item    Value        Reference Range Interpretation Comments

 

             Chloride Lvl (test code = Chloride Lvl) 109                  

            



[facility]2017-01-24 16:51:00





             Test Item    Value        Reference Range Interpretation Comments

 

             Potassium Lvl (test code = Potassium 4.3          3.5-5.1          

         



             Lvl)                                                



[facility]2017-01-24 16:51:00





             Test Item    Value        Reference Range Interpretation Comments

 

             Sodium Lvl (test code = Sodium Lvl) 144          135-145           

        



[facility]2017-01-24 16:51:00





             Test Item    Value        Reference Range Interpretation Comments

 

             CO2 (test code = CO2) 28           -32                     



[facility]2017-01-24 16:51:00





             Test Item    Value        Reference Range Interpretation Comments

 

             Calcium Lvl (test code = Calcium Lvl) 8.4          8.5-10.5        

          



[facility]2017-01-24 16:51:00





             Test Item    Value        Reference Range Interpretation Comments

 

             AGAP (test code = AGAP) 11.3         10.0-20.0                 



[facility]2017-01-24 16:51:00





             Test Item    Value        Reference Range Interpretation Comments

 

             Glucose Lvl (test code = Glucose Lvl) 109          70-99           

          



[facility]2017-01-24 16:51:00





             Test Item    Value        Reference Range Interpretation Comments

 

             eGFR (test code = eGFR) 52                                     



[facility]2017-01-24 16:51:00





             Test Item    Value        Reference Range Interpretation Comments

 

             Creatinine Lvl (test code = Creatinine 1.15         0.50-1.40      

           



             Lvl)                                                



[facility]2017-01-24 16:51:00





             Test Item    Value        Reference Range Interpretation Comments

 

             BUN (test code = BUN) 16           -                      



[facility]2017-01-24 16:51:00





             Test Item    Value        Reference Range Interpretation Comments

 

             Chloride Lvl (test code = Chloride Lvl) 109                  

            



[facility]2017-01-24 16:51:00





             Test Item    Value        Reference Range Interpretation Comments

 

             Potassium Lvl (test code = Potassium 4.3          3.5-5.1          

         



             Lvl)                                                



[facility]2017-01-24 16:51:00





             Test Item    Value        Reference Range Interpretation Comments

 

             Sodium Lvl (test code = Sodium Lvl) 144          135-145           

        



[facility]2017-01-24 16:51:00





             Test Item    Value        Reference Range Interpretation Comments

 

             CO2 (test code = CO2) 28                                



[facility]2017-01-24 16:51:00





             Test Item    Value        Reference Range Interpretation Comments

 

             Calcium Lvl (test code = Calcium Lvl) 8.4          8.5-10.5        

          



[facility]2017-01-24 16:51:00





             Test Item    Value        Reference Range Interpretation Comments

 

             AGAP (test code = AGAP) 11.3         10.0-20.0                 



[facility]2017-01-24 16:51:00





             Test Item    Value        Reference Range Interpretation Comments

 

             Glucose Lvl (test code = Glucose Lvl) 109          70-99           

          



[facility]2017-01-24 16:51:00





             Test Item    Value        Reference Range Interpretation Comments

 

             eGFR (test code = eGFR) 52                                     



[facility]2017-01-24 16:51:00





             Test Item    Value        Reference Range Interpretation Comments

 

             Creatinine Lvl (test code = Creatinine 1.15         0.50-1.40      

           



             Lvl)                                                



[facility]2017-01-24 16:51:00





             Test Item    Value        Reference Range Interpretation Comments

 

             BUN (test code = BUN) 16                                 



[facility]2017-01-24 16:51:00





             Test Item    Value        Reference Range Interpretation Comments

 

             Chloride Lvl (test code = Chloride Lvl) 109                  

            



[facility]2017-01-24 16:51:00





             Test Item    Value        Reference Range Interpretation Comments

 

             Potassium Lvl (test code = Potassium 4.3          3.5-5.1          

         



             Lvl)                                                



[facility]2017-01-24 16:51:00





             Test Item    Value        Reference Range Interpretation Comments

 

             Sodium Lvl (test code = Sodium Lvl) 144          135-145           

        



[facility]2017-01-24 16:51:00





             Test Item    Value        Reference Range Interpretation Comments

 

             CO2 (test code = CO2) 28           24-32                     



[facility]2017-01-24 16:51:00





             Test Item    Value        Reference Range Interpretation Comments

 

             Calcium Lvl (test code = Calcium Lvl) 8.4          8.5-10.5        

          



[facility]2017-01-24 16:51:00





             Test Item    Value        Reference Range Interpretation Comments

 

             AGAP (test code = AGAP) 11.3         10.0-20.0                 



[facility]2017-01-24 16:51:00





             Test Item    Value        Reference Range Interpretation Comments

 

             Glucose Lvl (test code = Glucose Lvl) 109          70-99           

          



[facility]2017-01-24 16:51:00





             Test Item    Value        Reference Range Interpretation Comments

 

             eGFR (test code = eGFR) 52                                     



[facility]

## 2023-01-21 PROCEDURE — 5A1D70Z PERFORMANCE OF URINARY FILTRATION, INTERMITTENT, LESS THAN 6 HOURS PER DAY: ICD-10-PCS

## 2023-01-21 RX ADMIN — MIDODRINE HYDROCHLORIDE SCH MG: 5 TABLET ORAL at 15:12

## 2023-01-21 RX ADMIN — METOPROLOL TARTRATE SCH: 25 TABLET ORAL at 09:00

## 2023-01-21 RX ADMIN — SODIUM ZIRCONIUM CYCLOSILICATE SCH GM: 10 POWDER, FOR SUSPENSION ORAL at 20:26

## 2023-01-21 RX ADMIN — APIXABAN SCH MG: 2.5 TABLET, FILM COATED ORAL at 20:27

## 2023-01-21 RX ADMIN — METOPROLOL TARTRATE SCH: 25 TABLET ORAL at 19:11

## 2023-01-21 RX ADMIN — GABAPENTIN SCH MG: 300 CAPSULE ORAL at 20:27

## 2023-01-21 RX ADMIN — MIDODRINE HYDROCHLORIDE SCH MG: 5 TABLET ORAL at 20:26

## 2023-01-21 RX ADMIN — HEPARIN SODIUM PRN UNIT: 1000 INJECTION, SOLUTION INTRAVENOUS; SUBCUTANEOUS at 15:32

## 2023-01-21 RX ADMIN — ALBUMIN (HUMAN) ONE: 5 SOLUTION INTRAVENOUS at 11:29

## 2023-01-21 RX ADMIN — Medication SCH ML: at 20:50

## 2023-01-21 RX ADMIN — IBUPROFEN SCH: 600 TABLET ORAL at 17:23

## 2023-01-21 RX ADMIN — PANTOPRAZOLE SODIUM SCH MG: 40 TABLET, DELAYED RELEASE ORAL at 08:19

## 2023-01-21 RX ADMIN — SEVELAMER CARBONATE SCH MG: 800 TABLET, FILM COATED ORAL at 20:26

## 2023-01-21 RX ADMIN — SODIUM ZIRCONIUM CYCLOSILICATE SCH GM: 10 POWDER, FOR SUSPENSION ORAL at 09:07

## 2023-01-21 RX ADMIN — AMIODARONE HYDROCHLORIDE SCH MG: 200 TABLET ORAL at 08:18

## 2023-01-21 RX ADMIN — MIDODRINE HYDROCHLORIDE SCH MG: 5 TABLET ORAL at 09:09

## 2023-01-21 RX ADMIN — SEVELAMER CARBONATE SCH MG: 800 TABLET, FILM COATED ORAL at 08:19

## 2023-01-21 RX ADMIN — APIXABAN SCH MG: 2.5 TABLET, FILM COATED ORAL at 08:26

## 2023-01-21 RX ADMIN — HYDROMORPHONE HYDROCHLORIDE PRN MG: 1 INJECTION, SOLUTION INTRAMUSCULAR; INTRAVENOUS; SUBCUTANEOUS at 16:20

## 2023-01-21 RX ADMIN — SEVELAMER CARBONATE SCH MG: 800 TABLET, FILM COATED ORAL at 15:12

## 2023-01-21 RX ADMIN — IBUPROFEN SCH: 600 TABLET ORAL at 12:00

## 2023-01-21 RX ADMIN — AMIODARONE HYDROCHLORIDE SCH MG: 200 TABLET ORAL at 20:27

## 2023-01-21 RX ADMIN — Medication SCH ML: at 08:19

## 2023-01-21 RX ADMIN — ALBUMIN (HUMAN) ONE MLS: 5 SOLUTION INTRAVENOUS at 12:00

## 2023-01-21 RX ADMIN — Medication SCH: at 21:00

## 2023-01-21 RX ADMIN — SODIUM ZIRCONIUM CYCLOSILICATE SCH GM: 10 POWDER, FOR SUSPENSION ORAL at 15:13

## 2023-01-21 NOTE — P.HP
Certification for Inpatient


Patient admitted to: Observation


With expected LOS: <2 Midnights


Patient will require the following post-hospital care: None


Practitioner: I am a practitioner with admitting privileges, knowledge of 

patient current condition, hospital course, and medical plan of care.


Services: Services provided to patient in accordance with Admission requirements

found in Title 42 Section 412.3 of the Code of Federal Regulations





Patient History


Date of Service: 01/21/23


Primary Care Provider: Brittni


Reason for admission: fluid overload


History of Present Illness: 





Patient is well know to me.  Has chf and esrd.  She seems to have missed her 

Thursday dialysis.  She does not know why she was here.  May have had some 

diarrhea.  The patient was unresponsive when she came to the ER.  She is awake 

and off the bipap this morning.  Nephrology has been consulted. 


Allergies





cefepime Allergy (Verified 01/21/23 04:23)


   Hives


codeine Allergy (Verified 01/21/23 04:23)


   Itching


levofloxacin Allergy (Verified 01/21/23 04:23)


   Hives/Rash


morphine Allergy (Verified 01/21/23 04:23)


   Itching


Penicillins Allergy (Verified 01/21/23 04:23)


   Hives/Rash


sulfamethoxazole [From Bactrim] Allergy (Verified 01/21/23 04:23)


   Hives


trimethoprim [From Bactrim] Allergy (Verified 01/21/23 04:23)


   Hives





Home Medications: 








Apixaban [Eliquis] 2.5 mg PO BID 07/29/22 


Bumetanide 2 tab PO DAILY 07/29/22 


Midodrine HCl 5 mg PO TID 07/29/22 


Pantoprazole [Protonix Tab*] 1 tab PO DAILY 07/29/22 


Sevelamer Carbonate [Renvela] 800 mg PO TID 07/29/22 


Amiodarone HCl [Cordarone*] 200 mg PO BID 90 Days #180 tab 12/30/22 


Alendronate Sodium 70 mg PO MO 01/04/23 


Gabapentin 300 mg PO BEDTIME 01/04/23 


Promethazine Tab [Phenergan*] 25 mg PO Q6HP PRN 30 Days #90 tab 01/05/23 


Brimonidine Tartrate [Alphagan P] 1 drop EACH EYE BID 01/15/23 


Metoprolol Tartrate [Lopressor*] 12.5 mg PO BID 01/15/23 


Minocycline HCl [Minocin] 1 tab PO BID 01/15/23 








- Past Medical/Surgical History


Has patient received pneumonia vaccine in the past: Yes


Diabetic: No


-: Chronic hypotension


-: Hyperlipidemia


-: Chronic anticoagulation use


-: History of nephrolithiasis requiring stent placement


-: Recurrent UTI


-: End-stage renal disease


-: CHF


-: HTN


-: A-Fib


-: tubal ligation


-: dialysis port


-: cholecystectomy


-: right broken wrist


-: lasik


-: cataracts removed


-: back surgery





- Family History


  ** Sister


-: Cancer


Notes: per pt, sister has lung cancer and is being treated for a "spot on her 

brain"





  ** Father


-: Lung disease





- Social History


Smoking Status: Never smoker


Alcohol use: No


CD- Drugs: No


Caffeine use: Yes


Place of Residence: Home





Review of Systems


10-point ROS is otherwise unremarkable





Physical Examination





- Vital Signs


Temperature: 97.5 F


Blood Pressure: 97/52


Pulse: 66


Respirations: 20


Pulse Ox (%): 94





- Physical Exam


General: Alert, In no apparent distress


HEENT: Atraumatic, PERRLA, Mucous membr. moist/pink, EOMI, Sclerae nonicteric


Neck: Supple, 2+ carotid pulse no bruit, No LAD, Without JVD or thyroid 

abnormality


Respiratory: Clear to auscultation bilaterally, Normal air movement


Cardiovascular: Regular rate/rhythm, Normal S1 S2


Gastrointestinal: Normal bowel sounds, No tenderness


Musculoskeletal: No tenderness


Integumentary: No rashes


Neurological: Normal gait, Normal speech, Normal strength at 5/5 x4 extr, Normal

 tone, Normal affect


Lymphatics: No axilla or inguinal lymphadenopathy





- Studies


Laboratory Data (last 24 hrs)





01/20/23 19:13: PT 20.6 H, INR 1.87


01/20/23 19:13: WBC 14.10 H, Hgb 9.9 L, Hct 33.5 L, Plt Count 179


01/20/23 19:13: Sodium 135 L, Potassium 5.5 H, BUN 78 H, Creatinine 9.41 H*, 

Glucose 70 L, Magnesium 2.6 H





Microbiology Data (last 24 hrs): 








01/20/23 21:48   Blood  - Blood   Anaerobic Blood Culture - Final








Assessment and Plan





- Problems (Diagnosis)


(1) ESRD (end stage renal disease) on dialysis


Current Visit: No   Status: Chronic   


Plan: 


restart dialysis with Dr. Medrano's group. 








(2) Atrial fibrillation


Current Visit: No   Status: Acute   


Plan: 


continue amiodarone and metoprolol 


Qualifiers: 


 





(3) CHF (congestive heart failure)


Current Visit: No   Status: Chronic   


Plan: 


will continue metoprolol.  She should be on a nocturnal cpap for her sleep 

apnea.  However the patient is not compliant.  She comes in several times and 

requires positive pressure ventilation.   She take it off as soon as she wakes 

up and refuses it.  


Qualifiers: 


   Heart failure type: diastolic   Heart failure chronicity: chronic   Qualified

 Code(s): I50.32 - Chronic diastolic (congestive) heart failure   





(4) Non-compliance with renal dialysis


Current Visit: No   Status: Chronic   


Plan: 


Patient is poorly compliant with her treatment.  Of course she has chf and esrd.

  A very poor long term prognosis.  She most likely has very poor quality of 

life.  Which would make her non-compliance understandable. 








- Advance Directives


Does patient have a Living Will: No


Does patient have a Durable POA for Healthcare: No





- Code Status/Comfort Care


Code Status Assessed: Yes


Code Status: Full Code


Physician Review: Patient Assessed, Agree with Above Assessment and Plan


Critical Care: No


Time Spent Managing Pts Care (In Minutes): 45

## 2023-01-22 LAB
ALBUMIN SERPL BCP-MCNC: 3.1 G/DL (ref 3.4–5)
ALP SERPL-CCNC: 128 U/L (ref 45–117)
ALT SERPL W P-5'-P-CCNC: 18 U/L (ref 13–56)
AST SERPL W P-5'-P-CCNC: 14 U/L (ref 15–37)
BUN BLD-MCNC: 55 MG/DL (ref 7–18)
GLUCOSE SERPLBLD-MCNC: 121 MG/DL (ref 74–106)
HCT VFR BLD CALC: 27.3 % (ref 36–45)
LYMPHOCYTES # SPEC AUTO: 0.5 K/UL (ref 0.7–4.9)
MCV RBC: 99.2 FL (ref 80–100)
PMV BLD: 8.1 FL (ref 7.6–11.3)
POTASSIUM SERPL-SCNC: 3.8 MMOL/L (ref 3.5–5.1)
RBC # BLD: 2.76 M/UL (ref 3.86–4.86)

## 2023-01-22 RX ADMIN — IBUPROFEN SCH: 600 TABLET ORAL at 11:58

## 2023-01-22 RX ADMIN — APIXABAN SCH MG: 2.5 TABLET, FILM COATED ORAL at 21:20

## 2023-01-22 RX ADMIN — SEVELAMER CARBONATE SCH: 800 TABLET, FILM COATED ORAL at 13:52

## 2023-01-22 RX ADMIN — MIDODRINE HYDROCHLORIDE SCH MG: 5 TABLET ORAL at 13:44

## 2023-01-22 RX ADMIN — AMIODARONE HYDROCHLORIDE SCH MG: 200 TABLET ORAL at 08:54

## 2023-01-22 RX ADMIN — APIXABAN SCH MG: 2.5 TABLET, FILM COATED ORAL at 08:55

## 2023-01-22 RX ADMIN — SODIUM ZIRCONIUM CYCLOSILICATE SCH GM: 10 POWDER, FOR SUSPENSION ORAL at 08:54

## 2023-01-22 RX ADMIN — IBUPROFEN SCH: 600 TABLET ORAL at 00:00

## 2023-01-22 RX ADMIN — SEVELAMER CARBONATE SCH MG: 800 TABLET, FILM COATED ORAL at 21:19

## 2023-01-22 RX ADMIN — MIDODRINE HYDROCHLORIDE SCH MG: 5 TABLET ORAL at 21:30

## 2023-01-22 RX ADMIN — SODIUM ZIRCONIUM CYCLOSILICATE SCH: 10 POWDER, FOR SUSPENSION ORAL at 13:52

## 2023-01-22 RX ADMIN — SEVELAMER CARBONATE SCH MG: 800 TABLET, FILM COATED ORAL at 08:55

## 2023-01-22 RX ADMIN — PANTOPRAZOLE SODIUM SCH MG: 40 TABLET, DELAYED RELEASE ORAL at 08:55

## 2023-01-22 RX ADMIN — Medication SCH ML: at 21:20

## 2023-01-22 RX ADMIN — FOLIC ACID SCH MG: 1 TABLET ORAL at 08:54

## 2023-01-22 RX ADMIN — METOPROLOL TARTRATE SCH: 25 TABLET ORAL at 08:55

## 2023-01-22 RX ADMIN — AMIODARONE HYDROCHLORIDE SCH MG: 200 TABLET ORAL at 21:20

## 2023-01-22 RX ADMIN — Medication SCH: at 08:56

## 2023-01-22 RX ADMIN — IBUPROFEN SCH: 600 TABLET ORAL at 17:34

## 2023-01-22 RX ADMIN — GABAPENTIN SCH MG: 300 CAPSULE ORAL at 21:20

## 2023-01-22 RX ADMIN — Medication SCH: at 21:00

## 2023-01-22 RX ADMIN — Medication SCH ML: at 08:55

## 2023-01-22 RX ADMIN — ONDANSETRON PRN MG: 2 INJECTION INTRAMUSCULAR; INTRAVENOUS at 16:02

## 2023-01-22 RX ADMIN — IBUPROFEN SCH: 600 TABLET ORAL at 06:00

## 2023-01-22 RX ADMIN — MIDODRINE HYDROCHLORIDE SCH MG: 5 TABLET ORAL at 08:54

## 2023-01-22 RX ADMIN — SODIUM ZIRCONIUM CYCLOSILICATE SCH GM: 10 POWDER, FOR SUSPENSION ORAL at 21:20

## 2023-01-22 RX ADMIN — METOPROLOL TARTRATE SCH: 25 TABLET ORAL at 21:00

## 2023-01-22 NOTE — CON
Date of Consultation:  01/21/2023



Chief Complaint:  End-stage renal disease, fluid overload, congestive heart failure, diastolic dysfun
ction acute on chronic, nausea, vomiting, hypotension.  Patient has history of congestive heart failu
re, obstructive sleep apnea, end-stage renal disease, history of urosepsis, kidney stones, status pos
t cholecystectomy, history of acute pancreatitis.  The patient presented to the hospital because of g
eneralized weakness.  She missed dialysis on Thursday and became short of breath and blood pressure w
as declining.  Patient has history of chronic hypotension.  She takes midodrine 5 mg 3 times per day 
as well as takes midodrine before dialysis to prevent intradialytic hypotension.  The patient has his
tory of cardiac arrhythmia.  She is on amiodarone.



Review of Systems:

General:  Denies fever or chills. 

Eyes:  Denies vision changes. 

Ears, Nose, Mouth, and Throat:  Denies sore throat or earache. 

Respiratory System:  Dyspnea on exertion.  Denies syncope. 

GI:  Has nausea, vomiting.  Denies melena, hematemesis. 

:  Denies dysuria or hematuria.  Denies kidney colic. 



All other systems reviewed and all are negative.



Past Medical History:  Chronic hypotension, hyperlipidemia, end-stage renal disease, obesity, obstruc
tive sleep apnea, history of nephrolithiasis requiring stent placement, recurrent UTI, congestive hea
rt failure, diastolic dysfunction, atrial fibrillation, tubal ligation, dialysis port and dialysis ca
theter, cholecystectomy, right broken wrist, cataract removed, history of degenerative disk disease.



Family History:  Sister, lung cancer and metastatic disease to the brain from the lung disease.



Social History:  Never a smoker.  Denies alcohol or illicit drugs.



Physical Examination:

General:  Patient is in ICU.  She is lethargic and forgetful, but she denies chest pain, palpitation.
  She answers question.  She is alert, not in apparent distress. 

Eyes:  Anicteric sclerae.  EOMI. 

Ears, Nose, Mouth, and Throat:  Oral mucosa moist.  No pallor. 

Neck:  Supple.  No bruits. 

Lungs:  Few rhonchi. 

Heart:  S1, S2.  No pericardial friction rub. 

Abdomen:  Obese, soft, nontender. 

Extremities:  Slight edema in both legs. 

Neurological:  Moving extremities.  Cranial nerves intact.



Laboratory Data:  WBC 14.1, hemoglobin 9.9, platelet count 179,000.  Potassium 5.5, sodium 135, gluco
se 70, creatinine 9.41, magnesium 2.6.



Impression And Plan:  

1.End-stage renal disease.  Patient will have dialysis today with ultrafiltration.  Plan is to lev
nue midodrine for blood pressure support and albumin to prevent intradialytic hypotension and to adva
nce ultrafiltration.  Patient has fluid overload when she will require ultrafiltration done for volum
e control and congestive heart failure management.

2.Atrial fibrillation, on metoprolol for rate control and amiodarone.

3.Congestive heart failure, acute on chronic, fluid overload is the trigger for decompensation. 

4.Patient has history of obstructive sleep apnea and she will continue CPAP.

5.Anemia in chronic kidney disease.  Continue LENARD and monitor hemoglobin.

6.Renal osteodystrophy.  Continue renal diet and binders.

7.Nausea, vomiting.  Check lipase and amylase.  Check CT scan of the abdomen and pelvis, if not done
.





EB/MODL

DD:  01/21/2023 18:23:03Voice ID:  [id number]

DT:  01/22/2023 01:09:51Report ID:  [id number]

## 2023-01-22 NOTE — PN
Date of Progress Note:  01/22/2023



Chief Complaint:  End-stage renal disease, chronic hypotension, severe 
hypotension.



Subjective:  The patient is in ICU.  She received dialysis yesterday with 
ultrafiltration.  Midodrine was used for blood pressure support.  The patient 
came to the hospital because of generalized weakness, nausea, vomiting, and 
progressively worse hypotension.  Blood cultures were obtained and pending.  The
patient had CT scan for stone protocol done and it showed left kidney stone and 
hydronephrosis.  Urine specimen was obtained for culture and is pending.



Review of Systems:

The patient has generalized weakness.  Cannot provide review of systems.



Physical Examination:

Lungs:  Normal respiratory effort. 

Heart:  S1, S2. 

Abdomen:  Soft. 

Extremities:  Slight edema.



Impression And Plan:  

1.   End-stage renal disease.  Next dialysis is scheduled for tomorrow.

2.   Left kidney stone hydronephrosis.  Continue broad-spectrum antibiotics and 
continue to monitor culture.  Adjust antibiotic accordingly.  Urine culture is 
obtained.  Continue coverage for broad spectrum.

3.   Congestive heart failure, diastolic.  The patient is on low dose of 
metoprolol.  Monitor blood pressure closely.

4.   Sleep apnea.  Continue CPAP.

5.   Anemia of chronic kidney disease.  Monitor hemoglobin level.  

Adjust LENARD.

6.   Renal osteodystrophy.  Monitor phosphorus level and adjust binders.





EB/MODL

DD:  01/22/2023 21:44:19   Voice ID:  [id number]

DT:  01/22/2023 22:19:49   Report ID:  [id number]

ANANTH

## 2023-01-22 NOTE — RAD REPORT
EXAM DESCRIPTION:  CT - Stone Protocol - 1/22/2023 8:01 am

 

CLINICAL HISTORY:  Flank pain.

nausea , vomiting , back pain

 

COMPARISON:  Abdomen   Pelvis Wo Contrast dated 1/14/2023

 

TECHNIQUE:  Axial images were obtained without oral or IV contrast. Lack of contrast limits solid org
an and vascular assessment. The field-of-view spans the entirety of the  system partially obscuring
 uppermost abdomen and lung bases. Coronal reformatted images were obtained and reviewed.

 

All CT scans are performed using dose optimization technique as appropriate and may include automated
 exposure control or mA/KV adjustment according to patient size.

 

FINDINGS:  Mild atelectasis is present in both lung bases. Trace bilateral pleural fluid. Cardiac siz
e is prominent.

 

Liver size is prominent.Fluid density collection inferior to the right hepatic edge is unchanged rowena
uring approximately 14 cm. The spleen, adrenal glands and pancreas are within normal limits. Several 
stones are noted in the left kidney. In addition there is a 11 mm stone proximal left ureter resultin
g in moderate left hydronephrosis. Somewhat irregularly-shaped lesion posteroinferior left kidney aga
in seen measuring 20 mm. No hydronephrosis right kidney. Multiple cysts are present.

 

No bowel obstruction, free air, free fluid or abscess. Nonvisualized appendix.Cholecystectomy.

 

Mild lumbar degenerative changes.

 

IMPRESSION:  11 mm stone proximal left ureter with moderate left hydronephrosis.

 

Additional stones inferior posterior calyx left kidney.

## 2023-01-22 NOTE — P.PN
Cancer Nurse Navigator Note    Evelyne presented to the [facility] unaccompanied.  She was referred by Dr. Mckeon for her adenocarcinoma Lung Cancer.    The case was discussed in the presence of Medical Oncology (Dr. Patel and Andria), Radiation Oncology (Dr. Vyas), Thoracic Surgery (Dr. Berry) and the remainder of the physician specialists.  Imaging and Pathology reviewed and discussed. The PET scan revealed enlarging left upper lobe nodule, no lymphadenopathy.  The Brain MRI is not performed.  The recommendation is to biopsy the DEVIKA nodule. It appears to be mutli focal disease. Systemic treatment would include carboplatin/gemcitabine or pembrolizumab pending mutation results.    Upon conclusion of the discussion, patient was seen by Dr.'s Patel and Lilliam to explain the above recommendations. She is s/p right middle lobe wedge resection/lobectomy. The goal of treatment is curative intent for stage IIIA disease. This was reviewed in detail.    The case was reviewed for research eligibility.  She is not eligible for any trials.    Education: Patient Education:   Dx: Adenocarcinoma RML  Regimen: pending  Current medication were reviewed and verified. (See Medications)  Other teaching: Pt is a former smoker  Teaching Methods:  [facility] Lung Cancer resources and folder (see below).  Barriers to learning: emotional and work. Pt is a MA for [employer]  Learner Outcomes:  Patient and/or caregiver verbalize understanding of information given    NCCN Distress Tool Screening:  See Flowsheet for additional documentation.  She is understanding of the plan going forward. Appropriate questions were asked. We discussed emotional concerns.  Writer will provide the family with appropriate support referrals as needed.  Evelyne does not need additional information on Advance Directives at this time. Writer will contact Oncology Social Worker as appropriate.  We did review the folder of information  Subjective


Date of Service: 01/22/23


Primary Care Provider: Brittni


Chief Complaint: fluid overload


Subjective: No new changes





Review of Systems


10-point ROS is otherwise unremarkable


Gastrointestinal: Abdominal Pain





Physical Examination





- Vital Signs


Temperature: 97.3 F


Blood Pressure: 102/82


Pulse: 85


Respirations: 26


Pulse Ox (%): 95





- Physical Exam


General: Alert, In no apparent distress


HEENT: Atraumatic, PERRLA, EOMI


Neck: Supple, JVD not distended


Respiratory: Clear to auscultation bilaterally, Normal air movement


Cardiovascular: Regular rate/rhythm, Normal S1 S2


Gastrointestinal: Normal bowel sounds, No tenderness


Musculoskeletal: No tenderness


Integumentary: No rashes


Neurological: Normal speech, Normal tone, Normal affect


Lymphatics: No axilla or inguinal lymphadenopathy





- Studies


Microbiology Data (last 24 hrs): 








01/20/23 21:48   Blood  - Blood   Anaerobic Blood Culture - Final








Assessment And Plan





- Current Problems (Diagnosis)


(1) Left renal stone


Current Visit: Yes   Status: Chronic   


Plan: 


consult to Dr. FADY Roman.  He most likely will see her Monday night or Tuesday 

morning.  Will start her on flomax and work on pain management 











(2) ESRD (end stage renal disease) on dialysis


Current Visit: No   Status: Chronic   


Plan: 


restart dialysis with Dr. Medrano's group. 








(3) Atrial fibrillation


Current Visit: No   Status: Acute   


Plan: 


continue amiodarone and metoprolol 


Qualifiers: 


   Qualified Code(s): I48.20 - Chronic atrial fibrillation, unspecified; I48.2 -

Chronic atrial fibrillation   





(4) CHF (congestive heart failure)


Current Visit: No   Status: Chronic   


Plan: 


will continue metoprolol.  She should be on a nocturnal cpap for her sleep 

apnea.  However the patient is not compliant.  She comes in several times and 

requires positive pressure ventilation.   She take it off as soon as she wakes 

up and refuses it.  


Qualifiers: 


   Heart failure type: diastolic   Heart failure chronicity: chronic   Qualified

Code(s): I50.32 - Chronic diastolic (congestive) heart failure   





(5) Non-compliance with renal dialysis


Current Visit: No   Status: Chronic   


Plan: 


Patient is poorly compliant with her treatment.  Of course she has chf and esrd.

 A very poor long term prognosis.  She most likely has very poor quality of 

life.  Which would make her non-compliance understandable. 





Discharge Plan: Home


Plan to discharge in: Greater than 2 days





- Code Status/Comfort Care


Code Status Assessed: No


Physician Review: Patient Assessed, Agree with Above Assessment and Plan


Critical Care: No


Time Spent Managing PTS Care (In Minutes): 20 received.  This included: \"Be a Survivor, Lung Cancer,\" [facility] Services and support group material, Integrative Medicine material, [employer] brochure, Cancer Rehab brochure and my contact information. I did take notes of all that was discussed today including next steps and provided this in writing to patient.    All questions were answered at this time.    Next Steps/Plan:  She will receive a call from Interventional Radiology to schedule biopsy.  Pending this, we will schedule the next set of appts for discussion and next steps.    Contact information provided.  Emotional support given.  Encouraged Evelyne to contact me with any concerns or questions, contact information given.  Everyone is agreeable and verbalized understanding.

## 2023-01-23 LAB
ALBUMIN SERPL BCP-MCNC: 3 G/DL (ref 3.4–5)
ALP SERPL-CCNC: 111 U/L (ref 45–117)
ALT SERPL W P-5'-P-CCNC: 13 U/L (ref 13–56)
ANISOCYTOSIS BLD QL: (no result)
AST SERPL W P-5'-P-CCNC: 12 U/L (ref 15–37)
BUN BLD-MCNC: 59 MG/DL (ref 7–18)
BUN BLD-MCNC: 62 MG/DL (ref 7–18)
GLUCOSE SERPLBLD-MCNC: 111 MG/DL (ref 74–106)
GLUCOSE SERPLBLD-MCNC: 117 MG/DL (ref 74–106)
HCT VFR BLD CALC: 29.7 % (ref 36–45)
HDLC SERPL-MCNC: 34 MG/DL (ref 40–60)
INR BLD: 1.55
LDLC SERPL CALC-MCNC: 24 MG/DL (ref ?–130)
LYMPHOCYTES # SPEC AUTO: 0.8 K/UL (ref 0.7–4.9)
MACROCYTES BLD QL: SLIGHT
MCV RBC: 100.9 FL (ref 80–100)
MORPHOLOGY BLD-IMP: (no result)
PMV BLD: 8.1 FL (ref 7.6–11.3)
POIKILOCYTOSIS BLD QL SMEAR: SLIGHT
POLYCHROMASIA BLD QL SMEAR: SLIGHT
POTASSIUM SERPL-SCNC: 3.8 MMOL/L (ref 3.5–5.1)
POTASSIUM SERPL-SCNC: 4 MMOL/L (ref 3.5–5.1)
RBC # BLD: 2.95 M/UL (ref 3.86–4.86)

## 2023-01-23 RX ADMIN — Medication SCH: at 09:00

## 2023-01-23 RX ADMIN — Medication SCH ML: at 22:19

## 2023-01-23 RX ADMIN — ALBUMIN (HUMAN) ONE MLS: 5 SOLUTION INTRAVENOUS at 12:30

## 2023-01-23 RX ADMIN — IBUPROFEN SCH MG: 600 TABLET ORAL at 18:22

## 2023-01-23 RX ADMIN — PANTOPRAZOLE SODIUM SCH: 40 TABLET, DELAYED RELEASE ORAL at 09:00

## 2023-01-23 RX ADMIN — SEVELAMER CARBONATE SCH MG: 800 TABLET, FILM COATED ORAL at 11:26

## 2023-01-23 RX ADMIN — IBUPROFEN SCH: 600 TABLET ORAL at 12:00

## 2023-01-23 RX ADMIN — MIDODRINE HYDROCHLORIDE SCH: 5 TABLET ORAL at 14:00

## 2023-01-23 RX ADMIN — ALBUMIN (HUMAN) ONE: 5 SOLUTION INTRAVENOUS at 12:22

## 2023-01-23 RX ADMIN — APIXABAN SCH: 2.5 TABLET, FILM COATED ORAL at 09:00

## 2023-01-23 RX ADMIN — METOPROLOL TARTRATE SCH: 25 TABLET ORAL at 09:00

## 2023-01-23 RX ADMIN — METOPROLOL TARTRATE SCH: 25 TABLET ORAL at 21:00

## 2023-01-23 RX ADMIN — IBUPROFEN SCH: 600 TABLET ORAL at 06:00

## 2023-01-23 RX ADMIN — ALBUMIN (HUMAN) ONE MLS: 5 SOLUTION INTRAVENOUS at 13:04

## 2023-01-23 RX ADMIN — AMIODARONE HYDROCHLORIDE SCH MG: 200 TABLET ORAL at 11:27

## 2023-01-23 RX ADMIN — AMIODARONE HYDROCHLORIDE SCH: 200 TABLET ORAL at 21:00

## 2023-01-23 RX ADMIN — GABAPENTIN SCH: 300 CAPSULE ORAL at 21:00

## 2023-01-23 RX ADMIN — SEVELAMER CARBONATE SCH: 800 TABLET, FILM COATED ORAL at 14:00

## 2023-01-23 RX ADMIN — TAMSULOSIN HYDROCHLORIDE SCH MG: 0.4 CAPSULE ORAL at 11:26

## 2023-01-23 RX ADMIN — HEPARIN SODIUM PRN UNIT: 1000 INJECTION, SOLUTION INTRAVENOUS; SUBCUTANEOUS at 15:02

## 2023-01-23 RX ADMIN — EPOETIN ALFA-EPBX SCH UNIT: 4000 INJECTION, SOLUTION INTRAVENOUS; SUBCUTANEOUS at 14:15

## 2023-01-23 RX ADMIN — FOLIC ACID SCH MG: 1 TABLET ORAL at 11:26

## 2023-01-23 RX ADMIN — SEVELAMER CARBONATE SCH: 800 TABLET, FILM COATED ORAL at 21:00

## 2023-01-23 RX ADMIN — Medication SCH ML: at 11:27

## 2023-01-23 RX ADMIN — SODIUM ZIRCONIUM CYCLOSILICATE SCH: 10 POWDER, FOR SUSPENSION ORAL at 21:00

## 2023-01-23 RX ADMIN — SODIUM ZIRCONIUM CYCLOSILICATE SCH: 10 POWDER, FOR SUSPENSION ORAL at 09:00

## 2023-01-23 RX ADMIN — SODIUM ZIRCONIUM CYCLOSILICATE SCH: 10 POWDER, FOR SUSPENSION ORAL at 13:51

## 2023-01-23 RX ADMIN — HEPARIN SODIUM AND DEXTROSE SCH MLS: 5000; 5 INJECTION INTRAVENOUS at 12:57

## 2023-01-23 RX ADMIN — MIDODRINE HYDROCHLORIDE SCH: 5 TABLET ORAL at 21:00

## 2023-01-23 RX ADMIN — MIDODRINE HYDROCHLORIDE SCH MG: 5 TABLET ORAL at 11:33

## 2023-01-23 RX ADMIN — Medication SCH: at 21:00

## 2023-01-23 RX ADMIN — IBUPROFEN SCH: 600 TABLET ORAL at 00:00

## 2023-01-23 NOTE — EKG
Test Date:    2023-01-20               Test Time:    19:38:55

Technician:   BROOKLYN                                    

                                                     

MEASUREMENT RESULTS:                                       

Intervals:                                           

Rate:         82                                     

AR:                                                  

QRSD:         106                                    

QT:           396                                    

QTc:          462                                    

Axis:                                                

P:                                                   

AR:                                                  

QRS:          115                                    

T:            -80                                    

                                                     

INTERPRETIVE STATEMENTS:                                       

                                                     

Atrial fibrillation

Right axis deviation

Incomplete right bundle branch block

Possible Right ventricular hypertrophy

Nonspecific T wave abnormality, probably digitalis effect

Abnormal ECG

Compared to ECG 01/14/2023 09:29:07

No significant changes



Electronically Signed On 01-23-23 16:55:53 CST by Venancio Barajas

## 2023-01-23 NOTE — P.PN
Subjective


Date of Service: 01/23/23


Primary Care Provider: Brittni


Chief Complaint: fluid overload


Subjective: No new changes





Review of Systems


Unremarkable





Physical Examination





- Vital Signs


Temperature: 97.2 F


Blood Pressure: 113/58


Pulse: 102


Respirations: 20


Pulse Ox (%): 95





- Physical Exam


General: Alert, In no apparent distress


HEENT: Atraumatic, PERRLA, EOMI


Neck: Supple, JVD not distended


Respiratory: Clear to auscultation bilaterally, Normal air movement


Cardiovascular: Regular rate/rhythm, Normal S1 S2


Gastrointestinal: Normal bowel sounds, No tenderness


Musculoskeletal: No tenderness


Integumentary: No rashes


Neurological: Normal speech, Normal tone, Normal affect


Lymphatics: No axilla or inguinal lymphadenopathy





Assessment And Plan





- Current Problems (Diagnosis)


(1) Left renal stone


Current Visit: Yes   Status: Chronic   


Plan: 


consult to Dr. FADY Roman.  He most likely will see her Monday night or Tuesday 

morning.  Will start her on flomax and work on pain management 











(2) ESRD (end stage renal disease) on dialysis


Current Visit: No   Status: Chronic   


Plan: 


restart dialysis with Dr. Medrano's group. 








(3) Atrial fibrillation


Current Visit: No   Status: Acute   


Plan: 


continue amiodarone and metoprolol 


Qualifiers: 


 





(4) CHF (congestive heart failure)


Current Visit: No   Status: Chronic   


Plan: 


will continue metoprolol.  She should be on a nocturnal cpap for her sleep 

apnea.  However the patient is not compliant.  She comes in several times and 

requires positive pressure ventilation.   She take it off as soon as she wakes 

up and refuses it.  


Qualifiers: 


   Heart failure type: diastolic   Heart failure chronicity: chronic   Qualified

Code(s): I50.32 - Chronic diastolic (congestive) heart failure   





(5) Non-compliance with renal dialysis


Current Visit: No   Status: Chronic   


Plan: 


Patient is poorly compliant with her treatment.  Of course she has chf and esrd.

 A very poor long term prognosis.  She most likely has very poor quality of 

life.  Which would make her non-compliance understandable. 





Discharge Plan: Home


Plan to discharge in: 48 Hours





- Code Status/Comfort Care


Code Status Assessed: No


Physician Review: Patient Assessed, Agree with Above Assessment and Plan


Critical Care: No


Time Spent Managing PTS Care (In Minutes): 20

## 2023-01-23 NOTE — RAD REPORT
EXAM DESCRIPTION:  CT - Head angio - 1/23/2023 11:20 am

 

CLINICAL HISTORY:  s/p code stroke

Headache, drowsiness, CVA symptomology

 

COMPARISON:  Ct Stroke Brain Wo Cont dated 1/23/2023; Head Brain Wo Cont dated 1/14/2023; Neck Angio 
dated 1/23/2023

 

TECHNIQUE:  CT angiography of the head was performed with MIPs.

 

All CT scans are performed using dose optimization technique as appropriate and may

include automated exposure control or mA/KV adjustment according to patient size.

 

FINDINGS:  No evidence of aneurysm is detected. No flow-limiting stenosis or vascular malformation id
entified. No large vessel occlusion evident.

 

Antegrade flow is seen in the vertebral arteries. The vertebral arteries are codominant.

 

The visualized dural venous sinuses are patent.

 

IMPRESSION:  No significant flow abnormality is detected.

## 2023-01-23 NOTE — RAD REPORT
EXAM DESCRIPTION:  CT - Neck Angio - 1/23/2023 11:22 am

 

CLINICAL HISTORY:  s/p code stroke

Headache, drowsiness, CVA symptomology

 

COMPARISON:  No comparisons

 

TECHNIQUE:  CT angiography of the neck vessels was performed with MIPs.

 

All CT scans are performed using dose optimization technique as appropriate and may

include automated exposure control or mA/KV adjustment according to patient size.

 

FINDINGS:  A left aortic arch is identified with normal three vessel configuration of the great vesse
ls.

 

No significant flow abnormality is seen of the common carotid bilaterally.

 

Atherosclerotic plaque is present involving the right carotid bulb. Luminal narrowing is estimated le
ss than 50% based on NASCET criteria. No significant left-sided carotid stenosis.

 

Normal flow is seen within both vertebral arteries.

 

 

IMPRESSION:  Stenosis of less than 50% based on NASCET criteria involves the right carotid bulb.

## 2023-01-23 NOTE — RAD REPORT
EXAM DESCRIPTION:  CT - Ct Stroke Brain Wo Cont - 1/23/2023 9:58 am

 

CLINICAL HISTORY:  Stroke Protocol

Headache, drowsiness, CVA symptomology

 

COMPARISON:  Head Brain Wo Cont dated 1/14/2023

 

TECHNIQUE:  All CT scans are performed using dose optimization technique as appropriate and may inclu
de automated exposure control or mA/KV adjustment according to patient size.

 

FINDINGS:  No intracranial hemorrhage, hydrocephalus or extra-axial fluid collection.No areas of brai
n edema or evidence of midline shift.

 

2 cm mucous retention cyst or polyp right maxillary antrum. Paranasal sinuses mastoids are otherwise 
clear. The calvarium is intact.

 

IMPRESSION:  No acute intracranial abnormality.

 

 The findings were discussed with nurse caring for the patient on the second floor on 01/23/2023 at 1
0 a.m. by telephone.

## 2023-01-23 NOTE — P.PN
Date of Service: 01/23/23





Code stroke was called this morning. Nursing staff noted that patient was 

significantly weaker in b/l upper extremities.


Patient just returned from stat CT head. Symptoms seems to be improving already 

per nursing staff.





NIHSS: 8


limb ataxia bilaterally


bilateral arms and legs weak, arms weaker, right side slightly weaker in 

comparison to left


normal speech, normal sensation, no vision loss





CT head done, negative


CTA head/neck


Neuro consulted


PCP alerted


repeat labs


risk stratification

## 2023-01-24 PROCEDURE — 0T778DZ DILATION OF LEFT URETER WITH INTRALUMINAL DEVICE, VIA NATURAL OR ARTIFICIAL OPENING ENDOSCOPIC: ICD-10-PCS

## 2023-01-24 PROCEDURE — BT1F1ZZ FLUOROSCOPY OF LEFT KIDNEY, URETER AND BLADDER USING LOW OSMOLAR CONTRAST: ICD-10-PCS

## 2023-01-24 RX ADMIN — Medication SCH: at 09:00

## 2023-01-24 RX ADMIN — HEPARIN SODIUM AND DEXTROSE SCH MLS: 5000; 5 INJECTION INTRAVENOUS at 07:12

## 2023-01-24 RX ADMIN — SEVELAMER CARBONATE SCH MG: 800 TABLET, FILM COATED ORAL at 07:51

## 2023-01-24 RX ADMIN — SEVELAMER CARBONATE SCH: 800 TABLET, FILM COATED ORAL at 09:00

## 2023-01-24 RX ADMIN — AMIODARONE HYDROCHLORIDE SCH: 200 TABLET ORAL at 09:00

## 2023-01-24 RX ADMIN — METOPROLOL TARTRATE SCH: 25 TABLET ORAL at 09:00

## 2023-01-24 RX ADMIN — TAMSULOSIN HYDROCHLORIDE SCH: 0.4 CAPSULE ORAL at 09:00

## 2023-01-24 RX ADMIN — ONDANSETRON PRN MG: 2 INJECTION INTRAMUSCULAR; INTRAVENOUS at 21:23

## 2023-01-24 RX ADMIN — MIDODRINE HYDROCHLORIDE SCH MG: 5 TABLET ORAL at 12:53

## 2023-01-24 RX ADMIN — MIDODRINE HYDROCHLORIDE SCH MG: 5 TABLET ORAL at 20:08

## 2023-01-24 RX ADMIN — GABAPENTIN SCH MG: 300 CAPSULE ORAL at 20:08

## 2023-01-24 RX ADMIN — HYDROMORPHONE HYDROCHLORIDE PRN MG: 1 INJECTION, SOLUTION INTRAMUSCULAR; INTRAVENOUS; SUBCUTANEOUS at 21:23

## 2023-01-24 RX ADMIN — MIDODRINE HYDROCHLORIDE SCH MG: 5 TABLET ORAL at 00:21

## 2023-01-24 RX ADMIN — MIDODRINE HYDROCHLORIDE SCH MG: 5 TABLET ORAL at 07:50

## 2023-01-24 RX ADMIN — FOLIC ACID SCH MG: 1 TABLET ORAL at 07:51

## 2023-01-24 RX ADMIN — IBUPROFEN SCH: 600 TABLET ORAL at 06:00

## 2023-01-24 RX ADMIN — PANTOPRAZOLE SODIUM SCH: 40 TABLET, DELAYED RELEASE ORAL at 09:00

## 2023-01-24 RX ADMIN — SEVELAMER CARBONATE SCH: 800 TABLET, FILM COATED ORAL at 20:09

## 2023-01-24 RX ADMIN — Medication SCH ML: at 20:08

## 2023-01-24 RX ADMIN — FOLIC ACID SCH: 1 TABLET ORAL at 09:00

## 2023-01-24 RX ADMIN — PANTOPRAZOLE SODIUM SCH MG: 40 TABLET, DELAYED RELEASE ORAL at 07:50

## 2023-01-24 RX ADMIN — METOPROLOL TARTRATE SCH: 25 TABLET ORAL at 20:08

## 2023-01-24 RX ADMIN — IBUPROFEN SCH: 600 TABLET ORAL at 00:00

## 2023-01-24 RX ADMIN — AMIODARONE HYDROCHLORIDE SCH MG: 200 TABLET ORAL at 20:08

## 2023-01-24 RX ADMIN — IBUPROFEN SCH: 600 TABLET ORAL at 23:52

## 2023-01-24 RX ADMIN — SODIUM CHLORIDE SCH MLS: 9 INJECTION, SOLUTION INTRAVENOUS at 20:07

## 2023-01-24 RX ADMIN — IBUPROFEN SCH: 600 TABLET ORAL at 12:00

## 2023-01-24 RX ADMIN — IBUPROFEN SCH: 600 TABLET ORAL at 18:00

## 2023-01-24 RX ADMIN — SEVELAMER CARBONATE SCH: 800 TABLET, FILM COATED ORAL at 13:01

## 2023-01-24 RX ADMIN — Medication SCH: at 20:08

## 2023-01-24 NOTE — PN
Chief Complain:  End-stage renal disease and hypotension.



Subjective:  The patient was admitted to ICU for hypotension.  She has history of congestive heart fa
ilure, diastolic dysfunction, obesity, and obstructive sleep apnea.  Blood cultures were obtained and
 pending.  CT scan for stone protocol showed left kidney stone and hydronephrosis.  Urology is consul
kolby.  Urine specimen was obtained for culture.  The patient was started on gentamicin.  Of note, the 
patient has multiple allergies and Infectious Disease was consulted.



Review of Systems:

Denies complaints although she developed right-sided weakness of upper extremity and workup is starte
d with Neurology for possible stroke.  She denies chest pain or palpitations.  Denies syncope.



Physical Examination:

Lungs:  Normal respiratory effort. 

Heart:  S1, S2. 

Abdomen:  Soft, obese. 

Extremities:  Slight edema.



Impression And Plan:  

1.End-stage renal disease.  The patient underwent CTA today.  Continue dialysis after IV contrast ex
posure.  The patient will have dialysis today.

2.Left kidney stone with hydronephrosis.  The patient will continue antibiotics for broad spectrum c
overage.  Adjust antibiotic and monitor vancomycin and gentamicin level.

3.Congestive heart failure, diastolic.  The patient is on low dose of metoprolol.  Monitor blood pre
ssure closely.

4.Obstructive sleep apnea.  Continue CPAP.

5.Anemia of chronic kidney disease.  Monitor hemoglobin level.  Adjust LENARD.

6.Renal osteodystrophy.  Continue binders and adjust medication according to lab results.





RENZO/JUAREZ

DD:  01/23/2023 23:06:26Voice ID:  [id number]

DT:  01/23/2023 23:58:35Report ID:  [id number]

## 2023-01-24 NOTE — P.PN
Subjective


Date of Service: 01/24/23


Primary Care Provider: Brittni


Chief Complaint: fluid overload





somlent.  Going to the ER for stenting today 





Review of Systems


is unable to be obtained





Physical Examination





- Vital Signs


Temperature: 97.0 F


Blood Pressure: 89/50


Pulse: 86


Respirations: 18


Pulse Ox (%): 98





- Physical Exam


General: Alert, In no apparent distress


HEENT: Atraumatic, PERRLA, EOMI


Neck: Supple, JVD not distended


Respiratory: Clear to auscultation bilaterally, Normal air movement


Cardiovascular: Regular rate/rhythm, Normal S1 S2


Gastrointestinal: Normal bowel sounds, No tenderness


Musculoskeletal: No tenderness


Integumentary: No rashes


Neurological: Normal speech, Normal tone, Normal affect


Lymphatics: No axilla or inguinal lymphadenopathy





Assessment And Plan





- Current Problems (Diagnosis)


(1) Left renal stone


Current Visit: Yes   Status: Chronic   


Plan: 


consult to Dr. FADY Roman.  He most likely will see her Monday night or Tuesday 

morning.  Will start her on flomax and work on pain management 


plans for stenting today 








(2) ESRD (end stage renal disease) on dialysis


Current Visit: No   Status: Chronic   


Plan: 


restart dialysis with Dr. Medrano's group. 








(3) Atrial fibrillation


Current Visit: No   Status: Acute   


Plan: 


continue amiodarone and metoprolol 


Qualifiers: 


 





(4) CHF (congestive heart failure)


Current Visit: No   Status: Chronic   


Plan: 


will continue metoprolol.  She should be on a nocturnal cpap for her sleep 

apnea.  However the patient is not compliant.  She comes in several times and 

requires positive pressure ventilation.   She take it off as soon as she wakes 

up and refuses it.  


Qualifiers: 


   Heart failure type: diastolic   Heart failure chronicity: chronic   Qualified

Code(s): I50.32 - Chronic diastolic (congestive) heart failure   





(5) Non-compliance with renal dialysis


Current Visit: No   Status: Chronic   


Plan: 


Patient is poorly compliant with her treatment.  Of course she has chf and esrd.

 A very poor long term prognosis.  She most likely has very poor quality of 

life.  Which would make her non-compliance understandable. 





Discharge Plan: Home


Plan to discharge in: Greater than 2 days





- Code Status/Comfort Care


Code Status Assessed: No


Physician Review: Patient Assessed, Agree with Above Assessment and Plan


Critical Care: No


Time Spent Managing PTS Care (In Minutes): 20

## 2023-01-24 NOTE — CON
Reason For Consultation:  Obstructive ureterolithiasis.



History Of Present Illness:  Ms. Rushing is a 67-year-old woman with a longstanding history of end-sta
ge renal disease requiring dialysis Tuesday, Thursday, and Saturday.  She has a history of occasional
ly missing her dialysis sessions potentially due to noncompliance, and she has been frequently admitt
ed in the past for volume overload.  She presented with a similar set of complaints on this admission
 in addition to the complaints of some pain, nonspecific in nature and duration, as well as diarrhea.
  The patient was largely nonresponsive during my visit with them, but her son was at the bedside and
 did respond and consent on her behalf.  She apparently was also unresponsive when she came to the em
ergency department, and her primary care physician, Dr. Elkins, noted that she was awake and off BiPAP
 when he saw her the next morning.  At the time of my visit, she was again nonresponsive and again re
quiring BiPAP.  Again, it is unclear if she had any new/acute pain associated with the presence of a 
stone that was observed on CT scan, the son suggested perhaps a week of some pain that may have been 
present.



Past Medical History:  

1.Congestive heart failure.

2.End-stage renal disease requiring dialysis.

3.GERD, trauma, cardiac arrhythmia, Eliquis dependent/atrial fibrillation.



Allergies:  SHE LISTS ALLERGIES TO CEFEPIME, LEVAQUIN, PENICILLINS, SULFAMETHOXAZOLE/BACTRIM AND TRIM
ETHOPRIM, ALL HIVES WITH ITCHING/INTOLERANCE TO CODEINE AND MORPHINE.



Family History:  Unable to obtain.



Social History:  Unable to obtain.



Physical Examination:

The patient was not nonresponsive.  Her breathing was somewhat labored with use of BiPAP.  She was mo
rbidly obese.  Her abdomen was soft and there was no evidence of tenderness.  No CVA tenderness was e
licited.  Positive lower extremity edema, nonpitting with no Gómez sign elicited.  All of the above 
limited due to the degree of the patient's responsiveness/diminished mental status.



Laboratory Analysis:  From 01/20/2023; pH 7.1 with a bicarb of 17.1, both low.  WBC 14.1 with hemoglo
bin and hematocrit 9.9/33.5, platelets 179K.  INR 1.87, creatinine 8.87.  She currently is on a hepar
in drip with a PTT ratio around 2.5 to 3 being held. 



01/22/2023, CT stone protocol findings:  Mild atelectasis bilaterally lung bases.  Prominent cardiac 
size.  Fluid density collection inferior to the right hepatic edge, unchanged from prior, measured ap
proximately 14 cm.  The spleen, adrenals, and pancreas within normal limits.  Several stones noted wi
thin the left kidney with an 11 mm proximal left ureteral calculus resulting in moderate left hydrone
phrosis.  Somewhat irregular shaped lesion posteroinferior left kidney again seen, measuring 20 mm.  
Right kidney without hydronephrosis, but with multiple cysts present.



Assessment And Recommendations:  This is a 67-year-old woman with atrial fibrillation, on Eliquis, no
w on heparin drip with PTT ratio 2.5 to 3 being held, on amiodarone also for atrial fibrillation, chr
onic kidney disease dialysis dependent, and gastroesophageal reflux disease, who presents acidotic an
d nonresponsive associated with an obstructing 11 mm left proximal ureteral calculus.

1.While it is clear from my discussions with Dr. Elkins, that the patient does not produce much urine
 on a routine basis and these changes may indeed be chronic, given the obstruction which could potent
ially destabilize her acid-base balance and potentially volume status, I would recommend operative ma
nagement to relieve the obstruction.

2.I counseled the patient's son on her behalf, since the patient was unresponsive, on the potential 
to be unable to successfully pass the ureteral stent and the potential need for percutaneous nephrost
jayden tube depending on the impaction of the stone.  I explained that definitive management of the ston
e would require subsequent operative intervention as we would not do it in the setting of her acute d
ecompensation.  I explained the detailed nature of the procedure to be performed and he expressed und
erstanding and willingness to consent on her behalf.

3.Hold heparin drip pending surgical intervention for cystoscopy, left retrograde pyelography and st
ent placement this afternoon.





WR/MODL

DD:  01/24/2023 09:15:07Voice ID:  [id number]

DT:  01/24/2023 10:09:07Report ID:  [id number]

## 2023-01-24 NOTE — P.PN
Subjective


Date of Service: 01/24/23


Primary Care Provider: Brittni


Chief Complaint: fluid overload





patient lying in bed with  at the bedside. No major event upon 

examination. 








Physical Examination





- Vital Signs


Temperature: 97.0 F


Blood Pressure: 89/50


Pulse: 86


Respirations: 18


Pulse Ox (%): 98





- Physical Exam


General: Alert, In no apparent distress, Oriented x3


Respiratory: Crackles/rales (all fields on 8 L of oxygen, NC)


Cardiovascular: No edema, Normal S1 S2


Gastrointestinal: Normal bowel sounds


Musculoskeletal: Other (stasis dermatitis)


Integumentary: Other (stasis dermatitis)


Neurological: Normal speech, Normal tone, Normal affect





- Studies








Albuterol Sulfate (Albuterol 2.5 Mg/3 Ml Neb Sol)  2.5 mg NEB [id number] PRN


   PRN Reason: WHEEZING


Amiodarone HCl (Amiodarone Hcl 200 Mg Tab)  200 mg PO BID [facility]


   Last Admin: 01/23/23 21:00 Dose:  Not Given


   


Epoetin [place] (Epoetin 4,000 Unit/Ml Vial)  4,000 unit IV EVERY HD [facility]


   Last Admin: 01/23/23 14:15 Dose:  4,000 unit


   


Folic Acid (Folic Acid 1 Mg Tablet)  1 mg PO DAILY [facility]


   Last Admin: 01/23/23 11:26 Dose:  1 mg


   


Gabapentin (Gabapentin 300 Mg Cap)  300 mg PO BEDTIME [facility]


   Last Admin: 01/23/23 21:00 Dose:  Not Given


   


Heparin Sodium (Porcine) (Heparin 1,000 Unit/Ml Vial)  4,000 unit IV EVERY HD 

PRN


   PRN Reason: DIALYSIS CATHETER CARE


   Last Admin: 01/23/23 15:02 Dose:  4,000 unit


   


Heparin Sodium (Porcine) (Heparin 1,000 Unit/Ml Vial)  0 unit IV PRN PRN


   PRN Reason: Heparin Re-Bolus Per Protocol


   Last Admin: 01/23/23 22:18 Dose:  3,000 unit


   


Home Med (Brimonidine Tartrate [Alphagan P])  1 drop OPTH BID [facility]


   Last Admin: 01/23/23 21:00 Dose:  Not Given


   


Hydromorphone HCl (Hydromorphone Hcl 0.5 Mg/0.5 Ml Inj)  0.5 mg IV Q4H PRN


   PRN Reason: Pain scale 8-10 (Severe)


   Last Admin: 01/21/23 16:20 Dose:  0.5 mg


   


Gentamicin Sulfate 190 mg/ (Sodium Chloride)  104.75 mls @ 104.75 mls/hr IVPB 

Q48H [facility]


   Last Admin: 01/22/23 23:37 Dose:  104.75 mls


   


Vancomycin HCl 1 gm/ Sodium (Chloride)  250 mls @ 250 mls/hr IVPB AFTER EACH 

DIALYSIS [facility]; Protocol


Heparin Sodium/Dextrose (Heparin Drip 25,000 Units/5oo Ml Premix)  25,000 unit 

in 500 mls @ 0 mls/hr IV UD [facility]; Protocol


   Last Admin: 01/24/23 07:12 Dose:  500 mls


   


Ibuprofen (Ibuprofen 600 Mg Tab)  600 mg PO Q6HR [facility]


   Last Admin: 01/24/23 06:00 Dose:  Not Given


   


Ipratropium Bromide (Ipratropium Brom 0.5mg/2.5ml)  0.5 mg NEB [id number] PRN


   PRN Reason: WHEEZING


Metoprolol Tartrate (Metoprolol Tar 25 Mg Tab)  12.5 mg PO BID [facility]


   Last Admin: 01/23/23 21:00 Dose:  Not Given


   


Midodrine (Midodrine Hcl 5 Mg Tablet)  10 mg PO TID [facility]


   Last Admin: 01/24/23 07:50 Dose:  10 mg


   


Ondansetron HCl (Ondansetron 4 Mg/2 Ml Vial)  8 mg IV Q8H PRN


   PRN Reason: NAUSEA / VOMITING


   Last Admin: 01/22/23 16:02 Dose:  8 mg


   


Pantoprazole Sodium (Pantoprazole 40mg Tablet)  40 mg PO DAILY [facility]; Protocol


   Last Admin: 01/23/23 09:00 Dose:  Not Given


   


Sevelamer Carbonate (Sevelamer Carbonate 800 Mg Tablet)  800 mg PO TID [facility]


   Last Admin: 01/23/23 21:00 Dose:  Not Given


   


Sodium Chloride (Flush Normal Saline 10 Ml)  10 ml IV BID [facility]


   Last Admin: 01/23/23 22:19 Dose:  10 ml


   


Tamsulosin HCl (Tamsulosin 0.4 Mg Sr Cap)  0.4 mg PO DAILY [facility]


   Last Admin: 01/23/23 11:26 Dose:  0.4 mg


   





                                  Microbiology





01/20/23 21:54   Blood  - Blood   Aerobic Blood Culture - Preliminary


                            No growth in 24 hours.


01/20/23 21:54   Blood  - Blood   Anaerobic Blood Culture - Preliminary


                            No growth in 24 hours.


01/20/23 21:48   Blood  - Blood   Aerobic Blood Culture - Preliminary


                            No growth in 24 hours.


01/20/23 21:48   Blood  - Blood   Anaerobic Blood Culture - Final











Assessment And Plan





- Current Problems (Diagnosis)


(1) Urinary tract infection


Current Visit: Yes   Status: Acute   


Plan: 





- possible urosepsis:





Cultures:


1/24 BC: Pending


1/22 UC: Pending


1/20 Stool: Pending


1/20 Parasites: Pending


1/20 BC: Negative





Antibiotics:


- IV Gentamicin, 1/22 to


- IV Vancomycin s/p HD, 1/22 to





Recommendation:


ID agrees to continue current IV antibiotics and will provide drug of choice 

when urine culture is available





Left renal stone: CT: 11 mm stone proximal left ureter with moderate left 

hydronephrosis. Possible stent placement











- Plan





- ESRD (end stage renal disease) on dialysis: Nephrology onboard


- Left hydronephrosis due to renal stone


- Left renal stone: CT: 11 mm stone proximal left ureter with moderate left 

hydronephrosis. Possible stent placement


- Atrial fibrillation: Managed by Primary team


- CHF (congestive heart failure): Managed by primary team


- Non-compliance with renal dialysis


- Chronic hypotension


- Hyperlipidemia


- Chronic anticoagulation use


- History of nephrolithiasis requiring stent placement


- Recurrent UTI


- End-stage renal disease


- HTN


- tubal ligation


- cholecystectomy


- right broken wrist


- cataracts removed


- back surgery





ID will closely monitoring the patient for signs of infection with fever and WBC

trends








Case has been discussed with Dr. Becerra N


Physician Review: Patient Assessed, Agree with Above Assessment and Plan

## 2023-01-24 NOTE — OP
Surgeon:  MILIND CHAVEZ



Preoperative Diagnoses:  

1.   Left nephroureterolithiasis.

2.   Bilateral complex renal cysts.

3.   SIRS/suspected sepsis versus septic shock.

4.   Left hydronephrosis.



Postoperative Diagnoses:  

1.   Left nephroureterolithiasis.

2.   Bilateral complex renal cysts.

3.   SIRS/suspected sepsis versus septic shock.

4.   Left hydronephrosis.

5.   Left pyonephrosis.

6.   Bladder lesion.

7.   Chronic cystitis.



Principle Procedures:

1.   cystoscopy

2.    Left retrograde pyelography

3.    Left ureteral stent placement

4.    Urethral Conde catheter placement



Indication For Procedure:  Ms. Rushing is a 67-year-old woman with multiple 
medical comorbidities including atrial fibrillation on Eliquis as well as 
amiodarone, chronic kidney disease dialysis dependent, and GERD, who is 
relatively noncompliant with her dialysis routine and presented slightly altered
with complaints of pain and diarrhea, found to have an obstructing left 11 mm 
proximal ureteral calculus associated with left-sided hydronephrosis and signs 
of SIRS/sepsis, potentially septic shock.  She was hypotensive and was 
ultimately placed on a heparin drip along with vancomycin and gentamicin with 
trough levels being monitored.  Her dialysis was continuing as an inpatient for 
CHF exacerbation.  I consulted with the patient earlier and explained the 
potential benefit to placement of a left ureteral stent to manage the acidosis 
that she had in case portion of it was being contributed to by her obstructing 
calculus, but also to decompress the left renal urine unit and potentially 
assist with her volume management.



Procedure In Detail:  The patient was consented, in fact the patient's son 
consented on her behalf, in the preoperative holding area before being 
transferred to the operative suite where general anesthesia was induced.  No 
additional antimicrobial therapy was given, given her IV antimicrobials and 
appropriate trough levels given on the floor.  The heparin drip had been held 
since around 7 or 8 o'clock this morning because the levels were 
supratherapeutic.  She was placed in the lithotomy position, padded and secured 
to the table appropriately.  Her genitalia were prepped with Hibiclens and she 
was draped in standard fashion.  The case was begun using a 22-Peruvian rigid 
cystoscope to traverse the urethra and into her bladder with ease.  The bladder 
was decompressed of very cloudy appearing urine and washed with saline in order 
to visualize the mucosa.  There was evidence of chronic cystitis with cystitis 
cystica and glandularis noted throughout the entirety of her bladder, 
particularly in the trigone and posterior zones.  Within the trigone region, 
there was a 3-5 mm nodulopapillary lesion emanating from the bladder mucosa 
likely consistent with nodular cystitis.  The ureteral orifices were otherwise 
orthotopic in location.  I thus cannulated the left ureteral orifice using a 5-
Peruvian ureteral access catheter, and assessed for any sign of purulent drainage.
 I then advanced the catheter into the mid ureter where significant purulent 
drainage did appear.  I then performed a gentle retrograde pyelogram as below. 



Left retrograde pyelography: 

Using a 70:30 mixture of Omnipaque and saline, contrast was injected via the 
lumen of the 5-Peruvian ureteral access catheter and observed under live 
fluoroscopic imagery until it progressed up the proximal ureter into the renal 
pelvis and begin to delineate some of the lower pole calyces as well as the 
renal pelvis.  No radiopaque calculus was identified.  I thus at this point 
began to visualize a significant increased degree of purulent drainage from the 
kidney likely elicited by the retrograde injection of contrast displacing the 
pus.  As a result, we collected several dozen cc of this purulent drainage from 
her left kidney and sent it for analysis as left renal aspirate urine culture.  
I then passed a Sensor wire via the 5-Peruvian ureteral access catheter and coiled
it within the renal pelvis.  Over the Sensor wire, I back-loaded the cystoscope 
and placed a 6-Peruvian by 24 cm double-J ureteral stent with a coil observed 
fluoroscopically in the renal pelvis and 1 cystoscopically formed in her 
bladder.  Her kidney did continue to emanate significant quantities of purulent 
fluid both out of the stent and around the stent.  As a result, I continued to 
irrigate her bladder and prepared to biopsy the nodular lesion seen in the 
trigone.  Using cold cup biopsy forceps, I then biopsied and removed the nodular
lesion in its entirety, and then switching to normal saline irrigant, I utilized
a Bugbee electrode at a cautery setting of 25-30 to fulgurate the base and stop 
any bleeding.  I then decompressed her bladder of fluid, urine and pus before 
refilling it and placing an 18-Peruvian Conde catheter with ease.  10 cc of 
sterile water was placed in the balloon, and the catheter was placed to bag 
drainage.  The patient was then awakened, transferred to a stretcher, and then 
transferred to the recovery room in good condition.



Complications:  None.



Discharge Disposition:  Again, this is a 67-year-old woman with atrial 
fibrillation, on Eliquis and amiodarone, as an inpatient on a heparin drip with 
supratherapeutic PTT ratio, heparin drip being held, chronic kidney disease 
dialysis dependent but noncompliant, and GERD with acidosis, left ureteral 
obstruction causing hydronephrosis due to ureterolithiasis, left pyonephrosis, 
and SIRS with suspected sepsis, possible septic shock associated with a bladder 
lesion in the setting of chronic cystitis, suspicious for nodular cystitis 
cystica. 



The patient will need to follow up with me in the Urology Clinic for review of 
the pathology of the bladder biopsy, but also to determine the appropriate 
timing for definitive left ureteroscopic management, elimination of the stone or
at least evaluation of the calcification in that region of the proximal ureter. 



Additionally, she will require evaluation of the slightly complex cystic lesion 
seen in her kidneys bilaterally.  This will likely require IV contrast CT done 
immediately prior to dialysis on 1 of her dialysis days, Tuesday, Thursday, 
Saturday.





DEBORA/JUAREZ

DD:  01/24/2023 16:13:57   Voice ID:  [id number]

DT:  01/24/2023 18:26:20   Report ID:  [id number]

ANANTH

## 2023-01-24 NOTE — PN
Date of Progress Note:  01/24/2023



Subjective:  The patient was admitted with overvolume, shortness of breath.  The patient could not to
lerate CPAP as an outpatient.  Patient missed dialysis on Thursday because of the diarrhea.



Physical Examination:

Vital Signs:  Blood pressure of 105/52, pulse of 80, and afebrile. 

Chest:  Decreased entry, bilateral base, with crackles on the left side. 

Heart:  S1, S2.  Systolic murmur. 

Abdomen:  Soft, nontender.  Morbidly obese.  Could not appreciate any organomegaly. 

Extremities:  +1 edema. 

Neurologic:  Alert.  No focality.



Laboratory Data:  Hemoglobin 9.1.  Sodium 140, potassium 4, bicarb 26, BUN 62, creatinine 8.8, calciu
m 8.6.



Current Medications:  The patient on include:

1.Midodrine 10 t.i.d.

2.Epogen.

3.Amiodarone.

4.Metoprolol 12.5 b.i.d.

5.Ibuprofen 600 t.i.d.

6.Gabapentin 300 at night.

7.Renvela.

8.Gentamicin.

9.Pantoprazole.



Assessment And Plan:  

1.End-stage renal disease, overvolume, status post dialysis yesterday.  Patient had low blood pressu
re.  I am going to try to continue dialysis on a daily basis to establish better volume control for t
he patient and we will follow up.  The patient will keep challenging.

2.Hypertension, currently blood pressure on the lower side.  Continue midodrine.  We will follow up.


3.Chronic obstructive pulmonary disease exacerbation.  We will follow up with Pulmonary.

4.Congestive heart failure exacerbation.  We will continue dialysis in a daily basis.

5.Obstructive uropathy with hydronephrosis.  Plan for stenting today.  We will follow up with Urolog
y.





MA/MODL

DD:  01/24/2023 13:07:52Voice ID:  [id number]

DT:  01/24/2023 14:08:19Report ID:  [id number] Sepsis

## 2023-01-24 NOTE — P.CNS
Primary Care Provider: Brittni


Chief Complaint: fluid overload


History of Present Illness: 





Patient is a 64-year-old female with history of CHF and ESRD presented to the ER

unresponsive and febrile. She did not know why she was here, per medical record.

She was very lethargy and had difficulty to remain alert upon ID's examination. 

Most of the information was obtained through medical records and MD notes. Per 

CT, pt has 11 mm stone proximal left ureter with moderate left hydronephrosis





ID has been consulted for fever and possible urosepsis.





Allergies





cefepime Allergy (Verified 01/21/23 04:23)


   Hives


codeine Allergy (Verified 01/21/23 04:23)


   Itching


levofloxacin Allergy (Verified 01/21/23 04:23)


   Hives/Rash


morphine Allergy (Verified 01/21/23 04:23)


   Itching


Penicillins Allergy (Verified 01/21/23 04:23)


   Hives/Rash


promethazine [From Phenergan] Allergy (Verified 01/21/23 10:03)


   Nausea/Vomiting


sulfamethoxazole [From Bactrim] Allergy (Verified 01/21/23 04:23)


   Hives


trimethoprim [From Bactrim] Allergy (Verified 01/21/23 04:23)


   Hives





Home Medications: 








Apixaban [Eliquis] 2.5 mg PO BID 07/29/22 


Bumetanide 2 tab PO DAILY 07/29/22 


Midodrine HCl 5 mg PO TID 07/29/22 


Pantoprazole [Protonix Tab*] 1 tab PO DAILY 07/29/22 


Sevelamer Carbonate [Renvela] 800 mg PO TID 07/29/22 


Amiodarone HCl [Cordarone*] 200 mg PO BID 90 Days #180 tab 12/30/22 


Alendronate Sodium 70 mg PO MO 01/04/23 


Gabapentin 300 mg PO BEDTIME 01/04/23 


Promethazine Tab [Phenergan*] 25 mg PO Q6HP PRN 30 Days #90 tab 01/05/23 


Brimonidine Tartrate [Alphagan P] 1 drop EACH EYE BID 01/15/23 


Metoprolol Tartrate [Lopressor*] 12.5 mg PO BID 01/15/23 


Minocycline HCl [Minocin] 1 tab PO BID 01/15/23 








- Past Medical/Surgical History


Diabetic: No


-: Chronic hypotension


-: Hyperlipidemia


-: Chronic anticoagulation use


-: History of nephrolithiasis requiring stent placement


-: Recurrent UTI


-: End-stage renal disease


-: CHF


-: HTN


-: A-Fib


-: tubal ligation


-: dialysis port


-: cholecystectomy


-: right broken wrist


-: lasik


-: cataracts removed


-: back surgery





- Family History


  ** Sister


Medical History: Cancer


Notes: per pt, sister has lung cancer and is being treated for a "spot on her 

brain"





  ** Father


Medical History: Lung disease





- Social History


Smoking Status: Unknown if ever smoked


Alcohol use: No


CD- Drugs: No


Caffeine use: Yes


Place of Residence: Home





Review of Systems


is unable to be obtained (patient is very lethargy and unable to obtain clear 

information)





Physical Examination














Temp Pulse Resp BP Pulse Ox


 


 97.0 F   86   18   89/50 L  98 


 


 01/23/23 16:00  01/23/23 21:00  01/23/23 16:00  01/23/23 21:00  01/23/23 16:00








General: Other (lethargy and ill-looking)


Respiratory: Clear to auscultation bilaterally


Cardiovascular: No edema, Normal S1 S2


Gastrointestinal: Normal bowel sounds


Musculoskeletal: Swelling (+1 edema lower legs)


Integumentary: Other (stasis dermatitis of LEs)


Neurological: Other (lethargy)


Urinary: Dialysis catheter








Albuterol Sulfate (Albuterol 2.5 Mg/3 Ml Neb Sol)  2.5 mg NEB [id number] PRN


   PRN Reason: WHEEZING


Amiodarone HCl (Amiodarone Hcl 200 Mg Tab)  200 mg PO BID [facility]


   Last Admin: 01/23/23 21:00 Dose:  Not Given


   


Epoetin [place] (Epoetin 4,000 Unit/Ml Vial)  4,000 unit IV EVERY HD [facility]


   Last Admin: 01/23/23 14:15 Dose:  4,000 unit


   


Folic Acid (Folic Acid 1 Mg Tablet)  1 mg PO DAILY [facility]


   Last Admin: 01/23/23 11:26 Dose:  1 mg


   


Gabapentin (Gabapentin 300 Mg Cap)  300 mg PO BEDTIME [facility]


   Last Admin: 01/23/23 21:00 Dose:  Not Given


   


Heparin Sodium (Porcine) (Heparin 1,000 Unit/Ml Vial)  4,000 unit IV EVERY HD 

PRN


   PRN Reason: DIALYSIS CATHETER CARE


   Last Admin: 01/23/23 15:02 Dose:  4,000 unit


   


Heparin Sodium (Porcine) (Heparin 1,000 Unit/Ml Vial)  0 unit IV PRN PRN


   PRN Reason: Heparin Re-Bolus Per Protocol


   Last Admin: 01/23/23 22:18 Dose:  3,000 unit


   


Home Med (Brimonidine Tartrate [Alphagan P])  1 drop OPTH BID [facility]


   Last Admin: 01/23/23 21:00 Dose:  Not Given


   


Hydromorphone HCl (Hydromorphone Hcl 0.5 Mg/0.5 Ml Inj)  0.5 mg IV Q4H PRN


   PRN Reason: Pain scale 8-10 (Severe)


   Last Admin: 01/21/23 16:20 Dose:  0.5 mg


   


Gentamicin Sulfate 190 mg/ (Sodium Chloride)  104.75 mls @ 104.75 mls/hr IVPB 

Q48H [facility]


   Last Admin: 01/22/23 23:37 Dose:  104.75 mls


   


Vancomycin HCl 1 gm/ Sodium (Chloride)  250 mls @ 250 mls/hr IVPB AFTER EACH 

DIALYSIS [facility]; Protocol


Heparin Sodium/Dextrose (Heparin Drip 25,000 Units/5oo Ml Premix)  25,000 unit 

in 500 mls @ 0 mls/hr IV UD [facility]; Protocol


   Last Admin: 01/24/23 07:12 Dose:  500 mls


   


Ibuprofen (Ibuprofen 600 Mg Tab)  600 mg PO Q6HR [facility]


   Last Admin: 01/24/23 06:00 Dose:  Not Given


   


Ipratropium Bromide (Ipratropium Brom 0.5mg/2.5ml)  0.5 mg NEB [id number] PRN


   PRN Reason: WHEEZING


Metoprolol Tartrate (Metoprolol Tar 25 Mg Tab)  12.5 mg PO BID [facility]


   Last Admin: 01/23/23 21:00 Dose:  Not Given


   


Midodrine (Midodrine Hcl 5 Mg Tablet)  10 mg PO TID [facility]


   Last Admin: 01/24/23 00:21 Dose:  10 mg


   


Ondansetron HCl (Ondansetron 4 Mg/2 Ml Vial)  8 mg IV Q8H PRN


   PRN Reason: NAUSEA / VOMITING


   Last Admin: 01/22/23 16:02 Dose:  8 mg


   


Pantoprazole Sodium (Pantoprazole 40mg Tablet)  40 mg PO DAILY [facility]; Protocol


   Last Admin: 01/23/23 09:00 Dose:  Not Given


   


Sevelamer Carbonate (Sevelamer Carbonate 800 Mg Tablet)  800 mg PO TID [facility]


   Last Admin: 01/23/23 21:00 Dose:  Not Given


   


Sodium Chloride (Flush Normal Saline 10 Ml)  10 ml IV BID [facility]


   Last Admin: 01/23/23 22:19 Dose:  10 ml


   


Tamsulosin HCl (Tamsulosin 0.4 Mg Sr Cap)  0.4 mg PO DAILY [facility]


   Last Admin: 01/23/23 11:26 Dose:  0.4 mg


   





                                  Microbiology





01/20/23 21:54   Blood  - Blood   Aerobic Blood Culture - Preliminary


                            No growth in 24 hours.


01/20/23 21:54   Blood  - Blood   Anaerobic Blood Culture - Preliminary


                            No growth in 24 hours.


01/20/23 21:48   Blood  - Blood   Aerobic Blood Culture - Preliminary


                            No growth in 24 hours.


01/20/23 21:48   Blood  - Blood   Anaerobic Blood Culture - Final








Imagings Data: 





Chest xray 1/22:


IMPRESSION: No acute lung parenchymal process suspected


Chronic right base atelectasis is again noted. There is probably a small right 

pleural effusion





CT stone protocol 1/22:


IMPRESSION: 11 mm stone proximal left ureter with moderate left hydronephrosis





CT - Head angio 1/23:


IMPRESSION: No significant flow abnormality is detected





CT - Neck Angio - 1/23:


IMPRESSION: Stenosis of less than 50% based on NASCET criteria involves the 

right carotid bulb





CT - Ct Stroke Brain Wo Cont - 1/23


IMPRESSION: No acute intracranial abnormality











- Problems


(1) Urinary tract infection


Current Visit: Yes   Status: Acute   


Plan: 





- possible urosepsis:





Cultures:


1/22 UC: Pending


1/20 Stool: Pending


1/20 Parasites: Pending


1/20 BC: Negative





Antibiotics:


- IV Gentamicin, 1/22 to


- IV Vancomycin s/p HD, 1/22 to





Recommendation:


ID agrees to continue current IV antibiotics and will provide drug of choice 

when urine culture is available








Conclusions/Impression: 





- ESRD (end stage renal disease) on dialysis: Nephrology onboard


- Left hydronephrosis due to renal stone


- Left renal stone: CT: 11 mm stone proximal left ureter with moderate left 

hydronephrosis


- Atrial fibrillation: Managed by Primary team


- CHF (congestive heart failure): Managed by primary team


- Non-compliance with renal dialysis


- Chronic hypotension


- Hyperlipidemia


- Chronic anticoagulation use


- History of nephrolithiasis requiring stent placement


- Recurrent UTI


- End-stage renal disease


- HTN


- tubal ligation


- cholecystectomy


- right broken wrist


- cataracts removed


- back surgery





ID will closely monitoring the patient for signs of infection with fever and WBC

trends








Case has been discussed with Dr. Becerra N








Thank yo Dr. Elkins for consult

## 2023-01-24 NOTE — RAD REPORT
EXAM DESCRIPTION:

RAD - Urethrocystogrphy Retrograde - 1/24/2023 3:50 pm

 

CLINICAL HISTORY:  ICD N 20.0

 

FINDINGS:  6  fluoroscopic spot images obtained. Fluoroscopy time.08 minutes

 

Left ureter was cannulated and contrast administered. Subsequently an ureteral stent was placed. Exam
ination was performed by Dr Roman

## 2023-01-25 LAB
ALBUMIN SERPL BCP-MCNC: 3.1 G/DL (ref 3.4–5)
ALP SERPL-CCNC: 120 U/L (ref 45–117)
ALT SERPL W P-5'-P-CCNC: 17 U/L (ref 13–56)
AST SERPL W P-5'-P-CCNC: 15 U/L (ref 15–37)
BUN BLD-MCNC: 59 MG/DL (ref 7–18)
COHGB MFR BLDA: 2 % (ref 0–1.5)
GLUCOSE SERPLBLD-MCNC: 141 MG/DL (ref 74–106)
HCT VFR BLD CALC: 28.3 % (ref 36–45)
LYMPHOCYTES # SPEC AUTO: 0.5 K/UL (ref 0.7–4.9)
MCV RBC: 99.4 FL (ref 80–100)
OXYHGB MFR BLDA: 89 % (ref 94–97)
PMV BLD: 8.8 FL (ref 7.6–11.3)
POTASSIUM SERPL-SCNC: 3.3 MMOL/L (ref 3.5–5.1)
RBC # BLD: 2.85 M/UL (ref 3.86–4.86)
SAO2 % BLDA: 92.1 % (ref 92–98.5)

## 2023-01-25 PROCEDURE — 5A1955Z RESPIRATORY VENTILATION, GREATER THAN 96 CONSECUTIVE HOURS: ICD-10-PCS

## 2023-01-25 PROCEDURE — 0BH17EZ INSERTION OF ENDOTRACHEAL AIRWAY INTO TRACHEA, VIA NATURAL OR ARTIFICIAL OPENING: ICD-10-PCS

## 2023-01-25 RX ADMIN — GABAPENTIN SCH MG: 300 CAPSULE ORAL at 21:07

## 2023-01-25 RX ADMIN — EPOETIN ALFA-EPBX SCH UNIT: 4000 INJECTION, SOLUTION INTRAVENOUS; SUBCUTANEOUS at 21:45

## 2023-01-25 RX ADMIN — SEVELAMER CARBONATE SCH: 800 TABLET, FILM COATED ORAL at 09:00

## 2023-01-25 RX ADMIN — MIDODRINE HYDROCHLORIDE SCH MG: 5 TABLET ORAL at 13:18

## 2023-01-25 RX ADMIN — SEVELAMER CARBONATE SCH: 800 TABLET, FILM COATED ORAL at 13:18

## 2023-01-25 RX ADMIN — Medication SCH ML: at 21:08

## 2023-01-25 RX ADMIN — HALOPERIDOL LACTATE PRN MG: 5 INJECTION, SOLUTION INTRAMUSCULAR at 20:27

## 2023-01-25 RX ADMIN — MIDODRINE HYDROCHLORIDE SCH MG: 5 TABLET ORAL at 21:07

## 2023-01-25 RX ADMIN — HYDROMORPHONE HYDROCHLORIDE PRN MG: 1 INJECTION, SOLUTION INTRAMUSCULAR; INTRAVENOUS; SUBCUTANEOUS at 06:00

## 2023-01-25 RX ADMIN — Medication SCH: at 09:00

## 2023-01-25 RX ADMIN — IBUPROFEN SCH: 600 TABLET ORAL at 06:00

## 2023-01-25 RX ADMIN — FENTANYL CITRATE PRN MCG: 50 INJECTION, SOLUTION INTRAMUSCULAR; INTRAVENOUS at 23:01

## 2023-01-25 RX ADMIN — AMIODARONE HYDROCHLORIDE SCH MG: 200 TABLET ORAL at 09:34

## 2023-01-25 RX ADMIN — MIDODRINE HYDROCHLORIDE SCH MG: 5 TABLET ORAL at 09:32

## 2023-01-25 RX ADMIN — SEVELAMER CARBONATE SCH: 800 TABLET, FILM COATED ORAL at 21:00

## 2023-01-25 RX ADMIN — FENTANYL CITRATE PRN MCG: 50 INJECTION, SOLUTION INTRAMUSCULAR; INTRAVENOUS at 06:35

## 2023-01-25 RX ADMIN — IBUPROFEN SCH: 600 TABLET ORAL at 12:00

## 2023-01-25 RX ADMIN — FOLIC ACID SCH MG: 1 TABLET ORAL at 09:33

## 2023-01-25 RX ADMIN — HYDROMORPHONE HYDROCHLORIDE PRN MG: 1 INJECTION, SOLUTION INTRAMUSCULAR; INTRAVENOUS; SUBCUTANEOUS at 09:32

## 2023-01-25 RX ADMIN — AMIODARONE HYDROCHLORIDE SCH MG: 200 TABLET ORAL at 21:07

## 2023-01-25 RX ADMIN — METOPROLOL TARTRATE SCH: 25 TABLET ORAL at 09:00

## 2023-01-25 RX ADMIN — PANTOPRAZOLE SODIUM SCH MG: 40 GRANULE, DELAYED RELEASE ORAL at 09:34

## 2023-01-25 RX ADMIN — METOPROLOL TARTRATE SCH: 25 TABLET ORAL at 21:00

## 2023-01-25 RX ADMIN — Medication SCH ML: at 09:34

## 2023-01-25 RX ADMIN — PANTOPRAZOLE SODIUM SCH: 40 TABLET, DELAYED RELEASE ORAL at 09:00

## 2023-01-25 RX ADMIN — Medication SCH: at 19:31

## 2023-01-25 RX ADMIN — HEPARIN SODIUM PRN UNIT: 1000 INJECTION, SOLUTION INTRAVENOUS; SUBCUTANEOUS at 23:02

## 2023-01-25 NOTE — PN
Date of Progress Note:  01/25/2023



Subjective:  The patient was admitted with respiratory failure, poor compliant with dialysis.  The pa
rashid today developed hypercapnic respiratory failure, bradycardic.  The patient intubated.



Physical Examination:

Vital Signs:  When I saw the patient; blood pressure 101/71, pulse of 88, afebrile. 

Chest:  Crackles bilateral. 

Heart:  S1, S2.  Systolic murmur. 

Abdomen:  Soft, nontender. 

Extremities:  Plus edema. 

Neuro:  Alert.  No focality.



Laboratory Data:  Hemoglobin 8.8.  Sodium 138, potassium 3.3, bicarb 23, BUN 59, creatinine 8.5, calc
ium 8.2.



Current Medications:  The patient on include;

1.Meropenem.

2.Vancomycin.

3.Midodrine.

4.Heparin.

5.Amiodarone.

6.Metoprolol 12.5.

7.Renvela.

8.Ipratropium.

9.Zofran.

10.Fentanyl.



Assessment And Plan:  

1.End-stage renal disease, over volume with respiratory failure, on vent.  I am going to go ahead an
d do dialysis today.  We will dialyze the patient on sodium module and low temperature.  We will use 
midodrine for blood pressure support with albumin and we will dialyze the patient on high potassium b
ath given the current potassium and we will follow up.

2.Hypertension, currently hypotension.  Keep holding all blood pressure medications except metoprolo
l for rate control.  Continue midodrine.

3.Respiratory failure secondary to hypercapnic respiratory failure/over volume.  We will challenge t
he patient today.

4.Hypokalemia.  The patient is going to be dialyzed on high potassium bath.

5.Hypercapnic respiratory failure with bradycardia as by Pulmonary.





MA/MODL

DD:  01/25/2023 11:36:44Voice ID:  [id number]

DT:  01/25/2023 11:47:56Report ID:  [id number]

## 2023-01-25 NOTE — P.PN
Subjective


Date of Service: 01/25/23


Primary Care Provider: Brittni


Chief Complaint: fluid overload





Patient coded secondary to rishabh in the morning and was transferred to ICU. 

Alert and was able to nod head to answer questions. ETT in place.








Physical Examination





- Vital Signs


Temperature: 98.5 F


Blood Pressure: 87/59


Pulse: 112


Respirations: 25


Pulse Ox (%): 97





- Physical Exam


General: Alert, Oriented x3


Respiratory: Crackles/rales, Other (on vent support)


Cardiovascular: Irregular heart rate/rhythm


Gastrointestinal: Normal bowel sounds


Musculoskeletal: Other (stasis dermatitis)


Integumentary: Other (stasis dermatitis)


Neurological: Normal affect





- Studies








Albuterol Sulfate (Albuterol 2.5 Mg/3 Ml Neb Sol)  2.5 mg NEB [id number] PRN


   PRN Reason: WHEEZING


Amiodarone HCl (Amiodarone Hcl 200 Mg Tab)  200 mg PO BID Replaced by [facility]


   Last Admin: 01/24/23 20:08 Dose:  200 mg


   


Epoetin [place] (Epoetin 4,000 Unit/Ml Vial)  4,000 unit IV EVERY HD Replaced by [facility]


   Last Admin: 01/23/23 14:15 Dose:  4,000 unit


   


Fentanyl Citrate (Fentanyl Citr 100 Mcg/2 Ml)  25 mcg IV Q4HP PRN


   PRN Reason: Pain scale 8-10 (Severe)


   Last Admin: 01/25/23 06:35 Dose:  25 mcg


   


Folic Acid (Folic Acid 1 Mg Tablet)  1 mg PO DAILY Replaced by [facility]


   Last Admin: 01/24/23 09:00 Dose:  Not Given


   


Gabapentin (Gabapentin 300 Mg Cap)  300 mg PO BEDTIME Replaced by [facility]


   Last Admin: 01/24/23 20:08 Dose:  300 mg


   


Haloperidol Lactate (Haloperidol Lact 5 Mg/Ml Inj)  2 mg IV Q4HP PRN


   PRN Reason: AGITATION


Heparin Sodium (Porcine) (Heparin 1,000 Unit/Ml Vial)  4,000 unit IV EVERY HD 

PRN


   PRN Reason: DIALYSIS CATHETER CARE


   Last Admin: 01/23/23 15:02 Dose:  4,000 unit


   


Home Med (Brimonidine Tartrate [Alphagan P])  1 drop OPTH BID Replaced by [facility]


   Last Admin: 01/24/23 20:08 Dose:  Not Given


   


Hydromorphone HCl (Hydromorphone Hcl 0.5 Mg/0.5 Ml Inj)  0.5 mg IV Q4H PRN


   PRN Reason: Pain scale 8-10 (Severe)


   Last Admin: 01/25/23 06:00 Dose:  0.5 mg


   


Vancomycin HCl 1 gm/ Sodium (Chloride)  250 mls @ 250 mls/hr IVPB AFTER EACH 

DIALYSIS Replaced by [facility]; Protocol


Gentamicin Sulfate 190 mg/ (Sodium Chloride)  104.75 mls @ 104.75 mls/hr IVPB 

AFTER EACH DIALYSIS Replaced by [facility]


Meropenem 500 mg/ Sodium (Chloride)  100 mls @ 200 mls/hr IV DAILY 6PM Replaced by [facility]


   Last Admin: 01/24/23 20:07 Dose:  100 mls


   


Propofol (Diprivan)  1,000 mg in 100 mls @ 6.559 mls/hr IV TITR Replaced by [facility]; Protocol


   Last Titration: 01/25/23 06:48 Dose:  0 mcg/kg/min, 0 mls/hr


   


Sodium Chloride (Sodium Chloride)  250 mls @ 999 mls/hr IV Q15M PRN


   PRN Reason: HYPOTENSION


   Last Admin: 01/25/23 06:20 Dose:  250 mls


   


Ibuprofen (Ibuprofen 600 Mg Tab)  600 mg PO Q6HR Replaced by [facility]


   Last Admin: 01/25/23 06:00 Dose:  Not Given


   


Ipratropium Bromide (Ipratropium Brom 0.5mg/2.5ml)  0.5 mg NEB [id number] PRN


   PRN Reason: WHEEZING


Lorazepam (Lorazepam 2 Mg/Ml Vial)  2 mg IV Q2HP PRN


   PRN Reason: Sedation-Propofol not effect


Metoprolol Tartrate (Metoprolol Tar 25 Mg Tab)  12.5 mg PO BID Replaced by [facility]


   Last Admin: 01/24/23 20:08 Dose:  Not Given


   


Midazolam HCl (Midazolam Hcl 2 Mg/2 Ml Inj)  2 mg IV Q2HP PRN


   PRN Reason: Sedation-Propofol not effect


Midodrine (Midodrine Hcl 5 Mg Tablet)  10 mg PO TID Replaced by [facility]


   Last Admin: 01/24/23 20:08 Dose:  10 mg


   


Ondansetron HCl (Ondansetron 4 Mg/2 Ml Vial)  8 mg IV Q8H PRN


   PRN Reason: NAUSEA / VOMITING


   Last Admin: 01/24/23 21:23 Dose:  8 mg


   


Pantoprazole Sodium (Pantoprazole 40mg Tablet)  40 mg PO DAILY Replaced by [facility]; Protocol


   Last Admin: 01/24/23 09:00 Dose:  Not Given


   


Sevelamer Carbonate (Sevelamer Carbonate 800 Mg Tablet)  800 mg PO TID Replaced by [facility]


   Last Admin: 01/24/23 20:09 Dose:  Not Given


   


Sodium Chloride (Flush Normal Saline 10 Ml)  10 ml IV BID Replaced by [facility]


   Last Admin: 01/24/23 20:08 Dose:  10 ml


   








Microbiology Data (last 24 hrs): 


                                  Microbiology





01/20/23 21:54   Blood  - Blood   Aerobic Blood Culture - Preliminary


                            No growth in 24 hours.


01/20/23 21:54   Blood  - Blood   Anaerobic Blood Culture - Preliminary


                            No growth in 24 hours.


01/20/23 21:48   Blood  - Blood   Aerobic Blood Culture - Preliminary


                            No growth in 24 hours.


01/20/23 21:48   Blood  - Blood   Anaerobic Blood Culture - Final











Assessment And Plan





- Current Problems (Diagnosis)


(1) Urinary tract infection


Current Visit: Yes   Status: Acute   


Plan: 





- possible urosepsis:





Cultures:


1/24 BC: Pending


1/22 UC: Negative


1/20 Stool: Pending


1/20 Parasites: Pending


1/20 BC: Negative





Antibiotics:


- IV Gentamicin, 1/22 to 1/25


- IV Vancomycin s/p HD, 1/22 to


- Dr. Roman added IV Meropenem, 1/24 to





Recommendation:


Stop Gentamicin





ID agrees to continue current IV antibiotics and will provide drug of choice 

when urine culture is available





Left renal stone: CT: 11 mm stone proximal left ureter with moderate left 

hydronephrosis. Pus seen on stenting 











- Plan





- ESRD (end stage renal disease) on dialysis: Nephrology onboard


- Left hydronephrosis due to renal stone


- Left renal stone: CT: 11 mm stone proximal left ureter with moderate left 

hydronephrosis. Pus seen on stenting 


- Atrial fibrillation: Managed by Primary team


- CHF (congestive heart failure): Managed by primary team


- Non-compliance with renal dialysis


- Chronic hypotension


- Hyperlipidemia


- Chronic anticoagulation use


- History of nephrolithiasis requiring stent placement


- Recurrent UTI


- End-stage renal disease


- HTN


- tubal ligation


- cholecystectomy


- right broken wrist


- cataracts removed


- back surgery





ID will closely monitoring the patient for signs of infection with fever and WBC

trends








Case has been discussed with Dr. Becerra, N





Physician Review: Patient Assessed, Agree with Above Assessment and Plan

## 2023-01-25 NOTE — P.CNS
Date of Consult: 01/25/23


Reason for Consult: Respiratory failure


Primary Care Provider: Brittni


Chief Complaint: Respiratory failure


History of Present Illness: 





Patient is 67 years of age admitted with left-sided hydronephrosis s/p stent 

placement developed agitation and is currently on a ventilator patient has end-

stage renal disease on dialysis


Allergies





cefepime Allergy (Verified 01/21/23 04:23)


   Hives


codeine Allergy (Verified 01/21/23 04:23)


   Itching


levofloxacin Allergy (Verified 01/21/23 04:23)


   Hives/Rash


morphine Allergy (Verified 01/21/23 04:23)


   Itching


Penicillins Allergy (Verified 01/21/23 04:23)


   Hives/Rash


promethazine [From Phenergan] Allergy (Verified 01/21/23 10:03)


   Nausea/Vomiting


sulfamethoxazole [From Bactrim] Allergy (Verified 01/21/23 04:23)


   Hives


trimethoprim [From Bactrim] Allergy (Verified 01/21/23 04:23)


   Hives





Home Medications: 








Apixaban [Eliquis] 2.5 mg PO BID 07/29/22 


Bumetanide 2 tab PO DAILY 07/29/22 


Midodrine HCl 5 mg PO TID 07/29/22 


Pantoprazole [Protonix Tab*] 1 tab PO DAILY 07/29/22 


Sevelamer Carbonate [Renvela] 800 mg PO TID 07/29/22 


Amiodarone HCl [Cordarone*] 200 mg PO BID 90 Days #180 tab 12/30/22 


Alendronate Sodium 70 mg PO MO 01/04/23 


Gabapentin 300 mg PO BEDTIME 01/04/23 


Promethazine Tab [Phenergan*] 25 mg PO Q6HP PRN 30 Days #90 tab 01/05/23 


Brimonidine Tartrate [Alphagan P] 1 drop EACH EYE BID 01/15/23 


Metoprolol Tartrate [Lopressor*] 12.5 mg PO BID 01/15/23 


Minocycline HCl [Minocin] 1 tab PO BID 01/15/23 








- Past Medical/Surgical History


Diabetic: No


-: Chronic hypotension


-: Hyperlipidemia


-: Chronic anticoagulation use


-: History of nephrolithiasis requiring stent placement


-: Recurrent UTI


-: End-stage renal disease


-: CHF


-: HTN


-: A-Fib


-: tubal ligation


-: dialysis port


-: cholecystectomy


-: right broken wrist


-: lasik


-: cataracts removed


-: back surgery





- Family History


  ** Sister


Medical History: Cancer


Notes: per pt, sister has lung cancer and is being treated for a "spot on her 

brain"





  ** Father


Medical History: Lung disease





- Social History


Smoking Status: Unknown if ever smoked


Alcohol use: No


CD- Drugs: No


Caffeine use: Yes


Place of Residence: Home





Review of Systems


is unable to be obtained





Physical Examination














Temp Pulse Resp BP Pulse Ox


 


 98.5 F   112 H  25 H  87/59 L  97 


 


 01/25/23 11:17  01/25/23 11:17  01/25/23 11:17  01/25/23 11:17  01/25/23 11:17








General: Alert, Moderate distress


Respiratory: Clear to auscultation bilaterally, Diminished


Cardiovascular: No edema, Regular rate/rhythm, Normal S1 S2


Gastrointestinal: Normal bowel sounds, Soft and benign





- Problems


(1) Respiratory failure


Current Visit: Yes   Status: Acute   


Plan: 


Patient is 67 years of age admitted with left-sided hydronephrosis had a stent 

placed developed respiratory distress is currently intubated she has end-stage 

renal disease follow-up acute respiratory acidosis patient is chronically 

elevated right hemidiaphragm recent echocardiogram in August 22 shows normal 

left ventricular function moderate TR and MR so far blood cultures are negative 

we will change vent to pressure control dexmedetomidine


Qualifiers: 


   Chronicity: acute Patient/Caregiver provided printed discharge information.

## 2023-01-25 NOTE — RAD REPORT
EXAM DESCRIPTION:  RAD - Chest Single View - 1/25/2023 6:05 am

 

CLINICAL HISTORY:  The patient is 67 years old and is Female; ET TUBE placement

 

TECHNIQUE:  Frontal view of the chest.

 

COMPARISON:  1/5/2022 chest

 

FINDINGS:  LUNGS:    Right hemidiaphragm elevation with right basilar opacities and right-sided pleur
al effusion.

         No focal consolidation within the left lung.

PLEURAL SPACE:    No pneumothorax or pneumomediastinum.

HEART:    Unremarkable.   No cardiomegaly.

MEDIASTINUM:    Allowing for leftward patient rotation, stable enlargement of the cardiomediastinal s
ilhouette.

BONES/JOINTS:    Unremarkable.

TUBES, LINES AND DEVICES:    The endotracheal tube (ETT) is in satisfactory position with tip 1.2 cm 
above the carlos.

         Enteric tube tip terminates below the inferior margin of the image.

         Left approach CVC tip terminates in the right atrium.

         Loop recorder noted over the left chest.

 

IMPRESSION:  1.   Right hemidiaphragm elevation with right basilar opacities and right-sided pleural 
effusion.

2.   Support devices as above.

 

Electronically signed by:   Ac Yeung MD   1/25/2023 6:21 AM CST Workstation: [id number]

 

 

 

Due to temporary technical issues with the PACS/Fluency reporting system, reports are being signed by
 the in house radiologists without review as a courtesy to insure prompt reporting. The interpreting 
radiologist is fully responsible for the content of the report.

## 2023-01-25 NOTE — RAD REPORT
EXAM DESCRIPTION:  RAD - Abdomen 1 View (KUB) - 1/25/2023 6:51 am

 

CLINICAL HISTORY:   Device placement the nasogastric tube placement

 

FINDINGS:   The tip of a nasogastric tube tube lies within the distal stomach.

## 2023-01-25 NOTE — P.PN
Subjective


Date of Service: 01/25/23


Primary Care Provider: Brittni


Chief Complaint: fluid overload





Patient was non compliant with her cpap early this morning.  Became hypoxic and 

bradycardic and was intubated 





Review of Systems


is unable to be obtained





Physical Examination





- Vital Signs


Temperature: 98.5 F


Blood Pressure: 87/59


Pulse: 112


Respirations: 25


Pulse Ox (%): 97





- Physical Exam


General: Moderate distress (patient is awake  Cannont speak due to the ET tube)





Assessment And Plan





- Current Problems (Diagnosis)


(1) Left renal stone


Current Visit: Yes   Status: Chronic   


Plan: 


consult to Dr. FADY Roman.  He most likely will see her Monday night or Tuesday 

morning.  Will start her on flomax and work on pain management 


plans for stenting today 


1/25


Patient had puss on stenting.  Was sent to the ICU.  Added meropenem to the 

vancomycin. 








(2) ESRD (end stage renal disease) on dialysis


Current Visit: No   Status: Chronic   


Plan: 


restart dialysis with Dr. Medrano's group. 








(3) Atrial fibrillation


Current Visit: No   Status: Acute   


Plan: 


continue amiodarone and metoprolol 


Qualifiers: 


 





(4) CHF (congestive heart failure)


Current Visit: No   Status: Chronic   


Plan: 


will continue metoprolol.  She should be on a nocturnal cpap for her sleep 

apnea.  However the patient is not compliant.  She comes in several times and 

requires positive pressure ventilation.   She take it off as soon as she wakes 

up and refuses it.  


Qualifiers: 


   Heart failure type: diastolic   Heart failure chronicity: chronic   Qualified

Code(s): I50.32 - Chronic diastolic (congestive) heart failure   





(5) Respiratory failure


Current Visit: Yes   Status: Acute   


Plan: 


consult to Dr. Joshi.   She is on the vent protochol


Qualifiers: 


   Chronicity: acute   Respiratory failure complication: hypoxia   Qualified 

Code(s): J96.01 - Acute respiratory failure with hypoxia   





(6) Severe sepsis


Current Visit: Yes   Status: Acute   


Plan: 


continue Vanc and meropenem.  Cultures pending 





Discharge Plan: Home


Plan to discharge in: Greater than 2 days


Physician Review: Patient Assessed, Agree with Above Assessment and Plan


Critical Care: Yes


Time Spent Managing PTS Care (In Minutes): 25

## 2023-01-26 LAB
ALBUMIN SERPL BCP-MCNC: 3.1 G/DL (ref 3.4–5)
ALP SERPL-CCNC: 103 U/L (ref 45–117)
ALT SERPL W P-5'-P-CCNC: 16 U/L (ref 13–56)
AST SERPL W P-5'-P-CCNC: 14 U/L (ref 15–37)
BUN BLD-MCNC: 38 MG/DL (ref 7–18)
COHGB MFR BLDA: 1.9 % (ref 0–1.5)
GLUCOSE SERPLBLD-MCNC: 95 MG/DL (ref 74–106)
HBV SURFACE AB SER-ACNC: < 3.1 MIU/ML (ref ?–8)
HCT VFR BLD CALC: 25 % (ref 36–45)
LYMPHOCYTES # SPEC AUTO: 0.4 K/UL (ref 0.7–4.9)
MCV RBC: 98.8 FL (ref 80–100)
OXYHGB MFR BLDA: 89.3 % (ref 94–97)
PMV BLD: 8 FL (ref 7.6–11.3)
POTASSIUM SERPL-SCNC: 3.4 MMOL/L (ref 3.5–5.1)
RBC # BLD: 2.53 M/UL (ref 3.86–4.86)
SAO2 % BLDA: 92.3 % (ref 92–98.5)

## 2023-01-26 PROCEDURE — 02HV33Z INSERTION OF INFUSION DEVICE INTO SUPERIOR VENA CAVA, PERCUTANEOUS APPROACH: ICD-10-PCS

## 2023-01-26 RX ADMIN — METHYLPREDNISOLONE SODIUM SUCCINATE SCH MG: 40 INJECTION, POWDER, FOR SOLUTION INTRAMUSCULAR; INTRAVENOUS at 09:46

## 2023-01-26 RX ADMIN — SODIUM CHLORIDE SCH MLS: 9 INJECTION, SOLUTION INTRAVENOUS at 00:01

## 2023-01-26 RX ADMIN — Medication SCH: at 20:38

## 2023-01-26 RX ADMIN — Medication SCH ML: at 07:45

## 2023-01-26 RX ADMIN — SEVELAMER CARBONATE SCH MG: 800 TABLET, FILM COATED ORAL at 20:37

## 2023-01-26 RX ADMIN — ARFORMOTEROL TARTRATE SCH MCG: 15 SOLUTION RESPIRATORY (INHALATION) at 20:00

## 2023-01-26 RX ADMIN — MIDODRINE HYDROCHLORIDE SCH MG: 5 TABLET ORAL at 20:37

## 2023-01-26 RX ADMIN — AMIODARONE HYDROCHLORIDE SCH MG: 200 TABLET ORAL at 07:41

## 2023-01-26 RX ADMIN — FENTANYL CITRATE PRN MCG: 50 INJECTION, SOLUTION INTRAMUSCULAR; INTRAVENOUS at 22:16

## 2023-01-26 RX ADMIN — Medication SCH ML: at 20:37

## 2023-01-26 RX ADMIN — FOLIC ACID SCH MG: 1 TABLET ORAL at 07:41

## 2023-01-26 RX ADMIN — METHYLPREDNISOLONE SODIUM SUCCINATE SCH MG: 40 INJECTION, POWDER, FOR SOLUTION INTRAMUSCULAR; INTRAVENOUS at 20:37

## 2023-01-26 RX ADMIN — MIDAZOLAM PRN MG: 1 INJECTION INTRAMUSCULAR; INTRAVENOUS at 08:21

## 2023-01-26 RX ADMIN — FENTANYL CITRATE PRN MCG: 50 INJECTION, SOLUTION INTRAMUSCULAR; INTRAVENOUS at 16:29

## 2023-01-26 RX ADMIN — HEPARIN SODIUM PRN UNIT: 1000 INJECTION, SOLUTION INTRAVENOUS; SUBCUTANEOUS at 19:48

## 2023-01-26 RX ADMIN — GABAPENTIN SCH MG: 300 CAPSULE ORAL at 20:37

## 2023-01-26 RX ADMIN — AMIODARONE HYDROCHLORIDE SCH MG: 200 TABLET ORAL at 20:38

## 2023-01-26 RX ADMIN — Medication SCH: at 07:46

## 2023-01-26 RX ADMIN — SEVELAMER CARBONATE SCH: 800 TABLET, FILM COATED ORAL at 07:41

## 2023-01-26 RX ADMIN — MIDODRINE HYDROCHLORIDE SCH MG: 5 TABLET ORAL at 13:18

## 2023-01-26 RX ADMIN — METOPROLOL TARTRATE SCH: 25 TABLET ORAL at 07:46

## 2023-01-26 RX ADMIN — MIDODRINE HYDROCHLORIDE SCH MG: 5 TABLET ORAL at 07:41

## 2023-01-26 RX ADMIN — METOPROLOL TARTRATE SCH: 25 TABLET ORAL at 20:09

## 2023-01-26 RX ADMIN — SEVELAMER CARBONATE SCH: 800 TABLET, FILM COATED ORAL at 13:18

## 2023-01-26 RX ADMIN — PANTOPRAZOLE SODIUM SCH MG: 40 GRANULE, DELAYED RELEASE ORAL at 07:45

## 2023-01-26 RX ADMIN — ARFORMOTEROL TARTRATE SCH MCG: 15 SOLUTION RESPIRATORY (INHALATION) at 13:37

## 2023-01-26 RX ADMIN — EPOETIN ALFA-EPBX SCH UNIT: 4000 INJECTION, SOLUTION INTRAVENOUS; SUBCUTANEOUS at 19:30

## 2023-01-26 RX ADMIN — MIDAZOLAM PRN MG: 1 INJECTION INTRAMUSCULAR; INTRAVENOUS at 17:29

## 2023-01-26 RX ADMIN — FENTANYL CITRATE PRN MCG: 50 INJECTION, SOLUTION INTRAMUSCULAR; INTRAVENOUS at 07:39

## 2023-01-26 NOTE — P.PN
Subjective


Date of Service: 01/26/23


Primary Care Provider: Brittni


Chief Complaint: Respiratory failure





No change in patient's condition she continues to remain agitated did not 

tolerate dexmedetomidine or propofol still tachycardic on amiodarone drip





Review of Systems


is unable to be obtained





Physical Examination





- Vital Signs


Temperature: 97.6 F


Blood Pressure: 90/60


Pulse: 101


Respirations: 22


Pulse Ox (%): 98





- Physical Exam


General: Unresponsive


Respiratory: Diminished


Cardiovascular: No edema, Regular rate/rhythm, Normal S1 S2





- Studies


Microbiology Data (last 24 hrs): 








01/20/23 21:54   Blood  - Blood   Aerobic Blood Culture - Final


                            No growth in 5 days.


01/20/23 21:54   Blood  - Blood   Anaerobic Blood Culture - Final


                            No growth in 5 days.


01/20/23 21:48   Blood  - Blood   Aerobic Blood Culture - Final


                            No growth in 5 days.


01/20/23 21:48   Blood  - Blood   Anaerobic Blood Culture - Final








Assessment And Plan





- Current Problems (Diagnosis)


(1) Respiratory failure


Current Visit: Yes   Status: Acute   


Plan: 


Patient admitted with respiratory failure still requiring high concentrations of

oxygen we will plan to titrate to a sat of 90% add bronchodilators Brovana add a

trial of steroids doubt sepsis we will check a procalcitonin level patient is on

vancomycin and meropenem White count is normal cultures negative antibiotics can

be DC'd chest x-ray reviewed is appears to be clear endotracheal tube 

satisfactory position blood gases reviewed hypoxic hypercapnic


Qualifiers: 


   Chronicity: acute 


Physician Review: Patient Assessed, Agree with Above Assessment and Plan

## 2023-01-26 NOTE — PN
Date of Progress Note:  01/26/2023



Subjective:  The patient was admitted with hypercapnic respiratory failure.  The patient had bradycar
cali.  The patient was intubated.  Yesterday had dialysis, we managed to remove 1600.



Physical Examination:

Vital Signs:  When I showed the patient; blood pressure 90/60, pulse of 101, afebrile. 

Chest:  Decreased entry bilateral base. 

Heart:  S1, S2.  Systolic murmur. 

Abdomen:  Soft, nontender. 

Extremities:  +1 edema. 

Neurologic:  Alert.  Sleepy.



Laboratory Data:  Hemoglobin 7.8.  Chest x-ray, cardiomegaly with congestion, compared to previous 
est x-ray much better.  The patient's FiO2 requirement down to 65.  Sodium 140, potassium 3.4, bicarb
 27, BUN 38, creatinine 6.7, calcium 8.5.



Current Medications:  The patient on include;

1.Midodrine.

2.Epogen.

3.Amiodarone.

4.Metoprolol.

5.Folic acid.

6.Renvela.



Assessment And Plan:  

1.End-stage renal disease, over volume.  I am going to do another session of dialysis today just seq
uential and we will follow up the patient.

2.Hypertension, currently hypotension.  We will utilize blood pressure for more ultrafiltration.

3.Anemia of chronic kidney disease.  Continue LENARD.

4.Secondary hyperparathyroidism.  Continue Renvela.

5.Respiratory failure, multifactorial, secondary to chronic obstructive pulmonary disease exacerbati
on/over volume.  We will do extra dialysis today and we will follow up. 

Time spent evaluating the patient, reviewing data, lab and radiology, placing order, discussing the c
ase with the team member including nursing and ICU and hospitalist more than 35 minutes.





KHUSHI

DD:  01/26/2023 10:15:50Voice ID:  [id number]

DT:  01/26/2023 10:34:40Report ID:  [id number]

## 2023-01-26 NOTE — P.PN
Subjective


Date of Service: 01/26/23


Primary Care Provider: Brittni


Chief Complaint: Respiratory failure





Patient lying in bed on Vent support with ETT in place with  at the 

bedside. Alert and was able to nod head to answer questions. Current on 65% of 

FiO2








Physical Examination





- Vital Signs


Temperature: 97.6 F


Blood Pressure: 90/60


Pulse: 101


Respirations: 22


Pulse Ox (%): 98





- Physical Exam


General: Alert, In no apparent distress, Oriented x3


Respiratory: Crackles/rales, Other (on vent support with ETT in place)


Cardiovascular: Irregular heart rate/rhythm


Gastrointestinal: Normal bowel sounds


Musculoskeletal: Other (stasis dermatitis)


Integumentary: Other (stasis dermatitis)


Neurological: Normal affect, Other (ETT in place)





- Studies








Albuterol Sulfate (Albuterol 2.5 Mg/3 Ml Neb Sol)  2.5 mg NEB [id number] PRN


   PRN Reason: WHEEZING


Amiodarone HCl (Amiodarone Hcl 200 Mg Tab)  200 mg PO BID [facility]


   Last Admin: 01/26/23 07:41 Dose:  200 mg


   


Epoetin [place] (Epoetin 4,000 Unit/Ml Vial)  4,000 unit IV EVERY HD [facility]


   Last Admin: 01/25/23 21:45 Dose:  4,000 unit


   


Fentanyl Citrate (Fentanyl Citr 100 Mcg/2 Ml)  25 mcg IV Q4HP PRN


   PRN Reason: Pain scale 8-10 (Severe)


   Last Admin: 01/26/23 07:39 Dose:  25 mcg


   


Folic Acid (Folic Acid 1 Mg Tablet)  1 mg PO DAILY [facility]


   Last Admin: 01/26/23 07:41 Dose:  1 mg


   


Gabapentin (Gabapentin 300 Mg Cap)  300 mg PO BEDTIME [facility]


   Last Admin: 01/25/23 21:07 Dose:  300 mg


   


Haloperidol Lactate (Haloperidol Lact 5 Mg/Ml Inj)  2 mg IV Q4HP PRN


   PRN Reason: AGITATION


   Last Admin: 01/25/23 20:27 Dose:  2 mg


   


Heparin Sodium (Porcine) (Heparin 1,000 Unit/Ml Vial)  4,000 unit IV EVERY HD 

PRN


   PRN Reason: DIALYSIS CATHETER CARE


   Last Admin: 01/25/23 23:02 Dose:  4,000 unit


   


Home Med (Brimonidine Tartrate [Alphagan P])  1 drop OPTH BID [facility]


   Last Admin: 01/26/23 07:46 Dose:  Not Given


   


Meropenem 500 mg/ Sodium (Chloride)  100 mls @ 200 mls/hr IV DAILY 6PM [facility]


   Last Admin: 01/26/23 00:01 Dose:  100 mls


   


Sodium Chloride (Sodium Chloride)  250 mls @ 999 mls/hr IV Q15M PRN


   PRN Reason: HYPOTENSION


   Last Admin: 01/25/23 06:20 Dose:  250 mls


   


Vancomycin HCl 1 gm/ Sodium (Chloride)  250 mls @ 250 mls/hr IVPB AFTER EACH 

DIALYSIS [facility]; Protocol


Dexmedetomidine HCl 1,000 mcg/ (Sodium Chloride)  500 mls @ 10.932 mls/hr IV 

TITR [facility]; Protocol


   Last Titration: 01/25/23 14:44 Dose:  0 mcg/kg/hr, 0 mls/hr


   


Ipratropium Bromide (Ipratropium Brom 0.5mg/2.5ml)  0.5 mg NEB [id number] PRN


   PRN Reason: WHEEZING


Lorazepam (Lorazepam 2 Mg/Ml Vial)  2 mg IV Q2HP PRN


   PRN Reason: Sedation-Propofol not effect


   Last Admin: 01/26/23 04:20 Dose:  2 mg


   


Metoprolol Tartrate (Metoprolol Tar 25 Mg Tab)  12.5 mg PO BID [facility]


   Last Admin: 01/26/23 07:46 Dose:  Not Given


   


Midazolam HCl (Midazolam Hcl 2 Mg/2 Ml Inj)  2 mg IV Q2HP PRN


   PRN Reason: Sedation-Propofol not effect


Midodrine (Midodrine Hcl 5 Mg Tablet)  10 mg PO TID [facility]


   Last Admin: 01/26/23 07:41 Dose:  10 mg


   


Ondansetron HCl (Ondansetron 4 Mg/2 Ml Vial)  8 mg IV Q8H PRN


   PRN Reason: NAUSEA / VOMITING


   Last Admin: 01/24/23 21:23 Dose:  8 mg


   


Pantoprazole Sodium (Pantoprazole (Granules) 40 Mg/Blist Packet)  40 mg FT DAILY

[facility]


   Last Admin: 01/26/23 07:45 Dose:  40 mg


   


Sevelamer Carbonate (Sevelamer Carbonate 800 Mg Tablet)  800 mg PO TID [facility]


   Last Admin: 01/26/23 07:41 Dose:  Not Given


   


Sodium Chloride (Flush Normal Saline 10 Ml)  10 ml IV BID [facility]


   Last Admin: 01/26/23 07:45 Dose:  10 ml


   








Microbiology Data (last 24 hrs): 








01/20/23 21:54   Blood  - Blood   Aerobic Blood Culture - Final


                            No growth in 5 days.


01/20/23 21:54   Blood  - Blood   Anaerobic Blood Culture - Final


                            No growth in 5 days.


01/20/23 21:48   Blood  - Blood   Aerobic Blood Culture - Final


                            No growth in 5 days.


01/20/23 21:48   Blood  - Blood   Anaerobic Blood Culture - Final








Assessment And Plan





- Current Problems (Diagnosis)


(1) Urinary tract infection


Current Visit: Yes   Status: Acute   


Plan: 





- possible urosepsis:





Cultures:


1/24 BC: Not done


1/22 UC: Negative


1/20 Stool: Pending


1/20 Parasites: Pending


1/20 BC: Negative





Antibiotics:


- IV Gentamicin, 1/22 to 1/25


- IV Vancomycin s/p HD, 1/22 to 26 (Stopped by Dr. Sylvester, per bedside RN)


- Dr. Roman added IV Meropenem, 1/24 to 1/26 (Stopped by Dr. Sylvester, per 

bedside RN)





Recommendation:


To continue IV Meropenem and Vancomycin 








Left renal stone: CT: 11 mm stone proximal left ureter with moderate left 

hydronephrosis. Pus seen on stenting with stent placement, 1/24











- Plan





- ESRD (end stage renal disease) on dialysis: Nephrology onboard


- Left hydronephrosis due to renal stone


- Left renal stone: CT: 11 mm stone proximal left ureter with moderate left 

hydronephrosis. Pus seen on stenting with stent placement, 1/24


- Atrial fibrillation: Managed by Primary team


- CHF (congestive heart failure): Managed by primary team


- Non-compliance with renal dialysis


- Chronic hypotension


- Hyperlipidemia


- Chronic anticoagulation use


- History of nephrolithiasis requiring stent placement


- Recurrent UTI


- End-stage renal disease


- HTN


- tubal ligation


- cholecystectomy


- right broken wrist


- cataracts removed


- back surgery





ID will closely monitoring the patient for signs of infection with fever and WBC

trends








Case has been discussed with Dr. Becerra, N





Physician Review: Patient Assessed, Agree with Above Assessment and Plan

## 2023-01-26 NOTE — P.PN
Subjective


Date of Service: 01/26/23


Primary Care Provider: Brittni


Chief Complaint: Respiratory failure


Subjective: No new changes





Patient was non compliant with her cpap early this morning.  Became hypoxic and 

bradycardic and was intubated 





Review of Systems


is unable to be obtained





Physical Examination





- Vital Signs


Temperature: 97.6 F


Blood Pressure: 90/60


Pulse: 101


Respirations: 22


Pulse Ox (%): 98





- Physical Exam


General: Alert, In no apparent distress


HEENT: Atraumatic, PERRLA, EOMI


Neck: Supple, JVD not distended


Respiratory: Clear to auscultation bilaterally, Normal air movement


Cardiovascular: Regular rate/rhythm, Normal S1 S2


Gastrointestinal: Normal bowel sounds, No tenderness


Musculoskeletal: No tenderness


Integumentary: No rashes


Neurological: Normal speech, Normal tone, Normal affect


Lymphatics: No axilla or inguinal lymphadenopathy





- Studies


Microbiology Data (last 24 hrs): 








01/20/23 21:54   Blood  - Blood   Aerobic Blood Culture - Final


                            No growth in 5 days.


01/20/23 21:54   Blood  - Blood   Anaerobic Blood Culture - Final


                            No growth in 5 days.


01/20/23 21:48   Blood  - Blood   Aerobic Blood Culture - Final


                            No growth in 5 days.


01/20/23 21:48   Blood  - Blood   Anaerobic Blood Culture - Final








Assessment And Plan





- Current Problems (Diagnosis)


(1) Left renal stone


Current Visit: Yes   Status: Chronic   


Plan: 


consult to Dr. FADY Roman.  He most likely will see her Monday night or Tuesday 

morning.  Will start her on flomax and work on pain management 


plans for stenting today 


1/25


Patient had puss on stenting.  Was sent to the ICU.  Added meropenem to the 

vancomycin. 








(2) ESRD (end stage renal disease) on dialysis


Current Visit: No   Status: Chronic   


Plan: 


restart dialysis with Dr. Medrano's group. 








(3) Atrial fibrillation


Current Visit: No   Status: Acute   


Plan: 


continue amiodarone and metoprolol 


Qualifiers: 


 





(4) CHF (congestive heart failure)


Current Visit: No   Status: Chronic   


Plan: 


will continue metoprolol.  She should be on a nocturnal cpap for her sleep 

apnea.  However the patient is not compliant.  She comes in several times and 

requires positive pressure ventilation.   She take it off as soon as she wakes 

up and refuses it.  


Qualifiers: 


   Heart failure type: diastolic   Heart failure chronicity: chronic   Qualified

Code(s): I50.32 - Chronic diastolic (congestive) heart failure   





(5) Respiratory failure


Current Visit: Yes   Status: Acute   


Plan: 


consult to Dr. Joshi.   She is on the vent protochol


Qualifiers: 


   Chronicity: acute   Respiratory failure complication: hypoxia   Qualified 

Code(s): J96.01 - Acute respiratory failure with hypoxia   





(6) Severe sepsis


Current Visit: Yes   Status: Acute   


Plan: 


continue Vanc and meropenem.  Cultures pending 





Discharge Plan: Home


Plan to discharge in: 24 Hours





- Code Status/Comfort Care


Code Status Assessed: No


Physician Review: Patient Assessed, Agree with Above Assessment and Plan


Critical Care: Yes


Time Spent Managing PTS Care (In Minutes): 20

## 2023-01-26 NOTE — RAD REPORT
EXAM DESCRIPTION:  XR Chest, 1 View

 

CLINICAL HISTORY:  The patient is 67 years old and is Female; PICC PLACEMENT

 

TECHNIQUE:  Single view of the chest.

 

COMPARISON:  January 25, 2023.

 

FINDINGS:  Lungs:   Retrocardiac atelectasis or consolidation is mildly increased. Improved atelectas
is in the right lung base.

   Pleural space:   Right pleural effusion is decreased in conspicuity. No pneumothorax.

   Heart:   Cardiomegaly.

   Mediastinum:   Unremarkable.

   Bones/joints:   The bones and joints are unchanged as visualized.

   Tubes, lines and devices:   Right PICC line is at the SVC/RA junction. Tunneled left IJ hemodialys
is catheter terminates in the right atrium.

         ET tube is 2 cm above the carlos. NG tube is in the stomach.

   Upper abdomen:   Elevated right hemidiaphragm.

 

IMPRESSION:  1.   Retrocardiac atelectasis or consolidation is mildly increased. Improved atelectasis
 in the right lung base.

2.   Lines and tubes as above.

 

Electronically signed by:   Alondra Mcdermott MD   1/26/2023 6:07 AM CST Workstation: [id number]

 

 

 

Due to temporary technical issues with the PACS/Fluency reporting system, reports are being signed by
 the in house radiologists without review as a courtesy to insure prompt reporting. The interpreting 
radiologist is fully responsible for the content of the report.

## 2023-01-27 LAB
ALBUMIN SERPL BCP-MCNC: 3.4 G/DL (ref 3.4–5)
ALP SERPL-CCNC: 100 U/L (ref 45–117)
ALT SERPL W P-5'-P-CCNC: 17 U/L (ref 13–56)
ANISOCYTOSIS BLD QL: SLIGHT
AST SERPL W P-5'-P-CCNC: 14 U/L (ref 15–37)
BUN BLD-MCNC: 54 MG/DL (ref 7–18)
COHGB MFR BLDA: 1.8 % (ref 0–1.5)
GLUCOSE SERPLBLD-MCNC: 130 MG/DL (ref 74–106)
HCT VFR BLD CALC: 28.6 % (ref 36–45)
LYMPHOCYTES # SPEC AUTO: 0.3 K/UL (ref 0.7–4.9)
MCV RBC: 99.8 FL (ref 80–100)
MORPHOLOGY BLD-IMP: (no result)
OXYHGB MFR BLDA: 90.2 % (ref 94–97)
PMV BLD: 8.9 FL (ref 7.6–11.3)
POTASSIUM SERPL-SCNC: 4 MMOL/L (ref 3.5–5.1)
RBC # BLD: 2.87 M/UL (ref 3.86–4.86)
SAO2 % BLDA: 93.1 % (ref 92–98.5)

## 2023-01-27 RX ADMIN — Medication SCH ML: at 20:07

## 2023-01-27 RX ADMIN — MIDAZOLAM PRN MG: 1 INJECTION INTRAMUSCULAR; INTRAVENOUS at 00:51

## 2023-01-27 RX ADMIN — MIDODRINE HYDROCHLORIDE SCH MG: 5 TABLET ORAL at 09:20

## 2023-01-27 RX ADMIN — MIDODRINE HYDROCHLORIDE SCH MG: 5 TABLET ORAL at 13:31

## 2023-01-27 RX ADMIN — MIDODRINE HYDROCHLORIDE SCH MG: 5 TABLET ORAL at 20:07

## 2023-01-27 RX ADMIN — HALOPERIDOL LACTATE PRN MG: 5 INJECTION, SOLUTION INTRAMUSCULAR at 14:32

## 2023-01-27 RX ADMIN — FENTANYL CITRATE PRN MCG: 50 INJECTION, SOLUTION INTRAMUSCULAR; INTRAVENOUS at 02:02

## 2023-01-27 RX ADMIN — Medication SCH ML: at 09:21

## 2023-01-27 RX ADMIN — AMIODARONE HYDROCHLORIDE SCH MG: 200 TABLET ORAL at 20:06

## 2023-01-27 RX ADMIN — ARFORMOTEROL TARTRATE SCH MCG: 15 SOLUTION RESPIRATORY (INHALATION) at 08:02

## 2023-01-27 RX ADMIN — SEVELAMER CARBONATE SCH: 800 TABLET, FILM COATED ORAL at 09:00

## 2023-01-27 RX ADMIN — PROPOFOL PRN MLS/HR: 10 INJECTION, EMULSION INTRAVENOUS at 21:23

## 2023-01-27 RX ADMIN — METOPROLOL TARTRATE SCH: 25 TABLET ORAL at 08:23

## 2023-01-27 RX ADMIN — MUPIROCIN SCH APPL: 20 OINTMENT TOPICAL at 20:10

## 2023-01-27 RX ADMIN — MIDAZOLAM PRN MG: 1 INJECTION INTRAMUSCULAR; INTRAVENOUS at 04:36

## 2023-01-27 RX ADMIN — MIDAZOLAM PRN MG: 1 INJECTION INTRAMUSCULAR; INTRAVENOUS at 14:05

## 2023-01-27 RX ADMIN — METHYLPREDNISOLONE SODIUM SUCCINATE SCH MG: 40 INJECTION, POWDER, FOR SOLUTION INTRAMUSCULAR; INTRAVENOUS at 09:20

## 2023-01-27 RX ADMIN — PROPOFOL PRN MLS/HR: 10 INJECTION, EMULSION INTRAVENOUS at 15:45

## 2023-01-27 RX ADMIN — PANTOPRAZOLE SODIUM SCH MG: 40 GRANULE, DELAYED RELEASE ORAL at 09:20

## 2023-01-27 RX ADMIN — ARFORMOTEROL TARTRATE SCH MCG: 15 SOLUTION RESPIRATORY (INHALATION) at 21:10

## 2023-01-27 RX ADMIN — METHYLPREDNISOLONE SODIUM SUCCINATE SCH MG: 40 INJECTION, POWDER, FOR SOLUTION INTRAMUSCULAR; INTRAVENOUS at 20:06

## 2023-01-27 RX ADMIN — FOLIC ACID SCH MG: 1 TABLET ORAL at 09:20

## 2023-01-27 RX ADMIN — SEVELAMER CARBONATE SCH: 800 TABLET, FILM COATED ORAL at 13:33

## 2023-01-27 RX ADMIN — FENTANYL CITRATE PRN MCG: 50 INJECTION, SOLUTION INTRAMUSCULAR; INTRAVENOUS at 06:20

## 2023-01-27 RX ADMIN — Medication SCH: at 19:24

## 2023-01-27 RX ADMIN — GABAPENTIN SCH MG: 300 CAPSULE ORAL at 20:06

## 2023-01-27 RX ADMIN — AMIODARONE HYDROCHLORIDE SCH MG: 200 TABLET ORAL at 09:20

## 2023-01-27 RX ADMIN — MUPIROCIN SCH APPL: 20 OINTMENT TOPICAL at 09:20

## 2023-01-27 RX ADMIN — Medication SCH: at 09:00

## 2023-01-27 RX ADMIN — FENTANYL CITRATE PRN MCG: 50 INJECTION, SOLUTION INTRAMUSCULAR; INTRAVENOUS at 12:31

## 2023-01-27 RX ADMIN — SEVELAMER CARBONATE SCH: 800 TABLET, FILM COATED ORAL at 20:08

## 2023-01-27 NOTE — RAD REPORT
EXAM DESCRIPTION:  RAD - Abdomen 1 View (KUB) - 1/27/2023 5:51 am

 

CLINICAL HISTORY:  NG tube insertion

 

COMPARISON:  Abdomen 1 View (KUB) dated 1/25/2023

 

FINDINGS:  NG/OG tube has been placed. Tip is in the region of the gastric antrum with no abnormal be
nd, kink or curling of the tubing.

## 2023-01-27 NOTE — P.PN
Subjective


Date of Service: 01/27/23


Primary Care Provider: Brittni


Chief Complaint: Respiratory failure


Subjective: No new changes





Patient had a negative ct and cta on 1/23.  No evidence of a stroke. 





Review of Systems


is unable to be obtained





Physical Examination





- Vital Signs


Temperature: 96.9 F


Blood Pressure: 111/65


Pulse: 67


Respirations: 19


Pulse Ox (%): 99





- Physical Exam


General: Alert, In no apparent distress


HEENT: Atraumatic, PERRLA, EOMI


Neck: Supple, JVD not distended


Respiratory: Clear to auscultation bilaterally, Normal air movement


Cardiovascular: Regular rate/rhythm, Normal S1 S2


Gastrointestinal: Normal bowel sounds, No tenderness


Musculoskeletal: No tenderness


Integumentary: No rashes


Neurological: Normal speech, Normal tone, Normal affect


Lymphatics: No axilla or inguinal lymphadenopathy





Assessment And Plan





- Current Problems (Diagnosis)


(1) Respiratory failure


Current Visit: Yes   Status: Acute   


Plan: 


consult to Dr. Joshi.   She is on the vent protochol


1/27


difficult to wean. She has tachycardia during vent trials 


Qualifiers: 


   Chronicity: acute   Respiratory failure complication: hypoxia   Qualified 

Code(s): J96.01 - Acute respiratory failure with hypoxia   





(2) Left renal stone


Current Visit: Yes   Status: Chronic   


Plan: 


consult to Dr. FADY Roman.  He most likely will see her Monday night or Tuesday 

morning.  Will start her on flomax and work on pain management 


plans for stenting today 


1/25


Patient had puss on stenting.  Was sent to the ICU.  Added meropenem to the 

vancomycin. 








(3) ESRD (end stage renal disease) on dialysis


Current Visit: No   Status: Chronic   


Plan: 


restart dialysis with Dr. Medrano's group. 








(4) Atrial fibrillation


Current Visit: No   Status: Acute   


Plan: 


continue amiodarone and metoprolol 


Qualifiers: 


 





(5) CHF (congestive heart failure)


Current Visit: No   Status: Chronic   


Plan: 


will continue metoprolol.  She should be on a nocturnal cpap for her sleep 

apnea.  However the patient is not compliant.  She comes in several times and 

requires positive pressure ventilation.   She take it off as soon as she wakes 

up and refuses it.  


Qualifiers: 


   Heart failure type: diastolic   Heart failure chronicity: chronic   Qualified

Code(s): I50.32 - Chronic diastolic (congestive) heart failure   





(6) Severe sepsis


Current Visit: Yes   Status: Acute   


Plan: 


continue Vanc and meropenem.  Cultures pending 





Discharge Plan: Home


Plan to discharge in: Greater than 2 days





- Code Status/Comfort Care


Code Status Assessed: No


Physician Review: Patient Assessed, Agree with Above Assessment and Plan


Critical Care: Yes


Time Spent Managing PTS Care (In Minutes): 20

## 2023-01-27 NOTE — PN
Subjective:  The patient is lying in bed.  Family by the bedside.  No new acute event.  Continued to 
be on ventilator.



Objective:  Vital Signs:  Reviewed. 

Lungs:  Basal crackles. 

Heart:  S1, S2 regular. 

Abdomen:  Soft, nontender.  Bowel sounds present. 

Extremities:  No edema.



Laboratory Data:  Reviewed.



Assessment And Plan:  Respiratory failure.  The patient is off antibiotic as per Pulmonary Team.  We 
will continue to monitor.  The patient is pancytopenic at this time.  Renal failure, end-stage renal 
disease, on dialysis; congestive heart failure; sepsis; moderate protein-calorie malnutrition.  We wi
ll continue to monitor patient for signs of infection with WBC and fever trends.  No other recommenda
tion at this time.





NF/MODL

DD:  01/27/2023 19:28:45Voice ID:  [id number]

DT:  01/27/2023 20:05:57Report ID:  [id number]

## 2023-01-27 NOTE — P.PN
Subjective


Date of Service: 01/27/23


Primary Care Provider: Brittni


Chief Complaint: Respiratory failure unable to wean off the ventilator


Subjective: Other (Remains bed ridden.)





Physical Examination





- Vital Signs


Temperature: 96.9 F


Blood Pressure: 111/65


Pulse: 67


Respirations: 18


Pulse Ox (%): 98





- Physical Exam


General: Other (acutely ill appearing)


HEENT: Atraumatic, Normocephalic


Neck: Supple


Respiratory: Other (symmetric ches expansion)


Cardiovascular: No rubs, No murmurs


Gastrointestinal: Soft and benign, No guarding


Musculoskeletal: No clubbing


Integumentary: No warmth


Neurological: Normal tone


Urinary: Other (no bladder distention)


External genitalia: Deferred


Rectal: Deferred





Assessment And Plan





- Plan





1. ESRD on outpt HD TTS.  PUF 2 hrs x 3L UF today.  HD tomorrow.


2. Volume overload. PUF/HD as above.


2. Hypertension.  +Intradialytic hypotension.   Monitor.


3. Anemia of chronic kidney disease. Continue LENARD.


4. Secondary hyperparathyroidism. Continue Renvela.


5. Acute respiratory failure, multifactorial, secondary to chronic obstructive 

pulmonary disease exacerbation/over volume. PUF/HD as above.


 


Physician Review: Patient Assessed, Agree with Above Assessment and Plan

## 2023-01-27 NOTE — RAD REPORT
EXAM DESCRIPTION:  RAD - Chest Single View - 1/27/2023 5:51 am

 

CLINICAL HISTORY:  Intubated

 

COMPARISON:  January 26

 

TECHNIQUE:  AP portable chest image was obtained 1/27/2023 5:51 am .

 

FINDINGS:  Endotracheal tube remains in place. Tip is at the carlos but does not extend into either m
ainstem bronchus. Retraction the tubing 1-2 cm would be more optimal positioning. NG/OG tube is in pl
ace with the tip extending below the diaphragm, off the field of view.

 

Right-sided PICC line is in place. Tip is near the SVC atrial junction not optimally visualized on th
is study. Positioning does not appear to have changed from comparison. Left-sided double-lumen dialys
is catheter is in place. Loop recorder overlies the left side of the chest.

 

Lung volumes are low. Chronic right base atelectasis again noted. Patchy opacification in the lower l
eft lung field is similar to prior imaging. Retrocardiac left base remains suboptimally visualized.

 

 Heart size and vasculature are prominent but not clearly different. No new or progressive failure or
 volume overload finding suspected. No measurable pleural effusion and no pneumothorax. Delete select


 

IMPRESSION:  Endotracheal tube tip is at the carlos but does not extend into either mainstem bronchus
. Patient has a relatively short length of the trachea. Retraction of the ET tube 1-2 cm would be mor
e optimal positioning.

 

NG/OG tube, dialysis catheter and PICC line are unchanged.

 

Right base atelectasis and left lung base opacification are not clearly different from prior imaging.


 

Heart size and vasculature remain prominent. No new or progressive failure or volume overload.

## 2023-01-28 LAB
ALBUMIN SERPL BCP-MCNC: 3.9 G/DL (ref 3.4–5)
ALP SERPL-CCNC: 95 U/L (ref 45–117)
ALT SERPL W P-5'-P-CCNC: 15 U/L (ref 13–56)
AST SERPL W P-5'-P-CCNC: 8 U/L (ref 15–37)
BUN BLD-MCNC: 74 MG/DL (ref 7–18)
GLUCOSE SERPLBLD-MCNC: 153 MG/DL (ref 74–106)
HCT VFR BLD CALC: 28.3 % (ref 36–45)
LYMPHOCYTES # SPEC AUTO: 0.3 K/UL (ref 0.7–4.9)
MCV RBC: 98.8 FL (ref 80–100)
PMV BLD: 8.3 FL (ref 7.6–11.3)
POTASSIUM SERPL-SCNC: 4.6 MMOL/L (ref 3.5–5.1)
RBC # BLD: 2.86 M/UL (ref 3.86–4.86)

## 2023-01-28 RX ADMIN — METHYLPREDNISOLONE SODIUM SUCCINATE SCH MG: 40 INJECTION, POWDER, FOR SOLUTION INTRAMUSCULAR; INTRAVENOUS at 08:01

## 2023-01-28 RX ADMIN — HEPARIN SODIUM PRN UNIT: 1000 INJECTION, SOLUTION INTRAVENOUS; SUBCUTANEOUS at 22:00

## 2023-01-28 RX ADMIN — Medication SCH ML: at 07:57

## 2023-01-28 RX ADMIN — MUPIROCIN SCH APPL: 20 OINTMENT TOPICAL at 21:14

## 2023-01-28 RX ADMIN — Medication SCH: at 21:00

## 2023-01-28 RX ADMIN — METHYLPREDNISOLONE SODIUM SUCCINATE SCH MG: 40 INJECTION, POWDER, FOR SOLUTION INTRAMUSCULAR; INTRAVENOUS at 21:11

## 2023-01-28 RX ADMIN — PANTOPRAZOLE SODIUM SCH MG: 40 GRANULE, DELAYED RELEASE ORAL at 08:02

## 2023-01-28 RX ADMIN — ARFORMOTEROL TARTRATE SCH MCG: 15 SOLUTION RESPIRATORY (INHALATION) at 08:00

## 2023-01-28 RX ADMIN — SEVELAMER CARBONATE SCH MG: 800 TABLET, FILM COATED ORAL at 14:32

## 2023-01-28 RX ADMIN — EPOETIN ALFA-EPBX SCH UNIT: 4000 INJECTION, SOLUTION INTRAVENOUS; SUBCUTANEOUS at 22:00

## 2023-01-28 RX ADMIN — PROPOFOL PRN MLS/HR: 10 INJECTION, EMULSION INTRAVENOUS at 06:00

## 2023-01-28 RX ADMIN — PROPOFOL PRN MLS/HR: 10 INJECTION, EMULSION INTRAVENOUS at 14:26

## 2023-01-28 RX ADMIN — SEVELAMER CARBONATE SCH MG: 800 TABLET, FILM COATED ORAL at 21:11

## 2023-01-28 RX ADMIN — FENTANYL CITRATE PRN MCG: 50 INJECTION, SOLUTION INTRAMUSCULAR; INTRAVENOUS at 01:24

## 2023-01-28 RX ADMIN — MIDODRINE HYDROCHLORIDE SCH MG: 5 TABLET ORAL at 21:11

## 2023-01-28 RX ADMIN — FOLIC ACID SCH MG: 1 TABLET ORAL at 08:01

## 2023-01-28 RX ADMIN — MUPIROCIN SCH APPL: 20 OINTMENT TOPICAL at 07:56

## 2023-01-28 RX ADMIN — FENTANYL CITRATE PRN MCG: 50 INJECTION, SOLUTION INTRAMUSCULAR; INTRAVENOUS at 09:53

## 2023-01-28 RX ADMIN — GABAPENTIN SCH MG: 300 CAPSULE ORAL at 21:11

## 2023-01-28 RX ADMIN — Medication SCH: at 07:56

## 2023-01-28 RX ADMIN — MIDODRINE HYDROCHLORIDE SCH MG: 5 TABLET ORAL at 08:01

## 2023-01-28 RX ADMIN — MIDODRINE HYDROCHLORIDE SCH MG: 5 TABLET ORAL at 14:32

## 2023-01-28 RX ADMIN — SEVELAMER CARBONATE SCH MG: 800 TABLET, FILM COATED ORAL at 08:01

## 2023-01-28 RX ADMIN — AMIODARONE HYDROCHLORIDE SCH MG: 200 TABLET ORAL at 21:11

## 2023-01-28 RX ADMIN — PROPOFOL PRN MLS/HR: 10 INJECTION, EMULSION INTRAVENOUS at 21:11

## 2023-01-28 RX ADMIN — ARFORMOTEROL TARTRATE SCH MCG: 15 SOLUTION RESPIRATORY (INHALATION) at 21:20

## 2023-01-28 RX ADMIN — MIDAZOLAM PRN MG: 1 INJECTION INTRAMUSCULAR; INTRAVENOUS at 13:12

## 2023-01-28 RX ADMIN — AMIODARONE HYDROCHLORIDE SCH MG: 200 TABLET ORAL at 08:02

## 2023-01-28 RX ADMIN — Medication SCH ML: at 21:12

## 2023-01-28 NOTE — P.PN
Subjective


Date of Service: 01/28/23


Primary Care Provider: Brittni


Chief Complaint: Respiratory failure unable to wean off the ventilator


Subjective: No new changes





Physical Examination





- Vital Signs


Temperature: 96.9 F


Blood Pressure: 122/79


Pulse: 107


Respirations: 24


Pulse Ox (%): 90





- Physical Exam


General: Other (appears as her stated age)


HEENT: Atraumatic, Normocephalic


Neck: Supple


Respiratory: Other (symmetric chest expansion)


Cardiovascular: No rubs, No murmurs


Gastrointestinal: Soft and benign


Musculoskeletal: No clubbing, Swelling


Integumentary: No warmth


Neurological: Other (non focal)


Urinary: Other (no bladder distention)


External genitalia: Deferred


Rectal: Deferred





Assessment And Plan





- Plan





1. ESRD on outpt HD TTS.  PUF received yeserday.  HD today. 


2. Volume overload. PUF/HD as above.


2. Hypertension.  +Intradialytic hypotension.   Monitor.


3. Anemia of chronic kidney disease. Continue LENARD.


4. Secondary hyperparathyroidism. Continue Renvela.


5. Acute respiratory failure, multifactorial, secondary to chronic obstructive 

pulmonary disease exacerbation/over volume. PUF/HD as above.


 


Physician Review: Patient Assessed, Agree with Above Assessment and Plan

## 2023-01-28 NOTE — P.PN
Subjective


Date of Service: 01/28/23


Primary Care Provider: Brittni


Chief Complaint: Respiratory failure unable to wean off the ventilator


Subjective: No new changes (patient very agitated yesterday and was started on a

propofol drip)





Patient had a negative ct and cta on 1/23.  No evidence of a stroke. 





Review of Systems


is unable to be obtained





Physical Examination





- Vital Signs


Temperature: 96.9 F


Blood Pressure: 122/79


Pulse: 107


Respirations: 24


Pulse Ox (%): 90





- Physical Exam


General: Alert, In no apparent distress


HEENT: Atraumatic, PERRLA, EOMI


Neck: Supple, JVD not distended


Respiratory: Clear to auscultation bilaterally, Normal air movement


Cardiovascular: Regular rate/rhythm, Normal S1 S2


Gastrointestinal: Normal bowel sounds, No tenderness


Musculoskeletal: No tenderness


Integumentary: No rashes


Neurological: Normal speech, Normal tone, Normal affect


Lymphatics: No axilla or inguinal lymphadenopathy





Assessment And Plan





- Current Problems (Diagnosis)


(1) Respiratory failure


Current Visit: Yes   Status: Acute   


Plan: 


consult to Dr. Joshi.   She is on the vent protochol


1/27


difficult to wean. She has tachycardia during vent trials 


Qualifiers: 


   Chronicity: acute   Respiratory failure complication: hypoxia   Qualified 

Code(s): J96.01 - Acute respiratory failure with hypoxia   





(2) ESRD (end stage renal disease) on dialysis


Current Visit: No   Status: Chronic   


Plan: 


restart dialysis with Dr. Medrano's group. 








(3) Atrial fibrillation


Current Visit: No   Status: Acute   


Plan: 


continue amiodarone and metoprolol 


Qualifiers: 


 





(4) CHF (congestive heart failure)


Current Visit: No   Status: Chronic   


Plan: 


will continue metoprolol.  She should be on a nocturnal cpap for her sleep apn

ea.  However the patient is not compliant.  She comes in several times and 

requires positive pressure ventilation.   She take it off as soon as she wakes 

up and refuses it.  


Qualifiers: 


   Heart failure type: diastolic   Heart failure chronicity: chronic   Qualified

Code(s): I50.32 - Chronic diastolic (congestive) heart failure   





(5) Severe sepsis


Current Visit: Yes   Status: Acute   


Plan: 


continue Vanc and meropenem.  Cultures pending 








(6) End of life care


Current Visit: Yes   Status: Acute   


Plan: 


 at bedside. Discussed weaning and the difficulty we have been having 

with getting her off the meds.   We may have to either pull out the ET and see 

how she does.  After 7 days we may need to do a tracheostomy.  Those are the o

ptions.  The  indicated that she would not want to live on lifesupport 

machine.  WE can discuss this in a few more days and see if a terminal wean is 

an option.





Discharge Plan: Home





- Code Status/Comfort Care


Code Status Assessed: No


Physician Review: Patient Assessed, Agree with Above Assessment and Plan


Critical Care: Yes


Time Spent Managing PTS Care (In Minutes): 20

## 2023-01-28 NOTE — P.PN
Subjective


Date of Service: 01/28/23


Primary Care Provider: Brittni


Chief Complaint: Respiratory failure unable to wean off the ventilator


No change in patient's condition unable to wean off the ventilator still is very

anxious and agitated





Review of Systems


is unable to be obtained





Physical Examination





- Vital Signs


Temperature: 96.9 F


Blood Pressure: 122/79


Pulse: 107


Respirations: 24


Pulse Ox (%): 90





- Physical Exam


General: Unresponsive


Respiratory: Clear to auscultation bilaterally, Normal air movement


Cardiovascular: No edema, Normal S1 S2





Assessment And Plan





- Current Problems (Diagnosis)


(1) Respiratory failure


Current Visit: Yes   Status: Acute   


Plan: 


Patient admitted with respiratory failure unable to wean the patient off very 

anxious and agitated all cultures are so far negative still has an FiO2 of 60% 

chest x-ray shows diminished lung volume tolerating tube feeds change to SIMV 

pressure support prognosis poor if not successful we will discuss with the 

 regarding a trach


Qualifiers: 


   Chronicity: acute 


Physician Review: Patient Assessed, Agree with Above Assessment and Plan

## 2023-01-29 LAB
COHGB MFR BLDA: 1.9 % (ref 0–1.5)
OXYHGB MFR BLDA: 94.6 % (ref 94–97)
SAO2 % BLDA: 97.7 % (ref 92–98.5)

## 2023-01-29 RX ADMIN — MUPIROCIN SCH APPL: 20 OINTMENT TOPICAL at 20:57

## 2023-01-29 RX ADMIN — PANTOPRAZOLE SODIUM SCH MG: 40 GRANULE, DELAYED RELEASE ORAL at 09:33

## 2023-01-29 RX ADMIN — Medication SCH: at 20:56

## 2023-01-29 RX ADMIN — MIDODRINE HYDROCHLORIDE SCH MG: 5 TABLET ORAL at 20:55

## 2023-01-29 RX ADMIN — FOLIC ACID SCH MG: 1 TABLET ORAL at 09:34

## 2023-01-29 RX ADMIN — MIDODRINE HYDROCHLORIDE SCH MG: 5 TABLET ORAL at 14:48

## 2023-01-29 RX ADMIN — AMIODARONE HYDROCHLORIDE SCH MG: 200 TABLET ORAL at 09:34

## 2023-01-29 RX ADMIN — MUPIROCIN SCH APPL: 20 OINTMENT TOPICAL at 09:34

## 2023-01-29 RX ADMIN — SEVELAMER CARBONATE SCH MG: 800 TABLET, FILM COATED ORAL at 20:56

## 2023-01-29 RX ADMIN — Medication SCH ML: at 20:56

## 2023-01-29 RX ADMIN — SEVELAMER CARBONATE SCH MG: 800 TABLET, FILM COATED ORAL at 09:34

## 2023-01-29 RX ADMIN — AMIODARONE HYDROCHLORIDE SCH MG: 200 TABLET ORAL at 20:56

## 2023-01-29 RX ADMIN — METHYLPREDNISOLONE SODIUM SUCCINATE SCH MG: 40 INJECTION, POWDER, FOR SOLUTION INTRAMUSCULAR; INTRAVENOUS at 09:33

## 2023-01-29 RX ADMIN — MIDAZOLAM PRN MG: 1 INJECTION INTRAMUSCULAR; INTRAVENOUS at 15:29

## 2023-01-29 RX ADMIN — PROPOFOL PRN MLS/HR: 10 INJECTION, EMULSION INTRAVENOUS at 02:34

## 2023-01-29 RX ADMIN — MIDODRINE HYDROCHLORIDE SCH MG: 5 TABLET ORAL at 09:34

## 2023-01-29 RX ADMIN — PROPOFOL PRN MLS/HR: 10 INJECTION, EMULSION INTRAVENOUS at 06:39

## 2023-01-29 RX ADMIN — MIDAZOLAM PRN MG: 1 INJECTION INTRAMUSCULAR; INTRAVENOUS at 09:28

## 2023-01-29 RX ADMIN — PROPOFOL PRN MLS/HR: 10 INJECTION, EMULSION INTRAVENOUS at 12:30

## 2023-01-29 RX ADMIN — SEVELAMER CARBONATE SCH MG: 800 TABLET, FILM COATED ORAL at 14:48

## 2023-01-29 RX ADMIN — PROPOFOL PRN MLS/HR: 10 INJECTION, EMULSION INTRAVENOUS at 18:02

## 2023-01-29 RX ADMIN — ARFORMOTEROL TARTRATE SCH MCG: 15 SOLUTION RESPIRATORY (INHALATION) at 21:00

## 2023-01-29 RX ADMIN — Medication SCH ML: at 09:34

## 2023-01-29 RX ADMIN — Medication SCH: at 09:00

## 2023-01-29 RX ADMIN — GABAPENTIN SCH MG: 300 CAPSULE ORAL at 20:56

## 2023-01-29 RX ADMIN — PROPOFOL PRN MLS/HR: 10 INJECTION, EMULSION INTRAVENOUS at 20:55

## 2023-01-29 RX ADMIN — HALOPERIDOL LACTATE PRN MG: 5 INJECTION, SOLUTION INTRAMUSCULAR at 15:29

## 2023-01-29 RX ADMIN — ARFORMOTEROL TARTRATE SCH MCG: 15 SOLUTION RESPIRATORY (INHALATION) at 08:08

## 2023-01-29 NOTE — P.PN
Subjective


Date of Service: 01/29/23


Primary Care Provider: Brittni


Chief Complaint: Respiratory failure unable to wean off the ventilator


Patient continues to remain very very agitated unable to wean off from the 

ventilator





Review of Systems


is unable to be obtained





Physical Examination





- Vital Signs


Temperature: 96.9 F


Blood Pressure: 108/64


Pulse: 90


Respirations: 17


Pulse Ox (%): 94





- Physical Exam


General: Unresponsive


Respiratory: Clear to auscultation bilaterally, Diminished


Cardiovascular: Regular rate/rhythm, Normal S1 S2





Assessment And Plan





- Current Problems (Diagnosis)


(1) Respiratory failure


Current Visit: Yes   Status: Acute   


Plan: 


Respiratory failure unable to wean off from the ventilator discussed with family

members options given I want us to do do a tracheostomy and proceed with LTAC 

and chronic ventilator or withdraw care comfort care discussed with the  

who states that her she has very poor baseline and he will talk to his other son

centering withdrawing her off from the ventilator and for comfort care labs 

reviewed hemodynamically stable no evidence of sepsis DC steroids may be 

contributing to her agitation vital signs are stable labs and meds reviewed


Qualifiers: 


   Chronicity: acute on chronic 


Physician Review: Patient Assessed, Agree with Above Assessment and Plan

## 2023-01-29 NOTE — P.PN
Subjective


Date of Service: 01/29/23


Primary Care Provider: Brittni


Chief Complaint: Respiratory failure unable to wean off the ventilator





Patient family at the bedside.  Decided for withdrawal of care on Monday 





Review of Systems


is unable to be obtained





Physical Examination





- Vital Signs


Temperature: 96.9 F


Blood Pressure: 108/64


Pulse: 90


Respirations: 17


Pulse Ox (%): 94





- Physical Exam


General: Alert, In no apparent distress


HEENT: Atraumatic, PERRLA, EOMI


Neck: Supple, JVD not distended


Respiratory: Clear to auscultation bilaterally, Normal air movement


Cardiovascular: Regular rate/rhythm, Normal S1 S2


Gastrointestinal: Normal bowel sounds, No tenderness


Musculoskeletal: No tenderness


Integumentary: No rashes


Neurological: Normal speech, Normal tone, Normal affect


Lymphatics: No axilla or inguinal lymphadenopathy





Assessment And Plan





- Current Problems (Diagnosis)


(1) End of life care


Current Visit: Yes   Status: Acute   


Plan: 


 at bedside. Discussed weaning and the difficulty we have been having 

with getting her off the meds.   We may have to either pull out the ET and see 

how she does.  After 7 days we may need to do a tracheostomy.  Those are the 

options.  The  indicated that she would not want to live on lifesupport 

machine.  WE can discuss this in a few more days and see if a terminal wean is 

an option.


1/29


Plans for end of life care on Monday 








(2) Respiratory failure


Current Visit: Yes   Status: Acute   


Plan: 


consult to Dr. Joshi.   She is on the vent protochol


1/27


difficult to wean. She has tachycardia during vent trials 


Qualifiers: 


   Chronicity: acute   Respiratory failure complication: hypoxia   Qualified 

Code(s): J96.01 - Acute respiratory failure with hypoxia   





(3) ESRD (end stage renal disease) on dialysis


Current Visit: No   Status: Chronic   


Plan: 


restart dialysis with Dr. Medrano's group. 








(4) Atrial fibrillation


Current Visit: No   Status: Acute   


Plan: 


continue amiodarone and metoprolol 


Qualifiers: 


 





(5) CHF (congestive heart failure)


Current Visit: No   Status: Chronic   


Plan: 


will continue metoprolol.  She should be on a nocturnal cpap for her sleep 

apnea.  However the patient is not compliant.  She comes in several times and 

requires positive pressure ventilation.   She take it off as soon as she wakes 

up and refuses it.  


Qualifiers: 


   Heart failure type: diastolic   Heart failure chronicity: chronic   Qualified

Code(s): I50.32 - Chronic diastolic (congestive) heart failure   





(6) Severe sepsis


Current Visit: Yes   Status: Acute   


Plan: 


continue Vanc and meropenem.  Cultures pending 





Discharge Plan: Other


Plan to discharge in: 24 Hours





- Code Status/Comfort Care


Code Status Assessed: No


Code Status: Do Not Attempt Resuscitat


Physician Review: Patient Assessed, Agree with Above Assessment and Plan


Critical Care: Yes


Time Spent Managing PTS Care (In Minutes): 25

## 2023-01-29 NOTE — RAD REPORT
EXAM DESCRIPTION:  RAD - Chest Single View - 1/29/2023 7:02 am

 

CLINICAL HISTORY:  vented

 

COMPARISON:  Chest Single View dated 1/27/2023; Abdomen 1 View (KUB) dated 1/27/2023; Chest Single Vi
ew dated 1/26/2023; Abdomen 1 View (KUB) dated 1/25/2023; Stone Protocol dated 1/22/2023

 

FINDINGS:  Patient is significantly rotated which limits evaluation. Left IJ approach dialysis cathet
er with tip in similar positioning. Right subclavian approach PICC with tip overlying the SVC. Enteri
c tube below the diaphragm. Endotracheal tube tip is at the level of carlos. Loop recorder. Low lung 
volumes and basilar airspace disease grossly similar .

 

IMPRESSION:  1. Support apparatus in similar positioning. Note that the endotracheal tube could be re
tracted by 2 cm for more optimal positioning. The terminates at the level of the carlos.

2. Though limited by patient rotation, basilar airspace disease again noted which could reflect atele
ctasis and/or consolidation.

## 2023-01-29 NOTE — P.PN
Subjective


Date of Service: 01/29/23


Primary Care Provider: Brittni


Chief Complaint: Respiratory failure unable to wean off the ventilator


Subjective: No new changes





Physical Examination





- Vital Signs


Temperature: 96.9 F


Blood Pressure: 108/64


Pulse: 90


Respirations: 17


Pulse Ox (%): 94





- Physical Exam


General: Other (chronically ill appearing)


HEENT: Atraumatic, Normocephalic


Neck: Supple


Respiratory: Other (symmetric chest expansion)


Cardiovascular: No rubs, No murmurs


Gastrointestinal: Soft and benign, No guarding


Musculoskeletal: No clubbing


Integumentary: No warmth


Neurological: Normal tone


Urinary: Other (No bladder distention)


External genitalia: Deferred


Rectal: Deferred





Assessment And Plan





- Plan





1. ESRD on outpt HD TTS.  HD received yesterday. No further HD.  For transition 

to hospice tomorrow.


2. Volume overload. Received PUF/HD.


2. Hypertension.  +Intradialytic hypotension.   Monitor.


3. Anemia of chronic kidney disease. Continue LENARD.


4. Secondary hyperparathyroidism. Continue Renvela.


5. Acute respiratory failure, multifactorial, secondary to chronic obstructive 

pulmonary disease exacerbation/over volume. Received PUF/HD.


6. Dispo.  Planning for transition to hospice tomorrow.


 


Physician Review: Patient Assessed, Agree with Above Assessment and Plan

## 2023-01-30 VITALS — SYSTOLIC BLOOD PRESSURE: 67 MMHG | DIASTOLIC BLOOD PRESSURE: 38 MMHG

## 2023-01-30 VITALS — TEMPERATURE: 97.2 F

## 2023-01-30 VITALS — OXYGEN SATURATION: 61 %

## 2023-01-30 RX ADMIN — AMIODARONE HYDROCHLORIDE SCH MG: 200 TABLET ORAL at 08:34

## 2023-01-30 RX ADMIN — AMIODARONE HYDROCHLORIDE SCH: 200 TABLET ORAL at 20:23

## 2023-01-30 RX ADMIN — SEVELAMER CARBONATE SCH: 800 TABLET, FILM COATED ORAL at 13:04

## 2023-01-30 RX ADMIN — MIDODRINE HYDROCHLORIDE SCH: 5 TABLET ORAL at 13:04

## 2023-01-30 RX ADMIN — PROPOFOL PRN MLS/HR: 10 INJECTION, EMULSION INTRAVENOUS at 00:11

## 2023-01-30 RX ADMIN — GABAPENTIN SCH: 300 CAPSULE ORAL at 20:24

## 2023-01-30 RX ADMIN — PROPOFOL PRN MLS/HR: 10 INJECTION, EMULSION INTRAVENOUS at 09:37

## 2023-01-30 RX ADMIN — FENTANYL CITRATE PRN MCG: 50 INJECTION, SOLUTION INTRAMUSCULAR; INTRAVENOUS at 12:02

## 2023-01-30 RX ADMIN — Medication SCH: at 08:34

## 2023-01-30 RX ADMIN — Medication SCH: at 20:24

## 2023-01-30 RX ADMIN — MUPIROCIN SCH: 20 OINTMENT TOPICAL at 20:23

## 2023-01-30 RX ADMIN — MUPIROCIN SCH APPL: 20 OINTMENT TOPICAL at 08:33

## 2023-01-30 RX ADMIN — PROPOFOL PRN MLS/HR: 10 INJECTION, EMULSION INTRAVENOUS at 03:55

## 2023-01-30 RX ADMIN — MIDODRINE HYDROCHLORIDE SCH MG: 5 TABLET ORAL at 08:34

## 2023-01-30 RX ADMIN — MIDODRINE HYDROCHLORIDE SCH: 5 TABLET ORAL at 20:24

## 2023-01-30 RX ADMIN — PANTOPRAZOLE SODIUM SCH MG: 40 GRANULE, DELAYED RELEASE ORAL at 08:34

## 2023-01-30 RX ADMIN — FOLIC ACID SCH MG: 1 TABLET ORAL at 08:34

## 2023-01-30 RX ADMIN — SEVELAMER CARBONATE SCH: 800 TABLET, FILM COATED ORAL at 20:24

## 2023-01-30 RX ADMIN — Medication SCH ML: at 09:13

## 2023-01-30 RX ADMIN — ARFORMOTEROL TARTRATE SCH MCG: 15 SOLUTION RESPIRATORY (INHALATION) at 07:30

## 2023-01-30 RX ADMIN — FENTANYL CITRATE PRN MCG: 50 INJECTION, SOLUTION INTRAMUSCULAR; INTRAVENOUS at 17:06

## 2023-01-30 RX ADMIN — SEVELAMER CARBONATE SCH MG: 800 TABLET, FILM COATED ORAL at 08:34

## 2023-01-30 RX ADMIN — Medication SCH: at 20:23

## 2023-01-30 NOTE — P.DS
Admission Date: 01/21/23


Discharge Date: 01/30/23


Primary Care Provider: Brittni


Reason for Admission: Respiratory failure unable to wean off the ventilator





- Problems


(1) End of life care


Current Visit: Yes   Status: Acute   





(2) Respiratory failure


Current Visit: Yes   Status: Acute   


Qualifiers: 


   Chronicity: acute   Respiratory failure complication: hypoxia   Qualified 

Code(s): J96.01 - Acute respiratory failure with hypoxia   





(3) ESRD (end stage renal disease) on dialysis


Current Visit: No   Status: Chronic   





(4) Atrial fibrillation


Current Visit: No   Status: Acute   


Qualifiers: 


 





(5) CHF (congestive heart failure)


Current Visit: No   Status: Chronic   


Qualifiers: 


   Heart failure type: diastolic   Heart failure chronicity: chronic   Qualified

Code(s): I50.32 - Chronic diastolic (congestive) heart failure   





(6) Severe sepsis


Current Visit: Yes   Status: Acute   


Brief History of Present Illness: 





Patient is well know to me.  Has chf and esrd.  She seems to have missed her 

Thursday dialysis.  She does not know why she was here.  May have had some 

diarrhea.  The patient was unresponsive when she came to the ER.  She is awake 

and off the bipap this morning.  Nephrology has been consulted. 


Hospital Course: 





Patient was admitted for chf exacerbation. She was seen by Nephrology.  Urology 

placed a stent for renal stone with hydronephrosis.  She became tachycardic post

op and was admitted to the ICU.  The patient would take her cpap off and became 

intubated.  She failed several weaning trials.  We discussed with the family.  

They decided to withdraw care.  Will remove the ET tube and let her move on.  

Thank you for allowing me to take part in her care. 


Vital Signs/Physical Exam: 














Temp Pulse Resp BP Pulse Ox


 


 97.5 F   80   20   109/54 L  99 


 


 01/30/23 08:00  01/30/23 09:00  01/30/23 09:00  01/30/23 09:00  01/30/23 09:00








General: Alert, In no apparent distress


HEENT: Atraumatic, PERRLA, EOMI


Neck: Supple, JVD not distended


Respiratory: Clear to auscultation bilaterally, Normal air movement


Cardiovascular: Regular rate/rhythm, Normal S1 S2


Gastrointestinal: Normal bowel sounds, No tenderness


Musculoskeletal: No tenderness


Integumentary: No rashes


Neurological: Normal speech, Normal tone, Normal affect


Lymphatics: No axilla or inguinal lymphadenopathy


Laboratory Data at Discharge: 














WBC  5.00 K/uL (4.3-10.9)   01/28/23  05:00    


 


Hgb  8.8 g/dL (12.0-15.0)  L  01/28/23  05:00    


 


Hct  28.3 % (36.0-45.0)  L  01/28/23  05:00    


 


Plt Count  105 K/uL (152-406)  L D 01/28/23  05:00    


 


PT  17.0 SECONDS (9.5-12.5)  H  01/23/23  10:08    


 


INR  1.55   01/23/23  10:08    


 


APTT  Cancelled   01/24/23  07:00    


 


Sodium  139 mmol/L (136-145)   01/28/23  05:00    


 


Potassium  4.6 mmol/L (3.5-5.1)  D 01/28/23  05:00    


 


BUN  74 mg/dL (7-18)  H  01/28/23  05:00    


 


Creatinine  8.93 mg/dL (0.55-1.02)  H*  01/28/23  05:00    


 


Glucose  153 mg/dL ()  H  01/28/23  05:00    


 


Magnesium  2.6 mg/dL (1.6-2.4)  H  01/20/23  19:13    


 


Total Bilirubin  1.2 mg/dL (0.2-1.0)  H  01/28/23  05:00    


 


AST  8 U/L (15-37)  L  01/28/23  05:00    


 


ALT  15 U/L (13-56)   01/28/23  05:00    


 


Alkaline Phosphatase  95 U/L ()   01/28/23  05:00    


 


Triglycerides  80 mg/dL (<150)   01/23/23  10:08    


 


Cholesterol  74 mg/dL (<200)   01/23/23  10:08    


 


HDL Cholesterol  34 mg/dL (40-60)  L  01/23/23  10:08    


 


Cholesterol/HDL Ratio  2.18   01/23/23  10:08    


 


Lipase  Cancelled   01/22/23  Unknown








Home Medications: 








Apixaban [Eliquis] 2.5 mg PO BID 07/29/22 


Bumetanide 2 tab PO DAILY 07/29/22 


Midodrine HCl 5 mg PO TID 07/29/22 


Pantoprazole [Protonix Tab*] 1 tab PO DAILY 07/29/22 


Sevelamer Carbonate [Renvela] 800 mg PO TID 07/29/22 


Amiodarone HCl [Cordarone*] 200 mg PO BID 90 Days #180 tab 12/30/22 


Alendronate Sodium 70 mg PO MO 01/04/23 


Gabapentin 300 mg PO BEDTIME 01/04/23 


Promethazine Tab [Phenergan*] 25 mg PO Q6HP PRN 30 Days #90 tab 01/05/23 


Brimonidine Tartrate [Alphagan P] 1 drop EACH EYE BID 01/15/23 


Metoprolol Tartrate [Lopressor*] 12.5 mg PO BID 01/15/23 


Minocycline HCl [Minocin] 1 tab PO BID 01/15/23 





Followup: 


Anthony Elkins MD [Primary Care Provider] - 


Physician Review: Patient Assessed, Agree with Above Assessment and Plan


Time spent managing pt's care (in minutes): 40

## 2023-01-30 NOTE — PN
Date of Progress Note:  01/30/2023



Chief Complaint:  End-stage renal disease, on hemodialysis.



History Of Present Illness:  Patient was transferred to ICU and she was intubated.  Today, family dec
ided to withdraw life support and ET tube was removed.  The patient has end-stage renal disease.  She
 was dialyzed yesterday and on Saturday for volume control to control congestive heart failure and tr
eat fluid overload.  She has multiple medical problems including obesity, chronic respiratory failure
, chronic obstructive pulmonary disease.  She was treated with antibiotics for severe abscesses.



Review of Systems:

Unobtainable.  Patient is extubated, cannot provide review of systems.  She is also sedated.  She carl
ears chronically ill.  Respiratory is symmetrical chest expansion.  Peripheral edema present.



Impression And Plan:  

1.End-stage renal disease.  Dialysis will be stopped.  Patient had withdrawal of life support accord
ing to family and patient's wishes.

2.Volume overload.  Patient received treatment to optimize volemia and treatment was over last 2 day
s.

3.Secondary hyperparathyroidism.  Patient was taking Renvela.

4.Acute respiratory failure.  The patient had ET tube removed because the family decided to withdraw
 life support.





EB/MODL

DD:  01/30/2023 20:41:22Voice ID:  [id number]

DT:  01/30/2023 22:34:06Report ID:  [id number]

## 2023-01-30 NOTE — P.PN
Subjective


Date of Service: 01/30/23


Primary Care Provider: Brittni


Chief Complaint: Respiratory failure unable to wean off the ventilator





Patient lying in bed on Vent support with ETT in place and under sedation. Per 

primary team notes and bedside RN, plan to withdrawal care today








Physical Examination





- Vital Signs


Temperature: 96.9 F


Blood Pressure: 108/64


Pulse: 90


Respirations: 17


Pulse Ox (%): 94





- Studies








Albuterol Sulfate (Albuterol 2.5 Mg/3 Ml Neb Sol)  2.5 mg NEB [id number] PRN


   PRN Reason: WHEEZING


Amiodarone HCl (Amiodarone Hcl 200 Mg Tab)  200 mg PO BID [facility]


   Last Admin: 01/29/23 20:56 Dose:  200 mg


   


Arformoterol Tartrate (Arformoterol Tartrate 15 Mcg/2 Ml Vial.Neb)  15 mcg NEB 

BIDRESP [facility]


   Last Admin: 01/29/23 08:08 Dose:  15 mcg


   


Epoetin [place] (Epoetin 4,000 Unit/Ml Vial)  4,000 unit IV EVERY HD [facility]


   Last Admin: 01/28/23 22:00 Dose:  4,000 unit


   


Fentanyl Citrate (Fentanyl Citr 100 Mcg/2 Ml)  25 mcg IV Q4HP PRN


   PRN Reason: Pain scale 8-10 (Severe)


   Last Admin: 01/28/23 09:53 Dose:  25 mcg


   


Folic Acid (Folic Acid 1 Mg Tablet)  1 mg PO DAILY [facility]


   Last Admin: 01/29/23 09:34 Dose:  1 mg


   


Gabapentin (Gabapentin 300 Mg Cap)  300 mg PO BEDTIME [facility]


   Last Admin: 01/29/23 20:56 Dose:  300 mg


   


Haloperidol Lactate (Haloperidol Lact 5 Mg/Ml Inj)  2 mg IV Q4HP PRN


   PRN Reason: AGITATION


   Last Admin: 01/29/23 15:29 Dose:  2 mg


   


Home Med (Brimonidine Tartrate [Alphagan P])  1 drop OPTH BID [facility]


   Last Admin: 01/29/23 20:56 Dose:  Not Given


   


Sodium Chloride (Sodium Chloride)  250 mls @ 999 mls/hr IV Q15M PRN


   PRN Reason: HYPOTENSION


   Last Admin: 01/25/23 06:20 Dose:  250 mls


   


Propofol (Diprivan)  500 mg in 50 mls @ 3.47 mls/hr IV TITR PRN; Protocol


   PRN Reason: SEDATION


   Last Titration: 01/30/23 06:09 Dose:  13 mcg/kg/min, 9 mls/hr


   


Lorazepam (Lorazepam 2 Mg/Ml Vial)  2 mg IV Q2HP PRN


   PRN Reason: Sedation-MIDAZOLAM INEFFECTIVE


   Last Admin: 01/30/23 03:55 Dose:  2 mg


   


Metoprolol Tartrate (Metoprolol Tar 25 Mg Tab)  12.5 mg PO BID [facility]


   Last Admin: 01/27/23 08:23 Dose:  Not Given


   


Midazolam HCl (Midazolam Hcl 2 Mg/2 Ml Inj)  2 mg IV Q2HP PRN


   PRN Reason: Sedation-Propofol not effect


   Last Admin: 01/29/23 15:29 Dose:  2 mg


   


Midodrine (Midodrine Hcl 5 Mg Tablet)  10 mg PO TID [facility]


   Last Admin: 01/29/23 20:55 Dose:  10 mg


   


Mupirocin (Mupirocin 2% Oint 22gm Tube)  1 appl TOP BID [facility]


   Stop: 02/01/23 09:01


   Last Admin: 01/29/23 20:57 Dose:  1 appl


   


Nutritional Formula (Vital Af 1,000 Ml Bot)  0 ml RTH CONT [facility]


Ondansetron HCl (Ondansetron 4 Mg/2 Ml Vial)  8 mg IV Q8H PRN


   PRN Reason: NAUSEA / VOMITING


   Last Admin: 01/24/23 21:23 Dose:  8 mg


   


Pantoprazole Sodium (Pantoprazole (Granules) 40 Mg/Blist Packet)  40 mg FT DAILY

[facility]


   Last Admin: 01/29/23 09:33 Dose:  40 mg


   


Sevelamer Carbonate (Sevelamer Carbonate 800 Mg Tablet)  800 mg PO TID [facility]


   Last Admin: 01/29/23 20:56 Dose:  800 mg


   


Sodium Chloride (Flush Normal Saline 10 Ml)  10 ml IV BID [facility]


   Last Admin: 01/29/23 20:56 Dose:  10 ml


   








Microbiology Data (last 24 hrs): 


                                  Microbiology





01/20/23 21:54   Blood  - Blood   Aerobic Blood Culture - Final


                            No growth in 5 days.


01/20/23 21:54   Blood  - Blood   Anaerobic Blood Culture - Final


                            No growth in 5 days.


01/20/23 21:48   Blood  - Blood   Aerobic Blood Culture - Final


                            No growth in 5 days.


01/20/23 21:48   Blood  - Blood   Anaerobic Blood Culture - Final











Assessment And Plan





- Current Problems (Diagnosis)


(1) Urinary tract infection


Current Visit: Yes   Status: Acute   


Plan: 





Per Primary team and RN at the bedside. Family plans to withdrawal care today.





ID signs off the case.











- Plan





Per Primary team and RN at the bedside. Family plans to withdrawal care today.





- ESRD (end stage renal disease) on dialysis: Nephrology onboard


- Left hydronephrosis due to renal stone


- Left renal stone: CT: 11 mm stone proximal left ureter with moderate left 

hydronephrosis. Pus seen on stenting with stent placement, 1/24


- Atrial fibrillation: Managed by Primary team


- CHF (congestive heart failure): Managed by primary team


- Non-compliance with renal dialysis


- Chronic hypotension


- Hyperlipidemia


- Chronic anticoagulation use


- History of nephrolithiasis requiring stent placement


- Recurrent UTI


- End-stage renal disease


- HTN


- tubal ligation


- cholecystectomy


- right broken wrist


- cataracts removed


- back surgery








ID will sign off the case.





Case has been discussed with Dr. Becerra N





Physician Review: Patient Assessed, Agree with Above Assessment and Plan